# Patient Record
Sex: MALE | Race: WHITE | Employment: OTHER | URBAN - METROPOLITAN AREA
[De-identification: names, ages, dates, MRNs, and addresses within clinical notes are randomized per-mention and may not be internally consistent; named-entity substitution may affect disease eponyms.]

---

## 2017-05-30 ENCOUNTER — TRANSFERRED RECORDS (OUTPATIENT)
Dept: HEALTH INFORMATION MANAGEMENT | Facility: CLINIC | Age: 52
End: 2017-05-30

## 2017-06-07 ENCOUNTER — TRANSFERRED RECORDS (OUTPATIENT)
Dept: HEALTH INFORMATION MANAGEMENT | Facility: CLINIC | Age: 52
End: 2017-06-07

## 2017-06-13 ENCOUNTER — TRANSFERRED RECORDS (OUTPATIENT)
Dept: HEALTH INFORMATION MANAGEMENT | Facility: CLINIC | Age: 52
End: 2017-06-13

## 2017-06-17 ENCOUNTER — TRANSFERRED RECORDS (OUTPATIENT)
Dept: HEALTH INFORMATION MANAGEMENT | Facility: CLINIC | Age: 52
End: 2017-06-17

## 2017-06-21 ENCOUNTER — TRANSFERRED RECORDS (OUTPATIENT)
Dept: HEALTH INFORMATION MANAGEMENT | Facility: CLINIC | Age: 52
End: 2017-06-21

## 2017-07-04 ENCOUNTER — TRANSFERRED RECORDS (OUTPATIENT)
Dept: HEALTH INFORMATION MANAGEMENT | Facility: CLINIC | Age: 52
End: 2017-07-04

## 2017-07-10 ENCOUNTER — TRANSFERRED RECORDS (OUTPATIENT)
Dept: HEALTH INFORMATION MANAGEMENT | Facility: CLINIC | Age: 52
End: 2017-07-10

## 2017-07-24 ENCOUNTER — TRANSFERRED RECORDS (OUTPATIENT)
Dept: HEALTH INFORMATION MANAGEMENT | Facility: CLINIC | Age: 52
End: 2017-07-24

## 2017-07-26 ENCOUNTER — MEDICAL CORRESPONDENCE (OUTPATIENT)
Dept: HEALTH INFORMATION MANAGEMENT | Facility: CLINIC | Age: 52
End: 2017-07-26

## 2017-07-31 ENCOUNTER — OFFICE VISIT (OUTPATIENT)
Dept: SURGERY | Facility: CLINIC | Age: 52
End: 2017-07-31

## 2017-07-31 VITALS
BODY MASS INDEX: 24.72 KG/M2 | HEART RATE: 100 BPM | OXYGEN SATURATION: 100 % | TEMPERATURE: 98.2 F | HEIGHT: 69 IN | DIASTOLIC BLOOD PRESSURE: 86 MMHG | WEIGHT: 166.9 LBS | SYSTOLIC BLOOD PRESSURE: 125 MMHG

## 2017-07-31 DIAGNOSIS — C78.6 PERITONEAL CARCINOMATOSIS (H): ICD-10-CM

## 2017-07-31 DIAGNOSIS — C78.6 PERITONEAL CARCINOMATOSIS (H): Primary | ICD-10-CM

## 2017-07-31 LAB
ABO + RH BLD: NORMAL
ABO + RH BLD: NORMAL
ALBUMIN SERPL-MCNC: 3.2 G/DL (ref 3.4–5)
ALP SERPL-CCNC: 179 U/L (ref 40–150)
ALT SERPL W P-5'-P-CCNC: 43 U/L (ref 0–70)
ANION GAP SERPL CALCULATED.3IONS-SCNC: 8 MMOL/L (ref 3–14)
AST SERPL W P-5'-P-CCNC: 21 U/L (ref 0–45)
BILIRUB SERPL-MCNC: 0.2 MG/DL (ref 0.2–1.3)
BLD GP AB SCN SERPL QL: NORMAL
BLOOD BANK CMNT PATIENT-IMP: NORMAL
BUN SERPL-MCNC: 20 MG/DL (ref 7–30)
CALCIUM SERPL-MCNC: 9.3 MG/DL (ref 8.5–10.1)
CANCER AG125 SERPL-ACNC: 50 U/ML (ref 0–30)
CEA SERPL-MCNC: 2.1 UG/L (ref 0–2.5)
CHLORIDE SERPL-SCNC: 99 MMOL/L (ref 94–109)
CO2 SERPL-SCNC: 30 MMOL/L (ref 20–32)
CREAT SERPL-MCNC: 1.1 MG/DL (ref 0.66–1.25)
ERYTHROCYTE [DISTWIDTH] IN BLOOD BY AUTOMATED COUNT: 15 % (ref 10–15)
GFR SERPL CREATININE-BSD FRML MDRD: 70 ML/MIN/1.7M2
GLUCOSE SERPL-MCNC: 119 MG/DL (ref 70–99)
HCT VFR BLD AUTO: 31.9 % (ref 40–53)
HGB BLD-MCNC: 9.7 G/DL (ref 13.3–17.7)
MCH RBC QN AUTO: 24.2 PG (ref 26.5–33)
MCHC RBC AUTO-ENTMCNC: 30.4 G/DL (ref 31.5–36.5)
MCV RBC AUTO: 80 FL (ref 78–100)
PLATELET # BLD AUTO: 408 10E9/L (ref 150–450)
POTASSIUM SERPL-SCNC: 2.9 MMOL/L (ref 3.4–5.3)
PREALB SERPL IA-MCNC: 24 MG/DL (ref 15–45)
PROT SERPL-MCNC: 7.9 G/DL (ref 6.8–8.8)
RBC # BLD AUTO: 4.01 10E12/L (ref 4.4–5.9)
SODIUM SERPL-SCNC: 137 MMOL/L (ref 133–144)
SPECIMEN EXP DATE BLD: NORMAL
WBC # BLD AUTO: 10.7 10E9/L (ref 4–11)

## 2017-07-31 RX ORDER — CHOLESTYRAMINE 4 G/9G
2 POWDER, FOR SUSPENSION ORAL
COMMUNITY
End: 2017-11-20

## 2017-07-31 RX ORDER — LOPERAMIDE HCL 2 MG
4 CAPSULE ORAL 4 TIMES DAILY PRN
COMMUNITY
End: 2018-01-01

## 2017-07-31 ASSESSMENT — PAIN SCALES - GENERAL: PAINLEVEL: NO PAIN (0)

## 2017-07-31 NOTE — PROGRESS NOTES
Colon and Rectal Surgery Clinic Note    RE: Sebas Lopez.  : 1965.  NADIYA: 2017.    Reason for visit: Sigmoid colon adenocarcinoma with peritoneal carcinomatosis.    HPI: 53 y/o M with a h/o UC diagnosed in the  (no medical management and poor endoscopic surveillance but no previous h/o colon dysplasia) who presented in 2017 with a 3-week h/o generalized abdominal pain. CT at that time showed sigmoid wall thickening with adjacent mesenteric adenopathy and free fluid near the abdominal wall mesh. Incomplete colonoscopy showed an obstructing sigmoid colon mass. Biopsies of this lesion showed adenocarcinoma. Subsequent CT colonography showed the known malignant sigmoid mass and an 8-mm transverse colon polyp. Pt subsequently developed obstructive symptoms and underwent exploratory laparotomy by Dr. Bartholomew on 17. Diffuse peritoneal carcinomatosis was found and biopsies confirmed mucinous adenocarcinoma. Multiple large peritoneal metastases were involving the abdominal wall mesh and loops of small bowel. Given that the obstruction was at the small bowel, 2 feet of ileum was resected and an end ileostomy was fashioned. No PCI was calculated. His postoperative course was largely unremarkable. Currently he is having minimal pain and is off narcotic pain meds. Tolerating diet well. Denies issues with pouching his ileostomy but does have high output and is on Imodium and Questran for this. PMH significant for UC, HTN, and abdominal incisional hernia repair with mesh. No tobacco or EtOH abuse. Denies FH of CRC or IBD.     Medical history:  No past medical history on file.    Surgical history:  No past surgical history on file.    Family history:  No family history on file.    Medications:  Current Outpatient Prescriptions   Medication Sig Dispense Refill     cholestyramine (QUESTRAN) 4 G Packet Take 2 packets by mouth 3 times daily (with meals)       loperamide (IMODIUM) 2 MG capsule Take 2  "mg by mouth 4 times daily as needed for diarrhea       Ferrous Sulfate (IRON SUPPLEMENT PO)          Allergies:  No Known Allergies    Social history:   Social History   Substance Use Topics     Smoking status: Never Smoker     Smokeless tobacco: Never Used     Alcohol use 3.0 oz/week     5 Cans of beer per week     Marital status: single.    Physical Examination:  /86 (BP Location: Left arm, Patient Position: Chair, Cuff Size: Adult Regular)  Pulse 100  Temp 98.2  F (36.8  C) (Oral)  Ht 5' 9\"  Wt 166 lb 14.4 oz  SpO2 100%  BMI 24.65 kg/m2  General: Well hydrated. No acute distress.  HEENT: Normocephalic, EOM's intact. Non icteric conjunctiva. EAC's with no abnormalities. Oral mucosa well hydrated, with no exudates visualized. No cervical adenopathy palpated.  Chest: RRR. S1 and S2 normal. No murmurs. Good breath sounds bilaterally with no rhonchi or wheezing.   Abdomen: Soft, NT. No masses or nodules palpated. Midline incision healing well with no evidence of infection. Retention sutures in place. Right-sided ileostomy viable and functioning. No inguinal adenopathy palpated.  Extremities: Normotrophic. Distal pulses intact. ROM intact.    Investigations:  None.    Procedures:  None.    ASSESSMENT  51 y/o M with MARILEE and new diagnosis of biopsy-proven sigmoid adenocarcinoma and peritoneal carcinomatosis. We discussed the role and impact of cytoreductive surgery with hyperthermic intraperitoneal chemotherapy for metastatic colorectal cancer, including the need for short-term preoperative chemotherapy, intraoperative findings that may limit or preclude the extent of resection and delivery of intraperitoneal chemotherapy, postoperative complications, need for postoperative chemotherapy, likelihood of recurrent cancer, and quality of life. He understands these well and agrees to proceed.    No evidence of extra-abdominal disease or liver metastases but the intraoperative finding of diffuse small bowel " movement is typically preclusive for HIPEC. Risks, benefits, and alternatives of operative treatment were thoroughly discussed with the patient, he/she understands these well and agrees to proceed.    PLAN  1. CT c/a/p and Labs today.   2. CRMP referral for genetic testing.  3. Refer to Medical Oncology to start chemotherapy. Would recommend 4-6 cycles with interval re-imaging, and then return to clinic with me to discuss diagnostic laparoscopy and possible cytoreductive surgery with hyperthermic intraperitoneal chemotherapy. Will likely need full colonoscopy with random biopsies at that time if we are to proceed with surgery.    Time spent: 60 minutes.  >50% spent in discussing, counseling and coordinating care.    Rob Dudley M.D., M.Sc.     Division of Colon and Rectal Surgery  [facility]    Referring Provider:  Donovan Bartholomew MD  [address] [address]     Primary Care Provider:  Waylon Han    CC:  Bhargav Lynch MD.

## 2017-07-31 NOTE — MR AVS SNAPSHOT
After Visit Summary   7/31/2017    Sebas Lopez    MRN: [id number]           Patient Information     Date Of Birth          1965        Visit Information        Provider Department      7/31/2017 3:00 PM Rob Dudley MD [facility] Colon and Rectal Surgery        Today's Diagnoses     Peritoneal carcinomatosis (H)    -  1       Follow-ups after your visit        Additional Services     CANCER RISK MGMT/CANCER GENETIC COUNSELING REFERRAL       Your provider has referred you to the Cancer Risk Management Program - Cancer Genetic Counseling.    Reason for Referral: metastatic colon cancer (young age).    We have a sent a notice to a staff member of the Cancer Risk Management Program to give you a call to assist with scheduling your appointment.  You may also call  [phone] ([phone]) to initiate scheduling.    Please be aware that coverage of these services is subject to the terms and limitations of your health insurance plan.  Call member services at your health plan with any benefit or coverage questions.      Please bring the completed family history sheet to your appointment in addition to any available outside medical records documenting your cancer diagnosis.                  Your next 10 appointments already scheduled     Jul 31, 2017  5:00 PM CDT   (Arrive by 4:45 PM)   CT CHEST ABDOMEN PELVIS W/O & W CONTRAST with UCCT2   [facility] ([facility])    [address] [address] [phone]           Please bring any scans or X-rays taken at other hospitals, if similar tests were done. Also bring a list of your medicines, including vitamins, minerals and over-the-counter drugs. It is safest to leave personal items at home.  Be sure to tell your doctor:   If you have any allergies.   If there s any chance you are pregnant.   If you are breastfeeding.   If you have any special needs.  You  may have contrast for this exam. To prepare:   Do not eat or drink for 2 hours before your exam. If you need to take medicine, you may take it with small sips of water. (We may ask you to take liquid medicine as well.)   The day before your exam, drink extra fluids at least six 8-ounce glasses (unless your doctor tells you to restrict your fluids).  Patients over 70 or patients with diabetes or kidney problems:   If you haven t had a blood test (creatinine test) within the last 30 days, go to your clinic or Diagnostic Imaging Department for this test.  If you have diabetes:   If your kidney function is normal, continue taking your metformin (Avandamet, Glucophage, Glucovance, Metaglip) on the day of your exam.   If your kidney function is abnormal, wait 48 hours before restarting this medicine.  You will have oral contrast for this exam:   You will drink the contrast at home. Get this from your clinic or Diagnostic Imaging Department. Please follow the directions given.  Please wear loose clothing, such as a sweat suit or jogging clothes. Avoid snaps, zippers and other metal. We may ask you to undress and put on a hospital gown.  If you have any questions, please call the Imaging Department where you will have your exam.              Future tests that were ordered for you today     Open Future Orders        Priority Expected Expires Ordered    ABO/Rh type and screen Routine  7/31/2018 7/31/2017    Prealbumin Routine  10/29/2017 7/31/2017    CT Chest abdomen pelvis w & w/o contrast Routine  7/31/2018 7/31/2017    CBC with platelets Routine  10/29/2017 7/31/2017    Comprehensive metabolic panel Routine  10/29/2017 7/31/2017    CEA Routine  10/29/2017 7/31/2017     Routine  8/1/2018 7/31/2017    Cancer antigen 19-9 Routine  8/1/2018 7/31/2017            Who to contact     Please call your clinic at [phone] to:    Ask questions about your health    Make or cancel appointments    Discuss your  "medicines    Learn about your test results    Speak to your doctor   If you have compliments or concerns about an experience at your clinic, or if you wish to file a complaint, please contact [facility] Patient Relations at [phone] or email us at [email or web address]         Additional Information About Your Visit        [employer] Information     [employer] is an electronic gateway that provides easy, online access to your medical records. With hike, you can request a clinic appointment, read your test results, renew a prescription or communicate with your care team.     To sign up for hike visit the website at [email or web address]   You will be asked to enter the access code listed below, as well as some personal information. Please follow the directions to create your username and password.     Your access code is: [email or web address]  Expires: 10/25/2017  3:06 PM     Your access code will  in 90 days. If you need help or a new code, please contact your [facility] or call [phone] for assistance.        Care EveryWhere ID     This is your Care EveryWhere ID. This could be used by other organizations to access your [facility] medical records  [id number]        Your Vitals Were     Pulse Temperature Height Pulse Oximetry BMI (Body Mass Index)       100 98.2  F (36.8  C) (Oral) 5' 9\" 100% 24.65 kg/m2        Blood Pressure from Last 3 Encounters:   17 125/86    Weight from Last 3 Encounters:   17 166 lb 14.4 oz              We Performed the Following     CANCER RISK MGMT/CANCER GENETIC COUNSELING REFERRAL        Primary Care Provider Office Phone # Fax #    Waylon Han [phone]       [facility] [address] [facility] [address]        Equal Access to Services     SARAH DUNN AH: Cristina Funk, mario abernathy, bhargav carrilloaabrice " ah. So [facility] [phone].    ATENCIÓN: Si habla blanca, tiene a cid disposición servicios gratuitos de asistencia lingüística. Kenan al [phone].    We comply with applicable federal civil rights laws and [place] laws. We do not discriminate on the basis of race, color, national origin, age, disability sex, sexual orientation or gender identity.            Thank you!     Thank you for choosing [facility] COLON AND RECTAL SURGERY  for your care. Our goal is always to provide you with excellent care. Hearing back from our patients is one way we can continue to improve our services. Please take a few minutes to complete the written survey that you may receive in the mail after your visit with us. Thank you!             Your Updated Medication List - Protect others around you: Learn how to safely use, store and throw away your medicines at www.disposemymeds.org.          This list is accurate as of: 7/31/17  3:53 PM.  Always use your most recent med list.                   Brand Name Dispense Instructions for use Diagnosis    cholestyramine 4 G Packet    QUESTRAN     Take 2 packets by mouth 3 times daily (with meals)        IRON SUPPLEMENT PO           loperamide 2 MG capsule    IMODIUM     Take 2 mg by mouth 4 times daily as needed for diarrhea

## 2017-07-31 NOTE — LETTER
2017       RE: Sebas Lopez  [address] [place] [address]     Dear Colleague,    Thank you for referring your patient, Sebas Lopez, to the [facility] COLON AND RECTAL SURGERY at [facility]. Please see a copy of my visit note below.    Colon and Rectal Surgery Clinic Note    RE: Sebas Lopez.  : 1965.  NADIYA: 2017.    Reason for visit: Sigmoid colon adenocarcinoma with peritoneal carcinomatosis.    HPI: 53 y/o M with a h/o UC diagnosed in the  (no medical management and poor endoscopic surveillance but no previous h/o colon dysplasia) who presented in 2017 with a 3-week h/o generalized abdominal pain. CT at that time showed sigmoid wall thickening with adjacent mesenteric adenopathy and free fluid near the abdominal wall mesh. Incomplete colonoscopy showed an obstructing sigmoid colon mass. Biopsies of this lesion showed adenocarcinoma. Subsequent CT colonography showed the known malignant sigmoid mass and an 8-mm transverse colon polyp. Pt subsequently developed obstructive symptoms and underwent exploratory laparotomy by Dr. Bartholomew on 17. Diffuse peritoneal carcinomatosis was found and biopsies confirmed mucinous adenocarcinoma. Multiple large peritoneal metastases were involving the abdominal wall mesh and loops of small bowel. Given that the obstruction was at the small bowel, 2 feet of ileum was resected and an end ileostomy was fashioned. No PCI was calculated. His postoperative course was largely unremarkable. Currently he is having minimal pain and is off narcotic pain meds. Tolerating diet well. Denies issues with pouching his ileostomy but does have high output and is on Imodium and Questran for this. PMH significant for UC, HTN, and abdominal incisional hernia repair with mesh. No tobacco or EtOH abuse. Denies FH of CRC or IBD.     Medical history:  No past medical history on file.    Surgical history:  No past  "surgical history on file.    Family history:  No family history on file.    Medications:  Current Outpatient Prescriptions   Medication Sig Dispense Refill     cholestyramine (QUESTRAN) 4 G Packet Take 2 packets by mouth 3 times daily (with meals)       loperamide (IMODIUM) 2 MG capsule Take 2 mg by mouth 4 times daily as needed for diarrhea       Ferrous Sulfate (IRON SUPPLEMENT PO)          Allergies:  No Known Allergies    Social history:   Social History   Substance Use Topics     Smoking status: Never Smoker     Smokeless tobacco: Never Used     Alcohol use 3.0 oz/week     5 Cans of beer per week     Marital status: single.    Physical Examination:  /86 (BP Location: Left arm, Patient Position: Chair, Cuff Size: Adult Regular)  Pulse 100  Temp 98.2  F (36.8  C) (Oral)  Ht 5' 9\"  Wt 166 lb 14.4 oz  SpO2 100%  BMI 24.65 kg/m2  General: Well hydrated. No acute distress.  HEENT: Normocephalic, EOM's intact. Non icteric conjunctiva. EAC's with no abnormalities. Oral mucosa well hydrated, with no exudates visualized. No cervical adenopathy palpated.  Chest: RRR. S1 and S2 normal. No murmurs. Good breath sounds bilaterally with no rhonchi or wheezing.   Abdomen: Soft, NT. No masses or nodules palpated. Midline incision healing well with no evidence of infection. Retention sutures in place. Right-sided ileostomy viable and functioning. No inguinal adenopathy palpated.  Extremities: Normotrophic. Distal pulses intact. ROM intact.    Investigations:  None.    Procedures:  None.    ASSESSMENT  53 y/o M with MARILEE and new diagnosis of biopsy-proven sigmoid adenocarcinoma and peritoneal carcinomatosis. We discussed the role and impact of cytoreductive surgery with hyperthermic intraperitoneal chemotherapy for metastatic colorectal cancer, including the need for short-term preoperative chemotherapy, intraoperative findings that may limit or preclude the extent of resection and delivery of intraperitoneal " chemotherapy, postoperative complications, need for postoperative chemotherapy, likelihood of recurrent cancer, and quality of life. He understands these well and agrees to proceed.    No evidence of extra-abdominal disease or liver metastases but the intraoperative finding of diffuse small bowel movement is typically preclusive for HIPEC. Risks, benefits, and alternatives of operative treatment were thoroughly discussed with the patient, he/she understands these well and agrees to proceed.    PLAN  1. CT c/a/p and Labs today.   2. CRMP referral for genetic testing.  3. Refer to Medical Oncology to start chemotherapy. Would recommend 4-6 cycles with interval re-imaging, and then return to clinic with me to discuss diagnostic laparoscopy and possible cytoreductive surgery with hyperthermic intraperitoneal chemotherapy. Will likely need full colonoscopy with random biopsies at that time if we are to proceed with surgery.    Time spent: 60 minutes.  >50% spent in discussing, counseling and coordinating care.    Referring Provider:  Donovan Bartholomew MD  [address] [address]     Primary Care Provider:  Waylon Han    CC:  Bhargav Lynch MD.    Again, thank you for allowing me to participate in the care of your patient.    Rob Dudley MD

## 2017-07-31 NOTE — NURSING NOTE
"Chief Complaint   Patient presents with     Clinic Care Coordination - Initial     New pt consult for sigmoid colon cancer.        Vitals:    07/31/17 1440   BP: 125/86   BP Location: Left arm   Patient Position: Chair   Cuff Size: Adult Regular   Pulse: 100   Temp: 98.2  F (36.8  C)   TempSrc: Oral   SpO2: 100%   Weight: 166 lb 14.4 oz   Height: 5' 9\"       Body mass index is 24.65 kg/(m^2).    Jef PINO LPN                       "

## 2017-08-02 LAB — CANCER AG19-9 SERPL-ACNC: 2

## 2017-08-03 ENCOUNTER — ONCOLOGY VISIT (OUTPATIENT)
Dept: ONCOLOGY | Facility: CLINIC | Age: 52
End: 2017-08-03
Attending: INTERNAL MEDICINE
Payer: COMMERCIAL

## 2017-08-03 VITALS
BODY MASS INDEX: 25.7 KG/M2 | WEIGHT: 173.5 LBS | OXYGEN SATURATION: 100 % | HEART RATE: 98 BPM | HEIGHT: 69 IN | SYSTOLIC BLOOD PRESSURE: 134 MMHG | DIASTOLIC BLOOD PRESSURE: 89 MMHG | TEMPERATURE: 98.2 F

## 2017-08-03 DIAGNOSIS — C78.6 PERITONEAL CARCINOMATOSIS (H): ICD-10-CM

## 2017-08-03 DIAGNOSIS — C18.7 CANCER OF SIGMOID COLON (H): Primary | ICD-10-CM

## 2017-08-03 DIAGNOSIS — D64.9 ANEMIA, UNSPECIFIED TYPE: ICD-10-CM

## 2017-08-03 DIAGNOSIS — L03.311 CELLULITIS OF ABDOMINAL WALL: ICD-10-CM

## 2017-08-03 LAB
BASOPHILS # BLD AUTO: 0.1 10E9/L (ref 0–0.2)
BASOPHILS NFR BLD AUTO: 0.6 %
DIFFERENTIAL METHOD BLD: ABNORMAL
EOSINOPHIL # BLD AUTO: 1.5 10E9/L (ref 0–0.7)
EOSINOPHIL NFR BLD AUTO: 11.1 %
ERYTHROCYTE [DISTWIDTH] IN BLOOD BY AUTOMATED COUNT: 15.1 % (ref 10–15)
FERRITIN SERPL-MCNC: 36 NG/ML (ref 26–388)
FOLATE SERPL-MCNC: 21.5 NG/ML
HCT VFR BLD AUTO: 31 % (ref 40–53)
HGB BLD-MCNC: 9.5 G/DL (ref 13.3–17.7)
IMM GRANULOCYTES # BLD: 0 10E9/L (ref 0–0.4)
IMM GRANULOCYTES NFR BLD: 0.3 %
IRON SATN MFR SERPL: 5 % (ref 15–46)
IRON SERPL-MCNC: 16 UG/DL (ref 35–180)
LYMPHOCYTES # BLD AUTO: 2.5 10E9/L (ref 0.8–5.3)
LYMPHOCYTES NFR BLD AUTO: 19.1 %
MCH RBC QN AUTO: 24.4 PG (ref 26.5–33)
MCHC RBC AUTO-ENTMCNC: 30.6 G/DL (ref 31.5–36.5)
MCV RBC AUTO: 80 FL (ref 78–100)
MONOCYTES # BLD AUTO: 0.9 10E9/L (ref 0–1.3)
MONOCYTES NFR BLD AUTO: 7 %
NEUTROPHILS # BLD AUTO: 8.2 10E9/L (ref 1.6–8.3)
NEUTROPHILS NFR BLD AUTO: 61.9 %
NRBC # BLD AUTO: 0 10*3/UL
NRBC BLD AUTO-RTO: 0 /100
PLATELET # BLD AUTO: 490 10E9/L (ref 150–450)
RBC # BLD AUTO: 3.89 10E12/L (ref 4.4–5.9)
RETICS # AUTO: 25.7 10E9/L (ref 25–95)
RETICS/RBC NFR AUTO: 0.7 % (ref 0.5–2)
TIBC SERPL-MCNC: 329 UG/DL (ref 240–430)
VIT B12 SERPL-MCNC: 606 PG/ML (ref 193–986)
WBC # BLD AUTO: 13.2 10E9/L (ref 4–11)

## 2017-08-03 PROCEDURE — 83540 ASSAY OF IRON: CPT | Performed by: INTERNAL MEDICINE

## 2017-08-03 PROCEDURE — 99213 OFFICE O/P EST LOW 20 MIN: CPT

## 2017-08-03 PROCEDURE — 85025 COMPLETE CBC W/AUTO DIFF WBC: CPT | Performed by: INTERNAL MEDICINE

## 2017-08-03 PROCEDURE — 85045 AUTOMATED RETICULOCYTE COUNT: CPT | Performed by: INTERNAL MEDICINE

## 2017-08-03 PROCEDURE — 36415 COLL VENOUS BLD VENIPUNCTURE: CPT

## 2017-08-03 PROCEDURE — 83550 IRON BINDING TEST: CPT | Performed by: INTERNAL MEDICINE

## 2017-08-03 PROCEDURE — 99205 OFFICE O/P NEW HI 60 MIN: CPT | Mod: ZP | Performed by: INTERNAL MEDICINE

## 2017-08-03 PROCEDURE — 82746 ASSAY OF FOLIC ACID SERUM: CPT | Performed by: INTERNAL MEDICINE

## 2017-08-03 PROCEDURE — 99212 OFFICE O/P EST SF 10 MIN: CPT | Mod: ZF

## 2017-08-03 PROCEDURE — 40000611 ZZHCL STATISTIC MORPHOLOGY W/INTERP HEMEPATH TC 85060: Performed by: INTERNAL MEDICINE

## 2017-08-03 PROCEDURE — 82607 VITAMIN B-12: CPT | Performed by: INTERNAL MEDICINE

## 2017-08-03 PROCEDURE — 82728 ASSAY OF FERRITIN: CPT | Performed by: INTERNAL MEDICINE

## 2017-08-03 ASSESSMENT — PAIN SCALES - GENERAL: PAINLEVEL: NO PAIN (0)

## 2017-08-03 NOTE — PATIENT INSTRUCTIONS
Start Augmentin for wound cellulitis    Talk to surgeon regarding pulling the suture    Schedule port placement    Start FOLFOX asap    Labs today    See a provider on the day you start chemo

## 2017-08-03 NOTE — PROGRESS NOTES
Oncology initial visit:  Date on this visit: 8/3/2017    Sebas Lopez  is referred by Dr.Wolfgang Dharmesh Dudley for an oncology consultation. He requires evaluation for new diagnosis of sigmoid adenocarcinoma with peritoneal carcinomatosis    Primary Physician: Waylon Han     History Of Present Illness:     Sebas is a 52-year-old male who has a history of ulcerative colitis diagnosed more than 20 years ago, but he has not had medical management for that.  He was doing well, but in 05/2017 he presented with a few weeks of abdominal pain.  At that time, his imaging showed that he had a sigmoid wall thickening with adjacent mesenteric lymphadenopathy and free fluid near the abdominal wall mesh from his prior hernia surgery.  He subsequently underwent a colonoscopy which was incomplete and showed an obstructing sigmoid colon mass.  The biopsy from the sigmoid mass showed sigmoid adenocarcinoma.  He then developed obstructive symptoms and underwent exploratory laparotomy on 07/10/2017.  During that he was found to have diffuse peritoneal carcinomatosis.  Extensive lysis of adhesions was performed and a small bowel resection was performed with end ileostomy.  The biopsies from the multiple large peritoneal metastasis also were positive for metastatic adenocarcinoma.      He told me that after that he lost about 20 pounds but has recovered well from the surgery.  He currently denies any nausea or vomiting.  He thinks his ostomy output is significant, but he takes Imodium as well as cholestyramine to control it.  He is able to eat and drink well and maintain his hydration.  He denies any blood.  He denies any pain, but over the last couple of days one of the sutures started to bother him and he noticed a little bit of pus coming out from it.  He tells me that he was supposed to get the sutures taken out next week, but he wants to get them out sooner than that.  Otherwise, he denies any infections or new  "swellings or any other pain.  He denies any neuropathy.  He denies any neurological problems.  He has no issues with hearing.     ROS:  A comprehensive ROS was otherwise neg      Past Medical/Surgical History:  No past medical history on file.  No past surgical history on file.   Ulcerative colitis.  Left hip replacement.       Cancer History:   As above    Allergies:  Allergies as of 08/03/2017     (No Known Allergies)     Current Medications:  Current Outpatient Prescriptions   Medication Sig Dispense Refill     amoxicillin-clavulanate (AUGMENTIN) 875-125 MG per tablet Take 1 tablet by mouth 2 times daily for 7 days 14 tablet 0     cholestyramine (QUESTRAN) 4 G Packet Take 2 packets by mouth 3 times daily (with meals)       loperamide (IMODIUM) 2 MG capsule Take 2 mg by mouth 4 times daily as needed for diarrhea       Ferrous Sulfate (IRON SUPPLEMENT PO)         Family History:  No family history on file.   No family history of cancer.  He does not have any kids.       Social History:  Social History     Social History     Marital status: Single     Spouse name: N/A     Number of children: N/A     Years of education: N/A     Occupational History     Not on file.     Social History Main Topics     Smoking status: Never Smoker     Smokeless tobacco: Never Used     Alcohol use 3.0 oz/week     5 Cans of beer per week     Drug use: No     Sexual activity: Not on file     Other Topics Concern     Not on file     Social History Narrative   He does not smoke and does not drink.  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  He lives with his girlfriend, Bessie.       Physical Exam:  /89 (BP Location: Right arm, Patient Position: Sitting, Cuff Size: Adult Regular)  Pulse 98  Temp 98.2  F (36.8  C) (Oral)  Ht 1.753 m (5' 9\")  Wt 78.7 kg (173 lb 8 oz)  SpO2 100%  BMI 25.62 kg/m2  CONSTITUTIONAL: no acute distress  EYES: PERRLA, no palor or icterus.   ENT/MOUTH: no mouth lesions. Oropharynx normal  CVS: " s1s2 no m r g .   RESPIRATORY: clear to auscultation b/l  GI: surgical scars are healing well. Ostomy site is clean. There is 1 suture on the right side of the ostomy which is slightly tender and red and with squeezing, a little bit of yellowish pussy material is coming out.  I believe it is infected.        NEURO: AAOX3  Grossly non focal neuro exam  INTEGUMENT: no obvious rashes  LYMPHATIC: no palpable cervical, supraclavicular, axillary or inguinal LAD  MUSCULOSKELETAL: Unremarkable. No bony tenderness.   EXTREMITIES: no edema  PSYCH: Mentation, mood and affect are normal. Decision making capacity is intact      Laboratory/Imaging Studies  Results for orders placed or performed in visit on 07/31/17   CT Chest/Abdomen/Pelvis w Contrast    Narrative    EXAMINATION: CT CHEST/ABDOMEN/PELVIS W CONTRAST, 7/31/2017 5:33 PM    TECHNIQUE:  Helical CT images from the thoracic inlet through the  symphysis pubis were obtained  with contrast. Contrast dose: Isovue  370 93cc    COMPARISON: CT abdomen and pelvis 7/2/2017 and CT colonography  6/21/2017.    HISTORY: Secondary malignant neoplasm of retroperitoneum and  peritoneum, Malignant (primary) neoplasm, unspecified    FINDINGS:    Chest: Heart size within normal limits. No pericardial effusion.  Conventional three-vessel branching pattern of the aortic arch.  Ascending aorta and pulmonary artery are normal caliber. Subcentimeter  lower paratracheal lymph nodes and subcarinal lymph node, which are  not enlarged by CT size criteria. Bilateral 1.5 cm axillary lymph  nodes with preserved fatty alek appear benign. Thyroid is homogeneous.    Central airways are patent. No pulmonary nodules or masses. Platelike  opacity in the left lung base, likely atelectasis or scar. No pleural  effusion or pneumothorax.    Abdomen and pelvis: The liver is unremarkable. The gallbladder is  nondistended, without wall thickening or pericholecystic fluid. No  evidence of calcified gallstones. The  spleen, pancreas, and adrenal  glands are unremarkable. There is an exophytic lesion from the  superior pole of the right kidney, which measures 1.1 cm and has a  hyperattenuating and hypoattenuating component. There is mild  pelviectasis of the right kidney. Mild pelviectasis of the left  kidney. No evidence of hydroureter. The bladder is partially  visualized and unremarkable. No dilated loops of bowel. Postsurgical  changes of loop ileostomy and decompressed distal colon. There is  again visualized a mass in the sigmoid colon which is hypointense  since. Some surrounding adenopathy in the adjacent sigmoid mesial  colon measures up to 1 cm in short axis.    The abdominal aorta is normal caliber and the origins of the celiac  trunk, SMA, and BHAVYA are patent at their origins. Multiple  subcentimeter inguinal lymph nodes, which are not enlarged by CT size  criteria. No retroperitoneal enlarged lymph nodes by CT size criteria.  There is nodularity of the anterior abdominal wall internal to the  rectus abdominis. Multiple mesenteric round soft tissue nodules, for  example a 0.6 cm nodule in the right lower quadrant mesentery (series  2 image 81), a 0.7 cm soft tissue nodule medial to the right kidney  within the mesentery (series 2 image 75), a 1.6 cm x 0.6 cm ovoid  nodule in the anterior midline mesentery (series 2 image 73), and a  0.7 cm round soft tissue nodule in the left upper quadrant mesentery  (series 2 image 72). Several lesions nodules about to the anterior  abdominal wall hernia repair mesh.     Bones and soft tissues: Mild degenerative changes of the lumbar  spine. Postsurgical changes of left total hip arthroplasty. No  suspicious bony lesions. Lucent lesion in the left acetabulum, is  likely a geode.      Impression    IMPRESSION:   1. Revisualization of sigmoid colon mass with adjacent mesenteric  adenopathy.  2. Multiple mesenteric and abdominal wall nodules, consistent with the  patient's known  peritoneal carcinomatosis. This is not changed  substantially since 7/2/2017  3. Right kidney superior pole exophytic mixed attenuation lesion,  which is nonspecific but may represent a renal cyst with hemorrhage or  proteinaceous material. Recommend attention on follow-up. Ultrasound  could also be considered.  4. No evidence of metastatic disease within the liver or chest.   5. Postsurgical changes of loop ileostomy. Decompressed distal colon,  and no evidence of obstruction.    I have personally reviewed the examination and initial interpretation  and I agree with the findings.    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  The sigmoid carcinoma is obstructing, requiring exploratory laparotomy and end ileostomy along with small bowel resection.  We discussed this whole situation in detail and I recommend that we start him on palliative chemotherapy.  We discussed the need for port placement.  We also discussed the rationale, schedule and potential side effects of FOLFOX.  He gave me verbal consent after understanding what the chemotherapy is.  He was also given a handout on the chemotherapy.        We would also like his pathology specimen to be reviewed by our pathologist and I would like to check the MSI status as well as NGS panel on the pathology specimen.       If we get good results with chemotherapy, then potentially he can go to debulking surgery with HIPEC as mentioned in Dr. Dudley's note.      Currently he has started to develop an infection in one of the suture sites on the right side of his abdomen and I will give him a course of Augmentin.  He will talk to his surgeon to get the sutures removed as soon as possible.        He does have anemia and I would like to do an anemia workup.  We will check a peripheral blood smear, iron studies, vitamin B12 and folate.  If need be, we will give him IV iron.  I  discussed with him what IV iron dextran is.  We also discussed the rationale and the potential side effects of it.  He gave me consent to give him IV iron dextran if need be.  We will decide about that once I have seen the anemia workup which I am ordering for him.     We also discussed the importance of maintaining good and healthy nutrition.  We also discussed the importance of keeping active and exercising regularly.        All of his questions were answered to his satisfaction and he is agreeable and comfortable with this plan.            Albert Long       Addendum  He has evidence of iron deficiency anemia. His peripheral blood smear is pending. I will schedule him to receive iron dextran hopefully sometime early next week    Albert Long

## 2017-08-03 NOTE — MR AVS SNAPSHOT
After Visit Summary   8/3/2017    Sebas Lopez    MRN: [id number]           Patient Information     Date Of Birth          1965        Visit Information        Provider Department      8/3/2017 3:45 PM Albert oLng MD [facility]        Today's Diagnoses     Cancer of sigmoid colon (H)    -  1    Peritoneal carcinomatosis (H)        Anemia, unspecified type        Cellulitis of abdominal wall          Care Instructions    Start Augmentin for wound cellulitis    Talk to surgeon regarding pulling the suture    Schedule port placement    Start FOLFOX asap    Labs today    See a provider on the day you start chemo          Follow-ups after your visit        Your next 10 appointments already scheduled     Aug 11, 2017  7:15 AM CDT   [facility] with  [facility]   [facility] ([facility])    [address] [place] [id number] [phone]            Aug 11, 2017  7:50 AM CDT   (Arrive by 7:35 AM)   Return Visit with Chiquis Negro PA-C   [facility] ([facility])    [address] [place] [id number] [phone]            Aug 11, 2017  8:00 AM CDT   Infusion 240 with [facility] INFUSION, UC 15 ATC   [facility] ([facility])    [address] [place] [id number] [phone]              Future tests that were ordered for you today     Open Future Orders        Priority Expected Expires Ordered    IR Chest Port Placement > 5 Yrs of Age Routine  8/3/2018 8/3/2017            Who to contact     If you have questions or need follow up information about today's clinic visit or your schedule please contact [facility] directly at [phone].  Normal or non-critical lab and imaging results will be communicated to you by MyChart, letter or  "phone within 4 business days after the clinic has received the results. If you do not hear from us within 7 days, please contact the clinic through medineering or phone. If you have a critical or abnormal lab result, we will notify you by phone as soon as possible.  Submit refill requests through medineering or call your pharmacy and they will forward the refill request to us. Please allow 3 business days for your refill to be completed.          Additional Information About Your Visit        medineering Information     medineering lets you send messages to your doctor, view your test results, renew your prescriptions, schedule appointments and more. To sign up, go to [email or web address] . Click on \"Log in\" on the left side of the screen, which will take you to the Welcome page. Then click on \"Sign up Now\" on the right side of the page.     You will be asked to enter the access code listed below, as well as some personal information. Please follow the directions to create your username and password.     Your access code is: [email or web address]  Expires: 10/25/2017  3:06 PM     Your access code will  in 90 days. If you need help or a new code, please call your [place] clinic or [phone].        Care EveryWhere ID     This is your Care EveryWhere ID. This could be used by other organizations to access your [place] medical records  [id number]        Your Vitals Were     Pulse Temperature Height Pulse Oximetry BMI (Body Mass Index)       98 98.2  F (36.8  C) (Oral) 1.753 m (5' 9\") 100% 25.62 kg/m2        Blood Pressure from Last 3 Encounters:   17 134/89   17 125/86    Weight from Last 3 Encounters:   17 78.7 kg (173 lb 8 oz)   17 75.7 kg (166 lb 14.4 oz)                 Today's Medication Changes          These changes are accurate as of: 8/3/17  4:34 PM.  If you have any questions, ask your nurse or doctor.               Start taking these medicines.        Dose/Directions    amoxicillin-clavulanate " 875-125 MG per tablet   Commonly known as:  AUGMENTIN   Used for:  Cellulitis of abdominal wall   Started by:  Albert Long MD        Dose:  1 tablet   Take 1 tablet by mouth 2 times daily for 7 days   Quantity:  14 tablet   Refills:  0            Where to get your medicines      These medications were sent to [facility] - [address] [address]     Phone:  [phone]     amoxicillin-clavulanate 875-125 MG per tablet                Primary Care Provider Office Phone # Fax #    Waylon PICHARDO Emely [phone]       [facility] [address]  [facility] [address]        Equal Access to Services     SARAH DUNN : Hadii aad ku hadasho Soomaali, waaxda luqadaha, qaybta kaalmada adeegyada, bhargav willis. So [facility] [phone].    ATENCIÓN: Si habla español, tiene a cid disposición servicios gratuitos de asistencia lingüística. Llame al [phone].    We comply with applicable federal civil rights laws and [place] laws. We do not discriminate on the basis of race, color, national origin, age, disability sex, sexual orientation or gender identity.            Thank you!     Thank you for choosing [facility]  for your care. Our goal is always to provide you with excellent care. Hearing back from our patients is one way we can continue to improve our services. Please take a few minutes to complete the written survey that you may receive in the mail after your visit with us. Thank you!             Your Updated Medication List - Protect others around you: Learn how to safely use, store and throw away your medicines at www.disposemymeds.org.          This list is accurate as of: 8/3/17  4:34 PM.  Always use your most recent med list.                   Brand Name Dispense Instructions for use Diagnosis    amoxicillin-clavulanate 875-125 MG per tablet    AUGMENTIN    14  tablet    Take 1 tablet by mouth 2 times daily for 7 days    Cellulitis of abdominal wall       cholestyramine 4 G Packet    QUESTRAN     Take 2 packets by mouth 3 times daily (with meals)        IRON SUPPLEMENT PO           loperamide 2 MG capsule    IMODIUM     Take 2 mg by mouth 4 times daily as needed for diarrhea

## 2017-08-03 NOTE — LETTER
8/3/2017       RE: Sebas Lopez  [address] [place] [place] [address]     Dear Colleague,    Thank you for referring your patient, Sebas Lopez, to the [facility]. Please see a copy of my visit note below.    Oncology initial visit:  Date on this visit: 8/3/2017    Sebas Lopez  is referred by Dr.Wolfgang Dharmesh Dudley for an oncology consultation. He requires evaluation for new diagnosis of sigmoid adenocarcinoma with peritoneal carcinomatosis    Primary Physician: Waylon Han     History Of Present Illness:     Sebas is a 52-year-old male who has a history of ulcerative colitis diagnosed more than 20 years ago, but he has not had medical management for that.  He was doing well, but in 05/2017 he presented with a few weeks of abdominal pain.  At that time, his imaging showed that he had a sigmoid wall thickening with adjacent mesenteric lymphadenopathy and free fluid near the abdominal wall mesh from his prior hernia surgery.  He subsequently underwent a colonoscopy which was incomplete and showed an obstructing sigmoid colon mass.  The biopsy from the sigmoid mass showed sigmoid adenocarcinoma.  He then developed obstructive symptoms and underwent exploratory laparotomy on 07/10/2017.  During that he was found to have diffuse peritoneal carcinomatosis.  Extensive lysis of adhesions was performed and a small bowel resection was performed with end ileostomy.  The biopsies from the multiple large peritoneal metastasis also were positive for metastatic adenocarcinoma.      He told me that after that he lost about 20 pounds but has recovered well from the surgery.  He currently denies any nausea or vomiting.  He thinks his ostomy output is significant, but he takes Imodium as well as cholestyramine to control it.  He is able to eat and drink well and maintain his hydration.  He denies any blood.  He denies any pain, but over the last couple of days one of the sutures started  to bother him and he noticed a little bit of pus coming out from it.  He tells me that he was supposed to get the sutures taken out next week, but he wants to get them out sooner than that.  Otherwise, he denies any infections or new swellings or any other pain.  He denies any neuropathy.  He denies any neurological problems.  He has no issues with hearing.     ROS:  A comprehensive ROS was otherwise neg      Past Medical/Surgical History:  No past medical history on file.  No past surgical history on file.   Ulcerative colitis.  Left hip replacement.       Cancer History:   As above    Allergies:  Allergies as of 08/03/2017     (No Known Allergies)     Current Medications:  Current Outpatient Prescriptions   Medication Sig Dispense Refill     amoxicillin-clavulanate (AUGMENTIN) 875-125 MG per tablet Take 1 tablet by mouth 2 times daily for 7 days 14 tablet 0     cholestyramine (QUESTRAN) 4 G Packet Take 2 packets by mouth 3 times daily (with meals)       loperamide (IMODIUM) 2 MG capsule Take 2 mg by mouth 4 times daily as needed for diarrhea       Ferrous Sulfate (IRON SUPPLEMENT PO)         Family History:  No family history on file.   No family history of cancer.  He does not have any kids.       Social History:  Social History     Social History     Marital status: Single     Spouse name: N/A     Number of children: N/A     Years of education: N/A     Occupational History     Not on file.     Social History Main Topics     Smoking status: Never Smoker     Smokeless tobacco: Never Used     Alcohol use 3.0 oz/week     5 Cans of beer per week     Drug use: No     Sexual activity: Not on file     Other Topics Concern     Not on file     Social History Narrative   He does not smoke and does not drink.  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  He lives with his girlfriend, Bessie.       Physical Exam:  /89 (BP Location: Right arm, Patient Position: Sitting, Cuff Size: Adult Regular)  Pulse 98   "Temp 98.2  F (36.8  C) (Oral)  Ht 1.753 m (5' 9\")  Wt 78.7 kg (173 lb 8 oz)  SpO2 100%  BMI 25.62 kg/m2  CONSTITUTIONAL: no acute distress  EYES: PERRLA, no palor or icterus.   ENT/MOUTH: no mouth lesions. Oropharynx normal  CVS: s1s2 no m r g .   RESPIRATORY: clear to auscultation b/l  GI: surgical scars are healing well. Ostomy site is clean. There is 1 suture on the right side of the ostomy which is slightly tender and red and with squeezing, a little bit of yellowish pussy material is coming out.  I believe it is infected.        NEURO: AAOX3  Grossly non focal neuro exam  INTEGUMENT: no obvious rashes  LYMPHATIC: no palpable cervical, supraclavicular, axillary or inguinal LAD  MUSCULOSKELETAL: Unremarkable. No bony tenderness.   EXTREMITIES: no edema  PSYCH: Mentation, mood and affect are normal. Decision making capacity is intact      Laboratory/Imaging Studies  Results for orders placed or performed in visit on 07/31/17   CT Chest/Abdomen/Pelvis w Contrast    Narrative    EXAMINATION: CT CHEST/ABDOMEN/PELVIS W CONTRAST, 7/31/2017 5:33 PM    TECHNIQUE:  Helical CT images from the thoracic inlet through the  symphysis pubis were obtained  with contrast. Contrast dose: Isovue  370 93cc    COMPARISON: CT abdomen and pelvis 7/2/2017 and CT colonography  6/21/2017.    HISTORY: Secondary malignant neoplasm of retroperitoneum and  peritoneum, Malignant (primary) neoplasm, unspecified    FINDINGS:    Chest: Heart size within normal limits. No pericardial effusion.  Conventional three-vessel branching pattern of the aortic arch.  Ascending aorta and pulmonary artery are normal caliber. Subcentimeter  lower paratracheal lymph nodes and subcarinal lymph node, which are  not enlarged by CT size criteria. Bilateral 1.5 cm axillary lymph  nodes with preserved fatty alek appear benign. Thyroid is homogeneous.    Central airways are patent. No pulmonary nodules or masses. Platelike  opacity in the left lung base, likely " atelectasis or scar. No pleural  effusion or pneumothorax.    Abdomen and pelvis: The liver is unremarkable. The gallbladder is  nondistended, without wall thickening or pericholecystic fluid. No  evidence of calcified gallstones. The spleen, pancreas, and adrenal  glands are unremarkable. There is an exophytic lesion from the  superior pole of the right kidney, which measures 1.1 cm and has a  hyperattenuating and hypoattenuating component. There is mild  pelviectasis of the right kidney. Mild pelviectasis of the left  kidney. No evidence of hydroureter. The bladder is partially  visualized and unremarkable. No dilated loops of bowel. Postsurgical  changes of loop ileostomy and decompressed distal colon. There is  again visualized a mass in the sigmoid colon which is hypointense  since. Some surrounding adenopathy in the adjacent sigmoid mesial  colon measures up to 1 cm in short axis.    The abdominal aorta is normal caliber and the origins of the celiac  trunk, SMA, and BHAVYA are patent at their origins. Multiple  subcentimeter inguinal lymph nodes, which are not enlarged by CT size  criteria. No retroperitoneal enlarged lymph nodes by CT size criteria.  There is nodularity of the anterior abdominal wall internal to the  rectus abdominis. Multiple mesenteric round soft tissue nodules, for  example a 0.6 cm nodule in the right lower quadrant mesentery (series  2 image 81), a 0.7 cm soft tissue nodule medial to the right kidney  within the mesentery (series 2 image 75), a 1.6 cm x 0.6 cm ovoid  nodule in the anterior midline mesentery (series 2 image 73), and a  0.7 cm round soft tissue nodule in the left upper quadrant mesentery  (series 2 image 72). Several lesions nodules about to the anterior  abdominal wall hernia repair mesh.     Bones and soft tissues: Mild degenerative changes of the lumbar  spine. Postsurgical changes of left total hip arthroplasty. No  suspicious bony lesions. Lucent lesion in the left  acetabulum, is  likely a geode.      Impression    IMPRESSION:   1. Revisualization of sigmoid colon mass with adjacent mesenteric  adenopathy.  2. Multiple mesenteric and abdominal wall nodules, consistent with the  patient's known peritoneal carcinomatosis. This is not changed  substantially since 7/2/2017  3. Right kidney superior pole exophytic mixed attenuation lesion,  which is nonspecific but may represent a renal cyst with hemorrhage or  proteinaceous material. Recommend attention on follow-up. Ultrasound  could also be considered.  4. No evidence of metastatic disease within the liver or chest.   5. Postsurgical changes of loop ileostomy. Decompressed distal colon,  and no evidence of obstruction.    I have personally reviewed the examination and initial interpretation  and I agree with the findings.    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  The sigmoid carcinoma is obstructing, requiring exploratory laparotomy and end ileostomy along with small bowel resection.  We discussed this whole situation in detail and I recommend that we start him on palliative chemotherapy.  We discussed the need for port placement.  We also discussed the rationale, schedule and potential side effects of FOLFOX.  He gave me verbal consent after understanding what the chemotherapy is.  He was also given a handout on the chemotherapy.        We would also like his pathology specimen to be reviewed by our pathologist and I would like to check the MSI status as well as NGS panel on the pathology specimen.       If we get good results with chemotherapy, then potentially he can go to debulking surgery with HIPEC as mentioned in Dr. Dudley's note.      Currently he has started to develop an infection in one of the suture sites on the right side of his abdomen and I will give him a course of Augmentin.  He will talk to his surgeon to get  the sutures removed as soon as possible.        He does have anemia and I would like to do an anemia workup.  We will check a peripheral blood smear, iron studies, vitamin B12 and folate.  If need be, we will give him IV iron.  I discussed with him what IV iron dextran is.  We also discussed the rationale and the potential side effects of it.  He gave me consent to give him IV iron dextran if need be.  We will decide about that once I have seen the anemia workup which I am ordering for him.     We also discussed the importance of maintaining good and healthy nutrition.  We also discussed the importance of keeping active and exercising regularly.        All of his questions were answered to his satisfaction and he is agreeable and comfortable with this plan.          Addendum  He has evidence of iron deficiency anemia. His peripheral blood smear is pending. I will schedule him to receive iron dextran hopefully sometime early next week    Albert Long

## 2017-08-03 NOTE — NURSING NOTE
"Oncology Rooming Note    August 3, 2017 3:15 PM   Sebas Lopez is a 52 year old male who presents for:    Chief Complaint   Patient presents with     Oncology Clinic Visit     New Patient-Colon CA     Initial Vitals: /89 (BP Location: Right arm, Patient Position: Sitting, Cuff Size: Adult Regular)  Pulse 98  Temp 98.2  F (36.8  C) (Oral)  Ht 1.753 m (5' 9\")  Wt 78.7 kg (173 lb 8 oz)  SpO2 100%  BMI 25.62 kg/m2 Estimated body mass index is 25.62 kg/(m^2) as calculated from the following:    Height as of this encounter: 1.753 m (5' 9\").    Weight as of this encounter: 78.7 kg (173 lb 8 oz). Body surface area is 1.96 meters squared.  No Pain (0) Comment: Data Unavailable   No LMP for male patient.  Allergies reviewed: Yes  Medications reviewed: Yes    Medications: Medication refills not needed today.  Pharmacy name entered into [facility]: [facility] [place] - [address]    Clinical concerns: Infection in Sutures Dr. Long was notified.    10 minutes for nursing intake (face to face time)     Tracie Caro LPN              "

## 2017-08-04 ENCOUNTER — TELEPHONE (OUTPATIENT)
Dept: ONCOLOGY | Facility: CLINIC | Age: 52
End: 2017-08-04

## 2017-08-04 ENCOUNTER — TELEPHONE (OUTPATIENT)
Dept: SURGERY | Facility: CLINIC | Age: 52
End: 2017-08-04

## 2017-08-04 PROBLEM — D50.0 IRON DEFICIENCY ANEMIA DUE TO CHRONIC BLOOD LOSS: Status: ACTIVE | Noted: 2017-08-04

## 2017-08-04 LAB — COPATH REPORT: NORMAL

## 2017-08-04 NOTE — TELEPHONE ENCOUNTER
Received call from Tone in call center, wanting to make sure appointment was scheduled correctly for this upcoming Monday.  Tone states that [facility] called requesting an appointment with Dr. Dudley for surgical site infection.  Informed Tone that patient has not had surgery with Dr. Dudley yet (only consult), and that patient will need to meet with Dr. Man's office at [facility], who did his procedure.  Tone states she will contact Divina in [facility] to inform her.  Appointment has been cancelled.

## 2017-08-04 NOTE — TELEPHONE ENCOUNTER
Called Sebas to let him know that he needs iron and is scheduled for infusion on Monday morning, 8/7. Sebas is unable to make this appointment because he has an infected incision and needs to seen by his surgeon around 1130am on Monday morning at [facility]. Appointments cancelled and infusion team notified of need to reschedule via inbasket, per infusion team request. Called Sebas back to let him know that he should hear from the team on Monday morning re: a new appointment, but that we are unable to schedule it at this time. Advised Sebas to seek care over the weekend if he develops more fatigue or shortness of breath. Sebas takes iron supplements and will continue to do so. He verbalized understanding and is agreeable to plan.    Camilla Gillette RN

## 2017-08-07 ENCOUNTER — HOME INFUSION (PRE-WILLOW HOME INFUSION) (OUTPATIENT)
Dept: PHARMACY | Facility: CLINIC | Age: 52
End: 2017-08-07

## 2017-08-07 RX ORDER — EPINEPHRINE 0.3 MG/.3ML
0.3 INJECTION SUBCUTANEOUS EVERY 5 MIN PRN
Status: CANCELLED | OUTPATIENT
Start: 2017-08-08

## 2017-08-07 RX ORDER — ALBUTEROL SULFATE 90 UG/1
1-2 AEROSOL, METERED RESPIRATORY (INHALATION)
Status: CANCELLED
Start: 2017-08-08

## 2017-08-07 RX ORDER — METHYLPREDNISOLONE SODIUM SUCCINATE 125 MG/2ML
125 INJECTION, POWDER, LYOPHILIZED, FOR SOLUTION INTRAMUSCULAR; INTRAVENOUS
Status: CANCELLED
Start: 2017-08-08

## 2017-08-07 RX ORDER — ALBUTEROL SULFATE 0.83 MG/ML
2.5 SOLUTION RESPIRATORY (INHALATION)
Status: CANCELLED | OUTPATIENT
Start: 2017-08-08

## 2017-08-07 RX ORDER — MEPERIDINE HYDROCHLORIDE 50 MG/ML
25 INJECTION INTRAMUSCULAR; INTRAVENOUS; SUBCUTANEOUS EVERY 30 MIN PRN
Status: CANCELLED | OUTPATIENT
Start: 2017-08-08

## 2017-08-07 RX ORDER — EPINEPHRINE 1 MG/ML
0.3 INJECTION INTRAMUSCULAR; INTRAVENOUS; SUBCUTANEOUS EVERY 5 MIN PRN
Status: CANCELLED | OUTPATIENT
Start: 2017-08-08

## 2017-08-07 RX ORDER — DIPHENHYDRAMINE HYDROCHLORIDE 50 MG/ML
50 INJECTION INTRAMUSCULAR; INTRAVENOUS
Status: CANCELLED
Start: 2017-08-08

## 2017-08-07 RX ORDER — SODIUM CHLORIDE 9 MG/ML
1000 INJECTION, SOLUTION INTRAVENOUS CONTINUOUS PRN
Status: CANCELLED
Start: 2017-08-08

## 2017-08-07 RX ORDER — LORAZEPAM 2 MG/ML
0.5 INJECTION INTRAMUSCULAR EVERY 4 HOURS PRN
Status: CANCELLED
Start: 2017-08-08

## 2017-08-07 RX ORDER — FLUOROURACIL 50 MG/ML
400 INJECTION, SOLUTION INTRAVENOUS ONCE
Status: CANCELLED | OUTPATIENT
Start: 2017-08-08

## 2017-08-07 NOTE — PROGRESS NOTES
Therapy: 5FU  Insurance: [employer]  Ded: $1000  Met: $1000    Co-Insurance: 75%  Max Out of Pocket: $3250  Met: $3250    In reference to inbasket referral for 5FU scheduled 8/11/2017.   Please contact Intake with any questions, [phone] or In Basket pool, [facility] ([phone]).

## 2017-08-08 ENCOUNTER — INFUSION THERAPY VISIT (OUTPATIENT)
Dept: ONCOLOGY | Facility: CLINIC | Age: 52
End: 2017-08-08
Attending: INTERNAL MEDICINE
Payer: COMMERCIAL

## 2017-08-08 VITALS
RESPIRATION RATE: 16 BRPM | OXYGEN SATURATION: 99 % | TEMPERATURE: 98.4 F | HEART RATE: 85 BPM | SYSTOLIC BLOOD PRESSURE: 129 MMHG | DIASTOLIC BLOOD PRESSURE: 80 MMHG

## 2017-08-08 DIAGNOSIS — C78.6 PERITONEAL CARCINOMATOSIS (H): ICD-10-CM

## 2017-08-08 DIAGNOSIS — C18.7 CANCER OF SIGMOID COLON (H): ICD-10-CM

## 2017-08-08 DIAGNOSIS — D50.0 IRON DEFICIENCY ANEMIA DUE TO CHRONIC BLOOD LOSS: Primary | ICD-10-CM

## 2017-08-08 PROCEDURE — 96365 THER/PROPH/DIAG IV INF INIT: CPT

## 2017-08-08 PROCEDURE — 25000128 H RX IP 250 OP 636: Mod: ZF | Performed by: INTERNAL MEDICINE

## 2017-08-08 PROCEDURE — 96366 THER/PROPH/DIAG IV INF ADDON: CPT

## 2017-08-08 RX ORDER — ONDANSETRON 8 MG/1
8 TABLET, FILM COATED ORAL EVERY 8 HOURS PRN
Qty: 10 TABLET | Refills: 2 | Status: SHIPPED | OUTPATIENT
Start: 2017-08-08 | End: 2017-09-08

## 2017-08-08 RX ORDER — EPINEPHRINE 0.3 MG/.3ML
INJECTION SUBCUTANEOUS
Status: DISCONTINUED
Start: 2017-08-08 | End: 2017-08-08 | Stop reason: WASHOUT

## 2017-08-08 RX ORDER — PROCHLORPERAZINE MALEATE 10 MG
10 TABLET ORAL EVERY 6 HOURS PRN
Qty: 30 TABLET | Refills: 2 | Status: SHIPPED | OUTPATIENT
Start: 2017-08-08 | End: 2017-11-20

## 2017-08-08 RX ORDER — LORAZEPAM 0.5 MG/1
0.5 TABLET ORAL EVERY 4 HOURS PRN
Qty: 30 TABLET | Refills: 2 | Status: SHIPPED | OUTPATIENT
Start: 2017-08-08 | End: 2017-10-26

## 2017-08-08 RX ADMIN — SODIUM CHLORIDE 250 ML: 9 INJECTION, SOLUTION INTRAVENOUS at 08:26

## 2017-08-08 RX ADMIN — IRON DEXTRAN 25 MG: 50 INJECTION INTRAMUSCULAR; INTRAVENOUS at 08:34

## 2017-08-08 RX ADMIN — IRON DEXTRAN 975 MG: 50 INJECTION INTRAMUSCULAR; INTRAVENOUS at 10:04

## 2017-08-08 NOTE — MR AVS SNAPSHOT
After Visit Summary   8/8/2017    Sebas Lopez    MRN: [id number]           Patient Information     Date Of Birth          1965        Visit Information        Provider Department      8/8/2017 8:00 AM UC 20 ATC; [facility] ONCOLOGY INFUSION [facility]        Today's Diagnoses     Iron deficiency anemia due to chronic blood loss    -  1    Peritoneal carcinomatosis (H)        Cancer of sigmoid colon (H)          Care Instructions    Contact Numbers    [facility] [place] [phone]  [facility] [place]:  [phone]    Call triage with chills and/or temperature greater than or equal to 100.5, uncontrolled nausea/vomiting, diarrhea, constipation, dizziness, shortness of breath, chest pain, bleeding, unexplained bruising, or any new/concerning symptoms, questions/concerns.     If you are having any concerning symptoms or wish to speak to a provider before your next infusion visit, please call your care coordinator or triage to notify them so we can adequately serve you.       After Hours: [phone]    If after hours, weekends, or holidays, call main hospital  and ask for Oncology doctor on call.         August 2017 Sunday Monday Tuesday Wednesday Thursday Friday Saturday             1     2     3     [facility] NEW    3:30 PM   (60 min.)   Albert Long MD   [facility] 4     5       6     7     8     [facility] ONC INFUSION 360    8:00 AM   (360 min.)   UC ONCOLOGY INFUSION   [facility] 9     10     IR CHEST PORT PLACEMENT >5 YRS   11:30 AM   (60 min.)   [id number]   [facility] Imaging     INSERT PORT VASCULAR ACCESS    1:00 PM   Malena Andrew PA-C    OR     Outpatient Visit    1:00 PM   [facility] 11     [facility] DRAW    7:15 AM   (15 min.)    [facility]   [facility] Lab Draw     P RETURN    7:35 AM   (50 min.)   Chiquis Negro PA-C   [facility] ONC INFUSION  240    8:00 AM   (240 min.)   UC ONCOLOGY INFUSION   [facility] 12       13     14     15     16     17     18     19       20     21     22     23     24     25     26       27     28     29     30     31 September 2017 Sunday Monday Tuesday Wednesday Thursday Friday Saturday                            1     2       3     4     5     6     7     8     9       10     11     12     13     14     15     16       17     18     19     20     21     22     23       24     25     26     27     28     29     30                  Lab Results:  No results found for this or any previous visit (from the past 12 hour(s)).            Follow-ups after your visit        Your next 10 appointments already scheduled     Aug 10, 2017   Procedure with Malena Andrew PA-C   [facility] ([facility])    [address] [place] [id number] [phone]           Located in the Clinics and Surgery Center at [address] [place] [address].   parking is very convenient and highly recommended.  is a $6 flat rate fee.  Both  and self parkers should enter the main arrival plaza from [facility]; parking attendants will direct you based on your parking preference.            Aug 10, 2017  1:00 PM CDT   (Arrive by 11:30 AM)   IR CHEST PORT PLACEMENT > 5 YRS OF AGE with [id number]   [facility] ([facility])    [address] [place] [id number]              1. Your doctor will need to do a history and physical within 7 days before this procedure. 2. Your doctor will which medications should not be taken the morning of the exam. 3. Laboratory tests are to be obtained by your doctor prior to the exam (Basic Metabolic Panel, CBCP, PTT and INR) (No labs needed if you are having a tunneled catheter exchange or removal) 4. If you have allergies to  x-ray contrast or iodine, contact your doctor or a Radiology nurse prior to the exam day for instructions. 5. Someone will need to drive you to and from the hospital. 6. If you are or may be pregnant, contact your doctor or a Radiology nurse prior to the day of the exam. 7. If you have diabetes, check with your doctor or a Radiology nurse to see if your insulin needs to be adjusted for the exam. 8. If you are taking a medication called Glucophage or Glucovance; these medications need to be held the day of the exam and for approximately 48 hours following. A blood sample must be drawn so your creatinine level can be checked before resuming this medication. 9. If you are taking Coumadin (to thin you blood) please contact your doctor or a Radiology nurse at least 3 days before the exam for special instructions. 10. You should not have received contrast within 48 hours of this exam. 11. The day before your exam you may eat your regular diet and are encouraged to drink at least 2 quarts of clear liquids. Drink no alcoholic beverages for 24 hours prior to the exam. 12. If you have a colostomy you will need to irrigate it with tap water at 8PM the evening before and again at 6AM the morning of the exam. 13. Do not smoke for 24 hours prior to the procedure. 14. Birth to 4 years: - Breast feeding must be stopped 4 hours prior to exam - Solid food or formula must be stopped 6 hours prior to exam - Tube feedings must be stopped 6 hours prior to exam 15. 4-10 years old: - Nothing to eat or drink 6 hours prior to exam 16. 10+ years old: - Nothing to eat or drink 8 hours prior to exam 17. The morning of the exam you may brush your teeth and take medications as directed with a sip of water. 18. When discharged, you cannot drive until morning, and an adult must be with you until then. You should stay in the [facility] overnight. 19. Bring a list of all drugs you are taking; include supplements and over-the-counter  "medications. Wear comfortable clothes and leave your valuables at home.            Aug 11, 2017  7:15 AM CDT   [facility] with [facility] DRAW   [facility] Lab Draw ([facility])    [address] [place] [id number] [phone]            Aug 11, 2017  7:50 AM CDT   (Arrive by 7:35 AM)   Return Visit with Chiquis Negro PA-C   [facility] ([facility])    [address] [place] [id number] [phone]            Aug 11, 2017  8:00 AM CDT   Infusion 240 with  ONCOLOGY INFUSION, UC 15 ATC   [facility] ([facility])    [address] [place] [id number] [phone]              Who to contact     If you have questions or need follow up information about today's clinic visit or your schedule please contact [facility] directly at [phone].  Normal or non-critical lab and imaging results will be communicated to you by [employer], letter or phone within 4 business days after the clinic has received the results. If you do not hear from us within 7 days, please contact the clinic through [employer] or phone. If you have a critical or abnormal lab result, we will notify you by phone as soon as possible.  Submit refill requests through [employer] or call your pharmacy and they will forward the refill request to us. Please allow 3 business days for your refill to be completed.          Additional Information About Your Visit        [employer] Information     [employer] lets you send messages to your doctor, view your test results, renew your prescriptions, schedule appointments and more. To sign up, go to [email or web address] . Click on \"Log in\" on the left side of the screen, which will take you to the Welcome page. Then click on \"Sign up Now\" on the right side of the page.     You will be asked " to enter the access code listed below, as well as some personal information. Please follow the directions to create your username and password.     Your access code is: [email or web address]  Expires: 10/25/2017  3:06 PM     Your access code will  in 90 days. If you need help or a new code, please call your [place] clinic or [phone].        Care EveryWhere ID     This is your Care EveryWhere ID. This could be used by other organizations to access your [place] medical records  [id number]        Your Vitals Were     Pulse Temperature Respirations Pulse Oximetry          85 98.4  F (36.9  C) (Oral) 16 99%         Blood Pressure from Last 3 Encounters:   17 129/80   17 134/89   17 125/86    Weight from Last 3 Encounters:   17 78.7 kg (173 lb 8 oz)   17 75.7 kg (166 lb 14.4 oz)              Today, you had the following     No orders found for display         Today's Medication Changes          These changes are accurate as of: 17 10:30 AM.  If you have any questions, ask your nurse or doctor.               Start taking these medicines.        Dose/Directions    LORazepam 0.5 MG tablet   Commonly known as:  ATIVAN   Used for:  Peritoneal carcinomatosis (H), Cancer of sigmoid colon (H)        Dose:  0.5 mg   Take 1 tablet (0.5 mg) by mouth every 4 hours as needed (Anxiety, Nausea/Vomiting or Sleep)   Quantity:  30 tablet   Refills:  2       ondansetron 8 MG tablet   Commonly known as:  ZOFRAN   Used for:  Peritoneal carcinomatosis (H), Cancer of sigmoid colon (H)        Dose:  8 mg   Take 1 tablet (8 mg) by mouth every 8 hours as needed (Nausea/Vomiting)   Quantity:  10 tablet   Refills:  2       prochlorperazine 10 MG tablet   Commonly known as:  COMPAZINE   Used for:  Peritoneal carcinomatosis (H), Cancer of sigmoid colon (H)        Dose:  10 mg   Take 1 tablet (10 mg) by mouth every 6 hours as needed (Nausea/Vomiting)   Quantity:  30 tablet   Refills:  2            Where to get  your medicines      These medications were sent to [facility] [place] [facility] [place], [place] - [address]  [address], [facility] [address]     Phone:  [phone]     ondansetron 8 MG tablet    prochlorperazine 10 MG tablet         Some of these will need a paper prescription and others can be bought over the counter.  Ask your nurse if you have questions.     Bring a paper prescription for each of these medications     LORazepam 0.5 MG tablet                Primary Care Provider Office Phone # Fax #    Waylon MARINO Han [phone]       [facility] [address]  [facility] [address]        Equal Access to Services     [facility]: Hadii karrie reddy hadasho Soshu, waaxda luqadaha, qaybta kaalmada adebenitayada, bhargav sage . [facility] [phone].    ATENCIÓN: Si habla español, tiene a cid disposición servicios gratuitos de asistencia lingüística. [facility] [phone].    We comply with applicable federal civil rights laws and [place] laws. We do not discriminate on the basis of race, color, national origin, age, disability sex, sexual orientation or gender identity.            Thank you!     Thank you for choosing [facility]  for your care. Our goal is always to provide you with excellent care. Hearing back from our patients is one way we can continue to improve our services. Please take a few minutes to complete the written survey that you may receive in the mail after your visit with us. Thank you!             Your Updated Medication List - Protect others around you: Learn how to safely use, store and throw away your medicines at www.disposemymeds.org.          This list is accurate as of: 8/8/17 10:30 AM.  Always use your most recent med list.                   Brand Name Dispense Instructions for use Diagnosis    amoxicillin-clavulanate 875-125 MG per tablet    AUGMENTIN    14 tablet    Take 1 tablet by mouth  2 times daily for 7 days    Cellulitis of abdominal wall       cholestyramine 4 G Packet    QUESTRAN     Take 2 packets by mouth 3 times daily (with meals)        IRON SUPPLEMENT PO           loperamide 2 MG capsule    IMODIUM     Take 2 mg by mouth 4 times daily as needed for diarrhea        LORazepam 0.5 MG tablet    ATIVAN    30 tablet    Take 1 tablet (0.5 mg) by mouth every 4 hours as needed (Anxiety, Nausea/Vomiting or Sleep)    Peritoneal carcinomatosis (H), Cancer of sigmoid colon (H)       ondansetron 8 MG tablet    ZOFRAN    10 tablet    Take 1 tablet (8 mg) by mouth every 8 hours as needed (Nausea/Vomiting)    Peritoneal carcinomatosis (H), Cancer of sigmoid colon (H)       prochlorperazine 10 MG tablet    COMPAZINE    30 tablet    Take 1 tablet (10 mg) by mouth every 6 hours as needed (Nausea/Vomiting)    Peritoneal carcinomatosis (H), Cancer of sigmoid colon (H)

## 2017-08-08 NOTE — PATIENT INSTRUCTIONS
Contact Numbers    [facility] Main Line: [phone]  [facility] Triage:  [phone]    Call triage with chills and/or temperature greater than or equal to 100.5, uncontrolled nausea/vomiting, diarrhea, constipation, dizziness, shortness of breath, chest pain, bleeding, unexplained bruising, or any new/concerning symptoms, questions/concerns.     If you are having any concerning symptoms or wish to speak to a provider before your next infusion visit, please call your care coordinator or triage to notify them so we can adequately serve you.       After Hours: [phone]    If after hours, weekends, or holidays, call main hospital  and ask for Oncology doctor on call.         August 2017 Sunday Monday Tuesday Wednesday Thursday Friday Saturday             1     2     3     [facility] NEW    3:30 PM   (60 min.)   Albert Long MD   [facility] 4     5       6     7     8     P ONC INFUSION 360    8:00 AM   (360 min.)    ONCOLOGY INFUSION   [facility] 9     10     IR CHEST PORT PLACEMENT >5 YRS   11:30 AM   (60 min.)   [email or web address]   [facility] ASC Imaging     INSERT PORT VASCULAR ACCESS    1:00 PM   Malena Andrew PA-C    OR     Outpatient Visit    1:00 PM   [facility] 11     [facility] DRAW    7:15 AM   (15 min.)    [facility] DRAW   [facility] Lab Draw     P RETURN    7:35 AM   (50 min.)   Chiquis Negro PA-C   [facility] ONC INFUSION 240    8:00 AM   (240 min.)    ONCOLOGY INFUSION   [facility] 12       13     14     15     16     17     18     19       20     21     22     23     24     25     26       27     28     29     30     31 September 2017 Sunday Monday Tuesday Wednesday Thursday Friday Saturday                            1     2       3     4     5     6     7     8     9       10     11     12     13     14     15     16       17     18      19     20     21     22     23       24     25     26     27     28     29     30                  Lab Results:  No results found for this or any previous visit (from the past 12 hour(s)).

## 2017-08-08 NOTE — PROGRESS NOTES
Infusion Nursing Note:  Patient presents today for Infed.    Patient seen by provider today: No    Note: Patient new to oncology infusion room and is receiving Infed for the first time. Pt oriented to infusion room and call light. New patient teaching done previously. Pt received new pt folder prior to coming to infusion. Writer reinforced chemotherapy teaching/side effects and schedule.   Copy of AVS reviewed with patient. Pt instructed to call care coordinator, triage (or MD on call if after hours/weekends) with chills/temp >=100.5, questions/concerns. Pt stated understanding of plan.   Patient states he received a binder on [employer] and has been reading through the material in preparation for cycle on this Friday. At this time, patient does not have questions. Patient sent home with antiemetics and pharmacy teaching.  Intravenous Access:  Peripheral IV placed.    Treatment Conditions:  Not Applicable.    Post Infusion Assessment:  Patient tolerated infusion without incident.  Patient observed for 60 minutes post infed test dose per protocol.  Blood return noted pre and post infusion.  Site patent and intact, free from redness, edema or discomfort.  No evidence of extravasations.  Access discontinued per protocol.    Discharge Plan:   Prescription refills given for compazine, zofran and ativan.  Discharge instructions reviewed with: Patient.  Patient and/or family verbalized understanding of discharge instructions and all questions answered.  Copy of AVS reviewed with patient and/or family.  Patient will return Friday for next infusion appointment.  Patient discharged in stable condition accompanied by: self.  Departure Mode: Ambulatory.  Face to Face time: 0 minutes.    Barak Frank RN.

## 2017-08-09 ENCOUNTER — ANESTHESIA EVENT (OUTPATIENT)
Dept: SURGERY | Facility: AMBULATORY SURGERY CENTER | Age: 52
End: 2017-08-09

## 2017-08-10 ENCOUNTER — SURGERY (OUTPATIENT)
Age: 52
End: 2017-08-10

## 2017-08-10 ENCOUNTER — ANESTHESIA (OUTPATIENT)
Dept: SURGERY | Facility: AMBULATORY SURGERY CENTER | Age: 52
End: 2017-08-10

## 2017-08-10 ENCOUNTER — HOSPITAL ENCOUNTER (OUTPATIENT)
Facility: AMBULATORY SURGERY CENTER | Age: 52
End: 2017-08-10
Attending: PHYSICIAN ASSISTANT

## 2017-08-10 VITALS
DIASTOLIC BLOOD PRESSURE: 73 MMHG | TEMPERATURE: 96.3 F | WEIGHT: 173.8 LBS | HEIGHT: 69 IN | RESPIRATION RATE: 16 BRPM | BODY MASS INDEX: 25.74 KG/M2 | SYSTOLIC BLOOD PRESSURE: 107 MMHG | OXYGEN SATURATION: 99 %

## 2017-08-10 LAB
ERYTHROCYTE [DISTWIDTH] IN BLOOD BY AUTOMATED COUNT: 15.9 % (ref 10–15)
HCT VFR BLD AUTO: 30.6 % (ref 40–53)
HGB BLD-MCNC: 9.3 G/DL (ref 13.3–17.7)
INR PPP: 1.12 (ref 0.86–1.14)
MCH RBC QN AUTO: 23.9 PG (ref 26.5–33)
MCHC RBC AUTO-ENTMCNC: 30.4 G/DL (ref 31.5–36.5)
MCV RBC AUTO: 79 FL (ref 78–100)
PLATELET # BLD AUTO: 469 10E9/L (ref 150–450)
RBC # BLD AUTO: 3.89 10E12/L (ref 4.4–5.9)
WBC # BLD AUTO: 7 10E9/L (ref 4–11)

## 2017-08-10 DEVICE — CATH PORT POWERPORT CLEARVUE ISP 8FR 5608062: Type: IMPLANTABLE DEVICE | Site: CHEST  WALL | Status: FUNCTIONAL

## 2017-08-10 RX ORDER — HEPARIN SODIUM (PORCINE) LOCK FLUSH IV SOLN 100 UNIT/ML 100 UNIT/ML
5 SOLUTION INTRAVENOUS
Status: DISCONTINUED | OUTPATIENT
Start: 2017-08-10 | End: 2017-08-11 | Stop reason: HOSPADM

## 2017-08-10 RX ORDER — PROPOFOL 10 MG/ML
INJECTION, EMULSION INTRAVENOUS PRN
Status: DISCONTINUED | OUTPATIENT
Start: 2017-08-10 | End: 2017-08-10

## 2017-08-10 RX ORDER — FENTANYL CITRATE 50 UG/ML
25-50 INJECTION, SOLUTION INTRAMUSCULAR; INTRAVENOUS EVERY 5 MIN PRN
Status: DISCONTINUED | OUTPATIENT
Start: 2017-08-10 | End: 2017-08-11 | Stop reason: HOSPADM

## 2017-08-10 RX ORDER — NALOXONE HYDROCHLORIDE 0.4 MG/ML
.1-.4 INJECTION, SOLUTION INTRAMUSCULAR; INTRAVENOUS; SUBCUTANEOUS
Status: DISCONTINUED | OUTPATIENT
Start: 2017-08-10 | End: 2017-08-11 | Stop reason: HOSPADM

## 2017-08-10 RX ORDER — LIDOCAINE 40 MG/G
CREAM TOPICAL
Status: DISCONTINUED | OUTPATIENT
Start: 2017-08-10 | End: 2017-08-11 | Stop reason: HOSPADM

## 2017-08-10 RX ORDER — ONDANSETRON 2 MG/ML
4 INJECTION INTRAMUSCULAR; INTRAVENOUS EVERY 30 MIN PRN
Status: DISCONTINUED | OUTPATIENT
Start: 2017-08-10 | End: 2017-08-11 | Stop reason: HOSPADM

## 2017-08-10 RX ORDER — MEPERIDINE HYDROCHLORIDE 25 MG/ML
12.5 INJECTION INTRAMUSCULAR; INTRAVENOUS; SUBCUTANEOUS
Status: DISCONTINUED | OUTPATIENT
Start: 2017-08-10 | End: 2017-08-11 | Stop reason: HOSPADM

## 2017-08-10 RX ORDER — SODIUM CHLORIDE, SODIUM LACTATE, POTASSIUM CHLORIDE, CALCIUM CHLORIDE 600; 310; 30; 20 MG/100ML; MG/100ML; MG/100ML; MG/100ML
INJECTION, SOLUTION INTRAVENOUS CONTINUOUS
Status: DISCONTINUED | OUTPATIENT
Start: 2017-08-10 | End: 2017-08-11 | Stop reason: HOSPADM

## 2017-08-10 RX ORDER — SODIUM CHLORIDE 9 MG/ML
INJECTION, SOLUTION INTRAVENOUS CONTINUOUS
Status: DISCONTINUED | OUTPATIENT
Start: 2017-08-10 | End: 2017-08-11 | Stop reason: HOSPADM

## 2017-08-10 RX ORDER — HEPARIN SODIUM,PORCINE 10 UNIT/ML
5 VIAL (ML) INTRAVENOUS EVERY 24 HOURS
Status: DISCONTINUED | OUTPATIENT
Start: 2017-08-10 | End: 2017-08-11 | Stop reason: HOSPADM

## 2017-08-10 RX ORDER — LIDOCAINE HYDROCHLORIDE 20 MG/ML
INJECTION, SOLUTION INFILTRATION; PERINEURAL PRN
Status: DISCONTINUED | OUTPATIENT
Start: 2017-08-10 | End: 2017-08-10

## 2017-08-10 RX ORDER — ONDANSETRON 4 MG/1
4 TABLET, ORALLY DISINTEGRATING ORAL EVERY 30 MIN PRN
Status: DISCONTINUED | OUTPATIENT
Start: 2017-08-10 | End: 2017-08-11 | Stop reason: HOSPADM

## 2017-08-10 RX ORDER — HEPARIN SODIUM (PORCINE) LOCK FLUSH IV SOLN 100 UNIT/ML 100 UNIT/ML
5 SOLUTION INTRAVENOUS ONCE
Status: DISCONTINUED | OUTPATIENT
Start: 2017-08-10 | End: 2017-08-11 | Stop reason: HOSPADM

## 2017-08-10 RX ADMIN — PROPOFOL 20 MG: 10 INJECTION, EMULSION INTRAVENOUS at 12:32

## 2017-08-10 RX ADMIN — SODIUM CHLORIDE, SODIUM LACTATE, POTASSIUM CHLORIDE, CALCIUM CHLORIDE: 600; 310; 30; 20 INJECTION, SOLUTION INTRAVENOUS at 12:00

## 2017-08-10 RX ADMIN — PROPOFOL 20 MG: 10 INJECTION, EMULSION INTRAVENOUS at 12:24

## 2017-08-10 RX ADMIN — PROPOFOL 20 MG: 10 INJECTION, EMULSION INTRAVENOUS at 12:47

## 2017-08-10 RX ADMIN — PROPOFOL 20 MG: 10 INJECTION, EMULSION INTRAVENOUS at 12:37

## 2017-08-10 RX ADMIN — PROPOFOL 20 MG: 10 INJECTION, EMULSION INTRAVENOUS at 12:35

## 2017-08-10 RX ADMIN — PROPOFOL 20 MG: 10 INJECTION, EMULSION INTRAVENOUS at 12:23

## 2017-08-10 RX ADMIN — PROPOFOL 20 MG: 10 INJECTION, EMULSION INTRAVENOUS at 12:59

## 2017-08-10 RX ADMIN — Medication 15 ML: at 13:06

## 2017-08-10 RX ADMIN — LIDOCAINE HYDROCHLORIDE 60 MG: 20 INJECTION, SOLUTION INFILTRATION; PERINEURAL at 12:22

## 2017-08-10 RX ADMIN — PROPOFOL 20 MG: 10 INJECTION, EMULSION INTRAVENOUS at 12:48

## 2017-08-10 RX ADMIN — PROPOFOL 20 MG: 10 INJECTION, EMULSION INTRAVENOUS at 12:26

## 2017-08-10 RX ADMIN — PROPOFOL 20 MG: 10 INJECTION, EMULSION INTRAVENOUS at 12:49

## 2017-08-10 RX ADMIN — PROPOFOL 20 MG: 10 INJECTION, EMULSION INTRAVENOUS at 12:39

## 2017-08-10 RX ADMIN — PROPOFOL 20 MG: 10 INJECTION, EMULSION INTRAVENOUS at 12:45

## 2017-08-10 RX ADMIN — PROPOFOL 20 MG: 10 INJECTION, EMULSION INTRAVENOUS at 12:43

## 2017-08-10 RX ADMIN — PROPOFOL 20 MG: 10 INJECTION, EMULSION INTRAVENOUS at 12:25

## 2017-08-10 RX ADMIN — PROPOFOL 20 MG: 10 INJECTION, EMULSION INTRAVENOUS at 12:28

## 2017-08-10 RX ADMIN — PROPOFOL 20 MG: 10 INJECTION, EMULSION INTRAVENOUS at 12:50

## 2017-08-10 RX ADMIN — PROPOFOL 20 MG: 10 INJECTION, EMULSION INTRAVENOUS at 12:33

## 2017-08-10 RX ADMIN — PROPOFOL 20 MG: 10 INJECTION, EMULSION INTRAVENOUS at 12:51

## 2017-08-10 RX ADMIN — PROPOFOL 20 MG: 10 INJECTION, EMULSION INTRAVENOUS at 12:31

## 2017-08-10 RX ADMIN — PROPOFOL 20 MG: 10 INJECTION, EMULSION INTRAVENOUS at 12:57

## 2017-08-10 RX ADMIN — PROPOFOL 20 MG: 10 INJECTION, EMULSION INTRAVENOUS at 12:55

## 2017-08-10 RX ADMIN — PROPOFOL 20 MG: 10 INJECTION, EMULSION INTRAVENOUS at 12:42

## 2017-08-10 RX ADMIN — PROPOFOL 20 MG: 10 INJECTION, EMULSION INTRAVENOUS at 12:27

## 2017-08-10 RX ADMIN — PROPOFOL 20 MG: 10 INJECTION, EMULSION INTRAVENOUS at 12:29

## 2017-08-10 RX ADMIN — PROPOFOL 20 MG: 10 INJECTION, EMULSION INTRAVENOUS at 12:34

## 2017-08-10 RX ADMIN — PROPOFOL 20 MG: 10 INJECTION, EMULSION INTRAVENOUS at 12:44

## 2017-08-10 ASSESSMENT — LIFESTYLE VARIABLES: TOBACCO_USE: 0

## 2017-08-10 ASSESSMENT — COPD QUESTIONNAIRES: COPD: 0

## 2017-08-10 NOTE — PROGRESS NOTES
HEMATOLOGY/ONCOLOGY PROGRESS NOTE  Aug 11, 2017    REASON FOR VISIT: follow-up of sigmoid adenocarcinoma with peritoneal carcinomatosis    DIAGNOSIS:   Sebas Lopez is a 52-year-old male with history of ulcerative colitis who presented with abdominal pain in May 2017. Imaging performed at that time showing sigmoid wall thickening with adjacent mesenteric lymphadenopathy with free fluid near the abdominal wall mess from prior hernia surgery. He underwent a colonoscopy that showed an obstructing sigmoid colon mass. Biopsy of the mass showing sigmoid adenocarcinoma. He then developed obstructive symptoms and underwent an exploratory laparotomy on 7/10/2017. During that procedure, he was found to have diffuse peritoneal carcinomatosis. Extensive lysis of adhesions was performed and a small bowel resection was performed with end ileostomy. The biopsies from multiple large peritoneal metastases were also positive for metastatic adenocarcinoma. He is starting FOLFOX (5-FU, oxalipaltin, leucovorin) today.    INTERVAL HISTORY:   Sebas is here with his wife to start FOLFOX.    He is feeling ready to go ahead today. He feels well. He baseline has a high-output ostomy that he manages with Imodium 2 tablets every 4 hours or so at the maximum. He also uses cholestyramine but only really finds this helpful at night. Mostly a hassle during the day due to needing to take it apart from other meds and doesn't find that it makes a difference when he misses a dose. Drinking really well. No fevers, chills, cough, SOB, chest pain, nausea, vomiting, abdominal pain, pain anywhere, constipation, bleeding, swelling, or neuropathy at baseline.         Current Outpatient Prescriptions   Medication Sig Dispense Refill     LORazepam (ATIVAN) 0.5 MG tablet Take 1 tablet (0.5 mg) by mouth every 4 hours as needed (Anxiety, Nausea/Vomiting or Sleep) 30 tablet 2     prochlorperazine (COMPAZINE) 10 MG tablet Take 1 tablet (10 mg) by mouth every 6 hours  "as needed (Nausea/Vomiting) 30 tablet 2     ondansetron (ZOFRAN) 8 MG tablet Take 1 tablet (8 mg) by mouth every 8 hours as needed (Nausea/Vomiting) 10 tablet 2     amoxicillin-clavulanate (AUGMENTIN) 875-125 MG per tablet Take 1 tablet by mouth 2 times daily for 7 days 14 tablet 0     cholestyramine (QUESTRAN) 4 G Packet Take 2 packets by mouth 3 times daily (with meals)       loperamide (IMODIUM) 2 MG capsule Take 2 mg by mouth 4 times daily as needed for diarrhea       Ferrous Sulfate (IRON SUPPLEMENT PO)           No Known Allergies    PHYSICAL EXAMINATION  /75 (BP Location: Right arm, Cuff Size: Adult Regular)  Pulse 93  Temp 98.6  F (37  C) (Oral)  Resp 16  Ht 1.753 m (5' 9\")  Wt 77 kg (169 lb 12.8 oz)  SpO2 99%  BMI 25.08 kg/m2  Constitutional: Alert, oriented male in no visible distress.  Eyes: PERRL. Anicteric sclerae.  ENT/Mouth: OM moist and pink without lesions or thrush.  CV: RRR, no murmurs appreciated.  Resp: CTAB throughout  Abdomen: Soft, non-tender, non-distended. Bowel sounds present. No masses appreciated. No organomegaly noted. Ostomy at RLQ draining liquid brown and semi-formed stool  Extremities: No lower extremity edema appreciated.  Skin: Warm, dry. No bruising or petechiae noted.  Lymph: No cervical or supraclavicular lymphadenopathy appreciated.   Neuro: CN II-XII grossly intact.    LABS:  Results for orders placed or performed in visit on 08/10/17 (from the past 24 hour(s))   IR Chest Port Placement > 5 Yrs of Age    Narrative    PRE-PROCEDURE DIAGNOSIS:  Cancer of sigmoid colon; peritoneal  carcinomatosis     POST-PROCEDURE DIAGNOSIS:  Same    PROCEDURE: Chest port placement    Impression    IMPRESSION: Completed image-guided placement of 8 French, 24 cm single  lumen power-injectable central venous port via right internal jugular  vein. Catheter tip in the high right atrium. Aspirates and flushes  freely, heparin locked and is ready for immediate use. " No  complication.    ----------    CLINICAL HISTORY:  Cancer of sigmoid colon; peritoneal carcinomatosis   Chest port placement requested. Pt has infusion appointment tomorrow  morning, request for port to be left accessed today.     PERFORMED BY:  Malena Andrew PA-C    CONSENT:  The patient understood the limitations, alternatives, and  risks of the procedure and agreed to the procedure.  Written informed  consent was obtained and is documented in the patient record.    SEDATION: Monitored anesthesia care    MEDICATIONS: A 10:1 volume mixture of 1% lidocaine without epinephrine  buffered with 8.4% bicarbonate solution was available for local  anesthesia. The port was heparin locked upon completion of placement.    FLUOROSCOPY TIME: .3 minutes    DESCRIPTION:  The right neck and upper chest were prepped and draped  in the usual sterile fashion.      Under ultrasound guidance, the right internal jugular vein was  identified and the overlying skin was anesthetized and skin  dermatotomy was made.  Under ultrasound guidance, right internal  jugular venipuncture was made with needle.  Image saved documenting  venipuncture and patency.    Needle was exchanged over guidewire for a dilator under fluoroscopic  guidance.  Length to right atrium was measured with guidewire.   Guidewire and inner dilator were removed.  Wire was advanced into  inferior vena cava under fluoroscopic guidance and secured.     The anterior right chest skin was anesthetized and incision was made.   A subcutaneous port pocket was created using a combination of sharp  and blunt dissection.  The pocket was irrigated with saline and packed  with gauze to obtain hemostasis.  The gauze was removed.      Port catheter was subcutaneously tunneled from the anterior chest  pocket to the internal jugular venipuncture site after path of tunnel  was anesthetized.  Catheter cut to length. The dilator was exchanged  over guidewire for a peel-away sheath.  Guidewire  was removed.  Under  fluoroscopic guidance, the catheter was placed through the peel-away  sheath and positioned with its tip in the right atrium.  Peel-away  sheath was removed.      Final port and catheter position saved.  Port aspirated and flushed  freely.  The chest incision was closed with three 3-0 Vicryl  interrupted sutures, a running 4-0 Monocryl subcuticular suture, and  topical adhesive.  The skin dermatotomy site overlying the internal  jugular venipuncture was closed with topical adhesive. Port accessed  with 3/4 inch access needle. Aspirated and flushed freely and was  heparin locked before sterile dressing applied.    COMPLICATIONS:  No immediate complication. The patient remained stable  throughout the procedure and tolerated it well.    ESTIMATED BLOOD LOSS:  Minimal    SPECIMENS:  None    MARK HERNANDEZ PA-C         IMPRESSION/PLAN:  Sebas Lopez is a 52 year old male with history of ulcerative colitis, now with sigmoid adenocarcinoma with peritoneal carcinomatosis, status-post ex-lap on 7/10 with extensive lysis of adhesions and small-bowel resection with end ileostomy.    Sigmoid cancer with peritoneal carcinomatosis:  Here to start  FOLFOX. We reviewed the potential side effects and anticipatory side effect management in detail. He was agreeable to start chemotherapy today. He will follow-up with cycle 2 for toxicity assessment.  - Plan to re-image after 4-6 cycles and return to see Dr. Dudley  - He may potentially be able to go on to cytoreductive surgery with [facility] as mentioned in Dr. Dudley's note  - Seeing genetics in September    High-output ostomy: Chronic since surgery. Currently on Imodium up to 8 tablets/daily with Cholestyramine. He doesn't know if the latter actually works during the day but does find it helps at night. Discussed possibility for increased output with this regimen and sent him with an Rx of Lomotil to fill should the Imodium not be enough. Counseled on  symptoms of dehydration and to call with any symptoms of that.    Iron deficiency anemia: secondary to chronic blood loss. Received Infed 8/8. Stable today but will need to monitor.    ID: Completed Augmentin for infected suture, resolved. No infectious issue today. Counseled to call with fevers > 100.4    I spent >40 minutes with the patient, with over 50% of the time spent counseling or coordinating their care as described above.      Chiquis Negro PA-C  Hematology, Oncology, and Transplant  [facility]  [address] [place] [id number] [phone]

## 2017-08-10 NOTE — ANESTHESIA POSTPROCEDURE EVALUATION
Patient: Sebas Lopez    Procedure(s):  Single Lumen Right Chest Power Port - Wound Class: I-Clean    Diagnosis:Peritoneal Carcinamatosis  Diagnosis Additional Information: No value filed.    Anesthesia Type:  MAC    Note:  Anesthesia Post Evaluation    Patient location during evaluation: Phase 2  Patient participation: Able to fully participate in evaluation  Level of consciousness: awake and alert  Pain management: adequate  Airway patency: patent  Cardiovascular status: acceptable  Respiratory status: acceptable  Hydration status: acceptable  PONV: none     Anesthetic complications: None          Last vitals:  Vitals:    08/10/17 1125 08/10/17 1328   BP: 118/86 107/73   Resp: 16 16   Temp: 36.7  C (98  F) 35.7  C (96.3  F)   SpO2: 99% 99%         Electronically Signed By: Trent Melo MD  August 10, 2017  1:58 PM

## 2017-08-10 NOTE — DISCHARGE INSTRUCTIONS
A collaboration between [facility] and [facility]  Experts in minimally invasive, targeted treatments performed using imaging guidance    Venous Access Device,  Port Catheter or Tunneled Central Line Placement    Today you had a procedure today to install a venous access device; either a tunneled central vein catheter or a subcutaneous port catheter.    One of our Radiology PAs performed this procedure for you today:  ? Meliton Richey PA-C  ? MARCUS Pedro PA-C  ? Chito Martinez PA-C  ? Malena Andrew PA-C   ? Gerry Gomez PA-C    After you go home:  - Drink plenty of fluids.  Generally 6-8 (8 ounce) glasses a day is recommended.  - Resume your regular diet unless otherwise ordered by a medical provider.  - Keep any applied tape/gauze dressings clean and dry.  Change tape/gauze dressings if they get wet or soiled.  - You may shower the following day after procedure, however cover and protect from moisture any tape/gauze dressings.  You may let water hit and run over dried skin glue, but do not scrub.  Pat the area dry after showering.  - Port placement incisions are closed with absorbable suture, meaning they do not need to be removed at a later date, and a topical skin adhesive (skin glue).  This glue will wear off in 7-14 days.  Do not remove before this time.  If 14 days have passed and residual glue is present, you may gently remove it.  - Do not apply gels, lotions, or ointments to the glue site for the first 10 days as this may cause the glue to prematurely soften and fail.  - Do not perform strenuous activities or lift greater than 10 pounds for the next three days.  - If there is bleeding or oozing from the procedure site, apply firm pressure to the area for 5-10 minutes.  If the bleeding continues seek medical advice at the numbers below.  - Mild procedure site discomfort can be treated with an ice pack and over-the-counter pain  relievers.        For 24 hours after any sedation used:  - Relax and take it easy.  No strenuous activities.  - Do not drive or operate machines at home or at work.  - No alcohol consumption.  - Do not make any important or legal decisions.    Call our Interventional Radiology (IR) service if:  - If you start bleeding from the procedure site.  If you do start to bleed from the site, lie down and hold some pressure on the site.  Our radiology provider can help you decide if you need to return to the hospital.  - If you have new or worsening pain related to the procedure.  - If you have concerning swelling at the procedure site.  - If you develop persistent nausea or vomiting.  - If you develop hives or a rash or any unexplained itching.  - If you have a fever of greater than 100.5  F and chills in the first 5 days after procedure.  - Any other concerns related to your procedure.      [facility]  Interventional Radiology (IR)  [address] [place] [address]    Contact Number:  [phone]  (IR control desk)  - Monday - Friday 8:00 am - 4:30 pm    After hours for urgent concerns:  [phone]  - After 4:30 pm Monday - Friday, Weekends and Holidays.   - Ask for Interventional Radiology on-call.  Someone is available 24 hours a day.  - [facility] toll free number:  [phone]

## 2017-08-10 NOTE — IP AVS SNAPSHOT
MRN:[id number]                      After Visit Summary   8/10/2017    Sebas Lopez    MRN: [id number]           Thank you!     Thank you for choosing [facility] for your care. Our goal is always to provide you with excellent care. Hearing back from our patients is one way we can continue to improve our services. Please take a few minutes to complete the written survey that you may receive in the mail after you visit with us. Thank you!        Patient Information     Date Of Birth          1965        About your hospital stay     You were admitted on:  August 10, 2017 You last received care in the:  [facility]    You were discharged on:  August 10, 2017       Who to Call     For medical emergencies, please call 911.  For non-urgent questions about your medical care, please call your primary care provider or clinic, [phone]  For questions related to your surgery, please call your surgery clinic        Attending Provider     Provider Malena Hein PA-C Physician Assistant       Primary Care Provider Office Phone # Fax #    Waylon MARINO Han [phone]      Your next 10 appointments already scheduled     Aug 11, 2017  7:15 AM CDT   [facility] Lab Draw with  [facility] LAB DRAW   [facility] Draw ([facility])    [address] [place] [id number] [phone]            Aug 11, 2017  7:50 AM CDT   (Arrive by 7:35 AM)   Return Visit with Chiquis Negro PA-C   [facility] ([facility])    [address] [place] [id number]   [phone]            Aug 11, 2017  8:00 AM CDT   Infusion 240 with [facility] ONCOLOGY INFUSION, UC 15 ATC   [facility] ([facility])    [address] [place] [id number]   [phone]            Sep 11, 2017  9:00 AM  CDT   (Arrive by 8:45 AM)   NEW WITH ROOM with Viola Vazquez GC,  2 114 CONSULT RM   [facility] ([facility])    [address]  2nd Floor  [facility] [id number] [phone]            Sep 11, 2017 10:15 AM CDT   [facility] Lab Draw with  [facility] LAB DRAW   [facility])    [address]  2nd Floor  [facility] [id number] [phone]              Further instructions from your care team         A collaboration between [facility] and [facility]  Experts in minimally invasive, targeted treatments performed using imaging guidance    Venous Access Device,  Port Catheter or Tunneled Central Line Placement    Today you had a procedure today to install a venous access device; either a tunneled central vein catheter or a subcutaneous port catheter.    One of our Radiology PAs performed this procedure for you today:  ? Meliton Richey PA-C  ? MARCUS Pedro PA-C  ? Chito Martinez PA-C  ? Malena Andrew PA-C   ? Gerry Gomez PA-C    After you go home:  - Drink plenty of fluids.  Generally 6-8 (8 ounce) glasses a day is recommended.  - Resume your regular diet unless otherwise ordered by a medical provider.  - Keep any applied tape/gauze dressings clean and dry.  Change tape/gauze dressings if they get wet or soiled.  - You may shower the following day after procedure, however cover and protect from moisture any tape/gauze dressings.  You may let water hit and run over dried skin glue, but do not scrub.  Pat the area dry after showering.  - Port placement incisions are closed with absorbable suture, meaning they do not need to be removed at a later date, and a topical skin adhesive (skin glue).  This glue will wear off in 7-14 days.  Do not remove before this time.  If 14 days have passed and residual glue is present, you may gently remove it.  - Do  not apply gels, lotions, or ointments to the glue site for the first 10 days as this may cause the glue to prematurely soften and fail.  - Do not perform strenuous activities or lift greater than 10 pounds for the next three days.  - If there is bleeding or oozing from the procedure site, apply firm pressure to the area for 5-10 minutes.  If the bleeding continues seek medical advice at the numbers below.  - Mild procedure site discomfort can be treated with an ice pack and over-the-counter pain relievers.        For 24 hours after any sedation used:  - Relax and take it easy.  No strenuous activities.  - Do not drive or operate machines at home or at work.  - No alcohol consumption.  - Do not make any important or legal decisions.    Call our Interventional Radiology (IR) service if:  - If you start bleeding from the procedure site.  If you do start to bleed from the site, lie down and hold some pressure on the site.  Our radiology provider can help you decide if you need to return to the hospital.  - If you have new or worsening pain related to the procedure.  - If you have concerning swelling at the procedure site.  - If you develop persistent nausea or vomiting.  - If you develop hives or a rash or any unexplained itching.  - If you have a fever of greater than 100.5  F and chills in the first 5 days after procedure.  - Any other concerns related to your procedure.      [facility]  Interventional Radiology (IR)  [address] [place] [address]    Contact Number:  [phone]  (IR control desk)  - Monday - Friday 8:00 am - 4:30 pm    After hours for urgent concerns:  [phone]  - After 4:30 pm Monday - Friday, Weekends and Holidays.   - Ask for Interventional Radiology on-call.  Someone is available 24 hours a day.  - [facility] toll free number:  [phone]          Pending Results     Date and Time Order Name Status Description    8/10/2017  "1201 IR CHEST PORT PLACEMENT > 5 YRS OF AGE In process             Admission Information     Date & Time Provider Department Dept. Phone    8/10/2017 Malena Andrew PA-C [facility] [phone]      Your Vitals Were     Blood Pressure Temperature Respirations Height Weight Pulse Oximetry    118/86 98  F (36.7  C) (Oral) 16 1.753 m (5' 9\") 78.8 kg (173 lb 12.8 oz) 99%    BMI (Body Mass Index)                   25.67 kg/m2           [facility] [employer] Information     [employer] gives you secure access to your electronic health record. If you see a primary care provider, you can also send messages to your care team and make appointments. If you have questions, please call your primary care clinic.  If you do not have a primary care provider, please call [phone] and they will assist you.      [employer] is an electronic gateway that provides easy, online access to your medical records. With [employer], you can request a clinic appointment, read your test results, renew a prescription or communicate with your care team.     To access your existing account, please contact your [facility] or call [phone] for assistance.        Care EveryWhere ID     This is your Care EveryWhere ID. This could be used by other organizations to access your [facility] medical records  [id number]        Equal Access to Services     SARAH DUNN : Hadii aad ku hadasho Soyolandaali, waaxda luqadaha, qaybta kaalmada adeegyada, bhargav willis. So [facility] [phone].    ATENCIÓN: Si habla español, tiene a cid disposición servicios gratuitos de asistencia lingüística. Llame al [phone].    We comply with applicable federal civil rights laws and [place] laws. We do not discriminate on the basis of race, color, national origin, age, disability sex, sexual orientation or gender identity.               Review of your medicines      UNREVIEWED medicines. Ask your doctor about " these medicines        Dose / Directions    amoxicillin-clavulanate 875-125 MG per tablet   Commonly known as:  AUGMENTIN   Used for:  Cellulitis of abdominal wall        Dose:  1 tablet   Take 1 tablet by mouth 2 times daily for 7 days   Quantity:  14 tablet   Refills:  0       cholestyramine 4 G Packet   Commonly known as:  QUESTRAN        Dose:  2 packet   Take 2 packets by mouth 3 times daily (with meals)   Refills:  0       IRON SUPPLEMENT PO        Refills:  0       loperamide 2 MG capsule   Commonly known as:  IMODIUM        Dose:  2 mg   Take 2 mg by mouth 4 times daily as needed for diarrhea   Refills:  0       LORazepam 0.5 MG tablet   Commonly known as:  ATIVAN   Used for:  Peritoneal carcinomatosis (H), Cancer of sigmoid colon (H)        Dose:  0.5 mg   Take 1 tablet (0.5 mg) by mouth every 4 hours as needed (Anxiety, Nausea/Vomiting or Sleep)   Quantity:  30 tablet   Refills:  2       ondansetron 8 MG tablet   Commonly known as:  ZOFRAN   Used for:  Peritoneal carcinomatosis (H), Cancer of sigmoid colon (H)        Dose:  8 mg   Take 1 tablet (8 mg) by mouth every 8 hours as needed (Nausea/Vomiting)   Quantity:  10 tablet   Refills:  2       prochlorperazine 10 MG tablet   Commonly known as:  COMPAZINE   Used for:  Peritoneal carcinomatosis (H), Cancer of sigmoid colon (H)        Dose:  10 mg   Take 1 tablet (10 mg) by mouth every 6 hours as needed (Nausea/Vomiting)   Quantity:  30 tablet   Refills:  2                Protect others around you: Learn how to safely use, store and throw away your medicines at www.disposemymeds.org.             Medication List: This is a list of all your medications and when to take them. Check marks below indicate your daily home schedule. Keep this list as a reference.      Medications           Morning Afternoon Evening Bedtime As Needed    amoxicillin-clavulanate 875-125 MG per tablet   Commonly known as:  AUGMENTIN   Take 1 tablet by mouth 2 times daily for 7 days                                 cholestyramine 4 G Packet   Commonly known as:  QUESTRAN   Take 2 packets by mouth 3 times daily (with meals)                                IRON SUPPLEMENT PO                                loperamide 2 MG capsule   Commonly known as:  IMODIUM   Take 2 mg by mouth 4 times daily as needed for diarrhea                                LORazepam 0.5 MG tablet   Commonly known as:  ATIVAN   Take 1 tablet (0.5 mg) by mouth every 4 hours as needed (Anxiety, Nausea/Vomiting or Sleep)                                ondansetron 8 MG tablet   Commonly known as:  ZOFRAN   Take 1 tablet (8 mg) by mouth every 8 hours as needed (Nausea/Vomiting)                                prochlorperazine 10 MG tablet   Commonly known as:  COMPAZINE   Take 1 tablet (10 mg) by mouth every 6 hours as needed (Nausea/Vomiting)

## 2017-08-10 NOTE — PROCEDURES
"Interventional Radiology Brief Post Procedure Note    Procedure: Chest Port Placement    Proceduralist: Malena Andrew PA-C    Assistant: None    Time Out: Prior to the start of the procedure and with procedural staff participation, I verbally confirmed the patient s identity using two indicators, relevant allergies, that the procedure was appropriate and matched the consent or emergent situation, and that the correct equipment/implants were available. Immediately prior to starting the procedure I conducted the Time Out with the procedural staff and re-confirmed the patient s name, procedure, and site/side. (The Joint Commission universal protocol was followed.)  Yes    Sedation: Monitored Anesthesia Care (MAC) administered and documented by Anesthesia Care Provider    Findings: Completed image-guided placement of 8 Serbian 24 cm single lumen power-injectable central venous chest port via right IJ. Aspirates and flushes freely, heparin locked and is ready for immediate use. Port is accessed with 3/4\" needle for appointments tomorrow morning.    Estimated Blood Loss: Minimal    Fluoroscopy Time: Less than 1 minute    SPECIMENS: None    Complications: 1. None     Condition: Stable    Plan: Ready for immediate use. Follow up per primary team. Return for removal as indicated.     Comments: See dictated procedure note for full details.    Malena nAdrew PA-C        "

## 2017-08-10 NOTE — ANESTHESIA CARE TRANSFER NOTE
Patient: Sebas Lopez    Procedure(s):  Single Lumen Right Chest Power Port - Wound Class: I-Clean    Diagnosis: Peritoneal Carcinamatosis  Diagnosis Additional Information: No value filed.    Anesthesia Type:   MAC     Note:  Airway :Room Air  Patient transferred to:Phase II  Comments: Arrive Phase II, Stable, Airway Intact  107/73, 66,16,96,98.1  All questions answered.      Vitals: (Last set prior to Anesthesia Care Transfer)    CRNA VITALS  8/10/2017 1251 - 8/10/2017 1324      8/10/2017             Resp Rate (observed): (!)  2    Resp Rate (set): 10                Electronically Signed By: ALEXA Arroyo CRNA  August 10, 2017  1:24 PM

## 2017-08-10 NOTE — IP AVS SNAPSHOT
[facility]    [address] [place] [id number]    Phone:  [phone]    Fax:  [phone]                                       After Visit Summary   8/10/2017    Sebas Lopez    MRN: [id number]           After Visit Summary Signature Page     I have received my discharge instructions, and my questions have been answered. I have discussed any challenges I see with this plan with the nurse or doctor.    ..........................................................................................................................................  Patient/Patient Representative Signature      ..........................................................................................................................................  Patient Representative Print Name and Relationship to Patient    ..................................................               ................................................  Date                                            Time    ..........................................................................................................................................  Reviewed by Signature/Title    ...................................................              ..............................................  Date                                                            Time

## 2017-08-10 NOTE — ANESTHESIA PREPROCEDURE EVALUATION
Sebas Lopez is a 52 year old male with a PMH of  Peritoneal Carcinamatosis who is scheduled for Procedure(s):  Single Lumen Chest Power Port - Wound Class: I-Clean    NPO Status: Adequate.  > 6 hours solids, > 2 hours clear liquids.       Past Surgical History:   Procedure Laterality Date     COLONOSCOPY  2017     GI SURGERY  07/10/2017    Extensive lysis of adhesions, small bowel resection with end ileostomy.      HERNIA REPAIR       ORTHOPEDIC SURGERY Left     HIP ARTHROPLASTY       Anesthesia Evaluation     . Pt has had prior anesthetic. Type: General    No history of anesthetic complications          ROS/MED HX    ENT/Pulmonary:      (-) tobacco use, asthma and COPD   Neurologic:      (-) CVA, TIA and Neuropathy   Cardiovascular:        (-) hypertension, CAD, irregular heartbeat/palpitations and stent   METS/Exercise Tolerance:     Hematologic:        (-) anemia   Musculoskeletal:         GI/Hepatic:     (+) Other GI/Hepatic      (-) GERD and liver disease   Renal/Genitourinary:      (-) renal disease   Endo:      (-) Type I DM, Type II DM and thyroid disease   Psychiatric:         Infectious Disease:  - neg infectious disease ROS       Malignancy:   (+) Malignancy History of GI          Other:                     Physical Exam  Normal systems: cardiovascular, pulmonary and dental    Airway   Mallampati: I  TM distance: >3 FB  Neck ROM: full    Dental     Cardiovascular   Rhythm and rate: regular and normal      Pulmonary    breath sounds clear to auscultation                    Anesthesia Plan      History & Physical Review  History and physical reviewed and following examination; no interval change.    ASA Status:  2 .        Plan for MAC (with GA backup) with Intravenous induction. Maintenance will be TIVA.  Reason for MAC:  Procedure to face, neck, head or breast  PONV prophylaxis:  Ondansetron       Postoperative Care  Postoperative pain management:  IV analgesics.      Consents  Anesthetic plan,  risks, benefits and alternatives discussed with:  Patient..          Trent Melo MD  12:15 PM August 10, 2017                     .

## 2017-08-11 ENCOUNTER — INFUSION THERAPY VISIT (OUTPATIENT)
Dept: ONCOLOGY | Facility: CLINIC | Age: 52
End: 2017-08-11
Attending: INTERNAL MEDICINE
Payer: COMMERCIAL

## 2017-08-11 ENCOUNTER — APPOINTMENT (OUTPATIENT)
Dept: LAB | Facility: CLINIC | Age: 52
End: 2017-08-11
Attending: INTERNAL MEDICINE
Payer: COMMERCIAL

## 2017-08-11 ENCOUNTER — ONCOLOGY VISIT (OUTPATIENT)
Dept: ONCOLOGY | Facility: CLINIC | Age: 52
End: 2017-08-11
Attending: PHYSICIAN ASSISTANT
Payer: COMMERCIAL

## 2017-08-11 VITALS
WEIGHT: 169.8 LBS | TEMPERATURE: 98.6 F | DIASTOLIC BLOOD PRESSURE: 75 MMHG | HEART RATE: 93 BPM | OXYGEN SATURATION: 99 % | RESPIRATION RATE: 16 BRPM | BODY MASS INDEX: 25.15 KG/M2 | SYSTOLIC BLOOD PRESSURE: 114 MMHG | HEIGHT: 69 IN

## 2017-08-11 DIAGNOSIS — C18.7 CANCER OF SIGMOID COLON (H): ICD-10-CM

## 2017-08-11 DIAGNOSIS — K52.1 DIARRHEA DUE TO DRUG: Primary | ICD-10-CM

## 2017-08-11 DIAGNOSIS — C78.6 PERITONEAL CARCINOMATOSIS (H): Primary | ICD-10-CM

## 2017-08-11 LAB
ALBUMIN SERPL-MCNC: 2.9 G/DL (ref 3.4–5)
ALP SERPL-CCNC: 166 U/L (ref 40–150)
ALT SERPL W P-5'-P-CCNC: 56 U/L (ref 0–70)
ANION GAP SERPL CALCULATED.3IONS-SCNC: 9 MMOL/L (ref 3–14)
AST SERPL W P-5'-P-CCNC: 31 U/L (ref 0–45)
BASOPHILS # BLD AUTO: 0.1 10E9/L (ref 0–0.2)
BASOPHILS NFR BLD AUTO: 0.7 %
BILIRUB SERPL-MCNC: 0.4 MG/DL (ref 0.2–1.3)
BUN SERPL-MCNC: 13 MG/DL (ref 7–30)
CALCIUM SERPL-MCNC: 9.3 MG/DL (ref 8.5–10.1)
CHLORIDE SERPL-SCNC: 103 MMOL/L (ref 94–109)
CO2 SERPL-SCNC: 25 MMOL/L (ref 20–32)
CREAT SERPL-MCNC: 0.94 MG/DL (ref 0.66–1.25)
DIFFERENTIAL METHOD BLD: ABNORMAL
EOSINOPHIL # BLD AUTO: 0.9 10E9/L (ref 0–0.7)
EOSINOPHIL NFR BLD AUTO: 12.1 %
ERYTHROCYTE [DISTWIDTH] IN BLOOD BY AUTOMATED COUNT: 15.3 % (ref 10–15)
GFR SERPL CREATININE-BSD FRML MDRD: 83 ML/MIN/1.7M2
GLUCOSE SERPL-MCNC: 99 MG/DL (ref 70–99)
HCT VFR BLD AUTO: 30.4 % (ref 40–53)
HGB BLD-MCNC: 9.3 G/DL (ref 13.3–17.7)
IMM GRANULOCYTES # BLD: 0 10E9/L (ref 0–0.4)
IMM GRANULOCYTES NFR BLD: 0.4 %
LYMPHOCYTES # BLD AUTO: 1.5 10E9/L (ref 0.8–5.3)
LYMPHOCYTES NFR BLD AUTO: 20 %
MCH RBC QN AUTO: 24 PG (ref 26.5–33)
MCHC RBC AUTO-ENTMCNC: 30.6 G/DL (ref 31.5–36.5)
MCV RBC AUTO: 79 FL (ref 78–100)
MONOCYTES # BLD AUTO: 0.6 10E9/L (ref 0–1.3)
MONOCYTES NFR BLD AUTO: 7.8 %
NEUTROPHILS # BLD AUTO: 4.5 10E9/L (ref 1.6–8.3)
NEUTROPHILS NFR BLD AUTO: 59 %
NRBC # BLD AUTO: 0 10*3/UL
NRBC BLD AUTO-RTO: 0 /100
PLATELET # BLD AUTO: 589 10E9/L (ref 150–450)
POTASSIUM SERPL-SCNC: 4.1 MMOL/L (ref 3.4–5.3)
PROT SERPL-MCNC: 7.8 G/DL (ref 6.8–8.8)
RBC # BLD AUTO: 3.87 10E12/L (ref 4.4–5.9)
SODIUM SERPL-SCNC: 137 MMOL/L (ref 133–144)
WBC # BLD AUTO: 7.6 10E9/L (ref 4–11)

## 2017-08-11 PROCEDURE — 85025 COMPLETE CBC W/AUTO DIFF WBC: CPT | Performed by: INTERNAL MEDICINE

## 2017-08-11 PROCEDURE — 99215 OFFICE O/P EST HI 40 MIN: CPT | Mod: ZP | Performed by: PHYSICIAN ASSISTANT

## 2017-08-11 PROCEDURE — 96368 THER/DIAG CONCURRENT INF: CPT

## 2017-08-11 PROCEDURE — 80053 COMPREHEN METABOLIC PANEL: CPT | Performed by: INTERNAL MEDICINE

## 2017-08-11 PROCEDURE — 25000128 H RX IP 250 OP 636: Mod: ZF | Performed by: PHYSICIAN ASSISTANT

## 2017-08-11 PROCEDURE — 96413 CHEMO IV INFUSION 1 HR: CPT

## 2017-08-11 PROCEDURE — 25000128 H RX IP 250 OP 636: Mod: ZF | Performed by: INTERNAL MEDICINE

## 2017-08-11 PROCEDURE — 96416 CHEMO PROLONG INFUSE W/PUMP: CPT

## 2017-08-11 PROCEDURE — 99212 OFFICE O/P EST SF 10 MIN: CPT | Mod: ZF

## 2017-08-11 PROCEDURE — 96367 TX/PROPH/DG ADDL SEQ IV INF: CPT

## 2017-08-11 PROCEDURE — 96415 CHEMO IV INFUSION ADDL HR: CPT

## 2017-08-11 PROCEDURE — 96411 CHEMO IV PUSH ADDL DRUG: CPT

## 2017-08-11 RX ORDER — HEPARIN SODIUM (PORCINE) LOCK FLUSH IV SOLN 100 UNIT/ML 100 UNIT/ML
5 SOLUTION INTRAVENOUS ONCE
Status: COMPLETED | OUTPATIENT
Start: 2017-08-11 | End: 2017-08-11

## 2017-08-11 RX ORDER — FLUOROURACIL 50 MG/ML
400 INJECTION, SOLUTION INTRAVENOUS ONCE
Status: COMPLETED | OUTPATIENT
Start: 2017-08-11 | End: 2017-08-11

## 2017-08-11 RX ORDER — DIPHENOXYLATE HCL/ATROPINE 2.5-.025MG
1-2 TABLET ORAL 4 TIMES DAILY PRN
Qty: 40 TABLET | Refills: 1 | Status: SHIPPED | OUTPATIENT
Start: 2017-08-11 | End: 2017-10-20

## 2017-08-11 RX ORDER — EPINEPHRINE 0.3 MG/.3ML
INJECTION SUBCUTANEOUS
Status: DISCONTINUED
Start: 2017-08-11 | End: 2017-08-11 | Stop reason: WASHOUT

## 2017-08-11 RX ADMIN — LEUCOVORIN CALCIUM 700 MG: 500 INJECTION, POWDER, LYOPHILIZED, FOR SOLUTION INTRAMUSCULAR; INTRAVENOUS at 08:50

## 2017-08-11 RX ADMIN — OXALIPLATIN 167 MG: 5 INJECTION, SOLUTION, CONCENTRATE INTRAVENOUS at 08:50

## 2017-08-11 RX ADMIN — SODIUM CHLORIDE, PRESERVATIVE FREE 5 ML: 5 INJECTION INTRAVENOUS at 07:12

## 2017-08-11 RX ADMIN — DEXTROSE MONOHYDRATE 250 ML: 50 INJECTION, SOLUTION INTRAVENOUS at 08:29

## 2017-08-11 RX ADMIN — DEXAMETHASONE SODIUM PHOSPHATE: 10 INJECTION, SOLUTION INTRAMUSCULAR; INTRAVENOUS at 08:29

## 2017-08-11 RX ADMIN — FLUOROURACIL 785 MG: 50 INJECTION, SOLUTION INTRAVENOUS at 10:56

## 2017-08-11 ASSESSMENT — PAIN SCALES - GENERAL: PAINLEVEL: NO PAIN (0)

## 2017-08-11 NOTE — NURSING NOTE
"Oncology Rooming Note    August 11, 2017 7:32 AM   Sebas Lopez is a 52 year old male who presents for:    Chief Complaint   Patient presents with     Port Draw     Labs drawn from port. vs taken. checked in for appt     Oncology Clinic Visit     Follow up-Colon CA     Initial Vitals: /75 (BP Location: Right arm, Cuff Size: Adult Regular)  Pulse 93  Temp 98.6  F (37  C) (Oral)  Resp 16  Ht 1.753 m (5' 9\")  Wt 77 kg (169 lb 12.8 oz)  SpO2 99%  BMI 25.08 kg/m2 Estimated body mass index is 25.08 kg/(m^2) as calculated from the following:    Height as of this encounter: 1.753 m (5' 9\").    Weight as of this encounter: 77 kg (169 lb 12.8 oz). Body surface area is 1.94 meters squared.  No Pain (0) Comment: Data Unavailable   No LMP for male patient.  Allergies reviewed: Yes  Medications reviewed: Yes    Medications: Medication refills not needed today.  Pharmacy name entered into [facility]: [facility] - [place] - [place], [place] - [address] [place] [address]    Clinical concerns: No concerns Chiquis Negro was notified.    10 minutes for nursing intake (face to face time)     Tracie Caro LPN              "

## 2017-08-11 NOTE — PROGRESS NOTES
Infusion Nursing Note:  Sebas Lopez presents today for D1 C1 Oxaliplatin, Leucovorin, Fluorouracil push and pump connect.    Patient seen by provider today: Yes: ANNAMARIE Madison    Intravenous Access:  Implanted Port.    Treatment Conditions:  Lab Results   Component Value Date    HGB 9.3 08/11/2017     Lab Results   Component Value Date    WBC 7.6 08/11/2017      Lab Results   Component Value Date    ANEU 4.5 08/11/2017     Lab Results   Component Value Date     08/11/2017      Lab Results   Component Value Date     08/11/2017                   Lab Results   Component Value Date    POTASSIUM 4.1 08/11/2017           No results found for: MAG         Lab Results   Component Value Date    CR 0.94 08/11/2017                   Lab Results   Component Value Date    ANNAMARIE 9.3 08/11/2017                Lab Results   Component Value Date    BILITOTAL 0.4 08/11/2017           Lab Results   Component Value Date    ALBUMIN 2.9 08/11/2017                    Lab Results   Component Value Date    ALT 56 08/11/2017           Lab Results   Component Value Date    AST 31 08/11/2017     Results reviewed, labs MET treatment parameters, ok to proceed with treatment.    Note: Patient is new to the infusion room today (was here on Tuesday for Infed) and is receiving FOLFOX for the first time.  Patient oriented to infusion room, location of bathrooms and nutrition stations, and call light.  Received written chemotherapy information and new patient folder previously.  Verbally reviewed chemotherapy teaching, side effects, take-home medications, and follow-up schedule with patient. Patient instructed to call triage RN line with any questions or if patient experiences a temperature >100.5, shaking chills, uncontrolled nausea/vomiting/diarrhea, dizziness, shortness of breath, bleeding not relieved with pressure, or with any other concerns.  Instructed patient to call after hours triage line after hours. Patient  verbalized understanding.    Fluorouracil Cseries infusion pump connected at 1100.  Positive blood return from port at time of pump hook up. Connections open and taped; verified with Barak Frank RN.  Pump to infuse over 46 hours at 5.2 cc/hour.  Pump will be disconnected on Sunday, 8/13 @ 0900 by [facility]. Confirmed date/time with OLGA LIDIA Cano at [facility].  Patient aware of pump disconnect date and time.    Post Infusion Assessment:  Patient tolerated infusion without incident.  Blood return noted pre and post infusion.  Blood return noted during Fluorouracil administration every 2 cc.  Site patent and intact, free from redness, edema or discomfort.  No evidence of extravasations.    Discharge Plan:   Patient declined prescription refills. Patient already received ativan, compazine, and zofran. Pharmacist completed education and patient demonstrated good understanding of medications. ANNAMARIE Madison gave patient paper prescription of Lomotil.  Discharge instructions reviewed with: Patient.  Patient and/or family verbalized understanding of discharge instructions and all questions answered.  Copy of AVS reviewed with patient and/or family.  Patient will return 8/25/17 for next appointment.  Patient discharged in stable condition accompanied by: self and girlfriend.  Departure Mode: Ambulatory.    Lydia Castro RN

## 2017-08-11 NOTE — PATIENT INSTRUCTIONS
Contact Numbers  [facility] Nurse Triage: [phone]  After Hours Nurse Line:  [phone]    Please call the [facility] Triage line if you experience a temperature greater than or equal to 100.5, shaking chills, have uncontrolled nausea, vomiting and/or diarrhea, dizziness, shortness of breath, chest pain, bleeding, unexplained bruising, or if you have any other new/concerning symptoms, questions or concerns.     If it is after hours, weekends, or holidays, please call either the after hours nurse line listed above.     If you are having any concerning symptoms or wish to speak to a provider before your next infusion visit, please call your care coordinator or triage to notify them so we can adequately serve you.     If you need a refill on a narcotic prescription or other medication, please call triage before your infusion appointment.         August 2017 Sunday Monday Tuesday Wednesday Thursday Friday Saturday             1     2     3     [facility] NEW    3:30 PM   (60 min.)   Albert Long MD   [facility] 4     5       6     7     8     [facility] ONC INFUSION 360    8:00 AM   (360 min.)    ONCOLOGY INFUSION   [facility] 9     10     Outpatient Visit   11:16 AM   [facility]     IR CHEST PORT PLACEMENT >5 YRS   11:30 AM   (60 min.)   [id number]   [facility] ASC Imaging     INSERT PORT VASCULAR ACCESS    1:00 PM   Malena Andrew PA-C    OR 11     [facility] LAB DRAW    7:15 AM   (15 min.)    [facility] LAB DRAW   [facility] Lab Draw     [facility] RETURN    7:35 AM   (50 min.)   Chiquis Negro PA-C   [facility] ONC INFUSION 240    8:00 AM   (240 min.)    ONCOLOGY INFUSION   [facility] 12       13     14     15     16     17     18     19       20     21     22     23     24     25     [facility] DRAW   10:45 AM   (15 min.)    [facility] LAB DRAW   [facility] Lab Draw     [facility]  RETURN   10:55 AM   (50 min.)   Linda Alves PA-C   [facility] ONC INFUSION 240   12:00 PM   (240 min.)    ONCOLOGY INFUSION   [facility] 26 [phone] 31 September 2017 Sunday Monday Tuesday Wednesday Thursday Friday Saturday                            1     2       3     4     5     6     7     8     [facility] LAB DRAW    7:15 AM   (15 min.)    [facility] DRAW   [facility]     [facility] RETURN    7:35 AM   (50 min.)   Linda Alves PA-C   [facility] ONC INFUSION 240    8:30 AM   (240 min.)    ONCOLOGY INFUSION   [facility] 9       10     11     P NEW WITH ROOM    8:45 AM   (75 min.)   Viola Vazuqez GC   [facility] LAB DRAW   10:15 AM   (15 min.)    [facility] LAB DRAW   [facility] Lab Draw 12     13     14     15     16       17     18     19     20     21     22     [facility] LAB DRAW    7:15 AM   (15 min.)    [facility] DRAW   [facility]     [facility] RETURN    7:35 AM   (50 min.)   Linda Alves PA-C   [facility] ONC INFUSION 240    8:30 AM   (240 min.)    ONCOLOGY INFUSION   [facility] 23       24     25     26     27     28     29     30                  Lab Results:  Recent Results (from the past 12 hour(s))   CBC with platelets differential    Collection Time: 08/11/17  7:13 AM   Result Value Ref Range    WBC 7.6 4.0 - 11.0 10e9/L    RBC Count 3.87 (L) 4.4 - 5.9 10e12/L    Hemoglobin 9.3 (L) 13.3 - 17.7 g/dL    Hematocrit 30.4 (L) 40.0 - 53.0 %    MCV 79 78 - 100 fl    MCH 24.0 (L) 26.5 - 33.0 pg    MCHC 30.6 (L) 31.5 - 36.5 g/dL    RDW 15.3 (H) 10.0 - 15.0 %    Platelet Count 589 (H) 150 - 450 10e9/L    Diff Method Automated Method     % Neutrophils 59.0 %    % Lymphocytes 20.0 %    % Monocytes 7.8 %    % Eosinophils  12.1 %    % Basophils 0.7 %    % Immature Granulocytes 0.4 %    Nucleated RBCs 0 0 /100    Absolute Neutrophil 4.5 1.6 - 8.3 10e9/L    Absolute Lymphocytes 1.5 0.8 - 5.3 10e9/L    Absolute Monocytes 0.6 0.0 - 1.3 10e9/L    Absolute Eosinophils 0.9 (H) 0.0 - 0.7 10e9/L    Absolute Basophils 0.1 0.0 - 0.2 10e9/L    Abs Immature Granulocytes 0.0 0 - 0.4 10e9/L    Absolute Nucleated RBC 0.0    Comprehensive metabolic panel    Collection Time: 08/11/17  7:13 AM   Result Value Ref Range    Sodium 137 133 - 144 mmol/L    Potassium 4.1 3.4 - 5.3 mmol/L    Chloride 103 94 - 109 mmol/L    Carbon Dioxide 25 20 - 32 mmol/L    Anion Gap 9 3 - 14 mmol/L    Glucose 99 70 - 99 mg/dL    Urea Nitrogen 13 7 - 30 mg/dL    Creatinine 0.94 0.66 - 1.25 mg/dL    GFR Estimate 83 >60 mL/min/1.7m2    GFR Estimate If Black >90   GFR Calc   >60 mL/min/1.7m2    Calcium 9.3 8.5 - 10.1 mg/dL    Bilirubin Total 0.4 0.2 - 1.3 mg/dL    Albumin 2.9 (L) 3.4 - 5.0 g/dL    Protein Total 7.8 6.8 - 8.8 g/dL    Alkaline Phosphatase 166 (H) 40 - 150 U/L    ALT 56 0 - 70 U/L    AST 31 0 - 45 U/L

## 2017-08-11 NOTE — LETTER
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 Imaging performed at that time showing sigmoid wall thickening with adjacent mesenteric lymphadenopathy with free fluid near the abdominal wall mess from prior hernia surgery. He underwent a colonoscopy that showed an obstructing sigmoid colon mass. Biopsy of the mass showing sigmoid adenocarcinoma. He then developed obstructive symptoms and underwent an exploratory laparotomy on 7/10/2017. During that procedure, he was found to have diffuse peritoneal carcinomatosis. Extensive lysis of adhesions was performed and a small bowel resection was performed with end ileostomy. The biopsies from multiple large peritoneal metastases were also positive for metastatic adenocarcinoma. He is starting FOLFOX (5-FU, oxalipaltin, leucovorin) today.    INTERVAL HISTORY:   Sebas is here with his wife to start FOLFOX.    He is feeling ready to go ahead today. He feels well. He baseline has a high-output ostomy that he manages with Imodium 2 tablets every 4 hours or so at the maximum. He also uses cholestyramine but only really finds this helpful at night. Mostly a hassle during the day due to needing to take it apart from other meds and doesn't find that it makes a difference when he misses a dose. Drinking really well. No fevers, chills, cough, SOB, chest pain, nausea, vomiting, abdominal pain, pain anywhere, constipation, bleeding, swelling, or neuropathy at baseline.         Current Outpatient Prescriptions   Medication Sig Dispense Refill     LORazepam (ATIVAN) 0.5 MG tablet Take 1 tablet (0.5 mg) by mouth every 4 hours as needed (Anxiety, Nausea/Vomiting or Sleep) 30 tablet 2      "prochlorperazine (COMPAZINE) 10 MG tablet Take 1 tablet (10 mg) by mouth every 6 hours as needed (Nausea/Vomiting) 30 tablet 2     ondansetron (ZOFRAN) 8 MG tablet Take 1 tablet (8 mg) by mouth every 8 hours as needed (Nausea/Vomiting) 10 tablet 2     amoxicillin-clavulanate (AUGMENTIN) 875-125 MG per tablet Take 1 tablet by mouth 2 times daily for 7 days 14 tablet 0     cholestyramine (QUESTRAN) 4 G Packet Take 2 packets by mouth 3 times daily (with meals)       loperamide (IMODIUM) 2 MG capsule Take 2 mg by mouth 4 times daily as needed for diarrhea       Ferrous Sulfate (IRON SUPPLEMENT PO)           No Known Allergies    PHYSICAL EXAMINATION  /75 (BP Location: Right arm, Cuff Size: Adult Regular)  Pulse 93  Temp 98.6  F (37  C) (Oral)  Resp 16  Ht 1.753 m (5' 9\")  Wt 77 kg (169 lb 12.8 oz)  SpO2 99%  BMI 25.08 kg/m2  Constitutional: Alert, oriented male in no visible distress.  Eyes: PERRL. Anicteric sclerae.  ENT/Mouth: OM moist and pink without lesions or thrush.  CV: RRR, no murmurs appreciated.  Resp: CTAB throughout  Abdomen: Soft, non-tender, non-distended. Bowel sounds present. No masses appreciated. No organomegaly noted. Ostomy at RLQ draining liquid brown and semi-formed stool  Extremities: No lower extremity edema appreciated.  Skin: Warm, dry. No bruising or petechiae noted.  Lymph: No cervical or supraclavicular lymphadenopathy appreciated.   Neuro: CN II-XII grossly intact.    LABS:  Results for orders placed or performed in visit on 08/10/17 (from the past 24 hour(s))   IR Chest Port Placement > 5 Yrs of Age    Narrative    PRE-PROCEDURE DIAGNOSIS:  Cancer of sigmoid colon; peritoneal  carcinomatosis     POST-PROCEDURE DIAGNOSIS:  Same    PROCEDURE: Chest port placement    Impression    IMPRESSION: Completed image-guided placement of 8 French, 24 cm single  lumen power-injectable central venous port via right internal jugular  vein. Catheter tip in the high right atrium. Aspirates and " flushes  freely, heparin locked and is ready for immediate use. No  complication.    ----------    CLINICAL HISTORY:  Cancer of sigmoid colon; peritoneal carcinomatosis   Chest port placement requested. Pt has infusion appointment tomorrow  morning, request for port to be left accessed today.     PERFORMED BY:  Malena Andrew PA-C    CONSENT:  The patient understood the limitations, alternatives, and  risks of the procedure and agreed to the procedure.  Written informed  consent was obtained and is documented in the patient record.    SEDATION: Monitored anesthesia care    MEDICATIONS: A 10:1 volume mixture of 1% lidocaine without epinephrine  buffered with 8.4% bicarbonate solution was available for local  anesthesia. The port was heparin locked upon completion of placement.    FLUOROSCOPY TIME: .3 minutes    DESCRIPTION:  The right neck and upper chest were prepped and draped  in the usual sterile fashion.      Under ultrasound guidance, the right internal jugular vein was  identified and the overlying skin was anesthetized and skin  dermatotomy was made.  Under ultrasound guidance, right internal  jugular venipuncture was made with needle.  Image saved documenting  venipuncture and patency.    Needle was exchanged over guidewire for a dilator under fluoroscopic  guidance.  Length to right atrium was measured with guidewire.   Guidewire and inner dilator were removed.  Wire was advanced into  inferior vena cava under fluoroscopic guidance and secured.     The anterior right chest skin was anesthetized and incision was made.   A subcutaneous port pocket was created using a combination of sharp  and blunt dissection.  The pocket was irrigated with saline and packed  with gauze to obtain hemostasis.  The gauze was removed.      Port catheter was subcutaneously tunneled from the anterior chest  pocket to the internal jugular venipuncture site after path of tunnel  was anesthetized.  Catheter cut to length. The dilator  was exchanged  over guidewire for a peel-away sheath.  Guidewire was removed.  Under  fluoroscopic guidance, the catheter was placed through the peel-away  sheath and positioned with its tip in the right atrium.  Peel-away  sheath was removed.      Final port and catheter position saved.  Port aspirated and flushed  freely.  The chest incision was closed with three 3-0 Vicryl  interrupted sutures, a running 4-0 Monocryl subcuticular suture, and  topical adhesive.  The skin dermatotomy site overlying the internal  jugular venipuncture was closed with topical adhesive. Port accessed  with 3/4 inch access needle. Aspirated and flushed freely and was  heparin locked before sterile dressing applied.    COMPLICATIONS:  No immediate complication. The patient remained stable  throughout the procedure and tolerated it well.    ESTIMATED BLOOD LOSS:  Minimal    SPECIMENS:  None    MARK HERNANDEZ PA-C         IMPRESSION/PLAN:  Sebas Lopez is a 52 year old male with history of ulcerative colitis, now with sigmoid adenocarcinoma with peritoneal carcinomatosis, status-post ex-lap on 7/10 with extensive lysis of adhesions and small-bowel resection with end ileostomy.    Sigmoid cancer with peritoneal carcinomatosis:  Here to start  FOLFOX. We reviewed the potential side effects and anticipatory side effect management in detail. He was agreeable to start chemotherapy today. He will follow-up with cycle 2 for toxicity assessment.  - Plan to re-image after 4-6 cycles and return to see Dr. Dudley  - He may potentially be able to go on to cytoreductive surgery with [facility] as mentioned in Dr. Dudley's note  - Seeing genetics in September    High-output ostomy: Chronic since surgery. Currently on Imodium up to 8 tablets/daily with Cholestyramine. He doesn't know if the latter actually works during the day but does find it helps at night. Discussed possibility for increased output with this regimen and sent him with an Rx of  Lomotil to fill should the Imodium not be enough. Counseled on symptoms of dehydration and to call with any symptoms of that.    Iron deficiency anemia: secondary to chronic blood loss. Received Infed 8/8. Stable today but will need to monitor.    ID: Completed Augmentin for infected suture, resolved. No infectious issue today. Counseled to call with fevers > 100.4    I spent >40 minutes with the patient, with over 50% of the time spent counseling or coordinating their care as described above.      Chiquis Negro PA-C  Hematology, Oncology, and Transplant  [facility]  [address] [place] [id number] [phone]

## 2017-08-11 NOTE — MR AVS SNAPSHOT
After Visit Summary   8/11/2017    Sebas Lopez    MRN: [id number]           Patient Information     Date Of Birth          1965        Visit Information        Provider Department      8/11/2017 7:50 AM Chiquis Negro PA-C [facility]        Today's Diagnoses     Diarrhea due to drug    -  1       Follow-ups after your visit        Your next 10 appointments already scheduled     Sep 11, 2017  9:00 AM CDT   (Arrive by 8:45 AM)   NEW WITH ROOM with Viola Vazquez GC,  2 114 CONSULT [facility] ([facility])    [address] [place] [address] [phone]            Sep 11, 2017 10:15 AM CDT   [facility] Lab Draw with [facility] LAB DRAW   [facility] Lab Draw ([facility])    [address] [place] [address] [phone]              Who to contact     If you have questions or need follow up information about today's clinic visit or your schedule please contact [facility] directly at [phone].  Normal or non-critical lab and imaging results will be communicated to you by Ayudarumhart, letter or phone within 4 business days after the clinic has received the results. If you do not hear from us within 7 days, please contact the clinic through Ayudarumhart or phone. If you have a critical or abnormal lab result, we will notify you by phone as soon as possible.  Submit refill requests through [employer] or call your pharmacy and they will forward the refill request to us. Please allow 3 business days for your refill to be completed.          Additional Information About Your Visit        MyChart Information     [employer] gives you secure access to your electronic health record. If you see a primary care provider, you can also send messages to your care team and make appointments. If you have questions, please call your primary  "care clinic.  If you do not have a primary care provider, please call [phone] and they will assist you.        Care EveryWhere ID     This is your Care EveryWhere ID. This could be used by other organizations to access your [facility] medical records  [id number]        Your Vitals Were     Pulse Temperature Respirations Height Pulse Oximetry BMI (Body Mass Index)    93 98.6  F (37  C) (Oral) 16 1.753 m (5' 9\") 99% 25.08 kg/m2       Blood Pressure from Last 3 Encounters:   08/11/17 114/75   08/10/17 107/73   08/08/17 129/80    Weight from Last 3 Encounters:   08/11/17 77 kg (169 lb 12.8 oz)   08/10/17 78.8 kg (173 lb 12.8 oz)   08/03/17 78.7 kg (173 lb 8 oz)              Today, you had the following     No orders found for display         Today's Medication Changes          These changes are accurate as of: 8/11/17  8:19 AM.  If you have any questions, ask your nurse or doctor.               Start taking these medicines.        Dose/Directions    diphenoxylate-atropine 2.5-0.025 MG per tablet   Commonly known as:  LOMOTIL   Used for:  Diarrhea due to drug   Started by:  Chiquis Negro PA-C        Dose:  1-2 tablet   Take 1-2 tablets by mouth 4 times daily as needed for diarrhea   Quantity:  40 tablet   Refills:  1            Where to get your medicines      Some of these will need a paper prescription and others can be bought over the counter.  Ask your nurse if you have questions.     Bring a paper prescription for each of these medications     diphenoxylate-atropine 2.5-0.025 MG per tablet                Primary Care Provider Office Phone # Fax #    Waylon Han [phone]       [place] [facility] [address] [facility] [address]        Equal Access to Services     [facility] SHAUN AH: Cristina spiveyo Soshu, waaxda luqadaha, qaybta kaalmada adeegyada, waxay stella willis. So [facility] [phone].    ATENCIÓN: Si tariq werner a cid disposición " servicios gratuitos de asistencia lingüística. Kenan pemberton [phone].    We comply with applicable federal civil rights laws and [place] laws. We do not discriminate on the basis of race, color, national origin, age, disability sex, sexual orientation or gender identity.            Thank you!     Thank you for choosing [facility]  for your care. Our goal is always to provide you with excellent care. Hearing back from our patients is one way we can continue to improve our services. Please take a few minutes to complete the written survey that you may receive in the mail after your visit with us. Thank you!             Your Updated Medication List - Protect others around you: Learn how to safely use, store and throw away your medicines at www.disposemymeds.org.          This list is accurate as of: 8/11/17  8:19 AM.  Always use your most recent med list.                   Brand Name Dispense Instructions for use Diagnosis    cholestyramine 4 G Packet    QUESTRAN     Take 2 packets by mouth 3 times daily (with meals)        diphenoxylate-atropine 2.5-0.025 MG per tablet    LOMOTIL    40 tablet    Take 1-2 tablets by mouth 4 times daily as needed for diarrhea    Diarrhea due to drug       IRON SUPPLEMENT PO           loperamide 2 MG capsule    IMODIUM     Take 2 mg by mouth 4 times daily as needed for diarrhea        LORazepam 0.5 MG tablet    ATIVAN    30 tablet    Take 1 tablet (0.5 mg) by mouth every 4 hours as needed (Anxiety, Nausea/Vomiting or Sleep)    Peritoneal carcinomatosis (H), Cancer of sigmoid colon (H)       ondansetron 8 MG tablet    ZOFRAN    10 tablet    Take 1 tablet (8 mg) by mouth every 8 hours as needed (Nausea/Vomiting)    Peritoneal carcinomatosis (H), Cancer of sigmoid colon (H)       prochlorperazine 10 MG tablet    COMPAZINE    30 tablet    Take 1 tablet (10 mg) by mouth every 6 hours as needed (Nausea/Vomiting)    Peritoneal carcinomatosis (H), Cancer of sigmoid colon  (H)

## 2017-08-11 NOTE — NURSING NOTE
Chief Complaint   Patient presents with     Port Draw     Labs drawn from port. vs taken. checked in for appt     Port already accessed on arrival, labs collected, line flushed with saline and heparin.  Vitals taken. Pt checked in for appointment(s).    Cayla Martell RN

## 2017-08-11 NOTE — MR AVS SNAPSHOT
After Visit Summary   8/11/2017    Sebas Lopez    MRN: [id number]           Patient Information     Date Of Birth          1965        Visit Information        Provider Department      8/11/2017 8:00 AM  15 ATC;  ONCOLOGY INFUSION [facility]        Today's Diagnoses     Peritoneal carcinomatosis (H)    -  1    Cancer of sigmoid colon (H)          Care Instructions    Contact Numbers  [facility] Nurse Triage: [phone]  After Hours Nurse Line:  [phone]    Please call the [facility] Triage line if you experience a temperature greater than or equal to 100.5, shaking chills, have uncontrolled nausea, vomiting and/or diarrhea, dizziness, shortness of breath, chest pain, bleeding, unexplained bruising, or if you have any other new/concerning symptoms, questions or concerns.     If it is after hours, weekends, or holidays, please call either the after hours nurse line listed above.     If you are having any concerning symptoms or wish to speak to a provider before your next infusion visit, please call your care coordinator or triage to notify them so we can adequately serve you.     If you need a refill on a narcotic prescription or other medication, please call triage before your infusion appointment.         August 2017 Sunday Monday Tuesday Wednesday Thursday Friday Saturday             1     2     3     [facility] NEW    3:30 PM   (60 min.)   Albert Long MD   [facility] 4     5       6     7     8     [facility] ONC INFUSION 360    8:00 AM   (360 min.)    ONCOLOGY INFUSION   [facility] 9     10     Outpatient Visit   11:16 AM   [facility]     IR CHEST PORT PLACEMENT >5 YRS   11:30 AM   (60 min.)   [id number]   [facility]     INSERT PORT VASCULAR ACCESS    1:00 PM   Malena Andrew PA-C   [facility]    7:15 AM   (15 min.)   [facility] LAB DRAW   [facility]  Lab Draw     [facility] RETURN    7:35 AM   (50 min.)   Chiquis Negro [facility]-C   [facility]    8:00 AM   (240 min.)    ONCOLOGY INFUSION   [facility] 12       13     14     15     16     17     18     19       20     21     22     23     24     25     [facility] LAB DRAW   10:45 AM   (15 min.)    [facility]     [facility] RETURN   10:55 AM   (50 min.)   Linda Alves PA-C   [facility]   12:00 PM   (240 min.)    ONCOLOGY INFUSION   [facility] 26       27     28     29     30     31 September 2017 Sunday Monday Tuesday Wednesday Thursday Friday Saturday                            1     2       3     4     5     6     7     8     P [facility] LAB DRAW    7:15 AM   (15 min.)    [facility] LAB DRAW   [facility] Lab Draw     [facility] RETURN    7:35 AM   (50 min.)   Linda Alves PA-C   [facility]    8:30 AM   (240 min.)    ONCOLOGY INFUSION   [facility] 9       10     11     [facility] NEW WITH ROOM    8:45 AM   (75 min.)   Viola Vazquez GC   [facility] LAB DRAW   10:15 AM   (15 min.)    [facility] LAB DRAW   [facility] 12     13     14     15     16       17     18     19     20     21     22     [facility] LAB DRAW    7:15 AM   (15 min.)    [facility] LAB DRAW   [facility] Lab Draw     [facility] RETURN    7:35 AM   (50 min.)   Linda Alves PA-C   [facility]    8:30 AM   (240 min.)    ONCOLOGY INFUSION   [facility] 23       24     25     26     27     28     29     30                  Lab Results:  Recent Results (from the past 12 hour(s))   CBC with platelets differential    Collection Time: 08/11/17  7:13 AM   Result Value Ref Range     WBC 7.6 4.0 - 11.0 10e9/L    RBC Count 3.87 (L) 4.4 - 5.9 10e12/L    Hemoglobin 9.3 (L) 13.3 - 17.7 g/dL    Hematocrit 30.4 (L) 40.0 - 53.0 %    MCV 79 78 - 100 fl    MCH 24.0 (L) 26.5 - 33.0 pg    MCHC 30.6 (L) 31.5 - 36.5 g/dL    RDW 15.3 (H) 10.0 - 15.0 %    Platelet Count 589 (H) 150 - 450 10e9/L    Diff Method Automated Method     % Neutrophils 59.0 %    % Lymphocytes 20.0 %    % Monocytes 7.8 %    % Eosinophils 12.1 %    % Basophils 0.7 %    % Immature Granulocytes 0.4 %    Nucleated RBCs 0 0 /100    Absolute Neutrophil 4.5 1.6 - 8.3 10e9/L    Absolute Lymphocytes 1.5 0.8 - 5.3 10e9/L    Absolute Monocytes 0.6 0.0 - 1.3 10e9/L    Absolute Eosinophils 0.9 (H) 0.0 - 0.7 10e9/L    Absolute Basophils 0.1 0.0 - 0.2 10e9/L    Abs Immature Granulocytes 0.0 0 - 0.4 10e9/L    Absolute Nucleated RBC 0.0    Comprehensive metabolic panel    Collection Time: 08/11/17  7:13 AM   Result Value Ref Range    Sodium 137 133 - 144 mmol/L    Potassium 4.1 3.4 - 5.3 mmol/L    Chloride 103 94 - 109 mmol/L    Carbon Dioxide 25 20 - 32 mmol/L    Anion Gap 9 3 - 14 mmol/L    Glucose 99 70 - 99 mg/dL    Urea Nitrogen 13 7 - 30 mg/dL    Creatinine 0.94 0.66 - 1.25 mg/dL    GFR Estimate 83 >60 mL/min/1.7m2    GFR Estimate If Black >90   GFR Calc   >60 mL/min/1.7m2    Calcium 9.3 8.5 - 10.1 mg/dL    Bilirubin Total 0.4 0.2 - 1.3 mg/dL    Albumin 2.9 (L) 3.4 - 5.0 g/dL    Protein Total 7.8 6.8 - 8.8 g/dL    Alkaline Phosphatase 166 (H) 40 - 150 U/L    ALT 56 0 - 70 U/L    AST 31 0 - 45 U/L               Follow-ups after your visit        Your next 10 appointments already scheduled     Aug 25, 2017 10:45 AM CDT   [facility] Lab Draw with  [facility] LAB DRAW   [facility] ([facility]    [address] [place] [address]   [phone]            Aug 25, 2017 11:10 AM CDT   (Arrive by 10:55 AM)   Return Visit with Linda Alves PA-C   [facility] ([facility])    [address] [place] [address]   [phone]            Aug 25, 2017 12:00 PM CDT   Infusion 240 with UC ONCOLOGY INFUSION, UC 15 ATC   [facility] ([facility])    [address] [place] [address]   [phone]            Sep 08, 2017  7:15 AM CDT   [facility] Lab Draw with [facility] LAB DRAW   [facility] Lab Draw ([facility])    [address] [place] [address]   [phone]            Sep 08, 2017  7:50 AM CDT   (Arrive by 7:35 AM)   Return Visit with Linda Alves PA-C   [facility] ([facility])    [address] [place] [address]   [phone]            Sep 08, 2017  8:30 AM CDT   Infusion 240 with UC ONCOLOGY INFUSION, UC 20 ATC   [facility] ([facility])    [address] [place] [address]   [phone]            Sep 11, 2017  9:00 AM CDT   (Arrive by 8:45 AM)   NEW WITH ROOM with Viola Vazquez GC,  2 114 CONSULT [facility] ([facility])    [address] [place] [address]   [phone]              Who to contact     If you have questions or need follow up information about today's clinic visit or your schedule please contact [facility] directly at [phone].  Normal or non-critical lab and imaging results will be communicated to you by MyChart, letter or phone within 4 business days after the clinic has received the results. If you do not hear from us within 7 days, please contact the clinic through MyChart or phone. If you have a critical or abnormal lab result, we will notify you by phone as soon as possible.  Submit refill requests through [employer] or call  your pharmacy and they will forward the refill request to us. Please allow 3 business days for your refill to be completed.          Additional Information About Your Visit        Pain Doctorhart Information     [employer] gives you secure access to your electronic health record. If you see a primary care provider, you can also send messages to your care team and make appointments. If you have questions, please call your primary care clinic.  If you do not have a primary care provider, please call [phone] and they will assist you.        Care EveryWhere ID     This is your Care EveryWhere ID. This could be used by other organizations to access your [facility] medical records  [id number]         Blood Pressure from Last 3 Encounters:   08/11/17 114/75   08/10/17 107/73   08/08/17 129/80    Weight from Last 3 Encounters:   08/11/17 77 kg (169 lb 12.8 oz)   08/10/17 78.8 kg (173 lb 12.8 oz)   08/03/17 78.7 kg (173 lb 8 oz)              We Performed the Following     CBC with platelets differential     Comprehensive metabolic panel          Today's Medication Changes          These changes are accurate as of: 8/11/17 10:30 AM.  If you have any questions, ask your nurse or doctor.               Start taking these medicines.        Dose/Directions    diphenoxylate-atropine 2.5-0.025 MG per tablet   Commonly known as:  LOMOTIL   Used for:  Diarrhea due to drug   Started by:  Chiquis Negro PA-C        Dose:  1-2 tablet   Take 1-2 tablets by mouth 4 times daily as needed for diarrhea   Quantity:  40 tablet   Refills:  1            Where to get your medicines      Some of these will need a paper prescription and others can be bought over the counter.  Ask your nurse if you have questions.     Bring a paper prescription for each of these medications     diphenoxylate-atropine 2.5-0.025 MG per tablet                Primary Care Provider Office Phone # Fax #    Waylon Han [phone]       [place]  [facility] [address]  [facility] [address]        Equal Access to Services     SARAH DUNN : Hadii karrie reddy particevens Maureenshu, wahayderda marydaniaha, qajoeta opalmaddyrita saucedo, bhargav blancozullyvijay willis. So [facility] [phone].    ATENCIÓN: Si habla español, tiene a cid disposición servicios gratuitos de asistencia lingüística. Llame al [phone].    We comply with applicable federal civil rights laws and [place] laws. We do not discriminate on the basis of race, color, national origin, age, disability sex, sexual orientation or gender identity.            Thank you!     Thank you for choosing [facility]  for your care. Our goal is always to provide you with excellent care. Hearing back from our patients is one way we can continue to improve our services. Please take a few minutes to complete the written survey that you may receive in the mail after your visit with us. Thank you!             Your Updated Medication List - Protect others around you: Learn how to safely use, store and throw away your medicines at www.disposemymeds.org.          This list is accurate as of: 8/11/17 10:30 AM.  Always use your most recent med list.                   Brand Name Dispense Instructions for use Diagnosis    cholestyramine 4 G Packet    QUESTRAN     Take 2 packets by mouth 3 times daily (with meals)        diphenoxylate-atropine 2.5-0.025 MG per tablet    LOMOTIL    40 tablet    Take 1-2 tablets by mouth 4 times daily as needed for diarrhea    Diarrhea due to drug       IRON SUPPLEMENT PO           loperamide 2 MG capsule    IMODIUM     Take 2 mg by mouth 4 times daily as needed for diarrhea        LORazepam 0.5 MG tablet    ATIVAN    30 tablet    Take 1 tablet (0.5 mg) by mouth every 4 hours as needed (Anxiety, Nausea/Vomiting or Sleep)    Peritoneal carcinomatosis (H), Cancer of sigmoid colon (H)       ondansetron 8 MG tablet    ZOFRAN    10 tablet    Take 1 tablet (8 mg) by mouth every 8  hours as needed (Nausea/Vomiting)    Peritoneal carcinomatosis (H), Cancer of sigmoid colon (H)       prochlorperazine 10 MG tablet    COMPAZINE    30 tablet    Take 1 tablet (10 mg) by mouth every 6 hours as needed (Nausea/Vomiting)    Peritoneal carcinomatosis (H), Cancer of sigmoid colon (H)

## 2017-08-25 ENCOUNTER — ONCOLOGY VISIT (OUTPATIENT)
Dept: ONCOLOGY | Facility: CLINIC | Age: 52
End: 2017-08-25
Attending: INTERNAL MEDICINE
Payer: COMMERCIAL

## 2017-08-25 ENCOUNTER — APPOINTMENT (OUTPATIENT)
Dept: LAB | Facility: CLINIC | Age: 52
End: 2017-08-25
Attending: INTERNAL MEDICINE
Payer: COMMERCIAL

## 2017-08-25 VITALS
BODY MASS INDEX: 24.03 KG/M2 | OXYGEN SATURATION: 100 % | HEIGHT: 69 IN | SYSTOLIC BLOOD PRESSURE: 120 MMHG | TEMPERATURE: 99.9 F | DIASTOLIC BLOOD PRESSURE: 75 MMHG | RESPIRATION RATE: 16 BRPM | WEIGHT: 162.26 LBS | HEART RATE: 89 BPM

## 2017-08-25 DIAGNOSIS — C78.6 PERITONEAL CARCINOMATOSIS (H): ICD-10-CM

## 2017-08-25 DIAGNOSIS — C18.7 CANCER OF SIGMOID COLON (H): Primary | ICD-10-CM

## 2017-08-25 DIAGNOSIS — R19.7 DIARRHEA, UNSPECIFIED TYPE: ICD-10-CM

## 2017-08-25 DIAGNOSIS — C18.7 CANCER OF SIGMOID COLON (H): ICD-10-CM

## 2017-08-25 DIAGNOSIS — C78.6 PERITONEAL CARCINOMATOSIS (H): Primary | ICD-10-CM

## 2017-08-25 LAB
ALBUMIN SERPL-MCNC: 3.1 G/DL (ref 3.4–5)
ALP SERPL-CCNC: 149 U/L (ref 40–150)
ALT SERPL W P-5'-P-CCNC: 67 U/L (ref 0–70)
ANION GAP SERPL CALCULATED.3IONS-SCNC: 9 MMOL/L (ref 3–14)
AST SERPL W P-5'-P-CCNC: 27 U/L (ref 0–45)
BASOPHILS # BLD AUTO: 0 10E9/L (ref 0–0.2)
BASOPHILS NFR BLD AUTO: 0.6 %
BILIRUB SERPL-MCNC: 0.3 MG/DL (ref 0.2–1.3)
BUN SERPL-MCNC: 15 MG/DL (ref 7–30)
CALCIUM SERPL-MCNC: 8.9 MG/DL (ref 8.5–10.1)
CHLORIDE SERPL-SCNC: 96 MMOL/L (ref 94–109)
CO2 SERPL-SCNC: 31 MMOL/L (ref 20–32)
CREAT SERPL-MCNC: 0.86 MG/DL (ref 0.66–1.25)
DIFFERENTIAL METHOD BLD: ABNORMAL
EOSINOPHIL # BLD AUTO: 0.2 10E9/L (ref 0–0.7)
EOSINOPHIL NFR BLD AUTO: 3.5 %
ERYTHROCYTE [DISTWIDTH] IN BLOOD BY AUTOMATED COUNT: 19 % (ref 10–15)
GFR SERPL CREATININE-BSD FRML MDRD: >90 ML/MIN/1.7M2
GLUCOSE SERPL-MCNC: 95 MG/DL (ref 70–99)
HCT VFR BLD AUTO: 29.9 % (ref 40–53)
HGB BLD-MCNC: 9.4 G/DL (ref 13.3–17.7)
IMM GRANULOCYTES # BLD: 0 10E9/L (ref 0–0.4)
IMM GRANULOCYTES NFR BLD: 0.1 %
LYMPHOCYTES # BLD AUTO: 1.6 10E9/L (ref 0.8–5.3)
LYMPHOCYTES NFR BLD AUTO: 23.2 %
MCH RBC QN AUTO: 24.4 PG (ref 26.5–33)
MCHC RBC AUTO-ENTMCNC: 31.4 G/DL (ref 31.5–36.5)
MCV RBC AUTO: 78 FL (ref 78–100)
MONOCYTES # BLD AUTO: 1.2 10E9/L (ref 0–1.3)
MONOCYTES NFR BLD AUTO: 17.8 %
NEUTROPHILS # BLD AUTO: 3.7 10E9/L (ref 1.6–8.3)
NEUTROPHILS NFR BLD AUTO: 54.8 %
NRBC # BLD AUTO: 0 10*3/UL
NRBC BLD AUTO-RTO: 0 /100
PLATELET # BLD AUTO: 325 10E9/L (ref 150–450)
POTASSIUM SERPL-SCNC: 3 MMOL/L (ref 3.4–5.3)
PROT SERPL-MCNC: 7.9 G/DL (ref 6.8–8.8)
RBC # BLD AUTO: 3.86 10E12/L (ref 4.4–5.9)
SODIUM SERPL-SCNC: 136 MMOL/L (ref 133–144)
WBC # BLD AUTO: 6.8 10E9/L (ref 4–11)

## 2017-08-25 PROCEDURE — 96413 CHEMO IV INFUSION 1 HR: CPT

## 2017-08-25 PROCEDURE — 96415 CHEMO IV INFUSION ADDL HR: CPT

## 2017-08-25 PROCEDURE — 85025 COMPLETE CBC W/AUTO DIFF WBC: CPT | Performed by: INTERNAL MEDICINE

## 2017-08-25 PROCEDURE — 80053 COMPREHEN METABOLIC PANEL: CPT | Performed by: INTERNAL MEDICINE

## 2017-08-25 PROCEDURE — 25000128 H RX IP 250 OP 636: Mod: ZF | Performed by: PHYSICIAN ASSISTANT

## 2017-08-25 PROCEDURE — 99213 OFFICE O/P EST LOW 20 MIN: CPT | Mod: ZF

## 2017-08-25 PROCEDURE — 96367 TX/PROPH/DG ADDL SEQ IV INF: CPT

## 2017-08-25 PROCEDURE — 96411 CHEMO IV PUSH ADDL DRUG: CPT

## 2017-08-25 PROCEDURE — 96416 CHEMO PROLONG INFUSE W/PUMP: CPT

## 2017-08-25 PROCEDURE — 96368 THER/DIAG CONCURRENT INF: CPT

## 2017-08-25 PROCEDURE — 99214 OFFICE O/P EST MOD 30 MIN: CPT | Mod: ZP | Performed by: PHYSICIAN ASSISTANT

## 2017-08-25 RX ORDER — EPINEPHRINE 0.3 MG/.3ML
0.3 INJECTION SUBCUTANEOUS EVERY 5 MIN PRN
Status: CANCELLED | OUTPATIENT
Start: 2017-08-25

## 2017-08-25 RX ORDER — MEPERIDINE HYDROCHLORIDE 25 MG/ML
25 INJECTION INTRAMUSCULAR; INTRAVENOUS; SUBCUTANEOUS EVERY 30 MIN PRN
Status: CANCELLED | OUTPATIENT
Start: 2017-08-25

## 2017-08-25 RX ORDER — METHYLPREDNISOLONE SODIUM SUCCINATE 125 MG/2ML
125 INJECTION, POWDER, LYOPHILIZED, FOR SOLUTION INTRAMUSCULAR; INTRAVENOUS
Status: CANCELLED
Start: 2017-08-25

## 2017-08-25 RX ORDER — ALBUTEROL SULFATE 0.83 MG/ML
2.5 SOLUTION RESPIRATORY (INHALATION)
Status: CANCELLED | OUTPATIENT
Start: 2017-08-25

## 2017-08-25 RX ORDER — HEPARIN SODIUM (PORCINE) LOCK FLUSH IV SOLN 100 UNIT/ML 100 UNIT/ML
5 SOLUTION INTRAVENOUS EVERY 8 HOURS
Status: DISCONTINUED | OUTPATIENT
Start: 2017-08-25 | End: 2017-08-25 | Stop reason: HOSPADM

## 2017-08-25 RX ORDER — LORAZEPAM 2 MG/ML
0.5 INJECTION INTRAMUSCULAR EVERY 4 HOURS PRN
Status: CANCELLED
Start: 2017-08-25

## 2017-08-25 RX ORDER — EPINEPHRINE 1 MG/ML
0.3 INJECTION INTRAMUSCULAR; INTRAVENOUS; SUBCUTANEOUS EVERY 5 MIN PRN
Status: CANCELLED | OUTPATIENT
Start: 2017-08-25

## 2017-08-25 RX ORDER — ALBUTEROL SULFATE 90 UG/1
1-2 AEROSOL, METERED RESPIRATORY (INHALATION)
Status: CANCELLED
Start: 2017-08-25

## 2017-08-25 RX ORDER — FLUOROURACIL 50 MG/ML
400 INJECTION, SOLUTION INTRAVENOUS ONCE
Status: COMPLETED | OUTPATIENT
Start: 2017-08-25 | End: 2017-08-25

## 2017-08-25 RX ORDER — SODIUM CHLORIDE 9 MG/ML
1000 INJECTION, SOLUTION INTRAVENOUS CONTINUOUS PRN
Status: CANCELLED
Start: 2017-08-25

## 2017-08-25 RX ORDER — FLUOROURACIL 50 MG/ML
400 INJECTION, SOLUTION INTRAVENOUS ONCE
Status: CANCELLED | OUTPATIENT
Start: 2017-08-25

## 2017-08-25 RX ORDER — DIPHENHYDRAMINE HYDROCHLORIDE 50 MG/ML
50 INJECTION INTRAMUSCULAR; INTRAVENOUS
Status: CANCELLED
Start: 2017-08-25

## 2017-08-25 RX ADMIN — DEXAMETHASONE SODIUM PHOSPHATE: 10 INJECTION, SOLUTION INTRAMUSCULAR; INTRAVENOUS at 13:35

## 2017-08-25 RX ADMIN — SODIUM CHLORIDE, PRESERVATIVE FREE 5 ML: 5 INJECTION INTRAVENOUS at 10:57

## 2017-08-25 RX ADMIN — FLUOROURACIL 785 MG: 50 INJECTION, SOLUTION INTRAVENOUS at 17:11

## 2017-08-25 RX ADMIN — LEUCOVORIN CALCIUM 700 MG: 350 INJECTION, POWDER, LYOPHILIZED, FOR SOLUTION INTRAMUSCULAR; INTRAVENOUS at 14:55

## 2017-08-25 RX ADMIN — DEXTROSE MONOHYDRATE 250 ML: 50 INJECTION, SOLUTION INTRAVENOUS at 14:52

## 2017-08-25 RX ADMIN — OXALIPLATIN 167 MG: 5 INJECTION, SOLUTION, CONCENTRATE INTRAVENOUS at 14:58

## 2017-08-25 RX ADMIN — POTASSIUM CHLORIDE: 149 INJECTION, SOLUTION, CONCENTRATE INTRAVENOUS at 12:22

## 2017-08-25 ASSESSMENT — PAIN SCALES - GENERAL: PAINLEVEL: NO PAIN (0)

## 2017-08-25 NOTE — MR AVS SNAPSHOT
After Visit Summary   8/25/2017    Sebas Lopez    MRN: [id number]           Patient Information     Date Of Birth          1965        Visit Information        Provider Department      8/25/2017 12:00 PM  15 ATC;  ONCOLOGY INFUSION [facility]        Today's Diagnoses     Peritoneal carcinomatosis (H)    -  1    Cancer of sigmoid colon (H)          Care Instructions    Contact Numbers  [facility]: [phone]    After Hours:  [phone]  Triage: [phone]    Please call the [facility] Triage line if you experience a temperature greater than or equal to 100.5, shaking chills, have uncontrolled nausea, vomiting and/or diarrhea, dizziness, shortness of breath, chest pain, bleeding, unexplained bruising, or if you have any other new/concerning symptoms, questions or concerns.     If it is after hours, weekends, or holidays, please call the main hospital  at  [phone] and ask to speak to the Oncology doctor on call.     If you are having any concerning symptoms or wish to speak to a provider before your next infusion visit, please call your care coordinator or triage to notify them so we can adequately serve you.     If you need a refill on a narcotic prescription or other medication, please call triage before your infusion appointment.         August 2017 Sunday Monday Tuesday Wednesday Thursday Friday Saturday             1     2     3     UMP NEW    3:30 PM   (60 min.)   Albert Long MD   [facility] 4     5       6     7     8     [facility] ONC INFUSION 360    8:00 AM   (360 min.)    ONCOLOGY INFUSION   [facility] 9     10     Outpatient Visit   11:16 AM   [facility]     IR CHEST PORT PLACEMENT >5 YRS   11:30 AM   (60 min.)   [id number]   [facility] ASC Imaging     INSERT PORT VASCULAR ACCESS    1:00 PM   Malena Andrew PA-C    OR [address] [place] [address] LAB DRAW    7:15 AM   (15  min.)    [facility] LAB DRAW   [facility] Lab Draw     UMP RETURN    7:35 AM   (50 min.)   Chiquis Negro PA-C   [facility]     UMP ONC INFUSION 240    8:00 AM   (240 min.)    ONCOLOGY INFUSION   [facility] 12       13     14     15     16     17     18     19       20     21     22     23     24     25     [facility] LAB DRAW   10:45 AM   (15 min.)   UC [facility] LAB DRAW   [facility] Lab Draw     UMP RETURN   10:55 AM   (50 min.)   Linda Alves PA-C   [facility] 240   12:00 PM   (240 min.)    ONCOLOGY INFUSION   [facility] 26       27     28     29     30     31 September 2017 Sunday Monday Tuesday Wednesday Thursday Friday Saturday                            1     2       3     4     5     6     7     8     P [facility] LAB DRAW    7:15 AM   (15 min.)    [facility]     [facility] RETURN    7:35 AM   (50 min.)   Linda Alves PA-C   [facility] 240    8:30 AM   (240 min.)    ONCOLOGY INFUSION   [facility] 9       10     11     UMP NEW WITH ROOM    8:45 AM   (75 min.)   Viola Vazquez GC   [facility] DRAW   10:15 AM   (15 min.)    [facility] LAB DRAW   [facility] Lab Draw 12     13     14     15     16       17     18     19     20     21     22     [facility] LAB DRAW    7:15 AM   (15 min.)    [facility] LAB DRAW   [facility] Lab Draw     UMP RETURN    7:35 AM   (50 min.)   Linda Alves PA-C   [facility]     UMP ONC INFUSION 240    8:30 AM   (240 min.)    ONCOLOGY INFUSION   [facility] 23       24     25     26     27     28     29     30                  Lab Results:  Recent Results (from the past 12 hour(s))   CBC with platelets differential    Collection  Time: 08/25/17 11:06 AM   Result Value Ref Range    WBC 6.8 4.0 - 11.0 10e9/L    RBC Count 3.86 (L) 4.4 - 5.9 10e12/L    Hemoglobin 9.4 (L) 13.3 - 17.7 g/dL    Hematocrit 29.9 (L) 40.0 - 53.0 %    MCV 78 78 - 100 fl    MCH 24.4 (L) 26.5 - 33.0 pg    MCHC 31.4 (L) 31.5 - 36.5 g/dL    RDW 19.0 (H) 10.0 - 15.0 %    Platelet Count 325 150 - 450 10e9/L    Diff Method Automated Method     % Neutrophils 54.8 %    % Lymphocytes 23.2 %    % Monocytes 17.8 %    % Eosinophils 3.5 %    % Basophils 0.6 %    % Immature Granulocytes 0.1 %    Nucleated RBCs 0 0 /100    Absolute Neutrophil 3.7 1.6 - 8.3 10e9/L    Absolute Lymphocytes 1.6 0.8 - 5.3 10e9/L    Absolute Monocytes 1.2 0.0 - 1.3 10e9/L    Absolute Eosinophils 0.2 0.0 - 0.7 10e9/L    Absolute Basophils 0.0 0.0 - 0.2 10e9/L    Abs Immature Granulocytes 0.0 0 - 0.4 10e9/L    Absolute Nucleated RBC 0.0    Comprehensive metabolic panel    Collection Time: 08/25/17 11:06 AM   Result Value Ref Range    Sodium 136 133 - 144 mmol/L    Potassium 3.0 (L) 3.4 - 5.3 mmol/L    Chloride 96 94 - 109 mmol/L    Carbon Dioxide 31 20 - 32 mmol/L    Anion Gap 9 3 - 14 mmol/L    Glucose 95 70 - 99 mg/dL    Urea Nitrogen 15 7 - 30 mg/dL    Creatinine 0.86 0.66 - 1.25 mg/dL    GFR Estimate >90 >60 mL/min/1.7m2    GFR Estimate If Black >90 >60 mL/min/1.7m2    Calcium 8.9 8.5 - 10.1 mg/dL    Bilirubin Total 0.3 0.2 - 1.3 mg/dL    Albumin 3.1 (L) 3.4 - 5.0 g/dL    Protein Total 7.9 6.8 - 8.8 g/dL    Alkaline Phosphatase 149 40 - 150 U/L    ALT 67 0 - 70 U/L    AST 27 0 - 45 U/L               Follow-ups after your visit        Your next 10 appointments already scheduled     Sep 08, 2017  7:15 AM CDT   [facility] Lab Draw with  [facility] LAB DRAW   [facility] Lab Draw ([facility])    [address] [place] [address]   [phone]            Sep 08, 2017  7:50 AM CDT   (Arrive by 7:35 AM)   Return Visit with Linda Alves PA-C   [facility] ([facility])    [address]  [facility] [address]   [phone]            Sep 08, 2017  8:30 AM CDT   Infusion 240 with UC ONCOLOGY INFUSION, UC 20 ATC   [facility] ([facility])    [address] [place] [address]  [address] [facility] [address]   [phone]            Sep 11, 2017  9:00 AM CDT   (Arrive by 8:45 AM)   NEW WITH ROOM with Viola Vazquez GC,  2 114 CONSULT RM   [facility] ([facility])    [address] [place] [address]   [phone]            Sep 11, 2017 10:15 AM CDT   [facility] Lab Draw with  MASONIC LAB DRAW   [facility]    [address] [place] [address]   [phone]            Sep 22, 2017  7:15 AM CDT   [facility] Lab Draw with  MASONIC LAB DRAW   [facility]    [address] [place] [address]   [phone]            Sep 22, 2017  7:50 AM CDT   (Arrive by 7:35 AM)   Return Visit with Linda Alves PA-C   [facility] ([facility])    [address] [place] [address]   [phone]            Sep 22, 2017  8:30 AM CDT   Infusion 240 with UC ONCOLOGY INFUSION   [facility] ([facility])    [address] [place] [address]   [phone]              Who to contact     If you have questions or need follow up information about today's clinic visit or your schedule please contact [facility] directly at [phone].  Normal or non-critical lab and imaging results will be communicated to you by MyChart, letter or phone within 4 business days after the clinic has received the results. If you do  not hear from us within 7 days, please contact the clinic through [employer] or phone. If you have a critical or abnormal lab result, we will notify you by phone as soon as possible.  Submit refill requests through [employer] or call your pharmacy and they will forward the refill request to us. Please allow 3 business days for your refill to be completed.          Additional Information About Your Visit        [employer] Information     [employer] gives you secure access to your electronic health record. If you see a primary care provider, you can also send messages to your care team and make appointments. If you have questions, please call your primary care clinic.  If you do not have a primary care provider, please call [phone] and they will assist you.        Care EveryWhere ID     This is your Care EveryWhere ID. This could be used by other organizations to access your [place] medical records  [id number]         Blood Pressure from Last 3 Encounters:   08/25/17 120/75   08/11/17 114/75   08/10/17 107/73    Weight from Last 3 Encounters:   08/25/17 73.6 kg (162 lb 4.1 oz)   08/11/17 77 kg (169 lb 12.8 oz)   08/10/17 78.8 kg (173 lb 12.8 oz)              We Performed the Following     CBC with platelets differential     Comprehensive metabolic panel          Today's Medication Changes          These changes are accurate as of: 8/25/17  1:28 PM.  If you have any questions, ask your nurse or doctor.               Start taking these medicines.        Dose/Directions    opium tincture tincture   Used for:  Diarrhea, unspecified type   Started by:  Linda Alves PA-C        Dose:  6 mg   Take 0.6 mLs (6 mg) by mouth 4 times daily   Quantity:  72 mL   Refills:  0            Where to get your medicines      Some of these will need a paper prescription and others can be bought over the counter.  Ask your nurse if you have questions.     Bring a paper prescription for each of these medications     opium tincture  tincture                Primary Care Provider Office Phone # Fax #    Waylon Han [phone]       [facility] [address]  [facility] [address]        Equal Access to Services     SARAH DUNN : Cristina reddy hadbrandono Soomaali, waaxda luqadaha, qaybta kaalmada adeegyada, bhargav lawrence laNelamino willis. So [facility] [phone].    ATENCIÓN: Si habla español, tiene a cid disposición servicios gratuitos de asistencia lingüística. Llame al [phone].    We comply with applicable federal civil rights laws and [place] laws. We do not discriminate on the basis of race, color, national origin, age, disability sex, sexual orientation or gender identity.            Thank you!     Thank you for choosing [facility]  for your care. Our goal is always to provide you with excellent care. Hearing back from our patients is one way we can continue to improve our services. Please take a few minutes to complete the written survey that you may receive in the mail after your visit with us. Thank you!             Your Updated Medication List - Protect others around you: Learn how to safely use, store and throw away your medicines at www.disposemymeds.org.          This list is accurate as of: 8/25/17  1:28 PM.  Always use your most recent med list.                   Brand Name Dispense Instructions for use Diagnosis    cholestyramine 4 G Packet    QUESTRAN     Take 2 packets by mouth 3 times daily (with meals)        diphenoxylate-atropine 2.5-0.025 MG per tablet    LOMOTIL    40 tablet    Take 1-2 tablets by mouth 4 times daily as needed for diarrhea    Diarrhea due to drug       IRON SUPPLEMENT PO           loperamide 2 MG capsule    IMODIUM     Take 2 mg by mouth 4 times daily as needed for diarrhea        LORazepam 0.5 MG tablet    ATIVAN    30 tablet    Take 1 tablet (0.5 mg) by mouth every 4 hours as needed (Anxiety, Nausea/Vomiting or Sleep)    Peritoneal carcinomatosis  (H), Cancer of sigmoid colon (H)       ondansetron 8 MG tablet    ZOFRAN    10 tablet    Take 1 tablet (8 mg) by mouth every 8 hours as needed (Nausea/Vomiting)    Peritoneal carcinomatosis (H), Cancer of sigmoid colon (H)       opium tincture tincture     72 mL    Take 0.6 mLs (6 mg) by mouth 4 times daily    Diarrhea, unspecified type       prochlorperazine 10 MG tablet    COMPAZINE    30 tablet    Take 1 tablet (10 mg) by mouth every 6 hours as needed (Nausea/Vomiting)    Peritoneal carcinomatosis (H), Cancer of sigmoid colon (H)

## 2017-08-25 NOTE — PROGRESS NOTES
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 Imaging performed at that time showing sigmoid wall thickening with adjacent mesenteric lymphadenopathy with free fluid near the abdominal wall mess from prior hernia surgery. He underwent a colonoscopy that showed an obstructing sigmoid colon mass. Biopsy of the mass showing sigmoid adenocarcinoma. He then developed obstructive symptoms and underwent an exploratory laparotomy on 7/10/2017. During that procedure, he was found to have diffuse peritoneal carcinomatosis. Extensive lysis of adhesions was performed and a small bowel resection was performed with end ileostomy. The biopsies from multiple large peritoneal metastases were also positive for metastatic adenocarcinoma. He started FOLFOX (5-FU, oxalipaltin, leucovorin) on 8/11/17. He comes in today for routine follow up prior to cycle 2.     INTERVAL HISTORY:   Patient denies any nausea with the chemotherapy. He feels that his appetite is okay. He initially tried a low carb diet, but felt he was losing too much weight, so stopped that. He is taking in occasional Boost or Ensure. He reports that his ostomy is high output. He is taking 2 Imodium qid. He did not find a benefit from Lomotil, so stopped it after 2 doses. He has also not been taking his Questran lately. He is occasionally up at night to empty his ostomy. He typically empties it about every 2 hours during the day. He is drinking at least 2 quarts Gatorade per day and 1 quart of other fluids per day. He reports cold sensitivity for 3 days after the oxaliplatin. He denies any numbness or tingling separate from the cold. He had 2 sores on his lower lip that are healing. He denies any hand foot syndrome. He has been taking Ativan to help him  "sleep and also occasionally takes it during the day for nausea. He denies other concerns.     Current Outpatient Prescriptions   Medication Sig Dispense Refill     opium tincture tincture Take 0.6 mLs (6 mg) by mouth 4 times daily 72 mL 0     diphenoxylate-atropine (LOMOTIL) 2.5-0.025 MG per tablet Take 1-2 tablets by mouth 4 times daily as needed for diarrhea 40 tablet 1     LORazepam (ATIVAN) 0.5 MG tablet Take 1 tablet (0.5 mg) by mouth every 4 hours as needed (Anxiety, Nausea/Vomiting or Sleep) 30 tablet 2     prochlorperazine (COMPAZINE) 10 MG tablet Take 1 tablet (10 mg) by mouth every 6 hours as needed (Nausea/Vomiting) 30 tablet 2     ondansetron (ZOFRAN) 8 MG tablet Take 1 tablet (8 mg) by mouth every 8 hours as needed (Nausea/Vomiting) 10 tablet 2     cholestyramine (QUESTRAN) 4 G Packet Take 2 packets by mouth 3 times daily (with meals)       loperamide (IMODIUM) 2 MG capsule Take 2 mg by mouth 4 times daily as needed for diarrhea       Ferrous Sulfate (IRON SUPPLEMENT PO)           No Known Allergies    PHYSICAL EXAMINATION  /75  Pulse 89  Temp 99.9  F (37.7  C) (Oral)  Resp 16  Ht 1.753 m (5' 9.02\")  Wt 73.6 kg (162 lb 4.1 oz)  SpO2 100%  BMI 23.95 kg/m2  Constitutional: Alert, oriented male in no visible distress.  Eyes: PERRL. Anicteric sclerae.  ENT/Mouth: OM moist and pink without lesions or thrush.  CV: RRR.  Resp: CTAB throughout  Abdomen: Soft, non-tender, non-distended. Bowel sounds present. No masses appreciated. No organomegaly noted. Ostomy at RLQ draining liquid brown stool  Extremities: No lower extremity edema appreciated.  Skin: Warm, dry. No bruising or petechiae noted.  Lymph: No cervical or supraclavicular lymphadenopathy appreciated.   Neuro: CN II-XII grossly intact.    LABS:   8/25/2017 11:06   Sodium 136   Potassium 3.0 (L)   Chloride 96   Carbon Dioxide 31   Urea Nitrogen 15   Creatinine 0.86   GFR Estimate >90   GFR Estimate If Black >90   Calcium 8.9   Anion Gap 9 "   Albumin 3.1 (L)   Protein Total 7.9   Bilirubin Total 0.3   Alkaline Phosphatase 149   ALT 67   AST 27   Glucose 95   WBC 6.8   Hemoglobin 9.4 (L)   Hematocrit 29.9 (L)   Platelet Count 325   RBC Count 3.86 (L)   MCV 78   MCH 24.4 (L)   MCHC 31.4 (L)   RDW 19.0 (H)   Diff Method Automated Method   % Neutrophils 54.8   % Lymphocytes 23.2   % Monocytes 17.8   % Eosinophils 3.5   % Basophils 0.6   % Immature Granulocytes 0.1   Nucleated RBCs 0   Absolute Neutrophil 3.7   Absolute Lymphocytes 1.6   Absolute Monocytes 1.2   Absolute Eosinophils 0.2   Absolute Basophils 0.0   Abs Immature Granulocytes 0.0   Absolute Nucleated RBC 0.0     IMPRESSION/PLAN:  Sebas Lopez is a 52 year old male with history of ulcerative colitis, now with sigmoid adenocarcinoma with peritoneal carcinomatosis, status-post ex-lap on 7/10 with extensive lysis of adhesions and small-bowel resection with end ileostomy.    Sigmoid cancer with peritoneal carcinomatosis:  He started on FOLFOX on 8/11/17. He appears to have tolerated the first cycle well with some mild cold sensitivity that has since resolved. He will start cycle 2 today. He will follow up in clinic every 2 weeks prior to each cycle.   - Plan to re-image after 4-6 cycles and return to see Dr. Dudley  - He may potentially be able to go on to cytoreductive surgery with [facility] as mentioned in Dr. Dudley's note  - Seeing genetics in September    High-output ostomy: Chronic since surgery. Currently on Imodium up to 8 tablets/daily. He did not find a benefit from Lomotil. Will try adding in tincture of opium 6 mg qid scheduled. He may back off on this if he develops constipation. He will continue to push fluids orally. He also has cholestyramine available at home.     Iron deficiency anemia: secondary to chronic blood loss. Received Infed 8/8. Stable today but will need to monitor. Will recheck iron studies in early November.     Weight loss: Patient will increase Boost or Ensure  to 2/day. I suspect it is due to his high output ostomy, as he reports good food intake. If weight continues to go down, will refer to a dietician.     Hypokalemia: Secondary to high output ostomy. Will replace IV today, as suspect that po absorption would be low with ostomy. He was also given a list of high potassium foods.    Mucositis: Resolved. Recommend salt/soda swishes at least qid if recurs.     Linda Alves PA-C  [facility]  [address] [place] [address] [phone]

## 2017-08-25 NOTE — PATIENT INSTRUCTIONS
Start tincture of opium for loose stools in addition to Imodium.   Increase Boost or Ensure to 2/day.   Use 1 tsp salt + 1 tsp baking soda in 1 cup water at least 4 times per day if mouth sores or sensitivity.  Eat high potassium foods.      Eating a High-Potassium Diet  Your healthcare provider has told you to eat a high-potassium diet. This may be because you have low levels of potassium in your blood. Or it may be because you have high blood pressure. Potassium is found in many foods. These include dairy products, nuts, seeds, and beans. It s also found in many fruits and vegetables in high amounts.  Guidelines for a high-potassium diet    Eat fruits and vegetables in their fresh or raw form most often.    Check labels for ingredients that have potassium. This includes potassium chloride. Add these items to your diet.    Try salt substitutes. Many of these have potassium.    Avoid licorice. This includes licorice root and teas that have licorice. These can gracia your body of potassium.  Eat plenty of the following high-potassium foods:    Fruits. Good choices are apricots (canned and fresh), bananas, cantaloupe, honeydew melon, kiwi, nectarines, oranges, orange juice, and pears. Dried fruits include apricots, dates, figs, and prunes. Prune juice also has potassium.    Vegetables. Good choices are asparagus, avocado, artichoke, broccoli, bamboo shoots, beets, brussels sprouts, cabbage, celery, chard, okra, potatoes (white and sweet), pumpkin, rutabaga, spinach (cooked), squash, tomatoes. Also good are tomato sauce, tomato juice, and vegetable juice cocktail.    Chicken, fish, clams, and crab    Milk, chocolate milk, buttermilk, and soy milk    Legumes. These include black-eyed peas, chickpeas, lentils, lima beans, navy beans, red kidney beans, soybeans, and split peas.    Nuts and seeds. Try almonds, Brazil nuts, cashews, peanuts, peanut butter, pecans, pumpkin seeds, sunflower seeds, and walnuts.    Breads and  cereals. These include bran and whole-grain products.    Others foods include chocolate, cocoa, coconut milk, and molasses    Date Last Reviewed: 6/20/2015 2000-2017 [employer]. [address] [place] [address], [place] [address]. All rights reserved. This information is not intended as a substitute for professional medical care. Always follow your healthcare professional's instructions.

## 2017-08-25 NOTE — PROGRESS NOTES
Infusion Nursing Note:  Sebas Lopez presents today for C2 D1 FOLFOX, also replaced potassium per protocol.  Patient seen by provider today: Yes: Linda DE LUNA   present during visit today: Not Applicable.    Note: TORB: Linda DE LUNA/MARIA ESTHER Crane RN Replace potassium per the electrolyte replacement protocol.    [facility] infusion called and notified of disconnect time of 1530 on 8/27/17.     Intravenous Access:  Implanted Port.    Treatment Conditions:  Lab Results   Component Value Date    HGB 9.4 08/25/2017     Lab Results   Component Value Date    WBC 6.8 08/25/2017      Lab Results   Component Value Date    ANEU 3.7 08/25/2017     Lab Results   Component Value Date     08/25/2017      Lab Results   Component Value Date     08/25/2017                   Lab Results   Component Value Date    POTASSIUM 3.0 08/25/2017           No results found for: MAG         Lab Results   Component Value Date    CR 0.86 08/25/2017                   Lab Results   Component Value Date    ANNAMARIE 8.9 08/25/2017                Lab Results   Component Value Date    BILITOTAL 0.3 08/25/2017           Lab Results   Component Value Date    ALBUMIN 3.1 08/25/2017                    Lab Results   Component Value Date    ALT 67 08/25/2017           Lab Results   Component Value Date    AST 27 08/25/2017     Results reviewed, labs MET treatment parameters, ok to proceed with treatment.          Post Infusion Assessment:  Patient tolerated infusion without incident.  Blood return noted pre and post infusion.  Site patent and intact, free from redness, edema or discomfort.  No evidence of extravasations.  Port left accessed for continuous 5FU infusion.    Discharge Plan:   Prescription refills given for lorazepam and opium.  Discharge instructions reviewed with: Patient.  Patient and/or family verbalized understanding of discharge instructions and all questions answered.  Copy of AVS reviewed with patient and/or  consult family.  Patient will return 9/8/17 for labs, provider visit and C3 D1 Folfox.  Patient discharged in stable condition accompanied by: friend.  Departure Mode: Ambulatory.    Mariaa Crane RN

## 2017-08-25 NOTE — LETTER
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 Imaging performed at that time showing sigmoid wall thickening with adjacent mesenteric lymphadenopathy with free fluid near the abdominal wall mess from prior hernia surgery. He underwent a colonoscopy that showed an obstructing sigmoid colon mass. Biopsy of the mass showing sigmoid adenocarcinoma. He then developed obstructive symptoms and underwent an exploratory laparotomy on 7/10/2017. During that procedure, he was found to have diffuse peritoneal carcinomatosis. Extensive lysis of adhesions was performed and a small bowel resection was performed with end ileostomy. The biopsies from multiple large peritoneal metastases were also positive for metastatic adenocarcinoma. He started FOLFOX (5-FU, oxalipaltin, leucovorin) on 8/11/17. He comes in today for routine follow up prior to cycle 2.     INTERVAL HISTORY:   Patient denies any nausea with the chemotherapy. He feels that his appetite is okay. He initially tried a low carb diet, but felt he was losing too much weight, so stopped that. He is taking in occasional Boost or Ensure. He reports that his ostomy is high output. He is taking 2 Imodium qid. He did not find a benefit from Lomotil, so stopped it after 2 doses. He has also not been taking his Questran lately. He is occasionally up at night to empty his ostomy. He typically empties it about every 2 hours during the day. He is drinking at least 2 quarts Gatorade per day and 1 quart of other fluids per day. He reports cold sensitivity for 3 days after the oxaliplatin. He denies any numbness or tingling separate from the cold. He had 2 sores on his lower lip that are  "healing. He denies any hand foot syndrome. He has been taking Ativan to help him sleep and also occasionally takes it during the day for nausea. He denies other concerns.     Current Outpatient Prescriptions   Medication Sig Dispense Refill     opium tincture tincture Take 0.6 mLs (6 mg) by mouth 4 times daily 72 mL 0     diphenoxylate-atropine (LOMOTIL) 2.5-0.025 MG per tablet Take 1-2 tablets by mouth 4 times daily as needed for diarrhea 40 tablet 1     LORazepam (ATIVAN) 0.5 MG tablet Take 1 tablet (0.5 mg) by mouth every 4 hours as needed (Anxiety, Nausea/Vomiting or Sleep) 30 tablet 2     prochlorperazine (COMPAZINE) 10 MG tablet Take 1 tablet (10 mg) by mouth every 6 hours as needed (Nausea/Vomiting) 30 tablet 2     ondansetron (ZOFRAN) 8 MG tablet Take 1 tablet (8 mg) by mouth every 8 hours as needed (Nausea/Vomiting) 10 tablet 2     cholestyramine (QUESTRAN) 4 G Packet Take 2 packets by mouth 3 times daily (with meals)       loperamide (IMODIUM) 2 MG capsule Take 2 mg by mouth 4 times daily as needed for diarrhea       Ferrous Sulfate (IRON SUPPLEMENT PO)           No Known Allergies    PHYSICAL EXAMINATION  /75  Pulse 89  Temp 99.9  F (37.7  C) (Oral)  Resp 16  Ht 1.753 m (5' 9.02\")  Wt 73.6 kg (162 lb 4.1 oz)  SpO2 100%  BMI 23.95 kg/m2  Constitutional: Alert, oriented male in no visible distress.  Eyes: PERRL. Anicteric sclerae.  ENT/Mouth: OM moist and pink without lesions or thrush.  CV: RRR.  Resp: CTAB throughout  Abdomen: Soft, non-tender, non-distended. Bowel sounds present. No masses appreciated. No organomegaly noted. Ostomy at RLQ draining liquid brown stool  Extremities: No lower extremity edema appreciated.  Skin: Warm, dry. No bruising or petechiae noted.  Lymph: No cervical or supraclavicular lymphadenopathy appreciated.   Neuro: CN II-XII grossly intact.    LABS:   8/25/2017 11:06   Sodium 136   Potassium 3.0 (L)   Chloride 96   Carbon Dioxide 31   Urea Nitrogen 15   Creatinine " 0.86   GFR Estimate >90   GFR Estimate If Black >90   Calcium 8.9   Anion Gap 9   Albumin 3.1 (L)   Protein Total 7.9   Bilirubin Total 0.3   Alkaline Phosphatase 149   ALT 67   AST 27   Glucose 95   WBC 6.8   Hemoglobin 9.4 (L)   Hematocrit 29.9 (L)   Platelet Count 325   RBC Count 3.86 (L)   MCV 78   MCH 24.4 (L)   MCHC 31.4 (L)   RDW 19.0 (H)   Diff Method Automated Method   % Neutrophils 54.8   % Lymphocytes 23.2   % Monocytes 17.8   % Eosinophils 3.5   % Basophils 0.6   % Immature Granulocytes 0.1   Nucleated RBCs 0   Absolute Neutrophil 3.7   Absolute Lymphocytes 1.6   Absolute Monocytes 1.2   Absolute Eosinophils 0.2   Absolute Basophils 0.0   Abs Immature Granulocytes 0.0   Absolute Nucleated RBC 0.0     IMPRESSION/PLAN:  Sebas Lopez is a 52 year old male with history of ulcerative colitis, now with sigmoid adenocarcinoma with peritoneal carcinomatosis, status-post ex-lap on 7/10 with extensive lysis of adhesions and small-bowel resection with end ileostomy.    Sigmoid cancer with peritoneal carcinomatosis:  He started on FOLFOX on 8/11/17. He appears to have tolerated the first cycle well with some mild cold sensitivity that has since resolved. He will start cycle 2 today. He will follow up in clinic every 2 weeks prior to each cycle.   - Plan to re-image after 4-6 cycles and return to see Dr. Dudley  - He may potentially be able to go on to cytoreductive surgery with [facility] as mentioned in Dr. Dudley's note  - Seeing genetics in September    High-output ostomy: Chronic since surgery. Currently on Imodium up to 8 tablets/daily. He did not find a benefit from Lomotil. Will try adding in tincture of opium 6 mg qid scheduled. He may back off on this if he develops constipation. He will continue to push fluids orally. He also has cholestyramine available at home.     Iron deficiency anemia: secondary to chronic blood loss. Received Infed 8/8. Stable today but will need to monitor. Will recheck iron  studies in early November.     Weight loss: Patient will increase Boost or Ensure to 2/day. I suspect it is due to his high output ostomy, as he reports good food intake. If weight continues to go down, will refer to a dietician.     Hypokalemia: Secondary to high output ostomy. Will replace IV today, as suspect that po absorption would be low with ostomy. He was also given a list of high potassium foods.    Mucositis: Resolved. Recommend salt/soda swishes at least qid if recurs.     Lidna Alves PA-C  [facility]  [address] [place] [id number] [phone]

## 2017-08-25 NOTE — NURSING NOTE
"Oncology Rooming Note    August 25, 2017 11:12 AM   Sebas Lopez is a 52 year old male who presents for:    Chief Complaint   Patient presents with     Port Draw     accessed with power needle for labs and heparin locked, vitals checked     Oncology Clinic Visit     Return visit related to Colon Cancer     Initial Vitals: /75  Pulse 89  Temp 99.9  F (37.7  C) (Oral)  Resp 16  Ht 1.753 m (5' 9.02\")  Wt 73.6 kg (162 lb 4.1 oz)  SpO2 100%  BMI 23.95 kg/m2 Estimated body mass index is 23.95 kg/(m^2) as calculated from the following:    Height as of this encounter: 1.753 m (5' 9.02\").    Weight as of this encounter: 73.6 kg (162 lb 4.1 oz). Body surface area is 1.89 meters squared.  No Pain (0) Comment: Data Unavailable   No LMP for male patient.  Allergies reviewed: Yes  Medications reviewed: Yes    Medications: MEDICATION REFILLS NEEDED TODAY. Provider was notified.  Pharmacy name entered into [facility]: [facility] - [place], [place] - [address] [place] [address]    Clinical concerns: Refill needed for Lorazepam (ATIVAN) 0.5 MG tablet. Provider was notified.    10 minutes for nursing intake (face to face time)     Gina Gutierrez LPN            "

## 2017-08-25 NOTE — MR AVS SNAPSHOT
After Visit Summary   8/25/2017    Sebas Lopez    MRN: [id number]           Patient Information     Date Of Birth          1965        Visit Information        Provider Department      8/25/2017 11:10 AM Linda Alves PA-C [facility]        Today's Diagnoses     Cancer of sigmoid colon (H)    -  1    Diarrhea, unspecified type        Peritoneal carcinomatosis (H)          Care Instructions    Start tincture of opium for loose stools in addition to Imodium.   Increase Boost or Ensure to 2/day.   Use 1 tsp salt + 1 tsp baking soda in 1 cup water at least 4 times per day if mouth sores or sensitivity.  Eat high potassium foods.      Eating a High-Potassium Diet  Your healthcare provider has told you to eat a high-potassium diet. This may be because you have low levels of potassium in your blood. Or it may be because you have high blood pressure. Potassium is found in many foods. These include dairy products, nuts, seeds, and beans. It s also found in many fruits and vegetables in high amounts.  Guidelines for a high-potassium diet    Eat fruits and vegetables in their fresh or raw form most often.    Check labels for ingredients that have potassium. This includes potassium chloride. Add these items to your diet.    Try salt substitutes. Many of these have potassium.    Avoid licorice. This includes licorice root and teas that have licorice. These can gracia your body of potassium.  Eat plenty of the following high-potassium foods:    Fruits. Good choices are apricots (canned and fresh), bananas, cantaloupe, honeydew melon, kiwi, nectarines, oranges, orange juice, and pears. Dried fruits include apricots, dates, figs, and prunes. Prune juice also has potassium.    Vegetables. Good choices are asparagus, avocado, artichoke, broccoli, bamboo shoots, beets, brussels sprouts, cabbage, celery, chard, okra, potatoes (white and sweet), pumpkin, rutabaga, spinach (cooked),  squash, tomatoes. Also good are tomato sauce, tomato juice, and vegetable juice cocktail.    Chicken, fish, clams, and crab    Milk, chocolate milk, buttermilk, and soy milk    Legumes. These include black-eyed peas, chickpeas, lentils, lima beans, navy beans, red kidney beans, soybeans, and split peas.    Nuts and seeds. Try almonds, Brazil nuts, cashews, peanuts, peanut butter, pecans, pumpkin seeds, sunflower seeds, and walnuts.    Breads and cereals. These include bran and whole-grain products.    Others foods include chocolate, cocoa, coconut milk, and molasses    Date Last Reviewed: 6/20/2015 2000-2017 [employer]. [address] [place] [address]. All rights reserved. This information is not intended as a substitute for professional medical care. Always follow your healthcare professional's instructions.                Follow-ups after your visit        Your next 10 appointments already scheduled     Sep 08, 2017  7:15 AM CDT   [facility] Lab Draw with [facility] LAB DRAW   [facility] Lab Draw ([facility])    [address] [place] [id number]   [phone]            Sep 08, 2017  7:50 AM CDT   (Arrive by 7:35 AM)   Return Visit with Linda Alves PA-C   [facility] ([facility])    [address] [place] [address]   [phone]            Sep 08, 2017  8:30 AM CDT   Infusion 240 with  ONCOLOGY INFUSION, UC 20 ATC   [facility] ([facility])    [address] [place] [address]   [phone]            Sep 11, 2017  9:00 AM CDT   (Arrive by 8:45 AM)   NEW WITH ROOM with Viola Vazquez GC,  2 114 CONSULT Critical access hospital [facility] ([facility])    [address] [place] [address]   [phone]            Sep 11, 2017 10:15 AM  CDT   [facility] Lab Draw with  [facility] LAB DRAW   [facility]    [address] [id number] [phone]            Sep 22, 2017  7:15 AM CDT   [facility] Lab Draw with [facility] LAB DRAW   [facility] Lab Draw ([facility])    [address] [place] [id number] [phone]            Sep 22, 2017  7:50 AM CDT   (Arrive by 7:35 AM)   Return Visit with Linda Alves PA-C   [facility] ([facility])    [address] [place] [id number] [phone]            Sep 22, 2017  8:30 AM CDT   Infusion 240 with UC ONCOLOGY INFUSION   [facility] ([facility])    [address] [place] [id number] [phone]              Who to contact     If you have questions or need follow up information about today's clinic visit or your schedule please contact [facility] directly at [phone].  Normal or non-critical lab and imaging results will be communicated to you by StudentFunderhart, letter or phone within 4 business days after the clinic has received the results. If you do not hear from us within 7 days, please contact the clinic through StudentFunderhart or phone. If you have a critical or abnormal lab result, we will notify you by phone as soon as possible.  Submit refill requests through [employer] or call your pharmacy and they will forward the refill request to us. Please allow 3 business days for your refill to be completed.          Additional Information About Your Visit        StudentFunderharGeeYuu Information     [employer] gives you secure access to your electronic health record. If you see a primary care provider, you can also send messages to your care team and make appointments. If you have questions, please call your primary care clinic.  If you do  "not have a primary care provider, please call [phone] and they will assist you.        Care EveryWhere ID     This is your Care EveryWhere ID. This could be used by other organizations to access your [facility] medical records  [id number]        Your Vitals Were     Pulse Temperature Respirations Height Pulse Oximetry BMI (Body Mass Index)    89 99.9  F (37.7  C) (Oral) 16 1.753 m (5' 9.02\") 100% 23.95 kg/m2       Blood Pressure from Last 3 Encounters:   08/25/17 120/75   08/11/17 114/75   08/10/17 107/73    Weight from Last 3 Encounters:   08/25/17 73.6 kg (162 lb 4.1 oz)   08/11/17 77 kg (169 lb 12.8 oz)   08/10/17 78.8 kg (173 lb 12.8 oz)              Today, you had the following     No orders found for display         Today's Medication Changes          These changes are accurate as of: 8/25/17  5:53 PM.  If you have any questions, ask your nurse or doctor.               Start taking these medicines.        Dose/Directions    opium tincture tincture   Used for:  Diarrhea, unspecified type   Started by:  Linda Alves PA-C        Dose:  6 mg   Take 0.6 mLs (6 mg) by mouth 4 times daily   Quantity:  72 mL   Refills:  0            Where to get your medicines      Some of these will need a paper prescription and others can be bought over the counter.  Ask your nurse if you have questions.     Bring a paper prescription for each of these medications     opium tincture tincture                Primary Care Provider Office Phone # Fax #    Waylon Han [phone]       [place] [facility] [address] [facility] [address]        Equal Access to Services     MELVIN DUNN AH: Cristina Funk, wahayderda lumarine, qaybta kaalmada lewis, bhargav willis. So [facility] [phone].    ATENCIÓN: Si habla español, tiene a cid disposición servicios gratuitos de asistencia lingüística. Llame al [phone].    We comply with applicable federal civil rights laws and " [place] laws. We do not discriminate on the basis of race, color, national origin, age, disability sex, sexual orientation or gender identity.            Thank you!     Thank you for choosing [facility]  for your care. Our goal is always to provide you with excellent care. Hearing back from our patients is one way we can continue to improve our services. Please take a few minutes to complete the written survey that you may receive in the mail after your visit with us. Thank you!             Your Updated Medication List - Protect others around you: Learn how to safely use, store and throw away your medicines at www.disposemymeds.org.          This list is accurate as of: 8/25/17  5:53 PM.  Always use your most recent med list.                   Brand Name Dispense Instructions for use Diagnosis    cholestyramine 4 G Packet    QUESTRAN     Take 2 packets by mouth 3 times daily (with meals)        diphenoxylate-atropine 2.5-0.025 MG per tablet    LOMOTIL    40 tablet    Take 1-2 tablets by mouth 4 times daily as needed for diarrhea    Diarrhea due to drug       IRON SUPPLEMENT PO           loperamide 2 MG capsule    IMODIUM     Take 2 mg by mouth 4 times daily as needed for diarrhea        LORazepam 0.5 MG tablet    ATIVAN    30 tablet    Take 1 tablet (0.5 mg) by mouth every 4 hours as needed (Anxiety, Nausea/Vomiting or Sleep)    Peritoneal carcinomatosis (H), Cancer of sigmoid colon (H)       ondansetron 8 MG tablet    ZOFRAN    10 tablet    Take 1 tablet (8 mg) by mouth every 8 hours as needed (Nausea/Vomiting)    Peritoneal carcinomatosis (H), Cancer of sigmoid colon (H)       opium tincture tincture     72 mL    Take 0.6 mLs (6 mg) by mouth 4 times daily    Diarrhea, unspecified type       prochlorperazine 10 MG tablet    COMPAZINE    30 tablet    Take 1 tablet (10 mg) by mouth every 6 hours as needed (Nausea/Vomiting)    Peritoneal carcinomatosis (H), Cancer of sigmoid colon (H)

## 2017-08-25 NOTE — PATIENT INSTRUCTIONS
Contact Numbers  [facility]: [phone]    After Hours:  [phone]  Triage: [phone]    Please call the [facility] Triage line if you experience a temperature greater than or equal to 100.5, shaking chills, have uncontrolled nausea, vomiting and/or diarrhea, dizziness, shortness of breath, chest pain, bleeding, unexplained bruising, or if you have any other new/concerning symptoms, questions or concerns.     If it is after hours, weekends, or holidays, please call the main hospital  at  [phone] and ask to speak to the Oncology doctor on call.     If you are having any concerning symptoms or wish to speak to a provider before your next infusion visit, please call your care coordinator or triage to notify them so we can adequately serve you.     If you need a refill on a narcotic prescription or other medication, please call triage before your infusion appointment.         August 2017 Sunday Monday Tuesday Wednesday Thursday Friday Saturday             1     2     3     [facility] NEW    3:30 PM   (60 min.)   Albert Long MD   [facility] 4     5       6     7     8     [facility] ONC INFUSION 360    8:00 AM   (360 min.)    ONCOLOGY INFUSION   [facility] 9     10     Outpatient Visit   11:16 AM   [facility]     IR CHEST PORT PLACEMENT >5 YRS   11:30 AM   (60 min.)   [id number]   [facility]     INSERT PORT VASCULAR ACCESS    1:00 PM   Malena Andrew PA-C    [facility]    7:15 AM   (15 min.)   [facility] LAB DRAW   [facility] Lab Draw     [facility] RETURN    7:35 AM   (50 min.)   Chiquis Negro PA-C   [facility] ONC INFUSION 240    8:00 AM   (240 min.)    ONCOLOGY INFUSION   [facility] 12       13     14     15     16     17     18     19       20     21     22     23     24     25     [facility] LAB DRAW   10:45 AM   (15 min.)   [facility]  LAB DRAW   [facility] Lab Draw     [facility] RETURN   10:55 AM   (50 min.)   Linda Alves PA-C   [facility]   12:00 PM   (240 min.)    ONCOLOGY INFUSION   [facility] 26       27     28     29     30     31 September 2017 Sunday Monday Tuesday Wednesday Thursday Friday Saturday                            1     2       3     4     5     6     7     8     P [facility] LAB DRAW    7:15 AM   (15 min.)    [facility]     [facility] RETURN    7:35 AM   (50 min.)   Linda Alves PA-C   [facility]    8:30 AM   (240 min.)    ONCOLOGY INFUSION   [facility] 9       10     11     [facility] NEW WITH ROOM    8:45 AM   (75 min.)   Viola Vazquez GC   [facility]   10:15 AM   (15 min.)    [facility] LAB [facility]RAW   [facility] 12     13     14     15     16       17     18     19     20     21     22     P [facility] LAB DRAW    7:15 AM   (15 min.)    [facility]   [facility]     [facility] RETURN    7:35 AM   (50 min.)   Linda Alves PA-C   [facility]    8:30 AM   (240 min.)    ONCOLOGY INFUSION   [facility] 23       24     25     26     27     28     29     30                  Lab Results:  Recent Results (from the past 12 hour(s))   CBC with platelets differential    Collection Time: 08/25/17 11:06 AM   Result Value Ref Range    WBC 6.8 4.0 - 11.0 10e9/L    RBC Count 3.86 (L) 4.4 - 5.9 10e12/L    Hemoglobin 9.4 (L) 13.3 - 17.7 g/dL    Hematocrit 29.9 (L) 40.0 - 53.0 %    MCV 78 78 - 100 fl    MCH 24.4 (L) 26.5 - 33.0 pg    MCHC 31.4 (L) 31.5 - 36.5 g/dL    RDW 19.0 (H) 10.0 - 15.0 %    Platelet Count 325 150 - 450 10e9/L    Diff Method Automated Method     % Neutrophils 54.8 %    % Lymphocytes  23.2 %    % Monocytes 17.8 %    % Eosinophils 3.5 %    % Basophils 0.6 %    % Immature Granulocytes 0.1 %    Nucleated RBCs 0 0 /100    Absolute Neutrophil 3.7 1.6 - 8.3 10e9/L    Absolute Lymphocytes 1.6 0.8 - 5.3 10e9/L    Absolute Monocytes 1.2 0.0 - 1.3 10e9/L    Absolute Eosinophils 0.2 0.0 - 0.7 10e9/L    Absolute Basophils 0.0 0.0 - 0.2 10e9/L    Abs Immature Granulocytes 0.0 0 - 0.4 10e9/L    Absolute Nucleated RBC 0.0    Comprehensive metabolic panel    Collection Time: 08/25/17 11:06 AM   Result Value Ref Range    Sodium 136 133 - 144 mmol/L    Potassium 3.0 (L) 3.4 - 5.3 mmol/L    Chloride 96 94 - 109 mmol/L    Carbon Dioxide 31 20 - 32 mmol/L    Anion Gap 9 3 - 14 mmol/L    Glucose 95 70 - 99 mg/dL    Urea Nitrogen 15 7 - 30 mg/dL    Creatinine 0.86 0.66 - 1.25 mg/dL    GFR Estimate >90 >60 mL/min/1.7m2    GFR Estimate If Black >90 >60 mL/min/1.7m2    Calcium 8.9 8.5 - 10.1 mg/dL    Bilirubin Total 0.3 0.2 - 1.3 mg/dL    Albumin 3.1 (L) 3.4 - 5.0 g/dL    Protein Total 7.9 6.8 - 8.8 g/dL    Alkaline Phosphatase 149 40 - 150 U/L    ALT 67 0 - 70 U/L    AST 27 0 - 45 U/L

## 2017-09-08 ENCOUNTER — APPOINTMENT (OUTPATIENT)
Dept: LAB | Facility: CLINIC | Age: 52
End: 2017-09-08
Attending: INTERNAL MEDICINE
Payer: COMMERCIAL

## 2017-09-08 ENCOUNTER — ONCOLOGY VISIT (OUTPATIENT)
Dept: ONCOLOGY | Facility: CLINIC | Age: 52
End: 2017-09-08
Attending: INTERNAL MEDICINE
Payer: COMMERCIAL

## 2017-09-08 VITALS
BODY MASS INDEX: 23 KG/M2 | RESPIRATION RATE: 18 BRPM | WEIGHT: 155.8 LBS | SYSTOLIC BLOOD PRESSURE: 126 MMHG | HEART RATE: 101 BPM | DIASTOLIC BLOOD PRESSURE: 86 MMHG | OXYGEN SATURATION: 98 % | TEMPERATURE: 97.7 F

## 2017-09-08 DIAGNOSIS — C18.7 CANCER OF SIGMOID COLON (H): ICD-10-CM

## 2017-09-08 DIAGNOSIS — Z93.2 HIGH OUTPUT ILEOSTOMY (H): ICD-10-CM

## 2017-09-08 DIAGNOSIS — C78.6 PERITONEAL CARCINOMATOSIS (H): Primary | ICD-10-CM

## 2017-09-08 DIAGNOSIS — R19.8 HIGH OUTPUT ILEOSTOMY (H): ICD-10-CM

## 2017-09-08 DIAGNOSIS — R19.7 DIARRHEA, UNSPECIFIED TYPE: ICD-10-CM

## 2017-09-08 DIAGNOSIS — R63.4 LOSS OF WEIGHT: Primary | ICD-10-CM

## 2017-09-08 DIAGNOSIS — C78.6 PERITONEAL CARCINOMATOSIS (H): ICD-10-CM

## 2017-09-08 LAB
ALBUMIN SERPL-MCNC: 3.4 G/DL (ref 3.4–5)
ALP SERPL-CCNC: 153 U/L (ref 40–150)
ALT SERPL W P-5'-P-CCNC: 38 U/L (ref 0–70)
ANION GAP SERPL CALCULATED.3IONS-SCNC: 9 MMOL/L (ref 3–14)
AST SERPL W P-5'-P-CCNC: 34 U/L (ref 0–45)
BASOPHILS # BLD AUTO: 0.1 10E9/L (ref 0–0.2)
BASOPHILS NFR BLD AUTO: 0.8 %
BILIRUB SERPL-MCNC: 0.5 MG/DL (ref 0.2–1.3)
BUN SERPL-MCNC: 22 MG/DL (ref 7–30)
C DIFF TOX B STL QL: NEGATIVE
CALCIUM SERPL-MCNC: 9.6 MG/DL (ref 8.5–10.1)
CHLORIDE SERPL-SCNC: 89 MMOL/L (ref 94–109)
CO2 SERPL-SCNC: 33 MMOL/L (ref 20–32)
CREAT SERPL-MCNC: 1.2 MG/DL (ref 0.66–1.25)
DIFFERENTIAL METHOD BLD: ABNORMAL
EOSINOPHIL # BLD AUTO: 0.3 10E9/L (ref 0–0.7)
EOSINOPHIL NFR BLD AUTO: 3.9 %
ERYTHROCYTE [DISTWIDTH] IN BLOOD BY AUTOMATED COUNT: 22.3 % (ref 10–15)
GFR SERPL CREATININE-BSD FRML MDRD: 63 ML/MIN/1.7M2
GLUCOSE SERPL-MCNC: 109 MG/DL (ref 70–99)
HCT VFR BLD AUTO: 36.2 % (ref 40–53)
HGB BLD-MCNC: 11.6 G/DL (ref 13.3–17.7)
IMM GRANULOCYTES # BLD: 0 10E9/L (ref 0–0.4)
IMM GRANULOCYTES NFR BLD: 0.5 %
LYMPHOCYTES # BLD AUTO: 1.8 10E9/L (ref 0.8–5.3)
LYMPHOCYTES NFR BLD AUTO: 26.7 %
MAGNESIUM SERPL-MCNC: 2 MG/DL (ref 1.6–2.3)
MCH RBC QN AUTO: 25.3 PG (ref 26.5–33)
MCHC RBC AUTO-ENTMCNC: 32 G/DL (ref 31.5–36.5)
MCV RBC AUTO: 79 FL (ref 78–100)
MONOCYTES # BLD AUTO: 1.1 10E9/L (ref 0–1.3)
MONOCYTES NFR BLD AUTO: 17.1 %
NEUTROPHILS # BLD AUTO: 3.4 10E9/L (ref 1.6–8.3)
NEUTROPHILS NFR BLD AUTO: 51 %
NRBC # BLD AUTO: 0 10*3/UL
NRBC BLD AUTO-RTO: 0 /100
PLATELET # BLD AUTO: 207 10E9/L (ref 150–450)
POTASSIUM SERPL-SCNC: 3.3 MMOL/L (ref 3.4–5.3)
PROT SERPL-MCNC: 9.2 G/DL (ref 6.8–8.8)
RBC # BLD AUTO: 4.58 10E12/L (ref 4.4–5.9)
SODIUM SERPL-SCNC: 131 MMOL/L (ref 133–144)
SPECIMEN SOURCE: NORMAL
WBC # BLD AUTO: 6.7 10E9/L (ref 4–11)

## 2017-09-08 PROCEDURE — 25000128 H RX IP 250 OP 636: Mod: ZF | Performed by: PHYSICIAN ASSISTANT

## 2017-09-08 PROCEDURE — 99212 OFFICE O/P EST SF 10 MIN: CPT | Mod: ZF

## 2017-09-08 PROCEDURE — 96415 CHEMO IV INFUSION ADDL HR: CPT

## 2017-09-08 PROCEDURE — 83735 ASSAY OF MAGNESIUM: CPT | Performed by: INTERNAL MEDICINE

## 2017-09-08 PROCEDURE — 80053 COMPREHEN METABOLIC PANEL: CPT | Performed by: INTERNAL MEDICINE

## 2017-09-08 PROCEDURE — 87493 C DIFF AMPLIFIED PROBE: CPT | Performed by: PHYSICIAN ASSISTANT

## 2017-09-08 PROCEDURE — 96411 CHEMO IV PUSH ADDL DRUG: CPT

## 2017-09-08 PROCEDURE — 96367 TX/PROPH/DG ADDL SEQ IV INF: CPT

## 2017-09-08 PROCEDURE — 96416 CHEMO PROLONG INFUSE W/PUMP: CPT

## 2017-09-08 PROCEDURE — 96368 THER/DIAG CONCURRENT INF: CPT

## 2017-09-08 PROCEDURE — 96413 CHEMO IV INFUSION 1 HR: CPT

## 2017-09-08 PROCEDURE — 96366 THER/PROPH/DIAG IV INF ADDON: CPT

## 2017-09-08 PROCEDURE — 96375 TX/PRO/DX INJ NEW DRUG ADDON: CPT

## 2017-09-08 PROCEDURE — 85025 COMPLETE CBC W/AUTO DIFF WBC: CPT | Performed by: INTERNAL MEDICINE

## 2017-09-08 PROCEDURE — 99214 OFFICE O/P EST MOD 30 MIN: CPT | Mod: ZP | Performed by: PHYSICIAN ASSISTANT

## 2017-09-08 RX ORDER — DIPHENHYDRAMINE HYDROCHLORIDE 50 MG/ML
50 INJECTION INTRAMUSCULAR; INTRAVENOUS
Status: CANCELLED
Start: 2017-09-08

## 2017-09-08 RX ORDER — METHYLPREDNISOLONE SODIUM SUCCINATE 125 MG/2ML
125 INJECTION, POWDER, LYOPHILIZED, FOR SOLUTION INTRAMUSCULAR; INTRAVENOUS
Status: CANCELLED
Start: 2017-09-08

## 2017-09-08 RX ORDER — ONDANSETRON 8 MG/1
8 TABLET, FILM COATED ORAL EVERY 8 HOURS
Qty: 90 TABLET | Refills: 2 | Status: SHIPPED | OUTPATIENT
Start: 2017-09-08 | End: 2017-11-20

## 2017-09-08 RX ORDER — MEPERIDINE HYDROCHLORIDE 25 MG/ML
25 INJECTION INTRAMUSCULAR; INTRAVENOUS; SUBCUTANEOUS EVERY 30 MIN PRN
Status: CANCELLED | OUTPATIENT
Start: 2017-09-08

## 2017-09-08 RX ORDER — FLUOROURACIL 50 MG/ML
400 INJECTION, SOLUTION INTRAVENOUS ONCE
Status: COMPLETED | OUTPATIENT
Start: 2017-09-08 | End: 2017-09-08

## 2017-09-08 RX ORDER — ALBUTEROL SULFATE 90 UG/1
1-2 AEROSOL, METERED RESPIRATORY (INHALATION)
Status: CANCELLED
Start: 2017-09-08

## 2017-09-08 RX ORDER — HEPARIN SODIUM (PORCINE) LOCK FLUSH IV SOLN 100 UNIT/ML 100 UNIT/ML
5 SOLUTION INTRAVENOUS EVERY 8 HOURS PRN
Status: DISCONTINUED | OUTPATIENT
Start: 2017-09-08 | End: 2017-09-08 | Stop reason: HOSPADM

## 2017-09-08 RX ORDER — SODIUM CHLORIDE 9 MG/ML
1000 INJECTION, SOLUTION INTRAVENOUS CONTINUOUS PRN
Status: CANCELLED
Start: 2017-09-08

## 2017-09-08 RX ORDER — EPINEPHRINE 0.3 MG/.3ML
0.3 INJECTION SUBCUTANEOUS EVERY 5 MIN PRN
Status: CANCELLED | OUTPATIENT
Start: 2017-09-08

## 2017-09-08 RX ORDER — ALBUTEROL SULFATE 0.83 MG/ML
2.5 SOLUTION RESPIRATORY (INHALATION)
Status: CANCELLED | OUTPATIENT
Start: 2017-09-08

## 2017-09-08 RX ORDER — LORAZEPAM 2 MG/ML
0.5 INJECTION INTRAMUSCULAR EVERY 4 HOURS PRN
Status: CANCELLED
Start: 2017-09-08

## 2017-09-08 RX ORDER — FLUOROURACIL 50 MG/ML
400 INJECTION, SOLUTION INTRAVENOUS ONCE
Status: CANCELLED | OUTPATIENT
Start: 2017-09-08

## 2017-09-08 RX ORDER — EPINEPHRINE 1 MG/ML
0.3 INJECTION INTRAMUSCULAR; INTRAVENOUS; SUBCUTANEOUS EVERY 5 MIN PRN
Status: CANCELLED | OUTPATIENT
Start: 2017-09-08

## 2017-09-08 RX ADMIN — OXALIPLATIN 158 MG: 5 INJECTION, SOLUTION, CONCENTRATE INTRAVENOUS at 11:09

## 2017-09-08 RX ADMIN — SODIUM CHLORIDE, PRESERVATIVE FREE 5 ML: 5 INJECTION INTRAVENOUS at 07:27

## 2017-09-08 RX ADMIN — DEXAMETHASONE SODIUM PHOSPHATE: 10 INJECTION, SOLUTION INTRAMUSCULAR; INTRAVENOUS at 10:51

## 2017-09-08 RX ADMIN — POTASSIUM CHLORIDE: 149 INJECTION, SOLUTION, CONCENTRATE INTRAVENOUS at 08:53

## 2017-09-08 RX ADMIN — FLUOROURACIL 745 MG: 50 INJECTION, SOLUTION INTRAVENOUS at 13:09

## 2017-09-08 RX ADMIN — LEUCOVORIN CALCIUM 700 MG: 500 INJECTION, POWDER, LYOPHILIZED, FOR SOLUTION INTRAMUSCULAR; INTRAVENOUS at 11:11

## 2017-09-08 RX ADMIN — DEXTROSE MONOHYDRATE: 5 INJECTION, SOLUTION INTRAVENOUS at 10:50

## 2017-09-08 ASSESSMENT — PAIN SCALES - GENERAL: PAINLEVEL: NO PAIN (0)

## 2017-09-08 NOTE — LETTER
9/8/2017      RE: Sebas Lopez  [address]  [facility] [address]       HEMATOLOGY/ONCOLOGY PROGRESS NOTE  Sep 8, 2017    REASON FOR VISIT: follow-up of sigmoid adenocarcinoma with peritoneal carcinomatosis    DIAGNOSIS:   Sebas Lopez is a 52 year old male with history of ulcerative colitis who presented with abdominal pain in May 2017. Imaging performed at that time showing sigmoid wall thickening with adjacent mesenteric lymphadenopathy with free fluid near the abdominal wall mess from prior hernia surgery. He underwent a colonoscopy that showed an obstructing sigmoid colon mass. Biopsy of the mass showing sigmoid adenocarcinoma. He then developed obstructive symptoms and underwent an exploratory laparotomy on 7/10/2017. During that procedure, he was found to have diffuse peritoneal carcinomatosis. Extensive lysis of adhesions was performed and a small bowel resection was performed with end ileostomy. The biopsies from multiple large peritoneal metastases were also positive for metastatic adenocarcinoma. He started FOLFOX (5-FU, oxalipaltin, leucovorin) on 8/11/17. He comes in today for routine follow up prior to cycle 3.     INTERVAL HISTORY:   Patient reports that he has been keeping track of his calorie intake and is getting in 5787-1297 calories/day. He has been taking the tincture of opium qid and Imodium 8/day. He is emptying his ostomy bag 10-12 times per day. The consistency of his stool is mildly improved, but still loose. He is taking in 1-1.5 gallons fluids/day. He reports that his stools actually seem to slow down with the infusions. He had one day of tingling in his fingers on day 2 and the cold sensitivity lasted for 5-6 days. He denies any neuropathy in his feet. He denies any mouth or lip sores this cycle. He is taking Ativan at night on the first week while he is attached to the pump. He has noticed occasional mucus from his rectum. He denies other concerns.    Current Outpatient  Prescriptions   Medication Sig Dispense Refill     opium tincture tincture Take 0.6 mLs (6 mg) by mouth 4 times daily 72 mL 0     diphenoxylate-atropine (LOMOTIL) 2.5-0.025 MG per tablet Take 1-2 tablets by mouth 4 times daily as needed for diarrhea 40 tablet 1     LORazepam (ATIVAN) 0.5 MG tablet Take 1 tablet (0.5 mg) by mouth every 4 hours as needed (Anxiety, Nausea/Vomiting or Sleep) 30 tablet 2     prochlorperazine (COMPAZINE) 10 MG tablet Take 1 tablet (10 mg) by mouth every 6 hours as needed (Nausea/Vomiting) 30 tablet 2     ondansetron (ZOFRAN) 8 MG tablet Take 1 tablet (8 mg) by mouth every 8 hours as needed (Nausea/Vomiting) 10 tablet 2     cholestyramine (QUESTRAN) 4 G Packet Take 2 packets by mouth 3 times daily (with meals)       loperamide (IMODIUM) 2 MG capsule Take 2 mg by mouth 4 times daily as needed for diarrhea       Ferrous Sulfate (IRON SUPPLEMENT PO)           No Known Allergies    PHYSICAL EXAMINATION  /86 (BP Location: Right arm, Patient Position: Sitting, Cuff Size: Adult Regular)  Pulse 101  Temp 97.7  F (36.5  C) (Oral)  Resp 18  Wt 70.7 kg (155 lb 12.8 oz)  SpO2 98%  BMI 23 kg/m2  Constitutional: Alert, oriented male in no visible distress.  Eyes: PERRL. Anicteric sclerae.  ENT/Mouth: OM moist and pink without lesions or thrush.  CV: RRR.  Resp: CTAB throughout  Abdomen: Soft, non-tender, non-distended. Bowel sounds present. No masses appreciated. No organomegaly noted. Ostomy at RLQ draining liquid stool with very little form.  Extremities: No lower extremity edema appreciated.  Skin: Warm, dry. No bruising or petechiae noted.  Lymph: No cervical or supraclavicular lymphadenopathy appreciated.   Neuro: CN II-XII grossly intact.    LABS:   9/8/2017 07:38   Sodium 131 (L)   Potassium 3.3 (L)   Chloride 89 (L)   Carbon Dioxide 33 (H)   Urea Nitrogen 22   Creatinine 1.20   GFR Estimate 63   GFR Estimate If Black 76   Calcium 9.6   Anion Gap 9   Magnesium 2.0   Albumin 3.4    Protein Total 9.2 (H)   Bilirubin Total 0.5   Alkaline Phosphatase 153 (H)   ALT 38   AST 34   Glucose 109 (H)   WBC 6.7   Hemoglobin 11.6 (L)   Hematocrit 36.2 (L)   Platelet Count 207   RBC Count 4.58   MCV 79   MCH 25.3 (L)   MCHC 32.0   RDW 22.3 (H)   Diff Method Automated Method   % Neutrophils 51.0   % Lymphocytes 26.7   % Monocytes 17.1   % Eosinophils 3.9   % Basophils 0.8   % Immature Granulocytes 0.5   Nucleated RBCs 0   Absolute Neutrophil 3.4   Absolute Lymphocytes 1.8   Absolute Monocytes 1.1   Absolute Eosinophils 0.3   Absolute Basophils 0.1   Abs Immature Granulocytes 0.0   Absolute Nucleated RBC 0.0     IMPRESSION/PLAN:  Sebas Lopez is a 52 year old male with history of ulcerative colitis, now with sigmoid adenocarcinoma with peritoneal carcinomatosis, status-post ex-lap on 7/10 with extensive lysis of adhesions and small-bowel resection with end ileostomy.    Sigmoid cancer with peritoneal carcinomatosis:  He started on FOLFOX on 8/11/17. He appears to have tolerated the first 2 cycles well with some mild cold sensitivity that has since resolved. He had brief tingling in his fingers with cycle 2. He will start cycle 3 today. He will follow up in clinic every 2 weeks prior to each cycle.   - Plan to re-image after 4-6 cycles and return to see Dr. Dudley  - He may potentially be able to go on to cytoreductive surgery with [facility] as mentioned in Dr. Dudley's note  - Seeing genetics next week. Discussed bringing a list of family member's serious medical issues to appointment, particularly any cancers. Discussed that he does not have to have genetic testing if he does not wish to have it, but the visit is helpful to discuss if there are any potential concerns for a genetic link to his diagnosis.     High-output ostomy: Chronic since surgery. Currently on Imodium up to 8 tablets/day and tincture of opium 6 mg qid. He previously did not find a benefit from Lomotil. Will try adding in Zofran 8  mg tid scheduled, as this medication often causes constipation. He will continue to push fluids orally. He appears mildly dehydrated today and will receive 1L IV NS today. He also has cholestyramine available at home. I will check with Dr. Dudley to see what other ideas he has for management.    Iron deficiency anemia: secondary to chronic blood loss. Received Infed 8/8. Hgb is trending up today.  Will recheck iron studies in early November.     Weight loss: Patient will continue to work on a high calorie diet. He is currently getting in 0879-3069 calories/day. He will meet with a dietician next week to discuss diet management to help with the weight loss and high output ostomy.     Hypokalemia: Better than last time, but still mildly low. Will replace IV today. Secondary to high output ostomy. He was also previously given a list of high potassium foods.    Linda Alves PA-C  [facility]  [address] [place] [address]  [phone]    Addendum: Discussed ostomy output with Dr. Dudley. Will plan to add in Metamucil tid and check for C.diff.    Linda Alves PA-C

## 2017-09-08 NOTE — PATIENT INSTRUCTIONS
Schedule Zofran 8 mg three times/day in addition to tincture of opium and Imodium.  Will schedule appointment with dietician.

## 2017-09-08 NOTE — NURSING NOTE
"Oncology Rooming Note    September 8, 2017 7:47 AM   Sebas Lopez is a 52 year old male who presents for:    Chief Complaint   Patient presents with     Port Draw     Labs drawn via Power Port - line flushed, hep locked and dressing applied for home use. VS taken. Patient checked in for next appointment.     Oncology Clinic Visit     Return-colon Ca     Initial Vitals: /86 (BP Location: Right arm, Patient Position: Sitting, Cuff Size: Adult Regular)  Pulse 101  Temp 97.7  F (36.5  C) (Oral)  Resp 18  Wt 70.7 kg (155 lb 12.8 oz)  SpO2 98%  BMI 23 kg/m2 Estimated body mass index is 23 kg/(m^2) as calculated from the following:    Height as of 8/25/17: 1.753 m (5' 9.02\").    Weight as of this encounter: 70.7 kg (155 lb 12.8 oz). Body surface area is 1.86 meters squared.  No Pain (0) Comment: Data Unavailable   No LMP for male patient.  Allergies reviewed: Yes  Medications reviewed: Yes    Medications: Medication refills not needed today.  Pharmacy name entered into [employer]: [facility] - [place] - [address]    Clinical concerns: No new concerns    6 minutes for nursing intake (face to face time)     Monica Fuentes LPN            "

## 2017-09-08 NOTE — PATIENT INSTRUCTIONS
Contact Numbers    [facility] [place] [phone]  [facility] [place]:  [phone]    Call triage with chills and/or temperature greater than or equal to 100.5, uncontrolled nausea/vomiting, diarrhea, constipation, dizziness, shortness of breath, chest pain, bleeding, unexplained bruising, or any new/concerning symptoms, questions/concerns.     If you are having any concerning symptoms or wish to speak to a provider before your next infusion visit, please call your care coordinator or triage to notify them so we can adequately serve you.       After Hours: [phone]    If after hours, weekends, or holidays, call main hospital  and ask for Oncology doctor on call.     Schedule Zofran 8 mg three times/day in addition to tincture of opium and Imodium for diarrhea.  Will schedule appointment with dietician.   Avoid foods that can make the stool loose, such as raw fruits and vegetables, garlic, onions, milk, alcohol, spicy foods, and iced drinks.        September 2017 Sunday Monday Tuesday Wednesday Thursday Friday Saturday                            1     2       3     4     5     6     7     8     [facility] DRAW    7:15 AM   (15 min.)    [facility] DRAW   [facility] Lab Draw     [facility] RETURN    7:35 AM   (50 min.)   Linda Alves PA-C   [facility] ONC INFUSION 240    8:30 AM   (240 min.)    ONCOLOGY INFUSION   [facility] 9       10     11     [facility] NEW WITH ROOM    8:45 AM   (75 min.)   Viola Vazquez GC   [facility] DRAW   10:15 AM   (15 min.)    [facility] DRAW   [facility] Lab Draw 12     13     14     15     16       17     18     [facility] NEW   10:15 AM   (60 min.)   Karime Jones RD   [facility] 19     20     21     22     [facility] DRAW    7:15 AM   (15 min.)    [facility] DRAW   [facility] Lab Draw     [facility] RETURN    7:35 AM   (50 min.)   Joselyn  Linda Montez PA-C   [facility] ONC INFUSION 240    8:30 AM   (240 min.)    ONCOLOGY INFUSION   [facility] 23       24     25     26     27     28     29     30 October 2017 Sunday Monday Tuesday Wednesday Thursday Friday Saturday   1     2     3     4     5     [facility] DRAW   11:30 AM   (15 min.)    [facility] DRAW   [facility] Lab Draw     [facility] RETURN   11:45 AM   (30 min.)   Albert Long MD   [facility] ONC INFUSION 240    1:00 PM   (240 min.)    ONCOLOGY INFUSION   [facility] 6     7       8     9     10     11     12     13     14       15     16     17     18     19     20     21       22     23     24     25     26     27     28       29     30     31                                      Lab Results:  Recent Results (from the past 12 hour(s))   Magnesium    Collection Time: 09/08/17  7:38 AM   Result Value Ref Range    Magnesium 2.0 1.6 - 2.3 mg/dL   CBC with platelets differential    Collection Time: 09/08/17  7:38 AM   Result Value Ref Range    WBC 6.7 4.0 - 11.0 10e9/L    RBC Count 4.58 4.4 - 5.9 10e12/L    Hemoglobin 11.6 (L) 13.3 - 17.7 g/dL    Hematocrit 36.2 (L) 40.0 - 53.0 %    MCV 79 78 - 100 fl    MCH 25.3 (L) 26.5 - 33.0 pg    MCHC 32.0 31.5 - 36.5 g/dL    RDW 22.3 (H) 10.0 - 15.0 %    Platelet Count 207 150 - 450 10e9/L    Diff Method Automated Method     % Neutrophils 51.0 %    % Lymphocytes 26.7 %    % Monocytes 17.1 %    % Eosinophils 3.9 %    % Basophils 0.8 %    % Immature Granulocytes 0.5 %    Nucleated RBCs 0 0 /100    Absolute Neutrophil 3.4 1.6 - 8.3 10e9/L    Absolute Lymphocytes 1.8 0.8 - 5.3 10e9/L    Absolute Monocytes 1.1 0.0 - 1.3 10e9/L    Absolute Eosinophils 0.3 0.0 - 0.7 10e9/L    Absolute Basophils 0.1 0.0 - 0.2 10e9/L    Abs Immature Granulocytes 0.0 0 - 0.4 10e9/L    Absolute Nucleated RBC 0.0    Comprehensive metabolic panel    Collection  Time: 09/08/17  7:38 AM   Result Value Ref Range    Sodium 131 (L) 133 - 144 mmol/L    Potassium 3.3 (L) 3.4 - 5.3 mmol/L    Chloride 89 (L) 94 - 109 mmol/L    Carbon Dioxide 33 (H) 20 - 32 mmol/L    Anion Gap 9 3 - 14 mmol/L    Glucose 109 (H) 70 - 99 mg/dL    Urea Nitrogen 22 7 - 30 mg/dL    Creatinine 1.20 0.66 - 1.25 mg/dL    GFR Estimate 63 >60 mL/min/1.7m2    GFR Estimate If Black 76 >60 mL/min/1.7m2    Calcium 9.6 8.5 - 10.1 mg/dL    Bilirubin Total 0.5 0.2 - 1.3 mg/dL    Albumin 3.4 3.4 - 5.0 g/dL    Protein Total 9.2 (H) 6.8 - 8.8 g/dL    Alkaline Phosphatase 153 (H) 40 - 150 U/L    ALT 38 0 - 70 U/L    AST 34 0 - 45 U/L

## 2017-09-08 NOTE — NURSING NOTE
Chief Complaint   Patient presents with     Port Draw     Labs drawn via Power Port - line flushed, hep locked and dressing applied for home use. VS taken. Patient checked in for next appointment.     Jaylin Hoang RN

## 2017-09-08 NOTE — MR AVS SNAPSHOT
After Visit Summary   9/8/2017    Sebas Lopez    MRN: [id number]           Patient Information     Date Of Birth          1965        Visit Information        Provider Department      9/8/2017 7:50 AM Linda Alves PA-C [facility]        Today's Diagnoses     Loss of weight    -  1    Peritoneal carcinomatosis (H)        Cancer of sigmoid colon (H)        Diarrhea, unspecified type        High output ileostomy (H)          Care Instructions    Schedule Zofran 8 mg three times/day in addition to tincture of opium and Imodium.  Will schedule appointment with dietician.           Follow-ups after your visit        Additional Services     NUTRITION REFERRAL       Your provider has referred you to: PREFERRED PROVIDERS:    Please be aware that coverage of these services is subject to the terms and limitations of your health insurance plan.  Call member services at your health plan with any benefit or coverage questions.      Please bring the following with you to your appointment:    (1) This referral request  (2) Any documents given to you regarding this referral  (3) Any specific questions you have about diet and/or food choices                  Your next 10 appointments already scheduled     Sep 11, 2017  9:00 AM CDT   (Arrive by 8:45 AM)   NEW WITH ROOM with Viola Vazquez GC,  2 114 CONSULT RM   [facility] ([facility])    [address] [place] [id number] [phone]            Sep 11, 2017 10:15 AM CDT   [facility] Draw with [facility] LAB DRAW   [facility] Lab Draw ([facility])    [address] [place] [id number] [phone]            Sep 18, 2017 10:30 AM CDT   (Arrive by 10:15 AM)   New Patient Visit with Karime Coronel RD   [facility] ([facility])    [address] [place] [address]  [address] [address]   [phone]            Sep 22, 2017  7:15 AM CDT   [facility] with [facility]   [facility] ([facility])    [address] [place] [phone]   [phone]            Sep 22, 2017  7:50 AM CDT   (Arrive by 7:35 AM)   Return Visit with Linda Alves PA-C   [facility] ([facility])    [address] [place] [phone]   [phone]            Sep 22, 2017  8:30 AM CDT   Infusion 240 with [facility] ONCOLOGY INFUSION, UC 20 ATC   [facility] ([facility])    [address] [place] [phone]   [phone]            Oct 05, 2017 11:30 AM CDT   [facility] Draw with [facility] DRAW   [facility] ([facility])    [address] [place] [phone]   [phone]            Oct 05, 2017 12:00 PM CDT   (Arrive by 11:45 AM)   Return Visit with Albert Long MD   [facility] ([facility])    [address] [place] [phone]   [phone]              Who to contact     If you have questions or need follow up information about today's clinic visit or your schedule please contact [facility] directly at [phone].  Normal or non-critical lab and imaging results will be communicated to you by MyChart, letter or phone within 4 business days after the clinic has received the results. If you do not hear from us within 7 days, please contact the clinic through MyChart or phone. If you have a critical or abnormal lab result, we will notify you by phone as soon as possible.  Submit refill requests through [employer] or call your pharmacy and they will forward the refill request to us. Please allow 3 business days for your  refill to be completed.          Additional Information About Your Visit        [employer] Information     [employer] gives you secure access to your electronic health record. If you see a primary care provider, you can also send messages to your care team and make appointments. If you have questions, please call your primary care clinic.  If you do not have a primary care provider, please call [phone] and they will assist you.        Care EveryWhere ID     This is your Care EveryWhere ID. This could be used by other organizations to access your [facility] medical records  [id number]        Your Vitals Were     Pulse Temperature Respirations Pulse Oximetry BMI (Body Mass Index)       101 97.7  F (36.5  C) (Oral) 18 98% 23 kg/m2        Blood Pressure from Last 3 Encounters:   09/08/17 126/86   08/25/17 120/75   08/11/17 114/75    Weight from Last 3 Encounters:   09/08/17 70.7 kg (155 lb 12.8 oz)   08/25/17 73.6 kg (162 lb 4.1 oz)   08/11/17 77 kg (169 lb 12.8 oz)              We Performed the Following     Clostridium difficile toxin B PCR     NUTRITION REFERRAL          Today's Medication Changes          These changes are accurate as of: 9/8/17 12:55 PM.  If you have any questions, ask your nurse or doctor.               Start taking these medicines.        Dose/Directions    Psyllium 51.7 % Pack   Commonly known as:  METAMUCIL FIBER   Used for:  Diarrhea, unspecified type   Started by:  Linda Alves PA-C        Dose:  1 packet   Take 1 packet by mouth 3 times daily   Quantity:  90 each   Refills:  3         These medicines have changed or have updated prescriptions.        Dose/Directions    ondansetron 8 MG tablet   Commonly known as:  ZOFRAN   This may have changed:    - when to take this  - reasons to take this   Used for:  Peritoneal carcinomatosis (H), Cancer of sigmoid colon (H)   Changed by:  Linda Alves PA-C        Dose:  8 mg   Take 1 tablet (8 mg) by mouth every 8 hours   Quantity:  90  tablet   Refills:  2            Where to get your medicines      These medications were sent to [place], [place] - [address]  [address], [facility] [address]    Hours:  TRANSPLANT PHONE NUMBER [phone] Phone:  [phone]     ondansetron 8 MG tablet    Psyllium 51.7 % Pack         Some of these will need a paper prescription and others can be bought over the counter.  Ask your nurse if you have questions.     Bring a paper prescription for each of these medications     opium tincture tincture                Primary Care Provider Office Phone # Fax #    Jaguar Becker [phone]       [facility] [address] [place] [phone]        Equal Access to Services     SARAH DUNN : Hadii karrie reddy hadasho Soomaali, waaxda luqadaha, qaybta kaalmada adeegyada, waxay stella haymino willis. So [facility] [phone].    ATENCIÓN: Si habla español, tiene a cid disposición servicios gratuitos de asistencia lingüística. Llame al [phone].    We comply with applicable federal civil rights laws and [place] laws. We do not discriminate on the basis of race, color, national origin, age, disability sex, sexual orientation or gender identity.            Thank you!     Thank you for choosing [facility]  for your care. Our goal is always to provide you with excellent care. Hearing back from our patients is one way we can continue to improve our services. Please take a few minutes to complete the written survey that you may receive in the mail after your visit with us. Thank you!             Your Updated Medication List - Protect others around you: Learn how to safely use, store and throw away your medicines at www.disposemymeds.org.          This list is accurate as of: 9/8/17 12:55 PM.  Always use your most recent med list.                   Brand Name Dispense Instructions for use Diagnosis     cholestyramine 4 G Packet    QUESTRAN     Take 2 packets by mouth 3 times daily (with meals)        diphenoxylate-atropine 2.5-0.025 MG per tablet    LOMOTIL    40 tablet    Take 1-2 tablets by mouth 4 times daily as needed for diarrhea    Diarrhea due to drug       IRON SUPPLEMENT PO           loperamide 2 MG capsule    IMODIUM     Take 2 mg by mouth 4 times daily as needed for diarrhea        LORazepam 0.5 MG tablet    ATIVAN    30 tablet    Take 1 tablet (0.5 mg) by mouth every 4 hours as needed (Anxiety, Nausea/Vomiting or Sleep)    Peritoneal carcinomatosis (H), Cancer of sigmoid colon (H)       ondansetron 8 MG tablet    ZOFRAN    90 tablet    Take 1 tablet (8 mg) by mouth every 8 hours    Peritoneal carcinomatosis (H), Cancer of sigmoid colon (H)       opium tincture tincture     72 mL    Take 0.6 mLs (6 mg) by mouth 4 times daily    Diarrhea, unspecified type       prochlorperazine 10 MG tablet    COMPAZINE    30 tablet    Take 1 tablet (10 mg) by mouth every 6 hours as needed (Nausea/Vomiting)    Peritoneal carcinomatosis (H), Cancer of sigmoid colon (H)       Psyllium 51.7 % Pack    METAMUCIL FIBER    90 each    Take 1 packet by mouth 3 times daily    Diarrhea, unspecified type

## 2017-09-08 NOTE — MR AVS SNAPSHOT
After Visit Summary   9/8/2017    Sebas Lopez    MRN: [id number]           Patient Information     Date Of Birth          1965        Visit Information        Provider Department      9/8/2017 8:30 AM UC 20 ATC;  ONCOLOGY INFUSION [facility]        Today's Diagnoses     Peritoneal carcinomatosis (H)    -  1    Cancer of sigmoid colon (H)          Care Instructions    Contact Numbers    [facility] Main Line: [phone]  [facility] Triage:  [phone]    Call triage with chills and/or temperature greater than or equal to 100.5, uncontrolled nausea/vomiting, diarrhea, constipation, dizziness, shortness of breath, chest pain, bleeding, unexplained bruising, or any new/concerning symptoms, questions/concerns.     If you are having any concerning symptoms or wish to speak to a provider before your next infusion visit, please call your care coordinator or triage to notify them so we can adequately serve you.       After Hours: [phone]    If after hours, weekends, or holidays, call main hospital  and ask for Oncology doctor on call.     Schedule Zofran 8 mg three times/day in addition to tincture of opium and Imodium for diarrhea.  Will schedule appointment with dietician.   Avoid foods that can make the stool loose, such as raw fruits and vegetables, garlic, onions, milk, alcohol, spicy foods, and iced drinks.        September 2017 Sunday Monday Tuesday Wednesday Thursday Friday Saturday                            1     2       3     4     5     6     7     8     [facility] LAB DRAW    7:15 AM   (15 min.)   [facility] LAB DRAW   [facility] Lab Draw     [facility] RETURN    7:35 AM   (50 min.)   Linda Alves PA-C   [facility] ONC INFUSION 240    8:30 AM   (240 min.)    ONCOLOGY INFUSION   [facility] 9       10     11     [facility] NEW WITH ROOM    8:45 AM   (75 min.)   Viola Vazquez GC   [facility]   10:15 AM   (15 min.)    [facility] DRAW   [facility] Draw 12     13     14     15     16       17     18     [facility] NEW   10:15 AM   (60 min.)   Karime Jones RD   [facility] 19     20     21     22     [facility] DRAW    7:15 AM   (15 min.)    [facility]   [facility]     [facility] RETURN    7:35 AM   (50 min.)   L[facility] Alves [facility]-C   [facility]    8:30 AM   (240 min.)   [facility] ONCOLOGY INFUSION   [facility] 23       24     25     26     27     28     29     30 October 2017 Sunday Monday Tuesday Wednesday Thursday Friday Saturday   1     2     3     4     5     P [facility] DRAW   11:30 AM   (15 min.)    [facility] LAB DRAW   [facility] Lab Draw     [facility] RETURN   11:45 AM   (30 min.)   Albert Long MD   [facility]    1:00 PM   (240 min.)   [facility] ONCOLOGY INFUSION   [facility] 6     7       8     9     10     11     12     13     14       15     16     17     18     19     20     21       22     23     24     25     26     27     28       29     30     31                                      Lab Results:  Recent Results (from the past 12 hour(s))   Magnesium    Collection Time: 09/08/17  7:38 AM   Result Value Ref Range    Magnesium 2.0 1.6 - 2.3 mg/dL   CBC with platelets differential    Collection Time: 09/08/17  7:38 AM   Result Value Ref Range    WBC 6.7 4.0 - 11.0 10e9/L    RBC Count 4.58 4.4 - 5.9 10e12/L    Hemoglobin 11.6 (L) 13.3 - 17.7 g/dL    Hematocrit 36.2 (L) 40.0 - 53.0 %    MCV 79 78 - 100 fl    MCH 25.3 (L) 26.5 - 33.0 pg    MCHC 32.0 31.5 - 36.5 g/dL    RDW 22.3 (H) 10.0 - 15.0 %    Platelet Count 207 150 - 450 10e9/L    Diff Method Automated Method     % Neutrophils 51.0 %    % Lymphocytes 26.7 %    % Monocytes 17.1 %    % Eosinophils 3.9 %    %  Basophils 0.8 %    % Immature Granulocytes 0.5 %    Nucleated RBCs 0 0 /100    Absolute Neutrophil 3.4 1.6 - 8.3 10e9/L    Absolute Lymphocytes 1.8 0.8 - 5.3 10e9/L    Absolute Monocytes 1.1 0.0 - 1.3 10e9/L    Absolute Eosinophils 0.3 0.0 - 0.7 10e9/L    Absolute Basophils 0.1 0.0 - 0.2 10e9/L    Abs Immature Granulocytes 0.0 0 - 0.4 10e9/L    Absolute Nucleated RBC 0.0    Comprehensive metabolic panel    Collection Time: 09/08/17  7:38 AM   Result Value Ref Range    Sodium 131 (L) 133 - 144 mmol/L    Potassium 3.3 (L) 3.4 - 5.3 mmol/L    Chloride 89 (L) 94 - 109 mmol/L    Carbon Dioxide 33 (H) 20 - 32 mmol/L    Anion Gap 9 3 - 14 mmol/L    Glucose 109 (H) 70 - 99 mg/dL    Urea Nitrogen 22 7 - 30 mg/dL    Creatinine 1.20 0.66 - 1.25 mg/dL    GFR Estimate 63 >60 mL/min/1.7m2    GFR Estimate If Black 76 >60 mL/min/1.7m2    Calcium 9.6 8.5 - 10.1 mg/dL    Bilirubin Total 0.5 0.2 - 1.3 mg/dL    Albumin 3.4 3.4 - 5.0 g/dL    Protein Total 9.2 (H) 6.8 - 8.8 g/dL    Alkaline Phosphatase 153 (H) 40 - 150 U/L    ALT 38 0 - 70 U/L    AST 34 0 - 45 U/L                 Follow-ups after your visit        Your next 10 appointments already scheduled     Sep 11, 2017  9:00 AM CDT   (Arrive by 8:45 AM)   NEW WITH ROOM with Viola Vazquez GC,  2 114 [facility]ULT [facility]    [address] [place] [address] [phone]            Sep 11, 2017 10:15 AM CDT   [facility] Draw with [facility] DRAW   [facility]    [address] [place] [address] [phone]            Sep 18, 2017 10:30 AM CDT   (Arrive by 10:15 AM)   New Patient Visit with Karime Coronel RD   [facility] ([facility])    [address] [place] [address]   [phone]            Sep 22, 2017  7:15 AM ERIN Rivera  Lab Draw with  [facility] DRAW   [facility]    [address] [address]   [phone]            Sep 22, 2017  7:50 AM CDT   (Arrive by 7:35 AM)   Return Visit with Linda Alves PA-C   [facility] ([facility])    [address] [place] [id number] [phone]            Sep 22, 2017  8:30 AM CDT   Infusion 240 with UC ONCOLOGY INFUSION, UC 20 ATC   [facility] ([facility])    [address] [place] [id number] [phone]            Oct 05, 2017 11:30 AM CDT   [facility] Lab Draw with  [facility] DRAW   [facility] ([facility])    [address] [place] [address] [phone]            Oct 05, 2017 12:00 PM CDT   (Arrive by 11:45 AM)   Return Visit with Albert Long MD   [facility] ([facility])    [address] [place] [address] [phone]              Who to contact     If you have questions or need follow up information about today's clinic visit or your schedule please contact [facility] directly at [phone].  Normal or non-critical lab and imaging results will be communicated to you by MyChart, letter or phone within 4 business days after the clinic has received the results. If you do not hear from us within 7 days, please contact the clinic through MyChart or phone. If you have a critical or abnormal lab result, we will notify you by phone as soon as possible.  Submit refill requests through [employer] or call your pharmacy and they will forward the refill request to us. Please allow 3 business days for your refill to be completed.          Additional Information About Your Visit        MyChart Information      [employer] gives you secure access to your electronic health record. If you see a primary care provider, you can also send messages to your care team and make appointments. If you have questions, please call your primary care clinic.  If you do not have a primary care provider, please call [phone] and they will assist you.        Care EveryWhere ID     This is your Care EveryWhere ID. This could be used by other organizations to access your [facility] medical records  [id number]         Blood Pressure from Last 3 Encounters:   09/08/17 126/86   08/25/17 120/75   08/11/17 114/75    Weight from Last 3 Encounters:   09/08/17 70.7 kg (155 lb 12.8 oz)   08/25/17 73.6 kg (162 lb 4.1 oz)   08/11/17 77 kg (169 lb 12.8 oz)              We Performed the Following     CBC with platelets differential     Comprehensive metabolic panel     Magnesium          Today's Medication Changes          These changes are accurate as of: 9/8/17  8:58 AM.  If you have any questions, ask your nurse or doctor.               These medicines have changed or have updated prescriptions.        Dose/Directions    ondansetron 8 MG tablet   Commonly known as:  ZOFRAN   This may have changed:    - when to take this  - reasons to take this   Used for:  Peritoneal carcinomatosis (H), Cancer of sigmoid colon (H)   Changed by:  Linda Alves PA-C        Dose:  8 mg   Take 1 tablet (8 mg) by mouth every 8 hours   Quantity:  90 tablet   Refills:  2            Where to get your medicines      These medications were sent to [address] [address]    Hours:  TRANSPLANT PHONE NUMBER [phone] Phone:  [phone]     ondansetron 8 MG tablet         Some of these will need a paper prescription and others can be bought over the counter.  Ask your nurse if you have questions.     Bring a paper prescription for each of these medications      opium tincture tincture                Primary Care Provider Office Phone # Fax #    Jaguar RADHA Becker [phone]       [facility] [address]  [facility] [address]        Equal Access to Services     SARAH DUNN : Cristina karrie reddy patrico Soyolandaali, waaxda luqadaha, qaybta kaalmada adeegyada, bhargav lawrence laNelamino willis. So [facility] [phone].    ATENCIÓN: Si habla español, tiene a cid disposición servicios gratuitos de asistencia lingüística. Llame al [phone].    We comply with applicable federal civil rights laws and [place] laws. We do not discriminate on the basis of race, color, national origin, age, disability sex, sexual orientation or gender identity.            Thank you!     Thank you for choosing [facility]  for your care. Our goal is always to provide you with excellent care. Hearing back from our patients is one way we can continue to improve our services. Please take a few minutes to complete the written survey that you may receive in the mail after your visit with us. Thank you!             Your Updated Medication List - Protect others around you: Learn how to safely use, store and throw away your medicines at www.disposemymeds.org.          This list is accurate as of: 9/8/17  8:58 AM.  Always use your most recent med list.                   Brand Name Dispense Instructions for use Diagnosis    cholestyramine 4 G Packet    QUESTRAN     Take 2 packets by mouth 3 times daily (with meals)        diphenoxylate-atropine 2.5-0.025 MG per tablet    LOMOTIL    40 tablet    Take 1-2 tablets by mouth 4 times daily as needed for diarrhea    Diarrhea due to drug       IRON SUPPLEMENT PO           loperamide 2 MG capsule    IMODIUM     Take 2 mg by mouth 4 times daily as needed for diarrhea        LORazepam 0.5 MG tablet    ATIVAN    30 tablet    Take 1 tablet (0.5 mg) by mouth every 4 hours as needed (Anxiety, Nausea/Vomiting or Sleep)    Peritoneal  carcinomatosis (H), Cancer of sigmoid colon (H)       ondansetron 8 MG tablet    ZOFRAN    90 tablet    Take 1 tablet (8 mg) by mouth every 8 hours    Peritoneal carcinomatosis (H), Cancer of sigmoid colon (H)       opium tincture tincture     72 mL    Take 0.6 mLs (6 mg) by mouth 4 times daily    Diarrhea, unspecified type       prochlorperazine 10 MG tablet    COMPAZINE    30 tablet    Take 1 tablet (10 mg) by mouth every 6 hours as needed (Nausea/Vomiting)    Peritoneal carcinomatosis (H), Cancer of sigmoid colon (H)

## 2017-09-08 NOTE — PROGRESS NOTES
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 Imaging performed at that time showing sigmoid wall thickening with adjacent mesenteric lymphadenopathy with free fluid near the abdominal wall mess from prior hernia surgery. He underwent a colonoscopy that showed an obstructing sigmoid colon mass. Biopsy of the mass showing sigmoid adenocarcinoma. He then developed obstructive symptoms and underwent an exploratory laparotomy on 7/10/2017. During that procedure, he was found to have diffuse peritoneal carcinomatosis. Extensive lysis of adhesions was performed and a small bowel resection was performed with end ileostomy. The biopsies from multiple large peritoneal metastases were also positive for metastatic adenocarcinoma. He started FOLFOX (5-FU, oxalipaltin, leucovorin) on 8/11/17. He comes in today for routine follow up prior to cycle 3.     INTERVAL HISTORY:   Patient reports that he has been keeping track of his calorie intake and is getting in 0100-1712 calories/day. He has been taking the tincture of opium qid and Imodium 8/day. He is emptying his ostomy bag 10-12 times per day. The consistency of his stool is mildly improved, but still loose. He is taking in 1-1.5 gallons fluids/day. He reports that his stools actually seem to slow down with the infusions. He had one day of tingling in his fingers on day 2 and the cold sensitivity lasted for 5-6 days. He denies any neuropathy in his feet. He denies any mouth or lip sores this cycle. He is taking Ativan at night on the first week while he is attached to the pump. He has noticed occasional mucus from his rectum. He denies other concerns.    Current Outpatient Prescriptions   Medication Sig Dispense Refill     opium tincture tincture Take 0.6 mLs  (6 mg) by mouth 4 times daily 72 mL 0     diphenoxylate-atropine (LOMOTIL) 2.5-0.025 MG per tablet Take 1-2 tablets by mouth 4 times daily as needed for diarrhea 40 tablet 1     LORazepam (ATIVAN) 0.5 MG tablet Take 1 tablet (0.5 mg) by mouth every 4 hours as needed (Anxiety, Nausea/Vomiting or Sleep) 30 tablet 2     prochlorperazine (COMPAZINE) 10 MG tablet Take 1 tablet (10 mg) by mouth every 6 hours as needed (Nausea/Vomiting) 30 tablet 2     ondansetron (ZOFRAN) 8 MG tablet Take 1 tablet (8 mg) by mouth every 8 hours as needed (Nausea/Vomiting) 10 tablet 2     cholestyramine (QUESTRAN) 4 G Packet Take 2 packets by mouth 3 times daily (with meals)       loperamide (IMODIUM) 2 MG capsule Take 2 mg by mouth 4 times daily as needed for diarrhea       Ferrous Sulfate (IRON SUPPLEMENT PO)           No Known Allergies    PHYSICAL EXAMINATION  /86 (BP Location: Right arm, Patient Position: Sitting, Cuff Size: Adult Regular)  Pulse 101  Temp 97.7  F (36.5  C) (Oral)  Resp 18  Wt 70.7 kg (155 lb 12.8 oz)  SpO2 98%  BMI 23 kg/m2  Constitutional: Alert, oriented male in no visible distress.  Eyes: PERRL. Anicteric sclerae.  ENT/Mouth: OM moist and pink without lesions or thrush.  CV: RRR.  Resp: CTAB throughout  Abdomen: Soft, non-tender, non-distended. Bowel sounds present. No masses appreciated. No organomegaly noted. Ostomy at RLQ draining liquid stool with very little form.  Extremities: No lower extremity edema appreciated.  Skin: Warm, dry. No bruising or petechiae noted.  Lymph: No cervical or supraclavicular lymphadenopathy appreciated.   Neuro: CN II-XII grossly intact.    LABS:   9/8/2017 07:38   Sodium 131 (L)   Potassium 3.3 (L)   Chloride 89 (L)   Carbon Dioxide 33 (H)   Urea Nitrogen 22   Creatinine 1.20   GFR Estimate 63   GFR Estimate If Black 76   Calcium 9.6   Anion Gap 9   Magnesium 2.0   Albumin 3.4   Protein Total 9.2 (H)   Bilirubin Total 0.5   Alkaline Phosphatase 153 (H)   ALT 38   AST 34    Glucose 109 (H)   WBC 6.7   Hemoglobin 11.6 (L)   Hematocrit 36.2 (L)   Platelet Count 207   RBC Count 4.58   MCV 79   MCH 25.3 (L)   MCHC 32.0   RDW 22.3 (H)   Diff Method Automated Method   % Neutrophils 51.0   % Lymphocytes 26.7   % Monocytes 17.1   % Eosinophils 3.9   % Basophils 0.8   % Immature Granulocytes 0.5   Nucleated RBCs 0   Absolute Neutrophil 3.4   Absolute Lymphocytes 1.8   Absolute Monocytes 1.1   Absolute Eosinophils 0.3   Absolute Basophils 0.1   Abs Immature Granulocytes 0.0   Absolute Nucleated RBC 0.0     IMPRESSION/PLAN:  Sebas Lopez is a 52 year old male with history of ulcerative colitis, now with sigmoid adenocarcinoma with peritoneal carcinomatosis, status-post ex-lap on 7/10 with extensive lysis of adhesions and small-bowel resection with end ileostomy.    Sigmoid cancer with peritoneal carcinomatosis:  He started on FOLFOX on 8/11/17. He appears to have tolerated the first 2 cycles well with some mild cold sensitivity that has since resolved. He had brief tingling in his fingers with cycle 2. He will start cycle 3 today. He will follow up in clinic every 2 weeks prior to each cycle.   - Plan to re-image after 4-6 cycles and return to see Dr. Dudley  - He may potentially be able to go on to cytoreductive surgery with [facility] as mentioned in Dr. Dudley's note  - Seeing genetics next week. Discussed bringing a list of family member's serious medical issues to appointment, particularly any cancers. Discussed that he does not have to have genetic testing if he does not wish to have it, but the visit is helpful to discuss if there are any potential concerns for a genetic link to his diagnosis.     High-output ostomy: Chronic since surgery. Currently on Imodium up to 8 tablets/day and tincture of opium 6 mg qid. He previously did not find a benefit from Lomotil. Will try adding in Zofran 8 mg tid scheduled, as this medication often causes constipation. He will continue to push fluids  orally. He appears mildly dehydrated today and will receive 1L IV NS today. He also has cholestyramine available at home. I will check with Dr. Dudley to see what other ideas he has for management.    Iron deficiency anemia: secondary to chronic blood loss. Received Infed 8/8. Hgb is trending up today.  Will recheck iron studies in early November.     Weight loss: Patient will continue to work on a high calorie diet. He is currently getting in 5210-4650 calories/day. He will meet with a dietician next week to discuss diet management to help with the weight loss and high output ostomy.     Hypokalemia: Better than last time, but still mildly low. Will replace IV today. Secondary to high output ostomy. He was also previously given a list of high potassium foods.    Linda Alves PA-C  [facility]  [address] [place] [id number] [phone]    Addendum: Discussed ostomy output with Dr. Dudley. Will plan to add in Metamucil tid and check for C.diff.

## 2017-09-08 NOTE — PROGRESS NOTES
Infusion Nursing Note:  Sebas Lopez presents today for Cycle 3, day 1 Oxaliplatin, Leucovorin, Fluorouracil bolus and fluorouracil pump connection.    Patient seen by provider today: Yes: ANNAMARIE Mckenna    Note: Patient presents to clinic today feeling well with no questions.  Per PA, advised pt to start Metamucil TID; patient verbalized understanding and states he has supply at home.    Intravenous Access:  Implanted Port.    Treatment Conditions:  Lab Results   Component Value Date    HGB 11.6 09/08/2017     Lab Results   Component Value Date    WBC 6.7 09/08/2017      Lab Results   Component Value Date    ANEU 3.4 09/08/2017     Lab Results   Component Value Date     09/08/2017      Lab Results   Component Value Date     09/08/2017                   Lab Results   Component Value Date    POTASSIUM 3.3 09/08/2017           Lab Results   Component Value Date    MAG 2.0 09/08/2017            Lab Results   Component Value Date    CR 1.20 09/08/2017                   Lab Results   Component Value Date    ANNAMARIE 9.6 09/08/2017                Lab Results   Component Value Date    BILITOTAL 0.5 09/08/2017           Lab Results   Component Value Date    ALBUMIN 3.4 09/08/2017                    Lab Results   Component Value Date    ALT 38 09/08/2017           Lab Results   Component Value Date    AST 34 09/08/2017     Results reviewed, labs MET treatment parameters, ok to proceed with treatment.    Post Infusion Assessment:  Patient tolerated infusion without incident.  Blood return noted pre and post infusion.  Site patent and intact, free from redness, edema or discomfort.  No evidence of extravasations.    Fluorouracil pump connected per protocol.  Connections taped, temperature sensor taped to skin.  Pump to infuse at 5ml/hr for 46 hours.  Disconnect time of 1100 on 9/10/2017 called to [facility].  Spoke with Rachael.    Discharge Plan:   Prescription refills given for Zofran and Opium  Tincture.  Discharge instructions reviewed with: Patient.  Patient and/or family verbalized understanding of discharge instructions and all questions answered.  Copy of AVS reviewed with patient and/or family.  Patient will return 9/22/2017 for next appointment.  Departure Mode: Ambulatory.    Kaylen Freire RN

## 2017-09-18 ENCOUNTER — ONCOLOGY VISIT (OUTPATIENT)
Dept: ONCOLOGY | Facility: CLINIC | Age: 52
End: 2017-09-18
Attending: DIETITIAN, REGISTERED
Payer: COMMERCIAL

## 2017-09-18 DIAGNOSIS — C78.6 PERITONEAL CARCINOMATOSIS (H): Primary | ICD-10-CM

## 2017-09-18 PROCEDURE — 97802 MEDICAL NUTRITION INDIV IN: CPT | Mod: ZF | Performed by: DIETITIAN, REGISTERED

## 2017-09-18 NOTE — MR AVS SNAPSHOT
After Visit Summary   9/18/2017    Sebas Lopez    MRN: [id number]           Patient Information     Date Of Birth          1965        Visit Information        Provider Department      9/18/2017 10:30 AM Karime Jones RD [facility]        Today's Diagnoses     Peritoneal carcinomatosis (H)    -  1       Follow-ups after your visit        Your next 10 appointments already scheduled     Sep 22, 2017  7:15 AM CDT   [facility] Lab Draw with [facility] MASONIC LAB DRAW   [facility] Lab Draw ([facility])    [address] [place] [address]   [phone]            Sep 22, 2017  7:50 AM CDT   (Arrive by 7:35 AM)   Return Visit with Linda Alves PA-C   [facility] ([facility])    [address] [place] [address]   [phone]            Sep 22, 2017  8:30 AM CDT   Infusion 240 with UC ONCOLOGY INFUSION, UC 20 ATC   [facility])    [address] [place] [address]   [phone]            Oct 05, 2017 11:30 AM CDT   M[facility]ic Lab Draw with [facility] LAB DRAW   [facility] Lab Draw ([facility])    [address] [place] [address]   [phone]            Oct 05, 2017 12:00 PM CDT   (Arrive by 11:45 AM)   Return Visit with Albert Long MD   [facility])    [address] [place] [address]   [phone]            Oct 05, 2017  1:00 PM CDT   Infusion 240 with UC ONCOLOGY INFUSION, UC 10 ATC   [facility])    [address] [place] [address]   [phone]              Who to contact     If you have questions or need follow up information about  today's clinic visit or your schedule please contact [facility] directly at [phone].  Normal or non-critical lab and imaging results will be communicated to you by MyChart, letter or phone within 4 business days after the clinic has received the results. If you do not hear from us within 7 days, please contact the clinic through [employer] or phone. If you have a critical or abnormal lab result, we will notify you by phone as soon as possible.  Submit refill requests through [employer] or call your pharmacy and they will forward the refill request to us. Please allow 3 business days for your refill to be completed.          Additional Information About Your Visit        [employer] Information     [employer] gives you secure access to your electronic health record. If you see a primary care provider, you can also send messages to your care team and make appointments. If you have questions, please call your primary care clinic.  If you do not have a primary care provider, please call [phone] and they will assist you.        Care EveryWhere ID     This is your Care EveryWhere ID. This could be used by other organizations to access your [place] medical records  [id number]         Blood Pressure from Last 3 Encounters:   09/08/17 126/86   08/25/17 120/75   08/11/17 114/75    Weight from Last 3 Encounters:   09/08/17 70.7 kg (155 lb 12.8 oz)   08/25/17 73.6 kg (162 lb 4.1 oz)   08/11/17 77 kg (169 lb 12.8 oz)              We Performed the Following     MNT INDIVIDUAL INITIAL EA 15 MIN        Primary Care Provider Office Phone # Fax #    Jaguar Becker [phone]       [place] [facility] [address]  [facility] [address]        Equal Access to Services     MELVIN DUNN : Hadii karrie Funk, wahayderda luqadaha, qaybta kaalmada bhargav saucedo. So [facility] [phone].    ATENCIÓN: Si habla español, tiene a cid disposición servicios gratuitos de  asistencia lingüística. Kenan al [phone].    We comply with applicable federal civil rights laws and [place] laws. We do not discriminate on the basis of race, color, national origin, age, disability sex, sexual orientation or gender identity.            Thank you!     Thank you for choosing [facility]  for your care. Our goal is always to provide you with excellent care. Hearing back from our patients is one way we can continue to improve our services. Please take a few minutes to complete the written survey that you may receive in the mail after your visit with us. Thank you!             Your Updated Medication List - Protect others around you: Learn how to safely use, store and throw away your medicines at www.disposemymeds.org.          This list is accurate as of: 9/18/17  2:00 PM.  Always use your most recent med list.                   Brand Name Dispense Instructions for use Diagnosis    cholestyramine 4 G Packet    QUESTRAN     Take 2 packets by mouth 3 times daily (with meals)        diphenoxylate-atropine 2.5-0.025 MG per tablet    LOMOTIL    40 tablet    Take 1-2 tablets by mouth 4 times daily as needed for diarrhea    Diarrhea due to drug       IRON SUPPLEMENT PO           loperamide 2 MG capsule    IMODIUM     Take 2 mg by mouth 4 times daily as needed for diarrhea        LORazepam 0.5 MG tablet    ATIVAN    30 tablet    Take 1 tablet (0.5 mg) by mouth every 4 hours as needed (Anxiety, Nausea/Vomiting or Sleep)    Peritoneal carcinomatosis (H), Cancer of sigmoid colon (H)       ondansetron 8 MG tablet    ZOFRAN    90 tablet    Take 1 tablet (8 mg) by mouth every 8 hours    Peritoneal carcinomatosis (H), Cancer of sigmoid colon (H)       opium tincture tincture     72 mL    Take 0.6 mLs (6 mg) by mouth 4 times daily    Diarrhea, unspecified type       prochlorperazine 10 MG tablet    COMPAZINE    30 tablet    Take 1 tablet (10 mg) by mouth every 6 hours as needed  (Nausea/Vomiting)    Peritoneal carcinomatosis (H), Cancer of sigmoid colon (H)       Psyllium 51.7 % Pack    METAMUCIL FIBER    90 each    Take 1 packet by mouth 3 times daily    Diarrhea, unspecified type

## 2017-09-18 NOTE — LETTER
"9/18/2017       RE: Sebas Lopez  [address] [place] [address]     Dear Colleague,    Thank you for referring your patient, Sebas Lopez, to the [facility]. Please see a copy of my visit note below.    CLINICAL NUTRITION SERVICES - ASSESSMENT NOTE    REASON FOR ASSESSMENT  Sebas Lopez is a 52 year old male seen by the dietitian for Medical Nutrition Therapy related to colon cancer referred by Linda Alves PA-C  Pt accompanied by self    NUTRITION HISTORY  Factors affecting nutrition intake include:diarrhea    Current diet: general (appears high in fat and spice)  Current intake/appetite: Good    Sebas reports that despite tracking his calories on MFP, aiming for >3000/day, he continues to lose weight.    He reports that his ileostomy output can be as high as 70 oz /day.   He has been taking Cholestyramine and Metamucil, however, does not feel as though these aids are working.      He has received some diet education from an RD in [place], however, is looking for more direction.      He admits to eating a diet high in fried foods, spice and will have up to 3 cans of beer daily.  He also drinks Gatorade and takes WHO oral hydration formula to ensure adequate hydration and electrolyte replacement.      He does notice that when he eats fried foods, he has more oily output. This may be an indication of fat malabsorption.      Diet recall:  Breakfast:  Apple w/o skin, banana, 1-2 eggs, Ensure plus  Lunch:  Juicing - spinach, carrots, apples, cucumbers (low pulp)  Dinner:  Chicken, beef, hamburger, chicken kiev, fried chicken  Pm snack  Ensure  Beverages:   >64 oz Gatroade, WHO, 2-3 beers/day    ANTHROPOMETRICS  Height: 69\"   Weight: 155 lbs/70kg  BMI: 22.7  Weight Status:  Normal BMI  IBW: 160 lbs  % IBW: 96%  Weight History: down 11 lb x past 6 week.   Wt Readings from Last 8 Encounters:   09/08/17 70.7 kg (155 lb 12.8 oz)   08/25/17 73.6 kg (162 lb 4.1 oz)   08/11/17 77 kg " (169 lb 12.8 oz)   08/10/17 78.8 kg (173 lb 12.8 oz)   08/03/17 78.7 kg (173 lb 8 oz)   07/31/17 75.7 kg (166 lb 14.4 oz)       LABS  Labs reviewed    MEDICATIONS/VITAMINS/MINERALS  Medications reviewed    PROCEDURES WITH NUTRITIONAL IMPLICATIONS  Chemotherapy - FOLFOX    ASSESSED NUTRITION NEEDS:  Estimated Energy Needs: 8232-6506 kcals (35-40 Kcal/Kg)  Justification: repletion  Estimated Protein Needs: 90 grams protein (1.2 g pro/Kg)  Justification: Repletion  Estimated Fluid Needs: 3000  mL  Justification: increased needs with ileostomy    MALNUTRITION:  % Weight Loss:  > 7.5% in 3 months (severe malnutrition)  % Intake:  Decreased intake does not meet criteria for malnutrition   Subcutaneous Fat Loss:  None observed  Muscle Loss:  Temporal region moderate depletion, Clavicle bone region moderate depletion and Anterior thigh region moderate depletion  Fluid Retention:  None noted    Malnutrition Diagnosis: Non-Severe malnutrition  In Context of:  Chronic illness or disease    NUTRITION DIAGNOSIS:  Altered GI function related to colon surgery with ileostomy as evidenced by ongoing loose stools.     INTERVENTIONS  Recommendations / Nutrition Prescription  1. 2500kcal/day via small frequent, bland, low residue meals  2. >3000mL fluids (Gatroade, WHO hydration formula) daily  Nutrition Education    Provided written & verbal education on:   - Ways to maximize kcal and protein intake. Discussed calorie and protein needs for maintenance of weight and nutrition status.  Advised pt to aim for 2500kcal and 90g protein via 5-6 small frequent meals.   - Coping with barriers - as chemotherapy progresses, eating may become more difficult and discussed ways to cope with this.  - Discussed diet guidelines for coping with ileostomy.  Strongly encouraged pt to avoid all fried foods.  Advised pt to avoid spicy foods and alcohol as these foods/beverages may exacerbate loose stools.   - Discussed potential need for PERT if loose/oily  stools persist after diet modifications.  Pt may need Creon or Zenpep to help optimize fat/protein absorption.         Provided pt with corresponding education materials on:  -Diet Guidelines for Ileostomy, meal plan  -Tips for Increasing calories in diet      Pt verbalize understanding of materials provided during consult.   Patient Understanding: good  Expected Compliance: good     Goals  1.  Aim for 5-6 small frequent meals  2.  Aim for 2500-3000kcal and 90g protein/day  3. Weight maintenance/weight repletion towards IBW 160lbs      Follow-Up Plans: Pt has RD contact information for questions.      MONITORING AND EVALUATION:  -Food intake  -Liquid meal replacement or supplement  -Weight trends  -GI (ileostomy output)      Time spent with patient: 60 minutes.  Karime Coronel RD, LD

## 2017-09-18 NOTE — PROGRESS NOTES
"CLINICAL NUTRITION SERVICES - ASSESSMENT NOTE    REASON FOR ASSESSMENT  Sebas Lopez is a 52 year old male seen by the dietitian for Medical Nutrition Therapy related to colon cancer referred by Linda Alves PA-C  Pt accompanied by self    NUTRITION HISTORY  Factors affecting nutrition intake include:diarrhea    Current diet: general (appears high in fat and spice)  Current intake/appetite: Sean Mullen reports that despite tracking his calories on MFP, aiming for >3000/day, he continues to lose weight.    He reports that his ileostomy output can be as high as 70 oz /day.   He has been taking Cholestyramine and Metamucil, however, does not feel as though these aids are working.      He has received some diet education from an RD in [place], however, is looking for more direction.      He admits to eating a diet high in fried foods, spice and will have up to 3 cans of beer daily.  He also drinks Gatorade and takes WHO oral hydration formula to ensure adequate hydration and electrolyte replacement.      He does notice that when he eats fried foods, he has more oily output. This may be an indication of fat malabsorption.      Diet recall:  Breakfast:  Apple w/o skin, banana, 1-2 eggs, Ensure plus  Lunch:  Juicing - spinach, carrots, apples, cucumbers (low pulp)  Dinner:  Chicken, beef, hamburger, chicken kiev, fried chicken  Pm snack  Ensure  Beverages:   >64 oz Gatroade, WHO, 2-3 beers/day    ANTHROPOMETRICS  Height: 69\"   Weight: 155 lbs/70kg  BMI: 22.7  Weight Status:  Normal BMI  IBW: 160 lbs  % IBW: 96%  Weight History: down 11 lb x past 6 week.   Wt Readings from Last 8 Encounters:   09/08/17 70.7 kg (155 lb 12.8 oz)   08/25/17 73.6 kg (162 lb 4.1 oz)   08/11/17 77 kg (169 lb 12.8 oz)   08/10/17 78.8 kg (173 lb 12.8 oz)   08/03/17 78.7 kg (173 lb 8 oz)   07/31/17 75.7 kg (166 lb 14.4 oz)       LABS  Labs reviewed    MEDICATIONS/VITAMINS/MINERALS  Medications reviewed    PROCEDURES WITH NUTRITIONAL " IMPLICATIONS  Chemotherapy - FOLFOX    ASSESSED NUTRITION NEEDS:  Estimated Energy Needs: 3129-2513 kcals (35-40 Kcal/Kg)  Justification: repletion  Estimated Protein Needs: 90 grams protein (1.2 g pro/Kg)  Justification: Repletion  Estimated Fluid Needs: 3000  mL  Justification: increased needs with ileostomy    MALNUTRITION:  % Weight Loss:  > 7.5% in 3 months (severe malnutrition)  % Intake:  Decreased intake does not meet criteria for malnutrition   Subcutaneous Fat Loss:  None observed  Muscle Loss:  Temporal region moderate depletion, Clavicle bone region moderate depletion and Anterior thigh region moderate depletion  Fluid Retention:  None noted    Malnutrition Diagnosis: Non-Severe malnutrition  In Context of:  Chronic illness or disease    NUTRITION DIAGNOSIS:  Altered GI function related to colon surgery with ileostomy as evidenced by ongoing loose stools.     INTERVENTIONS  Recommendations / Nutrition Prescription  1. 2500kcal/day via small frequent, bland, low residue meals  2. >3000mL fluids (Gatroade, WHO hydration formula) daily  Nutrition Education    Provided written & verbal education on:   - Ways to maximize kcal and protein intake. Discussed calorie and protein needs for maintenance of weight and nutrition status.  Advised pt to aim for 2500kcal and 90g protein via 5-6 small frequent meals.   - Coping with barriers - as chemotherapy progresses, eating may become more difficult and discussed ways to cope with this.  - Discussed diet guidelines for coping with ileostomy.  Strongly encouraged pt to avoid all fried foods.  Advised pt to avoid spicy foods and alcohol as these foods/beverages may exacerbate loose stools.   - Discussed potential need for PERT if loose/oily stools persist after diet modifications.  Pt may need Creon or Zenpep to help optimize fat/protein absorption.         Provided pt with corresponding education materials on:  -Diet Guidelines for Ileostomy, meal plan  -Tips for  Increasing calories in diet      Pt verbalize understanding of materials provided during consult.   Patient Understanding: good  Expected Compliance: good     Goals  1.  Aim for 5-6 small frequent meals  2.  Aim for 2500-3000kcal and 90g protein/day  3. Weight maintenance/weight repletion towards IBW 160lbs      Follow-Up Plans: Pt has RD contact information for questions.      MONITORING AND EVALUATION:  -Food intake  -Liquid meal replacement or supplement  -Weight trends  -GI (ileostomy output)      Time spent with patient: 60 minutes.  Karime Coronel RD, LD

## 2017-09-21 NOTE — PROGRESS NOTES
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 Imaging performed at that time showing sigmoid wall thickening with adjacent mesenteric lymphadenopathy with free fluid near the abdominal wall mess from prior hernia surgery. He underwent a colonoscopy that showed an obstructing sigmoid colon mass. Biopsy of the mass showing sigmoid adenocarcinoma. He then developed obstructive symptoms and underwent an exploratory laparotomy on 7/10/2017. During that procedure, he was found to have diffuse peritoneal carcinomatosis. Extensive lysis of adhesions was performed and a small bowel resection was performed with end ileostomy. The biopsies from multiple large peritoneal metastases were also positive for metastatic adenocarcinoma. He started FOLFOX (5-FU, oxaliplatin, leucovorin) on 8/11/17. He comes in today for routine follow up prior to cycle 4.     INTERVAL HISTORY:   Patient reports that he starting taking 20 mg omeprazole bid 2 days ago and he feels this is helping his stools thicken up. He does still continue to have high output. He did not find a benefit from Zofran for his diarrhea so he stopped it after a week. He enjoyed golfing yesterday with his brother. He has tried eating less high fat foods to help his ostomy. He reports getting in 6721-9085 calories/day. He is taking alpha lipoic acid and n-zimes 1 bid each to try to help his digestion. He is unsure if they help. He is drinking 120-130 oz fluids/day. He is drinking Ensure bid. He had tingling in her fingers and feet for 3 days following chemotherapy. He has cold sensitivity for a week. He denies any nausea with the chemotherapy. He plans to go [place] in [place] from 10/13-10/21. He denies other concerns.     Current Outpatient  "Prescriptions   Medication Sig Dispense Refill     OMEPRAZOLE PO Take 20 mg by mouth 2 times daily (before meals)       amylase-lipase-protease (CREON) 40280 UNITS CPEP Take 2 capsules (72,000 Units) by mouth 3 times daily (with meals) And 1 capsule with each snack tid. 63 capsule 3     Potassium Chloride 40 MEQ/15ML (20%) SOLN Take 15 mLs (40 mEq) by mouth daily 450 mL 3     opium tincture tincture Take 0.6 mLs (6 mg) by mouth 4 times daily 72 mL 0     Psyllium (METAMUCIL FIBER) 51.7 % PACK Take 1 packet by mouth 3 times daily 90 each 3     LORazepam (ATIVAN) 0.5 MG tablet Take 1 tablet (0.5 mg) by mouth every 4 hours as needed (Anxiety, Nausea/Vomiting or Sleep) 30 tablet 2     loperamide (IMODIUM) 2 MG capsule Take 4 mg by mouth 4 times daily as needed for diarrhea        Ferrous Sulfate (IRON SUPPLEMENT PO) Take 325 mg by mouth 2 times daily (with meals)        ondansetron (ZOFRAN) 8 MG tablet Take 1 tablet (8 mg) by mouth every 8 hours (Patient not taking: Reported on 9/22/2017) 90 tablet 2     diphenoxylate-atropine (LOMOTIL) 2.5-0.025 MG per tablet Take 1-2 tablets by mouth 4 times daily as needed for diarrhea (Patient not taking: Reported on 9/8/2017) 40 tablet 1     prochlorperazine (COMPAZINE) 10 MG tablet Take 1 tablet (10 mg) by mouth every 6 hours as needed (Nausea/Vomiting) (Patient not taking: Reported on 9/8/2017) 30 tablet 2     cholestyramine (QUESTRAN) 4 G Packet Take 2 packets by mouth 3 times daily (with meals)          No Known Allergies    PHYSICAL EXAMINATION  /78 (BP Location: Right arm, Patient Position: Sitting, Cuff Size: Adult Regular)  Pulse 81  Temp 99  F (37.2  C) (Oral)  Resp 18  Ht 1.753 m (5' 9.02\")  Wt 70.2 kg (154 lb 12.8 oz)  SpO2 98%  BMI 22.85 kg/m2   Wt Readings from Last 10 Encounters:   09/22/17 70.2 kg (154 lb 12.8 oz)   09/08/17 70.7 kg (155 lb 12.8 oz)   08/25/17 73.6 kg (162 lb 4.1 oz)   08/11/17 77 kg (169 lb 12.8 oz)   08/10/17 78.8 kg (173 lb 12.8 oz) "   08/03/17 78.7 kg (173 lb 8 oz)   07/31/17 75.7 kg (166 lb 14.4 oz)   Constitutional: Alert, oriented male in no visible distress.  Eyes: PERRL. EOMI. Anicteric sclerae.  ENT/Mouth: OM moist and pink without lesions or thrush.  CV: RRR.  Resp: CTAB throughout  Abdomen: Soft, non-tender, non-distended. Bowel sounds present. No masses appreciated. No organomegaly noted. Ostomy at RLQ draining liquid brown stool with very little form.  Extremities: No lower extremity edema appreciated.  Skin: Warm, dry. No bruising or petechiae noted.  Lymph: No cervical or supraclavicular lymphadenopathy appreciated.   Neuro: CN II-XII grossly intact.    LABS:   9/22/2017 07:17   Sodium 132 (L)   Potassium 3.0 (L)   Chloride 92 (L)   Carbon Dioxide 31   Urea Nitrogen 17   Creatinine 1.31 (H)   GFR Estimate 57 (L)   GFR Estimate If Black 69   Calcium 9.0   Anion Gap 9   Magnesium 1.8   Albumin 3.2 (L)   Protein Total 8.0   Bilirubin Total 0.6   Alkaline Phosphatase 144   ALT 39   AST 42   Glucose 97   WBC 7.3   Hemoglobin 10.8 (L)   Hematocrit 32.6 (L)   Platelet Count 158   RBC Count 4.06 (L)   MCV 80   MCH 26.6   MCHC 33.1   RDW 24.1 (H)   Diff Method Automated Method   % Neutrophils 58.4   % Lymphocytes 19.6   % Monocytes 18.7   % Eosinophils 2.2   % Basophils 0.7   % Immature Granulocytes 0.4   Nucleated RBCs 0   Absolute Neutrophil 4.3   Absolute Lymphocytes 1.4   Absolute Monocytes 1.4 (H)   Absolute Eosinophils 0.2   Absolute Basophils 0.1   Abs Immature Granulocytes 0.0   Absolute Nucleated RBC 0.0     IMPRESSION/PLAN:  Sebas Lopez is a 52 year old male with history of ulcerative colitis, now with sigmoid adenocarcinoma with peritoneal carcinomatosis, status-post ex-lap on 7/10 with extensive lysis of adhesions and small-bowel resection with end ileostomy.    Sigmoid cancer with peritoneal carcinomatosis:  He started on FOLFOX on 8/11/17. He appears to have tolerated the first 3 cycles well with some mild cold  sensitivity that has since resolved. He has had some tingling in his fingers and feet, beginning with cycle 2. He will start cycle 4 today. He will follow up in clinic every 2 weeks prior to each cycle.   - Will plan to schedule around his upcoming trip to [place].   - Plan to re-image after 6 cycles and return to see Dr. Dudley  - He may potentially be able to go on to cytoreductive surgery with [facility] as mentioned in Dr. Dudley's note  - Patient canceled his genetics counseling appt. Will need to consider rescheduling. Plan to discuss with him again at the next visit.    High-output ostomy: Chronic since surgery. Currently on Imodium up to 8 tablets/day and tincture of opium 6 mg qid. He is also taking Metamucil tid. He previously did not find a benefit from Lomotil, Zofran, or cholestyramine. He will try Creon 2 with each meal and 1 with each snack. He will continue to push fluids orally. He appears mildly dehydrated today and will receive 1L IV NS today.    Iron deficiency anemia: secondary to chronic blood loss. No blood loss noted recently by patient. Received Infed 8/8. Hgb has trended down a little again, but MCV still okay.  Will recheck iron studies in early November.     Weight loss: Patient will continue to work on a high calorie diet. He is currently getting in 3865-7189 calories/day. He met with a dietician  to discuss diet management to help with the weight loss and high output ostomy.     Hypokalemia: Worse again. Will replace IV today and start po 40 mEq daily in liquid form. Secondary to high output ostomy. He was also previously given a list of high potassium foods.    Linda Alves PA-C  [facility]  [address], [place] [id number] [phone]

## 2017-09-22 ENCOUNTER — INFUSION THERAPY VISIT (OUTPATIENT)
Dept: ONCOLOGY | Facility: CLINIC | Age: 52
End: 2017-09-22
Attending: INTERNAL MEDICINE
Payer: COMMERCIAL

## 2017-09-22 VITALS
DIASTOLIC BLOOD PRESSURE: 78 MMHG | TEMPERATURE: 99 F | BODY MASS INDEX: 22.93 KG/M2 | WEIGHT: 154.8 LBS | HEIGHT: 69 IN | SYSTOLIC BLOOD PRESSURE: 117 MMHG | HEART RATE: 81 BPM | OXYGEN SATURATION: 98 % | RESPIRATION RATE: 18 BRPM

## 2017-09-22 DIAGNOSIS — C78.6 PERITONEAL CARCINOMATOSIS (H): Primary | ICD-10-CM

## 2017-09-22 DIAGNOSIS — R19.7 DIARRHEA, UNSPECIFIED TYPE: ICD-10-CM

## 2017-09-22 DIAGNOSIS — C18.7 CANCER OF SIGMOID COLON (H): ICD-10-CM

## 2017-09-22 DIAGNOSIS — E87.6 HYPOKALEMIA: ICD-10-CM

## 2017-09-22 LAB
ALBUMIN SERPL-MCNC: 3.2 G/DL (ref 3.4–5)
ALP SERPL-CCNC: 144 U/L (ref 40–150)
ALT SERPL W P-5'-P-CCNC: 39 U/L (ref 0–70)
ANION GAP SERPL CALCULATED.3IONS-SCNC: 9 MMOL/L (ref 3–14)
AST SERPL W P-5'-P-CCNC: 42 U/L (ref 0–45)
BASOPHILS # BLD AUTO: 0.1 10E9/L (ref 0–0.2)
BASOPHILS NFR BLD AUTO: 0.7 %
BILIRUB SERPL-MCNC: 0.6 MG/DL (ref 0.2–1.3)
BUN SERPL-MCNC: 17 MG/DL (ref 7–30)
CALCIUM SERPL-MCNC: 9 MG/DL (ref 8.5–10.1)
CHLORIDE SERPL-SCNC: 92 MMOL/L (ref 94–109)
CO2 SERPL-SCNC: 31 MMOL/L (ref 20–32)
CREAT SERPL-MCNC: 1.31 MG/DL (ref 0.66–1.25)
DIFFERENTIAL METHOD BLD: ABNORMAL
EOSINOPHIL # BLD AUTO: 0.2 10E9/L (ref 0–0.7)
EOSINOPHIL NFR BLD AUTO: 2.2 %
ERYTHROCYTE [DISTWIDTH] IN BLOOD BY AUTOMATED COUNT: 24.1 % (ref 10–15)
GFR SERPL CREATININE-BSD FRML MDRD: 57 ML/MIN/1.7M2
GLUCOSE SERPL-MCNC: 97 MG/DL (ref 70–99)
HCT VFR BLD AUTO: 32.6 % (ref 40–53)
HGB BLD-MCNC: 10.8 G/DL (ref 13.3–17.7)
IMM GRANULOCYTES # BLD: 0 10E9/L (ref 0–0.4)
IMM GRANULOCYTES NFR BLD: 0.4 %
LYMPHOCYTES # BLD AUTO: 1.4 10E9/L (ref 0.8–5.3)
LYMPHOCYTES NFR BLD AUTO: 19.6 %
MAGNESIUM SERPL-MCNC: 1.8 MG/DL (ref 1.6–2.3)
MCH RBC QN AUTO: 26.6 PG (ref 26.5–33)
MCHC RBC AUTO-ENTMCNC: 33.1 G/DL (ref 31.5–36.5)
MCV RBC AUTO: 80 FL (ref 78–100)
MONOCYTES # BLD AUTO: 1.4 10E9/L (ref 0–1.3)
MONOCYTES NFR BLD AUTO: 18.7 %
NEUTROPHILS # BLD AUTO: 4.3 10E9/L (ref 1.6–8.3)
NEUTROPHILS NFR BLD AUTO: 58.4 %
NRBC # BLD AUTO: 0 10*3/UL
NRBC BLD AUTO-RTO: 0 /100
PLATELET # BLD AUTO: 158 10E9/L (ref 150–450)
POTASSIUM SERPL-SCNC: 3 MMOL/L (ref 3.4–5.3)
PROT SERPL-MCNC: 8 G/DL (ref 6.8–8.8)
RBC # BLD AUTO: 4.06 10E12/L (ref 4.4–5.9)
SODIUM SERPL-SCNC: 132 MMOL/L (ref 133–144)
WBC # BLD AUTO: 7.3 10E9/L (ref 4–11)

## 2017-09-22 PROCEDURE — 96368 THER/DIAG CONCURRENT INF: CPT

## 2017-09-22 PROCEDURE — 25000128 H RX IP 250 OP 636: Mod: ZF | Performed by: PHYSICIAN ASSISTANT

## 2017-09-22 PROCEDURE — 83735 ASSAY OF MAGNESIUM: CPT | Performed by: INTERNAL MEDICINE

## 2017-09-22 PROCEDURE — 96415 CHEMO IV INFUSION ADDL HR: CPT

## 2017-09-22 PROCEDURE — 85025 COMPLETE CBC W/AUTO DIFF WBC: CPT | Performed by: INTERNAL MEDICINE

## 2017-09-22 PROCEDURE — 96367 TX/PROPH/DG ADDL SEQ IV INF: CPT

## 2017-09-22 PROCEDURE — 99212 OFFICE O/P EST SF 10 MIN: CPT | Mod: ZF

## 2017-09-22 PROCEDURE — 96413 CHEMO IV INFUSION 1 HR: CPT

## 2017-09-22 PROCEDURE — 96366 THER/PROPH/DIAG IV INF ADDON: CPT

## 2017-09-22 PROCEDURE — 96416 CHEMO PROLONG INFUSE W/PUMP: CPT

## 2017-09-22 PROCEDURE — 99214 OFFICE O/P EST MOD 30 MIN: CPT | Mod: ZP | Performed by: PHYSICIAN ASSISTANT

## 2017-09-22 PROCEDURE — 80053 COMPREHEN METABOLIC PANEL: CPT | Performed by: INTERNAL MEDICINE

## 2017-09-22 PROCEDURE — 96411 CHEMO IV PUSH ADDL DRUG: CPT

## 2017-09-22 RX ORDER — FLUOROURACIL 50 MG/ML
400 INJECTION, SOLUTION INTRAVENOUS ONCE
Status: COMPLETED | OUTPATIENT
Start: 2017-09-22 | End: 2017-09-22

## 2017-09-22 RX ORDER — POTASSIUM CHLORIDE 3 G/15ML
40 SOLUTION ORAL DAILY
Qty: 450 ML | Refills: 3 | Status: ON HOLD | OUTPATIENT
Start: 2017-09-22 | End: 2018-01-01

## 2017-09-22 RX ORDER — ALBUTEROL SULFATE 0.83 MG/ML
2.5 SOLUTION RESPIRATORY (INHALATION)
Status: CANCELLED | OUTPATIENT
Start: 2017-09-22

## 2017-09-22 RX ORDER — EPINEPHRINE 1 MG/ML
0.3 INJECTION INTRAMUSCULAR; INTRAVENOUS; SUBCUTANEOUS EVERY 5 MIN PRN
Status: CANCELLED | OUTPATIENT
Start: 2017-09-22

## 2017-09-22 RX ORDER — ALBUTEROL SULFATE 90 UG/1
1-2 AEROSOL, METERED RESPIRATORY (INHALATION)
Status: CANCELLED
Start: 2017-09-22

## 2017-09-22 RX ORDER — EPINEPHRINE 0.3 MG/.3ML
0.3 INJECTION SUBCUTANEOUS EVERY 5 MIN PRN
Status: CANCELLED | OUTPATIENT
Start: 2017-09-22

## 2017-09-22 RX ORDER — LORAZEPAM 2 MG/ML
0.5 INJECTION INTRAMUSCULAR EVERY 4 HOURS PRN
Status: CANCELLED
Start: 2017-09-22

## 2017-09-22 RX ORDER — HEPARIN SODIUM (PORCINE) LOCK FLUSH IV SOLN 100 UNIT/ML 100 UNIT/ML
5 SOLUTION INTRAVENOUS EVERY 8 HOURS
Status: DISCONTINUED | OUTPATIENT
Start: 2017-09-22 | End: 2017-09-22 | Stop reason: HOSPADM

## 2017-09-22 RX ORDER — FLUOROURACIL 50 MG/ML
400 INJECTION, SOLUTION INTRAVENOUS ONCE
Status: CANCELLED | OUTPATIENT
Start: 2017-09-22

## 2017-09-22 RX ORDER — DIPHENHYDRAMINE HYDROCHLORIDE 50 MG/ML
50 INJECTION INTRAMUSCULAR; INTRAVENOUS
Status: CANCELLED
Start: 2017-09-22

## 2017-09-22 RX ORDER — MEPERIDINE HYDROCHLORIDE 25 MG/ML
25 INJECTION INTRAMUSCULAR; INTRAVENOUS; SUBCUTANEOUS EVERY 30 MIN PRN
Status: CANCELLED | OUTPATIENT
Start: 2017-09-22

## 2017-09-22 RX ORDER — SODIUM CHLORIDE 9 MG/ML
1000 INJECTION, SOLUTION INTRAVENOUS CONTINUOUS PRN
Status: CANCELLED
Start: 2017-09-22

## 2017-09-22 RX ORDER — METHYLPREDNISOLONE SODIUM SUCCINATE 125 MG/2ML
125 INJECTION, POWDER, LYOPHILIZED, FOR SOLUTION INTRAMUSCULAR; INTRAVENOUS
Status: CANCELLED
Start: 2017-09-22

## 2017-09-22 RX ADMIN — OXALIPLATIN 150 MG: 5 INJECTION, SOLUTION, CONCENTRATE INTRAVENOUS at 10:52

## 2017-09-22 RX ADMIN — FLUOROURACIL 745 MG: 50 INJECTION, SOLUTION INTRAVENOUS at 13:05

## 2017-09-22 RX ADMIN — DEXAMETHASONE SODIUM PHOSPHATE: 10 INJECTION, SOLUTION INTRAMUSCULAR; INTRAVENOUS at 10:34

## 2017-09-22 RX ADMIN — POTASSIUM CHLORIDE: 149 INJECTION, SOLUTION, CONCENTRATE INTRAVENOUS at 08:33

## 2017-09-22 RX ADMIN — SODIUM CHLORIDE, PRESERVATIVE FREE 5 ML: 5 INJECTION INTRAVENOUS at 07:12

## 2017-09-22 RX ADMIN — DEXTROSE MONOHYDRATE 250 ML: 50 INJECTION, SOLUTION INTRAVENOUS at 10:34

## 2017-09-22 RX ADMIN — LEUCOVORIN CALCIUM 650 MG: 350 INJECTION, POWDER, LYOPHILIZED, FOR SOLUTION INTRAMUSCULAR; INTRAVENOUS at 10:52

## 2017-09-22 ASSESSMENT — PAIN SCALES - GENERAL: PAINLEVEL: NO PAIN (0)

## 2017-09-22 NOTE — NURSING NOTE
"Oncology Rooming Note    September 22, 2017 7:31 AM   Sebas Lopez is a 52 year old male who presents for:    Chief Complaint   Patient presents with     Oncology Clinic Visit     Colon Ca F/U     Port Draw     Right port accessed with a power por needle, labs drawn and sent, vitals completed, flushed with saline and heparin, checked into next appointment.     Initial Vitals: /78 (BP Location: Right arm, Patient Position: Sitting, Cuff Size: Adult Regular)  Pulse 81  Temp 99  F (37.2  C) (Oral)  Resp 18  Ht 1.753 m (5' 9.02\")  Wt 70.2 kg (154 lb 12.8 oz)  SpO2 98%  BMI 22.85 kg/m2 Estimated body mass index is 22.85 kg/(m^2) as calculated from the following:    Height as of this encounter: 1.753 m (5' 9.02\").    Weight as of this encounter: 70.2 kg (154 lb 12.8 oz). Body surface area is 1.85 meters squared.  No Pain (0) Comment: Data Unavailable   No LMP for male patient.  Allergies reviewed: Yes  Medications reviewed: Yes    Medications: MEDICATION REFILLS NEEDED TODAY. Provider was notified.  Pharmacy name entered into [facility]: [facility] - [place], [place] - [address] [place] [address]    Clinical concerns: Opium Tincture. Pt has prescription. Linda Alves was NOT notified.    7 minutes for nursing intake (face to face time)     Kay Trujillo LPN              "

## 2017-09-22 NOTE — NURSING NOTE
Chief Complaint   Patient presents with     Oncology Clinic Visit     Colon Ca F/U     Port Draw     Right port accessed with a power por needle, labs drawn and sent, vitals completed, flushed with saline and heparin, checked into next appointment.   Melissa PaulinoRN

## 2017-09-22 NOTE — PROGRESS NOTES
Infusion Nursing Note:  Sebas Lopez presents today for cycle 4 day 1 Leucovorin, oxaliplatin, fluorouracil bolus and pump connection. .    Patient seen by provider today: Yes: Linda Alves PA-C   present during visit today: Not Applicable.    Note: Proceed with treatment. Potassium will be replaced today as ordered in treatment plan.    Intravenous Access:  Implanted Port.    Treatment Conditions:  Lab Results   Component Value Date    HGB 10.8 09/22/2017     Lab Results   Component Value Date    WBC 7.3 09/22/2017      Lab Results   Component Value Date    ANEU 4.3 09/22/2017     Lab Results   Component Value Date     09/22/2017      Lab Results   Component Value Date     09/22/2017                   Lab Results   Component Value Date    POTASSIUM 3.0 09/22/2017           Lab Results   Component Value Date    MAG 1.8 09/22/2017            Lab Results   Component Value Date    CR 1.31 09/22/2017                   Lab Results   Component Value Date    ANNAMARIE 9.0 09/22/2017                Lab Results   Component Value Date    BILITOTAL 0.6 09/22/2017           Lab Results   Component Value Date    ALBUMIN 3.2 09/22/2017                    Lab Results   Component Value Date    ALT 39 09/22/2017           Lab Results   Component Value Date    AST 42 09/22/2017     Results reviewed, labs MET treatment parameters, ok to proceed with treatment.          Post Infusion Assessment:  Patient tolerated infusion without incident.  Blood return noted pre and post infusion.  Site patent and intact, free from redness, edema or discomfort.  No evidence of extravasations.  Fluorouracil pump connected and will run continuously over the next 46 hours.  All connections secured and double checked by another RN.  Pt scheduled for pump disconnect with [facility] on Sunday at 9/24 at 1100.    Discharge Plan:   Prescription refills given for opium.  Discharge instructions reviewed with:  Patient.  Patient and/or family verbalized understanding of discharge instructions and all questions answered.  Copy of AVS reviewed with patient and/or family.  Patient will return 10/5/17 for next appointment.  Patient discharged in stable condition accompanied by: mother and father.  Departure Mode: Ambulatory.    Jess Hawkins RN

## 2017-09-22 NOTE — LETTER
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 Imaging performed at that time showing sigmoid wall thickening with adjacent mesenteric lymphadenopathy with free fluid near the abdominal wall mess from prior hernia surgery. He underwent a colonoscopy that showed an obstructing sigmoid colon mass. Biopsy of the mass showing sigmoid adenocarcinoma. He then developed obstructive symptoms and underwent an exploratory laparotomy on 7/10/2017. During that procedure, he was found to have diffuse peritoneal carcinomatosis. Extensive lysis of adhesions was performed and a small bowel resection was performed with end ileostomy. The biopsies from multiple large peritoneal metastases were also positive for metastatic adenocarcinoma. He started FOLFOX (5-FU, oxaliplatin, leucovorin) on 8/11/17. He comes in today for routine follow up prior to cycle 4.     INTERVAL HISTORY:   Patient reports that he starting taking 20 mg omeprazole bid 2 days ago and he feels this is helping his stools thicken up. He does still continue to have high output. He did not find a benefit from Zofran for his diarrhea so he stopped it after a week. He enjoyed golfing yesterday with his brother. He has tried eating less high fat foods to help his ostomy. He reports getting in 9706-1926 calories/day. He is taking alpha lipoic acid and n-zimes 1 bid each to try to help his digestion. He is unsure if they help. He is drinking 120-130 oz fluids/day. He is drinking Ensure bid. He had tingling in her fingers and feet for 3 days following chemotherapy. He has cold sensitivity for a week. He denies any nausea with the chemotherapy. He plans to go [place] [employer] in  "[place] from 10/13-10/21. He denies other concerns.     Current Outpatient Prescriptions   Medication Sig Dispense Refill     OMEPRAZOLE PO Take 20 mg by mouth 2 times daily (before meals)       amylase-lipase-protease (CREON) 57673 UNITS CPEP Take 2 capsules (72,000 Units) by mouth 3 times daily (with meals) And 1 capsule with each snack tid. 63 capsule 3     Potassium Chloride 40 MEQ/15ML (20%) SOLN Take 15 mLs (40 mEq) by mouth daily 450 mL 3     opium tincture tincture Take 0.6 mLs (6 mg) by mouth 4 times daily 72 mL 0     Psyllium (METAMUCIL FIBER) 51.7 % PACK Take 1 packet by mouth 3 times daily 90 each 3     LORazepam (ATIVAN) 0.5 MG tablet Take 1 tablet (0.5 mg) by mouth every 4 hours as needed (Anxiety, Nausea/Vomiting or Sleep) 30 tablet 2     loperamide (IMODIUM) 2 MG capsule Take 4 mg by mouth 4 times daily as needed for diarrhea        Ferrous Sulfate (IRON SUPPLEMENT PO) Take 325 mg by mouth 2 times daily (with meals)        ondansetron (ZOFRAN) 8 MG tablet Take 1 tablet (8 mg) by mouth every 8 hours (Patient not taking: Reported on 9/22/2017) 90 tablet 2     diphenoxylate-atropine (LOMOTIL) 2.5-0.025 MG per tablet Take 1-2 tablets by mouth 4 times daily as needed for diarrhea (Patient not taking: Reported on 9/8/2017) 40 tablet 1     prochlorperazine (COMPAZINE) 10 MG tablet Take 1 tablet (10 mg) by mouth every 6 hours as needed (Nausea/Vomiting) (Patient not taking: Reported on 9/8/2017) 30 tablet 2     cholestyramine (QUESTRAN) 4 G Packet Take 2 packets by mouth 3 times daily (with meals)          No Known Allergies    PHYSICAL EXAMINATION  /78 (BP Location: Right arm, Patient Position: Sitting, Cuff Size: Adult Regular)  Pulse 81  Temp 99  F (37.2  C) (Oral)  Resp 18  Ht 1.753 m (5' 9.02\")  Wt 70.2 kg (154 lb 12.8 oz)  SpO2 98%  BMI 22.85 kg/m2   Wt Readings from Last 10 Encounters:   09/22/17 70.2 kg (154 lb 12.8 oz)   09/08/17 70.7 kg (155 lb 12.8 oz)   08/25/17 73.6 kg (162 lb 4.1 " oz)   08/11/17 77 kg (169 lb 12.8 oz)   08/10/17 78.8 kg (173 lb 12.8 oz)   08/03/17 78.7 kg (173 lb 8 oz)   07/31/17 75.7 kg (166 lb 14.4 oz)   Constitutional: Alert, oriented male in no visible distress.  Eyes: PERRL. EOMI. Anicteric sclerae.  ENT/Mouth: OM moist and pink without lesions or thrush.  CV: RRR.  Resp: CTAB throughout  Abdomen: Soft, non-tender, non-distended. Bowel sounds present. No masses appreciated. No organomegaly noted. Ostomy at RLQ draining liquid brown stool with very little form.  Extremities: No lower extremity edema appreciated.  Skin: Warm, dry. No bruising or petechiae noted.  Lymph: No cervical or supraclavicular lymphadenopathy appreciated.   Neuro: CN II-XII grossly intact.    LABS:   9/22/2017 07:17   Sodium 132 (L)   Potassium 3.0 (L)   Chloride 92 (L)   Carbon Dioxide 31   Urea Nitrogen 17   Creatinine 1.31 (H)   GFR Estimate 57 (L)   GFR Estimate If Black 69   Calcium 9.0   Anion Gap 9   Magnesium 1.8   Albumin 3.2 (L)   Protein Total 8.0   Bilirubin Total 0.6   Alkaline Phosphatase 144   ALT 39   AST 42   Glucose 97   WBC 7.3   Hemoglobin 10.8 (L)   Hematocrit 32.6 (L)   Platelet Count 158   RBC Count 4.06 (L)   MCV 80   MCH 26.6   MCHC 33.1   RDW 24.1 (H)   Diff Method Automated Method   % Neutrophils 58.4   % Lymphocytes 19.6   % Monocytes 18.7   % Eosinophils 2.2   % Basophils 0.7   % Immature Granulocytes 0.4   Nucleated RBCs 0   Absolute Neutrophil 4.3   Absolute Lymphocytes 1.4   Absolute Monocytes 1.4 (H)   Absolute Eosinophils 0.2   Absolute Basophils 0.1   Abs Immature Granulocytes 0.0   Absolute Nucleated RBC 0.0     IMPRESSION/PLAN:  Sebas Lopez is a 52 year old male with history of ulcerative colitis, now with sigmoid adenocarcinoma with peritoneal carcinomatosis, status-post ex-lap on 7/10 with extensive lysis of adhesions and small-bowel resection with end ileostomy.    Sigmoid cancer with peritoneal carcinomatosis:  He started on FOLFOX on 8/11/17. He  appears to have tolerated the first 3 cycles well with some mild cold sensitivity that has since resolved. He has had some tingling in his fingers and feet, beginning with cycle 2. He will start cycle 4 today. He will follow up in clinic every 2 weeks prior to each cycle.   - Will plan to schedule around his upcoming trip to [place].   - Plan to re-image after 6 cycles and return to see Dr. Dudley  - He may potentially be able to go on to cytoreductive surgery with [facility] as mentioned in Dr. Dudley's note  - Patient canceled his genetics counseling appt. Will need to consider rescheduling. Plan to discuss with him again at the next visit.    High-output ostomy: Chronic since surgery. Currently on Imodium up to 8 tablets/day and tincture of opium 6 mg qid. He is also taking Metamucil tid. He previously did not find a benefit from Lomotil, Zofran, or cholestyramine. He will try Creon 2 with each meal and 1 with each snack. He will continue to push fluids orally. He appears mildly dehydrated today and will receive 1L IV NS today.    Iron deficiency anemia: secondary to chronic blood loss. No blood loss noted recently by patient. Received Infed 8/8. Hgb has trended down a little again, but MCV still okay.  Will recheck iron studies in early November.     Weight loss: Patient will continue to work on a high calorie diet. He is currently getting in 9652-3728 calories/day. He met with a dietician  to discuss diet management to help with the weight loss and high output ostomy.     Hypokalemia: Worse again. Will replace IV today and start po 40 mEq daily in liquid form. Secondary to high output ostomy. He was also previously given a list of high potassium foods.    Linda Alves PA-C  [facility]  [address] [place] [address] [phone]

## 2017-09-22 NOTE — PATIENT INSTRUCTIONS
Contact Numbers    [facility] [place]: [phone]  [facility] [place]:  [phone]    Call triage with chills and/or temperature greater than or equal to 100.5, uncontrolled nausea/vomiting, diarrhea, constipation, dizziness, shortness of breath, chest pain, bleeding, unexplained bruising, or any new/concerning symptoms, questions/concerns.     If you are having any concerning symptoms or wish to speak to a provider before your next infusion visit, please call your care coordinator or triage to notify them so we can adequately serve you.       After Hours: [phone]    If after hours, weekends, or holidays, call main hospital  and ask for Oncology doctor on call.         September 2017 Sunday Monday Tuesday Wednesday Thursday Friday Saturday                            1     2       3     4     5     6     7     8     [facility] DRAW    7:15 AM   (15 min.)    MASONIC LAB DRAW   [facility] Masonic Lab Draw     [facility] RETURN    7:35 AM   (50 min.)   Linda Alves PA-C [facility]     [facility] ONC INFUSION 240    8:30 AM   (240 min.)    ONCOLOGY INFUSION   [facility] 9       10     11     12     13     14     15     16       17     18     [facility] NEW   10:15 AM   (60 min.)   Karime Jones RD   [facility] 19     20     21     22     [facility] DRAW    7:15 AM   (15 min.)   [facility]   [facility] Lab Draw     [facility] RETURN    7:35 AM   (50 min.)   Linda Alves PA-C [facility]     [facility] ONC INFUSION 240    8:30 AM   (240 min.)   [facility] ONCOLOGY INFUSION   [facility] 23       24     25     26     27     28     29     [facility] DRAW   11:30 AM   (15 min.)   [facility] DRAW   [facility] Lab Draw     [facility] ONC INFUSION 120   12:00 PM   (120 min.)    ONCOLOGY INFUSION   [facility] 30 October 2017 Sunday Monday Tuesday Wednesday  Thursday Friday Saturday   1     2     3     4     5     [facility] MASONIC LAB DRAW   11:30 AM   (15 min.)    MASONIC LAB DRAW   [facility] Lab Draw     [facility] RETURN   11:45 AM   (30 min.)   Albert Long MD   [facility] ONC INFUSION 240    1:00 PM   (240 min.)    ONCOLOGY INFUSION   [facility] 6     7       8     9     10     11     12     13     14       15     16     17     18     19     20     21       22     23     24     25     26     27     28       29     30     31                                      Lab Results:  Recent Results (from the past 12 hour(s))   Magnesium    Collection Time: 09/22/17  7:17 AM   Result Value Ref Range    Magnesium 1.8 1.6 - 2.3 mg/dL   CBC with platelets differential    Collection Time: 09/22/17  7:17 AM   Result Value Ref Range    WBC 7.3 4.0 - 11.0 10e9/L    RBC Count 4.06 (L) 4.4 - 5.9 10e12/L    Hemoglobin 10.8 (L) 13.3 - 17.7 g/dL    Hematocrit 32.6 (L) 40.0 - 53.0 %    MCV 80 78 - 100 fl    MCH 26.6 26.5 - 33.0 pg    MCHC 33.1 31.5 - 36.5 g/dL    RDW 24.1 (H) 10.0 - 15.0 %    Platelet Count 158 150 - 450 10e9/L    Diff Method Automated Method     % Neutrophils 58.4 %    % Lymphocytes 19.6 %    % Monocytes 18.7 %    % Eosinophils 2.2 %    % Basophils 0.7 %    % Immature Granulocytes 0.4 %    Nucleated RBCs 0 0 /100    Absolute Neutrophil 4.3 1.6 - 8.3 10e9/L    Absolute Lymphocytes 1.4 0.8 - 5.3 10e9/L    Absolute Monocytes 1.4 (H) 0.0 - 1.3 10e9/L    Absolute Eosinophils 0.2 0.0 - 0.7 10e9/L    Absolute Basophils 0.1 0.0 - 0.2 10e9/L    Abs Immature Granulocytes 0.0 0 - 0.4 10e9/L    Absolute Nucleated RBC 0.0    Comprehensive metabolic panel    Collection Time: 09/22/17  7:17 AM   Result Value Ref Range    Sodium 132 (L) 133 - 144 mmol/L    Potassium 3.0 (L) 3.4 - 5.3 mmol/L    Chloride 92 (L) 94 - 109 mmol/L    Carbon Dioxide 31 20 - 32 mmol/L    Anion Gap 9 3 - 14 mmol/L    Glucose 97 70 - 99 mg/dL    Urea Nitrogen 17 7  - 30 mg/dL    Creatinine 1.31 (H) 0.66 - 1.25 mg/dL    GFR Estimate 57 (L) >60 mL/min/1.7m2    GFR Estimate If Black 69 >60 mL/min/1.7m2    Calcium 9.0 8.5 - 10.1 mg/dL    Bilirubin Total 0.6 0.2 - 1.3 mg/dL    Albumin 3.2 (L) 3.4 - 5.0 g/dL    Protein Total 8.0 6.8 - 8.8 g/dL    Alkaline Phosphatase 144 40 - 150 U/L    ALT 39 0 - 70 U/L    AST 42 0 - 45 U/L

## 2017-09-22 NOTE — MR AVS SNAPSHOT
After Visit Summary   9/22/2017    Sebas Lopez    MRN: [id number]           Patient Information     Date Of Birth          1965        Visit Information        Provider Department      9/22/2017 7:50 AM Linda Alves PA-C [facility]        Today's Diagnoses     Peritoneal carcinomatosis (H)    -  1    Diarrhea, unspecified type        Cancer of sigmoid colon (H)        Hypokalemia           Follow-ups after your visit        Your next 10 appointments already scheduled     Sep 29, 2017 11:30 AM CDT   [facility] Lab Draw with [facility] DRAW   [facility] ([facility])    [address] [address]   [phone]            Sep 29, 2017 12:00 PM CDT   Infusion 120 with [facility] ONCOLOGY INFUSION, [facility] 30 ATC   [facility] ([facility])    [address] [address]   [phone]            Oct 05, 2017 11:30 AM CDT   [facility]ic Lab Draw with [facility] LAB DRAW   [facility] ([facility])    [address] [address]   [phone]            Oct 05, 2017 12:00 PM CDT   (Arrive by 11:45 AM)   Return Visit with Albert Long MD   [facility] ([facility])    [address] [address]   [phone]            Oct 05, 2017  1:00 PM CDT   Infusion 240 with [facility] ONCOLOGY INFUSION, UC 10 ATC   [facility] ([facility])    [address] [place] [address]   [phone]              Future tests that were ordered for you today     Open Future Orders        Priority Expected Expires Ordered    Comprehensive metabolic panel Routine 9/29/2017 9/22/2018 9/22/2017            Who to contact     If you have questions or need follow up  "information about today's clinic visit or your schedule please contact [facility] directly at [phone].  Normal or non-critical lab and imaging results will be communicated to you by Actifio[employer], letter or phone within 4 business days after the clinic has received the results. If you do not hear from us within 7 days, please contact the clinic through Availigentt or phone. If you have a critical or abnormal lab result, we will notify you by phone as soon as possible.  Submit refill requests through [employer] or call your pharmacy and they will forward the refill request to us. Please allow 3 business days for your refill to be completed.          Additional Information About Your Visit        [employer] Information     [employer] gives you secure access to your electronic health record. If you see a primary care provider, you can also send messages to your care team and make appointments. If you have questions, please call your primary care clinic.  If you do not have a primary care provider, please call [phone] and they will assist you.        Care EveryWhere ID     This is your Care EveryWhere ID. This could be used by other organizations to access your [place] medical records  [id number]        Your Vitals Were     Pulse Temperature Respirations Height Pulse Oximetry BMI (Body Mass Index)    81 99  F (37.2  C) (Oral) 18 1.753 m (5' 9.02\") 98% 22.85 kg/m2       Blood Pressure from Last 3 Encounters:   09/22/17 117/78   09/08/17 126/86   08/25/17 120/75    Weight from Last 3 Encounters:   09/22/17 70.2 kg (154 lb 12.8 oz)   09/08/17 70.7 kg (155 lb 12.8 oz)   08/25/17 73.6 kg (162 lb 4.1 oz)                 Today's Medication Changes          These changes are accurate as of: 9/22/17  8:40 AM.  If you have any questions, ask your nurse or doctor.               Start taking these medicines.        Dose/Directions    amylase-lipase-protease 52037 UNITS Cpep   Commonly known as:  CREON   Used for:  " Diarrhea, unspecified type, Peritoneal carcinomatosis (H), Cancer of sigmoid colon (H)   Started by:  Linda Alves PA-CRISTIANO        Dose:  2 capsule   Take 2 capsules (72,000 Units) by mouth 3 times daily (with meals) And 1 capsule with each snack tid.   Quantity:  63 capsule   Refills:  3       Potassium Chloride 40 MEQ/15ML (20%) Soln   Used for:  Hypokalemia   Started by:  JoselynLinda cedeño PA-C        Dose:  40 mEq   Take 15 mLs (40 mEq) by mouth daily   Quantity:  450 mL   Refills:  3            Where to get your medicines      These medications were sent to [facility] [address] [place] [address] [phone]  [address] [place] [address] 1-[address] [address]    Hours:  TRANSPLANT PHONE NUMBER [phone] Phone:  [phone]     amylase-lipase-protease 68382 UNITS Cpep    Potassium Chloride 40 MEQ/15ML (20%) Soln                Primary Care Provider Office Phone # Fax #    Jaguar Becker [phone]       [facility] [address] [facility] [address]        Equal Access to Services     [facility] AH: Hadii aad ku hadasho Soomaali, waaxda luqadaha, qaybta kaalmada adeegyada, waxay stella haynovan trudy willis. So [facility] [phone].    ATENCIÓN: Si habla español, tiene a cid disposición servicios gratuitos de asistencia lingüística. Llame al [phone].    We comply with applicable federal civil rights laws and [place] laws. We do not discriminate on the basis of race, color, national origin, age, disability sex, sexual orientation or gender identity.            Thank you!     Thank you for choosing [facility]  for your care. Our goal is always to provide you with excellent care. Hearing back from our patients is one way we can continue to improve our services. Please take a few minutes to complete the written survey that you may receive in the mail after your visit with us. Thank you!             Your Updated Medication  List - Protect others around you: Learn how to safely use, store and throw away your medicines at www.disposemymeds.org.          This list is accurate as of: 9/22/17  8:40 AM.  Always use your most recent med list.                   Brand Name Dispense Instructions for use Diagnosis    amylase-lipase-protease 66314 UNITS Cpep    CREON    63 capsule    Take 2 capsules (72,000 Units) by mouth 3 times daily (with meals) And 1 capsule with each snack tid.    Diarrhea, unspecified type, Peritoneal carcinomatosis (H), Cancer of sigmoid colon (H)       cholestyramine 4 G Packet    QUESTRAN     Take 2 packets by mouth 3 times daily (with meals)        diphenoxylate-atropine 2.5-0.025 MG per tablet    LOMOTIL    40 tablet    Take 1-2 tablets by mouth 4 times daily as needed for diarrhea    Diarrhea due to drug       IRON SUPPLEMENT PO      Take 325 mg by mouth 2 times daily (with meals)        loperamide 2 MG capsule    IMODIUM     Take 4 mg by mouth 4 times daily as needed for diarrhea        LORazepam 0.5 MG tablet    ATIVAN    30 tablet    Take 1 tablet (0.5 mg) by mouth every 4 hours as needed (Anxiety, Nausea/Vomiting or Sleep)    Peritoneal carcinomatosis (H), Cancer of sigmoid colon (H)       OMEPRAZOLE PO      Take 20 mg by mouth 2 times daily (before meals)        ondansetron 8 MG tablet    ZOFRAN    90 tablet    Take 1 tablet (8 mg) by mouth every 8 hours    Peritoneal carcinomatosis (H), Cancer of sigmoid colon (H)       opium tincture tincture     72 mL    Take 0.6 mLs (6 mg) by mouth 4 times daily    Diarrhea, unspecified type       Potassium Chloride 40 MEQ/15ML (20%) Soln     450 mL    Take 15 mLs (40 mEq) by mouth daily    Hypokalemia       prochlorperazine 10 MG tablet    COMPAZINE    30 tablet    Take 1 tablet (10 mg) by mouth every 6 hours as needed (Nausea/Vomiting)    Peritoneal carcinomatosis (H), Cancer of sigmoid colon (H)       Psyllium 51.7 % Pack    METAMUCIL FIBER    90 each    Take 1 packet by  mouth 3 times daily    Diarrhea, unspecified type

## 2017-09-22 NOTE — MR AVS SNAPSHOT
After Visit Summary   9/22/2017    Sebas Lopez    MRN: [id number]           Patient Information     Date Of Birth          1965        Visit Information        Provider Department      9/22/2017 8:30 AM UC 20 ATC;  ONCOLOGY INFUSION [facility]        Today's Diagnoses     Peritoneal carcinomatosis (H)    -  1    Cancer of sigmoid colon (H)          Care Instructions    Contact Numbers    [facility] Main Line: [phone]  [facility] Triage:  [phone]    Call triage with chills and/or temperature greater than or equal to 100.5, uncontrolled nausea/vomiting, diarrhea, constipation, dizziness, shortness of breath, chest pain, bleeding, unexplained bruising, or any new/concerning symptoms, questions/concerns.     If you are having any concerning symptoms or wish to speak to a provider before your next infusion visit, please call your care coordinator or triage to notify them so we can adequately serve you.       After Hours: [phone]    If after hours, weekends, or holidays, call main hospital  and ask for Oncology doctor on call.         September 2017 Sunday Monday Tuesday Wednesday Thursday Friday Saturday                            1     2       3     4     5     6     7     8     [facility] LAB DRAW    7:15 AM   (15 min.)    [facility] LAB DRAW   [facility] Lab Draw     [facility] RETURN    7:35 AM   (50 min.)   Linda Alves PA-C [facility] ONC INFUSION 240    8:30 AM   (240 min.)    ONCOLOGY INFUSION   [facility] 9       10     11     12     13     14     15     16       17     18     P NEW   10:15 AM   (60 min.)   [address]ime Jones RD   [facility] 19     20     21     22     [facility] LAB DRAW    7:15 AM   (15 min.)    [facility] LAB DRAW   [facility] Lab Draw     [facility] RETURN    7:35 AM   (50 min.)   Linda Alves PA-C [facility]  ONC INFUSION 240    8:30 AM   (240 min.)    ONCOLOGY INFUSION   [facility] 23       24     25     26     27     28     29     [facility] LAB DRAW   11:30 AM   (15 min.)    [facility] LAB DRAW   [facility] Lab Draw     P ONC INFUSION 120   12:00 PM   (120 min.)   UC ONCOLOGY INFUSION   [facility] 30 October 2017 Sunday Monday Tuesday Wednesday Thursday Friday Saturday   1     2     3     4     5     [facility] LAB DRAW   11:30 AM   (15 min.)    [facility] LAB DRAW   [facility] Lab Draw     [facility] RETURN   11:45 AM   (30 min.)   Albert Long MD   [facility] ONC INFUSION 240    1:00 PM   (240 min.)    ONCOLOGY INFUSION   [facility] 6     7       8     9     10     11     12     13     14       15     16     17     18     19     20     21       22     23     24     25     26     27     28       29     30     31                                      Lab Results:  Recent Results (from the past 12 hour(s))   Magnesium    Collection Time: 09/22/17  7:17 AM   Result Value Ref Range    Magnesium 1.8 1.6 - 2.3 mg/dL   CBC with platelets differential    Collection Time: 09/22/17  7:17 AM   Result Value Ref Range    WBC 7.3 4.0 - 11.0 10e9/L    RBC Count 4.06 (L) 4.4 - 5.9 10e12/L    Hemoglobin 10.8 (L) 13.3 - 17.7 g/dL    Hematocrit 32.6 (L) 40.0 - 53.0 %    MCV 80 78 - 100 fl    MCH 26.6 26.5 - 33.0 pg    MCHC 33.1 31.5 - 36.5 g/dL    RDW 24.1 (H) 10.0 - 15.0 %    Platelet Count 158 150 - 450 10e9/L    Diff Method Automated Method     % Neutrophils 58.4 %    % Lymphocytes 19.6 %    % Monocytes 18.7 %    % Eosinophils 2.2 %    % Basophils 0.7 %    % Immature Granulocytes 0.4 %    Nucleated RBCs 0 0 /100    Absolute Neutrophil 4.3 1.6 - 8.3 10e9/L    Absolute Lymphocytes 1.4 0.8 - 5.3 10e9/L    Absolute Monocytes 1.4 (H) 0.0 - 1.3 10e9/L    Absolute Eosinophils 0.2 0.0 - 0.7 10e9/L    Absolute Basophils 0.1  0.0 - 0.2 10e9/L    Abs Immature Granulocytes 0.0 0 - 0.4 10e9/L    Absolute Nucleated RBC 0.0    Comprehensive metabolic panel    Collection Time: 09/22/17  7:17 AM   Result Value Ref Range    Sodium 132 (L) 133 - 144 mmol/L    Potassium 3.0 (L) 3.4 - 5.3 mmol/L    Chloride 92 (L) 94 - 109 mmol/L    Carbon Dioxide 31 20 - 32 mmol/L    Anion Gap 9 3 - 14 mmol/L    Glucose 97 70 - 99 mg/dL    Urea Nitrogen 17 7 - 30 mg/dL    Creatinine 1.31 (H) 0.66 - 1.25 mg/dL    GFR Estimate 57 (L) >60 mL/min/1.7m2    GFR Estimate If Black 69 >60 mL/min/1.7m2    Calcium 9.0 8.5 - 10.1 mg/dL    Bilirubin Total 0.6 0.2 - 1.3 mg/dL    Albumin 3.2 (L) 3.4 - 5.0 g/dL    Protein Total 8.0 6.8 - 8.8 g/dL    Alkaline Phosphatase 144 40 - 150 U/L    ALT 39 0 - 70 U/L    AST 42 0 - 45 U/L               Follow-ups after your visit        Your next 10 appointments already scheduled     Sep 29, 2017 11:30 AM CDT   [facility] Lab Draw with  [facility] LAB DRAW   [facility])    [address] [place] [address] [id number] [phone]            Sep 29, 2017 12:00 PM CDT   Infusion 120 with UC ONCOLOGY INFUSION, UC 30 ATC   [facility])    [address] [place] [id number] [phone]            Oct 05, 2017 11:30 AM CDT   [facility] Lab Draw with  [facility] LAB DRAW   [facility] Lab Draw ([facility])    [address] [place] [address] [id number] [phone]            Oct 05, 2017 12:00 PM CDT   (Arrive by 11:45 AM)   Return Visit with Albert Long MD   [facility] ([facility])    [address] [place] [address] [phone]            Oct 05, 2017  1:00 PM CDT   Infusion 240 with UC ONCOLOGY INFUSION,  10 [facility]  [place])    [address] [facility]  2nd [facility] [address] [phone]              Future tests that were ordered for you today     Open Future Orders        Priority Expected Expires Ordered    Comprehensive metabolic panel Routine 9/29/2017 9/22/2018 9/22/2017            Who to contact     If you have questions or need follow up information about today's clinic visit or your schedule please contact [facility] directly at [phone].  Normal or non-critical lab and imaging results will be communicated to you by Dumbstruckhart, letter or phone within 4 business days after the clinic has received the results. If you do not hear from us within 7 days, please contact the clinic through [employer] or phone. If you have a critical or abnormal lab result, we will notify you by phone as soon as possible.  Submit refill requests through [employer] or call your pharmacy and they will forward the refill request to us. Please allow 3 business days for your refill to be completed.          Additional Information About Your Visit        [employer]mits Information     [employer] gives you secure access to your electronic health record. If you see a primary care provider, you can also send messages to your care team and make appointments. If you have questions, please call your primary care clinic.  If you do not have a primary care provider, please call [phone] and they will assist you.        Care EveryWhere ID     This is your Care EveryWhere ID. This could be used by other organizations to access your [place] medical records  [id number]         Blood Pressure from Last 3 Encounters:   09/22/17 117/78   09/08/17 126/86   08/25/17 120/75    Weight from Last 3 Encounters:   09/22/17 70.2 kg (154 lb 12.8 oz)   09/08/17 70.7 kg (155 lb 12.8 oz)   08/25/17 73.6 kg (162 lb 4.1 oz)              We Performed the Following     CBC with platelets differential     Comprehensive metabolic panel     Magnesium           Today's Medication Changes          These changes are accurate as of: 9/22/17  9:15 AM.  If you have any questions, ask your nurse or doctor.               Start taking these medicines.        Dose/Directions    amylase-lipase-protease 11086 UNITS Cpep   Commonly known as:  CREON   Used for:  Diarrhea, unspecified type, Peritoneal carcinomatosis (H), Cancer of sigmoid colon (H)   Started by:  Linda Alves PA-C        Dose:  2 capsule   Take 2 capsules (72,000 Units) by mouth 3 times daily (with meals) And 1 capsule with each snack tid.   Quantity:  63 capsule   Refills:  3       Potassium Chloride 40 MEQ/15ML (20%) Soln   Used for:  Hypokalemia   Started by:  Linda Alves PA-C        Dose:  40 mEq   Take 15 mLs (40 mEq) by mouth daily   Quantity:  450 mL   Refills:  3            Where to get your medicines      These medications were sent to [address] [place] [address] [address] [place] [id number]    Hours:  TRANSPLANT PHONE NUMBER [phone] Phone:  [phone]     amylase-lipase-protease 90088 UNITS Cpep    Potassium Chloride 40 MEQ/15ML (20%) Soln                Primary Care Provider Office Phone # Fax #    Jaguar Becker [phone]       [facility] [address] [place] [phone]        Equal Access to Services     [facility] AH: Hadii karrie ku hadasho Soshu, waaxda luqadaha, qaybta kaalmada adeegyada, bhargav sage . So [facility] [phone].    ATENCIÓN: Si habla español, tiene a cid disposición servicios gratuitos de asistencia lingüística. Llame al [phone].    We comply with applicable federal civil rights laws and [place] laws. We do not discriminate on the basis of race, color, national origin, age, disability sex, sexual orientation or gender identity.            Thank you!     Thank you for choosing [facility]  for your care. Our  goal is always to provide you with excellent care. Hearing back from our patients is one way we can continue to improve our services. Please take a few minutes to complete the written survey that you may receive in the mail after your visit with us. Thank you!             Your Updated Medication List - Protect others around you: Learn how to safely use, store and throw away your medicines at www.disposemymeds.org.          This list is accurate as of: 9/22/17  9:15 AM.  Always use your most recent med list.                   Brand Name Dispense Instructions for use Diagnosis    amylase-lipase-protease 74236 UNITS Cpep    CREON    63 capsule    Take 2 capsules (72,000 Units) by mouth 3 times daily (with meals) And 1 capsule with each snack tid.    Diarrhea, unspecified type, Peritoneal carcinomatosis (H), Cancer of sigmoid colon (H)       cholestyramine 4 G Packet    QUESTRAN     Take 2 packets by mouth 3 times daily (with meals)        diphenoxylate-atropine 2.5-0.025 MG per tablet    LOMOTIL    40 tablet    Take 1-2 tablets by mouth 4 times daily as needed for diarrhea    Diarrhea due to drug       IRON SUPPLEMENT PO      Take 325 mg by mouth 2 times daily (with meals)        loperamide 2 MG capsule    IMODIUM     Take 4 mg by mouth 4 times daily as needed for diarrhea        LORazepam 0.5 MG tablet    ATIVAN    30 tablet    Take 1 tablet (0.5 mg) by mouth every 4 hours as needed (Anxiety, Nausea/Vomiting or Sleep)    Peritoneal carcinomatosis (H), Cancer of sigmoid colon (H)       OMEPRAZOLE PO      Take 20 mg by mouth 2 times daily (before meals)        ondansetron 8 MG tablet    ZOFRAN    90 tablet    Take 1 tablet (8 mg) by mouth every 8 hours    Peritoneal carcinomatosis (H), Cancer of sigmoid colon (H)       opium tincture tincture     72 mL    Take 0.6 mLs (6 mg) by mouth 4 times daily    Diarrhea, unspecified type       Potassium Chloride 40 MEQ/15ML (20%) Soln     450 mL    Take 15 mLs (40 mEq) by mouth  daily    Hypokalemia       prochlorperazine 10 MG tablet    COMPAZINE    30 tablet    Take 1 tablet (10 mg) by mouth every 6 hours as needed (Nausea/Vomiting)    Peritoneal carcinomatosis (H), Cancer of sigmoid colon (H)       Psyllium 51.7 % Pack    METAMUCIL FIBER    90 each    Take 1 packet by mouth 3 times daily    Diarrhea, unspecified type

## 2017-09-29 ENCOUNTER — INFUSION THERAPY VISIT (OUTPATIENT)
Dept: ONCOLOGY | Facility: CLINIC | Age: 52
End: 2017-09-29
Attending: INTERNAL MEDICINE
Payer: COMMERCIAL

## 2017-09-29 ENCOUNTER — APPOINTMENT (OUTPATIENT)
Dept: LAB | Facility: CLINIC | Age: 52
End: 2017-09-29
Attending: INTERNAL MEDICINE
Payer: COMMERCIAL

## 2017-09-29 VITALS
TEMPERATURE: 98.4 F | DIASTOLIC BLOOD PRESSURE: 86 MMHG | SYSTOLIC BLOOD PRESSURE: 125 MMHG | RESPIRATION RATE: 16 BRPM | OXYGEN SATURATION: 98 % | WEIGHT: 160.3 LBS | BODY MASS INDEX: 23.66 KG/M2 | HEART RATE: 84 BPM

## 2017-09-29 DIAGNOSIS — R19.7 DIARRHEA, UNSPECIFIED TYPE: ICD-10-CM

## 2017-09-29 DIAGNOSIS — C18.7 CANCER OF SIGMOID COLON (H): Primary | ICD-10-CM

## 2017-09-29 DIAGNOSIS — E87.6 HYPOKALEMIA: ICD-10-CM

## 2017-09-29 LAB
ALBUMIN SERPL-MCNC: 2.8 G/DL (ref 3.4–5)
ALP SERPL-CCNC: 102 U/L (ref 40–150)
ALT SERPL W P-5'-P-CCNC: 37 U/L (ref 0–70)
ANION GAP SERPL CALCULATED.3IONS-SCNC: 6 MMOL/L (ref 3–14)
AST SERPL W P-5'-P-CCNC: 38 U/L (ref 0–45)
BASOPHILS # BLD AUTO: 0 10E9/L (ref 0–0.2)
BASOPHILS NFR BLD AUTO: 0.7 %
BILIRUB SERPL-MCNC: 0.6 MG/DL (ref 0.2–1.3)
BUN SERPL-MCNC: 13 MG/DL (ref 7–30)
CALCIUM SERPL-MCNC: 8.6 MG/DL (ref 8.5–10.1)
CHLORIDE SERPL-SCNC: 108 MMOL/L (ref 94–109)
CO2 SERPL-SCNC: 24 MMOL/L (ref 20–32)
CREAT SERPL-MCNC: 0.9 MG/DL (ref 0.66–1.25)
DIFFERENTIAL METHOD BLD: ABNORMAL
EOSINOPHIL # BLD AUTO: 0.4 10E9/L (ref 0–0.7)
EOSINOPHIL NFR BLD AUTO: 6.6 %
ERYTHROCYTE [DISTWIDTH] IN BLOOD BY AUTOMATED COUNT: 26 % (ref 10–15)
GFR SERPL CREATININE-BSD FRML MDRD: 88 ML/MIN/1.7M2
GLUCOSE SERPL-MCNC: 103 MG/DL (ref 70–99)
HCT VFR BLD AUTO: 31.3 % (ref 40–53)
HGB BLD-MCNC: 10.1 G/DL (ref 13.3–17.7)
IMM GRANULOCYTES # BLD: 0 10E9/L (ref 0–0.4)
IMM GRANULOCYTES NFR BLD: 0.3 %
LYMPHOCYTES # BLD AUTO: 2.1 10E9/L (ref 0.8–5.3)
LYMPHOCYTES NFR BLD AUTO: 36 %
MCH RBC QN AUTO: 26.6 PG (ref 26.5–33)
MCHC RBC AUTO-ENTMCNC: 32.3 G/DL (ref 31.5–36.5)
MCV RBC AUTO: 83 FL (ref 78–100)
MONOCYTES # BLD AUTO: 0.6 10E9/L (ref 0–1.3)
MONOCYTES NFR BLD AUTO: 11 %
NEUTROPHILS # BLD AUTO: 2.6 10E9/L (ref 1.6–8.3)
NEUTROPHILS NFR BLD AUTO: 45.4 %
NRBC # BLD AUTO: 0 10*3/UL
NRBC BLD AUTO-RTO: 0 /100
PLATELET # BLD AUTO: 128 10E9/L (ref 150–450)
POTASSIUM SERPL-SCNC: 4.6 MMOL/L (ref 3.4–5.3)
PROT SERPL-MCNC: 7.6 G/DL (ref 6.8–8.8)
RBC # BLD AUTO: 3.79 10E12/L (ref 4.4–5.9)
SODIUM SERPL-SCNC: 138 MMOL/L (ref 133–144)
WBC # BLD AUTO: 5.7 10E9/L (ref 4–11)

## 2017-09-29 PROCEDURE — 85025 COMPLETE CBC W/AUTO DIFF WBC: CPT | Performed by: INTERNAL MEDICINE

## 2017-09-29 PROCEDURE — 25000128 H RX IP 250 OP 636: Mod: ZF | Performed by: PHYSICIAN ASSISTANT

## 2017-09-29 PROCEDURE — 25000128 H RX IP 250 OP 636: Mod: ZF | Performed by: INTERNAL MEDICINE

## 2017-09-29 PROCEDURE — 80053 COMPREHEN METABOLIC PANEL: CPT | Performed by: INTERNAL MEDICINE

## 2017-09-29 PROCEDURE — 96360 HYDRATION IV INFUSION INIT: CPT

## 2017-09-29 RX ORDER — HEPARIN SODIUM (PORCINE) LOCK FLUSH IV SOLN 100 UNIT/ML 100 UNIT/ML
5 SOLUTION INTRAVENOUS ONCE
Status: COMPLETED | OUTPATIENT
Start: 2017-09-29 | End: 2017-09-29

## 2017-09-29 RX ORDER — HEPARIN SODIUM (PORCINE) LOCK FLUSH IV SOLN 100 UNIT/ML 100 UNIT/ML
500 SOLUTION INTRAVENOUS ONCE
Status: COMPLETED | OUTPATIENT
Start: 2017-09-29 | End: 2017-09-29

## 2017-09-29 RX ADMIN — SODIUM CHLORIDE, PRESERVATIVE FREE 5 ML: 5 INJECTION INTRAVENOUS at 11:53

## 2017-09-29 RX ADMIN — SODIUM CHLORIDE 1000 ML: 9 INJECTION, SOLUTION INTRAVENOUS at 12:30

## 2017-09-29 RX ADMIN — SODIUM CHLORIDE, PRESERVATIVE FREE 500 UNITS: 5 INJECTION INTRAVENOUS at 13:35

## 2017-09-29 ASSESSMENT — PAIN SCALES - GENERAL: PAINLEVEL: NO PAIN (0)

## 2017-09-29 NOTE — NURSING NOTE
"Chief Complaint   Patient presents with     Port Draw     Labs drawn from port by RN. Line flushed with saline and heparin. Vitals taken and pt checked in for appt     Port accessed with 20g 3/4\" gripper needle by RN, labs collected, line flushed with saline and heparin.  Vitals taken. Pt checked in for appointment(s).    Cayla Martell RN  "

## 2017-09-29 NOTE — MR AVS SNAPSHOT
After Visit Summary   9/29/2017    Sebas Lopez    MRN: [id number]           Patient Information     Date Of Birth          1965        Visit Information        Provider Department      9/29/2017 12:00 PM  30 ATC;  ONCOLOGY INFUSION [facility]        Today's Diagnoses     Cancer of sigmoid colon (H)    -  1    Diarrhea, unspecified type        Hypokalemia          Care Instructions    Contact Numbers  [facility]: [phone]  (Choose Option 3 for triage RN)  After Hours: [phone]    Call triage with chills and/or temperature greater than or equal to 100.5, uncontrolled nausea/vomiting, diarrhea, constipation, dizziness, shortness of breath, chest pain, bleeding, unexplained bruising, or any new/concerning symptoms, questions/concerns.     If after hours, weekends, or holidays, call the main clinic number. Calls will be forwarded to the hospital , please ask for the adult oncology doctor on call.     If you are having any concerning symptoms or wish to speak to a provider before your next infusion visit, please call your care coordinator or triage to notify them so we can adequately serve you.     If you need a refill on a narcotic prescription, please call triage or your care coordinator before your infusion appointment.             September 2017 Sunday Monday Tuesday Wednesday Thursday Friday Saturday                            1     2       3     4     5     6     7     8     [facility] LAB DRAW    7:15 AM   (15 min.)   [facility] LAB DRAW   [facility] Lab Draw     [facility] RETURN    7:35 AM   (50 min.)   Linda Alves PA-C [facility] ONC INFUSION 240    8:30 AM   (240 min.)    ONCOLOGY INFUSION   [facility] 9       10     11     12     13     14     15     16       17     18     [facility] NEW   10:15 AM   (60 min.)   Karime Jones RD   [facility]  19     20     21     22     [facility] DRAW    7:15 AM   (15 min.)    [facility] LAB DRAW   [facility] Lab Draw     [facility] RETURN    7:35 AM   (50 min.)   Linda [facility]ves [facility]-C   [facility]    8:30 AM   (240 min.)    ONCOLOGY INFUSION   [facility] 23       24     25     26     27     28     29     [facility] LAB DRAW   11:30 AM   (15 min.)    [facility]RAW   [facility]     [facility] ONC INFUSION 120   12:00 PM   (120 min.)    ONCOLOGY INFUSION   [facility] 30 October 2017 Sunday Monday Tuesday Wednesday Thursday Friday Saturday   1     2     3     4     5     [facility] LAB DRAW   11:30 AM   (15 min.)    [facility] LAB DRAW   [facility] Lab Draw     [facility] RETURN   11:45 AM   (30 min.)   Albert Long MD   [facility]    1:00 PM   (240 min.)    ONCOLOGY INFUSION   [facility] 6     7       8     9     10     11     12     13     14       15     16     17     18     19     20     21       22     23     24     25     26     27     28       29     30     31                                      Lab Results:  Recent Results (from the past 12 hour(s))   CBC with platelets differential    Collection Time: 09/29/17 11:57 AM   Result Value Ref Range    WBC 5.7 4.0 - 11.0 10e9/L    RBC Count 3.79 (L) 4.4 - 5.9 10e12/L    Hemoglobin 10.1 (L) 13.3 - 17.7 g/dL    Hematocrit 31.3 (L) 40.0 - 53.0 %    MCV 83 78 - 100 fl    MCH 26.6 26.5 - 33.0 pg    MCHC 32.3 31.5 - 36.5 g/dL    RDW 26.0 (H) 10.0 - 15.0 %    Platelet Count 128 (L) 150 - 450 10e9/L    Diff Method Automated Method     % Neutrophils 45.4 %    % Lymphocytes 36.0 %    % Monocytes 11.0 %    % Eosinophils 6.6 %    % Basophils 0.7 %    % Immature Granulocytes 0.3 %    Nucleated RBCs 0 0 /100    Absolute Neutrophil 2.6 1.6 - 8.3 10e9/L    Absolute Lymphocytes 2.1 0.8 - 5.3  10e9/L    Absolute Monocytes 0.6 0.0 - 1.3 10e9/L    Absolute Eosinophils 0.4 0.0 - 0.7 10e9/L    Absolute Basophils 0.0 0.0 - 0.2 10e9/L    Abs Immature Granulocytes 0.0 0 - 0.4 10e9/L    Absolute Nucleated RBC 0.0    Comprehensive metabolic panel    Collection Time: 09/29/17 11:57 AM   Result Value Ref Range    Sodium 138 133 - 144 mmol/L    Potassium 4.6 3.4 - 5.3 mmol/L    Chloride 108 94 - 109 mmol/L    Carbon Dioxide 24 20 - 32 mmol/L    Anion Gap 6 3 - 14 mmol/L    Glucose 103 (H) 70 - 99 mg/dL    Urea Nitrogen 13 7 - 30 mg/dL    Creatinine 0.90 0.66 - 1.25 mg/dL    GFR Estimate 88 >60 mL/min/1.7m2    GFR Estimate If Black >90 >60 mL/min/1.7m2    Calcium 8.6 8.5 - 10.1 mg/dL    Bilirubin Total 0.6 0.2 - 1.3 mg/dL    Albumin 2.8 (L) 3.4 - 5.0 g/dL    Protein Total 7.6 6.8 - 8.8 g/dL    Alkaline Phosphatase 102 40 - 150 U/L    ALT 37 0 - 70 U/L    AST 38 0 - 45 U/L               Follow-ups after your visit        Your next 10 appointments already scheduled     Oct 05, 2017 11:30 AM CDT   [facility]   [facility] Lab Draw ([facility])    [address] [place] [address]  2nd [facility] [address] [phone]            Oct 05, 2017 12:00 PM CDT   (Arrive by 11:45 AM)   Return Visit with Albert Long MD   [facility] ([facility])    [address] [place] [address]  2nd [facility] [address] [phone]            Oct 05, 2017  1:00 PM CDT   Infusion 240 with  ONCOLOGY INFUSION, UC 10 ATC   [facility] ([facility])    [address] [place] [address]  2nd [facility] [address] [phone]              Who to contact     If you have questions or need follow up information about today's clinic visit or your schedule please contact [facility] directly at [phone].  Normal or non-critical lab and imaging results will be communicated to  you by MyChart, letter or phone within 4 business days after the clinic has received the results. If you do not hear from us within 7 days, please contact the clinic through [employer] or phone. If you have a critical or abnormal lab result, we will notify you by phone as soon as possible.  Submit refill requests through [employer] or call your pharmacy and they will forward the refill request to us. Please allow 3 business days for your refill to be completed.          Additional Information About Your Visit        [employer] Information     [employer] gives you secure access to your electronic health record. If you see a primary care provider, you can also send messages to your care team and make appointments. If you have questions, please call your primary care clinic.  If you do not have a primary care provider, please call [phone] and they will assist you.        Care EveryWhere ID     This is your Care EveryWhere ID. This could be used by other organizations to access your [place] medical records  [id number]        Your Vitals Were     Pulse Temperature Respirations Pulse Oximetry BMI (Body Mass Index)       84 98.4  F (36.9  C) (Oral) 16 98% 23.66 kg/m2        Blood Pressure from Last 3 Encounters:   09/29/17 125/86   09/22/17 117/78   09/08/17 126/86    Weight from Last 3 Encounters:   09/29/17 72.7 kg (160 lb 4.8 oz)   09/22/17 70.2 kg (154 lb 12.8 oz)   09/08/17 70.7 kg (155 lb 12.8 oz)              We Performed the Following     CBC with platelets differential     Comprehensive metabolic panel        Primary Care Provider Office Phone # Fax #    Jaguar Becker [phone]       [facility] [address]  [facility] [address]        Equal Access to Services     MELVIN DUNN : Hadii karrie Funk, wahayderda luqadaha, qaybta kaalmada lewis, bhargav willis. So [facility] [phone].    ATENCIÓN: Si habla español, tiene a cid disposición servicios gratuitos de  asistencia lingüística. Kenan al [phone].    We comply with applicable federal civil rights laws and [place] laws. We do not discriminate on the basis of race, color, national origin, age, disability, sex, sexual orientation, or gender identity.            Thank you!     Thank you for choosing [facility]ER CLINIC  for your care. Our goal is always to provide you with excellent care. Hearing back from our patients is one way we can continue to improve our services. Please take a few minutes to complete the written survey that you may receive in the mail after your visit with us. Thank you!             Your Updated Medication List - Protect others around you: Learn how to safely use, store and throw away your medicines at www.disposemymeds.org.          This list is accurate as of: 9/29/17  2:50 PM.  Always use your most recent med list.                   Brand Name Dispense Instructions for use Diagnosis    amylase-lipase-protease 99888 UNITS Cpep    CREON    63 capsule    Take 2 capsules (72,000 Units) by mouth 3 times daily (with meals) And 1 capsule with each snack tid.    Diarrhea, unspecified type, Peritoneal carcinomatosis (H), Cancer of sigmoid colon (H)       cholestyramine 4 G Packet    QUESTRAN     Take 2 packets by mouth 3 times daily (with meals)        diphenoxylate-atropine 2.5-0.025 MG per tablet    LOMOTIL    40 tablet    Take 1-2 tablets by mouth 4 times daily as needed for diarrhea    Diarrhea due to drug       IRON SUPPLEMENT PO      Take 325 mg by mouth 2 times daily (with meals)        loperamide 2 MG capsule    IMODIUM     Take 4 mg by mouth 4 times daily as needed for diarrhea        LORazepam 0.5 MG tablet    ATIVAN    30 tablet    Take 1 tablet (0.5 mg) by mouth every 4 hours as needed (Anxiety, Nausea/Vomiting or Sleep)    Peritoneal carcinomatosis (H), Cancer of sigmoid colon (H)       OMEPRAZOLE PO      Take 20 mg by mouth 2 times daily (before meals)        ondansetron 8  MG tablet    ZOFRAN    90 tablet    Take 1 tablet (8 mg) by mouth every 8 hours    Peritoneal carcinomatosis (H), Cancer of sigmoid colon (H)       opium tincture tincture     72 mL    Take 0.6 mLs (6 mg) by mouth 4 times daily    Diarrhea, unspecified type       Potassium Chloride 40 MEQ/15ML (20%) Soln     450 mL    Take 15 mLs (40 mEq) by mouth daily    Hypokalemia       prochlorperazine 10 MG tablet    COMPAZINE    30 tablet    Take 1 tablet (10 mg) by mouth every 6 hours as needed (Nausea/Vomiting)    Peritoneal carcinomatosis (H), Cancer of sigmoid colon (H)       Psyllium 51.7 % Pack    METAMUCIL FIBER    90 each    Take 1 packet by mouth 3 times daily    Diarrhea, unspecified type

## 2017-09-29 NOTE — PATIENT INSTRUCTIONS
Contact Numbers  [facility]: [phone]  (Choose Option 3 for triage RN)  After Hours: [phone]    Call triage with chills and/or temperature greater than or equal to 100.5, uncontrolled nausea/vomiting, diarrhea, constipation, dizziness, shortness of breath, chest pain, bleeding, unexplained bruising, or any new/concerning symptoms, questions/concerns.     If after hours, weekends, or holidays, call the main clinic number. Calls will be forwarded to the hospital , please ask for the adult oncology doctor on call.     If you are having any concerning symptoms or wish to speak to a provider before your next infusion visit, please call your care coordinator or triage to notify them so we can adequately serve you.     If you need a refill on a narcotic prescription, please call triage or your care coordinator before your infusion appointment.             September 2017 Sunday Monday Tuesday Wednesday Thursday Friday Saturday                            1     2       3     4     5     6     7     8     [facility]    7:15 AM   (15 min.)    [facility]   [facility]     [facility] RETURN    7:35 AM   (50 min.)   Linda Alves PA-C [facility] ONC INFUSION 240    8:30 AM   (240 min.)   [facility] ONCOL[facility]Y INFUSION   [facility] 9       10     11     12     13     14     15     16       17     18     [facility] NEW   10:15 AM   (60 min.)   Karime Jones RD   [facility] 19     20     21     22     [facility]    7:15 AM   (15 min.)    [facility]     P RETURN    7:35 AM   (50 min.)   [facility]    8:30 AM   (240 min.)   [facility] INFUSION   [facility] 23       24     25     26     27     28     29     [facility]   11:30 AM   (15 min.)   [facility]   [facility] Lab Draw     P ONC INFUSION 120   12:00 PM   (120 min.)    ONCOLOGY INFUSION   [facility] 30 October 2017 Sunday Monday Tuesday Wednesday Thursday Friday Saturday   1     2     3     4     5     [facility] DRAW   11:30 AM   (15 min.)    [facility] D[facility]   [facility] Lab Draw     [facility] RETURN   11:45 AM   (30 min.)   Albert Long MD   [facility] ONC INFUSION 240    1:00 PM   (240 min.)    ONCOLOGY INFUSION   [facility] 6     7       8     9     10     11     12     13     14       15     16     17     18     19     20     21       22     23     24     25     26     27     28       29     30     31                                      Lab Results:  Recent Results (from the past 12 hour(s))   CBC with platelets differential    Collection Time: 09/29/17 11:57 AM   Result Value Ref Range    WBC 5.7 4.0 - 11.0 10e9/L    RBC Count 3.79 (L) 4.4 - 5.9 10e12/L    Hemoglobin 10.1 (L) 13.3 - 17.7 g/dL    Hematocrit 31.3 (L) 40.0 - 53.0 %    MCV 83 78 - 100 fl    MCH 26.6 26.5 - 33.0 pg    MCHC 32.3 31.5 - 36.5 g/dL    RDW 26.0 (H) 10.0 - 15.0 %    Platelet Count 128 (L) 150 - 450 10e9/L    Diff Method Automated Method     % Neutrophils 45.4 %    % Lymphocytes 36.0 %    % Monocytes 11.0 %    % Eosinophils 6.6 %    % Basophils 0.7 %    % Immature Granulocytes 0.3 %    Nucleated RBCs 0 0 /100    Absolute Neutrophil 2.6 1.6 - 8.3 10e9/L    Absolute Lymphocytes 2.1 0.8 - 5.3 10e9/L    Absolute Monocytes 0.6 0.0 - 1.3 10e9/L    Absolute Eosinophils 0.4 0.0 - 0.7 10e9/L    Absolute Basophils 0.0 0.0 - 0.2 10e9/L    Abs Immature Granulocytes 0.0 0 - 0.4 10e9/L    Absolute Nucleated RBC 0.0    Comprehensive metabolic panel    Collection Time: 09/29/17 11:57 AM   Result Value Ref Range    Sodium 138 133 - 144 mmol/L    Potassium 4.6 3.4 - 5.3 mmol/L    Chloride 108 94 - 109 mmol/L    Carbon Dioxide 24 20 - 32  mmol/L    Anion Gap 6 3 - 14 mmol/L    Glucose 103 (H) 70 - 99 mg/dL    Urea Nitrogen 13 7 - 30 mg/dL    Creatinine 0.90 0.66 - 1.25 mg/dL    GFR Estimate 88 >60 mL/min/1.7m2    GFR Estimate If Black >90 >60 mL/min/1.7m2    Calcium 8.6 8.5 - 10.1 mg/dL    Bilirubin Total 0.6 0.2 - 1.3 mg/dL    Albumin 2.8 (L) 3.4 - 5.0 g/dL    Protein Total 7.6 6.8 - 8.8 g/dL    Alkaline Phosphatase 102 40 - 150 U/L    ALT 37 0 - 70 U/L    AST 38 0 - 45 U/L

## 2017-09-29 NOTE — PROGRESS NOTES
Infusion Nursing Note:  Sebas Lopez presents today for IV fluids and labs.    Patient seen by provider today: No    Note: Sebas reports doing well today. He states that his ileostomy output has slowed down significantly this past week. He feels more hydrated and does not offer any concerns today.     Intravenous Access:  Implanted Port.    Treatment Conditions:  Lab Results   Component Value Date    HGB 10.1 09/29/2017     Lab Results   Component Value Date    WBC 5.7 09/29/2017      Lab Results   Component Value Date    ANEU 2.6 09/29/2017     Lab Results   Component Value Date     09/29/2017      Lab Results   Component Value Date     09/29/2017                   Lab Results   Component Value Date    POTASSIUM 4.6 09/29/2017           Lab Results   Component Value Date    MAG 1.8 09/22/2017            Lab Results   Component Value Date    CR 0.90 09/29/2017                   Lab Results   Component Value Date    ANNAMARIE 8.6 09/29/2017                Lab Results   Component Value Date    BILITOTAL 0.6 09/29/2017           Lab Results   Component Value Date    ALBUMIN 2.8 09/29/2017                    Lab Results   Component Value Date    ALT 37 09/29/2017           Lab Results   Component Value Date    AST 38 09/29/2017     NO potassium replacement needed today.     Post Infusion Assessment:  Patient tolerated infusion without incident.  Blood return noted pre and post infusion.  Access discontinued per protocol.    Discharge Plan:   Patient declined prescription refills.  Copy of AVS reviewed with patient.  Patient will return 10/05 for next appointment.  Patient discharged in stable condition accompanied by: self.  Departure Mode: Ambulatory.    Haleigh Chatman RN

## 2017-10-05 ENCOUNTER — ONCOLOGY VISIT (OUTPATIENT)
Dept: ONCOLOGY | Facility: CLINIC | Age: 52
End: 2017-10-05
Attending: INTERNAL MEDICINE
Payer: COMMERCIAL

## 2017-10-05 ENCOUNTER — APPOINTMENT (OUTPATIENT)
Dept: LAB | Facility: CLINIC | Age: 52
End: 2017-10-05
Attending: INTERNAL MEDICINE
Payer: COMMERCIAL

## 2017-10-05 VITALS
WEIGHT: 155.9 LBS | RESPIRATION RATE: 16 BRPM | OXYGEN SATURATION: 98 % | DIASTOLIC BLOOD PRESSURE: 83 MMHG | BODY MASS INDEX: 23.01 KG/M2 | HEART RATE: 91 BPM | SYSTOLIC BLOOD PRESSURE: 126 MMHG | TEMPERATURE: 99 F

## 2017-10-05 DIAGNOSIS — C18.7 CANCER OF SIGMOID COLON (H): ICD-10-CM

## 2017-10-05 DIAGNOSIS — C78.6 PERITONEAL CARCINOMATOSIS (H): Primary | ICD-10-CM

## 2017-10-05 DIAGNOSIS — C78.6 PERITONEAL CARCINOMATOSIS (H): ICD-10-CM

## 2017-10-05 DIAGNOSIS — R19.7 DIARRHEA, UNSPECIFIED TYPE: ICD-10-CM

## 2017-10-05 DIAGNOSIS — C18.7 CANCER OF SIGMOID COLON (H): Primary | ICD-10-CM

## 2017-10-05 LAB
ALBUMIN SERPL-MCNC: 2.8 G/DL (ref 3.4–5)
ALP SERPL-CCNC: 131 U/L (ref 40–150)
ALT SERPL W P-5'-P-CCNC: 47 U/L (ref 0–70)
ANION GAP SERPL CALCULATED.3IONS-SCNC: 7 MMOL/L (ref 3–14)
AST SERPL W P-5'-P-CCNC: 52 U/L (ref 0–45)
BASOPHILS # BLD AUTO: 0 10E9/L (ref 0–0.2)
BASOPHILS NFR BLD AUTO: 0.7 %
BILIRUB SERPL-MCNC: 0.6 MG/DL (ref 0.2–1.3)
BUN SERPL-MCNC: 12 MG/DL (ref 7–30)
CALCIUM SERPL-MCNC: 9.1 MG/DL (ref 8.5–10.1)
CHLORIDE SERPL-SCNC: 103 MMOL/L (ref 94–109)
CO2 SERPL-SCNC: 24 MMOL/L (ref 20–32)
CREAT SERPL-MCNC: 1.05 MG/DL (ref 0.66–1.25)
DIFFERENTIAL METHOD BLD: ABNORMAL
EOSINOPHIL # BLD AUTO: 0.3 10E9/L (ref 0–0.7)
EOSINOPHIL NFR BLD AUTO: 4.6 %
ERYTHROCYTE [DISTWIDTH] IN BLOOD BY AUTOMATED COUNT: 26.5 % (ref 10–15)
GFR SERPL CREATININE-BSD FRML MDRD: 74 ML/MIN/1.7M2
GLUCOSE SERPL-MCNC: 90 MG/DL (ref 70–99)
HCT VFR BLD AUTO: 30.7 % (ref 40–53)
HGB BLD-MCNC: 10.1 G/DL (ref 13.3–17.7)
IMM GRANULOCYTES # BLD: 0 10E9/L (ref 0–0.4)
IMM GRANULOCYTES NFR BLD: 0.2 %
LYMPHOCYTES # BLD AUTO: 1.8 10E9/L (ref 0.8–5.3)
LYMPHOCYTES NFR BLD AUTO: 30.2 %
MAGNESIUM SERPL-MCNC: 1.3 MG/DL (ref 1.6–2.3)
MCH RBC QN AUTO: 27.2 PG (ref 26.5–33)
MCHC RBC AUTO-ENTMCNC: 32.9 G/DL (ref 31.5–36.5)
MCV RBC AUTO: 83 FL (ref 78–100)
MONOCYTES # BLD AUTO: 0.8 10E9/L (ref 0–1.3)
MONOCYTES NFR BLD AUTO: 13.8 %
NEUTROPHILS # BLD AUTO: 3 10E9/L (ref 1.6–8.3)
NEUTROPHILS NFR BLD AUTO: 50.5 %
NRBC # BLD AUTO: 0 10*3/UL
NRBC BLD AUTO-RTO: 0 /100
PLATELET # BLD AUTO: 138 10E9/L (ref 150–450)
POTASSIUM SERPL-SCNC: 4.8 MMOL/L (ref 3.4–5.3)
PROT SERPL-MCNC: 7.9 G/DL (ref 6.8–8.8)
RBC # BLD AUTO: 3.72 10E12/L (ref 4.4–5.9)
SODIUM SERPL-SCNC: 134 MMOL/L (ref 133–144)
WBC # BLD AUTO: 5.9 10E9/L (ref 4–11)

## 2017-10-05 PROCEDURE — 99215 OFFICE O/P EST HI 40 MIN: CPT | Mod: ZP | Performed by: INTERNAL MEDICINE

## 2017-10-05 PROCEDURE — 99212 OFFICE O/P EST SF 10 MIN: CPT | Mod: ZF

## 2017-10-05 PROCEDURE — 25000128 H RX IP 250 OP 636: Mod: ZF | Performed by: INTERNAL MEDICINE

## 2017-10-05 RX ORDER — HEPARIN SODIUM (PORCINE) LOCK FLUSH IV SOLN 100 UNIT/ML 100 UNIT/ML
5 SOLUTION INTRAVENOUS EVERY 8 HOURS
Status: DISCONTINUED | OUTPATIENT
Start: 2017-10-05 | End: 2017-10-05 | Stop reason: HOSPADM

## 2017-10-05 RX ORDER — SODIUM CHLORIDE 9 MG/ML
1000 INJECTION, SOLUTION INTRAVENOUS CONTINUOUS PRN
Status: CANCELLED
Start: 2017-10-06

## 2017-10-05 RX ORDER — FLUOROURACIL 50 MG/ML
400 INJECTION, SOLUTION INTRAVENOUS ONCE
Status: CANCELLED | OUTPATIENT
Start: 2017-10-06

## 2017-10-05 RX ORDER — EPINEPHRINE 0.3 MG/.3ML
0.3 INJECTION SUBCUTANEOUS EVERY 5 MIN PRN
Status: CANCELLED | OUTPATIENT
Start: 2017-10-06

## 2017-10-05 RX ORDER — EPINEPHRINE 1 MG/ML
0.3 INJECTION INTRAMUSCULAR; INTRAVENOUS; SUBCUTANEOUS EVERY 5 MIN PRN
Status: CANCELLED | OUTPATIENT
Start: 2017-10-06

## 2017-10-05 RX ORDER — FLUOROURACIL 50 MG/ML
400 INJECTION, SOLUTION INTRAVENOUS ONCE
Status: COMPLETED | OUTPATIENT
Start: 2017-10-05 | End: 2017-10-05

## 2017-10-05 RX ORDER — ALBUTEROL SULFATE 0.83 MG/ML
2.5 SOLUTION RESPIRATORY (INHALATION)
Status: CANCELLED | OUTPATIENT
Start: 2017-10-06

## 2017-10-05 RX ORDER — LORAZEPAM 2 MG/ML
0.5 INJECTION INTRAMUSCULAR EVERY 4 HOURS PRN
Status: CANCELLED
Start: 2017-10-06

## 2017-10-05 RX ORDER — ALBUTEROL SULFATE 90 UG/1
1-2 AEROSOL, METERED RESPIRATORY (INHALATION)
Status: CANCELLED
Start: 2017-10-06

## 2017-10-05 RX ORDER — DIPHENHYDRAMINE HYDROCHLORIDE 50 MG/ML
50 INJECTION INTRAMUSCULAR; INTRAVENOUS
Status: CANCELLED
Start: 2017-10-06

## 2017-10-05 RX ORDER — METHYLPREDNISOLONE SODIUM SUCCINATE 125 MG/2ML
125 INJECTION, POWDER, LYOPHILIZED, FOR SOLUTION INTRAMUSCULAR; INTRAVENOUS
Status: CANCELLED
Start: 2017-10-06

## 2017-10-05 RX ORDER — MAGNESIUM SULFATE HEPTAHYDRATE 40 MG/ML
2 INJECTION, SOLUTION INTRAVENOUS DAILY PRN
Status: DISCONTINUED | OUTPATIENT
Start: 2017-10-05 | End: 2017-10-05 | Stop reason: HOSPADM

## 2017-10-05 RX ORDER — MEPERIDINE HYDROCHLORIDE 25 MG/ML
25 INJECTION INTRAMUSCULAR; INTRAVENOUS; SUBCUTANEOUS EVERY 30 MIN PRN
Status: CANCELLED | OUTPATIENT
Start: 2017-10-06

## 2017-10-05 RX ADMIN — FLUOROURACIL 745 MG: 50 INJECTION, SOLUTION INTRAVENOUS at 15:35

## 2017-10-05 RX ADMIN — DEXTROSE 250 ML: 5 SOLUTION INTRAVENOUS at 13:01

## 2017-10-05 RX ADMIN — SODIUM CHLORIDE, PRESERVATIVE FREE 5 ML: 5 INJECTION INTRAVENOUS at 11:40

## 2017-10-05 RX ADMIN — OXALIPLATIN 158 MG: 5 INJECTION, SOLUTION, CONCENTRATE INTRAVENOUS at 13:30

## 2017-10-05 RX ADMIN — MAGNESIUM SULFATE HEPTAHYDRATE: 500 INJECTION, SOLUTION INTRAMUSCULAR; INTRAVENOUS at 13:36

## 2017-10-05 RX ADMIN — DEXAMETHASONE SODIUM PHOSPHATE: 10 INJECTION, SOLUTION INTRAMUSCULAR; INTRAVENOUS at 13:01

## 2017-10-05 RX ADMIN — LEUCOVORIN CALCIUM 650 MG: 500 INJECTION, POWDER, LYOPHILIZED, FOR SOLUTION INTRAMUSCULAR; INTRAVENOUS at 13:34

## 2017-10-05 ASSESSMENT — PAIN SCALES - GENERAL: PAINLEVEL: NO PAIN (0)

## 2017-10-05 NOTE — MR AVS SNAPSHOT
After Visit Summary   10/5/2017    Sebas Lopez    MRN: [id number]           Patient Information     Date Of Birth          1965        Visit Information        Provider Department      10/5/2017 12:00 PM Albert Long MD [facility]        Today's Diagnoses     Cancer of sigmoid colon (H)    -  1    Diarrhea, unspecified type        Peritoneal carcinomatosis (H)          Care Instructions    Proceed with chemo today    Give IV magnesium 2gm today    Next chemo during the week of 10/23 and see Linda then    Repeat CT Scan 11/6  And see me Nov 9 with labs and next chemo              Follow-ups after your visit        Your next 10 appointments already scheduled     Oct 05, 2017  1:00 PM CDT   Infusion 240 with [facility] ONCOLOGY INFUSION, [facility] 10 ATC   [facility] ([facility])    [address] [place] [id number] [phone]              Future tests that were ordered for you today     Open Future Orders        Priority Expected Expires Ordered    CT Chest abdomen pelvis w & w/o contrast Routine 11/6/2017 10/5/2018 10/5/2017            Who to contact     If you have questions or need follow up information about today's clinic visit or your schedule please contact [facility] directly at [phone].  Normal or non-critical lab and imaging results will be communicated to you by CrowdMedhart, letter or phone within 4 business days after the clinic has received the results. If you do not hear from us within 7 days, please contact the clinic through MyChart or phone. If you have a critical or abnormal lab result, we will notify you by phone as soon as possible.  Submit refill requests through [employer] or call your pharmacy and they will forward the refill request to us. Please allow 3 business days for your refill to be completed.          Additional Information About Your Visit        [facility]  Information     [employer] gives you secure access to your electronic health record. If you see a primary care provider, you can also send messages to your care team and make appointments. If you have questions, please call your primary care clinic.  If you do not have a primary care provider, please call [phone] and they will assist you.        Care EveryWhere ID     This is your Care EveryWhere ID. This could be used by other organizations to access your [facility] medical records  [id number]        Your Vitals Were     Pulse Temperature Respirations Pulse Oximetry BMI (Body Mass Index)       91 99  F (37.2  C) 16 98% 23.01 kg/m2        Blood Pressure from Last 3 Encounters:   10/05/17 126/83   09/29/17 125/86   09/22/17 117/78    Weight from Last 3 Encounters:   10/05/17 70.7 kg (155 lb 14.4 oz)   09/29/17 72.7 kg (160 lb 4.8 oz)   09/22/17 70.2 kg (154 lb 12.8 oz)                 Where to get your medicines      Some of these will need a paper prescription and others can be bought over the counter.  Ask your nurse if you have questions.     Bring a paper prescription for each of these medications     opium tincture tincture          Primary Care Provider Office Phone # Fax #    Jaguar Becker [phone]       [address] [address]        Equal Access to Services     SARAH DUNN : Hadii karrie spiveyo Soshu, waaxda luqadaha, qaybta kaalmabhargav thorpe . So [facility] [phone].    ATENCIÓN: Si habla español, tiene a cid disposición servicios gratuitos de asistencia lingüística. Llsher al [phone].    We comply with applicable federal civil rights laws and [place] laws. We do not discriminate on the basis of race, color, national origin, age, disability, sex, sexual orientation, or gender identity.            Thank you!     Thank you for choosing [facility]  for your care. Our goal is always to  provide you with excellent care. Hearing back from our patients is one way we can continue to improve our services. Please take a few minutes to complete the written survey that you may receive in the mail after your visit with us. Thank you!             Your Updated Medication List - Protect others around you: Learn how to safely use, store and throw away your medicines at www.disposemymeds.org.          This list is accurate as of: 10/5/17 12:37 PM.  Always use your most recent med list.                   Brand Name Dispense Instructions for use Diagnosis    amylase-lipase-protease 90019 UNITS Cpep    CREON    63 capsule    Take 2 capsules (72,000 Units) by mouth 3 times daily (with meals) And 1 capsule with each snack tid.    Diarrhea, unspecified type, Peritoneal carcinomatosis (H), Cancer of sigmoid colon (H)       cholestyramine 4 G Packet    QUESTRAN     Take 2 packets by mouth 3 times daily (with meals)        diphenoxylate-atropine 2.5-0.025 MG per tablet    LOMOTIL    40 tablet    Take 1-2 tablets by mouth 4 times daily as needed for diarrhea    Diarrhea due to drug       IRON SUPPLEMENT PO      Take 325 mg by mouth 2 times daily (with meals)        loperamide 2 MG capsule    IMODIUM     Take 4 mg by mouth 4 times daily as needed for diarrhea        LORazepam 0.5 MG tablet    ATIVAN    30 tablet    Take 1 tablet (0.5 mg) by mouth every 4 hours as needed (Anxiety, Nausea/Vomiting or Sleep)    Peritoneal carcinomatosis (H), Cancer of sigmoid colon (H)       OMEPRAZOLE PO      Take 20 mg by mouth 2 times daily (before meals)        ondansetron 8 MG tablet    ZOFRAN    90 tablet    Take 1 tablet (8 mg) by mouth every 8 hours    Peritoneal carcinomatosis (H), Cancer of sigmoid colon (H)       opium tincture tincture     72 mL    Take 0.6 mLs (6 mg) by mouth 4 times daily    Diarrhea, unspecified type, Cancer of sigmoid colon (H), Peritoneal carcinomatosis (H)       Potassium Chloride 40 MEQ/15ML (20%) Soln      450 mL    Take 15 mLs (40 mEq) by mouth daily    Hypokalemia       prochlorperazine 10 MG tablet    COMPAZINE    30 tablet    Take 1 tablet (10 mg) by mouth every 6 hours as needed (Nausea/Vomiting)    Peritoneal carcinomatosis (H), Cancer of sigmoid colon (H)       Psyllium 51.7 % Pack    METAMUCIL FIBER    90 each    Take 1 packet by mouth 3 times daily    Diarrhea, unspecified type

## 2017-10-05 NOTE — PROGRESS NOTES
Oncology follow up visit:  Date on this visit: 10/5/2017      CC  sigmoid adenocarcinoma with peritoneal carcinomatosis    Primary Physician: Waylon Han     History Of Present Illness:     Please see previous note for details. I have copied and updated from prior notes.   Sebas is a 52-year-old male who has a history of ulcerative colitis diagnosed more than 20 years ago, but he has not had medical management for that.  He was doing well, but in 05/2017 he presented with a few weeks of abdominal pain.  At that time, his imaging showed that he had a sigmoid wall thickening with adjacent mesenteric lymphadenopathy and free fluid near the abdominal wall mesh from his prior hernia surgery.  He subsequently underwent a colonoscopy which was incomplete and showed an obstructing sigmoid colon mass.  The biopsy from the sigmoid mass showed sigmoid adenocarcinoma.  He then developed obstructive symptoms and underwent exploratory laparotomy on 07/10/2017.  During that he was found to have diffuse peritoneal carcinomatosis.  Extensive lysis of adhesions was performed and a small bowel resection was performed with end ileostomy.  The biopsies from the multiple large peritoneal metastasis also were positive for metastatic adenocarcinoma. We still haven't received testing on MSI on NGS panel back     He started palliative FOLFOX on 8/11/17. C#4 given on 9/22/17    He also had iron deficiency anemia for which she received intravenous infed    Interval history  He comes in today and tells me that he is tolerating chemotherapy well. He denies any nausea and vomiting. He has issues with high ostomy output. He is putting out about 4 L a day. He thinks that after he started omeprazole it helped. He is also taking Creon. He has been using I Imodium. Previously he tried cholestyramine which did not help. He has not tried Lomotil apart from one day. He denies any pain. No bleeding. He's been able to take enough oral fluids to keep  himself well-hydrated. He's been able to eat and drink well. His weight for the most part now has a stabilized. Denies infections. He feels cold sensitivity for one week or so but denies any lingering neuropathy. No infections. No new swellings. Energy seems to be good.    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     Metastatic adenocarcinoma (H)      Ulcerative colitis (H)      Past Surgical History:   Procedure Laterality Date     COLONOSCOPY  2017     GI SURGERY  07/10/2017    Extensive lysis of adhesions, small bowel resection with end ileostomy.      HERNIA REPAIR       INSERT PORT VASCULAR ACCESS Right 8/10/2017    Procedure: INSERT PORT VASCULAR ACCESS;  Single Lumen Right Chest Power Port;  Surgeon: Malena Andrew PA-C;  [facility]     ORTHOPEDIC SURGERY Left     HIP ARTHROPLASTY      Ulcerative colitis.  Left hip replacement.       Cancer History:   As above    Allergies:  Allergies as of 10/05/2017     (No Known Allergies)     Current Medications:  Current Outpatient Prescriptions   Medication Sig Dispense Refill     opium tincture tincture Take 0.6 mLs (6 mg) by mouth 4 times daily 72 mL 0     OMEPRAZOLE PO Take 20 mg by mouth 2 times daily (before meals)       amylase-lipase-protease (CREON) 22205 UNITS CPEP Take 2 capsules (72,000 Units) by mouth 3 times daily (with meals) And 1 capsule with each snack tid. 63 capsule 3     Potassium Chloride 40 MEQ/15ML (20%) SOLN Take 15 mLs (40 mEq) by mouth daily 450 mL 3     Psyllium (METAMUCIL FIBER) 51.7 % PACK Take 1 packet by mouth 3 times daily 90 each 3     LORazepam (ATIVAN) 0.5 MG tablet Take 1 tablet (0.5 mg) by mouth every 4 hours as needed (Anxiety, Nausea/Vomiting  or Sleep) 30 tablet 2     Ferrous Sulfate (IRON SUPPLEMENT PO) Take 325 mg by mouth 2 times daily (with meals)        ondansetron (ZOFRAN) 8 MG tablet Take 1 tablet (8 mg) by mouth every 8 hours (Patient not taking: Reported on 9/22/2017) 90 tablet 2     diphenoxylate-atropine (LOMOTIL) 2.5-0.025 MG per tablet Take 1-2 tablets by mouth 4 times daily as needed for diarrhea (Patient not taking: Reported on 9/8/2017) 40 tablet 1     prochlorperazine (COMPAZINE) 10 MG tablet Take 1 tablet (10 mg) by mouth every 6 hours as needed (Nausea/Vomiting) (Patient not taking: Reported on 9/8/2017) 30 tablet 2     cholestyramine (QUESTRAN) 4 G Packet Take 2 packets by mouth 3 times daily (with meals)       loperamide (IMODIUM) 2 MG capsule Take 4 mg by mouth 4 times daily as needed for diarrhea         Family History:  No family history on file.   No family history of cancer.  He does not have any kids.       Social History:  Social History     Social History     Marital status: Single     Spouse name: N/A     Number of children: N/A     Years of education: N/A     Occupational History     Not on file.     Social History Main Topics     Smoking status: Never Smoker     Smokeless tobacco: Never Used     Alcohol use 3.0 oz/week     5 Cans of beer per week     Drug use: No     Sexual activity: Not on file     Other Topics Concern     Not on file     Social History Narrative   He does not smoke and does not drink.  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  He lives with his girlfriend, Bessie.       Physical Exam:  /83  Pulse 91  Temp 99  F (37.2  C)  Resp 16  Wt 70.7 kg (155 lb 14.4 oz)  SpO2 98%  BMI 23.01 kg/m2  CONSTITUTIONAL: No apparent distress  EYES: PERRLA, without pallor or jaundice  ENT/MOUTH: Ears unremarkable. No oral lesions  CVS: s1s2 normal  RESPIRATORY: Chest is clear  GI: Ostomy site looks clean. It has liquidy stool. No blood. Abdomen is soft and nontender. No organomegaly appreciated.  Surgical scars have healed well.  NEURO: He is alert and oriented ×3  INTEGUMENT: no concerning his skin rashes   LYMPHATIC: no palpable lymphadenopathy  MUSCULOSKELETAL: Unremarkable. No bony tenderness.   EXTREMITIES: no pedal edema  PSYCH: Mentation, mood and affect are appropriate          Laboratory/Imaging StudiesResults for KARINA MINER (MRN [id number]) as of 10/5/2017 12:33   Ref. Range 10/5/2017 11:41   Sodium Latest Ref Range: 133 - 144 mmol/L 134   Potassium Latest Ref Range: 3.4 - 5.3 mmol/L 4.8   Chloride Latest Ref Range: 94 - 109 mmol/L 103   Carbon Dioxide Latest Ref Range: 20 - 32 mmol/L 24   Urea Nitrogen Latest Ref Range: 7 - 30 mg/dL 12   Creatinine Latest Ref Range: 0.66 - 1.25 mg/dL 1.05   GFR Estimate Latest Ref Range: >60 mL/min/1.7m2 74   GFR Estimate If Black Latest Ref Range: >60 mL/min/1.7m2 89   Calcium Latest Ref Range: 8.5 - 10.1 mg/dL 9.1   Anion Gap Latest Ref Range: 3 - 14 mmol/L 7   Magnesium Latest Ref Range: 1.6 - 2.3 mg/dL 1.3 (L)   Albumin Latest Ref Range: 3.4 - 5.0 g/dL 2.8 (L)   Protein Total Latest Ref Range: 6.8 - 8.8 g/dL 7.9   Bilirubin Total Latest Ref Range: 0.2 - 1.3 mg/dL 0.6   Alkaline Phosphatase Latest Ref Range: 40 - 150 U/L 131   ALT Latest Ref Range: 0 - 70 U/L 47   AST Latest Ref Range: 0 - 45 U/L 52 (H)   Glucose Latest Ref Range: 70 - 99 mg/dL 90   WBC Latest Ref Range: 4.0 - 11.0 10e9/L 5.9   Hemoglobin Latest Ref Range: 13.3 - 17.7 g/dL 10.1 (L)   Hematocrit Latest Ref Range: 40.0 - 53.0 % 30.7 (L)   Platelet Count Latest Ref Range: 150 - 450 10e9/L 138 (L)   RBC Count Latest Ref Range: 4.4 - 5.9 10e12/L 3.72 (L)   MCV Latest Ref Range: 78 - 100 fl 83   MCH Latest Ref Range: 26.5 - 33.0 pg 27.2   MCHC Latest Ref Range: 31.5 - 36.5 g/dL 32.9   RDW Latest Ref Range: 10.0 - 15.0 % 26.5 (H)   Diff Method Unknown PENDING       ASSESSMENT/PLAN:    Karina has 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  The sigmoid carcinoma is obstructing,  requiring exploratory laparotomy and end ileostomy along with small bowel resection.    We have not received the results of MSI testing as well as NGS panel on the pathology specimen and we will request them again. It will not change my management in the near future but it would be a good information to have    He has been started on FOLFOX palliative chemotherapy. He has received 4 cycles up to now. He's been tolerating it well. My plan is to give him 6 cycles and then reimage him. If you're able to achieve good results with chemotherapy then potentially he can be reevaluated by Dr. Dudley for debulking surgery with HIPEC     We will proceed with cycle #5 today    He is going out on a hunting trip from October 13 in October 21 we will have to delay cycle #6 for the week off 10/23 . He will see Linda then    On November 6 should have a repeat CT scan and I will see him back on November 9    High ostomy output. In addition to Imodium, Creon, omeprazole I also told him to start taking Lomotil. I gave him prescription refills for tincture of opium.    Nutrition. This has improved. He has met with dietitian and he thinks it has helped.    I encouraged him to exercise on a regular basis.    Cold sensitivity. This is related to oxaliplatin. He does not have any lingering neuropathy. We will observe for now.    He has hypomagnesemia. I will give him 2 g of IV magnesium. I don't want to give him oral magnesium as it will make his diarrhea worse    Previously he has received IV iron. His hemoglobin is stable now. Plan to repeat his iron studies next month     I answered all of his questions to his satisfaction and he is agreeable and comfortable with the plan         Albert Long

## 2017-10-05 NOTE — PATIENT INSTRUCTIONS
Contact Numbers  [facility] Nurse Triage: [phone]  After Hours Nurse Line:  [phone]    Please call the [facility] Nurse Triage line or after hours number if you experience a temperature greater than or equal to 100.5, shaking chills, have uncontrolled nausea, vomiting and/or diarrhea, dizziness, shortness of breath, chest pain, bleeding, unexplained bruising, or if you have any other new/concerning symptoms, questions or concerns.     If you are having any concerning symptoms or wish to speak to a provider before your next infusion visit, please call your care coordinator or triage to notify them so we can adequately serve you.     If you need a refill on a narcotic prescription or other medication, please call triage before your infusion appointment.         October 2017 Sunday Monday Tuesday Wednesday Thursday Friday Saturday   1     2     3     4     5     [facility]   11:30 AM   (15 min.)    [facility] LAB DRAW   [facility] Lab Draw     [facility] RETURN   11:45 AM   (30 min.)   Albert Long MD   [facility] ONC INFUSION 240    1:00 PM   (240 min.)    ONCOLOGY INFUSION   [facility] 6     7       8     9     10     11     12     13     14       15     16     17     18     19     20     21       22     23     24     25     26     [facility]   10:30 AM   (15 min.)    [facility] LAB DRAW   [facility] Lab Draw     [facility] RETURN   10:55 AM   (50 min.)   Linda Alves PA-C   [facility] ONC INFUSION 240   12:30 PM   (240 min.)    ONCOLOGY INFUSION   [facility] 27     28       29     30     31 November 2017 Sunday Monday Tuesday Wednesday Thursday Friday Saturday                  1     2     3     4       5     6     [facility]   11:00 AM   (15 min.)    [facility] LAB DRAW   [facility] Lab Draw     CT CHEST ABDOMEN PELVIS  WWO   11:25 AM   (20 min.)   UCCT2   [facility] CT 7     8     9     [email or web address] LAB DRAW   12:00 PM   (15 min.)   [facility] DRAW   [facility] Lab Draw     [email or web address] RETURN   12:15 PM   (30 min.)   Albert Long MD   [facility] ONC INFUSION 240    1:00 PM   (240 min.)    ONCOLOGY INFUSION   [facility] 10     11       12     13     14     15     16     17     18       19     20     21     22     23     24     25       26     27     28     29     30                            Lab Results:  Recent Results (from the past 12 hour(s))   Magnesium    Collection Time: 10/05/17 11:41 AM   Result Value Ref Range    Magnesium 1.3 (L) 1.6 - 2.3 mg/dL   CBC with platelets differential    Collection Time: 10/05/17 11:41 AM   Result Value Ref Range    WBC 5.9 4.0 - 11.0 10e9/L    RBC Count 3.72 (L) 4.4 - 5.9 10e12/L    Hemoglobin 10.1 (L) 13.3 - 17.7 g/dL    Hematocrit 30.7 (L) 40.0 - 53.0 %    MCV 83 78 - 100 fl    MCH 27.2 26.5 - 33.0 pg    MCHC 32.9 31.5 - 36.5 g/dL    RDW 26.5 (H) 10.0 - 15.0 %    Platelet Count 138 (L) 150 - 450 10e9/L    Diff Method Automated Method     % Neutrophils 50.5 %    % Lymphocytes 30.2 %    % Monocytes 13.8 %    % Eosinophils 4.6 %    % Basophils 0.7 %    % Immature Granulocytes 0.2 %    Nucleated RBCs 0 0 /100    Absolute Neutrophil 3.0 1.6 - 8.3 10e9/L    Absolute Lymphocytes 1.8 0.8 - 5.3 10e9/L    Absolute Monocytes 0.8 0.0 - 1.3 10e9/L    Absolute Eosinophils 0.3 0.0 - 0.7 10e9/L    Absolute Basophils 0.0 0.0 - 0.2 10e9/L    Abs Immature Granulocytes 0.0 0 - 0.4 10e9/L    Absolute Nucleated RBC 0.0    Comprehensive metabolic panel    Collection Time: 10/05/17 11:41 AM   Result Value Ref Range    Sodium 134 133 - 144 mmol/L    Potassium 4.8 3.4 - 5.3 mmol/L    Chloride 103 94 - 109 mmol/L    Carbon Dioxide 24 20 - 32 mmol/L    Anion Gap 7 3 - 14 mmol/L    Glucose 90 70 - 99 mg/dL    Urea Nitrogen 12 7 - 30 mg/dL    Creatinine  1.05 0.66 - 1.25 mg/dL    GFR Estimate 74 >60 mL/min/1.7m2    GFR Estimate If Black 89 >60 mL/min/1.7m2    Calcium 9.1 8.5 - 10.1 mg/dL    Bilirubin Total 0.6 0.2 - 1.3 mg/dL    Albumin 2.8 (L) 3.4 - 5.0 g/dL    Protein Total 7.9 6.8 - 8.8 g/dL    Alkaline Phosphatase 131 40 - 150 U/L    ALT 47 0 - 70 U/L    AST 52 (H) 0 - 45 U/L

## 2017-10-05 NOTE — PATIENT INSTRUCTIONS
Proceed with chemo today    Give IV magnesium 2gm today    Next chemo during the week of 10/23 and see Linda then    Repeat CT Scan 11/6  And see me Nov 9 with labs and next chemo

## 2017-10-05 NOTE — PROGRESS NOTES
Infusion Nursing Note:  Sebas Lopez presents today for Cycle 5 Day 1 Oxaliplatin, Leucovorin, Fluorouracil bolus and pump connect.    Patient seen by provider today: Yes: Dr. Long    Note: Magnesium replaced per protocol IV today.    Intravenous Access:  Implanted Port.    Treatment Conditions:  Lab Results   Component Value Date    HGB 10.1 10/05/2017     Lab Results   Component Value Date    WBC 5.9 10/05/2017      Lab Results   Component Value Date    ANEU 3.0 10/05/2017     Lab Results   Component Value Date     10/05/2017      Lab Results   Component Value Date     10/05/2017                   Lab Results   Component Value Date    POTASSIUM 4.8 10/05/2017           Lab Results   Component Value Date    MAG 1.3 10/05/2017            Lab Results   Component Value Date    CR 1.05 10/05/2017                   Lab Results   Component Value Date    ANNAMARIE 9.1 10/05/2017                Lab Results   Component Value Date    BILITOTAL 0.6 10/05/2017           Lab Results   Component Value Date    ALBUMIN 2.8 10/05/2017                    Lab Results   Component Value Date    ALT 47 10/05/2017           Lab Results   Component Value Date    AST 52 10/05/2017     Results reviewed, labs MET treatment parameters, ok to proceed with treatment.      Post Infusion Assessment:  Patient tolerated infusion without incident.  Blood return noted pre and post infusion and prior to pump connect.  Site patent and intact, free from redness, edema or discomfort.  Fluorouracil pump connected at 1545 set to infuse over the next 46 hours.  [facility] aware to d/c pump at pt's home on 10/7 at 1300.  All connections taped, open and double checked with OLGA LIDIA Ricardo.    Discharge Plan:   Prescription refills given for Opium Tincture, Ativan and Potassium.  Copy of AVS reviewed with patient and/or family.  Patient will return 10/26 for next appointment.  Patient discharged in stable condition accompanied by: mother.  Departure Mode:  Ambulatory.    Zeenat Vides RN

## 2017-10-05 NOTE — MR AVS SNAPSHOT
After Visit Summary   10/5/2017    Sebas Lopez    MRN: [id number]           Patient Information     Date Of Birth          1965        Visit Information        Provider Department      10/5/2017 1:00 PM  10 ATC;  ONCOLOGY INFUSION [facility]        Today's Diagnoses     Peritoneal carcinomatosis (H)    -  1    Cancer of sigmoid colon (H)          Care Instructions    Contact Numbers  [facility] Nurse Triage: [phone]  After Hours Nurse Line:  [phone]    Please call the [facility] Nurse Triage line or after hours number if you experience a temperature greater than or equal to 100.5, shaking chills, have uncontrolled nausea, vomiting and/or diarrhea, dizziness, shortness of breath, chest pain, bleeding, unexplained bruising, or if you have any other new/concerning symptoms, questions or concerns.     If you are having any concerning symptoms or wish to speak to a provider before your next infusion visit, please call your care coordinator or triage to notify them so we can adequately serve you.     If you need a refill on a narcotic prescription or other medication, please call triage before your infusion appointment.         October 2017 Sunday Monday Tuesday Wednesday Thursday Friday Saturday   1     2     3     4     5     [facility] LAB DRAW   11:30 AM   (15 min.)   [facility]   [facility] Lab Draw     [facility] RETURN   11:45 AM   (30 min.)   Albert Long MD   [facility]    1:00 PM   (240 min.)    ONCOLOGY INFUSION   [facility] 6     7       8     9     10     11     12     13     14       15     16     17     18     19     20     21       22     23     24     25     26     [facility] LAB DRAW   10:30 AM   (15 min.)    [facility] LAB DRAW   [facility] Lab Draw     [facility] RETURN   10:55 AM   (50 min.)   Linda Alves PA-C   [facility]      [facility] ONC INFUSION 240   12:30 PM   (240 min.)   UC ONCOLOGY INFUSION   [facility] 27     28       29     30     31 November 2017 Sunday Monday Tuesday Wednesday Thursday Friday Saturday                  1     2     3     4       5     6     [facility]   11:00 AM   (15 min.)    [facility]   [facility]     CT CHEST ABDOMEN PELVIS WWO   11:25 AM   (20 min.)   UCCT2   [facility] CT 7     8     9     [facility] DRAW   12:00 PM   (15 min.)    [facility] LAB DRAW   [facility] Lab Draw     [facility] RETURN   12:15 PM   (30 min.)   [facility] MD   [facility]    1:00 PM   (240 min.)    ONCOLOGY INFUSION   [facility] 10     11       12     13     14     15     16     17     18       19     20     21     22     23     24     25       26     27     28     29     30                            Lab Results:  Recent Results (from the past 12 hour(s))   Magnesium    Collection Time: 10/05/17 11:41 AM   Result Value Ref Range    Magnesium 1.3 (L) 1.6 - 2.3 mg/dL   CBC with platelets differential    Collection Time: 10/05/17 11:41 AM   Result Value Ref Range    WBC 5.9 4.0 - 11.0 10e9/L    RBC Count 3.72 (L) 4.4 - 5.9 10e12/L    Hemoglobin 10.1 (L) 13.3 - 17.7 g/dL    Hematocrit 30.7 (L) 40.0 - 53.0 %    MCV 83 78 - 100 fl    MCH 27.2 26.5 - 33.0 pg    MCHC 32.9 31.5 - 36.5 g/dL    RDW 26.5 (H) 10.0 - 15.0 %    Platelet Count 138 (L) 150 - 450 10e9/L    Diff Method Automated Method     % Neutrophils 50.5 %    % Lymphocytes 30.2 %    % Monocytes 13.8 %    % Eosinophils 4.6 %    % Basophils 0.7 %    % Immature Granulocytes 0.2 %    Nucleated RBCs 0 0 /100    Absolute Neutrophil 3.0 1.6 - 8.3 10e9/L    Absolute Lymphocytes 1.8 0.8 - 5.3 10e9/L    Absolute Monocytes 0.8 0.0 - 1.3 10e9/L    Absolute Eosinophils 0.3 0.0 - 0.7 10e9/L    Absolute Basophils 0.0 0.0 - 0.2  10e9/L    Abs Immature Granulocytes 0.0 0 - 0.4 10e9/L    Absolute Nucleated RBC 0.0    Comprehensive metabolic panel    Collection Time: 10/05/17 11:41 AM   Result Value Ref Range    Sodium 134 133 - 144 mmol/L    Potassium 4.8 3.4 - 5.3 mmol/L    Chloride 103 94 - 109 mmol/L    Carbon Dioxide 24 20 - 32 mmol/L    Anion Gap 7 3 - 14 mmol/L    Glucose 90 70 - 99 mg/dL    Urea Nitrogen 12 7 - 30 mg/dL    Creatinine 1.05 0.66 - 1.25 mg/dL    GFR Estimate 74 >60 mL/min/1.7m2    GFR Estimate If Black 89 >60 mL/min/1.7m2    Calcium 9.1 8.5 - 10.1 mg/dL    Bilirubin Total 0.6 0.2 - 1.3 mg/dL    Albumin 2.8 (L) 3.4 - 5.0 g/dL    Protein Total 7.9 6.8 - 8.8 g/dL    Alkaline Phosphatase 131 40 - 150 U/L    ALT 47 0 - 70 U/L    AST 52 (H) 0 - 45 U/L               Follow-ups after your visit        Your next 10 appointments already scheduled     Oct 26, 2017 10:30 AM CDT   [facility] Draw with [facility] DRAW   [facility] ([facility])    [address] [place] [id number]   [phone]            Oct 26, 2017 11:10 AM CDT   (Arrive by 10:55 AM)   Return Visit with Linda Alves PA-C   [facility])    [address] [place] [address]   [phone]            Oct 26, 2017 12:30 PM CDT   Infusion 240 with UC ONCOLOGY INFUSION, UC 21 ATC   [facility] ([facility])    [address] [place] [address]   [phone]            Nov 06, 2017 11:00 AM CST   [facility] Lab Draw with  MASONIC LAB DRAW   [facility] Masonic Lab Draw ([facility])    [address] [place] [address]   [phone]            Nov 06, 2017 11:40 AM CST   (Arrive by 11:25 AM)   CT CHEST ABDOMEN PELVIS W/O & W CONTRAST with UCCT2   [facility] CT ([facility])    [address]  [address] [facility] [address] [phone]           Please bring any scans or X-rays taken at other hospitals, if similar tests were done. Also bring a list of your medicines, including vitamins, minerals and over-the-counter drugs. It is safest to leave personal items at home.  Be sure to tell your doctor:   If you have any allergies.   If there s any chance you are pregnant.   If you are breastfeeding.   If you have any special needs.  You may have contrast for this exam. To prepare:   Do not eat or drink for 2 hours before your exam. If you need to take medicine, you may take it with small sips of water. (We may ask you to take liquid medicine as well.)   The day before your exam, drink extra fluids at least six 8-ounce glasses (unless your doctor tells you to restrict your fluids).  Patients over 70 or patients with diabetes or kidney problems:   If you haven t had a blood test (creatinine test) within the last 30 days, go to your clinic or Diagnostic Imaging Department for this test.  If you have diabetes:   If your kidney function is normal, continue taking your metformin (Avandamet, Glucophage, Glucovance, Metaglip) on the day of your exam.   If your kidney function is abnormal, wait 48 hours before restarting this medicine.  You will have oral contrast for this exam:   You will drink the contrast at home. Get this from your clinic or Diagnostic Imaging Department. Please follow the directions given.  Please wear loose clothing, such as a sweat suit or jogging clothes. Avoid snaps, zippers and other metal. We may ask you to undress and put on a hospital gown.  If you have any questions, please call the Imaging Department where you will have your exam.            Nov 09, 2017 12:00 PM [facility]   [facility] Lab Draw with  MASONIC LAB DRAW   [facility] Masonic Lab Draw ([facility])    [address] [address]    [phone]            Nov 09, 2017 12:30 PM CST   (Arrive by 12:15 PM)   Return Visit with Albert Long MD   [facility] ([facility])    [address] [place] [address] [phone]            Nov 09, 2017  1:00 PM CST   Infusion 240 with UC ONCOLOGY INFUSION, UC 10 ATC   [facility] ([facility])    [address] [place] [address] [phone]              Future tests that were ordered for you today     Open Future Orders        Priority Expected Expires Ordered    CT Chest abdomen pelvis w & w/o contrast Routine 11/6/2017 10/5/2018 10/5/2017            Who to contact     If you have questions or need follow up information about today's clinic visit or your schedule please contact [facility] directly at [phone].  Normal or non-critical lab and imaging results will be communicated to you by Swayhart, letter or phone within 4 business days after the clinic has received the results. If you do not hear from us within 7 days, please contact the clinic through Dhf Taxit or phone. If you have a critical or abnormal lab result, we will notify you by phone as soon as possible.  Submit refill requests through Simmery or call your pharmacy and they will forward the refill request to us. Please allow 3 business days for your refill to be completed.          Additional Information About Your Visit        SwayharNearpod Information     Simmery gives you secure access to your electronic health record. If you see a primary care provider, you can also send messages to your care team and make appointments. If you have questions, please call your primary care clinic.  If you do not have a primary care provider, please call [phone] and they will assist you.        Care EveryWhere ID     This is your Care EveryWhere ID. This could be used by other organizations to  access your [place] medical records  [id number]         Blood Pressure from Last 3 Encounters:   10/05/17 126/83   09/29/17 125/86   09/22/17 117/78    Weight from Last 3 Encounters:   10/05/17 70.7 kg (155 lb 14.4 oz)   09/29/17 72.7 kg (160 lb 4.8 oz)   09/22/17 70.2 kg (154 lb 12.8 oz)              We Performed the Following     CBC with platelets differential     Comprehensive metabolic panel     Magnesium          Where to get your medicines      Some of these will need a paper prescription and others can be bought over the counter.  Ask your nurse if you have questions.     Bring a paper prescription for each of these medications     opium tincture tincture          Primary Care Provider Office Phone # Fax #    Jaguar Becker [phone]       [place] [facility] STAG[facility]INE [facility] [address]        Equal Access to Services     [facility] CampusMARINO : Hadii aad ku hadasho Soshu, waaxda luqadaha, qaybta kaalmada adeegyada, bhargav sage . So [facility] [phone].    ATENCIÓN: Si habla español, tiene a cid disposición servicios gratuitos de asistencia lingüística. Llame al [phone].    We comply with applicable federal civil rights laws and [place] laws. We do not discriminate on the basis of race, color, national origin, age, disability, sex, sexual orientation, or gender identity.            Thank you!     Thank you for choosing [facility]  for your care. Our goal is always to provide you with excellent care. Hearing back from our patients is one way we can continue to improve our services. Please take a few minutes to complete the written survey that you may receive in the mail after your visit with us. Thank you!             Your Updated Medication List - Protect others around you: Learn how to safely use, store and throw away your medicines at www.disposemymeds.org.          This list is accurate as of: 10/5/17  1:11 PM.  Always use your most  recent med list.                   Brand Name Dispense Instructions for use Diagnosis    amylase-lipase-protease 69440 UNITS Cpep    CREON    63 capsule    Take 2 capsules (72,000 Units) by mouth 3 times daily (with meals) And 1 capsule with each snack tid.    Diarrhea, unspecified type, Peritoneal carcinomatosis (H), Cancer of sigmoid colon (H)       cholestyramine 4 G Packet    QUESTRAN     Take 2 packets by mouth 3 times daily (with meals)        diphenoxylate-atropine 2.5-0.025 MG per tablet    LOMOTIL    40 tablet    Take 1-2 tablets by mouth 4 times daily as needed for diarrhea    Diarrhea due to drug       IRON SUPPLEMENT PO      Take 325 mg by mouth 2 times daily (with meals)        loperamide 2 MG capsule    IMODIUM     Take 4 mg by mouth 4 times daily as needed for diarrhea        LORazepam 0.5 MG tablet    ATIVAN    30 tablet    Take 1 tablet (0.5 mg) by mouth every 4 hours as needed (Anxiety, Nausea/Vomiting or Sleep)    Peritoneal carcinomatosis (H), Cancer of sigmoid colon (H)       OMEPRAZOLE PO      Take 20 mg by mouth 2 times daily (before meals)        ondansetron 8 MG tablet    ZOFRAN    90 tablet    Take 1 tablet (8 mg) by mouth every 8 hours    Peritoneal carcinomatosis (H), Cancer of sigmoid colon (H)       opium tincture tincture     72 mL    Take 0.6 mLs (6 mg) by mouth 4 times daily    Diarrhea, unspecified type, Cancer of sigmoid colon (H), Peritoneal carcinomatosis (H)       Potassium Chloride 40 MEQ/15ML (20%) Soln     450 mL    Take 15 mLs (40 mEq) by mouth daily    Hypokalemia       prochlorperazine 10 MG tablet    COMPAZINE    30 tablet    Take 1 tablet (10 mg) by mouth every 6 hours as needed (Nausea/Vomiting)    Peritoneal carcinomatosis (H), Cancer of sigmoid colon (H)       Psyllium 51.7 % Pack    METAMUCIL FIBER    90 each    Take 1 packet by mouth 3 times daily    Diarrhea, unspecified type

## 2017-10-05 NOTE — LETTER
10/5/2017       RE: Sebas Lopez  [address] [place] [address]     Dear Colleague,    Thank you for referring your patient, Sebas Lopez, to the [facility]. Please see a copy of my visit note below.    Oncology follow up visit:  Date on this visit: 10/5/2017      CC  sigmoid adenocarcinoma with peritoneal carcinomatosis    Primary Physician: Waylon Han     History Of Present Illness:     Please see previous note for details. I have copied and updated from prior notes.   Sebas is a 52-year-old male who has a history of ulcerative colitis diagnosed more than 20 years ago, but he has not had medical management for that.  He was doing well, but in 05/2017 he presented with a few weeks of abdominal pain.  At that time, his imaging showed that he had a sigmoid wall thickening with adjacent mesenteric lymphadenopathy and free fluid near the abdominal wall mesh from his prior hernia surgery.  He subsequently underwent a colonoscopy which was incomplete and showed an obstructing sigmoid colon mass.  The biopsy from the sigmoid mass showed sigmoid adenocarcinoma.  He then developed obstructive symptoms and underwent exploratory laparotomy on 07/10/2017.  During that he was found to have diffuse peritoneal carcinomatosis.  Extensive lysis of adhesions was performed and a small bowel resection was performed with end ileostomy.  The biopsies from the multiple large peritoneal metastasis also were positive for metastatic adenocarcinoma. We still haven't received testing on MSI on NGS panel back     He started palliative FOLFOX on 8/11/17. C#4 given on 9/22/17    He also had iron deficiency anemia for which she received intravenous infed    Interval history  He comes in today and tells me that he is tolerating chemotherapy well. He denies any nausea and vomiting. He has issues with high ostomy output. He is putting out about 4 L a day. He thinks that after he started omeprazole it  helped. He is also taking Creon. He has been using I Imodium. Previously he tried cholestyramine which did not help. He has not tried Lomotil apart from one day. He denies any pain. No bleeding. He's been able to take enough oral fluids to keep himself well-hydrated. He's been able to eat and drink well. His weight for the most part now has a stabilized. Denies infections. He feels cold sensitivity for one week or so but denies any lingering neuropathy. No infections. No new swellings. Energy seems to be good.    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     Metastatic adenocarcinoma (H)      Ulcerative colitis (H)      Past Surgical History:   Procedure Laterality Date     COLONOSCOPY  2017     GI SURGERY  07/10/2017    Extensive lysis of adhesions, small bowel resection with end ileostomy.      HERNIA REPAIR       INSERT PORT VASCULAR ACCESS Right 8/10/2017    Procedure: INSERT PORT VASCULAR ACCESS;  Single Lumen Right Chest Power Port;  Surgeon: Malena Andrew PA-C;  [facility]     ORTHOPEDIC SURGERY Left     HIP ARTHROPLASTY      Ulcerative colitis.  Left hip replacement.       Cancer History:   As above    Allergies:  Allergies as of 10/05/2017     (No Known Allergies)     Current Medications:  Current Outpatient Prescriptions   Medication Sig Dispense Refill     opium tincture tincture Take 0.6 mLs (6 mg) by mouth 4 times daily 72 mL 0     OMEPRAZOLE PO Take 20 mg by mouth 2 times daily (before meals)       amylase-lipase-protease (CREON) 22841 UNITS CPEP Take 2 capsules (72,000 Units) by mouth 3 times daily (with meals) And 1 capsule with each snack tid. 63 capsule 3     Potassium Chloride 40 MEQ/15ML (20%) SOLN Take 15  mLs (40 mEq) by mouth daily 450 mL 3     Psyllium (METAMUCIL FIBER) 51.7 % PACK Take 1 packet by mouth 3 times daily 90 each 3     LORazepam (ATIVAN) 0.5 MG tablet Take 1 tablet (0.5 mg) by mouth every 4 hours as needed (Anxiety, Nausea/Vomiting or Sleep) 30 tablet 2     Ferrous Sulfate (IRON SUPPLEMENT PO) Take 325 mg by mouth 2 times daily (with meals)        ondansetron (ZOFRAN) 8 MG tablet Take 1 tablet (8 mg) by mouth every 8 hours (Patient not taking: Reported on 9/22/2017) 90 tablet 2     diphenoxylate-atropine (LOMOTIL) 2.5-0.025 MG per tablet Take 1-2 tablets by mouth 4 times daily as needed for diarrhea (Patient not taking: Reported on 9/8/2017) 40 tablet 1     prochlorperazine (COMPAZINE) 10 MG tablet Take 1 tablet (10 mg) by mouth every 6 hours as needed (Nausea/Vomiting) (Patient not taking: Reported on 9/8/2017) 30 tablet 2     cholestyramine (QUESTRAN) 4 G Packet Take 2 packets by mouth 3 times daily (with meals)       loperamide (IMODIUM) 2 MG capsule Take 4 mg by mouth 4 times daily as needed for diarrhea         Family History:  No family history on file.   No family history of cancer.  He does not have any kids.       Social History:  Social History     Social History     Marital status: Single     Spouse name: N/A     Number of children: N/A     Years of education: N/A     Occupational History     Not on file.     Social History Main Topics     Smoking status: Never Smoker     Smokeless tobacco: Never Used     Alcohol use 3.0 oz/week     5 Cans of beer per week     Drug use: No     Sexual activity: Not on file     Other Topics Concern     Not on file     Social History Narrative   He does not smoke and does not drink.  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  He lives with his girlfriend, Bessie.       Physical Exam:  /83  Pulse 91  Temp 99  F (37.2  C)  Resp 16  Wt 70.7 kg (155 lb 14.4 oz)  SpO2 98%  BMI 23.01 kg/m2  CONSTITUTIONAL: No apparent distress  EYES: PERRLA,  without pallor or jaundice  ENT/MOUTH: Ears unremarkable. No oral lesions  CVS: s1s2 normal  RESPIRATORY: Chest is clear  GI: Ostomy site looks clean. It has liquidy stool. No blood. Abdomen is soft and nontender. No organomegaly appreciated. Surgical scars have healed well.  NEURO: He is alert and oriented ×3  INTEGUMENT: no concerning his skin rashes   LYMPHATIC: no palpable lymphadenopathy  MUSCULOSKELETAL: Unremarkable. No bony tenderness.   EXTREMITIES: no pedal edema  PSYCH: Mentation, mood and affect are appropriate          Laboratory/Imaging StudiesResults for KARINA MINER (MRN [id number]) as of 10/5/2017 12:33   Ref. Range 10/5/2017 11:41   Sodium Latest Ref Range: 133 - 144 mmol/L 134   Potassium Latest Ref Range: 3.4 - 5.3 mmol/L 4.8   Chloride Latest Ref Range: 94 - 109 mmol/L 103   Carbon Dioxide Latest Ref Range: 20 - 32 mmol/L 24   Urea Nitrogen Latest Ref Range: 7 - 30 mg/dL 12   Creatinine Latest Ref Range: 0.66 - 1.25 mg/dL 1.05   GFR Estimate Latest Ref Range: >60 mL/min/1.7m2 74   GFR Estimate If Black Latest Ref Range: >60 mL/min/1.7m2 89   Calcium Latest Ref Range: 8.5 - 10.1 mg/dL 9.1   Anion Gap Latest Ref Range: 3 - 14 mmol/L 7   Magnesium Latest Ref Range: 1.6 - 2.3 mg/dL 1.3 (L)   Albumin Latest Ref Range: 3.4 - 5.0 g/dL 2.8 (L)   Protein Total Latest Ref Range: 6.8 - 8.8 g/dL 7.9   Bilirubin Total Latest Ref Range: 0.2 - 1.3 mg/dL 0.6   Alkaline Phosphatase Latest Ref Range: 40 - 150 U/L 131   ALT Latest Ref Range: 0 - 70 U/L 47   AST Latest Ref Range: 0 - 45 U/L 52 (H)   Glucose Latest Ref Range: 70 - 99 mg/dL 90   WBC Latest Ref Range: 4.0 - 11.0 10e9/L 5.9   Hemoglobin Latest Ref Range: 13.3 - 17.7 g/dL 10.1 (L)   Hematocrit Latest Ref Range: 40.0 - 53.0 % 30.7 (L)   Platelet Count Latest Ref Range: 150 - 450 10e9/L 138 (L)   RBC Count Latest Ref Range: 4.4 - 5.9 10e12/L 3.72 (L)   MCV Latest Ref Range: 78 - 100 fl 83   MCH Latest Ref Range: 26.5 - 33.0 pg 27.2   MCHC Latest  Ref Range: 31.5 - 36.5 g/dL 32.9   RDW Latest Ref Range: 10.0 - 15.0 % 26.5 (H)   Diff Method Unknown PENDING       ASSESSMENT/PLAN:    Sebas has 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  The sigmoid carcinoma is obstructing, requiring exploratory laparotomy and end ileostomy along with small bowel resection.    We have not received the results of MSI testing as well as NGS panel on the pathology specimen and we will request them again. It will not change my management in the near future but it would be a good information to have    He has been started on FOLFOX palliative chemotherapy. He has received 4 cycles up to now. He's been tolerating it well. My plan is to give him 6 cycles and then reimage him. If you're able to achieve good results with chemotherapy then potentially he can be reevaluated by Dr. Dudley for debulking surgery with HIPEC     We will proceed with cycle #5 today    He is going out on a hunting trip from October 13 in October 21 we will have to delay cycle #6 for the week off 10/23 . He will see Linda then    On November 6 should have a repeat CT scan and I will see him back on November 9    High ostomy output. In addition to Imodium, Creon, omeprazole I also told him to start taking Lomotil. I gave him prescription refills for tincture of opium.    Nutrition. This has improved. He has met with dietitian and he thinks it has helped.    I encouraged him to exercise on a regular basis.    Cold sensitivity. This is related to oxaliplatin. He does not have any lingering neuropathy. We will observe for now.    He has hypomagnesemia. I will give him 2 g of IV magnesium. I don't want to give him oral magnesium as it will make his diarrhea worse    Previously he has received IV iron. His hemoglobin is stable now. Plan to repeat his iron studies next month     I answered all of his questions to his satisfaction and he is agreeable and comfortable with the plan         Albert DUMONT  Ethan

## 2017-10-05 NOTE — NURSING NOTE
"Oncology Rooming Note    October 5, 2017 12:02 PM   Sebas Lopez is a 52 year old male who presents for:    Chief Complaint   Patient presents with     Port Draw     accessed with power needle for labs, heparin locked, vitals checked     Oncology Clinic Visit     Colon CA- Medication refills- Labs done     Initial Vitals: /83  Pulse 91  Temp 99  F (37.2  C)  Resp 16  Wt 70.7 kg (155 lb 14.4 oz)  SpO2 98%  BMI 23.01 kg/m2 Estimated body mass index is 23.01 kg/(m^2) as calculated from the following:    Height as of 9/22/17: 1.753 m (5' 9.02\").    Weight as of this encounter: 70.7 kg (155 lb 14.4 oz). Body surface area is 1.86 meters squared.  No Pain (0) Comment: Data Unavailable   No LMP for male patient.  Allergies reviewed: Yes  Medications reviewed: Yes    Medications: MEDICATION REFILLS NEEDED TODAY. Provider was notified.  Pharmacy name entered into [facility]: [facility] - [place] - [place], [place] - [address] [place] [address]    Clinical concerns: Patient would like refilled on multiple medications due to being out of town from -10/13-10-21-17. Patient declined flu injections.     10 minutes for nursing intake (face to face time)     Kelly Castro LPN        -  "

## 2017-10-20 ENCOUNTER — MYC MEDICAL ADVICE (OUTPATIENT)
Dept: ONCOLOGY | Facility: CLINIC | Age: 52
End: 2017-10-20

## 2017-10-20 DIAGNOSIS — K52.1 DIARRHEA DUE TO DRUG: ICD-10-CM

## 2017-10-20 RX ORDER — DIPHENOXYLATE HCL/ATROPINE 2.5-.025MG
1-2 TABLET ORAL 4 TIMES DAILY PRN
Qty: 40 TABLET | Refills: 1
Start: 2017-10-20 | End: 2017-10-26

## 2017-10-26 ENCOUNTER — INFUSION THERAPY VISIT (OUTPATIENT)
Dept: ONCOLOGY | Facility: CLINIC | Age: 52
End: 2017-10-26
Attending: INTERNAL MEDICINE
Payer: COMMERCIAL

## 2017-10-26 VITALS
BODY MASS INDEX: 22.98 KG/M2 | OXYGEN SATURATION: 99 % | DIASTOLIC BLOOD PRESSURE: 78 MMHG | SYSTOLIC BLOOD PRESSURE: 116 MMHG | TEMPERATURE: 98.1 F | RESPIRATION RATE: 18 BRPM | HEART RATE: 90 BPM | WEIGHT: 155.7 LBS

## 2017-10-26 VITALS
HEART RATE: 90 BPM | WEIGHT: 155.65 LBS | DIASTOLIC BLOOD PRESSURE: 78 MMHG | SYSTOLIC BLOOD PRESSURE: 116 MMHG | OXYGEN SATURATION: 99 % | HEIGHT: 69 IN | TEMPERATURE: 98.1 F | BODY MASS INDEX: 23.05 KG/M2 | RESPIRATION RATE: 16 BRPM

## 2017-10-26 DIAGNOSIS — C18.7 CANCER OF SIGMOID COLON (H): Primary | ICD-10-CM

## 2017-10-26 DIAGNOSIS — E83.42 HYPOMAGNESEMIA: ICD-10-CM

## 2017-10-26 DIAGNOSIS — K52.1 DIARRHEA DUE TO DRUG: ICD-10-CM

## 2017-10-26 DIAGNOSIS — R19.7 DIARRHEA, UNSPECIFIED TYPE: ICD-10-CM

## 2017-10-26 DIAGNOSIS — D50.0 IRON DEFICIENCY ANEMIA DUE TO CHRONIC BLOOD LOSS: ICD-10-CM

## 2017-10-26 DIAGNOSIS — C78.6 PERITONEAL CARCINOMATOSIS (H): ICD-10-CM

## 2017-10-26 LAB
ALBUMIN SERPL-MCNC: 2.7 G/DL (ref 3.4–5)
ALP SERPL-CCNC: 137 U/L (ref 40–150)
ALT SERPL W P-5'-P-CCNC: 28 U/L (ref 0–70)
ANION GAP SERPL CALCULATED.3IONS-SCNC: 6 MMOL/L (ref 3–14)
AST SERPL W P-5'-P-CCNC: 34 U/L (ref 0–45)
BASOPHILS # BLD AUTO: 0.1 10E9/L (ref 0–0.2)
BASOPHILS NFR BLD AUTO: 0.8 %
BILIRUB SERPL-MCNC: 0.3 MG/DL (ref 0.2–1.3)
BUN SERPL-MCNC: 11 MG/DL (ref 7–30)
CALCIUM SERPL-MCNC: 8.7 MG/DL (ref 8.5–10.1)
CHLORIDE SERPL-SCNC: 103 MMOL/L (ref 94–109)
CO2 SERPL-SCNC: 26 MMOL/L (ref 20–32)
CREAT SERPL-MCNC: 1.04 MG/DL (ref 0.66–1.25)
DIFFERENTIAL METHOD BLD: ABNORMAL
EOSINOPHIL # BLD AUTO: 0.2 10E9/L (ref 0–0.7)
EOSINOPHIL NFR BLD AUTO: 2 %
ERYTHROCYTE [DISTWIDTH] IN BLOOD BY AUTOMATED COUNT: 23.7 % (ref 10–15)
GFR SERPL CREATININE-BSD FRML MDRD: 74 ML/MIN/1.7M2
GLUCOSE SERPL-MCNC: 87 MG/DL (ref 70–99)
HCT VFR BLD AUTO: 32.2 % (ref 40–53)
HGB BLD-MCNC: 10.5 G/DL (ref 13.3–17.7)
IMM GRANULOCYTES # BLD: 0.1 10E9/L (ref 0–0.4)
IMM GRANULOCYTES NFR BLD: 1.7 %
LYMPHOCYTES # BLD AUTO: 2 10E9/L (ref 0.8–5.3)
LYMPHOCYTES NFR BLD AUTO: 25.7 %
MAGNESIUM SERPL-MCNC: 1.3 MG/DL (ref 1.6–2.3)
MCH RBC QN AUTO: 28.9 PG (ref 26.5–33)
MCHC RBC AUTO-ENTMCNC: 32.6 G/DL (ref 31.5–36.5)
MCV RBC AUTO: 89 FL (ref 78–100)
MONOCYTES # BLD AUTO: 1 10E9/L (ref 0–1.3)
MONOCYTES NFR BLD AUTO: 13 %
NEUTROPHILS # BLD AUTO: 4.5 10E9/L (ref 1.6–8.3)
NEUTROPHILS NFR BLD AUTO: 56.8 %
NRBC # BLD AUTO: 0 10*3/UL
NRBC BLD AUTO-RTO: 0 /100
PLATELET # BLD AUTO: 395 10E9/L (ref 150–450)
POTASSIUM SERPL-SCNC: 4.6 MMOL/L (ref 3.4–5.3)
PROT SERPL-MCNC: 7.6 G/DL (ref 6.8–8.8)
RBC # BLD AUTO: 3.63 10E12/L (ref 4.4–5.9)
SODIUM SERPL-SCNC: 135 MMOL/L (ref 133–144)
WBC # BLD AUTO: 7.9 10E9/L (ref 4–11)

## 2017-10-26 PROCEDURE — 99213 OFFICE O/P EST LOW 20 MIN: CPT | Mod: ZF

## 2017-10-26 PROCEDURE — 96411 CHEMO IV PUSH ADDL DRUG: CPT

## 2017-10-26 PROCEDURE — 83735 ASSAY OF MAGNESIUM: CPT | Performed by: INTERNAL MEDICINE

## 2017-10-26 PROCEDURE — 25000128 H RX IP 250 OP 636: Mod: ZF | Performed by: PHYSICIAN ASSISTANT

## 2017-10-26 PROCEDURE — 96375 TX/PRO/DX INJ NEW DRUG ADDON: CPT

## 2017-10-26 PROCEDURE — 83735 ASSAY OF MAGNESIUM: CPT | Performed by: PHYSICIAN ASSISTANT

## 2017-10-26 PROCEDURE — 96416 CHEMO PROLONG INFUSE W/PUMP: CPT

## 2017-10-26 PROCEDURE — 25000128 H RX IP 250 OP 636: Mod: ZF | Performed by: INTERNAL MEDICINE

## 2017-10-26 PROCEDURE — 96415 CHEMO IV INFUSION ADDL HR: CPT

## 2017-10-26 PROCEDURE — 99214 OFFICE O/P EST MOD 30 MIN: CPT | Mod: ZP | Performed by: PHYSICIAN ASSISTANT

## 2017-10-26 PROCEDURE — 80053 COMPREHEN METABOLIC PANEL: CPT | Performed by: INTERNAL MEDICINE

## 2017-10-26 PROCEDURE — 85025 COMPLETE CBC W/AUTO DIFF WBC: CPT | Performed by: INTERNAL MEDICINE

## 2017-10-26 PROCEDURE — 96368 THER/DIAG CONCURRENT INF: CPT

## 2017-10-26 PROCEDURE — 96413 CHEMO IV INFUSION 1 HR: CPT

## 2017-10-26 RX ORDER — METHYLPREDNISOLONE SODIUM SUCCINATE 125 MG/2ML
125 INJECTION, POWDER, LYOPHILIZED, FOR SOLUTION INTRAMUSCULAR; INTRAVENOUS
Status: CANCELLED
Start: 2017-10-26

## 2017-10-26 RX ORDER — MAGNESIUM OXIDE 400 MG/1
400 TABLET ORAL 2 TIMES DAILY
Qty: 60 TABLET | Refills: 1 | Status: ON HOLD | OUTPATIENT
Start: 2017-10-26 | End: 2018-01-01

## 2017-10-26 RX ORDER — MAGNESIUM OXIDE 400 MG/1
400 TABLET ORAL 2 TIMES DAILY
Qty: 30 TABLET | Refills: 1 | Status: SHIPPED | OUTPATIENT
Start: 2017-10-26 | End: 2017-10-26

## 2017-10-26 RX ORDER — DIPHENOXYLATE HCL/ATROPINE 2.5-.025MG
1-2 TABLET ORAL 4 TIMES DAILY PRN
Qty: 240 TABLET | Refills: 5 | Status: ON HOLD | OUTPATIENT
Start: 2017-10-26 | End: 2018-01-01

## 2017-10-26 RX ORDER — ALBUTEROL SULFATE 0.83 MG/ML
2.5 SOLUTION RESPIRATORY (INHALATION)
Status: CANCELLED | OUTPATIENT
Start: 2017-10-26

## 2017-10-26 RX ORDER — DIPHENHYDRAMINE HYDROCHLORIDE 50 MG/ML
50 INJECTION INTRAMUSCULAR; INTRAVENOUS
Status: CANCELLED
Start: 2017-10-26

## 2017-10-26 RX ORDER — EPINEPHRINE 0.3 MG/.3ML
0.3 INJECTION SUBCUTANEOUS EVERY 5 MIN PRN
Status: CANCELLED | OUTPATIENT
Start: 2017-10-26

## 2017-10-26 RX ORDER — FLUOROURACIL 50 MG/ML
400 INJECTION, SOLUTION INTRAVENOUS ONCE
Status: COMPLETED | OUTPATIENT
Start: 2017-10-26 | End: 2017-10-26

## 2017-10-26 RX ORDER — MEPERIDINE HYDROCHLORIDE 25 MG/ML
25 INJECTION INTRAMUSCULAR; INTRAVENOUS; SUBCUTANEOUS EVERY 30 MIN PRN
Status: CANCELLED | OUTPATIENT
Start: 2017-10-26

## 2017-10-26 RX ORDER — LORAZEPAM 2 MG/ML
0.5 INJECTION INTRAMUSCULAR EVERY 4 HOURS PRN
Status: CANCELLED
Start: 2017-10-26

## 2017-10-26 RX ORDER — EPINEPHRINE 1 MG/ML
0.3 INJECTION, SOLUTION, CONCENTRATE INTRAVENOUS EVERY 5 MIN PRN
Status: CANCELLED | OUTPATIENT
Start: 2017-10-26

## 2017-10-26 RX ORDER — FLUOROURACIL 50 MG/ML
400 INJECTION, SOLUTION INTRAVENOUS ONCE
Status: CANCELLED | OUTPATIENT
Start: 2017-10-26

## 2017-10-26 RX ORDER — EPINEPHRINE 0.3 MG/.3ML
INJECTION SUBCUTANEOUS
Status: DISCONTINUED
Start: 2017-10-26 | End: 2017-10-26 | Stop reason: WASHOUT

## 2017-10-26 RX ORDER — LORAZEPAM 0.5 MG/1
0.5 TABLET ORAL EVERY 4 HOURS PRN
Qty: 30 TABLET | Refills: 5 | Status: SHIPPED | OUTPATIENT
Start: 2017-10-26 | End: 2018-01-01

## 2017-10-26 RX ORDER — HEPARIN SODIUM (PORCINE) LOCK FLUSH IV SOLN 100 UNIT/ML 100 UNIT/ML
5 SOLUTION INTRAVENOUS ONCE
Status: COMPLETED | OUTPATIENT
Start: 2017-10-26 | End: 2017-10-26

## 2017-10-26 RX ORDER — ALBUTEROL SULFATE 90 UG/1
1-2 AEROSOL, METERED RESPIRATORY (INHALATION)
Status: CANCELLED
Start: 2017-10-26

## 2017-10-26 RX ORDER — SODIUM CHLORIDE 9 MG/ML
1000 INJECTION, SOLUTION INTRAVENOUS CONTINUOUS PRN
Status: CANCELLED
Start: 2017-10-26

## 2017-10-26 RX ADMIN — SODIUM CHLORIDE, PRESERVATIVE FREE 5 ML: 5 INJECTION INTRAVENOUS at 11:03

## 2017-10-26 RX ADMIN — DEXTROSE 250 ML: 5 SOLUTION INTRAVENOUS at 12:59

## 2017-10-26 RX ADMIN — OXALIPLATIN 158 MG: 5 INJECTION, SOLUTION, CONCENTRATE INTRAVENOUS at 13:16

## 2017-10-26 RX ADMIN — DEXAMETHASONE SODIUM PHOSPHATE: 10 INJECTION, SOLUTION INTRAMUSCULAR; INTRAVENOUS at 12:59

## 2017-10-26 RX ADMIN — FLUOROURACIL 745 MG: 50 INJECTION, SOLUTION INTRAVENOUS at 15:18

## 2017-10-26 RX ADMIN — LEUCOVORIN CALCIUM 650 MG: 200 INJECTION, POWDER, LYOPHILIZED, FOR SOLUTION INTRAMUSCULAR; INTRAVENOUS at 13:16

## 2017-10-26 ASSESSMENT — PAIN SCALES - GENERAL
PAINLEVEL: NO PAIN (0)
PAINLEVEL: NO PAIN (0)

## 2017-10-26 NOTE — PROGRESS NOTES
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 Imaging performed at that time showing sigmoid wall thickening with adjacent mesenteric lymphadenopathy with free fluid near the abdominal wall mess from prior hernia surgery. He underwent a colonoscopy that showed an obstructing sigmoid colon mass. Biopsy of the mass showing sigmoid adenocarcinoma. He then developed obstructive symptoms and underwent an exploratory laparotomy on 7/10/2017. During that procedure, he was found to have diffuse peritoneal carcinomatosis. Extensive lysis of adhesions was performed and a small bowel resection was performed with end ileostomy. The biopsies from multiple large peritoneal metastases were also positive for metastatic adenocarcinoma. He started FOLFOX (5-FU, oxaliplatin, leucovorin) on 8/11/17. He comes in today for routine follow up prior to cycle 6.     INTERVAL HISTORY:   Patient reports that he had a nice trip to [place] for elk hunting, but unfortunately no one in his group was able to get an elk. He reports that his stools have been a little bit thicker than previous though he does continue to have high output from his ostomy. He is taking 2 Lomotil 4 times a day, 2 Imodium 4-5 times a day, and to Creon with meals and one with snacks. He reports getting in more than 8 cups fluids a day. He is taking in between 1823-6496  Calories per day. He has been taking his potassium supplement as directed. He reports that the cold sensitivity from oxaliplatin lasted a little over 2 weeks this time, but has since resolved. He denies any hand-foot syndrome. He did have a \"blood blister\" on his lower lip while in [place] that eventually opened up and has since resolved. He is taking lorazepam on the evenings when he is " "receiving 5-FU and otherwise takes it about twice a week for his nausea. He feels this works much better than Compazine and notes that he got headaches from Zofran. He denies other concerns.    Current Outpatient Prescriptions   Medication Sig Dispense Refill     diphenoxylate-atropine (LOMOTIL) 2.5-0.025 MG per tablet Take 1-2 tablets by mouth 4 times daily as needed for diarrhea 240 tablet 5     amylase-lipase-protease (CREON) 49011 UNITS CPEP Take 2 capsules (72,000 Units) by mouth 3 times daily (with meals) And 1 capsule with each snack tid. 240 capsule 3     LORazepam (ATIVAN) 0.5 MG tablet Take 1 tablet (0.5 mg) by mouth every 4 hours as needed (Anxiety, Nausea/Vomiting or Sleep) 30 tablet 5     opium tincture tincture Take 0.6 mLs (6 mg) by mouth 4 times daily 72 mL 0     OMEPRAZOLE PO Take 20 mg by mouth 2 times daily (before meals)       Potassium Chloride 40 MEQ/15ML (20%) SOLN Take 15 mLs (40 mEq) by mouth daily 450 mL 3     Psyllium (METAMUCIL FIBER) 51.7 % PACK Take 1 packet by mouth 3 times daily 90 each 3     Ferrous Sulfate (IRON SUPPLEMENT PO) Take 325 mg by mouth 2 times daily (with meals)        ondansetron (ZOFRAN) 8 MG tablet Take 1 tablet (8 mg) by mouth every 8 hours (Patient not taking: Reported on 10/26/2017) 90 tablet 2     prochlorperazine (COMPAZINE) 10 MG tablet Take 1 tablet (10 mg) by mouth every 6 hours as needed (Nausea/Vomiting) (Patient not taking: Reported on 10/26/2017) 30 tablet 2     cholestyramine (QUESTRAN) 4 G Packet Take 2 packets by mouth 3 times daily (with meals)       loperamide (IMODIUM) 2 MG capsule Take 4 mg by mouth 4 times daily as needed for diarrhea           No Known Allergies    PHYSICAL EXAMINATION  /78  Pulse 90  Temp 98.1  F (36.7  C) (Oral)  Resp 16  Ht 1.753 m (5' 9.02\")  Wt 70.6 kg (155 lb 10.3 oz)  SpO2 99%  BMI 22.97 kg/m2   Wt Readings from Last 10 Encounters:   10/26/17 70.6 kg (155 lb 11.2 oz)   10/26/17 70.6 kg (155 lb 10.3 oz) "   10/05/17 70.7 kg (155 lb 14.4 oz)   09/29/17 72.7 kg (160 lb 4.8 oz)   09/22/17 70.2 kg (154 lb 12.8 oz)   09/08/17 70.7 kg (155 lb 12.8 oz)   08/25/17 73.6 kg (162 lb 4.1 oz)   08/11/17 77 kg (169 lb 12.8 oz)   08/10/17 78.8 kg (173 lb 12.8 oz)   08/03/17 78.7 kg (173 lb 8 oz)   Constitutional: Alert, oriented male in no visible distress.  Eyes: PERRL. EOMI. Anicteric sclerae.  ENT/Mouth: OM moist and pink without lesions or thrush.  CV: RRR.  Resp: CTAB throughout  Abdomen: Soft, non-tender, non-distended. Bowel sounds present. No masses appreciated. No organomegaly noted. Ostomy at RLQ draining liquid brown stool with very little form.  Extremities: No lower extremity edema appreciated.  Skin: Warm, dry. No bruising or petechiae noted.  Lymph: No cervical or supraclavicular lymphadenopathy appreciated.   Neuro: CN II-XII grossly intact.    LABS:   10/26/2017 11:09   Sodium 135   Potassium 4.6   Chloride 103   Carbon Dioxide 26   Urea Nitrogen 11   Creatinine 1.04   GFR Estimate 74   GFR Estimate If Black 90   Calcium 8.7   Anion Gap 6   Albumin 2.7 (L)   Protein Total 7.6   Bilirubin Total 0.3   Alkaline Phosphatase 137   ALT 28   AST 34   Glucose 87   WBC 7.9   Hemoglobin 10.5 (L)   Hematocrit 32.2 (L)   Platelet Count 395   RBC Count 3.63 (L)   MCV 89   MCH 28.9   MCHC 32.6   RDW 23.7 (H)   Diff Method Automated Method   % Neutrophils 56.8   % Lymphocytes 25.7   % Monocytes 13.0   % Eosinophils 2.0   % Basophils 0.8   % Immature Granulocytes 1.7   Nucleated RBCs 0   Absolute Neutrophil 4.5   Absolute Lymphocytes 2.0   Absolute Monocytes 1.0   Absolute Eosinophils 0.2   Absolute Basophils 0.1   Abs Immature Granulocytes 0.1   Absolute Nucleated RBC 0.0     IMPRESSION/PLAN:  Sebas Lopez is a 52 year old male with history of ulcerative colitis, now with sigmoid adenocarcinoma with peritoneal carcinomatosis, status-post ex-lap on 7/10 with extensive lysis of adhesions and small-bowel resection with end  ileostomy.    Sigmoid cancer with peritoneal carcinomatosis:  He started on FOLFOX on 8/11/17. He is tolerating treatment well with some mild cold sensitivity and mild nausea, managed with lorazepam. He will continue with cycle 6 FOLFOX today. He will see Dr. Long in 2 weeks with a CT CAP.    - He may potentially be able to go on to cytoreductive surgery with HIPEC as mentioned in Dr. Dudley's note  - We have not received the results of MSI testing as well as NGS panel on the pathology specimen and have requested them again.    High-output ostomy: Chronic since surgery. Slightly improved with addition of Lomotil. Will continue on Imodium 2 qid- fives times/day, Lomotil 2 qid, Creon 2 with each meal and 1 with each snack. He will continue to push fluids orally.     Iron deficiency anemia: secondary to chronic blood loss. No blood loss noted recently by patient. Received Infed 8/8. Hgb has improved a little with delay in chemotherapy for trip.  Will recheck iron studies in early November.     Weight loss: Now stabilized. Patient will continue to work on a high calorie diet and has met with our dietician. Given list of high protein foods to help with hypoalbuminemia.     Hypokalemia and hypomagnesemia: Normal on 40 mEq liquid potassium, which he will continue. Will start on magnesium oxide 400 mg bid    Patient declined receiving a flu shot today.    Linda Alves PA-C  [facility]  [address], [place] [address] [phone]

## 2017-10-26 NOTE — PROGRESS NOTES
Infusion Nursing Note:  Sebas Lopez presents today for Cycle 6, Day 1 Leucovorin, Oxaliplatin, Fluorouracil bolus, and Fluorouracil pump connect.    Patient seen by provider today: Yes: Linda DE LUNA   present during visit today: Not Applicable.    Note: Patient denies complaints at this time. Reports feeling good and in good spirits.    Prior to discharge: Port is secured in place with tegaderm and flushed with 10cc NS with positive blood return noted.  Continuous home infusion Dosi-Fuser pump connected.    All connectors secured in place, dose restrictor taped to abdomen, and clamps taped open and checked by Sydnee . Patient instructed to call our clinic or [facility] with any questions or concerns at home. Patient verbalized understanding. Patient set up for pump disconnect at home with [facility] on 10/28 at 1300 and verified with Rachael DUGGAN.      Magnesium 1.3, Linda Alves notified    TORB per Linda DE LUNA on 10/26/2017 at 1525:  -If patient willing to stay, replace magnesium IV  -Will send prescription for oral magnesium to home pharmacy    Patient stated they were not willing to stay for IV magnesium replacement. This writer educated that the increased output is contributing to low magnesium, the signs and symptoms of low serum magnesium, and rationale for IV replacement followed by oral replacement. Patient acknowledged teaching and still refused IV replacement. Patient verbalized     Intravenous Access:  Implanted Port.    Treatment Conditions:  Lab Results   Component Value Date    HGB 10.5 10/26/2017     Lab Results   Component Value Date    WBC 7.9 10/26/2017      Lab Results   Component Value Date    ANEU 4.5 10/26/2017     Lab Results   Component Value Date     10/26/2017      Lab Results   Component Value Date     10/26/2017                   Lab Results   Component Value Date    POTASSIUM 4.6 10/26/2017      Lab Results   Component Value  Date    CR 1.04 10/26/2017                   Lab Results   Component Value Date    ANNAMARIE 8.7 10/26/2017                Lab Results   Component Value Date    BILITOTAL 0.3 10/26/2017           Lab Results   Component Value Date    ALBUMIN 2.7 10/26/2017                    Lab Results   Component Value Date    ALT 28 10/26/2017           Lab Results   Component Value Date    AST 34 10/26/2017     Results reviewed, labs MET treatment parameters, ok to proceed with treatment.          Post Infusion Assessment:  Patient tolerated infusion without incident.  Blood return noted pre and post infusion.  Site patent and intact, free from redness, edema or discomfort.  No evidence of extravasations.  Access discontinued per protocol.    Discharge Plan:   Prescription refills given for magnesium.  Discharge instructions reviewed with: Patient.  Patient and/or family verbalized understanding of discharge instructions and all questions answered.  AVS to patient via [facility].  Patient will return 11/9 for next appointment.   Patient discharged in stable condition accompanied by: self.  Departure Mode: Ambulatory.    Osiel Curiel RN

## 2017-10-26 NOTE — PATIENT INSTRUCTIONS
Contact Numbers    [facility] Main Line: [phone]  [facility] Triage:  [phone]    Call triage with chills and/or temperature greater than or equal to 100.5, uncontrolled nausea/vomiting, diarrhea, constipation, dizziness, shortness of breath, chest pain, bleeding, unexplained bruising, or any new/concerning symptoms, questions/concerns.     If you are having any concerning symptoms or wish to speak to a provider before your next infusion visit, please call your care coordinator or triage to notify them so we can adequately serve you.       After Hours: [phone]    If after hours, weekends, or holidays, call main hospital  and ask for Oncology doctor on call.           October 2017 Sunday Monday Tuesday Wednesday Thursday Friday Saturday   1     2     3     4     5     [facility]   11:30 AM   (15 min.)    [facility]   [facility]     [facility] RETURN   11:45 AM   (30 min.)   Albert Long MD   [facility]    1:00 PM   (240 min.)    ONCOLOGY INFUSION   [facility] 6     7       8     9     10     11     12     13     14       15     16     17     18     19     20     21       22     23     24     25     26     [facility]   10:30 AM   (15 min.)    [facility]   [facility] Draw     [facility] RETURN   10:55 AM   (50 min.)   Linda Alves PA-C   [facility]   12:30 PM   (240 min.)    ONCOLOGY INFUSION   [facility] 27     28       29     30     31 November 2017 Sunday Monday Tuesday Wednesday Thursday Friday Saturday                  1     2     3     4       5     6     [facility]   11:00 AM   (15 min.)    [facility] DRAW   [facility] Draw     CT CHEST ABDOMEN PELVIS WWO   11:25 AM   (20 min.)   CT2   [facility] CT 7     8     9     [facility]    12:00 PM   (15 min.)   [facility] LAB DRAW   [facility] Lab Draw     [facility] RETURN   12:15 PM   (30 min.)   Albert Long MD   [facility] ONC INFUSION 240    1:00 PM   (240 min.)    ONCOLOGY INFUSION   [facility] 10     11       12     13     14     15     16     17     18       19     20     21     22     23     24     25       26     27     28     29     30                            Lab Results:  Recent Results (from the past 12 hour(s))   CBC with platelets differential    Collection Time: 10/26/17 11:09 AM   Result Value Ref Range    WBC 7.9 4.0 - 11.0 10e9/L    RBC Count 3.63 (L) 4.4 - 5.9 10e12/L    Hemoglobin 10.5 (L) 13.3 - 17.7 g/dL    Hematocrit 32.2 (L) 40.0 - 53.0 %    MCV 89 78 - 100 fl    MCH 28.9 26.5 - 33.0 pg    MCHC 32.6 31.5 - 36.5 g/dL    RDW 23.7 (H) 10.0 - 15.0 %    Platelet Count 395 150 - 450 10e9/L    Diff Method Automated Method     % Neutrophils 56.8 %    % Lymphocytes 25.7 %    % Monocytes 13.0 %    % Eosinophils 2.0 %    % Basophils 0.8 %    % Immature Granulocytes 1.7 %    Nucleated RBCs 0 0 /100    Absolute Neutrophil 4.5 1.6 - 8.3 10e9/L    Absolute Lymphocytes 2.0 0.8 - 5.3 10e9/L    Absolute Monocytes 1.0 0.0 - 1.3 10e9/L    Absolute Eosinophils 0.2 0.0 - 0.7 10e9/L    Absolute Basophils 0.1 0.0 - 0.2 10e9/L    Abs Immature Granulocytes 0.1 0 - 0.4 10e9/L    Absolute Nucleated RBC 0.0    Comprehensive metabolic panel    Collection Time: 10/26/17 11:09 AM   Result Value Ref Range    Sodium 135 133 - 144 mmol/L    Potassium 4.6 3.4 - 5.3 mmol/L    Chloride 103 94 - 109 mmol/L    Carbon Dioxide 26 20 - 32 mmol/L    Anion Gap 6 3 - 14 mmol/L    Glucose 87 70 - 99 mg/dL    Urea Nitrogen 11 7 - 30 mg/dL    Creatinine 1.04 0.66 - 1.25 mg/dL    GFR Estimate 74 >60 mL/min/1.7m2    GFR Estimate If Black 90 >60 mL/min/1.7m2    Calcium 8.7 8.5 - 10.1 mg/dL    Bilirubin Total 0.3 0.2 - 1.3 mg/dL    Albumin 2.7 (L) 3.4 - 5.0 g/dL    Protein  Total 7.6 6.8 - 8.8 g/dL    Alkaline Phosphatase 137 40 - 150 U/L    ALT 28 0 - 70 U/L    AST 34 0 - 45 U/L   Magnesium    Collection Time: 10/26/17 11:09 AM   Result Value Ref Range    Magnesium 1.3 (L) 1.6 - 2.3 mg/dL

## 2017-10-26 NOTE — MR AVS SNAPSHOT
After Visit Summary   10/26/2017    Sebas Lopez    MRN: [id number]           Patient Information     Date Of Birth          1965        Visit Information        Provider Department      10/26/2017 11:10 AM Linda Alves PA-C [facility]        Today's Diagnoses     Cancer of sigmoid colon (H)    -  1    Hypomagnesemia        Diarrhea due to drug        Diarrhea, unspecified type        Peritoneal carcinomatosis (H)        Iron deficiency anemia due to chronic blood loss          Care Instructions    Tips to Increase the Protein in Your Diet  You may need more protein in your diet to help you heal from an illness, surgery or wound. Extra protein can also help you gain weight. Here are some ideas for adding high-protein foods to your meals.  Meat and fish    Add chopped cooked meat to vegetables, salads, casseroles, soups, sauces and biscuit dough.    Use in omelets, soufflés, quiches, sandwich fillings and chicken or turkey stuffing.    Wrap in pie crust or biscuit dough to make a turnover.    Add to stuffed baked potatoes.    Make a dip with diced meat or flaked fish mixed with sour cream and spices.  Chopped, hard-cooked eggs    Add to salads.    Use for snacks and sandwich filling.  High-protein milk  To make high-protein milk, mix 1 quart whole milk with 1 cup powdered milk.    Add to cream soups, mashed potatoes, scrambled eggs, cereals and dried eggnog mix.    Use as an ingredient in puddings, custards, hot chocolate, milk shakes and pancakes.  Powdered milk    If you don't have any high-protein milk on hand, you can use powdered milk. Add 3 tablespoons to:    gravies, sauces, cream soups, mayonnaise    casseroles, meat patties, meatloaf, tuna salad, deviled ham    scalloped or mashed potatoes, creamed spinach    scrambled eggs, egg salad    cereals    yogurt, milk drinks, ice cream, frozen desserts, puddings, custards.    Add 4 to 6 tablespoons powdered milk  to make:    cream sauces    breads, muffins, pancakes, waffles, cookies, cakes    cream pies, frostings, cake fillings    fruit cobblers, bread or rice pudding, gelatin desserts.    For high-protein eggnog, add 3 to 6 tablespoons powdered milk to prepared eggnog.  Hard or soft cheese    Melt on sandwiches, breads, tortillas, hamburgers, hot dogs, other meats, vegetables, eggs and pies.    Grate into soups, chili, sauces, casseroles, vegetables, potatoes, rice, noodles or meatloaf.    Eat with toast or crackers, or melt for jessika dip.  Cottage cheese or ricotta cheese    Mix with or scoop on top of fruits and vegetables.    Add to casseroles, lasagna, spaghetti, noodles and egg dishes (omelets, scrambled eggs, soufflés).    Use in gelatin, pudding-type desserts, cheesecake and pancake batter.    Use to stuff crepes, pasta shells or manicotti.  Fruit yogurt    Blend with fruits for a fruit smoothie.    Use as a dip for fruits and vegetables.    Scoop on top of pancakes or waffles.  Tofu    Blend silken tofu with fruits and juices for a smoothie.    Add chunks of firm tofu to soups and stews, or crumble into meatloaf.    Blend dried onion soup mix into soft or silken tofu for dip.    Use pureed silken tofu for part of the mayonnaise, sour cream, cream cheese or ricotta cheese called for in recipes.  Beans    Use cooked beans or peas in soups, casseroles, pasta, tacos and burritos.  Nuts and seeds  Note: Avoid in children under age 3.    Use in casseroles, breads, muffins, pancakes, cookies and waffles.    Sprinkle on fruits, cereals, ice cream, yogurt, vegetables and salads.    Mix with raisins, dried fruits and chocolate chips for a snack.  Nut butters  Note: Avoid in children under age 3.    Spread on sandwiches, toast, muffins, crackers, waffles, pancakes and fruit slices.    Use as a dip for raw vegetables.    Blend with milk drinks, or swirl through ice cream, yogurt or hot cereal.  Nutrition supplements  (nutrition bars, drinks and powders)    Add powders to milk drinks and desserts.    Mix with ice cream, milk and fruit for a high-protein milk shake.  Children under age 3 should avoid seeds, nuts, nut butters and hard pieces of fruit.  For informational purposes only. Not to replace the advice of your health care provider.  Copyright   2005 [facility]. All rights reserved. [employer] 066459 - REV 02/16.            Follow-ups after your visit        Your next 10 appointments already scheduled     Nov 06, 2017 11:00 AM CST   [facility] Draw with  [facility] DRAW   [facility] Draw ([facility])    [address]  [address] [facility] [id number] [phone]            Nov 06, 2017 11:40 AM CST   (Arrive by 11:25 AM)   CT CHEST ABDOMEN PELVIS W/O & W CONTRAST with UCCT2   [facility] CT ([facility])    [address] [id number] [phone]           Please bring any scans or X-rays taken at other hospitals, if similar tests were done. Also bring a list of your medicines, including vitamins, minerals and over-the-counter drugs. It is safest to leave personal items at home.  Be sure to tell your doctor:   If you have any allergies.   If there s any chance you are pregnant.   If you are breastfeeding.   If you have any special needs.  You may have contrast for this exam. To prepare:   Do not eat or drink for 2 hours before your exam. If you need to take medicine, you may take it with small sips of water. (We may ask you to take liquid medicine as well.)   The day before your exam, drink extra fluids at least six 8-ounce glasses (unless your doctor tells you to restrict your fluids).  Patients over 70 or patients with diabetes or kidney problems:   If you haven t had a blood test (creatinine test) within the last 30 days, go to your clinic or Diagnostic Imaging Department for this test.   If you have diabetes:   If your kidney function is normal, continue taking your metformin (Avandamet, Glucophage, Glucovance, Metaglip) on the day of your exam.   If your kidney function is abnormal, wait 48 hours before restarting this medicine.  You will have oral contrast for this exam:   You will drink the contrast at home. Get this from your clinic or Diagnostic Imaging Department. Please follow the directions given.  Please wear loose clothing, such as a sweat suit or jogging clothes. Avoid snaps, zippers and other metal. We may ask you to undress and put on a hospital gown.  If you have any questions, please call the Imaging Department where you will have your exam.            Nov 09, 2017 12:00 PM CST   [facility] with [facility] LAB DRAW   [facility] Lab Draw ([facility])    [address] [place] [id number] [phone]            Nov 09, 2017 12:30 PM CST   (Arrive by 12:15 PM)   Return Visit with Albert Long MD   [facility] ([facility])    [address] [place] [id number] [phone]            Nov 09, 2017  1:00 PM CST   Infusion 240 with  ONCOLOGY INFUSION, UC 10 ATC   [facility] ([facility])    [address] [place] [id number] [phone]              Future tests that were ordered for you today     Open Future Orders        Priority Expected Expires Ordered    *CBC with platelets differential Routine 11/6/2017 12/26/2017 10/26/2017    Ferritin Routine 11/6/2017 12/26/2017 10/26/2017    Iron and iron binding capacity Routine 11/6/2017 12/26/2017 10/26/2017            Who to contact     If you have questions or need follow up information about today's clinic visit or your schedule please contact [facility] directly at [phone].  Normal or non-critical lab and imaging  "results will be communicated to you by MyChart, letter or phone within 4 business days after the clinic has received the results. If you do not hear from us within 7 days, please contact the clinic through [employer] or phone. If you have a critical or abnormal lab result, we will notify you by phone as soon as possible.  Submit refill requests through [employer] or call your pharmacy and they will forward the refill request to us. Please allow 3 business days for your refill to be completed.          Additional Information About Your Visit        [employer] Information     [employer] gives you secure access to your electronic health record. If you see a primary care provider, you can also send messages to your care team and make appointments. If you have questions, please call your primary care clinic.  If you do not have a primary care provider, please call [phone] and they will assist you.        Care EveryWhere ID     This is your Care EveryWhere ID. This could be used by other organizations to access your [facility] medical records  [id number]        Your Vitals Were     Pulse Temperature Respirations Height Pulse Oximetry BMI (Body Mass Index)    90 98.1  F (36.7  C) (Oral) 16 1.753 m (5' 9.02\") 99% 22.97 kg/m2       Blood Pressure from Last 3 Encounters:   10/26/17 116/78   10/26/17 116/78   10/05/17 126/83    Weight from Last 3 Encounters:   10/26/17 70.6 kg (155 lb 11.2 oz)   10/26/17 70.6 kg (155 lb 10.3 oz)   10/05/17 70.7 kg (155 lb 14.4 oz)                 Today's Medication Changes          These changes are accurate as of: 10/26/17  3:38 PM.  If you have any questions, ask your nurse or doctor.               Start taking these medicines.        Dose/Directions    magnesium oxide 400 MG tablet   Commonly known as:  MAG-OX   Used for:  Hypomagnesemia   Started by:  Linda Alves PA-C        Dose:  400 mg   Take 1 tablet (400 mg) by mouth 2 times daily   Quantity:  60 tablet   Refills:  1          "   Where to get your medicines      These medications were sent to [facility] [place], [place] - [address]  [address], [facility] [address]    Hours:  TRANSPLANT PHONE NUMBER [phone] Phone:  [phone]     amylase-lipase-protease [address]4313 UNITS Cpep         These medications were sent to [facility] [place], [place] - [address]  [address] [facility] [address]     Phone:  [phone]     magnesium oxide 400 MG tablet         Some of these will need a paper prescription and others can be bought over the counter.  Ask your nurse if you have questions.     Bring a paper prescription for each of these medications     diphenoxylate-atropine 2.5-0.025 MG per tablet    LORazepam 0.5 MG tablet                Primary Care Provider Office Phone # Fax #    Jaguar Becker [phone]       [facility] [address]  [facility] [address]        Equal Access to Services     SARAH DUNN AH: Hadii aad ku hadasho Soomaali, waaxda luqadaha, qaybta kaalmada adeegyada, waxay idiin haynovan trudy sage . So [facility] [phone].    ATENCIÓN: Si habla español, tiene a cid disposición servicios gratuitos de asistencia lingüística. LlKing[facility] [place] [phone].    We comply with applicable federal civil rights laws and [place] laws. We do not discriminate on the basis of race, color, national origin, age, disability, sex, sexual orientation, or gender identity.            Thank you!     Thank you for choosing [facility]  for your care. Our goal is always to provide you with excellent care. Hearing back from our patients is one way we can continue to improve our services. Please take a few minutes to complete the written survey that you may receive in the mail after your visit with us. Thank you!             Your Updated Medication List - Protect others around you: Learn how to safely use,  store and throw away your medicines at www.disposemymeds.org.          This list is accurate as of: 10/26/17  3:38 PM.  Always use your most recent med list.                   Brand Name Dispense Instructions for use Diagnosis    amylase-lipase-protease 31620 UNITS Cpep    CREON    240 capsule    Take 2 capsules (72,000 Units) by mouth 3 times daily (with meals) And 1 capsule with each snack tid.    Diarrhea, unspecified type, Peritoneal carcinomatosis (H), Cancer of sigmoid colon (H)       cholestyramine 4 G Packet    QUESTRAN     Take 2 packets by mouth 3 times daily (with meals)        diphenoxylate-atropine 2.5-0.025 MG per tablet    LOMOTIL    240 tablet    Take 1-2 tablets by mouth 4 times daily as needed for diarrhea    Diarrhea due to drug       IRON SUPPLEMENT PO      Take 325 mg by mouth 2 times daily (with meals)        loperamide 2 MG capsule    IMODIUM     Take 4 mg by mouth 4 times daily as needed for diarrhea        LORazepam 0.5 MG tablet    ATIVAN    30 tablet    Take 1 tablet (0.5 mg) by mouth every 4 hours as needed (Anxiety, Nausea/Vomiting or Sleep)    Peritoneal carcinomatosis (H), Cancer of sigmoid colon (H)       magnesium oxide 400 MG tablet    MAG-OX    60 tablet    Take 1 tablet (400 mg) by mouth 2 times daily    Hypomagnesemia       OMEPRAZOLE PO      Take 20 mg by mouth 2 times daily (before meals)        ondansetron 8 MG tablet    ZOFRAN    90 tablet    Take 1 tablet (8 mg) by mouth every 8 hours    Peritoneal carcinomatosis (H), Cancer of sigmoid colon (H)       opium tincture tincture     72 mL    Take 0.6 mLs (6 mg) by mouth 4 times daily    Diarrhea, unspecified type, Cancer of sigmoid colon (H), Peritoneal carcinomatosis (H)       Potassium Chloride 40 MEQ/15ML (20%) Soln     450 mL    Take 15 mLs (40 mEq) by mouth daily    Hypokalemia       prochlorperazine 10 MG tablet    COMPAZINE    30 tablet    Take 1 tablet (10 mg) by mouth every 6 hours as needed (Nausea/Vomiting)     Peritoneal carcinomatosis (H), Cancer of sigmoid colon (H)       Psyllium 51.7 % Pack    METAMUCIL FIBER    90 each    Take 1 packet by mouth 3 times daily    Diarrhea, unspecified type

## 2017-10-26 NOTE — Clinical Note
10/26/2017       RE: Sebas Lopez  [address] [place] [address]     Dear Colleague,    Thank you for referring your patient, Sebas Lopez, to the [facility]. Please see a copy of my visit note below.    No notes on file    Again, thank you for allowing me to participate in the care of your patient.      Sincerely,    Linda Alves PA-C

## 2017-10-26 NOTE — NURSING NOTE
"Oncology Rooming Note    October 26, 2017 11:24 AM   Sebas Lopez is a 52 year old male who presents for:    Chief Complaint   Patient presents with     Oncology Clinic Visit     Colon CA - labs done     Initial Vitals: /78  Pulse 90  Temp 98.1  F (36.7  C) (Oral)  Resp 16  Ht 1.753 m (5' 9.02\")  Wt 70.6 kg (155 lb 10.3 oz)  SpO2 99%  BMI 22.97 kg/m2 Estimated body mass index is 22.97 kg/(m^2) as calculated from the following:    Height as of this encounter: 1.753 m (5' 9.02\").    Weight as of this encounter: 70.6 kg (155 lb 10.3 oz). Body surface area is 1.85 meters squared.  No Pain (0) Comment: Data Unavailable   No LMP for male patient.  Allergies reviewed: Yes  Medications reviewed: Yes    Medications: MEDICATION REFILLS NEEDED TODAY. Provider was notified.  Pharmacy name entered into [facility]: [facility] [place] - [address]    Clinical concerns: Lomotil and lorazepam refill needed.     10 minutes for nursing intake (face to face time)     Kelly Castro LPN            "

## 2017-10-26 NOTE — MR AVS SNAPSHOT
After Visit Summary   10/26/2017    Sebas Lopez    MRN: [id number]           Patient Information     Date Of Birth          1965        Visit Information        Provider Department      10/26/2017 12:30 PM UC 21 ATC;  ONCOLOGY INFUSION [facility]        Today's Diagnoses     Cancer of sigmoid colon (H)    -  1    Peritoneal carcinomatosis (H)          Care Instructions    Contact Numbers    [facility] Main Line: [phone]  [facility] Triage:  [phone]    Call triage with chills and/or temperature greater than or equal to 100.5, uncontrolled nausea/vomiting, diarrhea, constipation, dizziness, shortness of breath, chest pain, bleeding, unexplained bruising, or any new/concerning symptoms, questions/concerns.     If you are having any concerning symptoms or wish to speak to a provider before your next infusion visit, please call your care coordinator or triage to notify them so we can adequately serve you.       After Hours: [phone]    If after hours, weekends, or holidays, call main hospital  and ask for Oncology doctor on call.           October 2017 Sunday Monday Tuesday Wednesday Thursday Friday Saturday   1     2     3     4     5     [facility] LAB DRAW   11:30 AM   (15 min.)    [facility] LAB DRAW   [facility] Lab Draw     [facility] RETURN   11:45 AM   (30 min.)   Albert Long MD   [facility]    1:00 PM   (240 min.)    ONCOLOGY INFUSION   [facility] 6     7       8     9     10     11     12     13     14       15     16     17     18     19     20     21       22     23     24     25     26     [facility] DRAW   10:30 AM   (15 min.)    [facility] LAB DRAW   [facility] Lab Draw     [facility] RETURN   10:55 AM   (50 min.)   Linda Alves PA-C   [facility] ONC INFUSION 240   12:30 PM   (240 min.)    ONCOLOGY INFUSION   [facility]  27     28       29     30     31 November 2017 Sunday Monday Tuesday Wednesday Thursday Friday Saturday                  1     2     3     4       5     6     [facility] DRAW   11:00 AM   (15 min.)    [facility] LAB DRAW   [facility] Lab Draw     CT CHEST ABDOMEN PELVIS WWO   11:25 AM   (20 min.)   UCCT2   [facility] CT 7     8     9     [facility] DRAW   12:00 PM   (15 min.)    [facility] LAB DRAW   [facility] Lab Draw     [facility] RETURN   12:15 PM   (30 min.)   Albert Long MD   [facility] ONC INFUSION 240    1:00 PM   (240 min.)    ONCOLOGY INFUSION   [facility] [place] 10     11       12     13     14     15     16     17     18       19     20     21     22     23     24     25       26     27     28     29     30                            Lab Results:  Recent Results (from the past 12 hour(s))   CBC with platelets differential    Collection Time: 10/26/17 11:09 AM   Result Value Ref Range    WBC 7.9 4.0 - 11.0 10e9/L    RBC Count 3.63 (L) 4.4 - 5.9 10e12/L    Hemoglobin 10.5 (L) 13.3 - 17.7 g/dL    Hematocrit 32.2 (L) 40.0 - 53.0 %    MCV 89 78 - 100 fl    MCH 28.9 26.5 - 33.0 pg    MCHC 32.6 31.5 - 36.5 g/dL    RDW 23.7 (H) 10.0 - 15.0 %    Platelet Count 395 150 - 450 10e9/L    Diff Method Automated Method     % Neutrophils 56.8 %    % Lymphocytes 25.7 %    % Monocytes 13.0 %    % Eosinophils 2.0 %    % Basophils 0.8 %    % Immature Granulocytes 1.7 %    Nucleated RBCs 0 0 /100    Absolute Neutrophil 4.5 1.6 - 8.3 10e9/L    Absolute Lymphocytes 2.0 0.8 - 5.3 10e9/L    Absolute Monocytes 1.0 0.0 - 1.3 10e9/L    Absolute Eosinophils 0.2 0.0 - 0.7 10e9/L    Absolute Basophils 0.1 0.0 - 0.2 10e9/L    Abs Immature Granulocytes 0.1 0 - 0.4 10e9/L    Absolute Nucleated RBC 0.0    Comprehensive metabolic panel    Collection Time: 10/26/17 11:09 AM   Result Value Ref Range    Sodium 135 133 - 144 mmol/L     Potassium 4.6 3.4 - 5.3 mmol/L    Chloride 103 94 - 109 mmol/L    Carbon Dioxide 26 20 - 32 mmol/L    Anion Gap 6 3 - 14 mmol/L    Glucose 87 70 - 99 mg/dL    Urea Nitrogen 11 7 - 30 mg/dL    Creatinine 1.04 0.66 - 1.25 mg/dL    GFR Estimate 74 >60 mL/min/1.7m2    GFR Estimate If Black 90 >60 mL/min/1.7m2    Calcium 8.7 8.5 - 10.1 mg/dL    Bilirubin Total 0.3 0.2 - 1.3 mg/dL    Albumin 2.7 (L) 3.4 - 5.0 g/dL    Protein Total 7.6 6.8 - 8.8 g/dL    Alkaline Phosphatase 137 40 - 150 U/L    ALT 28 0 - 70 U/L    AST 34 0 - 45 U/L   Magnesium    Collection Time: 10/26/17 11:09 AM   Result Value Ref Range    Magnesium 1.3 (L) 1.6 - 2.3 mg/dL               Follow-ups after your visit        Your next 10 appointments already scheduled     Nov 06, 2017 11:00 AM CST   [facility] Draw with  [facility] LAB DRAW   [facility] Lab Draw ([facility])    [address] [place] [id number] [phone]            Nov 06, 2017 11:40 AM CST   (Arrive by 11:25 AM)   CT CHEST ABDOMEN PELVIS W/O & W CONTRAST with CT2   [facility] Imaging Center CT ([facility])    [address] [place] [address] [id number] [phone]           Please bring any scans or X-rays taken at other hospitals, if similar tests were done. Also bring a list of your medicines, including vitamins, minerals and over-the-counter drugs. It is safest to leave personal items at home.  Be sure to tell your doctor:   If you have any allergies.   If there s any chance you are pregnant.   If you are breastfeeding.   If you have any special needs.  You may have contrast for this exam. To prepare:   Do not eat or drink for 2 hours before your exam. If you need to take medicine, you may take it with small sips of water. (We may ask you to take liquid medicine as well.)   The day before your exam, drink extra fluids at least six 8-ounce glasses (unless your doctor tells you to  restrict your fluids).  Patients over 70 or patients with diabetes or kidney problems:   If you haven t had a blood test (creatinine test) within the last 30 days, go to your clinic or Diagnostic Imaging Department for this test.  If you have diabetes:   If your kidney function is normal, continue taking your metformin (Avandamet, Glucophage, Glucovance, Metaglip) on the day of your exam.   If your kidney function is abnormal, wait 48 hours before restarting this medicine.  You will have oral contrast for this exam:   You will drink the contrast at home. Get this from your clinic or Diagnostic Imaging Department. Please follow the directions given.  Please wear loose clothing, such as a sweat suit or jogging clothes. Avoid snaps, zippers and other metal. We may ask you to undress and put on a hospital gown.  If you have any questions, please call the Imaging Department where you will have your exam.            Nov 09, 2017 12:00 PM CST   [facility] Lab Draw with UC [facility] LAB DRAW   [facility] Lab Draw ([facility])    [address] [place] [id number]   [phone]            Nov 09, 2017 12:30 PM CST   (Arrive by 12:15 PM)   Return Visit with Albert Long MD   [facility] ([facility])    [address] [place] [id number]   [phone]            Nov 09, 2017  1:00 PM CST   Infusion 240 with  ONCOLOGY INFUSION, UC 10 ATC   [facility] ([facility])    [address] [place] [id number]   [phone]              Future tests that were ordered for you today     Open Future Orders        Priority Expected Expires Ordered    *CBC with platelets differential Routine 11/6/2017 12/26/2017 10/26/2017    Ferritin Routine 11/6/2017 12/26/2017 10/26/2017    Iron and iron binding capacity Routine 11/6/2017 12/26/2017 10/26/2017             Who to contact     If you have questions or need follow up information about today's clinic visit or your schedule please contact [facility] directly at [phone].  Normal or non-critical lab and imaging results will be communicated to you by [employer], letter or phone within 4 business days after the clinic has received the results. If you do not hear from us within 7 days, please contact the clinic through [employer] or phone. If you have a critical or abnormal lab result, we will notify you by phone as soon as possible.  Submit refill requests through [employer] or call your pharmacy and they will forward the refill request to us. Please allow 3 business days for your refill to be completed.          Additional Information About Your Visit        [employer] Information     [employer] gives you secure access to your electronic health record. If you see a primary care provider, you can also send messages to your care team and make appointments. If you have questions, please call your primary care clinic.  If you do not have a primary care provider, please call [phone] and they will assist you.        Care EveryWhere ID     This is your Care EveryWhere ID. This could be used by other organizations to access your [facility] medical records  [id number]        Your Vitals Were     Pulse Temperature Respirations Pulse Oximetry BMI (Body Mass Index)       90 98.1  F (36.7  C) 18 99% 22.98 kg/m2        Blood Pressure from Last 3 Encounters:   10/26/17 116/78   10/26/17 116/78   10/05/17 126/83    Weight from Last 3 Encounters:   10/26/17 70.6 kg (155 lb 11.2 oz)   10/26/17 70.6 kg (155 lb 10.3 oz)   10/05/17 70.7 kg (155 lb 14.4 oz)              We Performed the Following     CBC with platelets differential     Comprehensive metabolic panel     Magnesium          Today's Medication Changes          These changes are accurate as of: 10/26/17  4:03 PM.  If you have any questions, ask your nurse or doctor.                Start taking these medicines.        Dose/Directions    magnesium oxide 400 MG tablet   Commonly known as:  MAG-OX   Used for:  Hypomagnesemia   Started by:  Linda Alves PA-C        Dose:  400 mg   Take 1 tablet (400 mg) by mouth 2 times daily   Quantity:  60 tablet   Refills:  1            Where to get your medicines      These medications were sent to [place], [place] - [address]  [address], [facility] [address]    Hours:  TRANSPLANT PHONE NUMBER [phone] Phone:  [phone]     amylase-lipase-protease 90225 UNITS Cpep         These medications were sent to [address] [address]     Phone:  [phone]     magnesium oxide 400 MG tablet         Some of these will need a paper prescription and others can be bought over the counter.  Ask your nurse if you have questions.     Bring a paper prescription for each of these medications     diphenoxylate-atropine 2.5-0.025 MG per tablet    LORazepam 0.5 MG tablet                Primary Care Provider Office Phone # Fax #    Jaguar Becker [phone]       [place] [facility] [address]  [facility] [address]        Equal Access to Services     SARAH DUNN AH: Cristina reddy hadasho Soomaali, waaxda luqadaha, qaybta kaalmada adeegyada, waxay stella willis. So [facility] [phone].    ATENCIÓN: Si habla español, tiene a cid disposición servicios gratuitos de asistencia lingüística. Llame al [phone].    We comply with applicable federal civil rights laws and [place] laws. We do not discriminate on the basis of race, color, national origin, age, disability, sex, sexual orientation, or gender identity.            Thank you!     Thank you for choosing [facility]  for your care. Our goal is always to provide you with  excellent care. Hearing back from our patients is one way we can continue to improve our services. Please take a few minutes to complete the written survey that you may receive in the mail after your visit with us. Thank you!             Your Updated Medication List - Protect others around you: Learn how to safely use, store and throw away your medicines at www.disposemymeds.org.          This list is accurate as of: 10/26/17  4:03 PM.  Always use your most recent med list.                   Brand Name Dispense Instructions for use Diagnosis    amylase-lipase-protease 79708 UNITS Cpep    CREON    240 capsule    Take 2 capsules (72,000 Units) by mouth 3 times daily (with meals) And 1 capsule with each snack tid.    Diarrhea, unspecified type, Peritoneal carcinomatosis (H), Cancer of sigmoid colon (H)       cholestyramine 4 G Packet    QUESTRAN     Take 2 packets by mouth 3 times daily (with meals)        diphenoxylate-atropine 2.5-0.025 MG per tablet    LOMOTIL    240 tablet    Take 1-2 tablets by mouth 4 times daily as needed for diarrhea    Diarrhea due to drug       IRON SUPPLEMENT PO      Take 325 mg by mouth 2 times daily (with meals)        loperamide 2 MG capsule    IMODIUM     Take 4 mg by mouth 4 times daily as needed for diarrhea        LORazepam 0.5 MG tablet    ATIVAN    30 tablet    Take 1 tablet (0.5 mg) by mouth every 4 hours as needed (Anxiety, Nausea/Vomiting or Sleep)    Peritoneal carcinomatosis (H), Cancer of sigmoid colon (H)       magnesium oxide 400 MG tablet    MAG-OX    60 tablet    Take 1 tablet (400 mg) by mouth 2 times daily    Hypomagnesemia       OMEPRAZOLE PO      Take 20 mg by mouth 2 times daily (before meals)        ondansetron 8 MG tablet    ZOFRAN    90 tablet    Take 1 tablet (8 mg) by mouth every 8 hours    Peritoneal carcinomatosis (H), Cancer of sigmoid colon (H)       opium tincture tincture     72 mL    Take 0.6 mLs (6 mg) by mouth 4 times daily    Diarrhea, unspecified type,  Cancer of sigmoid colon (H), Peritoneal carcinomatosis (H)       Potassium Chloride 40 MEQ/15ML (20%) Soln     450 mL    Take 15 mLs (40 mEq) by mouth daily    Hypokalemia       prochlorperazine 10 MG tablet    COMPAZINE    30 tablet    Take 1 tablet (10 mg) by mouth every 6 hours as needed (Nausea/Vomiting)    Peritoneal carcinomatosis (H), Cancer of sigmoid colon (H)       Psyllium 51.7 % Pack    METAMUCIL FIBER    90 each    Take 1 packet by mouth 3 times daily    Diarrhea, unspecified type

## 2017-10-26 NOTE — LETTER
"10/26/2017      RE: Sebas Lopez  [address]  [facility] [address]       HEMATOLOGY/ONCOLOGY PROGRESS NOTE  Oct 26, 2017    REASON FOR VISIT: follow-up of sigmoid adenocarcinoma with peritoneal carcinomatosis    DIAGNOSIS:   Sebas Lopez is a 52 year old male with history of ulcerative colitis who presented with abdominal pain in May 2017. Imaging performed at that time showing sigmoid wall thickening with adjacent mesenteric lymphadenopathy with free fluid near the abdominal wall mess from prior hernia surgery. He underwent a colonoscopy that showed an obstructing sigmoid colon mass. Biopsy of the mass showing sigmoid adenocarcinoma. He then developed obstructive symptoms and underwent an exploratory laparotomy on 7/10/2017. During that procedure, he was found to have diffuse peritoneal carcinomatosis. Extensive lysis of adhesions was performed and a small bowel resection was performed with end ileostomy. The biopsies from multiple large peritoneal metastases were also positive for metastatic adenocarcinoma. He started FOLFOX (5-FU, oxaliplatin, leucovorin) on 8/11/17. He comes in today for routine follow up prior to cycle 6.     INTERVAL HISTORY:   Patient reports that he had a nice trip to [place] for [place] hunting, but unfortunately no one in his group was able to get an elk. He reports that his stools have been a little bit thicker than previous though he does continue to have high output from his ostomy. He is taking 2 Lomotil 4 times a day, 2 Imodium 4-5 times a day, and to Creon with meals and one with snacks. He reports getting in more than 8 cups fluids a day. He is taking in between 7033-2984  Calories per day. He has been taking his potassium supplement as directed. He reports that the cold sensitivity from oxaliplatin lasted a little over 2 weeks this time, but has since resolved. He denies any hand-foot syndrome. He did have a \"blood blister\" on his lower lip while in [place] that eventually " "opened up and has since resolved. He is taking lorazepam on the evenings when he is receiving 5-FU and otherwise takes it about twice a week for his nausea. He feels this works much better than Compazine and notes that he got headaches from Zofran. He denies other concerns.    Current Outpatient Prescriptions   Medication Sig Dispense Refill     diphenoxylate-atropine (LOMOTIL) 2.5-0.025 MG per tablet Take 1-2 tablets by mouth 4 times daily as needed for diarrhea 240 tablet 5     amylase-lipase-protease (CREON) 36914 UNITS CPEP Take 2 capsules (72,000 Units) by mouth 3 times daily (with meals) And 1 capsule with each snack tid. 240 capsule 3     LORazepam (ATIVAN) 0.5 MG tablet Take 1 tablet (0.5 mg) by mouth every 4 hours as needed (Anxiety, Nausea/Vomiting or Sleep) 30 tablet 5     opium tincture tincture Take 0.6 mLs (6 mg) by mouth 4 times daily 72 mL 0     OMEPRAZOLE PO Take 20 mg by mouth 2 times daily (before meals)       Potassium Chloride 40 MEQ/15ML (20%) SOLN Take 15 mLs (40 mEq) by mouth daily 450 mL 3     Psyllium (METAMUCIL FIBER) 51.7 % PACK Take 1 packet by mouth 3 times daily 90 each 3     Ferrous Sulfate (IRON SUPPLEMENT PO) Take 325 mg by mouth 2 times daily (with meals)        ondansetron (ZOFRAN) 8 MG tablet Take 1 tablet (8 mg) by mouth every 8 hours (Patient not taking: Reported on 10/26/2017) 90 tablet 2     prochlorperazine (COMPAZINE) 10 MG tablet Take 1 tablet (10 mg) by mouth every 6 hours as needed (Nausea/Vomiting) (Patient not taking: Reported on 10/26/2017) 30 tablet 2     cholestyramine (QUESTRAN) 4 G Packet Take 2 packets by mouth 3 times daily (with meals)       loperamide (IMODIUM) 2 MG capsule Take 4 mg by mouth 4 times daily as needed for diarrhea           No Known Allergies    PHYSICAL EXAMINATION  /78  Pulse 90  Temp 98.1  F (36.7  C) (Oral)  Resp 16  Ht 1.753 m (5' 9.02\")  Wt 70.6 kg (155 lb 10.3 oz)  SpO2 99%  BMI 22.97 kg/m2   Wt Readings from Last 10 " Encounters:   10/26/17 70.6 kg (155 lb 11.2 oz)   10/26/17 70.6 kg (155 lb 10.3 oz)   10/05/17 70.7 kg (155 lb 14.4 oz)   09/29/17 72.7 kg (160 lb 4.8 oz)   09/22/17 70.2 kg (154 lb 12.8 oz)   09/08/17 70.7 kg (155 lb 12.8 oz)   08/25/17 73.6 kg (162 lb 4.1 oz)   08/11/17 77 kg (169 lb 12.8 oz)   08/10/17 78.8 kg (173 lb 12.8 oz)   08/03/17 78.7 kg (173 lb 8 oz)   Constitutional: Alert, oriented male in no visible distress.  Eyes: PERRL. EOMI. Anicteric sclerae.  ENT/Mouth: OM moist and pink without lesions or thrush.  CV: RRR.  Resp: CTAB throughout  Abdomen: Soft, non-tender, non-distended. Bowel sounds present. No masses appreciated. No organomegaly noted. Ostomy at RLQ draining liquid brown stool with very little form.  Extremities: No lower extremity edema appreciated.  Skin: Warm, dry. No bruising or petechiae noted.  Lymph: No cervical or supraclavicular lymphadenopathy appreciated.   Neuro: CN II-XII grossly intact.    LABS:   10/26/2017 11:09   Sodium 135   Potassium 4.6   Chloride 103   Carbon Dioxide 26   Urea Nitrogen 11   Creatinine 1.04   GFR Estimate 74   GFR Estimate If Black 90   Calcium 8.7   Anion Gap 6   Albumin 2.7 (L)   Protein Total 7.6   Bilirubin Total 0.3   Alkaline Phosphatase 137   ALT 28   AST 34   Glucose 87   WBC 7.9   Hemoglobin 10.5 (L)   Hematocrit 32.2 (L)   Platelet Count 395   RBC Count 3.63 (L)   MCV 89   MCH 28.9   MCHC 32.6   RDW 23.7 (H)   Diff Method Automated Method   % Neutrophils 56.8   % Lymphocytes 25.7   % Monocytes 13.0   % Eosinophils 2.0   % Basophils 0.8   % Immature Granulocytes 1.7   Nucleated RBCs 0   Absolute Neutrophil 4.5   Absolute Lymphocytes 2.0   Absolute Monocytes 1.0   Absolute Eosinophils 0.2   Absolute Basophils 0.1   Abs Immature Granulocytes 0.1   Absolute Nucleated RBC 0.0     IMPRESSION/PLAN:  Sebas Lopez is a 52 year old male with history of ulcerative colitis, now with sigmoid adenocarcinoma with peritoneal carcinomatosis, status-post  ex-lap on 7/10 with extensive lysis of adhesions and small-bowel resection with end ileostomy.    Sigmoid cancer with peritoneal carcinomatosis:  He started on FOLFOX on 8/11/17. He is tolerating treatment well with some mild cold sensitivity and mild nausea, managed with lorazepam. He will continue with cycle 6 FOLFOX today. He will see Dr. Long in 2 weeks with a CT CAP.    - He may potentially be able to go on to cytoreductive surgery with HIPEC as mentioned in Dr. Dudley's note  - We have not received the results of MSI testing as well as NGS panel on the pathology specimen and have requested them again.    High-output ostomy: Chronic since surgery. Slightly improved with addition of Lomotil. Will continue on Imodium 2 qid- fives times/day, Lomotil 2 qid, Creon 2 with each meal and 1 with each snack. He will continue to push fluids orally.     Iron deficiency anemia: secondary to chronic blood loss. No blood loss noted recently by patient. Received Infed 8/8. Hgb has improved a little with delay in chemotherapy for trip.  Will recheck iron studies in early November.     Weight loss: Now stabilized. Patient will continue to work on a high calorie diet and has met with our dietician. Given list of high protein foods to help with hypoalbuminemia.     Hypokalemia and hypomagnesemia: Normal on 40 mEq liquid potassium, which he will continue. Will start on magnesium oxide 400 mg bid    Patient declined receiving a flu shot today.    Linda Alves PA-C  [facility]  [address] [place] [address] [id number] [phone]

## 2017-10-26 NOTE — NURSING NOTE
Chief Complaint   Patient presents with     Port Draw     VS done, labs collected via portacath with power needle by RN.  Heparin locked.  Hx colon CA.

## 2017-10-26 NOTE — PATIENT INSTRUCTIONS
Tips to Increase the Protein in Your Diet  You may need more protein in your diet to help you heal from an illness, surgery or wound. Extra protein can also help you gain weight. Here are some ideas for adding high-protein foods to your meals.  Meat and fish    Add chopped cooked meat to vegetables, salads, casseroles, soups, sauces and biscuit dough.    Use in omelets, soufflés, quiches, sandwich fillings and chicken or turkey stuffing.    Wrap in pie crust or biscuit dough to make a turnover.    Add to stuffed baked potatoes.    Make a dip with diced meat or flaked fish mixed with sour cream and spices.  Chopped, hard-cooked eggs    Add to salads.    Use for snacks and sandwich filling.  High-protein milk  To make high-protein milk, mix 1 quart whole milk with 1 cup powdered milk.    Add to cream soups, mashed potatoes, scrambled eggs, cereals and dried eggnog mix.    Use as an ingredient in puddings, custards, hot chocolate, milk shakes and pancakes.  Powdered milk    If you don't have any high-protein milk on hand, you can use powdered milk. Add 3 tablespoons to:    gravies, sauces, cream soups, mayonnaise    casseroles, meat patties, meatloaf, tuna salad, deviled ham    scalloped or mashed potatoes, creamed spinach    scrambled eggs, egg salad    cereals    yogurt, milk drinks, ice cream, frozen desserts, puddings, custards.    Add 4 to 6 tablespoons powdered milk to make:    cream sauces    breads, muffins, pancakes, waffles, cookies, cakes    cream pies, frostings, cake fillings    fruit cobblers, bread or rice pudding, gelatin desserts.    For high-protein eggnog, add 3 to 6 tablespoons powdered milk to prepared eggnog.  Hard or soft cheese    Melt on sandwiches, breads, tortillas, hamburgers, hot dogs, other meats, vegetables, eggs and pies.    Grate into soups, chili, sauces, casseroles, vegetables, potatoes, rice, noodles or meatloaf.    Eat with toast or crackers, or melt for jessika dip.  Cottage cheese  or ricotta cheese    Mix with or scoop on top of fruits and vegetables.    Add to casseroles, lasagna, spaghetti, noodles and egg dishes (omelets, scrambled eggs, soufflés).    Use in gelatin, pudding-type desserts, cheesecake and pancake batter.    Use to stuff crepes, pasta shells or manicotti.  Fruit yogurt    Blend with fruits for a fruit smoothie.    Use as a dip for fruits and vegetables.    Scoop on top of pancakes or waffles.  Tofu    Blend silken tofu with fruits and juices for a smoothie.    Add chunks of firm tofu to soups and stews, or crumble into meatloaf.    Blend dried onion soup mix into soft or silken tofu for dip.    Use pureed silken tofu for part of the mayonnaise, sour cream, cream cheese or ricotta cheese called for in recipes.  Beans    Use cooked beans or peas in soups, casseroles, pasta, tacos and burritos.  Nuts and seeds  Note: Avoid in children under age 3.    Use in casseroles, breads, muffins, pancakes, cookies and waffles.    Sprinkle on fruits, cereals, ice cream, yogurt, vegetables and salads.    Mix with raisins, dried fruits and chocolate chips for a snack.  Nut butters  Note: Avoid in children under age 3.    Spread on sandwiches, toast, muffins, crackers, waffles, pancakes and fruit slices.    Use as a dip for raw vegetables.    Blend with milk drinks, or swirl through ice cream, yogurt or hot cereal.  Nutrition supplements (nutrition bars, drinks and powders)    Add powders to milk drinks and desserts.    Mix with ice cream, milk and fruit for a high-protein milk shake.  Children under age 3 should avoid seeds, nuts, nut butters and hard pieces of fruit.  For informational purposes only. Not to replace the advice of your health care provider.  Copyright   2005 [facility]. All rights reserved. [employer] 006805 - REV 02/16.

## 2017-10-31 DIAGNOSIS — C18.7 CANCER OF SIGMOID COLON (H): Primary | ICD-10-CM

## 2017-10-31 DIAGNOSIS — C78.6 PERITONEAL CARCINOMATOSIS (H): ICD-10-CM

## 2017-10-31 PROCEDURE — 81301 MICROSATELLITE INSTABILITY: CPT | Performed by: INTERNAL MEDICINE

## 2017-11-01 ENCOUNTER — APPOINTMENT (OUTPATIENT)
Dept: LAB | Facility: CLINIC | Age: 52
End: 2017-11-01
Attending: INTERNAL MEDICINE
Payer: COMMERCIAL

## 2017-11-01 PROCEDURE — 00000159 ZZHCL STATISTIC H-SEND OUTS PREP: Performed by: INTERNAL MEDICINE

## 2017-11-02 LAB — COPATH REPORT: NORMAL

## 2017-11-06 DIAGNOSIS — C78.6 PERITONEAL CARCINOMATOSIS (H): ICD-10-CM

## 2017-11-06 DIAGNOSIS — C18.7 CANCER OF SIGMOID COLON (H): ICD-10-CM

## 2017-11-06 DIAGNOSIS — E87.6 HYPOKALEMIA: ICD-10-CM

## 2017-11-06 DIAGNOSIS — D50.0 IRON DEFICIENCY ANEMIA DUE TO CHRONIC BLOOD LOSS: ICD-10-CM

## 2017-11-06 DIAGNOSIS — R19.7 DIARRHEA, UNSPECIFIED TYPE: ICD-10-CM

## 2017-11-06 LAB
ALBUMIN SERPL-MCNC: 2.9 G/DL (ref 3.4–5)
ALP SERPL-CCNC: 125 U/L (ref 40–150)
ALT SERPL W P-5'-P-CCNC: 30 U/L (ref 0–70)
ANION GAP SERPL CALCULATED.3IONS-SCNC: 8 MMOL/L (ref 3–14)
AST SERPL W P-5'-P-CCNC: 27 U/L (ref 0–45)
BASOPHILS # BLD AUTO: 0 10E9/L (ref 0–0.2)
BASOPHILS NFR BLD AUTO: 0.5 %
BILIRUB SERPL-MCNC: 0.4 MG/DL (ref 0.2–1.3)
BUN SERPL-MCNC: 15 MG/DL (ref 7–30)
CALCIUM SERPL-MCNC: 8.7 MG/DL (ref 8.5–10.1)
CHLORIDE SERPL-SCNC: 105 MMOL/L (ref 94–109)
CO2 SERPL-SCNC: 22 MMOL/L (ref 20–32)
CREAT SERPL-MCNC: 1.02 MG/DL (ref 0.66–1.25)
DIFFERENTIAL METHOD BLD: ABNORMAL
EOSINOPHIL # BLD AUTO: 0.1 10E9/L (ref 0–0.7)
EOSINOPHIL NFR BLD AUTO: 1.6 %
ERYTHROCYTE [DISTWIDTH] IN BLOOD BY AUTOMATED COUNT: 20.7 % (ref 10–15)
FERRITIN SERPL-MCNC: 655 NG/ML (ref 26–388)
GFR SERPL CREATININE-BSD FRML MDRD: 76 ML/MIN/1.7M2
GLUCOSE SERPL-MCNC: 111 MG/DL (ref 70–99)
HCT VFR BLD AUTO: 31.9 % (ref 40–53)
HGB BLD-MCNC: 10.5 G/DL (ref 13.3–17.7)
IMM GRANULOCYTES # BLD: 0 10E9/L (ref 0–0.4)
IMM GRANULOCYTES NFR BLD: 0.2 %
IRON SATN MFR SERPL: 7 % (ref 15–46)
IRON SERPL-MCNC: 24 UG/DL (ref 35–180)
LYMPHOCYTES # BLD AUTO: 1.6 10E9/L (ref 0.8–5.3)
LYMPHOCYTES NFR BLD AUTO: 25.9 %
MCH RBC QN AUTO: 29.4 PG (ref 26.5–33)
MCHC RBC AUTO-ENTMCNC: 32.9 G/DL (ref 31.5–36.5)
MCV RBC AUTO: 89 FL (ref 78–100)
MONOCYTES # BLD AUTO: 1.2 10E9/L (ref 0–1.3)
MONOCYTES NFR BLD AUTO: 18.6 %
NEUTROPHILS # BLD AUTO: 3.4 10E9/L (ref 1.6–8.3)
NEUTROPHILS NFR BLD AUTO: 53.2 %
NRBC # BLD AUTO: 0 10*3/UL
NRBC BLD AUTO-RTO: 0 /100
PLATELET # BLD AUTO: 146 10E9/L (ref 150–450)
POTASSIUM SERPL-SCNC: 4.4 MMOL/L (ref 3.4–5.3)
PROT SERPL-MCNC: 7.9 G/DL (ref 6.8–8.8)
RBC # BLD AUTO: 3.57 10E12/L (ref 4.4–5.9)
SODIUM SERPL-SCNC: 136 MMOL/L (ref 133–144)
TIBC SERPL-MCNC: 349 UG/DL (ref 240–430)
WBC # BLD AUTO: 6.3 10E9/L (ref 4–11)

## 2017-11-06 PROCEDURE — 80053 COMPREHEN METABOLIC PANEL: CPT | Performed by: PHYSICIAN ASSISTANT

## 2017-11-06 PROCEDURE — 85025 COMPLETE CBC W/AUTO DIFF WBC: CPT | Performed by: PHYSICIAN ASSISTANT

## 2017-11-06 PROCEDURE — 83550 IRON BINDING TEST: CPT | Performed by: PHYSICIAN ASSISTANT

## 2017-11-06 PROCEDURE — 25000128 H RX IP 250 OP 636: Performed by: INTERNAL MEDICINE

## 2017-11-06 PROCEDURE — 82728 ASSAY OF FERRITIN: CPT | Performed by: PHYSICIAN ASSISTANT

## 2017-11-06 PROCEDURE — 83540 ASSAY OF IRON: CPT | Performed by: PHYSICIAN ASSISTANT

## 2017-11-06 RX ORDER — HEPARIN SODIUM (PORCINE) LOCK FLUSH IV SOLN 100 UNIT/ML 100 UNIT/ML
500 SOLUTION INTRAVENOUS ONCE
Status: COMPLETED | OUTPATIENT
Start: 2017-11-06 | End: 2017-11-06

## 2017-11-06 RX ADMIN — SODIUM CHLORIDE, PRESERVATIVE FREE 500 UNITS: 5 INJECTION INTRAVENOUS at 11:11

## 2017-11-06 NOTE — NURSING NOTE
Chief Complaint   Patient presents with     Port Draw     Labs Drawn      Port accessed. Labs drawn. Flushed with heparin and NS. Port left accessed for CT scan.     Lauren Schoen, RN

## 2017-11-08 ENCOUNTER — CARE COORDINATION (OUTPATIENT)
Dept: ONCOLOGY | Facility: CLINIC | Age: 52
End: 2017-11-08

## 2017-11-08 ENCOUNTER — APPOINTMENT (OUTPATIENT)
Dept: LAB | Facility: CLINIC | Age: 52
End: 2017-11-08
Attending: PATHOLOGY
Payer: COMMERCIAL

## 2017-11-09 ENCOUNTER — INFUSION THERAPY VISIT (OUTPATIENT)
Dept: ONCOLOGY | Facility: CLINIC | Age: 52
End: 2017-11-09
Attending: INTERNAL MEDICINE
Payer: COMMERCIAL

## 2017-11-09 ENCOUNTER — APPOINTMENT (OUTPATIENT)
Dept: LAB | Facility: CLINIC | Age: 52
End: 2017-11-09
Attending: INTERNAL MEDICINE
Payer: COMMERCIAL

## 2017-11-09 VITALS
HEART RATE: 87 BPM | WEIGHT: 150.6 LBS | OXYGEN SATURATION: 96 % | TEMPERATURE: 98.9 F | HEIGHT: 69 IN | DIASTOLIC BLOOD PRESSURE: 72 MMHG | SYSTOLIC BLOOD PRESSURE: 112 MMHG | RESPIRATION RATE: 16 BRPM | BODY MASS INDEX: 22.31 KG/M2

## 2017-11-09 DIAGNOSIS — C18.7 CANCER OF SIGMOID COLON (H): ICD-10-CM

## 2017-11-09 DIAGNOSIS — E83.42 HYPOMAGNESEMIA: Primary | ICD-10-CM

## 2017-11-09 DIAGNOSIS — C78.6 PERITONEAL CARCINOMATOSIS (H): ICD-10-CM

## 2017-11-09 DIAGNOSIS — C78.6 PERITONEAL CARCINOMATOSIS (H): Primary | ICD-10-CM

## 2017-11-09 LAB
ALBUMIN SERPL-MCNC: 3.1 G/DL (ref 3.4–5)
ALP SERPL-CCNC: 156 U/L (ref 40–150)
ALT SERPL W P-5'-P-CCNC: 28 U/L (ref 0–70)
ANION GAP SERPL CALCULATED.3IONS-SCNC: 8 MMOL/L (ref 3–14)
ANISOCYTOSIS BLD QL SMEAR: ABNORMAL
AST SERPL W P-5'-P-CCNC: 28 U/L (ref 0–45)
BASOPHILS # BLD AUTO: 0.1 10E9/L (ref 0–0.2)
BASOPHILS NFR BLD AUTO: 1.8 %
BILIRUB SERPL-MCNC: 0.5 MG/DL (ref 0.2–1.3)
BUN SERPL-MCNC: 11 MG/DL (ref 7–30)
CALCIUM SERPL-MCNC: 9.5 MG/DL (ref 8.5–10.1)
CHLORIDE SERPL-SCNC: 102 MMOL/L (ref 94–109)
CO2 SERPL-SCNC: 22 MMOL/L (ref 20–32)
CREAT SERPL-MCNC: 1.06 MG/DL (ref 0.66–1.25)
DIFFERENTIAL METHOD BLD: ABNORMAL
EOSINOPHIL # BLD AUTO: 0.1 10E9/L (ref 0–0.7)
EOSINOPHIL NFR BLD AUTO: 3.5 %
ERYTHROCYTE [DISTWIDTH] IN BLOOD BY AUTOMATED COUNT: 19.5 % (ref 10–15)
GFR SERPL CREATININE-BSD FRML MDRD: 73 ML/MIN/1.7M2
GLUCOSE SERPL-MCNC: 90 MG/DL (ref 70–99)
HCT VFR BLD AUTO: 30.8 % (ref 40–53)
HGB BLD-MCNC: 10 G/DL (ref 13.3–17.7)
LYMPHOCYTES # BLD AUTO: 1.7 10E9/L (ref 0.8–5.3)
LYMPHOCYTES NFR BLD AUTO: 44.7 %
MAGNESIUM SERPL-MCNC: 1.2 MG/DL (ref 1.6–2.3)
MCH RBC QN AUTO: 29.2 PG (ref 26.5–33)
MCHC RBC AUTO-ENTMCNC: 32.5 G/DL (ref 31.5–36.5)
MCV RBC AUTO: 90 FL (ref 78–100)
MONOCYTES # BLD AUTO: 0.9 10E9/L (ref 0–1.3)
MONOCYTES NFR BLD AUTO: 23.7 %
NEUTROPHILS # BLD AUTO: 1 10E9/L (ref 1.6–8.3)
NEUTROPHILS NFR BLD AUTO: 26.3 %
NRBC # BLD AUTO: 0 10*3/UL
NRBC BLD AUTO-RTO: 1 /100
PLATELET # BLD AUTO: 213 10E9/L (ref 150–450)
POTASSIUM SERPL-SCNC: 4.5 MMOL/L (ref 3.4–5.3)
PROT SERPL-MCNC: 8.1 G/DL (ref 6.8–8.8)
RBC # BLD AUTO: 3.43 10E12/L (ref 4.4–5.9)
SODIUM SERPL-SCNC: 132 MMOL/L (ref 133–144)
WBC # BLD AUTO: 3.7 10E9/L (ref 4–11)

## 2017-11-09 PROCEDURE — 83735 ASSAY OF MAGNESIUM: CPT | Performed by: INTERNAL MEDICINE

## 2017-11-09 PROCEDURE — 99214 OFFICE O/P EST MOD 30 MIN: CPT | Mod: ZP | Performed by: INTERNAL MEDICINE

## 2017-11-09 PROCEDURE — 25000128 H RX IP 250 OP 636: Mod: ZF | Performed by: INTERNAL MEDICINE

## 2017-11-09 PROCEDURE — 96365 THER/PROPH/DIAG IV INF INIT: CPT

## 2017-11-09 PROCEDURE — 85025 COMPLETE CBC W/AUTO DIFF WBC: CPT | Performed by: INTERNAL MEDICINE

## 2017-11-09 PROCEDURE — 81445 SO NEO GSAP 5-50DNA/DNA&RNA: CPT | Performed by: INTERNAL MEDICINE

## 2017-11-09 PROCEDURE — 80053 COMPREHEN METABOLIC PANEL: CPT | Performed by: INTERNAL MEDICINE

## 2017-11-09 RX ORDER — HEPARIN SODIUM (PORCINE) LOCK FLUSH IV SOLN 100 UNIT/ML 100 UNIT/ML
5 SOLUTION INTRAVENOUS
Status: COMPLETED | OUTPATIENT
Start: 2017-11-09 | End: 2017-11-09

## 2017-11-09 RX ORDER — HEPARIN SODIUM (PORCINE) LOCK FLUSH IV SOLN 100 UNIT/ML 100 UNIT/ML
5 SOLUTION INTRAVENOUS EVERY 8 HOURS
Status: DISCONTINUED | OUTPATIENT
Start: 2017-11-09 | End: 2017-11-09 | Stop reason: HOSPADM

## 2017-11-09 RX ORDER — MAGNESIUM SULFATE HEPTAHYDRATE 40 MG/ML
2 INJECTION, SOLUTION INTRAVENOUS
Status: COMPLETED | OUTPATIENT
Start: 2017-11-09 | End: 2017-11-09

## 2017-11-09 RX ADMIN — SODIUM CHLORIDE, PRESERVATIVE FREE 5 ML: 5 INJECTION INTRAVENOUS at 12:52

## 2017-11-09 RX ADMIN — MAGNESIUM SULFATE IN WATER 2 G: 40 INJECTION, SOLUTION INTRAVENOUS at 14:24

## 2017-11-09 RX ADMIN — SODIUM CHLORIDE, PRESERVATIVE FREE 5 ML: 5 INJECTION INTRAVENOUS at 15:39

## 2017-11-09 ASSESSMENT — PAIN SCALES - GENERAL: PAINLEVEL: NO PAIN (0)

## 2017-11-09 NOTE — NURSING NOTE
"Chief Complaint   Patient presents with     Port Draw     port accessed and labs drawn by rn.  vs taken.     Port accessed with 20g 3/4\" power needle, labs drawn, port flushed with saline and heparin, vitals checked.  Elo Miles RN    "

## 2017-11-09 NOTE — PROGRESS NOTES
Oncology follow up visit:  Date on this visit: 11/9/2017        CC  sigmoid adenocarcinoma with peritoneal carcinomatosis    Primary Physician: Waylon Hna     History Of Present Illness:     Please see previous note for details. I have copied and updated from prior notes.   Sebas is a 52-year-old male who has a history of ulcerative colitis diagnosed more than 20 years ago, but he has not had medical management for that.  He was doing well, but in 05/2017 he presented with a few weeks of abdominal pain.  At that time, his imaging showed that he had a sigmoid wall thickening with adjacent mesenteric lymphadenopathy and free fluid near the abdominal wall mesh from his prior hernia surgery.  He subsequently underwent a colonoscopy which was incomplete and showed an obstructing sigmoid colon mass.  The biopsy from the sigmoid mass showed sigmoid adenocarcinoma.  He then developed obstructive symptoms and underwent exploratory laparotomy on 07/10/2017.  During that he was found to have diffuse peritoneal carcinomatosis.  Extensive lysis of adhesions was performed and a small bowel resection was performed with end ileostomy.  The biopsies from the multiple large peritoneal metastasis also were positive for metastatic adenocarcinoma. We still haven't received testing on MSI or NGS panel back     He started palliative FOLFOX on 8/11/17. C#6 given on 10/26/17    He also had iron deficiency anemia for which he received intravenous infed    Interval history  He comes in today and tells me that he is feeling well.  He does feel cold sensitivity for a couple of weeks, but he does not have any lingering tingling or numbness when he is in a warmer temperature.  He denies any pain.  His ostomy output is about 1 liter, and he continues to use Metamucil, Lomotil, Imodium and tincture of opium.  He is able to eat and drink well without any nausea or vomiting.  He thinks that his appetite has not been great, but he is able to  take in adequate calories.  He is trying to keep himself well-hydrated.  He continues to take Creon.  He denies any interval infections.  He has no new swellings.  He has no bleeding.  His energy is fair.       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     Metastatic adenocarcinoma (H)      Ulcerative colitis (H)      Past Surgical History:   Procedure Laterality Date     COLONOSCOPY  2017     GI SURGERY  07/10/2017    Extensive lysis of adhesions, small bowel resection with end ileostomy.      HERNIA REPAIR       INSERT PORT VASCULAR ACCESS Right 8/10/2017    Procedure: INSERT PORT VASCULAR ACCESS;  Single Lumen Right Chest Power Port;  Surgeon: Malena Andrew PA-C;  Location: [facility] OR     ORTHOPEDIC SURGERY Left     HIP ARTHROPLASTY      Ulcerative colitis.  Left hip replacement.       Cancer History:   As above    Allergies:  Allergies as of 11/09/2017     (No Known Allergies)     Current Medications:  Current Outpatient Prescriptions   Medication Sig Dispense Refill     diphenoxylate-atropine (LOMOTIL) 2.5-0.025 MG per tablet Take 1-2 tablets by mouth 4 times daily as needed for diarrhea 240 tablet 5     amylase-lipase-protease (CREON) 76591 UNITS CPEP Take 2 capsules (72,000 Units) by mouth 3 times daily (with meals) And 1 capsule with each snack tid. 240 capsule 3     LORazepam (ATIVAN) 0.5 MG tablet Take 1 tablet (0.5 mg) by mouth every 4 hours as needed (Anxiety, Nausea/Vomiting or Sleep) 30 tablet 5     magnesium oxide (MAG-OX) 400 MG tablet Take 1 tablet (400 mg) by mouth 2 times daily 60 tablet 1     opium tincture tincture Take 0.6 mLs (6 mg) by mouth 4 times daily 72 mL 0     OMEPRAZOLE PO Take 20 mg by mouth 2 times daily (before meals)        Potassium Chloride 40 MEQ/15ML (20%) SOLN Take 15 mLs (40 mEq) by mouth daily 450 mL 3     ondansetron (ZOFRAN) 8 MG tablet Take 1 tablet (8 mg) by mouth every 8 hours (Patient not taking: Reported on 10/26/2017) 90 tablet 2     Psyllium (METAMUCIL FIBER) 51.7 % PACK Take 1 packet by mouth 3 times daily 90 each 3     prochlorperazine (COMPAZINE) 10 MG tablet Take 1 tablet (10 mg) by mouth every 6 hours as needed (Nausea/Vomiting) (Patient not taking: Reported on 10/26/2017) 30 tablet 2     cholestyramine (QUESTRAN) 4 G Packet Take 2 packets by mouth 3 times daily (with meals)       loperamide (IMODIUM) 2 MG capsule Take 4 mg by mouth 4 times daily as needed for diarrhea        Ferrous Sulfate (IRON SUPPLEMENT PO) Take 325 mg by mouth 2 times daily (with meals)         Family History:  No family history on file.   No family history of cancer.  He does not have any kids.       Social History:  Social History     Social History     Marital status: Single     Spouse name: N/A     Number of children: N/A     Years of education: N/A     Occupational History     Not on file.     Social History Main Topics     Smoking status: Never Smoker     Smokeless tobacco: Never Used     Alcohol use 3.0 oz/week     5 Cans of beer per week     Drug use: No     Sexual activity: Not on file     Other Topics Concern     Not on file     Social History Narrative   He does not smoke and does not drink.  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  He lives with his girlfriend, Bessie.       Physical Exam:  /72 (BP Location: Right arm, Patient Position: Sitting, Cuff Size: Adult Regular)  Pulse 87  Temp 98.9  F (37.2  C) (Oral)  Resp 16  Wt 68.3 kg (150 lb 9.6 oz)  SpO2 96%  BMI 22.23 kg/m2  CONSTITUTIONAL: no acute distress  EYES: PERRLA, no palor or icterus.   ENT/MOUTH: no mouth lesions. Oropharynx normal  CVS: s1s2 no m r g .   RESPIRATORY: clear to auscultation b/l  GI: soft non tender no hepatosplenomegaly. Ostomy  site is clean  NEURO: AAOX3  Grossly non focal neuro exam  INTEGUMENT: no obvious rashes  LYMPHATIC: no palpable cervical, supraclavicular, axillary LAD  MUSCULOSKELETAL: Unremarkable. No bony tenderness.   EXTREMITIES: no edema  PSYCH: Mentation, mood and affect are normal. Decision making capacity is intact        Laboratory/Imaging Studies    Reviewed      CT [facility] 11/6/17  Impression:   1. Revisualization of the sigmoid colon mass with adjacent mesenteric  adenopathy, not significantly changed.  2. No significant change in mesenteric nodules.  3. No evidence of metastatic disease in the liver or chest.  4. Postsurgical changes of loop ileostomy. No evidence of obstruction.  5. No significant change in small indeterminate cysts in the kidneys,  continued attention follow-up.       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  The sigmoid carcinoma is obstructing, requiring exploratory laparotomy and end ileostomy along with small bowel resection.    We have not received the results of MSI testing as well as NGS panel on the pathology specimen and we have requested it again. His blocks will need to be sent to [facility] to do the testing     He has been started on FOLFOX palliative chemotherapy. After 6 cycles, he has stable disease    I will have him reevaluated by Dr. Dudley for debulking surgery with HIPEC, so I will hold C#7 today till we get a plan from surgery    High ostomy output.  His ostomy is still draining more than 100 mL daily.  He will continue to use Metamucil in addition to Lomotil, Imodium and tincture of opium.      Nutrition:  This has been fair, and he is able to eat and drink well.  For the most part, his weight has remained stable.  I encouraged him to continue eating healthy.       Cold sensitivity is due to oxaliplatin, although it is better now.  He does not have any ongoing neuropathy.       He has minimal fatigue due to chemotherapy and cancer.   I encouraged him to remain active and exercise regularly.       Hypomagnesemia:  Today the magnesium level is 1.2, and I will give him 2 grams of IV magnesium.     Previously he has received IV iron. His hemoglobin is stable now. Repeat ferritin is elevated and it shows findings c/w anemia of chronic disease. MCV has improved     We will determine follow up after discussing plans of surgery with Dr Dudley    I answered all of his questions to his satisfaction and he is agreeable and comfortable with the plan         Albert Long

## 2017-11-09 NOTE — MR AVS SNAPSHOT
After Visit Summary   11/9/2017    Sebas Lpoez    MRN: [id number]           Patient Information     Date Of Birth          1965        Visit Information        Provider Department      11/9/2017 12:30 PM Albert Long MD [facility]        Today's Diagnoses     Hypomagnesemia    -  1    Cancer of sigmoid colon (H)        Peritoneal carcinomatosis (H)          Care Instructions    Hold chemo today    Give IV Magnesium 2 gram today    Will discuss with Dr Dudley regarding possibility of surgery    Will determine follow up after that          Follow-ups after your visit        Your next 10 appointments already scheduled     Nov 20, 2017  9:30 AM CST   (Arrive by 9:15 AM)   Return Visit with Rob Dudley MD   [facility] ([facility])    [address] [place] [id number] [phone]              Who to contact     If you have questions or need follow up information about today's clinic visit or your schedule please contact [facility] directly at [phone].  Normal or non-critical lab and imaging results will be communicated to you by MyChart, letter or phone within 4 business days after the clinic has received the results. If you do not hear from us within 7 days, please contact the clinic through [employer] or phone. If you have a critical or abnormal lab result, we will notify you by phone as soon as possible.  Submit refill requests through [employer] or call your pharmacy and they will forward the refill request to us. Please allow 3 business days for your refill to be completed.          Additional Information About Your Visit        MyChart Information     [employer] gives you secure access to your electronic health record. If you see a primary care provider, you can also send messages to your care team and make appointments. If you have questions, please call your  "primary care clinic.  If you do not have a primary care provider, please call [phone] and they will assist you.        Care EveryWhere ID     This is your Care EveryWhere ID. This could be used by other organizations to access your [facility] medical records  [id number]        Your Vitals Were     Pulse Temperature Respirations Height Pulse Oximetry BMI (Body Mass Index)    87 98.9  F (37.2  C) (Oral) 16 1.753 m (5' 9\") 96% 22.24 kg/m2       Blood Pressure from Last 3 Encounters:   11/09/17 112/72   10/26/17 116/78   10/26/17 116/78    Weight from Last 3 Encounters:   11/09/17 68.3 kg (150 lb 9.6 oz)   10/26/17 70.6 kg (155 lb 11.2 oz)   10/26/17 70.6 kg (155 lb 10.3 oz)              We Performed the Following     Next Generation Sequencing Oncology: Colorectal Cancer Panel        Primary Care Provider Office Phone # Fax #    Jaguar Becker [phone]       [facility] [address]  [facility] [address]        Equal Access to Services     [facility]: Hadii aad ku hadasho Soomaali, waaxda luqadaha, qaybta kaalmada adeegyada, bhargav sage . So [facility] [phone].    ATENCIÓN: Si habla español, tiene a cdi disposición servicios gratuitos de asistencia lingüística. Llame al [phone].    We comply with applicable federal civil rights laws and [place] laws. We do not discriminate on the basis of race, color, national origin, age, disability, sex, sexual orientation, or gender identity.            Thank you!     Thank you for choosing [facility]  for your care. Our goal is always to provide you with excellent care. Hearing back from our patients is one way we can continue to improve our services. Please take a few minutes to complete the written survey that you may receive in the mail after your visit with us. Thank you!             Your Updated Medication List - Protect others around you: Learn how to safely use, store and throw away your " medicines at www.disposemymeds.org.          This list is accurate as of: 11/9/17 11:59 PM.  Always use your most recent med list.                   Brand Name Dispense Instructions for use Diagnosis    amylase-lipase-protease 20344 UNITS Cpep    CREON    240 capsule    Take 2 capsules (72,000 Units) by mouth 3 times daily (with meals) And 1 capsule with each snack tid.    Diarrhea, unspecified type, Peritoneal carcinomatosis (H), Cancer of sigmoid colon (H)       cholestyramine 4 G Packet    QUESTRAN     Take 2 packets by mouth 3 times daily (with meals)        diphenoxylate-atropine 2.5-0.025 MG per tablet    LOMOTIL    240 tablet    Take 1-2 tablets by mouth 4 times daily as needed for diarrhea    Diarrhea due to drug       IRON SUPPLEMENT PO      Take 325 mg by mouth 2 times daily (with meals)        loperamide 2 MG capsule    IMODIUM     Take 4 mg by mouth 4 times daily as needed for diarrhea        LORazepam 0.5 MG tablet    ATIVAN    30 tablet    Take 1 tablet (0.5 mg) by mouth every 4 hours as needed (Anxiety, Nausea/Vomiting or Sleep)    Peritoneal carcinomatosis (H), Cancer of sigmoid colon (H)       magnesium oxide 400 MG tablet    MAG-OX    60 tablet    Take 1 tablet (400 mg) by mouth 2 times daily    Hypomagnesemia       OMEPRAZOLE PO      Take 20 mg by mouth 2 times daily (before meals)        ondansetron 8 MG tablet    ZOFRAN    90 tablet    Take 1 tablet (8 mg) by mouth every 8 hours    Peritoneal carcinomatosis (H), Cancer of sigmoid colon (H)       opium tincture tincture     72 mL    Take 0.6 mLs (6 mg) by mouth 4 times daily    Diarrhea, unspecified type, Cancer of sigmoid colon (H), Peritoneal carcinomatosis (H)       Potassium Chloride 40 MEQ/15ML (20%) Soln     450 mL    Take 15 mLs (40 mEq) by mouth daily    Hypokalemia       prochlorperazine 10 MG tablet    COMPAZINE    30 tablet    Take 1 tablet (10 mg) by mouth every 6 hours as needed (Nausea/Vomiting)    Peritoneal carcinomatosis (H),  Cancer of sigmoid colon (H)       Psyllium 51.7 % Pack    METAMUCIL FIBER    90 each    Take 1 packet by mouth 3 times daily    Diarrhea, unspecified type

## 2017-11-09 NOTE — LETTER
11/9/2017       RE: Sebas Lopez  [address] [place] [place] [address]     Dear Colleague,    Thank you for referring your patient, Sebas Lopez, to the [facility]. Please see a copy of my visit note below.    Oncology follow up visit:  Date on this visit: 11/9/2017        CC  sigmoid adenocarcinoma with peritoneal carcinomatosis    Primary Physician: Waylon Han     History Of Present Illness:     Please see previous note for details. I have copied and updated from prior notes.   Sebas is a 52-year-old male who has a history of ulcerative colitis diagnosed more than 20 years ago, but he has not had medical management for that.  He was doing well, but in 05/2017 he presented with a few weeks of abdominal pain.  At that time, his imaging showed that he had a sigmoid wall thickening with adjacent mesenteric lymphadenopathy and free fluid near the abdominal wall mesh from his prior hernia surgery.  He subsequently underwent a colonoscopy which was incomplete and showed an obstructing sigmoid colon mass.  The biopsy from the sigmoid mass showed sigmoid adenocarcinoma.  He then developed obstructive symptoms and underwent exploratory laparotomy on 07/10/2017.  During that he was found to have diffuse peritoneal carcinomatosis.  Extensive lysis of adhesions was performed and a small bowel resection was performed with end ileostomy.  The biopsies from the multiple large peritoneal metastasis also were positive for metastatic adenocarcinoma. We still haven't received testing on MSI or NGS panel back     He started palliative FOLFOX on 8/11/17. C#6 given on 10/26/17    He also had iron deficiency anemia for which he received intravenous infed    Interval history  He comes in today and tells me that he is feeling well.  He does feel cold sensitivity for a couple of weeks, but he does not have any lingering tingling or numbness when he is in a warmer temperature.  He denies any pain.  His  ostomy output is about 1 liter, and he continues to use Metamucil, Lomotil, Imodium and tincture of opium.  He is able to eat and drink well without any nausea or vomiting.  He thinks that his appetite has not been great, but he is able to take in adequate calories.  He is trying to keep himself well-hydrated.  He continues to take Creon.  He denies any interval infections.  He has no new swellings.  He has no bleeding.  His energy is fair.       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     Metastatic adenocarcinoma (H)      Ulcerative colitis (H)      Past Surgical History:   Procedure Laterality Date     COLONOSCOPY  2017     GI SURGERY  07/10/2017    Extensive lysis of adhesions, small bowel resection with end ileostomy.      HERNIA REPAIR       INSERT PORT VASCULAR ACCESS Right 8/10/2017    Procedure: INSERT PORT VASCULAR ACCESS;  Single Lumen Right Chest Power Port;  Surgeon: Malena Andrew PA-C;  [facility]     ORTHOPEDIC SURGERY Left     HIP ARTHROPLASTY      Ulcerative colitis.  Left hip replacement.       Cancer History:   As above    Allergies:  Allergies as of 11/09/2017     (No Known Allergies)     Current Medications:  Current Outpatient Prescriptions   Medication Sig Dispense Refill     diphenoxylate-atropine (LOMOTIL) 2.5-0.025 MG per tablet Take 1-2 tablets by mouth 4 times daily as needed for diarrhea 240 tablet 5     amylase-lipase-protease (CREON) 94752 UNITS CPEP Take 2 capsules (72,000 Units) by mouth 3 times daily (with meals) And 1 capsule with each snack tid. 240 capsule 3     LORazepam (ATIVAN) 0.5 MG tablet Take 1 tablet (0.5 mg) by mouth every 4 hours as needed (Anxiety, Nausea/Vomiting or Sleep) 30 tablet 5     magnesium oxide  (MAG-OX) 400 MG tablet Take 1 tablet (400 mg) by mouth 2 times daily 60 tablet 1     opium tincture tincture Take 0.6 mLs (6 mg) by mouth 4 times daily 72 mL 0     OMEPRAZOLE PO Take 20 mg by mouth 2 times daily (before meals)       Potassium Chloride 40 MEQ/15ML (20%) SOLN Take 15 mLs (40 mEq) by mouth daily 450 mL 3     ondansetron (ZOFRAN) 8 MG tablet Take 1 tablet (8 mg) by mouth every 8 hours (Patient not taking: Reported on 10/26/2017) 90 tablet 2     Psyllium (METAMUCIL FIBER) 51.7 % PACK Take 1 packet by mouth 3 times daily 90 each 3     prochlorperazine (COMPAZINE) 10 MG tablet Take 1 tablet (10 mg) by mouth every 6 hours as needed (Nausea/Vomiting) (Patient not taking: Reported on 10/26/2017) 30 tablet 2     cholestyramine (QUESTRAN) 4 G Packet Take 2 packets by mouth 3 times daily (with meals)       loperamide (IMODIUM) 2 MG capsule Take 4 mg by mouth 4 times daily as needed for diarrhea        Ferrous Sulfate (IRON SUPPLEMENT PO) Take 325 mg by mouth 2 times daily (with meals)         Family History:  No family history on file.   No family history of cancer.  He does not have any kids.       Social History:  Social History     Social History     Marital status: Single     Spouse name: N/A     Number of children: N/A     Years of education: N/A     Occupational History     Not on file.     Social History Main Topics     Smoking status: Never Smoker     Smokeless tobacco: Never Used     Alcohol use 3.0 oz/week     5 Cans of beer per week     Drug use: No     Sexual activity: Not on file     Other Topics Concern     Not on file     Social History Narrative   He does not smoke and does not drink.  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  He lives with his girlfriend, Bessie.       Physical Exam:  /72 (BP Location: Right arm, Patient Position: Sitting, Cuff Size: Adult Regular)  Pulse 87  Temp 98.9  F (37.2  C) (Oral)  Resp 16  Wt 68.3 kg (150 lb 9.6 oz)  SpO2 96%  BMI 22.23  kg/m2  CONSTITUTIONAL: no acute distress  EYES: PERRLA, no palor or icterus.   ENT/MOUTH: no mouth lesions. Oropharynx normal  CVS: s1s2 no m r g .   RESPIRATORY: clear to auscultation b/l  GI: soft non tender no hepatosplenomegaly. Ostomy site is clean  NEURO: AAOX3  Grossly non focal neuro exam  INTEGUMENT: no obvious rashes  LYMPHATIC: no palpable cervical, supraclavicular, axillary LAD  MUSCULOSKELETAL: Unremarkable. No bony tenderness.   EXTREMITIES: no edema  PSYCH: Mentation, mood and affect are normal. Decision making capacity is intact        Laboratory/Imaging Studies    Reviewed      CT [facility] 11/6/17  Impression:   1. Revisualization of the sigmoid colon mass with adjacent mesenteric  adenopathy, not significantly changed.  2. No significant change in mesenteric nodules.  3. No evidence of metastatic disease in the liver or chest.  4. Postsurgical changes of loop ileostomy. No evidence of obstruction.  5. No significant change in small indeterminate cysts in the kidneys,  continued attention follow-up.       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  The sigmoid carcinoma is obstructing, requiring exploratory laparotomy and end ileostomy along with small bowel resection.    We have not received the results of MSI testing as well as NGS panel on the pathology specimen and we have requested it again. His blocks will need to be sent to [facility] to do the testing     He has been started on FOLFOX palliative chemotherapy. After 6 cycles, he has stable disease    I will have him reevaluated by Dr. Dudley for debulking surgery with HIPEC, so I will hold C#7 today till we get a plan from surgery    High ostomy output.  His ostomy is still draining more than 100 mL daily.  He will continue to use Metamucil in addition to Lomotil, Imodium and tincture of opium.      Nutrition:  This has been fair, and he is able to eat and drink well.  For the most part, his weight has  remained stable.  I encouraged him to continue eating healthy.       Cold sensitivity is due to oxaliplatin, although it is better now.  He does not have any ongoing neuropathy.       He has minimal fatigue due to chemotherapy and cancer.  I encouraged him to remain active and exercise regularly.       Hypomagnesemia:  Today the magnesium level is 1.2, and I will give him 2 grams of IV magnesium.     Previously he has received IV iron. His hemoglobin is stable now. Repeat ferritin is elevated and it shows findings c/w anemia of chronic disease. MCV has improved     We will determine follow up after discussing plans of surgery with Dr Dudley    I answered all of his questions to his satisfaction and he is agreeable and comfortable with the plan         Albert Long

## 2017-11-09 NOTE — MR AVS SNAPSHOT
After Visit Summary   11/9/2017    Sebas Lopez    MRN: [id number]           Patient Information     Date Of Birth          1965        Visit Information        Provider Department      11/9/2017 1:00 PM [facility] ATC;  ONCOLOGY INFUSION [facility]        Today's Diagnoses     Peritoneal carcinomatosis (H)    -  1    Cancer of sigmoid colon (H)          Care Instructions    Contact Numbers    [facility] Main Line: [phone]  [facility] Triage:  [phone]    Call triage with chills and/or temperature greater than or equal to 100.5, uncontrolled nausea/vomiting, diarrhea, constipation, dizziness, shortness of breath, chest pain, bleeding, unexplained bruising, or any new/concerning symptoms, questions/concerns.     If you are having any concerning symptoms or wish to speak to a provider before your next infusion visit, please call your care coordinator or triage to notify them so we can adequately serve you.       After Hours: [phone]    If after hours, weekends, or holidays, call main hospital  and ask for Oncology doctor on call.         November 2017 Sunday Monday Tuesday Wednesday Thursday Friday Saturday                  1     LAB   11:00 AM   (15 min.)   SPECIMEN MGMT   [facility], Lab 2     3     4       5     6     [facility] LAB DRAW   11:00 AM   (15 min.)   [facility] LAB DRAW   [facility] Lab Draw     CT CHEST ABDOMEN PELVIS WWO   11:25 AM   (20 min.)   UCCT2   [facility] CT 7     8     LAB    6:00 AM   (15 min.)   [facility] LAB MAIL IN   [facility] Laboratory Services 9     [facility]   12:00 PM   (15 min.)    [facility] LAB DRAW   [facility] Lab Draw     [facility] RETURN   12:15 PM   (30 min.)   Albert Long MD   [facility] ONC INFUSION 240    1:00 PM   (240 min.)    ONCOLOGY INFUSION   [facility] 10     11       12     13     14     15     16     17     18       19      20     21     22     23     24     25       26     27 28 29 30 December 2017 Sunday Monday Tuesday Wednesday Thursday Friday Saturday                            1     2       3     4     5     6     7     8     9       10     11     12     13     14     15     16       17     18     19     20     21     22     23       24     25     26     27     28 29 30 31                                                Lab Results:  Recent Results (from the past 12 hour(s))   Magnesium    Collection Time: 11/09/17 12:57 PM   Result Value Ref Range    Magnesium 1.2 (L) 1.6 - 2.3 mg/dL   CBC with platelets differential    Collection Time: 11/09/17 12:57 PM   Result Value Ref Range    WBC 3.7 (L) 4.0 - 11.0 10e9/L    RBC Count 3.43 (L) 4.4 - 5.9 10e12/L    Hemoglobin 10.0 (L) 13.3 - 17.7 g/dL    Hematocrit 30.8 (L) 40.0 - 53.0 %    MCV 90 78 - 100 fl    MCH 29.2 26.5 - 33.0 pg    MCHC 32.5 31.5 - 36.5 g/dL    RDW 19.5 (H) 10.0 - 15.0 %    Platelet Count 213 150 - 450 10e9/L    Diff Method Manual Differential     % Neutrophils 26.3 %    % Lymphocytes 44.7 %    % Monocytes 23.7 %    % Eosinophils 3.5 %    % Basophils 1.8 %    Nucleated RBCs 1 (H) 0 /100    Absolute Neutrophil 1.0 (L) 1.6 - 8.3 10e9/L    Absolute Lymphocytes 1.7 0.8 - 5.3 10e9/L    Absolute Monocytes 0.9 0.0 - 1.3 10e9/L    Absolute Eosinophils 0.1 0.0 - 0.7 10e9/L    Absolute Basophils 0.1 0.0 - 0.2 10e9/L    Absolute Nucleated RBC 0.0     Anisocytosis Moderate    Comprehensive metabolic panel    Collection Time: 11/09/17 12:57 PM   Result Value Ref Range    Sodium 132 (L) 133 - 144 mmol/L    Potassium 4.5 3.4 - 5.3 mmol/L    Chloride 102 94 - 109 mmol/L    Carbon Dioxide 22 20 - 32 mmol/L    Anion Gap 8 3 - 14 mmol/L    Glucose 90 70 - 99 mg/dL    Urea Nitrogen 11 7 - 30 mg/dL    Creatinine 1.06 0.66 - 1.25 mg/dL    GFR Estimate 73 >60 mL/min/1.7m2    GFR Estimate If Black 88 >60 mL/min/1.7m2    Calcium 9.5 8.5 -  10.1 mg/dL    Bilirubin Total 0.5 0.2 - 1.3 mg/dL    Albumin 3.1 (L) 3.4 - 5.0 g/dL    Protein Total 8.1 6.8 - 8.8 g/dL    Alkaline Phosphatase 156 (H) 40 - 150 U/L    ALT 28 0 - 70 U/L    AST 28 0 - 45 U/L               Follow-ups after your visit        Who to contact     If you have questions or need follow up information about today's clinic visit or your schedule please contact [facility] directly at [phone].  Normal or non-critical lab and imaging results will be communicated to you by CiDRAhart, letter or phone within 4 business days after the clinic has received the results. If you do not hear from us within 7 days, please contact the clinic through [employer] or phone. If you have a critical or abnormal lab result, we will notify you by phone as soon as possible.  Submit refill requests through [employer] or call your pharmacy and they will forward the refill request to us. Please allow 3 business days for your refill to be completed.          Additional Information About Your Visit        MyChart Information     [employer] gives you secure access to your electronic health record. If you see a primary care provider, you can also send messages to your care team and make appointments. If you have questions, please call your primary care clinic.  If you do not have a primary care provider, please call [phone] and they will assist you.        Care EveryWhere ID     This is your Care EveryWhere ID. This could be used by other organizations to access your [place] medical records  [id number]         Blood Pressure from Last 3 Encounters:   11/09/17 112/72   10/26/17 116/78   10/26/17 116/78    Weight from Last 3 Encounters:   11/09/17 68.3 kg (150 lb 9.6 oz)   10/26/17 70.6 kg (155 lb 11.2 oz)   10/26/17 70.6 kg (155 lb 10.3 oz)              We Performed the Following     CBC with platelets differential     Comprehensive metabolic panel     Magnesium        Primary Care Provider Office Phone  # Fax #    Jaguar Becker [phone]       [facility] [address]  [place] [address]        Equal Access to Services     SARAH DUNN : Cristina karrie reddy cady Funk, wahayderda luqchaka, qajeota kamaddyda lewis, bhargav choibrice ortizbenita lawrence laginabrice willis. So [facility] [phone].    ATENCIÓN: Si habla español, tiene a cid disposición servicios gratuitos de asistencia lingüística. Llame al [phone].    We comply with applicable federal civil rights laws and [place] laws. We do not discriminate on the basis of race, color, national origin, age, disability, sex, sexual orientation, or gender identity.            Thank you!     Thank you for choosing [facility]  for your care. Our goal is always to provide you with excellent care. Hearing back from our patients is one way we can continue to improve our services. Please take a few minutes to complete the written survey that you may receive in the mail after your visit with us. Thank you!             Your Updated Medication List - Protect others around you: Learn how to safely use, store and throw away your medicines at www.disposemymeds.org.          This list is accurate as of: 11/9/17  2:56 PM.  Always use your most recent med list.                   Brand Name Dispense Instructions for use Diagnosis    amylase-lipase-protease 55596 UNITS Cpep    CREON    240 capsule    Take 2 capsules (72,000 Units) by mouth 3 times daily (with meals) And 1 capsule with each snack tid.    Diarrhea, unspecified type, Peritoneal carcinomatosis (H), Cancer of sigmoid colon (H)       cholestyramine 4 G Packet    QUESTRAN     Take 2 packets by mouth 3 times daily (with meals)        diphenoxylate-atropine 2.5-0.025 MG per tablet    LOMOTIL    240 tablet    Take 1-2 tablets by mouth 4 times daily as needed for diarrhea    Diarrhea due to drug       IRON SUPPLEMENT PO      Take 325 mg by mouth 2 times daily (with meals)        loperamide 2 MG  capsule    IMODIUM     Take 4 mg by mouth 4 times daily as needed for diarrhea        LORazepam 0.5 MG tablet    ATIVAN    30 tablet    Take 1 tablet (0.5 mg) by mouth every 4 hours as needed (Anxiety, Nausea/Vomiting or Sleep)    Peritoneal carcinomatosis (H), Cancer of sigmoid colon (H)       magnesium oxide 400 MG tablet    MAG-OX    60 tablet    Take 1 tablet (400 mg) by mouth 2 times daily    Hypomagnesemia       OMEPRAZOLE PO      Take 20 mg by mouth 2 times daily (before meals)        ondansetron 8 MG tablet    ZOFRAN    90 tablet    Take 1 tablet (8 mg) by mouth every 8 hours    Peritoneal carcinomatosis (H), Cancer of sigmoid colon (H)       opium tincture tincture     72 mL    Take 0.6 mLs (6 mg) by mouth 4 times daily    Diarrhea, unspecified type, Cancer of sigmoid colon (H), Peritoneal carcinomatosis (H)       Potassium Chloride 40 MEQ/15ML (20%) Soln     450 mL    Take 15 mLs (40 mEq) by mouth daily    Hypokalemia       prochlorperazine 10 MG tablet    COMPAZINE    30 tablet    Take 1 tablet (10 mg) by mouth every 6 hours as needed (Nausea/Vomiting)    Peritoneal carcinomatosis (H), Cancer of sigmoid colon (H)       Psyllium 51.7 % Pack    METAMUCIL FIBER    90 each    Take 1 packet by mouth 3 times daily    Diarrhea, unspecified type

## 2017-11-09 NOTE — PROGRESS NOTES
Infusion Nursing Note:  Sebas Lopez presents today for magnesium replacement.    Patient seen by provider today: Yes: Dr. Long   present during visit today: Not Applicable.    Note: 11/9/17 1417  TORB: Dr. Long/Jess Hawkins, RN:  -Give 2 grams magnesium. No recheck needed  -hold chemo today    Intravenous Access:  Implanted Port.    Treatment Conditions:  Lab Results   Component Value Date    HGB 10.0 11/09/2017     Lab Results   Component Value Date    WBC 3.7 11/09/2017      Lab Results   Component Value Date    ANEU 1.0 11/09/2017     Lab Results   Component Value Date     11/09/2017      Lab Results   Component Value Date     11/09/2017                   Lab Results   Component Value Date    POTASSIUM 4.5 11/09/2017           Lab Results   Component Value Date    MAG 1.2 11/09/2017            Lab Results   Component Value Date    CR 1.06 11/09/2017                   Lab Results   Component Value Date    ANNAMARIE 9.5 11/09/2017                Lab Results   Component Value Date    BILITOTAL 0.5 11/09/2017           Lab Results   Component Value Date    ALBUMIN 3.1 11/09/2017                    Lab Results   Component Value Date    ALT 28 11/09/2017           Lab Results   Component Value Date    AST 28 11/09/2017             Post Infusion Assessment:  Patient tolerated infusion without incident.  Blood return noted pre and post infusion.  Site patent and intact, free from redness, edema or discomfort.  No evidence of extravasations.  Access discontinued per protocol.    Discharge Plan:   Patient declined prescription refills.  Discharge instructions reviewed with: Patient.  Patient and/or family verbalized understanding of discharge instructions and all questions answered.  Copy of AVS reviewed with patient and/or family.  Care coordinator will f/u with patient regarding future appointments.  Pt aware to also call if he does not hear from the clinic.   Patient discharged in stable  condition accompanied by: family  Departure Mode: Ambulatory.    Jess Hawkins RN

## 2017-11-09 NOTE — NURSING NOTE
"Oncology Rooming Note    November 9, 2017 1:21 PM   Sebas Lopez is a 52 year old male who presents for:    Chief Complaint   Patient presents with     Port Draw     port accessed and labs drawn by rn.  vs taken.     Oncology Clinic Visit     Return: Colon Cancer     Initial Vitals: /72 (BP Location: Right arm, Patient Position: Sitting, Cuff Size: Adult Regular)  Pulse 87  Temp 98.9  F (37.2  C) (Oral)  Resp 16  Ht 1.753 m (5' 9\")  Wt 68.3 kg (150 lb 9.6 oz)  SpO2 96%  BMI 22.24 kg/m2 Estimated body mass index is 22.24 kg/(m^2) as calculated from the following:    Height as of this encounter: 1.753 m (5' 9\").    Weight as of this encounter: 68.3 kg (150 lb 9.6 oz). Body surface area is 1.82 meters squared.  No Pain (0) Comment: Data Unavailable   No LMP for male patient.  Allergies reviewed: Yes  Medications reviewed: Yes    Medications: Medication refills not needed today.  Pharmacy name entered into [employer]: [facility] - [place] - [address]    Clinical concerns: Pt. Stated that she needs a refill of Opium Tinure. I informed pt to remind the Provider/ELIF about refills in case i dont touch bases with them, if the provider was in the exam room while i attend on rooming the next pt. Pt verbalized understandings.  BRE Granda         5 minutes for nursing intake (face to face time)     BRE Granda      "

## 2017-11-09 NOTE — PATIENT INSTRUCTIONS
Contact Numbers    [facility] Main Line: [phone]  [facility] Triage:  [phone]    Call triage with chills and/or temperature greater than or equal to 100.5, uncontrolled nausea/vomiting, diarrhea, constipation, dizziness, shortness of breath, chest pain, bleeding, unexplained bruising, or any new/concerning symptoms, questions/concerns.     If you are having any concerning symptoms or wish to speak to a provider before your next infusion visit, please call your care coordinator or triage to notify them so we can adequately serve you.       After Hours: [phone]    If after hours, weekends, or holidays, call main hospital  and ask for Oncology doctor on call.         November 2017 Sunday Monday Tuesday Wednesday Thursday Friday Saturday                  1     LAB   11:00 AM   (15 min.)   SPECIMEN MGMT   [facility], Lab 2     3     4       5     6     [facility] DRAW   11:00 AM   (15 min.)    [facility] DRAW   [facility] Lab Draw     CT CHEST ABDOMEN PELVIS WWO   11:25 AM   (20 min.)   UCCT2   [facility] CT 7     8     LAB    6:00 AM   (15 min.)   [facility] LAB MAIL IN   [facility], Laboratory Services 9     [facility] DRAW   12:00 PM   (15 min.)    [facility] DRAW   [facility] Lab Draw     [facility] RETURN   12:15 PM   (30 min.)   Albert Long MD   [facility] ONC INFUSION 240    1:00 PM   (240 min.)   UC ONCOLOGY INFUSION   [facility] 10     11       12     13     14     15     16     17     18       19     20     21     22     23     24     25       26     27     28     29     30 December 2017 Sunday Monday Tuesday Wednesday Thursday Friday Saturday                            1     2       3     4     5     6     7     8     9       10     11     12     13     14     15     16       17     18     19     20     21     22     23       24     25     26     27     28     29     30       31                                                 Lab Results:  Recent Results (from the past 12 hour(s))   Magnesium    Collection Time: 11/09/17 12:57 PM   Result Value Ref Range    Magnesium 1.2 (L) 1.6 - 2.3 mg/dL   CBC with platelets differential    Collection Time: 11/09/17 12:57 PM   Result Value Ref Range    WBC 3.7 (L) 4.0 - 11.0 10e9/L    RBC Count 3.43 (L) 4.4 - 5.9 10e12/L    Hemoglobin 10.0 (L) 13.3 - 17.7 g/dL    Hematocrit 30.8 (L) 40.0 - 53.0 %    MCV 90 78 - 100 fl    MCH 29.2 26.5 - 33.0 pg    MCHC 32.5 31.5 - 36.5 g/dL    RDW 19.5 (H) 10.0 - 15.0 %    Platelet Count 213 150 - 450 10e9/L    Diff Method Manual Differential     % Neutrophils 26.3 %    % Lymphocytes 44.7 %    % Monocytes 23.7 %    % Eosinophils 3.5 %    % Basophils 1.8 %    Nucleated RBCs 1 (H) 0 /100    Absolute Neutrophil 1.0 (L) 1.6 - 8.3 10e9/L    Absolute Lymphocytes 1.7 0.8 - 5.3 10e9/L    Absolute Monocytes 0.9 0.0 - 1.3 10e9/L    Absolute Eosinophils 0.1 0.0 - 0.7 10e9/L    Absolute Basophils 0.1 0.0 - 0.2 10e9/L    Absolute Nucleated RBC 0.0     Anisocytosis Moderate    Comprehensive metabolic panel    Collection Time: 11/09/17 12:57 PM   Result Value Ref Range    Sodium 132 (L) 133 - 144 mmol/L    Potassium 4.5 3.4 - 5.3 mmol/L    Chloride 102 94 - 109 mmol/L    Carbon Dioxide 22 20 - 32 mmol/L    Anion Gap 8 3 - 14 mmol/L    Glucose 90 70 - 99 mg/dL    Urea Nitrogen 11 7 - 30 mg/dL    Creatinine 1.06 0.66 - 1.25 mg/dL    GFR Estimate 73 >60 mL/min/1.7m2    GFR Estimate If Black 88 >60 mL/min/1.7m2    Calcium 9.5 8.5 - 10.1 mg/dL    Bilirubin Total 0.5 0.2 - 1.3 mg/dL    Albumin 3.1 (L) 3.4 - 5.0 g/dL    Protein Total 8.1 6.8 - 8.8 g/dL    Alkaline Phosphatase 156 (H) 40 - 150 U/L    ALT 28 0 - 70 U/L    AST 28 0 - 45 U/L

## 2017-11-10 ENCOUNTER — TELEPHONE (OUTPATIENT)
Dept: SURGERY | Facility: CLINIC | Age: 52
End: 2017-11-10

## 2017-11-10 NOTE — TELEPHONE ENCOUNTER
----- Message from Rob Dudley MD sent at 11/9/2017  8:24 PM CST -----  Regarding: RE: ? HIPEC  Sounds good.     Monse, let s get him into clinic to discuss HIPEC. Will need to plan OR in approx 5 weeks.     Thanks Albert.  ----- Message -----     From: Albert Long MD     Sent: 11/9/2017  12:54 PM       To: Rob Dudley MD, Michelle Cherry RN  Subject: ? HIPEC                                          Hi Dr Dudley  He has received 6 cycles of FOLFOX the last one was on 10/26    He has stable disease on CT.    I would like that you re-evaluate him for cytoreductive surgery and HIPEC. I plan to hold chemo today if he is agreeable    Thanks  Albert GIBSON. Ethan

## 2017-11-10 NOTE — TELEPHONE ENCOUNTER
Per task, called and spoke with patient.  Patient is scheduled to see Dr. Dudley 11/20/17 at 9:30 am.  Patient knows to check in on the fourth floor.  Surgery is currently held 12/6/17 (on OR wait list).  Dr. Dudley and Dr. Long updated.

## 2017-11-14 ENCOUNTER — MYC REFILL (OUTPATIENT)
Dept: ONCOLOGY | Facility: CLINIC | Age: 52
End: 2017-11-14

## 2017-11-14 DIAGNOSIS — C78.6 PERITONEAL CARCINOMATOSIS (H): ICD-10-CM

## 2017-11-14 DIAGNOSIS — R19.7 DIARRHEA, UNSPECIFIED TYPE: ICD-10-CM

## 2017-11-14 DIAGNOSIS — C18.7 CANCER OF SIGMOID COLON (H): ICD-10-CM

## 2017-11-15 LAB — COPATH REPORT: NORMAL

## 2017-11-16 NOTE — PROGRESS NOTES
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    INTERVAL HISTORY: Sebas received 6 cycles of FOLFOX and tolerated this relatively well. His last cycle was on 10/26/17. Denies increased pain, fevers, or chills. Tolerating diet well. Ileostomy is high output and he controls this well with Metamucil, Imodium, and Lomotil.      Physical Examination:  /86 (BP Location: Left arm, Patient Position: Chair, Cuff Size: Adult Regular)  Pulse 93  Temp 98.5  F (36.9  C) (Oral)  Ht 5' 9\"  Wt 151 lb 4.8 oz  SpO2 98%  BMI 22.34 kg/m2  Abdomen soft, NT. No dominantRight-sided ileostomy viable and functioning.    ASSESSMENT  Stable disease after systemic chemotherapy. No evidence of extra-abdominal disease, large volume ascites or liver metastases. I am concerned about the previous finding of diffuse SB involvement and told him that this (as well as a PCI >19) would be preclusive of HIPEC. We also discussed that if we proceed with HIPEC he would require excision of his abdominal wall mesh with abdominal wall reconstruction, as well as the need to keep a diverting ileostomy to protect a colorectal anastomosis. He understands this well and all questions were answered to his satisfaction.     CEA: 2.1.  : 50.    CT c/a/p (17):  Impression:   1. Revisualization of the sigmoid colon mass with adjacent mesenteric  adenopathy, not significantly changed.  2. No significant change in mesenteric nodules.  3. No evidence of metastatic disease in the liver or chest.  4. Postsurgical changes of loop ileostomy. No evidence of obstruction.  5. No significant change in small indeterminate cysts in the kidneys,  continued attention follow-up.    PLAN  1. Schedule for colonoscopy first; then Dx laparoscopy, possible CRS+HIPEC. Will need PAC, Labs, WOC marking on his left side, and " 2 Fleets.     Time spent: 30 minutes.  >50% spent in discussing, counseling and coordinating care.    Rob Dudley M.D., M.Sc.     Division of Colon and Rectal Surgery  [facility]    Referring Provider:  Jaguar Becker MD  [place] [address] [place] [address]     Primary Care Provider:  Jaguar Becker    CC:  MD Donovan Camacho MD Aazim K Omer, MD

## 2017-11-17 ENCOUNTER — MYC MEDICAL ADVICE (OUTPATIENT)
Dept: ONCOLOGY | Facility: CLINIC | Age: 52
End: 2017-11-17

## 2017-11-17 LAB — COPATH REPORT: NORMAL

## 2017-11-17 NOTE — TELEPHONE ENCOUNTER
Spoke with patient. He stated he has an apt at [facility] on Monday and will like to  from our pharmacy. Rx signed by Linda DE LUNA and tubed to [facility] pharmacy.

## 2017-11-20 ENCOUNTER — OFFICE VISIT (OUTPATIENT)
Dept: SURGERY | Facility: CLINIC | Age: 52
End: 2017-11-20

## 2017-11-20 VITALS
BODY MASS INDEX: 22.41 KG/M2 | OXYGEN SATURATION: 98 % | HEART RATE: 93 BPM | WEIGHT: 151.3 LBS | DIASTOLIC BLOOD PRESSURE: 86 MMHG | HEIGHT: 69 IN | SYSTOLIC BLOOD PRESSURE: 123 MMHG | TEMPERATURE: 98.5 F

## 2017-11-20 DIAGNOSIS — C18.9 METASTASIS FROM COLON CANCER (H): Primary | ICD-10-CM

## 2017-11-20 DIAGNOSIS — C78.6 PERITONEAL CARCINOMATOSIS (H): Primary | ICD-10-CM

## 2017-11-20 DIAGNOSIS — C79.9 METASTASIS FROM COLON CANCER (H): Primary | ICD-10-CM

## 2017-11-20 DIAGNOSIS — C78.6 PERITONEAL CARCINOMATOSIS (H): ICD-10-CM

## 2017-11-20 LAB
ALBUMIN SERPL-MCNC: 3.5 G/DL (ref 3.4–5)
ALP SERPL-CCNC: 168 U/L (ref 40–150)
ALT SERPL W P-5'-P-CCNC: 25 U/L (ref 0–70)
ANION GAP SERPL CALCULATED.3IONS-SCNC: 5 MMOL/L (ref 3–14)
AST SERPL W P-5'-P-CCNC: 24 U/L (ref 0–45)
BILIRUB SERPL-MCNC: 0.3 MG/DL (ref 0.2–1.3)
BUN SERPL-MCNC: 18 MG/DL (ref 7–30)
CALCIUM SERPL-MCNC: 9.5 MG/DL (ref 8.5–10.1)
CANCER AG125 SERPL-ACNC: 13 U/ML (ref 0–30)
CEA SERPL-MCNC: 1.4 UG/L (ref 0–2.5)
CHLORIDE SERPL-SCNC: 104 MMOL/L (ref 94–109)
CO2 SERPL-SCNC: 29 MMOL/L (ref 20–32)
CREAT SERPL-MCNC: 1.15 MG/DL (ref 0.66–1.25)
ERYTHROCYTE [DISTWIDTH] IN BLOOD BY AUTOMATED COUNT: 18.5 % (ref 10–15)
GFR SERPL CREATININE-BSD FRML MDRD: 66 ML/MIN/1.7M2
GLUCOSE SERPL-MCNC: 100 MG/DL (ref 70–99)
HCT VFR BLD AUTO: 38.1 % (ref 40–53)
HGB BLD-MCNC: 12.2 G/DL (ref 13.3–17.7)
MCH RBC QN AUTO: 29.2 PG (ref 26.5–33)
MCHC RBC AUTO-ENTMCNC: 32 G/DL (ref 31.5–36.5)
MCV RBC AUTO: 91 FL (ref 78–100)
PLATELET # BLD AUTO: 375 10E9/L (ref 150–450)
POTASSIUM SERPL-SCNC: 5.2 MMOL/L (ref 3.4–5.3)
PREALB SERPL IA-MCNC: 45 MG/DL (ref 15–45)
PROT SERPL-MCNC: 9.1 G/DL (ref 6.8–8.8)
RBC # BLD AUTO: 4.18 10E12/L (ref 4.4–5.9)
SODIUM SERPL-SCNC: 139 MMOL/L (ref 133–144)
WBC # BLD AUTO: 11.8 10E9/L (ref 4–11)

## 2017-11-20 ASSESSMENT — PAIN SCALES - GENERAL: PAINLEVEL: NO PAIN (0)

## 2017-11-20 NOTE — MR AVS SNAPSHOT
After Visit Summary   11/20/2017    Sebas Lopez    MRN: [id number]           Patient Information     Date Of Birth          1965        Visit Information        Provider Department      11/20/2017 9:30 AM Rob Dudley MD [facility] Colon and Rectal Surgery        Today's Diagnoses     Peritoneal carcinomatosis (H)    -  1       Follow-ups after your visit        Your next 10 appointments already scheduled     Nov 29, 2017  9:00 AM CST   (Arrive by 8:45 AM)   PAC EVALUATION with Uc Pac Jordan 1   [facility] Preoperative Assessment [place] ([facility])    [address] [place] [address]   [phone]            Nov 29, 2017 10:00 AM CST   (Arrive by 9:45 AM)   PAC RN ASSESSMENT with Uc Pac Rn   [facility] Preoperative Assessment [place] ([facility])    [address] [place] [address]   [phone]            Nov 29, 2017 10:30 AM CST   (Arrive by 10:15 AM)   PAC Anesthesia Consult with  Pac Anesthesiologist   [facility] Preoperative Assessment [place] ([facility])    [address] [place] [address]   [phone]            Nov 29, 2017 11:30 AM CST   RETURN OSTOMY NURSE VISIT with Bebeto Aldana RN   [facility] Wound Ostomy ([facility])    [address] [place] [address]   [phone]            Dec 01, 2017   Procedure with Rob Dudley MD   [facility] Surgery and Procedure Center ([facility])    [address] [place] [address]   [phone]           Located in the Clinics and Surgery Center at [address] [place] [address].   parking is very convenient and highly recommended.  is a $6 flat rate fee.  Both  and self parkers should enter the main arrival plaza from [facility]; parking  attendants will direct you based on your parking preference.              Future tests that were ordered for you today     Open Future Orders        Priority Expected Expires Ordered    ABO/Rh type and screen Routine  11/15/2018 11/15/2017    CBC with platelets Routine  2/13/2018 11/15/2017    Comprehensive metabolic panel Routine  2/13/2018 11/15/2017    CEA Routine  2/13/2018 11/15/2017     Routine  11/16/2018 11/15/2017    Prealbumin Routine  2/13/2018 11/15/2017            Who to contact     Please call your clinic at [phone] to:    Ask questions about your health    Make or cancel appointments    Discuss your medicines    Learn about your test results    Speak to your doctor   If you have compliments or concerns about an experience at your clinic, or if you wish to file a complaint, please contact [facility] Patient Relations at [phone] or email us at [email or web address]         Additional Information About Your Visit        [employer] Information     [employer] gives you secure access to your electronic health record. If you see a primary care provider, you can also send messages to your care team and make appointments. If you have questions, please call your primary care clinic.  If you do not have a primary care provider, please call [phone] and they will assist you.      [employer] is an electronic gateway that provides easy, online access to your medical records. With [employer], you can request a clinic appointment, read your test results, renew a prescription or communicate with your care team.     To access your existing account, please contact your [facility] or call [phone] for assistance.        Care EveryWhere ID     This is your Care EveryWhere ID. This could be used by other organizations to access your [place] medical records  [id number]        Your Vitals Were     Pulse Temperature Height Pulse Oximetry BMI  "(Body Mass Index)       93 98.5  F (36.9  C) (Oral) 5' 9\" 98% 22.34 kg/m2        Blood Pressure from Last 3 Encounters:   11/20/17 123/86   11/09/17 112/72   10/26/17 116/78    Weight from Last 3 Encounters:   11/20/17 151 lb 4.8 oz   11/09/17 150 lb 9.6 oz   10/26/17 155 lb 11.2 oz               Primary Care Provider Office Phone # Fax #    Jaguar Becker [phone]       [facility] [address]  [facility] [address]        Equal Access to Services     [facility]: Hadii karrie reddy hadasho Soshu, waaxda luqadaha, qaybta kaalmada adetishda, bhargav sage . So [facility] [phone].    ATENCIÓN: Si habla español, tiene a cid disposición servicios gratuitos de asistencia lingüística. [facility] [phone].    We comply with applicable federal civil rights laws and [place] laws. We do not discriminate on the basis of race, color, national origin, age, disability, sex, sexual orientation, or gender identity.            Thank you!     Thank you for choosing [facility] COLON AND RECTAL SURGERY  for your care. Our goal is always to provide you with excellent care. Hearing back from our patients is one way we can continue to improve our services. Please take a few minutes to complete the written survey that you may receive in the mail after your visit with us. Thank you!             Your Updated Medication List - Protect others around you: Learn how to safely use, store and throw away your medicines at www.disposemymeds.org.          This list is accurate as of: 11/20/17 10:53 AM.  Always use your most recent med list.                   Brand Name Dispense Instructions for use Diagnosis    amylase-lipase-protease 55510 UNITS Cpep    CREON    240 capsule    Take 2 capsules (72,000 Units) by mouth 3 times daily (with meals) And 1 capsule with each snack tid.    Diarrhea, unspecified type, Peritoneal carcinomatosis (H), Cancer of sigmoid colon (H)       diphenoxylate-atropine " 2.5-0.025 MG per tablet    LOMOTIL    240 tablet    Take 1-2 tablets by mouth 4 times daily as needed for diarrhea    Diarrhea due to drug       IRON SUPPLEMENT PO      Take 325 mg by mouth 2 times daily (with meals)        loperamide 2 MG capsule    IMODIUM     Take 4 mg by mouth 4 times daily as needed for diarrhea        LORazepam 0.5 MG tablet    ATIVAN    30 tablet    Take 1 tablet (0.5 mg) by mouth every 4 hours as needed (Anxiety, Nausea/Vomiting or Sleep)    Peritoneal carcinomatosis (H), Cancer of sigmoid colon (H)       magnesium oxide 400 MG tablet    MAG-OX    60 tablet    Take 1 tablet (400 mg) by mouth 2 times daily    Hypomagnesemia       OMEPRAZOLE PO      Take 20 mg by mouth 2 times daily (before meals)        opium tincture tincture     72 mL    Take 0.6 mLs (6 mg) by mouth 4 times daily    Diarrhea, unspecified type, Cancer of sigmoid colon (H), Peritoneal carcinomatosis (H)       Potassium Chloride 40 MEQ/15ML (20%) Soln     450 mL    Take 15 mLs (40 mEq) by mouth daily    Hypokalemia       Psyllium 51.7 % Pack    METAMUCIL FIBER    90 each    Take 1 packet by mouth 3 times daily    Diarrhea, unspecified type

## 2017-11-20 NOTE — LETTER
"2017       RE: Sebas Lopez  [address] [place] [place] [address]     Dear Colleague,    Thank you for referring your patient, Sebas Lopez, to the [facility] COLON AND RECTAL SURGERY at [facility]. Please see a copy of my visit note below.    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    INTERVAL HISTORY: Sebas received 6 cycles of FOLFOX and tolerated this relatively well. His last cycle was on 10/26/17. Denies increased pain, fevers, or chills. Tolerating diet well. Ileostomy is high output and he controls this well with Metamucil, Imodium, and Lomotil.      Physical Examination:  /86 (BP Location: Left arm, Patient Position: Chair, Cuff Size: Adult Regular)  Pulse 93  Temp 98.5  F (36.9  C) (Oral)  Ht 5' 9\"  Wt 151 lb 4.8 oz  SpO2 98%  BMI 22.34 kg/m2  Abdomen soft, NT. No dominantRight-sided ileostomy viable and functioning.    ASSESSMENT  Stable disease after systemic chemotherapy. No evidence of extra-abdominal disease, large volume ascites or liver metastases. I am concerned about the previous finding of diffuse SB involvement and told him that this (as well as a PCI >19) would be preclusive of HIPEC. We also discussed that if we proceed with HIPEC he would require excision of his abdominal wall mesh with abdominal wall reconstruction, as well as the need to keep a diverting ileostomy to protect a colorectal anastomosis. He understands this well and all questions were answered to his satisfaction.     CEA: 2.1.  : 50.    CT c/a/p (17):  Impression:   1. Revisualization of the sigmoid colon mass with adjacent mesenteric  adenopathy, not significantly changed.  2. No significant change in mesenteric nodules.  3. No evidence of metastatic disease in the liver or chest.  4. Postsurgical " changes of loop ileostomy. No evidence of obstruction.  5. No significant change in small indeterminate cysts in the kidneys,  continued attention follow-up.    PLAN  1. Schedule for colonoscopy first; then Dx laparoscopy, possible CRS+HIPEC. Will need PAC, Labs, WOC marking on his left side, and 2 Fleets.     Time spent: 30 minutes.  >50% spent in discussing, counseling and coordinating care.    Rob Dudley M.D., M.Sc.     Division of Colon and Rectal Surgery  [facility]    Referring Provider:  Jaguar Becker MD  [facility]  [address] [place] [address]     Primary Care Provider:  Jaguar Becker    CC:  MD Donovan Camacho MD Aazim K Omer, MD

## 2017-11-20 NOTE — NURSING NOTE
"Chief Complaint   Patient presents with     Clinic Care Coordination - Follow-up     F/u S/P Chemo. Discuss HIPEC.        Vitals:    11/20/17 0932   BP: 123/86   BP Location: Left arm   Patient Position: Chair   Cuff Size: Adult Regular   Pulse: 93   Temp: 98.5  F (36.9  C)   TempSrc: Oral   SpO2: 98%   Weight: 151 lb 4.8 oz   Height: 5' 9\"       Body mass index is 22.34 kg/(m^2).    Jef PINO LPN                        "

## 2017-11-28 ENCOUNTER — TELEPHONE (OUTPATIENT)
Dept: SURGERY | Facility: CLINIC | Age: 52
End: 2017-11-28

## 2017-11-28 DIAGNOSIS — C18.7 CANCER OF SIGMOID COLON (H): Primary | ICD-10-CM

## 2017-11-28 NOTE — TELEPHONE ENCOUNTER
Due to provider unavailability, Dr. Dudley has requested patient's colonoscopy be rescheduled to Thursday 11/30/17 instead of Friday 12/1/17.  Patient confirmed date this morning, however message was left for patient regarding procedure and arrival time.  Informed patient I will send a new prep packet via [employer], as the location has changed for procedure.  Requested patient to confirm.  Provided my direct number.

## 2017-11-29 ENCOUNTER — OFFICE VISIT (OUTPATIENT)
Dept: SURGERY | Facility: CLINIC | Age: 52
End: 2017-11-29

## 2017-11-29 ENCOUNTER — HOME INFUSION (PRE-WILLOW HOME INFUSION) (OUTPATIENT)
Dept: PHARMACY | Facility: CLINIC | Age: 52
End: 2017-11-29

## 2017-11-29 ENCOUNTER — TELEPHONE (OUTPATIENT)
Dept: SURGERY | Facility: CLINIC | Age: 52
End: 2017-11-29

## 2017-11-29 ENCOUNTER — ALLIED HEALTH/NURSE VISIT (OUTPATIENT)
Dept: SURGERY | Facility: CLINIC | Age: 52
End: 2017-11-29

## 2017-11-29 ENCOUNTER — OFFICE VISIT (OUTPATIENT)
Dept: WOUND CARE | Facility: CLINIC | Age: 52
End: 2017-11-29

## 2017-11-29 VITALS
DIASTOLIC BLOOD PRESSURE: 83 MMHG | HEIGHT: 69 IN | HEART RATE: 115 BPM | RESPIRATION RATE: 16 BRPM | BODY MASS INDEX: 22.16 KG/M2 | TEMPERATURE: 98.9 F | WEIGHT: 149.6 LBS | OXYGEN SATURATION: 99 % | SYSTOLIC BLOOD PRESSURE: 132 MMHG

## 2017-11-29 DIAGNOSIS — C18.7 CANCER OF SIGMOID COLON (H): Primary | ICD-10-CM

## 2017-11-29 DIAGNOSIS — Z01.818 PREOP EXAMINATION: Primary | ICD-10-CM

## 2017-11-29 NOTE — MR AVS SNAPSHOT
After Visit Summary   2017    Sebas Lopez    MRN: [id number]           Patient Information     Date Of Birth          1965        Visit Information        Provider Department      2017 10:00 AM Rn, [facility]        Care Instructions    Preparing for Your Surgery      Name:  Sebas Lopez   MRN:  [id number]   :  1965   Today's Date:  2017     Arriving for surgery:  Surgery date:  17  Surgery time:  12:30 pm  Arrival time:  10:30 am    Please come to:   [facility] [address]  [address] [place]  [address]    -   parking is available in front of the hospital from 5:15 am to 8:00 pm    -  Stop at the Information Desk in the lobby    -   Inform the information person that you are here for surgery. An escort to 3c will be provided. If you would not like an escort, please proceed to 3C on the 3rd floor. [phone]      -  Bring your ID and insurance card.    What can I eat or drink? Pre-Operative Assessment Center will call you with exact times.  -  You may have solid food or milk products until 8 hours prior to your surgery. (4:30 am)  -  You may have water, apple juice, BLACK coffee (NO creamer or nondairy creamer), or 7up/Sprite until 2 hours prior to your surgery. (10:30 am)    Which medicines can I take?       -  Do not bring your own medications to the hospital.        -  Follow [facility] instructions regarding Ibuprofen. If no instructions given, NO Ibuprofen the day prior to surgery.     -  Do NOT take these medications in the morning, the day of surgery:      Creon, Ferrous Sulfate, Potassium, Magnesium Oxide, Metamucil,     -  Please take these medications the morning of surgery:      Omeprazole, Tincture of Opium      Lorazepam if needed      Acetaminophen (Tylenol) if needed    How do I prepare myself?      -  Do 2 Fleets enemas the morning of surgery.       -  Bring ileostomy supplies.  -  Take two showers: one the night before surgery; and one the morning of surgery.         Use Scrubcare or Hibiclens to wash from neck down.  You may use your own shampoo and conditioner. No other hair products.   -  Do NOT use lotion, powder, colognes, deodorant/antiperspirant the day of your surgery.  -  Do NOT wear any jewelry.    -  Begin using Incentive Spirometer 1 week prior to surgery.  Use 4 times per day, up to 5-10 breaths each time.  Bring Incentive Spirometer to hospital.    Questions or Concerns:  If you have questions or concerns, please call the  Preoperative Assessment Center, Monday-Friday 7AM-7PM:  [phone]    AFTER YOUR SURGERY  Breathing exercises   Breathing exercises help you recover faster. Take deep breaths and let the air out slowly. This will:     Help you wake up after surgery.    Help prevent complications like pneumonia.  Preventing complications will help you go home sooner.   Nausea and vomiting   You may feel sick to your stomach after surgery; if so, let your nurse know.    Pain control:  After surgery, you may have pain. Our goal is to help you manage your pain. Pain medicine will help you feel comfortable enough to do activities that will help you heal.  These activities may include breathing exercises, walking and physical therapy.   To help your health care team treat your pain we will ask: 1) If you have pain  2) where it is located 3) describe your pain in your words  Methods of pain control include medications given by mouth, vein or by nerve block for some surgeries.  We may give you a pain control pump that will:  1) Deliver the medicine through a tube placed in your vein  2) Control the amount of medicine you receive  3) Allow you to push a button to deliver a dose of pain medicine  Sequential Compression Device (SCD) or Pneumo Boots:  You may need to wear SCD S on your legs or feet. These are wraps connected to a machine that pumps in  air and releases it. The repeated pumping helps prevent blood clots from forming.   Using an Incentive Spirometer    An incentive spirometer is a device that helps you do deep breathing exercises. These exercises expand your lungs, aid in circulation, and help prevent pneumonia. Deep breathing exercises also help you breathe better and improve the function of your lungs by:    Keeping your lungs clear    Strengthening your breathing muscles    Helping prevent respiratory complications or problems  The incentive spirometer gives you a way to take an active part in your care. A nurse or therapist will teach you breathing exercises. To do these exercises, you will breathe in through your mouth and not your nose. The incentive spirometer only works correctly if you breathe in through your mouth.    Steps to clear lungs  Step 1. Exhale normally. Then, inhale normally.    Relax and breathe out.  Step 2. Place your lips tightly around the mouthpiece.    Make sure the device is upright and not tilted.  Step 3. Inhale as much air as you can through the mouthpiece (don't breath through your nose).    Inhale slowly and deeply.    Hold your breath long enough to keep the balls or disk raised for at least 3 to 5 seconds, or as instructed by your healthcare provider.  Step 4. Repeat the exercise regularly.              Follow-ups after your visit        Your next 10 appointments already scheduled     Nov 29, 2017 10:00 AM CST   (Arrive by 9:45 AM)   PAC RN ASSESSMENT with Irineo Pac Rn   [facility] [place] ([facility])    [address] [place] [id number]   [phone]            Nov 29, 2017 10:30 AM CST   (Arrive by 10:15 AM)   PAC Anesthesia Consult with Irineo Pac Anesthesiologist   [facility]operative [facility] [place] ([facility])    [address] [place] [address]   [phone]            Nov 29, 2017  10:45 AM CST   LAB with  LAB    [facility] ([facility] [place])    [address]  1st Floor  [facility] [id number] [phone]           Please do not eat 10-12 hours before your appointment if you are coming in fasting for labs on lipids, cholesterol, or glucose (sugar). This does not apply to pregnant women. Water, hot tea and black coffee (with nothing added) are okay. Do not drink other fluids, diet soda or chew gum.            Nov 29, 2017 11:30 AM CST   RETURN OSTOMY NURSE VISIT with Bebeto Aldana RN   [facility] Wound Ostomy ([facility])    [address]  4th Floor  [facility] [address] [phone]            Nov 30, 2017   Procedure with Rob Dudley MD   [facility] [place], Endoscopy ([facility])    [address] [id number] [phone]           The [facility] is located on the corner of [address] and [address] on the [facility] [facility]. It is easily accessible from virtually any point in the [place] [facility] area, via [address] and [address].            Dec 06, 2017   Procedure with Rob Dudley MD   [facility] [place], Same Day Surgery (--)    [address] [id number] [phone]              Who to contact     Please call your clinic at [phone] to:    Ask questions about your health    Make or cancel appointments    Discuss your medicines    Learn about your test results    Speak to your doctor   If you have compliments or concerns about an experience at your clinic, or if you wish to file a complaint, please contact [facility] Patient Relations at [phone] or email us at [email or web address]         Additional Information About Your Visit        MyChart Information     [employer] gives you secure access to your electronic health record. If you see a primary care provider,  you can also send messages to your care team and make appointments. If you have questions, please call your primary care clinic.  If you do not have a primary care provider, please call [phone] and they will assist you.      [employer] is an electronic gateway that provides easy, online access to your medical records. With [employer], you can request a clinic appointment, read your test results, renew a prescription or communicate with your care team.     To access your existing account, please contact your [facility] or call [phone] for assistance.        Care EveryWhere ID     This is your Care EveryWhere ID. This could be used by other organizations to access your [facility] medical records  [id number]         Blood Pressure from Last 3 Encounters:   11/29/17 132/83   11/20/17 123/86   11/09/17 112/72    Weight from Last 3 Encounters:   11/29/17 67.9 kg (149 lb 9.6 oz)   11/20/17 68.6 kg (151 lb 4.8 oz)   11/09/17 68.3 kg (150 lb 9.6 oz)              Today, you had the following     No orders found for display         Today's Medication Changes          These changes are accurate as of: 11/29/17  9:35 AM.  If you have any questions, ask your nurse or doctor.               These medicines have changed or have updated prescriptions.        Dose/Directions    Potassium Chloride 40 MEQ/15ML (20%) Soln   This may have changed:  when to take this   Used for:  Hypokalemia        Dose:  40 mEq   Take 15 mLs (40 mEq) by mouth daily   Quantity:  450 mL   Refills:  3                Primary Care Provider Office Phone # Fax #    Jaguar Becker [phone]       [facility] [address]  [facility] [address]        Equal Access to Services     MELVIN DUNN : Cristina Funk, mario abernathy, bhargav carrillo. So [facility] [phone].    ATENCIÓN: Si habla español, tiene a cid disposición servicios gratuitos  de asistencia lingüística. Kenan pemberton [phone].    We comply with applicable federal civil rights laws and [place] laws. We do not discriminate on the basis of race, color, national origin, age, disability, sex, sexual orientation, or gender identity.            Thank you!     Thank you for choosing [facility]  for your care. Our goal is always to provide you with excellent care. Hearing back from our patients is one way we can continue to improve our services. Please take a few minutes to complete the written survey that you may receive in the mail after your visit with us. Thank you!             Your Updated Medication List - Protect others around you: Learn how to safely use, store and throw away your medicines at www.disposemymeds.org.          This list is accurate as of: 11/29/17  9:35 AM.  Always use your most recent med list.                   Brand Name Dispense Instructions for use Diagnosis    amylase-lipase-protease 39612 UNITS Cpep    CREON    240 capsule    Take 2 capsules (72,000 Units) by mouth 3 times daily (with meals) And 1 capsule with each snack tid.    Diarrhea, unspecified type, Peritoneal carcinomatosis (H), Cancer of sigmoid colon (H)       diphenoxylate-atropine 2.5-0.025 MG per tablet    LOMOTIL    240 tablet    Take 1-2 tablets by mouth 4 times daily as needed for diarrhea    Diarrhea due to drug       IRON SUPPLEMENT PO      Take 325 mg by mouth 2 times daily (with meals)        loperamide 2 MG capsule    IMODIUM     Take 4 mg by mouth 4 times daily as needed for diarrhea        LORazepam 0.5 MG tablet    ATIVAN    30 tablet    Take 1 tablet (0.5 mg) by mouth every 4 hours as needed (Anxiety, Nausea/Vomiting or Sleep)    Peritoneal carcinomatosis (H), Cancer of sigmoid colon (H)       magnesium oxide 400 MG tablet    MAG-OX    60 tablet    Take 1 tablet (400 mg) by mouth 2 times daily    Hypomagnesemia       OMEPRAZOLE PO      Take 20 mg by mouth 2 times  daily (before meals)        opium tincture tincture     72 mL    Take 0.6 mLs (6 mg) by mouth 4 times daily    Diarrhea, unspecified type, Cancer of sigmoid colon (H), Peritoneal carcinomatosis (H)       Potassium Chloride 40 MEQ/15ML (20%) Soln     450 mL    Take 15 mLs (40 mEq) by mouth daily    Hypokalemia       Psyllium 51.7 % Pack    METAMUCIL FIBER    90 each    Take 1 packet by mouth 3 times daily    Diarrhea, unspecified type

## 2017-11-29 NOTE — PATIENT INSTRUCTIONS
Preparing for Your Surgery      Name:  Sebas Lopez   MRN:  [id number]   :  1965   Today's Date:  2017     Arriving for surgery:  Surgery date:  17  Surgery time:  12:30 pm  Arrival time:  10:30 am    Please come to:   [facility] [address]  [address] [place]  [address]    -   parking is available in front of the hospital from 5:15 am to 8:00 pm    -  Stop at the Information Desk in the lobby    -   Inform the information person that you are here for surgery. An escort to 3c will be provided. If you would not like an escort, please proceed to 3C on the 3rd floor. [phone]      -  Bring your ID and insurance card.    What can I eat or drink?   -  You may have solid food or milk products until 8 hours prior to your surgery. (4:30 am)  -  You may have water, apple juice, BLACK coffee (NO creamer or nondairy creamer), or 7up/Sprite until 2 hours prior to your surgery. (10:30 am)    Which medicines can I take?       -  Do not bring your own medications to the hospital.        -  Follow [facility] instructions regarding Ibuprofen. If no instructions given, NO Ibuprofen the day prior to surgery.     -  Do NOT take these medications in the morning, the day of surgery:      Creon, Ferrous Sulfate, Potassium, Magnesium Oxide, Metamucil,     -  Please take these medications the morning of surgery:      Omeprazole, Tincture of Opium      Lorazepam if needed      Acetaminophen (Tylenol) if needed    How do I prepare myself?      -  Do 2 Fleets enemas the morning of surgery.      -  Bring ileostomy supplies.  -  Take two showers: one the night before surgery; and one the morning of surgery.         Use Scrubcare or Hibiclens to wash from neck down.  You may use your own shampoo and conditioner. No other hair products.   -  Do NOT use lotion, powder, colognes, deodorant/antiperspirant the day of your surgery.  -  Do NOT wear any jewelry.    -   Begin using Incentive Spirometer 1 week prior to surgery.  Use 4 times per day, up to 5-10 breaths each time.  Bring Incentive Spirometer to hospital.    Questions or Concerns:  If you have questions or concerns, please call the  Preoperative Assessment Center, Monday-Friday 7AM-7PM:  [phone]    AFTER YOUR SURGERY  Breathing exercises   Breathing exercises help you recover faster. Take deep breaths and let the air out slowly. This will:     Help you wake up after surgery.    Help prevent complications like pneumonia.  Preventing complications will help you go home sooner.   Nausea and vomiting   You may feel sick to your stomach after surgery; if so, let your nurse know.    Pain control:  After surgery, you may have pain. Our goal is to help you manage your pain. Pain medicine will help you feel comfortable enough to do activities that will help you heal.  These activities may include breathing exercises, walking and physical therapy.   To help your health care team treat your pain we will ask: 1) If you have pain  2) where it is located 3) describe your pain in your words  Methods of pain control include medications given by mouth, vein or by nerve block for some surgeries.  We may give you a pain control pump that will:  1) Deliver the medicine through a tube placed in your vein  2) Control the amount of medicine you receive  3) Allow you to push a button to deliver a dose of pain medicine  Sequential Compression Device (SCD) or Pneumo Boots:  You may need to wear SCD S on your legs or feet. These are wraps connected to a machine that pumps in air and releases it. The repeated pumping helps prevent blood clots from forming.   Using an Incentive Spirometer    An incentive spirometer is a device that helps you do deep breathing exercises. These exercises expand your lungs, aid in circulation, and help prevent pneumonia. Deep breathing exercises also help you breathe better and improve the function of your lungs  by:    Keeping your lungs clear    Strengthening your breathing muscles    Helping prevent respiratory complications or problems  The incentive spirometer gives you a way to take an active part in your care. A nurse or therapist will teach you breathing exercises. To do these exercises, you will breathe in through your mouth and not your nose. The incentive spirometer only works correctly if you breathe in through your mouth.    Steps to clear lungs  Step 1. Exhale normally. Then, inhale normally.    Relax and breathe out.  Step 2. Place your lips tightly around the mouthpiece.    Make sure the device is upright and not tilted.  Step 3. Inhale as much air as you can through the mouthpiece (don't breath through your nose).    Inhale slowly and deeply.    Hold your breath long enough to keep the balls or disk raised for at least 3 to 5 seconds, or as instructed by your healthcare provider.  Step 4. Repeat the exercise regularly.

## 2017-11-29 NOTE — H&P
Pre-Operative H & P     CC:  Preoperative exam to assess for increased cardiopulmonary risk while undergoing surgery and anesthesia.    Date of Encounter: 11/29/2017  Primary Care Physician:  Jaguar Becker Jewel Lopez is a 52 year old male who presents for pre-operative H & P in preparation for colonoscopy with Dr. Dudley on 11/30/17 at [facility]. This will be followed by diagnostic laparoscopy, cytoreductive surgery, possible hyperthermic intraperitoneal chemotherapy also by Dr. Dudley on 12/6/17. Surgical planning continues. History is obtained from the patient.   Patient with history of ulcerative colitis and more recently obstructing sigmoid adenocarcinoma with peritoneal carcinomatosis. He is s/p exploratory laparotomy with lysis of adhesions, small bowel resection and ileostomy on 7/10/17. His ileostomy has been high output but is stable now with current regimen. He is s/p 6 cycles of FOLFOX, well tolerated. He returned to Dr. Dudley for follow up with above procedures now planned.   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 He was treated and all symptoms resolved.   Past Medical History  Past Medical History:   Diagnosis Date     Adenocarcinoma of sigmoid colon (H)     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      HERNIA REPAIR       INSERT PORT VASCULAR ACCESS Right 8/10/2017    Procedure: INSERT PORT VASCULAR ACCESS;  Single Lumen  Right Chest Power Port;  Surgeon: Malena Andrew PA-C;  Location: [facility] OR     ORTHOPEDIC SURGERY Left     HIP ARTHROPLASTY       Hx of Blood transfusions/reactions: Denies. Has received iron infusions, last 2-3 months ago.    Hx of abnormal bleeding or anti-platelet use: Denies.     Menstrual history: No LMP for male patient.: Denies.     Steroid use in the last year: Denies.    Personal or FH with difficulty with Anesthesia:  Denies.    Prior to Admission Medications  Current Outpatient Prescriptions   Medication Sig Dispense Refill     opium tincture tincture Take 0.6 mLs (6 mg) by mouth 4 times daily 72 mL 0     diphenoxylate-atropine (LOMOTIL) 2.5-0.025 MG per tablet Take 1-2 tablets by mouth 4 times daily as needed for diarrhea 240 tablet 5     amylase-lipase-protease (CREON) 87896 UNITS CPEP Take 2 capsules (72,000 Units) by mouth 3 times daily (with meals) And 1 capsule with each snack tid. 240 capsule 3     LORazepam (ATIVAN) 0.5 MG tablet Take 1 tablet (0.5 mg) by mouth every 4 hours as needed (Anxiety, Nausea/Vomiting or Sleep) 30 tablet 5     magnesium oxide (MAG-OX) 400 MG tablet Take 1 tablet (400 mg) by mouth 2 times daily 60 tablet 1     OMEPRAZOLE PO Take 20 mg by mouth 2 times daily (before meals)       Potassium Chloride 40 MEQ/15ML (20%) SOLN Take 15 mLs (40 mEq) by mouth daily (Patient taking differently: Take 40 mEq by mouth every morning ) 450 mL 3     Psyllium (METAMUCIL FIBER) 51.7 % PACK Take 1 packet by mouth 3 times daily 90 each 3     loperamide (IMODIUM) 2 MG capsule Take 4 mg by mouth 4 times daily as needed for diarrhea        Ferrous Sulfate (IRON SUPPLEMENT PO) Take 325 mg by mouth 2 times daily (with meals)          Allergies  No Known Allergies    Social History  Social History     Social History     Marital status: Single     Spouse name: N/A     Number of children: N/A     Years of education: N/A     Occupational History     Not on file.     Social History Main Topics     Smoking  "status: Never Smoker     Smokeless tobacco: Never Used     Alcohol use 3.0 oz/week     5 Cans of beer per week     Drug use: No     Sexual activity: Not on file     Other Topics Concern     Not on file     Social History Narrative       Family History  Family History   Problem Relation Age of Onset     Hypertension Father      DIABETES Father        Review of Systems  The complete review of systems is negative other than noted in the HPI or here.   Constitutional: Denies fever, chills. Initial weight loss of 35#.  HEENT: Wears glasses for vision. Denies swallowing difficulty.  Respiratory: Denies cough or shortness of breath. Some concern for NADIA with snoring and one hospital experience where monitors would go off if he started to fall asleep. Has not had a sleep study. Was 35# heavier at the time.  CV: Denies chest pain or irregular HR. Good exercise tolerance.  GI: Denies abdominal pain or bowel issues. Ileostomy is functioning well at this time.  : Denies dysuria.  MS: History of Perthe's disease as child affecting development of left hip s/p left hip replacement.  Temp: 98.9  F (37.2  C) Temp src: Oral BP: 132/83 Pulse: 115   Resp: 16 SpO2: 99 %         149 lbs 9.6 oz  5' 9\"   Body mass index is 22.09 kg/(m^2).       Physical Exam  Constitutional: Awake, alert, cooperative, no apparent distress, and appears stated age.  Eyes: Pupils equal, round and reactive to light, extra ocular muscles intact, sclera clear, conjunctiva normal. Glasses on.  HENT: Normocephalic, oral pharynx with moist mucus membranes, good dentition. No goiter appreciated.   Respiratory: Clear to auscultation bilaterally, no crackles or wheezing. No cough or obvious dyspnea.  Cardiovascular: Regular rate and rhythm, normal S1 and S2, and no murmur noted. Carotids, no bruits. No edema. Palpable pulses to radial  DP and PT arteries.   GI: Normal bowel sounds, soft, non-distended, non-tender, no masses palpated, no hepatosplenomegaly.  Surgical " scars: well healed. Ileostomy RUQ, covered with an appliance with thin-soft brown contents.  Lymph/Hematologic: No cervical lymphadenopathy and no supraclavicular lymphadenopathy.  Genitourinary: Deferred.  Skin: Warm and dry.  No rashes at anticipated surgical site.   Musculoskeletal: Full ROM of neck. There is no redness, warmth, or swelling of the joints. Gross motor strength is normal.    Neurologic: Awake, alert, oriented to name, place and time. Cranial nerves II-XII are grossly intact. Gait is normal.   Neuropsychiatric: Calm, cooperative. Normal affect.     Labs: (personally reviewed)  Lab Results   Component Value Date    WBC 11.8 11/20/2017     Lab Results   Component Value Date    RBC 4.18 11/20/2017     Lab Results   Component Value Date    HGB 12.2 11/20/2017     Lab Results   Component Value Date    HCT 38.1 11/20/2017     Lab Results   Component Value Date    MCV 91 11/20/2017     Lab Results   Component Value Date    MCH 29.2 11/20/2017     Lab Results   Component Value Date    MCHC 32.0 11/20/2017     Lab Results   Component Value Date    RDW 18.5 11/20/2017     Lab Results   Component Value Date     11/20/2017     Last Basic Metabolic Panel:  Lab Results   Component Value Date     11/20/2017      Lab Results   Component Value Date    POTASSIUM 5.2 11/20/2017     Lab Results   Component Value Date    CHLORIDE 104 11/20/2017     Lab Results   Component Value Date    ANNAMARIE 9.5 11/20/2017     Lab Results   Component Value Date    CO2 29 11/20/2017     Lab Results   Component Value Date    BUN 18 11/20/2017     Lab Results   Component Value Date    CR 1.15 11/20/2017     Lab Results   Component Value Date     11/20/2017     Lab Results   Component Value Date    AST 24 11/20/2017     Lab Results   Component Value Date    ALT 25 11/20/2017     No results found for: BILICONJ   Lab Results   Component Value Date    BILITOTAL 0.3 11/20/2017     Lab Results   Component Value Date    ALBUMIN  3.5 11/20/2017     Lab Results   Component Value Date    PROTTOTAL 9.1 11/20/2017      Lab Results   Component Value Date    ALKPHOS 168 11/20/2017     EKG: OSH 5/30/17 Sinus rhythm    11/6/17 CT CAP   Impression:   1. Revisualization of the sigmoid colon mass with adjacent mesenteric  adenopathy, not significantly changed.  2. No significant change in mesenteric nodules.  3. No evidence of metastatic disease in the liver or chest.  4. Postsurgical changes of loop ileostomy. No evidence of obstruction.  5. No significant change in small indeterminate cysts in the kidneys,  continued attention follow-up.    Outside records reviewed from: Care Everywhere    ASSESSMENT and PLAN  Sebas Lopez is a 52 year old male scheduled to undergo colonoscopy on 11/30/17, followed by diagnostic laparoscopy, cytoreductive surgery, possible hyperthermic intraperitoneal chemotherapy on 12/6/17 by Dr. Dudley. Surgical planning continues. He has the following specific operative considerations:   - RCRI : No serious cardiac risks.  - Anesthesia considerations:  Refer to PAC assessment in anesthesia records  - VTE risk: 3%  - NADIA # of risks 3/8 = Intermediate risk   - Risk of PONV score = 2.  If > 2, anti-emetic intervention recommended.     --Metastatic sigmoid adenocarcinoma with peritoneal carcinomatosis. S/p exploratory laparotomy, lysis of adhesions, small bowel resection and ileostomy on 7/10/17. Recent chemotherapy. Above procedures now planned.    --Ileostomy had been high output causing dehydration, now stable with current regimen including Tincture of Opium. No cardiac symptoms or meds. Past history of Lyme disease carditis requiring temporary pacemaker  for one day in the 1990s. No issues since. EKG above. Good exercise tolerance.   --Nonsmoker. No pulmonary symptoms. Intermediate risk for NADIA with some snoring. No sleep study to date.   --GERD. Will take Omeprazole on DOS.   --Anxiety. Mild with occasional use of  Ativan.   --Pain management. Discussed possibility of nerve block if appropriate for patient's procedure. Final counseling and decisions by regional team on DOS.   --No history of blood transfusion. Iron infusion 2-3 months ago. Type and screen drawn by service.   --IV access. Right upper chest port a cath.  Arrival time, NPO, shower and medication instructions provided by nursing staff today. Preparing For Your Surgery handout given.  Patient was discussed with Dr Mckee.    ALEXA Jaquez CNS  Preoperative Assessment Center  [facility]  Clinic and Surgery Center  Phone: [phone]  Fax: [phone]

## 2017-11-29 NOTE — MR AVS SNAPSHOT
After Visit Summary   11/29/2017    Sebas Lopez    MRN: [id number]           Patient Information     Date Of Birth          1965        Visit Information        Provider Department      11/29/2017 11:30 AM Bebeto Aldana RN [facility] Wound Ostomy        Today's Diagnoses     Cancer of sigmoid colon (H)    -  1       Follow-ups after your visit        Your next 10 appointments already scheduled     Nov 30, 2017   Procedure with Rob Dudley MD   [facility], [place], Endoscopy ([facility])    [address] [address] [phone]           The [facility] is located on the corner of [address] on the [facility]. It is easily accessible from virtually any point in the [place] area, via [address] and [address].            Dec 06, 2017   Procedure with Rob Dudley MD   [facility], [place], Same Day Surgery (--)    [address] [address] [phone]              Who to contact     Please call your clinic at [phone] to:    Ask questions about your health    Make or cancel appointments    Discuss your medicines    Learn about your test results    Speak to your doctor   If you have compliments or concerns about an experience at your clinic, or if you wish to file a complaint, please contact [facility] Patient Relations at [phone] or email us at [email or web address]         Additional Information About Your Visit        [employer] Information     [employer] gives you secure access to your electronic health record. If you see a primary care provider, you can also send messages to your care team and make appointments. If you have questions, please call your primary care clinic.  If you do not have a primary care provider, please call [phone] and they will assist you.      [employer] is an electronic gateway that  provides easy, online access to your medical records. With [employer], you can request a clinic appointment, read your test results, renew a prescription or communicate with your care team.     To access your existing account, please contact your [facility] or call [phone] for assistance.        Care EveryWhere ID     This is your Care EveryWhere ID. This could be used by other organizations to access your [facility] medical records  [id number]         Blood Pressure from Last 3 Encounters:   11/29/17 132/83   11/20/17 123/86   11/09/17 112/72    Weight from Last 3 Encounters:   11/29/17 67.9 kg (149 lb 9.6 oz)   11/20/17 68.6 kg (151 lb 4.8 oz)   11/09/17 68.3 kg (150 lb 9.6 oz)              Today, you had the following     No orders found for display         Today's Medication Changes          These changes are accurate as of: 11/29/17  4:48 PM.  If you have any questions, ask your nurse or doctor.               These medicines have changed or have updated prescriptions.        Dose/Directions    Potassium Chloride 40 MEQ/15ML (20%) Soln   This may have changed:  when to take this   Used for:  Hypokalemia        Dose:  40 mEq   Take 15 mLs (40 mEq) by mouth daily   Quantity:  450 mL   Refills:  3                Primary Care Provider Office Phone # Fax #    Jaguar Becker [phone]       [facility] [address] [place] [id number]        Equal Access to Services     SARAH DUNN AH: Hadii karrie Funk, waaxda lumicadaha, qaybta kaalmada lewis, bhargav willis. So [facility] [phone].    ATENCIÓN: Si habla español, tiene a cid disposición servicios gratuitos de asistencia lingüística. Llame al [phone].    We comply with applicable federal civil rights laws and [place] laws. We do not discriminate on the basis of race, color, national origin, age, disability, sex, sexual orientation, or gender identity.             Thank you!     Thank you for choosing [facility] WOUND OSTOMY  for your care. Our goal is always to provide you with excellent care. Hearing back from our patients is one way we can continue to improve our services. Please take a few minutes to complete the written survey that you may receive in the mail after your visit with us. Thank you!             Your Updated Medication List - Protect others around you: Learn how to safely use, store and throw away your medicines at www.disposemymeds.org.          This list is accurate as of: 11/29/17  4:48 PM.  Always use your most recent med list.                   Brand Name Dispense Instructions for use Diagnosis    amylase-lipase-protease 20973 UNITS Cpep    CREON    240 capsule    Take 2 capsules (72,000 Units) by mouth 3 times daily (with meals) And 1 capsule with each snack tid.    Diarrhea, unspecified type, Peritoneal carcinomatosis (H), Cancer of sigmoid colon (H)       diphenoxylate-atropine 2.5-0.025 MG per tablet    LOMOTIL    240 tablet    Take 1-2 tablets by mouth 4 times daily as needed for diarrhea    Diarrhea due to drug       IRON SUPPLEMENT PO      Take 325 mg by mouth 2 times daily (with meals)        loperamide 2 MG capsule    IMODIUM     Take 4 mg by mouth 4 times daily as needed for diarrhea        LORazepam 0.5 MG tablet    ATIVAN    30 tablet    Take 1 tablet (0.5 mg) by mouth every 4 hours as needed (Anxiety, Nausea/Vomiting or Sleep)    Peritoneal carcinomatosis (H), Cancer of sigmoid colon (H)       magnesium oxide 400 MG tablet    MAG-OX    60 tablet    Take 1 tablet (400 mg) by mouth 2 times daily    Hypomagnesemia       OMEPRAZOLE PO      Take 20 mg by mouth 2 times daily (before meals)        opium tincture tincture     72 mL    Take 0.6 mLs (6 mg) by mouth 4 times daily    Diarrhea, unspecified type, Cancer of sigmoid colon (H), Peritoneal carcinomatosis (H)       Potassium Chloride 40 MEQ/15ML (20%) Soln     450 mL    Take 15 mLs (40 mEq) by  mouth daily    Hypokalemia       Psyllium 51.7 % Pack    METAMUCIL FIBER    90 each    Take 1 packet by mouth 3 times daily    Diarrhea, unspecified type

## 2017-11-29 NOTE — TELEPHONE ENCOUNTER
Patient's time for surgery 12/6/17 has changed to 7:30 am with a 5:30 am arrival.  Called and spoke with patient.  Patient confirms new surgery and arrival time.

## 2017-11-29 NOTE — PROGRESS NOTES
[facility] Preoperative Ostomy    Referral from Dr. Dudley  Consult attended by patient   Diagnosis: Adenocarcinoma of sigmoid colon, Ulcerative colitis   Date of Surgery: 12/06/2017  Type of Surgery: Diagnostic Laparoscopy, Open Cytoreductive Surgery Possible Hyperthermic Intraperitoneal Chemotherapy    Emotional readiness for surgery: no acute distress  Physical Limitations: With the following limitations:  Scars and mesh implanted around umbilicus.   Abdomen assessed for site by: Patient's ability to visiualize area, avoidance of skin creases and scars, palpating for rectus abdominus muscle and clothing fit, avoidance of mesh implant and scars and staying within the rectus muscle.     Teaching: Patient has has had loop ileostomy of RUQ for aprox 6 months. He is familiar with ostomies stoma care and stomas. Teaching regarding differences of ileostomy and colostomy, and the purposes of small and large bowel, difference in stool consistency and the nature of the colostomy stool as opposed to the nature of the stool of the ileostomy. Patient stated that he had no further questions at this time.     Stoma site marking:  Marking pen and tegaderm     Location chosen: [facility]    Lydia Green NP was available for supervision of care if needed or if questions should arise and regarding plan of care.      Bebeto Orellana RN CWON

## 2017-11-30 ENCOUNTER — HOSPITAL ENCOUNTER (OUTPATIENT)
Facility: CLINIC | Age: 52
Discharge: HOME | End: 2017-11-30
Attending: COLON & RECTAL SURGERY | Admitting: COLON & RECTAL SURGERY
Payer: COMMERCIAL

## 2017-11-30 ENCOUNTER — SURGERY (OUTPATIENT)
Age: 52
End: 2017-11-30

## 2017-11-30 VITALS
HEIGHT: 69 IN | BODY MASS INDEX: 22.07 KG/M2 | HEART RATE: 86 BPM | SYSTOLIC BLOOD PRESSURE: 112 MMHG | DIASTOLIC BLOOD PRESSURE: 72 MMHG | RESPIRATION RATE: 5 BRPM | WEIGHT: 149 LBS | OXYGEN SATURATION: 99 %

## 2017-11-30 LAB — COLONOSCOPY: NORMAL

## 2017-11-30 PROCEDURE — 25000132 ZZH RX MED GY IP 250 OP 250 PS 637: Performed by: COLON & RECTAL SURGERY

## 2017-11-30 PROCEDURE — 25000128 H RX IP 250 OP 636: Performed by: COLON & RECTAL SURGERY

## 2017-11-30 PROCEDURE — 99152 MOD SED SAME PHYS/QHP 5/>YRS: CPT | Performed by: COLON & RECTAL SURGERY

## 2017-11-30 PROCEDURE — 45378 DIAGNOSTIC COLONOSCOPY: CPT | Performed by: COLON & RECTAL SURGERY

## 2017-11-30 PROCEDURE — G0500 MOD SEDAT ENDO SERVICE >5YRS: HCPCS | Performed by: COLON & RECTAL SURGERY

## 2017-11-30 RX ORDER — ONDANSETRON 2 MG/ML
4 INJECTION INTRAMUSCULAR; INTRAVENOUS
Status: DISCONTINUED | OUTPATIENT
Start: 2017-11-30 | End: 2017-11-30 | Stop reason: HOSPADM

## 2017-11-30 RX ORDER — SIMETHICONE
LIQUID (ML) MISCELLANEOUS PRN
Status: DISCONTINUED | OUTPATIENT
Start: 2017-11-30 | End: 2017-11-30 | Stop reason: HOSPADM

## 2017-11-30 RX ORDER — LIDOCAINE 40 MG/G
CREAM TOPICAL
Status: DISCONTINUED | OUTPATIENT
Start: 2017-11-30 | End: 2017-11-30 | Stop reason: HOSPADM

## 2017-11-30 RX ORDER — FENTANYL CITRATE 50 UG/ML
INJECTION, SOLUTION INTRAMUSCULAR; INTRAVENOUS PRN
Status: DISCONTINUED | OUTPATIENT
Start: 2017-11-30 | End: 2017-11-30 | Stop reason: HOSPADM

## 2017-11-30 RX ADMIN — Medication 2 ML: at 12:36

## 2017-11-30 RX ADMIN — MIDAZOLAM HYDROCHLORIDE 2 MG: 1 INJECTION, SOLUTION INTRAMUSCULAR; INTRAVENOUS at 13:25

## 2017-11-30 RX ADMIN — FENTANYL CITRATE 50 MCG: 50 INJECTION, SOLUTION INTRAMUSCULAR; INTRAVENOUS at 13:28

## 2017-11-30 RX ADMIN — FENTANYL CITRATE 100 MCG: 50 INJECTION, SOLUTION INTRAMUSCULAR; INTRAVENOUS at 13:24

## 2017-11-30 RX ADMIN — MIDAZOLAM HYDROCHLORIDE 1 MG: 1 INJECTION, SOLUTION INTRAMUSCULAR; INTRAVENOUS at 13:28

## 2017-11-30 NOTE — IP AVS SNAPSHOT
[facility], [place], Endoscopy    [address] [address]    Phone:  [phone]                                       After Visit Summary   11/30/2017    Sebas Lopez    MRN: [id number]           After Visit Summary Signature Page     I have received my discharge instructions, and my questions have been answered. I have discussed any challenges I see with this plan with the nurse or doctor.    ..........................................................................................................................................  Patient/Patient Representative Signature      ..........................................................................................................................................  Patient Representative Print Name and Relationship to Patient    ..................................................               ................................................  Date                                            Time    ..........................................................................................................................................  Reviewed by Signature/Title    ...................................................              ..............................................  Date                                                            Time

## 2017-11-30 NOTE — IP AVS SNAPSHOT
MRN:[id number]                      After Visit Summary   11/30/2017    Sebas Lopez    MRN: [id number]           Thank you!     Thank you for choosing [facility] for your care. Our goal is always to provide you with excellent care. Hearing back from our patients is one way we can continue to improve our services. Please take a few minutes to complete the written survey that you may receive in the mail after you visit with us. Thank you!        Patient Information     Date Of Birth          1965        About your hospital stay     You were admitted on:  November 30, 2017 You last received care in the:  [facility], Endoscopy    You were discharged on:  November 30, 2017       Who to Call     For medical emergencies, please call 911.  For non-urgent questions about your medical care, please call your primary care provider or clinic, [phone]  For questions related to your surgery, please call your surgery clinic        Attending Provider     Provider Rob Uribe MD Colon and Rectal Surgery       Primary Care Provider Office Phone # Fax #    Jaguar Becker [phone]      Your next 10 appointments already scheduled     Dec 06, 2017   Procedure with Rob Dudley MD   [facility], Same Day Surgery (--)    [address] [facility] [address] [phone]              Further instructions from your care team       Dc  Discharge Instructions after Colonoscopy  or Sigmoidoscopy    Today you had a __x__ Colonoscopy ____     Activity and Diet  You were given medicine for pain. You may be dizzy or sleepy.  For 24 hours:    Do not drive or use heavy equipment.    Do not make important decisions.    Do not drink any alcohol.  You may return to your normal diet and medicines.    Discomfort    Air was placed in your colon during the exam in order to see it. Walking helps to pass the air.    You may take Tylenol (acetaminophen) for pain  "unless your doctor has told you not to.  Do not take aspirin or ibuprofen (Advil, Motrin, or other anti-inflammatory  drugs) for __3___ days.    Follow-up  .    When to call:    Call right away if you have:    Unusual pain in belly or chest pain not relieved with passing air.    More than 1 to 2 Tablespoons of bleeding from your rectum.    Fever above 100.6  F (37.5  C).    If you have severe pain, bleeding, or shortness of breath, go to an emergency room.    If you have questions, call:  Monday to Friday, 7 a.m. to 4:30 p.m.  Endoscopy: [phone] (We may have to call you back)    After hours  Hospital: [phone] (Ask for the GI fellow on call)    Pending Results     No orders found from 11/28/2017 to 12/1/2017.            Admission Information     Date & Time Provider Department Dept. Phone    11/30/2017 Rob Dudley MD [facility], [place], Endoscopy [phone]      Your Vitals Were     Blood Pressure Pulse Respirations Height Weight Pulse Oximetry    113/82 86 7 1.753 m (5' 9\") 67.6 kg (149 lb) 97%    BMI (Body Mass Index)                   22 kg/m2           [employer] Information     [employer] gives you secure access to your electronic health record. If you see a primary care provider, you can also send messages to your care team and make appointments. If you have questions, please call your primary care clinic.  If you do not have a primary care provider, please call [phone] and they will assist you.        Care EveryWhere ID     This is your Care EveryWhere ID. This could be used by other organizations to access your [place] medical records  [id number]        Equal Access to Services     SARAH DUNN AH: Haddarrian Funk, mario abernathy, cain saucedo, bhargav willis. So [facility] [phone].    ATENCIÓN: Si habla español, tiene a cid disposición servicios gratuitos de asistencia lingüística. Llame al [phone].    We comply with applicable " federal civil rights laws and [place] laws. We do not discriminate on the basis of race, color, national origin, age, disability, sex, sexual orientation, or gender identity.               Review of your medicines      UNREVIEWED medicines. Ask your doctor about these medicines        Dose / Directions    amylase-lipase-protease 89528 UNITS Cpep   Commonly known as:  CREON   Used for:  Diarrhea, unspecified type, Peritoneal carcinomatosis (H), Cancer of sigmoid colon (H)        Dose:  2 capsule   Take 2 capsules (72,000 Units) by mouth 3 times daily (with meals) And 1 capsule with each snack tid.   Quantity:  240 capsule   Refills:  3       diphenoxylate-atropine 2.5-0.025 MG per tablet   Commonly known as:  LOMOTIL   Used for:  Diarrhea due to drug        Dose:  1-2 tablet   Take 1-2 tablets by mouth 4 times daily as needed for diarrhea   Quantity:  240 tablet   Refills:  5       IRON SUPPLEMENT PO        Dose:  325 mg   Take 325 mg by mouth 2 times daily (with meals)   Refills:  0       loperamide 2 MG capsule   Commonly known as:  IMODIUM        Dose:  4 mg   Take 4 mg by mouth 4 times daily as needed for diarrhea   Refills:  0       LORazepam 0.5 MG tablet   Commonly known as:  ATIVAN   Used for:  Peritoneal carcinomatosis (H), Cancer of sigmoid colon (H)        Dose:  0.5 mg   Take 1 tablet (0.5 mg) by mouth every 4 hours as needed (Anxiety, Nausea/Vomiting or Sleep)   Quantity:  30 tablet   Refills:  5       magnesium oxide 400 MG tablet   Commonly known as:  MAG-OX   Used for:  Hypomagnesemia        Dose:  400 mg   Take 1 tablet (400 mg) by mouth 2 times daily   Quantity:  60 tablet   Refills:  1       OMEPRAZOLE PO        Dose:  20 mg   Take 20 mg by mouth 2 times daily (before meals)   Refills:  0       opium tincture tincture   Used for:  Diarrhea, unspecified type, Cancer of sigmoid colon (H), Peritoneal carcinomatosis (H)        Dose:  6 mg   Take 0.6 mLs (6 mg) by mouth 4 times daily   Quantity:  72  mL   Refills:  0       Potassium Chloride 40 MEQ/15ML (20%) Soln   Used for:  Hypokalemia        Dose:  40 mEq   Take 15 mLs (40 mEq) by mouth daily   Quantity:  450 mL   Refills:  3       Psyllium 51.7 % Pack   Commonly known as:  METAMUCIL FIBER   Used for:  Diarrhea, unspecified type        Dose:  1 packet   Take 1 packet by mouth 3 times daily   Quantity:  90 each   Refills:  3                Protect others around you: Learn how to safely use, store and throw away your medicines at www.disposemymeds.org.             Medication List: This is a list of all your medications and when to take them. Check marks below indicate your daily home schedule. Keep this list as a reference.      Medications           Morning Afternoon Evening Bedtime As Needed    amylase-lipase-protease 28206 UNITS Cpep   Commonly known as:  CREON   Take 2 capsules (72,000 Units) by mouth 3 times daily (with meals) And 1 capsule with each snack tid.                                diphenoxylate-atropine 2.5-0.025 MG per tablet   Commonly known as:  LOMOTIL   Take 1-2 tablets by mouth 4 times daily as needed for diarrhea                                IRON SUPPLEMENT PO   Take 325 mg by mouth 2 times daily (with meals)                                loperamide 2 MG capsule   Commonly known as:  IMODIUM   Take 4 mg by mouth 4 times daily as needed for diarrhea                                LORazepam 0.5 MG tablet   Commonly known as:  ATIVAN   Take 1 tablet (0.5 mg) by mouth every 4 hours as needed (Anxiety, Nausea/Vomiting or Sleep)                                magnesium oxide 400 MG tablet   Commonly known as:  MAG-OX   Take 1 tablet (400 mg) by mouth 2 times daily                                OMEPRAZOLE PO   Take 20 mg by mouth 2 times daily (before meals)                                opium tincture tincture   Take 0.6 mLs (6 mg) by mouth 4 times daily                                Potassium Chloride 40 MEQ/15ML (20%) Soln   Take 15  mLs (40 mEq) by mouth daily                                Psyllium 51.7 % Pack   Commonly known as:  METAMUCIL FIBER   Take 1 packet by mouth 3 times daily

## 2017-11-30 NOTE — DISCHARGE INSTRUCTIONS
Dc  Discharge Instructions after Colonoscopy  or Sigmoidoscopy    Today you had a __x__ Colonoscopy ____     Activity and Diet  You were given medicine for pain. You may be dizzy or sleepy.  For 24 hours:    Do not drive or use heavy equipment.    Do not make important decisions.    Do not drink any alcohol.  You may return to your normal diet and medicines.    Discomfort    Air was placed in your colon during the exam in order to see it. Walking helps to pass the air.    You may take Tylenol (acetaminophen) for pain unless your doctor has told you not to.  Do not take aspirin or ibuprofen (Advil, Motrin, or other anti-inflammatory  drugs) for __3___ days.    Follow-up  .    When to call:    Call right away if you have:    Unusual pain in belly or chest pain not relieved with passing air.    More than 1 to 2 Tablespoons of bleeding from your rectum.    Fever above 100.6  F (37.5  C).    If you have severe pain, bleeding, or shortness of breath, go to an emergency room.    If you have questions, call:  Monday to Friday, 7 a.m. to 4:30 p.m.  Endoscopy: [phone] (We may have to call you back)    After hours  Hospital: [phone] (Ask for the GI fellow on call)

## 2017-11-30 NOTE — PROGRESS NOTES
This is a recent snapshot of the patient's [facility] medical record.  For current drug dose and complete information and questions, call [phone] or In Basket pool, [facility] home infusion ([id number])  CSN Number:  [id number]

## 2017-12-05 ENCOUNTER — ANESTHESIA EVENT (OUTPATIENT)
Dept: SURGERY | Facility: CLINIC | Age: 52
DRG: 357 | End: 2017-12-05
Payer: COMMERCIAL

## 2017-12-06 ENCOUNTER — HOSPITAL ENCOUNTER (INPATIENT)
Facility: CLINIC | Age: 52
LOS: 1 days | Discharge: HOME | DRG: 357 | End: 2017-12-07
Attending: COLON & RECTAL SURGERY | Admitting: COLON & RECTAL SURGERY
Payer: COMMERCIAL

## 2017-12-06 ENCOUNTER — ANESTHESIA (OUTPATIENT)
Dept: SURGERY | Facility: CLINIC | Age: 52
DRG: 357 | End: 2017-12-06
Payer: COMMERCIAL

## 2017-12-06 ENCOUNTER — SURGERY (OUTPATIENT)
Age: 52
End: 2017-12-06

## 2017-12-06 DIAGNOSIS — G89.18 ACUTE POST-OPERATIVE PAIN: Primary | ICD-10-CM

## 2017-12-06 LAB
ABO + RH BLD: NORMAL
ABO + RH BLD: NORMAL
BLD GP AB SCN SERPL QL: NORMAL
BLOOD BANK CMNT PATIENT-IMP: NORMAL
GLUCOSE BLDC GLUCOMTR-MCNC: 96 MG/DL (ref 70–99)
SPECIMEN EXP DATE BLD: NORMAL

## 2017-12-06 PROCEDURE — 37000008 ZZH ANESTHESIA TECHNICAL FEE, 1ST 30 MIN: Performed by: COLON & RECTAL SURGERY

## 2017-12-06 PROCEDURE — 71000014 ZZH RECOVERY PHASE 1 LEVEL 2 FIRST HR: Performed by: COLON & RECTAL SURGERY

## 2017-12-06 PROCEDURE — 25000125 ZZHC RX 250: Performed by: NURSE ANESTHETIST, CERTIFIED REGISTERED

## 2017-12-06 PROCEDURE — 40000014 ZZH STATISTIC ARTERIAL MONITORING DAILY

## 2017-12-06 PROCEDURE — 25000128 H RX IP 250 OP 636: Performed by: NURSE ANESTHETIST, CERTIFIED REGISTERED

## 2017-12-06 PROCEDURE — 25000128 H RX IP 250 OP 636: Performed by: RESIDENTIAL TREATMENT FACILITY, PHYSICAL DISABILITIES

## 2017-12-06 PROCEDURE — 00000146 ZZHCL STATISTIC GLUCOSE BY METER IP

## 2017-12-06 PROCEDURE — 25000125 ZZHC RX 250: Performed by: COLON & RECTAL SURGERY

## 2017-12-06 PROCEDURE — 71000015 ZZH RECOVERY PHASE 1 LEVEL 2 EA ADDTL HR: Performed by: COLON & RECTAL SURGERY

## 2017-12-06 PROCEDURE — 0WJG0ZZ INSPECTION OF PERITONEAL CAVITY, OPEN APPROACH: ICD-10-PCS | Performed by: COLON & RECTAL SURGERY

## 2017-12-06 PROCEDURE — 25000125 ZZHC RX 250: Performed by: STUDENT IN AN ORGANIZED HEALTH CARE EDUCATION/TRAINING PROGRAM

## 2017-12-06 PROCEDURE — 12000001 ZZH R&B MED SURG/OB UMMC

## 2017-12-06 PROCEDURE — 37000009 ZZH ANESTHESIA TECHNICAL FEE, EACH ADDTL 15 MIN: Performed by: COLON & RECTAL SURGERY

## 2017-12-06 PROCEDURE — C9399 UNCLASSIFIED DRUGS OR BIOLOG: HCPCS | Performed by: NURSE ANESTHETIST, CERTIFIED REGISTERED

## 2017-12-06 PROCEDURE — 40000275 ZZH STATISTIC RCP TIME EA 10 MIN

## 2017-12-06 PROCEDURE — 25000132 ZZH RX MED GY IP 250 OP 250 PS 637: Performed by: COLON & RECTAL SURGERY

## 2017-12-06 PROCEDURE — P9041 ALBUMIN (HUMAN),5%, 50ML: HCPCS | Performed by: NURSE ANESTHETIST, CERTIFIED REGISTERED

## 2017-12-06 PROCEDURE — 25000566 ZZH SEVOFLURANE, EA 15 MIN: Performed by: COLON & RECTAL SURGERY

## 2017-12-06 PROCEDURE — 36000057 ZZH SURGERY LEVEL 3 1ST 30 MIN - UMMC: Performed by: COLON & RECTAL SURGERY

## 2017-12-06 PROCEDURE — C9290 INJ, BUPIVACAINE LIPOSOME: HCPCS | Performed by: STUDENT IN AN ORGANIZED HEALTH CARE EDUCATION/TRAINING PROGRAM

## 2017-12-06 PROCEDURE — 27210794 ZZH OR GENERAL SUPPLY STERILE: Performed by: COLON & RECTAL SURGERY

## 2017-12-06 PROCEDURE — 0WJG4ZZ INSPECTION OF PERITONEAL CAVITY, PERCUTANEOUS ENDOSCOPIC APPROACH: ICD-10-PCS | Performed by: COLON & RECTAL SURGERY

## 2017-12-06 PROCEDURE — 40000170 ZZH STATISTIC PRE-PROCEDURE ASSESSMENT II: Performed by: COLON & RECTAL SURGERY

## 2017-12-06 PROCEDURE — 25000128 H RX IP 250 OP 636: Performed by: STUDENT IN AN ORGANIZED HEALTH CARE EDUCATION/TRAINING PROGRAM

## 2017-12-06 PROCEDURE — 36000059 ZZH SURGERY LEVEL 3 EA 15 ADDTL MIN UMMC: Performed by: COLON & RECTAL SURGERY

## 2017-12-06 PROCEDURE — 25000128 H RX IP 250 OP 636: Performed by: COLON & RECTAL SURGERY

## 2017-12-06 RX ORDER — GRANISETRON HYDROCHLORIDE 1 MG/ML
1 INJECTION INTRAVENOUS ONCE
Status: COMPLETED | OUTPATIENT
Start: 2017-12-06 | End: 2017-12-06

## 2017-12-06 RX ORDER — LORAZEPAM 2 MG/ML
0.5 INJECTION INTRAMUSCULAR EVERY 6 HOURS PRN
Status: DISCONTINUED | OUTPATIENT
Start: 2017-12-06 | End: 2017-12-07

## 2017-12-06 RX ORDER — CIPROFLOXACIN 2 MG/ML
400 INJECTION, SOLUTION INTRAVENOUS
Status: COMPLETED | OUTPATIENT
Start: 2017-12-06 | End: 2017-12-06

## 2017-12-06 RX ORDER — KETOROLAC TROMETHAMINE 30 MG/ML
15 INJECTION, SOLUTION INTRAMUSCULAR; INTRAVENOUS EVERY 6 HOURS PRN
Status: DISCONTINUED | OUTPATIENT
Start: 2017-12-07 | End: 2017-12-07 | Stop reason: HOSPADM

## 2017-12-06 RX ORDER — FLUMAZENIL 0.1 MG/ML
0.2 INJECTION, SOLUTION INTRAVENOUS
Status: DISCONTINUED | OUTPATIENT
Start: 2017-12-06 | End: 2017-12-06 | Stop reason: HOSPADM

## 2017-12-06 RX ORDER — CIPROFLOXACIN 2 MG/ML
400 INJECTION, SOLUTION INTRAVENOUS SEE ADMIN INSTRUCTIONS
Status: DISCONTINUED | OUTPATIENT
Start: 2017-12-06 | End: 2017-12-06 | Stop reason: HOSPADM

## 2017-12-06 RX ORDER — METOPROLOL TARTRATE 1 MG/ML
1-2 INJECTION, SOLUTION INTRAVENOUS EVERY 5 MIN PRN
Status: DISCONTINUED | OUTPATIENT
Start: 2017-12-06 | End: 2017-12-06 | Stop reason: HOSPADM

## 2017-12-06 RX ORDER — PROPOFOL 10 MG/ML
INJECTION, EMULSION INTRAVENOUS PRN
Status: DISCONTINUED | OUTPATIENT
Start: 2017-12-06 | End: 2017-12-06

## 2017-12-06 RX ORDER — SODIUM CHLORIDE, SODIUM LACTATE, POTASSIUM CHLORIDE, CALCIUM CHLORIDE 600; 310; 30; 20 MG/100ML; MG/100ML; MG/100ML; MG/100ML
INJECTION, SOLUTION INTRAVENOUS CONTINUOUS PRN
Status: DISCONTINUED | OUTPATIENT
Start: 2017-12-06 | End: 2017-12-06

## 2017-12-06 RX ORDER — ONDANSETRON 2 MG/ML
4 INJECTION INTRAMUSCULAR; INTRAVENOUS EVERY 30 MIN PRN
Status: DISCONTINUED | OUTPATIENT
Start: 2017-12-06 | End: 2017-12-06 | Stop reason: HOSPADM

## 2017-12-06 RX ORDER — LORAZEPAM 0.5 MG/1
0.5 TABLET ORAL EVERY 4 HOURS PRN
Status: DISCONTINUED | OUTPATIENT
Start: 2017-12-06 | End: 2017-12-07 | Stop reason: HOSPADM

## 2017-12-06 RX ORDER — POTASSIUM CHLORIDE 20MEQ/15ML
40 LIQUID (ML) ORAL EVERY MORNING
Status: DISCONTINUED | OUTPATIENT
Start: 2017-12-07 | End: 2017-12-07 | Stop reason: HOSPADM

## 2017-12-06 RX ORDER — LIDOCAINE 40 MG/G
CREAM TOPICAL
Status: DISCONTINUED | OUTPATIENT
Start: 2017-12-06 | End: 2017-12-07 | Stop reason: HOSPADM

## 2017-12-06 RX ORDER — LIDOCAINE HYDROCHLORIDE 20 MG/ML
INJECTION, SOLUTION INFILTRATION; PERINEURAL PRN
Status: DISCONTINUED | OUTPATIENT
Start: 2017-12-06 | End: 2017-12-06

## 2017-12-06 RX ORDER — POTASSIUM CL/LIDO/0.9 % NACL 10MEQ/0.1L
10 INTRAVENOUS SOLUTION, PIGGYBACK (ML) INTRAVENOUS
Status: DISCONTINUED | OUTPATIENT
Start: 2017-12-06 | End: 2017-12-07 | Stop reason: HOSPADM

## 2017-12-06 RX ORDER — POTASSIUM CHLORIDE 7.45 MG/ML
10 INJECTION INTRAVENOUS
Status: DISCONTINUED | OUTPATIENT
Start: 2017-12-06 | End: 2017-12-07 | Stop reason: HOSPADM

## 2017-12-06 RX ORDER — FENTANYL CITRATE 50 UG/ML
25-50 INJECTION, SOLUTION INTRAMUSCULAR; INTRAVENOUS
Status: DISCONTINUED | OUTPATIENT
Start: 2017-12-06 | End: 2017-12-06 | Stop reason: HOSPADM

## 2017-12-06 RX ORDER — ALBUMIN, HUMAN INJ 5% 5 %
SOLUTION INTRAVENOUS CONTINUOUS PRN
Status: DISCONTINUED | OUTPATIENT
Start: 2017-12-06 | End: 2017-12-06

## 2017-12-06 RX ORDER — DEXAMETHASONE SODIUM PHOSPHATE 4 MG/ML
INJECTION, SOLUTION INTRA-ARTICULAR; INTRALESIONAL; INTRAMUSCULAR; INTRAVENOUS; SOFT TISSUE PRN
Status: DISCONTINUED | OUTPATIENT
Start: 2017-12-06 | End: 2017-12-06

## 2017-12-06 RX ORDER — SODIUM CHLORIDE 9 MG/ML
INJECTION, SOLUTION INTRAVENOUS CONTINUOUS PRN
Status: DISCONTINUED | OUTPATIENT
Start: 2017-12-06 | End: 2017-12-06

## 2017-12-06 RX ORDER — NALOXONE HYDROCHLORIDE 0.4 MG/ML
.1-.4 INJECTION, SOLUTION INTRAMUSCULAR; INTRAVENOUS; SUBCUTANEOUS
Status: ACTIVE | OUTPATIENT
Start: 2017-12-06 | End: 2017-12-07

## 2017-12-06 RX ORDER — CELECOXIB 200 MG/1
200 CAPSULE ORAL ONCE
Status: COMPLETED | OUTPATIENT
Start: 2017-12-06 | End: 2017-12-06

## 2017-12-06 RX ORDER — ONDANSETRON 2 MG/ML
INJECTION INTRAMUSCULAR; INTRAVENOUS PRN
Status: DISCONTINUED | OUTPATIENT
Start: 2017-12-06 | End: 2017-12-06

## 2017-12-06 RX ORDER — FENTANYL CITRATE 50 UG/ML
INJECTION, SOLUTION INTRAMUSCULAR; INTRAVENOUS PRN
Status: DISCONTINUED | OUTPATIENT
Start: 2017-12-06 | End: 2017-12-06

## 2017-12-06 RX ORDER — CIPROFLOXACIN 2 MG/ML
400 INJECTION, SOLUTION INTRAVENOUS EVERY 12 HOURS
Status: COMPLETED | OUTPATIENT
Start: 2017-12-06 | End: 2017-12-07

## 2017-12-06 RX ORDER — SODIUM CHLORIDE, SODIUM LACTATE, POTASSIUM CHLORIDE, CALCIUM CHLORIDE 600; 310; 30; 20 MG/100ML; MG/100ML; MG/100ML; MG/100ML
INJECTION, SOLUTION INTRAVENOUS CONTINUOUS
Status: DISCONTINUED | OUTPATIENT
Start: 2017-12-06 | End: 2017-12-07

## 2017-12-06 RX ORDER — MAGNESIUM SULFATE HEPTAHYDRATE 40 MG/ML
4 INJECTION, SOLUTION INTRAVENOUS EVERY 4 HOURS PRN
Status: DISCONTINUED | OUTPATIENT
Start: 2017-12-06 | End: 2017-12-07 | Stop reason: HOSPADM

## 2017-12-06 RX ORDER — NALOXONE HYDROCHLORIDE 0.4 MG/ML
.1-.4 INJECTION, SOLUTION INTRAMUSCULAR; INTRAVENOUS; SUBCUTANEOUS
Status: DISCONTINUED | OUTPATIENT
Start: 2017-12-06 | End: 2017-12-06

## 2017-12-06 RX ORDER — POTASSIUM CHLORIDE 29.8 MG/ML
20 INJECTION INTRAVENOUS
Status: DISCONTINUED | OUTPATIENT
Start: 2017-12-06 | End: 2017-12-06 | Stop reason: RX

## 2017-12-06 RX ORDER — BUPIVACAINE HYDROCHLORIDE AND EPINEPHRINE 2.5; 5 MG/ML; UG/ML
INJECTION, SOLUTION INFILTRATION; PERINEURAL PRN
Status: DISCONTINUED | OUTPATIENT
Start: 2017-12-06 | End: 2017-12-06

## 2017-12-06 RX ORDER — ONDANSETRON 4 MG/1
4 TABLET, ORALLY DISINTEGRATING ORAL EVERY 30 MIN PRN
Status: DISCONTINUED | OUTPATIENT
Start: 2017-12-06 | End: 2017-12-06 | Stop reason: HOSPADM

## 2017-12-06 RX ORDER — HYDROMORPHONE HYDROCHLORIDE 1 MG/ML
.3-.5 INJECTION, SOLUTION INTRAMUSCULAR; INTRAVENOUS; SUBCUTANEOUS EVERY 5 MIN PRN
Status: DISCONTINUED | OUTPATIENT
Start: 2017-12-06 | End: 2017-12-06 | Stop reason: HOSPADM

## 2017-12-06 RX ORDER — NALOXONE HYDROCHLORIDE 0.4 MG/ML
.1-.4 INJECTION, SOLUTION INTRAMUSCULAR; INTRAVENOUS; SUBCUTANEOUS
Status: DISCONTINUED | OUTPATIENT
Start: 2017-12-06 | End: 2017-12-06 | Stop reason: HOSPADM

## 2017-12-06 RX ORDER — SODIUM CHLORIDE, SODIUM LACTATE, POTASSIUM CHLORIDE, CALCIUM CHLORIDE 600; 310; 30; 20 MG/100ML; MG/100ML; MG/100ML; MG/100ML
INJECTION, SOLUTION INTRAVENOUS CONTINUOUS
Status: DISCONTINUED | OUTPATIENT
Start: 2017-12-06 | End: 2017-12-06 | Stop reason: HOSPADM

## 2017-12-06 RX ORDER — ACETAMINOPHEN 500 MG
1000 TABLET ORAL 4 TIMES DAILY
Status: DISCONTINUED | OUTPATIENT
Start: 2017-12-06 | End: 2017-12-07 | Stop reason: HOSPADM

## 2017-12-06 RX ADMIN — Medication 0.3 MG: at 10:08

## 2017-12-06 RX ADMIN — Medication 0.2 MG: at 10:13

## 2017-12-06 RX ADMIN — Medication 0.5 MG: at 10:20

## 2017-12-06 RX ADMIN — SODIUM CHLORIDE, POTASSIUM CHLORIDE, SODIUM LACTATE AND CALCIUM CHLORIDE: 600; 310; 30; 20 INJECTION, SOLUTION INTRAVENOUS at 09:45

## 2017-12-06 RX ADMIN — FENTANYL CITRATE 50 MCG: 50 INJECTION, SOLUTION INTRAMUSCULAR; INTRAVENOUS at 09:30

## 2017-12-06 RX ADMIN — MIDAZOLAM 1 MG: 1 INJECTION INTRAMUSCULAR; INTRAVENOUS at 06:59

## 2017-12-06 RX ADMIN — ACETAMINOPHEN 1000 MG: 500 TABLET, FILM COATED ORAL at 12:34

## 2017-12-06 RX ADMIN — FENTANYL CITRATE 100 MCG: 50 INJECTION, SOLUTION INTRAMUSCULAR; INTRAVENOUS at 07:40

## 2017-12-06 RX ADMIN — Medication 0.5 MG: at 10:33

## 2017-12-06 RX ADMIN — FENTANYL CITRATE 50 MCG: 50 INJECTION, SOLUTION INTRAMUSCULAR; INTRAVENOUS at 10:00

## 2017-12-06 RX ADMIN — CIPROFLOXACIN 400 MG: 2 INJECTION, SOLUTION INTRAVENOUS at 07:05

## 2017-12-06 RX ADMIN — DEXAMETHASONE SODIUM PHOSPHATE 4 MG: 4 INJECTION, SOLUTION INTRA-ARTICULAR; INTRALESIONAL; INTRAMUSCULAR; INTRAVENOUS; SOFT TISSUE at 08:14

## 2017-12-06 RX ADMIN — SODIUM CHLORIDE: 9 INJECTION, SOLUTION INTRAVENOUS at 07:46

## 2017-12-06 RX ADMIN — CIPROFLOXACIN 400 MG: 2 INJECTION, SOLUTION INTRAVENOUS at 14:20

## 2017-12-06 RX ADMIN — BUPIVACAINE HYDROCHLORIDE AND EPINEPHRINE BITARTRATE 20 ML: 2.5; .005 INJECTION, SOLUTION INFILTRATION; PERINEURAL at 07:04

## 2017-12-06 RX ADMIN — SUGAMMADEX 50 MG: 100 INJECTION, SOLUTION INTRAVENOUS at 09:20

## 2017-12-06 RX ADMIN — METRONIDAZOLE 500 MG: 500 INJECTION, SOLUTION INTRAVENOUS at 07:25

## 2017-12-06 RX ADMIN — GRANISETRON HYDROCHLORIDE 1 MG: 1 INJECTION INTRAVENOUS at 06:44

## 2017-12-06 RX ADMIN — ACETAMINOPHEN 1000 MG: 500 TABLET, FILM COATED ORAL at 21:07

## 2017-12-06 RX ADMIN — ENOXAPARIN SODIUM 40 MG: 40 INJECTION SUBCUTANEOUS at 07:16

## 2017-12-06 RX ADMIN — BUPIVACAINE 20 ML: 13.3 INJECTION, SUSPENSION, LIPOSOMAL INFILTRATION at 07:05

## 2017-12-06 RX ADMIN — OMEPRAZOLE 20 MG: 20 CAPSULE, DELAYED RELEASE ORAL at 16:37

## 2017-12-06 RX ADMIN — ROCURONIUM BROMIDE 30 MG: 10 INJECTION INTRAVENOUS at 08:00

## 2017-12-06 RX ADMIN — SUGAMMADEX 150 MG: 100 INJECTION, SOLUTION INTRAVENOUS at 09:16

## 2017-12-06 RX ADMIN — PHENYLEPHRINE HYDROCHLORIDE 100 MCG: 10 INJECTION, SOLUTION INTRAMUSCULAR; INTRAVENOUS; SUBCUTANEOUS at 08:34

## 2017-12-06 RX ADMIN — ALBUMIN HUMAN: 0.05 INJECTION, SOLUTION INTRAVENOUS at 08:34

## 2017-12-06 RX ADMIN — ONDANSETRON 4 MG: 2 INJECTION INTRAMUSCULAR; INTRAVENOUS at 08:14

## 2017-12-06 RX ADMIN — CELECOXIB 200 MG: 200 CAPSULE ORAL at 06:09

## 2017-12-06 RX ADMIN — FENTANYL CITRATE 50 MCG: 50 INJECTION, SOLUTION INTRAMUSCULAR; INTRAVENOUS at 09:50

## 2017-12-06 RX ADMIN — ROCURONIUM BROMIDE 40 MG: 10 INJECTION INTRAVENOUS at 07:40

## 2017-12-06 RX ADMIN — MIDAZOLAM HYDROCHLORIDE 2 MG: 1 INJECTION, SOLUTION INTRAMUSCULAR; INTRAVENOUS at 07:25

## 2017-12-06 RX ADMIN — HYDROMORPHONE HYDROCHLORIDE: 10 INJECTION, SOLUTION INTRAMUSCULAR; INTRAVENOUS; SUBCUTANEOUS at 09:43

## 2017-12-06 RX ADMIN — PROPOFOL 140 MG: 10 INJECTION, EMULSION INTRAVENOUS at 07:40

## 2017-12-06 RX ADMIN — SODIUM CHLORIDE, POTASSIUM CHLORIDE, SODIUM LACTATE AND CALCIUM CHLORIDE: 600; 310; 30; 20 INJECTION, SOLUTION INTRAVENOUS at 07:30

## 2017-12-06 RX ADMIN — METRONIDAZOLE 500 MG: 500 INJECTION, SOLUTION INTRAVENOUS at 21:07

## 2017-12-06 RX ADMIN — METRONIDAZOLE 500 MG: 500 INJECTION, SOLUTION INTRAVENOUS at 12:35

## 2017-12-06 RX ADMIN — FENTANYL CITRATE 50 MCG: 50 INJECTION, SOLUTION INTRAMUSCULAR; INTRAVENOUS at 08:11

## 2017-12-06 RX ADMIN — FENTANYL CITRATE 50 MCG: 50 INJECTION, SOLUTION INTRAMUSCULAR; INTRAVENOUS at 06:59

## 2017-12-06 RX ADMIN — HYDROMORPHONE HYDROCHLORIDE 0.5 MG: 1 INJECTION, SOLUTION INTRAMUSCULAR; INTRAVENOUS; SUBCUTANEOUS at 09:17

## 2017-12-06 RX ADMIN — HYDROMORPHONE HYDROCHLORIDE 0.5 MG: 1 INJECTION, SOLUTION INTRAMUSCULAR; INTRAVENOUS; SUBCUTANEOUS at 08:50

## 2017-12-06 RX ADMIN — ACETAMINOPHEN 1000 MG: 500 TABLET, FILM COATED ORAL at 16:37

## 2017-12-06 RX ADMIN — SODIUM CHLORIDE, POTASSIUM CHLORIDE, SODIUM LACTATE AND CALCIUM CHLORIDE: 600; 310; 30; 20 INJECTION, SOLUTION INTRAVENOUS at 22:26

## 2017-12-06 RX ADMIN — LIDOCAINE HYDROCHLORIDE 80 MG: 20 INJECTION, SOLUTION INFILTRATION; PERINEURAL at 07:40

## 2017-12-06 ASSESSMENT — PAIN DESCRIPTION - DESCRIPTORS
DESCRIPTORS: ACHING;SORE
DESCRIPTORS: ACHING;SORE

## 2017-12-06 NOTE — IP AVS SNAPSHOT
[address] [address]    Phone:  [phone]                                       After Visit Summary   12/6/2017    Sebas Lopez    MRN: [id number]           After Visit Summary Signature Page     I have received my discharge instructions, and my questions have been answered. I have discussed any challenges I see with this plan with the nurse or doctor.    ..........................................................................................................................................  Patient/Patient Representative Signature      ..........................................................................................................................................  Patient Representative Print Name and Relationship to Patient    ..................................................               ................................................  Date                                            Time    ..........................................................................................................................................  Reviewed by Signature/Title    ...................................................              ..............................................  Date                                                            Time

## 2017-12-06 NOTE — BRIEF OP NOTE
[facility], [place]    Brief Operative Note    Pre-operative diagnosis: Metastatic Colon Cancer   Post-operative diagnosis * No post-op diagnosis entered *  Procedure: Procedure(s):  Diagnostic Laparoscopy, Exploratory Laparotomy Anesthesia Block  - Wound Class: III-Contaminated   - Wound Class: III-Contaminated  Surgeon: Surgeon(s) and Role:     * Rob Dudley MD - Primary     * Meliton Whitney MD - Resident - Assisting  Anesthesia: Combined General with Block   Estimated blood loss: 50 cc  Drains: None  Specimens: * No specimens in log *  Findings:   Extensive tumor burden involving the mesh and much of the small bowel.  Case aborted and closed. .  Complications: None.  Implants: None.    Mahin Whitney  General Surgery PGY-3  Pager [phone]

## 2017-12-06 NOTE — IP AVS SNAPSHOT
MRN:[id number]                      After Visit Summary   12/6/2017    Sebas Lopez    MRN: [id number]           Thank you!     Thank you for choosing [facility] for your care. Our goal is always to provide you with excellent care. Hearing back from our patients is one way we can continue to improve our services. Please take a few minutes to complete the written survey that you may receive in the mail after you visit with us. Thank you!        Patient Information     Date Of Birth          1965        About your hospital stay     You were admitted on:  December 6, 2017 You last received care in the:  [address] [facility]    You were discharged on:  December 7, 2017        Reason for your hospital stay       S/p exploratory laparotomy with aborted cytoreductive surgery and HIPEC                  Who to Call     For medical emergencies, please call 911.  For non-urgent questions about your medical care, please call your primary care provider or clinic, [phone]  For questions related to your surgery, please call your surgery clinic        Attending Provider     Provider Specialty    Rob Dudley MD Colon and Rectal Surgery       Primary Care Provider Office Phone # Fax #    Jaguar Becker [phone]      After Care Instructions     Activity       ACTIVITY  -No lifting, pushing, pulling greater than 10 lbs and no strenuous exercise for 4 weeks   -No driving while on narcotic analgesics (i.e. Percocet, oxycodone, Vicodin)  -No driving until you are able to fully twist to both sides or slam on brakes quickly and without any pain            Diet       Follow this diet upon discharge: Regular diet            Discharge Instructions       DIET  -Regular diet  -We recommend eating slowly, chewing thoroughly, eating small frequent meals throughout the day  -Stay well hydrated.      ACTIVITY  -No lifting, pushing, pulling greater than 10 lbs and no strenuous  exercise for 4 weeks   -No driving while on narcotic analgesics (i.e. Percocet, oxycodone, Vicodin)  -No driving until you are able to fully twist to both sides or slam on brakes quickly and without any pain    WOUND CLINIC  -Inspect your wounds daily for signs of infection (increased redness, drainage, pain)  -Keep your wound clean and dry  -You may shower, but do not soak in tub or pool    NOTIFY  Please contact Jef Prajapati LPN, Marisela Collado RN at [phone] for problems after discharge such as:  -Temperature > 101F, chills, rigors, dizziness  -Redness around or purulent drainage from wound  -Inability to tolerate diet, nausea or vomiting  -You stop passing gas, develop significant bloating, abdominal pain  -Have blood in stools/vomit  -Have severe diarrhea/constipation  -Any other questions or concerns.  - At nights (after 4:30pm), on weekends, or if urgent, call [phone] and ask the  to speak with the on-call Colorectal Surgery resident or fellow    FOLLOW-UP  You will need to follow-up with Lydia Green NP in the Colon and Rectal Surgery clinic in 1 week(s) and then with CRS Staff: Dr. Rob Dudley in 2-3 weeks after.  Please contact our clinic scheduler Shraddha Szymanski (phone # [phone]) if you have not heard from our clinic in 3 business days afer discharge to schedule a follow-up appointment.                  Follow-up Appointments     Adult [facility] Follow-up and recommended labs and tests       FOLLOW-UP  You will need to follow-up with Lydia Green NP in the Colon and Rectal Surgery clinic in 1 week(s) and then with CRS Staff: Dr. Rob Dudley in 2-3 weeks after.  Please contact our clinic scheduler Shraddha Szymanski (phone # [phone]) if you have not heard from our clinic in 3 business days afer discharge to schedule a follow-up appointment.                  Further instructions from your care team             Pending Results     No orders found for  "last 3 day(s).            Statement of Approval     Ordered          12/07/17 1415  I have reviewed and agree with all the recommendations and orders detailed in this document.  EFFECTIVE NOW     Approved and electronically signed by:  Freddie Tadeo MD             Admission Information     Date & Time Provider Department Dept. Phone    12/6/2017 Rob Dudley MD [address] [facility] [phone]      Your Vitals Were     Blood Pressure Pulse Temperature Respirations Height Weight    107/67 (BP Location: Left arm) 92 98.5  F (36.9  C) (Oral) 16 1.75 m (5' 8.9\") 69.9 kg (154 lb 1.6 oz)    Pulse Oximetry BMI (Body Mass Index)                97% 22.82 kg/m2          [employer] Information     [employer] gives you secure access to your electronic health record. If you see a primary care provider, you can also send messages to your care team and make appointments. If you have questions, please call your primary care clinic.  If you do not have a primary care provider, please call [phone] and they will assist you.        Care EveryWhere ID     This is your Care EveryWhere ID. This could be used by other organizations to access your [facility] medical records  [id number]        Equal Access to Services     SARAH DUNN AH: Cristina spiveyo Soshu, waaxda luqadaha, qaybta kaalmada adeegyada, hbargav willis. So [facility] [phone].    ATENCIÓN: Si habla español, tiene a cid disposición servicios gratuitos de asistencia lingüística. Llame al [phone].    We comply with applicable federal civil rights laws and [place] laws. We do not discriminate on the basis of race, color, national origin, age, disability, sex, sexual orientation, or gender identity.               Review of your medicines      START taking        Dose / Directions    oxyCODONE IR 5 MG tablet   Commonly known as:  ROXICODONE   Used for:  Acute post-operative pain        Dose:  5-10 mg   Take 1-2 tablets (5-10 " mg) by mouth every 3 hours as needed for moderate to severe pain   Quantity:  25 tablet   Refills:  0         CONTINUE these medicines which may have CHANGED, or have new prescriptions. If we are uncertain of the size of tablets/capsules you have at home, strength may be listed as something that might have changed.        Dose / Directions    Potassium Chloride 40 MEQ/15ML (20%) Soln   This may have changed:  when to take this   Used for:  Hypokalemia        Dose:  40 mEq   Take 15 mLs (40 mEq) by mouth daily   Quantity:  450 mL   Refills:  3         CONTINUE these medicines which have NOT CHANGED        Dose / Directions    amylase-lipase-protease 72132 UNITS Cpep   Commonly known as:  CREON   Used for:  Diarrhea, unspecified type, Peritoneal carcinomatosis (H), Cancer of sigmoid colon (H)        Dose:  2 capsule   Take 2 capsules (72,000 Units) by mouth 3 times daily (with meals) And 1 capsule with each snack tid.   Quantity:  240 capsule   Refills:  3       diphenoxylate-atropine 2.5-0.025 MG per tablet   Commonly known as:  LOMOTIL   Used for:  Diarrhea due to drug        Dose:  1-2 tablet   Take 1-2 tablets by mouth 4 times daily as needed for diarrhea   Quantity:  240 tablet   Refills:  5       IRON SUPPLEMENT PO        Dose:  325 mg   Take 325 mg by mouth 2 times daily (with meals)   Refills:  0       loperamide 2 MG capsule   Commonly known as:  IMODIUM        Dose:  4 mg   Take 4 mg by mouth 4 times daily as needed for diarrhea   Refills:  0       LORazepam 0.5 MG tablet   Commonly known as:  ATIVAN   Used for:  Peritoneal carcinomatosis (H), Cancer of sigmoid colon (H)        Dose:  0.5 mg   Take 1 tablet (0.5 mg) by mouth every 4 hours as needed (Anxiety, Nausea/Vomiting or Sleep)   Quantity:  30 tablet   Refills:  5       magnesium oxide 400 MG tablet   Commonly known as:  MAG-OX   Used for:  Hypomagnesemia        Dose:  400 mg   Take 1 tablet (400 mg) by mouth 2 times daily   Quantity:  60 tablet    Refills:  1       OMEPRAZOLE PO        Dose:  20 mg   Take 20 mg by mouth 2 times daily (before meals)   Refills:  0       opium tincture tincture   Used for:  Diarrhea, unspecified type, Cancer of sigmoid colon (H), Peritoneal carcinomatosis (H)        Dose:  6 mg   Take 0.6 mLs (6 mg) by mouth 4 times daily   Quantity:  72 mL   Refills:  0       Psyllium 51.7 % Pack   Commonly known as:  METAMUCIL FIBER   Used for:  Diarrhea, unspecified type        Dose:  1 packet   Take 1 packet by mouth 3 times daily   Quantity:  90 each   Refills:  3            Where to get your medicines      Some of these will need a paper prescription and others can be bought over the counter. Ask your nurse if you have questions.     Bring a paper prescription for each of these medications     oxyCODONE IR 5 MG tablet                Protect others around you: Learn how to safely use, store and throw away your medicines at www.disposemymeds.org.             Medication List: This is a list of all your medications and when to take them. Check marks below indicate your daily home schedule. Keep this list as a reference.      Medications           Morning Afternoon Evening Bedtime As Needed    amylase-lipase-protease 86366 UNITS Cpep   Commonly known as:  CREON   Take 2 capsules (72,000 Units) by mouth 3 times daily (with meals) And 1 capsule with each snack tid.                                diphenoxylate-atropine 2.5-0.025 MG per tablet   Commonly known as:  LOMOTIL   Take 1-2 tablets by mouth 4 times daily as needed for diarrhea                                IRON SUPPLEMENT PO   Take 325 mg by mouth 2 times daily (with meals)                                loperamide 2 MG capsule   Commonly known as:  IMODIUM   Take 4 mg by mouth 4 times daily as needed for diarrhea                                LORazepam 0.5 MG tablet   Commonly known as:  ATIVAN   Take 1 tablet (0.5 mg) by mouth every 4 hours as needed (Anxiety, Nausea/Vomiting or  Sleep)                                magnesium oxide 400 MG tablet   Commonly known as:  MAG-OX   Take 1 tablet (400 mg) by mouth 2 times daily                                OMEPRAZOLE PO   Take 20 mg by mouth 2 times daily (before meals)   Last time this was given:  20 mg on 12/7/2017  8:06 AM                                opium tincture tincture   Take 0.6 mLs (6 mg) by mouth 4 times daily                                oxyCODONE IR 5 MG tablet   Commonly known as:  ROXICODONE   Take 1-2 tablets (5-10 mg) by mouth every 3 hours as needed for moderate to severe pain   Last time this was given:  5 mg on 12/7/2017 12:09 PM                                Potassium Chloride 40 MEQ/15ML (20%) Soln   Take 15 mLs (40 mEq) by mouth daily                                Psyllium 51.7 % Pack   Commonly known as:  METAMUCIL FIBER   Take 1 packet by mouth 3 times daily

## 2017-12-06 NOTE — ANESTHESIA PROCEDURE NOTES
Peripheral Nerve Block Procedure Note    Staff:     Anesthesiologist:  MAMADOU OLMEDO    Resident/CRNA:  GABBY CHAVEZ    Block performed by resident/CRNA in the presence of a teaching physician    Location: Pre-op  Procedure Start/Stop TImes:      12/6/2017 7:00 AM     12/6/2017 7:10 AM    patient identified, IV checked, site marked, risks and benefits discussed, informed consent, monitors and equipment checked, pre-op evaluation, at physician/surgeon's request and post-op pain management      Correct Patient: Yes      Correct Position: Yes      Correct Site: Yes      Correct Procedure: Yes      Correct Laterality:  Yes    Site Marked:  Yes  Procedure details:     Procedure:  TAP    ASA:  3    Laterality:  Bilateral    Position:  Supine    Sterile Prep: chloraprep      Local skin infiltration:  None    Needle:  Short bevel    Needle length (mm):  110    Ultrasound: Yes      Ultrasound used to identify targeted nerve, plexus, or vascular structure and placed a needle adjacent to it      A permanent image is NOT entered into the patient's record.      Abnormal pain on injection: No      Blood Aspirated: No      Paresthesias:  No    Bleeding at site: No      Bolus via:  Needle    Infusion Method:  Single Shot

## 2017-12-06 NOTE — OP NOTE
DATE OF SERVICE:  12/06/2017.      PREOPERATIVE DIAGNOSES:   1.  Peritoneal carcinomatosis.   2.  Obstructing sigmoid adenocarcinoma.   3.  Chronic ulcerative pancolitis.   4.  Anxiety/depression.   5.  Chronic hypokalemia.   6.  Iron deficiency anemia.   7.  ECOG score 0.      POSTOPERATIVE DIAGNOSES:   1.  Peritoneal carcinomatosis.   2.  Obstructing sigmoid adenocarcinoma.   3.  Chronic ulcerative pancolitis.   4.  Anxiety/depression.   5.  Chronic hypokalemia.   6.  Iron deficiency anemia.   7.  ECOG score 0.      ANESTHESIA:  General endotracheal anesthesia plus bilateral TAP blocks.      PROCEDURES PERFORMED:   1.  Diagnostic laparoscopy.   2.  Exploratory laparotomy.      SURGEON:  Rob Dudley MD      ASSISTANT:  Meliton Whitney MD (General Surgery resident).      BRIEF HISTORY:  Sebas Lopez is a 52-year-old gentleman with chronic ulcerative colitis diagnosed in the 1990s who was on no medical management and had poor endoscopic surveillance, but no documented history of colon dysplasia. He presented in 05/2017 with obstructive GI symptoms.  A CT scan at that time showed sigmoid thickening and diffuse abdominal adenopathy as well as free fluid near his abdominal wall mesh.  Colonoscopy at that time showed an obstructing sigmoid colon mass.  Biopsies showed adenocarcinoma.  His obstructive symptoms worsened and he underwent exploratory laparotomy by Dr. Bartholomew in 07/2017.  Diffuse peritoneal carcinomatosis was found and this was confirmed with biopsies.  There were multiple large peritoneal metastases to his abdominal wall mesh and multiple loops of small bowel.  A small bowel resection was performed at that time with an end ileostomy.  His postoperative course was largely unremarkable.  He was subsequently evaluated by me, and I recommended systemic chemotherapy as a first step.  He received a total of 6 cycles of FOLFOX and he tolerated this relatively well. His CEA level was 2.1.  He  "underwent interval imaging with a CT of the chest, abdomen and pelvis and this showed stable disease with no significant changes in his mesenteric nodules.  There was no evidence of metastatic disease in the liver or chest.  His case was discussed at our multidisciplinary colorectal cancer conference and we recommended diagnostic laparoscopy with possible cytoreductive surgery and hyperthermic intraperitoneal chemotherapy.  I thoroughly discussed risks, benefits and alternatives of operative treatment with Sebas and he agreed to proceed.      DESCRIPTION OF PROCEDURE:  After obtaining informed consent, the patient was brought to the operating room and placed in the modified lithotomy position.  General endotracheal anesthesia was gently induced without difficulty.  The patient underwent preoperative placement of bilateral TAP blocks.  He also received appropriate preoperative antibiotic prophylaxis as well as mechanical and chemical DVT prophylaxis.  Bilateral lower extremity pneumatic compression devices were applied and all pressure points were cushioned.  A Mccarthy catheter was inserted.  The abdomen was prepped and draped in the standard sterile fashion.  A \"timeout\" was performed.  A 12 mm upper midline incision was placed and the peritoneal cavity was entered under direct vision.  A 12 mm balloon tip trocar was then placed at this incision and pneumoperitoneum was established up to 15 mmHg without difficulty.  A 10 mm 30-degree angle laparoscope was then used to assess the peritoneal cavity.  There were multiple adhesions to the lower midline and from what we could assess his peritoneal cancer index was 18-19.  Given that we were not able to visualize things well with diagnostic laparoscopy, I decided to make a laparotomy incision in order to assess the peritoneal cavity adequately.  The pneumoperitoneum was desufflated and the trocar was removed.  A midline incision was placed and the peritoneal cavity was " entered under direct vision.  He did have a previously placed abdominal wall mesh that had multiple tumors and small bowel adhesed to the mesh as well.  We did a limited adhesiolysis with care to not cause any intestinal injuries.  A thorough exam of the peritoneal cavity was performed.  His peritoneal cancer index was 26.  He also had diffuse miliary involvement of the small intestine as well as the small bowel mesentery.  Given these findings, we elected to not perform cytoreductive surgery and hyperthermic intraperitoneal chemotherapy.  Hemostasis was corroborated.  Instrument, sponge and needle counts were all correct as reported to me.  The midline incision was reapproximated at the fascial level with multiple interrupted figure-of-eight #1 Prolene sutures including the abdominal wall mesh.  Care was taken to not place the sutures close to any of the intestinal adhesions to the mesh.  The wound was irrigated with dilute peroxide.  The deep dermis was reapproximated with a running 3-0 Vicryl suture.  The skin was reapproximated with a running subcuticular 4-0 PDS suture and Dermabond Prineo.  A new sterile ostomy appliance was placed and an abdominal binder was also placed.  The Mccarthy catheter was removed at the end of the operation.  The patient tolerated the procedure well.      COMPLICATIONS:  None immediately.      ESTIMATED BLOOD LOSS:  50 mL.      REPLACEMENT:  1200 mL of crystalloid and 500 mL of colloid.      DRAINS AND TUBES:  None.      SPECIMENS:  None.      FINDINGS:  Peritoneal cancer index of 26 with diffuse involvement of the small bowel as well as the small bowel mesentery.      DISPOSITION:  PACU.         NYASIA LANDEROS MD             D: 2017 09:51   T: 2017 10:14   MT: CHARY      Name:     KARINA MINER   MRN:      [id number]        Account:        [id number]   :      1965           Procedure Date: 2017      Document: [id number]       cc: Albert Long MD        Donovan Han MD       [facility] Surgery Billing

## 2017-12-06 NOTE — ANESTHESIA POSTPROCEDURE EVALUATION
Patient: Sebas Lopez    Procedure(s):  Diagnostic Laparoscopy, Exploratory Laparotomy Anesthesia Block  - Wound Class: III-Contaminated   - Wound Class: III-Contaminated    Diagnosis:Metastatic Colon Cancer   Diagnosis Additional Information: No value filed.    Anesthesia Type:  General, ETT    Note:  Anesthesia Post Evaluation    Patient location during evaluation: PACU  Patient participation: Able to fully participate in evaluation  Level of consciousness: awake and alert  Pain management: adequate  Airway patency: patent  Cardiovascular status: hemodynamically stable  Respiratory status: acceptable  Hydration status: acceptable  PONV: none             Last vitals:  Vitals:    12/06/17 1015 12/06/17 1030 12/06/17 1040   BP: 113/73 108/72 107/72   Resp: 16 14 14   Temp:   36.7  C (98.1  F)   SpO2: 98% 97% 95%         Electronically Signed By: Lito Martines MD  December 6, 2017  10:45 AM

## 2017-12-06 NOTE — PLAN OF CARE
Problem: Patient Care Overview  Goal: Individualization & Mutuality  PACU--7C arrived ~ 11:10 am settled by 7C CN and NA as I was busy elsewhere. I am assessing him now. He is alert and initiates appropriate conversation. Family at bedside. Abd binder in place. Scant bright red blood noted at naval level of his incision. Ileostomy pouch intact: Pink/red, protruding stoma. PCA Dilaudid  in use he states his pain is managed. Capno WNL with O2 NC 2L. R PORT 50 cc/hr LR. P Boots on.

## 2017-12-06 NOTE — ANESTHESIA PREPROCEDURE EVALUATION
Anesthesia Evaluation     .             ROS/MED HX    ENT/Pulmonary:       Neurologic:       Cardiovascular: Comment: H/O Lyme disease (1990's) requiring pacing x1 day -- resolved.    (+) ----. : . . . :. . Previous cardiac testing date:results:date: results:ECG reviewed date: results: date: results:          METS/Exercise Tolerance:  4 - Raking leaves, gardening   Hematologic:     (+) Anemia, -      Musculoskeletal:   (+) , , other musculoskeletal- H/o Xkmc-Acfoz-Bcqtish, s/p L total hip replacement      GI/Hepatic:     (+) GERD Other GI/Hepatic H/o ulcerative colitis x ~20 years, recently dx'd stage 4 sigmoid adenocarcinoma with peritoneal carcinomatosis.  S/p 6 cycles of FOLFOX.         Renal/Genitourinary:         Endo:         Psychiatric:     (+) psychiatric history anxiety      Infectious Disease:         Malignancy:   (+) Malignancy History of Other  Other CA Metastatic sigmoid adenomaCA Active status post Chemo and Surgery         Other:                     Physical Exam  Normal systems: cardiovascular, pulmonary and dental    Airway   Mallampati: II  TM distance: >3 FB  Neck ROM: full    Dental     Cardiovascular       Pulmonary                     Anesthesia Plan      History & Physical Review  History and physical reviewed and following examination; no interval change.    ASA Status:  3 .    NPO Status:  > 8 hours    Plan for General and ETT with Intravenous induction. Maintenance will be Balanced.    PONV prophylaxis:  Ondansetron (or other 5HT-3) and Dexamethasone or Solumedrol  Additional equipment: 2nd IV      Postoperative Care  Postoperative pain management:  Multi-modal analgesia.      Consents  Anesthetic plan, risks, benefits and alternatives discussed with:  Patient..                          .

## 2017-12-07 VITALS
SYSTOLIC BLOOD PRESSURE: 107 MMHG | OXYGEN SATURATION: 97 % | RESPIRATION RATE: 16 BRPM | BODY MASS INDEX: 22.82 KG/M2 | WEIGHT: 154.1 LBS | HEIGHT: 69 IN | HEART RATE: 92 BPM | TEMPERATURE: 98.5 F | DIASTOLIC BLOOD PRESSURE: 67 MMHG

## 2017-12-07 LAB — GLUCOSE BLDC GLUCOMTR-MCNC: 111 MG/DL (ref 70–99)

## 2017-12-07 PROCEDURE — 25000125 ZZHC RX 250: Performed by: COLON & RECTAL SURGERY

## 2017-12-07 PROCEDURE — 40000901 ZZH STATISTIC WOC PT EDUCATION, 0-15 MIN

## 2017-12-07 PROCEDURE — 25000132 ZZH RX MED GY IP 250 OP 250 PS 637: Performed by: COLON & RECTAL SURGERY

## 2017-12-07 PROCEDURE — 99231 SBSQ HOSP IP/OBS SF/LOW 25: CPT | Performed by: NURSE PRACTITIONER

## 2017-12-07 PROCEDURE — 00000146 ZZHCL STATISTIC GLUCOSE BY METER IP

## 2017-12-07 PROCEDURE — 99211 OFF/OP EST MAY X REQ PHY/QHP: CPT

## 2017-12-07 PROCEDURE — 25000128 H RX IP 250 OP 636: Performed by: COLON & RECTAL SURGERY

## 2017-12-07 RX ORDER — HEPARIN SODIUM (PORCINE) LOCK FLUSH IV SOLN 100 UNIT/ML 100 UNIT/ML
5 SOLUTION INTRAVENOUS
Status: DISCONTINUED | OUTPATIENT
Start: 2017-12-07 | End: 2017-12-07 | Stop reason: HOSPADM

## 2017-12-07 RX ORDER — HYDROMORPHONE HCL/0.9% NACL/PF 0.2MG/0.2
.2-.4 SYRINGE (ML) INTRAVENOUS
Status: DISCONTINUED | OUTPATIENT
Start: 2017-12-07 | End: 2017-12-07 | Stop reason: HOSPADM

## 2017-12-07 RX ORDER — IBUPROFEN 800 MG/1
800 TABLET, FILM COATED ORAL 3 TIMES DAILY
Status: DISCONTINUED | OUTPATIENT
Start: 2017-12-07 | End: 2017-12-07 | Stop reason: HOSPADM

## 2017-12-07 RX ORDER — LIDOCAINE 40 MG/G
CREAM TOPICAL
Status: DISCONTINUED | OUTPATIENT
Start: 2017-12-07 | End: 2017-12-07 | Stop reason: HOSPADM

## 2017-12-07 RX ORDER — HEPARIN SODIUM,PORCINE 10 UNIT/ML
5-10 VIAL (ML) INTRAVENOUS
Status: DISCONTINUED | OUTPATIENT
Start: 2017-12-07 | End: 2017-12-07 | Stop reason: HOSPADM

## 2017-12-07 RX ORDER — HEPARIN SODIUM,PORCINE 10 UNIT/ML
5-10 VIAL (ML) INTRAVENOUS EVERY 24 HOURS
Status: DISCONTINUED | OUTPATIENT
Start: 2017-12-07 | End: 2017-12-07 | Stop reason: HOSPADM

## 2017-12-07 RX ORDER — OXYCODONE HYDROCHLORIDE 5 MG/1
5-10 TABLET ORAL
Qty: 25 TABLET | Refills: 0 | Status: SHIPPED | OUTPATIENT
Start: 2017-12-07 | End: 2018-01-01

## 2017-12-07 RX ORDER — OXYCODONE HYDROCHLORIDE 5 MG/1
5-10 TABLET ORAL
Status: DISCONTINUED | OUTPATIENT
Start: 2017-12-07 | End: 2017-12-07 | Stop reason: HOSPADM

## 2017-12-07 RX ADMIN — OXYCODONE HYDROCHLORIDE 10 MG: 5 TABLET ORAL at 14:49

## 2017-12-07 RX ADMIN — ACETAMINOPHEN 1000 MG: 500 TABLET, FILM COATED ORAL at 08:05

## 2017-12-07 RX ADMIN — KETOROLAC TROMETHAMINE 15 MG: 30 INJECTION, SOLUTION INTRAMUSCULAR at 08:12

## 2017-12-07 RX ADMIN — ACETAMINOPHEN 1000 MG: 500 TABLET, FILM COATED ORAL at 12:41

## 2017-12-07 RX ADMIN — SODIUM CHLORIDE, PRESERVATIVE FREE 5 ML: 5 INJECTION INTRAVENOUS at 13:58

## 2017-12-07 RX ADMIN — CIPROFLOXACIN 400 MG: 2 INJECTION, SOLUTION INTRAVENOUS at 02:43

## 2017-12-07 RX ADMIN — OMEPRAZOLE 20 MG: 20 CAPSULE, DELAYED RELEASE ORAL at 08:06

## 2017-12-07 RX ADMIN — METRONIDAZOLE 500 MG: 500 INJECTION, SOLUTION INTRAVENOUS at 05:34

## 2017-12-07 RX ADMIN — OXYCODONE HYDROCHLORIDE 5 MG: 5 TABLET ORAL at 12:09

## 2017-12-07 RX ADMIN — IBUPROFEN 800 MG: 800 TABLET, FILM COATED ORAL at 13:57

## 2017-12-07 RX ADMIN — POTASSIUM CHLORIDE 40 MEQ: 40 SOLUTION ORAL at 08:06

## 2017-12-07 ASSESSMENT — PAIN DESCRIPTION - DESCRIPTORS
DESCRIPTORS: ACHING
DESCRIPTORS: ACHING;SORE
DESCRIPTORS: ACHING
DESCRIPTORS: ACHING

## 2017-12-07 NOTE — PLAN OF CARE
Problem: Patient Care Overview  Goal: Individualization & Mutuality  He is motivated to use the IS and to walk in the rivera. Lilian vigil dc'tabitha ~ 11 am.

## 2017-12-07 NOTE — PLAN OF CARE
Problem: Patient Care Overview  Goal: Plan of Care/Patient Progress Review  Outcome: Improving  VSS. CAPNO WNL. Pain controled c PCA Dilaudid and scheduled Tylenol. Using IS. Ileostomy c large watery output. Voided adequate amount. Tolerated regular diet for supper. Ambulated halls on 7C and 7D. Tolerated activity well. Abdominal incision c ss drainage by naval. Gauze on site. Abdominal binder on. SO staying over night. Continue c POC.    Patient bedside report.

## 2017-12-07 NOTE — PROGRESS NOTES
"[facility] Postoperative Ostomy     Diagnosis: 52 M with chronic UC who presented 05/2017 with obstructive symptoms, s/p exploratory laparotomy 07/2017 at OSH. At that time he had diffuse carcinomatosis and had a small bowel resection with end ileostomy. He recently finished chemotherapy now POD0 from exploratory laparotomy with aborted CRS + HIPEC due to diffuse carcinomatosis  Type Stoma: loop ileostomy  Date of Surgery: ileostomy since 7/2017  Appliances used: [place] 2 piece flat barrier cut to 1\" with high output pouch  Accessory products used: barrier ring    Wear Time: 3 days   Size of stoma: 1 1/8\" slightly oval.    Condition of peristomal skin: erythema and superficial erosions at 3 o'clock   Anatomy or output affecting wear time: Stoma rests on the skin at 3 o'clock  Psychosocial issues: pt independent with cares    Intervention: Stoma/peristonal skin and appliance needs assessed, teaching done re: modification in ostomy care and psychosocial issues discussed    Assessment/Plan: recommend cutting barrier larger and applying barrier ring to the skin instead of the barrier    Follow up: as needed in ostomy outpatient Clinic  Time: 20 minutes   "

## 2017-12-07 NOTE — PLAN OF CARE
Problem: Patient Care Overview  Goal: Plan of Care/Patient Progress Review  Outcome: Improving  Pain: Pt pain managed PCA Dilaudid 0.2 mg  VSS per patient baseline, remains on CAPNO   Output:  Voided one time this shift, 525 mL, encouraged pt to intake fluids   Diet: Regular diet   Bowel Function: Bowel sounds heard in all four quadrants, passing gas, high output from ileostomy  Lines: Left PIV saline locked and dressing CDI. Right chest port infusing, dressing CDI   Incision: Midline incision, liquid bandage, no drainage, abdominal binder in place   Activity: Ambulate with assist   Additional notes: Pt encouraged to intake fluids, utilize IS, and CDB

## 2017-12-07 NOTE — PROGRESS NOTES
SURGERY POST-OP CHECK NOTE  12/06/2017 6:06 PM    Patient: Sebas Lopez  MRN: [id number]    Subjective  No acute events postoperatively. Pain well controlled. Tolerating regular diet. Denies nausea, vomiting, chest pain, shortness of breath. Has not voided since surgery, was just getting up to use the bathroom. Gas and stool in ostomy bag.      Objective  Temp:  [96.4  F (35.8  C)-98.4  F (36.9  C)] 96.4  F (35.8  C)  Pulse:  [] 98  Heart Rate:  [77-99] 95  Resp:  [8-16] 16  BP: (106-116)/(63-92) 106/71  SpO2:  [92 %-100 %] 95 %    General: alert and oriented, NAD, lying in bed, appears comfortable  Pulm: Breathing comfortably on 2 L NC  Abd: soft, appropriately tender to palpation, non-distended. Superior portion of incision draining SS fluid, otherwise incision c/d/i. Ostomy in place with gas and stool in bag  Neuro: facial movement grossly symmetric, moving all extremities spontaneously without apparent deficit    I/O last 3 completed shifts:  In: 2385.83 [P.O.:350; I.V.:1785.83]  Out: 185 [Urine:135; Blood:50]      Assessment/Plan  52 M with chronic UC who presented 05/2017 with obstructive symptoms, s/p exploratory laparotomy 07/2017 at OSH. At that time he had diffuse carcinomatosis and had a small bowel resection with end ileostomy. He recently finished chemotherapy now POD0 from exploratory laparotomy with aborted CRS + HIPEC due to diffuse carcinomatosis.     - No changes at this time  - May require intermittent catheterization, discussed with patient that he should inform us if he fails to void after eating dinner    Neuro:      - Pain: Dilaudid PCA, celebrex, toradol PRN  CV: HDS, no issues  Pulm: satting on 2 L NC, encourage IS   FEN/GI:      - IVF: LR @ 50 cc/hr     - Diet: Regular diet     - Nausea control: Compazine PRN      - BMP in am  Renal: Mccarthy removed, has not voided since surgery. Will bladder scan if unable to void following his meal.  Heme: no s/sx bleeding, CBC in am  ID:  afebrile, no leukocytosis, Cipro/Flagyl for post-op prophylaxis  PPx: OOB, IS, SCDs, Lovenox to start in am, Omeprazole    - - - - - - - - - - - - - - - - - -  Freddie Tadeo, PGY-1  General Surgery  [phone]

## 2017-12-07 NOTE — PLAN OF CARE
Problem: Patient Care Overview  Goal: Individualization & Mutuality  R PORT de-accessed after a good blood return. Pt believes he will go home today. Dr Dudley spent time at bedside assessing and talking with Mr John and his S.O. about future cares/plan. He voids spont, Liq stool via stoma, pouch intact. PIV patent. Good appetite with regular meals.  Incision: midline abd is C/D/I with dermabond closure. Pain managed with po meds: he is able to rest and is active to walk in the rivera and use the IS.

## 2017-12-07 NOTE — DISCHARGE SUMMARY
[facility]  Discharge Summary  Colon and Rectal Surgery     Sebas Lopez MRN# [id number]   YOB: 1965 Age: 52 year old     Date of Admission:  12/6/2017  Date of Discharge::  12/7/2017  Admitting Physician:  Rob Dudley MD  Discharge Physician:  Rob Dudley MD  Primary Care Physician:        Jaguar Becker          Admission Diagnoses:   Metastatic Colon Cancer   Peritoneal carcinomatosis (H)          Discharge Diagnosis:   Metastatic Colon Cancer   Peritoneal carcinomatosis (H)         Procedures:   12/6/2017:    PROCEDURES PERFORMED:   1.  Diagnostic laparoscopy.   2.  Exploratory laparotomy.   Peritoneal cancer index of 26 with diffuse involvement of the small bowel as well as the small bowel mesentery.          Consultations:   WOUND OSTOMY CONTINENCE NURSE  IP CONSULT         Imaging Studies:     Results for orders placed or performed in visit on 11/06/17   CT Chest/Abdomen/Pelvis w Contrast    Narrative    Examination:  CT CHEST/ABDOMEN/PELVIS W CONTRAST .     Indication:  Sigmoid colon cancer, peritoneal carcinomatosis.     Comparison: 7/31/2017.    Technique: CT of Chest, abdomen and pelvis was acquired from thoracic  inlet to pubic symphysis with IV contrast and without oral contrast.  Images were reconstructed in axial and coronal sections. Images were  reviewed in lung, soft tissue, liver, bone windows.     Contrast: Isovue 370 95cc    Findings:   Chest: The lungs are clear. The airways are normal in appearance. No  pleural effusions. Right IJ Port-A-Cath tip at the cavoatrial  junction. No axillary, mediastinal, or hilar lymphadenopathy by size  criteria.    Abdomen/ pelvis: Right lower quadrant ileostomy. No liver lesions.  Gallbladder is decompressed. No intrahepatic biliary dilatation. The  pancreas, spleen, and adrenal glands are unremarkable. Small bilateral  renal cysts, not significantly changed, several of which  remain  indeterminate.   Portal vein, Celiac access, SMA are patent.    The bladder is unremarkable. Again demonstrated in the sigmoid colon  is narrowing and mucinous deposits, unchanged in size. Deposit in the  left side of the pelvis were not significantly changed. For example,  (series 2 image 100) demonstrates several deposits largest of which is  1.6 cm. Additional deposits in the left anterior abdomen series 2  image 73 also not significantly changed.     There is thickening along the anterior abdominal wall deep to it to a  mesh and this may be postsurgical in etiology although mucinous  deposits are not excluded.    Bones: No suspicious osseous lesions. Prosthetic left hip.      Impression    Impression:   1. Revisualization of the sigmoid colon mass with adjacent mesenteric  adenopathy, not significantly changed.  2. No significant change in mesenteric nodules.  3. No evidence of metastatic disease in the liver or chest.  4. Postsurgical changes of loop ileostomy. No evidence of obstruction.  5. No significant change in small indeterminate cysts in the kidneys,  continued attention follow-up.    ZECHARIAH MOREL MD            Medications Prior to Admission:     Prescriptions Prior to Admission   Medication Sig Dispense Refill Last Dose     opium tincture tincture Take 0.6 mLs (6 mg) by mouth 4 times daily 72 mL 0 12/6/2017 at 500     diphenoxylate-atropine (LOMOTIL) 2.5-0.025 MG per tablet Take 1-2 tablets by mouth 4 times daily as needed for diarrhea 240 tablet 5 12/5/2017 at Unknown time     amylase-lipase-protease (CREON) 32318 UNITS CPEP Take 2 capsules (72,000 Units) by mouth 3 times daily (with meals) And 1 capsule with each snack tid. 240 capsule 3 12/5/2017 at 2000     LORazepam (ATIVAN) 0.5 MG tablet Take 1 tablet (0.5 mg) by mouth every 4 hours as needed (Anxiety, Nausea/Vomiting or Sleep) 30 tablet 5 12/6/2017 at 500     magnesium oxide (MAG-OX) 400 MG tablet Take 1 tablet (400 mg) by mouth 2  times daily 60 tablet 1 12/5/2017 at 1000     OMEPRAZOLE PO Take 20 mg by mouth 2 times daily (before meals)   12/6/2017 at 500     Potassium Chloride 40 MEQ/15ML (20%) SOLN Take 15 mLs (40 mEq) by mouth daily (Patient taking differently: Take 40 mEq by mouth every morning ) 450 mL 3 12/5/2017 at 900     Psyllium (METAMUCIL FIBER) 51.7 % PACK Take 1 packet by mouth 3 times daily 90 each 3 12/5/2017 at 1200     loperamide (IMODIUM) 2 MG capsule Take 4 mg by mouth 4 times daily as needed for diarrhea    12/5/2017 at Unknown time     Ferrous Sulfate (IRON SUPPLEMENT PO) Take 325 mg by mouth 2 times daily (with meals)    12/5/2017 at 1900            Discharge Medications:     Current Discharge Medication List      START taking these medications    Details   oxyCODONE IR (ROXICODONE) 5 MG tablet Take 1-2 tablets (5-10 mg) by mouth every 3 hours as needed for moderate to severe pain  Qty: 25 tablet, Refills: 0    Associated Diagnoses: Acute post-operative pain         CONTINUE these medications which have NOT CHANGED    Details   opium tincture tincture Take 0.6 mLs (6 mg) by mouth 4 times daily  Qty: 72 mL, Refills: 0    Associated Diagnoses: Diarrhea, unspecified type; Cancer of sigmoid colon (H); Peritoneal carcinomatosis (H)      diphenoxylate-atropine (LOMOTIL) 2.5-0.025 MG per tablet Take 1-2 tablets by mouth 4 times daily as needed for diarrhea  Qty: 240 tablet, Refills: 5    Associated Diagnoses: Diarrhea due to drug      amylase-lipase-protease (CREON) 45642 UNITS CPEP Take 2 capsules (72,000 Units) by mouth 3 times daily (with meals) And 1 capsule with each snack tid.  Qty: 240 capsule, Refills: 3    Comments: Mail to patient if not yet due to be filled.  Associated Diagnoses: Diarrhea, unspecified type; Peritoneal carcinomatosis (H); Cancer of sigmoid colon (H)      LORazepam (ATIVAN) 0.5 MG tablet Take 1 tablet (0.5 mg) by mouth every 4 hours as needed (Anxiety, Nausea/Vomiting or Sleep)  Qty: 30 tablet,  "Refills: 5    Associated Diagnoses: Peritoneal carcinomatosis (H); Cancer of sigmoid colon (H)      magnesium oxide (MAG-OX) 400 MG tablet Take 1 tablet (400 mg) by mouth 2 times daily  Qty: 60 tablet, Refills: 1    Associated Diagnoses: Hypomagnesemia      OMEPRAZOLE PO Take 20 mg by mouth 2 times daily (before meals)      Potassium Chloride 40 MEQ/15ML (20%) SOLN Take 15 mLs (40 mEq) by mouth daily  Qty: 450 mL, Refills: 3    Associated Diagnoses: Hypokalemia      Psyllium (METAMUCIL FIBER) 51.7 % PACK Take 1 packet by mouth 3 times daily  Qty: 90 each, Refills: 3    Associated Diagnoses: Diarrhea, unspecified type      loperamide (IMODIUM) 2 MG capsule Take 4 mg by mouth 4 times daily as needed for diarrhea       Ferrous Sulfate (IRON SUPPLEMENT PO) Take 325 mg by mouth 2 times daily (with meals)                  Brief History of Illness:   52 year old male, chronic UC presented 5/2017 with obstructive symptoms, s/p ex lap 7/2017 at OSH.  At that time, found to have diffuse carcinomatosis and small bowel resection with end ileostomy.  Recently completed chemotherapy.  Admitted on 12/6/2017 for exploratory laparotomy with aborted HIPEC due to diffuse carcinomatosis.            Hospital Course:   Post-operatively pt was hemodynamically stable.  Pain was controlled with a dilaudid PCA.  Mccarthy was removed and pt was able to void without difficulties.  Pt was able to tolerate diet.  And pain was controlled with oxycodone.      Patient is to follow up in the Colon and Rectal Surgery Clinic in 1 week with Lydia Green NP and then with Dr. Dudley in 2-3 weeks after.          Day of Discharge Physical Exam:   Blood pressure 107/67, pulse 92, temperature 98.5  F (36.9  C), temperature source Oral, resp. rate 16, height 1.75 m (5' 8.9\"), weight 69.9 kg (154 lb 1.6 oz), SpO2 97 %.    Gen: AAOx3, NAD  Pulm: Non-labored breathing  Abd: Soft, appropriately tender, no guarding/rebound   Incision C/D/I    Stoma " pink and viable with stool and gas in bag  Ext:  Warm and well-perfused         Discharge Instructions and Follow-Up:       Discharge Procedure Orders  Reason for your hospital stay   Order Comments: S/p exploratory laparotomy with aborted cytoreductive surgery and HIPEC     Adult [facility] Follow-up and recommended labs and tests   Order Comments: FOLLOW-UP  You will need to follow-up with Lydia Green NP in the Colon and Rectal Surgery clinic in 1 week(s) and then with CRS Staff: Dr. Rob Dudley in 2-3 weeks after.  Please contact our clinic scheduler Shraddha Szymanski (phone # [phone]) if you have not heard from our clinic in 3 business days afer discharge to schedule a follow-up appointment.     Activity   Order Comments: ACTIVITY  -No lifting, pushing, pulling greater than 10 lbs and no strenuous exercise for 4 weeks   -No driving while on narcotic analgesics (i.e. Percocet, oxycodone, Vicodin)  -No driving until you are able to fully twist to both sides or slam on brakes quickly and without any pain   Order Specific Question Answer Comments   Is discharge order? Yes      Discharge Instructions   Order Comments: DIET  -Regular diet  -We recommend eating slowly, chewing thoroughly, eating small frequent meals throughout the day  -Stay well hydrated.      ACTIVITY  -No lifting, pushing, pulling greater than 10 lbs and no strenuous exercise for 4 weeks   -No driving while on narcotic analgesics (i.e. Percocet, oxycodone, Vicodin)  -No driving until you are able to fully twist to both sides or slam on brakes quickly and without any pain    WOUND CLINIC  -Inspect your wounds daily for signs of infection (increased redness, drainage, pain)  -Keep your wound clean and dry  -You may shower, but do not soak in tub or pool    NOTIFY  Please contact Jef Prajapati LPN, Marisela Collado RN at [phone] for problems after discharge such as:  -Temperature > 101F, chills, rigors, dizziness  -Redness around or  purulent drainage from wound  -Inability to tolerate diet, nausea or vomiting  -You stop passing gas, develop significant bloating, abdominal pain  -Have blood in stools/vomit  -Have severe diarrhea/constipation  -Any other questions or concerns.  - At nights (after 4:30pm), on weekends, or if urgent, call [phone] and ask the  to speak with the on-call Colorectal Surgery resident or fellow    FOLLOW-UP  You will need to follow-up with Lydia Green NP in the Colon and Rectal Surgery clinic in 1 week(s) and then with CRS Staff: Dr. Rob Dudley in 2-3 weeks after.  Please contact our clinic scheduler Shraddha Stephon (phone # [phone]) if you have not heard from our clinic in 3 business days afer discharge to schedule a follow-up appointment.     Full Code     Diet   Order Comments: Follow this diet upon discharge: Regular diet   Order Specific Question Answer Comments   Is discharge order? Yes               Home Health Care:   Not needed           Discharge Disposition:   Discharged to home      Condition at discharge: Stable    Dayan Sellers PA-C  Colon and Rectal Surgery     Pt was seen and discussed with Dr. Worthington on 12/7/2017

## 2017-12-07 NOTE — PLAN OF CARE
Problem: Patient Care Overview  Goal: Individualization & Mutuality  7C--home he left via w/c with his S.O. ~ 3 pm. He is aware to stop at 3rd floor pharm for Rx. I have given him written and verbal discharge instructions and answered his questions.

## 2017-12-08 ENCOUNTER — TELEPHONE (OUTPATIENT)
Dept: SURGERY | Facility: CLINIC | Age: 52
End: 2017-12-08

## 2017-12-08 NOTE — TELEPHONE ENCOUNTER
----- Message from Dayan Sellers PA-C sent at 12/7/2017  2:37 PM CST -----  Regarding: discharge home 12/7  Hi Everyone,    Please schedule Sebas Lopez for follow-up in clinic with Lydia Green NP in 1 week(s) and then [facility] in 2-3 weeks    Date of discharge:  12/7.  S/p ex lap, HIPEC aborted due to diffuse carcinomatosis.     Discharged to:  home   Home Cares:  none    -Antibiotics:  none  -Pain Medications:  oxy  -Ostomy:  Pre-existing ostomy  -Special Concerns:  none    Thanks!    Yuli  Pgr: [phone]

## 2017-12-08 NOTE — TELEPHONE ENCOUNTER
Per task, called and spoke with patient.  Patient is scheduled to see Lydia Green NP  12/14/17 at 9:15 am.

## 2017-12-14 ENCOUNTER — INFUSION THERAPY VISIT (OUTPATIENT)
Dept: INFUSION THERAPY | Facility: CLINIC | Age: 52
End: 2017-12-14
Attending: NURSE PRACTITIONER
Payer: COMMERCIAL

## 2017-12-14 ENCOUNTER — OFFICE VISIT (OUTPATIENT)
Dept: SURGERY | Facility: CLINIC | Age: 52
End: 2017-12-14
Payer: COMMERCIAL

## 2017-12-14 VITALS
BODY MASS INDEX: 21.49 KG/M2 | TEMPERATURE: 98.5 F | DIASTOLIC BLOOD PRESSURE: 83 MMHG | HEIGHT: 69 IN | HEART RATE: 112 BPM | OXYGEN SATURATION: 97 % | SYSTOLIC BLOOD PRESSURE: 115 MMHG | WEIGHT: 145.1 LBS

## 2017-12-14 VITALS — DIASTOLIC BLOOD PRESSURE: 76 MMHG | SYSTOLIC BLOOD PRESSURE: 115 MMHG

## 2017-12-14 DIAGNOSIS — C18.7 CANCER OF SIGMOID COLON (H): ICD-10-CM

## 2017-12-14 DIAGNOSIS — C78.6 PERITONEAL CARCINOMATOSIS (H): Primary | ICD-10-CM

## 2017-12-14 DIAGNOSIS — R19.7 DIARRHEA: ICD-10-CM

## 2017-12-14 DIAGNOSIS — Z09 FOLLOW-UP EXAMINATION FOLLOWING SURGERY: ICD-10-CM

## 2017-12-14 DIAGNOSIS — R19.7 DIARRHEA, UNSPECIFIED TYPE: Primary | ICD-10-CM

## 2017-12-14 DIAGNOSIS — R19.7 DIARRHEA, UNSPECIFIED TYPE: ICD-10-CM

## 2017-12-14 LAB
ALBUMIN SERPL-MCNC: 2.8 G/DL (ref 3.4–5)
ALP SERPL-CCNC: 172 U/L (ref 40–150)
ALT SERPL W P-5'-P-CCNC: 24 U/L (ref 0–70)
ANION GAP SERPL CALCULATED.3IONS-SCNC: 9 MMOL/L (ref 3–14)
AST SERPL W P-5'-P-CCNC: 28 U/L (ref 0–45)
BASOPHILS # BLD AUTO: 0.1 10E9/L (ref 0–0.2)
BASOPHILS NFR BLD AUTO: 0.5 %
BILIRUB SERPL-MCNC: 0.4 MG/DL (ref 0.2–1.3)
BUN SERPL-MCNC: 14 MG/DL (ref 7–30)
C DIFF TOX B STL QL: NEGATIVE
CALCIUM SERPL-MCNC: 8.3 MG/DL (ref 8.5–10.1)
CHLORIDE SERPL-SCNC: 99 MMOL/L (ref 94–109)
CO2 SERPL-SCNC: 25 MMOL/L (ref 20–32)
CREAT SERPL-MCNC: 0.95 MG/DL (ref 0.66–1.25)
DIFFERENTIAL METHOD BLD: ABNORMAL
EOSINOPHIL # BLD AUTO: 1 10E9/L (ref 0–0.7)
EOSINOPHIL NFR BLD AUTO: 7.7 %
ERYTHROCYTE [DISTWIDTH] IN BLOOD BY AUTOMATED COUNT: 15.7 % (ref 10–15)
GFR SERPL CREATININE-BSD FRML MDRD: 83 ML/MIN/1.7M2
GLUCOSE SERPL-MCNC: 102 MG/DL (ref 70–99)
HCT VFR BLD AUTO: 32.1 % (ref 40–53)
HGB BLD-MCNC: 10.6 G/DL (ref 13.3–17.7)
IMM GRANULOCYTES # BLD: 0.1 10E9/L (ref 0–0.4)
IMM GRANULOCYTES NFR BLD: 1 %
LYMPHOCYTES # BLD AUTO: 1.9 10E9/L (ref 0.8–5.3)
LYMPHOCYTES NFR BLD AUTO: 14.1 %
MCH RBC QN AUTO: 29.2 PG (ref 26.5–33)
MCHC RBC AUTO-ENTMCNC: 33 G/DL (ref 31.5–36.5)
MCV RBC AUTO: 88 FL (ref 78–100)
MONOCYTES # BLD AUTO: 1.4 10E9/L (ref 0–1.3)
MONOCYTES NFR BLD AUTO: 10.5 %
NEUTROPHILS # BLD AUTO: 9 10E9/L (ref 1.6–8.3)
NEUTROPHILS NFR BLD AUTO: 66.2 %
NRBC # BLD AUTO: 0 10*3/UL
NRBC BLD AUTO-RTO: 0 /100
PLATELET # BLD AUTO: 540 10E9/L (ref 150–450)
POTASSIUM SERPL-SCNC: 3.9 MMOL/L (ref 3.4–5.3)
PROT SERPL-MCNC: 7.8 G/DL (ref 6.8–8.8)
RBC # BLD AUTO: 3.63 10E12/L (ref 4.4–5.9)
SODIUM SERPL-SCNC: 133 MMOL/L (ref 133–144)
SPECIMEN SOURCE: NORMAL
WBC # BLD AUTO: 13.5 10E9/L (ref 4–11)

## 2017-12-14 PROCEDURE — 80053 COMPREHEN METABOLIC PANEL: CPT | Performed by: NURSE PRACTITIONER

## 2017-12-14 PROCEDURE — 25000128 H RX IP 250 OP 636: Mod: ZF | Performed by: NURSE PRACTITIONER

## 2017-12-14 PROCEDURE — 85025 COMPLETE CBC W/AUTO DIFF WBC: CPT | Performed by: NURSE PRACTITIONER

## 2017-12-14 PROCEDURE — 96360 HYDRATION IV INFUSION INIT: CPT

## 2017-12-14 RX ADMIN — SODIUM CHLORIDE, POTASSIUM CHLORIDE, SODIUM LACTATE AND CALCIUM CHLORIDE 1000 ML: 600; 310; 30; 20 INJECTION, SOLUTION INTRAVENOUS at 10:14

## 2017-12-14 ASSESSMENT — PAIN SCALES - GENERAL: PAINLEVEL: NO PAIN (0)

## 2017-12-14 NOTE — MR AVS SNAPSHOT
After Visit Summary   12/14/2017    Sebas Lopez    MRN: [id number]           Patient Information     Date Of Birth          1965        Visit Information        Provider Department      12/14/2017 10:00 AM UC [facility] ATC; UC SPEC INFUSION [facility]        Today's Diagnoses     Diarrhea, unspecified type    -  1    Cancer of sigmoid colon (H)        Diarrhea           Follow-ups after your visit        Future tests that were ordered for you today     Open Future Orders        Priority Expected Expires Ordered    Clostridium difficile toxin B PCR Routine  1/13/2018 12/14/2017            Who to contact     If you have questions or need follow up information about today's clinic visit or your schedule please contact [facility] directly at [phone].  Normal or non-critical lab and imaging results will be communicated to you by Skeedhart, letter or phone within 4 business days after the clinic has received the results. If you do not hear from us within 7 days, please contact the clinic through [employer]rostart or phone. If you have a critical or abnormal lab result, we will notify you by phone as soon as possible.  Submit refill requests through KokoChi or call your pharmacy and they will forward the refill request to us. Please allow 3 business days for your refill to be completed.          Additional Information About Your Visit        MyChart Information     [employer] gives you secure access to your electronic health record. If you see a primary care provider, you can also send messages to your care team and make appointments. If you have questions, please call your primary care clinic.  If you do not have a primary care provider, please call [phone] and they will assist you.        Care EveryWhere ID     This is your Care EveryWhere ID. This could be used by other organizations to access your [place]  medical records  [id number]         Blood Pressure from Last 3 Encounters:   12/14/17 115/76   12/14/17 115/83   12/07/17 107/67    Weight from Last 3 Encounters:   12/14/17 65.8 kg (145 lb 1.6 oz)   12/06/17 69.9 kg (154 lb 1.6 oz)   11/30/17 67.6 kg (149 lb)              We Performed the Following     CBC with platelets differential     Comprehensive metabolic panel          Today's Medication Changes          These changes are accurate as of: 12/14/17 12:00 PM.  If you have any questions, ask your nurse or doctor.               These medicines have changed or have updated prescriptions.        Dose/Directions    Potassium Chloride 40 MEQ/15ML (20%) Soln   This may have changed:  when to take this   Used for:  Hypokalemia        Dose:  40 mEq   Take 15 mLs (40 mEq) by mouth daily   Quantity:  450 mL   Refills:  3                Primary Care Provider Office Phone # Fax #    Jaguar Becker [phone]       [address] [address]        Equal Access to Services     [facility]: Hadii aad ku hadasho Soshu, waaxda luqadaha, qaybta kaalmada lewis, bhargav sage . So [facility] [phone].    ATENCIÓN: Si habla español, tiene a cid disposición servicios gratuitos de asistencia lingüística. [facility] [phone].    We comply with applicable federal civil rights laws and [place] laws. We do not discriminate on the basis of race, color, national origin, age, disability, sex, sexual orientation, or gender identity.            Thank you!     Thank you for choosing [facility]ED[facility]  for your care. Our goal is always to provide you with excellent care. Hearing back from our patients is one way we can continue to improve our services. Please take a few minutes to complete the written survey that you may receive in the mail after your visit with us. Thank you!             Your Updated Medication List -  Protect others around you: Learn how to safely use, store and throw away your medicines at www.disposemymeds.org.          This list is accurate as of: 12/14/17 12:00 PM.  Always use your most recent med list.                   Brand Name Dispense Instructions for use Diagnosis    amylase-lipase-protease 55740 UNITS Cpep    CREON    240 capsule    Take 2 capsules (72,000 Units) by mouth 3 times daily (with meals) And 1 capsule with each snack tid.    Diarrhea, unspecified type, Peritoneal carcinomatosis (H), Cancer of sigmoid colon (H)       diphenoxylate-atropine 2.5-0.025 MG per tablet    LOMOTIL    240 tablet    Take 1-2 tablets by mouth 4 times daily as needed for diarrhea    Diarrhea due to drug       IRON SUPPLEMENT PO      Take 325 mg by mouth 2 times daily (with meals)        loperamide 2 MG capsule    IMODIUM     Take 4 mg by mouth 4 times daily as needed for diarrhea        LORazepam 0.5 MG tablet    ATIVAN    30 tablet    Take 1 tablet (0.5 mg) by mouth every 4 hours as needed (Anxiety, Nausea/Vomiting or Sleep)    Peritoneal carcinomatosis (H), Cancer of sigmoid colon (H)       magnesium oxide 400 MG tablet    MAG-OX    60 tablet    Take 1 tablet (400 mg) by mouth 2 times daily    Hypomagnesemia       OMEPRAZOLE PO      Take 20 mg by mouth 2 times daily (before meals)        opium tincture tincture     72 mL    Take 0.6 mLs (6 mg) by mouth 4 times daily    Diarrhea, unspecified type, Cancer of sigmoid colon (H), Peritoneal carcinomatosis (H)       oxyCODONE IR 5 MG tablet    ROXICODONE    25 tablet    Take 1-2 tablets (5-10 mg) by mouth every 3 hours as needed for moderate to severe pain    Acute post-operative pain       Potassium Chloride 40 MEQ/15ML (20%) Soln     450 mL    Take 15 mLs (40 mEq) by mouth daily    Hypokalemia       Psyllium 51.7 % Pack    METAMUCIL FIBER    90 each    Take 1 packet by mouth 3 times daily    Diarrhea, unspecified type

## 2017-12-14 NOTE — PROGRESS NOTES
Infusion Nursing Note  [facility]ebas [facility]:   Chief Complaint   Patient presents with     Infusion     1 L LR     During today's [facility] appointment, orders from Dr. Ramírez were completed.  Frequency: as needed    Progress note:  Patient identification verified by name and date of birth.  Assessment completed.  Vitals recorded in Doc Flowsheets.  Patient was provided with education regarding infusion and possible side effects.  Patient verbalized understanding.     Premedications: were not ordered.  Infusion Rates: infusion given over approximately 1 hour.  Labs: were drawn per orders.   Vascular access: port accessed today.  Treatment Conditions: non-applicable.  Patient tolerated infusion: well    Discharge Plan:   Follow up plan of care with: ongoing infusions [facility]. and primary medical doctor.  Discharge instructions were reviewed with patient.  Patient/representative verbalized understanding of discharge instructions and all questions answered.  [facility] discharged [facility] in stable condition.    Suri Magana RN    Administrations This Visit     lactated ringers BOLUS 1,000 mL     Admin Date Action Dose Rate Route Administered By          12/14/2017 New Bag 1000 mL 1,000 mL/hr Intravenous Suri Magana RN                         /76

## 2017-12-14 NOTE — MR AVS SNAPSHOT
After Visit Summary   12/14/2017    Sebas Lopez    MRN: [id number]           Patient Information     Date Of Birth          1965        Visit Information        Provider Department      12/14/2017 9:15 AM Lydia Kellogg APRN [facility]        Today's Diagnoses     Peritoneal carcinomatosis (H)    -  1    Cancer of sigmoid colon (H)        Diarrhea, unspecified type        Follow-up examination following surgery           Follow-ups after your visit        Additional Services     PALLIATIVE CARE REFERRAL                 Who to contact     Please call your clinic at [phone] to:    Ask questions about your health    Make or cancel appointments    Discuss your medicines    Learn about your test results    Speak to your doctor   If you have compliments or concerns about an experience at your clinic, or if you wish to file a complaint, please contact [facility] Patient Relations at [phone] or email us at [email or web address]         Additional Information About Your Visit        MyChart Information     [employer] gives you secure access to your electronic health record. If you see a primary care provider, you can also send messages to your care team and make appointments. If you have questions, please call your primary care clinic.  If you do not have a primary care provider, please call [phone] and they will assist you.      [employer] is an electronic gateway that provides easy, online access to your medical records. With [employer], you can request a clinic appointment, read your test results, renew a prescription or communicate with your care team.     To access your existing account, please contact your [facility] or call [phone] for assistance.        Care EveryWhere ID     This is your Care EveryWhere ID. This could be used by other organizations to access your  "[facility] medical records  [id number]        Your Vitals Were     Pulse Temperature Height Pulse Oximetry BMI (Body Mass Index)       112 98.5  F (36.9  C) (Oral) 5' 9\" 97% 21.43 kg/m2        Blood Pressure from Last 3 Encounters:   12/14/17 115/76   12/14/17 115/83   12/07/17 107/67    Weight from Last 3 Encounters:   12/14/17 145 lb 1.6 oz   12/06/17 154 lb 1.6 oz   11/30/17 149 lb              We Performed the Following     PALLIATIVE CARE REFERRAL          Today's Medication Changes          These changes are accurate as of: 12/14/17  5:09 PM.  If you have any questions, ask your nurse or doctor.               These medicines have changed or have updated prescriptions.        Dose/Directions    Potassium Chloride 40 MEQ/15ML (20%) Soln   This may have changed:  when to take this   Used for:  Hypokalemia        Dose:  40 mEq   Take 15 mLs (40 mEq) by mouth daily   Quantity:  450 mL   Refills:  3                Primary Care Provider Office Phone # Fax #    Jaguar Becker [phone]       [place] [facility] [address]  [facility] [address]        Equal Access to Services     MELVIN DUNN AH: Hadii karrie reddy hadasho Soshu, waaxda luqadaha, qaybta kaalmada adeegyada, bhargav willis. So [facility] [phone].    ATENCIÓN: Si habla español, tiene a cid disposición servicios gratuitos de asistencia lingüística. [facility] [phone].    We comply with applicable federal civil rights laws and [place] laws. We do not discriminate on the basis of race, color, national origin, age, disability, sex, sexual orientation, or gender identity.            Thank you!     Thank you for choosing [facility] COLON AND RECTAL SURGERY  for your care. Our goal is always to provide you with excellent care. Hearing back from our patients is one way we can continue to improve our services. Please take a few minutes to complete the written survey that you may receive in the mail after your visit with " us. Thank you!             Your Updated Medication List - Protect others around you: Learn how to safely use, store and throw away your medicines at www.disposemymeds.org.          This list is accurate as of: 12/14/17  5:09 PM.  Always use your most recent med list.                   Brand Name Dispense Instructions for use Diagnosis    amylase-lipase-protease 81691 UNITS Cpep    CREON    240 capsule    Take 2 capsules (72,000 Units) by mouth 3 times daily (with meals) And 1 capsule with each snack tid.    Diarrhea, unspecified type, Peritoneal carcinomatosis (H), Cancer of sigmoid colon (H)       diphenoxylate-atropine 2.5-0.025 MG per tablet    LOMOTIL    240 tablet    Take 1-2 tablets by mouth 4 times daily as needed for diarrhea    Diarrhea due to drug       IRON SUPPLEMENT PO      Take 325 mg by mouth 2 times daily (with meals)        loperamide 2 MG capsule    IMODIUM     Take 4 mg by mouth 4 times daily as needed for diarrhea        LORazepam 0.5 MG tablet    ATIVAN    30 tablet    Take 1 tablet (0.5 mg) by mouth every 4 hours as needed (Anxiety, Nausea/Vomiting or Sleep)    Peritoneal carcinomatosis (H), Cancer of sigmoid colon (H)       magnesium oxide 400 MG tablet    MAG-OX    60 tablet    Take 1 tablet (400 mg) by mouth 2 times daily    Hypomagnesemia       OMEPRAZOLE PO      Take 20 mg by mouth 2 times daily (before meals)        opium tincture tincture     72 mL    Take 0.6 mLs (6 mg) by mouth 4 times daily    Diarrhea, unspecified type, Cancer of sigmoid colon (H), Peritoneal carcinomatosis (H)       oxyCODONE IR 5 MG tablet    ROXICODONE    25 tablet    Take 1-2 tablets (5-10 mg) by mouth every 3 hours as needed for moderate to severe pain    Acute post-operative pain       Potassium Chloride 40 MEQ/15ML (20%) Soln     450 mL    Take 15 mLs (40 mEq) by mouth daily    Hypokalemia       Psyllium 51.7 % Pack    METAMUCIL FIBER    90 each    Take 1 packet by mouth 3 times daily    Diarrhea, unspecified  type

## 2017-12-14 NOTE — NURSING NOTE
"Chief Complaint   Patient presents with     Surgical Followup     post op       Vitals:    12/14/17 0912   BP: 115/83   Pulse: 112   Temp: 98.5  F (36.9  C)   TempSrc: Oral   SpO2: 97%   Weight: 145 lb 1.6 oz   Height: 5' 9\"       Body mass index is 21.43 kg/(m^2).    Korin VELASQUEZ LPN                          "

## 2017-12-14 NOTE — LETTER
2017      RE: Sebas Lopez  [address]  [facility] [address]       Colon and Rectal Surgery Postoperative Clinic Note    RE: Sebas Lopez  : 1965  NADIYA: 2017    Sebas Lopez is a very pleasant 52 year old male with chronic ulcerative colitis who was found to have diffuse carcinomatosis and small bowel resection with end ileostomy was performed in July of this year.  He completed chemotherapy and on 2017 he underwent exploratory laparotomy with aborted HIPEC due to diffuse carcinomatosis with Dr. Dudley.  His postoperative course was uneventful and he was discharged home on 2017.  He presents today for follow-up.    Interval history: Sebas reports that he feels dehydrated today.  His mouth and throat are dry and this has been a sign of dehydration for him in the past.  His urine is also dark.  It he is having high outputs from his ostomy, although he is not measuring these.  He is taking tincture of opium 6 mg 4 times a day, 2 tabs of Lomotil 4 times a day, 2 tabs of Imodium 4 times a day, and Metamucil 3 times a day.  He is eating and drinking without difficulty and denies any nausea or vomiting.  He has also lost about 5 pounds in the last few days.  He has had a small amount of drainage from his wound that is clear.  He denies any purulent drainage.  He denies any fevers or chills.    Assessment/Plan:  52 year old male status post exploratory laparotomy with aborted HIPEC due to diffuse carcinomatosis with Dr. Dudley.  Midline incision with local dermatitis at the Dermabond sites.  Discussed with Dr. Snell and this was removed today.  Midline incision otherwise well intact.  He can apply an external gauze dressing to this.  Stoma is well everted and there is actually some thick stool in the bag currently.  Checked a C. difficile and this was normal.  He does have high outputs at baseline and is taking maximum doses of tincture of opium, Lomotil, Imodium,  and Metamucil.  He felt better after a liter fluid bolus today.  Discussed with Dr. Snell and we will get him set up with palliative care for close management.  White count is slightly elevated but patient is afebrile and otherwise asymptomatic.  We will continue to monitor.  We will have him follow-up with Dr. Dudley in clinic in the next 2-3 weeks as well.  Encouraged him to contact the clinic in the meantime with any questions or concerns.  Patient's questions were answered to his stated satisfaction and he is in agreement with this plan.    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      HERNIA REPAIR       INSERT PORT VASCULAR ACCESS Right 8/10/2017    Procedure: INSERT PORT VASCULAR ACCESS;  Single Lumen Right Chest Power Port;  Surgeon: Malena Andrew PA-C;  [facility]     LAPAROSCOPY DIAGNOSTIC (GENERAL) N/A 12/6/2017    Procedure: LAPAROSCOPY DIAGNOSTIC (GENERAL);  Diagnostic Laparoscopy, Exploratory Laparotomy Anesthesia Block ;  Surgeon: Rob Dudley MD;  [facility]     LAPAROTOMY EXPLORATORY N/A 12/6/2017    Procedure: LAPAROTOMY EXPLORATORY;;  Surgeon: Rob Dudley MD;  Location: [facility]     ORTHOPEDIC SURGERY Left     HIP ARTHROPLASTY       Problem list:  Patient Active Problem List    Diagnosis Date Noted      Diarrhea, unspecified type 09/22/2017     Priority: Medium     Hypokalemia 09/22/2017     Priority: Medium     Iron deficiency anemia due to chronic blood loss 08/04/2017     Priority: Medium     Peritoneal carcinomatosis (H) 08/03/2017     Priority: Medium     Cancer of sigmoid colon (H) 08/03/2017     Priority: Medium       Medications:  Current Outpatient Prescriptions   Medication Sig Dispense Refill     oxyCODONE IR (ROXICODONE) 5 MG tablet Take 1-2 tablets (5-10 mg) by mouth every 3 hours as needed for moderate to severe pain 25 tablet 0     opium tincture tincture Take 0.6 mLs (6 mg) by mouth 4 times daily 72 mL 0     diphenoxylate-atropine (LOMOTIL) 2.5-0.025 MG per tablet Take 1-2 tablets by mouth 4 times daily as needed for diarrhea 240 tablet 5     amylase-lipase-protease (CREON) 66306 UNITS CPEP Take 2 capsules (72,000 Units) by mouth 3 times daily (with meals) And 1 capsule with each snack tid. 240 capsule 3     LORazepam (ATIVAN) 0.5 MG tablet Take 1 tablet (0.5 mg) by mouth every 4 hours as needed (Anxiety, Nausea/Vomiting or Sleep) 30 tablet 5     magnesium oxide (MAG-OX) 400 MG tablet Take 1 tablet (400 mg) by mouth 2 times daily 60 tablet 1     OMEPRAZOLE PO Take 20 mg by mouth 2 times daily (before meals)       Potassium Chloride 40 MEQ/15ML (20%) SOLN Take 15 mLs (40 mEq) by mouth daily (Patient taking differently: Take 40 mEq by mouth every morning ) 450 mL 3     Psyllium (METAMUCIL FIBER) 51.7 % PACK Take 1 packet by mouth 3 times daily 90 each 3     loperamide (IMODIUM) 2 MG capsule Take 4 mg by mouth 4 times daily as needed for diarrhea        Ferrous Sulfate (IRON SUPPLEMENT PO) Take 325 mg by mouth 2 times daily (with meals)          Allergies:  No Known Allergies    Family history:  Family History   Problem Relation Age of Onset     Hypertension Father      DIABETES Father        Social history:  Social History   Substance Use Topics     Smoking status: Never Smoker     Smokeless tobacco:  "Never Used     Alcohol use 3.0 oz/week     5 Cans of beer per week      Comment: none for last 4 months     Marital status: single.    Nursing Notes:   Bang Korin, LPN  12/14/2017  9:13 AM  Signed  Chief Complaint   Patient presents with     Surgical Followup     post op       Vitals:    12/14/17 0912   BP: 115/83   Pulse: 112   Temp: 98.5  F (36.9  C)   TempSrc: Oral   SpO2: 97%   Weight: 145 lb 1.6 oz   Height: 5' 9\"       Body mass index is 21.43 kg/(m^2).    Korin VELASQUEZARYAN                             Physical Examination:  /83  Pulse 112  Temp 98.5  F (36.9  C) (Oral)  Ht 5' 9\"  Wt 145 lb 1.6 oz  SpO2 97%  BMI 21.43 kg/m2  General: Alert, oriented, in no acute distress, sitting comfortably  HEENT: Mucous membrane is moist  Abdomen: Soft, nondistended, nontender on palpation.  Midline incision well approximated but with local irritation at the Dermabond sites.  Dermabond was removed.  External gauze dressing was applied.  No surrounding erythema and no purulent drainage present.  Stoma well everted with thick stool in the bag and pouch with good seal.    Total face to face time was 30 minutes, >50% counseling.    ALEXA Cortez, NP-C  Colon and Rectal Surgery  [facility]    This note was created using speech recognition software and may contain unintended word substitutions.        ALEXA Cortez CNP      "

## 2017-12-14 NOTE — PROGRESS NOTES
Colon and Rectal Surgery Postoperative Clinic Note    RE: Sebas Lopez  : 1965  NADIYA: 2017    Sebas Lopez is a very pleasant 52 year old male with chronic ulcerative colitis who was found to have diffuse carcinomatosis and small bowel resection with end ileostomy was performed in July of this year.  He completed chemotherapy and on 2017 he underwent exploratory laparotomy with aborted HIPEC due to diffuse carcinomatosis with Dr. Dudley.  His postoperative course was uneventful and he was discharged home on 2017.  He presents today for follow-up.    Interval history: Sebas reports that he feels dehydrated today.  His mouth and throat are dry and this has been a sign of dehydration for him in the past.  His urine is also dark.  It he is having high outputs from his ostomy, although he is not measuring these.  He is taking tincture of opium 6 mg 4 times a day, 2 tabs of Lomotil 4 times a day, 2 tabs of Imodium 4 times a day, and Metamucil 3 times a day.  He is eating and drinking without difficulty and denies any nausea or vomiting.  He has also lost about 5 pounds in the last few days.  He has had a small amount of drainage from his wound that is clear.  He denies any purulent drainage.  He denies any fevers or chills.    Assessment/Plan:  52 year old male status post exploratory laparotomy with aborted HIPEC due to diffuse carcinomatosis with Dr. Dudley.  Midline incision with local dermatitis at the Dermabond sites.  Discussed with Dr. Snell and this was removed today.  Midline incision otherwise well intact.  He can apply an external gauze dressing to this.  Stoma is well everted and there is actually some thick stool in the bag currently.  Checked a C. difficile and this was normal.  He does have high outputs at baseline and is taking maximum doses of tincture of opium, Lomotil, Imodium, and Metamucil.  He felt better after a liter fluid bolus today.  Discussed with   [place] and we will get him set up with palliative care for close management.  White count is slightly elevated but patient is afebrile and otherwise asymptomatic.  We will continue to monitor.  We will have him follow-up with Dr. Dudley in clinic in the next 2-3 weeks as well.  Encouraged him to contact the clinic in the meantime with any questions or concerns.  Patient's questions were answered to his stated satisfaction and he is in agreement with this plan.    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      HERNIA REPAIR       INSERT PORT VASCULAR ACCESS Right 8/10/2017    Procedure: INSERT PORT VASCULAR ACCESS;  Single Lumen Right Chest Power Port;  Surgeon: Malena Andrew PA-C;  [facility]     LAPAROSCOPY DIAGNOSTIC (GENERAL) N/A 12/6/2017    Procedure: LAPAROSCOPY DIAGNOSTIC (GENERAL);  Diagnostic Laparoscopy, Exploratory Laparotomy Anesthesia Block ;  Surgeon: Rob Dudley MD;  [facility]     LAPAROTOMY EXPLORATORY N/A 12/6/2017    Procedure: LAPAROTOMY EXPLORATORY;;  Surgeon: Rob Dudley MD;  [facility]     ORTHOPEDIC SURGERY Left     HIP ARTHROPLASTY       Problem list:  Patient Active Problem List    Diagnosis Date Noted     Diarrhea, unspecified type 09/22/2017     Priority: Medium     Hypokalemia 09/22/2017      Priority: Medium     Iron deficiency anemia due to chronic blood loss 08/04/2017     Priority: Medium     Peritoneal carcinomatosis (H) 08/03/2017     Priority: Medium     Cancer of sigmoid colon (H) 08/03/2017     Priority: Medium       Medications:  Current Outpatient Prescriptions   Medication Sig Dispense Refill     oxyCODONE IR (ROXICODONE) 5 MG tablet Take 1-2 tablets (5-10 mg) by mouth every 3 hours as needed for moderate to severe pain 25 tablet 0     opium tincture tincture Take 0.6 mLs (6 mg) by mouth 4 times daily 72 mL 0     diphenoxylate-atropine (LOMOTIL) 2.5-0.025 MG per tablet Take 1-2 tablets by mouth 4 times daily as needed for diarrhea 240 tablet 5     amylase-lipase-protease (CREON) 13765 UNITS CPEP Take 2 capsules (72,000 Units) by mouth 3 times daily (with meals) And 1 capsule with each snack tid. 240 capsule 3     LORazepam (ATIVAN) 0.5 MG tablet Take 1 tablet (0.5 mg) by mouth every 4 hours as needed (Anxiety, Nausea/Vomiting or Sleep) 30 tablet 5     magnesium oxide (MAG-OX) 400 MG tablet Take 1 tablet (400 mg) by mouth 2 times daily 60 tablet 1     OMEPRAZOLE PO Take 20 mg by mouth 2 times daily (before meals)       Potassium Chloride 40 MEQ/15ML (20%) SOLN Take 15 mLs (40 mEq) by mouth daily (Patient taking differently: Take 40 mEq by mouth every morning ) 450 mL 3     Psyllium (METAMUCIL FIBER) 51.7 % PACK Take 1 packet by mouth 3 times daily 90 each 3     loperamide (IMODIUM) 2 MG capsule Take 4 mg by mouth 4 times daily as needed for diarrhea        Ferrous Sulfate (IRON SUPPLEMENT PO) Take 325 mg by mouth 2 times daily (with meals)          Allergies:  No Known Allergies    Family history:  Family History   Problem Relation Age of Onset     Hypertension Father      DIABETES Father        Social history:  Social History   Substance Use Topics     Smoking status: Never Smoker     Smokeless tobacco: Never Used     Alcohol use 3.0 oz/week     5 Cans of beer per week      Comment: none for  "last 4 months     Marital status: single.    Nursing Notes:   Korin Cuenca, LPN  12/14/2017  9:13 AM  Signed  Chief Complaint   Patient presents with     Surgical Followup     post op       Vitals:    12/14/17 0912   BP: 115/83   Pulse: 112   Temp: 98.5  F (36.9  C)   TempSrc: Oral   SpO2: 97%   Weight: 145 lb 1.6 oz   Height: 5' 9\"       Body mass index is 21.43 kg/(m^2).    Korin VELASQUEZ MARKSHERRIE                             Physical Examination:  /83  Pulse 112  Temp 98.5  F (36.9  C) (Oral)  Ht 5' 9\"  Wt 145 lb 1.6 oz  SpO2 97%  BMI 21.43 kg/m2  General: Alert, oriented, in no acute distress, sitting comfortably  HEENT: Mucous membrane is moist  Abdomen: Soft, nondistended, nontender on palpation.  Midline incision well approximated but with local irritation at the Dermabond sites.  Dermabond was removed.  External gauze dressing was applied.  No surrounding erythema and no purulent drainage present.  Stoma well everted with thick stool in the bag and pouch with good seal.    Total face to face time was 30 minutes, >50% counseling.    ALEXA Cortez, NP-C  Colon and Rectal Surgery  [facility]    This note was created using speech recognition software and may contain unintended word substitutions.      "

## 2017-12-20 DIAGNOSIS — C18.7 CANCER OF SIGMOID COLON (H): ICD-10-CM

## 2017-12-20 DIAGNOSIS — R19.7 DIARRHEA, UNSPECIFIED TYPE: ICD-10-CM

## 2017-12-20 DIAGNOSIS — C78.6 PERITONEAL CARCINOMATOSIS (H): ICD-10-CM

## 2017-12-21 ENCOUNTER — OFFICE VISIT (OUTPATIENT)
Dept: PALLIATIVE CARE | Facility: CLINIC | Age: 52
End: 2017-12-21
Attending: INTERNAL MEDICINE
Payer: COMMERCIAL

## 2017-12-21 VITALS
TEMPERATURE: 97.3 F | BODY MASS INDEX: 21.52 KG/M2 | OXYGEN SATURATION: 100 % | RESPIRATION RATE: 16 BRPM | DIASTOLIC BLOOD PRESSURE: 81 MMHG | HEART RATE: 100 BPM | SYSTOLIC BLOOD PRESSURE: 125 MMHG | HEIGHT: 69 IN | WEIGHT: 145.31 LBS

## 2017-12-21 DIAGNOSIS — C78.6 PERITONEAL CARCINOMATOSIS (H): Primary | ICD-10-CM

## 2017-12-21 DIAGNOSIS — R19.7 DIARRHEA, UNSPECIFIED TYPE: ICD-10-CM

## 2017-12-21 PROCEDURE — 99213 OFFICE O/P EST LOW 20 MIN: CPT | Mod: ZF

## 2017-12-21 PROCEDURE — 99215 OFFICE O/P EST HI 40 MIN: CPT | Mod: GC | Performed by: INTERNAL MEDICINE

## 2017-12-21 RX ORDER — SCOLOPAMINE TRANSDERMAL SYSTEM 1 MG/1
1 PATCH, EXTENDED RELEASE TRANSDERMAL
Qty: 10 PATCH | Refills: 3 | Status: SHIPPED | OUTPATIENT
Start: 2017-12-21 | End: 2018-01-01

## 2017-12-21 ASSESSMENT — PAIN SCALES - GENERAL: PAINLEVEL: NO PAIN (0)

## 2017-12-21 NOTE — LETTER
12/21/2017       RE: Sebas Lopez  [address] [place] [address]     Dear Colleague,    Thank you for referring your patient, Sebas Lopez, to the [facility] at [facility]. Please see a copy of my visit note below.    Palliative Care Outpatient Clinic Consultation Note    Patient:  Sebas Lopez    Chief Complaint:   Sebas Lopez 52 year old male with history of UC who is presenting to the palliative medicine clinic today by himself at the request of his surgical oncologist Dr Dudley for a palliative care consultation secondary to metastatic colon cancer.   The patient's primary care provider is:  Jaguar Becker     History of Present Illness:  May 2017 patient had a bowel obstruction with CT showing sigmoid thickening and abdominal LN.  Colonoscopy showed obstructing sigmoid mass with biopsy showing adenocarcinoma.  Due to obstruction he had an exlap showing diffuse carcinomatosis.   He had an ileostomy and has had high output with a difficult time keeping his weight up and staying hydrated.  In an effort to slow transit he has taken omeprazole bid, opium tincture 0.6 mL qid, diphenoxylate, and loperamide 4 mg qid which have had some small amount of help with his output (200 oz per day to 80 oz/day).  He empties his ostomy about every 2 hours, and is wondering if there are any recommendations we had about slowing down transit further.  He was trying to eat more starchy food and psyllium powder.      He completed chemotherapy (completed 6 treatments every other week with FOLFOX) 1 month ago, and had ex lap with noted reduction in size, but that the residual tumor was not amenable to resection or HIPEC.  Oncology felt the tumor over his small intestine would be better treated with further chemotherapy with re-eval in 2-3 weeks.        His chemotherapy was complicated by neuropathy which has improved since completing his  FOLFOX therapy.      He was taking oxycodone after his ex lap for post-surgical pain, but was not taking it much.  He did not some mild benefit in his stool output.     He has been looking into alternative therapies like high dose Vitamin C, as he is not a fan of chemotherapy.  He also has looked into ketogenic diet.      He has had chronic loose stools from his UC (diagnosed ~1988), and had not returned to the doctor for about 15 years.  He was controlling his symptoms with metamucil as needed.  He didn't feel he needed follow up with gastroenterology or a medical doctor for the past 15-20 years as he felt his symptoms were tolerable.  He had been on prednisone and sulfasalazine in the past, but stopped these due to lack of affordability. He had been off these meds for the past 15+ years.  He had no N/V, hematochezia, or abdominal pain over that time, and only had diarrhea.      Patient's Disease Understanding: Good understanding    Coping:  He believes his cancer is not curable, and is trying to get him more time.  He feels you have to play the hands your dealth, and has support from her girlfriend Bessie, his parents, and friends.  He has not discussed prognosis with his oncology team, but feels that he would like to know more about his overall prognosis.      Social History  Living Situation: Lives in [place], [place] in house with Bessie (KEYSHAWN)  Children: no  Actual/Potential Caregiver(s): Bessie would be, but is not currently a caregiver  Support System: Friends/family.  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  Non-smoker.  No other drug use.  Financial Concerns: Doing okay at this time, with work covering insurange and long term disability.  Spiritual Background: Not Pentecostalism, but believes in God.  Previously confirmed in Islam Sabianist.   Spiritual Concerns/Needs: Not at this  "time.  Social History   Substance Use Topics     Smoking status: Never Smoker     Smokeless tobacco: Never Used     Alcohol use 3.0 oz/week     5 Cans of beer per week      Comment: none for last 4 months     Family History  Patient's Involvement with Prior History of Serious Illness in Family:   Dad has HTN, and Mom has a condition similar to Raynauds.  Dad's sister recently  from an unknown type of cancer, and dad's brother also  from lung cancer recently    Advance Care Planning:  Advance Directive:   Not on file  Health Care Agent Contact Information: Mother Ericka Lopez would be [facility], [phone]  POLST:   Not on file    REVIEW OF SYSTEMS:   ROS: 10 point ROS neg other than the symptoms noted above in the HPI and here:  Palliative Symptom Review (0=no symptom/no concern, 1=mild, 2=moderate, 3=severe):      Pain: 1      Fatigue: 1      Nausea: 0      Constipation: 0      Diarrhea: 3      Depressive Symptoms: 0      Anxiety: 0      Drowsiness: 1      Poor Appetite: 1      Shortness of Breath: 0      Insomnia: 1      Other: 0      Overall (0 good/no concerns, 3 very poor):  2           Physical Exam:   Vitals were reviewed  /81 (BP Location: Right arm, Patient Position: Chair, Cuff Size: Adult Regular)  Pulse 100  Temp 97.3  F (36.3  C) (Oral)  Resp 16  Ht 1.753 m (5' 9\")  Wt 65.9 kg (145 lb 5 oz)  SpO2 100%  BMI 21.46 kg/m2  General: Alert, comfortable appearing male  ENT: MMM. No ulcerations or plaques.   Cardiac: Normal rate, regular rhythm, no murmurs.  Pulses +2 radial/DP    Resp: CTAB, unlabored on room air without w/r/r   Abd: Ileostomy in place nearly full with liquid stool with floating fatty secretions in bag.  Midline incision healing well without signs of erythema/TTP/warmth.  Soft, non-distended, non-tender to palpation, active bowel sounds.   Ext: Warm and well perfused.  No pedal edema.   Neuro: No facial asymmetry.  Spontaneous movements grossly non-focal.  Normal gait. "   Neuropsych: Alert, appropriately interactive, full affect.     Data Reviewed:  LABS:   Lab Results   Component Value Date    WBC 13.5 (H) 12/14/2017    HGB 10.6 (L) 12/14/2017    HCT 32.1 (L) 12/14/2017     (H) 12/14/2017     12/14/2017    POTASSIUM 3.9 12/14/2017    CHLORIDE 99 12/14/2017    CO2 25 12/14/2017    BUN 14 12/14/2017    CR 0.95 12/14/2017     (H) 12/14/2017    AST 28 12/14/2017    ALT 24 12/14/2017    ALKPHOS 172 (H) 12/14/2017    BILITOTAL 0.4 12/14/2017    INR 1.12 08/10/2017     IMAGING:   CT CAP (11/6/2017)  1. Revisualization of the sigmoid colon mass with adjacent mesenteric  adenopathy, not significantly changed.  2. No significant change in mesenteric nodules.  3. No evidence of metastatic disease in the liver or chest.  4. Postsurgical changes of loop ileostomy. No evidence of obstruction.  5. No significant change in small indeterminate cysts in the kidneys,  continued attention follow-up.    Images personally reviewed: Yes, no additions to the above interpretation by Radiologist    MN  - Reviewed, consistent dosages and use of medications.      Impressions:  Palliative Performance Score:  80%  Decision Making Capacity:  Patient is decisional    Diarrhea with increased motility thru ileostomy  At this time we discussed the extensive bowel regimen he has been slowly up-titrating in the hopes to get control of his diarrheal symptoms.  At this time secretory diarrhea seems to be playing the major role in his symptoms.  At this time will try scopolamine patch in the hopes of decreasing secretions.  Could also consider somatostatin analogue or oral IR oxycodone to help slow bowel transit.      Recommendations & Counseling:  We will discuss octreotide versus systemic oxycodone to help with decreased motility of diarrhea thru ileostomy with Dr Dudley  We started scopolamine patch transdermal to help dry up secretions  Discussed possible sedation that may occur with the  patch.     RTC 6 weeks for a follow up.       Additional History Reviewed:   No Known Allergies  Current Outpatient Prescriptions   Medication Sig Dispense Refill     opium tincture tincture Take 0.6 mLs (6 mg) by mouth 4 times daily 72 mL 0     diphenoxylate-atropine (LOMOTIL) 2.5-0.025 MG per tablet Take 1-2 tablets by mouth 4 times daily as needed for diarrhea 240 tablet 5     amylase-lipase-protease (CREON) 70008 UNITS CPEP Take 2 capsules (72,000 Units) by mouth 3 times daily (with meals) And 1 capsule with each snack tid. 240 capsule 3     LORazepam (ATIVAN) 0.5 MG tablet Take 1 tablet (0.5 mg) by mouth every 4 hours as needed (Anxiety, Nausea/Vomiting or Sleep) 30 tablet 5     magnesium oxide (MAG-OX) 400 MG tablet Take 1 tablet (400 mg) by mouth 2 times daily 60 tablet 1     OMEPRAZOLE PO Take 20 mg by mouth 2 times daily (before meals)       Potassium Chloride 40 MEQ/15ML (20%) SOLN Take 15 mLs (40 mEq) by mouth daily (Patient taking differently: Take 40 mEq by mouth every morning ) 450 mL 3     Psyllium (METAMUCIL FIBER) 51.7 % PACK Take 1 packet by mouth 3 times daily 90 each 3     loperamide (IMODIUM) 2 MG capsule Take 4 mg by mouth 4 times daily as needed for diarrhea        Ferrous Sulfate (IRON SUPPLEMENT PO) Take 325 mg by mouth 2 times daily (with meals)        oxyCODONE IR (ROXICODONE) 5 MG tablet Take 1-2 tablets (5-10 mg) by mouth every 3 hours as needed for moderate to severe pain (Patient not taking: Reported on 12/21/2017) 25 tablet 0     Past Medical History:   Diagnosis Date     Adenocarcinoma of sigmoid colon (H)     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      HERNIA REPAIR       INSERT PORT VASCULAR ACCESS Right 8/10/2017    Procedure: INSERT PORT VASCULAR ACCESS;  Single Lumen Right Chest Power Port;  Surgeon: Malean Andrew PA-C;  [facility]     LAPAROSCOPY DIAGNOSTIC (GENERAL) N/A 12/6/2017    Procedure: LAPAROSCOPY DIAGNOSTIC (GENERAL);  Diagnostic Laparoscopy, Exploratory Laparotomy Anesthesia Block ;  Surgeon: Rob Dudley MD;  Location: [facility]     LAPAROTOMY EXPLORATORY N/A 12/6/2017    Procedure: LAPAROTOMY EXPLORATORY;;  Surgeon: Rob Dudley MD;  Location: [facility]     ORTHOPEDIC SURGERY Left     HIP ARTHROPLASTY     Family History   Problem Relation Age of Onset     Hypertension Father      DIABETES Father      Thank you for involving me in the care of this patient.     Sukhdeep Mota DO / Palliative Medicine Fellow / Pager [phone] / [facility] Inpatient Team Consult Pager [phone] (answered 8am-430pm M-F) / After-Hours Answering Service [phone]      Attending Note:  Patient seen and evaluated with Dr Mota and I agree with/confirm his findings/recs in this note.     Thank you for involving us in the patient's care.   Lorenzo Trujillo MD / Palliative Medicine / Pager [phone] / [facility] Inpatient Team Consult Pager [phone] (answered 8am-430pm M-F) - ok to text page via [employer] / After-Hours Answering Service [phone] / Palliative Clinic in the [facility] [place] [phone] (scheduling); [phone] (triage).

## 2017-12-21 NOTE — NURSING NOTE
"Oncology Rooming Note    December 21, 2017 2:43 PM   Sebas Lopez is a 52 year old male who presents for:    Chief Complaint   Patient presents with     Oncology Clinic Visit     New patient: Colon Ca     Initial Vitals: /81 (BP Location: Right arm, Patient Position: Chair, Cuff Size: Adult Regular)  Pulse 100  Temp 97.3  F (36.3  C) (Oral)  Resp 16  Ht 1.753 m (5' 9\")  Wt 65.9 kg (145 lb 5 oz)  SpO2 100%  BMI 21.46 kg/m2 Estimated body mass index is 21.46 kg/(m^2) as calculated from the following:    Height as of this encounter: 1.753 m (5' 9\").    Weight as of this encounter: 65.9 kg (145 lb 5 oz). Body surface area is 1.79 meters squared.  No Pain (0) Comment: Data Unavailable   No LMP for male patient.  Allergies reviewed: Yes  Medications reviewed: Yes    Medications: Medication refills not needed today.  Pharmacy name entered into [facility]: [facility] - [place] - [address]    Clinical concerns: new patient Dr. Mota was NOT notified.    10 minutes for nursing intake (face to face time)     Mike Bridges CMA              "

## 2017-12-21 NOTE — PATIENT INSTRUCTIONS
We will discuss octreotide versus systemic oxycodone to help with decreased motility of diarrhea thru ileostomy  We started scopolamine patch transdermal to help dry up secretions  Discussed possible sedation that may occur with the patch.     Our RN Oriana will call you next week to check on how the patch is working, and if it is still not working we may increase the dose.   Walk in

## 2017-12-21 NOTE — PROGRESS NOTES
Palliative Care Outpatient Clinic Consultation Note    Patient:  Sebas Lopez    Chief Complaint:   Sebas Lopez 52 year old male with history of UC who is presenting to the palliative medicine clinic today by himself at the request of his surgical oncologist Dr Dudley for a palliative care consultation secondary to metastatic colon cancer.   The patient's primary care provider is:  Jaguar Becker     History of Present Illness:  May 2017 patient had a bowel obstruction with CT showing sigmoid thickening and abdominal LN.  Colonoscopy showed obstructing sigmoid mass with biopsy showing adenocarcinoma.  Due to obstruction he had an exlap showing diffuse carcinomatosis.   He had an ileostomy and has had high output with a difficult time keeping his weight up and staying hydrated.  In an effort to slow transit he has taken omeprazole bid, opium tincture 0.6 mL qid, diphenoxylate, and loperamide 4 mg qid which have had some small amount of help with his output (200 oz per day to 80 oz/day).  He empties his ostomy about every 2 hours, and is wondering if there are any recommendations we had about slowing down transit further.  He was trying to eat more starchy food and psyllium powder.      He completed chemotherapy (completed 6 treatments every other week with FOLFOX) 1 month ago, and had ex lap with noted reduction in size, but that the residual tumor was not amenable to resection or HIPEC.  Oncology felt the tumor over his small intestine would be better treated with further chemotherapy with re-eval in 2-3 weeks.        His chemotherapy was complicated by neuropathy which has improved since completing his FOLFOX therapy.      He was taking oxycodone after his ex lap for post-surgical pain, but was not taking it much.  He did not some mild benefit in his stool output.     He has been looking into alternative therapies like high dose Vitamin C, as he is not a fan of chemotherapy.  He also has looked  into ketogenic diet.      He has had chronic loose stools from his UC (diagnosed ~1988), and had not returned to the doctor for about 15 years.  He was controlling his symptoms with metamucil as needed.  He didn't feel he needed follow up with gastroenterology or a medical doctor for the past 15-20 years as he felt his symptoms were tolerable.  He had been on prednisone and sulfasalazine in the past, but stopped these due to lack of affordability. He had been off these meds for the past 15+ years.  He had no N/V, hematochezia, or abdominal pain over that time, and only had diarrhea.      Patient's Disease Understanding: Good understanding    Coping:  He believes his cancer is not curable, and is trying to get him more time.  He feels you have to play the hands your dealth, and has support from her girlfriend Bessie, his parents, and friends.  He has not discussed prognosis with his oncology team, but feels that he would like to know more about his overall prognosis.      Social History  Living Situation: Lives in [place], [place] in house with Bessie (KEYSHAWN)  Children: no  Actual/Potential Caregiver(s): Bessie would be, but is not currently a caregiver  Support System: Friends/family.  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  Non-smoker.  No other drug use.  Financial Concerns: Doing okay at this time, with work covering insurange and long term disability.  Spiritual Background: Not Sabianist, but believes in God.  Previously confirmed in [employer].   Spiritual Concerns/Needs: Not at this time.  Social History   Substance Use Topics     Smoking status: Never Smoker     Smokeless tobacco: Never Used     Alcohol use 3.0 oz/week     5 Cans of beer per week      Comment: none for last 4 months     Family History  Patient's Involvement with Prior History of Serious Illness in Family:   Dad has  "HTN, and Mom has a condition similar to Raynauds.  Dad's sister recently  from an unknown type of cancer, and dad's brother also  from lung cancer recently    Advance Care Planning:  Advance Directive:   Not on file  Health Care Agent Contact Information: Mother Ericka Lopez would be [facility], [phone]  POLST:   Not on file    REVIEW OF SYSTEMS:   ROS: 10 point ROS neg other than the symptoms noted above in the HPI and here:  Palliative Symptom Review (0=no symptom/no concern, 1=mild, 2=moderate, 3=severe):      Pain: 1      Fatigue: 1      Nausea: 0      Constipation: 0      Diarrhea: 3      Depressive Symptoms: 0      Anxiety: 0      Drowsiness: 1      Poor Appetite: 1      Shortness of Breath: 0      Insomnia: 1      Other: 0      Overall (0 good/no concerns, 3 very poor):  2           Physical Exam:   Vitals were reviewed  /81 (BP Location: Right arm, Patient Position: Chair, Cuff Size: Adult Regular)  Pulse 100  Temp 97.3  F (36.3  C) (Oral)  Resp 16  Ht 1.753 m (5' 9\")  Wt 65.9 kg (145 lb 5 oz)  SpO2 100%  BMI 21.46 kg/m2  General: Alert, comfortable appearing male  ENT: MMM. No ulcerations or plaques.   Cardiac: Normal rate, regular rhythm, no murmurs.  Pulses +2 radial/DP    Resp: CTAB, unlabored on room air without w/r/r   Abd: Ileostomy in place nearly full with liquid stool with floating fatty secretions in bag.  Midline incision healing well without signs of erythema/TTP/warmth.  Soft, non-distended, non-tender to palpation, active bowel sounds.   Ext: Warm and well perfused.  No pedal edema.   Neuro: No facial asymmetry.  Spontaneous movements grossly non-focal.  Normal gait.   Neuropsych: Alert, appropriately interactive, full affect.     Data Reviewed:  LABS:   Lab Results   Component Value Date    WBC 13.5 (H) 2017    HGB 10.6 (L) 2017    HCT 32.1 (L) 2017     (H) 2017     2017    POTASSIUM 3.9 2017    CHLORIDE 99 2017    " CO2 25 12/14/2017    BUN 14 12/14/2017    CR 0.95 12/14/2017     (H) 12/14/2017    AST 28 12/14/2017    ALT 24 12/14/2017    ALKPHOS 172 (H) 12/14/2017    BILITOTAL 0.4 12/14/2017    INR 1.12 08/10/2017     IMAGING:   CT CAP (11/6/2017)  1. Revisualization of the sigmoid colon mass with adjacent mesenteric  adenopathy, not significantly changed.  2. No significant change in mesenteric nodules.  3. No evidence of metastatic disease in the liver or chest.  4. Postsurgical changes of loop ileostomy. No evidence of obstruction.  5. No significant change in small indeterminate cysts in the kidneys,  continued attention follow-up.    Images personally reviewed: Yes, no additions to the above interpretation by Radiologist    MN  - Reviewed, consistent dosages and use of medications.      Impressions:  Palliative Performance Score:  80%  Decision Making Capacity:  Patient is decisional    Diarrhea with increased motility thru ileostomy  At this time we discussed the extensive bowel regimen he has been slowly up-titrating in the hopes to get control of his diarrheal symptoms.  At this time secretory diarrhea seems to be playing the major role in his symptoms.  At this time will try scopolamine patch in the hopes of decreasing secretions.  Could also consider somatostatin analogue or oral IR oxycodone to help slow bowel transit.      Recommendations & Counseling:  We will discuss octreotide versus systemic oxycodone to help with decreased motility of diarrhea thru ileostomy with Dr Dudley  We started scopolamine patch transdermal to help dry up secretions  Discussed possible sedation that may occur with the patch.     RTC 6 weeks for a follow up.     Thank you for involving me in the care of this patient.     Sukhdeep Mota DO / Palliative Medicine Fellow / Pager [phone] / [facility] Inpatient Team Consult Pager [phone] (answered 8am-430pm M-F) / After-Hours Answering Service [phone]      Attending Note:   Patient seen and evaluated with Dr Mota and I agree with/confirm his findings/recs in this note.     Thank you for involving us in the patient's care.   Lorenzo Trujillo MD / Palliative Medicine / Pager [phone] / [facility] Inpatient Team Consult Pager [phone] (answered 8am-430pm M-F) - ok to text page via [employer] / After-Hours Answering Service [phone] / Palliative Clinic in the [facility] at the [facility] - [phone] (scheduling); [phone] (triage).      Additional History Reviewed:   No Known Allergies  Current Outpatient Prescriptions   Medication Sig Dispense Refill     opium tincture tincture Take 0.6 mLs (6 mg) by mouth 4 times daily 72 mL 0     diphenoxylate-atropine (LOMOTIL) 2.5-0.025 MG per tablet Take 1-2 tablets by mouth 4 times daily as needed for diarrhea 240 tablet 5     amylase-lipase-protease (CREON) 73792 UNITS CPEP Take 2 capsules (72,000 Units) by mouth 3 times daily (with meals) And 1 capsule with each snack tid. 240 capsule 3     LORazepam (ATIVAN) 0.5 MG tablet Take 1 tablet (0.5 mg) by mouth every 4 hours as needed (Anxiety, Nausea/Vomiting or Sleep) 30 tablet 5     magnesium oxide (MAG-OX) 400 MG tablet Take 1 tablet (400 mg) by mouth 2 times daily 60 tablet 1     OMEPRAZOLE PO Take 20 mg by mouth 2 times daily (before meals)       Potassium Chloride 40 MEQ/15ML (20%) SOLN Take 15 mLs (40 mEq) by mouth daily (Patient taking differently: Take 40 mEq by mouth every morning ) 450 mL 3     Psyllium (METAMUCIL FIBER) 51.7 % PACK Take 1 packet by mouth 3 times daily 90 each 3     loperamide (IMODIUM) 2 MG capsule Take 4 mg by mouth 4 times daily as needed for diarrhea        Ferrous Sulfate (IRON SUPPLEMENT PO) Take 325 mg by mouth 2 times daily (with meals)        oxyCODONE IR (ROXICODONE) 5 MG tablet Take 1-2 tablets (5-10 mg) by mouth every 3 hours as needed for moderate to severe pain (Patient not taking: Reported on 12/21/2017) 25 tablet 0     Past Medical History:    Diagnosis Date     Adenocarcinoma of sigmoid colon (H)     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      HERNIA REPAIR       INSERT PORT VASCULAR ACCESS Right 8/10/2017    Procedure: INSERT PORT VASCULAR ACCESS;  Single Lumen Right Chest Power Port;  Surgeon: Malena Andrew PA-C;  [facility]     LAPAROSCOPY DIAGNOSTIC (GENERAL) N/A 12/6/2017    Procedure: LAPAROSCOPY DIAGNOSTIC (GENERAL);  Diagnostic Laparoscopy, Exploratory Laparotomy Anesthesia Block ;  Surgeon: Rob Dudley MD;  [facility]     LAPAROTOMY EXPLORATORY N/A 12/6/2017    Procedure: LAPAROTOMY EXPLORATORY;;  Surgeon: Rob Dudley MD;  [facility]     ORTHOPEDIC SURGERY Left     HIP ARTHROPLASTY     Family History   Problem Relation Age of Onset     Hypertension Father      DIABETES Father

## 2017-12-21 NOTE — MR AVS SNAPSHOT
After Visit Summary   12/21/2017    Sebas Lopez    MRN: [id number]           Patient Information     Date Of Birth          1965        Visit Information        Provider Department      12/21/2017 3:00 PM Sukhdeep Mota MD [facility]        Today's Diagnoses     Peritoneal carcinomatosis (H)    -  1    Diarrhea, unspecified type          Care Instructions    We will discuss octreotide versus systemic oxycodone to help with decreased motility of diarrhea thru ileostomy  We started scopolamine patch transdermal to help dry up secretions  Discussed possible sedation that may occur with the patch.     Our RN Oriana will call you next week to check on how the patch is working, and if it is still not working we may increase the dose.          Follow-ups after your visit        Follow-up notes from your care team     Return in about 6 weeks (around 2/1/2018) for diarrhea.      Your next 10 appointments already scheduled     Jan 25, 2018  2:15 PM CST   (Arrive by 2:00 PM)   Return Visit with Sukhdeep Mota MD   [facility] ([facility])    [address] [place] [address] [phone]              Who to contact     If you have questions or need follow up information about today's clinic visit or your schedule please contact [facility] directly at [phone].  Normal or non-critical lab and imaging results will be communicated to you by MyChart, letter or phone within 4 business days after the clinic has received the results. If you do not hear from us within 7 days, please contact the clinic through MyChart or phone. If you have a critical or abnormal lab result, we will notify you by phone as soon as possible.  Submit refill requests through [employer] or call your pharmacy and they will forward the refill request to us. Please allow 3 business days for your refill to be completed.     "      Additional Information About Your Visit        MyChart Information     [employer] gives you secure access to your electronic health record. If you see a primary care provider, you can also send messages to your care team and make appointments. If you have questions, please call your primary care clinic.  If you do not have a primary care provider, please call [phone] and they will assist you.        Care EveryWhere ID     This is your Care EveryWhere ID. This could be used by other organizations to access your [facility] medical records  [id number]        Your Vitals Were     Pulse Temperature Respirations Height Pulse Oximetry BMI (Body Mass Index)    100 97.3  F (36.3  C) (Oral) 16 1.753 m (5' 9\") 100% 21.46 kg/m2       Blood Pressure from Last 3 Encounters:   12/21/17 125/81   12/14/17 115/76   12/14/17 115/83    Weight from Last 3 Encounters:   12/21/17 65.9 kg (145 lb 5 oz)   12/14/17 65.8 kg (145 lb 1.6 oz)   12/06/17 69.9 kg (154 lb 1.6 oz)              Today, you had the following     No orders found for display         Today's Medication Changes          These changes are accurate as of: 12/21/17  4:40 PM.  If you have any questions, ask your nurse or doctor.               Start taking these medicines.        Dose/Directions    scopolamine 72 hr patch   Commonly known as:  TRANSDERM   Used for:  Diarrhea, unspecified type   Started by:  Sukhdeep Mota MD        Dose:  1 patch   Place 1 patch onto the skin every 72 hours   Quantity:  10 patch   Refills:  3         These medicines have changed or have updated prescriptions.        Dose/Directions    Potassium Chloride 40 MEQ/15ML (20%) Soln   This may have changed:  when to take this   Used for:  Hypokalemia        Dose:  40 mEq   Take 15 mLs (40 mEq) by mouth daily   Quantity:  450 mL   Refills:  3            Where to get your medicines      These medications were sent to [facility] [place], [place] - [address] [place] [address] Se " [address]  9 [facility] [address] [place] [address]    Hours:  TRANSPLANT PHONE NUMBER [phone] Phone:  [phone]     scopolamine 72 hr patch                Primary Care Provider Office Phone # Fax #    Jaguar Becker [phone]       [facility] [address]  [facility] [address]        Equal Access to Services     SARAH DUNN : Hadii aad ku hadasho Soomaali, waaxda luqadaha, qaybta kaalmada adeegyada, waxay idiin hayaan adeeg kharash la'aan ah. So [facility] [phone].    ATENCIÓN: Si habla espashok, tiene a cid disposición servicios gratuitos de asistencia lingüística. Llame al [phone].    We comply with applicable federal civil rights laws and [place] laws. We do not discriminate on the basis of race, color, national origin, age, disability, sex, sexual orientation, or gender identity.            Thank you!     Thank you for choosing [facility]  for your care. Our goal is always to provide you with excellent care. Hearing back from our patients is one way we can continue to improve our services. Please take a few minutes to complete the written survey that you may receive in the mail after your visit with us. Thank you!             Your Updated Medication List - Protect others around you: Learn how to safely use, store and throw away your medicines at www.disposemymeds.org.          This list is accurate as of: 12/21/17  4:40 PM.  Always use your most recent med list.                   Brand Name Dispense Instructions for use Diagnosis    amylase-lipase-protease 95823 UNITS Cpep    CREON    240 capsule    Take 2 capsules (72,000 Units) by mouth 3 times daily (with meals) And 1 capsule with each snack tid.    Diarrhea, unspecified type, Peritoneal carcinomatosis (H), Cancer of sigmoid colon (H)       diphenoxylate-atropine 2.5-0.025 MG per tablet    LOMOTIL    240 tablet    Take 1-2 tablets by mouth 4 times daily as needed for diarrhea    Diarrhea due to  drug       IRON SUPPLEMENT PO      Take 325 mg by mouth 2 times daily (with meals)        loperamide 2 MG capsule    IMODIUM     Take 4 mg by mouth 4 times daily as needed for diarrhea        LORazepam 0.5 MG tablet    ATIVAN    30 tablet    Take 1 tablet (0.5 mg) by mouth every 4 hours as needed (Anxiety, Nausea/Vomiting or Sleep)    Peritoneal carcinomatosis (H), Cancer of sigmoid colon (H)       magnesium oxide 400 MG tablet    MAG-OX    60 tablet    Take 1 tablet (400 mg) by mouth 2 times daily    Hypomagnesemia       OMEPRAZOLE PO      Take 20 mg by mouth 2 times daily (before meals)        opium tincture tincture     72 mL    Take 0.6 mLs (6 mg) by mouth 4 times daily    Diarrhea, unspecified type, Cancer of sigmoid colon (H), Peritoneal carcinomatosis (H)       oxyCODONE IR 5 MG tablet    ROXICODONE    25 tablet    Take 1-2 tablets (5-10 mg) by mouth every 3 hours as needed for moderate to severe pain    Acute post-operative pain       Potassium Chloride 40 MEQ/15ML (20%) Soln     450 mL    Take 15 mLs (40 mEq) by mouth daily    Hypokalemia       Psyllium 51.7 % Pack    METAMUCIL FIBER    90 each    Take 1 packet by mouth 3 times daily    Diarrhea, unspecified type       scopolamine 72 hr patch    TRANSDERM    10 patch    Place 1 patch onto the skin every 72 hours    Diarrhea, unspecified type

## 2018-01-01 ENCOUNTER — HOME INFUSION (PRE-WILLOW HOME INFUSION) (OUTPATIENT)
Dept: PHARMACY | Facility: CLINIC | Age: 53
End: 2018-01-01

## 2018-01-01 ENCOUNTER — APPOINTMENT (OUTPATIENT)
Dept: LAB | Facility: CLINIC | Age: 53
End: 2018-01-01
Attending: INTERNAL MEDICINE
Payer: COMMERCIAL

## 2018-01-01 ENCOUNTER — TRANSFERRED RECORDS (OUTPATIENT)
Dept: HEALTH INFORMATION MANAGEMENT | Facility: CLINIC | Age: 53
End: 2018-01-01

## 2018-01-01 ENCOUNTER — CARE COORDINATION (OUTPATIENT)
Dept: ONCOLOGY | Facility: CLINIC | Age: 53
End: 2018-01-01

## 2018-01-01 ENCOUNTER — ONCOLOGY VISIT (OUTPATIENT)
Dept: ONCOLOGY | Facility: CLINIC | Age: 53
End: 2018-01-01
Attending: INTERNAL MEDICINE
Payer: COMMERCIAL

## 2018-01-01 ENCOUNTER — MEDICAL CORRESPONDENCE (OUTPATIENT)
Dept: HEALTH INFORMATION MANAGEMENT | Facility: CLINIC | Age: 53
End: 2018-01-01

## 2018-01-01 ENCOUNTER — APPOINTMENT (OUTPATIENT)
Dept: INTERVENTIONAL RADIOLOGY/VASCULAR | Facility: CLINIC | Age: 53
DRG: 314 | End: 2018-01-01
Attending: NURSE PRACTITIONER
Payer: COMMERCIAL

## 2018-01-01 ENCOUNTER — TELEPHONE (OUTPATIENT)
Dept: OTHER | Facility: CLINIC | Age: 53
End: 2018-01-01

## 2018-01-01 ENCOUNTER — APPOINTMENT (OUTPATIENT)
Dept: GENERAL RADIOLOGY | Facility: CLINIC | Age: 53
DRG: 388 | End: 2018-01-01
Attending: NURSE PRACTITIONER
Payer: COMMERCIAL

## 2018-01-01 ENCOUNTER — HOSPITAL ENCOUNTER (EMERGENCY)
Facility: CLINIC | Age: 53
Discharge: HOME | End: 2018-01-26
Attending: INTERNAL MEDICINE | Admitting: INTERNAL MEDICINE
Payer: COMMERCIAL

## 2018-01-01 ENCOUNTER — ANESTHESIA (OUTPATIENT)
Dept: SURGERY | Facility: CLINIC | Age: 53
DRG: 375 | End: 2018-01-01
Payer: COMMERCIAL

## 2018-01-01 ENCOUNTER — TELEPHONE (OUTPATIENT)
Dept: PALLIATIVE CARE | Facility: CLINIC | Age: 53
End: 2018-01-01

## 2018-01-01 ENCOUNTER — NURSE TRIAGE (OUTPATIENT)
Dept: NURSING | Facility: CLINIC | Age: 53
End: 2018-01-01

## 2018-01-01 ENCOUNTER — APPOINTMENT (OUTPATIENT)
Dept: GENERAL RADIOLOGY | Facility: CLINIC | Age: 53
DRG: 375 | End: 2018-01-01
Payer: COMMERCIAL

## 2018-01-01 ENCOUNTER — APPOINTMENT (OUTPATIENT)
Dept: GENERAL RADIOLOGY | Facility: CLINIC | Age: 53
DRG: 375 | End: 2018-01-01
Attending: INTERNAL MEDICINE
Payer: COMMERCIAL

## 2018-01-01 ENCOUNTER — ONCOLOGY VISIT (OUTPATIENT)
Dept: ONCOLOGY | Facility: CLINIC | Age: 53
End: 2018-01-01
Attending: PHYSICIAN ASSISTANT
Payer: COMMERCIAL

## 2018-01-01 ENCOUNTER — APPOINTMENT (OUTPATIENT)
Dept: INTERVENTIONAL RADIOLOGY/VASCULAR | Facility: CLINIC | Age: 53
DRG: 388 | End: 2018-01-01
Attending: RADIOLOGY PRACTITIONER ASSISTANT
Payer: COMMERCIAL

## 2018-01-01 ENCOUNTER — TELEPHONE (OUTPATIENT)
Dept: ONCOLOGY | Facility: CLINIC | Age: 53
End: 2018-01-01

## 2018-01-01 ENCOUNTER — HOSPITAL ENCOUNTER (INPATIENT)
Facility: CLINIC | Age: 53
LOS: 4 days | Discharge: HOME HEALTH | DRG: 374 | End: 2018-02-09
Attending: INTERNAL MEDICINE | Admitting: INTERNAL MEDICINE
Payer: COMMERCIAL

## 2018-01-01 ENCOUNTER — OFFICE VISIT (OUTPATIENT)
Dept: PALLIATIVE CARE | Facility: CLINIC | Age: 53
End: 2018-01-01
Attending: INTERNAL MEDICINE
Payer: COMMERCIAL

## 2018-01-01 ENCOUNTER — ANESTHESIA EVENT (OUTPATIENT)
Dept: SURGERY | Facility: CLINIC | Age: 53
DRG: 375 | End: 2018-01-01
Payer: COMMERCIAL

## 2018-01-01 ENCOUNTER — APPOINTMENT (OUTPATIENT)
Dept: CT IMAGING | Facility: CLINIC | Age: 53
DRG: 374 | End: 2018-01-01
Attending: NURSE PRACTITIONER
Payer: COMMERCIAL

## 2018-01-01 ENCOUNTER — APPOINTMENT (OUTPATIENT)
Dept: CT IMAGING | Facility: CLINIC | Age: 53
DRG: 375 | End: 2018-01-01
Attending: EMERGENCY MEDICINE
Payer: COMMERCIAL

## 2018-01-01 ENCOUNTER — ANESTHESIA (OUTPATIENT)
Dept: SURGERY | Facility: CLINIC | Age: 53
DRG: 374 | End: 2018-01-01
Payer: COMMERCIAL

## 2018-01-01 ENCOUNTER — HOSPITAL ENCOUNTER (INPATIENT)
Facility: CLINIC | Age: 53
LOS: 4 days | Discharge: HOME HEALTH | DRG: 314 | End: 2018-10-23
Attending: EMERGENCY MEDICINE | Admitting: INTERNAL MEDICINE
Payer: COMMERCIAL

## 2018-01-01 ENCOUNTER — APPOINTMENT (OUTPATIENT)
Dept: CARDIOLOGY | Facility: CLINIC | Age: 53
DRG: 314 | End: 2018-01-01
Attending: PHYSICIAN ASSISTANT
Payer: COMMERCIAL

## 2018-01-01 ENCOUNTER — RADIANT APPOINTMENT (OUTPATIENT)
Dept: CT IMAGING | Facility: CLINIC | Age: 53
End: 2018-01-01
Attending: PHYSICIAN ASSISTANT
Payer: COMMERCIAL

## 2018-01-01 ENCOUNTER — APPOINTMENT (OUTPATIENT)
Dept: GENERAL RADIOLOGY | Facility: CLINIC | Age: 53
DRG: 314 | End: 2018-01-01
Attending: EMERGENCY MEDICINE
Payer: COMMERCIAL

## 2018-01-01 ENCOUNTER — APPOINTMENT (OUTPATIENT)
Dept: GENERAL RADIOLOGY | Facility: CLINIC | Age: 53
DRG: 388 | End: 2018-01-01
Attending: INTERNAL MEDICINE
Payer: COMMERCIAL

## 2018-01-01 ENCOUNTER — HOSPITAL ENCOUNTER (INPATIENT)
Facility: CLINIC | Age: 53
LOS: 5 days | Discharge: HOME HEALTH | DRG: 314 | End: 2018-02-25
Attending: EMERGENCY MEDICINE | Admitting: INTERNAL MEDICINE
Payer: COMMERCIAL

## 2018-01-01 ENCOUNTER — RADIANT APPOINTMENT (OUTPATIENT)
Dept: CT IMAGING | Facility: CLINIC | Age: 53
End: 2018-01-01
Attending: NURSE PRACTITIONER
Payer: COMMERCIAL

## 2018-01-01 ENCOUNTER — CARE COORDINATION (OUTPATIENT)
Dept: GASTROENTEROLOGY | Facility: CLINIC | Age: 53
End: 2018-01-01

## 2018-01-01 ENCOUNTER — ALLIED HEALTH/NURSE VISIT (OUTPATIENT)
Dept: ONCOLOGY | Facility: CLINIC | Age: 53
End: 2018-01-01

## 2018-01-01 ENCOUNTER — HOSPITAL ENCOUNTER (INPATIENT)
Facility: CLINIC | Age: 53
LOS: 5 days | Discharge: HOME HEALTH | DRG: 375 | End: 2018-07-08
Attending: EMERGENCY MEDICINE | Admitting: INTERNAL MEDICINE
Payer: COMMERCIAL

## 2018-01-01 ENCOUNTER — APPOINTMENT (OUTPATIENT)
Dept: CT IMAGING | Facility: CLINIC | Age: 53
DRG: 314 | End: 2018-01-01
Attending: EMERGENCY MEDICINE
Payer: COMMERCIAL

## 2018-01-01 ENCOUNTER — INFUSION THERAPY VISIT (OUTPATIENT)
Dept: TRANSPLANT | Facility: CLINIC | Age: 53
End: 2018-01-01
Attending: INTERNAL MEDICINE
Payer: COMMERCIAL

## 2018-01-01 ENCOUNTER — ALLIED HEALTH/NURSE VISIT (OUTPATIENT)
Dept: PHARMACY | Facility: CLINIC | Age: 53
End: 2018-01-01
Attending: INTERNAL MEDICINE
Payer: COMMERCIAL

## 2018-01-01 ENCOUNTER — APPOINTMENT (OUTPATIENT)
Dept: PHYSICAL THERAPY | Facility: CLINIC | Age: 53
DRG: 374 | End: 2018-01-01
Attending: INTERNAL MEDICINE
Payer: COMMERCIAL

## 2018-01-01 ENCOUNTER — HOSPITAL ENCOUNTER (INPATIENT)
Facility: CLINIC | Age: 53
LOS: 2 days | Discharge: HOME HEALTH | DRG: 375 | End: 2018-03-20
Attending: EMERGENCY MEDICINE | Admitting: INTERNAL MEDICINE
Payer: COMMERCIAL

## 2018-01-01 ENCOUNTER — ANESTHESIA EVENT (OUTPATIENT)
Dept: SURGERY | Facility: CLINIC | Age: 53
DRG: 374 | End: 2018-01-01
Payer: COMMERCIAL

## 2018-01-01 ENCOUNTER — HOSPITAL ENCOUNTER (INPATIENT)
Facility: CLINIC | Age: 53
LOS: 13 days | Discharge: HOME HEALTH | DRG: 388 | End: 2018-08-06
Attending: INTERNAL MEDICINE | Admitting: INTERNAL MEDICINE
Payer: COMMERCIAL

## 2018-01-01 ENCOUNTER — DOCUMENTATION ONLY (OUTPATIENT)
Dept: PHARMACY | Facility: CLINIC | Age: 53
End: 2018-01-01

## 2018-01-01 ENCOUNTER — APPOINTMENT (OUTPATIENT)
Dept: LAB | Facility: CLINIC | Age: 53
DRG: 374 | End: 2018-01-01
Attending: PHYSICIAN ASSISTANT
Payer: COMMERCIAL

## 2018-01-01 ENCOUNTER — APPOINTMENT (OUTPATIENT)
Dept: GENERAL RADIOLOGY | Facility: CLINIC | Age: 53
DRG: 314 | End: 2018-01-01
Attending: PHYSICIAN ASSISTANT
Payer: COMMERCIAL

## 2018-01-01 ENCOUNTER — DOCUMENTATION ONLY (OUTPATIENT)
Dept: ONCOLOGY | Facility: CLINIC | Age: 53
End: 2018-01-01

## 2018-01-01 ENCOUNTER — ALLIED HEALTH/NURSE VISIT (OUTPATIENT)
Dept: PHARMACY | Facility: CLINIC | Age: 53
End: 2018-01-01
Payer: COMMERCIAL

## 2018-01-01 ENCOUNTER — APPOINTMENT (OUTPATIENT)
Dept: PHYSICAL THERAPY | Facility: CLINIC | Age: 53
DRG: 374 | End: 2018-01-01
Attending: NURSE PRACTITIONER
Payer: COMMERCIAL

## 2018-01-01 ENCOUNTER — HOSPITAL ENCOUNTER (INPATIENT)
Facility: CLINIC | Age: 53
LOS: 6 days | Discharge: HOSPICE | DRG: 374 | End: 2018-12-15
Attending: EMERGENCY MEDICINE | Admitting: INTERNAL MEDICINE
Payer: COMMERCIAL

## 2018-01-01 ENCOUNTER — APPOINTMENT (OUTPATIENT)
Dept: CT IMAGING | Facility: CLINIC | Age: 53
End: 2018-01-01
Attending: INTERNAL MEDICINE
Payer: COMMERCIAL

## 2018-01-01 ENCOUNTER — APPOINTMENT (OUTPATIENT)
Dept: GENERAL RADIOLOGY | Facility: CLINIC | Age: 53
DRG: 314 | End: 2018-01-01
Attending: NURSE PRACTITIONER
Payer: COMMERCIAL

## 2018-01-01 ENCOUNTER — ONCOLOGY VISIT (OUTPATIENT)
Dept: ONCOLOGY | Facility: CLINIC | Age: 53
DRG: 388 | End: 2018-01-01
Attending: PHYSICIAN ASSISTANT
Payer: COMMERCIAL

## 2018-01-01 ENCOUNTER — APPOINTMENT (OUTPATIENT)
Dept: GENERAL RADIOLOGY | Facility: CLINIC | Age: 53
DRG: 374 | End: 2018-01-01
Attending: INTERNAL MEDICINE
Payer: COMMERCIAL

## 2018-01-01 ENCOUNTER — APPOINTMENT (OUTPATIENT)
Dept: CT IMAGING | Facility: CLINIC | Age: 53
DRG: 374 | End: 2018-01-01
Attending: EMERGENCY MEDICINE
Payer: COMMERCIAL

## 2018-01-01 ENCOUNTER — ONCOLOGY VISIT (OUTPATIENT)
Dept: ONCOLOGY | Facility: CLINIC | Age: 53
DRG: 374 | End: 2018-01-01
Attending: PHYSICIAN ASSISTANT
Payer: COMMERCIAL

## 2018-01-01 VITALS
BODY MASS INDEX: 21.24 KG/M2 | OXYGEN SATURATION: 99 % | DIASTOLIC BLOOD PRESSURE: 86 MMHG | TEMPERATURE: 97.7 F | WEIGHT: 143.9 LBS | SYSTOLIC BLOOD PRESSURE: 125 MMHG | RESPIRATION RATE: 16 BRPM | HEART RATE: 84 BPM

## 2018-01-01 VITALS
SYSTOLIC BLOOD PRESSURE: 118 MMHG | OXYGEN SATURATION: 99 % | BODY MASS INDEX: 20.93 KG/M2 | HEART RATE: 93 BPM | WEIGHT: 141.3 LBS | TEMPERATURE: 98 F | DIASTOLIC BLOOD PRESSURE: 75 MMHG | HEIGHT: 69 IN

## 2018-01-01 VITALS
DIASTOLIC BLOOD PRESSURE: 65 MMHG | BODY MASS INDEX: 20.1 KG/M2 | RESPIRATION RATE: 16 BRPM | SYSTOLIC BLOOD PRESSURE: 122 MMHG | TEMPERATURE: 97.6 F | HEART RATE: 102 BPM | HEIGHT: 69 IN | WEIGHT: 135.7 LBS | OXYGEN SATURATION: 99 %

## 2018-01-01 VITALS
BODY MASS INDEX: 21.97 KG/M2 | RESPIRATION RATE: 16 BRPM | DIASTOLIC BLOOD PRESSURE: 67 MMHG | TEMPERATURE: 97.8 F | SYSTOLIC BLOOD PRESSURE: 109 MMHG | OXYGEN SATURATION: 100 % | WEIGHT: 148.3 LBS | HEART RATE: 114 BPM | HEIGHT: 69 IN

## 2018-01-01 VITALS
DIASTOLIC BLOOD PRESSURE: 76 MMHG | SYSTOLIC BLOOD PRESSURE: 113 MMHG | TEMPERATURE: 99.4 F | OXYGEN SATURATION: 98 % | RESPIRATION RATE: 16 BRPM | HEART RATE: 74 BPM | BODY MASS INDEX: 21.86 KG/M2 | WEIGHT: 148 LBS

## 2018-01-01 VITALS
TEMPERATURE: 98.8 F | RESPIRATION RATE: 16 BRPM | WEIGHT: 152.6 LBS | SYSTOLIC BLOOD PRESSURE: 102 MMHG | DIASTOLIC BLOOD PRESSURE: 72 MMHG | HEART RATE: 95 BPM | OXYGEN SATURATION: 97 % | BODY MASS INDEX: 22.54 KG/M2

## 2018-01-01 VITALS
HEART RATE: 107 BPM | RESPIRATION RATE: 18 BRPM | SYSTOLIC BLOOD PRESSURE: 108 MMHG | BODY MASS INDEX: 21.28 KG/M2 | TEMPERATURE: 98.3 F | HEIGHT: 69 IN | DIASTOLIC BLOOD PRESSURE: 76 MMHG | WEIGHT: 143.7 LBS | OXYGEN SATURATION: 99 %

## 2018-01-01 VITALS
BODY MASS INDEX: 21.18 KG/M2 | TEMPERATURE: 97.7 F | HEART RATE: 75 BPM | WEIGHT: 143 LBS | DIASTOLIC BLOOD PRESSURE: 82 MMHG | HEIGHT: 69 IN | SYSTOLIC BLOOD PRESSURE: 112 MMHG | RESPIRATION RATE: 16 BRPM | OXYGEN SATURATION: 97 %

## 2018-01-01 VITALS
OXYGEN SATURATION: 98 % | HEART RATE: 89 BPM | WEIGHT: 158.4 LBS | TEMPERATURE: 98.1 F | DIASTOLIC BLOOD PRESSURE: 80 MMHG | BODY MASS INDEX: 23.46 KG/M2 | RESPIRATION RATE: 16 BRPM | HEIGHT: 69 IN | SYSTOLIC BLOOD PRESSURE: 116 MMHG

## 2018-01-01 VITALS
HEART RATE: 111 BPM | RESPIRATION RATE: 18 BRPM | DIASTOLIC BLOOD PRESSURE: 89 MMHG | SYSTOLIC BLOOD PRESSURE: 133 MMHG | BODY MASS INDEX: 20.04 KG/M2 | WEIGHT: 135.7 LBS | OXYGEN SATURATION: 96 % | TEMPERATURE: 97.1 F

## 2018-01-01 VITALS
BODY MASS INDEX: 23.35 KG/M2 | WEIGHT: 157.63 LBS | HEART RATE: 102 BPM | SYSTOLIC BLOOD PRESSURE: 124 MMHG | RESPIRATION RATE: 16 BRPM | HEIGHT: 69 IN | TEMPERATURE: 98 F | OXYGEN SATURATION: 96 % | DIASTOLIC BLOOD PRESSURE: 85 MMHG

## 2018-01-01 VITALS
RESPIRATION RATE: 16 BRPM | HEART RATE: 115 BPM | TEMPERATURE: 97.5 F | BODY MASS INDEX: 21.18 KG/M2 | OXYGEN SATURATION: 98 % | DIASTOLIC BLOOD PRESSURE: 85 MMHG | SYSTOLIC BLOOD PRESSURE: 109 MMHG | WEIGHT: 143.4 LBS

## 2018-01-01 VITALS
OXYGEN SATURATION: 98 % | TEMPERATURE: 97.9 F | HEART RATE: 91 BPM | RESPIRATION RATE: 14 BRPM | DIASTOLIC BLOOD PRESSURE: 89 MMHG | BODY MASS INDEX: 21.75 KG/M2 | WEIGHT: 147.27 LBS | SYSTOLIC BLOOD PRESSURE: 124 MMHG

## 2018-01-01 VITALS
WEIGHT: 152.6 LBS | OXYGEN SATURATION: 100 % | BODY MASS INDEX: 22.6 KG/M2 | HEART RATE: 104 BPM | DIASTOLIC BLOOD PRESSURE: 80 MMHG | HEIGHT: 69 IN | RESPIRATION RATE: 16 BRPM | TEMPERATURE: 97.4 F | SYSTOLIC BLOOD PRESSURE: 112 MMHG

## 2018-01-01 VITALS
DIASTOLIC BLOOD PRESSURE: 74 MMHG | BODY MASS INDEX: 22.07 KG/M2 | HEIGHT: 69 IN | TEMPERATURE: 98.1 F | HEART RATE: 103 BPM | WEIGHT: 149 LBS | SYSTOLIC BLOOD PRESSURE: 117 MMHG | HEART RATE: 127 BPM | RESPIRATION RATE: 18 BRPM | WEIGHT: 150.2 LBS | HEIGHT: 69 IN | SYSTOLIC BLOOD PRESSURE: 104 MMHG | OXYGEN SATURATION: 93 % | OXYGEN SATURATION: 98 % | BODY MASS INDEX: 22.25 KG/M2 | RESPIRATION RATE: 18 BRPM | DIASTOLIC BLOOD PRESSURE: 70 MMHG | TEMPERATURE: 98.2 F

## 2018-01-01 VITALS
SYSTOLIC BLOOD PRESSURE: 107 MMHG | WEIGHT: 141.75 LBS | RESPIRATION RATE: 18 BRPM | TEMPERATURE: 98.6 F | WEIGHT: 143.3 LBS | BODY MASS INDEX: 21.15 KG/M2 | HEIGHT: 69 IN | BODY MASS INDEX: 21 KG/M2 | OXYGEN SATURATION: 99 % | TEMPERATURE: 98.4 F | DIASTOLIC BLOOD PRESSURE: 67 MMHG | HEART RATE: 107 BPM | SYSTOLIC BLOOD PRESSURE: 119 MMHG | DIASTOLIC BLOOD PRESSURE: 69 MMHG | RESPIRATION RATE: 16 BRPM | OXYGEN SATURATION: 98 % | HEART RATE: 94 BPM

## 2018-01-01 VITALS
SYSTOLIC BLOOD PRESSURE: 97 MMHG | RESPIRATION RATE: 15 BRPM | DIASTOLIC BLOOD PRESSURE: 65 MMHG | HEIGHT: 69 IN | TEMPERATURE: 96.4 F | WEIGHT: 141.3 LBS | HEART RATE: 75 BPM | OXYGEN SATURATION: 100 % | BODY MASS INDEX: 20.93 KG/M2

## 2018-01-01 VITALS
OXYGEN SATURATION: 97 % | TEMPERATURE: 98.4 F | HEART RATE: 106 BPM | SYSTOLIC BLOOD PRESSURE: 116 MMHG | RESPIRATION RATE: 16 BRPM | BODY MASS INDEX: 22.31 KG/M2 | WEIGHT: 151.1 LBS | DIASTOLIC BLOOD PRESSURE: 72 MMHG

## 2018-01-01 VITALS
TEMPERATURE: 99.2 F | HEART RATE: 115 BPM | WEIGHT: 161 LBS | OXYGEN SATURATION: 99 % | BODY MASS INDEX: 23.85 KG/M2 | RESPIRATION RATE: 16 BRPM | SYSTOLIC BLOOD PRESSURE: 112 MMHG | DIASTOLIC BLOOD PRESSURE: 71 MMHG | HEIGHT: 69 IN

## 2018-01-01 VITALS
SYSTOLIC BLOOD PRESSURE: 118 MMHG | TEMPERATURE: 98.1 F | OXYGEN SATURATION: 99 % | DIASTOLIC BLOOD PRESSURE: 80 MMHG | RESPIRATION RATE: 16 BRPM | BODY MASS INDEX: 22.82 KG/M2 | WEIGHT: 154.54 LBS | HEART RATE: 89 BPM

## 2018-01-01 VITALS
RESPIRATION RATE: 16 BRPM | OXYGEN SATURATION: 99 % | HEART RATE: 105 BPM | TEMPERATURE: 99.4 F | SYSTOLIC BLOOD PRESSURE: 103 MMHG | HEIGHT: 69 IN | DIASTOLIC BLOOD PRESSURE: 73 MMHG | WEIGHT: 150 LBS | BODY MASS INDEX: 22.22 KG/M2

## 2018-01-01 VITALS
WEIGHT: 143.6 LBS | BODY MASS INDEX: 21.27 KG/M2 | HEIGHT: 69 IN | SYSTOLIC BLOOD PRESSURE: 105 MMHG | RESPIRATION RATE: 20 BRPM | OXYGEN SATURATION: 98 % | HEART RATE: 104 BPM | TEMPERATURE: 99.3 F | DIASTOLIC BLOOD PRESSURE: 64 MMHG

## 2018-01-01 VITALS
SYSTOLIC BLOOD PRESSURE: 122 MMHG | WEIGHT: 149 LBS | HEART RATE: 98 BPM | OXYGEN SATURATION: 98 % | DIASTOLIC BLOOD PRESSURE: 84 MMHG | RESPIRATION RATE: 16 BRPM | BODY MASS INDEX: 22 KG/M2 | TEMPERATURE: 98.7 F

## 2018-01-01 VITALS
SYSTOLIC BLOOD PRESSURE: 113 MMHG | TEMPERATURE: 97.7 F | WEIGHT: 152.4 LBS | HEIGHT: 69 IN | RESPIRATION RATE: 18 BRPM | BODY MASS INDEX: 22.57 KG/M2 | DIASTOLIC BLOOD PRESSURE: 70 MMHG | OXYGEN SATURATION: 100 %

## 2018-01-01 VITALS
HEART RATE: 93 BPM | DIASTOLIC BLOOD PRESSURE: 78 MMHG | SYSTOLIC BLOOD PRESSURE: 120 MMHG | HEIGHT: 69 IN | BODY MASS INDEX: 22.59 KG/M2 | TEMPERATURE: 98.3 F | WEIGHT: 152.5 LBS | OXYGEN SATURATION: 98 % | RESPIRATION RATE: 16 BRPM

## 2018-01-01 VITALS
RESPIRATION RATE: 18 BRPM | WEIGHT: 144.2 LBS | BODY MASS INDEX: 21.28 KG/M2 | DIASTOLIC BLOOD PRESSURE: 88 MMHG | OXYGEN SATURATION: 98 % | TEMPERATURE: 98.5 F | SYSTOLIC BLOOD PRESSURE: 127 MMHG | HEART RATE: 95 BPM

## 2018-01-01 VITALS
WEIGHT: 159.2 LBS | SYSTOLIC BLOOD PRESSURE: 124 MMHG | TEMPERATURE: 98.4 F | RESPIRATION RATE: 16 BRPM | HEART RATE: 99 BPM | OXYGEN SATURATION: 97 % | HEIGHT: 69 IN | BODY MASS INDEX: 23.58 KG/M2 | DIASTOLIC BLOOD PRESSURE: 83 MMHG

## 2018-01-01 VITALS
WEIGHT: 164.6 LBS | HEIGHT: 69 IN | OXYGEN SATURATION: 98 % | HEART RATE: 107 BPM | DIASTOLIC BLOOD PRESSURE: 82 MMHG | BODY MASS INDEX: 24.38 KG/M2 | RESPIRATION RATE: 18 BRPM | TEMPERATURE: 98.7 F | SYSTOLIC BLOOD PRESSURE: 118 MMHG

## 2018-01-01 VITALS
HEIGHT: 69 IN | WEIGHT: 146.7 LBS | DIASTOLIC BLOOD PRESSURE: 86 MMHG | BODY MASS INDEX: 21.73 KG/M2 | OXYGEN SATURATION: 98 % | HEART RATE: 114 BPM | SYSTOLIC BLOOD PRESSURE: 124 MMHG | TEMPERATURE: 96 F | RESPIRATION RATE: 16 BRPM

## 2018-01-01 VITALS
HEART RATE: 109 BPM | TEMPERATURE: 98.3 F | HEIGHT: 69 IN | DIASTOLIC BLOOD PRESSURE: 74 MMHG | SYSTOLIC BLOOD PRESSURE: 111 MMHG | RESPIRATION RATE: 16 BRPM | WEIGHT: 154.2 LBS | OXYGEN SATURATION: 96 % | BODY MASS INDEX: 22.84 KG/M2

## 2018-01-01 VITALS
HEART RATE: 119 BPM | HEIGHT: 69 IN | TEMPERATURE: 99.1 F | OXYGEN SATURATION: 97 % | SYSTOLIC BLOOD PRESSURE: 122 MMHG | WEIGHT: 147.13 LBS | BODY MASS INDEX: 21.79 KG/M2 | DIASTOLIC BLOOD PRESSURE: 75 MMHG | RESPIRATION RATE: 16 BRPM

## 2018-01-01 VITALS
OXYGEN SATURATION: 99 % | RESPIRATION RATE: 14 BRPM | SYSTOLIC BLOOD PRESSURE: 127 MMHG | DIASTOLIC BLOOD PRESSURE: 89 MMHG | HEART RATE: 87 BPM

## 2018-01-01 VITALS
DIASTOLIC BLOOD PRESSURE: 71 MMHG | SYSTOLIC BLOOD PRESSURE: 110 MMHG | RESPIRATION RATE: 16 BRPM | TEMPERATURE: 97.7 F | OXYGEN SATURATION: 98 % | HEART RATE: 99 BPM

## 2018-01-01 VITALS
TEMPERATURE: 97.5 F | OXYGEN SATURATION: 98 % | SYSTOLIC BLOOD PRESSURE: 116 MMHG | WEIGHT: 139.3 LBS | HEART RATE: 91 BPM | BODY MASS INDEX: 20.56 KG/M2 | DIASTOLIC BLOOD PRESSURE: 84 MMHG

## 2018-01-01 VITALS
BODY MASS INDEX: 20.93 KG/M2 | RESPIRATION RATE: 18 BRPM | SYSTOLIC BLOOD PRESSURE: 107 MMHG | TEMPERATURE: 98.4 F | HEART RATE: 107 BPM | WEIGHT: 141.8 LBS | OXYGEN SATURATION: 98 % | DIASTOLIC BLOOD PRESSURE: 69 MMHG

## 2018-01-01 VITALS
DIASTOLIC BLOOD PRESSURE: 85 MMHG | RESPIRATION RATE: 18 BRPM | SYSTOLIC BLOOD PRESSURE: 127 MMHG | HEIGHT: 69 IN | TEMPERATURE: 99.1 F | BODY MASS INDEX: 21.06 KG/M2 | WEIGHT: 142.2 LBS | HEART RATE: 94 BPM | OXYGEN SATURATION: 99 %

## 2018-01-01 VITALS
WEIGHT: 151.3 LBS | HEART RATE: 96 BPM | TEMPERATURE: 98.5 F | SYSTOLIC BLOOD PRESSURE: 117 MMHG | OXYGEN SATURATION: 98 % | BODY MASS INDEX: 22.34 KG/M2 | DIASTOLIC BLOOD PRESSURE: 73 MMHG

## 2018-01-01 VITALS
WEIGHT: 143.2 LBS | TEMPERATURE: 98.2 F | HEIGHT: 69 IN | DIASTOLIC BLOOD PRESSURE: 81 MMHG | RESPIRATION RATE: 16 BRPM | SYSTOLIC BLOOD PRESSURE: 120 MMHG | OXYGEN SATURATION: 98 % | BODY MASS INDEX: 21.21 KG/M2 | HEART RATE: 110 BPM

## 2018-01-01 VITALS
SYSTOLIC BLOOD PRESSURE: 106 MMHG | HEART RATE: 104 BPM | BODY MASS INDEX: 22.96 KG/M2 | OXYGEN SATURATION: 98 % | DIASTOLIC BLOOD PRESSURE: 73 MMHG | RESPIRATION RATE: 18 BRPM | WEIGHT: 155.5 LBS

## 2018-01-01 VITALS — HEART RATE: 100 BPM | TEMPERATURE: 99.4 F

## 2018-01-01 VITALS
RESPIRATION RATE: 16 BRPM | DIASTOLIC BLOOD PRESSURE: 80 MMHG | HEART RATE: 90 BPM | HEIGHT: 69 IN | TEMPERATURE: 99.1 F | BODY MASS INDEX: 22.28 KG/M2 | WEIGHT: 150.4 LBS | SYSTOLIC BLOOD PRESSURE: 119 MMHG | OXYGEN SATURATION: 99 %

## 2018-01-01 VITALS
WEIGHT: 144 LBS | BODY MASS INDEX: 21.26 KG/M2 | TEMPERATURE: 98.3 F | SYSTOLIC BLOOD PRESSURE: 110 MMHG | OXYGEN SATURATION: 99 % | HEART RATE: 95 BPM | RESPIRATION RATE: 16 BRPM | DIASTOLIC BLOOD PRESSURE: 81 MMHG

## 2018-01-01 VITALS
BODY MASS INDEX: 23.4 KG/M2 | HEART RATE: 89 BPM | RESPIRATION RATE: 16 BRPM | WEIGHT: 158 LBS | TEMPERATURE: 98.1 F | HEIGHT: 69 IN | SYSTOLIC BLOOD PRESSURE: 116 MMHG | DIASTOLIC BLOOD PRESSURE: 80 MMHG | OXYGEN SATURATION: 98 %

## 2018-01-01 VITALS
SYSTOLIC BLOOD PRESSURE: 101 MMHG | DIASTOLIC BLOOD PRESSURE: 62 MMHG | OXYGEN SATURATION: 96 % | HEART RATE: 96 BPM | BODY MASS INDEX: 21.88 KG/M2 | HEIGHT: 69 IN | RESPIRATION RATE: 16 BRPM | TEMPERATURE: 97.7 F | WEIGHT: 147.71 LBS

## 2018-01-01 VITALS
TEMPERATURE: 98.5 F | DIASTOLIC BLOOD PRESSURE: 71 MMHG | HEIGHT: 69 IN | HEART RATE: 95 BPM | WEIGHT: 149 LBS | SYSTOLIC BLOOD PRESSURE: 105 MMHG | BODY MASS INDEX: 22.07 KG/M2

## 2018-01-01 DIAGNOSIS — A49.8 INFECTION DUE TO KLEBSIELLA PNEUMONIAE: Primary | ICD-10-CM

## 2018-01-01 DIAGNOSIS — C78.6 PERITONEAL CARCINOMATOSIS (H): ICD-10-CM

## 2018-01-01 DIAGNOSIS — C18.7 CANCER OF SIGMOID COLON (H): ICD-10-CM

## 2018-01-01 DIAGNOSIS — C78.6 PERITONEAL CARCINOMATOSIS (H): Primary | ICD-10-CM

## 2018-01-01 DIAGNOSIS — Z43.1 ATTENTION TO G-TUBE (H): ICD-10-CM

## 2018-01-01 DIAGNOSIS — C18.9 COLON ADENOCARCINOMA (H): Primary | ICD-10-CM

## 2018-01-01 DIAGNOSIS — Z92.241 HISTORY OF RECENT STEROID USE: ICD-10-CM

## 2018-01-01 DIAGNOSIS — R19.7 DIARRHEA, UNSPECIFIED TYPE: ICD-10-CM

## 2018-01-01 DIAGNOSIS — K56.609 LARGE BOWEL OBSTRUCTION (H): ICD-10-CM

## 2018-01-01 DIAGNOSIS — C18.9 COLON CANCER (H): Primary | ICD-10-CM

## 2018-01-01 DIAGNOSIS — C18.7 CANCER OF SIGMOID COLON (H): Primary | ICD-10-CM

## 2018-01-01 DIAGNOSIS — R11.2 NAUSEA AND VOMITING, INTRACTABILITY OF VOMITING NOT SPECIFIED, UNSPECIFIED VOMITING TYPE: ICD-10-CM

## 2018-01-01 DIAGNOSIS — D72.829 LEUKOCYTOSIS, UNSPECIFIED TYPE: Primary | ICD-10-CM

## 2018-01-01 DIAGNOSIS — K56.609 SMALL BOWEL OBSTRUCTION (H): ICD-10-CM

## 2018-01-01 DIAGNOSIS — G89.3 NEOPLASM RELATED PAIN: Primary | ICD-10-CM

## 2018-01-01 DIAGNOSIS — R50.9 FEVER: ICD-10-CM

## 2018-01-01 DIAGNOSIS — E87.6 HYPOKALEMIA: ICD-10-CM

## 2018-01-01 DIAGNOSIS — C18.9 COLON CANCER (H): ICD-10-CM

## 2018-01-01 DIAGNOSIS — G89.3 NEOPLASM RELATED PAIN: ICD-10-CM

## 2018-01-01 DIAGNOSIS — K21.9 GASTROESOPHAGEAL REFLUX DISEASE, ESOPHAGITIS PRESENCE NOT SPECIFIED: ICD-10-CM

## 2018-01-01 DIAGNOSIS — D72.829 LEUKOCYTOSIS, UNSPECIFIED TYPE: ICD-10-CM

## 2018-01-01 DIAGNOSIS — Z51.5 ENCOUNTER FOR PALLIATIVE CARE: ICD-10-CM

## 2018-01-01 DIAGNOSIS — C18.9 COLON ADENOCARCINOMA (H): ICD-10-CM

## 2018-01-01 DIAGNOSIS — C18.9 MALIGNANT NEOPLASM OF COLON, UNSPECIFIED PART OF COLON (H): ICD-10-CM

## 2018-01-01 DIAGNOSIS — R11.0 NAUSEA: ICD-10-CM

## 2018-01-01 DIAGNOSIS — E86.0 DEHYDRATION: ICD-10-CM

## 2018-01-01 DIAGNOSIS — K52.9 COLITIS: ICD-10-CM

## 2018-01-01 DIAGNOSIS — C18.7 MALIGNANT NEOPLASM OF SIGMOID COLON (H): ICD-10-CM

## 2018-01-01 DIAGNOSIS — Z79.891 LONG TERM CURRENT USE OF OPIATE ANALGESIC: ICD-10-CM

## 2018-01-01 DIAGNOSIS — Z78.9 ON TOTAL PARENTERAL NUTRITION (TPN): ICD-10-CM

## 2018-01-01 DIAGNOSIS — D64.9 ANEMIA, UNSPECIFIED TYPE: ICD-10-CM

## 2018-01-01 DIAGNOSIS — G89.3 CANCER ASSOCIATED PAIN: ICD-10-CM

## 2018-01-01 DIAGNOSIS — E87.1 HYPONATREMIA: ICD-10-CM

## 2018-01-01 DIAGNOSIS — R78.81 BACTEREMIA: Primary | ICD-10-CM

## 2018-01-01 DIAGNOSIS — K21.9 GASTROESOPHAGEAL REFLUX DISEASE WITHOUT ESOPHAGITIS: ICD-10-CM

## 2018-01-01 DIAGNOSIS — G47.01 INSOMNIA DUE TO MEDICAL CONDITION: ICD-10-CM

## 2018-01-01 DIAGNOSIS — E86.0 DEHYDRATION: Primary | ICD-10-CM

## 2018-01-01 DIAGNOSIS — R19.7 DIARRHEA, UNSPECIFIED TYPE: Primary | ICD-10-CM

## 2018-01-01 DIAGNOSIS — R63.0 ANOREXIA: ICD-10-CM

## 2018-01-01 DIAGNOSIS — K56.609 LARGE BOWEL OBSTRUCTION (H): Primary | ICD-10-CM

## 2018-01-01 DIAGNOSIS — R10.32 ABDOMINAL PAIN, LEFT LOWER QUADRANT: ICD-10-CM

## 2018-01-01 DIAGNOSIS — D50.0 IRON DEFICIENCY ANEMIA DUE TO CHRONIC BLOOD LOSS: ICD-10-CM

## 2018-01-01 DIAGNOSIS — I87.8 POOR VENOUS ACCESS: ICD-10-CM

## 2018-01-01 DIAGNOSIS — D64.9 ANEMIA, UNSPECIFIED TYPE: Primary | ICD-10-CM

## 2018-01-01 DIAGNOSIS — C78.6 SECONDARY MALIGNANT NEOPLASM OF RETROPERITONEUM AND PERITONEUM (H): ICD-10-CM

## 2018-01-01 DIAGNOSIS — C18.9 MALIGNANT NEOPLASM OF COLON, UNSPECIFIED PART OF COLON (H): Primary | ICD-10-CM

## 2018-01-01 DIAGNOSIS — Z71.9 VISIT FOR COUNSELING: Primary | ICD-10-CM

## 2018-01-01 DIAGNOSIS — K56.609 COLONIC OBSTRUCTION (H): ICD-10-CM

## 2018-01-01 DIAGNOSIS — R50.9 FEVER, UNSPECIFIED FEVER CAUSE: ICD-10-CM

## 2018-01-01 DIAGNOSIS — Z51.5 HOSPICE CARE PATIENT: Primary | ICD-10-CM

## 2018-01-01 DIAGNOSIS — K52.9 COLITIS PRESUMED INFECTIOUS: Primary | ICD-10-CM

## 2018-01-01 DIAGNOSIS — K52.9 COLITIS: Primary | ICD-10-CM

## 2018-01-01 DIAGNOSIS — J18.9 PNEUMONIA OF LEFT LOWER LOBE DUE TO INFECTIOUS ORGANISM: ICD-10-CM

## 2018-01-01 DIAGNOSIS — D63.0 ANEMIA IN NEOPLASTIC DISEASE: ICD-10-CM

## 2018-01-01 LAB
% METAMYELOCYTES: 0
ABO + RH BLD: NORMAL
ALBUMIN SERPL-MCNC: 1.4 G/DL (ref 3.4–5)
ALBUMIN SERPL-MCNC: 1.6 G/DL (ref 3.4–5)
ALBUMIN SERPL-MCNC: 1.8 G/DL (ref 3.4–5)
ALBUMIN SERPL-MCNC: 1.8 G/DL (ref 3.4–5)
ALBUMIN SERPL-MCNC: 2 G/DL (ref 3.4–5)
ALBUMIN SERPL-MCNC: 2 G/DL (ref 3.4–5)
ALBUMIN SERPL-MCNC: 2.1 G/DL (ref 3.4–5)
ALBUMIN SERPL-MCNC: 2.1 G/DL (ref 3.4–5)
ALBUMIN SERPL-MCNC: 2.2 G/DL (ref 3.4–5)
ALBUMIN SERPL-MCNC: 2.2 G/DL (ref 3.4–5)
ALBUMIN SERPL-MCNC: 2.3 G/DL (ref 3.4–5)
ALBUMIN SERPL-MCNC: 2.4 G/DL (ref 3.4–5)
ALBUMIN SERPL-MCNC: 2.5 G/DL (ref 3.4–5)
ALBUMIN SERPL-MCNC: 2.6 G/DL (ref 3.4–5)
ALBUMIN SERPL-MCNC: 2.6 G/DL (ref 3.4–5)
ALBUMIN SERPL-MCNC: 2.6 G/DL (ref 3.5–6.2)
ALBUMIN SERPL-MCNC: 2.7 G/DL (ref 3.4–5)
ALBUMIN SERPL-MCNC: 2.8 G/DL (ref 3.4–5)
ALBUMIN SERPL-MCNC: 3 G/DL (ref 3.4–5)
ALBUMIN SERPL-MCNC: 3 G/DL (ref 3.4–5)
ALBUMIN SERPL-MCNC: 3.1 G/DL (ref 3.4–5)
ALBUMIN SERPL-MCNC: 3.2 G/DL (ref 3.4–5)
ALBUMIN SERPL-MCNC: 3.2 G/DL (ref 3.4–5)
ALBUMIN SERPL-MCNC: 3.4 G/DL
ALBUMIN SERPL-MCNC: 3.4 G/DL
ALBUMIN SERPL-MCNC: 3.4 G/DL (ref 3.4–5)
ALBUMIN SERPL-MCNC: 3.5 G/DL (ref 3.4–5)
ALBUMIN SERPL-MCNC: 3.6 G/DL
ALBUMIN SERPL-MCNC: 3.8 G/DL
ALBUMIN SERPL-MCNC: 4.2 G/DL
ALBUMIN UR-MCNC: 10 MG/DL
ALBUMIN UR-MCNC: 10 MG/DL
ALBUMIN UR-MCNC: 30 MG/DL
ALBUMIN UR-MCNC: ABNORMAL MG/DL
ALBUMIN UR-MCNC: NEGATIVE MG/DL
ALBUMIN/GLOB SERPL: 0.5 {RATIO} (ref 1–2)
ALBUMIN/GLOB SERPL: 0.8 {RATIO}
ALBUMIN/GLOB SERPL: 0.9 {RATIO}
ALBUMIN/GLOB SERPL: 0.9 {RATIO}
ALBUMIN/GLOBULIN RATIO - QUEST: 0.7
ALBUMIN/GLOBULIN RATIO - QUEST: 0.8
ALP SERPL-CCNC: 131 U/L (ref 40–150)
ALP SERPL-CCNC: 131 U/L (ref 40–150)
ALP SERPL-CCNC: 132 U/L
ALP SERPL-CCNC: 134 U/L (ref 40–150)
ALP SERPL-CCNC: 134 U/L (ref 40–150)
ALP SERPL-CCNC: 137 U/L (ref 40–150)
ALP SERPL-CCNC: 139 U/L (ref 40–150)
ALP SERPL-CCNC: 142 U/L (ref 40–150)
ALP SERPL-CCNC: 145 U/L (ref 40–150)
ALP SERPL-CCNC: 146 U/L (ref 40–150)
ALP SERPL-CCNC: 151 U/L (ref 40–150)
ALP SERPL-CCNC: 151 U/L (ref 40–150)
ALP SERPL-CCNC: 154 U/L (ref 40–150)
ALP SERPL-CCNC: 155 U/L (ref 40–150)
ALP SERPL-CCNC: 156 U/L
ALP SERPL-CCNC: 158 U/L (ref 40–150)
ALP SERPL-CCNC: 158 U/L (ref 40–150)
ALP SERPL-CCNC: 161 U/L (ref 40–150)
ALP SERPL-CCNC: 163 U/L (ref 40–150)
ALP SERPL-CCNC: 167 U/L (ref 40–150)
ALP SERPL-CCNC: 167 U/L (ref 40–150)
ALP SERPL-CCNC: 169 U/L (ref 40–150)
ALP SERPL-CCNC: 170 U/L
ALP SERPL-CCNC: 181 U/L (ref 40–150)
ALP SERPL-CCNC: 182 U/L (ref 40–150)
ALP SERPL-CCNC: 183 U/L (ref 40–150)
ALP SERPL-CCNC: 194 U/L (ref 40–150)
ALP SERPL-CCNC: 203 U/L
ALP SERPL-CCNC: 204 U/L (ref 40–150)
ALP SERPL-CCNC: 207 U/L (ref 40–150)
ALP SERPL-CCNC: 213 U/L (ref 40–150)
ALP SERPL-CCNC: 220 U/L (ref 40–150)
ALP SERPL-CCNC: 228 U/L (ref 40–150)
ALP SERPL-CCNC: 229 U/L (ref 40–150)
ALP SERPL-CCNC: 248 U/L (ref 40–150)
ALP SERPL-CCNC: 259 U/L
ALP SERPL-CCNC: 349 U/L
ALT SERPL W P-5'-P-CCNC: 10 U/L (ref 0–70)
ALT SERPL W P-5'-P-CCNC: 12 U/L (ref 0–70)
ALT SERPL W P-5'-P-CCNC: 12 U/L (ref 0–70)
ALT SERPL W P-5'-P-CCNC: 14 U/L (ref 0–70)
ALT SERPL W P-5'-P-CCNC: 15 U/L (ref 0–70)
ALT SERPL W P-5'-P-CCNC: 15 U/L (ref 0–70)
ALT SERPL W P-5'-P-CCNC: 17 U/L (ref 0–70)
ALT SERPL W P-5'-P-CCNC: 18 U/L (ref 0–70)
ALT SERPL W P-5'-P-CCNC: 20 U/L (ref 0–70)
ALT SERPL W P-5'-P-CCNC: 20 U/L (ref 0–70)
ALT SERPL W P-5'-P-CCNC: 22 U/L (ref 0–70)
ALT SERPL W P-5'-P-CCNC: 22 U/L (ref 0–70)
ALT SERPL W P-5'-P-CCNC: 23 U/L (ref 0–70)
ALT SERPL W P-5'-P-CCNC: 28 U/L (ref 0–70)
ALT SERPL W P-5'-P-CCNC: 31 U/L (ref 0–70)
ALT SERPL W P-5'-P-CCNC: 33 U/L (ref 0–70)
ALT SERPL W P-5'-P-CCNC: 40 U/L (ref 0–70)
ALT SERPL W P-5'-P-CCNC: 41 U/L (ref 0–70)
ALT SERPL W P-5'-P-CCNC: 49 U/L (ref 0–70)
ALT SERPL W P-5'-P-CCNC: 50 U/L (ref 0–70)
ALT SERPL W P-5'-P-CCNC: 53 U/L (ref 0–70)
ALT SERPL W P-5'-P-CCNC: 53 U/L (ref 0–70)
ALT SERPL W P-5'-P-CCNC: 55 U/L (ref 0–70)
ALT SERPL W P-5'-P-CCNC: 61 U/L (ref 0–70)
ALT SERPL W P-5'-P-CCNC: 65 U/L (ref 0–70)
ALT SERPL W P-5'-P-CCNC: 66 U/L (ref 0–70)
ALT SERPL W P-5'-P-CCNC: 74 U/L (ref 0–70)
ALT SERPL-CCNC: 14 U/L
ALT SERPL-CCNC: 33 U/L
ALT SERPL-CCNC: 38 U/L
ALT SERPL-CCNC: 39 U/L
ALT SERPL-CCNC: 50 U/L
ALT SERPL-CCNC: 85 U/L
ANION GAP SERPL CALCULATED.3IONS-SCNC: 10 MMOL/L
ANION GAP SERPL CALCULATED.3IONS-SCNC: 10 MMOL/L (ref 3–14)
ANION GAP SERPL CALCULATED.3IONS-SCNC: 11 MMOL/L
ANION GAP SERPL CALCULATED.3IONS-SCNC: 11 MMOL/L (ref 3–14)
ANION GAP SERPL CALCULATED.3IONS-SCNC: 12 MMOL/L
ANION GAP SERPL CALCULATED.3IONS-SCNC: 12 MMOL/L (ref 3–14)
ANION GAP SERPL CALCULATED.3IONS-SCNC: 13 MMOL/L (ref 3–14)
ANION GAP SERPL CALCULATED.3IONS-SCNC: 14 MMOL/L
ANION GAP SERPL CALCULATED.3IONS-SCNC: 14 MMOL/L (ref 3–14)
ANION GAP SERPL CALCULATED.3IONS-SCNC: 15 MMOL/L
ANION GAP SERPL CALCULATED.3IONS-SCNC: 15 MMOL/L (ref 3–14)
ANION GAP SERPL CALCULATED.3IONS-SCNC: 4 MMOL/L (ref 3–14)
ANION GAP SERPL CALCULATED.3IONS-SCNC: 5 MMOL/L (ref 3–14)
ANION GAP SERPL CALCULATED.3IONS-SCNC: 5 MMOL/L (ref 3–14)
ANION GAP SERPL CALCULATED.3IONS-SCNC: 6 MMOL/L (ref 3–14)
ANION GAP SERPL CALCULATED.3IONS-SCNC: 7 MMOL/L (ref 3–14)
ANION GAP SERPL CALCULATED.3IONS-SCNC: 8 MMOL/L (ref 3–14)
ANION GAP SERPL CALCULATED.3IONS-SCNC: 9 MMOL/L
ANION GAP SERPL CALCULATED.3IONS-SCNC: 9 MMOL/L (ref 3–14)
ANISOCYTOSIS BLD QL SMEAR: ABNORMAL
ANISOCYTOSIS BLD QL SMEAR: SLIGHT
APPEARANCE UR: ABNORMAL
APPEARANCE UR: ABNORMAL
APPEARANCE UR: CLEAR
APTT PPP: 37 SEC (ref 22–37)
AST SERPL W P-5'-P-CCNC: 12 U/L (ref 0–45)
AST SERPL W P-5'-P-CCNC: 15 U/L (ref 0–45)
AST SERPL W P-5'-P-CCNC: 15 U/L (ref 0–45)
AST SERPL W P-5'-P-CCNC: 16 U/L (ref 0–45)
AST SERPL W P-5'-P-CCNC: 17 U/L (ref 0–45)
AST SERPL W P-5'-P-CCNC: 18 U/L (ref 0–45)
AST SERPL W P-5'-P-CCNC: 19 U/L (ref 0–45)
AST SERPL W P-5'-P-CCNC: 19 U/L (ref 0–45)
AST SERPL W P-5'-P-CCNC: 20 U/L (ref 0–45)
AST SERPL W P-5'-P-CCNC: 21 U/L (ref 0–45)
AST SERPL W P-5'-P-CCNC: 21 U/L (ref 0–45)
AST SERPL W P-5'-P-CCNC: 22 U/L (ref 0–45)
AST SERPL W P-5'-P-CCNC: 23 U/L (ref 0–45)
AST SERPL W P-5'-P-CCNC: 23 U/L (ref 0–45)
AST SERPL W P-5'-P-CCNC: 24 U/L (ref 0–45)
AST SERPL W P-5'-P-CCNC: 24 U/L (ref 0–45)
AST SERPL W P-5'-P-CCNC: 26 U/L (ref 0–45)
AST SERPL W P-5'-P-CCNC: 26 U/L (ref 0–45)
AST SERPL W P-5'-P-CCNC: 29 U/L (ref 0–45)
AST SERPL W P-5'-P-CCNC: 30 U/L (ref 0–45)
AST SERPL W P-5'-P-CCNC: 30 U/L (ref 0–45)
AST SERPL W P-5'-P-CCNC: 32 U/L (ref 0–45)
AST SERPL W P-5'-P-CCNC: 32 U/L (ref 0–45)
AST SERPL W P-5'-P-CCNC: 33 U/L (ref 0–45)
AST SERPL W P-5'-P-CCNC: 34 U/L (ref 0–45)
AST SERPL W P-5'-P-CCNC: 34 U/L (ref 0–45)
AST SERPL W P-5'-P-CCNC: 38 U/L (ref 0–45)
AST SERPL W P-5'-P-CCNC: 43 U/L (ref 0–45)
AST SERPL W P-5'-P-CCNC: 46 U/L (ref 0–45)
AST SERPL W P-5'-P-CCNC: 46 U/L (ref 0–45)
AST SERPL W P-5'-P-CCNC: 49 U/L (ref 0–45)
AST SERPL-CCNC: 24 U/L
AST SERPL-CCNC: 29 U/L
AST SERPL-CCNC: 30 U/L
AST SERPL-CCNC: 31 U/L
AST SERPL-CCNC: 62 U/L
AST SERPL-CCNC: 73 U/L
BACTERIA #/AREA URNS HPF: ABNORMAL /HPF
BACTERIA SPEC CULT: ABNORMAL
BACTERIA SPEC CULT: NO GROWTH
BACTERIA SPEC CULT: NORMAL
BACTERIA SPEC CULT: NORMAL
BASOPHILS # BLD AUTO: 0 10*3/UL
BASOPHILS # BLD AUTO: 0 10E9/L (ref 0–0.2)
BASOPHILS # BLD AUTO: 0.1 10*3/UL
BASOPHILS # BLD AUTO: 0.1 10*3/UL
BASOPHILS # BLD AUTO: 0.1 10E9/L (ref 0–0.2)
BASOPHILS # BLD AUTO: 0.2 10E9/L (ref 0–0.2)
BASOPHILS NFR BLD AUTO: 0 %
BASOPHILS NFR BLD AUTO: 0.1 %
BASOPHILS NFR BLD AUTO: 0.2 %
BASOPHILS NFR BLD AUTO: 0.3 %
BASOPHILS NFR BLD AUTO: 0.4 %
BASOPHILS NFR BLD AUTO: 0.5 %
BASOPHILS NFR BLD AUTO: 0.6 %
BASOPHILS NFR BLD AUTO: 0.7 %
BASOPHILS NFR BLD AUTO: 0.8 %
BASOPHILS NFR BLD AUTO: 0.8 %
BILIRUB DIRECT SERPL-MCNC: 0.3 MG/DL (ref 0–0.2)
BILIRUB SERPL-MCNC: 0.2 MG/DL (ref 0.2–1.3)
BILIRUB SERPL-MCNC: 0.3 MG/DL
BILIRUB SERPL-MCNC: 0.3 MG/DL (ref 0.2–1.3)
BILIRUB SERPL-MCNC: 0.4 MG/DL
BILIRUB SERPL-MCNC: 0.4 MG/DL (ref 0.2–1.3)
BILIRUB SERPL-MCNC: 0.5 MG/DL
BILIRUB SERPL-MCNC: 0.5 MG/DL
BILIRUB SERPL-MCNC: 0.5 MG/DL (ref 0.2–1.3)
BILIRUB SERPL-MCNC: 0.5 MG/DL (ref 0.2–1.3)
BILIRUB SERPL-MCNC: 0.6 MG/DL (ref 0.2–1.3)
BILIRUB SERPL-MCNC: 0.7 MG/DL (ref 0.2–1.3)
BILIRUB SERPL-MCNC: 0.9 MG/DL (ref 0.2–1.3)
BILIRUB SERPL-MCNC: 0.9 MG/DL (ref 0.2–1.3)
BILIRUB UR QL STRIP: ABNORMAL
BILIRUB UR QL STRIP: NEGATIVE
BILIRUBIN DIRECT: 0.2 MG/DL
BILIRUBIN DIRECT: 0.3 MG/DL
BILIRUBIN DIRECT: 0.3 MG/DL
BILIRUBIN,INDIRECT: 0.1 MG/DL
BILIRUBIN,INDIRECT: 0.2 MG/DL
BLD GP AB SCN SERPL QL: NORMAL
BLD PROD TYP BPU: NORMAL
BLD UNIT ID BPU: 0
BLOOD BANK CMNT PATIENT-IMP: NORMAL
BLOOD PRODUCT CODE: NORMAL
BPU ID: NORMAL
BUN SERPL-MCNC: 10 MG/DL (ref 7–30)
BUN SERPL-MCNC: 11 MG/DL (ref 7–30)
BUN SERPL-MCNC: 12 MG/DL (ref 7–30)
BUN SERPL-MCNC: 14 MG/DL (ref 7–30)
BUN SERPL-MCNC: 15 MG/DL (ref 7–30)
BUN SERPL-MCNC: 15 MG/DL (ref 7–30)
BUN SERPL-MCNC: 16 MG/DL (ref 7–30)
BUN SERPL-MCNC: 17 MG/DL (ref 7–30)
BUN SERPL-MCNC: 18 MG/DL
BUN SERPL-MCNC: 18 MG/DL (ref 7–30)
BUN SERPL-MCNC: 20 MG/DL (ref 7–30)
BUN SERPL-MCNC: 21 MG/DL (ref 7–30)
BUN SERPL-MCNC: 22 MG/DL (ref 7–30)
BUN SERPL-MCNC: 22 MG/DL (ref 7–30)
BUN SERPL-MCNC: 24 MG/DL
BUN SERPL-MCNC: 24 MG/DL (ref 7–30)
BUN SERPL-MCNC: 25 MG/DL
BUN SERPL-MCNC: 25 MG/DL (ref 7–30)
BUN SERPL-MCNC: 25 MG/DL (ref 7–30)
BUN SERPL-MCNC: 26 MG/DL (ref 7–30)
BUN SERPL-MCNC: 26 MG/DL (ref 7–30)
BUN SERPL-MCNC: 27 MG/DL (ref 7–30)
BUN SERPL-MCNC: 27 MG/DL (ref 7–30)
BUN SERPL-MCNC: 28 MG/DL (ref 7–30)
BUN SERPL-MCNC: 29 MG/DL (ref 7–30)
BUN SERPL-MCNC: 30 MG/DL
BUN SERPL-MCNC: 30 MG/DL (ref 7–30)
BUN SERPL-MCNC: 30 MG/DL (ref 7–30)
BUN SERPL-MCNC: 31 MG/DL (ref 7–30)
BUN SERPL-MCNC: 32 MG/DL (ref 7–30)
BUN SERPL-MCNC: 33 MG/DL (ref 7–30)
BUN SERPL-MCNC: 33 MG/DL (ref 7–30)
BUN SERPL-MCNC: 36 MG/DL (ref 7–30)
BUN SERPL-MCNC: 36 MG/DL (ref 7–30)
BUN SERPL-MCNC: 37 MG/DL
BUN SERPL-MCNC: 37 MG/DL (ref 7–30)
BUN SERPL-MCNC: 39 MG/DL (ref 7–30)
BUN SERPL-MCNC: 40 MG/DL
BUN SERPL-MCNC: 40 MG/DL (ref 7–30)
BUN SERPL-MCNC: 40 MG/DL (ref 7–30)
BUN SERPL-MCNC: 47 MG/DL (ref 7–30)
BUN SERPL-MCNC: 54 MG/DL (ref 7–30)
BUN SERPL-MCNC: 67 MG/DL (ref 7–30)
BUN SERPL-MCNC: 7 MG/DL (ref 7–30)
BUN SERPL-MCNC: 76 MG/DL (ref 7–30)
BUN SERPL-MCNC: 8 MG/DL (ref 7–30)
BUN SERPL-MCNC: 80 MG/DL (ref 7–30)
BUN SERPL-MCNC: 9 MG/DL (ref 7–30)
BUN SERPL-MCNC: 95 MG/DL (ref 7–30)
BUN SERPL-MCNC: 97 MG/DL (ref 7–30)
BUN SERPL-MCNC: 98 MG/DL (ref 7–30)
BUN/CREATININE RATIO: 24 (ref 10–20)
BUN/CREATININE RATIO: 28
BUN/CREATININE RATIO: 29
BUN/CREATININE RATIO: 31
BUN/CREATININE RATIO: 32
BUN/CREATININE RATIO: 36
C COLI+JEJUNI+LARI FUSA STL QL NAA+PROBE: NOT DETECTED
C DIFF TOX B STL QL: NEGATIVE
CALCIUM SERPL-MCNC: 10.3 MG/DL
CALCIUM SERPL-MCNC: 7.6 MG/DL (ref 8.5–10.1)
CALCIUM SERPL-MCNC: 7.7 MG/DL (ref 8.5–10.1)
CALCIUM SERPL-MCNC: 7.8 MG/DL (ref 8.5–10.1)
CALCIUM SERPL-MCNC: 7.8 MG/DL (ref 8.5–10.1)
CALCIUM SERPL-MCNC: 7.9 MG/DL (ref 8.5–10.1)
CALCIUM SERPL-MCNC: 8 MG/DL (ref 8.5–10.1)
CALCIUM SERPL-MCNC: 8 MG/DL (ref 8.5–10.1)
CALCIUM SERPL-MCNC: 8.1 MG/DL (ref 8.5–10.1)
CALCIUM SERPL-MCNC: 8.2 MG/DL (ref 8.5–10.1)
CALCIUM SERPL-MCNC: 8.3 MG/DL (ref 8.5–10.1)
CALCIUM SERPL-MCNC: 8.4 MG/DL (ref 8.5–10.1)
CALCIUM SERPL-MCNC: 8.5 MG/DL (ref 8.5–10.1)
CALCIUM SERPL-MCNC: 8.6 MG/DL (ref 8.5–10.1)
CALCIUM SERPL-MCNC: 8.7 MG/DL (ref 8.5–10.1)
CALCIUM SERPL-MCNC: 8.8 MG/DL (ref 8.5–10.1)
CALCIUM SERPL-MCNC: 8.9 MG/DL (ref 8.5–10.1)
CALCIUM SERPL-MCNC: 9 MG/DL (ref 8.5–10.1)
CALCIUM SERPL-MCNC: 9.1 MG/DL
CALCIUM SERPL-MCNC: 9.1 MG/DL (ref 8.5–10.1)
CALCIUM SERPL-MCNC: 9.2 MG/DL (ref 8.5–10.1)
CALCIUM SERPL-MCNC: 9.3 MG/DL
CALCIUM SERPL-MCNC: 9.3 MG/DL (ref 8.5–10.1)
CALCIUM SERPL-MCNC: 9.4 MG/DL
CALCIUM SERPL-MCNC: 9.4 MG/DL (ref 8.5–10.1)
CALCIUM SERPL-MCNC: 9.5 MG/DL (ref 8.5–10.1)
CALCIUM SERPL-MCNC: 9.5 MG/DL (ref 8.5–10.1)
CALCIUM SERPL-MCNC: 9.6 MG/DL (ref 8.5–10.1)
CALCIUM SERPL-MCNC: 9.7 MG/DL
CALCIUM SERPL-MCNC: 9.7 MG/DL
CALCIUM SERPL-MCNC: 9.7 MG/DL (ref 8.5–10.1)
CALCIUM SERPL-MCNC: 9.7 MG/DL (ref 8.5–10.1)
CALCIUM SERPL-MCNC: 9.8 MG/DL (ref 8.5–10.1)
CHLORIDE SERPL-SCNC: 100 MMOL/L (ref 94–109)
CHLORIDE SERPL-SCNC: 101 MMOL/L (ref 94–109)
CHLORIDE SERPL-SCNC: 102 MMOL/L (ref 94–109)
CHLORIDE SERPL-SCNC: 103 MMOL/L (ref 94–109)
CHLORIDE SERPL-SCNC: 104 MMOL/L (ref 94–109)
CHLORIDE SERPL-SCNC: 105 MMOL/L (ref 94–109)
CHLORIDE SERPL-SCNC: 106 MMOL/L (ref 94–109)
CHLORIDE SERPL-SCNC: 107 MMOL/L (ref 94–109)
CHLORIDE SERPL-SCNC: 108 MMOL/L (ref 94–109)
CHLORIDE SERPL-SCNC: 108 MMOL/L (ref 94–109)
CHLORIDE SERPL-SCNC: 110 MMOL/L (ref 94–109)
CHLORIDE SERPL-SCNC: 75 MMOL/L (ref 94–109)
CHLORIDE SERPL-SCNC: 80 MMOL/L (ref 94–109)
CHLORIDE SERPL-SCNC: 81 MMOL/L (ref 94–109)
CHLORIDE SERPL-SCNC: 81 MMOL/L (ref 94–109)
CHLORIDE SERPL-SCNC: 84 MMOL/L (ref 94–109)
CHLORIDE SERPL-SCNC: 84 MMOL/L (ref 94–109)
CHLORIDE SERPL-SCNC: 86 MMOL/L (ref 94–109)
CHLORIDE SERPL-SCNC: 87 MMOL/L (ref 94–109)
CHLORIDE SERPL-SCNC: 91 MMOL/L (ref 94–109)
CHLORIDE SERPL-SCNC: 92 MMOL/L (ref 94–109)
CHLORIDE SERPL-SCNC: 92 MMOL/L (ref 94–109)
CHLORIDE SERPL-SCNC: 93 MMOL/L (ref 94–109)
CHLORIDE SERPL-SCNC: 93 MMOL/L (ref 94–109)
CHLORIDE SERPL-SCNC: 94 MMOL/L (ref 94–109)
CHLORIDE SERPL-SCNC: 94 MMOL/L (ref 94–109)
CHLORIDE SERPL-SCNC: 95 MMOL/L (ref 94–109)
CHLORIDE SERPL-SCNC: 95 MMOL/L (ref 94–109)
CHLORIDE SERPL-SCNC: 96 MMOL/L (ref 94–109)
CHLORIDE SERPL-SCNC: 97 MMOL/L (ref 94–109)
CHLORIDE SERPL-SCNC: 97 MMOL/L (ref 94–109)
CHLORIDE SERPL-SCNC: 98 MMOL/L (ref 94–109)
CHLORIDE SERPL-SCNC: 98 MMOL/L (ref 94–109)
CHLORIDE SERPL-SCNC: 99 MMOL/L (ref 94–109)
CHLORIDE SERPLBLD-SCNC: 102 MMOL/L
CHLORIDE SERPLBLD-SCNC: 103 MMOL/L
CHLORIDE SERPLBLD-SCNC: 105 MMOL/L
CHLORIDE SERPLBLD-SCNC: 95 MMOL/L
CHOLEST SERPL-MCNC: NORMAL MG/DL
CO2 BLDCOV-SCNC: 22 MMOL/L (ref 21–28)
CO2 SERPL-SCNC: 18 MMOL/L (ref 20–32)
CO2 SERPL-SCNC: 19 MMOL/L (ref 20–32)
CO2 SERPL-SCNC: 21 MMOL/L (ref 20–32)
CO2 SERPL-SCNC: 21 MMOL/L (ref 20–32)
CO2 SERPL-SCNC: 22 MMOL/L
CO2 SERPL-SCNC: 22 MMOL/L (ref 20–32)
CO2 SERPL-SCNC: 23 MMOL/L
CO2 SERPL-SCNC: 23 MMOL/L (ref 20–32)
CO2 SERPL-SCNC: 24 MMOL/L
CO2 SERPL-SCNC: 24 MMOL/L (ref 20–32)
CO2 SERPL-SCNC: 25 MMOL/L (ref 20–32)
CO2 SERPL-SCNC: 26 MMOL/L (ref 20–32)
CO2 SERPL-SCNC: 27 MMOL/L
CO2 SERPL-SCNC: 27 MMOL/L (ref 20–32)
CO2 SERPL-SCNC: 28 MMOL/L (ref 20–32)
CO2 SERPL-SCNC: 30 MMOL/L (ref 20–32)
CO2 SERPL-SCNC: 30 MMOL/L (ref 20–32)
CO2 SERPL-SCNC: 31 MMOL/L (ref 20–32)
CO2 SERPL-SCNC: 32 MMOL/L (ref 20–32)
CO2 SERPL-SCNC: 34 MMOL/L (ref 20–32)
CO2 SERPL-SCNC: 34 MMOL/L (ref 20–32)
CO2 SERPL-SCNC: 35 MMOL/L (ref 20–32)
CO2 SERPL-SCNC: 35 MMOL/L (ref 20–32)
CO2 SERPL-SCNC: 36 MMOL/L (ref 20–32)
COLONOSCOPY: NORMAL
COLOR UR AUTO: ABNORMAL
COLOR UR AUTO: ABNORMAL
COLOR UR AUTO: YELLOW
CREAT BLD-MCNC: 1.1 MG/DL (ref 0.66–1.25)
CREAT SERPL-MCNC: 0.56 MG/DL (ref 0.66–1.25)
CREAT SERPL-MCNC: 0.57 MG/DL (ref 0.66–1.25)
CREAT SERPL-MCNC: 0.59 MG/DL (ref 0.66–1.25)
CREAT SERPL-MCNC: 0.6 MG/DL (ref 0.66–1.25)
CREAT SERPL-MCNC: 0.61 MG/DL (ref 0.66–1.25)
CREAT SERPL-MCNC: 0.62 MG/DL (ref 0.66–1.25)
CREAT SERPL-MCNC: 0.63 MG/DL (ref 0.66–1.25)
CREAT SERPL-MCNC: 0.64 MG/DL (ref 0.66–1.25)
CREAT SERPL-MCNC: 0.65 MG/DL (ref 0.66–1.25)
CREAT SERPL-MCNC: 0.65 MG/DL (ref 0.66–1.25)
CREAT SERPL-MCNC: 0.7 MG/DL (ref 0.66–1.25)
CREAT SERPL-MCNC: 0.72 MG/DL (ref 0.66–1.25)
CREAT SERPL-MCNC: 0.73 MG/DL (ref 0.66–1.25)
CREAT SERPL-MCNC: 0.74 MG/DL
CREAT SERPL-MCNC: 0.74 MG/DL (ref 0.66–1.25)
CREAT SERPL-MCNC: 0.75 MG/DL (ref 0.66–1.25)
CREAT SERPL-MCNC: 0.76 MG/DL (ref 0.66–1.25)
CREAT SERPL-MCNC: 0.77 MG/DL (ref 0.66–1.25)
CREAT SERPL-MCNC: 0.78 MG/DL (ref 0.66–1.25)
CREAT SERPL-MCNC: 0.78 MG/DL (ref 0.66–1.25)
CREAT SERPL-MCNC: 0.79 MG/DL
CREAT SERPL-MCNC: 0.79 MG/DL (ref 0.66–1.25)
CREAT SERPL-MCNC: 0.8 MG/DL (ref 0.66–1.25)
CREAT SERPL-MCNC: 0.82 MG/DL (ref 0.66–1.25)
CREAT SERPL-MCNC: 0.83 MG/DL
CREAT SERPL-MCNC: 0.83 MG/DL (ref 0.66–1.25)
CREAT SERPL-MCNC: 0.84 MG/DL (ref 0.66–1.25)
CREAT SERPL-MCNC: 0.84 MG/DL (ref 0.66–1.25)
CREAT SERPL-MCNC: 0.86 MG/DL
CREAT SERPL-MCNC: 0.86 MG/DL (ref 0.66–1.25)
CREAT SERPL-MCNC: 0.87 MG/DL (ref 0.66–1.25)
CREAT SERPL-MCNC: 0.9 MG/DL (ref 0.66–1.25)
CREAT SERPL-MCNC: 0.9 MG/DL (ref 0.66–1.25)
CREAT SERPL-MCNC: 0.91 MG/DL (ref 0.66–1.25)
CREAT SERPL-MCNC: 0.94 MG/DL (ref 0.66–1.25)
CREAT SERPL-MCNC: 0.96 MG/DL (ref 0.66–1.25)
CREAT SERPL-MCNC: 0.96 MG/DL (ref 0.66–1.25)
CREAT SERPL-MCNC: 0.97 MG/DL (ref 0.66–1.25)
CREAT SERPL-MCNC: 1.01 MG/DL (ref 0.66–1.25)
CREAT SERPL-MCNC: 1.01 MG/DL (ref 0.66–1.25)
CREAT SERPL-MCNC: 1.03 MG/DL (ref 0.66–1.25)
CREAT SERPL-MCNC: 1.03 MG/DL (ref 0.66–1.25)
CREAT SERPL-MCNC: 1.05 MG/DL (ref 0.66–1.25)
CREAT SERPL-MCNC: 1.05 MG/DL (ref 0.66–1.25)
CREAT SERPL-MCNC: 1.08 MG/DL (ref 0.66–1.25)
CREAT SERPL-MCNC: 1.09 MG/DL (ref 0.66–1.25)
CREAT SERPL-MCNC: 1.18 MG/DL (ref 0.66–1.25)
CREAT SERPL-MCNC: 1.19 MG/DL
CREAT SERPL-MCNC: 1.28 MG/DL (ref 0.66–1.25)
CREAT SERPL-MCNC: 1.31 MG/DL (ref 0.66–1.25)
CREAT SERPL-MCNC: 1.4 MG/DL
CREAT SERPL-MCNC: 1.57 MG/DL (ref 0.66–1.25)
CREAT SERPL-MCNC: 1.6 MG/DL (ref 0.66–1.25)
CREAT SERPL-MCNC: 2.03 MG/DL (ref 0.66–1.25)
CREAT SERPL-MCNC: 2.15 MG/DL (ref 0.66–1.25)
CREAT SERPL-MCNC: 2.66 MG/DL (ref 0.66–1.25)
DIFFERENTIAL METHOD BLD: ABNORMAL
EC STX1 GENE STL QL NAA+PROBE: NOT DETECTED
EC STX2 GENE STL QL NAA+PROBE: NOT DETECTED
ELLIPTOCYTES: ABNORMAL
ENTERIC PATHOGEN COMMENT: NORMAL
EOSINOPHIL # BLD AUTO: 0 10E9/L (ref 0–0.7)
EOSINOPHIL # BLD AUTO: 0.1 10E9/L (ref 0–0.7)
EOSINOPHIL # BLD AUTO: 0.2 10*3/UL
EOSINOPHIL # BLD AUTO: 0.2 10E9/L (ref 0–0.7)
EOSINOPHIL # BLD AUTO: 0.3 10*3/UL
EOSINOPHIL # BLD AUTO: 0.3 10E9/L (ref 0–0.7)
EOSINOPHIL # BLD AUTO: 0.4 10*3/UL
EOSINOPHIL # BLD AUTO: 0.4 10*3/UL
EOSINOPHIL # BLD AUTO: 0.4 10E9/L (ref 0–0.7)
EOSINOPHIL # BLD AUTO: 0.4 10E9/L (ref 0–0.7)
EOSINOPHIL # BLD AUTO: 0.5 10E9/L (ref 0–0.7)
EOSINOPHIL # BLD AUTO: 0.6 10*3/UL
EOSINOPHIL # BLD AUTO: 0.6 10E9/L (ref 0–0.7)
EOSINOPHIL # BLD AUTO: 0.7 10E9/L (ref 0–0.7)
EOSINOPHIL # BLD AUTO: 0.7 10E9/L (ref 0–0.7)
EOSINOPHIL # BLD AUTO: 0.9 10E9/L (ref 0–0.7)
EOSINOPHIL # BLD AUTO: 0.9 10E9/L (ref 0–0.7)
EOSINOPHIL # BLD AUTO: 1 10E9/L (ref 0–0.7)
EOSINOPHIL # BLD AUTO: 1 10E9/L (ref 0–0.7)
EOSINOPHIL # BLD AUTO: 1.4 10E9/L (ref 0–0.7)
EOSINOPHIL # BLD AUTO: 2 10E9/L (ref 0–0.7)
EOSINOPHIL NFR BLD AUTO: 0 %
EOSINOPHIL NFR BLD AUTO: 0.2 %
EOSINOPHIL NFR BLD AUTO: 0.2 %
EOSINOPHIL NFR BLD AUTO: 0.3 %
EOSINOPHIL NFR BLD AUTO: 0.4 %
EOSINOPHIL NFR BLD AUTO: 0.5 %
EOSINOPHIL NFR BLD AUTO: 0.7 %
EOSINOPHIL NFR BLD AUTO: 0.8 %
EOSINOPHIL NFR BLD AUTO: 0.9 %
EOSINOPHIL NFR BLD AUTO: 1 %
EOSINOPHIL NFR BLD AUTO: 1.1 %
EOSINOPHIL NFR BLD AUTO: 1.2 %
EOSINOPHIL NFR BLD AUTO: 1.2 %
EOSINOPHIL NFR BLD AUTO: 1.5 %
EOSINOPHIL NFR BLD AUTO: 1.7 %
EOSINOPHIL NFR BLD AUTO: 1.8 %
EOSINOPHIL NFR BLD AUTO: 1.9 %
EOSINOPHIL NFR BLD AUTO: 16.5 %
EOSINOPHIL NFR BLD AUTO: 2 %
EOSINOPHIL NFR BLD AUTO: 2.1 %
EOSINOPHIL NFR BLD AUTO: 2.2 %
EOSINOPHIL NFR BLD AUTO: 2.5 %
EOSINOPHIL NFR BLD AUTO: 2.7 %
EOSINOPHIL NFR BLD AUTO: 3.1 %
EOSINOPHIL NFR BLD AUTO: 3.1 %
EOSINOPHIL NFR BLD AUTO: 3.2 %
EOSINOPHIL NFR BLD AUTO: 3.5 %
EOSINOPHIL NFR BLD AUTO: 4 %
EOSINOPHIL NFR BLD AUTO: 4.2 %
EOSINOPHIL NFR BLD AUTO: 4.2 %
EOSINOPHIL NFR BLD AUTO: 4.4 %
EOSINOPHIL NFR BLD AUTO: 4.8 %
EOSINOPHIL NFR BLD AUTO: 4.8 %
EOSINOPHIL NFR BLD AUTO: 5 %
EOSINOPHIL NFR BLD AUTO: 5 %
EOSINOPHIL NFR BLD AUTO: 5.2 %
EOSINOPHIL NFR BLD AUTO: 6 %
EOSINOPHIL NFR BLD AUTO: 7.2 %
EOSINOPHIL NFR BLD AUTO: 7.4 %
EOSINOPHIL NFR BLD AUTO: 7.8 %
EOSINOPHIL NFR BLD AUTO: 8 %
EOSINOPHIL NFR BLD AUTO: 8.8 %
EOSINOPHIL NFR BLD AUTO: 9.7 %
ERYTHROCYTE [DISTWIDTH] IN BLOOD BY AUTOMATED COUNT: 15.4 % (ref 10–15)
ERYTHROCYTE [DISTWIDTH] IN BLOOD BY AUTOMATED COUNT: 15.5 % (ref 10–15)
ERYTHROCYTE [DISTWIDTH] IN BLOOD BY AUTOMATED COUNT: 15.7 % (ref 10–15)
ERYTHROCYTE [DISTWIDTH] IN BLOOD BY AUTOMATED COUNT: 15.8 % (ref 10–15)
ERYTHROCYTE [DISTWIDTH] IN BLOOD BY AUTOMATED COUNT: 15.9 % (ref 10–15)
ERYTHROCYTE [DISTWIDTH] IN BLOOD BY AUTOMATED COUNT: 15.9 % (ref 10–15)
ERYTHROCYTE [DISTWIDTH] IN BLOOD BY AUTOMATED COUNT: 16.1 %
ERYTHROCYTE [DISTWIDTH] IN BLOOD BY AUTOMATED COUNT: 16.1 % (ref 10–15)
ERYTHROCYTE [DISTWIDTH] IN BLOOD BY AUTOMATED COUNT: 16.1 % (ref 10–15)
ERYTHROCYTE [DISTWIDTH] IN BLOOD BY AUTOMATED COUNT: 16.2 %
ERYTHROCYTE [DISTWIDTH] IN BLOOD BY AUTOMATED COUNT: 16.2 % (ref 10–15)
ERYTHROCYTE [DISTWIDTH] IN BLOOD BY AUTOMATED COUNT: 16.4 % (ref 10–15)
ERYTHROCYTE [DISTWIDTH] IN BLOOD BY AUTOMATED COUNT: 16.4 % (ref 10–15)
ERYTHROCYTE [DISTWIDTH] IN BLOOD BY AUTOMATED COUNT: 16.5 %
ERYTHROCYTE [DISTWIDTH] IN BLOOD BY AUTOMATED COUNT: 16.5 % (ref 10–15)
ERYTHROCYTE [DISTWIDTH] IN BLOOD BY AUTOMATED COUNT: 16.6 %
ERYTHROCYTE [DISTWIDTH] IN BLOOD BY AUTOMATED COUNT: 16.6 % (ref 10–15)
ERYTHROCYTE [DISTWIDTH] IN BLOOD BY AUTOMATED COUNT: 16.6 % (ref 10–15)
ERYTHROCYTE [DISTWIDTH] IN BLOOD BY AUTOMATED COUNT: 16.7 % (ref 10–15)
ERYTHROCYTE [DISTWIDTH] IN BLOOD BY AUTOMATED COUNT: 16.8 % (ref 10–15)
ERYTHROCYTE [DISTWIDTH] IN BLOOD BY AUTOMATED COUNT: 17 % (ref 10–15)
ERYTHROCYTE [DISTWIDTH] IN BLOOD BY AUTOMATED COUNT: 17 % (ref 10–15)
ERYTHROCYTE [DISTWIDTH] IN BLOOD BY AUTOMATED COUNT: 17.2 % (ref 10–15)
ERYTHROCYTE [DISTWIDTH] IN BLOOD BY AUTOMATED COUNT: 17.2 % (ref 10–15)
ERYTHROCYTE [DISTWIDTH] IN BLOOD BY AUTOMATED COUNT: 17.3 % (ref 10–15)
ERYTHROCYTE [DISTWIDTH] IN BLOOD BY AUTOMATED COUNT: 17.4 % (ref 10–15)
ERYTHROCYTE [DISTWIDTH] IN BLOOD BY AUTOMATED COUNT: 17.4 % (ref 10–15)
ERYTHROCYTE [DISTWIDTH] IN BLOOD BY AUTOMATED COUNT: 17.5 % (ref 10–15)
ERYTHROCYTE [DISTWIDTH] IN BLOOD BY AUTOMATED COUNT: 17.5 % (ref 10–15)
ERYTHROCYTE [DISTWIDTH] IN BLOOD BY AUTOMATED COUNT: 17.7 % (ref 10–15)
ERYTHROCYTE [DISTWIDTH] IN BLOOD BY AUTOMATED COUNT: 17.7 % (ref 11.5–15.5)
ERYTHROCYTE [DISTWIDTH] IN BLOOD BY AUTOMATED COUNT: 17.9 % (ref 10–15)
ERYTHROCYTE [DISTWIDTH] IN BLOOD BY AUTOMATED COUNT: 18 % (ref 10–15)
ERYTHROCYTE [DISTWIDTH] IN BLOOD BY AUTOMATED COUNT: 18.1 % (ref 10–15)
ERYTHROCYTE [DISTWIDTH] IN BLOOD BY AUTOMATED COUNT: 18.2 % (ref 10–15)
ERYTHROCYTE [DISTWIDTH] IN BLOOD BY AUTOMATED COUNT: 18.3 % (ref 10–15)
ERYTHROCYTE [DISTWIDTH] IN BLOOD BY AUTOMATED COUNT: 18.4 %
ERYTHROCYTE [DISTWIDTH] IN BLOOD BY AUTOMATED COUNT: 18.5 % (ref 10–15)
ERYTHROCYTE [DISTWIDTH] IN BLOOD BY AUTOMATED COUNT: 18.6 % (ref 10–15)
ERYTHROCYTE [DISTWIDTH] IN BLOOD BY AUTOMATED COUNT: 19.2 % (ref 10–15)
ERYTHROCYTE [DISTWIDTH] IN BLOOD BY AUTOMATED COUNT: 19.2 % (ref 10–15)
ERYTHROCYTE [DISTWIDTH] IN BLOOD BY AUTOMATED COUNT: 19.4 % (ref 10–15)
ERYTHROCYTE [DISTWIDTH] IN BLOOD BY AUTOMATED COUNT: 19.5 % (ref 10–15)
ERYTHROCYTE [DISTWIDTH] IN BLOOD BY AUTOMATED COUNT: 19.6 % (ref 10–15)
ERYTHROCYTE [DISTWIDTH] IN BLOOD BY AUTOMATED COUNT: 19.7 % (ref 10–15)
ERYTHROCYTE [DISTWIDTH] IN BLOOD BY AUTOMATED COUNT: 19.7 % (ref 10–15)
ERYTHROCYTE [SEDIMENTATION RATE] IN BLOOD: 82 MM/HR
FERRITIN SERPL-MCNC: 104 NG/ML (ref 26–388)
FERRITIN SERPL-MCNC: 120 NG/ML (ref 26–388)
FERRITIN SERPL-MCNC: 457 NG/ML (ref 26–388)
FLEXIBLE SIGMOIDOSCOPY: NORMAL
FLEXIBLE SIGMOIDOSCOPY: NORMAL
FLUAV+FLUBV RNA SPEC QL NAA+PROBE: NEGATIVE
FLUAV+FLUBV RNA SPEC QL NAA+PROBE: NEGATIVE
GFR SERPL CREATININE-BSD FRML MDRD: 25 ML/MIN/1.7M2
GFR SERPL CREATININE-BSD FRML MDRD: 32 ML/MIN/1.7M2
GFR SERPL CREATININE-BSD FRML MDRD: 34 ML/MIN/1.7M2
GFR SERPL CREATININE-BSD FRML MDRD: 45 ML/MIN/1.7M2
GFR SERPL CREATININE-BSD FRML MDRD: 46 ML/MIN/1.7M2
GFR SERPL CREATININE-BSD FRML MDRD: 53 ML/MIN/1.73M2
GFR SERPL CREATININE-BSD FRML MDRD: 57 ML/MIN/1.7M2
GFR SERPL CREATININE-BSD FRML MDRD: 58 ML/MIN/1.7M2
GFR SERPL CREATININE-BSD FRML MDRD: 64 ML/MIN/1.7M2
GFR SERPL CREATININE-BSD FRML MDRD: 70 ML/MIN/1.7M2
GFR SERPL CREATININE-BSD FRML MDRD: 70 ML/MIN/1.7M2
GFR SERPL CREATININE-BSD FRML MDRD: 71 ML/MIN/1.7M2
GFR SERPL CREATININE-BSD FRML MDRD: 73 ML/MIN/1.7M2
GFR SERPL CREATININE-BSD FRML MDRD: 73 ML/MIN/1.7M2
GFR SERPL CREATININE-BSD FRML MDRD: 75 ML/MIN/1.7M2
GFR SERPL CREATININE-BSD FRML MDRD: 75 ML/MIN/1.7M2
GFR SERPL CREATININE-BSD FRML MDRD: 77 ML/MIN/1.7M2
GFR SERPL CREATININE-BSD FRML MDRD: 77 ML/MIN/1.7M2
GFR SERPL CREATININE-BSD FRML MDRD: 81 ML/MIN/1.7M2
GFR SERPL CREATININE-BSD FRML MDRD: 83 ML/MIN/1.7M2
GFR SERPL CREATININE-BSD FRML MDRD: 84 ML/MIN/1.7M2
GFR SERPL CREATININE-BSD FRML MDRD: 84 ML/MIN/1.7M2
GFR SERPL CREATININE-BSD FRML MDRD: 87 ML/MIN/1.7M2
GFR SERPL CREATININE-BSD FRML MDRD: 88 ML/MIN/1.7M2
GFR SERPL CREATININE-BSD FRML MDRD: 88 ML/MIN/1.7M2
GFR SERPL CREATININE-BSD FRML MDRD: >60 ML/MIN/1.73M2
GFR SERPL CREATININE-BSD FRML MDRD: >90 ML/MIN/1.7M2
GLOBULIN: 4.2 G/DL
GLOBULIN: 4.2 G/DL
GLOBULIN: 4.3 G/DL
GLOBULIN: 5 G/DL
GLOBULIN: 5 G/DL
GLOBULIN: 5.5 G/DL (ref 2–3.7)
GLUCOSE BLDC GLUCOMTR-MCNC: 100 MG/DL (ref 70–99)
GLUCOSE BLDC GLUCOMTR-MCNC: 113 MG/DL (ref 70–99)
GLUCOSE BLDC GLUCOMTR-MCNC: 117 MG/DL (ref 70–99)
GLUCOSE BLDC GLUCOMTR-MCNC: 126 MG/DL (ref 70–99)
GLUCOSE BLDC GLUCOMTR-MCNC: 130 MG/DL (ref 70–99)
GLUCOSE BLDC GLUCOMTR-MCNC: 145 MG/DL (ref 70–99)
GLUCOSE BLDC GLUCOMTR-MCNC: 165 MG/DL (ref 70–99)
GLUCOSE BLDC GLUCOMTR-MCNC: 80 MG/DL (ref 70–99)
GLUCOSE BLDC GLUCOMTR-MCNC: 81 MG/DL (ref 70–99)
GLUCOSE BLDC GLUCOMTR-MCNC: 83 MG/DL (ref 70–99)
GLUCOSE BLDC GLUCOMTR-MCNC: 84 MG/DL (ref 70–99)
GLUCOSE BLDC GLUCOMTR-MCNC: 87 MG/DL (ref 70–99)
GLUCOSE BLDC GLUCOMTR-MCNC: 89 MG/DL (ref 70–99)
GLUCOSE SERPL-MCNC: 100 MG/DL (ref 70–99)
GLUCOSE SERPL-MCNC: 101 MG/DL (ref 70–99)
GLUCOSE SERPL-MCNC: 101 MG/DL (ref 70–99)
GLUCOSE SERPL-MCNC: 102 MG/DL (ref 70–99)
GLUCOSE SERPL-MCNC: 103 MG/DL (ref 70–99)
GLUCOSE SERPL-MCNC: 105 MG/DL (ref 70–99)
GLUCOSE SERPL-MCNC: 106 MG/DL (ref 70–99)
GLUCOSE SERPL-MCNC: 107 MG/DL (ref 65–100)
GLUCOSE SERPL-MCNC: 107 MG/DL (ref 70–99)
GLUCOSE SERPL-MCNC: 107 MG/DL (ref 70–99)
GLUCOSE SERPL-MCNC: 109 MG/DL (ref 70–99)
GLUCOSE SERPL-MCNC: 110 MG/DL (ref 70–99)
GLUCOSE SERPL-MCNC: 111 MG/DL (ref 70–99)
GLUCOSE SERPL-MCNC: 112 MG/DL (ref 70–99)
GLUCOSE SERPL-MCNC: 113 MG/DL (ref 70–99)
GLUCOSE SERPL-MCNC: 113 MG/DL (ref 70–99)
GLUCOSE SERPL-MCNC: 115 MG/DL (ref 70–99)
GLUCOSE SERPL-MCNC: 115 MG/DL (ref 70–99)
GLUCOSE SERPL-MCNC: 116 MG/DL (ref 70–99)
GLUCOSE SERPL-MCNC: 117 MG/DL (ref 70–99)
GLUCOSE SERPL-MCNC: 120 MG/DL (ref 70–99)
GLUCOSE SERPL-MCNC: 122 MG/DL (ref 70–99)
GLUCOSE SERPL-MCNC: 122 MG/DL (ref 70–99)
GLUCOSE SERPL-MCNC: 123 MG/DL (ref 70–99)
GLUCOSE SERPL-MCNC: 123 MG/DL (ref 70–99)
GLUCOSE SERPL-MCNC: 124 MG/DL (ref 70–99)
GLUCOSE SERPL-MCNC: 127 MG/DL (ref 70–99)
GLUCOSE SERPL-MCNC: 127 MG/DL (ref 70–99)
GLUCOSE SERPL-MCNC: 132 MG/DL (ref 70–99)
GLUCOSE SERPL-MCNC: 134 MG/DL (ref 70–99)
GLUCOSE SERPL-MCNC: 136 MG/DL (ref 70–99)
GLUCOSE SERPL-MCNC: 138 MG/DL (ref 70–99)
GLUCOSE SERPL-MCNC: 138 MG/DL (ref 70–99)
GLUCOSE SERPL-MCNC: 139 MG/DL (ref 70–99)
GLUCOSE SERPL-MCNC: 141 MG/DL (ref 70–99)
GLUCOSE SERPL-MCNC: 144 MG/DL (ref 70–99)
GLUCOSE SERPL-MCNC: 146 MG/DL (ref 70–99)
GLUCOSE SERPL-MCNC: 146 MG/DL (ref 70–99)
GLUCOSE SERPL-MCNC: 148 MG/DL (ref 70–99)
GLUCOSE SERPL-MCNC: 152 MG/DL (ref 70–99)
GLUCOSE SERPL-MCNC: 153 MG/DL (ref 70–99)
GLUCOSE SERPL-MCNC: 160 MG/DL (ref 70–99)
GLUCOSE SERPL-MCNC: 162 MG/DL (ref 70–99)
GLUCOSE SERPL-MCNC: 167 MG/DL (ref 70–99)
GLUCOSE SERPL-MCNC: 167 MG/DL (ref 70–99)
GLUCOSE SERPL-MCNC: 202 MG/DL (ref 70–99)
GLUCOSE SERPL-MCNC: 83 MG/DL (ref 70–99)
GLUCOSE SERPL-MCNC: 85 MG/DL (ref 70–99)
GLUCOSE SERPL-MCNC: 86 MG/DL (ref 70–99)
GLUCOSE SERPL-MCNC: 87 MG/DL (ref 70–99)
GLUCOSE SERPL-MCNC: 88 MG/DL (ref 70–99)
GLUCOSE SERPL-MCNC: 93 MG/DL (ref 70–99)
GLUCOSE SERPL-MCNC: 94 MG/DL (ref 70–99)
GLUCOSE SERPL-MCNC: 96 MG/DL (ref 70–99)
GLUCOSE SERPL-MCNC: 97 MG/DL (ref 70–99)
GLUCOSE SERPL-MCNC: 98 MG/DL (ref 70–99)
GLUCOSE SERPL-MCNC: 99 MG/DL (ref 70–99)
GLUCOSE SERPL-MCNC: 99 MG/DL (ref 70–99)
GLUCOSE UR STRIP-MCNC: ABNORMAL MG/DL
GLUCOSE UR STRIP-MCNC: NEGATIVE MG/DL
GRAM STN SPEC: ABNORMAL
GRAN CASTS #/AREA URNS LPF: 4 /LPF
HCT VFR BLD AUTO: 20.3 % (ref 40–53)
HCT VFR BLD AUTO: 22.9 % (ref 40–53)
HCT VFR BLD AUTO: 23.1 % (ref 40–53)
HCT VFR BLD AUTO: 23.2 % (ref 40–53)
HCT VFR BLD AUTO: 23.4 % (ref 40–53)
HCT VFR BLD AUTO: 23.6 % (ref 40–53)
HCT VFR BLD AUTO: 23.6 % (ref 40–53)
HCT VFR BLD AUTO: 24.4 % (ref 40–53)
HCT VFR BLD AUTO: 24.8 % (ref 40–53)
HCT VFR BLD AUTO: 25 %
HCT VFR BLD AUTO: 25 % (ref 40–53)
HCT VFR BLD AUTO: 25.4 % (ref 40–53)
HCT VFR BLD AUTO: 25.5 % (ref 40–53)
HCT VFR BLD AUTO: 25.8 % (ref 40–53)
HCT VFR BLD AUTO: 26 % (ref 40–53)
HCT VFR BLD AUTO: 26.3 % (ref 40–53)
HCT VFR BLD AUTO: 26.6 % (ref 40–53)
HCT VFR BLD AUTO: 26.7 % (ref 40–53)
HCT VFR BLD AUTO: 26.9 % (ref 40–53)
HCT VFR BLD AUTO: 27.1 % (ref 40–53)
HCT VFR BLD AUTO: 27.1 % (ref 40–53)
HCT VFR BLD AUTO: 27.2 % (ref 40–53)
HCT VFR BLD AUTO: 27.2 % (ref 40–53)
HCT VFR BLD AUTO: 27.3 % (ref 40–53)
HCT VFR BLD AUTO: 27.5 %
HCT VFR BLD AUTO: 27.5 % (ref 40–53)
HCT VFR BLD AUTO: 27.5 % (ref 40–53)
HCT VFR BLD AUTO: 27.6 % (ref 40–53)
HCT VFR BLD AUTO: 27.6 % (ref 40–53)
HCT VFR BLD AUTO: 27.7 % (ref 40–53)
HCT VFR BLD AUTO: 27.7 % (ref 40–53)
HCT VFR BLD AUTO: 27.8 % (ref 40–53)
HCT VFR BLD AUTO: 27.9 % (ref 40–53)
HCT VFR BLD AUTO: 27.9 % (ref 40–53)
HCT VFR BLD AUTO: 28 % (ref 40–53)
HCT VFR BLD AUTO: 28 % (ref 40–53)
HCT VFR BLD AUTO: 28.3 % (ref 40–53)
HCT VFR BLD AUTO: 28.5 % (ref 40–53)
HCT VFR BLD AUTO: 28.5 % (ref 40–53)
HCT VFR BLD AUTO: 28.6 % (ref 40–53)
HCT VFR BLD AUTO: 29 % (ref 40–53)
HCT VFR BLD AUTO: 29.3 % (ref 40–53)
HCT VFR BLD AUTO: 29.4 %
HCT VFR BLD AUTO: 29.4 % (ref 40–53)
HCT VFR BLD AUTO: 29.5 % (ref 40–53)
HCT VFR BLD AUTO: 29.5 % (ref 40–53)
HCT VFR BLD AUTO: 30.2 % (ref 40–53)
HCT VFR BLD AUTO: 30.3 % (ref 40–53)
HCT VFR BLD AUTO: 32 % (ref 40–53)
HCT VFR BLD AUTO: 32 % (ref 40–53)
HCT VFR BLD AUTO: 32.1 % (ref 40–53)
HCT VFR BLD AUTO: 32.4 % (ref 40–53)
HCT VFR BLD AUTO: 32.6 %
HCT VFR BLD AUTO: 32.9 % (ref 40–53)
HCT VFR BLD AUTO: 33 %
HCT VFR BLD AUTO: 33 % (ref 40–53)
HCT VFR BLD AUTO: 33.7 % (ref 40–53)
HCT VFR BLD AUTO: 34.4 % (ref 40–53)
HCT VFR BLD AUTO: 34.8 % (ref 40–53)
HCT VFR BLD AUTO: 35.5 %
HCT VFR BLD AUTO: 36.2 % (ref 40–53)
HCT VFR BLD AUTO: 37.9 % (ref 40–53)
HCT VFR BLD AUTO: 38.5 % (ref 40–53)
HDLC SERPL-MCNC: NORMAL MG/DL
HEMOGLOBIN: 11 G/DL
HEMOGLOBIN: 11.3 G/DL
HEMOGLOBIN: 11.8 G/DL
HEMOGLOBIN: 8 G/DL
HEMOGLOBIN: 8.8 G/DL (ref 13.3–17.7)
HEMOGLOBIN: 9.9 G/DL (ref 13.3–17.7)
HGB BLD-MCNC: 10.1 G/DL (ref 13.3–17.7)
HGB BLD-MCNC: 10.2 G/DL (ref 13.3–17.7)
HGB BLD-MCNC: 10.5 G/DL (ref 13.3–17.7)
HGB BLD-MCNC: 10.7 G/DL (ref 13.3–17.7)
HGB BLD-MCNC: 10.7 G/DL (ref 13.3–17.7)
HGB BLD-MCNC: 10.9 G/DL (ref 13.3–17.7)
HGB BLD-MCNC: 10.9 G/DL (ref 13.3–17.7)
HGB BLD-MCNC: 11.1 G/DL (ref 13.3–17.7)
HGB BLD-MCNC: 11.4 G/DL (ref 13.3–17.7)
HGB BLD-MCNC: 11.6 G/DL (ref 13.3–17.7)
HGB BLD-MCNC: 11.7 G/DL (ref 13.3–17.7)
HGB BLD-MCNC: 12.7 G/DL (ref 13.3–17.7)
HGB BLD-MCNC: 12.9 G/DL (ref 13.3–17.7)
HGB BLD-MCNC: 6.4 G/DL (ref 13.3–17.7)
HGB BLD-MCNC: 7.2 G/DL (ref 13.3–17.7)
HGB BLD-MCNC: 7.3 G/DL (ref 13.3–17.7)
HGB BLD-MCNC: 7.4 G/DL (ref 13.3–17.7)
HGB BLD-MCNC: 7.5 G/DL (ref 13.3–17.7)
HGB BLD-MCNC: 7.8 G/DL (ref 13.3–17.7)
HGB BLD-MCNC: 7.9 G/DL (ref 13.3–17.7)
HGB BLD-MCNC: 8 G/DL (ref 13.3–17.7)
HGB BLD-MCNC: 8.1 G/DL (ref 13.3–17.7)
HGB BLD-MCNC: 8.2 G/DL (ref 13.3–17.7)
HGB BLD-MCNC: 8.3 G/DL (ref 13.3–17.7)
HGB BLD-MCNC: 8.4 G/DL (ref 13.3–17.7)
HGB BLD-MCNC: 8.5 G/DL (ref 13.3–17.7)
HGB BLD-MCNC: 8.7 G/DL (ref 13.3–17.7)
HGB BLD-MCNC: 8.8 G/DL (ref 13.3–17.7)
HGB BLD-MCNC: 8.9 G/DL (ref 13.3–17.7)
HGB BLD-MCNC: 8.9 G/DL (ref 13.3–17.7)
HGB BLD-MCNC: 9 G/DL (ref 13.3–17.7)
HGB BLD-MCNC: 9.1 G/DL (ref 13.3–17.7)
HGB BLD-MCNC: 9.1 G/DL (ref 13.3–17.7)
HGB BLD-MCNC: 9.2 G/DL (ref 13.3–17.7)
HGB BLD-MCNC: 9.4 G/DL (ref 13.3–17.7)
HGB BLD-MCNC: 9.5 G/DL (ref 13.3–17.7)
HGB BLD-MCNC: 9.6 G/DL (ref 13.3–17.7)
HGB BLD-MCNC: 9.9 G/DL (ref 13.3–17.7)
HGB UR QL STRIP: ABNORMAL
HGB UR QL STRIP: NEGATIVE
HYALINE CASTS #/AREA URNS LPF: 31 /LPF (ref 0–2)
IMM GRANULOCYTES # BLD: 0 10E9/L (ref 0–0.4)
IMM GRANULOCYTES # BLD: 0.1 10E9/L (ref 0–0.4)
IMM GRANULOCYTES # BLD: 0.2 10E9/L (ref 0–0.4)
IMM GRANULOCYTES # BLD: 0.3 10E9/L (ref 0–0.4)
IMM GRANULOCYTES # BLD: 0.3 10E9/L (ref 0–0.4)
IMM GRANULOCYTES # BLD: 0.5 10E9/L (ref 0–0.4)
IMM GRANULOCYTES # BLD: 0.8 10E9/L (ref 0–0.4)
IMM GRANULOCYTES # BLD: 1.3 10E9/L (ref 0–0.4)
IMM GRANULOCYTES # BLD: 1.8 10E9/L (ref 0–0.4)
IMM GRANULOCYTES NFR BLD: 0 %
IMM GRANULOCYTES NFR BLD: 0.1 %
IMM GRANULOCYTES NFR BLD: 0.2 %
IMM GRANULOCYTES NFR BLD: 0.3 %
IMM GRANULOCYTES NFR BLD: 0.4 %
IMM GRANULOCYTES NFR BLD: 0.5 %
IMM GRANULOCYTES NFR BLD: 0.5 %
IMM GRANULOCYTES NFR BLD: 0.7 %
IMM GRANULOCYTES NFR BLD: 0.7 %
IMM GRANULOCYTES NFR BLD: 0.8 %
IMM GRANULOCYTES NFR BLD: 1.1 %
IMM GRANULOCYTES NFR BLD: 1.3 %
IMM GRANULOCYTES NFR BLD: 1.6 %
IMM GRANULOCYTES NFR BLD: 1.7 %
IMM GRANULOCYTES NFR BLD: 2.2 %
IMM GRANULOCYTES NFR BLD: 2.4 %
IMM GRANULOCYTES NFR BLD: 3.2 %
IMM GRANULOCYTES NFR BLD: 3.5 %
IMM GRANULOCYTES NFR BLD: 3.8 %
IMM GRANULOCYTES NFR BLD: 3.8 %
IMM GRANULOCYTES NFR BLD: 4.4 %
IMM GRANULOCYTES NFR BLD: 5 %
INR PPP: 1.04 (ref 0.86–1.14)
INR PPP: 1.06 (ref 0.86–1.14)
INR PPP: 1.07 (ref 0.86–1.14)
INR PPP: 1.1 (ref 0.86–1.14)
INR PPP: 1.12 (ref 0.86–1.14)
INR PPP: 1.25 (ref 0.86–1.14)
INR PPP: 1.27 (ref 0.86–1.14)
INR PPP: 1.49 (ref 0.86–1.14)
INR PPP: 2.14 (ref 0.86–1.14)
INR PPP: 2.31 (ref 0.86–1.14)
INTERPRETATION ECG - MUSE: NORMAL
IRON SATN MFR SERPL: 12 % (ref 15–46)
IRON SATN MFR SERPL: 13 % (ref 15–46)
IRON SATN MFR SERPL: 13 % (ref 15–46)
IRON SERPL-MCNC: 18 UG/DL (ref 35–180)
IRON SERPL-MCNC: 29 UG/DL (ref 35–180)
IRON SERPL-MCNC: 45 UG/DL (ref 35–180)
KETONES UR STRIP-MCNC: ABNORMAL MG/DL
KETONES UR STRIP-MCNC: NEGATIVE MG/DL
LACTATE BLD-SCNC: 0.5 MMOL/L (ref 0.7–2)
LACTATE BLD-SCNC: 0.5 MMOL/L (ref 0.7–2)
LACTATE BLD-SCNC: 0.6 MMOL/L (ref 0.4–1.9)
LACTATE BLD-SCNC: 0.6 MMOL/L (ref 0.7–2)
LACTATE BLD-SCNC: 0.6 MMOL/L (ref 0.7–2)
LACTATE BLD-SCNC: 0.8 MMOL/L (ref 0.7–2)
LACTATE BLD-SCNC: 0.9 MMOL/L (ref 0.4–1.9)
LACTATE BLD-SCNC: 0.9 MMOL/L (ref 0.7–2)
LACTATE BLD-SCNC: 0.9 MMOL/L (ref 0.7–2.1)
LACTATE BLD-SCNC: 1 MMOL/L (ref 0.4–1.9)
LACTATE BLD-SCNC: 1 MMOL/L (ref 0.4–1.9)
LACTATE BLD-SCNC: 1 MMOL/L (ref 0.7–2)
LACTATE BLD-SCNC: 1.1 MMOL/L (ref 0.4–1.9)
LACTATE BLD-SCNC: 1.1 MMOL/L (ref 0.7–2)
LACTATE BLD-SCNC: 1.1 MMOL/L (ref 0.7–2)
LACTATE BLD-SCNC: 1.3 MMOL/L (ref 0.7–2)
LACTATE BLD-SCNC: 1.4 MMOL/L (ref 0.7–2)
LACTATE BLD-SCNC: 1.7 MMOL/L (ref 0.7–2)
LACTATE BLD-SCNC: 1.8 MMOL/L (ref 0.4–1.9)
LACTATE BLD-SCNC: 2.1 MMOL/L (ref 0.7–2)
LACTATE SERPL-SCNC: 1.5 MMOL/L (ref 0.4–2)
LACTATE SERPL-SCNC: 1.5 MMOL/L (ref 0.4–2)
LDH SERPL L TO P-CCNC: 116 U/L (ref 85–227)
LDLC SERPL CALC-MCNC: NORMAL MG/DL
LEUKOCYTE ESTERASE UR QL STRIP: ABNORMAL
LEUKOCYTE ESTERASE UR QL STRIP: NEGATIVE
LIPASE SERPL-CCNC: 112 U/L (ref 73–393)
LIPASE SERPL-CCNC: 153 U/L (ref 73–393)
LIPASE SERPL-CCNC: 63 U/L (ref 73–393)
LIPASE SERPL-CCNC: 91 U/L (ref 73–393)
LYMPHOCYTES # BLD AUTO: 0.8 10E9/L (ref 0.8–5.3)
LYMPHOCYTES # BLD AUTO: 0.9 10E9/L (ref 0.8–5.3)
LYMPHOCYTES # BLD AUTO: 1 10E9/L (ref 0.8–5.3)
LYMPHOCYTES # BLD AUTO: 1.1 10E9/L (ref 0.8–5.3)
LYMPHOCYTES # BLD AUTO: 1.2 10E9/L (ref 0.8–5.3)
LYMPHOCYTES # BLD AUTO: 1.3 10*3/UL
LYMPHOCYTES # BLD AUTO: 1.3 10E9/L (ref 0.8–5.3)
LYMPHOCYTES # BLD AUTO: 1.3 10E9/L (ref 0.8–5.3)
LYMPHOCYTES # BLD AUTO: 1.4 10E9/L (ref 0.8–5.3)
LYMPHOCYTES # BLD AUTO: 1.5 10E9/L (ref 0.8–5.3)
LYMPHOCYTES # BLD AUTO: 1.5 10E9/L (ref 0.8–5.3)
LYMPHOCYTES # BLD AUTO: 1.6 10E9/L (ref 0.8–5.3)
LYMPHOCYTES # BLD AUTO: 1.7 10*3/UL
LYMPHOCYTES # BLD AUTO: 1.7 10E9/L (ref 0.8–5.3)
LYMPHOCYTES # BLD AUTO: 1.7 10E9/L (ref 0.8–5.3)
LYMPHOCYTES # BLD AUTO: 1.8 10E9/L (ref 0.8–5.3)
LYMPHOCYTES # BLD AUTO: 1.9 10E9/L (ref 0.8–5.3)
LYMPHOCYTES # BLD AUTO: 1.9 10E9/L (ref 0.8–5.3)
LYMPHOCYTES # BLD AUTO: 2 10*3/UL
LYMPHOCYTES # BLD AUTO: 2 10E9/L (ref 0.8–5.3)
LYMPHOCYTES # BLD AUTO: 2.1 10*3/UL
LYMPHOCYTES # BLD AUTO: 2.1 10E9/L (ref 0.8–5.3)
LYMPHOCYTES # BLD AUTO: 2.1 10E9/L (ref 0.8–5.3)
LYMPHOCYTES # BLD AUTO: 2.2 10*3/UL
LYMPHOCYTES # BLD AUTO: 2.2 10E9/L (ref 0.8–5.3)
LYMPHOCYTES # BLD AUTO: 2.2 10E9/L (ref 0.8–5.3)
LYMPHOCYTES # BLD AUTO: 2.3 10E9/L (ref 0.8–5.3)
LYMPHOCYTES # BLD AUTO: 2.3 10E9/L (ref 0.8–5.3)
LYMPHOCYTES # BLD AUTO: 2.4 10E9/L (ref 0.8–5.3)
LYMPHOCYTES # BLD AUTO: 2.4 10E9/L (ref 0.8–5.3)
LYMPHOCYTES # BLD AUTO: 2.5 10E9/L (ref 0.8–5.3)
LYMPHOCYTES # BLD AUTO: 2.6 10E9/L (ref 0.8–5.3)
LYMPHOCYTES # BLD AUTO: 2.6 10E9/L (ref 0.8–5.3)
LYMPHOCYTES # BLD AUTO: 3.2 10E9/L (ref 0.8–5.3)
LYMPHOCYTES # BLD AUTO: 3.4 10E9/L (ref 0.8–5.3)
LYMPHOCYTES # BLD AUTO: 3.5 10E9/L (ref 0.8–5.3)
LYMPHOCYTES # BLD AUTO: 3.7 10E9/L (ref 0.8–5.3)
LYMPHOCYTES NFR BLD AUTO: 10.5 %
LYMPHOCYTES NFR BLD AUTO: 10.6 %
LYMPHOCYTES NFR BLD AUTO: 11.5 %
LYMPHOCYTES NFR BLD AUTO: 12 %
LYMPHOCYTES NFR BLD AUTO: 12.1 %
LYMPHOCYTES NFR BLD AUTO: 12.4 %
LYMPHOCYTES NFR BLD AUTO: 12.5 %
LYMPHOCYTES NFR BLD AUTO: 13 %
LYMPHOCYTES NFR BLD AUTO: 13.3 %
LYMPHOCYTES NFR BLD AUTO: 13.4 %
LYMPHOCYTES NFR BLD AUTO: 13.8 %
LYMPHOCYTES NFR BLD AUTO: 13.9 %
LYMPHOCYTES NFR BLD AUTO: 14.3 %
LYMPHOCYTES NFR BLD AUTO: 14.3 %
LYMPHOCYTES NFR BLD AUTO: 14.4 %
LYMPHOCYTES NFR BLD AUTO: 15.4 %
LYMPHOCYTES NFR BLD AUTO: 15.8 %
LYMPHOCYTES NFR BLD AUTO: 16.2 %
LYMPHOCYTES NFR BLD AUTO: 16.5 %
LYMPHOCYTES NFR BLD AUTO: 16.9 %
LYMPHOCYTES NFR BLD AUTO: 17.1 %
LYMPHOCYTES NFR BLD AUTO: 17.1 %
LYMPHOCYTES NFR BLD AUTO: 17.3 %
LYMPHOCYTES NFR BLD AUTO: 17.7 %
LYMPHOCYTES NFR BLD AUTO: 18 %
LYMPHOCYTES NFR BLD AUTO: 18.3 %
LYMPHOCYTES NFR BLD AUTO: 18.4 %
LYMPHOCYTES NFR BLD AUTO: 18.5 %
LYMPHOCYTES NFR BLD AUTO: 18.9 %
LYMPHOCYTES NFR BLD AUTO: 19.6 %
LYMPHOCYTES NFR BLD AUTO: 19.8 %
LYMPHOCYTES NFR BLD AUTO: 20.2 %
LYMPHOCYTES NFR BLD AUTO: 20.7 %
LYMPHOCYTES NFR BLD AUTO: 20.7 %
LYMPHOCYTES NFR BLD AUTO: 20.8 %
LYMPHOCYTES NFR BLD AUTO: 21.3 %
LYMPHOCYTES NFR BLD AUTO: 21.8 %
LYMPHOCYTES NFR BLD AUTO: 22 %
LYMPHOCYTES NFR BLD AUTO: 23.5 %
LYMPHOCYTES NFR BLD AUTO: 24.2 %
LYMPHOCYTES NFR BLD AUTO: 24.7 %
LYMPHOCYTES NFR BLD AUTO: 24.9 %
LYMPHOCYTES NFR BLD AUTO: 25.8 %
LYMPHOCYTES NFR BLD AUTO: 26.7 %
LYMPHOCYTES NFR BLD AUTO: 28.1 %
LYMPHOCYTES NFR BLD AUTO: 3.4 %
LYMPHOCYTES NFR BLD AUTO: 37 %
LYMPHOCYTES NFR BLD AUTO: 4.4 %
LYMPHOCYTES NFR BLD AUTO: 4.8 %
LYMPHOCYTES NFR BLD AUTO: 5.2 %
LYMPHOCYTES NFR BLD AUTO: 6.3 %
LYMPHOCYTES NFR BLD AUTO: 7.9 %
LYMPHOCYTES NFR BLD AUTO: 8.5 %
LYMPHOCYTES NFR BLD AUTO: 8.6 %
LYMPHOCYTES NFR BLD AUTO: 8.8 %
LYMPHOCYTES NFR BLD AUTO: 8.8 %
LYMPHOCYTES NFR BLD AUTO: 9.1 %
LYMPHOCYTES NFR BLD AUTO: 9.1 %
Lab: ABNORMAL
Lab: NORMAL
MACROCYTES BLD QL SMEAR: PRESENT
MACROCYTES BLD QL SMEAR: PRESENT
MAGNESIUM SERPL-MCNC: 1.6 MG/DL (ref 1.6–2.3)
MAGNESIUM SERPL-MCNC: 1.7 MG/DL
MAGNESIUM SERPL-MCNC: 1.7 MG/DL (ref 1.6–2.3)
MAGNESIUM SERPL-MCNC: 1.8 MG/DL (ref 1.6–2.3)
MAGNESIUM SERPL-MCNC: 1.9 MG/DL
MAGNESIUM SERPL-MCNC: 1.9 MG/DL (ref 1.6–2.3)
MAGNESIUM SERPL-MCNC: 2 MG/DL (ref 1.6–2.3)
MAGNESIUM SERPL-MCNC: 2.1 MG/DL
MAGNESIUM SERPL-MCNC: 2.1 MG/DL (ref 1.6–2.3)
MAGNESIUM SERPL-MCNC: 2.2 MG/DL
MAGNESIUM SERPL-MCNC: 2.2 MG/DL (ref 1.6–2.3)
MAGNESIUM SERPL-MCNC: 2.2 MG/DL (ref 1.6–2.3)
MAGNESIUM SERPL-MCNC: 2.3 MG/DL
MAGNESIUM SERPL-MCNC: 2.3 MG/DL (ref 1.6–2.3)
MAGNESIUM SERPL-MCNC: 2.4 MG/DL
MAGNESIUM SERPL-MCNC: 2.4 MG/DL (ref 1.6–2.3)
MAGNESIUM SERPL-MCNC: 2.4 MG/DL (ref 1.6–2.3)
MAGNESIUM SERPL-MCNC: 2.7 MG/DL (ref 1.6–2.3)
MAGNESIUM SERPL-MCNC: 2.7 MG/DL (ref 1.6–2.3)
MAGNESIUM SERPL-MCNC: 2.9 MG/DL (ref 1.6–2.3)
MAGNESIUM SERPL-MCNC: 3 MG/DL (ref 1.6–2.3)
MAGNESIUM SERPL-MCNC: 3 MG/DL (ref 1.6–2.3)
MCH RBC QN AUTO: 24.9 PG (ref 26.5–33)
MCH RBC QN AUTO: 25.1 PG (ref 26.5–33)
MCH RBC QN AUTO: 25.1 PG (ref 26.5–33)
MCH RBC QN AUTO: 25.2 PG (ref 26.5–33)
MCH RBC QN AUTO: 25.3 PG (ref 26.5–33)
MCH RBC QN AUTO: 25.3 PG (ref 26.5–33)
MCH RBC QN AUTO: 25.4 PG (ref 26.5–33)
MCH RBC QN AUTO: 25.6 PG (ref 26.5–33)
MCH RBC QN AUTO: 25.7 PG (ref 26–34)
MCH RBC QN AUTO: 26 PG (ref 26.5–33)
MCH RBC QN AUTO: 26.2 PG (ref 26.5–33)
MCH RBC QN AUTO: 26.4 PG (ref 26.5–33)
MCH RBC QN AUTO: 26.6 PG (ref 26.5–33)
MCH RBC QN AUTO: 26.7 PG (ref 26.5–33)
MCH RBC QN AUTO: 26.8 PG (ref 26.5–33)
MCH RBC QN AUTO: 26.9 PG (ref 26.5–33)
MCH RBC QN AUTO: 27 PG (ref 26.5–33)
MCH RBC QN AUTO: 27 PG (ref 26.5–33)
MCH RBC QN AUTO: 27.1 PG (ref 26.5–33)
MCH RBC QN AUTO: 27.1 PG (ref 26.5–33)
MCH RBC QN AUTO: 27.3 PG (ref 26.5–33)
MCH RBC QN AUTO: 27.4 PG (ref 26.5–33)
MCH RBC QN AUTO: 27.4 PG (ref 26.5–33)
MCH RBC QN AUTO: 27.5 PG (ref 26.5–33)
MCH RBC QN AUTO: 27.6 PG (ref 26.5–33)
MCH RBC QN AUTO: 27.7 PG (ref 26.5–33)
MCH RBC QN AUTO: 27.8 PG
MCH RBC QN AUTO: 27.8 PG
MCH RBC QN AUTO: 27.8 PG (ref 26.5–33)
MCH RBC QN AUTO: 27.9 PG (ref 26.5–33)
MCH RBC QN AUTO: 28 PG (ref 26.5–33)
MCH RBC QN AUTO: 28.1 PG
MCH RBC QN AUTO: 28.1 PG (ref 26.5–33)
MCH RBC QN AUTO: 28.2 PG
MCH RBC QN AUTO: 28.3 PG (ref 26.5–33)
MCH RBC QN AUTO: 28.4 PG
MCH RBC QN AUTO: 28.5 PG (ref 26.5–33)
MCH RBC QN AUTO: 28.7 PG (ref 26.5–33)
MCH RBC QN AUTO: 28.9 PG (ref 26.5–33)
MCH RBC QN AUTO: 29 PG (ref 26.5–33)
MCH RBC QN AUTO: 29.1 PG (ref 26.5–33)
MCH RBC QN AUTO: 29.7 PG (ref 26.5–33)
MCHC RBC AUTO-ENTMCNC: 30 G/DL (ref 31.5–36.5)
MCHC RBC AUTO-ENTMCNC: 30 G/DL (ref 31.5–36.5)
MCHC RBC AUTO-ENTMCNC: 30.1 G/DL (ref 31.5–36.5)
MCHC RBC AUTO-ENTMCNC: 30.1 G/DL (ref 31.5–36.5)
MCHC RBC AUTO-ENTMCNC: 30.2 G/DL (ref 31.5–36.5)
MCHC RBC AUTO-ENTMCNC: 30.2 G/DL (ref 31.5–36.5)
MCHC RBC AUTO-ENTMCNC: 30.6 G/DL (ref 31.5–36.5)
MCHC RBC AUTO-ENTMCNC: 30.7 G/DL (ref 31.5–36.5)
MCHC RBC AUTO-ENTMCNC: 30.8 G/DL (ref 31.5–36.5)
MCHC RBC AUTO-ENTMCNC: 30.9 G/DL (ref 31.5–36.5)
MCHC RBC AUTO-ENTMCNC: 30.9 G/DL (ref 31.5–36.5)
MCHC RBC AUTO-ENTMCNC: 31 G/DL (ref 31.5–36.5)
MCHC RBC AUTO-ENTMCNC: 31.1 G/DL (ref 31.5–36.5)
MCHC RBC AUTO-ENTMCNC: 31.2 G/DL (ref 31.5–36.5)
MCHC RBC AUTO-ENTMCNC: 31.2 G/DL (ref 31.5–36.5)
MCHC RBC AUTO-ENTMCNC: 31.3 G/DL (ref 31.5–36.5)
MCHC RBC AUTO-ENTMCNC: 31.4 G/DL (ref 31.5–36.5)
MCHC RBC AUTO-ENTMCNC: 31.5 G/DL (ref 31.5–36.5)
MCHC RBC AUTO-ENTMCNC: 31.6 G/DL (ref 31.5–36.5)
MCHC RBC AUTO-ENTMCNC: 31.6 G/DL (ref 31.5–36.5)
MCHC RBC AUTO-ENTMCNC: 31.7 G/DL (ref 31.5–36.5)
MCHC RBC AUTO-ENTMCNC: 31.8 G/DL (ref 31.5–36.5)
MCHC RBC AUTO-ENTMCNC: 31.9 G/DL (ref 31.5–36.5)
MCHC RBC AUTO-ENTMCNC: 32 G/DL
MCHC RBC AUTO-ENTMCNC: 32 G/DL
MCHC RBC AUTO-ENTMCNC: 32 G/DL (ref 31.5–36.5)
MCHC RBC AUTO-ENTMCNC: 32.1 G/DL (ref 31.5–36.5)
MCHC RBC AUTO-ENTMCNC: 32.2 G/DL (ref 31.5–36.5)
MCHC RBC AUTO-ENTMCNC: 32.3 G/DL (ref 31.5–36.5)
MCHC RBC AUTO-ENTMCNC: 32.4 G/DL (ref 31.5–36.5)
MCHC RBC AUTO-ENTMCNC: 32.5 G/DL (ref 31.5–36.5)
MCHC RBC AUTO-ENTMCNC: 32.5 G/DL (ref 31.5–36.5)
MCHC RBC AUTO-ENTMCNC: 32.8 G/DL (ref 31.5–36.5)
MCHC RBC AUTO-ENTMCNC: 32.8 G/DL (ref 31.5–36.5)
MCHC RBC AUTO-ENTMCNC: 32.9 G/DL (ref 31.5–36.5)
MCHC RBC AUTO-ENTMCNC: 32.9 G/DL (ref 31.5–36.5)
MCHC RBC AUTO-ENTMCNC: 33 G/DL (ref 31.5–36.5)
MCHC RBC AUTO-ENTMCNC: 33.1 G/DL (ref 31.5–36.5)
MCHC RBC AUTO-ENTMCNC: 33.2 G/DL
MCHC RBC AUTO-ENTMCNC: 33.2 G/DL (ref 31.5–36.5)
MCHC RBC AUTO-ENTMCNC: 33.3 G/DL (ref 31.5–36.5)
MCHC RBC AUTO-ENTMCNC: 33.3 G/DL (ref 31.5–36.5)
MCHC RBC AUTO-ENTMCNC: 33.6 G/DL (ref 31.5–36.5)
MCHC RBC AUTO-ENTMCNC: 33.7 G/DL
MCHC RBC AUTO-ENTMCNC: 33.7 G/DL
MCHC RBC AUTO-ENTMCNC: 34 G/DL (ref 31.5–36.5)
MCHC RBC AUTO-ENTMCNC: 34.2 G/DL
MCHC RBC AUTO-ENTMCNC: 34.2 G/DL (ref 31.5–36.5)
MCHC RBC AUTO-ENTMCNC: 34.2 G/DL (ref 31.5–36.5)
MCHC RBC AUTO-ENTMCNC: 34.4 G/DL (ref 31.5–36.5)
MCHC RBC AUTO-ENTMCNC: 34.8 G/DL (ref 31.5–36.5)
MCHC RBC AUTO-ENTMCNC: 35.6 G/DL (ref 31.5–36.5)
MCV RBC AUTO: 8 FL (ref 37–53)
MCV RBC AUTO: 80 FL (ref 78–100)
MCV RBC AUTO: 81 FL (ref 78–100)
MCV RBC AUTO: 82 FL
MCV RBC AUTO: 82 FL (ref 78–100)
MCV RBC AUTO: 83 FL (ref 78–100)
MCV RBC AUTO: 84 FL
MCV RBC AUTO: 84 FL
MCV RBC AUTO: 84 FL (ref 78–100)
MCV RBC AUTO: 85 FL
MCV RBC AUTO: 85 FL (ref 78–100)
MCV RBC AUTO: 86 FL (ref 78–100)
MCV RBC AUTO: 86 FL (ref 78–100)
MCV RBC AUTO: 87 FL
MCV RBC AUTO: 87 FL (ref 78–100)
MCV RBC AUTO: 88 FL (ref 78–100)
MCV RBC AUTO: 89 FL (ref 78–100)
MCV RBC AUTO: 90 FL (ref 78–100)
MCV RBC AUTO: 90 FL (ref 78–100)
MCV RBC AUTO: 91 FL (ref 78–100)
METAMYELOCYTES # BLD: 0.2 10E9/L
METAMYELOCYTES # BLD: 0.5 10E9/L
METAMYELOCYTES # BLD: 0.8 10E9/L
METAMYELOCYTES NFR BLD MANUAL: 0.9 %
METAMYELOCYTES NFR BLD MANUAL: 0.9 %
METAMYELOCYTES NFR BLD MANUAL: 1.7 %
METAMYELOCYTES NFR BLD MANUAL: 1.7 %
METAMYELOCYTES NFR BLD MANUAL: 2.7 %
METAMYELOCYTES NFR BLD MANUAL: 3.5 %
METAMYELOCYTES NFR BLD MANUAL: 3.5 %
METAMYELOCYTES NFR BLD MANUAL: 7.1 %
MONOCYTES # BLD AUTO: 0.4 10E9/L (ref 0–1.3)
MONOCYTES # BLD AUTO: 0.4 10E9/L (ref 0–1.3)
MONOCYTES # BLD AUTO: 0.6 10E9/L (ref 0–1.3)
MONOCYTES # BLD AUTO: 0.6 10E9/L (ref 0–1.3)
MONOCYTES # BLD AUTO: 0.7 10E9/L (ref 0–1.3)
MONOCYTES # BLD AUTO: 0.8 10E9/L (ref 0–1.3)
MONOCYTES # BLD AUTO: 0.8 10E9/L (ref 0–1.3)
MONOCYTES # BLD AUTO: 0.9 10*3/UL
MONOCYTES # BLD AUTO: 0.9 10E9/L (ref 0–1.3)
MONOCYTES # BLD AUTO: 1 10*3/UL
MONOCYTES # BLD AUTO: 1 10E9/L (ref 0–1.3)
MONOCYTES # BLD AUTO: 1.1 10*3/UL
MONOCYTES # BLD AUTO: 1.1 10*3/UL
MONOCYTES # BLD AUTO: 1.1 10E9/L (ref 0–1.3)
MONOCYTES # BLD AUTO: 1.1 10E9/L (ref 0–1.3)
MONOCYTES # BLD AUTO: 1.2 10E9/L (ref 0–1.3)
MONOCYTES # BLD AUTO: 1.2 10E9/L (ref 0–1.3)
MONOCYTES # BLD AUTO: 1.3 10E9/L (ref 0–1.3)
MONOCYTES # BLD AUTO: 1.4 10*3/UL
MONOCYTES # BLD AUTO: 1.4 10E9/L (ref 0–1.3)
MONOCYTES # BLD AUTO: 1.5 10E9/L (ref 0–1.3)
MONOCYTES # BLD AUTO: 1.6 10E9/L (ref 0–1.3)
MONOCYTES # BLD AUTO: 1.7 10E9/L (ref 0–1.3)
MONOCYTES # BLD AUTO: 1.7 10E9/L (ref 0–1.3)
MONOCYTES # BLD AUTO: 1.8 10E9/L (ref 0–1.3)
MONOCYTES # BLD AUTO: 1.8 10E9/L (ref 0–1.3)
MONOCYTES # BLD AUTO: 2.3 10E9/L (ref 0–1.3)
MONOCYTES # BLD AUTO: 2.4 10E9/L (ref 0–1.3)
MONOCYTES # BLD AUTO: 2.4 10E9/L (ref 0–1.3)
MONOCYTES # BLD AUTO: 2.6 10E9/L (ref 0–1.3)
MONOCYTES # BLD AUTO: 2.7 10E9/L (ref 0–1.3)
MONOCYTES # BLD AUTO: 2.9 10E9/L (ref 0–1.3)
MONOCYTES # BLD AUTO: 3.2 10E9/L (ref 0–1.3)
MONOCYTES # BLD AUTO: 3.3 10E9/L (ref 0–1.3)
MONOCYTES # BLD AUTO: 3.9 10E9/L (ref 0–1.3)
MONOCYTES # BLD AUTO: 7.2 10E9/L (ref 0–1.3)
MONOCYTES NFR BLD AUTO: 1.8 %
MONOCYTES NFR BLD AUTO: 10 %
MONOCYTES NFR BLD AUTO: 10.5 %
MONOCYTES NFR BLD AUTO: 10.5 %
MONOCYTES NFR BLD AUTO: 10.6 %
MONOCYTES NFR BLD AUTO: 10.7 %
MONOCYTES NFR BLD AUTO: 11.5 %
MONOCYTES NFR BLD AUTO: 11.6 %
MONOCYTES NFR BLD AUTO: 11.7 %
MONOCYTES NFR BLD AUTO: 12.8 %
MONOCYTES NFR BLD AUTO: 13.1 %
MONOCYTES NFR BLD AUTO: 13.3 %
MONOCYTES NFR BLD AUTO: 13.4 %
MONOCYTES NFR BLD AUTO: 13.7 %
MONOCYTES NFR BLD AUTO: 13.9 %
MONOCYTES NFR BLD AUTO: 14.2 %
MONOCYTES NFR BLD AUTO: 14.2 %
MONOCYTES NFR BLD AUTO: 14.3 %
MONOCYTES NFR BLD AUTO: 14.6 %
MONOCYTES NFR BLD AUTO: 14.7 %
MONOCYTES NFR BLD AUTO: 14.9 %
MONOCYTES NFR BLD AUTO: 15.2 %
MONOCYTES NFR BLD AUTO: 15.9 %
MONOCYTES NFR BLD AUTO: 16.4 %
MONOCYTES NFR BLD AUTO: 16.6 %
MONOCYTES NFR BLD AUTO: 16.8 %
MONOCYTES NFR BLD AUTO: 16.9 %
MONOCYTES NFR BLD AUTO: 17 %
MONOCYTES NFR BLD AUTO: 17.1 %
MONOCYTES NFR BLD AUTO: 17.2 %
MONOCYTES NFR BLD AUTO: 17.9 %
MONOCYTES NFR BLD AUTO: 18.6 %
MONOCYTES NFR BLD AUTO: 19 %
MONOCYTES NFR BLD AUTO: 20.2 %
MONOCYTES NFR BLD AUTO: 20.6 %
MONOCYTES NFR BLD AUTO: 25.2 %
MONOCYTES NFR BLD AUTO: 3.7 %
MONOCYTES NFR BLD AUTO: 4.4 %
MONOCYTES NFR BLD AUTO: 5.4 %
MONOCYTES NFR BLD AUTO: 5.6 %
MONOCYTES NFR BLD AUTO: 6 %
MONOCYTES NFR BLD AUTO: 6.1 %
MONOCYTES NFR BLD AUTO: 6.2 %
MONOCYTES NFR BLD AUTO: 6.2 %
MONOCYTES NFR BLD AUTO: 7 %
MONOCYTES NFR BLD AUTO: 7 %
MONOCYTES NFR BLD AUTO: 7.2 %
MONOCYTES NFR BLD AUTO: 7.6 %
MONOCYTES NFR BLD AUTO: 7.9 %
MONOCYTES NFR BLD AUTO: 7.9 %
MONOCYTES NFR BLD AUTO: 8 %
MONOCYTES NFR BLD AUTO: 8 %
MONOCYTES NFR BLD AUTO: 8.5 %
MONOCYTES NFR BLD AUTO: 8.9 %
MONOCYTES NFR BLD AUTO: 9 %
MONOCYTES NFR BLD AUTO: 9.2 %
MONOCYTES NFR BLD AUTO: 9.9 %
MUCOUS THREADS #/AREA URNS LPF: PRESENT /LPF
MYELOCYTES # BLD: 0.2 10E9/L
MYELOCYTES # BLD: 0.3 10E9/L
MYELOCYTES # BLD: 0.3 10E9/L
MYELOCYTES # BLD: 0.4 10E9/L
MYELOCYTES # BLD: 0.5 10E9/L
MYELOCYTES # BLD: 0.7 10E9/L
MYELOCYTES # BLD: 0.8 10E9/L
MYELOCYTES # BLD: 1.5 10E9/L
MYELOCYTES %: 0
MYELOCYTES NFR BLD MANUAL: 0.9 %
MYELOCYTES NFR BLD MANUAL: 1.7 %
MYELOCYTES NFR BLD MANUAL: 2.7 %
MYELOCYTES NFR BLD MANUAL: 2.7 %
MYELOCYTES NFR BLD MANUAL: 3.5 %
MYELOCYTES NFR BLD MANUAL: 4.3 %
MYELOCYTES NFR BLD MANUAL: 4.4 %
MYELOCYTES NFR BLD MANUAL: 7.1 %
NEUTROPHILS # BLD AUTO: 10 10E9/L (ref 1.6–8.3)
NEUTROPHILS # BLD AUTO: 10.1 10E9/L (ref 1.6–8.3)
NEUTROPHILS # BLD AUTO: 10.2 10E9/L (ref 1.6–8.3)
NEUTROPHILS # BLD AUTO: 10.3 10E9/L (ref 1.6–8.3)
NEUTROPHILS # BLD AUTO: 10.7 10E9/L (ref 1.6–8.3)
NEUTROPHILS # BLD AUTO: 10.8 10E9/L (ref 1.6–8.3)
NEUTROPHILS # BLD AUTO: 10.9 10E9/L (ref 1.6–8.3)
NEUTROPHILS # BLD AUTO: 11.1 10E9/L (ref 1.6–8.3)
NEUTROPHILS # BLD AUTO: 11.1 10E9/L (ref 1.6–8.3)
NEUTROPHILS # BLD AUTO: 11.2 10E9/L (ref 1.6–8.3)
NEUTROPHILS # BLD AUTO: 11.3 10E9/L (ref 1.6–8.3)
NEUTROPHILS # BLD AUTO: 11.4 10E9/L (ref 1.6–8.3)
NEUTROPHILS # BLD AUTO: 13.3 10E9/L (ref 1.6–8.3)
NEUTROPHILS # BLD AUTO: 14.7 10E9/L (ref 1.6–8.3)
NEUTROPHILS # BLD AUTO: 14.8 10E9/L (ref 1.6–8.3)
NEUTROPHILS # BLD AUTO: 15 10E9/L (ref 1.6–8.3)
NEUTROPHILS # BLD AUTO: 15.4 10E9/L (ref 1.6–8.3)
NEUTROPHILS # BLD AUTO: 18.4 10E9/L (ref 1.6–8.3)
NEUTROPHILS # BLD AUTO: 2.8 10E9/L (ref 1.6–8.3)
NEUTROPHILS # BLD AUTO: 21.2 10E9/L (ref 1.6–8.3)
NEUTROPHILS # BLD AUTO: 26.8 10E9/L (ref 1.6–8.3)
NEUTROPHILS # BLD AUTO: 3.4 10E9/L (ref 1.6–8.3)
NEUTROPHILS # BLD AUTO: 3.8 10E9/L (ref 1.6–8.3)
NEUTROPHILS # BLD AUTO: 30.5 10E9/L (ref 1.6–8.3)
NEUTROPHILS # BLD AUTO: 4.7 10*3/UL
NEUTROPHILS # BLD AUTO: 4.7 10E9/L (ref 1.6–8.3)
NEUTROPHILS # BLD AUTO: 4.8 10E9/L (ref 1.6–8.3)
NEUTROPHILS # BLD AUTO: 5 10E9/L (ref 1.6–8.3)
NEUTROPHILS # BLD AUTO: 5.3 10*3/UL
NEUTROPHILS # BLD AUTO: 5.3 10E9/L (ref 1.6–8.3)
NEUTROPHILS # BLD AUTO: 5.5 10*3/UL
NEUTROPHILS # BLD AUTO: 5.5 10E9/L (ref 1.6–8.3)
NEUTROPHILS # BLD AUTO: 5.5 10E9/L (ref 1.6–8.3)
NEUTROPHILS # BLD AUTO: 5.6 10E9/L (ref 1.6–8.3)
NEUTROPHILS # BLD AUTO: 5.9 10E9/L (ref 1.6–8.3)
NEUTROPHILS # BLD AUTO: 6 10E9/L (ref 1.6–8.3)
NEUTROPHILS # BLD AUTO: 6.1 10*3/UL
NEUTROPHILS # BLD AUTO: 6.2 10E9/L (ref 1.6–8.3)
NEUTROPHILS # BLD AUTO: 6.7 10E9/L (ref 1.6–8.3)
NEUTROPHILS # BLD AUTO: 6.9 10E9/L (ref 1.6–8.3)
NEUTROPHILS # BLD AUTO: 7 10E9/L (ref 1.6–8.3)
NEUTROPHILS # BLD AUTO: 7.3 10E9/L (ref 1.6–8.3)
NEUTROPHILS # BLD AUTO: 7.3 10E9/L (ref 1.6–8.3)
NEUTROPHILS # BLD AUTO: 7.5 10E9/L (ref 1.6–8.3)
NEUTROPHILS # BLD AUTO: 7.7 10E9/L (ref 1.6–8.3)
NEUTROPHILS # BLD AUTO: 7.8 10E9/L (ref 1.6–8.3)
NEUTROPHILS # BLD AUTO: 8 10E9/L (ref 1.6–8.3)
NEUTROPHILS # BLD AUTO: 8.2 10E9/L (ref 1.6–8.3)
NEUTROPHILS # BLD AUTO: 8.2 10E9/L (ref 1.6–8.3)
NEUTROPHILS # BLD AUTO: 8.3 10E9/L (ref 1.6–8.3)
NEUTROPHILS # BLD AUTO: 8.8 10E9/L (ref 1.6–8.3)
NEUTROPHILS # BLD AUTO: 8.9 10*3/UL
NEUTROPHILS # BLD AUTO: 9.3 10E9/L (ref 1.6–8.3)
NEUTROPHILS # BLD AUTO: 9.3 10E9/L (ref 1.6–8.3)
NEUTROPHILS # BLD AUTO: 9.6 10E9/L (ref 1.6–8.3)
NEUTROPHILS NFR BLD AUTO: 48 %
NEUTROPHILS NFR BLD AUTO: 49.1 %
NEUTROPHILS NFR BLD AUTO: 49.8 %
NEUTROPHILS NFR BLD AUTO: 54.8 %
NEUTROPHILS NFR BLD AUTO: 54.9 %
NEUTROPHILS NFR BLD AUTO: 55.1 %
NEUTROPHILS NFR BLD AUTO: 56.7 %
NEUTROPHILS NFR BLD AUTO: 58.2 %
NEUTROPHILS NFR BLD AUTO: 58.6 %
NEUTROPHILS NFR BLD AUTO: 58.7 %
NEUTROPHILS NFR BLD AUTO: 59.3 %
NEUTROPHILS NFR BLD AUTO: 59.8 %
NEUTROPHILS NFR BLD AUTO: 59.9 %
NEUTROPHILS NFR BLD AUTO: 60.1 %
NEUTROPHILS NFR BLD AUTO: 60.2 %
NEUTROPHILS NFR BLD AUTO: 60.5 %
NEUTROPHILS NFR BLD AUTO: 60.9 %
NEUTROPHILS NFR BLD AUTO: 61.7 %
NEUTROPHILS NFR BLD AUTO: 62.1 %
NEUTROPHILS NFR BLD AUTO: 62.4 %
NEUTROPHILS NFR BLD AUTO: 62.6 %
NEUTROPHILS NFR BLD AUTO: 62.7 %
NEUTROPHILS NFR BLD AUTO: 62.8 %
NEUTROPHILS NFR BLD AUTO: 62.9 %
NEUTROPHILS NFR BLD AUTO: 62.9 %
NEUTROPHILS NFR BLD AUTO: 64.4 %
NEUTROPHILS NFR BLD AUTO: 64.4 %
NEUTROPHILS NFR BLD AUTO: 64.8 %
NEUTROPHILS NFR BLD AUTO: 65.7 %
NEUTROPHILS NFR BLD AUTO: 65.9 %
NEUTROPHILS NFR BLD AUTO: 66.3 %
NEUTROPHILS NFR BLD AUTO: 66.5 %
NEUTROPHILS NFR BLD AUTO: 66.7 %
NEUTROPHILS NFR BLD AUTO: 67.2 %
NEUTROPHILS NFR BLD AUTO: 67.3 %
NEUTROPHILS NFR BLD AUTO: 68.1 %
NEUTROPHILS NFR BLD AUTO: 68.4 %
NEUTROPHILS NFR BLD AUTO: 68.7 %
NEUTROPHILS NFR BLD AUTO: 69.2 %
NEUTROPHILS NFR BLD AUTO: 72 %
NEUTROPHILS NFR BLD AUTO: 73.1 %
NEUTROPHILS NFR BLD AUTO: 73.2 %
NEUTROPHILS NFR BLD AUTO: 74.2 %
NEUTROPHILS NFR BLD AUTO: 75.2 %
NEUTROPHILS NFR BLD AUTO: 75.6 %
NEUTROPHILS NFR BLD AUTO: 76.5 %
NEUTROPHILS NFR BLD AUTO: 76.5 %
NEUTROPHILS NFR BLD AUTO: 79.8 %
NEUTROPHILS NFR BLD AUTO: 80.1 %
NEUTROPHILS NFR BLD AUTO: 80.5 %
NEUTROPHILS NFR BLD AUTO: 80.6 %
NEUTROPHILS NFR BLD AUTO: 80.7 %
NEUTROPHILS NFR BLD AUTO: 80.9 %
NEUTROPHILS NFR BLD AUTO: 81.4 %
NEUTROPHILS NFR BLD AUTO: 83.1 %
NEUTROPHILS NFR BLD AUTO: 86.5 %
NEUTROPHILS NFR BLD AUTO: 88 %
NEUTROPHILS NFR BLD AUTO: 92.9 %
NITRATE UR QL: ABNORMAL
NITRATE UR QL: NEGATIVE
NONHDLC SERPL-MCNC: NORMAL MG/DL
NOROV GI+II ORF1-ORF2 JNC STL QL NAA+PR: NOT DETECTED
NRBC # BLD AUTO: 0 10*3/UL
NRBC # BLD AUTO: 0.1 10*3/UL
NRBC # BLD AUTO: 0.1 10*3/UL
NRBC BLD AUTO-RTO: 0 /100
NRBC BLD AUTO-RTO: 1 /100
NRBC BLD AUTO-RTO: 1 /100
NUM BPU REQUESTED: 1
NUM BPU REQUESTED: 1
NUM BPU REQUESTED: 2
OSMOLALITY SERPL: 271 MMOL/KG (ref 275–295)
OSMOLALITY SERPL: 279 MMOL/KG (ref 275–295)
OSMOLALITY SERPL: 295 MMOL/KG (ref 275–295)
OSMOLALITY UR: 269 MMOL/KG (ref 100–1200)
OSMOLALITY UR: 557 MMOL/KG (ref 100–1200)
OSMOLALITY UR: 644 MMOL/KG (ref 100–1200)
PCO2 BLDV: 36 MM HG (ref 40–50)
PH BLDV: 7.39 PH (ref 7.32–7.43)
PH UR STRIP: 5 PH (ref 5–7)
PH UR STRIP: 5 PH (ref 5–7)
PH UR STRIP: 5.5 PH (ref 5–7)
PH UR STRIP: 5.5 PH (ref 5–7)
PH UR STRIP: 6 PH (ref 5–7)
PH UR STRIP: 6.5 PH (ref 5–7)
PH UR STRIP: 6.5 PH (ref 5–7)
PH UR STRIP: ABNORMAL PH (ref 5–7)
PHOSPHATE SERPL-MCNC: 1.9 MG/DL (ref 2.5–4.5)
PHOSPHATE SERPL-MCNC: 2.2 MG/DL (ref 2.5–4.5)
PHOSPHATE SERPL-MCNC: 2.3 MG/DL (ref 2.5–4.5)
PHOSPHATE SERPL-MCNC: 2.4 MG/DL (ref 2.5–4.5)
PHOSPHATE SERPL-MCNC: 2.4 MG/DL (ref 2.5–4.5)
PHOSPHATE SERPL-MCNC: 2.6 MG/DL (ref 2.5–4.5)
PHOSPHATE SERPL-MCNC: 2.7 MG/DL (ref 2.5–4.5)
PHOSPHATE SERPL-MCNC: 2.8 MG/DL (ref 2.5–4.5)
PHOSPHATE SERPL-MCNC: 2.8 MG/DL (ref 2.5–4.5)
PHOSPHATE SERPL-MCNC: 2.9 MG/DL (ref 2.5–4.5)
PHOSPHATE SERPL-MCNC: 3 MG/DL (ref 2.5–4.5)
PHOSPHATE SERPL-MCNC: 3.1 MG/DL (ref 2.5–4.5)
PHOSPHATE SERPL-MCNC: 3.2 MG/DL (ref 2.5–4.5)
PHOSPHATE SERPL-MCNC: 3.3 MG/DL (ref 2.5–4.5)
PHOSPHATE SERPL-MCNC: 3.4 MG/DL (ref 2.5–4.5)
PHOSPHATE SERPL-MCNC: 3.5 MG/DL (ref 2.5–4.5)
PHOSPHATE SERPL-MCNC: 3.6 MG/DL
PHOSPHATE SERPL-MCNC: 3.6 MG/DL (ref 2.5–4.5)
PHOSPHATE SERPL-MCNC: 3.7 MG/DL (ref 2.5–4.5)
PHOSPHATE SERPL-MCNC: 3.7 MG/DL (ref 2.5–4.5)
PHOSPHATE SERPL-MCNC: 3.8 MG/DL (ref 2.5–4.5)
PHOSPHATE SERPL-MCNC: 3.9 MG/DL (ref 2.5–4.5)
PHOSPHATE SERPL-MCNC: 3.9 MG/DL (ref 2.5–4.5)
PHOSPHATE SERPL-MCNC: 4 MG/DL (ref 2.5–4.5)
PHOSPHATE SERPL-MCNC: 4.1 MG/DL (ref 2.5–4.5)
PHOSPHATE SERPL-MCNC: 4.2 MG/DL
PHOSPHATE SERPL-MCNC: 4.2 MG/DL (ref 2.5–4.5)
PHOSPHATE SERPL-MCNC: 4.3 MG/DL
PHOSPHATE SERPL-MCNC: 4.4 MG/DL
PHOSPHATE SERPL-MCNC: 4.5 MG/DL (ref 2.5–4.5)
PHOSPHATE SERPL-MCNC: 4.5 MG/DL (ref 2.5–4.5)
PHOSPHATE SERPL-MCNC: 4.6 MG/DL (ref 2.5–4.5)
PHOSPHATE SERPL-MCNC: 4.6 MG/DL (ref 2.5–4.5)
PHOSPHATE SERPL-MCNC: 5 MG/DL (ref 2.5–4.5)
PHOSPHATE SERPL-MCNC: 5.4 MG/DL
PHOSPHATE SERPL-MCNC: 5.5 MG/DL (ref 2.5–4.5)
PHOSPHATE SERPL-MCNC: 6.4 MG/DL (ref 2.5–4.5)
PLATELET # BLD AUTO: 292 10E9/L (ref 150–450)
PLATELET # BLD AUTO: 301 10E9/L (ref 150–450)
PLATELET # BLD AUTO: 309 10E9/L (ref 150–450)
PLATELET # BLD AUTO: 317 10E9/L (ref 150–450)
PLATELET # BLD AUTO: 320 10E9/L (ref 150–450)
PLATELET # BLD AUTO: 320 10E9/L (ref 150–450)
PLATELET # BLD AUTO: 324 10E9/L (ref 150–450)
PLATELET # BLD AUTO: 330 10E9/L (ref 150–450)
PLATELET # BLD AUTO: 331 10E9/L (ref 150–450)
PLATELET # BLD AUTO: 341 10E9/L (ref 150–450)
PLATELET # BLD AUTO: 354 10E9/L (ref 150–450)
PLATELET # BLD AUTO: 356 10E9/L (ref 150–450)
PLATELET # BLD AUTO: 368 10E9/L (ref 150–450)
PLATELET # BLD AUTO: 369 10E9/L (ref 150–450)
PLATELET # BLD AUTO: 382 10E9/L (ref 150–450)
PLATELET # BLD AUTO: 383 10E9/L (ref 150–450)
PLATELET # BLD AUTO: 383 10E9/L (ref 150–450)
PLATELET # BLD AUTO: 385 10E9/L (ref 150–450)
PLATELET # BLD AUTO: 389 10E9/L (ref 150–450)
PLATELET # BLD AUTO: 390 10E9/L (ref 150–450)
PLATELET # BLD AUTO: 393 10E9/L (ref 150–450)
PLATELET # BLD AUTO: 394 10E9/L (ref 150–450)
PLATELET # BLD AUTO: 395 10E9/L (ref 150–450)
PLATELET # BLD AUTO: 396 10E9/L (ref 150–450)
PLATELET # BLD AUTO: 407 10E9/L (ref 150–450)
PLATELET # BLD AUTO: 407 10E9/L (ref 150–450)
PLATELET # BLD AUTO: 411 10E9/L (ref 150–450)
PLATELET # BLD AUTO: 414 10E9/L (ref 150–450)
PLATELET # BLD AUTO: 414 10E9/L (ref 150–450)
PLATELET # BLD AUTO: 417 10E9/L (ref 150–450)
PLATELET # BLD AUTO: 418 10E9/L (ref 150–450)
PLATELET # BLD AUTO: 424 10E9/L (ref 150–450)
PLATELET # BLD AUTO: 427 10E9/L (ref 150–450)
PLATELET # BLD AUTO: 428 10E9/L (ref 150–450)
PLATELET # BLD AUTO: 428 10E9/L (ref 150–450)
PLATELET # BLD AUTO: 435 10E9/L (ref 150–450)
PLATELET # BLD AUTO: 439 10E9/L (ref 150–450)
PLATELET # BLD AUTO: 442 10E9/L (ref 150–450)
PLATELET # BLD AUTO: 443 10E9/L (ref 150–450)
PLATELET # BLD AUTO: 446 10E9/L (ref 150–450)
PLATELET # BLD AUTO: 453 10E9/L (ref 150–450)
PLATELET # BLD AUTO: 463 10E9/L (ref 150–450)
PLATELET # BLD AUTO: 463 10E9/L (ref 150–450)
PLATELET # BLD AUTO: 464 10E9/L (ref 150–450)
PLATELET # BLD AUTO: 465 10E9/L (ref 150–450)
PLATELET # BLD AUTO: 465 10E9/L (ref 150–450)
PLATELET # BLD AUTO: 469 10E9/L (ref 150–450)
PLATELET # BLD AUTO: 483 10E9/L (ref 150–450)
PLATELET # BLD AUTO: 484 10E9/L (ref 150–450)
PLATELET # BLD AUTO: 485 10E9/L (ref 150–450)
PLATELET # BLD AUTO: 487 10E9/L (ref 150–450)
PLATELET # BLD AUTO: 494 10E9/L (ref 150–450)
PLATELET # BLD AUTO: 499 10E9/L (ref 150–450)
PLATELET # BLD AUTO: 505 10E9/L (ref 150–450)
PLATELET # BLD AUTO: 507 10E9/L (ref 150–450)
PLATELET # BLD AUTO: 512 10E9/L (ref 150–450)
PLATELET # BLD AUTO: 523 10E9/L (ref 150–450)
PLATELET # BLD AUTO: 534 10E9/L (ref 150–450)
PLATELET # BLD AUTO: 548 10E9/L (ref 150–450)
PLATELET # BLD AUTO: 564 10E9/L (ref 150–450)
PLATELET # BLD AUTO: 577 10E9/L (ref 150–450)
PLATELET # BLD AUTO: 577 10E9/L (ref 150–450)
PLATELET # BLD AUTO: 583 10E9/L (ref 150–450)
PLATELET # BLD AUTO: 583 10E9/L (ref 150–450)
PLATELET # BLD AUTO: 613 10E9/L (ref 150–450)
PLATELET # BLD AUTO: 626 10E9/L (ref 150–450)
PLATELET # BLD AUTO: 637 10E9/L (ref 150–450)
PLATELET # BLD AUTO: 704 10E9/L (ref 150–450)
PLATELET # BLD EST: ABNORMAL 10*3/UL
PLATELET COUNT - QUEST: 247 10^9/L (ref 150–450)
PLATELET COUNT - QUEST: 390 10^9/L (ref 150–450)
PLATELET COUNT - QUEST: 423 10^9/L (ref 150–450)
PLATELET COUNT - QUEST: 508 10^9/L (ref 140–440)
PLATELET COUNT - QUEST: 829 10^9/L (ref 150–450)
PLATELET COUNT - QUEST: ABNORMAL 10^9/L (ref 150–450)
PMV BLD: 10.4 FL
PMV BLD: 10.6 FL
PMV BLD: 10.6 FL
PMV BLD: 10.9 FL
PMV BLD: 11.5 FL (ref 6.5–11)
PMV BLD: 9.6 FL
PO2 BLDV: 31 MM HG (ref 25–47)
POIKILOCYTOSIS BLD QL SMEAR: ABNORMAL
POIKILOCYTOSIS BLD QL SMEAR: SLIGHT
POTASSIUM SERPL-SCNC: 3 MMOL/L (ref 3.4–5.3)
POTASSIUM SERPL-SCNC: 3.1 MMOL/L (ref 3.4–5.3)
POTASSIUM SERPL-SCNC: 3.2 MMOL/L (ref 3.4–5.3)
POTASSIUM SERPL-SCNC: 3.2 MMOL/L (ref 3.4–5.3)
POTASSIUM SERPL-SCNC: 3.3 MMOL/L (ref 3.4–5.3)
POTASSIUM SERPL-SCNC: 3.3 MMOL/L (ref 3.4–5.3)
POTASSIUM SERPL-SCNC: 3.4 MMOL/L (ref 3.4–5.3)
POTASSIUM SERPL-SCNC: 3.5 MMOL/L (ref 3.4–5.3)
POTASSIUM SERPL-SCNC: 3.6 MMOL/L
POTASSIUM SERPL-SCNC: 3.6 MMOL/L (ref 3.4–5.3)
POTASSIUM SERPL-SCNC: 3.7 MMOL/L (ref 3.4–5.3)
POTASSIUM SERPL-SCNC: 3.8 MMOL/L (ref 3.4–5.3)
POTASSIUM SERPL-SCNC: 3.9 MMOL/L (ref 3.4–5.3)
POTASSIUM SERPL-SCNC: 4 MMOL/L (ref 3.4–5.3)
POTASSIUM SERPL-SCNC: 4.1 MMOL/L (ref 3.4–5.3)
POTASSIUM SERPL-SCNC: 4.2 MMOL/L (ref 3.4–5.3)
POTASSIUM SERPL-SCNC: 4.3 MMOL/L (ref 3.4–5.3)
POTASSIUM SERPL-SCNC: 4.4 MMOL/L
POTASSIUM SERPL-SCNC: 4.4 MMOL/L
POTASSIUM SERPL-SCNC: 4.4 MMOL/L (ref 3.4–5.3)
POTASSIUM SERPL-SCNC: 4.5 MMOL/L
POTASSIUM SERPL-SCNC: 4.5 MMOL/L (ref 3.4–5.3)
POTASSIUM SERPL-SCNC: 4.5 MMOL/L (ref 3.4–5.3)
POTASSIUM SERPL-SCNC: 4.6 MMOL/L
POTASSIUM SERPL-SCNC: 4.6 MMOL/L (ref 3.4–5.3)
POTASSIUM SERPL-SCNC: 4.6 MMOL/L (ref 3.4–5.3)
POTASSIUM SERPL-SCNC: 4.7 MMOL/L (ref 3.4–5.3)
POTASSIUM SERPL-SCNC: 4.7 MMOL/L (ref 3.4–5.3)
POTASSIUM SERPL-SCNC: 4.8 MMOL/L (ref 3.4–5.3)
POTASSIUM SERPL-SCNC: 4.8 MMOL/L (ref 3.4–5.3)
POTASSIUM SERPL-SCNC: 4.9 MMOL/L
POTASSIUM SERPL-SCNC: 4.9 MMOL/L (ref 3.4–5.3)
POTASSIUM SERPL-SCNC: 5.2 MMOL/L (ref 3.4–5.3)
PREALB SERPL IA-MCNC: 10 MG/DL
PREALB SERPL IA-MCNC: 10 MG/DL (ref 15–45)
PREALB SERPL IA-MCNC: 18 MG/DL (ref 15–45)
PREALB SERPL IA-MCNC: 18 MG/DL (ref 15–45)
PREALB SERPL IA-MCNC: 22 MG/DL (ref 15–45)
PREALB SERPL IA-MCNC: 33 MG/DL
PREALB SERPL IA-MCNC: 4 MG/DL (ref 15–45)
PREALB SERPL IA-MCNC: 7.2 MG/DL (ref 16–38)
PROMYELOCYTES # BLD MANUAL: 0.1 10E9/L
PROMYELOCYTES # BLD MANUAL: 0.2 10E9/L
PROMYELOCYTES # BLD MANUAL: 0.7 10E9/L
PROMYELOCYTES NFR BLD MANUAL: 0.8 %
PROMYELOCYTES NFR BLD MANUAL: 0.9 %
PROMYELOCYTES NFR BLD MANUAL: 0.9 %
PROMYELOCYTES NFR BLD MANUAL: 1.8 %
PROMYELOCYTES NFR BLD MANUAL: 2.6 %
PROMYELOCYTES: 0
PROT SERPL-MCNC: 10.7 G/DL (ref 6.8–8.8)
PROT SERPL-MCNC: 6.4 G/DL (ref 6.8–8.8)
PROT SERPL-MCNC: 6.5 G/DL (ref 6.8–8.8)
PROT SERPL-MCNC: 6.6 G/DL (ref 6.8–8.8)
PROT SERPL-MCNC: 6.7 G/DL (ref 6.8–8.8)
PROT SERPL-MCNC: 7.2 G/DL (ref 6.8–8.8)
PROT SERPL-MCNC: 7.3 G/DL (ref 6.8–8.8)
PROT SERPL-MCNC: 7.4 G/DL (ref 6.8–8.8)
PROT SERPL-MCNC: 7.5 G/DL (ref 6.8–8.8)
PROT SERPL-MCNC: 7.5 G/DL (ref 6.8–8.8)
PROT SERPL-MCNC: 7.6 G/DL
PROT SERPL-MCNC: 7.6 G/DL (ref 6.8–8.8)
PROT SERPL-MCNC: 7.7 G/DL (ref 6.8–8.8)
PROT SERPL-MCNC: 7.7 G/DL (ref 6.8–8.8)
PROT SERPL-MCNC: 7.8 G/DL
PROT SERPL-MCNC: 7.8 G/DL (ref 6.8–8.8)
PROT SERPL-MCNC: 7.9 G/DL (ref 6.8–8.8)
PROT SERPL-MCNC: 8 G/DL (ref 6.8–8.8)
PROT SERPL-MCNC: 8 G/DL (ref 6.8–8.8)
PROT SERPL-MCNC: 8.1 G/DL
PROT SERPL-MCNC: 8.1 G/DL (ref 6.8–8.8)
PROT SERPL-MCNC: 8.1 G/DL (ref 6–8)
PROT SERPL-MCNC: 8.3 G/DL (ref 6.8–8.8)
PROT SERPL-MCNC: 8.3 G/DL (ref 6.8–8.8)
PROT SERPL-MCNC: 8.4 G/DL
PROT SERPL-MCNC: 8.4 G/DL (ref 6.8–8.8)
PROT SERPL-MCNC: 8.6 G/DL (ref 6.8–8.8)
PROT SERPL-MCNC: 8.8 G/DL (ref 6.8–8.8)
PROT SERPL-MCNC: 8.9 G/DL (ref 6.8–8.8)
PROT SERPL-MCNC: 9 G/DL (ref 6.8–8.8)
PROT SERPL-MCNC: 9.2 G/DL
RADIOLOGIST FLAGS: ABNORMAL
RBC # BLD AUTO: 2.53 10E12/L (ref 4.4–5.9)
RBC # BLD AUTO: 2.61 10E12/L (ref 4.4–5.9)
RBC # BLD AUTO: 2.61 10E12/L (ref 4.4–5.9)
RBC # BLD AUTO: 2.67 10E12/L (ref 4.4–5.9)
RBC # BLD AUTO: 2.68 10E12/L (ref 4.4–5.9)
RBC # BLD AUTO: 2.7 10E12/L (ref 4.4–5.9)
RBC # BLD AUTO: 2.73 10E12/L (ref 4.4–5.9)
RBC # BLD AUTO: 2.91 10E12/L (ref 4.4–5.9)
RBC # BLD AUTO: 2.96 10E12/L (ref 4.4–5.9)
RBC # BLD AUTO: 2.97 10E12/L (ref 4.4–5.9)
RBC # BLD AUTO: 3 10E12/L (ref 4.4–5.9)
RBC # BLD AUTO: 3.02 10E12/L (ref 4.4–5.9)
RBC # BLD AUTO: 3.03 10E12/L (ref 4.4–5.9)
RBC # BLD AUTO: 3.04 10E12/L (ref 4.4–5.9)
RBC # BLD AUTO: 3.07 10E12/L (ref 4.4–5.9)
RBC # BLD AUTO: 3.08 10E12/L (ref 4.4–5.9)
RBC # BLD AUTO: 3.11 10E12/L (ref 4.4–5.9)
RBC # BLD AUTO: 3.11 10^12/L (ref 4.3–5.9)
RBC # BLD AUTO: 3.14 10E12/L (ref 4.4–5.9)
RBC # BLD AUTO: 3.16 10E12/L (ref 4.4–5.9)
RBC # BLD AUTO: 3.16 10^12/L
RBC # BLD AUTO: 3.18 10E12/L (ref 4.4–5.9)
RBC # BLD AUTO: 3.19 10E12/L (ref 4.4–5.9)
RBC # BLD AUTO: 3.2 10E12/L (ref 4.4–5.9)
RBC # BLD AUTO: 3.25 10E12/L (ref 4.4–5.9)
RBC # BLD AUTO: 3.28 10E12/L (ref 4.4–5.9)
RBC # BLD AUTO: 3.29 10E12/L (ref 4.4–5.9)
RBC # BLD AUTO: 3.31 10E12/L (ref 4.4–5.9)
RBC # BLD AUTO: 3.32 10E12/L (ref 4.4–5.9)
RBC # BLD AUTO: 3.32 10E12/L (ref 4.4–5.9)
RBC # BLD AUTO: 3.33 10E12/L (ref 4.4–5.9)
RBC # BLD AUTO: 3.33 10E12/L (ref 4.4–5.9)
RBC # BLD AUTO: 3.35 10E12/L (ref 4.4–5.9)
RBC # BLD AUTO: 3.36 10E12/L (ref 4.4–5.9)
RBC # BLD AUTO: 3.37 10E12/L (ref 4.4–5.9)
RBC # BLD AUTO: 3.39 10E12/L (ref 4.4–5.9)
RBC # BLD AUTO: 3.46 10E12/L (ref 4.4–5.9)
RBC # BLD AUTO: 3.48 10^12/L
RBC # BLD AUTO: 3.53 10E12/L (ref 4.4–5.9)
RBC # BLD AUTO: 3.58 10E12/L (ref 4.4–5.9)
RBC # BLD AUTO: 3.61 10E12/L (ref 4.4–5.9)
RBC # BLD AUTO: 3.69 10E12/L (ref 4.4–5.9)
RBC # BLD AUTO: 3.76 10E12/L (ref 4.4–5.9)
RBC # BLD AUTO: 3.77 10E12/L (ref 4.4–5.9)
RBC # BLD AUTO: 3.8 10E12/L (ref 4.4–5.9)
RBC # BLD AUTO: 3.85 10E12/L (ref 4.4–5.9)
RBC # BLD AUTO: 3.88 10E12/L (ref 4.4–5.9)
RBC # BLD AUTO: 3.9 10^12/L
RBC # BLD AUTO: 3.93 10E12/L (ref 4.4–5.9)
RBC # BLD AUTO: 4.02 10^12/L
RBC # BLD AUTO: 4.17 10E12/L (ref 4.4–5.9)
RBC # BLD AUTO: 4.21 10E12/L (ref 4.4–5.9)
RBC # BLD AUTO: 4.24 10E12/L (ref 4.4–5.9)
RBC # BLD AUTO: 4.25 10^12/L
RBC # BLD AUTO: 4.34 10E12/L (ref 4.4–5.9)
RBC # BLD AUTO: 4.37 10E12/L (ref 4.4–5.9)
RBC #/AREA URNS AUTO: 0 /HPF (ref 0–2)
RBC #/AREA URNS AUTO: 1 /HPF (ref 0–2)
RBC #/AREA URNS AUTO: 1 /HPF (ref 0–2)
RBC #/AREA URNS AUTO: <1 /HPF (ref 0–2)
RBC #/AREA URNS AUTO: ABNORMAL /HPF (ref 0–2)
RBC INCLUSIONS BLD: SLIGHT
RETICS # AUTO: 30.9 10E9/L (ref 25–95)
RETICS/RBC NFR AUTO: 1 % (ref 0.5–2)
RSV RNA SPEC NAA+PROBE: NEGATIVE
RVA NSP5 STL QL NAA+PROBE: NOT DETECTED
S PNEUM AG SPEC QL: NORMAL
SALMONELLA SP RPOD STL QL NAA+PROBE: NOT DETECTED
SAO2 % BLDV FROM PO2: 59 %
SHIGELLA SP+EIEC IPAH STL QL NAA+PROBE: NOT DETECTED
SODIUM SERPL-SCNC: 120 MMOL/L (ref 133–144)
SODIUM SERPL-SCNC: 121 MMOL/L (ref 133–144)
SODIUM SERPL-SCNC: 123 MMOL/L (ref 133–144)
SODIUM SERPL-SCNC: 124 MMOL/L (ref 133–144)
SODIUM SERPL-SCNC: 125 MMOL/L (ref 133–144)
SODIUM SERPL-SCNC: 126 MMOL/L (ref 133–144)
SODIUM SERPL-SCNC: 126 MMOL/L (ref 133–144)
SODIUM SERPL-SCNC: 127 MMOL/L (ref 133–144)
SODIUM SERPL-SCNC: 128 MMOL/L (ref 133–144)
SODIUM SERPL-SCNC: 129 MMOL/L (ref 133–144)
SODIUM SERPL-SCNC: 130 MMOL/L (ref 133–144)
SODIUM SERPL-SCNC: 131 MMOL/L
SODIUM SERPL-SCNC: 131 MMOL/L (ref 133–144)
SODIUM SERPL-SCNC: 132 MMOL/L (ref 133–144)
SODIUM SERPL-SCNC: 133 MMOL/L (ref 133–144)
SODIUM SERPL-SCNC: 133 MMOL/L (ref 133–144)
SODIUM SERPL-SCNC: 134 MMOL/L
SODIUM SERPL-SCNC: 134 MMOL/L (ref 133–144)
SODIUM SERPL-SCNC: 135 MMOL/L
SODIUM SERPL-SCNC: 135 MMOL/L (ref 133–144)
SODIUM SERPL-SCNC: 136 MMOL/L (ref 133–144)
SODIUM SERPL-SCNC: 137 MMOL/L (ref 133–144)
SODIUM SERPL-SCNC: 138 MMOL/L
SODIUM SERPL-SCNC: 138 MMOL/L (ref 133–144)
SODIUM SERPL-SCNC: 139 MMOL/L (ref 133–144)
SODIUM SERPL-SCNC: 140 MMOL/L (ref 133–144)
SODIUM SERPL-SCNC: 140 MMOL/L (ref 133–144)
SODIUM SERPL-SCNC: 141 MMOL/L (ref 133–144)
SODIUM UR-SCNC: 11 MMOL/L
SODIUM UR-SCNC: 33 MMOL/L
SODIUM UR-SCNC: <5 MMOL/L
SOURCE: ABNORMAL
SOURCE: NORMAL
SP GR UR STRIP: 1 (ref 1–1.03)
SP GR UR STRIP: 1.01 (ref 1–1.03)
SP GR UR STRIP: 1.01 (ref 1–1.03)
SP GR UR STRIP: 1.02 (ref 1–1.03)
SP GR UR STRIP: 1.02 (ref 1–1.03)
SP GR UR STRIP: 1.03 (ref 1–1.03)
SP GR UR STRIP: 1.05 (ref 1–1.03)
SP GR UR STRIP: ABNORMAL (ref 1–1.03)
SPECIMEN EXP DATE BLD: NORMAL
SPECIMEN SOURCE: ABNORMAL
SPECIMEN SOURCE: NORMAL
STFR SERPL-SCNC: 2.9 MG/L (ref 2.2–5)
TARGETS BLD QL SMEAR: ABNORMAL
TARGETS BLD QL SMEAR: SLIGHT
TARGETS BLD QL SMEAR: SLIGHT
TIBC SERPL-MCNC: 149 UG/DL (ref 240–430)
TIBC SERPL-MCNC: 219 UG/DL (ref 240–430)
TIBC SERPL-MCNC: 340 UG/DL (ref 240–430)
TRANS CELLS #/AREA URNS HPF: <1 /HPF (ref 0–1)
TRANSFUSION STATUS PATIENT QL: NORMAL
TRIGL SERPL-MCNC: 135 MG/DL
TRIGL SERPL-MCNC: 160 MG/DL
TRIGL SERPL-MCNC: 168 MG/DL
TRIGL SERPL-MCNC: 227 MG/DL
TRIGL SERPL-MCNC: 57 MG/DL
TRIGL SERPL-MCNC: 61 MG/DL
TRIGL SERPL-MCNC: 64 MG/DL
TRIGL SERPL-MCNC: 68 MG/DL
TRIGL SERPL-MCNC: 75 MG/DL
TRIGL SERPL-MCNC: 80 MG/DL
TRIGL SERPL-MCNC: 84 MG/DL
TRIGL SERPL-MCNC: 88 MG/DL
UROBILINOGEN UR STRIP-MCNC: ABNORMAL MG/DL (ref 0–2)
UROBILINOGEN UR STRIP-MCNC: NORMAL MG/DL (ref 0–2)
V CHOL+PARA RFBL+TRKH+TNAA STL QL NAA+PR: NOT DETECTED
VANCOMYCIN SERPL-MCNC: 15.1 MG/L
VANCOMYCIN SERPL-MCNC: 17.2 MG/L
WBC # BLD AUTO: 10.2 10E9/L (ref 4–11)
WBC # BLD AUTO: 10.9 10E9/L (ref 4–11)
WBC # BLD AUTO: 11 10E9/L (ref 4–11)
WBC # BLD AUTO: 11.2 10E9/L (ref 4–11)
WBC # BLD AUTO: 11.5 10E9/L (ref 4–11)
WBC # BLD AUTO: 11.8 10E9/L (ref 4–11)
WBC # BLD AUTO: 11.9 10E9/L (ref 4–11)
WBC # BLD AUTO: 11.9 10E9/L (ref 4–11)
WBC # BLD AUTO: 12 10E9/L (ref 4–11)
WBC # BLD AUTO: 12.1 10E9/L (ref 4–11)
WBC # BLD AUTO: 12.1 10E9/L (ref 4–11)
WBC # BLD AUTO: 12.4 10E9/L (ref 4–11)
WBC # BLD AUTO: 12.4 10E9/L (ref 4–11)
WBC # BLD AUTO: 12.4 10^9/L
WBC # BLD AUTO: 12.6 10E9/L (ref 4–11)
WBC # BLD AUTO: 12.6 10E9/L (ref 4–11)
WBC # BLD AUTO: 12.7 10E9/L (ref 4–11)
WBC # BLD AUTO: 13.3 10E9/L (ref 4–11)
WBC # BLD AUTO: 13.5 10E9/L (ref 4–11)
WBC # BLD AUTO: 13.9 10E9/L (ref 4–11)
WBC # BLD AUTO: 14.4 10E9/L (ref 4–11)
WBC # BLD AUTO: 14.7 10E9/L (ref 4–11)
WBC # BLD AUTO: 14.8 10E9/L (ref 4–11)
WBC # BLD AUTO: 15.6 10E9/L (ref 4–11)
WBC # BLD AUTO: 16.6 10E9/L (ref 4–11)
WBC # BLD AUTO: 16.6 10E9/L (ref 4–11)
WBC # BLD AUTO: 16.7 10E9/L (ref 4–11)
WBC # BLD AUTO: 17.5 10E9/L (ref 4–11)
WBC # BLD AUTO: 17.8 10E9/L (ref 4–11)
WBC # BLD AUTO: 17.9 10E9/L (ref 4–11)
WBC # BLD AUTO: 18.2 10E9/L (ref 4–11)
WBC # BLD AUTO: 18.6 10E9/L (ref 4–11)
WBC # BLD AUTO: 18.7 10E9/L (ref 4–11)
WBC # BLD AUTO: 19.1 10E9/L (ref 4–11)
WBC # BLD AUTO: 20.5 10E9/L (ref 4–11)
WBC # BLD AUTO: 21.6 10E9/L (ref 4–11)
WBC # BLD AUTO: 22.8 10E9/L (ref 4–11)
WBC # BLD AUTO: 26.9 10E9/L (ref 4–11)
WBC # BLD AUTO: 27.3 10E9/L (ref 4–11)
WBC # BLD AUTO: 28.5 10E9/L (ref 4–11)
WBC # BLD AUTO: 33.2 10E9/L (ref 4–11)
WBC # BLD AUTO: 36.7 10E9/L (ref 4–11)
WBC # BLD AUTO: 4.6 10E9/L (ref 4–11)
WBC # BLD AUTO: 5.7 10E9/L (ref 4–11)
WBC # BLD AUTO: 6 10^9/L
WBC # BLD AUTO: 7 10E9/L (ref 4–11)
WBC # BLD AUTO: 7.3 10E9/L (ref 4–11)
WBC # BLD AUTO: 7.3 10E9/L (ref 4–11)
WBC # BLD AUTO: 7.7 10E9/L (ref 4–11)
WBC # BLD AUTO: 7.8 10E9/L (ref 4–11)
WBC # BLD AUTO: 7.8 10E9/L (ref 4–11)
WBC # BLD AUTO: 7.9 10E9/L (ref 4–11)
WBC # BLD AUTO: 7.9 10^9/L
WBC # BLD AUTO: 8.2 10^9/L
WBC # BLD AUTO: 8.5 10E9/L (ref 4–11)
WBC # BLD AUTO: 8.6 10E9/L (ref 4–11)
WBC # BLD AUTO: 8.9 10E9/L (ref 4–11)
WBC # BLD AUTO: 8.9 10E9/L (ref 4–11)
WBC # BLD AUTO: 9.1 10E9/L (ref 4–11)
WBC # BLD AUTO: 9.1 10E9/L (ref 4–11)
WBC # BLD AUTO: 9.1 10^9/L
WBC # BLD AUTO: 9.2 10E9/L (ref 4–11)
WBC # BLD AUTO: 9.3 10E9/L (ref 4–11)
WBC # BLD AUTO: 9.6 10E9/L (ref 4–11)
WBC # BLD AUTO: 9.7 10E9/L (ref 4–11)
WBC # BLD AUTO: 9.8 10^9/L
WBC # BLD AUTO: 9.9 10E9/L (ref 4–11)
WBC # BLD AUTO: 9.9 10E9/L (ref 4–11)
WBC #/AREA URNS AUTO: 0 /HPF (ref 0–2)
WBC #/AREA URNS AUTO: 0 /HPF (ref 0–5)
WBC #/AREA URNS AUTO: 1 /HPF (ref 0–5)
WBC #/AREA URNS AUTO: 2 /HPF (ref 0–2)
WBC #/AREA URNS AUTO: 2 /HPF (ref 0–5)
WBC #/AREA URNS AUTO: <1 /HPF (ref 0–5)
WBC #/AREA URNS AUTO: <1 /HPF (ref 0–5)
WBC #/AREA URNS AUTO: ABNORMAL /HPF (ref 0–5)
Y ENTERO RECN STL QL NAA+PROBE: NOT DETECTED
ZINC SERPL-MCNC: 66 UG/DL (ref 60–120)

## 2018-01-01 PROCEDURE — 99233 SBSQ HOSP IP/OBS HIGH 50: CPT | Performed by: NURSE PRACTITIONER

## 2018-01-01 PROCEDURE — 85027 COMPLETE CBC AUTOMATED: CPT | Performed by: PHYSICIAN ASSISTANT

## 2018-01-01 PROCEDURE — 99285 EMERGENCY DEPT VISIT HI MDM: CPT | Mod: Z6 | Performed by: EMERGENCY MEDICINE

## 2018-01-01 PROCEDURE — 12000008 ZZH R&B INTERMEDIATE UMMC

## 2018-01-01 PROCEDURE — 96361 HYDRATE IV INFUSION ADD-ON: CPT | Performed by: EMERGENCY MEDICINE

## 2018-01-01 PROCEDURE — 80053 COMPREHEN METABOLIC PANEL: CPT | Performed by: PHYSICIAN ASSISTANT

## 2018-01-01 PROCEDURE — 96413 CHEMO IV INFUSION 1 HR: CPT

## 2018-01-01 PROCEDURE — 99215 OFFICE O/P EST HI 40 MIN: CPT | Mod: GC | Performed by: INTERNAL MEDICINE

## 2018-01-01 PROCEDURE — 96368 THER/DIAG CONCURRENT INF: CPT

## 2018-01-01 PROCEDURE — 00000146 ZZHCL STATISTIC GLUCOSE BY METER IP

## 2018-01-01 PROCEDURE — 25000128 H RX IP 250 OP 636: Performed by: EMERGENCY MEDICINE

## 2018-01-01 PROCEDURE — 25000125 ZZHC RX 250: Performed by: INTERNAL MEDICINE

## 2018-01-01 PROCEDURE — 97161 PT EVAL LOW COMPLEX 20 MIN: CPT | Mod: GP

## 2018-01-01 PROCEDURE — 80053 COMPREHEN METABOLIC PANEL: CPT | Performed by: INTERNAL MEDICINE

## 2018-01-01 PROCEDURE — 99215 OFFICE O/P EST HI 40 MIN: CPT | Mod: ZP | Performed by: INTERNAL MEDICINE

## 2018-01-01 PROCEDURE — 25000128 H RX IP 250 OP 636: Performed by: PHYSICIAN ASSISTANT

## 2018-01-01 PROCEDURE — 36592 COLLECT BLOOD FROM PICC: CPT

## 2018-01-01 PROCEDURE — 96367 TX/PROPH/DG ADDL SEQ IV INF: CPT

## 2018-01-01 PROCEDURE — 84630 ASSAY OF ZINC: CPT | Performed by: INTERNAL MEDICINE

## 2018-01-01 PROCEDURE — G0463 HOSPITAL OUTPT CLINIC VISIT: HCPCS | Mod: ZF

## 2018-01-01 PROCEDURE — 36415 COLL VENOUS BLD VENIPUNCTURE: CPT | Performed by: INTERNAL MEDICINE

## 2018-01-01 PROCEDURE — 96375 TX/PRO/DX INJ NEW DRUG ADDON: CPT | Performed by: EMERGENCY MEDICINE

## 2018-01-01 PROCEDURE — 36592 COLLECT BLOOD FROM PICC: CPT | Performed by: INTERNAL MEDICINE

## 2018-01-01 PROCEDURE — 40000802 ZZH SITE CHECK

## 2018-01-01 PROCEDURE — 85025 COMPLETE CBC W/AUTO DIFF WBC: CPT | Performed by: PHYSICIAN ASSISTANT

## 2018-01-01 PROCEDURE — 74018 RADEX ABDOMEN 1 VIEW: CPT

## 2018-01-01 PROCEDURE — 99233 SBSQ HOSP IP/OBS HIGH 50: CPT | Performed by: INTERNAL MEDICINE

## 2018-01-01 PROCEDURE — 36592 COLLECT BLOOD FROM PICC: CPT | Performed by: PHYSICIAN ASSISTANT

## 2018-01-01 PROCEDURE — 27210995 ZZH RX 272: Performed by: INTERNAL MEDICINE

## 2018-01-01 PROCEDURE — 83735 ASSAY OF MAGNESIUM: CPT | Performed by: INTERNAL MEDICINE

## 2018-01-01 PROCEDURE — 96374 THER/PROPH/DIAG INJ IV PUSH: CPT | Mod: 59 | Performed by: EMERGENCY MEDICINE

## 2018-01-01 PROCEDURE — 96375 TX/PRO/DX INJ NEW DRUG ADDON: CPT

## 2018-01-01 PROCEDURE — 83935 ASSAY OF URINE OSMOLALITY: CPT | Performed by: NURSE PRACTITIONER

## 2018-01-01 PROCEDURE — 83605 ASSAY OF LACTIC ACID: CPT | Performed by: INTERNAL MEDICINE

## 2018-01-01 PROCEDURE — 80053 COMPREHEN METABOLIC PANEL: CPT | Performed by: NURSE PRACTITIONER

## 2018-01-01 PROCEDURE — 25000128 H RX IP 250 OP 636: Mod: ZF | Performed by: PHYSICIAN ASSISTANT

## 2018-01-01 PROCEDURE — G0463 HOSPITAL OUTPT CLINIC VISIT: HCPCS | Mod: 27

## 2018-01-01 PROCEDURE — 87070 CULTURE OTHR SPECIMN AEROBIC: CPT | Performed by: INTERNAL MEDICINE

## 2018-01-01 PROCEDURE — 84132 ASSAY OF SERUM POTASSIUM: CPT | Performed by: INTERNAL MEDICINE

## 2018-01-01 PROCEDURE — 99232 SBSQ HOSP IP/OBS MODERATE 35: CPT | Performed by: INTERNAL MEDICINE

## 2018-01-01 PROCEDURE — 25000132 ZZH RX MED GY IP 250 OP 250 PS 637: Performed by: INTERNAL MEDICINE

## 2018-01-01 PROCEDURE — 82565 ASSAY OF CREATININE: CPT | Performed by: PHYSICIAN ASSISTANT

## 2018-01-01 PROCEDURE — 36592 COLLECT BLOOD FROM PICC: CPT | Performed by: SURGERY

## 2018-01-01 PROCEDURE — 84100 ASSAY OF PHOSPHORUS: CPT | Performed by: INTERNAL MEDICINE

## 2018-01-01 PROCEDURE — 96416 CHEMO PROLONG INFUSE W/PUMP: CPT

## 2018-01-01 PROCEDURE — 83735 ASSAY OF MAGNESIUM: CPT | Performed by: PHYSICIAN ASSISTANT

## 2018-01-01 PROCEDURE — 99607 MTMS BY PHARM ADDL 15 MIN: CPT | Performed by: PHARMACIST

## 2018-01-01 PROCEDURE — 25000128 H RX IP 250 OP 636: Performed by: INTERNAL MEDICINE

## 2018-01-01 PROCEDURE — 85025 COMPLETE CBC W/AUTO DIFF WBC: CPT | Performed by: INTERNAL MEDICINE

## 2018-01-01 PROCEDURE — 74177 CT ABD & PELVIS W/CONTRAST: CPT

## 2018-01-01 PROCEDURE — 25000128 H RX IP 250 OP 636: Performed by: RADIOLOGY

## 2018-01-01 PROCEDURE — 80048 BASIC METABOLIC PNL TOTAL CA: CPT | Performed by: INTERNAL MEDICINE

## 2018-01-01 PROCEDURE — 25000125 ZZHC RX 250: Performed by: PHYSICIAN ASSISTANT

## 2018-01-01 PROCEDURE — G0498 CHEMO EXTEND IV INFUS W/PUMP: HCPCS

## 2018-01-01 PROCEDURE — 85027 COMPLETE CBC AUTOMATED: CPT | Performed by: INTERNAL MEDICINE

## 2018-01-01 PROCEDURE — 85730 THROMBOPLASTIN TIME PARTIAL: CPT | Performed by: INTERNAL MEDICINE

## 2018-01-01 PROCEDURE — 99232 SBSQ HOSP IP/OBS MODERATE 35: CPT | Mod: GC | Performed by: INTERNAL MEDICINE

## 2018-01-01 PROCEDURE — 84100 ASSAY OF PHOSPHORUS: CPT | Performed by: PHYSICIAN ASSISTANT

## 2018-01-01 PROCEDURE — 36591 DRAW BLOOD OFF VENOUS DEVICE: CPT

## 2018-01-01 PROCEDURE — 84478 ASSAY OF TRIGLYCERIDES: CPT | Performed by: INTERNAL MEDICINE

## 2018-01-01 PROCEDURE — 27210904 ZZH KIT CR6

## 2018-01-01 PROCEDURE — 25000128 H RX IP 250 OP 636: Performed by: NURSE ANESTHETIST, CERTIFIED REGISTERED

## 2018-01-01 PROCEDURE — 84295 ASSAY OF SERUM SODIUM: CPT | Performed by: NURSE PRACTITIONER

## 2018-01-01 PROCEDURE — 40000558 ZZH STATISTIC CVC DRESSING CHANGE

## 2018-01-01 PROCEDURE — 25000128 H RX IP 250 OP 636: Performed by: STUDENT IN AN ORGANIZED HEALTH CARE EDUCATION/TRAINING PROGRAM

## 2018-01-01 PROCEDURE — 27210195 ZZH KIT POWER PICC DOUBLE LUMEN

## 2018-01-01 PROCEDURE — 96417 CHEMO IV INFUS EACH ADDL SEQ: CPT

## 2018-01-01 PROCEDURE — 97116 GAIT TRAINING THERAPY: CPT | Mod: GP

## 2018-01-01 PROCEDURE — 37000009 ZZH ANESTHESIA TECHNICAL FEE, EACH ADDTL 15 MIN: Performed by: INTERNAL MEDICINE

## 2018-01-01 PROCEDURE — 25000128 H RX IP 250 OP 636: Mod: ZF | Performed by: INTERNAL MEDICINE

## 2018-01-01 PROCEDURE — 83930 ASSAY OF BLOOD OSMOLALITY: CPT | Performed by: INTERNAL MEDICINE

## 2018-01-01 PROCEDURE — 96365 THER/PROPH/DIAG IV INF INIT: CPT

## 2018-01-01 PROCEDURE — 87506 IADNA-DNA/RNA PROBE TQ 6-11: CPT | Performed by: PHYSICIAN ASSISTANT

## 2018-01-01 PROCEDURE — 96415 CHEMO IV INFUSION ADDL HR: CPT

## 2018-01-01 PROCEDURE — 87040 BLOOD CULTURE FOR BACTERIA: CPT | Performed by: PHYSICIAN ASSISTANT

## 2018-01-01 PROCEDURE — 3E0436Z INTRODUCTION OF NUTRITIONAL SUBSTANCE INTO CENTRAL VEIN, PERCUTANEOUS APPROACH: ICD-10-PCS | Performed by: INTERNAL MEDICINE

## 2018-01-01 PROCEDURE — 40000170 ZZH STATISTIC PRE-PROCEDURE ASSESSMENT II: Performed by: INTERNAL MEDICINE

## 2018-01-01 PROCEDURE — 83930 ASSAY OF BLOOD OSMOLALITY: CPT | Performed by: NURSE PRACTITIONER

## 2018-01-01 PROCEDURE — C1726 CATH, BAL DIL, NON-VASCULAR: HCPCS | Performed by: INTERNAL MEDICINE

## 2018-01-01 PROCEDURE — 80048 BASIC METABOLIC PNL TOTAL CA: CPT | Performed by: PHYSICIAN ASSISTANT

## 2018-01-01 PROCEDURE — 83540 ASSAY OF IRON: CPT | Performed by: NURSE PRACTITIONER

## 2018-01-01 PROCEDURE — 86923 COMPATIBILITY TEST ELECTRIC: CPT | Performed by: INTERNAL MEDICINE

## 2018-01-01 PROCEDURE — 99238 HOSP IP/OBS DSCHRG MGMT 30/<: CPT | Performed by: INTERNAL MEDICINE

## 2018-01-01 PROCEDURE — 87077 CULTURE AEROBIC IDENTIFY: CPT | Performed by: INTERNAL MEDICINE

## 2018-01-01 PROCEDURE — 36592 COLLECT BLOOD FROM PICC: CPT | Performed by: STUDENT IN AN ORGANIZED HEALTH CARE EDUCATION/TRAINING PROGRAM

## 2018-01-01 PROCEDURE — 25000131 ZZH RX MED GY IP 250 OP 636 PS 637: Performed by: NURSE PRACTITIONER

## 2018-01-01 PROCEDURE — C1876 STENT, NON-COA/NON-COV W/DEL: HCPCS | Performed by: INTERNAL MEDICINE

## 2018-01-01 PROCEDURE — 25000132 ZZH RX MED GY IP 250 OP 250 PS 637: Performed by: PHYSICIAN ASSISTANT

## 2018-01-01 PROCEDURE — 25000566 ZZH SEVOFLURANE, EA 15 MIN: Performed by: INTERNAL MEDICINE

## 2018-01-01 PROCEDURE — 99214 OFFICE O/P EST MOD 30 MIN: CPT | Mod: ZP | Performed by: PHYSICIAN ASSISTANT

## 2018-01-01 PROCEDURE — 87077 CULTURE AEROBIC IDENTIFY: CPT | Performed by: EMERGENCY MEDICINE

## 2018-01-01 PROCEDURE — 86850 RBC ANTIBODY SCREEN: CPT | Performed by: INTERNAL MEDICINE

## 2018-01-01 PROCEDURE — 86900 BLOOD TYPING SEROLOGIC ABO: CPT | Performed by: INTERNAL MEDICINE

## 2018-01-01 PROCEDURE — 25500064 ZZH RX 255 OP 636: Performed by: INTERNAL MEDICINE

## 2018-01-01 PROCEDURE — 99215 OFFICE O/P EST HI 40 MIN: CPT | Mod: ZP | Performed by: PHYSICIAN ASSISTANT

## 2018-01-01 PROCEDURE — 85610 PROTHROMBIN TIME: CPT | Performed by: STUDENT IN AN ORGANIZED HEALTH CARE EDUCATION/TRAINING PROGRAM

## 2018-01-01 PROCEDURE — 96366 THER/PROPH/DIAG IV INF ADDON: CPT | Performed by: EMERGENCY MEDICINE

## 2018-01-01 PROCEDURE — 71000014 ZZH RECOVERY PHASE 1 LEVEL 2 FIRST HR: Performed by: INTERNAL MEDICINE

## 2018-01-01 PROCEDURE — 84478 ASSAY OF TRIGLYCERIDES: CPT | Performed by: PHYSICIAN ASSISTANT

## 2018-01-01 PROCEDURE — 96361 HYDRATE IV INFUSION ADD-ON: CPT

## 2018-01-01 PROCEDURE — 85025 COMPLETE CBC W/AUTO DIFF WBC: CPT | Performed by: EMERGENCY MEDICINE

## 2018-01-01 PROCEDURE — 83540 ASSAY OF IRON: CPT | Performed by: PHYSICIAN ASSISTANT

## 2018-01-01 PROCEDURE — 85610 PROTHROMBIN TIME: CPT | Performed by: PHYSICIAN ASSISTANT

## 2018-01-01 PROCEDURE — 84134 ASSAY OF PREALBUMIN: CPT | Performed by: PHYSICIAN ASSISTANT

## 2018-01-01 PROCEDURE — G0463 HOSPITAL OUTPT CLINIC VISIT: HCPCS | Mod: 25

## 2018-01-01 PROCEDURE — 85610 PROTHROMBIN TIME: CPT | Performed by: INTERNAL MEDICINE

## 2018-01-01 PROCEDURE — 99606 MTMS BY PHARM EST 15 MIN: CPT | Mod: U4 | Performed by: PHARMACIST

## 2018-01-01 PROCEDURE — 86901 BLOOD TYPING SEROLOGIC RH(D): CPT | Performed by: INTERNAL MEDICINE

## 2018-01-01 PROCEDURE — 87899 AGENT NOS ASSAY W/OPTIC: CPT | Performed by: INTERNAL MEDICINE

## 2018-01-01 PROCEDURE — 25000132 ZZH RX MED GY IP 250 OP 250 PS 637: Performed by: NURSE PRACTITIONER

## 2018-01-01 PROCEDURE — 96365 THER/PROPH/DIAG IV INF INIT: CPT | Performed by: EMERGENCY MEDICINE

## 2018-01-01 PROCEDURE — 25000565 ZZH ISOFLURANE, EA 15 MIN: Performed by: INTERNAL MEDICINE

## 2018-01-01 PROCEDURE — 99285 EMERGENCY DEPT VISIT HI MDM: CPT | Mod: 25 | Performed by: EMERGENCY MEDICINE

## 2018-01-01 PROCEDURE — 25000128 H RX IP 250 OP 636: Performed by: NURSE PRACTITIONER

## 2018-01-01 PROCEDURE — 36415 COLL VENOUS BLD VENIPUNCTURE: CPT | Performed by: PHYSICIAN ASSISTANT

## 2018-01-01 PROCEDURE — C1769 GUIDE WIRE: HCPCS | Performed by: INTERNAL MEDICINE

## 2018-01-01 PROCEDURE — 85045 AUTOMATED RETICULOCYTE COUNT: CPT | Performed by: NURSE PRACTITIONER

## 2018-01-01 PROCEDURE — 49440 PLACE GASTROSTOMY TUBE PERC: CPT

## 2018-01-01 PROCEDURE — 84134 ASSAY OF PREALBUMIN: CPT | Performed by: INTERNAL MEDICINE

## 2018-01-01 PROCEDURE — 87040 BLOOD CULTURE FOR BACTERIA: CPT | Performed by: INTERNAL MEDICINE

## 2018-01-01 PROCEDURE — 82803 BLOOD GASES ANY COMBINATION: CPT

## 2018-01-01 PROCEDURE — 99223 1ST HOSP IP/OBS HIGH 75: CPT | Mod: AI | Performed by: INTERNAL MEDICINE

## 2018-01-01 PROCEDURE — 27210794 ZZH OR GENERAL SUPPLY STERILE: Performed by: INTERNAL MEDICINE

## 2018-01-01 PROCEDURE — 12000001 ZZH R&B MED SURG/OB UMMC

## 2018-01-01 PROCEDURE — 87040 BLOOD CULTURE FOR BACTERIA: CPT | Performed by: EMERGENCY MEDICINE

## 2018-01-01 PROCEDURE — 27210995 ZZH RX 272: Performed by: PHYSICIAN ASSISTANT

## 2018-01-01 PROCEDURE — 25000125 ZZHC RX 250

## 2018-01-01 PROCEDURE — 87186 SC STD MICRODIL/AGAR DIL: CPT | Performed by: EMERGENCY MEDICINE

## 2018-01-01 PROCEDURE — 99231 SBSQ HOSP IP/OBS SF/LOW 25: CPT | Performed by: INTERNAL MEDICINE

## 2018-01-01 PROCEDURE — 27210577 ZZ H INTRODUCER MICRO SET

## 2018-01-01 PROCEDURE — G0463 HOSPITAL OUTPT CLINIC VISIT: HCPCS

## 2018-01-01 PROCEDURE — 85049 AUTOMATED PLATELET COUNT: CPT | Performed by: PHYSICIAN ASSISTANT

## 2018-01-01 PROCEDURE — 99153 MOD SED SAME PHYS/QHP EA: CPT

## 2018-01-01 PROCEDURE — 25000131 ZZH RX MED GY IP 250 OP 636 PS 637: Performed by: INTERNAL MEDICINE

## 2018-01-01 PROCEDURE — 0D9H80Z DRAINAGE OF CECUM WITH DRAINAGE DEVICE, VIA NATURAL OR ARTIFICIAL OPENING ENDOSCOPIC: ICD-10-PCS | Performed by: NURSE PRACTITIONER

## 2018-01-01 PROCEDURE — 83935 ASSAY OF URINE OSMOLALITY: CPT | Performed by: INTERNAL MEDICINE

## 2018-01-01 PROCEDURE — 83605 ASSAY OF LACTIC ACID: CPT

## 2018-01-01 PROCEDURE — 82565 ASSAY OF CREATININE: CPT | Performed by: INTERNAL MEDICINE

## 2018-01-01 PROCEDURE — 83690 ASSAY OF LIPASE: CPT | Performed by: EMERGENCY MEDICINE

## 2018-01-01 PROCEDURE — 96374 THER/PROPH/DIAG INJ IV PUSH: CPT | Mod: 59 | Performed by: INTERNAL MEDICINE

## 2018-01-01 PROCEDURE — 25000132 ZZH RX MED GY IP 250 OP 250 PS 637: Mod: ZF | Performed by: PHYSICIAN ASSISTANT

## 2018-01-01 PROCEDURE — 25000128 H RX IP 250 OP 636

## 2018-01-01 PROCEDURE — 36569 INSJ PICC 5 YR+ W/O IMAGING: CPT

## 2018-01-01 PROCEDURE — 99223 1ST HOSP IP/OBS HIGH 75: CPT | Performed by: NURSE PRACTITIONER

## 2018-01-01 PROCEDURE — 96360 HYDRATION IV INFUSION INIT: CPT

## 2018-01-01 PROCEDURE — 83605 ASSAY OF LACTIC ACID: CPT | Performed by: NURSE PRACTITIONER

## 2018-01-01 PROCEDURE — 81001 URINALYSIS AUTO W/SCOPE: CPT | Performed by: EMERGENCY MEDICINE

## 2018-01-01 PROCEDURE — 36000053 ZZH SURGERY LEVEL 2 EA 15 ADDTL MIN - UMMC: Performed by: INTERNAL MEDICINE

## 2018-01-01 PROCEDURE — 37000008 ZZH ANESTHESIA TECHNICAL FEE, 1ST 30 MIN: Performed by: INTERNAL MEDICINE

## 2018-01-01 PROCEDURE — 87493 C DIFF AMPLIFIED PROBE: CPT | Mod: XU | Performed by: PHYSICIAN ASSISTANT

## 2018-01-01 PROCEDURE — 83690 ASSAY OF LIPASE: CPT | Performed by: INTERNAL MEDICINE

## 2018-01-01 PROCEDURE — 84100 ASSAY OF PHOSPHORUS: CPT | Performed by: EMERGENCY MEDICINE

## 2018-01-01 PROCEDURE — 36590 REMOVAL TUNNELED CV CATH: CPT

## 2018-01-01 PROCEDURE — 40000193 ZZH STATISTIC PT WARD VISIT

## 2018-01-01 PROCEDURE — 99214 OFFICE O/P EST MOD 30 MIN: CPT | Mod: ZP | Performed by: INTERNAL MEDICINE

## 2018-01-01 PROCEDURE — 81001 URINALYSIS AUTO W/SCOPE: CPT | Performed by: INTERNAL MEDICINE

## 2018-01-01 PROCEDURE — 87086 URINE CULTURE/COLONY COUNT: CPT | Performed by: EMERGENCY MEDICINE

## 2018-01-01 PROCEDURE — 99213 OFFICE O/P EST LOW 20 MIN: CPT | Mod: GC | Performed by: EMERGENCY MEDICINE

## 2018-01-01 PROCEDURE — C9399 UNCLASSIFIED DRUGS OR BIOLOG: HCPCS | Performed by: NURSE ANESTHETIST, CERTIFIED REGISTERED

## 2018-01-01 PROCEDURE — 99222 1ST HOSP IP/OBS MODERATE 55: CPT | Mod: GC | Performed by: INTERNAL MEDICINE

## 2018-01-01 PROCEDURE — 93010 ELECTROCARDIOGRAM REPORT: CPT | Performed by: INTERNAL MEDICINE

## 2018-01-01 PROCEDURE — 0D7N8DZ DILATION OF SIGMOID COLON WITH INTRALUMINAL DEVICE, VIA NATURAL OR ARTIFICIAL OPENING ENDOSCOPIC: ICD-10-PCS | Performed by: INTERNAL MEDICINE

## 2018-01-01 PROCEDURE — 25000125 ZZHC RX 250: Performed by: NURSE PRACTITIONER

## 2018-01-01 PROCEDURE — 85025 COMPLETE CBC W/AUTO DIFF WBC: CPT | Performed by: NURSE PRACTITIONER

## 2018-01-01 PROCEDURE — 40000986 XR ABDOMEN PORT 1 VW

## 2018-01-01 PROCEDURE — 96376 TX/PRO/DX INJ SAME DRUG ADON: CPT | Performed by: INTERNAL MEDICINE

## 2018-01-01 PROCEDURE — 40000141 ZZH STATISTIC PERIPHERAL IV START W/O US GUIDANCE

## 2018-01-01 PROCEDURE — 71000015 ZZH RECOVERY PHASE 1 LEVEL 2 EA ADDTL HR: Performed by: INTERNAL MEDICINE

## 2018-01-01 PROCEDURE — 99152 MOD SED SAME PHYS/QHP 5/>YRS: CPT

## 2018-01-01 PROCEDURE — 25000125 ZZHC RX 250: Performed by: NURSE ANESTHETIST, CERTIFIED REGISTERED

## 2018-01-01 PROCEDURE — 87181 SC STD AGAR DILUTION PER AGT: CPT | Performed by: PHYSICIAN ASSISTANT

## 2018-01-01 PROCEDURE — 97530 THERAPEUTIC ACTIVITIES: CPT | Mod: GP

## 2018-01-01 PROCEDURE — 80202 ASSAY OF VANCOMYCIN: CPT | Performed by: INTERNAL MEDICINE

## 2018-01-01 PROCEDURE — 80053 COMPREHEN METABOLIC PANEL: CPT | Performed by: EMERGENCY MEDICINE

## 2018-01-01 PROCEDURE — 40000264 ECHO COMPLETE WITH OPTISON

## 2018-01-01 PROCEDURE — 83735 ASSAY OF MAGNESIUM: CPT | Performed by: NURSE PRACTITIONER

## 2018-01-01 PROCEDURE — 83605 ASSAY OF LACTIC ACID: CPT | Performed by: PHYSICIAN ASSISTANT

## 2018-01-01 PROCEDURE — 82728 ASSAY OF FERRITIN: CPT | Performed by: INTERNAL MEDICINE

## 2018-01-01 PROCEDURE — 99214 OFFICE O/P EST MOD 30 MIN: CPT | Mod: ZP | Performed by: NURSE PRACTITIONER

## 2018-01-01 PROCEDURE — 36592 COLLECT BLOOD FROM PICC: CPT | Performed by: NURSE PRACTITIONER

## 2018-01-01 PROCEDURE — 40000986 XR CHEST 1 VW

## 2018-01-01 PROCEDURE — 96361 HYDRATE IV INFUSION ADD-ON: CPT | Performed by: INTERNAL MEDICINE

## 2018-01-01 PROCEDURE — 85049 AUTOMATED PLATELET COUNT: CPT | Performed by: INTERNAL MEDICINE

## 2018-01-01 PROCEDURE — 83605 ASSAY OF LACTIC ACID: CPT | Performed by: EMERGENCY MEDICINE

## 2018-01-01 PROCEDURE — 99285 EMERGENCY DEPT VISIT HI MDM: CPT | Mod: Z6 | Performed by: INTERNAL MEDICINE

## 2018-01-01 PROCEDURE — 74178 CT ABD&PLV WO CNTR FLWD CNTR: CPT

## 2018-01-01 PROCEDURE — 40000556 ZZH STATISTIC PERIPHERAL IV START W US GUIDANCE: Mod: ZF

## 2018-01-01 PROCEDURE — 87086 URINE CULTURE/COLONY COUNT: CPT | Performed by: NURSE PRACTITIONER

## 2018-01-01 PROCEDURE — 27210905 ZZH KIT CR7

## 2018-01-01 PROCEDURE — 99285 EMERGENCY DEPT VISIT HI MDM: CPT | Mod: 25 | Performed by: INTERNAL MEDICINE

## 2018-01-01 PROCEDURE — 36000051 ZZH SURGERY LEVEL 2 1ST 30 MIN - UMMC: Performed by: INTERNAL MEDICINE

## 2018-01-01 PROCEDURE — 25000128 H RX IP 250 OP 636: Mod: ZF | Performed by: NURSE PRACTITIONER

## 2018-01-01 PROCEDURE — 80202 ASSAY OF VANCOMYCIN: CPT | Performed by: PHYSICIAN ASSISTANT

## 2018-01-01 PROCEDURE — 84295 ASSAY OF SERUM SODIUM: CPT | Mod: 91 | Performed by: PHYSICIAN ASSISTANT

## 2018-01-01 PROCEDURE — 74019 RADEX ABDOMEN 2 VIEWS: CPT

## 2018-01-01 PROCEDURE — 85610 PROTHROMBIN TIME: CPT | Performed by: RADIOLOGY PRACTITIONER ASSISTANT

## 2018-01-01 PROCEDURE — P9016 RBC LEUKOCYTES REDUCED: HCPCS | Performed by: INTERNAL MEDICINE

## 2018-01-01 PROCEDURE — 87086 URINE CULTURE/COLONY COUNT: CPT | Performed by: INTERNAL MEDICINE

## 2018-01-01 PROCEDURE — 99215 OFFICE O/P EST HI 40 MIN: CPT | Performed by: INTERNAL MEDICINE

## 2018-01-01 PROCEDURE — 96368 THER/DIAG CONCURRENT INF: CPT | Performed by: EMERGENCY MEDICINE

## 2018-01-01 PROCEDURE — 84300 ASSAY OF URINE SODIUM: CPT | Performed by: NURSE PRACTITIONER

## 2018-01-01 PROCEDURE — 93306 TTE W/DOPPLER COMPLETE: CPT | Mod: 26 | Performed by: INTERNAL MEDICINE

## 2018-01-01 PROCEDURE — 99239 HOSP IP/OBS DSCHRG MGMT >30: CPT | Performed by: INTERNAL MEDICINE

## 2018-01-01 PROCEDURE — 99221 1ST HOSP IP/OBS SF/LOW 40: CPT | Mod: GC | Performed by: SURGERY

## 2018-01-01 PROCEDURE — 83930 ASSAY OF BLOOD OSMOLALITY: CPT | Performed by: PHYSICIAN ASSISTANT

## 2018-01-01 PROCEDURE — 96374 THER/PROPH/DIAG INJ IV PUSH: CPT | Performed by: EMERGENCY MEDICINE

## 2018-01-01 PROCEDURE — 74283 THER NMA RDCTJ INTUS/OBSTRCJ: CPT

## 2018-01-01 PROCEDURE — 87070 CULTURE OTHR SPECIMN AEROBIC: CPT | Performed by: PHYSICIAN ASSISTANT

## 2018-01-01 PROCEDURE — 99358 PROLONG SERVICE W/O CONTACT: CPT | Performed by: INTERNAL MEDICINE

## 2018-01-01 PROCEDURE — 87631 RESP VIRUS 3-5 TARGETS: CPT | Performed by: EMERGENCY MEDICINE

## 2018-01-01 PROCEDURE — 96376 TX/PRO/DX INJ SAME DRUG ADON: CPT | Performed by: EMERGENCY MEDICINE

## 2018-01-01 PROCEDURE — 27210916 ZZ H TUBE GASTRO CR5

## 2018-01-01 PROCEDURE — 97110 THERAPEUTIC EXERCISES: CPT | Mod: GP

## 2018-01-01 PROCEDURE — 25800025 ZZH RX 258: Performed by: INTERNAL MEDICINE

## 2018-01-01 PROCEDURE — 71046 X-RAY EXAM CHEST 2 VIEWS: CPT

## 2018-01-01 PROCEDURE — 27210735 ZZH ACCESSORY CR12

## 2018-01-01 PROCEDURE — 83540 ASSAY OF IRON: CPT | Performed by: INTERNAL MEDICINE

## 2018-01-01 PROCEDURE — 40000277 XR SURGERY CARM FLUORO LESS THAN 5 MIN W STILLS: Mod: TC

## 2018-01-01 PROCEDURE — 40000268 ZZH STATISTIC NO CHARGES: Mod: ZF

## 2018-01-01 PROCEDURE — 99214 OFFICE O/P EST MOD 30 MIN: CPT | Mod: GC | Performed by: INTERNAL MEDICINE

## 2018-01-01 PROCEDURE — 93005 ELECTROCARDIOGRAM TRACING: CPT

## 2018-01-01 PROCEDURE — 40000279 XR SURGERY CARM FLUORO GREATER THAN 5 MIN W STILLS: Mod: TC

## 2018-01-01 PROCEDURE — 25000132 ZZH RX MED GY IP 250 OP 250 PS 637: Performed by: FAMILY MEDICINE

## 2018-01-01 PROCEDURE — 81001 URINALYSIS AUTO W/SCOPE: CPT | Performed by: NURSE PRACTITIONER

## 2018-01-01 PROCEDURE — 84100 ASSAY OF PHOSPHORUS: CPT | Performed by: NURSE PRACTITIONER

## 2018-01-01 PROCEDURE — 40000114 ZZH STATISTIC NO CHARGE CLINIC VISIT

## 2018-01-01 PROCEDURE — 99222 1ST HOSP IP/OBS MODERATE 55: CPT | Mod: AI | Performed by: INTERNAL MEDICINE

## 2018-01-01 PROCEDURE — 27210732 ZZH ACCESSORY CR1

## 2018-01-01 PROCEDURE — 40000893 ZZH STATISTIC PT IP EVAL DEFER

## 2018-01-01 PROCEDURE — 87493 C DIFF AMPLIFIED PROBE: CPT | Performed by: NURSE PRACTITIONER

## 2018-01-01 PROCEDURE — 82248 BILIRUBIN DIRECT: CPT | Performed by: INTERNAL MEDICINE

## 2018-01-01 PROCEDURE — 85027 COMPLETE CBC AUTOMATED: CPT | Performed by: RADIOLOGY PRACTITIONER ASSISTANT

## 2018-01-01 PROCEDURE — 87077 CULTURE AEROBIC IDENTIFY: CPT | Performed by: PHYSICIAN ASSISTANT

## 2018-01-01 PROCEDURE — 84238 ASSAY NONENDOCRINE RECEPTOR: CPT | Performed by: PHYSICIAN ASSISTANT

## 2018-01-01 PROCEDURE — 96374 THER/PROPH/DIAG INJ IV PUSH: CPT

## 2018-01-01 PROCEDURE — 99607 MTMS BY PHARM ADDL 15 MIN: CPT | Mod: U4 | Performed by: PHARMACIST

## 2018-01-01 PROCEDURE — 87493 C DIFF AMPLIFIED PROBE: CPT | Performed by: PHYSICIAN ASSISTANT

## 2018-01-01 PROCEDURE — 82565 ASSAY OF CREATININE: CPT | Mod: 91 | Performed by: PHYSICIAN ASSISTANT

## 2018-01-01 PROCEDURE — C1769 GUIDE WIRE: HCPCS

## 2018-01-01 PROCEDURE — 87186 SC STD MICRODIL/AGAR DIL: CPT | Performed by: PHYSICIAN ASSISTANT

## 2018-01-01 PROCEDURE — 87186 SC STD MICRODIL/AGAR DIL: CPT | Performed by: INTERNAL MEDICINE

## 2018-01-01 PROCEDURE — 86923 COMPATIBILITY TEST ELECTRIC: CPT | Performed by: EMERGENCY MEDICINE

## 2018-01-01 PROCEDURE — 82728 ASSAY OF FERRITIN: CPT | Performed by: PHYSICIAN ASSISTANT

## 2018-01-01 PROCEDURE — 96411 CHEMO IV PUSH ADDL DRUG: CPT

## 2018-01-01 PROCEDURE — 0DCN8ZZ EXTIRPATION OF MATTER FROM SIGMOID COLON, VIA NATURAL OR ARTIFICIAL OPENING ENDOSCOPIC: ICD-10-PCS | Performed by: INTERNAL MEDICINE

## 2018-01-01 PROCEDURE — 87040 BLOOD CULTURE FOR BACTERIA: CPT | Mod: 91 | Performed by: NURSE PRACTITIONER

## 2018-01-01 PROCEDURE — 83605 ASSAY OF LACTIC ACID: CPT | Performed by: SURGERY

## 2018-01-01 PROCEDURE — 0DH63UZ INSERTION OF FEEDING DEVICE INTO STOMACH, PERCUTANEOUS APPROACH: ICD-10-PCS | Performed by: RADIOLOGY

## 2018-01-01 PROCEDURE — 36592 COLLECT BLOOD FROM PICC: CPT | Performed by: RADIOLOGY PRACTITIONER ASSISTANT

## 2018-01-01 PROCEDURE — 83550 IRON BINDING TEST: CPT | Performed by: INTERNAL MEDICINE

## 2018-01-01 PROCEDURE — 87493 C DIFF AMPLIFIED PROBE: CPT | Performed by: STUDENT IN AN ORGANIZED HEALTH CARE EDUCATION/TRAINING PROGRAM

## 2018-01-01 PROCEDURE — 0JPT3WZ REMOVAL OF TOTALLY IMPLANTABLE VASCULAR ACCESS DEVICE FROM TRUNK SUBCUTANEOUS TISSUE AND FASCIA, PERCUTANEOUS APPROACH: ICD-10-PCS | Performed by: PHYSICIAN ASSISTANT

## 2018-01-01 PROCEDURE — 25000125 ZZHC RX 250: Performed by: RADIOLOGY

## 2018-01-01 PROCEDURE — 96375 TX/PRO/DX INJ NEW DRUG ADDON: CPT | Mod: 59

## 2018-01-01 PROCEDURE — 83550 IRON BINDING TEST: CPT | Performed by: PHYSICIAN ASSISTANT

## 2018-01-01 PROCEDURE — 36415 COLL VENOUS BLD VENIPUNCTURE: CPT

## 2018-01-01 PROCEDURE — 36593 DECLOT VASCULAR DEVICE: CPT

## 2018-01-01 PROCEDURE — 84295 ASSAY OF SERUM SODIUM: CPT | Performed by: INTERNAL MEDICINE

## 2018-01-01 PROCEDURE — 87800 DETECT AGNT MULT DNA DIREC: CPT | Performed by: EMERGENCY MEDICINE

## 2018-01-01 PROCEDURE — 82565 ASSAY OF CREATININE: CPT

## 2018-01-01 PROCEDURE — 99605 MTMS BY PHARM NP 15 MIN: CPT | Mod: U4 | Performed by: PHARMACIST

## 2018-01-01 PROCEDURE — 84300 ASSAY OF URINE SODIUM: CPT | Performed by: INTERNAL MEDICINE

## 2018-01-01 PROCEDURE — 86850 RBC ANTIBODY SCREEN: CPT | Performed by: EMERGENCY MEDICINE

## 2018-01-01 PROCEDURE — 82728 ASSAY OF FERRITIN: CPT | Performed by: NURSE PRACTITIONER

## 2018-01-01 PROCEDURE — 99606 MTMS BY PHARM EST 15 MIN: CPT | Performed by: PHARMACIST

## 2018-01-01 PROCEDURE — 27210738 ZZH ACCESSORY CR2

## 2018-01-01 PROCEDURE — 83550 IRON BINDING TEST: CPT | Performed by: NURSE PRACTITIONER

## 2018-01-01 PROCEDURE — 86901 BLOOD TYPING SEROLOGIC RH(D): CPT | Performed by: EMERGENCY MEDICINE

## 2018-01-01 PROCEDURE — 87205 SMEAR GRAM STAIN: CPT | Performed by: INTERNAL MEDICINE

## 2018-01-01 PROCEDURE — P9016 RBC LEUKOCYTES REDUCED: HCPCS | Performed by: EMERGENCY MEDICINE

## 2018-01-01 PROCEDURE — 87040 BLOOD CULTURE FOR BACTERIA: CPT | Performed by: NURSE PRACTITIONER

## 2018-01-01 PROCEDURE — 83615 LACTATE (LD) (LDH) ENZYME: CPT | Performed by: PHYSICIAN ASSISTANT

## 2018-01-01 PROCEDURE — 85610 PROTHROMBIN TIME: CPT | Performed by: NURSE PRACTITIONER

## 2018-01-01 PROCEDURE — 86900 BLOOD TYPING SEROLOGIC ABO: CPT | Performed by: EMERGENCY MEDICINE

## 2018-01-01 DEVICE — IMPLANTABLE DEVICE: Type: IMPLANTABLE DEVICE | Site: DESCENDING COLON | Status: FUNCTIONAL

## 2018-01-01 DEVICE — STENT DUODENAL EVOLUTION 10CM25X30MM EVO-25-30-10-C: Type: IMPLANTABLE DEVICE | Status: FUNCTIONAL

## 2018-01-01 DEVICE — STENT DUODENAL EVOLUTION 10CM25X30MM EVO-25-30-10-C: Type: IMPLANTABLE DEVICE | Site: DESCENDING COLON | Status: FUNCTIONAL

## 2018-01-01 RX ORDER — SIMETHICONE 80 MG
80 TABLET,CHEWABLE ORAL EVERY 6 HOURS PRN
Status: DISCONTINUED | OUTPATIENT
Start: 2018-01-01 | End: 2018-01-01 | Stop reason: HOSPADM

## 2018-01-01 RX ORDER — HEPARIN SODIUM,PORCINE 10 UNIT/ML
5 VIAL (ML) INTRAVENOUS DAILY
Status: DISCONTINUED | OUTPATIENT
Start: 2018-01-01 | End: 2018-01-01 | Stop reason: HOSPADM

## 2018-01-01 RX ORDER — POTASSIUM CHLORIDE 3 G/15ML
20 SOLUTION ORAL DAILY
Qty: 52.5 ML | Refills: 0 | COMMUNITY
Start: 2018-01-01 | End: 2018-01-01

## 2018-01-01 RX ORDER — ONDANSETRON 2 MG/ML
8 INJECTION INTRAMUSCULAR; INTRAVENOUS EVERY 6 HOURS PRN
Status: CANCELLED
Start: 2018-01-01 | End: 2019-07-24

## 2018-01-01 RX ORDER — LORAZEPAM 0.5 MG/1
.5-1 TABLET ORAL EVERY 6 HOURS PRN
Status: DISCONTINUED | OUTPATIENT
Start: 2018-01-01 | End: 2018-01-01 | Stop reason: HOSPADM

## 2018-01-01 RX ORDER — ONDANSETRON 2 MG/ML
8 INJECTION INTRAMUSCULAR; INTRAVENOUS ONCE
Status: CANCELLED | OUTPATIENT
Start: 2018-01-01 | End: 2018-01-01

## 2018-01-01 RX ORDER — ALBUTEROL SULFATE 90 UG/1
1-2 AEROSOL, METERED RESPIRATORY (INHALATION)
Status: CANCELLED
Start: 2018-01-01

## 2018-01-01 RX ORDER — LIDOCAINE 40 MG/G
CREAM TOPICAL
Status: DISCONTINUED | OUTPATIENT
Start: 2018-01-01 | End: 2018-01-01 | Stop reason: HOSPADM

## 2018-01-01 RX ORDER — ALBUTEROL SULFATE 0.83 MG/ML
2.5 SOLUTION RESPIRATORY (INHALATION)
Status: CANCELLED | OUTPATIENT
Start: 2018-01-01

## 2018-01-01 RX ORDER — DIPHENHYDRAMINE HYDROCHLORIDE 50 MG/ML
50 INJECTION INTRAMUSCULAR; INTRAVENOUS
Status: CANCELLED
Start: 2018-01-01

## 2018-01-01 RX ORDER — ONDANSETRON 2 MG/ML
8 INJECTION INTRAMUSCULAR; INTRAVENOUS EVERY 6 HOURS PRN
Status: CANCELLED
Start: 2018-01-01

## 2018-01-01 RX ORDER — POTASSIUM CHLORIDE 7.45 MG/ML
10 INJECTION INTRAVENOUS
Status: DISCONTINUED | OUTPATIENT
Start: 2018-01-01 | End: 2018-01-01 | Stop reason: HOSPADM

## 2018-01-01 RX ORDER — PROCHLORPERAZINE MALEATE 10 MG
10 TABLET ORAL EVERY 6 HOURS PRN
Status: DISCONTINUED | OUTPATIENT
Start: 2018-01-01 | End: 2018-01-01 | Stop reason: HOSPADM

## 2018-01-01 RX ORDER — CEFTRIAXONE 1 G/1
1 INJECTION, POWDER, FOR SOLUTION INTRAMUSCULAR; INTRAVENOUS EVERY 24 HOURS
Qty: 110 ML | Refills: 0
Start: 2018-01-01 | End: 2018-01-01

## 2018-01-01 RX ORDER — EPINEPHRINE 0.3 MG/.3ML
0.3 INJECTION SUBCUTANEOUS EVERY 5 MIN PRN
Status: CANCELLED | OUTPATIENT
Start: 2018-01-01

## 2018-01-01 RX ORDER — MEPERIDINE HYDROCHLORIDE 25 MG/ML
25 INJECTION INTRAMUSCULAR; INTRAVENOUS; SUBCUTANEOUS EVERY 30 MIN PRN
Status: CANCELLED | OUTPATIENT
Start: 2018-01-01

## 2018-01-01 RX ORDER — SODIUM CHLORIDE 9 MG/ML
INJECTION, SOLUTION INTRAVENOUS CONTINUOUS
Status: DISCONTINUED | OUTPATIENT
Start: 2018-01-01 | End: 2018-01-01 | Stop reason: HOSPADM

## 2018-01-01 RX ORDER — IOPAMIDOL 408 MG/ML
INJECTION, SOLUTION INTRAVASCULAR PRN
Status: DISCONTINUED | OUTPATIENT
Start: 2018-01-01 | End: 2018-01-01 | Stop reason: HOSPADM

## 2018-01-01 RX ORDER — FLUMAZENIL 0.1 MG/ML
0.2 INJECTION, SOLUTION INTRAVENOUS
Status: ACTIVE | OUTPATIENT
Start: 2018-01-01 | End: 2018-01-01

## 2018-01-01 RX ORDER — FUROSEMIDE 10 MG/ML
20 INJECTION INTRAMUSCULAR; INTRAVENOUS ONCE
Status: COMPLETED | OUTPATIENT
Start: 2018-01-01 | End: 2018-01-01

## 2018-01-01 RX ORDER — ONDANSETRON 2 MG/ML
8 INJECTION INTRAMUSCULAR; INTRAVENOUS ONCE
Status: CANCELLED
Start: 2018-01-01 | End: 2018-01-01

## 2018-01-01 RX ORDER — PIPERACILLIN SODIUM, TAZOBACTAM SODIUM 3; .375 G/15ML; G/15ML
3.38 INJECTION, POWDER, LYOPHILIZED, FOR SOLUTION INTRAVENOUS ONCE
Status: COMPLETED | OUTPATIENT
Start: 2018-01-01 | End: 2018-01-01

## 2018-01-01 RX ORDER — FENTANYL 100 UG/1
4 PATCH TRANSDERMAL
Qty: 40 PATCH | Refills: 0 | Status: SHIPPED | OUTPATIENT
Start: 2018-01-01 | End: 2018-01-01 | Stop reason: ALTCHOICE

## 2018-01-01 RX ORDER — METHADONE HYDROCHLORIDE 5 MG/1
7.5 TABLET ORAL EVERY 12 HOURS
Qty: 20 TABLET | Refills: 0 | Status: SHIPPED | OUTPATIENT
Start: 2018-01-01

## 2018-01-01 RX ORDER — LORAZEPAM 0.5 MG/1
0.5 TABLET ORAL EVERY 4 HOURS PRN
Status: DISCONTINUED | OUTPATIENT
Start: 2018-01-01 | End: 2018-01-01 | Stop reason: HOSPADM

## 2018-01-01 RX ORDER — POTASSIUM CHLORIDE 29.8 MG/ML
20 INJECTION INTRAVENOUS
Status: DISCONTINUED | OUTPATIENT
Start: 2018-01-01 | End: 2018-01-01 | Stop reason: HOSPADM

## 2018-01-01 RX ORDER — PROCHLORPERAZINE MALEATE 5 MG
5-10 TABLET ORAL EVERY 6 HOURS PRN
Status: DISCONTINUED | OUTPATIENT
Start: 2018-01-01 | End: 2018-01-01 | Stop reason: HOSPADM

## 2018-01-01 RX ORDER — HYDROMORPHONE HYDROCHLORIDE 10 MG/ML
INJECTION INTRAMUSCULAR; INTRAVENOUS; SUBCUTANEOUS
Qty: 25 ML | Refills: 0 | Status: SHIPPED | OUTPATIENT
Start: 2018-01-01 | End: 2018-01-01

## 2018-01-01 RX ORDER — DRONABINOL 5 MG/1
5 CAPSULE ORAL
Status: DISCONTINUED | OUTPATIENT
Start: 2018-01-01 | End: 2018-01-01 | Stop reason: HOSPADM

## 2018-01-01 RX ORDER — HYDROMORPHONE HYDROCHLORIDE 10 MG/ML
INJECTION INTRAMUSCULAR; INTRAVENOUS; SUBCUTANEOUS
Qty: 30 ML | Refills: 0 | Status: ON HOLD | OUTPATIENT
Start: 2018-01-01 | End: 2018-01-01

## 2018-01-01 RX ORDER — SODIUM CHLORIDE, SODIUM LACTATE, POTASSIUM CHLORIDE, CALCIUM CHLORIDE 600; 310; 30; 20 MG/100ML; MG/100ML; MG/100ML; MG/100ML
INJECTION, SOLUTION INTRAVENOUS CONTINUOUS
Status: DISCONTINUED | OUTPATIENT
Start: 2018-01-01 | End: 2018-01-01

## 2018-01-01 RX ORDER — OXYCODONE HYDROCHLORIDE 5 MG/1
5-10 CAPSULE ORAL EVERY 4 HOURS PRN
Qty: 100 CAPSULE | Refills: 0 | Status: SHIPPED | OUTPATIENT
Start: 2018-01-01 | End: 2018-01-01

## 2018-01-01 RX ORDER — ONDANSETRON 8 MG/1
8 TABLET, ORALLY DISINTEGRATING ORAL EVERY 8 HOURS PRN
Qty: 30 TABLET | Refills: 1 | Status: ON HOLD | OUTPATIENT
Start: 2018-01-01 | End: 2018-01-01

## 2018-01-01 RX ORDER — SODIUM CHLORIDE 9 MG/ML
1000 INJECTION, SOLUTION INTRAVENOUS CONTINUOUS PRN
Status: CANCELLED
Start: 2018-01-01

## 2018-01-01 RX ORDER — HYDROMORPHONE HYDROCHLORIDE 1 MG/ML
1-2 INJECTION, SOLUTION INTRAMUSCULAR; INTRAVENOUS; SUBCUTANEOUS
Status: DISCONTINUED | OUTPATIENT
Start: 2018-01-01 | End: 2018-01-01 | Stop reason: HOSPADM

## 2018-01-01 RX ORDER — EPINEPHRINE 1 MG/ML
0.3 INJECTION, SOLUTION INTRAMUSCULAR; SUBCUTANEOUS EVERY 5 MIN PRN
Status: CANCELLED | OUTPATIENT
Start: 2018-01-01

## 2018-01-01 RX ORDER — HYDROMORPHONE HYDROCHLORIDE 1 MG/ML
INJECTION, SOLUTION INTRAMUSCULAR; INTRAVENOUS; SUBCUTANEOUS
Qty: 250 ML | Refills: 0 | Status: CANCELLED | OUTPATIENT
Start: 2018-01-01

## 2018-01-01 RX ORDER — ONDANSETRON 2 MG/ML
8 INJECTION INTRAMUSCULAR; INTRAVENOUS EVERY 8 HOURS PRN
Status: DISCONTINUED | OUTPATIENT
Start: 2018-01-01 | End: 2018-01-01 | Stop reason: HOSPADM

## 2018-01-01 RX ORDER — ONDANSETRON 2 MG/ML
4 INJECTION INTRAMUSCULAR; INTRAVENOUS ONCE
Status: COMPLETED | OUTPATIENT
Start: 2018-01-01 | End: 2018-01-01

## 2018-01-01 RX ORDER — FLUOROURACIL 50 MG/ML
400 INJECTION, SOLUTION INTRAVENOUS ONCE
Status: CANCELLED | OUTPATIENT
Start: 2018-01-01

## 2018-01-01 RX ORDER — OXYCODONE HYDROCHLORIDE 5 MG/1
5-10 TABLET ORAL EVERY 4 HOURS PRN
Status: DISCONTINUED | OUTPATIENT
Start: 2018-01-01 | End: 2018-01-01 | Stop reason: HOSPADM

## 2018-01-01 RX ORDER — DRONABINOL 2.5 MG/1
CAPSULE ORAL
Qty: 60 CAPSULE | Refills: 3 | Status: SHIPPED | OUTPATIENT
Start: 2018-01-01 | End: 2018-01-01

## 2018-01-01 RX ORDER — METHYLPREDNISOLONE SODIUM SUCCINATE 125 MG/2ML
125 INJECTION, POWDER, LYOPHILIZED, FOR SOLUTION INTRAMUSCULAR; INTRAVENOUS
Status: CANCELLED
Start: 2018-01-01

## 2018-01-01 RX ORDER — MAGNESIUM OXIDE 400 MG/1
400 TABLET ORAL 2 TIMES DAILY
Status: DISCONTINUED | OUTPATIENT
Start: 2018-01-01 | End: 2018-01-01

## 2018-01-01 RX ORDER — HEPARIN SODIUM 5000 [USP'U]/.5ML
5000 INJECTION, SOLUTION INTRAVENOUS; SUBCUTANEOUS EVERY 8 HOURS
Status: DISCONTINUED | OUTPATIENT
Start: 2018-01-01 | End: 2018-01-01

## 2018-01-01 RX ORDER — PANTOPRAZOLE SODIUM 40 MG/1
40 TABLET, DELAYED RELEASE ORAL
Qty: 30 TABLET | Refills: 0 | Status: ON HOLD | OUTPATIENT
Start: 2018-01-01 | End: 2018-01-01

## 2018-01-01 RX ORDER — PIPERACILLIN SODIUM, TAZOBACTAM SODIUM 3; .375 G/15ML; G/15ML
3.38 INJECTION, POWDER, LYOPHILIZED, FOR SOLUTION INTRAVENOUS EVERY 6 HOURS
Status: DISCONTINUED | OUTPATIENT
Start: 2018-01-01 | End: 2018-01-01

## 2018-01-01 RX ORDER — OXYCODONE HYDROCHLORIDE 10 MG/1
10-20 TABLET ORAL EVERY 4 HOURS PRN
Status: DISCONTINUED | OUTPATIENT
Start: 2018-01-01 | End: 2018-01-01 | Stop reason: HOSPADM

## 2018-01-01 RX ORDER — LORAZEPAM 2 MG/ML
0.5 INJECTION INTRAMUSCULAR EVERY 4 HOURS PRN
Status: CANCELLED
Start: 2018-01-01

## 2018-01-01 RX ORDER — POTASSIUM CL/LIDO/0.9 % NACL 10MEQ/0.1L
10 INTRAVENOUS SOLUTION, PIGGYBACK (ML) INTRAVENOUS
Status: DISCONTINUED | OUTPATIENT
Start: 2018-01-01 | End: 2018-01-01 | Stop reason: HOSPADM

## 2018-01-01 RX ORDER — ONDANSETRON 8 MG/1
8 TABLET, FILM COATED ORAL EVERY 8 HOURS PRN
Status: DISCONTINUED | OUTPATIENT
Start: 2018-01-01 | End: 2018-01-01

## 2018-01-01 RX ORDER — EPINEPHRINE 1 MG/ML
0.3 INJECTION, SOLUTION, CONCENTRATE INTRAVENOUS EVERY 5 MIN PRN
Status: CANCELLED | OUTPATIENT
Start: 2018-01-01

## 2018-01-01 RX ORDER — OLANZAPINE 2.5 MG/1
5 TABLET, FILM COATED ORAL AT BEDTIME
Status: DISCONTINUED | OUTPATIENT
Start: 2018-01-01 | End: 2018-01-01

## 2018-01-01 RX ORDER — FENTANYL 100 UG/1
100 PATCH TRANSDERMAL
Status: DISCONTINUED | OUTPATIENT
Start: 2018-01-01 | End: 2018-01-01

## 2018-01-01 RX ORDER — SODIUM CHLORIDE, SODIUM LACTATE, POTASSIUM CHLORIDE, CALCIUM CHLORIDE 600; 310; 30; 20 MG/100ML; MG/100ML; MG/100ML; MG/100ML
INJECTION, SOLUTION INTRAVENOUS ONCE
Status: COMPLETED | OUTPATIENT
Start: 2018-01-01 | End: 2018-01-01

## 2018-01-01 RX ORDER — HEPARIN SODIUM,PORCINE 10 UNIT/ML
5 VIAL (ML) INTRAVENOUS
Status: DISCONTINUED | OUTPATIENT
Start: 2018-01-01 | End: 2018-01-01 | Stop reason: HOSPADM

## 2018-01-01 RX ORDER — OLANZAPINE 2.5 MG/1
5 TABLET, FILM COATED ORAL 2 TIMES DAILY
Status: DISCONTINUED | OUTPATIENT
Start: 2018-01-01 | End: 2018-01-01 | Stop reason: HOSPADM

## 2018-01-01 RX ORDER — IOPAMIDOL 755 MG/ML
93 INJECTION, SOLUTION INTRAVASCULAR ONCE
Status: COMPLETED | OUTPATIENT
Start: 2018-01-01 | End: 2018-01-01

## 2018-01-01 RX ORDER — FENTANYL CITRATE 50 UG/ML
25-50 INJECTION, SOLUTION INTRAMUSCULAR; INTRAVENOUS EVERY 5 MIN PRN
Status: DISCONTINUED | OUTPATIENT
Start: 2018-01-01 | End: 2018-01-01 | Stop reason: HOSPADM

## 2018-01-01 RX ORDER — FENTANYL 75 UG/1
75 PATCH TRANSDERMAL
Status: DISCONTINUED | OUTPATIENT
Start: 2018-01-01 | End: 2018-01-01

## 2018-01-01 RX ORDER — HEPARIN SODIUM,PORCINE 10 UNIT/ML
3 VIAL (ML) INTRAVENOUS
Status: DISCONTINUED | OUTPATIENT
Start: 2018-01-01 | End: 2018-01-01 | Stop reason: HOSPADM

## 2018-01-01 RX ORDER — ONDANSETRON 8 MG/1
8 TABLET, FILM COATED ORAL EVERY 8 HOURS
Status: DISCONTINUED | OUTPATIENT
Start: 2018-01-01 | End: 2018-01-01 | Stop reason: HOSPADM

## 2018-01-01 RX ORDER — POTASSIUM CHLORIDE 750 MG/1
20-40 TABLET, EXTENDED RELEASE ORAL
Status: DISCONTINUED | OUTPATIENT
Start: 2018-01-01 | End: 2018-01-01 | Stop reason: HOSPADM

## 2018-01-01 RX ORDER — SODIUM CHLORIDE, SODIUM LACTATE, POTASSIUM CHLORIDE, CALCIUM CHLORIDE 600; 310; 30; 20 MG/100ML; MG/100ML; MG/100ML; MG/100ML
INJECTION, SOLUTION INTRAVENOUS CONTINUOUS
Status: DISCONTINUED | OUTPATIENT
Start: 2018-01-01 | End: 2018-01-01 | Stop reason: HOSPADM

## 2018-01-01 RX ORDER — HYDROMORPHONE HYDROCHLORIDE 1 MG/ML
INJECTION, SOLUTION INTRAMUSCULAR; INTRAVENOUS; SUBCUTANEOUS
Status: COMPLETED
Start: 2018-01-01 | End: 2018-01-01

## 2018-01-01 RX ORDER — ONDANSETRON 8 MG/1
8 TABLET, ORALLY DISINTEGRATING ORAL EVERY 8 HOURS
Status: DISCONTINUED | OUTPATIENT
Start: 2018-01-01 | End: 2018-01-01 | Stop reason: HOSPADM

## 2018-01-01 RX ORDER — SODIUM CHLORIDE, SODIUM LACTATE, POTASSIUM CHLORIDE, CALCIUM CHLORIDE 600; 310; 30; 20 MG/100ML; MG/100ML; MG/100ML; MG/100ML
INJECTION, SOLUTION INTRAVENOUS CONTINUOUS PRN
Status: DISCONTINUED | OUTPATIENT
Start: 2018-01-01 | End: 2018-01-01

## 2018-01-01 RX ORDER — SODIUM CHLORIDE 9 MG/ML
1000 INJECTION, SOLUTION INTRAVENOUS CONTINUOUS
Status: DISCONTINUED | OUTPATIENT
Start: 2018-01-01 | End: 2018-01-01

## 2018-01-01 RX ORDER — FENTANYL 25 UG/1
25 PATCH TRANSDERMAL
Status: DISCONTINUED | OUTPATIENT
Start: 2018-01-01 | End: 2018-01-01

## 2018-01-01 RX ORDER — HYDROMORPHONE HYDROCHLORIDE 1 MG/ML
.3-.5 INJECTION, SOLUTION INTRAMUSCULAR; INTRAVENOUS; SUBCUTANEOUS
Status: CANCELLED
Start: 2018-01-01

## 2018-01-01 RX ORDER — LORAZEPAM 0.5 MG/1
.5-1 TABLET ORAL EVERY 4 HOURS PRN
Qty: 30 TABLET | Refills: 0 | Status: SHIPPED | OUTPATIENT
Start: 2018-01-01

## 2018-01-01 RX ORDER — PANTOPRAZOLE SODIUM 40 MG/1
40 TABLET, DELAYED RELEASE ORAL
Status: DISCONTINUED | OUTPATIENT
Start: 2018-01-01 | End: 2018-01-01 | Stop reason: HOSPADM

## 2018-01-01 RX ORDER — LORAZEPAM 2 MG/ML
.5-1 INJECTION INTRAMUSCULAR EVERY 6 HOURS PRN
Status: DISCONTINUED | OUTPATIENT
Start: 2018-01-01 | End: 2018-01-01 | Stop reason: HOSPADM

## 2018-01-01 RX ORDER — DEXAMETHASONE 4 MG/1
4 TABLET ORAL DAILY
Status: DISCONTINUED | OUTPATIENT
Start: 2018-01-01 | End: 2018-01-01 | Stop reason: HOSPADM

## 2018-01-01 RX ORDER — OXYCODONE HCL 20 MG/1
20 TABLET, FILM COATED, EXTENDED RELEASE ORAL EVERY 12 HOURS
Status: DISCONTINUED | OUTPATIENT
Start: 2018-01-01 | End: 2018-01-01 | Stop reason: HOSPADM

## 2018-01-01 RX ORDER — IODIXANOL 320 MG/ML
50 INJECTION, SOLUTION INTRAVASCULAR ONCE
Status: COMPLETED | OUTPATIENT
Start: 2018-01-01 | End: 2018-01-01

## 2018-01-01 RX ORDER — FENTANYL CITRATE 50 UG/ML
INJECTION, SOLUTION INTRAMUSCULAR; INTRAVENOUS PRN
Status: DISCONTINUED | OUTPATIENT
Start: 2018-01-01 | End: 2018-01-01

## 2018-01-01 RX ORDER — ONDANSETRON 8 MG/1
8 TABLET, FILM COATED ORAL EVERY 8 HOURS PRN
Status: DISCONTINUED | OUTPATIENT
Start: 2018-01-01 | End: 2018-01-01 | Stop reason: HOSPADM

## 2018-01-01 RX ORDER — NALOXONE HYDROCHLORIDE 0.4 MG/ML
.1-.4 INJECTION, SOLUTION INTRAMUSCULAR; INTRAVENOUS; SUBCUTANEOUS
Status: DISCONTINUED | OUTPATIENT
Start: 2018-01-01 | End: 2018-01-01 | Stop reason: HOSPADM

## 2018-01-01 RX ORDER — SODIUM CHLORIDE 9 MG/ML
INJECTION, SOLUTION INTRAVENOUS CONTINUOUS PRN
Status: DISCONTINUED | OUTPATIENT
Start: 2018-01-01 | End: 2018-01-01

## 2018-01-01 RX ORDER — FLUOROURACIL 50 MG/ML
400 INJECTION, SOLUTION INTRAVENOUS ONCE
Status: COMPLETED | OUTPATIENT
Start: 2018-01-01 | End: 2018-01-01

## 2018-01-01 RX ORDER — ONDANSETRON 2 MG/ML
8 INJECTION INTRAMUSCULAR; INTRAVENOUS ONCE
Status: COMPLETED | OUTPATIENT
Start: 2018-01-01 | End: 2018-01-01

## 2018-01-01 RX ORDER — LOPERAMIDE HCL 2 MG
2 CAPSULE ORAL PRN
Status: ON HOLD | COMMUNITY
End: 2018-01-01

## 2018-01-01 RX ORDER — HYDROMORPHONE HYDROCHLORIDE 1 MG/ML
0.5 INJECTION, SOLUTION INTRAMUSCULAR; INTRAVENOUS; SUBCUTANEOUS
Status: COMPLETED | OUTPATIENT
Start: 2018-01-01 | End: 2018-01-01

## 2018-01-01 RX ORDER — HYDROMORPHONE HYDROCHLORIDE 1 MG/ML
.3-.5 SOLUTION ORAL
Status: CANCELLED
Start: 2018-01-01

## 2018-01-01 RX ORDER — ALUMINA, MAGNESIA, AND SIMETHICONE 2400; 2400; 240 MG/30ML; MG/30ML; MG/30ML
30 SUSPENSION ORAL EVERY 4 HOURS PRN
Status: DISCONTINUED | OUTPATIENT
Start: 2018-01-01 | End: 2018-01-01 | Stop reason: HOSPADM

## 2018-01-01 RX ORDER — DOXYCYCLINE 100 MG/1
CAPSULE ORAL
COMMUNITY
Start: 2018-01-01 | End: 2018-01-01

## 2018-01-01 RX ORDER — SODIUM CHLORIDE AND POTASSIUM CHLORIDE 150; 900 MG/100ML; MG/100ML
INJECTION, SOLUTION INTRAVENOUS CONTINUOUS
Status: DISCONTINUED | OUTPATIENT
Start: 2018-01-01 | End: 2018-01-01

## 2018-01-01 RX ORDER — FENTANYL 25 UG/1
25 PATCH TRANSDERMAL
Status: DISCONTINUED | OUTPATIENT
Start: 2018-01-01 | End: 2018-01-01 | Stop reason: HOSPADM

## 2018-01-01 RX ORDER — LORAZEPAM 0.5 MG/1
0.5 TABLET ORAL EVERY 4 HOURS PRN
Qty: 30 TABLET | Refills: 5 | Status: SHIPPED | OUTPATIENT
Start: 2018-01-01 | End: 2018-01-01

## 2018-01-01 RX ORDER — METHADONE HYDROCHLORIDE 5 MG/1
5 TABLET ORAL EVERY 12 HOURS SCHEDULED
Status: DISCONTINUED | OUTPATIENT
Start: 2018-01-01 | End: 2018-01-01

## 2018-01-01 RX ORDER — DEXAMETHASONE 4 MG/1
4 TABLET ORAL DAILY
Qty: 20 TABLET | Refills: 0 | Status: SHIPPED | OUTPATIENT
Start: 2018-01-01 | End: 2018-01-01

## 2018-01-01 RX ORDER — HYDROMORPHONE HYDROCHLORIDE 1 MG/ML
.5-1 INJECTION, SOLUTION INTRAMUSCULAR; INTRAVENOUS; SUBCUTANEOUS
Status: DISCONTINUED | OUTPATIENT
Start: 2018-01-01 | End: 2018-01-01

## 2018-01-01 RX ORDER — CIPROFLOXACIN 500 MG/1
500 TABLET, FILM COATED ORAL EVERY 12 HOURS SCHEDULED
Status: DISCONTINUED | OUTPATIENT
Start: 2018-01-01 | End: 2018-01-01 | Stop reason: HOSPADM

## 2018-01-01 RX ORDER — HYDROMORPHONE HYDROCHLORIDE 1 MG/ML
0.5 INJECTION, SOLUTION INTRAMUSCULAR; INTRAVENOUS; SUBCUTANEOUS ONCE
Status: COMPLETED | OUTPATIENT
Start: 2018-01-01 | End: 2018-01-01

## 2018-01-01 RX ORDER — PROPOFOL 10 MG/ML
INJECTION, EMULSION INTRAVENOUS PRN
Status: DISCONTINUED | OUTPATIENT
Start: 2018-01-01 | End: 2018-01-01

## 2018-01-01 RX ORDER — SODIUM CHLORIDE 9 MG/ML
1000 INJECTION, SOLUTION INTRAVENOUS CONTINUOUS
Status: DISCONTINUED | OUTPATIENT
Start: 2018-01-01 | End: 2018-01-01 | Stop reason: HOSPADM

## 2018-01-01 RX ORDER — FENTANYL CITRATE 50 UG/ML
25-50 INJECTION, SOLUTION INTRAMUSCULAR; INTRAVENOUS
Status: DISCONTINUED | OUTPATIENT
Start: 2018-01-01 | End: 2018-01-01

## 2018-01-01 RX ORDER — METRONIDAZOLE 500 MG/1
500 TABLET ORAL EVERY 8 HOURS SCHEDULED
Status: DISCONTINUED | OUTPATIENT
Start: 2018-01-01 | End: 2018-01-01 | Stop reason: HOSPADM

## 2018-01-01 RX ORDER — PROCHLORPERAZINE MALEATE 10 MG
10 TABLET ORAL EVERY 6 HOURS PRN
Qty: 90 TABLET | Refills: 1 | Status: ON HOLD | OUTPATIENT
Start: 2018-01-01 | End: 2018-01-01

## 2018-01-01 RX ORDER — CEFTRIAXONE 1 G/1
1 INJECTION, POWDER, FOR SOLUTION INTRAMUSCULAR; INTRAVENOUS EVERY 24 HOURS
Status: DISCONTINUED | OUTPATIENT
Start: 2018-01-01 | End: 2018-01-01 | Stop reason: HOSPADM

## 2018-01-01 RX ORDER — FENTANYL 25 UG/1
PATCH TRANSDERMAL
COMMUNITY
Start: 2018-01-01 | End: 2018-01-01

## 2018-01-01 RX ORDER — ONDANSETRON 2 MG/ML
INJECTION INTRAMUSCULAR; INTRAVENOUS PRN
Status: DISCONTINUED | OUTPATIENT
Start: 2018-01-01 | End: 2018-01-01

## 2018-01-01 RX ORDER — IOPAMIDOL 755 MG/ML
82 INJECTION, SOLUTION INTRAVASCULAR ONCE
Status: COMPLETED | OUTPATIENT
Start: 2018-01-01 | End: 2018-01-01

## 2018-01-01 RX ORDER — SODIUM CHLORIDE 9 MG/ML
INJECTION, SOLUTION INTRAVENOUS CONTINUOUS
Status: ACTIVE | OUTPATIENT
Start: 2018-01-01 | End: 2018-01-01

## 2018-01-01 RX ORDER — FENTANYL 25 UG/1
1 PATCH TRANSDERMAL
Qty: 10 PATCH | Refills: 0 | Status: SHIPPED | OUTPATIENT
Start: 2018-01-01 | End: 2018-01-01

## 2018-01-01 RX ORDER — PHYTONADIONE 5 MG/1
5 TABLET ORAL ONCE
Status: COMPLETED | OUTPATIENT
Start: 2018-01-01 | End: 2018-01-01

## 2018-01-01 RX ORDER — ACETAMINOPHEN 325 MG/1
650 TABLET ORAL EVERY 4 HOURS PRN
Status: DISCONTINUED | OUTPATIENT
Start: 2018-01-01 | End: 2018-01-01 | Stop reason: HOSPADM

## 2018-01-01 RX ORDER — OXYCODONE HYDROCHLORIDE 10 MG/1
TABLET ORAL
Qty: 90 TABLET | Refills: 0 | COMMUNITY
Start: 2018-01-01 | End: 2018-01-01

## 2018-01-01 RX ORDER — HYDROMORPHONE HYDROCHLORIDE 1 MG/ML
.3-.5 INJECTION, SOLUTION INTRAMUSCULAR; INTRAVENOUS; SUBCUTANEOUS
Status: DISCONTINUED | OUTPATIENT
Start: 2018-01-01 | End: 2018-01-01 | Stop reason: HOSPADM

## 2018-01-01 RX ORDER — METHADONE HYDROCHLORIDE 5 MG/1
7.5 TABLET ORAL EVERY 12 HOURS
Qty: 9 TABLET | Refills: 0 | Status: SHIPPED | OUTPATIENT
Start: 2018-01-01 | End: 2018-01-01

## 2018-01-01 RX ORDER — OCTREOTIDE ACETATE 50 UG/ML
50 INJECTION, SOLUTION INTRAVENOUS; SUBCUTANEOUS 3 TIMES DAILY
Qty: 90 ML | Refills: 2 | Status: ON HOLD | OUTPATIENT
Start: 2018-01-01 | End: 2018-01-01

## 2018-01-01 RX ORDER — FENTANYL 12.5 UG/1
12 PATCH TRANSDERMAL
Status: DISCONTINUED | OUTPATIENT
Start: 2018-01-01 | End: 2018-01-01

## 2018-01-01 RX ORDER — LIDOCAINE 40 MG/G
CREAM TOPICAL
Status: DISCONTINUED | OUTPATIENT
Start: 2018-01-01 | End: 2018-01-01

## 2018-01-01 RX ORDER — LANOLIN ALCOHOL/MO/W.PET/CERES
1000 CREAM (GRAM) TOPICAL DAILY
Status: DISCONTINUED | OUTPATIENT
Start: 2018-01-01 | End: 2018-01-01 | Stop reason: HOSPADM

## 2018-01-01 RX ORDER — NALOXONE HYDROCHLORIDE 0.4 MG/ML
.1-.4 INJECTION, SOLUTION INTRAMUSCULAR; INTRAVENOUS; SUBCUTANEOUS
Status: DISCONTINUED | OUTPATIENT
Start: 2018-01-01 | End: 2018-01-01

## 2018-01-01 RX ORDER — SODIUM CHLORIDE 9 MG/ML
INJECTION, SOLUTION INTRAVENOUS CONTINUOUS
Status: DISCONTINUED | OUTPATIENT
Start: 2018-01-01 | End: 2018-01-01

## 2018-01-01 RX ORDER — NAFCILLIN SODIUM 2 G/8ML
2 INJECTION, POWDER, FOR SOLUTION INTRAMUSCULAR; INTRAVENOUS EVERY 4 HOURS
Status: DISCONTINUED | OUTPATIENT
Start: 2018-01-01 | End: 2018-01-01

## 2018-01-01 RX ORDER — ONDANSETRON 2 MG/ML
4 INJECTION INTRAMUSCULAR; INTRAVENOUS EVERY 30 MIN PRN
Status: DISCONTINUED | OUTPATIENT
Start: 2018-01-01 | End: 2018-01-01 | Stop reason: HOSPADM

## 2018-01-01 RX ORDER — MORPHINE 10 MG/ML
0.6 TINCTURE ORAL 4 TIMES DAILY PRN
Status: DISCONTINUED | OUTPATIENT
Start: 2018-01-01 | End: 2018-01-01 | Stop reason: HOSPADM

## 2018-01-01 RX ORDER — HEPARIN SODIUM (PORCINE) LOCK FLUSH IV SOLN 100 UNIT/ML 100 UNIT/ML
5 SOLUTION INTRAVENOUS ONCE
Status: COMPLETED | OUTPATIENT
Start: 2018-01-01 | End: 2018-01-01

## 2018-01-01 RX ORDER — DEXAMETHASONE 2 MG/1
TABLET ORAL
Status: ON HOLD | COMMUNITY
Start: 2018-01-01 | End: 2018-01-01

## 2018-01-01 RX ORDER — FENTANYL 100 UG/1
3 PATCH TRANSDERMAL
Qty: 10 PATCH | Refills: 0 | Status: SHIPPED | OUTPATIENT
Start: 2018-01-01 | End: 2018-01-01

## 2018-01-01 RX ORDER — LORAZEPAM 0.5 MG/1
0.5 TABLET ORAL EVERY 4 HOURS PRN
Qty: 30 TABLET | Refills: 5 | Status: ON HOLD | OUTPATIENT
Start: 2018-01-01 | End: 2018-01-01

## 2018-01-01 RX ORDER — LIDOCAINE HYDROCHLORIDE 20 MG/ML
INJECTION, SOLUTION INFILTRATION; PERINEURAL PRN
Status: DISCONTINUED | OUTPATIENT
Start: 2018-01-01 | End: 2018-01-01

## 2018-01-01 RX ORDER — POTASSIUM CHLORIDE 1.5 G/1.58G
40 POWDER, FOR SOLUTION ORAL ONCE
Status: COMPLETED | OUTPATIENT
Start: 2018-01-01 | End: 2018-01-01

## 2018-01-01 RX ORDER — ONDANSETRON 2 MG/ML
4 INJECTION INTRAMUSCULAR; INTRAVENOUS EVERY 30 MIN PRN
Status: DISCONTINUED | OUTPATIENT
Start: 2018-01-01 | End: 2018-01-01

## 2018-01-01 RX ORDER — FENTANYL CITRATE 50 UG/ML
25-50 INJECTION, SOLUTION INTRAMUSCULAR; INTRAVENOUS
Status: DISCONTINUED | OUTPATIENT
Start: 2018-01-01 | End: 2018-01-01 | Stop reason: HOSPADM

## 2018-01-01 RX ORDER — PANTOPRAZOLE SODIUM 40 MG/1
40 TABLET, DELAYED RELEASE ORAL
Qty: 30 TABLET | Refills: 0 | Status: SHIPPED | OUTPATIENT
Start: 2018-01-01 | End: 2018-01-01

## 2018-01-01 RX ORDER — IOPAMIDOL 755 MG/ML
95 INJECTION, SOLUTION INTRAVASCULAR ONCE
Status: COMPLETED | OUTPATIENT
Start: 2018-01-01 | End: 2018-01-01

## 2018-01-01 RX ORDER — POTASSIUM CHLORIDE 1.5 G/1.58G
20-40 POWDER, FOR SOLUTION ORAL
Status: DISCONTINUED | OUTPATIENT
Start: 2018-01-01 | End: 2018-01-01 | Stop reason: HOSPADM

## 2018-01-01 RX ORDER — MAGNESIUM SULFATE HEPTAHYDRATE 40 MG/ML
4 INJECTION, SOLUTION INTRAVENOUS EVERY 4 HOURS PRN
Status: DISCONTINUED | OUTPATIENT
Start: 2018-01-01 | End: 2018-01-01 | Stop reason: HOSPADM

## 2018-01-01 RX ORDER — OXYCODONE HYDROCHLORIDE 5 MG/1
5-10 CAPSULE ORAL EVERY 4 HOURS PRN
Qty: 120 CAPSULE | Refills: 0 | Status: ON HOLD | OUTPATIENT
Start: 2018-01-01 | End: 2018-01-01

## 2018-01-01 RX ORDER — ONDANSETRON 8 MG/1
8 TABLET, ORALLY DISINTEGRATING ORAL EVERY 8 HOURS PRN
Status: DISCONTINUED | OUTPATIENT
Start: 2018-01-01 | End: 2018-01-01 | Stop reason: HOSPADM

## 2018-01-01 RX ORDER — IOPAMIDOL 510 MG/ML
INJECTION, SOLUTION INTRAVASCULAR PRN
Status: DISCONTINUED | OUTPATIENT
Start: 2018-01-01 | End: 2018-01-01 | Stop reason: HOSPADM

## 2018-01-01 RX ORDER — ALUMINA, MAGNESIA, AND SIMETHICONE 2400; 2400; 240 MG/30ML; MG/30ML; MG/30ML
30 SUSPENSION ORAL EVERY 4 HOURS PRN
Qty: 1 BOTTLE | Refills: 0 | Status: SHIPPED | OUTPATIENT
Start: 2018-01-01 | End: 2019-01-13

## 2018-01-01 RX ORDER — HYDROMORPHONE HYDROCHLORIDE 2 MG/ML
2 INJECTION, SOLUTION INTRAMUSCULAR; INTRAVENOUS; SUBCUTANEOUS ONCE
Status: COMPLETED | OUTPATIENT
Start: 2018-01-01 | End: 2018-01-01

## 2018-01-01 RX ORDER — ONDANSETRON 8 MG/1
8 TABLET, ORALLY DISINTEGRATING ORAL
Status: DISCONTINUED | OUTPATIENT
Start: 2018-01-01 | End: 2018-01-01 | Stop reason: HOSPADM

## 2018-01-01 RX ORDER — ONDANSETRON 8 MG/1
8 TABLET, ORALLY DISINTEGRATING ORAL EVERY 8 HOURS PRN
Status: DISCONTINUED | OUTPATIENT
Start: 2018-01-01 | End: 2018-01-01

## 2018-01-01 RX ORDER — FENTANYL 37.5 UG/H
PATCH, EXTENDED RELEASE TRANSDERMAL
COMMUNITY
Start: 2018-01-01 | End: 2018-01-01

## 2018-01-01 RX ORDER — FENTANYL 100 UG/1
300 PATCH TRANSDERMAL
Status: DISCONTINUED | OUTPATIENT
Start: 2018-01-01 | End: 2018-01-01 | Stop reason: HOSPADM

## 2018-01-01 RX ORDER — FENTANYL 25 UG/1
1 PATCH TRANSDERMAL
Qty: 15 PATCH | Refills: 0 | Status: SHIPPED | OUTPATIENT
Start: 2018-01-01 | End: 2018-01-01

## 2018-01-01 RX ORDER — OXYCODONE HYDROCHLORIDE 10 MG/1
5-10 TABLET ORAL EVERY 4 HOURS PRN
Qty: 90 TABLET | Refills: 0 | Status: ON HOLD | OUTPATIENT
Start: 2018-01-01 | End: 2018-01-01

## 2018-01-01 RX ORDER — NALOXONE HYDROCHLORIDE 0.4 MG/ML
.1-.4 INJECTION, SOLUTION INTRAMUSCULAR; INTRAVENOUS; SUBCUTANEOUS
Status: ACTIVE | OUTPATIENT
Start: 2018-01-01 | End: 2018-01-01

## 2018-01-01 RX ORDER — CEFTRIAXONE 1 G/1
1 INJECTION, POWDER, FOR SOLUTION INTRAMUSCULAR; INTRAVENOUS EVERY 24 HOURS
Status: DISCONTINUED | OUTPATIENT
Start: 2018-01-01 | End: 2018-01-01 | Stop reason: CLARIF

## 2018-01-01 RX ORDER — MORPHINE 10 MG/ML
TINCTURE ORAL EVERY 4 HOURS PRN
COMMUNITY
End: 2018-01-01

## 2018-01-01 RX ORDER — PROCHLORPERAZINE MALEATE 10 MG
10 TABLET ORAL EVERY 6 HOURS PRN
Qty: 30 TABLET | Refills: 0 | Status: SHIPPED | OUTPATIENT
Start: 2018-01-01

## 2018-01-01 RX ORDER — ONDANSETRON 8 MG/1
8 TABLET, ORALLY DISINTEGRATING ORAL
Qty: 80 TABLET | Refills: 0 | Status: SHIPPED | OUTPATIENT
Start: 2018-01-01

## 2018-01-01 RX ORDER — OXYCODONE HYDROCHLORIDE 10 MG/1
TABLET ORAL
Qty: 90 TABLET | Refills: 0 | Status: SHIPPED | OUTPATIENT
Start: 2018-01-01 | End: 2018-01-01

## 2018-01-01 RX ORDER — ONDANSETRON 8 MG/1
TABLET, FILM COATED ORAL
COMMUNITY
Start: 2018-01-01 | End: 2018-01-01

## 2018-01-01 RX ORDER — FLUMAZENIL 0.1 MG/ML
0.2 INJECTION, SOLUTION INTRAVENOUS
Status: DISCONTINUED | OUTPATIENT
Start: 2018-01-01 | End: 2018-01-01 | Stop reason: HOSPADM

## 2018-01-01 RX ORDER — POTASSIUM CL/LIDO/0.9 % NACL 10MEQ/0.1L
10 INTRAVENOUS SOLUTION, PIGGYBACK (ML) INTRAVENOUS
Status: DISCONTINUED | OUTPATIENT
Start: 2018-01-01 | End: 2018-01-01 | Stop reason: RX

## 2018-01-01 RX ORDER — METRONIDAZOLE 500 MG/1
500 TABLET ORAL 3 TIMES DAILY
Qty: 21 TABLET | Refills: 0 | Status: ON HOLD | OUTPATIENT
Start: 2018-01-01 | End: 2018-01-01

## 2018-01-01 RX ORDER — HYDROMORPHONE HYDROCHLORIDE 1 MG/ML
0.5 INJECTION, SOLUTION INTRAMUSCULAR; INTRAVENOUS; SUBCUTANEOUS
Status: DISCONTINUED | OUTPATIENT
Start: 2018-01-01 | End: 2018-01-01

## 2018-01-01 RX ORDER — CEFAZOLIN SODIUM 2 G/100ML
2 INJECTION, SOLUTION INTRAVENOUS
Status: DISCONTINUED | OUTPATIENT
Start: 2018-01-01 | End: 2018-01-01 | Stop reason: HOSPADM

## 2018-01-01 RX ORDER — DEXTROSE MONOHYDRATE, SODIUM CHLORIDE, AND POTASSIUM CHLORIDE 50; 1.49; 4.5 G/1000ML; G/1000ML; G/1000ML
INJECTION, SOLUTION INTRAVENOUS CONTINUOUS
Status: DISCONTINUED | OUTPATIENT
Start: 2018-01-01 | End: 2018-01-01

## 2018-01-01 RX ORDER — OLANZAPINE 5 MG/1
5 TABLET ORAL 2 TIMES DAILY
Qty: 40 TABLET | Refills: 0 | Status: SHIPPED | OUTPATIENT
Start: 2018-01-01

## 2018-01-01 RX ORDER — ONDANSETRON 8 MG/1
8 TABLET, ORALLY DISINTEGRATING ORAL EVERY 8 HOURS PRN
Qty: 60 TABLET | Refills: 5 | Status: ON HOLD | OUTPATIENT
Start: 2018-01-01 | End: 2018-01-01

## 2018-01-01 RX ORDER — PROCHLORPERAZINE MALEATE 10 MG
10 TABLET ORAL EVERY 6 HOURS PRN
COMMUNITY
Start: 2018-01-01 | End: 2018-01-01

## 2018-01-01 RX ORDER — ONDANSETRON 8 MG/1
8 TABLET, ORALLY DISINTEGRATING ORAL EVERY 8 HOURS
Status: DISCONTINUED | OUTPATIENT
Start: 2018-01-01 | End: 2018-01-01

## 2018-01-01 RX ORDER — HEPARIN SODIUM (PORCINE) LOCK FLUSH IV SOLN 100 UNIT/ML 100 UNIT/ML
5 SOLUTION INTRAVENOUS
Status: DISCONTINUED | OUTPATIENT
Start: 2018-01-01 | End: 2018-01-01 | Stop reason: HOSPADM

## 2018-01-01 RX ORDER — OXYCODONE HCL 10 MG/1
TABLET, FILM COATED, EXTENDED RELEASE ORAL
Qty: 250 TABLET | Refills: 0 | Status: CANCELLED | OUTPATIENT
Start: 2018-01-01

## 2018-01-01 RX ORDER — HYDROMORPHONE HYDROCHLORIDE 10 MG/ML
INJECTION INTRAMUSCULAR; INTRAVENOUS; SUBCUTANEOUS
Qty: 25 ML | Refills: 0 | Status: ON HOLD | OUTPATIENT
Start: 2018-01-01 | End: 2018-01-01

## 2018-01-01 RX ORDER — DIPHENOXYLATE HCL/ATROPINE 2.5-.025MG
TABLET ORAL
COMMUNITY
Start: 2018-01-01 | End: 2018-01-01

## 2018-01-01 RX ORDER — EPINEPHRINE 0.3 MG/.3ML
INJECTION SUBCUTANEOUS
Status: DISCONTINUED
Start: 2018-01-01 | End: 2018-01-01 | Stop reason: WASHOUT

## 2018-01-01 RX ORDER — CIPROFLOXACIN 500 MG/1
500 TABLET, FILM COATED ORAL EVERY 12 HOURS
Qty: 11 TABLET | Refills: 0 | Status: SHIPPED | OUTPATIENT
Start: 2018-01-01 | End: 2018-01-01

## 2018-01-01 RX ORDER — OXYCODONE HYDROCHLORIDE 5 MG/1
5 TABLET ORAL EVERY 4 HOURS PRN
Status: DISCONTINUED | OUTPATIENT
Start: 2018-01-01 | End: 2018-01-01

## 2018-01-01 RX ORDER — METRONIDAZOLE 500 MG/1
500 TABLET ORAL 3 TIMES DAILY
Qty: 16 TABLET | Refills: 0 | Status: ON HOLD | COMMUNITY
Start: 2018-01-01 | End: 2018-01-01

## 2018-01-01 RX ORDER — IOPAMIDOL 755 MG/ML
70 INJECTION, SOLUTION INTRAVASCULAR ONCE
Status: COMPLETED | OUTPATIENT
Start: 2018-01-01 | End: 2018-01-01

## 2018-01-01 RX ORDER — DEXAMETHASONE 1 MG
TABLET ORAL
Status: ON HOLD | COMMUNITY
Start: 2018-01-01 | End: 2018-01-01

## 2018-01-01 RX ORDER — HEPARIN SODIUM,PORCINE 10 UNIT/ML
2-5 VIAL (ML) INTRAVENOUS
Status: DISCONTINUED | OUTPATIENT
Start: 2018-01-01 | End: 2018-01-01 | Stop reason: HOSPADM

## 2018-01-01 RX ORDER — AMOXICILLIN 500 MG/1
CAPSULE ORAL
COMMUNITY
Start: 2018-01-01 | End: 2018-01-01

## 2018-01-01 RX ORDER — LEVOFLOXACIN 500 MG/1
500 TABLET, FILM COATED ORAL DAILY
Qty: 7 TABLET | Refills: 0 | Status: ON HOLD | OUTPATIENT
Start: 2018-01-01 | End: 2018-01-01

## 2018-01-01 RX ORDER — DEXAMETHASONE 4 MG/1
4 TABLET ORAL EVERY MORNING
Qty: 20 TABLET | Refills: 0 | Status: SHIPPED | OUTPATIENT
Start: 2018-01-01 | End: 2019-01-14

## 2018-01-01 RX ORDER — HYDROMORPHONE HYDROCHLORIDE 1 MG/ML
.3-.5 INJECTION, SOLUTION INTRAMUSCULAR; INTRAVENOUS; SUBCUTANEOUS
Status: DISCONTINUED | OUTPATIENT
Start: 2018-01-01 | End: 2018-01-01

## 2018-01-01 RX ORDER — METRONIDAZOLE 500 MG/1
TABLET ORAL
COMMUNITY
Start: 2017-06-05 | End: 2018-01-01

## 2018-01-01 RX ORDER — HEPARIN SODIUM,PORCINE 10 UNIT/ML
2-5 VIAL (ML) INTRAVENOUS
Status: COMPLETED | OUTPATIENT
Start: 2018-01-01 | End: 2018-01-01

## 2018-01-01 RX ORDER — HEPARIN SODIUM (PORCINE) LOCK FLUSH IV SOLN 100 UNIT/ML 100 UNIT/ML
5 SOLUTION INTRAVENOUS EVERY 8 HOURS
Status: DISCONTINUED | OUTPATIENT
Start: 2018-01-01 | End: 2018-01-01 | Stop reason: HOSPADM

## 2018-01-01 RX ORDER — HYDROMORPHONE HYDROCHLORIDE 1 MG/ML
1 INJECTION, SOLUTION INTRAMUSCULAR; INTRAVENOUS; SUBCUTANEOUS ONCE
Status: COMPLETED | OUTPATIENT
Start: 2018-01-01 | End: 2018-01-01

## 2018-01-01 RX ORDER — DEXAMETHASONE 4 MG/1
4 TABLET ORAL EVERY MORNING
Status: DISCONTINUED | OUTPATIENT
Start: 2018-01-01 | End: 2018-01-01 | Stop reason: HOSPADM

## 2018-01-01 RX ORDER — ONDANSETRON 8 MG/1
8 TABLET, ORALLY DISINTEGRATING ORAL EVERY 8 HOURS
Qty: 30 TABLET | Refills: 1 | Status: ON HOLD | COMMUNITY
Start: 2018-01-01 | End: 2018-01-01

## 2018-01-01 RX ORDER — LANOLIN ALCOHOL/MO/W.PET/CERES
1000 CREAM (GRAM) TOPICAL DAILY
Status: ON HOLD | COMMUNITY
End: 2018-01-01

## 2018-01-01 RX ORDER — IOPAMIDOL 755 MG/ML
91 INJECTION, SOLUTION INTRAVASCULAR ONCE
Status: COMPLETED | OUTPATIENT
Start: 2018-01-01 | End: 2018-01-01

## 2018-01-01 RX ORDER — CEFAZOLIN SODIUM 2 G/100ML
2 INJECTION, SOLUTION INTRAVENOUS
Status: COMPLETED | OUTPATIENT
Start: 2018-01-01 | End: 2018-01-01

## 2018-01-01 RX ORDER — FERROUS SULFATE 325(65) MG
325 TABLET ORAL 2 TIMES DAILY WITH MEALS
Status: DISCONTINUED | OUTPATIENT
Start: 2018-01-01 | End: 2018-01-01 | Stop reason: HOSPADM

## 2018-01-01 RX ORDER — IOPAMIDOL 755 MG/ML
88 INJECTION, SOLUTION INTRAVASCULAR ONCE
Status: COMPLETED | OUTPATIENT
Start: 2018-01-01 | End: 2018-01-01

## 2018-01-01 RX ORDER — ONDANSETRON 2 MG/ML
8 INJECTION INTRAMUSCULAR; INTRAVENOUS EVERY 8 HOURS
Status: DISCONTINUED | OUTPATIENT
Start: 2018-01-01 | End: 2018-01-01 | Stop reason: HOSPADM

## 2018-01-01 RX ORDER — OXYCODONE HYDROCHLORIDE 5 MG/1
5-10 TABLET ORAL EVERY 4 HOURS PRN
Status: DISCONTINUED | OUTPATIENT
Start: 2018-01-01 | End: 2018-01-01

## 2018-01-01 RX ORDER — IOPAMIDOL 755 MG/ML
92 INJECTION, SOLUTION INTRAVASCULAR ONCE
Status: COMPLETED | OUTPATIENT
Start: 2018-01-01 | End: 2018-01-01

## 2018-01-01 RX ORDER — HEPARIN SODIUM,PORCINE 10 UNIT/ML
5 VIAL (ML) INTRAVENOUS ONCE
Status: COMPLETED | OUTPATIENT
Start: 2018-01-01 | End: 2018-01-01

## 2018-01-01 RX ORDER — MORPHINE 10 MG/ML
0.6 TINCTURE ORAL 4 TIMES DAILY
Qty: 72 ML | Refills: 0 | Status: SHIPPED | OUTPATIENT
Start: 2018-01-01 | End: 2018-01-01

## 2018-01-01 RX ORDER — FENTANYL 100 UG/1
3 PATCH TRANSDERMAL
Qty: 30 PATCH | Refills: 0 | Status: SHIPPED | OUTPATIENT
Start: 2018-01-01 | End: 2018-01-01

## 2018-01-01 RX ORDER — HYDROMORPHONE HYDROCHLORIDE 2 MG/1
2 TABLET ORAL EVERY 4 HOURS PRN
Qty: 20 TABLET | Refills: 0 | Status: SHIPPED | OUTPATIENT
Start: 2018-01-01 | End: 2018-01-01

## 2018-01-01 RX ORDER — FENTANYL 25 UG/1
1 PATCH TRANSDERMAL
Qty: 1 PATCH | Refills: 0 | Status: ON HOLD | OUTPATIENT
Start: 2018-01-01 | End: 2018-01-01

## 2018-01-01 RX ORDER — DEXAMETHASONE 4 MG/1
TABLET ORAL
Status: ON HOLD | COMMUNITY
Start: 2018-01-01 | End: 2018-01-01

## 2018-01-01 RX ORDER — AMOXICILLIN 250 MG
2 CAPSULE ORAL 2 TIMES DAILY PRN
Status: DISCONTINUED | OUTPATIENT
Start: 2018-01-01 | End: 2018-01-01

## 2018-01-01 RX ORDER — HEPARIN SODIUM (PORCINE) LOCK FLUSH IV SOLN 100 UNIT/ML 100 UNIT/ML
500 SOLUTION INTRAVENOUS ONCE
Status: COMPLETED | OUTPATIENT
Start: 2018-01-01 | End: 2018-01-01

## 2018-01-01 RX ORDER — LOPERAMIDE HCL 2 MG
4 CAPSULE ORAL 4 TIMES DAILY PRN
Qty: 240 CAPSULE | Refills: 11 | Status: ON HOLD | OUTPATIENT
Start: 2018-01-01 | End: 2018-01-01

## 2018-01-01 RX ORDER — HYDROMORPHONE HYDROCHLORIDE 1 MG/ML
15-20 SOLUTION ORAL
Status: DISCONTINUED | OUTPATIENT
Start: 2018-01-01 | End: 2018-01-01

## 2018-01-01 RX ORDER — PANTOPRAZOLE SODIUM 40 MG/1
40 TABLET, DELAYED RELEASE ORAL
Qty: 20 TABLET | Refills: 0 | Status: SHIPPED | OUTPATIENT
Start: 2018-01-01

## 2018-01-01 RX ORDER — HYDROMORPHONE HYDROCHLORIDE 10 MG/ML
INJECTION INTRAMUSCULAR; INTRAVENOUS; SUBCUTANEOUS
Qty: 100 ML | Refills: 0 | COMMUNITY
Start: 2018-01-01 | End: 2018-01-01

## 2018-01-01 RX ORDER — HYDROMORPHONE HYDROCHLORIDE 1 MG/ML
.3-.5 INJECTION, SOLUTION INTRAMUSCULAR; INTRAVENOUS; SUBCUTANEOUS EVERY 5 MIN PRN
Status: DISCONTINUED | OUTPATIENT
Start: 2018-01-01 | End: 2018-01-01

## 2018-01-01 RX ORDER — MULTIPLE VITAMINS W/ MINERALS TAB 9MG-400MCG
1 TAB ORAL DAILY
Status: DISCONTINUED | OUTPATIENT
Start: 2018-01-01 | End: 2018-01-01 | Stop reason: HOSPADM

## 2018-01-01 RX ORDER — AMOXICILLIN 250 MG
1 CAPSULE ORAL 2 TIMES DAILY PRN
Status: DISCONTINUED | OUTPATIENT
Start: 2018-01-01 | End: 2018-01-01

## 2018-01-01 RX ORDER — OXYCODONE HYDROCHLORIDE 5 MG/1
5-10 CAPSULE ORAL EVERY 4 HOURS PRN
Qty: 60 CAPSULE | Refills: 0 | Status: SHIPPED | OUTPATIENT
Start: 2018-01-01 | End: 2018-01-01

## 2018-01-01 RX ORDER — ONDANSETRON 8 MG/1
8 TABLET, ORALLY DISINTEGRATING ORAL EVERY 8 HOURS
Qty: 60 TABLET | Refills: 0 | Status: SHIPPED | OUTPATIENT
Start: 2018-01-01 | End: 2018-01-01

## 2018-01-01 RX ORDER — OXYCODONE HCL 10 MG/1
20 TABLET, FILM COATED, EXTENDED RELEASE ORAL EVERY 12 HOURS
Qty: 120 TABLET | Refills: 0 | Status: SHIPPED | OUTPATIENT
Start: 2018-01-01 | End: 2018-01-01

## 2018-01-01 RX ORDER — OXYCODONE HYDROCHLORIDE 10 MG/1
5-10 TABLET ORAL EVERY 4 HOURS PRN
Qty: 90 TABLET | Refills: 0 | Status: SHIPPED | OUTPATIENT
Start: 2018-01-01 | End: 2018-01-01

## 2018-01-01 RX ORDER — NITROFURANTOIN 25; 75 MG/1; MG/1
100 CAPSULE ORAL EVERY 12 HOURS SCHEDULED
Status: DISCONTINUED | OUTPATIENT
Start: 2018-01-01 | End: 2018-01-01 | Stop reason: CLARIF

## 2018-01-01 RX ORDER — DEXAMETHASONE 1 MG
2 TABLET ORAL DAILY
Qty: 21 TABLET | Refills: 0 | Status: SHIPPED | OUTPATIENT
Start: 2018-01-01 | End: 2018-01-01

## 2018-01-01 RX ORDER — IOPAMIDOL 755 MG/ML
86 INJECTION, SOLUTION INTRAVASCULAR ONCE
Status: COMPLETED | OUTPATIENT
Start: 2018-01-01 | End: 2018-01-01

## 2018-01-01 RX ORDER — FENTANYL 37.5 UG/H
37 PATCH, EXTENDED RELEASE TRANSDERMAL
Qty: 10 PATCH | Refills: 0 | Status: ON HOLD | OUTPATIENT
Start: 2018-01-01 | End: 2018-01-01

## 2018-01-01 RX ORDER — DEXAMETHASONE 4 MG/1
8 TABLET ORAL DAILY
Status: COMPLETED | OUTPATIENT
Start: 2018-01-01 | End: 2018-01-01

## 2018-01-01 RX ORDER — NALOXONE HYDROCHLORIDE 0.4 MG/ML
0.4 INJECTION, SOLUTION INTRAMUSCULAR; INTRAVENOUS; SUBCUTANEOUS
Status: DISCONTINUED | OUTPATIENT
Start: 2018-01-01 | End: 2018-01-01 | Stop reason: HOSPADM

## 2018-01-01 RX ORDER — OXYCODONE HCL 10 MG/1
10 TABLET, FILM COATED, EXTENDED RELEASE ORAL EVERY 12 HOURS
Qty: 60 TABLET | Refills: 0 | Status: SHIPPED | OUTPATIENT
Start: 2018-01-01 | End: 2018-01-01

## 2018-01-01 RX ORDER — PIPERACILLIN SODIUM, TAZOBACTAM SODIUM 4; .5 G/20ML; G/20ML
4.5 INJECTION, POWDER, LYOPHILIZED, FOR SOLUTION INTRAVENOUS EVERY 6 HOURS
Status: DISCONTINUED | OUTPATIENT
Start: 2018-01-01 | End: 2018-01-01

## 2018-01-01 RX ORDER — DIAPER,BRIEF,INFANT-TODD,DISP
EACH MISCELLANEOUS 2 TIMES DAILY PRN
Status: DISCONTINUED | OUTPATIENT
Start: 2018-01-01 | End: 2018-01-01 | Stop reason: HOSPADM

## 2018-01-01 RX ORDER — HEPARIN SODIUM (PORCINE) LOCK FLUSH IV SOLN 100 UNIT/ML 100 UNIT/ML
5 SOLUTION INTRAVENOUS
Status: COMPLETED | OUTPATIENT
Start: 2018-01-01 | End: 2018-01-01

## 2018-01-01 RX ORDER — OXYCODONE HYDROCHLORIDE 5 MG/1
5-10 CAPSULE ORAL EVERY 4 HOURS PRN
Qty: 60 CAPSULE | Refills: 0 | Status: ON HOLD | OUTPATIENT
Start: 2018-01-01 | End: 2018-01-01

## 2018-01-01 RX ORDER — OXYCODONE HCL 10 MG/1
10 TABLET, FILM COATED, EXTENDED RELEASE ORAL EVERY 12 HOURS
Status: DISCONTINUED | OUTPATIENT
Start: 2018-01-01 | End: 2018-01-01 | Stop reason: HOSPADM

## 2018-01-01 RX ORDER — MORPHINE 10 MG/ML
0.6 TINCTURE ORAL 4 TIMES DAILY
Qty: 72 ML | Refills: 0 | Status: ON HOLD | OUTPATIENT
Start: 2018-01-01 | End: 2018-01-01

## 2018-01-01 RX ORDER — LORAZEPAM 0.5 MG/1
.5-1 TABLET ORAL EVERY 4 HOURS PRN
Qty: 20 TABLET | Refills: 0 | Status: SHIPPED | OUTPATIENT
Start: 2018-01-01 | End: 2018-01-01

## 2018-01-01 RX ORDER — ONDANSETRON 2 MG/ML
8 INJECTION INTRAMUSCULAR; INTRAVENOUS EVERY 8 HOURS PRN
Status: DISCONTINUED | OUTPATIENT
Start: 2018-01-01 | End: 2018-01-01

## 2018-01-01 RX ORDER — ACETAMINOPHEN 325 MG/1
650 TABLET ORAL EVERY 4 HOURS PRN
Qty: 1 BOTTLE | Refills: 0 | Status: SHIPPED | OUTPATIENT
Start: 2018-01-01 | End: 2019-01-13

## 2018-01-01 RX ORDER — POTASSIUM CHLORIDE 29.8 MG/ML
20 INJECTION INTRAVENOUS
Status: DISCONTINUED | OUTPATIENT
Start: 2018-01-01 | End: 2018-01-01

## 2018-01-01 RX ORDER — OXYCODONE HYDROCHLORIDE 20 MG/1
20-30 TABLET ORAL EVERY 4 HOURS PRN
Qty: 180 TABLET | Refills: 0 | Status: ON HOLD | OUTPATIENT
Start: 2018-01-01 | End: 2018-01-01

## 2018-01-01 RX ORDER — FENTANYL 12.5 UG/1
1 PATCH TRANSDERMAL
Qty: 10 PATCH | Refills: 0 | COMMUNITY
Start: 2018-01-01 | End: 2018-01-01

## 2018-01-01 RX ORDER — ONDANSETRON 4 MG/1
4 TABLET, ORALLY DISINTEGRATING ORAL EVERY 30 MIN PRN
Status: DISCONTINUED | OUTPATIENT
Start: 2018-01-01 | End: 2018-01-01 | Stop reason: HOSPADM

## 2018-01-01 RX ORDER — DEXAMETHASONE 4 MG/1
8 TABLET ORAL DAILY
Status: DISCONTINUED | OUTPATIENT
Start: 2018-01-01 | End: 2018-01-01

## 2018-01-01 RX ORDER — ONDANSETRON 2 MG/ML
8 INJECTION INTRAMUSCULAR; INTRAVENOUS EVERY 6 HOURS PRN
Status: DISCONTINUED | OUTPATIENT
Start: 2018-01-01 | End: 2018-01-01 | Stop reason: HOSPADM

## 2018-01-01 RX ORDER — OXYCODONE HYDROCHLORIDE 5 MG/1
TABLET ORAL
Qty: 20 TABLET | Refills: 0 | Status: ON HOLD | OUTPATIENT
Start: 2018-01-01 | End: 2018-01-01

## 2018-01-01 RX ORDER — DRONABINOL 5 MG/1
5 CAPSULE ORAL
Qty: 60 CAPSULE | Refills: 0 | Status: ON HOLD | OUTPATIENT
Start: 2018-01-01 | End: 2018-01-01

## 2018-01-01 RX ORDER — POTASSIUM CL/LIDO/0.9 % NACL 10MEQ/0.1L
10 INTRAVENOUS SOLUTION, PIGGYBACK (ML) INTRAVENOUS
Status: DISCONTINUED | OUTPATIENT
Start: 2018-01-01 | End: 2018-01-01

## 2018-01-01 RX ORDER — DIPHENHYDRAMINE HYDROCHLORIDE 50 MG/ML
50 INJECTION INTRAMUSCULAR; INTRAVENOUS
Status: DISCONTINUED | OUTPATIENT
Start: 2018-01-01 | End: 2018-01-01 | Stop reason: HOSPADM

## 2018-01-01 RX ORDER — ONDANSETRON 4 MG/1
4 TABLET, ORALLY DISINTEGRATING ORAL EVERY 30 MIN PRN
Status: DISCONTINUED | OUTPATIENT
Start: 2018-01-01 | End: 2018-01-01

## 2018-01-01 RX ORDER — HYDROMORPHONE HYDROCHLORIDE 1 MG/ML
15-20 SOLUTION ORAL
Qty: 120 ML | Refills: 0 | Status: SHIPPED | OUTPATIENT
Start: 2018-01-01 | End: 2018-01-01

## 2018-01-01 RX ORDER — HYDROMORPHONE HYDROCHLORIDE 1 MG/ML
.3-.5 SOLUTION ORAL
Status: DISCONTINUED | OUTPATIENT
Start: 2018-01-01 | End: 2018-01-01 | Stop reason: CLARIF

## 2018-01-01 RX ORDER — HEPARIN SODIUM,PORCINE 10 UNIT/ML
5-10 VIAL (ML) INTRAVENOUS EVERY 24 HOURS
Status: DISCONTINUED | OUTPATIENT
Start: 2018-01-01 | End: 2018-01-01 | Stop reason: HOSPADM

## 2018-01-01 RX ORDER — LOPERAMIDE HCL 2 MG
2 CAPSULE ORAL 3 TIMES DAILY PRN
Status: DISCONTINUED | OUTPATIENT
Start: 2018-01-01 | End: 2018-01-01 | Stop reason: HOSPADM

## 2018-01-01 RX ORDER — ONDANSETRON 4 MG/1
8 TABLET, ORALLY DISINTEGRATING ORAL EVERY 8 HOURS
Status: DISCONTINUED | OUTPATIENT
Start: 2018-01-01 | End: 2018-01-01 | Stop reason: HOSPADM

## 2018-01-01 RX ORDER — ONDANSETRON 2 MG/ML
8 INJECTION INTRAMUSCULAR; INTRAVENOUS EVERY 6 HOURS PRN
Status: DISCONTINUED | OUTPATIENT
Start: 2018-01-01 | End: 2018-01-01

## 2018-01-01 RX ORDER — HEPARIN SODIUM,PORCINE 10 UNIT/ML
5-10 VIAL (ML) INTRAVENOUS
Status: DISCONTINUED | OUTPATIENT
Start: 2018-01-01 | End: 2018-01-01 | Stop reason: HOSPADM

## 2018-01-01 RX ADMIN — HYDROMORPHONE HYDROCHLORIDE 2 MG: 1 INJECTION, SOLUTION INTRAMUSCULAR; INTRAVENOUS; SUBCUTANEOUS at 13:07

## 2018-01-01 RX ADMIN — ONDANSETRON 8 MG: 4 TABLET, ORALLY DISINTEGRATING ORAL at 18:59

## 2018-01-01 RX ADMIN — ONDANSETRON 8 MG: 8 TABLET, ORALLY DISINTEGRATING ORAL at 11:42

## 2018-01-01 RX ADMIN — FENTANYL 3 PATCH: 100 PATCH, EXTENDED RELEASE TRANSDERMAL at 05:29

## 2018-01-01 RX ADMIN — DEXTROSE MONOHYDRATE 340 MG: 50 INJECTION, SOLUTION INTRAVENOUS at 10:37

## 2018-01-01 RX ADMIN — SODIUM CHLORIDE 1000 ML: 9 INJECTION, SOLUTION INTRAVENOUS at 07:57

## 2018-01-01 RX ADMIN — ONDANSETRON 8 MG: 8 TABLET, ORALLY DISINTEGRATING ORAL at 17:07

## 2018-01-01 RX ADMIN — MIDAZOLAM 2 MG: 1 INJECTION INTRAMUSCULAR; INTRAVENOUS at 11:26

## 2018-01-01 RX ADMIN — ENOXAPARIN SODIUM 40 MG: 40 INJECTION SUBCUTANEOUS at 07:41

## 2018-01-01 RX ADMIN — SODIUM CHLORIDE, PRESERVATIVE FREE 5 ML: 5 INJECTION INTRAVENOUS at 09:16

## 2018-01-01 RX ADMIN — SODIUM CHLORIDE, POTASSIUM CHLORIDE, SODIUM LACTATE AND CALCIUM CHLORIDE 1000 ML: 600; 310; 30; 20 INJECTION, SOLUTION INTRAVENOUS at 15:16

## 2018-01-01 RX ADMIN — I.V. FAT EMULSION 250 ML: 20 EMULSION INTRAVENOUS at 20:02

## 2018-01-01 RX ADMIN — PANITUMUMAB 400 MG: 400 SOLUTION INTRAVENOUS at 09:52

## 2018-01-01 RX ADMIN — MULTIPLE VITAMINS W/ MINERALS TAB 1 TABLET: TAB at 08:27

## 2018-01-01 RX ADMIN — ENOXAPARIN SODIUM 40 MG: 100 INJECTION SUBCUTANEOUS at 13:10

## 2018-01-01 RX ADMIN — BENZOCAINE AND MENTHOL 1 LOZENGE: 15; 3.6 LOZENGE ORAL at 10:15

## 2018-01-01 RX ADMIN — SODIUM CHLORIDE 1000 ML: 9 INJECTION, SOLUTION INTRAVENOUS at 11:53

## 2018-01-01 RX ADMIN — OXYCODONE HYDROCHLORIDE 5 MG: 5 TABLET ORAL at 06:25

## 2018-01-01 RX ADMIN — Medication 0.5 MG: at 14:38

## 2018-01-01 RX ADMIN — OXALIPLATIN 130 MG: 5 INJECTION INTRAVENOUS at 08:47

## 2018-01-01 RX ADMIN — ONDANSETRON HYDROCHLORIDE 8 MG: 8 TABLET, FILM COATED ORAL at 14:06

## 2018-01-01 RX ADMIN — OXYCODONE HYDROCHLORIDE 10 MG: 5 TABLET ORAL at 01:07

## 2018-01-01 RX ADMIN — ENOXAPARIN SODIUM 40 MG: 100 INJECTION SUBCUTANEOUS at 17:18

## 2018-01-01 RX ADMIN — ONDANSETRON HYDROCHLORIDE 8 MG: 2 INJECTION, SOLUTION INTRAMUSCULAR; INTRAVENOUS at 02:50

## 2018-01-01 RX ADMIN — DEXAMETHASONE SODIUM PHOSPHATE 12 MG: 10 INJECTION, SOLUTION INTRAMUSCULAR; INTRAVENOUS at 13:48

## 2018-01-01 RX ADMIN — Medication 300 MG: at 14:24

## 2018-01-01 RX ADMIN — HYDROMORPHONE HYDROCHLORIDE 0.5 MG: 1 INJECTION, SOLUTION INTRAMUSCULAR; INTRAVENOUS; SUBCUTANEOUS at 11:59

## 2018-01-01 RX ADMIN — DEXAMETHASONE SODIUM PHOSPHATE: 10 INJECTION, SOLUTION INTRAMUSCULAR; INTRAVENOUS at 12:53

## 2018-01-01 RX ADMIN — SODIUM CHLORIDE 1000 ML: 9 INJECTION, SOLUTION INTRAVENOUS at 03:43

## 2018-01-01 RX ADMIN — OMEPRAZOLE 20 MG: 20 CAPSULE, DELAYED RELEASE ORAL at 18:20

## 2018-01-01 RX ADMIN — POTASSIUM PHOSPHATE, MONOBASIC AND POTASSIUM PHOSPHATE, DIBASIC 20 MMOL: 224; 236 INJECTION, SOLUTION INTRAVENOUS at 00:12

## 2018-01-01 RX ADMIN — ALTEPLASE 2 MG: 2.2 INJECTION, POWDER, LYOPHILIZED, FOR SOLUTION INTRAVENOUS at 09:25

## 2018-01-01 RX ADMIN — OXYCODONE HYDROCHLORIDE 10 MG: 5 TABLET ORAL at 13:41

## 2018-01-01 RX ADMIN — PHYTONADIONE: 1 INJECTION, EMULSION INTRAMUSCULAR; INTRAVENOUS; SUBCUTANEOUS at 20:48

## 2018-01-01 RX ADMIN — OXYCODONE HYDROCHLORIDE 10 MG: 5 TABLET ORAL at 08:33

## 2018-01-01 RX ADMIN — PANTOPRAZOLE SODIUM 40 MG: 40 INJECTION, POWDER, FOR SOLUTION INTRAVENOUS at 15:03

## 2018-01-01 RX ADMIN — DEXAMETHASONE SODIUM PHOSPHATE 150 MG: 10 INJECTION, SOLUTION INTRAMUSCULAR; INTRAVENOUS at 11:37

## 2018-01-01 RX ADMIN — SODIUM CHLORIDE, PRESERVATIVE FREE 5 ML: 5 INJECTION INTRAVENOUS at 11:39

## 2018-01-01 RX ADMIN — CYANOCOBALAMIN TAB 1000 MCG 1000 MCG: 1000 TAB at 08:27

## 2018-01-01 RX ADMIN — I.V. FAT EMULSION 250 ML: 20 EMULSION INTRAVENOUS at 20:14

## 2018-01-01 RX ADMIN — METRONIDAZOLE 500 MG: 500 TABLET ORAL at 22:25

## 2018-01-01 RX ADMIN — ONDANSETRON 8 MG: 8 TABLET, ORALLY DISINTEGRATING ORAL at 01:43

## 2018-01-01 RX ADMIN — Medication 300 MG: at 21:34

## 2018-01-01 RX ADMIN — FERROUS SULFATE 325 MG: 325 TABLET, FILM COATED ORAL at 09:16

## 2018-01-01 RX ADMIN — Medication 0.5 MG: at 15:16

## 2018-01-01 RX ADMIN — ONDANSETRON HYDROCHLORIDE 8 MG: 8 TABLET, FILM COATED ORAL at 20:47

## 2018-01-01 RX ADMIN — PHYTONADIONE: 1 INJECTION, EMULSION INTRAMUSCULAR; INTRAVENOUS; SUBCUTANEOUS at 20:08

## 2018-01-01 RX ADMIN — PHYTONADIONE: 1 INJECTION, EMULSION INTRAMUSCULAR; INTRAVENOUS; SUBCUTANEOUS at 20:17

## 2018-01-01 RX ADMIN — BENZOCAINE AND MENTHOL 1 LOZENGE: 15; 3.6 LOZENGE ORAL at 11:30

## 2018-01-01 RX ADMIN — HYDROMORPHONE HYDROCHLORIDE 0.5 MG: 1 INJECTION, SOLUTION INTRAMUSCULAR; INTRAVENOUS; SUBCUTANEOUS at 08:30

## 2018-01-01 RX ADMIN — HEPARIN SODIUM 5000 UNITS: 5000 INJECTION, SOLUTION INTRAVENOUS; SUBCUTANEOUS at 10:29

## 2018-01-01 RX ADMIN — ONDANSETRON HYDROCHLORIDE 8 MG: 2 INJECTION, SOLUTION INTRAMUSCULAR; INTRAVENOUS at 07:55

## 2018-01-01 RX ADMIN — PHYTONADIONE: 1 INJECTION, EMULSION INTRAMUSCULAR; INTRAVENOUS; SUBCUTANEOUS at 20:02

## 2018-01-01 RX ADMIN — ONDANSETRON 8 MG: 4 TABLET, ORALLY DISINTEGRATING ORAL at 11:08

## 2018-01-01 RX ADMIN — BENZOCAINE AND MENTHOL 1 LOZENGE: 15; 3.6 LOZENGE ORAL at 00:03

## 2018-01-01 RX ADMIN — OXYCODONE HYDROCHLORIDE 10 MG: 5 TABLET ORAL at 03:59

## 2018-01-01 RX ADMIN — ONDANSETRON HYDROCHLORIDE 8 MG: 8 TABLET, FILM COATED ORAL at 14:53

## 2018-01-01 RX ADMIN — IOPAMIDOL 86 ML: 755 INJECTION, SOLUTION INTRAVASCULAR at 11:08

## 2018-01-01 RX ADMIN — PHYTONADIONE: 1 INJECTION, EMULSION INTRAMUSCULAR; INTRAVENOUS; SUBCUTANEOUS at 20:44

## 2018-01-01 RX ADMIN — OXYCODONE HYDROCHLORIDE 5 MG: 5 TABLET ORAL at 20:56

## 2018-01-01 RX ADMIN — BENZOCAINE AND MENTHOL 1 LOZENGE: 15; 3.6 LOZENGE ORAL at 09:10

## 2018-01-01 RX ADMIN — HYDROMORPHONE HYDROCHLORIDE 1 MG: 1 INJECTION, SOLUTION INTRAMUSCULAR; INTRAVENOUS; SUBCUTANEOUS at 22:31

## 2018-01-01 RX ADMIN — SODIUM CHLORIDE, POTASSIUM CHLORIDE, SODIUM LACTATE AND CALCIUM CHLORIDE: 600; 310; 30; 20 INJECTION, SOLUTION INTRAVENOUS at 17:47

## 2018-01-01 RX ADMIN — FENTANYL 1 PATCH: 100 PATCH, EXTENDED RELEASE TRANSDERMAL at 13:37

## 2018-01-01 RX ADMIN — OXYCODONE HYDROCHLORIDE 10 MG: 10 TABLET, FILM COATED, EXTENDED RELEASE ORAL at 10:05

## 2018-01-01 RX ADMIN — FENTANYL 1 PATCH: 25 PATCH, EXTENDED RELEASE TRANSDERMAL at 20:33

## 2018-01-01 RX ADMIN — HYDROMORPHONE HYDROCHLORIDE 1 MG: 1 INJECTION, SOLUTION INTRAMUSCULAR; INTRAVENOUS; SUBCUTANEOUS at 07:34

## 2018-01-01 RX ADMIN — Medication 300 MG: at 14:06

## 2018-01-01 RX ADMIN — METRONIDAZOLE 500 MG: 500 TABLET ORAL at 14:05

## 2018-01-01 RX ADMIN — OXYCODONE HYDROCHLORIDE 10 MG: 5 TABLET ORAL at 10:14

## 2018-01-01 RX ADMIN — HYDROCORTISONE: 5 CREAM TOPICAL at 17:13

## 2018-01-01 RX ADMIN — OXYCODONE HYDROCHLORIDE 10 MG: 5 TABLET ORAL at 10:05

## 2018-01-01 RX ADMIN — ENOXAPARIN SODIUM 40 MG: 40 INJECTION SUBCUTANEOUS at 16:16

## 2018-01-01 RX ADMIN — ONDANSETRON HYDROCHLORIDE 8 MG: 8 TABLET, FILM COATED ORAL at 21:34

## 2018-01-01 RX ADMIN — OXYCODONE HYDROCHLORIDE 10 MG: 5 TABLET ORAL at 00:20

## 2018-01-01 RX ADMIN — SODIUM CHLORIDE, POTASSIUM CHLORIDE, SODIUM LACTATE AND CALCIUM CHLORIDE: 600; 310; 30; 20 INJECTION, SOLUTION INTRAVENOUS at 12:39

## 2018-01-01 RX ADMIN — PHYTONADIONE: 1 INJECTION, EMULSION INTRAMUSCULAR; INTRAVENOUS; SUBCUTANEOUS at 16:01

## 2018-01-01 RX ADMIN — ONDANSETRON 8 MG: 8 TABLET, ORALLY DISINTEGRATING ORAL at 11:24

## 2018-01-01 RX ADMIN — OXYCODONE HYDROCHLORIDE 10 MG: 10 TABLET, FILM COATED, EXTENDED RELEASE ORAL at 10:34

## 2018-01-01 RX ADMIN — POTASSIUM CHLORIDE 10 MEQ: 10 INJECTION, SOLUTION INTRAVENOUS at 21:52

## 2018-01-01 RX ADMIN — PIPERACILLIN SODIUM AND TAZOBACTAM SODIUM 3.38 G: 3; .375 INJECTION, POWDER, LYOPHILIZED, FOR SOLUTION INTRAVENOUS at 21:32

## 2018-01-01 RX ADMIN — PHYTONADIONE: 1 INJECTION, EMULSION INTRAMUSCULAR; INTRAVENOUS; SUBCUTANEOUS at 20:49

## 2018-01-01 RX ADMIN — I.V. FAT EMULSION 250 ML: 20 EMULSION INTRAVENOUS at 20:44

## 2018-01-01 RX ADMIN — HEPARIN SODIUM (PORCINE) LOCK FLUSH IV SOLN 100 UNIT/ML 500 UNITS: 100 SOLUTION at 11:26

## 2018-01-01 RX ADMIN — SODIUM CHLORIDE, POTASSIUM CHLORIDE, SODIUM LACTATE AND CALCIUM CHLORIDE: 600; 310; 30; 20 INJECTION, SOLUTION INTRAVENOUS at 04:26

## 2018-01-01 RX ADMIN — SODIUM CHLORIDE, POTASSIUM CHLORIDE, SODIUM LACTATE AND CALCIUM CHLORIDE 1000 ML: 600; 310; 30; 20 INJECTION, SOLUTION INTRAVENOUS at 18:22

## 2018-01-01 RX ADMIN — PROCHLORPERAZINE EDISYLATE 10 MG: 5 INJECTION INTRAMUSCULAR; INTRAVENOUS at 13:51

## 2018-01-01 RX ADMIN — HYDROMORPHONE HYDROCHLORIDE 1 MG: 1 INJECTION, SOLUTION INTRAMUSCULAR; INTRAVENOUS; SUBCUTANEOUS at 15:59

## 2018-01-01 RX ADMIN — ALTEPLASE 2 MG: 2.2 INJECTION, POWDER, LYOPHILIZED, FOR SOLUTION INTRAVENOUS at 15:56

## 2018-01-01 RX ADMIN — DRONABINOL 5 MG: 5 CAPSULE ORAL at 08:29

## 2018-01-01 RX ADMIN — METHADONE HYDROCHLORIDE 5 MG: 5 TABLET ORAL at 07:54

## 2018-01-01 RX ADMIN — ONDANSETRON 4 MG: 2 INJECTION INTRAMUSCULAR; INTRAVENOUS at 11:48

## 2018-01-01 RX ADMIN — CIPROFLOXACIN HYDROCHLORIDE 500 MG: 500 TABLET, FILM COATED ORAL at 19:51

## 2018-01-01 RX ADMIN — HYDROMORPHONE HYDROCHLORIDE 1.5 MG: 1 INJECTION, SOLUTION INTRAMUSCULAR; INTRAVENOUS; SUBCUTANEOUS at 15:35

## 2018-01-01 RX ADMIN — OMEPRAZOLE 20 MG: 20 CAPSULE, DELAYED RELEASE ORAL at 07:56

## 2018-01-01 RX ADMIN — DEXAMETHASONE 4 MG: 4 TABLET ORAL at 08:56

## 2018-01-01 RX ADMIN — ENOXAPARIN SODIUM 40 MG: 100 INJECTION SUBCUTANEOUS at 20:39

## 2018-01-01 RX ADMIN — SODIUM CHLORIDE, PRESERVATIVE FREE 5 ML: 5 INJECTION INTRAVENOUS at 18:22

## 2018-01-01 RX ADMIN — Medication 20 MG: at 20:25

## 2018-01-01 RX ADMIN — SODIUM CHLORIDE, PRESERVATIVE FREE 73 ML: 5 INJECTION INTRAVENOUS at 13:03

## 2018-01-01 RX ADMIN — OXYCODONE HYDROCHLORIDE 10 MG: 5 TABLET ORAL at 10:53

## 2018-01-01 RX ADMIN — I.V. FAT EMULSION 250 ML: 20 EMULSION INTRAVENOUS at 20:47

## 2018-01-01 RX ADMIN — Medication 7.5 MG: at 19:49

## 2018-01-01 RX ADMIN — ONDANSETRON HYDROCHLORIDE 8 MG: 2 INJECTION, SOLUTION INTRAMUSCULAR; INTRAVENOUS at 08:21

## 2018-01-01 RX ADMIN — CEFTRIAXONE SODIUM 2 G: 10 INJECTION, POWDER, FOR SOLUTION INTRAVENOUS at 14:00

## 2018-01-01 RX ADMIN — Medication: at 17:36

## 2018-01-01 RX ADMIN — PANTOPRAZOLE SODIUM 40 MG: 40 INJECTION, POWDER, FOR SOLUTION INTRAVENOUS at 07:56

## 2018-01-01 RX ADMIN — SODIUM CHLORIDE: 9 INJECTION, SOLUTION INTRAVENOUS at 14:32

## 2018-01-01 RX ADMIN — VANCOMYCIN HYDROCHLORIDE 1250 MG: 10 INJECTION, POWDER, LYOPHILIZED, FOR SOLUTION INTRAVENOUS at 23:00

## 2018-01-01 RX ADMIN — ONDANSETRON 8 MG: 8 TABLET, ORALLY DISINTEGRATING ORAL at 17:18

## 2018-01-01 RX ADMIN — OLANZAPINE 5 MG: 2.5 TABLET, FILM COATED ORAL at 08:49

## 2018-01-01 RX ADMIN — OXYCODONE HYDROCHLORIDE 10 MG: 5 TABLET ORAL at 11:29

## 2018-01-01 RX ADMIN — Medication 300 MG: at 20:58

## 2018-01-01 RX ADMIN — HYDROMORPHONE HYDROCHLORIDE 0.5 MG: 1 INJECTION, SOLUTION INTRAMUSCULAR; INTRAVENOUS; SUBCUTANEOUS at 03:57

## 2018-01-01 RX ADMIN — MAGNESIUM OXIDE TAB 400 MG (241.3 MG ELEMENTAL MG) 400 MG: 400 (241.3 MG) TAB at 20:47

## 2018-01-01 RX ADMIN — HYDROMORPHONE HYDROCHLORIDE 1 MG: 1 INJECTION, SOLUTION INTRAMUSCULAR; INTRAVENOUS; SUBCUTANEOUS at 09:01

## 2018-01-01 RX ADMIN — ACETAMINOPHEN 650 MG: 325 TABLET, FILM COATED ORAL at 18:50

## 2018-01-01 RX ADMIN — OXYCODONE HYDROCHLORIDE 20 MG: 20 TABLET, FILM COATED, EXTENDED RELEASE ORAL at 05:37

## 2018-01-01 RX ADMIN — OXYCODONE HYDROCHLORIDE 10 MG: 5 TABLET ORAL at 01:20

## 2018-01-01 RX ADMIN — SODIUM CHLORIDE 2000 ML: 9 INJECTION, SOLUTION INTRAVENOUS at 15:19

## 2018-01-01 RX ADMIN — OXYCODONE HYDROCHLORIDE 20 MG: 10 TABLET ORAL at 14:05

## 2018-01-01 RX ADMIN — POTASSIUM CHLORIDE: 2 INJECTION, SOLUTION, CONCENTRATE INTRAVENOUS at 10:15

## 2018-01-01 RX ADMIN — DEXAMETHASONE SODIUM PHOSPHATE 150 MG: 10 INJECTION, SOLUTION INTRAMUSCULAR; INTRAVENOUS at 10:54

## 2018-01-01 RX ADMIN — HYDROMORPHONE HYDROCHLORIDE 0.5 MG: 1 INJECTION, SOLUTION INTRAMUSCULAR; INTRAVENOUS; SUBCUTANEOUS at 08:25

## 2018-01-01 RX ADMIN — PIPERACILLIN SODIUM AND TAZOBACTAM SODIUM 3.38 G: 36; 4.5 INJECTION, POWDER, FOR SOLUTION INTRAVENOUS at 08:00

## 2018-01-01 RX ADMIN — PANTOPRAZOLE SODIUM 40 MG: 40 INJECTION, POWDER, FOR SOLUTION INTRAVENOUS at 08:50

## 2018-01-01 RX ADMIN — POTASSIUM CHLORIDE AND SODIUM CHLORIDE: 900; 150 INJECTION, SOLUTION INTRAVENOUS at 01:28

## 2018-01-01 RX ADMIN — DEXAMETHASONE 4 MG: 4 TABLET ORAL at 08:22

## 2018-01-01 RX ADMIN — DRONABINOL 5 MG: 5 CAPSULE ORAL at 08:28

## 2018-01-01 RX ADMIN — SODIUM CHLORIDE: 9 INJECTION, SOLUTION INTRAVENOUS at 17:31

## 2018-01-01 RX ADMIN — ALTEPLASE 2 MG: 2.2 INJECTION, POWDER, LYOPHILIZED, FOR SOLUTION INTRAVENOUS at 12:14

## 2018-01-01 RX ADMIN — PANTOPRAZOLE SODIUM 40 MG: 40 TABLET, DELAYED RELEASE ORAL at 10:28

## 2018-01-01 RX ADMIN — SODIUM CHLORIDE 1000 ML: 9 INJECTION, SOLUTION INTRAVENOUS at 08:00

## 2018-01-01 RX ADMIN — ONDANSETRON 8 MG: 8 TABLET, ORALLY DISINTEGRATING ORAL at 13:19

## 2018-01-01 RX ADMIN — Medication: at 06:41

## 2018-01-01 RX ADMIN — CEFEPIME HYDROCHLORIDE 2 G: 2 INJECTION, POWDER, FOR SOLUTION INTRAVENOUS at 09:50

## 2018-01-01 RX ADMIN — HYDROMORPHONE HYDROCHLORIDE 0.5 MG: 1 INJECTION, SOLUTION INTRAMUSCULAR; INTRAVENOUS; SUBCUTANEOUS at 08:45

## 2018-01-01 RX ADMIN — DEXTROSE MONOHYDRATE 250 ML: 5 INJECTION, SOLUTION INTRAVENOUS at 10:48

## 2018-01-01 RX ADMIN — I.V. FAT EMULSION 250 ML: 20 EMULSION INTRAVENOUS at 20:15

## 2018-01-01 RX ADMIN — SODIUM CHLORIDE, POTASSIUM CHLORIDE, SODIUM LACTATE AND CALCIUM CHLORIDE: 600; 310; 30; 20 INJECTION, SOLUTION INTRAVENOUS at 23:37

## 2018-01-01 RX ADMIN — Medication 20 MG: at 12:28

## 2018-01-01 RX ADMIN — OMEPRAZOLE 20 MG: 20 CAPSULE, DELAYED RELEASE ORAL at 08:48

## 2018-01-01 RX ADMIN — FENTANYL CITRATE 100 MCG: 50 INJECTION INTRAMUSCULAR; INTRAVENOUS at 12:25

## 2018-01-01 RX ADMIN — SODIUM CHLORIDE, PRESERVATIVE FREE 5 ML: 5 INJECTION INTRAVENOUS at 10:07

## 2018-01-01 RX ADMIN — MULTIPLE VITAMINS W/ MINERALS TAB 1 TABLET: TAB at 08:28

## 2018-01-01 RX ADMIN — SODIUM CHLORIDE 1000 ML: 9 INJECTION, SOLUTION INTRAVENOUS at 11:15

## 2018-01-01 RX ADMIN — DEXAMETHASONE 4 MG: 4 TABLET ORAL at 11:23

## 2018-01-01 RX ADMIN — HYDROMORPHONE HYDROCHLORIDE 1 MG: 1 INJECTION, SOLUTION INTRAMUSCULAR; INTRAVENOUS; SUBCUTANEOUS at 11:24

## 2018-01-01 RX ADMIN — OMEPRAZOLE 20 MG: 20 CAPSULE, DELAYED RELEASE ORAL at 08:45

## 2018-01-01 RX ADMIN — PHENYLEPHRINE HYDROCHLORIDE 100 MCG: 10 INJECTION, SOLUTION INTRAMUSCULAR; INTRAVENOUS; SUBCUTANEOUS at 12:08

## 2018-01-01 RX ADMIN — Medication 300 MG: at 13:10

## 2018-01-01 RX ADMIN — HYDROMORPHONE HYDROCHLORIDE 0.5 MG: 1 INJECTION, SOLUTION INTRAMUSCULAR; INTRAVENOUS; SUBCUTANEOUS at 05:20

## 2018-01-01 RX ADMIN — ACETAMINOPHEN 650 MG: 325 TABLET, FILM COATED ORAL at 20:10

## 2018-01-01 RX ADMIN — VANCOMYCIN HYDROCHLORIDE 1500 MG: 10 INJECTION, POWDER, LYOPHILIZED, FOR SOLUTION INTRAVENOUS at 09:31

## 2018-01-01 RX ADMIN — OXYCODONE HYDROCHLORIDE 10 MG: 10 TABLET, FILM COATED, EXTENDED RELEASE ORAL at 22:36

## 2018-01-01 RX ADMIN — Medication 20 MG: at 17:18

## 2018-01-01 RX ADMIN — HYDROMORPHONE HYDROCHLORIDE 1 MG: 1 INJECTION, SOLUTION INTRAMUSCULAR; INTRAVENOUS; SUBCUTANEOUS at 13:41

## 2018-01-01 RX ADMIN — SODIUM CHLORIDE 1000 ML: 9 INJECTION, SOLUTION INTRAVENOUS at 04:55

## 2018-01-01 RX ADMIN — IOPAMIDOL 95 ML: 755 INJECTION, SOLUTION INTRAVASCULAR at 11:10

## 2018-01-01 RX ADMIN — HYDROMORPHONE HYDROCHLORIDE 2 MG: 1 INJECTION, SOLUTION INTRAMUSCULAR; INTRAVENOUS; SUBCUTANEOUS at 05:05

## 2018-01-01 RX ADMIN — HYDROMORPHONE HYDROCHLORIDE 1.5 MG: 1 INJECTION, SOLUTION INTRAMUSCULAR; INTRAVENOUS; SUBCUTANEOUS at 17:38

## 2018-01-01 RX ADMIN — BENZOCAINE AND MENTHOL 1 LOZENGE: 15; 3.6 LOZENGE ORAL at 05:27

## 2018-01-01 RX ADMIN — LIDOCAINE HYDROCHLORIDE 20 ML: 10 INJECTION, SOLUTION EPIDURAL; INFILTRATION; INTRACAUDAL; PERINEURAL at 12:37

## 2018-01-01 RX ADMIN — SODIUM CHLORIDE 150 MG: 9 INJECTION, SOLUTION INTRAVENOUS at 14:34

## 2018-01-01 RX ADMIN — ONDANSETRON 8 MG: 2 INJECTION INTRAMUSCULAR; INTRAVENOUS at 11:43

## 2018-01-01 RX ADMIN — OXYCODONE HYDROCHLORIDE 20 MG: 20 TABLET, FILM COATED, EXTENDED RELEASE ORAL at 17:39

## 2018-01-01 RX ADMIN — PIPERACILLIN SODIUM,TAZOBACTAM SODIUM 4.5 G: 4; .5 INJECTION, POWDER, FOR SOLUTION INTRAVENOUS at 18:17

## 2018-01-01 RX ADMIN — SODIUM CHLORIDE, POTASSIUM CHLORIDE, SODIUM LACTATE AND CALCIUM CHLORIDE: 600; 310; 30; 20 INJECTION, SOLUTION INTRAVENOUS at 16:08

## 2018-01-01 RX ADMIN — PANTOPRAZOLE SODIUM 40 MG: 40 INJECTION, POWDER, FOR SOLUTION INTRAVENOUS at 08:53

## 2018-01-01 RX ADMIN — HYDROMORPHONE HYDROCHLORIDE 0.5 MG: 1 INJECTION, SOLUTION INTRAMUSCULAR; INTRAVENOUS; SUBCUTANEOUS at 03:11

## 2018-01-01 RX ADMIN — SODIUM CHLORIDE: 9 INJECTION, SOLUTION INTRAVENOUS at 17:01

## 2018-01-01 RX ADMIN — LEUCOVORIN CALCIUM 650 MG: 500 INJECTION, POWDER, LYOPHILIZED, FOR SOLUTION INTRAMUSCULAR; INTRAVENOUS at 08:47

## 2018-01-01 RX ADMIN — HYDROMORPHONE HYDROCHLORIDE 0.5 MG: 1 INJECTION, SOLUTION INTRAMUSCULAR; INTRAVENOUS; SUBCUTANEOUS at 13:17

## 2018-01-01 RX ADMIN — SODIUM CHLORIDE: 9 INJECTION, SOLUTION INTRAVENOUS at 03:54

## 2018-01-01 RX ADMIN — PANTOPRAZOLE SODIUM 40 MG: 40 INJECTION, POWDER, FOR SOLUTION INTRAVENOUS at 08:42

## 2018-01-01 RX ADMIN — ONDANSETRON 8 MG: 2 INJECTION INTRAMUSCULAR; INTRAVENOUS at 15:43

## 2018-01-01 RX ADMIN — ONDANSETRON 8 MG: 8 TABLET, ORALLY DISINTEGRATING ORAL at 22:07

## 2018-01-01 RX ADMIN — OXYCODONE HYDROCHLORIDE 20 MG: 10 TABLET ORAL at 06:38

## 2018-01-01 RX ADMIN — ENOXAPARIN SODIUM 40 MG: 40 INJECTION SUBCUTANEOUS at 08:26

## 2018-01-01 RX ADMIN — ONDANSETRON HYDROCHLORIDE 8 MG: 8 TABLET, FILM COATED ORAL at 21:04

## 2018-01-01 RX ADMIN — HYDROMORPHONE HYDROCHLORIDE 0.5 MG: 1 INJECTION, SOLUTION INTRAMUSCULAR; INTRAVENOUS; SUBCUTANEOUS at 12:36

## 2018-01-01 RX ADMIN — OMEPRAZOLE 20 MG: 20 CAPSULE, DELAYED RELEASE ORAL at 08:26

## 2018-01-01 RX ADMIN — I.V. FAT EMULSION 250 ML: 20 EMULSION INTRAVENOUS at 10:31

## 2018-01-01 RX ADMIN — OXYCODONE HYDROCHLORIDE 10 MG: 5 TABLET ORAL at 22:57

## 2018-01-01 RX ADMIN — FENTANYL 1 PATCH: 25 PATCH, EXTENDED RELEASE TRANSDERMAL at 22:08

## 2018-01-01 RX ADMIN — HYDROMORPHONE HYDROCHLORIDE 0.5 MG: 1 INJECTION, SOLUTION INTRAMUSCULAR; INTRAVENOUS; SUBCUTANEOUS at 10:07

## 2018-01-01 RX ADMIN — ONDANSETRON HYDROCHLORIDE 8 MG: 8 TABLET, FILM COATED ORAL at 14:29

## 2018-01-01 RX ADMIN — ONDANSETRON 8 MG: 4 TABLET, ORALLY DISINTEGRATING ORAL at 07:47

## 2018-01-01 RX ADMIN — SODIUM CHLORIDE: 9 INJECTION, SOLUTION INTRAVENOUS at 12:15

## 2018-01-01 RX ADMIN — SODIUM CHLORIDE 1000 ML: 9 INJECTION, SOLUTION INTRAVENOUS at 08:14

## 2018-01-01 RX ADMIN — HYDROMORPHONE HYDROCHLORIDE 1 MG: 1 INJECTION, SOLUTION INTRAMUSCULAR; INTRAVENOUS; SUBCUTANEOUS at 04:57

## 2018-01-01 RX ADMIN — HEPARIN SODIUM 5000 UNITS: 5000 INJECTION, SOLUTION INTRAVENOUS; SUBCUTANEOUS at 13:07

## 2018-01-01 RX ADMIN — BENZOCAINE AND MENTHOL 1 LOZENGE: 15; 3.6 LOZENGE ORAL at 23:10

## 2018-01-01 RX ADMIN — MULTIPLE VITAMINS W/ MINERALS TAB 1 TABLET: TAB at 07:51

## 2018-01-01 RX ADMIN — METHADONE HYDROCHLORIDE 5 MG: 5 TABLET ORAL at 08:22

## 2018-01-01 RX ADMIN — ONDANSETRON 8 MG: 2 INJECTION INTRAMUSCULAR; INTRAVENOUS at 10:48

## 2018-01-01 RX ADMIN — PHYTONADIONE: 1 INJECTION, EMULSION INTRAMUSCULAR; INTRAVENOUS; SUBCUTANEOUS at 20:50

## 2018-01-01 RX ADMIN — ONDANSETRON 8 MG: 8 TABLET, ORALLY DISINTEGRATING ORAL at 10:54

## 2018-01-01 RX ADMIN — HYDROMORPHONE HYDROCHLORIDE 0.5 MG: 1 INJECTION, SOLUTION INTRAMUSCULAR; INTRAVENOUS; SUBCUTANEOUS at 11:41

## 2018-01-01 RX ADMIN — HYDROMORPHONE HYDROCHLORIDE 1 MG: 1 INJECTION, SOLUTION INTRAMUSCULAR; INTRAVENOUS; SUBCUTANEOUS at 17:04

## 2018-01-01 RX ADMIN — DEXAMETHASONE 4 MG: 4 TABLET ORAL at 07:55

## 2018-01-01 RX ADMIN — ONDANSETRON HYDROCHLORIDE 8 MG: 2 INJECTION, SOLUTION INTRAMUSCULAR; INTRAVENOUS at 00:43

## 2018-01-01 RX ADMIN — I.V. FAT EMULSION 250 ML: 20 EMULSION INTRAVENOUS at 21:24

## 2018-01-01 RX ADMIN — SODIUM CHLORIDE 1000 ML: 9 INJECTION, SOLUTION INTRAVENOUS at 22:42

## 2018-01-01 RX ADMIN — Medication 1 MG: at 09:41

## 2018-01-01 RX ADMIN — IOPAMIDOL 82 ML: 755 INJECTION, SOLUTION INTRAVENOUS at 13:15

## 2018-01-01 RX ADMIN — ONDANSETRON 8 MG: 8 TABLET, ORALLY DISINTEGRATING ORAL at 08:22

## 2018-01-01 RX ADMIN — Medication 0.5 MG: at 12:39

## 2018-01-01 RX ADMIN — HYDROMORPHONE HYDROCHLORIDE 2 MG: 1 INJECTION, SOLUTION INTRAMUSCULAR; INTRAVENOUS; SUBCUTANEOUS at 07:37

## 2018-01-01 RX ADMIN — LEUCOVORIN CALCIUM 650 MG: 500 INJECTION, POWDER, LYOPHILIZED, FOR SOLUTION INTRAMUSCULAR; INTRAVENOUS at 12:30

## 2018-01-01 RX ADMIN — ONDANSETRON HYDROCHLORIDE 8 MG: 8 TABLET, FILM COATED ORAL at 05:48

## 2018-01-01 RX ADMIN — ONDANSETRON 8 MG: 4 TABLET, ORALLY DISINTEGRATING ORAL at 01:47

## 2018-01-01 RX ADMIN — POTASSIUM CHLORIDE 40 MEQ: 1.5 POWDER, FOR SOLUTION ORAL at 13:57

## 2018-01-01 RX ADMIN — Medication 0.5 MG: at 07:57

## 2018-01-01 RX ADMIN — Medication 0.5 MG: at 16:46

## 2018-01-01 RX ADMIN — OXYCODONE HYDROCHLORIDE 20 MG: 10 TABLET ORAL at 11:08

## 2018-01-01 RX ADMIN — HYDROMORPHONE HYDROCHLORIDE 1 MG: 1 INJECTION, SOLUTION INTRAMUSCULAR; INTRAVENOUS; SUBCUTANEOUS at 09:41

## 2018-01-01 RX ADMIN — I.V. FAT EMULSION 250 ML: 20 EMULSION INTRAVENOUS at 20:39

## 2018-01-01 RX ADMIN — ONDANSETRON HYDROCHLORIDE 8 MG: 2 INJECTION, SOLUTION INTRAMUSCULAR; INTRAVENOUS at 00:03

## 2018-01-01 RX ADMIN — FENTANYL 1 PATCH: 12 PATCH TRANSDERMAL at 22:09

## 2018-01-01 RX ADMIN — Medication: at 13:40

## 2018-01-01 RX ADMIN — OXYCODONE HYDROCHLORIDE 20 MG: 20 TABLET, FILM COATED, EXTENDED RELEASE ORAL at 05:24

## 2018-01-01 RX ADMIN — HYDROMORPHONE HYDROCHLORIDE 1 MG: 1 INJECTION, SOLUTION INTRAMUSCULAR; INTRAVENOUS; SUBCUTANEOUS at 18:18

## 2018-01-01 RX ADMIN — DRONABINOL 5 MG: 5 CAPSULE ORAL at 16:24

## 2018-01-01 RX ADMIN — ONDANSETRON HYDROCHLORIDE 8 MG: 8 TABLET, FILM COATED ORAL at 13:48

## 2018-01-01 RX ADMIN — PIPERACILLIN SODIUM,TAZOBACTAM SODIUM 4.5 G: 4; .5 INJECTION, POWDER, FOR SOLUTION INTRAVENOUS at 06:17

## 2018-01-01 RX ADMIN — HYDROMORPHONE HYDROCHLORIDE 0.5 MG: 1 INJECTION, SOLUTION INTRAMUSCULAR; INTRAVENOUS; SUBCUTANEOUS at 10:10

## 2018-01-01 RX ADMIN — PHYTONADIONE: 1 INJECTION, EMULSION INTRAMUSCULAR; INTRAVENOUS; SUBCUTANEOUS at 20:03

## 2018-01-01 RX ADMIN — Medication: at 03:49

## 2018-01-01 RX ADMIN — ONDANSETRON 8 MG: 8 TABLET, ORALLY DISINTEGRATING ORAL at 07:51

## 2018-01-01 RX ADMIN — ONDANSETRON 8 MG: 8 TABLET, ORALLY DISINTEGRATING ORAL at 13:41

## 2018-01-01 RX ADMIN — SODIUM CHLORIDE 250 ML: 9 INJECTION, SOLUTION INTRAVENOUS at 12:42

## 2018-01-01 RX ADMIN — ONDANSETRON 8 MG: 4 TABLET, ORALLY DISINTEGRATING ORAL at 00:14

## 2018-01-01 RX ADMIN — SODIUM CHLORIDE 1000 ML: 9 INJECTION, SOLUTION INTRAVENOUS at 13:58

## 2018-01-01 RX ADMIN — MULTIPLE VITAMINS W/ MINERALS TAB 1 TABLET: TAB at 18:20

## 2018-01-01 RX ADMIN — CEFTRIAXONE 1 G: 1 INJECTION, POWDER, FOR SOLUTION INTRAMUSCULAR; INTRAVENOUS at 17:13

## 2018-01-01 RX ADMIN — Medication 0.5 MG: at 12:37

## 2018-01-01 RX ADMIN — METRONIDAZOLE 500 MG: 500 TABLET ORAL at 13:17

## 2018-01-01 RX ADMIN — ONDANSETRON HYDROCHLORIDE 8 MG: 8 TABLET, FILM COATED ORAL at 06:25

## 2018-01-01 RX ADMIN — PROCHLORPERAZINE EDISYLATE 10 MG: 5 INJECTION INTRAMUSCULAR; INTRAVENOUS at 23:55

## 2018-01-01 RX ADMIN — PANITUMUMAB 400 MG: 400 SOLUTION INTRAVENOUS at 13:28

## 2018-01-01 RX ADMIN — OXYCODONE HYDROCHLORIDE 20 MG: 10 TABLET ORAL at 02:37

## 2018-01-01 RX ADMIN — OLANZAPINE 5 MG: 2.5 TABLET, FILM COATED ORAL at 19:49

## 2018-01-01 RX ADMIN — OXYCODONE HYDROCHLORIDE 20 MG: 20 TABLET, FILM COATED, EXTENDED RELEASE ORAL at 16:24

## 2018-01-01 RX ADMIN — VANCOMYCIN HYDROCHLORIDE 1500 MG: 10 INJECTION, POWDER, LYOPHILIZED, FOR SOLUTION INTRAVENOUS at 20:43

## 2018-01-01 RX ADMIN — ONDANSETRON 4 MG: 2 INJECTION INTRAMUSCULAR; INTRAVENOUS at 07:16

## 2018-01-01 RX ADMIN — PHYTONADIONE: 1 INJECTION, EMULSION INTRAMUSCULAR; INTRAVENOUS; SUBCUTANEOUS at 20:35

## 2018-01-01 RX ADMIN — HYDROMORPHONE HYDROCHLORIDE 1 MG: 1 INJECTION, SOLUTION INTRAMUSCULAR; INTRAVENOUS; SUBCUTANEOUS at 23:13

## 2018-01-01 RX ADMIN — ENOXAPARIN SODIUM 40 MG: 100 INJECTION SUBCUTANEOUS at 13:40

## 2018-01-01 RX ADMIN — PIPERACILLIN SODIUM,TAZOBACTAM SODIUM 3.38 G: 3; .375 INJECTION, POWDER, FOR SOLUTION INTRAVENOUS at 09:18

## 2018-01-01 RX ADMIN — PIPERACILLIN SODIUM,TAZOBACTAM SODIUM 4.5 G: 4; .5 INJECTION, POWDER, FOR SOLUTION INTRAVENOUS at 06:43

## 2018-01-01 RX ADMIN — PHYTONADIONE: 1 INJECTION, EMULSION INTRAMUSCULAR; INTRAVENOUS; SUBCUTANEOUS at 19:40

## 2018-01-01 RX ADMIN — HYDROMORPHONE HYDROCHLORIDE 1.5 MG: 1 INJECTION, SOLUTION INTRAMUSCULAR; INTRAVENOUS; SUBCUTANEOUS at 13:38

## 2018-01-01 RX ADMIN — MIDAZOLAM 2 MG: 1 INJECTION INTRAMUSCULAR; INTRAVENOUS at 13:56

## 2018-01-01 RX ADMIN — ONDANSETRON 8 MG: 8 TABLET, ORALLY DISINTEGRATING ORAL at 13:25

## 2018-01-01 RX ADMIN — HYDROMORPHONE HYDROCHLORIDE 0.5 MG: 1 INJECTION, SOLUTION INTRAMUSCULAR; INTRAVENOUS; SUBCUTANEOUS at 16:24

## 2018-01-01 RX ADMIN — SODIUM CHLORIDE 2000 ML: 9 INJECTION, SOLUTION INTRAVENOUS at 09:44

## 2018-01-01 RX ADMIN — CYANOCOBALAMIN TAB 1000 MCG 1000 MCG: 1000 TAB at 08:26

## 2018-01-01 RX ADMIN — DEXAMETHASONE SODIUM PHOSPHATE: 10 INJECTION, SOLUTION INTRAMUSCULAR; INTRAVENOUS at 09:23

## 2018-01-01 RX ADMIN — ONDANSETRON 8 MG: 2 INJECTION, SOLUTION INTRAMUSCULAR; INTRAVENOUS at 11:30

## 2018-01-01 RX ADMIN — SODIUM CHLORIDE 1000 ML: 9 INJECTION, SOLUTION INTRAVENOUS at 08:16

## 2018-01-01 RX ADMIN — OXYCODONE HYDROCHLORIDE 5 MG: 5 TABLET ORAL at 20:48

## 2018-01-01 RX ADMIN — IOPAMIDOL 91 ML: 755 INJECTION, SOLUTION INTRAVENOUS at 10:02

## 2018-01-01 RX ADMIN — SODIUM CHLORIDE: 9 INJECTION, SOLUTION INTRAVENOUS at 21:51

## 2018-01-01 RX ADMIN — SODIUM CHLORIDE 1000 ML: 9 INJECTION, SOLUTION INTRAVENOUS at 01:46

## 2018-01-01 RX ADMIN — ENOXAPARIN SODIUM 40 MG: 40 INJECTION SUBCUTANEOUS at 08:08

## 2018-01-01 RX ADMIN — SODIUM CHLORIDE 1000 ML: 9 INJECTION, SOLUTION INTRAVENOUS at 08:54

## 2018-01-01 RX ADMIN — PANTOPRAZOLE SODIUM 40 MG: 40 INJECTION, POWDER, FOR SOLUTION INTRAVENOUS at 09:06

## 2018-01-01 RX ADMIN — Medication 2 MG: at 17:21

## 2018-01-01 RX ADMIN — OXYCODONE HYDROCHLORIDE 10 MG: 5 TABLET ORAL at 23:12

## 2018-01-01 RX ADMIN — FENTANYL CITRATE 100 MCG: 50 INJECTION, SOLUTION INTRAMUSCULAR; INTRAVENOUS at 16:26

## 2018-01-01 RX ADMIN — PIPERACILLIN SODIUM AND TAZOBACTAM SODIUM 3.38 G: 36; 4.5 INJECTION, POWDER, FOR SOLUTION INTRAVENOUS at 13:36

## 2018-01-01 RX ADMIN — HYDROMORPHONE HYDROCHLORIDE 0.5 MG: 1 INJECTION, SOLUTION INTRAMUSCULAR; INTRAVENOUS; SUBCUTANEOUS at 12:00

## 2018-01-01 RX ADMIN — ENOXAPARIN SODIUM 40 MG: 40 INJECTION SUBCUTANEOUS at 18:20

## 2018-01-01 RX ADMIN — FERROUS SULFATE 325 MG: 325 TABLET, FILM COATED ORAL at 08:21

## 2018-01-01 RX ADMIN — OXYCODONE HYDROCHLORIDE 10 MG: 5 TABLET ORAL at 08:01

## 2018-01-01 RX ADMIN — Medication: at 13:17

## 2018-01-01 RX ADMIN — OXYCODONE HYDROCHLORIDE 10 MG: 10 TABLET, FILM COATED, EXTENDED RELEASE ORAL at 10:51

## 2018-01-01 RX ADMIN — PHYTONADIONE: 1 INJECTION, EMULSION INTRAMUSCULAR; INTRAVENOUS; SUBCUTANEOUS at 20:10

## 2018-01-01 RX ADMIN — Medication 20 MG: at 23:23

## 2018-01-01 RX ADMIN — SODIUM CHLORIDE 250 ML: 9 INJECTION, SOLUTION INTRAVENOUS at 13:20

## 2018-01-01 RX ADMIN — SODIUM CHLORIDE 1000 ML: 9 INJECTION, SOLUTION INTRAVENOUS at 18:29

## 2018-01-01 RX ADMIN — SODIUM CHLORIDE: 9 INJECTION, SOLUTION INTRAVENOUS at 23:08

## 2018-01-01 RX ADMIN — FERROUS SULFATE 325 MG: 325 TABLET, FILM COATED ORAL at 13:37

## 2018-01-01 RX ADMIN — SODIUM CHLORIDE: 9 INJECTION, SOLUTION INTRAVENOUS at 05:30

## 2018-01-01 RX ADMIN — MIDAZOLAM 2 MG: 1 INJECTION INTRAMUSCULAR; INTRAVENOUS at 11:56

## 2018-01-01 RX ADMIN — PHYTONADIONE: 1 INJECTION, EMULSION INTRAMUSCULAR; INTRAVENOUS; SUBCUTANEOUS at 20:15

## 2018-01-01 RX ADMIN — METHADONE HYDROCHLORIDE 5 MG: 5 TABLET ORAL at 19:59

## 2018-01-01 RX ADMIN — HYDROMORPHONE HYDROCHLORIDE 2 MG: 2 INJECTION INTRAMUSCULAR; INTRAVENOUS; SUBCUTANEOUS at 14:58

## 2018-01-01 RX ADMIN — PANTOPRAZOLE SODIUM 40 MG: 40 INJECTION, POWDER, FOR SOLUTION INTRAVENOUS at 07:55

## 2018-01-01 RX ADMIN — ENOXAPARIN SODIUM 40 MG: 40 INJECTION SUBCUTANEOUS at 17:07

## 2018-01-01 RX ADMIN — POTASSIUM CHLORIDE 20 MEQ: 400 INJECTION, SOLUTION INTRAVENOUS at 22:58

## 2018-01-01 RX ADMIN — DEXAMETHASONE 8 MG: 4 TABLET ORAL at 07:54

## 2018-01-01 RX ADMIN — POTASSIUM CHLORIDE 10 MEQ: 10 INJECTION, SOLUTION INTRAVENOUS at 14:43

## 2018-01-01 RX ADMIN — POTASSIUM CHLORIDE 20 MEQ: 400 INJECTION, SOLUTION INTRAVENOUS at 09:37

## 2018-01-01 RX ADMIN — FENTANYL 1 PATCH: 75 PATCH, EXTENDED RELEASE TRANSDERMAL at 15:43

## 2018-01-01 RX ADMIN — ENOXAPARIN SODIUM 40 MG: 40 INJECTION SUBCUTANEOUS at 10:28

## 2018-01-01 RX ADMIN — PROCHLORPERAZINE MALEATE 10 MG: 10 TABLET, FILM COATED ORAL at 06:32

## 2018-01-01 RX ADMIN — SODIUM CHLORIDE: 9 INJECTION, SOLUTION INTRAVENOUS at 01:52

## 2018-01-01 RX ADMIN — HEPARIN SODIUM 5000 UNITS: 5000 INJECTION, SOLUTION INTRAVENOUS; SUBCUTANEOUS at 19:40

## 2018-01-01 RX ADMIN — BENZOCAINE AND MENTHOL 1 LOZENGE: 15; 3.6 LOZENGE ORAL at 22:31

## 2018-01-01 RX ADMIN — ONDANSETRON 8 MG: 8 TABLET, ORALLY DISINTEGRATING ORAL at 13:35

## 2018-01-01 RX ADMIN — PANTOPRAZOLE SODIUM 40 MG: 40 TABLET, DELAYED RELEASE ORAL at 08:26

## 2018-01-01 RX ADMIN — Medication: at 23:30

## 2018-01-01 RX ADMIN — I.V. FAT EMULSION 250 ML: 20 EMULSION INTRAVENOUS at 20:54

## 2018-01-01 RX ADMIN — SODIUM CHLORIDE 1000 ML: 9 INJECTION, SOLUTION INTRAVENOUS at 13:25

## 2018-01-01 RX ADMIN — FLUOROURACIL 745 MG: 50 INJECTION, SOLUTION INTRAVENOUS at 14:37

## 2018-01-01 RX ADMIN — PROPOFOL 100 MG: 10 INJECTION, EMULSION INTRAVENOUS at 16:29

## 2018-01-01 RX ADMIN — I.V. FAT EMULSION 250 ML: 20 EMULSION INTRAVENOUS at 10:15

## 2018-01-01 RX ADMIN — OXYCODONE HYDROCHLORIDE 10 MG: 10 TABLET, FILM COATED, EXTENDED RELEASE ORAL at 01:50

## 2018-01-01 RX ADMIN — SODIUM CHLORIDE, POTASSIUM CHLORIDE, SODIUM LACTATE AND CALCIUM CHLORIDE: 600; 310; 30; 20 INJECTION, SOLUTION INTRAVENOUS at 11:56

## 2018-01-01 RX ADMIN — OXALIPLATIN 130 MG: 100 INJECTION, SOLUTION, CONCENTRATE INTRAVENOUS at 12:15

## 2018-01-01 RX ADMIN — SODIUM CHLORIDE, POTASSIUM CHLORIDE, SODIUM LACTATE AND CALCIUM CHLORIDE: 600; 310; 30; 20 INJECTION, SOLUTION INTRAVENOUS at 16:30

## 2018-01-01 RX ADMIN — OLANZAPINE 5 MG: 2.5 TABLET, FILM COATED ORAL at 21:38

## 2018-01-01 RX ADMIN — ONDANSETRON 8 MG: 4 TABLET, ORALLY DISINTEGRATING ORAL at 15:08

## 2018-01-01 RX ADMIN — OXYCODONE HYDROCHLORIDE 10 MG: 5 TABLET ORAL at 04:56

## 2018-01-01 RX ADMIN — PANTOPRAZOLE SODIUM 40 MG: 40 INJECTION, POWDER, FOR SOLUTION INTRAVENOUS at 09:08

## 2018-01-01 RX ADMIN — Medication: at 16:12

## 2018-01-01 RX ADMIN — PANTOPRAZOLE SODIUM 40 MG: 40 INJECTION, POWDER, FOR SOLUTION INTRAVENOUS at 08:09

## 2018-01-01 RX ADMIN — SODIUM CHLORIDE, POTASSIUM CHLORIDE, SODIUM LACTATE AND CALCIUM CHLORIDE: 600; 310; 30; 20 INJECTION, SOLUTION INTRAVENOUS at 15:43

## 2018-01-01 RX ADMIN — PHENYLEPHRINE HYDROCHLORIDE 100 MCG: 10 INJECTION, SOLUTION INTRAMUSCULAR; INTRAVENOUS; SUBCUTANEOUS at 12:14

## 2018-01-01 RX ADMIN — SODIUM CHLORIDE, PRESERVATIVE FREE 5 ML: 5 INJECTION INTRAVENOUS at 10:06

## 2018-01-01 RX ADMIN — I.V. FAT EMULSION 250 ML: 20 EMULSION INTRAVENOUS at 20:21

## 2018-01-01 RX ADMIN — OXYCODONE HYDROCHLORIDE 10 MG: 5 TABLET ORAL at 15:31

## 2018-01-01 RX ADMIN — Medication: at 05:55

## 2018-01-01 RX ADMIN — HYDROMORPHONE HYDROCHLORIDE 0.5 MG: 1 INJECTION, SOLUTION INTRAMUSCULAR; INTRAVENOUS; SUBCUTANEOUS at 07:50

## 2018-01-01 RX ADMIN — SODIUM CHLORIDE: 9 INJECTION, SOLUTION INTRAVENOUS at 14:46

## 2018-01-01 RX ADMIN — ONDANSETRON 8 MG: 2 INJECTION, SOLUTION INTRAMUSCULAR; INTRAVENOUS at 12:01

## 2018-01-01 RX ADMIN — HUMAN ALBUMIN MICROSPHERES AND PERFLUTREN 6 ML: 10; .22 INJECTION, SOLUTION INTRAVENOUS at 10:45

## 2018-01-01 RX ADMIN — HYDROMORPHONE HYDROCHLORIDE 0.5 MG: 1 INJECTION, SOLUTION INTRAMUSCULAR; INTRAVENOUS; SUBCUTANEOUS at 12:47

## 2018-01-01 RX ADMIN — METHADONE HYDROCHLORIDE 5 MG: 5 TABLET ORAL at 07:55

## 2018-01-01 RX ADMIN — ONDANSETRON 8 MG: 8 TABLET, ORALLY DISINTEGRATING ORAL at 16:46

## 2018-01-01 RX ADMIN — PIPERACILLIN SODIUM AND TAZOBACTAM SODIUM 4.5 G: 36; 4.5 INJECTION, POWDER, FOR SOLUTION INTRAVENOUS at 12:32

## 2018-01-01 RX ADMIN — OXYCODONE HYDROCHLORIDE 10 MG: 10 TABLET, FILM COATED, EXTENDED RELEASE ORAL at 23:03

## 2018-01-01 RX ADMIN — FENTANYL CITRATE 100 MCG: 50 INJECTION, SOLUTION INTRAMUSCULAR; INTRAVENOUS at 13:56

## 2018-01-01 RX ADMIN — ONDANSETRON 8 MG: 8 TABLET, ORALLY DISINTEGRATING ORAL at 09:50

## 2018-01-01 RX ADMIN — ATROPINE SULFATE 0.4 MG: 0.4 INJECTION, SOLUTION INTRAMUSCULAR; INTRAVENOUS; SUBCUTANEOUS at 15:38

## 2018-01-01 RX ADMIN — HYDROMORPHONE HYDROCHLORIDE 1 MG: 1 INJECTION, SOLUTION INTRAMUSCULAR; INTRAVENOUS; SUBCUTANEOUS at 14:32

## 2018-01-01 RX ADMIN — SODIUM CHLORIDE, PRESERVATIVE FREE 5 ML: 5 INJECTION INTRAVENOUS at 16:39

## 2018-01-01 RX ADMIN — PHYTONADIONE: 1 INJECTION, EMULSION INTRAMUSCULAR; INTRAVENOUS; SUBCUTANEOUS at 20:39

## 2018-01-01 RX ADMIN — ALUMINUM HYDROXIDE, MAGNESIUM HYDROXIDE, AND DIMETHICONE 30 ML: 400; 400; 40 SUSPENSION ORAL at 10:53

## 2018-01-01 RX ADMIN — HYDROMORPHONE HYDROCHLORIDE 1 MG: 1 INJECTION, SOLUTION INTRAMUSCULAR; INTRAVENOUS; SUBCUTANEOUS at 19:49

## 2018-01-01 RX ADMIN — HYDROMORPHONE HYDROCHLORIDE 1 MG: 1 INJECTION, SOLUTION INTRAMUSCULAR; INTRAVENOUS; SUBCUTANEOUS at 18:19

## 2018-01-01 RX ADMIN — OXYCODONE HYDROCHLORIDE 5 MG: 5 TABLET ORAL at 05:51

## 2018-01-01 RX ADMIN — POTASSIUM CHLORIDE 10 MEQ: 10 INJECTION, SOLUTION INTRAVENOUS at 18:17

## 2018-01-01 RX ADMIN — METRONIDAZOLE 500 MG: 500 INJECTION, SOLUTION INTRAVENOUS at 22:50

## 2018-01-01 RX ADMIN — HEPARIN SODIUM 5000 UNITS: 5000 INJECTION, SOLUTION INTRAVENOUS; SUBCUTANEOUS at 05:06

## 2018-01-01 RX ADMIN — FERROUS SULFATE 325 MG: 325 TABLET, FILM COATED ORAL at 17:39

## 2018-01-01 RX ADMIN — Medication 0.5 MG: at 09:28

## 2018-01-01 RX ADMIN — PHENYLEPHRINE HYDROCHLORIDE 100 MCG: 10 INJECTION, SOLUTION INTRAMUSCULAR; INTRAVENOUS; SUBCUTANEOUS at 12:16

## 2018-01-01 RX ADMIN — OXYCODONE HYDROCHLORIDE 20 MG: 20 TABLET, FILM COATED, EXTENDED RELEASE ORAL at 22:09

## 2018-01-01 RX ADMIN — ONDANSETRON 8 MG: 8 TABLET, ORALLY DISINTEGRATING ORAL at 21:58

## 2018-01-01 RX ADMIN — OXYCODONE HYDROCHLORIDE 10 MG: 5 TABLET ORAL at 08:09

## 2018-01-01 RX ADMIN — OLANZAPINE 5 MG: 2.5 TABLET, FILM COATED ORAL at 11:42

## 2018-01-01 RX ADMIN — SUGAMMADEX 150 MG: 100 INJECTION, SOLUTION INTRAVENOUS at 17:31

## 2018-01-01 RX ADMIN — SODIUM CHLORIDE: 9 INJECTION, SOLUTION INTRAVENOUS at 07:19

## 2018-01-01 RX ADMIN — HYDROMORPHONE HYDROCHLORIDE 1 MG: 1 INJECTION, SOLUTION INTRAMUSCULAR; INTRAVENOUS; SUBCUTANEOUS at 11:15

## 2018-01-01 RX ADMIN — PIPERACILLIN SODIUM,TAZOBACTAM SODIUM 4.5 G: 4; .5 INJECTION, POWDER, FOR SOLUTION INTRAVENOUS at 06:42

## 2018-01-01 RX ADMIN — ONDANSETRON 8 MG: 8 TABLET, ORALLY DISINTEGRATING ORAL at 16:25

## 2018-01-01 RX ADMIN — BENZOCAINE AND MENTHOL 1 LOZENGE: 15; 3.6 LOZENGE ORAL at 19:04

## 2018-01-01 RX ADMIN — ONDANSETRON 8 MG: 8 TABLET, ORALLY DISINTEGRATING ORAL at 17:28

## 2018-01-01 RX ADMIN — ONDANSETRON 8 MG: 8 TABLET, ORALLY DISINTEGRATING ORAL at 22:53

## 2018-01-01 RX ADMIN — OXYCODONE HYDROCHLORIDE 10 MG: 5 TABLET ORAL at 01:48

## 2018-01-01 RX ADMIN — IOPAMIDOL 93 ML: 755 INJECTION, SOLUTION INTRAVENOUS at 13:03

## 2018-01-01 RX ADMIN — SODIUM CHLORIDE, POTASSIUM CHLORIDE, SODIUM LACTATE AND CALCIUM CHLORIDE: 600; 310; 30; 20 INJECTION, SOLUTION INTRAVENOUS at 10:40

## 2018-01-01 RX ADMIN — Medication 20 MG: at 20:21

## 2018-01-01 RX ADMIN — FERROUS SULFATE 325 MG: 325 TABLET, FILM COATED ORAL at 08:00

## 2018-01-01 RX ADMIN — OMEPRAZOLE 20 MG: 20 CAPSULE, DELAYED RELEASE ORAL at 08:34

## 2018-01-01 RX ADMIN — METRONIDAZOLE 500 MG: 500 INJECTION, SOLUTION INTRAVENOUS at 16:16

## 2018-01-01 RX ADMIN — PIPERACILLIN SODIUM,TAZOBACTAM SODIUM 4.5 G: 4; .5 INJECTION, POWDER, FOR SOLUTION INTRAVENOUS at 11:59

## 2018-01-01 RX ADMIN — HYDROMORPHONE HYDROCHLORIDE 0.5 MG: 1 INJECTION, SOLUTION INTRAMUSCULAR; INTRAVENOUS; SUBCUTANEOUS at 13:41

## 2018-01-01 RX ADMIN — OMEPRAZOLE 20 MG: 20 CAPSULE, DELAYED RELEASE ORAL at 13:47

## 2018-01-01 RX ADMIN — VANCOMYCIN HYDROCHLORIDE 1500 MG: 10 INJECTION, POWDER, LYOPHILIZED, FOR SOLUTION INTRAVENOUS at 07:55

## 2018-01-01 RX ADMIN — SODIUM CHLORIDE 1000 ML: 9 INJECTION, SOLUTION INTRAVENOUS at 08:52

## 2018-01-01 RX ADMIN — OMEPRAZOLE 20 MG: 20 CAPSULE, DELAYED RELEASE ORAL at 08:00

## 2018-01-01 RX ADMIN — SODIUM CHLORIDE, PRESERVATIVE FREE 5 ML: 5 INJECTION INTRAVENOUS at 15:18

## 2018-01-01 RX ADMIN — HYDROMORPHONE HYDROCHLORIDE 1 MG: 1 INJECTION, SOLUTION INTRAMUSCULAR; INTRAVENOUS; SUBCUTANEOUS at 00:37

## 2018-01-01 RX ADMIN — OLANZAPINE 5 MG: 2.5 TABLET, FILM COATED ORAL at 22:07

## 2018-01-01 RX ADMIN — ONDANSETRON HYDROCHLORIDE 8 MG: 2 INJECTION, SOLUTION INTRAMUSCULAR; INTRAVENOUS at 17:02

## 2018-01-01 RX ADMIN — FENTANYL CITRATE 100 MCG: 50 INJECTION, SOLUTION INTRAMUSCULAR; INTRAVENOUS at 11:33

## 2018-01-01 RX ADMIN — ONDANSETRON HYDROCHLORIDE 4 MG: 2 INJECTION, SOLUTION INTRAMUSCULAR; INTRAVENOUS at 09:00

## 2018-01-01 RX ADMIN — HYDROMORPHONE HYDROCHLORIDE 0.5 MG: 1 INJECTION, SOLUTION INTRAMUSCULAR; INTRAVENOUS; SUBCUTANEOUS at 22:25

## 2018-01-01 RX ADMIN — I.V. FAT EMULSION 250 ML: 20 EMULSION INTRAVENOUS at 20:09

## 2018-01-01 RX ADMIN — OXYCODONE HYDROCHLORIDE 10 MG: 5 TABLET ORAL at 05:35

## 2018-01-01 RX ADMIN — DRONABINOL 5 MG: 5 CAPSULE ORAL at 08:26

## 2018-01-01 RX ADMIN — FENTANYL 1 PATCH: 25 PATCH, EXTENDED RELEASE TRANSDERMAL at 17:36

## 2018-01-01 RX ADMIN — Medication 0.5 MG: at 00:52

## 2018-01-01 RX ADMIN — OXALIPLATIN 130 MG: 100 INJECTION, SOLUTION, CONCENTRATE INTRAVENOUS at 11:02

## 2018-01-01 RX ADMIN — CEFTRIAXONE 1 G: 1 INJECTION, POWDER, FOR SOLUTION INTRAMUSCULAR; INTRAVENOUS at 14:05

## 2018-01-01 RX ADMIN — OMEPRAZOLE 20 MG: 20 CAPSULE, DELAYED RELEASE ORAL at 08:12

## 2018-01-01 RX ADMIN — Medication 2 MG: at 02:23

## 2018-01-01 RX ADMIN — FENTANYL 1 PATCH: 25 PATCH, EXTENDED RELEASE TRANSDERMAL at 13:37

## 2018-01-01 RX ADMIN — HYDROMORPHONE HYDROCHLORIDE 0.5 MG: 1 INJECTION, SOLUTION INTRAMUSCULAR; INTRAVENOUS; SUBCUTANEOUS at 00:50

## 2018-01-01 RX ADMIN — HEPARIN SODIUM 5000 UNITS: 5000 INJECTION, SOLUTION INTRAVENOUS; SUBCUTANEOUS at 03:04

## 2018-01-01 RX ADMIN — SODIUM CHLORIDE 500 ML: 9 INJECTION, SOLUTION INTRAVENOUS at 06:33

## 2018-01-01 RX ADMIN — ONDANSETRON 8 MG: 2 INJECTION INTRAMUSCULAR; INTRAVENOUS at 13:46

## 2018-01-01 RX ADMIN — OXYCODONE HYDROCHLORIDE 20 MG: 20 TABLET, FILM COATED, EXTENDED RELEASE ORAL at 18:20

## 2018-01-01 RX ADMIN — OXALIPLATIN 130 MG: 100 INJECTION, SOLUTION, CONCENTRATE INTRAVENOUS at 11:29

## 2018-01-01 RX ADMIN — HEPARIN SODIUM 5000 UNITS: 5000 INJECTION, SOLUTION INTRAVENOUS; SUBCUTANEOUS at 19:45

## 2018-01-01 RX ADMIN — ONDANSETRON 8 MG: 2 INJECTION INTRAMUSCULAR; INTRAVENOUS at 10:53

## 2018-01-01 RX ADMIN — ONDANSETRON 8 MG: 8 TABLET, ORALLY DISINTEGRATING ORAL at 17:39

## 2018-01-01 RX ADMIN — METRONIDAZOLE 500 MG: 500 INJECTION, SOLUTION INTRAVENOUS at 11:24

## 2018-01-01 RX ADMIN — SODIUM CHLORIDE 500 ML: 9 INJECTION, SOLUTION INTRAVENOUS at 14:06

## 2018-01-01 RX ADMIN — ONDANSETRON 8 MG: 8 TABLET, ORALLY DISINTEGRATING ORAL at 07:55

## 2018-01-01 RX ADMIN — Medication 1 MG: at 09:01

## 2018-01-01 RX ADMIN — ONDANSETRON 8 MG: 8 TABLET, ORALLY DISINTEGRATING ORAL at 21:38

## 2018-01-01 RX ADMIN — LORAZEPAM 0.5 MG: 0.5 TABLET ORAL at 09:40

## 2018-01-01 RX ADMIN — Medication 20 MG: at 16:17

## 2018-01-01 RX ADMIN — ONDANSETRON 8 MG: 8 TABLET, ORALLY DISINTEGRATING ORAL at 08:40

## 2018-01-01 RX ADMIN — OXYCODONE HYDROCHLORIDE 10 MG: 10 TABLET, FILM COATED, EXTENDED RELEASE ORAL at 21:34

## 2018-01-01 RX ADMIN — SODIUM CHLORIDE, PRESERVATIVE FREE 5 ML: 5 INJECTION INTRAVENOUS at 13:17

## 2018-01-01 RX ADMIN — I.V. FAT EMULSION 250 ML: 20 EMULSION INTRAVENOUS at 20:50

## 2018-01-01 RX ADMIN — SODIUM CHLORIDE: 9 INJECTION, SOLUTION INTRAVENOUS at 00:34

## 2018-01-01 RX ADMIN — METRONIDAZOLE 500 MG: 500 TABLET ORAL at 06:22

## 2018-01-01 RX ADMIN — ASCORBIC ACID, VITAMIN A PALMITATE, CHOLECALCIFEROL, THIAMINE HYDROCHLORIDE, RIBOFLAVIN-5 PHOSPHATE SODIUM, PYRIDOXINE HYDROCHLORIDE, NIACINAMIDE, DEXPANTHENOL, ALPHA-TOCOPHEROL ACETATE, VITAMIN K1, FOLIC ACID, BIOTIN, CYANOCOBALAMIN: 200; 3300; 200; 6; 3.6; 6; 40; 15; 10; 150; 600; 60; 5 INJECTION, SOLUTION INTRAVENOUS at 20:49

## 2018-01-01 RX ADMIN — ONDANSETRON 8 MG: 8 TABLET, ORALLY DISINTEGRATING ORAL at 13:56

## 2018-01-01 RX ADMIN — FENTANYL 3 PATCH: 100 PATCH, EXTENDED RELEASE TRANSDERMAL at 11:50

## 2018-01-01 RX ADMIN — HYDROMORPHONE HYDROCHLORIDE 1 MG: 1 INJECTION, SOLUTION INTRAMUSCULAR; INTRAVENOUS; SUBCUTANEOUS at 11:08

## 2018-01-01 RX ADMIN — ONDANSETRON 8 MG: 2 INJECTION INTRAMUSCULAR; INTRAVENOUS at 11:57

## 2018-01-01 RX ADMIN — ONDANSETRON 8 MG: 8 TABLET, ORALLY DISINTEGRATING ORAL at 01:03

## 2018-01-01 RX ADMIN — METHADONE HYDROCHLORIDE 5 MG: 5 TABLET ORAL at 13:20

## 2018-01-01 RX ADMIN — OXYCODONE HYDROCHLORIDE 10 MG: 5 TABLET ORAL at 19:31

## 2018-01-01 RX ADMIN — OXYCODONE HYDROCHLORIDE 10 MG: 5 TABLET ORAL at 04:22

## 2018-01-01 RX ADMIN — OXYCODONE HYDROCHLORIDE 20 MG: 10 TABLET ORAL at 22:18

## 2018-01-01 RX ADMIN — BENZOCAINE AND MENTHOL 1 LOZENGE: 15; 3.6 LOZENGE ORAL at 19:44

## 2018-01-01 RX ADMIN — HEPARIN, PORCINE (PF) 10 UNIT/ML INTRAVENOUS SYRINGE 5 ML: at 08:40

## 2018-01-01 RX ADMIN — OMEPRAZOLE 20 MG: 20 CAPSULE, DELAYED RELEASE ORAL at 08:21

## 2018-01-01 RX ADMIN — LEUCOVORIN CALCIUM 650 MG: 200 INJECTION, POWDER, LYOPHILIZED, FOR SOLUTION INTRAMUSCULAR; INTRAVENOUS at 14:11

## 2018-01-01 RX ADMIN — IOPAMIDOL 92 ML: 755 INJECTION, SOLUTION INTRAVENOUS at 06:32

## 2018-01-01 RX ADMIN — ONDANSETRON HYDROCHLORIDE 8 MG: 2 INJECTION, SOLUTION INTRAMUSCULAR; INTRAVENOUS at 00:53

## 2018-01-01 RX ADMIN — Medication: at 23:14

## 2018-01-01 RX ADMIN — METRONIDAZOLE 500 MG: 500 TABLET ORAL at 22:18

## 2018-01-01 RX ADMIN — OXALIPLATIN 130 MG: 100 INJECTION, SOLUTION, CONCENTRATE INTRAVENOUS at 12:14

## 2018-01-01 RX ADMIN — SODIUM CHLORIDE, PRESERVATIVE FREE 71 ML: 5 INJECTION INTRAVENOUS at 13:15

## 2018-01-01 RX ADMIN — ONDANSETRON 8 MG: 8 TABLET, ORALLY DISINTEGRATING ORAL at 10:43

## 2018-01-01 RX ADMIN — PHYTONADIONE: 1 INJECTION, EMULSION INTRAMUSCULAR; INTRAVENOUS; SUBCUTANEOUS at 20:16

## 2018-01-01 RX ADMIN — Medication: at 15:06

## 2018-01-01 RX ADMIN — ONDANSETRON HYDROCHLORIDE 8 MG: 2 INJECTION, SOLUTION INTRAMUSCULAR; INTRAVENOUS at 08:36

## 2018-01-01 RX ADMIN — PROCHLORPERAZINE EDISYLATE 10 MG: 5 INJECTION INTRAMUSCULAR; INTRAVENOUS at 08:01

## 2018-01-01 RX ADMIN — I.V. FAT EMULSION 250 ML: 20 EMULSION INTRAVENOUS at 21:34

## 2018-01-01 RX ADMIN — POTASSIUM CHLORIDE 20 MEQ: 400 INJECTION, SOLUTION INTRAVENOUS at 21:51

## 2018-01-01 RX ADMIN — PROCHLORPERAZINE EDISYLATE 10 MG: 5 INJECTION INTRAMUSCULAR; INTRAVENOUS at 22:59

## 2018-01-01 RX ADMIN — OXYCODONE HYDROCHLORIDE 20 MG: 20 TABLET, FILM COATED, EXTENDED RELEASE ORAL at 11:08

## 2018-01-01 RX ADMIN — IRINOTECAN HYDROCHLORIDE 340 MG: 20 INJECTION, SOLUTION INTRAVENOUS at 14:39

## 2018-01-01 RX ADMIN — Medication 20 MG: at 02:35

## 2018-01-01 RX ADMIN — PANTOPRAZOLE SODIUM 40 MG: 40 TABLET, DELAYED RELEASE ORAL at 07:51

## 2018-01-01 RX ADMIN — I.V. FAT EMULSION 250 ML: 20 EMULSION INTRAVENOUS at 20:27

## 2018-01-01 RX ADMIN — SODIUM CHLORIDE 1000 ML: 9 INJECTION, SOLUTION INTRAVENOUS at 13:39

## 2018-01-01 RX ADMIN — SODIUM CHLORIDE: 9 INJECTION, SOLUTION INTRAVENOUS at 00:56

## 2018-01-01 RX ADMIN — OLANZAPINE 5 MG: 2.5 TABLET, FILM COATED ORAL at 22:43

## 2018-01-01 RX ADMIN — ENOXAPARIN SODIUM 40 MG: 40 INJECTION SUBCUTANEOUS at 12:01

## 2018-01-01 RX ADMIN — Medication 300 MG: at 11:46

## 2018-01-01 RX ADMIN — HYDROMORPHONE HYDROCHLORIDE 1 MG: 1 INJECTION, SOLUTION INTRAMUSCULAR; INTRAVENOUS; SUBCUTANEOUS at 20:32

## 2018-01-01 RX ADMIN — PIPERACILLIN SODIUM,TAZOBACTAM SODIUM 4.5 G: 4; .5 INJECTION, POWDER, FOR SOLUTION INTRAVENOUS at 23:32

## 2018-01-01 RX ADMIN — OXYCODONE HYDROCHLORIDE 5 MG: 5 TABLET ORAL at 14:33

## 2018-01-01 RX ADMIN — HYDROMORPHONE HYDROCHLORIDE 0.5 MG: 1 INJECTION, SOLUTION INTRAMUSCULAR; INTRAVENOUS; SUBCUTANEOUS at 07:18

## 2018-01-01 RX ADMIN — Medication 7.5 MG: at 08:50

## 2018-01-01 RX ADMIN — HEPARIN, PORCINE (PF) 10 UNIT/ML INTRAVENOUS SYRINGE 5 ML: at 09:25

## 2018-01-01 RX ADMIN — PHYTONADIONE: 1 INJECTION, EMULSION INTRAMUSCULAR; INTRAVENOUS; SUBCUTANEOUS at 23:19

## 2018-01-01 RX ADMIN — HYDROMORPHONE HYDROCHLORIDE 0.5 MG: 1 INJECTION, SOLUTION INTRAMUSCULAR; INTRAVENOUS; SUBCUTANEOUS at 00:35

## 2018-01-01 RX ADMIN — Medication 20 MG: at 23:36

## 2018-01-01 RX ADMIN — Medication: at 09:38

## 2018-01-01 RX ADMIN — ONDANSETRON 8 MG: 8 TABLET, ORALLY DISINTEGRATING ORAL at 22:43

## 2018-01-01 RX ADMIN — SODIUM CHLORIDE, POTASSIUM CHLORIDE, SODIUM LACTATE AND CALCIUM CHLORIDE 1000 ML: 600; 310; 30; 20 INJECTION, SOLUTION INTRAVENOUS at 12:36

## 2018-01-01 RX ADMIN — HYDROMORPHONE HYDROCHLORIDE 1 MG: 1 INJECTION, SOLUTION INTRAMUSCULAR; INTRAVENOUS; SUBCUTANEOUS at 23:10

## 2018-01-01 RX ADMIN — Medication 300 MG: at 09:28

## 2018-01-01 RX ADMIN — DRONABINOL 5 MG: 5 CAPSULE ORAL at 18:20

## 2018-01-01 RX ADMIN — HEPARIN SODIUM 5000 UNITS: 5000 INJECTION, SOLUTION INTRAVENOUS; SUBCUTANEOUS at 02:52

## 2018-01-01 RX ADMIN — DEXAMETHASONE SODIUM PHOSPHATE: 10 INJECTION, SOLUTION INTRAMUSCULAR; INTRAVENOUS at 13:21

## 2018-01-01 RX ADMIN — PHYTONADIONE: 1 INJECTION, EMULSION INTRAMUSCULAR; INTRAVENOUS; SUBCUTANEOUS at 21:34

## 2018-01-01 RX ADMIN — DEXTROSE MONOHYDRATE 250 ML: 50 INJECTION, SOLUTION INTRAVENOUS at 11:36

## 2018-01-01 RX ADMIN — OXYCODONE HYDROCHLORIDE 10 MG: 5 TABLET ORAL at 17:26

## 2018-01-01 RX ADMIN — DEXAMETHASONE 8 MG: 4 TABLET ORAL at 11:56

## 2018-01-01 RX ADMIN — LIDOCAINE HYDROCHLORIDE 5 ML: 10 INJECTION, SOLUTION INFILTRATION; PERINEURAL at 12:36

## 2018-01-01 RX ADMIN — ONDANSETRON HYDROCHLORIDE 8 MG: 2 INJECTION, SOLUTION INTRAMUSCULAR; INTRAVENOUS at 05:36

## 2018-01-01 RX ADMIN — HYDROMORPHONE HYDROCHLORIDE 0.5 MG: 1 INJECTION, SOLUTION INTRAMUSCULAR; INTRAVENOUS; SUBCUTANEOUS at 00:42

## 2018-01-01 RX ADMIN — OXYCODONE HYDROCHLORIDE 10 MG: 5 TABLET ORAL at 23:35

## 2018-01-01 RX ADMIN — HYDROCORTISONE: 5 CREAM TOPICAL at 07:41

## 2018-01-01 RX ADMIN — PIPERACILLIN SODIUM,TAZOBACTAM SODIUM 4.5 G: 4; .5 INJECTION, POWDER, FOR SOLUTION INTRAVENOUS at 18:21

## 2018-01-01 RX ADMIN — HYDROMORPHONE HYDROCHLORIDE 0.5 MG: 1 INJECTION, SOLUTION INTRAMUSCULAR; INTRAVENOUS; SUBCUTANEOUS at 06:43

## 2018-01-01 RX ADMIN — ROCURONIUM BROMIDE 50 MG: 10 INJECTION INTRAVENOUS at 16:29

## 2018-01-01 RX ADMIN — PIPERACILLIN SODIUM AND TAZOBACTAM SODIUM 3.38 G: 36; 4.5 INJECTION, POWDER, FOR SOLUTION INTRAVENOUS at 20:11

## 2018-01-01 RX ADMIN — POTASSIUM CHLORIDE 20 MEQ: 400 INJECTION, SOLUTION INTRAVENOUS at 20:15

## 2018-01-01 RX ADMIN — DEXTROSE 500 ML: 5 SOLUTION INTRAVENOUS at 11:56

## 2018-01-01 RX ADMIN — PHYTONADIONE: 1 INJECTION, EMULSION INTRAMUSCULAR; INTRAVENOUS; SUBCUTANEOUS at 20:26

## 2018-01-01 RX ADMIN — OLANZAPINE 5 MG: 2.5 TABLET, FILM COATED ORAL at 22:53

## 2018-01-01 RX ADMIN — SODIUM CHLORIDE 2000 ML: 9 INJECTION, SOLUTION INTRAVENOUS at 10:27

## 2018-01-01 RX ADMIN — DEXTROSE 250 ML: 5 SOLUTION INTRAVENOUS at 08:35

## 2018-01-01 RX ADMIN — PIPERACILLIN SODIUM AND TAZOBACTAM SODIUM 3.38 G: 36; 4.5 INJECTION, POWDER, FOR SOLUTION INTRAVENOUS at 01:27

## 2018-01-01 RX ADMIN — METHADONE HYDROCHLORIDE 5 MG: 5 TABLET ORAL at 20:58

## 2018-01-01 RX ADMIN — I.V. FAT EMULSION 250 ML: 20 EMULSION INTRAVENOUS at 19:42

## 2018-01-01 RX ADMIN — OXYCODONE HYDROCHLORIDE 20 MG: 20 TABLET, FILM COATED, EXTENDED RELEASE ORAL at 20:05

## 2018-01-01 RX ADMIN — DEXAMETHASONE SODIUM PHOSPHATE 150 MG: 10 INJECTION, SOLUTION INTRAMUSCULAR; INTRAVENOUS at 11:52

## 2018-01-01 RX ADMIN — DEXAMETHASONE 4 MG: 4 TABLET ORAL at 07:51

## 2018-01-01 RX ADMIN — OXYCODONE HYDROCHLORIDE 10 MG: 5 TABLET ORAL at 00:14

## 2018-01-01 RX ADMIN — OXYCODONE HYDROCHLORIDE 10 MG: 5 TABLET ORAL at 10:40

## 2018-01-01 RX ADMIN — HYDROMORPHONE HYDROCHLORIDE 0.5 MG: 1 INJECTION, SOLUTION INTRAMUSCULAR; INTRAVENOUS; SUBCUTANEOUS at 16:45

## 2018-01-01 RX ADMIN — I.V. FAT EMULSION 250 ML: 20 EMULSION INTRAVENOUS at 20:05

## 2018-01-01 RX ADMIN — ENOXAPARIN SODIUM 40 MG: 100 INJECTION SUBCUTANEOUS at 21:40

## 2018-01-01 RX ADMIN — METHADONE HYDROCHLORIDE 5 MG: 5 TABLET ORAL at 21:38

## 2018-01-01 RX ADMIN — SODIUM CHLORIDE 2000 ML: 9 INJECTION, SOLUTION INTRAVENOUS at 14:33

## 2018-01-01 RX ADMIN — Medication 300 MG: at 14:40

## 2018-01-01 RX ADMIN — SODIUM CHLORIDE, PRESERVATIVE FREE 5 ML: 5 INJECTION INTRAVENOUS at 09:05

## 2018-01-01 RX ADMIN — HYDROMORPHONE HYDROCHLORIDE 1 MG: 1 INJECTION, SOLUTION INTRAMUSCULAR; INTRAVENOUS; SUBCUTANEOUS at 02:52

## 2018-01-01 RX ADMIN — HYDROMORPHONE HYDROCHLORIDE 2 MG: 2 INJECTION, SOLUTION INTRAMUSCULAR; INTRAVENOUS; SUBCUTANEOUS at 11:30

## 2018-01-01 RX ADMIN — IOPAMIDOL 92 ML: 755 INJECTION, SOLUTION INTRAVENOUS at 10:51

## 2018-01-01 RX ADMIN — ONDANSETRON 8 MG: 8 TABLET, ORALLY DISINTEGRATING ORAL at 09:17

## 2018-01-01 RX ADMIN — LIDOCAINE HYDROCHLORIDE 100 MG: 20 INJECTION, SOLUTION INFILTRATION; PERINEURAL at 12:09

## 2018-01-01 RX ADMIN — HYDROMORPHONE HYDROCHLORIDE 0.5 MG: 1 INJECTION, SOLUTION INTRAMUSCULAR; INTRAVENOUS; SUBCUTANEOUS at 06:17

## 2018-01-01 RX ADMIN — ONDANSETRON HYDROCHLORIDE 8 MG: 2 INJECTION, SOLUTION INTRAMUSCULAR; INTRAVENOUS at 17:31

## 2018-01-01 RX ADMIN — SODIUM CHLORIDE 1000 ML: 9 INJECTION, SOLUTION INTRAVENOUS at 07:41

## 2018-01-01 RX ADMIN — METRONIDAZOLE 500 MG: 500 INJECTION, SOLUTION INTRAVENOUS at 05:23

## 2018-01-01 RX ADMIN — Medication 0.5 MG: at 11:26

## 2018-01-01 RX ADMIN — I.V. FAT EMULSION 250 ML: 20 EMULSION INTRAVENOUS at 20:11

## 2018-01-01 RX ADMIN — PANTOPRAZOLE SODIUM 40 MG: 40 TABLET, DELAYED RELEASE ORAL at 07:41

## 2018-01-01 RX ADMIN — HYDROMORPHONE HYDROCHLORIDE 1 MG: 1 INJECTION, SOLUTION INTRAMUSCULAR; INTRAVENOUS; SUBCUTANEOUS at 22:09

## 2018-01-01 RX ADMIN — POTASSIUM CHLORIDE 10 MEQ: 10 INJECTION, SOLUTION INTRAVENOUS at 20:21

## 2018-01-01 RX ADMIN — PANTOPRAZOLE SODIUM 40 MG: 40 INJECTION, POWDER, FOR SOLUTION INTRAVENOUS at 08:07

## 2018-01-01 RX ADMIN — DEXAMETHASONE SODIUM PHOSPHATE 150 MG: 10 INJECTION, SOLUTION INTRAMUSCULAR; INTRAVENOUS at 12:03

## 2018-01-01 RX ADMIN — PIPERACILLIN SODIUM AND TAZOBACTAM SODIUM 3.38 G: 36; 4.5 INJECTION, POWDER, FOR SOLUTION INTRAVENOUS at 20:15

## 2018-01-01 RX ADMIN — ONDANSETRON 8 MG: 8 TABLET, ORALLY DISINTEGRATING ORAL at 01:22

## 2018-01-01 RX ADMIN — PANITUMUMAB 400 MG: 400 SOLUTION INTRAVENOUS at 13:42

## 2018-01-01 RX ADMIN — PANTOPRAZOLE SODIUM 40 MG: 40 TABLET, DELAYED RELEASE ORAL at 10:52

## 2018-01-01 RX ADMIN — POTASSIUM PHOSPHATE, MONOBASIC AND POTASSIUM PHOSPHATE, DIBASIC 15 MMOL: 224; 236 INJECTION, SOLUTION INTRAVENOUS at 09:25

## 2018-01-01 RX ADMIN — DEXAMETHASONE 4 MG: 4 TABLET ORAL at 08:49

## 2018-01-01 RX ADMIN — PIPERACILLIN SODIUM AND TAZOBACTAM SODIUM 3.38 G: 36; 4.5 INJECTION, POWDER, FOR SOLUTION INTRAVENOUS at 07:09

## 2018-01-01 RX ADMIN — ONDANSETRON HYDROCHLORIDE 8 MG: 2 INJECTION, SOLUTION INTRAMUSCULAR; INTRAVENOUS at 06:18

## 2018-01-01 RX ADMIN — CYANOCOBALAMIN TAB 1000 MCG 1000 MCG: 1000 TAB at 18:20

## 2018-01-01 RX ADMIN — HEPARIN SODIUM 5000 UNITS: 5000 INJECTION, SOLUTION INTRAVENOUS; SUBCUTANEOUS at 21:36

## 2018-01-01 RX ADMIN — ONDANSETRON 8 MG: 8 TABLET, ORALLY DISINTEGRATING ORAL at 14:09

## 2018-01-01 RX ADMIN — I.V. FAT EMULSION 250 ML: 20 EMULSION INTRAVENOUS at 20:17

## 2018-01-01 RX ADMIN — FENTANYL CITRATE 100 MCG: 50 INJECTION, SOLUTION INTRAMUSCULAR; INTRAVENOUS at 12:08

## 2018-01-01 RX ADMIN — SODIUM CHLORIDE: 9 INJECTION, SOLUTION INTRAVENOUS at 13:40

## 2018-01-01 RX ADMIN — SODIUM CHLORIDE, POTASSIUM CHLORIDE, SODIUM LACTATE AND CALCIUM CHLORIDE: 600; 310; 30; 20 INJECTION, SOLUTION INTRAVENOUS at 18:25

## 2018-01-01 RX ADMIN — PHYTONADIONE: 1 INJECTION, EMULSION INTRAMUSCULAR; INTRAVENOUS; SUBCUTANEOUS at 19:52

## 2018-01-01 RX ADMIN — SODIUM CHLORIDE, PRESERVATIVE FREE 5 ML: 5 INJECTION INTRAVENOUS at 11:26

## 2018-01-01 RX ADMIN — HYDROMORPHONE HYDROCHLORIDE 2 MG: 2 INJECTION INTRAMUSCULAR; INTRAVENOUS; SUBCUTANEOUS at 09:55

## 2018-01-01 RX ADMIN — CIPROFLOXACIN HYDROCHLORIDE 500 MG: 500 TABLET, FILM COATED ORAL at 08:27

## 2018-01-01 RX ADMIN — MIDAZOLAM 2 MG: 1 INJECTION INTRAMUSCULAR; INTRAVENOUS at 16:08

## 2018-01-01 RX ADMIN — ONDANSETRON 8 MG: 8 TABLET, ORALLY DISINTEGRATING ORAL at 18:00

## 2018-01-01 RX ADMIN — OMEPRAZOLE 20 MG: 20 CAPSULE, DELAYED RELEASE ORAL at 07:51

## 2018-01-01 RX ADMIN — HYDROMORPHONE HYDROCHLORIDE 1 MG: 1 INJECTION, SOLUTION INTRAMUSCULAR; INTRAVENOUS; SUBCUTANEOUS at 20:21

## 2018-01-01 RX ADMIN — Medication: at 01:33

## 2018-01-01 RX ADMIN — PROPOFOL 100 MG: 10 INJECTION, EMULSION INTRAVENOUS at 12:09

## 2018-01-01 RX ADMIN — OXYCODONE HYDROCHLORIDE 10 MG: 5 TABLET ORAL at 06:02

## 2018-01-01 RX ADMIN — OXYCODONE HYDROCHLORIDE 10 MG: 5 TABLET ORAL at 05:44

## 2018-01-01 RX ADMIN — POTASSIUM CHLORIDE, DEXTROSE MONOHYDRATE AND SODIUM CHLORIDE: 150; 5; 450 INJECTION, SOLUTION INTRAVENOUS at 21:36

## 2018-01-01 RX ADMIN — SODIUM CHLORIDE 1000 ML: 9 INJECTION, SOLUTION INTRAVENOUS at 15:30

## 2018-01-01 RX ADMIN — Medication 300 MG: at 14:39

## 2018-01-01 RX ADMIN — CIPROFLOXACIN HYDROCHLORIDE 500 MG: 500 TABLET, FILM COATED ORAL at 08:53

## 2018-01-01 RX ADMIN — HYDROMORPHONE HYDROCHLORIDE 2 MG: 1 INJECTION, SOLUTION INTRAMUSCULAR; INTRAVENOUS; SUBCUTANEOUS at 21:51

## 2018-01-01 RX ADMIN — OXYCODONE HYDROCHLORIDE 20 MG: 10 TABLET ORAL at 18:18

## 2018-01-01 RX ADMIN — I.V. FAT EMULSION 250 ML: 20 EMULSION INTRAVENOUS at 20:22

## 2018-01-01 RX ADMIN — SODIUM CHLORIDE: 9 INJECTION, SOLUTION INTRAVENOUS at 01:46

## 2018-01-01 RX ADMIN — ONDANSETRON 8 MG: 2 INJECTION INTRAMUSCULAR; INTRAVENOUS at 08:01

## 2018-01-01 RX ADMIN — HYDROMORPHONE HYDROCHLORIDE 2 MG: 1 INJECTION, SOLUTION INTRAMUSCULAR; INTRAVENOUS; SUBCUTANEOUS at 10:10

## 2018-01-01 RX ADMIN — DEXAMETHASONE SODIUM PHOSPHATE 150 MG: 10 INJECTION, SOLUTION INTRAMUSCULAR; INTRAVENOUS at 10:24

## 2018-01-01 RX ADMIN — CIPROFLOXACIN HYDROCHLORIDE 500 MG: 500 TABLET, FILM COATED ORAL at 20:49

## 2018-01-01 RX ADMIN — HEPARIN SODIUM 5000 UNITS: 5000 INJECTION, SOLUTION INTRAVENOUS; SUBCUTANEOUS at 04:08

## 2018-01-01 RX ADMIN — SODIUM CHLORIDE: 9 INJECTION, SOLUTION INTRAVENOUS at 08:42

## 2018-01-01 RX ADMIN — CEFTRIAXONE 1 G: 1 INJECTION, POWDER, FOR SOLUTION INTRAMUSCULAR; INTRAVENOUS at 16:10

## 2018-01-01 RX ADMIN — SODIUM CHLORIDE 150 MG: 9 INJECTION, SOLUTION INTRAVENOUS at 10:49

## 2018-01-01 RX ADMIN — ASCORBIC ACID, VITAMIN A PALMITATE, CHOLECALCIFEROL, THIAMINE HYDROCHLORIDE, RIBOFLAVIN-5 PHOSPHATE SODIUM, PYRIDOXINE HYDROCHLORIDE, NIACINAMIDE, DEXPANTHENOL, ALPHA-TOCOPHEROL ACETATE, VITAMIN K1, FOLIC ACID, BIOTIN, CYANOCOBALAMIN: 200; 3300; 200; 6; 3.6; 6; 40; 15; 10; 150; 600; 60; 5 INJECTION, SOLUTION INTRAVENOUS at 19:45

## 2018-01-01 RX ADMIN — Medication: at 20:05

## 2018-01-01 RX ADMIN — SODIUM CHLORIDE: 9 INJECTION, SOLUTION INTRAVENOUS at 18:33

## 2018-01-01 RX ADMIN — HEPARIN SODIUM 5000 UNITS: 5000 INJECTION, SOLUTION INTRAVENOUS; SUBCUTANEOUS at 11:48

## 2018-01-01 RX ADMIN — DRONABINOL 5 MG: 5 CAPSULE ORAL at 16:45

## 2018-01-01 RX ADMIN — SODIUM CHLORIDE, POTASSIUM CHLORIDE, SODIUM LACTATE AND CALCIUM CHLORIDE: 600; 310; 30; 20 INJECTION, SOLUTION INTRAVENOUS at 20:01

## 2018-01-01 RX ADMIN — OMEPRAZOLE 20 MG: 20 CAPSULE, DELAYED RELEASE ORAL at 08:53

## 2018-01-01 RX ADMIN — Medication: at 17:41

## 2018-01-01 RX ADMIN — MAGNESIUM OXIDE TAB 400 MG (241.3 MG ELEMENTAL MG) 400 MG: 400 (241.3 MG) TAB at 08:34

## 2018-01-01 RX ADMIN — I.V. FAT EMULSION 250 ML: 20 EMULSION INTRAVENOUS at 20:49

## 2018-01-01 RX ADMIN — HYDROMORPHONE HYDROCHLORIDE 0.5 MG: 1 INJECTION, SOLUTION INTRAMUSCULAR; INTRAVENOUS; SUBCUTANEOUS at 11:01

## 2018-01-01 RX ADMIN — CEFAZOLIN SODIUM 2 G: 2 INJECTION, SOLUTION INTRAVENOUS at 12:37

## 2018-01-01 RX ADMIN — Medication 20 MG: at 08:42

## 2018-01-01 RX ADMIN — OXYCODONE HYDROCHLORIDE 5 MG: 5 TABLET ORAL at 11:15

## 2018-01-01 RX ADMIN — PIPERACILLIN SODIUM,TAZOBACTAM SODIUM 3.38 G: 3; .375 INJECTION, POWDER, FOR SOLUTION INTRAVENOUS at 16:44

## 2018-01-01 RX ADMIN — ONDANSETRON 8 MG: 8 TABLET, ORALLY DISINTEGRATING ORAL at 08:45

## 2018-01-01 RX ADMIN — PHYTONADIONE: 1 INJECTION, EMULSION INTRAMUSCULAR; INTRAVENOUS; SUBCUTANEOUS at 20:54

## 2018-01-01 RX ADMIN — FENTANYL 1 PATCH: 75 PATCH, EXTENDED RELEASE TRANSDERMAL at 14:20

## 2018-01-01 RX ADMIN — LORAZEPAM 0.5 MG: 0.5 TABLET ORAL at 02:44

## 2018-01-01 RX ADMIN — METRONIDAZOLE 500 MG: 500 TABLET ORAL at 06:38

## 2018-01-01 RX ADMIN — CYANOCOBALAMIN TAB 1000 MCG 1000 MCG: 1000 TAB at 07:51

## 2018-01-01 RX ADMIN — Medication 300 MG: at 03:26

## 2018-01-01 RX ADMIN — LIDOCAINE HYDROCHLORIDE 80 MG: 20 INJECTION, SOLUTION INFILTRATION; PERINEURAL at 11:33

## 2018-01-01 RX ADMIN — SODIUM CHLORIDE 1000 ML: 9 INJECTION, SOLUTION INTRAVENOUS at 08:59

## 2018-01-01 RX ADMIN — OXYCODONE HYDROCHLORIDE 10 MG: 10 TABLET, FILM COATED, EXTENDED RELEASE ORAL at 11:11

## 2018-01-01 RX ADMIN — OXYCODONE HYDROCHLORIDE 20 MG: 20 TABLET, FILM COATED, EXTENDED RELEASE ORAL at 07:48

## 2018-01-01 RX ADMIN — DEXAMETHASONE SODIUM PHOSPHATE 150 MG: 10 INJECTION, SOLUTION INTRAMUSCULAR; INTRAVENOUS at 08:12

## 2018-01-01 RX ADMIN — OMEPRAZOLE 20 MG: 20 CAPSULE, DELAYED RELEASE ORAL at 08:28

## 2018-01-01 RX ADMIN — SODIUM CHLORIDE, PRESERVATIVE FREE 5 ML: 5 INJECTION INTRAVENOUS at 12:34

## 2018-01-01 RX ADMIN — OXYCODONE HYDROCHLORIDE 20 MG: 20 TABLET, FILM COATED, EXTENDED RELEASE ORAL at 05:17

## 2018-01-01 RX ADMIN — CYANOCOBALAMIN TAB 1000 MCG 1000 MCG: 1000 TAB at 08:28

## 2018-01-01 RX ADMIN — IOPAMIDOL 70 ML: 755 INJECTION, SOLUTION INTRAVASCULAR at 11:22

## 2018-01-01 RX ADMIN — PANTOPRAZOLE SODIUM 40 MG: 40 INJECTION, POWDER, FOR SOLUTION INTRAVENOUS at 08:27

## 2018-01-01 RX ADMIN — ONDANSETRON 8 MG: 8 TABLET, ORALLY DISINTEGRATING ORAL at 16:14

## 2018-01-01 RX ADMIN — POTASSIUM CHLORIDE 20 MEQ: 400 INJECTION, SOLUTION INTRAVENOUS at 11:23

## 2018-01-01 RX ADMIN — OMEPRAZOLE 20 MG: 20 CAPSULE, DELAYED RELEASE ORAL at 09:16

## 2018-01-01 RX ADMIN — ONDANSETRON 8 MG: 8 TABLET, ORALLY DISINTEGRATING ORAL at 17:20

## 2018-01-01 RX ADMIN — Medication 20 MG: at 05:42

## 2018-01-01 RX ADMIN — Medication 0.5 MG: at 09:41

## 2018-01-01 RX ADMIN — FERROUS SULFATE 325 MG: 325 TABLET, FILM COATED ORAL at 18:20

## 2018-01-01 RX ADMIN — OXYCODONE HYDROCHLORIDE 20 MG: 20 TABLET, FILM COATED, EXTENDED RELEASE ORAL at 05:48

## 2018-01-01 RX ADMIN — IOPAMIDOL 88 ML: 755 INJECTION, SOLUTION INTRAVENOUS at 15:31

## 2018-01-01 RX ADMIN — LIDOCAINE HYDROCHLORIDE 10 ML: 10 INJECTION, SOLUTION EPIDURAL; INFILTRATION; INTRACAUDAL; PERINEURAL at 13:56

## 2018-01-01 RX ADMIN — PIPERACILLIN SODIUM,TAZOBACTAM SODIUM 3.38 G: 3; .375 INJECTION, POWDER, FOR SOLUTION INTRAVENOUS at 20:37

## 2018-01-01 RX ADMIN — I.V. FAT EMULSION 250 ML: 20 EMULSION INTRAVENOUS at 20:25

## 2018-01-01 RX ADMIN — Medication 0.5 MG: at 08:35

## 2018-01-01 RX ADMIN — METRONIDAZOLE 500 MG: 500 INJECTION, SOLUTION INTRAVENOUS at 10:43

## 2018-01-01 RX ADMIN — IODIXANOL 15 ML: 320 INJECTION, SOLUTION INTRAVASCULAR at 12:45

## 2018-01-01 RX ADMIN — Medication 20 MG: at 13:53

## 2018-01-01 RX ADMIN — ENOXAPARIN SODIUM 40 MG: 100 INJECTION SUBCUTANEOUS at 18:41

## 2018-01-01 RX ADMIN — ONDANSETRON 8 MG: 2 INJECTION, SOLUTION INTRAMUSCULAR; INTRAVENOUS at 15:11

## 2018-01-01 RX ADMIN — SODIUM CHLORIDE 250 ML: 9 INJECTION, SOLUTION INTRAVENOUS at 09:23

## 2018-01-01 RX ADMIN — ROCURONIUM BROMIDE 40 MG: 10 INJECTION INTRAVENOUS at 11:33

## 2018-01-01 RX ADMIN — ALTEPLASE 2 MG: 2.2 INJECTION, POWDER, LYOPHILIZED, FOR SOLUTION INTRAVENOUS at 16:38

## 2018-01-01 RX ADMIN — Medication: at 08:27

## 2018-01-01 RX ADMIN — I.V. FAT EMULSION 250 ML: 20 EMULSION INTRAVENOUS at 20:53

## 2018-01-01 RX ADMIN — DIATRIZOATE MEGLUMINE AND DIATRIZOATE SODIUM 120 ML: 660; 100 SOLUTION ORAL; RECTAL at 13:19

## 2018-01-01 RX ADMIN — PHYTONADIONE: 1 INJECTION, EMULSION INTRAMUSCULAR; INTRAVENOUS; SUBCUTANEOUS at 19:48

## 2018-01-01 RX ADMIN — HYDROMORPHONE HYDROCHLORIDE 0.5 MG: 1 INJECTION, SOLUTION INTRAMUSCULAR; INTRAVENOUS; SUBCUTANEOUS at 20:48

## 2018-01-01 RX ADMIN — SODIUM CHLORIDE 1000 ML: 9 INJECTION, SOLUTION INTRAVENOUS at 09:54

## 2018-01-01 RX ADMIN — DEXTROSE 500 ML: 5 SOLUTION INTRAVENOUS at 13:51

## 2018-01-01 RX ADMIN — FUROSEMIDE 20 MG: 10 INJECTION, SOLUTION INTRAVENOUS at 10:14

## 2018-01-01 RX ADMIN — I.V. FAT EMULSION 250 ML: 20 EMULSION INTRAVENOUS at 19:45

## 2018-01-01 RX ADMIN — OXYCODONE HYDROCHLORIDE 10 MG: 10 TABLET, FILM COATED, EXTENDED RELEASE ORAL at 13:48

## 2018-01-01 RX ADMIN — HYDROMORPHONE HYDROCHLORIDE 2 MG: 2 INJECTION INTRAMUSCULAR; INTRAVENOUS; SUBCUTANEOUS at 11:17

## 2018-01-01 RX ADMIN — SODIUM CHLORIDE 1000 ML: 9 INJECTION, SOLUTION INTRAVENOUS at 23:11

## 2018-01-01 RX ADMIN — ONDANSETRON 8 MG: 8 TABLET, ORALLY DISINTEGRATING ORAL at 01:05

## 2018-01-01 RX ADMIN — PHYTONADIONE 5 MG: 5 TABLET ORAL at 10:51

## 2018-01-01 RX ADMIN — MULTIPLE VITAMINS W/ MINERALS TAB 1 TABLET: TAB at 08:26

## 2018-01-01 RX ADMIN — PHYTONADIONE: 1 INJECTION, EMULSION INTRAMUSCULAR; INTRAVENOUS; SUBCUTANEOUS at 20:22

## 2018-01-01 RX ADMIN — PIPERACILLIN SODIUM,TAZOBACTAM SODIUM 3.38 G: 3; .375 INJECTION, POWDER, FOR SOLUTION INTRAVENOUS at 03:43

## 2018-01-01 RX ADMIN — OXYCODONE HYDROCHLORIDE 10 MG: 5 TABLET ORAL at 04:12

## 2018-01-01 RX ADMIN — VANCOMYCIN HYDROCHLORIDE 1500 MG: 10 INJECTION, POWDER, LYOPHILIZED, FOR SOLUTION INTRAVENOUS at 11:48

## 2018-01-01 RX ADMIN — OXYCODONE HYDROCHLORIDE 10 MG: 5 TABLET ORAL at 19:52

## 2018-01-01 RX ADMIN — FLUOROURACIL 745 MG: 50 INJECTION, SOLUTION INTRAVENOUS at 16:20

## 2018-01-01 RX ADMIN — ROCURONIUM BROMIDE 50 MG: 10 INJECTION INTRAVENOUS at 12:09

## 2018-01-01 RX ADMIN — PANTOPRAZOLE SODIUM 40 MG: 40 INJECTION, POWDER, FOR SOLUTION INTRAVENOUS at 11:57

## 2018-01-01 RX ADMIN — OXYCODONE HYDROCHLORIDE 5 MG: 5 TABLET ORAL at 10:05

## 2018-01-01 RX ADMIN — FERROUS SULFATE 325 MG: 325 TABLET, FILM COATED ORAL at 17:18

## 2018-01-01 RX ADMIN — DRONABINOL 5 MG: 5 CAPSULE ORAL at 08:53

## 2018-01-01 RX ADMIN — MULTIPLE VITAMINS W/ MINERALS TAB 1 TABLET: TAB at 08:53

## 2018-01-01 RX ADMIN — ONDANSETRON HYDROCHLORIDE 8 MG: 2 INJECTION, SOLUTION INTRAMUSCULAR; INTRAVENOUS at 18:23

## 2018-01-01 RX ADMIN — PIPERACILLIN SODIUM,TAZOBACTAM SODIUM 4.5 G: 4; .5 INJECTION, POWDER, FOR SOLUTION INTRAVENOUS at 10:46

## 2018-01-01 RX ADMIN — HYDROMORPHONE HYDROCHLORIDE 0.5 MG: 1 INJECTION, SOLUTION INTRAMUSCULAR; INTRAVENOUS; SUBCUTANEOUS at 18:17

## 2018-01-01 RX ADMIN — I.V. FAT EMULSION 250 ML: 20 EMULSION INTRAVENOUS at 19:48

## 2018-01-01 RX ADMIN — PIPERACILLIN SODIUM,TAZOBACTAM SODIUM 3.38 G: 3; .375 INJECTION, POWDER, FOR SOLUTION INTRAVENOUS at 04:33

## 2018-01-01 RX ADMIN — POTASSIUM PHOSPHATE, MONOBASIC AND POTASSIUM PHOSPHATE, DIBASIC 15 MMOL: 224; 236 INJECTION, SOLUTION INTRAVENOUS at 16:17

## 2018-01-01 RX ADMIN — OXYCODONE HYDROCHLORIDE 10 MG: 5 TABLET ORAL at 17:11

## 2018-01-01 RX ADMIN — PHYTONADIONE: 1 INJECTION, EMULSION INTRAMUSCULAR; INTRAVENOUS; SUBCUTANEOUS at 20:27

## 2018-01-01 RX ADMIN — FERROUS SULFATE 325 MG: 325 TABLET, FILM COATED ORAL at 17:28

## 2018-01-01 RX ADMIN — BENZOCAINE AND MENTHOL 1 LOZENGE: 15; 3.6 LOZENGE ORAL at 08:21

## 2018-01-01 RX ADMIN — OLANZAPINE 5 MG: 2.5 TABLET, FILM COATED ORAL at 21:58

## 2018-01-01 RX ADMIN — ONDANSETRON 8 MG: 8 TABLET, ORALLY DISINTEGRATING ORAL at 22:36

## 2018-01-01 RX ADMIN — VANCOMYCIN HYDROCHLORIDE 1250 MG: 10 INJECTION, POWDER, LYOPHILIZED, FOR SOLUTION INTRAVENOUS at 10:30

## 2018-01-01 RX ADMIN — SODIUM CHLORIDE 1000 ML: 9 INJECTION, SOLUTION INTRAVENOUS at 13:12

## 2018-01-01 RX ADMIN — PIPERACILLIN SODIUM,TAZOBACTAM SODIUM 4.5 G: 4; .5 INJECTION, POWDER, FOR SOLUTION INTRAVENOUS at 12:20

## 2018-01-01 RX ADMIN — ENOXAPARIN SODIUM 40 MG: 40 INJECTION SUBCUTANEOUS at 16:38

## 2018-01-01 RX ADMIN — OXYCODONE HYDROCHLORIDE 20 MG: 20 TABLET, FILM COATED, EXTENDED RELEASE ORAL at 17:07

## 2018-01-01 RX ADMIN — ONDANSETRON 8 MG: 8 TABLET, ORALLY DISINTEGRATING ORAL at 16:55

## 2018-01-01 RX ADMIN — OXYCODONE HYDROCHLORIDE 10 MG: 5 TABLET ORAL at 18:21

## 2018-01-01 RX ADMIN — Medication: at 16:55

## 2018-01-01 RX ADMIN — FENTANYL 1 PATCH: 100 PATCH, EXTENDED RELEASE TRANSDERMAL at 13:04

## 2018-01-01 RX ADMIN — HYDROMORPHONE HYDROCHLORIDE 1 MG: 1 INJECTION, SOLUTION INTRAMUSCULAR; INTRAVENOUS; SUBCUTANEOUS at 07:00

## 2018-01-01 RX ADMIN — ENOXAPARIN SODIUM 40 MG: 40 INJECTION SUBCUTANEOUS at 17:40

## 2018-01-01 RX ADMIN — SODIUM CHLORIDE 1000 ML: 9 INJECTION, SOLUTION INTRAVENOUS at 04:23

## 2018-01-01 RX ADMIN — VANCOMYCIN HYDROCHLORIDE 1500 MG: 10 INJECTION, POWDER, LYOPHILIZED, FOR SOLUTION INTRAVENOUS at 10:17

## 2018-01-01 RX ADMIN — PHYTONADIONE: 1 INJECTION, EMULSION INTRAMUSCULAR; INTRAVENOUS; SUBCUTANEOUS at 20:09

## 2018-01-01 RX ADMIN — HYDROMORPHONE HYDROCHLORIDE 2 MG: 1 INJECTION, SOLUTION INTRAMUSCULAR; INTRAVENOUS; SUBCUTANEOUS at 23:42

## 2018-01-01 RX ADMIN — HYDROMORPHONE HYDROCHLORIDE 1 MG: 1 INJECTION, SOLUTION INTRAMUSCULAR; INTRAVENOUS; SUBCUTANEOUS at 01:47

## 2018-01-01 RX ADMIN — ATROPINE SULFATE 0.4 MG: 0.4 INJECTION, SOLUTION INTRAMUSCULAR; INTRAVENOUS; SUBCUTANEOUS at 14:36

## 2018-01-01 RX ADMIN — ONDANSETRON HYDROCHLORIDE 8 MG: 8 TABLET, FILM COATED ORAL at 07:52

## 2018-01-01 RX ADMIN — SODIUM CHLORIDE: 9 INJECTION, SOLUTION INTRAVENOUS at 18:22

## 2018-01-01 RX ADMIN — SODIUM CHLORIDE: 9 INJECTION, SOLUTION INTRAVENOUS at 08:41

## 2018-01-01 RX ADMIN — HYDROMORPHONE HYDROCHLORIDE 0.5 MG: 1 INJECTION, SOLUTION INTRAMUSCULAR; INTRAVENOUS; SUBCUTANEOUS at 03:29

## 2018-01-01 RX ADMIN — HEPARIN SODIUM 5000 UNITS: 5000 INJECTION, SOLUTION INTRAVENOUS; SUBCUTANEOUS at 20:41

## 2018-01-01 RX ADMIN — PANTOPRAZOLE SODIUM 40 MG: 40 TABLET, DELAYED RELEASE ORAL at 08:50

## 2018-01-01 RX ADMIN — HYDROMORPHONE HYDROCHLORIDE 0.5 MG: 1 INJECTION, SOLUTION INTRAMUSCULAR; INTRAVENOUS; SUBCUTANEOUS at 04:49

## 2018-01-01 RX ADMIN — I.V. FAT EMULSION 250 ML: 20 EMULSION INTRAVENOUS at 23:19

## 2018-01-01 RX ADMIN — ONDANSETRON HYDROCHLORIDE 8 MG: 8 TABLET, FILM COATED ORAL at 20:48

## 2018-01-01 RX ADMIN — HYDROMORPHONE HYDROCHLORIDE 0.5 MG: 1 INJECTION, SOLUTION INTRAMUSCULAR; INTRAVENOUS; SUBCUTANEOUS at 20:21

## 2018-01-01 RX ADMIN — PROPOFOL 200 MG: 10 INJECTION, EMULSION INTRAVENOUS at 11:33

## 2018-01-01 RX ADMIN — ONDANSETRON 8 MG: 8 TABLET, ORALLY DISINTEGRATING ORAL at 04:54

## 2018-01-01 RX ADMIN — OXYCODONE HYDROCHLORIDE 5 MG: 5 TABLET ORAL at 08:59

## 2018-01-01 RX ADMIN — HYDROMORPHONE HYDROCHLORIDE 1 MG: 1 INJECTION, SOLUTION INTRAMUSCULAR; INTRAVENOUS; SUBCUTANEOUS at 21:10

## 2018-01-01 RX ADMIN — GLUCAGON HYDROCHLORIDE 1 MG: 1 INJECTION, POWDER, FOR SOLUTION INTRAMUSCULAR; INTRAVENOUS; SUBCUTANEOUS at 12:15

## 2018-01-01 RX ADMIN — OMEPRAZOLE 20 MG: 20 CAPSULE, DELAYED RELEASE ORAL at 07:50

## 2018-01-01 RX ADMIN — POTASSIUM CHLORIDE 20 MEQ: 400 INJECTION, SOLUTION INTRAVENOUS at 19:21

## 2018-01-01 RX ADMIN — PANTOPRAZOLE SODIUM 40 MG: 40 TABLET, DELAYED RELEASE ORAL at 08:08

## 2018-01-01 RX ADMIN — SUGAMMADEX 200 MG: 100 INJECTION, SOLUTION INTRAVENOUS at 12:25

## 2018-01-01 RX ADMIN — OXYCODONE HYDROCHLORIDE 5 MG: 5 TABLET ORAL at 08:17

## 2018-01-01 RX ADMIN — OXYCODONE HYDROCHLORIDE 10 MG: 5 TABLET ORAL at 14:16

## 2018-01-01 RX ADMIN — Medication 10 MEQ: at 15:15

## 2018-01-01 RX ADMIN — SODIUM CHLORIDE: 9 INJECTION, SOLUTION INTRAVENOUS at 21:01

## 2018-01-01 RX ADMIN — IOPAMIDOL 88 ML: 755 INJECTION, SOLUTION INTRAVASCULAR at 10:46

## 2018-01-01 RX ADMIN — OXYCODONE HYDROCHLORIDE 20 MG: 20 TABLET, FILM COATED, EXTENDED RELEASE ORAL at 17:28

## 2018-01-01 RX ADMIN — OXYCODONE HYDROCHLORIDE 10 MG: 5 TABLET ORAL at 22:41

## 2018-01-01 RX ADMIN — SODIUM CHLORIDE 1000 ML: 9 INJECTION, SOLUTION INTRAVENOUS at 14:50

## 2018-01-01 RX ADMIN — CYANOCOBALAMIN TAB 1000 MCG 1000 MCG: 1000 TAB at 08:53

## 2018-01-01 RX ADMIN — SODIUM CHLORIDE, POTASSIUM CHLORIDE, SODIUM LACTATE AND CALCIUM CHLORIDE 1848 ML: 600; 310; 30; 20 INJECTION, SOLUTION INTRAVENOUS at 11:01

## 2018-01-01 RX ADMIN — PIPERACILLIN SODIUM,TAZOBACTAM SODIUM 4.5 G: 4; .5 INJECTION, POWDER, FOR SOLUTION INTRAVENOUS at 00:45

## 2018-01-01 RX ADMIN — DEXTROSE MONOHYDRATE 340 MG: 50 INJECTION, SOLUTION INTRAVENOUS at 14:04

## 2018-01-01 RX ADMIN — OXYCODONE HYDROCHLORIDE 5 MG: 5 TABLET ORAL at 21:04

## 2018-01-01 RX ADMIN — ONDANSETRON 8 MG: 8 TABLET, ORALLY DISINTEGRATING ORAL at 05:29

## 2018-01-01 RX ADMIN — PANTOPRAZOLE SODIUM 40 MG: 40 TABLET, DELAYED RELEASE ORAL at 08:25

## 2018-01-01 RX ADMIN — OXYCODONE HYDROCHLORIDE 20 MG: 20 TABLET, FILM COATED, EXTENDED RELEASE ORAL at 05:34

## 2018-01-01 RX ADMIN — SODIUM CHLORIDE: 9 INJECTION, SOLUTION INTRAVENOUS at 11:25

## 2018-01-01 RX ADMIN — SODIUM CHLORIDE: 9 INJECTION, SOLUTION INTRAVENOUS at 02:22

## 2018-01-01 RX ADMIN — ENOXAPARIN SODIUM 40 MG: 40 INJECTION SUBCUTANEOUS at 15:02

## 2018-01-01 RX ADMIN — ASCORBIC ACID, VITAMIN A PALMITATE, CHOLECALCIFEROL, THIAMINE HYDROCHLORIDE, RIBOFLAVIN-5 PHOSPHATE SODIUM, PYRIDOXINE HYDROCHLORIDE, NIACINAMIDE, DEXPANTHENOL, ALPHA-TOCOPHEROL ACETATE, VITAMIN K1, FOLIC ACID, BIOTIN, CYANOCOBALAMIN: 200; 3300; 200; 6; 3.6; 6; 40; 15; 10; 150; 600; 60; 5 INJECTION, SOLUTION INTRAVENOUS at 20:20

## 2018-01-01 RX ADMIN — SODIUM CHLORIDE: 9 INJECTION, SOLUTION INTRAVENOUS at 09:11

## 2018-01-01 RX ADMIN — ENOXAPARIN SODIUM 40 MG: 40 INJECTION SUBCUTANEOUS at 16:02

## 2018-01-01 RX ADMIN — ONDANSETRON 8 MG: 8 TABLET, ORALLY DISINTEGRATING ORAL at 16:45

## 2018-01-01 RX ADMIN — OMEPRAZOLE 20 MG: 20 CAPSULE, DELAYED RELEASE ORAL at 08:27

## 2018-01-01 RX ADMIN — OXYCODONE HYDROCHLORIDE 10 MG: 5 TABLET ORAL at 18:17

## 2018-01-01 RX ADMIN — ONDANSETRON 8 MG: 8 TABLET, ORALLY DISINTEGRATING ORAL at 08:49

## 2018-01-01 RX ADMIN — HYDROMORPHONE HYDROCHLORIDE 2 MG: 1 INJECTION, SOLUTION INTRAMUSCULAR; INTRAVENOUS; SUBCUTANEOUS at 19:40

## 2018-01-01 RX ADMIN — DEXAMETHASONE 8 MG: 4 TABLET ORAL at 08:25

## 2018-01-01 RX ADMIN — HYDROMORPHONE HYDROCHLORIDE 1 MG: 1 INJECTION, SOLUTION INTRAMUSCULAR; INTRAVENOUS; SUBCUTANEOUS at 16:02

## 2018-01-01 RX ADMIN — ENOXAPARIN SODIUM 40 MG: 100 INJECTION SUBCUTANEOUS at 17:31

## 2018-01-01 RX ADMIN — METRONIDAZOLE 500 MG: 500 TABLET ORAL at 12:58

## 2018-01-01 RX ADMIN — ONDANSETRON 8 MG: 8 TABLET, ORALLY DISINTEGRATING ORAL at 09:32

## 2018-01-01 RX ADMIN — OXALIPLATIN 130 MG: 5 INJECTION INTRAVENOUS at 12:32

## 2018-01-01 RX ADMIN — DEXTROSE MONOHYDRATE: 100 INJECTION, SOLUTION INTRAVENOUS at 10:31

## 2018-01-01 RX ADMIN — SODIUM CHLORIDE: 9 INJECTION, SOLUTION INTRAVENOUS at 20:09

## 2018-01-01 RX ADMIN — I.V. FAT EMULSION 250 ML: 20 EMULSION INTRAVENOUS at 20:40

## 2018-01-01 RX ADMIN — OXALIPLATIN 130 MG: 5 INJECTION, SOLUTION, CONCENTRATE INTRAVENOUS at 14:15

## 2018-01-01 RX ADMIN — Medication: at 13:47

## 2018-01-01 RX ADMIN — ENOXAPARIN SODIUM 40 MG: 40 INJECTION SUBCUTANEOUS at 17:27

## 2018-01-01 RX ADMIN — SODIUM CHLORIDE 1000 ML: 9 INJECTION, SOLUTION INTRAVENOUS at 09:20

## 2018-01-01 RX ADMIN — PIPERACILLIN SODIUM AND TAZOBACTAM SODIUM 3.38 G: 36; 4.5 INJECTION, POWDER, FOR SOLUTION INTRAVENOUS at 01:43

## 2018-01-01 RX ADMIN — ATROPINE SULFATE 0.4 MG: 0.4 INJECTION, SOLUTION INTRAMUSCULAR; INTRAVENOUS; SUBCUTANEOUS at 10:35

## 2018-01-01 RX ADMIN — HYDROMORPHONE HYDROCHLORIDE 0.5 MG: 1 INJECTION, SOLUTION INTRAMUSCULAR; INTRAVENOUS; SUBCUTANEOUS at 03:19

## 2018-01-01 RX ADMIN — SUGAMMADEX 200 MG: 100 INJECTION, SOLUTION INTRAVENOUS at 13:09

## 2018-01-01 RX ADMIN — PROCHLORPERAZINE EDISYLATE 10 MG: 5 INJECTION INTRAMUSCULAR; INTRAVENOUS at 11:16

## 2018-01-01 RX ADMIN — PANTOPRAZOLE SODIUM 40 MG: 40 INJECTION, POWDER, FOR SOLUTION INTRAVENOUS at 08:26

## 2018-01-01 RX ADMIN — DEXTROSE MONOHYDRATE 250 ML: 50 INJECTION, SOLUTION INTRAVENOUS at 10:24

## 2018-01-01 RX ADMIN — HYDROMORPHONE HYDROCHLORIDE 0.5 MG: 1 INJECTION, SOLUTION INTRAMUSCULAR; INTRAVENOUS; SUBCUTANEOUS at 02:11

## 2018-01-01 RX ADMIN — POTASSIUM PHOSPHATE, MONOBASIC AND POTASSIUM PHOSPHATE, DIBASIC 15 MMOL: 224; 236 INJECTION, SOLUTION INTRAVENOUS at 10:31

## 2018-01-01 RX ADMIN — FLUOROURACIL 745 MG: 50 INJECTION, SOLUTION INTRAVENOUS at 10:52

## 2018-01-01 RX ADMIN — MIDAZOLAM 2 MG: 1 INJECTION INTRAMUSCULAR; INTRAVENOUS at 12:25

## 2018-01-01 RX ADMIN — HYDROMORPHONE HYDROCHLORIDE 2 MG: 2 INJECTION INTRAMUSCULAR; INTRAVENOUS; SUBCUTANEOUS at 13:22

## 2018-01-01 RX ADMIN — HYDROMORPHONE HYDROCHLORIDE 0.5 MG: 1 INJECTION, SOLUTION INTRAMUSCULAR; INTRAVENOUS; SUBCUTANEOUS at 05:57

## 2018-01-01 RX ADMIN — HYDROMORPHONE HYDROCHLORIDE 1 MG: 1 INJECTION, SOLUTION INTRAMUSCULAR; INTRAVENOUS; SUBCUTANEOUS at 05:39

## 2018-01-01 RX ADMIN — SODIUM CHLORIDE: 9 INJECTION, SOLUTION INTRAVENOUS at 08:55

## 2018-01-01 RX ADMIN — PIPERACILLIN SODIUM,TAZOBACTAM SODIUM 4.5 G: 4; .5 INJECTION, POWDER, FOR SOLUTION INTRAVENOUS at 00:35

## 2018-01-01 RX ADMIN — Medication 0.5 MG: at 07:18

## 2018-01-01 RX ADMIN — OXYCODONE HYDROCHLORIDE 10 MG: 5 TABLET ORAL at 14:02

## 2018-01-01 RX ADMIN — OXYCODONE HYDROCHLORIDE 10 MG: 5 TABLET ORAL at 15:13

## 2018-01-01 RX ADMIN — OXYCODONE HYDROCHLORIDE 10 MG: 5 TABLET ORAL at 09:47

## 2018-01-01 RX ADMIN — VANCOMYCIN HYDROCHLORIDE 1250 MG: 10 INJECTION, POWDER, LYOPHILIZED, FOR SOLUTION INTRAVENOUS at 22:16

## 2018-01-01 RX ADMIN — VANCOMYCIN HYDROCHLORIDE 1500 MG: 10 INJECTION, POWDER, LYOPHILIZED, FOR SOLUTION INTRAVENOUS at 20:14

## 2018-01-01 ASSESSMENT — ENCOUNTER SYMPTOMS
FEVER: 0
CONFUSION: 0
VOMITING: 0
RHINORRHEA: 0
SHORTNESS OF BREATH: 0
COUGH: 0
ABDOMINAL PAIN: 1
DYSURIA: 0
DIFFICULTY URINATING: 0
BLOOD IN STOOL: 0
DIZZINESS: 0
NECK STIFFNESS: 0
ABDOMINAL PAIN: 1
NECK PAIN: 0
NAUSEA: 0
CHEST TIGHTNESS: 0
CHILLS: 1
DIARRHEA: 0
MYALGIAS: 0
ABDOMINAL DISTENTION: 1
DIAPHORESIS: 0
ABDOMINAL DISTENTION: 1
VOMITING: 0
ABDOMINAL PAIN: 0
FEVER: 1
SHORTNESS OF BREATH: 0
HEMATURIA: 0
ABDOMINAL PAIN: 1
SHORTNESS OF BREATH: 0
VOMITING: 0
APPETITE CHANGE: 1
SHORTNESS OF BREATH: 0
FEVER: 0
DYSURIA: 0
MYALGIAS: 1
FATIGUE: 1
LIGHT-HEADEDNESS: 0
ABDOMINAL PAIN: 0
CHILLS: 0
HEADACHES: 0
NAUSEA: 1
COUGH: 0
HEADACHES: 1
FEVER: 1
BRUISES/BLEEDS EASILY: 0
CHILLS: 0
VOMITING: 0
FLANK PAIN: 0
NAUSEA: 1
NAUSEA: 0
COLOR CHANGE: 0
BACK PAIN: 0
DYSURIA: 0
LIGHT-HEADEDNESS: 0
CONSTIPATION: 0
RHINORRHEA: 0
COLOR CHANGE: 0
POLYDIPSIA: 0
FEVER: 0
SORE THROAT: 0
NECK PAIN: 0
AGITATION: 0
ARTHRALGIAS: 0
FEVER: 0
PALPITATIONS: 0
NAUSEA: 1
BRUISES/BLEEDS EASILY: 0
ABDOMINAL PAIN: 1
VOMITING: 1
SHORTNESS OF BREATH: 0

## 2018-01-01 ASSESSMENT — PAIN DESCRIPTION - DESCRIPTORS
DESCRIPTORS: ACHING
DESCRIPTORS: ACHING;CRAMPING
DESCRIPTORS: ACHING
DESCRIPTORS: CRAMPING
DESCRIPTORS: CRAMPING
DESCRIPTORS: ACHING
DESCRIPTORS: DISCOMFORT
DESCRIPTORS: SORE
DESCRIPTORS: CRAMPING
DESCRIPTORS: CRAMPING
DESCRIPTORS: ACHING
DESCRIPTORS: DISCOMFORT
DESCRIPTORS: ACHING
DESCRIPTORS: CRAMPING;THROBBING
DESCRIPTORS: ACHING;DISCOMFORT;PRESSURE
DESCRIPTORS: ACHING
DESCRIPTORS: ACHING;CONSTANT
DESCRIPTORS: ACHING;CRAMPING
DESCRIPTORS: ACHING
DESCRIPTORS: CONSTANT;CRAMPING
DESCRIPTORS: SORE
DESCRIPTORS: DISCOMFORT
DESCRIPTORS: DISCOMFORT
DESCRIPTORS: CRAMPING
DESCRIPTORS: ACHING
DESCRIPTORS: ACHING;THROBBING
DESCRIPTORS: ACHING
DESCRIPTORS: DISCOMFORT
DESCRIPTORS: ACHING
DESCRIPTORS: ACHING
DESCRIPTORS: CRAMPING
DESCRIPTORS: ACHING
DESCRIPTORS: ACHING
DESCRIPTORS: CONSTANT
DESCRIPTORS: CONSTANT
DESCRIPTORS: ACHING
DESCRIPTORS: DISCOMFORT;OTHER (COMMENT)
DESCRIPTORS: ACHING
DESCRIPTORS: ACHING
DESCRIPTORS: SORE;ACHING
DESCRIPTORS: ACHING;SHARP
DESCRIPTORS: ACHING
DESCRIPTORS: ACHING
DESCRIPTORS: SORE;ACHING
DESCRIPTORS: CRAMPING
DESCRIPTORS: ACHING;PRESSURE
DESCRIPTORS: ACHING;CRAMPING
DESCRIPTORS: ACHING;CRAMPING
DESCRIPTORS: ACHING
DESCRIPTORS: CRAMPING
DESCRIPTORS: CRAMPING
DESCRIPTORS: ACHING
DESCRIPTORS: ACHING;CRAMPING
DESCRIPTORS: DISCOMFORT
DESCRIPTORS: ACHING;PRESSURE
DESCRIPTORS: CRAMPING
DESCRIPTORS: THROBBING
DESCRIPTORS: ACHING;CRAMPING
DESCRIPTORS: CRAMPING
DESCRIPTORS: ACHING;SHARP
DESCRIPTORS: CRAMPING
DESCRIPTORS: DISCOMFORT
DESCRIPTORS: ACHING;CRAMPING
DESCRIPTORS: ACHING
DESCRIPTORS: CRAMPING;ACHING
DESCRIPTORS: ACHING
DESCRIPTORS: ACHING
DESCRIPTORS: DISCOMFORT
DESCRIPTORS: THROBBING;CRAMPING
DESCRIPTORS: DISCOMFORT
DESCRIPTORS: THROBBING
DESCRIPTORS: CONSTANT
DESCRIPTORS: SORE;ACHING
DESCRIPTORS: DISCOMFORT
DESCRIPTORS: ACHING
DESCRIPTORS: ACHING;SORE
DESCRIPTORS: ACHING
DESCRIPTORS: THROBBING
DESCRIPTORS: ACHING
DESCRIPTORS: ACHING
DESCRIPTORS: ACHING;CRAMPING;SHARP
DESCRIPTORS: ACHING;DISCOMFORT
DESCRIPTORS: ACHING
DESCRIPTORS: ACHING;PRESSURE
DESCRIPTORS: ACHING;CRAMPING
DESCRIPTORS: SORE
DESCRIPTORS: DISCOMFORT
DESCRIPTORS: ACHING
DESCRIPTORS: ACHING
DESCRIPTORS: CRAMPING
DESCRIPTORS: ACHING;SORE
DESCRIPTORS: ACHING
DESCRIPTORS: CONSTANT;CRAMPING;THROBBING
DESCRIPTORS: CRAMPING;CONSTANT
DESCRIPTORS: ACHING;PRESSURE
DESCRIPTORS: ACHING
DESCRIPTORS: CRAMPING
DESCRIPTORS: ACHING
DESCRIPTORS: SORE
DESCRIPTORS: SORE;ACHING
DESCRIPTORS: ACHING;CRAMPING
DESCRIPTORS: ACHING;CRAMPING
DESCRIPTORS: ACHING
DESCRIPTORS: ACHING;CRAMPING
DESCRIPTORS: ACHING
DESCRIPTORS: CRAMPING
DESCRIPTORS: ACHING
DESCRIPTORS: ACHING
DESCRIPTORS: DISCOMFORT
DESCRIPTORS: ACHING
DESCRIPTORS: ACHING
DESCRIPTORS: CRUSHING
DESCRIPTORS: CONSTANT;CRAMPING
DESCRIPTORS: CRAMPING;DISCOMFORT
DESCRIPTORS: ACHING
DESCRIPTORS: CRAMPING
DESCRIPTORS: SORE
DESCRIPTORS: CRAMPING;ACHING
DESCRIPTORS: CONSTANT
DESCRIPTORS: THROBBING
DESCRIPTORS: ACHING
DESCRIPTORS: CRAMPING;CONSTANT
DESCRIPTORS: DISCOMFORT
DESCRIPTORS: DISCOMFORT;CONSTANT
DESCRIPTORS: ACHING;CRAMPING
DESCRIPTORS: DISCOMFORT
DESCRIPTORS: DISCOMFORT
DESCRIPTORS: CONSTANT;CRAMPING
DESCRIPTORS: CRAMPING
DESCRIPTORS: ACHING
DESCRIPTORS: CRAMPING
DESCRIPTORS: THROBBING
DESCRIPTORS: DISCOMFORT
DESCRIPTORS: ACHING
DESCRIPTORS: CRAMPING
DESCRIPTORS: CRAMPING
DESCRIPTORS: ACHING
DESCRIPTORS: DISCOMFORT
DESCRIPTORS: ACHING
DESCRIPTORS: ACHING
DESCRIPTORS: CRAMPING;ACHING
DESCRIPTORS: ACHING
DESCRIPTORS: CONSTANT
DESCRIPTORS: DISCOMFORT;CONSTANT
DESCRIPTORS: CRAMPING
DESCRIPTORS: SORE
DESCRIPTORS: ACHING
DESCRIPTORS: CRAMPING
DESCRIPTORS: ACHING;CRAMPING
DESCRIPTORS: CRAMPING
DESCRIPTORS: CRAMPING
DESCRIPTORS: ACHING;DISCOMFORT
DESCRIPTORS: ACHING;DISCOMFORT
DESCRIPTORS: DISCOMFORT
DESCRIPTORS: ACHING
DESCRIPTORS: ACHING
DESCRIPTORS: THROBBING
DESCRIPTORS: ACHING;CRAMPING
DESCRIPTORS: ACHING;PRESSURE
DESCRIPTORS: ACHING
DESCRIPTORS: ACHING;CRAMPING
DESCRIPTORS: ACHING
DESCRIPTORS: DISCOMFORT
DESCRIPTORS: ACHING

## 2018-01-01 ASSESSMENT — ACTIVITIES OF DAILY LIVING (ADL)
ADLS_ACUITY_SCORE: 11
ADLS_ACUITY_SCORE: 11
RETIRED_EATING: 0-->INDEPENDENT
COGNITION: 0 - NO COGNITION ISSUES REPORTED
ADLS_ACUITY_SCORE: 11
TRANSFERRING: 0-->INDEPENDENT
ADLS_ACUITY_SCORE: 11
SWALLOWING: 0-->SWALLOWS FOODS/LIQUIDS WITHOUT DIFFICULTY
ADLS_ACUITY_SCORE: 11
RETIRED_COMMUNICATION: 0-->UNDERSTANDS/COMMUNICATES WITHOUT DIFFICULTY
ADLS_ACUITY_SCORE: 11
DRESS: 0-->INDEPENDENT
ADLS_ACUITY_SCORE: 11
RETIRED_COMMUNICATION: 0-->UNDERSTANDS/COMMUNICATES WITHOUT DIFFICULTY
DRESS: 0-->INDEPENDENT
TRANSFERRING: 0-->INDEPENDENT
ADLS_ACUITY_SCORE: 11
AMBULATION: 0-->INDEPENDENT
ADLS_ACUITY_SCORE: 11
SWALLOWING: 0-->SWALLOWS FOODS/LIQUIDS WITHOUT DIFFICULTY
TOILETING: 0-->INDEPENDENT
ADLS_ACUITY_SCORE: 11
BATHING: 0-->INDEPENDENT
FALL_HISTORY_WITHIN_LAST_SIX_MONTHS: NO
ADLS_ACUITY_SCORE: 11
COGNITION: 0 - NO COGNITION ISSUES REPORTED
ADLS_ACUITY_SCORE: 11
DRESS: 0-->INDEPENDENT
TOILETING: 0-->INDEPENDENT
ADLS_ACUITY_SCORE: 11
BATHING: 0-->INDEPENDENT
RETIRED_EATING: 0-->INDEPENDENT
SWALLOWING: 0-->SWALLOWS FOODS/LIQUIDS WITHOUT DIFFICULTY
ADLS_ACUITY_SCORE: 11
FALL_HISTORY_WITHIN_LAST_SIX_MONTHS: NO
ADLS_ACUITY_SCORE: 11
TOILETING: 0-->INDEPENDENT
ADLS_ACUITY_SCORE: 11
RETIRED_COMMUNICATION: 0-->UNDERSTANDS/COMMUNICATES WITHOUT DIFFICULTY
ADLS_ACUITY_SCORE: 11
BATHING: 0-->INDEPENDENT
ADLS_ACUITY_SCORE: 11
ADLS_ACUITY_SCORE: 11
AMBULATION: 0-->INDEPENDENT
ADLS_ACUITY_SCORE: 11
RETIRED_EATING: 0-->INDEPENDENT

## 2018-01-01 ASSESSMENT — PAIN SCALES - GENERAL
PAINLEVEL: NO PAIN (0)
PAINLEVEL: NO PAIN (0)
PAINLEVEL: MILD PAIN (3)
PAINLEVEL: MODERATE PAIN (4)
PAINLEVEL: MILD PAIN (3)
PAINLEVEL: MODERATE PAIN (4)
PAINLEVEL: NO PAIN (0)
PAINLEVEL: MILD PAIN (2)
PAINLEVEL: MILD PAIN (2)
PAINLEVEL: NO PAIN (0)
PAINLEVEL: MODERATE PAIN (4)
PAINLEVEL: MODERATE PAIN (4)
PAINLEVEL: MODERATE PAIN (5)
PAINLEVEL: MILD PAIN (2)
PAINLEVEL: NO PAIN (0)
PAINLEVEL: MODERATE PAIN (5)
PAINLEVEL: NO PAIN (1)
PAINLEVEL: MILD PAIN (3)
PAINLEVEL: MODERATE PAIN (5)
PAINLEVEL: NO PAIN (1)
PAINLEVEL: NO PAIN (1)
PAINLEVEL: MILD PAIN (2)
PAINLEVEL: MILD PAIN (2)
PAINLEVEL: NO PAIN (1)
PAINLEVEL: MILD PAIN (2)
PAINLEVEL: MILD PAIN (3)
PAINLEVEL: MILD PAIN (2)
PAINLEVEL: MODERATE PAIN (4)
PAINLEVEL: EXTREME PAIN (8)
PAINLEVEL: MILD PAIN (2)
PAINLEVEL: NO PAIN (0)
PAINLEVEL: NO PAIN (0)
PAINLEVEL: NO PAIN (1)
PAINLEVEL: NO PAIN (0)
PAINLEVEL: MODERATE PAIN (5)
PAINLEVEL: MODERATE PAIN (4)

## 2018-01-01 ASSESSMENT — COPD QUESTIONNAIRES
COPD: 0
COPD: 0

## 2018-01-03 NOTE — TELEPHONE ENCOUNTER
Called patient to check in regarding scopolamine patch. He tells me he is not sure how much it has helped with his ileostomy. He states that he thinks his output is reduced maybe a little, but more or less is close the the same out put. He denies side effects, specifically states he is not dizzy or tired at all. He did notice he has been feeling more thirsty and has a dry mouth. He notes that he bought biotene mouth spray which he thinks is helpful. He tells me that because he feels more thirsty he has been drinking more fluids, which he feels is keeping the ileostomy output the same.     Advised I would update the MDs and get back to him with any instructions. He verbalized understanding and agreement with that plan.

## 2018-01-04 NOTE — TELEPHONE ENCOUNTER
Spoke with pharmacy - octreotide was covered by insurance for a $33 copay, no PA needed. Called patient and advised him of this and we discussed a nurse apt next Tuesday at 10AM for subQ medication administration. He is in agreement with this plan. Message sent to scheduling to add apt. Patient will call with any questions.

## 2018-01-04 NOTE — TELEPHONE ENCOUNTER
Please call patient to arrange RN visit to discuss how to administer subQ injection of octreotide 50 mcg tid which I have fill at [facility] pharmacy.  He should stop his scopolamine patch and have him follow up with me as scheduled on 1/25/18.  If not beneficial after 3 days we would have him increase to 100 mcg tid to see if it is effective for him.    Called patient to inform him that our RN will check for insurance eligibility, and if approved she will call to schedule an RN visit to discuss subQ medication administration.  He was in agreement with our plan and will monitor for side effects which were discussed with patient.

## 2018-01-09 NOTE — PROGRESS NOTES
"Patient instructed on proper injection technique and able to mirror technique and verbalize instructions. Patient gave self first injection in clinic with this RN present supervising. Patient reports this injection was mostly painless - slight burning with injection of medication. Burning stopped after medication administered. Reminded patient to allow alcohol to dry before injection. Patient verbalized comfort with injections going forward.     Patient given self subcutaneous injection printed instructions and injection site map. All patient's questions answered to his stated satisfaction. Patient provided with palliative contact info for arising questions/concerns/changes.      TEACHING AN ADULT PATIENT TO SELF-ADMINISTER AN   SUBCUTANEOUS INJECTION  ________________________________________________________________    1.  Demonstrate selection of the appropriate equipment.    Patient was provided with syringes and appropriate needles by pharmacy. I provided patient with alcohol wipes and large gloves.     2.  Demonstrate drawing up medication    1.0 ml of medication should be given subcutaneously, patient given single use vials and instructed how to draw up medication, patient able to demonstrate correct technique.     3.  Demonstrate locating correct site for administering injection.    Subcutaneous injection sites include:  abdomen below costal margins to iliac crests, and anterior aspect of thigh. Patient able to demonstrate correct location site and was provided with a \"map\" of possible locations.    4.  Demonstrate preparation of the site    Instruct patient to cleanse the site with alcohol starting in the center and rotating outward about 2 inches.  Let dry.    5.  Demonstrate administering the injection - INJECT THE NEEDLE    Subcutaneous injection give at 45 to 90 degree angle depending on amount of skin pinched.    *Inject medication slowly    6.  Discard needles and syringes in container    Any container " that needle cannot noe through easily is appropriate:  Example:  Used laundry detergent container. Recommended patient buy a small sharps container from the pharmacy for airplane travel/travel out of state. Recommended it be empty when boarding the plane.       35 minutes spent face to face with patient.  Oriana Patel RN

## 2018-01-09 NOTE — MR AVS SNAPSHOT
After Visit Summary   1/9/2018    Sebas Lopez    MRN: [id number]           Patient Information     Date Of Birth          1965        Visit Information        Provider Department      1/9/2018 10:00 AM Nurse,  Oncology [facility]        Today's Diagnoses     Diarrhea, unspecified type    -  1       Follow-ups after your visit        Your next 10 appointments already scheduled     Jan 25, 2018  2:15 PM CST   (Arrive by 2:00 PM)   Return Visit with Sukhdeep Mota MD   [facility] ([facility])    [address]  [address]  [facility] [address] [phone]              Who to contact     If you have questions or need follow up information about today's clinic visit or your schedule please contact [facility] directly at [phone].  Normal or non-critical lab and imaging results will be communicated to you by [employer], letter or phone within 4 business days after the clinic has received the results. If you do not hear from us within 7 days, please contact the clinic through [employer] or phone. If you have a critical or abnormal lab result, we will notify you by phone as soon as possible.  Submit refill requests through [employer] or call your pharmacy and they will forward the refill request to us. Please allow 3 business days for your refill to be completed.          Additional Information About Your Visit        MyChart Information     [employer] gives you secure access to your electronic health record. If you see a primary care provider, you can also send messages to your care team and make appointments. If you have questions, please call your primary care clinic.  If you do not have a primary care provider, please call [phone] and they will assist you.        Care EveryWhere ID     This is your Care EveryWhere ID. This could be used by other organizations to access your [facility]  records  [id number]         Blood Pressure from Last 3 Encounters:   12/21/17 125/81   12/14/17 115/76   12/14/17 115/83    Weight from Last 3 Encounters:   12/21/17 65.9 kg (145 lb 5 oz)   12/14/17 65.8 kg (145 lb 1.6 oz)   12/06/17 69.9 kg (154 lb 1.6 oz)              Today, you had the following     No orders found for display         Today's Medication Changes          These changes are accurate as of: 1/9/18 12:14 PM.  If you have any questions, ask your nurse or doctor.               These medicines have changed or have updated prescriptions.        Dose/Directions    Potassium Chloride 40 MEQ/15ML (20%) Soln   This may have changed:  when to take this   Used for:  Hypokalemia        Dose:  40 mEq   Take 15 mLs (40 mEq) by mouth daily   Quantity:  450 mL   Refills:  3                Primary Care Provider Office Phone # Fax #    Jaguar Becker [phone]       [address] [address]        Equal Access to Services     [facility]: Hadii karrie reddy hadasho Soomaali, waaxda luqadaha, qaybta kaalmada adewolf, bhargav sage . So [facility] [phone].    ATENCIÓN: Si habla español, tiene a icd disposición servicios gratuitos de asistencia lingüística. [facility] [phone].    We comply with applicable federal civil rights laws and [place] laws. We do not discriminate on the basis of race, color, national origin, age, disability, sex, sexual orientation, or gender identity.            Thank you!     Thank you for choosing [facility]  for your care. Our goal is always to provide you with excellent care. Hearing back from our patients is one way we can continue to improve our services. Please take a few minutes to complete the written survey that you may receive in the mail after your visit with us. Thank you!             Your Updated Medication List - Protect others around you: Learn how to safely use, store and throw away  your medicines at www.disposemymeds.org.          This list is accurate as of: 1/9/18 12:14 PM.  Always use your most recent med list.                   Brand Name Dispense Instructions for use Diagnosis    amylase-lipase-protease 15833 UNITS Cpep    CREON    240 capsule    Take 2 capsules (72,000 Units) by mouth 3 times daily (with meals) And 1 capsule with each snack tid.    Diarrhea, unspecified type, Peritoneal carcinomatosis (H), Cancer of sigmoid colon (H)       diphenoxylate-atropine 2.5-0.025 MG per tablet    LOMOTIL    240 tablet    Take 1-2 tablets by mouth 4 times daily as needed for diarrhea    Diarrhea due to drug       IRON SUPPLEMENT PO      Take 325 mg by mouth 2 times daily (with meals)        loperamide 2 MG capsule    IMODIUM     Take 4 mg by mouth 4 times daily as needed for diarrhea        LORazepam 0.5 MG tablet    ATIVAN    30 tablet    Take 1 tablet (0.5 mg) by mouth every 4 hours as needed (Anxiety, Nausea/Vomiting or Sleep)    Peritoneal carcinomatosis (H), Cancer of sigmoid colon (H)       magnesium oxide 400 MG tablet    MAG-OX    60 tablet    Take 1 tablet (400 mg) by mouth 2 times daily    Hypomagnesemia       octreotide 50 MCG/ML Soln injection    sandoSTATIN    90 mL    Inject 1 mL (50 mcg) Subcutaneous 3 times daily    Diarrhea, unspecified type       OMEPRAZOLE PO      Take 20 mg by mouth 2 times daily (before meals)        opium tincture tincture     72 mL    Take 0.6 mLs (6 mg) by mouth 4 times daily    Diarrhea, unspecified type, Cancer of sigmoid colon (H), Peritoneal carcinomatosis (H)       oxyCODONE IR 5 MG tablet    ROXICODONE    25 tablet    Take 1-2 tablets (5-10 mg) by mouth every 3 hours as needed for moderate to severe pain    Acute post-operative pain       Potassium Chloride 40 MEQ/15ML (20%) Soln     450 mL    Take 15 mLs (40 mEq) by mouth daily    Hypokalemia       Psyllium 51.7 % Pack    METAMUCIL FIBER    90 each    Take 1 packet by mouth 3 times daily     Diarrhea, unspecified type       scopolamine 72 hr patch    TRANSDERM    10 patch    Place 1 patch onto the skin every 72 hours    Diarrhea, unspecified type

## 2018-01-17 NOTE — TELEPHONE ENCOUNTER
"Called patient to follow up on octreotide injections -- how injections are going as well as how octreotide is working.    He tells me that the injections \"took a little getting used to\" but thinks they are now going well and he is more comfortable with them. He tells me now that he has done some many for the past week or so he is more comfortable and has realized some \"tricks\" like letting the medication warm up from the refrigerator a bit and not gripping his skin so tight.     He tells me that he thinks he output has decreased quite a bit and feels everything is \"going much better.\"     Patient currently in [place] and air travel went fine with his medications. He plans to follow up in clinic next week with Dr. Mota. He has palliative contact info for arising questions/concerns.   "

## 2018-01-25 NOTE — PROGRESS NOTES
Palliative Care Outpatient Clinic Progress Note    Patient Name:  Sebas Lopez  Primary Provider:  Jgauar Becker    Chief Complaint: Abdominal pain    Interim History:  Sebas Lopez 52 year old male returns to be seen by palliative care today by himself.     Patient has been having more abdominal pain over the past couple of days located in his lower midline abdomen.  Pain is not constant and worse with occasional movements (sitting up in bed) or palpation.  Pain is about 4/10 at its worst, and described as a dull ache similar to a stomach cramp.  Bowels have been more consistent.    Since starting the octreotide 50 mcg tid he has had a significant improvement of his output.  He went from emptying his ostomy q1-2 hours he now goes 3-4 hours.  He is happy with this and has not had any side effects (beyond bruising near injection site), and has been getting used to injecting his flank tid.  He continues to use opium tinctures 0.6 mL qid. He is interested in pursuing depot injection once monthly, and would like to look into whether insurance would cover that.    He recently went on vacation down in [place] to see his girlfriends daughter and son in law with some site seeing.      He has been buying his loperamide and omeprazole OTC and was wondering if he could get a prescription.     He is currently receiving long term disability from work, and was wondering about talking to SW to getting early social security which he has applied for, and he is wondering about collecting disability income/insurance (which he currently has through work) if he were to not return to work.      He has a f/u CT scan next week with further treatment option discussion with Oncology at that time.      Coping: Patient hair with support of his family and friend    Social History:  Pertinent changes to social history/social situation since last visit: None  Key support resources: Girlfriend, mother/father  Advance Directive  Status:  Not on file  Social History   Substance Use Topics     Smoking status: Never Smoker     Smokeless tobacco: Never Used     Alcohol use 3.0 oz/week     5 Cans of beer per week      Comment: none for last 4 months     Review of Systems:   ROS: 10 point ROS neg other than the symptoms noted above in the HPI.          Physical Exam:   Vitals were reviewed  /76  Pulse 74  Temp 99.4  F (37.4  C) (Tympanic)  Resp 16  Wt 67.1 kg (148 lb)  SpO2 98%  BMI 21.86 kg/m2  General: Alert, comfortable, thin appearing male in no acute distress.   Eyes: Sclera clear.   ENT: MMM. No ulcerations or plaques.   Cardiac: Normal rate, regular rhythm, no murmurs/extra HS, pulses +2 radial.  No LE edema.    Resp: CTAB, unlabored on room air.  No wheezes/rhonchi/rales.   Abd: Soft, non-distended,ostomy in place over RLQ draining brown liquid stool, Mild pain on palpation over midline lower abdomen, active bowel sounds.   Ext: Warm and well perfused.  No pedal edema.   Neuro: No facial asymmetry.  Spontaneous movements grossly non-focal.  Normal gait.   Neuropsych: Alert, appropriately interactive, full affect    Impression & Recommendations & Counseling:  Peritoneal carcinomatosis  Patient has had improvement in his ostomy output with initiation of octreotide 50 ug tid with changes of ostomy q4 hours.  At this time will look into depot shot for once monthly to help with stool output.  Patient requested refill of loperamide and omeprazole which were sent to his pharmacy.  He has repeat scan planned for next week and is meeting with oncology to discuss treatment options after those scans.      For now he is interested in pursuing financial/health insurance process, and will have Oriana contact his oncology team SW to help with this process.  Provided HCD and reviewed with him to fill out and bring to his f/u appointment.      RTC 4-6 weeks for a follow up.     Additional information reviewed today:   No Known  Allergies  Current Outpatient Prescriptions   Medication Sig Dispense Refill     opium tincture tincture Take 0.6 mLs (6 mg) by mouth 4 times daily 72 mL 0     octreotide (SANDOSTATIN) 50 MCG/ML SOLN injection Inject 1 mL (50 mcg) Subcutaneous 3 times daily 90 mL 2     scopolamine (TRANSDERM) 72 hr patch Place 1 patch onto the skin every 72 hours 10 patch 3     diphenoxylate-atropine (LOMOTIL) 2.5-0.025 MG per tablet Take 1-2 tablets by mouth 4 times daily as needed for diarrhea 240 tablet 5     amylase-lipase-protease (CREON) 83579 UNITS CPEP Take 2 capsules (72,000 Units) by mouth 3 times daily (with meals) And 1 capsule with each snack tid. 240 capsule 3     LORazepam (ATIVAN) 0.5 MG tablet Take 1 tablet (0.5 mg) by mouth every 4 hours as needed (Anxiety, Nausea/Vomiting or Sleep) 30 tablet 5     magnesium oxide (MAG-OX) 400 MG tablet Take 1 tablet (400 mg) by mouth 2 times daily 60 tablet 1     OMEPRAZOLE PO Take 20 mg by mouth 2 times daily (before meals)       Potassium Chloride 40 MEQ/15ML (20%) SOLN Take 15 mLs (40 mEq) by mouth daily 450 mL 3     Psyllium (METAMUCIL FIBER) 51.7 % PACK Take 1 packet by mouth 3 times daily 90 each 3     loperamide (IMODIUM) 2 MG capsule Take 4 mg by mouth 4 times daily as needed for diarrhea        Ferrous Sulfate (IRON SUPPLEMENT PO) Take 325 mg by mouth 2 times daily (with meals)        oxyCODONE IR (ROXICODONE) 5 MG tablet Take 1-2 tablets (5-10 mg) by mouth every 3 hours as needed for moderate to severe pain (Patient not taking: Reported on 12/21/2017) 25 tablet 0     Past Medical History:   Diagnosis Date     Adenocarcinoma of sigmoid colon (H)     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      HERNIA REPAIR       INSERT PORT VASCULAR ACCESS Right 8/10/2017    Procedure: INSERT PORT VASCULAR ACCESS;  Single Lumen Right Chest Power Port;  Surgeon: Malena Andrew PA-C;  [facility]     LAPAROSCOPY DIAGNOSTIC (GENERAL) N/A 12/6/2017    Procedure: LAPAROSCOPY DIAGNOSTIC (GENERAL);  Diagnostic Laparoscopy, Exploratory Laparotomy Anesthesia Block ;  Surgeon: Rob Dudley MD;  Location: [facility]     LAPAROTOMY EXPLORATORY N/A 12/6/2017    Procedure: LAPAROTOMY EXPLORATORY;;  Surgeon: Rob Dudley MD;  Location: [facility]     ORTHOPEDIC SURGERY Left     HIP ARTHROPLASTY       Key Data Reviewed:  LABS:   Lab Results   Component Value Date    WBC 13.5 (H) 12/14/2017    HGB 10.6 (L) 12/14/2017    HCT 32.1 (L) 12/14/2017     (H) 12/14/2017     12/14/2017    POTASSIUM 3.9 12/14/2017    CHLORIDE 99 12/14/2017    CO2 25 12/14/2017    BUN 14 12/14/2017    CR 0.95 12/14/2017     (H) 12/14/2017    AST 28 12/14/2017    ALT 24 12/14/2017    ALKPHOS 172 (H) 12/14/2017    BILITOTAL 0.4 12/14/2017    INR 1.12 08/10/2017     IMAGING:   CT CAP (11/6/17):   1. Revisualization of the sigmoid colon mass with adjacent mesenteric  adenopathy, not significantly changed.  2. No significant change in mesenteric nodules.  3. No evidence of metastatic disease in the liver or chest.  4. Postsurgical changes of loop ileostomy. No evidence of obstruction.  5. No significant change in small indeterminate cysts in the kidneys,  continued attention follow-up.    [place]  - Reviewed    Thank you for continuing to involve me in the care of this patient.     Sukhdeep Mota DO / Palliative Medicine Fellow / Pager [phone] / After-Hours Answering Service  [phone] / Main Palliative Clinic - [facility] [phone] / [facility] Inpatient Team Consult Pager [phone] (answered 8am-430pm M-F)    Attending Note:  Patient seen and evaluated with Dr Mota and I agree with/confirm his findings/recs in this note.     Thank you for involving us in the patient's care.   Lorenzo Trujillo MD / Palliative Medicine / Pager [phone] / [facility] Inpatient Team Consult Pager [phone] (answered 8am-430pm M-F) - ok to text page via Be[employer]an / After-Hours Answering Service [phone] / Palliative Clinic in the [facility] at the [facility] - [phone] (scheduling); [phone] (triage).  3

## 2018-01-25 NOTE — MR AVS SNAPSHOT
After Visit Summary   1/25/2018    Sebas Lopez    MRN: [id number]           Patient Information     Date Of Birth          1965        Visit Information        Provider Department      1/25/2018 2:15 PM Sukhdeep Mota MD [facility]        Today's Diagnoses     Peritoneal carcinomatosis (H)    -  1    Diarrhea, unspecified type          Care Instructions    -Refilled loperamide 4 mg four times daily for loose stool  -Also refilled omeprazole 20 mg daily to your local pharmacy  -we will look into octreotide long term injection at your current dose and check on whether insurance will cover it.    -Come back in 4-6 weeks for follow up.          Follow-ups after your visit        Follow-up notes from your care team     Return in about 4 weeks (around 2/22/2018).      Your next 10 appointments already scheduled     Feb 01, 2018 11:40 AM CST   (Arrive by 11:25 AM)   CT CHEST ABDOMEN PELVIS W/O & W CONTRAST with UC[facility]2   [facility] ([facility])    [address] [address] [phone]           Please bring any scans or X-rays taken at other hospitals, if similar tests were done. Also bring a list of your medicines, including vitamins, minerals and over-the-counter drugs. It is safest to leave personal items at home.  Be sure to tell your doctor:   If you have any allergies.   If there s any chance you are pregnant.   If you are breastfeeding.   If you have any special needs.  You may have contrast for this exam. To prepare:   Do not eat or drink for 2 hours before your exam. If you need to take medicine, you may take it with small sips of water. (We may ask you to take liquid medicine as well.)   The day before your exam, drink extra fluids at least six 8-ounce glasses (unless your doctor tells you to restrict your fluids).  Patients over 70 or patients with diabetes or kidney problems:   If you  haven t had a blood test (creatinine test) within the last 30 days, go to your clinic or Diagnostic Imaging Department for this test.  If you have diabetes:   If your kidney function is normal, continue taking your metformin (Avandamet, Glucophage, Glucovance, Metaglip) on the day of your exam.   If your kidney function is abnormal, wait 48 hours before restarting this medicine.  You will have oral contrast for this exam:   You will drink the contrast at home. Get this from your clinic or Diagnostic Imaging Department. Please follow the directions given.  Please wear loose clothing, such as a sweat suit or jogging clothes. Avoid snaps, zippers and other metal. We may ask you to undress and put on a hospital gown.  If you have any questions, please call the Imaging Department where you will have your exam.            Feb 01, 2018  2:30 PM CST   (Arrive by 2:15 PM)   Return Visit with Albert Long MD   [facility] ([facility] [place])    [address] [place] [address]  [address] [place] [address] [phone]              Future tests that were ordered for you today     Open Future Orders        Priority Expected Expires Ordered    CT Chest abdomen pelvis w & w/o contrast Routine  1/24/2019 1/24/2018            Who to contact     If you have questions or need follow up information about today's clinic visit or your schedule please contact [facility] directly at [phone].  Normal or non-critical lab and imaging results will be communicated to you by MyChart, letter or phone within 4 business days after the clinic has received the results. If you do not hear from us within 7 days, please contact the clinic through MyChart or phone. If you have a critical or abnormal lab result, we will notify you by phone as soon as possible.  Submit refill requests through [employer] or call your pharmacy and they will forward the refill request to us. Please allow 3 business  days for your refill to be completed.          Additional Information About Your Visit        SafetyWebhart Information     [employer] gives you secure access to your electronic health record. If you see a primary care provider, you can also send messages to your care team and make appointments. If you have questions, please call your primary care clinic.  If you do not have a primary care provider, please call [phone] and they will assist you.        Care EveryWhere ID     This is your Care EveryWhere ID. This could be used by other organizations to access your [place] medical records  [id number]        Your Vitals Were     Pulse Temperature Respirations Pulse Oximetry BMI (Body Mass Index)       74 99.4  F (37.4  C) (Tympanic) 16 98% 21.86 kg/m2        Blood Pressure from Last 3 Encounters:   01/25/18 113/76   12/21/17 125/81   12/14/17 115/76    Weight from Last 3 Encounters:   01/25/18 67.1 kg (148 lb)   12/21/17 65.9 kg (145 lb 5 oz)   12/14/17 65.8 kg (145 lb 1.6 oz)              Today, you had the following     No orders found for display         Today's Medication Changes          These changes are accurate as of 1/25/18  3:12 PM.  If you have any questions, ask your nurse or doctor.               These medicines have changed or have updated prescriptions.        Dose/Directions    omeprazole 20 MG CR capsule   Commonly known as:  priLOSEC   This may have changed:    - medication strength  - when to take this   Used for:  Diarrhea, unspecified type   Changed by:  Sukhdeep Mota MD        Dose:  20 mg   Take 1 capsule (20 mg) by mouth daily   Quantity:  30 capsule   Refills:  11         Stop taking these medicines if you haven't already. Please contact your care team if you have questions.     scopolamine 72 hr patch   Commonly known as:  TRANSDERM   Stopped by:  Sukhdeep Mota MD                Where to get your medicines      [place] [facility] [place] [address] [address]     Phone:  [phone]     loperamide 2 MG capsule    omeprazole 20 MG CR capsule                Primary Care Provider Office Phone # Fax #    Jaguar Becker [phone]       [facility] [address]  [facility] [address]        Equal Access to Services     [facility]: Hadii aad ku hadasho Soomaali, waaxda luqadaha, qaybta kaalmada adeegyada, waxay idiin hayaan adeeg khzullyvijay laboone . So [facility] [phone].    ATENCIÓN: Si habla español, tiene a cid disposición servicios gratuitos de asistencia lingüística. Kenan al [phone].    We comply with applicable federal civil rights laws and [place] laws. We do not discriminate on the basis of race, color, national origin, age, disability, sex, sexual orientation, or gender identity.            Thank you!     Thank you for choosing [facility]  for your care. Our goal is always to provide you with excellent care. Hearing back from our patients is one way we can continue to improve our services. Please take a few minutes to complete the written survey that you may receive in the mail after your visit with us. Thank you!             Your Updated Medication List - Protect others around you: Learn how to safely use, store and throw away your medicines at www.disposemymeds.org.          This list is accurate as of 1/25/18  3:12 PM.  Always use your most recent med list.                   Brand Name Dispense Instructions for use Diagnosis    amylase-lipase-protease 15887 UNITS Cpep    CREON    240 capsule    Take 2 capsules (72,000 Units) by mouth 3 times daily (with meals) And 1 capsule with each snack tid.    Diarrhea, unspecified type, Peritoneal carcinomatosis (H), Cancer of sigmoid colon (H)       diphenoxylate-atropine 2.5-0.025 MG per tablet    LOMOTIL    240 tablet    Take 1-2 tablets by mouth 4 times daily as needed for diarrhea    Diarrhea due to  drug       IRON SUPPLEMENT PO      Take 325 mg by mouth 2 times daily (with meals)        loperamide 2 MG capsule    IMODIUM    240 capsule    Take 2 capsules (4 mg) by mouth 4 times daily as needed for diarrhea    Diarrhea, unspecified type       LORazepam 0.5 MG tablet    ATIVAN    30 tablet    Take 1 tablet (0.5 mg) by mouth every 4 hours as needed (Anxiety, Nausea/Vomiting or Sleep)    Peritoneal carcinomatosis (H), Cancer of sigmoid colon (H)       magnesium oxide 400 MG tablet    MAG-OX    60 tablet    Take 1 tablet (400 mg) by mouth 2 times daily    Hypomagnesemia       octreotide 50 MCG/ML Soln injection    sandoSTATIN    90 mL    Inject 1 mL (50 mcg) Subcutaneous 3 times daily    Diarrhea, unspecified type       omeprazole 20 MG CR capsule    priLOSEC    30 capsule    Take 1 capsule (20 mg) by mouth daily    Diarrhea, unspecified type       opium tincture tincture     72 mL    Take 0.6 mLs (6 mg) by mouth 4 times daily    Diarrhea, unspecified type, Cancer of sigmoid colon (H), Peritoneal carcinomatosis (H)       oxyCODONE IR 5 MG tablet    ROXICODONE    25 tablet    Take 1-2 tablets (5-10 mg) by mouth every 3 hours as needed for moderate to severe pain    Acute post-operative pain       Potassium Chloride 40 MEQ/15ML (20%) Soln     450 mL    Take 15 mLs (40 mEq) by mouth daily    Hypokalemia       Psyllium 51.7 % Pack    METAMUCIL FIBER    90 each    Take 1 packet by mouth 3 times daily    Diarrhea, unspecified type

## 2018-01-25 NOTE — PATIENT INSTRUCTIONS
-Refilled loperamide 4 mg four times daily for loose stool  -Also refilled omeprazole 20 mg daily to your local pharmacy  -we will look into octreotide long term injection at your current dose and check on whether insurance will cover it.    -Come back in 4-6 weeks for follow up.

## 2018-01-25 NOTE — NURSING NOTE
"Oncology Rooming Note    January 25, 2018 2:21 PM   Sebas Lopez is a 52 year old male who presents for:    Chief Complaint   Patient presents with     Oncology Clinic Visit     Cancer of sigmoid colon  4 wk follow up      Initial Vitals: /76  Pulse 74  Temp 99.4  F (37.4  C) (Tympanic)  Resp 16  Wt 67.1 kg (148 lb)  SpO2 98%  BMI 21.86 kg/m2 Estimated body mass index is 21.86 kg/(m^2) as calculated from the following:    Height as of 12/21/17: 1.753 m (5' 9\").    Weight as of this encounter: 67.1 kg (148 lb). Body surface area is 1.81 meters squared.  Moderate Pain (5) Comment: Data Unavailable   No LMP for male patient.  Allergies reviewed: Yes  Medications reviewed: Yes    Medications: Medication refills not needed today.  [place] [facility] [facility] [place] [address]    Clinical concerns: stomach cramping  provider was notified.    7 minutes for nursing intake (face to face time)     Channing Gruber              "

## 2018-01-25 NOTE — LETTER
1/25/2018       RE: Sebas Lopez  [address] [place] [place] [address]     Dear Colleague,    Thank you for referring your patient, Sebas Lopez, to the [facility] at [facility]. Please see a copy of my visit note below.    Palliative Care Outpatient Clinic Progress Note    Patient Name:  Sebas Lopez  Primary Provider:  Jaguar Becker    Chief Complaint: Abdominal pain    Interim History:  Sebas Lopez 52 year old male returns to be seen by palliative care today by himself.     Patient has been having more abdominal pain over the past couple of days located in his lower midline abdomen.  Pain is not constant and worse with occasional movements (sitting up in bed) or palpation.  Pain is about 4/10 at its worst, and described as a dull ache similar to a stomach cramp.  Bowels have been more consistent.    Since starting the octreotide 50 mcg tid he has had a significant improvement of his output.  He went from emptying his ostomy q1-2 hours he now goes 3-4 hours.  He is happy with this and has not had any side effects (beyond bruising near injection site), and has been getting used to injecting his flank tid.  He continues to use opium tinctures 0.6 mL qid. He is interested in pursuing depot injection once monthly, and would like to look into whether insurance would cover that.    He recently went on vacation down in [place] to see his girlfriends daughter and son in law with some site seeing.      He has been buying his loperamide and omeprazole OTC and was wondering if he could get a prescription.     He is currently receiving long term disability from work, and was wondering about talking to SW to getting early social security which he has applied for, and he is wondering about collecting disability income/insurance (which he currently has through work) if he were to not return to work.      He has a f/u CT scan next week with  further treatment option discussion with Oncology at that time.      Coping: Patient hair with support of his family and friend    Social History:  Pertinent changes to social history/social situation since last visit: None  Key support resources: Girlfriend, mother/father  Advance Directive Status:  Not on file  Social History   Substance Use Topics     Smoking status: Never Smoker     Smokeless tobacco: Never Used     Alcohol use 3.0 oz/week     5 Cans of beer per week      Comment: none for last 4 months     Review of Systems:   ROS: 10 point ROS neg other than the symptoms noted above in the HPI.          Physical Exam:   Vitals were reviewed  /76  Pulse 74  Temp 99.4  F (37.4  C) (Tympanic)  Resp 16  Wt 67.1 kg (148 lb)  SpO2 98%  BMI 21.86 kg/m2  General: Alert, comfortable, thin appearing male in no acute distress.   Eyes: Sclera clear.   ENT: MMM. No ulcerations or plaques.   Cardiac: Normal rate, regular rhythm, no murmurs/extra HS, pulses +2 radial.  No LE edema.    Resp: CTAB, unlabored on room air.  No wheezes/rhonchi/rales.   Abd: Soft, non-distended,ostomy in place over RLQ draining brown liquid stool, Mild pain on palpation over midline lower abdomen, active bowel sounds.   Ext: Warm and well perfused.  No pedal edema.   Neuro: No facial asymmetry.  Spontaneous movements grossly non-focal.  Normal gait.   Neuropsych: Alert, appropriately interactive, full affect    Impression & Recommendations & Counseling:  Peritoneal carcinomatosis  Patient has had improvement in his ostomy output with initiation of octreotide 50 ug tid with changes of ostomy q4 hours.  At this time will look into depot shot for once monthly to help with stool output.  Patient requested refill of loperamide and omeprazole which were sent to his pharmacy.  He has repeat scan planned for next week and is meeting with oncology to discuss treatment options after those scans.      For now he is interested in pursuing  financial/health insurance process, and will have Oriana contact his oncology team SW to help with this process.  Provided HCD and reviewed with him to fill out and bring to his f/u appointment.      RTC 4-6 weeks for a follow up.     Additional information reviewed today:   No Known Allergies  Current Outpatient Prescriptions   Medication Sig Dispense Refill     opium tincture tincture Take 0.6 mLs (6 mg) by mouth 4 times daily 72 mL 0     octreotide (SANDOSTATIN) 50 MCG/ML SOLN injection Inject 1 mL (50 mcg) Subcutaneous 3 times daily 90 mL 2     scopolamine (TRANSDERM) 72 hr patch Place 1 patch onto the skin every 72 hours 10 patch 3     diphenoxylate-atropine (LOMOTIL) 2.5-0.025 MG per tablet Take 1-2 tablets by mouth 4 times daily as needed for diarrhea 240 tablet 5     amylase-lipase-protease (CREON) 72935 UNITS CPEP Take 2 capsules (72,000 Units) by mouth 3 times daily (with meals) And 1 capsule with each snack tid. 240 capsule 3     LORazepam (ATIVAN) 0.5 MG tablet Take 1 tablet (0.5 mg) by mouth every 4 hours as needed (Anxiety, Nausea/Vomiting or Sleep) 30 tablet 5     magnesium oxide (MAG-OX) 400 MG tablet Take 1 tablet (400 mg) by mouth 2 times daily 60 tablet 1     OMEPRAZOLE PO Take 20 mg by mouth 2 times daily (before meals)       Potassium Chloride 40 MEQ/15ML (20%) SOLN Take 15 mLs (40 mEq) by mouth daily 450 mL 3     Psyllium (METAMUCIL FIBER) 51.7 % PACK Take 1 packet by mouth 3 times daily 90 each 3     loperamide (IMODIUM) 2 MG capsule Take 4 mg by mouth 4 times daily as needed for diarrhea        Ferrous Sulfate (IRON SUPPLEMENT PO) Take 325 mg by mouth 2 times daily (with meals)        oxyCODONE IR (ROXICODONE) 5 MG tablet Take 1-2 tablets (5-10 mg) by mouth every 3 hours as needed for moderate to severe pain (Patient not taking: Reported on 12/21/2017) 25 tablet 0     Past Medical History:   Diagnosis Date     Adenocarcinoma of sigmoid colon (H)     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      HERNIA REPAIR       INSERT PORT VASCULAR ACCESS Right 8/10/2017    Procedure: INSERT PORT VASCULAR ACCESS;  Single Lumen Right Chest Power Port;  Surgeon: Malena Andrew PA-C;  [facility]     LAPAROSCOPY DIAGNOSTIC (GENERAL) N/A 12/6/2017    Procedure: LAPAROSCOPY DIAGNOSTIC (GENERAL);  Diagnostic Laparoscopy, Exploratory Laparotomy Anesthesia Block ;  Surgeon: Rob Dudley MD;  Location: [facility]     LAPAROTOMY EXPLORATORY N/A 12/6/2017    Procedure: LAPAROTOMY EXPLORATORY;;  Surgeon: Rob Dudley MD;  Location: [facility]     ORTHOPEDIC SURGERY Left     HIP ARTHROPLASTY       Key Data Reviewed:  LABS:   Lab Results   Component Value Date    WBC 13.5 (H) 12/14/2017    HGB 10.6 (L) 12/14/2017    HCT 32.1 (L) 12/14/2017     (H) 12/14/2017     12/14/2017    POTASSIUM 3.9 12/14/2017    CHLORIDE 99 12/14/2017    CO2 25 12/14/2017    BUN 14 12/14/2017    CR 0.95 12/14/2017     (H) 12/14/2017    AST 28 12/14/2017    ALT 24 12/14/2017    ALKPHOS 172 (H) 12/14/2017    BILITOTAL 0.4 12/14/2017    INR 1.12 08/10/2017     IMAGING:   CT CAP (11/6/17):   1. Revisualization of the sigmoid colon mass with adjacent mesenteric  adenopathy, not significantly changed.  2. No significant change in mesenteric nodules.  3. No evidence of metastatic disease in the liver or chest.  4. Postsurgical changes of loop ileostomy. No evidence of obstruction.  5. No  significant change in small indeterminate cysts in the kidneys,  continued attention follow-up.    [place]  - Reviewed    Thank you for continuing to involve me in the care of this patient.     Sukhdeep Mota DO / Palliative Medicine Fellow / Pager [phone] / After-Hours Answering Service [phone] / Main Palliative Clinic - [facility] [phone] / [facility] Inpatient Team Consult Pager [phone] (answered 8am-430pm M-F)    Attending Note:  Patient seen and evaluated with Dr Mota and I agree with/confirm his findings/recs in this note.     Thank you for involving us in the patient's care.   Lorenzo Trujillo MD / Palliative Medicine / Pager [phone] / [facility] Inpatient Team Consult Pager [phone] (answered 8am-430pm M-F) - ok to text page via [employer] / After-Hours Answering Service [phone] / Palliative Clinic in the [facility] - [phone] (scheduling); [phone] (triage).  3          Again, thank you for allowing me to participate in the care of your patient.      Sincerely,    Sukhdeep Mota MD

## 2018-01-26 PROBLEM — K56.609 LARGE BOWEL OBSTRUCTION (H): Status: ACTIVE | Noted: 2018-01-01

## 2018-01-26 NOTE — CONSULTS
Colon and Rectal Surgery PROGRESS  [facility]    Sebas Lopez MRN# [id number]   Age: 52 year old YOB: 1965     Date of Admission:  1/26/2018    Reason for consult: Recommendations       Requesting physician: ED       Level of consult: One-time consult to assist in determining a diagnosis and to recommend an appropriate treatment plan           Assessment:   52 year old with non-resectable diffuse adenocarcinoma involving small bowel, now sigmoid, with carcinomatosis, recently aborted plans for cytoreductive surgery/HIPEC given these findings and followed by Palliative Care who presents with abdominal pain and diffuse colonic dilation but is relatively comfortable, normal lactate, normal vitals and unconcerning labs with a relatively comfortable exam and would benefit from symptom management and possible interventions for symptoms relief.           Recommendations:   - Recommend admit to oncology and consideration of G tube and cecostomy tube by IR for relief of obstructive symptoms.  - Call with questions          History of Present Illness:   CC: abdominal pain, nausea     History is obtained from the patient    Mr. Lopez is a 52 year old with history of ulcerative colitis, adenocarcinoma with exploratory laparotomy in 7/2017 for obstruction that resulted in small bowel resection, end ileostomy, with exploratory laparotomy in December for planned cytoreductive surgery with HIPEC that was aborted due to diffuse small bowel carcinomatosis with miliary lesions. He is now s/p recent chemotherapy and also sees Palliative Care for symptom management. He complains of two days of abdominal pain/cramping and occasional nausea without emesis. He reports normal stool/gas output from ostomy and somewhat increased rectal stool without hematemesis, hematochezia, melena. He wonders if new octreotide injections taken to slow his ostomy output is causing this pain. At its worst it is 9/10,  at best it is minimal. It is somewhat relieved with stool from rectum. He has had no other recent changes in his health. He denies fevers/chills. He has not had any recent sick contacts.          Past Medical History:     Past Medical History:   Diagnosis Date     Adenocarcinoma of sigmoid colon (H)     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      HERNIA REPAIR       INSERT PORT VASCULAR ACCESS Right 8/10/2017    Procedure: INSERT PORT VASCULAR ACCESS;  Single Lumen Right Chest Power Port;  Surgeon: Malena Andrew PA-C;  [facility]     LAPAROSCOPY DIAGNOSTIC (GENERAL) N/A 12/6/2017    Procedure: LAPAROSCOPY DIAGNOSTIC (GENERAL);  Diagnostic Laparoscopy, Exploratory Laparotomy Anesthesia Block ;  Surgeon: Rob Dudley MD;  [facility]     LAPAROTOMY EXPLORATORY N/A 12/6/2017    Procedure: LAPAROTOMY EXPLORATORY;;  Surgeon: Rob Dudley MD;  Location: [facility]     ORTHOPEDIC SURGERY Left     HIP ARTHROPLASTY             Social History:     Social History     Social History     Marital status: Single     Spouse name: N/A     Number of children: N/A     Years of education: N/A     Occupational History     Not on file.     Social History Main Topics     Smoking status: Never Smoker     Smokeless tobacco: Never Used     Alcohol use 3.0 oz/week     5 Cans of beer per week      Comment: none for last 4 months     Drug use: No      Sexual activity: Not on file     Other Topics Concern     Not on file     Social History Narrative             Family History:     Family History   Problem Relation Age of Onset     Hypertension Father      DIABETES Father              Allergies:    No Known Allergies          Medications:     No current facility-administered medications on file prior to encounter.   Current Outpatient Prescriptions on File Prior to Encounter:  omeprazole (PRILOSEC) 20 MG CR capsule Take 1 capsule (20 mg) by mouth daily   loperamide (IMODIUM) 2 MG capsule Take 2 capsules (4 mg) by mouth 4 times daily as needed for diarrhea   opium tincture tincture Take 0.6 mLs (6 mg) by mouth 4 times daily   octreotide (SANDOSTATIN) 50 MCG/ML SOLN injection Inject 1 mL (50 mcg) Subcutaneous 3 times daily   oxyCODONE IR (ROXICODONE) 5 MG tablet Take 1-2 tablets (5-10 mg) by mouth every 3 hours as needed for moderate to severe pain (Patient not taking: Reported on 12/21/2017)   diphenoxylate-atropine (LOMOTIL) 2.5-0.025 MG per tablet Take 1-2 tablets by mouth 4 times daily as needed for diarrhea   amylase-lipase-protease (CREON) 23428 UNITS CPEP Take 2 capsules (72,000 Units) by mouth 3 times daily (with meals) And 1 capsule with each snack tid.   LORazepam (ATIVAN) 0.5 MG tablet Take 1 tablet (0.5 mg) by mouth every 4 hours as needed (Anxiety, Nausea/Vomiting or Sleep)   magnesium oxide (MAG-OX) 400 MG tablet Take 1 tablet (400 mg) by mouth 2 times daily   Potassium Chloride 40 MEQ/15ML (20%) SOLN Take 15 mLs (40 mEq) by mouth daily   Psyllium (METAMUCIL FIBER) 51.7 % PACK Take 1 packet by mouth 3 times daily   Ferrous Sulfate (IRON SUPPLEMENT PO) Take 325 mg by mouth 2 times daily (with meals)              Review of Systems:      All other review of systems negative, except for what is mentioned above        Physical Exam:   /83  Pulse 91  Temp 97.9  F (36.6  C) (Oral)  Resp 16  Wt 66.8 kg (147 lb 4.3 oz)  SpO2 100%  BMI 21.75  kg/m2  General: Pleasant, alert, interactive, NAD, wife at bedside.  Chest: NLB on RA  Abdomen: Soft, nondistended, minimal diffuse tenderness. Guarding on deep palpation without rebound tenderness  Extremities: No LE edema or obvious joint abnormalities  Skin: Warm and dry, no jaundice or rash  Neuro: A&Ox3, CN grossly in tact            Data:   Notable for Lactate 1.0, WBC of 10.9, baseline Creatinine     CT scan with 5x4.5x3.6 sigmoid mass causing significant distention of the cecum, diffuse colonic distention

## 2018-01-26 NOTE — DISCHARGE INSTRUCTIONS
Please make an appointment to follow up with Hematology Oncology Clinic (phone: [phone]) as soon as possible even if entirely better.

## 2018-01-26 NOTE — TELEPHONE ENCOUNTER
Received call from patient - he went to ED for abdominal pain and cramping. He tells me that the ED MD thought it could be related to octreotide, which he is now holding. He also received some PRN dilaudid. He tells me he is comfortable with this plan for now and follows up with oncology next week. Encouraged him to call with any questions/concerns/changes. Reminded him of my contact info and after hours phone #. Will try to call and check in next week.

## 2018-01-26 NOTE — ED NOTES
Arrived to ED d/t c/o abdominal pain and cramping, hx of colon cancer, not actively receiving chemo, has ileostomy, denies any changes in output, c/o nausea but denies vomiting, VSS upon arrival to ED

## 2018-01-26 NOTE — TELEPHONE ENCOUNTER
Reason for Disposition    [1] SEVERE pain (e.g., excruciating) AND [2] present > 1 hour     Surgery the beginning of December for colon cancer. Off of chemo for awhile. There is a scar and mesh in there from an hernia operation. Pain at a 9 when it triggers bad, down to 5.    Protocols used: ABDOMINAL PAIN - UPPER-ADULT-    They will be driving from [facility]. Level of pain is 9 when it hits otherwise around a 5, constant, since yesterday.  Deya Brown RN-BC  [facility]

## 2018-01-26 NOTE — ED PROVIDER NOTES
History     Chief Complaint   Patient presents with     Abdominal Pain     HPI  Sebas Lopez is a 52 year old male with a past medical history of metastatic cancer of sigmoid colon s/p diagnostic laparoscopy and exploratory laparotomy on 12/6/17, peritoneal carcinomatosis, ulcerative colitis, Lyme disease, and perthes disease who presents to the Emergency Department today for evaluation of abdominal pian. The patient states that yesterday he developed central abdominal pain that radiates into his lower abdomen. He states that the pain is a cramping pain that waxes and wanes in severity. The patient reports that it feels like a very bad gas pain. He woke up today and his pain was much more severe so he decided to present to our ED. He also notes that he has been nauseous due to the level of the pain and he has not been eating much because of the nausea and pain. He reports that his pain is currently low and and rates it at a 5/10. The patient notes that he had a past hernia surgery and still has some of the mesh in place and states that his pain is close to that site. The patient also reports a past ileostomy.     I have reviewed the Medications, Allergies, Past Medical and Surgical History, and Social History in the [employer] system.    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     Ulcerative colitis (H)        Past Surgical History:   Procedure Laterality Date     COLONOSCOPY  2017     COLONOSCOPY N/A 11/30/2017    Procedure: COLONOSCOPY;  Colonoscopy;  Surgeon: Rob Dudley MD;  Location:  [facility]     GI SURGERY  07/10/2017    Extensive lysis of adhesions, small bowel resection with end ileostomy.      HERNIA REPAIR        INSERT PORT VASCULAR ACCESS Right 8/10/2017    Procedure: INSERT PORT VASCULAR ACCESS;  Single Lumen Right Chest Power Port;  Surgeon: Malena Andrew PA-C;  [facility]     LAPAROSCOPY DIAGNOSTIC (GENERAL) N/A 12/6/2017    Procedure: LAPAROSCOPY DIAGNOSTIC (GENERAL);  Diagnostic Laparoscopy, Exploratory Laparotomy Anesthesia Block ;  Surgeon: Rob Dudley MD;  Location: [facility] OR     LAPAROTOMY EXPLORATORY N/A 12/6/2017    Procedure: LAPAROTOMY EXPLORATORY;;  Surgeon: Rob Dudley MD;  Location: [facility] OR     ORTHOPEDIC SURGERY Left     HIP ARTHROPLASTY       Family History   Problem Relation Age of Onset     Hypertension Father      DIABETES Father        Social History   Substance Use Topics     Smoking status: Never Smoker     Smokeless tobacco: Never Used     Alcohol use 3.0 oz/week     5 Cans of beer per week      Comment: none for last 4 months       Current Facility-Administered Medications   Medication     heparin 100 UNIT/ML injection 5 mL     Current Outpatient Prescriptions   Medication     HYDROmorphone (DILAUDID) 2 MG tablet     omeprazole (PRILOSEC) 20 MG CR capsule     loperamide (IMODIUM) 2 MG capsule     opium tincture tincture     octreotide (SANDOSTATIN) 50 MCG/ML SOLN injection     oxyCODONE IR (ROXICODONE) 5 MG tablet     diphenoxylate-atropine (LOMOTIL) 2.5-0.025 MG per tablet     amylase-lipase-protease (CREON) 14412 UNITS CPEP     LORazepam (ATIVAN) 0.5 MG tablet     magnesium oxide (MAG-OX) 400 MG tablet     Potassium Chloride 40 MEQ/15ML (20%) SOLN     Psyllium (METAMUCIL FIBER) 51.7 % PACK     Ferrous Sulfate (IRON SUPPLEMENT PO)      No Known Allergies     Review of Systems   Constitutional: Negative for fever.   Gastrointestinal: Positive for abdominal pain (central abdomen into lower abdomen) and nausea. Negative for vomiting.       Physical Exam   BP: 127/79  Pulse: 91  Heart Rate: 76  Temp: 97.9  F (36.6  C)  Resp: 16  Weight: 66.8 kg (147 lb 4.3 oz)  SpO2: 97  %      Physical Exam   Constitutional: He is oriented to person, place, and time. No distress.   HENT:   Head: Atraumatic.   Mouth/Throat: Oropharynx is clear and moist. No oropharyngeal exudate.   Eyes: Pupils are equal, round, and reactive to light. No scleral icterus.   Neck: Neck supple. No JVD present.   Cardiovascular: Normal rate, normal heart sounds and intact distal pulses.  Exam reveals no gallop and no friction rub.    No murmur heard.  Pulmonary/Chest: Effort normal and breath sounds normal. No respiratory distress. He has no wheezes. He has no rales. He exhibits no tenderness.   Abdominal: Soft. Bowel sounds are normal. He exhibits distension. He exhibits no mass. There is no tenderness. There is no rebound and no guarding.   Musculoskeletal: He exhibits no edema or tenderness.   Neurological: He is alert and oriented to person, place, and time. No cranial nerve deficit. Coordination normal.   Skin: Skin is warm. No rash noted. He is not diaphoretic.       ED Course   8:20 AM  The patient was seen and examined by Dr. Smith in Room 19.    ED Course     Procedures        Results for orders placed or performed during the hospital encounter of 01/26/18 (from the past 24 hour(s))   CBC with platelets differential     Status: Abnormal    Collection Time: 01/26/18  8:44 AM   Result Value Ref Range    WBC 10.9 4.0 - 11.0 10e9/L    RBC Count 3.77 (L) 4.4 - 5.9 10e12/L    Hemoglobin 10.9 (L) 13.3 - 17.7 g/dL    Hematocrit 34.4 (L) 40.0 - 53.0 %    MCV 91 78 - 100 fl    MCH 28.9 26.5 - 33.0 pg    MCHC 31.7 31.5 - 36.5 g/dL    RDW 16.6 (H) 10.0 - 15.0 %    Platelet Count 494 (H) 150 - 450 10e9/L    Diff Method Automated Method     % Neutrophils 80.6 %    % Lymphocytes 13.0 %    % Monocytes 5.6 %    % Eosinophils 0.3 %    % Basophils 0.2 %    % Immature Granulocytes 0.3 %    Nucleated RBCs 0 0 /100    Absolute Neutrophil 8.8 (H) 1.6 - 8.3 10e9/L    Absolute Lymphocytes 1.4 0.8 - 5.3 10e9/L    Absolute Monocytes 0.6  0.0 - 1.3 10e9/L    Absolute Eosinophils 0.0 0.0 - 0.7 10e9/L    Absolute Basophils 0.0 0.0 - 0.2 10e9/L    Abs Immature Granulocytes 0.0 0 - 0.4 10e9/L    Absolute Nucleated RBC 0.0    INR     Status: Abnormal    Collection Time: 01/26/18  8:44 AM   Result Value Ref Range    INR 1.25 (H) 0.86 - 1.14   Comprehensive metabolic panel     Status: Abnormal    Collection Time: 01/26/18  8:44 AM   Result Value Ref Range    Sodium 139 133 - 144 mmol/L    Potassium 3.7 3.4 - 5.3 mmol/L    Chloride 106 94 - 109 mmol/L    Carbon Dioxide 23 20 - 32 mmol/L    Anion Gap 10 3 - 14 mmol/L    Glucose 105 (H) 70 - 99 mg/dL    Urea Nitrogen 12 7 - 30 mg/dL    Creatinine 0.84 0.66 - 1.25 mg/dL    GFR Estimate >90 >60 mL/min/1.7m2    GFR Estimate If Black >90 >60 mL/min/1.7m2    Calcium 9.3 8.5 - 10.1 mg/dL    Bilirubin Total 0.6 0.2 - 1.3 mg/dL    Albumin 3.1 (L) 3.4 - 5.0 g/dL    Protein Total 8.0 6.8 - 8.8 g/dL    Alkaline Phosphatase 167 (H) 40 - 150 U/L    ALT 17 0 - 70 U/L    AST 12 0 - 45 U/L   Lipase     Status: None    Collection Time: 01/26/18  8:44 AM   Result Value Ref Range    Lipase 153 73 - 393 U/L   Lactic acid whole blood     Status: None    Collection Time: 01/26/18  8:44 AM   Result Value Ref Range    Lactic Acid 1.0 0.7 - 2.0 mmol/L   CT Abdomen Pelvis w Contrast     Status: Abnormal    Collection Time: 01/26/18 10:10 AM   Result Value Ref Range    Radiologist flags Colon obstruction (Urgent)     Narrative    EXAMINATION: CT ABDOMEN PELVIS W CONTRAST, 1/26/2018 10:10 AM    TECHNIQUE:  Helical CT images from the lung bases through the  symphysis pubis were obtained with IV contrast. Contrast dose: 91 cc  of isovue 370    COMPARISON: CT abdomen pelvis 11/6/2017    HISTORY: pain;  metastatic cancer of sigmoid colon status post  laparotomy on 12/6/2017, peritoneal carcinomatosis, ulcerative  colitis.    FINDINGS:    Abdomen and pelvis: Postoperative changes of small bowel resection and  lower quadrant ileostomy. 5.2 x  4.5 x 3.6 cm proximal sigmoid mass  causing colonic obstruction with significant distention of cecum (7 cm  in diameter), ascending colon and proximal transverse colon. No  pneumatosis or pneumoperitoneum. A small volume of ascites.  Redemonstration of findings concerning for peritoneal carcinomatosis  with peritoneal and omental implants particularly along the anterior  abdominal wall, increased from the prior study and also mesenteric  haziness as well as borderline enlarged retroperitoneal and mesenteric  lymph nodes.     Stable unremarkable appearance of the liver, gallbladder, adrenal  glands, spleen and pancreas. Decompressed small bowel loops. No  hydronephrosis. Unchanged few renal cysts. No abdominal aortic  aneurysm. Partially distended urinary bladder. Pelvic phleboliths.  Prostate calcifications. No inguinal lymphadenopathy. Pelvic  phleboliths.    Lung bases: Clear.    Bones and soft tissues: Left hip arthroplasty partially characterized  with the femoral head component well centered in the acetabulum.  Well-circumscribed 2.2 cm lucent focus in the left iliac bone,  superiorly to the acetabulum, stable since 6/21/2017. No acute or  aggressive appearing osseous lesion. Scattered sclerotic foci, stable,  likely representing benign bone islands. Degenerative changes of the  spine. Mild wedge compression deformity along the superior endplate of  the vertebral body T9, stable. Healing ventral incision. Old healed  left rib fractures.      Impression    IMPRESSION:   1. Colonic obstruction secondary to sigmoid adenocarcinoma without  pneumatosis or pneumoperitoneum.  2. Small volume of ascites with associated findings concerning for  peritoneal carcinomatosis, increased from the prior study..      [Urgent Result: Colon obstruction]    Finding was identified on 1/26/2018 10:19 AM.     Dr. Smith was contacted by Dr. Michelle Mcgraw M.D.  at 1/26/2018  10:37 AM and verbalized understanding of the urgent  finding.     I have personally reviewed the examination and initial interpretation  and I agree with the findings.    ISABELA MELENDEZ MD           Labs Ordered and Resulted from Time of ED Arrival Up to the Time of Departure from the ED   CBC WITH PLATELETS DIFFERENTIAL - Abnormal; Notable for the following:        Result Value    RBC Count 3.77 (*)     Hemoglobin 10.9 (*)     Hematocrit 34.4 (*)     RDW 16.6 (*)     Platelet Count 494 (*)     Absolute Neutrophil 8.8 (*)     All other components within normal limits   INR - Abnormal; Notable for the following:     INR 1.25 (*)     All other components within normal limits   COMPREHENSIVE METABOLIC PANEL - Abnormal; Notable for the following:     Glucose 105 (*)     Albumin 3.1 (*)     Alkaline Phosphatase 167 (*)     All other components within normal limits   LIPASE   LACTIC ACID WHOLE BLOOD   ROUTINE UA WITH MICROSCOPIC       Consults  Oncology: Responded (01/26/18 1216)  Surgery: Responded (colo/rectal) (01/26/18 1034)    Assessments & Plan (with Medical Decision Making)  Lower abd and distension from partial large bowel obstruction in the pt with colon Ca with peritoneal carcinomatosis s/p surgical resection with iliostomy per Dr Walton colorectal surgery, labs ok but CT with above findings-D/W Dr Walton-no surgical intervension at this point, possible cecotomy can be done per IR if possible, D/W Onc fellow and pt and family-trial of pain meds with some releif and ok to be DC'ed with close follow up. They will hold octreotide and dilaudid prn then follow up with Onc.       I have reviewed the nursing notes.    I have reviewed the findings, diagnosis, plan and need for follow up with the patient.    Discharge Medication List as of 1/26/2018  3:15 PM      START taking these medications    Details   HYDROmorphone (DILAUDID) 2 MG tablet Take 1 tablet (2 mg) by mouth every 4 hours as needed for pain maximum 4 tablet(s) per day, Disp-20 tablet, R-0, Local  Print             Final diagnoses:   Peritoneal carcinomatosis (H)   Abdominal pain, left lower quadrant   IJake, am serving as a trained medical scribe to document services personally performed by Irena Smith MD, based on the provider's statements to me.   Irena PERRIN MD, was physically present and have reviewed and verified the accuracy of this note documented by Jake Davenport.     1/26/2018   [facility], [place], EMERGENCY DEPARTMENT     Irena Smith MD  01/26/18 154

## 2018-01-26 NOTE — ED AVS SNAPSHOT
[facility], Emergency Department    [address] [facility] [address]    Phone:  [phone]                                       Sebas Lopez   MRN: [id number]    Department:  [facility], Emergency Department   Date of Visit:  1/26/2018           Patient Information     Date Of Birth          1965        Your diagnoses for this visit were:     Peritoneal carcinomatosis (H)     Abdominal pain, left lower quadrant        You were seen by Irena Smith MD.        Discharge Instructions       Please make an appointment to follow up with [facility] (phone: [phone]) as soon as possible even if entirely better.     Future Appointments        Provider Department Dept Phone Center    2/1/2018 11:40 AM [facility] CT ROOM 1 [facility] [phone] [facility]    2/1/2018 2:30 PM Albert Long MD [facility] [phone] [facility]      24 Hour Appointment Hotline       To make an appointment at any [facility], call [phone] ([phone]). If you don't have a family doctor or clinic, we will help you find one. [facility] are conveniently located to serve the needs of you and your family.             Review of your medicines      START taking        Dose / Directions Last dose taken    HYDROmorphone 2 MG tablet   Commonly known as:  DILAUDID   Dose:  2 mg   Quantity:  20 tablet        Take 1 tablet (2 mg) by mouth every 4 hours as needed for pain maximum 4 tablet(s) per day   Refills:  0          Our records show that you are taking the medicines listed below. If these are incorrect, please call your family doctor or clinic.        Dose / Directions Last dose taken    amylase-lipase-protease 44352 UNITS Cpep   Commonly known as:  CREON   Dose:  2 capsule   Quantity:  240 capsule        Take 2 capsules (72,000 Units) by mouth 3 times daily (with meals) And 1 capsule with each snack tid.   Refills:  3         diphenoxylate-atropine 2.5-0.025 MG per tablet   Commonly known as:  LOMOTIL   Dose:  1-2 tablet   Quantity:  240 tablet        Take 1-2 tablets by mouth 4 times daily as needed for diarrhea   Refills:  5        IRON SUPPLEMENT PO   Dose:  325 mg        Take 325 mg by mouth 2 times daily (with meals)   Refills:  0        loperamide 2 MG capsule   Commonly known as:  IMODIUM   Dose:  4 mg   Quantity:  240 capsule        Take 2 capsules (4 mg) by mouth 4 times daily as needed for diarrhea   Refills:  11        LORazepam 0.5 MG tablet   Commonly known as:  ATIVAN   Dose:  0.5 mg   Quantity:  30 tablet        Take 1 tablet (0.5 mg) by mouth every 4 hours as needed (Anxiety, Nausea/Vomiting or Sleep)   Refills:  5        magnesium oxide 400 MG tablet   Commonly known as:  MAG-OX   Dose:  400 mg   Quantity:  60 tablet        Take 1 tablet (400 mg) by mouth 2 times daily   Refills:  1        octreotide 50 MCG/ML Soln injection   Commonly known as:  sandoSTATIN   Dose:  50 mcg   Quantity:  90 mL        Inject 1 mL (50 mcg) Subcutaneous 3 times daily   Refills:  2        omeprazole 20 MG CR capsule   Commonly known as:  priLOSEC   Dose:  20 mg   Quantity:  30 capsule        Take 1 capsule (20 mg) by mouth daily   Refills:  11        opium tincture tincture   Dose:  6 mg   Quantity:  72 mL        Take 0.6 mLs (6 mg) by mouth 4 times daily   Refills:  0        oxyCODONE IR 5 MG tablet   Commonly known as:  ROXICODONE   Dose:  5-10 mg   Quantity:  25 tablet        Take 1-2 tablets (5-10 mg) by mouth every 3 hours as needed for moderate to severe pain   Refills:  0        Potassium Chloride 40 MEQ/15ML (20%) Soln   Dose:  40 mEq   Quantity:  450 mL        Take 15 mLs (40 mEq) by mouth daily   Refills:  3        Psyllium 51.7 % Pack   Commonly known as:  METAMUCIL FIBER   Dose:  1 packet   Quantity:  90 each        Take 1 packet by mouth 3 times daily   Refills:  3                Prescriptions were sent or printed at these  locations (1 Prescription)                   Other Prescriptions                Printed at Department/Unit printer (1 of 1)         HYDROmorphone (DILAUDID) 2 MG tablet                Procedures and tests performed during your visit     CBC with platelets differential    CT Abdomen Pelvis w Contrast    Comprehensive metabolic panel    INR    Lactic acid whole blood    Lipase      Orders Needing Specimen Collection     Ordered          01/26/18 0822  UA with Microscopic - STAT, [facility]: STAT, Needs to be Collected     Scheduled Task Status   01/26/18 0822 Collect UA with Microscopic Open   Order Class:  PCU Collect                  Pending Results     No orders found from 1/24/2018 to 1/27/2018.            Pending Culture Results     No orders found from 1/24/2018 to 1/27/2018.            Pending Results Instructions     If you had any lab results that were not finalized at the time of your Discharge, you can call the ED Lab Result RN at [phone]. You will be contacted by this team for any positive Lab results or changes in treatment. The nurses are available 7 days a week from 10A to 6:30P.  You can leave a message 24 hours per day and they will return your call.        Thank you for choosing [facility]       Thank you for choosing [facility] for your care. Our goal is always to provide you with excellent care. Hearing back from our patients is one way we can continue to improve our services. Please take a few minutes to complete the written survey that you may receive in the mail after you visit with us. Thank you!        [facility] Information     [facility] gives you secure access to your electronic health record. If you see a primary care provider, you can also send messages to your care team and make appointments. If you have questions, please call your primary care clinic.  If you do not have a primary care provider, please call [phone] and they will assist you.        Care EveryWhere ID     This is your Care  EveryWhere ID. This could be used by other organizations to access your [facility] medical records  [id number]        Equal Access to Services     SARAH DUNN : Cristina Funk, mario abernathy, cain saucedo, bhargav willis. So [facility] [phone].    ATENCIÓN: Si habla español, tiene a cid disposición servicios gratuitos de asistencia lingüística. Llame al [phone].    We comply with applicable federal civil rights laws and [place] laws. We do not discriminate on the basis of race, color, national origin, age, disability, sex, sexual orientation, or gender identity.            After Visit Summary       This is your record. Keep this with you and show to your community pharmacist(s) and doctor(s) at your next visit.

## 2018-01-26 NOTE — ED AVS SNAPSHOT
[facility], [place], Emergency Department    [address] [place] [address]    Phone:  [phone]                                       Sebas Lopez   MRN: [id number]    Department:  [facility], Emergency Department   Date of Visit:  1/26/2018           After Visit Summary Signature Page     I have received my discharge instructions, and my questions have been answered. I have discussed any challenges I see with this plan with the nurse or doctor.    ..........................................................................................................................................  Patient/Patient Representative Signature      ..........................................................................................................................................  Patient Representative Print Name and Relationship to Patient    ..................................................               ................................................  Date                                            Time    ..........................................................................................................................................  Reviewed by Signature/Title    ...................................................              ..............................................  Date                                                            Time

## 2018-01-29 NOTE — TELEPHONE ENCOUNTER
Pt called in to triage reporting he has not been eating much or drinking since 1/26. He was seen at the ED for abdominal pain and nausea, was given dilaudid with recommendation for cecotomy with IR. He has a FU with Dr. Long on 2/1 with CT CAP. Pt stated abdominal pain is still present, rated 2/10 with use of dilaudid prn. He estimates he only drinks about 10-12 oz a day. He does urinate 3-4x day and urine is not dark or foul smelling, BMs are regular. Stated his mouth does feel dry. Denied weakness, lightheadedness, sob, chest pain. Also asks since CT abd was done at the ED whether he still needs CT on Thursday.    Discussed with Dr. Long. Pt to see an ELIF with possible IVF today in clinic. ELIF may decide whether pt should only have a CT chest on Thursday, can call Dr. Long to discuss. Paged Jacque DE LUNA (pt lives in [place] and would need 2 hours traveling time).    Per Jacque: Recommends labs BMP and lactate, apt with Jacque at 2pm and Infusion for IVF. Spoke with pt and confirmed times: will schedule for 1:30 pm labs, 2 pm Jacque and 3:30 pm per Zeen[facility]-charge in Infusion. Message sent to schedulers.

## 2018-01-29 NOTE — LETTER
1/29/2018      RE: Sebas Lopez  [address] [place] [address]       Oncology/Hematology Visit Note  Jan 29, 2018    Reason for Visit: follow up of     History of Present Illness: Sebas Lopez is a 52 year old male with history of ulcerative colitis who presented with abdominal pain in May 2017. Imaging performed at that time showing sigmoid wall thickening with adjacent mesenteric lymphadenopathy with free fluid near the abdominal wall mess from prior hernia surgery. He underwent a colonoscopy that showed an obstructing sigmoid colon mass. Biopsy of the mass showing sigmoid adenocarcinoma. He then developed obstructive symptoms and underwent an exploratory laparotomy on 7/10/2017. During that procedure, he was found to have diffuse peritoneal carcinomatosis. Extensive lysis of adhesions was performed and a small bowel resection was performed with end ileostomy. The biopsies from multiple large peritoneal metastases were also positive for metastatic adenocarcinoma. He started FOLFOX (5-FU, oxaliplatin, leucovorin) on 8/11/17. He completed 6 cycles. He then underwent diagnostic laparoscopy and ex-lap on 12/6/17 with Dr. Dudley. HIPEC aborted due to diffuse carcinomatosis. He then presented to ED on 1/26 with abdominal pain and associated nausea. CT A/P on 1/26 with 5.2 x 4.5 x 3.6 cm proximal sigmoid mass causnig colonic obstriction with singificant distention of cecum (7cm in diameter), ascending colon and proximal transverse colon. No pneumatosis or pneumoperitoneum. Also small volume of ascites with associated findings concerning for peritoneal carcinomatosis, increased from prior studies. Dr. Dudley was consulted and recommended no surgical intervention, but possible cecotomy per IR if possible. Sebas was discharged home with trial of dilaudid prn. Octreotide held.    Interval History:  Sebas returns today. He states the pain in his abdomen has improved, but he has not been able to eat or  drink anything as this causes him to have abdominal cramping. He tells me he has not had anything to eat or drink since 1/26. He stopped all medications, including octreotide and has been taking nothing but dilaudid. He is taking 2mg about every 4 hours, although he states this does not fully relieve his pain and he has been waking at night in pain as well every 4 hours. He only tired lorazepam for nausea, which seemed to help. He states he only has about 1 cup of liquid ostomy output daily now, whereas before he had about 2000cc/day. He does not think his abdomen is more distended. He is voiding without difficulty and not oliguric. Denies any fever, chills, dizziness. No vomiting.    Current Outpatient Prescriptions   Medication Sig Dispense Refill     ondansetron (ZOFRAN-ODT) 8 MG ODT tab Take 1 tablet (8 mg) by mouth every 8 hours as needed for nausea 30 tablet 1     oxyCODONE (OXY-IR) 5 MG capsule Take 1-2 capsules (5-10 mg) by mouth every 4 hours as needed for moderate to severe pain 60 capsule 0     omeprazole (PRILOSEC) 20 MG CR capsule Take 1 capsule (20 mg) by mouth daily 30 capsule 11     loperamide (IMODIUM) 2 MG capsule Take 2 capsules (4 mg) by mouth 4 times daily as needed for diarrhea 240 capsule 11     opium tincture tincture Take 0.6 mLs (6 mg) by mouth 4 times daily 72 mL 0     diphenoxylate-atropine (LOMOTIL) 2.5-0.025 MG per tablet Take 1-2 tablets by mouth 4 times daily as needed for diarrhea 240 tablet 5     amylase-lipase-protease (CREON) 08726 UNITS CPEP Take 2 capsules (72,000 Units) by mouth 3 times daily (with meals) And 1 capsule with each snack tid. 240 capsule 3     LORazepam (ATIVAN) 0.5 MG tablet Take 1 tablet (0.5 mg) by mouth every 4 hours as needed (Anxiety, Nausea/Vomiting or Sleep) 30 tablet 5     magnesium oxide (MAG-OX) 400 MG tablet Take 1 tablet (400 mg) by mouth 2 times daily 60 tablet 1     Potassium Chloride 40 MEQ/15ML (20%) SOLN Take 15 mLs (40 mEq) by mouth daily 450 mL  "3     Psyllium (METAMUCIL FIBER) 51.7 % PACK Take 1 packet by mouth 3 times daily 90 each 3     Ferrous Sulfate (IRON SUPPLEMENT PO) Take 325 mg by mouth 2 times daily (with meals)        octreotide (SANDOSTATIN) 50 MCG/ML SOLN injection Inject 1 mL (50 mcg) Subcutaneous 3 times daily (Patient not taking: Reported on 1/29/2018) 90 mL 2       Physical Examination:  General: The patient is a pleasant male in no acute distress.  /85  Pulse 94  Temp 99.1  F (37.3  C) (Tympanic)  Resp 18  Ht 1.753 m (5' 9.02\")  Wt 64.5 kg (142 lb 3.2 oz)  SpO2 99%  BMI 20.99 kg/m2  Wt Readings from Last 10 Encounters:   01/29/18 64.5 kg (142 lb 3.2 oz)   01/26/18 66.8 kg (147 lb 4.3 oz)   01/25/18 67.1 kg (148 lb)   12/21/17 65.9 kg (145 lb 5 oz)   12/14/17 65.8 kg (145 lb 1.6 oz)   12/06/17 69.9 kg (154 lb 1.6 oz)   11/30/17 67.6 kg (149 lb)   11/29/17 67.9 kg (149 lb 9.6 oz)   11/20/17 68.6 kg (151 lb 4.8 oz)   11/09/17 68.3 kg (150 lb 9.6 oz)     HEENT: EOMI, PERRL. Sclerae are anicteric. Oral mucosa is pink and moist with no lesions or thrush.   Lymph: Neck is supple with no lymphadenopathy in the cervical or supraclavicular areas.   Heart: Regular rate and rhythm.   Lungs: Clear to auscultation bilaterally.   Abdomen: Bowel sounds present, soft, mildly tender diffusely. Mild distention. Ostomy bag in place with brown liquid stool.   Extremities: No lower extremity edema noted bilaterally.   Neuro: Cranial nerves II through XII are grossly intact.  Skin: No rashes, petechiae, or bruising noted on exposed skin.    Laboratory Data:  Results for orders placed or performed in visit on 01/29/18 (from the past 24 hour(s))   Basic metabolic panel   Result Value Ref Range    Sodium 128 (L) 133 - 144 mmol/L    Potassium 3.7 3.4 - 5.3 mmol/L    Chloride 94 94 - 109 mmol/L    Carbon Dioxide 24 20 - 32 mmol/L    Anion Gap 10 3 - 14 mmol/L    Glucose 94 70 - 99 mg/dL    Urea Nitrogen 21 7 - 30 mg/dL    Creatinine 1.08 0.66 - 1.25 " mg/dL    GFR Estimate 71 >60 mL/min/1.7m2    GFR Estimate If Black 86 >60 mL/min/1.7m2    Calcium 9.7 8.5 - 10.1 mg/dL   Lactate Dehydrogenase   Result Value Ref Range    Lactate Dehydrogenase 116 85 - 227 U/L   Lactic acid   Result Value Ref Range    Lactic Acid 1.5 0.4 - 2.0 mmol/L   *CBC with platelets differential   Result Value Ref Range    WBC 9.1 4.0 - 11.0 10e9/L    RBC Count 4.34 (L) 4.4 - 5.9 10e12/L    Hemoglobin 12.9 (L) 13.3 - 17.7 g/dL    Hematocrit 37.9 (L) 40.0 - 53.0 %    MCV 87 78 - 100 fl    MCH 29.7 26.5 - 33.0 pg    MCHC 34.0 31.5 - 36.5 g/dL    RDW 15.9 (H) 10.0 - 15.0 %    Platelet Count 442 150 - 450 10e9/L    Diff Method Automated Method     % Neutrophils 66.5 %    % Lymphocytes 18.9 %    % Monocytes 13.1 %    % Eosinophils 0.9 %    % Basophils 0.4 %    % Immature Granulocytes 0.2 %    Nucleated RBCs 0 0 /100    Absolute Neutrophil 6.0 1.6 - 8.3 10e9/L    Absolute Lymphocytes 1.7 0.8 - 5.3 10e9/L    Absolute Monocytes 1.2 0.0 - 1.3 10e9/L    Absolute Eosinophils 0.1 0.0 - 0.7 10e9/L    Absolute Basophils 0.0 0.0 - 0.2 10e9/L    Abs Immature Granulocytes 0.0 0 - 0.4 10e9/L    Absolute Nucleated RBC 0.0    Phosphorus   Result Value Ref Range    Phosphorus 3.9 2.5 - 4.5 mg/dL   Magnesium   Result Value Ref Range    Magnesium 1.7 1.6 - 2.3 mg/dL         Assessment and Plan:    Abdominal pain, nausea: He is still having some ostomy output, although decreased and nausea, but no vomiting. However, unable to eat or drink due to increased nausea and pain. Could be ileus secondary to peritoneal carcinomatosis, but concern for obstruction proximal to end ileostomy. Pain could also be secondary to mass seen above on CT, but unclear why he has worsening symptoms with po intake. Pain overall is improved, but he is also on q4h pain medication. BMP with mildly elevated Cr, but K, Mag, Phos WNL. He was able to eat some crackers after 2L IVF and Zofran.   --Will send Rx for Zofran 8mg ODT q8h prn. Will also  change dilaudid to oxycodone 5-10 mg po q4h prn to see if he has better pain control. His insurance limits him to 8 tablets/day  --Will have him return for IVF and anti-emetics on Wednesday AM. He will attempt to eat/drink in small amounts and see if pain and nausea under improved control. Cautioned him to eat small amounts and slowly, and to stop if symptoms are refractory to measures above. He will record ostomy output daily.    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 S/p ex lap 7/2017 with small bowel resection and end ileostomy. S/p 6 cycles of FOLFOX. Admitted on 12/6/2017 for ex-lap with aborted HIPEC due to diffuse carcinomatosis.   --Next generation sequencing--no mutations were identified in analyzed regions of KRAS, NRAS, BRAF, HRAS or PIK3CA. Therefore, this patient may be eligible for anti-EGFR antibody therapy if clinically indicated   --MSI testing with no microsatellite instability.  --He will return to see Dr. Long on 2/1. As he already has had CT A/P, will d/c CT CAP order and change to chest CT only.      Jacque Dale PA-C  [facility]  [address] [place] [address] [phone]      ANNAMARIE Celestin

## 2018-01-29 NOTE — PROGRESS NOTES
Infusion Nursing Note:  Sebas Lopez presents today for fluids.    Patient seen by provider today: No   present during visit today: Not Applicable.    Note: Patient received 2L NS and IV Zofran over 2 hours.    Intravenous Access:  Implanted Port.    Treatment Conditions:  Lab Results   Component Value Date    HGB 12.9 01/29/2018     Lab Results   Component Value Date    WBC 9.1 01/29/2018      Lab Results   Component Value Date    ANEU 6.0 01/29/2018     Lab Results   Component Value Date     01/29/2018      Lab Results   Component Value Date     01/29/2018                   Lab Results   Component Value Date    POTASSIUM 3.7 01/29/2018           Lab Results   Component Value Date    MAG 1.7 01/29/2018            Lab Results   Component Value Date    CR 1.08 01/29/2018                   Lab Results   Component Value Date    ANNAMARIE 9.7 01/29/2018                Lab Results   Component Value Date    BILITOTAL 0.6 01/26/2018           Lab Results   Component Value Date    ALBUMIN 3.1 01/26/2018                    Lab Results   Component Value Date    ALT 17 01/26/2018           Lab Results   Component Value Date    AST 12 01/26/2018       Results reviewed, labs MET treatment parameters, ok to proceed with treatment.      Post Infusion Assessment:  Patient tolerated infusion without incident.  Site patent and intact, free from redness, edema or discomfort.    Discharge Plan:   Discharge instructions reviewed with: Patient.  Patient discharged in stable condition accompanied by: mother and father.  Departure Mode: Ambulatory.    Vandana Kelley RN

## 2018-01-29 NOTE — Clinical Note
1/29/2018       RE: Sebas Lopez  [address]  [place] [address]     Dear Colleague,    Thank you for referring your patient, Sebas Lopez, to the [facility]. Please see a copy of my visit note below.    No notes on file    Again, thank you for allowing me to participate in the care of your patient.      Sincerely,    ANNAMARIE Celestin

## 2018-01-29 NOTE — MR AVS SNAPSHOT
After Visit Summary   1/29/2018    Sebas Lopez    MRN: [id number]           Patient Information     Date Of Birth          1965        Visit Information        Provider Department      1/29/2018 3:30 PM UC 21 ATC; [facility] ONCOLOGY INFUSION [facility]        Today's Diagnoses     Peritoneal carcinomatosis (H)    -  1       Follow-ups after your visit        Your next 10 appointments already scheduled     Feb 01, 2018 11:40 AM CST   (Arrive by 11:25 AM)   CT CHEST ABDOMEN PELVIS W/O & W CONTRAST with UCCT2   [facility] [place] [facility] ([facility])    [address] [address] [phone]           Please bring any scans or X-rays taken at other hospitals, if similar tests were done. Also bring a list of your medicines, including vitamins, minerals and over-the-counter drugs. It is safest to leave personal items at home.  Be sure to tell your doctor:   If you have any allergies.   If there s any chance you are pregnant.   If you are breastfeeding.   If you have any special needs.  You may have contrast for this exam. To prepare:   Do not eat or drink for 2 hours before your exam. If you need to take medicine, you may take it with small sips of water. (We may ask you to take liquid medicine as well.)   The day before your exam, drink extra fluids at least six 8-ounce glasses (unless your doctor tells you to restrict your fluids).  Patients over 70 or patients with diabetes or kidney problems:   If you haven t had a blood test (creatinine test) within the last 30 days, go to your clinic or Diagnostic Imaging Department for this test.  If you have diabetes:   If your kidney function is normal, continue taking your metformin (Avandamet, Glucophage, Glucovance, Metaglip) on the day of your exam.   If your kidney function is abnormal, wait 48 hours before restarting this medicine.  You will have oral contrast for this exam:    You will drink the contrast at home. Get this from your clinic or Diagnostic Imaging Department. Please follow the directions given.  Please wear loose clothing, such as a sweat suit or jogging clothes. Avoid snaps, zippers and other metal. We may ask you to undress and put on a hospital gown.  If you have any questions, please call the Imaging Department where you will have your exam.            Feb 01, 2018  2:30 PM CST   (Arrive by 2:15 PM)   Return Visit with Albert Long MD   [facility] ([facility] [place])    [address]  [address]  [facility] [address] [phone]              Future tests that were ordered for you today     Open Future Orders        Priority Expected Expires Ordered    CT Chest w contrast Routine 2/1/2018 1/29/2019 1/29/2018            Who to contact     If you have questions or need follow up information about today's clinic visit or your schedule please contact [facility] directly at [phone].  Normal or non-critical lab and imaging results will be communicated to you by MyChart, letter or phone within 4 business days after the clinic has received the results. If you do not hear from us within 7 days, please contact the clinic through [facility] or phone. If you have a critical or abnormal lab result, we will notify you by phone as soon as possible.  Submit refill requests through [facility] or call your pharmacy and they will forward the refill request to us. Please allow 3 business days for your refill to be completed.          Additional Information About Your Visit        [employer] Information     [facility] gives you secure access to your electronic health record. If you see a primary care provider, you can also send messages to your care team and make appointments. If you have questions, please call your primary care clinic.  If you do not have a primary care provider, please call [phone] and they will assist  you.        Care EveryWhere ID     This is your Care EveryWhere ID. This could be used by other organizations to access your [facility] medical records  [id number]         Blood Pressure from Last 3 Encounters:   01/29/18 127/85   01/26/18 124/89   01/25/18 113/76    Weight from Last 3 Encounters:   01/29/18 64.5 kg (142 lb 3.2 oz)   01/26/18 66.8 kg (147 lb 4.3 oz)   01/25/18 67.1 kg (148 lb)              Today, you had the following     No orders found for display         Today's Medication Changes          These changes are accurate as of 1/29/18  5:59 PM.  If you have any questions, ask your nurse or doctor.               Start taking these medicines.        Dose/Directions    ondansetron 8 MG ODT tab   Commonly known as:  ZOFRAN-ODT   Used for:  Nausea   Started by:  Jacque Dale PA        Dose:  8 mg   Take 1 tablet (8 mg) by mouth every 8 hours as needed for nausea   Quantity:  30 tablet   Refills:  1       oxyCODONE 5 MG capsule   Commonly known as:  OXY-IR   Used for:  Peritoneal carcinomatosis (H), Cancer of sigmoid colon (H)   Replaces:  oxyCODONE IR 5 MG tablet   Started by:  Jacque Dale PA        Dose:  5-10 mg   Take 1-2 capsules (5-10 mg) by mouth every 4 hours as needed for moderate to severe pain   Quantity:  60 capsule   Refills:  0         Stop taking these medicines if you haven't already. Please contact your care team if you have questions.     HYDROmorphone 2 MG tablet   Commonly known as:  DILAUDID   Stopped by:  Jacque Dale PA           oxyCODONE IR 5 MG tablet   Commonly known as:  ROXICODONE   Replaced by:  oxyCODONE 5 MG capsule   Stopped by:  Jacque Dale PA                Where to get your medicines      These medications were sent to [facility] [place], [place] - [address] [facility] [address]  [address] [facility] [address] [address]    Hours:  TRANSPLANT PHONE NUMBER [phone] Phone:  [phone]     ondansetron 8 MG ODT tab          Some of these will need a paper prescription and others can be bought over the counter.  Ask your nurse if you have questions.     Bring a paper prescription for each of these medications     oxyCODONE 5 MG capsule                Primary Care Provider Office Phone # Fax #    Jaguar Becker [phone]       [facility] [address]  [place] [address]        Equal Access to Services     [facility]: Hadii aad ku hadasho Soomaali, waaxda luqadaha, qaybta kaalmada adeegyada, waxay idiin hayaan adeeg kharash la'aan ah. So [facility] [phone].    ATENCIÓN: Si habla español, tiene a cid disposición servicios gratuitos de asistencia lingüística. Llame al [phone].    We comply with applicable federal civil rights laws and [place] laws. We do not discriminate on the basis of race, color, national origin, age, disability, sex, sexual orientation, or gender identity.            Thank you!     Thank you for choosing [facility]  for your care. Our goal is always to provide you with excellent care. Hearing back from our patients is one way we can continue to improve our services. Please take a few minutes to complete the written survey that you may receive in the mail after your visit with us. Thank you!             Your Updated Medication List - Protect others around you: Learn how to safely use, store and throw away your medicines at www.disposemymeds.org.          This list is accurate as of 1/29/18  5:59 PM.  Always use your most recent med list.                   Brand Name Dispense Instructions for use Diagnosis    amylase-lipase-protease 06653 UNITS Cpep    CREON    240 capsule    Take 2 capsules (72,000 Units) by mouth 3 times daily (with meals) And 1 capsule with each snack tid.    Diarrhea, unspecified type, Peritoneal carcinomatosis (H), Cancer of sigmoid colon (H)       diphenoxylate-atropine 2.5-0.025 MG per tablet    LOMOTIL    240 tablet    Take 1-2 tablets  by mouth 4 times daily as needed for diarrhea    Diarrhea due to drug       IRON SUPPLEMENT PO      Take 325 mg by mouth 2 times daily (with meals)        loperamide 2 MG capsule    IMODIUM    240 capsule    Take 2 capsules (4 mg) by mouth 4 times daily as needed for diarrhea    Diarrhea, unspecified type       LORazepam 0.5 MG tablet    ATIVAN    30 tablet    Take 1 tablet (0.5 mg) by mouth every 4 hours as needed (Anxiety, Nausea/Vomiting or Sleep)    Peritoneal carcinomatosis (H), Cancer of sigmoid colon (H)       magnesium oxide 400 MG tablet    MAG-OX    60 tablet    Take 1 tablet (400 mg) by mouth 2 times daily    Hypomagnesemia       octreotide 50 MCG/ML Soln injection    sandoSTATIN    90 mL    Inject 1 mL (50 mcg) Subcutaneous 3 times daily    Diarrhea, unspecified type       omeprazole 20 MG CR capsule    priLOSEC    30 capsule    Take 1 capsule (20 mg) by mouth daily    Diarrhea, unspecified type       ondansetron 8 MG ODT tab    ZOFRAN-ODT    30 tablet    Take 1 tablet (8 mg) by mouth every 8 hours as needed for nausea    Nausea       opium tincture tincture     72 mL    Take 0.6 mLs (6 mg) by mouth 4 times daily    Diarrhea, unspecified type, Cancer of sigmoid colon (H), Peritoneal carcinomatosis (H)       oxyCODONE 5 MG capsule    OXY-IR    60 capsule    Take 1-2 capsules (5-10 mg) by mouth every 4 hours as needed for moderate to severe pain    Peritoneal carcinomatosis (H), Cancer of sigmoid colon (H)       Potassium Chloride 40 MEQ/15ML (20%) Soln     450 mL    Take 15 mLs (40 mEq) by mouth daily    Hypokalemia       Psyllium 51.7 % Pack    METAMUCIL FIBER    90 each    Take 1 packet by mouth 3 times daily    Diarrhea, unspecified type

## 2018-01-29 NOTE — NURSING NOTE
"Oncology Rooming Note    January 29, 2018 1:43 PM   Sebas Lopez is a 52 year old male who presents for:    Chief Complaint   Patient presents with     Port Draw     VS done, labs collected via portacath with power needle by RN, heparin locked.  Hx colon CA.     Oncology Clinic Visit     Return: Colon CA     Initial Vitals: /85  Pulse 94  Temp 99.1  F (37.3  C) (Tympanic)  Resp 18  Ht 1.753 m (5' 9.02\")  Wt 64.5 kg (142 lb 3.2 oz)  SpO2 99%  BMI 20.99 kg/m2 Estimated body mass index is 20.99 kg/(m^2) as calculated from the following:    Height as of this encounter: 1.753 m (5' 9.02\").    Weight as of this encounter: 64.5 kg (142 lb 3.2 oz). Body surface area is 1.77 meters squared.  Moderate Pain (5) Comment: Data Unavailable   No LMP for male patient.  Allergies reviewed: Yes  Medications reviewed: Yes    Medications: Medication refills not needed today.  Pharmacy name entered into [facility]: [facility] [place] - [address]    Clinical concerns: pain level 5 in abdomen.  I informed pt to remind the Provider/ELIF about  pain level in case i dont touch bases with them, if the provider was in the exam room while i attend on rooming the next pt. Pt verbalized understandings.  Carlie De La Paz CMA  Patient was offered a flu vaccine today but declined.       6 minutes for nursing intake (face to face time)     Carlie De La Paz CMA                  "

## 2018-01-29 NOTE — NURSING NOTE
Chief Complaint   Patient presents with     Port Draw     VS done, labs collected via portacath with power needle by RN, heparin locked.  Hx colon CA.

## 2018-01-29 NOTE — MR AVS SNAPSHOT
After Visit Summary   1/29/2018    Sebas Lopez    MRN: [id number]           Patient Information     Date Of Birth          1965        Visit Information        Provider Department      1/29/2018 2:00 PM Jacque Dale PA [facility]        Today's Diagnoses     Peritoneal carcinomatosis (H)    -  1    Cancer of sigmoid colon (H)        Nausea        Hypokalemia        Diarrhea, unspecified type           Follow-ups after your visit        Your next 10 appointments already scheduled     Feb 01, 2018 11:40 AM CST   (Arrive by 11:25 AM)   CT CHEST ABDOMEN PELVIS W/O & W CONTRAST with UCCT2   [facility] [place] [facility] ([facility])    [address]  1st Floor  [place] [address] [phone]           Please bring any scans or X-rays taken at other hospitals, if similar tests were done. Also bring a list of your medicines, including vitamins, minerals and over-the-counter drugs. It is safest to leave personal items at home.  Be sure to tell your doctor:   If you have any allergies.   If there s any chance you are pregnant.   If you are breastfeeding.   If you have any special needs.  You may have contrast for this exam. To prepare:   Do not eat or drink for 2 hours before your exam. If you need to take medicine, you may take it with small sips of water. (We may ask you to take liquid medicine as well.)   The day before your exam, drink extra fluids at least six 8-ounce glasses (unless your doctor tells you to restrict your fluids).  Patients over 70 or patients with diabetes or kidney problems:   If you haven t had a blood test (creatinine test) within the last 30 days, go to your clinic or Diagnostic Imaging Department for this test.  If you have diabetes:   If your kidney function is normal, continue taking your metformin (Avandamet, Glucophage, Glucovance, Metaglip) on the day of your exam.   If your kidney function is abnormal,  wait 48 hours before restarting this medicine.  You will have oral contrast for this exam:   You will drink the contrast at home. Get this from your clinic or Diagnostic Imaging Department. Please follow the directions given.  Please wear loose clothing, such as a sweat suit or jogging clothes. Avoid snaps, zippers and other metal. We may ask you to undress and put on a hospital gown.  If you have any questions, please call the Imaging Department where you will have your exam.            Feb 01, 2018  2:30 PM CST   (Arrive by 2:15 PM)   Return Visit with Albert Long MD   [facility] ([facility]    [address]  [address]  [place] [address] [phone]              Future tests that were ordered for you today     Open Future Orders        Priority Expected Expires Ordered    CT Chest w contrast Routine 2/1/2018 1/29/2019 1/29/2018            Who to contact     If you have questions or need follow up information about today's clinic visit or your schedule please contact [facility] directly at [phone].  Normal or non-critical lab and imaging results will be communicated to you by MyChart, letter or phone within 4 business days after the clinic has received the results. If you do not hear from us within 7 days, please contact the clinic through [employer] or phone. If you have a critical or abnormal lab result, we will notify you by phone as soon as possible.  Submit refill requests through [employer] or call your pharmacy and they will forward the refill request to us. Please allow 3 business days for your refill to be completed.          Additional Information About Your Visit        [employer] Information     [employer] gives you secure access to your electronic health record. If you see a primary care provider, you can also send messages to your care team and make appointments. If you have questions, please call your primary care clinic.   "If you do not have a primary care provider, please call [phone] and they will assist you.        Care EveryWhere ID     This is your Care EveryWhere ID. This could be used by other organizations to access your [place] medical records  [id number]        Your Vitals Were     Pulse Temperature Respirations Height Pulse Oximetry BMI (Body Mass Index)    94 99.1  F (37.3  C) (Tympanic) 18 1.753 m (5' 9.02\") 99% 20.99 kg/m2       Blood Pressure from Last 3 Encounters:   01/29/18 127/85   01/26/18 124/89   01/25/18 113/76    Weight from Last 3 Encounters:   01/29/18 64.5 kg (142 lb 3.2 oz)   01/26/18 66.8 kg (147 lb 4.3 oz)   01/25/18 67.1 kg (148 lb)              We Performed the Following     *CBC with platelets differential     Basic metabolic panel     Lactate Dehydrogenase     Lactic acid     Magnesium     Osmolality     Phosphorus          Today's Medication Changes          These changes are accurate as of 1/29/18  6:38 PM.  If you have any questions, ask your nurse or doctor.               Start taking these medicines.        Dose/Directions    ondansetron 8 MG ODT tab   Commonly known as:  ZOFRAN-ODT   Used for:  Nausea   Started by:  Jacque Dale PA        Dose:  8 mg   Take 1 tablet (8 mg) by mouth every 8 hours as needed for nausea   Quantity:  30 tablet   Refills:  1       oxyCODONE 5 MG capsule   Commonly known as:  OXY-IR   Used for:  Peritoneal carcinomatosis (H), Cancer of sigmoid colon (H)   Replaces:  oxyCODONE IR 5 MG tablet   Started by:  Jacque Dale PA        Dose:  5-10 mg   Take 1-2 capsules (5-10 mg) by mouth every 4 hours as needed for moderate to severe pain   Quantity:  60 capsule   Refills:  0         Stop taking these medicines if you haven't already. Please contact your care team if you have questions.     HYDROmorphone 2 MG tablet   Commonly known as:  DILAUDID   Stopped by:  Jacque Dale PA           oxyCODONE IR 5 MG tablet   Commonly known as:  ROXICODONE "   Replaced by:  oxyCODONE 5 MG capsule   Stopped by:  Jacque Dale PA                Where to get your medicines      These medications were sent to [place], [place] - [address]  [address], [place] [address]    Hours:  TRANSPLANT PHONE NUMBER [phone] Phone:  [phone]     ondansetron 8 MG ODT tab         Some of these will need a paper prescription and others can be bought over the counter.  Ask your nurse if you have questions.     Bring a paper prescription for each of these medications     oxyCODONE 5 MG capsule                Primary Care Provider Office Phone # Fax #    Jaguar RADHA Becker [phone]       [address] [address]        Equal Access to Services     SARAH DUNN : Hadii karrie reddy hadasho Soomaali, waaxda luqadaha, qaybta kaalmada adeegyada, bhargav sage . So [facility] [phone].    ATENCIÓN: Si habla español, tiene a cid disposición servicios gratuitos de asistencia lingüística. Kenan al [phone].    We comply with applicable federal civil rights laws and [place] laws. We do not discriminate on the basis of race, color, national origin, age, disability, sex, sexual orientation, or gender identity.            Thank you!     Thank you for choosing [facility]  for your care. Our goal is always to provide you with excellent care. Hearing back from our patients is one way we can continue to improve our services. Please take a few minutes to complete the written survey that you may receive in the mail after your visit with us. Thank you!             Your Updated Medication List - Protect others around you: Learn how to safely use, store and throw away your medicines at www.disposemymeds.org.          This list is accurate as of 1/29/18  6:38 PM.  Always use your most recent med list.                   Brand Name Dispense  Instructions for use Diagnosis    amylase-lipase-protease 15834 UNITS Cpep    CREON    240 capsule    Take 2 capsules (72,000 Units) by mouth 3 times daily (with meals) And 1 capsule with each snack tid.    Diarrhea, unspecified type, Peritoneal carcinomatosis (H), Cancer of sigmoid colon (H)       diphenoxylate-atropine 2.5-0.025 MG per tablet    LOMOTIL    240 tablet    Take 1-2 tablets by mouth 4 times daily as needed for diarrhea    Diarrhea due to drug       IRON SUPPLEMENT PO      Take 325 mg by mouth 2 times daily (with meals)        loperamide 2 MG capsule    IMODIUM    240 capsule    Take 2 capsules (4 mg) by mouth 4 times daily as needed for diarrhea    Diarrhea, unspecified type       LORazepam 0.5 MG tablet    ATIVAN    30 tablet    Take 1 tablet (0.5 mg) by mouth every 4 hours as needed (Anxiety, Nausea/Vomiting or Sleep)    Peritoneal carcinomatosis (H), Cancer of sigmoid colon (H)       magnesium oxide 400 MG tablet    MAG-OX    60 tablet    Take 1 tablet (400 mg) by mouth 2 times daily    Hypomagnesemia       octreotide 50 MCG/ML Soln injection    sandoSTATIN    90 mL    Inject 1 mL (50 mcg) Subcutaneous 3 times daily    Diarrhea, unspecified type       omeprazole 20 MG CR capsule    priLOSEC    30 capsule    Take 1 capsule (20 mg) by mouth daily    Diarrhea, unspecified type       ondansetron 8 MG ODT tab    ZOFRAN-ODT    30 tablet    Take 1 tablet (8 mg) by mouth every 8 hours as needed for nausea    Nausea       opium tincture tincture     72 mL    Take 0.6 mLs (6 mg) by mouth 4 times daily    Diarrhea, unspecified type, Cancer of sigmoid colon (H), Peritoneal carcinomatosis (H)       oxyCODONE 5 MG capsule    OXY-IR    60 capsule    Take 1-2 capsules (5-10 mg) by mouth every 4 hours as needed for moderate to severe pain    Peritoneal carcinomatosis (H), Cancer of sigmoid colon (H)       Potassium Chloride 40 MEQ/15ML (20%) Soln     450 mL    Take 15 mLs (40 mEq) by mouth daily    Hypokalemia        Psyllium 51.7 % Pack    METAMUCIL FIBER    90 each    Take 1 packet by mouth 3 times daily    Diarrhea, unspecified type

## 2018-01-30 NOTE — PROGRESS NOTES
Oncology/Hematology Visit Note  Jan 29, 2018    Reason for Visit: follow up of     History of Present Illness: Sebas Lopez is a 52 year old male with history of ulcerative colitis who presented with abdominal pain in May 2017. Imaging performed at that time showing sigmoid wall thickening with adjacent mesenteric lymphadenopathy with free fluid near the abdominal wall mess from prior hernia surgery. He underwent a colonoscopy that showed an obstructing sigmoid colon mass. Biopsy of the mass showing sigmoid adenocarcinoma. He then developed obstructive symptoms and underwent an exploratory laparotomy on 7/10/2017. During that procedure, he was found to have diffuse peritoneal carcinomatosis. Extensive lysis of adhesions was performed and a small bowel resection was performed with end ileostomy. The biopsies from multiple large peritoneal metastases were also positive for metastatic adenocarcinoma. He started FOLFOX (5-FU, oxaliplatin, leucovorin) on 8/11/17. He completed 6 cycles. He then underwent diagnostic laparoscopy and ex-lap on 12/6/17 with Dr. Dudley. HIPEC aborted due to diffuse carcinomatosis. He then presented to ED on 1/26 with abdominal pain and associated nausea. CT A/P on 1/26 with 5.2 x 4.5 x 3.6 cm proximal sigmoid mass causnig colonic obstriction with singificant distention of cecum (7cm in diameter), ascending colon and proximal transverse colon. No pneumatosis or pneumoperitoneum. Also small volume of ascites with associated findings concerning for peritoneal carcinomatosis, increased from prior studies. Dr. Dudley was consulted and recommended no surgical intervention, but possible cecotomy per IR if possible. Sebas was discharged home with trial of dilaudid prn. Octreotide held.    Interval History:  Sebas returns today. He states the pain in his abdomen has improved, but he has not been able to eat or drink anything as this causes him to have abdominal cramping. He tells me he has  not had anything to eat or drink since 1/26. He stopped all medications, including octreotide and has been taking nothing but dilaudid. He is taking 2mg about every 4 hours, although he states this does not fully relieve his pain and he has been waking at night in pain as well every 4 hours. He only tired lorazepam for nausea, which seemed to help. He states he only has about 1 cup of liquid ostomy output daily now, whereas before he had about 2000cc/day. He does not think his abdomen is more distended. He is voiding without difficulty and not oliguric. Denies any fever, chills, dizziness. No vomiting.    Current Outpatient Prescriptions   Medication Sig Dispense Refill     ondansetron (ZOFRAN-ODT) 8 MG ODT tab Take 1 tablet (8 mg) by mouth every 8 hours as needed for nausea 30 tablet 1     oxyCODONE (OXY-IR) 5 MG capsule Take 1-2 capsules (5-10 mg) by mouth every 4 hours as needed for moderate to severe pain 60 capsule 0     omeprazole (PRILOSEC) 20 MG CR capsule Take 1 capsule (20 mg) by mouth daily 30 capsule 11     loperamide (IMODIUM) 2 MG capsule Take 2 capsules (4 mg) by mouth 4 times daily as needed for diarrhea 240 capsule 11     opium tincture tincture Take 0.6 mLs (6 mg) by mouth 4 times daily 72 mL 0     diphenoxylate-atropine (LOMOTIL) 2.5-0.025 MG per tablet Take 1-2 tablets by mouth 4 times daily as needed for diarrhea 240 tablet 5     amylase-lipase-protease (CREON) 61242 UNITS CPEP Take 2 capsules (72,000 Units) by mouth 3 times daily (with meals) And 1 capsule with each snack tid. 240 capsule 3     LORazepam (ATIVAN) 0.5 MG tablet Take 1 tablet (0.5 mg) by mouth every 4 hours as needed (Anxiety, Nausea/Vomiting or Sleep) 30 tablet 5     magnesium oxide (MAG-OX) 400 MG tablet Take 1 tablet (400 mg) by mouth 2 times daily 60 tablet 1     Potassium Chloride 40 MEQ/15ML (20%) SOLN Take 15 mLs (40 mEq) by mouth daily 450 mL 3     Psyllium (METAMUCIL FIBER) 51.7 % PACK Take 1 packet by mouth 3 times  "daily 90 each 3     Ferrous Sulfate (IRON SUPPLEMENT PO) Take 325 mg by mouth 2 times daily (with meals)        octreotide (SANDOSTATIN) 50 MCG/ML SOLN injection Inject 1 mL (50 mcg) Subcutaneous 3 times daily (Patient not taking: Reported on 1/29/2018) 90 mL 2       Physical Examination:  General: The patient is a pleasant male in no acute distress.  /85  Pulse 94  Temp 99.1  F (37.3  C) (Tympanic)  Resp 18  Ht 1.753 m (5' 9.02\")  Wt 64.5 kg (142 lb 3.2 oz)  SpO2 99%  BMI 20.99 kg/m2  Wt Readings from Last 10 Encounters:   01/29/18 64.5 kg (142 lb 3.2 oz)   01/26/18 66.8 kg (147 lb 4.3 oz)   01/25/18 67.1 kg (148 lb)   12/21/17 65.9 kg (145 lb 5 oz)   12/14/17 65.8 kg (145 lb 1.6 oz)   12/06/17 69.9 kg (154 lb 1.6 oz)   11/30/17 67.6 kg (149 lb)   11/29/17 67.9 kg (149 lb 9.6 oz)   11/20/17 68.6 kg (151 lb 4.8 oz)   11/09/17 68.3 kg (150 lb 9.6 oz)     HEENT: EOMI, PERRL. Sclerae are anicteric. Oral mucosa is pink and moist with no lesions or thrush.   Lymph: Neck is supple with no lymphadenopathy in the cervical or supraclavicular areas.   Heart: Regular rate and rhythm.   Lungs: Clear to auscultation bilaterally.   Abdomen: Bowel sounds present, soft, mildly tender diffusely. Mild distention. Ostomy bag in place with brown liquid stool.   Extremities: No lower extremity edema noted bilaterally.   Neuro: Cranial nerves II through XII are grossly intact.  Skin: No rashes, petechiae, or bruising noted on exposed skin.    Laboratory Data:  Results for orders placed or performed in visit on 01/29/18 (from the past 24 hour(s))   Basic metabolic panel   Result Value Ref Range    Sodium 128 (L) 133 - 144 mmol/L    Potassium 3.7 3.4 - 5.3 mmol/L    Chloride 94 94 - 109 mmol/L    Carbon Dioxide 24 20 - 32 mmol/L    Anion Gap 10 3 - 14 mmol/L    Glucose 94 70 - 99 mg/dL    Urea Nitrogen 21 7 - 30 mg/dL    Creatinine 1.08 0.66 - 1.25 mg/dL    GFR Estimate 71 >60 mL/min/1.7m2    GFR Estimate If Black 86 >60 " mL/min/1.7m2    Calcium 9.7 8.5 - 10.1 mg/dL   Lactate Dehydrogenase   Result Value Ref Range    Lactate Dehydrogenase 116 85 - 227 U/L   Lactic acid   Result Value Ref Range    Lactic Acid 1.5 0.4 - 2.0 mmol/L   *CBC with platelets differential   Result Value Ref Range    WBC 9.1 4.0 - 11.0 10e9/L    RBC Count 4.34 (L) 4.4 - 5.9 10e12/L    Hemoglobin 12.9 (L) 13.3 - 17.7 g/dL    Hematocrit 37.9 (L) 40.0 - 53.0 %    MCV 87 78 - 100 fl    MCH 29.7 26.5 - 33.0 pg    MCHC 34.0 31.5 - 36.5 g/dL    RDW 15.9 (H) 10.0 - 15.0 %    Platelet Count 442 150 - 450 10e9/L    Diff Method Automated Method     % Neutrophils 66.5 %    % Lymphocytes 18.9 %    % Monocytes 13.1 %    % Eosinophils 0.9 %    % Basophils 0.4 %    % Immature Granulocytes 0.2 %    Nucleated RBCs 0 0 /100    Absolute Neutrophil 6.0 1.6 - 8.3 10e9/L    Absolute Lymphocytes 1.7 0.8 - 5.3 10e9/L    Absolute Monocytes 1.2 0.0 - 1.3 10e9/L    Absolute Eosinophils 0.1 0.0 - 0.7 10e9/L    Absolute Basophils 0.0 0.0 - 0.2 10e9/L    Abs Immature Granulocytes 0.0 0 - 0.4 10e9/L    Absolute Nucleated RBC 0.0    Phosphorus   Result Value Ref Range    Phosphorus 3.9 2.5 - 4.5 mg/dL   Magnesium   Result Value Ref Range    Magnesium 1.7 1.6 - 2.3 mg/dL         Assessment and Plan:    Abdominal pain, nausea: He is still having some ostomy output, although decreased and nausea, but no vomiting. However, unable to eat or drink due to increased nausea and pain. Could be ileus secondary to peritoneal carcinomatosis, but concern for obstruction proximal to end ileostomy. Pain could also be secondary to mass seen above on CT, but unclear why he has worsening symptoms with po intake. Pain overall is improved, but he is also on q4h pain medication. BMP with mildly elevated Cr, but K, Mag, Phos WNL. He was able to eat some crackers after 2L IVF and Zofran.   --Will send Rx for Zofran 8mg ODT q8h prn. Will also change dilaudid to oxycodone 5-10 mg po q4h prn to see if he has better  pain control. His insurance limits him to 8 tablets/day  --Will have him return for IVF and anti-emetics on Wednesday AM. He will attempt to eat/drink in small amounts and see if pain and nausea under improved control. Cautioned him to eat small amounts and slowly, and to stop if symptoms are refractory to measures above. He will record ostomy output daily.    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 S/p ex lap 7/2017 with small bowel resection and end ileostomy. S/p 6 cycles of FOLFOX. Admitted on 12/6/2017 for ex-lap with aborted HIPEC due to diffuse carcinomatosis.   --Next generation sequencing--no mutations were identified in analyzed regions of KRAS, NRAS, BRAF, HRAS or PIK3CA. Therefore, this patient may be eligible for anti-EGFR antibody therapy if clinically indicated   --MSI testing with no microsatellite instability.  --He will return to see Dr. Long on 2/1. As he already has had CT A/P, will d/c CT CAP order and change to chest CT only.      Jacque Dale PA-C  [facility]  [address] [place] [address] [phone]

## 2018-01-31 NOTE — TELEPHONE ENCOUNTER
Pt called in to triage reporting still unable to eat and drink much due to abdominal pain and nausea. Stated he has not had any solid foods for the past 4 days. He was seen on Monday and received 2L IVF, he stated afterwards he had some crackers but by the time he got home, his abdominal pain was back. He has been taking Oxy IR 10 mg every 4 hours, last taken at 4:30 am today. He did not start the ondansetron as instructed. His ostomy output for the last 24 hours was 615 ml. Abdomen feels distended. He is scheduled for labs and IVF today, asking to see provider.    Spoke with Jacque DE LUNA: Add Mg, Phos, lactic acid to labs, she will see him in infusion, schedule for 9:40 am. Pt informed, message sent to schedulers.

## 2018-01-31 NOTE — MR AVS SNAPSHOT
After Visit Summary   1/31/2018    Sebas Lopez    MRN: [id number]           Patient Information     Date Of Birth          1965        Visit Information        Provider Department      1/31/2018 9:40 AM Jacque Dale PA [facility]        Today's Diagnoses     Peritoneal carcinomatosis (H)        Cancer of sigmoid colon (H)        Hypokalemia        Diarrhea, unspecified type           Follow-ups after your visit        Your next 10 appointments already scheduled     Feb 01, 2018 11:40 AM CST   (Arrive by 11:25 AM)   CT CHEST W CONTRAST with UCCT2   [facility] [place] [facility] ([facility])    [address]  1st Floor  [facility] [address] [phone]           Please bring any scans or X-rays taken at other hospitals, if similar tests were done. Also bring a list of your medicines, including vitamins, minerals and over-the-counter drugs. It is safest to leave personal items at home.  Be sure to tell your doctor:   If you have any allergies.   If there s any chance you are pregnant.   If you are breastfeeding.   If you have any special needs.  You will have contrast for this exam. To prepare:   Do not eat or drink for 2 hours before your exam. If you need to take medicine, you may take it with small sips of water. (We may ask you to take liquid medicine as well.)   The day before your exam, drink extra fluids at least six 8-ounce glasses (unless your doctor tells you to restrict your fluids).  Patients over 70 or patients with diabetes or kidney problems:   If you haven t had a blood test (creatinine test) within the last 30 days, go to your clinic or Diagnostic Imaging Department for this test.  If you have diabetes:   If your kidney function is normal, continue taking your metformin (Avandamet, Glucophage, Glucovance, Metaglip) on the day of your exam.   If your kidney function is abnormal, wait 48 hours before restarting this  medicine.  Please wear loose clothing, such as a sweat suit or jogging clothes. Avoid snaps, zippers and other metal. We may ask you to undress and put on a hospital gown.  If you have any questions, please call the Imaging Department where you will have your exam.            Feb 01, 2018  2:30 PM CST   (Arrive by 2:15 PM)   Return Visit with Albert Long MD   [facility] ([facility] [place])    [address] [place] [address]  [address] [place] [address] [phone]              Future tests that were ordered for you today     Open Future Orders        Priority Expected Expires Ordered    *CBC with platelets differential Routine 2/1/2018 1/31/2019 1/31/2018    Comprehensive metabolic panel Routine 2/1/2018 1/31/2019 1/31/2018    Magnesium Routine 2/1/2018 1/31/2019 1/31/2018    Phosphorus Routine 2/1/2018 1/31/2019 1/31/2018    IR Consultation for IR Exam Routine  1/31/2019 1/31/2018    IR Gastrostomy Tube Percutaneous Plcmnt Routine  1/31/2019 1/31/2018            Who to contact     If you have questions or need follow up information about today's clinic visit or your schedule please contact [facility] directly at [phone].  Normal or non-critical lab and imaging results will be communicated to you by ShareYourCarthart, letter or phone within 4 business days after the clinic has received the results. If you do not hear from us within 7 days, please contact the clinic through MyChart or phone. If you have a critical or abnormal lab result, we will notify you by phone as soon as possible.  Submit refill requests through Epic[employer] or call your pharmacy and they will forward the refill request to us. Please allow 3 business days for your refill to be completed.          Additional Information About Your Visit        ShareYourCartharMAZ Information     [employer] gives you secure access to your electronic health record. If you see a primary care provider, you can also send messages to  your care team and make appointments. If you have questions, please call your primary care clinic.  If you do not have a primary care provider, please call [phone] and they will assist you.        Care EveryWhere ID     This is your Care EveryWhere ID. This could be used by other organizations to access your [facility] medical records  [id number]        Your Vitals Were     Pulse Temperature Respirations Pulse Oximetry BMI (Body Mass Index)       84 97.7  F (36.5  C) (Oral) 16 99% 21.24 kg/m2        Blood Pressure from Last 3 Encounters:   01/31/18 125/86   01/31/18 127/89   01/29/18 127/85    Weight from Last 3 Encounters:   01/31/18 65.3 kg (143 lb 14.4 oz)   01/29/18 64.5 kg (142 lb 3.2 oz)   01/26/18 66.8 kg (147 lb 4.3 oz)              We Performed the Following     *CBC with platelets differential     Comprehensive metabolic panel     Lactic acid     Magnesium     Phosphorus          Today's Medication Changes          These changes are accurate as of 1/31/18  8:51 PM.  If you have any questions, ask your nurse or doctor.               These medicines have changed or have updated prescriptions.        Dose/Directions    * oxyCODONE 5 MG capsule   Commonly known as:  OXY-IR   This may have changed:  Another medication with the same name was added. Make sure you understand how and when to take each.   Used for:  Peritoneal carcinomatosis (H), Cancer of sigmoid colon (H)   Changed by:  Jacque Dale PA        Dose:  5-10 mg   Take 1-2 capsules (5-10 mg) by mouth every 4 hours as needed for moderate to severe pain   Quantity:  60 capsule   Refills:  0       * oxyCODONE 10 MG 12 hr tablet   Commonly known as:  OXYCONTIN   This may have changed:  You were already taking a medication with the same name, and this prescription was added. Make sure you understand how and when to take each.   Used for:  Peritoneal carcinomatosis (H), Cancer of sigmoid colon (H)   Changed by:  Jacque Dale PA        Dose:   10 mg   Take 1 tablet (10 mg) by mouth every 12 hours   Quantity:  60 tablet   Refills:  0       * Notice:  This list has 2 medication(s) that are the same as other medications prescribed for you. Read the directions carefully, and ask your doctor or other care provider to review them with you.         Where to get your medicines      Some of these will need a paper prescription and others can be bought over the counter.  Ask your nurse if you have questions.     Bring a paper prescription for each of these medications     oxyCODONE 10 MG 12 hr tablet                Primary Care Provider Office Phone # Fax #    Jaguar Becker [phone]       [facility] [address]  [facility] [address]        Equal Access to Services     SARAH DUNN : Hadii karrie Funk, waiain abernathy, cain tsangmada lewis, bhargav sage . So [facility] [phone].    ATENCIÓN: Si habla español, tiene a cid disposición servicios gratuitos de asistencia lingüística. Llame al [phone].    We comply with applicable federal civil rights laws and [place] laws. We do not discriminate on the basis of race, color, national origin, age, disability, sex, sexual orientation, or gender identity.            Thank you!     Thank you for choosing [facility]  for your care. Our goal is always to provide you with excellent care. Hearing back from our patients is one way we can continue to improve our services. Please take a few minutes to complete the written survey that you may receive in the mail after your visit with us. Thank you!             Your Updated Medication List - Protect others around you: Learn how to safely use, store and throw away your medicines at www.disposemymeds.org.          This list is accurate as of 1/31/18  8:51 PM.  Always use your most recent med list.                   Brand Name Dispense Instructions for use Diagnosis     amylase-lipase-protease 83301 UNITS Cpep    CREON    240 capsule    Take 2 capsules (72,000 Units) by mouth 3 times daily (with meals) And 1 capsule with each snack tid.    Diarrhea, unspecified type, Peritoneal carcinomatosis (H), Cancer of sigmoid colon (H)       diphenoxylate-atropine 2.5-0.025 MG per tablet    LOMOTIL    240 tablet    Take 1-2 tablets by mouth 4 times daily as needed for diarrhea    Diarrhea due to drug       IRON SUPPLEMENT PO      Take 325 mg by mouth 2 times daily (with meals)        loperamide 2 MG capsule    IMODIUM    240 capsule    Take 2 capsules (4 mg) by mouth 4 times daily as needed for diarrhea    Diarrhea, unspecified type       LORazepam 0.5 MG tablet    ATIVAN    30 tablet    Take 1 tablet (0.5 mg) by mouth every 4 hours as needed (Anxiety, Nausea/Vomiting or Sleep)    Peritoneal carcinomatosis (H), Cancer of sigmoid colon (H)       magnesium oxide 400 MG tablet    MAG-OX    60 tablet    Take 1 tablet (400 mg) by mouth 2 times daily    Hypomagnesemia       octreotide 50 MCG/ML Soln injection    sandoSTATIN    90 mL    Inject 1 mL (50 mcg) Subcutaneous 3 times daily    Diarrhea, unspecified type       omeprazole 20 MG CR capsule    priLOSEC    30 capsule    Take 1 capsule (20 mg) by mouth daily    Diarrhea, unspecified type       ondansetron 8 MG ODT tab    ZOFRAN-ODT    30 tablet    Take 1 tablet (8 mg) by mouth every 8 hours as needed for nausea    Nausea       opium tincture tincture     72 mL    Take 0.6 mLs (6 mg) by mouth 4 times daily    Diarrhea, unspecified type, Cancer of sigmoid colon (H), Peritoneal carcinomatosis (H)       * oxyCODONE 5 MG capsule    OXY-IR    60 capsule    Take 1-2 capsules (5-10 mg) by mouth every 4 hours as needed for moderate to severe pain    Peritoneal carcinomatosis (H), Cancer of sigmoid colon (H)       * oxyCODONE 10 MG 12 hr tablet    OXYCONTIN    60 tablet    Take 1 tablet (10 mg) by mouth every 12 hours    Peritoneal carcinomatosis (H),  Cancer of sigmoid colon (H)       Potassium Chloride 40 MEQ/15ML (20%) Soln     450 mL    Take 15 mLs (40 mEq) by mouth daily    Hypokalemia       Psyllium 51.7 % Pack    METAMUCIL FIBER    90 each    Take 1 packet by mouth 3 times daily    Diarrhea, unspecified type       * Notice:  This list has 2 medication(s) that are the same as other medications prescribed for you. Read the directions carefully, and ask your doctor or other care provider to review them with you.

## 2018-01-31 NOTE — NURSING NOTE
"Chief Complaint   Patient presents with     Port Draw     port accessed and labs drawn by rn.  vs taken.     Port accessed with 20g 3/4\" gripper needle, labs drawn, port flushed with saline and heparin, vitals checked.  Elo Miles RN    "

## 2018-01-31 NOTE — LETTER
1/31/2018      RE: Sebas Lopez  [address] [place] [address]       Oncology/Hematology Visit Note  Jan 31, 2018    Reason for Visit: follow up of nausea, abdominal pain    History of Present Illness: Sebas Lopez is a 52 year old male with history of ulcerative colitis who presented with abdominal pain in May 2017. Imaging performed at that time showing sigmoid wall thickening with adjacent mesenteric lymphadenopathy with free fluid near the abdominal wall mess from prior hernia surgery. He underwent a colonoscopy that showed an obstructing sigmoid colon mass. Biopsy of the mass showing sigmoid adenocarcinoma. He then developed obstructive symptoms and underwent an exploratory laparotomy on 7/10/2017. During that procedure, he was found to have diffuse peritoneal carcinomatosis. Extensive lysis of adhesions was performed and a small bowel resection was performed with end ileostomy. The biopsies from multiple large peritoneal metastases were also positive for metastatic adenocarcinoma. He started FOLFOX (5-FU, oxaliplatin, leucovorin) on 8/11/17. He completed 6 cycles. He then underwent diagnostic laparoscopy and ex-lap on 12/6/17 with Dr. Dudley. HIPEC aborted due to diffuse carcinomatosis. He then presented to ED on 1/26 with abdominal pain and associated nausea. CT A/P on 1/26 with 5.2 x 4.5 x 3.6 cm proximal sigmoid mass causnig colonic obstriction with singificant distention of cecum (7cm in diameter), ascending colon and proximal transverse colon. No pneumatosis or pneumoperitoneum. Also small volume of ascites with associated findings concerning for peritoneal carcinomatosis, increased from prior studies. Dr. Dudley was consulted and recommended no surgical intervention, but possible cecotomy per IR if possible. Sebas was discharged home with trial of dilaudid prn. Octreotide held. He received IVF support, Zofran on 1/29.    Interval History:  Sebas returns today. He states that he  has unable to drink more than small sips of water throughout the day since Monday. He tried to drink some Ensure, but that caused more abdominal cramping and nausea. He is taking 10 mg of oxycodone every 4 hours for pain, and this has improved his pain control, but the pain relief wears off after about 3.5 hours. He has not been able to eat any solid food. He denies any vomiting. He did not take the Zofran as he did not understand that it was to prevent nausea. He does not think his abdominal distention is any worse. His ostomy put out 615mL in past 24 hours. He is voiding without difficulty. Denies any dizziness.     His 10-point review of systems is otherwise negative.       Current Outpatient Prescriptions   Medication Sig Dispense Refill     oxyCODONE (OXYCONTIN) 10 MG 12 hr tablet Take 1 tablet (10 mg) by mouth every 12 hours 60 tablet 0     ondansetron (ZOFRAN-ODT) 8 MG ODT tab Take 1 tablet (8 mg) by mouth every 8 hours as needed for nausea (Patient not taking: Reported on 1/31/2018) 30 tablet 1     oxyCODONE (OXY-IR) 5 MG capsule Take 1-2 capsules (5-10 mg) by mouth every 4 hours as needed for moderate to severe pain 60 capsule 0     omeprazole (PRILOSEC) 20 MG CR capsule Take 1 capsule (20 mg) by mouth daily (Patient not taking: Reported on 1/31/2018) 30 capsule 11     loperamide (IMODIUM) 2 MG capsule Take 2 capsules (4 mg) by mouth 4 times daily as needed for diarrhea (Patient not taking: Reported on 1/31/2018) 240 capsule 11     opium tincture tincture Take 0.6 mLs (6 mg) by mouth 4 times daily (Patient not taking: Reported on 1/31/2018) 72 mL 0     octreotide (SANDOSTATIN) 50 MCG/ML SOLN injection Inject 1 mL (50 mcg) Subcutaneous 3 times daily (Patient not taking: Reported on 1/29/2018) 90 mL 2     diphenoxylate-atropine (LOMOTIL) 2.5-0.025 MG per tablet Take 1-2 tablets by mouth 4 times daily as needed for diarrhea (Patient not taking: Reported on 1/31/2018) 240 tablet 5     amylase-lipase-protease  (CREON) 72145 UNITS CPEP Take 2 capsules (72,000 Units) by mouth 3 times daily (with meals) And 1 capsule with each snack tid. (Patient not taking: Reported on 1/31/2018) 240 capsule 3     LORazepam (ATIVAN) 0.5 MG tablet Take 1 tablet (0.5 mg) by mouth every 4 hours as needed (Anxiety, Nausea/Vomiting or Sleep) (Patient not taking: Reported on 1/31/2018) 30 tablet 5     magnesium oxide (MAG-OX) 400 MG tablet Take 1 tablet (400 mg) by mouth 2 times daily (Patient not taking: Reported on 1/31/2018) 60 tablet 1     Potassium Chloride 40 MEQ/15ML (20%) SOLN Take 15 mLs (40 mEq) by mouth daily (Patient not taking: Reported on 1/31/2018) 450 mL 3     Psyllium (METAMUCIL FIBER) 51.7 % PACK Take 1 packet by mouth 3 times daily (Patient not taking: Reported on 1/31/2018) 90 each 3     Ferrous Sulfate (IRON SUPPLEMENT PO) Take 325 mg by mouth 2 times daily (with meals)          Physical Examination:  General: The patient is a pleasant male in no acute distress.  /86 (BP Location: Right arm, Patient Position: Sitting, Cuff Size: Adult Regular)  Pulse 84  Temp 97.7  F (36.5  C) (Oral)  Resp 16  Wt 65.3 kg (143 lb 14.4 oz)  SpO2 99%  BMI 21.24 kg/m2  Wt Readings from Last 10 Encounters:   01/31/18 65.3 kg (143 lb 14.4 oz)   01/29/18 64.5 kg (142 lb 3.2 oz)   01/26/18 66.8 kg (147 lb 4.3 oz)   01/25/18 67.1 kg (148 lb)   12/21/17 65.9 kg (145 lb 5 oz)   12/14/17 65.8 kg (145 lb 1.6 oz)   12/06/17 69.9 kg (154 lb 1.6 oz)   11/30/17 67.6 kg (149 lb)   11/29/17 67.9 kg (149 lb 9.6 oz)   11/20/17 68.6 kg (151 lb 4.8 oz)     HEENT: EOMI, PERRL. Sclerae are anicteric. Oral mucosa is pink and moist with no lesions or thrush.   Heart: Regular rate and rhythm.   Lungs: Clear to auscultation bilaterally.   Abdomen: Bowel sounds present, soft, mildly tender on left side of adomen. Mild distention. Ostomy bag in place with brown liquid stool.   Extremities: No lower extremity edema noted bilaterally.   Neuro: Cranial nerves II  through XII are grossly intact.  Skin: No rashes, petechiae, or bruising noted on exposed skin.    Laboratory Data:  Results for orders placed or performed in visit on 01/31/18 (from the past 24 hour(s))   Comprehensive metabolic panel   Result Value Ref Range    Sodium 132 (L) 133 - 144 mmol/L    Potassium 3.7 3.4 - 5.3 mmol/L    Chloride 96 94 - 109 mmol/L    Carbon Dioxide 23 20 - 32 mmol/L    Anion Gap 13 3 - 14 mmol/L    Glucose 98 70 - 99 mg/dL    Urea Nitrogen 17 7 - 30 mg/dL    Creatinine 0.96 0.66 - 1.25 mg/dL    GFR Estimate 81 >60 mL/min/1.7m2    GFR Estimate If Black >90 >60 mL/min/1.7m2    Calcium 9.8 8.5 - 10.1 mg/dL    Bilirubin Total 0.6 0.2 - 1.3 mg/dL    Albumin 3.4 3.4 - 5.0 g/dL    Protein Total 9.0 (H) 6.8 - 8.8 g/dL    Alkaline Phosphatase 134 40 - 150 U/L    ALT 14 0 - 70 U/L    AST 15 0 - 45 U/L   *CBC with platelets differential   Result Value Ref Range    WBC 10.2 4.0 - 11.0 10e9/L    RBC Count 4.37 (L) 4.4 - 5.9 10e12/L    Hemoglobin 12.7 (L) 13.3 - 17.7 g/dL    Hematocrit 38.5 (L) 40.0 - 53.0 %    MCV 88 78 - 100 fl    MCH 29.1 26.5 - 33.0 pg    MCHC 33.0 31.5 - 36.5 g/dL    RDW 15.8 (H) 10.0 - 15.0 %    Platelet Count 485 (H) 150 - 450 10e9/L    Diff Method Automated Method     % Neutrophils 76.5 %    % Lymphocytes 9.1 %    % Monocytes 12.8 %    % Eosinophils 0.3 %    % Basophils 0.5 %    % Immature Granulocytes 0.8 %    Nucleated RBCs 0 0 /100    Absolute Neutrophil 7.8 1.6 - 8.3 10e9/L    Absolute Lymphocytes 0.9 0.8 - 5.3 10e9/L    Absolute Monocytes 1.3 0.0 - 1.3 10e9/L    Absolute Eosinophils 0.0 0.0 - 0.7 10e9/L    Absolute Basophils 0.1 0.0 - 0.2 10e9/L    Abs Immature Granulocytes 0.1 0 - 0.4 10e9/L    Absolute Nucleated RBC 0.0    Magnesium   Result Value Ref Range    Magnesium 1.6 1.6 - 2.3 mg/dL   Phosphorus   Result Value Ref Range    Phosphorus 2.6 2.5 - 4.5 mg/dL   Lactic acid   Result Value Ref Range    Lactic Acid 1.5 0.4 - 2.0 mmol/L         Assessment and  Plan:    Abdominal pain, nausea: Pain control is improved with oxycodone 10mg q4h but continues to be unable to eat or drink more than small sips due to increased nausea and pain. Spoke with radiology about his CT A/P on 1/26. The proximal sigmoid mass is about the same size as on previous CT. There does not appear to be any signs of obstruction proximal to the ileostomy. Spoke with Dr. Dudley from surgical oncology. He thinks Sebas's symptoms are more likely from ileus secondary to the peritoneal carcinomatosis. He states the sigmoid mass and distention of cecum might be compressing the small bowel, but less likely to be contributing much to his symptoms. He recommends a venting g-tube and TPN.   --Sebas given Emend today, along with some IV dilaudid and 1L NS IVF. His electrolytes are still WNL, although phos trending down. His Cr is improved today. Spoke with Sebas regarding admission to attempt to provide better pain and nausea control with the goal of being able to tolerate po intake versus starting him on oxycontin 10mg q12h in addition to oxycodone prn and have him try to take Zofran and Compazine at home 30 minutes prior to eating. Recommend he try Boost or juice based supplements as he was able to tolerate some juice today in infusion after receiving Emend and dilaudid. Sebas decided to try at home again with these measures. He will return tomorrow to see Dr. Long. In the meantime I have placed an IR consult and order for G-tube placement. Will need to call [facility] home infusion to discuss initiation of TPN if he continues to be unable to tolerate po intake.    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 S/p ex lap 7/2017 with small bowel resection and end ileostomy. S/p 6 cycles of FOLFOX. Admitted on 12/6/2017 for ex-lap with aborted HIPEC due to diffuse carcinomatosis.   --Next generation sequencing--no mutations were identified in analyzed regions of KRAS, NRAS, BRAF, HRAS or PIK3CA.  Therefore, this patient may be eligible for anti-EGFR antibody therapy if clinically indicated   --MSI testing with no microsatellite instability.  --He will return to see Dr. Long on 2/1. As he already has had CT A/P, will d/c CT CAP order and change to chest CT only.      Jacque Dale PA-C  [facility] [address] [id number] [phone]

## 2018-01-31 NOTE — PROGRESS NOTES
Infusion Nursing Note:  Sebas Jewel John presents today for infusion of IVF and prn pain medications HX: Colon CA.    Patient seen by provider today: Yes: Jacque DE LUNA   present during visit today: Not Applicable.    Note: PT arrived, ambulatory, in care of parents.  PT with reports of not being able to eat or drink well as outpatient.  Pt with ostomy and intermittent bowel cramping.  Pt reports taking Oxycodone q4hrs at home.  Provider assessment completed at this visit.  Will administer IVF x2 liters and Emend for continued nausea.  Will monitor tolerance.  Pt received Dilaudid 0.5mg IVP x1.  PT rating pain at 5/10 prior to administration of Dilaudid.  Pt reports less pain, but still cramping, after Dilaudid doses.  Provider returned to discuss pain management plan.  Pt to be discharged with RX for Oxycontin.    Intravenous Access:  Implanted Port. Port flushed with saline and heparin upon completion of use.  Port needle removed.  Site WNL.    Treatment Conditions:  Results reviewed, labs MET treatment parameters, ok to proceed with treatment.      Post Infusion Assessment:  Patient tolerated infusions without incident.  Pt rating abdominal pain at 3/10.  Currently alert and oriented to plan of care.    Discharge Plan:   Pt ambulatory with steady gait upon discharge to home.  Parents as  to home.  PT to  new RX of Oxycontin today.    Roxanne Andrews RN

## 2018-01-31 NOTE — NURSING NOTE
"Oncology Rooming Note    January 31, 2018 12:53 PM   Sebas Lopez is a 52 year old male who presents for:    Chief Complaint   Patient presents with     Infusion     Initial Vitals: There were no vitals taken for this visit. Estimated body mass index is 21.24 kg/(m^2) as calculated from the following:    Height as of 1/29/18: 1.753 m (5' 9.02\").    Weight as of an earlier encounter on 1/31/18: 65.3 kg (143 lb 14.4 oz). There is no height or weight on file to calculate BSA.  Data Unavailable Comment: Data Unavailable   No LMP for male patient.  Allergies reviewed: Yes  Medications reviewed: Yes    Medications: Medication refills not needed today.  Pharmacy name entered into [employer]: [facility] - [address]    Clinical concerns: Pt presents for infusion.  PT reports minimal po intake at home.  Pt with continued abdominal pain, has right sided ostomy.  Provider assessment completed.  Plan for infusion of NS x2 liters, IV Emend, and prn Dilaudid for current discomfort.  PT able to drink juice only this visit.  Accompanied by parents.  Pt alert and oriented to plan of care.     0 minutes for nursing intake (face to face time)     Roxanne Andrews RN                "

## 2018-01-31 NOTE — MR AVS SNAPSHOT
After Visit Summary   1/31/2018    Sebas Lopez    MRN: [id number]           Patient Information     Date Of Birth          1965        Visit Information        Provider Department      1/31/2018 9:00 AM UC 7 ATC; UC BMT INFUSION [facility] Mar[facility] Trans[facility]        Today's Diagnoses     Diarrhea, unspecified type    -  1    Hypokalemia        Cancer of sigmoid colon (H)              [facility] ([facility])  [address] [place] [address]  Phone: [phone]  Clinic Hours:   Monday-Thursday:7am to 7pm   Friday: 7am to 5pm   Weekends and holidays:    8am to noon (in general)  If your fever is 100.5  or greater,   call the clinic.  After hours call the   hospital at [phone] or   [phone]. Ask for the BMT   fellow on-call            Follow-ups after your visit        Your next 10 appointments already scheduled     Feb 01, 2018 11:40 AM CST   (Arrive by 11:25 AM)   CT CHEST W CONTRAST with UCCT2   [facility] CT ([facility])    [address] [place] [address] [phone]           Please bring any scans or X-rays taken at other hospitals, if similar tests were done. Also bring a list of your medicines, including vitamins, minerals and over-the-counter drugs. It is safest to leave personal items at home.  Be sure to tell your doctor:   If you have any allergies.   If there s any chance you are pregnant.   If you are breastfeeding.   If you have any special needs.  You will have contrast for this exam. To prepare:   Do not eat or drink for 2 hours before your exam. If you need to take medicine, you may take it with small sips of water. (We may ask you to take liquid medicine as well.)   The day before your exam, drink extra fluids at least six 8-ounce glasses (unless your doctor tells you to restrict your fluids).  Patients over 70 or patients with diabetes or kidney problems:   If you  haven t had a blood test (creatinine test) within the last 30 days, go to your clinic or Diagnostic Imaging Department for this test.  If you have diabetes:   If your kidney function is normal, continue taking your metformin (Avandamet, Glucophage, Glucovance, Metaglip) on the day of your exam.   If your kidney function is abnormal, wait 48 hours before restarting this medicine.  Please wear loose clothing, such as a sweat suit or jogging clothes. Avoid snaps, zippers and other metal. We may ask you to undress and put on a hospital gown.  If you have any questions, please call the Imaging Department where you will have your exam.            Feb 01, 2018  2:30 PM CST   (Arrive by 2:15 PM)   Return Visit with Albert Long MD   [facility] ([facility])    [address]  [address]  [facility] [address] [phone]              Future tests that were ordered for you today     Open Future Orders        Priority Expected Expires Ordered    IR Consultation for IR Exam Routine  1/31/2019 1/31/2018    IR Gastrostomy Tube Percutaneous Plcmnt Routine  1/31/2019 1/31/2018            Who to contact     If you have questions or need follow up information about today's clinic visit or your schedule please contact [facility] directly at [phone].  Normal or non-critical lab and imaging results will be communicated to you by MyChart, letter or phone within 4 business days after the clinic has received the results. If you do not hear from us within 7 days, please contact the clinic through MyChart or phone. If you have a critical or abnormal lab result, we will notify you by phone as soon as possible.  Submit refill requests through [employer] or call your pharmacy and they will forward the refill request to us. Please allow 3 business days for your refill to be completed.          Additional Information About Your Visit        [employer] Information      [email or web address] gives you secure access to your electronic health record. If you see a primary care provider, you can also send messages to your care team and make appointments. If you have questions, please call your primary care clinic.  If you do not have a primary care provider, please call [phone] and they will assist you.        Care EveryWhere ID     This is your Care EveryWhere ID. This could be used by other organizations to access your [facility] medical records  [id number]        Your Vitals Were     Pulse Respirations Pulse Oximetry             87 14 99%          Blood Pressure from Last 3 Encounters:   01/31/18 125/86   01/31/18 127/89   01/29/18 127/85    Weight from Last 3 Encounters:   01/31/18 65.3 kg (143 lb 14.4 oz)   01/29/18 64.5 kg (142 lb 3.2 oz)   01/26/18 66.8 kg (147 lb 4.3 oz)              Today, you had the following     No orders found for display         Today's Medication Changes          These changes are accurate as of 1/31/18  1:04 PM.  If you have any questions, ask your nurse or doctor.               These medicines have changed or have updated prescriptions.        Dose/Directions    * oxyCODONE 5 MG capsule   Commonly known as:  OXY-IR   This may have changed:  Another medication with the same name was added. Make sure you understand how and when to take each.   Used for:  Peritoneal carcinomatosis (H), Cancer of sigmoid colon (H)   Changed by:  Jacque Dale PA        Dose:  5-10 mg   Take 1-2 capsules (5-10 mg) by mouth every 4 hours as needed for moderate to severe pain   Quantity:  60 capsule   Refills:  0       * oxyCODONE 10 MG 12 hr tablet   Commonly known as:  OXYCONTIN   This may have changed:  You were already taking a medication with the same name, and this prescription was added. Make sure you understand how and when to take each.   Used for:  Peritoneal carcinomatosis (H), Cancer of sigmoid colon (H)   Changed by:  Jacque Dale PA        Dose:  10 mg    Take 1 tablet (10 mg) by mouth every 12 hours   Quantity:  60 tablet   Refills:  0       * Notice:  This list has 2 medication(s) that are the same as other medications prescribed for you. Read the directions carefully, and ask your doctor or other care provider to review them with you.         Where to get your medicines      Some of these will need a paper prescription and others can be bought over the counter.  Ask your nurse if you have questions.     Bring a paper prescription for each of these medications     oxyCODONE 10 MG 12 hr tablet                Recent Review Flowsheet Data     BMT Recent Results Latest Ref Rng & Units 11/20/2017 12/6/2017 12/7/2017 12/14/2017 1/26/2018 1/29/2018 1/31/2018    WBC 4.0 - 11.0 10e9/L 11.8(H) - - 13.5(H) 10.9 9.1 10.2    Hemoglobin 13.3 - 17.7 g/dL 12.2(L) - - 10.6(L) 10.9(L) 12.9(L) 12.7(L)    Platelet Count 150 - 450 10e9/L 375 - - 540(H) 494(H) 442 485(H)    Neutrophils (Absolute) 1.6 - 8.3 10e9/L - - - 9.0(H) 8.8(H) 6.0 7.8    INR 0.86 - 1.14 - - - - 1.25(H) - -    Sodium 133 - 144 mmol/L 139 - - 133 139 128(L) 132(L)    Potassium 3.4 - 5.3 mmol/L 5.2 - - 3.9 3.7 3.7 3.7    Chloride 94 - 109 mmol/L 104 - - 99 106 94 96    Glucose 70 - 99 mg/dL 100(H) 96 111(H) 102(H) 105(H) 94 98    Urea Nitrogen 7 - 30 mg/dL 18 - - 14 12 21 17    Creatinine 0.66 - 1.25 mg/dL 1.15 - - 0.95 0.84 1.08 0.96    Calcium (Total) 8.5 - 10.1 mg/dL 9.5 - - 8.3(L) 9.3 9.7 9.8    Protein (Total) 6.8 - 8.8 g/dL 9.1(H) - - 7.8 8.0 - 9.0(H)    Albumin 3.4 - 5.0 g/dL 3.5 - - 2.8(L) 3.1(L) - 3.4    Alkaline Phosphatase 40 - 150 U/L 168(H) - - 172(H) 167(H) - 134    AST 0 - 45 U/L 24 - - 28 12 - 15    ALT 0 - 70 U/L 25 - - 24 17 - 14    MCV 78 - 100 fl 91 - - 88 91 87 88               Primary Care Provider Office Phone # Fax #    Jaguar Becker [phone]       [facility] [address] [facility] [address]        Equal Access to Services     SARAH DUNN AH: Cristina lazar  Orianaali, wahayderda luqadaha, qaybta kafadia saucedo, bhargav kearneybrice alba. So [facility] [phone].    ATENCIÓN: Si maikel rios, tiene a cid disposición servicios gratuitos de asistencia lingüística. Kenan al [phone].    We comply with applicable federal civil rights laws and [place] laws. We do not discriminate on the basis of race, color, national origin, age, disability, sex, sexual orientation, or gender identity.            Thank you!     Thank you for choosing [facility]  for your care. Our goal is always to provide you with excellent care. Hearing back from our patients is one way we can continue to improve our services. Please take a few minutes to complete the written survey that you may receive in the mail after your visit with us. Thank you!             Your Updated Medication List - Protect others around you: Learn how to safely use, store and throw away your medicines at www.disposemymeds.org.          This list is accurate as of 1/31/18  1:04 PM.  Always use your most recent med list.                   Brand Name Dispense Instructions for use Diagnosis    amylase-lipase-protease 77615 UNITS Cpep    CREON    240 capsule    Take 2 capsules (72,000 Units) by mouth 3 times daily (with meals) And 1 capsule with each snack tid.    Diarrhea, unspecified type, Peritoneal carcinomatosis (H), Cancer of sigmoid colon (H)       diphenoxylate-atropine 2.5-0.025 MG per tablet    LOMOTIL    240 tablet    Take 1-2 tablets by mouth 4 times daily as needed for diarrhea    Diarrhea due to drug       IRON SUPPLEMENT PO      Take 325 mg by mouth 2 times daily (with meals)        loperamide 2 MG capsule    IMODIUM    240 capsule    Take 2 capsules (4 mg) by mouth 4 times daily as needed for diarrhea    Diarrhea, unspecified type       LORazepam 0.5 MG tablet    ATIVAN    30 tablet    Take 1 tablet (0.5 mg) by mouth every 4 hours as needed (Anxiety, Nausea/Vomiting or  Sleep)    Peritoneal carcinomatosis (H), Cancer of sigmoid colon (H)       magnesium oxide 400 MG tablet    MAG-OX    60 tablet    Take 1 tablet (400 mg) by mouth 2 times daily    Hypomagnesemia       octreotide 50 MCG/ML Soln injection    sandoSTATIN    90 mL    Inject 1 mL (50 mcg) Subcutaneous 3 times daily    Diarrhea, unspecified type       omeprazole 20 MG CR capsule    priLOSEC    30 capsule    Take 1 capsule (20 mg) by mouth daily    Diarrhea, unspecified type       ondansetron 8 MG ODT tab    ZOFRAN-ODT    30 tablet    Take 1 tablet (8 mg) by mouth every 8 hours as needed for nausea    Nausea       opium tincture tincture     72 mL    Take 0.6 mLs (6 mg) by mouth 4 times daily    Diarrhea, unspecified type, Cancer of sigmoid colon (H), Peritoneal carcinomatosis (H)       * oxyCODONE 5 MG capsule    OXY-IR    60 capsule    Take 1-2 capsules (5-10 mg) by mouth every 4 hours as needed for moderate to severe pain    Peritoneal carcinomatosis (H), Cancer of sigmoid colon (H)       * oxyCODONE 10 MG 12 hr tablet    OXYCONTIN    60 tablet    Take 1 tablet (10 mg) by mouth every 12 hours    Peritoneal carcinomatosis (H), Cancer of sigmoid colon (H)       Potassium Chloride 40 MEQ/15ML (20%) Soln     450 mL    Take 15 mLs (40 mEq) by mouth daily    Hypokalemia       Psyllium 51.7 % Pack    METAMUCIL FIBER    90 each    Take 1 packet by mouth 3 times daily    Diarrhea, unspecified type       * Notice:  This list has 2 medication(s) that are the same as other medications prescribed for you. Read the directions carefully, and ask your doctor or other care provider to review them with you.

## 2018-02-01 NOTE — DISCHARGE INSTRUCTIONS

## 2018-02-01 NOTE — NURSING NOTE
"Oncology Rooming Note    February 1, 2018 12:38 PM   Sebas Lopez is a 52 year old male who presents for:    Chief Complaint   Patient presents with     Oncology Clinic Visit     Return: Colon CA     Initial Vitals: /82 (BP Location: Left arm, Patient Position: Chair, Cuff Size: Adult Regular)  Pulse 75  Temp 97.7  F (36.5  C) (Oral)  Resp 16  Ht 1.753 m (5' 9.02\")  Wt 64.9 kg (143 lb)  SpO2 97%  BMI 21.11 kg/m2 Estimated body mass index is 21.11 kg/(m^2) as calculated from the following:    Height as of this encounter: 1.753 m (5' 9.02\").    Weight as of this encounter: 64.9 kg (143 lb). Body surface area is 1.78 meters squared.  Moderate Pain (5) Comment: Data Unavailable   No LMP for male patient.  Allergies reviewed: Yes  Medications reviewed: Yes    Medications: Medication refills not needed today.  Pharmacy name entered into BB[facility]: [facility] - [address]    Clinical concerns: pain level of 5 in abdomen with pain medicaitons.  I informed pt to remind the Provider/ELIF about  pain level in case i dont touch bases with them, if the provider was in the exam room while i attend on rooming the next pt. Pt verbalized understandings.  Carlie De La Paz CMA      6 minutes for nursing intake (face to face time)     Carlie De La Paz CMA                "

## 2018-02-01 NOTE — PROGRESS NOTES
Oncology follow up visit:  Date on this visit: 2/1/2018         CC  sigmoid adenocarcinoma with peritoneal carcinomatosis    Primary Physician: Waylon Han     History Of Present Illness:     Please see previous note for details. I have copied and updated from prior notes.   Sebas is a 52-year-old male who has a history of ulcerative colitis diagnosed more than 20 years ago, but he has not had medical management for that.  He was doing well, but in 05/2017 he presented with a few weeks of abdominal pain.  At that time, his imaging showed that he had a sigmoid wall thickening with adjacent mesenteric lymphadenopathy and free fluid near the abdominal wall mesh from his prior hernia surgery.  He subsequently underwent a colonoscopy which was incomplete and showed an obstructing sigmoid colon mass.  The biopsy from the sigmoid mass showed sigmoid adenocarcinoma.  He then developed obstructive symptoms and underwent exploratory laparotomy on 07/10/2017.  During that he was found to have diffuse peritoneal carcinomatosis.  Extensive lysis of adhesions was performed and a small bowel resection was performed with end ileostomy.  The biopsies from the multiple large peritoneal metastasis also were positive for metastatic adenocarcinoma. We still haven't received testing on MSI or NGS panel back     He started palliative FOLFOX on 8/11/17. C#6 given on 10/26/17  After 6 cycles, he has stable disease    On 12/6/17, he had Diagnostic laparoscopy and Exploratory laparotomy, but HIPEC was not performed as Peritoneal cancer index was 26 with diffuse involvement of the small bowel as well as the small bowel mesentery.     He then presented to ED on 1/26 with abdominal pain and associated nausea. CT A/P on 1/26 with 5.2 x 4.5 x 3.6 cm proximal sigmoid mass causnig colonic obstriction with singificant distention of cecum (7cm in diameter), ascending colon and proximal transverse colon. No pneumatosis or pneumoperitoneum. Also  small volume of ascites with associated findings concerning for peritoneal carcinomatosis, increased from prior studies. Dr. Dudley was consulted and recommended no surgical intervention.    He was then seen in clinic on 1/29/2018 as well as yesterday because for the last few days he was unable to tolerate solid foods and he was getting abdominal cramping and some nausea. He also noticed that his ostomy output decreased. He was given IV fluids as well as antibiotics and yesterday he was started on OxyContin 10 mg twice a day along with p.r.n. oxycodone. Of note a few weeks ago he was started on short acting octreotide to decrease the ostomy output but he stopped taking it about one week ago and few days prior to that he noticed worsening of the abdominal cramping.  He comes in today and tells me that he is feeling a little better. He continues to have abdominal cramping if he drinks fluids. He is not taking solid food and is basically taking water, and showed, boost, protein drinks but it makes him have abdominal cramps. Ostomy output is now better and today he had 2 bags of output. As mentioned above he is not taking octreotide for the last 1 week also. He had some nausea for which Zofran really helped. No vomiting. He denies any bleeding. No fevers. He thinks his abdominal pain is also better now that he has a started OxyContin 10 mg twice a day so he is taking 5 mg of oxycodone every 4 hours as opposed to 10 mg of oxycodone every 4 hours which he was taking prior to starting OxyContin  Denies any trouble breathing. He feels generalized weakness and has lost significant amount of weight. Currently he does not have any neuropathy although he felt neuropathy when he was on oxaliplatin.      ROS:  The rest of the review of system was negative      I reviewed her history in Epic as below      Past Medical/Surgical History:  Past Medical History:   Diagnosis Date     Adenocarcinoma of sigmoid colon (H)     stage IV  sigmoid adenocarcinoma with peritoneal metastasis.     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      HERNIA REPAIR       INSERT PORT VASCULAR ACCESS Right 8/10/2017    Procedure: INSERT PORT VASCULAR ACCESS;  Single Lumen Right Chest Power Port;  Surgeon: Malena Andrew PA-C;  Location: [facility]     LAPAROSCOPY DIAGNOSTIC (GENERAL) N/A 12/6/2017    Procedure: LAPAROSCOPY DIAGNOSTIC (GENERAL);  Diagnostic Laparoscopy, Exploratory Laparotomy Anesthesia Block ;  Surgeon: Rob Dudley MD;  Location: [facility]     LAPAROTOMY EXPLORATORY N/A 12/6/2017    Procedure: LAPAROTOMY EXPLORATORY;;  Surgeon: Rob Dudley MD;  Location: [facility]     ORTHOPEDIC SURGERY Left     HIP ARTHROPLASTY      Ulcerative colitis.  Left hip replacement.       Cancer History:   As above    Allergies:  Allergies as of 02/01/2018     (No Known Allergies)     Current Medications:  Current Outpatient Prescriptions   Medication Sig Dispense Refill     oxyCODONE (OXYCONTIN) 10 MG 12 hr tablet Take 1 tablet (10 mg) by mouth every 12 hours 60 tablet 0     ondansetron (ZOFRAN-ODT) 8 MG ODT tab Take 1 tablet (8 mg) by mouth every 8 hours as needed for nausea 30 tablet 1     oxyCODONE (OXY-IR) 5 MG capsule Take 1-2 capsules (5-10 mg) by mouth every 4 hours as needed for moderate to severe pain 60 capsule 0     omeprazole (PRILOSEC) 20 MG CR capsule Take 1 capsule (20 mg) by mouth daily (Patient not taking: Reported on 2/1/2018) 30 capsule 11     loperamide (IMODIUM) 2 MG  capsule Take 2 capsules (4 mg) by mouth 4 times daily as needed for diarrhea (Patient not taking: Reported on 2/1/2018) 240 capsule 11     opium tincture tincture Take 0.6 mLs (6 mg) by mouth 4 times daily (Patient not taking: Reported on 2/1/2018) 72 mL 0     octreotide (SANDOSTATIN) 50 MCG/ML SOLN injection Inject 1 mL (50 mcg) Subcutaneous 3 times daily (Patient not taking: Reported on 2/1/2018) 90 mL 2     diphenoxylate-atropine (LOMOTIL) 2.5-0.025 MG per tablet Take 1-2 tablets by mouth 4 times daily as needed for diarrhea (Patient not taking: Reported on 1/31/2018) 240 tablet 5     amylase-lipase-protease (CREON) 73135 UNITS CPEP Take 2 capsules (72,000 Units) by mouth 3 times daily (with meals) And 1 capsule with each snack tid. (Patient not taking: Reported on 1/31/2018) 240 capsule 3     LORazepam (ATIVAN) 0.5 MG tablet Take 1 tablet (0.5 mg) by mouth every 4 hours as needed (Anxiety, Nausea/Vomiting or Sleep) (Patient not taking: Reported on 2/1/2018) 30 tablet 5     magnesium oxide (MAG-OX) 400 MG tablet Take 1 tablet (400 mg) by mouth 2 times daily (Patient not taking: Reported on 2/1/2018) 60 tablet 1     Potassium Chloride 40 MEQ/15ML (20%) SOLN Take 15 mLs (40 mEq) by mouth daily (Patient not taking: Reported on 2/1/2018) 450 mL 3     Psyllium (METAMUCIL FIBER) 51.7 % PACK Take 1 packet by mouth 3 times daily (Patient not taking: Reported on 2/1/2018) 90 each 3     Ferrous Sulfate (IRON SUPPLEMENT PO) Take 325 mg by mouth 2 times daily (with meals)         Family History:  Family History   Problem Relation Age of Onset     Hypertension Father      DIABETES Father       No family history of cancer.  He does not have any kids.       Social History:  Social History     Social History     Marital status: Single     Spouse name: N/A     Number of children: N/A     Years of education: N/A     Occupational History     Not on file.     Social History Main Topics     Smoking status: Never Smoker     Smokeless  "tobacco: Never Used     Alcohol use 3.0 oz/week     5 Cans of beer per week      Comment: none for last 4 months     Drug use: No     Sexual activity: Not on file     Other Topics Concern     Not on file     Social History Narrative   He does not smoke and does not drink.  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  He lives with his girlfriend, Bessie.       Physical Exam:  /82 (BP Location: Left arm, Patient Position: Chair, Cuff Size: Adult Regular)  Pulse 75  Temp 97.7  F (36.5  C) (Oral)  Resp 16  Ht 1.753 m (5' 9.02\")  Wt 64.9 kg (143 lb)  SpO2 97%  BMI 21.11 kg/m2  CONSTITUTIONAL: No apparent distress but looks very weak today  EYES: PERRLA, without pallor or jaundice  ENT/MOUTH: Ears unremarkable. No oral lesions  CVS: s1s2 normal  RESPIRATORY: Chest is clear  GI: Abdomen has ostomy in place. There is generalized tenderness to palpation but no guarding or rigidity or rebound  NEURO: He is alert and oriented ×3  INTEGUMENT: no concerning his skin rashes . Port site is intact  LYMPHATIC: no palpable lymphadenopathy  MUSCULOSKELETAL: Unremarkable. No bony tenderness.   EXTREMITIES: no pedal edema  PSYCH: Mentation, mood and affect are appropriate        Laboratory/Imaging Studies    Reviewed      EXAMINATION: CT ABDOMEN PELVIS W CONTRAST, 1/26/2018 10:10 AM     TECHNIQUE:  Helical CT images from the lung bases through the  symphysis pubis were obtained with IV contrast. Contrast dose: 91 cc  of isovue 370     COMPARISON: CT abdomen pelvis 11/6/2017     HISTORY: pain;  metastatic cancer of sigmoid colon status post  laparotomy on 12/6/2017, peritoneal carcinomatosis, ulcerative  colitis.     FINDINGS:     Abdomen and pelvis: Postoperative changes of small bowel resection and  lower quadrant ileostomy. 5.2 x 4.5 x 3.6 cm proximal sigmoid mass  causing colonic obstruction with significant distention of cecum (7 cm  in diameter), ascending colon and proximal transverse colon. No  pneumatosis " or pneumoperitoneum. A small volume of ascites.  Redemonstration of findings concerning for peritoneal carcinomatosis  with peritoneal and omental implants particularly along the anterior  abdominal wall, increased from the prior study and also mesenteric  haziness as well as borderline enlarged retroperitoneal and mesenteric  lymph nodes.      Stable unremarkable appearance of the liver, gallbladder, adrenal  glands, spleen and pancreas. Decompressed small bowel loops. No  hydronephrosis. Unchanged few renal cysts. No abdominal aortic  aneurysm. Partially distended urinary bladder. Pelvic phleboliths.  Prostate calcifications. No inguinal lymphadenopathy. Pelvic  phleboliths.     Lung bases: Clear.     Bones and soft tissues: Left hip arthroplasty partially characterized  with the femoral head component well centered in the acetabulum.  Well-circumscribed 2.2 cm lucent focus in the left iliac bone,  superiorly to the acetabulum, stable since 6/21/2017. No acute or  aggressive appearing osseous lesion. Scattered sclerotic foci, stable,  likely representing benign bone islands. Degenerative changes of the  spine. Mild wedge compression deformity along the superior endplate of  the vertebral body T9, stable. Healing ventral incision. Old healed  left rib fractures.         IMPRESSION:   1. Colonic obstruction secondary to sigmoid adenocarcinoma without  pneumatosis or pneumoperitoneum.  2. Small volume of ascites with associated findings concerning for  peritoneal carcinomatosis, increased from the prior study..    He had CT scan of the chest and today but the final report is now back although when I looked at it I do not see any gross evidence of metastatic disease in the chest. We need to follow the final report.       NGS PANEL COLORECTAL    No mutations were identified in analyzed regions of KRAS, NRAS, BRAF, HRAS, or PIK3CA.       ASSESSMENT/PLAN:    Sebas has stage IV sigmoid adenocarcinoma with peritoneal  metastasis.  The sigmoid carcinoma is obstructing, requiring exploratory laparotomy and end ileostomy along with small bowel resection.    MSI intact and NGS panel does not reveal any identifiable mutations     He has been started on FOLFOX palliative chemotherapy. After 6 cycles, he had stable disease      On 12/6/17, he had Diagnostic laparoscopy and Exploratory laparotomy, but HIPEC was not performed as Peritoneal cancer index was 26 with diffuse involvement of the small bowel as well as the small bowel mesentery.     Repeat CT scan showed worsening peritoneal carcinomatosis and he is getting more symptomatic in terms of worsening abdominal pain and difficulty eating because of worsening abdominal cramping. He was given IV fluids and anti-emetics over the last few days and his pain medication regimen was also changed. He is now feeling a little better. We discussed that he needs to be restarted on chemotherapy and we will try to arrange that as soon as possible. We will again start with FOLFOX. Because of previous neuropathy will decrease the dose of oxaliplatin to 70 mg/m . In the future we can add Avastin and since he has wild type K-gabriela, he can be a candidate for anti-EGFR therapy as well. At this time I do not want to introduce stopped because I am not sure whether he would need any interventional procedures in the near future or not.    For the abdominal pain and cramping. We discussed that he should take Zofran every 8 hours. He also has Ativan at home and he can take it every 4-6 hours as needed. I advised him that he should try to increase his fluid intake if possible. I advised him to not to restart octreotide. He will follow with palliative care.      Nutrition. He has lost significant amount of weight and with recent abdominal pain and cramping which is likely in the setting of peritoneal carcinomatosis he is not able to take enough calories. We will try the other measures as mentioned above but if  those do not work then I believe he will need to be started on TPN. He will follow closely with nutritionist.    Possibility of a venting G-tube placement. I believe at this time we can hold off on that because his abdominal pain is better and he tells me that if he does not eat or drink then he does not have much of an abdominal pain. As mentioned above we will try the above-mentioned measures and if they are not successful then we will switch him to TPN. We can place a venting G-tube if need be    We did not address the following today   Previously he has received IV iron. His hemoglobin is stable now. Repeat ferritin is elevated and it shows findings c/w anemia of chronic disease. MCV has improved     I would like him to come back to the clinic on Monday to get reevaluated by nurse practitioner or PA    All of his and his wife's questions were answered to their satisfaction and they're agreeable and comfortable with the plan    Albert Long

## 2018-02-01 NOTE — PATIENT INSTRUCTIONS
Start FOLFOX asap    Take Zofran every 8 hrs    Can take ativan 0.5mg every 6 hrs    Cont Oxycontin and Oxycodone    See NP/PA back on Monday    Follow up with nutritionist and palliative care    Hold Octreotide for now

## 2018-02-01 NOTE — PROGRESS NOTES
Oncology/Hematology Visit Note  Jan 31, 2018    Reason for Visit: follow up of nausea, abdominal pain    History of Present Illness: Sebas Lopez is a 52 year old male with history of ulcerative colitis who presented with abdominal pain in May 2017. Imaging performed at that time showing sigmoid wall thickening with adjacent mesenteric lymphadenopathy with free fluid near the abdominal wall mess from prior hernia surgery. He underwent a colonoscopy that showed an obstructing sigmoid colon mass. Biopsy of the mass showing sigmoid adenocarcinoma. He then developed obstructive symptoms and underwent an exploratory laparotomy on 7/10/2017. During that procedure, he was found to have diffuse peritoneal carcinomatosis. Extensive lysis of adhesions was performed and a small bowel resection was performed with end ileostomy. The biopsies from multiple large peritoneal metastases were also positive for metastatic adenocarcinoma. He started FOLFOX (5-FU, oxaliplatin, leucovorin) on 8/11/17. He completed 6 cycles. He then underwent diagnostic laparoscopy and ex-lap on 12/6/17 with Dr. Dudley. HIPEC aborted due to diffuse carcinomatosis. He then presented to ED on 1/26 with abdominal pain and associated nausea. CT A/P on 1/26 with 5.2 x 4.5 x 3.6 cm proximal sigmoid mass causnig colonic obstriction with singificant distention of cecum (7cm in diameter), ascending colon and proximal transverse colon. No pneumatosis or pneumoperitoneum. Also small volume of ascites with associated findings concerning for peritoneal carcinomatosis, increased from prior studies. Dr. Dudley was consulted and recommended no surgical intervention, but possible cecotomy per IR if possible. Sebas was discharged home with trial of dilaudid prn. Octreotide held. He received IVF support, Zofran on 1/29.    Interval History:  Sebas returns today. He states that he has unable to drink more than small sips of water throughout the day since Monday. He  tried to drink some Ensure, but that caused more abdominal cramping and nausea. He is taking 10 mg of oxycodone every 4 hours for pain, and this has improved his pain control, but the pain relief wears off after about 3.5 hours. He has not been able to eat any solid food. He denies any vomiting. He did not take the Zofran as he did not understand that it was to prevent nausea. He does not think his abdominal distention is any worse. His ostomy put out 615mL in past 24 hours. He is voiding without difficulty. Denies any dizziness.     His 10-point review of systems is otherwise negative.       Current Outpatient Prescriptions   Medication Sig Dispense Refill     oxyCODONE (OXYCONTIN) 10 MG 12 hr tablet Take 1 tablet (10 mg) by mouth every 12 hours 60 tablet 0     ondansetron (ZOFRAN-ODT) 8 MG ODT tab Take 1 tablet (8 mg) by mouth every 8 hours as needed for nausea (Patient not taking: Reported on 1/31/2018) 30 tablet 1     oxyCODONE (OXY-IR) 5 MG capsule Take 1-2 capsules (5-10 mg) by mouth every 4 hours as needed for moderate to severe pain 60 capsule 0     omeprazole (PRILOSEC) 20 MG CR capsule Take 1 capsule (20 mg) by mouth daily (Patient not taking: Reported on 1/31/2018) 30 capsule 11     loperamide (IMODIUM) 2 MG capsule Take 2 capsules (4 mg) by mouth 4 times daily as needed for diarrhea (Patient not taking: Reported on 1/31/2018) 240 capsule 11     opium tincture tincture Take 0.6 mLs (6 mg) by mouth 4 times daily (Patient not taking: Reported on 1/31/2018) 72 mL 0     octreotide (SANDOSTATIN) 50 MCG/ML SOLN injection Inject 1 mL (50 mcg) Subcutaneous 3 times daily (Patient not taking: Reported on 1/29/2018) 90 mL 2     diphenoxylate-atropine (LOMOTIL) 2.5-0.025 MG per tablet Take 1-2 tablets by mouth 4 times daily as needed for diarrhea (Patient not taking: Reported on 1/31/2018) 240 tablet 5     amylase-lipase-protease (CREON) 97107 UNITS CPEP Take 2 capsules (72,000 Units) by mouth 3 times daily (with  meals) And 1 capsule with each snack tid. (Patient not taking: Reported on 1/31/2018) 240 capsule 3     LORazepam (ATIVAN) 0.5 MG tablet Take 1 tablet (0.5 mg) by mouth every 4 hours as needed (Anxiety, Nausea/Vomiting or Sleep) (Patient not taking: Reported on 1/31/2018) 30 tablet 5     magnesium oxide (MAG-OX) 400 MG tablet Take 1 tablet (400 mg) by mouth 2 times daily (Patient not taking: Reported on 1/31/2018) 60 tablet 1     Potassium Chloride 40 MEQ/15ML (20%) SOLN Take 15 mLs (40 mEq) by mouth daily (Patient not taking: Reported on 1/31/2018) 450 mL 3     Psyllium (METAMUCIL FIBER) 51.7 % PACK Take 1 packet by mouth 3 times daily (Patient not taking: Reported on 1/31/2018) 90 each 3     Ferrous Sulfate (IRON SUPPLEMENT PO) Take 325 mg by mouth 2 times daily (with meals)          Physical Examination:  General: The patient is a pleasant male in no acute distress.  /86 (BP Location: Right arm, Patient Position: Sitting, Cuff Size: Adult Regular)  Pulse 84  Temp 97.7  F (36.5  C) (Oral)  Resp 16  Wt 65.3 kg (143 lb 14.4 oz)  SpO2 99%  BMI 21.24 kg/m2  Wt Readings from Last 10 Encounters:   01/31/18 65.3 kg (143 lb 14.4 oz)   01/29/18 64.5 kg (142 lb 3.2 oz)   01/26/18 66.8 kg (147 lb 4.3 oz)   01/25/18 67.1 kg (148 lb)   12/21/17 65.9 kg (145 lb 5 oz)   12/14/17 65.8 kg (145 lb 1.6 oz)   12/06/17 69.9 kg (154 lb 1.6 oz)   11/30/17 67.6 kg (149 lb)   11/29/17 67.9 kg (149 lb 9.6 oz)   11/20/17 68.6 kg (151 lb 4.8 oz)     HEENT: EOMI, PERRL. Sclerae are anicteric. Oral mucosa is pink and moist with no lesions or thrush.   Heart: Regular rate and rhythm.   Lungs: Clear to auscultation bilaterally.   Abdomen: Bowel sounds present, soft, mildly tender on left side of adomen. Mild distention. Ostomy bag in place with brown liquid stool.   Extremities: No lower extremity edema noted bilaterally.   Neuro: Cranial nerves II through XII are grossly intact.  Skin: No rashes, petechiae, or bruising noted on  exposed skin.    Laboratory Data:  Results for orders placed or performed in visit on 01/31/18 (from the past 24 hour(s))   Comprehensive metabolic panel   Result Value Ref Range    Sodium 132 (L) 133 - 144 mmol/L    Potassium 3.7 3.4 - 5.3 mmol/L    Chloride 96 94 - 109 mmol/L    Carbon Dioxide 23 20 - 32 mmol/L    Anion Gap 13 3 - 14 mmol/L    Glucose 98 70 - 99 mg/dL    Urea Nitrogen 17 7 - 30 mg/dL    Creatinine 0.96 0.66 - 1.25 mg/dL    GFR Estimate 81 >60 mL/min/1.7m2    GFR Estimate If Black >90 >60 mL/min/1.7m2    Calcium 9.8 8.5 - 10.1 mg/dL    Bilirubin Total 0.6 0.2 - 1.3 mg/dL    Albumin 3.4 3.4 - 5.0 g/dL    Protein Total 9.0 (H) 6.8 - 8.8 g/dL    Alkaline Phosphatase 134 40 - 150 U/L    ALT 14 0 - 70 U/L    AST 15 0 - 45 U/L   *CBC with platelets differential   Result Value Ref Range    WBC 10.2 4.0 - 11.0 10e9/L    RBC Count 4.37 (L) 4.4 - 5.9 10e12/L    Hemoglobin 12.7 (L) 13.3 - 17.7 g/dL    Hematocrit 38.5 (L) 40.0 - 53.0 %    MCV 88 78 - 100 fl    MCH 29.1 26.5 - 33.0 pg    MCHC 33.0 31.5 - 36.5 g/dL    RDW 15.8 (H) 10.0 - 15.0 %    Platelet Count 485 (H) 150 - 450 10e9/L    Diff Method Automated Method     % Neutrophils 76.5 %    % Lymphocytes 9.1 %    % Monocytes 12.8 %    % Eosinophils 0.3 %    % Basophils 0.5 %    % Immature Granulocytes 0.8 %    Nucleated RBCs 0 0 /100    Absolute Neutrophil 7.8 1.6 - 8.3 10e9/L    Absolute Lymphocytes 0.9 0.8 - 5.3 10e9/L    Absolute Monocytes 1.3 0.0 - 1.3 10e9/L    Absolute Eosinophils 0.0 0.0 - 0.7 10e9/L    Absolute Basophils 0.1 0.0 - 0.2 10e9/L    Abs Immature Granulocytes 0.1 0 - 0.4 10e9/L    Absolute Nucleated RBC 0.0    Magnesium   Result Value Ref Range    Magnesium 1.6 1.6 - 2.3 mg/dL   Phosphorus   Result Value Ref Range    Phosphorus 2.6 2.5 - 4.5 mg/dL   Lactic acid   Result Value Ref Range    Lactic Acid 1.5 0.4 - 2.0 mmol/L         Assessment and Plan:    Abdominal pain, nausea: Pain control is improved with oxycodone 10mg q4h but continues  to be unable to eat or drink more than small sips due to increased nausea and pain. Spoke with radiology about his CT A/P on 1/26. The proximal sigmoid mass is about the same size as on previous CT. There does not appear to be any signs of obstruction proximal to the ileostomy. Spoke with Dr. Dudley from surgical oncology. He thinks Sebas's symptoms are more likely from ileus secondary to the peritoneal carcinomatosis. He states the sigmoid mass and distention of cecum might be compressing the small bowel, but less likely to be contributing much to his symptoms. He recommends a venting g-tube and TPN.   --Sebas given Emend today, along with some IV dilaudid and 1L NS IVF. His electrolytes are still WNL, although phos trending down. His Cr is improved today. Spoke with Sebas regarding admission to attempt to provide better pain and nausea control with the goal of being able to tolerate po intake versus starting him on oxycontin 10mg q12h in addition to oxycodone prn and have him try to take Zofran and Compazine at home 30 minutes prior to eating. Recommend he try Boost or juice based supplements as he was able to tolerate some juice today in infusion after receiving Emend and dilaudid. Sebas decided to try at home again with these measures. He will return tomorrow to see Dr. Long. In the meantime I have placed an IR consult and order for G-tube placement. Will need to call [facility] home infusion to discuss initiation of TPN if he continues to be unable to tolerate po intake.    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 S/p ex lap 7/2017 with small bowel resection and end ileostomy. S/p 6 cycles of FOLFOX. Admitted on 12/6/2017 for ex-lap with aborted HIPEC due to diffuse carcinomatosis.   --Next generation sequencing--no mutations were identified in analyzed regions of KRAS, NRAS, BRAF, HRAS or PIK3CA. Therefore, this patient may be eligible for anti-EGFR antibody therapy if clinically indicated    --MSI testing with no microsatellite instability.  --He will return to see Dr. Long on 2/1. As he already has had CT A/P, will d/c CT CAP order and change to chest CT only.      Jacque Dale PA-C  [facility]  [address] [place] [id number] [phone]

## 2018-02-01 NOTE — NURSING NOTE
Port de-accessed without complication. See flowsheet.  Carlie De La Paz, [facility]

## 2018-02-01 NOTE — MR AVS SNAPSHOT
After Visit Summary   2/1/2018    Sebas Lopez    MRN: [id number]           Patient Information     Date Of Birth          1965        Visit Information        Provider Department      2/1/2018 2:30 PM Albert Long MD [facility]        Today's Diagnoses     Peritoneal carcinomatosis (H)    -  1    Cancer of sigmoid colon (H)          Care Instructions    Start FOLFOX asap    Take Zofran every 8 hrs    Can take ativan 0.5mg every 6 hrs    Cont Oxycontin and Oxycodone    See NP/PA back on Monday    Follow up with nutritionist and palliative care    Hold Octreotide for now              Follow-ups after your visit        Your next 10 appointments already scheduled     Feb 05, 2018  9:15 AM CST   [facility] Lab Draw with  [facility] DRAW   [facility] ([facility])    [address] [place] [address]  [address]  [facility] [address]   [phone]            Feb 05, 2018  9:40 AM CST   (Arrive by 9:25 AM)   Return Visit with ANNAMARIE Espinosa   [facility] ([facility])    [address] [place] [address]  [address]  [facility] [address]   [phone]            Feb 07, 2018  1:30 PM CST   Infusion 240 with UC ONCOLOGY INFUSION, UC 32 ATC   [facility] ([facility])    [address] [place] [address]  [address]  [facility] [address]   [phone]            Feb 21, 2018 11:00 AM CST   (Arrive by 10:45 AM)   New Patient Visit with Karime Coronel RD   [facility] ([facility])    [address] [place] [address]  [address]  [facility] [address]   [phone]            Feb 21, 2018 12:30 PM CST   [facility] Lab Draw with  [facility] LAB DRAW   [facility] Draw ([facility])    [address] [place] [address]  [address]  [facility] [address]   [phone]            Feb 21, 2018  1:00 PM CST  "  Infusion 240 with UC ONCOLOGY INFUSION, UC 10 ATC   [facility] ([facility])    [address]  [address]  [facility] [address] [phone]            Mar 01, 2018  4:15 PM CST   (Arrive by 4:00 PM)   Return Visit with Sukhdeep Mota MD   [facility] ([facility])    [address]  [address]  [facility] [address] [phone]              Who to contact     If you have questions or need follow up information about today's clinic visit or your schedule please contact [facility] directly at [phone].  Normal or non-critical lab and imaging results will be communicated to you by [employer], letter or phone within 4 business days after the clinic has received the results. If you do not hear from us within 7 days, please contact the clinic through [employer] or phone. If you have a critical or abnormal lab result, we will notify you by phone as soon as possible.  Submit refill requests through [employer] or call your pharmacy and they will forward the refill request to us. Please allow 3 business days for your refill to be completed.          Additional Information About Your Visit        [employer] Information     [employer] gives you secure access to your electronic health record. If you see a primary care provider, you can also send messages to your care team and make appointments. If you have questions, please call your primary care clinic.  If you do not have a primary care provider, please call [phone] and they will assist you.        Care EveryWhere ID     This is your Care EveryWhere ID. This could be used by other organizations to access your [place] medical records  [id number]        Your Vitals Were     Pulse Temperature Respirations Height Pulse Oximetry BMI (Body Mass Index)    75 97.7  F (36.5  C) (Oral) 16 1.753 m (5' 9.02\") 97% 21.11 kg/m2       Blood Pressure from Last 3 " Encounters:   02/01/18 112/82   01/31/18 125/86   01/31/18 127/89    Weight from Last 3 Encounters:   02/01/18 64.9 kg (143 lb)   01/31/18 65.3 kg (143 lb 14.4 oz)   01/29/18 64.5 kg (142 lb 3.2 oz)              Today, you had the following     No orders found for display       Primary Care Provider Office Phone # Fax #    Jaguar Becker [phone]       [facility] [address]  [facility] [address]        Equal Access to Services     [facility]: Hadii aad ku hadasho Soomaali, waaxda luqadaha, qaybta kaalmada adebenitayarita, bhargav sage . So [facility] [phone].    ATENCIÓN: Si habla español, tiene a cid disposición servicios gratuitos de asistencia lingüística. [facility] [phone].    We comply with applicable federal civil rights laws and [place] laws. We do not discriminate on the basis of race, color, national origin, age, disability, sex, sexual orientation, or gender identity.            Thank you!     Thank you for choosing [facility]  for your care. Our goal is always to provide you with excellent care. Hearing back from our patients is one way we can continue to improve our services. Please take a few minutes to complete the written survey that you may receive in the mail after your visit with us. Thank you!             Your Updated Medication List - Protect others around you: Learn how to safely use, store and throw away your medicines at www.disposemymeds.org.          This list is accurate as of 2/1/18 11:59 PM.  Always use your most recent med list.                   Brand Name Dispense Instructions for use Diagnosis    amylase-lipase-protease 41034 UNITS Cpep    CREON    240 capsule    Take 2 capsules (72,000 Units) by mouth 3 times daily (with meals) And 1 capsule with each snack tid.    Diarrhea, unspecified type, Peritoneal carcinomatosis (H), Cancer of sigmoid colon (H)       diphenoxylate-atropine 2.5-0.025 MG per tablet     LOMOTIL    240 tablet    Take 1-2 tablets by mouth 4 times daily as needed for diarrhea    Diarrhea due to drug       IRON SUPPLEMENT PO      Take 325 mg by mouth 2 times daily (with meals)        loperamide 2 MG capsule    IMODIUM    240 capsule    Take 2 capsules (4 mg) by mouth 4 times daily as needed for diarrhea    Diarrhea, unspecified type       LORazepam 0.5 MG tablet    ATIVAN    30 tablet    Take 1 tablet (0.5 mg) by mouth every 4 hours as needed (Anxiety, Nausea/Vomiting or Sleep)    Peritoneal carcinomatosis (H), Cancer of sigmoid colon (H)       magnesium oxide 400 MG tablet    MAG-OX    60 tablet    Take 1 tablet (400 mg) by mouth 2 times daily    Hypomagnesemia       octreotide 50 MCG/ML Soln injection    sandoSTATIN    90 mL    Inject 1 mL (50 mcg) Subcutaneous 3 times daily    Diarrhea, unspecified type       omeprazole 20 MG CR capsule    priLOSEC    30 capsule    Take 1 capsule (20 mg) by mouth daily    Diarrhea, unspecified type       ondansetron 8 MG ODT tab    ZOFRAN-ODT    30 tablet    Take 1 tablet (8 mg) by mouth every 8 hours as needed for nausea    Nausea       opium tincture tincture     72 mL    Take 0.6 mLs (6 mg) by mouth 4 times daily    Diarrhea, unspecified type, Cancer of sigmoid colon (H), Peritoneal carcinomatosis (H)       * oxyCODONE 5 MG capsule    OXY-IR    60 capsule    Take 1-2 capsules (5-10 mg) by mouth every 4 hours as needed for moderate to severe pain    Peritoneal carcinomatosis (H), Cancer of sigmoid colon (H)       * oxyCODONE 10 MG 12 hr tablet    OXYCONTIN    60 tablet    Take 1 tablet (10 mg) by mouth every 12 hours    Peritoneal carcinomatosis (H), Cancer of sigmoid colon (H)       Potassium Chloride 40 MEQ/15ML (20%) Soln     450 mL    Take 15 mLs (40 mEq) by mouth daily    Hypokalemia       Psyllium 51.7 % Pack    METAMUCIL FIBER    90 each    Take 1 packet by mouth 3 times daily    Diarrhea, unspecified type       * Notice:  This list has 2 medication(s) that  are the same as other medications prescribed for you. Read the directions carefully, and ask your doctor or other care provider to review them with you.

## 2018-02-01 NOTE — LETTER
2/1/2018       RE: Sebas Lopez  [address] [place] [address]     Dear Colleague,    Thank you for referring your patient, Sebas Lopez, to the [facility]. Please see a copy of my visit note below.    Oncology follow up visit:  Date on this visit: 2/1/2018         CC  sigmoid adenocarcinoma with peritoneal carcinomatosis    Primary Physician: Waylon Han     History Of Present Illness:     Please see previous note for details. I have copied and updated from prior notes.   Sebas is a 52-year-old male who has a history of ulcerative colitis diagnosed more than 20 years ago, but he has not had medical management for that.  He was doing well, but in 05/2017 he presented with a few weeks of abdominal pain.  At that time, his imaging showed that he had a sigmoid wall thickening with adjacent mesenteric lymphadenopathy and free fluid near the abdominal wall mesh from his prior hernia surgery.  He subsequently underwent a colonoscopy which was incomplete and showed an obstructing sigmoid colon mass.  The biopsy from the sigmoid mass showed sigmoid adenocarcinoma.  He then developed obstructive symptoms and underwent exploratory laparotomy on 07/10/2017.  During that he was found to have diffuse peritoneal carcinomatosis.  Extensive lysis of adhesions was performed and a small bowel resection was performed with end ileostomy.  The biopsies from the multiple large peritoneal metastasis also were positive for metastatic adenocarcinoma. We still haven't received testing on MSI or NGS panel back     He started palliative FOLFOX on 8/11/17. C#6 given on 10/26/17  After 6 cycles, he has stable disease    On 12/6/17, he had Diagnostic laparoscopy and Exploratory laparotomy, but HIPEC was not performed as Peritoneal cancer index was 26 with diffuse involvement of the small bowel as well as the small bowel mesentery.     He then presented to ED on 1/26 with abdominal pain and  associated nausea. CT A/P on 1/26 with 5.2 x 4.5 x 3.6 cm proximal sigmoid mass causnig colonic obstriction with singificant distention of cecum (7cm in diameter), ascending colon and proximal transverse colon. No pneumatosis or pneumoperitoneum. Also small volume of ascites with associated findings concerning for peritoneal carcinomatosis, increased from prior studies. Dr. Dudley was consulted and recommended no surgical intervention.    He was then seen in clinic on 1/29/2018 as well as yesterday because for the last few days he was unable to tolerate solid foods and he was getting abdominal cramping and some nausea. He also noticed that his ostomy output decreased. He was given IV fluids as well as antibiotics and yesterday he was started on OxyContin 10 mg twice a day along with p.r.n. oxycodone. Of note a few weeks ago he was started on short acting octreotide to decrease the ostomy output but he stopped taking it about one week ago and few days prior to that he noticed worsening of the abdominal cramping.  He comes in today and tells me that he is feeling a little better. He continues to have abdominal cramping if he drinks fluids. He is not taking solid food and is basically taking water, and showed, boost, protein drinks but it makes him have abdominal cramps. Ostomy output is now better and today he had 2 bags of output. As mentioned above he is not taking octreotide for the last 1 week also. He had some nausea for which Zofran really helped. No vomiting. He denies any bleeding. No fevers. He thinks his abdominal pain is also better now that he has a started OxyContin 10 mg twice a day so he is taking 5 mg of oxycodone every 4 hours as opposed to 10 mg of oxycodone every 4 hours which he was taking prior to starting OxyContin  Denies any trouble breathing. He feels generalized weakness and has lost significant amount of weight. Currently he does not have any neuropathy although he felt neuropathy when he  was on oxaliplatin.      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      HERNIA REPAIR       INSERT PORT VASCULAR ACCESS Right 8/10/2017    Procedure: INSERT PORT VASCULAR ACCESS;  Single Lumen Right Chest Power Port;  Surgeon: Malena Andrew PA-C;  Location: [facility]     LAPAROSCOPY DIAGNOSTIC (GENERAL) N/A 12/6/2017    Procedure: LAPAROSCOPY DIAGNOSTIC (GENERAL);  Diagnostic Laparoscopy, Exploratory Laparotomy Anesthesia Block ;  Surgeon: Rob Dudley MD;  Location: [facility]     LAPAROTOMY EXPLORATORY N/A 12/6/2017    Procedure: LAPAROTOMY EXPLORATORY;;  Surgeon: Rob Dudley MD;  Location: [facility]     ORTHOPEDIC SURGERY Left     HIP ARTHROPLASTY      Ulcerative colitis.  Left hip replacement.       Cancer History:   As above    Allergies:  Allergies as of 02/01/2018     (No Known Allergies)     Current Medications:  Current Outpatient Prescriptions   Medication Sig Dispense Refill     oxyCODONE (OXYCONTIN) 10 MG 12 hr tablet Take 1 tablet (10 mg) by mouth every 12 hours 60 tablet 0     ondansetron (ZOFRAN-ODT) 8 MG ODT tab Take 1 tablet (8 mg) by mouth every 8 hours as needed for nausea 30 tablet 1     oxyCODONE (OXY-IR) 5 MG capsule Take 1-2 capsules  (5-10 mg) by mouth every 4 hours as needed for moderate to severe pain 60 capsule 0     omeprazole (PRILOSEC) 20 MG CR capsule Take 1 capsule (20 mg) by mouth daily (Patient not taking: Reported on 2/1/2018) 30 capsule 11     loperamide (IMODIUM) 2 MG capsule Take 2 capsules (4 mg) by mouth 4 times daily as needed for diarrhea (Patient not taking: Reported on 2/1/2018) 240 capsule 11     opium tincture tincture Take 0.6 mLs (6 mg) by mouth 4 times daily (Patient not taking: Reported on 2/1/2018) 72 mL 0     octreotide (SANDOSTATIN) 50 MCG/ML SOLN injection Inject 1 mL (50 mcg) Subcutaneous 3 times daily (Patient not taking: Reported on 2/1/2018) 90 mL 2     diphenoxylate-atropine (LOMOTIL) 2.5-0.025 MG per tablet Take 1-2 tablets by mouth 4 times daily as needed for diarrhea (Patient not taking: Reported on 1/31/2018) 240 tablet 5     amylase-lipase-protease (CREON) 87023 UNITS CPEP Take 2 capsules (72,000 Units) by mouth 3 times daily (with meals) And 1 capsule with each snack tid. (Patient not taking: Reported on 1/31/2018) 240 capsule 3     LORazepam (ATIVAN) 0.5 MG tablet Take 1 tablet (0.5 mg) by mouth every 4 hours as needed (Anxiety, Nausea/Vomiting or Sleep) (Patient not taking: Reported on 2/1/2018) 30 tablet 5     magnesium oxide (MAG-OX) 400 MG tablet Take 1 tablet (400 mg) by mouth 2 times daily (Patient not taking: Reported on 2/1/2018) 60 tablet 1     Potassium Chloride 40 MEQ/15ML (20%) SOLN Take 15 mLs (40 mEq) by mouth daily (Patient not taking: Reported on 2/1/2018) 450 mL 3     Psyllium (METAMUCIL FIBER) 51.7 % PACK Take 1 packet by mouth 3 times daily (Patient not taking: Reported on 2/1/2018) 90 each 3     Ferrous Sulfate (IRON SUPPLEMENT PO) Take 325 mg by mouth 2 times daily (with meals)         Family History:  Family History   Problem Relation Age of Onset     Hypertension Father      DIABETES Father       No family history of cancer.  He does not have any kids.       Social History:  Social  "History     Social History     Marital status: Single     Spouse name: N/A     Number of children: N/A     Years of education: N/A     Occupational History     Not on file.     Social History Main Topics     Smoking status: Never Smoker     Smokeless tobacco: Never Used     Alcohol use 3.0 oz/week     5 Cans of beer per week      Comment: none for last 4 months     Drug use: No     Sexual activity: Not on file     Other Topics Concern     Not on file     Social History Narrative   He does not smoke and does not drink.  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  He lives with his girlfriend, Bessie.       Physical Exam:  /82 (BP Location: Left arm, Patient Position: Chair, Cuff Size: Adult Regular)  Pulse 75  Temp 97.7  F (36.5  C) (Oral)  Resp 16  Ht 1.753 m (5' 9.02\")  Wt 64.9 kg (143 lb)  SpO2 97%  BMI 21.11 kg/m2  CONSTITUTIONAL: No apparent distress but looks very weak today  EYES: PERRLA, without pallor or jaundice  ENT/MOUTH: Ears unremarkable. No oral lesions  CVS: s1s2 normal  RESPIRATORY: Chest is clear  GI: Abdomen has ostomy in place. There is generalized tenderness to palpation but no guarding or rigidity or rebound  NEURO: He is alert and oriented ×3  INTEGUMENT: no concerning his skin rashes . Port site is intact  LYMPHATIC: no palpable lymphadenopathy  MUSCULOSKELETAL: Unremarkable. No bony tenderness.   EXTREMITIES: no pedal edema  PSYCH: Mentation, mood and affect are appropriate        Laboratory/Imaging Studies    Reviewed      EXAMINATION: CT ABDOMEN PELVIS W CONTRAST, 1/26/2018 10:10 AM     TECHNIQUE:  Helical CT images from the lung bases through the  symphysis pubis were obtained with IV contrast. Contrast dose: 91 cc  of isovue 370     COMPARISON: CT abdomen pelvis 11/6/2017     HISTORY: pain;  metastatic cancer of sigmoid colon status post  laparotomy on 12/6/2017, peritoneal carcinomatosis, ulcerative  colitis.     FINDINGS:     Abdomen and pelvis: Postoperative " changes of small bowel resection and  lower quadrant ileostomy. 5.2 x 4.5 x 3.6 cm proximal sigmoid mass  causing colonic obstruction with significant distention of cecum (7 cm  in diameter), ascending colon and proximal transverse colon. No  pneumatosis or pneumoperitoneum. A small volume of ascites.  Redemonstration of findings concerning for peritoneal carcinomatosis  with peritoneal and omental implants particularly along the anterior  abdominal wall, increased from the prior study and also mesenteric  haziness as well as borderline enlarged retroperitoneal and mesenteric  lymph nodes.      Stable unremarkable appearance of the liver, gallbladder, adrenal  glands, spleen and pancreas. Decompressed small bowel loops. No  hydronephrosis. Unchanged few renal cysts. No abdominal aortic  aneurysm. Partially distended urinary bladder. Pelvic phleboliths.  Prostate calcifications. No inguinal lymphadenopathy. Pelvic  phleboliths.     Lung bases: Clear.     Bones and soft tissues: Left hip arthroplasty partially characterized  with the femoral head component well centered in the acetabulum.  Well-circumscribed 2.2 cm lucent focus in the left iliac bone,  superiorly to the acetabulum, stable since 6/21/2017. No acute or  aggressive appearing osseous lesion. Scattered sclerotic foci, stable,  likely representing benign bone islands. Degenerative changes of the  spine. Mild wedge compression deformity along the superior endplate of  the vertebral body T9, stable. Healing ventral incision. Old healed  left rib fractures.         IMPRESSION:   1. Colonic obstruction secondary to sigmoid adenocarcinoma without  pneumatosis or pneumoperitoneum.  2. Small volume of ascites with associated findings concerning for  peritoneal carcinomatosis, increased from the prior study..    He had CT scan of the chest and today but the final report is now back although when I looked at it I do not see any gross evidence of metastatic disease  in the chest. We need to follow the final report.       NGS PANEL COLORECTAL    No mutations were identified in analyzed regions of KRAS, NRAS, BRAF, HRAS, or PIK3CA.       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  The sigmoid carcinoma is obstructing, requiring exploratory laparotomy and end ileostomy along with small bowel resection.    MSI intact and NGS panel does not reveal any identifiable mutations     He has been started on FOLFOX palliative chemotherapy. After 6 cycles, he had stable disease      On 12/6/17, he had Diagnostic laparoscopy and Exploratory laparotomy, but HIPEC was not performed as Peritoneal cancer index was 26 with diffuse involvement of the small bowel as well as the small bowel mesentery.     Repeat CT scan showed worsening peritoneal carcinomatosis and he is getting more symptomatic in terms of worsening abdominal pain and difficulty eating because of worsening abdominal cramping. He was given IV fluids and anti-emetics over the last few days and his pain medication regimen was also changed. He is now feeling a little better. We discussed that he needs to be restarted on chemotherapy and we will try to arrange that as soon as possible. We will again start with FOLFOX. Because of previous neuropathy will decrease the dose of oxaliplatin to 70 mg/m . In the future we can add Avastin and since he has wild type K-gabriela, he can be a candidate for anti-EGFR therapy as well. At this time I do not want to introduce stopped because I am not sure whether he would need any interventional procedures in the near future or not.    For the abdominal pain and cramping. We discussed that he should take Zofran every 8 hours. He also has Ativan at home and he can take it every 4-6 hours as needed. I advised him that he should try to increase his fluid intake if possible. I advised him to not to restart octreotide. He will follow with palliative  care.      Nutrition. He has lost significant amount of weight and with recent abdominal pain and cramping which is likely in the setting of peritoneal carcinomatosis he is not able to take enough calories. We will try the other measures as mentioned above but if those do not work then I believe he will need to be started on TPN. He will follow closely with nutritionist.    Possibility of a venting G-tube placement. I believe at this time we can hold off on that because his abdominal pain is better and he tells me that if he does not eat or drink then he does not have much of an abdominal pain. As mentioned above we will try the above-mentioned measures and if they are not successful then we will switch him to TPN. We can place a venting G-tube if need be    We did not address the following today   Previously he has received IV iron. His hemoglobin is stable now. Repeat ferritin is elevated and it shows findings c/w anemia of chronic disease. MCV has improved     I would like him to come back to the clinic on Monday to get reevaluated by nurse practitioner or PA    All of his and his wife's questions were answered to their satisfaction and they're agreeable and comfortable with the plan    Albert Long

## 2018-02-05 PROBLEM — C18.9 COLON CANCER (H): Status: ACTIVE | Noted: 2018-01-01

## 2018-02-05 NOTE — PROGRESS NOTES
Colorectal Surgery     Please see Consult Note 1/26/2018. This patient was seen at the time in the ED but not ultimately admitted. This admission represents an expected progression of disease.     A/P: 52 year old with non-resectable diffuse adenocarcinoma involving small bowel, now sigmoid, with carcinomatosis, recently aborted plans for cytoreductive surgery/HIPEC and followed by Palliative Care who presents with abdominal pain, nausea, malaise and diffuse colonic dilation with ongoing stool/gas output from diverting ileostomy who would benefit from palliative decompression.  - Recommend IR consult for cecostomy tube  - Recommend Palliative Care consult for goals of care/symptomatic management    Subjective: Ongoing abdominal pain, nausea, poor PO, fuzzy head, no vomiting, ongoing stool/gas from stoma    Ill-appearing, tired looking, NAD  NLB on RA  RRR  Abdomen distended, firm, mildly tender  Extremities WWP, no lower extremity edema    Labs:    Notable for WBC of 36, Lactate of 1.0, Na of 123    Imaging:    Reviewed in EPIC.     Discussed with Ray Bianchi and Luis Enrique

## 2018-02-05 NOTE — PLAN OF CARE
Problem: Patient Care Overview  Goal: Plan of Care/Patient Progress Review  Outcome: No Change  Nursing Focus: Admission  D: Arrived at 1115 from clinic via EMS. Patient accompanied by mother and father. Admitted for high WBC count. Complains of slight nausea and abdominal pain.      I: Admission process began.  Patient oriented to room, enviroment, call light.  MD notified of patient's arrival on unit.     A: Vital signs stable, afebrile. Patient stable at this time.  Complaining of nausea, scheduled Zofran given x1. No emesis. Pt. c/o some abdominal pain, scheduled Oxycotin given x1 for some relief. Pt. scheduled for abdominal and pelvis CT. Fair appetite per pt. Voiding spontaneously, UA/UC sent. Ileostomy intact, Pt. putting out large amounts of loose, watery stool. Stool sample sent to rule out C-diff. Up with SBA. Generalized weakness.     P: Implement plan of care when available. Continue to monitor patient. Nursing interventions as appropriate. Notify MD with changes in pt status.

## 2018-02-05 NOTE — LETTER
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 Imaging performed at that time showing sigmoid wall thickening with adjacent mesenteric lymphadenopathy with free fluid near the abdominal wall mess from prior hernia surgery. He underwent a colonoscopy that showed an obstructing sigmoid colon mass. Biopsy of the mass showing sigmoid adenocarcinoma. He then developed obstructive symptoms and underwent an exploratory laparotomy on 7/10/2017. During that procedure, he was found to have diffuse peritoneal carcinomatosis. Extensive lysis of adhesions was performed and a small bowel resection was performed with end ileostomy. The biopsies from multiple large peritoneal metastases were also positive for metastatic adenocarcinoma. He started FOLFOX (5-FU, oxaliplatin, leucovorin) on 8/11/17. He completed 6 cycles. He then underwent diagnostic laparoscopy and ex-lap on 12/6/17 with Dr. Dudley. HIPEC aborted due to diffuse carcinomatosis. He then presented to ED on 1/26 with abdominal pain and associated nausea. CT A/P on 1/26 with 5.2 x 4.5 x 3.6 cm proximal sigmoid mass causnig colonic obstriction with singificant distention of cecum (7cm in diameter), ascending colon and proximal transverse colon. No pneumatosis or pneumoperitoneum. Also small volume of ascites with associated findings concerning for peritoneal carcinomatosis, increased from prior studies. Dr. Dudley was consulted and recommended no surgical intervention, but possible cecotomy per IR if possible. Sebas was discharged home with trial of dilaudid prn. Octreotide held. He received IVF support, Zofran on 1/29.    Interval History:  Sebas  returns today with his parents. He has a record with him kept by his girlfriend. Per report, he had a lot of abdominal cramping on Saturday which has improved since then. He is on oxycontin BID, and taking 10 mg of oxycodone q4h prn on a regular basis. He denies any nausea at the moment, and he is not in too much pain right now. Denies any vomiting. It is unclear whether he has been taking the scheduled Zofran and Sebas is unsure, but thinks he has been. It appears he is taking ativan every 4-6 hours. He has had about 2-4 10oz clear Ensure since Saturday, and some additional beverages, although not recorded total amount. He is unsure how much his ostomy has been putting out daily and this is not recorded on the sheet he brought in with him. Per parents and girlfriend's report, Sebas has been a little confused at times, resting most of the day and requiring assistance to get out of bed, go to bathroom, etc. He denies any fever, chills. He denies dysuria, but he states urine may be smaller in volume and darker. He denies cough, SOB, chest pain. He has been feeling a little lightheaded whenever he is out of bed and overall weaker since last week, but denies any syncope or falls.    Current Outpatient Prescriptions   Medication Sig Dispense Refill     oxyCODONE (OXYCONTIN) 10 MG 12 hr tablet Take 1 tablet (10 mg) by mouth every 12 hours 60 tablet 0     ondansetron (ZOFRAN-ODT) 8 MG ODT tab Take 1 tablet (8 mg) by mouth every 8 hours as needed for nausea 30 tablet 1     oxyCODONE (OXY-IR) 5 MG capsule Take 1-2 capsules (5-10 mg) by mouth every 4 hours as needed for moderate to severe pain 60 capsule 0     omeprazole (PRILOSEC) 20 MG CR capsule Take 1 capsule (20 mg) by mouth daily (Patient not taking: Reported on 2/1/2018) 30 capsule 11     loperamide (IMODIUM) 2 MG capsule Take 2 capsules (4 mg) by mouth 4 times daily as needed for diarrhea (Patient not taking: Reported on 2/1/2018) 240 capsule 11     opium tincture  "tincture Take 0.6 mLs (6 mg) by mouth 4 times daily (Patient not taking: Reported on 2/1/2018) 72 mL 0     octreotide (SANDOSTATIN) 50 MCG/ML SOLN injection Inject 1 mL (50 mcg) Subcutaneous 3 times daily (Patient not taking: Reported on 2/1/2018) 90 mL 2     diphenoxylate-atropine (LOMOTIL) 2.5-0.025 MG per tablet Take 1-2 tablets by mouth 4 times daily as needed for diarrhea (Patient not taking: Reported on 1/31/2018) 240 tablet 5     amylase-lipase-protease (CREON) 55055 UNITS CPEP Take 2 capsules (72,000 Units) by mouth 3 times daily (with meals) And 1 capsule with each snack tid. (Patient not taking: Reported on 1/31/2018) 240 capsule 3     LORazepam (ATIVAN) 0.5 MG tablet Take 1 tablet (0.5 mg) by mouth every 4 hours as needed (Anxiety, Nausea/Vomiting or Sleep) (Patient not taking: Reported on 2/1/2018) 30 tablet 5     magnesium oxide (MAG-OX) 400 MG tablet Take 1 tablet (400 mg) by mouth 2 times daily (Patient not taking: Reported on 2/1/2018) 60 tablet 1     Potassium Chloride 40 MEQ/15ML (20%) SOLN Take 15 mLs (40 mEq) by mouth daily (Patient not taking: Reported on 2/1/2018) 450 mL 3     Psyllium (METAMUCIL FIBER) 51.7 % PACK Take 1 packet by mouth 3 times daily (Patient not taking: Reported on 2/1/2018) 90 each 3     Ferrous Sulfate (IRON SUPPLEMENT PO) Take 325 mg by mouth 2 times daily (with meals)          Physical Examination:  General: Appears more fatigued today, mildly cachexic male in no acute distress.  /86 (BP Location: Right arm, Patient Position: Sitting, Cuff Size: Adult Regular)  Pulse 114  Temp 96  F (35.6  C) (Oral)  Resp 16  Ht 1.753 m (5' 9\")  Wt 66.5 kg (146 lb 11.2 oz)  SpO2 98%  BMI 21.66 kg/m2  Wt Readings from Last 10 Encounters:   02/05/18 66.5 kg (146 lb 11.2 oz)   02/01/18 64.9 kg (143 lb)   01/31/18 65.3 kg (143 lb 14.4 oz)   01/29/18 64.5 kg (142 lb 3.2 oz)   01/26/18 66.8 kg (147 lb 4.3 oz)   01/25/18 67.1 kg (148 lb)   12/21/17 65.9 kg (145 lb 5 oz)   12/14/17 " 65.8 kg (145 lb 1.6 oz)   12/06/17 69.9 kg (154 lb 1.6 oz)   11/30/17 67.6 kg (149 lb)     HEENT: EOMI, PERRL. Sclerae are anicteric. Oral mucosa is pink and moist with no lesions or thrush.   Heart: Tachycardic, regular rhythm.   Lungs: Clear to auscultation bilaterally.   Abdomen: Bowel sounds present, soft, more tender on left side of abdomen today. Mild distention. Ostomy bag in place with brown liquid stool.   Extremities: No lower extremity edema noted bilaterally.   Neuro: Cranial nerves II through XII are grossly intact.  Skin: No rashes, petechiae, or bruising noted on exposed skin.    Laboratory Data:  Results for orders placed or performed in visit on 02/05/18 (from the past 24 hour(s))   *CBC with platelets differential   Result Value Ref Range    WBC 36.7 (H) 4.0 - 11.0 10e9/L    RBC Count 3.93 (L) 4.4 - 5.9 10e12/L    Hemoglobin 11.4 (L) 13.3 - 17.7 g/dL    Hematocrit 32.0 (L) 40.0 - 53.0 %    MCV 81 78 - 100 fl    MCH 29.0 26.5 - 33.0 pg    MCHC 35.6 31.5 - 36.5 g/dL    RDW 15.5 (H) 10.0 - 15.0 %    Platelet Count 382 150 - 450 10e9/L    Diff Method Automated Method     % Neutrophils 83.1 %    % Lymphocytes 3.4 %    % Monocytes 7.9 %    % Eosinophils 0.2 %    % Basophils 0.4 %    % Immature Granulocytes 5.0 %    Nucleated RBCs 0 0 /100    Absolute Neutrophil 30.5 (H) 1.6 - 8.3 10e9/L    Absolute Lymphocytes 1.2 0.8 - 5.3 10e9/L    Absolute Monocytes 2.9 (H) 0.0 - 1.3 10e9/L    Absolute Eosinophils 0.1 0.0 - 0.7 10e9/L    Absolute Basophils 0.2 0.0 - 0.2 10e9/L    Abs Immature Granulocytes 1.8 (H) 0 - 0.4 10e9/L    Absolute Nucleated RBC 0.0    Comprehensive metabolic panel   Result Value Ref Range    Sodium 123 (L) 133 - 144 mmol/L    Potassium 3.5 3.4 - 5.3 mmol/L    Chloride 86 (L) 94 - 109 mmol/L    Carbon Dioxide 24 20 - 32 mmol/L    Anion Gap 12 3 - 14 mmol/L    Glucose 167 (H) 70 - 99 mg/dL    Urea Nitrogen 40 (H) 7 - 30 mg/dL    Creatinine 1.57 (H) 0.66 - 1.25 mg/dL    GFR Estimate 46 (L) >60  mL/min/1.7m2    GFR Estimate If Black 56 (L) >60 mL/min/1.7m2    Calcium 8.7 8.5 - 10.1 mg/dL    Bilirubin Total 0.6 0.2 - 1.3 mg/dL    Albumin 2.5 (L) 3.4 - 5.0 g/dL    Protein Total 8.1 6.8 - 8.8 g/dL    Alkaline Phosphatase 213 (H) 40 - 150 U/L    ALT 12 0 - 70 U/L    AST 18 0 - 45 U/L   Magnesium   Result Value Ref Range    Magnesium 2.0 1.6 - 2.3 mg/dL   Phosphorus   Result Value Ref Range    Phosphorus 3.4 2.5 - 4.5 mg/dL         Assessment and Plan: 51 yo male with hx of ulcerative colitis and 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 He presented today for evaluation prior to resuming FOLFOX but noted to have PAULINA, hyponatremia, leukocytosis, abdominal pain. Will admit to hospital for further evaluation.    Leukocytosis: WBC 36.7, ANC 30.5. Tachycardic, but not hypotensive--BP  124/86. Afebrile. Concern for intra-abdominal infection given abdominal pain and recent CT scans with colonic mass and large bowel dilatation. Abdominal exam with tenderness L>R, but no peritoneal signs. Chest CT on 2/1 with no acute infection. May need further work up including UA, Cdiff. Consider BCx also if becomes febrile.    PAULINA: Cr 1.57 today. Baseline Cr ~0.9-1. BUN/Cr ratio elevated. Likely 2/2 dehydration, but need to r/o infection also possible etiology due to elevated WBC.    FEN: Sodium 123, Cl 86. Suspect secondary to nausea and hypovolemia due to insufficient po intake and losses from ostomy output.    Abdominal pain, nausea: Pain control is improved on oxycontin 10mg q12h with oxycodone 10mg q4h prn. Plan had been to schedule Zofran 8mg TID--unclear if he has been taking. It appears he is also taking ativan prn. No reported vomiting, but po intake is still limited to small amount of liquids. Spoke last week with radiology about his CT A/P from 1/26. The proximal sigmoid mass is about the same size as on previous CT. There does not appear to be any signs of obstruction proximal to the ileostomy. Spoke  with Dr. Dudley from surgical oncology last week. He thinks Sebas's symptoms are more likely from ileus secondary to the peritoneal carcinomatosis. He states the sigmoid mass and distention of cecum might be compressing the small bowel, but less likely to be contributing much to his symptoms.   --Plan to start TPN if he is unable to take enough po. Can place venting g tube if needed.    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 S/p ex lap 7/2017 with small bowel resection and end ileostomy. S/p 6 cycles of FOLFOX. Admitted on 12/6/2017 for ex-lap with aborted HIPEC due to diffuse carcinomatosis. Plan to resume FOLFOX with oxaliplatin dose reduced to 70mg/m2 due to neuropathy. Holding off of Avastin as he may need interventional procedures in the near future.  --Defer chemo now for evaluation of acute issues above.    Jacque Dale PA-C  [facility]  [address] [place] [address] [phone]

## 2018-02-05 NOTE — IP AVS SNAPSHOT
"    [address] [facility]: [phone]                                              INTERAGENCY TRANSFER FORM - PHYSICIAN ORDERS   2018                    Hospital Admission Date: 2018  KARINA MINER   : 1965  Sex: Male        Attending Provider: Tammy Palacio MD     Allergies:  No Known Allergies    Infection:  None   Service:  ONCOLOGY    Ht:  1.753 m (5' 9\")   Wt:  64.1 kg (141 lb 4.8 oz)   Admission Wt:  65.3 kg (143 lb 14.4 oz)    BMI:  20.87 kg/m 2   BSA:  1.77 m 2            Patient PCP Information     Provider PCP Type    NGUYỄN HERNÁNDEZ MD General      ED Clinical Impression     Diagnosis Description Comment Added By Time Added    Large bowel obstruction [K56.609] Large bowel obstruction [K56.609]  Karina Menon RN 2018  3:17 PM    Peritoneal carcinomatosis (H) [C78.6, C80.1] Peritoneal carcinomatosis (H) [C78.6, C80.1]  Es Dumont APRN CNP 2018 11:12 AM    Cancer of sigmoid colon (H) [C18.7] Cancer of sigmoid colon (H) [C18.7]  Es Dumont APRN CNP 2018 11:12 AM    Nausea [R11.0] Nausea [R11.0]  Es Dumont APRN CNP 2018 11:13 AM      Hospital Problems as of 2018              Priority Class Noted POA    Colon cancer (H) Medium  2018 Yes      Non-Hospital Problems as of 2018              Priority Class Noted    Peritoneal carcinomatosis (H) Medium  8/3/2017    Cancer of sigmoid colon (H) Medium  8/3/2017    Iron deficiency anemia due to chronic blood loss Medium  2017    Diarrhea, unspecified type Medium  2017    Hypokalemia Medium  2017    Large bowel obstruction Medium  2018      Code Status History     Date Active Date Inactive Code Status Order ID Comments User Context    2018 11:18 AM  Full Code [id number]  Es Dumont APRN CNP Outpatient    2018 11:19 AM 2018 11:18 AM Full Code [id number]  Es Dumont APRN CNP Inpatient    2017  2:12 PM 2018 11:19 AM Full Code " [id number]  Freddie Tadeo MD Outpatient    12/6/2017 11:44 AM 12/7/2017  2:12 PM Full Code [id number]  Rob Dudley MD Inpatient         Medication Review      CONTINUE these medications which may have CHANGED, or have new prescriptions. If we are uncertain of the size of tablets/capsules you have at home, strength may be listed as something that might have changed.        Dose / Directions Comments    ondansetron 8 MG ODT tab   Commonly known as:  ZOFRAN-ODT   This may have changed:    - when to take this  - reasons to take this   Used for:  Nausea        Dose:  8 mg   Take 1 tablet (8 mg) by mouth every 8 hours   Quantity:  30 tablet   Refills:  1          CONTINUE these medications which have NOT CHANGED        Dose / Directions Comments    amylase-lipase-protease 69924 UNITS Cpep   Commonly known as:  CREON   Used for:  Diarrhea, unspecified type, Peritoneal carcinomatosis (H), Cancer of sigmoid colon (H)        Dose:  2 capsule   Take 2 capsules (72,000 Units) by mouth 3 times daily (with meals) And 1 capsule with each snack tid.   Quantity:  240 capsule   Refills:  3    Mail to patient if not yet due to be filled.       diphenoxylate-atropine 2.5-0.025 MG per tablet   Commonly known as:  LOMOTIL   Used for:  Diarrhea due to drug        Dose:  1-2 tablet   Take 1-2 tablets by mouth 4 times daily as needed for diarrhea   Quantity:  240 tablet   Refills:  5        IRON SUPPLEMENT PO        Dose:  325 mg   Take 325 mg by mouth 2 times daily (with meals)   Refills:  0        loperamide 2 MG capsule   Commonly known as:  IMODIUM   Used for:  Diarrhea, unspecified type        Dose:  4 mg   Take 2 capsules (4 mg) by mouth 4 times daily as needed for diarrhea   Quantity:  240 capsule   Refills:  11        LORazepam 0.5 MG tablet   Commonly known as:  ATIVAN   Used for:  Peritoneal carcinomatosis (H), Cancer of sigmoid colon (H)        Dose:  0.5 mg   Take 1 tablet (0.5 mg) by mouth every 4 hours as  needed (Anxiety, Nausea/Vomiting or Sleep)   Quantity:  30 tablet   Refills:  5        omeprazole 20 MG CR capsule   Commonly known as:  priLOSEC   Used for:  Diarrhea, unspecified type        Dose:  20 mg   Take 1 capsule (20 mg) by mouth daily   Quantity:  30 capsule   Refills:  11        opium tincture tincture   Used for:  Diarrhea, unspecified type, Cancer of sigmoid colon (H), Peritoneal carcinomatosis (H)        Dose:  6 mg   Take 0.6 mLs (6 mg) by mouth 4 times daily   Quantity:  72 mL   Refills:  0        * oxyCODONE 10 MG 12 hr tablet   Commonly known as:  OXYCONTIN   Used for:  Peritoneal carcinomatosis (H), Cancer of sigmoid colon (H)        Dose:  10 mg   Take 1 tablet (10 mg) by mouth every 12 hours   Quantity:  60 tablet   Refills:  0        * oxyCODONE 5 MG capsule   Commonly known as:  OXY-IR   Used for:  Peritoneal carcinomatosis (H), Cancer of sigmoid colon (H)        Dose:  5-10 mg   Take 1-2 capsules (5-10 mg) by mouth every 4 hours as needed for moderate to severe pain   Quantity:  60 capsule   Refills:  0        Psyllium 51.7 % Pack   Commonly known as:  METAMUCIL FIBER   Used for:  Diarrhea, unspecified type        Dose:  1 packet   Take 1 packet by mouth 3 times daily   Quantity:  90 each   Refills:  3        * Notice:  This list has 2 medication(s) that are the same as other medications prescribed for you. Read the directions carefully, and ask your doctor or other care provider to review them with you.      STOP taking     magnesium oxide 400 MG tablet   Commonly known as:  MAG-OX           octreotide 50 MCG/ML Soln injection   Commonly known as:  sandoSTATIN           Potassium Chloride 40 MEQ/15ML (20%) Soln                   Summary of Visit     Reason for your hospital stay       You were admitted with abdominal pain, nausea/vomiting. A CT scan showed colonic blockage, so a stent was placed.             After Care     Activity       Your activity upon discharge: as tolerated.        Diet       Follow this diet upon discharge: TPN, advance diet as tolerated.       Discharge Instructions       It is important that you take you anti nausea medication (zofran) every 8 hours scheduled to help control nausea.  If you feel that you are becoming constipated, let your provider know, as this can be a side effect of zofran.      Advance you diet slowly -- start with low residue / low fat foods, and work your way back to regular diet.             Referrals     Home infusion referral       Home TPN per physician orders.    -------------------------  CVC Line       Port A Cath Single 08/10/17 Right Chest wall    ------------------------    The following homecare is recommended:  RN skilled nursing visit. RN to assess vital signs and weight, respiratory and cardiac status, pain level and activity tolerance, hydration, nutrition and bowel status and home safety.  RN to teach medication management.  RN to provide teaching and assist with management of new home TPN.    _______________________________  [facility]  Phone  [phone]  Fax  [phone]  ______________________________             Your next 10 appointments already scheduled     Feb 15, 2018 11:30 AM CST   [facility] Draw with  [facility]   [facility] ([facility])    [address] [place] [address]  [address] [place] [id number] [phone]            Feb 15, 2018 12:00 PM CST   (Arrive by 11:45 AM)   Return Visit with Albert Long MD   [facility] ([facility])    [address] [place] [address]  [address] [place] [id number] [phone]            Feb 21, 2018 11:00 AM CST   (Arrive by 10:45 AM)   New Patient Visit with Karime Coronel RD   [facility] ([facility])    [address] [place] [address]  [address] [place] [id number] [phone]            Feb 21, 2018 12:30 PM CST    [facility]   [facility] Draw ([facility])    [address]  [address]  [facility] [address]   [phone]            Feb 21, 2018  1:00 PM CST   Infusion 240 with  ONCOLOGY INFUSION, UC 10 ATC   [facility] ([facility])    [address]  [address]  [facility] [address]   [phone]            Mar 01, 2018  4:15 PM CST   (Arrive by 4:00 PM)   Return Visit with Sukhdeep Mota MD   [facility] ([facility])    [address]  [address]  [facility] [address]   [phone]              Follow-Up Appointment Instructions     Future Labs/Procedures    Follow Up and recommended labs and tests     Comments:    Follow up in clinic as scheduled.      Follow-Up Appointment Instructions     Follow Up and recommended labs and tests       Follow up in clinic as scheduled.             Statement of Approval     Ordered          02/09/18 1120  I have reviewed and agree with all the recommendations and orders detailed in this document.  EFFECTIVE NOW     Approved and electronically signed by:  Es Dumont APRN CNP

## 2018-02-05 NOTE — PROGRESS NOTES
"CLINICAL NUTRITION SERVICES - ASSESSMENT NOTE     Nutrition Prescription    RECOMMENDATIONS FOR MDs/PROVIDERS TO ORDER:  None at this time     Malnutrition Status:    Severe malnutrition in the context of acute on chronic illness    Recommendations already ordered by Registered Dietitian (RD):  Ensure clear between meals    Future/Additional Recommendations:  1. Oral intake, need for calorie counts and nutrition support   2. EN: recommend Peptamen 1.5 @ goal 50 ml/hr (1200 ml/day) to provide 1800 kcals (28 kcal/kg/day), 82 g PRO (1.3 g/kg/day), 924 ml free H2O, 67 g Fat (70% from MCTs), 226 g CHO and no Fiber daily.  -start at 10 ml/hr for 12 hrs and increase 10 ml q 12 hrs until goal rate  - Certavite 15 ml daily  - 60 ml H2O flush q 4 hrs for tube patency  3. CPN: 1560 ml: Dextrose 240 g (816 kcals), AA 90 g (360 kcals), 20% lipdis 250 ml to provide 1676 kcals (26 kcals/kg), 1.4 g/kg PRO, 30% cals from fat, GIR 2.56  - start dextrose at 120 g and increase 40-50 g daily if K+, Mg++ WNL and phos is > 2.0 mg/dL      REASON FOR ASSESSMENT  Sebas Lopez is a/an 52 year old male assessed by the dietitian for Provider Order - consult    NUTRITION HISTORY  Per patient his usual body weight is 187 lb which he weighed around Labor Day. He reports he has not been able to eat/drink very much recently. He reports mostly been drinking ensure clear, water. He reports his girlfriend helps him at home. +nausea with eating and drinking    CURRENT NUTRITION ORDERS  Diet: Regular  Intake/Tolerance: < 50% of TID meals     LABS  Labs reviewed    MEDICATIONS  Medications reviewed    ANTHROPOMETRICS  Height: 175.3 cm (5' 9\")  Most Recent Weight: 65.3 kg (143 lb 14.4 oz)    IBW: 71.6 kg  BMI: Normal BMI  Weight History:   Wt Readings from Last 10 Encounters:   02/05/18 65.3 kg (143 lb 14.4 oz)   02/05/18 66.5 kg (146 lb 11.2 oz)   02/01/18 64.9 kg (143 lb)   01/31/18 65.3 kg (143 lb 14.4 oz)   01/29/18 64.5 kg (142 lb 3.2 oz) "   01/26/18 66.8 kg (147 lb 4.3 oz)   01/25/18 67.1 kg (148 lb)   12/21/17 65.9 kg (145 lb 5 oz)   12/14/17 65.8 kg (145 lb 1.6 oz)   12/06/17 69.9 kg (154 lb 1.6 oz)   wt 68.3 kg (11/20/17) this indicates a 4.4% wt loss. 23% wt loss in ~ 5-6 months  Dosing Weight: 65 kg (admit wt)    ASSESSED NUTRITION NEEDS  Estimated Energy Needs: 3936-8518 kcals/day (25 - 30 kcals/kg)  Justification: Maintenance  Estimated Protein Needs: 78-98 grams protein/day (1.2 - 1.5 grams of pro/kg)  Justification: Repletion  Estimated Fluid Needs: 30 - 35+ mL/kg  Justification: Increased needs - ileostomy    PHYSICAL FINDINGS  See malnutrition section below.    MALNUTRITION  % Intake: </=50% for >/= 1 month (severe)  % Weight Loss: > 10% in 6 months (severe)  Subcutaneous Fat Loss: Facial region:  Mild/moderate  Muscle Loss: Facial & jaw region:  Mild/moderate and Thoracic region (clavicle, acromium bone, deltoid, trapezius, pectoral):  moderate  Fluid Accumulation/Edema: None noted  Malnutrition Diagnosis: Severe malnutrition in the context of acute on chronic illness    NUTRITION DIAGNOSIS  Inadequate oral intake related to nausea, decreased apeptite as evidenced by minimal oral intake, recent wt loss    INTERVENTIONS  Implementation  1. Nutrition Education: Provided education on high calorie/high PRO. Handout provided on high calorie/high protein recipes   2. Medical food supplement therapy - ensure clear between meals   - Each Ensure Clear supplement provides 200 kcals, 7 gms PRO, 43 gms CHO, and 0 g fat.     Goals  Patient to consume % of nutritionally adequate meal trays TID, or the equivalent with supplements/snacks.     Monitoring/Evaluation  Progress toward goals will be monitored and evaluated per protocol.    Mattie Pyle MS/RD/LD/CNSC  6A/7D RD Pager: [phone]

## 2018-02-05 NOTE — IP AVS SNAPSHOT
[address] [address]    Phone:  [phone]                                       After Visit Summary   2/5/2018    Sebas Lopez    MRN: [id number]           After Visit Summary Signature Page     I have received my discharge instructions, and my questions have been answered. I have discussed any challenges I see with this plan with the nurse or doctor.    ..........................................................................................................................................  Patient/Patient Representative Signature      ..........................................................................................................................................  Patient Representative Print Name and Relationship to Patient    ..................................................               ................................................  Date                                            Time    ..........................................................................................................................................  Reviewed by Signature/Title    ...................................................              ..............................................  Date                                                            Time

## 2018-02-05 NOTE — MR AVS SNAPSHOT
After Visit Summary   2/5/2018    Sebas Lopez    MRN: [id number]           Patient Information     Date Of Birth          1965        Visit Information        Provider Department      2/5/2018 9:40 AM Jacque Dale PA [facility]        Today's Diagnoses     Peritoneal carcinomatosis (H)        Cancer of sigmoid colon (H)           Follow-ups after your visit        Your next 10 appointments already scheduled     Feb 07, 2018  1:30 PM CST   Infusion 240 with UC ONCOLOGY INFUSION, UC 32 ATC   [facility] ([facility])    [address] [place] [address]  [address]  [facility] [address]   [phone]            Feb 21, 2018 11:00 AM CST   (Arrive by 10:45 AM)   New Patient Visit with Karime Coronel RD   [facility] ([facility])    [address] [place] [address]  [address]  [facility] [address]   [phone]            Feb 21, 2018 12:30 PM CST   [facility] Draw with  [facility] DRAW   [facility] ([facility])    [address] [place] [address]  [address]  [facility] [address]   [phone]            Feb 21, 2018  1:00 PM CST   Infusion 240 with UC ONCOLOGY INFUSION, UC 10 ATC   [facility] ([facility])    [address] [place] [address]  [address]  [facility] [address]   [phone]            Mar 01, 2018  4:15 PM CST   (Arrive by 4:00 PM)   Return Visit with Sukhdeep Mota MD   [facility] ([facility])    [address] [place] [address]  [address]  [facility] [address]   [phone]              Future tests that were ordered for you today     Open Standing Orders        Priority Remaining Interval Expires Ordered    Phosphorus Routine 100/100 CONDITIONAL (SPECIFY)  2/5/2018    Potassium Routine 100/100 CONDITIONAL (SPECIFY)  2/5/2018    Magnesium Routine 100/100  CONDITIONAL (SPECIFY)  2/5/2018    Oxygen: Nasal cannula Routine 26438/69443 CONTINUOUS  2/5/2018    Blood culture Routine 100/100 CONDITIONAL (SPECIFY)  2/5/2018    Blood culture Routine 100/100 CONDITIONAL (SPECIFY)  2/5/2018    CBC with platelets differential Routine 1/1 AM DRAW  2/5/2018    Comprehensive metabolic panel Routine 1/1 AM DRAW  2/5/2018    Magnesium Routine 1/1 AM DRAW  2/5/2018    Phosphorus Routine 1/1 AM DRAW  2/5/2018          Open Future Orders        Priority Expected Expires Ordered    CT Abdomen pelvis w contrast* STAT  2/5/2019 2/5/2018            Who to contact     If you have questions or need follow up information about today's clinic visit or your schedule please contact [facility] directly at [phone].  Normal or non-critical lab and imaging results will be communicated to you by [email or web address], letter or phone within 4 business days after the clinic has received the results. If you do not hear from us within 7 days, please contact the clinic through [employer] or phone. If you have a critical or abnormal lab result, we will notify you by phone as soon as possible.  Submit refill requests through [employer] or call your pharmacy and they will forward the refill request to us. Please allow 3 business days for your refill to be completed.          Additional Information About Your Visit        [employer] Information     [employer] gives you secure access to your electronic health record. If you see a primary care provider, you can also send messages to your care team and make appointments. If you have questions, please call your primary care clinic.  If you do not have a primary care provider, please call [phone] and they will assist you.        Care EveryWhere ID     This is your Care EveryWhere ID. This could be used by other organizations to access your [place] medical records  [id number]        Your Vitals Were     Pulse Temperature Respirations Height Pulse Oximetry BMI  "(Body Mass Index)    114 96  F (35.6  C) (Oral) 16 1.753 m (5' 9\") 98% 21.66 kg/m2       Blood Pressure from Last 3 Encounters:   02/05/18 107/73   02/05/18 124/86   02/01/18 112/82    Weight from Last 3 Encounters:   02/05/18 65.3 kg (143 lb 14.4 oz)   02/05/18 66.5 kg (146 lb 11.2 oz)   02/01/18 64.9 kg (143 lb)              We Performed the Following     *CBC with platelets differential     Comprehensive metabolic panel     Magnesium     Phosphorus        Primary Care Provider Office Phone # Fax #    Jaguar Becker [phone]       [place] [address] [address] [facility] [address]        Equal Access to Services     SARAH DUNN : Hadii aad ku hadasho Soomaali, waaxda luqadaha, qaybta kaalmada adeegyada, bhargav sage . So [facility] [phone].    ATENCIÓN: Si habla español, tiene a cid disposición servicios gratuitos de asistencia lingüística. Llame al [phone].    We comply with applicable federal civil rights laws and [place] laws. We do not discriminate on the basis of race, color, national origin, age, disability, sex, sexual orientation, or gender identity.            Thank you!     Thank you for choosing [facility]  for your care. Our goal is always to provide you with excellent care. Hearing back from our patients is one way we can continue to improve our services. Please take a few minutes to complete the written survey that you may receive in the mail after your visit with us. Thank you!             Your Updated Medication List - Protect others around you: Learn how to safely use, store and throw away your medicines at www.disposemymeds.org.          This list is accurate as of 2/5/18 11:00 AM.  Always use your most recent med list.                   Brand Name Dispense Instructions for use Diagnosis    amylase-lipase-protease 58259 UNITS Cpep    CREON    240 capsule    Take 2 capsules (72,000 Units) by mouth 3 times daily (with " meals) And 1 capsule with each snack tid.    Diarrhea, unspecified type, Peritoneal carcinomatosis (H), Cancer of sigmoid colon (H)       diphenoxylate-atropine 2.5-0.025 MG per tablet    LOMOTIL    240 tablet    Take 1-2 tablets by mouth 4 times daily as needed for diarrhea    Diarrhea due to drug       IRON SUPPLEMENT PO      Take 325 mg by mouth 2 times daily (with meals)        loperamide 2 MG capsule    IMODIUM    240 capsule    Take 2 capsules (4 mg) by mouth 4 times daily as needed for diarrhea    Diarrhea, unspecified type       LORazepam 0.5 MG tablet    ATIVAN    30 tablet    Take 1 tablet (0.5 mg) by mouth every 4 hours as needed (Anxiety, Nausea/Vomiting or Sleep)    Peritoneal carcinomatosis (H), Cancer of sigmoid colon (H)       magnesium oxide 400 MG tablet    MAG-OX    60 tablet    Take 1 tablet (400 mg) by mouth 2 times daily    Hypomagnesemia       octreotide 50 MCG/ML Soln injection    sandoSTATIN    90 mL    Inject 1 mL (50 mcg) Subcutaneous 3 times daily    Diarrhea, unspecified type       omeprazole 20 MG CR capsule    priLOSEC    30 capsule    Take 1 capsule (20 mg) by mouth daily    Diarrhea, unspecified type       ondansetron 8 MG ODT tab    ZOFRAN-ODT    30 tablet    Take 1 tablet (8 mg) by mouth every 8 hours as needed for nausea    Nausea       opium tincture tincture     72 mL    Take 0.6 mLs (6 mg) by mouth 4 times daily    Diarrhea, unspecified type, Cancer of sigmoid colon (H), Peritoneal carcinomatosis (H)       * oxyCODONE 5 MG capsule    OXY-IR    60 capsule    Take 1-2 capsules (5-10 mg) by mouth every 4 hours as needed for moderate to severe pain    Peritoneal carcinomatosis (H), Cancer of sigmoid colon (H)       * oxyCODONE 10 MG 12 hr tablet    OXYCONTIN    60 tablet    Take 1 tablet (10 mg) by mouth every 12 hours    Peritoneal carcinomatosis (H), Cancer of sigmoid colon (H)       Potassium Chloride 40 MEQ/15ML (20%) Soln     450 mL    Take 15 mLs (40 mEq) by mouth daily     Hypokalemia       Psyllium 51.7 % Pack    METAMUCIL FIBER    90 each    Take 1 packet by mouth 3 times daily    Diarrhea, unspecified type       * Notice:  This list has 2 medication(s) that are the same as other medications prescribed for you. Read the directions carefully, and ask your doctor or other care provider to review them with you.

## 2018-02-05 NOTE — NURSING NOTE
"Oncology Rooming Note    February 5, 2018 9:51 AM   Sebas Lopez is a 52 year old male who presents for:    Chief Complaint   Patient presents with     Port Draw     accessed with power needle heparin locked, vitals checked     Oncology Clinic Visit     Follow up-Colon CA     Initial Vitals: /86 (BP Location: Right arm, Patient Position: Sitting, Cuff Size: Adult Regular)  Pulse 114  Temp 96  F (35.6  C) (Oral)  Resp 16  Ht 1.753 m (5' 9\")  Wt 66.5 kg (146 lb 11.2 oz)  SpO2 98%  BMI 21.66 kg/m2 Estimated body mass index is 21.66 kg/(m^2) as calculated from the following:    Height as of this encounter: 1.753 m (5' 9\").    Weight as of this encounter: 66.5 kg (146 lb 11.2 oz). Body surface area is 1.8 meters squared.  No Pain (0) Comment: Data Unavailable   No LMP for male patient.  Allergies reviewed: Yes  Medications reviewed: Yes    Medications: MEDICATION REFILLS NEEDED TODAY. Provider was notified.  Pharmacy name entered into [facility]: [facility] - [place] - [place], [place] - [address] [place] [address]    Clinical concerns: Refill on Oxycodone Jacque Dale was notified.    10 minutes for nursing intake (face to face time)     Tracie Caro LPN              "

## 2018-02-05 NOTE — NURSING NOTE
Chief Complaint   Patient presents with     Port Draw     accessed with power needle heparin locked, vitals checked

## 2018-02-05 NOTE — IP AVS SNAPSHOT
MRN:[id number]                      After Visit Summary   2/5/2018    Sebas Lopez    MRN: [id number]           Thank you!     Thank you for choosing [facility] for your care. Our goal is always to provide you with excellent care. Hearing back from our patients is one way we can continue to improve our services. Please take a few minutes to complete the written survey that you may receive in the mail after you visit with us. Thank you!        Patient Information     Date Of Birth          1965        Designated Caregiver       Most Recent Value    Caregiver    Will someone help with your care after discharge? yes    Name of designated caregiver Roxie     Phone number of caregiver     Caregiver address [address] [place] [address]      About your hospital stay     You were admitted on:  February 5, 2018 You last received care in the:  [address] [facility]    You were discharged on:  February 9, 2018        Reason for your hospital stay       You were admitted with abdominal pain, nausea/vomiting. A CT scan showed colonic blockage, so a stent was placed.                  Who to Call     For medical emergencies, please call 911.  For non-urgent questions about your medical care, please call your primary care provider or clinic, [phone]  For questions related to your surgery, please call your surgery clinic        Attending Provider     Provider Specialty    Tammy Palacio MD Hematology & Oncology       Primary Care Provider Office Phone # Fax #    Jaguar Becker [phone]       When to contact your care team       [facility] triage line at [phone] for temp >100.4, uncontrolled nausea/vomiting/diarrhea/constipation, unrelieved pain, bleeding not relieved with pressure, dizziness, chest pain, shortness of breath, loss of consciousness, and any new or concerning symptoms.                  After Care Instructions      Activity       Your activity upon discharge: as tolerated.            Diet       Follow this diet upon discharge: TPN, advance diet as tolerated.            Discharge Instructions       It is important that you take you anti nausea medication (zofran) every 8 hours scheduled to help control nausea.  If you feel that you are becoming constipated, let your provider know, as this can be a side effect of zofran.      Advance you diet slowly -- start with low residue / low fat foods, and work your way back to regular diet.                  Follow-up Appointments     Follow Up and recommended labs and tests       Follow up in clinic as scheduled.                  Your next 10 appointments already scheduled     Feb 15, 2018 11:30 AM CST   [facility] with  [facility]   [facility]    [address] [place] [address]  [address]  [facility] [address]   [phone]            Feb 15, 2018 12:00 PM CST   (Arrive by 11:45 AM)   Return Visit with Albert Long MD   [facility] ([facility])    [address] [place] [address]  [address]  [facility] [address]   [phone]            Feb 21, 2018 11:00 AM CST   (Arrive by 10:45 AM)   New Patient Visit with Karime Coronel RD   [facility] ([facility])    [address] [place] [address]  [address]  [facility] [address]   [phone]            Feb 21, 2018 12:30 PM CST   [facility] Draw with [facility]   [facility] ([facility])    [address] [place] [address]  [address]  [facility] [address]   [phone]            Feb 21, 2018  1:00 PM CST   Infusion 240 with [facility]ION, UC 10 ATC   [facility] ([facility])    [address] [place] [address]  [address]  [facility] [address]   [phone]            Mar 01, 2018  4:15 PM CST   (Arrive by  "4:00 PM)   Return Visit with Sukhdeep Mota MD   [facility] ([facility])    [address]  [address]  [facility] [address] [phone]              Additional Services     Home infusion referral       Home TPN per physician orders.    -------------------------  CVC Line       Port A Cath Single 08/10/17 Right Chest wall    ------------------------    The following homecare is recommended:  RN skilled nursing visit. RN to assess vital signs and weight, respiratory and cardiac status, pain level and activity tolerance, hydration, nutrition and bowel status and home safety.  RN to teach medication management.  RN to provide teaching and assist with management of new home TPN.    _______________________________  [facility]  Phone  [phone]  Fax  [phone]  ______________________________                  Pending Results     Date and Time Order Name Status Description    2/5/2018 1439 Blood culture Preliminary     2/5/2018 1439 Blood culture Preliminary             Statement of Approval     Ordered          02/09/18 1120  I have reviewed and agree with all the recommendations and orders detailed in this document.  EFFECTIVE NOW     Approved and electronically signed by:  Es Dumont APRN CNP             Admission Information     Date & Time Provider Department Dept. Phone    2/5/2018 Tammy Palacio MD [address] [facility] [place] [phone]      Your Vitals Were     Blood Pressure Pulse Temperature Respirations Height Weight    96/62 (BP Location: Left arm) 75 97.2  F (36.2  C) (Oral) 15 1.753 m (5' 9\") 64.1 kg (141 lb 4.8 oz)    Pulse Oximetry BMI (Body Mass Index)                97% 20.87 kg/m2          MyChart Information     [employer] gives you secure access to your electronic health record. If you see a primary care provider, you can also send messages to your care team and make appointments. If you have questions, please call " your primary care clinic.  If you do not have a primary care provider, please call [phone] and they will assist you.        Care EveryWhere ID     This is your Care EveryWhere ID. This could be used by other organizations to access your [facility] medical records  [id number]        Equal Access to Services     SARAH DUNN : Hadii aad ku hadbrandonevens Soyolandaali, waaxda luqadaha, qaybta kaalmada trudyjosianerita, bhargav blancozullyvijay willis. So [facility] [phone].    ATENCIÓN: Si habla español, tiene a cid disposición servicios gratuitos de asistencia lingüística. Kenan al [phone].    We comply with applicable federal civil rights laws and [place] laws. We do not discriminate on the basis of race, color, national origin, age, disability, sex, sexual orientation, or gender identity.               Review of your medicines      CONTINUE these medicines which may have CHANGED, or have new prescriptions. If we are uncertain of the size of tablets/capsules you have at home, strength may be listed as something that might have changed.        Dose / Directions    ondansetron 8 MG ODT tab   Commonly known as:  ZOFRAN-ODT   This may have changed:    - when to take this  - reasons to take this   Used for:  Nausea        Dose:  8 mg   Take 1 tablet (8 mg) by mouth every 8 hours   Quantity:  30 tablet   Refills:  1         CONTINUE these medicines which have NOT CHANGED        Dose / Directions    amylase-lipase-protease 45821 UNITS Cpep   Commonly known as:  CREON   Used for:  Diarrhea, unspecified type, Peritoneal carcinomatosis (H), Cancer of sigmoid colon (H)        Dose:  2 capsule   Take 2 capsules (72,000 Units) by mouth 3 times daily (with meals) And 1 capsule with each snack tid.   Quantity:  240 capsule   Refills:  3       diphenoxylate-atropine 2.5-0.025 MG per tablet   Commonly known as:  LOMOTIL   Used for:  Diarrhea due to drug        Dose:  1-2 tablet   Take 1-2 tablets by mouth 4 times daily as needed  for diarrhea   Quantity:  240 tablet   Refills:  5       IRON SUPPLEMENT PO        Dose:  325 mg   Take 325 mg by mouth 2 times daily (with meals)   Refills:  0       loperamide 2 MG capsule   Commonly known as:  IMODIUM   Used for:  Diarrhea, unspecified type        Dose:  4 mg   Take 2 capsules (4 mg) by mouth 4 times daily as needed for diarrhea   Quantity:  240 capsule   Refills:  11       LORazepam 0.5 MG tablet   Commonly known as:  ATIVAN   Used for:  Peritoneal carcinomatosis (H), Cancer of sigmoid colon (H)        Dose:  0.5 mg   Take 1 tablet (0.5 mg) by mouth every 4 hours as needed (Anxiety, Nausea/Vomiting or Sleep)   Quantity:  30 tablet   Refills:  5       omeprazole 20 MG CR capsule   Commonly known as:  priLOSEC   Used for:  Diarrhea, unspecified type        Dose:  20 mg   Take 1 capsule (20 mg) by mouth daily   Quantity:  30 capsule   Refills:  11       opium tincture tincture   Used for:  Diarrhea, unspecified type, Cancer of sigmoid colon (H), Peritoneal carcinomatosis (H)        Dose:  6 mg   Take 0.6 mLs (6 mg) by mouth 4 times daily   Quantity:  72 mL   Refills:  0       * oxyCODONE 10 MG 12 hr tablet   Commonly known as:  OXYCONTIN   Used for:  Peritoneal carcinomatosis (H), Cancer of sigmoid colon (H)        Dose:  10 mg   Take 1 tablet (10 mg) by mouth every 12 hours   Quantity:  60 tablet   Refills:  0       * oxyCODONE 5 MG capsule   Commonly known as:  OXY-IR   Used for:  Peritoneal carcinomatosis (H), Cancer of sigmoid colon (H)        Dose:  5-10 mg   Take 1-2 capsules (5-10 mg) by mouth every 4 hours as needed for moderate to severe pain   Quantity:  60 capsule   Refills:  0       Psyllium 51.7 % Pack   Commonly known as:  METAMUCIL FIBER   Used for:  Diarrhea, unspecified type        Dose:  1 packet   Take 1 packet by mouth 3 times daily   Quantity:  90 each   Refills:  3       * Notice:  This list has 2 medication(s) that are the same as other medications prescribed for you. Read  the directions carefully, and ask your doctor or other care provider to review them with you.      STOP taking     magnesium oxide 400 MG tablet   Commonly known as:  MAG-OX           octreotide 50 MCG/ML Soln injection   Commonly known as:  sandoSTATIN           Potassium Chloride 40 MEQ/15ML (20%) Soln                Where to get your medicines      Some of these will need a paper prescription and others can be bought over the counter. Ask your nurse if you have questions.     Bring a paper prescription for each of these medications     oxyCODONE 5 MG capsule                Protect others around you: Learn how to safely use, store and throw away your medicines at www.disposemymeds.org.        Information about OPIOIDS     PRESCRIPTION OPIOIDS: WHAT YOU NEED TO KNOW    Prescription opioids can be used to help relieve moderate to severe pain and are often prescribed following a surgery or injury, or for certain health conditions. These medications can be an important part of treatment but also come with serious risks. It is important to work with your health care provider to make sure you are getting the safest, most effective care.    WHAT ARE THE RISKS AND SIDE EFFECTS OF OPIOID USE?  Prescription opioids carry serious risks of addiction and overdose, especially with prolonged use. An opioid overdose, often marked by slowed breathing can cause sudden death. The use of prescription opioids can have a number of side effects as well, even when taken as directed:      Tolerance - meaning you might need to take more of a medication for the same pain relief    Physical dependence - meaning you have symptoms of withdrawal when a medication is stopped    Increased sensitivity to pain    Constipation    Nausea, vomiting, and dry mouth    Sleepiness and dizziness    Confusion    Depression    Low levels of testosterone that can result in lower sex drive, energy, and strength    Itching and sweating    RISKS ARE GREATER  WITH:    History of drug misuse, substance use disorder, or overdose    Mental health conditions (such as depression or anxiety)    Sleep apnea    Older age (65 years or older)    Pregnancy    Avoid alcohol while taking prescription opioids.   Also, unless specifically advised by your health care provider, medications to avoid include:    Benzodiazepines (such as Xanax or Valium)    Muscle relaxants (such as Soma or Flexeril)    Hypnotics (such as Ambien or Lunesta)    Other prescription opioids    KNOW YOUR OPTIONS:  Talk to your health care provider about ways to manage your pain that do not involve prescription opioids. Some of these options may actually work better and have fewer risks and side effects:    Pain relievers such as acetaminophen, ibuprofen, and naproxen    Some medications that are also used for depression or seizures    Physical therapy and exercise    Cognitive behavioral therapy, a psychological, goal-directed approach, in which patients learn how to modify physical, behavioral, and emotional triggers of pain and stress    IF YOU ARE PRESCRIBED OPIOIDS FOR PAIN:    Never take opioids in greater amounts or more often than prescribed    Follow up with your primary health care provider and work together to create a plan on how to manage your pain.    Talk about ways to help manage your pain that do not involve prescription opioids    Talk about all concerns and side effects    Help prevent misuse and abuse    Never sell or share prescription opioids    Never use another person's prescription opioids    Store prescription opioids in a secure place and out of reach of others (this may include visitors, children, friends, and family)    Visit www.cdc.gov/drugoverdose to learn about risks of opioid abuse and overdose    If you believe you may be struggling with addiction, tell your health care provider and ask for guidance or call [facility]'s National Helpline at 6-103-779-HELP    LEARN MORE /  www.cdc.gov/drugoverdose/prescribing/guideline.html    Safely dispose of unused prescription opioids: Find your local drug take-back programs and more information about the importance of safe disposal at www.doseofreality.[place].gov             Medication List: This is a list of all your medications and when to take them. Check marks below indicate your daily home schedule. Keep this list as a reference.      Medications           Morning Afternoon Evening Bedtime As Needed    amylase-lipase-protease 04734 UNITS Cpep   Commonly known as:  CREON   Take 2 capsules (72,000 Units) by mouth 3 times daily (with meals) And 1 capsule with each snack tid.                                diphenoxylate-atropine 2.5-0.025 MG per tablet   Commonly known as:  LOMOTIL   Take 1-2 tablets by mouth 4 times daily as needed for diarrhea                                   IRON SUPPLEMENT PO   Take 325 mg by mouth 2 times daily (with meals)                                      loperamide 2 MG capsule   Commonly known as:  IMODIUM   Take 2 capsules (4 mg) by mouth 4 times daily as needed for diarrhea                                   LORazepam 0.5 MG tablet   Commonly known as:  ATIVAN   Take 1 tablet (0.5 mg) by mouth every 4 hours as needed (Anxiety, Nausea/Vomiting or Sleep)                                   omeprazole 20 MG CR capsule   Commonly known as:  priLOSEC   Take 1 capsule (20 mg) by mouth daily   Last time this was given:  20 mg on 2/9/2018  8:48 AM                                   ondansetron 8 MG ODT tab   Commonly known as:  ZOFRAN-ODT   Take 1 tablet (8 mg) by mouth every 8 hours   Last time this was given:  8 mg on 2/9/2018  4:54 AM                                         opium tincture tincture   Take 0.6 mLs (6 mg) by mouth 4 times daily                                * oxyCODONE 10 MG 12 hr tablet   Commonly known as:  OXYCONTIN   Take 1 tablet (10 mg) by mouth every 12 hours   Last time this was given:  10 mg on 2/9/2018  10:34 AM                                      * oxyCODONE 5 MG capsule   Commonly known as:  OXY-IR   Take 1-2 capsules (5-10 mg) by mouth every 4 hours as needed for moderate to severe pain                                   Psyllium 51.7 % Pack   Commonly known as:  METAMUCIL FIBER   Take 1 packet by mouth 3 times daily                                * Notice:  This list has 2 medication(s) that are the same as other medications prescribed for you. Read the directions carefully, and ask your doctor or other care provider to review them with you.

## 2018-02-05 NOTE — PROGRESS NOTES
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 Imaging performed at that time showing sigmoid wall thickening with adjacent mesenteric lymphadenopathy with free fluid near the abdominal wall mess from prior hernia surgery. He underwent a colonoscopy that showed an obstructing sigmoid colon mass. Biopsy of the mass showing sigmoid adenocarcinoma. He then developed obstructive symptoms and underwent an exploratory laparotomy on 7/10/2017. During that procedure, he was found to have diffuse peritoneal carcinomatosis. Extensive lysis of adhesions was performed and a small bowel resection was performed with end ileostomy. The biopsies from multiple large peritoneal metastases were also positive for metastatic adenocarcinoma. He started FOLFOX (5-FU, oxaliplatin, leucovorin) on 8/11/17. He completed 6 cycles. He then underwent diagnostic laparoscopy and ex-lap on 12/6/17 with Dr. Dudley. HIPEC aborted due to diffuse carcinomatosis. He then presented to ED on 1/26 with abdominal pain and associated nausea. CT A/P on 1/26 with 5.2 x 4.5 x 3.6 cm proximal sigmoid mass causnig colonic obstriction with singificant distention of cecum (7cm in diameter), ascending colon and proximal transverse colon. No pneumatosis or pneumoperitoneum. Also small volume of ascites with associated findings concerning for peritoneal carcinomatosis, increased from prior studies. Dr. Dudley was consulted and recommended no surgical intervention, but possible cecotomy per IR if possible. Sebas was discharged home with trial of dilaudid prn. Octreotide held. He received IVF support, Zofran on 1/29.    Interval History:  Sebas returns today with his parents. He has a record with him kept by his girlfriend. Per  report, he had a lot of abdominal cramping on Saturday which has improved since then. He is on oxycontin BID, and taking 10 mg of oxycodone q4h prn on a regular basis. He denies any nausea at the moment, and he is not in too much pain right now. Denies any vomiting. It is unclear whether he has been taking the scheduled Zofran and Sebas is unsure, but thinks he has been. It appears he is taking ativan every 4-6 hours. He has had about 2-4 10oz clear Ensure since Saturday, and some additional beverages, although not recorded total amount. He is unsure how much his ostomy has been putting out daily and this is not recorded on the sheet he brought in with him. Per parents and girlfriend's report, Sebas has been a little confused at times, resting most of the day and requiring assistance to get out of bed, go to bathroom, etc. He denies any fever, chills. He denies dysuria, but he states urine may be smaller in volume and darker. He denies cough, SOB, chest pain. He has been feeling a little lightheaded whenever he is out of bed and overall weaker since last week, but denies any syncope or falls.    Current Outpatient Prescriptions   Medication Sig Dispense Refill     oxyCODONE (OXYCONTIN) 10 MG 12 hr tablet Take 1 tablet (10 mg) by mouth every 12 hours 60 tablet 0     ondansetron (ZOFRAN-ODT) 8 MG ODT tab Take 1 tablet (8 mg) by mouth every 8 hours as needed for nausea 30 tablet 1     oxyCODONE (OXY-IR) 5 MG capsule Take 1-2 capsules (5-10 mg) by mouth every 4 hours as needed for moderate to severe pain 60 capsule 0     omeprazole (PRILOSEC) 20 MG CR capsule Take 1 capsule (20 mg) by mouth daily (Patient not taking: Reported on 2/1/2018) 30 capsule 11     loperamide (IMODIUM) 2 MG capsule Take 2 capsules (4 mg) by mouth 4 times daily as needed for diarrhea (Patient not taking: Reported on 2/1/2018) 240 capsule 11     opium tincture tincture Take 0.6 mLs (6 mg) by mouth 4 times daily (Patient not taking: Reported on  "2/1/2018) 72 mL 0     octreotide (SANDOSTATIN) 50 MCG/ML SOLN injection Inject 1 mL (50 mcg) Subcutaneous 3 times daily (Patient not taking: Reported on 2/1/2018) 90 mL 2     diphenoxylate-atropine (LOMOTIL) 2.5-0.025 MG per tablet Take 1-2 tablets by mouth 4 times daily as needed for diarrhea (Patient not taking: Reported on 1/31/2018) 240 tablet 5     amylase-lipase-protease (CREON) 96378 UNITS CPEP Take 2 capsules (72,000 Units) by mouth 3 times daily (with meals) And 1 capsule with each snack tid. (Patient not taking: Reported on 1/31/2018) 240 capsule 3     LORazepam (ATIVAN) 0.5 MG tablet Take 1 tablet (0.5 mg) by mouth every 4 hours as needed (Anxiety, Nausea/Vomiting or Sleep) (Patient not taking: Reported on 2/1/2018) 30 tablet 5     magnesium oxide (MAG-OX) 400 MG tablet Take 1 tablet (400 mg) by mouth 2 times daily (Patient not taking: Reported on 2/1/2018) 60 tablet 1     Potassium Chloride 40 MEQ/15ML (20%) SOLN Take 15 mLs (40 mEq) by mouth daily (Patient not taking: Reported on 2/1/2018) 450 mL 3     Psyllium (METAMUCIL FIBER) 51.7 % PACK Take 1 packet by mouth 3 times daily (Patient not taking: Reported on 2/1/2018) 90 each 3     Ferrous Sulfate (IRON SUPPLEMENT PO) Take 325 mg by mouth 2 times daily (with meals)          Physical Examination:  General: Appears more fatigued today, mildly cachexic male in no acute distress.  /86 (BP Location: Right arm, Patient Position: Sitting, Cuff Size: Adult Regular)  Pulse 114  Temp 96  F (35.6  C) (Oral)  Resp 16  Ht 1.753 m (5' 9\")  Wt 66.5 kg (146 lb 11.2 oz)  SpO2 98%  BMI 21.66 kg/m2  Wt Readings from Last 10 Encounters:   02/05/18 66.5 kg (146 lb 11.2 oz)   02/01/18 64.9 kg (143 lb)   01/31/18 65.3 kg (143 lb 14.4 oz)   01/29/18 64.5 kg (142 lb 3.2 oz)   01/26/18 66.8 kg (147 lb 4.3 oz)   01/25/18 67.1 kg (148 lb)   12/21/17 65.9 kg (145 lb 5 oz)   12/14/17 65.8 kg (145 lb 1.6 oz)   12/06/17 69.9 kg (154 lb 1.6 oz)   11/30/17 67.6 kg (149 lb) "     HEENT: EOMI, PERRL. Sclerae are anicteric. Oral mucosa is pink and moist with no lesions or thrush.   Heart: Tachycardic, regular rhythm.   Lungs: Clear to auscultation bilaterally.   Abdomen: Bowel sounds present, soft, more tender on left side of abdomen today. Mild distention. Ostomy bag in place with brown liquid stool.   Extremities: No lower extremity edema noted bilaterally.   Neuro: Cranial nerves II through XII are grossly intact.  Skin: No rashes, petechiae, or bruising noted on exposed skin.    Laboratory Data:  Results for orders placed or performed in visit on 02/05/18 (from the past 24 hour(s))   *CBC with platelets differential   Result Value Ref Range    WBC 36.7 (H) 4.0 - 11.0 10e9/L    RBC Count 3.93 (L) 4.4 - 5.9 10e12/L    Hemoglobin 11.4 (L) 13.3 - 17.7 g/dL    Hematocrit 32.0 (L) 40.0 - 53.0 %    MCV 81 78 - 100 fl    MCH 29.0 26.5 - 33.0 pg    MCHC 35.6 31.5 - 36.5 g/dL    RDW 15.5 (H) 10.0 - 15.0 %    Platelet Count 382 150 - 450 10e9/L    Diff Method Automated Method     % Neutrophils 83.1 %    % Lymphocytes 3.4 %    % Monocytes 7.9 %    % Eosinophils 0.2 %    % Basophils 0.4 %    % Immature Granulocytes 5.0 %    Nucleated RBCs 0 0 /100    Absolute Neutrophil 30.5 (H) 1.6 - 8.3 10e9/L    Absolute Lymphocytes 1.2 0.8 - 5.3 10e9/L    Absolute Monocytes 2.9 (H) 0.0 - 1.3 10e9/L    Absolute Eosinophils 0.1 0.0 - 0.7 10e9/L    Absolute Basophils 0.2 0.0 - 0.2 10e9/L    Abs Immature Granulocytes 1.8 (H) 0 - 0.4 10e9/L    Absolute Nucleated RBC 0.0    Comprehensive metabolic panel   Result Value Ref Range    Sodium 123 (L) 133 - 144 mmol/L    Potassium 3.5 3.4 - 5.3 mmol/L    Chloride 86 (L) 94 - 109 mmol/L    Carbon Dioxide 24 20 - 32 mmol/L    Anion Gap 12 3 - 14 mmol/L    Glucose 167 (H) 70 - 99 mg/dL    Urea Nitrogen 40 (H) 7 - 30 mg/dL    Creatinine 1.57 (H) 0.66 - 1.25 mg/dL    GFR Estimate 46 (L) >60 mL/min/1.7m2    GFR Estimate If Black 56 (L) >60 mL/min/1.7m2    Calcium 8.7 8.5 - 10.1  mg/dL    Bilirubin Total 0.6 0.2 - 1.3 mg/dL    Albumin 2.5 (L) 3.4 - 5.0 g/dL    Protein Total 8.1 6.8 - 8.8 g/dL    Alkaline Phosphatase 213 (H) 40 - 150 U/L    ALT 12 0 - 70 U/L    AST 18 0 - 45 U/L   Magnesium   Result Value Ref Range    Magnesium 2.0 1.6 - 2.3 mg/dL   Phosphorus   Result Value Ref Range    Phosphorus 3.4 2.5 - 4.5 mg/dL         Assessment and Plan: 51 yo male with hx of ulcerative colitis and 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 He presented today for evaluation prior to resuming FOLFOX but noted to have PAULINA, hyponatremia, leukocytosis, abdominal pain. Will admit to hospital for further evaluation.    Leukocytosis: WBC 36.7, ANC 30.5. Tachycardic, but not hypotensive--BP  124/86. Afebrile. Concern for intra-abdominal infection given abdominal pain and recent CT scans with colonic mass and large bowel dilatation. Abdominal exam with tenderness L>R, but no peritoneal signs. Chest CT on 2/1 with no acute infection. May need further work up including UA, Cdiff. Consider BCx also if becomes febrile.    PAULINA: Cr 1.57 today. Baseline Cr ~0.9-1. BUN/Cr ratio elevated. Likely 2/2 dehydration, but need to r/o infection also possible etiology due to elevated WBC.    FEN: Sodium 123, Cl 86. Suspect secondary to nausea and hypovolemia due to insufficient po intake and losses from ostomy output.    Abdominal pain, nausea: Pain control is improved on oxycontin 10mg q12h with oxycodone 10mg q4h prn. Plan had been to schedule Zofran 8mg TID--unclear if he has been taking. It appears he is also taking ativan prn. No reported vomiting, but po intake is still limited to small amount of liquids. Spoke last week with radiology about his CT A/P from 1/26. The proximal sigmoid mass is about the same size as on previous CT. There does not appear to be any signs of obstruction proximal to the ileostomy. Spoke with Dr. Dudley from surgical oncology last week. He thinks Sebas's symptoms are more  likely from ileus secondary to the peritoneal carcinomatosis. He states the sigmoid mass and distention of cecum might be compressing the small bowel, but less likely to be contributing much to his symptoms.   --Plan to start TPN if he is unable to take enough po. Can place venting g tube if needed.    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 S/p ex lap 7/2017 with small bowel resection and end ileostomy. S/p 6 cycles of FOLFOX. Admitted on 12/6/2017 for ex-lap with aborted HIPEC due to diffuse carcinomatosis. Plan to resume FOLFOX with oxaliplatin dose reduced to 70mg/m2 due to neuropathy. Holding off of Avastin as he may need interventional procedures in the near future.  --Defer chemo now for evaluation of acute issues above.    Jacque Dale PA-C  [facility]  [address], [place] [id number] [phone]

## 2018-02-05 NOTE — LETTER
Transition Communication Hand-off for Care Transitions to Next Level of Care Provider    Name: Sebas Lopez  MRN #: [id number]  Primary Care Provider: NGUYỄN HERNÁNDEZ     Primary Clinic: [facility] [address]  [place] [address]     Reason for Hospitalization:  stage four colon cacner  acute kidney injury  failure to thrive  leukocytosis  Colon cancer (H)  sigmoid carcinoma  Admit Date/Time: 2/5/2018 11:00 AM  Discharge Date: 02/09/18    Colonic stent placed 2/6.  started pt on TPN 2/7,  D/cing with TPN   Follow-up plan:  Future Appointments  Date Time Provider Department Center   2/10/2018 6:00 AM [facility]   2/15/2018 11:30 AM  [facility]   2/15/2018 12:00 PM Albert Long MD [facility]   2/21/2018 11:00 AM Karime Jones RD [facility]   2/21/2018 12:30 PM  [facility]   2/21/2018 1:00 PM [facility] INF[facility]   3/1/2018 4:15 PM Sukhdeep Mota MD [facility]       Any outstanding tests or procedures:        Referrals     Future Labs/Procedures    Home infusion referral     Comments:    Home TPN per physician orders.    -------------------------  CVC Line       Port A Cath Single 08/10/17 Right Chest wall    ------------------------    The following homecare is recommended:  RN skilled nursing visit. RN to assess vital signs and weight, respiratory and cardiac status, pain level and activity tolerance, hydration, nutrition and bowel status and home safety.  RN to teach medication management.  RN to provide teaching and assist with management of new home TPN.    _______________________________  [facility]  Phone  [phone]  Fax  [phone]  ______________________________          ________________________________________  Karina Menon RN, BSN    7D/Oncology Care Coordinator  Pager  [phone]  Phone [phone]

## 2018-02-05 NOTE — H&P
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 admitted with generalized weakness, leukocytosis, hyponatremia, abdominal pain and nausea/vomiting.      #Leukocytosis. WBC 36.7, ANC 30.5 on admission. Tachycardic, but not hypotensive on admission.  /86.  As patient is presenting with abdominal pain, concern for intra abdominal infection, given previous CT scans.  Abdominal exam with generalized tenderness on exam, but no peritoneal signs. Chest CT on 2/1 negative for pulmonary infectious process and patient without respiratory symptoms. Lactate 1.0 on admission.   - will obtain CT A/P   - send c-diff   - UA negative, culture is pending   - will obtain blood cultures      #PAULINA.  #Hyponatremia.  Cr elevated to 1.57 today. Baseline Cr 0.1-1.0.  BUN/Cr ratio elevated. Sodium low at 123 today and patient having difficulty with word finding, feeling \"foggy\" on admission. Likely due to hypovolemia, as patient has not been eating/drinking well due to nausea, ileostomy with high volume liquid output.   - check UA/UC, urine osmo, urine sodium  - will aggressively hydrate IVF @ 150mL/hr     - monitor Na+ q6h     # Abdominal Pain.  # Nausea/vomiting/diarrhea. Ongoing issue.  Not able to eat/drink more than small sips due to nausea/pain.  Per Dr. Long's last note, CT A/p on 1/26 demonstrates stable disease, does not appear to be obstruction proximal to ileostomy. Patient reports he has been trying to take antiemetics scheduled at home, but continues to have nausea and bloating with with PO intake.  Not vomiting. Has ongoing high volume output from ileostomy. No blood in stools. Per clinic notes, there has been consideration of placing venting G tube or starting " patient on TPN outpatient -- may need to consider if pt continues to have nausea with PO intake. Of note, patient does follow with palliative care outpatient.   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  S/p ex lap 7/2017 with small bowel resection and end ileostomy. S/p 6 cycles of FOLFOX. Admitted on 12/6/2017 for ex-lap with aborted HIPEC due to diffuse carcinomatosis. Follows with Dr. Long outpatient. Next generation sequencing--no mutations were identified in analyzed regions of KRAS, NRAS, BRAF, HRAS or PIK3CA. Therefore, this patient may be eligible for anti-EGFR antibody therapy if clinically indicated .  MSI testing with no microsatellite instability. CT A/P on 1/26 with 5.2 x 4.5 x 3.6cm proximal sigmoid mass, causing colonic obstruction with significant distention of cecum (7cm in diameter), ascending colon and proximal transverse colon.  No pneumatosis or pneumoperitoneum . also small volume ascites. No surgical intervention per Dr. Dudley (surg onc)   - will need to f/u with Dr. Long for further treatment plan     # Anemia of chronic disease.   Pt with Hgb of 11.4 on admission. Has previously been on IV/PO iron supplementation, not currently taking. Stable. No evidence of bleeding.   - monitor, transfuse to keep Hgb > 7.0     # Generalized weakness 2/2 malignancy, possible infection, poor nutrition. Pt reports weakness has become progressive over past couple of weeks, has noted it more as he has been not eating or drinking as much.   - PT/OT consult   - nutrition consult     FEN  - regular  "diet as tolerated   - lyte replacement prn   - MIVF @ 150/hr   - nutrition consult as above     Code status: full code     Dispo/follow up:  Pt will need f/u scheduled early next week at discharge for labs/ ELIF visit.  Will discuss with care coordinator.  Anticipate hospitalization of 2-3 days to correct hyponatremia, symptom management.      Above discussed with staff attending, Dr. Palacio.     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 admitted with generalized weakness, leukocytosis, hyponatremia, abdominal pain and nausea/vomiting.      Sebas reports he has had ongoing abdominal pain for quite some time due to his cancer diagnosis. Describes abdominal pain as cramping and constant.  Also has ongoing nausea without vomiting.  Unable to tolerate PO intake due to nausea, and has noted that he has become generally weaker due to poor PO intake.  Feeling \"foggy\" on admission today, difficulty finding words. Sebas is oriented x3 but has difficulty with word finding, is forgetful.  Denies headaches, vision changes, mucositis, chest pain or SOB. No cough or sinus tenderness or congestion. Reports he lives with his significant other, Bessie.  He has been ambulating independently, but has noted that he is significantly weaker than usual.  No pain or burning with urination, notes his urine is darker than usual.  Ostomy always has high volume output, and he does not think this has changed.  Ostomy is putting out liquid stool. No difficulty swallowing.  No numbness/tingling.     Past Medical History    I have reviewed this patient's medical history and updated it with " pertinent information if needed.   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  Past Surgical History:   Procedure Laterality Date     COLONOSCOPY  2017     COLONOSCOPY N/A 11/30/2017    Procedure: COLONOSCOPY;  Colonoscopy;  Surgeon: Rob Dudlye MD;  Location: [facility]     GI SURGERY  07/10/2017    Extensive lysis of adhesions, small bowel resection with end ileostomy.      HERNIA REPAIR       INSERT PORT VASCULAR ACCESS Right 8/10/2017    Procedure: INSERT PORT VASCULAR ACCESS;  Single Lumen Right Chest Power Port;  Surgeon: Malena Andrew PA-C;  Location: [facility]     LAPAROSCOPY DIAGNOSTIC (GENERAL) N/A 12/6/2017    Procedure: LAPAROSCOPY DIAGNOSTIC (GENERAL);  Diagnostic Laparoscopy, Exploratory Laparotomy Anesthesia Block ;  Surgeon: Rob Dudley MD;  Location: [facility]     LAPAROTOMY EXPLORATORY N/A 12/6/2017    Procedure: LAPAROTOMY EXPLORATORY;;  Surgeon: Rob Dudley MD;  Location: [facility]     ORTHOPEDIC SURGERY Left     HIP ARTHROPLASTY       Prior to Admission Medications   Prior to Admission Medications   Prescriptions Last Dose Informant Patient Reported? Taking?   Ferrous Sulfate (IRON SUPPLEMENT PO)   Yes No   Sig: Take 325 mg by mouth 2 times daily (with meals)    LORazepam (ATIVAN) 0.5 MG tablet   No No   Sig: Take 1 tablet (0.5 mg) by mouth every 4 hours as needed (Anxiety, Nausea/Vomiting or Sleep)   Patient not taking: Reported on 2/1/2018   Potassium Chloride 40 MEQ/15ML (20%) SOLN   No No   Sig: Take 15 mLs (40 mEq) by mouth daily    Patient not taking: Reported on 2/1/2018   Psyllium (METAMUCIL FIBER) 51.7 % PACK   No No   Sig: Take 1 packet by mouth 3 times daily   Patient not taking: Reported on 2/1/2018   amylase-lipase-protease (CREON) 57383 UNITS CPEP   No No   Sig: Take 2 capsules (72,000 Units) by mouth 3 times daily (with meals) And 1 capsule with each snack tid.   Patient not taking: Reported on 1/31/2018   diphenoxylate-atropine (LOMOTIL) 2.5-0.025 MG per tablet   No No   Sig: Take 1-2 tablets by mouth 4 times daily as needed for diarrhea   Patient not taking: Reported on 1/31/2018   loperamide (IMODIUM) 2 MG capsule   No No   Sig: Take 2 capsules (4 mg) by mouth 4 times daily as needed for diarrhea   Patient not taking: Reported on 2/1/2018   magnesium oxide (MAG-OX) 400 MG tablet   No No   Sig: Take 1 tablet (400 mg) by mouth 2 times daily   Patient not taking: Reported on 2/1/2018   octreotide (SANDOSTATIN) 50 MCG/ML SOLN injection   No No   Sig: Inject 1 mL (50 mcg) Subcutaneous 3 times daily   Patient not taking: Reported on 2/1/2018   omeprazole (PRILOSEC) 20 MG CR capsule   No No   Sig: Take 1 capsule (20 mg) by mouth daily   Patient not taking: Reported on 2/1/2018   ondansetron (ZOFRAN-ODT) 8 MG ODT tab   No No   Sig: Take 1 tablet (8 mg) by mouth every 8 hours as needed for nausea   opium tincture tincture   No No   Sig: Take 0.6 mLs (6 mg) by mouth 4 times daily   Patient not taking: Reported on 2/1/2018   oxyCODONE (OXY-IR) 5 MG capsule   No No   Sig: Take 1-2 capsules (5-10 mg) by mouth every 4 hours as needed for moderate to severe pain   oxyCODONE (OXYCONTIN) 10 MG 12 hr tablet   No No   Sig: Take 1 tablet (10 mg) by mouth every 12 hours      Facility-Administered Medications: None     Allergies   No Known Allergies    Social History   I have reviewed this patient's social history and updated it with pertinent information if needed. Sebas Lopez  reports that he has never smoked. He has never used smokeless  tobacco. He reports that he drinks about 3.0 oz of alcohol per week  He reports that he does not use illicit drugs.    Family History   I have reviewed this patient's family history and updated it with pertinent information if needed.   Family History   Problem Relation Age of Onset     Hypertension Father      DIABETES Father        Review of Systems   The 10 point Review of Systems is negative other than noted in the HPI or here.     Physical Exam   Temp: 97.5  F (36.4  C) Temp src: Oral BP: 107/73 Pulse: 107   Resp: 20 SpO2: 95 % O2 Device: None (Room air)    Vital Signs with Ranges  Temp:  [96  F (35.6  C)-97.5  F (36.4  C)] 97.5  F (36.4  C)  Pulse:  [107-114] 107  Resp:  [16-20] 20  BP: (107-124)/(73-86) 107/73  SpO2:  [95 %-98 %] 95 %  143 lbs 14.4 oz    Constitutional: awake, alert, pleasant, chronically ill appearing male, in NAD   ENT: OP is dry, pink, without evidence of thrush or mucositis.  Pupils are round,equal in size, reactive to light.   Respiratory: LS CTA, no wheezes or crackles. Breathing comfortably on room air.   Cardiovascular: HRR, no murmur, no rub. No edema.   GI: abd is distended, firm, without ascitic wave. Mildly tender to palpation. Bowel sounds normal. Ileostomy intact in RLQ, stoma pink, non erythematous, ostomy with liquid yellow-brown output.   Lymph/Hematologic: no s/s bleeding  Or cervical lympadenapthy.   Skin: dry and intact, without evidence of rash or lesions on visible skin   Musculoskeletal:  Appears frail, with poor muscle tone, though moves all extremities spontaneously in bed.  No evidence of tenderness or erythema.    Neurologic: A&O x4, though with difficulty word finding. Cranial nerves II-XII are grossly intact.    Neuropsychiatric: affect calm, pleasant.     Data   Results for orders placed or performed during the hospital encounter of 02/05/18 (from the past 24 hour(s))   Lactic acid level STAT for sepsis protocol   Result Value Ref Range    Lactate for Sepsis  Protocol 1.0 0.4 - 1.9 mmol/L     Most Recent 3 CBC's:  Recent Labs   Lab Test  02/05/18   0916  01/31/18   0913  01/29/18   1332   WBC  36.7*  10.2  9.1   HGB  11.4*  12.7*  12.9*   MCV  81  88  87   PLT  382  485*  442     Most Recent 3 BMP's:  Recent Labs   Lab Test  02/05/18   0916  01/31/18   0913  01/29/18   1332   NA  123*  132*  128*   POTASSIUM  3.5  3.7  3.7   CHLORIDE  86*  96  94   CO2  24  23  24   BUN  40*  17  21   CR  1.57*  0.96  1.08   ANIONGAP  12  13  10   ANNAMARIE  8.7  9.8  9.7   GLC  167*  98  94     Most Recent 2 LFT's:  Recent Labs   Lab Test  02/05/18   0916  01/31/18   0913   AST  18  15   ALT  12  14   ALKPHOS  213*  134   BILITOTAL  0.6  0.6     Most Recent 3 INR's:  Recent Labs   Lab Test  01/26/18   0844  08/10/17   1140   INR  1.25*  1.12

## 2018-02-06 NOTE — PROGRESS NOTES
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 admitted with generalized weakness, leukocytosis, hyponatremia, abdominal pain and nausea/vomiting.       #Leukocytosis. WBC 36.7, ANC 30.5 on admission. Tachycardic, but not hypotensive on admission.  /86.  As patient is presenting with abdominal pain, concern for intra abdominal infection, given previous CT scans.  Abdominal exam with generalized tenderness on exam, but no peritoneal signs. Chest CT on 2/1 negative for pulmonary infectious process and patient without respiratory symptoms. Lactate 1.0 on admission. CT abd/pelvis 2/5 demonstrates obstructing sigmoid colon mass with progression of diffuse colonic distention since 1/26/2018. No evidence of bowel necrosis or perforation. No intra-abdominal infection noted.    - cdiff negative  - UA negative, culture is pending   - bcx 2/5 NGTD  - as patient remains afebrile and WBC is trending down will hold off on starting antibiotics at this time.      #PAULINA.  #Hyponatremia.   Cr elevated to 1.57 on admission. Baseline Cr 0.1-1.0.  BUN/Cr ratio elevated. Sodium low at 123 on admission and patient having difficulty with word finding, feeling \"foggy\" on admission. Likely due to hypovolemia, as patient has not been eating/drinking well due to nausea, ileostomy with high volume liquid output.  Sodium and creatinine have improved with IVF.  - UA negative, culture pending.  Urine sodium consistent with hypovolemia.   - will aggressively hydrate IVF @ 150mL/hr     - monitor Na+ q6h      # Abdominal Pain, Nausea/vomiting/diarrhea 2/2 Colonic Mass/Colonic distention. Ongoing issue.  Not able to eat/drink more than small sips due to nausea/pain.  Per Dr. Long's last note, CT A/p on 1/26 demonstrates " stable disease, does not appear to be obstruction proximal to ileostomy. Patient reports he has been trying to take antiemetics scheduled at home, but continues to have nausea and bloating with with PO intake.  Not vomiting. Has ongoing high volume output from ileostomy. No blood in stools. Per clinic notes, there has been consideration of placing venting G tube or starting patient on TPN outpatient -- may need to consider if pt continues to have nausea with PO intake. Of note, patient does follow with palliative care outpatient. CT abd/pelvis 2/5 demonstrates obstructing sigmoid colon mass with progression of diffuse colonic distention since 1/26/2018. No evidence of bowel necrosis or perforation.   - schedule zofran q8h, compazine, ativan prn for nausea management   - MIVF as above   - scheduled oxycontin 10mg BID -- may need to consider increasing if pain is uncontrolled  - oxycodone 5-10 mg q4h prn for pain control  -  Nutrition consulted  - hold octreotide at this time   - will plan to start TPN/lipids while inpatient -- pharmacy/nutrition to dose    - consulted colorectal surgery who recommend cecostomy placement in IR to remove some of the fluid in cecum that may be contributing to symptoms.   - consulted GI for consideration of colonic stent placement -- GI plans to attempt colonic stent placement today to help with symptoms, if unsuccessful, will plan for cecostomy tube tomorrow in IR.   - IR consulted for cecostomy placement and/or venting G tube placement.  PEG may be needed eventually, but not likely to help symptoms at this time.       # Stage IV Sigmoid Adenocarcinoma with peritoneal carcinomatosis.  S/p ex lap 7/2017 with small bowel resection and end ileostomy. S/p 6 cycles of FOLFOX. Admitted on 12/6/2017 for ex-lap with aborted HIPEC due to diffuse carcinomatosis. Follows with Dr. Long outpatient. Next generation sequencing--no mutations were identified in analyzed regions of KRAS, NRAS, BRAF, HRAS  or PIK3CA. Therefore, this patient may be eligible for anti-EGFR antibody therapy if clinically indicated .  MSI testing with no microsatellite instability. CT A/P on 1/26 with 5.2 x 4.5 x 3.6cm proximal sigmoid mass, causing colonic obstruction with significant distention of cecum (7cm in diameter), ascending colon and proximal transverse colon.  No pneumatosis or pneumoperitoneum . also small volume ascites. No surgical intervention per Dr. Dudley (surg onc)   - will need to f/u with Dr. Long for further treatment plan   - consider palliative care consult for symptom management     # Anemia of chronic disease.   Pt with Hgb of 11.4 on admission. Has previously been on IV/PO iron supplementation, not currently taking. Stable. No evidence of bleeding.   - monitor, transfuse to keep Hgb > 7.0      # Generalized weakness 2/2 malignancy, possible infection, poor nutrition. Pt reports weakness has become progressive over past couple of weeks, has noted it more as he has been not eating or drinking as much.   - PT/OT consult   - nutrition consult      FEN  - regular diet as tolerated   - lyte replacement prn   - MIVF @ 150/hr   - nutrition consult as above    PPx:   GI: prilosec 20mg daily   VTE: no lovenox on admission due to possible procedure, will add lovenox 40mg SQ daily this evening.      Code status: full code      Dispo/follow up:  Pt will need f/u scheduled early next week at discharge for labs/ ELIF visit.  Will discuss with care coordinator.  Anticipate hospitalization of 2-3 days to correct hyponatremia, symptom management.        Above discussed with staff attending, Dr. Palacio.      Es Dumont, DNP, APRN, CNP  Hematology/oncology  [id number]        Interval History   No acute events overnight.  Remains afebrile. Continues to have abdominal pain, feels it is about the same as yesterday.  Feels that it could be gas pain, cramping in nature. Continues to have intermittent nausea, but seems to be controlled  with scheduled antiemetics.  Slept ok last night. Feeling a little better today after IV hydration.       Physical Exam   Temp: 97.1  F (36.2  C) Temp src: Oral BP: 109/74 Pulse: 90   Resp: 16 SpO2: 97 % O2 Device: None (Room air)    Vitals:    02/05/18 1124 02/06/18 0824   Weight: 65.3 kg (143 lb 14.4 oz) 66.5 kg (146 lb 8 oz)     Vital Signs with Ranges  Temp:  [97.1  F (36.2  C)-99.9  F (37.7  C)] 97.1  F (36.2  C)  Pulse:  [] 90  Resp:  [16-20] 16  BP: (104-116)/(66-74) 109/74  SpO2:  [94 %-97 %] 97 %  I/O last 3 completed shifts:  In: 4015.83 [P.O.:1160; I.V.:2855.83]  Out: 1990 [Urine:1140; Stool:850]    Constitutional: awake, alert, pleasant, chronically ill appearing male, in NAD   ENT: OP is dry, pink, without evidence of thrush or mucositis.  Pupils are round,equal in size, reactive to light.   Respiratory: LS CTA, no wheezes or crackles. Breathing comfortably on room air.   Cardiovascular: HRR, no murmur, no rub. No edema.   GI: abd is distended, firm, without ascitic wave. Mildly tender to palpation. Bowel sounds normal. Ileostomy intact in RLQ, stoma pink, non erythematous, ostomy with liquid yellow-brown output.   Lymph/Hematologic: no s/s bleeding  Or cervical lympadenapthy.   Skin: dry and intact, without evidence of rash or lesions on visible skin   Musculoskeletal:  Appears frail, with poor muscle tone, though moves all extremities spontaneously in bed.  No evidence of tenderness or erythema.    Neurologic: A&O x4, though with difficulty word finding. Cranial nerves II-XII are grossly intact.    Neuropsychiatric: affect calm, pleasant.     Medications     - MEDICATION INSTRUCTIONS -       NaCl 150 mL/hr at 02/06/18 0842       ceFAZolin  2 g Intravenous Pre-Op/Pre-procedure x 1 dose     magnesium oxide  400 mg Oral BID     omeprazole  20 mg Oral Daily     oxyCODONE  10 mg Oral Q12H     ondansetron  8 mg Intravenous Q8H    Or     ondansetron  8 mg Oral Q8H    Or     ondansetron  8 mg Oral Q8H      sodium chloride (PF)  3 mL Intracatheter Q8H       Data   Results for orders placed or performed during the hospital encounter of 02/05/18 (from the past 24 hour(s))   Lactic acid level STAT for sepsis protocol   Result Value Ref Range    Lactate for Sepsis Protocol 1.0 0.4 - 1.9 mmol/L   Urine Culture Aerobic Bacterial   Result Value Ref Range    Specimen Description Catheterized Urine     Special Requests Specimen received in preservative     Culture Micro Culture in progress    UA with Microscopic   Result Value Ref Range    Color Urine Yellow     Appearance Urine Slightly Cloudy     Glucose Urine Negative NEG^Negative mg/dL    Bilirubin Urine Negative NEG^Negative    Ketones Urine Negative NEG^Negative mg/dL    Specific Gravity Urine 1.016 1.003 - 1.035    Blood Urine Negative NEG^Negative    pH Urine 5.5 5.0 - 7.0 pH    Protein Albumin Urine 30 (A) NEG^Negative mg/dL    Urobilinogen mg/dL Normal 0.0 - 2.0 mg/dL    Nitrite Urine Negative NEG^Negative    Leukocyte Esterase Urine Negative NEG^Negative    Source Catheterized Urine     WBC Urine 2 0 - 2 /HPF    RBC Urine <1 0 - 2 /HPF    Transitional Epi <1 0 - 1 /HPF    Mucous Urine Present (A) NEG^Negative /LPF    Hyaline Casts 31 (H) 0 - 2 /LPF    Granular Casts 4 (A) NEG^Negative /LPF   Clostridium difficile toxin B PCR   Result Value Ref Range    Specimen Description Feces     C Diff Toxin B PCR Negative NEG^Negative   Osmolality urine   Result Value Ref Range    Urine Osmolality 269 100 - 1200 mmol/kg   Sodium random urine   Result Value Ref Range    Sodium Urine mmol/L <5 mmol/L   CT Abdomen Pelvis w/o & w Contrast   Result Value Ref Range    Radiologist flags Increased colonic distention (Urgent)     Narrative    EXAMINATION: CT ABDOMEN PELVIS W/O & W CONTRAST, 2/5/2018 3:44  PM    TECHNIQUE:  Helical CT images from the lung bases through the  symphysis pubis were obtained with IV contrast. Contrast dose:  iopamidol (ISOVUE-370) solution 88  mL    COMPARISON: 1/26/2018    HISTORY: pt with metastatic colon cancer, PAULINA, abd pain/nausea,  r/o  infection;     FINDINGS:    Abdomen and pelvis: Obstructing mass in the sigmoid colon measuring  proximally 5.5 x 4.2 cm in the axial plane (series 2 image 150), which  is not significantly changed from 1/26/2018. There is distention of  the upstream colon and terminal ileum stump, progressed from  1/26/2018. For example the cecum measures up to 11.6 cm in diameter,  previously 7.5 cm. There is a right lower quadrant end ileostomy,  without distention of the upstream small bowel loops. There is  moderate layering free fluid in the pelvis, increased from 1/26/2018.  No free air. No pneumatosis, portal or mesenteric venous gas.  Peritoneal soft tissue thickening, most prominent in the anterior  midline abdomen  (series 2 image 111), and in the right paracolic  gutter (series 2 image 109), is not significantly changed. Presumed  hernia mesh repair in the midline anterior abdomen.    The liver, gallbladder, pancreas, spleen, and adrenals are within  normal limits. Simple cysts in the right kidney. No hydronephrosis or  hydroureter. Bladder is obscured by streak artifact from left hip  arthroplasty. Prostate is mildly enlarged. Major abdominal vasculature  is patent. Aorta is normal in caliber. Mildly prominent mesenteric  lymph nodes are progressed from 1/26/2018, for example 1.0 cm short  axis diameter mesenteric node adjacent to the superior mesenteric vein  (series 2 image 80), previously measured 0.6 cm, and 1.1 cm short axis  diameter node anterior to the to the inferior vena cava (series 2  image 96), previously measured 0.8 cm.    Lung bases: Linear scarring/atelectasis in both lung bases.    Bones and soft tissues: No acute fracture or suspicious bone lesion.  Total left hip arthroplasty. Presumed degenerative cystic change in  the left acetabulum.      Impression    IMPRESSION:   1. Obstructing sigmoid colon  mass with progression of diffuse colonic  distention since 1/26/2018. No evidence of bowel necrosis or  perforation.   2. Right lower quadrant end ileostomy, without evidence of obstruction  of the upstream small bowel.  3. Mild mesenteric lymphadenopathy, increased from 1/26/2018, may  reflect mesenteric congestion.  4. Peritoneal thickening and nodularity is not significantly changed.  5. Increased small ascites.    [Urgent Result: Increased colonic distention]    Finding was identified on 2/5/2018 3:55 PM.     Dr. Norman was contacted by Dr. Harris at 2/5/2018 4:28 PM and  verbalized understanding of the urgent finding.     I have personally reviewed the examination and initial interpretation  and I agree with the findings.    TARAH DIAZ MD   Sodium   Result Value Ref Range    Sodium 123 (L) 133 - 144 mmol/L   Blood culture   Result Value Ref Range    Specimen Description Blood Blue port     Culture Micro No growth after 13 hours    Blood culture   Result Value Ref Range    Specimen Description Blood Left Hand     Culture Micro No growth after 13 hours    Interventional Radiology Adult/Peds IP Consult: Patient to be seen: Routine within 24 hours; Call back #: [phone] or please call oncology ELIF assigned in AM per sticky note; Colon obstruction with distention, needs cecostomy    Narrative    Jayshree Cain APRN CNP     2/6/2018  9:58 AM  Patient is on IR schedule 2/6/2018 for a Image guided placement   of cecostomy tube.   Labs WNL for procedure.   Orders for NPO, scrubs and antibiotics have been entered.   Consent will be done prior to procedure.     Please contact the IR [place] RN at [phone] for estimated time of   procedure.     Case discussed with Dr. Dowd from IR and TRACI Suggs from   oncology. We are able to place a pigtail drain into the colon.   Due to caliber of the drains placed in IR, only liquid is going   to drain through this drain. There is risk of clogging with stool   if  not flushed aggressively to maintain patency. Team and patient   will need to understand that this drain will need to remain in   place permanently. Removal of the drain would risk chronic   fistulization of colon to the skin.     Jayshree Cain DNP, APRN  Interventional Radiology  Phone: [phone]  Pager: [phone]     Sodium   Result Value Ref Range    Sodium 125 (L) 133 - 144 mmol/L   Sodium   Result Value Ref Range    Sodium 126 (L) 133 - 144 mmol/L   CBC with platelets differential   Result Value Ref Range    WBC 33.2 (H) 4.0 - 11.0 10e9/L    RBC Count 3.28 (L) 4.4 - 5.9 10e12/L    Hemoglobin 9.4 (L) 13.3 - 17.7 g/dL    Hematocrit 27.3 (L) 40.0 - 53.0 %    MCV 83 78 - 100 fl    MCH 28.7 26.5 - 33.0 pg    MCHC 34.4 31.5 - 36.5 g/dL    RDW 15.4 (H) 10.0 - 15.0 %    Platelet Count 341 150 - 450 10e9/L    Diff Method Automated Method     % Neutrophils 80.9 %    % Lymphocytes 4.8 %    % Monocytes 9.9 %    % Eosinophils 0.5 %    % Basophils 0.1 %    % Immature Granulocytes 3.8 %    Nucleated RBCs 0 0 /100    Absolute Neutrophil 26.8 (H) 1.6 - 8.3 10e9/L    Absolute Lymphocytes 1.6 0.8 - 5.3 10e9/L    Absolute Monocytes 3.3 (H) 0.0 - 1.3 10e9/L    Absolute Eosinophils 0.2 0.0 - 0.7 10e9/L    Absolute Basophils 0.0 0.0 - 0.2 10e9/L    Abs Immature Granulocytes 1.3 (H) 0 - 0.4 10e9/L    Absolute Nucleated RBC 0.0     Platelet Estimate Confirming automated cell count    Comprehensive metabolic panel   Result Value Ref Range    Sodium 128 (L) 133 - 144 mmol/L    Potassium 3.5 3.4 - 5.3 mmol/L    Chloride 93 (L) 94 - 109 mmol/L    Carbon Dioxide 26 20 - 32 mmol/L    Anion Gap 9 3 - 14 mmol/L    Glucose 94 70 - 99 mg/dL    Urea Nitrogen 24 7 - 30 mg/dL    Creatinine 0.94 0.66 - 1.25 mg/dL    GFR Estimate 84 >60 mL/min/1.7m2    GFR Estimate If Black >90 >60 mL/min/1.7m2    Calcium 8.4 (L) 8.5 - 10.1 mg/dL    Bilirubin Total 0.9 0.2 - 1.3 mg/dL    Albumin 1.8 (L) 3.4 - 5.0 g/dL    Protein Total 6.6 (L) 6.8 - 8.8 g/dL     Alkaline Phosphatase 229 (H) 40 - 150 U/L    ALT 10 0 - 70 U/L    AST 16 0 - 45 U/L   Magnesium   Result Value Ref Range    Magnesium 1.9 1.6 - 2.3 mg/dL   Phosphorus   Result Value Ref Range    Phosphorus 2.2 (L) 2.5 - 4.5 mg/dL       Recent Labs  Lab 02/06/18  0544 02/06/18  0000 02/05/18  2125  02/05/18  0916 01/31/18  0913   WBC 33.2*  --   --   --  36.7* 10.2   HGB 9.4*  --   --   --  11.4* 12.7*   MCV 83  --   --   --  81 88     --   --   --  382 485*   * 126* 125*  < > 123* 132*   POTASSIUM 3.5  --   --   --  3.5 3.7   CHLORIDE 93*  --   --   --  86* 96   CO2 26  --   --   --  24 23   BUN 24  --   --   --  40* 17   CR 0.94  --   --   --  1.57* 0.96   ANIONGAP 9  --   --   --  12 13   ANNAMARIE 8.4*  --   --   --  8.7 9.8   GLC 94  --   --   --  167* 98   ALBUMIN 1.8*  --   --   --  2.5* 3.4   PROTTOTAL 6.6*  --   --   --  8.1 9.0*   BILITOTAL 0.9  --   --   --  0.6 0.6   ALKPHOS 229*  --   --   --  213* 134   ALT 10  --   --   --  12 14   AST 16  --   --   --  18 15   < > = values in this interval not displayed.  Recent Results (from the past 24 hour(s))   CT Abdomen Pelvis w/o & w Contrast   Result Value    Radiologist flags Increased colonic distention (Urgent)    Narrative    EXAMINATION: CT ABDOMEN PELVIS W/O & W CONTRAST, 2/5/2018 3:44  PM    TECHNIQUE:  Helical CT images from the lung bases through the  symphysis pubis were obtained with IV contrast. Contrast dose:  iopamidol (ISOVUE-370) solution 88 mL    COMPARISON: 1/26/2018    HISTORY: pt with metastatic colon cancer, PAULINA, abd pain/nausea,  r/o  infection;     FINDINGS:    Abdomen and pelvis: Obstructing mass in the sigmoid colon measuring  proximally 5.5 x 4.2 cm in the axial plane (series 2 image 150), which  is not significantly changed from 1/26/2018. There is distention of  the upstream colon and terminal ileum stump, progressed from  1/26/2018. For example the cecum measures up to 11.6 cm in diameter,  previously 7.5 cm. There is a  right lower quadrant end ileostomy,  without distention of the upstream small bowel loops. There is  moderate layering free fluid in the pelvis, increased from 1/26/2018.  No free air. No pneumatosis, portal or mesenteric venous gas.  Peritoneal soft tissue thickening, most prominent in the anterior  midline abdomen  (series 2 image 111), and in the right paracolic  gutter (series 2 image 109), is not significantly changed. Presumed  hernia mesh repair in the midline anterior abdomen.    The liver, gallbladder, pancreas, spleen, and adrenals are within  normal limits. Simple cysts in the right kidney. No hydronephrosis or  hydroureter. Bladder is obscured by streak artifact from left hip  arthroplasty. Prostate is mildly enlarged. Major abdominal vasculature  is patent. Aorta is normal in caliber. Mildly prominent mesenteric  lymph nodes are progressed from 1/26/2018, for example 1.0 cm short  axis diameter mesenteric node adjacent to the superior mesenteric vein  (series 2 image 80), previously measured 0.6 cm, and 1.1 cm short axis  diameter node anterior to the to the inferior vena cava (series 2  image 96), previously measured 0.8 cm.    Lung bases: Linear scarring/atelectasis in both lung bases.    Bones and soft tissues: No acute fracture or suspicious bone lesion.  Total left hip arthroplasty. Presumed degenerative cystic change in  the left acetabulum.      Impression    IMPRESSION:   1. Obstructing sigmoid colon mass with progression of diffuse colonic  distention since 1/26/2018. No evidence of bowel necrosis or  perforation.   2. Right lower quadrant end ileostomy, without evidence of obstruction  of the upstream small bowel.  3. Mild mesenteric lymphadenopathy, increased from 1/26/2018, may  reflect mesenteric congestion.  4. Peritoneal thickening and nodularity is not significantly changed.  5. Increased small ascites.    [Urgent Result: Increased colonic distention]    Finding was identified on  2/5/2018 3:55 PM.     Dr. Norman was contacted by Dr. Harris at 2/5/2018 4:28 PM and  verbalized understanding of the urgent finding.     I have personally reviewed the examination and initial interpretation  and I agree with the findings.    TARAH DIAZ MD

## 2018-02-06 NOTE — PROGRESS NOTES
02/06/18 0830   Quick Adds   Type of Visit Initial PT Evaluation   Living Environment   Lives With significant other   Living Arrangements house   Home Accessibility stairs within home;bed and bath are not on the first floor;tub/shower is not walk in   Number of Stairs to Enter Home 0   Number of Stairs Within Home 6  (Split level entry, 1 flight to basement)   Transportation Available family or friend will provide   Living Environment Comment Pt lives at home with his SO, split level entry, 6 stairs up to bedroom/bathroom, has basement but does not have to go down there (1 flight of stairs down)   Self-Care   Dominant Hand right   Usual Activity Tolerance good   Current Activity Tolerance moderate   Regular Exercise no   Equipment Currently Used at Home none   Activity/Exercise/Self-Care Comment Pt was IND with all ADL's prior to admission.  Pt not working, girlfriend works from home.  Pt is driving and doing chores around the house but over last 2 weeks has been feeling more distended and not moving around as much.   Functional Level Prior   Ambulation 0-->independent   Transferring 0-->independent   Toileting 0-->independent   Bathing 0-->independent   Dressing 0-->independent   Eating 0-->independent   Communication 0-->understands/communicates without difficulty   Swallowing 0-->swallows foods/liquids without difficulty   Cognition 0 - no cognition issues reported   Fall history within last six months no   Which of the above functional risks had a recent onset or change? ambulation   Prior Functional Level Comment Pt IND prior to admission.   General Information   Onset of Illness/Injury or Date of Surgery - Date 02/05/18   Referring Physician ANNAMARIE Espinosa   Patient/Family Goals Statement Pt wants to return home.   Pertinent History of Current Problem (include personal factors and/or comorbidities that impact the POC) 52 year old with non-resectable diffuse adenocarcinoma involving small bowel, now  sigmoid, with carcinomatosis, recently aborted plans for cytoreductive surgery/HIPEC and followed by Palliative Care who presents with abdominal pain, nausea, malaise and diffuse colonic dilation with ongoing stool/gas output from diverting ileostomy who would benefit from palliative decompression   Precautions/Limitations fall precautions   Weight-Bearing Status - LUE full weight-bearing   Weight-Bearing Status - RUE full weight-bearing   Weight-Bearing Status - LLE full weight-bearing   Weight-Bearing Status - RLE full weight-bearing   General Observations On RA, abdominal distension noted   General Info Comments Activity: up ad juan   Cognitive Status Examination   Orientation orientation to person, place and time   Level of Consciousness alert   Follows Commands and Answers Questions 100% of the time   Personal Safety and Judgment intact   Memory intact   Pain Assessment   Patient Currently in Pain Yes, see Vital Sign flowsheet  (More abdominal discomfort)   Integumentary/Edema   Integumentary/Edema Comments Abdominal distension, no swelling   Posture    Posture Not impaired   Range of Motion (ROM)   ROM Quick Adds No deficits were identified   Strength   Strength Comments Generalized weakness in B LE's and core for deconditioning and inactivity   Bed Mobility   Bed Mobility Comments IND   Transfer Skills   Transfer Comments IND   Gait   Gait Comments Ambulated 125' x 2 with use of close SBA, slight instability with lateral mis steps taken, no overt LOB.   Balance   Balance Comments Impaired standing dynamic balance as evidenced by need for assist with ambulation.   Sensory Examination   Sensory Perception no deficits were identified   Coordination   Coordination no deficits were identified   Muscle Tone   Muscle Tone no deficits were identified   General Therapy Interventions   Planned Therapy Interventions balance training;gait training;strengthening;risk factor education;home program guidelines;progressive  "activity/exercise   Clinical Impression   Criteria for Skilled Therapeutic Intervention yes, treatment indicated   PT Diagnosis Gait Instability   Influenced by the following impairments Decreased strength, balance and activity tolerance   Functional limitations due to impairments Inability to perform functional mobility at baseline level of functioning   Clinical Presentation Evolving/Changing   Clinical Presentation Rationale Medically changing with abdominal distension, pending medical course   Clinical Decision Making (Complexity) Low complexity   Therapy Frequency` 5 times/week   Predicted Duration of Therapy Intervention (days/wks) 2 weeks   Anticipated Discharge Disposition Home with Assist   Risk & Benefits of therapy have been explained Yes   Patient, Family & other staff in agreement with plan of care Yes   Clinical Impression Comments Pt would benefit from in-patient PT in order to increase strength, stability and safety with ambulation to enusre safety with d/c home.   [facility] [employer] \"6 Clicks\"   2016, Trustees of [facility], under license to [employer].  All rights reserved.   6 Clicks Short Forms Basic Mobility Inpatient Short Form   [facility] AM-PAC  \"6 Clicks\" V.2 Basic Mobility Inpatient Short Form   1. Turning from your back to your side while in a flat bed without using bedrails? 4 - None   2. Moving from lying on your back to sitting on the side of a flat bed without using bedrails? 4 - None   3. Moving to and from a bed to a chair (including a wheelchair)? 4 - None   4. Standing up from a chair using your arms (e.g., wheelchair, or bedside chair)? 4 - None   5. To walk in hospital room? 3 - A Little   6. Climbing 3-5 steps with a railing? 3 - A Little   Basic Mobility Raw Score (Score out of 24.Lower scores equate to lower levels of function) 22   Total Evaluation Time   Total Evaluation Time (Minutes) 10     "

## 2018-02-06 NOTE — CONSULTS
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 Gastroenterology team has been consulted to discuss the utility of colonic stenting to help relieve his malignant obstruction.       At this time, I believe that most of his symptoms are likely stemming from ileus and small bowel dysmotility d/t to extensive peritoneal carcinomatosis (vs compression from distended colon) which is confirmed by decrease in his ostomy output in the last several days. Colonic distention by itself with a cecum of 7 cm will not be expected to cause him this degree of symptoms though are likely contributing.   Discussed in detail about options available to relieve his colonic obstruction including expectant management, colonic stent placement and cecostomy tube placement. Detailed considerable risk of stent migration, perforation (1 in 9 patients) and risk of bleeding/infection to the patient and his parents. At this time, they will prefer a colonic stent placement over IR guided cecostomy tube placement [primarily due to concern for another drain management at home in addition to ileostomy].    RECOMMENDATIONS:  - Please keep him NPO.  - Pain and nausea control as per primary team. If symptoms of nausea persist despite colonic stent placement and relief of his obstruction, he may benefit from a venting G placement.  - Plan for colonic stent placement in OR today. Two tap water enemas prior to the procedure.  - If fails colonic stent placement, can get an IR cecostomy tube tomorrow.    Gastroenterology  team will continue to follow with you. Thank you for involving us in this patient's care. Please do not hesitate to contact the GI service with any questions or concerns.   Patient care plan has been discussed with Dr. Agarwal, GI staff physician.    Jonatan Marquez  Gastroenterology Fellow  P [phone]  -------------------------------------------------------------------------------------------------------------------          Chief Complaint:   We were asked by Saman Norman of the hematology/oncology team to evaluate this patient with sigmoid colonic mass causing obstruction  History is obtained from the patient and the medical record.          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 Gastroenterology team has been consulted to discuss the utility of colonic stenting to help relieve his malignant obstruction.      Mr. Lopez has a hx of ulcerative colitis (unknown extent of involvement but thought to be pan colonic) that was diagnosed in the 1990s but was on no medical management and had poor endoscopic surveillance. He initially presented in May 2017 with obstructive GI symptoms.  A CT scan at that time showed sigmoid thickening and diffuse abdominal adenopathy as well as free fluid near his abdominal wall mesh.  Colonoscopy at that time showed an obstructing sigmoid colon mass.  Biopsies showed adenocarcinoma.  His obstructive symptoms worsened and he underwent exploratory laparotomy by Dr. Bartholomew in 07/2017.  Diffuse peritoneal carcinomatosis was found and this  was confirmed with biopsies.  There were multiple large peritoneal metastases to his abdominal wall mesh and multiple loops of small bowel.  A small bowel resection was performed at that time with an end ileostomy. He was started on systemic chemotherapy as a first step.  He received a total of 6 cycles of FOLFOX and he tolerated this relatively well. He underwent interval imaging with a CT of the chest, abdomen and pelvis and this showed stable disease with no significant changes in his mesenteric nodules.  There was no evidence of metastatic disease in the liver or chest. He then underwent an exploratory laparotomy with plans for cytoreductive surgery and hyperthermic intraperitoneal chemotherapy but the procedure was aborted due to diffuse miliary involvement of his small intestine and his small bowel mesentery (on 12/6/17). Plan was  to resume FOLFOX with oxaliplatin [dose reduced due to neuropathy]. He was admitted ED on 1/26/18 with abdominal pain and nausea. CT abdomen/pelvis 1/26/18 showed a proximal sigmoid colonic mass causing obstruction with distention of the cecum to 7 cm. He was discharged home with oral pain meds.     He was seen in oncology clinic again as follow up on 2/5/18 (yesterday) at which time he reported ongoing abdominal pain for quite some time due to his cancer diagnosis but had progressed over the last 2 weeks. It was decided to admit him to the hospital. He describes the abdominal pain as cramping and constant which is associated with nausea without vomiting.  He has been unable to tolerate PO intake due to nausea, and has noted that he has become generally weaker due to poor PO intake. He also reports that his ostomy always has high volume output but has decreased somewhat in the last several weeks. It continues to put out liquid stool.   Lab work on admission has revealed significant leukocytosis with white count of 26k. He has hyponatremia, hypophosphatemia and hypoalbunimeia. Hb  this AM is 9.4. Repeat CT is showing Obstructing sigmoid colon mass with progression of diffuse colonic distention since 1/26/2018.          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      HERNIA REPAIR       INSERT PORT VASCULAR ACCESS Right 8/10/2017    Procedure: INSERT PORT VASCULAR ACCESS;  Single Lumen Right Chest Power Port;  Surgeon: Malena Andrew PA-C;  Location: [facility]     LAPAROSCOPY DIAGNOSTIC (GENERAL) N/A 12/6/2017    Procedure: LAPAROSCOPY DIAGNOSTIC (GENERAL);  Diagnostic Laparoscopy, Exploratory Laparotomy Anesthesia Block ;  Surgeon: Rob Dudley MD;  Location: [facility] OR     LAPAROTOMY EXPLORATORY N/A 12/6/2017    Procedure: LAPAROTOMY EXPLORATORY;;  Surgeon: Rob Dudley MD;  Location: [facility] OR     ORTHOPEDIC SURGERY Left     HIP ARTHROPLASTY            Previous Endoscopy:     Results for orders placed or performed during the hospital encounter of 11/30/17   COLONOSCOPY   Result Value Ref Range    COLONOSCOPY       [address] [place] [address] [phone]     Endoscopy Department  _______________________________________________________________________________  Patient Name: Sebas Lopez             Procedure Date: 11/30/2017 12:37 PM  MRN: [id number]                       Account Number: [id number]  YOB: 1965              Admit Type: Outpatient  Age: 52                               Room: [facility]  Gender: Male                          Note Status: Finalized  Attending MD: Rob Dudley MD Pause for the Cause: pause for cause   completed  Total Sedation Time: 13 minutes.        _______________________________________________________________________________     Procedure:           Colonoscopy  Indications:         Ulcerative colitis with metastatic sigmoid                        adenocarcinoma and peritoneal carcinomatosis. On                        chemotherapy.  Providers:           Rob Dudley MD, Chip Malhotra, RN  Referring MD :        Rob Dudley MD  Requesting Provider: Mary Hair  Medicines:           Midazolam 3 mg IV, Fentanyl 150 micrograms IV  Complications:       No immediate complications.  _______________________________________________________________________________  Procedure:           Pre-Anesthesia Assessment:                       - Prior to the procedure, a History and Physical was                        performed, and patient medications and allergies were                        reviewed. The patient is competent. The risks and                        benefits of the procedure and the sedation options and                        risks were discussed with the patient. All questions                        were answered and informed consent was obtained. Patient                        identification and proposed procedure were verified by                        the physician and the nurse in the pre-procedure area in                        the endoscopy suite. Mental Status Examinati on: alert                        and oriented. Prophylactic Antibiotics: The patient does                        not require prophylactic antibiotics. Prior                         Anticoagulants: The patient has taken no previous                        anticoagulant or antiplatelet agents. ASA Grade                        Assessment: III - A patient with severe systemic                        disease. After reviewing the risks and benefits, the                        patient was deemed in satisfactory condition to undergo                        the procedure. The anesthesia plan was to use moderate                        sedation / analgesia (conscious sedation). Immediately                        prior to administration of medications, the patient was                        re-assessed for adequacy to receive sedatives. The heart                        rate, respiratory rate, oxygen saturations, blood                        pressure, adequacy of pulmonary ventilation, and                         response to care were monitored throughout the                        procedure. The physical status of the patient was                        re-assessed after the procedure.                       After obtaining informed consent, the colonoscope was                        passed under direct vision. Throughout the procedure,                        the patient's blood pressure, pulse, and oxygen                        saturations were monitored continuously. The Colonoscope                        was introduced through the anus and advanced to the                        cecum, identified by appendiceal orifice and ileocecal                        valve. The colonoscopy was performed without difficulty.                        The patient tolerated the procedure well. The quality of                        the bowel preparation was fair.                                                                                   Findings:       The perianal examination was normal.        The digital rectal exam findings include rectal thickening but no mass        per se.       Inflammation was found in a  continuous and circumferential pattern from        the rectum to the ascending colon. This was graded as Rivas Score 2        (moderate, with marked erythema, absent vascular pattern, friability,        erosions).       The terminal ileum appeared normal.       An ulcerated partially obstructing large mass was found in the sigmoid        colon. The mass was circumferential. The mass measured four cm in        length. In addition, its diameter measured thirteen mm. Oozing was        present.                                                                                   Impression:          - Preparation of the colon was fair. Patient is diverted                        with ileostomy and only did 2 Fleets for prep.                       - Abnormal digital rectal exam.                       - Moderately active (Rivas Score 2) ulcerative colitis.                       -  The examined portion of the ileum was normal.                       - Malignant partially obstructing tumor in the sigmoid                        colon.                       - No specimens collected.  Recommendation:      - Discharge patient to home.                                                                                     _______________________  Rob Dudley MD  11/30/2017 1:54:20 PM  I was physically present for the entire viewing portion of the exam.  __________________________  Signature of teaching physician  [id number] Dudley MD  Number of Addenda: 0    Note Initiated On: 11/30/2017 12:37 PM  Scope In:  Scope Out:              Social History:   Reviewed and edited as appropriate  Social History     Social History     Marital status: Single     Spouse name: N/A     Number of children: N/A     Years of education: N/A     Occupational History     Not on file.     Social History Main Topics     Smoking status: Never Smoker     Smokeless tobacco: Never Used     Alcohol use 3.0 oz/week     5 Cans of beer per week       Comment: none for last 4 months     Drug use: No     Sexual activity: Not on file     Other Topics Concern     Not on file     Social History Narrative            Family History:   Reviewed and edited as appropriate  No known history of gastrointestinal/liver disease or  gastrointestinal malignancies.  - Personal hx of UC and hx of sigmoid adenocarcinoma.         Allergies:   Reviewed and edited as appropriate   No Known Allergies         Medications:     Current Facility-Administered Medications   Medication     ceFAZolin (ANCEF) intermittent infusion 2 g in 100 mL dextrose PRE-MIX     [Auto Hold] simethicone (MYLICON) chewable tablet 80 mg     0.9% sodium chloride BOLUS     lidocaine 1 % 1 mL     lidocaine (LMX4) kit     sodium chloride (PF) 0.9% PF flush 3 mL     sodium chloride (PF) 0.9% PF flush 3 mL     May continue current IV fluids if patient has IV fluids infusing.     May take regular AM medications except those listed below     0.9% sodium chloride infusion     Medication Instruction     0.9% sodium chloride infusion     [Auto Hold] LORazepam (ATIVAN) tablet 0.5-1 mg    Or     [Auto Hold] LORazepam (ATIVAN) injection 0.5-1 mg     [Auto Hold] acetaminophen (TYLENOL) tablet 650 mg     [Auto Hold] prochlorperazine (COMPAZINE) tablet 5-10 mg    Or     [Auto Hold] prochlorperazine (COMPAZINE) injection 5-10 mg     [Auto Hold] potassium chloride SA (K-DUR/KLOR-CON M) CR tablet 20-40 mEq     [Auto Hold] potassium chloride (KLOR-CON) Packet 20-40 mEq     [Auto Hold] potassium chloride 10 mEq in 100 mL sterile water intermittent infusion (premix)     [Auto Hold] potassium chloride 10 mEq in 100 mL intermittent infusion with 10 mg lidocaine     [Auto Hold] potassium chloride 20 mEq in 50 mL intermittent infusion     [Auto Hold] magnesium sulfate 4 g in 100 mL sterile water (premade)     [Auto Hold] potassium phosphate 15 mmol in D5W 250 mL intermittent infusion     [Auto Hold] potassium phosphate 20 mmol in D5W 500  "mL intermittent infusion     [Auto Hold] potassium phosphate 20 mmol in D5W 250 mL intermittent infusion     [Auto Hold] potassium phosphate 25 mmol in D5W 500 mL intermittent infusion     [Auto Hold] omeprazole (priLOSEC) CR capsule 20 mg     [Auto Hold] oxyCODONE IR (ROXICODONE) tablet 5-10 mg     [Auto Hold] oxyCODONE (OxyCONTIN) 12 hr tablet 10 mg     [Auto Hold] ondansetron (ZOFRAN) injection 8 mg    Or     [Auto Hold] ondansetron (ZOFRAN-ODT) ODT tab 8 mg    Or     [Auto Hold] ondansetron (ZOFRAN) tablet 8 mg     [Auto Hold] naloxone (NARCAN) injection 0.1-0.4 mg     [Auto Hold] sodium chloride (PF) 0.9% PF flush 3 mL             Review of Systems:   A complete review of systems was performed and is negative except as noted in the HPI           Physical Exam:   BP 97/67 (BP Location: Left arm)  Pulse 89  Temp 96.7  F (35.9  C) (Oral)  Resp 16  Ht 1.753 m (5' 9\")  Wt 66.5 kg (146 lb 8 oz)  SpO2 96%  BMI 21.63 kg/m2  Wt:   Wt Readings from Last 2 Encounters:   02/06/18 66.5 kg (146 lb 8 oz)   02/05/18 66.5 kg (146 lb 11.2 oz)      Constitutional: Middle aged frail gentleman, cooperative, pleasant, not dyspneic/diaphoretic, no acute distress  Eyes: Sclera anicteric/injected  Ears/nose/mouth/throat: Normal oropharynx without ulcers or exudate, mucus membranes moist, hearing intact  Neck: supple, thyroid normal size  CV: No edema  Respiratory: Unlabored breathing  Abd: Distended, tense, hypoactive bowel sounds. RLQ Ileostomy in place with brown stool, pink healthy appearing stoma.   Skin: warm, perfused, no jaundice  Neuro: AAO x 3, No asterixis  Psych: Normal affect  MSK: Muscle wasting         Data:   Labs and imaging below were independently reviewed and interpreted    BMP  Recent Labs  Lab 02/06/18  1121 02/06/18  0544 02/06/18  0000 02/05/18  2125  02/05/18  0916 01/31/18  0913   * 128* 126* 125*  < > 123* 132*   POTASSIUM  --  3.5  --   --   --  3.5 3.7   CHLORIDE  --  93*  --   --   --  86* 96 "   ANNAMARIE  --  8.4*  --   --   --  8.7 9.8   CO2  --  26  --   --   --  24 23   BUN  --  24  --   --   --  40* 17   CR  --  0.94  --   --   --  1.57* 0.96   GLC  --  94  --   --   --  167* 98   < > = values in this interval not displayed.  CBC  Recent Labs  Lab 02/06/18  0544 02/05/18  0916 01/31/18  0913   WBC 33.2* 36.7* 10.2   RBC 3.28* 3.93* 4.37*   HGB 9.4* 11.4* 12.7*   HCT 27.3* 32.0* 38.5*   MCV 83 81 88   MCH 28.7 29.0 29.1   MCHC 34.4 35.6 33.0   RDW 15.4* 15.5* 15.8*    382 485*     INR  Recent Labs  Lab 02/06/18  1423   INR 2.31*     LFTs  Recent Labs  Lab 02/06/18  0544 02/05/18  0916 01/31/18  0913   ALKPHOS 229* 213* 134   AST 16 18 15   ALT 10 12 14   BILITOTAL 0.9 0.6 0.6   PROTTOTAL 6.6* 8.1 9.0*   ALBUMIN 1.8* 2.5* 3.4      PANCNo lab results found in last 7 days.    IMAGING:  CT Abdomen/Pelvis 2/5/2017:  Abdomen and pelvis: Obstructing mass in the sigmoid colon measuring proximally 5.5 x 4.2 cm in the axial plane (series 2 image 150), which is not significantly changed from 1/26/2018. There is distention of the upstream colon and terminal ileum stump, progressed from 1/26/2018. For example the cecum measures up to 11.6 cm in diameter, previously 7.5 cm. There is a right lower quadrant end ileostomy, without distention of the upstream small bowel loops. There is moderate layering free fluid in the pelvis, increased from 1/26/2018. No free air. No pneumatosis, portal or mesenteric venous gas. Peritoneal soft tissue thickening, most prominent in the anterior midline abdomen  (series 2 image 111), and in the right paracolic gutter (series 2 image 109), is not significantly changed. Presumed hernia mesh repair in the midline anterior abdomen.  The liver, gallbladder, pancreas, spleen, and adrenals are within normal limits. Simple cysts in the right kidney. No hydronephrosis or hydroureter. Bladder is obscured by streak artifact from left hip arthroplasty. Prostate is mildly enlarged. Major  abdominal vasculature is patent. Aorta is normal in caliber. Mildly prominent mesenteric lymph nodes are progressed from 1/26/2018, for example 1.0 cm short axis diameter mesenteric node adjacent to the superior mesenteric vein (series 2 image 80), previously measured 0.6 cm, and 1.1 cm short axis diameter node anterior to the to the inferior vena cava (series 2 image 96), previously measured 0.8 cm.  Lung bases: Linear scarring/atelectasis in both lung bases.  Bones and soft tissues: No acute fracture or suspicious bone lesion.  Total left hip arthroplasty. Presumed degenerative cystic change in the left acetabulum.  IMPRESSION:   1. Obstructing sigmoid colon mass with progression of diffuse colonic distention since 1/26/2018. No evidence of bowel necrosis or perforation.   2. Right lower quadrant end ileostomy, without evidence of obstruction of the upstream small bowel.  3. Mild mesenteric lymphadenopathy, increased from 1/26/2018, may reflect mesenteric congestion.  4. Peritoneal thickening and nodularity is not significantly changed.  5. Increased small ascites.

## 2018-02-06 NOTE — PROGRESS NOTES
CLINICAL NUTRITION SERVICES - REASSESSMENT NOTE     Nutrition Prescription    RECOMMENDATIONS FOR MDs/PROVIDERS TO ORDER:  Per discussion with Provider, MIVF's to be decreased to 75 ml/hr    Malnutrition Status:    Severe malnutrition in the context of acute on chronic illness    Recommendations already ordered by Registered Dietitian (RD):  1. Discontinue Ensure Clear supplement  2.  CPN: 1560 ml: Dextrose 240 g (816 kcals), AA 90 g (360 kcals), 20% lipdis 250 ml to provide 1676 kcals (26 kcals/kg), 1.4 g/kg PRO, 30% cals from fat, GIR 2.56  - start dextrose at 110 g and increase 40-50 g daily if K+, Mg++ WNL and phos is > 2.0 mg/dL     Future/Additional Recommendations:  None at this time     EVALUATION OF THE PROGRESS TOWARD GOALS   Diet: Regular with Ensure Clear supplement betw meals. Diet changed to switched to NPO this am.    Intake: Poor d/t nausea and pain.     NEW FINDINGS   Provider consult for Pharmacy/Nutrition to start & manage PN d/t pt now with bowel obstruction.    Weight: 66.5 kg (today), 65.3 kg (2/5); Suspect increased wt d/t initiation of IVF's yesterday @ 150 ml/hr     Labs:  PO4 2.2 (low) today; Per RN reported, replacement in progress and recheck to be ordered 2 hrs after infusion completed. Due to concern for refeeding with PN start, will need to monitor closely.     MALNUTRITION  % Intake: </=50% for >/= 1 month (severe) - Ongoing  % Weight Loss: > 10% in 6 months (severe) - Ongoing  Subcutaneous Fat Loss: Facial region:  Mild/moderate -Ongoing  Muscle Loss: Facial & jaw region:  Mild/moderate and Thoracic region (clavicle, acromium bone, deltoid, trapezius, pectoral):  Moderate - Ongoing   Fluid Accumulation/Edema: None - Ongoing  Malnutrition Diagnosis: Severe malnutrition in the context of acute on chronic illness    Previous Goals   Patient to consume % of nutritionally adequate meal trays TID, or the equivalent with supplements/snacks.    Evaluation: Not met    Previous Nutrition  Diagnosis  Inadequate oral intake related to nausea, decreased apeptite as evidenced by minimal oral intake, recent wt loss      Evaluation: No longer applicable, nutrition diagnosis changed below    CURRENT NUTRITION DIAGNOSIS  Inadequate protein-energy intake related to altered GI function secondary to bowel obstruction inhibiting po and PN therapy ordered, but not yet initiated as evidenced by diet changed to NPO and no PN intakes received at this time.      INTERVENTIONS  Implementation  Collaboration with Provider regarding plan for PN therapy  Parenteral Nutrition/IV Fluids - Initiate  No Education provided at this time secondary to pt off the floor.    Goals  Total avg nutritional intake to meet a minimum of 25 kcal/kg and 1.2 g PRO/kg daily (per dosing wt 65 kg).    Monitoring/Evaluation  Progress toward goals will be monitored and evaluated per protocol.      Tash Yip RD,LD  Pager [phone]

## 2018-02-06 NOTE — BRIEF OP NOTE
[facility] Brief Operative Note    Pre-operative diagnosis: sigmoid carcinoma   Post-operative diagnosis * No post-op diagnosis entered *   Procedure: Procedure(s):  COMBINED COLONOSCOPY with Colonic Stent Placement - Wound Class: II-Clean Contaminated   Surgeon: Guru Isabela MD   Assistants(s): Dr Rios   Estimated blood loss: None    Specimens: None   Findings:    Sigmoid colon mass completely obstructing the colon at 20 cm  A 25 mm by 10 cm Evolution colonic stent was placed across with adequate decompression  Proximal end of the stent at 15 cm from the anal verge    Recommendations  - Clear liquids tonight  - Inpatient panc-bili consult team to follow

## 2018-02-06 NOTE — PROGRESS NOTES
"Colorectal Surgery     S: Mentation improved with IV fluids. Ongiong well-managed abdominal pain. No vomiting    O:    /66 (BP Location: Left arm)  Pulse 93  Temp 98.1  F (36.7  C) (Oral)  Resp 16  Ht 1.753 m (5' 9\")  Wt 65.3 kg (143 lb 14.4 oz)  SpO2 94%  BMI 21.25 kg/m2   Ill-appearing, tired looking, NAD  NLB on RA  RRR  Abdomen distended, firm, mildly tender  Extremities WWP, no lower extremity edema    Labs:  Pending      A/P: 52 year old with non-resectable diffuse adenocarcinoma involving small bowel, now sigmoid, with carcinomatosis, recently aborted plans for cytoreductive surgery/HIPEC and followed by Palliative Care who presents with abdominal pain, nausea, malaise and diffuse colonic dilation with ongoing stool/gas output from diverting ileostomy who would benefit from palliative decompression.  - Recommend IR consult for cecostomy tube today, NPO for now  - Recommend Palliative Care consult for goals of care/symptomatic management  - No surgical intervention indicated. Call with questions.        Seen and discussed with Dr. Bianchi and plan confirmed with Dr Dudley  "

## 2018-02-06 NOTE — PLAN OF CARE
Problem: Patient Care Overview  Goal: Plan of Care/Patient Progress Review  Outcome: No Change  VSS. Afebrile. Pt. denies nausea, pt. continues on scheduled zofran. Pt. c/o abdominal pain/bloating, oxycodone given x1 and scheduled oxycotin for some relief. Phosphorus replaced for 2.2, scheduled recheck ordered for 1630. If phosphorus is WNL pt. will start TPN tonight. Sodium 128, 500mL bolus NS infusing currently. Pt. NPO for possible cecostomy tube. IR and GI consulted. Voiding spontaneously. Ileostomy patent, putting out liquid/watery stool. Pt. c/o more bloating today from ileostomy. Pt. ambulated in the rivera with PT. Up with assist of 1. Pt. forgetful to call, pt. on bed alarm. No acute events. Pt. rested comfortably between cares.     Addendum: Pt. is going down for a colonoscopy and a stent placement. Pt. has been NPO, but drinking with medications. Pt. received one scrub prior to transport down to the procedure.

## 2018-02-06 NOTE — PLAN OF CARE
Problem: Patient Care Overview  Goal: Plan of Care/Patient Progress Review  Discharge Planner PT   Patient plan for discharge: Home with assist  Current status: PT evaluation completed and treatment initiated.  Per pt he has not been up and moving due to abdominal discomfort/distension.  Pt is IND with bed mobility and transfers, requires SBA with ambulation due to instability, ambulated 125' x 2.  Pt educated on sitting up in chair with all meals and ambulating in rivera with assist from staff 4x/day to increase activity tolerance and stability.  Barriers to return to prior living situation: Stairs, stability with ambulation  Recommendations for discharge: Home with assist from SO  Rationale for recommendations: Pending pt progress with PT and medical status changes       Entered by: Carlie Cowan 02/06/2018 9:01 AM

## 2018-02-06 NOTE — PLAN OF CARE
Problem: Patient Care Overview  Goal: Plan of Care/Patient Progress Review    Tmax 99.9. Hypotensive 104/66. OVSS. Pt has 150cc/hr NS infusing through port. Na recheck at 0000 was 126(125). Slowly trending up. Continue to monitor. N/V controlled with scheduled Zofran. Oxycontin scheduled and pt needed Oxycodone IR 5mg x 1 for abd cramping. C. diff results neg from ileostomy output. Has been NPO except for meds since midnight. Colorectal and IR consults placed. SBA w/ BA on as pt forgets to call on occasion. Continue POC.

## 2018-02-06 NOTE — ANESTHESIA POSTPROCEDURE EVALUATION
Patient: Sebas Lopez    Procedure(s):  COMBINED COLONOSCOPY with Colonic Stent Placement - Wound Class: II-Clean Contaminated    Diagnosis:sigmoid carcinoma  Diagnosis Additional Information: No value filed.    Anesthesia Type:  General, ETT    Note:  Anesthesia Post Evaluation    Patient location during evaluation: PACU  Patient participation: Able to fully participate in evaluation  Level of consciousness: awake and alert  Pain management: adequate  Airway patency: patent  Cardiovascular status: hemodynamically stable  Respiratory status: acceptable  Hydration status: stable  PONV: none     Anesthetic complications: None          Last vitals:  Vitals:    02/06/18 0815 02/06/18 1141 02/06/18 1533   BP: 109/74 97/67 133/74   Pulse: 90 89    Resp: 16 16 18   Temp: 36.2  C (97.1  F) 35.9  C (96.7  F) 37.6  C (99.7  F)   SpO2: 97% 96% 97%         Electronically Signed By: Tyler Livingston MD  February 6, 2018  5:49 PM

## 2018-02-06 NOTE — ANESTHESIA PREPROCEDURE EVALUATION
Anesthesia Evaluation     .             ROS/MED HX    ENT/Pulmonary:  - neg pulmonary ROS     Neurologic:  - neg neurologic ROS     Cardiovascular: Comment: H/O Lyme disease (1990's) requiring pacing x1 day -- resolved.    (+) ----. : . . . :. . 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  S/p 6 cycles of FOLFOX.         Renal/Genitourinary:         Endo:  - neg endo ROS       Psychiatric:     (+) psychiatric history anxiety      Infectious Disease:  - neg infectious disease ROS       Malignancy:   (+) Malignancy History of Other  Other CA Metastatic sigmoid adenomaCA Active status post Chemo and Surgery         Other:                     Physical Exam  Normal systems: cardiovascular, pulmonary and dental    Airway   Mallampati: I  TM distance: >3 FB  Neck ROM: full    Dental     Cardiovascular   Rhythm and rate: regular and normal      Pulmonary    breath sounds clear to auscultation                    Anesthesia Plan      History & Physical Review  History and physical reviewed and following examination; no interval change.    ASA Status:  3 .    NPO Status:  > 8 hours    Plan for General and ETT with Intravenous and Propofol induction. Maintenance will be Balanced.    PONV prophylaxis:  Ondansetron (or other 5HT-3) and Dexamethasone or Solumedrol       Postoperative Care  Postoperative pain management:  IV analgesics.      Consents  Anesthetic plan, risks, benefits and alternatives discussed with:  Patient..                          .

## 2018-02-06 NOTE — CONSULTS
Patient is on IR schedule 2/6/2018 for a Image guided placement of cecostomy tube.   Labs WNL for procedure.   Orders for NPO, scrubs and antibiotics have been entered.   Consent will be done prior to procedure.     [place] the [place] RN at [phone] for estimated time of procedure.     Case discussed with Dr. Dowd from IR and TRACI Suggs from oncology. We are able to place a pigtail drain into the colon. Due to caliber of the drains placed in IR, only liquid is going to drain through this drain. There is risk of clogging with stool if not flushed aggressively to maintain patency. Team and patient will need to understand that this drain will need to remain in place permanently. Removal of the drain would risk chronic fistulization of colon to the skin.     Jayshree Cain DNP, APRN  Interventional Radiology  Phone: [phone]  Pager: [phone]

## 2018-02-06 NOTE — ANESTHESIA CARE TRANSFER NOTE
Patient: Sebas Lopez    Procedure(s):  COMBINED COLONOSCOPY with Colonic Stent Placement - Wound Class: II-Clean Contaminated    Diagnosis: sigmoid carcinoma  Diagnosis Additional Information: No value filed.    Anesthesia Type:   General, ETT     Note:  Airway :Face Mask  Patient transferred to:PACU  Comments: VSS, report to RN Handoff Report: Identifed the Patient, Identified the Reponsible Provider, Reviewed the pertinent medical history, Discussed the surgical course, Reviewed Intra-OP anesthesia mangement and issues during anesthesia, Set expectations for post-procedure period and Allowed opportunity for questions and acknowledgement of understanding      Vitals: (Last set prior to Anesthesia Care Transfer)    CRNA VITALS  2/6/2018 1717 - 2/6/2018 1751      2/6/2018             Pulse: 96    Ht Rate: 99    SpO2: 98 %    Resp Rate (observed): 25                Electronically Signed By: ALEXA Garner CRNA  February 6, 2018  5:51 PM

## 2018-02-06 NOTE — PLAN OF CARE
Problem: Pain, Acute (Adult)  Goal: Identify Related Risk Factors and Signs and Symptoms  Related risk factors and signs and symptoms are identified upon initiation of [employer] Clinical Practice Guideline (CPG).   Outcome: No Change  Afebrile, VSS. C/o abd cramping improved after prn Oxycodone 5 mg x 1. Denied nausea with scheduled Zofran. Ate small bowl of soup broth and jello. Abd CT showed sigmoid colon obstruction. Colorectal surgery and IR consults ordered. Ileostomy intact with 125 cc output. Up with A1 and voiding without difficulty. Sodium 125 at 2130. No acute events. Notify MD with status changes and continue with plan of care.

## 2018-02-07 NOTE — OR NURSING
Pt has done very well.. VSS no abd pain.. Was incont of liquid mucus large amount ..Otherwise fine.. Report callred to 7D Tx via cart per transport..

## 2018-02-07 NOTE — PLAN OF CARE
Problem: Patient Care Overview  Goal: Plan of Care/Patient Progress Review  Outcome: Improving  VSS. Electrolytes double-checked, no replacement needed. Lab placed PIV on L forearm, NS infusing now. TPN and lipids skipped last night d/t low phosphorus, TPN still infusing at 65 mL/hr. On clear liquid diet with no caffeine, has been eating popsicles but nothing else ordered from kitchen. Ileostomy bag emptied once, no BM via rectum since 2/6/18. No urine output yet this shift. Pain is improving, he feels less abdominal distension. Pt has been sitting up in bed, parents visiting.     SN Flor

## 2018-02-07 NOTE — PROGRESS NOTES
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 admitted with generalized weakness, leukocytosis, hyponatremia, abdominal pain and nausea/vomiting.       #Leukocytosis. WBC 36.7, ANC 30.5 on admission. Tachycardic, but not hypotensive on admission.  /86.  As patient is presenting with abdominal pain, concern for intra abdominal infection, given previous CT scans.  Abdominal exam with generalized tenderness on exam, but no peritoneal signs. Chest CT on 2/1 negative for pulmonary infectious process and patient without respiratory symptoms. Lactate 1.0 on admission. CT abd/pelvis 2/5 demonstrates obstructing sigmoid colon mass with progression of diffuse colonic distention since 1/26/2018. No evidence of bowel necrosis or perforation. No intra-abdominal infection noted.   - cdiff negative  - UA negative, culture NGTD   - bcx 2/5 NGTD  - as patient remains afebrile and WBC is trending down will hold off on starting antibiotics at this time.      #PAULINA.  #Hyponatremia.   Cr elevated to 1.57 on admission. Baseline Cr 0.1-1.0.  BUN/Cr ratio elevated. Sodium low at 123 on admission and patient having difficulty with word finding, feeling \"foggy\" on admission. Likely due to hypovolemia, as patient has not been eating/drinking well due to nausea, ileostomy with high volume liquid output.  Sodium and creatinine have improved with IVF.  - UA negative, culture pending.  Urine sodium consistent with hypovolemia.   - cont IVF @ 75mL/hr      # Abdominal Pain, Nausea/vomiting/diarrhea 2/2 Colonic Mass/Colonic distention. Ongoing issue.  Not able to eat/drink more than small sips due to nausea/pain.  Per Dr. Long's last note, CT A/p on 1/26 demonstrates stable disease, does not appear to be obstruction " proximal to ileostomy. Patient reports he has been trying to take antiemetics scheduled at home, but continues to have nausea and bloating with with PO intake.  Not vomiting. Has ongoing high volume output from ileostomy. No blood in stools. Per clinic notes, there has been consideration of placing venting G tube or starting patient on TPN outpatient -- may need to consider if pt continues to have nausea with PO intake. Of note, patient does follow with palliative care outpatient. CT abd/pelvis 2/5 demonstrates obstructing sigmoid colon mass with progression of diffuse colonic distention since 1/26/2018. No evidence of bowel necrosis or perforation.   - schedule zofran q8h, compazine, ativan prn for nausea management   - MIVF as above   - scheduled oxycontin 10mg BID -- may need to consider increasing if pain is uncontrolled  - oxycodone 5-10 mg q4h prn for pain control  -  Nutrition consulted  - hold octreotide at this time   - will plan to start TPN/lipids while inpatient -- pharmacy/nutrition to dose . Pt may need PEG tube eventually, would be candidate for TF, though will defer this decision to outpatient team.   - consulted colorectal surgery who recommend cecostomy placement in IR to remove some of the fluid in cecum that may be contributing to symptoms. GI placed colonic stent on 2/6.  Symptoms have much improved today.    - IR consulted for cecostomy placement and/or venting G tube placement.  PEG may be needed eventually, but not likely to help symptoms at this time.       # Stage IV Sigmoid Adenocarcinoma with peritoneal carcinomatosis.  S/p ex lap 7/2017 with small bowel resection and end ileostomy. S/p 6 cycles of FOLFOX. Admitted on 12/6/2017 for ex-lap with aborted HIPEC due to diffuse carcinomatosis. Follows with Dr. Long outpatient. Next generation sequencing--no mutations were identified in analyzed regions of KRAS, NRAS, BRAF, HRAS or PIK3CA. Therefore, this patient may be eligible for anti-EGFR  antibody therapy if clinically indicated .  MSI testing with no microsatellite instability. CT A/P on 1/26 with 5.2 x 4.5 x 3.6cm proximal sigmoid mass, causing colonic obstruction with significant distention of cecum (7cm in diameter), ascending colon and proximal transverse colon.  No pneumatosis or pneumoperitoneum . also small volume ascites. No surgical intervention per Dr. Dudley (surg onc)   - will need to f/u with Dr. Long for further treatment plan   - consider palliative care consult for symptom management     # Anemia of chronic disease.   Pt with Hgb of 11.4 on admission. Has previously been on IV/PO iron supplementation, not currently taking. Stable. No evidence of bleeding.   - monitor, transfuse to keep Hgb > 7.0      # Generalized weakness 2/2 malignancy, possible infection, poor nutrition. Pt reports weakness has become progressive over past couple of weeks, has noted it more as he has been not eating or drinking as much.   - PT/OT consult   - nutrition consult     # Severe malnutrition in the context of acute on chronic illness. Evidenced by poor oral intake <50% of TID meals, >10% weight loss in 6 months.    - encourage regular diet as tolerated   - start TPN/lipids, monitor over 24-48 hours, as patient is at risk for refeeding syndrome   - replace lytes prn      FEN  - regular diet as tolerated, TPN    - lyte replacement prn   - MIVF @ 75mL/hr   - nutrition consult as above    PPx:   GI: prilosec 20mg daily   VTE: lovenox 40mg daily SQ ppx   Code status: full code      Dispo/follow up: Anticipate d/c in next 1-2 days, pending symptoms have resolved, if tolerating TPN.     Above discussed with staff attending, Dr. Finch.      Es Dumont, DNP, APRN, CNP  Hematology/oncology  2106        Interval History   No acute events overnight.  Remains afebrile. Feeling much better today, reports nausea/vomiting has improved, abdominal pain and distention has significantly improved. No chills,  body aches, cough, sinus congestion, chest pain or SOB.  Had increased output from ileostomy overnight after procedure, now feeling better.        Physical Exam   Temp: 97.4  F (36.3  C) Temp src: Oral BP: 93/57 Pulse: 85 Heart Rate: 75 Resp: 20 SpO2: 96 % O2 Device: None (Room air)    Vitals:    02/05/18 1124 02/06/18 0824 02/07/18 1122   Weight: 65.3 kg (143 lb 14.4 oz) 66.5 kg (146 lb 8 oz) 64.4 kg (141 lb 14.4 oz)     Vital Signs with Ranges  Temp:  [96.8  F (36  C)-100  F (37.8  C)] 97.4  F (36.3  C)  Pulse:  [77-87] 85  Heart Rate:  [] 75  Resp:  [16-20] 20  BP: ()/(54-78) 93/57  SpO2:  [93 %-100 %] 96 %  I/O last 3 completed shifts:  In: 2983.62 [P.O.:720; I.V.:1857.5]  Out: 1360 [Urine:455; Stool:900; Blood:5]    Constitutional: awake, alert, pleasant, chronically ill appearing male, in NAD   ENT: OP is dry, pink, without evidence of thrush or mucositis.  Pupils are round,equal in size, reactive to light.   Respiratory: LS CTA, no wheezes or crackles. Breathing comfortably on room air.   Cardiovascular: HRR, no murmur, no rub. No edema.   GI: abd Is significantly less distended today than yesterday, abd is soft, non tender.  Bowel sounds normal. Ileostomy intact in RLQ, stoma pink, non erythematous, ostomy with liquid yellow-brown output.   Lymph/Hematologic: no s/s bleeding  Or cervical lympadenapthy.   Skin: dry and intact, without evidence of rash or lesions on visible skin   Musculoskeletal:  Appears frail, with poor muscle tone, though moves all extremities spontaneously in bed.  No evidence of tenderness or erythema.    Neurologic: A&O x4, Cranial nerves II-XII are grossly intact.    Neuropsychiatric: affect calm, pleasant.     Medications     NaCl 75 mL/hr at 02/07/18 0354     IV fluid REPLACEMENT ONLY       parenteral nutrition - ADULT compounded formula 65 mL/hr at 02/07/18 1015     - MEDICATION INSTRUCTIONS -       - MEDICATION INSTRUCTIONS -         enoxaparin  40 mg Subcutaneous Q24H      lipids  250 mL Intravenous Q24H     omeprazole  20 mg Oral Daily     oxyCODONE  10 mg Oral Q12H     ondansetron  8 mg Intravenous Q8H    Or     ondansetron  8 mg Oral Q8H    Or     ondansetron  8 mg Oral Q8H     sodium chloride (PF)  3 mL Intracatheter Q8H       Data   Results for orders placed or performed during the hospital encounter of 02/05/18 (from the past 24 hour(s))   Glucose by meter   Result Value Ref Range    Glucose 87 70 - 99 mg/dL   COLONOSCOPY   Result Value Ref Range    COLONOSCOPY       [address] [place] [address] [phone]     Endoscopy Department  _______________________________________________________________________________  Patient Name: Sebas Lopez            Procedure Date: 2/6/2018 4:21 PM  MRN: [id number]                       Account Number: [id number]  YOB: 1965              Admit Type: Inpatient  Age: 52                                Gender: Male  Note Status: Finalized                Attending MD: Guru Mar ,   Total Sedation Time:                    _______________________________________________________________________________     Procedure:           Colonoscopy  Indications:         Colonic stent placement                       52 year old white Â male with history of ulcerative                        colitis with a diagnosis of Stage IV Sigmoid                        Adenocarcinoma with peritoneal carcinomatosis admitted                        with generalized weakness, leukocytosis , hyponatremia,                        abdominal pain and nausea/vomiting with CT showing an                        obstructing sigmoid colonic mass with progression of                        diffuse colonic distention. Colonoscopy under fluroscopy                        with plans to perform palliative colonic stenting across                        the obstructing mass  Providers:           Alexis Nazario,  MD  Referring MD:        Rob Dudley MD  Medicines:           General Anesthesia  Complications:       No immediate complications.  _______________________________________________________________________________  Procedure:           Pre-Anesthesia Assessment:                       - Prior to the procedure, a History and Physical was                        performed, and patient medications and allergies were                        reviewed. The patient is competent. The risks and                        benefits of the procedure and the sedation opti ons and                        risks were discussed with the patient. All questions                        were answered and informed consent was obtained. Patient                        identification and proposed procedure were verified by                        the physician and the nurse in the pre-procedure area.                        Mental Status Examination: alert and oriented. Airway                        Examination: normal oropharyngeal airway and neck                        mobility. Respiratory Examination: clear to                        auscultation. CV Examination: normal. Prophylactic                        Antibiotics: The patient does not require prophylactic                        antibiotics. Prior Anticoagulants: The patient has taken                        no previous anticoagulant or antiplatelet agents. ASA                        Grade Assessment: III - A patient with severe systemic                        disease. After reviewing the risks and benefits, the                         patient was deemed in satisfactory condition to undergo                        the procedure. The anesthesia plan was to use general                        anesthesia. Immediately prior to administration of                        medications, the patient was re-assessed for adequacy to                        receive sedatives. The heart rate, respiratory  rate,                        oxygen saturations, blood pressure, adequacy of                        pulmonary ventilation, and response to care were                        monitored throughout the procedure. The physical status                        of the patient was re-assessed after the procedure.                       After obtaining informed consent, the colonoscope was                        passed under direct vision. Throughout the procedure,                        the patient's blood pressure, pulse, and oxygen                        saturations were monitored continuously. The Endoscope                         was introduced through the anus and advanced to the                        sigmoid colon to examine a mass. This was the intended                        extent. The colonoscopy was performed without                        difficulty. The patient tolerated the procedure well.                                                                                   Findings:       The perianal examination was normal.       The single channel upper endoscope was advanced to the level of the mass        lesion. A fungating and infiltrative completely obstructing large mass        was found in the sigmoid colon at 20 cm from the anal verge. The mass        was circumferential (involving 100% of the lumen circumference). No        bleeding was present. A 0.025inch Visiglide wire was advanced through a        9-12 mm stone extraction balloon catheter under fluoroscopic guidance        through the stenotic lumen. The catheter was advanced over the wire and        contrast was injected. The krissy notic area was 2 to 3 cm in length. A 25mm        by 10 cm Evolution colonic stent was placed under endoscopic and        fluoroscopic guidance. Liquid stool was seen flowing through the stent.        The proximal end of the stent was at 15 cm in the rectosigmoid postion.        The stent was in good position.                                                                                    Impression:          - Malignant completely obstructing tumor in the sigmoid                        colon consistent with adenocarcinoma and a 2- 3 cm                        stricture as visualized on CT scan                       - Uncomplicated placement of 25 mm by 10 cm Evolution                        colonic stent across the obstructing sigmoid mass with                        passage of foul smelling liquid stools  Recommendation:      - [facility] care.                       - Patient has been warned of post procedure                        complications incl uding stent migration and perforation.                        He also understands that this procedure was palliative.                       - Inpatient panc-bili consult to follow patient                                                                                     ____________________  Guru Mar,   2/7/2018 8:11:46 AM    _____________________  Alexis Rios MD  Number of Addenda: 0    Note Initiated On: 2/6/2018 4:21 PM  Scope In:  Scope Out:     XR Surgery TALA L/T 5 Min Fluoro w Stills    Narrative    This exam was marked as non-reportable because it will not be read by a   radiologist or a [place] non-radiologist provider.             Sodium   Result Value Ref Range    Sodium 129 (L) 133 - 144 mmol/L   Phosphorus   Result Value Ref Range    Phosphorus 1.9 (L) 2.5 - 4.5 mg/dL   Lactic acid level STAT for sepsis protocol   Result Value Ref Range    Lactate for Sepsis Protocol 0.6 0.4 - 1.9 mmol/L   Sodium   Result Value Ref Range    Sodium 131 (L) 133 - 144 mmol/L   Basic metabolic panel   Result Value Ref Range    Sodium 134 133 - 144 mmol/L    Potassium 3.7 3.4 - 5.3 mmol/L    Chloride 100 94 - 109 mmol/L    Carbon Dioxide 25 20 - 32 mmol/L    Anion Gap 9 3 - 14 mmol/L    Glucose 94 70 - 99 mg/dL    Urea Nitrogen 12 7 - 30 mg/dL     Creatinine 0.74 0.66 - 1.25 mg/dL    GFR Estimate >90 >60 mL/min/1.7m2    GFR Estimate If Black >90 >60 mL/min/1.7m2    Calcium 7.8 (L) 8.5 - 10.1 mg/dL   Magnesium   Result Value Ref Range    Magnesium 1.8 1.6 - 2.3 mg/dL   Phosphorus   Result Value Ref Range    Phosphorus 3.6 2.5 - 4.5 mg/dL   Prealbumin   Result Value Ref Range    Prealbumin 4 (L) 15 - 45 mg/dL   Triglycerides   Result Value Ref Range    Triglycerides 160 (H) <150 mg/dL   Phosphorus   Result Value Ref Range    Phosphorus 2.6 2.5 - 4.5 mg/dL   CBC with platelets differential   Result Value Ref Range    WBC 22.8 (H) 4.0 - 11.0 10e9/L    RBC Count 3.02 (L) 4.4 - 5.9 10e12/L    Hemoglobin 8.5 (L) 13.3 - 17.7 g/dL    Hematocrit 24.4 (L) 40.0 - 53.0 %    MCV 81 78 - 100 fl    MCH 28.1 26.5 - 33.0 pg    MCHC 34.8 31.5 - 36.5 g/dL    RDW 16.1 (H) 10.0 - 15.0 %    Platelet Count 331 150 - 450 10e9/L    Diff Method Manual Differential     % Neutrophils 92.9 %    % Lymphocytes 4.4 %    % Monocytes 1.8 %    % Eosinophils 0.0 %    % Basophils 0.0 %    % Metamyelocytes 0.9 %    Absolute Neutrophil 21.2 (H) 1.6 - 8.3 10e9/L    Absolute Lymphocytes 1.0 0.8 - 5.3 10e9/L    Absolute Monocytes 0.4 0.0 - 1.3 10e9/L    Absolute Eosinophils 0.0 0.0 - 0.7 10e9/L    Absolute Basophils 0.0 0.0 - 0.2 10e9/L    Absolute Metamyelocytes 0.2 (H) 0 10e9/L    Anisocytosis Slight     Poikilocytosis Moderate     Target Cells Moderate     Platelet Estimate Confirming automated cell count    INR   Result Value Ref Range    INR 2.14 (H) 0.86 - 1.14   Sodium   Result Value Ref Range    Sodium 133 133 - 144 mmol/L       Recent Labs  Lab 02/07/18  1147 02/07/18  1000 02/07/18  0830 02/07/18  0546 02/06/18  2358  02/06/18  1423  02/06/18  0544  02/05/18  0916   WBC  --   --  22.8*  --   --   --   --   --  33.2*  --  36.7*   HGB  --   --  8.5*  --   --   --   --   --  9.4*  --  11.4*   MCV  --   --  81  --   --   --   --   --  83  --  81   PLT  --   --  331  --   --   --   --   --  341   --  382   INR  --  2.14*  --   --   --   --  2.31*  --   --   --   --      --   --  134 131*  < >  --   < > 128*  < > 123*   POTASSIUM  --   --   --  3.7  --   --   --   --  3.5  --  3.5   CHLORIDE  --   --   --  100  --   --   --   --  93*  --  86*   CO2  --   --   --  25  --   --   --   --  26  --  24   BUN  --   --   --  12  --   --   --   --  24  --  40*   CR  --   --   --  0.74  --   --   --   --  0.94  --  1.57*   ANIONGAP  --   --   --  9  --   --   --   --  9  --  12   ANNAMARIE  --   --   --  7.8*  --   --   --   --  8.4*  --  8.7   GLC  --   --   --  94  --   --   --   --  94  --  167*   ALBUMIN  --   --   --   --   --   --   --   --  1.8*  --  2.5*   PROTTOTAL  --   --   --   --   --   --   --   --  6.6*  --  8.1   BILITOTAL  --   --   --   --   --   --   --   --  0.9  --  0.6   ALKPHOS  --   --   --   --   --   --   --   --  229*  --  213*   ALT  --   --   --   --   --   --   --   --  10  --  12   AST  --   --   --   --   --   --   --   --  16  --  18   < > = values in this interval not displayed.  Recent Results (from the past 24 hour(s))   XR Surgery TALA L/T 5 Min Fluoro w Stills    Narrative    This exam was marked as non-reportable because it will not be read by a   radiologist or a [place] non-radiologist provider.

## 2018-02-07 NOTE — PROGRESS NOTES
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 Gastroenterology team has been consulted to discuss the utility of colonic stenting to help relieve his malignant obstruction.  S/p colonoscopy 2/6/18 with a sigmoid colonic mass completely obstructing the colon at 20 cm. A 25 mm x 10 cc evolution colonic stent was placed across with adequate decompression. Proximal end of the stent at 15 cm from the anal verge.    Plan:  - No follow up needed from a gastroenterology perspective.  - Patent educated about signs/symptoms to watch out for possible complications including stent migration, infection, bleeding and perforation in which case an urgent ED evaluation will be indicated.   - Management of the metastatic malignant tumor as per oncology team.   - Symptom management as per primary team.    Gastroenterology team will will sign off. Thank you for involving us in this patient's care. Please do not hesitate to contact the GI service with any questions or concerns.   Patient care plan has been discussed with Dr. Richardson, GI staff physician.     Jonatan Marquez  Pager: [phone]    Interval History:  - S/p colonoscopy 2/6/18 with a sigmoid colonic mass completely obstructing the colon at 20 cm. A 25 mm x 10 cc evolution colonic stent was placed across with adequate decompression. Proximal end of the stent at 15 cm from the anal verge.  - Pt reports improvement in his pain this morning and improvement in his abdominal distention.    Physical Exam:  BP 93/57  Pulse 85  Temp 97.4  F (36.3  C) (Oral)  Resp 20  Ht 1.753 m (5' 9\")  Wt 64.4 kg (141 lb 14.4 " oz)  SpO2 96%  BMI 20.95 kg/m2  Wt:   Wt Readings from Last 2 Encounters:   02/07/18 64.4 kg (141 lb 14.4 oz)   02/05/18 66.5 kg (146 lb 11.2 oz)      Constitutional: Middle aged frail gentleman, cooperative, pleasant, not dyspneic/diaphoretic, no acute distress  Eyes: Sclera anicteric/injected  Ears/nose/mouth/throat: Normal oropharynx without ulcers or exudate, mucus membranes moist, hearing intact  Neck: supple, thyroid normal size  CV: No edema  Respiratory: Unlabored breathing  Abd: Distended, tense, hypoactive bowel sounds. RLQ Ileostomy in place with brown stool, pink healthy appearing stoma.   Skin: warm, perfused, no jaundice  Neuro: AAO x 3, No asterixis  Psych: Normal affect  MSK: Muscle wasting    Data:  All laboratory data reviewed  Labs and imaging below were independently reviewed and interpreted    BMP  Recent Labs  Lab 02/07/18  0546 02/06/18  2358 02/06/18  1950 02/06/18  1121 02/06/18  0544  02/05/18  0916    131* 129* 128* 128*  < > 123*   POTASSIUM 3.7  --   --   --  3.5  --  3.5   CHLORIDE 100  --   --   --  93*  --  86*   ANNAMARIE 7.8*  --   --   --  8.4*  --  8.7   CO2 25  --   --   --  26  --  24   BUN 12  --   --   --  24  --  40*   CR 0.74  --   --   --  0.94  --  1.57*   GLC 94  --   --   --  94  --  167*   < > = values in this interval not displayed.  CBC  Recent Labs  Lab 02/07/18  0830 02/06/18  0544 02/05/18  0916   WBC 22.8* 33.2* 36.7*   RBC 3.02* 3.28* 3.93*   HGB 8.5* 9.4* 11.4*   HCT 24.4* 27.3* 32.0*   MCV 81 83 81   MCH 28.1 28.7 29.0   MCHC 34.8 34.4 35.6   RDW 16.1* 15.4* 15.5*    341 382     INR  Recent Labs  Lab 02/07/18  1000 02/06/18  1423   INR 2.14* 2.31*     LFTs  Recent Labs  Lab 02/06/18  0544 02/05/18  0916   ALKPHOS 229* 213*   AST 16 18   ALT 10 12   BILITOTAL 0.9 0.6   PROTTOTAL 6.6* 8.1   ALBUMIN 1.8* 2.5*      PANCNo lab results found in last 7 days.    All imaging studies reviewed by me.  CT Abdomen/Pelvis 2/5/2017:  Abdomen and pelvis: Obstructing  mass in the sigmoid colon measuring proximally 5.5 x 4.2 cm in the axial plane (series 2 image 150), which is not significantly changed from 1/26/2018. There is distention of the upstream colon and terminal ileum stump, progressed from 1/26/2018. For example the cecum measures up to 11.6 cm in diameter, previously 7.5 cm. There is a right lower quadrant end ileostomy, without distention of the upstream small bowel loops. There is moderate layering free fluid in the pelvis, increased from 1/26/2018. No free air. No pneumatosis, portal or mesenteric venous gas. Peritoneal soft tissue thickening, most prominent in the anterior midline abdomen  (series 2 image 111), and in the right paracolic gutter (series 2 image 109), is not significantly changed. Presumed hernia mesh repair in the midline anterior abdomen.  The liver, gallbladder, pancreas, spleen, and adrenals are within normal limits. Simple cysts in the right kidney. No hydronephrosis or hydroureter. Bladder is obscured by streak artifact from left hip arthroplasty. Prostate is mildly enlarged. Major abdominal vasculature is patent. Aorta is normal in caliber. Mildly prominent mesenteric lymph nodes are progressed from 1/26/2018, for example 1.0 cm short axis diameter mesenteric node adjacent to the superior mesenteric vein (series 2 image 80), previously measured 0.6 cm, and 1.1 cm short axis diameter node anterior to the to the inferior vena cava (series 2 image 96), previously measured 0.8 cm.  Lung bases: Linear scarring/atelectasis in both lung bases.  Bones and soft tissues: No acute fracture or suspicious bone lesion.  Total left hip arthroplasty. Presumed degenerative cystic change in the left acetabulum.  IMPRESSION:   1. Obstructing sigmoid colon mass with progression of diffuse colonic distention since 1/26/2018. No evidence of bowel necrosis or perforation.   2. Right lower quadrant end ileostomy, without evidence of obstruction of the upstream small  bowel.  3. Mild mesenteric lymphadenopathy, increased from 1/26/2018, may reflect mesenteric congestion.  4. Peritoneal thickening and nodularity is not significantly changed.  5. Increased small ascites.     Jonatan Marquez  Gastroenterology Fellow

## 2018-02-07 NOTE — PLAN OF CARE
Problem: Pain, Acute (Adult)  Goal: Identify Related Risk Factors and Signs and Symptoms  Related risk factors and signs and symptoms are identified upon initiation of Human Response Clinical Practice Guideline (CPG).   Outcome: No Change  Pt returned to 7D from PACU at 1930 post colonoscopy with colonic stent. Afebrile, VSS. A&O. Pt's abd appears less distended. Pt had large watery, mucous type BM and reported significant relief of abd cramping afterwards. Tolerating clear liquids with no reports of nausea. Up ambulating to bathroom with A1 and voiding without difficulty. Phosphorous recheck 1.9. Awaiting 20 mmol Phosphorous replacement from pharmacy. Per pharmacist, will need phosphorous >2 before initiating TPN. Notify MD with status changes and continue with plan of care.

## 2018-02-07 NOTE — PLAN OF CARE
Problem: Patient Care Overview  Goal: Plan of Care/Patient Progress Review  Outcome: No Change  VSS. Afebrile. Pt. c/o abdominal pain, oxycodone given x2 for relief. Pt. c/o nausea, scheduled Zofran given x1. No emesis. AM phosphorus recheck 2.6. Continuous TPN and lipids started through port. BG at 1400 134. Next BG due at 2000. Pt. tolerating clear liquid diet. Voiding spontaneously. Pt. c/o urinary retention. Pt. bladder scanned for 924. Pt. voided 450mL and post bladder scan was 276. Will continue to reassess. Ileostomy patent and intact, pt. put out 600mL of liquid loose stool throughout the shift. Pt. had two loose BM's through rectum. No acute events. Pt. rested comfortably between cares. Family at bedside. Will continue POC and notify MD with changes.

## 2018-02-07 NOTE — PLAN OF CARE
"Problem: Patient Care Overview  Goal: Plan of Care/Patient Progress Review  Outcome: No Change  BP (!) 82/54 (BP Location: Left arm)  Pulse 87  Temp 97.5  F (36.4  C) (Oral)  Resp 18  Ht 1.753 m (5' 9\")  Wt 66.5 kg (146 lb 8 oz)  SpO2 97%  BMI 21.63 kg/m2. Soft BPs 80's-100/50's-60's, MD notified & order 500cc NS bolus over 2 hours.  Pt. denies pain or nausea.  Pt. up with SBA, voided but not saved & reports 1 large loose brown stool.  Ileostomy with small amount liquid stool.  NS at 75cc/hour via [place]-cath.  Phos. 1.9 & replaced with KPhos 20mmol x1, next recheck with am labs. Once phos. >2.0, start TPN per pharmacy. Pt. slept well between cares.  Bed alarm on for pt. safety.  Continue to monitor per POC.       "

## 2018-02-08 NOTE — PROGRESS NOTES
Nutrition Brief:    Per interdisciplinary rounds this am, informed by Provider that plan is to discharge pt home with PN.   PN ordered on 2/6, but did not get initiated until yesterday d/t hypophosphatemia (1.9 on 2/6 and currently 2.4 today).   Pt's diet has also adv from NPO to CLs on 2/6 to FL diet this morning. Pt reports tolerating small amounts for breakfast.    Intervention:  1. Collaborated with Pharm D regarding adv dextrose in PN to 140 g (476 kcals) in next PN bag.   2. Educated pt on plan for home PN. Also provided education on foods allowed on a FL diet. Pt receptive to information provided.    RD to continue to follow for diet adv, in addition to  monitoring and managing PN adv with Pharm D.    Tash Yip RD,LD  Pager [phone]

## 2018-02-08 NOTE — PLAN OF CARE
Problem: Patient Care Overview  Goal: Plan of Care/Patient Progress Review  VSS. Oxycodone x 1 for abdominal discomfort with good relief. Colostomy with liquid output, having also having watery BMs leaking through rectum d/t colonoscopy. Sleeping most of the night. TPN/lipids into Port. BG check at 0200 103. Plan; d/c home within 1-2 days.

## 2018-02-08 NOTE — PLAN OF CARE
Problem: Patient Care Overview  Goal: Plan of Care/Patient Progress Review    Soft pressures. 94/46. MD aware. Continue to monitor. OVSS. Pt denies N/V. C/O abd cramping x 1. Oxycodone 5mg given w/ relief. TPN/Lipids running through port. Fluids 75cc/hr through PIV. Pt voided once at beginning of shift and didn't save. Bladder scan 79mL afterward. Has had liquid stool via rectum x 3 this shift in addition to 1150cc liquid stool output from ileostomy. Team aware. Pt needs continuing education on saving urine/stool.  at 2000; next due 0200. Clear liquid diet. Will continue to monitor and proceed w/ POC.

## 2018-02-08 NOTE — PROGRESS NOTES
Care Coordinator- Discharge Planning     Admission Date/Time:  2/5/2018  Attending MD:  Tammy Palacio MD     Data  Date of initial CC assessment:  2/5  Chart reviewed, discussed with interdisciplinary team.   Patient was admitted for:   1. Large bowel obstruction         Assessment  Concerns with insurance coverage for discharge needs: None.  Current Living Situation: Patient lives with family.  Support System: Supportive  Services Involved: Home Infusion  Transportation: Family or Friend will provide  Barriers to Discharge: none      Coordination of Care and Referrals: Provided patient/family with options for Home Infusion.  Anticipate d/c home with TPN initiated this hospitalization.  Telephone referral given to [facility] and AVS updated.  Epic referral entered for PLC.  Cont to monitor.      Plan  Anticipated Discharge Date:  1-2 days  Anticipated Discharge Plan:  home    CTS Handoff completed:  At d/c.    Karina Menon RN   ________________________________________  Karina Menon RN, BSN    7D/Oncology Care Coordinator  Pager  [phone]  Phone [phone]

## 2018-02-08 NOTE — PLAN OF CARE
Problem: Patient Care Overview  Goal: Plan of Care/Patient Progress Review  Outcome: No Change  VSS. Afebrile. Pt. c/o abdominal discomfort, oxycodone given x2 and scheduled oxycotin given for relief. Pt. denies nausea, pt. continues on scheduled zofran. Fair appetite, pt. ate 25% of pudding and cream of wheat this morning. TPN in the process of being made. D10 infusing at 65mL/hr. Lipids currently infusing. . Next BG due at 1930. Voiding spontaneously. Ileostomy patent putting out moderate amounts of loose, liquid brown stool. No BM from rectum. Up with PT ambulating in halls. No acute events. Pt. rested comfortably between cares. Pt. will discharge tomorrow with home TPN. Will continue POC and notify MD with changes.     Addendum: Phosphorus replaced for 2.4. Recheck in the AM.

## 2018-02-08 NOTE — PROGRESS NOTES
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 admitted with generalized weakness, leukocytosis, hyponatremia, abdominal pain and nausea/vomiting.       #Leukocytosis. IMPROVED. WBC 36.7, ANC 30.5 on admission. Tachycardic, but not hypotensive on admission.  /86.  As patient is presenting with abdominal pain, concern for intra abdominal infection, given previous CT scans.  Abdominal exam with generalized tenderness on exam, but no peritoneal signs. Chest CT on 2/1 negative for pulmonary infectious process and patient without respiratory symptoms. Lactate 1.0 on admission. CT abd/pelvis 2/5 demonstrates obstructing sigmoid colon mass with progression of diffuse colonic distention since 1/26/2018. No evidence of bowel necrosis or perforation. No intra-abdominal infection noted.   - cdiff negative  - UA negative, culture NGTD   - bcx 2/5 NGTD  - as patient remains afebrile and WBC is trending down will hold off on starting antibiotics at this time.      #PAULINA.  #Hyponatremia.   Cr elevated to 1.57 on admission. Baseline Cr 0.1-1.0.  BUN/Cr ratio elevated. Sodium low at 123 on admission and patient having difficulty with word finding, feeling \"foggy\" on admission. Likely due to hypovolemia, as patient has not been eating/drinking well due to nausea, ileostomy with high volume liquid output.  Sodium and creatinine have improved with IVF.  - UA negative, culture pending.  Urine sodium consistent with hypovolemia.   - cont IVF @ 75mL/hr      # Abdominal Pain, Nausea/vomiting/diarrhea 2/2 Colonic Mass/Colonic distention. Ongoing issue.  Not able to eat/drink more than small sips due to nausea/pain.  Per Dr. Long's last note, CT A/p on 1/26 demonstrates stable disease, does not appear to be " obstruction proximal to ileostomy. Patient reports he has been trying to take antiemetics scheduled at home, but continues to have nausea and bloating with with PO intake.  Not vomiting. Has ongoing high volume output from ileostomy. No blood in stools. Per clinic notes, there has been consideration of placing venting G tube or starting patient on TPN outpatient -- may need to consider if pt continues to have nausea with PO intake. Of note, patient does follow with palliative care outpatient. CT abd/pelvis 2/5 demonstrates obstructing sigmoid colon mass with progression of diffuse colonic distention since 1/26/2018. No evidence of bowel necrosis or perforation.   - schedule zofran q8h, compazine, ativan prn for nausea management   - MIVF as above   - scheduled oxycontin 10mg BID -- may need to consider increasing if pain is uncontrolled  - oxycodone 5-10 mg q4h prn for pain control  -  Nutrition consulted  - hold octreotide at this time   - will plan to start TPN/lipids while inpatient -- pharmacy/nutrition to dose. Monitor for refeeding syndrome.  . Pt may need PEG tube eventually, would be candidate for TF, though will defer this decision to outpatient team.   - consulted colorectal surgery who recommend cecostomy placement in IR to remove some of the fluid in cecum that may be contributing to symptoms. GI placed colonic stent on 2/6.  Symptoms have much improved after stent placement. .    - IR consulted for cecostomy placement and/or venting G tube placement.  PEG may be needed eventually, but not likely to help symptoms at this time.         # Stage IV Sigmoid Adenocarcinoma with peritoneal carcinomatosis.  S/p ex lap 7/2017 with small bowel resection and end ileostomy. S/p 6 cycles of FOLFOX. Admitted on 12/6/2017 for ex-lap with aborted HIPEC due to diffuse carcinomatosis. Follows with Dr. Long outpatient. Next generation sequencing--no mutations were identified in analyzed regions of KRAS, NRAS, BRAF, HRAS  or PIK3CA. Therefore, this patient may be eligible for anti-EGFR antibody therapy if clinically indicated .  MSI testing with no microsatellite instability. CT A/P on 1/26 with 5.2 x 4.5 x 3.6cm proximal sigmoid mass, causing colonic obstruction with significant distention of cecum (7cm in diameter), ascending colon and proximal transverse colon.  No pneumatosis or pneumoperitoneum . also small volume ascites. No surgical intervention per Dr. Dudley (surg onc)   - will need to f/u with Dr. Long for further treatment plan   - consider palliative care consult for symptom management     # Anemia of chronic disease.   Pt with Hgb of 11.4 on admission. Has previously been on IV/PO iron supplementation, not currently taking. Stable. No evidence of bleeding.   - monitor, transfuse to keep Hgb > 7.0      # Generalized weakness 2/2 malignancy, possible infection, poor nutrition. Pt reports weakness has become progressive over past couple of weeks, has noted it more as he has been not eating or drinking as much.   - PT/OT consult   - nutrition consult     # Severe malnutrition in the context of acute on chronic illness. Evidenced by poor oral intake <50% of TID meals, >10% weight loss in 6 months.    - encourage regular diet as tolerated   - start TPN/lipids, monitor over 24-48 hours, as patient is at risk for refeeding syndrome   - replace lytes prn      FEN  - regular diet as tolerated, TPN    - lyte replacement prn   - MIVF @ 75mL/hr   - nutrition consult as above    PPx:   GI: prilosec 20mg daily   VTE: lovenox 40mg daily SQ ppx   Code status: full code      Dispo/follow up: likely d/c tomorrow (2/9), pt will need PLC teaching prior to d/c for TPN.  Will monitor for additional 24 hours on TPN 2/2 risk for refeeding.  Above discussed with staff attending, Dr. Finch.      Es Dumont, DNP, APRN, CNP  Hematology/oncology  [id number]        Interval History   No acute events overnight.  Remains afebrile. Has mild  abdominal pain, relieved with oxycodone.  Denies nausea/vomiting.  Able to tolerate clear liquids yesterday, wants to try to advance diet today. Had multiple stools yesterday, but feels better with increased output. Less distended and bloated. Good UOP.  Plans to try to walk today in halls with nursing staff.       Physical Exam   Temp: 97.2  F (36.2  C) Temp src: Oral BP: 94/62 Pulse: 75 Heart Rate: 74 Resp: 18 SpO2: 97 % O2 Device: None (Room air)    Vitals:    02/06/18 0824 02/07/18 1122 02/08/18 0822   Weight: 66.5 kg (146 lb 8 oz) 64.4 kg (141 lb 14.4 oz) 63.6 kg (140 lb 3.2 oz)     Vital Signs with Ranges  Temp:  [95.9  F (35.5  C)-98.3  F (36.8  C)] 97.2  F (36.2  C)  Pulse:  [75] 75  Heart Rate:  [70-88] 74  Resp:  [18-20] 18  BP: (93-97)/(46-62) 94/62  SpO2:  [97 %-99 %] 97 %  I/O last 3 completed shifts:  In: 5476.12 [P.O.:2000; I.V.:1900]  Out: 3180 [Urine:1255; Stool:1925]    Constitutional: awake, alert, pleasant, chronically ill appearing male, in NAD   ENT: OP is dry, pink, without evidence of thrush or mucositis.  Pupils are round,equal in size, reactive to light.   Respiratory: LS CTA, no wheezes or crackles. Breathing comfortably on room air.   Cardiovascular: HRR, no murmur, no rub. No edema.   GI: abd Is significantly less distended today than yesterday, abd is soft, non tender.  Bowel sounds normal. Ileostomy intact in RLQ, stoma pink, non erythematous, ostomy with liquid yellow-brown output.   Lymph/Hematologic: no s/s bleeding  Or cervical lympadenapthy.   Skin: dry and intact, without evidence of rash or lesions on visible skin   Musculoskeletal:  Appears frail, with poor muscle tone, though moves all extremities spontaneously in bed.  No evidence of tenderness or erythema.    Neurologic: A&O x4, Cranial nerves II-XII are grossly intact.    Neuropsychiatric: affect calm, pleasant.     Medications     parenteral nutrition - ADULT compounded formula       NaCl 75 mL/hr at 02/07/18 3825     IV  fluid REPLACEMENT ONLY       - MEDICATION INSTRUCTIONS -       - MEDICATION INSTRUCTIONS -         enoxaparin  40 mg Subcutaneous Q24H     lipids  250 mL Intravenous Q24H     omeprazole  20 mg Oral Daily     oxyCODONE  10 mg Oral Q12H     ondansetron  8 mg Intravenous Q8H    Or     ondansetron  8 mg Oral Q8H    Or     ondansetron  8 mg Oral Q8H     sodium chloride (PF)  3 mL Intracatheter Q8H       Data   Results for orders placed or performed during the hospital encounter of 02/05/18 (from the past 24 hour(s))   CBC with platelets differential   Result Value Ref Range    WBC 13.9 (H) 4.0 - 11.0 10e9/L    RBC Count 2.96 (L) 4.4 - 5.9 10e12/L    Hemoglobin 8.3 (L) 13.3 - 17.7 g/dL    Hematocrit 25.0 (L) 40.0 - 53.0 %    MCV 85 78 - 100 fl    MCH 28.0 26.5 - 33.0 pg    MCHC 33.2 31.5 - 36.5 g/dL    RDW 15.7 (H) 10.0 - 15.0 %    Platelet Count 320 150 - 450 10e9/L    Diff Method Manual Differential     % Neutrophils 79.8 %    % Lymphocytes 8.8 %    % Monocytes 4.4 %    % Eosinophils 3.5 %    % Basophils 0.0 %    % Metamyelocytes 3.5 %    Absolute Neutrophil 11.1 (H) 1.6 - 8.3 10e9/L    Absolute Lymphocytes 1.2 0.8 - 5.3 10e9/L    Absolute Monocytes 0.6 0.0 - 1.3 10e9/L    Absolute Eosinophils 0.5 0.0 - 0.7 10e9/L    Absolute Basophils 0.0 0.0 - 0.2 10e9/L    Absolute Metamyelocytes 0.5 (H) 0 10e9/L    Anisocytosis Slight     Poikilocytosis Marked     Target Cells Marked     Macrocytes Present     Platelet Estimate Confirming automated cell count    Basic metabolic panel   Result Value Ref Range    Sodium 136 133 - 144 mmol/L    Potassium 3.8 3.4 - 5.3 mmol/L    Chloride 102 94 - 109 mmol/L    Carbon Dioxide 26 20 - 32 mmol/L    Anion Gap 8 3 - 14 mmol/L    Glucose 112 (H) 70 - 99 mg/dL    Urea Nitrogen 11 7 - 30 mg/dL    Creatinine 0.62 (L) 0.66 - 1.25 mg/dL    GFR Estimate >90 >60 mL/min/1.7m2    GFR Estimate If Black >90 >60 mL/min/1.7m2    Calcium 7.8 (L) 8.5 - 10.1 mg/dL   Magnesium   Result Value Ref Range     Magnesium 2.0 1.6 - 2.3 mg/dL   Phosphorus   Result Value Ref Range    Phosphorus 2.4 (L) 2.5 - 4.5 mg/dL       Recent Labs  Lab 02/08/18  0555 02/07/18  1147 02/07/18  1000 02/07/18  0830 02/07/18  0546  02/06/18  1423  02/06/18  0544  02/05/18  0916   WBC 13.9*  --   --  22.8*  --   --   --   --  33.2*  --  36.7*   HGB 8.3*  --   --  8.5*  --   --   --   --  9.4*  --  11.4*   MCV 85  --   --  81  --   --   --   --  83  --  81     --   --  331  --   --   --   --  341  --  382   INR  --   --  2.14*  --   --   --  2.31*  --   --   --   --     133  --   --  134  < >  --   < > 128*  < > 123*   POTASSIUM 3.8  --   --   --  3.7  --   --   --  3.5  --  3.5   CHLORIDE 102  --   --   --  100  --   --   --  93*  --  86*   CO2 26  --   --   --  25  --   --   --  26  --  24   BUN 11  --   --   --  12  --   --   --  24  --  40*   CR 0.62*  --   --   --  0.74  --   --   --  0.94  --  1.57*   ANIONGAP 8  --   --   --  9  --   --   --  9  --  12   ANNAMARIE 7.8*  --   --   --  7.8*  --   --   --  8.4*  --  8.7   *  --   --   --  94  --   --   --  94  --  167*   ALBUMIN  --   --   --   --   --   --   --   --  1.8*  --  2.5*   PROTTOTAL  --   --   --   --   --   --   --   --  6.6*  --  8.1   BILITOTAL  --   --   --   --   --   --   --   --  0.9  --  0.6   ALKPHOS  --   --   --   --   --   --   --   --  229*  --  213*   ALT  --   --   --   --   --   --   --   --  10  --  12   AST  --   --   --   --   --   --   --   --  16  --  18   < > = values in this interval not displayed.  No results found for this or any previous visit (from the past 24 hour(s)).

## 2018-02-08 NOTE — PLAN OF CARE
"Problem: Patient Care Overview  Goal: Plan of Care/Patient Progress Review  Discharge Planner PT   Patient plan for discharge: Home   Current status: Pt demonstrates Mod I w/ gait using UE support on IV pole for balance, pt not recently given any pain meds/oxy. Denied dizziness, no LOB. Pt ambualted w/o UE support, SBA for safety, no LOB, navigated hallways safely. Up/down 12 6\" steps w/ RHR; safe ft placement, no LOB. Provided w/ standing LE HEP. Instructed to ambulate in hallway w/ IV pole, does not need nursing to provide SBA if pt has not recently been given pain meds as pt states sometimes they can make him feel light headed. RN informed    Barriers to return to prior living situation: None  Recommendations for discharge: Home w/ cont HEP  Rationale for recommendations: Pt safe to DC home when medically stable          Entered by: Miriam Jain 02/08/2018 2:51 PM           "

## 2018-02-09 NOTE — PLAN OF CARE
VSS. Afebrile. Pain controlled w/ oxycodone 5mg x 1. Pt up independently. Less ileostomy output, but pt dumped w/o measuring. TPN hung at 1600. BG 98. Pt will go home w/ TPN. Teaching consult placed for tomorrow. Nausea controlled w/ scheduled zofran. Full liquid diet. Continue POC.

## 2018-02-09 NOTE — PROGRESS NOTES
Home Infusion  Sebas is discharging tomorrow and will be going home on continuous TPN     He has been home with IV chemo before but never self-adminstered home IV therapy.  He will attend the [facility] for initial teaching tomorrow.  Met with Sebas at bedside and provided him with information about [facility] services.  Explained about administration method and that [facility] will teach him or a CG to prep and self-administer his TPN over the course of the first few days after discharge.  Informed him that tomorrow [facility] will deliver some TPN and supplies to his hosp room and I would return to prep and hook up his continuous infusion.  He will have SNV at home the following day.  Informed him about ongoing delivery of supplies, storage of TPN,  plan for SNV and 24/7 availability of [facility] staff while on IV therapy.     Sebas verbalized understanding of all information given.   He is willing and able to learn and manage home IV therapy.  Questions answered.  Will return tomorrow once dc is confirmed and supplies are delivered.  Safia Olivera RN CINTHIA  [facility] Liaison  [phone] pager  Returned to pt's room once supplies arrived.  Prepped and set up the TPN per orders.  Verified pump settings, flushed port after checking for blood return and initiated the 24 hr cycle.  Reminded Sebas about storage of his TPN and MVI, and vit k, plan for future deliveries and SNV. Home nursing services will be provided by [facility] and they will see him tomorrow.  Contact info for both [facility] and [facility] provided to pt.

## 2018-02-09 NOTE — PLAN OF CARE
Problem: Patient Care Overview  Goal: Plan of Care/Patient Progress Review  Outcome: Improving  VSS. C/o abdominal pain this AM. Oxycodone x 1. Voiding adequately. Ostomy with small amount of liquid brown output. Nausea this AM, relieved with po Zofran. TPN/lipids infusing into port overnight. Up independently. [facility] learning center consult placed for possible appointment today prior to d/c. Plan to d/c home today.

## 2018-02-09 NOTE — PLAN OF CARE
2/9/18 Patient(pt)and a nursing student to [facility] IVAD/TPN appointment.The pt.correctly returned saline/heparin flushes,and CADD Cruz pump TPN skills using [facility] supplies on PLC model,asked a few questions,able to answer and demonstrate all teach back.Home care to follow.Also see education flow sheet.  Written information given and reviewed today:Handwashing,CADD Cruz TPN,[facility] web site,Saline/Heparin flush.

## 2018-02-09 NOTE — PROGRESS NOTES
Nursing Focus: Discharge    D: Patient discharged to home at 1500 hours. Patient was afebrile, vitally stable and pain was well controlled on oral oxycontin and oxycodone.  All belongings sent with the patient and his parents.    I: Discharge prescriptions sent to discharge pharmacy to be filled. All discharge medications and instructions reviewed with Sebas. Patient instructed to call clinic triage nurse if he experiences a fever >100.4, uncontrolled nausea, vomiting, diarrhea, or pain; or experiences any signs or symptoms of bleeding. Other phone numbers to call with questions or concerns after discharge reviewed with the patient. Follow-up outpatient appointment scheduled reviewed with the patient.  PIV removed. [facility] came to the hospital to help set up continuous TPN prior to discharge.  Education completed.  Care Plan goals met and adequate for discharge. Patient did go to [facility] to learn about TPN administration today.    A: Patient verbalized understanding of discharge medications and instructions. Patient will  medications at discharge pharmacy. No other concerns or complaints prior to discharge.     P: Patient to follow-up in clinic with Dr. Long on February 15th.

## 2018-02-09 NOTE — PROGRESS NOTES
Care Coordinator- Discharge Planning     Admission Date/Time:  2/5/2018  Attending MD:  Tmamy Palacio MD     Data  Date of initial CC assessment:  2/5  Chart reviewed, discussed with interdisciplinary team.   Patient was admitted for:   1. Large bowel obstruction         Assessment  Concerns with insurance coverage for discharge needs: None.  Current Living Situation: Patient lives with family.  Support System: Supportive  Services Involved: Home Infusion  Transportation: Family or Friend will provide  Barriers to Discharge: none      Coordination of Care and Referrals: Provided patient/family with options for Home Infusion.    2/8/2018  Anticipate d/c home with TPN initiated this hospitalization.  Telephone referral given to [facility] and AVS updated.  Epic referral entered for PLC.  Cont to monitor.  ------------------------------------------------------------------    02/09/18   Planning for d/c home today with TPN.  [facility] pharmacist faxed TPN orders to [facility].  Safia [facility] liaison, updated.      Plan  Anticipated Discharge Date: 02/09/18  Anticipated Discharge Plan:  home    CTS Handoff completed:  At d/c.    Karina Menon RN   ________________________________________  Karina Menon RN, BSN    7D/Oncology Care Coordinator  Pager  [phone]  Phone [phone]

## 2018-02-09 NOTE — DISCHARGE SUMMARY
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 admitted with generalized weakness, leukocytosis, hyponatremia, abdominal pain and nausea/vomiting. CT scan was done that showed increased colonic mass and colonic distention.  He was evaluated by colorectal surgery team, who recommended a cecostomy. He was also evaluated by GI team, who recommended a colonic stent placement.  It was decided to place a colonic stent, and this was placed on 2/6. Symptoms much improved after stent placement. He was started on TPN while inpatient, as patient has not been able to maintain oral intake due to nausea/vomiting.  Pt discharged on TPN -- will defer to clinic to consider G tube / TF outpatient.  Pt discharged with scheduled zofran.  Given refill on oxycodone tabs.  Pt to follow up with Dr. Long on 2/15.      Hospital Course   Sebas Lopez was admitted on 2/5/2018.  The following problems were addressed during his hospitalization:    #Leukocytosis. IMPROVED. WBC 36.7, ANC 30.5 on admission. Tachycardic, but not hypotensive on admission.  /86.  As patient is presenting with abdominal pain, concern for intra abdominal infection, given previous CT scans.  Abdominal exam with generalized tenderness on exam, but no peritoneal signs. Chest CT on 2/1 negative for pulmonary infectious process and patient without respiratory symptoms. Lactate 1.0 on admission. CT abd/pelvis 2/5 demonstrates obstructing sigmoid colon mass with progression of diffuse colonic distention since 1/26/2018. No evidence of bowel necrosis or perforation.  "No intra-abdominal infection noted.   - cdiff negative  - UA negative, culture NGTD   - bcx 2/5 NGTD  - as patient remains afebrile and WBC is trending down will hold off on starting antibiotics at this time.       #PAULINA.  #Hyponatremia.   Cr elevated to 1.57 on admission. Baseline Cr 0.1-1.0.  BUN/Cr ratio elevated. Sodium low at 123 on admission and patient having difficulty with word finding, feeling \"foggy\" on admission. Likely due to hypovolemia, as patient has not been eating/drinking well due to nausea, ileostomy with high volume liquid output.  Sodium and creatinine have improved with IVF.  - UA negative, culture pending.  Urine sodium consistent with hypovolemia.   - encouraged good PO intake and hydration outpatient        # Abdominal Pain, Nausea/vomiting/diarrhea 2/2 Colonic Mass/Colonic distention. Ongoing issue.  Not able to eat/drink more than small sips due to nausea/pain.  Per Dr. Long's last note, CT A/p on 1/26 demonstrates stable disease, does not appear to be obstruction proximal to ileostomy. Patient reports he has been trying to take antiemetics scheduled at home, but continues to have nausea and bloating with with PO intake.  Not vomiting. Has ongoing high volume output from ileostomy. No blood in stools. Per clinic notes, there has been consideration of placing venting G tube or starting patient on TPN outpatient -- may need to consider if pt continues to have nausea with PO intake. Of note, patient does follow with palliative care outpatient. CT abd/pelvis 2/5 demonstrates obstructing sigmoid colon mass with progression of diffuse colonic distention since 1/26/2018. No evidence of bowel necrosis or perforation.   - schedule zofran q8h, compazine, ativan prn for nausea management   - scheduled oxycontin 10mg BID   - oxycodone 5-10 mg q4h prn for pain control  -  Nutrition consulted  - holding octreotide at this time   - consulted colorectal surgery who recommend cecostomy placement in IR to " remove some of the fluid in cecum that may be contributing to symptoms. GI placed colonic stent on 2/6.  Symptoms have much improved after stent placement. .    - IR consulted for cecostomy placement and/or venting G tube placement.  PEG may be needed eventually, but not likely to help symptoms at this time.   - will plan to start TPN/lipids while inpatient to support malnutrition -- pt to advance diet as tolerated outpatient.  Encouraged pt to start with low residue, low fat diet, advance as tolerated per GI recommendation.   . Pt may need PEG tube eventually, would be candidate for TF, though will defer this decision to outpatient team. Patient does not want more tubes in abdomen at this time.       # Stage IV Sigmoid Adenocarcinoma with peritoneal carcinomatosis.  S/p ex lap 7/2017 with small bowel resection and end ileostomy. S/p 6 cycles of FOLFOX. Admitted on 12/6/2017 for ex-lap with aborted HIPEC due to diffuse carcinomatosis. Follows with Dr. Long outpatient. Next generation sequencing--no mutations were identified in analyzed regions of KRAS, NRAS, BRAF, HRAS or PIK3CA. Therefore, this patient may be eligible for anti-EGFR antibody therapy if clinically indicated .  MSI testing with no microsatellite instability. CT A/P on 1/26 with 5.2 x 4.5 x 3.6cm proximal sigmoid mass, causing colonic obstruction with significant distention of cecum (7cm in diameter), ascending colon and proximal transverse colon.  No pneumatosis or pneumoperitoneum . also small volume ascites. No surgical intervention per Dr. Dudley (surg onc)   - will need to f/u with Dr. Long for further treatment plan   - continue to follow with palliative care outpatient for symptom management       # Anemia of chronic disease.   Pt with Hgb of 11.4 on admission. Has previously been on IV/PO iron supplementation, not currently taking. Stable. No evidence of bleeding.   - monitor, transfuse to keep Hgb > 7.0       # Generalized weakness 2/2  malignancy, possible infection, poor nutrition. Pt reports weakness has become progressive over past couple of weeks, has noted it more as he has been not eating or drinking as much.   - PT/OT consulted, ok to discharge home with assist   - nutrition consult      # Severe malnutrition in the context of acute on chronic illness. Evidenced by poor oral intake <50% of TID meals, >10% weight loss in 6 months.    - encourage regular diet as tolerated   - cont TPN at discharge.  PLC appt prior to discharge.   - replace lytes prn       FEN  - regular diet as tolerated, TPN    - lyte replacement prn   - MIVF @ 75mL/hr   - nutrition consult as above     PPx:   GI: prilosec 20mg daily   VTE: lovenox 40mg daily SQ ppx   Code status: full code       Dispo/follow up: Ok to discharge today 2/9. Patient is asymptomatic, will be discharged with home TPN/home infusion services.  Patient to follow up in clinic with Dr. Long on 2/15.      Above discussed with staff attending, Dr. Finch.       Es Dumont, DNP, APRN, CNP  Hematology/oncology  [id number]         Significant Results and Procedures   Colonic stent placement 2/6     Pending Results   These results will be followed up by primary team in [facility]   Unresulted Labs Ordered in the Past 30 Days of this Admission     Date and Time Order Name Status Description    2/5/2018 1439 Blood culture Preliminary     2/5/2018 1439 Blood culture Preliminary           Code Status   Full Code    Primary Care Physician   NGUYỄN HERNÁNDEZ    Physical Exam   Temp: 97.2  F (36.2  C) Temp src: Oral BP: 96/62   Heart Rate: 69 Resp: 15 SpO2: 97 % O2 Device: None (Room air)    Vitals:    02/07/18 1122 02/08/18 0822 02/09/18 0843   Weight: 64.4 kg (141 lb 14.4 oz) 63.6 kg (140 lb 3.2 oz) 64.1 kg (141 lb 4.8 oz)     Vital Signs with Ranges  Temp:  [97  F (36.1  C)-98.9  F (37.2  C)] 97.2  F (36.2  C)  Heart Rate:  [68-79] 69  Resp:  [15-18] 15  BP: ()/(60-65) 96/62  SpO2:  [96 %-99 %] 97  %  I/O last 3 completed shifts:  In: 4863.54 [P.O.:1580; I.V.:1885]  Out: 2375 [Urine:1100; Stool:1275]  Constitutional: awake, alert, pleasant, chronically ill appearing male, in NAD   ENT: OP is dry, pink, without evidence of thrush or mucositis.  Pupils are round,equal in size, reactive to light.   Respiratory: LS CTA, no wheezes or crackles. Breathing comfortably on room air.   Cardiovascular: HRR, no murmur, no rub. No edema.   GI: abd Is significantly less distended today than yesterday, abd is soft, non tender.  Bowel sounds normal. Ileostomy intact in RLQ, stoma pink, non erythematous, ostomy with liquid yellow-brown output.   Lymph/Hematologic: no s/s bleeding  Or cervical lympadenapthy.   Skin: dry and intact, without evidence of rash or lesions on visible skin   Musculoskeletal:  Appears frail, with poor muscle tone, though moves all extremities spontaneously in bed.  No evidence of tenderness or erythema.    Neurologic: A&O x4, Cranial nerves II-XII are grossly intact.    Neuropsychiatric: affect calm, pleasant.     Time Spent on this Encounter   I, Es Dumont, personally saw the patient today and spent greater than 30 minutes discharging this patient.    Discharge Disposition   Discharged to home  Condition at discharge: Stable    Consultations This Hospital Stay   NUTRITION SERVICES ADULT IP CONSULT  PHYSICAL THERAPY ADULT IP CONSULT  COLORECTAL SURGERY ADULT IP CONSULT  INTERVENTIONAL RADIOLOGY ADULT/PEDS IP CONSULT  PHARMACY/NUTRITION TO START AND MANAGE TPN  GI LUMINAL ADULT IP CONSULT  PHARMACY IP CONSULT  VASCULAR ACCESS CARE ADULT IP CONSULT  PATIENT LEARNING CENTER IP CONSULT  PATIENT LEARNING CENTER IP CONSULT    Discharge Orders     Home infusion referral     Reason for your hospital stay   You were admitted with abdominal pain, nausea/vomiting. A CT scan showed colonic blockage, so a stent was placed.     Follow Up and recommended labs and tests   Follow up in clinic as scheduled.      Activity   Your activity upon discharge: as tolerated.     When to contact your care team   [facility] [place] at [phone] for temp >100.4, uncontrolled nausea/vomiting/diarrhea/constipation, unrelieved pain, bleeding not relieved with pressure, dizziness, chest pain, shortness of breath, loss of consciousness, and any new or concerning symptoms.     Discharge Instructions   It is important that you take you anti nausea medication (zofran) every 8 hours scheduled to help control nausea.  If you feel that you are becoming constipated, let your provider know, as this can be a side effect of zofran.      Advance you diet slowly -- start with low residue / low fat foods, and work your way back to regular diet.     Full Code     Diet   Follow this diet upon discharge: TPN, advance diet as tolerated.       Discharge Medications   Current Discharge Medication List      CONTINUE these medications which have CHANGED    Details   oxyCODONE (OXY-IR) 5 MG capsule Take 1-2 capsules (5-10 mg) by mouth every 4 hours as needed for moderate to severe pain  Qty: 60 capsule, Refills: 0    Associated Diagnoses: Peritoneal carcinomatosis (H); Cancer of sigmoid colon (H)      ondansetron (ZOFRAN-ODT) 8 MG ODT tab Take 1 tablet (8 mg) by mouth every 8 hours  Qty: 30 tablet, Refills: 1    Associated Diagnoses: Nausea         CONTINUE these medications which have NOT CHANGED    Details   oxyCODONE (OXYCONTIN) 10 MG 12 hr tablet Take 1 tablet (10 mg) by mouth every 12 hours  Qty: 60 tablet, Refills: 0    Associated Diagnoses: Peritoneal carcinomatosis (H); Cancer of sigmoid colon (H)      omeprazole (PRILOSEC) 20 MG CR capsule Take 1 capsule (20 mg) by mouth daily  Qty: 30 capsule, Refills: 11    Associated Diagnoses: Diarrhea, unspecified type      loperamide (IMODIUM) 2 MG capsule Take 2 capsules (4 mg) by mouth 4 times daily as needed for diarrhea  Qty: 240 capsule, Refills: 11    Associated Diagnoses: Diarrhea,  unspecified type      opium tincture tincture Take 0.6 mLs (6 mg) by mouth 4 times daily  Qty: 72 mL, Refills: 0    Associated Diagnoses: Diarrhea, unspecified type; Cancer of sigmoid colon (H); Peritoneal carcinomatosis (H)      diphenoxylate-atropine (LOMOTIL) 2.5-0.025 MG per tablet Take 1-2 tablets by mouth 4 times daily as needed for diarrhea  Qty: 240 tablet, Refills: 5    Associated Diagnoses: Diarrhea due to drug      amylase-lipase-protease (CREON) 04689 UNITS CPEP Take 2 capsules (72,000 Units) by mouth 3 times daily (with meals) And 1 capsule with each snack tid.  Qty: 240 capsule, Refills: 3    Comments: Mail to patient if not yet due to be filled.  Associated Diagnoses: Diarrhea, unspecified type; Peritoneal carcinomatosis (H); Cancer of sigmoid colon (H)      LORazepam (ATIVAN) 0.5 MG tablet Take 1 tablet (0.5 mg) by mouth every 4 hours as needed (Anxiety, Nausea/Vomiting or Sleep)  Qty: 30 tablet, Refills: 5    Associated Diagnoses: Peritoneal carcinomatosis (H); Cancer of sigmoid colon (H)      Psyllium (METAMUCIL FIBER) 51.7 % PACK Take 1 packet by mouth 3 times daily  Qty: 90 each, Refills: 3    Associated Diagnoses: Diarrhea, unspecified type      Ferrous Sulfate (IRON SUPPLEMENT PO) Take 325 mg by mouth 2 times daily (with meals)          STOP taking these medications       octreotide (SANDOSTATIN) 50 MCG/ML SOLN injection Comments:   Reason for Stopping:         magnesium oxide (MAG-OX) 400 MG tablet Comments:   Reason for Stopping:         Potassium Chloride 40 MEQ/15ML (20%) SOLN Comments:   Reason for Stopping:             Allergies   No Known Allergies  Data   Most Recent 3 CBC's:  Recent Labs   Lab Test  02/09/18   0614  02/08/18   0555  02/07/18   0830   WBC  11.9*  13.9*  22.8*   HGB  8.2*  8.3*  8.5*   MCV  84  85  81   PLT  390  320  331      Most Recent 3 BMP's:  Recent Labs   Lab Test  02/09/18   0614  02/08/18   0555  02/07/18   1147  02/07/18   0546   NA  134  136  133  134    POTASSIUM  4.0  3.8   --   3.7   CHLORIDE  102  102   --   100   CO2  26  26   --   25   BUN  11  11   --   12   CR  0.60*  0.62*   --   0.74   ANIONGAP  6  8   --   9   ANNAMARIE  7.7*  7.8*   --   7.8*   GLC  111*  112*   --   94     Most Recent 2 LFT's:  Recent Labs   Lab Test  02/06/18   0544  02/05/18   0916   AST  16  18   ALT  10  12   ALKPHOS  229*  213*   BILITOTAL  0.9  0.6     Most Recent INR's and Anticoagulation Dosing History:  Anticoagulation Dose History     Recent Dosing and Labs Latest Ref Rng & Units 8/10/2017 1/26/2018 2/6/2018 2/7/2018    INR 0.86 - 1.14 1.12 1.25(H) 2.31(H) 2.14(H)        Most Recent 3 Troponin's:No lab results found.  Most Recent Cholesterol Panel:  Recent Labs   Lab Test  02/07/18   0546   TRIG  160*     Most Recent 6 Bacteria Isolates From Any Culture (See EPIC Reports for Culture Details):  Recent Labs   Lab Test  02/05/18   1629  02/05/18   1615  02/05/18   1310   CULT  No growth after 4 days  No growth after 4 days  <1000 colonies/mL  urogenital susan    Susceptibility testing not routinely done     Most Recent TSH, T4 and A1c Labs:No lab results found.  Results for orders placed or performed during the hospital encounter of 02/05/18   CT Abdomen Pelvis w/o & w Contrast     Value    Radiologist flags Increased colonic distention (Urgent)    Narrative    EXAMINATION: CT ABDOMEN PELVIS W/O & W CONTRAST, 2/5/2018 3:44  PM    TECHNIQUE:  Helical CT images from the lung bases through the  symphysis pubis were obtained with IV contrast. Contrast dose:  iopamidol (ISOVUE-370) solution 88 mL    COMPARISON: 1/26/2018    HISTORY: pt with metastatic colon cancer, PAULINA, abd pain/nausea,  r/o  infection;     FINDINGS:    Abdomen and pelvis: Obstructing mass in the sigmoid colon measuring  proximally 5.5 x 4.2 cm in the axial plane (series 2 image 150), which  is not significantly changed from 1/26/2018. There is distention of  the upstream colon and terminal ileum stump, progressed  from  1/26/2018. For example the cecum measures up to 11.6 cm in diameter,  previously 7.5 cm. There is a right lower quadrant end ileostomy,  without distention of the upstream small bowel loops. There is  moderate layering free fluid in the pelvis, increased from 1/26/2018.  No free air. No pneumatosis, portal or mesenteric venous gas.  Peritoneal soft tissue thickening, most prominent in the anterior  midline abdomen  (series 2 image 111), and in the right paracolic  gutter (series 2 image 109), is not significantly changed. Presumed  hernia mesh repair in the midline anterior abdomen.    The liver, gallbladder, pancreas, spleen, and adrenals are within  normal limits. Simple cysts in the right kidney. No hydronephrosis or  hydroureter. Bladder is obscured by streak artifact from left hip  arthroplasty. Prostate is mildly enlarged. Major abdominal vasculature  is patent. Aorta is normal in caliber. Mildly prominent mesenteric  lymph nodes are progressed from 1/26/2018, for example 1.0 cm short  axis diameter mesenteric node adjacent to the superior mesenteric vein  (series 2 image 80), previously measured 0.6 cm, and 1.1 cm short axis  diameter node anterior to the to the inferior vena cava (series 2  image 96), previously measured 0.8 cm.    Lung bases: Linear scarring/atelectasis in both lung bases.    Bones and soft tissues: No acute fracture or suspicious bone lesion.  Total left hip arthroplasty. Presumed degenerative cystic change in  the left acetabulum.      Impression    IMPRESSION:   1. Obstructing sigmoid colon mass with progression of diffuse colonic  distention since 1/26/2018. No evidence of bowel necrosis or  perforation.   2. Right lower quadrant end ileostomy, without evidence of obstruction  of the upstream small bowel.  3. Mild mesenteric lymphadenopathy, increased from 1/26/2018, may  reflect mesenteric congestion.  4. Peritoneal thickening and nodularity is not significantly changed.  5.  Increased small ascites.    [Urgent Result: Increased colonic distention]    Finding was identified on 2/5/2018 3:55 PM.     Dr. Norman was contacted by Dr. Harris at 2/5/2018 4:28 PM and  verbalized understanding of the urgent finding.     I have personally reviewed the examination and initial interpretation  and I agree with the findings.    TARAH DIAZ MD   XR Surgery TALA L/T 5 Min Fluoro w Stills    Narrative    This exam was marked as non-reportable because it will not be read by a   radiologist or a [place] non-radiologist provider.

## 2018-02-10 NOTE — PLAN OF CARE
Problem: Patient Care Overview  Goal: Plan of Care/Patient Progress Review  Physical Therapy Discharge Summary    Reason for therapy discharge:    Discharged to home.    Progress towards therapy goal(s). See goals on Care Plan in [facility] electronic health record for goal details.  Goals not met.  Barriers to achieving goals:   discharge from facility.    Therapy recommendation(s):    Continue home exercise program.

## 2018-02-12 NOTE — PROGRESS NOTES
"Follow - Up: Hospital Discharge    Dates of admission: 2/5-2/9    Discharge Diagnosis: Stent placement, failure to thrive    F/U Appt: 2/15/18    Spoke with Dayday who reports feeling \"much better\" since his admission. He is getting great relief from the stent placed. He is asking about having labs drawn through home care tomorrow for his appointment with Dr Long Per Dr Long no additional labs are needed other than what is being drawn as his TPN labs. Dayday had no additional questions or concerns Encouraged him to call with any additional questions or concerns.    "

## 2018-02-12 NOTE — PROGRESS NOTES
This is a recent snapshot of the patient's [facility] medical record.  For current drug dose and complete information and questions, call [phone] or In Basket pool, fv home infusion ([id number])  CSN Number:  [id number]

## 2018-02-12 NOTE — TELEPHONE ENCOUNTER
Sebas called to request his clinic labs scheduled for Thursday be drawn by [facility] nurse tomorrow, at the same time his TPN labs are drawn.     Message left for RNCC Kemi Cherry regarding patient's request, also notified her no lab orders for Thursday.

## 2018-02-12 NOTE — PROGRESS NOTES
This is a recent snapshot of the patient's [facility] medical record.  For current drug dose and complete information and questions, call [phone] or In Basket pool, fv home infusion ([id number])  CSN Number:  [id number]

## 2018-02-12 NOTE — PROGRESS NOTES
This is a recent snapshot of the patient's [facility] medical record.  For current drug dose and complete information and questions, call [phone] or In Basket pool, fv home infusion ([id number])  CSN Number:  [id number]

## 2018-02-12 NOTE — PROGRESS NOTES
Post colonoscopy (2/6/2018) with Dr. Mar: Follow-up    Post procedure recommendations: Return patient to hospital [facility] for ongoing care. - Patient has been warned of post procedure complications including stent migration and perforation. He also understands that this procedure was palliative. - Inpatient panc-bili consult to follow patient    Inpatient Panc/Bili team to follow: Will follow outpatient as appropriate.    Orders placed: None at this time.     Melida MCLEAN RN Coordinator  Dr. Cabrera, Dr. Rios & Dr. Mar  Advanced Endoscopy  [phone]

## 2018-02-15 NOTE — NURSING NOTE
"Oncology Rooming Note    February 15, 2018 12:03 PM   Sebas Lopez is a 52 year old male who presents for:    Chief Complaint   Patient presents with     Oncology Clinic Visit     Colon Ca, Hospital F/U.     Initial Vitals: /65  Pulse 102  Temp 97.6  F (36.4  C) (Oral)  Resp 16  Ht 1.753 m (5' 9.02\")  Wt 61.6 kg (135 lb 11.2 oz)  SpO2 99%  BMI 20.03 kg/m2 Estimated body mass index is 20.03 kg/(m^2) as calculated from the following:    Height as of this encounter: 1.753 m (5' 9.02\").    Weight as of this encounter: 61.6 kg (135 lb 11.2 oz). Body surface area is 1.73 meters squared.  No Pain (1) Comment: ABD   No LMP for male patient.  Allergies reviewed: Yes  Medications reviewed: Yes    Medications: MEDICATION REFILLS NEEDED TODAY. Provider was notified.  Pharmacy name entered into [facility]: [facility] - [place] - [address]    Clinical concerns: Oxycodone. Dr Long was notified.    7 minutes for nursing intake (face to face time)     Kay Trujillo LPN              "

## 2018-02-15 NOTE — PROGRESS NOTES
This is a recent snapshot of the patient's [facility] medical record.  For current drug dose and complete information and questions, call [phone] or In [facility] pool, fv home infusion ([id number])  CSN Number:  [id number]

## 2018-02-15 NOTE — LETTER
2/15/2018       RE: Sebas Lopez  [address] [place] [address]     Dear Colleague,    Thank you for referring your patient, Sebas Lopez, to the [facility]. Please see a copy of my visit note below.    Oncology follow up visit:  Date on this visit: 2/15/2018         CC  sigmoid adenocarcinoma with peritoneal carcinomatosis    Primary Physician: Waylon Han     History Of Present Illness:     Please see previous note for details. I have copied and updated from prior notes.   Sebas is a 52-year-old male who has a history of ulcerative colitis diagnosed more than 20 years ago, but he has not had medical management for that.  He was doing well, but in 05/2017 he presented with a few weeks of abdominal pain.  At that time, his imaging showed that he had a sigmoid wall thickening with adjacent mesenteric lymphadenopathy and free fluid near the abdominal wall mesh from his prior hernia surgery.  He subsequently underwent a colonoscopy which was incomplete and showed an obstructing sigmoid colon mass.  The biopsy from the sigmoid mass showed sigmoid adenocarcinoma.  He then developed obstructive symptoms and underwent exploratory laparotomy on 07/10/2017.  During that he was found to have diffuse peritoneal carcinomatosis.  Extensive lysis of adhesions was performed and a small bowel resection was performed with end ileostomy.  The biopsies from the multiple large peritoneal metastasis also were positive for metastatic adenocarcinoma. We still haven't received testing on MSI or NGS panel back     He started palliative FOLFOX on 8/11/17. C#6 given on 10/26/17  After 6 cycles, he has stable disease    On 12/6/17, he had Diagnostic laparoscopy and Exploratory laparotomy, but HIPEC was not performed as Peritoneal cancer index was 26 with diffuse involvement of the small bowel as well as the small bowel mesentery.     He then presented to ED on 1/26 with abdominal pain and  associated nausea. CT A/P on 1/26 with 5.2 x 4.5 x 3.6 cm proximal sigmoid mass causnig colonic obstriction with singificant distention of cecum (7cm in diameter), ascending colon and proximal transverse colon. No pneumatosis or pneumoperitoneum. Also small volume of ascites with associated findings concerning for peritoneal carcinomatosis, increased from prior studies. Dr. Dudley was consulted and recommended no surgical intervention.    He was then seen in clinic on 1/29/2018 as well as yesterday because for the last few days he was unable to tolerate solid foods and he was getting abdominal cramping and some nausea. He also noticed that his ostomy output decreased. He was given IV fluids as well as antibiotics and yesterday he was started on OxyContin 10 mg twice a day along with p.r.n. oxycodone. Of note a few weeks ago he was started on short acting octreotide to decrease the ostomy output but he stopped taking it about one week ago and few days prior to that he noticed worsening of the abdominal cramping.    Interim history  He was admitted to the hospital with worsening abdominal pain and a repeat CT scan on 2/5/2018 demonstrated obstructing sigmoid colon mass with progression of the colonic distention. At that time he had colonic stent placement which significantly improved his symptoms. At that time his white blood cell count was also very elevated but otherwise there was no evidence of infection so antibiotics were not given to him and the white blood cell count improved after the stent placement. He was discharged home with TPN.  He tells me that he is doing much better now. He has some nausea for which he takes Zofran. Denies vomiting. Abdominal pain is also under much better control. He is taking OxyContin 10 mg twice a day and he takes oxycodone on average 8 tablets every day. He is able to tolerate a soft diet as well as fluid intake. TPN is running for 24 hours. Ostomy is working well. Denies any  bleeding. He denies any new pain or new swellings. He does not have neuropathy currently. Denies any fevers or infections. Energy is improving. No trouble breathing. Overall he feels much better as compared to last time.      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      HERNIA REPAIR       INSERT PORT VASCULAR ACCESS Right 8/10/2017    Procedure: INSERT PORT VASCULAR ACCESS;  Single Lumen Right Chest Power Port;  Surgeon: Malena Andrew PA-C;  [facility]     LAPAROSCOPY DIAGNOSTIC (GENERAL) N/A 12/6/2017    Procedure: LAPAROSCOPY DIAGNOSTIC (GENERAL);  Diagnostic Laparoscopy, Exploratory Laparotomy Anesthesia Block ;  Surgeon: Rob Dudley MD;  [facility]     LAPAROTOMY EXPLORATORY N/A 12/6/2017    Procedure: LAPAROTOMY EXPLORATORY;;  Surgeon: Rob Dudley MD;  [facility]     ORTHOPEDIC SURGERY Left     HIP ARTHROPLASTY      Ulcerative colitis.  Left hip replacement.       Cancer History:   As above    Allergies:  Allergies  as of 02/15/2018     (No Known Allergies)     Current Medications:  Current Outpatient Prescriptions   Medication Sig Dispense Refill     oxyCODONE (OXYCONTIN) 10 MG 12 hr tablet Take 2 tablets (20 mg) by mouth every 12 hours 120 tablet 0     oxyCODONE (OXY-IR) 5 MG capsule Take 1-2 capsules (5-10 mg) by mouth every 4 hours as needed for moderate to severe pain 120 capsule 0     ondansetron (ZOFRAN-ODT) 8 MG ODT tab Take 1 tablet (8 mg) by mouth every 8 hours 30 tablet 1     omeprazole (PRILOSEC) 20 MG CR capsule Take 1 capsule (20 mg) by mouth daily 30 capsule 11     loperamide (IMODIUM) 2 MG capsule Take 2 capsules (4 mg) by mouth 4 times daily as needed for diarrhea (Patient not taking: Reported on 2/1/2018) 240 capsule 11     opium tincture tincture Take 0.6 mLs (6 mg) by mouth 4 times daily (Patient not taking: Reported on 2/1/2018) 72 mL 0     diphenoxylate-atropine (LOMOTIL) 2.5-0.025 MG per tablet Take 1-2 tablets by mouth 4 times daily as needed for diarrhea (Patient not taking: Reported on 1/31/2018) 240 tablet 5     amylase-lipase-protease (CREON) 67898 UNITS CPEP Take 2 capsules (72,000 Units) by mouth 3 times daily (with meals) And 1 capsule with each snack tid. (Patient not taking: Reported on 1/31/2018) 240 capsule 3     LORazepam (ATIVAN) 0.5 MG tablet Take 1 tablet (0.5 mg) by mouth every 4 hours as needed (Anxiety, Nausea/Vomiting or Sleep) (Patient not taking: Reported on 2/1/2018) 30 tablet 5     Psyllium (METAMUCIL FIBER) 51.7 % PACK Take 1 packet by mouth 3 times daily (Patient not taking: Reported on 2/1/2018) 90 each 3     Ferrous Sulfate (IRON SUPPLEMENT PO) Take 325 mg by mouth 2 times daily (with meals)         Family History:  Family History   Problem Relation Age of Onset     Hypertension Father      DIABETES Father       No family history of cancer.  He does not have any kids.       Social History:  Social History     Social History     Marital status: Single     Spouse name: N/A      "Number of children: N/A     Years of education: N/A     Occupational History     Not on file.     Social History Main Topics     Smoking status: Never Smoker     Smokeless tobacco: Never Used     Alcohol use 3.0 oz/week     5 Cans of beer per week      Comment: none for last 4 months     Drug use: No     Sexual activity: Not on file     Other Topics Concern     Not on file     Social History Narrative   He does not smoke and does not drink.  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  He lives with his girlfriend, Bessie.       Physical Exam:  /65  Pulse 102  Temp 97.6  F (36.4  C) (Oral)  Resp 16  Ht 1.753 m (5' 9.02\")  Wt 61.6 kg (135 lb 11.2 oz)  SpO2 99%  BMI 20.03 kg/m2  CONSTITUTIONAL: He looks much better today and is in no acute distress  EYES: PERRLA, mild palor no icterus.   ENT/MOUTH: no mouth lesions. Ears normal  CVS: s1s2 no m r g .   RESPIRATORY: clear to auscultation b/l  GI: Abdomen is soft. Ostomy site is clean. There is mild tenderness along the left side of the ostomy but no guarding or rigidity or rebound. No hepatosplenomegaly.   NEURO: AAOX3  Grossly non focal neuro exam  INTEGUMENT: no obvious rashes  LYMPHATIC: no palpable cervical, supraclavicular, axillary LAD  MUSCULOSKELETAL: Unremarkable. No bony tenderness.   EXTREMITIES: no edema  PSYCH: Mentation, mood and affect are normal. Decision making capacity is intact        Laboratory/Imaging Studies    Reviewed    NGS PANEL COLORECTAL    No mutations were identified in analyzed regions of KRAS, NRAS, BRAF, HRAS, or PIK3CA.       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  The sigmoid carcinoma is obstructing, requiring exploratory laparotomy and end ileostomy along with small bowel resection.    MSI intact and NGS panel does not reveal any identifiable mutations     He has been started on FOLFOX palliative chemotherapy. After 6 cycles, he had stable disease      On " 12/6/17, he had Diagnostic laparoscopy and Exploratory laparotomy, but HIPEC was not performed as Peritoneal cancer index was 26 with diffuse involvement of the small bowel as well as the small bowel mesentery.     Repeat CT scan showed worsening peritoneal carcinomatosis and he is getting more symptomatic in terms of worsening abdominal pain and difficulty eating because of worsening abdominal cramping. He was admitted to the hospital with worsening colon distention and colon stent was placed which improved his symptoms. Now he is doing much better. We will resume FOLFOX next week.   Because of previous neuropathy I will decrease the dose of oxaliplatin to 70 mg/m .   I will consider adding Avastin later on. KRAS is wild type so he can be a candidate for anti-EGFR therapy as well. At this time I do not want to introduce other medications because I want to see some stability before introducing additional drugs     I plan to give him 4-6 cycles of FOLFOX before repeat imaging.      Abdominal pain due to peritoneal carcinomatosis and colon obstruction. This is much better after placement of colonic stent. Because he is using oxycodone on on average 8 tablets a day I will increase OxyContin to 20 mg twice a day and gave him oxycodone as needed. He will follow with palliative care.    Nausea. This is mild and he will continue to take Zofran and other anti-emetics.    Nutrition. This is improved after colonic stent placement. He is able to tolerate soft food and fluids. He is also on TPN. He will closely follow with nutritionist. We might have to disconnect TPN for a couple of days when 5-FU infusion is running.    He will resume FOLFOX next week.  2 weeks after that he will return to the clinic with labs and see a provider and continue with FOLFOX.    I answered all of his questions to his satisfaction and he is agreeable and comfortable with the plan    Albert Long             Again, thank you for allowing me to  participate in the care of your patient.      Sincerely,    Albert Long MD

## 2018-02-15 NOTE — MR AVS SNAPSHOT
After Visit Summary   2/15/2018    Sebas Lopez    MRN: [id number]           Patient Information     Date Of Birth          1965        Visit Information        Provider Department      2/15/2018 12:00 PM Albert Long MD [facility]        Today's Diagnoses     Peritoneal carcinomatosis (H)        Cancer of sigmoid colon (H)          Care Instructions    Resume FOLFOX on 2/21 as scheduled  No need to see a provider then    3/7- RTC with labs and see provider and FOLFOX    Increase oxycontin to 20 mg twice daily _ prescription given    Cont Oxycodone 5-10 mg every 4 hrs as needed _ prescription given              Follow-ups after your visit        Your next 10 appointments already scheduled     Feb 21, 2018 11:00 AM CST   (Arrive by 10:45 AM)   New Patient Visit with Karime Coronel RD   [facility] ([facility])    [address] [place] [address]  [address]  [facility] [id number] [phone]            Feb 21, 2018 12:30 PM CST   [facility] Draw with  [facility]   [facility] ([facility])    [address] [place] [address]  [address]  [facility] [id number] [phone]            Feb 21, 2018  1:00 PM CST   Infusion 240 with  ONCOLOGY INFUSION, UC 10 ATC   [facility] ([facility])    [address] [place] [address]  [address]  [facility] [address]   [phone]            Mar 01, 2018  4:15 PM CST   (Arrive by 4:00 PM)   Return Visit with Sukhdeep Mota MD   [facility] ([facility])    [address] [place] [address]  [address]  [facility] [id number] [phone]              Who to contact     If you have questions or need follow up information about today's clinic visit or your schedule please contact [facility] directly at [phone].  Normal or non-critical lab and imaging  "results will be communicated to you by MyChart, letter or phone within 4 business days after the clinic has received the results. If you do not hear from us within 7 days, please contact the clinic through Open[facility] or phone. If you have a critical or abnormal lab result, we will notify you by phone as soon as possible.  Submit refill requests through [facility] or call your pharmacy and they will forward the refill request to us. Please allow 3 business days for your refill to be completed.          Additional Information About Your Visit        OpenROV Information     Open[facility]OV gives you secure access to your electronic health record. If you see a primary care provider, you can also send messages to your care team and make appointments. If you have questions, please call your primary care clinic.  If you do not have a primary care provider, please call [phone] and they will assist you.        Care EveryWhere ID     This is your Care EveryWhere ID. This could be used by other organizations to access your [facility] medical records  [id number]        Your Vitals Were     Pulse Temperature Respirations Height Pulse Oximetry BMI (Body Mass Index)    102 97.6  F (36.4  C) (Oral) 16 1.753 m (5' 9.02\") 99% 20.03 kg/m2       Blood Pressure from Last 3 Encounters:   02/15/18 122/65   02/09/18 97/65   02/05/18 124/86    Weight from Last 3 Encounters:   02/15/18 61.6 kg (135 lb 11.2 oz)   02/09/18 64.1 kg (141 lb 4.8 oz)   02/05/18 66.5 kg (146 lb 11.2 oz)              Today, you had the following     No orders found for display         Today's Medication Changes          These changes are accurate as of 2/15/18 12:37 PM.  If you have any questions, ask your nurse or doctor.               These medicines have changed or have updated prescriptions.        Dose/Directions    * oxyCODONE 10 MG 12 hr tablet   Commonly known as:  OXYCONTIN   This may have changed:  how much to take   Used for:  Peritoneal carcinomatosis (H), Cancer " of sigmoid colon (H)   Changed by:  Albert Long MD        Dose:  20 mg   Take 2 tablets (20 mg) by mouth every 12 hours   Quantity:  120 tablet   Refills:  0       * oxyCODONE 5 MG capsule   Commonly known as:  OXY-IR   This may have changed:  Another medication with the same name was changed. Make sure you understand how and when to take each.   Used for:  Peritoneal carcinomatosis (H), Cancer of sigmoid colon (H)   Changed by:  Albert Long MD        Dose:  5-10 mg   Take 1-2 capsules (5-10 mg) by mouth every 4 hours as needed for moderate to severe pain   Quantity:  120 capsule   Refills:  0       * Notice:  This list has 2 medication(s) that are the same as other medications prescribed for you. Read the directions carefully, and ask your doctor or other care provider to review them with you.         Where to get your medicines      Some of these will need a paper prescription and others can be bought over the counter.  Ask your nurse if you have questions.     Bring a paper prescription for each of these medications     oxyCODONE 10 MG 12 hr tablet    oxyCODONE 5 MG capsule                Primary Care Provider Office Phone # Fax #    Jaguar Becker [phone]       [facility] [address] [facility] [address]        Equal Access to Services     SARAH DUNN : Cristina spiveyo Soshu, waaxda luqadaha, qaybta kaalmada adeegyada, bhargav willis. So [facility] [phone].    ATENCIÓN: Si habla español, tiene a cid disposición servicios gratuitos de asistencia lingüística. Llsher al [phone].    We comply with applicable federal civil rights laws and [place] laws. We do not discriminate on the basis of race, color, national origin, age, disability, sex, sexual orientation, or gender identity.            Thank you!     Thank you for choosing [facility]  for your care. Our goal is always to provide you with excellent care. Hearing back  from our patients is one way we can continue to improve our services. Please take a few minutes to complete the written survey that you may receive in the mail after your visit with us. Thank you!             Your Updated Medication List - Protect others around you: Learn how to safely use, store and throw away your medicines at www.disposemymeds.org.          This list is accurate as of 2/15/18 12:37 PM.  Always use your most recent med list.                   Brand Name Dispense Instructions for use Diagnosis    amylase-lipase-protease 16510 UNITS Cpep    CREON    240 capsule    Take 2 capsules (72,000 Units) by mouth 3 times daily (with meals) And 1 capsule with each snack tid.    Diarrhea, unspecified type, Peritoneal carcinomatosis (H), Cancer of sigmoid colon (H)       diphenoxylate-atropine 2.5-0.025 MG per tablet    LOMOTIL    240 tablet    Take 1-2 tablets by mouth 4 times daily as needed for diarrhea    Diarrhea due to drug       IRON SUPPLEMENT PO      Take 325 mg by mouth 2 times daily (with meals)        loperamide 2 MG capsule    IMODIUM    240 capsule    Take 2 capsules (4 mg) by mouth 4 times daily as needed for diarrhea    Diarrhea, unspecified type       LORazepam 0.5 MG tablet    ATIVAN    30 tablet    Take 1 tablet (0.5 mg) by mouth every 4 hours as needed (Anxiety, Nausea/Vomiting or Sleep)    Peritoneal carcinomatosis (H), Cancer of sigmoid colon (H)       omeprazole 20 MG CR capsule    priLOSEC    30 capsule    Take 1 capsule (20 mg) by mouth daily    Diarrhea, unspecified type       ondansetron 8 MG ODT tab    ZOFRAN-ODT    30 tablet    Take 1 tablet (8 mg) by mouth every 8 hours    Nausea       opium tincture tincture     72 mL    Take 0.6 mLs (6 mg) by mouth 4 times daily    Diarrhea, unspecified type, Cancer of sigmoid colon (H), Peritoneal carcinomatosis (H)       * oxyCODONE 10 MG 12 hr tablet    OXYCONTIN    120 tablet    Take 2 tablets (20 mg) by mouth every 12 hours    Peritoneal  carcinomatosis (H), Cancer of sigmoid colon (H)       * oxyCODONE 5 MG capsule    OXY-IR    120 capsule    Take 1-2 capsules (5-10 mg) by mouth every 4 hours as needed for moderate to severe pain    Peritoneal carcinomatosis (H), Cancer of sigmoid colon (H)       Psyllium 51.7 % Pack    METAMUCIL FIBER    90 each    Take 1 packet by mouth 3 times daily    Diarrhea, unspecified type       * Notice:  This list has 2 medication(s) that are the same as other medications prescribed for you. Read the directions carefully, and ask your doctor or other care provider to review them with you.

## 2018-02-15 NOTE — LETTER
2/15/2018      RE: Sebas Lopez  [address] [place] [address]       Oncology follow up visit:  Date on this visit: 2/15/2018         CC  sigmoid adenocarcinoma with peritoneal carcinomatosis    Primary Physician: Waylon Han     History Of Present Illness:     Please see previous note for details. I have copied and updated from prior notes.   Sebas is a 52-year-old male who has a history of ulcerative colitis diagnosed more than 20 years ago, but he has not had medical management for that.  He was doing well, but in 05/2017 he presented with a few weeks of abdominal pain.  At that time, his imaging showed that he had a sigmoid wall thickening with adjacent mesenteric lymphadenopathy and free fluid near the abdominal wall mesh from his prior hernia surgery.  He subsequently underwent a colonoscopy which was incomplete and showed an obstructing sigmoid colon mass.  The biopsy from the sigmoid mass showed sigmoid adenocarcinoma.  He then developed obstructive symptoms and underwent exploratory laparotomy on 07/10/2017.  During that he was found to have diffuse peritoneal carcinomatosis.  Extensive lysis of adhesions was performed and a small bowel resection was performed with end ileostomy.  The biopsies from the multiple large peritoneal metastasis also were positive for metastatic adenocarcinoma. We still haven't received testing on MSI or NGS panel back     He started palliative FOLFOX on 8/11/17. C#6 given on 10/26/17  After 6 cycles, he has stable disease    On 12/6/17, he had Diagnostic laparoscopy and Exploratory laparotomy, but HIPEC was not performed as Peritoneal cancer index was 26 with diffuse involvement of the small bowel as well as the small bowel mesentery.     He then presented to ED on 1/26 with abdominal pain and associated nausea. CT A/P on 1/26 with 5.2 x 4.5 x 3.6 cm proximal sigmoid mass causnig colonic obstriction with singificant distention of cecum (7cm in diameter),  ascending colon and proximal transverse colon. No pneumatosis or pneumoperitoneum. Also small volume of ascites with associated findings concerning for peritoneal carcinomatosis, increased from prior studies. Dr. Dudley was consulted and recommended no surgical intervention.    He was then seen in clinic on 1/29/2018 as well as yesterday because for the last few days he was unable to tolerate solid foods and he was getting abdominal cramping and some nausea. He also noticed that his ostomy output decreased. He was given IV fluids as well as antibiotics and yesterday he was started on OxyContin 10 mg twice a day along with p.r.n. oxycodone. Of note a few weeks ago he was started on short acting octreotide to decrease the ostomy output but he stopped taking it about one week ago and few days prior to that he noticed worsening of the abdominal cramping.    Interim history  He was admitted to the hospital with worsening abdominal pain and a repeat CT scan on 2/5/2018 demonstrated obstructing sigmoid colon mass with progression of the colonic distention. At that time he had colonic stent placement which significantly improved his symptoms. At that time his white blood cell count was also very elevated but otherwise there was no evidence of infection so antibiotics were not given to him and the white blood cell count improved after the stent placement. He was discharged home with TPN.  He tells me that he is doing much better now. He has some nausea for which he takes Zofran. Denies vomiting. Abdominal pain is also under much better control. He is taking OxyContin 10 mg twice a day and he takes oxycodone on average 8 tablets every day. He is able to tolerate a soft diet as well as fluid intake. TPN is running for 24 hours. Ostomy is working well. Denies any bleeding. He denies any new pain or new swellings. He does not have neuropathy currently. Denies any fevers or infections. Energy is improving. No trouble breathing.  Overall he feels much better as compared to last time.      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      HERNIA REPAIR       INSERT PORT VASCULAR ACCESS Right 8/10/2017    Procedure: INSERT PORT VASCULAR ACCESS;  Single Lumen Right Chest Power Port;  Surgeon: Malena Andrew PA-C;  [facility]     LAPAROSCOPY DIAGNOSTIC (GENERAL) N/A 12/6/2017    Procedure: LAPAROSCOPY DIAGNOSTIC (GENERAL);  Diagnostic Laparoscopy, Exploratory Laparotomy Anesthesia Block ;  Surgeon: Rob Dudley MD;  Location: [facility]     LAPAROTOMY EXPLORATORY N/A 12/6/2017    Procedure: LAPAROTOMY EXPLORATORY;;  Surgeon: Rob Dudley MD;  Location: [facility]     ORTHOPEDIC SURGERY Left     HIP ARTHROPLASTY      Ulcerative colitis.  Left hip replacement.       Cancer History:   As above    Allergies:  Allergies as of 02/15/2018     (No Known Allergies)     Current Medications:  Current Outpatient Prescriptions   Medication Sig Dispense Refill     oxyCODONE (OXYCONTIN) 10 MG  12 hr tablet Take 2 tablets (20 mg) by mouth every 12 hours 120 tablet 0     oxyCODONE (OXY-IR) 5 MG capsule Take 1-2 capsules (5-10 mg) by mouth every 4 hours as needed for moderate to severe pain 120 capsule 0     ondansetron (ZOFRAN-ODT) 8 MG ODT tab Take 1 tablet (8 mg) by mouth every 8 hours 30 tablet 1     omeprazole (PRILOSEC) 20 MG CR capsule Take 1 capsule (20 mg) by mouth daily 30 capsule 11     loperamide (IMODIUM) 2 MG capsule Take 2 capsules (4 mg) by mouth 4 times daily as needed for diarrhea (Patient not taking: Reported on 2/1/2018) 240 capsule 11     opium tincture tincture Take 0.6 mLs (6 mg) by mouth 4 times daily (Patient not taking: Reported on 2/1/2018) 72 mL 0     diphenoxylate-atropine (LOMOTIL) 2.5-0.025 MG per tablet Take 1-2 tablets by mouth 4 times daily as needed for diarrhea (Patient not taking: Reported on 1/31/2018) 240 tablet 5     amylase-lipase-protease (CREON) 31524 UNITS CPEP Take 2 capsules (72,000 Units) by mouth 3 times daily (with meals) And 1 capsule with each snack tid. (Patient not taking: Reported on 1/31/2018) 240 capsule 3     LORazepam (ATIVAN) 0.5 MG tablet Take 1 tablet (0.5 mg) by mouth every 4 hours as needed (Anxiety, Nausea/Vomiting or Sleep) (Patient not taking: Reported on 2/1/2018) 30 tablet 5     Psyllium (METAMUCIL FIBER) 51.7 % PACK Take 1 packet by mouth 3 times daily (Patient not taking: Reported on 2/1/2018) 90 each 3     Ferrous Sulfate (IRON SUPPLEMENT PO) Take 325 mg by mouth 2 times daily (with meals)         Family History:  Family History   Problem Relation Age of Onset     Hypertension Father      DIABETES Father       No family history of cancer.  He does not have any kids.       Social History:  Social History     Social History     Marital status: Single     Spouse name: N/A     Number of children: N/A     Years of education: N/A     Occupational History     Not on file.     Social History Main Topics     Smoking status: Never Smoker      "Smokeless tobacco: Never Used     Alcohol use 3.0 oz/week     5 Cans of beer per week      Comment: none for last 4 months     Drug use: No     Sexual activity: Not on file     Other Topics Concern     Not on file     Social History Narrative   He does not smoke and does not drink.  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  He lives with his girlfriend, Bessie.       Physical Exam:  /65  Pulse 102  Temp 97.6  F (36.4  C) (Oral)  Resp 16  Ht 1.753 m (5' 9.02\")  Wt 61.6 kg (135 lb 11.2 oz)  SpO2 99%  BMI 20.03 kg/m2  CONSTITUTIONAL: He looks much better today and is in no acute distress  EYES: PERRLA, mild palor no icterus.   ENT/MOUTH: no mouth lesions. Ears normal  CVS: s1s2 no m r g .   RESPIRATORY: clear to auscultation b/l  GI: Abdomen is soft. Ostomy site is clean. There is mild tenderness along the left side of the ostomy but no guarding or rigidity or rebound. No hepatosplenomegaly.   NEURO: AAOX3  Grossly non focal neuro exam  INTEGUMENT: no obvious rashes  LYMPHATIC: no palpable cervical, supraclavicular, axillary LAD  MUSCULOSKELETAL: Unremarkable. No bony tenderness.   EXTREMITIES: no edema  PSYCH: Mentation, mood and affect are normal. Decision making capacity is intact        Laboratory/Imaging Studies    Reviewed    NGS PANEL COLORECTAL    No mutations were identified in analyzed regions of KRAS, NRAS, BRAF, HRAS, or PIK3CA.       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  The sigmoid carcinoma is obstructing, requiring exploratory laparotomy and end ileostomy along with small bowel resection.    MSI intact and NGS panel does not reveal any identifiable mutations     He has been started on FOLFOX palliative chemotherapy. After 6 cycles, he had stable disease      On 12/6/17, he had Diagnostic laparoscopy and Exploratory laparotomy, but HIPEC was not performed as Peritoneal cancer index was 26 with diffuse involvement of the small " bowel as well as the small bowel mesentery.     Repeat CT scan showed worsening peritoneal carcinomatosis and he is getting more symptomatic in terms of worsening abdominal pain and difficulty eating because of worsening abdominal cramping. He was admitted to the hospital with worsening colon distention and colon stent was placed which improved his symptoms. Now he is doing much better. We will resume FOLFOX next week.   Because of previous neuropathy I will decrease the dose of oxaliplatin to 70 mg/m .   I will consider adding Avastin later on. KRAS is wild type so he can be a candidate for anti-EGFR therapy as well. At this time I do not want to introduce other medications because I want to see some stability before introducing additional drugs     I plan to give him 4-6 cycles of FOLFOX before repeat imaging.      Abdominal pain due to peritoneal carcinomatosis and colon obstruction. This is much better after placement of colonic stent. Because he is using oxycodone on on average 8 tablets a day I will increase OxyContin to 20 mg twice a day and gave him oxycodone as needed. He will follow with palliative care.    Nausea. This is mild and he will continue to take Zofran and other anti-emetics.    Nutrition. This is improved after colonic stent placement. He is able to tolerate soft food and fluids. He is also on TPN. He will closely follow with nutritionist. We might have to disconnect TPN for a couple of days when 5-FU infusion is running.    He will resume FOLFOX next week.  2 weeks after that he will return to the clinic with labs and see a provider and continue with FOLFOX.    I answered all of his questions to his satisfaction and he is agreeable and comfortable with the plan             Albert Long MD

## 2018-02-15 NOTE — PROGRESS NOTES
Oncology follow up visit:  Date on this visit: 2/15/2018         CC  sigmoid adenocarcinoma with peritoneal carcinomatosis    Primary Physician: Waylon Han     History Of Present Illness:     Please see previous note for details. I have copied and updated from prior notes.   Sebas is a 52-year-old male who has a history of ulcerative colitis diagnosed more than 20 years ago, but he has not had medical management for that.  He was doing well, but in 05/2017 he presented with a few weeks of abdominal pain.  At that time, his imaging showed that he had a sigmoid wall thickening with adjacent mesenteric lymphadenopathy and free fluid near the abdominal wall mesh from his prior hernia surgery.  He subsequently underwent a colonoscopy which was incomplete and showed an obstructing sigmoid colon mass.  The biopsy from the sigmoid mass showed sigmoid adenocarcinoma.  He then developed obstructive symptoms and underwent exploratory laparotomy on 07/10/2017.  During that he was found to have diffuse peritoneal carcinomatosis.  Extensive lysis of adhesions was performed and a small bowel resection was performed with end ileostomy.  The biopsies from the multiple large peritoneal metastasis also were positive for metastatic adenocarcinoma. We still haven't received testing on MSI or NGS panel back     He started palliative FOLFOX on 8/11/17. C#6 given on 10/26/17  After 6 cycles, he has stable disease    On 12/6/17, he had Diagnostic laparoscopy and Exploratory laparotomy, but HIPEC was not performed as Peritoneal cancer index was 26 with diffuse involvement of the small bowel as well as the small bowel mesentery.     He then presented to ED on 1/26 with abdominal pain and associated nausea. CT A/P on 1/26 with 5.2 x 4.5 x 3.6 cm proximal sigmoid mass causnig colonic obstriction with singificant distention of cecum (7cm in diameter), ascending colon and proximal transverse colon. No pneumatosis or pneumoperitoneum. Also  small volume of ascites with associated findings concerning for peritoneal carcinomatosis, increased from prior studies. Dr. Dudley was consulted and recommended no surgical intervention.    He was then seen in clinic on 1/29/2018 as well as yesterday because for the last few days he was unable to tolerate solid foods and he was getting abdominal cramping and some nausea. He also noticed that his ostomy output decreased. He was given IV fluids as well as antibiotics and yesterday he was started on OxyContin 10 mg twice a day along with p.r.n. oxycodone. Of note a few weeks ago he was started on short acting octreotide to decrease the ostomy output but he stopped taking it about one week ago and few days prior to that he noticed worsening of the abdominal cramping.    Interim history  He was admitted to the hospital with worsening abdominal pain and a repeat CT scan on 2/5/2018 demonstrated obstructing sigmoid colon mass with progression of the colonic distention. At that time he had colonic stent placement which significantly improved his symptoms. At that time his white blood cell count was also very elevated but otherwise there was no evidence of infection so antibiotics were not given to him and the white blood cell count improved after the stent placement. He was discharged home with TPN.  He tells me that he is doing much better now. He has some nausea for which he takes Zofran. Denies vomiting. Abdominal pain is also under much better control. He is taking OxyContin 10 mg twice a day and he takes oxycodone on average 8 tablets every day. He is able to tolerate a soft diet as well as fluid intake. TPN is running for 24 hours. Ostomy is working well. Denies any bleeding. He denies any new pain or new swellings. He does not have neuropathy currently. Denies any fevers or infections. Energy is improving. No trouble breathing. Overall he feels much better as compared to last time.      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      HERNIA REPAIR       INSERT PORT VASCULAR ACCESS Right 8/10/2017    Procedure: INSERT PORT VASCULAR ACCESS;  Single Lumen Right Chest Power Port;  Surgeon: Malena Andrew PA-C;  [facility]     LAPAROSCOPY DIAGNOSTIC (GENERAL) N/A 12/6/2017    Procedure: LAPAROSCOPY DIAGNOSTIC (GENERAL);  Diagnostic Laparoscopy, Exploratory Laparotomy Anesthesia Block ;  Surgeon: Rob Dudley MD;  Location: [facility]     LAPAROTOMY EXPLORATORY N/A 12/6/2017    Procedure: LAPAROTOMY EXPLORATORY;;  Surgeon: Rob Dudley MD;  Location: [facility]     ORTHOPEDIC SURGERY Left     HIP ARTHROPLASTY      Ulcerative colitis.  Left hip replacement.       Cancer History:   As above    Allergies:  Allergies as of 02/15/2018     (No Known Allergies)     Current Medications:  Current Outpatient Prescriptions   Medication Sig Dispense Refill     oxyCODONE (OXYCONTIN) 10 MG 12 hr tablet Take 2 tablets (20 mg) by mouth every 12 hours 120 tablet 0      oxyCODONE (OXY-IR) 5 MG capsule Take 1-2 capsules (5-10 mg) by mouth every 4 hours as needed for moderate to severe pain 120 capsule 0     ondansetron (ZOFRAN-ODT) 8 MG ODT tab Take 1 tablet (8 mg) by mouth every 8 hours 30 tablet 1     omeprazole (PRILOSEC) 20 MG CR capsule Take 1 capsule (20 mg) by mouth daily 30 capsule 11     loperamide (IMODIUM) 2 MG capsule Take 2 capsules (4 mg) by mouth 4 times daily as needed for diarrhea (Patient not taking: Reported on 2/1/2018) 240 capsule 11     opium tincture tincture Take 0.6 mLs (6 mg) by mouth 4 times daily (Patient not taking: Reported on 2/1/2018) 72 mL 0     diphenoxylate-atropine (LOMOTIL) 2.5-0.025 MG per tablet Take 1-2 tablets by mouth 4 times daily as needed for diarrhea (Patient not taking: Reported on 1/31/2018) 240 tablet 5     amylase-lipase-protease (CREON) 69572 UNITS CPEP Take 2 capsules (72,000 Units) by mouth 3 times daily (with meals) And 1 capsule with each snack tid. (Patient not taking: Reported on 1/31/2018) 240 capsule 3     LORazepam (ATIVAN) 0.5 MG tablet Take 1 tablet (0.5 mg) by mouth every 4 hours as needed (Anxiety, Nausea/Vomiting or Sleep) (Patient not taking: Reported on 2/1/2018) 30 tablet 5     Psyllium (METAMUCIL FIBER) 51.7 % PACK Take 1 packet by mouth 3 times daily (Patient not taking: Reported on 2/1/2018) 90 each 3     Ferrous Sulfate (IRON SUPPLEMENT PO) Take 325 mg by mouth 2 times daily (with meals)         Family History:  Family History   Problem Relation Age of Onset     Hypertension Father      DIABETES Father       No family history of cancer.  He does not have any kids.       Social History:  Social History     Social History     Marital status: Single     Spouse name: N/A     Number of children: N/A     Years of education: N/A     Occupational History     Not on file.     Social History Main Topics     Smoking status: Never Smoker     Smokeless tobacco: Never Used     Alcohol use 3.0 oz/week     5 Cans of beer per  "week      Comment: none for last 4 months     Drug use: No     Sexual activity: Not on file     Other Topics Concern     Not on file     Social History Narrative   He does not smoke and does not drink.  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  He lives with his girlfriend, Bessie.       Physical Exam:  /65  Pulse 102  Temp 97.6  F (36.4  C) (Oral)  Resp 16  Ht 1.753 m (5' 9.02\")  Wt 61.6 kg (135 lb 11.2 oz)  SpO2 99%  BMI 20.03 kg/m2  CONSTITUTIONAL: He looks much better today and is in no acute distress  EYES: PERRLA, mild palor no icterus.   ENT/MOUTH: no mouth lesions. Ears normal  CVS: s1s2 no m r g .   RESPIRATORY: clear to auscultation b/l  GI: Abdomen is soft. Ostomy site is clean. There is mild tenderness along the left side of the ostomy but no guarding or rigidity or rebound. No hepatosplenomegaly.   NEURO: AAOX3  Grossly non focal neuro exam  INTEGUMENT: no obvious rashes  LYMPHATIC: no palpable cervical, supraclavicular, axillary LAD  MUSCULOSKELETAL: Unremarkable. No bony tenderness.   EXTREMITIES: no edema  PSYCH: Mentation, mood and affect are normal. Decision making capacity is intact        Laboratory/Imaging Studies    Reviewed    NGS PANEL COLORECTAL    No mutations were identified in analyzed regions of KRAS, NRAS, BRAF, HRAS, or PIK3CA.       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  The sigmoid carcinoma is obstructing, requiring exploratory laparotomy and end ileostomy along with small bowel resection.    MSI intact and NGS panel does not reveal any identifiable mutations     He has been started on FOLFOX palliative chemotherapy. After 6 cycles, he had stable disease      On 12/6/17, he had Diagnostic laparoscopy and Exploratory laparotomy, but HIPEC was not performed as Peritoneal cancer index was 26 with diffuse involvement of the small bowel as well as the small bowel mesentery.     Repeat CT scan showed worsening " peritoneal carcinomatosis and he is getting more symptomatic in terms of worsening abdominal pain and difficulty eating because of worsening abdominal cramping. He was admitted to the hospital with worsening colon distention and colon stent was placed which improved his symptoms. Now he is doing much better. We will resume FOLFOX next week.   Because of previous neuropathy I will decrease the dose of oxaliplatin to 70 mg/m .   I will consider adding Avastin later on. KRAS is wild type so he can be a candidate for anti-EGFR therapy as well. At this time I do not want to introduce other medications because I want to see some stability before introducing additional drugs     I plan to give him 4-6 cycles of FOLFOX before repeat imaging.      Abdominal pain due to peritoneal carcinomatosis and colon obstruction. This is much better after placement of colonic stent. Because he is using oxycodone on on average 8 tablets a day I will increase OxyContin to 20 mg twice a day and gave him oxycodone as needed. He will follow with palliative care.    Nausea. This is mild and he will continue to take Zofran and other anti-emetics.    Nutrition. This is improved after colonic stent placement. He is able to tolerate soft food and fluids. He is also on TPN. He will closely follow with nutritionist. We might have to disconnect TPN for a couple of days when 5-FU infusion is running.    He will resume FOLFOX next week.  2 weeks after that he will return to the clinic with labs and see a provider and continue with FOLFOX.    I answered all of his questions to his satisfaction and he is agreeable and comfortable with the plan    Albert Long

## 2018-02-15 NOTE — PATIENT INSTRUCTIONS
Resume FOLFOX on 2/21 as scheduled  No need to see a provider then    3/7- RTC with labs and see provider and FOLFOX    Increase oxycontin to 20 mg twice daily _ prescription given    Cont Oxycodone 5-10 mg every 4 hrs as needed _ prescription given

## 2018-02-16 NOTE — TELEPHONE ENCOUNTER
Placed call to [facility] to discuss the need to place a PICC line for TPN. Please see notes below.  Left detailed voice mail message and requested a return call to the triage line at [phone]

## 2018-02-16 NOTE — TELEPHONE ENCOUNTER
"----- Message from Albert Long MD sent at 2/16/2018  8:35 AM CST -----  Regarding: RE: TPN and Folfox on 2/21  I am OK with PICC as well. Hannah can u ask Sebas and if he is OK then we can order this    Thanks  Albert Long    ----- Message -----     [facility]     Sent: 2/15/2018   4:15 PM       To: Albert Long MD, Michelle Cherry, RN, #  Subject: RE: TPN and Folfox on 2/21                       Hi Rachel    Thanks for letting us know the plan for FOLFOX beginning 2/21.    Sebas has a single lumen port and unfortunately, Flurouroracil is incompatible with TPN/Lipids so he would need another IV access (PICC) placed in order to continue TPN/Lipids while receiving FOLFOX.    Per our conversation with Sebas yesterday, he said he is eating better (small amounts throughout the day), but more concerning is that he has had a significant corresponding increase in his ileostomy outputs (describes it in the \"gallons\").  This fluid loss is reflected in his labs this week (Na and Cl low, Creat up). As such, we've increased fluid in his TPN to 2 liters/day, increased sodium and chloride.     - We have concerns about his ability to stay hydrated if TPN is held for 2 days to allow for FOLFOX.    - Is it possible to arrange for placement of PICC for TPN?  The port could then be utilized for chemo. This has been done for some of our other patients and was quite successful.    Rachel Gann [facility], [facility]  Clinical Pharmacist  [facility] Nutrition Support  [phone]  ----- Message -----     From: Navi Wright     Sent: 2/15/2018   4:04 PM       To: Albert Long MD, Michelle Cherry, RN, #  Subject: RE: TPN and Folfox on 2/21                       Good afternoon,     I am replying and adding our clinical team that is following the patient and his TPN, they will address this message.    Thank you!  ----- Message -----     From: Rachel Calzada RN     Sent: 2/15/2018   3:26 PM       To: Albert Long MD, Michelle " Dilip RN, #  Subject: TPN and Folfox on 2/21                           Vicente is scheduled for Folfox on 2/21/18.  He saw Dr. Long in clinic today.  Vicente has been on TPN since his discharge from the hospital on 2/9/18.    Dr. Long said that Vicente has been tolerating oral intake at home. He  thought we would have to hold the TPN for the chemotherapy infusion and 5FU pump.      Vicente said that his oral intake at home has been pretty good - getting in about 60 oz of fluids per day and about 2400 - 2600 calories per day.    Rachel  (covering for Kemi)

## 2018-02-16 NOTE — PROGRESS NOTES
This is a recent snapshot of the patient's [facility] medical record.  For current drug dose and complete information and questions, call [phone] or In [facility] pool, [facility] ([id number])  CSN Number:  [id number]

## 2018-02-16 NOTE — PROGRESS NOTES
This is a recent snapshot of the patient's [facility] medical record.  For current drug dose and complete information and questions, call [phone] or In [facility] pool, [facility] home infusion ([id number])  CSN Number:  [id number]

## 2018-02-19 NOTE — PROGRESS NOTES
Therapy: TPN  Insurance: [employer]  Ded: $0  Co-Insurance: 75%  Max Out of Pocket: $4500  Met: $220    Please contact Intake with any questions, [phone] or In Basket pool, [facility] ([id number]).    [facility] 7D: in reference to admission date 02/05/2018 to check IV coverage.

## 2018-02-19 NOTE — PROGRESS NOTES
This is a recent snapshot of the patient's [facility] medical record.  For current drug dose and complete information and questions, call [phone] or In [facility] pool, [facility] home infusion ([id number])  CSN Number:  [id number]

## 2018-02-20 PROBLEM — K52.9 COLITIS PRESUMED INFECTIOUS: Status: ACTIVE | Noted: 2018-01-01

## 2018-02-20 NOTE — ED PROVIDER NOTES
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 He was recently admitted to our hospital from 2/5/18-2/9/18 with fatigue, leukocytosis, hyponatremia, abdominal pain, and nausea/vomiting. He was found to have large colonic mass and colonic distention on CT scan. He had a colonic stent placed on 2/6/18 with symptom improvement. He was started on TPN due to lack of oral intake previously. He is also status post ostomy placement. His last palliative chemotherapy was discontinued on 10/26/17 with stable disease; however, with oncology follow-up on 2/15/18 it was decided he would start palliative chemotherapy again on 2/21/18 (tomorrow).     Today, the patient reports last night he was feeling generally unwell before administering his TPN.  He states after administering the TPN he took his temperature and found it to be 100 2.4F.  He went to the [place] emergency department for evaluation.  Per chart review, ED sepsis order set was initiated given his complex medical and surgical history.  Labs, chest ray, and UA returned all unremarkable or near baseline.  His white blood cell count was normal and his lactate was not elevated.  They did discuss recommendation to transfer to the [facility] for observation overnight, but he was reluctant and agreed to closely monitor his symptoms overnight.  His fever did improve with IV fluids.  He did not have a flu swab. He states this morning he woke up feeling generally unwell again and was found to be febrile.  He states in addition he has had some myalgias, nausea, and now headache.  He denies any recent cough or rhinorrhea.  He denies any recent leg swelling.  He has no history of DVT or PE.  He denies any abdominal pain above baseline.  He denies any chest pain or shortness of  breath.    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      HERNIA REPAIR       INSERT PORT VASCULAR ACCESS Right 8/10/2017    Procedure: INSERT PORT VASCULAR ACCESS;  Single Lumen Right Chest Power Port;  Surgeon: Malena Andrew PA-C;  [facility]     LAPAROSCOPY DIAGNOSTIC (GENERAL) N/A 12/6/2017    Procedure: LAPAROSCOPY DIAGNOSTIC (GENERAL);  Diagnostic Laparoscopy, Exploratory Laparotomy Anesthesia Block ;  Surgeon: Rob Dudley MD;  Location: [facility] OR     LAPAROTOMY EXPLORATORY N/A 12/6/2017    Procedure: LAPAROTOMY EXPLORATORY;;  Surgeon: Rob Dudley MD;  Location: [facility] OR     ORTHOPEDIC SURGERY Left     HIP ARTHROPLASTY       Family History   Problem Relation Age of Onset     Hypertension Father      DIABETES Father        Social History   Substance Use Topics     Smoking status: Never Smoker     Smokeless tobacco: Never Used     Alcohol use 3.0 oz/week     5 Cans of beer per week      Comment: none for last 4 months       Current Facility-Administered Medications   Medication     lactated ringers infusion     vancomycin (VANCOCIN) 1,250 mg in sodium chloride 0.9 % 250 mL  intermittent infusion     sodium chloride 0.9 % bag 500mL for CT scan flush use     Current Outpatient Prescriptions   Medication     oxyCODONE (OXYCONTIN) 10 MG 12 hr tablet     oxyCODONE (OXY-IR) 5 MG capsule     ondansetron (ZOFRAN-ODT) 8 MG ODT tab     omeprazole (PRILOSEC) 20 MG CR capsule     loperamide (IMODIUM) 2 MG capsule     opium tincture tincture     diphenoxylate-atropine (LOMOTIL) 2.5-0.025 MG per tablet     amylase-lipase-protease (CREON) 95008 UNITS CPEP     LORazepam (ATIVAN) 0.5 MG tablet     Psyllium (METAMUCIL FIBER) 51.7 % PACK     Ferrous Sulfate (IRON SUPPLEMENT PO)      No Known Allergies    I have reviewed the Medications, Allergies, Past Medical and Surgical History, and Social History in the Epic system.    Review of Systems   Constitutional: Positive for fever.   HENT: Negative for rhinorrhea.    Respiratory: Negative for cough and shortness of breath.    Cardiovascular: Negative for chest pain and leg swelling.   Gastrointestinal: Negative for abdominal pain.   Musculoskeletal: Positive for myalgias.   Neurological: Positive for headaches.   All other systems reviewed and are negative.      Physical Exam   BP: 106/65  Heart Rate: 99  Temp: 99  F (37.2  C)  SpO2: 100 %      Physical Exam  General: Awake, alert, nontoxic appearing, but appears frail and chronically ill  Head: normal cephalic, mucous membranes are moist, neck is supple  HEENT: pupils equal, conjugate gaze in tact  Neck: Supple  CV: tachycardic rate, no murmur  Lungs: clear to ascultation bilaterally without rales rhonchi or wheezing  Abd: soft, non-tender, no guarding, no peritoneal signs, ostomy site is clean, dry, and intact without cellulitis or erythema  EXT: lower extremities without swelling or edema  Back: No flank tenderness bilaterally  Neuro: awake, answers questions appropriately. No focal deficits noted   ED Course     ED Course     Procedures          Labs Ordered and Resulted from Time of ED Arrival Up to the  Time of Departure from the ED   LACTIC ACID WHOLE BLOOD - Abnormal; Notable for the following:        Result Value    Lactic Acid 2.1 (*)     All other components within normal limits   CBC WITH PLATELETS DIFFERENTIAL - Abnormal; Notable for the following:     WBC 17.5 (*)     RBC Count 3.20 (*)     Hemoglobin 9.0 (*)     Hematocrit 28.0 (*)     RDW 16.4 (*)     Platelet Count 637 (*)     Absolute Neutrophil 15.4 (*)     All other components within normal limits   COMPREHENSIVE METABOLIC PANEL - Abnormal; Notable for the following:     Sodium 131 (*)     Carbon Dioxide 19 (*)     Glucose 116 (*)     Calcium 8.1 (*)     Albumin 2.4 (*)     Alkaline Phosphatase 220 (*)     All other components within normal limits   ROUTINE UA WITH MICROSCOPIC - Abnormal; Notable for the following:     Bacteria Urine Few (*)     Mucous Urine Present (*)     All other components within normal limits   ISTAT  GASES LACTATE SOCO POCT - Abnormal; Notable for the following:     PCO2 Venous 36 (*)     All other components within normal limits   PHOSPHORUS   PULSE OXIMETRY NURSING   CARDIAC CONTINUOUS MONITORING   ISTAT CG4 GASES LACTATE SOCO NURSING POCT   MEASURE URINE OUTPUT   PATIENT CARE ORDER   URINE CULTURE AEROBIC BACTERIAL   BLOOD CULTURE   BLOOD CULTURE   INFLUENZA A AND B AND RSV PCR            Assessments & Plan (with Medical Decision Making)   52-year-old male with metastatic adenocarcinoma of the sigmoid who presents with fever.  He is not technically immunosuppressed as he has not recently been on chemotherapy. Patient has a temperature of 99 here, he is slightly hypotensive and slightly tachycardic, but is otherwise well-appearing. Initial evaluation is looking for sources of infection including CBC, chest x-ray, urine, and influenza swab.    Initial lactate came back at 2.1, which is just mildly above upper limit of normal; however in light of his vital signs and elevated white count and underlying medical condition, I did  feel that it was prudent to do the full sepsis protocol with 20 mgs/kg IV fluids, empiric antibiotics after obtaining all cultures. He was ordered Vancomycin and Zosyn initially. On repeat check lactate normalized, chest x-ray and urine did not appear to show sources so at this point I obtained a CT scan of his abdomen which did appear to have a inflammation concerning for an infectious colitis. At this point, I think he warrants admission for IV antibiotics and stabilization of vital signs and I spoke with the admitting fellow for oncology who agreed for admission.   Repeat lactate was improved. Patient had no new complaints while in the ER and states he was feeling much better.        I have reviewed the nursing notes.    I have reviewed the findings, diagnosis, plan and need for follow up with the patient.    New Prescriptions    No medications on file       Final diagnoses:   Fever   I, Otis Aaron, am serving as a trained medical scribe to document services personally performed by Jay Preston MD, based on the provider's statements to me.      Jay PERRIN MD, was physically present and have reviewed and verified the accuracy of this note documented by Otis Aaron.       2/20/2018   [facility], EMERGENCY DEPARTMENT     Jay Preston MD  02/20/18 4209

## 2018-02-20 NOTE — PHARMACY-VANCOMYCIN DOSING SERVICE
Pharmacy Vancomycin Initial Note  Date of Service 2018  Patient's  1965  52 year old, male    Indication: Sepsis    Current estimated CrCl = Estimated Creatinine Clearance: 87.6 mL/min (based on Cr of 0.84).    Creatinine for last 3 days  2018: 10:29 AM Creatinine 0.84 mg/dL    Recent Vancomycin Level(s) for last 3 days  No results found for requested labs within last 72 hours.      Vancomycin IV Administrations (past 72 hours)      No vancomycin orders with administrations in past 72 hours.                Nephrotoxins and other renal medications (Future)    Start     Dose/Rate Route Frequency Ordered Stop    18 1150  vancomycin (VANCOCIN) 1,500 mg in sodium chloride 0.9 % 250 mL intermittent infusion      1,500 mg  over 90 Minutes Intravenous ONCE 18 1137      18 1145  piperacillin-tazobactam (ZOSYN) 4.5 g in 20 mL NS Premix Syringe      4.5 g  over 3 Minutes Intravenous ONCE 18 1133                       Contrast Orders - past 72 hours     None                Plan:  1.  Start vancomycin  1500mg IV x1 (25mg/kg), then 1250 mg IV q12h (20mg/kg). No history of vancomycin dosing in Epic.  2.  Goal Trough Level: 15-20 mg/L (sepsis of unknown origin)  3.  Pharmacy will check trough levels as appropriate in 1-3 Days.    4. Serum creatinine levels will be ordered daily for the first week of therapy and at least twice weekly for subsequent weeks.    5. Hi [place] method utilized to dose vancomycin therapy: Method 2    Sabra Freitas, PharmD

## 2018-02-20 NOTE — PHARMACY-ADMISSION MEDICATION HISTORY
Admission medication history interview status for the 2/20/2018 admission is complete. See Epic admission navigator for allergy information, pharmacy, prior to admission medications and immunization status.     Medication history interview sources:  patient    Changes made to PTA medication list (reason)  Added: none, TPN  Deleted: none (although pt reports not taking Creon, see comment below)  Changed:   -Opium tincture: reports taking PRN in the last months, states it wasn't really helping his diarrhea (in addition to other anti-diarrheal medications)    Additional medication history information (including reliability of information, actions taken by pharmacist):  -Patient was a reliable historian and also had a medication list with him.  -Patient has not received flu shot for this flu season.  -Confirmed no known allergies.   -Unsure if the patient self-discontinued the Creon or if he was still instructed to take it so kept on PTA medication list as not taking.   -Patient reports using the lomotil, loperamide, metamucil, and opium tincture in the past month for diarrhea prior to a previous hospitalization but he stopped taking them regularly as he thought they were not helping him at all. Marked as taken within the past month but did not delete.     TPN, CENTRAL LINE ONLY (through [facility] home infusion): most recent recipe from 2/20 is as follows:  Bases:  Travasol 10% = 90g/day  Dextrose = 240 g/day    Electrolytes:  Sodium = 135 mEq/day  Potassium = 70 mEq/day  Calcium = 2 mEq/day  Magnesium = 15 mEq/day  Phosphate = 25 mMol/day  Chloride = 191.6 mEq  Acetate = 94.37 mEq   (Cl:Ace 67:33)    Vitamins:   Infuvite Adult = 10 mL/day  Phytonadione = 5mg/day  MTE-5 = 3 mL/day    Daily Lipids: 250mg of Intralipid 20% (patient adds to TPN bag)    Directions: Infuse 1 bag IV daily; total volume=2450mL (2000mL + 250mL lipids + 200 mL overflow) , infuse over 24 hours, max infusion rate = 93.8 mL/hr.    Prior to Admission  medications    Medication Sig Last Dose Taking? Auth Provider   parenteral nutrition - PTA/DISCHARGE ORDER Inject into the vein daily See medication history note from 2/20/18 for most recent TPN formula.  Yes Unknown, Entered By History   oxyCODONE (OXYCONTIN) 10 MG 12 hr tablet Take 2 tablets (20 mg) by mouth every 12 hours 2/19/2018 at Unknown time Yes Albert Long MD   oxyCODONE (OXY-IR) 5 MG capsule Take 1-2 capsules (5-10 mg) by mouth every 4 hours as needed for moderate to severe pain 2/19/2018 at Unknown time Yes Albetr Long MD   ondansetron (ZOFRAN-ODT) 8 MG ODT tab Take 1 tablet (8 mg) by mouth every 8 hours 2/19/2018 at pm Yes Es Dumont APRN CNP   omeprazole (PRILOSEC) 20 MG CR capsule Take 1 capsule (20 mg) by mouth daily 2/19/2018 at am Yes Sukhdeep Mota MD   loperamide (IMODIUM) 2 MG capsule Take 2 capsules (4 mg) by mouth 4 times daily as needed for diarrhea Past Month at Unknown time Yes Sukhdeep Mota MD   opium tincture tincture Take 0.6 mLs (6 mg) by mouth 4 times daily  Patient taking differently: Take 6 mg by mouth 4 times daily as needed for diarrhea  Past Month at Unknown time Yes Linda Alves PA-C   diphenoxylate-atropine (LOMOTIL) 2.5-0.025 MG per tablet Take 1-2 tablets by mouth 4 times daily as needed for diarrhea Past Month at Unknown time Yes Linda Alves PA-C   LORazepam (ATIVAN) 0.5 MG tablet Take 1 tablet (0.5 mg) by mouth every 4 hours as needed (Anxiety, Nausea/Vomiting or Sleep) Past Week at Unknown time Yes Linda Alves PA-C   Psyllium (METAMUCIL FIBER) 51.7 % PACK Take 1 packet by mouth 3 times daily  Patient taking differently: Take 1 packet by mouth 3 times daily as needed  Past Month at Unknown time Yes Linda Alves PA-C   Ferrous Sulfate (IRON SUPPLEMENT PO) Take 325 mg by mouth 2 times daily (with meals)  2/19/2018 at pm Yes Reported, Patient   amylase-lipase-protease (CREON) 48931 UNITS CPEP Take 2 capsules (72,000 Units) by  mouth 3 times daily (with meals) And 1 capsule with each snack tid.  Patient not taking: Reported on 2/20/2018 Not Taking at Unknown time  Linda Alves PA-C       Medication history completed by: Sabra Freitas, BridgetteD

## 2018-02-20 NOTE — IP AVS SNAPSHOT
[address] [address]    Phone:  [phone]                                       After Visit Summary   2/20/2018    Sebas Lopez    MRN: [id number]           After Visit Summary Signature Page     I have received my discharge instructions, and my questions have been answered. I have discussed any challenges I see with this plan with the nurse or doctor.    ..........................................................................................................................................  Patient/Patient Representative Signature      ..........................................................................................................................................  Patient Representative Print Name and Relationship to Patient    ..................................................               ................................................  Date                                            Time    ..........................................................................................................................................  Reviewed by Signature/Title    ...................................................              ..............................................  Date                                                            Time

## 2018-02-20 NOTE — IP AVS SNAPSHOT
"    UNIT [facility]: [phone]                                              INTERAGENCY TRANSFER FORM - PHYSICIAN ORDERS   2018                    Hospital Admission Date: 2018  KARINA MINER   : 1965  Sex: Male        Attending Provider: Kaden Hardin MD     Allergies:  No Known Allergies    Infection:  None   Service:  ONCOLOGY    Ht:  1.753 m (5' 9\")   Wt:  69.1 kg (152 lb 6.4 oz)   Admission Wt:  64.9 kg (143 lb 1.6 oz)    BMI:  22.51 kg/m 2   BSA:  1.83 m 2            Patient PCP Information     Provider PCP Type    NGUYỄN HERNÁNDEZ MD General      ED Clinical Impression     Diagnosis Description Comment Added By Time Added    Fever [R50.9] Fever [R50.9]  Jay Preston MD 2018  1:45 PM      Hospital Problems as of 2018              Priority Class Noted POA    Colitis presumed infectious Medium  2018 Yes    * (Principal)Bacteremia Medium  2018 Unknown      Non-Hospital Problems as of 2018              Priority Class Noted    Peritoneal carcinomatosis (H) Medium  8/3/2017    Cancer of sigmoid colon (H) Medium  8/3/2017    Iron deficiency anemia due to chronic blood loss Medium  2017    Diarrhea, unspecified type Medium  2017    Hypokalemia Medium  2017    Large bowel obstruction Medium  2018    Colon cancer (H) Medium  2018      Code Status History     Date Active Date Inactive Code Status Order ID Comments User Context    2018 11:18 AM 2018  4:47 PM Full Code [id number]  Es Dumont APRN CNP Outpatient    2018 11:19 AM 2018 11:18 AM Full Code [id number]  Es Dmuont APRN CNP Inpatient    2017  2:12 PM 2018 11:19 AM Full Code [id number]  Freddie Tadeo MD Outpatient    2017 11:44 AM 2017  2:12 PM Full Code [id number]  Rob Dudley MD Inpatient         Medication Review      START taking        Dose / Directions Comments    vancomycin 1,500 mg   Indication: "  Bacteria in the Blood        Dose:  1500 mg   Inject 1,500 mg into the vein every 12 hours for 14 days   Refills:  0          CONTINUE these medications which may have CHANGED, or have new prescriptions. If we are uncertain of the size of tablets/capsules you have at home, strength may be listed as something that might have changed.        Dose / Directions Comments    opium tincture tincture   This may have changed:    - when to take this  - reasons to take this   Used for:  Diarrhea, unspecified type, Cancer of sigmoid colon (H), Peritoneal carcinomatosis (H)        Dose:  6 mg   Take 0.6 mLs (6 mg) by mouth 4 times daily   Quantity:  72 mL   Refills:  0        oxyCODONE 10 MG 12 hr tablet   Commonly known as:  OXYCONTIN   This may have changed:  Another medication with the same name was removed. Continue taking this medication, and follow the directions you see here.   Used for:  Peritoneal carcinomatosis (H), Cancer of sigmoid colon (H)        Dose:  20 mg   Take 2 tablets (20 mg) by mouth every 12 hours   Quantity:  120 tablet   Refills:  0        Psyllium 51.7 % Pack   Commonly known as:  METAMUCIL FIBER   This may have changed:    - when to take this  - reasons to take this   Used for:  Diarrhea, unspecified type        Dose:  1 packet   Take 1 packet by mouth 3 times daily   Quantity:  90 each   Refills:  3          CONTINUE these medications which have NOT CHANGED        Dose / Directions Comments    amylase-lipase-protease 13834 UNITS Cpep   Commonly known as:  CREON   Used for:  Diarrhea, unspecified type, Peritoneal carcinomatosis (H), Cancer of sigmoid colon (H)        Dose:  2 capsule   Take 2 capsules (72,000 Units) by mouth 3 times daily (with meals) And 1 capsule with each snack tid.   Quantity:  240 capsule   Refills:  3    Mail to patient if not yet due to be filled.       diphenoxylate-atropine 2.5-0.025 MG per tablet   Commonly known as:  LOMOTIL   Used for:  Diarrhea due to drug        Dose:   1-2 tablet   Take 1-2 tablets by mouth 4 times daily as needed for diarrhea   Quantity:  240 tablet   Refills:  5        IRON SUPPLEMENT PO        Dose:  325 mg   Take 325 mg by mouth 2 times daily (with meals)   Refills:  0        LORazepam 0.5 MG tablet   Commonly known as:  ATIVAN   Used for:  Peritoneal carcinomatosis (H), Cancer of sigmoid colon (H)        Dose:  0.5 mg   Take 1 tablet (0.5 mg) by mouth every 4 hours as needed (Anxiety, Nausea/Vomiting or Sleep)   Quantity:  30 tablet   Refills:  5        omeprazole 20 MG CR capsule   Commonly known as:  priLOSEC   Used for:  Diarrhea, unspecified type        Dose:  20 mg   Take 1 capsule (20 mg) by mouth daily   Quantity:  30 capsule   Refills:  11        ondansetron 8 MG ODT tab   Commonly known as:  ZOFRAN-ODT   Used for:  Nausea        Dose:  8 mg   Take 1 tablet (8 mg) by mouth every 8 hours   Quantity:  30 tablet   Refills:  1        parenteral nutrition - PTA/DISCHARGE ORDER        Inject into the vein daily See medication history note from 2/20/18 for most recent TPN formula.   Refills:  0          STOP taking     loperamide 2 MG capsule   Commonly known as:  IMODIUM                   Referrals     Home infusion referral       Resume home TPN.  -------------------------   CVC Line       Port A Cath Single 08/10/17 Right Chest wall    PICC-to be placed prior to d/c.    ------------------------  The following homecare is recommended:  RN skilled nursing visit. RN to assess vital signs and weight, respiratory and cardiac status, pain level and activity tolerance, hydration, nutrition and bowel status and home safety.  RN to teach medication management.  RN to assist with management of TPN, IVAB and PICC.    _______________________________  [facility]  Phone  [phone]  Fax  [phone]  ______________________________             Your next 10 appointments already scheduled     Mar 01, 2018  7:15 AM CST   [employer] Lab Draw with TABATHA  [facility] LAB DRAW   [facility] ([facility])    [address]  [address]  [place] [facility] [address]   [phone]            Mar 01, 2018  7:50 AM CST   (Arrive by 7:35 AM)   Return Visit with Linda Alves PA-C   [facility] ([facility])    [address]  [address]  [place] [facility] [address]   [phone]            Mar 01, 2018  9:00 AM CST   (Arrive by 8:45 AM)   IR PICC VASCULAR with UUVAS1   [facility] ([facility])    [address]  [place] [address]              1. You will need to have had a history and physical exam within 7 days of the procedure. 2. Laboratory test are to be obtained by your doctor prior to the exam (CBCP, INR and PTT) 3. Someone will need to drive you to and from the hospital. 4. If you are or may be pregnant, contact your doctor or a Radiology nurse prior to the day of the exam. 5. If you have diabetes, check with your doctor or a Radiology nurse to see if your insulin needs to be adjusted for the exam. 6. If you are taking Coumadin (to thin you blood) please contact your doctor or a Radiology nurse at least 3 days before the exam for special instructions. 7. The day before your exam you may eat your regular diet and are encouraged to drink at least 2 quarts of clear liquids. Drink no alcoholic beverages for 24 hours prior to the exam. 8. Do not eat any solid food or milk products for 6 hours prior to the exam. You may drink clear liquids until 2 hours prior to the exam. Clear liquids include the following: water, Jell-O, clear broth, apple juice or any noncarbonated drink that you can see through (no pop!) 9. The morning of the exam you may brush your teeth and take medications as directed with a sip of water. 10. Tell the Radiology nurse if you have any allergies.            Mar 01, 2018 10:30 AM CST    Infusion 240 with [facility] ONCOLOGY INFUSION, UC 21 ATC   [facility] ([facility])    [address]  [address]  [place] [address]   [phone]            Mar 29, 2018  4:15 PM CDT   (Arrive by 4:00 PM)   Return Visit with Sukhdeep Mota MD   [facility] ([facility])    [address]  [address]  [place] [address]   [phone]              Statement of Approval     Ordered          02/25/18 1215  I have reviewed and agree with all the recommendations and orders detailed in this document.  EFFECTIVE NOW     Approved and electronically signed by:  Jay Saez MD

## 2018-02-20 NOTE — PROGRESS NOTES
Marzena faxed positive blood culture report from peripheral draw- [facility] in [facility]. Collected 2/19 at 9pm.  Pt has 2 cultures done earlier today, and is on vancomycin already.

## 2018-02-20 NOTE — PROGRESS NOTES
Patient is currently in the ED with fevers. Discussed with Dr Long who agrees patient should not have PICC line placed or start his treatment tomorrow.  All appointments will be moved out one week to give him time to recover from illness.  Spoke with OLGA LIDIA Suh in ED who agrees to notifiy patient of the change and writer will contact patient in the next day or two to update him on scheduled.  Writer requested that the phos lab needed for home TPN monitoring be drawn while in the ED, Vikash will discuss with ED MD.  Messages sent to scheduling and [facility] to update and make changes.

## 2018-02-20 NOTE — H&P
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 CT AP concerning for new thickening of colon secondary to presumed infectious colitis.    Presumed infectious colitis.  H/o ulcerative colitis.  Leukocytosis.  Fever.  Patient reports recent fever at home yesterday and today. WBC of 17.5 on admission, most recently WNL on 2/13. CT AP on admission was concerning for thickening of the ascending, transverse, and descending colon, felt most likely to represent infectious or inflammatory etiology. Of note, patient does have a history of ulcerative colitis about 20 years ago for which he was treated with steroids and mesalamine per his report. Here in the ED he was given LR and started on Vancomycin. UA/UC, BC collected. Rapid flu negative.  -Switch to Zosyn  -Will bolus with 1L LR then switch to maintenance LR at 50cc/hr  -Check enteric bacteria panel and C diff PCR  -Follow UC and BC  -Consider GI consult if proves to be non-infectious    Stage IV Sigmoid Adenocarcinoma with peritoneal carcinomatosis.  S/p ex lap 7/2017 with small bowel resection and end ileostomy. S/p 6 cycles of FOLFOX. Admitted on 12/6/2017 for ex-lap with aborted HIPEC due to diffuse carcinomatosis. Repeat CT scan showed worsening peritoneal carcinomatosis and he is getting more symptomatic in terms of worsening abdominal pain and difficulty eating because of worsening abdominal cramping. He was admitted to the hospital with worsening colon distention and colon stent was placed which improved his symptoms. Follows with Dr. Long outpatient. Planning on restarting FOLFOX this week (2/21).  -Will hold  chemo while inpatient    Cancer related abdominal pain.  Pain Assessment.  Abdominal pain has been well controlled since his previous admission 2/5-2/9 when he had a colonic stent placed. Pain is currently at baseline.  Current Pain Score 2/9/2018 2/9/2018 2/9/2018   Patient currently in pain? yes no sleeping: patient not able to self report   Pain score (0-10) - - -   Pain location Abdomen - -   Pain descriptors Discomfort - -   -Sebas is currently at his base level of chronic cancer pain. No additions to home regimen needed.  -Continue home regimen of OxyContin 20mg BID with ocyxodone 5-10mg prn     Anemia of chronic disease. Pt with Hgb of 9.0 on admission. Currently on iron supplement. No evidence of bleeding.   -Monitor, no transfusions expected while admitted.     Thrombocytosis. Likely reactive from acute infection.  -Daily CBC    FEN:  -LR bolus followed by mIVF with LR 50ml/hr  -Lyte replacement per protocol  -Regular diet as tolerated, continue home TPN    Prophy/Misc:  -GI: continue PTA omeprazole  -DVT: Lovenox ppx    Disposition: Admit to hospital treatment of infectious colitis. Discharge pending improvement in symptoms and afebrile, likely 2-3 day stay.    Ericka Lentz PA-C  Hematology/Oncology  Pager: [phone]    Code Status : Full Code    Primary Care Physician   NGUYỄN HERNÁNDEZ    History of Present Illness   History obtained from chart and discussed with the patient.    Sebas Lopez is a 52 year old male with a history of ulcerative colitis and metastatic sigmoid adenocarcinoma with diffuse peritoneal carcinomatosis who presents with a 2 days history of fever. CT AP concerning for new thickening of colon secondary to presumed infectious colitis. Sebas states he check his temp before giving himself TPN yesterday and noted to have a normal temperature. About 2 hours later his temp had elevated to about 101. He sought evaluation in the local ED in [place], [place]. Work-up there was  unrevealing. They did offer him a transfer to [facility], but he declined stating he would monitor his temperature at home. This morning, Sebas noted his temp was 102, and therefore came to the ED for evaluation. Other than the fever Sebas reports he has been doing very well since his last hospital admission. His energy level has been ok, he is climbing the stairs to the bathroom multiple times per day. No weakness or dizziness. Does nap a couple times a day. He records his calorie intake to be about 2500. He denies recent sore throat, rhinorrhea, cough, chest pain, or shortness of breath. No change to ostomy output recently, denies blood in stool. No extremity edema. No rashes, bleeding, or bruising.    Past Medical History    Past Medical History:   Diagnosis Date     Adenocarcinoma of sigmoid colon (H)     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      HERNIA REPAIR       INSERT PORT VASCULAR ACCESS Right 8/10/2017    Procedure: INSERT PORT VASCULAR ACCESS;  Single Lumen Right Chest Power Port;  Surgeon: Malena Andrew PA-C;  [facility]     LAPAROSCOPY DIAGNOSTIC (GENERAL) N/A 12/6/2017    Procedure: LAPAROSCOPY DIAGNOSTIC (GENERAL);  Diagnostic Laparoscopy,  Exploratory Laparotomy Anesthesia Block ;  Surgeon: Rob Dudley MD;  Location: [facility] OR     LAPAROTOMY EXPLORATORY N/A 12/6/2017    Procedure: LAPAROTOMY EXPLORATORY;;  Surgeon: Rob Duldey MD;  Location: [facility] OR     ORTHOPEDIC SURGERY Left     HIP ARTHROPLASTY       Prior to Admission Medications   Prior to Admission Medications   Prescriptions Last Dose Informant Patient Reported? Taking?   Ferrous Sulfate (IRON SUPPLEMENT PO)   Yes No   Sig: Take 325 mg by mouth 2 times daily (with meals)    LORazepam (ATIVAN) 0.5 MG tablet   No No   Sig: Take 1 tablet (0.5 mg) by mouth every 4 hours as needed (Anxiety, Nausea/Vomiting or Sleep)   Patient not taking: Reported on 2/1/2018   Psyllium (METAMUCIL FIBER) 51.7 % PACK   No No   Sig: Take 1 packet by mouth 3 times daily   Patient not taking: Reported on 2/1/2018   amylase-lipase-protease (CREON) 81746 UNITS CPEP   No No   Sig: Take 2 capsules (72,000 Units) by mouth 3 times daily (with meals) And 1 capsule with each snack tid.   Patient not taking: Reported on 1/31/2018   diphenoxylate-atropine (LOMOTIL) 2.5-0.025 MG per tablet   No No   Sig: Take 1-2 tablets by mouth 4 times daily as needed for diarrhea   Patient not taking: Reported on 1/31/2018   loperamide (IMODIUM) 2 MG capsule   No No   Sig: Take 2 capsules (4 mg) by mouth 4 times daily as needed for diarrhea   Patient not taking: Reported on 2/1/2018   omeprazole (PRILOSEC) 20 MG CR capsule   No No   Sig: Take 1 capsule (20 mg) by mouth daily   ondansetron (ZOFRAN-ODT) 8 MG ODT tab   Yes No   Sig: Take 1 tablet (8 mg) by mouth every 8 hours   opium tincture tincture   No No   Sig: Take 0.6 mLs (6 mg) by mouth 4 times daily   Patient not taking: Reported on 2/1/2018   oxyCODONE (OXY-IR) 5 MG capsule 2/19/2018 at Unknown time  No Yes   Sig: Take 1-2 capsules (5-10 mg) by mouth every 4 hours as needed for moderate to severe pain   oxyCODONE (OXYCONTIN) 10 MG 12 hr tablet 2/19/2018 at Unknown  time  No Yes   Sig: Take 2 tablets (20 mg) by mouth every 12 hours      Facility-Administered Medications: None     Allergies   No Known Allergies    Social History   Social History     Social History     Marital status: Single     Spouse name: N/A     Number of children: N/A     Years of education: N/A     Occupational History     Not on file.     Social History Main Topics     Smoking status: Never Smoker     Smokeless tobacco: Never Used     Alcohol use 3.0 oz/week     5 Cans of beer per week      Comment: none for last 4 months     Drug use: No     Sexual activity: Not on file     Other Topics Concern     Not on file     Social History Narrative       Family History   Family History   Problem Relation Age of Onset     Hypertension Father      DIABETES Father        Review of Systems   A 10 point ROS is negative unless otherwise noted above in the HPI.    Physical Exam   Vital Signs with Ranges  Temp:  [99  F (37.2  C)] 99  F (37.2  C)  Heart Rate:  [] 88  Resp:  [17] 17  BP: ()/(56-76) 89/61  SpO2:  [99 %-100 %] 100 %  0 lbs 0 oz    Constitutional: Pleasant and cooperative male seen reclining in bed. Awake, alert, NAD.  HEENT: NC/AT, EOMI, sclera clear, conjunctiva normal, OP with MMM  Respiratory: No increased work of breathing, CTAB, no crackles or wheezing.  Cardiovascular: RRR, no murmur noted. No peripheral edema.  GI: Normal bowel sounds, soft, non-distended. Mildly tender to palpation to left of umbilicus, with firm mass palpated. Well healed, pink midline surgical scar present.  Skin: Warm, dry, well-perfused. No bruising, bleeding, rashes, or lesions on limited exam.  Neurologic: A&O. Answers questions appropriately. Moves all extremities spontaneously.  Neuropsychiatric: Calm, appropriate affect  Vascular access:  Port on R chest and RUE PIV CDI, non-tender, no surrounding erythema.    Recent Labs  CBC     Recent Labs  Lab 02/20/18  1029   WBC 17.5*   RBC 3.20*   HGB 9.0*   HCT 28.0*   MCV  88   MCH 28.1   MCHC 32.1   RDW 16.4*   *       CMP     Recent Labs  Lab 02/20/18  1029   *   POTASSIUM 3.9   CHLORIDE 100   CO2 19*   ANIONGAP 12   *   BUN 18   CR 0.84   GFRESTIMATED >90   GFRESTBLACK >90   ANNAMARIE 8.1*   PHOS 2.6   PROTTOTAL 7.7   ALBUMIN 2.4*   BILITOTAL 0.4   ALKPHOS 220*   AST 32   ALT 49       LFTs:     Recent Labs  Lab 02/20/18  1029   PROTTOTAL 7.7   ALBUMIN 2.4*   BILITOTAL 0.4   ALKPHOS 220*   AST 32   ALT 49       Coagulation Studies: No lab results found in last 7 days.

## 2018-02-20 NOTE — TELEPHONE ENCOUNTER
Caller  States he has colon cancer and is currently not on chemo  and not neutropenic but is on TPN via port a cath; report a temperature spike of 102.4 thi evening after administering TPN  Does not have any focal symptoms at present including URI symptoms  Triage protocol reviewed   Advise ED assessment this evening  [place]; advise to go to nearest ED  ( [facility] [place] and  [facility] oncology can be consulted  Understands and will comply  Allegra Rice RN  [facility]    Reason for Disposition    [1] Fever > 100.5 F (38.1 C) AND [2] port (portacath), central line, or PICC line    Protocols used: CANCER - FEVER-ADULT-AH

## 2018-02-20 NOTE — PROGRESS NOTES
This is a recent snapshot of the patient's [facility] medical record.  For current drug dose and complete information and questions, call [phone] or In Basket pool, [facility] home infusion ([id number])  CSN Number:  [id number]

## 2018-02-20 NOTE — ED NOTES
[facility], [place]   ED Nurse to Floor Handoff     Sebas Lopez is a 52 year old male who speaks English and lives with family members,  in a home  They arrived in the ED by car from home    ED Chief Complaint: Fever    ED Dx;   Final diagnoses:   Fever         Needed?: No    Allergies: No Known Allergies.  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     Ulcerative colitis (H)       Baseline Mental status: WDL  Current Mental Status changes: at basesline    Infection: Yes  Sepsis suspected: No  Isolation type: No active isolations     Activity level - Baseline/Home:  Independent  Activity Level - Current:   Independent    Bariatric equipment needed?: No    In the ED these meds were given:   Medications   lactated ringers infusion (not administered)   vancomycin (VANCOCIN) 1,250 mg in sodium chloride 0.9 % 250 mL intermittent infusion (not administered)   sodium chloride 0.9 % bag 500mL for CT scan flush use (71 mLs As instructed Given 2/20/18 1315)   lactated ringers BOLUS 1,848 mL (0 mL/kg × 61.6 kg Intravenous Stopped 2/20/18 1236)   piperacillin-tazobactam (ZOSYN) 4.5 g in 20 mL NS Premix Syringe (4.5 g Intravenous Given 2/20/18 1232)   vancomycin (VANCOCIN) 1,500 mg in sodium chloride 0.9 % 250 mL intermittent infusion (1,500 mg Intravenous New Bag 2/20/18 1148)   iopamidol (ISOVUE-370) solution 82 mL (82 mLs Intravenous Given 2/20/18 1315)       Drips running?  No    Home pump or pre-existing LDA's present? Yes    Labs results:   Labs Ordered and Resulted from Time of ED Arrival Up to the Time of Departure from the ED   LACTIC ACID WHOLE BLOOD - Abnormal; Notable for  the following:        Result Value    Lactic Acid 2.1 (*)     All other components within normal limits   CBC WITH PLATELETS DIFFERENTIAL - Abnormal; Notable for the following:     WBC 17.5 (*)     RBC Count 3.20 (*)     Hemoglobin 9.0 (*)     Hematocrit 28.0 (*)     RDW 16.4 (*)     Platelet Count 637 (*)     Absolute Neutrophil 15.4 (*)     All other components within normal limits   COMPREHENSIVE METABOLIC PANEL - Abnormal; Notable for the following:     Sodium 131 (*)     Carbon Dioxide 19 (*)     Glucose 116 (*)     Calcium 8.1 (*)     Albumin 2.4 (*)     Alkaline Phosphatase 220 (*)     All other components within normal limits   ROUTINE UA WITH MICROSCOPIC - Abnormal; Notable for the following:     Bacteria Urine Few (*)     Mucous Urine Present (*)     All other components within normal limits   ISTAT  GASES LACTATE SOCO POCT - Abnormal; Notable for the following:     PCO2 Venous 36 (*)     All other components within normal limits   PHOSPHORUS   PULSE OXIMETRY NURSING   CARDIAC CONTINUOUS MONITORING   ISTAT CG4 GASES LACTATE SOCO NURSING POCT   MEASURE URINE OUTPUT   PATIENT CARE ORDER   URINE CULTURE AEROBIC BACTERIAL   BLOOD CULTURE   BLOOD CULTURE   INFLUENZA A AND B AND RSV PCR       Imaging Studies:   Recent Results (from the past 24 hour(s))   XR Chest 2 Views    Narrative    Exam:  Chest X-ray 2/20/2018 11:17 AM    History: Fever;     Comparison: CT 2/1/2018    Findings: PA and lateral views of the chest. Right IJ Port-A-Cath with  tip projecting over the low SVC. The trachea is midline. The  cardiomediastinal silhouette is normal in size. The pulmonary  vasculature is distinct. No pleural effusions or pneumothorax. No  focal airspace opacities. Old right clavicular fracture deformity. The  visualized upper abdomen appears unremarkable.         Impression    Impression:   1. Right IJ Port-A-Cath the tip projecting over the low SVC. No  pneumothorax.  2. No acute cardiopulmonary abnormalities.    I have  personally reviewed the examination and initial interpretation  and I agree with the findings.    NOEL LYMAN MD   CT Abdomen Pelvis w Contrast    Narrative    EXAMINATION: CT ABDOMEN PELVIS W CONTRAST, 2/20/2018 1:20 PM    TECHNIQUE:  Helical CT images from the lung bases through the  symphysis pubis were obtained with IV contrast. Contrast dose: 82 cc  of isovue 370    COMPARISON: 2/5/2018    HISTORY: abdominal pain, fever.     FINDINGS:    Lung bases: No pleural effusions. Dependent atelectasis in both lower  lobes.    Abdomen and pelvis: In this patient with prior obstructing sigmoid  colon mass there is a new sigmoid stent in place, patent. Interval  resolution of the previously described dilated loops of bowel. New  diffuse bowel wall thickening along the ascending, transverse and  descending colon and the terminal ileum with adjacent inflammatory  changes with engorged vasculature and mesenteric fat stranding. No  pneumatosis. No portal venous gas. Right upper quadrant ileostomy.  Otherwise, small and bowel loops are unremarkable.     Again noted peritoneal soft tissue thickening throughout the abdomen,  particularly along the anterior abdominal wall not significantly  changed from the prior study. Suggestion of hernia mesh repair in the  midline of the anterior abdominal wall. Small amount of free fluid in  the abdomen. No free air. Again noted prominent mesenteric and  retroperitoneal lymph nodes, measuring up to 1.3 cm short axis,  unchanged.    No suspicious liver lesions. No biliary tree dilation. Patent hepatic  vasculature. Unremarkable gallbladder. Homogeneous pancreatic  parenchyma. Main pancreatic duct is not dilated. The spleen is not  enlarged.    Adrenal glands are unremarkable. No renal stones or hydronephrosis  bilaterally. Extrarenal pelvis bilaterally. Subcentimeter cortical  foci in both kidneys, too small to characterize, likely representing  renal cysts. Urinary bladder within normal  limits. Pelvic phleboliths.  Symmetric seminal vesicles. The prostate is not enlarged.    Bones: Degenerative changes of the spine. Degenerative changes of  bilateral SI joints. Scattered sclerotic foci throughout the bones.  Postoperative changes of left total hip arthroplasty. Stable lucent  focus in the left acetabulum, benign-appearing. No suspicious bone  lesions.      Impression    IMPRESSION: In this patient with prior obstructing sigmoid colon mass:  1. New diffuse bowel wall thickening along the ascending, transverse  and descending colon as well as the terminal ileum with adjacent  inflammatory changes. Findings are concerning for colitis, likely  infectious/inflammatory and less likely ischemic in etiology. No  pneumatosis. No portal venous gas.  2.New sigmoid stent in place. Interval resolution of the previously  described dilated loops of bowel.  3. Stable peritoneal thickening, nodularity and small amount of free  fluid in the abdomen likely related to peritoneal carcinomatosis.  4. Prominent mesenteric and retroperitoneal lymph nodes concerning for  metastases although some may be reactive, stable. Attention on  follow-up studies.    I have personally reviewed the examination and initial interpretation  and I agree with the findings.    ZEUS CROSS MD ([place])       Recent vital signs:   BP 93/76  Temp 99  F (37.2  C) (Oral)  SpO2 100%    Cardiac Rhythm: Normal Sinus  Pt needs tele? No  Skin/wound Issues: None    Code Status: Full Code    Pain control: pt had none    Nausea control: pt had none    Abnormal labs/tests/findings requiring intervention:     Family present during ED course? Yes   Family Comments/Social Situation comments:     Tasks needing completion: None    Vikash Baeza RN  [facility]-- [phone] [facility]  [phone] [facility]

## 2018-02-20 NOTE — IP AVS SNAPSHOT
MRN:[id number]                      After Visit Summary   2/20/2018    Sebas Lopez    MRN: [id number]           Thank you!     Thank you for choosing [facility] for your care. Our goal is always to provide you with excellent care. Hearing back from our patients is one way we can continue to improve our services. Please take a few minutes to complete the written survey that you may receive in the mail after you visit with us. Thank you!        Patient Information     Date Of Birth          1965        Designated Caregiver       Most Recent Value    Caregiver    Will someone help with your care after discharge? yes    Name of designated caregiver [facility]    Phone number of caregiver [facility]    Caregiver address [facility]      About your hospital stay     You were admitted on:  February 20, 2018 You last received care in the:  Unit [address] [facility]    You were discharged on:  February 25, 2018       Who to Call     For medical emergencies, please call 911.  For non-urgent questions about your medical care, please call your primary care provider or clinic, [phone]          Attending Provider     Provider Specialty    Jay Preston MD Emergency Medicine    [facility], Kaden Shepard MD Oncology       Primary Care Provider Office Phone # Fax #    Jaguar Becker [phone]      Your next 10 appointments already scheduled     Mar 01, 2018  7:15 AM CST   [facility]  [facility]   [facility] ([facility])    [address] [place] [address]  [address] [place] [id number] [phone]            Mar 01, 2018  7:50 AM CST   (Arrive by 7:35 AM)   Return Visit with Linda Alves PA-C   [facility] ([facility])    [address] [place] [address]  [address] [place] [id number]   [phone]            Mar 01, 2018  9:00 AM CST   (Arrive by 8:45 AM)   IR  PICC VASCULAR with [id number]   [facility] [place], Vascular Access ([facility] [place])    [address] [facility] [address]              1. You will need to have had a history and physical exam within 7 days of the procedure. 2. Laboratory test are to be obtained by your doctor prior to the exam (CBCP, INR and PTT) 3. Someone will need to drive you to and from the hospital. 4. If you are or may be pregnant, contact your doctor or a Radiology nurse prior to the day of the exam. 5. If you have diabetes, check with your doctor or a Radiology nurse to see if your insulin needs to be adjusted for the exam. 6. If you are taking Coumadin (to thin you blood) please contact your doctor or a Radiology nurse at least 3 days before the exam for special instructions. 7. The day before your exam you may eat your regular diet and are encouraged to drink at least 2 quarts of clear liquids. Drink no alcoholic beverages for 24 hours prior to the exam. 8. Do not eat any solid food or milk products for 6 hours prior to the exam. You may drink clear liquids until 2 hours prior to the exam. Clear liquids include the following: water, Jell-O, clear broth, apple juice or any noncarbonated drink that you can see through (no pop!) 9. The morning of the exam you may brush your teeth and take medications as directed with a sip of water. 10. Tell the Radiology nurse if you have any allergies.            Mar 01, 2018 10:30 AM CST   Infusion 240 with [facility] ONCOLOGY INFUSION, UC 21 ATC   [facility] ([facility])    [address] [place] [address]  [address]  [facility] [id number] [phone]            Mar 29, 2018  4:15 PM CDT   (Arrive by 4:00 PM)   Return Visit with Sukhdeep Mota MD   [facility] ([facility])    [address] [place] [address]  [address]  [facility] [address] [phone]              Additional Services      "Home infusion referral       Resume home TPN.  -------------------------   CVC Line       Port A Cath Single 08/10/17 Right Chest wall    PICC-to be placed prior to d/c.    ------------------------  The following homecare is recommended:  RN skilled nursing visit. RN to assess vital signs and weight, respiratory and cardiac status, pain level and activity tolerance, hydration, nutrition and bowel status and home safety.  RN to teach medication management.  RN to assist with management of TPN, IVAB and PICC.    _______________________________  [facility]  Phone  [phone]  Fax  [phone]  ______________________________                  Pending Results     Date and Time Order Name Status Description    2/24/2018 2330 Blood culture Preliminary     2/24/2018 2330 Blood culture Preliminary     2/23/2018 2330 Blood culture Preliminary     2/23/2018 2330 Blood culture Preliminary     2/22/2018 2330 Blood culture Preliminary     2/22/2018 2330 Blood culture Preliminary     2/21/2018 2330 Blood culture Preliminary     2/21/2018 2330 Blood culture Preliminary     2/21/2018 1419 Blood culture Preliminary     2/21/2018 1419 Blood culture Preliminary             Statement of Approval     Ordered          02/25/18 1215  I have reviewed and agree with all the recommendations and orders detailed in this document.  EFFECTIVE NOW     Approved and electronically signed by:  Jay Saez MD             Admission Information     Date & Time Provider Department Dept. Phone    2/20/2018 Kaden Hardin MD Unit 7D [facility] [phone]      Your Vitals Were     Blood Pressure Temperature Respirations Height Weight Pulse Oximetry    113/70 (BP Location: Left arm) 97.7  F (36.5  C) (Oral) 18 1.753 m (5' 9\") 69.1 kg (152 lb 6.4 oz) 100%    BMI (Body Mass Index)                   22.51 kg/m2           MyChart Information     MyChart gives you secure access to your electronic health record. If you " see a primary care provider, you can also send messages to your care team and make appointments. If you have questions, please call your primary care clinic.  If you do not have a primary care provider, please call [phone] and they will assist you.        Care EveryWhere ID     This is your Care EveryWhere ID. This could be used by other organizations to access your [facility] medical records  [id number]        Equal Access to Services     SARAH DUNN : Hadii karrie spiveyo Soyolandaali, waaxda luqadaha, qaybta kaalmada adebenitajosianeda, bhargav douglas jimbobrice blancozullyvijay perezginabrice willis. So [facility] [phone].    ATENCIÓN: Si maikel rios, tiene a cid disposición servicios gratuitos de asistencia lingüística. Llame al [phone].    We comply with applicable federal civil rights laws and [place] laws. We do not discriminate on the basis of race, color, national origin, age, disability, sex, sexual orientation, or gender identity.               Review of your medicines      START taking        Dose / Directions    vancomycin 1,500 mg   Indication:  Bacteria in the Blood        Dose:  1500 mg   Inject 1,500 mg into the vein every 12 hours for 14 days   Refills:  0         CONTINUE these medicines which may have CHANGED, or have new prescriptions. If we are uncertain of the size of tablets/capsules you have at home, strength may be listed as something that might have changed.        Dose / Directions    opium tincture tincture   This may have changed:    - when to take this  - reasons to take this   Used for:  Diarrhea, unspecified type, Cancer of sigmoid colon (H), Peritoneal carcinomatosis (H)        Dose:  6 mg   Take 0.6 mLs (6 mg) by mouth 4 times daily   Quantity:  72 mL   Refills:  0       oxyCODONE 10 MG 12 hr tablet   Commonly known as:  OXYCONTIN   This may have changed:  Another medication with the same name was removed. Continue taking this medication, and follow the directions you see here.   Used for:  Peritoneal  carcinomatosis (H), Cancer of sigmoid colon (H)        Dose:  20 mg   Take 2 tablets (20 mg) by mouth every 12 hours   Quantity:  120 tablet   Refills:  0       Psyllium 51.7 % Pack   Commonly known as:  METAMUCIL FIBER   This may have changed:    - when to take this  - reasons to take this   Used for:  Diarrhea, unspecified type        Dose:  1 packet   Take 1 packet by mouth 3 times daily   Quantity:  90 each   Refills:  3         CONTINUE these medicines which have NOT CHANGED        Dose / Directions    amylase-lipase-protease 51047 UNITS Cpep   Commonly known as:  CREON   Used for:  Diarrhea, unspecified type, Peritoneal carcinomatosis (H), Cancer of sigmoid colon (H)        Dose:  2 capsule   Take 2 capsules (72,000 Units) by mouth 3 times daily (with meals) And 1 capsule with each snack tid.   Quantity:  240 capsule   Refills:  3       diphenoxylate-atropine 2.5-0.025 MG per tablet   Commonly known as:  LOMOTIL   Used for:  Diarrhea due to drug        Dose:  1-2 tablet   Take 1-2 tablets by mouth 4 times daily as needed for diarrhea   Quantity:  240 tablet   Refills:  5       IRON SUPPLEMENT PO        Dose:  325 mg   Take 325 mg by mouth 2 times daily (with meals)   Refills:  0       LORazepam 0.5 MG tablet   Commonly known as:  ATIVAN   Used for:  Peritoneal carcinomatosis (H), Cancer of sigmoid colon (H)        Dose:  0.5 mg   Take 1 tablet (0.5 mg) by mouth every 4 hours as needed (Anxiety, Nausea/Vomiting or Sleep)   Quantity:  30 tablet   Refills:  5       omeprazole 20 MG CR capsule   Commonly known as:  priLOSEC   Used for:  Diarrhea, unspecified type        Dose:  20 mg   Take 1 capsule (20 mg) by mouth daily   Quantity:  30 capsule   Refills:  11       ondansetron 8 MG ODT tab   Commonly known as:  ZOFRAN-ODT   Used for:  Nausea        Dose:  8 mg   Take 1 tablet (8 mg) by mouth every 8 hours   Quantity:  30 tablet   Refills:  1       parenteral nutrition - PTA/DISCHARGE ORDER        Inject into the  vein daily See medication history note from 2/20/18 for most recent TPN formula.   Refills:  0         STOP taking     loperamide 2 MG capsule   Commonly known as:  IMODIUM                Where to get your medicines      Some of these will need a paper prescription and others can be bought over the counter. Ask your nurse if you have questions.     You don't need a prescription for these medications     vancomycin 1,500 mg                Protect others around you: Learn how to safely use, store and throw away your medicines at www.disposemymeds.org.        ANTIBIOTIC INSTRUCTION     You've Been Prescribed an Antibiotic - Now What?  Your healthcare team thinks that you or your loved one might have an infection. Some infections can be treated with antibiotics, which are powerful, life-saving drugs. Like all medications, antibiotics have side effects and should only be used when necessary. There are some important things you should know about your antibiotic treatment.      Your healthcare team may run tests before you start taking an antibiotic.    Your team may take samples (e.g., from your blood, urine or other areas) to run tests to look for bacteria. These test can be important to determine if you need an antibiotic at all and, if you do, which antibiotic will work best.      Within a few days, your healthcare team might change or even stop your antibiotic.    Your team may start you on an antibiotic while they are working to find out what is making you sick.    Your team might change your antibiotic because test results show that a different antibiotic would be better to treat your infection.    In some cases, once your team has more information, they learn that you do not need an antibiotic at all. They may find out that you don't have an infection, or that the antibiotic you're taking won't work against your infection. For example, an infection caused by a virus can't be treated with antibiotics. Staying on an  antibiotic when you don't need it is more likely to be harmful than helpful.      You may experience side effects from your antibiotic.    Like all medications, antibiotics have side effects. Some of these can be serious.    Let you healthcare team know if you have any known allergies when you are admitted to the hospital.    One significant side effect of nearly all antibiotics is the risk of severe and sometimes deadly diarrhea caused by Clostridium difficile (C. Difficile). This occurs when a person takes antibiotics because some good germs are destroyed. Antibiotic use allows C. diificile to take over, putting patients at high risk for this serious infection.    As a patient or caregiver, it is important to understand your or your loved one's antibiotic treatment. It is especially important for caregivers to speak up when patients can't speak for themselves. Here are some important questions to ask your healthcare team.    What infection is this antibiotic treating and how do you know I have that infection?    What side effects might occur from this antibiotic?    How long will I need to take this antibiotic?    Is it safe to take this antibiotic with other medications or supplements (e.g., vitamins) that I am taking?     Are there any special directions I need to know about taking this antibiotic? For example, should I take it with food?    How will I be monitored to know whether my infection is responding to the antibiotic?    What tests may help to make sure the right antibiotic is prescribed for me?      Information provided by:  www.cdc.gov/getsmart  U.S. Department of Health and Human Services  Centers for disease Control and Prevention  National Center for Emerging and Zoonotic Infectious Diseases  Division of Healthcare Quality Promotion        PARENTERAL NUTRITION FORMULA      (Show up to 1 orders; newest on the left.)     Start date and time   02/24/2018 2000      parenteral nutrition - ADULT  compounded formula CYCLE [[id number]]    Order Status  Active    Last Given  02/24/2018 2003       Macro Ingredients    TRAVASOL 10 %  90 g    dextrose  240 g       Electrolytes    sodium chloride  101.67 mEq    sodium phosphate  25 mmol    potassium chloride  53.58 mEq    potassium acetate  16.42 mEq    calcium gluconate  0.43 g    magnesium sulfate  1.85 g       Additives    INFUVITE  10 mL    trace elements (Multitrace-5 Conc)  1 mL    phytonadione  5 mg       QS Base    sterile water  586.9 mL       Calorie Contribution    Proteins  360 kcal    Dextrose  822.86 kcal    Lipids  --    Total  1,182.86 kcal       Electrolyte Ion Ordered Amount    Sodium  135 mEq    Potassium  70 mEq    Calcium  2 mEq    Magnesium  15 mEq    Phosphate  25 mmol    Acetate  95.62 mEq       Electrolyte Ion Calculated Amount    Sodium  135 mEq    Potassium  70 mEq    Calcium  2 mEq    Magnesium  15 mEq    Aluminum  --    Phosphate  25 mmol    Chloride  191.24 mEq    Acetate  95.62 mEq       Other    Total Protein  90 g    Total Protein/kg  1.38 g/kg    Glucose Infusion Rate  2.72-5.61 mg/kg/min    Osmolarity  1,325    Volume  1,920 mL    Rate   mL/hr    Dosing Weight  65 kg (Order-Specific)    Infusion Site  [facility]       Admin Instructions       Infuse using a 0.22 micron filter.  Cycle TPN over 12 hours.  Infuse at 85 mL/h for the first hour,   increase to 175 mL/h for 10 hours,   decrease to 85 mL/h for the last hour, then stop TPN.    Nurse Instructions:  To discontinue TPN, decrease rate to   of current rate for 1 hour. May continue at   rate until bag runs out or for 24h.                   Medication List: This is a list of all your medications and when to take them. Check marks below indicate your daily home schedule. Keep this list as a reference.      Medications           Morning Afternoon Evening Bedtime As Needed    amylase-lipase-protease 58647 UNITS Cpep   Commonly known as:  CREON   Take 2 capsules (72,000 Units) by  mouth 3 times daily (with meals) And 1 capsule with each snack tid.                                         diphenoxylate-atropine 2.5-0.025 MG per tablet   Commonly known as:  LOMOTIL   Take 1-2 tablets by mouth 4 times daily as needed for diarrhea                                   IRON SUPPLEMENT PO   Take 325 mg by mouth 2 times daily (with meals)   Last time this was given:  325 mg on 2/25/2018  8:21 AM                                      LORazepam 0.5 MG tablet   Commonly known as:  ATIVAN   Take 1 tablet (0.5 mg) by mouth every 4 hours as needed (Anxiety, Nausea/Vomiting or Sleep)                                   omeprazole 20 MG CR capsule   Commonly known as:  priLOSEC   Take 1 capsule (20 mg) by mouth daily   Last time this was given:  20 mg on 2/25/2018  8:21 AM   Next Dose Due:  2/26/2018 at 8:00am                                   ondansetron 8 MG ODT tab   Commonly known as:  ZOFRAN-ODT   Take 1 tablet (8 mg) by mouth every 8 hours   Last time this was given:  8 mg on 2/25/2018  9:32 AM                                         opium tincture tincture   Take 0.6 mLs (6 mg) by mouth 4 times daily                                oxyCODONE 10 MG 12 hr tablet   Commonly known as:  OXYCONTIN   Take 2 tablets (20 mg) by mouth every 12 hours   Last time this was given:  20 mg on 2/25/2018  5:37 AM   Next Dose Due:  2/25/2018 at 5:00pm                                      parenteral nutrition - PTA/DISCHARGE ORDER   Inject into the vein daily See medication history note from 2/20/18 for most recent TPN formula.                                Psyllium 51.7 % Pack   Commonly known as:  METAMUCIL FIBER   Take 1 packet by mouth 3 times daily                                vancomycin 1,500 mg   Inject 1,500 mg into the vein every 12 hours for 14 days   Last time this was given:  1,500 mg on 2/25/2018  9:31 AM                                                More Information        Bacteremia, Suspected  (Adult)  Bacteremia is a bacterial infection that has spread to the bloodstream. This is serious because it can spread to other organs, including the kidneys, brain, and lungs.  Causes  Bacteremia usually starts with a typical infection, but it then spreads to the blood. Almost any type of infection can cause bacteremia, including a urinary tract infection, skin infection, gastrointestinal problem, surgical complication, or pneumonia.  Symptoms  At first symptoms may seem like any typical infection or illness, but then they worsen. Symptoms of bacteremia can include:    Fever and chills    Loss of appetite    Nausea or vomiting    Trouble breathing or fast breathing    Fast heart rate    Feeling lightheaded or faint    Skin rashes or blotches  Home care  Follow these guidelines when caring for yourself at home.    Rest at home for the first 2 to 3 days. When resuming activity, don't let yourself become overly tired.    You can take acetaminophen or ibuprofen for pain, unless you were given a different pain medicine to use. (Note: If you have chronic liver or kidney disease or have ever had a stomach ulcer or gastrointestinal bleeding, talk with your healthcare provider before using these medicines. Also talk to your provider if you are taking medicine to prevent blood clots.)     If you were given antibiotics, take them until they are used up, or your healthcare provider tells you to stop. It is important to finish the antibiotics even though you feel better. This is to make sure the infection has cleared.  Follow-up care  Follow up with your healthcare provider, or as advised.    If a culture was done, you will be notified if your treatment needs to be changed. You can call as directed for the results.    If X-rays, a CT, or an ultrasound were done, a specialist will review them. You will be notified of any findings that may affect your care.  Call 911  Contact emergency services right away if any of these  occur:    Trouble breathing or swallowing, or wheezing    Chest pain    Confusion or sudden change in behavior    Extreme drowsiness or trouble awakening    Fainting or loss of consciousness    Rapid heart rate    Low blood pressure    Vomiting blood, or large amounts of blood in stool    Seizure  When to seek medical advice  Call your healthcare provider right away if any of these occur:    Cough with lots of colored sputum (mucus), or blood in your sputum    Severe headache    Severe face, neck, throat, or ear pain    Pain in the right lower abdomen    Weakness, dizziness, repeated vomiting, or diarrhea    Joint pain or a new rash    Burning when urinating    Fever of 100.4 F (38 C) or higher  Date Last Reviewed: 7/30/2015 2000-2017 [employer]. [address] [place] [address], [place] [address]. All rights reserved. This information is not intended as a substitute for professional medical care. Always follow your healthcare professional's instructions.

## 2018-02-21 NOTE — PROGRESS NOTES
CLINICAL NUTRITION SERVICES - ASSESSMENT NOTE     Nutrition Prescription    RECOMMENDATIONS FOR MDs/PROVIDERS TO ORDER:  None    Malnutrition Status:    Severe malnutrition in the context of acute illness    Recommendations already ordered by Registered Dietitian (RD):  Order calorie counts starting 2/21 x 3 days  Order prn Snacks/Supplements with meals     Future/Additional Recommendations:  Ability to cycle PN to 12 hr cycle vs tapering PN off pending calorie count collection and maintenance of lytes and wt status and ileostomy outputs.      REASON FOR ASSESSMENT  Sebas Lopez is a/an 52 year old male assessed by the dietitian for Admission Nutrition Risk Screen for unintentional loss of 10# or more in the past two months, reduced oral intake over the last month and tube feeding or parenteral nutrition and Pharmacy/Nutrition to Start and Manage PN    NUTRITION HISTORY  Pt known to Nutrition Services from previous hospitalization (2/5 thru 2/9) d/t need for PN support (ordered on 2/6) secondary to pt developing a bowel obstruction). Pt discharged on 2/9 with PN support.   Per H&P, reported that pt is recording his calorie intakes (on his patricia) and is consuming about 2500 calories per day. Pt states he has been consuming small frequent meals (able to tolerate eggs, chili, meat loaf, also consuming supplements, such as muscle milk) throughout the day at home during interim without difficulty.  Commented that he has to empty his ileostomy bag every 2 hrs depending on what he consumes. Reports he continue to receive PN support and remains on a 24 hrs infusion.     CURRENT NUTRITION ORDERS  Diet: Regular  Intake/Tolerance: Fair po last night. Consumed 100% of pineapple and orange juice this am without difficulty. Still working on his grapes and apple juice.   - per RN, informed that pt has abd discomfort this am, but improved with pain meds.     Nutrition Support  Parenteral  Type of Access: Central  "Line  Parenteral Frequency: Continuous  Parenteral Regimen; Dex 240 g, AA 90 g plus 250 ml lipids daily.  Total Parenteral Provisions: CPN, goal volume 1920 ml/day with 240 g Dex daily (816 kcal), 90 g AA daily (360 kcal) and 250 ml of 20% IV lipids daily = 1676  kcals/day (26 kcal/kg/day), 1.4 g PRO/kg/day, GIR 2.6 with 30% kcals from Fat per dosing wt = 65 kg    LABS  Labs reviewed    MEDICATIONS  Medications reviewed    ANTHROPOMETRICS  Height: 175.3 cm (5' 9\")  Most Recent Weight: 65.4 kg (144 lb 1.6 oz) - today's, Admit wt = 64.9 kg (143 lbs) on 2/20  IBW: 73 kg (89% of IBW)  BMI: Normal BMI  Weight History: Pt reports his admit wt is up about 10 lbs from his wt at home of 134 lbs per his own scale. Denies any fluid retention. Per previous hospitalization, pt's admit wt = 65.3 kg with discharge wt of 64.1 kg on 2/9. Per review of EMR, wt status fairly stable except for wt on 2/15 which pt unable to recall where wt was obtained from.  Wt Readings from Last 10 Encounters:   02/21/18 65.4 kg (144 lb 1.6 oz)   02/15/18 61.6 kg (135 lb 11.2 oz)   02/09/18 64.1 kg (141 lb 4.8 oz)   02/05/18 66.5 kg (146 lb 11.2 oz)   02/01/18 64.9 kg (143 lb)   01/31/18 65.3 kg (143 lb 14.4 oz)   01/29/18 64.5 kg (142 lb 3.2 oz)   01/26/18 66.8 kg (147 lb 4.3 oz)   01/25/18 67.1 kg (148 lb)   12/21/17 65.9 kg (145 lb 5 oz)       Dosing Weight: 65 kg    ASSESSED NUTRITION NEEDS  Estimated Energy Needs: 7598-3577 kcals/day (30 - 35 kcals/kg )  Justification: Underweight  Estimated Protein Needs: 85-98 grams protein/day (1.3 - 1.5 grams of pro/kg)  Justification: Hypercatabolism with acute illness  Estimated Fluid Needs: (1 mL/kcal)   Justification: Maintenance    PHYSICAL FINDINGS  See malnutrition section below.  Per H&P, CT AP concerning for new thickening of colon secondary to presumed infectious colitis.    MALNUTRITION  % Intake: No decreased intake noted with pt receiving PN   % Weight Loss: Weight loss does not meet " criteria  Subcutaneous Fat Loss: Facial region and Thoracic/intercostal: Moderate  Muscle Loss: Temporal, Facial & jaw region: Moderate, Thoracic region (clavicle, acromium bone, deltoid, trapezius, pectoral); Moderate and Upper arm (bicep, tricep): Mild  Fluid Accumulation/Edema: None noted  Malnutrition Diagnosis: Severe malnutrition in the context of acute illness    NUTRITION DIAGNOSIS  Predicted inadequate nutrient intake (protein-calorie) related to h/o bowel obstruction inhibiting po last admission, but po diet adv over past several weeks with good tolerance, but uncertain of adequacy and pt remains dependent on PN, but current regimen falling short of meeting pt's estimated nutritional needs.      INTERVENTIONS  Implementation  Nutrition Education: Provided education on plan to continue home PN therapy, but discussed trial of cycling PN overnight to allow pt teodoro off. Pt receptive to plan.   Collaboration with Provider and Pharm D regarding recommendation to cycle PN starting tonight (2/21)  Medical food supplement therapy - provided education on trialing nutritional supplements (since pt tolerating po, mostly liquids) to determine tolerance and possible optimize po intakes in order to possible wean off PN.    Goals  Total avg nutritional intakes (from po pending calorie ct collection and PN to meet a minimum of 30 kcal/kg and  1.3  g PRO/kg daily (per dosing wt 65 kg).     Monitoring/Evaluation  Progress toward goals will be monitored and evaluated per protocol.  Tash Yip RD,LD  Pager [phone]

## 2018-02-21 NOTE — PROGRESS NOTES
Care Coordinator- Discharge Planning     Admission Date/Time:  2/20/2018  Attending MD:  Kaden Hardin, *     Data  Date of initial CC assessment:  2/20  Chart reviewed, discussed with interdisciplinary team.   Patient was admitted for:   1. Fever    2. Malignant neoplasm of sigmoid colon (H)    3. 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  Current Living Situation: Patient lives with spouse.  Support System: Supportive and Involved  Services Involved: Home Infusion  Transportation: Family or Friend will provide  Barriers to Discharge: none      Coordination of Care and Referrals: Provided patient/family with options for Home Infusion.    Currently receives home TPN via portacath per HI.  Resumption instructions entered into AVS.      Plan  Anticipated Discharge Date:  1-2 days  Anticipated Discharge Plan:  home    CTS Handoff completed:  At d/c.    Karina Menon RN   ________________________________________  Karina Menon RN, BSN    7D/Oncology Care Coordinator  Pager  [phone]  Phone [phone]

## 2018-02-21 NOTE — PROGRESS NOTES
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 He was recently admitted 2/5-2/9 with abdominal pain/N/V 2/2 colonic obstruction; during that admission, a colonic stent was placed.    HPI: The patient presented originally to [facility] in [place] ([facility]) on 2/19 with a 2-day history of fevesr, and per the ED note in Care Everywhere, no localizing signs/sx of infection. He returned home despite recommendation of ED physician there to transfer to [facility]. Yesterday he was febrile again to 102.4 at home, and presented to [facility]. He now endorses myalgias, nausea, and headache. The patient was febrile to 103, with a WBC of 17.5 and lactate of 2.1. A chest Xray from 2/20 shows no acute cardiopulmonary abnormalities, however a CT abdomen/pelvis showed \"new diffuse bowel wall thickening along the ascending, transverse and descending colon as well as the terminal ileum with adjacent inflammatory changes. Findings are concerning for colitis, likely infectious/inflammatory and less likely ischemic in etiology. No pneumatosis. No portal venous gas.\" Peripheral blood cultures obtained 2/20 are growing 2/2 GPC in clusters, identified by the Double Doodsigene as Staph epidermidis, negative for MecA gene. A chart note indicates that blood cultures from [facility] (also peripheral draws) 2/19 are also growing GPCs. C difficile and enteric panel are negative (patient seems to have ongoing diarrhea that is not new). Repeat blood cultures are pending. The patient has a port-a-cath that has been in place since 8/10/2017. The patient has been afebrile, WBC has decreased to 9.9 " today, and lactate has normalized.    Antimicrobials during admission:  Vancomycin 2/20-  Zosyn 2/20-    Assessment:   Methicillin sensitive S. Epidermidis bacteremia: suspicion that this is from the indwelling port, as the organism does not fit with a colitis picture. The blood cultures that have been obtained thus far are all peripheral draws, except for one repeat draw on 2/20, which is unclear whether it was drawn from the port or peripherally. Obtaining daily repeat blood cultures from both the port and peripheral sticks would help confirm the central line as a source, in addition to monitoring for clearing of bacteremia. An echo could be useful to assist in ruling out endocarditis if the blood cultures are persistently positive despite IV antibiotic therapy (~72hr). If the bacteremia is indeed associated with the port, and it is desired to attempt to salvage the port, antimicrobial or ethanol lock therapy for 7-14 days would be needed in addition to IV antibiotics. Ethanol would have an advantage over antimicrobial lock therapy in biofilm breakdown; this would be beneficial for an infection with S. Epidermidis. The current antibiotic regimen of vancomycin and Zosyn may be streamlined to a regimen that provides more focused MSSE coverage, while still covering suspected colitis. One such regimen would be ceftriaxone plus metronidazole. Taking into account the medical complexity of the patient and the management decisions required for his bacteremia, infectious diseases consultation could also be considered.   Suspected colitis/typhlitis: based upon imaging findings, the current antimicrobial regimen is targeted not only toward the MSSE bacteremia but also colitis/typhlitis. Zosyn provides good coverage for intra-abdominal organisms, but ceftriaxone and metronidazole would also provide adequate abdominal coverage while providing more targeted therapy for the bacteremia.     Recommendations:  1. Consider  discontinuing Zosyn and vancomycin; start ceftriaxone 2g q24h and metronidazole 500mg IV q6h for coverage of intra-abdominal infection and MSSE bacteremia.  2. Obtain daily repeat blood cultures from port and peripheral sites until clearance documented x 72 hours. A TTE is indicated if cultures remain positive after 72 hours of antibiotic therapy.   3. If bacteremia is line-associated and salvage of the port is desired, consider ethanol lock therapy for duration of IV antibiotic therapy.  4. Consider infectious diseases consult if there are questions regarding further management of multiple infectious issues.     Discussed with ID Staff - Dr. Naman Chapa MD and Ashley Meek, Melissa Dawkins Pharm.D.   PGY2 Infectious Diseases Pharmacy Resident  Pager: [phone]  Phone: [phone]    Current Anti-infective Orders:  Anti-infectives (Future)    Start     Dose/Rate Route Frequency Ordered Stop    02/20/18 2200  vancomycin (VANCOCIN) 1,250 mg in sodium chloride 0.9 % 250 mL intermittent infusion      1,250 mg  over 90 Minutes Intravenous EVERY 12 HOURS 02/20/18 1141      02/20/18 1900  piperacillin-tazobactam (ZOSYN) 3.375g in 15 mL NS Premix Syringe      3.375 g  over 3 Minutes Intravenous EVERY 6 HOURS 02/20/18 1647            Vitals and other clinical features  Vitals       02/19 0700  -  02/20 0659 02/20 0700  -  02/21 0659 02/21 0700  -  02/21 0940   Most Recent    Temp ( F)   95.8 -  103    97.5 -  98     98 (36.7)    Heart Rate   83 -  118      89     89    Resp   16 -  17      20     20    BP   (!)80/59 -  107/59      95/60     95/60    SpO2 (%)   96 -  100      99     99        Temperature curve:        Labs  Estimated Creatinine Clearance: 98.9 mL/min (based on Cr of 0.79).  Recent Labs   Lab Test  02/21/18   0632  02/20/18   1029  02/13/18   1025  02/09/18   0614  02/08/18   0555  02/07/18   0546   CR  0.79  0.84  0.86  0.60*  0.62*  0.74       Recent Labs   Lab Test  02/21/18   0632  02/20/18    1029  02/13/18   1025  02/09/18   0614  02/08/18   0555  02/07/18   0830  02/06/18   0544   WBC  9.9  17.5*  12.4  11.9*  13.9*  22.8*  33.2*   ANEU   --   15.4*  8.9  9.6*  11.1*  21.2*  26.8*   ALYM   --   1.4  2.2  1.0  1.2  1.0  1.6   ADEN   --   0.7  1.0  0.7  0.6  0.4  3.3*   AEOS   --   0.0  0.2  0.1  0.5  0.0  0.2   HGB  7.3*  9.0*  9.9*  8.2*  8.3*  8.5*  9.4*   HCT  22.9*  28.0*  29.4  24.8*  25.0*  24.4*  27.3*   MCV  88  88  85  84  85  81  83   PLT  439  637*  829*  390  320  331  341       Recent Labs   Lab Test  02/20/18   1029  02/13/18   1025  02/06/18   0544  02/05/18   0916  01/31/18   0913  01/26/18   0844   BILITOTAL  0.4  0.4  0.9  0.6  0.6  0.6   ALKPHOS  220*  349  229*  213*  134  167*   ALBUMIN  2.4*  3.4  1.8*  2.5*  3.4  3.1*   AST  32  31  16  18  15  12   ALT  49  39  10  12  14  17       Recent Labs   Lab Test  02/21/18   0632  02/20/18   1332   LACT  0.9  0.9     Culture results with specimen source  Culture Micro   Date Value Ref Range Status   02/20/2018 PENDING  Preliminary   02/20/2018 PENDING  Preliminary   02/20/2018 PENDING  Preliminary   02/20/2018 (A)  Preliminary    Cultured on the 1st day of incubation:  Gram positive cocci in clusters     02/20/2018   Preliminary    Critical Value/Significant Value, preliminary result only, called to and read back by  Kelsey Jaramillo RN U7D @ 0725 2.21.18 TAVIA     02/20/2018 (A)  Preliminary    Cultured on the 1st day of incubation:  Gram positive cocci in clusters     02/20/2018   Preliminary    Critical Value/Significant Value, preliminary result only, called to and read back by   MEHUL WREN RN (7D).  02.21.18 0700 GJS      Specimen Description   Date Value Ref Range Status   02/21/2018 Feces  Final   02/20/2018 Blood Left Arm  Final   02/20/2018 Blood Right Hand  Final   02/20/2018 Midstream Urine  Final   02/20/2018 Blood Right Arm  Final   02/20/2018 Nasopharyngeal  Final        Urine Studies     Recent Labs   Lab Test  02/20/18    1141  02/05/18   1310   URINEPH  5.0  5.5   NITRITE  Negative  Negative   LEUKEST  Negative  Negative   WBCU  0  2     Last check of C difficile  C Diff Toxin B PCR   Date Value Ref Range Status   02/21/2018 Negative NEG^Negative Final     Comment:     Negative: Clostridium difficile target DNA sequences NOT detected, presumed   negative for Clostridium difficile toxin B or the number of bacteria present   may be below the limit of detection for the test.  FDA approved assay performed using B-152 GeneXpert real-time PCR.  A negative result does not exclude actual disease due to Clostridium difficile   and may be due to improper collection, handling and storage of the specimen   or the number of organisms in the specimen is below the detection limit of the   assay.         Imaging:  Xr Chest 2 Views    Result Date: 2/20/2018  Exam:  Chest X-ray 2/20/2018 11:17 AM History: Fever; Comparison: CT 2/1/2018 Findings: PA and lateral views of the chest. Right IJ Port-A-Cath with tip projecting over the low SVC. The trachea is midline. The cardiomediastinal silhouette is normal in size. The pulmonary vasculature is distinct. No pleural effusions or pneumothorax. No focal airspace opacities. Old right clavicular fracture deformity. The visualized upper abdomen appears unremarkable.      Impression: 1. Right IJ Port-A-Cath the tip projecting over the low SVC. No pneumothorax. 2. No acute cardiopulmonary abnormalities. I have personally reviewed the examination and initial interpretation and I agree with the findings. NOEL LYMAN MD    Ct Abdomen Pelvis W Contrast    Result Date: 2/20/2018  EXAMINATION: CT ABDOMEN PELVIS W CONTRAST, 2/20/2018 1:20 PM TECHNIQUE:  Helical CT images from the lung bases through the symphysis pubis were obtained with IV contrast. Contrast dose: 82 cc of isovue 370 COMPARISON: 2/5/2018 HISTORY: abdominal pain, fever. FINDINGS: Lung bases: No pleural effusions. Dependent atelectasis in both lower  lobes. Abdomen and pelvis: In this patient with prior obstructing sigmoid colon mass there is a new sigmoid stent in place, patent. Interval resolution of the previously described dilated loops of bowel. New diffuse bowel wall thickening along the ascending, transverse and descending colon and the terminal ileum with adjacent inflammatory changes with engorged vasculature and mesenteric fat stranding. No pneumatosis. No portal venous gas. Right upper quadrant ileostomy. Otherwise, small and bowel loops are unremarkable. Again noted peritoneal soft tissue thickening throughout the abdomen, particularly along the anterior abdominal wall not significantly changed from the prior study. Suggestion of hernia mesh repair in the midline of the anterior abdominal wall. Small amount of free fluid in the abdomen. No free air. Again noted prominent mesenteric and retroperitoneal lymph nodes, measuring up to 1.3 cm short axis, unchanged. No suspicious liver lesions. No biliary tree dilation. Patent hepatic vasculature. Unremarkable gallbladder. Homogeneous pancreatic parenchyma. Main pancreatic duct is not dilated. The spleen is not enlarged. Adrenal glands are unremarkable. No renal stones or hydronephrosis bilaterally. Extrarenal pelvis bilaterally. Subcentimeter cortical foci in both kidneys, too small to characterize, likely representing renal cysts. Urinary bladder within normal limits. Pelvic phleboliths. Symmetric seminal vesicles. The prostate is not enlarged. Bones: Degenerative changes of the spine. Degenerative changes of bilateral SI joints. Scattered sclerotic foci throughout the bones. Postoperative changes of left total hip arthroplasty. Stable lucent focus in the left acetabulum, benign-appearing. No suspicious bone lesions.     IMPRESSION: In this patient with prior obstructing sigmoid colon mass: 1. New diffuse bowel wall thickening along the ascending, transverse and descending colon as well as the terminal  ileum with adjacent inflammatory changes. Findings are concerning for colitis, likely infectious/inflammatory and less likely ischemic in etiology. No pneumatosis. No portal venous gas. 2.New sigmoid stent in place. Interval resolution of the previously described dilated loops of bowel. 3. Stable peritoneal thickening, nodularity and small amount of free fluid in the abdomen likely related to peritoneal carcinomatosis. 4. Prominent mesenteric and retroperitoneal lymph nodes concerning for metastases although some may be reactive, stable. Attention on follow-up studies. I have personally reviewed the examination and initial interpretation and I agree with the findings. ZEUS CROSS MD ([place])

## 2018-02-21 NOTE — PLAN OF CARE
Problem: Patient Care Overview  Goal: Plan of Care/Patient Progress Review  Outcome: No Change  00:00-07:00 am  AF Soft BP on pm 80s/40s   IV Bolus 500 cc x 2 completed at night with good response with BP 96/56 at baseline  OVSS  Denied dizziness  On IV Abx  Oxycodone 10 mg x 1 for abdominal pain with adequate relief  Ileostomy intact with large liquid stool and sample sent, negative for C-diff/tox but bacteria and virus panel will pending  Remains in Enteric Iso.  TPN/IL infusing    Up ad juan  Voiding spontaneously and not saving urine  No new acute issues overnight and slept well  Continue w/POC    07:00 Received notification from Lab for positive blood cx from right arm done on 2/20, Gram positive cocci in cluster  Currently on Vanco and Zosyn  Will notify team this am

## 2018-02-21 NOTE — PLAN OF CARE
Problem: Patient Care Overview  Goal: Plan of Care/Patient Progress Review  Abdominal pain continues; oxycodone 10mg given once with relief.  Diarrhea and liquid returns from ileostomy, baseline.   Eating fair amounts; calorie counts initiated. Also receiving TPN/IL. , 129.  BC reported positive for gram + cocci in clusters, +Staph epi; MD notified.  AF, BP /60.  Continues on Zoxyn and Vancomycin.  Anticipate discharge in the next 1-2 days.

## 2018-02-21 NOTE — PLAN OF CARE
Problem: Patient Care Overview  Goal: Plan of Care/Patient Progress Review  Outcome: Improving  D: Arrived at about 1700 from ED. Patient accompanied by family. Admitted for fever of 102 at home.  I: Admission process completed. Patient oriented to room, environment, call light. MD notified of patients arrival on unit.  A: Soft BP's, MD paged for decrease into 80/40's; 1L NS bolus infusing. Tmax of 103; Tylenol given and fever trending down. Lactic acid of 0.9. Denies pain, nausea, SOB, numbness/tingling. TPN and Lipids infusing. Tolerating regular diet. Ileostomy managed by pt with good output. Voiding spontaneously/adequately. IV Vanco for positive BC's. Needs Stool sample. Palliative chemo anticipated to start tomorrow. Continue monitoring and with POC.  P: Implement plan of care when available. Continue to monitor patient. Nursing interventions as appropriate. Notify MD with changes in pt status.

## 2018-02-22 NOTE — PROGRESS NOTES
Calorie Count  Intake recorded for: 2/21 Kcals: 1778  Protein: 68g  # Meals Recorded: 3 meals (First - 100% orange juice, lemonade, apple juice, grapes, pineapple)      (Second - 100% blubbery muffin w/ butter, popsicle, 50% quesadilla with sour cream)      (Third - 100% france, lettuce & tomato sandwich, pudding, blueberry muffin w/ butter)  # Supplements Recorded: 100% 2 Boost Glucose Control

## 2018-02-22 NOTE — PLAN OF CARE
2/22/18 I saw the patient(pt)in his room for PLC PICC/IV med education.Pt.had PLC IVAD/TPN education a few weeks ago and was doing IVAD/TPN skills at home.PICC line is not placed yet.Nurses will need to reinforce the type of PICC that will be placed.I briefly reviewed valved vs non-valved PICCs.Pt.correctly returned saline/heparin flush and IV push medication using [facility] supplies on PLC model,asked a few questions,able to answer teach back,and verbalized understanding of content presented.Continue to reinforce information.Home care to follow.Also see education flow sheet.  Written material given and reviewed today:Handwashing,Heparin/Saline Flush,Caring For Your PICC,Getting Ready For Your PICC,End Cap Change,IV Push Medication,PLC card.

## 2018-02-22 NOTE — PROGRESS NOTES
Care Coordinator- Discharge Planning     Admission Date/Time:  2/20/2018  Attending MD:  Kaden Hardin, *     Data  Date of initial CC assessment:  2/20  Chart reviewed, discussed with interdisciplinary team.   Patient was admitted for:   1. Fever    2. Malignant neoplasm of sigmoid colon (H)    3. 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  Current Living Situation: Patient lives with spouse.  Support System: Supportive and Involved  Services Involved: Home Infusion  Transportation: Family or Friend will provide  Barriers to Discharge: none      Coordination of Care and Referrals: Provided patient/family with options for Home Infusion.    2/20/2018   Currently receives home TPN via portacath per [facility].  Resumption instructions entered into AVS.  ------------------------------------------------------------------------------------    02/22/18   Planning to place PICC this admission and d/c with daily IV Rocephin.  Epic referral entered for PLC.  Telephone referral given to [facility] and [facility] liaison, updated.  Possible d/c tmrw, 2/23.      Plan  Anticipated Discharge Date:  1-2 days  Anticipated Discharge Plan:  home    CTS Handoff completed:  At d/c.    Karina Menon RN   ________________________________________  Karina Menon RN, BSN    7D/Oncology Care Coordinator  Pager  [phone]  Phone [phone]

## 2018-02-22 NOTE — PLAN OF CARE
Problem: Patient Care Overview  Goal: Plan of Care/Patient Progress Review  Outcome: Improving  00:00-07:00  AF Soft BP at stable at 96/56 per baseline OVSS   Abdominal pain/cramping well manageable with current pain regimen  Prn Oxycodone 10 mg given x 1  Denied nausea/vomiting  TPN/IL infusing    Continue anel counts  Ileotomy patent with good output and less watery  Voiding well and not saving urine  Pt reports feeling better overall   On IV Zosyn but anticipate transition to po today if afebrile  No acute issues overnight  Continue w/POC

## 2018-02-22 NOTE — PROGRESS NOTES
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 CT AP 2/21 concerning for new thickening of colon secondary due to presumed infectious colitis. Blood cultures 2/20 positive for MSSE.     #MSSE bacteremia  Blood cultures from OSH 2/19 positive (2 out of 2) as well as 2/20. Growing MSSE. Patient does have a Port-A-Cath, which could be source of bacteremia.   - Received IV Vancomycin (x2/20-2/21). With negative MecA gene and afebrile overnight, will transition to IV Ceftriaxone 2 g QD (per Antimicrobial Stewardship recs). Per lab, sensitivities will be back 2/22 evening.   - Daily blood cultures (PORT and PERIPHERAL) until negative for 72 hrs (2/21 NGTD, 2/22 pending).   - Will plan to treat for 14 days from negative cultures.  - TTE if cultures remain positive after 72 hrs of antibiotics  - If port-a-cath culture remains positive, may consider ethanol lock therapy for duration of IV antibiotic therapy    #Presumed infectious colitis.  #H/o ulcerative colitis.  Patient reports recent fever at home, which prompted ED arrival. WBC of 17.5 on admission. CT AP 2/20/18 concerning for thickening of the ascending, transverse, and descending colon, felt most likely to represent infectious or inflammatory etiology. Of note, patient does have a history of ulcerative colitis about 20 years ago for which he was treated with steroids and mesalamine per his report.   - Received IV Zosyn (2/20-2/22) and transitioned to IV Flagyl 500 mg QID with above IV Ceftriaxone.   - Enteric bacteria panel and C diff PCR negative  - Consider GI consult if proves to be non-infectious    #Stage IV Sigmoid Adenocarcinoma with peritoneal  carcinomatosis.  S/p ex lap 7/2017 with small bowel resection and end ileostomy. S/p 6 cycles of FOLFOX. Admitted on 12/6/2017 for ex-lap with aborted HIPEC due to diffuse carcinomatosis. Repeat CT scan showed worsening peritoneal carcinomatosis and he is getting more symptomatic in terms of worsening abdominal pain and difficulty eating because of worsening abdominal cramping. He was admitted to the hospital with worsening colon distention and colon stent was placed which improved his symptoms. Follows with Dr. Long outpatient. Planning on restarting FOLFOX this week (2/21).  - Will hold chemo while inpatient  - Continue TPN    #Cancer related abdominal pain.  #Pain Assessment.  Abdominal pain has been well controlled since his previous admission 2/5-2/9 when he had a colonic stent placed. Pain is currently at baseline.  Current Pain Score 2/22/2018 2/22/2018 2/22/2018   Patient currently in pain? sleeping: patient not able to self report yes -   Pain score (0-10) - - -   Pain location - Abdomen Abdomen   Pain descriptors - - -   - Sebas is currently at his base level of chronic cancer pain. No additions to home regimen needed.  - Continue home regimen of OxyContin 20mg BID with ocyxodone 5-10mg prn     #Anemia of chronic disease. Pt with Hgb of 9.0 on admission. Currently on iron supplement. No evidence of bleeding.   - Monitor, no transfusions expected while admitted.     #Thrombocytosis. Likely reactive from acute infection.  - Daily CBC    FEN:  - LR 50ml/hr  - Lyte replacement per protocol  - Regular diet as tolerated, continue home TPN    Prophy/Misc:  - GI: continue PTA omeprazole  - DVT: Lovenox ppx    Disposition: Discharge likely 2/23 pending antibiotic sensitivities and afebrile.     Above plan discussed with staff physician, Dr. Lashawn Ferrara PATatoC  Hematology/Oncology  Pager: [phone]    Interval History  Continues to feel well. Afebrile. Abdominal pain is well controlled with home  pain regimen. Discussed plan to discharge tomorrow pending afebrile with plan for IV antibiotics at home. Will get PICC today. Denies chest pain, SOB, N/V, LE edema.     Physical Exam   Vital Signs with Ranges  Temp:  [96.9  F (36.1  C)-98.1  F (36.7  C)] 97.1  F (36.2  C)  Heart Rate:  [79-99] 99  Resp:  [18-20] 20  BP: ()/(56-65) 108/57  SpO2:  [97 %-100 %] 98 %  148 lbs 9.6 oz    Constitutional: Pleasant male seen laying in bed. No apparent distress, and appears stated age.  Eyes: Lids and lashes normal, sclera clear, conjunctiva normal.  ENT: Normocephalic,  oral pharynx with moist mucus membranes, tonsils without erythema or exudates, gums normal and good dentition.   Respiratory: No increased work of breathing, good air exchange, clear to auscultation bilaterally, no crackles or wheezing.  Cardiovascular: Regular rate and rhythm, normal S1 and S2, and no murmur noted.  GI: Ileostomy with brown/green output, midline surgical scar. +BS. Soft. No tenderness on palpation.  Skin: No bruising or bleeding, no redness, warmth, or swelling, no rashes, no lesions, no jaundice.  Extremities: There is no redness, warmth, or swelling of the joints. No lower extremity edema. No cyanosis.  Neurologic: Awake, alert, oriented to name, place and time.    Vascular access: Port, c/d/i    Recent Labs  ROUTINE IP LABS (Last four results)  BMP    Recent Labs  Lab 02/22/18  0740 02/21/18  0632 02/20/18  1029    138 131*   POTASSIUM 3.8 3.8 3.9   CHLORIDE 108 107 100   ANNAMARIE 7.6* 7.7* 8.1*   CO2 23 22 19*   BUN 12 15 18   CR 0.90 0.79 0.84   * 120* 116*     CBC    Recent Labs  Lab 02/22/18  0740 02/21/18  0632 02/20/18  1029   WBC 7.3 9.9 17.5*   RBC 2.70* 2.61* 3.20*   HGB 7.5* 7.3* 9.0*   HCT 23.6* 22.9* 28.0*   MCV 87 88 88   MCH 27.8 28.0 28.1   MCHC 31.8 31.9 32.1   RDW 16.7* 16.7* 16.4*    439 637*     INR    Recent Labs  Lab 02/21/18  0632   INR 1.27*

## 2018-02-22 NOTE — PLAN OF CARE
Problem: Patient Care Overview  Goal: Plan of Care/Patient Progress Review  Outcome: Improving  Trending soft BP's, improved since yesterday post boluses. AVSS. Afebrile. Abdominal cramping relieved with 10mg PO oxycodone (x1). Denies nausea, SOB, numbness/tingling. TPN and Lipids infusing. BG's monitored Q6H, checked at 1900, next check at 0100. Calorie Counts; see sheet on white board. Tolerating regular diet with fair appetite. Ileostomy managed by pt with good output. Voiding spontaneously/adequately. IV Vanco for positive BC's. IV Zosyn anticipated to transition to PO tomorrow. Continue monitoring and with POC.

## 2018-02-22 NOTE — PROGRESS NOTES
This is a recent snapshot of the patient's [facility] medical record.  For current drug dose and complete information and questions, call [phone] or In Basket pool, fv home infusion ([id number])  CSN Number:  [id number]

## 2018-02-22 NOTE — PLAN OF CARE
Problem: Patient Care Overview  Goal: Plan of Care/Patient Progress Review  Feeling much improved from admission.  Eating more balanced diet today, good amounts of balanced diet.  Continues on tpn and tolerating cycling.  Zosyn & vancomycin discontinued; flagyl and ceftriaxone started.  Sherry from [facility] is coming to patient's room to teach PICC cares and antibiotic self-administration at 1500.  Needs port needle changed when TPN infusion complete.  Up ad juan, ambulated in hallways. Discharge planned for tomorrow noon or shortly after.

## 2018-02-23 NOTE — PHARMACY-VANCOMYCIN DOSING SERVICE
Pharmacy Vancomycin Initial Note  Date of Service 2018  Patient's  1965  52 year old, male    Indication: Bacteremia    Current estimated CrCl = Estimated Creatinine Clearance: 89.5 mL/min (based on Cr of 0.9).    Creatinine for last 3 days  2018: 10:29 AM Creatinine 0.84 mg/dL  2018:  6:32 AM Creatinine 0.79 mg/dL  2018:  7:40 AM Creatinine 0.90 mg/dL    Recent Vancomycin Level(s) for last 3 days  No results found for requested labs within last 72 hours.      Vancomycin IV Administrations (past 72 hours)                   vancomycin (VANCOCIN) 1,250 mg in sodium chloride 0.9 % 250 mL intermittent infusion (mg) 1,250 mg New Bag 18 2216     1,250 mg New Bag  1030     1,250 mg New Bag 18 2300                Nephrotoxins and other renal medications (Future)    Start     Dose/Rate Route Frequency Ordered Stop    18 0530  vancomycin (VANCOCIN) 1,500 mg in sodium chloride 0.9 % 250 mL intermittent infusion      1,500 mg  over 90 Minutes Intravenous EVERY 12 HOURS 18 0512            Contrast Orders - past 72 hours (72h ago through future)    Start     Dose/Rate Route Frequency Ordered Stop    18 1254  iopamidol (ISOVUE-370) solution 82 mL      82 mL Intravenous ONCE 18 1253 18 1315                Plan:  1.  Start vancomycin  1500 mg IV q12h.   2.  Goal Trough Level: 15-20 mg/L   3.  Pharmacy will check trough levels as appropriate in 1-3 Days.    4. Serum creatinine levels will be ordered daily for the first week of therapy and at least twice weekly for subsequent weeks.    5. [place] method utilized to dose vancomycin therapy: Method 1     Ganesh Schulte

## 2018-02-23 NOTE — PLAN OF CARE
Problem: Patient Care Overview  Goal: Plan of Care/Patient Progress Review  Outcome: Improving  Trending soft BP's. AVSS. Afebrile. Abdominal cramping comfortably manageable with scheduled oxycontin. Denies nausea, SOB, numbness/tingling. TPN and Lipids infusing. BG's monitored Q6H for 72hrs, checked at 2100. Calorie Counts; see sheet on white board. Tolerating regular diet with fair appetite and improved oral intake tonight. Ileostomy managed by pt with continued good output. Voiding spontaneously/adequately. IV Flagyl. Previous PIV had been bumped by pt and bled; stopped bleeding; new PIV placed per vascular. PICC teaching by [facility] completed today; PICC to be placed tomorrow in anticipation of discharge. Continue monitoring and with POC.

## 2018-02-23 NOTE — PROGRESS NOTES
Patient Name: Sebas Lopez  Medical Record Number: [id number]  Today's Date: 2/23/2018    Procedure: port removal  Proceduralist: Rossi DE LUNA    Sedation start time: 1330  Sedation end time: 1425  Sedation medications administered: fentanyl 100 mcg, versed 2 mg, lido 10 cc  Total sedation time: 55 minutes    Procedure start time: 1330  Puncture time: 1335  Procedure end time: 1425    Report given to: Tarah DUGGAN      Other Notes: Pt arrived to IR room 2  from  7D Pt denies any questions or concerns regarding procedure. Pt positioned semi fowlers and monitored per protocol. Pt tolerated procedure without any noted complications. Pt transferred back to 7D.

## 2018-02-23 NOTE — CONSULTS
Patient is on IR schedule 2/23/2018 for a right sided chest port removal.   Labs WNL for procedure.  Orders for NPO and scrubs have been entered.   Consent will be done prior to procedure.     Please contact the IR [place] RN at [phone] for estimated time of procedure.     Case discussed with Dr. Milan from IR and Shari Ferrara PA-C.    Jayshree Cain, NOAH, APRN  Interventional Radiology  Phone: [phone]  Pager: [phone]

## 2018-02-23 NOTE — PROGRESS NOTES
Calorie Counts  Intake recorded for: 2/22  Kcals: 2329  Protein: 69g  # Meals Recorded: 3 meals   # Supplements Recorded: 100% 1 Boost Glucose Control

## 2018-02-23 NOTE — PROGRESS NOTES
Patient growing Gram positives from blood cultures drawn 2/22.      Review of cultures  2/20 positive x3, Staphylococcus epidermidis, started on vancomycin  2/21 negative cultures  2/22 switched from vancomycin to ceftriaxone - portacath culture w/ gram positive cocci in clusters    Based on the above I think it is reasonable to re-start vancomycin and day team can consider echocardiogram and ID consult vs removing catheter.    Ross Veronica MD  Heme/Onc/BMT sydni

## 2018-02-23 NOTE — PROCEDURES
Interventional Radiology Brief Post Procedure Note    Procedure: IR PORT REMOVAL RIGHT    Proceduralist: Meliton Richey [facility], PA-C    Assistant: Shraddha Urbina, MS-3     Time Out: Prior to the start of the procedure and with procedural staff participation, I verbally confirmed the patient s identity using two indicators, relevant allergies, that the procedure was appropriate and matched the consent or emergent situation, and that the correct equipment/implants were available. Immediately prior to starting the procedure I conducted the Time Out with the procedural staff and re-confirmed the patient s name, procedure, and site/side. (The [facility] universal protocol was followed.)  Yes    Medications   Medication Event Details Admin User Admin Time   fentaNYL (PF) (SUBLIMAZE) injection 25-50 mcg Medication Given Dose: 100 mcg; Route: Intravenous Britney Hernandez RN 2/23/2018  1:56 PM   midazolam (VERSED) injection 0.5-1 mg Medication Given Dose: 2 mg; Route: Intravenous Britney Hernandez RN 2/23/2018  1:56 PM   lidocaine 1 % 1-30 mL Medication Given Dose: 10 mL; Route: Intradermal Britney Hernandez RN 2/23/2018  1:56 PM       Sedation: IR Nurse Monitored Care   Post Procedure Summary:  Prior to the start of the procedure and with procedural staff participation, I verbally confirmed the patient s identity using two indicators, relevant allergies, that the procedure was appropriate and matched the consent or emergent situation, and that the correct equipment/implants were available. Immediately prior to starting the procedure I conducted the Time Out with the procedural staff and re-confirmed the patient s name, procedure, and site/side. (The Joint Commission universal protocol was followed.)  Yes       Sedatives: Fentanyl and Midazolam (Versed)    Vital signs, airway and pulse oximetry were monitored and remained stable throughout the procedure and sedation was maintained until the procedure was complete.  The  patient was monitored by staff until sedation discharge criteria were met.    Patient tolerance: Patient tolerated the procedure well with no immediate complications.    Time of sedation in minutes: 55 minutes from beginning to end of physician one to one monitoring.          Findings: Removal of existing 8 Fr. Single chamber central venous chest port. Port catheter sent for culture.    Estimated Blood Loss: Less than 10 ml    Fluoroscopy Time:None.    SPECIMENS: Catheter tip sent for culture.    Complications: 1. None     Condition: Stable    Plan: Follow up per primary team.    Comments: See dictated procedure note for full details.    Meliton Richey PA-C

## 2018-02-23 NOTE — PROGRESS NOTES
Care Coordinator- Discharge Planning     Admission Date/Time:  2/20/2018  Attending MD:  Kaden Hardin, *     Data  Date of initial CC assessment:  2/20  Chart reviewed, discussed with interdisciplinary team.   Patient was admitted for:   1. Fever    2. Malignant neoplasm of sigmoid colon (H)    3. 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  Current Living Situation: Patient lives with spouse.  Support System: Supportive and Involved  Services Involved: Home Infusion  Transportation: Family or Friend will provide  Barriers to Discharge: none      Coordination of Care and Referrals: Provided patient/family with options for Home Infusion.    2/20/2018   Currently receives home TPN via portacath per [facility].  Resumption instructions entered into AVS.  ------------------------------------------------------------------------------------    02/22/18   Planning to place PICC this admission and d/c with daily IV Rocephin.  Epic referral entered for PLC.  Telephone referral given to [facility] and [facility] liaison, updated.  Possible d/c tmrw, 2/23.  ------------------------------------------------------------------------------------    02/23/18   Portacath removed d/t pos cultures.  PICC placed.  Likely will not d/c with Rocephin, possibly IV Vanco.  Today's d/c cancelled d/t bacteremia.        Plan  Anticipated Discharge Date:  1-2 days  Anticipated Discharge Plan:  home    CTS Handoff completed:  At d/c.    Karina Menon RN   ________________________________________  Karina Menon, RN, BSN    7D/Oncology Care Coordinator  Pager  [phone]  Phone [phone]

## 2018-02-23 NOTE — PROCEDURES
Interventional Radiology Pre-Procedure Sedation Assessment   Time of Assessment: 1:14 PM    Expected Level: Moderate Sedation    Indication: Sedation is required for the following type of Procedure: Port removal    Sedation and procedural consent: Risks, benefits and alternatives were discussed with Patient    PO Intake: Appropriately NPO for procedure    ASA Class: Class 3 - SEVERE SYSTEMIC DISEASE, DEFINITE FUNCTIONAL LIMITATIONS.    Mallampati: Grade 1:  Soft palate, uvula, tonsillar pillars, and posterior pharyngeal wall visible    Lungs: Posterior auscultation not performed. Lungs otherwise clear.    Heart: Normal heart sounds and rate    History and physical reviewed and no updates needed. I have reviewed the lab findings, diagnostic data, medications, and the plan for sedation. I have determined this patient to be an appropriate candidate for the planned sedation and procedure and have reassessed the patient IMMEDIATELY PRIOR to sedation and procedure.    Meliton Richey PA-C

## 2018-02-23 NOTE — PROGRESS NOTES
Attestation:  Physician Attestation   I, Kaden Hardin, saw and evaluated Sebas Lopez as part of a shared visit.  I have reviewed and discussed with the advanced practice provider their history, physical and plan.     I personally reviewed the vital signs, medications, labs and imaging.     My key history or physical exam findings:      I have independently seen and examined this patient and my assessment is in agreement with the above note.  I have reviewed all tests and past medical history and my evaluation agrees with the findings in the note.     Patient doing well.  However, BC positive from port from yesterday.  GPC and GP bacillus.     Key management decisions made by me:      Port infection.  Will have port removed today and restart vancomycin.     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 CT AP 2/21 concerning for new thickening of colon secondary due to presumed infectious colitis. Blood cultures 2/20 positive for MSSE.     ADDENDUM: Spoke with ID - ok to use PICC. D/c Vanc and Flagyl (unlikely he has infectious colitis with no change in bowels and no pain). Add IV Nafcillin 2 g q4hrs. Daily blood cultures.     #MSSE bacteremia  Blood cultures from OSH 2/19 positive (2 out of 2) as well as 2/20. Growing MSSE. Sensitive to FQs, Clinda, Gent, Oxacillin, Tetracycline, Vancomycin. Resistant to Erythromycin and PCNs. Patient does have a Port-A-Cath, which is likely source of bacteremia. Now with Port culture from 2/22 growing GCP and GPB despite IV antibiotics x 48hrs.   - Received IV Vancomycin  (x2/20-2/21, 2/23) and IV Ceftriaxone 2 g QD on 2/22 (per Antimicrobial Stewardship recs and negative MecA gene). Transition to IV Nafcillin 2 g q4hrs.   - Daily blood cultures (PORT and PERIPHERAL) until negative for 72 hrs: 2/21 NGTD from 2 peripheral lines; 2/22 +port, no growth peripheral; 2/23 pending.  - Infectious disease consulted, recs appreciated  - TTE done 2/23 with persistently positive cultures, read pending  - Port removed via IR on 2/23, much appreciated. Port tip sent for culture.   - PICC placed 2/23 with negative peripheral cultures.      #Presumed infectious colitis.  #H/o ulcerative colitis.  Patient reports recent fever at home, which prompted ED arrival. WBC of 17.5 on admission. CT AP 2/20/18 concerning for thickening of the ascending, transverse, and descending colon, felt most likely to represent infectious or inflammatory etiology. Of note, patient does have a history of ulcerative colitis about 20 years ago for which he was treated with steroids and mesalamine per his report.   - Received IV Zosyn (2/20-2/22), IV Ceftriaxone (2/22), and IV Flagyl (2/22-2/23). Per ID, d/c Flagyl as unlikely to have infectious colitis with no change in bowels and no pain .  - Enteric bacteria panel and C diff PCR negative  - Consider GI consult if proves to be non-infectious    #Stage IV Sigmoid Adenocarcinoma with peritoneal carcinomatosis.  S/p ex lap 7/2017 with small bowel resection and end ileostomy. S/p 6 cycles of FOLFOX. Admitted on 12/6/2017 for ex-lap with aborted HIPEC due to diffuse carcinomatosis. Repeat CT scan showed worsening peritoneal carcinomatosis and he is getting more symptomatic in terms of worsening abdominal pain and difficulty eating because of worsening abdominal cramping. He was admitted to the hospital with worsening colon distention and colon stent was placed which improved his symptoms. Follows with Dr. Long outpatient. Planning on restarting FOLFOX on 2/21, which is  rescheduled to 3/1/18.  - Will hold chemo while inpatient  - Continue TPN    #Cancer related abdominal pain.  #Pain Assessment.  Abdominal pain has been well controlled since his previous admission 2/5-2/9 when he had a colonic stent placed. Pain is currently at baseline.  Current Pain Score 2/23/2018 2/22/2018 2/22/2018   Patient currently in pain? yes denies yes   Pain score (0-10) - - -   Pain location Abdomen Abdomen Abdomen   Pain descriptors Aching Aching;Cramping Cramping   - Sebas is currently at his base level of chronic cancer pain. No additions to home regimen needed.  - Continue home regimen of OxyContin 20mg BID with ocyxodone 5-10mg prn     #Anemia of chronic disease. Pt with Hgb of 9.0 on admission. Currently on iron supplement. No evidence of bleeding.   - Monitor, no transfusions expected while admitted.     #Thrombocytosis. Likely reactive from acute infection.  - Daily CBC    FEN:  - LR 50ml/hr  - Lyte replacement per protocol  - Regular diet as tolerated, continue home TPN    Prophy/Misc:  - GI: continue PTA omeprazole  - DVT: Lovenox ppx    Disposition: Discharge likely 2/23 pending antibiotic sensitivities and afebrile.     Above plan discussed with staff physician, Dr. Lashawn Ferrara PA-C  Hematology/Oncology  Pager: [phone]    Interval History  Continues to feel well. Afebrile. Abdominal pain is well controlled with home pain regimen. Unfortunately, the port culture is positive from yesterday despite IV abx. Discussed staying to get port removed, echo, ID consult and awaiting negative cultures. Denies chest pain, SOB, N/V, LE edema.     Physical Exam   Vital Signs with Ranges  Temp:  [95.1  F (35.1  C)-98.1  F (36.7  C)] 98.1  F (36.7  C)  Heart Rate:  [75-94] 79  Resp:  [12-18] 16  BP: ()/(58-73) 108/62  SpO2:  [99 %-100 %] 100 %  153 lbs 11.2 oz    Constitutional: Pleasant male seen laying in bed. No apparent distress, and appears stated age.  Eyes: Lids and lashes  normal, sclera clear, conjunctiva normal.  ENT: Normocephalic,  oral pharynx with moist mucus membranes, tonsils without erythema or exudates, gums normal and good dentition.   Respiratory: No increased work of breathing, good air exchange, clear to auscultation bilaterally, no crackles or wheezing.  Cardiovascular: Regular rate and rhythm, normal S1 and S2, and no murmur noted.  GI: Ileostomy with brown/green output, midline surgical scar. +BS. Soft. No tenderness on palpation.  Skin: No bruising or bleeding, no redness, warmth, or swelling, no rashes, no lesions, no jaundice.  Extremities: There is no redness, warmth, or swelling of the joints. No lower extremity edema. No cyanosis.  Neurologic: Awake, alert, oriented to name, place and time.    Vascular access: Port, c/d/i    Recent Labs  ROUTINE IP LABS (Last four results)  BMP    Recent Labs  Lab 02/23/18  0555 02/22/18  0740 02/21/18  0632 02/20/18  1029    138 138 131*   POTASSIUM 4.3 3.8 3.8 3.9   CHLORIDE 110* 108 107 100   ANNAMARIE 7.9* 7.6* 7.7* 8.1*   CO2 22 23 22 19*   BUN 15 12 15 18   CR 0.70 0.90 0.79 0.84   * 107* 120* 116*     CBC    Recent Labs  Lab 02/23/18  0555 02/22/18  0740 02/21/18  0632 02/20/18  1029   WBC 7.3 7.3 9.9 17.5*   RBC 2.61* 2.70* 2.61* 3.20*   HGB 7.2* 7.5* 7.3* 9.0*   HCT 23.2* 23.6* 22.9* 28.0*   MCV 89 87 88 88   MCH 27.6 27.8 28.0 28.1   MCHC 31.0* 31.8 31.9 32.1   RDW 16.5* 16.7* 16.7* 16.4*   * [phone]*     INR    Recent Labs  Lab 02/21/18  0632   INR 1.27*

## 2018-02-23 NOTE — PLAN OF CARE
Problem: Patient Care Overview  Goal: Plan of Care/Patient Progress Review  Outcome: No Change  00:00-07:00 am  AF Stable soft BP at baseline  OVSS   Moonlighter notified of positive blood cx from portacath w/gram positive cocci in clusters   Restarted Vancomycin tonight and day team can consider echo and ID consult vs removing portacath per note   Abdominal cramping/pain well manageable with prn Oxycodone 10 mg given x 1  Continue w/current scheduled pain regimen  Cyclic TPN and IL    Denied nausea/vomiting  Taking po fluid well  Ileostomy patent with adequate output and firming up  Up ad juan  Voiding spontaneously  Good UOP  Plan for PICC placement today and PICC teaching competed per PLC yesterday in anticipation of D/C  (?) today  Pt is otherwise stable and reports feeling well  No other acute issues overnight  Continue w/POC

## 2018-02-24 NOTE — PLAN OF CARE
Problem: Patient Care Overview  Goal: Plan of Care/Patient Progress Review  Outcome: No Change  [id number]: VSS. Afebrile. Denied nausea. Oxycodone 10mg given twice for abdominal discomfort with adequate relief. Ambulated in the hallways twice today; needs encouragement to do so. S/O was here visiting. Pt washed hair today. Calorie counts completed. TPN was programmed in the pump wrong overnight so when writer came on shift, TPN bag was full. Dr. Saez notified and verbalized to writer to re-start TPN per the order and has been infusing cycled all shift. New bag along with lipids will be hung tonight. BG spot checked and was 100. Reports legs feel edematous. Encouraged ambulation and elevating legs when in bed. Fair appetite. LR infusing via PICC at 50ml/hr. Pt changed ileostomy independently today. Pt may d/c tomorrow. Cont POC.

## 2018-02-24 NOTE — CONSULTS
[facility] ID SERVICE: NEW CONSULTATION   Sebas Lopez : 1965 Sex: male:   Medical record number [id number]  Date of Admission: 2018  Consult Requester:Kaden Hardin, *  Date of Service: 2018      REASON FOR CONSULTATION:   MSSE bacteremia    PROBLEM LIST:  1) MSSE CLABSI - s/p portacath removal 18  2) Gram positive bacilli bacteremia  3) Metastatic sigmoid adenocarcinoma with diffuse peritoneal carcinomatosis  4) Weight loss on TPN  5) Ulcerative colitis - CT abdomen with findings of active colitis, but no clinical symptoms    RECOMMENDATIONS:   1) Resume Vancomycin for coverage of both MSSE and gram positive erin from blood cultures  2) Discontinue Metronidazole  3) Repeat blood cultures tomorrow    DISCUSSION:   Mr. Lopez is a 51yo man with a history of ulcerative colitis, metastatic sigmoid adenocarcinoma with diffuse peritoneal carcinomatosis currently on TPN who was admitted on 2018 with fever, found to have MSSE bacteremia.  Likely source of bacteremia is his port and by differential time to positivity, this is a catheter related blood stream infection. TTE on 18 was negative for vegetations. Would not pursue SHRAVAN at this time unless cultures are persistently positive or there is a clinical change. Port was removed today.   Patient was treated initially with Vancomycin, but then narrowed to Ceftriaxone for MSSE blood stream infection. It's not clear to me if this was being administered via the port, but had further positive blood cultures despite Ceftriaxone. Ceftriaxone is not an antistaphylococcal agent, and is not the drug of choice for staph bacteremia. In theory, port could have been retained with antibiotics administered through the port + antibiotic or ethanol lock therapy. Now that patient has had further positive cultures, primary team has elected to remove the port.   Would plan to treat for 14 days from negative cultures. If subsequent  cultures turn positive, will need to address PICC line with either antibiotic lock or removal. Given he now also has gram positive rods growing from two separate blood culture sites, would continue therapy with Vancomycin. Can discontinue Metronidazole as he does not clinically seem to have an infectious colitis and now clearly has an alternate explanation for his present fever.   Patient seen and discussed with Infectious Diseases Staff, Dr. Mita Mustafa.  ID will continue to follow.    Danuta Potts D.O.  [facility] ID Fellow  [phone]    Attestation:  I have reviewed today's vital signs, medications, labs and imaging.  Floor time: 25 minutes, Face-to-face time: 10 minutes, Total time: 35 minutes    Sebas Lopez was seen in the hospital by Mita Mustafa on 02/23/2018, with the fellow, Dr. Danuta Potts MD. [facility] Infectious Diseases Fellow. I reviewed the history & exam. Assessment and plan were jointly made.  I agree with and have edited the fellow's note and plan of care.      Mita Mustafa MD.  ID Staff  [id number]      HPI: Mr. 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  Patient routinely checks his temperature before and after administration of TPN, the day prior to presentation he documented a fever to 102.4. He was presented to an ED in [place], [place] with unremarkable work-up. He elected to go home an self monitor his temperatures. He continued to spike fevers at presented to [facility] for further workup.  On admission, T 103, WBC 17.5, CT abdomen pelvis showed findings concerning for colitis and he was started on Vancomycin and Zosyn. Blood cultures from 2/20 grew MSSE and it was found that his OSH blood cultures also grew CoNS. Cultures from 2/21 were no growth to date, but again positive on 2/22 for GPCs in  [facility], now also with GPRs both peripherally and from the [facility].   He was switched from Vancomycin and Zosyn to Ceftriaxone and Flagyl on 2/22 to cover for both MSSE and possible colitis. TTE was obtained on 2/23/18 and negative for valvular vegetations.   Infectious Diseases is consulted for assistance with management of his MSSE CLABSI.    ANTI-INFECTIVES:   Vancomycin  Ceftriaxone    ROS:  A ten point review of systems was obtained and was negative with the exception of that which is described in the HPI.    PMH:   Past Medical History:   Diagnosis Date     Adenocarcinoma of sigmoid colon (H)     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      HERNIA REPAIR       INSERT PORT VASCULAR ACCESS Right 8/10/2017    Procedure: INSERT PORT VASCULAR ACCESS;  Single Lumen Right Chest Power Port;  Surgeon: Malena Andrew PA-C;  Location: [facility] OR     LAPAROSCOPY DIAGNOSTIC (GENERAL) N/A 12/6/2017    Procedure: LAPAROSCOPY DIAGNOSTIC (GENERAL);  Diagnostic Laparoscopy, Exploratory Laparotomy Anesthesia Block ;  Surgeon: Rob Dudley MD;  Location:  OR     LAPAROTOMY EXPLORATORY N/A 12/6/2017    Procedure: LAPAROTOMY EXPLORATORY;;  Surgeon: Rob Dudley MD;  [facility]  "OR     ORTHOPEDIC SURGERY Left     HIP ARTHROPLASTY     PICC INSERTION Right 02/23/2018    5Fr DL BioFlo PICC, 44cm (3cm external) in the R basilic vein w/ tip in the SVC RA junction       MEDICATIONS: Reviewed in chart. No known allergies.    SOCIAL HISTORY AND RISK FACTORS   Social History   Substance Use Topics     Smoking status: Never Smoker     Smokeless tobacco: Never Used     Alcohol use 3.0 oz/week     5 Cans of beer per week      Comment: none for last 4 months     History   Sexual Activity     Sexual activity: Not on file       FAMILY HISTORY:   Reviewed and non-contributory  Family History   Problem Relation Age of Onset     Hypertension Father      DIABETES Father        EXAMINATION:  /63 (BP Location: Left arm)  Temp 98.5  F (36.9  C) (Oral)  Resp 16  Ht 1.753 m (5' 9\")  Wt 69.7 kg (153 lb 11.2 oz)  SpO2 100%  BMI 22.7 kg/m2  GENERAL:  Well-developed, well-nourished, laying in bed in no acute distress.   EYES:  Eyes have anicteric sclerae without conjunctival injection, pupils constricted, but reactive.  ENT:  Oropharynx is moist without exudates or ulcers.  NECK:  Supple. No cervical lymphadenopathy.  LUNGS:  Normal respiratory effort. Lung fields are clear to auscultation bilateral.  CARDIOVASCULAR:  Regular rate and rhythm with no murmurs, gallops or rubs.  ABDOMEN:  Ostomy in place with formed stool in the bag, left abdominal fullness with mild tenderness to palpation, no peritoneal signs.   SKIN:  No stigmata of endocarditis. Scar present over R chest at site of port removal.   NEUROLOGIC:  Grossly nonfocal. Active x4 extremities.  PSYCH: Appropriate affect. Alert and oriented to person, place and time.  CURRENT LINES: R chest port with mild erythema superiorly and at the 2 o'clock position. RUE PICC in place.    RELEVANT DATA:     BASIC LABS   The following basic labs were personally reviewed:    Hematology Studies    Recent Labs   Lab Test  02/23/18   0555  02/22/18   0740  " 02/21/18   0632  02/20/18   1029  02/13/18   1025  02/09/18   0614  02/08/18   0555  02/07/18   0830  02/06/18   0544   WBC  7.3  7.3  9.9  17.5*  12.4  11.9*  13.9*  22.8*  33.2*   ANEU   --    --    --   15.4*  8.9  9.6*  11.1*  21.2*  26.8*   AEOS   --    --    --   0.0  0.2  0.1  0.5  0.0  0.2   HGB  7.2*  7.5*  7.3*  9.0*  9.9*  8.2*  8.3*  8.5*  9.4*   MCV  89  87  88  88  85  84  85  81  83   PLT  465*  414  439  637*  829*  390  320  331  341       Metabolic Studies     Recent Labs   Lab Test  02/23/18   0555  02/22/18   0740  02/21/18   0632  02/20/18   1029  02/13/18   1025   NA  138  138  138  131*  131   POTASSIUM  4.3  3.8  3.8  3.9  4.9   CHLORIDE  110*  108  107  100  95   CO2  22  23  22  19*  22   BUN  15  12  15  18  24  28   CR  0.70  0.90  0.79  0.84  0.86   GFRESTIMATED  >90  88  >90  >90  >60       Hepatic Studies    Recent Labs   Lab Test  02/20/18   1029  02/13/18   1025  02/06/18   0544  02/05/18   0916  01/31/18   0913  01/26/18   0844   BILITOTAL  0.4  0.4  0.9  0.6  0.6  0.6   ALKPHOS  220*  349  229*  213*  134  167*   ALBUMIN  2.4*  3.4  1.8*  2.5*  3.4  3.1*   AST  32  31  16  18  15  12   ALT  49  39  10  12  14  17       MICROBIOLOGY LABS  The following microbiology studies were personally reviewed:  Culture Micro   Date Value Ref Range Status   02/23/2018 PENDING  Preliminary   02/23/2018 No growth after 6 hours  Preliminary   02/23/2018 No growth after 6 hours  Preliminary   02/22/2018 (A)  Preliminary    Cultured on the 1st day of incubation:  Gram positive cocci in clusters     02/22/2018 (A)  Preliminary    Cultured on the 1st day of incubation:  Gram positive bacilli resembling diphtheroids     02/22/2018   Preliminary    Critical Value/Significant Value, preliminary result only, called to and read back by  Nick Raman RN from [facility]. 2.23.18 at 0340. GR.     02/22/2018 (A)  Preliminary    Cultured on the 2nd day of incubation:  Gram positive cocci in clusters     02/22/2018  (A)  Preliminary    Cultured on the 2nd day of incubation:  Gram positive rods     02/22/2018   Preliminary    Critical Value/Significant Value, preliminary result only, called to and read back by  Tarah Mata RN, 7D, at 1520 2.23.18. hd     02/21/2018 No growth after 2 days  Preliminary   02/21/2018 No growth after 2 days  Preliminary   02/20/2018 (A)  Preliminary    Cultured on the 2nd day of incubation:  Staphylococcus epidermidis  Susceptibility testing done on previous specimen     02/20/2018   Preliminary    Critical Value/Significant Value, preliminary result only, called to and read back by  IAN MOLINA RN @0411 2/22/18. [facility]     02/20/2018 (A)  Preliminary    Cultured on the 1st day of incubation:  Staphylococcus epidermidis  Susceptibility testing done on previous specimen     02/20/2018   Preliminary    Critical Value/Significant Value, preliminary result only, called to and read back by  IAN MOLINA RN @0142 2/22/18. [facility]     02/20/2018 No growth  Final   02/20/2018 (A)  Final    Cultured on the 1st day of incubation:  Staphylococcus epidermidis  Susceptibility testing done on previous specimen     02/20/2018   Final    Critical Value/Significant Value, preliminary result only, called to and read back by  Kelsey Jaramillo RN [facility] @ 0725 2.21.18      02/20/2018 (A)  Final    Cultured on the 1st day of incubation:  Staphylococcus epidermidis  This isolate DOES NOT demonstrate inducible clindamycin resistance in vitro. Clindamycin   is susceptible and could be used when indicated, however, erythromycin is resistant and   should not be used.     02/20/2018   Final    Critical Value/Significant Value, preliminary result only, called to and read back by   MEHUL WREN RN (7D).  02.21.18 0700 GJS     02/20/2018   Final    Susceptibility testing requested by  Shari Ferrara to ceftriaxone 2/22/18 S[facility]     02/20/2018   Final    Ceftriaxone testing not available for Staphylococcus.  Cephalosporin activity can be    deduced from Oxacillin result.     02/20/2018   Final    (Note)  POSITIVE for STAPHYLOCOCCUS EPIDERMIDIS and NEGATIVE for the mecA  gene (not resistant to methicillin) by Verigene nucleic acid test.  The mecA gene was not detected. Final identification and  antimicrobial susceptibility testing will be verified by standard  methods.    Specimen tested with Verigene multiplex, gram-positive blood culture  nucleic acid test for the following targets: Staph aureus, Staph  epidermidis, Staph lugdunensis, other Staph species, Enterococcus  faecalis, Enterococcus faecium, Streptococcus species, S. agalactiae,  S. anginosus grp., S. pneumoniae, S. pyogenes, Listeria sp., mecA  (methicillin resistance) and Lisa/B (vancomycin resistance).    Critical Value/Significant Value called to and read back by Kelsey Jaramillo RN on 2.21.18 at 0949. bw       02/05/2018 No growth  Final   02/05/2018 No growth  Final   02/05/2018 <1000 colonies/mL  urogenital susan    Final   02/05/2018 Susceptibility testing not routinely done  Final       Urine Studies    Recent Labs   Lab Test  02/20/18   1141  02/05/18   1310   LEUKEST  Negative  Negative   WBCU  0  2     IMAGING RESULTS    CT Abd/Pelvis 2/20/2018  IMPRESSION: In this patient with prior obstructing sigmoid colon mass:  1. New diffuse bowel wall thickening along the ascending, transverse and descending colon as well as the terminal ileum with adjacent inflammatory changes. Findings are concerning for colitis, likely infectious/inflammatory and less likely ischemic in etiology. No pneumatosis. No portal venous gas.  2.New sigmoid stent in place. Interval resolution of the previously described dilated loops of bowel.  3. Stable peritoneal thickening, nodularity and small amount of free fluid in the abdomen likely related to peritoneal carcinomatosis.  4. Prominent mesenteric and retroperitoneal lymph nodes concerning for metastases although some may be reactive, stable. Attention on  follow-up studies.    CXR 1 View 2/23/2018  1. Right upper extremity PICC tip projects over the right atrium. No pneumothorax.  2. No acute airspace disease.    TTE 2/23/2018  Interpretation Summary  Left ventricular size is normal. Borderline (EF 50-55%) reduced left  ventricular function is present. Regional wall motion abnormality is present.  The right ventricle is normal size. Global right ventricular function is normal.  No significant valvular dysfunction.  No valvular vegetations seen, if strong clinical suspicion consider SHRAVAN for  better visualization.  No pericardial effusion is present.  Previous study not available for comparison.

## 2018-02-24 NOTE — PLAN OF CARE
Problem: Patient Care Overview  Goal: Plan of Care/Patient Progress Review  Outcome: No Change  4453-5680: VSS. Afebrile. Denied nausea. Oxycodone 10mg given once for abdominal pain with adequate relief. BG with TPN was 86. PORT removed in IR; pt tolerated well. Site CDI. ECHO completed at bedside. PICC line placed; no bleeding at site. Good blood return from both lumens. LR infusing at 50ml/hr. Ileostomy with small amt of output; pt independent with cares. Continues on calorie counts. Was NPO until after PORT removal. Just ordered dinner. Did eat a popsicle and neto crackers. Cyclic TPN to be hung at 2000. ID consulted; Flagyl d/c'd. Continues on Vanco. Up ad juan. Cont POC.

## 2018-02-24 NOTE — PLAN OF CARE
Problem: Patient Care Overview  Goal: Plan of Care/Patient Progress Review  Outcome: No Change    AVSS, afebrile. C/o abdominal discomfort, declined pain medications. Cycle TPN and lipids running. Voiding well. Pt sleeping between cares. Cont POC.

## 2018-02-24 NOTE — PROGRESS NOTES
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 CT AP 2/21 concerning for new thickening of colon secondary due to presumed infectious colitis. Blood cultures 2/20 positive for MSSE, with subsequent port Cx + for Gram+ rods, speciation pending.     #MSSE bacteremia  # Gram+ bacilli bactermemia  Blood cultures from OSH 2/19 positive (2 out of 2) as well as 2/20. Growing MSSE. Sensitive to FQs, Clinda, Gent, Oxacillin, Tetracycline, Vancomycin. Resistant to Erythromycin and PCNs. Patient does have a Port-A-Cath, which is likely source of bacteremia. Now with Port culture from 2/22 growing GCP and GPB despite IV antibiotics x 48hrs. Cx from 2/23 NGTD  - Received IV Vancomycin (x2/20-2/21, 2/23) and IV Ceftriaxone 2 g QD on 2/22 (per Antimicrobial Stewardship recs and negative MecA gene). Transitioned to IV Vancomycin again on 2/24.   - Daily blood cultures (PORT and PERIPHERAL) until negative for 72 hrs: 2/21 NGTD from 2 peripheral lines; 2/22 +port, no growth peripheral; 2/23 pending.  - Infectious disease consulted, recs appreciated  - TTE done 2/23 with persistently positive cultures, read pending  - Port removed via IR on 2/23, much appreciated. Port tip sent for culture.   - PICC placed 2/23 with negative peripheral cultures.      #Presumed infectious colitis.  #H/o ulcerative colitis.  Patient reports recent fever at home, which prompted ED arrival. WBC of 17.5 on admission. CT AP 2/20/18 concerning for thickening of the ascending, transverse, and descending colon, felt most likely to represent infectious or inflammatory etiology. Of note, patient does have a history of ulcerative colitis about 20 years ago for which he was treated with steroids and  mesalamine per his report.   - Received IV Zosyn (2/20-2/22), IV Ceftriaxone (2/22), and IV Flagyl (2/22-2/23).   - Enteric bacteria panel and C diff PCR negative  - Consider GI consult if proves to be non-infectious    #Stage IV Sigmoid Adenocarcinoma with peritoneal carcinomatosis.  S/p ex lap 7/2017 with small bowel resection and end ileostomy. S/p 6 cycles of FOLFOX. Admitted on 12/6/2017 for ex-lap with aborted HIPEC due to diffuse carcinomatosis. Repeat CT scan showed worsening peritoneal carcinomatosis and he is getting more symptomatic in terms of worsening abdominal pain and difficulty eating because of worsening abdominal cramping. He was admitted to the hospital with worsening colon distention and colon stent was placed which improved his symptoms. Follows with Dr. Long outpatient. Planning on restarting FOLFOX on 2/21, which is rescheduled to 3/1/18.  - Will hold chemo while inpatient  - Continue TPN    #Cancer related abdominal pain.  #Pain Assessment.  Abdominal pain has been well controlled since his previous admission 2/5-2/9 when he had a colonic stent placed. Pain is currently at baseline.  Current Pain Score 2/24/2018 2/24/2018 2/24/2018   Patient currently in pain? denies yes yes   Pain score (0-10) - - -   Pain location - Abdomen Abdomen   Pain descriptors - Aching Aching   - Sebas is currently at his base level of chronic cancer pain. No additions to home regimen needed.  - Continue home regimen of OxyContin 20mg BID with ocyxodone 5-10mg prn     #Anemia of chronic disease. Pt with Hgb of 9.0 on admission. Currently on iron supplement. No evidence of bleeding.   - Monitor, no transfusions expected while admitted.     #Thrombocytosis. Likely reactive from acute infection.  - Daily CBC    FEN:  - LR 50ml/hr  - Lyte replacement per protocol  - Regular diet as tolerated, continue home TPN    Prophy/Misc:  - GI: continue PTA omeprazole  - DVT: Lovenox ppx    Disposition: Discharge likely 2/24  pending antibiotic sensitivities and afebrile.     Above plan discussed with staff physician, Dr. Lashawn Saez PA-C  Hematology/Oncology  Pager: [phone]    Interval History  Doing well, afebrile, denies pain, weakness, N/V/D, or other symptoms. TPN was planned to be cycled but was not started last night; will run today/tonight.      Physical Exam   Vital Signs with Ranges  Temp:  [96.5  F (35.8  C)-98.6  F (37  C)] 98  F (36.7  C)  Heart Rate:  [] 99  Resp:  [14-18] 18  BP: (102-115)/(57-75) 105/72  SpO2:  [95 %-100 %] 95 %  151 lbs 11.2 oz    Constitutional: Pleasant male seen laying in bed. No apparent distress, and appears stated age.  Eyes: Lids and lashes normal, sclera clear, conjunctiva normal.  ENT: Normocephalic,  oral pharynx with moist mucus membranes, tonsils without erythema or exudates, gums normal and good dentition.   Respiratory: No increased work of breathing, good air exchange, clear to auscultation bilaterally, no crackles or wheezing.  Cardiovascular: Regular rate and rhythm, normal S1 and S2, and no murmur noted.  GI: Ileostomy with brown/green output, midline surgical scar. +BS. Soft. No tenderness on palpation.  Skin: No bruising or bleeding, no redness, warmth, or swelling, no rashes, no lesions, no jaundice. PICC site nontender, running TPN.   Extremities: There is no redness, warmth, or swelling of the joints. No lower extremity edema. No cyanosis.  Neurologic: Awake, alert, oriented to name, place and time.        Recent Labs  ROUTINE IP LABS (Last four results)  BMP    Recent Labs  Lab 02/24/18  0630 02/23/18  0555 02/22/18  0740 02/21/18  0632    138 138 138   POTASSIUM 4.3 4.3 3.8 3.8   CHLORIDE 107 110* 108 107   ANNAMARIE 8.2* 7.9* 7.6* 7.7*   CO2 23 22 23 22   BUN 12 15 12 15   CR 0.74 0.70 0.90 0.79   GLC 88 101* 107* 120*     CBC    Recent Labs  Lab 02/24/18  0630 02/23/18  0555 02/22/18  0740 02/21/18  0632   WBC 7.8 7.3 7.3 9.9   RBC 2.68* 2.61* 2.70*  2.61*   HGB 7.5* 7.2* 7.5* 7.3*   HCT 23.4* 23.2* 23.6* 22.9*   MCV 87 89 87 88   MCH 28.0 27.6 27.8 28.0   MCHC 32.1 31.0* 31.8 31.9   RDW 16.5* 16.5* 16.7* 16.7*   * 465* 414 439     INR    Recent Labs  Lab 02/21/18  0632   INR 1.27*

## 2018-02-24 NOTE — PLAN OF CARE
Problem: Patient Care Overview  Goal: Plan of Care/Patient Progress Review  Afebrile, VSS, pain in lower left quadrant of abdomen given oxycodone x1. Cyclic TPN and Lipids started. No other complaints. Continue plan of care

## 2018-02-24 NOTE — PROGRESS NOTES
Calorie Counts    Intake recorded for: 2/23 Kcals: 1280  Protein: 36g    # Meals recorded: 1 meal: 100% stir-wang vegetables w/ brown rice & chicken, grapes, pears, 8 oz. Orange juice, pudding, 2 popsicle, 3 neto crackers    # Supplements recorded: 0

## 2018-02-25 PROBLEM — R78.81 BACTEREMIA: Status: ACTIVE | Noted: 2018-01-01

## 2018-02-25 NOTE — DISCHARGE SUMMARY
[facility], [place]    Discharge Summary  Hematology / Oncology    Date of Admission:  2/20/2018  Date of Discharge:  2/25/2018  Discharging Provider: Jay Saez  Date of Service (when I saw the patient): 02/25/18    Discharge Diagnoses   Patient Active Problem List   Diagnosis     Peritoneal carcinomatosis (H)     Cancer of sigmoid colon (H)     Iron deficiency anemia due to chronic blood loss     Diarrhea, unspecified type     Hypokalemia     Large bowel obstruction     Colon cancer (H)     Colitis presumed infectious       Hospital Course   Sebas Lopez is a 52 year old male with history of ulcerative colitis with a diagnosis of Stage IV Sigmoid Adenocarcinoma with peritoneal carcinomatosis. admitted with generalized weakness, fevers, and abdominal pain.  CT CAP 2/20 showed diffuse bowel wall thickening concerning for infectious colitis.  He was initiated on IV Vancomycin and Zosyn.  Blood cultures became positive for MSSE, and he was briefly switched to IV Ceftriaxone and Flagyl.  However, subseqeunt blood cultures drawn from his PortaCath remained positive, and additionally became positive for G+ bacilli, necessitating reinitiation of Vancomycin.  Portacath was removed, and PICC line placed.  A TTE was negative for vegetations, and his fever curve normalized.  ID agreed with Vancomycin for 2 weeks from the date of the first negative blood cultures (2/23).      Management of remaining hospital problems is outlined below:       #Stage IV Sigmoid Adenocarcinoma with peritoneal carcinomatosis.  S/p ex lap 7/2017 with small bowel resection and end ileostomy. S/p 6 cycles of FOLFOX. Admitted on 12/6/2017 for ex-lap with aborted HIPEC due to diffuse carcinomatosis. Repeat CT scan showed worsening peritoneal carcinomatosis and he is getting more symptomatic in terms of worsening abdominal pain and difficulty eating because of worsening abdominal cramping. He was admitted to the  hospital with worsening colon distention and colon stent was placed which improved his symptoms. Follows with Dr. Long outpatient. Planning on restarting FOLFOX on 2/21, which is rescheduled to 3/1/18.  - Will hold chemo while inpatient  #Cancer related abdominal pain.  #Pain Assessment.  Abdominal pain has been well controlled since his previous admission 2/5-2/9 when he had a colonic stent placed. Pain is currently at baseline.  Current Pain Score 2/24/2018 2/24/2018 2/24/2018   Patient currently in pain? denies yes yes   Pain score (0-10) - - -   Pain location - Abdomen Abdomen   Pain descriptors - Aching Aching   - Sebas is currently at his base level of chronic cancer pain. No additions to home regimen needed.  - Continue home regimen of OxyContin 20mg BID with ocyxodone 5-10mg prn   #Anemia of chronic disease. Pt with Hgb of 9.0 on admission. Currently on iron supplement. No evidence of bleeding.   - Monitor, no transfusions expected while admitted.   #Thrombocytosis. Likely reactive from acute infection.  - Daily CBC    # Severe malnutrition in the context of acute illness  --PRN Snacks/Supplements with meals   --Future/Additional Recommendations:  If possible, cycle PN to 12 hr cycle; or taper PN off pending calorie count collection and maintenance of lytes, weight stability, and ileostomy outputs    Prophy/Misc:  - GI: continue PTA omeprazole  - DVT: Lovenox ppx    RECOMMENDATIONS FOLLOWING HOSPITAL DISCHARGE:  --You will need to remain on IV Vancomycin for 14 days from the date of your first negative blood culture (2/23), which will be March 7th.  Your Vancomycin levels will need to be monitored regularly by your Home Infusion company; they can adjust dosing of Vancomycin as necessary to maintain a target therapeutic range of 15-20 ug/mL.    --Follow up with [facility] during your next scheduled visit.     Pending Results   These results will be followed up by Linda Alves during appointment on  3/1:    Unresulted Labs Ordered in the Past 30 Days of this Admission     Date and Time Order Name Status Description    2/24/2018 2330 Blood culture Preliminary     2/24/2018 2330 Blood culture Preliminary     2/23/2018 2330 Blood culture Preliminary     2/23/2018 2330 Blood culture Preliminary     2/22/2018 2330 Blood culture Preliminary     2/22/2018 2330 Blood culture Preliminary     2/21/2018 2330 Blood culture Preliminary     2/21/2018 2330 Blood culture Preliminary     2/21/2018 1419 Blood culture Preliminary     2/21/2018 1419 Blood culture Preliminary          Exam Date Exam Time Accession # Performing Department Results      2/20/18  1:20 PM [id number] [facility], [place], [place]        [employer] Interactive Report and InfoRx      View the interactive report       PACS Images      Show images for CT Abdomen Pelvis w Contrast       Study Result      EXAMINATION: CT ABDOMEN PELVIS W CONTRAST, 2/20/2018 1:20 PM     TECHNIQUE:  Helical CT images from the lung bases through the  symphysis pubis were obtained with IV contrast. Contrast dose: 82 cc  of isovue 370     COMPARISON: 2/5/2018     HISTORY: abdominal pain, fever.      FINDINGS:     Lung bases: No pleural effusions. Dependent atelectasis in both lower  lobes.     Abdomen and pelvis: In this patient with prior obstructing sigmoid  colon mass there is a new sigmoid stent in place, patent. Interval  resolution of the previously described dilated loops of bowel. New  diffuse bowel wall thickening along the ascending, transverse and  descending colon and the terminal ileum with adjacent inflammatory  changes with engorged vasculature and mesenteric fat stranding. No  pneumatosis. No portal venous gas. Right upper quadrant ileostomy.  Otherwise, small and bowel loops are unremarkable.      Again noted peritoneal soft tissue thickening throughout the abdomen,  particularly along the anterior abdominal wall not significantly  changed from the prior study.  Suggestion of hernia mesh repair in the  midline of the anterior abdominal wall. Small amount of free fluid in  the abdomen. No free air. Again noted prominent mesenteric and  retroperitoneal lymph nodes, measuring up to 1.3 cm short axis,  unchanged.     No suspicious liver lesions. No biliary tree dilation. Patent hepatic  vasculature. Unremarkable gallbladder. Homogeneous pancreatic  parenchyma. Main pancreatic duct is not dilated. The spleen is not  enlarged.     Adrenal glands are unremarkable. No renal stones or hydronephrosis  bilaterally. Extrarenal pelvis bilaterally. Subcentimeter cortical  foci in both kidneys, too small to characterize, likely representing  renal cysts. Urinary bladder within normal limits. Pelvic phleboliths.  Symmetric seminal vesicles. The prostate is not enlarged.     Bones: Degenerative changes of the spine. Degenerative changes of  bilateral SI joints. Scattered sclerotic foci throughout the bones.  Postoperative changes of left total hip arthroplasty. Stable lucent  focus in the left acetabulum, benign-appearing. No suspicious bone  lesions.         IMPRESSION: In this patient with prior obstructing sigmoid colon mass:  1. New diffuse bowel wall thickening along the ascending, transverse  and descending colon as well as the terminal ileum with adjacent  inflammatory changes. Findings are concerning for colitis, likely  infectious/inflammatory and less likely ischemic in etiology. No  pneumatosis. No portal venous gas.  2.New sigmoid stent in place. Interval resolution of the previously  described dilated loops of bowel.  3. Stable peritoneal thickening, nodularity and small amount of free  fluid in the abdomen likely related to peritoneal carcinomatosis.  4. Prominent mesenteric and retroperitoneal lymph nodes concerning for  metastases although some may be reactive, stable. Attention on  follow-up studies.     I have personally reviewed the examination and initial  interpretation  and I agree with the findings.         Code Status   Full Code    Primary Care Physician   NGUYỄN EDGAR    Physical Exam   Temp: 97.7  F (36.5  C) Temp src: Oral BP: 113/70   Heart Rate: 82 Resp: 18 SpO2: 100 % O2 Device: None (Room air)    Vitals:    02/23/18 0809 02/24/18 0759 02/25/18 0900   Weight: 69.7 kg (153 lb 11.2 oz) 68.8 kg (151 lb 11.2 oz) 69.1 kg (152 lb 6.4 oz)     Vital Signs with Ranges  Temp:  [96.8  F (36  C)-98.7  F (37.1  C)] 97.7  F (36.5  C)  Heart Rate:  [82-99] 82  Resp:  [16-18] 18  BP: (103-116)/(64-76) 113/70  SpO2:  [95 %-100 %] 100 %  I/O last 3 completed shifts:  In: 5568.89 [P.O.:360; I.V.:1050]  Out: 2680 [Urine:2680]    Constitutional: Awake, alert, cooperative, no apparent distress, and appears stated age.  Eyes: Lids and lashes normal, pupils equal, round and reactive to light, extra ocular muscles intact, sclera clear, conjunctiva normal.  ENT: Normocephalic, without obvious abnormality, atraumatic, sinuses nontender on palpation, external ears without lesions, oral pharynx with moist mucus membranes, tonsils without erythema or exudates, gums normal and good dentition.  Respiratory: No increased work of breathing, good air exchange, clear to auscultation bilaterally, no crackles or wheezing.  Cardiovascular: Normal apical impulse, regular rate and rhythm, normal S1 and S2, no S3 or S4, and no murmur noted.  GI: No scars, normal bowel sounds, soft; ostomy bag C/D/I; tenderness to deep palpation in LLQ.    Skin: Slight erythema and clear discharge at site of prior PortaCath.  No fluctuance, nontender.    Neurologic: Awake, alert, oriented to name, place and time.  Cranial nerves II-XII are grossly intact.  Motor is 5 out of 5 bilaterally.  Cerebellar finger to nose, heel to shin intact.  Sensory is intact.  Babinski down going, Romberg negative, and gait is normal.   Neuropsychiatric: Calm, normal eye contact, alert, normal affect, oriented to self, place, time and  situation, memory for past and recent events intact and thought process normal.    Time Spent on this Encounter   IJay, personally saw the patient today and spent greater than 30 minutes discharging this patient.    Discharge Disposition   Discharged to home  Condition at discharge: Stable    Consultations This Hospital Stay   PHARMACY TO DOSE VANCO  MEDICATION HISTORY IP PHARMACY CONSULT  PHARMACY/NUTRITION TO START AND MANAGE TPN  VASCULAR ACCESS ADULT IP CONSULT  PATIENT [facility] IP CONSULT  VASCULAR ACCESS CARE ADULT IP CONSULT  PHARMACY TO DOSE VANCO  INFECTIOUS DISEASE GENERAL ADULT IP CONSULT  INTERVENTIONAL RADIOLOGY ADULT/PEDS IP CONSULT  PHARMACY TO DOSE VANCO    Discharge Orders     Home infusion referral       Discharge Medications   Current Discharge Medication List      START taking these medications    Details   vancomycin 1,500 mg Inject 1,500 mg into the vein every 12 hours for 14 days  Refills: 0    Associated Diagnoses: Bacteremia         CONTINUE these medications which have NOT CHANGED    Details   parenteral nutrition - PTA/DISCHARGE ORDER Inject into the vein daily See medication history note from 2/20/18 for most recent TPN formula.      oxyCODONE (OXYCONTIN) 10 MG 12 hr tablet Take 2 tablets (20 mg) by mouth every 12 hours  Qty: 120 tablet, Refills: 0    Associated Diagnoses: Peritoneal carcinomatosis (H); Cancer of sigmoid colon (H)      ondansetron (ZOFRAN-ODT) 8 MG ODT tab Take 1 tablet (8 mg) by mouth every 8 hours  Qty: 30 tablet, Refills: 1    Associated Diagnoses: Nausea      omeprazole (PRILOSEC) 20 MG CR capsule Take 1 capsule (20 mg) by mouth daily  Qty: 30 capsule, Refills: 11    Associated Diagnoses: Diarrhea, unspecified type      opium tincture tincture Take 0.6 mLs (6 mg) by mouth 4 times daily  Qty: 72 mL, Refills: 0    Associated Diagnoses: Diarrhea, unspecified type; Cancer of sigmoid colon (H); Peritoneal carcinomatosis (H)      diphenoxylate-atropine  (LOMOTIL) 2.5-0.025 MG per tablet Take 1-2 tablets by mouth 4 times daily as needed for diarrhea  Qty: 240 tablet, Refills: 5    Associated Diagnoses: Diarrhea due to drug      LORazepam (ATIVAN) 0.5 MG tablet Take 1 tablet (0.5 mg) by mouth every 4 hours as needed (Anxiety, Nausea/Vomiting or Sleep)  Qty: 30 tablet, Refills: 5    Associated Diagnoses: Peritoneal carcinomatosis (H); Cancer of sigmoid colon (H)      Psyllium (METAMUCIL FIBER) 51.7 % PACK Take 1 packet by mouth 3 times daily  Qty: 90 each, Refills: 3    Associated Diagnoses: Diarrhea, unspecified type      Ferrous Sulfate (IRON SUPPLEMENT PO) Take 325 mg by mouth 2 times daily (with meals)       amylase-lipase-protease (CREON) 51991 UNITS CPEP Take 2 capsules (72,000 Units) by mouth 3 times daily (with meals) And 1 capsule with each snack tid.  Qty: 240 capsule, Refills: 3    Comments: Mail to patient if not yet due to be filled.  Associated Diagnoses: Diarrhea, unspecified type; Peritoneal carcinomatosis (H); Cancer of sigmoid colon (H)         STOP taking these medications       oxyCODONE (OXY-IR) 5 MG capsule Comments:   Reason for Stopping:         loperamide (IMODIUM) 2 MG capsule Comments:   Reason for Stopping:             Allergies   No Known Allergies  Data   Most Recent 3 CBC's:  Recent Labs   Lab Test  02/25/18   0603  02/24/18   0630  02/23/18   0555   WBC  7.8  7.8  7.3   HGB  7.4*  7.5*  7.2*   MCV  88  87  89   PLT  484*  483*  465*      Most Recent 3 BMP's:  Recent Labs   Lab Test  02/25/18   0603  02/24/18   0630  02/23/18   0555   NA  141  138  138   POTASSIUM  4.8  4.3  4.3   CHLORIDE  110*  107  110*   CO2  24  23  22   BUN  21  12  15   CR  0.70  0.74  0.70   ANIONGAP  7  7  7   ANNAMARIE  8.0*  8.2*  7.9*   GLC  117*  88  101*     Most Recent 2 LFT's:  Recent Labs   Lab Test  02/20/18   1029  02/13/18   1025   AST  32  31   ALT  49  39   ALKPHOS  220*  349   BILITOTAL  0.4  0.4     Most Recent INR's and Anticoagulation Dosing  History:  Anticoagulation Dose History     Recent Dosing and Labs Latest Ref Rng & Units 8/10/2017 1/26/2018 2/6/2018 2/7/2018 2/21/2018    INR 0.86 - 1.14 1.12 1.25(H) 2.31(H) 2.14(H) 1.27(H)        Most Recent 3 Troponin's:No lab results found.  Most Recent Cholesterol Panel:  Recent Labs   Lab Test  02/21/18   0632   TRIG  84     Most Recent 6 Bacteria Isolates From Any Culture (See EPIC Reports for Culture Details):  Recent Labs   Lab Test  02/25/18   0603  02/25/18   0530  02/24/18   0644  02/24/18   0630  02/23/18   1335  02/23/18   0606   CULT  No growth after 5 hours  No growth after 5 hours  No growth after 1 day  No growth after 1 day  No growth  No growth after 2 days

## 2018-02-25 NOTE — PLAN OF CARE
Problem: Patient Care Overview  Goal: Plan of Care/Patient Progress Review  Outcome: No Change    AVSS, afebrile. C/o abdominal pain, oxycodone 10mg given x 1. Pt slept well overnight. Cycle TPN/lipids running. Voiding well. Poss d/c today. Cont POC.

## 2018-02-25 NOTE — PROGRESS NOTES
Transition Planning Update    Received update that Mr. Lopez will be discharged today.  This writer updated [facility] who will update [facility] in [place] about plans of discharge today.  Bedside RN will page the weekend Pharmacist and ask for home TPN orders.  Home TPN orders will be faxed to [facility] (bedside RN will ask pharmacist to fax the order when it is complete.).  Discharge orders will be printed by [facility] once signed by MD.    Patient will follow up as designated in discharge plans of care.  Inpatient Care Management RN will be available to assist with updated plans of care prn.    NYDIA Murillo.S.N., P.H.N.,R.N.         Pager

## 2018-02-25 NOTE — PLAN OF CARE
Problem: Patient Care Overview  Goal: Plan of Care/Patient Progress Review  Outcome: Adequate for Discharge Date Met: 02/25/18  Discharge  D: Orders for discharge and outpatient medications written.  I: Home medications and return to clinic schedule reviewed with patient. Discharge instructions and parameters for calling Health Care Provider reviewed. Patient left at 1345 accompanied by S/O Brian.   A: Patient/family verbalized understanding and was ready for discharge. Oxycodone 10mg given for abdominal pain with adequate relief.  P: Patient instructed to  medications in Pharmacy. Follow up as scheduled on Thursday, March 1st.

## 2018-02-25 NOTE — PROGRESS NOTES
[facility] ID SERVICE: ONGOING CONSULTATION   Sebas Lopez : 1965 Sex: male:   Medical record number [id number] Attending Physician: No att. 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 Lopez is a 51yo man with a history of ulcerative colitis, metastatic sigmoid adenocarcinoma with diffuse peritoneal carcinomatosis currently on TPN who was admitted on 2018 with fever, found to have MSSE bacteremia.  Likely source of bacteremia is his port and by differential time to positivity, this is a catheter related blood stream infection. TTE on 18 was negative for vegetations. He is s/p port removal on 18.  Patient was treated initially with Vancomycin, but then narrowed to Ceftriaxone for MSSE blood stream infection. It's not clear to me if this was being administered via the port, but had further positive blood cultures despite Ceftriaxone. Ceftriaxone is not an antistaphylococcal agent, and is not the drug of choice for staph bacteremia. In theory, port could have been retained with antibiotics administered through the port + antibiotic or ethanol lock therapy. Now that patient has had further positive cultures, primary team has elected to remove the port.   Would treat for 14 days from negative cultures for both MSSE and Lactobacillus bacteremia. Lactobacillus is not always sensitive to vancomycin -sensis are pending and patient has been discharged by primary team. Patient is otherwise asymptomatic, no further positive blood  cultures. If he has further fevers after discharge, would consider treating lactobacillus. Otherwise primary therapy is removing the infected port.   Above discussed with Infectious Diseases Staff, Dr. Mita Mustafa.     Danuta Potts D.O.  [facility] ID Fellow  [phone]       CHIEF INFECTIOUS DISEASES COMPLAINT:  MSSE Bacteremia    INTERVAL HISTORY:   Doing well since last seen. No fever, chills, chest pain, shortness of breath. Has some erythema over the site of the port removal. States he had a similar reaction to the surgical glue they used on his abdominal surgical site.     ROS: A five-point review of systems was obtained and was negative with the exception of that which is described above.  Allergies   Allergen Reactions     Adhesive Tape Blisters     Noted on tape around Portacath during hospitalization; also with prior abdominal surgery tape.       Allergies were reviewed.    Current Outpatient Prescriptions   Medication Sig Dispense Refill     vancomycin 1,500 mg Inject 1,500 mg into the vein every 12 hours for 14 days  0     parenteral nutrition - PTA/DISCHARGE ORDER Inject into the vein daily See medication history note from 2/20/18 for most recent TPN formula.       oxyCODONE (OXYCONTIN) 10 MG 12 hr tablet Take 2 tablets (20 mg) by mouth every 12 hours 120 tablet 0     ondansetron (ZOFRAN-ODT) 8 MG ODT tab Take 1 tablet (8 mg) by mouth every 8 hours 30 tablet 1     omeprazole (PRILOSEC) 20 MG CR capsule Take 1 capsule (20 mg) by mouth daily 30 capsule 11     opium tincture tincture Take 0.6 mLs (6 mg) by mouth 4 times daily (Patient taking differently: Take 6 mg by mouth 4 times daily as needed for diarrhea ) 72 mL 0     diphenoxylate-atropine (LOMOTIL) 2.5-0.025 MG per tablet Take 1-2 tablets by mouth 4 times daily as needed for diarrhea 240 tablet 5     LORazepam (ATIVAN) 0.5 MG tablet Take 1 tablet (0.5 mg) by mouth every 4 hours as needed (Anxiety, Nausea/Vomiting or Sleep) 30 tablet 5  "    Psyllium (METAMUCIL FIBER) 51.7 % PACK Take 1 packet by mouth 3 times daily (Patient taking differently: Take 1 packet by mouth 3 times daily as needed ) 90 each 3     Ferrous Sulfate (IRON SUPPLEMENT PO) Take 325 mg by mouth 2 times daily (with meals)        amylase-lipase-protease (CREON) 90947 UNITS CPEP Take 2 capsules (72,000 Units) by mouth 3 times daily (with meals) And 1 capsule with each snack tid. (Patient not taking: Reported on 2/20/2018) 240 capsule 3       CURRENT ANTI-INFECTIVES:   Vancomycin     EXAMINATION:   /70 (BP Location: Left arm)  Temp 97.7  F (36.5  C) (Oral)  Resp 18  Ht 1.753 m (5' 9\")  Wt 69.1 kg (152 lb 6.4 oz)  SpO2 100%  BMI 22.51 kg/m2  GENERAL:  Well-developed, well-nourished, laying in bed in no acute distress.   EYES:  Eyes have anicteric sclerae without conjunctival injection, pupils constricted, but reactive.  ENT:  Oropharynx is moist without exudates or ulcers.  LUNGS:  Normal respiratory effort. Lung fields are clear to auscultation bilateral.  CARDIOVASCULAR:  Regular rate and rhythm with no murmurs, gallops or rubs.  ABDOMEN:  Ostomy in place with liquid stool in the bag, left abdominal fullness, no peritoneal signs.   SKIN:  No stigmata of endocarditis. Scar present over R chest at site of port removal with erythema and fine pustular lesions, pruritic per patient.  NEUROLOGIC:  Grossly nonfocal. Active x4 extremities.  PSYCH: Appropriate affect. Alert and oriented to person, place and time.  CURRENT LINES: RUE PICC in place.    NEW DATA/RESULTS:   All interval basic labs, microbiology results and imaging were reviewed.    Culture Micro   Date Value Ref Range Status   02/25/2018 No growth after 5 hours  Preliminary   02/25/2018 No growth after 5 hours  Preliminary   02/24/2018 No growth after 1 day  Preliminary   02/24/2018 No growth after 1 day  Preliminary   02/23/2018 No growth  Final       No lab results found.  Recent Labs   Lab Test  02/25/18   0603  " 02/24/18   0630  02/23/18   0555  02/22/18   0740  02/21/18   0632  02/20/18   1029   WBC  7.8  7.8  7.3  7.3  9.9  17.5*     Recent Labs   Lab Test  02/25/18   0603  02/24/18   0630  02/23/18   0555  02/22/18   0740   CR  0.70  0.74  0.70  0.90   GFRESTIMATED  >90  >90  >90  88       Hematology Studies  Recent Labs   Lab Test  02/25/18   0603  02/24/18   0630  02/23/18   0555  02/22/18   0740  02/21/18   0632  02/20/18   1029  02/13/18   1025  02/09/18   0614  02/08/18   0555  02/07/18   0830  02/06/18   0544   WBC  7.8  7.8  7.3  7.3  9.9  17.5*  12.4  11.9*  13.9*  22.8*  33.2*   ANEU   --    --    --    --    --   15.4*  8.9  9.6*  11.1*  21.2*  26.8*   AEOS   --    --    --    --    --   0.0  0.2  0.1  0.5  0.0  0.2   HCT  23.6*  23.4*  23.2*  23.6*  22.9*  28.0*  29.4  24.8*  25.0*  24.4*  27.3*   PLT  484*  483*  465*  414  439  637*  829*  390  320  331  341       Metabolic  Recent Labs   Lab Test  02/25/18   0603  02/24/18   0630  02/23/18   0555   NA  141  138  138   BUN  21  12  15   CO2  24  23  22   CR  0.70  0.74  0.70   GFRESTIMATED  >90  >90  >90       Hepatic Studies  Recent Labs   Lab Test  02/20/18   1029  02/13/18   1025  02/06/18   0544   BILITOTAL  0.4  0.4  0.9   ALKPHOS  220*  349  229*   ALBUMIN  2.4*  3.4  1.8*   AST  32  31  16   ALT  49  39  10       IMAGING RESULTS     CT Abd/Pelvis 2/20/2018  IMPRESSION: In this patient with prior obstructing sigmoid colon mass:  1. New diffuse bowel wall thickening along the ascending, transverse and descending colon as well as the terminal ileum with adjacent inflammatory changes. Findings are concerning for colitis, likely infectious/inflammatory and less likely ischemic in etiology. No pneumatosis. No portal venous gas.  2.New sigmoid stent in place. Interval resolution of the previously described dilated loops of bowel.  3. Stable peritoneal thickening, nodularity and small amount of free fluid in the abdomen likely related to peritoneal  carcinomatosis.  4. Prominent mesenteric and retroperitoneal lymph nodes concerning for metastases although some may be reactive, stable. Attention on follow-up studies.     CXR 1 View 2/23/2018  1. Right upper extremity PICC tip projects over the right atrium. No pneumothorax.  2. No acute airspace disease.     TTE 2/23/2018  Interpretation Summary  Left ventricular size is normal. Borderline (EF 50-55%) reduced left  ventricular function is present. Regional wall motion abnormality is present.  The right ventricle is normal size. Global right ventricular function is normal.  No significant valvular dysfunction.  No valvular vegetations seen, if strong clinical suspicion consider SHRAVAN for  better visualization.  No pericardial effusion is present.  Previous study not available for comparison.

## 2018-02-25 NOTE — PHARMACY-VANCOMYCIN DOSING SERVICE
Pharmacy Vancomycin Note  Date of Service 2018  Patient's  1965   52 year old, male    Indication: Bacteremia  Goal Trough Level: 15-20 mg/L  Day of Therapy: 3  Current Vancomycin regimen:  1500 mg IV q12h    Current estimated CrCl = Estimated Creatinine Clearance: 117.4 mL/min (based on Cr of 0.7).    Creatinine for last 3 days  2018:  5:55 AM Creatinine 0.70 mg/dL  2018:  6:30 AM Creatinine 0.74 mg/dL  2018:  6:03 AM Creatinine 0.70 mg/dL    Recent Vancomycin Levels (past 3 days)  2018:  8:13 AM Vancomycin Level 17.2 mg/L    Vancomycin IV Administrations (past 72 hours)                   vancomycin (VANCOCIN) 1,500 mg in sodium chloride 0.9 % 250 mL intermittent infusion (mg) 1,500 mg New Bag 18     1,500 mg New Bag  075     1,500 mg New Bag 18    vancomycin (VANCOCIN) 1,500 mg in sodium chloride 0.9 % 250 mL intermittent infusion (mg) 1,500 mg New Bag 18 1017                Nephrotoxins and other renal medications (Future)    Start     Dose/Rate Route Frequency Ordered Stop    18  vancomycin (VANCOCIN) 1,500 mg in sodium chloride 0.9 % 250 mL intermittent infusion      1,500 mg  over 90 Minutes Intravenous EVERY 12 HOURS 18 1704               Contrast Orders - past 72 hours (72h ago through future)    Start     Dose/Rate Route Frequency Ordered Stop    18 1045  perflutren diluted 1mL to 2mL with saline (OPTISON) diluted injection 6 mL      6 mL Intravenous ONCE 18 1044 18 1045          Interpretation of levels and current regimen:  Trough level is  Therapeutic    Has serum creatinine changed > 50% in last 72 hours: No    Urine output:  good urine output    Renal Function: Stable    Plan:  1.  Continue Current Dose  2.  Pharmacy will check trough levels as appropriate in 3-5 Days.    3. Serum creatinine levels will be ordered a minimum of twice weekly.      Rosita Rodirguez, PharmD        .

## 2018-02-26 NOTE — PROGRESS NOTES
This is a recent snapshot of the patient's [facility] medical record.  For current drug dose and complete information and questions, call [phone] or In Basket pool, [facility] home infusion ([id number])  CSN Number:  [id number]

## 2018-02-28 NOTE — TELEPHONE ENCOUNTER
Pt called triage to let us know he was inpatient  2/20-2/25 with infectious colitis. He asks if he needs to come in for his follow up and chemo tomorrow.  He is still on IV vancomycin every 12 hours. Spoke to Linda who will see pt as scheduled tomorrow for hospital discharge. We will keep the infusion appt in case pt needs fluids but he will not receive chemo. Pt notified. He had no other concerns/questions.

## 2018-03-01 NOTE — LETTER
3/1/2018      RE: Sebas Lopez  [address] [place] [address]       Oncology follow up visit:  Date on this visit: Mar 1, 2018    CC  sigmoid adenocarcinoma with peritoneal carcinomatosis    Primary Physician: Waylon Han     History Of Present Illness:     Please see previous note for details. I have copied and updated from prior notes.   Sebas is a 52-year-old male who has a history of ulcerative colitis diagnosed more than 20 years ago, but he has not had medical management for that.  He was doing well, but in 05/2017 he presented with a few weeks of abdominal pain.  At that time, his imaging showed that he had a sigmoid wall thickening with adjacent mesenteric lymphadenopathy and free fluid near the abdominal wall mesh from his prior hernia surgery.  He subsequently underwent a colonoscopy which was incomplete and showed an obstructing sigmoid colon mass.  The biopsy from the sigmoid mass showed sigmoid adenocarcinoma.  He then developed obstructive symptoms and underwent exploratory laparotomy on 07/10/2017.  During that he was found to have diffuse peritoneal carcinomatosis.  Extensive lysis of adhesions was performed and a small bowel resection was performed with end ileostomy.  The biopsies from the multiple large peritoneal metastasis also were positive for metastatic adenocarcinoma. We still haven't received testing on MSI or NGS panel back     He started palliative FOLFOX on 8/11/17. C#6 given on 10/26/17  After 6 cycles, he has stable disease    On 12/6/17, he had Diagnostic laparoscopy and Exploratory laparotomy, but HIPEC was not performed as Peritoneal cancer index was 26 with diffuse involvement of the small bowel as well as the small bowel mesentery.     He then presented to ED on 1/26 with abdominal pain and associated nausea. CT A/P on 1/26 with 5.2 x 4.5 x 3.6 cm proximal sigmoid mass causnig colonic obstriction with singificant distention of cecum (7cm in diameter),  ascending colon and proximal transverse colon. No pneumatosis or pneumoperitoneum. Also small volume of ascites with associated findings concerning for peritoneal carcinomatosis, increased from prior studies. Dr. Dudley was consulted and recommended no surgical intervention.    He was then seen in clinic on 1/29/2018 due to inability to tolerate solid foods and he was getting abdominal cramping and some nausea. He also noticed that his ostomy output decreased. He was given IV fluids as well as antibiotics and was started on OxyContin 10 mg twice a day along with p.r.n. oxycodone. Of note a few weeks prior he was started on short acting octreotide to decrease the ostomy output but he stopped taking it due to worsening of the abdominal cramping. A colonic stent was placed on 2/6/18.    He was recently hospitalized from 2/20-2/25/18 with presumed infectious colitis and bacteremia. His port was removed. He was discharged home on IV vancomycin. He comes in today for routine follow up.    Interim history  Patient reports that overall he has been doing much better since hospital discharge.  He is concerned that he has noticed his OxyContin tablets several times in his ileostomy bag.  He is able to identify it as he can still see the #10 on the tablet.  He is unsure if there is any correlation with having increased abdominal pain around the times when he notices the tablet come out and he is unsure how long after swallowing the tablet he sees it in the ostomy bag.  He is currently managing his pain with 20 mg of OxyContin twice a day and 10 mg of oxycodone 4 times a day.  He reports that his appetite has been variable.  He has been getting in between 3045-3921 anel a day orally.  He also is on TPN for 12 hours a day.  He reports that his stool consistency varies considerably depending on what he eats though it was a bit firmer while he was in the hospital.  He is drinking about half a gallon of fluids a day.  He does note  some improvement in his energy.  Following his hospitalization, he was up at night frequently to urinate.  This is starting to improve.  He has been doing the vancomycin at home and this is going okay.  He denies any other concerns.    ROS:  Patient denies any of the following except if noted above: fevers, chills, vision or hearing changes, chest pain, dyspnea, nausea, vomiting, diarrhea, constipation, urinary concerns, headaches, numbness, tingling, issues with sleep or mood.    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      HERNIA REPAIR       INSERT PORT VASCULAR ACCESS Right 8/10/2017    Procedure: INSERT PORT VASCULAR ACCESS;  Single Lumen Right Chest Power Port;  Surgeon: Malena Andrew PA-C;  Location: [facility]     LAPAROSCOPY DIAGNOSTIC (GENERAL) N/A 12/6/2017    Procedure: LAPAROSCOPY DIAGNOSTIC (GENERAL);  Diagnostic Laparoscopy, Exploratory Laparotomy Anesthesia Block ;  Surgeon: Rob Dudley MD;  Location: [facility]     LAPAROTOMY EXPLORATORY N/A 12/6/2017    Procedure: LAPAROTOMY EXPLORATORY;;  Surgeon: Rob Dudley MD;  Location:  OR      ORTHOPEDIC SURGERY Left     HIP ARTHROPLASTY     PICC INSERTION Right 02/23/2018    5Fr DL BioFlo PICC, 44cm (3cm external) in the R basilic vein w/ tip in the SVC RA junction      Left hip replacement.     Allergies:  Allergies as of 03/01/2018 - Julius as Reviewed 03/01/2018   Allergen Reaction Noted     Liquid adhesive Rash 03/01/2018     Current Medications:  Current Outpatient Prescriptions   Medication Sig Dispense Refill     OXYCODONE HCL PO Take 5 mg by mouth every 24 hours       vancomycin 1,500 mg Inject 1,500 mg into the vein every 12 hours for 14 days  0     parenteral nutrition - PTA/DISCHARGE ORDER Inject into the vein daily See medication history note from 2/20/18 for most recent TPN formula.       oxyCODONE (OXYCONTIN) 10 MG 12 hr tablet Take 2 tablets (20 mg) by mouth every 12 hours 120 tablet 0     ondansetron (ZOFRAN-ODT) 8 MG ODT tab Take 1 tablet (8 mg) by mouth every 8 hours 30 tablet 1     LORazepam (ATIVAN) 0.5 MG tablet Take 1 tablet (0.5 mg) by mouth every 4 hours as needed (Anxiety, Nausea/Vomiting or Sleep) 30 tablet 5     Ferrous Sulfate (IRON SUPPLEMENT PO) Take 325 mg by mouth 2 times daily (with meals)        omeprazole (PRILOSEC) 20 MG CR capsule Take 1 capsule (20 mg) by mouth daily (Patient not taking: Reported on 3/1/2018) 30 capsule 11     opium tincture tincture Take 0.6 mLs (6 mg) by mouth 4 times daily (Patient not taking: Reported on 3/1/2018) 72 mL 0     diphenoxylate-atropine (LOMOTIL) 2.5-0.025 MG per tablet Take 1-2 tablets by mouth 4 times daily as needed for diarrhea (Patient not taking: Reported on 3/1/2018) 240 tablet 5     amylase-lipase-protease (CREON) 16045 UNITS CPEP Take 2 capsules (72,000 Units) by mouth 3 times daily (with meals) And 1 capsule with each snack tid. (Patient not taking: Reported on 2/20/2018) 240 capsule 3     Psyllium (METAMUCIL FIBER) 51.7 % PACK Take 1 packet by mouth 3 times daily (Patient not taking: Reported on 3/1/2018) 90 each 3      Physical Exam:  General: The patient is a pleasant male in no acute distress.  /72  Pulse 106  Temp 98.4  F (36.9  C)  Resp 16  Wt 68.5 kg (151 lb 1.6 oz)  SpO2 97%  BMI 22.31 kg/m2  Wt Readings from Last 10 Encounters:   03/01/18 68.5 kg (151 lb 1.6 oz)   02/25/18 69.1 kg (152 lb 6.4 oz)   02/15/18 61.6 kg (135 lb 11.2 oz)   02/09/18 64.1 kg (141 lb 4.8 oz)   02/05/18 66.5 kg (146 lb 11.2 oz)   02/01/18 64.9 kg (143 lb)   01/31/18 65.3 kg (143 lb 14.4 oz)   01/29/18 64.5 kg (142 lb 3.2 oz)   01/26/18 66.8 kg (147 lb 4.3 oz)   01/25/18 67.1 kg (148 lb)   HEENT: EOMI, PERRL. Sclerae are anicteric. Oral mucosa is pink and moist with no lesions or thrush.   Lymph: Neck is supple with no lymphadenopathy in the cervical or supraclavicular areas.   Heart: Regular rate and rhythm.   Lungs: Clear to auscultation bilaterally.   Abdomen: Bowel sounds present, soft, mild central abdominal tenderness with central firmness, ostomy bag is in right abdomen with liquid brown-green stool.  Extremities: No lower extremity edema noted bilaterally.   Neuro: Cranial nerves II through XII are grossly intact.  Skin: Right chest wall at previous port site with mild erythema and dry, flaky skin. PICC line is in place in right arm.     Laboratory/Imaging Studies   3/1/2018 07:18   Sodium 136   Potassium 4.9   Chloride 106   Carbon Dioxide 22   Urea Nitrogen 22   Creatinine 0.70   GFR Estimate >90   GFR Estimate If Black >90   Calcium 8.8   Anion Gap 8   Magnesium 1.8   Phosphorus 4.2   Albumin 2.6 (L)   Protein Total 7.6   Bilirubin Total 0.2   Alkaline Phosphatase 142   ALT 33   AST 19   Glucose 100 (H)   WBC 11.8 (H)   Hemoglobin 8.5 (L)   Hematocrit 27.1 (L)   Platelet Count 577 (H)   RBC Count 3.02 (L)   MCV 90   MCH 28.1   MCHC 31.4 (L)   RDW 18.0 (H)   Diff Method Automated Method   % Neutrophils 62.4   % Lymphocytes 16.2   % Monocytes 10.5   % Eosinophils 8.8   % Basophils 0.8   % Immature Granulocytes 1.3   Nucleated  RBCs 0   Absolute Neutrophil 7.3   Absolute Lymphocytes 1.9   Absolute Monocytes 1.2   Absolute Eosinophils 1.0 (H)   Absolute Basophils 0.1   Abs Immature Granulocytes 0.2   Absolute Nucleated RBC 0.0     NGS PANEL COLORECTAL    No mutations were identified in analyzed regions of KRAS, NRAS, BRAF, HRAS, or PIK3CA.       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  The sigmoid carcinoma is obstructing, requiring exploratory laparotomy and end ileostomy along with small bowel resection.    MSI intact and NGS panel does not reveal any identifiable mutations     He has been started on FOLFOX palliative chemotherapy. After 6 cycles, he had stable disease    On 12/6/17, he had Diagnostic laparoscopy and Exploratory laparotomy, but HIPEC was not performed as Peritoneal cancer index was 26 with diffuse involvement of the small bowel as well as the small bowel mesentery.     Repeat CT scan showed worsening peritoneal carcinomatosis and he is getting more symptomatic in terms of worsening abdominal pain and difficulty eating because of worsening abdominal cramping. He was admitted to the hospital with worsening colon distention and colon stent was placed which improved his symptoms. Now he is doing much better.     Plan was to resume FOLFOX, but then he was hospitalized with infectious colitis and bacteremia. He is recovering well from this and will see Dr. Long next week prior to consideration of resuming FOLFOX at that time. Because of previous neuropathy, plan is to decrease the dose of oxaliplatin to 70 mg/m . We may consider adding Avatin or anti-EGFR therapy in the future as well. Will plan to give him 4-6 cycles of FOLFOX before repeat imaging.    Abdominal pain due to peritoneal carcinomatosis and colon obstruction. This is much better after placement of colonic stent. Despite increasing the dose of OxyContin to 20 mg bid, he continues to use about eight 5 mg tablets of  "oxycodone/day. He is concerned about absorption of the OxyContin, as he has seen several of the tablets in his ostomy bag. I called the pharmacy and the  regarding this. Based on the available information, it is unclear if he is getting the full absorption of the medication with having an ileostomy. There is not good data regarding \"ghost tablets\" and OxyContin to know if what is being seen is simply just the remaining Matrix. I discussed with the patient the potential of switching to Fentanyl or liquid methadone. He would like to hold off for now and continue with his current regimen.     Bacteremia. Patient will complete his course of vancomycin on 3/7/18 with home infusion. He prefers to keep the PICC line, rather than get another port. We discussed a PICC line would be okay for administration of his chemotherapy.    FEN. Patient remains on 12 hours TPN/day and is eating as tolerated. His weight has declined since his hospitalization, but this seems more related to water weight after receiving a lot of IV fluids. His albumin remains low, though mildly improved from last check. Encouraged continuing to push protein in his diet. He has been following with a dietician as well.     Dermatitis. Secondary to previous Dermabond at previous port site. Discussed okay to apply hydrocortisone cream to the area, as no open areas remain at site.     History of iron deficient anemia. Patient received IV iron in the August 2018. Hemoglobin has trended down again recently, though MCV appears okay. Will plan to recheck iron studies next week.     Linda Alves PA-C  [facility] [address] [id number] [phone]                 "

## 2018-03-01 NOTE — NURSING NOTE
"Oncology Rooming Note    March 1, 2018 7:37 AM   Sebas Lopez is a 52 year old male who presents for:    Chief Complaint   Patient presents with     Labs Only     drawn form PICC,saline flushed  vitals checked     Oncology Clinic Visit     Return vsiit related to Colon Cancer     Initial Vitals: /72  Pulse 106  Temp 98.4  F (36.9  C)  Resp 16  Wt 68.5 kg (151 lb 1.6 oz)  SpO2 97%  BMI 22.31 kg/m2 Estimated body mass index is 22.31 kg/(m^2) as calculated from the following:    Height as of 2/20/18: 1.753 m (5' 9\").    Weight as of this encounter: 68.5 kg (151 lb 1.6 oz). Body surface area is 1.83 meters squared.  No Pain (0) Comment: Data Unavailable   No LMP for male patient.  Allergies reviewed: Yes  Medications reviewed: Yes    Medications: Medication refills not needed today.  Pharmacy name entered into [facility]: [facility] - [place], [place] - [address] [place] [address]    Clinical concerns: No new concerns. Provider was notified.    10 minutes for nursing intake (face to face time)     Gina Gutierrez LPN            "

## 2018-03-01 NOTE — PROGRESS NOTES
Oncology follow up visit:  Date on this visit: Mar 1, 2018    CC  sigmoid adenocarcinoma with peritoneal carcinomatosis    Primary Physician: Waylon Han     History Of Present Illness:     Please see previous note for details. I have copied and updated from prior notes.   Sebas is a 52-year-old male who has a history of ulcerative colitis diagnosed more than 20 years ago, but he has not had medical management for that.  He was doing well, but in 05/2017 he presented with a few weeks of abdominal pain.  At that time, his imaging showed that he had a sigmoid wall thickening with adjacent mesenteric lymphadenopathy and free fluid near the abdominal wall mesh from his prior hernia surgery.  He subsequently underwent a colonoscopy which was incomplete and showed an obstructing sigmoid colon mass.  The biopsy from the sigmoid mass showed sigmoid adenocarcinoma.  He then developed obstructive symptoms and underwent exploratory laparotomy on 07/10/2017.  During that he was found to have diffuse peritoneal carcinomatosis.  Extensive lysis of adhesions was performed and a small bowel resection was performed with end ileostomy.  The biopsies from the multiple large peritoneal metastasis also were positive for metastatic adenocarcinoma. We still haven't received testing on MSI or NGS panel back     He started palliative FOLFOX on 8/11/17. C#6 given on 10/26/17  After 6 cycles, he has stable disease    On 12/6/17, he had Diagnostic laparoscopy and Exploratory laparotomy, but HIPEC was not performed as Peritoneal cancer index was 26 with diffuse involvement of the small bowel as well as the small bowel mesentery.     He then presented to ED on 1/26 with abdominal pain and associated nausea. CT A/P on 1/26 with 5.2 x 4.5 x 3.6 cm proximal sigmoid mass causnig colonic obstriction with singificant distention of cecum (7cm in diameter), ascending colon and proximal transverse colon. No pneumatosis or pneumoperitoneum. Also  small volume of ascites with associated findings concerning for peritoneal carcinomatosis, increased from prior studies. Dr. Dudley was consulted and recommended no surgical intervention.    He was then seen in clinic on 1/29/2018 due to inability to tolerate solid foods and he was getting abdominal cramping and some nausea. He also noticed that his ostomy output decreased. He was given IV fluids as well as antibiotics and was started on OxyContin 10 mg twice a day along with p.r.n. oxycodone. Of note a few weeks prior he was started on short acting octreotide to decrease the ostomy output but he stopped taking it due to worsening of the abdominal cramping. A colonic stent was placed on 2/6/18.    He was recently hospitalized from 2/20-2/25/18 with presumed infectious colitis and bacteremia. His port was removed. He was discharged home on IV vancomycin. He comes in today for routine follow up.    Interim history  Patient reports that overall he has been doing much better since hospital discharge.  He is concerned that he has noticed his OxyContin tablets several times in his ileostomy bag.  He is able to identify it as he can still see the #10 on the tablet.  He is unsure if there is any correlation with having increased abdominal pain around the times when he notices the tablet come out and he is unsure how long after swallowing the tablet he sees it in the ostomy bag.  He is currently managing his pain with 20 mg of OxyContin twice a day and 10 mg of oxycodone 4 times a day.  He reports that his appetite has been variable.  He has been getting in between 0612-2434 anel a day orally.  He also is on TPN for 12 hours a day.  He reports that his stool consistency varies considerably depending on what he eats though it was a bit firmer while he was in the hospital.  He is drinking about half a gallon of fluids a day.  He does note some improvement in his energy.  Following his hospitalization, he was up at night  frequently to urinate.  This is starting to improve.  He has been doing the vancomycin at home and this is going okay.  He denies any other concerns.    ROS:  Patient denies any of the following except if noted above: fevers, chills, vision or hearing changes, chest pain, dyspnea, nausea, vomiting, diarrhea, constipation, urinary concerns, headaches, numbness, tingling, issues with sleep or mood.    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      HERNIA REPAIR       INSERT PORT VASCULAR ACCESS Right 8/10/2017    Procedure: INSERT PORT VASCULAR ACCESS;  Single Lumen Right Chest Power Port;  Surgeon: Malena Andrew PA-C;  [facility]     LAPAROSCOPY DIAGNOSTIC (GENERAL) N/A 12/6/2017    Procedure: LAPAROSCOPY DIAGNOSTIC (GENERAL);  Diagnostic Laparoscopy, Exploratory Laparotomy Anesthesia Block ;  Surgeon: Rob Dudley MD;  Location: [facility]     LAPAROTOMY EXPLORATORY N/A 12/6/2017    Procedure: LAPAROTOMY EXPLORATORY;;  Surgeon: Rob Dudley MD;  Location: [facility]     ORTHOPEDIC SURGERY Left     HIP ARTHROPLASTY     PICC INSERTION Right 02/23/2018    5Fr  DL BioFlo PICC, 44cm (3cm external) in the R basilic vein w/ tip in the SVC RA junction      Left hip replacement.     Allergies:  Allergies as of 03/01/2018 - Julius as Reviewed 03/01/2018   Allergen Reaction Noted     Liquid adhesive Rash 03/01/2018     Current Medications:  Current Outpatient Prescriptions   Medication Sig Dispense Refill     OXYCODONE HCL PO Take 5 mg by mouth every 24 hours       vancomycin 1,500 mg Inject 1,500 mg into the vein every 12 hours for 14 days  0     parenteral nutrition - PTA/DISCHARGE ORDER Inject into the vein daily See medication history note from 2/20/18 for most recent TPN formula.       oxyCODONE (OXYCONTIN) 10 MG 12 hr tablet Take 2 tablets (20 mg) by mouth every 12 hours 120 tablet 0     ondansetron (ZOFRAN-ODT) 8 MG ODT tab Take 1 tablet (8 mg) by mouth every 8 hours 30 tablet 1     LORazepam (ATIVAN) 0.5 MG tablet Take 1 tablet (0.5 mg) by mouth every 4 hours as needed (Anxiety, Nausea/Vomiting or Sleep) 30 tablet 5     Ferrous Sulfate (IRON SUPPLEMENT PO) Take 325 mg by mouth 2 times daily (with meals)        omeprazole (PRILOSEC) 20 MG CR capsule Take 1 capsule (20 mg) by mouth daily (Patient not taking: Reported on 3/1/2018) 30 capsule 11     opium tincture tincture Take 0.6 mLs (6 mg) by mouth 4 times daily (Patient not taking: Reported on 3/1/2018) 72 mL 0     diphenoxylate-atropine (LOMOTIL) 2.5-0.025 MG per tablet Take 1-2 tablets by mouth 4 times daily as needed for diarrhea (Patient not taking: Reported on 3/1/2018) 240 tablet 5     amylase-lipase-protease (CREON) 58570 UNITS CPEP Take 2 capsules (72,000 Units) by mouth 3 times daily (with meals) And 1 capsule with each snack tid. (Patient not taking: Reported on 2/20/2018) 240 capsule 3     Psyllium (METAMUCIL FIBER) 51.7 % PACK Take 1 packet by mouth 3 times daily (Patient not taking: Reported on 3/1/2018) 90 each 3     Physical Exam:  General: The patient is a pleasant male in no acute distress.  /72   Pulse 106  Temp 98.4  F (36.9  C)  Resp 16  Wt 68.5 kg (151 lb 1.6 oz)  SpO2 97%  BMI 22.31 kg/m2  Wt Readings from Last 10 Encounters:   03/01/18 68.5 kg (151 lb 1.6 oz)   02/25/18 69.1 kg (152 lb 6.4 oz)   02/15/18 61.6 kg (135 lb 11.2 oz)   02/09/18 64.1 kg (141 lb 4.8 oz)   02/05/18 66.5 kg (146 lb 11.2 oz)   02/01/18 64.9 kg (143 lb)   01/31/18 65.3 kg (143 lb 14.4 oz)   01/29/18 64.5 kg (142 lb 3.2 oz)   01/26/18 66.8 kg (147 lb 4.3 oz)   01/25/18 67.1 kg (148 lb)   HEENT: EOMI, PERRL. Sclerae are anicteric. Oral mucosa is pink and moist with no lesions or thrush.   Lymph: Neck is supple with no lymphadenopathy in the cervical or supraclavicular areas.   Heart: Regular rate and rhythm.   Lungs: Clear to auscultation bilaterally.   Abdomen: Bowel sounds present, soft, mild central abdominal tenderness with central firmness, ostomy bag is in right abdomen with liquid brown-green stool.  Extremities: No lower extremity edema noted bilaterally.   Neuro: Cranial nerves II through XII are grossly intact.  Skin: Right chest wall at previous port site with mild erythema and dry, flaky skin. PICC line is in place in right arm.     Laboratory/Imaging Studies   3/1/2018 07:18   Sodium 136   Potassium 4.9   Chloride 106   Carbon Dioxide 22   Urea Nitrogen 22   Creatinine 0.70   GFR Estimate >90   GFR Estimate If Black >90   Calcium 8.8   Anion Gap 8   Magnesium 1.8   Phosphorus 4.2   Albumin 2.6 (L)   Protein Total 7.6   Bilirubin Total 0.2   Alkaline Phosphatase 142   ALT 33   AST 19   Glucose 100 (H)   WBC 11.8 (H)   Hemoglobin 8.5 (L)   Hematocrit 27.1 (L)   Platelet Count 577 (H)   RBC Count 3.02 (L)   MCV 90   MCH 28.1   MCHC 31.4 (L)   RDW 18.0 (H)   Diff Method Automated Method   % Neutrophils 62.4   % Lymphocytes 16.2   % Monocytes 10.5   % Eosinophils 8.8   % Basophils 0.8   % Immature Granulocytes 1.3   Nucleated RBCs 0   Absolute Neutrophil 7.3   Absolute Lymphocytes 1.9   Absolute Monocytes 1.2   Absolute  Eosinophils 1.0 (H)   Absolute Basophils 0.1   Abs Immature Granulocytes 0.2   Absolute Nucleated RBC 0.0     NGS PANEL COLORECTAL    No mutations were identified in analyzed regions of KRAS, NRAS, BRAF, HRAS, or PIK3CA.       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  The sigmoid carcinoma is obstructing, requiring exploratory laparotomy and end ileostomy along with small bowel resection.    MSI intact and NGS panel does not reveal any identifiable mutations     He has been started on FOLFOX palliative chemotherapy. After 6 cycles, he had stable disease    On 12/6/17, he had Diagnostic laparoscopy and Exploratory laparotomy, but HIPEC was not performed as Peritoneal cancer index was 26 with diffuse involvement of the small bowel as well as the small bowel mesentery.     Repeat CT scan showed worsening peritoneal carcinomatosis and he is getting more symptomatic in terms of worsening abdominal pain and difficulty eating because of worsening abdominal cramping. He was admitted to the hospital with worsening colon distention and colon stent was placed which improved his symptoms. Now he is doing much better.     Plan was to resume FOLFOX, but then he was hospitalized with infectious colitis and bacteremia. He is recovering well from this and will see Dr. Long next week prior to consideration of resuming FOLFOX at that time. Because of previous neuropathy, plan is to decrease the dose of oxaliplatin to 70 mg/m . We may consider adding Avatin or anti-EGFR therapy in the future as well. Will plan to give him 4-6 cycles of FOLFOX before repeat imaging.    Abdominal pain due to peritoneal carcinomatosis and colon obstruction. This is much better after placement of colonic stent. Despite increasing the dose of OxyContin to 20 mg bid, he continues to use about eight 5 mg tablets of oxycodone/day. He is concerned about absorption of the OxyContin, as he has seen several of the  "tablets in his ostomy bag. I called the pharmacy and the  regarding this. Based on the available information, it is unclear if he is getting the full absorption of the medication with having an ileostomy. There is not good data regarding \"ghost tablets\" and OxyContin to know if what is being seen is simply just the remaining Matrix. I discussed with the patient the potential of switching to Fentanyl or liquid methadone. He would like to hold off for now and continue with his current regimen.     Bacteremia. Patient will complete his course of vancomycin on 3/7/18 with home infusion. He prefers to keep the PICC line, rather than get another port. We discussed a PICC line would be okay for administration of his chemotherapy.    FEN. Patient remains on 12 hours TPN/day and is eating as tolerated. His weight has declined since his hospitalization, but this seems more related to water weight after receiving a lot of IV fluids. His albumin remains low, though mildly improved from last check. Encouraged continuing to push protein in his diet. He has been following with a dietician as well.     Dermatitis. Secondary to previous Dermabond at previous port site. Discussed okay to apply hydrocortisone cream to the area, as no open areas remain at site.     History of iron deficient anemia. Patient received IV iron in the August 2018. Hemoglobin has trended down again recently, though MCV appears okay. Will plan to recheck iron studies next week.     Linda Alves PA-C  [facility]  [address], [place] [id number] [phone]             "

## 2018-03-01 NOTE — NURSING NOTE
Chief Complaint   Patient presents with     Labs Only     drawn form PICC,saline flushed  vitals checked

## 2018-03-01 NOTE — MR AVS SNAPSHOT
After Visit Summary   3/1/2018    Sebas Lopez    MRN: [id number]           Patient Information     Date Of Birth          1965        Visit Information        Provider Department      3/1/2018 7:50 AM Linda Alves PA-C [facility]        Today's Diagnoses     Cancer of sigmoid colon (H)    -  1    Iron deficiency anemia due to chronic blood loss           Follow-ups after your visit        Your next 10 appointments already scheduled     Mar 01, 2018 10:30 AM CST   Infusion 240 with [facility], [facility] ATC   [facility] ([facility])    [address] [place] [address]  [address]  [facility] [id number] [phone]            Mar 29, 2018  4:15 PM CDT   (Arrive by 4:00 PM)   Return Visit with Sukhdeep Mota MD   [facility] ([facility])    [address] [place] [address]  [address]  [facility] [id number] [phone]              Future tests that were ordered for you today     Open Standing Orders        Priority Remaining Interval Expires Ordered    Retention sutures Routine 90170/56763 PRN  3/1/2018          Open Future Orders        Priority Expected Expires Ordered    Ferritin Routine 3/8/2018 3/22/2018 3/1/2018    Iron and iron binding capacity Routine 3/8/2018 3/22/2018 3/1/2018            Who to contact     If you have questions or need follow up information about today's clinic visit or your schedule please contact [facility] directly at [phone].  Normal or non-critical lab and imaging results will be communicated to you by MyChart, letter or phone within 4 business days after the clinic has received the results. If you do not hear from us within 7 days, please contact the clinic through [employer] or phone. If you have a critical or abnormal lab result, we will notify you by phone as soon as possible.  Submit refill requests through [employer] or call  your pharmacy and they will forward the refill request to us. Please allow 3 business days for your refill to be completed.          Additional Information About Your Visit        MyChart Information     [employer] gives you secure access to your electronic health record. If you see a primary care provider, you can also send messages to your care team and make appointments. If you have questions, please call your primary care clinic.  If you do not have a primary care provider, please call [phone] and they will assist you.        Care EveryWhere ID     This is your Care EveryWhere ID. This could be used by other organizations to access your [place] medical records  [id number]        Your Vitals Were     Pulse Temperature Respirations Pulse Oximetry BMI (Body Mass Index)       106 98.4  F (36.9  C) 16 97% 22.31 kg/m2        Blood Pressure from Last 3 Encounters:   03/01/18 116/72   02/25/18 113/70   02/15/18 122/65    Weight from Last 3 Encounters:   03/01/18 68.5 kg (151 lb 1.6 oz)   02/25/18 69.1 kg (152 lb 6.4 oz)   02/15/18 61.6 kg (135 lb 11.2 oz)              We Performed the Following     CBC with platelets differential     Comprehensive metabolic panel     Magnesium     Phosphorus     Vancomycin level        Primary Care Provider Office Phone # Fax #    Jaguar Becker [phone]       [place] [address] [facility] [id number]        Equal Access to Services     [facility]: Hadii aad ku hadasho Soshu, waaxda luqadaha, qaybta kaalmada lewis, bhargav sage . So [facility] [phone].    ATENCIÓN: Si habla español, tiene a cid disposición servicios gratuitos de asistencia lingüística. Llame al [phone].    We comply with applicable federal civil rights laws and [place] laws. We do not discriminate on the basis of race, color, national origin, age, disability, sex, sexual orientation, or gender identity.            Thank you!     Thank you for  [facility]  for your care. Our goal is always to provide you with excellent care. Hearing back from our patients is one way we can continue to improve our services. Please take a few minutes to complete the written survey that you may receive in the mail after your visit with us. Thank you!             Your Updated Medication List - Protect others around you: Learn how to safely use, store and throw away your medicines at www.disposemymeds.org.          This list is accurate as of 3/1/18  9:26 AM.  Always use your most recent med list.                   Brand Name Dispense Instructions for use Diagnosis    amylase-lipase-protease 41250 UNITS Cpep    CREON    240 capsule    Take 2 capsules (72,000 Units) by mouth 3 times daily (with meals) And 1 capsule with each snack tid.    Diarrhea, unspecified type, Peritoneal carcinomatosis (H), Cancer of sigmoid colon (H)       diphenoxylate-atropine 2.5-0.025 MG per tablet    LOMOTIL    240 tablet    Take 1-2 tablets by mouth 4 times daily as needed for diarrhea    Diarrhea due to drug       IRON SUPPLEMENT PO      Take 325 mg by mouth 2 times daily (with meals)        LORazepam 0.5 MG tablet    ATIVAN    30 tablet    Take 1 tablet (0.5 mg) by mouth every 4 hours as needed (Anxiety, Nausea/Vomiting or Sleep)    Peritoneal carcinomatosis (H), Cancer of sigmoid colon (H)       omeprazole 20 MG CR capsule    priLOSEC    30 capsule    Take 1 capsule (20 mg) by mouth daily    Diarrhea, unspecified type       ondansetron 8 MG ODT tab    ZOFRAN-ODT    30 tablet    Take 1 tablet (8 mg) by mouth every 8 hours    Nausea       opium tincture tincture     72 mL    Take 0.6 mLs (6 mg) by mouth 4 times daily    Diarrhea, unspecified type, Cancer of sigmoid colon (H), Peritoneal carcinomatosis (H)       * OXYCODONE HCL PO      Take 5 mg by mouth every 24 hours        * oxyCODONE 10 MG 12 hr tablet    OXYCONTIN    120 tablet    Take 2 tablets (20 mg) by mouth  every 12 hours    Peritoneal carcinomatosis (H), Cancer of sigmoid colon (H)       parenteral nutrition - PTA/DISCHARGE ORDER      Inject into the vein daily See medication history note from 2/20/18 for most recent TPN formula.        Psyllium 51.7 % Pack    METAMUCIL FIBER    90 each    Take 1 packet by mouth 3 times daily    Diarrhea, unspecified type       vancomycin 1,500 mg      Inject 1,500 mg into the vein every 12 hours for 14 days    Bacteremia       * Notice:  This list has 2 medication(s) that are the same as other medications prescribed for you. Read the directions carefully, and ask your doctor or other care provider to review them with you.

## 2018-03-02 NOTE — PROGRESS NOTES
This is a recent snapshot of the patient's [facility] medical record.  For current drug dose and complete information and questions, call [phone] or In Basket pool, [facility]v home infusion ([id number])  CSN Number:  [id number]

## 2018-03-08 NOTE — PROGRESS NOTES
Infusion Nursing Note:  Sebas Lopez presents today for C7D1 Oxaliplatin, Leucovorin, Fluorouracil injection/pump.    Patient seen by provider today: Yes: Albert Long MD   present during visit today: Not Applicable.    Note: Patient feels well. No complaints made    Intravenous Access:  PICC.    Treatment Conditions:  Lab Results   Component Value Date    HGB 8.8 03/08/2018     Lab Results   Component Value Date    WBC 8.9 03/08/2018      Lab Results   Component Value Date    ANEU 5.5 03/08/2018     Lab Results   Component Value Date     03/08/2018      Lab Results   Component Value Date     03/08/2018                   Lab Results   Component Value Date    POTASSIUM 4.2 03/08/2018           Lab Results   Component Value Date    MAG 1.9 03/08/2018            Lab Results   Component Value Date    CR 0.77 03/08/2018                   Lab Results   Component Value Date    ANNAMARIE 9.3 03/08/2018                Lab Results   Component Value Date    BILITOTAL 0.3 03/08/2018           Lab Results   Component Value Date    ALBUMIN 3.0 03/08/2018                    Lab Results   Component Value Date    ALT 40 03/08/2018           Lab Results   Component Value Date    AST 30 03/08/2018       Results reviewed, labs MET treatment parameters, ok to proceed with treatment.      TORB:3/8/18/1454H/ Albert Long MD/Michelle Solorzano RN/ He does not need Sandostatin today.      Infusion:  Continous Infusion of Fluorouracil at 5 ml/hour for 46 hours, double checked with  Barak Osman RN.    Post Infusion Assessment:    Results reviewed, copy given to patient.  Proceed with treatment.    Prior to discharge: Port is secured in place with tegaderm and flushed with 10cc NS with positive blood return noted.  Continuous home infusion Dosi-Fuser pump connected.    All connectors secured in place and clamps taped open and checked by Tarah Haddad RN  Patient instructed to call our clinic or [facility] with  any questions or concerns at home.  Patient verbalized understanding.    Patient set up for pump disconnect at [facility] on 3/10/18@1400H.         Post Infusion Assessment:  Patient tolerated infusion without incident.  Patient tolerated injection without incident.  Blood return noted pre and post infusion.  Blood return noted during administration every 2 cc.  Site patent and intact, free from redness, edema or discomfort.  No evidence of extravasations.    Discharge Plan:   Patient declined prescription refills.  Discharge instructions reviewed with: Patient and Family.  Patient and/or family verbalized understanding of discharge instructions and all questions answered.  Copy of AVS reviewed with patient and/or family.  Patient will return 3/22/18 for next appointment.  Patient discharged in stable condition accompanied by: mother.  Departure Mode: Ambulatory.    DILMA GODINEZ RN

## 2018-03-08 NOTE — MR AVS SNAPSHOT
After Visit Summary   3/8/2018    Sebas Lopez    MRN: [id number]           Patient Information     Date Of Birth          1965        Visit Information        Provider Department      3/8/2018 1:30 PM  32 ATC;  ONCOLOGY INFUSION [facility]        Today's Diagnoses     Peritoneal carcinomatosis (H)    -  1    Cancer of sigmoid colon (H)        Iron deficiency anemia due to chronic blood loss          Care Instructions    Contact Numbers  [facility]: [phone]    After Hours:  [phone]  Triage: [phone]    Please call the [facility] Triage line if you experience a temperature greater than or equal to 100.5, shaking chills, have uncontrolled nausea, vomiting and/or diarrhea, dizziness, shortness of breath, chest pain, bleeding, unexplained bruising, or if you have any other new/concerning symptoms, questions or concerns.     If it is after hours, weekends, or holidays, please call the main hospital  at  [phone] and ask to speak to the Oncology doctor on call.     If you are having any concerning symptoms or wish to speak to a provider before your next infusion visit, please call your care coordinator or triage to notify them so we can adequately serve you.     If you need a refill on a narcotic prescription or other medication, please call triage before your infusion appointment.         March 2018 Sunday Monday Tuesday Wednesday Thursday Friday Saturday                       1     [facility]    7:15 AM   (15 min.)    [facility] DRAW   [facility] Lab Draw     [facility] RETURN    7:35 AM   (50 min.)   Linda Alves PA-C   [facility] ONC INFUSION 240   10:30 AM   (240 min.)    ONCOLOGY INFUSION   [facility] 2     3       4     5     6     7     8     [facility]   12:30 PM   (15 min.)    [facility] DRAW   [facility] Lab Draw     [facility] RETURN   12:45 PM    (30 min.)   Albert Long MD   [facility]     [facility] ONC INFUSION 240    1:30 PM   (240 min.)   [facility] ONCOLOGY INFUSION   [facility] 9     10       11     12     13     14     15     16     17       18     19     20     21     22     [facility] D[facility]    6:30 AM   (15 min.)    [facility] DRAW   [facility] Lab Draw     [facility] RETURN    6:45 AM   (50 min.)   [facility]   [facility]    7:30 AM   (240 min.)   [facility] ONCOLOGY INFUSION   [facility] 23     24       25     26     27     28     29     [facility] RETURN    4:00 PM   (45 min.)   Sukhdeep Mota MD   [facility] 30 31 April 2018 Sunday Monday Tuesday Wednesday Thursday Friday Saturday   1     2     3     4     5     6     7       8     9     10     11     12     13     14       15     16     17     18     19     20     21       22     23     24     25     26     27     28       29     30                                           Lab Results:  Recent Results (from the past 12 hour(s))   Magnesium    Collection Time: 03/08/18 12:43 PM   Result Value Ref Range    Magnesium 1.9 1.6 - 2.3 mg/dL   Phosphorus    Collection Time: 03/08/18 12:43 PM   Result Value Ref Range    Phosphorus 4.6 (H) 2.5 - 4.5 mg/dL   CBC with platelets differential    Collection Time: 03/08/18 12:43 PM   Result Value Ref Range    WBC 8.9 4.0 - 11.0 10e9/L    RBC Count 3.16 (L) 4.4 - 5.9 10e12/L    Hemoglobin 8.8 (L) 13.3 - 17.7 g/dL    Hematocrit 28.5 (L) 40.0 - 53.0 %    MCV 90 78 - 100 fl    MCH 27.8 26.5 - 33.0 pg    MCHC 30.9 (L) 31.5 - 36.5 g/dL    RDW 17.5 (H) 10.0 - 15.0 %    Platelet Count 389 150 - 450 10e9/L    Diff Method Automated Method     % Neutrophils 61.7 %    % Lymphocytes 23.5 %    % Monocytes 8.9 %    % Eosinophils 5.0 %    % Basophils 0.7 %    % Immature Granulocytes 0.2 %    Nucleated RBCs 0 0 /100    Absolute  Neutrophil 5.5 1.6 - 8.3 10e9/L    Absolute Lymphocytes 2.1 0.8 - 5.3 10e9/L    Absolute Monocytes 0.8 0.0 - 1.3 10e9/L    Absolute Eosinophils 0.5 0.0 - 0.7 10e9/L    Absolute Basophils 0.1 0.0 - 0.2 10e9/L    Abs Immature Granulocytes 0.0 0 - 0.4 10e9/L    Absolute Nucleated RBC 0.0    Comprehensive metabolic panel    Collection Time: 03/08/18 12:43 PM   Result Value Ref Range    Sodium 137 133 - 144 mmol/L    Potassium 4.2 3.4 - 5.3 mmol/L    Chloride 105 94 - 109 mmol/L    Carbon Dioxide 25 20 - 32 mmol/L    Anion Gap 7 3 - 14 mmol/L    Glucose 94 70 - 99 mg/dL    Urea Nitrogen 27 7 - 30 mg/dL    Creatinine 0.77 0.66 - 1.25 mg/dL    GFR Estimate >90 >60 mL/min/1.7m2    GFR Estimate If Black >90 >60 mL/min/1.7m2    Calcium 9.3 8.5 - 10.1 mg/dL    Bilirubin Total 0.3 0.2 - 1.3 mg/dL    Albumin 3.0 (L) 3.4 - 5.0 g/dL    Protein Total 8.0 6.8 - 8.8 g/dL    Alkaline Phosphatase 163 (H) 40 - 150 U/L    ALT 40 0 - 70 U/L    AST 30 0 - 45 U/L   Iron and iron binding capacity    Collection Time: 03/08/18 12:43 PM   Result Value Ref Range    Iron 45 35 - 180 ug/dL    Iron Binding Cap 340 240 - 430 ug/dL    Iron Saturation Index 13 (L) 15 - 46 %   Ferritin    Collection Time: 03/08/18 12:43 PM   Result Value Ref Range    Ferritin 104 26 - 388 ng/mL               Follow-ups after your visit        Your next 10 appointments already scheduled     Mar 22, 2018  6:30 AM CDT   [facility] Draw with  [facility] DRAW   [facility] Lab Draw ([facility])    [address] [place] [id number] [phone]            Mar 22, 2018  7:00 AM CDT   (Arrive by 6:45 AM)   Return Visit with Linda Alves PA-C   [facility] ([facility])    [address]  [address]  [facility] [id number] [phone]            Mar 22, 2018  7:30 AM CDT   Infusion 240 with UC ONCOLOGY INFUSION, UC 13 ATC   [facility] (  [facility] [place])    [address]  [address]  [place] [id number] [phone]            Mar 29, 2018  4:15 PM CDT   (Arrive by 4:00 PM)   Return Visit with Sukhdeep Mota MD   [facility] ([facility])    [address]  [address]  [place] [facility] [id number] [phone]              Who to contact     If you have questions or need follow up information about today's clinic visit or your schedule please contact [facility] directly at [phone].  Normal or non-critical lab and imaging results will be communicated to you by MyChart, letter or phone within 4 business days after the clinic has received the results. If you do not hear from us within 7 days, please contact the clinic through [employer] or phone. If you have a critical or abnormal lab result, we will notify you by phone as soon as possible.  Submit refill requests through [employer] or call your pharmacy and they will forward the refill request to us. Please allow 3 business days for your refill to be completed.          Additional Information About Your Visit        [employer] Information     [employer] gives you secure access to your electronic health record. If you see a primary care provider, you can also send messages to your care team and make appointments. If you have questions, please call your primary care clinic.  If you do not have a primary care provider, please call [phone] and they will assist you.        Care EveryWhere ID     This is your Care EveryWhere ID. This could be used by other organizations to access your [place] medical records  [id number]         Blood Pressure from Last 3 Encounters:   03/08/18 120/78   03/01/18 116/72   02/25/18 113/70    Weight from Last 3 Encounters:   03/08/18 69.2 kg (152 lb 8 oz)   03/01/18 68.5 kg (151 lb 1.6 oz)   02/25/18 69.1 kg (152 lb 6.4 oz)              We Performed the Following     CBC with  platelets differential     Comprehensive metabolic panel     Ferritin     Iron and iron binding capacity     Magnesium     Phosphorus          Today's Medication Changes          These changes are accurate as of 3/8/18  3:41 PM.  If you have any questions, ask your nurse or doctor.               These medicines have changed or have updated prescriptions.        Dose/Directions    * OXYCODONE HCL PO   This may have changed:  Another medication with the same name was added. Make sure you understand how and when to take each.   Changed by:  Albert Long MD        Dose:  5 mg   Take 5 mg by mouth every 24 hours   Refills:  0       * oxyCODONE 10 MG 12 hr tablet   Commonly known as:  OXYCONTIN   This may have changed:  Another medication with the same name was added. Make sure you understand how and when to take each.   Used for:  Peritoneal carcinomatosis (H), Cancer of sigmoid colon (H)   Changed by:  Albert Long MD        Dose:  20 mg   Take 2 tablets (20 mg) by mouth every 12 hours   Quantity:  120 tablet   Refills:  0       * oxyCODONE 5 MG capsule   Commonly known as:  OXY-IR   This may have changed:  You were already taking a medication with the same name, and this prescription was added. Make sure you understand how and when to take each.   Used for:  Peritoneal carcinomatosis (H)   Changed by:  Albert Long MD        Dose:  5-10 mg   Take 1-2 capsules (5-10 mg) by mouth every 4 hours as needed for moderate to severe pain   Quantity:  100 capsule   Refills:  0       * Notice:  This list has 3 medication(s) that are the same as other medications prescribed for you. Read the directions carefully, and ask your doctor or other care provider to review them with you.         Where to get your medicines      Some of these will need a paper prescription and others can be bought over the counter.  Ask your nurse if you have questions.     Bring a paper prescription for each of these medications     oxyCODONE 5 MG  capsule                Primary Care Provider Office Phone # Fax #    Jaguar Becker [phone]       [place] [facility] [address]  [facility] [address]        Equal Access to Services     SARAH DUNN : Haddarrian karrie ku patrico Soyolandaali, waaxda luqadaha, qaybta kaalmada lewis, bhargav kearneybrice alba. So [facility] [phone].    ATENCIÓN: Si habla español, tiene a cid disposición servicios gratuitos de asistencia lingüística. Llame al [phone].    We comply with applicable federal civil rights laws and [place] laws. We do not discriminate on the basis of race, color, national origin, age, disability, sex, sexual orientation, or gender identity.            Thank you!     Thank you for choosing [facility]  for your care. Our goal is always to provide you with excellent care. Hearing back from our patients is one way we can continue to improve our services. Please take a few minutes to complete the written survey that you may receive in the mail after your visit with us. Thank you!             Your Updated Medication List - Protect others around you: Learn how to safely use, store and throw away your medicines at www.disposemymeds.org.          This list is accurate as of 3/8/18  3:41 PM.  Always use your most recent med list.                   Brand Name Dispense Instructions for use Diagnosis    amylase-lipase-protease 10676 UNITS Cpep    CREON    240 capsule    Take 2 capsules (72,000 Units) by mouth 3 times daily (with meals) And 1 capsule with each snack tid.    Diarrhea, unspecified type, Peritoneal carcinomatosis (H), Cancer of sigmoid colon (H)       diphenoxylate-atropine 2.5-0.025 MG per tablet    LOMOTIL    240 tablet    Take 1-2 tablets by mouth 4 times daily as needed for diarrhea    Diarrhea due to drug       IRON SUPPLEMENT PO      Take 325 mg by mouth 2 times daily (with meals)        LORazepam 0.5 MG tablet    ATIVAN    30 tablet    Take 1  tablet (0.5 mg) by mouth every 4 hours as needed (Anxiety, Nausea/Vomiting or Sleep)    Peritoneal carcinomatosis (H), Cancer of sigmoid colon (H)       omeprazole 20 MG CR capsule    priLOSEC    30 capsule    Take 1 capsule (20 mg) by mouth daily    Diarrhea, unspecified type       ondansetron 8 MG ODT tab    ZOFRAN-ODT    30 tablet    Take 1 tablet (8 mg) by mouth every 8 hours    Nausea       opium tincture tincture     72 mL    Take 0.6 mLs (6 mg) by mouth 4 times daily    Diarrhea, unspecified type, Cancer of sigmoid colon (H), Peritoneal carcinomatosis (H)       * OXYCODONE HCL PO      Take 5 mg by mouth every 24 hours        * oxyCODONE 10 MG 12 hr tablet    OXYCONTIN    120 tablet    Take 2 tablets (20 mg) by mouth every 12 hours    Peritoneal carcinomatosis (H), Cancer of sigmoid colon (H)       * oxyCODONE 5 MG capsule    OXY-IR    100 capsule    Take 1-2 capsules (5-10 mg) by mouth every 4 hours as needed for moderate to severe pain    Peritoneal carcinomatosis (H)       parenteral nutrition - PTA/DISCHARGE ORDER      Inject into the vein daily See medication history note from 2/20/18 for most recent TPN formula.        Psyllium 51.7 % Pack    METAMUCIL FIBER    90 each    Take 1 packet by mouth 3 times daily    Diarrhea, unspecified type       vancomycin 1,500 mg      Inject 1,500 mg into the vein every 12 hours for 14 days    Bacteremia       * Notice:  This list has 3 medication(s) that are the same as other medications prescribed for you. Read the directions carefully, and ask your doctor or other care provider to review them with you.

## 2018-03-08 NOTE — PROGRESS NOTES
Oncology follow up visit:  Date on this visit: 3/8/2018         CC  sigmoid adenocarcinoma with peritoneal carcinomatosis    Primary Physician: Waylon Han     History Of Present Illness:     Please see previous note for details. I have copied and updated from prior notes.   Sebas is a 52-year-old male who has a history of ulcerative colitis diagnosed more than 20 years ago, but he has not had medical management for that.  He was doing well, but in 05/2017 he presented with a few weeks of abdominal pain.  At that time, his imaging showed that he had a sigmoid wall thickening with adjacent mesenteric lymphadenopathy and free fluid near the abdominal wall mesh from his prior hernia surgery.  He subsequently underwent a colonoscopy which was incomplete and showed an obstructing sigmoid colon mass.  The biopsy from the sigmoid mass showed sigmoid adenocarcinoma.  He then developed obstructive symptoms and underwent exploratory laparotomy on 07/10/2017.  During that he was found to have diffuse peritoneal carcinomatosis.  Extensive lysis of adhesions was performed and a small bowel resection was performed with end ileostomy.  The biopsies from the multiple large peritoneal metastasis also were positive for metastatic adenocarcinoma. We still haven't received testing on MSI or NGS panel back     He started palliative FOLFOX on 8/11/17. C#6 given on 10/26/17  After 6 cycles, he has stable disease    On 12/6/17, he had Diagnostic laparoscopy and Exploratory laparotomy, but HIPEC was not performed as Peritoneal cancer index was 26 with diffuse involvement of the small bowel as well as the small bowel mesentery.     He then presented to ED on 1/26 with abdominal pain and associated nausea. CT A/P on 1/26 with 5.2 x 4.5 x 3.6 cm proximal sigmoid mass causnig colonic obstriction with singificant distention of cecum (7cm in diameter), ascending colon and proximal transverse colon. No pneumatosis or pneumoperitoneum. Also  small volume of ascites with associated findings concerning for peritoneal carcinomatosis, increased from prior studies. Dr. Dudley was consulted and recommended no surgical intervention.    He was then seen in clinic on 1/29/2018 as well as yesterday because for the last few days he was unable to tolerate solid foods and he was getting abdominal cramping and some nausea. He also noticed that his ostomy output decreased. He was given IV fluids as well as antibiotics and yesterday he was started on OxyContin 10 mg twice a day along with p.r.n. oxycodone. Of note a few weeks ago he was started on short acting octreotide to decrease the ostomy output but he stopped taking it about one week ago and few days prior to that he noticed worsening of the abdominal cramping.      Repeat CT scan showed worsening peritoneal carcinomatosis and he is getting more symptomatic in terms of worsening abdominal pain and difficulty eating because of worsening abdominal cramping. He was admitted to the hospital with worsening colon distention and colon stent was placed which improved his symptoms.     He was recently hospitalized from 2/20-2/25/18 with presumed infectious colitis and bacteremia. His port was removed. He was discharged home on IV vancomycin why a PICC line which he completed on 3/7/2018.     Interim history  He is doing better. He thinks his energy has improved. He is eating well and is usually consuming 1402-6729 anel a day. He continues to use TPN 12 hours a day. He has a PICC line which is working well. Port is out. Occasional nausea with is not very bothersome. No vomiting. He continues to have abdominal pain. Occasionally he has seen OxyContin pills in his ostomy bag and previously we had discussed with pharmacy that at this time we are not sure how much of the active medication is absorbed and how much matrix is they are in the pills which he is seen. On average she takes oxycodone on 6-8 tablets a day to control  his pain. Otherwise ostomy is working well. No bleeding. No fevers or further infections. No new swellings. Presently he does not have any neuropathy. He continues to take oral iron without problems.    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      HERNIA REPAIR       INSERT PORT VASCULAR ACCESS Right 8/10/2017    Procedure: INSERT PORT VASCULAR ACCESS;  Single Lumen Right Chest Power Port;  Surgeon: Malena Andrew PA-C;  Location: [facility]     LAPAROSCOPY DIAGNOSTIC (GENERAL) N/A 12/6/2017    Procedure: LAPAROSCOPY DIAGNOSTIC (GENERAL);  Diagnostic Laparoscopy, Exploratory Laparotomy Anesthesia Block ;  Surgeon: Rob Dudley MD;  Location: [facility]     LAPAROTOMY EXPLORATORY N/A 12/6/2017    Procedure: LAPAROTOMY EXPLORATORY;;  Surgeon: Rob Dudley MD;  Location: [facility]     ORTHOPEDIC SURGERY Left     HIP ARTHROPLASTY     PICC INSERTION Right 02/23/2018    5Fr DL BioFlo PICC, 44cm (3cm external) in the R basilic vein w/ tip in the SVC RA junction       Ulcerative colitis.  Left hip replacement.       Cancer History:   As above    Allergies:  Allergies as of 03/08/2018 - Julius as Reviewed 03/08/2018   Allergen Reaction Noted     Liquid adhesive Rash 03/01/2018     Current Medications:  Current Outpatient Prescriptions   Medication Sig Dispense Refill     OXYCODONE HCL PO Take 5 mg by mouth every 24 hours       parenteral nutrition - PTA/DISCHARGE ORDER Inject into the vein daily See medication history note from 2/20/18 for most recent TPN formula.       oxyCODONE (OXYCONTIN) 10 MG 12 hr tablet Take 2 tablets (20 mg) by mouth every 12 hours 120 tablet 0     ondansetron (ZOFRAN-ODT) 8 MG ODT tab Take 1 tablet (8 mg) by mouth every 8 hours 30 tablet 1     omeprazole (PRILOSEC) 20 MG CR capsule Take 1 capsule (20 mg) by mouth daily 30 capsule 11     LORazepam (ATIVAN) 0.5 MG tablet Take 1 tablet (0.5 mg) by mouth every 4 hours as needed (Anxiety, Nausea/Vomiting or Sleep) 30 tablet 5     Ferrous Sulfate (IRON SUPPLEMENT PO) Take 325 mg by mouth 2 times daily (with meals)        vancomycin 1,500 mg Inject 1,500 mg into the vein every 12 hours for 14 days (Patient not taking: Reported on 3/8/2018)  0     opium tincture tincture Take 0.6 mLs (6 mg) by mouth 4 times daily (Patient not taking: Reported on 3/1/2018) 72 mL 0     diphenoxylate-atropine (LOMOTIL) 2.5-0.025 MG per tablet Take 1-2 tablets by mouth 4 times daily as needed for diarrhea (Patient not taking: Reported on 3/1/2018) 240 tablet 5     amylase-lipase-protease (CREON) 55582 UNITS CPEP Take 2 capsules (72,000 Units) by mouth 3 times daily (with meals) And 1 capsule with each snack tid. (Patient not taking: Reported on 3/8/2018) 240 capsule 3     Psyllium (METAMUCIL FIBER) 51.7 % PACK Take 1 packet by mouth 3 times daily (Patient not taking: Reported on 3/1/2018) 90 each 3      Family History:  Family History   Problem Relation Age of Onset     Hypertension Father      DIABETES Father       No family  "history of cancer.  He does not have any kids.       Social History:  Social History     Social History     Marital status: Single     Spouse name: N/A     Number of children: N/A     Years of education: N/A     Occupational History     Not on file.     Social History Main Topics     Smoking status: Never Smoker     Smokeless tobacco: Never Used     Alcohol use 3.0 oz/week     5 Cans of beer per week      Comment: none for last 4 months     Drug use: No     Sexual activity: Not on file     Other Topics Concern     Not on file     Social History Narrative   He does not smoke and does not drink.  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  He lives with his girlfriend, Bessie.       Physical Exam:  /78 (BP Location: Left arm, Cuff Size: Adult Regular)  Pulse 93  Temp 98.3  F (36.8  C) (Oral)  Resp 16  Ht 1.753 m (5' 9.02\")  Wt 69.2 kg (152 lb 8 oz)  SpO2 98%  BMI 22.51 kg/m2  CONSTITUTIONAL: He looks well and he is in no acute distress  EYES: PERRLA, mild palor no icterus.   ENT/MOUTH: no mouth lesions. Ears normal  CVS: s1s2 no m r g .   RESPIRATORY: clear to auscultation b/l  GI: Abdomen is soft and nontender. Ostomy site is clean. He does not have any pain on deep palpation. No organomegaly  NEURO: AAOX3  Grossly non focal neuro exam  INTEGUMENT: no obvious rashes. The site of previous Port-A-Cath is also clean  LYMPHATIC: no palpable cervical, supraclavicular, axillary LAD  MUSCULOSKELETAL: Unremarkable. No bony tenderness.   EXTREMITIES: no edema. PICC site in the right arm is clean  PSYCH: Mentation, mood and affect are normal. Decision making capacity is intact        Laboratory/Imaging Studies    Reviewed    NGS PANEL COLORECTAL    No mutations were identified in analyzed regions of KRAS, NRAS, BRAF, HRAS, or PIK3CA.       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  The sigmoid carcinoma is obstructing, requiring exploratory laparotomy and " end ileostomy along with small bowel resection.    MSI intact and NGS panel does not reveal any identifiable mutations     He has been started on FOLFOX palliative chemotherapy. After 6 cycles, he had stable disease      On 12/6/17, he had Diagnostic laparoscopy and Exploratory laparotomy, but HIPEC was not performed as Peritoneal cancer index was 26 with diffuse involvement of the small bowel as well as the small bowel mesentery.     Repeat CT scan showed worsening peritoneal carcinomatosis and he was getting more symptomatic in terms of worsening abdominal pain and difficulty eating because of worsening abdominal cramping. He was admitted to the hospital with worsening colon distention and colon stent was placed which improved his symptoms.     He was recently hospitalized from 2/20-2/25/18 with presumed infectious colitis and bacteremia. His port was removed. He was discharged home on IV vancomycin via PICC line. He completed vancomycin on 3/7/2018.    He is doing well and we will resume FOLFOX today. Due to prior neuropathy I will decrease the dose of oxaliplatin to 70 mg per metered square.  As he has K-gabriela wild type he will be a candidate for anti-EGFR therapy in the future. I do not want to started right away because I want to see some stability in his condition. We can also use and Avastin in the future.     Tentative plan is to give him at least a couple of months of chemotherapy before repeat imaging        Abdominal pain due to peritoneal carcinomatosis and colon obstruction. For now he will continue with OxyContin and as needed oxycodone. I refilled her oxycodone today. We again discussed that at this time it is uncertain how much of the active medication is in the pills which he is seeing in his ostomy bag. He does not see it all the time. He is wondering if he would take it with food and then it might be better and I believe it is reasonable to do that. He will follow with palliative care. We discussed  possibility of switching him to other opiate rather than OxyContin like Fentanyl patch but he will discuss this with palliative care    Nausea. This is mild. Continue as needed antiemetics.     Nutrition. This is improved after colonic stent placement. He is now eating and consuming 2615-6519 anel per day. He is also on TPN 12 hours a day. We discussed that when he will be on 5-FU pump he will not be able to use TPN for the 2 days. He is okay with that. I believe it should be fine because otherwise his nutrition has significantly improved and he has gained some weight. I advised him to keep himself very well hydrated. He will continue follow-up nutritionist    Anemia. Recent ferritin is 104. Iron saturation is a little low. He will continue to take oral iron. He might need blood transfusions going forward.    In 2 weeks he will return to clinic with labs and seeing Linda and next chemotherapy.        I answered all of his questions to his satisfaction and he is agreeable and comfortable with the plan    Albert Long

## 2018-03-08 NOTE — NURSING NOTE
Chief Complaint   Patient presents with     Blood Draw     Labs drawn PICC line flushed with saline.  Good blood rerturn. Vs taken and pt checked in for appt     Labs collected from PICC.  Line flushed with saline.  Vitals taken and pt checked in for appt.    Cayla Martell RN

## 2018-03-08 NOTE — NURSING NOTE
"Oncology Rooming Note    March 8, 2018 12:57 PM   Sebas Lopez is a 52 year old male who presents for:    Chief Complaint   Patient presents with     Blood Draw     Labs drawn PICC line flushed with saline.  Good blood rerturn. Vs taken and pt checked in for appt     Oncology Clinic Visit     Return: Colon ca      Initial Vitals: /78 (BP Location: Left arm, Cuff Size: Adult Regular)  Pulse 93  Temp 98.3  F (36.8  C) (Oral)  Resp 16  Ht 1.753 m (5' 9.02\")  Wt 69.2 kg (152 lb 8 oz)  SpO2 98%  BMI 22.51 kg/m2 Estimated body mass index is 22.51 kg/(m^2) as calculated from the following:    Height as of this encounter: 1.753 m (5' 9.02\").    Weight as of this encounter: 69.2 kg (152 lb 8 oz). Body surface area is 1.84 meters squared.  Mild Pain (3) Comment: Data Unavailable   No LMP for male patient.  Allergies reviewed: Yes  Medications reviewed: Yes    Medications: Medication refills not needed today.  Pharmacy name entered into [employer]: [facility] [place] - [place], [place] - [address] [place] [address]    Clinical concerns: pain level 3 in abdomen.  I informed pt to remind the Provider/ELIF about  pain level in case i dont touch bases with them, if the provider was in the exam room while i attend on rooming the next pt. Pt verbalized understandings.  Carlie De La Paz CMA       6 minutes for nursing intake (face to face time)     Carlie De La Paz CMA                  "

## 2018-03-08 NOTE — LETTER
3/8/2018       RE: Sebas Lopez  [address] [place] [place] [address]     Dear Colleague,    Thank you for referring your patient, Sebas Lopez, to the [facility]. Please see a copy of my visit note below.    Oncology follow up visit:  Date on this visit: 3/8/2018         CC  sigmoid adenocarcinoma with peritoneal carcinomatosis    Primary Physician: Waylon Han     History Of Present Illness:     Please see previous note for details. I have copied and updated from prior notes.   Sebas is a 52-year-old male who has a history of ulcerative colitis diagnosed more than 20 years ago, but he has not had medical management for that.  He was doing well, but in 05/2017 he presented with a few weeks of abdominal pain.  At that time, his imaging showed that he had a sigmoid wall thickening with adjacent mesenteric lymphadenopathy and free fluid near the abdominal wall mesh from his prior hernia surgery.  He subsequently underwent a colonoscopy which was incomplete and showed an obstructing sigmoid colon mass.  The biopsy from the sigmoid mass showed sigmoid adenocarcinoma.  He then developed obstructive symptoms and underwent exploratory laparotomy on 07/10/2017.  During that he was found to have diffuse peritoneal carcinomatosis.  Extensive lysis of adhesions was performed and a small bowel resection was performed with end ileostomy.  The biopsies from the multiple large peritoneal metastasis also were positive for metastatic adenocarcinoma. We still haven't received testing on MSI or NGS panel back     He started palliative FOLFOX on 8/11/17. C#6 given on 10/26/17  After 6 cycles, he has stable disease    On 12/6/17, he had Diagnostic laparoscopy and Exploratory laparotomy, but HIPEC was not performed as Peritoneal cancer index was 26 with diffuse involvement of the small bowel as well as the small bowel mesentery.     He then presented to ED on 1/26 with abdominal pain and  associated nausea. CT A/P on 1/26 with 5.2 x 4.5 x 3.6 cm proximal sigmoid mass causnig colonic obstriction with singificant distention of cecum (7cm in diameter), ascending colon and proximal transverse colon. No pneumatosis or pneumoperitoneum. Also small volume of ascites with associated findings concerning for peritoneal carcinomatosis, increased from prior studies. Dr. Dudley was consulted and recommended no surgical intervention.    He was then seen in clinic on 1/29/2018 as well as yesterday because for the last few days he was unable to tolerate solid foods and he was getting abdominal cramping and some nausea. He also noticed that his ostomy output decreased. He was given IV fluids as well as antibiotics and yesterday he was started on OxyContin 10 mg twice a day along with p.r.n. oxycodone. Of note a few weeks ago he was started on short acting octreotide to decrease the ostomy output but he stopped taking it about one week ago and few days prior to that he noticed worsening of the abdominal cramping.      Repeat CT scan showed worsening peritoneal carcinomatosis and he is getting more symptomatic in terms of worsening abdominal pain and difficulty eating because of worsening abdominal cramping. He was admitted to the hospital with worsening colon distention and colon stent was placed which improved his symptoms.     He was recently hospitalized from 2/20-2/25/18 with presumed infectious colitis and bacteremia. His port was removed. He was discharged home on IV vancomycin why a PICC line which he completed on 3/7/2018.     Interim history  He is doing better. He thinks his energy has improved. He is eating well and is usually consuming 8679-1692 anel a day. He continues to use TPN 12 hours a day. He has a PICC line which is working well. Port is out. Occasional nausea with is not very bothersome. No vomiting. He continues to have abdominal pain. Occasionally he has seen OxyContin pills in his ostomy bag  and previously we had discussed with pharmacy that at this time we are not sure how much of the active medication is absorbed and how much matrix is they are in the pills which he is seen. On average she takes oxycodone on 6-8 tablets a day to control his pain. Otherwise ostomy is working well. No bleeding. No fevers or further infections. No new swellings. Presently he does not have any neuropathy. He continues to take oral iron without problems.    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      HERNIA REPAIR       INSERT PORT VASCULAR ACCESS Right 8/10/2017    Procedure: INSERT PORT VASCULAR ACCESS;  Single Lumen Right Chest Power Port;  Surgeon: Malena Andrew PA-C;  [facility]     LAPAROSCOPY DIAGNOSTIC (GENERAL) N/A 12/6/2017    Procedure: LAPAROSCOPY DIAGNOSTIC (GENERAL);  Diagnostic Laparoscopy, Exploratory Laparotomy Anesthesia Block ;  Surgeon: Rob Dudley MD;  Location: [facility]     LAPAROTOMY EXPLORATORY N/A 12/6/2017    Procedure: LAPAROTOMY  EXPLORATORY;;  Surgeon: Rob Dudley MD;  Location: [facility]     ORTHOPEDIC SURGERY Left     HIP ARTHROPLASTY     PICC INSERTION Right 02/23/2018    5Fr DL BioFlo PICC, 44cm (3cm external) in the R basilic vein w/ tip in the SVC RA junction      Ulcerative colitis.  Left hip replacement.       Cancer History:   As above    Allergies:  Allergies as of 03/08/2018 - Julius as Reviewed 03/08/2018   Allergen Reaction Noted     Liquid adhesive Rash 03/01/2018     Current Medications:  Current Outpatient Prescriptions   Medication Sig Dispense Refill     OXYCODONE HCL PO Take 5 mg by mouth every 24 hours       parenteral nutrition - PTA/DISCHARGE ORDER Inject into the vein daily See medication history note from 2/20/18 for most recent TPN formula.       oxyCODONE (OXYCONTIN) 10 MG 12 hr tablet Take 2 tablets (20 mg) by mouth every 12 hours 120 tablet 0     ondansetron (ZOFRAN-ODT) 8 MG ODT tab Take 1 tablet (8 mg) by mouth every 8 hours 30 tablet 1     omeprazole (PRILOSEC) 20 MG CR capsule Take 1 capsule (20 mg) by mouth daily 30 capsule 11     LORazepam (ATIVAN) 0.5 MG tablet Take 1 tablet (0.5 mg) by mouth every 4 hours as needed (Anxiety, Nausea/Vomiting or Sleep) 30 tablet 5     Ferrous Sulfate (IRON SUPPLEMENT PO) Take 325 mg by mouth 2 times daily (with meals)        vancomycin 1,500 mg Inject 1,500 mg into the vein every 12 hours for 14 days (Patient not taking: Reported on 3/8/2018)  0     opium tincture tincture Take 0.6 mLs (6 mg) by mouth 4 times daily (Patient not taking: Reported on 3/1/2018) 72 mL 0     diphenoxylate-atropine (LOMOTIL) 2.5-0.025 MG per tablet Take 1-2 tablets by mouth 4 times daily as needed for diarrhea (Patient not taking: Reported on 3/1/2018) 240 tablet 5     amylase-lipase-protease (CREON) 96602 UNITS CPEP Take 2 capsules (72,000 Units) by mouth 3 times daily (with meals) And 1 capsule with each snack tid. (Patient not taking: Reported on 3/8/2018) 240 capsule 3     Psyllium  "(METAMUCIL FIBER) 51.7 % PACK Take 1 packet by mouth 3 times daily (Patient not taking: Reported on 3/1/2018) 90 each 3      Family History:  Family History   Problem Relation Age of Onset     Hypertension Father      DIABETES Father       No family history of cancer.  He does not have any kids.       Social History:  Social History     Social History     Marital status: Single     Spouse name: N/A     Number of children: N/A     Years of education: N/A     Occupational History     Not on file.     Social History Main Topics     Smoking status: Never Smoker     Smokeless tobacco: Never Used     Alcohol use 3.0 oz/week     5 Cans of beer per week      Comment: none for last 4 months     Drug use: No     Sexual activity: Not on file     Other Topics Concern     Not on file     Social History Narrative   He does not smoke and does not drink.  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  He lives with his girlfriend, Bessie.       Physical Exam:  /78 (BP Location: Left arm, Cuff Size: Adult Regular)  Pulse 93  Temp 98.3  F (36.8  C) (Oral)  Resp 16  Ht 1.753 m (5' 9.02\")  Wt 69.2 kg (152 lb 8 oz)  SpO2 98%  BMI 22.51 kg/m2  CONSTITUTIONAL: He looks well and he is in no acute distress  EYES: PERRLA, mild palor no icterus.   ENT/MOUTH: no mouth lesions. Ears normal  CVS: s1s2 no m r g .   RESPIRATORY: clear to auscultation b/l  GI: Abdomen is soft and nontender. Ostomy site is clean. He does not have any pain on deep palpation. No organomegaly  NEURO: AAOX3  Grossly non focal neuro exam  INTEGUMENT: no obvious rashes. The site of previous Port-A-Cath is also clean  LYMPHATIC: no palpable cervical, supraclavicular, axillary LAD  MUSCULOSKELETAL: Unremarkable. No bony tenderness.   EXTREMITIES: no edema. PICC site in the right arm is clean  PSYCH: Mentation, mood and affect are normal. Decision making capacity is intact        Laboratory/Imaging Studies    Reviewed    NGS PANEL COLORECTAL    No " mutations were identified in analyzed regions of KRAS, NRAS, BRAF, HRAS, or PIK3CA.       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  The sigmoid carcinoma is obstructing, requiring exploratory laparotomy and end ileostomy along with small bowel resection.    MSI intact and NGS panel does not reveal any identifiable mutations     He has been started on FOLFOX palliative chemotherapy. After 6 cycles, he had stable disease      On 12/6/17, he had Diagnostic laparoscopy and Exploratory laparotomy, but HIPEC was not performed as Peritoneal cancer index was 26 with diffuse involvement of the small bowel as well as the small bowel mesentery.     Repeat CT scan showed worsening peritoneal carcinomatosis and he was getting more symptomatic in terms of worsening abdominal pain and difficulty eating because of worsening abdominal cramping. He was admitted to the hospital with worsening colon distention and colon stent was placed which improved his symptoms.     He was recently hospitalized from 2/20-2/25/18 with presumed infectious colitis and bacteremia. His port was removed. He was discharged home on IV vancomycin via PICC line. He completed vancomycin on 3/7/2018.    He is doing well and we will resume FOLFOX today. Due to prior neuropathy I will decrease the dose of oxaliplatin to 70 mg per metered square.  As he has K-gabriela wild type he will be a candidate for anti-EGFR therapy in the future. I do not want to started right away because I want to see some stability in his condition. We can also use and Avastin in the future.     Tentative plan is to give him at least a couple of months of chemotherapy before repeat imaging        Abdominal pain due to peritoneal carcinomatosis and colon obstruction. For now he will continue with OxyContin and as needed oxycodone. I refilled her oxycodone today. We again discussed that at this time it is uncertain how much of the active  medication is in the pills which he is seeing in his ostomy bag. He does not see it all the time. He is wondering if he would take it with food and then it might be better and I believe it is reasonable to do that. He will follow with palliative care. We discussed possibility of switching him to other opiate rather than OxyContin like Fentanyl patch but he will discuss this with palliative care    Nausea. This is mild. Continue as needed antiemetics.     Nutrition. This is improved after colonic stent placement. He is now eating and consuming 5094-3665 anel per day. He is also on TPN 12 hours a day. We discussed that when he will be on 5-FU pump he will not be able to use TPN for the 2 days. He is okay with that. I believe it should be fine because otherwise his nutrition has significantly improved and he has gained some weight. I advised him to keep himself very well hydrated. He will continue follow-up nutritionist    Anemia. Recent ferritin is 104. Iron saturation is a little low. He will continue to take oral iron. He might need blood transfusions going forward.    In 2 weeks he will return to clinic with labs and seeing Linda and next chemotherapy.        I answered all of his questions to his satisfaction and he is agreeable and comfortable with the plan    Albert Long

## 2018-03-08 NOTE — MR AVS SNAPSHOT
After Visit Summary   3/8/2018    Sebas Lopez    MRN: [id number]           Patient Information     Date Of Birth          1965        Visit Information        Provider Department      3/8/2018 1:00 PM Albert Long MD [facility]        Today's Diagnoses     Peritoneal carcinomatosis (H)    -  1      Care Instructions    Chemo today    Refilled oxycodone    In 2 weeks, repeat labs and see Linda and chemo              Follow-ups after your visit        Your next 10 appointments already scheduled     Mar 29, 2018  4:15 PM CDT   (Arrive by 4:00 PM)   Return Visit with Sukhdeep Mota MD   [facility] ([facility])    [address]  [address]  [facility] [address] [phone]              Who to contact     If you have questions or need follow up information about today's clinic visit or your schedule please contact [facility] directly at [phone].  Normal or non-critical lab and imaging results will be communicated to you by [employer], letter or phone within 4 business days after the clinic has received the results. If you do not hear from us within 7 days, please contact the clinic through [employer] or phone. If you have a critical or abnormal lab result, we will notify you by phone as soon as possible.  Submit refill requests through [employer] or call your pharmacy and they will forward the refill request to us. Please allow 3 business days for your refill to be completed.          Additional Information About Your Visit        MyChart Information     [employer] gives you secure access to your electronic health record. If you see a primary care provider, you can also send messages to your care team and make appointments. If you have questions, please call your primary care clinic.  If you do not have a primary care provider, please call [phone] and they will assist you.        Care EveryWhere ID   "   This is your Care EveryWhere ID. This could be used by other organizations to access your [place] medical records  [id number]        Your Vitals Were     Pulse Temperature Respirations Height Pulse Oximetry BMI (Body Mass Index)    93 98.3  F (36.8  C) (Oral) 16 1.753 m (5' 9.02\") 98% 22.51 kg/m2       Blood Pressure from Last 3 Encounters:   03/08/18 120/78   03/01/18 116/72   02/25/18 113/70    Weight from Last 3 Encounters:   03/08/18 69.2 kg (152 lb 8 oz)   03/01/18 68.5 kg (151 lb 1.6 oz)   02/25/18 69.1 kg (152 lb 6.4 oz)              Today, you had the following     No orders found for display         Today's Medication Changes          These changes are accurate as of 3/8/18  1:37 PM.  If you have any questions, ask your nurse or doctor.               These medicines have changed or have updated prescriptions.        Dose/Directions    * OXYCODONE HCL PO   This may have changed:  Another medication with the same name was added. Make sure you understand how and when to take each.   Changed by:  Albert Long MD        Dose:  5 mg   Take 5 mg by mouth every 24 hours   Refills:  0       * oxyCODONE 10 MG 12 hr tablet   Commonly known as:  OXYCONTIN   This may have changed:  Another medication with the same name was added. Make sure you understand how and when to take each.   Used for:  Peritoneal carcinomatosis (H), Cancer of sigmoid colon (H)   Changed by:  Albert Long MD        Dose:  20 mg   Take 2 tablets (20 mg) by mouth every 12 hours   Quantity:  120 tablet   Refills:  0       * oxyCODONE 5 MG capsule   Commonly known as:  OXY-IR   This may have changed:  You were already taking a medication with the same name, and this prescription was added. Make sure you understand how and when to take each.   Used for:  Peritoneal carcinomatosis (H)   Changed by:  Albert Long MD        Dose:  5-10 mg   Take 1-2 capsules (5-10 mg) by mouth every 4 hours as needed for moderate to severe pain   Quantity:  " 100 capsule   Refills:  0       * Notice:  This list has 3 medication(s) that are the same as other medications prescribed for you. Read the directions carefully, and ask your doctor or other care provider to review them with you.         Where to get your medicines      Some of these will need a paper prescription and others can be bought over the counter.  Ask your nurse if you have questions.     Bring a paper prescription for each of these medications     oxyCODONE 5 MG capsule                Primary Care Provider Office Phone # Fax #    Jaguar Becker [phone]       [facility] [address] [place] [address] [address]  [facility] [address]        Equal Access to Services     [facility]: Hadii aad maureen hadasho Soomaali, waaxda luqadaha, qaybta kaalmada adebenitayarita, bhargav sage . So [facility] [phone].    ATENCIÓN: Si habla español, tiene a cid disposición servicios gratuitos de asistencia lingüística. Ll[facility] [phone].    We comply with applicable federal civil rights laws and [place] laws. We do not discriminate on the basis of race, color, national origin, age, disability, sex, sexual orientation, or gender identity.            Thank you!     Thank you for choosing [facility]  for your care. Our goal is always to provide you with excellent care. Hearing back from our patients is one way we can continue to improve our services. Please take a few minutes to complete the written survey that you may receive in the mail after your visit with us. Thank you!             Your Updated Medication List - Protect others around you: Learn how to safely use, store and throw away your medicines at www.disposemymeds.org.          This list is accurate as of 3/8/18  1:37 PM.  Always use your most recent med list.                   Brand Name Dispense Instructions for use Diagnosis    amylase-lipase-protease 59875 UNITS Cpep    CREON    240 capsule    Take 2 capsules (72,000  Units) by mouth 3 times daily (with meals) And 1 capsule with each snack tid.    Diarrhea, unspecified type, Peritoneal carcinomatosis (H), Cancer of sigmoid colon (H)       diphenoxylate-atropine 2.5-0.025 MG per tablet    LOMOTIL    240 tablet    Take 1-2 tablets by mouth 4 times daily as needed for diarrhea    Diarrhea due to drug       IRON SUPPLEMENT PO      Take 325 mg by mouth 2 times daily (with meals)        LORazepam 0.5 MG tablet    ATIVAN    30 tablet    Take 1 tablet (0.5 mg) by mouth every 4 hours as needed (Anxiety, Nausea/Vomiting or Sleep)    Peritoneal carcinomatosis (H), Cancer of sigmoid colon (H)       omeprazole 20 MG CR capsule    priLOSEC    30 capsule    Take 1 capsule (20 mg) by mouth daily    Diarrhea, unspecified type       ondansetron 8 MG ODT tab    ZOFRAN-ODT    30 tablet    Take 1 tablet (8 mg) by mouth every 8 hours    Nausea       opium tincture tincture     72 mL    Take 0.6 mLs (6 mg) by mouth 4 times daily    Diarrhea, unspecified type, Cancer of sigmoid colon (H), Peritoneal carcinomatosis (H)       * OXYCODONE HCL PO      Take 5 mg by mouth every 24 hours        * oxyCODONE 10 MG 12 hr tablet    OXYCONTIN    120 tablet    Take 2 tablets (20 mg) by mouth every 12 hours    Peritoneal carcinomatosis (H), Cancer of sigmoid colon (H)       * oxyCODONE 5 MG capsule    OXY-IR    100 capsule    Take 1-2 capsules (5-10 mg) by mouth every 4 hours as needed for moderate to severe pain    Peritoneal carcinomatosis (H)       parenteral nutrition - PTA/DISCHARGE ORDER      Inject into the vein daily See medication history note from 2/20/18 for most recent TPN formula.        Psyllium 51.7 % Pack    METAMUCIL FIBER    90 each    Take 1 packet by mouth 3 times daily    Diarrhea, unspecified type       vancomycin 1,500 mg      Inject 1,500 mg into the vein every 12 hours for 14 days    Bacteremia       * Notice:  This list has 3 medication(s) that are the same as other medications prescribed  for you. Read the directions carefully, and ask your doctor or other care provider to review them with you.

## 2018-03-16 NOTE — PROGRESS NOTES
This is a recent snapshot of the patient's [facility] medical record.  For current drug dose and complete information and questions, call [phone] or In Basket pool, [facility]v home infusion ([id number])  CSN Number:  [id number]

## 2018-03-16 NOTE — PROGRESS NOTES
This is a recent snapshot of the patient's [facility] medical record.  For current drug dose and complete information and questions, call [phone] or In Basket pool, fv home infusion ([id number])  CSN Number:  [id number]

## 2018-03-18 NOTE — LETTER
Transition Communication Hand-off for Care Transitions to Next Level of Care Provider    Name: Sebas Lopez  : 1965  MRN #: [id number]  Primary Care Provider: NGUYỄN HERNÁNDEZ     Primary Clinic: [facility] [address]  [place] [address]     Reason for Hospitalization:  Large bowel obstruction [K56.609]  Peritoneal carcinomatosis (H) [C78.6, C80.1]  Malignant neoplasm of colon, unspecified part of colon (H) [C18.9]  Admit Date/Time: 3/18/2018  3:36 AM  Discharge Date:  2018  Discharge Plan:  Discharge to home with resumption of home care services.  Discharge Needs Assessment:  Needs       Most Recent Value    Home Care [facility] [phone], Fax: [phone]    Home Infusion Provider [facility] [phone], Fax: [phone]      Follow-up plan:  Future Appointments  Date Time Provider Department Center   3/22/2018 6:30 AM  [facility] DRAW [facility]   3/22/2018 7:00 AM Linda Alves PA-C [facility]   3/22/2018 7:30 AM [facility]   3/29/2018 4:15 PM Sukhdeep Mota MD [facility]     Referrals     Future Labs/Procedures    Home care nursing referral     Comments:    Please fax discharge orders to [facility]    Ph:  [phone]    Fax: [phone]    And fax discharge orders to [place], [place]    Ph: [phone]    Fax: [phone]    Skilled home care RN for resumption of home TPN administration via [facility].  [facility] cares per home care agency routine.    Skilled home care RN  to evaluate medication management, nutrition and hydration evaluation, endurance evaluation, and general status evaluation after discharge from the acute care hospital setting.    Skilled home care RN to assist with evaluation of gastro intestinal status including review of ostomy education as needed.    Skilled home care RN to assist with MD team plans of care as outlined in discharge orders.             Kita Manriquez, NYDIA.S.N., P.H.N.,R.N.         Pager

## 2018-03-18 NOTE — ED NOTES
[facility], [place]   ED Nurse to Floor Handoff     Sebas Lopez is a 52 year old male who speaks English and lives with a spouse,  in a home  They arrived in the ED by car from home    ED Chief Complaint: Abdominal Pain and Nausea    ED Dx;   Final diagnoses:   Large bowel obstruction   Malignant neoplasm of colon, unspecified part of colon (H)   Peritoneal carcinomatosis (H)         Needed?: No    Allergies:   Allergies   Allergen Reactions     Liquid Adhesive Rash     Dermabond, rash with pustules, occurred with abdominal surgery and port removal    .  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     Ulcerative colitis (H)       Baseline Mental status: WDL  Current Mental Status changes: at basesline    Infection: No  Sepsis suspected: No  Isolation type: No active isolations     Activity level - Baseline/Home:  Independent  Activity Level - Current:   Independent    Bariatric equipment needed?: No    In the ED these meds were given:   Medications   lidocaine 1 % 1 mL (not administered)   lidocaine (LMX4) kit (not administered)   sodium chloride (PF) 0.9% PF flush 3 mL (not administered)   sodium chloride (PF) 0.9% PF flush 3 mL (not administered)   0.9% sodium chloride BOLUS (0 mLs Intravenous Stopped 3/18/18 0637)     Followed by   sodium chloride 0.9% infusion (1,000 mLs Intravenous New Bag 3/18/18 0757)   HYDROmorphone (PF) (DILAUDID) injection 0.5 mg (0.5 mg Intravenous Given 3/18/18 0928)   iopamidol (ISOVUE-370) solution 92 mL (92 mLs Intravenous Given 3/18/18 0632)   sodium chloride (PF) 0.9% PF flush 73 mL (73 mLs Intravenous Given 3/18/18 0631)   HYDROmorphone  (PF) (DILAUDID) injection 0.5 mg (0.5 mg Intravenous Given 3/18/18 2221)   ondansetron (ZOFRAN) injection 4 mg (4 mg Intravenous Given 3/18/18 4016)   HYDROmorphone (PF) (DILAUDID) injection 0.5 mg (0.5 mg Intravenous Given 3/18/18 8784)       Drips running?  Yes    Home pump or pre-existing LDA's present? No    Labs results:   Labs Ordered and Resulted from Time of ED Arrival Up to the Time of Departure from the ED   CBC WITH PLATELETS DIFFERENTIAL - Abnormal; Notable for the following:        Result Value    RBC Count 3.35 (*)     Hemoglobin 9.4 (*)     Hematocrit 29.4 (*)     RDW 17.0 (*)     Absolute Monocytes 1.5 (*)     All other components within normal limits   COMPREHENSIVE METABOLIC PANEL - Abnormal; Notable for the following:     Glucose 101 (*)     Albumin 2.8 (*)     Alkaline Phosphatase 161 (*)     All other components within normal limits   LIPASE   LACTIC ACID WHOLE BLOOD   ROUTINE UA WITH MICROSCOPIC REFLEX TO CULTURE   PERIPHERAL IV CATHETER       Imaging Studies:   Recent Results (from the past 24 hour(s))   CT Abdomen Pelvis w Contrast   Result Value    Radiologist flags Large bowel obstruction (Urgent)    Narrative    EXAM: CT ABDOMEN PELVIS W CONTRAST 3/18/2018 6:39 AM     History: Pain, colon cancer, evaluate for obstruction    Comparison: CT of the abdomen and pelvis 2/20/2018    Technique: CT of the abdomen and pelvis were obtained with IV  contrast. Sagittal and coronal reconstructions created and reviewed.    Contrast: 92 mL Isovue-370    Findings:   The liver, gallbladder, adrenal glands, spleen, and pancreas appear  normal. There is symmetric nephrographic phase without hydronephrosis  or renal calculi. The urinary bladder appears normal, partially  obscured by metallic streak artifact.    There are postsurgical changes of end ileostomy in the right lower  quadrant as well as colonic stent placement in the sigmoid colon.  There is marked pancolonic dilatation as well as mild distention  "of  the terminal ileum. Anastomotic changes of the ileum (series 5 image  340). The stent appears similar in position to previous CT, however  the lumen of the stent is decreased in diameter from previous and  continuous patency with the rectum is not well seen. A sigmoid mass is  again noted which is not substantially changed in size from previous  CT. Small bowel loops are normal in caliber throughout. No pneumatosis  or portal venous gas. No intra-abdominal abscess.    Peritoneal soft tissue nodularity and omental caking in the upper  anterior abdomen and pelvis is not significantly changed from previous  CT. Small volume of abdominal free fluid. Scattered enlarged  mesenteric lymph nodes are not substantially changed from previous CT.  The major abdominal vasculature is patent. No abdominal free air.    Post surgical changes of left total hip arthroplasty and subchondral  cystic changes of the acetabulum. Chronic deformities of the left  lateral ribs.    Bibasilar atelectasis.      Impression    Impression:   1. Pan colonic distention concerning for distal large bowel  obstruction. Transition point likely related to sigmoid stent which is  more narrowed on this exam and appears less contiguous with the  rectum.   2. Stable peritoneal thickening and nodularity with small volume  abdominal free fluid likely related to peritoneal carcinomatosis.  3. Stable prominent mesenteric and retroperitoneal lymph nodes which  may be metastatic or reactive.    [Urgent Result: Large bowel obstruction]    Finding was identified on 3/18/2018 6:39 AM.     Dr. Rowe was contacted by Dr. Dr. Joseph at 3/18/2018 6:44 AM and  verbalized understanding of the urgent finding.     I have personally reviewed the examination and initial interpretation  and I agree with the findings.    ZEUS CROSS MD ([place])       Recent vital signs:   /90  Pulse 102  Temp 98.4  F (36.9  C) (Oral)  Resp 18  Ht 1.753 m (5' 9\")  Wt 67.6 kg (149 " "lb)  SpO2 100%  BMI 22 kg/m2    Cardiac Rhythm: Normal Sinus  Pt needs tele? No  Skin/wound Issues: None    Code Status: prior code in February was \"full code\".     Pain control: fair    Nausea control: good    Abnormal labs/tests/findings requiring intervention: patient has large bowel obstruction; will have fluoro exam in radiology at about 1100 today    Family present during ED course? Yes   Family Comments/Social Situation comments: Patient lives at home with his significant other    Tasks needing completion: Patient needs UA collected and fluroscopy later today    COLTON SCHMITT RN  ascom-- [phone] [facility]  [phone] [facility] ED    "

## 2018-03-18 NOTE — ED PROVIDER NOTES
"  History     Chief Complaint   Patient presents with     Abdominal Pain     Nausea     HPI  Sebas Lopez is a 52 year old male with history of metastatic adenocarcinoma of the sigmoid colon who presents with increasingly intense abdominal pain over past 1-2 days. He has a stent in the colon and an ileostomy. He is nauseated. No vomiting. No fever or chills. No bloody stools. Poor appetite. Notes some stool output and ileostomy output.     I have reviewed the Medications, Allergies, Past Medical and Surgical History, and Social History in the [employer] system.  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 Negative for chills, diaphoresis and fever.   HENT: Negative for congestion and rhinorrhea.    Respiratory: Negative for cough, chest tightness and shortness of breath.    Cardiovascular: Negative for chest pain, palpitations and leg swelling.   Gastrointestinal: Positive for abdominal pain and nausea. Negative for blood in stool and vomiting.   Musculoskeletal: Negative for arthralgias, myalgias and neck pain.   Skin: Negative for rash.   Allergic/Immunologic: Positive for immunocompromised state.   Neurological: Negative for dizziness, syncope, light-headedness and headaches.   Hematological: Does not bruise/bleed easily.   Psychiatric/Behavioral: Negative for confusion.       Physical Exam   BP: (!) 124/92  Pulse: 102  Temp: 98.4  F (36.9  C)  Resp: 18  Height: 175.3 cm (5' 9\")  Weight: 67.6 kg (149 lb)  SpO2: 98 %      Physical Exam   Constitutional: He appears well-developed and well-nourished. No distress.   HENT:   Head: " Normocephalic and atraumatic.   Mouth/Throat: Oropharynx is clear and moist.   Eyes: Conjunctivae are normal.   Neck: Normal range of motion.   Cardiovascular: Normal rate, regular rhythm, normal heart sounds and intact distal pulses.    Pulmonary/Chest: Effort normal and breath sounds normal. No respiratory distress.   Abdominal: He exhibits distension. Bowel sounds are decreased. There is generalized tenderness. There is guarding. There is no rigidity and no rebound.   Musculoskeletal: He exhibits no edema or tenderness.   Neurological: He is alert.   Skin: Skin is warm and dry.   Psychiatric: He has a normal mood and affect. His behavior is normal.   Nursing note and vitals reviewed.      ED Course     ED Course     Procedures             Critical Care time:  none             Labs Ordered and Resulted from Time of ED Arrival Up to the Time of Departure from the ED   CBC WITH PLATELETS DIFFERENTIAL - Abnormal; Notable for the following:        Result Value    RBC Count 3.35 (*)     Hemoglobin 9.4 (*)     Hematocrit 29.4 (*)     RDW 17.0 (*)     Absolute Monocytes 1.5 (*)     All other components within normal limits   COMPREHENSIVE METABOLIC PANEL - Abnormal; Notable for the following:     Glucose 101 (*)     Albumin 2.8 (*)     Alkaline Phosphatase 161 (*)     All other components within normal limits   LIPASE   LACTIC ACID WHOLE BLOOD   ROUTINE UA WITH MICROSCOPIC REFLEX TO CULTURE   PERIPHERAL IV CATHETER       Consults  Oncology: Responded (hem) (03/18/18 1353)    Assessments & Plan (with Medical Decision Making)   Metastatic adenocarcinoma of colon. Large bowel obstruction. Discussed with oncology and colon rectal surgery. He does not want ng tube at this time. Given IV fluids and analgesics. No evidence of perforation.    I have reviewed the nursing notes.    I have reviewed the findings, diagnosis, plan and need for follow up with the patient.    New Prescriptions    No medications on file       Final  diagnoses:   Large bowel obstruction   Malignant neoplasm of colon, unspecified part of colon (H)   Peritoneal carcinomatosis (H)       3/18/2018   [facility], [place], EMERGENCY DEPARTMENT     Miquel Rivera MD  03/18/18 0761

## 2018-03-18 NOTE — IP AVS SNAPSHOT
MRN:[id number]                      After Visit Summary   3/18/2018    Sebas Lopez    MRN: [id number]           Thank you!     Thank you for choosing [facility] for your care. Our goal is always to provide you with excellent care. Hearing back from our patients is one way we can continue to improve our services. Please take a few minutes to complete the written survey that you may receive in the mail after you visit with us. Thank you!        Patient Information     Date Of Birth          1965        Designated Caregiver       Most Recent Value    Caregiver    Will someone help with your care after discharge? yes    Name of designated caregiver Manpreet    Phone number of caregiver [phone]    Caregiver address [place], [place]      About your hospital stay     You were admitted on:  March 18, 2018 You last received care in the:  [address] [facility]    You were discharged on:  March 20, 2018        Reason for your hospital stay       You were here due to a blockage in your colon. You has a second stent placed to open the blockage.                  Who to Call     For medical emergencies, please call 911.  For non-urgent questions about your medical care, please call your primary care provider or clinic, [phone]  For questions related to your surgery, please call your surgery clinic        Attending Provider     Provider Specialty    Miquel Rivera MD Emergency Medicine    [facility] On[facility]       Primary Care Provider Office Phone # Fax #    Jaguar Becker [phone]       When to contact your care team       Please call the [facility] at [phone] if you notice increased or continued blood from your anus, or become dizzy or lightheaded. Please also call if you develop temperature above 100.4, shortness of breath, chest pain, headaches, vision changes, bleeding, uncontrolled nausea, vomiting,  diarrhea, or pain.                  After Care Instructions     Activity       Your activity upon discharge: activity as tolerated            Diet       Follow this diet upon discharge: Regular, continue normal TPN regimen                  Follow-up Appointments     Follow Up and recommended labs and tests       Scheduled appointments are listed below. Please keep your appointment for Thursday.                  Your next 10 appointments already scheduled     Mar 22, 2018  6:30 AM CDT   [facility] Draw with [facility] DRAW   [facility] Draw ([facility])    [address] [place] [address]  [address]  [facility] [address]   [phone]            Mar 22, 2018  7:00 AM CDT   (Arrive by 6:45 AM)   Return Visit with Linda Alves PA-C   [facility] ([facility])    [address] [place] [address]  [address]  [facility] [address]   [phone]            Mar 22, 2018  7:30 AM CDT   Infusion 240 with UC ONCOLOGY INFUSION, UC 13 ATC   [facility] ([facility])    [address] [place] [address]  [address] [place] [address]   [phone]            Mar 29, 2018  4:15 PM CDT   (Arrive by 4:00 PM)   Return Visit with Sukhdeep Mota MD   [facility] ([facility])    [address] [place] [address]  [address] [place] [address]   [phone]              Additional Services     Home care nursing referral       Please fax discharge orders to [facility]    Ph:      Fax: [phone]    And fax discharge orders to [place], [place]    Ph: [phone]    Fax: 1 [phone]    Skilled home care RN for resumption of home TPN administration via [facility].  [facility] cares per home care agency routine.    Skilled home care RN  to evaluate medication management, nutrition and hydration evaluation, endurance evaluation, and general status  "evaluation after discharge from the acute care hospital setting.    Skilled home care RN to assist with evaluation of gastro intestinal status including review of ostomy education as needed.    Skilled home care RN to assist with MD team plans of care as outlined in discharge orders.                  Pending Results     No orders found from 3/16/2018 to 3/19/2018.            Statement of Approval     Ordered          03/20/18 5410  I have reviewed and agree with all the recommendations and orders detailed in this document.  EFFECTIVE NOW     Approved and electronically signed by:  Ericka Lentz PA-C             Admission Information     Date & Time Provider Department Dept. Phone    3/18/2018 Kaden Hardin MD [address] [facility] [phone]      Your Vitals Were     Blood Pressure Pulse Temperature Respirations Height Weight    124/83 (BP Location: Left arm) 99 98.4  F (36.9  C) (Oral) 16 1.753 m (5' 9\") 72.2 kg (159 lb 3.2 oz)    Pulse Oximetry BMI (Body Mass Index)                97% 23.51 kg/m2          [facility] Information     [facility] gives you secure access to your electronic health record. If you see a primary care provider, you can also send messages to your care team and make appointments. If you have questions, please call your primary care clinic.  If you do not have a primary care provider, please call [phone] and they will assist you.        Care EveryWhere ID     This is your Care EveryWhere ID. This could be used by other organizations to access your [facility] medical records  [id number]        Equal Access to Services     SARAH DUNN : Hadii karrie spiveyo Soshu, waaxda luqadaha, qaybta kaalmada bhargav saucedo . So [facility] [phone].    ATENCIÓN: Si habla español, tiene a cid disposición servicios gratuitos de asistencia lingüística. Llame al [phone].    We comply with applicable federal civil rights laws and [place] laws. We " do not discriminate on the basis of race, color, national origin, age, disability, sex, sexual orientation, or gender identity.               Review of your medicines      CONTINUE these medicines which have NOT CHANGED        Dose / Directions    amylase-lipase-protease 51402 UNITS Cpep   Commonly known as:  CREON   Used for:  Diarrhea, unspecified type, Peritoneal carcinomatosis (H), Cancer of sigmoid colon (H)        Dose:  2 capsule   Take 2 capsules (72,000 Units) by mouth 3 times daily (with meals) And 1 capsule with each snack tid.   Quantity:  240 capsule   Refills:  3       IRON SUPPLEMENT PO        Dose:  325 mg   Take 325 mg by mouth 2 times daily (with meals)   Refills:  0       LORazepam 0.5 MG tablet   Commonly known as:  ATIVAN   Used for:  Peritoneal carcinomatosis (H), Cancer of sigmoid colon (H)        Dose:  0.5 mg   Take 1 tablet (0.5 mg) by mouth every 4 hours as needed (Anxiety, Nausea/Vomiting or Sleep)   Quantity:  30 tablet   Refills:  5       MULTIVITAMIN & MINERAL PO        Dose:  1 tablet   Take 1 tablet by mouth daily   Refills:  0       omeprazole 20 MG CR capsule   Commonly known as:  priLOSEC   Used for:  Diarrhea, unspecified type        Dose:  20 mg   Take 1 capsule (20 mg) by mouth daily   Quantity:  30 capsule   Refills:  11       ondansetron 8 MG ODT tab   Commonly known as:  ZOFRAN-ODT   Used for:  Nausea        Dose:  8 mg   Take 1 tablet (8 mg) by mouth every 8 hours   Quantity:  60 tablet   Refills:  0       * oxyCODONE 10 MG 12 hr tablet   Commonly known as:  OXYCONTIN   Used for:  Peritoneal carcinomatosis (H), Cancer of sigmoid colon (H)        Dose:  20 mg   Take 2 tablets (20 mg) by mouth every 12 hours   Quantity:  120 tablet   Refills:  0       * oxyCODONE 5 MG capsule   Commonly known as:  OXY-IR   Used for:  Peritoneal carcinomatosis (H)        Dose:  5-10 mg   Take 1-2 capsules (5-10 mg) by mouth every 4 hours as needed for moderate to severe pain   Quantity:  100  capsule   Refills:  0       parenteral nutrition - PTA/DISCHARGE ORDER        Inject into the vein daily See medication history note from 2/20/18 for most recent TPN formula.   Refills:  0       Psyllium 51.7 % Pack   Commonly known as:  METAMUCIL FIBER   Used for:  Diarrhea, unspecified type        Dose:  1 packet   Take 1 packet by mouth 3 times daily   Quantity:  90 each   Refills:  3       * Notice:  This list has 2 medication(s) that are the same as other medications prescribed for you. Read the directions carefully, and ask your doctor or other care provider to review them with you.         Where to get your medicines      These medications were sent to [address] [address]     Phone:  [phone]     ondansetron 8 MG ODT tab                Protect others around you: Learn how to safely use, store and throw away your medicines at www.disposemymeds.org.             Medication List: This is a list of all your medications and when to take them. Check marks below indicate your daily home schedule. Keep this list as a reference.      Medications           Morning Afternoon Evening Bedtime As Needed    amylase-lipase-protease 44559 UNITS Cpep   Commonly known as:  CREON   Take 2 capsules (72,000 Units) by mouth 3 times daily (with meals) And 1 capsule with each snack tid.                                IRON SUPPLEMENT PO   Take 325 mg by mouth 2 times daily (with meals)                                LORazepam 0.5 MG tablet   Commonly known as:  ATIVAN   Take 1 tablet (0.5 mg) by mouth every 4 hours as needed (Anxiety, Nausea/Vomiting or Sleep)                                MULTIVITAMIN & MINERAL PO   Take 1 tablet by mouth daily                                omeprazole 20 MG CR capsule   Commonly known as:  priLOSEC   Take 1 capsule (20 mg) by mouth daily                                ondansetron 8 MG ODT tab    Commonly known as:  ZOFRAN-ODT   Take 1 tablet (8 mg) by mouth every 8 hours   Last time this was given:  8 mg on 3/20/2018  7:47 AM                                * oxyCODONE 10 MG 12 hr tablet   Commonly known as:  OXYCONTIN   Take 2 tablets (20 mg) by mouth every 12 hours   Last time this was given:  20 mg on 3/20/2018  7:48 AM                                * oxyCODONE 5 MG capsule   Commonly known as:  OXY-IR   Take 1-2 capsules (5-10 mg) by mouth every 4 hours as needed for moderate to severe pain                                parenteral nutrition - PTA/DISCHARGE ORDER   Inject into the vein daily See medication history note from 2/20/18 for most recent TPN formula.                                Psyllium 51.7 % Pack   Commonly known as:  METAMUCIL FIBER   Take 1 packet by mouth 3 times daily                                * Notice:  This list has 2 medication(s) that are the same as other medications prescribed for you. Read the directions carefully, and ask your doctor or other care provider to review them with you.

## 2018-03-18 NOTE — PLAN OF CARE
Problem: Patient Care Overview  Goal: Plan of Care/Patient Progress Review  Outcome: No Change  ADMISSIOM    Direct admit from ED this am via stretcher. C/o abdominal pain and nausea, no vomiting. Prn IV dilaudid given for pain with relief. UAL, voiding spontaneously with adequate urine volume. OVSS, afebrile. Double PICC line right upper arm. Needs tPa, notified #[id number]. MIVF at 125 cc/hr via PIV. Ileostomy with liquid stool, no gas noted. Bowel sound not audible. Pt went down to xray this at noon. NPO with some ice chips. Hand outs provided to pt and SO per unit protocol/routine. Oriented to room, care plan and call light. PLAN: To start TPN tonight, possible procedure tomorrow, pain management

## 2018-03-18 NOTE — ED NOTES
Pt arrived to the ER with c/o severe abdominal pain and nausea. Pt has hx of colon ca and is on chemo every two weeks currently. Pt states that he has mild to moderate abd pain but last night, pain got worse and oxycodone was minimally effective. Pt also reports that he had stent placed in the colon about a month ago. VSS and afebrile. Denies SOA and chest pain. Has an ileostomy.

## 2018-03-18 NOTE — IP AVS SNAPSHOT
[address] [address]    Phone:  [phone]                                       After Visit Summary   3/18/2018    Sebas Lopez    MRN: [id number]           After Visit Summary Signature Page     I have received my discharge instructions, and my questions have been answered. I have discussed any challenges I see with this plan with the nurse or doctor.    ..........................................................................................................................................  Patient/Patient Representative Signature      ..........................................................................................................................................  Patient Representative Print Name and Relationship to Patient    ..................................................               ................................................  Date                                            Time    ..........................................................................................................................................  Reviewed by Signature/Title    ...................................................              ..............................................  Date                                                            Time

## 2018-03-18 NOTE — ED NOTES
Received report and assumed care of patient; patient found resting in bed with SO at bedside.  No requests or needs noted by patient at this time.

## 2018-03-18 NOTE — CONSULTS
GASTROENTEROLOGY CONSULTATION      Date of Admission:  3/18/2018          ASSESSMENT AND RECOMMENDATIONS:   Assessment:  51 yo M with h/o chronic UC found to have obstructing metastatic sigmoid adenocarcinoma with mesenteric adenopathy s/p ex lap with diverting ileostomy 7/2017; subsequently developed pancolonic distention s/p sigmoid stent placement in 2/2018. He is admitted for worsening abdominal pain and unable to pass any mucus or fluid from the rectum. CT showed pancolonic dilation with transition point at sigmoid stent. Differentials include stent migration, stent occlusion by tumors invasion. INR 1.12     Recommendations  - Keep NPO after midnight.   - Plan for sigmoid stent adjustment in OR tomorrow.     GI will continue follow. Please page if any questions.     Thank you for involving us in this patient's care. Please do not hesitate to contact the GI service with any questions or concerns.     Pt care plan discussed with Dr. Rios, GI staff physician.    Fransisco Ramirez  -------------------------------------------------------------------------------------------------------------------          Chief Complaint:   We were asked by   of Dr. Hardin to evaluate this patient with pancolonic obstruction     History is obtained from the patient and the medical record.          History of Present Illness:   Sebas Lopez is a 51 yo M with h/o chronic UC found to have obstructing metastatic sigmoid adenocarcinoma with mesenteric adenopathy s/p ex lap with diverting ileostomy 7/2017; subsequently developed pancolonic distention s/p sigmoid stent placement in 2/2018. He is admitted for worsening abdominal pain and unable to pass any mucus or fluid from the rectum. Per patient, he has not able to pass any mucus for at least 2 days. Abdomen is more painful. CT showed pancolonic dilation with transition point at sigmoid stent. He denied nausea, vomiting, fever, chills. Has brownish stool in ileostomy bag.  Mild decreased appetite.             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      HERNIA REPAIR       INSERT PORT VASCULAR ACCESS Right 8/10/2017    Procedure: INSERT PORT VASCULAR ACCESS;  Single Lumen Right Chest Power Port;  Surgeon: Malena Andrew PA-C;  [facility]     LAPAROSCOPY DIAGNOSTIC (GENERAL) N/A 12/6/2017    Procedure: LAPAROSCOPY DIAGNOSTIC (GENERAL);  Diagnostic Laparoscopy, Exploratory Laparotomy Anesthesia Block ;  Surgeon: Rob Dudley MD;  Location: [facility]     LAPAROTOMY EXPLORATORY N/A 12/6/2017    Procedure: LAPAROTOMY EXPLORATORY;;  Surgeon: Rob Dudley MD;  Location: [facility]     ORTHOPEDIC SURGERY Left     HIP ARTHROPLASTY     PICC INSERTION Right 02/23/2018    5Fr DL BioFlo PICC, 44cm (3cm external) in the R basilic vein w/ tip in the SVC RA junction            Previous Endoscopy:     Results for orders placed or performed during the hospital encounter of 02/05/18   COLONOSCOPY   Result Value Ref Range    COLONOSCOPY        [address] [place] [address] [phone]     Endoscopy Department  _______________________________________________________________________________  Patient Name: Sebas Lopez            Procedure Date: 2/6/2018 4:21 PM  MRN: [id number]                       Account Number: [id number]  YOB: 1965              Admit Type: Inpatient  Age: 52                                Gender: Male  Note Status: Finalized                Attending MD: Guru Mar ,   Total Sedation Time:                    _______________________________________________________________________________     Procedure:           Colonoscopy  Indications:         Colonic stent placement                       52 year old white Â male with history of ulcerative                        colitis with a diagnosis of Stage IV Sigmoid                        Adenocarcinoma with peritoneal carcinomatosis admitted                        with generalized weakness, leukocytosis , hyponatremia,                        abdominal pain and nausea/vomiting with CT showing an                        obstructing sigmoid colonic mass with progression of                        diffuse colonic distention. Colonoscopy under fluroscopy                        with plans to perform palliative colonic stenting across                        the obstructing mass  Providers:           Alexis Nazario MD  Referring MD:        Rob Dudley MD  Medicines:           General Anesthesia  Complications:       No immediate complications.  _______________________________________________________________________________  Procedure:           Pre-Anesthesia Assessment:                       - Prior to the procedure, a History and Physical was                        performed, and patient medications and allergies were                        reviewed. The patient is competent. The risks and                         benefits of the procedure and the sedation opti ons and                        risks were discussed with the patient. All questions                        were answered and informed consent was obtained. Patient                        identification and proposed procedure were verified by                        the physician and the nurse in the pre-procedure area.                        Mental Status Examination: alert and oriented. Airway                        Examination: normal oropharyngeal airway and neck                        mobility. Respiratory Examination: clear to                        auscultation. CV Examination: normal. Prophylactic                        Antibiotics: The patient does not require prophylactic                        antibiotics. Prior Anticoagulants: The patient has taken                        no previous anticoagulant or antiplatelet agents. ASA                        Grade Assessment: III - A patient with severe systemic                        disease. After reviewing the risks and benefits, the                         patient was deemed in satisfactory condition to undergo                        the procedure. The anesthesia plan was to use general                        anesthesia. Immediately prior to administration of                        medications, the patient was re-assessed for adequacy to                        receive sedatives. The heart rate, respiratory rate,                        oxygen saturations, blood pressure, adequacy of                        pulmonary ventilation, and response to care were                        monitored throughout the procedure. The physical status                        of the patient was re-assessed after the procedure.                       After obtaining informed consent, the colonoscope was                        passed under direct vision. Throughout the procedure,                        the patient's blood pressure, pulse,  and oxygen                        saturations were monitored continuously. The Endoscope                         was introduced through the anus and advanced to the                        sigmoid colon to examine a mass. This was the intended                        extent. The colonoscopy was performed without                        difficulty. The patient tolerated the procedure well.                                                                                   Findings:       The perianal examination was normal.       The single channel upper endoscope was advanced to the level of the mass        lesion. A fungating and infiltrative completely obstructing large mass        was found in the sigmoid colon at 20 cm from the anal verge. The mass        was circumferential (involving 100% of the lumen circumference). No        bleeding was present. A 0.025inch Visiglide wire was advanced through a        9-12 mm stone extraction balloon catheter under fluoroscopic guidance        through the stenotic lumen. The catheter was advanced over the wire and        contrast was injected. The krissy notic area was 2 to 3 cm in length. A 25mm        by 10 cm Evolution colonic stent was placed under endoscopic and        fluoroscopic guidance. Liquid stool was seen flowing through the stent.        The proximal end of the stent was at 15 cm in the rectosigmoid postion.        The stent was in good position.                                                                                   Impression:          - Malignant completely obstructing tumor in the sigmoid                        colon consistent with adenocarcinoma and a 2- 3 cm                        stricture as visualized on CT scan                       - Uncomplicated placement of 25 mm by 10 cm Evolution                        colonic stent across the obstructing sigmoid mass with                        passage of foul smelling liquid stools  Recommendation:      -  [facility].                       - Patient has been warned of post procedure                        complications incl uding stent migration and perforation.                        He also understands that this procedure was palliative.                       - Inpatient panc-bili consult to follow patient                                                                                     ____________________  Guru Mar,   2/7/2018 8:11:46 AM    _____________________  Alexis Rios MD  Number of Addenda: 0    Note Initiated On: 2/6/2018 4:21 PM  Scope In:  Scope Out:              Social History:   Reviewed and edited as appropriate  Social History     Social History     Marital status: Single     Spouse name: N/A     Number of children: N/A     Years of education: N/A     Occupational History     Not on file.     Social History Main Topics     Smoking status: Never Smoker     Smokeless tobacco: Never Used     Alcohol use 3.0 oz/week     5 Cans of beer per week      Comment: none for last 4 months     Drug use: No     Sexual activity: Not on file     Other Topics Concern     Not on file     Social History Narrative            Family History:   Reviewed and edited as appropriate  No known history of gastrointestinal/liver disease or  gastrointestinal malignancies       Allergies:   Reviewed and edited as appropriate     Allergies   Allergen Reactions     Liquid Adhesive Rash     Dermabond, rash with pustules, occurred with abdominal surgery and port removal             Medications:     Current Facility-Administered Medications   Medication     lidocaine 1 % 1 mL     lidocaine (LMX4) kit     sodium chloride (PF) 0.9% PF flush 3 mL     sodium chloride (PF) 0.9% PF flush 3 mL     sodium chloride 0.9% infusion     LORazepam (ATIVAN) tablet 0.5 mg     ondansetron (ZOFRAN-ODT) ODT tab 8 mg     oxyCODONE (OxyCONTIN) 12 hr tablet 20 mg     Medication Instruction      "enoxaparin (LOVENOX) injection 40 mg     prochlorperazine (COMPAZINE) tablet 5-10 mg    Or     prochlorperazine (COMPAZINE) injection 5 mg     HYDROmorphone (DILAUDID) injection 1 mg     naloxone (NARCAN) injection 0.1-0.4 mg     pantoprazole (PROTONIX) 40 mg IV push injection     lipids (INTRALIPID) 20 % infusion 250 mL     dextrose 10 % 1,000 mL infusion     parenteral nutrition - ADULT compounded formula CYCLE             Review of Systems:   A complete review of systems was performed and is negative except as noted in the HPI           Physical Exam:   BP (!) 135/92 (BP Location: Right arm)  Pulse 111  Temp 97.3  F (36.3  C) (Oral)  Resp 18  Ht 5' 9\" (1.753 m)  Wt 155 lb 3.2 oz (70.4 kg)  SpO2 100%  BMI 22.92 kg/m2  Wt:   Wt Readings from Last 2 Encounters:   03/18/18 155 lb 3.2 oz (70.4 kg)   03/08/18 152 lb 8 oz (69.2 kg)      Constitutional: no acute distress  Eyes: Sclera anicteric/injected  Ears/nose/mouth/throat: Normal oropharynx without ulcers or exudate, mucus membranes moist, hearing intact  Neck: supple, thyroid normal size  CV: No edema  Respiratory: Unlabored breathing  Lymph: No axillary, submandibular, supraclavicular or inguinal lymphadenopathy  Abd: ileostomy bag in place with brown stool. Abdomen is distended with diffuse tenderness. No peritoneal signs.   Skin: warm, perfused, no jaundice  Neuro: AAO x 3, No asterixis  Psych: Normal affect  MSK: No gross deformities         Data:   Labs and imaging below were independently reviewed and interpreted    BMP  Recent Labs  Lab 03/18/18  0408      POTASSIUM 4.1   CHLORIDE 105   ANNAMARIE 8.9   CO2 25   BUN 20   CR 0.80   *     CBC  Recent Labs  Lab 03/18/18  0408   WBC 8.9   RBC 3.35*   HGB 9.4*   HCT 29.4*   MCV 88   MCH 28.1   MCHC 32.0   RDW 17.0*        INR  Recent Labs  Lab 03/18/18  1658   INR 1.12     LFTs  Recent Labs  Lab 03/18/18  0408   ALKPHOS 161*   AST 33   ALT 53   BILITOTAL 0.6   PROTTOTAL 7.6   ALBUMIN 2.8*    "   PANC  Recent Labs  Lab 03/18/18  0408   LIPASE 112       Imaging:  CT a/p with IV contrast 3/18/18:   The liver, gallbladder, adrenal glands, spleen, and pancreas appear  normal. There is symmetric nephrographic phase without hydronephrosis  or renal calculi. The urinary bladder appears normal, partially  obscured by metallic streak artifact.     There are postsurgical changes of end ileostomy in the right lower  quadrant as well as colonic stent placement in the sigmoid colon.  There is marked pancolonic dilatation as well as mild distention of  the terminal ileum. Anastomotic changes of the ileum (series 5 image  340). The stent appears similar in position to previous CT, however  the lumen of the stent is decreased in diameter from previous and  continuous patency with the rectum is not well seen. A sigmoid mass is  again noted which is not substantially changed in size from previous  CT. Small bowel loops are normal in caliber throughout. No pneumatosis  or portal venous gas. No intra-abdominal abscess.     Peritoneal soft tissue nodularity and omental caking in the upper  anterior abdomen and pelvis is not significantly changed from previous  CT. Small volume of abdominal free fluid. Scattered enlarged  mesenteric lymph nodes are not substantially changed from previous CT.  The major abdominal vasculature is patent. No abdominal free air.     Post surgical changes of left total hip arthroplasty and subchondral  cystic changes of the acetabulum. Chronic deformities of the left  lateral ribs.     Bibasilar atelectasis.       Impression:   1. Pan colonic distention concerning for distal large bowel  obstruction. Transition point likely related to sigmoid stent which is  more narrowed on this exam and appears less contiguous with the  rectum.   2. Stable peritoneal thickening and nodularity with small volume  abdominal free fluid likely related to peritoneal carcinomatosis.  3. Stable prominent mesenteric and  retroperitoneal lymph nodes which  may be metastatic or reactive.

## 2018-03-18 NOTE — PHARMACY
Patient will be resuming home TPN while inpatient. Formula from [facility] pharmacist Aileen Antunez as follow:     Volume 2,000 mL, Dosing weight 65 kg, Cycled over 12 hours overnight  AA (travasol 10%) 90 g/day, dex 240 g/day, Lipids 20% 250mL/day x 7 days/week  Na 135 meq/day, K 60 meq/day, Ca 2 meq/day, Mg 15 meq/day, Phosp 25 mM/day, Cl to AC of 1:1  Infuvite 10 mL/day, MTE 3 mL/day, and phytonadione 5 mg/day    Leonel Prajapati, BridgetteD

## 2018-03-18 NOTE — IP AVS SNAPSHOT
"    [address] [facility]: [phone]                                              INTERAGENCY TRANSFER FORM - PHYSICIAN ORDERS   3/18/2018                    Hospital Admission Date: 3/18/2018  KARINA MINER   : 1965  Sex: Male        Attending Provider: Kaden Hardin MD     Allergies:  Liquid Adhesive    Infection:  None   Service:  ONCOLOGY    Ht:  1.753 m (5' 9\")   Wt:  72.2 kg (159 lb 3.2 oz)   Admission Wt:  67.6 kg (149 lb)    BMI:  23.51 kg/m 2   BSA:  1.87 m 2            Patient PCP Information     Provider PCP Type    NGUYỄN HERNÁNDEZ MD General      ED Clinical Impression     Diagnosis Description Comment Added By Time Added    Large bowel obstruction [K56.609] Large bowel obstruction [K56.609]  Miquel Rivera MD 3/18/2018  7:29 AM    Malignant neoplasm of colon, unspecified part of colon (H) [C18.9] Malignant neoplasm of colon, unspecified part of colon (H) [C18.9]  Miquel Rivera MD 3/18/2018  7:29 AM    Peritoneal carcinomatosis (H) [C78.6, C80.1] Peritoneal carcinomatosis (H) [C78.6, C80.1]  Miquel Rivera MD 3/18/2018  7:29 AM      Hospital Problems as of 3/20/2018              Priority Class Noted POA    Large bowel obstruction Medium  2018 Yes      Non-Hospital Problems as of 3/20/2018              Priority Class Noted    Peritoneal carcinomatosis (H) Medium  8/3/2017    Cancer of sigmoid colon (H) Medium  8/3/2017    Iron deficiency anemia due to chronic blood loss Medium  2017    Diarrhea, unspecified type Medium  2017    Hypokalemia Medium  2017    Colon cancer (H) Medium  2018    Colitis presumed infectious Medium  2018    Bacteremia Medium  2018      Code Status History     Date Active Date Inactive Code Status Order ID Comments User Context    3/20/2018  1:29 PM  Full Code [id number]  Ericka Lentz PA-C Outpatient    3/18/2018 10:24 AM 3/20/2018  1:29 PM Full Code [id number]  Stephon Mason MD Inpatient    " 2/20/2018  4:47 PM 2/25/2018  3:48 PM Full Code [id number]  Ericka Lentz PA-C Inpatient    2/9/2018 11:18 AM 2/20/2018  4:47 PM Full Code [id number]  Es Dumont APRN CNP Outpatient    2/5/2018 11:19 AM 2/9/2018 11:18 AM Full Code [id number]  Es Dumont APRN CNP Inpatient    12/7/2017  2:12 PM 2/5/2018 11:19 AM Full Code [id number]  Freddie Tadeo MD Outpatient    12/6/2017 11:44 AM 12/7/2017  2:12 PM Full Code [id number]  Rob Dudley MD Inpatient         Medication Review      CONTINUE these medications which have NOT CHANGED        Dose / Directions Comments    amylase-lipase-protease 09998 UNITS Cpep   Commonly known as:  CREON   Used for:  Diarrhea, unspecified type, Peritoneal carcinomatosis (H), Cancer of sigmoid colon (H)        Dose:  2 capsule   Take 2 capsules (72,000 Units) by mouth 3 times daily (with meals) And 1 capsule with each snack tid.   Quantity:  240 capsule   Refills:  3    Mail to patient if not yet due to be filled.       IRON SUPPLEMENT PO        Dose:  325 mg   Take 325 mg by mouth 2 times daily (with meals)   Refills:  0        LORazepam 0.5 MG tablet   Commonly known as:  ATIVAN   Used for:  Peritoneal carcinomatosis (H), Cancer of sigmoid colon (H)        Dose:  0.5 mg   Take 1 tablet (0.5 mg) by mouth every 4 hours as needed (Anxiety, Nausea/Vomiting or Sleep)   Quantity:  30 tablet   Refills:  5        MULTIVITAMIN & MINERAL PO        Dose:  1 tablet   Take 1 tablet by mouth daily   Refills:  0        omeprazole 20 MG CR capsule   Commonly known as:  priLOSEC   Used for:  Diarrhea, unspecified type        Dose:  20 mg   Take 1 capsule (20 mg) by mouth daily   Quantity:  30 capsule   Refills:  11        ondansetron 8 MG ODT tab   Commonly known as:  ZOFRAN-ODT   Used for:  Nausea        Dose:  8 mg   Take 1 tablet (8 mg) by mouth every 8 hours   Quantity:  60 tablet   Refills:  0        * oxyCODONE 10 MG 12 hr tablet   Commonly known as:   OXYCONTIN   Used for:  Peritoneal carcinomatosis (H), Cancer of sigmoid colon (H)        Dose:  20 mg   Take 2 tablets (20 mg) by mouth every 12 hours   Quantity:  120 tablet   Refills:  0        * oxyCODONE 5 MG capsule   Commonly known as:  OXY-IR   Used for:  Peritoneal carcinomatosis (H)        Dose:  5-10 mg   Take 1-2 capsules (5-10 mg) by mouth every 4 hours as needed for moderate to severe pain   Quantity:  100 capsule   Refills:  0        parenteral nutrition - PTA/DISCHARGE ORDER        Inject into the vein daily See medication history note from 2/20/18 for most recent TPN formula.   Refills:  0        Psyllium 51.7 % Pack   Commonly known as:  METAMUCIL FIBER   Used for:  Diarrhea, unspecified type        Dose:  1 packet   Take 1 packet by mouth 3 times daily   Quantity:  90 each   Refills:  3        * Notice:  This list has 2 medication(s) that are the same as other medications prescribed for you. Read the directions carefully, and ask your doctor or other care provider to review them with you.            Summary of Visit     Reason for your hospital stay       You were here due to a blockage in your colon. You has a second stent placed to open the blockage.             After Care     Activity       Your activity upon discharge: activity as tolerated       Diet       Follow this diet upon discharge: Regular, continue normal TPN regimen             Referrals     Home care nursing referral       Please fax discharge orders to [facility]    Ph:  [phone]    Fax: [phone]    And fax discharge orders to [place], [place]    Ph: [phone]    Fax: [phone]    Skilled home care RN for resumption of home TPN administration via [facility].  [facility] cares per home care agency routine.    Skilled home care RN  to evaluate medication management, nutrition and hydration evaluation, endurance evaluation, and general status evaluation after discharge from the acute care hospital  setting.    Skilled home care RN to assist with evaluation of gastro intestinal status including review of ostomy education as needed.    Skilled home care RN to assist with MD team plans of care as outlined in discharge orders.             Your next 10 appointments already scheduled     Mar 22, 2018  6:30 AM CDT   [facility] with  [facility]   [facility] ([facility])    [address] [place] [address]  [address]  [facility] [address]   [phone]            Mar 22, 2018  7:00 AM CDT   (Arrive by 6:45 AM)   Return Visit with Linda Alves PA-C   [facility] ([facility])    [address] [place] [address]  [address]  [facility] [address]   [phone]            Mar 22, 2018  7:30 AM CDT   Infusion 240 with [facility] ONCOLOGY INFUSION, UC 13 ATC   [facility] ([facility])    [address] [place] [address]  [address]  [facility] [address]   [phone]            Mar 29, 2018  4:15 PM CDT   (Arrive by 4:00 PM)   Return Visit with Sukhdeep Mota MD   [facility] ([facility])    [address] [place] [address]  [address]  [facility] [address]   [phone]              Follow-Up Appointment Instructions     Future Labs/Procedures    Follow Up and recommended labs and tests     Comments:    Scheduled appointments are listed below. Please keep your appointment for Thursday.      Follow-Up Appointment Instructions     Follow Up and recommended labs and tests       Scheduled appointments are listed below. Please keep your appointment for Thursday.             Statement of Approval     Ordered          03/20/18 8249  I have reviewed and agree with all the recommendations and orders detailed in this document.  EFFECTIVE NOW     Approved and electronically signed by:  Ericka Lentz PA-C

## 2018-03-18 NOTE — CONSULTS
COLORECTAL SURGERY CONSULT  March 18, 2018      REASON FOR CONSULT: Large bowel obstruction in the setting of metastatic colon cancer.     ASSESSMENT: Sebas Lopez is a 52 year old male with metastatic sigmoid adenocarcinoma presenting with large bowel obstruction at the level of the stent. He is currently hemodynamically stable with no evidence of perforation.     PLAN:    - GI consult to consider revision of the stent.   - If stent placement is unsuccessful, would recommend IR consult for possible cecostomy tube placement.   - Diverting loop colostomy would be last resort. Surgery would be very challenging and morbid given diffuse carcinomatosis and previous surgeries.    - CRS will continue to follow, please call with questions.     Patient seen, findings and plan discussed with colorectal fellow, Dr. Bauer and staff, Dr. Tinajero.    ------------------------------------------------------------------------------------------    HISTORY PRESENTING ILLNESS: Sebas Lopez is a 52 year old male with history of chronic UC who was found to have obstructing metastatic sigmoid adenocarcinoma with mesenteric adenopathy s/p ex lap with diverting ileostomy 7/2017 at [facility]. Patient saw Dr. Dudley in clinic and subsequently underwent diagnostic laparoscopy converted to laparotomy with aborted HIPEC due to high PCI in 12/2017. Patient had large bowel obstruction and underwent sigmoid stent placement with GI in 2/2018. Patient had significant improvement following the stent placement. He reports that he was passing some mucus and fluid from his bottom. However, over the past two days he hasn't had any mucus or fluid from below. He also noticed increased abdominal distention and worsening abdominal pain. Currently he reports that pain is under control. Reports decreased appetite but no nausea or vomiting. No fevers or chills. Ileostomy output has decreased over the past two days, which he attributes to not eating or  drinking much.       REVIEW OF SYSTEMS: A 10 point ROS was negative except as noted above.     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      HERNIA REPAIR       INSERT PORT VASCULAR ACCESS Right 8/10/2017    Procedure: INSERT PORT VASCULAR ACCESS;  Single Lumen Right Chest Power Port;  Surgeon: Malena Andrew PA-C;  [facility]     LAPAROSCOPY DIAGNOSTIC (GENERAL) N/A 12/6/2017    Procedure: LAPAROSCOPY DIAGNOSTIC (GENERAL);  Diagnostic Laparoscopy, Exploratory Laparotomy Anesthesia Block ;  Surgeon: Rob Dudley MD;  Location: [facility] OR     LAPAROTOMY EXPLORATORY N/A 12/6/2017    Procedure: LAPAROTOMY EXPLORATORY;;  Surgeon: Rob Dudley MD;  Location: [facility] OR     ORTHOPEDIC SURGERY Left     HIP ARTHROPLASTY     PICC INSERTION Right 02/23/2018    5Fr DL BioFlo PICC, 44cm (3cm external) in the R basilic vein w/ tip in the SVC RA junction       SOCIAL HISTORY:   Social History     Social History     Marital status: Single     Spouse name: N/A     Number of children: N/A     Years of education: N/A     Occupational History     Not on file.      Social History Main Topics     Smoking status: Never Smoker     Smokeless tobacco: Never Used     Alcohol use 3.0 oz/week     5 Cans of beer per week      Comment: none for last 4 months     Drug use: No     Sexual activity: Not on file     Other Topics Concern     Not on file     Social History Narrative       FAMILY HISTORY:  Family History   Problem Relation Age of Onset     Hypertension Father      DIABETES Father        ALLERGIES:      Allergies   Allergen Reactions     Liquid Adhesive Rash     Dermabond, rash with pustules, occurred with abdominal surgery and port removal        MEDICATIONS:    No current facility-administered medications on file prior to encounter.   Current Outpatient Prescriptions on File Prior to Encounter:  oxyCODONE (OXY-IR) 5 MG capsule Take 1-2 capsules (5-10 mg) by mouth every 4 hours as needed for moderate to severe pain   OXYCODONE HCL PO Take 5 mg by mouth every 24 hours   parenteral nutrition - PTA/DISCHARGE ORDER Inject into the vein daily See medication history note from 2/20/18 for most recent TPN formula.   oxyCODONE (OXYCONTIN) 10 MG 12 hr tablet Take 2 tablets (20 mg) by mouth every 12 hours   ondansetron (ZOFRAN-ODT) 8 MG ODT tab Take 1 tablet (8 mg) by mouth every 8 hours   omeprazole (PRILOSEC) 20 MG CR capsule Take 1 capsule (20 mg) by mouth daily   opium tincture tincture Take 0.6 mLs (6 mg) by mouth 4 times daily   LORazepam (ATIVAN) 0.5 MG tablet Take 1 tablet (0.5 mg) by mouth every 4 hours as needed (Anxiety, Nausea/Vomiting or Sleep)   Ferrous Sulfate (IRON SUPPLEMENT PO) Take 325 mg by mouth 2 times daily (with meals)    diphenoxylate-atropine (LOMOTIL) 2.5-0.025 MG per tablet Take 1-2 tablets by mouth 4 times daily as needed for diarrhea   amylase-lipase-protease (CREON) 76969 UNITS CPEP Take 2 capsules (72,000 Units) by mouth 3 times daily (with meals) And 1 capsule with each snack tid.   Psyllium (METAMUCIL FIBER) 51.7 % PACK Take 1 packet by mouth 3 times  daily       PHYSICAL EXAMINATION:  Temp:  [97.8  F (36.6  C)-98.4  F (36.9  C)] 97.8  F (36.6  C)  Pulse:  [102-105] 105  Resp:  [18] 18  BP: (118-137)/(78-95) 123/85  SpO2:  [97 %-100 %] 98 %  General: Alert, well-appearing in no acute distress.  HEENT: Normocephalic, atraumatic. Patent nares.   Respiratory: Non-labored breathing or room air.   Cardiovascular: Regular rate and rhythm.   Gastrointestinal: Abdomen soft, distended, non-tender to palpation. No organomegaly or masses appreciated.   Extremities: Moving all four extremities.   Skin: As noted above. No rashes or lesions appreciated.    LABS: Reviewed.   Complete Blood Count     Recent Labs  Lab 03/18/18  0408   WBC 8.9   HGB 9.4*        Basic Metabolic Panel    Recent Labs  Lab 03/18/18  0408      POTASSIUM 4.1   CHLORIDE 105   CO2 25   BUN 20   CR 0.80   *     Liver Function Tests    Recent Labs  Lab 03/18/18  0408   AST 33   ALT 53   ALKPHOS 161*   BILITOTAL 0.6   ALBUMIN 2.8*     Pancreatic Enzymes    Recent Labs  Lab 03/18/18  0408   LIPASE 112         IMAGING:  Results for orders placed or performed during the hospital encounter of 03/18/18   CT Abdomen Pelvis w Contrast     Value    Radiologist flags Large bowel obstruction (Urgent)    Narrative    EXAM: CT ABDOMEN PELVIS W CONTRAST 3/18/2018 6:39 AM     History: Pain, colon cancer, evaluate for obstruction    Comparison: CT of the abdomen and pelvis 2/20/2018    Technique: CT of the abdomen and pelvis were obtained with IV  contrast. Sagittal and coronal reconstructions created and reviewed.    Contrast: 92 mL Isovue-370    Findings:   The liver, gallbladder, adrenal glands, spleen, and pancreas appear  normal. There is symmetric nephrographic phase without hydronephrosis  or renal calculi. The urinary bladder appears normal, partially  obscured by metallic streak artifact.    There are postsurgical changes of end ileostomy in the right lower  quadrant as well as colonic stent  placement in the sigmoid colon.  There is marked pancolonic dilatation as well as mild distention of  the terminal ileum. Anastomotic changes of the ileum (series 5 image  340). The stent appears similar in position to previous CT, however  the lumen of the stent is decreased in diameter from previous and  continuous patency with the rectum is not well seen. A sigmoid mass is  again noted which is not substantially changed in size from previous  CT. Small bowel loops are normal in caliber throughout. No pneumatosis  or portal venous gas. No intra-abdominal abscess.    Peritoneal soft tissue nodularity and omental caking in the upper  anterior abdomen and pelvis is not significantly changed from previous  CT. Small volume of abdominal free fluid. Scattered enlarged  mesenteric lymph nodes are not substantially changed from previous CT.  The major abdominal vasculature is patent. No abdominal free air.    Post surgical changes of left total hip arthroplasty and subchondral  cystic changes of the acetabulum. Chronic deformities of the left  lateral ribs.    Bibasilar atelectasis.      Impression    Impression:   1. Pan colonic distention concerning for distal large bowel  obstruction. Transition point likely related to sigmoid stent which is  more narrowed on this exam and appears less contiguous with the  rectum.   2. Stable peritoneal thickening and nodularity with small volume  abdominal free fluid likely related to peritoneal carcinomatosis.  3. Stable prominent mesenteric and retroperitoneal lymph nodes which  may be metastatic or reactive.    [Urgent Result: Large bowel obstruction]    Finding was identified on 3/18/2018 6:39 AM.     Dr. Rowe was contacted by Dr. Dr. Joseph at 3/18/2018 6:44 AM and  verbalized understanding of the urgent finding.     I have personally reviewed the examination and initial interpretation  and I agree with the findings.    ZEUS CROSS MD ([place])         CO-MORBIDITIES:   Large  bowel obstruction  Malignant neoplasm of colon, unspecified part of colon (H)  Peritoneal carcinomatosis (H)      Shameka Allred MD  General Surgery, PGY-2  [phone]

## 2018-03-18 NOTE — H&P
Hematology/Oncology H&P  March 18, 2018     Patient Summary: Sebas Lopez is a 51 y/o man with metastatic colorectal cancer, currently treated with FOLFOX (C7D1 on 3/8), admitted on 3/18/18 with large bowel obstruction.     Assessment/Plan:  Large bowel obstruction: this is a recurrent problem from sigmoid mass and peritoneal carcinomatosis. He has end ileostomy and is on tpn. Had colonic stent placed on 2/6/18. per CT scan today he has pan colonic distension with transition point near the sigmoid stent. He has an ileostomy so an NG tube will not be helpful. Pain is reasonably controlled with IV Dilaudid. Lactate normal, hemodynamically stable. Evaluated by colorectal surgery. Treating conservatively for now. A surgical option is placing colostomy proximal to the obstruction but with his peritoneal disease he could still develop bowel obstructions at other sites.     - discussed with GI fellow and plan is for colonic stent revision tomorrow in the OR   - npo, IVF, and IV pain control  - appreciate GI and colorectal surgery recommendations    Metastatic colorectal cancer: he presented with obstructing sigmoid mass in May 2017. He had diffuse peritoneal carcinomatosis and had exploratory laparotomy on 07/10/2017 with end ileostomy. He again had colonic obstruction in January 2017, had stent placed on 2/6/18. follows with Dr. Long. Currently on FOLFOX, with day 1 cycle 7 on 3/8.     Anemia: near baseline of 8-9. Hold ferrous sulfate. Transfuse for Hb <7     FEN: reorder home nightly TPN  Ppx: Lovenox 40 mg daily   Code: joseph Mason  Heme/Onc Fellow  Pager: [phone]  March 18, 2018    CC: LLQ pain   HPI: Sebas developed LLQ pain two days ago. Gradually worsened over last 24 hrs. No n/v. He is drinking small amounts of fluid. Ileostomy is draining small amount of green liquid. Had small amount of output from rectum. No fever or chills.   ROS: complete 10-pt ROS negative unless noted in HPI  PMH: metastatic CRC,  "anemia, UC  Social Hx: never smoker, has significant other   Fam hx: reviewed and non-contributory     Physical Exam:   Blood pressure 132/90, pulse 102, temperature 98.4  F (36.9  C), temperature source Oral, resp. rate 18, height 1.753 m (5' 9\"), weight 67.6 kg (149 lb), SpO2 100 %.  General: sitting up in bed, intermittently in pain   Eyes: LIBERTAD, sclera anicteric   Nose/Mouth/Throat: OP clear, buccal mucosa moist, no ulcerations   Lungs: CTA bilaterally  Cardiovascular: RRR, no M/R/G   Abdominal/Rectal: +BS, slightly distended, ttp in LLQ with deep palpation, no rebound tenderness  Lymphatics: no edema  Skin: no rashes or petechaie  Neuro: A&O     Labs:  Lab Results   Component Value Date    WBC 8.9 03/18/2018    ANEU 5.0 03/18/2018    HGB 9.4 (L) 03/18/2018    HCT 29.4 (L) 03/18/2018     03/18/2018     03/18/2018    POTASSIUM 4.1 03/18/2018    CHLORIDE 105 03/18/2018    CO2 25 03/18/2018     (H) 03/18/2018    BUN 20 03/18/2018    CR 0.80 03/18/2018    MAG 1.9 03/08/2018    INR 1.27 (H) 02/21/2018       Imaging: Reviewed      "

## 2018-03-18 NOTE — PROGRESS NOTES
CLINICAL NUTRITION SERVICES - ASSESSMENT NOTE     Nutrition Prescription    RECOMMENDATIONS FOR MDs/PROVIDERS TO ORDER:  1. Adjust IVF when TPN starts tonight per provider discretion    2. Once/if diet advances, order calorie counts to determine ability to wean or adjust PN provisions    Malnutrition Status:    Non-severe malnutrition in the context of chronic illness    Recommendations already ordered by Registered Dietitian (RD):  1. Resume pt's home TPN: 2000 mL/day x 12 hours with 240 g dextrose, 90 g AA, and 250 mL 20% IV lipids daily which provides 1676 kcal (25 kcal/kg), 1.3 g/kg PRO, GIR 5 @ peak infusion, and 30% kcal from fat per dosing weight 67 kg.    2. TG level    Future/Additional Recommendations:  Monitor LFTs and TGs weekly while on PN to monitor need to adjust provisions     REASON FOR ASSESSMENT  Sebas Lopez is a/an 52 year old male assessed by the dietitian for Admission Nutrition Risk Screen for reduced oral intake over the last month and tube feeding or parenteral nutrition and Pharmacy/Nutrition to Start and Manage PN    NUTRITION HISTORY  Obtained information from patient and chart.  Pt last ran TPN last night, and says he has been running TPN every day consistently without issues.  Reports poor PO intakes due to nausea for about 1 week since recent chemo infusion.  Prior to one week was eating at baseline, uses [employer] [facility] patricia to track how much he is eating; when appetite is good, eats ~2500 kcals/day and anywhere from ~ g protein. Drinks Boost/Ensure regularly.    Obtained home CPN regimen from  [facility]: 2000 mL/day x 12 hours with 240 g dextrose, 90 g AA, and 250 mL 20% IV lipids daily which provides 1676 kcal (26 kcal/kg), 1.4 g/kg PRO, GIR 5.1 @ peak infusion, and 30% kcal from fat per [facility] dosing weight 65 kg.    CURRENT NUTRITION ORDERS  Diet: NPO  Intake/Tolerance: NPO and no TPN since admit last night    LABS  Labs reviewed  - K+ WNL  - Alk Phos 161 (H); Tbili,  "ALT, and AST WNL  - TG 57 (WNL) when last checked on 3/6    MEDICATIONS  Medications reviewed  - IVF  @ 125 mL/hr    ANTHROPOMETRICS  Height: 175.3 cm (5' 9\")  Most Recent Weight: 70.4 kg (155 lb 3.2 oz)    IBW: 72.7 kg   BMI: Normal BMI  Weight History: Wt trending up over the past 1-2 months.  Pt thinks his weight is currently up with fluids and has been fluctuating recently, but pt thinks his recent most consistent body weight has been ~148# (67.2 kg) over the past 1-2 weeks.  Most recent [facility] weight was 148# on 2/28 (pt reported improving edema that day).  Most recent [facility] Records have been using 65 kg as dosing weight.  Wt Readings from Last 10 Encounters:   03/18/18 70.4 kg (155 lb 3.2 oz)   03/08/18 69.2 kg (152 lb 8 oz)   03/01/18 68.5 kg (151 lb 1.6 oz)   02/25/18 69.1 kg (152 lb 6.4 oz)   02/15/18 61.6 kg (135 lb 11.2 oz)   02/09/18 64.1 kg (141 lb 4.8 oz)   02/05/18 66.5 kg (146 lb 11.2 oz)   02/01/18 64.9 kg (143 lb)   01/31/18 65.3 kg (143 lb 14.4 oz)   01/29/18 64.5 kg (142 lb 3.2 oz)     Dosing Weight: 67 kg (actual/pt's reported lowest recent wt)    ASSESSED NUTRITION NEEDS  Estimated Energy Needs: 4468-2356-0586 kcals/day (25 - 30 - 35 kcals/kg)  Justification: Higher end due to increased needs with chemo, aim lower end w/ PN kcals to help prevent overfeeding liver  Estimated Protein Needs:  grams protein/day (1.2 - 1.5 grams of pro/kg)  Justification: Hypercatabolism with acute illness and Repletion  Estimated Fluid Needs: (1 mL/kcal)   Justification: Maintenance and Per provider pending fluid status    PHYSICAL FINDINGS  See malnutrition section below.    MALNUTRITION  % Intake: No decreased intake noted  % Weight Loss: None noted/difficult to determine weight trends due to likely fluid related wt fluctuations  Subcutaneous Fat Loss: Facial region:  mild  Muscle Loss: Temporal and Facial & jaw region: moderate  Fluid Accumulation/Edema: None noted per provider note " yesterday (pt does not personally notice any edema either)  Malnutrition Diagnosis: Non-severe malnutrition in the context of chronic illness    NUTRITION DIAGNOSIS  Inadequate oral intake related to nausea and intermittent appetite changes with chemo as evidenced by NPO x 1 day since admit, pt reports poor PO intake x 1 week 2/2 nausea, and ongoing TPN support 2/2 hx of  bowel obstruction     INTERVENTIONS  Implementation  Nutrition Education: Provided education on role of RD and nutrition POC.  Provided supplements list per patient request so he knows which supplements we have once he is able to start eating again.   Collaboration with other providers and Parenteral Nutrition/IV Fluids - Per [facility] PharmD, pt gets Vitamin K added to PN, discussed this with unit PharmD and forwarded pt's home PN regimen from [facility] to unit PharmD. Sent change order request to initiate TPN (See above). Paged team with above recs regarding PN/IV fluids    Goals  Total avg nutritional intake to meet a minimum of 25 kcal/kg and 1.2 g PRO/kg daily (per dosing wt 67 kg).     Monitoring/Evaluation  Progress toward goals will be monitored and evaluated per protocol.     Suha Lee, RD, LD  Weekend/Holiday RD Pager: [phone]    7C Weekday RD pager: [phone]

## 2018-03-19 NOTE — PLAN OF CARE
Problem: Patient Care Overview  Goal: Plan of Care/Patient Progress Review  Outcome: No Change  Patient is requesting pain medications appx every hour, states his left abdomen is cramping, dilaudid helps for short term, is also getting scheduled OxyContin. TPN and lipids started this evening via PICC line, patient does TPN at home.  Heart rate and BP slightly elevated but under notify parameters. Patient manages own ileostomy bag, has liquid stool and gas. Small soft white aleena-like object noticed in bag, possibly a medication, patient states MD's are aware of it. Patient remains NPO for stent procedure tomorrow. Continue to manage patients pain.

## 2018-03-19 NOTE — PROGRESS NOTES
Care Coordinator- Discharge Planning     Admission Date/Time:  3/18/2018  Attending MD:  Kaden Hardin, *     Data  Date of initial CC assessment:  Today after chart review and discussion with non MD interdisciplinary team members.  MD team update pending at this time.  Chart reviewed, discussed with interdisciplinary team.   Patient was admitted for:   1. Large bowel obstruction    2. Malignant neoplasm of colon, unspecified part of colon (H)    3. Peritoneal carcinomatosis (H)    4. Cancer of sigmoid colon (H)    5. 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  Prior to admission to the acute [facility] hospital setting, Mr Lopez was being followed by home care services.  [facility] was supplying TPN for [facility] in [place].  In light of the above, home care agencies have been resumed and the following tentative plans of discharge have been resumed and will be updated once a final discharge plan of care is known:    Please fax discharge orders to [facility]     Ph:       Fax: [phone]     And fax discharge orders to [place], [place]     Ph: [phone]     Fax: [phone]     Skilled home care RN for resumption of home TPN administration via [facility].  [facility] cares per home care agency routine.     Skilled home care RN  to evaluate medication management, nutrition and hydration evaluation, endurance evaluation, and general status evaluation after discharge from the acute [facility] hospital setting.     Skilled home care RN to assist with evaluation of gastro intestinal status including review of ostomy education as needed.     Skilled home care RN to assist with MD team plans of care as outlined in discharge orders.     Coordination of Care and Referrals: Provided patient/family with options for resumption of home care agencies of choice.       Plan  Anticipated Discharge Date:  To be determined.  Anticipated Discharge Plan:  As above and per MD team updated plans of care closer to day of discharge.  Inpatient Care Management RN will continue to follow and will assist with updated transition needs prn.    CTS Handoff completed: ( Clinic Letter)  To be sent.    NYDIA Murillo.S.[email or web address]., P.H.N.,R.N.         Pager

## 2018-03-19 NOTE — PROGRESS NOTES
This is a recent snapshot of the patient's [facility] medical record.  For current drug dose and complete information and questions, call [phone] or In Basket pool, [facility] ([id number])  CSN Number:  [id number]

## 2018-03-19 NOTE — OP NOTE
Flex Sig 03/19/2018 11:32 AM [facility], [address] [place] [address] [phone]     Endoscopy Department   _______________________________________________________________________________   Patient Name: Sebas Lopez            Procedure Date: 3/19/2018 11:32 AM   MRN: [id number]                       Account Number: [id number]   YOB: 1965              Admit Type: Inpatient   Age: 52                               Room: OR   Gender: Male                          Note Status: Finalized   Attending MD: Alexis Rios MD   Total Sedation Time:   _______________________________________________________________________________       Procedure:           Flexible Sigmoidoscopy   Indications:         For therapy of cancer in the sigmoid colon   Providers:           Alexis Rios MD   Patient Profile:     Mr Lopez is a 51yo gentleman with 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 He fared well since the placement of a sigmoid                        stent months back and it is noted he has an ileostomy.                        He now proceeds to further management by sigmoidoscopy.   Referring MD:        Rob Dudley MD   Medicines:           General Anesthesia   Complications:       No immediate complications.   _______________________________________________________________________________   Procedure:           Pre-Anesthesia Assessment:                        - Prior to the procedure, a History and Physical was                        performed, and patient medications and allergies were                        reviewed. The patient is competent. The risks and                        benefits of the procedure and the sedation  options and                        risks were discussed with the patient. All questions                        were answered and informed consent was obtained. Patient                        identification and proposed procedure were verified by                        the nurse in the pre-procedure area. Mental Status                        Examination: alert and oriented. Airway Examination:                        Mallampati Class II (the uvula but not tonsillar pillars                        visualized). Respiratory Examination: clear to                        auscultation. CV Examination: normal. ASA Grade                        Assessment: III - A patient with severe systemic                        disease. After reviewing the risks and benefits, the                        patient was deemed in satisfactory condition to undergo                        the procedure. The anesthesia plan was to use general                        anesthesia. Immediately prior to administration of                        medications, the patient was re-assessed for adequacy to                        receive sedatives. The heart rate, respiratory rate,                        oxygen saturations, blood pressure, adequacy of                        pulmonary ventilation, and response to care were                        monitored throughout the procedure. The physical status                        of the patient was re-assessed after the procedure.                        After obtaining informed consent, the scope was passed                        under direct vision. The 1T gastrosocpe was introduced                        through the anus and advanced to the sigmoid colon.                        After obtaining informed consent, the scope was passed                        under direct vision.The flexible sigmoidoscopy was                        accomplished without difficulty. The patient tolerated                        the procedure  "well. The quality of the bowel preparation                        was adequate.                                                                                     Findings:        Fluoroscopy was utilized throughout.  film demonstrated the        ileostomy device and the previously placed sigmoid stent, the latter        well expanded. Digital examination was unremarkable. The 1T gastroscope        was passed and the distal end of the in situ stent was found just        upstream of an unremarkable rectum. There was definite tissue and tumor        in growth within the stent and the caliber was narrowed to approximately        10mm. The proximal end of the stent was widely exanded in a distended        segment of the sigmoid. Fluroscopy suggested diffuse upstream distention        and there was a copious amount of thick whitish colored liquid which was        mostly removed by suction. Along 0.025\" Visiglide wire and 12mm        occlusion balloon were passed and the wire knuckled well above the in        situ stent. Over the wire a second Jens [id number] was deployed under        fluoroscopic guidance with proximal end within the body of the first        stent and the distal end just beyond the distal of the first to        communicate with the rectum. The distal end was just entering the        rectum. The stent was then post dilated to 20mm.                                                                                     Impression:          - Well positioned, well expanded in situ sigmoid stent                        with tissue and tumor ingrowth managed by placement of a                        second colonic stent, stent in stent fashion, though                        just slightly downstream to optimize communication with                        the rectum   Recommendation:      - Standard inpatient general anesthesia recovery with                        return to the floor when appropriate               "          - Defer oral diet until such time as symptoms improve                        and then proceed with slow progression from liquids to                        solids                        - Diffuse colonic dysmotility remains a possibility                        given the particular findings on imaging and endoscopy                        - Contact our team with stent related issues                        - The findings and recommendations were discussed with                        the patient and their family                                                                                       electronically signed by ISA Rios

## 2018-03-19 NOTE — ANESTHESIA CARE TRANSFER NOTE
Patient: Sebas Lopez    Procedure(s):  flexible sigmoidoscopy with stent placement and dilation - Wound Class: II-Clean Contaminated    Diagnosis: colonic obstruction  Diagnosis Additional Information: No value filed.    Anesthesia Type:   General, ETT     Note:  Airway :Face Mask  Patient transferred to:PACU  Comments: To PACU, VSS, pt awake and alert, exchanging well, report to RN, care accepted.Handoff Report: Identifed the Patient, Identified the Reponsible Provider, Reviewed the pertinent medical history, Discussed the surgical course, Reviewed Intra-OP anesthesia mangement and issues during anesthesia, Set expectations for post-procedure period and Allowed opportunity for questions and acknowledgement of understanding      Vitals: (Last set prior to Anesthesia Care Transfer)    CRNA VITALS  3/19/2018 1203 - 3/19/2018 1238      3/19/2018             Pulse: 106    SpO2: 100 %                Electronically Signed By: ALEXA Pappas CRNA  March 19, 2018  12:38 PM

## 2018-03-19 NOTE — ANESTHESIA PREPROCEDURE EVALUATION
Sebas Lopez is a 52 year old male with a PMH of  Large bowel obstruction [K56.609];Peritoneal carcinomatosis* who is scheduled for Procedure(s):  colonoscopy - Wound Class: II-Clean Contaminated    NPO Status: Adequate.  > 6 hours solids, > 2 hours clear liquids.       Past Surgical History:   Procedure Laterality Date     COLONOSCOPY  2017     COLONOSCOPY N/A 11/30/2017    Procedure: COLONOSCOPY;  Colonoscopy;  Surgeon: Rob Dudley MD;  Location: [facility]     COLONOSCOPY N/A 2/6/2018    Procedure: COMBINED COLONOSCOPY, STENT PLACEMENT;  COMBINED COLONOSCOPY with Colonic Stent Placement;  Surgeon: Guru Lionel Mar MD;  Location: [facility] OR     GI SURGERY  07/10/2017    Extensive lysis of adhesions, small bowel resection with end ileostomy.      HERNIA REPAIR       INSERT PORT VASCULAR ACCESS Right 8/10/2017    Procedure: INSERT PORT VASCULAR ACCESS;  Single Lumen Right Chest Power Port;  Surgeon: Malena Andrew PA-C;  [facility]     LAPAROSCOPY DIAGNOSTIC (GENERAL) N/A 12/6/2017    Procedure: LAPAROSCOPY DIAGNOSTIC (GENERAL);  Diagnostic Laparoscopy, Exploratory Laparotomy Anesthesia Block ;  Surgeon: Rob Dudley MD;  Location: [facility] OR     LAPAROTOMY EXPLORATORY N/A 12/6/2017    Procedure: LAPAROTOMY EXPLORATORY;;  Surgeon: Rob Dudley MD;  Location: [facility] OR     ORTHOPEDIC SURGERY Left     HIP ARTHROPLASTY     PICC INSERTION Right 02/23/2018    5Fr DL BioFlo PICC, 44cm (3cm external) in the R basilic vein w/ tip in the SVC RA junction       Anesthesia Evaluation     .             ROS/MED HX    ENT/Pulmonary:     (+)NADIA risk factors observed stopped breathing , . .   (-) asthma and COPD   Neurologic:  - neg neurologic ROS     Cardiovascular: Comment: H/O Lyme disease (1990's) requiring pacing x1 day -- resolved.    (+) ----. : . . . :. . Previous cardiac testing date:results:date: results:ECG reviewed date: results: date: results:          METS/Exercise  Tolerance:  4 - Raking leaves, gardening   Hematologic:     (+) Anemia, -      Musculoskeletal:   (+) , , other musculoskeletal- H/o [id number]-Kmnsboj, s/p L total hip replacement      GI/Hepatic:     (+) GERD Other GI/Hepatic H/o ulcerative colitis x ~20 years, recently dx'd stage 4 sigmoid adenocarcinoma with peritoneal carcinomatosis.  S/p 6 cycles of FOLFOX.         Renal/Genitourinary:         Endo:  - neg endo ROS       Psychiatric:     (+) psychiatric history anxiety      Infectious Disease:  - neg infectious disease ROS       Malignancy:   (+) Malignancy History of Other  Other CA Metastatic sigmoid adenomaCA Active status post Chemo and Surgery         Other:                     Physical Exam  Normal systems: cardiovascular, pulmonary and dental    Airway   Mallampati: I  TM distance: >3 FB  Neck ROM: full    Dental     Cardiovascular   Rhythm and rate: regular and normal      Pulmonary    breath sounds clear to auscultation                        Anesthesia Plan      History & Physical Review  History and physical reviewed and following examination; no interval change.    ASA Status:  3 .    NPO Status:  > 8 hours    Plan for General and ETT with Intravenous and Propofol induction. Maintenance will be Balanced.    PONV prophylaxis:  Ondansetron (or other 5HT-3) and Dexamethasone or Solumedrol       Postoperative Care  Postoperative pain management:  IV analgesics.      Consents  Anesthetic plan, risks, benefits and alternatives discussed with:  Patient..        Trent Melo MD  10:49 AM March 19, 2018                       .

## 2018-03-19 NOTE — PROGRESS NOTES
Subjective:  No acute issues overnight, pain controlled, patient reports doing okay awaiting for GI to place a stent this morning.    Objective:  Temp: 97.9  F (36.6  C) Temp src: Oral BP: 137/85 Pulse: 101   Resp: 16 SpO2: 98 % O2 Device: None (Room air)      General: He is alert and oriented, no distress  Abdomen: Distended, tender to palpation, no guarding, no rebound, ileostomy looks viable, with minimum gas in the bag.    Intake/Output Summary (Last 24 hours) at 18 1028  Last data filed at 18 0833   Gross per 24 hour   Intake          1152.26 ml   Output             1315 ml   Net          -162.74 ml     Assessment and plan:  52-year-old male with metastatic sigmoid adenocarcinoma who is status post palliative end ileostomy and sigmoid stent placement 2018, now presents with abdominal pain and distention, imaging shows colonic dilation with likely stent occlusion.    No indication for surgery from colorectal standpoint.  GI to place a sigmoid stent today, if that fails, we recommend placing a venting G-tube.  Colorectal surgery will sign off at this time, please call us with questions and concerns thank you.    Sushil Pérez MD (PGY1)  General Surgery  [phone]

## 2018-03-19 NOTE — PHARMACY-ADMISSION MEDICATION HISTORY
Admission medication history interview status for the 3/18/2018 admission is complete. See Epic admission navigator for allergy information, pharmacy, prior to admission medications and immunization status.     Medication history interview sources:  Self    Changes made to PTA medication list (reason)  Added: Multivitamin  Deleted: Lomotil (used months ago), opium tincture (used months ago), oxycodone (duplicate)  Changed: None    Additional medication history information (including reliability of information, actions taken by pharmacist):  - Patient was a good historian and was able to recall all medications, doses, and directions  - Patient has never had a flu shot and is not interested in one today  - Patient has not taken most medications for 4-5 days due to abdominal pain and nausea        Prior to Admission medications    Medication Sig Last Dose Taking? Auth Provider   oxyCODONE (OXY-IR) 5 MG capsule Take 1-2 capsules (5-10 mg) by mouth every 4 hours as needed for moderate to severe pain 3/18/2018 at 0230 Yes Albert Long MD   parenteral nutrition - PTA/DISCHARGE ORDER Inject into the vein daily See medication history note from 2/20/18 for most recent TPN formula. 3/17/2018 at 2000 Yes Unknown, Entered By History   oxyCODONE (OXYCONTIN) 10 MG 12 hr tablet Take 2 tablets (20 mg) by mouth every 12 hours 3/17/2018 at 2100 Yes Albert Long MD   ondansetron (ZOFRAN-ODT) 8 MG ODT tab Take 1 tablet (8 mg) by mouth every 8 hours 3/17/2018 at 0400 Yes Es Dumont APRN CNP   omeprazole (PRILOSEC) 20 MG CR capsule Take 1 capsule (20 mg) by mouth daily Past Week at 0800 Yes Sukhdeep Mota MD   amylase-lipase-protease (CREON) 56280 UNITS CPEP Take 2 capsules (72,000 Units) by mouth 3 times daily (with meals) And 1 capsule with each snack tid. Past Week at Unknown time Yes Linda Alves PA-C   LORazepam (ATIVAN) 0.5 MG tablet Take 1 tablet (0.5 mg) by mouth every 4 hours as needed (Anxiety,  Nausea/Vomiting or Sleep) 3/16/2018 Yes Linda Alves PA-C   Ferrous Sulfate (IRON SUPPLEMENT PO) Take 325 mg by mouth 2 times daily (with meals)  Past Week at 0800 Yes Reported, Patient   Psyllium (METAMUCIL FIBER) 51.7 % PACK Take 1 packet by mouth 3 times daily Past Week at Unknown time Yes Linda Alves PA-C         Medication history completed by: Shannan Aguero (Lily), Student Pharmacist

## 2018-03-19 NOTE — OR NURSING
Attempted to give report to Mariia RN 7C.  Mariia unable to take report at this time.  Will call PACU back in about 10-15 mins.

## 2018-03-19 NOTE — PROGRESS NOTES
[facility], [place]    Progress Note  Hematology / Oncology     Date of Admission:  3/18/2018  Hospital Day #: 1   Date of Service (when I saw the patient): 03/19/2018    Assessment & Plan   Sebas Lopez is a 52 year old male with a history of ulcerative colitis and metastatic sigmoid adenocarcinoma with diffuse peritoneal carcinomatosis who presented with 2 days of gradually worsening LLQ, found to have large bowel obstruction on CT.       #Large bowel obstruction.  This is a recurrent problem from sigmoid mass and peritoneal carcinomatosis. He has end ileostomy and is on tpn. Had colonic stent placed on 2/6/18 with significant improvement in symptoms. On admission CT scan he has pan-colonic distension with transition point near the sigmoid stent. He has an ileostomy so an NG tube will not be helpful. Pain is reasonably controlled with IV Dilaudid. Lactate normal, hemodynamically stable. Evaluated by colorectal surgery. Treating conservatively for now. A surgical option is placing colostomy proximal to the obstruction but with his peritoneal disease he could still develop bowel obstructions at other sites.   - GI and CRS consult, appreciate assistance  - Continue TPN  - Will have colonic stent revision today     #Stage IV Sigmoid Adenocarcinoma with peritoneal carcinomatosis.  S/p ex lap 7/2017 with small bowel resection and end ileostomy. S/p 6 cycles of FOLFOX. Admitted on 12/6/2017 for ex-lap with aborted HIPEC due to diffuse carcinomatosis. Repeat CT scan showed worsening peritoneal carcinomatosis and he is getting more symptomatic in terms of worsening abdominal pain and difficulty eating because of worsening abdominal cramping. He was admitted to the hospital with worsening colon distention and colon stent was placed which improved his symptoms. Follows with Dr. Long outpatient. Restarted Cycle 7 FOLFOX on 3/8/18.  - Cycle 8 planned for 3/22.    #Anemia of chronic  disease. Pt with Hgb of 9.4 on admission. Takes iron supplement at home. No evidence of bleeding.   - Monitor, no transfusions expected while admitted.     #Acute on chronic cancer related abdominal pain.  #Pain Assessment.   Current Pain Score 3/19/2018 3/19/2018 3/19/2018   Patient currently in pain? denies denies denies   Pain score (0-10) - - -   Pain location - - -   Pain descriptors - - -   - Sebas is experiencing pain due to large bowel obstruction on top of chronic cancer related pain. Pain management was discussed and the plan was created in a collaborative fashion.  Sebas's response to the current recommendations: engaged  - Opioid regimen: Continue home OxyContin 20mg BID, oxycodone 5-10mg q4h prn. Add hydromorphone 1mg q4h prn for acute on chronic pain  - Response to opioid medications: Reduction of symptoms but still with pain prior to stent placement  - Bowel regimen: not needed. Good output through ileostomy.    FEN:  -Continue TPN. Start CLD post-procedure, advance as tolerated.  -Replace lytes prn    Prophy/Misc:  -VTE: Lovenox ppx  -GI: Protonix    Code Status: FULL  Disposition: Pending stent placement and improvement in symptoms. Possible discharge 3/20.  -currently has appointment on 3/22 for possible Cycle 8 of FOLFOX.     This plan of care has been discussed with the staff physician, Dr. Hardin.    Ericka Lentz PA-C  Hematology/Oncology  Pager: [phone]    Interval History   Sebas is doing ok today. Continues to have crampy abdominal pain. Currently at ~ a 5/10, but when it gets bad it can go up to nearly 10/10. Abd continues to be progressively more distended. Reports good pain relief with previous stent placement. Continues to have output from ileostomy. Discussed plan for procedure today.    Vital Signs with Ranges  Temp:  [96.7  F (35.9  C)-98.6  F (37  C)] 98.6  F (37  C)  Pulse:  [] 98  Heart Rate:  [] 104  Resp:  [11-18] 17  BP: (108-137)/(56-90) 108/56  SpO2:   [95 %-100 %] 95 %    I/O last 3 completed shifts:  In: 1132.26 [P.O.:20; I.V.:540.3]  Out: 1315 [Urine:1215; Stool:100]    Vitals:    03/18/18 0346 03/18/18 0953 03/19/18 0800   Weight: 67.6 kg (149 lb) 70.4 kg (155 lb 3.2 oz) 72.2 kg (159 lb 3.2 oz)       Physical Exam   Constitutional: Pleasant and cooperative male seen lying in bed. Awake, alert, NAD.  HEENT: NC/AT, EOMI, sclera clear, conjunctiva normal  Respiratory: No increased work of breathing, CTAB, no crackles or wheezing.  Cardiovascular: Tachy, normal rhythm, no murmur noted. No peripheral edema.  GI: Absent bowel sounds, firm, distended. Ileostomy with pink stoma, thin greenish discharge present in bag.  Skin: Warm, dry, well-perfused. No bruising, bleeding, rashes, or lesions on limited exam.  Neurologic: A&O. Answers questions appropriately. Moves all extremities spontaneously.  Neuropsychiatric: Calm, appropriate affect    Medications     parenteral nutrition - ADULT compounded formula CYCLE       - MEDICATION INSTRUCTIONS -       - MEDICATION INSTRUCTIONS -       IV fluid REPLACEMENT ONLY       parenteral nutrition - ADULT compounded formula CYCLE 180 mL/hr at 03/18/18 2050         sodium chloride (PF)  3 mL Intracatheter Q8H     ondansetron  8 mg Oral Q8H     oxyCODONE  20 mg Oral Q12H     enoxaparin  40 mg Subcutaneous Q24H     pantoprazole (PROTONIX) IV  40 mg Intravenous Q24H     lipids  250 mL Intravenous Q24H       Antiinfectives  Anti-infectives     None          Data   CBC   Recent Labs  Lab 03/18/18  0408   WBC 8.9   RBC 3.35*   HGB 9.4*   HCT 29.4*   MCV 88   MCH 28.1   MCHC 32.0   RDW 17.0*          CMP   Recent Labs  Lab 03/19/18  0941 03/18/18  0408   NA  --  140   POTASSIUM  --  4.1   CHLORIDE  --  105   CO2  --  25   ANIONGAP  --  9   GLC  --  101*   BUN  --  20   CR  --  0.80   GFRESTIMATED  --  >90   GFRESTBLACK  --  >90   ANNAMARIE  --  8.9   MAG 2.0  --    PHOS 3.5  --    PROTTOTAL  --  7.6   ALBUMIN  --  2.8*   BILITOTAL  --   0.6   ALKPHOS  --  161*   AST  --  33   ALT  --  53       LFTs   Recent Labs  Lab 03/18/18  0408   PROTTOTAL 7.6   ALBUMIN 2.8*   BILITOTAL 0.6   ALKPHOS 161*   AST 33   ALT 53       Coagulation Studies   Recent Labs  Lab 03/18/18  1658   INR 1.12   PTT 37

## 2018-03-19 NOTE — PLAN OF CARE
"Problem: Patient Care Overview  Goal: Plan of Care/Patient Progress Review  Outcome: Therapy, progress toward functional goals is gradual  Pt NPO in AM for GI procedure in the OR.  Left at approx. 1025 AM and returned at 1340 from PACU.  Pt had a flex sigmoidoscopy and new stent placement.  Patient now has two bowel stents.  Pt states he feels \"much better\".  Abdomen less distended and s/w soft post-procedure.  AVSS, Capnography WNL.  Will start on clear liquid diet.  IVF SL'd.  Denies pain/nausea at this time.  Parents in room and supportive.  Continue to monitor.      "

## 2018-03-19 NOTE — ANESTHESIA POSTPROCEDURE EVALUATION
Patient: Sebas Lopez    Procedure(s):  flexible sigmoidoscopy with stent placement and dilation - Wound Class: II-Clean Contaminated    Diagnosis:colonic obstruction  Diagnosis Additional Information: No value filed.    Anesthesia Type:  General, ETT    Note:  Anesthesia Post Evaluation    Patient location during evaluation: PACU  Patient participation: Able to fully participate in evaluation  Level of consciousness: awake and alert  Pain management: adequate  Airway patency: patent  Cardiovascular status: acceptable  Respiratory status: acceptable  Hydration status: acceptable  PONV: none     Anesthetic complications: None    Comments: Wide awake, doing well.        Last vitals:  Vitals:    03/19/18 1236 03/19/18 1245 03/19/18 1300   BP: 113/71 116/78 115/77   Pulse:      Resp: 15 13 13   Temp: 36.8  C (98.2  F) 36.7  C (98.1  F) 36.7  C (98.1  F)   SpO2: 100% 100% 99%         Electronically Signed By: Trent Melo MD  March 19, 2018  1:08 PM

## 2018-03-20 NOTE — PLAN OF CARE
Problem: Patient Care Overview  Goal: Plan of Care/Patient Progress Review  Outcome: Adequate for Discharge Date Met: 03/20/18  Pt doing well.  Pt requested pain med x 1 this AM and also when he left for discharge.  Requested med before ride home.  Some abdominal discomfort reported but not painful as it was yesterday prior to his procedure.  Advanced to regular diet and tolerated well.  Patient voiding adequate amounts.  Ileostomy with liquid output.  Ambulating independently.  Patient discharged to home at 1535 accompanied by parents.

## 2018-03-20 NOTE — DISCHARGE SUMMARY
[facility], [place]    Discharge Summary  Hematology / Oncology    Date of Admission:  3/18/2018  Date of Discharge:  03/20/2018  Discharging Provider: Ericka Lentz  Date of Service (when I saw the patient): 03/20/2018    Discharge Diagnoses   Large bowel obstruction. 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    Anemia of chronic disease.  Acute on chronic cancer related abdominal pain. -- Improved  Malnutrition- non severe    History of Present Illness   ### Adopted from H&P ###    Sebas developed LLQ pain two days ago. Gradually worsened over last 24 hrs. No n/v. He is drinking small amounts of fluid. Ileostomy is draining small amount of green liquid. Had small amount of output from rectum. No fever or chills.   Hospital Course   Sebas Lopez is a 52 year old male with a history of ulcerative colitis and metastatic sigmoid adenocarcinoma with diffuse peritoneal carcinomatosis who was admitted on 3/18/2018 with 2 days of gradually worsening LLQ, found to have large bowel obstruction on CT. The following problems were addressed during his hospital stay:       #Large bowel obstruction. RESOLVED  This is a recurrent problem from sigmoid mass and peritoneal carcinomatosis. He has end ileostomy and is on tpn. Had colonic stent placed on 2/6/18 with significant improvement in symptoms. On admission CT scan he has pan-colonic distension with transition point near the sigmoid stent. He has an ileostomy so an NG tube will not be helpful. Pain is reasonably controlled with IV Dilaudid. Lactate normal, hemodynamically stable. Evaluated by colorectal surgery. Treating conservatively for now. A surgical option is placing colostomy proximal to the obstruction but with his peritoneal disease he could still develop bowel obstructions at other sites.   - GI performed colonscopy with colonic stent placement 3/19. Findings shows malignant completely  obstructing tumor in the sigmoid colon consistent with adenocarcinoma. Symptoms improved post-procedure, patient was able to tolerate his usual diet.     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  S/p ex lap 7/2017 with small bowel resection and end ileostomy. S/p 6 cycles of FOLFOX. Admitted on 12/6/2017 for ex-lap with aborted HIPEC due to diffuse carcinomatosis. Repeat CT scan showed worsening peritoneal carcinomatosis and he is getting more symptomatic in terms of worsening abdominal pain and difficulty eating because of worsening abdominal cramping. He was admitted to the hospital with worsening colon distention and colon stent was placed which improved his symptoms. Follows with Dr. Long outpatient. Restarted Cycle 7 FOLFOX on 3/8/18.  - Cycle 8 planned for 3/22.     #Anemia of chronic disease. Pt with Hgb of 9.4 on admission. Takes iron supplement at home. No evidence of bleeding. Stable.     #Acute on chronic cancer related abdominal pain.  #Discharge Pain Plan.  - Sebas is experiencing pain due to large bowel obstruction (no resolved) on top of chronic cancer related pain. Pain management was discussed and the plan was created in a collaborative fashion.  Sebas's response to the current recommendations: engaged  - Opioid regimen: Continue home OxyContin 20mg BID, oxycodone 5-10mg q4h prn. No scripts for pain meds required.    Malnutrition- non severe  Decreased intake of greater than 5% over previous month.  5% weight loss prior to admission.  Resolving with placement of new stent.    Disposition: Discharged to home on 3/20  -currently has appointment on 3/22 for possible Cycle 8 of FOLFOX. No other follow-up requested.    Ericka Lentz PA-C  Hematology/Oncology  Pager: [phone]    Code Status   Full Code    Primary Care Physician   NGUYỄN HERNÁNDEZ    Vital Signs with Ranges  Temp:  [98  F (36.7  C)-98.6  F (37  C)] 98.4  F (36.9  C)  Pulse:  [] 99  Heart Rate:  [93]  93  Resp:  [12-22] 16  BP: (108-126)/(56-86) 124/83  SpO2:  [95 %-100 %] 97 %  159 lbs 3.2 oz    Physical Exam   Constitutional: Pleasant and cooperative male seen lying in bed. Awake, alert, NAD.  HEENT: NC/AT, EOMI, sclera clear, conjunctiva normal  Respiratory: No increased work of breathing, CTAB, no crackles or wheezing.  Cardiovascular: Tachy, normal rhythm, no murmur noted. No peripheral edema.  GI: Absent bowel sounds, firm, distended. Ileostomy with pink stoma, thin greenish discharge present in bag.  Skin: Warm, dry, well-perfused. No bruising, bleeding, rashes, or lesions on limited exam.  Neurologic: A&O. Answers questions appropriately. Moves all extremities spontaneously.  Neuropsychiatric: Calm, appropriate affect    Discharge Disposition   Discharged to home  Condition at discharge: Good    Consultations This Hospital Stay   COLORECTAL SURGERY ADULT IP CONSULT  PHARMACY/NUTRITION TO START AND MANAGE TPN  PHARMACY IP CONSULT  GI LUMINAL ADULT IP CONSULT  MEDICATION HISTORY IP PHARMACY CONSULT    Discharge Orders     Home care nursing referral     Reason for your hospital stay   You were here due to a blockage in your colon. You has a second stent placed to open the blockage.     Follow Up and recommended labs and tests   Scheduled appointments are listed below. Please keep your appointment for Thursday.     Activity   Your activity upon discharge: activity as tolerated     When to contact your care team   Please call the [facility] at [phone] if you notice increased or continued blood from your anus, or become dizzy or lightheaded. Please also call if you develop temperature above 100.4, shortness of breath, chest pain, headaches, vision changes, bleeding, uncontrolled nausea, vomiting, diarrhea, or pain.     Full Code     Diet   Follow this diet upon discharge: Regular, continue normal TPN regimen       Discharge Medications   Current Discharge  Medication List      CONTINUE these medications which have CHANGED    Details   ondansetron (ZOFRAN-ODT) 8 MG ODT tab Take 1 tablet (8 mg) by mouth every 8 hours  Qty: 60 tablet, Refills: 0    Associated Diagnoses: Nausea         CONTINUE these medications which have NOT CHANGED    Details   Multiple Vitamins-Minerals (MULTIVITAMIN & MINERAL PO) Take 1 tablet by mouth daily      oxyCODONE (OXY-IR) 5 MG capsule Take 1-2 capsules (5-10 mg) by mouth every 4 hours as needed for moderate to severe pain  Qty: 100 capsule, Refills: 0    Associated Diagnoses: Peritoneal carcinomatosis (H)      parenteral nutrition - PTA/DISCHARGE ORDER Inject into the vein daily See medication history note from 2/20/18 for most recent TPN formula.      oxyCODONE (OXYCONTIN) 10 MG 12 hr tablet Take 2 tablets (20 mg) by mouth every 12 hours  Qty: 120 tablet, Refills: 0    Associated Diagnoses: Peritoneal carcinomatosis (H); Cancer of sigmoid colon (H)      omeprazole (PRILOSEC) 20 MG CR capsule Take 1 capsule (20 mg) by mouth daily  Qty: 30 capsule, Refills: 11    Associated Diagnoses: Diarrhea, unspecified type      amylase-lipase-protease (CREON) 84485 UNITS CPEP Take 2 capsules (72,000 Units) by mouth 3 times daily (with meals) And 1 capsule with each snack tid.  Qty: 240 capsule, Refills: 3    Comments: Mail to patient if not yet due to be filled.  Associated Diagnoses: Diarrhea, unspecified type; Peritoneal carcinomatosis (H); Cancer of sigmoid colon (H)      LORazepam (ATIVAN) 0.5 MG tablet Take 1 tablet (0.5 mg) by mouth every 4 hours as needed (Anxiety, Nausea/Vomiting or Sleep)  Qty: 30 tablet, Refills: 5    Associated Diagnoses: Peritoneal carcinomatosis (H); Cancer of sigmoid colon (H)      Psyllium (METAMUCIL FIBER) 51.7 % PACK Take 1 packet by mouth 3 times daily  Qty: 90 each, Refills: 3    Associated Diagnoses: Diarrhea, unspecified type      Ferrous Sulfate (IRON SUPPLEMENT PO) Take 325 mg by mouth 2 times daily (with meals)             Allergies   Allergies   Allergen Reactions     Liquid Adhesive Rash     Dermabond, rash with pustules, occurred with abdominal surgery and port removal        Data   Most Recent 3 CBC's:  Recent Labs   Lab Test  03/20/18   0727  03/19/18   1646  03/18/18   0408   WBC  9.2  9.7  8.9   HGB  8.7*  8.7*  9.4*   MCV  89  88  88   PLT  301  292  356      Most Recent 3 BMP's:  Recent Labs   Lab Test  03/20/18   0727  03/18/18   0408  03/08/18   1243   NA  137  140  137   POTASSIUM  4.1  4.1  4.2   CHLORIDE  105  105  105   CO2  26  25  25   BUN  17  20  27   CR  0.75  0.80  0.77   ANIONGAP  6  9  7   ANNAMARIE  8.5  8.9  9.3   GLC  124*  101*  94     Most Recent 2 LFT's:  Recent Labs   Lab Test  03/18/18   0408  03/08/18   1243   AST  33  30   ALT  53  40   ALKPHOS  161*  163*   BILITOTAL  0.6  0.3     Most Recent INR's and Anticoagulation Dosing History:  Anticoagulation Dose History     Recent Dosing and Labs Latest Ref Rng & Units 8/10/2017 1/26/2018 2/6/2018 2/7/2018 2/21/2018 3/18/2018    INR 0.86 - 1.14 1.12 1.25(H) 2.31(H) 2.14(H) 1.27(H) 1.12        Most Recent 3 Troponin's:No lab results found.  Most Recent Cholesterol Panel:  Recent Labs   Lab Test  03/18/18   1658   TRIG  88     Most Recent 6 Bacteria Isolates From Any Culture (See EPIC Reports for Culture Details):  Recent Labs   Lab Test  02/25/18   0603  02/25/18   0530  02/24/18   0644  02/24/18   0630  02/23/18   1335  02/23/18   0606   CULT  No growth  No growth  No growth  No growth  No growth  No growth     Most Recent TSH, T4 and A1c Labs:No lab results found.  Results for orders placed or performed during the hospital encounter of 03/18/18   CT Abdomen Pelvis w Contrast     Value    Radiologist flags Large bowel obstruction (Urgent)    Narrative    EXAM: CT ABDOMEN PELVIS W CONTRAST 3/18/2018 6:39 AM     History: Pain, colon cancer, evaluate for obstruction    Comparison: CT of the abdomen and pelvis 2/20/2018    Technique: CT of the abdomen and  pelvis were obtained with IV  contrast. Sagittal and coronal reconstructions created and reviewed.    Contrast: 92 mL [id number]    Findings:   The liver, gallbladder, adrenal glands, spleen, and pancreas appear  normal. There is symmetric nephrographic phase without hydronephrosis  or renal calculi. The urinary bladder appears normal, partially  obscured by metallic streak artifact.    There are postsurgical changes of end ileostomy in the right lower  quadrant as well as colonic stent placement in the sigmoid colon.  There is marked pancolonic dilatation as well as mild distention of  the terminal ileum. Anastomotic changes of the ileum (series 5 image  340). The stent appears similar in position to previous CT, however  the lumen of the stent is decreased in diameter from previous and  continuous patency with the rectum is not well seen. A sigmoid mass is  again noted which is not substantially changed in size from previous  CT. Small bowel loops are normal in caliber throughout. No pneumatosis  or portal venous gas. No intra-abdominal abscess.    Peritoneal soft tissue nodularity and omental caking in the upper  anterior abdomen and pelvis is not significantly changed from previous  CT. Small volume of abdominal free fluid. Scattered enlarged  mesenteric lymph nodes are not substantially changed from previous CT.  The major abdominal vasculature is patent. No abdominal free air.    Post surgical changes of left total hip arthroplasty and subchondral  cystic changes of the acetabulum. Chronic deformities of the left  lateral ribs.    Bibasilar atelectasis.      Impression    Impression:   1. Pan colonic distention concerning for distal large bowel  obstruction. Transition point likely related to sigmoid stent which is  more narrowed on this exam and appears less contiguous with the  rectum.   2. Stable peritoneal thickening and nodularity with small volume  abdominal free fluid likely related to peritoneal  carcinomatosis.  3. Stable prominent mesenteric and retroperitoneal lymph nodes which  may be metastatic or reactive.    [Urgent Result: Large bowel obstruction]    Finding was identified on 3/18/2018 6:39 AM.     Dr. Rowe was contacted by Dr. Dr. Joseph at 3/18/2018 6:44 AM and  verbalized understanding of the urgent finding.     I have personally reviewed the examination and initial interpretation  and I agree with the findings.    ZEUS CROSS MD ([place])   XR Colon Water Soluble    Narrative    Examination:  Water soluble enema 3/18/2018 1:20 PM.    History: Evaluate sigmoid stent patency    Comparison: CT 3/18/2018    Technique: Gastrografin was introduced into the colon through a rectal  tube under gravity.    Findings: The  film demonstrates a nonobstructed bowel gas  pattern.    A water soluble enema was performed. Contrast passed through the lower  half of the sigmoid stent, but no contrast was seen passing through  the proximal half of the sigmoid stent. Postevacuation radiograph  demonstrates complete emptying.      Impression    Impression: Unable to pass Gastrografin through the upper half of the  sigmoid stent.     Procedure performed by Dr. Hendricks. I was not present for the  procedure.    I have personally reviewed the examination and initial interpretation  and I agree with the findings.    ZEUS CROSS MD ([place])   XR Surgery TALA L/T 5 Min Fluoro w Stills    Narrative    This exam was marked as non-reportable because it will not be read by a   radiologist or a [place] non-radiologist provider.

## 2018-03-20 NOTE — PLAN OF CARE
Problem: Patient Care Overview  Goal: Plan of Care/Patient Progress Review  Outcome: Improving  AVSS. A&Ox4. Apical pulse regular. Lungs CTA. Abdomen soft and pt reports that it is much less distended than it had been prior to new stent placement. Bowel sounds active in all quadrants. Tolerating clear liquid diet. Voiding spontaneously with adequate.TPN cycled and Lipids running through PICC. PICC caps and dressing due to be changed today, 3/20. Pain controlled with Oxycodone and scheduled Oxycontin. Capnography can be removed at 1300. Continue with POC. Pt would not like bedside report if awake.

## 2018-03-20 NOTE — PROVIDER NOTIFICATION
Notified Resident at 0020 AM regarding low urine output.      Spoke with: Samimian    Orders were not obtained.    Comments: UV 50 cc for last 3 hours.  Will continue to monitor.

## 2018-03-20 NOTE — PLAN OF CARE
Problem: Patient Care Overview  Goal: Plan of Care/Patient Progress Review  Outcome: Improving  UAL, tolerating clear liquid diet. Pain is manageable to nothing. Prn oxycodone given once. Bowel sound audible, + gas and stool from the ileostomy. Independent with cares. OVSS, afebrile. On capnography continuous till around 1300H. PLAN: Continue with the care plan. Possible discharge tomorrow

## 2018-03-20 NOTE — PROGRESS NOTES
Brief GI note:     Chart review and patient seen    Patient had flex sig yesterday and noted tumor ingrowth, managed by a in-stent stent placement. He is able to pass mucus today, feel less distended and much better today.      GI will sign off at this time, please contact us with stent related issues       Gastroenterology Inpatient Sign Off Note    Inpatient GI consults service will sign off. No further recommendations at this time. If primary team has addition questions, please page consult fellow listed in [place].    Current GI Consult Staff: Dr. Rios    Follow up recommendations:   No outpatient GI clinic follow-up indicated. Follow-up with primary care provider at timing determined by discharge physician.    If outpatient GI follow-up is felt indicated by primary care, they may coordinate this by placing new GI referral and contacting General GI clinic - ([phone]); Advanced Endoscopy clinic (Esophageal and Pancreas Biliary Clinics) - ([phone]); IBD clinic - ([phone]); Hepatology Clinic (Liver) - ([phone])     Fransisco Ramirez  GI fellow  P [id number]

## 2018-03-21 NOTE — PROGRESS NOTES
POST HOSPITAL DISCHARGE CALL    Discharge Date: 3/20/18    No post hospital call needed as patient has a provider appointment within 72 hours.    Appointment info:3/22/18    Kemi Cherry, MSN RN  Care Coordinator   [facility]

## 2018-03-22 NOTE — NURSING NOTE
Chief Complaint   Patient presents with     Blood Draw     labs drawn from picc line by rn.  vs taken.     PICC line accessed by RN, labs drawn.     Vital signs taken.    Elo Miles RN

## 2018-03-22 NOTE — LETTER
3/22/2018      RE: Sebas Lopez  [address] [place] [address]       Oncology follow up visit:  Date on this visit: Mar 22, 2018    CC  sigmoid adenocarcinoma with peritoneal carcinomatosis    Primary Physician: Waylon Han     History Of Present Illness:     Please see previous note for details. I have copied and updated from prior notes.   Sebas is a 52-year-old male who has a history of ulcerative colitis diagnosed more than 20 years ago, but he has not had medical management for that.  He was doing well, but in 05/2017 he presented with a few weeks of abdominal pain.  At that time, his imaging showed that he had a sigmoid wall thickening with adjacent mesenteric lymphadenopathy and free fluid near the abdominal wall mesh from his prior hernia surgery.  He subsequently underwent a colonoscopy which was incomplete and showed an obstructing sigmoid colon mass.  The biopsy from the sigmoid mass showed sigmoid adenocarcinoma.  He then developed obstructive symptoms and underwent exploratory laparotomy on 07/10/2017.  During that he was found to have diffuse peritoneal carcinomatosis.  Extensive lysis of adhesions was performed and a small bowel resection was performed with end ileostomy.  The biopsies from the multiple large peritoneal metastasis also were positive for metastatic adenocarcinoma. We still haven't received testing on MSI or NGS panel back     He started palliative FOLFOX on 8/11/17. C#6 given on 10/26/17  After 6 cycles, he has stable disease    On 12/6/17, he had Diagnostic laparoscopy and Exploratory laparotomy, but HIPEC was not performed as Peritoneal cancer index was 26 with diffuse involvement of the small bowel as well as the small bowel mesentery.     He then presented to ED on 1/26 with abdominal pain and associated nausea. CT A/P on 1/26 with 5.2 x 4.5 x 3.6 cm proximal sigmoid mass causnig colonic obstriction with singificant distention of cecum (7cm in diameter),  ascending colon and proximal transverse colon. No pneumatosis or pneumoperitoneum. Also small volume of ascites with associated findings concerning for peritoneal carcinomatosis, increased from prior studies. Dr. Dudley was consulted and recommended no surgical intervention.    He was then seen in clinic on 1/29/2018 due to inability to tolerate solid foods and he was getting abdominal cramping and some nausea. He also noticed that his ostomy output decreased. He was given IV fluids as well as antibiotics and was started on OxyContin 10 mg twice a day along with p.r.n. oxycodone. Of note a few weeks prior he was started on short acting octreotide to decrease the ostomy output but he stopped taking it due to worsening of the abdominal cramping. A colonic stent was placed on 2/6/18.    He was recently hospitalized from 2/20-2/25/18 with presumed infectious colitis and bacteremia. His port was removed. He was discharged home on IV vancomycin. He resumed FOLFOX with dose reduced oxaliplatin due to previous neuropathy on 3/8/18. He was hospitalized with a bowel obstruction from 3/18-3/20/18 and a colonic stent was placed on 3/19/18. He comes in today for routine follow up.    Interim history  Patient reports that he is feeling much better since his recent hospitalization.  He feels that his ostomy output is now normal.  His abdominal pain he rates at a 2 out of 10.  He is taking OxyContin 20 mg twice a day and oxycodone 10 mg 3 times a day.  He reports significant improvement in his pain since the stent placement.  He continues on TPN at 12 hours a day.  He has been trying to eat more since his hospitalization and has eaten a chicken he had been pizza yesterday.  He feels that his appetite is fair, but he was able to eat about 2000 anel yesterday.  He thinks he is getting in at least 64 ounces of fluid a day.  Following the chemotherapy, he thinks he may have had some tingling in his right foot that was brief.  He  otherwise denies any neuropathy or cold sensitivity.  He did have more nausea after this chemotherapy than he remembers from when he was previously receiving chemotherapy.  He is unsure if that was related to the impending bowel obstruction or to the chemotherapy.  He denies any changes in the skin on his hands and feet.  He denies any changes to his stool output immediately after the chemo.  He denies other concerns.    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     Ulcerative colitis (H)      Past Surgical History:   Procedure Laterality Date     COLONOSCOPY  2017     COLONOSCOPY N/A 11/30/2017    Procedure: COLONOSCOPY;  Colonoscopy;  Surgeon: Rob Dudley MD;  Location: [facility]     COLONOSCOPY N/A 2/6/2018    Procedure: COMBINED COLONOSCOPY, STENT PLACEMENT;  COMBINED COLONOSCOPY with Colonic Stent Placement;  Surgeon: Guru Lionel Mar MD;  Location: [facility]     COLONOSCOPY N/A 3/19/2018    Procedure: COMBINED COLONOSCOPY, STENT PLACEMENT;  flexible sigmoidoscopy with stent placement and dilation;  Surgeon: Alexis Rios MD;  Location: [facility]     GI SURGERY  07/10/2017    Extensive lysis of adhesions, small bowel resection with end ileostomy.      HERNIA REPAIR       INSERT PORT VASCULAR ACCESS Right 8/10/2017    Procedure: INSERT PORT VASCULAR ACCESS;  Single Lumen Right Chest Power Port;  Surgeon: Malena Andrew PA-C;  Location: [facility]     LAPAROSCOPY DIAGNOSTIC (GENERAL) N/A 12/6/2017    Procedure: LAPAROSCOPY DIAGNOSTIC (GENERAL);  Diagnostic Laparoscopy, Exploratory Laparotomy Anesthesia Block ;  Surgeon: Rob Dudley MD;  Location: [facility]      LAPAROTOMY EXPLORATORY N/A 12/6/2017    Procedure: LAPAROTOMY EXPLORATORY;;  Surgeon: Rob Dudley MD;  Location: [facility]     ORTHOPEDIC SURGERY Left     HIP ARTHROPLASTY     PICC INSERTION Right 02/23/2018    5Fr DL BioFlo PICC, 44cm (3cm external) in the R basilic vein w/ tip in the SVC RA junction      Left hip replacement.     Allergies:  Allergies as of 03/22/2018 - Julius as Reviewed 03/22/2018   Allergen Reaction Noted     Liquid adhesive Rash 03/01/2018     Current Medications:  Current Outpatient Prescriptions   Medication Sig Dispense Refill     ondansetron (ZOFRAN-ODT) 8 MG ODT tab Take 1 tablet (8 mg) by mouth every 8 hours 60 tablet 0     Multiple Vitamins-Minerals (MULTIVITAMIN & MINERAL PO) Take 1 tablet by mouth daily       oxyCODONE (OXY-IR) 5 MG capsule Take 1-2 capsules (5-10 mg) by mouth every 4 hours as needed for moderate to severe pain 100 capsule 0     parenteral nutrition - PTA/DISCHARGE ORDER Inject into the vein daily See medication history note from 2/20/18 for most recent TPN formula.       oxyCODONE (OXYCONTIN) 10 MG 12 hr tablet Take 2 tablets (20 mg) by mouth every 12 hours 120 tablet 0     omeprazole (PRILOSEC) 20 MG CR capsule Take 1 capsule (20 mg) by mouth daily 30 capsule 11     amylase-lipase-protease (CREON) 44954 UNITS CPEP Take 2 capsules (72,000 Units) by mouth 3 times daily (with meals) And 1 capsule with each snack tid. 240 capsule 3     LORazepam (ATIVAN) 0.5 MG tablet Take 1 tablet (0.5 mg) by mouth every 4 hours as needed (Anxiety, Nausea/Vomiting or Sleep) 30 tablet 5     Psyllium (METAMUCIL FIBER) 51.7 % PACK Take 1 packet by mouth 3 times daily 90 each 3     Ferrous Sulfate (IRON SUPPLEMENT PO) Take 325 mg by mouth 2 times daily (with meals)        Physical Exam:  General: The patient is a pleasant male in no acute distress.  /84 (BP Location: Right arm, Patient Position: Sitting, Cuff Size: Adult Regular)  Pulse 98  Temp 98.7  F (37.1  C) (Oral)   Resp 16  Wt 67.6 kg (149 lb)  SpO2 98%  BMI 22 kg/m2  Wt Readings from Last 10 Encounters:   03/22/18 67.6 kg (149 lb)   03/19/18 72.2 kg (159 lb 3.2 oz)   03/08/18 69.2 kg (152 lb 8 oz)   03/01/18 68.5 kg (151 lb 1.6 oz)   02/25/18 69.1 kg (152 lb 6.4 oz)   02/15/18 61.6 kg (135 lb 11.2 oz)   02/09/18 64.1 kg (141 lb 4.8 oz)   02/05/18 66.5 kg (146 lb 11.2 oz)   02/01/18 64.9 kg (143 lb)   01/31/18 65.3 kg (143 lb 14.4 oz)   HEENT: EOMI, PERRL. Sclerae are anicteric. Oral mucosa is pink and moist with no lesions or thrush.   Lymph: Neck is supple with no lymphadenopathy in the cervical or supraclavicular areas.   Heart: Regular rate and rhythm.   Lungs: Clear to auscultation bilaterally.   Abdomen: Bowel sounds present, soft, mild central abdominal tenderness with central firmness, ostomy bag is in right abdomen with liquid brown stool.  Extremities: No lower extremity edema noted bilaterally.   Neuro: Cranial nerves II through XII are grossly intact.  Skin: PICC line is in place in right arm.  No rashes or lesions on exposed skin.     Laboratory/Imaging Studies   3/22/2018 06:39   Sodium 134   Potassium 4.4   Chloride 101   Carbon Dioxide 25   Urea Nitrogen 28   Creatinine 0.86   GFR Estimate >90   GFR Estimate If Black >90   Calcium 8.9   Anion Gap 8   Magnesium 2.1   Phosphorus 5.0 (H)   Albumin 3.0 (L)   Protein Total 7.9   Bilirubin Total 0.2   Alkaline Phosphatase 155 (H)   ALT 31   AST 22   Glucose 96   WBC 9.7   Hemoglobin 9.6 (L)   Hematocrit 30.3 (L)   Platelet Count 309   RBC Count 3.39 (L)   MCV 89   MCH 28.3   MCHC 31.7   RDW 16.6 (H)   Diff Method Automated Method   % Neutrophils 49.8   % Lymphocytes 24.7   % Monocytes 14.9   % Eosinophils 9.7   % Basophils 0.6   % Immature Granulocytes 0.3   Nucleated RBCs 0   Absolute Neutrophil 4.8   Absolute Lymphocytes 2.4   Absolute Monocytes 1.5 (H)   Absolute Eosinophils 0.9 (H)   Absolute Basophils 0.1   Abs Immature Granulocytes 0.0   Absolute Nucleated  RBC 0.0     NGS PANEL COLORECTAL    No mutations were identified in analyzed regions of KRAS, NRAS, BRAF, HRAS, or PIK3CA.       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  The sigmoid carcinoma is obstructing, requiring exploratory laparotomy and end ileostomy along with small bowel resection.    MSI intact and NGS panel does not reveal any identifiable mutations     He has been started on FOLFOX palliative chemotherapy. After 6 cycles, he had stable disease    On 12/6/17, he had Diagnostic laparoscopy and Exploratory laparotomy, but HIPEC was not performed as Peritoneal cancer index was 26 with diffuse involvement of the small bowel as well as the small bowel mesentery.     Repeat CT scan showed worsening peritoneal carcinomatosis and he is getting more symptomatic in terms of worsening abdominal pain and difficulty eating because of worsening abdominal cramping. He was admitted to the hospital with worsening colon distention and colon stent was placed which improved his symptoms. Now he is doing much better.     Plan was to resume FOLFOX, but then he was hospitalized with infectious colitis and bacteremia. He recovered well from this and resumed FOLFOX on 3/8/18. Because of previous neuropathy, the dose of oxaliplatin was decreased to 70 mg/m . We may consider adding Avatin or anti-EGFR therapy in the future as well.     He tolerated resuming FOLFOX fairly well with some very mild fleeting neuropathy in his right foot and some nausea. I will add in IV Emend. He will continue with cycle 2 FOLFOX today, as he has recovered well from his recent bowel obstruction. I will see him back in 2 and 4 weeks prior to cycle 3 and 4. Will plan to give him 4-6 cycles of FOLFOX before repeat imaging.    Abdominal pain due to peritoneal carcinomatosis and colon obstruction. This is much better after placement of another colonic stent. He will continue on OxyContin 20 mg bid and  oxycodone prn, currently 10 mg tid.     FEN. Patient remains on 12 hours TPN/day and is eating as tolerated. His weight is fairly stable as compared to prior to his hospitalization. Encouraged continuing to push protein in his diet. He has been following with a dietician as well.     History of iron deficient anemia. Patient received IV iron in the August 2018. Hemoglobin has stabilized. Iron studies in early March 2018 show replete iron stores. He will continue on oral iron.    Linda Alves PA-C  [facility]  [address] [place] [id number] [phone]

## 2018-03-22 NOTE — NURSING NOTE
"Oncology Rooming Note    March 22, 2018 6:48 AM   Sebas Lopez is a 52 year old male who presents for:    Chief Complaint   Patient presents with     Blood Draw     labs drawn from picc line by rn.  vs taken.     Oncology Clinic Visit     Return: Colon CA     Initial Vitals: /84 (BP Location: Right arm, Patient Position: Sitting, Cuff Size: Adult Regular)  Pulse 98  Temp 98.7  F (37.1  C) (Oral)  Resp 16  Wt 67.6 kg (149 lb)  SpO2 98%  BMI 22 kg/m2 Estimated body mass index is 22 kg/(m^2) as calculated from the following:    Height as of 3/18/18: 1.753 m (5' 9\").    Weight as of this encounter: 67.6 kg (149 lb). Body surface area is 1.81 meters squared.  Mild Pain (2) Comment: Data Unavailable   No LMP for male patient.  Allergies reviewed: Yes  Medications reviewed: Yes    Medications: needs a refill on the oxycodone RX for 10 mg  Pharmacy name entered into [employer]: [facility] - [address]    Clinical concerns: patient ended up in the hospital over the weekend with a blockage and ended up having a new stent placed in his bowel.  Patient was told by the hospital that he could run the TPN at the same time as the chemo, because he has a double lumen?  So he is wondering if that is correct and if so would like to do that. Linda Joselyn was notified.    10 minutes for nursing intake (face to face time)     Mike Bridges CMA              "

## 2018-03-22 NOTE — PROGRESS NOTES
Oncology follow up visit:  Date on this visit: Mar 22, 2018    CC  sigmoid adenocarcinoma with peritoneal carcinomatosis    Primary Physician: Waylon Han     History Of Present Illness:     Please see previous note for details. I have copied and updated from prior notes.   Sebas is a 52-year-old male who has a history of ulcerative colitis diagnosed more than 20 years ago, but he has not had medical management for that.  He was doing well, but in 05/2017 he presented with a few weeks of abdominal pain.  At that time, his imaging showed that he had a sigmoid wall thickening with adjacent mesenteric lymphadenopathy and free fluid near the abdominal wall mesh from his prior hernia surgery.  He subsequently underwent a colonoscopy which was incomplete and showed an obstructing sigmoid colon mass.  The biopsy from the sigmoid mass showed sigmoid adenocarcinoma.  He then developed obstructive symptoms and underwent exploratory laparotomy on 07/10/2017.  During that he was found to have diffuse peritoneal carcinomatosis.  Extensive lysis of adhesions was performed and a small bowel resection was performed with end ileostomy.  The biopsies from the multiple large peritoneal metastasis also were positive for metastatic adenocarcinoma. We still haven't received testing on MSI or NGS panel back     He started palliative FOLFOX on 8/11/17. C#6 given on 10/26/17  After 6 cycles, he has stable disease    On 12/6/17, he had Diagnostic laparoscopy and Exploratory laparotomy, but HIPEC was not performed as Peritoneal cancer index was 26 with diffuse involvement of the small bowel as well as the small bowel mesentery.     He then presented to ED on 1/26 with abdominal pain and associated nausea. CT A/P on 1/26 with 5.2 x 4.5 x 3.6 cm proximal sigmoid mass causnig colonic obstriction with singificant distention of cecum (7cm in diameter), ascending colon and proximal transverse colon. No pneumatosis or pneumoperitoneum. Also  small volume of ascites with associated findings concerning for peritoneal carcinomatosis, increased from prior studies. Dr. Dudley was consulted and recommended no surgical intervention.    He was then seen in clinic on 1/29/2018 due to inability to tolerate solid foods and he was getting abdominal cramping and some nausea. He also noticed that his ostomy output decreased. He was given IV fluids as well as antibiotics and was started on OxyContin 10 mg twice a day along with p.r.n. oxycodone. Of note a few weeks prior he was started on short acting octreotide to decrease the ostomy output but he stopped taking it due to worsening of the abdominal cramping. A colonic stent was placed on 2/6/18.    He was recently hospitalized from 2/20-2/25/18 with presumed infectious colitis and bacteremia. His port was removed. He was discharged home on IV vancomycin. He resumed FOLFOX with dose reduced oxaliplatin due to previous neuropathy on 3/8/18. He was hospitalized with a bowel obstruction from 3/18-3/20/18 and a colonic stent was placed on 3/19/18. He comes in today for routine follow up.    Interim history  Patient reports that he is feeling much better since his recent hospitalization.  He feels that his ostomy output is now normal.  His abdominal pain he rates at a 2 out of 10.  He is taking OxyContin 20 mg twice a day and oxycodone 10 mg 3 times a day.  He reports significant improvement in his pain since the stent placement.  He continues on TPN at 12 hours a day.  He has been trying to eat more since his hospitalization and has eaten a chicken he had been pizza yesterday.  He feels that his appetite is fair, but he was able to eat about 2000 anel yesterday.  He thinks he is getting in at least 64 ounces of fluid a day.  Following the chemotherapy, he thinks he may have had some tingling in his right foot that was brief.  He otherwise denies any neuropathy or cold sensitivity.  He did have more nausea after this  chemotherapy than he remembers from when he was previously receiving chemotherapy.  He is unsure if that was related to the impending bowel obstruction or to the chemotherapy.  He denies any changes in the skin on his hands and feet.  He denies any changes to his stool output immediately after the chemo.  He denies other concerns.    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     Ulcerative colitis (H)      Past Surgical History:   Procedure Laterality Date     COLONOSCOPY  2017     COLONOSCOPY N/A 11/30/2017    Procedure: COLONOSCOPY;  Colonoscopy;  Surgeon: Rob Dudley MD;  Location: [facility]     COLONOSCOPY N/A 2/6/2018    Procedure: COMBINED COLONOSCOPY, STENT PLACEMENT;  COMBINED COLONOSCOPY with Colonic Stent Placement;  Surgeon: Guru Lionel Mar MD;  Location: [facility]     COLONOSCOPY N/A 3/19/2018    Procedure: COMBINED COLONOSCOPY, STENT PLACEMENT;  flexible sigmoidoscopy with stent placement and dilation;  Surgeon: Alexis Rios MD;  Location: [facility]     GI SURGERY  07/10/2017    Extensive lysis of adhesions, small bowel resection with end ileostomy.      HERNIA REPAIR       INSERT PORT VASCULAR ACCESS Right 8/10/2017    Procedure: INSERT PORT VASCULAR ACCESS;  Single Lumen Right Chest Power Port;  Surgeon: Malena Andrew PA-C;  Location: [facility]     LAPAROSCOPY DIAGNOSTIC (GENERAL) N/A 12/6/2017    Procedure: LAPAROSCOPY DIAGNOSTIC (GENERAL);  Diagnostic Laparoscopy, Exploratory Laparotomy Anesthesia Block ;  Surgeon: Rob Dudley MD;  Location: [facility]     LAPAROTOMY EXPLORATORY N/A 12/6/2017    Procedure: LAPAROTOMY EXPLORATORY;;  Surgeon: Luis Enrique  Rob Barroso MD;  Location: [facility]     ORTHOPEDIC SURGERY Left     HIP ARTHROPLASTY     PICC INSERTION Right 02/23/2018    5Fr DL BioFlo PICC, 44cm (3cm external) in the R basilic vein w/ tip in the SVC RA junction      Left hip replacement.     Allergies:  Allergies as of 03/22/2018 - Julius as Reviewed 03/22/2018   Allergen Reaction Noted     Liquid adhesive Rash 03/01/2018     Current Medications:  Current Outpatient Prescriptions   Medication Sig Dispense Refill     ondansetron (ZOFRAN-ODT) 8 MG ODT tab Take 1 tablet (8 mg) by mouth every 8 hours 60 tablet 0     Multiple Vitamins-Minerals (MULTIVITAMIN & MINERAL PO) Take 1 tablet by mouth daily       oxyCODONE (OXY-IR) 5 MG capsule Take 1-2 capsules (5-10 mg) by mouth every 4 hours as needed for moderate to severe pain 100 capsule 0     parenteral nutrition - PTA/DISCHARGE ORDER Inject into the vein daily See medication history note from 2/20/18 for most recent TPN formula.       oxyCODONE (OXYCONTIN) 10 MG 12 hr tablet Take 2 tablets (20 mg) by mouth every 12 hours 120 tablet 0     omeprazole (PRILOSEC) 20 MG CR capsule Take 1 capsule (20 mg) by mouth daily 30 capsule 11     amylase-lipase-protease (CREON) 08105 UNITS CPEP Take 2 capsules (72,000 Units) by mouth 3 times daily (with meals) And 1 capsule with each snack tid. 240 capsule 3     LORazepam (ATIVAN) 0.5 MG tablet Take 1 tablet (0.5 mg) by mouth every 4 hours as needed (Anxiety, Nausea/Vomiting or Sleep) 30 tablet 5     Psyllium (METAMUCIL FIBER) 51.7 % PACK Take 1 packet by mouth 3 times daily 90 each 3     Ferrous Sulfate (IRON SUPPLEMENT PO) Take 325 mg by mouth 2 times daily (with meals)        Physical Exam:  General: The patient is a pleasant male in no acute distress.  /84 (BP Location: Right arm, Patient Position: Sitting, Cuff Size: Adult Regular)  Pulse 98  Temp 98.7  F (37.1  C) (Oral)  Resp 16  Wt 67.6 kg (149 lb)  SpO2 98%  BMI 22 kg/m2  Wt Readings from Last 10 Encounters:    03/22/18 67.6 kg (149 lb)   03/19/18 72.2 kg (159 lb 3.2 oz)   03/08/18 69.2 kg (152 lb 8 oz)   03/01/18 68.5 kg (151 lb 1.6 oz)   02/25/18 69.1 kg (152 lb 6.4 oz)   02/15/18 61.6 kg (135 lb 11.2 oz)   02/09/18 64.1 kg (141 lb 4.8 oz)   02/05/18 66.5 kg (146 lb 11.2 oz)   02/01/18 64.9 kg (143 lb)   01/31/18 65.3 kg (143 lb 14.4 oz)   HEENT: EOMI, PERRL. Sclerae are anicteric. Oral mucosa is pink and moist with no lesions or thrush.   Lymph: Neck is supple with no lymphadenopathy in the cervical or supraclavicular areas.   Heart: Regular rate and rhythm.   Lungs: Clear to auscultation bilaterally.   Abdomen: Bowel sounds present, soft, mild central abdominal tenderness with central firmness, ostomy bag is in right abdomen with liquid brown stool.  Extremities: No lower extremity edema noted bilaterally.   Neuro: Cranial nerves II through XII are grossly intact.  Skin: PICC line is in place in right arm.  No rashes or lesions on exposed skin.     Laboratory/Imaging Studies   3/22/2018 06:39   Sodium 134   Potassium 4.4   Chloride 101   Carbon Dioxide 25   Urea Nitrogen 28   Creatinine 0.86   GFR Estimate >90   GFR Estimate If Black >90   Calcium 8.9   Anion Gap 8   Magnesium 2.1   Phosphorus 5.0 (H)   Albumin 3.0 (L)   Protein Total 7.9   Bilirubin Total 0.2   Alkaline Phosphatase 155 (H)   ALT 31   AST 22   Glucose 96   WBC 9.7   Hemoglobin 9.6 (L)   Hematocrit 30.3 (L)   Platelet Count 309   RBC Count 3.39 (L)   MCV 89   MCH 28.3   MCHC 31.7   RDW 16.6 (H)   Diff Method Automated Method   % Neutrophils 49.8   % Lymphocytes 24.7   % Monocytes 14.9   % Eosinophils 9.7   % Basophils 0.6   % Immature Granulocytes 0.3   Nucleated RBCs 0   Absolute Neutrophil 4.8   Absolute Lymphocytes 2.4   Absolute Monocytes 1.5 (H)   Absolute Eosinophils 0.9 (H)   Absolute Basophils 0.1   Abs Immature Granulocytes 0.0   Absolute Nucleated RBC 0.0     NGS PANEL COLORECTAL    No mutations were identified in analyzed regions of KRAS,  NRAS, BRAF, HRAS, or PIK3CA.       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  The sigmoid carcinoma is obstructing, requiring exploratory laparotomy and end ileostomy along with small bowel resection.    MSI intact and NGS panel does not reveal any identifiable mutations     He has been started on FOLFOX palliative chemotherapy. After 6 cycles, he had stable disease    On 12/6/17, he had Diagnostic laparoscopy and Exploratory laparotomy, but HIPEC was not performed as Peritoneal cancer index was 26 with diffuse involvement of the small bowel as well as the small bowel mesentery.     Repeat CT scan showed worsening peritoneal carcinomatosis and he is getting more symptomatic in terms of worsening abdominal pain and difficulty eating because of worsening abdominal cramping. He was admitted to the hospital with worsening colon distention and colon stent was placed which improved his symptoms. Now he is doing much better.     Plan was to resume FOLFOX, but then he was hospitalized with infectious colitis and bacteremia. He recovered well from this and resumed FOLFOX on 3/8/18. Because of previous neuropathy, the dose of oxaliplatin was decreased to 70 mg/m . We may consider adding Avatin or anti-EGFR therapy in the future as well.     He tolerated resuming FOLFOX fairly well with some very mild fleeting neuropathy in his right foot and some nausea. I will add in IV Emend. He will continue with cycle 2 FOLFOX today, as he has recovered well from his recent bowel obstruction. I will see him back in 2 and 4 weeks prior to cycle 3 and 4. Will plan to give him 4-6 cycles of FOLFOX before repeat imaging.    Abdominal pain due to peritoneal carcinomatosis and colon obstruction. This is much better after placement of another colonic stent. He will continue on OxyContin 20 mg bid and oxycodone prn, currently 10 mg tid.     FEN. Patient remains on 12 hours TPN/day and is eating as  tolerated. His weight is fairly stable as compared to prior to his hospitalization. Encouraged continuing to push protein in his diet. He has been following with a dietician as well.     History of iron deficient anemia. Patient received IV iron in the August 2018. Hemoglobin has stabilized. Iron studies in early March 2018 show replete iron stores. He will continue on oral iron.    Linda Alves PA-C  [facility]  [address] [place] [id number] [phone]

## 2018-03-22 NOTE — PROGRESS NOTES
"Infusion Nursing Note:  Sebas Lopez presents for D1C8 Oxaliplatin, Leucovorin, Fluorouracil push and pump hook up  Met with ANNAMARIE Mckenna before infusion.    Note: TORB- ANNAMARIE Mckenna/Alis Mcfarland RN- pt does not need sandostatin today.    Treatment Conditions:  Lab Results   Component Value Date    HGB 9.6 03/22/2018     Lab Results   Component Value Date    WBC 9.7 03/22/2018      Lab Results   Component Value Date    ANEU 4.8 03/22/2018     Lab Results   Component Value Date     03/22/2018      Lab Results   Component Value Date     03/22/2018                   Lab Results   Component Value Date    POTASSIUM 4.4 03/22/2018           Lab Results   Component Value Date    MAG 2.1 03/22/2018            Lab Results   Component Value Date    CR 0.86 03/22/2018                   Lab Results   Component Value Date    ANNAMARIE 8.9 03/22/2018                Lab Results   Component Value Date    BILITOTAL 0.2 03/22/2018           Lab Results   Component Value Date    ALBUMIN 3.0 03/22/2018                    Lab Results   Component Value Date    ALT 31 03/22/2018           Lab Results   Component Value Date    AST 22 03/22/2018       Results reviewed, labs MET treatment parameters, ok to proceed with treatment.    Intravenous Access:  PICC.    Post Infusion Assessment:  Patient tolerated infusion without incident.  Blood return noted pre and post infusion.  No evidence of extravasations.    Prior to discharge: PICC is secured in place with tegaderm and flushed with 10cc NS with positive blood return noted-- pump hooked up to purple lumen.  Continuous home infusion Dosi-Fuser pump connected.    All connectors secured in place and clamps taped open.    Pump started, \"running\" noted on display (CADD): Not Applicable.  Patient instructed to call our clinic or [facility] with any questions or concerns at home.  Patient verbalized understanding.    Patient set up for pump disconnect at home " with [facility] on Saturday, March 24 @9am. Angela at [facility] aware, pump connections checked with Shari Andre RN    Discharge Plan:   Patient declined prescription refills.  Discharge instructions reviewed with: Patient and Family.  Patient and/or family verbalized understanding of discharge instructions and all questions answered.  AVS to patient via [employer].  Patient will return 4/5 for next appointment.   Patient discharged in stable condition accompanied by: self and mother.    Alis Mcfarland RN

## 2018-03-22 NOTE — MR AVS SNAPSHOT
After Visit Summary   3/22/2018    Sebas Lopez    MRN: [id number]           Patient Information     Date Of Birth          1965        Visit Information        Provider Department      3/22/2018 7:30 AM UC 13 ATC; UC ONCOLOGY INFUSION [facility]        Today's Diagnoses     Peritoneal carcinomatosis (H)    -  1    Cancer of sigmoid colon (H)          Care Instructions    Contact numbers:  Triage Main Line: [phone]  After hours: [phone]    Call with chills and/or temperature greater than or equal to 100.5 and questions or concerns.    If after hours, weekends, or holidays, call main hospital  at  [phone] and ask for Oncology doctor on call.           March 2018 Sunday Monday Tuesday Wednesday Thursday Friday Saturday                       1     [facility] LAB DRAW    7:15 AM   (15 min.)    M[facility]ONIC LAB DRAW   [facility] Lab Draw     UMP RETURN    7:35 AM   (50 min.)   Linda Alves PA-C   [facility] ONC INFUSION 240   10:30 AM   (240 min.)    ONCOLOGY INFUSION   [facility] 2     3       4     5     6     7     8     [facility] LAB DRAW   12:30 PM   (15 min.)    M[facility]ONIC LAB DRAW   [facility] Lab Draw     P RETURN   12:45 PM   (30 min.)   Albert Long MD   [facility] ONC INFUSION 240    1:30 PM   (240 min.)    ONCOLOGY INFUSION   [facility] 9     10       11     12     13     14     15     16     17       18     Admission    3:36 AM   Kaden Hardin MD   [address] [facility]   (Discharge: 3/20/2018)     CT ABDOMEN PELVIS W    6:10 AM   (30 min.)   [id number]   [facility], CT     XR COLON WATER SOLUBLE   12:25 PM   (60 min.)   UUXR5   [facility],  Radiology 19     COLONOSCOPY   11:15 AM   Alexis Rios MD   UU OR     XR SURG TALA <5 MIN FL W STILL   11:40 AM   (30 min.)   [id number]   [facility],   Radiology 20     21     22     [facility] LAB DRAW    6:30 AM   (15 min.)   UC [facility] LAB DRAW   [facility] Masonic Lab Draw     UMP RETURN    6:45 AM   (50 min.)   Linda Alves PA-C   [facility]     UMP ONC INFUSION 240    7:30 AM   (240 min.)   UC ONCOLOGY INFUSION   [facility] 23     24       25     26     27     28     29     UMP RETURN    4:00 PM   (45 min.)   Sukhdeep Mota MD   [facility] 30 31 April 2018 Sunday Monday Tuesday Wednesday Thursday Friday Saturday   1     2     3     4     5     [facility] LAB DRAW   10:30 AM   (15 min.)    MASONIC LAB DRAW   [facility] Mason[facility] Lab Draw     UMP RETURN   10:55 AM   (50 min.)   Linda Alves PA-C   [facility]     UMP ONC INFUSION 240   12:30 PM   (240 min.)    ONCOLOGY INFUSION   [facility] 6     7       8     9     10     11     12     13     14       15     16     17     18     19     [facility] LAB DRAW   10:15 AM   (15 min.)    MASONIC LAB DRAW   [facility] Lab Draw     UMP RETURN   10:55 AM   (50 min.)   Linda Alves PA-C   [facility]     UMP ONC INFUSION 240   12:30 PM   (240 min.)   UC ONCOLOGY INFUSION   [facility] 20     21       22     23     24     25     26     27     28       29     30                                           Lab Results:  Recent Results (from the past 12 hour(s))   Magnesium    Collection Time: 03/22/18  6:39 AM   Result Value Ref Range    Magnesium 2.1 1.6 - 2.3 mg/dL   Phosphorus    Collection Time: 03/22/18  6:39 AM   Result Value Ref Range    Phosphorus 5.0 (H) 2.5 - 4.5 mg/dL   CBC with platelets differential    Collection Time: 03/22/18  6:39 AM   Result Value Ref Range    WBC 9.7 4.0 - 11.0 10e9/L    RBC Count 3.39 (L) 4.4 - 5.9 10e12/L    Hemoglobin 9.6 (L) 13.3 - 17.7 g/dL    Hematocrit 30.3 (L) 40.0 - 53.0 %    MCV 89 78 - 100 fl     MCH 28.3 26.5 - 33.0 pg    MCHC 31.7 31.5 - 36.5 g/dL    RDW 16.6 (H) 10.0 - 15.0 %    Platelet Count 309 150 - 450 10e9/L    Diff Method Automated Method     % Neutrophils 49.8 %    % Lymphocytes 24.7 %    % Monocytes 14.9 %    % Eosinophils 9.7 %    % Basophils 0.6 %    % Immature Granulocytes 0.3 %    Nucleated RBCs 0 0 /100    Absolute Neutrophil 4.8 1.6 - 8.3 10e9/L    Absolute Lymphocytes 2.4 0.8 - 5.3 10e9/L    Absolute Monocytes 1.5 (H) 0.0 - 1.3 10e9/L    Absolute Eosinophils 0.9 (H) 0.0 - 0.7 10e9/L    Absolute Basophils 0.1 0.0 - 0.2 10e9/L    Abs Immature Granulocytes 0.0 0 - 0.4 10e9/L    Absolute Nucleated RBC 0.0    Comprehensive metabolic panel    Collection Time: 03/22/18  6:39 AM   Result Value Ref Range    Sodium 134 133 - 144 mmol/L    Potassium 4.4 3.4 - 5.3 mmol/L    Chloride 101 94 - 109 mmol/L    Carbon Dioxide 25 20 - 32 mmol/L    Anion Gap 8 3 - 14 mmol/L    Glucose 96 70 - 99 mg/dL    Urea Nitrogen 28 7 - 30 mg/dL    Creatinine 0.86 0.66 - 1.25 mg/dL    GFR Estimate >90 >60 mL/min/1.7m2    GFR Estimate If Black >90 >60 mL/min/1.7m2    Calcium 8.9 8.5 - 10.1 mg/dL    Bilirubin Total 0.2 0.2 - 1.3 mg/dL    Albumin 3.0 (L) 3.4 - 5.0 g/dL    Protein Total 7.9 6.8 - 8.8 g/dL    Alkaline Phosphatase 155 (H) 40 - 150 U/L    ALT 31 0 - 70 U/L    AST 22 0 - 45 U/L               Follow-ups after your visit        Your next 10 appointments already scheduled     Mar 29, 2018  4:15 PM CDT   (Arrive by 4:00 PM)   Return Visit with Sukhdeep Mota MD   [facility] ([facility])    [address] [place] [address] [phone]            Apr 05, 2018 10:30 AM CDT   [facility] Lab Draw with  MASONIC LAB DRAW   [facility] Masonic Lab Draw ([facility])    [address]  [address]  [facility] [address]   [phone]            Apr 05, 2018 11:10 AM CDT   (Arrive by 10:55 AM)   Return Visit with Linda  Melida Alves PA-C   [facility] ([facility])    [address]  [address]  [facility] [address]   [phone]            Apr 05, 2018 12:30 PM CDT   Infusion 240 with UC ONCOLOGY INFUSION, UC 26 ATC   [facility] ([facility])    [address]  [address]  [facility] [address] [phone]            Apr 19, 2018 10:15 AM CDT   [facility] Lab Draw with UC MASONIC LAB DRAW   [facility] Masonic Lab Draw ([facility])    [address]  [address]  [facility] [address] [phone]            Apr 19, 2018 11:10 AM CDT   (Arrive by 10:55 AM)   Return Visit with Linda Alves PA-C   [facility] ([facility])    [address]  [address]  [facility] [address] [phone]            Apr 19, 2018 12:30 PM CDT   Infusion 240 with UC ONCOLOGY INFUSION, UC 30 ATC   [facility] ([facility])    [address]  [address]  [facility] [address] [phone]            May 03, 2018 10:15 AM CDT   [facility] Lab Draw with UC MASONIC LAB DRAW   [facility] Masonic Lab Draw ([facility])    [address]  [address]  [facility] [id number] [phone]              Future tests that were ordered for you today     Open Future Orders        Priority Expected Expires Ordered    CT Chest/Abdomen/Pelvis w Contrast Routine 5/2/2018 3/22/2019 3/22/2018            Who to contact     If you have questions or need follow up information about today's clinic visit or your schedule please contact [facility] directly at [phone].  Normal or non-critical lab and imaging results will be communicated to you by MyChart, letter or phone within 4 business days after the clinic has received the results. If you do not hear from us within 7 days,  please contact the clinic through [facility] or phone. If you have a critical or abnormal lab result, we will notify you by phone as soon as possible.  Submit refill requests through [facility] or call your pharmacy and they will forward the refill request to us. Please allow 3 business days for your refill to be completed.          Additional Information About Your Visit        TumblrharOnePageCRM Information     [facility] gives you secure access to your electronic health record. If you see a primary care provider, you can also send messages to your care team and make appointments. If you have questions, please call your primary care clinic.  If you do not have a primary care provider, please call [phone] and they will assist you.        Care EveryWhere ID     This is your Care EveryWhere ID. This could be used by other organizations to access your [place] medical records  [id number]         Blood Pressure from Last 3 Encounters:   03/22/18 122/84   03/20/18 124/83   03/08/18 120/78    Weight from Last 3 Encounters:   03/22/18 67.6 kg (149 lb)   03/19/18 72.2 kg (159 lb 3.2 oz)   03/08/18 69.2 kg (152 lb 8 oz)              We Performed the Following     CBC with platelets differential     Comprehensive metabolic panel     Magnesium     Phosphorus          Today's Medication Changes          These changes are accurate as of 3/22/18  1:13 PM.  If you have any questions, ask your nurse or doctor.               These medicines have changed or have updated prescriptions.        Dose/Directions    * oxyCODONE 10 MG 12 hr tablet   Commonly known as:  OXYCONTIN   This may have changed:  Another medication with the same name was added. Make sure you understand how and when to take each.   Used for:  Peritoneal carcinomatosis (H), Cancer of sigmoid colon (H)   Changed by:  Linda Alves PA-C        Dose:  20 mg   Take 2 tablets (20 mg) by mouth every 12 hours   Quantity:  120 tablet   Refills:  0       * oxyCODONE 5 MG capsule    Commonly known as:  OXY-IR   This may have changed:  Another medication with the same name was added. Make sure you understand how and when to take each.   Used for:  Peritoneal carcinomatosis (H)   Changed by:  Linda Alves PA-C        Dose:  5-10 mg   Take 1-2 capsules (5-10 mg) by mouth every 4 hours as needed for moderate to severe pain   Quantity:  100 capsule   Refills:  0       * oxyCODONE IR 10 MG tablet   Commonly known as:  ROXICODONE   This may have changed:  You were already taking a medication with the same name, and this prescription was added. Make sure you understand how and when to take each.   Used for:  Cancer of sigmoid colon (H), Peritoneal carcinomatosis (H)   Changed by:  Linda Alves PA-C        Dose:  5-10 mg   Take 0.5-1 tablets (5-10 mg) by mouth every 4 hours as needed for moderate to severe pain   Quantity:  90 tablet   Refills:  0       * Notice:  This list has 3 medication(s) that are the same as other medications prescribed for you. Read the directions carefully, and ask your doctor or other care provider to review them with you.         Where to get your medicines      Some of these will need a paper prescription and others can be bought over the counter.  Ask your nurse if you have questions.     Bring a paper prescription for each of these medications     oxyCODONE IR 10 MG tablet                Primary Care Provider Office Phone # Fax #    Jaguar Becker [phone]       [facility] [address] [place] [address]        Equal Access to Services     [facility] AH: Hadii karrie ku hadasho Soomaali, waaxda luqadaha, qaybta kaalmada adeegyada, bhargav willis. So [facility] [phone].    ATENCIÓN: Si habla español, tiene a cid disposición servicios gratuitos de asistencia lingüística. Llame al [phone].    We comply with applicable federal civil rights laws and [place] laws. We do not discriminate on the basis of  race, color, national origin, age, disability, sex, sexual orientation, or gender identity.            Thank you!     Thank you for choosing [facility]  for your care. Our goal is always to provide you with excellent care. Hearing back from our patients is one way we can continue to improve our services. Please take a few minutes to complete the written survey that you may receive in the mail after your visit with us. Thank you!             Your Updated Medication List - Protect others around you: Learn how to safely use, store and throw away your medicines at www.disposemymeds.org.          This list is accurate as of 3/22/18  1:13 PM.  Always use your most recent med list.                   Brand Name Dispense Instructions for use Diagnosis    amylase-lipase-protease 34440 UNITS Cpep    CREON    240 capsule    Take 2 capsules (72,000 Units) by mouth 3 times daily (with meals) And 1 capsule with each snack tid.    Diarrhea, unspecified type, Peritoneal carcinomatosis (H), Cancer of sigmoid colon (H)       IRON SUPPLEMENT PO      Take 325 mg by mouth 2 times daily (with meals)        LORazepam 0.5 MG tablet    ATIVAN    30 tablet    Take 1 tablet (0.5 mg) by mouth every 4 hours as needed (Anxiety, Nausea/Vomiting or Sleep)    Peritoneal carcinomatosis (H), Cancer of sigmoid colon (H)       MULTIVITAMIN & MINERAL PO      Take 1 tablet by mouth daily        omeprazole 20 MG CR capsule    priLOSEC    30 capsule    Take 1 capsule (20 mg) by mouth daily    Diarrhea, unspecified type       ondansetron 8 MG ODT tab    ZOFRAN-ODT    60 tablet    Take 1 tablet (8 mg) by mouth every 8 hours    Nausea       * oxyCODONE 10 MG 12 hr tablet    OXYCONTIN    120 tablet    Take 2 tablets (20 mg) by mouth every 12 hours    Peritoneal carcinomatosis (H), Cancer of sigmoid colon (H)       * oxyCODONE 5 MG capsule    OXY-IR    100 capsule    Take 1-2 capsules (5-10 mg) by mouth every 4 hours as needed for moderate to  severe pain    Peritoneal carcinomatosis (H)       * oxyCODONE IR 10 MG tablet    ROXICODONE    90 tablet    Take 0.5-1 tablets (5-10 mg) by mouth every 4 hours as needed for moderate to severe pain    Cancer of sigmoid colon (H), Peritoneal carcinomatosis (H)       parenteral nutrition - PTA/DISCHARGE ORDER      Inject into the vein daily See medication history note from 2/20/18 for most recent TPN formula.        Psyllium 51.7 % Pack    METAMUCIL FIBER    90 each    Take 1 packet by mouth 3 times daily    Diarrhea, unspecified type       * Notice:  This list has 3 medication(s) that are the same as other medications prescribed for you. Read the directions carefully, and ask your doctor or other care provider to review them with you.

## 2018-03-22 NOTE — PATIENT INSTRUCTIONS
Contact numbers:  Triage Main Line: [phone]  After hours: [phone]    Call with chills and/or temperature greater than or equal to 100.5 and questions or concerns.    If after hours, weekends, or holidays, call main hospital  at  [phone] and ask for Oncology doctor on call.           March 2018 Sunday Monday Tuesday Wednesday Thursday Friday Saturday                       1     P [facility] LAB DRAW    7:15 AM   (15 min.)   UC [facility]ON[facility] LAB DRAW   [facility] Masonic Lab Draw     UMP RETURN    7:35 AM   (50 min.)   Linda Alves PA-C   [facility] ONC INFUSION 240   10:30 AM   (240 min.)   UC ONCOLOGY INFUSION   [facility] 2     3       4     5     6     7     8     UMP [facility] LAB DRAW   12:30 PM   (15 min.)   UC [facility] LAB DRAW   [facility] Lab Draw     UMP RETURN   12:45 PM   (30 min.)   Albert Long MD   [facility] ONC INFUSION 240    1:30 PM   (240 min.)   UC ONCOLOGY INFUSION   [facility] 9     10       11     12     13     14     15     16     17       18     Admission    3:36 AM   Kaden Hardin MD   [address] [facility]   (Discharge: 3/20/2018)     CT ABDOMEN PELVIS W    6:10 AM   (30 min.)   UUCT1   [facility], [place]     XR COLON WATER SOLUBLE   12:25 PM   (60 min.)   UUXR5   [facility],  Radiology 19     COLONOSCOPY   11:15 AM   Alexis Rios MD   UU OR     XR SURG TALA <5 MIN FL W STILL   11:40 AM   (30 min.)   [id number]   [facility],  Radiology 20     21     22     P [facility]ONIC LAB DRAW    6:30 AM   (15 min.)   UC MASONIC LAB DRAW   [facility] Masonic Lab Draw     UMP RETURN    6:45 AM   (50 min.)   Linda Alves PA-C   [facility] ONC INFUSION 240    7:30 AM   (240 min.)   UC ONCOLOGY INFUSION   [facility] 23     24       25     26     27     28     29     UMP RETURN    4:00 PM   (45 min.)    Sukhdeep Mota MD   [facility] 30 31 April 2018 Sunday Monday Tuesday Wednesday Thursday Friday Saturday   1     2     3     4     5     [facility] MASONIC LAB DRAW   10:30 AM   (15 min.)    [facility]     UMP RETURN   10:55 AM   (50 min.)   Linda Alves PA-C   [facility]   12:30 PM   (240 min.)    ONCOLOGY INFUSION   [facility] 6     7       8     9     10     11     12     13     14       15     16     17     18     19     [facility] MASONIC LAB DRAW   10:15 AM   (15 min.)    [facility] LAB [facility]RAW   [facility]     UMP RETURN   10:55 AM   (50 min.)   Linda Alves PA-C   [facility]   12:30 PM   (240 min.)    ONCOLOGY INFUSION   [facility] 20     21       22     23     24     25     26     27     28       29     30                                           Lab Results:  Recent Results (from the past 12 hour(s))   Magnesium    Collection Time: 03/22/18  6:39 AM   Result Value Ref Range    Magnesium 2.1 1.6 - 2.3 mg/dL   Phosphorus    Collection Time: 03/22/18  6:39 AM   Result Value Ref Range    Phosphorus 5.0 (H) 2.5 - 4.5 mg/dL   CBC with platelets differential    Collection Time: 03/22/18  6:39 AM   Result Value Ref Range    WBC 9.7 4.0 - 11.0 10e9/L    RBC Count 3.39 (L) 4.4 - 5.9 10e12/L    Hemoglobin 9.6 (L) 13.3 - 17.7 g/dL    Hematocrit 30.3 (L) 40.0 - 53.0 %    MCV 89 78 - 100 fl    MCH 28.3 26.5 - 33.0 pg    MCHC 31.7 31.5 - 36.5 g/dL    RDW 16.6 (H) 10.0 - 15.0 %    Platelet Count 309 150 - 450 10e9/L    Diff Method Automated Method     % Neutrophils 49.8 %    % Lymphocytes 24.7 %    % Monocytes 14.9 %    % Eosinophils 9.7 %    % Basophils 0.6 %    % Immature Granulocytes 0.3 %    Nucleated RBCs 0 0 /100    Absolute Neutrophil 4.8 1.6 - 8.3 10e9/L    Absolute Lymphocytes 2.4 0.8 - 5.3  10e9/L    Absolute Monocytes 1.5 (H) 0.0 - 1.3 10e9/L    Absolute Eosinophils 0.9 (H) 0.0 - 0.7 10e9/L    Absolute Basophils 0.1 0.0 - 0.2 10e9/L    Abs Immature Granulocytes 0.0 0 - 0.4 10e9/L    Absolute Nucleated RBC 0.0    Comprehensive metabolic panel    Collection Time: 03/22/18  6:39 AM   Result Value Ref Range    Sodium 134 133 - 144 mmol/L    Potassium 4.4 3.4 - 5.3 mmol/L    Chloride 101 94 - 109 mmol/L    Carbon Dioxide 25 20 - 32 mmol/L    Anion Gap 8 3 - 14 mmol/L    Glucose 96 70 - 99 mg/dL    Urea Nitrogen 28 7 - 30 mg/dL    Creatinine 0.86 0.66 - 1.25 mg/dL    GFR Estimate >90 >60 mL/min/1.7m2    GFR Estimate If Black >90 >60 mL/min/1.7m2    Calcium 8.9 8.5 - 10.1 mg/dL    Bilirubin Total 0.2 0.2 - 1.3 mg/dL    Albumin 3.0 (L) 3.4 - 5.0 g/dL    Protein Total 7.9 6.8 - 8.8 g/dL    Alkaline Phosphatase 155 (H) 40 - 150 U/L    ALT 31 0 - 70 U/L    AST 22 0 - 45 U/L

## 2018-03-22 NOTE — MR AVS SNAPSHOT
After Visit Summary   3/22/2018    Sebas Lopez    MRN: [id number]           Patient Information     Date Of Birth          1965        Visit Information        Provider Department      3/22/2018 7:00 AM Linda Alves PA-C [facility]        Today's Diagnoses     Cancer of sigmoid colon (H)    -  1    Peritoneal carcinomatosis (H)           Follow-ups after your visit        Your next 10 appointments already scheduled     Mar 29, 2018  4:15 PM CDT   (Arrive by 4:00 PM)   Return Visit with Sukhdeep Mota MD   [facility] ([facility])    [address] [place] [address]  [address]  [facility] [address]   [phone]            Apr 05, 2018 10:30 AM CDT   [facility] Lab Draw with  MASONIC LAB DRAW   [facility] Draw ([facility])    [address]  [address]  [facility] [address]   [phone]            Apr 05, 2018 11:10 AM CDT   (Arrive by 10:55 AM)   Return Visit with Linda Alves PA-C   [facility] ([facility])    [address]  [address]  [facility] [address]   [phone]            Apr 05, 2018 12:30 PM CDT   Infusion 240 with UC ONCOLOGY INFUSION, UC 26 ATC   [facility] ([facility])    [address]  [address]  [facility] [address]   [phone]            Apr 19, 2018 10:15 AM CDT   Masonic Lab Draw with UC MASONIC LAB DRAW   [facility] Draw ([facility])    [address] [place] [address]  [address]  [facility] [address]   [phone]            Apr 19, 2018 11:10 AM CDT   (Arrive by 10:55 AM)   Return Visit with Linda Alves PA-C   [facility] ([facility])    [address]  [address]  [facility] [address]   [phone]            Apr 19, 2018 12:30 PM CDT   Infusion  240 with  ONCOLOGY INFUSION, UC 30 ATC   [facility] ([facility])    [address]  [address]  [facility] [address] [phone]            May 03, 2018 10:15 AM CDT   Masonic Lab Draw with  MASONIC LAB DRAW   [facility] Lab Draw ([facility])    [address]  [address]  [facility] [address] [phone]              Future tests that were ordered for you today     Open Future Orders        Priority Expected Expires Ordered    CT Chest/Abdomen/Pelvis w Contrast Routine 5/2/2018 3/22/2019 3/22/2018            Who to contact     If you have questions or need follow up information about today's clinic visit or your schedule please contact [facility] directly at [phone].  Normal or non-critical lab and imaging results will be communicated to you by Treeveohart, letter or phone within 4 business days after the clinic has received the results. If you do not hear from us within 7 days, please contact the clinic through Footnotet or phone. If you have a critical or abnormal lab result, we will notify you by phone as soon as possible.  Submit refill requests through Zefanclub or call your pharmacy and they will forward the refill request to us. Please allow 3 business days for your refill to be completed.          Additional Information About Your Visit        MyChart Information     Zefanclub gives you secure access to your electronic health record. If you see a primary care provider, you can also send messages to your care team and make appointments. If you have questions, please call your primary care clinic.  If you do not have a primary care provider, please call [phone] and they will assist you.        Care EveryWhere ID     This is your Care EveryWhere ID. This could be used by other organizations to access your [facility] medical records  [id number]        Your Vitals Were     Pulse  Temperature Respirations Pulse Oximetry BMI (Body Mass Index)       98 98.7  F (37.1  C) (Oral) 16 98% 22 kg/m2        Blood Pressure from Last 3 Encounters:   03/22/18 122/84   03/20/18 124/83   03/08/18 120/78    Weight from Last 3 Encounters:   03/22/18 67.6 kg (149 lb)   03/19/18 72.2 kg (159 lb 3.2 oz)   03/08/18 69.2 kg (152 lb 8 oz)                 Today's Medication Changes          These changes are accurate as of 3/22/18  9:00 AM.  If you have any questions, ask your nurse or doctor.               These medicines have changed or have updated prescriptions.        Dose/Directions    * oxyCODONE 10 MG 12 hr tablet   Commonly known as:  OXYCONTIN   This may have changed:  Another medication with the same name was added. Make sure you understand how and when to take each.   Used for:  Peritoneal carcinomatosis (H), Cancer of sigmoid colon (H)   Changed by:  Linda Alves PA-C        Dose:  20 mg   Take 2 tablets (20 mg) by mouth every 12 hours   Quantity:  120 tablet   Refills:  0       * oxyCODONE 5 MG capsule   Commonly known as:  OXY-IR   This may have changed:  Another medication with the same name was added. Make sure you understand how and when to take each.   Used for:  Peritoneal carcinomatosis (H)   Changed by:  Linda Alves PA-C        Dose:  5-10 mg   Take 1-2 capsules (5-10 mg) by mouth every 4 hours as needed for moderate to severe pain   Quantity:  100 capsule   Refills:  0       * oxyCODONE IR 10 MG tablet   Commonly known as:  ROXICODONE   This may have changed:  You were already taking a medication with the same name, and this prescription was added. Make sure you understand how and when to take each.   Used for:  Cancer of sigmoid colon (H), Peritoneal carcinomatosis (H)   Changed by:  Linda Alves PA-C        Dose:  5-10 mg   Take 0.5-1 tablets (5-10 mg) by mouth every 4 hours as needed for moderate to severe pain   Quantity:  90 tablet   Refills:  0       * Notice:   This list has 3 medication(s) that are the same as other medications prescribed for you. Read the directions carefully, and ask your doctor or other care provider to review them with you.         Where to get your medicines      Some of these will need a paper prescription and others can be bought over the counter.  Ask your nurse if you have questions.     Bring a paper prescription for each of these medications     oxyCODONE IR 10 MG tablet                Primary Care Provider Office Phone # Fax #    Jaguar Becker [phone]       [facility] [address] [place] [address] [address]  [facility] [address]        Equal Access to Services     [facility]: Hadii aad ku hadasho Soomaali, waaxda luqadaha, qaybta kaalmada lewis, bhargav sage . So [facility] [phone].    ATENCIÓN: Si habla español, tiene a cid disposición servicios gratuitos de asistencia lingüística. [facility] [phone].    We comply with applicable federal civil rights laws and [place] laws. We do not discriminate on the basis of race, color, national origin, age, disability, sex, sexual orientation, or gender identity.            Thank you!     Thank you for choosing [facility]  for your care. Our goal is always to provide you with excellent care. Hearing back from our patients is one way we can continue to improve our services. Please take a few minutes to complete the written survey that you may receive in the mail after your visit with us. Thank you!             Your Updated Medication List - Protect others around you: Learn how to safely use, store and throw away your medicines at www.disposemymeds.org.          This list is accurate as of 3/22/18  9:00 AM.  Always use your most recent med list.                   Brand Name Dispense Instructions for use Diagnosis    amylase-lipase-protease 87933 UNITS Cpep    CREON    240 capsule    Take 2 capsules (72,000 Units) by mouth 3 times daily (with  meals) And 1 capsule with each snack tid.    Diarrhea, unspecified type, Peritoneal carcinomatosis (H), Cancer of sigmoid colon (H)       IRON SUPPLEMENT PO      Take 325 mg by mouth 2 times daily (with meals)        LORazepam 0.5 MG tablet    ATIVAN    30 tablet    Take 1 tablet (0.5 mg) by mouth every 4 hours as needed (Anxiety, Nausea/Vomiting or Sleep)    Peritoneal carcinomatosis (H), Cancer of sigmoid colon (H)       MULTIVITAMIN & MINERAL PO      Take 1 tablet by mouth daily        omeprazole 20 MG CR capsule    priLOSEC    30 capsule    Take 1 capsule (20 mg) by mouth daily    Diarrhea, unspecified type       ondansetron 8 MG ODT tab    ZOFRAN-ODT    60 tablet    Take 1 tablet (8 mg) by mouth every 8 hours    Nausea       * oxyCODONE 10 MG 12 hr tablet    OXYCONTIN    120 tablet    Take 2 tablets (20 mg) by mouth every 12 hours    Peritoneal carcinomatosis (H), Cancer of sigmoid colon (H)       * oxyCODONE 5 MG capsule    OXY-IR    100 capsule    Take 1-2 capsules (5-10 mg) by mouth every 4 hours as needed for moderate to severe pain    Peritoneal carcinomatosis (H)       * oxyCODONE IR 10 MG tablet    ROXICODONE    90 tablet    Take 0.5-1 tablets (5-10 mg) by mouth every 4 hours as needed for moderate to severe pain    Cancer of sigmoid colon (H), Peritoneal carcinomatosis (H)       parenteral nutrition - PTA/DISCHARGE ORDER      Inject into the vein daily See medication history note from 2/20/18 for most recent TPN formula.        Psyllium 51.7 % Pack    METAMUCIL FIBER    90 each    Take 1 packet by mouth 3 times daily    Diarrhea, unspecified type       * Notice:  This list has 3 medication(s) that are the same as other medications prescribed for you. Read the directions carefully, and ask your doctor or other care provider to review them with you.

## 2018-03-23 NOTE — PROGRESS NOTES
This is a recent snapshot of the patient's [facility] medical record.  For current drug dose and complete information and questions, call [phone] or In Basket pool, fv home infusion ([id number])  CSN Number:  [id number]

## 2018-03-26 NOTE — PROGRESS NOTES
This is a recent snapshot of the patient's [facility] medical record.  For current drug dose and complete information and questions, call [phone] or In Basket pool, fv home infusion ([id number])  CSN Number:  [id number]

## 2018-03-29 NOTE — NURSING NOTE
"Oncology Rooming Note    March 29, 2018 3:57 PM   Sebas Lopez is a 50 year old male who presents for: Oncology Clinic Visit    Initial Vitals: /81 (BP Location: Left arm, Patient Position: Chair, Cuff Size: Adult Regular)  Pulse 110  Temp 98.2  F (36.8  C) (Oral)  Resp 16  Ht 1.753 m (5' 9.02\")  Wt 65 kg (143 lb 3.2 oz)  SpO2 98%  BMI 21.14 kg/m2 Estimated body mass index is 21.14 kg/(m^2) as calculated from the following:    Height as of this encounter: 1.753 m (5' 9.02\").    Weight as of this encounter: 65 kg (143 lb 3.2 oz). Body surface area is 1.78 meters squared.  No Pain (0) Comment: Data Unavailable   No LMP for male patient.  Allergies reviewed: Yes  Medications reviewed: Yes    Medications: MEDICATION REFILLS NEEDED TODAY. Provider was NOT notified.  Pharmacy name entered into [facility]: [facility] [place] - [address]    Clinical concerns:refills needed on oxycontin.  I informed pt to remind the Provider/ELIF about refills in case i dont touch bases with them, if the provider was in the exam room while i attend on rooming the next pt. Pt verbalized understandings.  Carlie De La Paz CMA      6 minutes for nursing intake (face to face time)     Carlie De La Paz CMA                              "

## 2018-03-29 NOTE — PROGRESS NOTES
Palliative Care Outpatient Clinic Progress Note    Patient Name:  Sebas Lopez  Primary Provider:  Jaguar Becker    Chief Complaint: follow up for abdominal pain.     Interim History:  Sebas Lopez 52 year old male with h/o UC c/b sigmoid adenocarcinoma with peritoneal mets s/p small bowel resection with ileostomy with adjuvant chemotherapy who returns to be seen by palliative care today, with girlfriend Bessie.    Recent hospitalization summary:   3/18/18: Developed 2 days of worsening LLQ abdominal pain with decreased ileostomy output with CT demonstrating bowel obstruction with sigmoid obstructing tumor s/p stenting 3/19.    2/20/18: hospitalized with bowel wall thickening concerning for infectious colitis with Vanc/Zosyn and Blood cultures growing MSSE and later cultures from port showed GpB with continued Vanco & port removal.  2/5/18: admitted with generalized weakness, leukocytosis, abdominal pain with CT showing colonic mass with distension.  Colorectal surgery evaluated and recommended cecostomy and GI recommended colonic stent placed 2/6 with improved symptoms.      Since our last visit he was hospitalized 2/5 with obstructing bowel mass s/p stent, rehospitalized 2/20-2/25 with infectious colitis/bacteremia & port removal with discharge on vanco, and again rehospitalized for bowel obstruction s/p repeat colon stent on 3/19/18.    He started back on chemo on Thursday and is due for another course next Thursday.  He has decreased appetite but denied neuropathy.      He notes dysgusia with decreased appetite.  Smells things and will get occasionally queezy and nauseated with vomiting recently.  In the past couple of days he has eaten about 2-3 meals per day (~3022-7154 calories) and had been taking about 1000 calories early this week.      Since colon stent placement he has been having at least 2 ostomy empties per day.  His octreotide, opium tincture, and loperamide have been on hold since the  "colonic stent in 2/2018 and he has noted only mild decrease in output after starting chemo (but was associated with decreased PO intake).     He will occasionally note distension of his abdomen that recently has been progressive requiring restenting of his colon.  He was told they could try to empty out his colon if he was noting back up.      His pain has been less since his most recent hospital stay.  He has continued to take oxycontin 20 mg bid and oxycodone 10 mg prn (taking 3-4 times daily).  Pain primarily over lower abdomen with occasional radiation to LLQ into his L lower back.  Pain is often slowly building with associated lethargy that will help jasson with oxycodone after about an hour.      His sleep has been good and sleeping 6-8 hours overnight in stretches of 3-4 hours.  Occasionally wakes up with pain.  He also has TPN running at night at well that wakes him as well.  He feels rested in the morning.  He feels pain is more prominent at night with him taking a dose before bedtime and when he wakes up.     Sebas was previously interested in knowing his prognosis, but he has changed his opinion of this.  He does not want to know his prognosis at this time.      Social History:  Key support resources: Girlfriend Bessie and family.  Advance Directive Status:  [facility] in process of being completed  Social History   Substance Use Topics     Smoking status: Never Smoker     Smokeless tobacco: Never Used     Alcohol use 3.0 oz/week     5 Cans of beer per week      Comment: none for last 4 months     Review of Systems: ROS: 10 point ROS neg other than the symptoms noted above in the HPI.          Physical Exam:   Vitals were reviewed  /81 (BP Location: Left arm, Patient Position: Chair, Cuff Size: Adult Regular)  Pulse 110  Temp 98.2  F (36.8  C) (Oral)  Resp 16  Ht 1.753 m (5' 9.02\")  Wt 65 kg (143 lb 3.2 oz)  SpO2 98%  BMI 21.14 kg/m2  General: Alert, comfortable, thin appearing male in no acute " distress.   Eyes: Pupils equal, sclera clear.   ENT: MMM. No ulcerations or plaques.   Cardiac: Normal rate, regular rhythm, no murmurs.    Resp: CTAB, unlabored on room air without w/r/r.   Abd: Soft, non-distended, non-tender to palpation, ileostomy in place over RLQ, normoactive bowel sounds.   Ext: Warm and well perfused.  No pedal edema.   Neuro: No facial asymmetry.  Spontaneous movements grossly non-focal.   Neuropsych: Alert, appropriately interactive, full affect.    Impression & Recommendations & Counseling:  Peritoneal Carcinomatosis  Abdominal pain  Patient with two recent hospitalization for bowel obstruction s/p colonic stent x2 with noted improvement in bowel transit but with repeat episodes of worsened pain with passage of long acting opiate in his stool with fast transit bowel.  At this time we discussed transition to fentanyl patch to bypass the GI tract and detailed close monitoring of his pain with this patch for the first few days with continued oxycodone for short acting pain control.    Anorexia  Patient having anorexia likely 2/2 his chemotherapy with a qow regimen.  At this point he is eating ~7949-5102 calories daily, but given his plan for repeat chemo next Thursday will write for prn dronabinol 2.5 mg bid to help with appetite.  Also discussed megace, but will hold for now given DVT risk factor.      RTC 6 weeks for a follow up.     Additional information reviewed today:   Allergies   Allergen Reactions     Liquid Adhesive Rash     Dermabond, rash with pustules, occurred with abdominal surgery and port removal      Current Outpatient Prescriptions   Medication Sig Dispense Refill     oxyCODONE IR (ROXICODONE) 10 MG tablet Take 0.5-1 tablets (5-10 mg) by mouth every 4 hours as needed for moderate to severe pain 90 tablet 0     ondansetron (ZOFRAN-ODT) 8 MG ODT tab Take 1 tablet (8 mg) by mouth every 8 hours 60 tablet 0     Multiple Vitamins-Minerals (MULTIVITAMIN & MINERAL PO) Take 1  tablet by mouth daily       oxyCODONE (OXY-IR) 5 MG capsule Take 1-2 capsules (5-10 mg) by mouth every 4 hours as needed for moderate to severe pain 100 capsule 0     parenteral nutrition - PTA/DISCHARGE ORDER Inject into the vein daily See medication history note from 2/20/18 for most recent TPN formula.       oxyCODONE (OXYCONTIN) 10 MG 12 hr tablet Take 2 tablets (20 mg) by mouth every 12 hours 120 tablet 0     omeprazole (PRILOSEC) 20 MG CR capsule Take 1 capsule (20 mg) by mouth daily 30 capsule 11     amylase-lipase-protease (CREON) 66746 UNITS CPEP Take 2 capsules (72,000 Units) by mouth 3 times daily (with meals) And 1 capsule with each snack tid. 240 capsule 3     LORazepam (ATIVAN) 0.5 MG tablet Take 1 tablet (0.5 mg) by mouth every 4 hours as needed (Anxiety, Nausea/Vomiting or Sleep) 30 tablet 5     Psyllium (METAMUCIL FIBER) 51.7 % PACK Take 1 packet by mouth 3 times daily 90 each 3     Ferrous Sulfate (IRON SUPPLEMENT PO) Take 325 mg by mouth 2 times daily (with meals)        Past Medical History:   Diagnosis Date     Adenocarcinoma of sigmoid colon (H)     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     Ulcerative colitis (H)      Past Surgical History:   Procedure Laterality Date     COLONOSCOPY  2017     COLONOSCOPY N/A 11/30/2017    Procedure: COLONOSCOPY;  Colonoscopy;  Surgeon: Rob Dudley MD;  Location: [facility]     COLONOSCOPY N/A 2/6/2018    Procedure: COMBINED COLONOSCOPY, STENT PLACEMENT;  COMBINED COLONOSCOPY with Colonic Stent Placement;  Surgeon: Guru Lionel Mar MD;  Location: [facility] OR     COLONOSCOPY N/A 3/19/2018    Procedure: COMBINED COLONOSCOPY, STENT PLACEMENT;  flexible sigmoidoscopy with stent placement and dilation;  Surgeon: Alexis Rios MD;   Location: [facility] OR     GI SURGERY  07/10/2017    Extensive lysis of adhesions, small bowel resection with end ileostomy.      HERNIA REPAIR       INSERT PORT VASCULAR ACCESS Right 8/10/2017    Procedure: INSERT PORT VASCULAR ACCESS;  Single Lumen Right Chest Power Port;  Surgeon: Malena Andrew PA-C;  [facility]     LAPAROSCOPY DIAGNOSTIC (GENERAL) N/A 12/6/2017    Procedure: LAPAROSCOPY DIAGNOSTIC (GENERAL);  Diagnostic Laparoscopy, Exploratory Laparotomy Anesthesia Block ;  Surgeon: Rob Dudley MD;  Location: [facility]     LAPAROTOMY EXPLORATORY N/A 12/6/2017    Procedure: LAPAROTOMY EXPLORATORY;;  Surgeon: Rob Dudley MD;  Location: [facility]     ORTHOPEDIC SURGERY Left     HIP ARTHROPLASTY     PICC INSERTION Right 02/23/2018    5Fr DL BioFlo PICC, 44cm (3cm external) in the R basilic vein w/ tip in the SVC RA junction     Key Data Reviewed:  LABS:   Lab Results   Component Value Date    WBC 9.7 03/22/2018    HGB 9.6 (L) 03/22/2018    HCT 30.3 (L) 03/22/2018     03/22/2018     03/22/2018    POTASSIUM 4.4 03/22/2018    CHLORIDE 101 03/22/2018    CO2 25 03/22/2018    BUN 28 03/22/2018    CR 0.86 03/22/2018    GLC 96 03/22/2018    SED 82 03/06/2018    AST 22 03/22/2018    ALT 31 03/22/2018    ALKPHOS 155 (H) 03/22/2018    BILITOTAL 0.2 03/22/2018    INR 1.12 03/18/2018     IMAGING:   CT Abd/Pelvis (3/18/18):  1. Pan colonic distention concerning for distal large bowel  obstruction. Transition point likely related to sigmoid stent which is  more narrowed on this exam and appears less contiguous with the  rectum.   2. Stable peritoneal thickening and nodularity with small volume  abdominal free fluid likely related to peritoneal carcinomatosis.  3. Stable prominent mesenteric and retroperitoneal lymph nodes which  may be metastatic or reactive.    [place]  - Reviewed    Thank you for continuing to involve me in the care of this patient.     Sukhdeep Mota DO / Palliative Medicine  Fellow / Pager [phone] / After-Hours Answering Service [phone] / [facility] [phone] / [facility] Inpatient Team Consult Pager [phone] (answered 8am-430pm M-F)    Attending Physician Attestation    Patient seen & evaluated with Dr Mota. I agree with and confirm jenkins findings in his note.    Tarah Rome MD / Palliative Medicine / Pager [phone] / After-Hours Answering Service [phone] / [facility] [phone] / [facility] Inpatient Team Consult Pager [phone] (answered 8am-430pm M-F)

## 2018-03-29 NOTE — PROGRESS NOTES
This is a recent snapshot of the patient's [facility] medical record.  For current drug dose and complete information and questions, call [phone] or In Basket pool, fv home infusion ([id number])  CSN Number:  [id number]

## 2018-03-29 NOTE — PATIENT INSTRUCTIONS
Apply the fentanyl patch once every 72 hours with rotation to a new site each time to prevent skin irritation  Do not operate a vehicle within the first 5 days after starting this new patch  Take your last oxycontin 20 mg dose prior to application of patch for the first time.  You will stop your oxycontin after you apply your fentanyl patch    You can continue your 10 mg oxycodone while using the fentanyl patch as ordered.    We have also ordered dronabinol 2.5 mg twice daily as needed for lack of appetite    Come back in 6 weeks with your completed, but not signed healthcare directive.

## 2018-03-29 NOTE — MR AVS SNAPSHOT
After Visit Summary   3/29/2018    Sebas Lopez    MRN: [id number]           Patient Information     Date Of Birth          1965        Visit Information        Provider Department      3/29/2018 4:15 PM Sukhdeep Mota MD [facility]        Today's Diagnoses     Anorexia    -  1    Peritoneal carcinomatosis (H)        Cancer of sigmoid colon (H)          Care Instructions    Apply the fentanyl patch once every 72 hours with rotation to a new site each time to prevent skin irritation  Do not operate a vehicle within the first 5 days after starting this new patch  Take your last oxycontin 20 mg dose prior to application of patch for the first time.  You will stop your oxycontin after you apply your fentanyl patch    You can continue your 10 mg oxycodone while using the fentanyl patch as ordered.    We have also ordered dronabinol 2.5 mg twice daily as needed for lack of appetite    Come back in 6 weeks with your completed, but not signed healthcare directive.              Follow-ups after your visit        Follow-up notes from your care team     Return in about 6 weeks (around 5/10/2018).      Your next 10 appointments already scheduled     Apr 05, 2018 10:30 AM CDT   [facility] Lab Draw with UC MAS[facility]IC LAB DRAW   [facility] ([facility])    [address] [place] [address]  [address]  [facility] [address]   [phone]            Apr 05, 2018 11:10 AM CDT   (Arrive by 10:55 AM)   Return Visit with iLnda Alves PA-C   [facility] ([facility])    [address] [place] [address]  [address]  [facility] [address]   [phone]            Apr 05, 2018 12:30 PM CDT   Infusion 240 with  ONCOLOGY INFUSION,  26 ATC   [facility] ([facility])    [address] [place] [address]  [address]  [facility] [address]   [phone]            Apr 19, 2018 10:15 AM CDT    Masonic Lab Draw with  MASONIC LAB DRAW   [facility] Lab Draw ([facility])    [address]  [address]  [facility] [address]   [phone]            Apr 19, 2018 11:10 AM CDT   (Arrive by 10:55 AM)   Return Visit with Linda Alves PA-C   [facility] ([facility])    [address]  [address]  [facility] [address]   [phone]            Apr 19, 2018 12:30 PM CDT   Infusion 240 with UC ONCOLOGY INFUSION, UC 30 ATC   [facility] ([facility])    [address]  [address]  [facility] [address]   [phone]            May 03, 2018 10:15 AM CDT   Masonic Lab Draw with  MASONIC LAB DRAW   [facility] Draw ([facility])    [address]  [address]  [facility] [address]   [phone]            May 03, 2018 11:00 AM CDT   (Arrive by 10:45 AM)   CT CHEST/ABDOMEN/PELVIS W CONTRAST with UCCT1   [facility] [place] [facility] ([facility])    [address]  1[address]  [facility] [address]   [phone]           Please bring any scans or X-rays taken at other hospitals, if similar tests were done. Also bring a list of your medicines, including vitamins, minerals and over-the-counter drugs. It is safest to leave personal items at home.  Be sure to tell your doctor:   If you have any allergies.   If there s any chance you are pregnant.   If you are breastfeeding.  You may have contrast for this exam. To prepare:   Do not eat or drink for 2 hours before your exam. If you need to take medicine, you may take it with small sips of water. (We may ask you to take liquid medicine as well.)   The day before your exam, drink extra fluids at least six 8-ounce glasses (unless your doctor tells you to restrict your fluids).   You will be given instructions on how to drink the contrast.  Patients  over 70 or patients with diabetes or kidney problems:   If you haven t had a blood test (creatinine test) within the last 30 days, the Cardiologist/Radiologist may require you to get this test prior to your exam.  If you have diabetes:   Continue to take your metformin medication on the day of your exam  Please wear loose clothing, such as a sweat suit or jogging clothes. Avoid snaps, zippers and other metal. We may ask you to undress and put on a hospital gown.  If you have any questions, please call the Imaging Department where you will have your exam.              Who to contact     If you have questions or need follow up information about today's clinic visit or your schedule please contact [facility] directly at [phone].  Normal or non-critical lab and imaging results will be communicated to you by first[employer]RE[employer], letter or phone within 4 business days after the clinic has received the results. If you do not hear from us within 7 days, please contact the clinic through [employer] or phone. If you have a critical or abnormal lab result, we will notify you by phone as soon as possible.  Submit refill requests through [employer] or call your pharmacy and they will forward the refill request to us. Please allow 3 business days for your refill to be completed.          Additional Information About Your Visit        [employer] Information     [employer] gives you secure access to your electronic health record. If you see a primary care provider, you can also send messages to your care team and make appointments. If you have questions, please call your primary care clinic.  If you do not have a primary care provider, please call [phone] and they will assist you.        Care EveryWhere ID     This is your Care EveryWhere ID. This could be used by other organizations to access your [facility] medical records  [id number]        Your Vitals Were     Pulse Temperature Respirations Height Pulse Oximetry BMI (Body  "Mass Index)    110 98.2  F (36.8  C) (Oral) 16 1.753 m (5' 9.02\") 98% 21.14 kg/m2       Blood Pressure from Last 3 Encounters:   03/29/18 120/81   03/22/18 122/84   03/20/18 124/83    Weight from Last 3 Encounters:   03/29/18 65 kg (143 lb 3.2 oz)   03/22/18 67.6 kg (149 lb)   03/19/18 72.2 kg (159 lb 3.2 oz)              Today, you had the following     No orders found for display         Today's Medication Changes          These changes are accurate as of 3/29/18  4:49 PM.  If you have any questions, ask your nurse or doctor.               Start taking these medicines.        Dose/Directions    dronabinol 2.5 MG capsule   Commonly known as:  MARINOL   Used for:  Peritoneal carcinomatosis (H), Anorexia   Started by:  Sukhdeep Mota MD        2.5 mg by mouth bid prn AC for decreased appetite.   Quantity:  60 capsule   Refills:  3       fentaNYL 25 mcg/hr 72 hr patch   Commonly known as:  DURAGESIC   Used for:  Peritoneal carcinomatosis (H), Cancer of sigmoid colon (H)   Started by:  Sukhdeep Mota MD        Dose:  1 patch   Place 1 patch onto the skin every 72 hours remove old patch.   Quantity:  15 patch   Refills:  0            Where to get your medicines      Some of these will need a paper prescription and others can be bought over the counter.  Ask your nurse if you have questions.     Bring a paper prescription for each of these medications     dronabinol 2.5 MG capsule    fentaNYL 25 mcg/hr 72 hr patch                Primary Care Provider Office Phone # Fax #    Jaguar Becker [phone]       [facility] [address] [facility] [address]        Equal Access to Services     [facility] SHAUN AH: Cristina Fukn, wahayderda luqadaha, qaybta kaalmada lewis, bhargav willis. So [facility] [phone].    ATENCIÓN: Si habla español, tiene a cid disposición servicios gratuitos de asistencia lingüística. Llame al [phone].    We comply with applicable " federal civil rights laws and [place] laws. We do not discriminate on the basis of race, color, national origin, age, disability, sex, sexual orientation, or gender identity.            Thank you!     Thank you for choosing [facility]  for your care. Our goal is always to provide you with excellent care. Hearing back from our patients is one way we can continue to improve our services. Please take a few minutes to complete the written survey that you may receive in the mail after your visit with us. Thank you!             Your Updated Medication List - Protect others around you: Learn how to safely use, store and throw away your medicines at www.disposemymeds.org.          This list is accurate as of 3/29/18  4:49 PM.  Always use your most recent med list.                   Brand Name Dispense Instructions for use Diagnosis    amylase-lipase-protease 11733 UNITS Cpep    CREON    240 capsule    Take 2 capsules (72,000 Units) by mouth 3 times daily (with meals) And 1 capsule with each snack tid.    Diarrhea, unspecified type, Peritoneal carcinomatosis (H), Cancer of sigmoid colon (H)       dronabinol 2.5 MG capsule    MARINOL    60 capsule    2.5 mg by mouth bid prn AC for decreased appetite.    Peritoneal carcinomatosis (H), Anorexia       fentaNYL 25 mcg/hr 72 hr patch    DURAGESIC    15 patch    Place 1 patch onto the skin every 72 hours remove old patch.    Peritoneal carcinomatosis (H), Cancer of sigmoid colon (H)       IRON SUPPLEMENT PO      Take 325 mg by mouth 2 times daily (with meals)        LORazepam 0.5 MG tablet    ATIVAN    30 tablet    Take 1 tablet (0.5 mg) by mouth every 4 hours as needed (Anxiety, Nausea/Vomiting or Sleep)    Peritoneal carcinomatosis (H), Cancer of sigmoid colon (H)       MULTIVITAMIN & MINERAL PO      Take 1 tablet by mouth daily        omeprazole 20 MG CR capsule    priLOSEC    30 capsule    Take 1 capsule (20 mg) by mouth daily    Diarrhea, unspecified type        ondansetron 8 MG ODT tab    ZOFRAN-ODT    60 tablet    Take 1 tablet (8 mg) by mouth every 8 hours    Nausea       * oxyCODONE 10 MG 12 hr tablet    OXYCONTIN    120 tablet    Take 2 tablets (20 mg) by mouth every 12 hours    Peritoneal carcinomatosis (H), Cancer of sigmoid colon (H)       * oxyCODONE 5 MG capsule    OXY-IR    100 capsule    Take 1-2 capsules (5-10 mg) by mouth every 4 hours as needed for moderate to severe pain    Peritoneal carcinomatosis (H)       * oxyCODONE IR 10 MG tablet    ROXICODONE    90 tablet    Take 0.5-1 tablets (5-10 mg) by mouth every 4 hours as needed for moderate to severe pain    Cancer of sigmoid colon (H), Peritoneal carcinomatosis (H)       parenteral nutrition - PTA/DISCHARGE ORDER      Inject into the vein daily See medication history note from 2/20/18 for most recent TPN formula.        Psyllium 51.7 % Pack    METAMUCIL FIBER    90 each    Take 1 packet by mouth 3 times daily    Diarrhea, unspecified type       * Notice:  This list has 3 medication(s) that are the same as other medications prescribed for you. Read the directions carefully, and ask your doctor or other care provider to review them with you.

## 2018-03-29 NOTE — LETTER
3/29/2018       RE: Sebas Lopez  [address] [place] [address]     Dear Colleague,    Thank you for referring your patient, Sebas Lopez, to the [facility] at [facility]. Please see a copy of my visit note below.    Palliative Care Outpatient Clinic Progress Note    Patient Name:  Sebas Lopez  Primary Provider:  Jaguar Becker    Chief Complaint: follow up for abdominal pain.     Interim History:  Sebas Lopez 52 year old male with h/o UC c/b sigmoid adenocarcinoma with peritoneal mets s/p small bowel resection with ileostomy with adjuvant chemotherapy who returns to be seen by palliative care today, with girlfriend Bessie.    Recent hospitalization summary:   3/18/18: Developed 2 days of worsening LLQ abdominal pain with decreased ileostomy output with CT demonstrating bowel obstruction with sigmoid obstructing tumor s/p stenting 3/19.    2/20/18: hospitalized with bowel wall thickening concerning for infectious colitis with Vanc/Zosyn and Blood cultures growing MSSE and later cultures from port showed GpB with continued Vanco & port removal.  2/5/18: admitted with generalized weakness, leukocytosis, abdominal pain with CT showing colonic mass with distension.  Colorectal surgery evaluated and recommended cecostomy and GI recommended colonic stent placed 2/6 with improved symptoms.      Since our last visit he was hospitalized 2/5 with obstructing bowel mass s/p stent, rehospitalized 2/20-2/25 with infectious colitis/bacteremia & port removal with discharge on vanco, and again rehospitalized for bowel obstruction s/p repeat colon stent on 3/19/18.    He started back on chemo on Thursday and is due for another course next Thursday.  He has decreased appetite but denied neuropathy.      He notes dysgusia with decreased appetite.  Smells things and will get occasionally queezy and nauseated with vomiting recently.  In the past  couple of days he has eaten about 2-3 meals per day (~1962-2964 calories) and had been taking about 1000 calories early this week.      Since colon stent placement he has been having at least 2 ostomy empties per day.  His octreotide, opium tincture, and loperamide have been on hold since the colonic stent in 2/2018 and he has noted only mild decrease in output after starting chemo (but was associated with decreased PO intake).     He will occasionally note distension of his abdomen that recently has been progressive requiring restenting of his colon.  He was told they could try to empty out his colon if he was noting back up.      His pain has been less since his most recent hospital stay.  He has continued to take oxycontin 20 mg bid and oxycodone 10 mg prn (taking 3-4 times daily).  Pain primarily over lower abdomen with occasional radiation to LLQ into his L lower back.  Pain is often slowly building with associated lethargy that will help jasson with oxycodone after about an hour.      His sleep has been good and sleeping 6-8 hours overnight in stretches of 3-4 hours.  Occasionally wakes up with pain.  He also has TPN running at night at well that wakes him as well.  He feels rested in the morning.  He feels pain is more prominent at night with him taking a dose before bedtime and when he wakes up.     Sebas was previously interested in knowing his prognosis, but he has changed his opinion of this.  He does not want to know his prognosis at this time.      Social History:  Key support resources: Girlfriend Bessie and family.  Advance Directive Status:  HCD in process of being completed  Social History   Substance Use Topics     Smoking status: Never Smoker     Smokeless tobacco: Never Used     Alcohol use 3.0 oz/week     5 Cans of beer per week      Comment: none for last 4 months     Review of Systems: ROS: 10 point ROS neg other than the symptoms noted above in the HPI.          Physical Exam:   Vitals were  "reviewed  /81 (BP Location: Left arm, Patient Position: Chair, Cuff Size: Adult Regular)  Pulse 110  Temp 98.2  F (36.8  C) (Oral)  Resp 16  Ht 1.753 m (5' 9.02\")  Wt 65 kg (143 lb 3.2 oz)  SpO2 98%  BMI 21.14 kg/m2  General: Alert, comfortable, thin appearing male in no acute distress.   Eyes: Pupils equal, sclera clear.   ENT: MMM. No ulcerations or plaques.   Cardiac: Normal rate, regular rhythm, no murmurs.    Resp: CTAB, unlabored on room air without w/r/r.   Abd: Soft, non-distended, non-tender to palpation, ileostomy in place over RLQ, normoactive bowel sounds.   Ext: Warm and well perfused.  No pedal edema.   Neuro: No facial asymmetry.  Spontaneous movements grossly non-focal.   Neuropsych: Alert, appropriately interactive, full affect.    Impression & Recommendations & Counseling:  Peritoneal Carcinomatosis  Abdominal pain  Patient with two recent hospitalization for bowel obstruction s/p colonic stent x2 with noted improvement in bowel transit but with repeat episodes of worsened pain with passage of long acting opiate in his stool with fast transit bowel.  At this time we discussed transition to fentanyl patch to bypass the GI tract and detailed close monitoring of his pain with this patch for the first few days with continued oxycodone for short acting pain control.    Anorexia  Patient having anorexia likely 2/2 his chemotherapy with a qow regimen.  At this point he is eating ~8270-4737 calories daily, but given his plan for repeat chemo next Thursday will write for prn dronabinol 2.5 mg bid to help with appetite.  Also discussed megace, but will hold for now given DVT risk factor.      RTC 6 weeks for a follow up.     Additional information reviewed today:   Allergies   Allergen Reactions     Liquid Adhesive Rash     Dermabond, rash with pustules, occurred with abdominal surgery and port removal      Current Outpatient Prescriptions   Medication Sig Dispense Refill     oxyCODONE IR " (ROXICODONE) 10 MG tablet Take 0.5-1 tablets (5-10 mg) by mouth every 4 hours as needed for moderate to severe pain 90 tablet 0     ondansetron (ZOFRAN-ODT) 8 MG ODT tab Take 1 tablet (8 mg) by mouth every 8 hours 60 tablet 0     Multiple Vitamins-Minerals (MULTIVITAMIN & MINERAL PO) Take 1 tablet by mouth daily       oxyCODONE (OXY-IR) 5 MG capsule Take 1-2 capsules (5-10 mg) by mouth every 4 hours as needed for moderate to severe pain 100 capsule 0     parenteral nutrition - PTA/DISCHARGE ORDER Inject into the vein daily See medication history note from 2/20/18 for most recent TPN formula.       oxyCODONE (OXYCONTIN) 10 MG 12 hr tablet Take 2 tablets (20 mg) by mouth every 12 hours 120 tablet 0     omeprazole (PRILOSEC) 20 MG CR capsule Take 1 capsule (20 mg) by mouth daily 30 capsule 11     amylase-lipase-protease (CREON) 28231 UNITS CPEP Take 2 capsules (72,000 Units) by mouth 3 times daily (with meals) And 1 capsule with each snack tid. 240 capsule 3     LORazepam (ATIVAN) 0.5 MG tablet Take 1 tablet (0.5 mg) by mouth every 4 hours as needed (Anxiety, Nausea/Vomiting or Sleep) 30 tablet 5     Psyllium (METAMUCIL FIBER) 51.7 % PACK Take 1 packet by mouth 3 times daily 90 each 3     Ferrous Sulfate (IRON SUPPLEMENT PO) Take 325 mg by mouth 2 times daily (with meals)        Past Medical History:   Diagnosis Date     Adenocarcinoma of sigmoid colon (H)     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     Ulcerative colitis (H)      Past Surgical History:   Procedure Laterality Date     COLONOSCOPY  2017     COLONOSCOPY N/A 11/30/2017    Procedure: COLONOSCOPY;  Colonoscopy;  Surgeon: Rob Dudley MD;  Location:  GI     COLONOSCOPY N/A 2/6/2018    Procedure: COMBINED COLONOSCOPY, STENT PLACEMENT;  COMBINED  COLONOSCOPY with Colonic Stent Placement;  Surgeon: Guru Lionel Mar MD;  Location: [facility] OR     COLONOSCOPY N/A 3/19/2018    Procedure: COMBINED COLONOSCOPY, STENT PLACEMENT;  flexible sigmoidoscopy with stent placement and dilation;  Surgeon: Alexis Rios MD;  Location: [facility] OR     GI SURGERY  07/10/2017    Extensive lysis of adhesions, small bowel resection with end ileostomy.      HERNIA REPAIR       INSERT PORT VASCULAR ACCESS Right 8/10/2017    Procedure: INSERT PORT VASCULAR ACCESS;  Single Lumen Right Chest Power Port;  Surgeon: Malena Andrew PA-C;  [facility]     LAPAROSCOPY DIAGNOSTIC (GENERAL) N/A 12/6/2017    Procedure: LAPAROSCOPY DIAGNOSTIC (GENERAL);  Diagnostic Laparoscopy, Exploratory Laparotomy Anesthesia Block ;  Surgeon: Rob Dudley MD;  Location: [facility] OR     LAPAROTOMY EXPLORATORY N/A 12/6/2017    Procedure: LAPAROTOMY EXPLORATORY;;  Surgeon: Rob Dudley MD;  Location: [facility] OR     ORTHOPEDIC SURGERY Left     HIP ARTHROPLASTY     PICC INSERTION Right 02/23/2018    5Fr DL BioFlo PICC, 44cm (3cm external) in the R basilic vein w/ tip in the SVC RA junction     Key Data Reviewed:  LABS:   Lab Results   Component Value Date    WBC 9.7 03/22/2018    HGB 9.6 (L) 03/22/2018    HCT 30.3 (L) 03/22/2018     03/22/2018     03/22/2018    POTASSIUM 4.4 03/22/2018    CHLORIDE 101 03/22/2018    CO2 25 03/22/2018    BUN 28 03/22/2018    CR 0.86 03/22/2018    GLC 96 03/22/2018    SED 82 03/06/2018    AST 22 03/22/2018    ALT 31 03/22/2018    ALKPHOS 155 (H) 03/22/2018    BILITOTAL 0.2 03/22/2018    INR 1.12 03/18/2018     IMAGING:   CT Abd/Pelvis (3/18/18):  1. Pan colonic distention concerning for distal large bowel  obstruction. Transition point likely related to sigmoid stent which is  more narrowed on this exam and appears less contiguous with the  rectum.   2. Stable peritoneal thickening and nodularity with small volume  abdominal free  fluid likely related to peritoneal carcinomatosis.  3. Stable prominent mesenteric and retroperitoneal lymph nodes which  may be metastatic or reactive.    [place]  - Reviewed    Thank you for continuing to involve me in the care of this patient.       Attending Physician Attestation    Patient seen & evaluated with Dr Mota. I agree with and confirm jenkins findings in his note.    Tarah Rome MD / Palliative Medicine / Pager [phone] / After-Hours Answering Service [phone] / Main Palliative Clinic - [facility] [phone] / [facility] Inpatient Team Consult Pager [phone] (answered 8am-430pm M-F)      Again, thank you for allowing me to participate in the care of your patient.      Sincerely,    Sukhdeep Mota MD

## 2018-03-30 NOTE — PROGRESS NOTES
This is a recent snapshot of the patient's [facility] medical record.  For current drug dose and complete information and questions, call [phone] or In Basket pool, [facility] ([id number])  CSN Number:  [id number]

## 2018-04-03 NOTE — PROGRESS NOTES
Reviewed labs from 3/27/18 in Care Everywhere. Labs are stable without neutropenia and does not meet parameters for transfusion.

## 2018-04-03 NOTE — TELEPHONE ENCOUNTER
Received VM from patient asking for call back in regarding to pain medication. Called him back to discuss. He feels like his fentanyl dose is too low - having a lot of pain. He states that he is having to take oxycodone 15mg every 4 hours, including overnight. He acknowledges he should not have increased that dose on his own, but feels he didn't know what to do over the holiday weekend with pain. Discussed med safety and that he needs to call if pain uncontrolled so we can advise on dose changes. He verbalized agreement and understanding. Patch change due tomorrow. Denies side effects, just still having a lot of pain. Feels like fentanyl is too low    --    Spoke with Dr. Rome - okay to try 2 patches now. Remove current patch and replace with 2 new patches. Called and advised patient of this, he repeated my instructions back to me and verbalized understanding. He tells me he will go back to a max of 10mg oxycodone at a time. Will check in with patient later this week.

## 2018-04-04 NOTE — TELEPHONE ENCOUNTER
[facility] is managing pt's TPN. They report he is a bit dry, has increased ostomy output, recommending a liter of fluids daily prn for now.  Verbal OK given. Will update care team.

## 2018-04-05 NOTE — NURSING NOTE
Chief Complaint   Patient presents with     Blood Draw     Labs drawn from PICC by RN. Line flushed with saline. Vs taken and pt checked in for appt     Labs collected from PICC.  Line flushed with saline.  Vitals taken and pt checked in for appt.    Cayla Martell RN

## 2018-04-05 NOTE — PROGRESS NOTES
This is a recent snapshot of the patient's [facility] medical record.  For current drug dose and complete information and questions, call [phone] or In Basket pool, fv home infusion ([id number])  CSN Number:  [id number]

## 2018-04-05 NOTE — MR AVS SNAPSHOT
After Visit Summary   4/5/2018    Sebas Lopez    MRN: [id number]           Patient Information     Date Of Birth          1965        Visit Information        Provider Department      4/5/2018 12:30 PM UC 26 ATC;  ONCOLOGY INFUSION [facility]        Today's Diagnoses     Peritoneal carcinomatosis (H)    -  1    Cancer of sigmoid colon (H)          Care Instructions    Contact Numbers    [facility] Main Line: [phone]  [facility] Triage:  [phone]    Call triage with chills and/or temperature greater than or equal to 100.5, uncontrolled nausea/vomiting, diarrhea, constipation, dizziness, shortness of breath, chest pain, bleeding, unexplained bruising, or any new/concerning symptoms, questions/concerns.     If you are having any concerning symptoms or wish to speak to a provider before your next infusion visit, please call your care coordinator or triage to notify them so we can adequately serve you.       After Hours: [phone]    If after hours, weekends, or holidays, call main hospital  and ask for Oncology doctor on call.           April 2018 Sunday Monday Tuesday Wednesday Thursday Friday Saturday   1     2     3     4     5     [facility] LAB DRAW   10:30 AM   (15 min.)    [facility] LAB DRAW   [facility] Lab Draw     UMP RETURN   10:55 AM   (50 min.)   Linda Alves PA-C   [facility] ONC INFUSION 240   12:30 PM   (240 min.)    ONCOLOGY INFUSION   [facility] 6     7       8     9     10     11     12     13     14       15     16     17     18     19     P [facility] LAB DRAW   10:15 AM   (15 min.)    [facility] LAB DRAW   [facility] Lab Draw     UMP RETURN   10:55 AM   (50 min.)   Linda Alves PA-C   [facility] ONC INFUSION 240   12:30 PM   (240 min.)    ONCOLOGY INFUSION   [facility] 20     21       22     23     24     25     26      27     28       29     30                                         May 2018   [place] Monday Tuesday Wednesday Thursday Friday Saturday             1     2     3     [facility] LAB DRAW   10:15 AM   (15 min.)    M[facility]ONIC LAB DRAW   [facility] Lab Draw     CT CHEST/ABDOMEN/PELVIS W   10:45 AM   (20 min.)   UCCT1   [facility] CT     UMP RETURN    1:15 PM   (30 min.)   Albert Long MD   [facility] [place] [facility] 4     UMP ONC INFUSION 240   10:00 AM   (240 min.)   UC ONCOLOGY INFUSION   [facility] 5       6     7     8     9     10     11     12       13     14     15     16     17     UMP RETURN    2:00 PM   (45 min.)   Sukhdeep Mota MD   [facility] 18     19       20     21     22     23     24     25     26       27     28     29     30     31                           Recent Results (from the past 24 hour(s))   Magnesium    Collection Time: 04/05/18 10:52 AM   Result Value Ref Range    Magnesium 2.4 (H) 1.6 - 2.3 mg/dL   Phosphorus    Collection Time: 04/05/18 10:52 AM   Result Value Ref Range    Phosphorus 4.2 2.5 - 4.5 mg/dL   CBC with platelets differential    Collection Time: 04/05/18 10:52 AM   Result Value Ref Range    WBC 9.9 4.0 - 11.0 10e9/L    RBC Count 4.21 (L) 4.4 - 5.9 10e12/L    Hemoglobin 11.7 (L) 13.3 - 17.7 g/dL    Hematocrit 36.2 (L) 40.0 - 53.0 %    MCV 86 78 - 100 fl    MCH 27.8 26.5 - 33.0 pg    MCHC 32.3 31.5 - 36.5 g/dL    RDW 15.9 (H) 10.0 - 15.0 %    Platelet Count 330 150 - 450 10e9/L    Diff Method Automated Method     % Neutrophils 62.9 %    % Lymphocytes 18.5 %    % Monocytes 13.4 %    % Eosinophils 4.0 %    % Basophils 0.7 %    % Immature Granulocytes 0.5 %    Nucleated RBCs 0 0 /100    Absolute Neutrophil 6.2 1.6 - 8.3 10e9/L    Absolute Lymphocytes 1.8 0.8 - 5.3 10e9/L    Absolute Monocytes 1.3 0.0 - 1.3 10e9/L    Absolute Eosinophils 0.4 0.0 - 0.7 10e9/L    Absolute Basophils 0.1 0.0 - 0.2 10e9/L    Abs Immature  Granulocytes 0.1 0 - 0.4 10e9/L    Absolute Nucleated RBC 0.0    Comprehensive metabolic panel    Collection Time: 04/05/18 10:52 AM   Result Value Ref Range    Sodium 129 (L) 133 - 144 mmol/L    Potassium 4.9 3.4 - 5.3 mmol/L    Chloride 98 94 - 109 mmol/L    Carbon Dioxide 24 20 - 32 mmol/L    Anion Gap 8 3 - 14 mmol/L    Glucose 105 (H) 70 - 99 mg/dL    Urea Nitrogen 39 (H) 7 - 30 mg/dL    Creatinine 1.01 0.66 - 1.25 mg/dL    GFR Estimate 77 >60 mL/min/1.7m2    GFR Estimate If Black >90 >60 mL/min/1.7m2    Calcium 9.3 8.5 - 10.1 mg/dL    Bilirubin Total 0.3 0.2 - 1.3 mg/dL    Albumin 3.5 3.4 - 5.0 g/dL    Protein Total 8.9 (H) 6.8 - 8.8 g/dL    Alkaline Phosphatase 248 (H) 40 - 150 U/L    ALT 74 (H) 0 - 70 U/L    AST 46 (H) 0 - 45 U/L                 Follow-ups after your visit        Your next 10 appointments already scheduled     Apr 19, 2018 10:15 AM CDT   [facility] Lab Draw with  [facility] LAB DRAW   [facility])    [address] [place] [address]  [address]  [facility] [address]   [phone]            Apr 19, 2018 11:10 AM CDT   (Arrive by 10:55 AM)   Return Visit with Linda Alves PA-C   [facility])    [address] [place] [address]  [address]  [facility] [address]   [phone]            Apr 19, 2018 12:30 PM CDT   Infusion 240 with UC ONCOLOGY INFUSION, UC 30 ATC   [facility] ([facility])    [address] [place] [address]  [address]  [facility] [address]   [phone]            May 03, 2018 10:15 AM CDT   [facility] Lab Draw with UC MASONIC LAB DRAW   [facility] Masonic Lab Draw ([facility])    [address] [place] [address]  [address]  [facility] [address]   [phone]            May 03, 2018 11:00 AM CDT   (Arrive by 10:45 AM)   CT CHEST/ABDOMEN/PELVIS W CONTRAST with UCCT1   [facility] CT ([facility])     [address]  1st Floor  [facility] [address]   [phone]           Please bring any scans or X-rays taken at other hospitals, if similar tests were done. Also bring a list of your medicines, including vitamins, minerals and over-the-counter drugs. It is safest to leave personal items at home.  Be sure to tell your doctor:   If you have any allergies.   If there s any chance you are pregnant.   If you are breastfeeding.  You may have contrast for this exam. To prepare:   Do not eat or drink for 2 hours before your exam. If you need to take medicine, you may take it with small sips of water. (We may ask you to take liquid medicine as well.)   The day before your exam, drink extra fluids at least six 8-ounce glasses (unless your doctor tells you to restrict your fluids).   You will be given instructions on how to drink the contrast.  Patients over 70 or patients with diabetes or kidney problems:   If you haven t had a blood test (creatinine test) within the last 30 days, the Cardiologist/Radiologist may require you to get this test prior to your exam.  If you have diabetes:   Continue to take your metformin medication on the day of your exam  Please wear loose clothing, such as a sweat suit or jogging clothes. Avoid snaps, zippers and other metal. We may ask you to undress and put on a hospital gown.  If you have any questions, please call the Imaging Department where you will have your exam.            May 03, 2018  1:30 PM CDT   (Arrive by 1:15 PM)   Return Visit with Albert Long MD   [facility])    [address] [place] [address]  [address]  [facility] [address]   [phone]            May 04, 2018 10:00 AM CDT   Infusion 240 with UC ONCOLOGY INFUSION, UC 17 ATC   [facility])    [address] [place] [address]  [address]  [facility] [address]   [phone]            May 17, 2018  2:15 PM  CDT   (Arrive by 2:00 PM)   Return Visit with Sukhdeep Mota MD   [facility] ([email or web address] and [facility] [place])    [address]  [address]  [facility] [address] [phone]              Who to contact     If you have questions or need follow up information about today's clinic visit or your schedule please contact [facility] directly at [phone].  Normal or non-critical lab and imaging results will be communicated to you by Fabkidshart, letter or phone within 4 business days after the clinic has received the results. If you do not hear from us within 7 days, please contact the clinic through [employer] or phone. If you have a critical or abnormal lab result, we will notify you by phone as soon as possible.  Submit refill requests through uBank or call your pharmacy and they will forward the refill request to us. Please allow 3 business days for your refill to be completed.          Additional Information About Your Visit        FabkidsharblueKiwi Information     [employer]Bank gives you secure access to your electronic health record. If you see a primary care provider, you can also send messages to your care team and make appointments. If you have questions, please call your primary care clinic.  If you do not have a primary care provider, please call [phone] and they will assist you.        Care EveryWhere ID     This is your Care EveryWhere ID. This could be used by other organizations to access your [facility] medical records  [id number]         Blood Pressure from Last 3 Encounters:   04/05/18 110/81   03/29/18 120/81   03/22/18 122/84    Weight from Last 3 Encounters:   04/05/18 65.3 kg (144 lb)   03/29/18 65 kg (143 lb 3.2 oz)   03/22/18 67.6 kg (149 lb)              We Performed the Following     CBC with platelets differential     Comprehensive metabolic panel     Magnesium     Phosphorus          Today's Medication Changes          These changes are accurate as  of 4/5/18  2:49 PM.  If you have any questions, ask your nurse or doctor.               These medicines have changed or have updated prescriptions.        Dose/Directions    * fentaNYL 12 mcg/hr 72 hr patch   Commonly known as:  DURAGESIC   This may have changed:  Another medication with the same name was added. Make sure you understand how and when to take each.   Used for:  Cancer of sigmoid colon (H), Peritoneal carcinomatosis (H)   Changed by:  Linda Alves PA-C        Dose:  1 patch   Place 1 patch onto the skin every 72 hours remove old patch.   Quantity:  10 patch   Refills:  0       * fentaNYL 25 mcg/hr 72 hr patch   Commonly known as:  DURAGESIC   This may have changed:  You were already taking a medication with the same name, and this prescription was added. Make sure you understand how and when to take each.   Used for:  Cancer of sigmoid colon (H)   Changed by:  JoselynLinda PA-C        Dose:  1 patch   Place 1 patch onto the skin every 72 hours remove old patch.   Quantity:  10 patch   Refills:  0       * Notice:  This list has 2 medication(s) that are the same as other medications prescribed for you. Read the directions carefully, and ask your doctor or other care provider to review them with you.         Where to get your medicines      Some of these will need a paper prescription and others can be bought over the counter.  Ask your nurse if you have questions.     Bring a paper prescription for each of these medications     fentaNYL 25 mcg/hr 72 hr patch                Primary Care Provider Office Phone # Fax #    Jaguar Becker [phone]       [address] [address]        Equal Access to Services     [facility] AH: Hadii karrie lazar Soshu, waaxda luqadaha, qaybta kaalmada bhargav saucedo. So [facility] [phone].    ATENCIÓN: Si habla español, tiene a cid disposición servicios gratuitos de  asistencia lingüística. Kenan al [phone].    We comply with applicable federal civil rights laws and [place] laws. We do not discriminate on the basis of race, color, national origin, age, disability, sex, sexual orientation, or gender identity.            Thank you!     Thank you for choosing [facility]  for your care. Our goal is always to provide you with excellent care. Hearing back from our patients is one way we can continue to improve our services. Please take a few minutes to complete the written survey that you may receive in the mail after your visit with us. Thank you!             Your Updated Medication List - Protect others around you: Learn how to safely use, store and throw away your medicines at www.disposemymeds.org.          This list is accurate as of 4/5/18  2:49 PM.  Always use your most recent med list.                   Brand Name Dispense Instructions for use Diagnosis    amylase-lipase-protease 30436 UNITS Cpep    CREON    240 capsule    Take 2 capsules (72,000 Units) by mouth 3 times daily (with meals) And 1 capsule with each snack tid.    Diarrhea, unspecified type, Peritoneal carcinomatosis (H), Cancer of sigmoid colon (H)       dronabinol 2.5 MG capsule    MARINOL    60 capsule    2.5 mg by mouth bid prn AC for decreased appetite.    Peritoneal carcinomatosis (H), Anorexia       * fentaNYL 12 mcg/hr 72 hr patch    DURAGESIC    10 patch    Place 1 patch onto the skin every 72 hours remove old patch.    Cancer of sigmoid colon (H), Peritoneal carcinomatosis (H)       * fentaNYL 25 mcg/hr 72 hr patch    DURAGESIC    10 patch    Place 1 patch onto the skin every 72 hours remove old patch.    Cancer of sigmoid colon (H)       IRON SUPPLEMENT PO      Take 325 mg by mouth 2 times daily (with meals)        LORazepam 0.5 MG tablet    ATIVAN    30 tablet    Take 1 tablet (0.5 mg) by mouth every 4 hours as needed (Anxiety, Nausea/Vomiting or Sleep)    Peritoneal  carcinomatosis (H), Cancer of sigmoid colon (H)       MULTIVITAMIN & MINERAL PO      Take 1 tablet by mouth daily        naloxone nasal spray    NARCAN    0.2 mL    Spray 1 spray (4 mg) into one nostril alternating nostrils as needed for opioid reversal every 2-3 minutes until assistance arrives    Cancer of sigmoid colon (H), Long term current use of opiate analgesic       omeprazole 20 MG CR capsule    priLOSEC    30 capsule    Take 1 capsule (20 mg) by mouth daily    Diarrhea, unspecified type       ondansetron 8 MG ODT tab    ZOFRAN-ODT    60 tablet    Take 1 tablet (8 mg) by mouth every 8 hours    Nausea       * oxyCODONE 10 MG 12 hr tablet    OXYCONTIN    120 tablet    Take 2 tablets (20 mg) by mouth every 12 hours    Peritoneal carcinomatosis (H), Cancer of sigmoid colon (H)       * oxyCODONE 5 MG capsule    OXY-IR    100 capsule    Take 1-2 capsules (5-10 mg) by mouth every 4 hours as needed for moderate to severe pain    Peritoneal carcinomatosis (H)       * oxyCODONE IR 10 MG tablet    ROXICODONE    90 tablet    Take 0.5-1 tablets (5-10 mg) by mouth every 4 hours as needed for moderate to severe pain    Cancer of sigmoid colon (H), Peritoneal carcinomatosis (H)       parenteral nutrition - PTA/DISCHARGE ORDER      Inject into the vein daily See medication history note from 2/20/18 for most recent TPN formula.        Psyllium 51.7 % Pack    METAMUCIL FIBER    90 each    Take 1 packet by mouth 3 times daily    Diarrhea, unspecified type       * Notice:  This list has 5 medication(s) that are the same as other medications prescribed for you. Read the directions carefully, and ask your doctor or other care provider to review them with you.

## 2018-04-05 NOTE — PATIENT INSTRUCTIONS
Contact Numbers    [facility] Main Line: [phone]  [facility] Triage:  [phone]    Call triage with chills and/or temperature greater than or equal to 100.5, uncontrolled nausea/vomiting, diarrhea, constipation, dizziness, shortness of breath, chest pain, bleeding, unexplained bruising, or any new/concerning symptoms, questions/concerns.     If you are having any concerning symptoms or wish to speak to a provider before your next infusion visit, please call your care coordinator or triage to notify them so we can adequately serve you.       After Hours: [phone]    If after hours, weekends, or holidays, call main hospital  and ask for Oncology doctor on call.           April 2018 Sunday Monday Tuesday Wednesday Thursday Friday Saturday   1     2     3     4     5     [facility] LAB DRAW   10:30 AM   (15 min.)    [facility]   [facility]     [facility] RETURN   10:55 AM   (50 min.)   Linda Alves PA-C   [facility]   12:30 PM   (240 min.)    ONCOLOGY INFUSION   [facility] 6     7       8     9     10     11     12     13     14       15     16     17     18     19     [facility] LAB DRAW   10:15 AM   (15 min.)    [facility] LAB DRAW   [facility]     UMP RETURN   10:55 AM   (50 min.)   Linda Alves PA-C   [facility] 240   12:30 PM   (240 min.)    ONCOLOGY INFUSION   [facility] 20     21       22     23     24     25     26     27     28       29     30                                         May 2018   [place] Monday Tuesday Wednesday Thursday Friday Saturday             1     2     3     [facility] LAB DRAW   10:15 AM   (15 min.)   [facility] DRAW   [facility]     CT CHEST/ABDOMEN/PELVIS W   10:45 AM   (20 min.)   UCCT1   [facility] [place] [facility]     UMP RETURN    1:15 PM   (30 min.)   Albert Long MD   [facility]  [facility] 4     UMP ONC INFUSION 240   10:00 AM   (240 min.)   UC ONCOLOGY INFUSION   [facility] 5       6     7     8     9     10     11     12       13     14     15     16     17     UMP RETURN    2:00 PM   (45 min.)   Sukhdeep Mota MD   [facility] 18     19       20     21     22     23     24     25     26       27     28     29     30     31                           Recent Results (from the past 24 hour(s))   Magnesium    Collection Time: 04/05/18 10:52 AM   Result Value Ref Range    Magnesium 2.4 (H) 1.6 - 2.3 mg/dL   Phosphorus    Collection Time: 04/05/18 10:52 AM   Result Value Ref Range    Phosphorus 4.2 2.5 - 4.5 mg/dL   CBC with platelets differential    Collection Time: 04/05/18 10:52 AM   Result Value Ref Range    WBC 9.9 4.0 - 11.0 10e9/L    RBC Count 4.21 (L) 4.4 - 5.9 10e12/L    Hemoglobin 11.7 (L) 13.3 - 17.7 g/dL    Hematocrit 36.2 (L) 40.0 - 53.0 %    MCV 86 78 - 100 fl    MCH 27.8 26.5 - 33.0 pg    MCHC 32.3 31.5 - 36.5 g/dL    RDW 15.9 (H) 10.0 - 15.0 %    Platelet Count 330 150 - 450 10e9/L    Diff Method Automated Method     % Neutrophils 62.9 %    % Lymphocytes 18.5 %    % Monocytes 13.4 %    % Eosinophils 4.0 %    % Basophils 0.7 %    % Immature Granulocytes 0.5 %    Nucleated RBCs 0 0 /100    Absolute Neutrophil 6.2 1.6 - 8.3 10e9/L    Absolute Lymphocytes 1.8 0.8 - 5.3 10e9/L    Absolute Monocytes 1.3 0.0 - 1.3 10e9/L    Absolute Eosinophils 0.4 0.0 - 0.7 10e9/L    Absolute Basophils 0.1 0.0 - 0.2 10e9/L    Abs Immature Granulocytes 0.1 0 - 0.4 10e9/L    Absolute Nucleated RBC 0.0    Comprehensive metabolic panel    Collection Time: 04/05/18 10:52 AM   Result Value Ref Range    Sodium 129 (L) 133 - 144 mmol/L    Potassium 4.9 3.4 - 5.3 mmol/L    Chloride 98 94 - 109 mmol/L    Carbon Dioxide 24 20 - 32 mmol/L    Anion Gap 8 3 - 14 mmol/L    Glucose 105 (H) 70 - 99 mg/dL    Urea Nitrogen 39 (H) 7 - 30 mg/dL    Creatinine 1.01 0.66 -  1.25 mg/dL    GFR Estimate 77 >60 mL/min/1.7m2    GFR Estimate If Black >90 >60 mL/min/1.7m2    Calcium 9.3 8.5 - 10.1 mg/dL    Bilirubin Total 0.3 0.2 - 1.3 mg/dL    Albumin 3.5 3.4 - 5.0 g/dL    Protein Total 8.9 (H) 6.8 - 8.8 g/dL    Alkaline Phosphatase 248 (H) 40 - 150 U/L    ALT 74 (H) 0 - 70 U/L    AST 46 (H) 0 - 45 U/L

## 2018-04-05 NOTE — PROGRESS NOTES
Infusion Nursing Note:  Sebas Lopez presents today for Cycle 9 day 1 oxaliplatin, leucovorin, fluorouracil bolus, fluorouracil pump connect.    Patient seen by provider today: Yes: Linda DE LUNA   present during visit today: Not Applicable.    Note: Pt will do home IVF, so no interventions for Na 129. Pt will NOT be getting sandostatin today per Linda DE LUNA.    Intravenous Access:  PICC double lumen, purple lumen used    Treatment Conditions:  Lab Results   Component Value Date    HGB 11.7 04/05/2018     Lab Results   Component Value Date    WBC 9.9 04/05/2018      Lab Results   Component Value Date    ANEU 6.2 04/05/2018     Lab Results   Component Value Date     04/05/2018      Lab Results   Component Value Date     04/05/2018                   Lab Results   Component Value Date    POTASSIUM 4.9 04/05/2018           Lab Results   Component Value Date    MAG 2.4 04/05/2018            Lab Results   Component Value Date    CR 1.01 04/05/2018                   Lab Results   Component Value Date    ANNAMARIE 9.3 04/05/2018                Lab Results   Component Value Date    BILITOTAL 0.3 04/05/2018           Lab Results   Component Value Date    ALBUMIN 3.5 04/05/2018                    Lab Results   Component Value Date    ALT 74 04/05/2018           Lab Results   Component Value Date    AST 46 04/05/2018     Results reviewed, labs MET treatment parameters, ok to proceed with treatment.    Post Infusion Assessment:  Patient tolerated infusion without incident.  Blood return noted pre and post infusion.  Site patent and intact, free from redness, edema or discomfort.  No evidence of extravasations.  Access intact with pump infusing    Prior to discharge: Port is secured in place with tegaderm and flushed with 10cc NS with positive blood return noted.  Continuous home infusion Dosi-Fuser pump connected.    All connectors secured in place and clamps taped open, confirmed by Sydnee Godwin  "RN.    Pump started, \"running\" noted on display (CADD): Not Applicable.  Patient instructed to call our clinic or [facility] with any questions or concerns at home.  Patient verbalized understanding.    Patient set up for pump disconnect at home with [facility] on 4/7/18 at 1230, confirmed with Tara DUGGAN.      Discharge Plan:   Patient declined prescription refills.  Discharge instructions reviewed with: Patient and Family.  Patient and/or family verbalized understanding of discharge instructions and all questions answered.  Copy of AVS reviewed with patient and/or family.  Patient will return 4/19/18 for next appointment.  Patient discharged in stable condition accompanied by: mother.  Departure Mode: Ambulatory.    FRANCES VALENCIA RN                        "

## 2018-04-05 NOTE — NURSING NOTE
"Oncology Rooming Note    April 5, 2018 11:14 AM   Sebas Lopez is a 52 year old male who presents for:    Chief Complaint   Patient presents with     Blood Draw     Labs drawn from PICC by RN. Line flushed with saline. Vs taken and pt checked in for appt     Oncology Clinic Visit     Return for Colon Ca , Labs, Tx     Initial Vitals: /81 (BP Location: Left arm, Cuff Size: Adult Regular)  Pulse 95  Temp 98.3  F (36.8  C) (Oral)  Resp 16  Wt 65.3 kg (144 lb)  SpO2 99%  BMI 21.26 kg/m2 Estimated body mass index is 21.26 kg/(m^2) as calculated from the following:    Height as of 3/29/18: 1.753 m (5' 9.02\").    Weight as of this encounter: 65.3 kg (144 lb). Body surface area is 1.78 meters squared.  No Pain (1) Comment: Data Unavailable   No LMP for male patient.  Allergies reviewed: Yes  Medications reviewed: Yes    Medications: MEDICATION REFILLS NEEDED TODAY. Provider was notified.  Pharmacy name entered into [facility]: [facility] - [place] - [place], [place] - [address] [place] [address]    Clinical concerns: refills might be needed  Joselyn  was notified.    7  minutes for nursing intake (face to face time)     Meche Larry MA              "

## 2018-04-05 NOTE — MR AVS SNAPSHOT
After Visit Summary   4/5/2018    Sebas Lopez    MRN: [id number]           Patient Information     Date Of Birth          1965        Visit Information        Provider Department      4/5/2018 4:01 PM Venita Maldonado MSW [facility]        Today's Diagnoses     Visit for counseling    -  1       Follow-ups after your visit        Your next 10 appointments already scheduled     Apr 19, 2018 10:15 AM CDT   [facility] Lab Draw with [facility] LAB DRAW   [facility])    [address]  [address]  [facility] [address]   [phone]            Apr 19, 2018 11:10 AM CDT   (Arrive by 10:55 AM)   Return Visit with Linda Alves PA-C   [facility] ([facility])    [address] [place] [address]  [address]  [facility] [address]   [phone]            Apr 19, 2018 12:30 PM CDT   Infusion 240 with UC ONCOLOGY INFUSION, UC 30 ATC   [facility] ([facility])    [address] [place] [address]  [address]  [facility] [address]   [phone]            May 03, 2018 10:15 AM CDT   [facility] Lab Draw with [facility] LAB DRAW   [facility]    [address]  [address]  [facility] [address]   [phone]            May 03, 2018 11:00 AM CDT   (Arrive by 10:45 AM)   CT CHEST/ABDOMEN/PELVIS W CONTRAST with UCCT1   [facility] [place] [facility] ([facility])    [address] [place] [address]  1st Floor  [facility] [address]   [phone]           Please bring any scans or X-rays taken at other hospitals, if similar tests were done. Also bring a list of your medicines, including vitamins, minerals and over-the-counter drugs. It is safest to leave personal items at home.  Be sure to tell your doctor:   If you have any allergies.   If there s any chance you are pregnant.   If  you are breastfeeding.  You may have contrast for this exam. To prepare:   Do not eat or drink for 2 hours before your exam. If you need to take medicine, you may take it with small sips of water. (We may ask you to take liquid medicine as well.)   The day before your exam, drink extra fluids at least six 8-ounce glasses (unless your doctor tells you to restrict your fluids).   You will be given instructions on how to drink the contrast.  Patients over 70 or patients with diabetes or kidney problems:   If you haven t had a blood test (creatinine test) within the last 30 days, the Cardiologist/Radiologist may require you to get this test prior to your exam.  If you have diabetes:   Continue to take your metformin medication on the day of your exam  Please wear loose clothing, such as a sweat suit or jogging clothes. Avoid snaps, zippers and other metal. We may ask you to undress and put on a hospital gown.  If you have any questions, please call the Imaging Department where you will have your exam.            May 03, 2018  1:30 PM CDT   (Arrive by 1:15 PM)   Return Visit with Albert Long MD   [facility] ([facility])    [address] [place] [address]  [address]  [facility] [address] [phone]            May 04, 2018 10:00 AM CDT   Infusion 240 with UC ONCOLOGY INFUSION, UC 17 ATC   [facility] ([facility])    [address]  [address]  [facility] [address] [phone]            May 17, 2018  2:15 PM CDT   (Arrive by 2:00 PM)   Return Visit with Sukhdeep Mota MD   [facility] ([facility])    [address] [place] [address]  [address]  [facility] [address] [phone]              Who to contact     If you have questions or need follow up information about today's clinic visit or your schedule please contact [facility] directly at [phone].  Normal or  non-critical lab and imaging results will be communicated to you by meQuilibriumhart, letter or phone within 4 business days after the clinic has received the results. If you do not hear from us within 7 days, please contact the clinic through Not[employer] or phone. If you have a critical or abnormal lab result, we will notify you by phone as soon as possible.  Submit refill requests through [employer] or call your pharmacy and they will forward the refill request to us. Please allow 3 business days for your refill to be completed.          Additional Information About Your Visit        [employer] Information     [employer] gives you secure access to your electronic health record. If you see a primary care provider, you can also send messages to your care team and make appointments. If you have questions, please call your primary care clinic.  If you do not have a primary care provider, please call [phone] and they will assist you.        Care EveryWhere ID     This is your Care EveryWhere ID. This could be used by other organizations to access your [facility] medical records  [id number]         Blood Pressure from Last 3 Encounters:   04/05/18 110/81   03/29/18 120/81   03/22/18 122/84    Weight from Last 3 Encounters:   04/05/18 65.3 kg (144 lb)   03/29/18 65 kg (143 lb 3.2 oz)   03/22/18 67.6 kg (149 lb)              Today, you had the following     No orders found for display         Today's Medication Changes          These changes are accurate as of 4/5/18  4:17 PM.  If you have any questions, ask your nurse or doctor.               These medicines have changed or have updated prescriptions.        Dose/Directions    * fentaNYL 12 mcg/hr 72 hr patch   Commonly known as:  DURAGESIC   This may have changed:  Another medication with the same name was added. Make sure you understand how and when to take each.   Used for:  Cancer of sigmoid colon (H), Peritoneal carcinomatosis (H)   Changed by:  Linda Alves PA-C         Dose:  1 patch   Place 1 patch onto the skin every 72 hours remove old patch.   Quantity:  10 patch   Refills:  0       * fentaNYL 25 mcg/hr 72 hr patch   Commonly known as:  DURAGESIC   This may have changed:  You were already taking a medication with the same name, and this prescription was added. Make sure you understand how and when to take each.   Used for:  Cancer of sigmoid colon (H)   Changed by:  Linda Alves PA-C        Dose:  1 patch   Place 1 patch onto the skin every 72 hours remove old patch.   Quantity:  10 patch   Refills:  0       * Notice:  This list has 2 medication(s) that are the same as other medications prescribed for you. Read the directions carefully, and ask your doctor or other care provider to review them with you.         Where to get your medicines      Some of these will need a paper prescription and others can be bought over the counter.  Ask your nurse if you have questions.     Bring a paper prescription for each of these medications     fentaNYL 25 mcg/hr 72 hr patch                Primary Care Provider Office Phone # Fax #    Jaguar Becker [phone]       [address] [address]        Equal Access to Services     [facility] : Hadii aad ku hadasho Soshu, waaxda luqadaha, qaybta kaalmada lewis, bhargav sage . So [facility] [phone].    ATENCIÓN: Si habla español, tiene a cid disposición servicios gratuitos de asistencia lingüística. Llame al [phone].    We comply with applicable federal civil rights laws and [place] laws. We do not discriminate on the basis of race, color, national origin, age, disability, sex, sexual orientation, or gender identity.            Thank you!     Thank you for choosing [facility]  for your care. Our goal is always to provide you with excellent care. Hearing back from our patients is one way we can continue to improve our services.  Please take a few minutes to complete the written survey that you may receive in the mail after your visit with us. Thank you!             Your Updated Medication List - Protect others around you: Learn how to safely use, store and throw away your medicines at www.disposemymeds.org.          This list is accurate as of 4/5/18  4:17 PM.  Always use your most recent med list.                   Brand Name Dispense Instructions for use Diagnosis    amylase-lipase-protease 03291 UNITS Cpep    CREON    240 capsule    Take 2 capsules (72,000 Units) by mouth 3 times daily (with meals) And 1 capsule with each snack tid.    Diarrhea, unspecified type, Peritoneal carcinomatosis (H), Cancer of sigmoid colon (H)       dronabinol 2.5 MG capsule    MARINOL    60 capsule    2.5 mg by mouth bid prn AC for decreased appetite.    Peritoneal carcinomatosis (H), Anorexia       * fentaNYL 12 mcg/hr 72 hr patch    DURAGESIC    10 patch    Place 1 patch onto the skin every 72 hours remove old patch.    Cancer of sigmoid colon (H), Peritoneal carcinomatosis (H)       * fentaNYL 25 mcg/hr 72 hr patch    DURAGESIC    10 patch    Place 1 patch onto the skin every 72 hours remove old patch.    Cancer of sigmoid colon (H)       IRON SUPPLEMENT PO      Take 325 mg by mouth 2 times daily (with meals)        LORazepam 0.5 MG tablet    ATIVAN    30 tablet    Take 1 tablet (0.5 mg) by mouth every 4 hours as needed (Anxiety, Nausea/Vomiting or Sleep)    Peritoneal carcinomatosis (H), Cancer of sigmoid colon (H)       MULTIVITAMIN & MINERAL PO      Take 1 tablet by mouth daily        naloxone nasal spray    NARCAN    0.2 mL    Spray 1 spray (4 mg) into one nostril alternating nostrils as needed for opioid reversal every 2-3 minutes until assistance arrives    Cancer of sigmoid colon (H), Long term current use of opiate analgesic       omeprazole 20 MG CR capsule    priLOSEC    30 capsule    Take 1 capsule (20 mg) by mouth daily    Diarrhea, unspecified  type       ondansetron 8 MG ODT tab    ZOFRAN-ODT    60 tablet    Take 1 tablet (8 mg) by mouth every 8 hours    Nausea       * oxyCODONE 10 MG 12 hr tablet    OXYCONTIN    120 tablet    Take 2 tablets (20 mg) by mouth every 12 hours    Peritoneal carcinomatosis (H), Cancer of sigmoid colon (H)       * oxyCODONE 5 MG capsule    OXY-IR    100 capsule    Take 1-2 capsules (5-10 mg) by mouth every 4 hours as needed for moderate to severe pain    Peritoneal carcinomatosis (H)       * oxyCODONE IR 10 MG tablet    ROXICODONE    90 tablet    Take 0.5-1 tablets (5-10 mg) by mouth every 4 hours as needed for moderate to severe pain    Cancer of sigmoid colon (H), Peritoneal carcinomatosis (H)       parenteral nutrition - PTA/DISCHARGE ORDER      Inject into the vein daily See medication history note from 2/20/18 for most recent TPN formula.        Psyllium 51.7 % Pack    METAMUCIL FIBER    90 each    Take 1 packet by mouth 3 times daily    Diarrhea, unspecified type       * Notice:  This list has 5 medication(s) that are the same as other medications prescribed for you. Read the directions carefully, and ask your doctor or other care provider to review them with you.

## 2018-04-05 NOTE — MR AVS SNAPSHOT
After Visit Summary   4/5/2018    Sebas Lopez    MRN: [id number]           Patient Information     Date Of Birth          1965        Visit Information        Provider Department      4/5/2018 11:10 AM Linda Alves PA-C [facility]        Today's Diagnoses     Cancer of sigmoid colon (H)    -  1    Peritoneal carcinomatosis (H)           Follow-ups after your visit        Your next 10 appointments already scheduled     Apr 05, 2018 12:30 PM CDT   Infusion 240 with UC ONCOLOGY INFUSION, UC 26 ATC   [facility] ([facility])    [address]  [address]  [facility] [address]   [phone]            Apr 19, 2018 10:15 AM CDT   [facility] Draw with UC MASONIC LAB DRAW   [facility] Draw ([facility])    [address]  [address]  [facility] [address]   [phone]            Apr 19, 2018 11:10 AM CDT   (Arrive by 10:55 AM)   Return Visit with Linda Alves PA-C   [facility] ([facility])    [address]  [address]  [facility] [address]   [phone]            Apr 19, 2018 12:30 PM CDT   Infusion 240 with UC ONCOLOGY INFUSION, UC 30 ATC   [facility] ([facility])    [address]  [address]  [facility] [address]   [phone]            May 03, 2018 10:15 AM CDT   [facility] Lab Draw with UC MASONIC LAB DRAW   [facility] Draw ([facility])    [address]  [address]  [facility] [address]   [phone]            May 03, 2018 11:00 AM CDT   (Arrive by 10:45 AM)   CT CHEST/ABDOMEN/PELVIS W CONTRAST with UCCT1   [facility] [place] [facility] ([facility])    [address] [place] [address]  1st Floor  [facility] [address]   [phone]           Please bring any scans or X-rays taken at other  \[facility]\"", if similar tests were done. Also bring a list of your medicines, including vitamins, minerals and over-the-counter drugs. It is safest to leave personal items at home.  Be sure to tell your doctor:   If you have any allergies.   If there s any chance you are pregnant.   If you are breastfeeding.  You may have contrast for this exam. To prepare:   Do not eat or drink for 2 hours before your exam. If you need to take medicine, you may take it with small sips of water. (We may ask you to take liquid medicine as well.)   The day before your exam, drink extra fluids at least six 8-ounce glasses (unless your doctor tells you to restrict your fluids).   You will be given instructions on how to drink the contrast.  Patients over 70 or patients with diabetes or kidney problems:   If you haven t had a blood test (creatinine test) within the last 30 days, the Cardiologist/Radiologist may require you to get this test prior to your exam.  If you have diabetes:   Continue to take your metformin medication on the day of your exam  Please wear loose clothing, such as a sweat suit or jogging clothes. Avoid snaps, zippers and other metal. We may ask you to undress and put on a hospital gown.  If you have any questions, please call the Imaging Department where you will have your exam.            May 03, 2018  1:30 PM CDT   (Arrive by 1:15 PM)   Return Visit with Albert Long MD   [facility] ([facility])    [address] [place] [address]  [address]  [facility] [id number] [phone]            May 04, 2018 10:00 AM CDT   Infusion 240 with UC ONCOLOGY INFUSION   [facility] ([facility])    [address] [place] [address]  [address]  [facility] [id number] [phone]              Who to contact     If you have questions or need follow up information about today's clinic visit or your schedule please contact [facility]  directly at [phone].  Normal or non-critical lab and imaging results will be communicated to you by [employer], letter or phone within 4 business days after the clinic has received the results. If you do not hear from us within 7 days, please contact the clinic through [employer] or phone. If you have a critical or abnormal lab result, we will notify you by phone as soon as possible.  Submit refill requests through [employer] or call your pharmacy and they will forward the refill request to us. Please allow 3 business days for your refill to be completed.          Additional Information About Your Visit        [employer] Information     [employer] gives you secure access to your electronic health record. If you see a primary care provider, you can also send messages to your care team and make appointments. If you have questions, please call your primary care clinic.  If you do not have a primary care provider, please call [phone] and they will assist you.        Care EveryWhere ID     This is your Care EveryWhere ID. This could be used by other organizations to access your [facility] medical records  [id number]        Your Vitals Were     Pulse Temperature Respirations Pulse Oximetry BMI (Body Mass Index)       95 98.3  F (36.8  C) (Oral) 16 99% 21.26 kg/m2        Blood Pressure from Last 3 Encounters:   04/05/18 110/81   03/29/18 120/81   03/22/18 122/84    Weight from Last 3 Encounters:   04/05/18 65.3 kg (144 lb)   03/29/18 65 kg (143 lb 3.2 oz)   03/22/18 67.6 kg (149 lb)              Today, you had the following     No orders found for display         Today's Medication Changes          These changes are accurate as of 4/5/18 11:44 AM.  If you have any questions, ask your nurse or doctor.               These medicines have changed or have updated prescriptions.        Dose/Directions    * fentaNYL 12 mcg/hr 72 hr patch   Commonly known as:  DURAGESIC   This may have changed:  Another medication with the same name  was added. Make sure you understand how and when to take each.   Used for:  Cancer of sigmoid colon (H), Peritoneal carcinomatosis (H)   Changed by:  Linda Alves PA-C        Dose:  1 patch   Place 1 patch onto the skin every 72 hours remove old patch.   Quantity:  10 patch   Refills:  0       * fentaNYL 25 mcg/hr 72 hr patch   Commonly known as:  DURAGESIC   This may have changed:  You were already taking a medication with the same name, and this prescription was added. Make sure you understand how and when to take each.   Used for:  Cancer of sigmoid colon (H)   Changed by:  Joselyn, Linda Montez, PA-C        Dose:  1 patch   Place 1 patch onto the skin every 72 hours remove old patch.   Quantity:  10 patch   Refills:  0       * Notice:  This list has 2 medication(s) that are the same as other medications prescribed for you. Read the directions carefully, and ask your doctor or other care provider to review them with you.         Where to get your medicines      Some of these will need a paper prescription and others can be bought over the counter.  Ask your nurse if you have questions.     Bring a paper prescription for each of these medications     fentaNYL 25 mcg/hr 72 hr patch                Primary Care Provider Office Phone # Fax #    Jaguar Becker [phone]       [address] [address]        Equal Access to Services     MELVIN DUNN AH: Hadii karrie reddy hadasho Soomaali, waaxda luqadaha, qaybta kaalmada adeegyada, bhargav sage . So [facility] [phone].    ATENCIÓN: Si habla español, tiene a cid disposición servicios gratuitos de asistencia lingüística. Llame al [phone].    We comply with applicable federal civil rights laws and [place] laws. We do not discriminate on the basis of race, color, national origin, age, disability, sex, sexual orientation, or gender identity.            Thank you!     Thank you for choosing [facility]  for your care. Our goal is always to provide you with excellent care. Hearing back from our patients is one way we can continue to improve our services. Please take a few minutes to complete the written survey that you may receive in the mail after your visit with us. Thank you!             Your Updated Medication List - Protect others around you: Learn how to safely use, store and throw away your medicines at www.disposemymeds.org.          This list is accurate as of 4/5/18 11:44 AM.  Always use your most recent med list.                   Brand Name Dispense Instructions for use Diagnosis    amylase-lipase-protease 39072 UNITS Cpep    CREON    240 capsule    Take 2 capsules (72,000 Units) by mouth 3 times daily (with meals) And 1 capsule with each snack tid.    Diarrhea, unspecified type, Peritoneal carcinomatosis (H), Cancer of sigmoid colon (H)       dronabinol 2.5 MG capsule    MARINOL    60 capsule    2.5 mg by mouth bid prn AC for decreased appetite.    Peritoneal carcinomatosis (H), Anorexia       * fentaNYL 12 mcg/hr 72 hr patch    DURAGESIC    10 patch    Place 1 patch onto the skin every 72 hours remove old patch.    Cancer of sigmoid colon (H), Peritoneal carcinomatosis (H)       * fentaNYL 25 mcg/hr 72 hr patch    DURAGESIC    10 patch    Place 1 patch onto the skin every 72 hours remove old patch.    Cancer of sigmoid colon (H)       IRON SUPPLEMENT PO      Take 325 mg by mouth 2 times daily (with meals)        LORazepam 0.5 MG tablet    ATIVAN    30 tablet    Take 1 tablet (0.5 mg) by mouth every 4 hours as needed (Anxiety, Nausea/Vomiting or Sleep)    Peritoneal carcinomatosis (H), Cancer of sigmoid colon (H)       MULTIVITAMIN & MINERAL PO      Take 1 tablet by mouth daily        naloxone nasal spray    NARCAN    0.2 mL    Spray 1 spray (4 mg) into one nostril alternating nostrils as needed for opioid reversal every 2-3 minutes until assistance arrives    Cancer of  sigmoid colon (H), Long term current use of opiate analgesic       omeprazole 20 MG CR capsule    priLOSEC    30 capsule    Take 1 capsule (20 mg) by mouth daily    Diarrhea, unspecified type       ondansetron 8 MG ODT tab    ZOFRAN-ODT    60 tablet    Take 1 tablet (8 mg) by mouth every 8 hours    Nausea       * oxyCODONE 10 MG 12 hr tablet    OXYCONTIN    120 tablet    Take 2 tablets (20 mg) by mouth every 12 hours    Peritoneal carcinomatosis (H), Cancer of sigmoid colon (H)       * oxyCODONE 5 MG capsule    OXY-IR    100 capsule    Take 1-2 capsules (5-10 mg) by mouth every 4 hours as needed for moderate to severe pain    Peritoneal carcinomatosis (H)       * oxyCODONE IR 10 MG tablet    ROXICODONE    90 tablet    Take 0.5-1 tablets (5-10 mg) by mouth every 4 hours as needed for moderate to severe pain    Cancer of sigmoid colon (H), Peritoneal carcinomatosis (H)       parenteral nutrition - PTA/DISCHARGE ORDER      Inject into the vein daily See medication history note from 2/20/18 for most recent TPN formula.        Psyllium 51.7 % Pack    METAMUCIL FIBER    90 each    Take 1 packet by mouth 3 times daily    Diarrhea, unspecified type       * Notice:  This list has 5 medication(s) that are the same as other medications prescribed for you. Read the directions carefully, and ask your doctor or other care provider to review them with you.

## 2018-04-05 NOTE — LETTER
4/5/2018      RE: Sebas Lopez  [address] [place] [address]       Oncology follow up visit:  Date on this visit: Apr 5, 2018    CC  sigmoid adenocarcinoma with peritoneal carcinomatosis    Primary Physician: Waylon Han     History Of Present Illness:     Please see previous note for details. I have copied and updated from prior notes.   Sebas is a 52 year old male who has a history of ulcerative colitis diagnosed more than 20 years ago, but he has not had medical management for that.  He was doing well, but in 05/2017 he presented with a few weeks of abdominal pain.  At that time, his imaging showed that he had a sigmoid wall thickening with adjacent mesenteric lymphadenopathy and free fluid near the abdominal wall mesh from his prior hernia surgery.  He subsequently underwent a colonoscopy which was incomplete and showed an obstructing sigmoid colon mass.  The biopsy from the sigmoid mass showed sigmoid adenocarcinoma.  He then developed obstructive symptoms and underwent exploratory laparotomy on 07/10/2017.  During that he was found to have diffuse peritoneal carcinomatosis.  Extensive lysis of adhesions was performed and a small bowel resection was performed with end ileostomy.  The biopsies from the multiple large peritoneal metastasis also were positive for metastatic adenocarcinoma. We still haven't received testing on MSI or NGS panel back     He started palliative FOLFOX on 8/11/17. C#6 given on 10/26/17  After 6 cycles, he has stable disease    On 12/6/17, he had Diagnostic laparoscopy and Exploratory laparotomy, but HIPEC was not performed as Peritoneal cancer index was 26 with diffuse involvement of the small bowel as well as the small bowel mesentery.     He then presented to ED on 1/26 with abdominal pain and associated nausea. CT A/P on 1/26 with 5.2 x 4.5 x 3.6 cm proximal sigmoid mass causnig colonic obstriction with singificant distention of cecum (7cm in diameter),  ascending colon and proximal transverse colon. No pneumatosis or pneumoperitoneum. Also small volume of ascites with associated findings concerning for peritoneal carcinomatosis, increased from prior studies. Dr. Dudley was consulted and recommended no surgical intervention.    He was then seen in clinic on 1/29/2018 due to inability to tolerate solid foods and he was getting abdominal cramping and some nausea. He also noticed that his ostomy output decreased. He was given IV fluids as well as antibiotics and was started on OxyContin 10 mg twice a day along with p.r.n. oxycodone. Of note a few weeks prior he was started on short acting octreotide to decrease the ostomy output but he stopped taking it due to worsening of the abdominal cramping. A colonic stent was placed on 2/6/18.    He was recently hospitalized from 2/20-2/25/18 with presumed infectious colitis and bacteremia. His port was removed. He was discharged home on IV vancomycin. He resumed FOLFOX with dose reduced oxaliplatin due to previous neuropathy on 3/8/18. He was hospitalized with a bowel obstruction from 3/18-3/20/18 and a colonic stent was placed on 3/19/18. He comes in today for routine follow up.    Interim history  Patient reports that he had some nausea and abdominal pain this morning.  He took 10 mg of oxycodone and his pain and nausea improved.  Since increasing the fentanyl from 12-25, he feels his pain is under much better control.  Yesterday he took 10 mg of oxycodone twice.  Patient has noticed an increase in his ostomy output lately and has been emptying as often as every hour.  He did take tincture of opium as this was helpful for him previously.  He spends his days mostly around the house reading, going for occasional walks and going up and down his stairs a lot as his bathroom is upstairs.  He reports his sleep quality has been variable lately with his abdominal pain.  He denies any skin changes on his hands and feet.  He  continues on TPN 12 hours a day.  He has been eating some.  Yesterday, he ate 3 slices of pizza.  Patient feels he is doing fairly well with his fluid intake.  He does drink some pop as well as some G-tube.  He denies other concerns.    Review of Systems:  Patient denies any of the following except if noted above: fevers, chills, difficulty with energy, vision or hearing changes, chest pain, dyspnea, nausea, vomiting, constipation, urinary concerns, headaches, numbness, tingling, or issues with mood.    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     Ulcerative colitis (H)      Past Surgical History:   Procedure Laterality Date     COLONOSCOPY  2017     COLONOSCOPY N/A 11/30/2017    Procedure: COLONOSCOPY;  Colonoscopy;  Surgeon: Rob Dudley MD;  Location: [facility]     COLONOSCOPY N/A 2/6/2018    Procedure: COMBINED COLONOSCOPY, STENT PLACEMENT;  COMBINED COLONOSCOPY with Colonic Stent Placement;  Surgeon: Guru Lionel Mar MD;  Location: [facility]     COLONOSCOPY N/A 3/19/2018    Procedure: COMBINED COLONOSCOPY, STENT PLACEMENT;  flexible sigmoidoscopy with stent placement and dilation;  Surgeon: Alexis Rios MD;  Location: [facility]     GI SURGERY  07/10/2017    Extensive lysis of adhesions, small bowel resection with end ileostomy.      HERNIA REPAIR       INSERT PORT VASCULAR ACCESS Right 8/10/2017    Procedure: INSERT PORT VASCULAR ACCESS;  Single Lumen Right Chest Power Port;  Surgeon: Malena Andrew PA-C;  Location: [facility]     LAPAROSCOPY DIAGNOSTIC (GENERAL) N/A 12/6/2017    Procedure: LAPAROSCOPY DIAGNOSTIC (GENERAL);  Diagnostic Laparoscopy, Exploratory Laparotomy  Anesthesia Block ;  Surgeon: Rob Dudley MD;  Location: [facility]     LAPAROTOMY EXPLORATORY N/A 12/6/2017    Procedure: LAPAROTOMY EXPLORATORY;;  Surgeon: Rob Dudley MD;  Location: [facility]     ORTHOPEDIC SURGERY Left     HIP ARTHROPLASTY     PICC INSERTION Right 02/23/2018    5Fr DL BioFlo PICC, 44cm (3cm external) in the R basilic vein w/ tip in the SVC RA junction      Left hip replacement.     Allergies:  Allergies as of 04/05/2018 - Julius as Reviewed 03/30/2018   Allergen Reaction Noted     Liquid adhesive Rash 03/01/2018     Current Medications:  Current Outpatient Prescriptions   Medication Sig Dispense Refill     dronabinol (MARINOL) 2.5 MG capsule 2.5 mg by mouth bid prn AC for decreased appetite. 60 capsule 3     naloxone (NARCAN) nasal spray Spray 1 spray (4 mg) into one nostril alternating nostrils as needed for opioid reversal every 2-3 minutes until assistance arrives 0.2 mL 0     fentaNYL (DURAGESIC) 12 mcg/hr 72 hr patch Place 1 patch onto the skin every 72 hours remove old patch. 10 patch 0     oxyCODONE IR (ROXICODONE) 10 MG tablet Take 0.5-1 tablets (5-10 mg) by mouth every 4 hours as needed for moderate to severe pain 90 tablet 0     ondansetron (ZOFRAN-ODT) 8 MG ODT tab Take 1 tablet (8 mg) by mouth every 8 hours 60 tablet 0     Multiple Vitamins-Minerals (MULTIVITAMIN & MINERAL PO) Take 1 tablet by mouth daily       oxyCODONE (OXY-IR) 5 MG capsule Take 1-2 capsules (5-10 mg) by mouth every 4 hours as needed for moderate to severe pain 100 capsule 0     parenteral nutrition - PTA/DISCHARGE ORDER Inject into the vein daily See medication history note from 2/20/18 for most recent TPN formula.       oxyCODONE (OXYCONTIN) 10 MG 12 hr tablet Take 2 tablets (20 mg) by mouth every 12 hours 120 tablet 0     omeprazole (PRILOSEC) 20 MG CR capsule Take 1 capsule (20 mg) by mouth daily 30 capsule 11     amylase-lipase-protease (CREON) 89365 UNITS CPEP Take 2 capsules (72,000 Units) by  mouth 3 times daily (with meals) And 1 capsule with each snack tid. 240 capsule 3     LORazepam (ATIVAN) 0.5 MG tablet Take 1 tablet (0.5 mg) by mouth every 4 hours as needed (Anxiety, Nausea/Vomiting or Sleep) 30 tablet 5     Psyllium (METAMUCIL FIBER) 51.7 % PACK Take 1 packet by mouth 3 times daily 90 each 3     Ferrous Sulfate (IRON SUPPLEMENT PO) Take 325 mg by mouth 2 times daily (with meals)        Physical Exam:  General: The patient is a pleasant male in no acute distress.  /81 (BP Location: Left arm, Cuff Size: Adult Regular)  Pulse 95  Temp 98.3  F (36.8  C) (Oral)  Resp 16  Wt 65.3 kg (144 lb)  SpO2 99%  BMI 21.26 kg/m2  Wt Readings from Last 10 Encounters:   04/05/18 65.3 kg (144 lb)   03/29/18 65 kg (143 lb 3.2 oz)   03/22/18 67.6 kg (149 lb)   03/19/18 72.2 kg (159 lb 3.2 oz)   03/08/18 69.2 kg (152 lb 8 oz)   03/01/18 68.5 kg (151 lb 1.6 oz)   02/25/18 69.1 kg (152 lb 6.4 oz)   02/15/18 61.6 kg (135 lb 11.2 oz)   02/09/18 64.1 kg (141 lb 4.8 oz)   02/05/18 66.5 kg (146 lb 11.2 oz)   HEENT: EOMI, PERRL. Sclerae are anicteric. Oral mucosa is pink and moist with no lesions or thrush.   Lymph: Neck is supple with no lymphadenopathy in the cervical or supraclavicular areas.   Heart: Regular rate and rhythm.   Lungs: Clear to auscultation bilaterally.   Abdomen: Bowel sounds present, soft, mild central abdominal tenderness with central firmness, ostomy bag is in right abdomen with liquid green stool.  Extremities: No lower extremity edema noted bilaterally.   Neuro: Cranial nerves II through XII are grossly intact.  Skin: PICC line is in place in right arm.  No rashes or lesions on exposed skin.     Laboratory/Imaging Studies   4/5/2018 10:52   Sodium 129 (L)   Potassium 4.9   Chloride 98   Carbon Dioxide 24   Urea Nitrogen 39 (H)   Creatinine 1.01   GFR Estimate 77   GFR Estimate If Black >90   Calcium 9.3   Anion Gap 8   Magnesium 2.4 (H)   Phosphorus 4.2   Albumin 3.5   Protein Total 8.9 (H)    Bilirubin Total 0.3   Alkaline Phosphatase 248 (H)   ALT 74 (H)   AST 46 (H)   Glucose 105 (H)   WBC 9.9   Hemoglobin 11.7 (L)   Hematocrit 36.2 (L)   Platelet Count 330   RBC Count 4.21 (L)   MCV 86   MCH 27.8   MCHC 32.3   RDW 15.9 (H)   Diff Method Automated Method   % Neutrophils 62.9   % Lymphocytes 18.5   % Monocytes 13.4   % Eosinophils 4.0   % Basophils 0.7   % Immature Granulocytes 0.5   Nucleated RBCs 0   Absolute Neutrophil 6.2   Absolute Lymphocytes 1.8   Absolute Monocytes 1.3   Absolute Eosinophils 0.4   Absolute Basophils 0.1   Abs Immature Granulocytes 0.1   Absolute Nucleated RBC 0.0     NGS PANEL COLORECTAL    No mutations were identified in analyzed regions of KRAS, NRAS, BRAF, HRAS, or PIK3CA.     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  The sigmoid carcinoma is obstructing, requiring exploratory laparotomy and end ileostomy along with small bowel resection.    MSI intact and NGS panel does not reveal any identifiable mutations     He has been started on FOLFOX palliative chemotherapy. After 6 cycles, he had stable disease    On 12/6/17, he had Diagnostic laparoscopy and Exploratory laparotomy, but HIPEC was not performed as Peritoneal cancer index was 26 with diffuse involvement of the small bowel as well as the small bowel mesentery.     Repeat CT scan showed worsening peritoneal carcinomatosis and he is getting more symptomatic in terms of worsening abdominal pain and difficulty eating because of worsening abdominal cramping. He was admitted to the hospital with worsening colon distention and colon stent was placed which improved his symptoms. Now he is doing much better.     Plan was to resume FOLFOX, but then he was hospitalized with infectious colitis and bacteremia. He recovered well from this and resumed FOLFOX on 3/8/18. Because of previous neuropathy, the dose of oxaliplatin was decreased to 70 mg/m . We may consider adding Avatin or  anti-EGFR therapy in the future as well.     He is tolerating FOLFOX fairly well. He will continue with cycle 3 FOLFOX today. I will see him back in 2 weeks prior to cycle 4. Will plan to give him 4 cycles of FOLFOX before repeat imaging.    Abdominal pain due to peritoneal carcinomatosis and colon obstruction. This is much better after placement of another colonic stent. Pain was initially worse with switching from OxyContin 20 mg bid to Fentanyl 12 mcg/hr. The increase to Fentanyl 25 mcg/hr on 4/3/18 has significantly helped his pain. A refill of Fentanyl was given today. He will continue with oxycodone prn, currently 10 mg bid.    FEN. Patient remains on 12 hours TPN/day and is eating as tolerated. His weight is fairly stable. He has not yet started on the Marinol.   Hyponatremia. Will use 1L IV NS daily at home for the next week. Previous hyponatremia was associated with dehydration and improved with IVF.     History of iron deficient anemia. Patient received IV iron in the August 2018. Hemoglobin has stabilized. Iron studies in early March 2018 show replete iron stores. He will continue on oral iron.    High output ostomy. Discussed okay to resume tincture of opium. Will need to use cautiously, given history of bowel obstruction.     Linda Alves PA-C  [facility]  [address] [place] [address] [phone]

## 2018-04-05 NOTE — PROGRESS NOTES
Oncology follow up visit:  Date on this visit: Apr 5, 2018    CC  sigmoid adenocarcinoma with peritoneal carcinomatosis    Primary Physician: Waylon Han     History Of Present Illness:     Please see previous note for details. I have copied and updated from prior notes.   Sebas is a 52 year old male who has a history of ulcerative colitis diagnosed more than 20 years ago, but he has not had medical management for that.  He was doing well, but in 05/2017 he presented with a few weeks of abdominal pain.  At that time, his imaging showed that he had a sigmoid wall thickening with adjacent mesenteric lymphadenopathy and free fluid near the abdominal wall mesh from his prior hernia surgery.  He subsequently underwent a colonoscopy which was incomplete and showed an obstructing sigmoid colon mass.  The biopsy from the sigmoid mass showed sigmoid adenocarcinoma.  He then developed obstructive symptoms and underwent exploratory laparotomy on 07/10/2017.  During that he was found to have diffuse peritoneal carcinomatosis.  Extensive lysis of adhesions was performed and a small bowel resection was performed with end ileostomy.  The biopsies from the multiple large peritoneal metastasis also were positive for metastatic adenocarcinoma. We still haven't received testing on MSI or NGS panel back     He started palliative FOLFOX on 8/11/17. C#6 given on 10/26/17  After 6 cycles, he has stable disease    On 12/6/17, he had Diagnostic laparoscopy and Exploratory laparotomy, but HIPEC was not performed as Peritoneal cancer index was 26 with diffuse involvement of the small bowel as well as the small bowel mesentery.     He then presented to ED on 1/26 with abdominal pain and associated nausea. CT A/P on 1/26 with 5.2 x 4.5 x 3.6 cm proximal sigmoid mass causnig colonic obstriction with singificant distention of cecum (7cm in diameter), ascending colon and proximal transverse colon. No pneumatosis or pneumoperitoneum. Also  small volume of ascites with associated findings concerning for peritoneal carcinomatosis, increased from prior studies. Dr. Dudley was consulted and recommended no surgical intervention.    He was then seen in clinic on 1/29/2018 due to inability to tolerate solid foods and he was getting abdominal cramping and some nausea. He also noticed that his ostomy output decreased. He was given IV fluids as well as antibiotics and was started on OxyContin 10 mg twice a day along with p.r.n. oxycodone. Of note a few weeks prior he was started on short acting octreotide to decrease the ostomy output but he stopped taking it due to worsening of the abdominal cramping. A colonic stent was placed on 2/6/18.    He was recently hospitalized from 2/20-2/25/18 with presumed infectious colitis and bacteremia. His port was removed. He was discharged home on IV vancomycin. He resumed FOLFOX with dose reduced oxaliplatin due to previous neuropathy on 3/8/18. He was hospitalized with a bowel obstruction from 3/18-3/20/18 and a colonic stent was placed on 3/19/18. He comes in today for routine follow up.    Interim history  Patient reports that he had some nausea and abdominal pain this morning.  He took 10 mg of oxycodone and his pain and nausea improved.  Since increasing the fentanyl from 12-25, he feels his pain is under much better control.  Yesterday he took 10 mg of oxycodone twice.  Patient has noticed an increase in his ostomy output lately and has been emptying as often as every hour.  He did take tincture of opium as this was helpful for him previously.  He spends his days mostly around the house reading, going for occasional walks and going up and down his stairs a lot as his bathroom is upstairs.  He reports his sleep quality has been variable lately with his abdominal pain.  He denies any skin changes on his hands and feet.  He continues on TPN 12 hours a day.  He has been eating some.  Yesterday, he ate 3 slices of pizza.   Patient feels he is doing fairly well with his fluid intake.  He does drink some pop as well as some G-tube.  He denies other concerns.    Review of Systems:  Patient denies any of the following except if noted above: fevers, chills, difficulty with energy, vision or hearing changes, chest pain, dyspnea, nausea, vomiting, constipation, urinary concerns, headaches, numbness, tingling, or issues with mood.    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     Ulcerative colitis (H)      Past Surgical History:   Procedure Laterality Date     COLONOSCOPY  2017     COLONOSCOPY N/A 11/30/2017    Procedure: COLONOSCOPY;  Colonoscopy;  Surgeon: Rob Dudley MD;  Location: [facility]     COLONOSCOPY N/A 2/6/2018    Procedure: COMBINED COLONOSCOPY, STENT PLACEMENT;  COMBINED COLONOSCOPY with Colonic Stent Placement;  Surgeon: Guru Lionel Mar MD;  Location: [facility]     COLONOSCOPY N/A 3/19/2018    Procedure: COMBINED COLONOSCOPY, STENT PLACEMENT;  flexible sigmoidoscopy with stent placement and dilation;  Surgeon: Alexis Rios MD;  Location: [facility]     GI SURGERY  07/10/2017    Extensive lysis of adhesions, small bowel resection with end ileostomy.      HERNIA REPAIR       INSERT PORT VASCULAR ACCESS Right 8/10/2017    Procedure: INSERT PORT VASCULAR ACCESS;  Single Lumen Right Chest Power Port;  Surgeon: Malena Andrew PA-C;  [facility]: [facility]     LAPAROSCOPY DIAGNOSTIC (GENERAL) N/A 12/6/2017    Procedure: LAPAROSCOPY DIAGNOSTIC (GENERAL);  Diagnostic Laparoscopy, Exploratory Laparotomy Anesthesia Block ;  Surgeon: Rob Dudley MD;  Location: [facility]     LAPAROTOMY  EXPLORATORY N/A 12/6/2017    Procedure: LAPAROTOMY EXPLORATORY;;  Surgeon: Rob Dudley MD;  Location: [facility]     ORTHOPEDIC SURGERY Left     HIP ARTHROPLASTY     PICC INSERTION Right 02/23/2018    5Fr DL BioFlo PICC, 44cm (3cm external) in the R basilic vein w/ tip in the SVC RA junction      Left hip replacement.     Allergies:  Allergies as of 04/05/2018 - Julius as Reviewed 03/30/2018   Allergen Reaction Noted     Liquid adhesive Rash 03/01/2018     Current Medications:  Current Outpatient Prescriptions   Medication Sig Dispense Refill     dronabinol (MARINOL) 2.5 MG capsule 2.5 mg by mouth bid prn AC for decreased appetite. 60 capsule 3     naloxone (NARCAN) nasal spray Spray 1 spray (4 mg) into one nostril alternating nostrils as needed for opioid reversal every 2-3 minutes until assistance arrives 0.2 mL 0     fentaNYL (DURAGESIC) 12 mcg/hr 72 hr patch Place 1 patch onto the skin every 72 hours remove old patch. 10 patch 0     oxyCODONE IR (ROXICODONE) 10 MG tablet Take 0.5-1 tablets (5-10 mg) by mouth every 4 hours as needed for moderate to severe pain 90 tablet 0     ondansetron (ZOFRAN-ODT) 8 MG ODT tab Take 1 tablet (8 mg) by mouth every 8 hours 60 tablet 0     Multiple Vitamins-Minerals (MULTIVITAMIN & MINERAL PO) Take 1 tablet by mouth daily       oxyCODONE (OXY-IR) 5 MG capsule Take 1-2 capsules (5-10 mg) by mouth every 4 hours as needed for moderate to severe pain 100 capsule 0     parenteral nutrition - PTA/DISCHARGE ORDER Inject into the vein daily See medication history note from 2/20/18 for most recent TPN formula.       oxyCODONE (OXYCONTIN) 10 MG 12 hr tablet Take 2 tablets (20 mg) by mouth every 12 hours 120 tablet 0     omeprazole (PRILOSEC) 20 MG CR capsule Take 1 capsule (20 mg) by mouth daily 30 capsule 11     amylase-lipase-protease (CREON) 76131 UNITS CPEP Take 2 capsules (72,000 Units) by mouth 3 times daily (with meals) And 1 capsule with each snack tid. 240 capsule 3      LORazepam (ATIVAN) 0.5 MG tablet Take 1 tablet (0.5 mg) by mouth every 4 hours as needed (Anxiety, Nausea/Vomiting or Sleep) 30 tablet 5     Psyllium (METAMUCIL FIBER) 51.7 % PACK Take 1 packet by mouth 3 times daily 90 each 3     Ferrous Sulfate (IRON SUPPLEMENT PO) Take 325 mg by mouth 2 times daily (with meals)        Physical Exam:  General: The patient is a pleasant male in no acute distress.  /81 (BP Location: Left arm, Cuff Size: Adult Regular)  Pulse 95  Temp 98.3  F (36.8  C) (Oral)  Resp 16  Wt 65.3 kg (144 lb)  SpO2 99%  BMI 21.26 kg/m2  Wt Readings from Last 10 Encounters:   04/05/18 65.3 kg (144 lb)   03/29/18 65 kg (143 lb 3.2 oz)   03/22/18 67.6 kg (149 lb)   03/19/18 72.2 kg (159 lb 3.2 oz)   03/08/18 69.2 kg (152 lb 8 oz)   03/01/18 68.5 kg (151 lb 1.6 oz)   02/25/18 69.1 kg (152 lb 6.4 oz)   02/15/18 61.6 kg (135 lb 11.2 oz)   02/09/18 64.1 kg (141 lb 4.8 oz)   02/05/18 66.5 kg (146 lb 11.2 oz)   HEENT: EOMI, PERRL. Sclerae are anicteric. Oral mucosa is pink and moist with no lesions or thrush.   Lymph: Neck is supple with no lymphadenopathy in the cervical or supraclavicular areas.   Heart: Regular rate and rhythm.   Lungs: Clear to auscultation bilaterally.   Abdomen: Bowel sounds present, soft, mild central abdominal tenderness with central firmness, ostomy bag is in right abdomen with liquid green stool.  Extremities: No lower extremity edema noted bilaterally.   Neuro: Cranial nerves II through XII are grossly intact.  Skin: PICC line is in place in right arm.  No rashes or lesions on exposed skin.     Laboratory/Imaging Studies   4/5/2018 10:52   Sodium 129 (L)   Potassium 4.9   Chloride 98   Carbon Dioxide 24   Urea Nitrogen 39 (H)   Creatinine 1.01   GFR Estimate 77   GFR Estimate If Black >90   Calcium 9.3   Anion Gap 8   Magnesium 2.4 (H)   Phosphorus 4.2   Albumin 3.5   Protein Total 8.9 (H)   Bilirubin Total 0.3   Alkaline Phosphatase 248 (H)   ALT 74 (H)   AST 46 (H)    Glucose 105 (H)   WBC 9.9   Hemoglobin 11.7 (L)   Hematocrit 36.2 (L)   Platelet Count 330   RBC Count 4.21 (L)   MCV 86   MCH 27.8   MCHC 32.3   RDW 15.9 (H)   Diff Method Automated Method   % Neutrophils 62.9   % Lymphocytes 18.5   % Monocytes 13.4   % Eosinophils 4.0   % Basophils 0.7   % Immature Granulocytes 0.5   Nucleated RBCs 0   Absolute Neutrophil 6.2   Absolute Lymphocytes 1.8   Absolute Monocytes 1.3   Absolute Eosinophils 0.4   Absolute Basophils 0.1   Abs Immature Granulocytes 0.1   Absolute Nucleated RBC 0.0     NGS PANEL COLORECTAL    No mutations were identified in analyzed regions of KRAS, NRAS, BRAF, HRAS, or PIK3CA.     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  The sigmoid carcinoma is obstructing, requiring exploratory laparotomy and end ileostomy along with small bowel resection.    MSI intact and NGS panel does not reveal any identifiable mutations     He has been started on FOLFOX palliative chemotherapy. After 6 cycles, he had stable disease    On 12/6/17, he had Diagnostic laparoscopy and Exploratory laparotomy, but HIPEC was not performed as Peritoneal cancer index was 26 with diffuse involvement of the small bowel as well as the small bowel mesentery.     Repeat CT scan showed worsening peritoneal carcinomatosis and he is getting more symptomatic in terms of worsening abdominal pain and difficulty eating because of worsening abdominal cramping. He was admitted to the hospital with worsening colon distention and colon stent was placed which improved his symptoms. Now he is doing much better.     Plan was to resume FOLFOX, but then he was hospitalized with infectious colitis and bacteremia. He recovered well from this and resumed FOLFOX on 3/8/18. Because of previous neuropathy, the dose of oxaliplatin was decreased to 70 mg/m . We may consider adding Avatin or anti-EGFR therapy in the future as well.     He is tolerating FOLFOX fairly  well. He will continue with cycle 3 FOLFOX today. I will see him back in 2 weeks prior to cycle 4. Will plan to give him 4 cycles of FOLFOX before repeat imaging.    Abdominal pain due to peritoneal carcinomatosis and colon obstruction. This is much better after placement of another colonic stent. Pain was initially worse with switching from OxyContin 20 mg bid to Fentanyl 12 mcg/hr. The increase to Fentanyl 25 mcg/hr on 4/3/18 has significantly helped his pain. A refill of Fentanyl was given today. He will continue with oxycodone prn, currently 10 mg bid.    FEN. Patient remains on 12 hours TPN/day and is eating as tolerated. His weight is fairly stable. He has not yet started on the Marinol.   Hyponatremia. Will use 1L IV NS daily at home for the next week. Previous hyponatremia was associated with dehydration and improved with IVF.     History of iron deficient anemia. Patient received IV iron in the August 2018. Hemoglobin has stabilized. Iron studies in early March 2018 show replete iron stores. He will continue on oral iron.    High output ostomy. Discussed okay to resume tincture of opium. Will need to use cautiously, given history of bowel obstruction.     Linda Alves PA-C  [facility]  [address] [place] [address] [phone]

## 2018-04-05 NOTE — PROGRESS NOTES
Social Work Follow-Up Encounter Visit  Oncology Clinic    Data/Intervention:  Patient Name:  Sebas Lopez  /Age:  1965 (52 year old)    Reason for Follow-Up:  Financial questions    Collaborated With:    -palliative care RNCC-Oriana  -patient  -pt's motherCameron    Resources Provided:  [employer] brochure    Assessment:  Met with pt and pt's mother during infusion appt re: financial questions.  Pt states wanting to be proactive as he goes thru treatment and anticipating unable to return to work.  SW inquired about social security disability.  Pt states he has already applied but needs to provide SSA with additional information by May and he is doing that.  Pt inquired about insurance options.  SW discussed if approved for SSD that approx 2 years after pt would be eligible for Medicare.  In the interim encouraged pt to contact his [facility]  office re: applying for medical assistance/[employer], although due to pt's assets pt may potentially have a spenddown.  SW reviewed [employer] financial assistance, pt open to receiving booklet but not wanting to apply at this time, just wanting to gather info.  Pt stated no other questions or concerns at this time.     Plan:  Provided patient/family with SW's contact information and availability.         CIARA Jeronimo, MSW   - [facility]  Phone: [phone]  Pager: [phone]  2018

## 2018-04-06 NOTE — PROGRESS NOTES
This is a recent snapshot of the patient's [facility] medical record.  For current drug dose and complete information and questions, call [phone] or In Basket pool, fv home infusion ([id number])  CSN Number:  [id number]

## 2018-04-11 NOTE — PROGRESS NOTES
This is a recent snapshot of the patient's [facility] medical record.  For current drug dose and complete information and questions, call [phone] or In Basket pool, fv home infusion ([id number])  CSN Number:  [id number]

## 2018-04-16 NOTE — PROGRESS NOTES
This is a recent snapshot of the patient's [facility] medical record.  For current drug dose and complete information and questions, call [phone] or In Basket pool, fv home infusion ([id number])  CSN Number:  [id number]

## 2018-04-17 NOTE — PROGRESS NOTES
At Dr Long's request, placed a call to [facility] due to newly elevated Creatinine, to assess how he is doing.  LVM requesting a return call to discuss any symptoms that he is having. He is on TPN and has IVF prn to administer at home. Requested that he infuse 1L of the PRN IVF tonight for elevated creat if he has not already done so today.  He is scheduled to see Linda Alves PA-C on Thursday 4/19  Dr Long updated.

## 2018-04-18 NOTE — PROGRESS NOTES
This is a recent snapshot of the patient's [facility] medical record.  For current drug dose and complete information and questions, call [phone] or In Basket pool, fv home infusion ([id number])  CSN Number:  [id number]

## 2018-04-19 NOTE — PROGRESS NOTES
This is a recent snapshot of the patient's [facility] medical record.  For current drug dose and complete information and questions, call [phone] or In Basket pool, fv home infusion ([id number])  CSN Number:  [id number]

## 2018-04-19 NOTE — NURSING NOTE
Port accessed by RN, pt tolerated well. Labs collected and sent, line flushed with NS and heparin. Vitals taken and pt checked in for next appt.   Siobhan Howe

## 2018-04-19 NOTE — MR AVS SNAPSHOT
After Visit Summary   4/19/2018    Sebas Lopez    MRN: [id number]           Patient Information     Date Of Birth          1965        Visit Information        Provider Department      4/19/2018 11:10 AM Linda Alves PA-C [facility]        Today's Diagnoses     Cancer of sigmoid colon (H)    -  1    Peritoneal carcinomatosis (H)        Anorexia           Follow-ups after your visit        Additional Services     ACUPUNCTURE REFERRAL       Please see for abdominal pain.    Your provider has referred you to: PREFERRED PROVIDERS:    Please be aware that coverage of these services is subject to the terms and limitations of your health insurance plan.  Call member services at your health plan with any benefit or coverage questions.      Please bring the following with you to your appointment:    (1) Any X-Rays, CTs or MRIs which have been performed.  Contact the facility where they were done to arrange for  prior to your scheduled appointment.  (2) List of current medications   (3) This referral request   (4) Any documents/labs given to you for this referral  Services Requested: acupuncture    Please answer the following questions:    1. How long have you been treating this patient for this pain issue?      11 month(s)    2. Have there been any of the following problematic behaviors?      Medication Management Issues: NO      Chemical Dependency: NO      Psychiatric History or Current Psych/Social Issues: NO    3. Has the patient been to any other pain clinics and/or programs?      NO                  Your next 10 appointments already scheduled     Apr 19, 2018 12:30 PM CDT   Infusion 240 with  ONCOLOGY INFUSION, UC 30 ATC   [facility] ([facility])    [address] [place] [address]  [address]  [facility] [id number] [phone]            May 03, 2018 10:15 AM CDT   [facility] Lab Draw with UC [facility] LAB DRAW   [facility] ([place] [facility])    [address]  [address]  [facility] [id number] [phone]            May 03, 2018 11:00 AM CDT   (Arrive by 10:45 AM)   CT CHEST/ABDOMEN/PELVIS W CONTRAST with UCCT1   [facility] ([place] [facility])    [address]  1st Floor  [facility] [id number] [phone]           Please bring any scans or X-rays taken at other hospitals, if similar tests were done. Also bring a list of your medicines, including vitamins, minerals and over-the-counter drugs. It is safest to leave personal items at home.  Be sure to tell your doctor:   If you have any allergies.   If there s any chance you are pregnant.   If you are breastfeeding.  You may have contrast for this exam. To prepare:   Do not eat or drink for 2 hours before your exam. If you need to take medicine, you may take it with small sips of water. (We may ask you to take liquid medicine as well.)   The day before your exam, drink extra fluids at least six 8-ounce glasses (unless your doctor tells you to restrict your fluids).   You will be given instructions on how to drink the contrast.  Patients over 70 or patients with diabetes or kidney problems:   If you haven t had a blood test (creatinine test) within the last 30 days, the Cardiologist/Radiologist may require you to get this test prior to your exam.  If you have diabetes:   Continue to take your metformin medication on the day of your exam  Please wear loose clothing, such as a sweat suit or jogging clothes. Avoid snaps, zippers and other metal. We may ask you to undress and put on a hospital gown.  If you have any questions, please call the Imaging Department where you will have your exam.            May 03, 2018  1:30 PM CDT   (Arrive by 1:15 PM)   Return Visit with Albert Long MD   [facility] ([place] [facility])    [address]  [address]  [facility] [id number] [phone]            May 04, 2018 10:00 AM CDT   Infusion 240 with UC ONCOLOGY INFUSION, UC 17 ATC   [facility] ([facility])    [address]  [address]  [facility] [id number] [phone]            May 17, 2018  2:15 PM CDT   (Arrive by 2:00 PM)   Return Visit with Sukhdeep Mota MD   [facility] ([facility])    [address] [place] [address] Se  [address]  [facility] [id number] [phone]              Who to contact     If you have questions or need follow up information about today's clinic visit or your schedule please contact [facility] directly at [phone].  Normal or non-critical lab and imaging results will be communicated to you by [employer], letter or phone within 4 business days after the clinic has received the results. If you do not hear from us within 7 days, please contact the clinic through [employer] or phone. If you have a critical or abnormal lab result, we will notify you by phone as soon as possible.  Submit refill requests through [employer] or call your pharmacy and they will forward the refill request to us. Please allow 3 business days for your refill to be completed.          Additional Information About Your Visit        [employer] Information     [employer] gives you secure access to your electronic health record. If you see a primary care provider, you can also send messages to your care team and make appointments. If you have questions, please call your primary care clinic.  If you do not have a primary care provider, please call [phone] and they will assist you.        Care EveryWhere ID     This is your Care EveryWhere ID. This could be used by other organizations to access your [facility] medical records  [id number]        Your Vitals Were     Pulse Temperature Respirations Height Pulse Oximetry BMI (Body Mass Index)    107 98.3  F (36.8  " C) (Oral) 18 1.753 m (5' 9.02\") 99% 21.21 kg/m2       Blood Pressure from Last 3 Encounters:   04/19/18 108/76   04/05/18 110/81   03/29/18 120/81    Weight from Last 3 Encounters:   04/19/18 65.2 kg (143 lb 11.2 oz)   04/05/18 65.3 kg (144 lb)   03/29/18 65 kg (143 lb 3.2 oz)              We Performed the Following     ACUPUNCTURE REFERRAL          Today's Medication Changes          These changes are accurate as of 4/19/18 12:10 PM.  If you have any questions, ask your nurse or doctor.               These medicines have changed or have updated prescriptions.        Dose/Directions    dronabinol 5 MG capsule   Commonly known as:  MARINOL   This may have changed:    - medication strength  - how much to take  - how to take this  - when to take this  - additional instructions   Used for:  Peritoneal carcinomatosis (H), Anorexia   Changed by:  Linda Alves PA-C        Dose:  5 mg   Take 1 capsule (5 mg) by mouth 2 times daily (before meals)   Quantity:  60 capsule   Refills:  0       fentaNYL 25 mcg/hr 72 hr patch   Commonly known as:  DURAGESIC   This may have changed:  Another medication with the same name was removed. Continue taking this medication, and follow the directions you see here.   Used for:  Cancer of sigmoid colon (H)   Changed by:  Linda Alves PA-C        Dose:  1 patch   Place 1 patch onto the skin every 72 hours remove old patch.   Quantity:  10 patch   Refills:  0       * oxyCODONE 10 MG 12 hr tablet   Commonly known as:  OXYCONTIN   This may have changed:  Another medication with the same name was removed. Continue taking this medication, and follow the directions you see here.   Used for:  Peritoneal carcinomatosis (H), Cancer of sigmoid colon (H)   Changed by:  Linda Alves PA-C        Dose:  20 mg   Take 2 tablets (20 mg) by mouth every 12 hours   Quantity:  120 tablet   Refills:  0       * oxyCODONE IR 10 MG tablet   Commonly known as:  ROXICODONE   This may have " changed:  Another medication with the same name was removed. Continue taking this medication, and follow the directions you see here.   Used for:  Cancer of sigmoid colon (H), Peritoneal carcinomatosis (H)   Changed by:  Linda Alves PA-C        Dose:  5-10 mg   Take 0.5-1 tablets (5-10 mg) by mouth every 4 hours as needed for moderate to severe pain   Quantity:  90 tablet   Refills:  0       * Notice:  This list has 2 medication(s) that are the same as other medications prescribed for you. Read the directions carefully, and ask your doctor or other care provider to review them with you.         Where to get your medicines      Some of these will need a paper prescription and others can be bought over the counter.  Ask your nurse if you have questions.     Bring a paper prescription for each of these medications     dronabinol 5 MG capsule    oxyCODONE IR 10 MG tablet               Information about OPIOIDS     PRESCRIPTION OPIOIDS: WHAT YOU NEED TO KNOW   You have a prescription for an opioid (narcotic) pain medicine. Opioids can cause addiction. If you have a history of chemical dependency of any type, you are at a higher risk of becoming addicted to opioids. Only take this medicine after all other options have been tried. Take it for as short a time and as few doses as possible.     Do not:    Drive. If you drive while taking these medicines, you could be arrested for driving under the influence (DUI).    Operate heavy machinery    Do any other dangerous activities while taking these medicines.     Drink any alcohol while taking these medicines.      Take with any other medicines that contain acetaminophen. Read all labels carefully. Look for the word  acetaminophen  or  Tylenol.  Ask your pharmacist if you have questions or are unsure.    Store your pills in a secure place, locked if possible. We will not replace any lost or stolen medicine. If you don t finish your medicine, please throw away (dispose)  as directed by your pharmacist. The [place] Pollution [employer] has more information about safe disposal: https://www.pca.[facility].[place].us/living-green/managing-unwanted-medications    All opioids tend to cause constipation. Drink plenty of water and eat foods that have a lot of fiber, such as fruits, vegetables, prune juice, apple juice and high-fiber cereal. Take a laxative (Miralax, milk of magnesia, Colace, Senna) if you don t move your bowels at least every other day.          Primary Care Provider Office Phone # Fax #    Jaguar Becker [phone]       [facility] [address]  [facility] [address]        Equal Access to Services     SARAH DUNN : Hadii aad ku hadasho Soshu, waaxda luqadaha, qaybta kaalmada adeegyarita, bhargav sage . So [facility] [phone].    ATENCIÓN: Si habla español, tiene a cid disposición servicios gratuitos de asistencia lingüística. Llame al [phone].    We comply with applicable federal civil rights laws and [place] laws. We do not discriminate on the basis of race, color, national origin, age, disability, sex, sexual orientation, or gender identity.            Thank you!     Thank you for choosing [facility]  for your care. Our goal is always to provide you with excellent care. Hearing back from our patients is one way we can continue to improve our services. Please take a few minutes to complete the written survey that you may receive in the mail after your visit with us. Thank you!             Your Updated Medication List - Protect others around you: Learn how to safely use, store and throw away your medicines at www.disposemymeds.org.          This list is accurate as of 4/19/18 12:10 PM.  Always use your most recent med list.                   Brand Name Dispense Instructions for use Diagnosis    amylase-lipase-protease 85043 units Cpep    CREON    240 capsule    Take 2 capsules (72,000 Units) by  mouth 3 times daily (with meals) And 1 capsule with each snack tid.    Diarrhea, unspecified type, Peritoneal carcinomatosis (H), Cancer of sigmoid colon (H)       dronabinol 5 MG capsule    MARINOL    60 capsule    Take 1 capsule (5 mg) by mouth 2 times daily (before meals)    Peritoneal carcinomatosis (H), Anorexia       fentaNYL 25 mcg/hr 72 hr patch    DURAGESIC    10 patch    Place 1 patch onto the skin every 72 hours remove old patch.    Cancer of sigmoid colon (H)       IRON SUPPLEMENT PO      Take 325 mg by mouth 2 times daily (with meals)        LORazepam 0.5 MG tablet    ATIVAN    30 tablet    Take 1 tablet (0.5 mg) by mouth every 4 hours as needed (Anxiety, Nausea/Vomiting or Sleep)    Peritoneal carcinomatosis (H), Cancer of sigmoid colon (H)       MULTIVITAMIN & MINERAL PO      Take 1 tablet by mouth daily        naloxone nasal spray    NARCAN    0.2 mL    Spray 1 spray (4 mg) into one nostril alternating nostrils as needed for opioid reversal every 2-3 minutes until assistance arrives    Cancer of sigmoid colon (H), Long term current use of opiate analgesic       omeprazole 20 MG CR capsule    priLOSEC    30 capsule    Take 1 capsule (20 mg) by mouth daily    Diarrhea, unspecified type       ondansetron 8 MG ODT tab    ZOFRAN-ODT    60 tablet    Take 1 tablet (8 mg) by mouth every 8 hours as needed for nausea    Nausea       * oxyCODONE 10 MG 12 hr tablet    OXYCONTIN    120 tablet    Take 2 tablets (20 mg) by mouth every 12 hours    Peritoneal carcinomatosis (H), Cancer of sigmoid colon (H)       * oxyCODONE IR 10 MG tablet    ROXICODONE    90 tablet    Take 0.5-1 tablets (5-10 mg) by mouth every 4 hours as needed for moderate to severe pain    Cancer of sigmoid colon (H), Peritoneal carcinomatosis (H)       parenteral nutrition - PTA/DISCHARGE ORDER      Inject into the vein daily See medication history note from 2/20/18 for most recent TPN formula.        Potassium Chloride 40 MEQ/15ML (20%) Soln      52.5 mL    Take 7.5 mLs (20 mEq) by mouth daily        Psyllium 51.7 % Pack    METAMUCIL FIBER    90 each    Take 1 packet by mouth 3 times daily    Diarrhea, unspecified type       * Notice:  This list has 2 medication(s) that are the same as other medications prescribed for you. Read the directions carefully, and ask your doctor or other care provider to review them with you.

## 2018-04-19 NOTE — NURSING NOTE
"Oncology Rooming Note    April 19, 2018 11:01 AM   Sebas Lopez is a 52 year old male who presents for:    Chief Complaint   Patient presents with     Port Draw     Oncology Clinic Visit     Return visit related to Colon Cancer     Initial Vitals: /76 (BP Location: Right arm, Patient Position: Sitting, Cuff Size: Adult Regular)  Pulse 107  Temp 98.3  F (36.8  C) (Oral)  Resp 18  Ht 1.753 m (5' 9.02\")  Wt 65.2 kg (143 lb 11.2 oz)  SpO2 99%  BMI 21.21 kg/m2 Estimated body mass index is 21.21 kg/(m^2) as calculated from the following:    Height as of this encounter: 1.753 m (5' 9.02\").    Weight as of this encounter: 65.2 kg (143 lb 11.2 oz). Body surface area is 1.78 meters squared.  No Pain (0) Comment: Data Unavailable   No LMP for male patient.  Allergies reviewed: Yes  Medications reviewed: Yes    Medications: MEDICATION REFILLS NEEDED TODAY. Provider was notified.  Pharmacy name entered into [facility]: [facility] [place] - [address]    Clinical concerns: Refill needed for pain medication. Provider was notified.    10 minutes for nursing intake (face to face time)     Gina Gutierrez LPN            "

## 2018-04-19 NOTE — PROGRESS NOTES
Oncology follow up visit:  Date on this visit: Apr 19, 2018    CC  sigmoid adenocarcinoma with peritoneal carcinomatosis    Primary Physician: Waylon Han     History Of Present Illness:     Please see previous note for details. I have copied and updated from prior notes.   Sebas is a 52 year old male who has a history of ulcerative colitis diagnosed more than 20 years ago, but he has not had medical management for that.  He was doing well, but in 05/2017 he presented with a few weeks of abdominal pain.  At that time, his imaging showed that he had a sigmoid wall thickening with adjacent mesenteric lymphadenopathy and free fluid near the abdominal wall mesh from his prior hernia surgery.  He subsequently underwent a colonoscopy which was incomplete and showed an obstructing sigmoid colon mass.  The biopsy from the sigmoid mass showed sigmoid adenocarcinoma.  He then developed obstructive symptoms and underwent exploratory laparotomy on 07/10/2017.  During that he was found to have diffuse peritoneal carcinomatosis.  Extensive lysis of adhesions was performed and a small bowel resection was performed with end ileostomy.  The biopsies from the multiple large peritoneal metastasis also were positive for metastatic adenocarcinoma. We still haven't received testing on MSI or NGS panel back     He started palliative FOLFOX on 8/11/17. C#6 given on 10/26/17  After 6 cycles, he has stable disease    On 12/6/17, he had Diagnostic laparoscopy and Exploratory laparotomy, but HIPEC was not performed as Peritoneal cancer index was 26 with diffuse involvement of the small bowel as well as the small bowel mesentery.     He then presented to ED on 1/26 with abdominal pain and associated nausea. CT A/P on 1/26 with 5.2 x 4.5 x 3.6 cm proximal sigmoid mass causnig colonic obstriction with singificant distention of cecum (7cm in diameter), ascending colon and proximal transverse colon. No pneumatosis or pneumoperitoneum. Also  small volume of ascites with associated findings concerning for peritoneal carcinomatosis, increased from prior studies. Dr. Dudley was consulted and recommended no surgical intervention.    He was then seen in clinic on 1/29/2018 due to inability to tolerate solid foods and he was getting abdominal cramping and some nausea. He also noticed that his ostomy output decreased. He was given IV fluids as well as antibiotics and was started on OxyContin 10 mg twice a day along with p.r.n. oxycodone. Of note a few weeks prior he was started on short acting octreotide to decrease the ostomy output but he stopped taking it due to worsening of the abdominal cramping. A colonic stent was placed on 2/6/18.    He was recently hospitalized from 2/20-2/25/18 with presumed infectious colitis and bacteremia. His port was removed. He was discharged home on IV vancomycin. He resumed FOLFOX with dose reduced oxaliplatin due to previous neuropathy on 3/8/18. He was hospitalized with a bowel obstruction from 3/18-3/20/18 and a colonic stent was placed on 3/19/18. He comes in today for routine follow up.    Interim history  Patient reports increased tingling in his feet and mild hand tingling.  He reports that he had some sores on his lower lip that have since resolved.  He did not do any salt and soda swishes.  He reports that his appetite was poor up until 2 days ago.  He did not find any benefit from taking Marinol a few times.  He has noticed that his eyes have been more dry.  He reports the cycle of chemotherapy was rough with significant fatigue for about 10 days.  He spent most of his time at home sitting in his chair.  He reports he has been having more output from his ostomy.  He tried taking some peppermint oil along with Zofran and felt that that perhaps helped a little bit.  He did not try the tincture of opium.  He has continued with 500 mL of IV normal saline twice a day along with TPN for 12 hours a day.  He reports that  his abdominal pain is under pretty good control during the day, but then it he has more significant pain in the evenings.  He is averaging about 25 mg of oxycodone per day.  He denies other concerns.    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     Ulcerative colitis (H)      Past Surgical History:   Procedure Laterality Date     COLONOSCOPY  2017     COLONOSCOPY N/A 11/30/2017    Procedure: COLONOSCOPY;  Colonoscopy;  Surgeon: Rob Dudley MD;  Location: [facility]     COLONOSCOPY N/A 2/6/2018    Procedure: COMBINED COLONOSCOPY, STENT PLACEMENT;  COMBINED COLONOSCOPY with Colonic Stent Placement;  Surgeon: Guru Lionel Mar MD;  Location: [facility]     COLONOSCOPY N/A 3/19/2018    Procedure: COMBINED COLONOSCOPY, STENT PLACEMENT;  flexible sigmoidoscopy with stent placement and dilation;  Surgeon: Alexis Rios MD;  Location: [facility]     GI SURGERY  07/10/2017    Extensive lysis of adhesions, small bowel resection with end ileostomy.      HERNIA REPAIR       INSERT PORT VASCULAR ACCESS Right 8/10/2017    Procedure: INSERT PORT VASCULAR ACCESS;  Single Lumen Right Chest Power Port;  Surgeon: Malena Andrew PA-C;  [facility]     LAPAROSCOPY DIAGNOSTIC (GENERAL) N/A 12/6/2017    Procedure: LAPAROSCOPY DIAGNOSTIC (GENERAL);  Diagnostic Laparoscopy, Exploratory Laparotomy Anesthesia Block ;  Surgeon: Rob Dudley MD;  Location: [facility]     LAPAROTOMY EXPLORATORY N/A 12/6/2017    Procedure: LAPAROTOMY EXPLORATORY;;  Surgeon: Rob Dudley MD;  Location: [facility]     ORTHOPEDIC SURGERY Left     HIP ARTHROPLASTY     PICC INSERTION Right 02/23/2018    5Fr DL  BioFlo PICC, 44cm (3cm external) in the R basilic vein w/ tip in the SVC RA junction      Left hip replacement.     Allergies:  Allergies as of 04/19/2018 - Julius as Reviewed 04/19/2018   Allergen Reaction Noted     Liquid adhesive Rash 03/01/2018     Current Medications:  Current Outpatient Prescriptions   Medication Sig Dispense Refill     amylase-lipase-protease (CREON) 86306 UNITS CPEP Take 2 capsules (72,000 Units) by mouth 3 times daily (with meals) And 1 capsule with each snack tid. 240 capsule 3     dronabinol (MARINOL) 2.5 MG capsule 2.5 mg by mouth bid prn AC for decreased appetite. 60 capsule 3     fentaNYL (DURAGESIC) 25 mcg/hr 72 hr patch Place 1 patch onto the skin every 72 hours remove old patch. 10 patch 0     Ferrous Sulfate (IRON SUPPLEMENT PO) Take 325 mg by mouth 2 times daily (with meals)        LORazepam (ATIVAN) 0.5 MG tablet Take 1 tablet (0.5 mg) by mouth every 4 hours as needed (Anxiety, Nausea/Vomiting or Sleep) 30 tablet 5     Multiple Vitamins-Minerals (MULTIVITAMIN & MINERAL PO) Take 1 tablet by mouth daily       omeprazole (PRILOSEC) 20 MG CR capsule Take 1 capsule (20 mg) by mouth daily 30 capsule 11     ondansetron (ZOFRAN-ODT) 8 MG ODT tab Take 1 tablet (8 mg) by mouth every 8 hours as needed for nausea 60 tablet 5     oxyCODONE (OXYCONTIN) 10 MG 12 hr tablet Take 2 tablets (20 mg) by mouth every 12 hours 120 tablet 0     oxyCODONE IR (ROXICODONE) 10 MG tablet Take 0.5-1 tablets (5-10 mg) by mouth every 4 hours as needed for moderate to severe pain 90 tablet 0     parenteral nutrition - PTA/DISCHARGE ORDER Inject into the vein daily See medication history note from 2/20/18 for most recent TPN formula.       Psyllium (METAMUCIL FIBER) 51.7 % PACK Take 1 packet by mouth 3 times daily 90 each 3     fentaNYL (DURAGESIC) 12 mcg/hr 72 hr patch Place 1 patch onto the skin every 72 hours remove old patch. 10 patch 0     naloxone (NARCAN) nasal spray Spray 1 spray (4 mg) into one nostril  "alternating nostrils as needed for opioid reversal every 2-3 minutes until assistance arrives (Patient not taking: Reported on 4/5/2018) 0.2 mL 0     oxyCODONE (OXY-IR) 5 MG capsule Take 1-2 capsules (5-10 mg) by mouth every 4 hours as needed for moderate to severe pain (Patient not taking: Reported on 4/19/2018) 100 capsule 0     Physical Exam:  General: The patient is a pleasant male in no acute distress.  /76 (BP Location: Right arm, Patient Position: Sitting, Cuff Size: Adult Regular)  Pulse 107  Temp 98.3  F (36.8  C) (Oral)  Resp 18  Ht 1.753 m (5' 9.02\")  Wt 65.2 kg (143 lb 11.2 oz)  SpO2 99%  BMI 21.21 kg/m2  Wt Readings from Last 10 Encounters:   04/19/18 65.2 kg (143 lb 11.2 oz)   04/05/18 65.3 kg (144 lb)   03/29/18 65 kg (143 lb 3.2 oz)   03/22/18 67.6 kg (149 lb)   03/19/18 72.2 kg (159 lb 3.2 oz)   03/08/18 69.2 kg (152 lb 8 oz)   03/01/18 68.5 kg (151 lb 1.6 oz)   02/25/18 69.1 kg (152 lb 6.4 oz)   02/15/18 61.6 kg (135 lb 11.2 oz)   02/09/18 64.1 kg (141 lb 4.8 oz)   HEENT: EOMI, PERRL. Sclerae are anicteric. Oral mucosa is pink and moist with no lesions or thrush.   Lymph: Neck is supple with no lymphadenopathy in the cervical or supraclavicular areas.   Heart: Regular rate and rhythm.   Lungs: Clear to auscultation bilaterally.   Abdomen: Bowel sounds present, soft, mild central abdominal tenderness with central firmness, ostomy bag is in right abdomen with liquid green stool.  Extremities: No lower extremity edema noted bilaterally.   Neuro: Cranial nerves II through XII are grossly intact.  Skin: PICC line is in place in right arm.  No rashes or lesions on exposed skin.     Laboratory/Imaging Studies   4/19/2018 10:53   Sodium 133   Potassium 3.1 (L)   Chloride 96   Carbon Dioxide 28   Urea Nitrogen 34 (H)   Creatinine 1.06   GFR Estimate 73   GFR Estimate If Black 88   Calcium 8.6   Anion Gap 9   Magnesium 2.2   Phosphorus 3.4   Albumin 3.0 (L)   Protein Total 7.8   Bilirubin Total " 0.4   Alkaline Phosphatase 189 (H)   ALT 65   AST 44   Glucose 118 (H)   WBC 6.5   Hemoglobin 10.9 (L)   Hematocrit 32.2 (L)   Platelet Count 242   RBC Count 3.82 (L)   MCV 84   MCH 28.5   MCHC 33.9   RDW 16.4 (H)   Diff Method Automated Method   % Neutrophils 47.7   % Lymphocytes 29.7   % Monocytes 15.4   % Eosinophils 6.2   % Basophils 0.8   % Immature Granulocytes 0.2   Nucleated RBCs 0   Absolute Neutrophil 3.1   Absolute Lymphocytes 1.9   Absolute Monocytes 1.0   Absolute Eosinophils 0.4   Absolute Basophils 0.1   Abs Immature Granulocytes 0.0   Absolute Nucleated RBC 0.0     NGS PANEL COLORECTAL    No mutations were identified in analyzed regions of KRAS, NRAS, BRAF, HRAS, or PIK3CA.     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  The sigmoid carcinoma is obstructing, requiring exploratory laparotomy and end ileostomy along with small bowel resection.    MSI intact and NGS panel does not reveal any identifiable mutations   He has been started on FOLFOX palliative chemotherapy. After 6 cycles, he had stable disease  On 12/6/17, he had Diagnostic laparoscopy and Exploratory laparotomy, but HIPEC was not performed as Peritoneal cancer index was 26 with diffuse involvement of the small bowel as well as the small bowel mesentery.   Repeat CT scan showed worsening peritoneal carcinomatosis and he is getting more symptomatic in terms of worsening abdominal pain and difficulty eating because of worsening abdominal cramping. He was admitted to the hospital with worsening colon distention and colon stent was placed which improved his symptoms. Now he is doing much better.   Plan was to resume FOLFOX, but then he was hospitalized with infectious colitis and bacteremia. He recovered well from this and resumed FOLFOX on 3/8/18. Because of previous neuropathy, the dose of oxaliplatin was decreased to 70 mg/m . We may consider adding Avatin or anti-EGFR therapy in the future as  well.     He was tolerating FOLFOX fairly well. However, cycle 3 was worse than the previous two due to fatigue, worsening neuropathy, and increasing ostomy output. He does not wish to proceed with chemotherapy today. We discussed the potential of decreasing the oxaliplatin again and 5-FU doses versus stopping the oxaliplatin all together versus switching to 5-FU and irinotecan. Patient prefers to have 2 weeks off, as he felt he has not been having a good quality of life, which is important to him. He will see Dr. Long in 2 weeks with a CT CAP. He will call sooner for concerns.     Abdominal pain due to peritoneal carcinomatosis and colon obstruction. This is much better after placement of another colonic stent. Pain was initially worse with switching from OxyContin 20 mg bid to Fentanyl 12 mcg/hr. The increase to Fentanyl 25 mcg/hr on 4/3/18 has significantly helped his pain. He will continue with oxycodone prn, currently 10 mg bid and 5 mg qday. Oxycodone was refilled today.     FEN. Patient remains on 12 hours TPN/day and is eating as tolerated. His weight is fairly stable. He did not find a benefit from 2.5 mg Marinol bid. Will try increasing to 5 mg bid.   Hyponatremia. Resolved with daily IV fluids at home.   Hypokalemia. Suspect secondary to increased stool output. Will start liquid potassium 20 mEq daily x 1 week. Will recheck at next visit.   Renal insufficiency. Improving. Anticipate further improvement with break in chemotherapy. He will continue with daily IVF at home.     History of iron deficient anemia. Patient received IV iron in the August 2018. Hemoglobin has stabilized. Iron studies in early March 2018 show replete iron stores. He will continue on oral iron.    High output ostomy. Again discussed okay to resume tincture of opium. Will need to use cautiously, given history of bowel obstruction.    Dry eyes. Discussed trying Systane or Refresh lubricating eye drops.      Peripheral neuropathy.  Worsening. Secondary to oxaliplatin. Patient requests referral for acupuncture, which was placed.     Linda Alves PA-C  [facility]  [address], [place] [address] [phone]

## 2018-04-20 NOTE — PROGRESS NOTES
Form Request Documentation    Date Received in Clinic:  4/18/18  Name/Type of Form: [employer]  Questions that need to be addressed:   Current Employment Status: not currently working, no anticipated return to work   Amount of Leave Requested: Continuous Leave    Other: None  Date Completed: 4/20/2018  Copy Mailed to patient: Yes on 4/20/2018  Disposition of Form: Fax to [employer] at [phone]

## 2018-05-02 NOTE — PROGRESS NOTES
This is a recent snapshot of the patient's [facility] medical record.  For current drug dose and complete information and questions, call [phone] or In [facility] pool, [facility]v home infusion ([id number])  CSN Number:  [id number]

## 2018-05-03 NOTE — NURSING NOTE
Chief Complaint   Patient presents with     Blood Draw     JIC tubes drawn from PICC line - messaged Blaes for orders. Line flushed with saline.  Vs need to be taken when pt arrives for clinic appt.      Labs collected from PICC.  Line flushed with saline. Messaged Blaes for orders. Also needs dressing change today in clinic along with vs.    Cayla Martell RN

## 2018-05-03 NOTE — MR AVS SNAPSHOT
After Visit Summary   5/3/2018    Sebas Lopez    MRN: [id number]           Patient Information     Date Of Birth          1965        Visit Information        Provider Department      5/3/2018 1:30 PM Albert Long MD [facility]        Today's Diagnoses     Colon cancer (H)        Cancer of sigmoid colon (H)        On total parenteral nutrition (TPN)        Peritoneal carcinomatosis (H)          Care Instructions    CHemo tomorrow   refilled Lorazepam   See Linda in 2 weeks with repeat labs and chemo              Follow-ups after your visit        Your next 10 appointments already scheduled     May 04, 2018 10:00 AM CDT   Infusion 240 with [facility], [facility] 17 ATC   [facility] ([facility])    [address]  [address]  [place] [id number] [phone]            May 17, 2018  2:15 PM CDT   (Arrive by 2:00 PM)   Return Visit with Sukhdeep Mota MD   [facility] ([facility])    [address]  [address]  [place] [address] [phone]            Jun 06, 2018  3:00 PM CDT   (Arrive by 2:45 PM)   New Patient Visit with Ihsa Byers   [facility] ([facility])    [address] [place] [address]  [address]th Floor  [place] [address] [phone]              Who to contact     If you have questions or need follow up information about today's clinic visit or your schedule please contact [facility] directly at [phone].  Normal or non-critical lab and imaging results will be communicated to you by MyChart, letter or phone within 4 business days after the clinic has received the results. If you do not hear from us within 7 days, please contact the clinic through MyChart or phone. If you have a critical or abnormal lab result, we will notify you by phone as soon as possible.  Submit refill requests through  "MyChart or call your pharmacy and they will forward the refill request to us. Please allow 3 business days for your refill to be completed.          Additional Information About Your Visit        MyChart Information     [employer] gives you secure access to your electronic health record. If you see a primary care provider, you can also send messages to your care team and make appointments. If you have questions, please call your primary care clinic.  If you do not have a primary care provider, please call [phone] and they will assist you.        Care EveryWhere ID     This is your Care EveryWhere ID. This could be used by other organizations to access your [facility] medical records  [id number]        Your Vitals Were     Pulse Temperature Height BMI (Body Mass Index)          95 98.5  F (36.9  C) (Oral) 1.753 m (5' 9.02\") 21.99 kg/m2         Blood Pressure from Last 3 Encounters:   05/03/18 105/71   04/19/18 108/76   04/05/18 110/81    Weight from Last 3 Encounters:   05/03/18 67.6 kg (149 lb)   04/19/18 65.2 kg (143 lb 11.2 oz)   04/05/18 65.3 kg (144 lb)              We Performed the Following     Comprehensive metabolic panel     Magnesium     Phosphorus          Where to get your medicines      Some of these will need a paper prescription and others can be bought over the counter.  Ask your nurse if you have questions.     Bring a paper prescription for each of these medications     LORazepam 0.5 MG tablet          Primary Care Provider Office Phone # Fax #    Jaguar Becker [phone]       [place] [address] [facility] [address]        Equal Access to Services     [facility]: Hadii aad ku hadasho Soshu, waaxda luqadaha, qaybta kaalmabhargav thorpe . So [facility] [phone].    ATENCIÓN: Si habla español, tiene a cid disposición servicios gratuitos de asistencia lingüística. Llame al [phone].    We comply with applicable federal " civil rights laws and [place] laws. We do not discriminate on the basis of race, color, national origin, age, disability, sex, sexual orientation, or gender identity.            Thank you!     Thank you for choosing [facility]  for your care. Our goal is always to provide you with excellent care. Hearing back from our patients is one way we can continue to improve our services. Please take a few minutes to complete the written survey that you may receive in the mail after your visit with us. Thank you!             Your Updated Medication List - Protect others around you: Learn how to safely use, store and throw away your medicines at www.disposemymeds.org.          This list is accurate as of 5/3/18  2:18 PM.  Always use your most recent med list.                   Brand Name Dispense Instructions for use Diagnosis    amylase-lipase-protease 92886 units Cpep    CREON    240 capsule    Take 2 capsules (72,000 Units) by mouth 3 times daily (with meals) And 1 capsule with each snack tid.    Diarrhea, unspecified type, Peritoneal carcinomatosis (H), Cancer of sigmoid colon (H)       dronabinol 5 MG capsule    MARINOL    60 capsule    Take 1 capsule (5 mg) by mouth 2 times daily (before meals)    Peritoneal carcinomatosis (H), Anorexia       fentaNYL 25 mcg/hr 72 hr patch    DURAGESIC    10 patch    Place 1 patch onto the skin every 72 hours remove old patch.    Cancer of sigmoid colon (H)       IRON SUPPLEMENT PO      Take 325 mg by mouth 2 times daily (with meals)        LORazepam 0.5 MG tablet    ATIVAN    30 tablet    Take 1 tablet (0.5 mg) by mouth every 4 hours as needed (Anxiety, Nausea/Vomiting or Sleep)    Peritoneal carcinomatosis (H), Cancer of sigmoid colon (H)       MULTIVITAMIN & MINERAL PO      Take 1 tablet by mouth daily        naloxone nasal spray    NARCAN    0.2 mL    Spray 1 spray (4 mg) into one nostril alternating nostrils as needed for opioid reversal every 2-3 minutes until  assistance arrives    Cancer of sigmoid colon (H), Long term current use of opiate analgesic       omeprazole 20 MG CR capsule    priLOSEC    30 capsule    Take 1 capsule (20 mg) by mouth daily    Diarrhea, unspecified type       ondansetron 8 MG ODT tab    ZOFRAN-ODT    60 tablet    Take 1 tablet (8 mg) by mouth every 8 hours as needed for nausea    Nausea       * oxyCODONE 10 MG 12 hr tablet    OXYCONTIN    120 tablet    Take 2 tablets (20 mg) by mouth every 12 hours    Peritoneal carcinomatosis (H), Cancer of sigmoid colon (H)       * oxyCODONE IR 10 MG tablet    ROXICODONE    90 tablet    Take 0.5-1 tablets (5-10 mg) by mouth every 4 hours as needed for moderate to severe pain    Cancer of sigmoid colon (H), Peritoneal carcinomatosis (H)       parenteral nutrition - PTA/DISCHARGE ORDER      Inject into the vein daily See medication history note from 2/20/18 for most recent TPN formula.        Potassium Chloride 40 MEQ/15ML (20%) Soln     52.5 mL    Take 7.5 mLs (20 mEq) by mouth daily        Psyllium 51.7 % Pack    METAMUCIL FIBER    90 each    Take 1 packet by mouth 3 times daily    Diarrhea, unspecified type       * Notice:  This list has 2 medication(s) that are the same as other medications prescribed for you. Read the directions carefully, and ask your doctor or other care provider to review them with you.

## 2018-05-03 NOTE — NURSING NOTE
I was asked to changed pts. PICC dressing.  Site is clean and with no redness or oozing.   Cleaned and new clean dressing applied.    Gretchen Caldwell MA

## 2018-05-03 NOTE — PROGRESS NOTES
Oncology follow up visit:  Date on this visit: 5/3/2018         CC  sigmoid adenocarcinoma with peritoneal carcinomatosis- MSI- Intact KRAS wild type    Primary Physician: Waylon Han     History Of Present Illness:     Please see previous note for details. I have copied and updated from prior notes.   Sebas is a 52-year-old male who has a history of ulcerative colitis diagnosed more than 20 years ago, but he has not had medical management for that.  He was doing well, but in 05/2017 he presented with a few weeks of abdominal pain.  At that time, his imaging showed that he had a sigmoid wall thickening with adjacent mesenteric lymphadenopathy and free fluid near the abdominal wall mesh from his prior hernia surgery.  He subsequently underwent a colonoscopy which was incomplete and showed an obstructing sigmoid colon mass.  The biopsy from the sigmoid mass showed sigmoid adenocarcinoma.  He then developed obstructive symptoms and underwent exploratory laparotomy on 07/10/2017.  During that he was found to have diffuse peritoneal carcinomatosis.  Extensive lysis of adhesions was performed and a small bowel resection was performed with end ileostomy.  The biopsies from the multiple large peritoneal metastasis also were positive for metastatic adenocarcinoma.      He started palliative FOLFOX on 8/11/17. C#6 given on 10/26/17  After 6 cycles, he has stable disease    On 12/6/17, he had Diagnostic laparoscopy and Exploratory laparotomy, but HIPEC was not performed as Peritoneal cancer index was 26 with diffuse involvement of the small bowel as well as the small bowel mesentery.     He then presented to ED on 1/26 with abdominal pain and associated nausea. CT A/P on 1/26 with 5.2 x 4.5 x 3.6 cm proximal sigmoid mass causnig colonic obstriction with singificant distention of cecum (7cm in diameter), ascending colon and proximal transverse colon. No pneumatosis or pneumoperitoneum. Also small volume of ascites with  associated findings concerning for peritoneal carcinomatosis, increased from prior studies. Dr. Dudley was consulted and recommended no surgical intervention.    He was then seen in clinic on 1/29/2018 as well as yesterday because for the last few days he was unable to tolerate solid foods and he was getting abdominal cramping and some nausea. He also noticed that his ostomy output decreased. He was given IV fluids as well as antibiotics and yesterday he was started on OxyContin 10 mg twice a day along with p.r.n. oxycodone. Of note a few weeks ago he was started on short acting octreotide to decrease the ostomy output but he stopped taking it about one week ago and few days prior to that he noticed worsening of the abdominal cramping.      Repeat CT scan showed worsening peritoneal carcinomatosis and he is getting more symptomatic in terms of worsening abdominal pain and difficulty eating because of worsening abdominal cramping. He was admitted to the hospital with worsening colon distention and colon stent was placed which improved his symptoms.     He was recently hospitalized from 2/20-2/25/18 with presumed infectious colitis and bacteremia. His port was removed. He was discharged home on IV vancomycin via a PICC line which he completed on 3/7/2018.     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 He was hospitalized with a bowel obstruction from 3/18-3/20/18 and a colonic stent was placed on 3/19/18.  C#8 3/22/18 FOLFOX  C#9 4/5/18 FOLFOX    He had significant fatigue after the last cycle of chemotherapy and he also noticed more ostomy output. He was told to start using tincture of opium. He was getting 500 cc of normal saline twice a day along with TPN.  Cycle #4 which was due on 4/19/2018 and was not given as he wanted to take a break from chemotherapy.    He had restaging scans today which showed some improvement in peritoneal carcinomatosis.      Interim  history  He is doing better now. Abdominal pain is under decent control. He is taking fentanyl patch and usually takes 20-30 mg of oxycodone in addition to that. With that his pain is under good control. His appetite is better and he is able to eat and drink well. Denies nausea or vomiting. He is using tincture of opium to control the ostomy output and it is somewhat better. He continues to get 500 cc of normal saline twice a day and with that he is able to maintain his hydration. He is getting TPN for 12 hours a day. His weight has increased over the last couple of weeks. Denies infections. No trouble breathing. Currently he does not have any neuropathy although with the last chemotherapy in noticed cold sensitivity for about a couple of weeks. He is feeling much more energetic now. Denies new swellings. No bleeding.    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     Ulcerative colitis (H)      Past Surgical History:   Procedure Laterality Date     COLONOSCOPY  2017     COLONOSCOPY N/A 11/30/2017    Procedure: COLONOSCOPY;  Colonoscopy;  Surgeon: Rob Dudley MD;  Location: [facility]     COLONOSCOPY N/A 2/6/2018    Procedure: COMBINED COLONOSCOPY, STENT PLACEMENT;  COMBINED COLONOSCOPY with Colonic Stent Placement;  Surgeon: Guru Lionel Mar MD;  Location: [facility]     COLONOSCOPY N/A 3/19/2018    Procedure: COMBINED COLONOSCOPY, STENT PLACEMENT;  flexible sigmoidoscopy with stent placement and dilation;  Surgeon: Alexis Rios MD;  Location: [facility]     GI SURGERY   07/10/2017    Extensive lysis of adhesions, small bowel resection with end ileostomy.      HERNIA REPAIR       INSERT PORT VASCULAR ACCESS Right 8/10/2017    Procedure: INSERT PORT VASCULAR ACCESS;  Single Lumen Right Chest Power Port;  Surgeon: Malena Andrew PA-C;  Location: [facility]     LAPAROSCOPY DIAGNOSTIC (GENERAL) N/A 12/6/2017    Procedure: LAPAROSCOPY DIAGNOSTIC (GENERAL);  Diagnostic Laparoscopy, Exploratory Laparotomy Anesthesia Block ;  Surgeon: Rob Dudley MD;  Location: [facility]     LAPAROTOMY EXPLORATORY N/A 12/6/2017    Procedure: LAPAROTOMY EXPLORATORY;;  Surgeon: Rob Dudley MD;  Location: [facility]     ORTHOPEDIC SURGERY Left     HIP ARTHROPLASTY     PICC INSERTION Right 02/23/2018    5Fr DL BioFlo PICC, 44cm (3cm external) in the R basilic vein w/ tip in the SVC RA junction      Ulcerative colitis.  Left hip replacement.       Cancer History:   As above    Allergies:  Allergies as of 05/03/2018 - Julius as Reviewed 05/03/2018   Allergen Reaction Noted     Liquid adhesive Rash 03/01/2018     Current Medications:  Current Outpatient Prescriptions   Medication Sig Dispense Refill     amylase-lipase-protease (CREON) 33483 UNITS CPEP Take 2 capsules (72,000 Units) by mouth 3 times daily (with meals) And 1 capsule with each snack tid. 240 capsule 3     dronabinol (MARINOL) 5 MG capsule Take 1 capsule (5 mg) by mouth 2 times daily (before meals) 60 capsule 0     fentaNYL (DURAGESIC) 25 mcg/hr 72 hr patch Place 1 patch onto the skin every 72 hours remove old patch. 10 patch 0     Ferrous Sulfate (IRON SUPPLEMENT PO) Take 325 mg by mouth 2 times daily (with meals)        LORazepam (ATIVAN) 0.5 MG tablet Take 1 tablet (0.5 mg) by mouth every 4 hours as needed (Anxiety, Nausea/Vomiting or Sleep) 30 tablet 5     Multiple Vitamins-Minerals (MULTIVITAMIN & MINERAL PO) Take 1 tablet by mouth daily       naloxone (NARCAN) nasal spray Spray 1 spray (4 mg) into one nostril alternating nostrils as  needed for opioid reversal every 2-3 minutes until assistance arrives (Patient not taking: Reported on 4/5/2018) 0.2 mL 0     omeprazole (PRILOSEC) 20 MG CR capsule Take 1 capsule (20 mg) by mouth daily 30 capsule 11     ondansetron (ZOFRAN-ODT) 8 MG ODT tab Take 1 tablet (8 mg) by mouth every 8 hours as needed for nausea 60 tablet 5     oxyCODONE (OXYCONTIN) 10 MG 12 hr tablet Take 2 tablets (20 mg) by mouth every 12 hours 120 tablet 0     oxyCODONE IR (ROXICODONE) 10 MG tablet Take 0.5-1 tablets (5-10 mg) by mouth every 4 hours as needed for moderate to severe pain 90 tablet 0     parenteral nutrition - PTA/DISCHARGE ORDER Inject into the vein daily See medication history note from 2/20/18 for most recent TPN formula.       Potassium Chloride 40 MEQ/15ML (20%) SOLN Take 7.5 mLs (20 mEq) by mouth daily 52.5 mL 0     Psyllium (METAMUCIL FIBER) 51.7 % PACK Take 1 packet by mouth 3 times daily 90 each 3      Family History:  Family History   Problem Relation Age of Onset     Hypertension Father      DIABETES Father       No family history of cancer.  He does not have any kids.       Social History:  Social History     Social History     Marital status: Single     Spouse name: N/A     Number of children: N/A     Years of education: N/A     Occupational History     Not on file.     Social History Main Topics     Smoking status: Never Smoker     Smokeless tobacco: Never Used     Alcohol use 3.0 oz/week     5 Cans of beer per week      Comment: none for last 4 months     Drug use: No     Sexual activity: Not on file     Other Topics Concern     Not on file     Social History Narrative   He does not smoke and does not drink.  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  He lives with his girlfriend, Bessie.       Physical Exam:  There were no vitals taken for this visit.  CONSTITUTIONAL: No apparent distress  EYES: PERRLA, mild pallor but no icterus  ENT/MOUTH: Ears unremarkable. No oral lesions  CVS: s1s2  normal  RESPIRATORY: Chest is clear  GI: Abdomen is is soft. Ostomy is in place. Site is clean. He has mild firmness around the left side of the ostomy and it is mildly tender to deep palpation. No organomegaly   NEURO: He is alert and oriented ×3  INTEGUMENT: no concerning his skin rashes   LYMPHATIC: no palpable lymphadenopathy  MUSCULOSKELETAL: Unremarkable. No bony tenderness.   EXTREMITIES: no pedal edema. PICC line site in the right arm is intact   PSYCH: Mentation, mood and affect are appropriate          Laboratory/Imaging Studies    Reviewed  CBC RESULTS:   Recent Labs   Lab Test  05/03/18   1027   WBC  14.4*   RBC  3.76*   HGB  10.7*   HCT  32.9*   MCV  88   MCH  28.5   MCHC  32.5   RDW  16.7*   PLT  499*     Recent Labs   Lab Test  05/03/18   1024  04/19/18   1053   NA  136  133   POTASSIUM  5.2  3.1*   CHLORIDE  105  96   CO2  18*  28   ANIONGAP  13  9   GLC  103*  118*   BUN  31*  34*   CR  0.96  1.06   ANNAMARIE  9.2  8.6     Liver Function Studies -   Recent Labs   Lab Test  05/03/18   1024   PROTTOTAL  7.9   ALBUMIN  2.7*   BILITOTAL  0.2   ALKPHOS  183*   AST  38   ALT  53           I reviewed his scans from today with the radiologist    CT chest abdomen and pelvis  5/3/2018   IMPRESSION:  1. Mild interval improvement in peritoneal carcinomatosis associated  with sigmoid adenocarcinoma.  2. Resolution of colonic dilation post revision of sigmoid stent.  3. No evidence for metastatic disease in the chest.      NGS PANEL COLORECTAL    No mutations were identified in analyzed regions of KRAS, NRAS, BRAF, HRAS, or PIK3CA.       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  The sigmoid carcinoma is obstructing, requiring exploratory laparotomy and end ileostomy along with small bowel resection.    MSI intact and NGS panel does not reveal any identifiable mutations     He has been started on FOLFOX palliative chemotherapy. After 6 cycles, he had stable  disease      On 12/6/17, he had Diagnostic laparoscopy and Exploratory laparotomy, but HIPEC was not performed as Peritoneal cancer index was 26 with diffuse involvement of the small bowel as well as the small bowel mesentery.     Repeat CT scan showed worsening peritoneal carcinomatosis and he was getting more symptomatic in terms of worsening abdominal pain and difficulty eating because of worsening abdominal cramping. He was admitted to the hospital with worsening colon distention and colon stent was placed which improved his symptoms.     He was hospitalized from 2/20-2/25/18 with presumed infectious colitis and bacteremia. His port was removed. He was discharged home on IV vancomycin via PICC line. He completed vancomycin on 3/7/2018.    He resumed FOLFOX (C#7) with dose reduced oxaliplatin (70mg/m2) due to previous neuropathy on 3/8/18. He was hospitalized with a bowel obstruction from 3/18-3/20/18 and a colonic stent was placed on 3/19/18.  C#8 3/22/18 FOLFOX  C#9 4/5/18 FOLFOX    He had significant fatigue after the last cycle of chemotherapy and he also noticed more ostomy output. He was told to start using tincture of opium. He was getting 500 cc of normal saline twice a day along with TPN.  Cycle #4 which was due on 4/19/2018 and was not given as he wanted to take a break from chemotherapy.    Repeat CT shows slightly improved disease and less ascites now.   With the last chemotherapy he had more symptoms in terms of fatigue and although now he is feeling much better going forward I would like to stop the 5-FU bolus and leucovorin and we will continue with the 5-FU infusion and oxaliplatin.    I would like to give him a couple of more months of chemotherapy before repeat imaging.       Neuropathy. He had cold sensitivity for a couple of weeks after last chemotherapy but today he denies any lingering neuropathy. We will continue to observe him without any making further changes in oxaliplatin dose.  If the  neuropathy gets worse then we will cut down on oxaliplatin dose again         Abdominal pain due to peritoneal carcinomatosis .he follows with palliative care and continues to be on a fentanyl patch and oxycodone 2-3 times a day. With that his pain is under decent control. He will continue to follow closely with palliative care.    Nutrition. This has improved. He will continue with TPN 12 hours a day and now his appetite is better and he has gained some weight.    Elevated white blood cell count. Today he has leukocytosis but no other signs of infection. We will keep a watch on this and if he develops any fevers or infection he will let us know.    Anemia. Hemoglobin is stable. He will continue with oral iron    High ostomy output. I told him that he should take tincture of opium 4 times a day instead of 3 times a day. He is going to continue with additional intravenous fluid support with 500 cc of normal saline twice a day. I advised him to keep himself well hydrated.    In 2 weeks he will be seen by nurse practitioner        I answered all of his questions to his satisfaction and he is agreeable and comfortable with the plan    Albert Long

## 2018-05-03 NOTE — DISCHARGE INSTRUCTIONS

## 2018-05-03 NOTE — LETTER
5/3/2018       RE: Sebas Lopez  [address] [place] [address]     Dear Colleague,    Thank you for referring your patient, Sebas Lopez, to the [facility]. Please see a copy of my visit note below.    Oncology follow up visit:  Date on this visit: 5/3/2018         CC  sigmoid adenocarcinoma with peritoneal carcinomatosis- MSI- Intact KRAS wild type    Primary Physician: Waylon Han     History Of Present Illness:     Please see previous note for details. I have copied and updated from prior notes.   Sebas is a 52-year-old male who has a history of ulcerative colitis diagnosed more than 20 years ago, but he has not had medical management for that.  He was doing well, but in 05/2017 he presented with a few weeks of abdominal pain.  At that time, his imaging showed that he had a sigmoid wall thickening with adjacent mesenteric lymphadenopathy and free fluid near the abdominal wall mesh from his prior hernia surgery.  He subsequently underwent a colonoscopy which was incomplete and showed an obstructing sigmoid colon mass.  The biopsy from the sigmoid mass showed sigmoid adenocarcinoma.  He then developed obstructive symptoms and underwent exploratory laparotomy on 07/10/2017.  During that he was found to have diffuse peritoneal carcinomatosis.  Extensive lysis of adhesions was performed and a small bowel resection was performed with end ileostomy.  The biopsies from the multiple large peritoneal metastasis also were positive for metastatic adenocarcinoma.      He started palliative FOLFOX on 8/11/17. C#6 given on 10/26/17  After 6 cycles, he has stable disease    On 12/6/17, he had Diagnostic laparoscopy and Exploratory laparotomy, but HIPEC was not performed as Peritoneal cancer index was 26 with diffuse involvement of the small bowel as well as the small bowel mesentery.     He then presented to ED on 1/26 with abdominal pain and associated nausea. CT A/P on 1/26 with  5.2 x 4.5 x 3.6 cm proximal sigmoid mass causnig colonic obstriction with singificant distention of cecum (7cm in diameter), ascending colon and proximal transverse colon. No pneumatosis or pneumoperitoneum. Also small volume of ascites with associated findings concerning for peritoneal carcinomatosis, increased from prior studies. Dr. Dudley was consulted and recommended no surgical intervention.    He was then seen in clinic on 1/29/2018 as well as yesterday because for the last few days he was unable to tolerate solid foods and he was getting abdominal cramping and some nausea. He also noticed that his ostomy output decreased. He was given IV fluids as well as antibiotics and yesterday he was started on OxyContin 10 mg twice a day along with p.r.n. oxycodone. Of note a few weeks ago he was started on short acting octreotide to decrease the ostomy output but he stopped taking it about one week ago and few days prior to that he noticed worsening of the abdominal cramping.      Repeat CT scan showed worsening peritoneal carcinomatosis and he is getting more symptomatic in terms of worsening abdominal pain and difficulty eating because of worsening abdominal cramping. He was admitted to the hospital with worsening colon distention and colon stent was placed which improved his symptoms.     He was recently hospitalized from 2/20-2/25/18 with presumed infectious colitis and bacteremia. His port was removed. He was discharged home on IV vancomycin via a PICC line which he completed on 3/7/2018.     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 He was hospitalized with a bowel obstruction from 3/18-3/20/18 and a colonic stent was placed on 3/19/18.  C#8 3/22/18 FOLFOX  C#9 4/5/18 FOLFOX    He had significant fatigue after the last cycle of chemotherapy and he also noticed more ostomy output. He was told to start using tincture of opium. He was getting 500 cc of normal  saline twice a day along with TPN.  Cycle #4 which was due on 4/19/2018 and was not given as he wanted to take a break from chemotherapy.    He had restaging scans today which showed some improvement in peritoneal carcinomatosis.      Interim history  He is doing better now. Abdominal pain is under decent control. He is taking fentanyl patch and usually takes 20-30 mg of oxycodone in addition to that. With that his pain is under good control. His appetite is better and he is able to eat and drink well. Denies nausea or vomiting. He is using tincture of opium to control the ostomy output and it is somewhat better. He continues to get 500 cc of normal saline twice a day and with that he is able to maintain his hydration. He is getting TPN for 12 hours a day. His weight has increased over the last couple of weeks. Denies infections. No trouble breathing. Currently he does not have any neuropathy although with the last chemotherapy in noticed cold sensitivity for about a couple of weeks. He is feeling much more energetic now. Denies new swellings. No bleeding.    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     Ulcerative colitis (H)      Past Surgical History:   Procedure Laterality Date     COLONOSCOPY  2017     COLONOSCOPY N/A 11/30/2017    Procedure: COLONOSCOPY;  Colonoscopy;  Surgeon: Rob Dudley MD;  Location:  GI     COLONOSCOPY N/A 2/6/2018    Procedure: COMBINED COLONOSCOPY, STENT PLACEMENT;  COMBINED COLONOSCOPY with Colonic Stent Placement;  Surgeon: Guru Lionel Mar  MD Efrain;  Location: [facility] OR     COLONOSCOPY N/A 3/19/2018    Procedure: COMBINED COLONOSCOPY, STENT PLACEMENT;  flexible sigmoidoscopy with stent placement and dilation;  Surgeon: Alexis Rios MD;  Location: [facility] OR     GI SURGERY  07/10/2017    Extensive lysis of adhesions, small bowel resection with end ileostomy.      HERNIA REPAIR       INSERT PORT VASCULAR ACCESS Right 8/10/2017    Procedure: INSERT PORT VASCULAR ACCESS;  Single Lumen Right Chest Power Port;  Surgeon: Malena Andrew PA-C;  [facility]     LAPAROSCOPY DIAGNOSTIC (GENERAL) N/A 12/6/2017    Procedure: LAPAROSCOPY DIAGNOSTIC (GENERAL);  Diagnostic Laparoscopy, Exploratory Laparotomy Anesthesia Block ;  Surgeon: Rob Dudley MD;  Location: [facility] OR     LAPAROTOMY EXPLORATORY N/A 12/6/2017    Procedure: LAPAROTOMY EXPLORATORY;;  Surgeon: Rob Dudley MD;  Location: [facility] OR     ORTHOPEDIC SURGERY Left     HIP ARTHROPLASTY     PICC INSERTION Right 02/23/2018    5Fr DL BioFlo PICC, 44cm (3cm external) in the R basilic vein w/ tip in the SVC RA junction      Ulcerative colitis.  Left hip replacement.       Cancer History:   As above    Allergies:  Allergies as of 05/03/2018 - Julius as Reviewed 05/03/2018   Allergen Reaction Noted     Liquid adhesive Rash 03/01/2018     Current Medications:  Current Outpatient Prescriptions   Medication Sig Dispense Refill     amylase-lipase-protease (CREON) 56923 UNITS CPEP Take 2 capsules (72,000 Units) by mouth 3 times daily (with meals) And 1 capsule with each snack tid. 240 capsule 3     dronabinol (MARINOL) 5 MG capsule Take 1 capsule (5 mg) by mouth 2 times daily (before meals) 60 capsule 0     fentaNYL (DURAGESIC) 25 mcg/hr 72 hr patch Place 1 patch onto the skin every 72 hours remove old patch. 10 patch 0     Ferrous Sulfate (IRON SUPPLEMENT PO) Take 325 mg by mouth 2 times daily (with meals)        LORazepam (ATIVAN) 0.5 MG tablet Take 1 tablet (0.5 mg) by mouth every 4 hours  as needed (Anxiety, Nausea/Vomiting or Sleep) 30 tablet 5     Multiple Vitamins-Minerals (MULTIVITAMIN & MINERAL PO) Take 1 tablet by mouth daily       naloxone (NARCAN) nasal spray Spray 1 spray (4 mg) into one nostril alternating nostrils as needed for opioid reversal every 2-3 minutes until assistance arrives (Patient not taking: Reported on 4/5/2018) 0.2 mL 0     omeprazole (PRILOSEC) 20 MG CR capsule Take 1 capsule (20 mg) by mouth daily 30 capsule 11     ondansetron (ZOFRAN-ODT) 8 MG ODT tab Take 1 tablet (8 mg) by mouth every 8 hours as needed for nausea 60 tablet 5     oxyCODONE (OXYCONTIN) 10 MG 12 hr tablet Take 2 tablets (20 mg) by mouth every 12 hours 120 tablet 0     oxyCODONE IR (ROXICODONE) 10 MG tablet Take 0.5-1 tablets (5-10 mg) by mouth every 4 hours as needed for moderate to severe pain 90 tablet 0     parenteral nutrition - PTA/DISCHARGE ORDER Inject into the vein daily See medication history note from 2/20/18 for most recent TPN formula.       Potassium Chloride 40 MEQ/15ML (20%) SOLN Take 7.5 mLs (20 mEq) by mouth daily 52.5 mL 0     Psyllium (METAMUCIL FIBER) 51.7 % PACK Take 1 packet by mouth 3 times daily 90 each 3      Family History:  Family History   Problem Relation Age of Onset     Hypertension Father      DIABETES Father       No family history of cancer.  He does not have any kids.       Social History:  Social History     Social History     Marital status: Single     Spouse name: N/A     Number of children: N/A     Years of education: N/A     Occupational History     Not on file.     Social History Main Topics     Smoking status: Never Smoker     Smokeless tobacco: Never Used     Alcohol use 3.0 oz/week     5 Cans of beer per week      Comment: none for last 4 months     Drug use: No     Sexual activity: Not on file     Other Topics Concern     Not on file     Social History Narrative   He does not smoke and does not drink.  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  He  lives with his girlfriend, Bessie.       Physical Exam:  There were no vitals taken for this visit.  CONSTITUTIONAL: No apparent distress  EYES: PERRLA, mild pallor but no icterus  ENT/MOUTH: Ears unremarkable. No oral lesions  CVS: s1s2 normal  RESPIRATORY: Chest is clear  GI: Abdomen is is soft. Ostomy is in place. Site is clean. He has mild firmness around the left side of the ostomy and it is mildly tender to deep palpation. No organomegaly   NEURO: He is alert and oriented ×3  INTEGUMENT: no concerning his skin rashes   LYMPHATIC: no palpable lymphadenopathy  MUSCULOSKELETAL: Unremarkable. No bony tenderness.   EXTREMITIES: no pedal edema. PICC line site in the right arm is intact   PSYCH: Mentation, mood and affect are appropriate          Laboratory/Imaging Studies    Reviewed  CBC RESULTS:   Recent Labs   Lab Test  05/03/18   1027   WBC  14.4*   RBC  3.76*   HGB  10.7*   HCT  32.9*   MCV  88   MCH  28.5   MCHC  32.5   RDW  16.7*   PLT  499*     Recent Labs   Lab Test  05/03/18   1024  04/19/18   1053   NA  136  133   POTASSIUM  5.2  3.1*   CHLORIDE  105  96   CO2  18*  28   ANIONGAP  13  9   GLC  103*  118*   BUN  31*  34*   CR  0.96  1.06   ANNAMARIE  9.2  8.6     Liver Function Studies -   Recent Labs   Lab Test  05/03/18   1024   PROTTOTAL  7.9   ALBUMIN  2.7*   BILITOTAL  0.2   ALKPHOS  183*   AST  38   ALT  53           I reviewed his scans from today with the radiologist    CT chest abdomen and pelvis  5/3/2018   IMPRESSION:  1. Mild interval improvement in peritoneal carcinomatosis associated  with sigmoid adenocarcinoma.  2. Resolution of colonic dilation post revision of sigmoid stent.  3. No evidence for metastatic disease in the chest.      NGS PANEL COLORECTAL    No mutations were identified in analyzed regions of KRAS, NRAS, BRAF, HRAS, or PIK3CA.       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  The sigmoid carcinoma is obstructing, requiring exploratory  laparotomy and end ileostomy along with small bowel resection.    MSI intact and NGS panel does not reveal any identifiable mutations     He has been started on FOLFOX palliative chemotherapy. After 6 cycles, he had stable disease      On 12/6/17, he had Diagnostic laparoscopy and Exploratory laparotomy, but HIPEC was not performed as Peritoneal cancer index was 26 with diffuse involvement of the small bowel as well as the small bowel mesentery.     Repeat CT scan showed worsening peritoneal carcinomatosis and he was getting more symptomatic in terms of worsening abdominal pain and difficulty eating because of worsening abdominal cramping. He was admitted to the hospital with worsening colon distention and colon stent was placed which improved his symptoms.     He was hospitalized from 2/20-2/25/18 with presumed infectious colitis and bacteremia. His port was removed. He was discharged home on IV vancomycin via PICC line. He completed vancomycin on 3/7/2018.    He resumed FOLFOX (C#7) with dose reduced oxaliplatin (70mg/m2) due to previous neuropathy on 3/8/18. He was hospitalized with a bowel obstruction from 3/18-3/20/18 and a colonic stent was placed on 3/19/18.  C#8 3/22/18 FOLFOX  C#9 4/5/18 FOLFOX    He had significant fatigue after the last cycle of chemotherapy and he also noticed more ostomy output. He was told to start using tincture of opium. He was getting 500 cc of normal saline twice a day along with TPN.  Cycle #4 which was due on 4/19/2018 and was not given as he wanted to take a break from chemotherapy.    Repeat CT shows slightly improved disease and less ascites now.   With the last chemotherapy he had more symptoms in terms of fatigue and although now he is feeling much better going forward I would like to stop the 5-FU bolus and leucovorin and we will continue with the 5-FU infusion and oxaliplatin.    I would like to give him a couple of more months of chemotherapy before repeat imaging.        Neuropathy. He had cold sensitivity for a couple of weeks after last chemotherapy but today he denies any lingering neuropathy. We will continue to observe him without any making further changes in oxaliplatin dose.  If the neuropathy gets worse then we will cut down on oxaliplatin dose again         Abdominal pain due to peritoneal carcinomatosis .he follows with palliative care and continues to be on a fentanyl patch and oxycodone 2-3 times a day. With that his pain is under decent control. He will continue to follow closely with palliative care.    Nutrition. This has improved. He will continue with TPN 12 hours a day and now his appetite is better and he has gained some weight.    Elevated white blood cell count. Today he has leukocytosis but no other signs of infection. We will keep a watch on this and if he develops any fevers or infection he will let us know.    Anemia. Hemoglobin is stable. He will continue with oral iron    High ostomy output. I told him that he should take tincture of opium 4 times a day instead of 3 times a day. He is going to continue with additional intravenous fluid support with 500 cc of normal saline twice a day. I advised him to keep himself well hydrated.    In 2 weeks he will be seen by nurse practitioner        I answered all of his questions to his satisfaction and he is agreeable and comfortable with the plan    Albert Long

## 2018-05-03 NOTE — NURSING NOTE
"Oncology Rooming Note    May 3, 2018 1:45 PM   Sebas Lopez is a 52 year old male who presents for:    Chief Complaint   Patient presents with     Blood Draw     JIC tubes drawn from PICC line - messaged Blaes for orders. Line flushed with saline.  Vs need to be taken when pt arrives for clinic appt.      Oncology Clinic Visit     return     Initial Vitals: There were no vitals taken for this visit. Estimated body mass index is 21.21 kg/(m^2) as calculated from the following:    Height as of 4/19/18: 1.753 m (5' 9.02\").    Weight as of 4/19/18: 65.2 kg (143 lb 11.2 oz). There is no height or weight on file to calculate BSA.  Data Unavailable Comment: Data Unavailable   No LMP for male patient.  Allergies reviewed: Yes  Medications reviewed: Yes    Medications: MEDICATION REFILLS NEEDED TODAY. Provider was notified.  lorazepam   Pharmacy name entered into [employer]: [facility] [place] - [place], [place] - [address] [place] [address]    Clinical concerns: none     6 minutes for nursing intake (face to face time)     Anamaria Pruitt RN              "

## 2018-05-03 NOTE — NURSING NOTE
Chief Complaint   Patient presents with     Blood Draw     JIC tubes drawn from PICC line - messaged Blaes for orders. Line flushed with saline.  Vs need to be taken when pt arrives for clinic appt.      Oncology Clinic Visit     return     Labs collected from PICC.  Line flushed with saline. Messaged Blaes for orders. Also needs dressing change today in clinic along with vs.    Cayla Martell RN

## 2018-05-03 NOTE — PROGRESS NOTES
This is a recent snapshot of the patient's [facility] medical record.  For current drug dose and complete information and questions, call [phone] or In Basket pool, [facility]v home infusion ([id number])  CSN Number:  [id number]

## 2018-05-04 NOTE — PATIENT INSTRUCTIONS
[facility] Main Line: [phone]    Call triage nurse with chills and/or temperature greater than or equal to 100.4, uncontrolled nausea/vomiting, diarrhea, constipation, dizziness, shortness of breath, chest pain, bleeding, unexplained bruising, or any new/concerning symptoms, questions/concerns.   If you are having any concerning symptoms or wish to speak to a provider before your next infusion visit, please call your care coordinator or triage to notify them so we can adequately serve you.   Triage Nurse Line: [phone]    If after hours, weekends, or holidays, call main hospital  and ask for Oncology doctor on call @ [phone]             May 2018   [place] Monday Tuesday Wednesday Thursday Friday Saturday             1     2     3     [facility] DRAW   10:15 AM   (15 min.)    [facility] DRAW   [facility] Lab Draw     CT CHEST/ABDOMEN/PELVIS W   11:00 AM   (20 min.)   [facility]   [facility] CT     [facility] RETURN    1:15 PM   (30 min.)   Albert Long MD   [facility] Cancer [facility]     LAB    4:30 PM   (15 min.)   [facility] LAB   [facility] Lab 4     [facility] ONC INFUSION 240   10:00 AM   (240 min.)   [facility] ONCOLOGY INFUSION   [facility] 5       6     7     8     9     10     11     12       13     14     15     16     17     [facility] RETURN    2:00 PM   (45 min.)   Sukhdeep Mota MD   [facility] 18     [facility] DRAW    9:00 AM   (15 min.)   [facility] DRAW   [facility] Lab Draw     [facility] RETURN    9:15 AM   (50 min.)   Linda Alves PA-C   [facility]     [facility] ONC INFUSION 240   11:30 AM   (240 min.)   [facility] ONCOLOGY INFUSION   [facility] 19       20     21     22     23     24     25     26       27     28     29     30     31 June 2018 Sunday Monday Tuesday Wednesday Thursday Friday Saturday                            1     2       3     4     5     6      [facility] NEW    2:45 PM   (60 min.)   [facility] 7     8     9       10     11     12     13     14     15  Happy Birthday!     16       17     18     19     20     21     22     23       24     25     26     27     28     29     30                  Lab Results:  No results found for this or any previous visit (from the past 12 hour(s)).

## 2018-05-04 NOTE — PROGRESS NOTES
Infusion Nursing Note:  Sebas Lopez presents today for Cycle 10 Day 1 Oxaliplatin/Fluorouracil pump.    Patient seen by provider today: No   present during visit today: Not Applicable.    Note:  Evaluated by Dr. Long yesterday in clinic.  Reports no changes over night.  Reports his usual lower abdominal pain which he rates a 3 currently.  His Fentanyl patches are helpful and he needs a refill today.  Fluorouracil bolus and Leucovorin have been discontinued for this cycle.  Sebas states he is no longer taking Octreotide (has a supportive plan for this).  I/B to Dr. Long to see if he can discontinue this plan.    Intravenous Access:  PICC.    Treatment Conditions:  Lab Results   Component Value Date    HGB 10.7 05/03/2018     Lab Results   Component Value Date    WBC 14.4 05/03/2018      Lab Results   Component Value Date    ANEU 10.9 05/03/2018     Lab Results   Component Value Date     05/03/2018      Lab Results   Component Value Date     05/03/2018                   Lab Results   Component Value Date    POTASSIUM 5.2 05/03/2018           Lab Results   Component Value Date    MAG 2.1 05/03/2018            Lab Results   Component Value Date    CR 0.96 05/03/2018                   Lab Results   Component Value Date    ANNAMARIE 9.2 05/03/2018                Lab Results   Component Value Date    BILITOTAL 0.2 05/03/2018           Lab Results   Component Value Date    ALBUMIN 2.7 05/03/2018                    Lab Results   Component Value Date    ALT 53 05/03/2018           Lab Results   Component Value Date    AST 38 05/03/2018       Results reviewed, labs MET treatment parameters, ok to proceed with treatment.      Post Infusion Assessment:  Patient tolerated infusion without incident.  Blood return noted pre and post infusion.  Site patent and intact, free from redness, edema or discomfort.  No evidence of extravasations.    PICC line - Fluorouracil C-Series pump infusing at 5.2 mlhr x 46  hours.   Connections taped.  Clamp taped to open.  Capillary element taped down to skin with tegaderm.  Pump connections double checked by Shari Andre RN.  [facility] at [facility] contacted with pump disconnect date/time (Sunday 1100).    Discharge Plan:   Prescription refills given for Fentanyl (hard copy to patient).  Copy of AVS reviewed with patient and/or family.  Patient will return 5/18/18 (labs, PA, chemo) for next appointment.  Patient discharged in stable condition accompanied by: mother.  Departure Mode: Ambulatory.  Face to Face time: 0.    Maribell Cifuentes RN

## 2018-05-04 NOTE — MR AVS SNAPSHOT
After Visit Summary   5/4/2018    Sebas Lopez    MRN: [id number]           Patient Information     Date Of Birth          1965        Visit Information        Provider Department      5/4/2018 10:00 AM  17 ATC;  ONCOLOGY INFUSION [facility]        Today's Diagnoses     Peritoneal carcinomatosis (H)    -  1    Cancer of sigmoid colon (H)          [facility] [place] Main Line: [phone]    Call triage nurse with chills and/or temperature greater than or equal to 100.4, uncontrolled nausea/vomiting, diarrhea, constipation, dizziness, shortness of breath, chest pain, bleeding, unexplained bruising, or any new/concerning symptoms, questions/concerns.   If you are having any concerning symptoms or wish to speak to a provider before your next infusion visit, please call your care coordinator or triage to notify them so we can adequately serve you.   Triage Nurse Line: [phone]    If after hours, weekends, or holidays, call main hospital  and ask for Oncology doctor on call @ [phone]             May 2018   [place] Monday Tuesday Wednesday Thursday Friday Saturday             1     2     3     [facility] DRAW   10:15 AM   (15 min.)    [facility] DRAW   [facility] Draw     CT CHEST/ABDOMEN/PELVIS W   11:00 AM   (20 min.)   UCCT1   [facility] CT     [facility] RETURN    1:15 PM   (30 min.)   Albert Long MD   [facility]     LAB    4:30 PM   (15 min.)    LAB   [facility] Lab 4     [facility] ONC INFUSION 240   10:00 AM   (240 min.)    ONCOLOGY INFUSION   [facility] 5       6     7     8     9     10     11     12       13     14     15     16     17     [facility] RETURN    2:00 PM   (45 min.)   Sukhdeep Mota MD   [facility] 18     [facility] DRAW    9:00 AM   (15 min.)    [facility] DRAW   [facility] Draw     [facility] RETURN    9:15 AM   (50  min.Linda Del Toro PA-C   [facility]     [facility] ONC INFUSION 240   11:30 AM   (240 min.)   UC ONCOLOGY INFUSION   [facility] 19       20     21     22     23     24     25     26       27     28     29     30     31 June 2018 Sunday Monday Tuesday Wednesday Thursday Friday Saturday                            1     2       3     4     5     6     [facility] NEW    2:45 PM   (60 min.)   Degen, Isha   [facility] 7     8     9       10     11     12     13     14     15  Happy Birthday!     16       17     18     19     20     21     22     23       24     25     26     27     28     29     30                  Lab Results:  No results found for this or any previous visit (from the past 12 hour(s)).          Follow-ups after your visit        Your next 10 appointments already scheduled     May 17, 2018  2:15 PM CDT   (Arrive by 2:00 PM)   Return Visit with Sukhdeep Mota MD   [facility] ([facility])    [address] [place] [address]  [address]  [facility] [address]   [phone]            May 18, 2018  9:00 AM CDT   [facility] Lab Draw with  [facility] DRAW   [facility] Draw ([facility])    [address] [place] [address]  [address]  [facility] [address]   [phone]            May 18, 2018  9:30 AM CDT   (Arrive by 9:15 AM)   Return Visit with Linda Alves PA-C   [facility] ([facility])    [address] [place] [address]  [address]  [facility] [address]   [phone]            May 18, 2018 11:30 AM CDT   Infusion 240 with UC ONCOLOGY INFUSION, UC 20 ATC   [facility] ([facility])    [address] [place] [address]  [address]  [facility] [address]   [phone]            Jun 06, 2018  3:00 PM CDT   (Arrive by 2:45 PM)   New Patient Visit with Isha FRANCIS [facility] (  [facility])    [address]  5th Floor  [facility] [address] [phone]              Who to contact     If you have questions or need follow up information about today's clinic visit or your schedule please contact [facility] directly at [phone].  Normal or non-critical lab and imaging results will be communicated to you by MyChart, letter or phone within 4 business days after the clinic has received the results. If you do not hear from us within 7 days, please contact the clinic through [employer] or phone. If you have a critical or abnormal lab result, we will notify you by phone as soon as possible.  Submit refill requests through [employer] or call your pharmacy and they will forward the refill request to us. Please allow 3 business days for your refill to be completed.          Additional Information About Your Visit        [employer] Information     [employer] gives you secure access to your electronic health record. If you see a primary care provider, you can also send messages to your care team and make appointments. If you have questions, please call your primary care clinic.  If you do not have a primary care provider, please call [phone] and they will assist you.        Care EveryWhere ID     This is your Care EveryWhere ID. This could be used by other organizations to access your [place] medical records  [id number]        Your Vitals Were     Pulse Temperature Respirations Pulse Oximetry          99 97.7  F (36.5  C) (Oral) 16 98%         Blood Pressure from Last 3 Encounters:   05/04/18 110/71   05/03/18 105/71   04/19/18 108/76    Weight from Last 3 Encounters:   05/03/18 67.6 kg (149 lb)   04/19/18 65.2 kg (143 lb 11.2 oz)   04/05/18 65.3 kg (144 lb)              Today, you had the following     No orders found for display         Where to get your medicines      Some of these will need a paper prescription and others can be bought over the  counter.  Ask your nurse if you have questions.     Bring a paper prescription for each of these medications     fentaNYL 25 mcg/hr 72 hr patch         Information about OPIOIDS     PRESCRIPTION OPIOIDS: WHAT YOU NEED TO KNOW   You have a prescription for an opioid (narcotic) pain medicine. Opioids can cause addiction. If you have a history of chemical dependency of any type, you are at a higher risk of becoming addicted to opioids. Only take this medicine after all other options have been tried. Take it for as short a time and as few doses as possible.     Do not:    Drive. If you drive while taking these medicines, you could be arrested for driving under the influence (DUI).    Operate heavy machinery    Do any other dangerous activities while taking these medicines.     Drink any alcohol while taking these medicines.      Take with any other medicines that contain acetaminophen. Read all labels carefully. Look for the word  acetaminophen  or  Tylenol.  Ask your pharmacist if you have questions or are unsure.    Store your pills in a secure place, locked if possible. We will not replace any lost or stolen medicine. If you don t finish your medicine, please throw away (dispose) as directed by your pharmacist. The [place] Pollution Control Agency has more information about safe disposal: https://www.pca.[facility].mn.us/living-green/managing-unwanted-medications    All opioids tend to cause constipation. Drink plenty of water and eat foods that have a lot of fiber, such as fruits, vegetables, prune juice, apple juice and high-fiber cereal. Take a laxative (Miralax, milk of magnesia, Colace, Senna) if you don t move your bowels at least every other day.          Primary Care Provider Office Phone # Fax #    Jaguar Becker [phone]       [place] [facility] [address]        Equal Access to Services     SARAH DUNN AH: mario Taylor qaybta  bhargav montesbenita sage ahCat Reddy [facility] [phone].    ATENCIÓN: Si maikel rios, tiene a cid disposición servicios gratuitos de asistencia lingüística. Kenan al [phone].    We comply with applicable federal civil rights laws and [place] laws. We do not discriminate on the basis of race, color, national origin, age, disability, sex, sexual orientation, or gender identity.            Thank you!     Thank you for choosing [facility]  for your care. Our goal is always to provide you with excellent care. Hearing back from our patients is one way we can continue to improve our services. Please take a few minutes to complete the written survey that you may receive in the mail after your visit with us. Thank you!             Your Updated Medication List - Protect others around you: Learn how to safely use, store and throw away your medicines at www.disposemymeds.org.          This list is accurate as of 5/4/18 11:43 AM.  Always use your most recent med list.                   Brand Name Dispense Instructions for use Diagnosis    amylase-lipase-protease 92293 units Cpep    CREON    240 capsule    Take 2 capsules (72,000 Units) by mouth 3 times daily (with meals) And 1 capsule with each snack tid.    Diarrhea, unspecified type, Peritoneal carcinomatosis (H), Cancer of sigmoid colon (H)       dronabinol 5 MG capsule    MARINOL    60 capsule    Take 1 capsule (5 mg) by mouth 2 times daily (before meals)    Peritoneal carcinomatosis (H), Anorexia       fentaNYL 25 mcg/hr 72 hr patch    DURAGESIC    10 patch    Place 1 patch onto the skin every 72 hours remove old patch.    Cancer of sigmoid colon (H)       IRON SUPPLEMENT PO      Take 325 mg by mouth 2 times daily (with meals)        LORazepam 0.5 MG tablet    ATIVAN    30 tablet    Take 1 tablet (0.5 mg) by mouth every 4 hours as needed (Anxiety, Nausea/Vomiting or Sleep)    Peritoneal carcinomatosis (H), Cancer of  sigmoid colon (H)       MULTIVITAMIN & MINERAL PO      Take 1 tablet by mouth daily        naloxone nasal spray    NARCAN    0.2 mL    Spray 1 spray (4 mg) into one nostril alternating nostrils as needed for opioid reversal every 2-3 minutes until assistance arrives    Cancer of sigmoid colon (H), Long term current use of opiate analgesic       omeprazole 20 MG CR capsule    priLOSEC    30 capsule    Take 1 capsule (20 mg) by mouth daily    Diarrhea, unspecified type       ondansetron 8 MG ODT tab    ZOFRAN-ODT    60 tablet    Take 1 tablet (8 mg) by mouth every 8 hours as needed for nausea    Nausea       * oxyCODONE 10 MG 12 hr tablet    OXYCONTIN    120 tablet    Take 2 tablets (20 mg) by mouth every 12 hours    Peritoneal carcinomatosis (H), Cancer of sigmoid colon (H)       * oxyCODONE IR 10 MG tablet    ROXICODONE    90 tablet    Take 0.5-1 tablets (5-10 mg) by mouth every 4 hours as needed for moderate to severe pain    Cancer of sigmoid colon (H), Peritoneal carcinomatosis (H)       parenteral nutrition - PTA/DISCHARGE ORDER      Inject into the vein daily See medication history note from 2/20/18 for most recent TPN formula.        Potassium Chloride 40 MEQ/15ML (20%) Soln     52.5 mL    Take 7.5 mLs (20 mEq) by mouth daily        Psyllium 51.7 % Pack    METAMUCIL FIBER    90 each    Take 1 packet by mouth 3 times daily    Diarrhea, unspecified type       * Notice:  This list has 2 medication(s) that are the same as other medications prescribed for you. Read the directions carefully, and ask your doctor or other care provider to review them with you.

## 2018-05-07 NOTE — PROGRESS NOTES
This is a recent snapshot of the patient's [facility] medical record.  For current drug dose and complete information and questions, call [phone] or In Basket pool, fv home infusion ([id number])  CSN Number:  [id number]

## 2018-05-10 NOTE — PROGRESS NOTES
This is a recent snapshot of the patient's [facility] medical record.  For current drug dose and complete information and questions, call [phone] or In Basket pool, fv home infusion ([id number])  CSN Number:  [id number]

## 2018-05-14 NOTE — PROGRESS NOTES
This is a recent snapshot of the patient's [facility] medical record.  For current drug dose and complete information and questions, call [phone] or In Basket pool, fv home infusion ([id number])  CSN Number:  [id number]

## 2018-05-15 NOTE — PROGRESS NOTES
This is a recent snapshot of the patient's [facility] medical record.  For current drug dose and complete information and questions, call [phone] or In Basket pool, fv home infusion ([id number])  CSN Number:  [id number]

## 2018-05-17 NOTE — PROGRESS NOTES
This is a recent snapshot of the patient's [facility] medical record.  For current drug dose and complete information and questions, call [phone] or In Basket pool, fv home infusion ([id number])  CSN Number:  [id number]

## 2018-05-18 NOTE — NURSING NOTE
"Oncology Rooming Note    May 18, 2018 9:42 AM   Sebas Lopez is a 52 year old male who presents for:    Chief Complaint   Patient presents with     Oncology Clinic Visit     Return visit related to Colon Cancer     Initial Vitals: /73 (BP Location: Left arm, Patient Position: Sitting, Cuff Size: Adult Regular)  Pulse 105  Temp 99.4  F (37.4  C) (Oral)  Resp 16  Ht 1.753 m (5' 9.02\")  Wt 68 kg (150 lb)  SpO2 99%  BMI 22.14 kg/m2 Estimated body mass index is 22.14 kg/(m^2) as calculated from the following:    Height as of this encounter: 1.753 m (5' 9.02\").    Weight as of this encounter: 68 kg (150 lb). Body surface area is 1.82 meters squared.  No Pain (0) Comment: Data Unavailable   No LMP for male patient.  Allergies reviewed: Yes  Medications reviewed: Yes    Medications: MEDICATION REFILLS NEEDED TODAY. Provider was notified.  Pharmacy name entered into [facility]: [facility] [place] - [place], [place] - [address] [place] [address]    Clinical concerns: Refills needed. Provider was notified.    10 minutes for nursing intake (face to face time)     Gina Gutierrez LPN            "

## 2018-05-18 NOTE — Clinical Note
5/18/2018       RE: Sebas Lopez  [address] [place] [address]     Dear Colleague,    Thank you for referring your patient, Sebas Lopez, to the [facility]. Please see a copy of my visit note below.    Oncology follow up visit:  Date on this visit: May 18, 2018    CC  sigmoid adenocarcinoma with peritoneal carcinomatosis    Primary Physician: Waylon Han     History Of Present Illness:     Please see previous note for details. I have copied and updated from prior notes.   Sebas is a 52 year old male who has a history of ulcerative colitis diagnosed more than 20 years ago, but he has not had medical management for that.  He was doing well, but in 05/2017 he presented with a few weeks of abdominal pain.  At that time, his imaging showed that he had a sigmoid wall thickening with adjacent mesenteric lymphadenopathy and free fluid near the abdominal wall mesh from his prior hernia surgery.  He subsequently underwent a colonoscopy which was incomplete and showed an obstructing sigmoid colon mass.  The biopsy from the sigmoid mass showed sigmoid adenocarcinoma.  He then developed obstructive symptoms and underwent exploratory laparotomy on 07/10/2017.  During that he was found to have diffuse peritoneal carcinomatosis.  Extensive lysis of adhesions was performed and a small bowel resection was performed with end ileostomy.  The biopsies from the multiple large peritoneal metastasis also were positive for metastatic adenocarcinoma. We still haven't received testing on MSI or NGS panel back     He started palliative FOLFOX on 8/11/17. C#6 given on 10/26/17  After 6 cycles, he has stable disease    On 12/6/17, he had Diagnostic laparoscopy and Exploratory laparotomy, but HIPEC was not performed as Peritoneal cancer index was 26 with diffuse involvement of the small bowel as well as the small bowel mesentery.     He then presented to ED on 1/26 with abdominal pain and  associated nausea. CT A/P on 1/26 with 5.2 x 4.5 x 3.6 cm proximal sigmoid mass causnig colonic obstriction with singificant distention of cecum (7cm in diameter), ascending colon and proximal transverse colon. No pneumatosis or pneumoperitoneum. Also small volume of ascites with associated findings concerning for peritoneal carcinomatosis, increased from prior studies. Dr. Dudley was consulted and recommended no surgical intervention.    He was then seen in clinic on 1/29/2018 due to inability to tolerate solid foods and he was getting abdominal cramping and some nausea. He also noticed that his ostomy output decreased. He was given IV fluids as well as antibiotics and was started on OxyContin 10 mg twice a day along with p.r.n. oxycodone. Of note a few weeks prior he was started on short acting octreotide to decrease the ostomy output but he stopped taking it due to worsening of the abdominal cramping. A colonic stent was placed on 2/6/18.    He was recently hospitalized from 2/20-2/25/18 with presumed infectious colitis and bacteremia. His port was removed. He was discharged home on IV vancomycin. He resumed FOLFOX with dose reduced oxaliplatin due to previous neuropathy on 3/8/18. He was hospitalized with a bowel obstruction from 3/18-3/20/18 and a colonic stent was placed on 3/19/18. He comes in today for routine follow up.    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     Ulcerative colitis (H)      Past Surgical History:   Procedure Laterality Date     COLONOSCOPY  2017     COLONOSCOPY N/A 11/30/2017    Procedure: COLONOSCOPY;  Colonoscopy;  Surgeon: Rob Dudley  MD;  Location: [facility]     COLONOSCOPY N/A 2/6/2018    Procedure: COMBINED COLONOSCOPY, STENT PLACEMENT;  COMBINED COLONOSCOPY with Colonic Stent Placement;  Surgeon: Guru Lionel Mar MD;  Location: [facility]     COLONOSCOPY N/A 3/19/2018    Procedure: COMBINED COLONOSCOPY, STENT PLACEMENT;  flexible sigmoidoscopy with stent placement and dilation;  Surgeon: Alexis Rios MD;  Location: [facility]     GI SURGERY  07/10/2017    Extensive lysis of adhesions, small bowel resection with end ileostomy.      HERNIA REPAIR       INSERT PORT VASCULAR ACCESS Right 8/10/2017    Procedure: INSERT PORT VASCULAR ACCESS;  Single Lumen Right Chest Power Port;  Surgeon: Malena Andrew PA-C;  [facility]     LAPAROSCOPY DIAGNOSTIC (GENERAL) N/A 12/6/2017    Procedure: LAPAROSCOPY DIAGNOSTIC (GENERAL);  Diagnostic Laparoscopy, Exploratory Laparotomy Anesthesia Block ;  Surgeon: Rob Dudley MD;  Location: [facility]     LAPAROTOMY EXPLORATORY N/A 12/6/2017    Procedure: LAPAROTOMY EXPLORATORY;;  Surgeon: Rob Dudley MD;  Location: [facility]     ORTHOPEDIC SURGERY Left     HIP ARTHROPLASTY     PICC INSERTION Right 02/23/2018    5Fr DL BioFlo PICC, 44cm (3cm external) in the R basilic vein w/ tip in the SVC RA junction      Left hip replacement.     Allergies:  Allergies as of 05/18/2018 - Julius as Reviewed 05/18/2018   Allergen Reaction Noted     Liquid adhesive Rash 03/01/2018     Current Medications:  Current Outpatient Prescriptions   Medication Sig Dispense Refill     amylase-lipase-protease (CREON) 58531 UNITS CPEP Take 2 capsules (72,000 Units) by mouth 3 times daily (with meals) And 1 capsule with each snack tid. 240 capsule 3     fentaNYL (DURAGESIC) 25 mcg/hr 72 hr patch Place 1 patch onto the skin every 72 hours remove old patch. 10 patch 0     Ferrous Sulfate (IRON SUPPLEMENT PO) Take 325 mg by mouth 2 times daily (with meals)        LORazepam (ATIVAN) 0.5 MG tablet Take 1 tablet (0.5  "mg) by mouth every 4 hours as needed (Anxiety, Nausea/Vomiting or Sleep) 30 tablet 5     Multiple Vitamins-Minerals (MULTIVITAMIN & MINERAL PO) Take 1 tablet by mouth daily       omeprazole (PRILOSEC) 20 MG CR capsule Take 1 capsule (20 mg) by mouth daily 30 capsule 11     ondansetron (ZOFRAN-ODT) 8 MG ODT tab Take 1 tablet (8 mg) by mouth every 8 hours as needed for nausea 60 tablet 5     opium tincture 10 MG/ML (1%) liquid Take by mouth every 4 hours as needed for diarrhea or moderate pain       oxyCODONE (OXYCONTIN) 10 MG 12 hr tablet Take 2 tablets (20 mg) by mouth every 12 hours 120 tablet 0     oxyCODONE IR (ROXICODONE) 10 MG tablet Take 0.5-1 tablets (5-10 mg) by mouth every 4 hours as needed for moderate to severe pain 90 tablet 0     parenteral nutrition - PTA/DISCHARGE ORDER Inject into the vein daily See medication history note from 2/20/18 for most recent TPN formula.       Psyllium (METAMUCIL FIBER) 51.7 % PACK Take 1 packet by mouth 3 times daily 90 each 3     dronabinol (MARINOL) 5 MG capsule Take 1 capsule (5 mg) by mouth 2 times daily (before meals) (Patient not taking: Reported on 5/18/2018) 60 capsule 0     naloxone (NARCAN) nasal spray Spray 1 spray (4 mg) into one nostril alternating nostrils as needed for opioid reversal every 2-3 minutes until assistance arrives (Patient not taking: Reported on 5/3/2018) 0.2 mL 0     Potassium Chloride 40 MEQ/15ML (20%) SOLN Take 7.5 mLs (20 mEq) by mouth daily 52.5 mL 0     Physical Exam:  General: The patient is a pleasant male in no acute distress.  /73 (BP Location: Left arm, Patient Position: Sitting, Cuff Size: Adult Regular)  Pulse 105  Temp 99.4  F (37.4  C) (Oral)  Resp 16  Ht 1.753 m (5' 9.02\")  Wt 68 kg (150 lb)  SpO2 99%  BMI 22.14 kg/m2  Wt Readings from Last 10 Encounters:   05/18/18 68 kg (150 lb)   05/03/18 67.6 kg (149 lb)   04/19/18 65.2 kg (143 lb 11.2 oz)   04/05/18 65.3 kg (144 lb)   03/29/18 65 kg (143 lb 3.2 oz)   03/22/18 " 67.6 kg (149 lb)   03/19/18 72.2 kg (159 lb 3.2 oz)   03/08/18 69.2 kg (152 lb 8 oz)   03/01/18 68.5 kg (151 lb 1.6 oz)   02/25/18 69.1 kg (152 lb 6.4 oz)   HEENT: EOMI, PERRL. Sclerae are anicteric. Oral mucosa is pink and moist with no lesions or thrush.   Lymph: Neck is supple with no lymphadenopathy in the cervical or supraclavicular areas.   Heart: Regular rate and rhythm.   Lungs: Clear to auscultation bilaterally.   Abdomen: Bowel sounds present, soft, mild central abdominal tenderness with central firmness, ostomy bag is in right abdomen with liquid green stool.  Extremities: No lower extremity edema noted bilaterally.   Neuro: Cranial nerves II through XII are grossly intact.  Skin: PICC line is in place in right arm.  No rashes or lesions on exposed skin.     Laboratory/Imaging Studies    NGS PANEL COLORECTAL    No mutations were identified in analyzed regions of KRAS, NRAS, BRAF, HRAS, or PIK3CA.     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  The sigmoid carcinoma is obstructing, requiring exploratory laparotomy and end ileostomy along with small bowel resection.    MSI intact and NGS panel does not reveal any identifiable mutations   He has been started on FOLFOX palliative chemotherapy. After 6 cycles, he had stable disease  On 12/6/17, he had Diagnostic laparoscopy and Exploratory laparotomy, but HIPEC was not performed as Peritoneal cancer index was 26 with diffuse involvement of the small bowel as well as the small bowel mesentery.   Repeat CT scan showed worsening peritoneal carcinomatosis and he is getting more symptomatic in terms of worsening abdominal pain and difficulty eating because of worsening abdominal cramping. He was admitted to the hospital with worsening colon distention and colon stent was placed which improved his symptoms. Now he is doing much better.   Plan was to resume FOLFOX, but then he was hospitalized with infectious colitis and  bacteremia. He recovered well from this and resumed FOLFOX on 3/8/18. Because of previous neuropathy, the dose of oxaliplatin was decreased to 70 mg/m . We may consider adding Avatin or anti-EGFR therapy in the future as well.     He was tolerating FOLFOX fairly well. However, cycle 3 was worse than the previous two due to fatigue, worsening neuropathy, and increasing ostomy output. He does not wish to proceed with chemotherapy today. We discussed the potential of decreasing the oxaliplatin again and 5-FU doses versus stopping the oxaliplatin all together versus switching to 5-FU and irinotecan. Patient prefers to have 2 weeks off, as he felt he has not been having a good quality of life, which is important to him. He will see Dr. Long in 2 weeks with a CT CAP. He will call sooner for concerns.     Abdominal pain due to peritoneal carcinomatosis and colon obstruction. This is much better after placement of another colonic stent. Pain was initially worse with switching from OxyContin 20 mg bid to Fentanyl 12 mcg/hr. The increase to Fentanyl 25 mcg/hr on 4/3/18 has significantly helped his pain. He will continue with oxycodone prn, currently 10 mg bid and 5 mg qday. Oxycodone was refilled today.     FEN. Patient remains on 12 hours TPN/day and is eating as tolerated. His weight is fairly stable. He did not find a benefit from 2.5 mg Marinol bid. Will try increasing to 5 mg bid.   Hyponatremia. Resolved with daily IV fluids at home.   Hypokalemia. Suspect secondary to increased stool output. Will start liquid potassium 20 mEq daily x 1 week. Will recheck at next visit.   Renal insufficiency. Improving. Anticipate further improvement with break in chemotherapy. He will continue with daily IVF at home.     History of iron deficient anemia. Patient received IV iron in the August 2018. Hemoglobin has stabilized. Iron studies in early March 2018 show replete iron stores. He will continue on oral iron.    High output ostomy.  Again discussed okay to resume tincture of opium. Will need to use cautiously, given history of bowel obstruction.    Dry eyes. Discussed trying Systane or Refresh lubricating eye drops.      Peripheral neuropathy. Worsening. Secondary to oxaliplatin. Patient requests referral for acupuncture, which was placed.     Linda Alves PA-C  [facility] [address] [address]  [phone]               Again, thank you for allowing me to participate in the care of your patient.      Sincerely,    Linda Alves PA-C

## 2018-05-18 NOTE — NURSING NOTE
Chief Complaint   Patient presents with     Oncology Clinic Visit     Return visit related to Colon Cancer     Blood Draw     labs drawn from picc by rn. vs taken     Labs drawn from CVC by rn.  Good blood return noted in both lumens.  Both lumens flushed with NS.  Pt tolerated well.  VS taken.  Pt checked in for next appt.  Aurora Rocha RN

## 2018-05-18 NOTE — PROGRESS NOTES
Infusion Nursing Note:  Sebas Lopez presents today for Cycle 11 Day 1 Oxaliplatin, Fluorouracil pump connection (Oxaliplatin dose #11)  Patient seen by provider today: Yes: ANNAMARIE Mckenna    Note: Patient verbalized understanding of cold sensitivity precautions. Patient instructed to call triage for temp > 100.4, chills, aches. No other concerns at this time.     Intravenous Access:  PICC. Dressing clean, dry, intact. Dressing changed by home health care.    Treatment Conditions:  Lab Results   Component Value Date    HGB 10.2 05/18/2018     Lab Results   Component Value Date    WBC 12.1 05/18/2018      Lab Results   Component Value Date    ANEU 8.0 05/18/2018     Lab Results   Component Value Date     05/18/2018      Lab Results   Component Value Date     05/18/2018                   Lab Results   Component Value Date    POTASSIUM 3.6 05/18/2018           Lab Results   Component Value Date    MAG 2.3 05/18/2018            Lab Results   Component Value Date    CR 0.94 05/18/2018                   Lab Results   Component Value Date    ANNAMARIE 9.1 05/18/2018                Lab Results   Component Value Date    BILITOTAL 0.4 05/18/2018           Lab Results   Component Value Date    ALBUMIN 2.8 05/18/2018                    Lab Results   Component Value Date    ALT 61 05/18/2018           Lab Results   Component Value Date    AST 46 05/18/2018     Results reviewed, labs MET treatment parameters, ok to proceed with treatment.      Post Infusion Assessment:  Patient tolerated infusion without incident.  Blood return noted pre and post infusion.  Site patent and intact, free from redness, edema or discomfort.  No evidence of extravasations.    Prior to discharge: Port is secured in place with tegaderm and flushed with 10cc NS with positive blood return noted.  Continuous home infusion Dosi-Fuser pump connected.    All connectors secured in place and clamps taped open. Connections and pump double checked  with Zeenat Marcos RN.  Patient instructed to call our clinic or [facility] with any questions or concerns at home.  Patient verbalized understanding.    Patient set up for pump disconnect at home with [facility] on Sunday 5/20/18 at 1100 per Rachael DUGGAN at [facility].        Discharge Plan:   Prescription refills given for Opium Tincture.  Discharge instructions reviewed with: Patient.  Patient verbalized understanding of discharge instructions and all questions answered.  Copy of AVS reviewed with patient.  Patient will return 5/31/18 for next appointment.  Patient discharged in stable condition accompanied by: mother.  Face to Face time: 0.    SADIQ HAMILTON, RN

## 2018-05-18 NOTE — PATIENT INSTRUCTIONS
Contact Numbers    [facility] Main Line: [phone]  [facility] Triage and after hours / weekends / holidays:  [phone]      Please call the triage or after hours line if you experience a temperature greater than or equal to 100.5, shaking chills, have uncontrolled nausea, vomiting and/or diarrhea, dizziness, shortness of breath, chest pain, bleeding, unexplained bruising, or if you have any other new/concerning symptoms, questions or concerns.      If you are having any concerning symptoms or wish to speak to a provider before your next infusion visit, please call your care coordinator or triage to notify them so we can adequately serve you.     If you need a refill on a narcotic prescription or other medication, please call before your infusion appointment.           May 2018   [place] Monday Tuesday Wednesday Thursday Friday Saturday             1     2     3     [facility] DRAW   10:15 AM   (15 min.)    [facility] DRAW   [facility] Lab Draw     CT CHEST/ABDOMEN/PELVIS W   11:00 AM   (20 min.)   UCCT1   [facility] CT     [facility] RETURN    1:15 PM   (30 min.)   Albert Long MD   [facility]     LAB    4:30 PM   (15 min.)    LAB   [facility] Lab 4     [facility] ONC INFUSION 240   10:00 AM   (240 min.)    ONCOLOGY INFUSION   [facility] 5       6     7     8     9     10     11     12       13     14     15     16     17     18     [facility] DRAW    9:00 AM   (15 min.)    [facility] DRAW   [facility] Lab Draw     [facility] RETURN    9:15 AM   (50 min.)   Linda Alves PA-C   [facility]     [facility] ONC INFUSION 240   11:30 AM   (240 min.)    ONCOLOGY INFUSION   [facility] 19       20     21     22     23     24     25     26       27     28     29     30     31     [facility] DRAW    9:45 AM   (15 min.)    [facility] DRAW   [facility] Lab Draw     [facility] RETURN   10:05 AM   (50 min.)   Linda Alves  [facility]     [facility] ONC INFUSION 240   11:30 AM   (240 min.)   [facility] ONCOLOGY INFUSION   [facility]                      June 2018 Sunday Monday Tuesday Wednesday Thursday Friday Saturday                            1     2       3     4     5     6     [facility] NEW    2:45 PM   (60 min.)   Isha Byers   [facility] Acupuncture 7     8     9       10     11     12     13     [facility] DRAW    2:45 PM   (15 min.)   [facility] DRAW   [facility] Lab Draw     [facility] RETURN    3:05 PM   (50 min.)   Linda Alves PA-C   [facility] 14     [facility] ONC INFUSION 240    9:30 AM   (240 min.)   [facility] ONCOLOGY INFUSION   [facility] 15  Happy Birthday!     16       17     18     19     20     21     22     23       24     25     26     27     [facility] DRAW   10:30 AM   (15 min.)   [facility] DRAW   [facility] Lab Draw     CT CHEST/ABDOMEN/PELVIS W   11:20 AM   (20 min.)   UCCT2   [facility] CT 28     [facility] RETURN   12:45 PM   (30 min.)   Albert Long MD   [facility]     [facility] ONC INFUSION 240    1:30 PM   (240 min.)   [facility] ONCOLOGY INFUSION   [facility] 29     30                 Recent Results (from the past 24 hour(s))   Magnesium    Collection Time: 05/18/18  9:34 AM   Result Value Ref Range    Magnesium 2.3 1.6 - 2.3 mg/dL   Phosphorus    Collection Time: 05/18/18  9:34 AM   Result Value Ref Range    Phosphorus 4.0 2.5 - 4.5 mg/dL   CBC with platelets differential    Collection Time: 05/18/18  9:34 AM   Result Value Ref Range    WBC 12.1 (H) 4.0 - 11.0 10e9/L    RBC Count 3.69 (L) 4.4 - 5.9 10e12/L    Hemoglobin 10.2 (L) 13.3 - 17.7 g/dL    Hematocrit 32.1 (L) 40.0 - 53.0 %    MCV 87 78 - 100 fl    MCH 27.6 26.5 - 33.0 pg    MCHC 31.8 31.5 - 36.5 g/dL    RDW 17.5 (H) 10.0 - 15.0 %    Platelet Count 324 150 - 450 10e9/L    Diff Method Automated Method     %  Neutrophils 65.9 %    % Lymphocytes 14.4 %    % Monocytes 14.7 %    % Eosinophils 4.4 %    % Basophils 0.3 %    % Immature Granulocytes 0.3 %    Nucleated RBCs 0 0 /100    Absolute Neutrophil 8.0 1.6 - 8.3 10e9/L    Absolute Lymphocytes 1.8 0.8 - 5.3 10e9/L    Absolute Monocytes 1.8 (H) 0.0 - 1.3 10e9/L    Absolute Eosinophils 0.5 0.0 - 0.7 10e9/L    Absolute Basophils 0.0 0.0 - 0.2 10e9/L    Abs Immature Granulocytes 0.0 0 - 0.4 10e9/L    Absolute Nucleated RBC 0.0    Comprehensive metabolic panel    Collection Time: 05/18/18  9:34 AM   Result Value Ref Range    Sodium 131 (L) 133 - 144 mmol/L    Potassium 3.6 3.4 - 5.3 mmol/L    Chloride 97 94 - 109 mmol/L    Carbon Dioxide 24 20 - 32 mmol/L    Anion Gap 10 3 - 14 mmol/L    Glucose 124 (H) 70 - 99 mg/dL    Urea Nitrogen 28 7 - 30 mg/dL    Creatinine 0.94 0.66 - 1.25 mg/dL    GFR Estimate 83 >60 mL/min/1.7m2    GFR Estimate If Black >90 >60 mL/min/1.7m2    Calcium 9.1 8.5 - 10.1 mg/dL    Bilirubin Total 0.4 0.2 - 1.3 mg/dL    Albumin 2.8 (L) 3.4 - 5.0 g/dL    Protein Total 8.3 6.8 - 8.8 g/dL    Alkaline Phosphatase 207 (H) 40 - 150 U/L    ALT 61 0 - 70 U/L    AST 46 (H) 0 - 45 U/L

## 2018-05-18 NOTE — MR AVS SNAPSHOT
After Visit Summary   5/18/2018    Sebas Lopez    MRN: [id number]           Patient Information     Date Of Birth          1965        Visit Information        Provider Department      5/18/2018 9:30 AM Linda Alves PA-C [facility]        Today's Diagnoses     Cancer of sigmoid colon (H)    -  1    Peritoneal carcinomatosis (H)        Diarrhea, unspecified type           Follow-ups after your visit        Your next 10 appointments already scheduled     May 31, 2018  9:45 AM CDT   Masonic Lab Draw with UC MASONIC LAB DRAW   [facility] Mason[facility] Lab Draw ([facility])    [address]  [address]  [facility] [facility] [address]   [phone]            May 31, 2018 10:20 AM CDT   (Arrive by 10:05 AM)   Return Visit with Linda Alves PA-C   [facility] ([facility])    [address] [place] [address]  [address]  [facility] [address]   [phone]            May 31, 2018 11:30 AM CDT   Infusion 240 with UC ONCOLOGY INFUSION, UC 27 ATC   [facility] ([facility])    [address]  [address]  [place] [facility] [address]   [phone]            Jun 13, 2018  2:45 PM CDT   Masonic Lab Draw with UC MASONIC LAB DRAW   [facility] Lab Draw ([facility])    [address]  [address]  [facility] [address]   [phone]            Jun 13, 2018  3:20 PM CDT   (Arrive by 3:05 PM)   Return Visit with Linda Alves PA-C   [facility] ([facility])    [address]  [address]  [facility] [address]   [phone]            Jun 14, 2018  9:30 AM CDT   Infusion 240 with UC ONCOLOGY INFUSION, UC 27 ATC   [facility] ([facility])    [address]  [address]  [facility] [address]   [phone]            Jun 27,  2018 10:30 AM CDT   Masonic Lab Draw with  MASONIC LAB DRAW   [facility] Masonic Lab Draw ([facility])    [address]  [address]  [facility] [id number] [phone]            Jun 27, 2018 11:20 AM CDT   CT CHEST/ABDOMEN/PELVIS W CONTRAST with UCCT2   [facility] ([facility])    [address]  1st Floor  [facility] [id number] [phone]           Please bring any scans or X-rays taken at other hospitals, if similar tests were done. Also bring a list of your medicines, including vitamins, minerals and over-the-counter drugs. It is safest to leave personal items at home.  Be sure to tell your doctor:   If you have any allergies.   If there s any chance you are pregnant.   If you are breastfeeding.  How to prepare:   Do not eat or drink for 2 hours before your exam. If you need to take medicine, you may take it with small sips of water. (We may ask you to take liquid medicine as well.)   Please wear loose clothing, such as a sweat suit or jogging clothes. Avoid snaps, zippers and other metal. We may ask you to undress and put on a hospital gown.  Please arrive 30 minutes early for your CT. Once in the department you might be asked to drink water 15-20 minutes prior to your exam.  If indicated you may be asked to drink an oral contrast in advance of your CT.  If this is the case, the imaging team will let you know or be in contact with you prior to your appointment  Patients over 70 or patients with diabetes or kidney problems:   If you haven t had a blood test (creatinine test) within the last 30 days, the Cardiologist/Radiologist may require you to get this test prior to your exam.  If you have diabetes:   Continue to take your metformin medication on the day of your exam  If you have any questions, please call the Imaging Department where you will have your exam.              Who to contact     If you have questions or need  "follow up information about today's clinic visit or your schedule please contact [facility] directly at [phone].  Normal or non-critical lab and imaging results will be communicated to you by [employer], letter or phone within 4 business days after the clinic has received the results. If you do not hear from us within 7 days, please contact the clinic through [employer] or phone. If you have a critical or abnormal lab result, we will notify you by phone as soon as possible.  Submit refill requests through [employer] or call your pharmacy and they will forward the refill request to us. Please allow 3 business days for your refill to be completed.          Additional Information About Your Visit        [employer] Information     [employer] gives you secure access to your electronic health record. If you see a primary care provider, you can also send messages to your care team and make appointments. If you have questions, please call your primary care clinic.  If you do not have a primary care provider, please call [phone] and they will assist you.        Care EveryWhere ID     This is your Care EveryWhere ID. This could be used by other organizations to access your [facility] medical records  [id number]        Your Vitals Were     Pulse Temperature Respirations Height Pulse Oximetry BMI (Body Mass Index)    105 99.4  F (37.4  C) (Oral) 16 1.753 m (5' 9.02\") 99% 22.14 kg/m2       Blood Pressure from Last 3 Encounters:   05/18/18 103/73   05/04/18 110/71   05/03/18 105/71    Weight from Last 3 Encounters:   05/18/18 68 kg (150 lb)   05/03/18 67.6 kg (149 lb)   04/19/18 65.2 kg (143 lb 11.2 oz)              We Performed the Following     CBC with platelets differential     Comprehensive metabolic panel     Magnesium     Phosphorus          Today's Medication Changes          These changes are accurate as of 5/18/18 11:59 PM.  If you have any questions, ask your nurse or doctor.               These " medicines have changed or have updated prescriptions.        Dose/Directions    * opium tincture 10 MG/ML (1%) liquid   This may have changed:  Another medication with the same name was added. Make sure you understand how and when to take each.   Changed by:  Linda Alves PA-C        Take by mouth every 4 hours as needed for diarrhea or moderate pain   Refills:  0       * opium tincture 10 MG/ML (1%) liquid   This may have changed:  You were already taking a medication with the same name, and this prescription was added. Make sure you understand how and when to take each.   Used for:  Cancer of sigmoid colon (H), Diarrhea, unspecified type   Changed by:  Linda Alves PA-C        Dose:  0.6 mL   Take 0.6 mLs (6 mg) by mouth 4 times daily   Quantity:  72 mL   Refills:  0       oxyCODONE IR 10 MG tablet   Commonly known as:  ROXICODONE   This may have changed:  Another medication with the same name was removed. Continue taking this medication, and follow the directions you see here.   Used for:  Cancer of sigmoid colon (H), Peritoneal carcinomatosis (H)   Changed by:  Linda Alves PA-C        Dose:  5-10 mg   Take 0.5-1 tablets (5-10 mg) by mouth every 4 hours as needed for moderate to severe pain   Quantity:  90 tablet   Refills:  0       * Notice:  This list has 2 medication(s) that are the same as other medications prescribed for you. Read the directions carefully, and ask your doctor or other care provider to review them with you.         Where to get your medicines      Some of these will need a paper prescription and others can be bought over the counter.  Ask your nurse if you have questions.     Bring a paper prescription for each of these medications     opium tincture 10 MG/ML (1%) liquid    oxyCODONE IR 10 MG tablet               Information about OPIOIDS     PRESCRIPTION OPIOIDS: WHAT YOU NEED TO KNOW   You have a prescription for an opioid (narcotic) pain medicine. Opioids can cause  addiction. If you have a history of chemical dependency of any type, you are at a higher risk of becoming addicted to opioids. Only take this medicine after all other options have been tried. Take it for as short a time and as few doses as possible.     Do not:    Drive. If you drive while taking these medicines, you could be arrested for driving under the influence (DUI).    Operate heavy machinery    Do any other dangerous activities while taking these medicines.     Drink any alcohol while taking these medicines.      Take with any other medicines that contain acetaminophen. Read all labels carefully. Look for the word  acetaminophen  or  Tylenol.  Ask your pharmacist if you have questions or are unsure.    Store your pills in a secure place, locked if possible. We will not replace any lost or stolen medicine. If you don t finish your medicine, please throw away (dispose) as directed by your pharmacist. The [place] [employer] has more information about safe disposal: https://www.pca.[facility].us/living-green/managing-unwanted-medications    All opioids tend to cause constipation. Drink plenty of water and eat foods that have a lot of fiber, such as fruits, vegetables, prune juice, apple juice and high-fiber cereal. Take a laxative (Miralax, milk of magnesia, Colace, Senna) if you don t move your bowels at least every other day.          Primary Care Provider Office Phone # Fax #    Jaguar Becker [phone]       [facility] [address] [place], [place] [address]LINE [facility] [address]        Equal Access to Services     [facility]: Hadii aad ku hadasho Soshu, waaxda luqadaha, qaybta kaalmada lewis, bhargav willis. So [facility] [place] [phone].    ATENCIÓN: Si habla español, tiene a cid disposición servicios gratuitos de asistencia lingüística. Llame al [phone].    We comply with applicable federal civil rights laws and [place] laws. We do not discriminate on the  basis of race, color, national origin, age, disability, sex, sexual orientation, or gender identity.            Thank you!     Thank you for choosing [facility]  for your care. Our goal is always to provide you with excellent care. Hearing back from our patients is one way we can continue to improve our services. Please take a few minutes to complete the written survey that you may receive in the mail after your visit with us. Thank you!             Your Updated Medication List - Protect others around you: Learn how to safely use, store and throw away your medicines at www.disposemymeds.org.          This list is accurate as of 5/18/18 11:59 PM.  Always use your most recent med list.                   Brand Name Dispense Instructions for use Diagnosis    amylase-lipase-protease 61625 units Cpep    CREON    240 capsule    Take 2 capsules (72,000 Units) by mouth 3 times daily (with meals) And 1 capsule with each snack tid.    Diarrhea, unspecified type, Peritoneal carcinomatosis (H), Cancer of sigmoid colon (H)       dronabinol 5 MG capsule    MARINOL    60 capsule    Take 1 capsule (5 mg) by mouth 2 times daily (before meals)    Peritoneal carcinomatosis (H), Anorexia       fentaNYL 25 mcg/hr 72 hr patch    DURAGESIC    10 patch    Place 1 patch onto the skin every 72 hours remove old patch.    Cancer of sigmoid colon (H)       IRON SUPPLEMENT PO      Take 325 mg by mouth 2 times daily (with meals)        LORazepam 0.5 MG tablet    ATIVAN    30 tablet    Take 1 tablet (0.5 mg) by mouth every 4 hours as needed (Anxiety, Nausea/Vomiting or Sleep)    Peritoneal carcinomatosis (H), Cancer of sigmoid colon (H)       MULTIVITAMIN & MINERAL PO      Take 1 tablet by mouth daily        naloxone nasal spray    NARCAN    0.2 mL    Spray 1 spray (4 mg) into one nostril alternating nostrils as needed for opioid reversal every 2-3 minutes until assistance arrives    Cancer of sigmoid colon (H), Long term  current use of opiate analgesic       omeprazole 20 MG CR capsule    priLOSEC    30 capsule    Take 1 capsule (20 mg) by mouth daily    Diarrhea, unspecified type       ondansetron 8 MG ODT tab    ZOFRAN-ODT    60 tablet    Take 1 tablet (8 mg) by mouth every 8 hours as needed for nausea    Nausea       * opium tincture 10 MG/ML (1%) liquid      Take by mouth every 4 hours as needed for diarrhea or moderate pain        * opium tincture 10 MG/ML (1%) liquid     72 mL    Take 0.6 mLs (6 mg) by mouth 4 times daily    Cancer of sigmoid colon (H), Diarrhea, unspecified type       oxyCODONE IR 10 MG tablet    ROXICODONE    90 tablet    Take 0.5-1 tablets (5-10 mg) by mouth every 4 hours as needed for moderate to severe pain    Cancer of sigmoid colon (H), Peritoneal carcinomatosis (H)       parenteral nutrition - PTA/DISCHARGE ORDER      Inject into the vein daily See medication history note from 2/20/18 for most recent TPN formula.        Potassium Chloride 40 MEQ/15ML (20%) Soln     52.5 mL    Take 7.5 mLs (20 mEq) by mouth daily        Psyllium 51.7 % Pack    METAMUCIL FIBER    90 each    Take 1 packet by mouth 3 times daily    Diarrhea, unspecified type       * Notice:  This list has 2 medication(s) that are the same as other medications prescribed for you. Read the directions carefully, and ask your doctor or other care provider to review them with you.

## 2018-05-18 NOTE — LETTER
5/18/2018      RE: Sebas Lopez  [address] [place] [address]       Oncology follow up visit:  Date on this visit: May 18, 2018    CC  sigmoid adenocarcinoma with peritoneal carcinomatosis    Primary Physician: Waylon Han     History Of Present Illness:     Please see previous note for details. I have copied and updated from prior notes.   Sebas is a 52 year old male who has a history of ulcerative colitis diagnosed more than 20 years ago, but he has not had medical management for that.  He was doing well, but in 05/2017 he presented with a few weeks of abdominal pain.  At that time, his imaging showed that he had a sigmoid wall thickening with adjacent mesenteric lymphadenopathy and free fluid near the abdominal wall mesh from his prior hernia surgery.  He subsequently underwent a colonoscopy which was incomplete and showed an obstructing sigmoid colon mass.  The biopsy from the sigmoid mass showed sigmoid adenocarcinoma.  He then developed obstructive symptoms and underwent exploratory laparotomy on 07/10/2017.  During that he was found to have diffuse peritoneal carcinomatosis.  Extensive lysis of adhesions was performed and a small bowel resection was performed with end ileostomy.  The biopsies from the multiple large peritoneal metastasis also were positive for metastatic adenocarcinoma. We still haven't received testing on MSI or NGS panel back     He started palliative FOLFOX on 8/11/17. C#6 given on 10/26/17  After 6 cycles, he has stable disease    On 12/6/17, he had Diagnostic laparoscopy and Exploratory laparotomy, but HIPEC was not performed as Peritoneal cancer index was 26 with diffuse involvement of the small bowel as well as the small bowel mesentery.     He then presented to ED on 1/26 with abdominal pain and associated nausea. CT A/P on 1/26 with 5.2 x 4.5 x 3.6 cm proximal sigmoid mass causnig colonic obstriction with singificant distention of cecum (7cm in diameter),  ascending colon and proximal transverse colon. No pneumatosis or pneumoperitoneum. Also small volume of ascites with associated findings concerning for peritoneal carcinomatosis, increased from prior studies. Dr. Dudley was consulted and recommended no surgical intervention.    He was then seen in clinic on 1/29/2018 due to inability to tolerate solid foods and he was getting abdominal cramping and some nausea. He also noticed that his ostomy output decreased. He was given IV fluids as well as antibiotics and was started on OxyContin 10 mg twice a day along with p.r.n. oxycodone. Of note a few weeks prior he was started on short acting octreotide to decrease the ostomy output but he stopped taking it due to worsening of the abdominal cramping. A colonic stent was placed on 2/6/18.    He was recently hospitalized from 2/20-2/25/18 with presumed infectious colitis and bacteremia. His port was removed. He was discharged home on IV vancomycin. He resumed FOLFOX with dose reduced oxaliplatin due to previous neuropathy on 3/8/18. He was hospitalized with a bowel obstruction from 3/18-3/20/18 and a colonic stent was placed on 3/19/18. He comes in today for routine follow up prior to his next cycle of FOLFOX.    Interim history  Patient reports that he had a low-grade fever of 99.3 F last night.  He is feeling fine today without a fever.  He also reports that he had a headache last night that resolved without medication.  He was out doing some yard work recently and feels he may have pulled an abdominal muscle.  He is taking oxycodone 1-2 times per day in addition to his fentanyl patches.  He continues to have liquidy stools through his ostomy and is taking tincture of opium 0.6 mL 4 times a day.  He continues to have mild neuropathy and will be starting with acupuncture in June.  He reports that his lip has felt more sensitive lately but no open areas.  He reports sleeping fair.  He is napping for less than 1 hour per  day.  He continues on TPN at 12 hours a day.  He reports his appetite is been okay.  He has not found improvement with the Marinol.  He continues to use 500 mL of IV fluids 1-2 times per day.  He feels his dry eyes are doing a little better.  He denies other concerns.    Review of Systems  Patient denies any of the following except if noted above: chills, vision or hearing changes, chest pain, dyspnea, nausea, vomiting, constipation, urinary concerns, or issues with mood.     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     Ulcerative colitis (H)      Past Surgical History:   Procedure Laterality Date     COLONOSCOPY  2017     COLONOSCOPY N/A 11/30/2017    Procedure: COLONOSCOPY;  Colonoscopy;  Surgeon: Rob Dudley MD;  Location: [facility]     COLONOSCOPY N/A 2/6/2018    Procedure: COMBINED COLONOSCOPY, STENT PLACEMENT;  COMBINED COLONOSCOPY with Colonic Stent Placement;  Surgeon: Guru Lionel Mar MD;  Location: [facility]     COLONOSCOPY N/A 3/19/2018    Procedure: COMBINED COLONOSCOPY, STENT PLACEMENT;  flexible sigmoidoscopy with stent placement and dilation;  Surgeon: Alexis Rios MD;  Location: [facility]     GI SURGERY  07/10/2017    Extensive lysis of adhesions, small bowel resection with end ileostomy.      HERNIA REPAIR       INSERT PORT VASCULAR ACCESS Right 8/10/2017    Procedure: INSERT PORT VASCULAR ACCESS;  Single Lumen Right Chest Power Port;  Surgeon: Malena Andrew PA-C;  Location: [facility]     LAPAROSCOPY DIAGNOSTIC (GENERAL) N/A 12/6/2017    Procedure: LAPAROSCOPY DIAGNOSTIC (GENERAL);  Diagnostic Laparoscopy, Exploratory Laparotomy Anesthesia Block ;  Surgeon:  Rob Dudley MD;  Location: [facility]     LAPAROTOMY EXPLORATORY N/A 12/6/2017    Procedure: LAPAROTOMY EXPLORATORY;;  Surgeon: Rob Dudley MD;  Location: [facility]     ORTHOPEDIC SURGERY Left     HIP ARTHROPLASTY     PICC INSERTION Right 02/23/2018    5Fr DL BioFlo PICC, 44cm (3cm external) in the R basilic vein w/ tip in the SVC RA junction      Left hip replacement.     Allergies:  Allergies as of 05/18/2018 - Julius as Reviewed 05/18/2018   Allergen Reaction Noted     Liquid adhesive Rash 03/01/2018     Current Medications:  Current Outpatient Prescriptions   Medication Sig Dispense Refill     amylase-lipase-protease (CREON) 31036 UNITS CPEP Take 2 capsules (72,000 Units) by mouth 3 times daily (with meals) And 1 capsule with each snack tid. 240 capsule 3     fentaNYL (DURAGESIC) 25 mcg/hr 72 hr patch Place 1 patch onto the skin every 72 hours remove old patch. 10 patch 0     Ferrous Sulfate (IRON SUPPLEMENT PO) Take 325 mg by mouth 2 times daily (with meals)        LORazepam (ATIVAN) 0.5 MG tablet Take 1 tablet (0.5 mg) by mouth every 4 hours as needed (Anxiety, Nausea/Vomiting or Sleep) 30 tablet 5     Multiple Vitamins-Minerals (MULTIVITAMIN & MINERAL PO) Take 1 tablet by mouth daily       omeprazole (PRILOSEC) 20 MG CR capsule Take 1 capsule (20 mg) by mouth daily 30 capsule 11     ondansetron (ZOFRAN-ODT) 8 MG ODT tab Take 1 tablet (8 mg) by mouth every 8 hours as needed for nausea 60 tablet 5     opium tincture 10 MG/ML (1%) liquid Take by mouth every 4 hours as needed for diarrhea or moderate pain       oxyCODONE (OXYCONTIN) 10 MG 12 hr tablet Take 2 tablets (20 mg) by mouth every 12 hours 120 tablet 0     oxyCODONE IR (ROXICODONE) 10 MG tablet Take 0.5-1 tablets (5-10 mg) by mouth every 4 hours as needed for moderate to severe pain 90 tablet 0     parenteral nutrition - PTA/DISCHARGE ORDER Inject into the vein daily See medication history note from 2/20/18 for most recent TPN formula.    "    Psyllium (METAMUCIL FIBER) 51.7 % PACK Take 1 packet by mouth 3 times daily 90 each 3     dronabinol (MARINOL) 5 MG capsule Take 1 capsule (5 mg) by mouth 2 times daily (before meals) (Patient not taking: Reported on 5/18/2018) 60 capsule 0     naloxone (NARCAN) nasal spray Spray 1 spray (4 mg) into one nostril alternating nostrils as needed for opioid reversal every 2-3 minutes until assistance arrives (Patient not taking: Reported on 5/3/2018) 0.2 mL 0     Potassium Chloride 40 MEQ/15ML (20%) SOLN Take 7.5 mLs (20 mEq) by mouth daily 52.5 mL 0     Physical Exam:  General: The patient is a pleasant male in no acute distress.  /73 (BP Location: Left arm, Patient Position: Sitting, Cuff Size: Adult Regular)  Pulse 105  Temp 99.4  F (37.4  C) (Oral)  Resp 16  Ht 1.753 m (5' 9.02\")  Wt 68 kg (150 lb)  SpO2 99%  BMI 22.14 kg/m2  Wt Readings from Last 10 Encounters:   05/18/18 68 kg (150 lb)   05/03/18 67.6 kg (149 lb)   04/19/18 65.2 kg (143 lb 11.2 oz)   04/05/18 65.3 kg (144 lb)   03/29/18 65 kg (143 lb 3.2 oz)   03/22/18 67.6 kg (149 lb)   03/19/18 72.2 kg (159 lb 3.2 oz)   03/08/18 69.2 kg (152 lb 8 oz)   03/01/18 68.5 kg (151 lb 1.6 oz)   02/25/18 69.1 kg (152 lb 6.4 oz)   HEENT: EOMI, PERRL. Sclerae are anicteric. Oral mucosa is pink and moist with no lesions or thrush.   Lymph: Neck is supple with no lymphadenopathy in the cervical or supraclavicular areas.   Heart: Regular rate and rhythm.   Lungs: Clear to auscultation bilaterally.   Abdomen: Bowel sounds present, soft, mild central abdominal tenderness with central firmness, ostomy bag is in right abdomen with liquid green stool.  Extremities: No lower extremity edema noted bilaterally.   Neuro: Cranial nerves II through XII are grossly intact.  Skin: PICC line is in place in right arm.  No rashes or lesions on exposed skin.     Laboratory/Imaging Studies   5/18/2018 09:34   Sodium 131 (L)   Potassium 3.6   Chloride 97   Carbon Dioxide 24 "   Urea Nitrogen 28   Creatinine 0.94   GFR Estimate 83   GFR Estimate If Black >90   Calcium 9.1   Anion Gap 10   Magnesium 2.3   Phosphorus 4.0   Albumin 2.8 (L)   Protein Total 8.3   Bilirubin Total 0.4   Alkaline Phosphatase 207 (H)   ALT 61   AST 46 (H)   Glucose 124 (H)   WBC 12.1 (H)   Hemoglobin 10.2 (L)   Hematocrit 32.1 (L)   Platelet Count 324   RBC Count 3.69 (L)   MCV 87   MCH 27.6   MCHC 31.8   RDW 17.5 (H)   Diff Method Automated Method   % Neutrophils 65.9   % Lymphocytes 14.4   % Monocytes 14.7   % Eosinophils 4.4   % Basophils 0.3   % Immature Granulocytes 0.3   Nucleated RBCs 0   Absolute Neutrophil 8.0   Absolute Lymphocytes 1.8   Absolute Monocytes 1.8 (H)   Absolute Eosinophils 0.5   Absolute Basophils 0.0   Abs Immature Granulocytes 0.0   Absolute Nucleated RBC 0.0     NGS PANEL COLORECTAL    No mutations were identified in analyzed regions of KRAS, NRAS, BRAF, HRAS, or PIK3CA.     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  The sigmoid carcinoma is obstructing, requiring exploratory laparotomy and end ileostomy along with small bowel resection.    MSI intact and NGS panel does not reveal any identifiable mutations   He has been started on FOLFOX palliative chemotherapy. After 6 cycles, he had stable disease  On 12/6/17, he had Diagnostic laparoscopy and Exploratory laparotomy, but HIPEC was not performed as Peritoneal cancer index was 26 with diffuse involvement of the small bowel as well as the small bowel mesentery.   Repeat CT scan showed worsening peritoneal carcinomatosis and he is getting more symptomatic in terms of worsening abdominal pain and difficulty eating because of worsening abdominal cramping. He was admitted to the hospital with worsening colon distention and colon stent was placed which improved his symptoms. Now he is doing much better.   Plan was to resume FOLFOX, but then he was hospitalized with infectious colitis and  bacteremia. He recovered well from this and resumed FOLFOX on 3/8/18. Because of previous neuropathy, the dose of oxaliplatin was decreased to 70 mg/m . We may consider adding Avatin or anti-EGFR therapy in the future as well.     He was tolerating FOLFOX fairly well. However, cycle 3 was worse than the previous two due to fatigue, worsening neuropathy, and increasing ostomy output, so treatment was held due to patient preference. CT CAP on 5/3/18 showed mild improvement in his disease. Due to his progressive symptoms, the 5-FU bolus was held beginning with cycle 10. He is doing well today and will continue with cycle 11 FOLFOX today. He will follow up every 2 weeks prior to each cycle of chemotherapy and will have repeat imaging the end of June. He will call sooner for concerns.     Abdominal pain due to peritoneal carcinomatosis and colon obstruction. This is much better after placement of another colonic stent. Pain was initially worse with switching from OxyContin 20 mg bid to Fentanyl 12 mcg/hr. The increase to Fentanyl 25 mcg/hr on 4/3/18 has significantly helped his pain. He will continue with oxycodone prn, currently 10 mg bid.    FEN. Patient remains on 12 hours TPN/day and is eating as tolerated. His weight is fairly stable. He has not found a benefit from 5 mg bid Marinol, but has not taken it consistently. Discussed trying to take it more consistently. If still no benefit, could further increase the dose.  Hyponatremia. Recommend doing 1L IV NS daily at home.    Hypokalemia. Resolved. Will continue to monitor.  Renal insufficiency. Resolved. He will continue with daily IVF at home.     History of iron deficient anemia. Patient received IV iron in the August 2018. Hemoglobin has stabilized. Iron studies in early March 2018 show replete iron stores. He will continue on oral iron.    High output ostomy. Will continue with tincture of opium qid.     Peripheral neuropathy. Stable. Secondary to oxaliplatin.   Will try acupuncture in June.     iLnda Alves PA-C  [facility]  [address] [place] [address] [phone]

## 2018-05-18 NOTE — MR AVS SNAPSHOT
After Visit Summary   5/18/2018    Sebas Lopez    MRN: [id number]           Patient Information     Date Of Birth          1965        Visit Information        Provider Department      5/18/2018 11:30 AM UC 20 ATC;  ONCOLOGY INFUSION [facility]        Today's Diagnoses     Peritoneal carcinomatosis (H)    -  1    Cancer of sigmoid colon (H)          Care Instructions    Contact Numbers    [facility] Main Line: [phone]  [facility] Triage and after hours / weekends / holidays:  [phone]      Please call the triage or after hours line if you experience a temperature greater than or equal to 100.5, shaking chills, have uncontrolled nausea, vomiting and/or diarrhea, dizziness, shortness of breath, chest pain, bleeding, unexplained bruising, or if you have any other new/concerning symptoms, questions or concerns.      If you are having any concerning symptoms or wish to speak to a provider before your next infusion visit, please call your care coordinator or triage to notify them so we can adequately serve you.     If you need a refill on a narcotic prescription or other medication, please call before your infusion appointment.           May 2018   [place] Monday Tuesday Wednesday Thursday Friday Saturday             1     2     3     P [facility] DRAW   10:15 AM   (15 min.)   [facility] DRAW   [facility] Draw     CT CHEST/ABDOMEN/PELVIS W   11:00 AM   (20 min.)   UCCT1   [facility] CT     UMP RETURN    1:15 PM   (30 min.)   Albert Long MD   [facility]     LAB    4:30 PM   (15 min.)    LAB   [facility] Lab 4     UMP ONC INFUSION 240   10:00 AM   (240 min.)    ONCOLOGY INFUSION   [facility] 5       6     7     8     9     10     11     12       13     14     15     16     17     18     [facility] DRAW    9:00 AM   (15 min.)    [facility] DRAW   [facility] Lab Draw     UMP RETURN    9:15 AM    (50 min.)   Linda Alves PA-C   [facility]     UMP ONC INFUSION 240   11:30 AM   (240 min.)   UC ONCOLOGY INFUSION   [facility] 19       20     21     22     23     24     25     26       27     28     29     30     31     [facility] DRAW    9:45 AM   (15 min.)   [facility]RAW   [facility] Draw     UMP RETURN   10:05 AM   (50 min.)   Linda Alves PA-C   [facility]     UMP ONC INFUSION 240   11:30 AM   (240 min.)   UC ONCOLOGY INFUSION   [facility]                      June 2018 Sunday Monday Tuesday Wednesday Thursday Friday Saturday                            1     2       3     4     5     6     UMP NEW    2:45 PM   (60 min.)   Isha Byers   [facility] 7     8     9       10     11     12     13     [facility] DRAW    2:45 PM   (15 min.)    [facility] DRAW   [facility] Draw     UMP RETURN    3:05 PM   (50 min.)   Linda Alves PA-C   [facility] 14     UMP ONC INFUSION 240    9:30 AM   (240 min.)   UC ONCOLOGY INFUSION   [facility] 15  Happy Birthday!     16       17     18     19     20     21     22     23       24     25     26     27     [facility] DRAW   10:30 AM   (15 min.)    [facility] DRAW   [facility] Draw     CT CHEST/ABDOMEN/PELVIS W   11:20 AM   (20 min.)   UCCT2   [facility] CT 28     UMP RETURN   12:45 PM   (30 min.)   Albert Long MD   [facility]     UMP ONC INFUSION 240    1:30 PM   (240 min.)   UC ONCOLOGY INFUSION   [facility] 29     30                 Recent Results (from the past 24 hour(s))   Magnesium    Collection Time: 05/18/18  9:34 AM   Result Value Ref Range    Magnesium 2.3 1.6 - 2.3 mg/dL   Phosphorus    Collection Time: 05/18/18  9:34 AM   Result Value Ref Range    Phosphorus 4.0 2.5 - 4.5 mg/dL   CBC with platelets  differential    Collection Time: 05/18/18  9:34 AM   Result Value Ref Range    WBC 12.1 (H) 4.0 - 11.0 10e9/L    RBC Count 3.69 (L) 4.4 - 5.9 10e12/L    Hemoglobin 10.2 (L) 13.3 - 17.7 g/dL    Hematocrit 32.1 (L) 40.0 - 53.0 %    MCV 87 78 - 100 fl    MCH 27.6 26.5 - 33.0 pg    MCHC 31.8 31.5 - 36.5 g/dL    RDW 17.5 (H) 10.0 - 15.0 %    Platelet Count 324 150 - 450 10e9/L    Diff Method Automated Method     % Neutrophils 65.9 %    % Lymphocytes 14.4 %    % Monocytes 14.7 %    % Eosinophils 4.4 %    % Basophils 0.3 %    % Immature Granulocytes 0.3 %    Nucleated RBCs 0 0 /100    Absolute Neutrophil 8.0 1.6 - 8.3 10e9/L    Absolute Lymphocytes 1.8 0.8 - 5.3 10e9/L    Absolute Monocytes 1.8 (H) 0.0 - 1.3 10e9/L    Absolute Eosinophils 0.5 0.0 - 0.7 10e9/L    Absolute Basophils 0.0 0.0 - 0.2 10e9/L    Abs Immature Granulocytes 0.0 0 - 0.4 10e9/L    Absolute Nucleated RBC 0.0    Comprehensive metabolic panel    Collection Time: 05/18/18  9:34 AM   Result Value Ref Range    Sodium 131 (L) 133 - 144 mmol/L    Potassium 3.6 3.4 - 5.3 mmol/L    Chloride 97 94 - 109 mmol/L    Carbon Dioxide 24 20 - 32 mmol/L    Anion Gap 10 3 - 14 mmol/L    Glucose 124 (H) 70 - 99 mg/dL    Urea Nitrogen 28 7 - 30 mg/dL    Creatinine 0.94 0.66 - 1.25 mg/dL    GFR Estimate 83 >60 mL/min/1.7m2    GFR Estimate If Black >90 >60 mL/min/1.7m2    Calcium 9.1 8.5 - 10.1 mg/dL    Bilirubin Total 0.4 0.2 - 1.3 mg/dL    Albumin 2.8 (L) 3.4 - 5.0 g/dL    Protein Total 8.3 6.8 - 8.8 g/dL    Alkaline Phosphatase 207 (H) 40 - 150 U/L    ALT 61 0 - 70 U/L    AST 46 (H) 0 - 45 U/L             Follow-ups after your visit        Your next 10 appointments already scheduled     May 31, 2018  9:45 AM CDT   [facility] Draw with  [facility] DRAW   [facility] Draw ([facility])    [address] [address]   [phone]            May 31, 2018 10:20 AM CDT   (Arrive by 10:05 AM)   Return Visit  with Linda Alves PA-C   [facility] ([facility])    [address]  [address]  [facility] [address]   [phone]            May 31, 2018 11:30 AM CDT   Infusion 240 with UC ONCOLOGY INFUSION, UC 27 ATC   [facility] ([facility])    [address]  [address]  [place] [address]   [phone]            Jun 06, 2018  3:00 PM CDT   (Arrive by 2:45 PM)   New Patient Visit with Isha Byers   [facility] ([facility])    [address]  [address]  [place] [address]   [phone]            Jun 13, 2018  2:45 PM CDT   [facility] Lab Draw with UC [facility] DRAW   [facility] Draw ([facility])    [address]  [address]  [place] [address]   [phone]            Jun 13, 2018  3:20 PM CDT   (Arrive by 3:05 PM)   Return Visit with Linda Alves PA-C   [facility] ([facility])    [address]  [address]  [place] [address]   [phone]            Jun 14, 2018  9:30 AM CDT   Infusion 240 with UC ONCOLOGY INFUSION, UC 27 ATC   [facility] ([facility])    [address]  [address]  [place] [address]   [phone]            Jun 27, 2018 10:30 AM CDT   [facility] Draw with UC [facility] DRAW   [facility] ([facility])    [address]  [address]  [place] [address]   [phone]              Future tests that were ordered for you today     Open Future Orders        Priority Expected Expires Ordered    CT Chest/Abdomen/Pelvis w Contrast Routine 6/27/2018 5/18/2019 5/18/2018            Who to contact     If you have questions or need follow up information about today's clinic visit or your schedule please contact [facility]  CLINIC directly at [phone].  Normal or non-critical lab and imaging results will be communicated to you by Future [employer], letter or phone within 4 business days after the clinic has received the results. If you do not hear from us within 7 days, please contact the clinic through [employer] or phone. If you have a critical or abnormal lab result, we will notify you by phone as soon as possible.  Submit refill requests through Glassful or call your pharmacy and they will forward the refill request to us. Please allow 3 business days for your refill to be completed.          Additional Information About Your Visit        Future [employer] Information     Gl[employer]ful gives you secure access to your electronic health record. If you see a primary care provider, you can also send messages to your care team and make appointments. If you have questions, please call your primary care clinic.  If you do not have a primary care provider, please call [phone] and they will assist you.        Care EveryWhere ID     This is your Care EveryWhere ID. This could be used by other organizations to access your [place] medical records  [id number]        Your Vitals Were     Pulse Temperature                100 99.4  F (37.4  C) (Tympanic)           Blood Pressure from Last 3 Encounters:   05/18/18 103/73   05/04/18 110/71   05/03/18 105/71    Weight from Last 3 Encounters:   05/18/18 68 kg (150 lb)   05/03/18 67.6 kg (149 lb)   04/19/18 65.2 kg (143 lb 11.2 oz)              Today, you had the following     No orders found for display         Today's Medication Changes          These changes are accurate as of 5/18/18  1:23 PM.  If you have any questions, ask your nurse or doctor.               These medicines have changed or have updated prescriptions.        Dose/Directions    * opium tincture 10 MG/ML (1%) liquid   This may have changed:  Another medication with the same name was added. Make sure you understand how and when to take each.   Changed  by:  Linda Alves PA-C        Take by mouth every 4 hours as needed for diarrhea or moderate pain   Refills:  0       * opium tincture 10 MG/ML (1%) liquid   This may have changed:  You were already taking a medication with the same name, and this prescription was added. Make sure you understand how and when to take each.   Used for:  Cancer of sigmoid colon (H), Diarrhea, unspecified type   Changed by:  Linda Alves PA-C        Dose:  0.6 mL   Take 0.6 mLs (6 mg) by mouth 4 times daily   Quantity:  72 mL   Refills:  0       oxyCODONE IR 10 MG tablet   Commonly known as:  ROXICODONE   This may have changed:  Another medication with the same name was removed. Continue taking this medication, and follow the directions you see here.   Used for:  Cancer of sigmoid colon (H), Peritoneal carcinomatosis (H)   Changed by:  Linda Alves PA-C        Dose:  5-10 mg   Take 0.5-1 tablets (5-10 mg) by mouth every 4 hours as needed for moderate to severe pain   Quantity:  90 tablet   Refills:  0       * Notice:  This list has 2 medication(s) that are the same as other medications prescribed for you. Read the directions carefully, and ask your doctor or other care provider to review them with you.         Where to get your medicines      Some of these will need a paper prescription and others can be bought over the counter.  Ask your nurse if you have questions.     Bring a paper prescription for each of these medications     opium tincture 10 MG/ML (1%) liquid    oxyCODONE IR 10 MG tablet                Primary Care Provider Office Phone # Fax #    Jaguar Becker [phone]       [facility] [address] [place] [id number]        Equal Access to Services     [facility]: Cristina Funk, waiain abernathy, qaybta kaalaugustine saucedo, bhargav willis. So [facility] [phone].    ATENCIÓN: Si habla español, tiene a cid disposición servicios  ryann de asistencia lingüística. Kenan pemberton [phone].    We comply with applicable federal civil rights laws and [place] laws. We do not discriminate on the basis of race, color, national origin, age, disability, sex, sexual orientation, or gender identity.            Thank you!     Thank you for choosing [facility]  for your care. Our goal is always to provide you with excellent care. Hearing back from our patients is one way we can continue to improve our services. Please take a few minutes to complete the written survey that you may receive in the mail after your visit with us. Thank you!             Your Updated Medication List - Protect others around you: Learn how to safely use, store and throw away your medicines at www.disposemymeds.org.          This list is accurate as of 5/18/18  1:23 PM.  Always use your most recent med list.                   Brand Name Dispense Instructions for use Diagnosis    amylase-lipase-protease 50162 units Cpep    CREON    240 capsule    Take 2 capsules (72,000 Units) by mouth 3 times daily (with meals) And 1 capsule with each snack tid.    Diarrhea, unspecified type, Peritoneal carcinomatosis (H), Cancer of sigmoid colon (H)       dronabinol 5 MG capsule    MARINOL    60 capsule    Take 1 capsule (5 mg) by mouth 2 times daily (before meals)    Peritoneal carcinomatosis (H), Anorexia       fentaNYL 25 mcg/hr 72 hr patch    DURAGESIC    10 patch    Place 1 patch onto the skin every 72 hours remove old patch.    Cancer of sigmoid colon (H)       IRON SUPPLEMENT PO      Take 325 mg by mouth 2 times daily (with meals)        LORazepam 0.5 MG tablet    ATIVAN    30 tablet    Take 1 tablet (0.5 mg) by mouth every 4 hours as needed (Anxiety, Nausea/Vomiting or Sleep)    Peritoneal carcinomatosis (H), Cancer of sigmoid colon (H)       MULTIVITAMIN & MINERAL PO      Take 1 tablet by mouth daily        naloxone nasal spray    NARCAN    0.2 mL    Spray 1 spray  (4 mg) into one nostril alternating nostrils as needed for opioid reversal every 2-3 minutes until assistance arrives    Cancer of sigmoid colon (H), Long term current use of opiate analgesic       omeprazole 20 MG CR capsule    priLOSEC    30 capsule    Take 1 capsule (20 mg) by mouth daily    Diarrhea, unspecified type       ondansetron 8 MG ODT tab    ZOFRAN-ODT    60 tablet    Take 1 tablet (8 mg) by mouth every 8 hours as needed for nausea    Nausea       * opium tincture 10 MG/ML (1%) liquid      Take by mouth every 4 hours as needed for diarrhea or moderate pain        * opium tincture 10 MG/ML (1%) liquid     72 mL    Take 0.6 mLs (6 mg) by mouth 4 times daily    Cancer of sigmoid colon (H), Diarrhea, unspecified type       oxyCODONE IR 10 MG tablet    ROXICODONE    90 tablet    Take 0.5-1 tablets (5-10 mg) by mouth every 4 hours as needed for moderate to severe pain    Cancer of sigmoid colon (H), Peritoneal carcinomatosis (H)       parenteral nutrition - PTA/DISCHARGE ORDER      Inject into the vein daily See medication history note from 2/20/18 for most recent TPN formula.        Potassium Chloride 40 MEQ/15ML (20%) Soln     52.5 mL    Take 7.5 mLs (20 mEq) by mouth daily        Psyllium 51.7 % Pack    METAMUCIL FIBER    90 each    Take 1 packet by mouth 3 times daily    Diarrhea, unspecified type       * Notice:  This list has 2 medication(s) that are the same as other medications prescribed for you. Read the directions carefully, and ask your doctor or other care provider to review them with you.

## 2018-05-18 NOTE — PROGRESS NOTES
Oncology follow up visit:  Date on this visit: May 18, 2018    CC  sigmoid adenocarcinoma with peritoneal carcinomatosis    Primary Physician: Waylon Han     History Of Present Illness:     Please see previous note for details. I have copied and updated from prior notes.   Sebas is a 52 year old male who has a history of ulcerative colitis diagnosed more than 20 years ago, but he has not had medical management for that.  He was doing well, but in 05/2017 he presented with a few weeks of abdominal pain.  At that time, his imaging showed that he had a sigmoid wall thickening with adjacent mesenteric lymphadenopathy and free fluid near the abdominal wall mesh from his prior hernia surgery.  He subsequently underwent a colonoscopy which was incomplete and showed an obstructing sigmoid colon mass.  The biopsy from the sigmoid mass showed sigmoid adenocarcinoma.  He then developed obstructive symptoms and underwent exploratory laparotomy on 07/10/2017.  During that he was found to have diffuse peritoneal carcinomatosis.  Extensive lysis of adhesions was performed and a small bowel resection was performed with end ileostomy.  The biopsies from the multiple large peritoneal metastasis also were positive for metastatic adenocarcinoma. We still haven't received testing on MSI or NGS panel back     He started palliative FOLFOX on 8/11/17. C#6 given on 10/26/17  After 6 cycles, he has stable disease    On 12/6/17, he had Diagnostic laparoscopy and Exploratory laparotomy, but HIPEC was not performed as Peritoneal cancer index was 26 with diffuse involvement of the small bowel as well as the small bowel mesentery.     He then presented to ED on 1/26 with abdominal pain and associated nausea. CT A/P on 1/26 with 5.2 x 4.5 x 3.6 cm proximal sigmoid mass causnig colonic obstriction with singificant distention of cecum (7cm in diameter), ascending colon and proximal transverse colon. No pneumatosis or pneumoperitoneum. Also  small volume of ascites with associated findings concerning for peritoneal carcinomatosis, increased from prior studies. Dr. Dduley was consulted and recommended no surgical intervention.    He was then seen in clinic on 1/29/2018 due to inability to tolerate solid foods and he was getting abdominal cramping and some nausea. He also noticed that his ostomy output decreased. He was given IV fluids as well as antibiotics and was started on OxyContin 10 mg twice a day along with p.r.n. oxycodone. Of note a few weeks prior he was started on short acting octreotide to decrease the ostomy output but he stopped taking it due to worsening of the abdominal cramping. A colonic stent was placed on 2/6/18.    He was recently hospitalized from 2/20-2/25/18 with presumed infectious colitis and bacteremia. His port was removed. He was discharged home on IV vancomycin. He resumed FOLFOX with dose reduced oxaliplatin due to previous neuropathy on 3/8/18. He was hospitalized with a bowel obstruction from 3/18-3/20/18 and a colonic stent was placed on 3/19/18. He comes in today for routine follow up prior to his next cycle of FOLFOX.    Interim history  Patient reports that he had a low-grade fever of 99.3 F last night.  He is feeling fine today without a fever.  He also reports that he had a headache last night that resolved without medication.  He was out doing some yard work recently and feels he may have pulled an abdominal muscle.  He is taking oxycodone 1-2 times per day in addition to his fentanyl patches.  He continues to have liquidy stools through his ostomy and is taking tincture of opium 0.6 mL 4 times a day.  He continues to have mild neuropathy and will be starting with acupuncture in June.  He reports that his lip has felt more sensitive lately but no open areas.  He reports sleeping fair.  He is napping for less than 1 hour per day.  He continues on TPN at 12 hours a day.  He reports his appetite is been okay.  He  has not found improvement with the Marinol.  He continues to use 500 mL of IV fluids 1-2 times per day.  He feels his dry eyes are doing a little better.  He denies other concerns.    Review of Systems  Patient denies any of the following except if noted above: chills, vision or hearing changes, chest pain, dyspnea, nausea, vomiting, constipation, urinary concerns, or issues with mood.     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     Ulcerative colitis (H)      Past Surgical History:   Procedure Laterality Date     COLONOSCOPY  2017     COLONOSCOPY N/A 11/30/2017    Procedure: COLONOSCOPY;  Colonoscopy;  Surgeon: Rob Dudley MD;  Location: [facility]     COLONOSCOPY N/A 2/6/2018    Procedure: COMBINED COLONOSCOPY, STENT PLACEMENT;  COMBINED COLONOSCOPY with Colonic Stent Placement;  Surgeon: Guru Lionel Mar MD;  Location: [facility]     COLONOSCOPY N/A 3/19/2018    Procedure: COMBINED COLONOSCOPY, STENT PLACEMENT;  flexible sigmoidoscopy with stent placement and dilation;  Surgeon: Alexis Rios MD;  Location: [facility]     GI SURGERY  07/10/2017    Extensive lysis of adhesions, small bowel resection with end ileostomy.      HERNIA REPAIR       INSERT PORT VASCULAR ACCESS Right 8/10/2017    Procedure: INSERT PORT VASCULAR ACCESS;  Single Lumen Right Chest Power Port;  Surgeon: Malena Andrew PA-C;  [facility]     LAPAROSCOPY DIAGNOSTIC (GENERAL) N/A 12/6/2017    Procedure: LAPAROSCOPY DIAGNOSTIC (GENERAL);  Diagnostic Laparoscopy, Exploratory Laparotomy Anesthesia Block ;  Surgeon: Rob Dudley MD;  Location: [facility]     LAPAROTOMY EXPLORATORY N/A 12/6/2017     Procedure: LAPAROTOMY EXPLORATORY;;  Surgeon: Rob Dudley MD;  Location: [facility]     ORTHOPEDIC SURGERY Left     HIP ARTHROPLASTY     PICC INSERTION Right 02/23/2018    5Fr DL BioFlo PICC, 44cm (3cm external) in the R basilic vein w/ tip in the SVC RA junction      Left hip replacement.     Allergies:  Allergies as of 05/18/2018 - Julius as Reviewed 05/18/2018   Allergen Reaction Noted     Liquid adhesive Rash 03/01/2018     Current Medications:  Current Outpatient Prescriptions   Medication Sig Dispense Refill     amylase-lipase-protease (CREON) 89360 UNITS CPEP Take 2 capsules (72,000 Units) by mouth 3 times daily (with meals) And 1 capsule with each snack tid. 240 capsule 3     fentaNYL (DURAGESIC) 25 mcg/hr 72 hr patch Place 1 patch onto the skin every 72 hours remove old patch. 10 patch 0     Ferrous Sulfate (IRON SUPPLEMENT PO) Take 325 mg by mouth 2 times daily (with meals)        LORazepam (ATIVAN) 0.5 MG tablet Take 1 tablet (0.5 mg) by mouth every 4 hours as needed (Anxiety, Nausea/Vomiting or Sleep) 30 tablet 5     Multiple Vitamins-Minerals (MULTIVITAMIN & MINERAL PO) Take 1 tablet by mouth daily       omeprazole (PRILOSEC) 20 MG CR capsule Take 1 capsule (20 mg) by mouth daily 30 capsule 11     ondansetron (ZOFRAN-ODT) 8 MG ODT tab Take 1 tablet (8 mg) by mouth every 8 hours as needed for nausea 60 tablet 5     opium tincture 10 MG/ML (1%) liquid Take by mouth every 4 hours as needed for diarrhea or moderate pain       oxyCODONE (OXYCONTIN) 10 MG 12 hr tablet Take 2 tablets (20 mg) by mouth every 12 hours 120 tablet 0     oxyCODONE IR (ROXICODONE) 10 MG tablet Take 0.5-1 tablets (5-10 mg) by mouth every 4 hours as needed for moderate to severe pain 90 tablet 0     parenteral nutrition - PTA/DISCHARGE ORDER Inject into the vein daily See medication history note from 2/20/18 for most recent TPN formula.       Psyllium (METAMUCIL FIBER) 51.7 % PACK Take 1 packet by mouth 3 times daily 90 each  "3     dronabinol (MARINOL) 5 MG capsule Take 1 capsule (5 mg) by mouth 2 times daily (before meals) (Patient not taking: Reported on 5/18/2018) 60 capsule 0     naloxone (NARCAN) nasal spray Spray 1 spray (4 mg) into one nostril alternating nostrils as needed for opioid reversal every 2-3 minutes until assistance arrives (Patient not taking: Reported on 5/3/2018) 0.2 mL 0     Potassium Chloride 40 MEQ/15ML (20%) SOLN Take 7.5 mLs (20 mEq) by mouth daily 52.5 mL 0     Physical Exam:  General: The patient is a pleasant male in no acute distress.  /73 (BP Location: Left arm, Patient Position: Sitting, Cuff Size: Adult Regular)  Pulse 105  Temp 99.4  F (37.4  C) (Oral)  Resp 16  Ht 1.753 m (5' 9.02\")  Wt 68 kg (150 lb)  SpO2 99%  BMI 22.14 kg/m2  Wt Readings from Last 10 Encounters:   05/18/18 68 kg (150 lb)   05/03/18 67.6 kg (149 lb)   04/19/18 65.2 kg (143 lb 11.2 oz)   04/05/18 65.3 kg (144 lb)   03/29/18 65 kg (143 lb 3.2 oz)   03/22/18 67.6 kg (149 lb)   03/19/18 72.2 kg (159 lb 3.2 oz)   03/08/18 69.2 kg (152 lb 8 oz)   03/01/18 68.5 kg (151 lb 1.6 oz)   02/25/18 69.1 kg (152 lb 6.4 oz)   HEENT: EOMI, PERRL. Sclerae are anicteric. Oral mucosa is pink and moist with no lesions or thrush.   Lymph: Neck is supple with no lymphadenopathy in the cervical or supraclavicular areas.   Heart: Regular rate and rhythm.   Lungs: Clear to auscultation bilaterally.   Abdomen: Bowel sounds present, soft, mild central abdominal tenderness with central firmness, ostomy bag is in right abdomen with liquid green stool.  Extremities: No lower extremity edema noted bilaterally.   Neuro: Cranial nerves II through XII are grossly intact.  Skin: PICC line is in place in right arm.  No rashes or lesions on exposed skin.     Laboratory/Imaging Studies   5/18/2018 09:34   Sodium 131 (L)   Potassium 3.6   Chloride 97   Carbon Dioxide 24   Urea Nitrogen 28   Creatinine 0.94   GFR Estimate 83   GFR Estimate If Black >90   Calcium " 9.1   Anion Gap 10   Magnesium 2.3   Phosphorus 4.0   Albumin 2.8 (L)   Protein Total 8.3   Bilirubin Total 0.4   Alkaline Phosphatase 207 (H)   ALT 61   AST 46 (H)   Glucose 124 (H)   WBC 12.1 (H)   Hemoglobin 10.2 (L)   Hematocrit 32.1 (L)   Platelet Count 324   RBC Count 3.69 (L)   MCV 87   MCH 27.6   MCHC 31.8   RDW 17.5 (H)   Diff Method Automated Method   % Neutrophils 65.9   % Lymphocytes 14.4   % Monocytes 14.7   % Eosinophils 4.4   % Basophils 0.3   % Immature Granulocytes 0.3   Nucleated RBCs 0   Absolute Neutrophil 8.0   Absolute Lymphocytes 1.8   Absolute Monocytes 1.8 (H)   Absolute Eosinophils 0.5   Absolute Basophils 0.0   Abs Immature Granulocytes 0.0   Absolute Nucleated RBC 0.0     NGS PANEL COLORECTAL    No mutations were identified in analyzed regions of KRAS, NRAS, BRAF, HRAS, or PIK3CA.     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  The sigmoid carcinoma is obstructing, requiring exploratory laparotomy and end ileostomy along with small bowel resection.    MSI intact and NGS panel does not reveal any identifiable mutations   He has been started on FOLFOX palliative chemotherapy. After 6 cycles, he had stable disease  On 12/6/17, he had Diagnostic laparoscopy and Exploratory laparotomy, but HIPEC was not performed as Peritoneal cancer index was 26 with diffuse involvement of the small bowel as well as the small bowel mesentery.   Repeat CT scan showed worsening peritoneal carcinomatosis and he is getting more symptomatic in terms of worsening abdominal pain and difficulty eating because of worsening abdominal cramping. He was admitted to the hospital with worsening colon distention and colon stent was placed which improved his symptoms. Now he is doing much better.   Plan was to resume FOLFOX, but then he was hospitalized with infectious colitis and bacteremia. He recovered well from this and resumed FOLFOX on 3/8/18. Because of previous neuropathy,  the dose of oxaliplatin was decreased to 70 mg/m . We may consider adding Avatin or anti-EGFR therapy in the future as well.     He was tolerating FOLFOX fairly well. However, cycle 3 was worse than the previous two due to fatigue, worsening neuropathy, and increasing ostomy output, so treatment was held due to patient preference. CT CAP on 5/3/18 showed mild improvement in his disease. Due to his progressive symptoms, the 5-FU bolus was held beginning with cycle 10. He is doing well today and will continue with cycle 11 FOLFOX today. He will follow up every 2 weeks prior to each cycle of chemotherapy and will have repeat imaging the end of June. He will call sooner for concerns.     Abdominal pain due to peritoneal carcinomatosis and colon obstruction. This is much better after placement of another colonic stent. Pain was initially worse with switching from OxyContin 20 mg bid to Fentanyl 12 mcg/hr. The increase to Fentanyl 25 mcg/hr on 4/3/18 has significantly helped his pain. He will continue with oxycodone prn, currently 10 mg bid.    FEN. Patient remains on 12 hours TPN/day and is eating as tolerated. His weight is fairly stable. He has not found a benefit from 5 mg bid Marinol, but has not taken it consistently. Discussed trying to take it more consistently. If still no benefit, could further increase the dose.  Hyponatremia. Recommend doing 1L IV NS daily at home.    Hypokalemia. Resolved. Will continue to monitor.  Renal insufficiency. Resolved. He will continue with daily IVF at home.     History of iron deficient anemia. Patient received IV iron in the August 2018. Hemoglobin has stabilized. Iron studies in early March 2018 show replete iron stores. He will continue on oral iron.    High output ostomy. Will continue with tincture of opium qid.     Peripheral neuropathy. Stable. Secondary to oxaliplatin.  Will try acupuncture in June.     Linda Alves PA-C  [facility]  [address] [place] [address]  [phone]

## 2018-05-22 NOTE — TELEPHONE ENCOUNTER
05/22/18-Acupuncture Benefits- Acupuncture is an exclusion in his [employer] plan. Will not be covered at all. LM FOR THE PT TO CALL ME BACK REGARDING HIS LACK OF COVERAGE.  05/23/18- SPOKE TO THE PATIENT AND ADVISED HIM OF THE LACK OF COVERAGE. HE ASKED THAT I GET THE APT CANCELLED. CALLED [employer] AND THEY HELPED CANCEL..

## 2018-05-24 NOTE — PROGRESS NOTES
This is a recent snapshot of the patient's [facility] medical record.  For current drug dose and complete information and questions, call [phone] or In [facility] pool, fv home infusion ([id number])  CSN Number:  [id number]

## 2018-05-24 NOTE — PROGRESS NOTES
This is a recent snapshot of the patient's [facility] medical record.  For current drug dose and complete information and questions, call [phone] or In Basket pool, fv home infusion ([id number])  CSN Number:  [id number]

## 2018-05-30 NOTE — PROGRESS NOTES
This is a recent snapshot of the patient's [facility] medical record.  For current drug dose and complete information and questions, call [phone] or In Basket pool, fv home infusion ([id number])  CSN Number:  [id number]

## 2018-05-30 NOTE — PROGRESS NOTES
This is a recent snapshot of the patient's [facility] medical record.  For current drug dose and complete information and questions, call [phone] or In Basket pool, fv home infusion ([id number])  CSN Number:  [id number]

## 2018-05-31 NOTE — NURSING NOTE
"Oncology Rooming Note    May 31, 2018 10:44 AM   Sebas Lopez is a 52 year old male who presents for:    Chief Complaint   Patient presents with     Blood Draw     Labs drawn via PICC by RN. Lines flushed and hep locked. Blood return noted from each lument of PICC, unable to obtain enough for second lab tube (CBC), so done via  by RN. Both lumens flushed and purple lumen hep locked. VS taken.     Oncology Clinic Visit     Colon Cancer     Initial Vitals: /70 (BP Location: Left arm, Patient Position: Sitting, Cuff Size: Adult Regular)  Pulse 103  Temp 98.2  F (36.8  C) (Oral)  Resp 18  Ht 1.753 m (5' 9\")  Wt 68.1 kg (150 lb 3.2 oz)  SpO2 93%  BMI 22.18 kg/m2 Estimated body mass index is 22.18 kg/(m^2) as calculated from the following:    Height as of this encounter: 1.753 m (5' 9\").    Weight as of this encounter: 68.1 kg (150 lb 3.2 oz). Body surface area is 1.82 meters squared.  Mild Pain (2) Comment: Data Unavailable   No LMP for male patient.  Allergies reviewed: Yes  Medications reviewed: Yes    Medications: Medication refills not needed today.  Pharmacy name entered into [facility]: [facility] - [place] - [address]    Clinical concerns: No New Concerns    5 minutes for nursing intake (face to face time)     BRE Granda      "

## 2018-05-31 NOTE — MR AVS SNAPSHOT
After Visit Summary   5/31/2018    Sebas Lopez    MRN: [id number]           Patient Information     Date Of Birth          1965        Visit Information        Provider Department      5/31/2018 10:20 AM Linda Alves PA-C [facility]        Today's Diagnoses     Cancer of sigmoid colon (H)    -  1    Peritoneal carcinomatosis (H)           Follow-ups after your visit        Your next 10 appointments already scheduled     Jun 13, 2018  2:45 PM CDT   [facility] Draw with [facility] DRAW   [facility]    [address]  [address]  [facility] [address]   [phone]            Jun 13, 2018  3:20 PM CDT   (Arrive by 3:05 PM)   Return Visit with Linda Alves PA-C   [facility] ([facility])    [address]  [address]  [facility] [address]   [phone]            Jun 14, 2018  9:30 AM CDT   Infusion 240 with UC ONCOLOGY INFUSION, UC 27 ATC   [facility] ([facility])    [address]  [address]  [facility] [address]   [phone]            Jun 27, 2018 10:30 AM CDT   [facility] Lab Draw with [facility] LAB DRAW   [facility]    [address]  [address]  [facility] [address]   [phone]            Jun 27, 2018 11:20 AM CDT   CT CHEST/ABDOMEN/PELVIS W CONTRAST with UCCT2   [facility] ([facility])    [address] [place] [address]  1st Floor  [facility] [address]   [phone]           Please bring any scans or X-rays taken at other hospitals, if similar tests were done. Also bring a list of your medicines, including vitamins, minerals and over-the-counter drugs. It is safest to leave personal items at home.  Be sure to tell your doctor:   If you have any allergies.   If there s any  chance you are pregnant.   If you are breastfeeding.  How to prepare:   Do not eat or drink for 2 hours before your exam. If you need to take medicine, you may take it with small sips of water. (We may ask you to take liquid medicine as well.)   Please wear loose clothing, such as a sweat suit or jogging clothes. Avoid snaps, zippers and other metal. We may ask you to undress and put on a hospital gown.  Please arrive 30 minutes early for your CT. Once in the department you might be asked to drink water 15-20 minutes prior to your exam.  If indicated you may be asked to drink an oral contrast in advance of your CT.  If this is the case, the imaging team will let you know or be in contact with you prior to your appointment  Patients over 70 or patients with diabetes or kidney problems:   If you haven t had a blood test (creatinine test) within the last 30 days, the Cardiologist/Radiologist may require you to get this test prior to your exam.  If you have diabetes:   Continue to take your metformin medication on the day of your exam  If you have any questions, please call the Imaging Department where you will have your exam.            Jun 28, 2018  1:00 PM CDT   (Arrive by 12:45 PM)   Return Visit with Albert Long MD   [facility]    [address] [place] [address]  [address]  [facility] [address] [phone]            Jun 28, 2018  1:30 PM CDT   Infusion 240 with UC ONCOLOGY INFUSION, UC 32 ATC   [facility]    [address] [place] [address]  [address]  [facility] [address] [phone]              Who to contact     If you have questions or need follow up information about today's clinic visit or your schedule please contact [facility] directly at [phone].  Normal or non-critical lab and imaging results will be communicated to you by MyChart, letter or phone within 4 business days  "after the clinic has received the results. If you do not hear from us within 7 days, please contact the clinic through [facility] or phone. If you have a critical or abnormal lab result, we will notify you by phone as soon as possible.  Submit refill requests through [employer] or call your pharmacy and they will forward the refill request to us. Please allow 3 business days for your refill to be completed.          Additional Information About Your Visit        Digital Veg[employer]ar[employer]book[employer] Information     [facility] gives you secure access to your electronic health record. If you see a primary care provider, you can also send messages to your care team and make appointments. If you have questions, please call your primary care clinic.  If you do not have a primary care provider, please call [phone] and they will assist you.        Care EveryWhere ID     This is your Care EveryWhere ID. This could be used by other organizations to access your [place] medical records  [id number]        Your Vitals Were     Pulse Temperature Respirations Height Pulse Oximetry BMI (Body Mass Index)    103 98.2  F (36.8  C) (Oral) 18 1.753 m (5' 9\") 93% 22.18 kg/m2       Blood Pressure from Last 3 Encounters:   05/31/18 104/70   05/18/18 103/73   05/04/18 110/71    Weight from Last 3 Encounters:   05/31/18 68.1 kg (150 lb 3.2 oz)   05/18/18 68 kg (150 lb)   05/03/18 67.6 kg (149 lb)              Today, you had the following     No orders found for display       Primary Care Provider Office Phone # Fax #    Jaguar Becker [phone]       [facility] [address]  [facility] [address]        Equal Access to Services     [facility] : Hadii karrie Funk, lynetteda josemanuel, qaybbhargav lam. So [facility] [phone].    ATENCIÓN: Si habla español, tiene a cid disposición servicios gratuitos de asistencia lingüística. Llame al [phone].    We comply with applicable federal civil " rights laws and [place] laws. We do not discriminate on the basis of race, color, national origin, age, disability, sex, sexual orientation, or gender identity.            Thank you!     Thank you for choosing [facility]  for your care. Our goal is always to provide you with excellent care. Hearing back from our patients is one way we can continue to improve our services. Please take a few minutes to complete the written survey that you may receive in the mail after your visit with us. Thank you!             Your Updated Medication List - Protect others around you: Learn how to safely use, store and throw away your medicines at www.disposemymeds.org.          This list is accurate as of 5/31/18 12:51 PM.  Always use your most recent med list.                   Brand Name Dispense Instructions for use Diagnosis    amoxicillin 500 MG capsule    AMOXIL          amylase-lipase-protease 47956 units Cpep    CREON    240 capsule    Take 2 capsules (72,000 Units) by mouth 3 times daily (with meals) And 1 capsule with each snack tid.    Diarrhea, unspecified type, Peritoneal carcinomatosis (H), Cancer of sigmoid colon (H)       diphenoxylate-atropine 2.5-0.025 MG per tablet    LOMOTIL          dronabinol 5 MG capsule    MARINOL    60 capsule    Take 1 capsule (5 mg) by mouth 2 times daily (before meals)    Peritoneal carcinomatosis (H), Anorexia       fentaNYL 25 mcg/hr 72 hr patch    DURAGESIC    10 patch    Place 1 patch onto the skin every 72 hours remove old patch.    Cancer of sigmoid colon (H)       IRON SUPPLEMENT PO      Take 325 mg by mouth 2 times daily (with meals)        LORazepam 0.5 MG tablet    ATIVAN    30 tablet    Take 1 tablet (0.5 mg) by mouth every 4 hours as needed (Anxiety, Nausea/Vomiting or Sleep)    Peritoneal carcinomatosis (H), Cancer of sigmoid colon (H)       metroNIDAZOLE 500 MG tablet    FLAGYL          MULTIVITAMIN & MINERAL PO      Take 1 tablet by mouth daily         naloxone nasal spray    NARCAN    0.2 mL    Spray 1 spray (4 mg) into one nostril alternating nostrils as needed for opioid reversal every 2-3 minutes until assistance arrives    Cancer of sigmoid colon (H), Long term current use of opiate analgesic       omeprazole 20 MG CR capsule    priLOSEC    30 capsule    Take 1 capsule (20 mg) by mouth daily    Diarrhea, unspecified type       ondansetron 8 MG ODT tab    ZOFRAN-ODT    60 tablet    Take 1 tablet (8 mg) by mouth every 8 hours as needed for nausea    Nausea       ondansetron 8 MG tablet    ZOFRAN          * opium tincture 10 MG/ML (1%) liquid      Take by mouth every 4 hours as needed for diarrhea or moderate pain        * opium tincture 10 MG/ML (1%) liquid     72 mL    Take 0.6 mLs (6 mg) by mouth 4 times daily    Cancer of sigmoid colon (H), Diarrhea, unspecified type       oxyCODONE IR 10 MG tablet    ROXICODONE    90 tablet    Take 0.5-1 tablets (5-10 mg) by mouth every 4 hours as needed for moderate to severe pain    Cancer of sigmoid colon (H), Peritoneal carcinomatosis (H)       parenteral nutrition - PTA/DISCHARGE ORDER      Inject into the vein daily See medication history note from 2/20/18 for most recent TPN formula.        Potassium Chloride 40 MEQ/15ML (20%) Soln     52.5 mL    Take 7.5 mLs (20 mEq) by mouth daily        Psyllium 51.7 % Pack    METAMUCIL FIBER    90 each    Take 1 packet by mouth 3 times daily    Diarrhea, unspecified type       * Notice:  This list has 2 medication(s) that are the same as other medications prescribed for you. Read the directions carefully, and ask your doctor or other care provider to review them with you.

## 2018-05-31 NOTE — PATIENT INSTRUCTIONS
Contact Numbers  [facility]: [phone]    After Hours:  [phone]  Triage: [phone]    Please call the [facility] Triage line if you experience a temperature greater than or equal to 100.5, shaking chills, have uncontrolled nausea, vomiting and/or diarrhea, dizziness, shortness of breath, chest pain, bleeding, unexplained bruising, or if you have any other new/concerning symptoms, questions or concerns.     If it is after hours, weekends, or holidays, please call the main hospital  at  [phone] and ask to speak to the Oncology doctor on call.     If you are having any concerning symptoms or wish to speak to a provider before your next infusion visit, please call your care coordinator or triage to notify them so we can adequately serve you.     If you need a refill on a narcotic prescription or other medication, please call triage before your infusion appointment.         May 2018   [place] Monday Tuesday Wednesday Thursday Friday Saturday             1     2     3     [facility] LAB DRAW   10:15 AM   (15 min.)   [facility] LAB DRAW   [facility] Lab Draw     CT CHEST/ABDOMEN/PELVIS W   11:00 AM   (20 min.)   UCCT1   [facility]     [facility] RETURN    1:15 PM   (30 min.)   Albert Long MD   [facility]     LAB    4:30 PM   (15 min.)    LAB   [facility] Lab 4     [facility] ONC INFUSION 240   10:00 AM   (240 min.)    ONCOLOGY INFUSION   [facility] 5       6     7     8     9     10     11     12       13     14     15     16     17     18     [facility]    9:00 AM   (15 min.)   [facility] LAB DRAW   [facility] Lab Draw     [facility] RETURN    9:15 AM   (50 min.)   Linda Alves PA-C   [facility] ONC INFUSION 240   11:30 AM   (240 min.)    ONCOLOGY INFUSION   [facility] 19       20     21     22     23     24     25     26       27     28     29     30     31     [facility]  [facility] LAB DRAW    9:45 AM   (15 min.)    [facility]   [facility]     [facility] RETURN   10:05 AM   (50 min.)   Linda Alves PA-C   [facility]     [facility] ONC INFUSION 240   11:30 AM   (240 min.)   UC ONCOLOGY INFUSION   [facility]                      June 2018 Sunday Monday Tuesday Wednesday Thursday Friday Saturday                            1     2  [facility] DC  @12:00     3     4     5     6     7     8     9       10     11     12     13     14     15  Happy Birthday!     [facility] LAB DRAW    9:30 AM   (15 min.)    [facility] LAB DRAW   [facility] Lab Draw     [facility] RETURN    9:45 AM   (50 min.)   Linda Alves PA-C   [facility]     [facility] ONC INFUSION 240   11:00 AM   (240 min.)   UC ONCOLOGY INFUSION   [facility] 16       17     18     19     20     21     22     23       24     25     26     27     [facility] LAB DRAW   10:30 AM   (15 min.)    [facility] LAB DRAW   [facility] Lab Draw     CT CHEST/ABDOMEN/PELVIS W   11:20 AM   (20 min.)   UCCT2   [facility] 28     [facility] RETURN   12:45 PM   (30 min.)   Albert Long MD   [facility]    1:30 PM   (240 min.)   [facility] ONCOLOGY INFUSION   [facility] 29     30                 Recent Results (from the past 24 hour(s))   Magnesium    Collection Time: 05/31/18 10:30 AM   Result Value Ref Range    Magnesium 2.3 1.6 - 2.3 mg/dL   Phosphorus    Collection Time: 05/31/18 10:30 AM   Result Value Ref Range    Phosphorus 3.7 2.5 - 4.5 mg/dL   CBC with platelets differential    Collection Time: 05/31/18 10:30 AM   Result Value Ref Range    WBC 11.5 (H) 4.0 - 11.0 10e9/L    RBC Count 3.88 (L) 4.4 - 5.9 10e12/L    Hemoglobin 10.9 (L) 13.3 - 17.7 g/dL    Hematocrit 33.0 (L) 40.0 - 53.0 %    MCV 85 78 - 100 fl    MCH 28.1 26.5 - 33.0 pg    MCHC 33.0 31.5 - 36.5 g/dL    RDW 17.4 (H) 10.0 -  15.0 %    Platelet Count 369 150 - 450 10e9/L    Diff Method Automated Method     % Neutrophils 65.7 %    % Lymphocytes 17.1 %    % Monocytes 13.4 %    % Eosinophils 3.1 %    % Basophils 0.4 %    % Immature Granulocytes 0.3 %    Nucleated RBCs 0 0 /100    Absolute Neutrophil 7.5 1.6 - 8.3 10e9/L    Absolute Lymphocytes 2.0 0.8 - 5.3 10e9/L    Absolute Monocytes 1.5 (H) 0.0 - 1.3 10e9/L    Absolute Eosinophils 0.4 0.0 - 0.7 10e9/L    Absolute Basophils 0.1 0.0 - 0.2 10e9/L    Abs Immature Granulocytes 0.0 0 - 0.4 10e9/L    Absolute Nucleated RBC 0.0     Platelet Estimate Confirming automated cell count    Comprehensive metabolic panel    Collection Time: 05/31/18 10:30 AM   Result Value Ref Range    Sodium 132 (L) 133 - 144 mmol/L    Potassium 4.0 3.4 - 5.3 mmol/L    Chloride 99 94 - 109 mmol/L    Carbon Dioxide 25 20 - 32 mmol/L    Anion Gap 9 3 - 14 mmol/L    Glucose 105 (H) 70 - 99 mg/dL    Urea Nitrogen 33 (H) 7 - 30 mg/dL    Creatinine 1.05 0.66 - 1.25 mg/dL    GFR Estimate 73 >60 mL/min/1.7m2    GFR Estimate If Black 89 >60 mL/min/1.7m2    Calcium 9.1 8.5 - 10.1 mg/dL    Bilirubin Total 0.2 0.2 - 1.3 mg/dL    Albumin 2.8 (L) 3.4 - 5.0 g/dL    Protein Total 8.3 6.8 - 8.8 g/dL    Alkaline Phosphatase 228 (H) 40 - 150 U/L    ALT 50 0 - 70 U/L    AST 43 0 - 45 U/L

## 2018-05-31 NOTE — Clinical Note
5/31/2018       RE: Sebas Lopez  [address] [place] [address]     Dear Colleague,    Thank you for referring your patient, Sebas Lopez, to the [facility]. Please see a copy of my visit note below.    Oncology follow up visit:  Date on this visit: May 31, 2018    CC  sigmoid adenocarcinoma with peritoneal carcinomatosis    Primary Physician: Waylon Han     History Of Present Illness:     Please see previous note for details. I have copied and updated from prior notes.   Sebas is a 52 year old male who has a history of ulcerative colitis diagnosed more than 20 years ago, but he has not had medical management for that.  He was doing well, but in 05/2017 he presented with a few weeks of abdominal pain.  At that time, his imaging showed that he had a sigmoid wall thickening with adjacent mesenteric lymphadenopathy and free fluid near the abdominal wall mesh from his prior hernia surgery.  He subsequently underwent a colonoscopy which was incomplete and showed an obstructing sigmoid colon mass.  The biopsy from the sigmoid mass showed sigmoid adenocarcinoma.  He then developed obstructive symptoms and underwent exploratory laparotomy on 07/10/2017.  During that he was found to have diffuse peritoneal carcinomatosis.  Extensive lysis of adhesions was performed and a small bowel resection was performed with end ileostomy.  The biopsies from the multiple large peritoneal metastasis also were positive for metastatic adenocarcinoma. We still haven't received testing on MSI or NGS panel back     He started palliative FOLFOX on 8/11/17. C#6 given on 10/26/17  After 6 cycles, he has stable disease    On 12/6/17, he had Diagnostic laparoscopy and Exploratory laparotomy, but HIPEC was not performed as Peritoneal cancer index was 26 with diffuse involvement of the small bowel as well as the small bowel mesentery.     He then presented to ED on 1/26 with abdominal pain and  associated nausea. CT A/P on 1/26 with 5.2 x 4.5 x 3.6 cm proximal sigmoid mass causnig colonic obstriction with singificant distention of cecum (7cm in diameter), ascending colon and proximal transverse colon. No pneumatosis or pneumoperitoneum. Also small volume of ascites with associated findings concerning for peritoneal carcinomatosis, increased from prior studies. Dr. Dudley was consulted and recommended no surgical intervention.    He was then seen in clinic on 1/29/2018 due to inability to tolerate solid foods and he was getting abdominal cramping and some nausea. He also noticed that his ostomy output decreased. He was given IV fluids as well as antibiotics and was started on OxyContin 10 mg twice a day along with p.r.n. oxycodone. Of note a few weeks prior he was started on short acting octreotide to decrease the ostomy output but he stopped taking it due to worsening of the abdominal cramping. A colonic stent was placed on 2/6/18.    He was recently hospitalized from 2/20-2/25/18 with presumed infectious colitis and bacteremia. His port was removed. He was discharged home on IV vancomycin. He resumed FOLFOX with dose reduced oxaliplatin due to previous neuropathy on 3/8/18. He was hospitalized with a bowel obstruction from 3/18-3/20/18 and a colonic stent was placed on 3/19/18. He comes in today for routine follow up prior to his next cycle of FOLFOX.    Interim history      Review of Systems  Patient denies any of the following except if noted above: chills, vision or hearing changes, chest pain, dyspnea, nausea, vomiting, constipation, urinary concerns, or issues with mood.     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     Ulcerative colitis (H)      Past Surgical History:   Procedure Laterality Date     COLONOSCOPY  2017     COLONOSCOPY N/A 11/30/2017    Procedure: COLONOSCOPY;  Colonoscopy;  Surgeon: Rob Dudley MD;  Location: [facility]     COLONOSCOPY N/A 2/6/2018    Procedure: COMBINED COLONOSCOPY, STENT PLACEMENT;  COMBINED COLONOSCOPY with Colonic Stent Placement;  Surgeon: Guru Lionel Mar MD;  Location: [facility]     COLONOSCOPY N/A 3/19/2018    Procedure: COMBINED COLONOSCOPY, STENT PLACEMENT;  flexible sigmoidoscopy with stent placement and dilation;  Surgeon: Alexis Rios MD;  Location: [facility]     GI SURGERY  07/10/2017    Extensive lysis of adhesions, small bowel resection with end ileostomy.      HERNIA REPAIR       INSERT PORT VASCULAR ACCESS Right 8/10/2017    Procedure: INSERT PORT VASCULAR ACCESS;  Single Lumen Right Chest Power Port;  Surgeon: Malena Andrew PA-C;  Location: [facility]     LAPAROSCOPY DIAGNOSTIC (GENERAL) N/A 12/6/2017    Procedure: LAPAROSCOPY DIAGNOSTIC (GENERAL);  Diagnostic Laparoscopy, Exploratory Laparotomy Anesthesia Block ;  Surgeon: Rob Dudley MD;  Location: [facility]     LAPAROTOMY EXPLORATORY N/A 12/6/2017    Procedure: LAPAROTOMY EXPLORATORY;;  Surgeon: Rob Dudley MD;  Location: [facility]     ORTHOPEDIC SURGERY Left     HIP ARTHROPLASTY     PICC INSERTION Right 02/23/2018    5Fr DL BioFlo PICC, 44cm (3cm external) in the R basilic vein w/ tip in the SVC RA junction      Left hip replacement.     Allergies:  Allergies as of 05/31/2018 - Julius as Reviewed 05/18/2018   Allergen Reaction Noted     Liquid adhesive Rash 03/01/2018     Current Medications:  Current Outpatient Prescriptions   Medication Sig Dispense Refill     amylase-lipase-protease (CREON) 75858 UNITS CPEP Take 2 capsules (72,000 Units) by mouth 3 times daily (with meals) And 1 capsule with each snack tid. 240 capsule 3     dronabinol (MARINOL)  5 MG capsule Take 1 capsule (5 mg) by mouth 2 times daily (before meals) (Patient not taking: Reported on 5/18/2018) 60 capsule 0     fentaNYL (DURAGESIC) 25 mcg/hr 72 hr patch Place 1 patch onto the skin every 72 hours remove old patch. 10 patch 0     Ferrous Sulfate (IRON SUPPLEMENT PO) Take 325 mg by mouth 2 times daily (with meals)        LORazepam (ATIVAN) 0.5 MG tablet Take 1 tablet (0.5 mg) by mouth every 4 hours as needed (Anxiety, Nausea/Vomiting or Sleep) 30 tablet 5     Multiple Vitamins-Minerals (MULTIVITAMIN & MINERAL PO) Take 1 tablet by mouth daily       naloxone (NARCAN) nasal spray Spray 1 spray (4 mg) into one nostril alternating nostrils as needed for opioid reversal every 2-3 minutes until assistance arrives (Patient not taking: Reported on 5/3/2018) 0.2 mL 0     omeprazole (PRILOSEC) 20 MG CR capsule Take 1 capsule (20 mg) by mouth daily 30 capsule 11     ondansetron (ZOFRAN-ODT) 8 MG ODT tab Take 1 tablet (8 mg) by mouth every 8 hours as needed for nausea 60 tablet 5     opium tincture 10 MG/ML (1%) liquid Take by mouth every 4 hours as needed for diarrhea or moderate pain       opium tincture 10 MG/ML (1%) liquid Take 0.6 mLs (6 mg) by mouth 4 times daily 72 mL 0     oxyCODONE IR (ROXICODONE) 10 MG tablet Take 0.5-1 tablets (5-10 mg) by mouth every 4 hours as needed for moderate to severe pain 90 tablet 0     parenteral nutrition - PTA/DISCHARGE ORDER Inject into the vein daily See medication history note from 2/20/18 for most recent TPN formula.       Potassium Chloride 40 MEQ/15ML (20%) SOLN Take 7.5 mLs (20 mEq) by mouth daily 52.5 mL 0     Psyllium (METAMUCIL FIBER) 51.7 % PACK Take 1 packet by mouth 3 times daily 90 each 3     Physical Exam:  General: The patient is a pleasant male in no acute distress.  /70 (BP Location: Left arm, Patient Position: Sitting, Cuff Size: Adult Regular)  Pulse 103  Temp 98.2  F (36.8  C) (Oral)  Resp 18  Wt 68.1 kg (150 lb 3.2 oz)  SpO2 93%  BMI  22.17 kg/m2  Wt Readings from Last 10 Encounters:   05/31/18 68.1 kg (150 lb 3.2 oz)   05/18/18 68 kg (150 lb)   05/03/18 67.6 kg (149 lb)   04/19/18 65.2 kg (143 lb 11.2 oz)   04/05/18 65.3 kg (144 lb)   03/29/18 65 kg (143 lb 3.2 oz)   03/22/18 67.6 kg (149 lb)   03/19/18 72.2 kg (159 lb 3.2 oz)   03/08/18 69.2 kg (152 lb 8 oz)   03/01/18 68.5 kg (151 lb 1.6 oz)   HEENT: EOMI, PERRL. Sclerae are anicteric. Oral mucosa is pink and moist with no lesions or thrush.   Lymph: Neck is supple with no lymphadenopathy in the cervical or supraclavicular areas.   Heart: Regular rate and rhythm.   Lungs: Clear to auscultation bilaterally.   Abdomen: Bowel sounds present, soft, mild central abdominal tenderness with central firmness, ostomy bag is in right abdomen with liquid green stool.  Extremities: No lower extremity edema noted bilaterally.   Neuro: Cranial nerves II through XII are grossly intact.  Skin: PICC line is in place in right arm.  No rashes or lesions on exposed skin.     Laboratory/Imaging Studies    NGS PANEL COLORECTAL    No mutations were identified in analyzed regions of KRAS, NRAS, BRAF, HRAS, or PIK3CA.     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  The sigmoid carcinoma is obstructing, requiring exploratory laparotomy and end ileostomy along with small bowel resection.    MSI intact and NGS panel does not reveal any identifiable mutations   He has been started on FOLFOX palliative chemotherapy. After 6 cycles, he had stable disease  On 12/6/17, he had Diagnostic laparoscopy and Exploratory laparotomy, but HIPEC was not performed as Peritoneal cancer index was 26 with diffuse involvement of the small bowel as well as the small bowel mesentery.   Repeat CT scan showed worsening peritoneal carcinomatosis and he is getting more symptomatic in terms of worsening abdominal pain and difficulty eating because of worsening abdominal cramping. He was admitted to the  hospital with worsening colon distention and colon stent was placed which improved his symptoms. Now he is doing much better.   Plan was to resume FOLFOX, but then he was hospitalized with infectious colitis and bacteremia. He recovered well from this and resumed FOLFOX on 3/8/18. Because of previous neuropathy, the dose of oxaliplatin was decreased to 70 mg/m . We may consider adding Avatin or anti-EGFR therapy in the future as well.     He was tolerating FOLFOX fairly well. However, cycle 3 was worse than the previous two due to fatigue, worsening neuropathy, and increasing ostomy output, so treatment was held due to patient preference. CT CAP on 5/3/18 showed mild improvement in his disease. Due to his progressive symptoms, the 5-FU bolus was held beginning with cycle 10. He is doing well today and will continue with cycle 11 FOLFOX today. He will follow up every 2 weeks prior to each cycle of chemotherapy and will have repeat imaging the end of June. He will call sooner for concerns.     Abdominal pain due to peritoneal carcinomatosis and colon obstruction. This is much better after placement of another colonic stent. Pain was initially worse with switching from OxyContin 20 mg bid to Fentanyl 12 mcg/hr. The increase to Fentanyl 25 mcg/hr on 4/3/18 has significantly helped his pain. He will continue with oxycodone prn, currently 10 mg bid.    FEN. Patient remains on 12 hours TPN/day and is eating as tolerated. His weight is fairly stable. He has not found a benefit from 5 mg bid Marinol, but has not taken it consistently. Discussed trying to take it more consistently. If still no benefit, could further increase the dose.  Hyponatremia. Recommend doing 1L IV NS daily at home.    Hypokalemia. Resolved. Will continue to monitor.  Renal insufficiency. Resolved. He will continue with daily IVF at home.     History of iron deficient anemia. Patient received IV iron in the August 2018. Hemoglobin has stabilized. Iron  studies in early March 2018 show replete iron stores. He will continue on oral iron.    High output ostomy. Will continue with tincture of opium qid.     Peripheral neuropathy. Stable. Secondary to oxaliplatin.  Will try acupuncture in June.     Linda Alves PA-C  [facility] [address] [id number] [phone]               Again, thank you for allowing me to participate in the care of your patient.      Sincerely,    Linda Alves PA-C

## 2018-05-31 NOTE — MR AVS SNAPSHOT
After Visit Summary   5/31/2018    Sebas Lopez    MRN: [id number]           Patient Information     Date Of Birth          1965        Visit Information        Provider Department      5/31/2018 11:30 AM  27 ATC;  ONCOLOGY INFUSION [facility]        Today's Diagnoses     Peritoneal carcinomatosis (H)    -  1    Cancer of sigmoid colon (H)          Care Instructions    Contact Numbers  [facility]: [phone]    After Hours:  [phone]  Triage: [phone]    Please call the [facility] Triage line if you experience a temperature greater than or equal to 100.5, shaking chills, have uncontrolled nausea, vomiting and/or diarrhea, dizziness, shortness of breath, chest pain, bleeding, unexplained bruising, or if you have any other new/concerning symptoms, questions or concerns.     If it is after hours, weekends, or holidays, please call the main hospital  at  [phone] and ask to speak to the Oncology doctor on call.     If you are having any concerning symptoms or wish to speak to a provider before your next infusion visit, please call your care coordinator or triage to notify them so we can adequately serve you.     If you need a refill on a narcotic prescription or other medication, please call triage before your infusion appointment.         May 2018   [place] Monday Tuesday Wednesday Thursday Friday Saturday             1     2     3     [facility]   10[facility]15 AM   (15 min.)   [facility]   [facility] Lab Draw     CT CHEST/ABDOMEN/PELVIS W   11:00 AM   (20 min.)   UCCT1   [facility] CT     P RETURN    1:15 PM   (30 min.)   Albert Long MD   [facility]     LAB    4:30 PM   (15 min.)    LAB   [facility] Lab 4     [facility] ONC INFUSION 240   10:00 AM   (240 min.)    ONCOLOGY INFUSION   [facility] 5       6     7     8     9     10     11     12       13     14     15      16     17     18     [facility]    9:00 AM   (15 min.)   [facility] DRAW   [facility] Lab Draw     UMP RETURN    9:15 AM   (50 min.)   Linda Alves PA-C   [facility]     [facility] ONC INFUSION 240   11:30 AM   (240 min.)   UC ONCOLOGY INFUSION   [facility] 19       20     21     22     23     24     25     26       27     28     29     30     31     [facility] DRAW    9:45 AM   (15 min.)   [facility] DRAW   [facility] Lab Draw     UMP RETURN   10:05 AM   (50 min.)   Linda Alves PA-C   [facility]     [facility] ONC INFUSION 240   11:30 AM   (240 min.)   UC ONCOLOGY INFUSION   [facility]                      June 2018 Sunday Monday Tuesday Wednesday Thursday Friday Saturday                            1     2  PUMP DC  @12:00     3     4     5     6     7     8     9       10     11     12     13     14     15  Happy Birthday!     P [facility] LAB DRAW    9:30 AM   (15 min.)    [facility]     [facility] RETURN    9:45 AM   (50 min.)   Linda Alves PA-C   [facility]     [facility] ONC INFUSION 240   11:00 AM   (240 min.)    ONCOLOGY INFUSION   [facility] 16       17     18     19     20     21     22     23       24     25     26     27     [facility] DRAW   10:30 AM   (15 min.)    [facility] LAB DRAW   [facility] Lab Draw     CT CHEST/ABDOMEN/PELVIS W   11:20 AM   (20 min.)   UCCT2   [facility] CT 28     UMP RETURN   12:45 PM   (30 min.)   Albert Long MD   [facility]     [facility] ONC INFUSION 240    1:30 PM   (240 min.)   UC ONCOLOGY INFUSION   [facility] 29     30                 Recent Results (from the past 24 hour(s))   Magnesium    Collection Time: 05/31/18 10:30 AM   Result Value Ref Range    Magnesium 2.3 1.6 - 2.3 mg/dL   Phosphorus    Collection Time: 05/31/18  10:30 AM   Result Value Ref Range    Phosphorus 3.7 2.5 - 4.5 mg/dL   CBC with platelets differential    Collection Time: 05/31/18 10:30 AM   Result Value Ref Range    WBC 11.5 (H) 4.0 - 11.0 10e9/L    RBC Count 3.88 (L) 4.4 - 5.9 10e12/L    Hemoglobin 10.9 (L) 13.3 - 17.7 g/dL    Hematocrit 33.0 (L) 40.0 - 53.0 %    MCV 85 78 - 100 fl    MCH 28.1 26.5 - 33.0 pg    MCHC 33.0 31.5 - 36.5 g/dL    RDW 17.4 (H) 10.0 - 15.0 %    Platelet Count 369 150 - 450 10e9/L    Diff Method Automated Method     % Neutrophils 65.7 %    % Lymphocytes 17.1 %    % Monocytes 13.4 %    % Eosinophils 3.1 %    % Basophils 0.4 %    % Immature Granulocytes 0.3 %    Nucleated RBCs 0 0 /100    Absolute Neutrophil 7.5 1.6 - 8.3 10e9/L    Absolute Lymphocytes 2.0 0.8 - 5.3 10e9/L    Absolute Monocytes 1.5 (H) 0.0 - 1.3 10e9/L    Absolute Eosinophils 0.4 0.0 - 0.7 10e9/L    Absolute Basophils 0.1 0.0 - 0.2 10e9/L    Abs Immature Granulocytes 0.0 0 - 0.4 10e9/L    Absolute Nucleated RBC 0.0     Platelet Estimate Confirming automated cell count    Comprehensive metabolic panel    Collection Time: 05/31/18 10:30 AM   Result Value Ref Range    Sodium 132 (L) 133 - 144 mmol/L    Potassium 4.0 3.4 - 5.3 mmol/L    Chloride 99 94 - 109 mmol/L    Carbon Dioxide 25 20 - 32 mmol/L    Anion Gap 9 3 - 14 mmol/L    Glucose 105 (H) 70 - 99 mg/dL    Urea Nitrogen 33 (H) 7 - 30 mg/dL    Creatinine 1.05 0.66 - 1.25 mg/dL    GFR Estimate 73 >60 mL/min/1.7m2    GFR Estimate If Black 89 >60 mL/min/1.7m2    Calcium 9.1 8.5 - 10.1 mg/dL    Bilirubin Total 0.2 0.2 - 1.3 mg/dL    Albumin 2.8 (L) 3.4 - 5.0 g/dL    Protein Total 8.3 6.8 - 8.8 g/dL    Alkaline Phosphatase 228 (H) 40 - 150 U/L    ALT 50 0 - 70 U/L    AST 43 0 - 45 U/L                 Follow-ups after your visit        Your next 10 appointments already scheduled     Juwan 15, 2018  9:30 AM CDT   [facility] Lab Draw with  [facility] LAB DRAW   [facility] Lab Draw ([facility])    [address]  [address]  [place] [address]   [phone]            Juwan 15, 2018 10:00 AM CDT   (Arrive by 9:45 AM)   Return Visit with Linda Alves PA-C   [facility] ([facility])    [address]  [address]  [place] [address]   [phone]            Juwan 15, 2018 11:00 AM CDT   Infusion 240 with UC ONCOLOGY INFUSION, UC 12 ATC   [facility] ([facility])    [address]  [address]  [place] [address]   [phone]            Jun 27, 2018 10:30 AM CDT   [facility] Lab Draw with  [facility] LAB DRAW   [facility] Lab Draw ([facility])    [address]  [address]  [place] [address]   [phone]            Jun 27, 2018 11:20 AM CDT   CT CHEST/ABDOMEN/PELVIS W CONTRAST with UCCT2   [facility] CT ([facility])    [address]  1st [address]  [place] [address]   [phone]           Please bring any scans or X-rays taken at other hospitals, if similar tests were done. Also bring a list of your medicines, including vitamins, minerals and over-the-counter drugs. It is safest to leave personal items at home.  Be sure to tell your doctor:   If you have any allergies.   If there s any chance you are pregnant.   If you are breastfeeding.  How to prepare:   Do not eat or drink for 2 hours before your exam. If you need to take medicine, you may take it with small sips of water. (We may ask you to take liquid medicine as well.)   Please wear loose clothing, such as a sweat suit or jogging clothes. Avoid snaps, zippers and other metal. We may ask you to undress and put on a hospital gown.  Please arrive 30 minutes early for your CT. Once in the department you might be asked to drink water 15-20 minutes prior to your exam.  If indicated you may be asked to drink an oral contrast in advance of your CT.   If this is the case, the imaging team will let you know or be in contact with you prior to your appointment  Patients over 70 or patients with diabetes or kidney problems:   If you haven t had a blood test (creatinine test) within the last 30 days, the Cardiologist/Radiologist may require you to get this test prior to your exam.  If you have diabetes:   Continue to take your metformin medication on the day of your exam  If you have any questions, please call the Imaging Department where you will have your exam.            Jun 28, 2018  1:00 PM CDT   (Arrive by 12:45 PM)   Return Visit with Albert Long MD   [facility] ([facility])    [address] [place] [address]  [address]  [facility] [address] [phone]            Jun 28, 2018  1:30 PM CDT   Infusion 240 with UC ONCOLOGY INFUSION, UC 32 ATC   [facility] ([facility])    [address] [place] [address]  [address]  [facility] [address] [phone]              Who to contact     If you have questions or need follow up information about today's clinic visit or your schedule please contact [facility] directly at [phone].  Normal or non-critical lab and imaging results will be communicated to you by MyChart, letter or phone within 4 business days after the clinic has received the results. If you do not hear from us within 7 days, please contact the clinic through MyChart or phone. If you have a critical or abnormal lab result, we will notify you by phone as soon as possible.  Submit refill requests through [employer] or call your pharmacy and they will forward the refill request to us. Please allow 3 business days for your refill to be completed.          Additional Information About Your Visit        [facility] Information     [employer] gives you secure access to your electronic health record. If you see a primary care provider, you can also send messages to your  care team and make appointments. If you have questions, please call your primary care clinic.  If you do not have a primary care provider, please call [phone] and they will assist you.        Care EveryWhere ID     This is your Care EveryWhere ID. This could be used by other organizations to access your [facility] medical records  [id number]         Blood Pressure from Last 3 Encounters:   05/31/18 104/70   05/18/18 103/73   05/04/18 110/71    Weight from Last 3 Encounters:   05/31/18 68.1 kg (150 lb 3.2 oz)   05/18/18 68 kg (150 lb)   05/03/18 67.6 kg (149 lb)              We Performed the Following     CBC with platelets differential     Comprehensive metabolic panel     Magnesium     Phosphorus        Primary Care Provider Office Phone # Fax #    Jaguar Becker [phone]       [facility] [address]  [facility] [address]        Equal Access to Services     [facility] : Hadii karrie ku hadasho Soshu, waaxda luqadaha, qaybta kaalmada adeegyada, bhargav sage . So [facility] [phone].    ATENCIÓN: Si habla español, tiene a cid disposición servicios gratuitos de asistencia lingüística. Llame al [phone].    We comply with applicable federal civil rights laws and [place] laws. We do not discriminate on the basis of race, color, national origin, age, disability, sex, sexual orientation, or gender identity.            Thank you!     Thank you for choosing [facility] [place]  for your care. Our goal is always to provide you with excellent care. Hearing back from our patients is one way we can continue to improve our services. Please take a few minutes to complete the written survey that you may receive in the mail after your visit with us. Thank you!             Your Updated Medication List - Protect others around you: Learn how to safely use, store and throw away your medicines at www.disposemymeds.org.          This list is accurate as of  5/31/18  2:09 PM.  Always use your most recent med list.                   Brand Name Dispense Instructions for use Diagnosis    amoxicillin 500 MG capsule    AMOXIL          amylase-lipase-protease 92080 units Cpep    CREON    240 capsule    Take 2 capsules (72,000 Units) by mouth 3 times daily (with meals) And 1 capsule with each snack tid.    Diarrhea, unspecified type, Peritoneal carcinomatosis (H), Cancer of sigmoid colon (H)       diphenoxylate-atropine 2.5-0.025 MG per tablet    LOMOTIL          dronabinol 5 MG capsule    MARINOL    60 capsule    Take 1 capsule (5 mg) by mouth 2 times daily (before meals)    Peritoneal carcinomatosis (H), Anorexia       fentaNYL 25 mcg/hr 72 hr patch    DURAGESIC    10 patch    Place 1 patch onto the skin every 72 hours remove old patch.    Cancer of sigmoid colon (H)       IRON SUPPLEMENT PO      Take 325 mg by mouth 2 times daily (with meals)        LORazepam 0.5 MG tablet    ATIVAN    30 tablet    Take 1 tablet (0.5 mg) by mouth every 4 hours as needed (Anxiety, Nausea/Vomiting or Sleep)    Peritoneal carcinomatosis (H), Cancer of sigmoid colon (H)       metroNIDAZOLE 500 MG tablet    FLAGYL          MULTIVITAMIN & MINERAL PO      Take 1 tablet by mouth daily        naloxone nasal spray    NARCAN    0.2 mL    Spray 1 spray (4 mg) into one nostril alternating nostrils as needed for opioid reversal every 2-3 minutes until assistance arrives    Cancer of sigmoid colon (H), Long term current use of opiate analgesic       omeprazole 20 MG CR capsule    priLOSEC    30 capsule    Take 1 capsule (20 mg) by mouth daily    Diarrhea, unspecified type       ondansetron 8 MG ODT tab    ZOFRAN-ODT    60 tablet    Take 1 tablet (8 mg) by mouth every 8 hours as needed for nausea    Nausea       ondansetron 8 MG tablet    ZOFRAN          * opium tincture 10 MG/ML (1%) liquid      Take by mouth every 4 hours as needed for diarrhea or moderate pain        * opium tincture 10 MG/ML (1%)  liquid     72 mL    Take 0.6 mLs (6 mg) by mouth 4 times daily    Cancer of sigmoid colon (H), Diarrhea, unspecified type       oxyCODONE IR 10 MG tablet    ROXICODONE    90 tablet    Take 0.5-1 tablets (5-10 mg) by mouth every 4 hours as needed for moderate to severe pain    Cancer of sigmoid colon (H), Peritoneal carcinomatosis (H)       parenteral nutrition - PTA/DISCHARGE ORDER      Inject into the vein daily See medication history note from 2/20/18 for most recent TPN formula.        Potassium Chloride 40 MEQ/15ML (20%) Soln     52.5 mL    Take 7.5 mLs (20 mEq) by mouth daily        Psyllium 51.7 % Pack    METAMUCIL FIBER    90 each    Take 1 packet by mouth 3 times daily    Diarrhea, unspecified type       * Notice:  This list has 2 medication(s) that are the same as other medications prescribed for you. Read the directions carefully, and ask your doctor or other care provider to review them with you.

## 2018-05-31 NOTE — NURSING NOTE
Chief Complaint   Patient presents with     Blood Draw     Labs drawn via PICC by RN. Lines flushed and hep locked. Blood return noted from each lument of PICC, unable to obtain enough for second lab tube (CBC), so done via  by RN. Both lumens flushed and purple lumen hep locked. VS taken.     Jaylin Hoang RN

## 2018-05-31 NOTE — PROGRESS NOTES
Oncology follow up visit:  Date on this visit: May 31, 2018    CC  sigmoid adenocarcinoma with peritoneal carcinomatosis    Primary Physician: Waylon Han     History Of Present Illness:     Please see previous note for details. I have copied and updated from prior notes.   Sebas is a 52 year old male who has a history of ulcerative colitis diagnosed more than 20 years ago, but he has not had medical management for that.  He was doing well, but in 05/2017 he presented with a few weeks of abdominal pain.  At that time, his imaging showed that he had a sigmoid wall thickening with adjacent mesenteric lymphadenopathy and free fluid near the abdominal wall mesh from his prior hernia surgery.  He subsequently underwent a colonoscopy which was incomplete and showed an obstructing sigmoid colon mass.  The biopsy from the sigmoid mass showed sigmoid adenocarcinoma.  He then developed obstructive symptoms and underwent exploratory laparotomy on 07/10/2017.  During that he was found to have diffuse peritoneal carcinomatosis.  Extensive lysis of adhesions was performed and a small bowel resection was performed with end ileostomy.  The biopsies from the multiple large peritoneal metastasis also were positive for metastatic adenocarcinoma. We still haven't received testing on MSI or NGS panel back     He started palliative FOLFOX on 8/11/17. C#6 given on 10/26/17  After 6 cycles, he has stable disease    On 12/6/17, he had Diagnostic laparoscopy and Exploratory laparotomy, but HIPEC was not performed as Peritoneal cancer index was 26 with diffuse involvement of the small bowel as well as the small bowel mesentery.     He then presented to ED on 1/26 with abdominal pain and associated nausea. CT A/P on 1/26 with 5.2 x 4.5 x 3.6 cm proximal sigmoid mass causnig colonic obstriction with singificant distention of cecum (7cm in diameter), ascending colon and proximal transverse colon. No pneumatosis or pneumoperitoneum. Also  small volume of ascites with associated findings concerning for peritoneal carcinomatosis, increased from prior studies. Dr. Dudley was consulted and recommended no surgical intervention.    He was then seen in clinic on 1/29/2018 due to inability to tolerate solid foods and he was getting abdominal cramping and some nausea. He also noticed that his ostomy output decreased. He was given IV fluids as well as antibiotics and was started on OxyContin 10 mg twice a day along with p.r.n. oxycodone. Of note a few weeks prior he was started on short acting octreotide to decrease the ostomy output but he stopped taking it due to worsening of the abdominal cramping. A colonic stent was placed on 2/6/18.    He was recently hospitalized from 2/20-2/25/18 with presumed infectious colitis and bacteremia. His port was removed. He was discharged home on IV vancomycin. He resumed FOLFOX with dose reduced oxaliplatin due to previous neuropathy on 3/8/18. He was hospitalized with a bowel obstruction from 3/18-3/20/18 and a colonic stent was placed on 3/19/18. He comes in today for routine follow up prior to his next cycle of FOLFOX.    Interim history  Patient reports that he felt sweaty for 2 nights after his last cycle of chemotherapy.  He did not have any fevers during that time and is doing better now.  He denies any change to his neuropathy.  His TPN remains on it 12 hours a day.  He does feel he has been eating better and his appetite has been better.  He is not taking the Marinol currently.  He is thinking about taking it during his first week of chemotherapy.  He reports no change to his loose stools.  He continues to take the tincture of opium 4 times a day.  He continues to use between 1/2-1 L of IV fluids a day.  His abdominal pain is stable with the use of fentanyl and 10 mg of oxycodone 2-3 times per day.  He denies other concerns.    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     Ulcerative colitis (H)      Past Surgical History:   Procedure Laterality Date     COLONOSCOPY  2017     COLONOSCOPY N/A 11/30/2017    Procedure: COLONOSCOPY;  Colonoscopy;  Surgeon: Rob Dudley MD;  Location: [facility]     COLONOSCOPY N/A 2/6/2018    Procedure: COMBINED COLONOSCOPY, STENT PLACEMENT;  COMBINED COLONOSCOPY with Colonic Stent Placement;  Surgeon: Guru Lionel Mar MD;  Location: [facility]     COLONOSCOPY N/A 3/19/2018    Procedure: COMBINED COLONOSCOPY, STENT PLACEMENT;  flexible sigmoidoscopy with stent placement and dilation;  Surgeon: Alexis Rios MD;  Location: [facility]     GI SURGERY  07/10/2017    Extensive lysis of adhesions, small bowel resection with end ileostomy.      HERNIA REPAIR       INSERT PORT VASCULAR ACCESS Right 8/10/2017    Procedure: INSERT PORT VASCULAR ACCESS;  Single Lumen Right Chest Power Port;  Surgeon: Malena Andrew PA-C;  Location: [facility]     LAPAROSCOPY DIAGNOSTIC (GENERAL) N/A 12/6/2017    Procedure: LAPAROSCOPY DIAGNOSTIC (GENERAL);  Diagnostic Laparoscopy, Exploratory Laparotomy Anesthesia Block ;  Surgeon: Rob Dudley MD;  Location: [facility]     LAPAROTOMY EXPLORATORY N/A 12/6/2017    Procedure: LAPAROTOMY EXPLORATORY;;  Surgeon: Rob Dudley MD;  Location: [facility]     ORTHOPEDIC SURGERY Left     HIP ARTHROPLASTY     PICC INSERTION Right 02/23/2018    5Fr DL BioFlo PICC, 44cm (3cm external) in the R basilic vein w/ tip in the SVC RA junction      Left hip replacement.     Allergies:  Allergies as of 05/31/2018 - Julius as Reviewed 05/18/2018   Allergen Reaction Noted     Liquid adhesive Rash 03/01/2018     Current  Medications:  Current Outpatient Prescriptions   Medication Sig Dispense Refill     amylase-lipase-protease (CREON) 60775 UNITS CPEP Take 2 capsules (72,000 Units) by mouth 3 times daily (with meals) And 1 capsule with each snack tid. 240 capsule 3     dronabinol (MARINOL) 5 MG capsule Take 1 capsule (5 mg) by mouth 2 times daily (before meals) (Patient not taking: Reported on 5/18/2018) 60 capsule 0     fentaNYL (DURAGESIC) 25 mcg/hr 72 hr patch Place 1 patch onto the skin every 72 hours remove old patch. 10 patch 0     Ferrous Sulfate (IRON SUPPLEMENT PO) Take 325 mg by mouth 2 times daily (with meals)        LORazepam (ATIVAN) 0.5 MG tablet Take 1 tablet (0.5 mg) by mouth every 4 hours as needed (Anxiety, Nausea/Vomiting or Sleep) 30 tablet 5     Multiple Vitamins-Minerals (MULTIVITAMIN & MINERAL PO) Take 1 tablet by mouth daily       naloxone (NARCAN) nasal spray Spray 1 spray (4 mg) into one nostril alternating nostrils as needed for opioid reversal every 2-3 minutes until assistance arrives (Patient not taking: Reported on 5/3/2018) 0.2 mL 0     omeprazole (PRILOSEC) 20 MG CR capsule Take 1 capsule (20 mg) by mouth daily 30 capsule 11     ondansetron (ZOFRAN-ODT) 8 MG ODT tab Take 1 tablet (8 mg) by mouth every 8 hours as needed for nausea 60 tablet 5     opium tincture 10 MG/ML (1%) liquid Take by mouth every 4 hours as needed for diarrhea or moderate pain       opium tincture 10 MG/ML (1%) liquid Take 0.6 mLs (6 mg) by mouth 4 times daily 72 mL 0     oxyCODONE IR (ROXICODONE) 10 MG tablet Take 0.5-1 tablets (5-10 mg) by mouth every 4 hours as needed for moderate to severe pain 90 tablet 0     parenteral nutrition - PTA/DISCHARGE ORDER Inject into the vein daily See medication history note from 2/20/18 for most recent TPN formula.       Potassium Chloride 40 MEQ/15ML (20%) SOLN Take 7.5 mLs (20 mEq) by mouth daily 52.5 mL 0     Psyllium (METAMUCIL FIBER) 51.7 % PACK Take 1 packet by mouth 3 times daily 90  each 3     Physical Exam:  General: The patient is a pleasant male in no acute distress. He is here today with his mother.   /70 (BP Location: Left arm, Patient Position: Sitting, Cuff Size: Adult Regular)  Pulse 103  Temp 98.2  F (36.8  C) (Oral)  Resp 18  Wt 68.1 kg (150 lb 3.2 oz)  SpO2 93%  BMI 22.17 kg/m2  Wt Readings from Last 10 Encounters:   05/31/18 68.1 kg (150 lb 3.2 oz)   05/18/18 68 kg (150 lb)   05/03/18 67.6 kg (149 lb)   04/19/18 65.2 kg (143 lb 11.2 oz)   04/05/18 65.3 kg (144 lb)   03/29/18 65 kg (143 lb 3.2 oz)   03/22/18 67.6 kg (149 lb)   03/19/18 72.2 kg (159 lb 3.2 oz)   03/08/18 69.2 kg (152 lb 8 oz)   03/01/18 68.5 kg (151 lb 1.6 oz)   HEENT: EOMI, PERRL. Sclerae are anicteric. Oral mucosa is pink and moist with no lesions or thrush.   Lymph: Neck is supple with no lymphadenopathy in the cervical or supraclavicular areas.   Heart: Regular rate and rhythm.   Lungs: Clear to auscultation bilaterally.   Abdomen: Bowel sounds present, soft, mild central abdominal tenderness with central firmness, ostomy bag is in right abdomen with liquid green stool.  Extremities: No lower extremity edema noted bilaterally.   Neuro: Cranial nerves II through XII are grossly intact.  Skin: PICC line is in place in right arm.  No rashes or lesions on exposed skin.     Laboratory/Imaging Studies   5/31/2018 10:30   Sodium 132 (L)   Potassium 4.0   Chloride 99   Carbon Dioxide 25   Urea Nitrogen 33 (H)   Creatinine 1.05   GFR Estimate 73   GFR Estimate If Black 89   Calcium 9.1   Anion Gap 9   Magnesium 2.3   Phosphorus 3.7   Albumin 2.8 (L)   Protein Total 8.3   Bilirubin Total 0.2   Alkaline Phosphatase 228 (H)   ALT 50   AST 43   Glucose 105 (H)   WBC 11.5 (H)   Hemoglobin 10.9 (L)   Hematocrit 33.0 (L)   Platelet Count 369   RBC Count 3.88 (L)   MCV 85   MCH 28.1   MCHC 33.0   RDW 17.4 (H)   Diff Method Automated Method   % Neutrophils 65.7   % Lymphocytes 17.1   % Monocytes 13.4   % Eosinophils 3.1    % Basophils 0.4   % Immature Granulocytes 0.3   Nucleated RBCs 0   Absolute Neutrophil 7.5   Absolute Lymphocytes 2.0   Absolute Monocytes 1.5 (H)   Absolute Eosinophils 0.4   Absolute Basophils 0.1   Abs Immature Granulocytes 0.0   Absolute Nucleated RBC 0.0   Platelet Estimate Confirming automa...     NGS PANEL COLORECTAL    No mutations were identified in analyzed regions of KRAS, NRAS, BRAF, HRAS, or PIK3CA.     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  The sigmoid carcinoma is obstructing, requiring exploratory laparotomy and end ileostomy along with small bowel resection.    MSI intact and NGS panel does not reveal any identifiable mutations   He has been started on FOLFOX palliative chemotherapy. After 6 cycles, he had stable disease  On 12/6/17, he had Diagnostic laparoscopy and Exploratory laparotomy, but HIPEC was not performed as Peritoneal cancer index was 26 with diffuse involvement of the small bowel as well as the small bowel mesentery.   Repeat CT scan showed worsening peritoneal carcinomatosis and he is getting more symptomatic in terms of worsening abdominal pain and difficulty eating because of worsening abdominal cramping. He was admitted to the hospital with worsening colon distention and colon stent was placed which improved his symptoms. Now he is doing much better.   Plan was to resume FOLFOX, but then he was hospitalized with infectious colitis and bacteremia. He recovered well from this and resumed FOLFOX on 3/8/18. Because of previous neuropathy, the dose of oxaliplatin was decreased to 70 mg/m . We may consider adding Avatin or anti-EGFR therapy in the future as well.     He was tolerating FOLFOX fairly well. However, cycle 3 was worse than the previous two due to fatigue, worsening neuropathy, and increasing ostomy output, so treatment was held due to patient preference. CT CAP on 5/3/18 showed mild improvement in his disease. Due to his  progressive symptoms, the 5-FU bolus was held beginning with cycle 10. He is doing well today and will continue with cycle 12 FOLFOX today. He will follow up every 2 weeks prior to each cycle of chemotherapy and will have repeat imaging the end of June. He will call sooner for concerns.     Abdominal pain due to peritoneal carcinomatosis and colon obstruction. This is much better after placement of another colonic stent. Pain was initially worse with switching from OxyContin 20 mg bid to Fentanyl 12 mcg/hr. The increase to Fentanyl 25 mcg/hr on 4/3/18 has significantly helped his pain. He will continue with oxycodone prn, currently 10 mg bid-tid.    FEN. Patient remains on 12 hours TPN/day and is eating as tolerated. His weight is stable. Will discuss with [facility] trying to taper his TPN. He has not found a benefit from 5 mg bid Marinol, but has not taken it consistently. Discussed trying to take it more consistently the first week of chemotherapy. If still no benefit, could further increase the dose.  Hyponatremia. Mildly improved. Continue with 1L IV NS daily at home.      History of iron deficient anemia. Patient received IV iron in the August 2018. Hemoglobin has stabilized. Iron studies in early March 2018 show replete iron stores. He will continue on oral iron.    High output ostomy. Will continue with tincture of opium qid.     Peripheral neuropathy. Stable. Secondary to oxaliplatin.  Will try acupuncture in June.     Linda Alves PA-C  [facility]  [address] [place] [address]  [phone]

## 2018-05-31 NOTE — PROGRESS NOTES
"  Infusion Nursing Note:  Sebas Lopez presents today for C12 Oxaliplatin-Fluorouracil pump.    Patient seen by provider today: Yes: ANNAMARIE Mckenna    Treatment Conditions:  Lab Results   Component Value Date    HGB 10.9 05/31/2018     Lab Results   Component Value Date    WBC 11.5 05/31/2018      Lab Results   Component Value Date    ANEU 7.5 05/31/2018     Lab Results   Component Value Date     05/31/2018      Lab Results   Component Value Date     05/31/2018                   Lab Results   Component Value Date    POTASSIUM 4.0 05/31/2018           Lab Results   Component Value Date    MAG 2.3 05/31/2018            Lab Results   Component Value Date    CR 1.05 05/31/2018                   Lab Results   Component Value Date    ANNAMARIE 9.1 05/31/2018                Lab Results   Component Value Date    BILITOTAL 0.2 05/31/2018           Lab Results   Component Value Date    ALBUMIN 2.8 05/31/2018                    Lab Results   Component Value Date    ALT 50 05/31/2018           Lab Results   Component Value Date    AST 43 05/31/2018       Results reviewed, labs MET treatment parameters, ok to proceed with treatment.    Intravenous Access:  PICC.  Access intact at time of discharge with pump attached per protocol.  Connections checked by Zeenat Marcos RN.      Note:   Results reviewed, copy given to patient.  Proceed with treatment.    Prior to discharge: Port is secured in place with tegaderm and flushed with 10cc NS with positive blood return noted.  Continuous home infusion Dosi-Fuser pump connected.    All connectors secured in place and clamps taped open.    Pump started, \"running\" noted on display (CADD): Not Applicable.  Patient instructed to call our clinic or [facility] with any questions or concerns at home.  Patient verbalized understanding.    Patient set up for pump disconnect at home with [facility] on 6/2 @12:00, Patricia @ [facility] aware.    Copy of AVS given to " patient. Tolerated infusion without incident. No Prescriptions filled today.   D/C in care of spouse.  Pt will return 6/15 for next appointment.       Tawana Low RN

## 2018-05-31 NOTE — LETTER
5/31/2018      RE: Sebas Lopez  [address] [place] [address]       Oncology follow up visit:  Date on this visit: May 31, 2018    CC  sigmoid adenocarcinoma with peritoneal carcinomatosis    Primary Physician: Waylon Han     History Of Present Illness:     Please see previous note for details. I have copied and updated from prior notes.   Sebas is a 52 year old male who has a history of ulcerative colitis diagnosed more than 20 years ago, but he has not had medical management for that.  He was doing well, but in 05/2017 he presented with a few weeks of abdominal pain.  At that time, his imaging showed that he had a sigmoid wall thickening with adjacent mesenteric lymphadenopathy and free fluid near the abdominal wall mesh from his prior hernia surgery.  He subsequently underwent a colonoscopy which was incomplete and showed an obstructing sigmoid colon mass.  The biopsy from the sigmoid mass showed sigmoid adenocarcinoma.  He then developed obstructive symptoms and underwent exploratory laparotomy on 07/10/2017.  During that he was found to have diffuse peritoneal carcinomatosis.  Extensive lysis of adhesions was performed and a small bowel resection was performed with end ileostomy.  The biopsies from the multiple large peritoneal metastasis also were positive for metastatic adenocarcinoma. We still haven't received testing on MSI or NGS panel back     He started palliative FOLFOX on 8/11/17. C#6 given on 10/26/17  After 6 cycles, he has stable disease    On 12/6/17, he had Diagnostic laparoscopy and Exploratory laparotomy, but HIPEC was not performed as Peritoneal cancer index was 26 with diffuse involvement of the small bowel as well as the small bowel mesentery.     He then presented to ED on 1/26 with abdominal pain and associated nausea. CT A/P on 1/26 with 5.2 x 4.5 x 3.6 cm proximal sigmoid mass causnig colonic obstriction with singificant distention of cecum (7cm in diameter),  ascending colon and proximal transverse colon. No pneumatosis or pneumoperitoneum. Also small volume of ascites with associated findings concerning for peritoneal carcinomatosis, increased from prior studies. Dr. Dudley was consulted and recommended no surgical intervention.    He was then seen in clinic on 1/29/2018 due to inability to tolerate solid foods and he was getting abdominal cramping and some nausea. He also noticed that his ostomy output decreased. He was given IV fluids as well as antibiotics and was started on OxyContin 10 mg twice a day along with p.r.n. oxycodone. Of note a few weeks prior he was started on short acting octreotide to decrease the ostomy output but he stopped taking it due to worsening of the abdominal cramping. A colonic stent was placed on 2/6/18.    He was recently hospitalized from 2/20-2/25/18 with presumed infectious colitis and bacteremia. His port was removed. He was discharged home on IV vancomycin. He resumed FOLFOX with dose reduced oxaliplatin due to previous neuropathy on 3/8/18. He was hospitalized with a bowel obstruction from 3/18-3/20/18 and a colonic stent was placed on 3/19/18. He comes in today for routine follow up prior to his next cycle of FOLFOX.    Interim history  Patient reports that he felt sweaty for 2 nights after his last cycle of chemotherapy.  He did not have any fevers during that time and is doing better now.  He denies any change to his neuropathy.  His TPN remains on it 12 hours a day.  He does feel he has been eating better and his appetite has been better.  He is not taking the Marinol currently.  He is thinking about taking it during his first week of chemotherapy.  He reports no change to his loose stools.  He continues to take the tincture of opium 4 times a day.  He continues to use between 1/2-1 L of IV fluids a day.  His abdominal pain is stable with the use of fentanyl and 10 mg of oxycodone 2-3 times per day.  He denies other  concerns.    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     Ulcerative colitis (H)      Past Surgical History:   Procedure Laterality Date     COLONOSCOPY  2017     COLONOSCOPY N/A 11/30/2017    Procedure: COLONOSCOPY;  Colonoscopy;  Surgeon: Rob Dudley MD;  Location: [facility]     COLONOSCOPY N/A 2/6/2018    Procedure: COMBINED COLONOSCOPY, STENT PLACEMENT;  COMBINED COLONOSCOPY with Colonic Stent Placement;  Surgeon: Guru Lionel Mar MD;  Location: [facility]     COLONOSCOPY N/A 3/19/2018    Procedure: COMBINED COLONOSCOPY, STENT PLACEMENT;  flexible sigmoidoscopy with stent placement and dilation;  Surgeon: Alexis Rios MD;  Location: [facility]     GI SURGERY  07/10/2017    Extensive lysis of adhesions, small bowel resection with end ileostomy.      HERNIA REPAIR       INSERT PORT VASCULAR ACCESS Right 8/10/2017    Procedure: INSERT PORT VASCULAR ACCESS;  Single Lumen Right Chest Power Port;  Surgeon: Malena Andrew PA-C;  Location: [facility]     LAPAROSCOPY DIAGNOSTIC (GENERAL) N/A 12/6/2017    Procedure: LAPAROSCOPY DIAGNOSTIC (GENERAL);  Diagnostic Laparoscopy, Exploratory Laparotomy Anesthesia Block ;  Surgeon: Rob Dudley MD;  Location: [facility]     LAPAROTOMY EXPLORATORY N/A 12/6/2017    Procedure: LAPAROTOMY EXPLORATORY;;  Surgeon: Rob Dudley MD;  Location: [facility]     ORTHOPEDIC SURGERY Left     HIP ARTHROPLASTY     PICC INSERTION Right 02/23/2018    5Fr DL BioFlo PICC, 44cm (3cm external) in the R basilic vein w/ tip in the SVC RA junction      Left hip replacement.     Allergies:  Allergies as of 05/31/2018 - Julius as Reviewed 05/18/2018    Allergen Reaction Noted     Liquid adhesive Rash 03/01/2018     Current Medications:  Current Outpatient Prescriptions   Medication Sig Dispense Refill     amylase-lipase-protease (CREON) 51118 UNITS CPEP Take 2 capsules (72,000 Units) by mouth 3 times daily (with meals) And 1 capsule with each snack tid. 240 capsule 3     dronabinol (MARINOL) 5 MG capsule Take 1 capsule (5 mg) by mouth 2 times daily (before meals) (Patient not taking: Reported on 5/18/2018) 60 capsule 0     fentaNYL (DURAGESIC) 25 mcg/hr 72 hr patch Place 1 patch onto the skin every 72 hours remove old patch. 10 patch 0     Ferrous Sulfate (IRON SUPPLEMENT PO) Take 325 mg by mouth 2 times daily (with meals)        LORazepam (ATIVAN) 0.5 MG tablet Take 1 tablet (0.5 mg) by mouth every 4 hours as needed (Anxiety, Nausea/Vomiting or Sleep) 30 tablet 5     Multiple Vitamins-Minerals (MULTIVITAMIN & MINERAL PO) Take 1 tablet by mouth daily       naloxone (NARCAN) nasal spray Spray 1 spray (4 mg) into one nostril alternating nostrils as needed for opioid reversal every 2-3 minutes until assistance arrives (Patient not taking: Reported on 5/3/2018) 0.2 mL 0     omeprazole (PRILOSEC) 20 MG CR capsule Take 1 capsule (20 mg) by mouth daily 30 capsule 11     ondansetron (ZOFRAN-ODT) 8 MG ODT tab Take 1 tablet (8 mg) by mouth every 8 hours as needed for nausea 60 tablet 5     opium tincture 10 MG/ML (1%) liquid Take by mouth every 4 hours as needed for diarrhea or moderate pain       opium tincture 10 MG/ML (1%) liquid Take 0.6 mLs (6 mg) by mouth 4 times daily 72 mL 0     oxyCODONE IR (ROXICODONE) 10 MG tablet Take 0.5-1 tablets (5-10 mg) by mouth every 4 hours as needed for moderate to severe pain 90 tablet 0     parenteral nutrition - PTA/DISCHARGE ORDER Inject into the vein daily See medication history note from 2/20/18 for most recent TPN formula.       Potassium Chloride 40 MEQ/15ML (20%) SOLN Take 7.5 mLs (20 mEq) by mouth daily 52.5 mL 0     Psyllium  (METAMUCIL FIBER) 51.7 % PACK Take 1 packet by mouth 3 times daily 90 each 3     Physical Exam:  General: The patient is a pleasant male in no acute distress. He is here today with his mother.   /70 (BP Location: Left arm, Patient Position: Sitting, Cuff Size: Adult Regular)  Pulse 103  Temp 98.2  F (36.8  C) (Oral)  Resp 18  Wt 68.1 kg (150 lb 3.2 oz)  SpO2 93%  BMI 22.17 kg/m2  Wt Readings from Last 10 Encounters:   05/31/18 68.1 kg (150 lb 3.2 oz)   05/18/18 68 kg (150 lb)   05/03/18 67.6 kg (149 lb)   04/19/18 65.2 kg (143 lb 11.2 oz)   04/05/18 65.3 kg (144 lb)   03/29/18 65 kg (143 lb 3.2 oz)   03/22/18 67.6 kg (149 lb)   03/19/18 72.2 kg (159 lb 3.2 oz)   03/08/18 69.2 kg (152 lb 8 oz)   03/01/18 68.5 kg (151 lb 1.6 oz)   HEENT: EOMI, PERRL. Sclerae are anicteric. Oral mucosa is pink and moist with no lesions or thrush.   Lymph: Neck is supple with no lymphadenopathy in the cervical or supraclavicular areas.   Heart: Regular rate and rhythm.   Lungs: Clear to auscultation bilaterally.   Abdomen: Bowel sounds present, soft, mild central abdominal tenderness with central firmness, ostomy bag is in right abdomen with liquid green stool.  Extremities: No lower extremity edema noted bilaterally.   Neuro: Cranial nerves II through XII are grossly intact.  Skin: PICC line is in place in right arm.  No rashes or lesions on exposed skin.     Laboratory/Imaging Studies   5/31/2018 10:30   Sodium 132 (L)   Potassium 4.0   Chloride 99   Carbon Dioxide 25   Urea Nitrogen 33 (H)   Creatinine 1.05   GFR Estimate 73   GFR Estimate If Black 89   Calcium 9.1   Anion Gap 9   Magnesium 2.3   Phosphorus 3.7   Albumin 2.8 (L)   Protein Total 8.3   Bilirubin Total 0.2   Alkaline Phosphatase 228 (H)   ALT 50   AST 43   Glucose 105 (H)   WBC 11.5 (H)   Hemoglobin 10.9 (L)   Hematocrit 33.0 (L)   Platelet Count 369   RBC Count 3.88 (L)   MCV 85   MCH 28.1   MCHC 33.0   RDW 17.4 (H)   Diff Method Automated Method   %  Neutrophils 65.7   % Lymphocytes 17.1   % Monocytes 13.4   % Eosinophils 3.1   % Basophils 0.4   % Immature Granulocytes 0.3   Nucleated RBCs 0   Absolute Neutrophil 7.5   Absolute Lymphocytes 2.0   Absolute Monocytes 1.5 (H)   Absolute Eosinophils 0.4   Absolute Basophils 0.1   Abs Immature Granulocytes 0.0   Absolute Nucleated RBC 0.0   Platelet Estimate Confirming automa...     NGS PANEL COLORECTAL    No mutations were identified in analyzed regions of KRAS, NRAS, BRAF, HRAS, or PIK3CA.     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  The sigmoid carcinoma is obstructing, requiring exploratory laparotomy and end ileostomy along with small bowel resection.    MSI intact and NGS panel does not reveal any identifiable mutations   He has been started on FOLFOX palliative chemotherapy. After 6 cycles, he had stable disease  On 12/6/17, he had Diagnostic laparoscopy and Exploratory laparotomy, but HIPEC was not performed as Peritoneal cancer index was 26 with diffuse involvement of the small bowel as well as the small bowel mesentery.   Repeat CT scan showed worsening peritoneal carcinomatosis and he is getting more symptomatic in terms of worsening abdominal pain and difficulty eating because of worsening abdominal cramping. He was admitted to the hospital with worsening colon distention and colon stent was placed which improved his symptoms. Now he is doing much better.   Plan was to resume FOLFOX, but then he was hospitalized with infectious colitis and bacteremia. He recovered well from this and resumed FOLFOX on 3/8/18. Because of previous neuropathy, the dose of oxaliplatin was decreased to 70 mg/m . We may consider adding Avatin or anti-EGFR therapy in the future as well.     He was tolerating FOLFOX fairly well. However, cycle 3 was worse than the previous two due to fatigue, worsening neuropathy, and increasing ostomy output, so treatment was held due to patient  preference. CT CAP on 5/3/18 showed mild improvement in his disease. Due to his progressive symptoms, the 5-FU bolus was held beginning with cycle 10. He is doing well today and will continue with cycle 12 FOLFOX today. He will follow up every 2 weeks prior to each cycle of chemotherapy and will have repeat imaging the end of June. He will call sooner for concerns.     Abdominal pain due to peritoneal carcinomatosis and colon obstruction. This is much better after placement of another colonic stent. Pain was initially worse with switching from OxyContin 20 mg bid to Fentanyl 12 mcg/hr. The increase to Fentanyl 25 mcg/hr on 4/3/18 has significantly helped his pain. He will continue with oxycodone prn, currently 10 mg bid-tid.    FEN. Patient remains on 12 hours TPN/day and is eating as tolerated. His weight is stable. Will discuss with [facility] trying to taper his TPN. He has not found a benefit from 5 mg bid Marinol, but has not taken it consistently. Discussed trying to take it more consistently the first week of chemotherapy. If still no benefit, could further increase the dose.  Hyponatremia. Mildly improved. Continue with 1L IV NS daily at home.      History of iron deficient anemia. Patient received IV iron in the August 2018. Hemoglobin has stabilized. Iron studies in early March 2018 show replete iron stores. He will continue on oral iron.    High output ostomy. Will continue with tincture of opium qid.     Peripheral neuropathy. Stable. Secondary to oxaliplatin.  Will try acupuncture in June.     Linda Alves PA-C  [facility]  [address] [place] [address]  [phone]

## 2018-06-01 NOTE — PROGRESS NOTES
This is a recent snapshot of the patient's [facility] medical record.  For current drug dose and complete information and questions, call [phone] or In Basket pool, [facility] ([id number])  CSN Number:  [id number]

## 2018-06-08 NOTE — PROGRESS NOTES
This is a recent snapshot of the patient's [facility] medical record.  For current drug dose and complete information and questions, call [phone] or In Basket pool, [facility] ([id number])  CSN Number:  [id number]

## 2018-06-14 NOTE — PROGRESS NOTES
Oncology follow up visit:  Date on this visit: Juwan 15, 2018    CC  sigmoid adenocarcinoma with peritoneal carcinomatosis    Primary Physician: Waylon Han     History Of Present Illness:     Please see previous note for details. I have copied and updated from prior notes.   Sebas is a 53 year old male who has a history of ulcerative colitis diagnosed more than 20 years ago, but he has not had medical management for that.  He was doing well, but in 05/2017 he presented with a few weeks of abdominal pain.  At that time, his imaging showed that he had a sigmoid wall thickening with adjacent mesenteric lymphadenopathy and free fluid near the abdominal wall mesh from his prior hernia surgery.  He subsequently underwent a colonoscopy which was incomplete and showed an obstructing sigmoid colon mass.  The biopsy from the sigmoid mass showed sigmoid adenocarcinoma.  He then developed obstructive symptoms and underwent exploratory laparotomy on 07/10/2017.  During that he was found to have diffuse peritoneal carcinomatosis.  Extensive lysis of adhesions was performed and a small bowel resection was performed with end ileostomy.  The biopsies from the multiple large peritoneal metastasis also were positive for metastatic adenocarcinoma. We still haven't received testing on MSI or NGS panel back     He started palliative FOLFOX on 8/11/17. C#6 given on 10/26/17  After 6 cycles, he has stable disease    On 12/6/17, he had Diagnostic laparoscopy and Exploratory laparotomy, but HIPEC was not performed as Peritoneal cancer index was 26 with diffuse involvement of the small bowel as well as the small bowel mesentery.     He then presented to ED on 1/26 with abdominal pain and associated nausea. CT A/P on 1/26 with 5.2 x 4.5 x 3.6 cm proximal sigmoid mass causnig colonic obstriction with singificant distention of cecum (7cm in diameter), ascending colon and proximal transverse colon. No pneumatosis or pneumoperitoneum. Also  small volume of ascites with associated findings concerning for peritoneal carcinomatosis, increased from prior studies. Dr. Dudley was consulted and recommended no surgical intervention.    He was then seen in clinic on 1/29/2018 due to inability to tolerate solid foods and he was getting abdominal cramping and some nausea. He also noticed that his ostomy output decreased. He was given IV fluids as well as antibiotics and was started on OxyContin 10 mg twice a day along with p.r.n. oxycodone. Of note a few weeks prior he was started on short acting octreotide to decrease the ostomy output but he stopped taking it due to worsening of the abdominal cramping. A colonic stent was placed on 2/6/18.    He was recently hospitalized from 2/20-2/25/18 with presumed infectious colitis and bacteremia. His port was removed. He was discharged home on IV vancomycin. He resumed FOLFOX with dose reduced oxaliplatin due to previous neuropathy on 3/8/18. He was hospitalized with a bowel obstruction from 3/18-3/20/18 and a colonic stent was placed on 3/19/18. He comes in today for routine follow up prior to his next cycle of FOLFOX.    Interim history:    Patient here today for follow up accompanied by family member. He reports feeling improved in the last week. Current abdominal pain rated 1/10. He continues to take oxycodone 1 mg 2-3 times daily and Fentanyl 25 mcg/hr. He reports having pain in abdomen after chemotherapy rated 8/10 - he took oxycodone that was effective. He reports a good appetite with intake of 7162-6681 calories daily. He continues on 12 hrs TPN and daily IV fluids. He did have cold sensitivity in his hands as well as neuropathy after chemotherapy, that was stable in intensity and longer duration. Denies mouth sores, but has sensitivity with hot and spicy foods, which he has been avoiding. Complaint of dry eyes for which he is using OTC refresh drops and ointment prn. He has not been taking Marinol as did not  feel was effective, but plans to try again with dose increase of 2 tabs. Nausea reported, taking Zofran TID and effective. High ostomy output and changing Q 2 hrs. Continues to take opium tincture 4 times daily. Also taking karen root and peppermint oil once daily. Denies sweating at night.       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     Ulcerative colitis (H)      Past Surgical History:   Procedure Laterality Date     COLONOSCOPY  2017     COLONOSCOPY N/A 11/30/2017    Procedure: COLONOSCOPY;  Colonoscopy;  Surgeon: Rob Dudley MD;  Location: [facility]     COLONOSCOPY N/A 2/6/2018    Procedure: COMBINED COLONOSCOPY, STENT PLACEMENT;  COMBINED COLONOSCOPY with Colonic Stent Placement;  Surgeon: Guru Lionel Mar MD;  Location: [facility]     COLONOSCOPY N/A 3/19/2018    Procedure: COMBINED COLONOSCOPY, STENT PLACEMENT;  flexible sigmoidoscopy with stent placement and dilation;  Surgeon: Alexis Rios MD;  Location: [facility]     GI SURGERY  07/10/2017    Extensive lysis of adhesions, small bowel resection with end ileostomy.      HERNIA REPAIR       INSERT PORT VASCULAR ACCESS Right 8/10/2017    Procedure: INSERT PORT VASCULAR ACCESS;  Single Lumen Right Chest Power Port;  Surgeon: Malena Andrew PA-C;  Location: [facility]     LAPAROSCOPY DIAGNOSTIC (GENERAL) N/A 12/6/2017    Procedure: LAPAROSCOPY DIAGNOSTIC (GENERAL);  Diagnostic Laparoscopy, Exploratory Laparotomy Anesthesia Block ;  Surgeon: Rob Dudley MD;  Location: [facility]     LAPAROTOMY EXPLORATORY N/A 12/6/2017    Procedure: LAPAROTOMY EXPLORATORY;;  Surgeon: Rob Dudley MD;  Location: [facility]      ORTHOPEDIC SURGERY Left     HIP ARTHROPLASTY     PICC INSERTION Right 02/23/2018    5Fr DL BioFlo PICC, 44cm (3cm external) in the R basilic vein w/ tip in the SVC RA junction      Left hip replacement.     Allergies:  Allergies as of 06/15/2018 - Julius as Reviewed 06/15/2018   Allergen Reaction Noted     Liquid adhesive Rash 03/01/2018     Current Medications:  Current Outpatient Prescriptions   Medication Sig Dispense Refill     amoxicillin (AMOXIL) 500 MG capsule        amylase-lipase-protease (CREON) 25024 UNITS CPEP Take 2 capsules (72,000 Units) by mouth 3 times daily (with meals) And 1 capsule with each snack tid. 240 capsule 3     diphenoxylate-atropine (LOMOTIL) 2.5-0.025 MG per tablet        doxycycline monohydrate 100 MG capsule        fentaNYL (DURAGESIC) 25 mcg/hr 72 hr patch Place 1 patch onto the skin every 72 hours remove old patch. 1 patch 0     Ferrous Sulfate (IRON SUPPLEMENT PO) Take 325 mg by mouth 2 times daily (with meals)        LORazepam (ATIVAN) 0.5 MG tablet Take 1 tablet (0.5 mg) by mouth every 4 hours as needed (Anxiety, Nausea/Vomiting or Sleep) 30 tablet 5     metroNIDAZOLE (FLAGYL) 500 MG tablet        Multiple Vitamins-Minerals (MULTIVITAMIN & MINERAL PO) Take 1 tablet by mouth daily       omeprazole (PRILOSEC) 20 MG CR capsule Take 1 capsule (20 mg) by mouth daily 30 capsule 11     ondansetron (ZOFRAN) 8 MG tablet        ondansetron (ZOFRAN-ODT) 8 MG ODT tab Take 1 tablet (8 mg) by mouth every 8 hours as needed for nausea 60 tablet 5     opium tincture 10 MG/ML (1%) liquid Take by mouth every 4 hours as needed for diarrhea or moderate pain       opium tincture 10 MG/ML (1%) liquid Take 0.6 mLs (6 mg) by mouth 4 times daily 72 mL 0     oxyCODONE IR (ROXICODONE) 10 MG tablet Take 0.5-1 tablets (5-10 mg) by mouth every 4 hours as needed for moderate to severe pain 90 tablet 0     parenteral nutrition - PTA/DISCHARGE ORDER Inject into the vein daily See medication history note from  2/20/18 for most recent TPN formula.       Potassium Chloride 40 MEQ/15ML (20%) SOLN Take 7.5 mLs (20 mEq) by mouth daily 52.5 mL 0     Psyllium (METAMUCIL FIBER) 51.7 % PACK Take 1 packet by mouth 3 times daily 90 each 3     dronabinol (MARINOL) 5 MG capsule Take 1 capsule (5 mg) by mouth 2 times daily (before meals) (Patient not taking: Reported on 5/18/2018) 60 capsule 0     naloxone (NARCAN) nasal spray Spray 1 spray (4 mg) into one nostril alternating nostrils as needed for opioid reversal every 2-3 minutes until assistance arrives (Patient not taking: Reported on 5/3/2018) 0.2 mL 0     Physical Exam:  General: The patient is a pleasant male in no acute distress. He is here today with his mother.   /72  Pulse 95  Temp 98.8  F (37.1  C)  Resp 16  Wt 69.2 kg (152 lb 9.6 oz)  SpO2 97%  BMI 22.54 kg/m2  Wt Readings from Last 10 Encounters:   06/15/18 69.2 kg (152 lb 9.6 oz)   05/31/18 68.1 kg (150 lb 3.2 oz)   05/18/18 68 kg (150 lb)   05/03/18 67.6 kg (149 lb)   04/19/18 65.2 kg (143 lb 11.2 oz)   04/05/18 65.3 kg (144 lb)   03/29/18 65 kg (143 lb 3.2 oz)   03/22/18 67.6 kg (149 lb)   03/19/18 72.2 kg (159 lb 3.2 oz)   03/08/18 69.2 kg (152 lb 8 oz)   HEENT: EOMI, PERRL. Sclerae are anicteric. Oral mucosa is pink and moist with no lesions or thrush.   Lymph: Neck is supple with no lymphadenopathy in the cervical or supraclavicular areas.   Heart: Regular rate and rhythm.   Lungs: Clear to auscultation bilaterally.   Abdomen: Bowel sounds present, soft, mild central abdominal tenderness with central firmness, ostomy bag is in right abdomen with liquid green stool.  Extremities: No lower extremity edema noted bilaterally.   Neuro: Cranial nerves II through XII are grossly intact.  Skin: PICC line is in place in right arm.  No rashes or lesions on exposed skin.     Laboratory/Imaging Studies   6/15/2018 10:11   Sodium 133   Potassium 3.3 (L)   Chloride 92 (L)   Carbon Dioxide 25   Urea Nitrogen 32 (H)    Creatinine 1.03   GFR Estimate 75   GFR Estimate If Black >90   Calcium 9.1   Anion Gap 15 (H)   Magnesium 2.4 (H)   Phosphorus 4.5   Albumin 2.7 (L)   Protein Total 7.8   Bilirubin Total 0.7   Alkaline Phosphatase 182 (H)   ALT 33   AST 49 (H)   Glucose 94   WBC 9.6   Hemoglobin 10.5 (L)   Hematocrit 32.0 (L)   Platelet Count 317   RBC Count 3.80 (L)   MCV 84   MCH 27.6   MCHC 32.8   RDW 17.4 (H)   Diff Method Automated Method   % Neutrophils 62.6   % Lymphocytes 16.5   % Monocytes 16.4   % Eosinophils 3.2   % Basophils 0.8   % Immature Granulocytes 0.5   Nucleated RBCs 0   Absolute Neutrophil 6.0   Absolute Lymphocytes 1.6   Absolute Monocytes 1.6 (H)   Absolute Eosinophils 0.3   Absolute Basophils 0.1   Abs Immature Granulocytes 0.1   Absolute Nucleated RBC 0.0       NGS PANEL COLORECTAL    No mutations were identified in analyzed regions of KRAS, NRAS, BRAF, HRAS, or PIK3CA.     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  The sigmoid carcinoma is obstructing, requiring exploratory laparotomy and end ileostomy along with small bowel resection.    MSI intact and NGS panel does not reveal any identifiable mutations   He has been started on FOLFOX palliative chemotherapy. After 6 cycles, he had stable disease  On 12/6/17, he had Diagnostic laparoscopy and Exploratory laparotomy, but HIPEC was not performed as Peritoneal cancer index was 26 with diffuse involvement of the small bowel as well as the small bowel mesentery.   Repeat CT scan showed worsening peritoneal carcinomatosis and he is getting more symptomatic in terms of worsening abdominal pain and difficulty eating because of worsening abdominal cramping. He was admitted to the hospital with worsening colon distention and colon stent was placed which improved his symptoms. Now he is doing much better.   Plan was to resume FOLFOX, but then he was hospitalized with infectious colitis and bacteremia. He recovered  well from this and resumed FOLFOX on 3/8/18. Because of previous neuropathy, the dose of oxaliplatin was decreased to 70 mg/m . We may consider adding Avatin or anti-EGFR therapy in the future as well.     He was tolerating FOLFOX fairly well. However, cycle 3 was worse than the previous two due to fatigue, worsening neuropathy, and increasing ostomy output, so treatment was held due to patient preference. CT CAP on 5/3/18 showed mild improvement in his disease. Due to his progressive symptoms, the 5-FU bolus was held beginning with cycle 10. He is doing well today and will continue with cycle 13 FOLFOX today. He will follow up every 2 weeks prior to each cycle of chemotherapy and will have repeat imaging the end of June. He will call sooner for concerns. Scheduled for CT CAP 6/27/218. Follow-up with Dr. Long 6/28/218    Abdominal pain due to peritoneal carcinomatosis and colon obstruction. This is much better after placement of another colonic stent. Pain was initially worse with switching from OxyContin 20 mg bid to Fentanyl 12 mcg/hr. The increase to Fentanyl 25 mcg/hr on 4/3/18 has significantly helped his pain. He will continue with oxycodone prn, currently 10 mg bid-tid.    FEN. Patient remains on 12 hours TPN/day and is eating as tolerated. His weight is stable. Discussed with [facility] trying to taper his TPN. He has not found a benefit from 5 mg bid Marinol, but has not taken it consistently. Discussed trying to take it more consistently the first week of chemotherapy. He will increase the dose to 10 mg bid.     Hyponatremia.  Continue with 1L IV NS daily at home.  Discussed will continue with 1L IV NS daily at home when TPN taper, due to high ostomy output.     High output ostomy. Will continue with tincture of opium qid.    History of iron deficient anemia. Patient received IV iron in the August 2018. Hemoglobin has stabilized. Iron studies in early March 2018 show replete iron stores. He will  continue on oral iron.    Peripheral neuropathy. Stable. Secondary to oxaliplatin.  Will try acupuncture in June.         Patient seen in coordination with Linda ASENCIO. Please see attestation.     Luz Elena LIU         The patient was seen in conjunction with Luz Elena LIU who served as a scribe for today's visit. I have reviewed and edited the note and agree with the above findings and plan.  Linda Alves PA-C

## 2018-06-14 NOTE — PROGRESS NOTES
This is a recent snapshot of the patient's [facility] medical record.  For current drug dose and complete information and questions, call [phone] or In Basket pool, [facility] ([id number])  CSN Number:  [id number]

## 2018-06-15 NOTE — NURSING NOTE
"Oncology Rooming Note    Dinora 15, 2018 10:19 AM   Sebas Lopez is a 53 year old male who presents for:    Chief Complaint   Patient presents with     Labs Only     drawn from picc, heparin locked, vitals checked     Oncology Clinic Visit     Colon Cancer     Initial Vitals: /72  Pulse 95  Temp 98.8  F (37.1  C)  Resp 16  Wt 69.2 kg (152 lb 9.6 oz)  SpO2 97%  BMI 22.54 kg/m2 Estimated body mass index is 22.54 kg/(m^2) as calculated from the following:    Height as of 5/31/18: 1.753 m (5' 9\").    Weight as of this encounter: 69.2 kg (152 lb 9.6 oz). Body surface area is 1.84 meters squared.  No Pain (0) Comment: Data Unavailable   No LMP for male patient.  Allergies reviewed: Yes  Medications reviewed: Yes    Medications: Medication refills not needed today.  Pharmacy name entered into [employer]: [facility] [place] - [address]    Clinical concerns: No New Concerns    5 minutes for nursing intake (face to face time)     BRE Granda      "

## 2018-06-15 NOTE — LETTER
6/15/2018      RE: Sebas Lopez  [address] [place] [address]       Oncology follow up visit:  Date on this visit: Juwan 15, 2018    CC  sigmoid adenocarcinoma with peritoneal carcinomatosis    Primary Physician: Waylon Han     History Of Present Illness:     Please see previous note for details. I have copied and updated from prior notes.   Sebas is a 53 year old male who has a history of ulcerative colitis diagnosed more than 20 years ago, but he has not had medical management for that.  He was doing well, but in 05/2017 he presented with a few weeks of abdominal pain.  At that time, his imaging showed that he had a sigmoid wall thickening with adjacent mesenteric lymphadenopathy and free fluid near the abdominal wall mesh from his prior hernia surgery.  He subsequently underwent a colonoscopy which was incomplete and showed an obstructing sigmoid colon mass.  The biopsy from the sigmoid mass showed sigmoid adenocarcinoma.  He then developed obstructive symptoms and underwent exploratory laparotomy on 07/10/2017.  During that he was found to have diffuse peritoneal carcinomatosis.  Extensive lysis of adhesions was performed and a small bowel resection was performed with end ileostomy.  The biopsies from the multiple large peritoneal metastasis also were positive for metastatic adenocarcinoma. We still haven't received testing on MSI or NGS panel back     He started palliative FOLFOX on 8/11/17. C#6 given on 10/26/17  After 6 cycles, he has stable disease    On 12/6/17, he had Diagnostic laparoscopy and Exploratory laparotomy, but HIPEC was not performed as Peritoneal cancer index was 26 with diffuse involvement of the small bowel as well as the small bowel mesentery.     He then presented to ED on 1/26 with abdominal pain and associated nausea. CT A/P on 1/26 with 5.2 x 4.5 x 3.6 cm proximal sigmoid mass causnig colonic obstriction with singificant distention of cecum (7cm in diameter),  ascending colon and proximal transverse colon. No pneumatosis or pneumoperitoneum. Also small volume of ascites with associated findings concerning for peritoneal carcinomatosis, increased from prior studies. Dr. Dudley was consulted and recommended no surgical intervention.    He was then seen in clinic on 1/29/2018 due to inability to tolerate solid foods and he was getting abdominal cramping and some nausea. He also noticed that his ostomy output decreased. He was given IV fluids as well as antibiotics and was started on OxyContin 10 mg twice a day along with p.r.n. oxycodone. Of note a few weeks prior he was started on short acting octreotide to decrease the ostomy output but he stopped taking it due to worsening of the abdominal cramping. A colonic stent was placed on 2/6/18.    He was recently hospitalized from 2/20-2/25/18 with presumed infectious colitis and bacteremia. His port was removed. He was discharged home on IV vancomycin. He resumed FOLFOX with dose reduced oxaliplatin due to previous neuropathy on 3/8/18. He was hospitalized with a bowel obstruction from 3/18-3/20/18 and a colonic stent was placed on 3/19/18. He comes in today for routine follow up prior to his next cycle of FOLFOX.    Interim history:    Patient here today for follow up accompanied by family member. He reports feeling improved in the last week. Current abdominal pain rated 1/10. He continues to take oxycodone 1 mg 2-3 times daily and Fentanyl 25 mcg/hr. He reports having pain in abdomen after chemotherapy rated 8/10 - he took oxycodone that was effective. He reports a good appetite with intake of 9389-4171 calories daily. He continues on 12 hrs TPN and daily IV fluids. He did have cold sensitivity in his hands as well as neuropathy after chemotherapy, that was stable in intensity and longer duration. Denies mouth sores, but has sensitivity with hot and spicy foods, which he has been avoiding. Complaint of dry eyes for which he  is using OTC refresh drops and ointment prn. He has not been taking Marinol as did not feel was effective, but plans to try again with dose increase of 2 tabs. Nausea reported, taking Zofran TID and effective. High ostomy output and changing Q 2 hrs. Continues to take opium tincture 4 times daily. Also taking karen root and peppermint oil once daily. Denies sweating at night.       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     Ulcerative colitis (H)      Past Surgical History:   Procedure Laterality Date     COLONOSCOPY  2017     COLONOSCOPY N/A 11/30/2017    Procedure: COLONOSCOPY;  Colonoscopy;  Surgeon: Rob Dudley MD;  Location: [facility]     COLONOSCOPY N/A 2/6/2018    Procedure: COMBINED COLONOSCOPY, STENT PLACEMENT;  COMBINED COLONOSCOPY with Colonic Stent Placement;  Surgeon: Guru Lionel Mar MD;  Location: [facility]     COLONOSCOPY N/A 3/19/2018    Procedure: COMBINED COLONOSCOPY, STENT PLACEMENT;  flexible sigmoidoscopy with stent placement and dilation;  Surgeon: Alexis Rios MD;  Location: [facility]     GI SURGERY  07/10/2017    Extensive lysis of adhesions, small bowel resection with end ileostomy.      HERNIA REPAIR       INSERT PORT VASCULAR ACCESS Right 8/10/2017    Procedure: INSERT PORT VASCULAR ACCESS;  Single Lumen Right Chest Power Port;  Surgeon: Malena Andrew PA-C;  Location: [facility]     LAPAROSCOPY DIAGNOSTIC (GENERAL) N/A 12/6/2017    Procedure: LAPAROSCOPY DIAGNOSTIC (GENERAL);  Diagnostic Laparoscopy, Exploratory Laparotomy Anesthesia Block ;  Surgeon: Rob Dudley MD;  Location: [facility]     LAPAROTOMY EXPLORATORY N/A 12/6/2017     Procedure: LAPAROTOMY EXPLORATORY;;  Surgeon: Rob Dudley MD;  Location: [facility]     ORTHOPEDIC SURGERY Left     HIP ARTHROPLASTY     PICC INSERTION Right 02/23/2018    5Fr DL BioFlo PICC, 44cm (3cm external) in the R basilic vein w/ tip in the SVC RA junction      Left hip replacement.     Allergies:  Allergies as of 06/15/2018 - Julius as Reviewed 06/15/2018   Allergen Reaction Noted     Liquid adhesive Rash 03/01/2018     Current Medications:  Current Outpatient Prescriptions   Medication Sig Dispense Refill     amoxicillin (AMOXIL) 500 MG capsule        amylase-lipase-protease (CREON) 68389 UNITS CPEP Take 2 capsules (72,000 Units) by mouth 3 times daily (with meals) And 1 capsule with each snack tid. 240 capsule 3     diphenoxylate-atropine (LOMOTIL) 2.5-0.025 MG per tablet        doxycycline monohydrate 100 MG capsule        fentaNYL (DURAGESIC) 25 mcg/hr 72 hr patch Place 1 patch onto the skin every 72 hours remove old patch. 1 patch 0     Ferrous Sulfate (IRON SUPPLEMENT PO) Take 325 mg by mouth 2 times daily (with meals)        LORazepam (ATIVAN) 0.5 MG tablet Take 1 tablet (0.5 mg) by mouth every 4 hours as needed (Anxiety, Nausea/Vomiting or Sleep) 30 tablet 5     metroNIDAZOLE (FLAGYL) 500 MG tablet        Multiple Vitamins-Minerals (MULTIVITAMIN & MINERAL PO) Take 1 tablet by mouth daily       omeprazole (PRILOSEC) 20 MG CR capsule Take 1 capsule (20 mg) by mouth daily 30 capsule 11     ondansetron (ZOFRAN) 8 MG tablet        ondansetron (ZOFRAN-ODT) 8 MG ODT tab Take 1 tablet (8 mg) by mouth every 8 hours as needed for nausea 60 tablet 5     opium tincture 10 MG/ML (1%) liquid Take by mouth every 4 hours as needed for diarrhea or moderate pain       opium tincture 10 MG/ML (1%) liquid Take 0.6 mLs (6 mg) by mouth 4 times daily 72 mL 0     oxyCODONE IR (ROXICODONE) 10 MG tablet Take 0.5-1 tablets (5-10 mg) by mouth every 4 hours as needed for moderate to severe pain 90 tablet 0     parenteral  nutrition - PTA/DISCHARGE ORDER Inject into the vein daily See medication history note from 2/20/18 for most recent TPN formula.       Potassium Chloride 40 MEQ/15ML (20%) SOLN Take 7.5 mLs (20 mEq) by mouth daily 52.5 mL 0     Psyllium (METAMUCIL FIBER) 51.7 % PACK Take 1 packet by mouth 3 times daily 90 each 3     dronabinol (MARINOL) 5 MG capsule Take 1 capsule (5 mg) by mouth 2 times daily (before meals) (Patient not taking: Reported on 5/18/2018) 60 capsule 0     naloxone (NARCAN) nasal spray Spray 1 spray (4 mg) into one nostril alternating nostrils as needed for opioid reversal every 2-3 minutes until assistance arrives (Patient not taking: Reported on 5/3/2018) 0.2 mL 0     Physical Exam:  General: The patient is a pleasant male in no acute distress. He is here today with his mother.   /72  Pulse 95  Temp 98.8  F (37.1  C)  Resp 16  Wt 69.2 kg (152 lb 9.6 oz)  SpO2 97%  BMI 22.54 kg/m2  Wt Readings from Last 10 Encounters:   06/15/18 69.2 kg (152 lb 9.6 oz)   05/31/18 68.1 kg (150 lb 3.2 oz)   05/18/18 68 kg (150 lb)   05/03/18 67.6 kg (149 lb)   04/19/18 65.2 kg (143 lb 11.2 oz)   04/05/18 65.3 kg (144 lb)   03/29/18 65 kg (143 lb 3.2 oz)   03/22/18 67.6 kg (149 lb)   03/19/18 72.2 kg (159 lb 3.2 oz)   03/08/18 69.2 kg (152 lb 8 oz)   HEENT: EOMI, PERRL. Sclerae are anicteric. Oral mucosa is pink and moist with no lesions or thrush.   Lymph: Neck is supple with no lymphadenopathy in the cervical or supraclavicular areas.   Heart: Regular rate and rhythm.   Lungs: Clear to auscultation bilaterally.   Abdomen: Bowel sounds present, soft, mild central abdominal tenderness with central firmness, ostomy bag is in right abdomen with liquid green stool.  Extremities: No lower extremity edema noted bilaterally.   Neuro: Cranial nerves II through XII are grossly intact.  Skin: PICC line is in place in right arm.  No rashes or lesions on exposed skin.     Laboratory/Imaging Studies   6/15/2018 10:11    Sodium 133   Potassium 3.3 (L)   Chloride 92 (L)   Carbon Dioxide 25   Urea Nitrogen 32 (H)   Creatinine 1.03   GFR Estimate 75   GFR Estimate If Black >90   Calcium 9.1   Anion Gap 15 (H)   Magnesium 2.4 (H)   Phosphorus 4.5   Albumin 2.7 (L)   Protein Total 7.8   Bilirubin Total 0.7   Alkaline Phosphatase 182 (H)   ALT 33   AST 49 (H)   Glucose 94   WBC 9.6   Hemoglobin 10.5 (L)   Hematocrit 32.0 (L)   Platelet Count 317   RBC Count 3.80 (L)   MCV 84   MCH 27.6   MCHC 32.8   RDW 17.4 (H)   Diff Method Automated Method   % Neutrophils 62.6   % Lymphocytes 16.5   % Monocytes 16.4   % Eosinophils 3.2   % Basophils 0.8   % Immature Granulocytes 0.5   Nucleated RBCs 0   Absolute Neutrophil 6.0   Absolute Lymphocytes 1.6   Absolute Monocytes 1.6 (H)   Absolute Eosinophils 0.3   Absolute Basophils 0.1   Abs Immature Granulocytes 0.1   Absolute Nucleated RBC 0.0       NGS PANEL COLORECTAL    No mutations were identified in analyzed regions of KRAS, NRAS, BRAF, HRAS, or PIK3CA.     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  The sigmoid carcinoma is obstructing, requiring exploratory laparotomy and end ileostomy along with small bowel resection.    MSI intact and NGS panel does not reveal any identifiable mutations   He has been started on FOLFOX palliative chemotherapy. After 6 cycles, he had stable disease  On 12/6/17, he had Diagnostic laparoscopy and Exploratory laparotomy, but HIPEC was not performed as Peritoneal cancer index was 26 with diffuse involvement of the small bowel as well as the small bowel mesentery.   Repeat CT scan showed worsening peritoneal carcinomatosis and he is getting more symptomatic in terms of worsening abdominal pain and difficulty eating because of worsening abdominal cramping. He was admitted to the hospital with worsening colon distention and colon stent was placed which improved his symptoms. Now he is doing much better.   Plan was to resume  FOLFOX, but then he was hospitalized with infectious colitis and bacteremia. He recovered well from this and resumed FOLFOX on 3/8/18. Because of previous neuropathy, the dose of oxaliplatin was decreased to 70 mg/m . We may consider adding Avatin or anti-EGFR therapy in the future as well.     He was tolerating FOLFOX fairly well. However, cycle 3 was worse than the previous two due to fatigue, worsening neuropathy, and increasing ostomy output, so treatment was held due to patient preference. CT CAP on 5/3/18 showed mild improvement in his disease. Due to his progressive symptoms, the 5-FU bolus was held beginning with cycle 10. He is doing well today and will continue with cycle 13 FOLFOX today. He will follow up every 2 weeks prior to each cycle of chemotherapy and will have repeat imaging the end of June. He will call sooner for concerns. Scheduled for CT CAP 6/27/218. Follow-up with Dr. Long 6/28/218    Abdominal pain due to peritoneal carcinomatosis and colon obstruction. This is much better after placement of another colonic stent. Pain was initially worse with switching from OxyContin 20 mg bid to Fentanyl 12 mcg/hr. The increase to Fentanyl 25 mcg/hr on 4/3/18 has significantly helped his pain. He will continue with oxycodone prn, currently 10 mg bid-tid.    FEN. Patient remains on 12 hours TPN/day and is eating as tolerated. His weight is stable. Discussed with [facility] home infusion trying to taper his TPN. He has not found a benefit from 5 mg bid Marinol, but has not taken it consistently. Discussed trying to take it more consistently the first week of chemotherapy. He will increase the dose to 10 mg bid.     Hyponatremia.  Continue with 1L IV NS daily at home.  Discussed will continue with 1L IV NS daily at home when TPN taper, due to high ostomy output.     High output ostomy. Will continue with tincture of opium qid.    History of iron deficient anemia. Patient received IV iron in the August 2018.  Hemoglobin has stabilized. Iron studies in early March 2018 show replete iron stores. He will continue on oral iron.    Peripheral neuropathy. Stable. Secondary to oxaliplatin.  Will try acupuncture in June.         Patient seen in coordination with Linda ASENCIO. Please see attestation.     Luz Elena LIU         The patient was seen in conjunction with Luz Elena LIU who served as a scribe for today's visit. I have reviewed and edited the note and agree with the above findings and plan.  SUPA Mckenna PA-C

## 2018-06-15 NOTE — Clinical Note
6/15/2018       RE: Sebas Lopez  [address] [place] [address]     Dear Colleague,    Thank you for referring your patient, Sebas Lopez, to the [facility]. Please see a copy of my visit note below.    Oncology follow up visit:  Date on this visit: Juwan 15, 2018    CC  sigmoid adenocarcinoma with peritoneal carcinomatosis    Primary Physician: Waylon Han     History Of Present Illness:     Please see previous note for details. I have copied and updated from prior notes.   Sebas is a 53 year old male who has a history of ulcerative colitis diagnosed more than 20 years ago, but he has not had medical management for that.  He was doing well, but in 05/2017 he presented with a few weeks of abdominal pain.  At that time, his imaging showed that he had a sigmoid wall thickening with adjacent mesenteric lymphadenopathy and free fluid near the abdominal wall mesh from his prior hernia surgery.  He subsequently underwent a colonoscopy which was incomplete and showed an obstructing sigmoid colon mass.  The biopsy from the sigmoid mass showed sigmoid adenocarcinoma.  He then developed obstructive symptoms and underwent exploratory laparotomy on 07/10/2017.  During that he was found to have diffuse peritoneal carcinomatosis.  Extensive lysis of adhesions was performed and a small bowel resection was performed with end ileostomy.  The biopsies from the multiple large peritoneal metastasis also were positive for metastatic adenocarcinoma. We still haven't received testing on MSI or NGS panel back     He started palliative FOLFOX on 8/11/17. C#6 given on 10/26/17  After 6 cycles, he has stable disease    On 12/6/17, he had Diagnostic laparoscopy and Exploratory laparotomy, but HIPEC was not performed as Peritoneal cancer index was 26 with diffuse involvement of the small bowel as well as the small bowel mesentery.     He then presented to ED on 1/26 with abdominal pain and  associated nausea. CT A/P on 1/26 with 5.2 x 4.5 x 3.6 cm proximal sigmoid mass causnig colonic obstriction with singificant distention of cecum (7cm in diameter), ascending colon and proximal transverse colon. No pneumatosis or pneumoperitoneum. Also small volume of ascites with associated findings concerning for peritoneal carcinomatosis, increased from prior studies. Dr. Dudley was consulted and recommended no surgical intervention.    He was then seen in clinic on 1/29/2018 due to inability to tolerate solid foods and he was getting abdominal cramping and some nausea. He also noticed that his ostomy output decreased. He was given IV fluids as well as antibiotics and was started on OxyContin 10 mg twice a day along with p.r.n. oxycodone. Of note a few weeks prior he was started on short acting octreotide to decrease the ostomy output but he stopped taking it due to worsening of the abdominal cramping. A colonic stent was placed on 2/6/18.    He was recently hospitalized from 2/20-2/25/18 with presumed infectious colitis and bacteremia. His port was removed. He was discharged home on IV vancomycin. He resumed FOLFOX with dose reduced oxaliplatin due to previous neuropathy on 3/8/18. He was hospitalized with a bowel obstruction from 3/18-3/20/18 and a colonic stent was placed on 3/19/18. He comes in today for routine follow up prior to his next cycle of FOLFOX.    Interim history:    Patient here today for follow up accompanied by family member. He reports feeling improved in the last week. Current abdominal pain rated 1/10. He continues to take oxycodone 1 mg 2-3 times daily and Fentanyl 25 mcg/hr. He reports having pain in abdomen after chemotherapy rated 8/10 - he took oxycodone that was effective. He reports a good appetite with intake of 0369-6206 calories daily. He continues on 12 hrs TPN and daily IV fluids. He did have cold sensitivity in his hands as well as neuropathy after chemotherapy, that was stable  in intensity and longer duration. Denies mouth sores, but has sensitivity with hot and spicy foods, which he has been avoiding. Complaint of dry eyes for which he is using OTC refresh drops and ointment prn. He has not been taking Marinol as did not feel was effective, but plans to try again with dose increase of 2 tabs. Nausea reported, taking Zofran TID and effective. High ostomy output and changing Q 2 hrs. Continues to take opium tincture 4 times daily. Also taking karen root and peppermint oil once daily. Denies sweating at night.   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     Ulcerative colitis (H)      Past Surgical History:   Procedure Laterality Date     COLONOSCOPY  2017     COLONOSCOPY N/A 11/30/2017    Procedure: COLONOSCOPY;  Colonoscopy;  Surgeon: Rob Dudley MD;  Location: [facility]     COLONOSCOPY N/A 2/6/2018    Procedure: COMBINED COLONOSCOPY, STENT PLACEMENT;  COMBINED COLONOSCOPY with Colonic Stent Placement;  Surgeon: Guru Lionel Mar MD;  Location: [facility]     COLONOSCOPY N/A 3/19/2018    Procedure: COMBINED COLONOSCOPY, STENT PLACEMENT;  flexible sigmoidoscopy with stent placement and dilation;  Surgeon: Alexis Rios MD;  Location: [facility]     GI SURGERY  07/10/2017    Extensive lysis of adhesions, small bowel resection with end ileostomy.      HERNIA REPAIR       INSERT PORT VASCULAR ACCESS Right 8/10/2017    Procedure: INSERT PORT VASCULAR ACCESS;  Single Lumen Right Chest Power Port;  Surgeon: Malena Andrew PA-C;  [facility]     LAPAROSCOPY DIAGNOSTIC (GENERAL) N/A 12/6/2017    Procedure: LAPAROSCOPY DIAGNOSTIC (GENERAL);   Diagnostic Laparoscopy, Exploratory Laparotomy Anesthesia Block ;  Surgeon: Rob Dudley MD;  Location: [facility]     LAPAROTOMY EXPLORATORY N/A 12/6/2017    Procedure: LAPAROTOMY EXPLORATORY;;  Surgeon: Rob Dudley MD;  Location: [facility]     ORTHOPEDIC SURGERY Left     HIP ARTHROPLASTY     PICC INSERTION Right 02/23/2018    5Fr DL BioFlo PICC, 44cm (3cm external) in the R basilic vein w/ tip in the SVC RA junction      Left hip replacement.     Allergies:  Allergies as of 06/15/2018 - Julius as Reviewed 06/15/2018   Allergen Reaction Noted     Liquid adhesive Rash 03/01/2018     Current Medications:  Current Outpatient Prescriptions   Medication Sig Dispense Refill     amoxicillin (AMOXIL) 500 MG capsule        amylase-lipase-protease (CREON) 68843 UNITS CPEP Take 2 capsules (72,000 Units) by mouth 3 times daily (with meals) And 1 capsule with each snack tid. 240 capsule 3     diphenoxylate-atropine (LOMOTIL) 2.5-0.025 MG per tablet        doxycycline monohydrate 100 MG capsule        fentaNYL (DURAGESIC) 25 mcg/hr 72 hr patch Place 1 patch onto the skin every 72 hours remove old patch. 1 patch 0     Ferrous Sulfate (IRON SUPPLEMENT PO) Take 325 mg by mouth 2 times daily (with meals)        LORazepam (ATIVAN) 0.5 MG tablet Take 1 tablet (0.5 mg) by mouth every 4 hours as needed (Anxiety, Nausea/Vomiting or Sleep) 30 tablet 5     metroNIDAZOLE (FLAGYL) 500 MG tablet        Multiple Vitamins-Minerals (MULTIVITAMIN & MINERAL PO) Take 1 tablet by mouth daily       omeprazole (PRILOSEC) 20 MG CR capsule Take 1 capsule (20 mg) by mouth daily 30 capsule 11     ondansetron (ZOFRAN) 8 MG tablet        ondansetron (ZOFRAN-ODT) 8 MG ODT tab Take 1 tablet (8 mg) by mouth every 8 hours as needed for nausea 60 tablet 5     opium tincture 10 MG/ML (1%) liquid Take by mouth every 4 hours as needed for diarrhea or moderate pain       opium tincture 10 MG/ML (1%) liquid Take 0.6 mLs (6 mg) by mouth 4 times daily  72 mL 0     oxyCODONE IR (ROXICODONE) 10 MG tablet Take 0.5-1 tablets (5-10 mg) by mouth every 4 hours as needed for moderate to severe pain 90 tablet 0     parenteral nutrition - PTA/DISCHARGE ORDER Inject into the vein daily See medication history note from 2/20/18 for most recent TPN formula.       Potassium Chloride 40 MEQ/15ML (20%) SOLN Take 7.5 mLs (20 mEq) by mouth daily 52.5 mL 0     Psyllium (METAMUCIL FIBER) 51.7 % PACK Take 1 packet by mouth 3 times daily 90 each 3     dronabinol (MARINOL) 5 MG capsule Take 1 capsule (5 mg) by mouth 2 times daily (before meals) (Patient not taking: Reported on 5/18/2018) 60 capsule 0     naloxone (NARCAN) nasal spray Spray 1 spray (4 mg) into one nostril alternating nostrils as needed for opioid reversal every 2-3 minutes until assistance arrives (Patient not taking: Reported on 5/3/2018) 0.2 mL 0     Physical Exam:  General: The patient is a pleasant male in no acute distress. He is here today with his mother.   /72  Pulse 95  Temp 98.8  F (37.1  C)  Resp 16  Wt 69.2 kg (152 lb 9.6 oz)  SpO2 97%  BMI 22.54 kg/m2  Wt Readings from Last 10 Encounters:   06/15/18 69.2 kg (152 lb 9.6 oz)   05/31/18 68.1 kg (150 lb 3.2 oz)   05/18/18 68 kg (150 lb)   05/03/18 67.6 kg (149 lb)   04/19/18 65.2 kg (143 lb 11.2 oz)   04/05/18 65.3 kg (144 lb)   03/29/18 65 kg (143 lb 3.2 oz)   03/22/18 67.6 kg (149 lb)   03/19/18 72.2 kg (159 lb 3.2 oz)   03/08/18 69.2 kg (152 lb 8 oz)   HEENT: EOMI, PERRL. Sclerae are anicteric. Oral mucosa is pink and moist with no lesions or thrush.   Lymph: Neck is supple with no lymphadenopathy in the cervical or supraclavicular areas.   Heart: Regular rate and rhythm.   Lungs: Clear to auscultation bilaterally.   Abdomen: Bowel sounds present, soft, mild central abdominal tenderness with central firmness, ostomy bag is in right abdomen with liquid green stool.  Extremities: No lower extremity edema noted bilaterally.   Neuro: Cranial nerves II  through XII are grossly intact.  Skin: PICC line is in place in right arm.  No rashes or lesions on exposed skin.     Laboratory/Imaging Studies   6/15/2018 10:11   Sodium 133   Potassium 3.3 (L)   Chloride 92 (L)   Carbon Dioxide 25   Urea Nitrogen 32 (H)   Creatinine 1.03   GFR Estimate 75   GFR Estimate If Black >90   Calcium 9.1   Anion Gap 15 (H)   Magnesium 2.4 (H)   Phosphorus 4.5   Albumin 2.7 (L)   Protein Total 7.8   Bilirubin Total 0.7   Alkaline Phosphatase 182 (H)   ALT 33   AST 49 (H)   Glucose 94   WBC 9.6   Hemoglobin 10.5 (L)   Hematocrit 32.0 (L)   Platelet Count 317   RBC Count 3.80 (L)   MCV 84   MCH 27.6   MCHC 32.8   RDW 17.4 (H)   Diff Method Automated Method   % Neutrophils 62.6   % Lymphocytes 16.5   % Monocytes 16.4   % Eosinophils 3.2   % Basophils 0.8   % Immature Granulocytes 0.5   Nucleated RBCs 0   Absolute Neutrophil 6.0   Absolute Lymphocytes 1.6   Absolute Monocytes 1.6 (H)   Absolute Eosinophils 0.3   Absolute Basophils 0.1   Abs Immature Granulocytes 0.1   Absolute Nucleated RBC 0.0       NGS PANEL COLORECTAL    No mutations were identified in analyzed regions of KRAS, NRAS, BRAF, HRAS, or PIK3CA.     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  The sigmoid carcinoma is obstructing, requiring exploratory laparotomy and end ileostomy along with small bowel resection.    MSI intact and NGS panel does not reveal any identifiable mutations   He has been started on FOLFOX palliative chemotherapy. After 6 cycles, he had stable disease  On 12/6/17, he had Diagnostic laparoscopy and Exploratory laparotomy, but HIPEC was not performed as Peritoneal cancer index was 26 with diffuse involvement of the small bowel as well as the small bowel mesentery.   Repeat CT scan showed worsening peritoneal carcinomatosis and he is getting more symptomatic in terms of worsening abdominal pain and difficulty eating because of worsening abdominal cramping. He was  admitted to the hospital with worsening colon distention and colon stent was placed which improved his symptoms. Now he is doing much better.   Plan was to resume FOLFOX, but then he was hospitalized with infectious colitis and bacteremia. He recovered well from this and resumed FOLFOX on 3/8/18. Because of previous neuropathy, the dose of oxaliplatin was decreased to 70 mg/m . We may consider adding Avatin or anti-EGFR therapy in the future as well.     He was tolerating FOLFOX fairly well. However, cycle 3 was worse than the previous two due to fatigue, worsening neuropathy, and increasing ostomy output, so treatment was held due to patient preference. CT CAP on 5/3/18 showed mild improvement in his disease. Due to his progressive symptoms, the 5-FU bolus was held beginning with cycle 10. He is doing well today and will continue with cycle 13 FOLFOX today. He will follow up every 2 weeks prior to each cycle of chemotherapy and will have repeat imaging the end of June. He will call sooner for concerns. Scheduled for CT CAP 6/27/218. Follow-up with Dr. Long 6/28/218    Abdominal pain due to peritoneal carcinomatosis and colon obstruction. This is much better after placement of another colonic stent. Pain was initially worse with switching from OxyContin 20 mg bid to Fentanyl 12 mcg/hr. The increase to Fentanyl 25 mcg/hr on 4/3/18 has significantly helped his pain. He will continue with oxycodone prn, currently 10 mg bid-tid.    FEN. Patient remains on 12 hours TPN/day and is eating as tolerated. His weight is stable. Discussed with [facility] home infusion trying to taper his TPN. He has not found a benefit from 5 mg bid Marinol, but has not taken it consistently. Discussed trying to take it more consistently the first week of chemotherapy. He will increase the dose to 10 mg bid.     Hyponatremia.  Continue with 1L IV NS daily at home.  Discussed will continue with 1L IV NS daily at home when TPN taper, due to high ostomy  output.     High output ostomy. Will continue with tincture of opium qid.    History of iron deficient anemia. Patient received IV iron in the August 2018. Hemoglobin has stabilized. Iron studies in early March 2018 show replete iron stores. He will continue on oral iron.    Peripheral neuropathy. Stable. Secondary to oxaliplatin.  Will try acupuncture in June.         Patient seen in coordination with Linda Alves ELIF. Please see attestation.     Luz Elena LIU             Again, thank you for allowing me to participate in the care of your patient.      Sincerely,    Linda Alves PA-C

## 2018-06-15 NOTE — PATIENT INSTRUCTIONS
Potassium-Rich Foods  The normal adult diet usually contains 2,000 mg to 4,000 mg of potassium per day. More potassium is needed when you lose too much potassium from your body. This can happen if you have diarrhea or vomiting. It can also happen if you take a medicine to make you urinate more (diuretic). To increase the amount of potassium in your diet, include these high-potassium foods.     [The (*) indicates foods highest in potassium.]  Vegetables  Artichokes. Cooked 1/2 cup, 200 mg to 300 mg*  Asparagus. Cooked 1/2 cup, 200 mg to 300 mg  Beans. White, red, caisllas cooked 1/2 cup, 300 mg to 500 mg*  Beets. Cooked 1/2 cup, 200 mg to 300 mg  Broccoli. Cooked or raw 1 cup, 200 mg to 500 mg*  [employer] sprouts. Cooked 1/2 cup, 200 mg to 300 mg  Cabbage. Raw 1 cup, 100 mg to 200 mg  Carrots. Raw or cooked 1/2 cup, 100 mg to 200 mg  Celery. Raw 1 cup, 200 mg to 300 mg  Lima beans. Fresh or frozen 1/2 cup, 300 mg to 500 mg*   Mushrooms. Raw or cooked 1/2 cup, 100 mg to 300 mg  Peas. Cooked 1/2 cup, 150 mg to 250 mg   Potatoes. Baked 1 medium, 500 mg to 900 mg*   Spinach. Cooked 1 cup, 800 mg to 900 mg*   Spinach. Raw 2 cups, 300 mg to 400 mg *  Squash, winter. Fresh, frozen, or cooked 1/2 cup, 200 mg to 400 mg   Tomato. Fresh 1 medium, 200 mg to 300 mg   Tomato juice. Canned 1/2 cup, 200 mg to 300 mg   Fruits  Apple juice. Unsweetened 1 cup, 200 mg to 300 mg   Apricots. Canned 1/2 cup, 200 mg to 300 mg   Apricots. Dried 4 pieces, 100 mg to 200 mg   Avocado. Raw 1/2 cup, 300 mg to 400 mg*  Banana. Fresh 1 small, 300 mg to 400 mg*   Cantaloupe. Fresh 1 cup diced, 300 mg to 400 mg*   Grape juice. Unsweetened 1 cup, 200 mg to 300 mg   Honeydew melon. Fresh 1 cup diced, 300 mg to 400 mg*   Orange. Fresh 1 medium, 200 mg to 300 mg    Orange juice. Unsweetened, fresh or frozen 1/2 cup, 200 mg to 300 mg  Pineapple juice. Unsweetened 1 cup, 300 mg to 400 mg   Prune juice. Unsweetened 1/2 cup, 300 mg to 400 mg*   Prunes. Dried 5  pieces, 300 mg to 400 mg*   Strawberries. Fresh or frozen 1 cup, 200 mg to 300 mg  Meat  Red meat. Cooked 3 ounces, 100 mg to 300 mg   Seafood  Cod, flounder, halibut. Cooked 3 ounces, 100 mg to 300 mg*  [place]. Cooked, 3 ounces 300 mg to 400 mg*   Scallops. Cooked 3 ounces, 200 mg to 300 mg*  Shrimp. Cooked 3/4 cup, 100 mg to 200 mg   Tuna. Fresh or canned 3/4 cup, 200 mg to 500 mg   Date Last Reviewed: 10/1/2016    5927-4900 [employer]. [address] [place] [address], [place] [address]. All rights reserved. This information is not intended as a substitute for professional medical care. Always follow your healthcare professional's instructions.      [facility] Main Line: [phone]    Call triage nurse with chills and/or temperature greater than or equal to 100.4, uncontrolled nausea/vomiting, diarrhea, constipation, dizziness, shortness of breath, chest pain, bleeding, unexplained bruising, or any new/concerning symptoms, questions/concerns.   If you are having any concerning symptoms or wish to speak to a provider before your next infusion visit, please call your care coordinator or triage to notify them so we can adequately serve you.   Triage Nurse Line: [phone]    If after hours, weekends, or holidays [phone]               June 2018 Sunday Monday Tuesday Wednesday Thursday Friday Saturday                            1     2       3     4     5     6     7     8     9       10     11     12     13     14     15  Happy Birthday!     [facility] DRAW    9:30 AM   (15 min.)    [facility] DRAW   [facility] Lab Draw     [facility] RETURN    9:45 AM   (50 min.)   Linda Alves PA-C   [facility] ONC INFUSION 240   11:00 AM   (240 min.)    ONCOLOGY INFUSION   [facility] 16       17     18     19     20     21     22     23       24     25     26     27     [facility] DRAW   10:30 AM   (15 min.)   [facility]    [facility] Draw     CT CHEST/ABDOMEN/PELVIS W   11:20 AM   (20 min.)   [facility]2   [facility] CT 28     [facility] RETURN   12:45 PM   (30 min.)   Albert Long MD   [facility]     [facility] ONC INFUSION 240    1:30 PM   (240 min.)   [facility] ONCOLOGY INFUSION   [facility] 29 30 July 2018 Sunday Monday Tuesday Wednesday Thursday Friday Saturday   1     2     3     4     5     6     7       8     9     10     11     12     13     14       15     16     17     18     19     20     21       22     23     24     25     26     27     28       29     30     31                                      Lab Results:  Recent Results (from the past 12 hour(s))   Magnesium    Collection Time: 06/15/18 10:11 AM   Result Value Ref Range    Magnesium 2.4 (H) 1.6 - 2.3 mg/dL   Phosphorus    Collection Time: 06/15/18 10:11 AM   Result Value Ref Range    Phosphorus 4.5 2.5 - 4.5 mg/dL   CBC with platelets differential    Collection Time: 06/15/18 10:11 AM   Result Value Ref Range    WBC 9.6 4.0 - 11.0 10e9/L    RBC Count 3.80 (L) 4.4 - 5.9 10e12/L    Hemoglobin 10.5 (L) 13.3 - 17.7 g/dL    Hematocrit 32.0 (L) 40.0 - 53.0 %    MCV 84 78 - 100 fl    MCH 27.6 26.5 - 33.0 pg    MCHC 32.8 31.5 - 36.5 g/dL    RDW 17.4 (H) 10.0 - 15.0 %    Platelet Count 317 150 - 450 10e9/L    Diff Method Automated Method     % Neutrophils 62.6 %    % Lymphocytes 16.5 %    % Monocytes 16.4 %    % Eosinophils 3.2 %    % Basophils 0.8 %    % Immature Granulocytes 0.5 %    Nucleated RBCs 0 0 /100    Absolute Neutrophil 6.0 1.6 - 8.3 10e9/L    Absolute Lymphocytes 1.6 0.8 - 5.3 10e9/L    Absolute Monocytes 1.6 (H) 0.0 - 1.3 10e9/L    Absolute Eosinophils 0.3 0.0 - 0.7 10e9/L    Absolute Basophils 0.1 0.0 - 0.2 10e9/L    Abs Immature Granulocytes 0.1 0 - 0.4 10e9/L    Absolute Nucleated RBC 0.0    Comprehensive metabolic panel    Collection Time: 06/15/18 10:11 AM   Result Value Ref Range    Sodium 133  133 - 144 mmol/L    Potassium 3.3 (L) 3.4 - 5.3 mmol/L    Chloride 92 (L) 94 - 109 mmol/L    Carbon Dioxide 25 20 - 32 mmol/L    Anion Gap 15 (H) 3 - 14 mmol/L    Glucose 94 70 - 99 mg/dL    Urea Nitrogen 32 (H) 7 - 30 mg/dL    Creatinine 1.03 0.66 - 1.25 mg/dL    GFR Estimate 75 >60 mL/min/1.7m2    GFR Estimate If Black >90 >60 mL/min/1.7m2    Calcium 9.1 8.5 - 10.1 mg/dL    Bilirubin Total 0.7 0.2 - 1.3 mg/dL    Albumin 2.7 (L) 3.4 - 5.0 g/dL    Protein Total 7.8 6.8 - 8.8 g/dL    Alkaline Phosphatase 182 (H) 40 - 150 U/L    ALT 33 0 - 70 U/L    AST 49 (H) 0 - 45 U/L

## 2018-06-15 NOTE — MR AVS SNAPSHOT
After Visit Summary   6/15/2018    Sebas Lopez    MRN: [id number]           Patient Information     Date Of Birth          1965        Visit Information        Provider Department      6/15/2018 11:00 AM  12 ATC;  ONCOLOGY [facility]        Today's Diagnoses     Peritoneal carcinomatosis (H)    -  1    Cancer of sigmoid colon (H)          Care Instructions      Potassium-Rich Foods  The normal adult diet usually contains 2,000 mg to 4,000 mg of potassium per day. More potassium is needed when you lose too much potassium from your body. This can happen if you have diarrhea or vomiting. It can also happen if you take a medicine to make you urinate more (diuretic). To increase the amount of potassium in your diet, include these high-potassium foods.     [The (*) indicates foods highest in potassium.]  Vegetables  Artichokes. Cooked 1/2 cup, 200 mg to 300 mg*  Asparagus. Cooked 1/2 cup, 200 mg to 300 mg  Beans. White, red, casillas cooked 1/2 cup, 300 mg to 500 mg*  Beets. Cooked 1/2 cup, 200 mg to 300 mg  Broccoli. Cooked or raw 1 cup, 200 mg to 500 mg*  [place] sprouts. Cooked 1/2 cup, 200 mg to 300 mg  Cabbage. Raw 1 cup, 100 mg to 200 mg  Carrots. Raw or cooked 1/2 cup, 100 mg to 200 mg  Celery. Raw 1 cup, 200 mg to 300 mg  Lima beans. Fresh or frozen 1/2 cup, 300 mg to 500 mg*   Mushrooms. Raw or cooked 1/2 cup, 100 mg to 300 mg  Peas. Cooked 1/2 cup, 150 mg to 250 mg   Potatoes. Baked 1 medium, 500 mg to 900 mg*   Spinach. Cooked 1 cup, 800 mg to 900 mg*   Spinach. Raw 2 cups, 300 mg to 400 mg *  Squash, winter. Fresh, frozen, or cooked 1/2 cup, 200 mg to 400 mg   Tomato. Fresh 1 medium, 200 mg to 300 mg   Tomato juice. Canned 1/2 cup, 200 mg to 300 mg   Fruits  Apple juice. Unsweetened 1 cup, 200 mg to 300 mg   Apricots. Canned 1/2 cup, 200 mg to 300 mg   Apricots. Dried 4 pieces, 100 mg to 200 mg   Avocado. Raw 1/2 cup, 300 mg to 400 mg*  Banana. Fresh 1  small, 300 mg to 400 mg*   Cantaloupe. Fresh 1 cup diced, 300 mg to 400 mg*   Grape juice. Unsweetened 1 cup, 200 mg to 300 mg   Honeydew melon. Fresh 1 cup diced, 300 mg to 400 mg*   Orange. Fresh 1 medium, 200 mg to 300 mg    Orange juice. Unsweetened, fresh or frozen 1/2 cup, 200 mg to 300 mg  Pineapple juice. Unsweetened 1 cup, 300 mg to 400 mg   Prune juice. Unsweetened 1/2 cup, 300 mg to 400 mg*   Prunes. Dried 5 pieces, 300 mg to 400 mg*   Strawberries. Fresh or frozen 1 cup, 200 mg to 300 mg  Meat  Red meat. Cooked 3 ounces, 100 mg to 300 mg   Seafood  Cod, flounder, halibut. Cooked 3 ounces, 100 mg to 300 mg*  [place]. Cooked, 3 ounces 300 mg to 400 mg*   Scallops. Cooked 3 ounces, 200 mg to 300 mg*  Shrimp. Cooked 3/4 cup, 100 mg to 200 mg   Tuna. Fresh or canned 3/4 cup, 200 mg to 500 mg   Date Last Reviewed: 10/1/2016    5029-3174 [employer]. [address] [place] [address], [place] [address]. All rights reserved. This information is not intended as a substitute for professional medical care. Always follow your healthcare professional's instructions.      [facility] Main Line: [phone]    Call triage nurse with chills and/or temperature greater than or equal to 100.4, uncontrolled nausea/vomiting, diarrhea, constipation, dizziness, shortness of breath, chest pain, bleeding, unexplained bruising, or any new/concerning symptoms, questions/concerns.   If you are having any concerning symptoms or wish to speak to a provider before your next infusion visit, please call your care coordinator or triage to notify them so we can adequately serve you.   Triage Nurse Line: [phone]    If after hours, weekends, or holidays [phone]               June 2018 Sunday Monday Tuesday Wednesday Thursday Friday Saturday                            1     2       3     4     5     6     7     8     9       10     11     12     13     14     15  Happy Birthday!     [facility] DRAW    9:30  AM   (15 min.)   [facility]   [facility] Lab Draw     [facility] RETURN    9:45 AM   (50 min.)   Linda Alves PA-C   [facility]   11:00 AM   (240 min.)    ONCOLOGY INFUSION   [facility] 16       17     18     19     20     21     22     23       24     25     26     27     [facility]   10:30 AM   (15 min.)    [facility] DRAW   [facility] Lab Draw     CT CHEST/ABDOMEN/PELVIS W   11:20 AM   (20 min.)   UCCT2   [facility] CT 28     [facility] RETURN   12:45 PM   (30 min.)   Albert Long MD   [facility] ONC INFUSION 240    1:30 PM   (240 min.)    ONCOLOGY INFUSION   [facility] 29 30 July 2018 Sunday Monday Tuesday Wednesday Thursday Friday Saturday   1     2     3     4     5     6     7       8     9     10     11     12     13     14       15     16     17     18     19     20     21       22     23     24     25     26     27     28       29     30     31                                      Lab Results:  Recent Results (from the past 12 hour(s))   Magnesium    Collection Time: 06/15/18 10:11 AM   Result Value Ref Range    Magnesium 2.4 (H) 1.6 - 2.3 mg/dL   Phosphorus    Collection Time: 06/15/18 10:11 AM   Result Value Ref Range    Phosphorus 4.5 2.5 - 4.5 mg/dL   CBC with platelets differential    Collection Time: 06/15/18 10:11 AM   Result Value Ref Range    WBC 9.6 4.0 - 11.0 10e9/L    RBC Count 3.80 (L) 4.4 - 5.9 10e12/L    Hemoglobin 10.5 (L) 13.3 - 17.7 g/dL    Hematocrit 32.0 (L) 40.0 - 53.0 %    MCV 84 78 - 100 fl    MCH 27.6 26.5 - 33.0 pg    MCHC 32.8 31.5 - 36.5 g/dL    RDW 17.4 (H) 10.0 - 15.0 %    Platelet Count 317 150 - 450 10e9/L    Diff Method Automated Method     % Neutrophils 62.6 %    % Lymphocytes 16.5 %    % Monocytes 16.4 %    % Eosinophils 3.2 %    % Basophils 0.8 %    % Immature Granulocytes 0.5 %    Nucleated  RBCs 0 0 /100    Absolute Neutrophil 6.0 1.6 - 8.3 10e9/L    Absolute Lymphocytes 1.6 0.8 - 5.3 10e9/L    Absolute Monocytes 1.6 (H) 0.0 - 1.3 10e9/L    Absolute Eosinophils 0.3 0.0 - 0.7 10e9/L    Absolute Basophils 0.1 0.0 - 0.2 10e9/L    Abs Immature Granulocytes 0.1 0 - 0.4 10e9/L    Absolute Nucleated RBC 0.0    Comprehensive metabolic panel    Collection Time: 06/15/18 10:11 AM   Result Value Ref Range    Sodium 133 133 - 144 mmol/L    Potassium 3.3 (L) 3.4 - 5.3 mmol/L    Chloride 92 (L) 94 - 109 mmol/L    Carbon Dioxide 25 20 - 32 mmol/L    Anion Gap 15 (H) 3 - 14 mmol/L    Glucose 94 70 - 99 mg/dL    Urea Nitrogen 32 (H) 7 - 30 mg/dL    Creatinine 1.03 0.66 - 1.25 mg/dL    GFR Estimate 75 >60 mL/min/1.7m2    GFR Estimate If Black >90 >60 mL/min/1.7m2    Calcium 9.1 8.5 - 10.1 mg/dL    Bilirubin Total 0.7 0.2 - 1.3 mg/dL    Albumin 2.7 (L) 3.4 - 5.0 g/dL    Protein Total 7.8 6.8 - 8.8 g/dL    Alkaline Phosphatase 182 (H) 40 - 150 U/L    ALT 33 0 - 70 U/L    AST 49 (H) 0 - 45 U/L             Follow-ups after your visit        Your next 10 appointments already scheduled     Jun 27, 2018 10:30 AM CDT   [facility] Draw with  [facility] DRAW   [facility] Draw ([facility])    [address]  [address]  [facility] [address] [phone]            Jun 27, 2018 11:20 AM CDT   CT CHEST/ABDOMEN/PELVIS W CONTRAST with UCCT2   [facility] CT ([facility])    [address]  [address]  [place] [address] [phone]           Please bring any scans or X-rays taken at other hospitals, if similar tests were done. Also bring a list of your medicines, including vitamins, minerals and over-the-counter drugs. It is safest to leave personal items at home.  Be sure to tell your doctor:   If you have any allergies.   If there s any chance you are pregnant.   If you are breastfeeding.  How to prepare:   Do not eat or drink for 2  hours before your exam. If you need to take medicine, you may take it with small sips of water. (We may ask you to take liquid medicine as well.)   Please wear loose clothing, such as a sweat suit or jogging clothes. Avoid snaps, zippers and other metal. We may ask you to undress and put on a hospital gown.  Please arrive 30 minutes early for your CT. Once in the department you might be asked to drink water 15-20 minutes prior to your exam.  If indicated you may be asked to drink an oral contrast in advance of your CT.  If this is the case, the imaging team will let you know or be in contact with you prior to your appointment  Patients over 70 or patients with diabetes or kidney problems:   If you haven t had a blood test (creatinine test) within the last 30 days, the Cardiologist/Radiologist may require you to get this test prior to your exam.  If you have diabetes:   Continue to take your metformin medication on the day of your exam  If you have any questions, please call the Imaging Department where you will have your exam.            Jun 28, 2018  1:00 PM CDT   (Arrive by 12:45 PM)   Return Visit with Albert Long MD   [facility] ([facility])    [address] [place] [address]  [address]  [facility] [address] [phone]            Jun 28, 2018  1:30 PM CDT   Infusion 240 with  ONCOLOGY INFUSION, UC 32 ATC   [facility] ([facility])    [address] [place] [address]  [address]  [facility] [address] [phone]              Who to contact     If you have questions or need follow up information about today's clinic visit or your schedule please contact [facility] directly at [phone].  Normal or non-critical lab and imaging results will be communicated to you by MyChart, letter or phone within 4 business days after the clinic has received the results. If you do not hear from us within 7 days, please  contact the clinic through [employer] or phone. If you have a critical or abnormal lab result, we will notify you by phone as soon as possible.  Submit refill requests through [employer] or call your pharmacy and they will forward the refill request to us. Please allow 3 business days for your refill to be completed.          Additional Information About Your Visit        [facility] Information     [employer] gives you secure access to your electronic health record. If you see a primary care provider, you can also send messages to your care team and make appointments. If you have questions, please call your primary care clinic.  If you do not have a primary care provider, please call [phone] and they will assist you.        Care EveryWhere ID     This is your Care EveryWhere ID. This could be used by other organizations to access your [facility] medical records  [id number]         Blood Pressure from Last 3 Encounters:   06/15/18 102/72   05/31/18 104/70   05/18/18 103/73    Weight from Last 3 Encounters:   06/15/18 69.2 kg (152 lb 9.6 oz)   05/31/18 68.1 kg (150 lb 3.2 oz)   05/18/18 68 kg (150 lb)              We Performed the Following     CBC with platelets differential     Comprehensive metabolic panel     Magnesium     Phosphorus          Where to get your medicines      Some of these will need a paper prescription and others can be bought over the counter.  Ask your nurse if you have questions.     Bring a paper prescription for each of these medications     opium tincture 10 MG/ML (1%) liquid          Primary Care Provider Office Phone # Fax #    Jaguar Becker [phone]       [address] [address]        Equal Access to Services     [facility]: Hadii karrie lazar Soshu, waaxda luqadaha, qaybta kaalmada bhargav saucedo. So [facility] [phone].    ATENCIÓN: Si habla español, tiene a cid disposición servicios gratuitos de  asistencia lingüística. Kenan al [phone].    We comply with applicable federal civil rights laws and [place] laws. We do not discriminate on the basis of race, color, national origin, age, disability, sex, sexual orientation, or gender identity.            Thank you!     Thank you for choosing [facility]  for your care. Our goal is always to provide you with excellent care. Hearing back from our patients is one way we can continue to improve our services. Please take a few minutes to complete the written survey that you may receive in the mail after your visit with us. Thank you!             Your Updated Medication List - Protect others around you: Learn how to safely use, store and throw away your medicines at www.disposemymeds.org.          This list is accurate as of 6/15/18  2:15 PM.  Always use your most recent med list.                   Brand Name Dispense Instructions for use Diagnosis    amoxicillin 500 MG capsule    AMOXIL          amylase-lipase-protease 43349 units Cpep    CREON    240 capsule    Take 2 capsules (72,000 Units) by mouth 3 times daily (with meals) And 1 capsule with each snack tid.    Diarrhea, unspecified type, Peritoneal carcinomatosis (H), Cancer of sigmoid colon (H)       diphenoxylate-atropine 2.5-0.025 MG per tablet    LOMOTIL          doxycycline monohydrate 100 MG capsule           dronabinol 5 MG capsule    MARINOL    60 capsule    Take 1 capsule (5 mg) by mouth 2 times daily (before meals)    Peritoneal carcinomatosis (H), Anorexia       fentaNYL 25 mcg/hr 72 hr patch    DURAGESIC    1 patch    Place 1 patch onto the skin every 72 hours remove old patch.    Cancer of sigmoid colon (H)       IRON SUPPLEMENT PO      Take 325 mg by mouth 2 times daily (with meals)        LORazepam 0.5 MG tablet    ATIVAN    30 tablet    Take 1 tablet (0.5 mg) by mouth every 4 hours as needed (Anxiety, Nausea/Vomiting or Sleep)    Peritoneal carcinomatosis (H), Cancer of  sigmoid colon (H)       metroNIDAZOLE 500 MG tablet    FLAGYL          MULTIVITAMIN & MINERAL PO      Take 1 tablet by mouth daily        naloxone nasal spray    NARCAN    0.2 mL    Spray 1 spray (4 mg) into one nostril alternating nostrils as needed for opioid reversal every 2-3 minutes until assistance arrives    Cancer of sigmoid colon (H), Long term current use of opiate analgesic       omeprazole 20 MG CR capsule    priLOSEC    30 capsule    Take 1 capsule (20 mg) by mouth daily    Diarrhea, unspecified type       ondansetron 8 MG ODT tab    ZOFRAN-ODT    60 tablet    Take 1 tablet (8 mg) by mouth every 8 hours as needed for nausea    Nausea       ondansetron 8 MG tablet    ZOFRAN          * opium tincture 10 MG/ML (1%) liquid      Take by mouth every 4 hours as needed for diarrhea or moderate pain        * opium tincture 10 MG/ML (1%) liquid     72 mL    Take 0.6 mLs (6 mg) by mouth 4 times daily    Cancer of sigmoid colon (H), Diarrhea, unspecified type       oxyCODONE IR 10 MG tablet    ROXICODONE    90 tablet    Take 0.5-1 tablets (5-10 mg) by mouth every 4 hours as needed for moderate to severe pain    Cancer of sigmoid colon (H), Peritoneal carcinomatosis (H)       parenteral nutrition - PTA/DISCHARGE ORDER      Inject into the vein daily See medication history note from 2/20/18 for most recent TPN formula.        Potassium Chloride 40 MEQ/15ML (20%) Soln     52.5 mL    Take 7.5 mLs (20 mEq) by mouth daily        Psyllium 51.7 % Pack    METAMUCIL FIBER    90 each    Take 1 packet by mouth 3 times daily    Diarrhea, unspecified type       * Notice:  This list has 2 medication(s) that are the same as other medications prescribed for you. Read the directions carefully, and ask your doctor or other care provider to review them with you.

## 2018-06-15 NOTE — MR AVS SNAPSHOT
After Visit Summary   6/15/2018    Sebas Lopez    MRN: [id number]           Patient Information     Date Of Birth          1965        Visit Information        Provider Department      6/15/2018 10:00 AM Linda Alves PA-C [facility]        Today's Diagnoses     Cancer of sigmoid colon (H)    -  1    Diarrhea, unspecified type        Peritoneal carcinomatosis (H)           Follow-ups after your visit        Your next 10 appointments already scheduled     Jun 27, 2018 10:30 AM CDT   [facility] with  [facility]   [facility] ([facility])    [address]  [address]  [place] [address] [phone]            Jun 27, 2018 11:20 AM CDT   CT CHEST/ABDOMEN/PELVIS W CONTRAST with UCCT2   [facility] CT ([facility])    [address]  1st Floor  [place] [address] [phone]           Please bring any scans or X-rays taken at other hospitals, if similar tests were done. Also bring a list of your medicines, including vitamins, minerals and over-the-counter drugs. It is safest to leave personal items at home.  Be sure to tell your doctor:   If you have any allergies.   If there s any chance you are pregnant.   If you are breastfeeding.  How to prepare:   Do not eat or drink for 2 hours before your exam. If you need to take medicine, you may take it with small sips of water. (We may ask you to take liquid medicine as well.)   Please wear loose clothing, such as a sweat suit or jogging clothes. Avoid snaps, zippers and other metal. We may ask you to undress and put on a hospital gown.  Please arrive 30 minutes early for your CT. Once in the department you might be asked to drink water 15-20 minutes prior to your exam.  If indicated you may be asked to drink an oral contrast in advance of your CT.  If this is the case, the imaging team will let you know or  be in contact with you prior to your appointment  Patients over 70 or patients with diabetes or kidney problems:   If you haven t had a blood test (creatinine test) within the last 30 days, the Cardiologist/Radiologist may require you to get this test prior to your exam.  If you have diabetes:   Continue to take your metformin medication on the day of your exam  If you have any questions, please call the Imaging Department where you will have your exam.            Jun 28, 2018  1:00 PM CDT   (Arrive by 12:45 PM)   Return Visit with Albert Long MD   [facility] ([facility])    [address] [place] [address]  [address]  [facility] [address] [phone]            Jun 28, 2018  1:30 PM CDT   Infusion 240 with UC ONCOLOGY INFUSION, UC 32 ATC   [facility] ([facility])    [address] [place] [address]  [address]  [facility] [id number] [phone]              Who to contact     If you have questions or need follow up information about today's clinic visit or your schedule please contact [facility] directly at [phone].  Normal or non-critical lab and imaging results will be communicated to you by MyChart, letter or phone within 4 business days after the clinic has received the results. If you do not hear from us within 7 days, please contact the clinic through MyChart or phone. If you have a critical or abnormal lab result, we will notify you by phone as soon as possible.  Submit refill requests through [employer] or call your pharmacy and they will forward the refill request to us. Please allow 3 business days for your refill to be completed.          Additional Information About Your Visit        [facility] Information     [employer] gives you secure access to your electronic health record. If you see a primary care provider, you can also send messages to your care team and make appointments. If you have questions,  please call your primary care clinic.  If you do not have a primary care provider, please call [phone] and they will assist you.        Care EveryWhere ID     This is your Care EveryWhere ID. This could be used by other organizations to access your [facility] medical records  [id number]        Your Vitals Were     Pulse Temperature Respirations Pulse Oximetry BMI (Body Mass Index)       95 98.8  F (37.1  C) 16 97% 22.54 kg/m2        Blood Pressure from Last 3 Encounters:   06/15/18 102/72   05/31/18 104/70   05/18/18 103/73    Weight from Last 3 Encounters:   06/15/18 69.2 kg (152 lb 9.6 oz)   05/31/18 68.1 kg (150 lb 3.2 oz)   05/18/18 68 kg (150 lb)                 Where to get your medicines      Some of these will need a paper prescription and others can be bought over the counter.  Ask your nurse if you have questions.     Bring a paper prescription for each of these medications     opium tincture 10 MG/ML (1%) liquid          Primary Care Provider Office Phone # Fax #    Jaguar Becker [phone]       [facility] [address]  [facility] [id number]        Equal Access to Services     SARAH DUNN : Hadii karrie reddy hadasho Soomaali, waaxda luqadaha, qaybta kaalmada adeegyada, bhargav willis. So [facility] [phone].    ATENCIÓN: Si habla español, tiene a cid disposición servicios gratuitos de asistencia lingüística. Llame al [phone].    We comply with applicable federal civil rights laws and [place] laws. We do not discriminate on the basis of race, color, national origin, age, disability, sex, sexual orientation, or gender identity.            Thank you!     Thank you for choosing [facility]  for your care. Our goal is always to provide you with excellent care. Hearing back from our patients is one way we can continue to improve our services. Please take a few minutes to complete the written survey that you may receive in the mail  after your visit with us. Thank you!             Your Updated Medication List - Protect others around you: Learn how to safely use, store and throw away your medicines at www.disposemymeds.org.          This list is accurate as of 6/15/18 11:59 PM.  Always use your most recent med list.                   Brand Name Dispense Instructions for use Diagnosis    amoxicillin 500 MG capsule    AMOXIL          amylase-lipase-protease 83592 units Cpep    CREON    240 capsule    Take 2 capsules (72,000 Units) by mouth 3 times daily (with meals) And 1 capsule with each snack tid.    Diarrhea, unspecified type, Peritoneal carcinomatosis (H), Cancer of sigmoid colon (H)       diphenoxylate-atropine 2.5-0.025 MG per tablet    LOMOTIL          doxycycline monohydrate 100 MG capsule           dronabinol 5 MG capsule    MARINOL    60 capsule    Take 1 capsule (5 mg) by mouth 2 times daily (before meals)    Peritoneal carcinomatosis (H), Anorexia       fentaNYL 25 mcg/hr 72 hr patch    DURAGESIC    1 patch    Place 1 patch onto the skin every 72 hours remove old patch.    Cancer of sigmoid colon (H)       IRON SUPPLEMENT PO      Take 325 mg by mouth 2 times daily (with meals)        LORazepam 0.5 MG tablet    ATIVAN    30 tablet    Take 1 tablet (0.5 mg) by mouth every 4 hours as needed (Anxiety, Nausea/Vomiting or Sleep)    Peritoneal carcinomatosis (H), Cancer of sigmoid colon (H)       metroNIDAZOLE 500 MG tablet    FLAGYL          MULTIVITAMIN & MINERAL PO      Take 1 tablet by mouth daily        naloxone nasal spray    NARCAN    0.2 mL    Spray 1 spray (4 mg) into one nostril alternating nostrils as needed for opioid reversal every 2-3 minutes until assistance arrives    Cancer of sigmoid colon (H), Long term current use of opiate analgesic       omeprazole 20 MG CR capsule    priLOSEC    30 capsule    Take 1 capsule (20 mg) by mouth daily    Diarrhea, unspecified type       ondansetron 8 MG ODT tab    ZOFRAN-ODT    60 tablet     Take 1 tablet (8 mg) by mouth every 8 hours as needed for nausea    Nausea       ondansetron 8 MG tablet    ZOFRAN          * opium tincture 10 MG/ML (1%) liquid      Take by mouth every 4 hours as needed for diarrhea or moderate pain        * opium tincture 10 MG/ML (1%) liquid     72 mL    Take 0.6 mLs (6 mg) by mouth 4 times daily    Cancer of sigmoid colon (H), Diarrhea, unspecified type       oxyCODONE IR 10 MG tablet    ROXICODONE    90 tablet    Take 0.5-1 tablets (5-10 mg) by mouth every 4 hours as needed for moderate to severe pain    Cancer of sigmoid colon (H), Peritoneal carcinomatosis (H)       parenteral nutrition - PTA/DISCHARGE ORDER      Inject into the vein daily See medication history note from 2/20/18 for most recent TPN formula.        Potassium Chloride 40 MEQ/15ML (20%) Soln     52.5 mL    Take 7.5 mLs (20 mEq) by mouth daily        Psyllium 51.7 % Pack    METAMUCIL FIBER    90 each    Take 1 packet by mouth 3 times daily    Diarrhea, unspecified type       * Notice:  This list has 2 medication(s) that are the same as other medications prescribed for you. Read the directions carefully, and ask your doctor or other care provider to review them with you.

## 2018-06-15 NOTE — NURSING NOTE
Chief Complaint   Patient presents with     Labs Only     drawn from picc, heparin locked, vitals checked

## 2018-06-15 NOTE — PROGRESS NOTES
Infusion Nursing Note:  Sebas Lopez presents today for Cycle 13 Day 1 Oxaliplatin, Fluorouracil pump.    Patient seen by provider today: Yes: Linda DE LUNA   present during visit today: Not Applicable.    Note: TIANNA Cifuentes RN / Linda Alves today at 1121 - OK to treat without CMP results as machine is down.      TIANNA Cifuentes RN / Linda Alves at 1309  Replace K of 3.3 per protocol.    Intravenous Access:  PICC.    Treatment Conditions:  Lab Results   Component Value Date    HGB 10.5 06/15/2018     Lab Results   Component Value Date    WBC 9.6 06/15/2018      Lab Results   Component Value Date    ANEU 6.0 06/15/2018     Lab Results   Component Value Date     06/15/2018      Sodium 133  133 - 144 mmol/L Final 06/15/2018 10:11 AM 51   Potassium 3.3 (L) 3.4 - 5.3 mmol/L Final 06/15/2018 10:11 AM 51   Chloride 92 (L) 94 - 109 mmol/L Final 06/15/2018 10:11 AM 51   Carbon Dioxide 25  20 - 32 mmol/L Final 06/15/2018 10:11 AM 51   Anion Gap 15 (H) 3 - 14 mmol/L Final 06/15/2018 10:11 AM 51   Glucose 94  70 - 99 mg/dL Final 06/15/2018 10:11 AM 51   Urea Nitrogen 32 (H) 7 - 30 mg/dL Final 06/15/2018 10:11 AM 51   Creatinine 1.03  0.66 - 1.25 mg/dL Final 06/15/2018 10:11 AM 51   GFR Estimate 75  >60 mL/min/1.7m2 Final 06/15/2018 10:11 AM 51   Comment:   Non  GFR Calc   GFR Estimate If Black >90  >60 mL/min/1.7m2 Final 06/15/2018 10:11 AM 51   Comment:   African American GFR Calc   Calcium 9.1  8.5 - 10.1 mg/dL Final 06/15/2018 10:11 AM 51   Bilirubin Total 0.7  0.2 - 1.3 mg/dL Final 06/15/2018 10:11 AM 51   Albumin 2.7 (L) 3.4 - 5.0 g/dL Final 06/15/2018 10:11 AM 51   Protein Total 7.8  6.8 - 8.8 g/dL Final 06/15/2018 10:11 AM 51   Alkaline Phosphatase 182 (H) 40 - 150 U/L Final 06/15/2018 10:11 AM 51   ALT 33  0 - 70 U/L Final 06/15/2018 10:11 AM 51   AST 49 (H) 0 - 45 U/L Final 06/15/2018 10:11 AM        Results reviewed, labs MET treatment parameters, ok to  proceed with treatment.      Post Infusion Assessment:  Patient tolerated infusion without incident.  Blood return noted pre and post infusion.  Site patent and intact, free from redness, edema or discomfort.  No evidence of extravasations.  Fluorouracil C-Series pump infusing at 5.2 ml/hr x46 hours.  Connections taped, clamps taped open, capillary element taped down to skin with tegaderm.  Pump connections double checked by Carlita Price RN.   Notified Korin at [facility] with pump d/c date and time (Sunday at 1200).    Discharge Plan:   Patient declined prescription refills.  Copy of AVS reviewed with patient and/or family.  Patient will return 6/27/18 labs, CT and on 6/28 to see Dr. Long followed by chemo, for next appointments.  Patient discharged in stable condition accompanied by: mother.  Departure Mode: Ambulatory.  Face to Face time: 0.    Maribell Cifuentes RN

## 2018-06-18 NOTE — PROGRESS NOTES
This is a recent snapshot of the patient's [facility] medical record.  For current drug dose and complete information and questions, call [phone] or In [facility] pool, fv home infusion ([id number])  CSN Number:  [id number]

## 2018-06-21 NOTE — PROGRESS NOTES
This is a recent snapshot of the patient's [facility] medical record.  For current drug dose and complete information and questions, call [phone] or In B[facility] pool, fv home infusion ([id number])  CSN Number:  [id number]

## 2018-06-23 NOTE — PROGRESS NOTES
This is a recent snapshot of the patient's [facility] medical record.  For current drug dose and complete information and questions, call [phone] or In Basket pool, fv home infusion ([id number])  CSN Number:  [id number]

## 2018-06-27 NOTE — DISCHARGE INSTRUCTIONS

## 2018-06-27 NOTE — NURSING NOTE
Chief Complaint   Patient presents with     Labs Only     Collected via PICC line by RN, saline locked. Hx colon CA.

## 2018-06-28 NOTE — NURSING NOTE
"Oncology Rooming Note    June 28, 2018 12:54 PM   Sebas Lopez is a 53 year old male who presents for:    Chief Complaint   Patient presents with     Oncology Clinic Visit     Colon Cancer     Initial Vitals: /80  Pulse 104  Temp 97.4  F (36.3  C) (Oral)  Resp 16  Ht 1.753 m (5' 9\")  Wt 69.2 kg (152 lb 9.6 oz)  SpO2 100%  BMI 22.54 kg/m2 Estimated body mass index is 22.54 kg/(m^2) as calculated from the following:    Height as of this encounter: 1.753 m (5' 9\").    Weight as of this encounter: 69.2 kg (152 lb 9.6 oz). Body surface area is 1.84 meters squared.  Moderate Pain (5) Comment: Lower Abdomen   No LMP for male patient.  Allergies reviewed: Yes  Medications reviewed: Yes    Medications: Medication refills not needed today.  Pharmacy name entered into [facility]: [facility] [place] - [address]    Clinical concerns: No New Concerns    5 minutes for nursing intake (face to face time)     BRE Granda        "

## 2018-06-28 NOTE — PROGRESS NOTES
Oncology follow up visit:  Date on this visit: 6/28/2018        CC  sigmoid adenocarcinoma with peritoneal carcinomatosis- MSI- Intact KRAS wild type    Primary Physician: Waylon Han     History Of Present Illness:     Please see previous note for details. I have copied and updated from prior notes.   Sebas is a 53-year-old male who has a history of ulcerative colitis diagnosed more than 20 years ago, but he has not had medical management for that.  He was doing well, but in 05/2017 he presented with a few weeks of abdominal pain.  At that time, his imaging showed that he had a sigmoid wall thickening with adjacent mesenteric lymphadenopathy and free fluid near the abdominal wall mesh from his prior hernia surgery.  He subsequently underwent a colonoscopy which was incomplete and showed an obstructing sigmoid colon mass.  The biopsy from the sigmoid mass showed sigmoid adenocarcinoma.  He then developed obstructive symptoms and underwent exploratory laparotomy on 07/10/2017.  During that he was found to have diffuse peritoneal carcinomatosis.  Extensive lysis of adhesions was performed and a small bowel resection was performed with end ileostomy.  The biopsies from the multiple large peritoneal metastasis also were positive for metastatic adenocarcinoma.      He started palliative FOLFOX on 8/11/17. C#6 given on 10/26/17  After 6 cycles, he has stable disease    On 12/6/17, he had Diagnostic laparoscopy and Exploratory laparotomy, but HIPEC was not performed as Peritoneal cancer index was 26 with diffuse involvement of the small bowel as well as the small bowel mesentery.     He then presented to ED on 1/26 with abdominal pain and associated nausea. CT A/P on 1/26 with 5.2 x 4.5 x 3.6 cm proximal sigmoid mass causnig colonic obstriction with singificant distention of cecum (7cm in diameter), ascending colon and proximal transverse colon. No pneumatosis or pneumoperitoneum. Also small volume of ascites with  associated findings concerning for peritoneal carcinomatosis, increased from prior studies. Dr. Dudley was consulted and recommended no surgical intervention.    He was then seen in clinic on 1/29/2018 as well as yesterday because for the last few days he was unable to tolerate solid foods and he was getting abdominal cramping and some nausea. He also noticed that his ostomy output decreased. He was given IV fluids as well as antibiotics and yesterday he was started on OxyContin 10 mg twice a day along with p.r.n. oxycodone. Of note a few weeks ago he was started on short acting octreotide to decrease the ostomy output but he stopped taking it about one week ago and few days prior to that he noticed worsening of the abdominal cramping.      Repeat CT scan showed worsening peritoneal carcinomatosis and he is getting more symptomatic in terms of worsening abdominal pain and difficulty eating because of worsening abdominal cramping. He was admitted to the hospital with worsening colon distention and colon stent was placed which improved his symptoms.     He was recently hospitalized from 2/20-2/25/18 with presumed infectious colitis and bacteremia. His port was removed. He was discharged home on IV vancomycin via a PICC line which he completed on 3/7/2018.     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 He was hospitalized with a bowel obstruction from 3/18-3/20/18 and a colonic stent was placed on 3/19/18.  C#8 3/22/18 FOLFOX  C#9 4/5/18 FOLFOX      Repeat CT shows slightly improved disease and less ascites now.   Because of progressive fatigue, we stopped the 5-FU bolus and leucovorin and continued with the 5-FU infusion and oxaliplatin.     C#10 5/4/18  C#11 5/18/18  C#13 5/31/18  C#13 6/15/18    Repeat CT scan showed worsening peritoneal disease. There is also increased fluid in the proximal control on as well as post stent fluid in the rectum. This could be  consistent with colitis      Interim history  He tells me that over the last few days his abdominal pain is worse and he is taking fentanyl patch 25  g. In addition to that he is taking oxycodone on 4-5 times a day when previously he was using it at 10 mg 3 times a day. He feels nausea for which he takes Zofran. Ostomy is working well and he has not noticed worsening output. He continues to take tincture of opium 4 times a day. He has tingling and numbness mostly in the left hand and less so in the right hand. Feet are not that bad. He feels fatigued. He does not have a great appetite but he has not lost weight. He denies any other new pain. He tells me that over the last few days off and on he has had low-grade temperatures around 99.4-100. Denies any fevers cough or shortness of breath. His TPN is slowly being cut down. He is getting IV fluids at home. He denies any bleeding.      ECOG 2    ROS:  A comprehensive ROS was otherwise neg      I reviewed her history in Epic as below      Past Medical/Surgical History:  Past Medical History:   Diagnosis Date     Adenocarcinoma of sigmoid colon (H)     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     Ulcerative colitis (H)      Past Surgical History:   Procedure Laterality Date     COLONOSCOPY  2017     COLONOSCOPY N/A 11/30/2017    Procedure: COLONOSCOPY;  Colonoscopy;  Surgeon: Rob Dudley MD;  Location: [facility]     COLONOSCOPY N/A 2/6/2018    Procedure: COMBINED COLONOSCOPY, STENT PLACEMENT;  COMBINED COLONOSCOPY with Colonic Stent Placement;  Surgeon: Guru Lionel Mar MD;  Location: [facility]     COLONOSCOPY N/A 3/19/2018    Procedure: COMBINED COLONOSCOPY, STENT PLACEMENT;  flexible sigmoidoscopy with stent placement and dilation;  Surgeon:  Alexis Rios MD;  Location: [facility]     GI SURGERY  07/10/2017    Extensive lysis of adhesions, small bowel resection with end ileostomy.      HERNIA REPAIR       INSERT PORT VASCULAR ACCESS Right 8/10/2017    Procedure: INSERT PORT VASCULAR ACCESS;  Single Lumen Right Chest Power Port;  Surgeon: Malena Andrew PA-C;  Location: [facility]     LAPAROSCOPY DIAGNOSTIC (GENERAL) N/A 12/6/2017    Procedure: LAPAROSCOPY DIAGNOSTIC (GENERAL);  Diagnostic Laparoscopy, Exploratory Laparotomy Anesthesia Block ;  Surgeon: Rob Dudley MD;  Location: [facility]     LAPAROTOMY EXPLORATORY N/A 12/6/2017    Procedure: LAPAROTOMY EXPLORATORY;;  Surgeon: Rob Dudley MD;  Location: [facility]     ORTHOPEDIC SURGERY Left     HIP ARTHROPLASTY     PICC INSERTION Right 02/23/2018    5Fr DL BioFlo PICC, 44cm (3cm external) in the R basilic vein w/ tip in the SVC RA junction      Ulcerative colitis.  Left hip replacement.       Cancer History:   As above    Allergies:  Allergies as of 06/28/2018 - Julius as Reviewed 06/28/2018   Allergen Reaction Noted     Liquid adhesive Rash 03/01/2018     Current Medications:  Current Outpatient Prescriptions   Medication Sig Dispense Refill     amoxicillin (AMOXIL) 500 MG capsule        amylase-lipase-protease (CREON) 84245 UNITS CPEP Take 2 capsules (72,000 Units) by mouth 3 times daily (with meals) And 1 capsule with each snack tid. 240 capsule 3     diphenoxylate-atropine (LOMOTIL) 2.5-0.025 MG per tablet        fentaNYL (DURAGESIC) 25 mcg/hr 72 hr patch Place 1 patch onto the skin every 72 hours remove old patch. 1 patch 0     Ferrous Sulfate (IRON SUPPLEMENT PO) Take 325 mg by mouth 2 times daily (with meals)        levofloxacin (LEVAQUIN) 500 MG tablet Take 1 tablet (500 mg) by mouth daily 7 tablet 0     LORazepam (ATIVAN) 0.5 MG tablet Take 1 tablet (0.5 mg) by mouth every 4 hours as needed (Anxiety, Nausea/Vomiting or Sleep) 30 tablet 5     metroNIDAZOLE (FLAGYL) 500 MG  tablet Take 1 tablet (500 mg) by mouth 3 times daily for 7 days 21 tablet 0     metroNIDAZOLE (FLAGYL) 500 MG tablet        Multiple Vitamins-Minerals (MULTIVITAMIN & MINERAL PO) Take 1 tablet by mouth daily       omeprazole (PRILOSEC) 20 MG CR capsule Take 1 capsule (20 mg) by mouth daily 30 capsule 11     ondansetron (ZOFRAN) 8 MG tablet        ondansetron (ZOFRAN-ODT) 8 MG ODT tab Take 1 tablet (8 mg) by mouth every 8 hours as needed for nausea 60 tablet 5     opium tincture 10 MG/ML (1%) liquid Take 0.6 mLs (6 mg) by mouth 4 times daily 72 mL 0     opium tincture 10 MG/ML (1%) liquid Take by mouth every 4 hours as needed for diarrhea or moderate pain       oxyCODONE IR (ROXICODONE) 10 MG tablet Take 0.5-1 tablets (5-10 mg) by mouth every 4 hours as needed for moderate to severe pain 90 tablet 0     parenteral nutrition - PTA/DISCHARGE ORDER Inject into the vein daily See medication history note from 2/20/18 for most recent TPN formula.       Potassium Chloride 40 MEQ/15ML (20%) SOLN Take 7.5 mLs (20 mEq) by mouth daily 52.5 mL 0     Psyllium (METAMUCIL FIBER) 51.7 % PACK Take 1 packet by mouth 3 times daily 90 each 3     doxycycline monohydrate 100 MG capsule        dronabinol (MARINOL) 5 MG capsule Take 1 capsule (5 mg) by mouth 2 times daily (before meals) (Patient not taking: Reported on 5/18/2018) 60 capsule 0     naloxone (NARCAN) nasal spray Spray 1 spray (4 mg) into one nostril alternating nostrils as needed for opioid reversal every 2-3 minutes until assistance arrives (Patient not taking: Reported on 5/3/2018) 0.2 mL 0      Family History:  Family History   Problem Relation Age of Onset     Hypertension Father      Diabetes Father       No family history of cancer.  He does not have any kids.       Social History:  Social History     Social History     Marital status: Single     Spouse name: N/A     Number of children: N/A     Years of education: N/A     Occupational History     Not on file.     Social  "History Main Topics     Smoking status: Never Smoker     Smokeless tobacco: Never Used     Alcohol use 3.0 oz/week     5 Cans of beer per week      Comment: none for last 4 months     Drug use: No     Sexual activity: Not on file     Other Topics Concern     Not on file     Social History Narrative   He does not smoke and does not drink.  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  He lives with his girlfriend, Bessie.       Physical Exam:  /80  Pulse 104  Temp 97.4  F (36.3  C) (Oral)  Resp 16  Ht 1.753 m (5' 9\")  Wt 69.2 kg (152 lb 9.6 oz)  SpO2 100%  BMI 22.54 kg/m2  CONSTITUTIONAL: no acute distress  EYES: PERRLA, no palor or icterus.   ENT/MOUTH: no mouth lesions. Ears normal  CVS: s1s2 no m r g .   RESPIRATORY: clear to auscultation b/l  GI: Ostomy site is clean. He has some firmness around the left side of the ostomy tube and it is mildly tender to palpation but otherwise abdominal examination is benign  NEURO: AAOX3  mild tingling and numbness especially of the hands  INTEGUMENT: no obvious rashes  LYMPHATIC: no palpable cervical, supraclavicular, axillary or inguinal LAD  MUSCULOSKELETAL: Unremarkable. No bony tenderness.   EXTREMITIES: no edema  PSYCH: Mentation, mood and affect are normal. Decision making capacity is intact          Laboratory/Imaging Studies    EXAMINATION: CT CHEST/ABDOMEN/PELVIS W CONTRAST, 6/27/2018 11:19 AM     TECHNIQUE:  Helical CT images from the thoracic inlet through the  symphysis pubis were obtained  with contrast.     CONTRAST DOSE: Isovue 370  95 mls     COMPARISON: CT 5/3/2018, 2/20/2018, 2/5/2018     HISTORY: follow up of colon cancer; Cancer of sigmoid colon (H)     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 Status  post expiratory laparotomy and and ileostomy along with small bowel  resection, on FOLFOX palliative chemotherapy,  FINDINGS:     Chest:   Right arm PICC tip projects at the caval atrial junction.     Heart size is within " normal limits. No pericardial effusion. The  pulmonary artery and thoracic aorta are within normal limits.      No mediastinal and perihilar lymphadenopathy.      Central tracheobronchial tree is patent. Unchanged tiny right upper  lobe calcified nodule (4 image 27). No pleural effusion or  pneumothorax.     Abdomen and pelvis:  No significantly changed hepatic scalloping measuring approximately 7  mm in thickness along the anterior aspect (series 6 image 98).  Unremarkable gallbladder.  The spleen and adrenals are within normal  limits. Unremarkable pancreas. Unchanged bilateral renal cysts. No  hydronephrosis or renal calculus. Unremarkable urinary bladder.     Again seen sigmoid colonic stent. Increased proximal colonic fluid   with colon measuring up to 6 cm in maximal diameter . Increased post  stent fluid in the rectum as well. Partial small bowel resection with  right lower quadrant ileostomy. Again seen wall thickening involving  the colon. No significantly changed small bowel thickening     Slight interval increase in extent of diffuse peritoneal disease with  extensive peritoneal implants, for example near complete obliteration  of the pelvic fat planes on the series 3 image 490, and 436. Lower  anterior confluent ill-defined peritoneal masslike thickening measures  approximately 16.9 x 4.8 cm in maximal diameter (6 image 66).  Redemonstration of the sigmoid colon encasement which is slightly  increased. Again seen omental caking  on deep aspect of the anterior  abdominal mesh, slightly increased.     Bones and soft tissues:  Degenerative changes of the lumbar spine. No suspicious bone lesion is  seen. Old healed right clavicle fracture. Small sclerotic focus in the  posterior lateral right sixth rib, unchanged. Left hip total  arthroplasty. Unchanged lucent focus involving the left iliac bone         IMPRESSION:   In this patient with known history of extensive peritoneal  carcinomatosis associated with  sigmoid adenocarcinoma:  1a. Status post sigmoid stent. Increased colonic and rectal fluid  since CT 5/3/2018, favored to represent colitis.  1b. Redemonstration of extensive peritoneal carcinomatosis with near  obliteration of pelvic fat planes. Overall disease has slightly  progressed.  1c. No significant change hepatic scalloping.  2. No evidence of metastasis in the chest.        NGS PANEL COLORECTAL    No mutations were identified in analyzed regions of KRAS, NRAS, BRAF, HRAS, or PIK3CA.       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  The sigmoid carcinoma is obstructing, requiring exploratory laparotomy and end ileostomy along with small bowel resection.    MSI intact and NGS panel does not reveal any identifiable mutations     He has been started on FOLFOX palliative chemotherapy. After 6 cycles, he had stable disease      On 12/6/17, he had Diagnostic laparoscopy and Exploratory laparotomy, but HIPEC was not performed as Peritoneal cancer index was 26 with diffuse involvement of the small bowel as well as the small bowel mesentery.     Repeat CT scan showed worsening peritoneal carcinomatosis and he was getting more symptomatic in terms of worsening abdominal pain and difficulty eating because of worsening abdominal cramping. He was admitted to the hospital with worsening colon distention and colon stent was placed which improved his symptoms.     He was hospitalized from 2/20-2/25/18 with presumed infectious colitis and bacteremia. His port was removed. He was discharged home on IV vancomycin via PICC line. He completed vancomycin on 3/7/2018.    He resumed FOLFOX (C#7) with dose reduced oxaliplatin (70mg/m2) due to previous neuropathy on 3/8/18. He was hospitalized with a bowel obstruction from 3/18-3/20/18 and a colonic stent was placed on 3/19/18.  C#8 3/22/18 FOLFOX  C#9 4/5/18 FOLFOX        Repeat CT shows slightly improved disease and less ascites now.    Because of progressive fatigue, we stopped the 5-FU bolus and leucovorin and continued with the 5-FU infusion and oxaliplatin.     C#10 5/4/18  C#11 5/18/18  C#13 5/31/18  C#13 6/15/18    Repeat CT scan showed worsening peritoneal disease. There is also increased fluid in the proximal control on as well as post stent fluid in the rectum. This could be consistent with colitis.    We discussed that because of progression of the disease I would like to switch his treatment to irinotecan and panitumumab. We discussed the rationale, schedule and potential side effects of it in detail.  I would like to start in 7-10 days.    Abdominal pain due to peritoneal carcinomatosis. He also had worsening pain which could be related to colitis. He also has low-grade temperatures at home. I would like to give him 7 days of levofloxacin and Flagyl and delay chemotherapy by one week.  Continue fentanyl and oxycodone under the direction of palliative care.    Neuropathy. He has tingling and numbness especially of his hands and more so on the left side. This is due to oxaliplatin.  Now that the oxaliplatin will be on hold it should get better with time.    Nutrition. I discussed with him that at this time I would not like to further reduction in his TPN. He should continue to eat small frequent meals to maintain his nutrition and weight. He has not lost weight recently.    Anemia. Mild anemia. This is stable. Continue oral iron   Hemoglobin is stable. He will continue with oral iron    High ostomy output. More ostomy output. Currently he is taking tincture of opium 4 times a day. I also told him that he should take Imodium if he notices any increase in ostomy output. We can also give him Lomotil if need be. He is getting IV fluids at home and that is a possibility we have to increase the IV fluids when he is getting irinotecan.     I would like him to return to the clinic in about 1 week to see the nurse practitioner and hopefully at  that time we can start irinotecan and panitumumab        I answered all of his and his family's questions to their satisfaction. They're agreeable and comfortable with the plan.       Albert Long

## 2018-06-28 NOTE — MR AVS SNAPSHOT
After Visit Summary   6/28/2018    Sebas Lopez    MRN: [id number]           Patient Information     Date Of Birth          1965        Visit Information        Provider Department      6/28/2018 1:00 PM Albert Long MD [facility]        Today's Diagnoses     Colitis    -  1      Care Instructions    No chemo today    Change chemo to Irinotecan and Panitumumab. Schedule 7-10 days from now with NP/PA visit prior    Start levofloxacin 500 mg daily and flagyl 500 mg thrice daily              Follow-ups after your visit        Who to contact     If you have questions or need follow up information about today's clinic visit or your schedule please contact [facility] directly at [phone].  Normal or non-critical lab and imaging results will be communicated to you by [employer], letter or phone within 4 business days after the clinic has received the results. If you do not hear from us within 7 days, please contact the clinic through [employer] or phone. If you have a critical or abnormal lab result, we will notify you by phone as soon as possible.  Submit refill requests through [employer] or call your pharmacy and they will forward the refill request to us. Please allow 3 business days for your refill to be completed.          Additional Information About Your Visit        MyChart Information     [employer] gives you secure access to your electronic health record. If you see a primary care provider, you can also send messages to your care team and make appointments. If you have questions, please call your primary care clinic.  If you do not have a primary care provider, please call [phone] and they will assist you.        Care EveryWhere ID     This is your Care EveryWhere ID. This could be used by other organizations to access your [facility] medical records  [id number]        Your Vitals Were     Pulse Temperature Respirations Height Pulse Oximetry BMI  "(Body Mass Index)    104 97.4  F (36.3  C) (Oral) 16 1.753 m (5' 9\") 100% 22.54 kg/m2       Blood Pressure from Last 3 Encounters:   06/28/18 112/80   06/27/18 106/73   06/15/18 102/72    Weight from Last 3 Encounters:   06/28/18 69.2 kg (152 lb 9.6 oz)   06/27/18 70.5 kg (155 lb 8 oz)   06/15/18 69.2 kg (152 lb 9.6 oz)              Today, you had the following     No orders found for display         Today's Medication Changes          These changes are accurate as of 6/28/18  7:31 PM.  If you have any questions, ask your nurse or doctor.               Start taking these medicines.        Dose/Directions    levofloxacin 500 MG tablet   Commonly known as:  LEVAQUIN   Used for:  Colitis   Started by:  Albert Long MD        Dose:  500 mg   Take 1 tablet (500 mg) by mouth daily   Quantity:  7 tablet   Refills:  0         These medicines have changed or have updated prescriptions.        Dose/Directions    * metroNIDAZOLE 500 MG tablet   Commonly known as:  FLAGYL   This may have changed:  Another medication with the same name was added. Make sure you understand how and when to take each.   Changed by:  Albert Long MD        Refills:  0       * metroNIDAZOLE 500 MG tablet   Commonly known as:  FLAGYL   This may have changed:  You were already taking a medication with the same name, and this prescription was added. Make sure you understand how and when to take each.   Used for:  Colitis   Changed by:  Albert Long MD        Dose:  500 mg   Take 1 tablet (500 mg) by mouth 3 times daily for 7 days   Quantity:  21 tablet   Refills:  0       * Notice:  This list has 2 medication(s) that are the same as other medications prescribed for you. Read the directions carefully, and ask your doctor or other care provider to review them with you.         Where to get your medicines      These medications were sent to [place], [place] - [address] [place] [address]" [facility] [address]     Phone:  [phone]     levofloxacin 500 MG tablet    metroNIDAZOLE 500 MG tablet                Primary Care Provider Office Phone # Fax #    Jaguar Becker [phone]       [place] [facility] [address]  [facility] [address]        Equal Access to Services     [facility] : Hadii aad ku hadasho Soomaali, waaxda luqadaha, qaybta kaalmada adeegyada, waxay paigein haynovan trudy mercedesvijay sage . So [facility] [phone].    ATENCIÓN: Si habla español, tiene a cid disposición servicios gratuitos de asistencia lingüística. [facility] [phone].    We comply with applicable federal civil rights laws and [place] laws. We do not discriminate on the basis of race, color, national origin, age, disability, sex, sexual orientation, or gender identity.            Thank you!     Thank you for choosing [facility]  for your care. Our goal is always to provide you with excellent care. Hearing back from our patients is one way we can continue to improve our services. Please take a few minutes to complete the written survey that you may receive in the mail after your visit with us. Thank you!             Your Updated Medication List - Protect others around you: Learn how to safely use, store and throw away your medicines at www.disposemymeds.org.          This list is accurate as of 6/28/18  7:31 PM.  Always use your most recent med list.                   Brand Name Dispense Instructions for use Diagnosis    amoxicillin 500 MG capsule    AMOXIL          amylase-lipase-protease 95145 units Cpep    CREON    240 capsule    Take 2 capsules (72,000 Units) by mouth 3 times daily (with meals) And 1 capsule with each snack tid.    Diarrhea, unspecified type, Peritoneal carcinomatosis (H), Cancer of sigmoid colon (H)       diphenoxylate-atropine 2.5-0.025 MG per tablet    LOMOTIL          doxycycline monohydrate 100 MG capsule           dronabinol 5 MG capsule    MARINOL    60 capsule     Take 1 capsule (5 mg) by mouth 2 times daily (before meals)    Peritoneal carcinomatosis (H), Anorexia       fentaNYL 25 mcg/hr 72 hr patch    DURAGESIC    1 patch    Place 1 patch onto the skin every 72 hours remove old patch.    Cancer of sigmoid colon (H)       IRON SUPPLEMENT PO      Take 325 mg by mouth 2 times daily (with meals)        levofloxacin 500 MG tablet    LEVAQUIN    7 tablet    Take 1 tablet (500 mg) by mouth daily    Colitis       LORazepam 0.5 MG tablet    ATIVAN    30 tablet    Take 1 tablet (0.5 mg) by mouth every 4 hours as needed (Anxiety, Nausea/Vomiting or Sleep)    Peritoneal carcinomatosis (H), Cancer of sigmoid colon (H)       * metroNIDAZOLE 500 MG tablet    FLAGYL          * metroNIDAZOLE 500 MG tablet    FLAGYL    21 tablet    Take 1 tablet (500 mg) by mouth 3 times daily for 7 days    Colitis       MULTIVITAMIN & MINERAL PO      Take 1 tablet by mouth daily        naloxone nasal spray    NARCAN    0.2 mL    Spray 1 spray (4 mg) into one nostril alternating nostrils as needed for opioid reversal every 2-3 minutes until assistance arrives    Cancer of sigmoid colon (H), Long term current use of opiate analgesic       omeprazole 20 MG CR capsule    priLOSEC    30 capsule    Take 1 capsule (20 mg) by mouth daily    Diarrhea, unspecified type       ondansetron 8 MG ODT tab    ZOFRAN-ODT    60 tablet    Take 1 tablet (8 mg) by mouth every 8 hours as needed for nausea    Nausea       ondansetron 8 MG tablet    ZOFRAN          * opium tincture 10 MG/ML (1%) liquid      Take by mouth every 4 hours as needed for diarrhea or moderate pain        * opium tincture 10 MG/ML (1%) liquid     72 mL    Take 0.6 mLs (6 mg) by mouth 4 times daily    Cancer of sigmoid colon (H), Diarrhea, unspecified type       oxyCODONE IR 10 MG tablet    ROXICODONE    90 tablet    Take 0.5-1 tablets (5-10 mg) by mouth every 4 hours as needed for moderate to severe pain    Cancer of sigmoid colon (H), Peritoneal  carcinomatosis (H)       parenteral nutrition - PTA/DISCHARGE ORDER      Inject into the vein daily See medication history note from 2/20/18 for most recent TPN formula.        Potassium Chloride 40 MEQ/15ML (20%) Soln     52.5 mL    Take 7.5 mLs (20 mEq) by mouth daily        Psyllium 51.7 % Pack    METAMUCIL FIBER    90 each    Take 1 packet by mouth 3 times daily    Diarrhea, unspecified type       * Notice:  This list has 4 medication(s) that are the same as other medications prescribed for you. Read the directions carefully, and ask your doctor or other care provider to review them with you.

## 2018-06-28 NOTE — PATIENT INSTRUCTIONS
No chemo today    Change chemo to Irinotecan and Panitumumab. Schedule 7-10 days from now with NP/PA visit prior    Start levofloxacin 500 mg daily and flagyl 500 mg thrice daily

## 2018-06-28 NOTE — LETTER
6/28/2018       RE: Sebas Lopez  [address] [place] [address]     Dear Colleague,    Thank you for referring your patient, Sebas Lopez, to the [facility]. Please see a copy of my visit note below.    Oncology follow up visit:  Date on this visit: 6/28/2018        CC  sigmoid adenocarcinoma with peritoneal carcinomatosis- MSI- Intact KRAS wild type    Primary Physician: Waylon Han     History Of Present Illness:     Please see previous note for details. I have copied and updated from prior notes.   Sebas is a 53-year-old male who has a history of ulcerative colitis diagnosed more than 20 years ago, but he has not had medical management for that.  He was doing well, but in 05/2017 he presented with a few weeks of abdominal pain.  At that time, his imaging showed that he had a sigmoid wall thickening with adjacent mesenteric lymphadenopathy and free fluid near the abdominal wall mesh from his prior hernia surgery.  He subsequently underwent a colonoscopy which was incomplete and showed an obstructing sigmoid colon mass.  The biopsy from the sigmoid mass showed sigmoid adenocarcinoma.  He then developed obstructive symptoms and underwent exploratory laparotomy on 07/10/2017.  During that he was found to have diffuse peritoneal carcinomatosis.  Extensive lysis of adhesions was performed and a small bowel resection was performed with end ileostomy.  The biopsies from the multiple large peritoneal metastasis also were positive for metastatic adenocarcinoma.      He started palliative FOLFOX on 8/11/17. C#6 given on 10/26/17  After 6 cycles, he has stable disease    On 12/6/17, he had Diagnostic laparoscopy and Exploratory laparotomy, but HIPEC was not performed as Peritoneal cancer index was 26 with diffuse involvement of the small bowel as well as the small bowel mesentery.     He then presented to ED on 1/26 with abdominal pain and associated nausea. CT A/P on 1/26 with  5.2 x 4.5 x 3.6 cm proximal sigmoid mass causnig colonic obstriction with singificant distention of cecum (7cm in diameter), ascending colon and proximal transverse colon. No pneumatosis or pneumoperitoneum. Also small volume of ascites with associated findings concerning for peritoneal carcinomatosis, increased from prior studies. Dr. Dudley was consulted and recommended no surgical intervention.    He was then seen in clinic on 1/29/2018 as well as yesterday because for the last few days he was unable to tolerate solid foods and he was getting abdominal cramping and some nausea. He also noticed that his ostomy output decreased. He was given IV fluids as well as antibiotics and yesterday he was started on OxyContin 10 mg twice a day along with p.r.n. oxycodone. Of note a few weeks ago he was started on short acting octreotide to decrease the ostomy output but he stopped taking it about one week ago and few days prior to that he noticed worsening of the abdominal cramping.      Repeat CT scan showed worsening peritoneal carcinomatosis and he is getting more symptomatic in terms of worsening abdominal pain and difficulty eating because of worsening abdominal cramping. He was admitted to the hospital with worsening colon distention and colon stent was placed which improved his symptoms.     He was recently hospitalized from 2/20-2/25/18 with presumed infectious colitis and bacteremia. His port was removed. He was discharged home on IV vancomycin via a PICC line which he completed on 3/7/2018.     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 He was hospitalized with a bowel obstruction from 3/18-3/20/18 and a colonic stent was placed on 3/19/18.  C#8 3/22/18 FOLFOX  C#9 4/5/18 FOLFOX      Repeat CT shows slightly improved disease and less ascites now.   Because of progressive fatigue, we stopped the 5-FU bolus and leucovorin and continued with the 5-FU infusion and  oxaliplatin.     C#10 5/4/18  C#11 5/18/18  C#13 5/31/18  C#13 6/15/18    Repeat CT scan showed worsening peritoneal disease. There is also increased fluid in the proximal control on as well as post stent fluid in the rectum. This could be consistent with colitis      Interim history  He tells me that over the last few days his abdominal pain is worse and he is taking fentanyl patch 25  g. In addition to that he is taking oxycodone on 4-5 times a day when previously he was using it at 10 mg 3 times a day. He feels nausea for which he takes Zofran. Ostomy is working well and he has not noticed worsening output. He continues to take tincture of opium 4 times a day. He has tingling and numbness mostly in the left hand and less so in the right hand. Feet are not that bad. He feels fatigued. He does not have a great appetite but he has not lost weight. He denies any other new pain. He tells me that over the last few days off and on he has had low-grade temperatures around 99.4-100. Denies any fevers cough or shortness of breath. His TPN is slowly being cut down. He is getting IV fluids at home. He denies any bleeding.      ECOG 2    ROS:  A comprehensive ROS was otherwise neg      I reviewed her history in Epic as below      Past Medical/Surgical History:  Past Medical History:   Diagnosis Date     Adenocarcinoma of sigmoid colon (H)     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     Ulcerative colitis (H)      Past Surgical History:   Procedure Laterality Date     COLONOSCOPY  2017     COLONOSCOPY N/A 11/30/2017    Procedure: COLONOSCOPY;  Colonoscopy;  Surgeon: Rob Dudley MD;  Location: [facility]     COLONOSCOPY N/A 2/6/2018    Procedure: COMBINED COLONOSCOPY, STENT PLACEMENT;  COMBINED COLONOSCOPY with Colonic  Stent Placement;  Surgeon: Guru Lionel Mar MD;  Location: [facility]     COLONOSCOPY N/A 3/19/2018    Procedure: COMBINED COLONOSCOPY, STENT PLACEMENT;  flexible sigmoidoscopy with stent placement and dilation;  Surgeon: Alexis Rios MD;  Location: [facility]     GI SURGERY  07/10/2017    Extensive lysis of adhesions, small bowel resection with end ileostomy.      HERNIA REPAIR       INSERT PORT VASCULAR ACCESS Right 8/10/2017    Procedure: INSERT PORT VASCULAR ACCESS;  Single Lumen Right Chest Power Port;  Surgeon: Malena Andrew PA-C;  [facility]     LAPAROSCOPY DIAGNOSTIC (GENERAL) N/A 12/6/2017    Procedure: LAPAROSCOPY DIAGNOSTIC (GENERAL);  Diagnostic Laparoscopy, Exploratory Laparotomy Anesthesia Block ;  Surgeon: Rob Dudley MD;  Location: [facility]     LAPAROTOMY EXPLORATORY N/A 12/6/2017    Procedure: LAPAROTOMY EXPLORATORY;;  Surgeon: Rob Dudley MD;  Location: [facility]     ORTHOPEDIC SURGERY Left     HIP ARTHROPLASTY     PICC INSERTION Right 02/23/2018    5Fr DL BioFlo PICC, 44cm (3cm external) in the R basilic vein w/ tip in the SVC RA junction      Ulcerative colitis.  Left hip replacement.       Cancer History:   As above    Allergies:  Allergies as of 06/28/2018 - Julius as Reviewed 06/28/2018   Allergen Reaction Noted     Liquid adhesive Rash 03/01/2018     Current Medications:  Current Outpatient Prescriptions   Medication Sig Dispense Refill     amoxicillin (AMOXIL) 500 MG capsule        amylase-lipase-protease (CREON) 74921 UNITS CPEP Take 2 capsules (72,000 Units) by mouth 3 times daily (with meals) And 1 capsule with each snack tid. 240 capsule 3     diphenoxylate-atropine (LOMOTIL) 2.5-0.025 MG per tablet        fentaNYL (DURAGESIC) 25 mcg/hr 72 hr patch Place 1 patch onto the skin every 72 hours remove old patch. 1 patch 0     Ferrous Sulfate (IRON SUPPLEMENT PO) Take 325 mg by mouth 2 times daily (with meals)        levofloxacin (LEVAQUIN) 500 MG  tablet Take 1 tablet (500 mg) by mouth daily 7 tablet 0     LORazepam (ATIVAN) 0.5 MG tablet Take 1 tablet (0.5 mg) by mouth every 4 hours as needed (Anxiety, Nausea/Vomiting or Sleep) 30 tablet 5     metroNIDAZOLE (FLAGYL) 500 MG tablet Take 1 tablet (500 mg) by mouth 3 times daily for 7 days 21 tablet 0     metroNIDAZOLE (FLAGYL) 500 MG tablet        Multiple Vitamins-Minerals (MULTIVITAMIN & MINERAL PO) Take 1 tablet by mouth daily       omeprazole (PRILOSEC) 20 MG CR capsule Take 1 capsule (20 mg) by mouth daily 30 capsule 11     ondansetron (ZOFRAN) 8 MG tablet        ondansetron (ZOFRAN-ODT) 8 MG ODT tab Take 1 tablet (8 mg) by mouth every 8 hours as needed for nausea 60 tablet 5     opium tincture 10 MG/ML (1%) liquid Take 0.6 mLs (6 mg) by mouth 4 times daily 72 mL 0     opium tincture 10 MG/ML (1%) liquid Take by mouth every 4 hours as needed for diarrhea or moderate pain       oxyCODONE IR (ROXICODONE) 10 MG tablet Take 0.5-1 tablets (5-10 mg) by mouth every 4 hours as needed for moderate to severe pain 90 tablet 0     parenteral nutrition - PTA/DISCHARGE ORDER Inject into the vein daily See medication history note from 2/20/18 for most recent TPN formula.       Potassium Chloride 40 MEQ/15ML (20%) SOLN Take 7.5 mLs (20 mEq) by mouth daily 52.5 mL 0     Psyllium (METAMUCIL FIBER) 51.7 % PACK Take 1 packet by mouth 3 times daily 90 each 3     doxycycline monohydrate 100 MG capsule        dronabinol (MARINOL) 5 MG capsule Take 1 capsule (5 mg) by mouth 2 times daily (before meals) (Patient not taking: Reported on 5/18/2018) 60 capsule 0     naloxone (NARCAN) nasal spray Spray 1 spray (4 mg) into one nostril alternating nostrils as needed for opioid reversal every 2-3 minutes until assistance arrives (Patient not taking: Reported on 5/3/2018) 0.2 mL 0      Family History:  Family History   Problem Relation Age of Onset     Hypertension Father      Diabetes Father       No family history of cancer.  He does  "not have any kids.       Social History:  Social History     Social History     Marital status: Single     Spouse name: N/A     Number of children: N/A     Years of education: N/A     Occupational History     Not on file.     Social History Main Topics     Smoking status: Never Smoker     Smokeless tobacco: Never Used     Alcohol use 3.0 oz/week     5 Cans of beer per week      Comment: none for last 4 months     Drug use: No     Sexual activity: Not on file     Other Topics Concern     Not on file     Social History Narrative   He does not smoke and does not drink.  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  He lives with his girlfriend, Bessie.       Physical Exam:  /80  Pulse 104  Temp 97.4  F (36.3  C) (Oral)  Resp 16  Ht 1.753 m (5' 9\")  Wt 69.2 kg (152 lb 9.6 oz)  SpO2 100%  BMI 22.54 kg/m2  CONSTITUTIONAL: no acute distress  EYES: PERRLA, no palor or icterus.   ENT/MOUTH: no mouth lesions. Ears normal  CVS: s1s2 no m r g .   RESPIRATORY: clear to auscultation b/l  GI: Ostomy site is clean. He has some firmness around the left side of the ostomy tube and it is mildly tender to palpation but otherwise abdominal examination is benign  NEURO: AAOX3  mild tingling and numbness especially of the hands  INTEGUMENT: no obvious rashes  LYMPHATIC: no palpable cervical, supraclavicular, axillary or inguinal LAD  MUSCULOSKELETAL: Unremarkable. No bony tenderness.   EXTREMITIES: no edema  PSYCH: Mentation, mood and affect are normal. Decision making capacity is intact          Laboratory/Imaging Studies    EXAMINATION: CT CHEST/ABDOMEN/PELVIS W CONTRAST, 6/27/2018 11:19 AM     TECHNIQUE:  Helical CT images from the thoracic inlet through the  symphysis pubis were obtained  with contrast.     CONTRAST DOSE: Isovue 370  95 mls     COMPARISON: CT 5/3/2018, 2/20/2018, 2/5/2018     HISTORY: follow up of colon cancer; Cancer of sigmoid colon (H)     Stage IV sigmoid adenocarcinoma with peritoneal " metastasis. Status  post expiratory laparotomy and and ileostomy along with small bowel  resection, on FOLFOX palliative chemotherapy,  FINDINGS:     Chest:   Right arm PICC tip projects at the caval atrial junction.     Heart size is within normal limits. No pericardial effusion. The  pulmonary artery and thoracic aorta are within normal limits.      No mediastinal and perihilar lymphadenopathy.      Central tracheobronchial tree is patent. Unchanged tiny right upper  lobe calcified nodule (4 image 27). No pleural effusion or  pneumothorax.     Abdomen and pelvis:  No significantly changed hepatic scalloping measuring approximately 7  mm in thickness along the anterior aspect (series 6 image 98).  Unremarkable gallbladder.  The spleen and adrenals are within normal  limits. Unremarkable pancreas. Unchanged bilateral renal cysts. No  hydronephrosis or renal calculus. Unremarkable urinary bladder.     Again seen sigmoid colonic stent. Increased proximal colonic fluid   with colon measuring up to 6 cm in maximal diameter . Increased post  stent fluid in the rectum as well. Partial small bowel resection with  right lower quadrant ileostomy. Again seen wall thickening involving  the colon. No significantly changed small bowel thickening     Slight interval increase in extent of diffuse peritoneal disease with  extensive peritoneal implants, for example near complete obliteration  of the pelvic fat planes on the series 3 image 490, and 436. Lower  anterior confluent ill-defined peritoneal masslike thickening measures  approximately 16.9 x 4.8 cm in maximal diameter (6 image 66).  Redemonstration of the sigmoid colon encasement which is slightly  increased. Again seen omental caking  on deep aspect of the anterior  abdominal mesh, slightly increased.     Bones and soft tissues:  Degenerative changes of the lumbar spine. No suspicious bone lesion is  seen. Old healed right clavicle fracture. Small sclerotic focus in  the  posterior lateral right sixth rib, unchanged. Left hip total  arthroplasty. Unchanged lucent focus involving the left iliac bone         IMPRESSION:   In this patient with known history of extensive peritoneal  carcinomatosis associated with sigmoid adenocarcinoma:  1a. Status post sigmoid stent. Increased colonic and rectal fluid  since CT 5/3/2018, favored to represent colitis.  1b. Redemonstration of extensive peritoneal carcinomatosis with near  obliteration of pelvic fat planes. Overall disease has slightly  progressed.  1c. No significant change hepatic scalloping.  2. No evidence of metastasis in the chest.        NGS PANEL COLORECTAL    No mutations were identified in analyzed regions of KRAS, NRAS, BRAF, HRAS, or PIK3CA.       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  The sigmoid carcinoma is obstructing, requiring exploratory laparotomy and end ileostomy along with small bowel resection.    MSI intact and NGS panel does not reveal any identifiable mutations     He has been started on FOLFOX palliative chemotherapy. After 6 cycles, he had stable disease      On 12/6/17, he had Diagnostic laparoscopy and Exploratory laparotomy, but HIPEC was not performed as Peritoneal cancer index was 26 with diffuse involvement of the small bowel as well as the small bowel mesentery.     Repeat CT scan showed worsening peritoneal carcinomatosis and he was getting more symptomatic in terms of worsening abdominal pain and difficulty eating because of worsening abdominal cramping. He was admitted to the hospital with worsening colon distention and colon stent was placed which improved his symptoms.     He was hospitalized from 2/20-2/25/18 with presumed infectious colitis and bacteremia. His port was removed. He was discharged home on IV vancomycin via PICC line. He completed vancomycin on 3/7/2018.    He resumed FOLFOX (C#7) with dose reduced oxaliplatin (70mg/m2) due to  previous neuropathy on 3/8/18. He was hospitalized with a bowel obstruction from 3/18-3/20/18 and a colonic stent was placed on 3/19/18.  C#8 3/22/18 FOLFOX  C#9 4/5/18 FOLFOX        Repeat CT shows slightly improved disease and less ascites now.   Because of progressive fatigue, we stopped the 5-FU bolus and leucovorin and continued with the 5-FU infusion and oxaliplatin.     C#10 5/4/18  C#11 5/18/18  C#13 5/31/18  C#13 6/15/18    Repeat CT scan showed worsening peritoneal disease. There is also increased fluid in the proximal control on as well as post stent fluid in the rectum. This could be consistent with colitis.    We discussed that because of progression of the disease I would like to switch his treatment to irinotecan and panitumumab. We discussed the rationale, schedule and potential side effects of it in detail.  I would like to start in 7-10 days.    Abdominal pain due to peritoneal carcinomatosis. He also had worsening pain which could be related to colitis. He also has low-grade temperatures at home. I would like to give him 7 days of levofloxacin and Flagyl and delay chemotherapy by one week.  Continue fentanyl and oxycodone under the direction of palliative care.    Neuropathy. He has tingling and numbness especially of his hands and more so on the left side. This is due to oxaliplatin.  Now that the oxaliplatin will be on hold it should get better with time.    Nutrition. I discussed with him that at this time I would not like to further reduction in his TPN. He should continue to eat small frequent meals to maintain his nutrition and weight. He has not lost weight recently.    Anemia. Mild anemia. This is stable. Continue oral iron   Hemoglobin is stable. He will continue with oral iron    High ostomy output. More ostomy output. Currently he is taking tincture of opium 4 times a day. I also told him that he should take Imodium if he notices any increase in ostomy output. We can also give him  Lomotil if need be. He is getting IV fluids at home and that is a possibility we have to increase the IV fluids when he is getting irinotecan.     I would like him to return to the clinic in about 1 week to see the nurse practitioner and hopefully at that time we can start irinotecan and panitumumab        I answered all of his and his family's questions to their satisfaction. They're agreeable and comfortable with the plan.       Albert Long

## 2018-06-28 NOTE — PROGRESS NOTES
Met with patient, and family, to discuss new chemotherapy. Provided pt with [facility] printouts on irinotecan and panitumumab. Reviewed administration, side effects and ongoing symptom support management. Discussed that chemo may be delayed due to blood counts, infusion schedule or patient s need to modify. Reviewed most concerning symptoms that warrant an immediate call to the clinic nurse line.

## 2018-07-02 NOTE — TELEPHONE ENCOUNTER
[facility] Telephone Triage Note    Assessment: Patient called in to triage reporting the following symptoms: intermittent abdominal pain, continuing from last week. He has taken his prescribed antibiotics daily, and is maxing out on his pain medication. It is crampy pain. He no longer has low grade temps, last measured at 98.4 and 98.6.    Recommendations: Discussed with Dr. Long.  Patient should be seen in clinic today or tomorrow..    Follow-Up: Patient scheduled to see Chiquis Negro before infusion on Friday, scheduled to see her tomorrow afternoon with labs before. Patient verbalized understanding.    Camilla Gillette RN

## 2018-07-03 PROBLEM — C18.9 COLON ADENOCARCINOMA (H): Status: ACTIVE | Noted: 2018-01-01

## 2018-07-03 NOTE — PROGRESS NOTES
This is a recent snapshot of the patient's [facility] medical record.  For current drug dose and complete information and questions, call [phone] or In Basket pool, [facility] home infusion ([id number])  CSN Number:  [id number]

## 2018-07-03 NOTE — ED TRIAGE NOTES
Pt presents ambulatory to triage with c/o abdominal pain/bloating that has been getting worse over the last 2 weeks. Hx colon ca, last treatment 3 weeks ago. Pt states he is taking pain medication but they are no longer working. Pt reports regular output from ileostomy.

## 2018-07-03 NOTE — ED NOTES
[facility], [place]   ED Nurse to Floor Handoff     Sebas Lopez is a 53 year old male who speaks English and lives with a spouse,  in a home  They arrived in the ED by car from home    ED Chief Complaint: Abdominal Pain    ED Dx;   Final diagnoses:   Colonic obstruction         Needed?: No    Allergies:   Allergies   Allergen Reactions     Liquid Adhesive Rash     Dermabond, rash with pustules, occurred with abdominal surgery and port removal    .  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5 mg (0.5 mg Intravenous Given 7/3/18 1237)   lactated ringers BOLUS 1,000 mL (1,000 mLs Intravenous New Bag 7/3/18 1236)   iopamidol (ISOVUE-370) solution 93 mL (93 mLs Intravenous Given 7/3/18 1303)       Drips running?  Yes -bolus fluids    Home pump  No    Current LDAs  PICC Double Lumen 02/23/18 Right Basilic (Active)   Site Assessment WDL 7/3/2018 12:44 PM   PICC Lumen Assessment Trigger Purple 7/3/2018 12:44 PM   Number of days:130       Ileostomy (Active)   Number of days:209      "  Incision/Surgical Site 08/10/17 Right Chest (Active)   Number of days:327       Incision/Surgical Site 12/06/17 Abdomen (Active)   Number of days:209       Labs results:   Labs Ordered and Resulted from Time of ED Arrival Up to the Time of Departure from the ED   CBC WITH PLATELETS DIFFERENTIAL - Abnormal; Notable for the following:        Result Value    WBC 28.5 (*)     RBC Count 3.61 (*)     Hemoglobin 10.1 (*)     Hematocrit 29.5 (*)     RDW 16.2 (*)     Platelet Count 548 (*)     Absolute Neutrophil 18.4 (*)     Absolute Monocytes 7.2 (*)     Absolute Metamyelocytes 0.5 (*)     Absolute Myelocytes 0.3 (*)     Absolute Promyeloctyes 0.7 (*)     All other components within normal limits   COMPREHENSIVE METABOLIC PANEL - Abnormal; Notable for the following:     Sodium 126 (*)     Potassium 3.1 (*)     Chloride 81 (*)     Glucose 127 (*)     Albumin 2.1 (*)     All other components within normal limits   LIPASE   CREATININE POCT   ROUTINE UA WITH MICROSCOPIC   LACTIC ACID WHOLE BLOOD   ISTAT CREATININE NURSING POCT   PERIPHERAL IV CATHETER       Imaging Studies:   Recent Results (from the past 24 hour(s))   CT Abdomen Pelvis w Contrast    Impression    IMPRESSION:  1. Increased fluid dilation of the colon upstream of a rectosigmoid  stent (traversing a colon mass) with draping of the colonic wall over  the proximal orifice of the stent, likely impairing passage of luminal  contents into the stent.  2. Persistent diffuse colonic wall thickening, likely representing  ongoing colitis.  3. No significant change in extensive peritoneal carcinomatosis, as  described.       Recent vital signs:   /71  Pulse 111  Temp 99.1  F (37.3  C) (Oral)  Resp 11  Ht 1.753 m (5' 9\")  SpO2 98%    Cardiac Rhythm: Normal Sinus  Pt needs tele? No  Skin/wound Issues: None    Code Status: Full Code    Pain control: good    Nausea control: good    Abnormal labs/tests/findings requiring intervention:     Family present during ED " course? Yes   Family Comments/Social Situation comments: NA    Tasks needing completion: None    Katy Shannon RN  [facility]-- [phone] [place] [facility]  [phone] [facility] ED

## 2018-07-03 NOTE — PHARMACY-ADMISSION MEDICATION HISTORY
Admission medication history interview status for the 7/3/2018 admission is complete. See Epic admission navigator for allergy information, pharmacy, prior to admission medications and immunization status.     Medication history interview sources: Patient and SureScripts    Changes made to PTA medication list (reason)  Added: Vitamin B12 and Vitamin B3 per patient  Deleted: amoxicillin (no longer using), doxycycline (finished course for presumed Lyme disease), Lomotil (MD told patient he shouldn't be using), ferrous sulfate (too many medications and nausea), potassium (no longer using), psyllium (GI issues - not needed), duplicates - metronidazole, opium tincture, and ondansetron  Changed: None    Additional medication history information (including reliability of information, actions taken by pharmacist):   1. Patient was a reliable historian. Knew medication names, doses, and frequencies.   2. Patient confirmed allergies and reactions. They have no additional allergies to report.    3. PRNs    Dronabinol: patient uses ~1x/wk - states it doesn't help    Naloxone nasal spray - has never had to use    Ondansetron - uses 1 tablet TID    Oxycodone - on average uses 3x/day, however, recently has used q4h d/t increasing pain   4. Antibiotics    Levofloxacin and metronidazole prescribed 6/28/18. Pt took a few doses but discontinued use d/t nausea.     Doxycycline - pt finished course ~2 wks ago for presumed Lyme disease after a tick found on his person.   5. Fentanyl patch: due to be changed tonight      Prior to Admission medications    Medication Sig Last Dose Taking? Auth Provider   amylase-lipase-protease (CREON) 01180 UNITS CPEP Take 2 capsules (72,000 Units) by mouth 3 times daily (with meals) And 1 capsule with each snack tid. Past Week Yes Linda Alves PA-C   cyanocobalamin (VITAMIN  B-12) 1000 MCG tablet Take 1,000 mcg by mouth daily 7/2/2018 Yes Unknown, Entered By History   dronabinol (MARINOL) 5 MG  capsule Take 1 capsule (5 mg) by mouth 2 times daily (before meals) Past Week Yes Linda Alves PA-C   fentaNYL (DURAGESIC) 25 mcg/hr 72 hr patch Place 1 patch onto the skin every 72 hours remove old patch. 6/30/2018 at PM Yes Deysi Lay PA-C   levofloxacin (LEVAQUIN) 500 MG tablet Take 1 tablet (500 mg) by mouth daily Past Week Yes Albert Long MD   LORazepam (ATIVAN) 0.5 MG tablet Take 1 tablet (0.5 mg) by mouth every 4 hours as needed (Anxiety, Nausea/Vomiting or Sleep) 7/2/2018 Yes Albert Long MD   metroNIDAZOLE (FLAGYL) 500 MG tablet Take 1 tablet (500 mg) by mouth 3 times daily for 7 days Past Week Yes Albert Long MD   Multiple Vitamins-Minerals (MULTIVITAMIN & MINERAL PO) Take 1 tablet by mouth daily 7/2/2018 Yes Unknown, Entered By History   NIACIN PO Take by mouth daily 7/2/2018 Yes Unknown, Entered By History   omeprazole (PRILOSEC) 20 MG CR capsule Take 1 capsule (20 mg) by mouth daily 7/3/2018 Yes Sukhdeep Mota MD   ondansetron (ZOFRAN-ODT) 8 MG ODT tab Take 1 tablet (8 mg) by mouth every 8 hours as needed for nausea 7/3/2018 Yes Linda Alves PA-C   opium tincture 10 MG/ML (1%) liquid Take 0.6 mLs (6 mg) by mouth 4 times daily Past Month Yes Linda Alves PA-C   oxyCODONE IR (ROXICODONE) 10 MG tablet Take 0.5-1 tablets (5-10 mg) by mouth every 4 hours as needed for moderate to severe pain 7/3/2018 Yes Deysi Lay PA-C   naloxone (NARCAN) nasal spray Spray 1 spray (4 mg) into one nostril alternating nostrils as needed for opioid reversal every 2-3 minutes until assistance arrives   Sukhdeep Mota MD   parenteral nutrition - PTA/DISCHARGE ORDER Inject into the vein daily See medication history note from 2/20/18 for most recent TPN formula.   Unknown, Entered By History         Medication history completed by: Mouna Sosa, PharmD

## 2018-07-03 NOTE — CONSULTS
Colon and Rectal Surgery Consultation         Sebas Lopez    MRN# [id number]   YOB: 1965 Age: 53 year old   Date of Admission: 7/3/2018  Date of Consult: 7/3/2018          Assessment and Plan:      53M with long history of UC, complicated by metastatic colon CA with known sigmoid stricture managed with colonic stents and proximal diversion w/ an ileostomy, currently on Palliative chemo, now presenting with worsening abdominal pain. CT with evidence of increased LBO proximal to site of sigmoid stents. Pt is currently HD stable. Tender on abdominal exam but without evidence of peritonitis. WBC 28.    Reviewed records and discussed case with staff. Given degree of disseminated disease and adhesions noted during last surgery, any surgical intervention to address this LBO will be associated with extremely high morbidity and mortality. He has had some relieve with stent placements by GI in the past. Also does not seem to be completely obstructed per his report.    -Unfortunately patient is not an operative candidate  -Recommend GI consult for consideration of repeat stenting  -If GI unable to do stenting, could contact IR to see if placement of a drain into the bowel to create a controlled fistula is feasible  -If no intervention possible, would recommend involving Palliative care    Will sign off at this time. Please do contact us if any questions    Discussed with Dr Dudley who agrees with above assessment and plan             Primary Care Physician:      Jaguar Becker [phone]         Requesting Physician:      Dr Haas         Chief Complaint:      LBO    History is obtained from the patient and review of chart         History of Present Illness:      Sebas Lpoez is a 53 year old male with history of UC (diagnosed 1990s with no medical management and poor endoscopic surveillance), who initially presented 5/2017 with LBO due to a sigmoid mass. Underwent Ex-lap, SBR and end  ileostomy creation on 7/2017 with Dr Bartholomew. Had diffuse peritoneal carcinomatosis per report for which he underwent 6 cycles of chemotherapy in preparation for a planned diagnostic lap and possible HIPEC on 12/6/17.  He was however found to have a high burden of disease with a PCI of 26. As such, no cytoreductive surgery or HIPEC was performed. He has since been on palliative chemotherapy.    He has had issues with ongoing abdominal pain due to his tumor burden and obstructive symptoms from the sigmoid mass. This has been stented twice by GI , first on 2/6/18 and then on 3/18/18. Was feeling better until past several days when he started having increased abdominal distension and discomfort. CT in ED today notable for increased distension of bowel proximal to stent in sigmoid colon.     At time of visit pt reports feeling a bit better. Has been having high output from his ileostomy for which he's been taking tincture of opium. Is intermittently passing stool and mucous per rectum. Was supposed to have chemo dose on 6/28/18 but deferred.               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     Ulcerative colitis (H)              Past Surgical History:        Past Surgical History:   Procedure Laterality Date     COLONOSCOPY  2017     COLONOSCOPY N/A 11/30/2017    Procedure: COLONOSCOPY;  Colonoscopy;  Surgeon: Rob Dudley MD;  Location: [facility]     COLONOSCOPY N/A 2/6/2018    Procedure: COMBINED COLONOSCOPY, STENT PLACEMENT;  COMBINED COLONOSCOPY with Colonic Stent Placement;  Surgeon: Guru Lionel Mar MD;  Location: [facility]     COLONOSCOPY N/A 3/19/2018    Procedure:  COMBINED COLONOSCOPY, STENT PLACEMENT;  flexible sigmoidoscopy with stent placement and dilation;  Surgeon: Alexis Rios MD;  Location: [facility] OR     GI SURGERY  07/10/2017    Extensive lysis of adhesions, small bowel resection with end ileostomy.      HERNIA REPAIR       INSERT PORT VASCULAR ACCESS Right 8/10/2017    Procedure: INSERT PORT VASCULAR ACCESS;  Single Lumen Right Chest Power Port;  Surgeon: Malena Andrew PA-C;  Location: [facility]     LAPAROSCOPY DIAGNOSTIC (GENERAL) N/A 12/6/2017    Procedure: LAPAROSCOPY DIAGNOSTIC (GENERAL);  Diagnostic Laparoscopy, Exploratory Laparotomy Anesthesia Block ;  Surgeon: Rob Dudley MD;  Location: [facility]     LAPAROTOMY EXPLORATORY N/A 12/6/2017    Procedure: LAPAROTOMY EXPLORATORY;;  Surgeon: Rob Dudley MD;  Location: [facility]     ORTHOPEDIC SURGERY Left     HIP ARTHROPLASTY     PICC INSERTION Right 02/23/2018    5Fr DL BioFlo PICC, 44cm (3cm external) in the R basilic vein w/ tip in the SVC RA junction                 Home Medications:        Prior to Admission medications    Medication Sig Last Dose Taking? Auth Provider   amoxicillin (AMOXIL) 500 MG capsule    Reported, Patient   amylase-lipase-protease (CREON) 62595 UNITS CPEP Take 2 capsules (72,000 Units) by mouth 3 times daily (with meals) And 1 capsule with each snack tid.   Linda Alves PA-C   diphenoxylate-atropine (LOMOTIL) 2.5-0.025 MG per tablet    Reported, Patient   doxycycline monohydrate 100 MG capsule    Reported, Patient   dronabinol (MARINOL) 5 MG capsule Take 1 capsule (5 mg) by mouth 2 times daily (before meals)  Patient not taking: Reported on 5/18/2018   Linda Alves PA-C   fentaNYL (DURAGESIC) 25 mcg/hr 72 hr patch Place 1 patch onto the skin every 72 hours remove old patch.   Deysi Lay PA-C   Ferrous Sulfate (IRON SUPPLEMENT PO) Take 325 mg by mouth 2 times daily (with meals)    Reported, Patient   levofloxacin (LEVAQUIN) 500 MG tablet Take 1  tablet (500 mg) by mouth daily   Albert Long MD   LORazepam (ATIVAN) 0.5 MG tablet Take 1 tablet (0.5 mg) by mouth every 4 hours as needed (Anxiety, Nausea/Vomiting or Sleep)   Albert Long MD   metroNIDAZOLE (FLAGYL) 500 MG tablet Take 1 tablet (500 mg) by mouth 3 times daily for 7 days   Albert Long MD   metroNIDAZOLE (FLAGYL) 500 MG tablet    Reported, Patient   Multiple Vitamins-Minerals (MULTIVITAMIN & MINERAL PO) Take 1 tablet by mouth daily   Unknown, Entered By History   naloxone (NARCAN) nasal spray Spray 1 spray (4 mg) into one nostril alternating nostrils as needed for opioid reversal every 2-3 minutes until assistance arrives  Patient not taking: Reported on 5/3/2018   Sukhdeep Mota MD   omeprazole (PRILOSEC) 20 MG CR capsule Take 1 capsule (20 mg) by mouth daily   Sukhdeep Mota MD   ondansetron (ZOFRAN) 8 MG tablet    Reported, Patient   ondansetron (ZOFRAN-ODT) 8 MG ODT tab Take 1 tablet (8 mg) by mouth every 8 hours as needed for nausea   Linda Alves PA-C   opium tincture 10 MG/ML (1%) liquid Take 0.6 mLs (6 mg) by mouth 4 times daily   Linda Alves PA-C   opium tincture 10 MG/ML (1%) liquid Take by mouth every 4 hours as needed for diarrhea or moderate pain   Reported, Patient   oxyCODONE IR (ROXICODONE) 10 MG tablet Take 0.5-1 tablets (5-10 mg) by mouth every 4 hours as needed for moderate to severe pain   Deysi Lay PA-C   parenteral nutrition - PTA/DISCHARGE ORDER Inject into the vein daily See medication history note from 2/20/18 for most recent TPN formula.   Unknown, Entered By History   Potassium Chloride 40 MEQ/15ML (20%) SOLN Take 7.5 mLs (20 mEq) by mouth daily   Linda Alves PA-C   Psyllium (METAMUCIL FIBER) 51.7 % PACK Take 1 packet by mouth 3 times daily   Joselyn, Linda Melida, PA-C            Current Medications:           lactated ringers  1,000 mL Intravenous Once             Allergies:     Allergies   Allergen Reactions     Liquid Adhesive  "Rash     Dermabond, rash with pustules, occurred with abdominal surgery and port removal             Social History:        Social History   Substance Use Topics     Smoking status: Never Smoker     Smokeless tobacco: Never Used     Alcohol use 3.0 oz/week     5 Cans of beer per week      Comment: none for last 4 months             Family History:        Family History   Problem Relation Age of Onset     Hypertension Father      Diabetes Father              Review of Systems:      The 10 point Review of Systems is negative other than noted in the HPI.            Physical Exam:      Blood pressure 101/71, pulse 111, temperature 99.1  F (37.3  C), temperature source Oral, resp. rate 11, height 1.753 m (5' 9\"), SpO2 98 %.  There were no vitals filed for this visit.  Vital Sign Ranges  Temperature Temp  Av.1  F (37.3  C)  Min: 99.1  F (37.3  C)  Max: 99.1  F (37.3  C)   Blood pressure Systolic (24hrs), Av , Min:101 , Max:111        Diastolic (24hrs), Av, Min:69, Max:75      Pulse Pulse  Av  Min: 111  Max: 111   Respirations Resp  Av.7  Min: 11  Max: 17   Pulse oximetry SpO2  Av.8 %  Min: 96 %  Max: 99 %       No intake or output data in the 24 hours ending 18 1503    Constitutional: Awake, alert, cooperative, no apparent distress   Psych: Alert, oriented to person, place and time, no obvious anxiety or depression   Neuro: normal strength and sensation.   Eyes: Conjunctiva and pupils examined and normal.   ENT: Moist mucous membranes   GI: soft, mildly distended, tender on palpation with no rebound. Stoma w/ stool/gas output   Skin: No cyanosis   Musculoskeletal: No erythema           Data:      All new lab and imaging data was reviewed.   Recent Labs   Lab Test  18   1227  18   1040  06/15/18   1011   18   1658   18   0632   18   1000   WBC  28.5*  12.4*  9.6   < >   --    < >  9.9   < >   --    HGB  10.1*  10.7*  10.5*   < >   --    < >  7.3*   < >   --  "   PLT  548*  383  317   < >   --    < >  439   < >   --    INR   --    --    --    --   1.12   --   1.27*   --   2.14*    < > = values in this interval not displayed.      Recent Labs   Lab Test  07/03/18   1227  06/27/18   1040  06/15/18   1011   NA  126*  131*  133   POTASSIUM  3.1*  3.7  3.3*   CHLORIDE  81*  95  92*   CO2  32  24  25   BUN  22  24  32*   CR  1.03  1.09  1.03   ANIONGAP  12  11  15*   ANNAMARIE  9.0  9.1  9.1   GLC  127*  99  94     Imaging      IMPRESSION:  1. Increased fluid dilation of the colon upstream of a rectosigmoid  stent (traversing a colon mass). The colonic wall is draped over the  proximal orifice of the stent, likely impairing passage of luminal  contents into the stent with resultant obstruction.  2. Persistent diffuse colonic wall thickening, likely representing  ongoing colitis.  3. No significant change in extensive peritoneal carcinomatosis, as  described.    Palak Padilla MD  [facility]

## 2018-07-03 NOTE — IP AVS SNAPSHOT
"    [facility]: [phone]                                              INTERAGENCY TRANSFER FORM - PHYSICIAN ORDERS   7/3/2018                    Hospital Admission Date: 7/3/2018  KARINA MINER   : 1965  Sex: Male        Attending Provider: Denise Erwin MD     Allergies:  Liquid Adhesive    Infection:  None   Service:  Hem/Onc    Ht:  1.753 m (5' 9\")   Wt:  74.7 kg (164 lb 9.6 oz)   Admission Wt:  69.4 kg (153 lb)    BMI:  24.31 kg/m 2   BSA:  1.91 m 2            Patient PCP Information     Provider PCP Type    NGUYỄN HERNÁNDEZ MD General      ED Clinical Impression     Diagnosis Description Comment Added By Time Added    Colonic obstruction [K56.609] Colonic obstruction [K56.609]  Saurav Haas MD 7/3/2018  2:24 PM    Hyponatremia [E87.1] Hyponatremia [E87.1]  Saurav Haas MD 7/3/2018  2:58 PM    Hypokalemia [E87.6] Hypokalemia [E87.6]  Saurav Haas MD 7/3/2018  2:58 PM      Hospital Problems as of 2018              Priority Class Noted POA    Colon adenocarcinoma (H) Medium  7/3/2018 Yes      Non-Hospital Problems as of 2018              Priority Class Noted    Peritoneal carcinomatosis (H) Medium  8/3/2017    Cancer of sigmoid colon (H) Medium  8/3/2017    Iron deficiency anemia due to chronic blood loss Medium  2017    Diarrhea, unspecified type Medium  2017    Hypokalemia Medium  2017    Large bowel obstruction Medium  2018    Colon cancer (H) Medium  2018    Colitis presumed infectious Medium  2018    Bacteremia Medium  2018      Code Status History     Date Active Date Inactive Code Status Order ID Comments User Context    2018 12:19 PM  Full Code [id number]  Margarita Dougherty PA-C Outpatient    7/3/2018  4:01 PM 2018 12:19 PM Full Code [id number]  Margarita Dougherty PA-C Inpatient    3/20/2018  1:29 PM 7/3/2018  4:01 PM Full Code [id number]  Ericka Lentz PA-C Outpatient    3/18/2018 10:24 AM 3/20/2018  1:29 " PM Full Code [id number]  Stephon Mason MD Inpatient    2/20/2018  4:47 PM 2/25/2018  3:48 PM Full Code [id number]  Ericka Lentz PA-C Inpatient    2/9/2018 11:18 AM 2/20/2018  4:47 PM Full Code [id number]  Es Dumont APRN CNP Outpatient    2/5/2018 11:19 AM 2/9/2018 11:18 AM Full Code [id number]  Es Dumont APRN CNP Inpatient    12/7/2017  2:12 PM 2/5/2018 11:19 AM Full Code [id number]  Freddie Tadeo MD Outpatient    12/6/2017 11:44 AM 12/7/2017  2:12 PM Full Code [id number]  Rob Dudley MD Inpatient         Medication Review      START taking        Dose / Directions Comments    ciprofloxacin 500 MG tablet   Commonly known as:  CIPRO   Indication:  fever, suspect abdominal source   Used for:  Colonic obstruction        Dose:  500 mg   Take 1 tablet (500 mg) by mouth every 12 hours for 11 doses   Quantity:  11 tablet   Refills:  0          CONTINUE these medications which may have CHANGED, or have new prescriptions. If we are uncertain of the size of tablets/capsules you have at home, strength may be listed as something that might have changed.        Dose / Directions Comments    * oxyCODONE IR 10 MG tablet   Commonly known as:  ROXICODONE   This may have changed:    - how much to take  - how to take this  - when to take this  - reasons to take this  - additional instructions   Used for:  Cancer of sigmoid colon (H), Peritoneal carcinomatosis (H)        Take 15-25 mg every 4 hours as needed for moderate to severe pain (can pair 10 mg tabs with 5 mg tabs to make 15 or 25 mg dose).   Quantity:  90 tablet   Refills:  0        * oxyCODONE IR 5 MG tablet   Commonly known as:  ROXICODONE   This may have changed:  You were already taking a medication with the same name, and this prescription was added. Make sure you understand how and when to take each.   Used for:  Cancer of sigmoid colon (H)        Take 15-25 mg q4 hrs as needed for moderate to severe pain (can take with  10 mg tabs of previous prescription to make 15 mg or 25 mg doses).   Quantity:  20 tablet   Refills:  0        * Notice:  This list has 2 medication(s) that are the same as other medications prescribed for you. Read the directions carefully, and ask your doctor or other care provider to review them with you.      CONTINUE these medications which have NOT CHANGED        Dose / Directions Comments    amylase-lipase-protease 91710 units Cpep   Commonly known as:  CREON   Used for:  Diarrhea, unspecified type, Peritoneal carcinomatosis (H), Cancer of sigmoid colon (H)        Dose:  2 capsule   Take 2 capsules (72,000 Units) by mouth 3 times daily (with meals) And 1 capsule with each snack tid.   Quantity:  240 capsule   Refills:  3    Mail to patient if not yet due to be filled.       cyanocobalamin 1000 MCG tablet   Commonly known as:  vitamin  B-12        Dose:  1000 mcg   Take 1,000 mcg by mouth daily   Refills:  0        dronabinol 5 MG capsule   Commonly known as:  MARINOL   Used for:  Peritoneal carcinomatosis (H), Anorexia        Dose:  5 mg   Take 1 capsule (5 mg) by mouth 2 times daily (before meals)   Quantity:  60 capsule   Refills:  0        fentaNYL 25 mcg/hr 72 hr patch   Commonly known as:  DURAGESIC   Used for:  Cancer of sigmoid colon (H)        Dose:  1 patch   Place 1 patch onto the skin every 72 hours remove old patch.   Quantity:  1 patch   Refills:  0        FLAGYL 500 MG tablet   Used for:  Colitis   Generic drug:  metroNIDAZOLE        Dose:  500 mg   Take 1 tablet (500 mg) by mouth 3 times daily   Quantity:  16 tablet   Refills:  0        LORazepam 0.5 MG tablet   Commonly known as:  ATIVAN   Used for:  Peritoneal carcinomatosis (H), Cancer of sigmoid colon (H)        Dose:  0.5 mg   Take 1 tablet (0.5 mg) by mouth every 4 hours as needed (Anxiety, Nausea/Vomiting or Sleep)   Quantity:  30 tablet   Refills:  5        MULTIVITAMIN & MINERAL PO        Dose:  1 tablet   Take 1 tablet by mouth daily    Refills:  0        naloxone nasal spray   Commonly known as:  NARCAN   Used for:  Cancer of sigmoid colon (H), Long term current use of opiate analgesic        Dose:  4 mg   Spray 1 spray (4 mg) into one nostril alternating nostrils as needed for opioid reversal every 2-3 minutes until assistance arrives   Quantity:  0.2 mL   Refills:  0        NIACIN PO        Take by mouth daily   Refills:  0        omeprazole 20 MG CR capsule   Commonly known as:  priLOSEC   Used for:  Diarrhea, unspecified type        Dose:  20 mg   Take 1 capsule (20 mg) by mouth daily   Quantity:  30 capsule   Refills:  11        ondansetron 8 MG ODT tab   Commonly known as:  ZOFRAN-ODT   Used for:  Nausea        Dose:  8 mg   Take 1 tablet (8 mg) by mouth every 8 hours as needed for nausea   Quantity:  60 tablet   Refills:  5        opium tincture 10 MG/ML (1%) liquid   Used for:  Cancer of sigmoid colon (H), Diarrhea, unspecified type        Dose:  0.6 mL   Take 0.6 mLs (6 mg) by mouth 4 times daily   Quantity:  72 mL   Refills:  0        parenteral nutrition - PTA/DISCHARGE ORDER        Inject into the vein daily [facility]   Refills:  0        sodium chloride 0.9% Soln BOLUS        Dose:  1000 mL   Inject 1,000 mLs into the vein daily as needed For hydration.   Quantity:  1000 mL   Refills:  0          STOP taking     levofloxacin 500 MG tablet   Commonly known as:  LEVAQUIN                     Further instructions from your care team       __________________________________________________________  IF PATIENT DISCHARGES OVER WEEKEND:  Please call this home care agency below to notify of discharge and fax [facility]  Weekend Phone  [phone]  Fax  [phone]       ____________________________________________________________        Summary of Visit     Reason for your hospital stay       Admitted with abdominal pain and distension and taken for a flex sigmoidoscopy and stent placement.             After Care     Activity        Your activity upon discharge: activity as tolerated       Diet       Follow this diet upon discharge: Orders Placed This Encounter      Advance Diet as Tolerated: Regular Diet Adult       Discharge Instructions       --New oxycodone prescription: Take 15-25 mg every 4 hours as needed. You have 10 mg tabs at home, and I prescribed you some 5 mg tablets. You would take something like this: 15 mg dose (one 10 mg tab+ one 5 mg tab) or 25 mg dose (two 10 mg tabs+one 5 mg tab).   -Please complete entire course of antibiotics (Flagyl and Ciprofloxacin) as prescribed. It will be about 6 more days.             Referrals     Home infusion referral       Your provider has referred you to: [facility]: [facility] [place] [phone]   [email or web address]    Resumption of home TPN management and supplies  Local Address (if different from home address): N/A    Anticipated Length of Therapy: per physician orders    Home Infusion Pharmacist to adjust therapy based on labs and clinical assessments: Yes    Labs:  May draw labs from Venous Catheter: Yes  Home Infusion Pharmacist to order labs based on therapy type and clinical assessments: Yes  Call/Fax Lab Results to: as previously in place    Agency Staff to assess nursing needs for Infusion Therapy.    Access Device Management:  IV Access Type: Port-a-Cath  Flush with Heparin and Normal Saline IVP PRN and routine site care (per agency protocol) to maintain access device? Yes             Lab Orders     CBC with platelets differential       Last Lab Result: Hemoglobin (g/dL)       Date                     Value                 07/08/2018               8.3 (L)          ----------       Comprehensive metabolic panel                 Your next 10 appointments already scheduled     Jul 10, 2018  1:45 PM T   [facility]  [facility]   [facility] ([facility])    [address]  [address]  [place] [address]   [phone]            Jul 10, 2018  2:30 PM CDT   (Arrive by 2:15 PM)   Return Visit with Chiquis Negro PA-C   [facility] ([facility])    [address]  [address]  [place] [address]   [phone]              Follow-Up Appointment Instructions     Future Labs/Procedures    Follow Up and recommended labs and tests     Comments:    Follow up as scheduled below:   -Requested apt with Chiquis Negro for 7/10. Cancelled 7/9 apt.      Follow-Up Appointment Instructions     Follow Up and recommended labs and tests       Follow up as scheduled below:   -Requested apt with Chiquis Negro for 7/10. Cancelled 7/9 apt.             Statement of Approval     Ordered          07/08/18 1239  I have reviewed and agree with all the recommendations and orders detailed in this document.  EFFECTIVE NOW     Approved and electronically signed by:  Margarita Dougherty PA-C

## 2018-07-03 NOTE — IP AVS SNAPSHOT
MRN:[id number]                      After Visit Summary   7/3/2018    Sebas Lopez    MRN: [id number]           Thank you!     Thank you for choosing [facility] for your care. Our goal is always to provide you with excellent care. Hearing back from our patients is one way we can continue to improve our services. Please take a few minutes to complete the written survey that you may receive in the mail after you visit with us. Thank you!        Patient Information     Date Of Birth          1965        Designated Caregiver       Most Recent Value    Caregiver    Will someone help with your care after discharge? yes    Name of designated caregiver Bessie    Phone number of caregiver [phone]    Caregiver address [address] [place] [address]       About your hospital stay     You were admitted on:  July 3, 2018 You last received care in the:  [address] [facility]    You were discharged on:  July 8, 2018        Reason for your hospital stay       Admitted with abdominal pain and distension and taken for a flex sigmoidoscopy and stent placement.                  Who to Call     For medical emergencies, please call 911.  For non-urgent questions about your medical care, please call your primary care provider or clinic, [phone]  For questions related to your surgery, please call your surgery clinic        Attending Provider     Provider Specialty    Saurav Haas MD Emergency Medicine    Irena Smith MD Internal Medicine    Denise Erwin MD Hematology & Oncology       Primary Care Provider Office Phone # Fax #    Jaguar Becker [phone]       When to contact your care team       [facility] triage line at [phone] for temp >100.4, uncontrolled nausea/vomiting/diarrhea/constipation, unrelieved pain, bleeding not relieved with pressure, dizziness, chest pain, shortness of breath, loss of consciousness, and any new or concerning  symptoms.                  After Care Instructions     Activity       Your activity upon discharge: activity as tolerated            Diet       Follow this diet upon discharge: Orders Placed This Encounter      Advance Diet as Tolerated: Regular Diet Adult            Discharge Instructions       --New oxycodone prescription: Take 15-25 mg every 4 hours as needed. You have 10 mg tabs at home, and I prescribed you some 5 mg tablets. You would take something like this: 15 mg dose (one 10 mg tab+ one 5 mg tab) or 25 mg dose (two 10 mg tabs+one 5 mg tab).   -Please complete entire course of antibiotics (Flagyl and Ciprofloxacin) as prescribed. It will be about 6 more days.                  Follow-up Appointments     Follow Up and recommended labs and tests       Follow up as scheduled below:   -Requested apt with Chiquis Negro for 7/10. Cancelled 7/9 apt.                  Your next 10 appointments already scheduled     Jul 10, 2018  1:45 PM CDT   [facility] with  [facility]   [facility] ([facility])    [address] [place] [address]  [address]  [facility] [id number] [phone]            Jul 10, 2018  2:30 PM CDT   (Arrive by 2:15 PM)   Return Visit with Chiquis Negro PA-C   [facility] ([facility])    [address] [place] [address]  [address]  [facility] [address] [phone]              Additional Services     Home infusion referral       Your provider has referred you to: [facility]: [facility] [place] [phone]   [email or web address]    Resumption of home TPN management and supplies  Local Address (if different from home address): N/A    Anticipated Length of Therapy: per physician orders    Home Infusion Pharmacist to adjust therapy based on labs and clinical assessments: Yes    Labs:  May draw labs from Venous Catheter: Yes  Home Infusion Pharmacist to order  "labs based on therapy type and clinical assessments: Yes  Call/Fax Lab Results to: as previously in place    Agency Staff to assess nursing needs for Infusion Therapy.    Access Device Management:  IV Access Type: Port-a-Cath  Flush with Heparin and Normal Saline IVP PRN and routine site care (per agency protocol) to maintain access device? Yes                  Future tests that were ordered for you     CBC with platelets differential       Last Lab Result: Hemoglobin (g/dL)       Date                     Value                 07/08/2018               8.3 (L)          ----------            Comprehensive metabolic panel                 Further instructions from your care team       __________________________________________________________  IF PATIENT DISCHARGES OVER WEEKEND:  Please call this home care agency below to notify of discharge and fax AVS  [facility]  Weekend Phone  [phone]  Fax  [phone]       ____________________________________________________________        Pending Results     Date and Time Order Name Status Description    7/4/2018 2207 Blood culture Preliminary     7/4/2018 2207 Blood culture Preliminary     7/3/2018 1620 Blood culture Preliminary     7/3/2018 1620 Blood culture Preliminary             Statement of Approval     Ordered          07/08/18 1239  I have reviewed and agree with all the recommendations and orders detailed in this document.  EFFECTIVE NOW     Approved and electronically signed by:  Margarita Dougherty PA-C             Admission Information     Date & Time Provider Department Dept. Phone    7/3/2018 Denise Erwin MD [address] [facility] [phone]      Your Vitals Were     Blood Pressure Pulse Temperature Respirations Height Weight    118/82 107 98.7  F (37.1  C) (Oral) 18 1.753 m (5' 9\") 74.7 kg (164 lb 9.6 oz)    Pulse Oximetry BMI (Body Mass Index)                98% 24.31 kg/m2          MyChart Information     [facility] gives you secure access " to your electronic health record. If you see a primary care provider, you can also send messages to your care team and make appointments. If you have questions, please call your primary care clinic.  If you do not have a primary care provider, please call [phone] and they will assist you.        Care EveryWhere ID     This is your Care EveryWhere ID. This could be used by other organizations to access your [facility] medical records  [id number]        Equal Access to Services     SARAH DUNN : Haddarrian spiveyo Soomaali, waaxda luqadaha, qaybta kaalmada adeegyada, bhargav willis. So [facility] [phone].    ATENCIÓN: Si habla espashok, tiene a cid disposición servicios gratuitos de asistencia lingüística. [facility] [phone].    We comply with applicable federal civil rights laws and [place] laws. We do not discriminate on the basis of race, color, national origin, age, disability, sex, sexual orientation, or gender identity.               Review of your medicines      START taking        Dose / Directions    ciprofloxacin 500 MG tablet   Commonly known as:  CIPRO   Indication:  fever, suspect abdominal source   Used for:  Colonic obstruction        Dose:  500 mg   Take 1 tablet (500 mg) by mouth every 12 hours for 11 doses   Quantity:  11 tablet   Refills:  0         CONTINUE these medicines which may have CHANGED, or have new prescriptions. If we are uncertain of the size of tablets/capsules you have at home, strength may be listed as something that might have changed.        Dose / Directions    * oxyCODONE IR 10 MG tablet   Commonly known as:  ROXICODONE   This may have changed:    - how much to take  - how to take this  - when to take this  - reasons to take this  - additional instructions   Used for:  Cancer of sigmoid colon (H), Peritoneal carcinomatosis (H)        Take 15-25 mg every 4 hours as needed for moderate to severe pain (can pair 10 mg tabs with 5 mg tabs to make  15 or 25 mg dose).   Quantity:  90 tablet   Refills:  0       * oxyCODONE IR 5 MG tablet   Commonly known as:  ROXICODONE   This may have changed:  You were already taking a medication with the same name, and this prescription was added. Make sure you understand how and when to take each.   Used for:  Cancer of sigmoid colon (H)        Take 15-25 mg q4 hrs as needed for moderate to severe pain (can take with 10 mg tabs of previous prescription to make 15 mg or 25 mg doses).   Quantity:  20 tablet   Refills:  0       * Notice:  This list has 2 medication(s) that are the same as other medications prescribed for you. Read the directions carefully, and ask your doctor or other care provider to review them with you.      CONTINUE these medicines which have NOT CHANGED        Dose / Directions    amylase-lipase-protease 19412 units Cpep   Commonly known as:  CREON   Used for:  Diarrhea, unspecified type, Peritoneal carcinomatosis (H), Cancer of sigmoid colon (H)        Dose:  2 capsule   Take 2 capsules (72,000 Units) by mouth 3 times daily (with meals) And 1 capsule with each snack tid.   Quantity:  240 capsule   Refills:  3       cyanocobalamin 1000 MCG tablet   Commonly known as:  vitamin  B-12        Dose:  1000 mcg   Take 1,000 mcg by mouth daily   Refills:  0       dronabinol 5 MG capsule   Commonly known as:  MARINOL   Used for:  Peritoneal carcinomatosis (H), Anorexia        Dose:  5 mg   Take 1 capsule (5 mg) by mouth 2 times daily (before meals)   Quantity:  60 capsule   Refills:  0       fentaNYL 25 mcg/hr 72 hr patch   Commonly known as:  DURAGESIC   Used for:  Cancer of sigmoid colon (H)        Dose:  1 patch   Place 1 patch onto the skin every 72 hours remove old patch.   Quantity:  1 patch   Refills:  0       FLAGYL 500 MG tablet   Used for:  Colitis   Generic drug:  metroNIDAZOLE        Dose:  500 mg   Take 1 tablet (500 mg) by mouth 3 times daily   Quantity:  16 tablet   Refills:  0       LORazepam 0.5 MG  tablet   Commonly known as:  ATIVAN   Used for:  Peritoneal carcinomatosis (H), Cancer of sigmoid colon (H)        Dose:  0.5 mg   Take 1 tablet (0.5 mg) by mouth every 4 hours as needed (Anxiety, Nausea/Vomiting or Sleep)   Quantity:  30 tablet   Refills:  5       MULTIVITAMIN & MINERAL PO        Dose:  1 tablet   Take 1 tablet by mouth daily   Refills:  0       naloxone nasal spray   Commonly known as:  NARCAN   Used for:  Cancer of sigmoid colon (H), Long term current use of opiate analgesic        Dose:  4 mg   Spray 1 spray (4 mg) into one nostril alternating nostrils as needed for opioid reversal every 2-3 minutes until assistance arrives   Quantity:  0.2 mL   Refills:  0       NIACIN PO        Take by mouth daily   Refills:  0       omeprazole 20 MG CR capsule   Commonly known as:  priLOSEC   Used for:  Diarrhea, unspecified type        Dose:  20 mg   Take 1 capsule (20 mg) by mouth daily   Quantity:  30 capsule   Refills:  11       ondansetron 8 MG ODT tab   Commonly known as:  ZOFRAN-ODT   Used for:  Nausea        Dose:  8 mg   Take 1 tablet (8 mg) by mouth every 8 hours as needed for nausea   Quantity:  60 tablet   Refills:  5       opium tincture 10 MG/ML (1%) liquid   Used for:  Cancer of sigmoid colon (H), Diarrhea, unspecified type        Dose:  0.6 mL   Take 0.6 mLs (6 mg) by mouth 4 times daily   Quantity:  72 mL   Refills:  0       parenteral nutrition - PTA/DISCHARGE ORDER        Inject into the vein daily [facility]   Refills:  0       sodium chloride 0.9% Soln BOLUS        Dose:  1000 mL   Inject 1,000 mLs into the vein daily as needed For hydration.   Quantity:  1000 mL   Refills:  0         STOP taking     levofloxacin 500 MG tablet   Commonly known as:  LEVAQUIN                Where to get your medicines      These medications were sent to [facility] - [place], [place] - [address]  [address], [facility] [address]     Phone:  [phone]     ciprofloxacin 500 MG  tablet         Some of these will need a paper prescription and others can be bought over the counter. Ask your nurse if you have questions.     Bring a paper prescription for each of these medications     oxyCODONE IR 5 MG tablet                Protect others around you: Learn how to safely use, store and throw away your medicines at www.disposemymeds.org.        ANTIBIOTIC INSTRUCTION     You've Been Prescribed an Antibiotic - Now What?  Your healthcare team thinks that you or your loved one might have an infection. Some infections can be treated with antibiotics, which are powerful, life-saving drugs. Like all medications, antibiotics have side effects and should only be used when necessary. There are some important things you should know about your antibiotic treatment.      Your healthcare team may run tests before you start taking an antibiotic.    Your team may take samples (e.g., from your blood, urine or other areas) to run tests to look for bacteria. These test can be important to determine if you need an antibiotic at all and, if you do, which antibiotic will work best.      Within a few days, your healthcare team might change or even stop your antibiotic.    Your team may start you on an antibiotic while they are working to find out what is making you sick.    Your team might change your antibiotic because test results show that a different antibiotic would be better to treat your infection.    In some cases, once your team has more information, they learn that you do not need an antibiotic at all. They may find out that you don't have an infection, or that the antibiotic you're taking won't work against your infection. For example, an infection caused by a virus can't be treated with antibiotics. Staying on an antibiotic when you don't need it is more likely to be harmful than helpful.      You may experience side effects from your antibiotic.    Like all medications, antibiotics have side effects. Some  of these can be serious.    Let you healthcare team know if you have any known allergies when you are admitted to the hospital.    One significant side effect of nearly all antibiotics is the risk of severe and sometimes deadly diarrhea caused by Clostridium difficile (C. Difficile). This occurs when a person takes antibiotics because some good germs are destroyed. Antibiotic use allows C. diificile to take over, putting patients at high risk for this serious infection.    As a patient or caregiver, it is important to understand your or your loved one's antibiotic treatment. It is especially important for caregivers to speak up when patients can't speak for themselves. Here are some important questions to ask your healthcare team.    What infection is this antibiotic treating and how do you know I have that infection?    What side effects might occur from this antibiotic?    How long will I need to take this antibiotic?    Is it safe to take this antibiotic with other medications or supplements (e.g., vitamins) that I am taking?     Are there any special directions I need to know about taking this antibiotic? For example, should I take it with food?    How will I be monitored to know whether my infection is responding to the antibiotic?    What tests may help to make sure the right antibiotic is prescribed for me?      Information provided by:  www.cdc.gov/getsmart  U.S. Department of Health and Human Services  Centers for disease Control and Prevention  National Center for Emerging and Zoonotic Infectious Diseases  Division of Healthcare Quality Promotion        Information about OPIOIDS     PRESCRIPTION OPIOIDS: WHAT YOU NEED TO KNOW   We gave you an opioid (narcotic) pain medicine. It is important to manage your pain, but opioids are not always the best choice. You should first try all the other options your care team gave you. Take this medicine for as short a time (and as few doses) as possible.     These  medicines have risks:    DO NOT drive when on new or higher doses of pain medicine. These medicines can affect your alertness and reaction times, and you could be arrested for driving under the influence (DUI). If you need to use opioids long-term, talk to your care team about driving.    DO NOT operate heave machinery    DO NOT do any other dangerous activities while taking these medicines.     DO NOT drink any alcohol while taking these medicines.      If the opioid prescribed includes acetaminophen, DO NOT take with any other medicines that contain acetaminophen. Read all labels carefully. Look for the word  acetaminophen  or  Tylenol.  Ask your pharmacist if you have questions or are unsure.    You can get addicted to pain medicines, especially if you have a history of addiction (chemical, alcohol or substance dependence). Talk to your care team about ways to reduce this risk.    Store your pills in a secure place, locked if possible. We will not replace any lost or stolen medicine. If you don t finish your medicine, please throw away (dispose) as directed by your pharmacist. The [place] Pollution Control Agency has more information about safe disposal: https://www.pca.[facility].[place].us/living-green/managing-unwanted-medications.     All opioids tend to cause constipation. Drink plenty of water and eat foods that have a lot of fiber, such as fruits, vegetables, prune juice, apple juice and high-fiber cereal. Take a laxative (Miralax, milk of magnesia, Colace, Senna) if you don t move your bowels at least every other day.              Medication List: This is a list of all your medications and when to take them. Check marks below indicate your daily home schedule. Keep this list as a reference.      Medications           Morning Afternoon Evening Bedtime As Needed    amylase-lipase-protease 64925 units Cpep   Commonly known as:  CREON   Take 2 capsules (72,000 Units) by mouth 3 times daily (with meals) And 1 capsule  with each snack tid.                                ciprofloxacin 500 MG tablet   Commonly known as:  CIPRO   Take 1 tablet (500 mg) by mouth every 12 hours for 11 doses   Last time this was given:  500 mg on 7/8/2018  8:27 AM                                cyanocobalamin 1000 MCG tablet   Commonly known as:  vitamin  B-12   Take 1,000 mcg by mouth daily   Last time this was given:  1,000 mcg on 7/8/2018  8:27 AM                                dronabinol 5 MG capsule   Commonly known as:  MARINOL   Take 1 capsule (5 mg) by mouth 2 times daily (before meals)   Last time this was given:  5 mg on 7/8/2018  8:29 AM                                fentaNYL 25 mcg/hr 72 hr patch   Commonly known as:  DURAGESIC   Place 1 patch onto the skin every 72 hours remove old patch.   Last time this was given:  1 patch on 7/6/2018  5:36 PM                                FLAGYL 500 MG tablet   Take 1 tablet (500 mg) by mouth 3 times daily   Last time this was given:  500 mg on 7/8/2018  6:38 AM   Generic drug:  metroNIDAZOLE                                LORazepam 0.5 MG tablet   Commonly known as:  ATIVAN   Take 1 tablet (0.5 mg) by mouth every 4 hours as needed (Anxiety, Nausea/Vomiting or Sleep)   Last time this was given:  0.5 mg on 7/4/2018  2:44 AM                                MULTIVITAMIN & MINERAL PO   Take 1 tablet by mouth daily                                naloxone nasal spray   Commonly known as:  NARCAN   Spray 1 spray (4 mg) into one nostril alternating nostrils as needed for opioid reversal every 2-3 minutes until assistance arrives                                NIACIN PO   Take by mouth daily                                omeprazole 20 MG CR capsule   Commonly known as:  priLOSEC   Take 1 capsule (20 mg) by mouth daily   Last time this was given:  20 mg on 7/8/2018  8:27 AM                                ondansetron 8 MG ODT tab   Commonly known as:  ZOFRAN-ODT   Take 1 tablet (8 mg) by mouth every 8 hours as  needed for nausea   Last time this was given:  8 mg on 7/7/2018  4:45 PM                                opium tincture 10 MG/ML (1%) liquid   Take 0.6 mLs (6 mg) by mouth 4 times daily                                * oxyCODONE IR 10 MG tablet   Commonly known as:  ROXICODONE   Take 15-25 mg every 4 hours as needed for moderate to severe pain (can pair 10 mg tabs with 5 mg tabs to make 15 or 25 mg dose).   Last time this was given:  20 mg on 7/8/2018 11:08 AM                                * oxyCODONE IR 5 MG tablet   Commonly known as:  ROXICODONE   Take 15-25 mg q4 hrs as needed for moderate to severe pain (can take with 10 mg tabs of previous prescription to make 15 mg or 25 mg doses).   Last time this was given:  20 mg on 7/8/2018 11:08 AM                                parenteral nutrition - PTA/DISCHARGE ORDER   Inject into the vein daily [facility]                                sodium chloride 0.9% Soln BOLUS   Inject 1,000 mLs into the vein daily as needed For hydration.   Last time this was given:  20 mLs on 7/8/2018  6:25 AM                                * Notice:  This list has 2 medication(s) that are the same as other medications prescribed for you. Read the directions carefully, and ask your doctor or other care provider to review them with you.

## 2018-07-03 NOTE — IP AVS SNAPSHOT
[address] [address]    Phone:  [phone]                                       After Visit Summary   7/3/2018    Sebas Lopez    MRN: [id number]           After Visit Summary Signature Page     I have received my discharge instructions, and my questions have been answered. I have discussed any challenges I see with this plan with the nurse or doctor.    ..........................................................................................................................................  Patient/Patient Representative Signature      ..........................................................................................................................................  Patient Representative Print Name and Relationship to Patient    ..................................................               ................................................  Date                                            Time    ..........................................................................................................................................  Reviewed by Signature/Title    ...................................................              ..............................................  Date                                                            Time

## 2018-07-03 NOTE — PROGRESS NOTES
This is a recent snapshot of the patient's [facility] medical record.  For current drug dose and complete information and questions, call [phone] or In Basket pool, [facility] home infusion ([id number])  CSN Number:  [id number]

## 2018-07-03 NOTE — PROGRESS NOTES
This is a recent snapshot of the patient's [facility] medical record.  For current drug dose and complete information and questions, call [phone] or In Basket pool, [facility] home infusion ([id number])  CSN Number:  [id number]

## 2018-07-03 NOTE — H&P
[facility], [place]    History and Physical  Hematology / Oncology     Date of Admission:  7/3/2018  Date of Service (when I saw the patient): 07/03/18    Assessment & Plan   Sebas Lopez is a 53 year old male with PMHx significant for ulcerative colitis, s/p ileostomy 7/2017, and sigmoid adenocarcinoma with peritoneal carcinomatosis s/p colonic stent 3/19 and recent chemotherapy with FOLFOX Cycle #13 6/15/18 who presents with increased abdominal pain and abdominal bloating.     #Increased abdominal pain.   #Increased abdominal bloating.   Patient notes approximately 3 weeks of above symptoms. States this started right after last cycle of chemotherapy given around 6/15/18. Usually resolves after a few days, although this time it did not. Pain has worsened over the course of these weeks, almost unmanageable on current PO regimen. Unable to take much in by mouth due to pain, nausea, and decreased appetite. Denies emesis. Denies change in amount, frequency or consistency of ileostomy output. Endorses maroon colored stool when passes a BM per rectum which is not new.   -CT AP in ED with increased fluid dilation of colon upstream of rectosigmoid stent, colonic wall draped over proximal orifice of stent leading to obstruction, persistent diffuse colonic wall thickening 2/2 ongoing colitis, no significant change in peritoneal carcinomatosis since last imaging.   -GI consulted, appreciate recs.   -CRS consulted, appreciate recs. No surgical intervention offered. Recommend IR consult if GI unable to replace/revise stent.  -Continue PTA oxycodone 5-10 mg q4hrs prn, IV dilaudid 0.5 mg q3hrs prn for breakthrough.   -NPO w/ obstruction.   -C diff from rectal output ordered.    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    Follows with Dr. Long outpatient. S/p ex lap 7/2017 with small bowel resection and end ileostomy. S/p 6 cycles of FOLFOX. Admitted on 12/6/2017 for  ex-lap with aborted HIPEC due to diffuse carcinomatosis. Repeat CT scan showed worsening peritoneal carcinomatosis. He was admitted to the hospital with worsening colon distention and colon stent was placed which improved his symptoms. He resumed FOLFOX C7 3/8/18 f/b hospitalization for bowel obstruction and stent replacement 3/19/2018. Continued with FOLFOX, last cycle #13 6/15/18. Repeat CT 6/27 with slightly progressed peritoneal carcinomatosis, increased colonic and rectal fluid favoring colitis. Per Dr. Long's note 6/28 next treatment options would likely be irinotecan and panitumumab within the next 1-2 weeks.   -Will need follow up at discharge.      #Anemia. Hgb 10.1 on admission. Likely 2/2 chronic disease and chemotherapy.   -Transfuse for hgb <7.    #Leukocytosis. Suspicious for infection in setting of increased abdominal pain, obstructive symptoms and cancerous involvement of bowels. Prescribed PO antibiotics by Dr. Long 6/28 however patient states he only took a few doses then independently discontinued.   - Blood cultures x2.   -Start on Zosyn 4.5 mg q6 hrs for suspected abdominal source of infection.     #Hyponatremia, hypokalemia.  Likely 2/2 poor PO intake/hydration. Na 126 and K 3.1.  -IVF resuscitation to slowly correct sodium.  -replace lytes per protocol prn.      #Malnutrition.  -Continue PTA TPN support.     # Pain Assessment:  Current Pain Score 3/20/2018   Patient currently in pain? yes   Pain score (0-10) -   Pain location Abdomen   Pain descriptors -   - Sebsa is experiencing pain due to sigmoid adenocarcinoma with peritoneal carcinomatosis. Pain management was discussed and the plan was created in a collaborative fashion.  Sebas's response to the current recommendations: engaged  - Please see the plan for pain management as documented above    FEN  -IVF @75 ml/hr  -replace lytes per protocol prn  -NPO    Prophys  -VTE: SCDs, mechanical for now with pending procedure and h/o maroon  stools.  -PUD: PTA omeprazole.  -Bowels: hold d/t obstructive symptoms.    Dispo: Pending improvement in acute symptoms, likely 2-3 day stay.     Patient and plan discussed with Dr. Crane.    Margarita Dougherty PA-C  Hematology/Oncology  Pager #[phone]    Code Status   Full Code    Primary Care Physician   NGUYỄN EHRNÁNDEZ    Chief Complaint   Patient presents with increased abdominal pain and bloating.    History is obtained from the patient and electronic health record    History of Present Illness   Sebas Lopez is a 53 year old male with PMHx significant for ulcerative colitis, s/p ileostomy 7/2017, and sigmoid adenocarcinoma with peritoneal carcinomatosis s/p colonic stent 3/19 and recent chemotherapy with FOLFOX Cycle #13 6/15/18 who presents with increased abdominal pain and abdominal bloating.       Past Medical History    I have reviewed this patient's medical history and updated it with pertinent information if needed.   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  Past Surgical History:   Procedure Laterality Date     COLONOSCOPY  2017     COLONOSCOPY N/A 11/30/2017    Procedure: COLONOSCOPY;  Colonoscopy;  Surgeon: Rob Dudley MD;  Location: [facility]     COLONOSCOPY N/A 2/6/2018    Procedure: COMBINED COLONOSCOPY, STENT PLACEMENT;  COMBINED COLONOSCOPY with Colonic Stent Placement;  Surgeon: Guru Lionel Mar MD;  Location: [facility]     COLONOSCOPY N/A 3/19/2018    Procedure: COMBINED COLONOSCOPY, STENT PLACEMENT;  flexible sigmoidoscopy  with stent placement and dilation;  Surgeon: Alexis Rios MD;  Location: [facility]     GI SURGERY  07/10/2017    Extensive lysis of adhesions, small bowel resection with end ileostomy.      HERNIA REPAIR       INSERT PORT VASCULAR ACCESS Right 8/10/2017    Procedure: INSERT PORT VASCULAR ACCESS;  Single Lumen Right Chest Power Port;  Surgeon: Malena Andrew PA-C;  Location: [facility]     LAPAROSCOPY DIAGNOSTIC (GENERAL) N/A 12/6/2017    Procedure: LAPAROSCOPY DIAGNOSTIC (GENERAL);  Diagnostic Laparoscopy, Exploratory Laparotomy Anesthesia Block ;  Surgeon: Rob Dudley MD;  Location: [facility]     LAPAROTOMY EXPLORATORY N/A 12/6/2017    Procedure: LAPAROTOMY EXPLORATORY;;  Surgeon: Rob Dudley MD;  Location: [facility]     ORTHOPEDIC SURGERY Left     HIP ARTHROPLASTY     PICC INSERTION Right 02/23/2018    5Fr DL BioFlo PICC, 44cm (3cm external) in the R basilic vein w/ tip in the SVC RA junction       Prior to Admission Medications   Prior to Admission Medications   Prescriptions Last Dose Informant Patient Reported? Taking?   LORazepam (ATIVAN) 0.5 MG tablet 7/2/2018  No Yes   Sig: Take 1 tablet (0.5 mg) by mouth every 4 hours as needed (Anxiety, Nausea/Vomiting or Sleep)   Multiple Vitamins-Minerals (MULTIVITAMIN & MINERAL PO) 7/2/2018 Self Yes Yes   Sig: Take 1 tablet by mouth daily   NIACIN PO 7/2/2018  Yes Yes   Sig: Take by mouth daily   amylase-lipase-protease (CREON) 59435 UNITS CPEP Past Week  No Yes   Sig: Take 2 capsules (72,000 Units) by mouth 3 times daily (with meals) And 1 capsule with each snack tid.   cyanocobalamin (VITAMIN  B-12) 1000 MCG tablet 7/2/2018  Yes Yes   Sig: Take 1,000 mcg by mouth daily   dronabinol (MARINOL) 5 MG capsule Past Week  No Yes   Sig: Take 1 capsule (5 mg) by mouth 2 times daily (before meals)   fentaNYL (DURAGESIC) 25 mcg/hr 72 hr patch 6/30/2018 at PM  No Yes   Sig: Place 1 patch onto the skin every 72 hours remove old patch.   levofloxacin  (LEVAQUIN) 500 MG tablet Past Week  No Yes   Sig: Take 1 tablet (500 mg) by mouth daily   metroNIDAZOLE (FLAGYL) 500 MG tablet Past Week  No Yes   Sig: Take 1 tablet (500 mg) by mouth 3 times daily for 7 days   naloxone (NARCAN) nasal spray   No No   Sig: Spray 1 spray (4 mg) into one nostril alternating nostrils as needed for opioid reversal every 2-3 minutes until assistance arrives   omeprazole (PRILOSEC) 20 MG CR capsule 7/3/2018  No Yes   Sig: Take 1 capsule (20 mg) by mouth daily   ondansetron (ZOFRAN-ODT) 8 MG ODT tab 7/3/2018  No Yes   Sig: Take 1 tablet (8 mg) by mouth every 8 hours as needed for nausea   opium tincture 10 MG/ML (1%) liquid Past Month  No Yes   Sig: Take 0.6 mLs (6 mg) by mouth 4 times daily   oxyCODONE IR (ROXICODONE) 10 MG tablet 7/3/2018  No Yes   Sig: Take 0.5-1 tablets (5-10 mg) by mouth every 4 hours as needed for moderate to severe pain   parenteral nutrition - PTA/DISCHARGE ORDER   Yes No   Sig: Inject into the vein daily See medication history note from 2/20/18 for most recent TPN formula.      Facility-Administered Medications: None     Allergies   Allergies   Allergen Reactions     Liquid Adhesive Rash     Dermabond, rash with pustules, occurred with abdominal surgery and port removal        Social History   I have reviewed this patient's social history and updated it with pertinent information if needed. Sebas Lopez  reports that he has never smoked. He has never used smokeless tobacco. He reports that he drinks about 3.0 oz of alcohol per week  He reports that he does not use illicit drugs.    Family History   I have reviewed this patient's family history and updated it with pertinent information if needed.   Family History   Problem Relation Age of Onset     Hypertension Father      Diabetes Father        Review of Systems   The 10 point Review of Systems is negative other than noted in the HPI or here.     Physical Exam   Temp: 98.7  F (37.1  C) Temp src: Oral BP:  100/68 Pulse: 111 Heart Rate: 103 Resp: 18 SpO2: 98 % O2 Device: None (Room air)    Vital Signs with Ranges  Temp:  [98.7  F (37.1  C)-99.1  F (37.3  C)] 98.7  F (37.1  C)  Pulse:  [111] 111  Heart Rate:  [] 103  Resp:  [11-18] 18  BP: (100-111)/(68-75) 100/68  SpO2:  [96 %-99 %] 98 %  153 lbs 0 oz    Constitutional: Pleasant thin male seen sitting up in bed, in NAD. Alert and interactive. Appears uncomfortable.  HEENT: NCAT, anicteric sclerae, conjunctiva clear. Moist mucous membranes.  Respiratory: Non-labored breathing, good air exchange. Lungs are clear to auscultation bilaterally, without wheezing, crackles or rhonchi. No cough noted.   Cardiovascular: Regular rate and rhythm with no murmur, rub or gallop.  GI: Quiet BS. Abdomen is mildly distended, tender to palpation diffusely, although moreso on left side. Ileostomy on right side of abdomen with scant amount of brown liquid output. Mild guarding. Healed midline incision.  Skin: Warm and dry. No rashes or lesions on exposed surfaces.  Musculoskeletal: Extremities grossly normal. No tenderness or edema present.   Neurologic: A &O x3, speech normal, answering questions appropriately. Moves all extremities spontaneously. Grossly non-focal.  Neuropsychiatric: Mentation and affect normal/appropriate.  VAD: PICC is c/d/i with no erythema, drainage, or tenderness.    Data   Results for orders placed or performed during the hospital encounter of 07/03/18 (from the past 24 hour(s))   CBC with platelets differential   Result Value Ref Range    WBC 28.5 (H) 4.0 - 11.0 10e9/L    RBC Count 3.61 (L) 4.4 - 5.9 10e12/L    Hemoglobin 10.1 (L) 13.3 - 17.7 g/dL    Hematocrit 29.5 (L) 40.0 - 53.0 %    MCV 82 78 - 100 fl    MCH 28.0 26.5 - 33.0 pg    MCHC 34.2 31.5 - 36.5 g/dL    RDW 16.2 (H) 10.0 - 15.0 %    Platelet Count 548 (H) 150 - 450 10e9/L    Diff Method Manual Differential     % Neutrophils 64.4 %    % Lymphocytes 5.2 %    % Monocytes 25.2 %    % Eosinophils 0.0 %     % Basophils 0.0 %    % Metamyelocytes 1.7 %    % Myelocytes 0.9 %    % Promyelocytes 2.6 %    Absolute Neutrophil 18.4 (H) 1.6 - 8.3 10e9/L    Absolute Lymphocytes 1.5 0.8 - 5.3 10e9/L    Absolute Monocytes 7.2 (H) 0.0 - 1.3 10e9/L    Absolute Eosinophils 0.0 0.0 - 0.7 10e9/L    Absolute Basophils 0.0 0.0 - 0.2 10e9/L    Absolute Metamyelocytes 0.5 (H) 0 10e9/L    Absolute Myelocytes 0.3 (H) 0 10e9/L    Absolute Promyeloctyes 0.7 (H) 0 10e9/L    Anisocytosis Moderate     Platelet Estimate Confirming automated cell count    Comprehensive metabolic panel   Result Value Ref Range    Sodium 126 (L) 133 - 144 mmol/L    Potassium 3.1 (L) 3.4 - 5.3 mmol/L    Chloride 81 (L) 94 - 109 mmol/L    Carbon Dioxide 32 20 - 32 mmol/L    Anion Gap 12 3 - 14 mmol/L    Glucose 127 (H) 70 - 99 mg/dL    Urea Nitrogen 22 7 - 30 mg/dL    Creatinine 1.03 0.66 - 1.25 mg/dL    GFR Estimate 75 >60 mL/min/1.7m2    GFR Estimate If Black >90 >60 mL/min/1.7m2    Calcium 9.0 8.5 - 10.1 mg/dL    Bilirubin Total 0.2 0.2 - 1.3 mg/dL    Albumin 2.1 (L) 3.4 - 5.0 g/dL    Protein Total 7.5 6.8 - 8.8 g/dL    Alkaline Phosphatase 134 40 - 150 U/L    ALT 12 0 - 70 U/L    AST 23 0 - 45 U/L   Lipase   Result Value Ref Range    Lipase 91 73 - 393 U/L   Creatinine POCT   Result Value Ref Range    Creatinine 1.1 0.66 - 1.25 mg/dL    GFR Estimate 70 >60 mL/min/1.7m2    GFR Estimate If Black 84 >60 mL/min/1.7m2   CT Abdomen Pelvis w Contrast    Narrative    EXAMINATION: CT ABDOMEN PELVIS W CONTRAST, 7/3/2018 1:11 PM    TECHNIQUE:  Helical CT images from the lung bases through the  symphysis pubis were obtained with IV contrast. Contrast dose:  iopamidol (ISOVUE-370) solution 93 mL       COMPARISON: 6/27/2018 CT chest/abdomen/pelvis    HISTORY: diffuse lower abdominal pain with bloating; s/p colon stents  placement for obstructing colon CA;     FINDINGS:    Abdomen and pelvis: Stable position of a rectosigmoid stent traversing  the large colonic mass with  increased dilation of the upstream colon  with fluid. The colonic wall just proximal to the proximal end of the  stent appears to be draped over the proximal luminal orifice of the  stent. The rectal wall is diffusely mildly thickened. No free air,  portal venous gas or pneumatosis.    Stable surgical changes of distal small bowel resection with right  midabdominal end ileostomy.    No substantial change in extensive confluent peritoneal carcinomatosis  about the rectosigmoid colon mass extending superiorly along and  within the anterior abdominal wall deep and superficial to an  abdominal wall hernia repair mesh and through the patient's ileostomy  defect. Scattered additional peritoneal carcinomatosis deposits  throughout the upper abdomen.    Peritoneal implants along the hepatic margin/capsule. Otherwise, the  liver, gallbladder, spleen, pancreas, adrenal glands, and kidneys are  unremarkable. The major abdominal vessels are patent. Stable  prominent, nonenlarged periaortic retroperitoneal and iliac chain  lymph nodes.    Lung bases/lower chest:  Bilateral dependent subsegmental atelectasis.  Otherwise clear. Partially imaged PICC.    Bones and soft tissues: Unchanged scattered lucencies in the iliac  bones. Surgical changes of left total hip arthroplasty. Chronic  lateral left rib fracture deformities. No acute osseous abnormality.      Impression    IMPRESSION:  1. Increased fluid dilation of the colon upstream of a rectosigmoid  stent (traversing a colon mass). The colonic wall is draped over the  proximal orifice of the stent, likely impairing passage of luminal  contents into the stent with resultant obstruction.  2. Persistent diffuse colonic wall thickening, likely representing  ongoing colitis.  3. No significant change in extensive peritoneal carcinomatosis, as  described.    I have personally reviewed the examination and initial interpretation  and I agree with the findings.    WILLIAM OLIVIER MD    Lactic acid   Result Value Ref Range    Lactic Acid 1.7 0.7 - 2.0 mmol/L   UA with Microscopic   Result Value Ref Range    Color Urine Yellow     Appearance Urine Clear     Glucose Urine Negative NEG^Negative mg/dL    Bilirubin Urine Negative NEG^Negative    Ketones Urine Negative NEG^Negative mg/dL    Specific Gravity Urine 1.050 (H) 1.003 - 1.035    Blood Urine Negative NEG^Negative    pH Urine 6.5 5.0 - 7.0 pH    Protein Albumin Urine 10 (A) NEG^Negative mg/dL    Urobilinogen mg/dL Normal 0.0 - 2.0 mg/dL    Nitrite Urine Negative NEG^Negative    Leukocyte Esterase Urine Negative NEG^Negative    Source Midstream Urine     WBC Urine <1 0 - 5 /HPF    RBC Urine <1 0 - 2 /HPF    Mucous Urine Present (A) NEG^Negative /LPF   Platelet count   Result Value Ref Range    Platelet Count 505 (H) 150 - 450 10e9/L

## 2018-07-03 NOTE — ED PROVIDER NOTES
History     Chief Complaint   Patient presents with     Abdominal Pain     HPI  Sebas Lopez is a 53 year old male with a history of ulcerative colitis complicated by sigmoid adenocarcinoma with peritoneal metastases (on chemotherapy infusions of Oxaliplatin and Fluorouracil) and ileostomy who presents to the Emergency Department today for evaluation of abdominal pain and abdominal bloating. The patient was last admitted to our hospital for similar symptoms when he had colonic stent placed on 3/19 which improved his symptoms.    Today, the patient reports that he has been having an increase in abdominal pain and abdominal bloating since his last chemotherapy infusion, 3 weeks ago.  The patient had an oncology visits on 6/28 at which time he complained of his abdominal pain and abdominal bloating as well as low-grade fevers and was started on levofloxacin and Flagyl and had his chemotherapy delayed by 1 week.  The patient reports that his abdominal pain and bloating have continued to increase since his visit on 6/28.  He reports that his abdominal pain is in his lower abdomen and it is constant, diffuse, sharp, mild to moderate, nonradiating, nothing makes it better or worse.  He denies any fevers currently.  No vomiting.  The patient also notes that he has been using his fentanyl patches and taking his oxycodone but states that it is no longer helping.     CT chest/abdomen/pelvis performed on 6/27  IMPRESSION:   In this patient with known history of extensive peritoneal  carcinomatosis associated with sigmoid adenocarcinoma:  1a. Status post sigmoid stent. Increased colonic and rectal fluid  since CT 5/3/2018, favored to represent colitis.  1b. Redemonstration of extensive peritoneal carcinomatosis with near  obliteration of pelvic fat planes. Overall disease has slightly  progressed.  1c. No significant change hepatic scalloping.  2. No evidence of metastasis in the chest.    I have reviewed the  "Medications, Allergies, Past Medical and Surgical History, and Social History in the Epic system.    Review of Systems   Constitutional: Negative for chills and fever.   HENT: Negative for congestion.    Eyes: Negative for visual disturbance.   Respiratory: Negative for shortness of breath.    Cardiovascular: Negative for chest pain.   Gastrointestinal: Positive for abdominal distention and abdominal pain. Negative for constipation, diarrhea, nausea and vomiting.   Endocrine: Negative for polydipsia and polyuria.   Genitourinary: Negative for difficulty urinating and dysuria.   Musculoskeletal: Negative for back pain, neck pain and neck stiffness.   Skin: Negative for color change.   Allergic/Immunologic: Positive for immunocompromised state.   Neurological: Negative for light-headedness.   Hematological: Does not bruise/bleed easily.   Psychiatric/Behavioral: Negative for agitation and behavioral problems.       Physical Exam   BP: 111/75  Pulse: 111  Heart Rate: 106  Temp: 99.1  F (37.3  C)  Resp: 16  Height: 175.3 cm (5' 9\")  SpO2: 99 %    Physical Exam   Constitutional: He is oriented to person, place, and time. He appears well-developed and well-nourished. No distress.   HENT:   Head: Normocephalic and atraumatic.   Mouth/Throat: Oropharynx is clear and moist. No oropharyngeal exudate.   Eyes: Conjunctivae and EOM are normal. No scleral icterus.   Neck: Normal range of motion. Neck supple.   Cardiovascular: Normal heart sounds and intact distal pulses.    Tachycardia   Pulmonary/Chest: Effort normal and breath sounds normal. No respiratory distress. He has no wheezes. He has no rales.   Abdominal: Soft. Bowel sounds are normal. He exhibits distension. There is tenderness. There is no rebound and no guarding.   Ostomy bag in right mid abdomen with brown liquid stool.  There is mild distention of the abdomen, diffusely.  There is diffuse mild tenderness to palpation of lower, diffuse abdomen.   Musculoskeletal: " Normal range of motion. He exhibits no edema or tenderness.   Neurological: He is alert and oriented to person, place, and time. No cranial nerve deficit. He exhibits normal muscle tone. Coordination normal.   Skin: Skin is warm. No rash noted. He is not diaphoretic.   Psychiatric: He has a normal mood and affect. His behavior is normal. Judgment and thought content normal.   Nursing note and vitals reviewed.      ED Course   12:01 PM  The patient was seen and examined by Dr. Haas in Room 12.    ED Course     Procedures             Critical Care time:  none             Labs Ordered and Resulted from Time of ED Arrival Up to the Time of Departure from the ED   CBC WITH PLATELETS DIFFERENTIAL - Abnormal; Notable for the following:        Result Value    WBC 28.5 (*)     RBC Count 3.61 (*)     Hemoglobin 10.1 (*)     Hematocrit 29.5 (*)     RDW 16.2 (*)     Platelet Count 548 (*)     Absolute Neutrophil 18.4 (*)     Absolute Monocytes 7.2 (*)     Absolute Metamyelocytes 0.5 (*)     Absolute Myelocytes 0.3 (*)     Absolute Promyeloctyes 0.7 (*)     All other components within normal limits   COMPREHENSIVE METABOLIC PANEL - Abnormal; Notable for the following:     Sodium 126 (*)     Potassium 3.1 (*)     Chloride 81 (*)     Glucose 127 (*)     Albumin 2.1 (*)     All other components within normal limits   ROUTINE UA WITH MICROSCOPIC - Abnormal; Notable for the following:     Specific Gravity Urine 1.050 (*)     Protein Albumin Urine 10 (*)     Mucous Urine Present (*)     All other components within normal limits   LIPASE   CREATININE POCT   LACTIC ACID WHOLE BLOOD   ISTAT CREATININE NURSING POCT   PERIPHERAL IV CATHETER       Consults  Surgery: Responded (07/03/18 [id number])    Assessments & Plan (with Medical Decision Making)   53-year-old male with a history of colon cancer presenting with diffuse lower abdominal with bloating.  Differential diagnosis: Cancer pain, bowel obstruction, infection.    After thorough history  and physical exam, patient appears to be no acute distress.  I will treat his pain with IV Dilaudid and hydrate him with a bolus of intravenous lactated Ringer and obtain laboratory studies and CT abdomen/pelvis.  He and his girlfriend agree with the plan.    Patient's laboratory studies returned with significant leukocytosis, WBC is 28,500. There is evidence of anemia, hemoglobin is 10.1.  Electrolytes show no hyponatremia with sodium 126 and hypokalemia with potassium 3.1.  I will treated with intravenous potassium chloride infusion.  Creatinine is normal at 1.03.  LFTs and lipase are normal.  I reviewed patient's CT abdomen/pelvis and I read the radiology report; it appears that he has colonic obstruction due to colon cancer, but proximal to prior stent placement.  Case was discussed with oncology service who will admit him.  Also consulted colorectal surgery who will evaluate the patient in the emergency department.  Lactic acid was obtained after the CT scan resulted and it is normal at 1.7.  GI service was also consulted to evaluate the patient.    I have reviewed the nursing notes.    I have reviewed the findings, diagnosis, plan and need for follow up with the patient.    New Prescriptions    No medications on file       Final diagnoses:   Colonic obstruction   Hyponatremia   Hypokalemia   Jake PERRIN, am serving as a trained medical scribe to document services personally performed by Saurav Haas MD, based on the provider's statements to me.   Saurav PERRIN MD, was physically present and have reviewed and verified the accuracy of this note documented by Jake Davenport.     7/3/2018   [facility], [place], EMERGENCY DEPARTMENT     Saurav Haas MD  07/03/18 [id number]

## 2018-07-03 NOTE — CONSULTS
GASTROENTEROLOGY CONSULTATION      Date of Admission:  7/3/2018           Reason for Consultation:   We were asked by Dr. Ortega of heme/onc to evaluate this patient with colon cancer.           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  Patient noted to have impressive white count which should prompt infectious evaluation (though could be stress response).  I would recommend c.diff testing from the rectum in addition to normal infectious work up.  Patient's description of pain better when having rectal output and review of imaging showing proximal and distal obstruction of the right colon raises concerntions in that portion are increasing pressure and causing pain.  This may be amenable to a third colonic stent. Will discuss with endoscopist who did prior colonic stenting to see if this is a treatment option.       Recommendations  -NPO  -c.diff test from rectum  -f/u infectious work up  -hold anticoagulation at midnight  -Utility of flexible sigmoidoscopy and repeat colonic stent will be discussed with Dr. Rios    Gastroenterology outpatient follow up recommendations: pending clinical course    Thank you for involving us in this patient's care. Please do not hesitate to contact the GI service with any questions or concerns.     Pt care plan discussed with Dr. Richardson, GI staff physician.    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    On Consultation, the patient reports he was diagnosed with stage IV sigmoid adenocarcinoma with carcinomatosis about 1 year prior to admission.  Notes suggest patient underwent small bowel resection and ileostomy July 2017.  December 2017 he underwent expiratory laparotomy that was supported due to diffuse carcinomatosis.  Recurrent obstructive symptoms prompted a colonoscopy with colonic stenting of the sigmoid in February and repeat in March 2017.  Patient found increased relief in abdominal cramping, rectal output, and nausea after suctioning the colonic stent was placed.  He reports he was in stable state of health on palliative treatment until about 3 weeks prior.  At that time he started palliative chemotherapy with FOLFOX.  She started to note increased abdominal bloating and cramping abdominal pain but no obvious change in ostomy or rectal.  During that time, he had a low-grade fever of 100.1 Fahrenheit.  He reports he was put on a course of Levaquin and Flagyl empirically and this was stopped by patient due to upset by day 4 of treatment.      Patient's abdominal cramping, nausea, and bloating continued to progress to severe abdominal pain 2 days prior to admission.  He describes it as a sharp stabbing \"crampy pain that did not radiate and was worsened with eating.  Worsening pain triggered nausea and bloating.  He reports ileostomy output every 2 hours and rectal output every 3 hours.  Ostomy output is brown and soft.  Rectal output is mucous, white, trace changes of red.  He notes relief pain when this rectal output significant.  He treats his pain at home with fentanyl patches and oral narcotics but they have not been sufficient the last couple days.              Past Medical History: "   Reviewed and edited as appropriate  Past Medical History:   Diagnosis Date     Adenocarcinoma of sigmoid colon (H)     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     Ulcerative colitis (H)             Past Surgical History:   Reviewed and edited as appropriate   Past Surgical History:   Procedure Laterality Date     COLONOSCOPY  2017     COLONOSCOPY N/A 11/30/2017    Procedure: COLONOSCOPY;  Colonoscopy;  Surgeon: Rob Dudley MD;  Location: [facility]     COLONOSCOPY N/A 2/6/2018    Procedure: COMBINED COLONOSCOPY, STENT PLACEMENT;  COMBINED COLONOSCOPY with Colonic Stent Placement;  Surgeon: Guru Lionel Mar MD;  Location: [facility]     COLONOSCOPY N/A 3/19/2018    Procedure: COMBINED COLONOSCOPY, STENT PLACEMENT;  flexible sigmoidoscopy with stent placement and dilation;  Surgeon: Alexis Rios MD;  Location: [facility]     GI SURGERY  07/10/2017    Extensive lysis of adhesions, small bowel resection with end ileostomy.      HERNIA REPAIR       INSERT PORT VASCULAR ACCESS Right 8/10/2017    Procedure: INSERT PORT VASCULAR ACCESS;  Single Lumen Right Chest Power Port;  Surgeon: Malena Andrew PA-C;  [facility]     LAPAROSCOPY DIAGNOSTIC (GENERAL) N/A 12/6/2017    Procedure: LAPAROSCOPY DIAGNOSTIC (GENERAL);  Diagnostic Laparoscopy, Exploratory Laparotomy Anesthesia Block ;  Surgeon: Rob Dudley MD;  Location: [facility]     LAPAROTOMY EXPLORATORY N/A 12/6/2017    Procedure: LAPAROTOMY EXPLORATORY;;  Surgeon: Rob Dudley MD;  Location: [facility]     ORTHOPEDIC SURGERY Left     HIP ARTHROPLASTY     PICC INSERTION Right 02/23/2018    5Fr DL BioFlo PICC, 44cm (3cm external) in the R basilic vein w/ tip in the SVC RA junction            Previous Endoscopy:     Results  for orders placed or performed during the hospital encounter of 02/05/18   COLONOSCOPY   Result Value Ref Range    COLONOSCOPY       [facility], [place]  [address] [place] [address] [phone]     Endoscopy Department  _______________________________________________________________________________  Patient Name: Sebas Lopez            Procedure Date: 2/6/2018 4:21 PM  MRN: [id number]                       Account Number: [id number]  YOB: 1965              Admit Type: Inpatient  Age: 52                                Gender: Male  Note Status: Finalized                Attending MD: Guru Mar ,   Total Sedation Time:                    _______________________________________________________________________________     Procedure:           Colonoscopy  Indications:         Colonic stent placement                       52 year old white Â male with history of ulcerative                        colitis with a diagnosis of Stage IV Sigmoid                        Adenocarcinoma with peritoneal carcinomatosis admitted                        with generalized weakness, leukocytosis , hyponatremia,                        abdominal pain and nausea/vomiting with CT showing an                        obstructing sigmoid colonic mass with progression of                        diffuse colonic distention. Colonoscopy under fluroscopy                        with plans to perform palliative colonic stenting across                        the obstructing mass  Providers:           Alexis Nazario MD  Referring MD:        Rob Dudley MD  Medicines:           General Anesthesia  Complications:       No immediate complications.  _______________________________________________________________________________  Procedure:           Pre-Anesthesia Assessment:                       - Prior to the procedure, a History and Physical was                        performed,  and patient medications and allergies were                        reviewed. The patient is competent. The risks and                        benefits of the procedure and the sedation opti ons and                        risks were discussed with the patient. All questions                        were answered and informed consent was obtained. Patient                        identification and proposed procedure were verified by                        the physician and the nurse in the pre-procedure area.                        Mental Status Examination: alert and oriented. Airway                        Examination: normal oropharyngeal airway and neck                        mobility. Respiratory Examination: clear to                        auscultation. CV Examination: normal. Prophylactic                        Antibiotics: The patient does not require prophylactic                        antibiotics. Prior Anticoagulants: The patient has taken                        no previous anticoagulant or antiplatelet agents. ASA                        Grade Assessment: III - A patient with severe systemic                        disease. After reviewing the risks and benefits, the                         patient was deemed in satisfactory condition to undergo                        the procedure. The anesthesia plan was to use general                        anesthesia. Immediately prior to administration of                        medications, the patient was re-assessed for adequacy to                        receive sedatives. The heart rate, respiratory rate,                        oxygen saturations, blood pressure, adequacy of                        pulmonary ventilation, and response to care were                        monitored throughout the procedure. The physical status                        of the patient was re-assessed after the procedure.                       After obtaining informed consent, the colonoscope was                         passed under direct vision. Throughout the procedure,                        the patient's blood pressure, pulse, and oxygen                        saturations were monitored continuously. The Endoscope                         was introduced through the anus and advanced to the                        sigmoid colon to examine a mass. This was the intended                        extent. The colonoscopy was performed without                        difficulty. The patient tolerated the procedure well.                                                                                   Findings:       The perianal examination was normal.       The single channel upper endoscope was advanced to the level of the mass        lesion. A fungating and infiltrative completely obstructing large mass        was found in the sigmoid colon at 20 cm from the anal verge. The mass        was circumferential (involving 100% of the lumen circumference). No        bleeding was present. A 0.025inch Visiglide wire was advanced through a        9-12 mm stone extraction balloon catheter under fluoroscopic guidance        through the stenotic lumen. The catheter was advanced over the wire and        contrast was injected. The krissy notic area was 2 to 3 cm in length. A 25mm        by 10 cm Evolution colonic stent was placed under endoscopic and        fluoroscopic guidance. Liquid stool was seen flowing through the stent.        The proximal end of the stent was at 15 cm in the rectosigmoid postion.        The stent was in good position.                                                                                   Impression:          - Malignant completely obstructing tumor in the sigmoid                        colon consistent with adenocarcinoma and a 2- 3 cm                        stricture as visualized on CT scan                       - Uncomplicated placement of 25 mm by 10 cm Evolution                        colonic  stent across the obstructing sigmoid mass with                        passage of foul smelling liquid stools  Recommendation:      - [facility].                       - Patient has been warned of post procedure                        complications incl uding stent migration and perforation.                        He also understands that this procedure was palliative.                       - Inpatient panc-bili consult to follow patient                                                                                     ____________________  Guru Mar,   2/7/2018 8:11:46 AM    _____________________  Alexis Rios MD  Number of Addenda: 0    Note Initiated On: 2/6/2018 4:21 PM  Scope In:  Scope Out:              Social History:   Reviewed and edited as appropriate  Social History     Social History     Marital status: Single     Spouse name: N/A     Number of children: N/A     Years of education: N/A     Occupational History     Not on file.     Social History Main Topics     Smoking status: Never Smoker     Smokeless tobacco: Never Used     Alcohol use 3.0 oz/week     5 Cans of beer per week      Comment: none for last 4 months     Drug use: No     Sexual activity: Not on file     Other Topics Concern     Not on file     Social History Narrative            Family History:   Reviewed and edited as appropriate  Family History   Problem Relation Age of Onset     Hypertension Father      Diabetes Father         No known history of colorectal cancer, liver disease, or inflammatory bowel disease.       Allergies:   Reviewed and edited as appropriate     Allergies   Allergen Reactions     Liquid Adhesive Rash     Dermabond, rash with pustules, occurred with abdominal surgery and port removal             Medications:     Prescriptions Prior to Admission   Medication Sig Dispense Refill Last Dose     amylase-lipase-protease (CREON) 53956 UNITS CPEP Take 2 capsules (72,000  Units) by mouth 3 times daily (with meals) And 1 capsule with each snack tid. 240 capsule 3 Past Week     cyanocobalamin (VITAMIN  B-12) 1000 MCG tablet Take 1,000 mcg by mouth daily   7/2/2018     dronabinol (MARINOL) 5 MG capsule Take 1 capsule (5 mg) by mouth 2 times daily (before meals) 60 capsule 0 Past Week     fentaNYL (DURAGESIC) 25 mcg/hr 72 hr patch Place 1 patch onto the skin every 72 hours remove old patch. 1 patch 0 6/30/2018 at PM     levofloxacin (LEVAQUIN) 500 MG tablet Take 1 tablet (500 mg) by mouth daily 7 tablet 0 Past Week     LORazepam (ATIVAN) 0.5 MG tablet Take 1 tablet (0.5 mg) by mouth every 4 hours as needed (Anxiety, Nausea/Vomiting or Sleep) 30 tablet 5 7/2/2018     metroNIDAZOLE (FLAGYL) 500 MG tablet Take 1 tablet (500 mg) by mouth 3 times daily for 7 days 21 tablet 0 Past Week     Multiple Vitamins-Minerals (MULTIVITAMIN & MINERAL PO) Take 1 tablet by mouth daily   7/2/2018     NIACIN PO Take by mouth daily   7/2/2018     omeprazole (PRILOSEC) 20 MG CR capsule Take 1 capsule (20 mg) by mouth daily 30 capsule 11 7/3/2018     ondansetron (ZOFRAN-ODT) 8 MG ODT tab Take 1 tablet (8 mg) by mouth every 8 hours as needed for nausea 60 tablet 5 7/3/2018     opium tincture 10 MG/ML (1%) liquid Take 0.6 mLs (6 mg) by mouth 4 times daily 72 mL 0 Past Month     oxyCODONE IR (ROXICODONE) 10 MG tablet Take 0.5-1 tablets (5-10 mg) by mouth every 4 hours as needed for moderate to severe pain 90 tablet 0 7/3/2018     naloxone (NARCAN) nasal spray Spray 1 spray (4 mg) into one nostril alternating nostrils as needed for opioid reversal every 2-3 minutes until assistance arrives 0.2 mL 0 Not Taking     parenteral nutrition - PTA/DISCHARGE ORDER Inject into the vein daily See medication history note from 2/20/18 for most recent TPN formula.   Taking             Review of Systems:   A complete review of systems was performed and is negative except as noted in the HPI           Physical Exam:   /71   "Pulse 111  Temp 99.1  F (37.3  C) (Oral)  Resp 11  Ht 1.753 m (5' 9\")  SpO2 98%  Wt:   Wt Readings from Last 2 Encounters:   06/28/18 69.2 kg (152 lb 9.6 oz)   06/27/18 70.5 kg (155 lb 8 oz)      Constitutional: cooperative, pleasant, not dyspneic/diaphoretic, no acute distress  Eyes: Sclera anicteric/injected  Ears/nose/mouth/throat: Normal oropharynx without ulcers or exudate, mucus membranes moist, hearing intact  Neck: supple, thyroid normal size  CV: No edema  Respiratory: Unlabored breathing  Lymph: No axillary, submandibular, supraclavicular or inguinal lymphadenopathy  Abd: soft, Nondistended, +bs, mass palpated in the mid abdomen, ileostomy has brown stool, tender to palpation on left side predominantly, no rebound   Skin: warm, perfused, no jaundice  Neuro: AAO x 3, No asterixis  Psych: Normal affect  MSK: No gross deformities         Data:   Labs and imaging below were independently reviewed and interpreted    BMP  Recent Labs  Lab 07/03/18  1227 06/27/18  1040   * 131*   POTASSIUM 3.1* 3.7   CHLORIDE 81* 95   ANNAMARIE 9.0 9.1   CO2 32 24   BUN 22 24   CR 1.03 1.09   * 99     CBC  Recent Labs  Lab 07/03/18  1227 06/27/18  1040   WBC 28.5* 12.4*   RBC 3.61* 3.85*   HGB 10.1* 10.7*   HCT 29.5* 32.4*   MCV 82 84   MCH 28.0 27.8   MCHC 34.2 33.0   RDW 16.2* 16.8*   * 383     INRNo lab results found in last 7 days.  LFTs  Recent Labs  Lab 07/03/18  1227 06/27/18  1040   ALKPHOS 134 151*   AST 23 24   ALT 12 20   BILITOTAL 0.2 0.4   PROTTOTAL 7.5 8.4   ALBUMIN 2.1* 2.5*      PANC  Recent Labs  Lab 07/03/18  1227   LIPASE 91       Imaging:  CT abdomen/pelvis:  IMPRESSION:  1. Increased fluid dilation of the colon upstream of a rectosigmoid  stent (traversing a colon mass). The colonic wall is draped over the  proximal orifice of the stent, likely impairing passage of luminal  contents into the stent with resultant obstruction.  2. Persistent diffuse colonic wall thickening, likely " representing  ongoing colitis.  3. No significant change in extensive peritoneal carcinomatosis, as  described.

## 2018-07-04 NOTE — PLAN OF CARE
Problem: Patient Care Overview  Goal: Plan of Care/Patient Progress Review  OT 7D: OT orders received and acknowledged. Per discussion with PT and chart review, no acute OT needs identified. Pt with expected short LOS and has remained IND with ADLs. Spouse and pt deny any concerns for d/c home. Will complete orders. Please reconsult given change in pt status.

## 2018-07-04 NOTE — PROGRESS NOTES
Nursing Focus: Admission    D: Arrived at 1545 from ED. Patient accompanied by significant other. Admitted for increasing abdominal pain and bloating. Complains of left abdominal pain.      I: Admission process began.  Patient oriented to room, enviroment, call light.  Md. notified of patients arrival on unit.     A: Vital signs stable, afebrile.  Patient stable at this time.      P: Implement plan of care when available. Continue to monitor patient. Nursing interventions as appropriate. Notify md with changes in pt status.

## 2018-07-04 NOTE — PROGRESS NOTES
CLINICAL NUTRITION SERVICES - ASSESSMENT NOTE     Nutrition Prescription    RECOMMENDATIONS FOR MDs/PROVIDERS TO ORDER:  -Diet advancement per team  -Pt receiving 2400mL (35mL/kg) daily IVF, please notify RD/pharm if provider would like different volume.      Malnutrition Status:    -Non-severe    Recommendations already ordered by Registered Dietitian (RD):  -Updated TPN order (increased calories/protein) to provide 100% of nutrition as follows:   2400mL/day x 12 hours with 250 g dextrose, 100 g AA, and 250 mL 20% IV lipids daily which provides  1750kcal (25 kcal/kg), 1.5 g/kg PRO, GIR 5.0, and 29% kcal from fat Dosing weight 69 kg.    Future/Additional Recommendations:  -Once diet advances monitor PO intakes and need to adjust PN.  -Replace electrolytes per protocol     REASON FOR ASSESSMENT  Sebas Lopez is a/an 53 year old male assessed by the dietitian for Pharmacy/Nutrition to Start and Manage PN    NUTRITION HISTORY  Pt started on PN in February 2018 for bowel obstruction and decreased appetite and pain with chemotherapy. He last received chemo ~ 3 weeks ago and for the last 1.5 weeks he has eaten very little. Prior to chemo pt notes doing really well and eating 2600 calories per day. Plan is to to have another round of chemo soon.    He is prescribed marinol but does not take this all the time and doesn't think it works.     Per [facility]: 2400mL x 12 hours, 195g dextrose, 75g amino acids + 250mL 20% lipids x 4 days. This provides: 1366(20kcals/kg) and 1.1g/kg protein. Pt notes he was slowly weaning TPN d/t increased PO intakes.     UBW: 185lbs- weighed this 1 year ago.    PMH: UC, sigmoid adenocarcinoma with peritoneal carcinomatosis. He is recently status post ileostomy (2017) and colonic stenting (3/2018) of residual colon, which traverses through the sigmoid tumor. He presents with increased pain and abdominal bloating, nausea, vomiting, and decreased appetite. His last cycle of FOLFOX chemo given  "6/15    CURRENT NUTRITION ORDERS  Diet: NPO  Nutrition support PN: 2400mL x 12 hours, 195g dextrose, 75g amino acids + 250mL 20% lipids x 4 days. This provides: 1366(20kcals/kg) and 1.1g/kg protein.     CT abdomen (7/3):   -Increased fluid dilation of the colon upstream of a rectosigmoid stent (traversing a colon mass). The colonic wall is draped over the proximal orifice of the stent, likely impairing passage of luminal contents into the stent with resultant obstruction.  -Persistent diffuse colonic wall thickening, likely representing  ongoing colitis.    LABS  Sodium 129  K 3.2  BS: 126-152    MEDICATIONS  Vitamin B12  marinol  MVI w/minerals  Creon 36-2 capsules  K+ lyte replacement    ANTHROPOMETRICS  Height: 175.3 cm (5' 9\")  Most Recent Weight: 71.1 kg (156 lb 11.2 oz)    IBW: 72.7 kg  BMI: Normal BMI  Weight History:   Wt Readings from Last 10 Encounters:   07/04/18 71.1 kg (156 lb 11.2 oz)   06/28/18 69.2 kg (152 lb 9.6 oz)   06/27/18 70.5 kg (155 lb 8 oz)   06/15/18 69.2 kg (152 lb 9.6 oz)   05/31/18 68.1 kg (150 lb 3.2 oz)   05/18/18 68 kg (150 lb)   05/03/18 67.6 kg (149 lb)   04/19/18 65.2 kg (143 lb 11.2 oz)   04/05/18 65.3 kg (144 lb)   03/29/18 65 kg (143 lb 3.2 oz)   ~9% weight gain in 3 months  Dosing Weight: 69kg    ASSESSED NUTRITION NEEDS  Estimated Energy Needs: 9806-7252+ kcals/day (25 - 30 kcals/kg)  Justification: Maintenance/+ repletion  Estimated Protein Needs:  grams protein/day (1.2 - 1.5 grams of pro/kg)  Justification: Hypercatabolism with critical illness  Estimated Fluid Needs: 1 mL/kcal  Justification: Maintenance    PHYSICAL FINDINGS  See malnutrition section below.    MALNUTRITION  % Intake: Decreased intake does not meet criteria  % Weight Loss: None noted  Subcutaneous Fat Loss: Facial region/Upper arm/ Thoracic/intercostal: at least mild  Muscle Loss: Temporal:  moderate, Thoracic region (clavicle, acromium bone, deltoid, trapezius, pectoral):  Moderate/severe, Upper arm " (bicep, tricep):  At least mild and Upper leg (quadricep, hamstring):  At least mild  Fluid Accumulation/Edema: None noted  Malnutrition Diagnosis: Non-severe malnutrition in the context of acute on chronic illness    NUTRITION DIAGNOSIS  Inadequate protein and energy intake related to current npo with obstruction and hx of decreased appetite/intake with chemo as evidenced by pt verbalization of minimal intakes, w/ NPO diet since admit and current TPN regimen meeting only 20kcals/kg and 1.1g/kg protein daily.    INTERVENTIONS  Implementation  Nutrition Education: Provided education on need to increase TPN d/t poor oral intakes. Also discussed marinol is only effective (if at all) when taken daily. Pt and wife understanding.    Parenteral Nutrition/IV Fluids - Modify-see above discussed with pharm     Goals  Total avg nutritional intake to meet a minimum of 25 kcal/kg and 1.2 g PRO/kg daily (per dosing wt 69 kg).     Monitoring/Evaluation  Progress toward goals will be monitored and evaluated per protocol.    Ally Valdivia RD, LD

## 2018-07-04 NOTE — PLAN OF CARE
Problem: Patient Care Overview  Goal: Plan of Care/Patient Progress Review  7D - Per chart review, discussion with Pt and discussion with RN; Pt with no acute inpatient PT needs at this time. Primary complaint of abdominal bloating/discomfort, Pt and pt wife reports he has been moving well at home with no concerns regarding walking, ADLs, or falls risk. Anticipate short hospitalization per chart review and discussion with patient. Pt reporting no needs at this time, has been up IND using bathroom in room. Will complete PT consult and defer evaluation. Please reconsult if pt has decline in functional mobility requiring further skilled inpatient PT or extended hospitalization with concerns for extensive loss of strength/impaired mobility.

## 2018-07-04 NOTE — PROGRESS NOTES
Hematology / Oncology Progress Note <<07/04/2018>>  ASSESSMENT & PLAN Sebas Lopez is a 53 year old male with PMHx significant for ulcerative colitis, s/p ileostomy 7/2017, and sigmoid adenocarcinoma with peritoneal carcinomatosis s/p colonic stent 3/19 and recent chemotherapy with FOLFOX Cycle #13 6/15/18 who presents with increased abdominal pain and abdominal bloating.      #Increased abdominal pain.   #Increased abdominal bloating.   Patient notes approximately 3 weeks of above symptoms. States this started right after last cycle of chemotherapy given around 6/15/18. Usually resolves after a few days, although this time it did not. Pain has worsened over the course of these weeks, almost unmanageable on current PO regimen. Unable to take much in by mouth due to pain, nausea, and decreased appetite. Denies emesis. Denies change in amount, frequency or consistency of ileostomy output. Endorses maroon colored stool when passes a BM per rectum which is not new.   -CT AP in ED with increased fluid dilation of colon upstream of rectosigmoid stent, colonic wall draped over proximal orifice of stent leading to obstruction, persistent diffuse colonic wall thickening 2/2 ongoing colitis, no significant change in peritoneal carcinomatosis since last imaging.   -GI consulted: they will scope either Thursday or Friday (will order diet for today, but NPO at midnight, will need 2-3 tap water enemas q 2 hours prior to procedure, please call GI in AM 7/5 to determine timing).  -CRS consulted, appreciate recs. No surgical intervention offered.   -Continue PTA oxycodone 5-10 mg q4hrs prn, IV dilaudid 0.5 mg q1hrs prn for breakthrough.   -NPO w/ obstruction.   -C diff from rectal output was NEGATIVE.     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    Follows with Dr. Long outpatient. S/p ex lap 7/2017 with small bowel resection and end ileostomy. S/p 6 cycles of FOLFOX. Admitted on 12/6/2017 for ex-lap  with aborted HIPEC due to diffuse carcinomatosis. Repeat CT scan showed worsening peritoneal carcinomatosis. He was admitted to the hospital with worsening colon distention and colon stent was placed which improved his symptoms. He resumed FOLFOX C7 3/8/18 f/b hospitalization for bowel obstruction and stent replacement 3/19/2018. Continued with FOLFOX, last cycle #13 6/15/18. Repeat CT 6/27 with slightly progressed peritoneal carcinomatosis, increased colonic and rectal fluid favoring colitis. Per Dr. Long's note 6/28 next treatment options would likely be irinotecan and panitumumab within the next 1-2 weeks.   -Will need follow up at discharge.      #Anemia. Hgb 10.1 on admission. Likely 2/2 chronic disease and chemotherapy.   -Transfuse for hgb <7.     #Leukocytosis. Suspicious for infection in setting of increased abdominal pain, obstructive symptoms and cancerous involvement of bowels. Prescribed PO antibiotics by Dr. Long 6/28 however patient states he only took a few doses then independently discontinued.   - Blood cultures x2 >> NTD, WBC down to 27 from 28  -Start on Zosyn 4.5 mg q6 hrs for suspected abdominal source of infection.      #Hyponatremia, hypokalemia.  Likely 2/2 poor PO intake/hydration. Na 126 and K 3.1.  -IVF resuscitation to slowly correct sodium.  -replace lytes per protocol prn.   >> Up to 129 today, will send hyponatremia work up (Osm, UOsm, NaUrine) & continue IVF at 75 cc/hr  >> Check sodium q 4 hours      #Malnutrition.  -Continue PTA TPN support.     # Pain Assessment:  Current Pain Score 3/20/2018   Patient currently in pain? yes   Pain score (0-10) -   Pain location Abdomen   Pain descriptors -   - Sebas is experiencing pain due to sigmoid adenocarcinoma with peritoneal carcinomatosis. Pain management was discussed and the plan was created in a collaborative fashion.  Sebas's response to the current recommendations: engaged  - Please see the plan for pain management as documented  above     FEN  -IVF @75 ml/hr  -replace lytes per protocol prn  -NPO     Prophys  -VTE: SCDs, mechanical for now with pending procedure and h/o maroon stools.  -PUD: PTA omeprazole.  -Bowels: hold d/t obstructive symptoms.     Dispo: Pending improvement in acute symptoms, likely 2-3 day stay.      Cheryl Laytonrisandra ACNP [phone]  Hematology/Oncology    Interval history Sebas is doing okay this AM, still having significant pain (increased dilaudid to q 1 hour, may need to adjust fentanyl patch post procedure based on needs). Discussed case with GI team (will do scope on Th/F -- at that time will need enemas prior). Patient denies N/V. Denies cough/chest pain. Denies fever/chills. Continues on zosyn. Sent hyponatremia work up, continue IVF 75 cc/hr, check NA q 4. No changes in mental status per wife. NPO r/t obstruction. Will continue to hold lovenox PPX.    Physical Exam  Constitutional: Awake, alert, cooperative, in NAD.  Eyes: PERRL, EOMI, sclera clear, conjunctiva normal.  ENT: Normocephalic, without obvious abnormality, moist mucus membranes, no lesions or thrush.   Respiratory: Non-labored breathing, good air exchange, CTAB, no crackles or wheezing.  Cardiovascular: RRR, no murmur noted.  GI: +BS, soft, non-distended, non-tender, no masses palpated, no hepatosplenomegaly.  Skin: No concerning lesions or rash on exposed areas.  Musculoskeletal: No edema hallie LEs  Neurologic: Awake, A&O x 3. Cranial nerves II-XII are grossly intact.   Neuropsychiatric: Calm, normal affect     Rounding:  Temp:  [98.7  F (37.1  C)-99.9  F (37.7  C)] 98.7  F (37.1  C)  Pulse:  [104-111] 111  Heart Rate:  [] 87  Resp:  [11-20] 18  BP: (100-115)/(68-74) 108/71  SpO2:  [96 %-99 %] 97 %    I/O last 3 completed shifts:  In: 436.19   Out: 50 [Stool:50]    Vitals:    07/03/18 1622 07/04/18 0900   Weight: 69.4 kg (153 lb) 71.1 kg (156 lb 11.2 oz)         Recent Labs  Lab 07/04/18  0615 07/03/18  1704 07/03/18  1227   WBC 27.3*  --  28.5*   RBC  3.28*  --  3.61*   HGB 9.1*  --  10.1*   HCT 27.3*  --  29.5*   MCV 83  --  82   MCH 27.7  --  28.0   MCHC 33.3  --  34.2   RDW 16.5*  --  16.2*   * 505* 548*       Recent Labs  Lab 07/04/18  0615 07/03/18  1704 07/03/18  1237 07/03/18  1227   *  --   --  126*   POTASSIUM 3.2*  --   --  3.1*   CHLORIDE 87*  --   --  81*   CO2 32  --   --  32   ANIONGAP 10  --   --  12   *  --   --  127*   BUN 17  --   --  22   CR 0.91 0.94  --  1.03   GFRESTIMATED 87 84 70 75   GFRESTBLACK >90 >90 84 >90   ANNAMARIE 8.3*  --   --  9.0   MAG 2.3  --   --   --    PHOS 3.0  --   --   --    PROTTOTAL  --   --   --  7.5   ALBUMIN  --   --   --  2.1*   BILITOTAL  --   --   --  0.2   ALKPHOS  --   --   --  134   AST  --   --   --  23   ALT  --   --   --  12     Recent Results (from the past 24 hour(s))   CT Abdomen Pelvis w Contrast    Narrative    EXAMINATION: CT ABDOMEN PELVIS W CONTRAST, 7/3/2018 1:11 PM    TECHNIQUE:  Helical CT images from the lung bases through the  symphysis pubis were obtained with IV contrast. Contrast dose:  iopamidol (ISOVUE-370) solution 93 mL       COMPARISON: 6/27/2018 CT chest/abdomen/pelvis    HISTORY: diffuse lower abdominal pain with bloating; s/p colon stents  placement for obstructing colon CA;     FINDINGS:    Abdomen and pelvis: Stable position of a rectosigmoid stent traversing  the large colonic mass with increased dilation of the upstream colon  with fluid. The colonic wall just proximal to the proximal end of the  stent appears to be draped over the proximal luminal orifice of the  stent. The rectal wall is diffusely mildly thickened. No free air,  portal venous gas or pneumatosis.    Stable surgical changes of distal small bowel resection with right  midabdominal end ileostomy.    No substantial change in extensive confluent peritoneal carcinomatosis  about the rectosigmoid colon mass extending superiorly along and  within the anterior abdominal wall deep and superficial to  an  abdominal wall hernia repair mesh and through the patient's ileostomy  defect. Scattered additional peritoneal carcinomatosis deposits  throughout the upper abdomen.    Peritoneal implants along the hepatic margin/capsule. Otherwise, the  liver, gallbladder, spleen, pancreas, adrenal glands, and kidneys are  unremarkable. The major abdominal vessels are patent. Stable  prominent, nonenlarged periaortic retroperitoneal and iliac chain  lymph nodes.    Lung bases/lower chest:  Bilateral dependent subsegmental atelectasis.  Otherwise clear. Partially imaged PICC.    Bones and soft tissues: Unchanged scattered lucencies in the iliac  bones. Surgical changes of left total hip arthroplasty. Chronic  lateral left rib fracture deformities. No acute osseous abnormality.      Impression    IMPRESSION:  1. Increased fluid dilation of the colon upstream of a rectosigmoid  stent (traversing a colon mass). The colonic wall is draped over the  proximal orifice of the stent, likely impairing passage of luminal  contents into the stent with resultant obstruction.  2. Persistent diffuse colonic wall thickening, likely representing  ongoing colitis.  3. No significant change in extensive peritoneal carcinomatosis, as  described.    I have personally reviewed the examination and initial interpretation  and I agree with the findings.    WILLIAM OLIVIER MD     Anti-infectives (Future)    Start     Dose/Rate Route Frequency Ordered Stop    07/03/18 1730  piperacillin-tazobactam (ZOSYN) 4.5 g vial to attach to  mL bag      4.5 g  over 30 Minutes Intravenous EVERY 6 HOURS 07/03/18 1727            cyanocobalamin  1,000 mcg Oral Daily     dronabinol  5 mg Oral BID AC     fentaNYL  25 mcg Transdermal Q72H     fentaNYL   Transdermal Q8H     [START ON 7/6/2018] fentaNYL   Transdermal Q72H     lipids  250 mL Intravenous Once per day on Sun Tue Thu Sat     multivitamin, therapeutic with minerals  1 tablet Oral Daily     omeprazole  20 mg  Oral Daily     piperacillin-tazobactam  4.5 g Intravenous Q6H     sodium chloride (PF)  20 mL Intravenous Once       IV fluid REPLACEMENT ONLY       - MEDICATION INSTRUCTIONS -       parenteral nutrition - ADULT compounded formula CYCLE 220 mL/hr at 07/03/18 7770     Lydia Omer, [facility], [phone].  Hematology/Oncology July 4, 2018

## 2018-07-04 NOTE — PLAN OF CARE
Problem: Pain, Chronic (Adult)  Goal: Identify Related Risk Factors and Signs and Symptoms  Related risk factors and signs and symptoms are identified upon initiation of Human [employer] Clinical Practice Guideline (CPG).   Outcome: No Change  AVSS. A&Ox4. C/o left-sided abdominal pain. PRN dilaudid & oxy and 72 hour fentanyl patch placed on left deltoid. Dilaudid increased to hourly prn.  Pt is NPO except ice chips. Double lumen PICC on right arm. TPN & lipids cycled. IV antibiotics running through purple lumen. Pt has ileostomy which he cares for himself. C-diff negative. Potassium 3.2. Needs replacement. Sodium 129. Started on NS at 75 ccs/hr. Need urine sample. Pt is aware. Possible flexible sigmoidoscopy tomorrow.

## 2018-07-04 NOTE — PLAN OF CARE
Problem: Patient Care Overview  Goal: Plan of Care/Patient Progress Review  Outcome: No Change  D: Colonic obstruction  Hyponatremia  Hypokalemia    I: Monitored vitals and assessed pt status.   Running: TPN and ILs  PRN: Oxycodone and dilaudid prn.    A: A0x4. VSS, Temp max 99.9.  Pain controlled with oxycodone and dilaudid. Continues to be bloated. Up to bathroom independently. Emptying ostomy himself. Sepsis prootcol triggered and lactate pending.         Temp:  [98.7  F (37.1  C)-99.9  F (37.7  C)] 99.6  F (37.6  C)  Pulse:  [109-111] 109  Heart Rate:  [] 87  Resp:  [11-20] 20  BP: (100-115)/(68-75) 115/74  SpO2:  [96 %-99 %] 96 %      P: Continue to monitor Pt status and report changes to treatment team.

## 2018-07-04 NOTE — PLAN OF CARE
Problem: Patient Care Overview  Goal: Plan of Care/Patient Progress Review  Outcome: No Change  AVSS. A&Ox4. C/o left-sided abdominal pain. PRN dilaudid & oxy available. 72 hour fentanyl patch placed on left deltoid. PRN zofran x 1 for nausea. Pt is NPO except ice chips & sips of water. Double lumen PICC on right arm. TPN & lipids started at 2035. Running through grey lumen. Both to run for 12 hours.TPN cycling. IV antibiotics running through purple lumen. Pt has ileostomy bag but also makes some stool. On enteric isolation pending results of c-diff sample. Plan for further consults from GI to decide whether to replace/revise pt's colon stent.

## 2018-07-05 NOTE — OP NOTE
Flex Sig 07/05/2018 12:01 PM [facility], [address] [place] [address] [phone]     Endoscopy Department   _______________________________________________________________________________   Patient Name: Sebas Lopez            Procedure Date: 7/5/2018 12:01 PM   MRN: [id number]                       Account Number: [id number]   YOB: 1965              Admit Type: Inpatient   Age: 53                                Gender: Male   Note Status: Finalized                Attending MD: Alexis Rios MD   Total Sedation Time:                     _______________________________________________________________________________       Procedure:           Flexible Sigmoidoscopy   Indications:         Abnormal CT of the GI tract, Cancer in the sigmoid colon   Providers:           Alexis Rios MD, Junie Valdez MD   Patient Profile:     Mr Lopez is a 54yo gentleman with 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 He fared well since the placement of a second                        sigmoid stent months back and it is noted he has an                        ileostomy. He now proceeds to further management by                        sigmoidoscopy with imaging suggesting repeat stenosis at                        the proximal end of the in situ stents.   Referring MD:        Rob Dudley MD   Medicines:           General Anesthesia   Complications:       No immediate complications.   _______________________________________________________________________________   Procedure:           Pre-Anesthesia Assessment:                        - Prior to the procedure, a History and Physical was                        performed, and patient  medications and allergies were                        reviewed. The patient is competent. The risks and                        benefits of the procedure and the sedation options and                        risks were discussed with the patient. All questions                        were answered and informed consent was obtained. Patient                        identification and proposed procedure were verified by                        the nurse in the pre-procedure area. Mental Status                        Examination: alert and oriented. Airway Examination:                        Mallampati Class II (the uvula but not tonsillar pillars                        visualized). Respiratory Examination: clear to                        auscultation. CV Examination: normal. ASA Grade                        Assessment: III - A patient with severe systemic                        disease. After reviewing the risks and benefits, the                        patient was deemed in satisfactory condition to undergo                        the procedure. The anesthesia plan was to use general                        anesthesia. Immediately prior to administration of                        medications, the patient was re-assessed for adequacy to                        receive sedatives. The heart rate, respiratory rate,                        oxygen saturations, blood pressure, adequacy of                        pulmonary ventilation, and response to care were                        monitored throughout the procedure. The physical status                        of the patient was re-assessed after the procedure.                        After obtaining informed consent, the scope was passed                        under direct vision. The 1T gastroscope was introduced                        through the anus and advanced to the descending colon.                        After obtaining informed consent, the scope was passed                      "   under direct vision.The flexible sigmoidoscopy was                        accomplished without difficulty. The patient tolerated                        the procedure well. The quality of the bowel preparation                        was adequate.                                                                                     Findings:        The patient was supine in frog leg position using fluoroscopy for        adjunct imaging throughout. Digital exam demonstrated a tight sphinter        and a vault full of white liquid. The 1T gastroscope was then inserted        into the rectum where the distal ends of two colonic uncovereed metal        stents were found bridging the rectosigmoid transition in stent in stent        fashion. These were entered and the lumen progressively narrowed        upstream due to tumor in growth. The proximal ends of the stents were        found at a tight stenosis and partially against a wall. Taking care to        ensure passage of a wire though the lumen of the stents and not through        interstices a long 0.025\" Visiglide wire was knuckled into the the        upstream lumen, which by fluoroscopy appeared dilated. Over this wire a        12mm occlusion balloon and Omnitome were passed to inject contrast and        to further ensure appropriate wire placement. Over the wire a 25 mm x 12        cm uncovered metal WallFlex stent was positioned with 50% upstream of        the proximal ends of the in situ stents. Position was excellent and        drainage of white liquid contents was impressive.                                                                                     Impression:          - Mostly stable position of two full expanded in situ                        colonic stents, now found to have both progressive tumor                        ingrowth and their proximal ends partially obstructed by                        a turn of the sigmoid                        - " Successful deployment of a third uncovered metal                        colonic stent with excellent position and drainage                        immediately achieved   Recommendation:      - Standard inpatient general anesthesia recovery with                        return to the floor when appropriate                        - No interval endoscopy planned at this juncture                        - Given ostomy, oral intake may resume as previous when                        the patient desires                        - The findings and recommendations were discussed with                        the patient and their family                                                                                       electronically signed by ISA Rios

## 2018-07-05 NOTE — PLAN OF CARE
Problem: Patient Care Overview  Goal: Plan of Care/Patient Progress Review  Outcome: No Change    Tmax 100, OVSS. Continues with abdominal pain. PRN IV Dilaudid 0.5 mg given x4 and PRN PO Oxycodone 10 mg. Patient stated nausea goes with pain and once pain is under control so is the nausea. Patient denied any interventions for nausea. Fentanyl patch in place on left deltoid. Voiding spontaneously but not saving urine. Ileostomy intact with little output. Recheck of K+ was 3.6 and no additional interventions were needed. Cycled TPN infusing. BG was 165 for parenteral nutrition monitoring . Slept between cares. Plan for patient to get a flexible sigmoidoscopy either today or tomorrow. Continue with POC.

## 2018-07-05 NOTE — PROGRESS NOTES
[facility], [place]    Hematology / Oncology Progress Note    Date of Admission: 7/3/2018  Hospital Day #: 2   Date of Service (when I saw the patient): 07/05/2018     Assessment & Plan   Sebas Lopez is a 53 year old male with PMHx significant for ulcerative colitis, s/p ileostomy 7/2017, and sigmoid adenocarcinoma with peritoneal carcinomatosis s/p colonic stent 3/19 and recent chemotherapy with FOLFOX Cycle #13 6/15/18 who presents with increased abdominal pain and abdominal bloating.       #Increased abdominal pain.   #Increased abdominal bloating.   Patient notes approximately 3 weeks of above symptoms. States this started right after last cycle of chemotherapy given around 6/15/18. Usually resolves after a few days, although this time it did not. Pain has worsened over the course of these weeks, almost unmanageable on current PO regimen. Unable to take much in by mouth due to pain, nausea, and decreased appetite. Denies emesis. Denies change in amount, frequency or consistency of ileostomy output. Endorses maroon colored stool when passes a BM per rectum which is not new.   -CT AP in ED with increased fluid dilation of colon upstream of rectosigmoid stent, colonic wall draped over proximal orifice of stent leading to obstruction, persistent diffuse colonic wall thickening 2/2 ongoing colitis, no significant change in peritoneal carcinomatosis since last imaging.   -GI consulted: took for flex sig this morning (after tap water enemas) with stent placement. Appears to have some tumor ingrowth of both existing stents, now placement of a third. OK for diet as tolerated per GI team. Would like quantification of ileostomy and rectal stool output.   -CRS consulted, appreciate recs. No surgical intervention offered.   -Continue PTA oxycodone 5-10 mg q4hrs prn, IV dilaudid 0.5 mg q1hrs prn for breakthrough.   -NPO for procedure, initiate FLD with advancement to RADT as tolerated.  -C  diff from rectal output was NEGATIVE.      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    Follows with Dr. Long outpatient. S/p ex lap 7/2017 with small bowel resection and end ileostomy. S/p 6 cycles of FOLFOX. Admitted on 12/6/2017 for ex-lap with aborted HIPEC due to diffuse carcinomatosis. Repeat CT scan showed worsening peritoneal carcinomatosis. He was admitted to the hospital with worsening colon distention and colon stent was placed which improved his symptoms. He resumed FOLFOX C7 3/8/18 f/b hospitalization for bowel obstruction and stent replacement 3/19/2018. Continued with FOLFOX, last cycle #13 6/15/18. Repeat CT 6/27 with slightly progressed peritoneal carcinomatosis, increased colonic and rectal fluid favoring colitis. Per Dr. Long's note 6/28 next treatment options would likely be irinotecan and panitumumab within the next 1-2 weeks.   -Will need follow up at discharge.      #Anemia. Hgb 10.1 on admission. Likely 2/2 chronic disease and chemotherapy.   -Transfuse for hgb <7.    #Fever. Overnight 7/5, Tmax 101.5.  #Leukocytosis. Suspicious for infection in setting of increased abdominal pain, obstructive symptoms and cancerous involvement of bowels. Prescribed PO antibiotics by Dr. Long 6/28 however patient states he only took a few doses then independently discontinued.   - Blood cultures x2 >> NTD, WBC trending down slowly.  -Start on Zosyn 4.5 mg q6 hrs for suspected abdominal source of infection.       #Hyponatremia, hypokalemia.  Likely 2/2 poor PO intake/hydration. Na 126 and K 3.1.  -IVF resuscitation to slowly correct sodium.  -replace lytes per protocol prn.   -Continues to improve slowly with hydration. Hyponatremia work up unrevealing (Osm 271, UOsm 644, NaUrine 33) & continue IVF at 125 cc/hr.  -Check sodium q 4 hours.       #Malnutrition.  -Continue PTA TPN support.      # Pain Assessment:  Current Pain Score 3/20/2018   Patient currently in pain? yes   Pain score  (0-10) -   Pain location Abdomen   Pain descriptors -   - Sebas is experiencing pain due to sigmoid adenocarcinoma with peritoneal carcinomatosis. Pain management was discussed and the plan was created in a collaborative fashion.  Sebas's response to the current recommendations: engaged  - Please see the plan for pain management as documented above      FEN  -IVF @125 ml/hr  -replace lytes per protocol prn  -NPO      Prophys  -VTE: SCDs, mechanical for now with pending procedure and h/o maroon stools.  -PUD: PTA omeprazole.  -Bowels: hold d/t obstructive symptoms.      Dispo: Pending improvement in acute symptoms, likely additional 1-2 day stay.       Margarita Dougherty PA-C  Hematology/Oncology  Pager #[phone]    Interval History   In OR most of the day, appears to be feeling well, states he is much better after procedure. Pain has already improved. Feeling hungry. Denies nausea/vomiting. Ileostomy output unchanged, liquid brown output. Will begin measuring for accurate assessment both ostomy and rectal output.     Physical Exam   Temp: 98.7  F (37.1  C) Temp src: Oral BP: 91/68 Pulse: 115 Heart Rate: 109 Resp: 17 SpO2: 96 % O2 Device: None (Room air) Oxygen Delivery: 6 LPM  Vitals:    07/03/18 1622 07/04/18 0900 07/05/18 0806   Weight: 69.4 kg (153 lb) 71.1 kg (156 lb 11.2 oz) 73.4 kg (161 lb 12.8 oz)     Vital Signs with Ranges  Temp:  [98.6  F (37  C)-101.5  F (38.6  C)] 98.7  F (37.1  C)  Pulse:  [115] 115  Heart Rate:  [100-111] 109  Resp:  [12-20] 17  BP: ()/(68-87) 91/68  SpO2:  [93 %-100 %] 96 %  I/O last 3 completed shifts:  In: 2532.08 [P.O.:75; I.V.:1943.75]  Out: 0     Constitutional: Pleasant thin male seen sitting up in bed, in NAD. Alert and interactive. Appears comfortable.  HEENT: NCAT, anicteric sclerae, conjunctiva clear. Moist mucous membranes. Capnography in place.  Respiratory: Non-labored breathing, good air exchange. Lungs are clear to auscultation bilaterally, without wheezing, crackles or  rhonchi. No cough noted.   Cardiovascular: Regular rate and rhythm with no murmur, rub or gallop.  GI: Normoactive BS. Abdomen is mildly distended, with mild tenderness to LLQ, otherwise much improved. Ileostomy on right side of abdomen with moderate amount of brown liquid output. Healed midline incision.  Skin: Warm and dry. No rashes or lesions on exposed surfaces.  Musculoskeletal: Extremities grossly normal. No tenderness or edema present.   Neurologic: A &O x3, speech normal, answering questions appropriately. Moves all extremities spontaneously. Grossly non-focal.  Neuropsychiatric: Mentation and affect normal/appropriate.  VAD: PICC is c/d/i with no erythema, drainage, or tenderness.    Medications   Current Facility-Administered Medications   Medication     acetaminophen (TYLENOL) tablet 650 mg     amylase-lipase-protease (CREON 36) 27037 units per capsule 72,000 Units     cyanocobalamin (vitamin  B-12) tablet 1,000 mcg     dextrose 10 % 1,000 mL infusion     dronabinol (MARINOL) capsule 5 mg     fentaNYL (DURAGESIC) 25 mcg/hr 72 hr patch 1 patch     fentaNYL (DURAGESIC) Patch in Place     [START ON 7/6/2018] fentaNYL (DURAGESIC) patch REMOVAL     flumazenil (ROMAZICON) injection 0.2 mg     HYDROmorphone (PF) (DILAUDID) injection 0.3-0.5 mg     lipids (INTRALIPID) 20 % infusion 250 mL     LORazepam (ATIVAN) tablet 0.5-1 mg    Or     LORazepam (ATIVAN) injection 0.5-1 mg     May continue current IV fluids if patient has IV fluids infusing.     Medication Instruction     multivitamin, therapeutic with minerals (THERA-VIT-M) tablet 1 tablet     naloxone (NARCAN) injection 0.1-0.4 mg     naloxone (NARCAN) injection 0.1-0.4 mg     omeprazole (priLOSEC) CR capsule 20 mg     ondansetron (ZOFRAN) injection 8 mg    Or     ondansetron (ZOFRAN-ODT) ODT tab 8 mg    Or     ondansetron (ZOFRAN) tablet 8 mg     opium tincture 6 mg/0.6 mL (1%) liquid 6 mg     oxyCODONE IR (ROXICODONE) tablet 5-10 mg     parenteral nutrition  - ADULT compounded formula CYCLE     parenteral nutrition - ADULT compounded formula CYCLE     piperacillin-tazobactam (ZOSYN) 4.5 g vial to attach to  mL bag     potassium chloride (KLOR-CON) Packet 20-40 mEq     potassium chloride 10 mEq in 100 mL intermittent infusion with 10 mg lidocaine     potassium chloride 10 mEq in 100 mL sterile water intermittent infusion (premix)     potassium chloride SA (K-DUR/KLOR-CON M) CR tablet 20-40 mEq     potassium phosphate 15 mmol in D5W 250 mL intermittent infusion     potassium phosphate 20 mmol in D5W 250 mL intermittent infusion     potassium phosphate 20 mmol in D5W 500 mL intermittent infusion     potassium phosphate 25 mmol in D5W 500 mL intermittent infusion     prochlorperazine (COMPAZINE) tablet 5-10 mg    Or     prochlorperazine (COMPAZINE) injection 5 mg     sodium chloride (PF) 0.9% PF flush 3 mL     sodium chloride 0.9% infusion       Data   CBC  Recent Labs  Lab 07/05/18  0630 07/04/18  0615 07/03/18  1704 07/03/18  1227   WBC 26.9* 27.3*  --  28.5*   RBC 3.00* 3.28*  --  3.61*   HGB 8.4* 9.1*  --  10.1*   HCT 25.0* 27.3*  --  29.5*   MCV 83 83  --  82   MCH 28.0 27.7  --  28.0   MCHC 33.6 33.3  --  34.2   RDW 16.4* 16.5*  --  16.2*   * 523* 505* 548*     CMP  Recent Labs  Lab 07/05/18  0630 07/04/18  2347 07/04/18  1330 07/04/18  0615 07/03/18  1704 07/03/18  1237 07/03/18  1227   *  --  128* 129*  --   --  126*   POTASSIUM 3.4 3.6  --  3.2*  --   --  3.1*   CHLORIDE 94  --   --  87*  --   --  81*   CO2 27  --   --  32  --   --  32   ANIONGAP 10  --   --  10  --   --  12   *  --   --  152*  --   --  127*   BUN 17  --   --  17  --   --  22   CR 0.76  --   --  0.91 0.94  --  1.03   GFRESTIMATED >90  --   --  87 84 70 75   GFRESTBLACK >90  --   --  >90 >90 84 >90   ANNAMARIE 7.9*  --   --  8.3*  --   --  9.0   MAG 2.3  --   --  2.3  --   --   --    PHOS 2.3*  --   --  3.0  --   --   --    PROTTOTAL  --   --   --   --   --   --  7.5   ALBUMIN   --   --   --   --   --   --  2.1*   BILITOTAL  --   --   --   --   --   --  0.2   ALKPHOS  --   --   --   --   --   --  134   AST  --   --   --   --   --   --  23   ALT  --   --   --   --   --   --  12     INRNo lab results found in last 7 days.    Results for orders placed or performed during the hospital encounter of 07/03/18   CT Abdomen Pelvis w Contrast    Narrative    EXAMINATION: CT ABDOMEN PELVIS W CONTRAST, 7/3/2018 1:11 PM    TECHNIQUE:  Helical CT images from the lung bases through the  symphysis pubis were obtained with IV contrast. Contrast dose:  iopamidol (ISOVUE-370) solution 93 mL       COMPARISON: 6/27/2018 CT chest/abdomen/pelvis    HISTORY: diffuse lower abdominal pain with bloating; s/p colon stents  placement for obstructing colon CA;     FINDINGS:    Abdomen and pelvis: Stable position of a rectosigmoid stent traversing  the large colonic mass with increased dilation of the upstream colon  with fluid. The colonic wall just proximal to the proximal end of the  stent appears to be draped over the proximal luminal orifice of the  stent. The rectal wall is diffusely mildly thickened. No free air,  portal venous gas or pneumatosis.    Stable surgical changes of distal small bowel resection with right  midabdominal end ileostomy.    No substantial change in extensive confluent peritoneal carcinomatosis  about the rectosigmoid colon mass extending superiorly along and  within the anterior abdominal wall deep and superficial to an  abdominal wall hernia repair mesh and through the patient's ileostomy  defect. Scattered additional peritoneal carcinomatosis deposits  throughout the upper abdomen.    Peritoneal implants along the hepatic margin/capsule. Otherwise, the  liver, gallbladder, spleen, pancreas, adrenal glands, and kidneys are  unremarkable. The major abdominal vessels are patent. Stable  prominent, nonenlarged periaortic retroperitoneal and iliac chain  lymph nodes.    Lung bases/lower chest:   Bilateral dependent subsegmental atelectasis.  Otherwise clear. Partially imaged PICC.    Bones and soft tissues: Unchanged scattered lucencies in the iliac  bones. Surgical changes of left total hip arthroplasty. Chronic  lateral left rib fracture deformities. No acute osseous abnormality.      Impression    IMPRESSION:  1. Increased fluid dilation of the colon upstream of a rectosigmoid  stent (traversing a colon mass). The colonic wall is draped over the  proximal orifice of the stent, likely impairing passage of luminal  contents into the stent with resultant obstruction.  2. Persistent diffuse colonic wall thickening, likely representing  ongoing colitis.  3. No significant change in extensive peritoneal carcinomatosis, as  described.    I have personally reviewed the examination and initial interpretation  and I agree with the findings.    WILLIAM OLIVIER MD   XR Surgery TALA G/T 5 Min Fluoro w Stills    Narrative    This exam was marked as non-reportable because it will not be read by a   radiologist or a [place] non-radiologist provider.

## 2018-07-05 NOTE — PLAN OF CARE
Problem: Patient Care Overview  Goal: Plan of Care/Patient Progress Review  Outcome: Improving  Sebas returned from OR about 14:30. He is hemodynamically stable, alert and oriented. He states that is abdominal pain is dramatically improved following intervention. He will need phosphorus infusion which has been requested from pharmacy.

## 2018-07-05 NOTE — PLAN OF CARE
Problem: Patient Care Overview  Goal: Plan of Care/Patient Progress Review  Outcome: Improving  Patient left for OR at 11:10. He has been NPO except for meds with sips of water last between 08-09. TWE done x2 with pink returns and few very small chunks of stool that were pink-tinged. Po wipes done by patient on call. Hydromorphone intravenous given for pain last about 11:00.

## 2018-07-05 NOTE — ANESTHESIA PREPROCEDURE EVALUATION
Sebas Lopez is a 52 year old male with a PMH of  Large bowel obstruction [K56.609];Peritoneal carcinomatosis* who is scheduled for Procedure(s):  colonic stent placement  - Wound Class: II-Clean Contaminated    NPO Status: Adequate.  > 6 hours solids, > 2 hours clear liquids.       Past Surgical History:   Procedure Laterality Date     COLONOSCOPY  2017     COLONOSCOPY N/A 11/30/2017    Procedure: COLONOSCOPY;  Colonoscopy;  Surgeon: Rob Dudley MD;  Location: [facility]     COLONOSCOPY N/A 2/6/2018    Procedure: COMBINED COLONOSCOPY, STENT PLACEMENT;  COMBINED COLONOSCOPY with Colonic Stent Placement;  Surgeon: Guru Lionel Mar MD;  Location: [facility] OR     COLONOSCOPY N/A 3/19/2018    Procedure: COMBINED COLONOSCOPY, STENT PLACEMENT;  flexible sigmoidoscopy with stent placement and dilation;  Surgeon: Alexis Rios MD;  Location: [facility] OR     GI SURGERY  07/10/2017    Extensive lysis of adhesions, small bowel resection with end ileostomy.      HERNIA REPAIR       INSERT PORT VASCULAR ACCESS Right 8/10/2017    Procedure: INSERT PORT VASCULAR ACCESS;  Single Lumen Right Chest Power Port;  Surgeon: Malena Andrew PA-C;  Location: [facility]     LAPAROSCOPY DIAGNOSTIC (GENERAL) N/A 12/6/2017    Procedure: LAPAROSCOPY DIAGNOSTIC (GENERAL);  Diagnostic Laparoscopy, Exploratory Laparotomy Anesthesia Block ;  Surgeon: Rob Dudley MD;  Location: [facility] OR     LAPAROTOMY EXPLORATORY N/A 12/6/2017    Procedure: LAPAROTOMY EXPLORATORY;;  Surgeon: Rob Dudley MD;  Location: [facility]     ORTHOPEDIC SURGERY Left     HIP ARTHROPLASTY     PICC INSERTION Right 02/23/2018    5Fr DL BioFlo PICC, 44cm (3cm external) in the R basilic vein w/ tip in the SVC RA junction       Anesthesia Evaluation     . Pt has had prior anesthetic.     No history of anesthetic complications          ROS/MED HX    ENT/Pulmonary:     (+)NADIA risk factors snores loudly, observed stopped breathing , . .    (-) asthma and COPD   Neurologic:  - neg neurologic ROS     Cardiovascular: Comment: H/O Lyme disease (1990's) requiring pacing x1 day -- resolved.    (+) ----. : . . . :. . Previous cardiac testing date:results:date: results:ECG reviewed date: results: date: results:          METS/Exercise Tolerance:  4 - Raking leaves, gardening   Hematologic:     (+) Anemia, -      Musculoskeletal:   (+) , , other musculoskeletal- H/o Qaky-Ecpqc-Yaegbks, s/p L total hip replacement      GI/Hepatic:     (+) GERD Asymptomatic on medication, Other GI/Hepatic H/o ulcerative colitis x ~20 years, recently dx'd stage 4 sigmoid adenocarcinoma with peritoneal carcinomatosis.  S/p 6 cycles of FOLFOX.         Renal/Genitourinary:         Endo:  - neg endo ROS       Psychiatric:     (+) psychiatric history anxiety      Infectious Disease:  - neg infectious disease ROS       Malignancy:   (+) Malignancy History of Other  Other CA Metastatic sigmoid adenomaCA Active status post Chemo and Surgery         Other:                     Physical Exam  Normal systems: cardiovascular, pulmonary and dental    Airway   Mallampati: I  TM distance: >3 FB  Neck ROM: full    Dental     Cardiovascular   Rhythm and rate: regular and normal      Pulmonary    breath sounds clear to auscultation                        Anesthesia Plan      History & Physical Review  History and physical reviewed and following examination; no interval change.    ASA Status:  3 .    NPO Status:  > 8 hours    Plan for General and ETT with Intravenous and Propofol induction. Maintenance will be Balanced.    PONV prophylaxis:  Ondansetron (or other 5HT-3) and Dexamethasone or Solumedrol       Postoperative Care  Postoperative pain management:  IV analgesics.      Consents  Anesthetic plan, risks, benefits and alternatives discussed with:  Patient..        53M with UC --> colonic adenocarcinoma with colonic stent in place here for stent exchange.  ASA 3.  Plan: GETA  Risks of  anesthesia discussed, including sore throat, PONV and risk of dental or lip trauma.    Ashley Rivera MD  Staff Anesthesiologist  Pager [phone]                  .

## 2018-07-05 NOTE — PROGRESS NOTES
"GASTROENTEROLOGY PROGRESS NOTE    ASSESSMENT:  53 year old male with a history of ulcerative colitis, stage IV stage IV adenocarcinoma sigmoid colon on FOLFOX palliative chemo, complicated by extensive peritoneal carcinomatosis status post 2 colonic stents sigmoid colon and diverting ileostomy who presented on 7/3/2018 with abdominal pain and abdominal bloating.  Patient noted to have impressive white count which should prompt infectious evaluation (though could be stress response) fever 7/4/18.  No source found (C.diff negative, BC negative).  Patient's description of pain better when having rectal output and review of imaging showing proximal and distal obstruction of the right colon raises concerntions in that portion are increasing pressure and causing pain.  Will attempt a third colonic stent 7/5/2018.     RECOMMENDATIONS:  -colonic stent today  -NPO  -trend CBC  -document ostomy and rectal output (quantity and character).  -f/u infectious work up    The patient was discussed and plan agreed upon with GI staff.    Eran Gibbs  [facility]  Gastroenterology Fellow  _______________________________________________________________  S: No acute events overnight.  Patient reports increased abdominal pain, L>R side.  Some nausea.  Reports firm brown/green ostomy output.  Decreased \"red brown\" rectal output.      O: 101.5 F overnight.  Blood pressure 121/82, pulse 115, temperature 98.9  F (37.2  C), temperature source Oral, resp. rate 20, height 1.753 m (5' 9\"), weight 71.1 kg (156 lb 11.2 oz), SpO2 98 %.    Gen:NAD  HEENT: MMM, no scleral icterus  CV: RRR, no murmurs or rubs  Lungs: CTAB  Abd: BS+, abdominal mass on abdomen, tender diffusely L>R.  No rebound.   Skin: no rashes   MS: WWP  Neuro: grossly intact      LABS:  BMP  Recent Labs  Lab 07/05/18  0630 07/04/18  2347 07/04/18  1330 07/04/18  0615 07/03/18  1704 07/03/18  1227   *  --  128* 129*  --  126*   POTASSIUM 3.4 3.6  --  " 3.2*  --  3.1*   CHLORIDE 94  --   --  87*  --  81*   ANNAMARIE 7.9*  --   --  8.3*  --  9.0   CO2 27  --   --  32  --  32   BUN 17  --   --  17  --  22   CR 0.76  --   --  0.91 0.94 1.03   *  --   --  152*  --  127*     CBC  Recent Labs  Lab 07/05/18  0630 07/04/18  0615 07/03/18  1704 07/03/18  1227   WBC 26.9* 27.3*  --  28.5*   RBC 3.00* 3.28*  --  3.61*   HGB 8.4* 9.1*  --  10.1*   HCT 25.0* 27.3*  --  29.5*   MCV 83 83  --  82   MCH 28.0 27.7  --  28.0   MCHC 33.6 33.3  --  34.2   RDW 16.4* 16.5*  --  16.2*   * 523* 505* 548*     INRNo lab results found in last 7 days.  LFTs  Recent Labs  Lab 07/03/18  1227   ALKPHOS 134   AST 23   ALT 12   BILITOTAL 0.2   PROTTOTAL 7.5   ALBUMIN 2.1*      PANC  Recent Labs  Lab 07/03/18  1227   LIPASE 91     C.diff negative  BC NGTD on 7/2  UA negative    Lactate 1.1    CT ab/pelvis:  IMPRESSION:  1. Increased fluid dilation of the colon upstream of a rectosigmoid  stent (traversing a colon mass). The colonic wall is draped over the  proximal orifice of the stent, likely impairing passage of luminal  contents into the stent with resultant obstruction.  2. Persistent diffuse colonic wall thickening, likely representing  ongoing colitis.  3. No significant change in extensive peritoneal carcinomatosis, as  described.

## 2018-07-05 NOTE — ANESTHESIA CARE TRANSFER NOTE
Patient: Sebas Lopez    Procedure(s):  flexible sigmoidoscopy with colonic stent placement  - Wound Class: II-Clean Contaminated    Diagnosis: colon cancer   Diagnosis Additional Information: No value filed.    Anesthesia Type:   General, ETT     Note:  Airway :Face Mask  Patient transferred to:PACU  Comments: VSS, report to RN Handoff Report: Identifed the Patient, Identified the Reponsible Provider, Reviewed the pertinent medical history, Discussed the surgical course, Reviewed Intra-OP anesthesia mangement and issues during anesthesia, Set expectations for post-procedure period and Allowed opportunity for questions and acknowledgement of understanding      Vitals: (Last set prior to Anesthesia Care Transfer)    CRNA VITALS  7/5/2018 1258 - 7/5/2018 1337      7/5/2018             NIBP: 101/82    Pulse: 111    Ht Rate: 111    SpO2: 100 %                Electronically Signed By: ALEXA Garner CRNA  July 5, 2018  1:37 PM

## 2018-07-05 NOTE — PLAN OF CARE
Problem: Patient Care Overview  Goal: Plan of Care/Patient Progress Review  Outcome: Therapy, progress towards functional goals is fair  Tmax 101.5. Tachy. OVSS. A&Ox4. C/o pain in mid-lower left abdomen. PRN dilaudid available hourly, oxy available q 4 hours. 72 hour fentanyl patch in place on left deltoid. NPO ex/ ice chips. TPN cycling. Pt has ileostomy that he cares for himself. IV potassium (3.2) replaced this evening. De-draw ordered for 0200. NS running at 75/hr for low sodium. Possible flexible sigmoidoscopy tomorrow.

## 2018-07-05 NOTE — ANESTHESIA POSTPROCEDURE EVALUATION
Patient: Sebas Lopez    Procedure(s):  flexible sigmoidoscopy with colonic stent placement  - Wound Class: II-Clean Contaminated    Diagnosis:colon cancer   Diagnosis Additional Information: No value filed.    Anesthesia Type:  General, ETT    Note:  Anesthesia Post Evaluation    Patient location during evaluation: PACU  Patient participation: Able to fully participate in evaluation  Level of consciousness: awake and alert  Pain management: adequate  Airway patency: patent  Cardiovascular status: acceptable  Respiratory status: acceptable  Hydration status: acceptable  PONV: none     Anesthetic complications: None          Last vitals:  Vitals:    07/05/18 1400 07/05/18 1415 07/05/18 1421   BP: 109/74 103/72    Pulse:      Resp: 14 14    Temp:      SpO2: 97% 95% 96%         Electronically Signed By: Ashley Rivera MD  July 5, 2018  2:24 PM

## 2018-07-06 NOTE — PROGRESS NOTES
Care Coordinator Progress Note    Admission Date/Time:  7/3/2018  Attending MD:  Denise Erwin,*    Data  Chart reviewed, discussed with interdisciplinary team.   Patient was admitted for:    Colonic obstruction  Hyponatremia  Hypokalemia  Cancer of sigmoid colon (H).    Concerns with insurance coverage for discharge needs: None.  Current Living Situation: Patient lives with family.  Support System: Supportive and Involved  Services Involved: [facility] providing home TPN; [facility] (ph: [phone]) providing home RN visits  Transportation at Discharge: Family or friend will provide  Transportation to Medical Appointments:   - Not applicable  Barriers to Discharge: none anticipated     Assessment  Patient has hx of colon cancer s/p ileostomy in 2017 and colonic stenting in March 2018 and recent chemo 6/15. Admitted with increased abdominal pain and bloating. No s/p third colonic stent 7/5/18. Per Heme/Onc team, patient is stable for discharge home today with resumption of home TPN.  Patient has no questions or concerns regarding d/c plan at this time.    Coordination of Care and Referrals:  Patient would like to resume home TPN through [facility], with home RN visits contracted through [facility]. Both agencies updated on anticipated d/c plan.     Plan  Anticipated Discharge Date:  Possibly over weekend pending pain control.   Anticipated Discharge Plan:  home    Karime DURHAM Heme/Onc RN Care Coordinator  Pager [phone]

## 2018-07-06 NOTE — PROGRESS NOTES
Brief GI note     Stent placed. Output improved and improved pain.  GI will sign off. No follow up required. Please call if questions.    Eran Gibbs MD

## 2018-07-06 NOTE — DISCHARGE INSTRUCTIONS
__________________________________________________________  IF PATIENT DISCHARGES OVER WEEKEND:  Please call this home care agency below to notify of discharge and fax AVS  [facility]  Weekend Phone  [phone]  Fax  [phone]       ____________________________________________________________

## 2018-07-06 NOTE — PROGRESS NOTES
This is a recent snapshot of the patient's [facility] medical record.  For current drug dose and complete information and questions, call [phone] or In Basket pool, [facility] home infusion ([id number])  CSN Number:  [id number]

## 2018-07-06 NOTE — PLAN OF CARE
Problem: Patient Care Overview  Goal: Plan of Care/Patient Progress Review  Outcome: No Change    Occasionally tachycardic. Other VSS. Lactic acid 1.0. Hemoglobin 7.5; unit of blood infusing. Having stools through ileostomy and rectally; stools continue to be bloody. Alternating oxycodone and IV dilaudid for abdominal pain. Good appetite. Antibiotics changed to oral cipro and flagyl. On cycled TPN and lipids; blood sugars Q6. Possible discharge tomorrow. Continue with plan of care.

## 2018-07-06 NOTE — PLAN OF CARE
Problem: Patient Care Overview  Goal: Plan of Care/Patient Progress Review    Tmax 100.2. Tachy. OVSS. Pt returned from OR flex sig with stent placement and drainage at shift pain. Pt reports significant improvement in abd pain immediately after procedure. Oxycodone 10mg PO given x 1 for abd pain. Capno w/ sats > 93% on RA, IPI 9-10 and EtCO2 32. 420cc ileostomy output and 400cc red rectal stool this shift. Phos replaced. Recheck in am. Cycled TPN and lipids running via PICC. Pt up ind. Continue POC.

## 2018-07-06 NOTE — PROGRESS NOTES
[facility], [place]    Hematology / Oncology Progress Note    Date of Admission: 7/3/2018  Hospital Day #: 3   Date of Service (when I saw the patient): 07/06/2018     Assessment & Plan   Sebas Lopez is a 53 year old male with PMHx significant for ulcerative colitis, s/p ileostomy 7/2017, and sigmoid adenocarcinoma with peritoneal carcinomatosis s/p colonic stent 3/19 and recent chemotherapy with FOLFOX Cycle #13 6/15/18 who presents with increased abdominal pain and abdominal bloating.       #Increased abdominal pain.   #Increased abdominal bloating.   Patient notes approximately 3 weeks of above symptoms. States this started right after last cycle of chemotherapy given around 6/15/18. Usually resolves after a few days, although this time it did not. Pain has worsened over the course of these weeks, almost unmanageable on current PO regimen. Unable to take much in by mouth due to pain, nausea, and decreased appetite. Denies emesis. Denies change in amount, frequency or consistency of ileostomy output. Endorses maroon colored stool when passes a BM per rectum which is not new.   -CT AP in ED with increased fluid dilation of colon upstream of rectosigmoid stent, colonic wall draped over proximal orifice of stent leading to obstruction, persistent diffuse colonic wall thickening 2/2 ongoing colitis, no significant change in peritoneal carcinomatosis since last imaging.   -GI consulted: took for flex sig 7/5 (after tap water enemas) with stent placement. Appears to have some tumor ingrowth of both existing stents, now placement of a third. Tolerating regular diet. GI okay with ileostomy and rectal stool output, signed off. Patient continues to have maroon stools, hgb trending down some since admission.   -CRS consulted, appreciate recs. No surgical intervention offered.   -Continue PTA oxycodone 5-10 mg q4hrs prn, IV dilaudid 0.5 mg q1hrs prn for breakthrough.   -NPO for procedure,  initiate FLD with advancement to RADT as tolerated.  -C diff from rectal output was NEGATIVE.  -1 u PRBCs, consent signed.  -Continues to have some pain in abdomen but much improved since stent placed. Managed well on current regimen. Encouraged ambulation today.      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    Follows with Dr. Long outpatient. S/p ex lap 7/2017 with small bowel resection and end ileostomy. S/p 6 cycles of FOLFOX. Admitted on 12/6/2017 for ex-lap with aborted HIPEC due to diffuse carcinomatosis. Repeat CT scan showed worsening peritoneal carcinomatosis. He was admitted to the hospital with worsening colon distention and colon stent was placed which improved his symptoms. He resumed FOLFOX C7 3/8/18 f/b hospitalization for bowel obstruction and stent replacement 3/19/2018. Continued with FOLFOX, last cycle #13 6/15/18. Repeat CT 6/27 with slightly progressed peritoneal carcinomatosis, increased colonic and rectal fluid favoring colitis. Per Dr. Long's note 6/28 next treatment options would likely be irinotecan and panitumumab within the next 1-2 weeks.   -f/u scheduled 7/9 with Linda Alves PA-C.      #Anemia. Hgb 10.1 on admission. Likely 2/2 chronic disease and chemotherapy.   -Transfuse for hgb <7. Gave 1 u PRBCs for hg 7.5.    #Fever. Overnight 7/5, Tmax 101.5. Defervesced, now afebrile.  #Leukocytosis. Suspicious for infection in setting of increased abdominal pain, obstructive symptoms and cancerous involvement of bowels. Prescribed PO antibiotics by Dr. Long 6/28 however patient states he only took a few doses then independently discontinued.   - Blood cultures x2 >> NTD, WBC trending down slowly. WBC count 17.8 today.  -Start on Zosyn 4.5 mg q6 hrs for suspected abdominal source of infection--transitioned to Cipro 500 mg bid and Flagyl 500 mg tid to complete a 10 day course of abx.      #Hyponatremia, hypokalemia, Resolved. Likely 2/2 poor PO intake/hydration. Na 126  and K 3.1 on admission.  -IVF resuscitation to slowly correct sodium.  -replace lytes per protocol prn.   -Continues to improve slowly with hydration. Hyponatremia work up unrevealing (Osm 271, UOsm 644, NaUrine 33) & continue IVF at 125 cc/hr.      #Malnutrition.  -Continue PTA TPN support.      # Pain Assessment:  Current Pain Score 3/20/2018   Patient currently in pain? yes   Pain score (0-10) -   Pain location Abdomen   Pain descriptors -   - Sebas is experiencing pain due to sigmoid adenocarcinoma with peritoneal carcinomatosis. Pain management was discussed and the plan was created in a collaborative fashion.  Sebas's response to the current recommendations: engaged  - Please see the plan for pain management as documented above      FEN  -IVF @ 75 ml/hr  -replace lytes per protocol prn  -RADT      Prophys  -VTE: SCDs, mechanical with h/o maroon stools.  -PUD: PTA omeprazole.  -Bowels: prn       Dispo: Pending improvement in acute symptoms, likely discharge tomorrow, 7/7.      Margarita Dougherty PA-C  Hematology/Oncology  Pager #[phone]    Interval History   Feeling well today. Finishing breakfast when evaluated, tolerating without issue. No nausea vomiting. Afebrile. Notes some continued abdominal pain, but much better since stent placement. Continues to endorse maroon stools from rectum. Dark watery output from ileostomy, unchanged from usual per patient. Denies chest pain/SOB. Passing gas into ileostomy.    Physical Exam   Temp: 97.8  F (36.6  C) Temp src: Oral BP: 126/81 Pulse: 98 Heart Rate: 105 Resp: 18 SpO2: 99 % O2 Device: None (Room air)    Vitals:    07/04/18 0900 07/05/18 0806 07/06/18 0858   Weight: 71.1 kg (156 lb 11.2 oz) 73.4 kg (161 lb 12.8 oz) 72.4 kg (159 lb 9.6 oz)     Vital Signs with Ranges  Temp:  [97.2  F (36.2  C)-100.2  F (37.9  C)] 97.8  F (36.6  C)  Pulse:  [] 98  Heart Rate:  [103-113] 105  Resp:  [16-20] 18  BP: ()/(61-84) 126/81  SpO2:  [95 %-99 %] 99 %  I/O last 3 completed  shifts:  In: 6476.5 [P.O.:600; I.V.:3440]  Out: 3645 [Urine:1300; Stool:2345]    Constitutional: Pleasant thin male seen lying in bed, in NAD. Alert and interactive. Appears comfortable.  HEENT: NCAT, anicteric sclerae, conjunctiva clear. Moist mucous membranes.  Respiratory: Non-labored breathing, good air exchange. Lungs are clear to auscultation bilaterally, without wheezing, crackles or rhonchi. No cough noted.   Cardiovascular: Regular rate and rhythm with no murmur, rub or gallop.  GI: Normoactive BS. Abdomen is softer, non-distended, with mild tenderness to LLQ/LUQ and around ileostomy today. Ileostomy on right side of abdomen with moderate amount of dark brown liquid output. Healed midline incision.  Skin: Warm and dry. No rashes or lesions on exposed surfaces.  Musculoskeletal: Extremities grossly normal. No tenderness or edema present.   Neurologic: A &O x3, speech normal, answering questions appropriately. Moves all extremities spontaneously. Grossly non-focal.  Neuropsychiatric: Mentation and affect normal/appropriate.  VAD: PICC is c/d/i with no erythema, drainage, or tenderness.    Medications   Current Facility-Administered Medications   Medication     acetaminophen (TYLENOL) tablet 650 mg     amylase-lipase-protease (CREON 36) 59527 units per capsule 72,000 Units     ciprofloxacin (CIPRO) tablet 500 mg     cyanocobalamin (vitamin  B-12) tablet 1,000 mcg     dextrose 10 % 1,000 mL infusion     dronabinol (MARINOL) capsule 5 mg     fentaNYL (DURAGESIC) 25 mcg/hr 72 hr patch 1 patch     fentaNYL (DURAGESIC) Patch in Place     fentaNYL (DURAGESIC) patch REMOVAL     HYDROmorphone (PF) (DILAUDID) injection 0.3-0.5 mg     lipids (INTRALIPID) 20 % infusion 250 mL     LORazepam (ATIVAN) tablet 0.5-1 mg    Or     LORazepam (ATIVAN) injection 0.5-1 mg     May continue current IV fluids if patient has IV fluids infusing.     Medication Instruction     metroNIDAZOLE (FLAGYL) tablet 500 mg     multivitamin,  therapeutic with minerals (THERA-VIT-M) tablet 1 tablet     omeprazole (priLOSEC) CR capsule 20 mg     ondansetron (ZOFRAN) injection 8 mg    Or     ondansetron (ZOFRAN-ODT) ODT tab 8 mg    Or     ondansetron (ZOFRAN) tablet 8 mg     opium tincture 6 mg/0.6 mL (1%) liquid 6 mg     oxyCODONE IR (ROXICODONE) tablet 5-10 mg     parenteral nutrition - ADULT compounded formula CYCLE     parenteral nutrition - ADULT compounded formula CYCLE     potassium chloride (KLOR-CON) Packet 20-40 mEq     potassium chloride 10 mEq in 100 mL intermittent infusion with 10 mg lidocaine     potassium chloride 10 mEq in 100 mL sterile water intermittent infusion (premix)     potassium chloride SA (K-DUR/KLOR-CON M) CR tablet 20-40 mEq     potassium phosphate 15 mmol in D5W 250 mL intermittent infusion     potassium phosphate 20 mmol in D5W 250 mL intermittent infusion     potassium phosphate 20 mmol in D5W 500 mL intermittent infusion     potassium phosphate 25 mmol in D5W 500 mL intermittent infusion     prochlorperazine (COMPAZINE) tablet 5-10 mg    Or     prochlorperazine (COMPAZINE) injection 5 mg     sodium chloride (PF) 0.9% PF flush 3 mL     sodium chloride 0.9% infusion       Data   CBC    Recent Labs  Lab 07/06/18  0521 07/05/18  0630 07/04/18  0615 07/03/18  1704 07/03/18  1227   WBC 17.8* 26.9* 27.3*  --  28.5*   RBC 2.73* 3.00* 3.28*  --  3.61*   HGB 7.5* 8.4* 9.1*  --  10.1*   HCT 23.1* 25.0* 27.3*  --  29.5*   MCV 85 83 83  --  82   MCH 27.5 28.0 27.7  --  28.0   MCHC 32.5 33.6 33.3  --  34.2   RDW 16.5* 16.4* 16.5*  --  16.2*    469* 523* 505* 548*     CMP  Recent Labs  Lab 07/06/18  0521 07/05/18  0630 07/04/18  2347 07/04/18  1330 07/04/18  0615 07/03/18  1704  07/03/18  1227    132*  --  128* 129*  --   --  126*   POTASSIUM 3.5 3.4 3.6  --  3.2*  --   --  3.1*   CHLORIDE 106 94  --   --  87*  --   --  81*   CO2 24 27  --   --  32  --   --  32   ANIONGAP 9 10  --   --  10  --   --  12   * 167*  --    --  152*  --   --  127*   BUN 16 17  --   --  17  --   --  22   CR 0.82 0.76  --   --  0.91 0.94  --  1.03   GFRESTIMATED >90 >90  --   --  87 84  < > 75   GFRESTBLACK >90 >90  --   --  >90 >90  < > >90   ANNAMARIE 7.6* 7.9*  --   --  8.3*  --   --  9.0   MAG 2.2 2.3  --   --  2.3  --   --   --    PHOS 2.7 2.3*  --   --  3.0  --   --   --    PROTTOTAL  --   --   --   --   --   --   --  7.5   ALBUMIN  --   --   --   --   --   --   --  2.1*   BILITOTAL  --   --   --   --   --   --   --  0.2   ALKPHOS  --   --   --   --   --   --   --  134   AST  --   --   --   --   --   --   --  23   ALT  --   --   --   --   --   --   --  12   < > = values in this interval not displayed.  INR    Recent Labs  Lab 07/05/18  1727   INR 1.49*       Results for orders placed or performed during the hospital encounter of 07/03/18   CT Abdomen Pelvis w Contrast    Narrative    EXAMINATION: CT ABDOMEN PELVIS W CONTRAST, 7/3/2018 1:11 PM    TECHNIQUE:  Helical CT images from the lung bases through the  symphysis pubis were obtained with IV contrast. Contrast dose:  iopamidol (ISOVUE-370) solution 93 mL       COMPARISON: 6/27/2018 CT chest/abdomen/pelvis    HISTORY: diffuse lower abdominal pain with bloating; s/p colon stents  placement for obstructing colon CA;     FINDINGS:    Abdomen and pelvis: Stable position of a rectosigmoid stent traversing  the large colonic mass with increased dilation of the upstream colon  with fluid. The colonic wall just proximal to the proximal end of the  stent appears to be draped over the proximal luminal orifice of the  stent. The rectal wall is diffusely mildly thickened. No free air,  portal venous gas or pneumatosis.    Stable surgical changes of distal small bowel resection with right  midabdominal end ileostomy.    No substantial change in extensive confluent peritoneal carcinomatosis  about the rectosigmoid colon mass extending superiorly along and  within the anterior abdominal wall deep and superficial to  an  abdominal wall hernia repair mesh and through the patient's ileostomy  defect. Scattered additional peritoneal carcinomatosis deposits  throughout the upper abdomen.    Peritoneal implants along the hepatic margin/capsule. Otherwise, the  liver, gallbladder, spleen, pancreas, adrenal glands, and kidneys are  unremarkable. The major abdominal vessels are patent. Stable  prominent, nonenlarged periaortic retroperitoneal and iliac chain  lymph nodes.    Lung bases/lower chest:  Bilateral dependent subsegmental atelectasis.  Otherwise clear. Partially imaged PICC.    Bones and soft tissues: Unchanged scattered lucencies in the iliac  bones. Surgical changes of left total hip arthroplasty. Chronic  lateral left rib fracture deformities. No acute osseous abnormality.      Impression    IMPRESSION:  1. Increased fluid dilation of the colon upstream of a rectosigmoid  stent (traversing a colon mass). The colonic wall is draped over the  proximal orifice of the stent, likely impairing passage of luminal  contents into the stent with resultant obstruction.  2. Persistent diffuse colonic wall thickening, likely representing  ongoing colitis.  3. No significant change in extensive peritoneal carcinomatosis, as  described.    I have personally reviewed the examination and initial interpretation  and I agree with the findings.    WILLIAM OLIVIER MD   XR Surgery [facility]A G/T 5 Min Fluoro w Stills    Narrative    This exam was marked as non-reportable because it will not be read by a   radiologist or a [facility] non-radiologist provider.

## 2018-07-06 NOTE — PLAN OF CARE
Problem: Patient Care Overview  Goal: Plan of Care/Patient Progress Review  Alert and oriented X 4. AVSS. Denies SOB or nausea. C/o lower abdominal pain. Pt states pain has lessened since his colonic stent placement yesterday. Oxycodone and hydromorphone given with relief. Continues to have blood tinged ileostomy and rectal output. Had 400 mL output from ileostomy and 200 mL rectally. He also had one bowel incontinence episode. Sleeping in between cares.

## 2018-07-07 NOTE — PLAN OF CARE
Problem: Patient Care Overview  Goal: Plan of Care/Patient Progress Review  Outcome: Improving  VSS. Afebrile. C/o abdominal pain not controlled with current pain regimen. Oxycodone was increased to 10-20mg q4hrs PRN. Dilaudid IV available for breakthrough pain. Dilaudid 0.5mg given twice. Oxycodone 10mg and 20mg dose given once. Fentanyl patch in place on right arm. Pt reports dark red, loose stools from his rectum. MD is aware of this. Ileostomy output is loose, tan/brown. Pt independent with ileostomy cares. Writes output on the board. Pt's home routine at home is to take Zofran ODT q8hrs scheduled. One dose given for slight nausea. Needs encouragement to ambulate. Good appetite. NS infusing at 75ml/hr. Up ad juan. Possible d/c tomorrow if pain is well managed over night.

## 2018-07-07 NOTE — PROGRESS NOTES
[facility], [place]    Hematology / Oncology Progress Note    Date of Admission: 7/3/2018  Hospital Day #: 4   Date of Service (when I saw the patient): 07/07/2018     Assessment & Plan   Sebas Lopez is a 53 year old male with PMHx significant for ulcerative colitis, s/p ileostomy 7/2017, and sigmoid adenocarcinoma with peritoneal carcinomatosis s/p colonic stent 3/19 and recent chemotherapy with FOLFOX Cycle #13 6/15/18 who presents with increased abdominal pain and abdominal bloating.       #Increased abdominal pain.   #Increased abdominal bloating.   Patient notes approximately 3 weeks of above symptoms. States this started right after last cycle of chemotherapy given around 6/15/18. Usually resolves after a few days, although this time it did not. Pain has worsened over the course of these weeks, almost unmanageable on current PO regimen. Unable to take much in by mouth due to pain, nausea, and decreased appetite. Denies emesis. Denies change in amount, frequency or consistency of ileostomy output. Endorses maroon colored stool when passes a BM per rectum which is not new.   -CT AP in ED with increased fluid dilation of colon upstream of rectosigmoid stent, colonic wall draped over proximal orifice of stent leading to obstruction, persistent diffuse colonic wall thickening 2/2 ongoing colitis, no significant change in peritoneal carcinomatosis since last imaging.   -GI consulted: took for flex sig 7/5 (after tap water enemas) with stent placement. Appears to have some tumor ingrowth of both existing stents, now placement of a third. Tolerating regular diet. GI okay with ileostomy and rectal stool output, signed off. Patient continues to have maroon stools, hgb trending down some since admission. Transfused 1 unit 7/6. Monitor daily. Contact GI if profusely bleed/rapid drop in hgb.  -CRS consulted, appreciate recs. No surgical intervention offered.   -Continue PTA oxycodone  5-10 mg q4hrs prn-->increased to 10-20 mg q4hrs prn, IV dilaudid 0.5 mg q1hrs prn for breakthrough.   -NPO for procedure, initiate FLD with advancement to RADT as tolerated.  -C diff from rectal output was NEGATIVE.  -Transfuse for hgb <7. 1 u PRBCs 7/6, consent signed.  -Continues to have some pain in abdomen which is more than his baseline. Wary to discharge today so will monitor overnight. Increased PRN oxycodone to 10-20 mg q4hrs prn.      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    Follows with Dr. Long outpatient. S/p ex lap 7/2017 with small bowel resection and end ileostomy. S/p 6 cycles of FOLFOX. Admitted on 12/6/2017 for ex-lap with aborted HIPEC due to diffuse carcinomatosis. Repeat CT scan showed worsening peritoneal carcinomatosis. He was admitted to the hospital with worsening colon distention and colon stent was placed which improved his symptoms. He resumed FOLFOX C7 3/8/18 f/b hospitalization for bowel obstruction and stent replacement 3/19/2018. Continued with FOLFOX, last cycle #13 6/15/18. Repeat CT 6/27 with slightly progressed peritoneal carcinomatosis, increased colonic and rectal fluid favoring colitis. Per Dr. Long's note 6/28 next treatment options would likely be irinotecan and panitumumab within the next 1-2 weeks.   -f/u scheduled 7/9 with Linda Alves PA-C, requested push back to Wednesday.    #Anemia. Hgb 10.1 on admission. Likely 2/2 chronic disease and chemotherapy.     #Fever. Overnight 7/5, Tmax 101.5. Defervesced, now afebrile.  #Leukocytosis. Suspicious for infection in setting of increased abdominal pain, obstructive symptoms and cancerous involvement of bowels. Prescribed PO antibiotics by Dr. Long 6/28 however patient states he only took a few doses then independently discontinued.   - Blood cultures x2 >> NTD, WBC trending down slowly. WBC count improving.  -Start on Zosyn 4.5 mg q6 hrs for suspected abdominal source of infection--transitioned to  Cipro 500 mg bid and Flagyl 500 mg tid to complete a 10 day course of abx.      #Hyponatremia, hypokalemia, Resolved. Likely 2/2 poor PO intake/hydration. Na 126 and K 3.1 on admission.  -IVF resuscitation to slowly correct sodium.  -replace lytes per protocol prn.   -Continues to improve slowly with hydration. Hyponatremia work up unrevealing (Osm 271, UOsm 644, NaUrine 33) & continue IVF at 125 cc/hr.      #Malnutrition.  -Continue PTA TPN support.      # Pain Assessment:  Current Pain Score 3/20/2018   Patient currently in pain? yes   Pain score (0-10) -   Pain location Abdomen   Pain descriptors -   - Sebas is experiencing pain due to sigmoid adenocarcinoma with peritoneal carcinomatosis. Pain management was discussed and the plan was created in a collaborative fashion.  Sebas's response to the current recommendations: engaged  - Please see the plan for pain management as documented above      FEN  -IVF @ 75 ml/hr  -replace lytes per protocol prn  -RADT      Prophys  -VTE: SCDs, mechanical with h/o maroon stools.  -PUD: PTA omeprazole.  -Bowels: prn       Dispo: Pending improvement in acute symptoms, likely discharge tomorrow, 7/8.      Margarita Dougherty PA-C  Hematology/Oncology  Pager #[phone]    Interval History   Feeling okay today. Continues to have a decent amount of pain, notes sharp pain episodically. Maxint out prn oxy amount, plus using IV dilaudid prn. Will try increasing prn oxycodone to establish pain control. Tolerating PO intake. Endorses having maroon stools rectally, not more than the past week. Ostomy output stable per patient. No nausea/vomiting. Will monitor today.    Physical Exam   Temp: 98.3  F (36.8  C) Temp src: Oral BP: 118/84 Pulse: 99 Heart Rate: 90 Resp: 18 SpO2: 99 % O2 Device: None (Room air)    Vitals:    07/05/18 0806 07/06/18 0858 07/07/18 0700   Weight: 73.4 kg (161 lb 12.8 oz) 72.4 kg (159 lb 9.6 oz) 73.9 kg (162 lb 14.4 oz)     Vital Signs with Ranges  Temp:  [97.7  F (36.5   C)-98.6  F (37  C)] 98.3  F (36.8  C)  Pulse:  [99] 99  Heart Rate:  [] 90  Resp:  [16-18] 18  BP: (109-126)/(72-84) 118/84  SpO2:  [97 %-99 %] 99 %  I/O last 3 completed shifts:  In: 4851.67 [P.O.:1040; I.V.:1740]  Out: 4000 [Urine:1400; Stool:2600]    Constitutional: Pleasant thin male seen lying in bed, in NAD. Alert and interactive. Appears comfortable.  HEENT: NCAT, anicteric sclerae, conjunctiva clear. Moist mucous membranes.  Respiratory: Non-labored breathing, good air exchange. Lungs are clear to auscultation bilaterally, without wheezing, crackles or rhonchi. No cough noted.   Cardiovascular: Regular rate and rhythm with no murmur, rub or gallop.  GI: Normoactive BS. Abdomen is soft, non-distended, with with tenderness to palpation of LLQ/LUQ and around ileostomy. No rigidity or guarding, no rebound. Ileostomy on right side of abdomen with moderate amount of green liquid output. Healed midline incision.  Skin: Warm and dry. No rashes or lesions on exposed surfaces.  Musculoskeletal: Extremities grossly normal. No tenderness or edema present.   Neurologic: A &O x3, speech normal, answering questions appropriately. Moves all extremities spontaneously. Grossly non-focal.  Neuropsychiatric: Mentation and affect normal/appropriate.  VAD: PICC is c/d/i with no erythema, drainage, or tenderness.    Medications   Current Facility-Administered Medications   Medication     acetaminophen (TYLENOL) tablet 650 mg     amylase-lipase-protease (CREON 36) 39170 units per capsule 72,000 Units     ciprofloxacin (CIPRO) tablet 500 mg     cyanocobalamin (vitamin  B-12) tablet 1,000 mcg     dextrose 10 % 1,000 mL infusion     dronabinol (MARINOL) capsule 5 mg     fentaNYL (DURAGESIC) 25 mcg/hr 72 hr patch 1 patch     fentaNYL (DURAGESIC) Patch in Place     fentaNYL (DURAGESIC) patch REMOVAL     HYDROmorphone (PF) (DILAUDID) injection 0.3-0.5 mg     lipids (INTRALIPID) 20 % infusion 250 mL     LORazepam (ATIVAN) tablet  0.5-1 mg    Or     LORazepam (ATIVAN) injection 0.5-1 mg     May continue current IV fluids if patient has IV fluids infusing.     Medication Instruction     metroNIDAZOLE (FLAGYL) tablet 500 mg     multivitamin, therapeutic with minerals (THERA-VIT-M) tablet 1 tablet     omeprazole (priLOSEC) CR capsule 20 mg     ondansetron (ZOFRAN) injection 8 mg    Or     ondansetron (ZOFRAN-ODT) ODT tab 8 mg    Or     ondansetron (ZOFRAN) tablet 8 mg     opium tincture 6 mg/0.6 mL (1%) liquid 6 mg     oxyCODONE IR (ROXICODONE) tablet 10-20 mg     parenteral nutrition - ADULT compounded formula CYCLE     potassium chloride (KLOR-CON) Packet 20-40 mEq     potassium chloride 10 mEq in 100 mL intermittent infusion with 10 mg lidocaine     potassium chloride 10 mEq in 100 mL sterile water intermittent infusion (premix)     potassium chloride SA (K-DUR/KLOR-CON M) CR tablet 20-40 mEq     potassium phosphate 15 mmol in D5W 250 mL intermittent infusion     potassium phosphate 20 mmol in D5W 250 mL intermittent infusion     potassium phosphate 20 mmol in D5W 500 mL intermittent infusion     potassium phosphate 25 mmol in D5W 500 mL intermittent infusion     prochlorperazine (COMPAZINE) tablet 5-10 mg    Or     prochlorperazine (COMPAZINE) injection 5 mg     sodium chloride (PF) 0.9% PF flush 3 mL     sodium chloride 0.9% infusion       Data   CBC    Recent Labs  Lab 07/07/18  0559 07/06/18  0521 07/05/18  0630 07/04/18  0615   WBC 12.6* 17.8* 26.9* 27.3*   RBC 3.02* 2.73* 3.00* 3.28*   HGB 8.3* 7.5* 8.4* 9.1*   HCT 25.8* 23.1* 25.0* 27.3*   MCV 85 85 83 83   MCH 27.5 27.5 28.0 27.7   MCHC 32.2 32.5 33.6 33.3   RDW 16.5* 16.5* 16.4* 16.5*    424 469* 523*     CMP  Recent Labs  Lab 07/07/18  0559 07/06/18  0521 07/05/18  0630 07/04/18  2347 07/04/18  1330 07/04/18  0615  07/03/18  1227    139 132*  --  128* 129*  --  126*   POTASSIUM 4.0 3.5 3.4 3.6  --  3.2*  --  3.1*   CHLORIDE 110* 106 94  --   --  87*  --  81*   CO2 23 24  27  --   --  32  --  32   ANIONGAP 7 9 10  --   --  10  --  12   * 146* 167*  --   --  152*  --  127*   BUN 17 16 17  --   --  17  --  22   CR 0.70 0.82 0.76  --   --  0.91  < > 1.03   GFRESTIMATED >90 >90 >90  --   --  87  < > 75   GFRESTBLACK >90 >90 >90  --   --  >90  < > >90   ANNAMARIE 7.7* 7.6* 7.9*  --   --  8.3*  --  9.0   MAG  --  2.2 2.3  --   --  2.3  --   --    PHOS  --  2.7 2.3*  --   --  3.0  --   --    PROTTOTAL  --   --   --   --   --   --   --  7.5   ALBUMIN  --   --   --   --   --   --   --  2.1*   BILITOTAL  --   --   --   --   --   --   --  0.2   ALKPHOS  --   --   --   --   --   --   --  134   AST  --   --   --   --   --   --   --  23   ALT  --   --   --   --   --   --   --  12   < > = values in this interval not displayed.  INR    Recent Labs  Lab 07/05/18  1727   INR 1.49*       Results for orders placed or performed during the hospital encounter of 07/03/18   CT Abdomen Pelvis w Contrast    Narrative    EXAMINATION: CT ABDOMEN PELVIS W CONTRAST, 7/3/2018 1:11 PM    TECHNIQUE:  Helical CT images from the lung bases through the  symphysis pubis were obtained with IV contrast. Contrast dose:  iopamidol (ISOVUE-370) solution 93 mL       COMPARISON: 6/27/2018 CT chest/abdomen/pelvis    HISTORY: diffuse lower abdominal pain with bloating; s/p colon stents  placement for obstructing colon CA;     FINDINGS:    Abdomen and pelvis: Stable position of a rectosigmoid stent traversing  the large colonic mass with increased dilation of the upstream colon  with fluid. The colonic wall just proximal to the proximal end of the  stent appears to be draped over the proximal luminal orifice of the  stent. The rectal wall is diffusely mildly thickened. No free air,  portal venous gas or pneumatosis.    Stable surgical changes of distal small bowel resection with right  midabdominal end ileostomy.    No substantial change in extensive confluent peritoneal carcinomatosis  about the rectosigmoid colon mass  extending superiorly along and  within the anterior abdominal wall deep and superficial to an  abdominal wall hernia repair mesh and through the patient's ileostomy  defect. Scattered additional peritoneal carcinomatosis deposits  throughout the upper abdomen.    Peritoneal implants along the hepatic margin/capsule. Otherwise, the  liver, gallbladder, spleen, pancreas, adrenal glands, and kidneys are  unremarkable. The major abdominal vessels are patent. Stable  prominent, nonenlarged periaortic retroperitoneal and iliac chain  lymph nodes.    Lung bases/lower chest:  Bilateral dependent subsegmental atelectasis.  Otherwise clear. Partially imaged PICC.    Bones and soft tissues: Unchanged scattered lucencies in the iliac  bones. Surgical changes of left total hip arthroplasty. Chronic  lateral left rib fracture deformities. No acute osseous abnormality.      Impression    IMPRESSION:  1. Increased fluid dilation of the colon upstream of a rectosigmoid  stent (traversing a colon mass). The colonic wall is draped over the  proximal orifice of the stent, likely impairing passage of luminal  contents into the stent with resultant obstruction.  2. Persistent diffuse colonic wall thickening, likely representing  ongoing colitis.  3. No significant change in extensive peritoneal carcinomatosis, as  described.    I have personally reviewed the examination and initial interpretation  and I agree with the findings.    WILLIAM OLIVIER MD   XR Surgery [facility]A G/T 5 Min Fluoro w Stills    Narrative    This exam was marked as non-reportable because it will not be read by a   radiologist or a [place] non-radiologist provider.

## 2018-07-07 NOTE — PLAN OF CARE
Problem: Patient Care Overview  Goal: Plan of Care/Patient Progress Review    Tachy. OVSS. Oxycodone 10mg PO given x 2 and Dilaudid 0.5mg IV given x 3 w/ partial abd pain relief. 150cc rectal output and 775cc ileostomy output. TPN cycled. RBC's infused w/o problems. Possible D/C tomorrow. Continue POC.

## 2018-07-07 NOTE — PLAN OF CARE
Problem: Patient Care Overview  Goal: Plan of Care/Patient Progress Review  Alert and oriented X 4. AVSS. Denies SOB or nausea. C/o constant lower abdominal pain as well as episodic sharp, stabbing pain in left abdomen. Oxycodone and hydromorphone administered with relief. TPN infusing. Tolerating well. Independent with ambulation and toileting. Continues to report blood-tinged output from ostomy and rectum. Slept in between cares.

## 2018-07-08 NOTE — PLAN OF CARE
Nursing Focus: Discharge    D: Patient discharged to home at 1300. Patient stable and accompanied by SO.    I: Discharge prescriptions sent to discharge pharmacy to be filled. All discharge medications and instructions reviewed with Sebas. Patient instructed to call clinic triage nurse if he experiences a fever >100.4, uncontrolled nausea, vomiting, diarrhea, or pain; or experiences any signs or symptoms of bleeding. Other phone numbers to call with questions or concerns after discharge reviewed. PICC saline locked. Education completed.    A: Brain verbalized understanding of discharge medications and instructions. Patient will  medications at discharge pharmacy.     P: Patient to follow-up in clinic on 7/10 with ANNAMARIE Colón.

## 2018-07-08 NOTE — PLAN OF CARE
Problem: Patient Care Overview  Goal: Plan of Care/Patient Progress Review    Tmax 99.5. HR max 127 after ambulation. Denies SOB, palpitations or chest pain. Recheck 116-119. Sepsis triggered. LA 0.9. Continue to monitor. Oxycodone 20mg given q4hrs for pain and IV Dilaudid x 1 for breakthrough pain. Pain well managed this shift. Ambulated halls x 2. Measuring ileostomy and rectal output. Zofran given q8hrs for nausea prophylaxis. Cycled TPN and lipids running. Continue POC.

## 2018-07-08 NOTE — PLAN OF CARE
Problem: Patient Care Overview  Goal: Plan of Care/Patient Progress Review  Alert and oriented X 4. Tachycardic. Denies SOB, nausea, chest pain, palpitations or dizziness. C/o pain in left abdomen. Oxycodone and hydromorphone given with decrease in pain. Continues to have blood-tinged stool from ileostomy and rectum. He reports that this is normal for him after colonoscopies and lasts a few day. Ambulated to and from bathroom independently. Tolerated activity well. Sleeping in between cares.

## 2018-07-08 NOTE — DISCHARGE SUMMARY
[facility], [place]    Discharge Summary  Hematology / Oncology    Date of Admission:  7/3/2018  Date of Discharge:  7/8/2018  1:46 PM  Discharging Provider: Margarita Dougherty  Date of Service (when I saw the patient): 07/08/18    Discharge Diagnoses   Abdominal pain-improving  Abdominal distention/bloating-resolved  Stage IV sigmoid adenocarcinoma  Peritoneal carcinomatosis  Fever-resolved  Leukocytosis-improved  Hyponatremia-resolved  Hypokalemia-resolved    History of Present Illness   Sebas Lopez is a 53 year old male with PMHx significant for ulcerative colitis, s/p ileostomy 7/2017, and sigmoid adenocarcinoma with peritoneal carcinomatosis s/p colonic stent 3/19 and recent chemotherapy with FOLFOX Cycle #13 6/15/18 who presents with increased abdominal pain and abdominal bloating.     Hospital Course   Sebas Lopez was admitted on 7/3/2018.  The following problems were addressed during his hospitalization:    #Increased abdominal pain.   #Increased abdominal bloating.   Patient notes approximately 3 weeks of above symptoms. States this started right after last cycle of chemotherapy given around 6/15/18. Usually resolves after a few days, although this time it did not. Pain has worsened over the course of these weeks, almost unmanageable on current PO regimen. Unable to take much in by mouth due to pain, nausea, and decreased appetite. Denies emesis. Denies change in amount, frequency or consistency of ileostomy output. Endorses maroon colored stool when passes a BM per rectum which is not new.   -CT AP in ED with increased fluid dilation of colon upstream of rectosigmoid stent, colonic wall draped over proximal orifice of stent leading to obstruction, persistent diffuse colonic wall thickening 2/2 ongoing colitis, no significant change in peritoneal carcinomatosis since last imaging.   -GI consulted: took for flex sig 7/5 (after tap water enemas) with stent placement.  Appears to have some tumor ingrowth of both existing stents, now placement of a third. Tolerating regular diet. GI okay with ileostomy and rectal stool output, signed off. Patient continues to have maroon stools, hgb trending down some since admission. Transfused 1 unit 7/6, now stable. Patient states stools are lightening/becoming less maroon daily. Has had blood in stool post colonoscopy before per patient.  -CRS consulted, appreciate recs. No surgical intervention offered.   -Continue PTA oxycodone 5-10 mg q4hrs prn-->increased to 10-20 mg q4hrs prn, IV dilaudid 0.5 mg q1hrs prn for breakthrough. Discharged on oxycodone 15-25 mg q4hrs prn, however unable to fill 5 mg tabs d/t insurance coverage. Has enough 10 mg tabs for 3-5 days. Can split 10 mg tabs into 5 mg if needed for above dosing. Patient agreeable with this. Could consider increased dose of fentanyl patch, patient declined trying this inpatient/preferred increase in prn oxycodone.  -Tolerating regular diet at discharge.   -C diff from rectal output was NEGATIVE.  -Transfuse for hgb <7. 1 u PRBCs 7/6, consent signed.      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    Follows with Dr. Long outpatient. S/p ex lap 7/2017 with small bowel resection and end ileostomy. S/p 6 cycles of FOLFOX. Admitted on 12/6/2017 for ex-lap with aborted HIPEC due to diffuse carcinomatosis. Repeat CT scan showed worsening peritoneal carcinomatosis. He was admitted to the hospital with worsening colon distention and colon stent was placed which improved his symptoms. He resumed FOLFOX C7 3/8/18 f/b hospitalization for bowel obstruction and stent replacement 3/19/2018. Continued with FOLFOX, last cycle #13 6/15/18. Repeat CT 6/27 with slightly progressed peritoneal carcinomatosis, increased colonic and rectal fluid favoring colitis. Per Dr. Long's note 6/28 next treatment options would likely be irinotecan and panitumumab within the next 1-2 weeks.   -f/u  scheduled 7/10 with Chiquis Negro PA-C.    #Anemia. Hgb 10.1 on admission. Likely 2/2 chronic disease and chemotherapy. Trended down to 7.5 over the course of hospital stay, transfused 1 unit PRBCs, hgb check in low to mid 8's the past few days. Stable.     #Fever. Overnight 7/5, Tmax 101.5. Defervesced, now afebrile.  #Leukocytosis. Suspicious for infection in setting of increased abdominal pain, obstructive symptoms and cancerous involvement of bowels. Prescribed PO antibiotics by Dr. Long 6/28 however patient states he only took a few doses then independently discontinued.   - Blood cultures x2 >> NTD, WBC trending down slowly. WBC count much improved.  -Start on Zosyn 4.5 mg q6 hrs for suspected abdominal source of infection--transitioned to Cipro 500 mg bid and Flagyl 500 mg tid to complete a 10 day course of abx. Sent with Cipro script, patient had enough Flagyl from outpatient script to complete.      #Hyponatremia, hypokalemia, Resolved. Likely 2/2 poor PO intake/hydration. Na 126 and K 3.1 on admission. IVF resuscitation to slowly correct sodium. Hyponatremia work up unrevealing (Osm 271, UOsm 644, NaUrine 33).      #Malnutrition.  -Continue PTA TPN support.      # Discharge Pain Plan:   - During his hospitalization, Sebas experienced pain due to metastatic sigmoid adenocarcinoma with peritoneal carcinomatosis.  The pain plan for discharge was discussed with Sebas and the plan was created in a collaborative fashion.    - See above for pain management plan.    Prophys  -VTE: SCDs, mechanical with h/o maroon stools.  -PUD: PTA omeprazole.  -Bowels: prn     Code: Full    Patient and plan discussed with staff attending, Dr. Hardin.      Margarita Dougherty PA-C  Hematology/Oncology  Pager #[phone]     Significant Results and Procedures   See above for imaging results.  Flex Sigmoidoscopy w/ stent placement.    Pending Results   These results will be followed up by Chiquis Negro PA-C  Unresulted Labs Ordered in the  Past 30 Days of this Admission     Date and Time Order Name Status Description    7/4/2018 2207 Blood culture Preliminary     7/4/2018 2207 Blood culture Preliminary     7/3/2018 1620 Blood culture Preliminary     7/3/2018 1620 Blood culture Preliminary         Code Status   Full Code    Primary Care Physician   NGUYỄN EDGAR    Physical Exam   Temp: 98.7  F (37.1  C) Temp src: Oral BP: 118/82 Pulse: 107 Heart Rate: 120 (after activity) Resp: 18 SpO2: 98 % O2 Device: None (Room air)    Vitals:    07/06/18 0858 07/07/18 0700 07/08/18 0700   Weight: 72.4 kg (159 lb 9.6 oz) 73.9 kg (162 lb 14.4 oz) 74.7 kg (164 lb 9.6 oz)     Vital Signs with Ranges  Temp:  [97.9  F (36.6  C)-99.6  F (37.6  C)] 98.7  F (37.1  C)  Pulse:  [] 107  Heart Rate:  [101-120] 120  Resp:  [16-20] 18  BP: (107-123)/(77-87) 118/82  SpO2:  [97 %-98 %] 98 %  I/O last 3 completed shifts:  In: 4126.4 [P.O.:360; I.V.:1840]  Out: 4975 [Urine:1950; Stool:3025]    Constitutional: Pleasant thin male seen sitting up in bed, in NAD. Alert and interactive. Appears comfortable.  HEENT: NCAT, anicteric sclerae, conjunctiva clear. Moist mucous membranes.  Respiratory: Non-labored breathing, good air exchange. Lungs are clear to auscultation bilaterally, without wheezing, crackles or rhonchi. No cough noted.   Cardiovascular: Regular rate and rhythm with no murmur, rub or gallop.  GI: Normoactive BS. Abdomen is soft, non-distended, with only mild tenderness to palpation of LLQ/LUQ. No rigidity or guarding, no rebound. Ileostomy on right side of abdomen with mild amount of yellow liquid output. Healed midline incision.  Skin: Warm and dry. No rashes or lesions on exposed surfaces.  Musculoskeletal: Extremities grossly normal. No tenderness or edema present.   Neurologic: A &O x3, speech normal, answering questions appropriately. Moves all extremities spontaneously. Grossly non-focal.  Neuropsychiatric: Mentation and affect normal/appropriate.  VAD: PICC is  c/d/i with no erythema, drainage, or tenderness.    Discharge Disposition   Discharged to home  Condition at discharge: Stable    Consultations This Hospital Stay   MEDICATION HISTORY IP PHARMACY CONSULT  PHYSICAL THERAPY ADULT IP CONSULT  OCCUPATIONAL THERAPY ADULT IP CONSULT  PHARMACY/NUTRITION TO START AND MANAGE TPN  PHARMACY IP CONSULT    Discharge Orders     CBC with platelets differential   Last Lab Result: Hemoglobin (g/dL)      Date                     Value                07/08/2018               8.3 (L)          ----------     Comprehensive metabolic panel     Home infusion referral     Reason for your hospital stay   Admitted with abdominal pain and distension and taken for a flex sigmoidoscopy and stent placement.     Follow Up and recommended labs and tests   Follow up as scheduled below:   -Requested apt with Chiquis Negro for 7/10. Cancelled 7/9 apt.     Activity   Your activity upon discharge: activity as tolerated     When to contact your care team   [facility] cancer clinic triage line at [phone] for temp >100.4, uncontrolled nausea/vomiting/diarrhea/constipation, unrelieved pain, bleeding not relieved with pressure, dizziness, chest pain, shortness of breath, loss of consciousness, and any new or concerning symptoms.     Discharge Instructions   --New oxycodone prescription: Take 15-25 mg every 4 hours as needed. You have 10 mg tabs at home, and I prescribed you some 5 mg tablets. You would take something like this: 15 mg dose (one 10 mg tab+ one 5 mg tab) or 25 mg dose (two 10 mg tabs+one 5 mg tab).   -Please complete entire course of antibiotics (Flagyl and Ciprofloxacin) as prescribed. It will be about 6 more days.     Full Code     Diet   Follow this diet upon discharge: Orders Placed This Encounter     Advance Diet as Tolerated: Regular Diet Adult       Discharge Medications   Discharge Medication List as of 7/8/2018 12:50 PM      START taking these medications    Details    ciprofloxacin (CIPRO) 500 MG tablet Take 1 tablet (500 mg) by mouth every 12 hours for 11 doses, Disp-11 tablet, R-0, E-Prescribe         CONTINUE these medications which have CHANGED    Details   metroNIDAZOLE (FLAGYL) 500 MG tablet Take 1 tablet (500 mg) by mouth 3 times daily, Disp-16 tablet, R-0, Historical      !! oxyCODONE IR (ROXICODONE) 10 MG tablet Take 15-25 mg every 4 hours as needed for moderate to severe pain (can pair 10 mg tabs with 5 mg tabs to make 15 or 25 mg dose)., Disp-90 tablet, R-0, Historical      !! oxyCODONE IR (ROXICODONE) 5 MG tablet Take 15-25 mg q4 hrs as needed for moderate to severe pain (can take with 10 mg tabs of previous prescription to make 15 mg or 25 mg doses)., Disp-20 tablet, R-0, Local Print       !! - Potential duplicate medications found. Please discuss with provider.      CONTINUE these medications which have NOT CHANGED    Details   amylase-lipase-protease (CREON) 85786 UNITS CPEP Take 2 capsules (72,000 Units) by mouth 3 times daily (with meals) And 1 capsule with each snack tid., Disp-240 capsule, R-3, E-PrescribeMail to patient if not yet due to be filled.      cyanocobalamin (VITAMIN  B-12) 1000 MCG tablet Take 1,000 mcg by mouth daily, Historical      dronabinol (MARINOL) 5 MG capsule Take 1 capsule (5 mg) by mouth 2 times daily (before meals), Disp-60 capsule, R-0, Local Print      fentaNYL (DURAGESIC) 25 mcg/hr 72 hr patch Place 1 patch onto the skin every 72 hours remove old patch., Disp-1 patch, R-0, Local Print      LORazepam (ATIVAN) 0.5 MG tablet Take 1 tablet (0.5 mg) by mouth every 4 hours as needed (Anxiety, Nausea/Vomiting or Sleep), Disp-30 tablet, R-5, Local Print      Multiple Vitamins-Minerals (MULTIVITAMIN & MINERAL PO) Take 1 tablet by mouth daily, Historical      NIACIN PO Take by mouth daily, Historical      omeprazole (PRILOSEC) 20 MG CR capsule Take 1 capsule (20 mg) by mouth daily, Disp-30 capsule, R-11, E-Prescribe      ondansetron  (ZOFRAN-ODT) 8 MG ODT tab Take 1 tablet (8 mg) by mouth every 8 hours as needed for nausea, Disp-60 tablet, R-5, E-Prescribe      opium tincture 10 MG/ML (1%) liquid Take 0.6 mLs (6 mg) by mouth 4 times daily, Disp-72 mL, R-0, Local Print      parenteral nutrition - PTA/DISCHARGE ORDER Inject into the vein daily [facility], Historical      sodium chloride 0.9% SOLN BOLUS Inject 1,000 mLs into the vein daily as needed For hydration., Disp-1000 mL, R-0, Historical      naloxone (NARCAN) nasal spray Spray 1 spray (4 mg) into one nostril alternating nostrils as needed for opioid reversal every 2-3 minutes until assistance arrives, Disp-0.2 mL, R-0, E-Prescribe         STOP taking these medications       levofloxacin (LEVAQUIN) 500 MG tablet Comments:   Reason for Stopping:             Allergies   Allergies   Allergen Reactions     Liquid Adhesive Rash     Dermabond, rash with pustules, occurred with abdominal surgery and port removal      Data   Most Recent 3 CBC's:  Recent Labs   Lab Test  07/08/18   0631  07/07/18   0559  07/06/18   0521   WBC  12.0*  12.6*  17.8*   HGB  8.3*  8.3*  7.5*   MCV  85  85  85   PLT  428  417  424      Most Recent 3 BMP's:  Recent Labs   Lab Test  07/08/18   0631  07/07/18   0559  07/06/18   0521   NA  136  139  139   POTASSIUM  4.7  4.0  3.5   CHLORIDE  108  110*  106   CO2  22  23  24   BUN  18  17  16   CR  0.74  0.70  0.82   ANIONGAP  6  7  9   ANNAMARIE  7.6*  7.7*  7.6*   GLC  122*  146*  146*     Most Recent 2 LFT's:  Recent Labs   Lab Test  07/03/18   1227  06/27/18   1040   AST  23  24   ALT  12  20   ALKPHOS  134  151*   BILITOTAL  0.2  0.4     Most Recent INR's and Anticoagulation Dosing History:  Anticoagulation Dose History     Recent Dosing and Labs Latest Ref Rng & Units 8/10/2017 1/26/2018 2/6/2018 2/7/2018 2/21/2018 3/18/2018 7/5/2018    INR 0.86 - 1.14 1.12 1.25(H) 2.31(H) 2.14(H) 1.27(H) 1.12 1.49(H)        Most Recent 3 Troponin's:No lab results found.  Most Recent Cholesterol  Panel:  Recent Labs   Lab Test  04/17/18   1000   TRIG  68     Most Recent 6 Bacteria Isolates From Any Culture (See EPIC Reports for Culture Details):  Recent Labs   Lab Test  07/04/18   2347  07/03/18   1714  07/03/18   1704  02/25/18   0603  02/25/18   0530  02/24/18   0644   CULT  No growth after 3 days  No growth after 3 days  No growth after 5 days  No growth after 5 days  No growth  No growth  No growth     Most Recent TSH, T4 and A1c Labs:No lab results found.  Results for orders placed or performed during the hospital encounter of 07/03/18   CT Abdomen Pelvis w Contrast    Narrative    EXAMINATION: CT ABDOMEN PELVIS W CONTRAST, 7/3/2018 1:11 PM    TECHNIQUE:  Helical CT images from the lung bases through the  symphysis pubis were obtained with IV contrast. Contrast dose:  iopamidol (ISOVUE-370) solution 93 mL       COMPARISON: 6/27/2018 CT chest/abdomen/pelvis    HISTORY: diffuse lower abdominal pain with bloating; s/p colon stents  placement for obstructing colon CA;     FINDINGS:    Abdomen and pelvis: Stable position of a rectosigmoid stent traversing  the large colonic mass with increased dilation of the upstream colon  with fluid. The colonic wall just proximal to the proximal end of the  stent appears to be draped over the proximal luminal orifice of the  stent. The rectal wall is diffusely mildly thickened. No free air,  portal venous gas or pneumatosis.    Stable surgical changes of distal small bowel resection with right  midabdominal end ileostomy.    No substantial change in extensive confluent peritoneal carcinomatosis  about the rectosigmoid colon mass extending superiorly along and  within the anterior abdominal wall deep and superficial to an  abdominal wall hernia repair mesh and through the patient's ileostomy  defect. Scattered additional peritoneal carcinomatosis deposits  throughout the upper abdomen.    Peritoneal implants along the hepatic margin/capsule. Otherwise, the  liver,  gallbladder, spleen, pancreas, adrenal glands, and kidneys are  unremarkable. The major abdominal vessels are patent. Stable  prominent, nonenlarged periaortic retroperitoneal and iliac chain  lymph nodes.    Lung bases/lower chest:  Bilateral dependent subsegmental atelectasis.  Otherwise clear. Partially imaged PICC.    Bones and soft tissues: Unchanged scattered lucencies in the iliac  bones. Surgical changes of left total hip arthroplasty. Chronic  lateral left rib fracture deformities. No acute osseous abnormality.      Impression    IMPRESSION:  1. Increased fluid dilation of the colon upstream of a rectosigmoid  stent (traversing a colon mass). The colonic wall is draped over the  proximal orifice of the stent, likely impairing passage of luminal  contents into the stent with resultant obstruction.  2. Persistent diffuse colonic wall thickening, likely representing  ongoing colitis.  3. No significant change in extensive peritoneal carcinomatosis, as  described.    I have personally reviewed the examination and initial interpretation  and I agree with the findings.    WILLIAM OLIVIER MD   XR Surgery [facility]ALA G/T 5 Min Fluoro w Stills    Narrative    This exam was marked as non-reportable because it will not be read by a   radiologist or a [place] non-radiologist provider.

## 2018-07-10 NOTE — MR AVS SNAPSHOT
After Visit Summary   7/10/2018    Sebas Lopez    MRN: [id number]           Patient Information     Date Of Birth          1965        Visit Information        Provider Department      7/10/2018 2:30 PM Deysi Lay PA-C [facility]        Today's Diagnoses     Colitis presumed infectious    -  1    Cancer of sigmoid colon (H)        Peritoneal carcinomatosis (H)        Cancer associated pain          Care Instructions    Call PCP if legs swelling becomes uneven, painful or red.     Restart Lomotil. Has some at home. Will call if need refill.            Follow-ups after your visit        Who to contact     If you have questions or need follow up information about today's clinic visit or your schedule please contact [facility] directly at [phone].  Normal or non-critical lab and imaging results will be communicated to you by [employer], letter or phone within 4 business days after the clinic has received the results. If you do not hear from us within 7 days, please contact the clinic through [employer] or phone. If you have a critical or abnormal lab result, we will notify you by phone as soon as possible.  Submit refill requests through [employer] or call your pharmacy and they will forward the refill request to us. Please allow 3 business days for your refill to be completed.          Additional Information About Your Visit        MyChart Information     [employer] gives you secure access to your electronic health record. If you see a primary care provider, you can also send messages to your care team and make appointments. If you have questions, please call your primary care clinic.  If you do not have a primary care provider, please call [phone] and they will assist you.        Care EveryWhere ID     This is your Care EveryWhere ID. This could be used by other organizations to access your [place] medical records  [id number]        Your Vitals  "Were     Pulse Temperature Respirations Height Pulse Oximetry BMI (Body Mass Index)    115 99.2  F (37.3  C) (Oral) 16 1.753 m (5' 9.02\") 99% 23.76 kg/m2       Blood Pressure from Last 3 Encounters:   07/10/18 112/71   07/08/18 118/82   06/28/18 112/80    Weight from Last 3 Encounters:   07/10/18 73 kg (161 lb)   07/08/18 74.7 kg (164 lb 9.6 oz)   06/28/18 69.2 kg (152 lb 9.6 oz)              Today, you had the following     No orders found for display         Where to get your medicines      Some of these will need a paper prescription and others can be bought over the counter.  Ask your nurse if you have questions.     Bring a paper prescription for each of these medications     oxyCODONE IR 10 MG tablet         Information about OPIOIDS     PRESCRIPTION OPIOIDS: WHAT YOU NEED TO KNOW   We gave you an opioid (narcotic) pain medicine. It is important to manage your pain, but opioids are not always the best choice. You should first try all the other options your care team gave you. Take this medicine for as short a time (and as few doses) as possible.     These medicines have risks:    DO NOT drive when on new or higher doses of pain medicine. These medicines can affect your alertness and reaction times, and you could be arrested for driving under the influence (DUI). If you need to use opioids long-term, talk to your care team about driving.    DO NOT operate heave machinery    DO NOT do any other dangerous activities while taking these medicines.     DO NOT drink any alcohol while taking these medicines.      If the opioid prescribed includes acetaminophen, DO NOT take with any other medicines that contain acetaminophen. Read all labels carefully. Look for the word  acetaminophen  or  Tylenol.  Ask your pharmacist if you have questions or are unsure.    You can get addicted to pain medicines, especially if you have a history of addiction (chemical, alcohol or substance dependence). Talk to your care team about " ways to reduce this risk.    Store your pills in a secure place, locked if possible. We will not replace any lost or stolen medicine. If you don t finish your medicine, please throw away (dispose) as directed by your pharmacist. The [place] Pollution Control Agency has more information about safe disposal: https://www.pca.[facility].mn.us/living-green/managing-unwanted-medications.     All opioids tend to cause constipation. Drink plenty of water and eat foods that have a lot of fiber, such as fruits, vegetables, prune juice, apple juice and high-fiber cereal. Take a laxative (Miralax, milk of magnesia, Colace, Senna) if you don t move your bowels at least every other day.          Primary Care Provider Office Phone # Fax #    Jaguar Becker [phone]       [place] [facility] [address]        Equal Access to Services     [facility]: Hadii karrie reddy hadasho Soomaali, waaxda luqadaha, qaybta kaalmada adeegyada, bhargav sage . So [facility] [phone].    ATENCIÓN: Si habla español, tiene a cid disposición servicios gratuitos de asistencia lingüística. Kenan al [phone].    We comply with applicable federal civil rights laws and [place] laws. We do not discriminate on the basis of race, color, national origin, age, disability, sex, sexual orientation, or gender identity.            Thank you!     Thank you for choosing [facility]  for your care. Our goal is always to provide you with excellent care. Hearing back from our patients is one way we can continue to improve our services. Please take a few minutes to complete the written survey that you may receive in the mail after your visit with us. Thank you!             Your Updated Medication List - Protect others around you: Learn how to safely use, store and throw away your medicines at www.disposemymeds.org.          This list is accurate as of 7/10/18  7:17 PM.  Always use your  most recent med list.                   Brand Name Dispense Instructions for use Diagnosis    amylase-lipase-protease 61956 units Cpep    CREON    240 capsule    Take 2 capsules (72,000 Units) by mouth 3 times daily (with meals) And 1 capsule with each snack tid.    Diarrhea, unspecified type, Peritoneal carcinomatosis (H), Cancer of sigmoid colon (H)       ciprofloxacin 500 MG tablet    CIPRO    11 tablet    Take 1 tablet (500 mg) by mouth every 12 hours for 11 doses    Colonic obstruction       cyanocobalamin 1000 MCG tablet    vitamin  B-12     Take 1,000 mcg by mouth daily        dronabinol 5 MG capsule    MARINOL    60 capsule    Take 1 capsule (5 mg) by mouth 2 times daily (before meals)    Peritoneal carcinomatosis (H), Anorexia       fentaNYL 25 mcg/hr 72 hr patch    DURAGESIC    1 patch    Place 1 patch onto the skin every 72 hours remove old patch.    Cancer of sigmoid colon (H)       FLAGYL 500 MG tablet   Generic drug:  metroNIDAZOLE     16 tablet    Take 1 tablet (500 mg) by mouth 3 times daily    Colitis       LORazepam 0.5 MG tablet    ATIVAN    30 tablet    Take 1 tablet (0.5 mg) by mouth every 4 hours as needed (Anxiety, Nausea/Vomiting or Sleep)    Peritoneal carcinomatosis (H), Cancer of sigmoid colon (H)       MULTIVITAMIN & MINERAL PO      Take 1 tablet by mouth daily        naloxone nasal spray    NARCAN    0.2 mL    Spray 1 spray (4 mg) into one nostril alternating nostrils as needed for opioid reversal every 2-3 minutes until assistance arrives    Cancer of sigmoid colon (H), Long term current use of opiate analgesic       NIACIN PO      Take by mouth daily        omeprazole 20 MG CR capsule    priLOSEC    30 capsule    Take 1 capsule (20 mg) by mouth daily    Diarrhea, unspecified type       ondansetron 8 MG ODT tab    ZOFRAN-ODT    60 tablet    Take 1 tablet (8 mg) by mouth every 8 hours as needed for nausea    Nausea       opium tincture 10 MG/ML (1%) liquid     72 mL    Take 0.6 mLs (6  mg) by mouth 4 times daily    Cancer of sigmoid colon (H), Diarrhea, unspecified type       * oxyCODONE IR 5 MG tablet    ROXICODONE    20 tablet    Take 15-25 mg q4 hrs as needed for moderate to severe pain (can take with 10 mg tabs of previous prescription to make 15 mg or 25 mg doses).    Cancer of sigmoid colon (H)       * oxyCODONE IR 10 MG tablet    ROXICODONE    90 tablet    Take 15-25 mg every 4 hours as needed for moderate to severe pain (can pair 10 mg tabs with 5 mg tabs to make 15 or 25 mg dose).    Cancer of sigmoid colon (H), Peritoneal carcinomatosis (H)       parenteral nutrition - PTA/DISCHARGE ORDER      Inject into the vein daily [facility]        sodium chloride 0.9% Soln BOLUS     1000 mL    Inject 1,000 mLs into the vein daily as needed For hydration.        * Notice:  This list has 2 medication(s) that are the same as other medications prescribed for you. Read the directions carefully, and ask your doctor or other care provider to review them with you.

## 2018-07-10 NOTE — PATIENT INSTRUCTIONS
Call PCP if legs swelling becomes uneven, painful or red.     Restart Lomotil. Has some at home. Will call if need refill.

## 2018-07-10 NOTE — PROGRESS NOTES
[facility]  MEDICAL ONCOLOGY PROGRESS NOTE  July 10, 2018    CHIEF COMPLAINT: Sigmoid adenocarcinoma with peritoneal carinomatosis. Discharged from hospital on 7/8    ONCOLOGY HPI:   Sebas is a 53-year-old male who has a history of ulcerative colitis diagnosed more than 20 years ago, but he has not had medical management for that.  He was doing well, but in 05/2017 he presented with a few weeks of abdominal pain.  At that time, his imaging showed that he had a sigmoid wall thickening with adjacent mesenteric lymphadenopathy and free fluid near the abdominal wall mesh from his prior hernia surgery.  He subsequently underwent a colonoscopy which was incomplete and showed an obstructing sigmoid colon mass.  The biopsy from the sigmoid mass showed sigmoid adenocarcinoma.  He then developed obstructive symptoms and underwent exploratory laparotomy on 07/10/2017.  During that he was found to have diffuse peritoneal carcinomatosis.  Extensive lysis of adhesions was performed and a small bowel resection was performed with end ileostomy.  The biopsies from the multiple large peritoneal metastasis also were positive for metastatic adenocarcinoma.       He started palliative FOLFOX on 8/11/17. C#6 given on 10/26/17  After 6 cycles, he has stable disease     On 12/6/17, he had Diagnostic laparoscopy and Exploratory laparotomy, but HIPEC was not performed as Peritoneal cancer index was 26 with diffuse involvement of the small bowel as well as the small bowel mesentery.      He then presented to ED on 1/26 with abdominal pain and associated nausea. CT A/P on 1/26 with 5.2 x 4.5 x 3.6 cm proximal sigmoid mass causnig colonic obstriction with singificant distention of cecum (7cm in diameter), ascending colon and proximal transverse colon. No pneumatosis or pneumoperitoneum. Also small volume of ascites with associated findings concerning for peritoneal carcinomatosis, increased from prior studies. Dr. Dudley was  consulted and recommended no surgical intervention.     He was then seen in clinic on 1/29/2018 as well as yesterday because for the last few days he was unable to tolerate solid foods and he was getting abdominal cramping and some nausea. He also noticed that his ostomy output decreased. He was given IV fluids as well as antibiotics and yesterday he was started on OxyContin 10 mg twice a day along with p.r.n. oxycodone. Of note a few weeks ago he was started on short acting octreotide to decrease the ostomy output but he stopped taking it about one week ago and few days prior to that he noticed worsening of the abdominal cramping.     Repeat CT scan showed worsening peritoneal carcinomatosis and he is getting more symptomatic in terms of worsening abdominal pain and difficulty eating because of worsening abdominal cramping. He was admitted to the hospital with worsening colon distention and colon stent was placed which improved his symptoms.      He was recently hospitalized from 2/20-2/25/18 with presumed infectious colitis and bacteremia. His port was removed. He was discharged home on IV vancomycin via a PICC line which he completed on 3/7/2018.      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 He was hospitalized with a bowel obstruction from 3/18-3/20/18 and a colonic stent was placed on 3/19/18.  C#8 3/22/18 FOLFOX  C#9 4/5/18 FOLFOX        Repeat CT shows slightly improved disease and less ascites now.   Because of progressive fatigue, we stopped the 5-FU bolus and leucovorin and continued with the 5-FU infusion and oxaliplatin.      C#10 5/4/18  C#11 5/18/18  C#13 5/31/18  C#13 6/15/18     Repeat CT scan showed worsening peritoneal disease. There is also increased fluid in the proximal control on as well as post stent fluid in the rectum.     INTERVAL HISTORY:   Sebas presents today for followup unacompanied after being discharged from the hospital on 7/8. He  was first admitted on 7/3 for abdominal pain and bloating. He was found to have obstruction from tumor into stents with increased fluid dilation of the sigmoid. He was discharged on oxycodone 15-25 mg q4hr and his fentanyl patch (25 mcg/hr) and cipro and flagyl until 7/14. He feel that his pain is much better under control. He had been taking his oxycodone on a schedule q4hr and last night he didn't feel like he needed it as often and took it 6 hours apart. He feels like this is improving. He has not had any nausea/vomitting. He feels that the nausea is worse when the pain is worse. He continues to have over a liter of stool output a day. He is on opium tincture though he does not know if this helps much. His rectal output is now clear without blood and is happening 3-4 times a day which is his baseline. He feels that his appetite is good and he is drinking 48-60 oz of fluid a day. He does not have trouble sleeping.    He notes that in the past couple days he has started to get edema in his legs. This is new for him. He feels that they are equally swollen. He denies any calf pain, redness or increased heat.     ROS: Denies HA, visual, hearing or taste changes, dry mouth, mouth sores, trouble swallowing, N/V, constipation, change in urination, chest pain, SOB, cough, numbness and tingling, night sweats, fever, chills, mood changes, bleeding      Allergies   Allergen Reactions     Liquid Adhesive Rash     Dermabond, rash with pustules, occurred with abdominal surgery and port removal        Current Outpatient Prescriptions   Medication     amylase-lipase-protease (CREON) 77403 UNITS CPEP     ciprofloxacin (CIPRO) 500 MG tablet     cyanocobalamin (VITAMIN  B-12) 1000 MCG tablet     dronabinol (MARINOL) 5 MG capsule     fentaNYL (DURAGESIC) 25 mcg/hr 72 hr patch     LORazepam (ATIVAN) 0.5 MG tablet     metroNIDAZOLE (FLAGYL) 500 MG tablet     Multiple Vitamins-Minerals (MULTIVITAMIN & MINERAL PO)     NIACIN PO      "omeprazole (PRILOSEC) 20 MG CR capsule     ondansetron (ZOFRAN-ODT) 8 MG ODT tab     opium tincture 10 MG/ML (1%) liquid     parenteral nutrition - PTA/DISCHARGE ORDER     sodium chloride 0.9% SOLN BOLUS     naloxone (NARCAN) nasal spray     oxyCODONE IR (ROXICODONE) 10 MG tablet     oxyCODONE IR (ROXICODONE) 5 MG tablet     No current facility-administered medications for this visit.        EXAM:  /71 (BP Location: Left arm, Patient Position: Chair, Cuff Size: Adult Regular)  Pulse 115  Temp 99.2  F (37.3  C) (Oral)  Resp 16  Ht 1.753 m (5' 9.02\")  Wt 73 kg (161 lb)  SpO2 99%  BMI 23.76 kg/m2  Wt Readings from Last 4 Encounters:   07/10/18 73 kg (161 lb)   07/08/18 74.7 kg (164 lb 9.6 oz)   06/28/18 69.2 kg (152 lb 9.6 oz)   06/27/18 70.5 kg (155 lb 8 oz)        General: No acute distress  HEENT: EOMI, PERRLA, no scleral icterus, mucus membranes moist and no lesions or thrush  Lymph: Neck is supple and shows no nodes in cervical or supraclavicular   Heart:  RRR, no murmur, gallop or rub  Lungs: CTA-B, no wheezes, rhonchi or rales  Abdomen: Normal bowel sounds, soft, non tender, not distended, no palpable masses  Extremities: 1+ pitting edema, worse on the right than left  Skin: No significant rashes or lesions  Neuro: CN II-XII are intact    LABS: [facility] [place], [place]    WHITE BLOOD COUNT  12.4 (H) 4.5 - 11.0 thou/cu mm   RED BLOOD COUNT  3.22 (L) 4.30 - 5.90 mil/cu mm   HEMOGLOBIN  8.9 (L) 13.5 - 17.5 g/dL   HEMATOCRIT  27.0 (L) 37.0 - 53.0 %   MCV  84 80 - 100 fL   MCH  27.6 26.0 - 34.0 pg   MCHC  33.0 32.0 - 36.0 g/dL   RDW  17.5 (H) 11.5 - 15.5 %   PLATELET COUNT  425 140 - 440 thou/cu mm   MPV  11.8 (H) 6.5 - 11.0 fL     NEUTROPHILS RELATIVE 64.0 %   LYMPHOCYTES RELATIVE 17.0 %   MONOCYTES RELATIVE 10.0 %   EOSINOPHILS RELATIVE 9.0 %   BASOPHILS RELATIVE 0.0 %   METAMYELOCYTES 0.0 <0.2 %   MYELOCYTES 0.0 <0.2 %   PROMYELOCYTES 0.0 <0.2 %   BLASTS 0.0 <0.2 %   OTHER CELLS 0.0 % "   PLASMA CELLS 0.0 %   NEUTROPHILS ABSOLUTE 7.9 (H) 1.7 - 7.0 thou/cu mm   LYMPHOCYTES ABSOLUTE 2.1 0.9 - 2.9 thou/cu mm   MONOCYTES ABSOLUTE 1.2 (H) <0.9 thou/cu mm   EOSINOPHILS ABSOLUTE 1.1 (H) <0.5 thou/cu mm   BASOPHILS ABSOLUTE 0.0 <0.3 thou/cu mm     POLYCHROMASIA Slight     RBC COMMENT Present (A) RBC morphology appears normal, RBC morphology within normal limits for newborns.   LARGE PLATELETS Present         TRIGLYCERIDES 82 <150 mg/dL     PHOSPHORUS 3.5 2.3 - 4.7 mg/dL     BILIRUBIN,TOTAL 0.3 0.2 - 1.2 mg/dL   BILIRUBIN,DIRECT 0.2 0.1 - 0.5 mg/dL   BILIRUBIN,INDIRECT  0.1 (L) 0.2 - 0.8 mg/dL     MAGNESIUM 1.7 1.6 - 2.6 mg/dL     SODIUM 135 135 - 145 mmol/L   POTASSIUM 4.8 3.5 - 5.0 mmol/L   CHLORIDE 104 98 - 110 mmol/L   CO2,TOTAL 24 21 - 31 mmol/L   ANION GAP 7 5 - 18    GLUCOSE 99 65 - 100 mg/dL   CALCIUM 9.2 8.5 - 10.5 mg/dL   BUN 22 8 - 25 mg/dL   CREATININE 0.81 0.72 - 1.25 mg/dL   BUN/CREAT RATIO  27 (H) 10 - 20    GFR if African American >60 >60 ml/min/1.73m2   GFR if not African American >60 >60 ml/min/1.73m2   ALBUMIN 2.8 (L) 3.5 - 5.2 g/dL   PROTEIN,TOTAL 7.4 6.0 - 8.0 g/dL   GLOBULIN  4.6 (H) 2.0 - 3.7 g/dL   A/G RATIO 0.6 (L) 1.0 - 2.0    BILIRUBIN,TOTAL 0.3 0.2 - 1.2 mg/dL   ALK PHOSPHATASE 103 50 - 136 IU/L   ALT (SGPT) 12 8 - 45 IU/L   AST (SGOT) 21 2 - 40 IU/L     IMAGING:   No new images today    IMPRESSION/PLAN:  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  The sigmoid carcinoma is obstructing, requiring exploratory laparotomy and end ileostomy along with small bowel resection.    MSI intact and NGS panel does not reveal any identifiable mutations. He was previously on 5-FU infusions and oxaliplatin, though CT showed worsening disease. Was due to switch therapy to irinotecan and panitumumab though was hospitalized on 7/3 for abdominal pain and bloating as well as rectal bleeding. Found to have colitis with increased fluid dilation of the colon. GI did a flex sig and  placed a colon stent for the third time. Other stents were found to have ingrowth of tumor.   -Started on Cipro and Flagyl for the colitis. Finish 10 day course  -Scheduled to start chemotherapy next week. We need to monitor his ostomy output closely on treatment.      Abdominal pain due to peritoneal carcinomatosis and colon obstruction.   -had his colon stented by GI for the third time. Rectal output is now clear without blood.   -Pain controlled with oxycodone 15-25 mg q4hr prn and Fentanyl 25 mcg/hr. Continue this regiment   -continue Cipro and Flagyl until 7/14  -will try and get him back in with Palliative Care to help manage pain and symptoms    Edema  -new onset edema bilaterally. On exam, right appears worse than left, though he states this morning they were equal and he elevates his left leg more. No pain or redness. Dicussed the risk of DVT. Recommended getting an U/S on RLE. Patient declined. Told him that if his leg became painful or the right leg continued to have more swelling that he should contact his PCP and get an U/S (he lives out of town).     FEN.   -Patient remains on 12 hours TPN/day and is eating as tolerated. His weight is almost back to baseline after being in the hospital.   -His appetite has been good lately. He is due to start chemo next week. Can take up to 10 mg BID of Marinol if he feels his appetite decreases.  -continue to drink lots of fluids        Hyponatremia. Resolved  Continue with 1L IV NS daily at home.  Discussed will continue with 1L IV NS daily at home when TPN taper, due to high ostomy output. Na normal today at 135     High output ostomy.   Will continue with tincture of opium qid and add Lomotil for additional support. Call if he needs a refill     History of iron deficient anemia.   Hemoglobin was low in the hospital 2/2 rectal bleeding and needed a transfusion on 7/6. His Hgb is trending upward now. He will continue on oral iron.    Renetta Rivera  [facility]  [address] [place] [id number]  [phone]    IDeysi PA-C attest that I saw this patient with the [employer] in training and was present for the key parts of the visit. I agree and have edited this note.     Deysi Lay PA-C

## 2018-07-10 NOTE — PROGRESS NOTES
This is a recent snapshot of the patient's [facility] medical record.  For current drug dose and complete information and questions, call [phone] or In Basket pool, [facility] ([id number])  CSN Number:  [id number]

## 2018-07-10 NOTE — LETTER
7/10/2018    RE: Sebas Lopez  [address] [place] [address]     [facility]  MEDICAL ONCOLOGY PROGRESS NOTE  July 10, 2018    CHIEF COMPLAINT: Sigmoid adenocarcinoma with peritoneal carinomatosis. Discharged from hospital on 7/8    ONCOLOGY HPI:   Sebas is a 53-year-old male who has a history of ulcerative colitis diagnosed more than 20 years ago, but he has not had medical management for that.  He was doing well, but in 05/2017 he presented with a few weeks of abdominal pain.  At that time, his imaging showed that he had a sigmoid wall thickening with adjacent mesenteric lymphadenopathy and free fluid near the abdominal wall mesh from his prior hernia surgery.  He subsequently underwent a colonoscopy which was incomplete and showed an obstructing sigmoid colon mass.  The biopsy from the sigmoid mass showed sigmoid adenocarcinoma.  He then developed obstructive symptoms and underwent exploratory laparotomy on 07/10/2017.  During that he was found to have diffuse peritoneal carcinomatosis.  Extensive lysis of adhesions was performed and a small bowel resection was performed with end ileostomy.  The biopsies from the multiple large peritoneal metastasis also were positive for metastatic adenocarcinoma.       He started palliative FOLFOX on 8/11/17. C#6 given on 10/26/17  After 6 cycles, he has stable disease     On 12/6/17, he had Diagnostic laparoscopy and Exploratory laparotomy, but HIPEC was not performed as Peritoneal cancer index was 26 with diffuse involvement of the small bowel as well as the small bowel mesentery.      He then presented to ED on 1/26 with abdominal pain and associated nausea. CT A/P on 1/26 with 5.2 x 4.5 x 3.6 cm proximal sigmoid mass causnig colonic obstriction with singificant distention of cecum (7cm in diameter), ascending colon and proximal transverse colon. No pneumatosis or pneumoperitoneum. Also small volume of ascites with associated findings concerning  for peritoneal carcinomatosis, increased from prior studies. Dr. Dudley was consulted and recommended no surgical intervention.     He was then seen in clinic on 1/29/2018 as well as yesterday because for the last few days he was unable to tolerate solid foods and he was getting abdominal cramping and some nausea. He also noticed that his ostomy output decreased. He was given IV fluids as well as antibiotics and yesterday he was started on OxyContin 10 mg twice a day along with p.r.n. oxycodone. Of note a few weeks ago he was started on short acting octreotide to decrease the ostomy output but he stopped taking it about one week ago and few days prior to that he noticed worsening of the abdominal cramping.     Repeat CT scan showed worsening peritoneal carcinomatosis and he is getting more symptomatic in terms of worsening abdominal pain and difficulty eating because of worsening abdominal cramping. He was admitted to the hospital with worsening colon distention and colon stent was placed which improved his symptoms.      He was recently hospitalized from 2/20-2/25/18 with presumed infectious colitis and bacteremia. His port was removed. He was discharged home on IV vancomycin via a PICC line which he completed on 3/7/2018.      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 He was hospitalized with a bowel obstruction from 3/18-3/20/18 and a colonic stent was placed on 3/19/18.  C#8 3/22/18 FOLFOX  C#9 4/5/18 FOLFOX        Repeat CT shows slightly improved disease and less ascites now.   Because of progressive fatigue, we stopped the 5-FU bolus and leucovorin and continued with the 5-FU infusion and oxaliplatin.      C#10 5/4/18  C#11 5/18/18  C#13 5/31/18  C#13 6/15/18     Repeat CT scan showed worsening peritoneal disease. There is also increased fluid in the proximal control on as well as post stent fluid in the rectum.     INTERVAL HISTORY:   Sebas presents today  for followup unacompanied after being discharged from the hospital on 7/8. He was first admitted on 7/3 for abdominal pain and bloating. He was found to have obstruction from tumor into stents with increased fluid dilation of the sigmoid. He was discharged on oxycodone 15-25 mg q4hr and his fentanyl patch (25 mcg/hr) and cipro and flagyl until 7/14. He feel that his pain is much better under control. He had been taking his oxycodone on a schedule q4hr and last night he didn't feel like he needed it as often and took it 6 hours apart. He feels like this is improving. He has not had any nausea/vomitting. He feels that the nausea is worse when the pain is worse. He continues to have over a liter of stool output a day. He is on opium tincture though he does not know if this helps much. His rectal output is now clear without blood and is happening 3-4 times a day which is his baseline. He feels that his appetite is good and he is drinking 48-60 oz of fluid a day. He does not have trouble sleeping.    He notes that in the past couple days he has started to get edema in his legs. This is new for him. He feels that they are equally swollen. He denies any calf pain, redness or increased heat.     ROS: Denies HA, visual, hearing or taste changes, dry mouth, mouth sores, trouble swallowing, N/V, constipation, change in urination, chest pain, SOB, cough, numbness and tingling, night sweats, fever, chills, mood changes, bleeding      Allergies   Allergen Reactions     Liquid Adhesive Rash     Dermabond, rash with pustules, occurred with abdominal surgery and port removal        Current Outpatient Prescriptions   Medication     amylase-lipase-protease (CREON) 74424 UNITS CPEP     ciprofloxacin (CIPRO) 500 MG tablet     cyanocobalamin (VITAMIN  B-12) 1000 MCG tablet     dronabinol (MARINOL) 5 MG capsule     fentaNYL (DURAGESIC) 25 mcg/hr 72 hr patch     LORazepam (ATIVAN) 0.5 MG tablet     metroNIDAZOLE (FLAGYL) 500 MG tablet      "Multiple Vitamins-Minerals (MULTIVITAMIN & MINERAL PO)     NIACIN PO     omeprazole (PRILOSEC) 20 MG CR capsule     ondansetron (ZOFRAN-ODT) 8 MG ODT tab     opium tincture 10 MG/ML (1%) liquid     parenteral nutrition - PTA/DISCHARGE ORDER     sodium chloride 0.9% SOLN BOLUS     naloxone (NARCAN) nasal spray     oxyCODONE IR (ROXICODONE) 10 MG tablet     oxyCODONE IR (ROXICODONE) 5 MG tablet     No current facility-administered medications for this visit.        EXAM:  /71 (BP Location: Left arm, Patient Position: Chair, Cuff Size: Adult Regular)  Pulse 115  Temp 99.2  F (37.3  C) (Oral)  Resp 16  Ht 1.753 m (5' 9.02\")  Wt 73 kg (161 lb)  SpO2 99%  BMI 23.76 kg/m2  Wt Readings from Last 4 Encounters:   07/10/18 73 kg (161 lb)   07/08/18 74.7 kg (164 lb 9.6 oz)   06/28/18 69.2 kg (152 lb 9.6 oz)   06/27/18 70.5 kg (155 lb 8 oz)        General: No acute distress  HEENT: EOMI, PERRLA, no scleral icterus, mucus membranes moist and no lesions or thrush  Lymph: Neck is supple and shows no nodes in cervical or supraclavicular   Heart:  RRR, no murmur, gallop or rub  Lungs: CTA-B, no wheezes, rhonchi or rales  Abdomen: Normal bowel sounds, soft, non tender, not distended, no palpable masses  Extremities: 1+ pitting edema, worse on the right than left  Skin: No significant rashes or lesions  Neuro: CN II-XII are intact    [facility]: [facility] [place], [place]    WHITE BLOOD COUNT  12.4 (H) 4.5 - 11.0 thou/cu mm   RED BLOOD COUNT  3.22 (L) 4.30 - 5.90 mil/cu mm   HEMOGLOBIN  8.9 (L) 13.5 - 17.5 g/dL   HEMATOCRIT  27.0 (L) 37.0 - 53.0 %   MCV  84 80 - 100 fL   MCH  27.6 26.0 - 34.0 pg   MCHC  33.0 32.0 - 36.0 g/dL   RDW  17.5 (H) 11.5 - 15.5 %   PLATELET COUNT  425 140 - 440 thou/cu mm   MPV  11.8 (H) 6.5 - 11.0 fL     NEUTROPHILS RELATIVE 64.0 %   LYMPHOCYTES RELATIVE 17.0 %   MONOCYTES RELATIVE 10.0 %   EOSINOPHILS RELATIVE 9.0 %   BASOPHILS RELATIVE 0.0 %   METAMYELOCYTES 0.0 <0.2 %   MYELOCYTES 0.0 <0.2 % "   PROMYELOCYTES 0.0 <0.2 %   BLASTS 0.0 <0.2 %   OTHER CELLS 0.0 %   PLASMA CELLS 0.0 %   NEUTROPHILS ABSOLUTE 7.9 (H) 1.7 - 7.0 thou/cu mm   LYMPHOCYTES ABSOLUTE 2.1 0.9 - 2.9 thou/cu mm   MONOCYTES ABSOLUTE 1.2 (H) <0.9 thou/cu mm   EOSINOPHILS ABSOLUTE 1.1 (H) <0.5 thou/cu mm   BASOPHILS ABSOLUTE 0.0 <0.3 thou/cu mm     POLYCHROMASIA Slight     RBC COMMENT Present (A) RBC morphology appears normal, RBC morphology within normal limits for newborns.   LARGE PLATELETS Present         TRIGLYCERIDES 82 <150 mg/dL     PHOSPHORUS 3.5 2.3 - 4.7 mg/dL     BILIRUBIN,TOTAL 0.3 0.2 - 1.2 mg/dL   BILIRUBIN,DIRECT 0.2 0.1 - 0.5 mg/dL   BILIRUBIN,INDIRECT  0.1 (L) 0.2 - 0.8 mg/dL     MAGNESIUM 1.7 1.6 - 2.6 mg/dL     SODIUM 135 135 - 145 mmol/L   POTASSIUM 4.8 3.5 - 5.0 mmol/L   CHLORIDE 104 98 - 110 mmol/L   CO2,TOTAL 24 21 - 31 mmol/L   ANION GAP 7 5 - 18    GLUCOSE 99 65 - 100 mg/dL   CALCIUM 9.2 8.5 - 10.5 mg/dL   BUN 22 8 - 25 mg/dL   CREATININE 0.81 0.72 - 1.25 mg/dL   BUN/CREAT RATIO  27 (H) 10 - 20    GFR if African American >60 >60 ml/min/1.73m2   GFR if not African American >60 >60 ml/min/1.73m2   ALBUMIN 2.8 (L) 3.5 - 5.2 g/dL   PROTEIN,TOTAL 7.4 6.0 - 8.0 g/dL   GLOBULIN  4.6 (H) 2.0 - 3.7 g/dL   A/G RATIO 0.6 (L) 1.0 - 2.0    BILIRUBIN,TOTAL 0.3 0.2 - 1.2 mg/dL   ALK PHOSPHATASE 103 50 - 136 IU/L   ALT (SGPT) 12 8 - 45 IU/L   AST (SGOT) 21 2 - 40 IU/L     IMAGING:   No new images today    IMPRESSION/PLAN:  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  The sigmoid carcinoma is obstructing, requiring exploratory laparotomy and end ileostomy along with small bowel resection.    MSI intact and NGS panel does not reveal any identifiable mutations. He was previously on 5-FU infusions and oxaliplatin, though CT showed worsening disease. Was due to switch therapy to irinotecan and panitumumab though was hospitalized on 7/3 for abdominal pain and bloating as well as rectal bleeding. Found to have colitis with  increased fluid dilation of the colon. GI did a flex sig and placed a colon stent for the third time. Other stents were found to have ingrowth of tumor.   -Started on Cipro and Flagyl for the colitis. Finish 10 day course  -Scheduled to start chemotherapy next week. We need to monitor his ostomy output closely on treatment.      Abdominal pain due to peritoneal carcinomatosis and colon obstruction.   -had his colon stented by GI for the third time. Rectal output is now clear without blood.   -Pain controlled with oxycodone 15-25 mg q4hr prn and Fentanyl 25 mcg/hr. Continue this regiment   -continue Cipro and Flagyl until 7/14  -will try and get him back in with Palliative Care to help manage pain and symptoms    Edema  -new onset edema bilaterally. On exam, right appears worse than left, though he states this morning they were equal and he elevates his left leg more. No pain or redness. Dicussed the risk of DVT. Recommended getting an U/S on RLE. Patient declined. Told him that if his leg became painful or the right leg continued to have more swelling that he should contact his PCP and get an U/S (he lives out of town).     FEN.   -Patient remains on 12 hours TPN/day and is eating as tolerated. His weight is almost back to baseline after being in the hospital.   -His appetite has been good lately. He is due to start chemo next week. Can take up to 10 mg BID of Marinol if he feels his appetite decreases.  -continue to drink lots of fluids        Hyponatremia. Resolved  Continue with 1L IV NS daily at home.  Discussed will continue with 1L IV NS daily at home when TPN taper, due to high ostomy output. Na normal today at 135     High output ostomy.   Will continue with tincture of opium qid and add Lomotil for additional support. Call if he needs a refill     History of iron deficient anemia.   Hemoglobin was low in the hospital 2/2 rectal bleeding and needed a transfusion on 7/6. His Hgb is trending upward now. He  will continue on oral iron.    Renetta Easton PA-C  [facility]  [address] [place] [address] [phone]

## 2018-07-10 NOTE — NURSING NOTE
"Oncology Rooming Note    July 10, 2018 2:45 PM   Sebas Lopez is a 53 year old male who presents for:    Chief Complaint   Patient presents with     Oncology Clinic Visit     return - colon      Initial Vitals: /71 (BP Location: Left arm, Patient Position: Chair, Cuff Size: Adult Regular)  Pulse 115  Temp 99.2  F (37.3  C) (Oral)  Resp 16  Ht 1.753 m (5' 9.02\")  Wt 73 kg (161 lb)  SpO2 99%  BMI 23.76 kg/m2 Estimated body mass index is 23.76 kg/(m^2) as calculated from the following:    Height as of this encounter: 1.753 m (5' 9.02\").    Weight as of this encounter: 73 kg (161 lb). Body surface area is 1.89 meters squared.  Mild Pain (3) Comment: Data Unavailable   No LMP for male patient.  Allergies reviewed: Yes  Medications reviewed: Yes    Medications: MEDICATION REFILLS NEEDED TODAY. Provider was notified.  Pharmacy name entered into [facility]: [facility] - [place] - [address]    Clinical concerns: no new concerns       minutes for nursing intake (face to face time)     Lynn Vega CMA            "

## 2018-07-11 NOTE — PROGRESS NOTES
This is a recent snapshot of the patient's [facility] medical record.  For current drug dose and complete information and questions, call [phone] or In Basket pool, [facility] home infusion ([id number])  CSN Number:  [id number]

## 2018-07-16 NOTE — TELEPHONE ENCOUNTER
Prior Authorization Retail Medication Request    Medication/Dose: FENTANYL 25 MCG PATCH  ICD code (if different than what is on RX):  Cancer of sigmoid colon (H) [C18.7]   Previously Tried and Failed:    Rationale:      Insurance Name:  [employer]  Insurance ID:  [id number]  BN: [id number]  PCN: [id number]    Pharmacy Information (if different than what is on RX)  Name:  [facility]  Phone:  [phone]

## 2018-07-19 NOTE — TELEPHONE ENCOUNTER
Prior Authorization Approval    Authorization Effective Date: 6/18/2018  Authorization Expiration Date: 7/18/2021  Medication: FENTANYL 25 MCG PATCH-PA approved  Approved Dose/Quantity:    Reference #: [id number]   Insurance Company: [employer] - Phone [phone] Fax [phone]  Expected CoPay:       CoPay Card Available:      Foundation Assistance Needed:    Which Pharmacy is filling the prescription (Not needed for infusion/clinic administered): [facility] - [place] - [address]  Pharmacy Notified: Yes  Patient Notified: No

## 2018-07-19 NOTE — PROGRESS NOTES
"RN Care Coordination Note  Scheduling reports that pt wants to cx infusion tomorrow, seeing Jacque and palliative care tomorrow.   Per Dr. Long - ok to cx infusion appt and reschedule when pt wants. Notified Jacque and infusion.  Spoke with pt to confirm above and that he will come see Jacque tomorrow. Was feeling \"off\" past few days and starting feel better and just does not feel he is up to doing more tx yet, no fevers temps 97-99.   Danuta Barajas, RN, BSN, OCN  Care Coordinator  [facility]      "

## 2018-07-20 PROBLEM — G89.3 NEOPLASM RELATED PAIN: Status: ACTIVE | Noted: 2018-01-01

## 2018-07-20 NOTE — LETTER
7/20/2018      RE: Sebas Lopez  [address] [place] [address]       [facility]  MEDICAL ONCOLOGY PROGRESS NOTE  July 20, 2018    CHIEF COMPLAINT: Sigmoid adenocarcinoma with peritoneal carcinomatosis.     ONCOLOGY HPI:   Sebas is a 53-year-old male who has a history of ulcerative colitis diagnosed more than 20 years ago, but he has not had medical management for that.  He was doing well, but in 05/2017 he presented with a few weeks of abdominal pain.  At that time, his imaging showed that he had a sigmoid wall thickening with adjacent mesenteric lymphadenopathy and free fluid near the abdominal wall mesh from his prior hernia surgery.  He subsequently underwent a colonoscopy which was incomplete and showed an obstructing sigmoid colon mass.  The biopsy from the sigmoid mass showed sigmoid adenocarcinoma.  He then developed obstructive symptoms and underwent exploratory laparotomy on 07/10/2017.  During that he was found to have diffuse peritoneal carcinomatosis.  Extensive lysis of adhesions was performed and a small bowel resection was performed with end ileostomy.  The biopsies from the multiple large peritoneal metastasis also were positive for metastatic adenocarcinoma.       He started palliative FOLFOX on 8/11/17. C#6 given on 10/26/17  After 6 cycles, he has stable disease     On 12/6/17, he had Diagnostic laparoscopy and Exploratory laparotomy, but HIPEC was not performed as Peritoneal cancer index was 26 with diffuse involvement of the small bowel as well as the small bowel mesentery.      He then presented to ED on 1/26 with abdominal pain and associated nausea. CT A/P on 1/26 with 5.2 x 4.5 x 3.6 cm proximal sigmoid mass causnig colonic obstriction with singificant distention of cecum (7cm in diameter), ascending colon and proximal transverse colon. No pneumatosis or pneumoperitoneum. Also small volume of ascites with associated findings concerning for peritoneal  carcinomatosis, increased from prior studies. Dr. Dudley was consulted and recommended no surgical intervention.     He was then seen in clinic on 1/29/2018 as well as yesterday because for the last few days he was unable to tolerate solid foods and he was getting abdominal cramping and some nausea. He also noticed that his ostomy output decreased. He was given IV fluids as well as antibiotics and yesterday he was started on OxyContin 10 mg twice a day along with p.r.n. oxycodone. Of note a few weeks ago he was started on short acting octreotide to decrease the ostomy output but he stopped taking it about one week ago and few days prior to that he noticed worsening of the abdominal cramping.     Repeat CT scan showed worsening peritoneal carcinomatosis and he is getting more symptomatic in terms of worsening abdominal pain and difficulty eating because of worsening abdominal cramping. He was admitted to the hospital with worsening colon distention and colon stent was placed which improved his symptoms.      He was recently hospitalized from 2/20-2/25/18 with presumed infectious colitis and bacteremia. His port was removed. He was discharged home on IV vancomycin via a PICC line which he completed on 3/7/2018.      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 He was hospitalized with a bowel obstruction from 3/18-3/20/18 and a colonic stent was placed on 3/19/18.  C#8 3/22/18 FOLFOX  C#9 4/5/18 FOLFOX        Repeat CT shows slightly improved disease and less ascites now.   Because of progressive fatigue, we stopped the 5-FU bolus and leucovorin and continued with the 5-FU infusion and oxaliplatin.      C#10 5/4/18  C#11 5/18/18  C#13 5/31/18  C#13 6/15/18     Repeat CT scan showed worsening peritoneal disease. There is also increased fluid in the proximal control on as well as post stent fluid in the rectum.     Plan was to switch therapy to irinotecan and panitumumab  "though was hospitalized on 7/3 for abdominal pain and bloating as well as rectal bleeding. Found to have colitis with increased fluid dilation of the colon. GI did a flex sig and placed a colon stent for the third time. Other stents were found to have ingrowth of tumor. He was discharged on oxycodone 15-25 mg q4hr and his fentanyl patch (25 mcg/hr) and cipro and flagyl until 7/14.     INTERVAL HISTORY:   About five days ago he started having more pain on center/left side of abdomen. He also thought he had more distention. He is on TPN 12h/day with additional 500mL boluses of fluid as needed. Because he was feeling more pain and having more distention, he skipped TPN one night on Monday or Tuesday. He was doing oxy 25 mg every 4 hours in addition to fentanyl 25mcg patch and that was controlling pain after hospital discharge. However, when pain became worse earlier this week, he stayed on 25 mg q4h although this was not adequate pain control. He also had more nausea. He is on scheduled Zofran and feels this reduces output and wonders if it caused more pain/distention. He did take a few lorazepam in addition. He vomited one time. He has compazine but has not been using. He started feeling better in past 2 days. Pain is \"mostly\" controlled with oxy 25 but he is now taking about every 6 hours. He took a dose at 6:30 this morning and has pain about a 4/10. Distended/bloated feeling is better. Nausea is less. Usually he has some oral intake up to 2300 calories daily. He had intended to try to wean off TPN and TPN amount had been reduced, but this week he had no appetite so did not eat aside from protein drinks about 1/day. He also has been doing about 20oz of gatorade/day and another 16-20 oz of soda or water daily. Still has no appetite. Overall he has less appetite and more nausea when he has pain. He has not been using marinol for appetite as he did not feel as though it had been working. He did increase to 10 mg prior " to meal and did not feel much response. He did feel more sleepy. He is doing Creon but not before every meal, about 3-4 capsules/day. He did have some low grade temps of .3 in past couple days.     Ostomy output has declined, he is unsure of amount. Rectal output is clear. He was using opium tincture, but he became concerned about the increased pain and distention and stopped taking it. He is having rectal output about 3-4 times daily and has not noticed increased amount or frequency.    Edema in legs much better. At baseline weight now. No neuropathy in hands/feet. No dizziness. Energy is ok.     He is concerned about the new therapy planned. He is concerned about myelosuppression in particular with irinotecan. He has less concerns about panitumumab aside from nail/skin infections. Remaining ROS negative      Allergies   Allergen Reactions     Liquid Adhesive Rash     Dermabond, rash with pustules, occurred with abdominal surgery and port removal        Current Outpatient Prescriptions   Medication     amylase-lipase-protease (CREON) 96970 UNITS CPEP     cyanocobalamin (VITAMIN  B-12) 1000 MCG tablet     dronabinol (MARINOL) 5 MG capsule     fentaNYL (DURAGESIC) 25 mcg/hr 72 hr patch     loperamide (IMODIUM) 2 MG capsule     LORazepam (ATIVAN) 0.5 MG tablet     LORazepam (ATIVAN) 0.5 MG tablet     metroNIDAZOLE (FLAGYL) 500 MG tablet     Multiple Vitamins-Minerals (MULTIVITAMIN & MINERAL PO)     naloxone (NARCAN) nasal spray     NIACIN PO     omeprazole (PRILOSEC) 20 MG CR capsule     ondansetron (ZOFRAN) 8 MG tablet     ondansetron (ZOFRAN-ODT) 8 MG ODT tab     opium tincture 10 MG/ML (1%) liquid     oxyCODONE IR (ROXICODONE) 10 MG tablet     oxyCODONE IR (ROXICODONE) 5 MG tablet     parenteral nutrition - PTA/DISCHARGE ORDER     prochlorperazine (COMPAZINE) 10 MG tablet     sodium chloride 0.9% SOLN BOLUS     No current facility-administered medications for this visit.        EXAM:  /74 (BP Location:  Left arm, Cuff Size: Adult Regular)  Pulse 109  Temp 98.3  F (36.8  C) (Oral)  Resp 16  Wt 69.9 kg (154 lb 3.2 oz)  SpO2 96%  BMI 22.76 kg/m2  Wt Readings from Last 4 Encounters:   07/20/18 69.9 kg (154 lb 3.2 oz)   07/10/18 73 kg (161 lb)   07/08/18 74.7 kg (164 lb 9.6 oz)   06/28/18 69.2 kg (152 lb 9.6 oz)        General: Appears stated age, in no acute distress  HEENT: EOMI, PERRL, no scleral icterus, mucus membranes moist and no lesions or thrush  Lymph: Neck is supple and shows no cervical or supraclavicular JOHNNY  Heart:  RRR, no murmur, gallop or rub  Lungs: CTA-B, no wheezes, rhonchi or rales  Abdomen: Normal bowel sounds, soft, mild central abdominal tenderness with central firmness. Ostomy in place with green soft/liquid stool.  Extremities: No edema in bilateral lower extremitites  Skin: No significant rashes or lesions  Neuro: CN II-XII are intact    LABS:   Results for KARINA MINER (MRN [id number]) as of 7/20/2018 16:38   7/20/2018 08:40   Sodium 130 (L)   Potassium 4.3   Chloride 95   Carbon Dioxide 29   Urea Nitrogen 32 (H)   Creatinine 0.85   GFR Estimate >90   GFR Estimate If Black >90   Calcium 8.9   Anion Gap 7   Magnesium 2.1   Albumin 2.5 (L)   Protein Total 8.6   Bilirubin Total 0.3   Alkaline Phosphatase 119   ALT 15   AST 21   Glucose 114 (H)   WBC 10.6   Hemoglobin 9.7 (L)   Hematocrit 29.5 (L)   Platelet Count 574 (H)   RBC Count 3.40 (L)   MCV 87   MCH 28.5   MCHC 32.9   RDW 18.1 (H)   Diff Method Automated Method   % Neutrophils 69.6   % Lymphocytes 13.7   % Monocytes 13.9   % Eosinophils 2.1   % Basophils 0.3   % Immature Granulocytes 0.4   Nucleated RBCs 0   Absolute Neutrophil 7.4   Absolute Lymphocytes 1.5   Absolute Monocytes 1.5 (H)   Absolute Eosinophils 0.2   Absolute Basophils 0.0   Abs Immature Granulocytes 0.0   Absolute Nucleated RBC 0.0     IMAGING:   No new images today    IMPRESSION/PLAN:  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  The sigmoid  carcinoma is obstructing, requiring exploratory laparotomy and end ileostomy along with small bowel resection.    MSI intact and NGS panel does not reveal any identifiable mutations. He was previously on 5-FU infusions and oxaliplatin, though CT showed worsening disease. Was due to switch therapy to irinotecan and panitumumab though was hospitalized on 7/3 for abdominal pain and bloating as well as rectal bleeding. Found to have colitis with increased fluid dilation of the colon. GI did a flex sig and placed a colon stent for the third time. Other stents were found to have ingrowth of tumor.   -Started on Cipro and Flagyl for the colitis. Finished 10 day course  -Scheduled to start chemotherapy this week but he cancelled as was not feeling well. He is now feeling better but has concerns about irinotecan, panitumumab.   -[facility] handouts provided. We discussed that while myelosuppression can occur, unlikely to be more than he would have had with FOLFOX and that we will be monitoring his counts closely. Discussed that aloepecia can occur with irinotecan, about 46-70% per UpTo[employer]. Spoke with Linda Alves. Although nail bed infections can occur, she estimates only about 15% of patients experience this. Discussed that diarrhea/increased ostomy output may be more of an issue with irinotecan. He will look over the information given today but would like us to schedule him to start next Friday, as Fridays are better days for him to get here.     Abdominal pain due to peritoneal carcinomatosis and colon obstruction.   -had his colon stented by GI for the third time. Rectal output is now clear without blood.   -He had worsening pain and some associated nausea and anorexia this week, but ostomy and rectal stooling output about the same. This has improved in last 2 days.  -Seen by palliative care today with Dr. Trujillo.   Plan: Increase the fentanyl to 37 mcg/h.  We are having insurance problems with the number of oxycodone  pills so switching to 20 mg pills.  He can take 20 or 30 mg every 4 hours as needed.  Realistically there is not much difference between that and him taking 25 mg.    Edema  -resolved     FEN.   -Patient remains on 12 hours TPN/day but po intake has declined to only 1 protein shake/day. Spoke with [facility] to make sure his TPN is currently meeting 100% of his nutritional needs.     Hyponatremia. sodium 130 today and BUN/Cr ratio elevated. He feels a bit dehdyrated. Likely 2/2 to hypovolemia. Encouraged him to give himself 1L NS IVF daily for next few days     High output ostomy.   Will continue with tincture of opium qid. Lomotil given on previous visit. Call if he needs a refill     History of iron deficient anemia.   Hemoglobin was low in the hospital 2/2 rectal bleeding and needed a transfusion on 7/6. His Hgb is trending upward now. Recommended he resume iron when his abdominal distention and nausea have improved.    Jacque Dale PA-C  [facility]  [address], [place] [id number] [phone]    ANNAMARIE Celestin

## 2018-07-20 NOTE — MR AVS SNAPSHOT
"              After Visit Summary   7/20/2018    Sebas Lopez    MRN: [id number]           Patient Information     Date Of Birth          1965        Visit Information        Provider Department      7/20/2018 8:50 AM Jacque Dale PA [facility]        Today's Diagnoses     Cancer of sigmoid colon (H)    -  1    Peritoneal carcinomatosis (H)           Follow-ups after your visit        Who to contact     If you have questions or need follow up information about today's clinic visit or your schedule please contact [facility] directly at [phone].  Normal or non-critical lab and imaging results will be communicated to you by [employer], letter or phone within 4 business days after the clinic has received the results. If you do not hear from us within 7 days, please contact the clinic through [employer] or phone. If you have a critical or abnormal lab result, we will notify you by phone as soon as possible.  Submit refill requests through [employer] or call your pharmacy and they will forward the refill request to us. Please allow 3 business days for your refill to be completed.          Additional Information About Your Visit        MyChart Information     [employer] gives you secure access to your electronic health record. If you see a primary care provider, you can also send messages to your care team and make appointments. If you have questions, please call your primary care clinic.  If you do not have a primary care provider, please call [phone] and they will assist you.        Care EveryWhere ID     This is your Care EveryWhere ID. This could be used by other organizations to access your [place] medical records  [id number]        Your Vitals Were     Pulse Temperature Respirations Height Pulse Oximetry BMI (Body Mass Index)    109 98.3  F (36.8  C) (Oral) 16 1.753 m (5' 9\") 96% 22.77 kg/m2       Blood Pressure from Last 3 Encounters:   07/20/18 111/74 "   07/10/18 112/71   07/08/18 118/82    Weight from Last 3 Encounters:   07/20/18 69.9 kg (154 lb 3.2 oz)   07/10/18 73 kg (161 lb)   07/08/18 74.7 kg (164 lb 9.6 oz)              Today, you had the following     No orders found for display         Today's Medication Changes          These changes are accurate as of 7/20/18  4:49 PM.  If you have any questions, ask your nurse or doctor.               These medicines have changed or have updated prescriptions.        Dose/Directions    * fentaNYL 25 mcg/hr 72 hr patch   Commonly known as:  DURAGESIC   This may have changed:  Another medication with the same name was added. Make sure you understand how and when to take each.   Used for:  Cancer of sigmoid colon (H)   Changed by:  Lorenzo Trujillo MD        Dose:  1 patch   Place 1 patch onto the skin every 72 hours remove old patch.   Quantity:  1 patch   Refills:  0       * FentaNYL 37.5 MCG/HR Pt72   This may have changed:  You were already taking a medication with the same name, and this prescription was added. Make sure you understand how and when to take each.   Used for:  Colon adenocarcinoma (H)   Changed by:  Lorenzo Trujillo MD        Dose:  37 mcg/hr   Place 37 mcg/hr onto the skin every 72 hours   Quantity:  10 patch   Refills:  0       * oxyCODONE IR 5 MG tablet   Commonly known as:  ROXICODONE   This may have changed:  Another medication with the same name was changed. Make sure you understand how and when to take each.   Used for:  Cancer of sigmoid colon (H)   Changed by:  Lorenzo Trujillo MD        Take 15-25 mg q4 hrs as needed for moderate to severe pain (can take with 10 mg tabs of previous prescription to make 15 mg or 25 mg doses).   Quantity:  20 tablet   Refills:  0       * oxyCODONE HCl 20 MG Tabs immediate release tablet   Commonly known as:  ROXICODONE   This may have changed:    - medication strength  - how much to take  - how to take this  - when to take this  - reasons to  take this  - additional instructions   Used for:  Peritoneal carcinomatosis (H)   Changed by:  Lorenzo Trujillo MD        Dose:  20-30 mg   Take 20-30 mg by mouth every 4 hours as needed for moderate to severe pain Take 20-30 mg every 4 hours as needed for moderate to severe pain   Quantity:  180 tablet   Refills:  0       * Notice:  This list has 4 medication(s) that are the same as other medications prescribed for you. Read the directions carefully, and ask your doctor or other care provider to review them with you.         Where to get your medicines      Some of these will need a paper prescription and others can be bought over the counter.  Ask your nurse if you have questions.     Bring a paper prescription for each of these medications     FentaNYL 37.5 MCG/HR Pt72    oxyCODONE HCl 20 MG Tabs immediate release tablet                Primary Care Provider Office Phone # Fax #    Jaguar Becker [phone]       [address] [address]        Equal Access to Services     SARAH DUNN : Hadii karrie ku hadasho Soomaali, waaxda luqadaha, qaybta kaalmada adeegyada, waxay stella haymino saeg . So [facility] [phone].    ATENCIÓN: Si habla español, tiene a cid disposición servicios gratuitos de asistencia lingüística. Llame al [phone].    We comply with applicable federal civil rights laws and [place] laws. We do not discriminate on the basis of race, color, national origin, age, disability, sex, sexual orientation, or gender identity.            Thank you!     Thank you for choosing [facility]  for your care. Our goal is always to provide you with excellent care. Hearing back from our patients is one way we can continue to improve our services. Please take a few minutes to complete the written survey that you may receive in the mail after your visit with us. Thank you!             Your Updated Medication List - Protect others  around you: Learn how to safely use, store and throw away your medicines at www.disposemymeds.org.          This list is accurate as of 7/20/18  4:49 PM.  Always use your most recent med list.                   Brand Name Dispense Instructions for use Diagnosis    amylase-lipase-protease 33889 units Cpep    CREON    240 capsule    Take 2 capsules (72,000 Units) by mouth 3 times daily (with meals) And 1 capsule with each snack tid.    Diarrhea, unspecified type, Peritoneal carcinomatosis (H), Cancer of sigmoid colon (H)       cyanocobalamin 1000 MCG tablet    vitamin  B-12     Take 1,000 mcg by mouth daily        dronabinol 5 MG capsule    MARINOL    60 capsule    Take 1 capsule (5 mg) by mouth 2 times daily (before meals)    Peritoneal carcinomatosis (H), Anorexia       * fentaNYL 25 mcg/hr 72 hr patch    DURAGESIC    1 patch    Place 1 patch onto the skin every 72 hours remove old patch.    Cancer of sigmoid colon (H)       * FentaNYL 37.5 MCG/HR Pt72     10 patch    Place 37 mcg/hr onto the skin every 72 hours    Colon adenocarcinoma (H)       FLAGYL 500 MG tablet   Generic drug:  metroNIDAZOLE     16 tablet    Take 1 tablet (500 mg) by mouth 3 times daily    Colitis       loperamide 2 MG capsule    IMODIUM    30 capsule    2 caps at 1st sign of diarrhea & 1 cap every 2hrs until 12hrs diarrhea free. During night, 2 caps at bedtime & 2 caps every 4hrs until AM    Peritoneal carcinomatosis (H), Cancer of sigmoid colon (H)       * LORazepam 0.5 MG tablet    ATIVAN    30 tablet    Take 1 tablet (0.5 mg) by mouth every 4 hours as needed (Anxiety, Nausea/Vomiting or Sleep)    Peritoneal carcinomatosis (H), Cancer of sigmoid colon (H)       * LORazepam 0.5 MG tablet    ATIVAN    30 tablet    Take 1 tablet (0.5 mg) by mouth every 4 hours as needed (Anxiety, Nausea/Vomiting or Sleep)    Peritoneal carcinomatosis (H), Cancer of sigmoid colon (H)       MULTIVITAMIN & MINERAL PO      Take 1 tablet by mouth daily         naloxone nasal spray    NARCAN    0.2 mL    Spray 1 spray (4 mg) into one nostril alternating nostrils as needed for opioid reversal every 2-3 minutes until assistance arrives    Cancer of sigmoid colon (H), Long term current use of opiate analgesic       NIACIN PO      Take by mouth daily        omeprazole 20 MG CR capsule    priLOSEC    30 capsule    Take 1 capsule (20 mg) by mouth daily    Diarrhea, unspecified type       ondansetron 8 MG ODT tab    ZOFRAN-ODT    60 tablet    Take 1 tablet (8 mg) by mouth every 8 hours as needed for nausea    Nausea       ondansetron 8 MG tablet    ZOFRAN    10 tablet    Take 1 tablet (8 mg) by mouth every 8 hours as needed (Nausea/Vomiting)    Peritoneal carcinomatosis (H), Cancer of sigmoid colon (H)       opium tincture 10 MG/ML (1%) liquid     72 mL    Take 0.6 mLs (6 mg) by mouth 4 times daily    Cancer of sigmoid colon (H), Diarrhea, unspecified type       * oxyCODONE IR 5 MG tablet    ROXICODONE    20 tablet    Take 15-25 mg q4 hrs as needed for moderate to severe pain (can take with 10 mg tabs of previous prescription to make 15 mg or 25 mg doses).    Cancer of sigmoid colon (H)       * oxyCODONE HCl 20 MG Tabs immediate release tablet    ROXICODONE    180 tablet    Take 20-30 mg by mouth every 4 hours as needed for moderate to severe pain Take 20-30 mg every 4 hours as needed for moderate to severe pain    Peritoneal carcinomatosis (H)       parenteral nutrition - PTA/DISCHARGE ORDER      Inject into the vein daily [facility]        prochlorperazine 10 MG tablet    COMPAZINE    30 tablet    Take 1 tablet (10 mg) by mouth every 6 hours as needed (Nausea/Vomiting)    Peritoneal carcinomatosis (H), Cancer of sigmoid colon (H)       sodium chloride 0.9% Soln BOLUS     1000 mL    Inject 1,000 mLs into the vein daily as needed For hydration.        * Notice:  This list has 6 medication(s) that are the same as other medications prescribed for you. Read the directions carefully, and  ask your doctor or other care provider to review them with you.

## 2018-07-20 NOTE — MR AVS SNAPSHOT
After Visit Summary   7/20/2018    Sebas Lopez    MRN: [id number]           Patient Information     Date Of Birth          1965        Visit Information        Provider Department      7/20/2018 11:00 AM Lorenzo Trujillo MD [facility]        Today's Diagnoses     Colon adenocarcinoma (H)    -  1    Peritoneal carcinomatosis (H)        Neoplasm related pain          Care Instructions    [facility]   The [facility] Palliative Care Team is committed to treating your pain and other symptoms. This handout is for all patients treated with opioid medications in our clinics.     What are opioid medicines?    Opioid medications are used to ease some types of pain and shortness of breath. They are  sometimes called narcotics. They include: Morphine (MS Contin, Roxanol), Oxycodone (OxyContin,  OxyFast, Percocet), Hydrocodone (Vicodin, Norco), Hydromorphone (Dilaudid), Fentanyl (Duragesic), Methadone (Dolophine).   Are opioid medicines safe to take?    Opioid medicines are safe when you follow the directions from your doctor or medical provider.   Opioids can cause serious side effects and become unsafe if you do not follow your instructions.    To make sure you are taking opioid medicines safely, we may ask you to bring in your pill bottles or to allow us to test your urine. Our goal is to keep you safe and to improve your ability to function & be active. If we don t think opioids are safely doing that, we will work with you to taper you off of them.    Do not drive unless your medical provider tells you it is safe.    Do not take opioids with alcohol or anxiety/sleep medicines unless your doctor tells you it is ok to do so.   Are opioids addictive?    Addiction means you crave a drug and have trouble limiting the amount you use.    Addiction is more likely to happen if you take opioids to relieve stress or to  feel altered. If you or your loved one feels this way when taking opioid medicines, let your medical provider know. We may refer you for additional assessment or services if this is a concern.    Your body will get used to the opioid medicines if you take them for more than two weeks in a row. This means if you stop them suddenly, you may have withdrawal. If this occurs, you will feel uncomfortable (nausea, diarrhea, increased pain). This does not mean you are addicted. Never  stop taking your pain medicines all at once unless your medical provider tells you to. If you think you need less medicine, your medical provider will help you to safely lower your dose.   What is the safest way to store opioids?    Keep your medicines in a safe place away from children, teens, pets, and visitors. Be careful about  who knows that you have these medicines.   How do I get rid of my old opioids?    Opioids should be brought to your [facility] drop-off site, or you can purchase a disposal kit from your local pharmacy. If neither of these options is available, the Food and Drug Administration recommends that you flush unused opioids down the toilet.    Do not share or sell your pain medicines. This is illegal and very dangerous. We cannot prescribe opioid pain medicines to you if do this.   How do I get refills?    Opioid medicines need signed paper prescriptions. This means it may take longer to refill than other medicines. Our clinic cannot prescribe them on weekends or at night.     Give us one week s notice when requesting a refill. This gives us the time we need to get it signed and back to you. It may be possible to mail prescriptions to you.            Follow-ups after your visit        Who to contact     If you have questions or need follow up information about today's clinic visit or your schedule please contact [facility] directly at [phone].  Normal or non-critical lab and imaging results will be  communicated to you by Nutmeghart, letter or phone within 4 business days after the clinic has received the results. If you do not hear from us within 7 days, please contact the clinic through Muzui or phone. If you have a critical or abnormal lab result, we will notify you by phone as soon as possible.  Submit refill requests through Muzui or call your pharmacy and they will forward the refill request to us. Please allow 3 business days for your refill to be completed.          Additional Information About Your Visit        Muzui Information     Muzui gives you secure access to your electronic health record. If you see a primary care provider, you can also send messages to your care team and make appointments. If you have questions, please call your primary care clinic.  If you do not have a primary care provider, please call [phone] and they will assist you.        Care EveryWhere ID     This is your Care EveryWhere ID. This could be used by other organizations to access your [facility] medical records  [id number]         Blood Pressure from Last 3 Encounters:   07/20/18 111/74   07/10/18 112/71   07/08/18 118/82    Weight from Last 3 Encounters:   07/20/18 69.9 kg (154 lb 3.2 oz)   07/10/18 73 kg (161 lb)   07/08/18 74.7 kg (164 lb 9.6 oz)              Today, you had the following     No orders found for display         Today's Medication Changes          These changes are accurate as of 7/20/18  1:50 PM.  If you have any questions, ask your nurse or doctor.               These medicines have changed or have updated prescriptions.        Dose/Directions    * fentaNYL 25 mcg/hr 72 hr patch   Commonly known as:  DURAGESIC   This may have changed:  Another medication with the same name was added. Make sure you understand how and when to take each.   Used for:  Cancer of sigmoid colon (H)   Changed by:  Lorenzo Trujillo MD        Dose:  1 patch   Place 1 patch onto the skin every 72 hours remove old  patch.   Quantity:  1 patch   Refills:  0       * FentaNYL 37.5 MCG/HR Pt72   This may have changed:  You were already taking a medication with the same name, and this prescription was added. Make sure you understand how and when to take each.   Used for:  Colon adenocarcinoma (H)   Changed by:  Lorenzo Trujillo MD        Dose:  37 mcg/hr   Place 37 mcg/hr onto the skin every 72 hours   Quantity:  10 patch   Refills:  0       * oxyCODONE IR 5 MG tablet   Commonly known as:  ROXICODONE   This may have changed:  Another medication with the same name was changed. Make sure you understand how and when to take each.   Used for:  Cancer of sigmoid colon (H)   Changed by:  Lorenzo Trujillo MD        Take 15-25 mg q4 hrs as needed for moderate to severe pain (can take with 10 mg tabs of previous prescription to make 15 mg or 25 mg doses).   Quantity:  20 tablet   Refills:  0       * oxyCODONE HCl 20 MG Tabs immediate release tablet   Commonly known as:  ROXICODONE   This may have changed:    - medication strength  - how much to take  - how to take this  - when to take this  - reasons to take this  - additional instructions   Used for:  Peritoneal carcinomatosis (H)   Changed by:  Lorenzo Trujillo MD        Dose:  20-30 mg   Take 20-30 mg by mouth every 4 hours as needed for moderate to severe pain Take 20-30 mg every 4 hours as needed for moderate to severe pain   Quantity:  180 tablet   Refills:  0       * Notice:  This list has 4 medication(s) that are the same as other medications prescribed for you. Read the directions carefully, and ask your doctor or other care provider to review them with you.         Where to get your medicines      Some of these will need a paper prescription and others can be bought over the counter.  Ask your nurse if you have questions.     Bring a paper prescription for each of these medications     FentaNYL 37.5 MCG/HR Pt72    oxyCODONE HCl 20 MG Tabs immediate release tablet                Information about OPIOIDS     PRESCRIPTION OPIOIDS: WHAT YOU NEED TO KNOW   We gave you an opioid (narcotic) pain medicine. It is important to manage your pain, but opioids are not always the best choice. You should first try all the other options your care team gave you. Take this medicine for as short a time (and as few doses) as possible.     These medicines have risks:    DO NOT drive when on new or higher doses of pain medicine. These medicines can affect your alertness and reaction times, and you could be arrested for driving under the influence (DUI). If you need to use opioids long-term, talk to your care team about driving.    DO NOT operate heave machinery    DO NOT do any other dangerous activities while taking these medicines.     DO NOT drink any alcohol while taking these medicines.      If the opioid prescribed includes acetaminophen, DO NOT take with any other medicines that contain acetaminophen. Read all labels carefully. Look for the word  acetaminophen  or  Tylenol.  Ask your pharmacist if you have questions or are unsure.    You can get addicted to pain medicines, especially if you have a history of addiction (chemical, alcohol or substance dependence). Talk to your care team about ways to reduce this risk.    Store your pills in a secure place, locked if possible. We will not replace any lost or stolen medicine. If you don t finish your medicine, please throw away (dispose) as directed by your pharmacist. The [place] Pollution Control Agency has more information about safe disposal: https://www.pca.[facility].[place].us/living-green/managing-unwanted-medications.     All opioids tend to cause constipation. Drink plenty of water and eat foods that have a lot of fiber, such as fruits, vegetables, prune juice, apple juice and high-fiber cereal. Take a laxative (Miralax, milk of magnesia, Colace, Senna) if you don t move your bowels at least every other day.          Primary Care Provider  Office Phone # Fax #    [facility] [phone]       [facility] [address]  [facility] [id number]        Equal Access to Services     SARAH DUNN : Cristina karrie reddy cady Funk, wahayderda luqadaha, qaybta kamaddyda lewis, bhargav stearns angelbenita lawrence laginabrice willis. So [facility] [phone].    ATENCIÓN: Si habla español, tiene a cid disposición servicios gratuitos de asistencia lingüística. Llame al [phone].    We comply with applicable federal civil rights laws and [place] laws. We do not discriminate on the basis of race, color, national origin, age, disability, sex, sexual orientation, or gender identity.            Thank you!     Thank you for choosing [facility]  for your care. Our goal is always to provide you with excellent care. Hearing back from our patients is one way we can continue to improve our services. Please take a few minutes to complete the written survey that you may receive in the mail after your visit with us. Thank you!             Your Updated Medication List - Protect others around you: Learn how to safely use, store and throw away your medicines at www.disposemymeds.org.          This list is accurate as of 7/20/18  1:50 PM.  Always use your most recent med list.                   Brand Name Dispense Instructions for use Diagnosis    amylase-lipase-protease 14440 units Cpep    CREON    240 capsule    Take 2 capsules (72,000 Units) by mouth 3 times daily (with meals) And 1 capsule with each snack tid.    Diarrhea, unspecified type, Peritoneal carcinomatosis (H), Cancer of sigmoid colon (H)       cyanocobalamin 1000 MCG tablet    vitamin  B-12     Take 1,000 mcg by mouth daily        dronabinol 5 MG capsule    MARINOL    60 capsule    Take 1 capsule (5 mg) by mouth 2 times daily (before meals)    Peritoneal carcinomatosis (H), Anorexia       * fentaNYL 25 mcg/hr 72 hr patch    DURAGESIC    1 patch    Place 1 patch onto the skin every 72  hours remove old patch.    Cancer of sigmoid colon (H)       * FentaNYL 37.5 MCG/HR Pt72     10 patch    Place 37 mcg/hr onto the skin every 72 hours    Colon adenocarcinoma (H)       FLAGYL 500 MG tablet   Generic drug:  metroNIDAZOLE     16 tablet    Take 1 tablet (500 mg) by mouth 3 times daily    Colitis       loperamide 2 MG capsule    IMODIUM    30 capsule    2 caps at 1st sign of diarrhea & 1 cap every 2hrs until 12hrs diarrhea free. During night, 2 caps at bedtime & 2 caps every 4hrs until AM    Peritoneal carcinomatosis (H), Cancer of sigmoid colon (H)       * LORazepam 0.5 MG tablet    ATIVAN    30 tablet    Take 1 tablet (0.5 mg) by mouth every 4 hours as needed (Anxiety, Nausea/Vomiting or Sleep)    Peritoneal carcinomatosis (H), Cancer of sigmoid colon (H)       * LORazepam 0.5 MG tablet    ATIVAN    30 tablet    Take 1 tablet (0.5 mg) by mouth every 4 hours as needed (Anxiety, Nausea/Vomiting or Sleep)    Peritoneal carcinomatosis (H), Cancer of sigmoid colon (H)       MULTIVITAMIN & MINERAL PO      Take 1 tablet by mouth daily        naloxone nasal spray    NARCAN    0.2 mL    Spray 1 spray (4 mg) into one nostril alternating nostrils as needed for opioid reversal every 2-3 minutes until assistance arrives    Cancer of sigmoid colon (H), Long term current use of opiate analgesic       NIACIN PO      Take by mouth daily        omeprazole 20 MG CR capsule    priLOSEC    30 capsule    Take 1 capsule (20 mg) by mouth daily    Diarrhea, unspecified type       ondansetron 8 MG ODT tab    ZOFRAN-ODT    60 tablet    Take 1 tablet (8 mg) by mouth every 8 hours as needed for nausea    Nausea       ondansetron 8 MG tablet    ZOFRAN    10 tablet    Take 1 tablet (8 mg) by mouth every 8 hours as needed (Nausea/Vomiting)    Peritoneal carcinomatosis (H), Cancer of sigmoid colon (H)       opium tincture 10 MG/ML (1%) liquid     72 mL    Take 0.6 mLs (6 mg) by mouth 4 times daily    Cancer of sigmoid colon (H),  Diarrhea, unspecified type       * oxyCODONE IR 5 MG tablet    ROXICODONE    20 tablet    Take 15-25 mg q4 hrs as needed for moderate to severe pain (can take with 10 mg tabs of previous prescription to make 15 mg or 25 mg doses).    Cancer of sigmoid colon (H)       * oxyCODONE HCl 20 MG Tabs immediate release tablet    ROXICODONE    180 tablet    Take 20-30 mg by mouth every 4 hours as needed for moderate to severe pain Take 20-30 mg every 4 hours as needed for moderate to severe pain    Peritoneal carcinomatosis (H)       parenteral nutrition - PTA/DISCHARGE ORDER      Inject into the vein daily [facility]        prochlorperazine 10 MG tablet    COMPAZINE    30 tablet    Take 1 tablet (10 mg) by mouth every 6 hours as needed (Nausea/Vomiting)    Peritoneal carcinomatosis (H), Cancer of sigmoid colon (H)       sodium chloride 0.9% Soln BOLUS     1000 mL    Inject 1,000 mLs into the vein daily as needed For hydration.        * Notice:  This list has 6 medication(s) that are the same as other medications prescribed for you. Read the directions carefully, and ask your doctor or other care provider to review them with you.

## 2018-07-20 NOTE — PROGRESS NOTES
This is a recent snapshot of the patient's [facility] medical record.  For current drug dose and complete information and questions, call [phone] or In Basket pool, [facility] ([id number])  CSN Number:  [id number]

## 2018-07-20 NOTE — PATIENT INSTRUCTIONS
[facility]   The [facility] Palliative Care Team is committed to treating your pain and other symptoms. This handout is for all patients treated with opioid medications in our clinics.     What are opioid medicines?    Opioid medications are used to ease some types of pain and shortness of breath. They are  sometimes called narcotics. They include: Morphine (MS Contin, Roxanol), Oxycodone (OxyContin,  OxyFast, Percocet), Hydrocodone (Vicodin, Norco), Hydromorphone (Dilaudid), Fentanyl (Duragesic), Methadone (Dolophine).   Are opioid medicines safe to take?    Opioid medicines are safe when you follow the directions from your doctor or medical provider.   Opioids can cause serious side effects and become unsafe if you do not follow your instructions.    To make sure you are taking opioid medicines safely, we may ask you to bring in your pill bottles or to allow us to test your urine. Our goal is to keep you safe and to improve your ability to function & be active. If we don t think opioids are safely doing that, we will work with you to taper you off of them.    Do not drive unless your medical provider tells you it is safe.    Do not take opioids with alcohol or anxiety/sleep medicines unless your doctor tells you it is ok to do so.   Are opioids addictive?    Addiction means you crave a drug and have trouble limiting the amount you use.    Addiction is more likely to happen if you take opioids to relieve stress or to feel altered. If you or your loved one feels this way when taking opioid medicines, let your medical provider know. We may refer you for additional assessment or services if this is a concern.    Your body will get used to the opioid medicines if you take them for more than two weeks in a row. This means if you stop them suddenly, you may have withdrawal. If this occurs, you will feel uncomfortable (nausea, diarrhea, increased pain). This  does not mean you are addicted. Never  stop taking your pain medicines all at once unless your medical provider tells you to. If you think you need less medicine, your medical provider will help you to safely lower your dose.   What is the safest way to store opioids?    Keep your medicines in a safe place away from children, teens, pets, and visitors. Be careful about  who knows that you have these medicines.   How do I get rid of my old opioids?    Opioids should be brought to your [facility] drop-off site, or you can purchase a disposal kit from your local pharmacy. If neither of these options is available, the Food and Drug Administration recommends that you flush unused opioids down the toilet.    Do not share or sell your pain medicines. This is illegal and very dangerous. We cannot prescribe opioid pain medicines to you if do this.   How do I get refills?    Opioid medicines need signed paper prescriptions. This means it may take longer to refill than other medicines. Our clinic cannot prescribe them on weekends or at night.     Give us one week s notice when requesting a refill. This gives us the time we need to get it signed and back to you. It may be possible to mail prescriptions to you.

## 2018-07-20 NOTE — PROGRESS NOTES
Palliative Staff/Outpatient Clinic    (This note was transcribed using voice recognition software. While I review and edit the transcription, I may miss errors, and the software sometimes does unexpected capitalizations and formatting that I miss. 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  Seen by Dr. wilson, palliative fellow last year.  Recurrent bowel obstructions which been successfully treated with sigmoid stenting to date.  Hospitalization July 2018 for the same which he was restented and is now home on TPN.    [facility]iative [facility] Opioid Prescribing Safety Plan    Opioid Safety Education: Reviewed by Lorenzo Trujillo on 07/20/18  Opioid Risk Assessment: Performed byLorenzo Trujillo on 07/20/18. ORT 0  Mood Disorder Assessment: Performed byLorenzo Trujillo on 07/20/18   reviewed with every prescription, last reviewed by Lorenzo Trujillo on 07/20/2018     Expected prognosis: <1 year  Risk: Low or Medium (ORT 0-7)  No further recommendations.     History:  He is with his wife who supplements history.  His major concern today is his pain.    He reports his pain was well managed in the hospital.  That was with 25 mcg/h of transdermal fentanyl and taking about 20 or 25 mg of oxycodone 4 or so times a day.  He reports excellent pain relief with that with minimal side effects.  His ileostomy draining liquid stool and actually he uses tincture of opium to slow it down as needed.    When he is discharged from the hospital his pain worsened temporarily and he had some increased bloating and weight gain.  He increased his oxycodone use to up to 6 times a day with good effects.  The last couple days his pain is been better and is not as bloated.  He is not vomiting.  His ileostomy continues to drain adequately he thinks.  His pain  goes down to nearly 0 with 25 mg of oxycodone unless that way for 4-6 hours.    He reports his mood is adequate.  He is sleeping okay.  He gets out of the garden most days.  He is very weak and tired but independent in his ADLs.  He does not feel like he is recovered at all from his recent bowel obstruction though he has only been home for about a week.  He is being followed closely by his oncology team.    SH: Reviewed, this with his significant other.  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    ROS: Comprehensive review of systems is negative except as above    PE: Vitals noted    Wt Readings from Last 3 Encounters:   07/20/18 69.9 kg (154 lb 3.2 oz)   07/10/18 73 kg (161 lb)   07/08/18 74.7 kg (164 lb 9.6 oz)     Alert, comfortable appearing, NAD. Chronically ill appearing  Head NCAT.  Eyes anicteric without injection  Face symmetric, eyes conjugate  Mouth pink, moist, no lesions.  Lungs unlabored, CTAB  CV rrr s1s2  Abd soft, R ileostomy with liquid green stool. Palpable firm mas L periumbilical area. No distention. NABS  Back well aligned, no masses, tenderness  No LE edema  Skin warm, dry, without lesions  MSK joints of hand normal;   Neuro Face symmetric,   Neuropsych exam normal including affect, sensorium, gross memory, thought processes, and fund of knowledge.       Impression & Recommendations:  53-year-old man with metastatic colon cancer and a bowel obstruction, complicated by pain.    I recommended we increase the fentanyl to 37 mcg/h.  We are having insurance problems with the number of oxycodone pills so I am switching him to 20 mg pills.  He can take 20 or 30 mg every 4 hours as needed.  Realistically there is not much difference between that and him taking 25 mg.  Talked about opioid safety and side effects and disposal.  He is doing well mood wise.  We will call next week to see how his pain doing.  Face-to-face follow-up in a month.    Chart data reviewed today:        Family History   Problem  Relation Age of Onset     Hypertension Father      Diabetes Father      Past Medical History:   Diagnosis Date     Adenocarcinoma of sigmoid colon (H)     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     Ulcerative colitis (H)      Past Surgical History:   Procedure Laterality Date     COLONOSCOPY  2017     COLONOSCOPY N/A 11/30/2017    Procedure: COLONOSCOPY;  Colonoscopy;  Surgeon: Rob Dudley MD;  Location: [facility]     COLONOSCOPY N/A 2/6/2018    Procedure: COMBINED COLONOSCOPY, STENT PLACEMENT;  COMBINED COLONOSCOPY with Colonic Stent Placement;  Surgeon: Guru Lionel Mar MD;  Location: [facility]     COLONOSCOPY N/A 3/19/2018    Procedure: COMBINED COLONOSCOPY, STENT PLACEMENT;  flexible sigmoidoscopy with stent placement and dilation;  Surgeon: Alexis Rios MD;  Location: [facility]     COLONOSCOPY N/A 7/5/2018    Procedure: COMBINED COLONOSCOPY, STENT PLACEMENT;  flexible sigmoidoscopy with colonic stent placement ;  Surgeon: Alexis Rios MD;  Location: [facility]     GI SURGERY  07/10/2017    Extensive lysis of adhesions, small bowel resection with end ileostomy.      HERNIA REPAIR       INSERT PORT VASCULAR ACCESS Right 8/10/2017    Procedure: INSERT PORT VASCULAR ACCESS;  Single Lumen Right Chest Power Port;  Surgeon: Malena Andrew PA-C;  [facility]     LAPAROSCOPY DIAGNOSTIC (GENERAL) N/A 12/6/2017    Procedure: LAPAROSCOPY DIAGNOSTIC (GENERAL);  Diagnostic Laparoscopy, Exploratory Laparotomy Anesthesia Block ;  Surgeon: Rob Dudley MD;  Location: [facility]     LAPAROTOMY EXPLORATORY N/A 12/6/2017    Procedure: LAPAROTOMY EXPLORATORY;;  Surgeon: Rob Dudley MD;  Location: [facility]     ORTHOPEDIC SURGERY Left     HIP ARTHROPLASTY     PICC INSERTION  Right 02/23/2018    5Fr DL BioFlo PICC, 44cm (3cm external) in the R basilic vein w/ tip in the SVC RA junction     Allergies   Allergen Reactions     Liquid Adhesive Rash     Dermabond, rash with pustules, occurred with abdominal surgery and port removal      Medications: I have reviewed the patient's medication profile. MNP: reviewed    Data reviewed:  I reviewed recent labs and imaging, my comments:  Cr 0.85    CT  IMPRESSION:  1. Increased fluid dilation of the colon upstream of a rectosigmoid  stent (traversing a colon mass). The colonic wall is draped over the  proximal orifice of the stent, likely impairing passage of luminal  contents into the stent with resultant obstruction.  2. Persistent diffuse colonic wall thickening, likely representing  ongoing colitis.  3. No significant change in extensive peritoneal carcinomatosis, as  described.       Thank you for involving us in the patient's care.   Lorenzo Trujillo MD / Palliative Medicine / Pager [phone] / [facility] Inpatient Team Consult Pager [phone] (answered 8am-430pm M-F) - ok to text page via [employer] / After-Hours Answering Service [phone] / Palliative Clinic in the [facility] [place] [facility] [place] [phone] (scheduling); [phone] (triage).

## 2018-07-20 NOTE — PROGRESS NOTES
[facility]  MEDICAL ONCOLOGY PROGRESS NOTE  July 20, 2018    CHIEF COMPLAINT: Sigmoid adenocarcinoma with peritoneal carcinomatosis.     ONCOLOGY HPI:   Sebas is a 53-year-old male who has a history of ulcerative colitis diagnosed more than 20 years ago, but he has not had medical management for that.  He was doing well, but in 05/2017 he presented with a few weeks of abdominal pain.  At that time, his imaging showed that he had a sigmoid wall thickening with adjacent mesenteric lymphadenopathy and free fluid near the abdominal wall mesh from his prior hernia surgery.  He subsequently underwent a colonoscopy which was incomplete and showed an obstructing sigmoid colon mass.  The biopsy from the sigmoid mass showed sigmoid adenocarcinoma.  He then developed obstructive symptoms and underwent exploratory laparotomy on 07/10/2017.  During that he was found to have diffuse peritoneal carcinomatosis.  Extensive lysis of adhesions was performed and a small bowel resection was performed with end ileostomy.  The biopsies from the multiple large peritoneal metastasis also were positive for metastatic adenocarcinoma.       He started palliative FOLFOX on 8/11/17. C#6 given on 10/26/17  After 6 cycles, he has stable disease     On 12/6/17, he had Diagnostic laparoscopy and Exploratory laparotomy, but HIPEC was not performed as Peritoneal cancer index was 26 with diffuse involvement of the small bowel as well as the small bowel mesentery.      He then presented to ED on 1/26 with abdominal pain and associated nausea. CT A/P on 1/26 with 5.2 x 4.5 x 3.6 cm proximal sigmoid mass causnig colonic obstriction with singificant distention of cecum (7cm in diameter), ascending colon and proximal transverse colon. No pneumatosis or pneumoperitoneum. Also small volume of ascites with associated findings concerning for peritoneal carcinomatosis, increased from prior studies. Dr. Dudley was consulted and recommended no  surgical intervention.     He was then seen in clinic on 1/29/2018 as well as yesterday because for the last few days he was unable to tolerate solid foods and he was getting abdominal cramping and some nausea. He also noticed that his ostomy output decreased. He was given IV fluids as well as antibiotics and yesterday he was started on OxyContin 10 mg twice a day along with p.r.n. oxycodone. Of note a few weeks ago he was started on short acting octreotide to decrease the ostomy output but he stopped taking it about one week ago and few days prior to that he noticed worsening of the abdominal cramping.     Repeat CT scan showed worsening peritoneal carcinomatosis and he is getting more symptomatic in terms of worsening abdominal pain and difficulty eating because of worsening abdominal cramping. He was admitted to the hospital with worsening colon distention and colon stent was placed which improved his symptoms.      He was recently hospitalized from 2/20-2/25/18 with presumed infectious colitis and bacteremia. His port was removed. He was discharged home on IV vancomycin via a PICC line which he completed on 3/7/2018.      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 He was hospitalized with a bowel obstruction from 3/18-3/20/18 and a colonic stent was placed on 3/19/18.  C#8 3/22/18 FOLFOX  C#9 4/5/18 FOLFOX        Repeat CT shows slightly improved disease and less ascites now.   Because of progressive fatigue, we stopped the 5-FU bolus and leucovorin and continued with the 5-FU infusion and oxaliplatin.      C#10 5/4/18  C#11 5/18/18  C#13 5/31/18  C#13 6/15/18     Repeat CT scan showed worsening peritoneal disease. There is also increased fluid in the proximal control on as well as post stent fluid in the rectum.     Plan was to switch therapy to irinotecan and panitumumab though was hospitalized on 7/3 for abdominal pain and bloating as well as rectal bleeding.  "Found to have colitis with increased fluid dilation of the colon. GI did a flex sig and placed a colon stent for the third time. Other stents were found to have ingrowth of tumor. He was discharged on oxycodone 15-25 mg q4hr and his fentanyl patch (25 mcg/hr) and cipro and flagyl until 7/14.     INTERVAL HISTORY:   About five days ago he started having more pain on center/left side of abdomen. He also thought he had more distention. He is on TPN 12h/day with additional 500mL boluses of fluid as needed. Because he was feeling more pain and having more distention, he skipped TPN one night on Monday or Tuesday. He was doing oxy 25 mg every 4 hours in addition to fentanyl 25mcg patch and that was controlling pain after hospital discharge. However, when pain became worse earlier this week, he stayed on 25 mg q4h although this was not adequate pain control. He also had more nausea. He is on scheduled Zofran and feels this reduces output and wonders if it caused more pain/distention. He did take a few lorazepam in addition. He vomited one time. He has compazine but has not been using. He started feeling better in past 2 days. Pain is \"mostly\" controlled with oxy 25 but he is now taking about every 6 hours. He took a dose at 6:30 this morning and has pain about a 4/10. Distended/bloated feeling is better. Nausea is less. Usually he has some oral intake up to 2300 calories daily. He had intended to try to wean off TPN and TPN amount had been reduced, but this week he had no appetite so did not eat aside from protein drinks about 1/day. He also has been doing about 20oz of gatorade/day and another 16-20 oz of soda or water daily. Still has no appetite. Overall he has less appetite and more nausea when he has pain. He has not been using marinol for appetite as he did not feel as though it had been working. He did increase to 10 mg prior to meal and did not feel much response. He did feel more sleepy. He is doing Creon but not " before every meal, about 3-4 capsules/day. He did have some low grade temps of .3 in past couple days.     Ostomy output has declined, he is unsure of amount. Rectal output is clear. He was using opium tincture, but he became concerned about the increased pain and distention and stopped taking it. He is having rectal output about 3-4 times daily and has not noticed increased amount or frequency.    Edema in legs much better. At baseline weight now. No neuropathy in hands/feet. No dizziness. Energy is ok.     He is concerned about the new therapy planned. He is concerned about myelosuppression in particular with irinotecan. He has less concerns about panitumumab aside from nail/skin infections. Remaining ROS negative      Allergies   Allergen Reactions     Liquid Adhesive Rash     Dermabond, rash with pustules, occurred with abdominal surgery and port removal        Current Outpatient Prescriptions   Medication     amylase-lipase-protease (CREON) 55521 UNITS CPEP     cyanocobalamin (VITAMIN  B-12) 1000 MCG tablet     dronabinol (MARINOL) 5 MG capsule     fentaNYL (DURAGESIC) 25 mcg/hr 72 hr patch     loperamide (IMODIUM) 2 MG capsule     LORazepam (ATIVAN) 0.5 MG tablet     LORazepam (ATIVAN) 0.5 MG tablet     metroNIDAZOLE (FLAGYL) 500 MG tablet     Multiple Vitamins-Minerals (MULTIVITAMIN & MINERAL PO)     naloxone (NARCAN) nasal spray     NIACIN PO     omeprazole (PRILOSEC) 20 MG CR capsule     ondansetron (ZOFRAN) 8 MG tablet     ondansetron (ZOFRAN-ODT) 8 MG ODT tab     opium tincture 10 MG/ML (1%) liquid     oxyCODONE IR (ROXICODONE) 10 MG tablet     oxyCODONE IR (ROXICODONE) 5 MG tablet     parenteral nutrition - PTA/DISCHARGE ORDER     prochlorperazine (COMPAZINE) 10 MG tablet     sodium chloride 0.9% SOLN BOLUS     No current facility-administered medications for this visit.        EXAM:  /74 (BP Location: Left arm, Cuff Size: Adult Regular)  Pulse 109  Temp 98.3  F (36.8  C) (Oral)  Resp 16   Wt 69.9 kg (154 lb 3.2 oz)  SpO2 96%  BMI 22.76 kg/m2  Wt Readings from Last 4 Encounters:   07/20/18 69.9 kg (154 lb 3.2 oz)   07/10/18 73 kg (161 lb)   07/08/18 74.7 kg (164 lb 9.6 oz)   06/28/18 69.2 kg (152 lb 9.6 oz)        General: Appears stated age, in no acute distress  HEENT: EOMI, PERRL, no scleral icterus, mucus membranes moist and no lesions or thrush  Lymph: Neck is supple and shows no cervical or supraclavicular JOHNNY  Heart:  RRR, no murmur, gallop or rub  Lungs: CTA-B, no wheezes, rhonchi or rales  Abdomen: Normal bowel sounds, soft, mild central abdominal tenderness with central firmness. Ostomy in place with green soft/liquid stool.  Extremities: No edema in bilateral lower extremitites  Skin: No significant rashes or lesions  Neuro: CN II-XII are intact    LABS:   Results for KARINA MINER (MRN [id number]) as of 7/20/2018 16:38   7/20/2018 08:40   Sodium 130 (L)   Potassium 4.3   Chloride 95   Carbon Dioxide 29   Urea Nitrogen 32 (H)   Creatinine 0.85   GFR Estimate >90   GFR Estimate If Black >90   Calcium 8.9   Anion Gap 7   Magnesium 2.1   Albumin 2.5 (L)   Protein Total 8.6   Bilirubin Total 0.3   Alkaline Phosphatase 119   ALT 15   AST 21   Glucose 114 (H)   WBC 10.6   Hemoglobin 9.7 (L)   Hematocrit 29.5 (L)   Platelet Count 574 (H)   RBC Count 3.40 (L)   MCV 87   MCH 28.5   MCHC 32.9   RDW 18.1 (H)   Diff Method Automated Method   % Neutrophils 69.6   % Lymphocytes 13.7   % Monocytes 13.9   % Eosinophils 2.1   % Basophils 0.3   % Immature Granulocytes 0.4   Nucleated RBCs 0   Absolute Neutrophil 7.4   Absolute Lymphocytes 1.5   Absolute Monocytes 1.5 (H)   Absolute Eosinophils 0.2   Absolute Basophils 0.0   Abs Immature Granulocytes 0.0   Absolute Nucleated RBC 0.0     IMAGING:   No new images today    IMPRESSION/PLAN:  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  The sigmoid carcinoma is obstructing, requiring exploratory laparotomy and end ileostomy along with small  bowel resection.    MSI intact and NGS panel does not reveal any identifiable mutations. He was previously on 5-FU infusions and oxaliplatin, though CT showed worsening disease. Was due to switch therapy to irinotecan and panitumumab though was hospitalized on 7/3 for abdominal pain and bloating as well as rectal bleeding. Found to have colitis with increased fluid dilation of the colon. GI did a flex sig and placed a colon stent for the third time. Other stents were found to have ingrowth of tumor.   -Started on Cipro and Flagyl for the colitis. Finished 10 day course  -Scheduled to start chemotherapy this week but he cancelled as was not feeling well. He is now feeling better but has concerns about irinotecan, panitumumab.   -[facility] handouts provided. We discussed that while myelosuppression can occur, unlikely to be more than he would have had with FOLFOX and that we will be monitoring his counts closely. Discussed that aloepecia can occur with irinotecan, about 46-70% per Up[employer]. Spoke with Linda Alves. Although nail bed infections can occur, she estimates only about 15% of patients experience this. Discussed that diarrhea/increased ostomy output may be more of an issue with irinotecan. He will look over the information given today but would like us to schedule him to start next Friday, as Fridays are better days for him to get here.     Abdominal pain due to peritoneal carcinomatosis and colon obstruction.   -had his colon stented by GI for the third time. Rectal output is now clear without blood.   -He had worsening pain and some associated nausea and anorexia this week, but ostomy and rectal stooling output about the same. This has improved in last 2 days.  -Seen by palliative care today with Dr. Trujillo.   Plan: Increase the fentanyl to 37 mcg/h.  We are having insurance problems with the number of oxycodone pills so switching to 20 mg pills.  He can take 20 or 30 mg every 4 hours as needed.   Realistically there is not much difference between that and him taking 25 mg.    Edema  -resolved     FEN.   -Patient remains on 12 hours TPN/day but po intake has declined to only 1 protein shake/day. Spoke with [facility] to make sure his TPN is currently meeting 100% of his nutritional needs.     Hyponatremia. sodium 130 today and BUN/Cr ratio elevated. He feels a bit dehdyrated. Likely 2/2 to hypovolemia. Encouraged him to give himself 1L NS IVF daily for next few days     High output ostomy.   Will continue with tincture of opium qid. Lomotil given on previous visit. Call if he needs a refill     History of iron deficient anemia.   Hemoglobin was low in the hospital 2/2 rectal bleeding and needed a transfusion on 7/6. His Hgb is trending upward now. Recommended he resume iron when his abdominal distention and nausea have improved.    Jacque Dale PA-C  [facility]  [address] [place] [address] [phone]

## 2018-07-20 NOTE — NURSING NOTE
"Oncology Rooming Note    July 20, 2018 9:02 AM   Sebas Lopez is a 53 year old male who presents for:    Chief Complaint   Patient presents with     Blood Draw     Labs drawn from PICC by RN. Line flushed with saline. Vs taken and pt checked in for appt     Oncology Clinic Visit     Colon Cancer     Initial Vitals: /74 (BP Location: Left arm, Cuff Size: Adult Regular)  Pulse 109  Temp 98.3  F (36.8  C) (Oral)  Resp 16  Ht 1.753 m (5' 9\")  Wt 69.9 kg (154 lb 3.2 oz)  SpO2 96%  BMI 22.77 kg/m2 Estimated body mass index is 22.77 kg/(m^2) as calculated from the following:    Height as of this encounter: 1.753 m (5' 9\").    Weight as of this encounter: 69.9 kg (154 lb 3.2 oz). Body surface area is 1.84 meters squared.  Mild Pain (3) Comment: Data Unavailable   No LMP for male patient.  Allergies reviewed: Yes  Medications reviewed: Yes    Medications: Medication refills not needed today.  Pharmacy name entered into [employer]: [facility] - [place] - [address]    Clinical concerns: No New Concerns    5 minutes for nursing intake (face to face time)     BRE Granda      "

## 2018-07-20 NOTE — LETTER
7/20/2018       RE: Sebas Lopez  [address] [place] [address]     Dear Colleague,    Thank you for referring your patient, Sebas Lopez, to the [facility] at [facility]. Please see a copy of my visit note below.    Palliative Staff/Outpatient Clinic    (This note was transcribed using voice recognition software. While I review and edit the transcription, I may miss errors, and the software sometimes does unexpected capitalizations and formatting that I miss. 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  Seen by Dr. wilson, palliative fellow last year.  Recurrent bowel obstructions which been successfully treated with sigmoid stenting to date.  Hospitalization July 2018 for the same which he was restented and is now home on TPN.    [facility] Outpatient Palliative Care Opioid Prescribing Safety Plan    Opioid Safety Education: Reviewed by Lorenzo Trujillo on 07/20/18  Opioid Risk Assessment: Performed byLorenzo Trujillo on 07/20/18. ORT 0  Mood Disorder Assessment: Performed byLorenzo Trujillo on 07/20/18   reviewed with every prescription, last reviewed by Lorenzo Trujillo on 07/20/2018     Expected prognosis: <1 year  Risk: Low or Medium (ORT 0-7)  No further recommendations.     History:  He is with his wife who supplements history.  His major concern today is his pain.    He reports his pain was well managed in the hospital.  That was with 25 mcg/h of transdermal fentanyl and taking about 20 or 25 mg of oxycodone 4 or so times a day.  He reports excellent pain relief with that with minimal side effects.  His ileostomy draining liquid stool and actually he uses tincture of opium to slow it down as needed.    When he is discharged from the hospital his pain worsened  temporarily and he had some increased bloating and weight gain.  He increased his oxycodone use to up to 6 times a day with good effects.  The last couple days his pain is been better and is not as bloated.  He is not vomiting.  His ileostomy continues to drain adequately he thinks.  His pain goes down to nearly 0 with 25 mg of oxycodone unless that way for 4-6 hours.    He reports his mood is adequate.  He is sleeping okay.  He gets out of the garden most days.  He is very weak and tired but independent in his ADLs.  He does not feel like he is recovered at all from his recent bowel obstruction though he has only been home for about a week.  He is being followed closely by his oncology team.    SH: Reviewed, this with his significant other.  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    ROS: Comprehensive review of systems is negative except as above    PE: Vitals noted    Wt Readings from Last 3 Encounters:   07/20/18 69.9 kg (154 lb 3.2 oz)   07/10/18 73 kg (161 lb)   07/08/18 74.7 kg (164 lb 9.6 oz)     Alert, comfortable appearing, NAD. Chronically ill appearing  Head NCAT.  Eyes anicteric without injection  Face symmetric, eyes conjugate  Mouth pink, moist, no lesions.  Lungs unlabored, CTAB  CV rrr s1s2  Abd soft, R ileostomy with liquid green stool. Palpable firm mas L periumbilical area. No distention. NABS  Back well aligned, no masses, tenderness  No LE edema  Skin warm, dry, without lesions  MSK joints of hand normal;   Neuro Face symmetric,   Neuropsych exam normal including affect, sensorium, gross memory, thought processes, and fund of knowledge.       Impression & Recommendations:  53-year-old man with metastatic colon cancer and a bowel obstruction, complicated by pain.    I recommended we increase the fentanyl to 37 mcg/h.  We are having insurance problems with the number of oxycodone pills so I am switching him to 20 mg pills.  He can take 20 or 30 mg every 4 hours as needed.  Realistically there is not  much difference between that and him taking 25 mg.  Talked about opioid safety and side effects and disposal.  He is doing well mood wise.  We will call next week to see how his pain doing.  Face-to-face follow-up in a month.    Chart data reviewed today:        Family History   Problem Relation Age of Onset     Hypertension Father      Diabetes Father      Past Medical History:   Diagnosis Date     Adenocarcinoma of sigmoid colon (H)     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     Ulcerative colitis (H)      Past Surgical History:   Procedure Laterality Date     COLONOSCOPY  2017     COLONOSCOPY N/A 11/30/2017    Procedure: COLONOSCOPY;  Colonoscopy;  Surgeon: Rob Dudley MD;  Location: [facility]     COLONOSCOPY N/A 2/6/2018    Procedure: COMBINED COLONOSCOPY, STENT PLACEMENT;  COMBINED COLONOSCOPY with Colonic Stent Placement;  Surgeon: Guru Lionel Mar MD;  Location: [facility]     COLONOSCOPY N/A 3/19/2018    Procedure: COMBINED COLONOSCOPY, STENT PLACEMENT;  flexible sigmoidoscopy with stent placement and dilation;  Surgeon: Alexis Rios MD;  Location: [facility]     COLONOSCOPY N/A 7/5/2018    Procedure: COMBINED COLONOSCOPY, STENT PLACEMENT;  flexible sigmoidoscopy with colonic stent placement ;  Surgeon: Alexis Rios MD;  Location: [facility] OR     GI SURGERY  07/10/2017    Extensive lysis of adhesions, small bowel resection with end ileostomy.      HERNIA REPAIR       INSERT PORT VASCULAR ACCESS Right 8/10/2017    Procedure: INSERT PORT VASCULAR ACCESS;  Single Lumen Right Chest Power Port;  Surgeon: Malena Andrew PA-C;  [facility]     LAPAROSCOPY DIAGNOSTIC (GENERAL) N/A 12/6/2017    Procedure: LAPAROSCOPY DIAGNOSTIC (GENERAL);  Diagnostic Laparoscopy, Exploratory  Laparotomy Anesthesia Block ;  Surgeon: Rob Dudley MD;  Location: [facility]     LAPAROTOMY EXPLORATORY N/A 12/6/2017    Procedure: LAPAROTOMY EXPLORATORY;;  Surgeon: Rob Dudley MD;  Location: [facility]     ORTHOPEDIC SURGERY Left     HIP ARTHROPLASTY     PICC INSERTION Right 02/23/2018    5Fr DL BioFlo PICC, 44cm (3cm external) in the R basilic vein w/ tip in the SVC RA junction     Allergies   Allergen Reactions     Liquid Adhesive Rash     Dermabond, rash with pustules, occurred with abdominal surgery and port removal      Medications: I have reviewed the patient's medication profile. MNPMP: reviewed    Data reviewed:  I reviewed recent labs and imaging, my comments:  Cr 0.85    CT  IMPRESSION:  1. Increased fluid dilation of the colon upstream of a rectosigmoid  stent (traversing a colon mass). The colonic wall is draped over the  proximal orifice of the stent, likely impairing passage of luminal  contents into the stent with resultant obstruction.  2. Persistent diffuse colonic wall thickening, likely representing  ongoing colitis.  3. No significant change in extensive peritoneal carcinomatosis, as  described.       Thank you for involving us in the patient's care.   Lorenzo Trujillo MD / Palliative Medicine / Pager [phone] / [facility] Inpatient Team Consult Pager [phone] (answered 8am-430pm M-F) - ok to text page via Is That Odd / After-Hours Answering Service [phone] / Palliative Clinic in the [facility] at the [facility] - [phone] (scheduling); [phone] (triage).

## 2018-07-20 NOTE — NURSING NOTE
"Oncology Rooming Note    July 20, 2018 11:23 AM   Sebas Lopez is a 53 year old male who presents for:    Chief Complaint   Patient presents with     Oncology Clinic Visit     [facility] Return     Initial Vitals: /74 Estimated body mass index is 22.77 kg/(m^2) as calculated from the following:    Height as of an earlier encounter on 7/20/18: 1.753 m (5' 9\").    Weight as of an earlier encounter on 7/20/18: 69.9 kg (154 lb 3.2 oz). There is no height or weight on file to calculate BSA.  Data Unavailable Comment: Data Unavailable   No LMP for male patient.  Allergies reviewed: Yes  Medications reviewed: Yes    Medications: MEDICATION REFILLS NEEDED TODAY. Provider was notified.  Pharmacy name entered into [employer]: [facility] [place] - [address]    Clinical concerns: Refill on fentanyl patch and oxycodone 10 mg and 5 mg. Pre approval letter from insurance that he would like Dr. Trujillo to look at.  Dr. Trujillo was notified.    10 minutes for nursing intake (face to face time)     Vini Devries LPN            "

## 2018-07-23 NOTE — TELEPHONE ENCOUNTER
"[facility] Telephone Triage Note    Assessment: Patient called in to triage reporting the following symptoms: nausea, vomiting, starting over the weekend with 1-2  episodes in the last 24 hours and \"I feel dehydrated.\"  He reports that his nausea is intermittent, and he has thrown up \"a couple of times this weekend.\"  He is doing his TPN, and has done 500 cc boluses yesterday and this morning. Denies lightheadedness, fever.  His intake overall is down.  .    Recommendations: Discussed with ANNAMARIE Thomas.  Clinic visit  tomorrow with the following procedures/labs:  no procedures,  CBC, CMP, Magnesium and Phosphorus,  Infusion of IVF, possible anti-emetics,.    Follow-Up: Order for above labs/procedures/infusion placed, Message sent to schedulers to add pt on to schedule, Informed patient of appointment times, Instructed patient to seek care immediately for worsening symptoms, including: fever, chest pain, shortness of breath, dizziness. Patient voiced understanding of advice and/or instructions given.       "

## 2018-07-23 NOTE — PROGRESS NOTES
This is a recent snapshot of the patient's [facility] medical record.  For current drug dose and complete information and questions, call [phone] or In Basket pool, [facility] ([id number])  CSN Number:  [id number]

## 2018-07-24 PROBLEM — K56.609 SMALL BOWEL OBSTRUCTION (H): Status: ACTIVE | Noted: 2018-01-01

## 2018-07-24 NOTE — LETTER
7/24/2018      RE: Sebas Lopez  [address] [place] [address]       [facility]  MEDICAL ONCOLOGY PROGRESS NOTE  July 20, 2018    CHIEF COMPLAINT: Sigmoid adenocarcinoma with peritoneal carcinomatosis.     ONCOLOGY HPI:   Sebas is a 53-year-old male who has a history of ulcerative colitis diagnosed more than 20 years ago, but he has not had medical management for that.  He was doing well, but in 05/2017 he presented with a few weeks of abdominal pain.  At that time, his imaging showed that he had a sigmoid wall thickening with adjacent mesenteric lymphadenopathy and free fluid near the abdominal wall mesh from his prior hernia surgery.  He subsequently underwent a colonoscopy which was incomplete and showed an obstructing sigmoid colon mass.  The biopsy from the sigmoid mass showed sigmoid adenocarcinoma.  He then developed obstructive symptoms and underwent exploratory laparotomy on 07/10/2017.  During that he was found to have diffuse peritoneal carcinomatosis.  Extensive lysis of adhesions was performed and a small bowel resection was performed with end ileostomy.  The biopsies from the multiple large peritoneal metastasis also were positive for metastatic adenocarcinoma.       He started palliative FOLFOX on 8/11/17. C#6 given on 10/26/17  After 6 cycles, he has stable disease     On 12/6/17, he had Diagnostic laparoscopy and Exploratory laparotomy, but HIPEC was not performed as Peritoneal cancer index was 26 with diffuse involvement of the small bowel as well as the small bowel mesentery.      He then presented to ED on 1/26 with abdominal pain and associated nausea. CT A/P on 1/26 with 5.2 x 4.5 x 3.6 cm proximal sigmoid mass causnig colonic obstriction with singificant distention of cecum (7cm in diameter), ascending colon and proximal transverse colon. No pneumatosis or pneumoperitoneum. Also small volume of ascites with associated findings concerning for peritoneal  carcinomatosis, increased from prior studies. Dr. Dudley was consulted and recommended no surgical intervention.     He was then seen in clinic on 1/29/2018 as well as yesterday because for the last few days he was unable to tolerate solid foods and he was getting abdominal cramping and some nausea. He also noticed that his ostomy output decreased. He was given IV fluids as well as antibiotics and yesterday he was started on OxyContin 10 mg twice a day along with p.r.n. oxycodone. Of note a few weeks ago he was started on short acting octreotide to decrease the ostomy output but he stopped taking it about one week ago and few days prior to that he noticed worsening of the abdominal cramping.     Repeat CT scan showed worsening peritoneal carcinomatosis and he is getting more symptomatic in terms of worsening abdominal pain and difficulty eating because of worsening abdominal cramping. He was admitted to the hospital with worsening colon distention and colon stent was placed which improved his symptoms.      He was recently hospitalized from 2/20-2/25/18 with presumed infectious colitis and bacteremia. His port was removed. He was discharged home on IV vancomycin via a PICC line which he completed on 3/7/2018.      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 He was hospitalized with a bowel obstruction from 3/18-3/20/18 and a colonic stent was placed on 3/19/18.  C#8 3/22/18 FOLFOX  C#9 4/5/18 FOLFOX        Repeat CT shows slightly improved disease and less ascites now.   Because of progressive fatigue, we stopped the 5-FU bolus and leucovorin and continued with the 5-FU infusion and oxaliplatin.      C#10 5/4/18  C#11 5/18/18  C#13 5/31/18  C#13 6/15/18     Repeat CT scan showed worsening peritoneal disease. There is also increased fluid in the proximal control on as well as post stent fluid in the rectum.     Plan was to switch therapy to irinotecan and panitumumab  though was hospitalized on 7/3 for abdominal pain and bloating as well as rectal bleeding. Found to have colitis with increased fluid dilation of the colon. GI did a flex sig and placed a colon stent for the third time. Other stents were found to have ingrowth of tumor. He was discharged on oxycodone 15-25 mg q4hr and his fentanyl patch (25 mcg/hr) and cipro and flagyl until 7/14.     INTERVAL HISTORY:   Sebas is seen in infusion today. He was seen in clinic on 7/20 and at that time had been having more abdominal pain/distention but no vomiting. Yesterday he called triage to state he had been having nausea/vomiting over the weekend and that he felt dehydrated. Today he returns for IVF/anti-emetics. His weight is down 11 lbs since last visit. He started vomiting about 3 days ago, about 3-4 times daily. He tried compazine instead of Zofran which seemed to initially to help but he has still been vomiting. Not doing any po intake aside from some sips of fluid/gingerale. He increased IVF at home to 1L daily since last visit, but has only given himself 500 ml so far today. TPN volume currently is about 2400cc he tells me.    Last visit he told me ostomy output had declined but he was unsure of amount. Today he states recently ostomy output about the same, with green, mostly liquid discharge. No rectal output in past few days. Pain on center/left side of abdomen a little worse, but relieved by vomiting. Still using fentanyl 25mcg patch, has not started 37mcg patch as he wanted to finish the 25mcg patches first. Taking oxycodone 30 mg about 5-6 times daily. No fevers, chills. He is voiding without difficulty.   Remaining ROS negative      Allergies   Allergen Reactions     Liquid Adhesive Rash     Dermabond, rash with pustules, occurred with abdominal surgery and port removal        Current Outpatient Prescriptions   Medication     amylase-lipase-protease (CREON) 40070 UNITS CPEP     cyanocobalamin (VITAMIN  B-12) 1000 MCG  tablet     dronabinol (MARINOL) 5 MG capsule     fentaNYL (DURAGESIC) 25 mcg/hr 72 hr patch     FentaNYL 37.5 MCG/HR PT72     loperamide (IMODIUM) 2 MG capsule     LORazepam (ATIVAN) 0.5 MG tablet     LORazepam (ATIVAN) 0.5 MG tablet     metroNIDAZOLE (FLAGYL) 500 MG tablet     Multiple Vitamins-Minerals (MULTIVITAMIN & MINERAL PO)     naloxone (NARCAN) nasal spray     NIACIN PO     omeprazole (PRILOSEC) 20 MG CR capsule     ondansetron (ZOFRAN) 8 MG tablet     ondansetron (ZOFRAN-ODT) 8 MG ODT tab     opium tincture 10 MG/ML (1%) liquid     oxyCODONE IR (ROXICODONE) 20 MG TABS immediate release tablet     oxyCODONE IR (ROXICODONE) 5 MG tablet     parenteral nutrition - PTA/DISCHARGE ORDER     prochlorperazine (COMPAZINE) 10 MG tablet     sodium chloride 0.9% SOLN BOLUS     Current Facility-Administered Medications   Medication     sodium chloride (PF) 0.9% PF flush 20 mL     Facility-Administered Medications Ordered in Other Visits   Medication     0.9% sodium chloride BOLUS     fosaprepitant (EMEND) 150 mg in sodium chloride 0.9 % intermittent infusion       EXAM:  /85 (BP Location: Right arm, Patient Position: Sitting, Cuff Size: Adult Regular)  Pulse 115  Temp 97.5  F (36.4  C) (Oral)  Resp 16  Wt 65 kg (143 lb 6.4 oz)  SpO2 98%  BMI 21.18 kg/m2  Wt Readings from Last 4 Encounters:   07/24/18 65 kg (143 lb 6.4 oz)   07/20/18 69.9 kg (154 lb 3.2 oz)   07/10/18 73 kg (161 lb)   07/08/18 74.7 kg (164 lb 9.6 oz)        General: Appears fatigued, in no acute distress  HEENT: EOMI, PERRL, no scleral icterus, mucus membranes moist and no lesions or thrush  Lymph: Neck is supple and shows no cervical or supraclavicular JOHNNY  Heart:  RRR, no murmur, gallop or rub  Lungs: CTA-B, no wheezes, rhonchi or rales  Abdomen: Normal bowel sounds, soft, central abdominal tenderness with central firmness. Ostomy in place with green liquid stool.  Extremities: No edema in bilateral lower extremitites  Skin: No significant  rashes or lesions  Neuro: CN II-XII are intact    LABS:   Results for KARINA MINER (MRN [id number]) as of 7/24/2018 14:39   7/24/2018 13:33   Sodium 123 (L)   Potassium 3.6   Chloride 80 (L)   Carbon Dioxide 30   Urea Nitrogen 97 (H)   Creatinine 2.03 (H)   GFR Estimate 34 (L)   GFR Estimate If Black 41 (L)   Calcium 9.6   Anion Gap 13   Magnesium 3.0 (H)   Phosphorus 5.5 (H)   Albumin 3.2 (L)   Protein Total 10.7 (H)   Bilirubin Total 0.4   Alkaline Phosphatase 169 (H)   ALT 17   AST 17   Glucose 138 (H)   WBC 16.7 (H)   Hemoglobin 11.6 (L)   Hematocrit 34.8 (L)   Platelet Count 704 (H)   RBC Count 4.24 (L)   MCV 82   MCH 27.4   MCHC 33.3   RDW 16.8 (H)   Diff Method Automated Method   % Neutrophils 66.7   % Lymphocytes 13.3   % Monocytes 15.9   % Eosinophils 0.2   % Basophils 0.7   % Immature Granulocytes 3.2   Nucleated RBCs 0   Absolute Neutrophil 11.1 (H)   Absolute Lymphocytes 2.2   Absolute Monocytes 2.7 (H)   Absolute Eosinophils 0.0   Absolute Basophils 0.1   Abs Immature Granulocytes 0.5 (H)   Absolute Nucleated RBC 0.0     IMAGING:   CT A/P pending    IMPRESSION/PLAN:    Nausea/vomiting, increased abdominal pain.  He reports increased abdominal pain that started last week in setting of peritoneal carcinomatosis and history of colonic obstruction. Last hospitalization he had colon stented by GI for third time. Now with nausea/vomiting and increased abdominal pain, concerning for obstruction. Afebrile. Leukocytosis.   --IVF NS, IV Zofran, emend  --CT A/P w/o contrast due to PAULINA  --Check ostomy output for c diff, enteric panel  --Spoke with Dr. Long, will admit to hospital  --Pain: last seen by Dr. Trujillo on 7/20. Plan to increase fentanyl patch to 37mcg/h but Karina has not increased this yet. Oxy increased to 20-30 mg q4h prn. 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  The sigmoid carcinoma is obstructing,  requiring exploratory laparotomy and end ileostomy along with small bowel resection.  MSI intact and NGS panel does not reveal any identifiable mutations. He was previously on 5-FU infusions and oxaliplatin, though CT showed worsening disease. Was due to switch therapy to irinotecan and panitumumab though was hospitalized on 7/3 for abdominal pain and bloating as well as rectal bleeding. Found to have colitis with increased fluid dilation of the colon. GI did a flex sig and placed a colon stent for the third time. Other stents were found to have ingrowth of tumor.   -Scheduled to start chemotherapy last week but he cancelled as was not feeling well. He then had concerns about irinotecan, panitumumab. [facility] handouts provided and we discussed possible side effects. Plan was to start this Friday     PAULINA. Cr up to 2.03 from 0.85 on 7/20. Likely due to dehydration despite TPN and IVF at home. He denies dysuria, oliguria.     FEN.   -Patient remains on 12 hours TPN/day but now with no po intake aside from some fluids. Spoke with [facility] last visit to make sure his TPN is currently meeting 100% of his nutritional needs.     Hyponatremia. sodium 123 today, decreased from 130 on 7/20. BUN/Cr ratio elevated. Likely 2/2 to hypovolemia. Despite home IVF increased to 1L NS daily in addition to TPN, sodium has continued to drop.     High output ostomy.   Not using opium tincture currently. He is not sure about output quantity.     History of iron deficient anemia.   Hemoglobin was low in the hospital 2/2 rectal bleeding and needed a transfusion on 7/6. He is currently off iron due to nausea/vomiting    Jacque Dale PA-C  [facility]  [address], [place] [id number] [phone]

## 2018-07-24 NOTE — IP AVS SNAPSHOT
MRN:[id number]                      After Visit Summary   7/24/2018    Sebas Lopez    MRN: [id number]           Thank you!     Thank you for choosing [facility] for your care. Our goal is always to provide you with excellent care. Hearing back from our patients is one way we can continue to improve our services. Please take a few minutes to complete the written survey that you may receive in the mail after you visit with us. Thank you!        Patient Information     Date Of Birth          1965        Designated Caregiver       Most Recent Value    Caregiver    Will someone help with your care after discharge? yes    Name of designated caregiver Bessie     Phone number of caregiver [phone]    Caregiver address same address       About your hospital stay     You were admitted on:  July 24, 2018 You last received care in the:  [address] [facility]    You were discharged on:  August 6, 2018        Reason for your hospital stay       Admitted with nausea, vomiting and increased abdominal pain.                  Who to Call     For medical emergencies, please call 911.  For non-urgent questions about your medical care, please call your primary care provider or clinic, [phone]          Attending Provider     Provider Specialty    Kaden Hardin MD Oncology       Primary Care Provider Office Phone # Fax #    Jaguar Becker [phone]       When to contact your care team       [facility] triage line at [phone] for temp >100.4, uncontrolled nausea/vomiting/diarrhea/constipation, unrelieved pain, bleeding not relieved with pressure, dizziness, chest pain, shortness of breath, loss of consciousness, and any new or concerning symptoms.                  After Care Instructions     Activity       Your activity upon discharge: activity as tolerated            Diet       Follow this diet upon discharge: Orders Placed This Encounter      Full Liquid  Diet & TPN support            Discharge Instructions       If questions or problems arise regarding tube function (e.g. leaking, dislodges, etc.) Contact Interventional Radiology department 24 hours a day.    For procedures that were done at the [facility] sites,   8:00-4:30 PM Monday through Friday    Contact:[phone].    For afterhours and weekends call the [facility] main phone line [phone] and ask for the [facility] IR on call physician number.    If DIRECTED by the RADIOLOGIST, related to specific problems with the tube functioning,  go to the Emergency Department.            Discharge Instructions       Patient to make a follow up appointment in IR clinic in 10-14 days for the removal of the retention sutures at the G or GJ tube site. Phone number is [phone] if needed.                  Follow-up Appointments     Follow Up and recommended labs and tests       Follow up as scheduled:                  Your next 10 appointments already scheduled     Aug 09, 2018  3:30 PM CDT   [facility] Draw with  [facility] DRAW   [facility] Draw ([facility])    [address] [place] [address]  [address] [place] [id number] [phone]            Aug 09, 2018  4:00 PM CDT   (Arrive by 3:45 PM)   Return Visit with Albert Long MD   [facility] ([facility])    [address] [place] [address]  [address] [place] [id number] [phone]            Aug 15, 2018  9:00 AM CDT   (Arrive by 8:45 AM)   Return Visit with Lorenzo Trujillo MD   [facility])    [address] [place] [address]  [address] [place] [id number] [phone]              Additional Services     Home infusion referral       [facility]  Phone  [phone]  Fax  [phone]    Resumption of home TPN management and supplies.    New referral for management and supplies of Dilaudid  PCA  Provider to manage Dilaudid PCA: Dr. Trujillo, [facility] Phone: [phone]  OK to teach patient home Dilaudid PCA bag changes    Home RN visits to resume through [facility] (Phone  [phone]; Fax  [phone])    Local Address (if different from home address): N/A    Anticipated Length of Therapy: per physician orders    Home Infusion Pharmacist to adjust therapy based on labs and clinical assessments: Yes    Labs:  May draw labs from Venous Catheter: Yes  Home Infusion Pharmacist to order labs based on therapy type and clinical assessments: Yes  Call/Fax Lab Results to: as previously in place    Agency Staff to assess nursing needs for Infusion Therapy.    Access Device Management:  IV Access Type: PICC  Flush with Heparin and Normal Saline IVP PRN and routine site care (per agency protocol) to maintain access device? Yes            Medication Therapy Management Referral       MTM referral reason            Patient had a hospital or ED visit in last 6 months and has more than 10   PTA or Discharge medications       This service is designed to help you get the most from your medications.  A specially trained pharmacist will work closely with you and your doctors  to solve any problems related to your medications and to help you get the   best results from taking them.      The Medication Therapy Management staff will call you to schedule an appointment.                  Future tests that were ordered for you     CBC with platelets differential       Last Lab Result: Hemoglobin (g/dL)       Date                     Value                 08/06/2018               8.0 (L)          ----------            Comprehensive metabolic panel           Magnesium           Phosphorus                 Further instructions from your care team       D/C'ing nurse: Please fax to following agencies at time of patient d/c.    1) [facility]      Fax  [phone]  2)  [facility]       Fax   "[phone]  ____________________________________________________________        Pending Results     No orders found from 7/22/2018 to 7/25/2018.            Statement of Approval     Ordered          08/06/18 1058  I have reviewed and agree with all the recommendations and orders detailed in this document.  EFFECTIVE NOW     Approved and electronically signed by:  Margarita Dougherty PA-C             Admission Information     Date & Time Provider Department Dept. Phone    7/24/2018 Kaden Hardin MD Unit [address] [facility] [phone]      Your Vitals Were     Blood Pressure Pulse Temperature Respirations Height Weight    124/85 (BP Location: Left arm) 102 98  F (36.7  C) (Oral) 16 1.753 m (5' 9\") 71.5 kg (157 lb 10.1 oz)    Pulse Oximetry BMI (Body Mass Index)                96% 23.28 kg/m2          MyChart Information     [employer] gives you secure access to your electronic health record. If you see a primary care provider, you can also send messages to your care team and make appointments. If you have questions, please call your primary care clinic.  If you do not have a primary care provider, please call [phone] and they will assist you.        Care EveryWhere ID     This is your Care EveryWhere ID. This could be used by other organizations to access your [facility] medical records  [id number]        Equal Access to Services     MELVIN DUNN : Hadii karrie spiveyo Soyolandaali, waaxda luqadaha, qaybta kaalmada adeegyada, bhargav willis. So [facility] [phone].    ATENCIÓN: Si habla español, tiene a cid disposición servicios gratuitos de asistencia lingüística. Llame al [phone].    We comply with applicable federal civil rights laws and [place] laws. We do not discriminate on the basis of race, color, national origin, age, disability, sex, sexual orientation, or gender identity.               Review of your medicines      START taking        Dose / Directions    " dexamethasone 4 MG tablet   Commonly known as:  DECADRON   Used for:  Colon adenocarcinoma (H), Neoplasm related pain        Dose:  4 mg   Start taking on:  8/7/2018   Take 1 tablet (4 mg) by mouth daily   Quantity:  20 tablet   Refills:  0       fentaNYL 100 mcg/hr 72 hr patch   Commonly known as:  DURAGESIC   Used for:  Colon adenocarcinoma (H), Neoplasm related pain, Peritoneal carcinomatosis (H)   Replaces:  fentaNYL 25 mcg/hr 72 hr patch        Dose:  3 patch   Start taking on:  8/7/2018   Place 3 patches onto the skin every 72 hours remove old patch.   Quantity:  10 patch   Refills:  0       order for DME   Used for:  Colon adenocarcinoma (H)        Equipment being ordered: home suction machine with tubing for connection to venting G-tube Treatment Diagnosis: colon adenocarcinoma   Quantity:  1 Device   Refills:  0       pantoprazole 40 MG EC tablet   Commonly known as:  PROTONIX   Used for:  History of recent steroid use        Dose:  40 mg   Start taking on:  8/7/2018   Take 1 tablet (40 mg) by mouth every morning (before breakfast)   Quantity:  30 tablet   Refills:  0         CONTINUE these medicines which may have CHANGED, or have new prescriptions. If we are uncertain of the size of tablets/capsules you have at home, strength may be listed as something that might have changed.        Dose / Directions    LORazepam 0.5 MG tablet   Commonly known as:  ATIVAN   This may have changed:  Another medication with the same name was removed. Continue taking this medication, and follow the directions you see here.   Used for:  Peritoneal carcinomatosis (H), Cancer of sigmoid colon (H)        Dose:  0.5 mg   Take 1 tablet (0.5 mg) by mouth every 4 hours as needed (Anxiety, Nausea/Vomiting or Sleep)   Quantity:  30 tablet   Refills:  5         CONTINUE these medicines which have NOT CHANGED        Dose / Directions    naloxone nasal spray   Commonly known as:  NARCAN   Used for:  Cancer of sigmoid colon (H), Long term  current use of opiate analgesic        Dose:  4 mg   Spray 1 spray (4 mg) into one nostril alternating nostrils as needed for opioid reversal every 2-3 minutes until assistance arrives   Quantity:  0.2 mL   Refills:  0       ondansetron 8 MG ODT tab   Commonly known as:  ZOFRAN-ODT   Used for:  Nausea        Dose:  8 mg   Take 1 tablet (8 mg) by mouth every 8 hours as needed for nausea   Quantity:  60 tablet   Refills:  5       parenteral nutrition - PTA/DISCHARGE ORDER        Inject into the vein daily FVHI   Refills:  0       sodium chloride 0.9% Soln BOLUS        Dose:  1000 mL   Inject 1,000 mLs into the vein daily as needed For hydration.   Quantity:  1000 mL   Refills:  0         STOP taking     amylase-lipase-protease 43231 units Cpep   Commonly known as:  CREON           cyanocobalamin 1000 MCG tablet   Commonly known as:  vitamin  B-12           dronabinol 5 MG capsule   Commonly known as:  MARINOL           fentaNYL 25 mcg/hr 72 hr patch   Commonly known as:  DURAGESIC   Replaced by:  fentaNYL 100 mcg/hr 72 hr patch           FentaNYL 37.5 MCG/HR Pt72           loperamide 2 MG capsule   Commonly known as:  IMODIUM           MULTIVITAMIN & MINERAL PO           NIACIN PO           omeprazole 20 MG CR capsule   Commonly known as:  priLOSEC           ondansetron 8 MG tablet   Commonly known as:  ZOFRAN           opium tincture 10 MG/ML (1%) liquid           oxyCODONE HCl 20 MG Tabs immediate release tablet   Commonly known as:  ROXICODONE           oxyCODONE IR 5 MG tablet   Commonly known as:  ROXICODONE           prochlorperazine 10 MG tablet   Commonly known as:  COMPAZINE                Where to get your medicines      These medications were sent to [facility] [place], [place] - [address]  [address] [address]     Phone:  [phone]     dexamethasone 4 MG tablet    pantoprazole 40 MG EC tablet         Some of these will need a paper prescription and  others can be bought over the counter. Ask your nurse if you have questions.     Bring a paper prescription for each of these medications     fentaNYL 100 mcg/hr 72 hr patch    order for DME                Protect others around you: Learn how to safely use, store and throw away your medicines at www.disposemymeds.org.        Information about OPIOIDS     PRESCRIPTION OPIOIDS: WHAT YOU NEED TO KNOW   We gave you an opioid (narcotic) pain medicine. It is important to manage your pain, but opioids are not always the best choice. You should first try all the other options your care team gave you. Take this medicine for as short a time (and as few doses) as possible.     These medicines have risks:    DO NOT drive when on new or higher doses of pain medicine. These medicines can affect your alertness and reaction times, and you could be arrested for driving under the influence (DUI). If you need to use opioids long-term, talk to your care team about driving.    DO NOT operate heave machinery    DO NOT do any other dangerous activities while taking these medicines.     DO NOT drink any alcohol while taking these medicines.      If the opioid prescribed includes acetaminophen, DO NOT take with any other medicines that contain acetaminophen. Read all labels carefully. Look for the word  acetaminophen  or  Tylenol.  Ask your pharmacist if you have questions or are unsure.    You can get addicted to pain medicines, especially if you have a history of addiction (chemical, alcohol or substance dependence). Talk to your care team about ways to reduce this risk.    Store your pills in a secure place, locked if possible. We will not replace any lost or stolen medicine. If you don t finish your medicine, please throw away (dispose) as directed by your pharmacist. The [place] Pollution Control Agency has more information about safe disposal: https://www.pca.state.[place]/living-green/managing-unwanted-medications.     All opioids  tend to cause constipation. Drink plenty of water and eat foods that have a lot of fiber, such as fruits, vegetables, prune juice, apple juice and high-fiber cereal. Take a laxative (Miralax, milk of magnesia, Colace, Senna) if you don t move your bowels at least every other day.              Medication List: This is a list of all your medications and when to take them. Check marks below indicate your daily home schedule. Keep this list as a reference.      Medications           Morning Afternoon Evening Bedtime As Needed    dexamethasone 4 MG tablet   Commonly known as:  DECADRON   Take 1 tablet (4 mg) by mouth daily   Start taking on:  8/7/2018   Last time this was given:  8 mg on 8/6/2018  8:25 AM                                fentaNYL 100 mcg/hr 72 hr patch   Commonly known as:  DURAGESIC   Place 3 patches onto the skin every 72 hours remove old patch.   Start taking on:  8/7/2018   Last time this was given:  3 patches on 8/4/2018 11:50 AM                                LORazepam 0.5 MG tablet   Commonly known as:  ATIVAN   Take 1 tablet (0.5 mg) by mouth every 4 hours as needed (Anxiety, Nausea/Vomiting or Sleep)                                naloxone nasal spray   Commonly known as:  NARCAN   Spray 1 spray (4 mg) into one nostril alternating nostrils as needed for opioid reversal every 2-3 minutes until assistance arrives                                ondansetron 8 MG ODT tab   Commonly known as:  ZOFRAN-ODT   Take 1 tablet (8 mg) by mouth every 8 hours as needed for nausea                                order for DME   Equipment being ordered: home suction machine with tubing for connection to venting G-tube Treatment Diagnosis: colon adenocarcinoma                                pantoprazole 40 MG EC tablet   Commonly known as:  PROTONIX   Take 1 tablet (40 mg) by mouth every morning (before breakfast)   Start taking on:  8/7/2018   Last time this was given:  40 mg on 8/6/2018  8:25 AM                                 parenteral nutrition - PTA/DISCHARGE ORDER   Inject into the vein daily FVHI                                sodium chloride 0.9% Soln BOLUS   Inject 1,000 mLs into the vein daily as needed For hydration.   Last time this was given:  1,000 mLs on 8/6/2018  8:16 AM

## 2018-07-24 NOTE — IP AVS SNAPSHOT
"    UNIT [facility]: [phone]                                              INTERAGENCY TRANSFER FORM - PHYSICIAN ORDERS   2018                    Hospital Admission Date: 2018  KARINA MINER   : 1965  Sex: Male        Attending Provider: Kaden Hardin MD     Allergies:  Liquid Adhesive    Infection:  None   Service:  Hem/Onc    Ht:  1.753 m (5' 9\")   Wt:  71.5 kg (157 lb 10.1 oz)   Admission Wt:  66.1 kg (145 lb 10.1 oz)    BMI:  23.28 kg/m 2   BSA:  1.87 m 2            Patient PCP Information     Provider PCP Type    NGUYỄN HERNÁNDEZ MD General      ED Clinical Impression     Diagnosis Description Comment Added By Time Added    Colon adenocarcinoma (H) [C18.9] Colon adenocarcinoma (H) [C18.9]  Karime Junior, RN 2018  9:29 AM    Small bowel obstruction [K56.609] Small bowel obstruction [K56.609]  Karime Junior RN 2018  9:29 AM    Peritoneal carcinomatosis (H) [C78.6, C80.1] Peritoneal carcinomatosis (H) [C78.6, C80.1]  Laure Gr APRN CNP 8/3/2018  1:06 PM    Neoplasm related pain [G89.3] Neoplasm related pain [G89.3]  Laure Gr APRN CNP 8/3/2018  1:06 PM    Cancer of sigmoid colon (H) [C18.7] Cancer of sigmoid colon (H) [C18.7]  Laure Gr APRN CNP 2018 12:05 PM    History of recent steroid use [Z92.241] History of recent steroid use [Z92.241]  Margarita Dougherty PA-C 2018 10:52 AM      Hospital Problems as of 2018              Priority Class Noted POA    Small bowel obstruction Medium  2018 Yes      Non-Hospital Problems as of 2018              Priority Class Noted    Peritoneal carcinomatosis (H) Medium  8/3/2017    Cancer of sigmoid colon (H) Medium  8/3/2017    Iron deficiency anemia due to chronic blood loss Medium  2017    Diarrhea, unspecified type Medium  2017    Hypokalemia Medium  2017    Large bowel obstruction Medium  2018    Colon cancer (H) Medium  2018    Colitis " presumed infectious Medium  2/20/2018    Bacteremia Medium  2/25/2018    Colon adenocarcinoma (H) Medium  7/3/2018    Neoplasm related pain Medium  7/20/2018      Code Status History     Date Active Date Inactive Code Status Order ID Comments User Context    8/6/2018 10:54 AM  Full Code [id number]  Margarita Dougherty PA-C Outpatient    7/24/2018  7:04 PM 8/6/2018 10:54 AM Full Code [id number]  Chio Ochoa MD Inpatient    7/8/2018 12:19 PM 7/24/2018  7:04 PM Full Code [id number]  Margarita Dougherty PA-C Outpatient    7/3/2018  4:01 PM 7/8/2018 12:19 PM Full Code [id number]  Margarita Dougherty PA-C Inpatient    3/20/2018  1:29 PM 7/3/2018  4:01 PM Full Code [id number]  Ericka Lentz PA-C Outpatient    3/18/2018 10:24 AM 3/20/2018  1:29 PM Full Code [id number]  Stephon Mason MD Inpatient    2/20/2018  4:47 PM 2/25/2018  3:48 PM Full Code [id number]  Ericka Lentz PA-C Inpatient    2/9/2018 11:18 AM 2/20/2018  4:47 PM Full Code [id number]  Es Dumont APRN CNP Outpatient    2/5/2018 11:19 AM 2/9/2018 11:18 AM Full Code [id number]  Es Dumont APRN CNP Inpatient    12/7/2017  2:12 PM 2/5/2018 11:19 AM Full Code [id number]  Freddie Tadeo MD Outpatient    12/6/2017 11:44 AM 12/7/2017  2:12 PM Full Code [id number]  Rob Dudley MD Inpatient         Medication Review      START taking        Dose / Directions Comments    dexamethasone 4 MG tablet   Commonly known as:  DECADRON   Used for:  Colon adenocarcinoma (H), Neoplasm related pain        Dose:  4 mg   Start taking on:  8/7/2018   Take 1 tablet (4 mg) by mouth daily   Quantity:  20 tablet   Refills:  0        fentaNYL 100 mcg/hr 72 hr patch   Commonly known as:  DURAGESIC   Used for:  Colon adenocarcinoma (H), Neoplasm related pain, Peritoneal carcinomatosis (H)   Replaces:  fentaNYL 25 mcg/hr 72 hr patch        Dose:  3 patch   Start taking on:  8/7/2018   Place 3 patches onto the skin every 72 hours remove old  patch.   Quantity:  10 patch   Refills:  0        order for DME   Used for:  Colon adenocarcinoma (H)        Equipment being ordered: home suction machine with tubing for connection to venting G-tube Treatment Diagnosis: colon adenocarcinoma   Quantity:  1 Device   Refills:  0        pantoprazole 40 MG EC tablet   Commonly known as:  PROTONIX   Used for:  History of recent steroid use        Dose:  40 mg   Start taking on:  8/7/2018   Take 1 tablet (40 mg) by mouth every morning (before breakfast)   Quantity:  30 tablet   Refills:  0          CONTINUE these medications which may have CHANGED, or have new prescriptions. If we are uncertain of the size of tablets/capsules you have at home, strength may be listed as something that might have changed.        Dose / Directions Comments    LORazepam 0.5 MG tablet   Commonly known as:  ATIVAN   This may have changed:  Another medication with the same name was removed. Continue taking this medication, and follow the directions you see here.   Used for:  Peritoneal carcinomatosis (H), Cancer of sigmoid colon (H)        Dose:  0.5 mg   Take 1 tablet (0.5 mg) by mouth every 4 hours as needed (Anxiety, Nausea/Vomiting or Sleep)   Quantity:  30 tablet   Refills:  5          CONTINUE these medications which have NOT CHANGED        Dose / Directions Comments    naloxone nasal spray   Commonly known as:  NARCAN   Used for:  Cancer of sigmoid colon (H), Long term current use of opiate analgesic        Dose:  4 mg   Spray 1 spray (4 mg) into one nostril alternating nostrils as needed for opioid reversal every 2-3 minutes until assistance arrives   Quantity:  0.2 mL   Refills:  0        ondansetron 8 MG ODT tab   Commonly known as:  ZOFRAN-ODT   Used for:  Nausea        Dose:  8 mg   Take 1 tablet (8 mg) by mouth every 8 hours as needed for nausea   Quantity:  60 tablet   Refills:  5        parenteral nutrition - PTA/DISCHARGE ORDER        Inject into the vein daily FVHI   Refills:  0         sodium chloride 0.9% Soln BOLUS        Dose:  1000 mL   Inject 1,000 mLs into the vein daily as needed For hydration.   Quantity:  1000 mL   Refills:  0          STOP taking     amylase-lipase-protease 24756 units Cpep   Commonly known as:  CREON           cyanocobalamin 1000 MCG tablet   Commonly known as:  vitamin  B-12           dronabinol 5 MG capsule   Commonly known as:  MARINOL           fentaNYL 25 mcg/hr 72 hr patch   Commonly known as:  DURAGESIC   Replaced by:  fentaNYL 100 mcg/hr 72 hr patch           FentaNYL 37.5 MCG/HR Pt72           loperamide 2 MG capsule   Commonly known as:  IMODIUM           MULTIVITAMIN & MINERAL PO           NIACIN PO           omeprazole 20 MG CR capsule   Commonly known as:  priLOSEC           ondansetron 8 MG tablet   Commonly known as:  ZOFRAN           opium tincture 10 MG/ML (1%) liquid           oxyCODONE HCl 20 MG Tabs immediate release tablet   Commonly known as:  ROXICODONE           oxyCODONE IR 5 MG tablet   Commonly known as:  ROXICODONE           prochlorperazine 10 MG tablet   Commonly known as:  COMPAZINE                     Further instructions from your care team       D/C'ing nurse: Please fax to following agencies at time of patient d/c.    1) [facility]      Fax  [phone]  2)  [facility]       Fax  [phone]  ____________________________________________________________        Summary of Visit     Reason for your hospital stay       Admitted with nausea, vomiting and increased abdominal pain.             After Care     Activity       Your activity upon discharge: activity as tolerated       Diet       Follow this diet upon discharge: Orders Placed This Encounter      Full Liquid Diet & TPN support       Discharge Instructions       If questions or problems arise regarding tube function (e.g. leaking, dislodges, etc.) Contact Interventional Radiology department 24 hours a day.    For procedures that were done at the [facility]  [facility],   8:00-4:30 PM Monday through Friday    Contact:[phone].    For afterhours and weekends call the [facility] main phone line [phone] and ask for the [facility] IR on call physician number.    If DIRECTED by the RADIOLOGIST, related to specific problems with the tube functioning,  go to the Emergency Department.       Discharge Instructions       Patient to make a follow up appointment in IR clinic in 10-14 days for the removal of the retention sutures at the G or GJ tube site. Phone number is [phone] if needed.             Referrals     Home infusion referral       [facility]  Phone  [phone]  Fax  [phone]    Resumption of home TPN management and supplies.    New referral for management and supplies of Dilaudid PCA  Provider to manage Dilaudid PCA: Dr. Trujillo, [facility] Phone: [phone]  OK to teach patient home Dilaudid PCA bag changes    Home RN visits to resume through [facility] (Phone  [phone]; Fax  [phone])    Local Address (if different from home address): N/A    Anticipated Length of Therapy: per physician orders    Home Infusion Pharmacist to adjust therapy based on labs and clinical assessments: Yes    Labs:  May draw labs from Venous Catheter: Yes  Home Infusion Pharmacist to order labs based on therapy type and clinical assessments: Yes  Call/Fax Lab Results to: as previously in place    Agency Staff to assess nursing needs for Infusion Therapy.    Access Device Management:  IV Access Type: PICC  Flush with Heparin and Normal Saline IVP PRN and routine site care (per agency protocol) to maintain access device? Yes       Medication Therapy Management Referral       MTM referral reason            Patient had a hospital or ED visit in last 6 months and has more than 10   PTA or Discharge medications       This service is designed to help you get the most from your medications.  A specially trained pharmacist will work  closely with you and your doctors  to solve any problems related to your medications and to help you get the   best results from taking them.      The Medication Therapy Management staff will call you to schedule an appointment.             Lab Orders     CBC with platelets differential       Last Lab Result: Hemoglobin (g/dL)       Date                     Value                 08/06/2018               8.0 (L)          ----------       Comprehensive metabolic panel           Magnesium           Phosphorus                 Your next 10 appointments already scheduled     Aug 09, 2018  3:30 PM CDT   [facility]   [facility] Lab Draw ([facility])    [address] [place] [address]  [address] [place] [id number]   [phone]            Aug 09, 2018  4:00 PM CDT   (Arrive by 3:45 PM)   Return Visit with [facility] Lnog MD   [facility] ([facility])    [address] [place] [address]  [address]  [facility] [address]   [phone]            Aug 15, 2018  9:00 AM CDT   (Arrive by 8:45 AM)   Return Visit with Lorenzo Trujillo MD   [facility])    [address] [place] [address]  [address] [place] [id number]   [phone]              Follow-Up Appointment Instructions     Future Labs/Procedures    Follow Up and recommended labs and tests     Comments:    Follow up as scheduled:      Follow-Up Appointment Instructions     Follow Up and recommended labs and tests       Follow up as scheduled:             Statement of Approval     Ordered          08/06/18 1058  I have reviewed and agree with all the recommendations and orders detailed in this document.  EFFECTIVE NOW     Approved and electronically signed by:  Margarita Dougherty PA-C

## 2018-07-24 NOTE — PLAN OF CARE
Nursing Focus: Admission    D: Arrived at 1740 from clinic. Admitted for abdominal pain, nausea/vomiting, abnormal electrolytes.     I: Admission process began.  Patient oriented to room, enviroment, call light.  Md. notified of patients arrival on unit.     A: Vital signs stable, afebrile.  Patient stable at this time.  Complaining of abdominal discomfort.     P: Implement plan of care when available. Continue to monitor patient. Nursing interventions as appropriate. Notify md with changes in pt status.

## 2018-07-24 NOTE — LETTER
Transition Communication Hand-off for Care Transitions to Next Level of Care Provider    Name: Sebas Lopez  : 1965  MRN #: [id number]  Primary Care Provider: NGUYỄN HERNÁNDEZ     Primary Clinic: [facility] [address]  [place] [address]     Reason for Hospitalization:  Nausea, Vomiting  PAULINA  Small bowel obstruction  Admit Date/Time: 2018  5:40 PM  Discharge Date:          Reason for Communication Hand-off Referral: Patient of Dr Long    Discharge Plan: * s/p venting G-tube this admission; also going home on Dilaudid PCA for pain control  * resumption of home TPN       Concern for non-Follow-up plan:  Future Appointments  Date Time Provider Department Center   2018 3:30 PM  [facility] DRAW [facility]   2018 4:00 PM Albert Long MD [facility]       Any outstanding tests or procedures:        Referrals     Future Labs/Procedures    Home infusion referral     Comments:    [facility]  Phone  [phone]  Fax  [phone]    Resumption of home TPN management and supplies.  New referral for management and supplies of Dilaudid PCA    Home RN visits to resume through [facility] (Phone  [phone]; Fax  [phone])    Local Address (if different from home address): N/A    Anticipated Length of Therapy: per physician orders    Home Infusion Pharmacist to adjust therapy based on labs and clinical assessments: Yes    Labs:  May draw labs from Venous Catheter: Yes  Home Infusion Pharmacist to order labs based on therapy type and clinical assessments: Yes  Call/Fax Lab Results to: as previously in place    Agency Staff to assess nursing needs for Infusion Therapy.    Access Device Management:  IV Access Type: PICC  Flush with Heparin and Normal Saline IVP PRN and routine site care (per agency protocol) to maintain access device? Yes    Medication Therapy Management Referral     Comments:    MTM referral reason            Patient had a hospital or ED visit in  last 6 months and has more than 10   PTA or Discharge medications       This service is designed to help you get the most from your medications.  A specially trained pharmacist will work closely with you and your doctors  to solve any problems related to your medications and to help you get the   best results from taking them.      The Medication Therapy Management staff will call you to schedule an appointment.

## 2018-07-24 NOTE — PROGRESS NOTES
[facility]  MEDICAL ONCOLOGY PROGRESS NOTE  July 20, 2018    CHIEF COMPLAINT: Sigmoid adenocarcinoma with peritoneal carcinomatosis.     ONCOLOGY HPI:   Sebas is a 53-year-old male who has a history of ulcerative colitis diagnosed more than 20 years ago, but he has not had medical management for that.  He was doing well, but in 05/2017 he presented with a few weeks of abdominal pain.  At that time, his imaging showed that he had a sigmoid wall thickening with adjacent mesenteric lymphadenopathy and free fluid near the abdominal wall mesh from his prior hernia surgery.  He subsequently underwent a colonoscopy which was incomplete and showed an obstructing sigmoid colon mass.  The biopsy from the sigmoid mass showed sigmoid adenocarcinoma.  He then developed obstructive symptoms and underwent exploratory laparotomy on 07/10/2017.  During that he was found to have diffuse peritoneal carcinomatosis.  Extensive lysis of adhesions was performed and a small bowel resection was performed with end ileostomy.  The biopsies from the multiple large peritoneal metastasis also were positive for metastatic adenocarcinoma.       He started palliative FOLFOX on 8/11/17. C#6 given on 10/26/17  After 6 cycles, he has stable disease     On 12/6/17, he had Diagnostic laparoscopy and Exploratory laparotomy, but HIPEC was not performed as Peritoneal cancer index was 26 with diffuse involvement of the small bowel as well as the small bowel mesentery.      He then presented to ED on 1/26 with abdominal pain and associated nausea. CT A/P on 1/26 with 5.2 x 4.5 x 3.6 cm proximal sigmoid mass causnig colonic obstriction with singificant distention of cecum (7cm in diameter), ascending colon and proximal transverse colon. No pneumatosis or pneumoperitoneum. Also small volume of ascites with associated findings concerning for peritoneal carcinomatosis, increased from prior studies. Dr. Dudley was consulted and recommended no  surgical intervention.     He was then seen in clinic on 1/29/2018 as well as yesterday because for the last few days he was unable to tolerate solid foods and he was getting abdominal cramping and some nausea. He also noticed that his ostomy output decreased. He was given IV fluids as well as antibiotics and yesterday he was started on OxyContin 10 mg twice a day along with p.r.n. oxycodone. Of note a few weeks ago he was started on short acting octreotide to decrease the ostomy output but he stopped taking it about one week ago and few days prior to that he noticed worsening of the abdominal cramping.     Repeat CT scan showed worsening peritoneal carcinomatosis and he is getting more symptomatic in terms of worsening abdominal pain and difficulty eating because of worsening abdominal cramping. He was admitted to the hospital with worsening colon distention and colon stent was placed which improved his symptoms.      He was recently hospitalized from 2/20-2/25/18 with presumed infectious colitis and bacteremia. His port was removed. He was discharged home on IV vancomycin via a PICC line which he completed on 3/7/2018.      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 He was hospitalized with a bowel obstruction from 3/18-3/20/18 and a colonic stent was placed on 3/19/18.  C#8 3/22/18 FOLFOX  C#9 4/5/18 FOLFOX        Repeat CT shows slightly improved disease and less ascites now.   Because of progressive fatigue, we stopped the 5-FU bolus and leucovorin and continued with the 5-FU infusion and oxaliplatin.      C#10 5/4/18  C#11 5/18/18  C#13 5/31/18  C#13 6/15/18     Repeat CT scan showed worsening peritoneal disease. There is also increased fluid in the proximal control on as well as post stent fluid in the rectum.     Plan was to switch therapy to irinotecan and panitumumab though was hospitalized on 7/3 for abdominal pain and bloating as well as rectal bleeding.  Found to have colitis with increased fluid dilation of the colon. GI did a flex sig and placed a colon stent for the third time. Other stents were found to have ingrowth of tumor. He was discharged on oxycodone 15-25 mg q4hr and his fentanyl patch (25 mcg/hr) and cipro and flagyl until 7/14.     INTERVAL HISTORY:   Sebas is seen in infusion today. He was seen in clinic on 7/20 and at that time had been having more abdominal pain/distention but no vomiting. Yesterday he called triage to state he had been having nausea/vomiting over the weekend and that he felt dehydrated. Today he returns for IVF/anti-emetics. His weight is down 11 lbs since last visit. He started vomiting about 3 days ago, about 3-4 times daily. He tried compazine instead of Zofran which seemed to initially to help but he has still been vomiting. Not doing any po intake aside from some sips of fluid/gingerale. He increased IVF at home to 1L daily since last visit, but has only given himself 500 ml so far today. TPN volume currently is about 2400cc he tells me.    Last visit he told me ostomy output had declined but he was unsure of amount. Today he states recently ostomy output about the same, with green, mostly liquid discharge. No rectal output in past few days. Pain on center/left side of abdomen a little worse, but relieved by vomiting. Still using fentanyl 25mcg patch, has not started 37mcg patch as he wanted to finish the 25mcg patches first. Taking oxycodone 30 mg about 5-6 times daily. No fevers, chills. He is voiding without difficulty.   Remaining ROS negative      Allergies   Allergen Reactions     Liquid Adhesive Rash     Dermabond, rash with pustules, occurred with abdominal surgery and port removal        Current Outpatient Prescriptions   Medication     amylase-lipase-protease (CREON) 85255 UNITS CPEP     cyanocobalamin (VITAMIN  B-12) 1000 MCG tablet     dronabinol (MARINOL) 5 MG capsule     fentaNYL (DURAGESIC) 25 mcg/hr 72 hr patch      FentaNYL 37.5 MCG/HR PT72     loperamide (IMODIUM) 2 MG capsule     LORazepam (ATIVAN) 0.5 MG tablet     LORazepam (ATIVAN) 0.5 MG tablet     metroNIDAZOLE (FLAGYL) 500 MG tablet     Multiple Vitamins-Minerals (MULTIVITAMIN & MINERAL PO)     naloxone (NARCAN) nasal spray     NIACIN PO     omeprazole (PRILOSEC) 20 MG CR capsule     ondansetron (ZOFRAN) 8 MG tablet     ondansetron (ZOFRAN-ODT) 8 MG ODT tab     opium tincture 10 MG/ML (1%) liquid     oxyCODONE IR (ROXICODONE) 20 MG TABS immediate release tablet     oxyCODONE IR (ROXICODONE) 5 MG tablet     parenteral nutrition - PTA/DISCHARGE ORDER     prochlorperazine (COMPAZINE) 10 MG tablet     sodium chloride 0.9% SOLN BOLUS     Current Facility-Administered Medications   Medication     sodium chloride (PF) 0.9% PF flush 20 mL     Facility-Administered Medications Ordered in Other Visits   Medication     0.9% sodium chloride BOLUS     fosaprepitant (EMEND) 150 mg in sodium chloride 0.9 % intermittent infusion       EXAM:  /85 (BP Location: Right arm, Patient Position: Sitting, Cuff Size: Adult Regular)  Pulse 115  Temp 97.5  F (36.4  C) (Oral)  Resp 16  Wt 65 kg (143 lb 6.4 oz)  SpO2 98%  BMI 21.18 kg/m2  Wt Readings from Last 4 Encounters:   07/24/18 65 kg (143 lb 6.4 oz)   07/20/18 69.9 kg (154 lb 3.2 oz)   07/10/18 73 kg (161 lb)   07/08/18 74.7 kg (164 lb 9.6 oz)        General: Appears fatigued, in no acute distress  HEENT: EOMI, PERRL, no scleral icterus, mucus membranes moist and no lesions or thrush  Lymph: Neck is supple and shows no cervical or supraclavicular JOHNNY  Heart:  RRR, no murmur, gallop or rub  Lungs: CTA-B, no wheezes, rhonchi or rales  Abdomen: Normal bowel sounds, soft, central abdominal tenderness with central firmness. Ostomy in place with green liquid stool.  Extremities: No edema in bilateral lower extremitites  Skin: No significant rashes or lesions  Neuro: CN II-XII are intact    LABS:   Results for ISIDRA, KARINA SPIKE  (MRN [id number]) as of 7/24/2018 14:39   7/24/2018 13:33   Sodium 123 (L)   Potassium 3.6   Chloride 80 (L)   Carbon Dioxide 30   Urea Nitrogen 97 (H)   Creatinine 2.03 (H)   GFR Estimate 34 (L)   GFR Estimate If Black 41 (L)   Calcium 9.6   Anion Gap 13   Magnesium 3.0 (H)   Phosphorus 5.5 (H)   Albumin 3.2 (L)   Protein Total 10.7 (H)   Bilirubin Total 0.4   Alkaline Phosphatase 169 (H)   ALT 17   AST 17   Glucose 138 (H)   WBC 16.7 (H)   Hemoglobin 11.6 (L)   Hematocrit 34.8 (L)   Platelet Count 704 (H)   RBC Count 4.24 (L)   MCV 82   MCH 27.4   MCHC 33.3   RDW 16.8 (H)   Diff Method Automated Method   % Neutrophils 66.7   % Lymphocytes 13.3   % Monocytes 15.9   % Eosinophils 0.2   % Basophils 0.7   % Immature Granulocytes 3.2   Nucleated RBCs 0   Absolute Neutrophil 11.1 (H)   Absolute Lymphocytes 2.2   Absolute Monocytes 2.7 (H)   Absolute Eosinophils 0.0   Absolute Basophils 0.1   Abs Immature Granulocytes 0.5 (H)   Absolute Nucleated RBC 0.0     IMAGING:   CT A/P pending    IMPRESSION/PLAN:    Nausea/vomiting, increased abdominal pain.  He reports increased abdominal pain that started last week in setting of peritoneal carcinomatosis and history of colonic obstruction. Last hospitalization he had colon stented by GI for third time. Now with nausea/vomiting and increased abdominal pain, concerning for obstruction. Afebrile. Leukocytosis.   --IVF NS, IV Zofran, emend  --CT A/P w/o contrast due to PAULINA  --Check ostomy output for c diff, enteric panel  --Spoke with Dr. Long, will admit to hospital  --Pain: last seen by Dr. Trujillo on 7/20. Plan to increase fentanyl patch to 37mcg/h but Sebas has not increased this yet. Oxy increased to 20-30 mg q4h prn. 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  The sigmoid carcinoma is obstructing, requiring exploratory laparotomy and end ileostomy along with small bowel resection.  MSI  intact and NGS panel does not reveal any identifiable mutations. He was previously on 5-FU infusions and oxaliplatin, though CT showed worsening disease. Was due to switch therapy to irinotecan and panitumumab though was hospitalized on 7/3 for abdominal pain and bloating as well as rectal bleeding. Found to have colitis with increased fluid dilation of the colon. GI did a flex sig and placed a colon stent for the third time. Other stents were found to have ingrowth of tumor.   -Scheduled to start chemotherapy last week but he cancelled as was not feeling well. He then had concerns about irinotecan, panitumumab. [facility] handouts provided and we discussed possible side effects. Plan was to start this Friday     PAULINA. Cr up to 2.03 from 0.85 on 7/20. Likely due to dehydration despite TPN and IVF at home. He denies dysuria, oliguria.     FEN.   -Patient remains on 12 hours TPN/day but now with no po intake aside from some fluids. Spoke with [facility] home infusion last visit to make sure his TPN is currently meeting 100% of his nutritional needs.     Hyponatremia. sodium 123 today, decreased from 130 on 7/20. BUN/Cr ratio elevated. Likely 2/2 to hypovolemia. Despite home IVF increased to 1L NS daily in addition to TPN, sodium has continued to drop.     High output ostomy.   Not using opium tincture currently. He is not sure about output quantity.     History of iron deficient anemia.   Hemoglobin was low in the hospital 2/2 rectal bleeding and needed a transfusion on 7/6. He is currently off iron due to nausea/vomiting    Jacque Dale PA-C  [facility]  [address] [place] [address] [phone]

## 2018-07-24 NOTE — H&P
[facility] History and Physical    Sebas Lopez MRN# [id number]   Age: 53 year old YOB: 1965     Date of Admission:  7/24/2018    Home clinic: [facility]  Primary care provider: Jaguar Becker          Assessment and Plan (Resident / Clinician):     54yo M with h/o UC, s/p ileostomy  7/2017, and sigmoid adenocarcinoma with peritoneal carcinomatosis s/p colonic stent 3/19. Last cycle of chemo was 6/15, plan to continue therapy and switch to irinotecan and panitumumab but has not been able to do this yet due to hospitalization for abdominal pain and bloating.     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     Currently unclear cause for the obstruction, though disease progression is very likely. Otherwise colonic stents are in place and other intra-abdominal findings are mostly stable. Medical management for now but he may need either GI or general surgery to evaluate     - place NGT to LIS for decompression   - NPO   - ostomy output negative for c diff  - IV zofran, compazine PRN    Pain:     - ct fentanyl patch at 25mcg q72hrs   - with NGT to LIS, will not be able to take PO pain meds, so oral oxycodone converted with 25% dose reduction to IV hydromorphone--will need reassessment of control   - following with Palliative Care as outpatient. Consider c/s if pain not improved significantly with NGT decompression. Also currently has plan to increase his fentanyl patch to 37mcg, which he hasn't done yet     # Pain Assessment:  Current Pain Score 7/24/2018   Patient currently in pain? yes   Pain score (0-10) -   Pain location Abdomen   Pain descriptors Discomfort   - Sebas is experiencing pain due to cancer, bowel obstruction. Pain management was discussed and the plan was created in a collaborative fashion.  Sebas's response to the current recommendations: engaged  - Please see the plan for pain  management as documented above    PAULINA: likely prerenal azotemia due to dehydration. 1L bolus given in clinic. No signs of obstruction on CT. High Mg, phos, BUN, protein likely related to PAULINA and reduced filtration function   FEN:    - NPO  - TPN consult --getting 2400cc/24 hrs   - mIVF @100cc/hr    Hyponatremia: likely due to vomiting, dehydration. Acute to subacute. Gentle rehydration, repeat BMP at [place].     - urine and serum osms, Na    Leukocytosis: with no other sxs, and no fever. Also mildly tachycardic. Likely systemic inflammation from cancer though his baseline white count is usually not high. Low threshold to start abx     prophylaxis: [facility]   Code status: full code   Access: PICC            Chief Complaint:   Abdominal pain, n/v    History is obtained from the patient         History of Present Illness:   This patient is a 53 year old male with h/o UC, s/p ileostomy  7/2017, and sigmoid adenocarcinoma with peritoneal carcinomatosis s/p colonic stent 3/19. Last cycle of chemo was 6/15, plan to continue therapy and switch to irinotecan and panitumumab but has not been able to do this yet due to hospitalization for abdominal pain and bloating. He has had increased abdominal pain with nausea and vomiting over the last 3-4 days. He is at baseline TPN dependent for nutrition, though he does take in PO as tolerated. He came to clinic for evaluation of the n/v and abdominal pain and was found to have an PAULINA. CT abdomen/pelvis also showed small bowel obstruction. He was admitted for further management.     He started having worse abdominal pain and distention 7/20 and over the weekend started vomiting 3-4 times a day, non bloody, non bilious, though ominously the emesis has looked like his ostomy output. He feels a lot better after vomiting. Pain is diffuse, worst on the left side, and feels like pressure. Oral antiemetics at home have not helped with n/v. No fevers, no rash, no sick contacts.    He has had normal  ostomy output in the last week. Baseline is mostly liquid, brown. No blood in ostomy output. No rectal output in the last few days. Has been on fentanyl 25mcg patch and taking oxycodone 30mg about every 4 hours at home. No hematuria, no dysuria, and urine output has been normal. He has lost about 11# since his last clinic visit.          Cancer Treatment History:   See today's onc clinic note         Past Medical History:     Past Medical History:   Diagnosis Date     Adenocarcinoma of sigmoid colon (H)     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     Ulcerative colitis (H)             Past Surgical History:      Past Surgical History:   Procedure Laterality Date     COLONOSCOPY  2017     COLONOSCOPY N/A 11/30/2017    Procedure: COLONOSCOPY;  Colonoscopy;  Surgeon: Rob Dudley MD;  Location: [facility]     COLONOSCOPY N/A 2/6/2018    Procedure: COMBINED COLONOSCOPY, STENT PLACEMENT;  COMBINED COLONOSCOPY with Colonic Stent Placement;  Surgeon: Guru Lionel Mar MD;  Location: [facility] OR     COLONOSCOPY N/A 3/19/2018    Procedure: COMBINED COLONOSCOPY, STENT PLACEMENT;  flexible sigmoidoscopy with stent placement and dilation;  Surgeon: Alexis Rios MD;  Location: [facility]     COLONOSCOPY N/A 7/5/2018    Procedure: COMBINED COLONOSCOPY, STENT PLACEMENT;  flexible sigmoidoscopy with colonic stent placement ;  Surgeon: Alexis Rios MD;  Location: [facility] OR     GI SURGERY  07/10/2017    Extensive lysis of adhesions, small bowel resection with end ileostomy.      HERNIA REPAIR       INSERT PORT VASCULAR ACCESS Right 8/10/2017    Procedure: INSERT PORT VASCULAR ACCESS;  Single Lumen Right Chest Power Port;  Surgeon: Malena Andrew PA-C;  [facility]     LAPAROSCOPY DIAGNOSTIC  (GENERAL) N/A 12/6/2017    Procedure: LAPAROSCOPY DIAGNOSTIC (GENERAL);  Diagnostic Laparoscopy, Exploratory Laparotomy Anesthesia Block ;  Surgeon: Rob Dudley MD;  Location: [facility]     LAPAROTOMY EXPLORATORY N/A 12/6/2017    Procedure: LAPAROTOMY EXPLORATORY;;  Surgeon: Rob Dudley MD;  Location: [facility] OR     ORTHOPEDIC SURGERY Left     HIP ARTHROPLASTY     PICC INSERTION Right 02/23/2018    5Fr DL BioFlo PICC, 44cm (3cm external) in the R basilic vein w/ tip in the SVC RA junction            Social History:     Social History   Substance Use Topics     Smoking status: Never Smoker     Smokeless tobacco: Never Used     Alcohol use 3.0 oz/week     5 Cans of beer per week      Comment: none for last 4 months            Family History:     Family History   Problem Relation Age of Onset     Hypertension Father      Diabetes Father      Family history reviewed and updated in EPIC         Allergies:     Allergies   Allergen Reactions     Liquid Adhesive Rash     Dermabond, rash with pustules, occurred with abdominal surgery and port removal             Medications:     Prescriptions Prior to Admission   Medication Sig Dispense Refill Last Dose     amylase-lipase-protease (CREON) 47573 UNITS CPEP Take 2 capsules (72,000 Units) by mouth 3 times daily (with meals) And 1 capsule with each snack tid. 240 capsule 3 Past Week at Unknown time     cyanocobalamin (VITAMIN  B-12) 1000 MCG tablet Take 1,000 mcg by mouth daily   Past Week at Unknown time     dronabinol (MARINOL) 5 MG capsule Take 1 capsule (5 mg) by mouth 2 times daily (before meals) 60 capsule 0 Past Month at Unknown time     fentaNYL (DURAGESIC) 25 mcg/hr 72 hr patch Place 1 patch onto the skin every 72 hours remove old patch. 1 patch 0 7/24/2018 at Unknown time     LORazepam (ATIVAN) 0.5 MG tablet Take 1 tablet (0.5 mg) by mouth every 4 hours as needed (Anxiety, Nausea/Vomiting or Sleep) 30 tablet 2 Past Week at Unknown time     LORazepam  (ATIVAN) 0.5 MG tablet Take 1 tablet (0.5 mg) by mouth every 4 hours as needed (Anxiety, Nausea/Vomiting or Sleep) 30 tablet 5 Past Week at Unknown time     Multiple Vitamins-Minerals (MULTIVITAMIN & MINERAL PO) Take 1 tablet by mouth daily   Past Week at Unknown time     NIACIN PO Take by mouth daily   Past Week at Unknown time     omeprazole (PRILOSEC) 20 MG CR capsule Take 1 capsule (20 mg) by mouth daily 30 capsule 11 7/24/2018 at Unknown time     ondansetron (ZOFRAN-ODT) 8 MG ODT tab Take 1 tablet (8 mg) by mouth every 8 hours as needed for nausea 60 tablet 5 7/24/2018 at Unknown time     opium tincture 10 MG/ML (1%) liquid Take 0.6 mLs (6 mg) by mouth 4 times daily 72 mL 0 Past Week at Unknown time     oxyCODONE IR (ROXICODONE) 20 MG TABS immediate release tablet Take 20-30 mg by mouth every 4 hours as needed for moderate to severe pain Take 20-30 mg every 4 hours as needed for moderate to severe pain 180 tablet 0 7/24/2018 at Unknown time     oxyCODONE IR (ROXICODONE) 5 MG tablet Take 15-25 mg q4 hrs as needed for moderate to severe pain (can take with 10 mg tabs of previous prescription to make 15 mg or 25 mg doses). 20 tablet 0 Past Month at Unknown time     parenteral nutrition - PTA/DISCHARGE ORDER Inject into the vein daily [facility]   7/24/2018 at Unknown time     prochlorperazine (COMPAZINE) 10 MG tablet Take 1 tablet (10 mg) by mouth every 6 hours as needed (Nausea/Vomiting) 30 tablet 2 7/23/2018 at Unknown time     sodium chloride 0.9% SOLN BOLUS Inject 1,000 mLs into the vein daily as needed For hydration. 1000 mL 0 7/24/2018 at Unknown time     FentaNYL 37.5 MCG/HR PT72 Place 37 mcg/hr onto the skin every 72 hours 10 patch 0      loperamide (IMODIUM) 2 MG capsule 2 caps at 1st sign of diarrhea & 1 cap every 2hrs until 12hrs diarrhea free. During night, 2 caps at bedtime & 2 caps every 4hrs until AM (Patient not taking: Reported on 7/20/2018) 30 capsule 0 More than a month at Unknown time     naloxone  (NARCAN) nasal spray Spray 1 spray (4 mg) into one nostril alternating nostrils as needed for opioid reversal every 2-3 minutes until assistance arrives (Patient not taking: Reported on 7/10/2018) 0.2 mL 0 Unknown at Unknown time     ondansetron (ZOFRAN) 8 MG tablet Take 1 tablet (8 mg) by mouth every 8 hours as needed (Nausea/Vomiting) 10 tablet 2 Unknown at Unknown time            Review of Systems:   The 10 point Review of Systems is negative other than noted in the HPI         Physical Exam:   Temp: 96  F (35.6  C) Temp src: Oral BP: 99/69 Pulse: 104   Resp: 16 SpO2: 94 % O2 Device: None (Room air)    Constitutional:   awake, alert, cooperative, no apparent distress, and appears thin, cachectic   Eyes:   Lids and lashes normal, pupils equal, round and reactive to light, extra ocular muscles intact, sclera clear, conjunctiva normal   ENT:   Normocephalic, without obvious abnormality, atraumatic, sinuses nontender on palpation, external ears without lesions, oral pharynx with moist mucous membranes, tonsils without erythema or exudates, gums normal and good dentition.   Neck:   supple, symmetrical, trachea midline   Hematologic / Lymphatic:   no cervical lymphadenopathy   Back:   Symmetric, no curvature    Lungs:   No increased work of breathing, good air exchange, clear to auscultation bilaterally, no crackles or wheezing   Cardiovascular:   Normal apical impulse, regular rate and rhythm, normal S1 and S2, no S3 or S4, and no murmur noted   Abdomen:   Distended, ostomy in place, stoma healthy. Moderately ttp LLQ, mildly ttp diffusely                Musculoskeletal:   Grossly normal   Neurologic:   Awake, alert, oriented to name, place and time.  Cranial nerves II-XII are grossly intact.  Motor is 5 out of 5 bilaterally.     Neuropsychiatric:   General: normal, calm and normal eye contact   Skin:   normal skin color, texture, turgor     PICC in place RUE, dressing c/d/i, skin normal without erythema         Data:    All laboratory data reviewed    CT a/p reviewed with patient. Very distended stomach and small bowel loops with transition point in LLQ (my interpretation)     Chio Ochoa MD   [facility]

## 2018-07-24 NOTE — PATIENT INSTRUCTIONS
[facility] Main Line: [phone]    Call triage nurse with chills and/or temperature greater than or equal to 100.4, uncontrolled nausea/vomiting, diarrhea, constipation, dizziness, shortness of breath, chest pain, bleeding, unexplained bruising, or any new/concerning symptoms, questions/concerns.   If you are having any concerning symptoms or wish to speak to a provider before your next infusion visit, please call your care coordinator or triage to notify them so we can adequately serve you.   Triage Nurse Line: [phone]    If after hours, weekends, or holidays [phone]               July 2018 Sunday Monday Tuesday Wednesday Thursday Friday Saturday   1     2     3     Admission   11:57 AM   Denise Erwin MD   [address] [facility]   (Discharge: 7/8/2018)     CT ABDOMEN PELVIS W   12:45 PM   (30 min.)   UUCT1   [facility], [place], [place] 4     5     COMBINED COLONOSCOPY, STENT PLACEMENT   12:00 PM   Alexis Rios MD   UU OR     XR SURG TALA <5 MIN FL W STILL   12:25 PM   (30 min.)   UUXREPS1   [facility],  Radiology 6     7       8     9     10     [facility] DRAW    1:45 PM   (15 min.)    [facility] LAB DRAW   [facility] Lab Draw     [facility] RETURN    2:15 PM   (60 min.)   Deysi Lay PA-C   [facility] 11     12     13     14       15     16     17     18     19     20     P [facility] LAB DRAW    8:15 AM   (15 min.)    [facility] LAB DRAW   [facility] Lab Draw     P RETURN    8:35 AM   (50 min.)   Jacque Dale PA   [facility]     [facility] RETURN   10:45 AM   (30 min.)   Lorenzo Trujillo MD   [facility] 21       22     23     24     [facility] DRAW    1:30 PM   (15 min.)    [facility] LAB DRAW   [facility] Lab Draw     P RETURN    1:45 PM   (50 min.)   Jacque Dale PA   [facility] ONC INFUSION 120    2:00 PM   (120 min.)    ONCOLOGY  [facility]     CT ABDOMEN PELVIS WO    8:00 PM   (20 min.)   UCCT1   [facility] [place]     25     26     27     28       29     30     31 August 2018 Sunday Monday Tuesday Wednesday Thursday Friday Saturday                  1     2     3     4       5     6     7     8     9     10     11       12     13     14     15     16     17     18       19     20     21     22     23     24     25       26     27     28     29     30     31                       Lab Results:  Recent Results (from the past 12 hour(s))   CBC with platelets differential    Collection Time: 07/24/18  1:33 PM   Result Value Ref Range    WBC 16.7 (H) 4.0 - 11.0 10e9/L    RBC Count 4.24 (L) 4.4 - 5.9 10e12/L    Hemoglobin 11.6 (L) 13.3 - 17.7 g/dL    Hematocrit 34.8 (L) 40.0 - 53.0 %    MCV 82 78 - 100 fl    MCH 27.4 26.5 - 33.0 pg    MCHC 33.3 31.5 - 36.5 g/dL    RDW 16.8 (H) 10.0 - 15.0 %    Platelet Count 704 (H) 150 - 450 10e9/L    Diff Method Automated Method     % Neutrophils 66.7 %    % Lymphocytes 13.3 %    % Monocytes 15.9 %    % Eosinophils 0.2 %    % Basophils 0.7 %    % Immature Granulocytes 3.2 %    Nucleated RBCs 0 0 /100    Absolute Neutrophil 11.1 (H) 1.6 - 8.3 10e9/L    Absolute Lymphocytes 2.2 0.8 - 5.3 10e9/L    Absolute Monocytes 2.7 (H) 0.0 - 1.3 10e9/L    Absolute Eosinophils 0.0 0.0 - 0.7 10e9/L    Absolute Basophils 0.1 0.0 - 0.2 10e9/L    Abs Immature Granulocytes 0.5 (H) 0 - 0.4 10e9/L    Absolute Nucleated RBC 0.0    Comprehensive metabolic panel    Collection Time: 07/24/18  1:33 PM   Result Value Ref Range    Sodium 123 (L) 133 - 144 mmol/L    Potassium 3.6 3.4 - 5.3 mmol/L    Chloride 80 (L) 94 - 109 mmol/L    Carbon Dioxide 30 20 - 32 mmol/L    Anion Gap 13 3 - 14 mmol/L    Glucose 138 (H) 70 - 99 mg/dL    Urea Nitrogen 97 (H) 7 - 30 mg/dL    Creatinine 2.03 (H) 0.66 - 1.25 mg/dL    GFR Estimate 34 (L) >60 mL/min/1.7m2    GFR Estimate If  Black 41 (L) >60 mL/min/1.7m2    Calcium 9.6 8.5 - 10.1 mg/dL    Bilirubin Total 0.4 0.2 - 1.3 mg/dL    Albumin 3.2 (L) 3.4 - 5.0 g/dL    Protein Total 10.7 (H) 6.8 - 8.8 g/dL    Alkaline Phosphatase 169 (H) 40 - 150 U/L    ALT 17 0 - 70 U/L    AST 17 0 - 45 U/L   Magnesium    Collection Time: 07/24/18  1:33 PM   Result Value Ref Range    Magnesium 3.0 (H) 1.6 - 2.3 mg/dL   Phosphorus    Collection Time: 07/24/18  1:33 PM   Result Value Ref Range    Phosphorus 5.5 (H) 2.5 - 4.5 mg/dL

## 2018-07-24 NOTE — NURSING NOTE
Chief Complaint   Patient presents with     Blood Draw     labs drawn from picc by rn.  vs taken     Labs drawn from CVC by rn.  Good blood return noted in both lumens.  Both lumens flushed with NS.  Pt tolerated well.  VS taken.  Pt checked in for next appt.  Aurora Rocha RN

## 2018-07-24 NOTE — IP AVS SNAPSHOT
[address] [address]    Phone:  [phone]                                       After Visit Summary   7/24/2018    Sebas Lopez    MRN: [id number]           After Visit Summary Signature Page     I have received my discharge instructions, and my questions have been answered. I have discussed any challenges I see with this plan with the nurse or doctor.    ..........................................................................................................................................  Patient/Patient Representative Signature      ..........................................................................................................................................  Patient Representative Print Name and Relationship to Patient    ..................................................               ................................................  Date                                            Time    ..........................................................................................................................................  Reviewed by Signature/Title    ...................................................              ..............................................  Date                                                            Time

## 2018-07-24 NOTE — PROGRESS NOTES
Infusion Nursing Note:  Sebas Lopez presents today for IVF/Antiemetics.    Patient seen by provider today: Yes: Jacque Dale PA-C   present during visit today: Not Applicable.    Note: patient reported to clinic today with complaint of nausea/vomiting/abdominal pain. Jacque saw in infusion. Admit to hospital after infusion complete. Vomited moderate amount in infusion and down in CT, dark brown/black.     VORB: 7/24/18 1427 Jacque Dale PA-C/Barak Chandler RN  -Collect CDiff and Enteric Panel from colostomy (DONE).  -CT of abdomen (DONE)  -Admit to hospital. (unit 7D)      Intravenous Access:  PICC.    Treatment Conditions:  Lab Results   Component Value Date    HGB 11.6 07/24/2018     Lab Results   Component Value Date    WBC 16.7 07/24/2018      Lab Results   Component Value Date    ANEU 11.1 07/24/2018     Lab Results   Component Value Date     07/24/2018      Lab Results   Component Value Date     07/24/2018                   Lab Results   Component Value Date    POTASSIUM 3.6 07/24/2018           Lab Results   Component Value Date    MAG 3.0 07/24/2018            Lab Results   Component Value Date    CR 2.03 07/24/2018                   Lab Results   Component Value Date    ANNAMARIE 9.6 07/24/2018                Lab Results   Component Value Date    BILITOTAL 0.4 07/24/2018           Lab Results   Component Value Date    ALBUMIN 3.2 07/24/2018                    Lab Results   Component Value Date    ALT 17 07/24/2018           Lab Results   Component Value Date    AST 17 07/24/2018       Results reviewed, labs MET treatment parameters, ok to proceed with treatment.      Post Infusion Assessment:  Patient tolerated infusion without incident.  Blood return noted pre and post infusion.  Site patent and intact, free from redness, edema or discomfort.  No evidence of extravasations.    Discharge Plan:   Patient declined prescription refills.  Discharge instructions reviewed with:  Patient.  Patient and/or family verbalized understanding of discharge instructions and all questions answered.  Copy of AVS reviewed with patient and/or family. Patient admitted to hospital at this time. No future appts scheduled.   Patient discharged in stable condition accompanied by: self and wife.  Departure Mode: Ambulatory.  Face to Face time: 0 minutes.    Barak Chandler RN

## 2018-07-24 NOTE — MR AVS SNAPSHOT
After Visit Summary   7/24/2018    Sebas Lopez    MRN: [id number]           Patient Information     Date Of Birth          1965        Visit Information        Provider Department      7/24/2018 2:00 PM UC 26 ATC;  ONCOLOGY INFUSION [facility]        Today's Diagnoses     Cancer of sigmoid colon (H)    -  1    Diarrhea, unspecified type        Hypokalemia          Care [facility] Main Line: [phone]    Call triage nurse with chills and/or temperature greater than or equal to 100.4, uncontrolled nausea/vomiting, diarrhea, constipation, dizziness, shortness of breath, chest pain, bleeding, unexplained bruising, or any new/concerning symptoms, questions/concerns.   If you are having any concerning symptoms or wish to speak to a provider before your next infusion visit, please call your care coordinator or triage to notify them so we can adequately serve you.   Triage Nurse Line: [phone]    If after hours, weekends, or holidays [phone]               July 2018 Sunday Monday Tuesday Wednesday Thursday Friday Saturday   1     2     3     Admission   11:57 AM   Denise Erwin MD   [address] [facility]   (Discharge: 7/8/2018)     CT ABDOMEN PELVIS W   12:45 PM   (30 min.)   UUCT1   [facility], [place] 4     5     COMBINED COLONOSCOPY, STENT PLACEMENT   12:00 PM   Alexis Rios MD   UU OR     XR SURG TALA <5 MIN FL W STILL   12:25 PM   (30 min.)   UUXREPS1   [facility],  Radiology 6     7       8     9     10     [facility] LAB DRAW    1:45 PM   (15 min.)    [facility] LAB DRAW   [facility] Lab Draw     [facility] RETURN    2:15 PM   (60 min.)   Deysi Lay PA-C   [facility] 11     12     13     14       15     16     17     18     19     20     [facility] LAB DRAW    8:15 AM   (15 min.)    [facility] LAB DRAW   [facility] Lab Draw     [facility] RETURN    8:35 AM   (50 min.)   [place]  ANNAMARIE Floyd   [facility]     [facility] RETURN   10:45 AM   (30 min.)   Lorenzo Trujillo MD   [facility] 21       22     23     24     [facility] LAB DRAW    1:30 PM   (15 min.)    M[facility]IC LAB DRAW   [facility] Lab Draw     [facility] RETURN    1:45 PM   (50 min.)   [facility]que Dale [facility]    2:00 PM   (120 min.)   [facility] ON[facility] INFUSION   [facility]     CT ABDOMEN PELVIS WO    8:00 PM   (20 min.)   UCCT1   [facility] [place]     25     26     27     28       29     30     31                                    August 2018 Sunday Monday Tuesday Wednesday Thursday Friday Saturday                  1     2     3     4       5     6     7     8     9     10     11       12     13     14     15     16     17     18       19     20     21     22     23     24     25       26     27     28     29     30     31                       Lab Results:  Recent Results (from the past 12 hour(s))   CBC with platelets differential    Collection Time: 07/24/18  1:33 PM   Result Value Ref Range    WBC 16.7 (H) 4.0 - 11.0 10e9/L    RBC Count 4.24 (L) 4.4 - 5.9 10e12/L    Hemoglobin 11.6 (L) 13.3 - 17.7 g/dL    Hematocrit 34.8 (L) 40.0 - 53.0 %    MCV 82 78 - 100 fl    MCH 27.4 26.5 - 33.0 pg    MCHC 33.3 31.5 - 36.5 g/dL    RDW 16.8 (H) 10.0 - 15.0 %    Platelet Count 704 (H) 150 - 450 10e9/L    Diff Method Automated Method     % Neutrophils 66.7 %    % Lymphocytes 13.3 %    % Monocytes 15.9 %    % Eosinophils 0.2 %    % Basophils 0.7 %    % Immature Granulocytes 3.2 %    Nucleated RBCs 0 0 /100    Absolute Neutrophil 11.1 (H) 1.6 - 8.3 10e9/L    Absolute Lymphocytes 2.2 0.8 - 5.3 10e9/L    Absolute Monocytes 2.7 (H) 0.0 - 1.3 10e9/L    Absolute Eosinophils 0.0 0.0 - 0.7 10e9/L    Absolute Basophils 0.1 0.0 - 0.2 10e9/L    Abs Immature Granulocytes 0.5 (H) 0 - 0.4 10e9/L    Absolute Nucleated RBC 0.0     Comprehensive metabolic panel    Collection Time: 07/24/18  1:33 PM   Result Value Ref Range    Sodium 123 (L) 133 - 144 mmol/L    Potassium 3.6 3.4 - 5.3 mmol/L    Chloride 80 (L) 94 - 109 mmol/L    Carbon Dioxide 30 20 - 32 mmol/L    Anion Gap 13 3 - 14 mmol/L    Glucose 138 (H) 70 - 99 mg/dL    Urea Nitrogen 97 (H) 7 - 30 mg/dL    Creatinine 2.03 (H) 0.66 - 1.25 mg/dL    GFR Estimate 34 (L) >60 mL/min/1.7m2    GFR Estimate If Black 41 (L) >60 mL/min/1.7m2    Calcium 9.6 8.5 - 10.1 mg/dL    Bilirubin Total 0.4 0.2 - 1.3 mg/dL    Albumin 3.2 (L) 3.4 - 5.0 g/dL    Protein Total 10.7 (H) 6.8 - 8.8 g/dL    Alkaline Phosphatase 169 (H) 40 - 150 U/L    ALT 17 0 - 70 U/L    AST 17 0 - 45 U/L   Magnesium    Collection Time: 07/24/18  1:33 PM   Result Value Ref Range    Magnesium 3.0 (H) 1.6 - 2.3 mg/dL   Phosphorus    Collection Time: 07/24/18  1:33 PM   Result Value Ref Range    Phosphorus 5.5 (H) 2.5 - 4.5 mg/dL               Follow-ups after your visit        Your next 10 appointments already scheduled     Jul 24, 2018  8:00 PM CDT   CT ABDOMEN PELVIS W/O CONTRAST with UCCT1   [facility] [place] ([facility])    [address] [address] [phone]           Please bring any scans or X-rays taken at other hospitals, if similar tests were done. Also bring a list of your medicines, including vitamins, minerals and over-the-counter drugs. It is safest to leave personal items at home.  Be sure to tell your doctor:   If you have any allergies.   If there s any chance you are pregnant.   If you are breastfeeding.  How to prepare:   Do not eat or drink for 2 hours before your exam. If you need to take medicine, you may take it with small sips of water. (We may ask you to take liquid medicine as well.)   Please wear loose clothing, such as a sweat suit or jogging clothes. Avoid snaps, zippers and other metal. We may ask you to undress and put on a hospital  gown.  Please arrive 30 minutes early for your CT. Once in the department you might be asked to drink water 15-20 minutes prior to your exam.  If indicated you may be asked to drink an oral contrast in advance of your CT.  If this is the case, the imaging team will let you know or be in contact with you prior to your appointment  Patients over 70 or patients with diabetes or kidney problems:   If you haven t had a blood test (creatinine test) within the last 30 days, the Cardiologist/Radiologist may require you to get this test prior to your exam.  If you have diabetes:   Continue to take your metformin medication on the day of your exam  If you have any questions, please call the Imaging Department where you will have your exam.              Future tests that were ordered for you today     Open Future Orders        Priority Expected Expires Ordered    CBC with platelets differential Routine 7/24/2018 7/23/2019 7/23/2018    Comprehensive metabolic panel Routine 7/24/2018 7/23/2019 7/23/2018    Magnesium Routine 7/24/2018 7/23/2019 7/23/2018    Phosphorus Routine 7/24/2018 7/23/2019 7/23/2018            Who to contact     If you have questions or need follow up information about today's clinic visit or your schedule please contact [facility] directly at [phone].  Normal or non-critical lab and imaging results will be communicated to you by [employer], letter or phone within 4 business days after the clinic has received the results. If you do not hear from us within 7 days, please contact the clinic through [employer] or phone. If you have a critical or abnormal lab result, we will notify you by phone as soon as possible.  Submit refill requests through [employer] or call your pharmacy and they will forward the refill request to us. Please allow 3 business days for your refill to be completed.          Additional Information About Your Visit        [employer] Information     [employer] gives you secure  access to your electronic health record. If you see a primary care provider, you can also send messages to your care team and make appointments. If you have questions, please call your primary care clinic.  If you do not have a primary care provider, please call [phone] and they will assist you.        Care EveryWhere ID     This is your Care EveryWhere ID. This could be used by other organizations to access your [facility] medical records  [id number]         Blood Pressure from Last 3 Encounters:   07/24/18 109/85   07/20/18 111/74   07/10/18 112/71    Weight from Last 3 Encounters:   07/24/18 65 kg (143 lb 6.4 oz)   07/20/18 69.9 kg (154 lb 3.2 oz)   07/10/18 73 kg (161 lb)              We Performed the Following     CBC with platelets differential     Comprehensive metabolic panel     Magnesium     Phosphorus        Primary Care Provider Office Phone # Fax #    Jaguar Becker [phone]       [place] [address] [facility] [address]        Equal Access to Services     MELVIN [facility] : Hadii aad ku hadasho Soomaali, waaxda luqadaha, qaybta kaalmada adeegyada, bhargav sage . So [facility] [phone].    ATENCIÓN: Si habla español, tiene a cid disposición servicios gratuitos de asistencia lingüística. LlWood County Hospital [phone].    We comply with applicable federal civil rights laws and [place] laws. We do not discriminate on the basis of race, color, national origin, age, disability, sex, sexual orientation, or gender identity.            Thank you!     Thank you for choosing [facility]  for your care. Our goal is always to provide you with excellent care. Hearing back from our patients is one way we can continue to improve our services. Please take a few minutes to complete the written survey that you may receive in the mail after your visit with us. Thank you!             Your Updated Medication List - Protect others around you: Learn how to  safely use, store and throw away your medicines at www.disposemymeds.org.          This list is accurate as of 7/24/18  3:49 PM.  Always use your most recent med list.                   Brand Name Dispense Instructions for use Diagnosis    amylase-lipase-protease 08888 units Cpep    CREON    240 capsule    Take 2 capsules (72,000 Units) by mouth 3 times daily (with meals) And 1 capsule with each snack tid.    Diarrhea, unspecified type, Peritoneal carcinomatosis (H), Cancer of sigmoid colon (H)       cyanocobalamin 1000 MCG tablet    vitamin  B-12     Take 1,000 mcg by mouth daily        dronabinol 5 MG capsule    MARINOL    60 capsule    Take 1 capsule (5 mg) by mouth 2 times daily (before meals)    Peritoneal carcinomatosis (H), Anorexia       * fentaNYL 25 mcg/hr 72 hr patch    DURAGESIC    1 patch    Place 1 patch onto the skin every 72 hours remove old patch.    Cancer of sigmoid colon (H)       * FentaNYL 37.5 MCG/HR Pt72     10 patch    Place 37 mcg/hr onto the skin every 72 hours    Colon adenocarcinoma (H)       FLAGYL 500 MG tablet   Generic drug:  metroNIDAZOLE     16 tablet    Take 1 tablet (500 mg) by mouth 3 times daily    Colitis       loperamide 2 MG capsule    IMODIUM    30 capsule    2 caps at 1st sign of diarrhea & 1 cap every 2hrs until 12hrs diarrhea free. During night, 2 caps at bedtime & 2 caps every 4hrs until AM    Peritoneal carcinomatosis (H), Cancer of sigmoid colon (H)       * LORazepam 0.5 MG tablet    ATIVAN    30 tablet    Take 1 tablet (0.5 mg) by mouth every 4 hours as needed (Anxiety, Nausea/Vomiting or Sleep)    Peritoneal carcinomatosis (H), Cancer of sigmoid colon (H)       * LORazepam 0.5 MG tablet    ATIVAN    30 tablet    Take 1 tablet (0.5 mg) by mouth every 4 hours as needed (Anxiety, Nausea/Vomiting or Sleep)    Peritoneal carcinomatosis (H), Cancer of sigmoid colon (H)       MULTIVITAMIN & MINERAL PO      Take 1 tablet by mouth daily        naloxone nasal spray    NARCAN     0.2 mL    Spray 1 spray (4 mg) into one nostril alternating nostrils as needed for opioid reversal every 2-3 minutes until assistance arrives    Cancer of sigmoid colon (H), Long term current use of opiate analgesic       NIACIN PO      Take by mouth daily        omeprazole 20 MG CR capsule    priLOSEC    30 capsule    Take 1 capsule (20 mg) by mouth daily    Diarrhea, unspecified type       ondansetron 8 MG ODT tab    ZOFRAN-ODT    60 tablet    Take 1 tablet (8 mg) by mouth every 8 hours as needed for nausea    Nausea       ondansetron 8 MG tablet    ZOFRAN    10 tablet    Take 1 tablet (8 mg) by mouth every 8 hours as needed (Nausea/Vomiting)    Peritoneal carcinomatosis (H), Cancer of sigmoid colon (H)       opium tincture 10 MG/ML (1%) liquid     72 mL    Take 0.6 mLs (6 mg) by mouth 4 times daily    Cancer of sigmoid colon (H), Diarrhea, unspecified type       * oxyCODONE IR 5 MG tablet    ROXICODONE    20 tablet    Take 15-25 mg q4 hrs as needed for moderate to severe pain (can take with 10 mg tabs of previous prescription to make 15 mg or 25 mg doses).    Cancer of sigmoid colon (H)       * oxyCODONE HCl 20 MG Tabs immediate release tablet    ROXICODONE    180 tablet    Take 20-30 mg by mouth every 4 hours as needed for moderate to severe pain Take 20-30 mg every 4 hours as needed for moderate to severe pain    Peritoneal carcinomatosis (H)       parenteral nutrition - PTA/DISCHARGE ORDER      Inject into the vein daily [facility]        prochlorperazine 10 MG tablet    COMPAZINE    30 tablet    Take 1 tablet (10 mg) by mouth every 6 hours as needed (Nausea/Vomiting)    Peritoneal carcinomatosis (H), Cancer of sigmoid colon (H)       sodium chloride 0.9% Soln BOLUS     1000 mL    Inject 1,000 mLs into the vein daily as needed For hydration.        * Notice:  This list has 6 medication(s) that are the same as other medications prescribed for you. Read the directions carefully, and ask your doctor or other care  provider to review them with you.

## 2018-07-25 NOTE — PROGRESS NOTES
CLINICAL NUTRITION SERVICES - ASSESSMENT NOTE     Nutrition Prescription    RECOMMENDATIONS FOR MDs/PROVIDERS TO ORDER:  Recommend evaluating need for additional IVF's at home pending renal function and ostomy output.     Malnutrition Status:    Severe malnutrition in the context of acute illness      Recommendations already ordered by Registered Dietitian (RD):  None    Future/Additional Recommendations:  Need to optimize pt's PN formula (pending improvement in renal function) with increasing dex to new goal of 315 g and increase lipids to 6 days per week to provide a total of 1900 kcals (30 kcals/kg) and 1.6 g PRO/kg) in order to better meet pt's nutritional needs to help stabilize pt's wt      REASON FOR ASSESSMENT  Sebas Lopez is a/an 53 year old male assessed by the dietitian for Admission Nutrition Risk Screen for tube feeding or parenteral nutrition and Pharmacy/Nutrition to Start and Manage PN    NUTRITION HISTORY  Pt known to Nutrition Services from previous hospitalizations d/t ongoing need for PN support (followed by [facility] for home PN) d/t ongoing bowel obstruction  Per chart review, noted pt was last hospitalized from 7/3 thru 7/8. Per [facility], home regimen since discharging on 7/8 has consisted of Dextrose 250 g (850 kcals), 100 g AA (400 kcals) and 250 ml lipids daily x 5 days per week (357 kcals) which provides a total of 1607 kcals  and 100 g PRO. Per discussion with Pharm D with [facility], informed that pt's lipid infusion was recently increased from 4 days per week to 5 days per week on 7/20 d/t pt's weight dropping.    Noted the macronutrients in above PN regimen had been increased during last hospitalization from previous formula of dex 195 g, AA 75 g and 250 ml lipids x 4 days per week which was providing 1366 kcals (20 kcals/kg) and 1.1 g PRO/kg d/t pt no longer taking po intakes.  Per visit with pt this afternoon, pt reports he was infusing his PN daily during interim, except for 1 day he held it  "d/t wt gain which occurred during his last hospitalization (wt increased from admit wt of 69.4 kg on 7/3 to 74.7 kg on 7/8).   Pt reports he hasn't been taking any oral intakes since middle of June d/t bowel obstruction. Does receives IVF's at home with taking 1.5 liters per day.   During interim (since last hospitalization), pt reports he has had N/V x several days, in addition to ongoing ostomy outputs    CURRENT NUTRITION ORDERS  Diet: NPO since admit. Now with NGT to LIS for decompression    Nutrition Support  Parenteral (entered by Pharm D based on home formula)  Type of Access: Central Line  Parenteral Frequency: Cycled x 12 hrs  Parenteral Regimen;  CPN, goal volume 2400 ml/day with 250 g Dex daily (850 kcal), 100 g AA daily (400 kcal) and 250 ml of 20% IV lipids x 5 days per week = 1607 kcals/day (25 kcal/kg/day), 1.5 g PRO/kg/day, GIR 2.6  with 22 % kcals from Fat.   - Above formula is underfeeding pt calorically.    LABS  Significant electrolyte abnormalities (Na ++, BUN/Cr, K+/Mg/PO 4) per review of labs yesterday and today.  Per MD note, suspect r/t PAULINA.   (today), suspect falsely evaluated d/t lipids running during lab draw per RN report.     MEDICATIONS  Medications reviewed    ANTHROPOMETRICS  Height: 175.3 cm (5' 9\")  Most Recent Weight: 64.7 kg (142 lb 9.6 oz) - today's (lowest wt this admission),  Admit wt = 66.1 kg (145 lbs) on 7/24.  IBW: 72.7 kg (89% of IBW)  BMI: Normal BMI  Weight History: .Pt with previous admit wt of 69.4 kg on 7/3. Current wt is down 4.7 kg (6.8% loss) x past 2 weeks.  Wt Readings from Last 10 Encounters:   07/25/18 64.7 kg (142 lb 9.6 oz)   07/24/18 65 kg (143 lb 6.4 oz)   07/20/18 69.9 kg (154 lb 3.2 oz)   07/10/18 73 kg (161 lb)   07/08/18 74.7 kg (164 lb 9.6 oz)   06/28/18 69.2 kg (152 lb 9.6 oz)   06/27/18 70.5 kg (155 lb 8 oz)   06/15/18 69.2 kg (152 lb 9.6 oz)   05/31/18 68.1 kg (150 lb 3.2 oz)   05/18/18 68 kg (150 lb)     Dosing Weight: 65 kg    ASSESSED " NUTRITION NEEDS  Estimated Energy Needs: 2070-7166 kcals/day (30 - 35 kcals/kg)  Justification: Underweight  Estimated Protein Needs: 85-98 grams protein/day (1.3 - 1.5 grams of pro/kg)  Justification: Repletion  Estimated Fluid Needs: (1 mL/kcal)   Justification: Maintenance    PHYSICAL FINDINGS  See malnutrition section below.    MALNUTRITION  (Limited to upper extremities d/t lab draw at bedside)  % Intake: Decreased intake does not meet criteria  % Weight Loss: > 2% in 1 week (severe)  Subcutaneous Fat Loss: Facial region, Upper arm and Thoracic/intercostal: Mpoderate  Muscle Loss: Temporal, Facial & jaw region and Thoracic region (clavicle, acromium bone, deltoid, trapezius, pectoral): Moderate  Fluid Accumulation/Edema: None noted per chart review  Malnutrition Diagnosis: Severe malnutrition in the context of acute illness    NUTRITION DIAGNOSIS  Unintended weight loss related to dependent on PN, but formulation underfeeding pt in addition to pt having increased fluid losses secondary to N/V and ostomy output as evidenced by wt loss of 4.7 kg (6.8% loss) x past 2 weeks and home PN meeting only 25 kcals/kg.      INTERVENTIONS  Implementation  Nutrition Education for recommended modifications for optimizing pt's home PN formula to better meet his caloric needs in order to prevent further wt loss.  Collaboration with Provider and Pharm D regarding changes to PN formula once renal function improves.    Goals  Total avg nutritional intake to meet a minimum of 30 kcal/kg and 1.3 g PRO/kg daily (per dosing wt 65 kg).     Monitoring/Evaluation  Progress toward goals will be monitored and evaluated per protocol.  Tash Yip RD,LD  Unit Pager [phone]

## 2018-07-25 NOTE — PROGRESS NOTES
This is a recent snapshot of the patient's [facility] medical record.  For current drug dose and complete information and questions, call [phone] or In Basket pool, [facility] home infusion ([id number])  CSN Number:  [id number]

## 2018-07-25 NOTE — PROGRESS NOTES
Problem: Patient Care Overview  Goal: Plan of Care/Patient Progress Review  Outcome: Therapy, progress toward functional goals as expected    Alert and oriented x4. AVSS, afebrile. Reports ongoing LLQ abdominal pain, pt requesting IV Dilaudid q2hrs, pt reports good relief for 1.5hrs or so; pt observe to be comfortable and able to perform ADLs. Reports nausea, Zofran IV x1 given, good relief.  Potassium 3, IV replacements given, recheck at 3.2, additional IV replacement will be given. Cr labs elevated, good UOP, recheck BMP at 1700, slightly elevated, pt continues on MIVF at 125cc/hr, UA sent down. NG LIS, good OP, greenish brown with mild sips of ice. Lozenge given for sore throat d/t placement of NG tube. Ileostomy, patent, good OP. Pt continues on Cycled TPN, next dose will be due at 2000, per MD no need for BG checks q6hrs, pt has been on home TPN for 3 months. Fentanyl patches (2) on R deltoid. Independent with cares, assist with NG connections. Continue with POC.

## 2018-07-25 NOTE — PLAN OF CARE
Problem: Patient Care Overview  Goal: Plan of Care/Patient Progress Review  Outcome: Therapy, progress toward functional goals as expected  VSS. Sepsis triggered, lactic 1.4. Pt continues to c/o abd pain. PRN IV dilaudid available q 2 hours. 2 fentanyl patches placed on right deltoid (12 mcg + 25 mcg = 37 mcg total dose). No c/o nausea tonight, but IV zofran & compazine available PRN. Diet strictly NPO. Abdomen distended. NG tube ordered & placed. Xray confirmed tube placement in stomach. Suction set up to low intermittent. Ileostomy intact. Pt prefers to empty & change bag on his own and will record output on whiteboard. Double lumen PICC in right arm- dressing changed tonight by vascular access. Cycled TPN & lipids running. Glucose checks q 6 hours for the next 72 hours w/ start of TPN. D5 NS w/ K+ running at 100/hr. Sodium came back low at 121. Sodium random urine & urine osmolality still need to be collected per PCU. Pt is independent & calls appropriately. Continue poc.

## 2018-07-25 NOTE — PROGRESS NOTES
[facility], [place]    Hematology / Oncology Progress Note    Date of Admission: 7/24/2018  Hospital Day #: 1   Date of Service (when I saw the patient): 07/25/2018     Assessment & Plan   Sebas Lopez is a 53 year old male with PMHx significant for ulcerative colitis and sigmoid adenocarcinoma with peritoneal carcinomatosis s/p ileostomy 7/2017 and colonic stent 3/19 (with most recent revision 7/5) who presents from clinic with 3-4 day history of nausea/vomiting and increased abdominal pain, found to have a small bowel obstruction on CT scan.    #Small bowel obstruction. Transition point in LLQ.  #Nausea and vomiting.   #Increased abdominal pain.   Symptoms started about 3-4 days ago. Multiple emesis per day, notes abdominal pain would improve with emesis. Explains emesis appeared similar to ileostomy output in color/consistency. Ileostomy output seemed to remain the same about of output/mildly decrease. No blood in ileostomy output. Tried to keep up with PO fluid intake but could not. Found to have a SBO with dilated loops of small bowel and transition point in LLQ on CT. Additionally labs demonstrated an PAULINA and electrolyte abnormalities (See below).   -NG to LIS for decompression.   -IVF replacement.  -NPO  -IV medications as able. Home meds (bowel meds, vitamins, etc) held d/t strict NPO status and NG.   -IV antiemetics to treat sx.  -Medical mgmt at this time.    #Hypokalemia/Hyponatremia/Hypochoremia.   #Hypermagnesemia/Hyperphosphatemia.   #PAULINA. Cr on admission 2.66.   Likely 2/2 above nausea/vomiting/SBO leading to dehydration. Skin with some tenting on AM exam 7/25. On TPN support (see below).   -Replace lytes per protocol prn.   -IVF  ml/hr (+TPN volume).  -Monitor BID BMP.     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    Follows with Dr. Long outpatient. S/p ex lap 7/2017 with small bowel resection and end ileostomy. S/p 6 cycles of FOLFOX.  Admitted on 12/6/2017 for ex-lap with aborted HIPEC due to diffuse carcinomatosis. Repeat CT scan showed worsening peritoneal carcinomatosis. He was admitted to the hospital with worsening colon distention and colon stent was placed which improved his symptoms. He resumed FOLFOX C7 3/8/18 f/b hospitalization for bowel obstruction and stent replacement 3/19/2018. Continued with FOLFOX, last cycle #13 6/15/18. Repeat CT 6/27 with slightly progressed peritoneal carcinomatosis, increased colonic and rectal fluid favoring colitis. Per Dr. Long's note 6/28 next treatment options would likely be irinotecan and panitumumab. Has been unable to begin d/t recent hospitalizations.   -Will need follow up scheduled at discharge.     #Leukocytosis.   #Anemia.  Has had a mild leukocytosis on follow up labs in clinic. Admission WBC count 16.7-->elevated to 18.7 today. Anemia 2/2 chronic illness.   -BCx drawn 7/25, NGTD.  -Afebrile, monitor at this time. Low threshold to start antibiotics if spikes.  -Will monitor with daily CBC.    # Pain Assessment:  Current Pain Score 7/25/2018   Patient currently in pain? yes   Pain score (0-10) -   Pain location -   Pain descriptors -   - Sebas is experiencing pain due to metastatic sigmoid adenocarcinoma. Pain management was discussed and the plan was created in a collaborative fashion.  Sebas's response to the current recommendations: engaged  - Opioid regimen: Fentanyl patch 37 mcg/72hrs, prn IV dilaudid for breakthrough. HELD PTA PO oxycodone d/t NPO status.  - Bowel regimen: defer d/t SBO and diarrhea through ileostomy. Tincture of opium and imodium held d/t NPO status.    FEN:  -IVF @ 100 ml/hr + TPN volume  -PRN lyte replacement  -NPO, NG in place, continue PTA TPN support    Prophy/Misc:  -VTE: heparin subcutaneous q8hrs  -GI/PUD: IV Protonix    Code status: Full    Disposition: Pending improvement in acute issues, likely 2-4 day stay.     Patient and plan discussed with staff,   Lashawn.    Margarita Dougherty PA-C  Hematology/Oncology  Pager #[phone]    Interval History   Sebas is feeling mildly better today. Nausea/vomiting improved with NG suction and antiemetics. Having throbbing LLQ pain and mild abdominal distension. Ostomy output about his usual. No rectal stool for a few days.     Physical Exam   Temp: 96.3  F (35.7  C) Temp src: Oral BP: 106/68 Pulse: 97 Heart Rate: 92 Resp: 18 SpO2: 96 % O2 Device: None (Room air)    Vitals:    07/24/18 1741 07/25/18 0743   Weight: 66.1 kg (145 lb 10.1 oz) 64.7 kg (142 lb 9.6 oz)     Vital Signs with Ranges  Temp:  [96  F (35.6  C)-97.5  F (36.4  C)] 96.3  F (35.7  C)  Pulse:  [] 97  Heart Rate:  [88-92] 92  Resp:  [16-18] 18  BP: ()/(68-85) 106/68  SpO2:  [93 %-99 %] 96 %  I/O last 3 completed shifts:  In: 1983.33 [I.V.:983.33; IV Piggyback:1000]  Out: 2850 [Urine:525; Emesis/NG output:1600; Stool:725]    Constitutional: Pleasant male seen sitting up in bed, in NAD. Alert and interactive.   HEENT: NCAT, anicteric sclerae, conjunctiva clear. Moist mucous membranes mildly dry. NG in place.  Respiratory: Non-labored breathing, good air exchange. Lungs are clear to auscultation bilaterally, without wheezing, crackles or rhonchi. No cough noted.   Cardiovascular: Regular rate and rhythm with no murmur, rub or gallop.  GI: Absent BS. Abdomen is mildly distended, tender to palpation of lower quadrants and LUQ, no guarding. Mild amount of clear output with flecks.   Skin: Warm and dry. No rashes or lesions on exposed surfaces. Mild tenting after pinched skin.  Musculoskeletal: Extremities grossly normal. No tenderness or edema present.   Neurologic: A &O x3, speech normal, answering questions appropriately. Moves all extremities spontaneously. Grossly non-focal.  Neuropsychiatric: Mentation and affect normal/appropriate.  VAD: PICC is c/d/i with no erythema, drainage, or tenderness.    Medications   Current Facility-Administered Medications    Medication     benzocaine-menthol (CEPACOL) 15-3.6 MG lozenge 1 lozenge     dextrose 10 % 1,000 mL infusion     diphenhydrAMINE (BENADRYL) injection 50 mg     fentaNYL (DURAGESIC) 12 mcg/hr 72 hr patch 1 patch     fentaNYL (DURAGESIC) 25 mcg/hr 72 hr patch 1 patch     fentaNYL (DURAGESIC) Patch in Place     [START ON 7/27/2018] fentaNYL (DURAGESIC) patch REMOVAL     heparin sodium PF injection 5,000 Units     HYDROmorphone (PF) (DILAUDID) injection 0.5-1 mg     lipids (INTRALIPID) 20 % infusion 250 mL     Medication Instruction     naloxone (NARCAN) injection 0.4 mg     ondansetron (ZOFRAN) injection 8 mg     parenteral nutrition - ADULT compounded formula CYCLE     parenteral nutrition - ADULT compounded formula CYCLE     potassium chloride (KLOR-CON) Packet 20-40 mEq     potassium chloride 10 mEq in 100 mL intermittent infusion with 10 mg lidocaine     potassium chloride 10 mEq in 100 mL sterile water intermittent infusion (premix)     potassium chloride 20 mEq in 50 mL intermittent infusion     potassium chloride SA (K-DUR/KLOR-CON M) CR tablet 20-40 mEq     prochlorperazine (COMPAZINE) tablet 10 mg    Or     prochlorperazine (COMPAZINE) injection 10 mg     sodium chloride (PF) 0.9% PF flush 3 mL     sodium chloride 0.9% infusion     Facility-Administered Medications Ordered in Other Encounters   Medication     ondansetron (ZOFRAN) injection 8 mg       Data   CBC  Recent Labs  Lab 07/25/18  0548 07/24/18  1333 07/20/18  0840   WBC 18.7* 16.7* 10.6   RBC 3.36* 4.24* 3.40*   HGB 9.5* 11.6* 9.7*   HCT 27.8* 34.8* 29.5*   MCV 83 82 87   MCH 28.3 27.4 28.5   MCHC 34.2 33.3 32.9   RDW 17.0* 16.8* 18.1*   *  583* 704* 574*     CMP  Recent Labs  Lab 07/25/18  0548 07/25/18  0005 07/24/18  2215 07/24/18  1333 07/20/18  0840   * 120* 121* 123* 130*   POTASSIUM 3.0* 4.0  --  3.6 4.3   CHLORIDE 81* 75*  --  80* 95   CO2 32 32  --  30 29   ANIONGAP 10 14  --  13 7   * 153*  --  138* 114*   BUN 95* 98*   --  97* 32*   CR 2.15* 2.66*  --  2.03* 0.85   GFRESTIMATED 32* 25*  --  34* >90   GFRESTBLACK 39* 30*  --  41* >90   ANNAMARIE 8.8 9.4  --  9.6 8.9   MAG 2.9* 3.0*  --  3.0* 2.1   PHOS 4.6* 6.4*  --  5.5*  --    PROTTOTAL  --   --   --  10.7* 8.6   ALBUMIN  --   --   --  3.2* 2.5*   BILITOTAL  --   --   --  0.4 0.3   ALKPHOS  --   --   --  169* 119   AST  --   --   --  17 21   ALT  --   --   --  17 15     INRNo lab results found in last 7 days.    Results for orders placed or performed during the hospital encounter of 07/24/18   XR Abdomen Port 1 View    Narrative    XR ABDOMEN PORT 1 VW  7/24/2018 10:11 PM      HISTORY: ng tube placement;     COMPARISON: CT earlier same day    FINDINGS: Gastric tube is looped in the distal esophagus. Gaseous  distention of the stomach. No pleural effusion. No confluent pulmonary  opacities within the lung bases.      Impression    IMPRESSION: Gastric tube coiled within the distal esophagus which is  repositioned on the subsequent radiograph.    I have personally reviewed the examination and initial interpretation  and I agree with the findings.    KARMEN ODONNELL MD   XR Abdomen Port 1 View    Narrative    Single view of the abdomen  7/24/2018 11:34 PM      HISTORY: NG tube placement    COMPARISON: Radiograph earlier same day    FINDINGS: Gastric tube is repositioned, tip projecting over the  thoracic fundus. Gaseous distention of the stomach, decreased since  prior. No pleural effusion. No confluent pulmonary opacities within  the lung bases.      Impression    IMPRESSION: Gastric tube repositioning, in proper position over the  fundal/body region.    I have personally reviewed the examination and initial interpretation  and I agree with the findings.    KARMEN ODONNELL MD

## 2018-07-25 NOTE — PLAN OF CARE
Problem: Patient Care Overview  Goal: Plan of Care/Patient Progress Review  Outcome: No Change  AVSS. C/O pain in abdomen, received 1 mg IV dilaudid x1 and 0.5mg IV dilaudid x 2, with relief. Fentanyl patch on right deltoid. MD notified of lab changes. Received 1L bolus NS and fluids changed to 125 ml/hr NS. Cycled TPN running. NG to low intermittent suction with of 1595 ml of output.

## 2018-07-26 NOTE — PLAN OF CARE
Problem: Patient Care Overview  Goal: Plan of Care/Patient Progress Review  Outcome: No Change  Continues to require hydromorphone q2h for abdominal pain. Good output of NG this shift, but no ostomy output this shift. NPO with cycled TPN continues.

## 2018-07-26 NOTE — PROGRESS NOTES
[facility], [place]    Hematology / Oncology Progress Note    Date of Admission: 7/24/2018  Hospital Day #: 2   Date of Service (when I saw the patient): 07/26/2018     Assessment & Plan   Sebas Lopez is a 53 year old male with PMHx significant for ulcerative colitis and sigmoid adenocarcinoma with peritoneal carcinomatosis s/p ileostomy 7/2017 and colonic stent 3/19 (with most recent revision 7/5) who presents from clinic with 3-4 day history of nausea/vomiting and increased abdominal pain, found to have a small bowel obstruction on CT scan.    #Small bowel obstruction. Transition point in LLQ.  #Nausea and vomiting.   #Increased abdominal pain.   Symptoms started about 3-4 days ago. Multiple emesis per day, notes abdominal pain would improve with emesis. Explains emesis appeared similar to ileostomy output in color/consistency. Ileostomy output seemed to remain the same about of output/mildly decrease. No blood in ileostomy output. Tried to keep up with PO fluid intake but could not. Found to have a SBO with dilated loops of small bowel and transition point in LLQ on CT. Additionally labs demonstrated an PAULINA and electrolyte abnormalities (See below).   -NG to LIS for decompression.   -IVF replacement.  -NPO  -IV medications as able. Home meds (bowel meds, vitamins, etc) held d/t strict NPO status and NG.   -IV antiemetics to treat sx.  -Medical mgmt at this time.    #Hypokalemia/Hyponatremia/Hypochoremia. Improving.  #Hypermagnesemia/Hyperphosphatemia. Improving.  #PAULINA. Cr on admission 2.66. Improving, Cr 1.31 today.  Likely 2/2 above nausea/vomiting/SBO leading to dehydration. Skin with some tenting on AM exam 7/25. On TPN support (see below).   -Replace lytes per protocol prn.   -IVF  ml/hr (+TPN volume), now decreased to 50 ml/hr +TPN volume.  -Monitor BID BMP.     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    Follows with Dr. Long outpatient. S/p  ex lap 7/2017 with small bowel resection and end ileostomy. S/p 6 cycles of FOLFOX. Admitted on 12/6/2017 for ex-lap with aborted HIPEC due to diffuse carcinomatosis. Repeat CT scan showed worsening peritoneal carcinomatosis. He was admitted to the hospital with worsening colon distention and colon stent was placed which improved his symptoms. He resumed FOLFOX C7 3/8/18 f/b hospitalization for bowel obstruction and stent replacement 3/19/2018. Continued with FOLFOX, last cycle #13 6/15/18. Repeat CT 6/27 with slightly progressed peritoneal carcinomatosis, increased colonic and rectal fluid favoring colitis. Per Dr. Long's note 6/28 next treatment options would likely be irinotecan and panitumumab. Has been unable to begin d/t recent hospitalizations.   -Will need follow up scheduled at discharge.     #Leukocytosis. Midly improved.  #Anemia.  Has had a mild leukocytosis on follow up labs in clinic. Admission WBC count 16.7-->elevated to 18.7 7/25. Anemia 2/2 chronic illness.   -BCx drawn 7/25, NGTD.  -Afebrile, monitor at this time. Low threshold to start antibiotics if spikes.  -Will monitor with daily CBC.    # Pain Assessment:  Current Pain Score 7/26/2018   Patient currently in pain? yes   Pain score (0-10) -   Pain location Abdomen   Pain descriptors -   - Sebas is experiencing pain due to metastatic sigmoid adenocarcinoma. Pain management was discussed and the plan was created in a collaborative fashion.  Sebas's response to the current recommendations: engaged  - Opioid regimen: Fentanyl patch 37 mcg/72hrs, IV dilaudid 1-1.5 mg q2hrs prn for breakthrough. HELD PTA PO oxycodone d/t NPO status.  - Bowel regimen: defer d/t SBO and diarrhea through ileostomy. Tincture of opium and imodium held d/t NPO status.    FEN:  -IVF @ 50 ml/hr + TPN volume  -PRN lyte replacement  -NPO, NG in place, continue PTA TPN support    #Severe malnutrition in context of acute illness. >2% weight loss in 1 week, moderate subcutaneous  "fat loss, muscle loss in temporal/facial/jaw/ and thoracic region.   -Continue TPN support, optimize nutritional support.    Prophy/Misc:  -VTE: heparin subcutaneous q8hrs  -GI/PUD: IV Protonix    Code status: Full    Disposition: Pending improvement in acute issues, likely 2-4 day stay.     Patient and plan discussed with staff, Dr. Hardin.    Margarita Dougherty PA-C  Hematology/Oncology  Pager #[phone]    Interval History   Sebas is feeling okay today. He notes he had a \"rough night\" because he did not sleep secondary to pain, and the noise from NG. Pain is moderately controlled but feels as though the dosing wears off before the next q2 hr dose. Continues to have ostomy output, no gas in ostomy bag. No bowel sounds. LLQ pain still bothersome. Denies new infectious symptoms. Throat lozenges helpful with pharyngeal irritation 2/2 NG tube. Did note sm rectal stool today, no hematochezia/BRBPR.    Physical Exam   Temp: 97.6  F (36.4  C) Temp src: Axillary BP: 117/79   Heart Rate: 91 Resp: 16 SpO2: 98 % O2 Device: None (Room air)    Vitals:    07/24/18 1741 07/25/18 0743 07/26/18 0719   Weight: 66.1 kg (145 lb 10.1 oz) 64.7 kg (142 lb 9.6 oz) 65 kg (143 lb 3.2 oz)     Vital Signs with Ranges  Temp:  [96.2  F (35.7  C)-97.6  F (36.4  C)] 97.6  F (36.4  C)  Heart Rate:  [84-95] 91  Resp:  [16-18] 16  BP: ()/(59-79) 117/79  SpO2:  [95 %-100 %] 98 %  I/O last 3 completed shifts:  In: 5367.2 [P.O.:180; I.V.:2060]  Out: 3950 [Urine:850; Emesis/NG output:2150; Stool:950]    Constitutional: Pleasant male seen sitting up in bed, in NAD. Alert and interactive.   HEENT: NCAT, anicteric sclerae, conjunctiva clear. Moist mucous membranes moist, without lesions or thrush. NG in place.  Respiratory: Non-labored breathing, good air exchange. Lungs are clear to auscultation bilaterally, without wheezing, crackles or rhonchi. No cough noted.   Cardiovascular: Regular rate and rhythm with no murmur, rub or gallop.  GI: Absent BS. " Abdomen is mildly distended, tender to palpation of lower quadrants and LUQ, no guarding. No rebound. Just emptied ostomy bag, NG with mod-lg green liquid output.   Skin: Warm and dry. No rashes or lesions on exposed surfaces.  Musculoskeletal: Extremities grossly normal. No tenderness or edema present.   Neurologic: A &O x3, speech normal, answering questions appropriately. Moves all extremities spontaneously. Grossly non-focal.  Neuropsychiatric: Mentation and affect normal/appropriate.  VAD: PICC is c/d/i with no erythema, drainage, or tenderness.    Medications   Current Facility-Administered Medications   Medication     benzocaine-menthol (CEPACOL) 15-3.6 MG lozenge 1 lozenge     dextrose 10 % 1,000 mL infusion     diphenhydrAMINE (BENADRYL) injection 50 mg     fentaNYL (DURAGESIC) 12 mcg/hr 72 hr patch 1 patch     fentaNYL (DURAGESIC) 25 mcg/hr 72 hr patch 1 patch     fentaNYL (DURAGESIC) Patch in Place     [START ON 7/27/2018] fentaNYL (DURAGESIC) patch REMOVAL     heparin sodium PF injection 5,000 Units     HYDROmorphone (DILAUDID) injection 1-1.5 mg     lipids (INTRALIPID) 20 % infusion 250 mL     Medication Instruction     naloxone (NARCAN) injection 0.4 mg     ondansetron (ZOFRAN) injection 8 mg     pantoprazole (PROTONIX) 40 mg IV push injection     parenteral nutrition - ADULT compounded formula CYCLE     parenteral nutrition - ADULT compounded formula CYCLE     potassium chloride (KLOR-CON) Packet 20-40 mEq     potassium chloride 10 mEq in 100 mL intermittent infusion with 10 mg lidocaine     potassium chloride 10 mEq in 100 mL sterile water intermittent infusion (premix)     potassium chloride 20 mEq in 50 mL intermittent infusion     potassium chloride SA (K-DUR/KLOR-CON M) CR tablet 20-40 mEq     prochlorperazine (COMPAZINE) tablet 10 mg    Or     prochlorperazine (COMPAZINE) injection 10 mg     sodium chloride (PF) 0.9% PF flush 3 mL     sodium chloride 0.9% infusion     Facility-Administered  Medications Ordered in Other Encounters   Medication     ondansetron (ZOFRAN) injection 8 mg       Data   CBC    Recent Labs  Lab 07/26/18  0548 07/25/18  0548 07/24/18  1333 07/20/18  0840   WBC 15.6* 18.7* 16.7* 10.6   RBC 3.29* 3.36* 4.24* 3.40*   HGB 9.1* 9.5* 11.6* 9.7*   HCT 27.7* 27.8* 34.8* 29.5*   MCV 84 83 82 87   MCH 27.7 28.3 27.4 28.5   MCHC 32.9 34.2 33.3 32.9   RDW 17.3* 17.0* 16.8* 18.1*   * 577*  583* 704* 574*     CMP  Recent Labs  Lab 07/26/18  0548 07/25/18  1647 07/25/18  0548 07/25/18  0053 07/25/18  0005  07/24/18  1333 07/20/18  0840   * 128* 124*  --  120*  < > 123* 130*   POTASSIUM 3.9 3.2* 3.0* 4.0 4.0  --  3.6 4.3   CHLORIDE 91* 84* 81*  --  75*  --  80* 95   CO2 36* 35* 32  --  32  --  30 29   ANIONGAP 6 8 10  --  14  --  13 7    105* 138*  --  153*  --  138* 114*   BUN 67* 80* 95*  --  98*  --  97* 32*   CR 1.31* 1.60* 2.15*  --  2.66*  --  2.03* 0.85   GFRESTIMATED 57* 45* 32*  --  25*  --  34* >90   GFRESTBLACK 69 55* 39*  --  30*  --  41* >90   ANNAMARIE 8.9 9.5 8.8  --  9.4  --  9.6 8.9   MAG 2.7*  --  2.9*  --  3.0*  --  3.0* 2.1   PHOS 2.4*  --  4.6*  --  6.4*  --  5.5*  --    PROTTOTAL  --   --   --   --   --   --  10.7* 8.6   ALBUMIN  --   --   --   --   --   --  3.2* 2.5*   BILITOTAL  --   --   --   --   --   --  0.4 0.3   ALKPHOS  --   --   --   --   --   --  169* 119   AST  --   --   --   --   --   --  17 21   ALT  --   --   --   --   --   --  17 15   < > = values in this interval not displayed.  INRNo lab results found in last 7 days.    Results for orders placed or performed during the hospital encounter of 07/24/18   XR Abdomen Port 1 View    Narrative    XR ABDOMEN PORT 1 VW  7/24/2018 10:11 PM      HISTORY: ng tube placement;     COMPARISON: CT earlier same day    FINDINGS: Gastric tube is looped in the distal esophagus. Gaseous  distention of the stomach. No pleural effusion. No confluent pulmonary  opacities within the lung bases.      Impression     IMPRESSION: Gastric tube coiled within the distal esophagus which is  repositioned on the subsequent radiograph.    I have personally reviewed the examination and initial interpretation  and I agree with the findings.    KARMEN ODONNELL MD   XR Abdomen Port 1 View    Narrative    Single view of the abdomen  7/24/2018 11:34 PM      HISTORY: NG tube placement    COMPARISON: Radiograph earlier same day    FINDINGS: Gastric tube is repositioned, tip projecting over the  thoracic fundus. Gaseous distention of the stomach, decreased since  prior. No pleural effusion. No confluent pulmonary opacities within  the lung bases.      Impression    IMPRESSION: Gastric tube repositioning, in proper position over the  fundal/body region.    I have personally reviewed the examination and initial interpretation  and I agree with the findings.    KARMEN ODONNELL MD

## 2018-07-26 NOTE — PLAN OF CARE
Problem: Patient Care Overview  Goal: Plan of Care/Patient Progress Review  Outcome: No Change  AVSS. C/O nausea, relieved with 8mg IV zofran. C/O abdominal discomfort relieved with 1 mg IV dilaudid x 4. Fentanyl patch on right deltoid. Pt awake most of the night. NG to low intermittent suction with mL of output. Cycled TPN running. Potassium recheck was 4.0, no further replacements needed. Ostomy scheduled to be changed today per patient. Supplies in pt bin.

## 2018-07-27 NOTE — PROGRESS NOTES
[facility], [place]    Hematology / Oncology Progress Note    Date of Admission: 7/24/2018  Hospital Day #: 3   Date of Service (when I saw the patient): 07/27/2018     Assessment & Plan   Sebas Lopez is a 53 year old male with PMHx significant for ulcerative colitis and sigmoid adenocarcinoma with peritoneal carcinomatosis s/p ileostomy 7/2017 and colonic stent 3/19 (with most recent revision 7/5) who presents from clinic with 3-4 day history of nausea/vomiting and increased abdominal pain, found to have a small bowel obstruction on CT scan.    #Small bowel obstruction. Transition point in LLQ.  #Nausea and vomiting-improved.  #Increased abdominal pain.   Symptoms started about 3-4 days ago. Multiple emesis per day, notes abdominal pain would improve with emesis. Explains emesis appeared similar to ileostomy output in color/consistency. Ileostomy output seemed to remain the same about of output/mildly decrease. No blood in ileostomy output. Tried to keep up with PO fluid intake but could not. Found to have a SBO with dilated loops of small bowel and transition point in LLQ on CT. Additionally labs demonstrated an PAULINA and electrolyte abnormalities (See below).   -NG to LIS for decompression. NPO.  -IVF replacement.  -IV medications as able. Home meds (bowel meds, vitamins, etc) held d/t strict NPO status and NG.   -IV antiemetics to treat sx.  -Medical mgmt at this time.  -Colorectal surgery consulted, ? Any surgical mgmt for SBO. They do not have any intervention to offer and recommend continuing with conservative/medical mgmt. May need to consider venting G tube if does not resolve.  -Palliative care team consulted, patient follows with outpatient. Recommend increasing Fentanyl patch to 75 mg/72hrs and initiated on dilaudid PCA (1.5 mg loading dose f/b 0.5-1 mg boluses q10 minutes).  -7/27 having decreased ostomy output with around 3L of NG output, continue to  monitor.    #Hypokalemia/Hyponatremia/Hypochoremia. Resolved.  #Hypermagnesemia/Hyperphosphatemia. Resolved.  #PAULINA. Cr on admission 2.66. Improving, Cr 1.31 on 7/26. Resolved.  Likely 2/2 above nausea/vomiting/SBO leading to dehydration. Skin with some tenting on AM exam 7/25. On TPN support (see below).   -Replace lytes per protocol prn.   -IVF  ml/hr (+TPN volume), now decreased to 50 ml/hr +TPN volume.  -Daily BMP.    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    Follows with Dr. Long outpatient. S/p ex lap 7/2017 with small bowel resection and end ileostomy. S/p 6 cycles of FOLFOX. Admitted on 12/6/2017 for ex-lap with aborted HIPEC due to diffuse carcinomatosis. Repeat CT scan showed worsening peritoneal carcinomatosis. He was admitted to the hospital with worsening colon distention and colon stent was placed which improved his symptoms. He resumed FOLFOX C7 3/8/18 f/b hospitalization for bowel obstruction and stent replacement 3/19/2018. Continued with FOLFOX, last cycle #13 6/15/18. Repeat CT 6/27 with slightly progressed peritoneal carcinomatosis, increased colonic and rectal fluid favoring colitis. Per Dr. Long's note 6/28 next treatment options would likely be irinotecan and panitumumab. Has been unable to begin d/t recent hospitalizations.   -Will need follow up scheduled at discharge.     #Leukocytosis. No e/o infection at this time.  #Anemia.  Has had a mild leukocytosis on follow up labs in clinic. Admission WBC count 16.7-->elevated to 18.7 7/25. Anemia 2/2 chronic illness.   -BCx drawn 7/25, NGTD.  -Afebrile, monitor at this time. Low threshold to start antibiotics if spikes.  -Will monitor with daily CBC.    # Pain Assessment:  Current Pain Score 7/27/2018   Patient currently in pain? yes   Pain score (0-10) -   Pain location Abdomen   Pain descriptors Aching   - Sebas is experiencing pain due to metastatic sigmoid adenocarcinoma. Pain management was discussed and the  plan was created in a collaborative fashion.  Sebas's response to the current recommendations: engaged  - Opioid regimen: Fentanyl patch 37 mcg/72hrs, IV dilaudid 1-1.5 mg q2hrs prn for breakthrough. HELD PTA PO oxycodone d/t NPO status.  - Bowel regimen: defer d/t SBO and diarrhea through ileostomy. Tincture of opium and imodium held d/t NPO status.    FEN:  -IVF @ 50 ml/hr + TPN volume  -PRN lyte replacement  -NPO, NG in place, continue PTA TPN support    #Severe malnutrition in context of acute illness. >2% weight loss in 1 week, moderate subcutaneous fat loss, muscle loss in temporal/facial/jaw/ and thoracic region.   -Continue TPN support, optimize nutritional support.    Prophy/Misc:  -VTE: heparin subcutaneous q8hrs  -GI/PUD: IV Protonix    Code status: Full    Disposition: Pending improvement in acute issues, likely another 2-4 day stay.     Patient and plan discussed with staff, Dr. Hardin.    Margarita Dougherty PA-C  Hematology/Oncology  Pager #[phone]    Interval History   Sebas is doing okay. Denies nausea/vomiting. Tolerating NG without issue. Continues to have LLQ pain that continues to be bothersome. Maxing out prn dilaudid dosing. States he gets relief but it wears off too quickly. Ambulating in halls frequently.     Physical Exam   Temp: 97  F (36.1  C) Temp src: Axillary BP: 116/81 Pulse: 93 Heart Rate: 91 Resp: 18 SpO2: 98 % O2 Device: None (Room air)    Vitals:    07/25/18 0743 07/26/18 0719 07/27/18 0700   Weight: 64.7 kg (142 lb 9.6 oz) 65 kg (143 lb 3.2 oz) 66.2 kg (145 lb 14.4 oz)     Vital Signs with Ranges  Temp:  [96.4  F (35.8  C)-98  F (36.7  C)] 97  F (36.1  C)  Pulse:  [91-99] 93  Heart Rate:  [90-91] 91  Resp:  [16-18] 18  BP: (113-118)/(73-81) 116/81  SpO2:  [97 %-99 %] 98 %  I/O last 3 completed shifts:  In: 3347.16 [I.V.:900]  Out: 4700 [Urine:1100; Emesis/NG output:3600]    Constitutional: Pleasant male seen sitting up in bed, in NAD. Alert and interactive. Also seen ambulating in halls  independently.  HEENT: NCAT, anicteric sclerae, conjunctiva clear. Moist mucous membranes. NG in place.  Respiratory: Non-labored breathing, good air exchange. Lungs are clear to auscultation bilaterally, without wheezing, crackles or rhonchi. No cough noted.   Cardiovascular: Regular rate and rhythm with no murmur, rub or gallop.  GI: Absent BS. Abdomen is mildly distended, tender to palpation of lower quadrants and LUQ, no guarding. No rebound. No output from ostomy when assessed, stoma appears normal/pink.   Skin: Warm and dry. No rashes or lesions on exposed surfaces.  Musculoskeletal: Extremities grossly normal. No tenderness or edema present.   Neurologic: A &O x3, speech normal, answering questions appropriately. Moves all extremities spontaneously. Grossly non-focal.  Neuropsychiatric: Mentation and affect normal/appropriate.  VAD: PICC is c/d/i with no erythema, drainage, or tenderness.    Medications   Current Facility-Administered Medications   Medication     benzocaine-menthol (CEPACOL) 15-3.6 MG lozenge 1 lozenge     dextrose 10 % 1,000 mL infusion     diphenhydrAMINE (BENADRYL) injection 50 mg     fentaNYL (DURAGESIC) 75 mcg/hr 72 hr patch 1 patch     fentaNYL (DURAGESIC) Patch in Place     fentaNYL (DURAGESIC) patch REMOVAL     heparin sodium PF injection 5,000 Units     HYDROmorphone (DILAUDID) PCA 1 mg/mL OPIOID TOLERANT     lipids (INTRALIPID) 20 % infusion 250 mL     Medication Instruction     naloxone (NARCAN) injection 0.4 mg     ondansetron (ZOFRAN) injection 8 mg     pantoprazole (PROTONIX) 40 mg IV push injection     parenteral nutrition - ADULT compounded formula CYCLE     parenteral nutrition - ADULT compounded formula CYCLE     potassium chloride (KLOR-CON) Packet 20-40 mEq     potassium chloride 10 mEq in 100 mL intermittent infusion with 10 mg lidocaine     potassium chloride 10 mEq in 100 mL sterile water intermittent infusion (premix)     potassium chloride 20 mEq in 50 mL intermittent  infusion     potassium chloride SA (K-DUR/KLOR-CON M) CR tablet 20-40 mEq     prochlorperazine (COMPAZINE) tablet 10 mg    Or     prochlorperazine (COMPAZINE) injection 10 mg     sodium chloride (PF) 0.9% PF flush 3 mL     sodium chloride 0.9% infusion       Data   CBC    Recent Labs  Lab 07/27/18  0538 07/26/18  0548 07/25/18  0548 07/24/18  1333   WBC 18.6* 15.6* 18.7* 16.7*   RBC 3.37* 3.29* 3.36* 4.24*   HGB 9.4* 9.1* 9.5* 11.6*   HCT 29.3* 27.7* 27.8* 34.8*   MCV 87 84 83 82   MCH 27.9 27.7 28.3 27.4   MCHC 32.1 32.9 34.2 33.3   RDW 17.7* 17.3* 17.0* 16.8*   * 613* 577*  583* 704*     CMP  Recent Labs  Lab 07/27/18  0538 07/27/18  0151 07/26/18  1742 07/26/18  0548 07/25/18  1647 07/25/18  0548  07/25/18  0005  07/24/18  1333     --  136 132* 128* 124*  --  120*  < > 123*   POTASSIUM 4.2 4.3 3.3* 3.9 3.2* 3.0*  < > 4.0  --  3.6   CHLORIDE 100  --  95 91* 84* 81*  --  75*  --  80*   CO2 31  --  34* 36* 35* 32  --  32  --  30   ANIONGAP 7  --  7 6 8 10  --  14  --  13   *  --  83 144 105* 138*  --  153*  --  138*   BUN 47*  --  54* 67* 80* 95*  --  98*  --  97*   CR 1.01  --  1.18 1.31* 1.60* 2.15*  --  2.66*  --  2.03*   GFRESTIMATED 77  --  64 57* 45* 32*  --  25*  --  34*   GFRESTBLACK >90  --  78 69 55* 39*  --  30*  --  41*   ANNAMARIE 8.9  --  9.2 8.9 9.5 8.8  --  9.4  --  9.6   MAG 2.3  --   --  2.7*  --  2.9*  --  3.0*  --  3.0*   PHOS 3.0  --   --  2.4*  --  4.6*  --  6.4*  --  5.5*   PROTTOTAL  --   --   --   --   --   --   --   --   --  10.7*   ALBUMIN  --   --   --   --   --   --   --   --   --  3.2*   BILITOTAL  --   --   --   --   --   --   --   --   --  0.4   ALKPHOS  --   --   --   --   --   --   --   --   --  169*   AST  --   --   --   --   --   --   --   --   --  17   ALT  --   --   --   --   --   --   --   --   --  17   < > = values in this interval not displayed.  INRNo lab results found in last 7 days.    Results for orders placed or performed during the hospital encounter of  07/24/18   XR Abdomen Port 1 View    Narrative    XR ABDOMEN PORT 1 VW  7/24/2018 10:11 PM      HISTORY: ng tube placement;     COMPARISON: CT earlier same day    FINDINGS: Gastric tube is looped in the distal esophagus. Gaseous  distention of the stomach. No pleural effusion. No confluent pulmonary  opacities within the lung bases.      Impression    IMPRESSION: Gastric tube coiled within the distal esophagus which is  repositioned on the subsequent radiograph.    I have personally reviewed the examination and initial interpretation  and I agree with the findings.    KARMEN ODONNELL MD   XR Abdomen Port 1 View    Narrative    Single view of the abdomen  7/24/2018 11:34 PM      HISTORY: NG tube placement    COMPARISON: Radiograph earlier same day    FINDINGS: Gastric tube is repositioned, tip projecting over the  thoracic fundus. Gaseous distention of the stomach, decreased since  prior. No pleural effusion. No confluent pulmonary opacities within  the lung bases.      Impression    IMPRESSION: Gastric tube repositioning, in proper position over the  fundal/body region.    I have personally reviewed the examination and initial interpretation  and I agree with the findings.    KARMEN ODONNELL MD

## 2018-07-27 NOTE — CONSULTS
[facility], [place]  Palliative Care Consultation Note    Patient: Sebas Lopez  Date of Admission:  7/24/2018    Requesting provider/team: Margarita Dougherty PA-C with Heme/Onc  Reason for consult: Pain management    Recommendations:  -Fentanyl patch 75 mcg/hr  -Hydromorphone PCA 0.5-1 mg q 10 minute with a 1.5 mg loading dose   -Will involve Palliative  for additional emotional and spiritual support       These recommendations have been discussed with Margarita Dougherty PA-C.    Thank you for the opportunity to participate in the care of this patient and family. Our team will follow. Please feel free to contact the on-call Palliative provider with any urgent needs.    ALEXA Corbin CNP  Palliative Care Consult Team  Pager: [phone]    [facility] Inpatient Team Consult pager [phone] (M-F 8-4:30)  After-hours Answering Service [phone]   Palliative Clinic: [phone]     75 minutes spent with patient, with >50% counseling and in care coordination.    Assessment:  Sebas Lopez is a 53 year old male with history of ulcerative colitis now with peritoneal carcinomatosis from sigmoid adenocarcinoma, s/p ileostomy in 7/17 and colonic stent placement 3/19 who presented with increased abdominal pain, nausea, and vomiting secondary to SBO. He is being medically managed at this time and surgery likely will not be able to offer a surgical solution. Palliative was consulted for pain management.     Symptoms:   Pain--abdominal, 2/2 SBO. PTA was on Fentanyl 37 mcg/hr patch and Oxycodone 20-30 mg q4h prn.     Goals of Care: to pursue more chemo. He is aware he will not be cured, but hopes for more treatment to give him the best quality of life possible.     Social:         Living situation: house in [place] with S.O. Bessie       Support system: Bessie, parents, great-nephew, friends        Functional status: fair--independent with cares but weakness limits activity        Hobbies:  gardening, travel    Mental Health: reports his mood/spirits are mostly good; remains hopeful    Coping: trying to live his best quality of life despite critical illness and poor prognosis    Spiritual/Rastafarian:    Spiritual background: [place]; notes he is more spiritual than Rastafarian at this point  Spiritual needs: would welcome a visit from Chaplaincy and is interested in meditation and guided imagery     Advance Care Planning:              Decision making capacity: intact        Disease understanding: good       Health care directive: not on file       Health care agent: none named       Code Status:  Full Code       POLST: not on file    History of Present Illness   Sources of History: patient and electronic health record    History of ulcerative colities s/p ileostomy, and sigmoid adenocarcinoma with peritoneal carcinomatosis s/p colonic stent, completed chemo cycle in June. Started having increased abdominal pain, nausea, vomiting a couple of days ago; was seen in clinic where CT showed evidence of SBO. He was admitted for management. This is his second bowel obstruction in the past couple of months.     Palliative was consulted for help with pain control. He is able to control pain with fentanyl patch 37 mcg/hr and IV dilaudid (19 mg in the past day). He expresses desire to decrease his opioid doses if possible, but seems to understand he may need extra to get through this event.    Mr. Lopez is known to Dr. Trujillo from outpatient palliative clinic where he has been seen for pain.     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     Ulcerative colitis (H)           Past Surgical History:   Past Surgical History:   Procedure Laterality Date     COLONOSCOPY  2017     COLONOSCOPY N/A 11/30/2017    Procedure: COLONOSCOPY;  Colonoscopy;  Surgeon: Rob Dudley MD;  [facility]     COLONOSCOPY N/A 2/6/2018    Procedure: COMBINED COLONOSCOPY, STENT PLACEMENT;  COMBINED COLONOSCOPY with Colonic Stent Placement;  Surgeon: Guru Lionel Mar MD;  [facility]     COLONOSCOPY N/A 3/19/2018    Procedure: COMBINED COLONOSCOPY, STENT PLACEMENT;  flexible sigmoidoscopy with stent placement and dilation;  Surgeon: Alexis Rios MD;  [facility]     COLONOSCOPY N/A 7/5/2018    Procedure: COMBINED COLONOSCOPY, STENT PLACEMENT;  flexible sigmoidoscopy with colonic stent placement ;  Surgeon: Alexis iRos MD;  [facility]     GI SURGERY  07/10/2017    Extensive lysis of adhesions, small bowel resection with end ileostomy.      HERNIA REPAIR       INSERT PORT VASCULAR ACCESS Right 8/10/2017    Procedure: INSERT PORT VASCULAR ACCESS;  Single Lumen Right Chest Power Port;  Surgeon: Malena Andrew PA-C;  [facility]     LAPAROSCOPY DIAGNOSTIC (GENERAL) N/A 12/6/2017    Procedure: LAPAROSCOPY DIAGNOSTIC (GENERAL);  Diagnostic Laparoscopy, Exploratory Laparotomy Anesthesia Block ;  Surgeon: Rob Dudley MD;  [facility]     LAPAROTOMY EXPLORATORY N/A 12/6/2017    Procedure: LAPAROTOMY EXPLORATORY;;  Surgeon: Rob Dudley MD;  [facility]     ORTHOPEDIC SURGERY Left     HIP ARTHROPLASTY     PIC  INSERTION Right 02/23/2018    5Fr DL BioFlo PICC, 44cm (3cm external) in the R basilic vein w/ tip in the SVC RA junction             Family History:   Family History   Problem Relation Age of Onset     Hypertension Father      Diabetes Father            Allergies:   Allergies   Allergen Reactions     Liquid Adhesive Rash     Dermabond, rash with pustules, occurred with abdominal surgery and port removal             Medications:   I have reviewed this patient's medication profile and medications given in the past 24 hours.    Fentanyl 37 mcg/hr patch   Hydromorphone 1.5-3 mg IV q2h prn--19 mg/24 hrs          Physical Exam:   Vital Signs: Temp: 97  F (36.1  C) Temp src: Axillary BP: 116/81 Pulse: 93 Heart Rate: 91 Resp: 18 SpO2: 98 % O2 Device: None (Room air)    Weight: 145 lbs 14.4 oz    Constitutional: Pleasant male, seen sitting in hospital bed, in no acute distress.   Head: Normocephalic. Atraumatic. MMM.   Extremities: Warm and well perfused. No edema.  Pulm: Non-labored breathing, on room air. Speaking in complete sentences.    Musculoskeletal: CONNOLLY. No obvious malformations.   Skin: No jaundice. No concerning rashes or lesions on exposed areas.   Neuro: A/O x 4. Face symmetrical. PERRL. EOMI.   Psych: Speech clear. Affect appropriate. Memory and insight intact.       Data reviewed:     CMP  Recent Labs  Lab 07/27/18  0538 07/27/18  0151 07/26/18  1742 07/26/18  0548 07/25/18  1647 07/25/18  0548  07/25/18  0005  07/24/18  1333     --  136 132* 128* 124*  --  120*  < > 123*   POTASSIUM 4.2 4.3 3.3* 3.9 3.2* 3.0*  < > 4.0  --  3.6   CHLORIDE 100  --  95 91* 84* 81*  --  75*  --  80*   CO2 31  --  34* 36* 35* 32  --  32  --  30   ANIONGAP 7  --  7 6 8 10  --  14  --  13   *  --  83 144 105* 138*  --  153*  --  138*   BUN 47*  --  54* 67* 80* 95*  --  98*  --  97*   CR 1.01  --  1.18 1.31* 1.60* 2.15*  --  2.66*  --  2.03*   GFRESTIMATED 77  --  64 57* 45* 32*  --  25*  --  34*   GFRESTBLACK >90  --   78 69 55* 39*  --  30*  --  41*   ANNAMARIE 8.9  --  9.2 8.9 9.5 8.8  --  9.4  --  9.6   MAG 2.3  --   --  2.7*  --  2.9*  --  3.0*  --  3.0*   PHOS 3.0  --   --  2.4*  --  4.6*  --  6.4*  --  5.5*   PROTTOTAL  --   --   --   --   --   --   --   --   --  10.7*   ALBUMIN  --   --   --   --   --   --   --   --   --  3.2*   BILITOTAL  --   --   --   --   --   --   --   --   --  0.4   ALKPHOS  --   --   --   --   --   --   --   --   --  169*   AST  --   --   --   --   --   --   --   --   --  17   ALT  --   --   --   --   --   --   --   --   --  17   < > = values in this interval not displayed.  CBC  Recent Labs  Lab 07/27/18  0538 07/26/18  0548 07/25/18  0548 07/24/18  1333   WBC 18.6* 15.6* 18.7* 16.7*   RBC 3.37* 3.29* 3.36* 4.24*   HGB 9.4* 9.1* 9.5* 11.6*   HCT 29.3* 27.7* 27.8* 34.8*   MCV 87 84 83 82   MCH 27.9 27.7 28.3 27.4   MCHC 32.1 32.9 34.2 33.3   RDW 17.7* 17.3* 17.0* 16.8*   * 613* 577*  583* 704*

## 2018-07-27 NOTE — PROGRESS NOTES
Care Coordinator Progress Note    Admission Date/Time:  7/24/2018  Attending MD:  Kaden Hardin, *    Data  Chart reviewed, discussed with interdisciplinary team.     Concerns with insurance coverage for discharge needs: None.  Current Living Situation: Patient lives with family.  Support System: Supportive and Involved  Services Involved: [facility] providing home TPN; Located within [facility] (direct intake ph: [phone]) providing home RN visits   Transportation at Discharge: Family or friend will provide  Transportation to Medical Appointments:   - Not applicable  Barriers to Discharge: none anticipated    Coordination of Care and Referrals:  RNCC met with patient but due to his pain and discomfort our visit was brief. Patient would like to resume home TPN through [facility], with home RN visits contracted through [facility]. Both PETER and Shira at [facility] updated on current hospitalization and undetermined discharge timeframe.     Assessment  Patient with hx of ulcerative colitis and sigmoid adenocarcinoma with peritoneal carcinomatosis s/p ileostomy 7/2017 and colonic stent 3/19 (with most recent revision 7/5) who presents from clinic with 3-4 day history of nausea/vomiting and increased abdominal pain, found to have a small bowel obstruction on CT scan. NG in place and patient receiving IV dilaudid for pain control. On home TPN regimen.     Plan  Anticipated Discharge Date:  No DC expected over weekend  Anticipated Discharge Plan:  home    Karime Junior  7D Heme/Onc RN Care Coordinator  Pager [phone]

## 2018-07-27 NOTE — PLAN OF CARE
Problem: Patient Care Overview  Goal: Plan of Care/Patient Progress Review  Outcome: No Change    VSS. Abdominal pain; IV dilaudid increased to 2mg Q2 hours. Fentanyl patches on right arm. NG to LIS; 1600 mL green output. Ostomy with minimal output. On cycled TPN. NPO with ice chips. Lozenges for sore throat. Potassium replaced for 3.3; recheck at 0200. Continue with plan of care.

## 2018-07-27 NOTE — DISCHARGE INSTRUCTIONS
D/C'ing nurse: Please fax to following agencies at time of patient d/c.    1) [facility]      Fax  [phone]  2)  [facility]       Fax  [phone]  ____________________________________________________________

## 2018-07-27 NOTE — PLAN OF CARE
Problem: Patient Care Overview  Goal: Plan of Care/Patient Progress Review  Outcome: No Change  00:00-07:00 am  AF stable VSS  Continue c/o mid-left abdominal pain and requested Dilaudid 2 mg IV q 2-3 with adequate pain relief  Appears to be resting/sleeping comfortably between doses  Ostomy intact with liquid stool approx 150 ml and pt takes care of own ostomy  Zofran IV given x 1 without emesis   NG to  ml and pt also taking ice chips  Cycled TPN and IL infusing  Potassium rechecked at 02:00 am WNL  Voiding spontaneously well  Up independently  Pt otherwise no acute event overnight  Continue w/POC

## 2018-07-27 NOTE — CONSULTS
Colon and Rectal Surgery Consultation Note  [facility]    Sebas Lopez MRN# [id number]   Age: 53 year old YOB: 1965     Date of Admission:  7/24/2018    Reason for consult: surgical intervention of SBO, sigmoid adenocarcinoma w/ peritoneal carcinomatosis s/p end ileostomy and hx of multiple colonic stents.       Requesting physician: Margarita Dougherty PA-C        Level of consult: Consult, follow and place orders           Assessment:   54yo M with h/o UC, s/p ileostomy 7/2017, and sigmoid adenocarcinoma with peritoneal carcinomatosis s/p colonic stent x3 3/2018 and 7/2018, admitted to Heme/Onc on 7/24/2018 with PAULINA, abdominal pain, nausea, vomiting 3-4 days prior to admission. During most recent hospitalization GI colonoscopy findings on 7/5/2018 with 2 colonic stents with progressive tumor ingrowth and partially obstructed by turn of sigmoid, relieved with additional stent placement. Patient on chronic TPN. CT on admission with SBO and NGT placement. NGT having high output. Continued ostomy output suggests not completely obstructed.            Recommendations:   -No surgery intervention, agree with current conservative management  -Agree with NPO   -Continue TPN  -NGT to LIS  -Monitor I&O  -Encourage ambulation  -If not resolving, consider IR vs GI for G-tube placement    Please call if questions.    Patient discussed with Dr. Ramos.     Elly Kyle,   General Surgery PGY1  Colon and Rectal Surgery Service  Pager: [phone]           History of Present Illness:   CC: Abdominal Pain, Nausea, Vomiting, Dehydration     History is obtained from the patient and chart.     54yo M with h/o UC, s/p ileostomy 7/2017, and sigmoid adenocarcinoma with peritoneal carcinomatosis s/p colonic stent x3 3/2018 and 7/2018, admitted to Heme/Onc on 7/24/2018 with PAULINA, abdominal pain, nausea, vomiting 3-4 days prior to admission. Patient on chronic TPN, however was eating  solid foods 3-4 days prior to admission before started to have multiple episodes of bilious emesis up to 3x daily and decreasing stoma output. Home antiemetics did not provide relief. Describes abdominal pain as diffuse and bloated which is relieved with vomiting and stoma output. Has night sweats, no documented fevers. Admits to intentional decreased fluid intake to decrease to his dry weight resulting in dehydration and PAULINA. Has had ~10lb weight loss. Last cycle of chemo was 6/15, with plan to continue therapy.    CT Abd/Pelvis on admission revealed SBO. Heme/onc has been managing conservatively with NGT decompression with output ~3L daily for the last 2 days. Ostomy output documented on Day 2 ~1.6L, patient does endorse decreased ostomy output in bag today. Has noted change in baseline liquid, brown stool to bright green clear ostomy output over the last day.              Past Medical History:     Past Medical History:   Diagnosis Date     Adenocarcinoma of sigmoid colon (H)     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     Ulcerative colitis (H)              Past Surgical History:     Past Surgical History:   Procedure Laterality Date     COLONOSCOPY  2017     COLONOSCOPY N/A 11/30/2017    Procedure: COLONOSCOPY;  Colonoscopy;  Surgeon: Rob Dudley MD;  [facility]     COLONOSCOPY N/A 2/6/2018    Procedure: COMBINED COLONOSCOPY, STENT PLACEMENT;  COMBINED COLONOSCOPY with Colonic Stent Placement;  Surgeon: Guru Lionel Mar MD;  [facility]     COLONOSCOPY N/A 3/19/2018    Procedure: COMBINED COLONOSCOPY, STENT PLACEMENT;  flexible sigmoidoscopy with stent placement and dilation;  Surgeon: Alexis Rios MD;  [facility]     COLONOSCOPY N/A 7/5/2018    Procedure:  COMBINED COLONOSCOPY, STENT PLACEMENT;  flexible sigmoidoscopy with colonic stent placement ;  Surgeon: Alexis Rios MD;  [facility]     GI SURGERY  07/10/2017    Extensive lysis of adhesions, small bowel resection with end ileostomy.      HERNIA REPAIR       INSERT PORT VASCULAR ACCESS Right 8/10/2017    Procedure: INSERT PORT VASCULAR ACCESS;  Single Lumen Right Chest Power Port;  Surgeon: Malena Andrew PA-C;  [facility]     LAPAROSCOPY DIAGNOSTIC (GENERAL) N/A 12/6/2017    Procedure: LAPAROSCOPY DIAGNOSTIC (GENERAL);  Diagnostic Laparoscopy, Exploratory Laparotomy Anesthesia Block ;  Surgeon: Rob Dudley MD;  [facility]     LAPAROTOMY EXPLORATORY N/A 12/6/2017    Procedure: LAPAROTOMY EXPLORATORY;;  Surgeon: Rob Dudley MD;  [facility]     ORTHOPEDIC SURGERY Left     HIP ARTHROPLASTY     PICC INSERTION Right 02/23/2018    5Fr DL BioFlo PICC, 44cm (3cm external) in the R basilic vein w/ tip in the SVC RA junction             Social History:     Social History     Social History     Marital status: Single     Spouse name: N/A     Number of children: N/A     Years of education: N/A     Occupational History     Not on file.     Social History Main Topics     Smoking status: Never Smoker     Smokeless tobacco: Never Used     Alcohol use 3.0 oz/week     5 Cans of beer per week      Comment: none for last 4 months     Drug use: No     Sexual activity: Not on file     Other Topics Concern     Not on file     Social History Narrative             Family History:     Family History   Problem Relation Age of Onset     Hypertension Father      Diabetes Father              Allergies:      Allergies   Allergen Reactions     Liquid Adhesive Rash     Dermabond, rash with pustules, occurred with abdominal surgery and port removal              Medications:     Current Facility-Administered Medications on File Prior to Encounter:  [DISCONTINUED] ondansetron (ZOFRAN)  injection 8 mg     Current Outpatient Prescriptions on File Prior to Encounter:  amylase-lipase-protease (CREON) 90897 UNITS CPEP Take 2 capsules (72,000 Units) by mouth 3 times daily (with meals) And 1 capsule with each snack tid.   cyanocobalamin (VITAMIN  B-12) 1000 MCG tablet Take 1,000 mcg by mouth daily   dronabinol (MARINOL) 5 MG capsule Take 1 capsule (5 mg) by mouth 2 times daily (before meals)   fentaNYL (DURAGESIC) 25 mcg/hr 72 hr patch Place 1 patch onto the skin every 72 hours remove old patch.   LORazepam (ATIVAN) 0.5 MG tablet Take 1 tablet (0.5 mg) by mouth every 4 hours as needed (Anxiety, Nausea/Vomiting or Sleep)   LORazepam (ATIVAN) 0.5 MG tablet Take 1 tablet (0.5 mg) by mouth every 4 hours as needed (Anxiety, Nausea/Vomiting or Sleep)   Multiple Vitamins-Minerals (MULTIVITAMIN & MINERAL PO) Take 1 tablet by mouth daily   NIACIN PO Take by mouth daily   omeprazole (PRILOSEC) 20 MG CR capsule Take 1 capsule (20 mg) by mouth daily   ondansetron (ZOFRAN-ODT) 8 MG ODT tab Take 1 tablet (8 mg) by mouth every 8 hours as needed for nausea   opium tincture 10 MG/ML (1%) liquid Take 0.6 mLs (6 mg) by mouth 4 times daily   oxyCODONE IR (ROXICODONE) 20 MG TABS immediate release tablet Take 20-30 mg by mouth every 4 hours as needed for moderate to severe pain Take 20-30 mg every 4 hours as needed for moderate to severe pain   oxyCODONE IR (ROXICODONE) 5 MG tablet Take 15-25 mg q4 hrs as needed for moderate to severe pain (can take with 10 mg tabs of previous prescription to make 15 mg or 25 mg doses).   parenteral nutrition - PTA/DISCHARGE ORDER Inject into the vein daily [facility]   prochlorperazine (COMPAZINE) 10 MG tablet Take 1 tablet (10 mg) by mouth every 6 hours as needed (Nausea/Vomiting)   sodium chloride 0.9% SOLN BOLUS Inject 1,000 mLs into the vein daily as needed For hydration.   FentaNYL 37.5 MCG/HR PT72 Place 37 mcg/hr onto the skin every 72 hours   loperamide (IMODIUM) 2 MG capsule 2 caps at 1st  "sign of diarrhea & 1 cap every 2hrs until 12hrs diarrhea free. During night, 2 caps at bedtime & 2 caps every 4hrs until AM (Patient not taking: Reported on 7/20/2018)   naloxone (NARCAN) nasal spray Spray 1 spray (4 mg) into one nostril alternating nostrils as needed for opioid reversal every 2-3 minutes until assistance arrives (Patient not taking: Reported on 7/10/2018)   ondansetron (ZOFRAN) 8 MG tablet Take 1 tablet (8 mg) by mouth every 8 hours as needed (Nausea/Vomiting)             Review of Systems:      All other review of systems negative, except for what is mentioned above        Physical Exam:   /81 (BP Location: Left arm)  Pulse 93  Temp 97  F (36.1  C) (Axillary)  Resp 18  Ht 1.753 m (5' 9\")  Wt 66.2 kg (145 lb 14.4 oz)  SpO2 98%  BMI 21.55 kg/m2     I/O last 3 completed shifts:  In: 3347.16 [I.V.:900]  Out: 4700 [Urine:1100; Emesis/NG output:3600]    General: Alert, interactive, NAD  Resp: nonlabored on room air  Cardiac: regular rate  Abdomen: Soft, minimally tender, midly distended, thick midline scarring, ostomy with bright green liquid clear output. NGT in place with no output noted in canister  Extremities: wwp, bilateral lower extremity edema  Neuro: alert and oriented            Data:   All laboratory and imaging data in the past 24 hours reviewed    Recent Labs  Lab 07/27/18  0538 07/27/18  0151 07/26/18  1742 07/26/18  0548 07/25/18  1647 07/25/18  0548  07/25/18  0005     --  136 132* 128* 124*  --  120*   POTASSIUM 4.2 4.3 3.3* 3.9 3.2* 3.0*  < > 4.0   CHLORIDE 100  --  95 91* 84* 81*  --  75*   CO2 31  --  34* 36* 35* 32  --  32   BUN 47*  --  54* 67* 80* 95*  --  98*   CR 1.01  --  1.18 1.31* 1.60* 2.15*  --  2.66*   *  --  83 144 105* 138*  --  153*   MAG 2.3  --   --  2.7*  --  2.9*  --  3.0*   PHOS 3.0  --   --  2.4*  --  4.6*  --  6.4*   < > = values in this interval not displayed.    Recent Labs  Lab 07/27/18  0538 07/26/18  0548 07/25/18  0548 " 07/24/18  1333   WBC 18.6* 15.6* 18.7* 16.7*   HGB 9.4* 9.1* 9.5* 11.6*   HCT 29.3* 27.7* 27.8* 34.8*   * 613* 577*  583* 704*     7/24/2018 CT Abd/Pelvis      IMPRESSION:   In this patient with history of sigmoid adenocarcinoma with extensive  peritoneal carcinomatosis.  1. New dilated loops of small bowel measuring up to 5 cm with upstream  fluid retention in the stomach. The transition point is in the left  lower quadrant. Precise anatomy is difficult to discern due to lack of  IV contrast. Findings consistent with small bowel obstruction. No  evidence of perforation or significant mesenteric stranding.  2. New third colonic stent on the already existing colonic stents.  Decreased fluid in the colon.  3. Poorly characterized, not significantly changed sigmoid colon mass  and peritoneal carcinomatosis.      7/5/2018 GI Colonoscopy                                                                         Impression:          - Mostly stable position of two full expanded in situ                        colonic stents, now found to have both progressive tumor                        ingrowth and their proximal ends partially obstructed by                        a turn of the sigmoid                        - Successful deployment of a third uncovered metal                        colonic stent with excellent position and drainage                        immediately achieved   Recommendation:      - Standard inpatient general anesthesia recovery with                        return to the floor when appropriate                        - No interval endoscopy planned at this juncture                        - Given ostomy, oral intake may resume as previous when                        the patient desires                        - The findings and recommendations were discussed with                        the patient and their family      Staff Addendum:  Agree with the consultation H&P as documented by the housestaff. I  was personally involved with the recommendations made by our service for this patient.  Katherine Ramos MD  Colon and Rectal Surgery Staff  [facility]

## 2018-07-28 NOTE — PROGRESS NOTES
[facility], [place]    Hematology / Oncology Progress Note    Date of Admission: 7/24/2018  Hospital Day #: 4   Date of Service (when I saw the patient): 07/28/2018     Assessment & Plan   Sebas Lopez is a 53 year old male with PMHx significant for ulcerative colitis and sigmoid adenocarcinoma with peritoneal carcinomatosis s/p ileostomy 7/2017 and colonic stent 3/19 (with most recent revision 7/5) who presents from clinic with 3-4 day history of nausea/vomiting and increased abdominal pain, found to have a small bowel obstruction on CT scan.    Plan  -switch to enoxaparin for DVT prophy  -continue NG LIS, if obstruction not resolving, consider venting G over next few days  -monitor WBC    #Small bowel obstruction. Transition point in LLQ.  #Nausea and vomiting-improved.  #Increased abdominal pain.   Symptoms started about 3-4 days ago. Multiple emesis per day, notes abdominal pain would improve with emesis. Explains emesis appeared similar to ileostomy output in color/consistency. Ileostomy output seemed to remain the same about of output/mildly decrease. No blood in ileostomy output. Tried to keep up with PO fluid intake but could not. Found to have a SBO with dilated loops of small bowel and transition point in LLQ on CT. Additionally labs demonstrated an PAULINA and electrolyte abnormalities (See below).   -NG to LIS for decompression. NPO.  -IVF replacement.  -IV medications as able. Home meds (bowel meds, vitamins, etc) held d/t strict NPO status and NG.   -IV antiemetics to treat sx.  -Medical mgmt at this time.  -Colorectal surgery consulted, ? Any surgical mgmt for SBO. They do not have any intervention to offer and recommend continuing with conservative/medical mgmt. May need to consider venting G tube if does not resolve.  -Palliative care team consulted, patient follows with outpatient. Recommend increasing Fentanyl patch to 75 mg/72hrs and initiated on dilaudid PCA (1.5  mg loading dose f/b 0.5-1 mg boluses q10 minutes).  -7/27 having decreased ostomy output with around 3L of NG output, continue to monitor.    #Hypokalemia/Hyponatremia/Hypochoremia. Resolved.  #Hypermagnesemia/Hyperphosphatemia. Resolved.  #PAULINA. Cr on admission 2.66. Improving, Cr 1.31 on 7/26. Resolved.  Likely 2/2 above nausea/vomiting/SBO leading to dehydration. Skin with some tenting on AM exam 7/25. On TPN support (see below).   -Replace lytes per protocol prn.   -IVF  ml/hr (+TPN volume), now decreased to 50 ml/hr +TPN volume.  -Daily BMP.    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    Follows with Dr. Long outpatient. S/p ex lap 7/2017 with small bowel resection and end ileostomy. S/p 6 cycles of FOLFOX. Admitted on 12/6/2017 for ex-lap with aborted HIPEC due to diffuse carcinomatosis. Repeat CT scan showed worsening peritoneal carcinomatosis. He was admitted to the hospital with worsening colon distention and colon stent was placed which improved his symptoms. He resumed FOLFOX C7 3/8/18 f/b hospitalization for bowel obstruction and stent replacement 3/19/2018. Continued with FOLFOX, last cycle #13 6/15/18. Repeat CT 6/27 with slightly progressed peritoneal carcinomatosis, increased colonic and rectal fluid favoring colitis. Per Dr. Long's note 6/28 next treatment options would likely be irinotecan and panitumumab. Has been unable to begin d/t recent hospitalizations.   -Will need follow up scheduled at discharge.     #Leukocytosis. No e/o infection at this time.  #Anemia.  Has had a mild leukocytosis on follow up labs in clinic. Admission WBC count 16.7-->elevated to 18.7 7/25. Anemia 2/2 chronic illness.   -BCx drawn 7/25, NGTD.  -Afebrile, monitor at this time. Low threshold to start antibiotics if spikes.  -Will monitor with daily CBC.    # Pain Assessment:  Current Pain Score 7/27/2018   Patient currently in pain? denies   Pain score (0-10) -   Pain location -   Pain  descriptors -   - Sebas is experiencing pain due to metastatic sigmoid adenocarcinoma. Pain management was discussed and the plan was created in a collaborative fashion.  Sebas's response to the current recommendations: engaged  - Opioid regimen: Fentanyl patch 37 mcg/72hrs, IV dilaudid 1-1.5 mg q2hrs prn for breakthrough. HELD PTA PO oxycodone d/t NPO status.  - Bowel regimen: defer d/t SBO and diarrhea through ileostomy. Tincture of opium and imodium held d/t NPO status.    FEN:  -IVF @ 50 ml/hr + TPN volume  -PRN lyte replacement  -NPO, NG in place, continue PTA TPN support, sips and limited popsicles for comfort    #Severe malnutrition in context of acute illness. >2% weight loss in 1 week, moderate subcutaneous fat loss, muscle loss in temporal/facial/jaw/ and thoracic region.   -Continue TPN support, optimize nutritional support.    Prophy/Misc:  -VTE: enoxaparin 40mg q24h 7/28 due to normalized renal function  -GI/PUD: IV Protonix    Code status: Full    Disposition: Pending improvement in acute issues, likely another 2-4 day stay.     Patient and plan discussed with staff, Dr. Hardin.    Morgan HallGaltBilly Becerril MD, PhD   Hem/onc fellow    Interval History   Nursing notes reviewed, small amounts of ostomy output, significant NG output much from PO intake.    Today, Sebas reports doing ok with interval improvement in abd pain. Continues to ambulate without issue. Did ask about nutrition options if ongoing obstruction.    Minimal, tolerable throat discomfort from NG.     Denies HA, light headedness, CP, SOB, dysuria, edema.    Physical Exam   Temp: 96.4  F (35.8  C) Temp src: Oral BP: 120/75 Pulse: 97 Heart Rate: 98 Resp: 20 SpO2: 98 % O2 Device: None (Room air)    Vitals:    07/25/18 0743 07/26/18 0719 07/27/18 0700   Weight: 64.7 kg (142 lb 9.6 oz) 65 kg (143 lb 3.2 oz) 66.2 kg (145 lb 14.4 oz)     Vital Signs with Ranges  Temp:  [96.4  F (35.8  C)-98.3  F (36.8  C)] 96.4  F (35.8  C)  Pulse:  [93-97]  97  Heart Rate:  [98] 98  Resp:  [18-20] 20  BP: (116-126)/(75-82) 120/75  SpO2:  [98 %] 98 %  I/O last 3 completed shifts:  In: 3261.2 [I.V.:1220]  Out: 4325 [Urine:1025; Emesis/NG output:2750; Stool:550]    Constitutional: Pleasant male seen sitting up in bed, in NAD. Alert and interactive.  HEENT: NCAT, anicteric sclerae, conjunctiva clear. Moist mucous membranes. NG in place.  Respiratory: Non-labored breathing, good air exchange. Lungs are clear to auscultation bilaterally, without wheezing, crackles or rhonchi. No cough noted.   Cardiovascular: Regular rate and rhythm with no murmur, rub or gallop.  GI: Absent BS. Abdomen is mildly distended, minimally tender to palpation of lower quadrants and LUQ, no guarding. No rebound. No output from ostomy when assessed but patient had just drained it, stoma appears normal/pink.   Skin: Warm and dry. No rashes or lesions on exposed surfaces.  Musculoskeletal: Extremities grossly normal. No tenderness or edema present.   Neurologic: Alert, oriented to situation, speech normal, answering questions appropriately. Moves all extremities spontaneously. Grossly non-focal.  Neuropsychiatric: Mentation and affect normal/appropriate.  VAD: PICC is c/d/i with no erythema, drainage, or tenderness.    Medications   Current Facility-Administered Medications   Medication     benzocaine-menthol (CEPACOL) 15-3.6 MG lozenge 1 lozenge     dextrose 10 % 1,000 mL infusion     diphenhydrAMINE (BENADRYL) injection 50 mg     fentaNYL (DURAGESIC) 75 mcg/hr 72 hr patch 1 patch     fentaNYL (DURAGESIC) Patch in Place     fentaNYL (DURAGESIC) patch REMOVAL     heparin sodium PF injection 5,000 Units     HYDROmorphone (DILAUDID) PCA 1 mg/mL OPIOID TOLERANT     lipids (INTRALIPID) 20 % infusion 250 mL     Medication Instruction     naloxone (NARCAN) injection 0.4 mg     ondansetron (ZOFRAN) injection 8 mg     pantoprazole (PROTONIX) 40 mg IV push injection     parenteral nutrition - ADULT compounded  formula CYCLE     potassium chloride (KLOR-CON) Packet 20-40 mEq     potassium chloride 10 mEq in 100 mL intermittent infusion with 10 mg lidocaine     potassium chloride 10 mEq in 100 mL sterile water intermittent infusion (premix)     potassium chloride 20 mEq in 50 mL intermittent infusion     potassium chloride SA (K-DUR/KLOR-CON M) CR tablet 20-40 mEq     prochlorperazine (COMPAZINE) tablet 10 mg    Or     prochlorperazine (COMPAZINE) injection 10 mg     sodium chloride (PF) 0.9% PF flush 3 mL     sodium chloride 0.9% infusion       Data   CBC    Recent Labs  Lab 07/28/18  0602 07/27/18  0538 07/26/18  0548 07/25/18  0548   WBC 21.6* 18.6* 15.6* 18.7*   RBC 3.14* 3.37* 3.29* 3.36*   HGB 8.7* 9.4* 9.1* 9.5*   HCT 27.6* 29.3* 27.7* 27.8*   MCV 88 87 84 83   MCH 27.7 27.9 27.7 28.3   MCHC 31.5 32.1 32.9 34.2   RDW 17.9* 17.7* 17.3* 17.0*   * 626* 613* 577*  583*     CMP  Recent Labs  Lab 07/28/18  0602 07/27/18  0538 07/27/18  0151 07/26/18  1742 07/26/18  0548  07/25/18  0548  07/24/18  1333    139  --  136 132*  < > 124*  < > 123*   POTASSIUM 4.2 4.2 4.3 3.3* 3.9  < > 3.0*  < > 3.6   CHLORIDE 102 100  --  95 91*  < > 81*  < > 80*   CO2 30 31  --  34* 36*  < > 32  < > 30   ANIONGAP 6 7  --  7 6  < > 10  < > 13   * 113*  --  83 144  < > 138*  < > 138*   BUN 40* 47*  --  54* 67*  < > 95*  < > 97*   CR 0.97 1.01  --  1.18 1.31*  < > 2.15*  < > 2.03*   GFRESTIMATED 81 77  --  64 57*  < > 32*  < > 34*   GFRESTBLACK >90 >90  --  78 69  < > 39*  < > 41*   ANNAMARIE 8.6 8.9  --  9.2 8.9  < > 8.8  < > 9.6   MAG 2.1 2.3  --   --  2.7*  --  2.9*  < > 3.0*   PHOS 3.0 3.0  --   --  2.4*  --  4.6*  < > 5.5*   PROTTOTAL  --   --   --   --   --   --   --   --  10.7*   ALBUMIN  --   --   --   --   --   --   --   --  3.2*   BILITOTAL  --   --   --   --   --   --   --   --  0.4   ALKPHOS  --   --   --   --   --   --   --   --  169*   AST  --   --   --   --   --   --   --   --  17   ALT  --   --   --   --   --   --    --   --  17   < > = values in this interval not displayed.  INRNo lab results found in last 7 days.    Results for orders placed or performed during the hospital encounter of 07/24/18   XR Abdomen Port 1 View    Narrative    XR ABDOMEN PORT 1 VW  7/24/2018 10:11 PM      HISTORY: ng tube placement;     COMPARISON: CT earlier same day    FINDINGS: Gastric tube is looped in the distal esophagus. Gaseous  distention of the stomach. No pleural effusion. No confluent pulmonary  opacities within the lung bases.      Impression    IMPRESSION: Gastric tube coiled within the distal esophagus which is  repositioned on the subsequent radiograph.    I have personally reviewed the examination and initial interpretation  and I agree with the findings.    KARMEN ODONNELL MD   XR Abdomen Port 1 View    Narrative    Single view of the abdomen  7/24/2018 11:34 PM      HISTORY: NG tube placement    COMPARISON: Radiograph earlier same day    FINDINGS: Gastric tube is repositioned, tip projecting over the  thoracic fundus. Gaseous distention of the stomach, decreased since  prior. No pleural effusion. No confluent pulmonary opacities within  the lung bases.      Impression    IMPRESSION: Gastric tube repositioning, in proper position over the  fundal/body region.    I have personally reviewed the examination and initial interpretation  and I agree with the findings.    KARMEN ODONNELL MD

## 2018-07-28 NOTE — PLAN OF CARE
"Problem: Patient Care Overview  Goal: Plan of Care/Patient Progress Review    11p-7a: VSS. Afebrile. Tolerating TPN and lipids. No nausea. Pain has been managed with the PCA pump. Up independently. Did ambulate in the halls x 2 because he \"couldn't sleep.\" NG to LIS, unless patient is up to the bathroom or ambulating in the halls.      Addendum: Has only small amount of liquid stool in his ileostomy at this time so pt opts not to empty it at this time. Suction cannister full. Pt states he has been drinking water tonight and that the majority of the output is from that. Pt used 3.5 mg total tonight from his PCA pump.   "

## 2018-07-28 NOTE — PLAN OF CARE
VSS, afebrile. Pain in lower abdomen, dilaudid PCA 0.5mg q 10min, max dose 3mg infusing, pain is tolerable with PCA. Fentanyl patch on left arm. Intermittent nausea, zofran given x1, relief provided. Up ad juan independently. Good ostomy output. Good urine output. Continue to monitor and w/ POC.

## 2018-07-28 NOTE — PLAN OF CARE
VSS, afebrile. Pain in lower abdomen, dilaudid PCA started 0.5 q 10 minutes, max dose of 3mg. Loading bolus dose 1.5mg given. Fentanyl patch on left arm increased to 75mcg. Intermittent nausea, zofran given x1, relief provided. Good output from ostomy. IVF infusing at 50mL/hr. Up ad juan independently. NG to LIS. NG output for 12 hour shift was 1500cc green output. Continue to monitor and w/ POC.

## 2018-07-28 NOTE — PLAN OF CARE
Problem: Patient Care Overview  Goal: Plan of Care/Patient Progress Review    7p-11p: VSS. Afebrile. No complaints. Pain has been managed with PCA pump. Having green liquid output from ileostomy. TPN and lipids hung. Patient up independently. Clamps ng tube when he is up ambulating in the halls, otherwise is on LIS.

## 2018-07-29 NOTE — PLAN OF CARE
VSS, afebrile. Pain well controlled with Dilaudid PCA 0.5mg q 10 minutes; max dose 3mg. Fentanyl patch 75mcg located on left arm. Denies nausea. A&Ox4. Up ad juna independently. Good urine output. Ostomy has good output. NG to LIS, NPO w/ ice chips and popsicles. Lovenox injection discontinued. Continue to monitor and w/ POC.

## 2018-07-29 NOTE — PLAN OF CARE
Problem: Patient Care Overview  Goal: Plan of Care/Patient Progress Review  Outcome: No Change    VSS, afebrile. Sepsis triggered, lactic was 0.9. Lipids and cyclic TPN started at 2000. Last hour reduce TPN rate to 110 (at 8am). Pt up ad juan and independent. NG to LIS. Adequate ostomy output. Pain in abdomen controlled well with dilaudid PCA 0.5 mg q 10 min, max dose 3 mg infusing. Fentanyl patch in place on L arm. Continue place of care.

## 2018-07-29 NOTE — PROGRESS NOTES
[facility], [place]    Hematology / Oncology Progress Note    Date of Admission: 7/24/2018  Hospital Day #: 5   Date of Service (when I saw the patient): 07/29/2018     Assessment & Plan   Sebas Lopez is a 53 year old male with PMHx significant for ulcerative colitis and sigmoid adenocarcinoma with peritoneal carcinomatosis s/p ileostomy 7/2017 and colonic stent 3/19 (with most recent revision 7/5) who presents from clinic with 3-4 day history of nausea/vomiting and increased abdominal pain, found to have a small bowel obstruction on CT scan.    Plan  -continue bowel rest and NG decompression  -per patient preference likely plan to trial clamping NG 7/30 AM per patient preference and if unsuccessful, consider venting G over next few days,   -will receive 7/29 enoxaparin dose today, then hold in anticipation of possible venting G  -monitor WBC    #Small bowel obstruction. Transition point in LLQ.  #Nausea and vomiting-improved.  #Increased abdominal pain.   Symptoms started about 3-4 days ago. Multiple emesis per day, notes abdominal pain would improve with emesis. Explains emesis appeared similar to ileostomy output in color/consistency. Ileostomy output seemed to remain the same about of output/mildly decrease. No blood in ileostomy output. Tried to keep up with PO fluid intake but could not. Found to have a SBO with dilated loops of small bowel and transition point in LLQ on CT. Additionally labs demonstrated an PAULINA and electrolyte abnormalities (See below).   -NG to LIS for decompression. NPO (but patient drinking lots of water).  -IVF replacement.  -IV medications as able. Home meds (bowel meds, vitamins, etc) held d/t strict NPO status and NG.   -IV antiemetics to treat sx.  -Medical mgmt at this time.  -Colorectal surgery consulted, ? Any surgical mgmt for SBO. They do not have any intervention to offer and recommend continuing with conservative/medical mgmt. May need to  consider venting G tube if does not resolve.  -Palliative care team consulted, patient follows with outpatient. Recommend increasing Fentanyl patch to 75 mg/72hrs and initiated on dilaudid PCA (1.5 mg loading dose f/b 0.5-1 mg boluses q10 minutes).  -7/27 having decreased ostomy output with around 3L of NG output. Ongoing high NG output but confounded by significant PO intake. 7/29 patient feels that obstruction may be improving with increased ostomy output but would like to defer trial of clamping NG until 7/30 AM.    #Hypokalemia/Hyponatremia/Hypochoremia. Resolved.  #Hypermagnesemia/Hyperphosphatemia. Resolved.  #PAULINA. Cr on admission 2.66. Improving, Cr 1.31 on 7/26. Resolved.  Likely 2/2 above nausea/vomiting/SBO leading to dehydration. Skin with some tenting on AM exam 7/25. On TPN support (see below).   -Replace lytes per protocol prn.   -IVF  ml/hr (+TPN volume), now decreased to 50 ml/hr +TPN volume.  -Daily BMP.    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    Follows with Dr. Long outpatient. S/p ex lap 7/2017 with small bowel resection and end ileostomy. S/p 6 cycles of FOLFOX. Admitted on 12/6/2017 for ex-lap with aborted HIPEC due to diffuse carcinomatosis. Repeat CT scan showed worsening peritoneal carcinomatosis. He was admitted to the hospital with worsening colon distention and colon stent was placed which improved his symptoms. He resumed FOLFOX C7 3/8/18 f/b hospitalization for bowel obstruction and stent replacement 3/19/2018. Continued with FOLFOX, last cycle #13 6/15/18. Repeat CT 6/27 with slightly progressed peritoneal carcinomatosis, increased colonic and rectal fluid favoring colitis. Per Dr. Long's note 6/28 next treatment options would likely be irinotecan and panitumumab. Has been unable to begin d/t recent hospitalizations.   -Will need follow up scheduled at discharge.     #Leukocytosis. No e/o infection at this time.  #Anemia.  Has had a mild leukocytosis  on follow up labs in clinic. Admission WBC count 16.7-->elevated to 18.7 7/25. Anemia 2/2 chronic illness.   -BCx drawn 7/25, NGTD.  -Afebrile, monitor at this time. Low threshold to start antibiotics if spikes.  -Will monitor with daily CBC.    # Pain Assessment:  Current Pain Score 7/29/2018   Patient currently in pain? yes   Pain score (0-10) -   Pain location Abdomen   Pain descriptors Aching   - Sebas is experiencing pain due to metastatic sigmoid adenocarcinoma. Pain management was discussed and the plan was created in a collaborative fashion.  Sebas's response to the current recommendations: engaged  - Opioid regimen: Fentanyl patch 37 mcg/72hrs, IV dilaudid 1-1.5 mg q2hrs prn for breakthrough. HELD PTA PO oxycodone d/t NPO status.  - Bowel regimen: defer d/t SBO and diarrhea through ileostomy. Tincture of opium and imodium held d/t NPO status.    FEN:  -IVF @ 50 ml/hr + TPN volume  -PRN lyte replacement  -NPO, NG in place, continue PTA TPN support, sips and limited popsicles for comfort    #Severe malnutrition in context of acute illness. >2% weight loss in 1 week, moderate subcutaneous fat loss, muscle loss in temporal/facial/jaw/ and thoracic region.   -Continue TPN support, optimize nutritional support.    Prophy/Misc:  -VTE: enoxaparin 40mg q24h 7/28 due to normalized renal function  -GI/PUD: IV Protonix    Code status: Full    Disposition: Pending improvement in acute issues, likely another 2-4 day stay.     Patient and plan discussed with staff, Dr. Hardin.    Morgan HallSan AngeloBilly Becerril MD, PhD   Hem/onc fellow    Interval History   Nursing notes reviewed, ongoing abdominal discomfort managed with current regimen including PCA.      Today, Sebas reports doing ok and has noted some intermittent abdominal pain which he feels is due to think starting to move again.  Pain continues to be adequately controlled with hydromorphone PCA.  He reports drinking a lot of water per day and ate half a dozen popsicles  yesterday.  He did ask if he could have a soda, but encouraged him to avoid carbonated beverages for now.    He continues to ambulate multiple times per day.    Denies HA, CP, SOB, dysuria, edema.    Physical Exam   Temp: 96.4  F (35.8  C) Temp src: Axillary BP: 118/81 Pulse: 99 Heart Rate: 91 Resp: 16 SpO2: 99 % O2 Device: None (Room air)    Vitals:    07/27/18 0700 07/28/18 0758 07/29/18 0700   Weight: 66.2 kg (145 lb 14.4 oz) 66.6 kg (146 lb 14.4 oz) 66.4 kg (146 lb 6.4 oz)     Vital Signs with Ranges  Temp:  [96.4  F (35.8  C)-98.7  F (37.1  C)] 96.4  F (35.8  C)  Pulse:  [] 99  Heart Rate:  [91] 91  Resp:  [12-20] 16  BP: (102-119)/(70-82) 118/81  SpO2:  [95 %-99 %] 99 %  I/O last 3 completed shifts:  In: 3549.2 [P.O.:200; I.V.:1200]  Out: 4800 [Urine:450; Emesis/NG output:3600; Stool:750]    Constitutional: Pleasant male seen sitting up in bed, in NAD. Alert and interactive.  HEENT: NCAT, anicteric sclerae, conjunctiva clear. NG in place.  Respiratory: Non-labored breathing, good air exchange. Lungs are clear to auscultation bilaterally, without wheezing, crackles or rhonchi. No cough noted.   Cardiovascular: Regular rate and rhythm with no murmur, rub or gallop.  GI: intermittent possible BS. Soft, nontender. No rebound. Watery green ostomy output in bag with likely gas, stoma appears normal/pink.   Skin: Warm and dry. No rashes or lesions on exposed surfaces.  Musculoskeletal: Extremities grossly normal. No tenderness or edema present.   Neurologic: Alert, oriented to situation, speech normal, answering questions appropriately. Moves all extremities spontaneously. Grossly non-focal.  Neuropsychiatric: Mentation and affect normal/appropriate.  VAD: PICC is c/d/i with no erythema, drainage, or tenderness.    Medications   Current Facility-Administered Medications   Medication     benzocaine-menthol (CEPACOL) 15-3.6 MG lozenge 1 lozenge     dextrose 10 % 1,000 mL infusion     diphenhydrAMINE (BENADRYL)  injection 50 mg     enoxaparin (LOVENOX) injection 40 mg     fentaNYL (DURAGESIC) 75 mcg/hr 72 hr patch 1 patch     fentaNYL (DURAGESIC) Patch in Place     fentaNYL (DURAGESIC) patch REMOVAL     HYDROmorphone (DILAUDID) PCA 1 mg/mL OPIOID TOLERANT     lipids (INTRALIPID) 20 % infusion 250 mL     Medication Instruction     naloxone (NARCAN) injection 0.4 mg     ondansetron (ZOFRAN) injection 8 mg     pantoprazole (PROTONIX) 40 mg IV push injection     parenteral nutrition - ADULT compounded formula CYCLE     parenteral nutrition - ADULT compounded formula CYCLE     potassium chloride (KLOR-CON) Packet 20-40 mEq     potassium chloride 10 mEq in 100 mL intermittent infusion with 10 mg lidocaine     potassium chloride 10 mEq in 100 mL sterile water intermittent infusion (premix)     potassium chloride 20 mEq in 50 mL intermittent infusion     potassium chloride SA (K-DUR/KLOR-CON M) CR tablet 20-40 mEq     prochlorperazine (COMPAZINE) tablet 10 mg    Or     prochlorperazine (COMPAZINE) injection 10 mg     sodium chloride (PF) 0.9% PF flush 3 mL     sodium chloride 0.9% infusion       Data   CBC    Recent Labs  Lab 07/29/18  0544 07/28/18  0602 07/27/18  0538 07/26/18  0548   WBC 19.1* 21.6* 18.6* 15.6*   RBC 3.20* 3.14* 3.37* 3.29*   HGB 8.8* 8.7* 9.4* 9.1*   HCT 28.3* 27.6* 29.3* 27.7*   MCV 88 88 87 84   MCH 27.5 27.7 27.9 27.7   MCHC 31.1* 31.5 32.1 32.9   RDW 17.9* 17.9* 17.7* 17.3*   * 564* 626* 613*     CMP  Recent Labs  Lab 07/29/18  0544 07/28/18  0602 07/27/18  0538 07/27/18  0151 07/26/18  1742 07/26/18  0548  07/24/18  1333    139 139  --  136 132*  < > 123*   POTASSIUM 4.8 4.2 4.2 4.3 3.3* 3.9  < > 3.6   CHLORIDE 105 102 100  --  95 91*  < > 80*   CO2 27 30 31  --  34* 36*  < > 30   ANIONGAP 9 6 7  --  7 6  < > 13   * 127* 113*  --  83 144  < > 138*   BUN 37* 40* 47*  --  54* 67*  < > 97*   CR 0.90 0.97 1.01  --  1.18 1.31*  < > 2.03*   GFRESTIMATED 88 81 77  --  64 57*  < > 34*    GFRESTBLACK >90 >90 >90  --  78 69  < > 41*   ANNAMARIE 8.7 8.6 8.9  --  9.2 8.9  < > 9.6   MAG 2.1 2.1 2.3  --   --  2.7*  < > 3.0*   PHOS 4.1 3.0 3.0  --   --  2.4*  < > 5.5*   PROTTOTAL  --   --   --   --   --   --   --  10.7*   ALBUMIN  --   --   --   --   --   --   --  3.2*   BILITOTAL  --   --   --   --   --   --   --  0.4   ALKPHOS  --   --   --   --   --   --   --  169*   AST  --   --   --   --   --   --   --  17   ALT  --   --   --   --   --   --   --  17   < > = values in this interval not displayed.  INRNo lab results found in last 7 days.    Results for orders placed or performed during the hospital encounter of 07/24/18   XR Abdomen Port 1 View    Narrative    XR ABDOMEN PORT 1 VW  7/24/2018 10:11 PM      HISTORY: ng tube placement;     COMPARISON: CT earlier same day    FINDINGS: Gastric tube is looped in the distal esophagus. Gaseous  distention of the stomach. No pleural effusion. No confluent pulmonary  opacities within the lung bases.      Impression    IMPRESSION: Gastric tube coiled within the distal esophagus which is  repositioned on the subsequent radiograph.    I have personally reviewed the examination and initial interpretation  and I agree with the findings.    KARMEN ODONNELL MD   XR Abdomen Port 1 View    Narrative    Single view of the abdomen  7/24/2018 11:34 PM      HISTORY: NG tube placement    COMPARISON: Radiograph earlier same day    FINDINGS: Gastric tube is repositioned, tip projecting over the  thoracic fundus. Gaseous distention of the stomach, decreased since  prior. No pleural effusion. No confluent pulmonary opacities within  the lung bases.      Impression    IMPRESSION: Gastric tube repositioning, in proper position over the  fundal/body region.    I have personally reviewed the examination and initial interpretation  and I agree with the findings.    KARMEN ODONNELL MD

## 2018-07-29 NOTE — PLAN OF CARE
Problem: Pain, Chronic (Adult)  Goal: Acceptable Pain Control/Comfort Level  Patient will demonstrate the desired outcomes by discharge/transition of care.   Outcome: No Change    VSS, afebrile. Continues w/ lower abdominal pain, controlled by dilaudid PCA 0.5mg bumps Q10min, pt used 4.5mg this shift. Fentanyl patch also on L arm. C/o nausea this AM, IV zofran given x1. Taking ice chips, cycled TPN and lipids infusing. NG to LIS. 250mL output from ileostomy this shift. Voiding spontaneously, up ad juan. Cares clustered. Continue to monitor and w/ POC.

## 2018-07-30 NOTE — PLAN OF CARE
Problem: Pain, Chronic (Adult)  Goal: Identify Related Risk Factors and Signs and Symptoms  Related risk factors and signs and symptoms are identified upon initiation of Human [employer] Clinical Practice Guideline (CPG).   Outcome: No Change    VSS, afebrile. Set off sepsis protocol for mild tachycardia, lactic acid 0.6. Continues w/ abdominal pain, relieved by dilaudid PCA 0.5mg bumps Q10min. Pt reports he is trying to use PCA less because he does not want to become constipated, happy that he had more output from ileostomy yesterday. Voiding spontaneously. Cycled TPN and lipids infusing. Zofran x1 this AM for nausea. Up ad juan, ambulating frequently. NG to LIS. Continue to monitor and w/ POC.

## 2018-07-30 NOTE — PROGRESS NOTES
This is a recent snapshot of the patient's [facility] medical record.  For current drug dose and complete information and questions, call [phone] or In Basket pool, [facility]v home infusion ([id number])  CSN Number:  [id number]

## 2018-07-30 NOTE — PROGRESS NOTES
This is a recent snapshot of the patient's [facility] medical record.  For current drug dose and complete information and questions, call [phone] or In Basket pool, [facility]v home infusion ([id number])  CSN Number:  [id number]

## 2018-07-30 NOTE — PROGRESS NOTES
[facility], [place]    Hematology / Oncology Progress Note    Date of Admission: 7/24/2018  Hospital Day #: 6   Date of Service (when I saw the patient): 07/30/2018     Assessment & Plan   Sebas Lopez is a 53 year old male with PMHx significant for ulcerative colitis and sigmoid adenocarcinoma with peritoneal carcinomatosis s/p ileostomy 7/2017 and colonic stent 3/19 (with most recent revision 7/5) who presents from clinic with 3-4 day history of nausea/vomiting and increased abdominal pain, found to have a small bowel obstruction on CT scan.    Plan  -attempted trial clamping NG 7/30 - had nausea and needed to be hooked up to LIS after 4.5 hours  - discussed with colorectal surgery -recommend reviewing with GI team and IR to see if they can consider venting G tube placement - consulted them in afternoon to discuss  - continue pain regimen per Palliative Care    #Small bowel obstruction. Transition point in LLQ.  #Nausea and vomiting-improved.  #Increased abdominal pain.   Symptoms started about 3-4 days ago. Multiple emesis per day, notes abdominal pain would improve with emesis. Explains emesis appeared similar to ileostomy output in color/consistency. Ileostomy output seemed to remain the same about of output/mildly decrease. No blood in ileostomy output. Tried to keep up with PO fluid intake but could not. Found to have a SBO with dilated loops of small bowel and transition point in LLQ on CT. Additionally labs demonstrated an PAULINA and electrolyte abnormalities (See below).   -NG to LIS for decompression. NPO (but patient drinking lots of water).  -IVF replacement.  -IV medications as able. Home meds (bowel meds, vitamins, etc) held d/t strict NPO status and NG.   -IV antiemetics to treat sx.  -Medical mgmt at this time.  -Colorectal surgery consulted, ? Any surgical mgmt for SBO. They do not have any intervention to offer and recommend continuing with conservative/medical  mgmt. May need to consider venting G tube if does not resolve.  -Palliative care team consulted, patient follows with outpatient. Recommend increasing Fentanyl patch to 75 mg/72hrs and initiated on dilaudid PCA (1.5 mg loading dose f/b 0.5-1 mg boluses q10 minutes).  -7/27 having decreased ostomy output with around 3L of NG output. Ongoing high NG output but confounded by significant PO intake. 7/29 patient feels that obstruction may be improving with increased ostomy output but would like to defer trial of clamping NG until 7/30 AM.    #Hypokalemia/Hyponatremia/Hypochoremia. Resolved.  #Hypermagnesemia/Hyperphosphatemia. Resolved.  #PAULINA. Cr on admission 2.66. Improving, Cr 1.31 on 7/26. Resolved.  Likely 2/2 above nausea/vomiting/SBO leading to dehydration. Skin with some tenting on AM exam 7/25. On TPN support (see below).   -Replace lytes per protocol prn.   -IVF  ml/hr (+TPN volume), now decreased to 50 ml/hr +TPN volume.  -Daily BMP.    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    Follows with Dr. Long outpatient. S/p ex lap 7/2017 with small bowel resection and end ileostomy. S/p 6 cycles of FOLFOX. Admitted on 12/6/2017 for ex-lap with aborted HIPEC due to diffuse carcinomatosis. Repeat CT scan showed worsening peritoneal carcinomatosis. He was admitted to the hospital with worsening colon distention and colon stent was placed which improved his symptoms. He resumed FOLFOX C7 3/8/18 f/b hospitalization for bowel obstruction and stent replacement 3/19/2018. Continued with FOLFOX, last cycle #13 6/15/18. Repeat CT 6/27 with slightly progressed peritoneal carcinomatosis, increased colonic and rectal fluid favoring colitis. Per Dr. Long's note 6/28 next treatment options would likely be irinotecan and panitumumab. Has been unable to begin d/t recent hospitalizations.   -Will need follow up scheduled at discharge.     #Leukocytosis. No e/o infection at this time.  #Anemia.  Has had a  mild leukocytosis on follow up labs in clinic. Admission WBC count 16.7-->elevated to 18.7 7/25. Anemia 2/2 chronic illness.   -BCx drawn 7/25, NGTD.  -Afebrile, monitor at this time. Low threshold to start antibiotics if spikes.  -Will monitor with daily CBC.    # Pain Assessment:  Current Pain Score 7/30/2018   Patient currently in pain? yes   Pain score (0-10) -   Pain location Abdomen   Pain descriptors Aching   - Sebas is experiencing pain due to metastatic sigmoid adenocarcinoma. Pain management was discussed and the plan was created in a collaborative fashion.  Sebas's response to the current recommendations: engaged  - Opioid regimen: Fentanyl patch 37 mcg/72hrs, IV dilaudid 1-1.5 mg q2hrs prn for breakthrough. HELD PTA PO oxycodone d/t NPO status.  - Bowel regimen: defer d/t SBO and diarrhea through ileostomy. Tincture of opium and imodium held d/t NPO status.    FEN:  -IVF @ 50 ml/hr + TPN volume  -PRN lyte replacement  -NPO, NG in place, continue PTA TPN support, sips and limited popsicles for comfort    #Severe malnutrition in context of acute illness. >2% weight loss in 1 week, moderate subcutaneous fat loss, muscle loss in temporal/facial/jaw/ and thoracic region.   -Continue TPN support, optimize nutritional support.    Prophy/Misc:  -VTE: enoxaparin 40mg q24h 7/28 due to normalized renal function  -GI/PUD: IV Protonix    Code status: Full    Disposition: Pending improvement in acute issues, likely another 2-4 day stay.     Patient and plan discussed with staff, Dr. Hardin.    Laure Gr, EMERSON-BC  Hematology/Oncology  #[id number]      Interval History   Nursing notes reviewed, ongoing abdominal discomfort managed with current regimen including PCA.      Today, Sebas reports doing ok; he did have 2 productive BMs from ostomy, which felt good. He also continues to have intermittent abdominal pain, which he thinks is because he got behind on his pain medications as he was trying to decrease his pain  medication use to so he could have a BM.  He did have nausea about 30 minutes after clamping NG tube.   He continues to ambulate multiple times per day.    Denies HA, CP, SOB, dysuria, edema, and voiding without difficulty.     Physical Exam   Temp: 97.4  F (36.3  C) Temp src: Oral BP: 108/75   Heart Rate: 90 Resp: 18 SpO2: 97 % O2 Device: None (Room air)    Vitals:    07/28/18 0758 07/29/18 0700 07/30/18 0750   Weight: 66.6 kg (146 lb 14.4 oz) 66.4 kg (146 lb 6.4 oz) 67.2 kg (148 lb 1.6 oz)     Vital Signs with Ranges  Temp:  [95.9  F (35.5  C)-97.8  F (36.6  C)] 97.4  F (36.3  C)  Heart Rate:  [] 90  Resp:  [16-18] 18  BP: (108-130)/(75-85) 108/75  SpO2:  [97 %-98 %] 97 %  I/O last 3 completed shifts:  In: 2910.4 [P.O.:200; I.V.:800]  Out: 4475 [Urine:300; Emesis/NG output:2450; Stool:1725]    Constitutional: Pleasant male seen sitting up in bed, in NAD. Alert and interactive.  HEENT: NCAT, anicteric sclerae, conjunctiva clear. NG in place.  Respiratory: Non-labored breathing, good air exchange. Lungs are clear to auscultation bilaterally, without wheezing, crackles or rhonchi. No cough noted.   Cardiovascular: Regular rate and rhythm with no murmur, rub or gallop.  GI: intermittent possible BS. Soft, nontender. No rebound. Watery green ostomy output in bag with likely gas, stoma appears normal/pink.   Skin: Warm and dry. No rashes or lesions on exposed surfaces.  Musculoskeletal: Extremities grossly normal. No tenderness or edema present.   Neurologic: Alert, oriented to situation, speech normal, answering questions appropriately. Moves all extremities spontaneously. Grossly non-focal.  Neuropsychiatric: Mentation and affect normal/appropriate.  VAD: PICC is c/d/i with no erythema, drainage, or tenderness.    Medications   Current Facility-Administered Medications   Medication     benzocaine-menthol (CEPACOL) 15-3.6 MG lozenge 1 lozenge     dextrose 10 % 1,000 mL infusion     diphenhydrAMINE (BENADRYL)  injection 50 mg     fentaNYL (DURAGESIC) 75 mcg/hr 72 hr patch 1 patch     fentaNYL (DURAGESIC) Patch in Place     fentaNYL (DURAGESIC) patch REMOVAL     HYDROmorphone (DILAUDID) PCA 1 mg/mL OPIOID TOLERANT     lipids (INTRALIPID) 20 % infusion 250 mL     Medication Instruction     naloxone (NARCAN) injection 0.4 mg     ondansetron (ZOFRAN) injection 8 mg     pantoprazole (PROTONIX) 40 mg IV push injection     parenteral nutrition - ADULT compounded formula CYCLE     parenteral nutrition - ADULT compounded formula CYCLE     potassium chloride (KLOR-CON) Packet 20-40 mEq     potassium chloride 10 mEq in 100 mL intermittent infusion with 10 mg lidocaine     potassium chloride 10 mEq in 100 mL sterile water intermittent infusion (premix)     potassium chloride 20 mEq in 50 mL intermittent infusion     potassium chloride SA (K-DUR/KLOR-CON M) CR tablet 20-40 mEq     prochlorperazine (COMPAZINE) tablet 10 mg    Or     prochlorperazine (COMPAZINE) injection 10 mg     sodium chloride (PF) 0.9% PF flush 3 mL     sodium chloride 0.9% infusion       Data   CBC    Recent Labs  Lab 07/30/18  0548 07/30/18  0004 07/29/18  0544 07/28/18  0602 07/27/18  0538   WBC 20.5*  --  19.1* 21.6* 18.6*   RBC 3.25*  --  3.20* 3.14* 3.37*   HGB 8.8*  --  8.8* 8.7* 9.4*   HCT 28.5*  --  28.3* 27.6* 29.3*   MCV 88  --  88 88 87   MCH 27.1  --  27.5 27.7 27.9   MCHC 30.9*  --  31.1* 31.5 32.1   RDW 18.0*  --  17.9* 17.9* 17.7*   * 507* 512* 564* 626*     CMP  Recent Labs  Lab 07/30/18  0548 07/29/18  0544 07/28/18  0602 07/27/18  0538  07/24/18  1333    140 139 139  < > 123*   POTASSIUM 4.4 4.8 4.2 4.2  < > 3.6   CHLORIDE 104 105 102 100  < > 80*   CO2 26 27 30 31  < > 30   ANIONGAP 8 9 6 7  < > 13   * 202* 127* 113*  < > 138*   BUN 36* 37* 40* 47*  < > 97*   CR 0.86 0.90 0.97 1.01  < > 2.03*   GFRESTIMATED >90 88 81 77  < > 34*   GFRESTBLACK >90 >90 >90 >90  < > 41*   ANNAMARIE 8.7 8.7 8.6 8.9  < > 9.6   MAG 1.9 2.1 2.1 2.3  < >  3.0*   PHOS 3.6 4.1 3.0 3.0  < > 5.5*   PROTTOTAL 7.3  --   --   --   --  10.7*   ALBUMIN 2.2*  --   --   --   --  3.2*   BILITOTAL 0.2  --   --   --   --  0.4   ALKPHOS 131  --   --   --   --  169*   AST 20  --   --   --   --  17   ALT 15  --   --   --   --  17   < > = values in this interval not displayed.  INR    Recent Labs  Lab 07/30/18  0548   INR 1.04       Results for orders placed or performed during the hospital encounter of 07/24/18   XR Abdomen Port 1 View    Narrative    XR ABDOMEN PORT 1 VW  7/24/2018 10:11 PM      HISTORY: ng tube placement;     COMPARISON: CT earlier same day    FINDINGS: Gastric tube is looped in the distal esophagus. Gaseous  distention of the stomach. No pleural effusion. No confluent pulmonary  opacities within the lung bases.      Impression    IMPRESSION: Gastric tube coiled within the distal esophagus which is  repositioned on the subsequent radiograph.    I have personally reviewed the examination and initial interpretation  and I agree with the findings.    KARMEN ODONNELL MD   XR Abdomen Port 1 View    Narrative    Single view of the abdomen  7/24/2018 11:34 PM      HISTORY: NG tube placement    COMPARISON: Radiograph earlier same day    FINDINGS: Gastric tube is repositioned, tip projecting over the  thoracic fundus. Gaseous distention of the stomach, decreased since  prior. No pleural effusion. No confluent pulmonary opacities within  the lung bases.      Impression    IMPRESSION: Gastric tube repositioning, in proper position over the  fundal/body region.    I have personally reviewed the examination and initial interpretation  and I agree with the findings.    KARMEN ODONNELL MD

## 2018-07-30 NOTE — PROGRESS NOTES
[facility], [place]  Palliative Care Progress Note    Patient: Sebas Lopez  Date of Admission:  7/24/2018    Recommendations:  -Continue 75 mcg Fentanyl patch  -Continue Hydromorphone PCA  -Will plan to transition back to Oxycodone when patient able to take PO meds       Assessment  Sebas Lopez is a 53 year old male with history of ulcerative colitis now with peritoneal carcinomatosis from sigmoid adenocarcinoma, s/p ileostomy in 7/17 and colonic stent placement 3/19. He presented with increased abdominal pain, nausea, and vomiting secondary to SBO on 7/24; may consider a venting G tube if obstruction does not resolve with medical management.     Palliative care was consulted for pain management, and I followed up with Brain today regarding medication changes made on Friday.     Symptoms:   Pain--abdominal, 2/2 SBO. PTA was on Fentanyl 37 mcg/hr patch and Oxycodone 20-30 mg q4h prn. Started a Hydromorphone PCA 0.5 mg q10min on 7/27. Sebas reports the PCA gives him good relief, when he pushes the button; he tried to limit opioids to help with his bowel function and pain became unmanageable.     Pain management was discussed with Sebas and heme/onc. The plan was created in a collaborative fashion, and the patient's response to the current recommendations are engaged and agreeable.       These recommendations have been discussed with Laure Gr.    ALEXA Corbin CNP  Palliative Care Consult Team  Pager: [phone]    [facility] Inpatient Team Consult pager [phone] (M-F 8-4:30)  After-hours Answering Service [phone]   Palliative Clinic: [phone]     35 minutes spent with patient, with >50% counseling and in care coordination.     Interval History:   Ng tube clamped for a couple hours today, but ultimately hooked back up to wall suction due to increased abdominal pain. Patient reported having increasing ostomy output today.          Medications:   I have  reviewed this patient's medication profile and medications during this hospitalization.    TPN/Lipids    Fentanyl 75 mcg/hr patch   Hydromorphone PCA 0.5 mg [id number]--pvar 15.8 mg yesterday        Review of Systems:   A comprehensive ROS has been negative other than stated in the HPI and below:   Palliative Symptom Review (0=no symptom/no concern, 1=mild, 2=moderate, 3=severe):  Pain: 2  Fatigue: 1  Nausea: 0  Constipation: 1  Diarrhea: 0  Depressive Symptoms: 0  Anxiety: 0  Drowsiness: 0  Poor Appetite: NPO  Shortness of Breath: 0        Physical Exam:   Vital Signs: Temp: 97.4  F (36.3  C) Temp src: Oral BP: 108/75   Heart Rate: 90 Resp: 18 SpO2: 97 % O2 Device: None (Room air)    Weight: 148 lbs 1.6 oz    Constitutional: Pleasant male, seen lying in hospital bed, in no acute distress.   Head: Normocephalic. Atraumatic. MMM.   Extremities: Warm and well perfused. No edema.  Pulm: Non-labored breathing, on room air. Speaking in complete sentences.    GI: Stoma pink, with brown liquid output. Ng tube in place.   Musculoskeletal: CONNOLLY. No obvious malformations.   Skin: No jaundice. No concerning rashes or lesions on exposed areas.   Neuro: A/O x 4. Face symmetrical. PERRL. EOMI.   Psych: Speech clear. Affect appropriate. Memory and insight intact.      Data Reviewed:     ROUTINE ICU LABS (Last four results)  CMP  Recent Labs  Lab 07/30/18  0548 07/29/18  0544 07/28/18  0602 07/27/18  0538  07/24/18  1333    140 139 139  < > 123*   POTASSIUM 4.4 4.8 4.2 4.2  < > 3.6   CHLORIDE 104 105 102 100  < > 80*   CO2 26 27 30 31  < > 30   ANIONGAP 8 9 6 7  < > 13   * 202* 127* 113*  < > 138*   BUN 36* 37* 40* 47*  < > 97*   CR 0.86 0.90 0.97 1.01  < > 2.03*   GFRESTIMATED >90 88 81 77  < > 34*   GFRESTBLACK >90 >90 >90 >90  < > 41*   ANNAMARIE 8.7 8.7 8.6 8.9  < > 9.6   MAG 1.9 2.1 2.1 2.3  < > 3.0*   PHOS 3.6 4.1 3.0 3.0  < > 5.5*   PROTTOTAL 7.3  --   --   --   --  10.7*   ALBUMIN 2.2*  --   --   --   --  3.2*   BILITOTAL  0.2  --   --   --   --  0.4   ALKPHOS 131  --   --   --   --  169*   AST 20  --   --   --   --  17   ALT 15  --   --   --   --  17   < > = values in this interval not displayed.  CBC  Recent Labs  Lab 07/30/18  0548 07/30/18  0004 07/29/18  0544 07/28/18  0602 07/27/18  0538   WBC 20.5*  --  19.1* 21.6* 18.6*   RBC 3.25*  --  3.20* 3.14* 3.37*   HGB 8.8*  --  8.8* 8.7* 9.4*   HCT 28.5*  --  28.3* 27.6* 29.3*   MCV 88  --  88 88 87   MCH 27.1  --  27.5 27.7 27.9   MCHC 30.9*  --  31.1* 31.5 32.1   RDW 18.0*  --  17.9* 17.9* 17.7*   * 507* 512* 564* 626*     INR  Recent Labs  Lab 07/30/18  0548   INR 1.04

## 2018-07-30 NOTE — PLAN OF CARE
VSS, afebrile. Pain in lower abdomen is manageable with Dilaudid PCA 0.5mg q 10 minutes, max dose 3mg. Denies nausea. NG clamped at 1000 and unclamped at 1440. Pt was able to tolerate being clamped for 4 1/2 hours. NG is now hooked up to LIS. Pt stated he feels uncomfortable but the pain medication is helps to make it tolerable. IVF infusing at 50mL/hr. Good output from Ostomy. Voiding well. New Fentanyl patch placed on pt left arm. Continue to monitor and w/ POC.

## 2018-07-30 NOTE — PLAN OF CARE
Problem: Pain, Chronic (Adult)  Goal: Acceptable Pain Control/Comfort Level  Patient will demonstrate the desired outcomes by discharge/transition of care.   Outcome: No Change    , AOVSS on RA, afebrile. Sepsis triggered at night time vitals (2300). Pain well controlled with Dilaudid PCA 0.5mg q10 minutes; max dose 3 mg. Pt states he has tried not to push the button as much tonight. Fentanyl patch in place on L arm. Up ad juan; ambulated halls. Lipids and TPN cyclic infusing. UO adequate. Ileostomy good output. NG to LIS. NPO with ice chips and popsicles. Palliative consult tomorrow with possible change to pain regimen. Continue plan of care.

## 2018-07-30 NOTE — CONSULTS
Patient is on IR schedule 8/1/2018 for a venting Gastrostomy tube placement    Labs WNL for procedure.    Orders for NPO, scrubs and antibiotics have been entered.  Consent will be done prior to procedure.     Please contact the IR charge RN at [phone] for estimated time of procedure.     Sugey Raymundo IR RPA  [phone] Call pager  [phone] pager

## 2018-07-31 NOTE — PLAN OF CARE
Problem: Pain, Chronic (Adult)  Goal: Identify Related Risk Factors and Signs and Symptoms  Related risk factors and signs and symptoms are identified upon initiation of Human Response Clinical Practice Guideline (CPG).   Outcome: No Change  Denies nausea,  NG to LIS. Using dilaudid PCA at 0.5 Q 10 minutes for pain control. TPN infusing at 218 ml/hr. NS at 50 ml/hr. Up independently in room. Flagged sepsis and protocol was followed. Lactic acid was 1.0.

## 2018-07-31 NOTE — PLAN OF CARE
Problem: Patient Care Overview  Goal: Plan of Care/Patient Progress Review  Outcome: No Change  AVSS. Afebrile. A&O x4. Pain well controlled with PCA Dilaudid. Denies any nausea. PICC line occluded, team notified. TPA given but line remains occluded, second dose of TPA ordered. Good output from NG on low intermittent suction, good output from ileostomy. Up ad juan independently in hallways. Pt scheduled for G-tube placement tomorrow morning, NPO at midnight. Continue to monitor and w/ POC

## 2018-07-31 NOTE — PROGRESS NOTES
[facility], [place]    Hematology / Oncology Progress Note    Date of Admission: 7/24/2018  Hospital Day #: 7   Date of Service (when I saw the patient): 07/31/2018     Assessment & Plan   Sebas Lopez is a 53 year old male with PMHx significant for ulcerative colitis and sigmoid adenocarcinoma with peritoneal carcinomatosis s/p ileostomy 7/2017 and colonic stent 3/19 (with most recent revision 7/5) who presents from clinic with 3-4 day history of nausea/vomiting and increased abdominal pain, found to have a small bowel obstruction on CT scan.    Plan  - IR to place venting-G tube 8/1. Patient aware and agrees to plan  - continue pain regimen per Palliative Care - meed to discuss plan to transition from PCA to PRNs which may be difficult given compromised absorption with SBO    #Small bowel obstruction. Transition point in LLQ.  #Nausea and vomiting-improved.  #Increased abdominal pain.   Symptoms started about 3-4 days ago. Multiple emesis per day, notes abdominal pain would improve with emesis. Explains emesis appeared similar to ileostomy output in color/consistency. Ileostomy output seemed to remain the same about of output/mildly decrease. No blood in ileostomy output. Tried to keep up with PO fluid intake but could not. Found to have a SBO with dilated loops of small bowel and transition point in LLQ on CT. Additionally labs demonstrated an PAULINA and electrolyte abnormalities (See below).   -NG to LIS for decompression. NPO (but patient drinking lots of water).  -IVF replacement.  -IV medications as able. Home meds (bowel meds, vitamins, etc) held d/t strict NPO status and NG.   -IV antiemetics to treat sx.  -Medical mgmt at this time.  -Colorectal surgery consulted, ? Any surgical mgmt for SBO. They do not have any intervention to offer and recommend continuing with conservative/medical mgmt.  - Plan for venting G-tube 8/1 with IR.    -Palliative care team consulted, patient  follows with outpatient. Continue Fentanyl patch to 75 mg/72hrs and initiated on dilaudid PCA (1.5 mg loading dose f/b 0.5-1 mg boluses q10 minutes). Need to determine plan for breakthrough pain after G-tube is placed.     #Hypokalemia/Hyponatremia/Hypochoremia. Resolved.  #Hypermagnesemia/Hyperphosphatemia. Resolved.  #PAULINA. Cr on admission 2.66. Improving, Cr 1.31 on 7/26. Resolved.  Likely 2/2 above nausea/vomiting/SBO leading to dehydration. Skin with some tenting on AM exam 7/25. On TPN support (see below).   -Replace lytes per protocol prn.   -IVF  ml/hr (+TPN volume), now decreased to 50 ml/hr +TPN volume.  -Daily BMP.    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    Follows with Dr. Long outpatient. S/p ex lap 7/2017 with small bowel resection and end ileostomy. S/p 6 cycles of FOLFOX. Admitted on 12/6/2017 for ex-lap with aborted HIPEC due to diffuse carcinomatosis. Repeat CT scan showed worsening peritoneal carcinomatosis. He was admitted to the hospital with worsening colon distention and colon stent was placed which improved his symptoms. He resumed FOLFOX C7 3/8/18 f/b hospitalization for bowel obstruction and stent replacement 3/19/2018. Continued with FOLFOX, last cycle #13 6/15/18. Repeat CT 6/27 with slightly progressed peritoneal carcinomatosis, increased colonic and rectal fluid favoring colitis. Per Dr. Long's note 6/28 next treatment options would likely be irinotecan and panitumumab. Has been unable to begin d/t recent hospitalizations.   -Will need follow up scheduled at discharge.     #Leukocytosis. No e/o infection at this time.  #Anemia.  Has had a mild leukocytosis on follow up labs in clinic. Admission WBC count 16.7-->elevated to 18.7 7/25. Anemia 2/2 chronic illness.   -BCx drawn 7/25, NGTD.  -Afebrile, monitor at this time. Low threshold to start antibiotics if spikes.  -Will monitor with daily CBC.    # Pain Assessment:  Current Pain Score 7/31/2018    Patient currently in pain? yes   Pain score (0-10) -   Pain location Abdomen   Pain descriptors -   - Sebas is experiencing pain due to metastatic sigmoid adenocarcinoma. Pain management was discussed and the plan was created in a collaborative fashion.  Sebas's response to the current recommendations: engaged  - Opioid regimen: Fentanyl patch 37 mcg/72hrs, IV dilaudid 1-1.5 mg q2hrs prn for breakthrough. HELD PTA PO oxycodone d/t NPO status.  - Bowel regimen: defer d/t SBO and diarrhea through ileostomy. Tincture of opium and imodium held d/t NPO status.    FEN:  -IVF @ 50 ml/hr + TPN volume  -PRN lyte replacement  -NPO, NG in place, continue PTA TPN support, sips and limited popsicles for comfort    #Severe malnutrition in context of acute illness. >2% weight loss in 1 week, moderate subcutaneous fat loss, muscle loss in temporal/facial/jaw/ and thoracic region.   -Continue TPN support, optimize nutritional support.    Prophy/Misc:  -VTE: enoxaparin 40mg q24h 7/28 due to normalized renal function  -GI/PUD: IV Protonix    Code status: Full    Disposition: Pending improvement in acute issues, likely another 2-4 day stay.     Patient and plan discussed with staff, Dr. Hardin.    Lauer Gr, EMERSON-BC  Hematology/Oncology  #[id number]      Interval History   Nursing notes reviewed, ongoing abdominal discomfort managed with current regimen including PCA.      Today, Sebas reports doing ok; he has continued to have good output from his ostomy.  He continues to have intermittent abdominal pain, and the PCA does help with this.  No furhter nausea since hooking back up to the LIS. We discussed the G-tube placement, he understands he needs it based on his inability to tolerate his NG being clamped. He continues to ambulate multiple times per day.    Denies HA, CP, SOB, dysuria, edema, and voiding without difficulty.     Physical Exam   Temp: 96.6  F (35.9  C) Temp src: Oral BP: 120/82   Heart Rate: 101 Resp: 16 SpO2: 98 % O2  Device: None (Room air)    Vitals:    07/29/18 0700 07/30/18 0750 07/31/18 0948   Weight: 66.4 kg (146 lb 6.4 oz) 67.2 kg (148 lb 1.6 oz) 67.7 kg (149 lb 4.8 oz)     Vital Signs with Ranges  Temp:  [95.5  F (35.3  C)-97.4  F (36.3  C)] 96.6  F (35.9  C)  Heart Rate:  [] 101  Resp:  [16-18] 16  BP: (108-120)/(70-86) 120/82  SpO2:  [97 %-99 %] 98 %  I/O last 3 completed shifts:  In: 2614 [I.V.:870]  Out: 3025 [Urine:450; Emesis/NG output:1900; Stool:675]    Constitutional: Pleasant male seen sitting up in bed, in NAD. Alert and interactive.  HEENT: NCAT, anicteric sclerae, conjunctiva clear. NG in place.  Respiratory: Non-labored breathing, good air exchange. Lungs are clear to auscultation bilaterally, without wheezing, crackles or rhonchi. No cough noted.   Cardiovascular: Regular rate and rhythm with no murmur, rub or gallop.  GI: intermittent BS. Soft, tender with palpation in lower abdomen. No rebound. Watery green/yellow ostomy output in bag with likely gas, stoma appears normal/pink.   Skin: Warm and dry. No rashes or lesions on exposed surfaces.  Musculoskeletal: Extremities grossly normal. No tenderness or edema present.   Neurologic: Alert, oriented to situation, speech normal, answering questions appropriately. Moves all extremities spontaneously. Grossly non-focal.  Neuropsychiatric: Mentation and affect normal/appropriate.  VAD: PICC is c/d/i with no erythema, drainage, or tenderness.    Medications   Current Facility-Administered Medications   Medication     alteplase (CATHFLO ACTIVASE) injection 2 mg     benzocaine-menthol (CEPACOL) 15-3.6 MG lozenge 1 lozenge     ceFAZolin (ANCEF) intermittent infusion 2 g in 100 mL dextrose PRE-MIX     dextrose 10 % 1,000 mL infusion     diphenhydrAMINE (BENADRYL) injection 50 mg     fentaNYL (DURAGESIC) 75 mcg/hr 72 hr patch 1 patch     fentaNYL (DURAGESIC) Patch in Place     fentaNYL (DURAGESIC) patch REMOVAL     HYDROmorphone (DILAUDID) PCA 1 mg/mL OPIOID  TOLERANT     lipids (INTRALIPID) 20 % infusion 250 mL     May take regular AM medications except those listed below     Medication Instruction     naloxone (NARCAN) injection 0.4 mg     ondansetron (ZOFRAN) injection 8 mg     pantoprazole (PROTONIX) 40 mg IV push injection     parenteral nutrition - ADULT compounded formula CYCLE     potassium chloride (KLOR-CON) Packet 20-40 mEq     potassium chloride 10 mEq in 100 mL intermittent infusion with 10 mg lidocaine     potassium chloride 10 mEq in 100 mL sterile water intermittent infusion (premix)     potassium chloride 20 mEq in 50 mL intermittent infusion     potassium chloride SA (K-DUR/KLOR-CON M) CR tablet 20-40 mEq     prochlorperazine (COMPAZINE) tablet 10 mg    Or     prochlorperazine (COMPAZINE) injection 10 mg     sodium chloride (PF) 0.9% PF flush 3 mL     sodium chloride 0.9% infusion       Data   CBC    Recent Labs  Lab 07/31/18  0557 07/30/18  0548 07/30/18  0004 07/29/18  0544 07/28/18  0602   WBC 18.2* 20.5*  --  19.1* 21.6*   RBC 3.33* 3.25*  --  3.20* 3.14*   HGB 9.2* 8.8*  --  8.8* 8.7*   HCT 29.0* 28.5*  --  28.3* 27.6*   MCV 87 88  --  88 88   MCH 27.6 27.1  --  27.5 27.7   MCHC 31.7 30.9*  --  31.1* 31.5   RDW 17.9* 18.0*  --  17.9* 17.9*   * 487* 507* 512* 564*     CMP  Recent Labs  Lab 07/31/18  0557 07/30/18  0548 07/29/18  0544 07/28/18  0602  07/24/18  1333    138 140 139  < > 123*   POTASSIUM 4.5 4.4 4.8 4.2  < > 3.6   CHLORIDE 104 104 105 102  < > 80*   CO2 27 26 27 30  < > 30   ANIONGAP 7 8 9 6  < > 13   * 160* 202* 127*  < > 138*   BUN 32* 36* 37* 40*  < > 97*   CR 0.76 0.86 0.90 0.97  < > 2.03*   GFRESTIMATED >90 >90 88 81  < > 34*   GFRESTBLACK >90 >90 >90 >90  < > 41*   ANNAMARIE 8.6 8.7 8.7 8.6  < > 9.6   MAG 1.9 1.9 2.1 2.1  < > 3.0*   PHOS 3.3 3.6 4.1 3.0  < > 5.5*   PROTTOTAL  --  7.3  --   --   --  10.7*   ALBUMIN  --  2.2*  --   --   --  3.2*   BILITOTAL  --  0.2  --   --   --  0.4   ALKPHOS  --  131  --   --   --   169*   AST  --  20  --   --   --  17   ALT  --  15  --   --   --  17   < > = values in this interval not displayed.  INR    Recent Labs  Lab 07/31/18  0557 07/30/18  0548   INR 1.07 1.04       Results for orders placed or performed during the hospital encounter of 07/24/18   XR Abdomen Port 1 View    Narrative    XR ABDOMEN PORT 1 VW  7/24/2018 10:11 PM      HISTORY: ng tube placement;     COMPARISON: CT earlier same day    FINDINGS: Gastric tube is looped in the distal esophagus. Gaseous  distention of the stomach. No pleural effusion. No confluent pulmonary  opacities within the lung bases.      Impression    IMPRESSION: Gastric tube coiled within the distal esophagus which is  repositioned on the subsequent radiograph.    I have personally reviewed the examination and initial interpretation  and I agree with the findings.    KARMEN ODONNELL MD   XR Abdomen Port 1 View    Narrative    Single view of the abdomen  7/24/2018 11:34 PM      HISTORY: NG tube placement    COMPARISON: Radiograph earlier same day    FINDINGS: Gastric tube is repositioned, tip projecting over the  thoracic fundus. Gaseous distention of the stomach, decreased since  prior. No pleural effusion. No confluent pulmonary opacities within  the lung bases.      Impression    IMPRESSION: Gastric tube repositioning, in proper position over the  fundal/body region.    I have personally reviewed the examination and initial interpretation  and I agree with the findings.    KARMEN ODONNELL MD

## 2018-07-31 NOTE — PLAN OF CARE
Problem: Pain, Chronic (Adult)  Goal: Acceptable Pain Control/Comfort Level  Patient will demonstrate the desired outcomes by discharge/transition of care.   Outcome: No Change    VSS on RA, afebrile. Pain in lower abdomen manageable with Dilaudid PCA 0.5mg q 10 mins, max dose 3. C/o nausea once, IV zofran given x1 with relief. NG to LIS. Plans to place vented G tube in IR scheduled for 8/1/2018. NPO on 8/1 at 0000 (except for sips with meds), then nothing 2 hrs prior to procedure. Scrub x3 prior to procedure. TPN cyclic infusing. Fentanyl patch in place. UO and ileostomy output adequate. Up ad juan and independent. Continue plan of care.

## 2018-08-01 NOTE — PLAN OF CARE
"Problem: Patient Care Overview  Goal: Plan of Care/Patient Progress Review  /88 (BP Location: Left arm)  Pulse 104  Temp 97.1  F (36.2  C) (Oral)  Resp 16  Ht 1.753 m (5' 9\")  Wt 67.7 kg (149 lb 4.8 oz)  SpO2 98%  BMI 22.05 kg/m2     A&Ox4, afebrile, tachycardic, AOVSS on RA. NPO since midnight for placement of venting G-tube in IR today at 1500. Up ad juan. Pain to abdomen well managed w/ PCA Dilaudid @ 0.5mg q10min and fentanyl patch on L deltoid. Nausea managed with PRN Zofran x 1, pt reported relief. 2-lumen RPICC infusing cycled TPN and lipids, 2nd lumen infusing NS @ 50mL/hr. NG tube in place, set to L/I suction w/ dark green output. Adequate UOP. Ileostomy intact, small green liquid output. Will continue to monitor and follow POC.        "

## 2018-08-01 NOTE — PROGRESS NOTES
CLINICAL NUTRITION SERVICES - REASSESSMENT NOTE     Nutrition Prescription    RECOMMENDATIONS FOR MDs/PROVIDERS TO ORDER:  Recommend to continue PN therapy for full nutritional support     Malnutrition Status:    Severe malnutrition in the context of acute illness      Recommendations already ordered by Registered Dietitian (RD):  None    Future/Additional Recommendations:  None     EVALUATION OF THE PROGRESS TOWARD GOALS   Diet: Remains NPO for oral intakes     Nutrition Support: PN initiated on 7/25 with new formula consisting of 2400 ml (cycled x 12 hrs), Dex of 315 g, 100 g AA and 250 ml lipids x  6 days per week. Regimen provides 1900 kcals (30 kcals/kg) and 100 g PRO (1.5 g/kg)      Intake: Ave PN intakes received x past 7 days = 1844 ml    which provides 1460 kcals (22 kcals/kg)and 77 g PRO (1.2 g/kg).    NEW FINDINGS   S/P placement of venting G tube in IR today (NGT tube remains in placed, but clamped since completion of IR procedure).    Weight: 68.4 kg (today), 64.7 kg (7/25); Wt trending upward over past week, likely d/t increased fluids given to prevent dehydration d/t pt with high NGT outputs over the past week.    Labs:  Na ++, BUN/Cr, K+/Mg/PO4; levels WNL, previous electrolyte abnormalities on admit have resolved.   (7/30), 227 (7/25); trended downward possible d/t holding of lipids on 7/30. Noted check of TG on 7/25 showed lipids running during lab draw and hence caused elevated level.     GI: Significant stooling (via ileostomy) noted over the past week. Concern for Zinc depletion.       MALNUTRITION  % Intake: < 75% for > 7 days (non-severe)  % Weight Loss: None noted  Subcutaneous Fat Loss: Facial region, Upper arm and Thoracic/intercostal: Moderate  Muscle Loss: Temporal, Facial & jaw region, Thoracic region (clavicle, acromium bone, deltoid, trapezius, pectoral),  Upper arm (bicep, tricep), Upper leg (quadricep, hamstring),  Patellar region and Posterior calf: Moderate  Fluid  Accumulation/Edema: None noted  Malnutrition Diagnosis: Severe malnutrition in the context of acute illness    Previous Goals   Total avg nutritional intake to meet a minimum of 30 kcal/kg and 1.3 g PRO/kg daily (per dosing wt 65 kg).     Evaluation: Not met    Previous Nutrition Diagnosis  Unintended weight loss related to dependent on PN, but formulation underfeeding pt in addition to pt having increased fluid losses secondary to N/V and ostomy output as evidenced by wt loss of 4.7 kg (6.8% loss) x past 2 weeks and home PN meeting only 25 kcals/kg.      Evaluation: No longer applicable, nutrition diagnosis changed below    CURRENT NUTRITION DIAGNOSIS  Inadequate parenteral nutrition infusion related to dependent on PN therapy d/t altered GI function inhibiting po, but goal PN infusion vol not met as evidenced by Ave PN intakes received x past 7 days meeting only 22 kcals/kg and 1.2 g PRO/kg.      INTERVENTIONS  Implementation  Reviewed with pt wt status, labs with improvement in PN regimen.    Goals  Total avg nutritional intake to meet a minimum of 30 kcal/kg and 1.3 g PRO/kg daily (per dosing wt 65 kg).    Monitoring/Evaluation  Progress toward goals will be monitored and evaluated per protocol.    Tash Yip RD,LD  Unit pager [phone]

## 2018-08-01 NOTE — PROGRESS NOTES
Interventional Radiology Pre-Procedure Sedation Assessment   Time of Assessment: 12:12 PM    Expected Level: Moderate Sedation    Indication: Sedation is required for the following type of Procedure: GI    Sedation and procedural consent: Risks, benefits and alternatives were discussed with Patient    PO Intake: Appropriately NPO for procedure    ASA Class: Class 3 - SEVERE SYSTEMIC DISEASE, DEFINITE FUNCTIONAL LIMITATIONS.    Mallampati: Grade 2:  Soft palate, base of uvula, tonsillar pillars, and portion of posterior pharyngeal wall visible    Lungs: Lungs Clear with good breath sounds bilaterally    Heart: Normal heart sounds and rate    History and physical reviewed and no updates needed. I have reviewed the lab findings, diagnostic data, medications, and the plan for sedation. I have determined this patient to be an appropriate candidate for the planned sedation and procedure and have reassessed the patient IMMEDIATELY PRIOR to sedation and procedure.    Jamari Muñoz MD

## 2018-08-01 NOTE — PROGRESS NOTES
Patient Name: Sebas Lopez  Medical Record Number: [id number]  Today's Date: 8/1/2018      Procedure end time: 1240    Report given to: Linda DUGGAN     1230-Rc'd pt from Nellie DUGGAN. Pt supine on procedure table, VSS, no s/s acute distress noted. See Vs flowsheet, MAR for further information. Pt tolerated procedure well. G tube to LLQ 18Fr LOT [id number] EXP 2021-04-01

## 2018-08-01 NOTE — PROCEDURES
Interventional Radiology Brief Post Procedure Note    Procedure: Percutaneous gastrostomy tube for decompression    Proceduralist: Wilson Jose MD    Assistant: IR Fellow Physician, Jericho Guzman MD and None    Time Out: Prior to the start of the procedure and with procedural staff participation, I verbally confirmed the patient s identity using two indicators, relevant allergies, that the procedure was appropriate and matched the consent or emergent situation, and that the correct equipment/implants were available. Immediately prior to starting the procedure I conducted the Time Out with the procedural staff and re-confirmed the patient s name, procedure, and site/side. (The Joint Commission universal protocol was followed.)  Yes        Sedation: IR Nurse Monitored Care   Post Procedure Summary:  Prior to the start of the procedure and with procedural staff participation, I verbally confirmed the patient s identity using two indicators, relevant allergies, that the procedure was appropriate and matched the consent or emergent situation, and that the correct equipment/implants were available. Immediately prior to starting the procedure I conducted the Time Out with the procedural staff and re-confirmed the patient s name, procedure, and site/side. (The Joint PlanStan universal protocol was followed.)  Yes       Sedatives: Fentanyl and Midazolam (Versed)    Vital signs, airway and pulse oximetry were monitored and remained stable throughout the procedure and sedation was maintained until the procedure was complete.  The patient was monitored by staff until sedation discharge criteria were met.    Patient tolerance: Patient tolerated the procedure well with no immediate complications.    Time of sedation in minutes: 30 Minutes minutes from beginning to end of physician one to one monitoring.          Findings: G tube placed    Estimated Blood Loss: Minimal    Fluoroscopy Time:  minute(s)    SPECIMENS:  None    Complications: 1. None     Condition: Stable    Plan: NPO for 4 hours    Comments: See dictated procedure note for full details.    Jericho Guzman MD

## 2018-08-01 NOTE — PLAN OF CARE
"Problem: Pain, Chronic (Adult)  Goal: Identify Related Risk Factors and Signs and Symptoms  Related risk factors and signs and symptoms are identified upon initiation of Human Response Clinical Practice Guideline (CPG).   Outcome: No Change    9731-0342: VSS, afebrile. Sat-ing well on RA. Pt c/o worsening abdominal pain this AM, dilaudid PCA had been increased to 0.6mg Q10min w/ hourly limit 3.6mg on night shift. Additional 1-time dose 2mg IV dilaudid given per palliative MD's. Fentanyl patch increased to 125mcg, new patches applied to R deltoid. Also c/o nausea, IV zofran x2 and IV compazine given x1. Pt went down to IR for G-tube placement. Site CDI, draining to gravity. Order to remove NG, however pt is declining NG removal until \"we know the tube is working,\" so NG clamped. Pt up ad juan. Parents visiting, very supportive. Continue to monitor and w/ POC.       "

## 2018-08-01 NOTE — PROGRESS NOTES
[facility], [place]  Palliative Care Progress Note    Patient: Sebas Lopez  Date of Admission:  7/24/2018    Recommendations:  -Hydromorphone 2 mg IV x1 given for uncontrolled pain   -Increase Fentanyl patch to 125 mcg/hr (ordered)  -Continue Hydromorphone PCA with 0.6 mg q10min  -Will plan to transition to SL prn meds tomorrow     Assessment  Sebas Lopez is a 53 year old male with history of ulcerative colitis now with peritoneal carcinomatosis from sigmoid adenocarcinoma s/p ileostomy in 7/17 and colonic stent placement 3/19. He presented with increased abdominal pain, nausea, and vomiting secondary to SBO on 7/24, which did not resolve with medical management, so is getting a venting g-tube placed today.      Palliative care was is following for pain management.     Symptoms:   Pain--abdominal, 2/2 SBO. PTA was on Fentanyl 37 mcg/hr patch and Oxycodone 20-30 mg q4h prn. Hydromorphone PCA increased from 0.5 mg to 0.6 mg this morning. Sebas was in a great deal of pain when I saw him. He was watching his PCA and pushing it every 10 minutes to try to catch up. He had used 8.5 mg since 0700 this morning without relief, and 30.4 mg yesterday, which is double what he used two days prior. I ordered him a one time dose of 2mg IV dilaudid. He was then leaving to get his venting g-tube placed; the IR RN agreed to follow up on pain control.     Pain management was discussed with Sebas and heme/onc. The plan was created in a collaborative fashion, and the patient's response to the current recommendations is: engaged and agreeable.       These recommendations have been discussed with Laure Gr.    ALEXA Corbin CNP  Palliative Care Consult Team  Pager: [phone]    [facility] Inpatient Team Consult pager [phone] (M-F 8-4:30)  After-hours Answering Service [phone]   Palliative Clinic: [phone]     35 minutes spent with patient, with >50% counseling and in care  coordination.     Interval History:   IR today for venting Gastric tube placement. Pain not well controlled. Parents at bedside.          Medications:   I have reviewed this patient's medication profile and medications during this hospitalization.    TPN/Lipids    Fentanyl 75 mcg/hr patch   Hydromorphone PCA 0.6 mg [id number] 30.4 mg yesterday        Review of Systems:   A comprehensive ROS has been negative other than stated in the HPI and below:   Palliative Symptom Review (0=no symptom/no concern, 1=mild, 2=moderate, 3=severe):  Pain: 3  Fatigue: 3  Nausea: 3  Constipation: 3        Physical Exam:   Vital Signs: Temp: 95.9  F (35.5  C) Temp src: Oral BP: 116/81 Pulse: 99 Heart Rate: 108 Resp: 18 SpO2: 98 % O2 Device: None (Room air)    Weight: 150 lbs 12.71 oz    Constitutional: Pleasant male, seen lying in hospital bed, restless. In moderate distress due to pain.   Head: Ng tube in place. MMM.   Extremities: No edema.  Pulm: Non-labored breathing, on room air. Speaking in complete sentences.    Musculoskeletal: CONNOLLY. No obvious malformations.   Skin: No jaundice. No concerning rashes or lesions on exposed areas.   Neuro: A/O x 4. Face symmetrical. PERRL. EOMI.   Psych: Speech clear. Appears anxious and in pain. Memory and insight intact.      Data Reviewed:     ROUTINE ICU LABS (Last four results)  CMP    Recent Labs  Lab 08/01/18  0543 07/31/18  0557 07/30/18  0548 07/29/18  0544    138 138 140   POTASSIUM 4.5 4.5 4.4 4.8   CHLORIDE 105 104 104 105   CO2 26 27 26 27   ANIONGAP 7 7 8 9   * 141* 160* 202*   BUN 30 32* 36* 37*   CR 0.73 0.76 0.86 0.90   GFRESTIMATED >90 >90 >90 88   GFRESTBLACK >90 >90 >90 >90   ANNAMARIE 8.5 8.6 8.7 8.7   MAG 1.8 1.9 1.9 2.1   PHOS 3.0 3.3 3.6 4.1   PROTTOTAL  --   --  7.3  --    ALBUMIN  --   --  2.2*  --    BILITOTAL  --   --  0.2  --    ALKPHOS  --   --  131  --    AST  --   --  20  --    ALT  --   --  15  --      CBC    Recent Labs  Lab 08/01/18  0543 07/31/18  2078  07/31/18  0557 07/30/18  0548   WBC 16.6* 17.9* 18.2* 20.5*   RBC 3.19* 3.18* 3.33* 3.25*   HGB 8.7* 8.8* 9.2* 8.8*   HCT 27.9* 27.8* 29.0* 28.5*   MCV 88 87 87 88   MCH 27.3 27.7 27.6 27.1   MCHC 31.2* 31.7 31.7 30.9*   RDW 17.9* 18.0* 17.9* 18.0*    418 463* 487*     INR    Recent Labs  Lab 07/31/18  2359 07/31/18  0557 07/30/18  0548   INR 1.06 1.07 1.04

## 2018-08-01 NOTE — PLAN OF CARE
Problem: Patient Care Overview  Goal: Plan of Care/Patient Progress Review  Outcome: No Change    VSS. Afebrile. Pain comfortably managed by Dilaudid PCA (0.5 mg q10 min). Fentanyl patch in place on left deltoid. Denied nausea and vomiting. Cycled TPN and lipids infusing. UOP adequate. Ileostomy intact with adequate green/loose output. NG to LIS with adequate output. TPA administered into grey lumen of PICC with good blood return noted. Patient to be NPO at midnight for placement of venting G-tube in IR tomorrow at around 1500. Continue with POC.

## 2018-08-01 NOTE — PROGRESS NOTES
[facility], [place]    Hematology / Oncology Progress Note    Date of Admission: 7/24/2018  Hospital Day #: 8   Date of Service (when I saw the patient): 08/01/2018     Assessment & Plan   Sebas Lopez is a 53 year old male with PMHx significant for ulcerative colitis and sigmoid adenocarcinoma with peritoneal carcinomatosis s/p ileostomy 7/2017 and colonic stent 3/19 (with most recent revision 7/5) who presents from clinic with 3-4 day history of nausea/vomiting and increased abdominal pain, found to have a small bowel obstruction on CT scan.    Plan  - IR placed venting-G tube 8/1- tolerated well; Remove NG tube  - continue pain regimen per Palliative Care -Fentanyl increased to 125mcg; Plan transition from PCA to high concentration PO dilaudid PRN     #Small bowel obstruction. Transition point in LLQ.  #Nausea and vomiting-improved.  #Increased abdominal pain.   Symptoms started about 3-4 days ago. Multiple emesis per day, notes abdominal pain would improve with emesis. Explains emesis appeared similar to ileostomy output in color/consistency. Ileostomy output seemed to remain the same about of output/mildly decrease. No blood in ileostomy output. Tried to keep up with PO fluid intake but could not. Found to have a SBO with dilated loops of small bowel and transition point in LLQ on CT. Additionally labs demonstrated an PAULINA and electrolyte abnormalities (See below).   -NG to LIS for decompression. NPO (but patient drinking lots of water).  -IVF replacement.  -IV medications as able. Home meds (bowel meds, vitamins, etc) held d/t strict NPO status and NG.   -IV antiemetics to treat sx.  -Medical mgmt at this time.  -Colorectal surgery consulted, ? Any surgical mgmt for SBO. They do not have any intervention to offer and recommend continuing with conservative/medical mgmt.  - IR placed venting G-tube 8/1, tolerated well; Remove NG tube.     -Palliative care team consulted, patient  follows with outpatient. Increase Fentanyl patch to 125 mg/72hrs and initiated on dilaudid PCA (1.5 mg loading dose f/b 0.5-1 mg boluses q10 minutes). Plan to transition from PCA to high concentration PO dilaudid PRN 8/2AM.     #Hypokalemia/Hyponatremia/Hypochoremia. Resolved.  #Hypermagnesemia/Hyperphosphatemia. Resolved.  #PAULINA. Cr on admission 2.66. Improving, Cr 1.31 on 7/26. Resolved.  Likely 2/2 above nausea/vomiting/SBO leading to dehydration. Skin with some tenting on AM exam 7/25. On TPN support (see below).   -Replace lytes per protocol prn.   -IVF  ml/hr (+TPN volume), now decreased to 50 ml/hr +TPN volume.  -Daily BMP.    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    Follows with Dr. Long outpatient. S/p ex lap 7/2017 with small bowel resection and end ileostomy. S/p 6 cycles of FOLFOX. Admitted on 12/6/2017 for ex-lap with aborted HIPEC due to diffuse carcinomatosis. Repeat CT scan showed worsening peritoneal carcinomatosis. He was admitted to the hospital with worsening colon distention and colon stent was placed which improved his symptoms. He resumed FOLFOX C7 3/8/18 f/b hospitalization for bowel obstruction and stent replacement 3/19/2018. Continued with FOLFOX, last cycle #13 6/15/18. Repeat CT 6/27 with slightly progressed peritoneal carcinomatosis, increased colonic and rectal fluid favoring colitis. Per Dr. Long's note 6/28 next treatment options would likely be irinotecan and panitumumab. Has been unable to begin d/t recent hospitalizations.   -Will need follow up scheduled at discharge.     #Leukocytosis. No e/o infection at this time.  #Anemia.  Has had a mild leukocytosis on follow up labs in clinic. Admission WBC count 16.7-->elevated to 18.7 7/25. Anemia 2/2 chronic illness.   -BCx drawn 7/25, NGTD.  -Afebrile, monitor at this time. Low threshold to start antibiotics if spikes.  -Will monitor with daily CBC.    # Pain Assessment:  Current Pain Score 8/1/2018    Patient currently in pain? yes   Pain score (0-10) -   Pain location Abdomen   Pain descriptors Aching;Pressure   - Sebas is experiencing pain due to metastatic sigmoid adenocarcinoma. Pain management was discussed and the plan was created in a collaborative fashion.  Sebas's response to the current recommendations: engaged  - Opioid regimen: Fentanyl patch 37 mcg/72hrs, IV dilaudid 1-1.5 mg q2hrs prn for breakthrough. HELD PTA PO oxycodone d/t NPO status.  - Bowel regimen: defer d/t SBO and diarrhea through ileostomy. Tincture of opium and imodium held d/t NPO status.    FEN:  -IVF @ 50 ml/hr + TPN volume  -PRN lyte replacement  -NPO, continue PTA TPN support, sips and limited popsicles for comfort    #Severe malnutrition in context of acute illness. >2% weight loss in 1 week, moderate subcutaneous fat loss, muscle loss in temporal/facial/jaw/ and thoracic region.   -Continue TPN support, optimize nutritional support.    Prophy/Misc:  -VTE: Restart enoxaparin 40mg q24h   -GI/PUD: IV Protonix    Code status: Full    Disposition: Pending improvement in acute issues, likely another 2-3 days pending pain management.      Patient and plan discussed with staff, Dr. Babcock.    EMERSON Prasad-BC  Hematology/Oncology  #[id number]      Interval History   Nursing notes reviewed, ongoing abdominal discomfort managed with current regimen including PCA.      Today, Sebas reports being anxious and feeling more distended; he is worried as he is having more abdominal pain and he wants to know if he should have more imaging prior to G-tube placement; he has continued to have good output from his ostomy.  No vomiting today, has g-tube to gravity this afternoon. He continues to ambulate multiple times per day.    Denies HA, CP, SOB, dysuria, edema, and voiding without difficulty.     Physical Exam   Temp: 95.9  F (35.5  C) Temp src: Oral BP: 117/76 Pulse: 99 Heart Rate: 97 Resp: 13 SpO2: 97 % O2 Device: Nasal cannula Oxygen Delivery: 2  LPM  Vitals:    07/30/18 0750 07/31/18 0948 08/01/18 0812   Weight: 67.2 kg (148 lb 1.6 oz) 67.7 kg (149 lb 4.8 oz) 68.4 kg (150 lb 12.7 oz)     Vital Signs with Ranges  Temp:  [95.5  F (35.3  C)-97.8  F (36.6  C)] 95.9  F (35.5  C)  Pulse:  [] 99  Heart Rate:  [] 97  Resp:  [13-18] 13  BP: (111-129)/(76-88) 117/76  SpO2:  [96 %-99 %] 97 %  I/O last 3 completed shifts:  In: 3858.8 [P.O.:120; I.V.:1220]  Out: 2595 [Urine:275; Emesis/NG output:1600; Stool:720]    Constitutional: Pleasant male seen sitting up in bed, in NAD. Alert and interactive.  HEENT: NCAT, anicteric sclerae, conjunctiva clear. NG in place.  Respiratory: Non-labored breathing, good air exchange. Lungs are clear to auscultation bilaterally, without wheezing, crackles or rhonchi. No cough noted.   Cardiovascular: Regular rate and rhythm with no murmur, rub or gallop.  GI: intermittent BS. Soft, tender with palpation in lower abdomen. No rebound. Watery green ostomy output in bag, stoma appears normal/pink. New venting G-Tube in left upper abdomen CDI.   Skin: Warm and dry. No rashes or lesions on exposed surfaces.  Musculoskeletal: Extremities grossly normal. No tenderness or edema present.   Neurologic: Alert, oriented to situation, speech normal, answering questions appropriately. Moves all extremities spontaneously. Grossly non-focal.  Neuropsychiatric: Mentation and affect normal/appropriate.  VAD: PICC is c/d/i with no erythema, drainage, or tenderness.    Medications   Current Facility-Administered Medications   Medication     benzocaine-menthol (CEPACOL) 15-3.6 MG lozenge 1 lozenge     dextrose 10 % 1,000 mL infusion     diphenhydrAMINE (BENADRYL) injection 50 mg     fentaNYL (DURAGESIC) 100 mcg/hr 72 hr patch 1 patch     fentaNYL (DURAGESIC) 25 mcg/hr 72 hr patch 1 patch     fentaNYL (DURAGESIC) Patch in Place     fentaNYL (DURAGESIC) patch REMOVAL     fentaNYL (PF) (SUBLIMAZE) injection 25-50 mcg     flumazenil (ROMAZICON)  injection 0.2 mg     HOLD: Metformin and metformin containing medications if patient received IV contrast     HYDROmorphone (DILAUDID) PCA 1 mg/mL OPIOID TOLERANT     lipids (INTRALIPID) 20 % infusion 250 mL     May take regular AM medications except those listed below     Medication Instruction     midazolam (VERSED) injection 0.5-1 mg     naloxone (NARCAN) injection 0.4 mg     ondansetron (ZOFRAN) injection 8 mg     pantoprazole (PROTONIX) 40 mg IV push injection     parenteral nutrition - ADULT compounded formula CYCLE     parenteral nutrition - ADULT compounded formula CYCLE     potassium chloride (KLOR-CON) Packet 20-40 mEq     potassium chloride 10 mEq in 100 mL intermittent infusion with 10 mg lidocaine     potassium chloride 10 mEq in 100 mL sterile water intermittent infusion (premix)     potassium chloride 20 mEq in 50 mL intermittent infusion     potassium chloride SA (K-DUR/KLOR-CON M) CR tablet 20-40 mEq     prochlorperazine (COMPAZINE) tablet 10 mg    Or     prochlorperazine (COMPAZINE) injection 10 mg     sodium chloride (PF) 0.9% PF flush 3 mL     sodium chloride 0.9% infusion       Data   CBC    Recent Labs  Lab 08/01/18  0543 07/31/18  2359 07/31/18  0557 07/30/18  0548   WBC 16.6* 17.9* 18.2* 20.5*   RBC 3.19* 3.18* 3.33* 3.25*   HGB 8.7* 8.8* 9.2* 8.8*   HCT 27.9* 27.8* 29.0* 28.5*   MCV 88 87 87 88   MCH 27.3 27.7 27.6 27.1   MCHC 31.2* 31.7 31.7 30.9*   RDW 17.9* 18.0* 17.9* 18.0*    418 463* 487*     CMP    Recent Labs  Lab 08/01/18  0543 07/31/18  0557 07/30/18  0548 07/29/18  0544    138 138 140   POTASSIUM 4.5 4.5 4.4 4.8   CHLORIDE 105 104 104 105   CO2 26 27 26 27   ANIONGAP 7 7 8 9   * 141* 160* 202*   BUN 30 32* 36* 37*   CR 0.73 0.76 0.86 0.90   GFRESTIMATED >90 >90 >90 88   GFRESTBLACK >90 >90 >90 >90   ANNAMARIE 8.5 8.6 8.7 8.7   MAG 1.8 1.9 1.9 2.1   PHOS 3.0 3.3 3.6 4.1   PROTTOTAL  --   --  7.3  --    ALBUMIN  --   --  2.2*  --    BILITOTAL  --   --  0.2  --     ALKPHOS  --   --  131  --    AST  --   --  20  --    ALT  --   --  15  --      INR    Recent Labs  Lab 07/31/18  2359 07/31/18  0557 07/30/18  0548   INR 1.06 1.07 1.04       Results for orders placed or performed during the hospital encounter of 07/24/18   XR Abdomen Port 1 View    Narrative    XR ABDOMEN PORT 1 VW  7/24/2018 10:11 PM      HISTORY: ng tube placement;     COMPARISON: CT earlier same day    FINDINGS: Gastric tube is looped in the distal esophagus. Gaseous  distention of the stomach. No pleural effusion. No confluent pulmonary  opacities within the lung bases.      Impression    IMPRESSION: Gastric tube coiled within the distal esophagus which is  repositioned on the subsequent radiograph.    I have personally reviewed the examination and initial interpretation  and I agree with the findings.    KARMEN ODONNELL MD   XR Abdomen Port 1 View    Narrative    Single view of the abdomen  7/24/2018 11:34 PM      HISTORY: NG tube placement    COMPARISON: Radiograph earlier same day    FINDINGS: Gastric tube is repositioned, tip projecting over the  thoracic fundus. Gaseous distention of the stomach, decreased since  prior. No pleural effusion. No confluent pulmonary opacities within  the lung bases.      Impression    IMPRESSION: Gastric tube repositioning, in proper position over the  fundal/body region.    I have personally reviewed the examination and initial interpretation  and I agree with the findings.    KARMEN ODONNELL MD

## 2018-08-02 NOTE — PLAN OF CARE
Problem: Pain, Chronic (Adult)  Goal: Identify Related Risk Factors and Signs and Symptoms  Related risk factors and signs and symptoms are identified upon initiation of Human Response Clinical Practice Guideline (CPG).   0728-5467    VSS. No acute events this shift. Utilizing PCA 0.6mg [id number] PRN and 20mg sublingual dilaudid q3h for pain control. Goal is for pt to transition to PO pain medication tomorrow. Pt is voicing good understanding of plan of care. Denies nausea.

## 2018-08-02 NOTE — PLAN OF CARE
Problem: Patient Care Overview  Goal: Plan of Care/Patient Progress Review  Outcome: No Change  3440-7438: Tachycardic, other VSS on RA. Triggered sepsis protocol, lactic acid 0.9. Pain remains about the same per pt, continues on dilaudid PCA at 0.6 mg available q10 mins. See eMAR for shift totals. Fentanyl patches (x2) on R deltoid. Nausea managed with 10 mg IV compazine x1. NG tube pulled around 2330. G tube to gravity with 650 ml green output, site C/D/I. Lipids/cycled TPN, NS at 50 ml/hr infusing via PICC. Voiding spontaneously. Ileostomy intact with adequate output. Up independently in room, encouraged ambulation as tolerated. Plan is to attempt transition to PO pain medications this AM per NP note. Continue with POC.

## 2018-08-02 NOTE — PROGRESS NOTES
"[facility], [place]  Palliative Care Progress Note    Patient: Sebas Lopez  Date of Admission:  7/24/2018    Recommendations:  -Hydromorphone 15-20 mg SL q3h prn -- please use first line  -Increased Fentanyl patch to 200 mcg/hr   -Continue Hydromorphone PCA 0.6 mg q10min for backup during transition to SL     Assessment  Sebas Lopez is a 53 year old male with history of ulcerative colitis now with peritoneal carcinomatosis from sigmoid adenocarcinoma s/p ileostomy in 7/17 and colonic stent placement 3/19. He presented with increased abdominal pain, nausea, and vomiting secondary to SBO on 7/24. Did not resolve with medical management, so a venting g-tube was placed on 8/1.      Palliative care is following for pain management.     Symptoms:   Pain--abdominal pain likely 2/2 disease progression causing obstruction. Used 54.9 mg of IV Hydromorphone yesterday, between PCA and a one time dose. Sebas said his pain was \"okay but not great.\" During my visit he was restless and reported abdominal cramps multiple times. He was continually checking his PCA to see if it was time to get another dose, and had used 9.1 mg since it was cleared at 0630 this morning. We discussed transitioning to SL and agreed to a slow transition, while leaving the PCA on for backup.     Pain management was discussed with Sebas and heme/onc. The plan was created in a collaborative fashion, and the patient's response to the current recommendations is: engaged and agreeable.       These recommendations have been discussed with Laure Gr.    ALEXA Corbin CNP  Palliative Care Consult Team  Pager: [phone]    [facility] Inpatient Team Consult pager [phone] (M-F 8-4:30)  After-hours Answering Service [phone]   Palliative Clinic: [phone]     45 minutes spent with patient, with >50% counseling and in care coordination.     Interval History:   Venting Gastric tube placed yesterday; now to gravity " and draining. Ongoing pain control issues. Had 4 clear liquid bowel movements overnight and having green liquid ostomy output.          Medications:   I have reviewed this patient's medication profile and medications during this hospitalization.    TPN/Lipids    Fentanyl 125 mcg/hr patch   Hydromorphone PCA 0.6 mg [id number]--[id number] 52.9 mg yesterday    Hydromorphone 2 mg IV x1 yesterday         Review of Systems:   A comprehensive ROS has been negative other than stated in the HPI and below:   Palliative Symptom Review (0=no symptom/no concern, 1=mild, 2=moderate, 3=severe):  Pain: 2  Fatigue: 3  Nausea: 1  Constipation: 0        Physical Exam:   Vital Signs: Temp: 99.4  F (37.4  C) Temp src: Axillary BP: 128/79 Pulse: 115 Heart Rate: 112 Resp: 20 SpO2: 97 % O2 Device: None (Room air) Oxygen Delivery: 2 LPM  Weight: 152 lbs 11.2 oz    Constitutional: Pleasant male, seen sitting on the edge of hospital bed.  Head: Normocephalic. MMM.   Extremities: No edema.  Pulm: Non-labored breathing, on room air. Speaking in complete sentences.    Musculoskeletal: CONNOLLY. No obvious malformations.   Skin: No jaundice. No concerning rashes or lesions on exposed areas.   Neuro: A/O x 4. Face symmetrical. PERRL. EOMI.   Psych: Speech clear. Appears anxious and in pain. Memory and insight intact.      Data Reviewed:     CMP    Recent Labs  Lab 08/02/18  0434 08/01/18  0543 07/31/18  0557 07/30/18  0548    138 138 138   POTASSIUM 4.3 4.5 4.5 4.4   CHLORIDE 107 105 104 104   CO2 26 26 27 26   ANIONGAP 5 7 7 8   * 162* 141* 160*   BUN 28 30 32* 36*   CR 0.73 0.73 0.76 0.86   GFRESTIMATED >90 >90 >90 >90   GFRESTBLACK >90 >90 >90 >90   ANNAMARIE 8.4* 8.5 8.6 8.7   MAG 1.9 1.8 1.9 1.9   PHOS 3.4 3.0 3.3 3.6   PROTTOTAL  --   --   --  7.3   ALBUMIN  --   --   --  2.2*   BILITOTAL  --   --   --  0.2   ALKPHOS  --   --   --  131   AST  --   --   --  20   ALT  --   --   --  15     CBC    Recent Labs  Lab 08/02/18  0434 08/01/18  0515  07/31/18  2359 07/31/18  0557   WBC 12.7* 16.6* 17.9* 18.2*   RBC 3.07* 3.19* 3.18* 3.33*   HGB 8.3* 8.7* 8.8* 9.2*   HCT 27.1* 27.9* 27.8* 29.0*   MCV 88 88 87 87   MCH 27.0 27.3 27.7 27.6   MCHC 30.6* 31.2* 31.7 31.7   RDW 18.1* 17.9* 18.0* 17.9*    435 418 463*     INR    Recent Labs  Lab 07/31/18 2359 07/31/18  0557 07/30/18  0548   INR 1.06 1.07 1.04

## 2018-08-02 NOTE — PROGRESS NOTES
[facility], [place]    Hematology / Oncology Progress Note    Date of Admission: 7/24/2018  Hospital Day #: 9   Date of Service (when I saw the patient): 08/02/2018     Assessment & Plan   Sebas Lopez is a 53 year old male with PMHx significant for ulcerative colitis and sigmoid adenocarcinoma with peritoneal carcinomatosis s/p ileostomy 7/2017 and colonic stent 3/19 (with most recent revision 7/5) who presents from clinic with 3-4 day history of nausea/vomiting and increased abdominal pain, found to have a small bowel obstruction on CT scan.    Plan  - IR placed venting-G tube 8/1- tolerated well; Remove NG tube last night  - continue pain regimen per Palliative Care -Fentanyl increased to 200mcg; 15-20mg dilaudid PO Q3 hrs PRN, keep PCA for breakthrough. He may need PCA at home given his pain needs.   - trial 1L bolus QAM to ensure he can remain euvolemic despite significant output from Gtube and ostomy.     #Small bowel obstruction. Transition point in LLQ.  #Nausea and vomiting-improved.  #Increased abdominal pain.   Symptoms started about 3-4 days ago. Multiple emesis per day, notes abdominal pain would improve with emesis. Explains emesis appeared similar to ileostomy output in color/consistency. Ileostomy output seemed to remain the same about of output/mildly decrease. No blood in ileostomy output. Tried to keep up with PO fluid intake but could not. Found to have a SBO with dilated loops of small bowel and transition point in LLQ on CT. Additionally labs demonstrated an PAULINA and electrolyte abnormalities (See below).   -NG to LIS for decompression. NPO (but patient drinking lots of water).  -IVF replacement.  -IV medications as able. Home meds (bowel meds, vitamins, etc) held d/t strict NPO status and NG.   -IV antiemetics to treat sx.  -Medical mgmt at this time.  -Colorectal surgery consulted, ? Any surgical mgmt for SBO. They do not have any intervention to offer and  recommend continuing with conservative/medical mgmt.  - IR placed venting G-tube 8/1, tolerated well; Remove NG tube.     -Palliative care team consulted, patient follows with outpatient.- continue pain regimen per Palliative Care -Fentanyl increased to 200mcg; 15-20mg dilaudid PO Q3 hrs PRN, keep PCA for breakthrough. He may need PCA at home given his pain needs.     #Hypokalemia/Hyponatremia/Hypochoremia. Resolved.  #Hypermagnesemia/Hyperphosphatemia. Resolved.  #PAULINA. Cr on admission 2.66. Improving, Cr 1.31 on 7/26. Resolved.  Likely 2/2 above nausea/vomiting/SBO leading to dehydration. Skin with some tenting on AM exam 7/25. On TPN support (see below).   -Replace lytes per protocol prn.   -IVF  ml/hr (+TPN volume), now decreased to 50 ml/hr +TPN volume.  -Daily BMP.    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    Follows with Dr. Long outpatient. S/p ex lap 7/2017 with small bowel resection and end ileostomy. S/p 6 cycles of FOLFOX. Admitted on 12/6/2017 for ex-lap with aborted HIPEC due to diffuse carcinomatosis. Repeat CT scan showed worsening peritoneal carcinomatosis. He was admitted to the hospital with worsening colon distention and colon stent was placed which improved his symptoms. He resumed FOLFOX C7 3/8/18 f/b hospitalization for bowel obstruction and stent replacement 3/19/2018. Continued with FOLFOX, last cycle #13 6/15/18. Repeat CT 6/27 with slightly progressed peritoneal carcinomatosis, increased colonic and rectal fluid favoring colitis. Per Dr. Long's note 6/28 next treatment options would likely be irinotecan and panitumumab. Has been unable to begin d/t recent hospitalizations.   -Will need follow up scheduled at discharge.     #Leukocytosis. No e/o infection at this time.  #Anemia.  Has had a mild leukocytosis on follow up labs in clinic. Admission WBC count 16.7-->elevated to 18.7 7/25. Anemia 2/2 chronic illness.   -BCx drawn 7/25, NGTD.  -Afebrile, monitor at  this time. Low threshold to start antibiotics if spikes.  -Will monitor with daily CBC.    # Pain Assessment:  Current Pain Score 8/2/2018   Patient currently in pain? yes   Pain score (0-10) -   Pain location Abdomen   Pain descriptors Cramping   - Sebas is experiencing pain due to metastatic sigmoid adenocarcinoma. Pain management was discussed and the plan was created in a collaborative fashion.  Sebas's response to the current recommendations: engaged  - Opioid regimen: Fentanyl patch 37 mcg/72hrs, IV dilaudid 1-1.5 mg q2hrs prn for breakthrough. HELD PTA PO oxycodone d/t NPO status.  - Bowel regimen: defer d/t SBO and diarrhea through ileostomy. Tincture of opium and imodium held d/t NPO status.    FEN:  -IVF @ 50 ml/hr + TPN volume  -PRN lyte replacement  -NPO, continue PTA TPN support, sips and limited popsicles for comfort    #Severe malnutrition in context of acute illness. >2% weight loss in 1 week, moderate subcutaneous fat loss, muscle loss in temporal/facial/jaw/ and thoracic region.   -Continue TPN support, optimize nutritional support.    Prophy/Misc:  -VTE: Restart enoxaparin 40mg q24h   -GI/PUD: IV Protonix    Code status: Full    Disposition: Pending improvement in acute issues, likely another 2-3 days pending pain management.    Follow up: Has scheduled follow up with Dr. Long 8/9.     Patient and plan discussed with staff, Dr. Babcock.    Lauer Gr, [facility]  Hematology/Oncology  #[phone]      Interval History   Nursing notes reviewed, ongoing abdominal discomfort managed with current regimen including PCA.      Today, Sebas reports having continued pain, mostly throughout his lower abdomen.  He is feeling more distended; He reports the G-tube placement went OK; he has continued to have good output from his ostomy.  No nausea vomiting today, has g-tube to gravity. He continues to ambulate multiple times per day.    Denies HA, CP, SOB, dysuria, edema, and voiding without difficulty.     Physical Exam    Temp: 99.4  F (37.4  C) Temp src: Axillary BP: 128/79 Pulse: 115 Heart Rate: 112 Resp: 20 SpO2: 97 % O2 Device: None (Room air) Oxygen Delivery: 2 LPM  Vitals:    07/31/18 0948 08/01/18 0812 08/02/18 0728   Weight: 67.7 kg (149 lb 4.8 oz) 68.4 kg (150 lb 12.7 oz) 69.3 kg (152 lb 11.2 oz)     Vital Signs with Ranges  Temp:  [96.2  F (35.7  C)-99.4  F (37.4  C)] 99.4  F (37.4  C)  Pulse:  [115] 115  Heart Rate:  [] 112  Resp:  [16-20] 20  BP: (117-129)/(76-84) 128/79  SpO2:  [96 %-98 %] 97 %  I/O last 3 completed shifts:  In: 4389.7 [P.O.:850; I.V.:1200]  Out: 1825 [Urine:725; Emesis/NG output:650; Stool:450]    Constitutional: Pleasant male seen sitting up in bed, in NAD. Alert and interactive.  HEENT: NCAT, anicteric sclerae, conjunctiva clear. NG in place.  Respiratory: Non-labored breathing, good air exchange. Lungs are clear to auscultation bilaterally, without wheezing, crackles or rhonchi. No cough noted.   Cardiovascular: Regular rate and rhythm with no murmur, rub or gallop.  GI: intermittent BS. Soft, tender with palpation in lower abdomen. No rebound. Watery green ostomy output in bag, stoma appears normal/pink. New venting G-Tube in left upper abdomen CDI.   Skin: Warm and dry. No rashes or lesions on exposed surfaces.  Musculoskeletal: Extremities grossly normal. No tenderness or edema present.   Neurologic: Alert, oriented to situation, speech normal, answering questions appropriately. Moves all extremities spontaneously. Grossly non-focal.  Neuropsychiatric: Mentation and affect normal/appropriate.  VAD: PICC is c/d/i with no erythema, drainage, or tenderness.    Medications   Current Facility-Administered Medications   Medication     benzocaine-menthol (CEPACOL) 15-3.6 MG lozenge 1 lozenge     dextrose 10 % 1,000 mL infusion     diphenhydrAMINE (BENADRYL) injection 50 mg     enoxaparin (LOVENOX) injection 40 mg     fentaNYL (DURAGESIC) 100 mcg/hr 72 hr patch 1 patch     fentaNYL (DURAGESIC) 100  mcg/hr 72 hr patch 1 patch     fentaNYL (DURAGESIC) Patch in Place     fentaNYL (DURAGESIC) Patch in Place     [START ON 8/5/2018] fentaNYL (DURAGESIC) patch REMOVAL     fentaNYL (DURAGESIC) patch REMOVAL     fentaNYL (PF) (SUBLIMAZE) injection 25-50 mcg     flumazenil (ROMAZICON) injection 0.2 mg     HOLD: Metformin and metformin containing medications if patient received IV contrast     HYDROmorphone (DILAUDID) PCA 1 mg/mL OPIOID TOLERANT     HYDROmorphone (HIGH CONC) (DILAUDID) oral solution 15-20 mg     lipids (INTRALIPID) 20 % infusion 250 mL     May take regular AM medications except those listed below     Medication Instruction     midazolam (VERSED) injection 0.5-1 mg     naloxone (NARCAN) injection 0.4 mg     ondansetron (ZOFRAN) injection 8 mg     pantoprazole (PROTONIX) 40 mg IV push injection     parenteral nutrition - ADULT compounded formula CYCLE     parenteral nutrition - ADULT compounded formula CYCLE     potassium chloride (KLOR-CON) Packet 20-40 mEq     potassium chloride 10 mEq in 100 mL intermittent infusion with 10 mg lidocaine     potassium chloride 10 mEq in 100 mL sterile water intermittent infusion (premix)     potassium chloride 20 mEq in 50 mL intermittent infusion     potassium chloride SA (K-DUR/KLOR-CON M) CR tablet 20-40 mEq     prochlorperazine (COMPAZINE) tablet 10 mg    Or     prochlorperazine (COMPAZINE) injection 10 mg     sodium chloride (PF) 0.9% PF flush 3 mL     sodium chloride 0.9% infusion       Data   CBC    Recent Labs  Lab 08/02/18  0434 08/01/18  0543 07/31/18  2359 07/31/18  0557   WBC 12.7* 16.6* 17.9* 18.2*   RBC 3.07* 3.19* 3.18* 3.33*   HGB 8.3* 8.7* 8.8* 9.2*   HCT 27.1* 27.9* 27.8* 29.0*   MCV 88 88 87 87   MCH 27.0 27.3 27.7 27.6   MCHC 30.6* 31.2* 31.7 31.7   RDW 18.1* 17.9* 18.0* 17.9*    435 418 463*     CMP    Recent Labs  Lab 08/02/18  0434 08/01/18  0543 07/31/18  0557 07/30/18  0548    138 138 138   POTASSIUM 4.3 4.5 4.5 4.4   CHLORIDE 107  105 104 104   CO2 26 26 27 26   ANIONGAP 5 7 7 8   * 162* 141* 160*   BUN 28 30 32* 36*   CR 0.73 0.73 0.76 0.86   GFRESTIMATED >90 >90 >90 >90   GFRESTBLACK >90 >90 >90 >90   ANNAMARIE 8.4* 8.5 8.6 8.7   MAG 1.9 1.8 1.9 1.9   PHOS 3.4 3.0 3.3 3.6   PROTTOTAL  --   --   --  7.3   ALBUMIN  --   --   --  2.2*   BILITOTAL  --   --   --  0.2   ALKPHOS  --   --   --  131   AST  --   --   --  20   ALT  --   --   --  15     INR    Recent Labs  Lab 07/31/18  2359 07/31/18  0557 07/30/18  0548   INR 1.06 1.07 1.04       Results for orders placed or performed during the hospital encounter of 07/24/18   XR Abdomen Port 1 View    Narrative    XR ABDOMEN PORT 1 VW  7/24/2018 10:11 PM      HISTORY: ng tube placement;     COMPARISON: CT earlier same day    FINDINGS: Gastric tube is looped in the distal esophagus. Gaseous  distention of the stomach. No pleural effusion. No confluent pulmonary  opacities within the lung bases.      Impression    IMPRESSION: Gastric tube coiled within the distal esophagus which is  repositioned on the subsequent radiograph.    I have personally reviewed the examination and initial interpretation  and I agree with the findings.    KARMEN ODONNELL MD   XR Abdomen Port 1 View    Narrative    Single view of the abdomen  7/24/2018 11:34 PM      HISTORY: NG tube placement    COMPARISON: Radiograph earlier same day    FINDINGS: Gastric tube is repositioned, tip projecting over the  thoracic fundus. Gaseous distention of the stomach, decreased since  prior. No pleural effusion. No confluent pulmonary opacities within  the lung bases.      Impression    IMPRESSION: Gastric tube repositioning, in proper position over the  fundal/body region.    I have personally reviewed the examination and initial interpretation  and I agree with the findings.    KARMEN ODONNELL MD   IR Gastrostomy Tube Percutaneous Plcmnt    Narrative    Procedure: 8/1/2018.  1. Gastrostomy tube placement under fluoroscopic  guidance.    History: Past medical history of ulcerative colitis status post  ileostomy, sigmoid adenocarcinoma with peritoneal carcinomatosis,  status post colonic stents for recurrent bowel obstructions.  Percutaneous gastrostomy tube requested for decompression.    Comparison: CT abdomen pelvis 7/24/2018    Staff: Wilson Jose MD    Fellow: Jericho Guzman MD    Monitoring: Patient was placed on continuous monitoring supervised by  the IR nursing staff and IR attending. Patient remained stable  throughout the procedure.    Medications:  1. Fentanyl 100 mcg IV  2. Versed 2 mg IV  3. 1% lidocaine for local anesthesia  4. Glucagon 1 mg IV    Sedation time: 30 minutes    Fluoroscopy time: 4.2 minutes    Procedure/Findings: The patient understood the limitations,  alternatives, and risks of the procedure and requested the procedure  be performed. Both written and oral consent were obtained.    Nasogastric tube placed under fluoroscopic guidance. The left upper  quadrant was prepped and draped in the usual sterile fashion.  Intravenous glucagon was administered. The liver margins were  delineated with ultrasound and marked. Stomach inflated with air  through the nasogastric tube.     1% lidocaine was used for local anesthesia. Under fluoroscopic  guidance, gastropexy was made with two [employer] [facility] Saf-T-Pexy  T-fasteners. T fasteners secured. Needle gastrostomy made under  fluoroscopic guidance. Needle removed over guidewire. Track dilated  with the telescopic dilator and 22 [place] peel-away sheath advanced  into the stomach over guidewire. 18 [place] [facility]  gastrostomy tube  advanced over the guidewire through the peel-away  sheath into the stomach under fluoroscopic guidance. Gastrostomy tube  inflated and peel-away sheath removed. Position documented with  contrast, guidewire removed, and tube secured. Tube flushed with  saline. Sterile dressing applied. Gastrostomy tube placed to  gravity  drainage. Nasogastric tube removed. No immediate complication.    Estimate blood loss: less then 1 cc.      Impression    Impression: Uncomplicated 18 [place] [facility] gastrostomy  tube  placed under fluoroscopic guidance.    Plan:   1. Nothing by mouth for 4 hours.  2. Gastrostomy tube to gravity drainage for 4 hours.

## 2018-08-02 NOTE — PLAN OF CARE
Problem: Patient Care Overview  Goal: Plan of Care/Patient Progress Review  Outcome: No Change  Afebrile. AVSS. PRN sublingual Dilaudid added q3, Fentanyl patch increased to 200 mcg. PCA still available, plan to transition to all PO pain medications tomorrow. NS bolus added to mimic home regimen. G-tube and ileostomy with good output. Diet advanced to clear liquid diet. Pt up ambulating in hallway today. Continue to monitor and follow w/ POC.

## 2018-08-03 NOTE — PROGRESS NOTES
[facility], [place]    Hematology / Oncology Progress Note    Date of Admission: 7/24/2018  Hospital Day #: 10   Date of Service (when I saw the patient): 08/03/2018     Assessment & Plan   Sebas Lopez is a 53 year old male with PMHx significant for ulcerative colitis and sigmoid adenocarcinoma with peritoneal carcinomatosis s/p ileostomy 7/2017 and colonic stent 3/19 (with most recent revision 7/5) who presents from clinic with 3-4 day history of nausea/vomiting and increased abdominal pain, found to have a small bowel obstruction on CT scan.    Plan  - continue pain regimen per Palliative Care -Fentanyl increased to 200mcg; 15-20mg dilaudid PO Q3 hrs PRN, keep PCA for breakthrough. He may need PCA at home given his pain needs.   - script sent to pharmacy for dilaudid high concentration- awaiting insurance prior-auth, attempting to prepare for discharge    #Small bowel obstruction. Transition point in LLQ.  #Nausea and vomiting-improved with venting PEG tube  #Increased abdominal pain.   Symptoms started about 3-4 days ago. Multiple emesis per day, notes abdominal pain would improve with emesis. Explains emesis appeared similar to ileostomy output in color/consistency. Ileostomy output seemed to remain the same about of output/mildly decrease. No blood in ileostomy output. Tried to keep up with PO fluid intake but could not. Found to have a SBO with dilated loops of small bowel and transition point in LLQ on CT. Additionally labs demonstrated an PAULINA and electrolyte abnormalities (See below).   -NG to LIS for decompression. NPO (but patient drinking lots of water).  -IVF replacement.  -IV medications as able. Home meds (bowel meds, vitamins, etc) held d/t strict NPO status and NG.   -IV antiemetics to treat sx.  -Medical mgmt at this time.  -Colorectal surgery consulted, ? Any surgical mgmt for SBO. They do not have any intervention to offer and recommend continuing with  conservative/medical mgmt.  - IR placed palliative venting G-tube 8/1.   -Palliative care team consulted, patient follows with outpatient.- continue pain regimen per Palliative Care -Fentanyl increased to 200mcg; 15-20mg dilaudid PO Q3 hrs PRN, keep PCA for breakthrough. If his pain cannot be managed by PO, he may need PCA at home given his pain needs (asking home care if they can provide PCA).     #Hypokalemia/Hyponatremia/Hypochoremia. Resolved.  #Hypermagnesemia/Hyperphosphatemia. Resolved.  #PAULINA. Cr on admission 2.66. Improving, Cr 1.31 on 7/26. Resolved.  Likely 2/2 above nausea/vomiting/SBO leading to dehydration. Skin with some tenting on AM exam 7/25. On TPN support (see below).   -Replace lytes per protocol prn.   -IVF  ml/hr (+TPN volume), now decreased to 50 ml/hr +TPN volume.  -Daily BMP.    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    Follows with Dr. Long outpatient. S/p ex lap 7/2017 with small bowel resection and end ileostomy. S/p 6 cycles of FOLFOX. Admitted on 12/6/2017 for ex-lap with aborted HIPEC due to diffuse carcinomatosis. Repeat CT scan showed worsening peritoneal carcinomatosis. He was admitted to the hospital with worsening colon distention and colon stent was placed which improved his symptoms. He resumed FOLFOX C7 3/8/18 f/b hospitalization for bowel obstruction and stent replacement 3/19/2018. Continued with FOLFOX, last cycle #13 6/15/18. Repeat CT 6/27 with slightly progressed peritoneal carcinomatosis, increased colonic and rectal fluid favoring colitis. Per Dr. Long's note 6/28 next treatment options would likely be irinotecan and panitumumab. Has been unable to begin d/t recent hospitalizations.   -Will need follow up scheduled at discharge.     #Leukocytosis. No e/o infection at this time.  #Anemia.  Has had a mild leukocytosis on follow up labs in clinic. Admission WBC count 16.7-->elevated to 18.7 7/25. Anemia 2/2 chronic illness.   -BCx drawn  7/25, NGTD.  -Afebrile, monitor at this time. Low threshold to start antibiotics if spikes.  -Will monitor with daily CBC.    # Pain Assessment:  Current Pain Score 8/3/2018   Patient currently in pain? yes   Pain score (0-10) -   Pain location Abdomen   Pain descriptors Cramping;Discomfort   - Sebas is experiencing pain due to metastatic sigmoid adenocarcinoma. Pain management was discussed and the plan was created in a collaborative fashion.  Sebas's response to the current recommendations: engaged  - Opioid regimen: Fentanyl patch 37 mcg/72hrs, IV dilaudid 1-1.5 mg q2hrs prn for breakthrough. HELD PTA PO oxycodone d/t NPO status.  - Bowel regimen: defer d/t SBO and diarrhea through ileostomy. Tincture of opium and imodium held d/t NPO status.    FEN:  -IVF @ 50 ml/hr + TPN volume  -PRN lyte replacement  -NPO, continue PTA TPN support, sips and limited popsicles for comfort    #Severe malnutrition in context of acute illness. >2% weight loss in 1 week, moderate subcutaneous fat loss, muscle loss in temporal/facial/jaw/ and thoracic region.   -Continue TPN support, optimize nutritional support.    Prophy/Misc:  -VTE: Restart enoxaparin 40mg q24h   -GI/PUD: IV Protonix    Code status: Full    Disposition: Pending improvement in acute issues, likely another 2-3 days pending pain management.    Follow up: Has scheduled follow up with Dr. Long 8/9.     Patient and plan discussed with staff, Dr. Babcock.    EMERSON Prasad-BC  Hematology/Oncology  #1946      Interval History   Nursing notes reviewed, ongoing abdominal discomfort managed with current regimen including PCA.      Today, Sebas reports having continued pain, mostly throughout his lower abdomen. He feels he has gotten behind in his pain.  He is feeling more distended.  No nausea vomiting today, has g-tube to gravity, considering hooking it up to suction. He continues to ambulate multiple times per day.    Denies HA, CP, SOB, dysuria, edema, and voiding  without difficulty.     Physical Exam   Temp: 97.8  F (36.6  C) Temp src: Oral BP: 141/88 Pulse: 108 Heart Rate: 100 Resp: 20 SpO2: 97 % O2 Device: None (Room air)    Vitals:    08/01/18 0812 08/02/18 0728 08/03/18 0723   Weight: 68.4 kg (150 lb 12.7 oz) 69.3 kg (152 lb 11.2 oz) 70.3 kg (155 lb)     Vital Signs with Ranges  Temp:  [96.9  F (36.1  C)-98.8  F (37.1  C)] 97.8  F (36.6  C)  Pulse:  [105-114] 108  Heart Rate:  [] 100  Resp:  [16-20] 20  BP: (116-141)/(71-88) 141/88  SpO2:  [97 %-99 %] 97 %  I/O last 3 completed shifts:  In: 1380 [I.V.:380; IV Piggyback:1000]  Out: 3250 [Urine:425; Emesis/NG output:2375; Stool:450]    Constitutional: Pleasant male seen sitting up in bed, in NAD. Alert and interactive.  HEENT: NCAT, anicteric sclerae, conjunctiva clear. NG in place.  Respiratory: Non-labored breathing, good air exchange. Lungs are clear to auscultation bilaterally, without wheezing, crackles or rhonchi. No cough noted.   Cardiovascular: Regular rate and rhythm with no murmur, rub or gallop.  GI: intermittent BS. Soft, tender with palpation in lower abdomen. No rebound. Watery green ostomy output in bag, stoma appears normal/pink. New venting G-Tube in left upper abdomen CDI.   Skin: Warm and dry. No rashes or lesions on exposed surfaces.  Musculoskeletal: Extremities grossly normal. No tenderness or edema present.   Neurologic: Alert, oriented to situation, speech normal, answering questions appropriately. Moves all extremities spontaneously. Grossly non-focal.  Neuropsychiatric: Mentation and affect normal/appropriate.  VAD: PICC is c/d/i with no erythema, drainage, or tenderness.    Medications   Current Facility-Administered Medications   Medication     0.9% sodium chloride BOLUS     benzocaine-menthol (CEPACOL) 15-3.6 MG lozenge 1 lozenge     dextrose 10 % 1,000 mL infusion     diphenhydrAMINE (BENADRYL) injection 50 mg     enoxaparin (LOVENOX) injection 40 mg     fentaNYL (DURAGESIC) 100 mcg/hr  72 hr patch 1 patch     fentaNYL (DURAGESIC) 100 mcg/hr 72 hr patch 1 patch     fentaNYL (DURAGESIC) Patch in Place     fentaNYL (DURAGESIC) Patch in Place     [START ON 8/5/2018] fentaNYL (DURAGESIC) patch REMOVAL     fentaNYL (DURAGESIC) patch REMOVAL     fentaNYL (PF) (SUBLIMAZE) injection 25-50 mcg     flumazenil (ROMAZICON) injection 0.2 mg     HOLD: Metformin and metformin containing medications if patient received IV contrast     HYDROmorphone (DILAUDID) PCA 1 mg/mL OPIOID TOLERANT     HYDROmorphone (HIGH CONC) (DILAUDID) oral solution 15-20 mg     lipids (INTRALIPID) 20 % infusion 250 mL     May take regular AM medications except those listed below     Medication Instruction     midazolam (VERSED) injection 0.5-1 mg     naloxone (NARCAN) injection 0.4 mg     ondansetron (ZOFRAN) injection 8 mg     pantoprazole (PROTONIX) 40 mg IV push injection     parenteral nutrition - ADULT compounded formula CYCLE     parenteral nutrition - ADULT compounded formula CYCLE     potassium chloride (KLOR-CON) Packet 20-40 mEq     potassium chloride 10 mEq in 100 mL intermittent infusion with 10 mg lidocaine     potassium chloride 10 mEq in 100 mL sterile water intermittent infusion (premix)     potassium chloride 20 mEq in 50 mL intermittent infusion     potassium chloride SA (K-DUR/KLOR-CON M) CR tablet 20-40 mEq     prochlorperazine (COMPAZINE) tablet 10 mg    Or     prochlorperazine (COMPAZINE) injection 10 mg     sodium chloride (PF) 0.9% PF flush 3 mL       Data   CBC    Recent Labs  Lab 08/03/18  0534 08/02/18  0434 08/01/18  0543 07/31/18  2359   WBC 11.2* 12.7* 16.6* 17.9*   RBC 3.07* 3.07* 3.19* 3.18*   HGB 8.4* 8.3* 8.7* 8.8*   HCT 26.9* 27.1* 27.9* 27.8*   MCV 88 88 88 87   MCH 27.4 27.0 27.3 27.7   MCHC 31.2* 30.6* 31.2* 31.7   RDW 18.0* 18.1* 17.9* 18.0*    354 435 418     CMP    Recent Labs  Lab 08/03/18  0534 08/02/18  0434 08/01/18  0543 07/31/18  0557 07/30/18  0548    138 138 138 138   POTASSIUM  4.7 4.3 4.5 4.5 4.4   CHLORIDE 107 107 105 104 104   CO2 24 26 26 27 26   ANIONGAP 6 5 7 7 8   * 123* 162* 141* 160*   BUN 24 28 30 32* 36*   CR 0.61* 0.73 0.73 0.76 0.86   GFRESTIMATED >90 >90 >90 >90 >90   GFRESTBLACK >90 >90 >90 >90 >90   ANNAMARIE 8.2* 8.4* 8.5 8.6 8.7   MAG 1.8 1.9 1.8 1.9 1.9   PHOS 3.4 3.4 3.0 3.3 3.6   PROTTOTAL  --   --   --   --  7.3   ALBUMIN  --   --   --   --  2.2*   BILITOTAL  --   --   --   --  0.2   ALKPHOS  --   --   --   --  131   AST  --   --   --   --  20   ALT  --   --   --   --  15     INR    Recent Labs  Lab 07/31/18  2359 07/31/18  0557 07/30/18  0548   INR 1.06 1.07 1.04       Results for orders placed or performed during the hospital encounter of 07/24/18   XR Abdomen Port 1 View    Narrative    XR ABDOMEN PORT 1 VW  7/24/2018 10:11 PM      HISTORY: ng tube placement;     COMPARISON: CT earlier same day    FINDINGS: Gastric tube is looped in the distal esophagus. Gaseous  distention of the stomach. No pleural effusion. No confluent pulmonary  opacities within the lung bases.      Impression    IMPRESSION: Gastric tube coiled within the distal esophagus which is  repositioned on the subsequent radiograph.    I have personally reviewed the examination and initial interpretation  and I agree with the findings.    KARMEN ODONNELL MD   XR Abdomen Port 1 View    Narrative    Single view of the abdomen  7/24/2018 11:34 PM      HISTORY: NG tube placement    COMPARISON: Radiograph earlier same day    FINDINGS: Gastric tube is repositioned, tip projecting over the  thoracic fundus. Gaseous distention of the stomach, decreased since  prior. No pleural effusion. No confluent pulmonary opacities within  the lung bases.      Impression    IMPRESSION: Gastric tube repositioning, in proper position over the  fundal/body region.    I have personally reviewed the examination and initial interpretation  and I agree with the findings.    KARMEN ODONNELL MD   IR Gastrostomy Tube Percutaneous  Plcmnt    Narrative    Procedure 8/1/2018.  Gastrostomy tube placement under fluoroscopic guidance.    History: Past medical history of ulcerative colitis status post  ileostomy, sigmoid adenocarcinoma with peritoneal carcinomatosis,  status post colonic stents for recurrent bowel obstructions.  Percutaneous gastrostomy tube requested for decompression.    Comparison: CT abdomen pelvis 7/24/2018    Staff: Wilson Jose MD    Fellow: Jericho Guzman MD    Procedure performed by Dr. Guzman under my supervision. I, Dr. Wilson Jose, was present for the entire procedure.    Monitoring: Patient was placed on continuous monitoring supervised by  the IR nursing staff and IR attending. Patient remained stable  throughout the procedure.    Medications:  1. Fentanyl 100 mcg IV  2. Versed 2 mg IV  3. 1% lidocaine for local anesthesia  4. Glucagon 1 mg IV    Face to face sedation time: 30 minutes    Fluoroscopy time: 4.2 minutes    Procedure/Findings: The patient understood the limitations,  alternatives, and risks of the procedure and requested the procedure  be performed. Both written and oral consent were obtained.    Nasogastric tube placed under fluoroscopic guidance. The left upper  quadrant was prepped and draped in the usual sterile fashion.  Intravenous glucagon was administered. The liver margins were  delineated with ultrasound and marked. Stomach inflated with air  through the nasogastric tube.     1% lidocaine was used for local anesthesia. Under fluoroscopic  guidance, gastropexy was made with two PredicSis Saf-T-Pexy  T-fasteners. T fasteners secured. Needle gastrostomy made under  fluoroscopic guidance. Needle removed over guidewire. Track dilated  with the telescopic dilator and 22 [place] peel-away sheath advanced  into the stomach over guidewire. 18 [place] PredicSis KAPIL  gastrostomy tube  advanced over the guidewire through the peel-away  sheath into the stomach under fluoroscopic  guidance. Gastrostomy tube  inflated and peel-away sheath removed. Position documented with  contrast, guidewire removed, and tube secured. Tube flushed with  saline. Sterile dressing applied. Gastrostomy tube placed to gravity  drainage. Nasogastric tube removed. No immediate complication.    Estimate blood loss: less then 1 cc.      Impression    Impression: Uncomplicated 18 [place] [facility] gastrostomy  tube  placed under fluoroscopic guidance.    Plan:   1. Nothing by mouth for 4 hours.  2. Gastrostomy tube to gravity drainage for 4 hours.    I have personally reviewed the examination and initial interpretation  and I agree with the findings.    LAQUITA MCBRIDE

## 2018-08-03 NOTE — PROGRESS NOTES
Care Coordinator - Discharge Planning    Admission Date/Time:  7/24/2018  Attending MD:  Kaden Hardin, *     Data  Date of initial CC assessment:  7/26  Chart reviewed, discussed with interdisciplinary team.   Patient was admitted for:   1. Colon adenocarcinoma (H)    2. Small bowel obstruction    3. Peritoneal carcinomatosis (H)    4. Neoplasm related pain         Assessment   Full assessment completed in previous note. Per Heme/Onc team, working on pain control regimen. Patient presently on dilaudid PCA and SL high concentration dilaudid. Request to RNCC to investigate if home PCA would be a viable option for patient.    Coordination of Care and Referrals: Patient is on-service with [facility] for home TPN. Benefit check initiated to check coverage on home PCA. Awaiting call back.    Met with patient to review plan of care. Since initial RNCC assessment, patient now s/p venting G tube placement. G tube remains to gravity. Discussed suction machine with Heme/Onc and patient. At this time patient plans to keep to gravity and does not need home suction machine ordered.      Plan  Anticipated Discharge Date: TBD- likely here through weekend  Anticipated Discharge Plan: home    Karime Junior  7D Heme/Onc RN Care Coordinator  Pager [phone]

## 2018-08-03 NOTE — PLAN OF CARE
"Problem: Patient Care Overview  Goal: Plan of Care/Patient Progress Review  /85 (BP Location: Left arm)  Pulse 105  Temp 98.4  F (36.9  C) (Oral)  Resp 18  Ht 1.753 m (5' 9\")  Wt 69.3 kg (152 lb 11.2 oz)  SpO2 97%  BMI 22.55 kg/m2     A&Ox4, afebrile, tachycardic, AOVSS on RA. Up ad juan. Denies dyspnea. Per Palliative Care pain regimen, pain to lower abdomen managed w/ Fentanyl patch 200mcg on R deltoid and SL high-dose dilaudid q3h PRN, keeping PCA dilaudid 0.6mg q10min for breakthrough pain. Pt still used PCA 1-2 times per hour throughout shift despite q3h dosing of SL dilaudud (per MD notes, discharge plan may include PCA use at home to manage pain adequately). Nausea managed with PRN Zofran x 1, pt reported relief. 2-lumen RPICC infusing cycled TPN and lipids, 2nd lumen infusing TKO between meds. Pt reports abdominal pain is much worse during TPN administration, and that abdomen becomes progressively firmer during infusion. Venting G-tube to gravity w/ large, thin, brown output. Ileostomy bag changed by pt, mild erythema at ruslan-stomal site but skin intact, stoma powder and skin protectant applied along with protective ring, small green liquid output. Adequate UOP. Will continue to monitor and follow POC.      "

## 2018-08-03 NOTE — PLAN OF CARE
Problem: Patient Care Overview  Goal: Plan of Care/Patient Progress Review  Outcome: No Change  Slightly tachy this morning and afternoon. OVSS. Pt was comfortable throughout the shift, stated pain was minimal. Still receiving PRN Dilaudid every 3 hours, as well as the PCA pump. Fentanyl patches located on right arm. Ileostomy and G-tube with good output. Pt was up ambulating in the halls today. Continue to monitor and follow w/ POC.

## 2018-08-03 NOTE — PROGRESS NOTES
"[facility], [place]  Palliative Care Progress Note    Patient: Sebas Lopez  Date of Admission:  7/24/2018    Recommendations:  -Discontinue Hydromorphone SL  -Continue Fentanyl 200 mcg/hr patch  -Continue Hydromorphone PCA 0.6 mg q10min     Next steps:   -consider increasing fentanyl patch on Sunday, with the goal of approximately 50% of daily opioid needs coming from long-acting agent   -plan for discharge home with PCA     Assessment  Sebas Lopez is a 53 year old male with history of ulcerative colitis now with peritoneal carcinomatosis from sigmoid adenocarcinoma s/p ileostomy in 7/17 and colonic stent placement 3/19. He presented with increased abdominal pain, nausea, and vomiting secondary to SBO on 7/24. Did not resolve with medical management, so a venting g-tube was placed on 8/1.      Palliative care is following for pain management.     Symptoms:   Pain--severe abdominal pain likely 2/2 disease progression causing obstruction. Pain control is moderately relieved by Fentanyl patch (increased 8/2), PCA and SL dilaudid. \"Can't imagine\" what the pain would be like without the PCA, as he relies on it frequently between doses of prn SL dilaudid. Reported intermittent abdominal cramps, which caused him to be quite restless--g tube, repositioning and pca help.     Pain management was discussed with Sebas and heme/onc. The plan was created in a collaborative fashion, and the patient's response to the current recommendations is: engaged and agreeable.       These recommendations have been discussed with Laure Gr.    ALEXA Corbin CNP  Palliative Care Consult Team  Pager: [phone]    [facility] Inpatient Team Consult pager [phone] (M-F 8-4:30)  After-hours Answering Service [phone]   Palliative Clinic: [phone]     40 minutes spent with patient, with >50% counseling and in care coordination.     Interval History:   Venting Gastric tube placed 8/1; open to " gravity and draining. Ongoing pain control issues, currently requiring PCA in addition to prn SL dilaudid.          Medications:   I have reviewed this patient's medication profile and medications during this hospitalization.    TPN/Lipids    Fentanyl 200 mcg/hr patch   Hydromorphone PCA 0.6 mg [id number]--[id number] 29.5 mg yesterday  Hydromorphone 15-20 mg SL q3h prn--used 120 mg yesterday         Review of Systems:   A comprehensive ROS has been negative other than stated in the HPI and below:   Palliative Symptom Review (0=no symptom/no concern, 1=mild, 2=moderate, 3=severe):  Pain: 2  Fatigue: 2  Nausea: 1  Constipation: 0        Physical Exam:   Vital Signs: Temp: 97.8  F (36.6  C) Temp src: Oral BP: 141/88 Pulse: 108 Heart Rate: 100 Resp: 20 SpO2: 97 % O2 Device: None (Room air)    Weight: 155 lbs 0 oz    Constitutional: Pleasant male, seen in hospital bed, in mild distress related to abdominal cramping.   Head: Normocephalic. MMM.   Extremities: No edema.  Pulm: Non-labored breathing, on room air. Speaking in complete sentences.    Musculoskeletal: CONNOLLY. No obvious malformations.   Skin: No jaundice. No concerning rashes or lesions on exposed areas.   Neuro: A/O x 4. Face symmetrical. PERRL. EOMI.   Psych: Speech clear. Appears anxious. Memory and insight intact.      Data Reviewed:     CMP    Recent Labs  Lab 08/03/18  0534 08/02/18  0434 08/01/18  0543 07/31/18  0557 07/30/18  0548    138 138 138 138   POTASSIUM 4.7 4.3 4.5 4.5 4.4   CHLORIDE 107 107 105 104 104   CO2 24 26 26 27 26   ANIONGAP 6 5 7 7 8   * 123* 162* 141* 160*   BUN 24 28 30 32* 36*   CR 0.61* 0.73 0.73 0.76 0.86   GFRESTIMATED >90 >90 >90 >90 >90   GFRESTBLACK >90 >90 >90 >90 >90   ANNAMARIE 8.2* 8.4* 8.5 8.6 8.7   MAG 1.8 1.9 1.8 1.9 1.9   PHOS 3.4 3.4 3.0 3.3 3.6   PROTTOTAL  --   --   --   --  7.3   ALBUMIN  --   --   --   --  2.2*   BILITOTAL  --   --   --   --  0.2   ALKPHOS  --   --   --   --  131   AST  --   --   --   --  20   ALT  --    --   --   --  15     CBC    Recent Labs  Lab 08/03/18  0534 08/02/18  0434 08/01/18  0543 07/31/18  2359   WBC 11.2* 12.7* 16.6* 17.9*   RBC 3.07* 3.07* 3.19* 3.18*   HGB 8.4* 8.3* 8.7* 8.8*   HCT 26.9* 27.1* 27.9* 27.8*   MCV 88 88 88 87   MCH 27.4 27.0 27.3 27.7   MCHC 31.2* 30.6* 31.2* 31.7   RDW 18.0* 18.1* 17.9* 18.0*    354 435 418     INR    Recent Labs  Lab 07/31/18  2359 07/31/18  0557 07/30/18  0548   INR 1.06 1.07 1.04

## 2018-08-04 NOTE — PLAN OF CARE
Problem: Patient Care Overview  Goal: Plan of Care/Patient Progress Review  Patient anxious and restless at beginning of shift,said abdominal pain Is getting worse and pain  not well controlled with pca dilaudid bumps current dose,.Provider assessed pt,PCA dilaudid bumps increased to 0.9mg every 10 minutes,fentanyl patch dose was also increased,pt started on daily decadron and abdominal x-ray done,see result flow sheet.Pt received prn compazine for nausea with relief..G-tube to gravity,greenish output and ileostomy patent with dark green output.Patient reported pain is well controlled with current dose of pca dilaudid,resting well at this time...Continue per plan of care.

## 2018-08-04 NOTE — PROGRESS NOTES
[facility], [place]    Hematology / Oncology Progress Note    Date of Admission: 7/24/2018  Hospital Day #: 11   Date of Service (when I saw the patient): 08/04/2018     Assessment & Plan   Sebas Lopez is a 53 year old male with PMHx significant for ulcerative colitis and sigmoid adenocarcinoma with peritoneal carcinomatosis s/p ileostomy 7/2017 and colonic stent 3/19 (with most recent revision 7/5) who presents from clinic with 3-4 day history of nausea/vomiting and increased abdominal pain, found to have a small bowel obstruction on CT scan.    Plan  - Unfortunately PO high concentration dilaudid volumes would be too significant to support at home; discussed pain regimen with Palliative Care (Dr. Mahmood) - increase Fentanyl 300mcg; and increase breakthrough to 0.9mg dilaudid ever 10min PRN   - Add dexamethasone 8mg daily x 3 days, then 4mg daily thereafter  - KUB today given reported increased lower abdominal pain (pending).   - lasix 20mg to see if it helps as he feels he is retaining fluid  - Continue venting G-tube    #Small bowel obstruction. Transition point in LLQ.  #Nausea and vomiting-improved with venting PEG tube  #Increased abdominal pain.   Symptoms started about 3-4 days ago. Multiple emesis per day, notes abdominal pain would improve with emesis. Explains emesis appeared similar to ileostomy output in color/consistency. Ileostomy output seemed to remain the same about of output/mildly decrease. No blood in ileostomy output. Tried to keep up with PO fluid intake but could not. Found to have a SBO with dilated loops of small bowel and transition point in LLQ on CT. Additionally labs demonstrated an PAULINA and electrolyte abnormalities (See below).   -NG to LIS for decompression. NPO (but patient drinking lots of water).  -IVF replacement.  -IV medications as able. Home meds (bowel meds, vitamins, etc) held d/t strict NPO status and NG.   -IV antiemetics to treat  sx.  -Medical mgmt at this time.  -Colorectal surgery consulted, ? Any surgical mgmt for SBO. They do not have any intervention to offer and recommend continuing with conservative/medical mgmt.  - IR placed palliative venting G-tube 8/1.   -Palliative care team consulted, patient follows with outpatient. Unfortunately PO high concentration dilaudid volumes would be too significant to support at home; discussed pain regimen with Palliative Care (Dr. Mahmood) - increase Fentanyl 300mcg; and increase breakthrough to 0.9mg dilaudid ever 10min PRN   - Add dexamethasone 8mg daily x 3 days, then 4mg daily thereafter  - KUB today given reported increased lower abdominal pain (pending).   - lasix 20mg to see if it helps as he feels he is retaining fluid    #Hypokalemia/Hyponatremia/Hypochoremia. Resolved.  #Hypermagnesemia/Hyperphosphatemia. Resolved.  #PAULINA. Cr on admission 2.66. Improving, Cr 1.31 on 7/26. Resolved.  Likely 2/2 above nausea/vomiting/SBO leading to dehydration. Skin with some tenting on AM exam 7/25. On TPN support (see below).   -Replace lytes per protocol prn.   -IVF  ml/hr (+TPN volume), now decreased to 50 ml/hr +TPN volume.  -Daily BMP.    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    Follows with Dr. Long outpatient. S/p ex lap 7/2017 with small bowel resection and end ileostomy. S/p 6 cycles of FOLFOX. Admitted on 12/6/2017 for ex-lap with aborted HIPEC due to diffuse carcinomatosis. Repeat CT scan showed worsening peritoneal carcinomatosis. He was admitted to the hospital with worsening colon distention and colon stent was placed which improved his symptoms. He resumed FOLFOX C7 3/8/18 f/b hospitalization for bowel obstruction and stent replacement 3/19/2018. Continued with FOLFOX, last cycle #13 6/15/18. Repeat CT 6/27 with slightly progressed peritoneal carcinomatosis, increased colonic and rectal fluid favoring colitis. Per Dr. Long's note 6/28 next treatment  options would likely be irinotecan and panitumumab. Has been unable to begin d/t recent hospitalizations.   -Follow up with Dr. Long scheduled 8/9    #Leukocytosis. No e/o infection at this time.  #Anemia.  Has had a mild leukocytosis on follow up labs in clinic. Admission WBC count 16.7-->elevated to 18.7 7/25. Anemia 2/2 chronic illness.   -BCx drawn 7/25, NGTD.  -Afebrile, monitor at this time. Low threshold to start antibiotics if spikes.  -Will monitor with daily CBC.    # Pain Assessment:  Current Pain Score 8/4/2018   Patient currently in pain? yes   Pain score (0-10) -   Pain location Abdomen   Pain descriptors Aching   - Sebas is experiencing pain due to metastatic sigmoid adenocarcinoma. Pain management was discussed and the plan was created in a collaborative fashion.  Sebas's response to the current recommendations: engaged  - Opioid regimen: Fentanyl patch 37 mcg/72hrs, IV dilaudid 1-1.5 mg q2hrs prn for breakthrough. HELD PTA PO oxycodone d/t NPO status.  - Bowel regimen: defer d/t SBO and diarrhea through ileostomy. Tincture of opium and imodium held d/t NPO status.    FEN:  -IVF @ 50 ml/hr + TPN volume  -PRN lyte replacement  -NPO, continue PTA TPN support, sips and limited popsicles for comfort    #Severe malnutrition in context of acute illness. >2% weight loss in 1 week, moderate subcutaneous fat loss, muscle loss in temporal/facial/jaw/ and thoracic region.   -Continue TPN support, optimize nutritional support.    Prophy/Misc:  -VTE: Restart enoxaparin 40mg q24h   -GI/PUD: IV Protonix    Code status: Full    Disposition: Pending improvement in acute issues, likely another 2-3 days pending pain management.    Follow up: Has scheduled follow up with Dr. Long 8/9.  Requested follow up with Dr. Trujillo with plan for him to manage home PCA.      Patient and plan discussed with staff, Dr. Babcock.    Laure Gr, EMERSON-BC  Hematology/Oncology  #[id number]      Interval History   Nursing notes reviewed, ongoing  abdominal discomfort managed with current regimen including PCA.      Today, Sebas reports his pain is still out of control, 5/10, he is uncomfortable in bed. He feels like he is retaining fluid and that his abdomen is swollen with fluid. He has continued pain, mostly throughout his lower abdomen. No nausea vomiting today, has g-tube to gravity, considering hooking it up to suction. He continues to ambulate multiple times per day.    Denies HA, CP, SOB, dysuria, edema, and voiding without difficulty.     Physical Exam   Temp: 96.3  F (35.7  C) Temp src: Oral BP: (!) 134/91 Pulse: 108 Heart Rate: 97 Resp: 19 SpO2: 98 % O2 Device: Nasal cannula with humidification    Vitals:    08/01/18 0812 08/02/18 0728 08/03/18 0723   Weight: 68.4 kg (150 lb 12.7 oz) 69.3 kg (152 lb 11.2 oz) 70.3 kg (155 lb)     Vital Signs with Ranges  Temp:  [95.8  F (35.4  C)-97.8  F (36.6  C)] 96.3  F (35.7  C)  Pulse:  [108-114] 108  Heart Rate:  [] 97  Resp:  [16-20] 19  BP: (116-141)/(74-96) 134/91  SpO2:  [96 %-98 %] 98 %  I/O last 3 completed shifts:  In: 3335.7 [P.O.:220; I.V.:20; IV Piggyback:1000]  Out: 2175 [Urine:700; Emesis/NG output:1375; Stool:100]    Constitutional: Pleasant male seen sitting up in bed, in NAD. Alert and interactive.  HEENT: NCAT, anicteric sclerae, conjunctiva clear. NG in place.  Respiratory: Non-labored breathing, good air exchange. Lungs are clear to auscultation bilaterally, without wheezing, crackles or rhonchi. No cough noted.   Cardiovascular: Regular rate and rhythm with no murmur, rub or gallop.  GI: intermittent BS. Soft, tender with palpation in lower abdomen. No rebound. Watery green ostomy output in bag, stoma appears normal/pink. New venting G-Tube in left upper abdomen CDI.   Skin: Warm and dry. No rashes or lesions on exposed surfaces.  Musculoskeletal: Extremities grossly normal. No tenderness or edema present.   Neurologic: Alert, oriented to situation, speech normal, answering questions  appropriately. Moves all extremities spontaneously. Grossly non-focal.  Neuropsychiatric: Mentation and affect normal/appropriate.  VAD: PICC is c/d/i with no erythema, drainage, or tenderness.    Medications   Current Facility-Administered Medications   Medication     0.9% sodium chloride BOLUS     benzocaine-menthol (CEPACOL) 15-3.6 MG lozenge 1 lozenge     dextrose 10 % 1,000 mL infusion     diphenhydrAMINE (BENADRYL) injection 50 mg     enoxaparin (LOVENOX) injection 40 mg     fentaNYL (DURAGESIC) 100 mcg/hr 72 hr patch 1 patch     fentaNYL (DURAGESIC) 100 mcg/hr 72 hr patch 1 patch     fentaNYL (DURAGESIC) Patch in Place     fentaNYL (DURAGESIC) Patch in Place     [START ON 8/5/2018] fentaNYL (DURAGESIC) patch REMOVAL     fentaNYL (DURAGESIC) patch REMOVAL     fentaNYL (PF) (SUBLIMAZE) injection 25-50 mcg     flumazenil (ROMAZICON) injection 0.2 mg     HYDROmorphone (DILAUDID) PCA 1 mg/mL OPIOID TOLERANT     lipids (INTRALIPID) 20 % infusion 250 mL     May take regular AM medications except those listed below     Medication Instruction     midazolam (VERSED) injection 0.5-1 mg     naloxone (NARCAN) injection 0.4 mg     ondansetron (ZOFRAN) injection 8 mg     pantoprazole (PROTONIX) 40 mg IV push injection     parenteral nutrition - ADULT compounded formula CYCLE     potassium chloride (KLOR-CON) Packet 20-40 mEq     potassium chloride 10 mEq in 100 mL intermittent infusion with 10 mg lidocaine     potassium chloride 10 mEq in 100 mL sterile water intermittent infusion (premix)     potassium chloride 20 mEq in 50 mL intermittent infusion     potassium chloride SA (K-DUR/KLOR-CON M) CR tablet 20-40 mEq     prochlorperazine (COMPAZINE) tablet 10 mg    Or     prochlorperazine (COMPAZINE) injection 10 mg     sodium chloride (PF) 0.9% PF flush 3 mL       Data   CBC    Recent Labs  Lab 08/04/18  0551 08/03/18  0534 08/02/18  0434 08/01/18  0543   WBC 12.6* 11.2* 12.7* 16.6*   RBC 3.04* 3.07* 3.07* 3.19*   HGB 8.2*  8.4* 8.3* 8.7*   HCT 26.7* 26.9* 27.1* 27.9*   MCV 88 88 88 88   MCH 27.0 27.4 27.0 27.3   MCHC 30.7* 31.2* 30.6* 31.2*   RDW 17.9* 18.0* 18.1* 17.9*    383 354 435     CMP    Recent Labs  Lab 08/04/18  0551 08/03/18  0534 08/02/18  0434 08/01/18  0543 07/31/18  0557 07/30/18  0548    138 138 138 138 138   POTASSIUM 4.6 4.7 4.3 4.5 4.5 4.4   CHLORIDE 107 107 107 105 104 104   CO2 25 24 26 26 27 26   ANIONGAP 6 6 5 7 7 8   * 113* 123* 162* 141* 160*   BUN 24 24 28 30 32* 36*   CR 0.62* 0.61* 0.73 0.73 0.76 0.86   GFRESTIMATED >90 >90 >90 >90 >90 >90   GFRESTBLACK >90 >90 >90 >90 >90 >90   ANNAMARIE 8.6 8.2* 8.4* 8.5 8.6 8.7   MAG  --  1.8 1.9 1.8 1.9 1.9   PHOS  --  3.4 3.4 3.0 3.3 3.6   PROTTOTAL  --   --   --   --   --  7.3   ALBUMIN  --   --   --   --   --  2.2*   BILITOTAL  --   --   --   --   --  0.2   ALKPHOS  --   --   --   --   --  131   AST  --   --   --   --   --  20   ALT  --   --   --   --   --  15     INR    Recent Labs  Lab 07/31/18  2359 07/31/18  0557 07/30/18  0548   INR 1.06 1.07 1.04       Results for orders placed or performed during the hospital encounter of 07/24/18   XR Abdomen Port 1 View    Narrative    XR ABDOMEN PORT 1 VW  7/24/2018 10:11 PM      HISTORY: ng tube placement;     COMPARISON: CT earlier same day    FINDINGS: Gastric tube is looped in the distal esophagus. Gaseous  distention of the stomach. No pleural effusion. No confluent pulmonary  opacities within the lung bases.      Impression    IMPRESSION: Gastric tube coiled within the distal esophagus which is  repositioned on the subsequent radiograph.    I have personally reviewed the examination and initial interpretation  and I agree with the findings.    KARMEN ODONNELL MD   XR Abdomen Port 1 View    Narrative    Single view of the abdomen  7/24/2018 11:34 PM      HISTORY: NG tube placement    COMPARISON: Radiograph earlier same day    FINDINGS: Gastric tube is repositioned, tip projecting over the  thoracic  fundus. Gaseous distention of the stomach, decreased since  prior. No pleural effusion. No confluent pulmonary opacities within  the lung bases.      Impression    IMPRESSION: Gastric tube repositioning, in proper position over the  fundal/body region.    I have personally reviewed the examination and initial interpretation  and I agree with the findings.    KARMEN ODONNELL MD   IR Gastrostomy Tube Percutaneous Plcmnt    Narrative    Procedure 8/1/2018.  Gastrostomy tube placement under fluoroscopic guidance.    History: Past medical history of ulcerative colitis status post  ileostomy, sigmoid adenocarcinoma with peritoneal carcinomatosis,  status post colonic stents for recurrent bowel obstructions.  Percutaneous gastrostomy tube requested for decompression.    Comparison: CT abdomen pelvis 7/24/2018    Staff: Wilson Jose MD    Fellow: Jericho Guzman MD    Procedure performed by Dr. Guzman under my supervision. I, Dr. Wilson Jose, was present for the entire procedure.    Monitoring: Patient was placed on continuous monitoring supervised by  the IR nursing staff and IR attending. Patient remained stable  throughout the procedure.    Medications:  1. Fentanyl 100 mcg IV  2. Versed 2 mg IV  3. 1% lidocaine for local anesthesia  4. Glucagon 1 mg IV    Face to face sedation time: 30 minutes    Fluoroscopy time: 4.2 minutes    Procedure/Findings: The patient understood the limitations,  alternatives, and risks of the procedure and requested the procedure  be performed. Both written and oral consent were obtained.    Nasogastric tube placed under fluoroscopic guidance. The left upper  quadrant was prepped and draped in the usual sterile fashion.  Intravenous glucagon was administered. The liver margins were  delineated with ultrasound and marked. Stomach inflated with air  through the nasogastric tube.     1% lidocaine was used for local anesthesia. Under fluoroscopic  guidance, gastropexy was made with  two [facility] Saf-T-Pexy  T-fasteners. T fasteners secured. Needle gastrostomy made under  fluoroscopic guidance. Needle removed over guidewire. Track dilated  with the telescopic dilator and 22 [place] peel-away sheath advanced  into the stomach over guidewire. 18 [place] [facility]  gastrostomy tube  advanced over the guidewire through the peel-away  sheath into the stomach under fluoroscopic guidance. Gastrostomy tube  inflated and peel-away sheath removed. Position documented with  contrast, guidewire removed, and tube secured. Tube flushed with  saline. Sterile dressing applied. Gastrostomy tube placed to gravity  drainage. Nasogastric tube removed. No immediate complication.    Estimate blood loss: less then 1 cc.      Impression    Impression: Uncomplicated 18 [place] [facility] gastrostomy  tube  placed under fluoroscopic guidance.    Plan:   1. Nothing by mouth for 4 hours.  2. Gastrostomy tube to gravity drainage for 4 hours.    I have personally reviewed the examination and initial interpretation  and I agree with the findings.    LAQUITA MCBRIDE

## 2018-08-04 NOTE — PROGRESS NOTES
Care Coordinator - Discharge Planning     Admission Date/Time:  7/24/2018  Attending MD:  Kaden Hardin, *     Data  Date of initial CC assessment:  7/26  Chart reviewed, discussed with interdisciplinary team.   Patient was admitted for:   1. Colon adenocarcinoma (H)    2. Small bowel obstruction    3. Peritoneal carcinomatosis (H)    4. Neoplasm related pain          Assessment   Full assessment completed in previous note. Per Heme/Onc team, working on pain control regimen. Patient presently on dilaudid PCA and SL high concentration dilaudid. Request to RNCC to investigate if home PCA would be a viable option for patient.     Coordination of Care and Referrals: Patient is on-service with [facility] for home TPN. Benefit check initiated to check coverage on home PCA. Awaiting call back.     Met with patient to review plan of care. Since initial RNCC assessment, patient now s/p venting G tube placement. G tube remains to gravity. Discussed suction machine with Heme/Onc and patient. At this time patient plans to keep to gravity and does not need home suction machine ordered.      Update 8/4 9:30 a.m.:  Notified by MIHCAEL Kelsey Hem/Onc that pt may be discharged home this weekend and will discharge on home TPN and a dilaudid PCA.   Spoke with Linda kaye who confirmed that pt does have home coverage for home TPN and dilaudid PCA.   Text paged omar with update.  Will continue to monitor.    1200:  Notified by MICHAEL Kelsey Hem/Onc that discharge early next week d/t on going pain management needs.  Will defer to primary RNCC for further management.    Plan  Anticipated Discharge Date: TBD- likely here through weekend  Anticipated Discharge Plan: home    Amy Valdovinos RN BSN, PHSHERRIE Salter RN Care Coordinator   [email or web address]  Pager [phone]  8/4/2018 9:41 AM

## 2018-08-04 NOTE — PROGRESS NOTES
On-call Palliative Care note:     Contacted by primary team (Laure Gr) with concerns for ongoing pain for Sebas.  History obtained by phone and chart as we are not in-house today.  Sebas has been followed by our inpatient team for pain management in the setting of metastatic colorectal cancer and small bowel obstruction.  He has been on escalating doses of a fentanyl patch (200mcg, dose increased 8/2), also hydromorphone PCA 0.6mg PCA with 10 minute lockout.  No sedation noted.     Hydromorphone in past 24h - 10.8mg + 16.8mg + 11mg ~40mg in past 24h  Cr 0.62  Last ALT/AST normal    Recs:   -If no contra-indications, consider the addition of dexamethasone 8mg daily x 3 days followed by 4mg daily x 3 days, further doses to be determined based on response  -Based on hydromorphone use in past 24h, consider increasing fentanyl to 300mcg  -recommend liberalizing PCA dose to 0.9mg (50% increase for only modest pain control), lockout of 10 minutes    I will plan to see Sebas tomorrow for a pain follow up when I am back in-house.     Until then please feel free to page with any questions.     Tarah Rome MD  Palliative Medicine on-call  Non-face to face start time: 1050  Non-face to face end time: 1120

## 2018-08-04 NOTE — PLAN OF CARE
Problem: Patient Care Overview  Goal: Plan of Care/Patient Progress Review  Outcome: Improving  AVSS. Afebrile. Denies SOB, nausea. Lower abdominal pain relieved with PCA and fentanyl patch. Tolerating clear liquid diet. Voiding adequately/spontaneously. Ileostomy with small output. G-tube to gravity with green/brown output. Up independently. Anticipate discharge Monday. Continue monitoring and with POC.

## 2018-08-04 NOTE — PLAN OF CARE
Problem: Patient Care Overview  Goal: Plan of Care/Patient Progress Review  Outcome: No Change  Baseline tachy. Hypertensive when in pain (DBP 90s). Abdominal pain controlled w/ dilaudid PCA pump, 100mcg/hour Fentanyl patches x 2 on R deltoid, & PRN high conc. sublingual dilaudid q 3 hours. A&Ox4. Pt up independently. Clear liquid diet maintained. G-tube open to gravity. TPN (cycling) and lipids running via PICC line. No acute events. Continue POC.

## 2018-08-05 NOTE — PROGRESS NOTES
"[facility], [place]  Palliative Care Progress Note    Patient: Sebas Lopez  Date of Admission:  7/24/2018    Recommendations:  -no new medications recommended        Assessment  Sebas Lopez is a 53 year old male with history of ulcerative colitis now with peritoneal carcinomatosis from sigmoid adenocarcinoma s/p ileostomy in 7/17 and colonic stent placement 3/19. He presented with increased abdominal pain, nausea, and vomiting secondary to SBO on 7/24. Did not resolve with medical management, so a venting g-tube was placed on 8/1.      Palliative care is following for pain management.     Symptoms:   Pain--severe abdominal pain likely 2/2 disease progression causing obstruction. Pain control is moderately relieved by Fentanyl patch (increased 8/4), PCA dilaudid. \"Can't imagine\" what the pain would be like without the PCA, as he relies on it frequently between doses of prn SL dilaudid. Pain is better controlled with most recent fentanyl escalation and other changes made yesterday.  I note that he is now on very high doses of fentanyl and has started to notice some mild clonus while sleeping.  If further escalations are needed, we may have to consider an opioid rotation.     Pain management was discussed with Sebas. The plan was created in a collaborative fashion, and the patient's response to the current recommendations is: engaged and agreeable.    Discussed with bedside RN.     Tarah Rome MD  Palliative Care Consult Team  Pager: [phone]    [facility] Inpatient Team Consult pager [phone] (M-F 8-4:30)  After-hours Answering Service [phone]   Palliative Clinic: [phone]     35 minutes spent today, with >50% counseling and in care coordination. (start time 837, end time 912).  Prolonged face to face time on 8/4, please refer to yesterday's documentation.      Interval History:   See note from yesterday, fentanyl patch increased to 300mcg, hydromorphone PCA dose " "increased and dexamethasone added.  This morning, Sebas reports that his pain control is \"good\" and that he actually was able to sleep last night which was the first time in over a week.  No sedation from the changes.  No side effects from the dexamethasone.  When asked, he does note that he has always had some mild clonus which has occasionally woken him from sleep.  This has increased as his opioids have increased.  It is minimal and currently non-distressing.  He also has a minute or so after pushing his PCA button that his pain increases before improving.          Medications:   I have reviewed this patient's medication profile and medications during this hospitalization.    TPN/Lipids  Dexamethasone 8mg daily x 3 days ordered, first dose 8/4  Fentanyl 300 mcg/hr patch, last escalation 8/4  Hydromorphone PCA 0.9 mg [id number] 15.4+18.9+ 5.4mg yesterday ~40mg again        Review of Systems:   A comprehensive ROS has been negative other than stated in the HPI and below:   Palliative Symptom Review (0=no symptom/no concern, 1=mild, 2=moderate, 3=severe):  Pain: 1  Insomnia: 0  Nausea: 0  Edema: abdominal \"bloating\" improved with the addition of diuretics yesterday        Physical Exam:   Vital Signs: Temp: 97  F (36.1  C) Temp src: Oral BP: 130/82   Heart Rate: 114 Resp: 17 SpO2: 98 % O2 Device: None (Room air)    Weight: 157 lbs 11.2 oz    Constitutional: Pleasant male, seen sitting on window sill in room, comfortable appearing.   Head: MMM.   Extremities: No edema.  Pulm: Non-labored breathing, on room air. Speaking in complete sentences.    Musculoskeletal: CONNOLLY.   Skin: No jaundice. No concerning rashes or lesions on exposed areas.   Neuro: A/O x 4. Face symmetrical. Pupils equal. EOMI.   Psych: Speech clear. Memory and insight intact.      Data Reviewed:   Cr 0.6  WBC 14.8, Hgb 7.9, plts normal    AXR yesterday:   \"1. No gaseous distention to suggest obstruction.  2. Possible fluid-filled loops of bowel due " "to a paucity of gas within  the abdomen and pelvis..\"  "

## 2018-08-05 NOTE — PLAN OF CARE
Problem: Patient Care Overview  Goal: Plan of Care/Patient Progress Review  Outcome: Therapy, progress toward functional goals as expected  Tachy & hypertensive at baseline. AOVSS. Sepsis protocol triggered this afternoon. Afebrile. Resulted lactic = 0.5, no further action taken. Abdominal pain under control w/ dilaudid PCA pump & transdermal fentanyl (300mcg/hr = 100mcg patches x 3 on left deltoid). A&Ox4. No c/o nausea this shift. Poor oral intake, maintain clear liquid diet. Cycling TPN & lipids infusing. PICC caps x 2 changed this shift. Adequate urine & ostomy output. G-tube to gravity with green output. Pt up independently. Continue with POC.

## 2018-08-05 NOTE — PROGRESS NOTES
[facility], [place]    Hematology / Oncology Progress Note    Date of Admission: 7/24/2018  Hospital Day #: 12   Date of Service (when I saw the patient): 08/05/2018     Assessment & Plan   Sebas Lopez is a 53 year old male with PMHx significant for ulcerative colitis and sigmoid adenocarcinoma with peritoneal carcinomatosis s/p ileostomy 7/2017 and colonic stent 3/19 (with most recent revision 7/5) who presents from clinic with 3-4 day history of nausea/vomiting and increased abdominal pain, found to have a small bowel obstruction on CT scan. He had a venting G-tube placed, and has had continued pain management during his admission.     Plan  - Pain significantly improved today  - Continue Fentanyl 300mcg; and increase breakthrough to 0.9mg dilaudid ever 10min PRN   - Add dexamethasone 8mg daily x 3 days, then 4mg daily thereafter  - KUB unremarkable  - discharge medications sent down to start being filled, working with home infusion to arrange for TPN and Dilaudid PCA to be initiated 8/6     #Small bowel obstruction. Transition point in LLQ.  #Nausea and vomiting-improved with venting PEG tube  #Increased abdominal pain.   Symptoms started about 3-4 days ago. Multiple emesis per day, notes abdominal pain would improve with emesis. Explains emesis appeared similar to ileostomy output in color/consistency. Ileostomy output seemed to remain the same about of output/mildly decrease. No blood in ileostomy output. Tried to keep up with PO fluid intake but could not. Found to have a SBO with dilated loops of small bowel and transition point in LLQ on CT. Additionally labs demonstrated an PAULINA and electrolyte abnormalities (See below).   -NG to LIS for decompression. NPO (but patient drinking lots of water).  -IVF replacement.  -IV medications as able. Home meds (bowel meds, vitamins, etc) held d/t strict NPO status and NG.   -IV antiemetics to treat sx.  -Medical mgmt at this  time.  -Colorectal surgery consulted, ? Any surgical mgmt for SBO. They do not have any intervention to offer and recommend continuing with conservative/medical mgmt.  - IR placed palliative venting G-tube 8/1.   -Palliative care team consulted, patient follows with outpatient. Continue Fentanyl 300mcg; and increase breakthrough to 0.9mg dilaudid ever 10min PRN   - Continue dexamethasone 8mg daily x 3 days, then 4mg daily thereafter  - KUB today given reported increased lower abdominal pain (pending).   - lasix 20mg to see if it helps as he feels he is retaining fluid    #Hypokalemia/Hyponatremia/Hypochoremia. Resolved.  #Hypermagnesemia/Hyperphosphatemia. Resolved.  #PAULINA. Cr on admission 2.66. Improving, Cr 1.31 on 7/26. Resolved.  Likely 2/2 above nausea/vomiting/SBO leading to dehydration. Skin with some tenting on AM exam 7/25. On TPN support (see below).   -Replace lytes per protocol prn.   -IVF  ml/hr (+TPN volume), now decreased to 50 ml/hr +TPN volume.  -Daily BMP.    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    Follows with Dr. Long outpatient. S/p ex lap 7/2017 with small bowel resection and end ileostomy. S/p 6 cycles of FOLFOX. Admitted on 12/6/2017 for ex-lap with aborted HIPEC due to diffuse carcinomatosis. Repeat CT scan showed worsening peritoneal carcinomatosis. He was admitted to the hospital with worsening colon distention and colon stent was placed which improved his symptoms. He resumed FOLFOX C7 3/8/18 f/b hospitalization for bowel obstruction and stent replacement 3/19/2018. Continued with FOLFOX, last cycle #13 6/15/18. Repeat CT 6/27 with slightly progressed peritoneal carcinomatosis, increased colonic and rectal fluid favoring colitis. Per Dr. Long's note 6/28 next treatment options would likely be irinotecan and panitumumab. Has been unable to begin d/t recent hospitalizations.   -Follow up with Dr. Long scheduled 8/9    #Leukocytosis. No e/o infection at this  time.  #Anemia.  Has had a mild leukocytosis on follow up labs in clinic. Admission WBC count 16.7-->elevated to 18.7 7/25. Anemia 2/2 chronic illness.   -BCx drawn 7/25, NGTD.  -Afebrile, monitor at this time. Low threshold to start antibiotics if spikes.  -Will monitor with daily CBC.    # Pain Assessment:  Current Pain Score 8/5/2018   Patient currently in pain? yes   Pain score (0-10) -   Pain location Abdomen   Pain descriptors Aching   - Sebas is experiencing pain due to metastatic sigmoid adenocarcinoma. Pain management was discussed and the plan was created in a collaborative fashion.  Sebas's response to the current recommendations: engaged  - Opioid regimen: Fentanyl patch 37 mcg/72hrs, IV dilaudid 1-1.5 mg q2hrs prn for breakthrough. HELD PTA PO oxycodone d/t NPO status.  - Bowel regimen: defer d/t SBO and diarrhea through ileostomy. Tincture of opium and imodium held d/t NPO status.    FEN:  -IVF @ 50 ml/hr + TPN volume  -PRN lyte replacement  -NPO, continue PTA TPN support, sips and limited popsicles for comfort    #Severe malnutrition in context of acute illness. >2% weight loss in 1 week, moderate subcutaneous fat loss, muscle loss in temporal/facial/jaw/ and thoracic region.   -Continue TPN support, optimize nutritional support.    Prophy/Misc:  -VTE: Restart enoxaparin 40mg q24h   -GI/PUD: IV Protonix    Code status: Full    Disposition: Discharge 8/6 with TPN and PCA at home.   Follow up: Follow up with Dr. Long 8/9, appt with Dr. Trujillo requested.     Patient and plan discussed with staff, Dr. Babcock.    Laure Gr, EMERSON-BC  Hematology/Oncology  #[id number]      Interval History   Nursing notes reviewed, ongoing abdominal discomfort managed with current regimen including PCA.      Today, Sebas reports his pain is much better controlled. He reports getting the first good night of sleep in weeks.  He is much happier today. No nausea vomiting today, has g-tube to gravity, considering hooking it up to  suction. He continues to ambulate multiple times per day.    Denies HA, CP, SOB, dysuria, edema, and voiding without difficulty.     Physical Exam   Temp: 98  F (36.7  C) Temp src: Oral BP: 126/87 Pulse: 111 Heart Rate: 114 Resp: 18 SpO2: 97 % O2 Device: None (Room air)    Vitals:    08/03/18 0723 08/04/18 0751 08/05/18 0839   Weight: 70.3 kg (155 lb) 71.5 kg (157 lb 11.2 oz) 71.3 kg (157 lb 3.2 oz)     Vital Signs with Ranges  Temp:  [96.3  F (35.7  C)-98.4  F (36.9  C)] 98  F (36.7  C)  Pulse:  [111-114] 111  Heart Rate:  [] 114  Resp:  [16-20] 18  BP: (114-136)/(79-98) 126/87  SpO2:  [93 %-98 %] 97 %  I/O last 3 completed shifts:  In: 3946.4 [P.O.:440; I.V.:40; IV Piggyback:1000]  Out: 2900 [Urine:1960; Emesis/NG output:670; Stool:270]    Constitutional: Pleasant male seen sitting up in bed, in NAD. Alert and interactive.  HEENT: NCAT, anicteric sclerae, conjunctiva clear. NG in place.  Respiratory: Non-labored breathing, good air exchange. Lungs are clear to auscultation bilaterally, without wheezing, crackles or rhonchi. No cough noted.   Cardiovascular: Regular rate and rhythm with no murmur, rub or gallop.  GI: intermittent BS. Soft, tender with palpation in lower abdomen. No rebound. Watery green ostomy output in bag, stoma appears normal/pink. New venting G-Tube in left upper abdomen CDI.   Skin: Warm and dry. No rashes or lesions on exposed surfaces.  Musculoskeletal: Extremities grossly normal. No tenderness or edema present.   Neurologic: Alert, oriented to situation, speech normal, answering questions appropriately. Moves all extremities spontaneously. Grossly non-focal.  Neuropsychiatric: Mentation and affect normal/appropriate.  VAD: PICC is c/d/i with no erythema, drainage, or tenderness.    Medications   Current Facility-Administered Medications   Medication     0.9% sodium chloride BOLUS     benzocaine-menthol (CEPACOL) 15-3.6 MG lozenge 1 lozenge     [START ON 8/7/2018] dexamethasone  (DECADRON) tablet 4 mg     dexamethasone (DECADRON) tablet 8 mg     dextrose 10 % 1,000 mL infusion     diphenhydrAMINE (BENADRYL) injection 50 mg     enoxaparin (LOVENOX) injection 40 mg     fentaNYL (DURAGESIC) 100 mcg/hr 72 hr patch 3 patch     fentaNYL (DURAGESIC) Patch in Place     [START ON 8/7/2018] fentaNYL (DURAGESIC) patch REMOVAL     fentaNYL (PF) (SUBLIMAZE) injection 25-50 mcg     flumazenil (ROMAZICON) injection 0.2 mg     HYDROmorphone (DILAUDID) PCA 1 mg/mL OPIOID TOLERANT     lipids (INTRALIPID) 20 % infusion 250 mL     May take regular AM medications except those listed below     Medication Instruction     midazolam (VERSED) injection 0.5-1 mg     naloxone (NARCAN) injection 0.4 mg     ondansetron (ZOFRAN) injection 8 mg     pantoprazole (PROTONIX) 40 mg IV push injection     parenteral nutrition - ADULT compounded formula CYCLE     potassium chloride (KLOR-CON) Packet 20-40 mEq     potassium chloride 10 mEq in 100 mL intermittent infusion with 10 mg lidocaine     potassium chloride 10 mEq in 100 mL sterile water intermittent infusion (premix)     potassium chloride 20 mEq in 50 mL intermittent infusion     potassium chloride SA (K-DUR/KLOR-CON M) CR tablet 20-40 mEq     prochlorperazine (COMPAZINE) tablet 10 mg    Or     prochlorperazine (COMPAZINE) injection 10 mg     sodium chloride (PF) 0.9% PF flush 3 mL       Data   CBC    Recent Labs  Lab 08/05/18  0547 08/04/18  0551 08/03/18  0534 08/02/18  0434   WBC 14.8* 12.6* 11.2* 12.7*   RBC 2.91* 3.04* 3.07* 3.07*   HGB 7.9* 8.2* 8.4* 8.3*   HCT 25.0* 26.7* 26.9* 27.1*   MCV 86 88 88 88   MCH 27.1 27.0 27.4 27.0   MCHC 31.6 30.7* 31.2* 30.6*   RDW 17.9* 17.9* 18.0* 18.1*    385 383 354     CMP    Recent Labs  Lab 08/05/18  0547 08/04/18  0551 08/03/18  0534 08/02/18  0434 08/01/18  0543 07/31/18  0557 07/30/18  0548    138 138 138 138 138 138   POTASSIUM 4.4 4.6 4.7 4.3 4.5 4.5 4.4   CHLORIDE 106 107 107 107 105 104 104   CO2 26 25 24  26 26 27 26   ANIONGAP 6 6 6 5 7 7 8   * 148* 113* 123* 162* 141* 160*   BUN 26 24 24 28 30 32* 36*   CR 0.60* 0.62* 0.61* 0.73 0.73 0.76 0.86   GFRESTIMATED >90 >90 >90 >90 >90 >90 >90   GFRESTBLACK >90 >90 >90 >90 >90 >90 >90   ANNAMARIE 8.2* 8.6 8.2* 8.4* 8.5 8.6 8.7   MAG  --   --  1.8 1.9 1.8 1.9 1.9   PHOS  --   --  3.4 3.4 3.0 3.3 3.6   PROTTOTAL  --   --   --   --   --   --  7.3   ALBUMIN  --   --   --   --   --   --  2.2*   BILITOTAL  --   --   --   --   --   --  0.2   ALKPHOS  --   --   --   --   --   --  131   AST  --   --   --   --   --   --  20   ALT  --   --   --   --   --   --  15     INR    Recent Labs  Lab 07/31/18  2359 07/31/18  0557 07/30/18  0548   INR 1.06 1.07 1.04       Results for orders placed or performed during the hospital encounter of 07/24/18   XR Abdomen Port 1 View    Narrative    XR ABDOMEN PORT 1 VW  7/24/2018 10:11 PM      HISTORY: ng tube placement;     COMPARISON: CT earlier same day    FINDINGS: Gastric tube is looped in the distal esophagus. Gaseous  distention of the stomach. No pleural effusion. No confluent pulmonary  opacities within the lung bases.      Impression    IMPRESSION: Gastric tube coiled within the distal esophagus which is  repositioned on the subsequent radiograph.    I have personally reviewed the examination and initial interpretation  and I agree with the findings.    KARMEN ODONNELL MD   XR Abdomen Port 1 View    Narrative    Single view of the abdomen  7/24/2018 11:34 PM      HISTORY: NG tube placement    COMPARISON: Radiograph earlier same day    FINDINGS: Gastric tube is repositioned, tip projecting over the  thoracic fundus. Gaseous distention of the stomach, decreased since  prior. No pleural effusion. No confluent pulmonary opacities within  the lung bases.      Impression    IMPRESSION: Gastric tube repositioning, in proper position over the  fundal/body region.    I have personally reviewed the examination and initial interpretation  and I agree  with the findings.    KARMEN ODONNELL MD   IR Gastrostomy Tube Percutaneous Plcmnt    Narrative    Procedure 8/1/2018.  Gastrostomy tube placement under fluoroscopic guidance.    History: Past medical history of ulcerative colitis status post  ileostomy, sigmoid adenocarcinoma with peritoneal carcinomatosis,  status post colonic stents for recurrent bowel obstructions.  Percutaneous gastrostomy tube requested for decompression.    Comparison: CT abdomen pelvis 7/24/2018    Staff: Wilson Jose MD    Fellow: Jericho Guzman MD    Procedure performed by Dr. Guzman under my supervision. I, Dr. Wilson Jose, was present for the entire procedure.    Monitoring: Patient was placed on continuous monitoring supervised by  the IR nursing staff and IR attending. Patient remained stable  throughout the procedure.    Medications:  1. Fentanyl 100 mcg IV  2. Versed 2 mg IV  3. 1% lidocaine for local anesthesia  4. Glucagon 1 mg IV    Face to face sedation time: 30 minutes    Fluoroscopy time: 4.2 minutes    Procedure/Findings: The patient understood the limitations,  alternatives, and risks of the procedure and requested the procedure  be performed. Both written and oral consent were obtained.    Nasogastric tube placed under fluoroscopic guidance. The left upper  quadrant was prepped and draped in the usual sterile fashion.  Intravenous glucagon was administered. The liver margins were  delineated with ultrasound and marked. Stomach inflated with air  through the nasogastric tube.     1% lidocaine was used for local anesthesia. Under fluoroscopic  guidance, gastropexy was made with two [facility]  T-fasteners. T fasteners secured. Needle gastrostomy made under  fluoroscopic guidance. Needle removed over guidewire. Track dilated  with the telescopic dilator and 22 [place] peel-away sheath advanced  into the stomach over guidewire. 18 [place] [facility] KAPIL  gastrostomy tube  advanced over the  guidewire through the peel-away  sheath into the stomach under fluoroscopic guidance. Gastrostomy tube  inflated and peel-away sheath removed. Position documented with  contrast, guidewire removed, and tube secured. Tube flushed with  saline. Sterile dressing applied. Gastrostomy tube placed to gravity  drainage. Nasogastric tube removed. No immediate complication.    Estimate blood loss: less then 1 cc.      Impression    Impression: Uncomplicated 18 [place] [facility] gastrostomy  tube  placed under fluoroscopic guidance.    Plan:   1. Nothing by mouth for 4 hours.  2. Gastrostomy tube to gravity drainage for 4 hours.    I have personally reviewed the examination and initial interpretation  and I agree with the findings.    LAQUITA MCBRIDE   XR Abdomen 2 Views    Narrative    Examination:  XR ABDOMEN 2 VW    Date:  8/4/2018 12:34 PM     Clinical Information: increased lower abdominal pain, colon CA, reeval  SBO;      Additional Information: none    Comparison: 7/24/2018 abdominal film.    Findings:     There is a paucity of gas within the abdomen and pelvis which may  represent fluid-filled loops of bowel. There are no gas-filled  distended loops of colon or small bowel to suggest obstruction. There  is a left upper quadrant presumed G-tube. There is a stent projected  over the left hemipelvis. Lung bases are grossly clear. Presumed  ostomy in the right lower quadrant of the abdomen.      Impression    Impression:    1. No gaseous distention to suggest obstruction.  2. Possible fluid-filled loops of bowel due to a paucity of gas within  the abdomen and pelvis..    KARMEN ODONNELL MD

## 2018-08-05 NOTE — PLAN OF CARE
Problem: Patient Care Overview  Goal: Plan of Care/Patient Progress Review  Outcome: Improving  [id number]  Tachy up to 115, other VSS. A&Ox4. Up independently. Double lumen PICC. Pain controlled with dilaudid PCA 0.9mg q10m. Tolerating CLD. Ileostomy with liquid output. G tube to gravity (clamped after meds). hgb 7.9- 1u RBC given. Plan to dc home tomorrow with home care PCA dilaudid.

## 2018-08-05 NOTE — PROGRESS NOTES
Care Coordinator Progress Note    Admission Date/Time:  7/24/2018  Attending MD:  Kaden Hardin, *    Data  Chart reviewed, discussed with interdisciplinary team.   Patient was admitted for:    Colon adenocarcinoma (H)  Small bowel obstruction  Peritoneal carcinomatosis (H)  Neoplasm related pain.      Coordination of Care : Discussed case with treatment team regarding discharge, discharge planned for Monday 8/6/18 , will go home with Dilaudid PCA. [facility] will be able to meet patient at home. [facility] will hook patient up at hospital prior to discharge. Per [facility] their Pharmacist has questions about high dose dilaudid , [facility] provided with pager # [phone] (Justina) pharmacy can contact her on Monday with any questions.       Plan  Anticipated Discharge Date:  8/6/18  Anticipated Discharge Plan:  Home with Dilaudid PCA pump    Tara Santos, RN-Weekend Coverage

## 2018-08-05 NOTE — PLAN OF CARE
Problem: Patient Care Overview  Goal: Plan of Care/Patient Progress Review  Outcome: Improving  AVSS. Afebrile. Denies SOB, nausea. Lower abdominal pain with improved pain relief. PCA increased yesterday to 0.8mg bumps available and fentanyl patch dose increased (patches x3 on left arm). Tolerating clear liquid diet. Voiding adequately/spontaneously. Ileostomy with small output. G-tube to gravity with green/brown output. Up independently. Anticipate discharge this week, pending pain control. Continue monitoring and with POC.

## 2018-08-06 NOTE — PROGRESS NOTES
Discharge  D: Orders for discharge and outpatient medications written.  I: Home medications and return to clinic schedule reviewed with patient. Discharge instructions and parameters for calling Health Care Provider reviewed. Patient left around 1630, picked up by family/friend.  A: Patient/family verbalized understanding and was ready for discharge.   P: Patient instructed to  medications in Pharmacy. Follow up as scheduled.    Pt's PCA was stopped and home infusion set up home PCA device. Pt left w/ fentanyl patches in place & w/ PICC line for home infusions. Pt's questions were answered & discharge instructions reviewed. Pt picked up discharge medications from pharmacy. Pt left w/ all belongings.

## 2018-08-06 NOTE — PLAN OF CARE
Problem: Patient Care Overview  Goal: Plan of Care/Patient Progress Review  Outcome: Improving    Patient's pain is adequately controlled with current regimen of 300 mcg Fentanyl patches plus Dilaudid 0.9mg IV Dilaudid available every 10 minutes via PCA pump.  Patient will discharge this afternoon after [facility] hooks patient up to ambulatory pain pump.  Patient will also have home TPN/IL.  G-tube patient & clamped for 1hr after oral meds this morning.  Ileostomy with liquid output.

## 2018-08-06 NOTE — DISCHARGE SUMMARY
[facility], [place]    Discharge Summary  Hematology / Oncology    Date of Admission:  7/24/2018  Date of Discharge:  8/6/2018  Discharging Provider: Margarita Dougherty  Date of Service (when I saw the patient): 08/06/18    Discharge Diagnoses   Small bowel obstruction  Nausea/vomiting-resolved  Abdominal pain  Hypokalemia-resolved  Hypermagnesemia/Hyperphosphatemia-resolved  PAULINA-resolved  Sigmoid adenocarcinoma  Peritoneal carcinomatosis  Anemia    History of Present Illness   Sebas Lopez is a 53 year old male with PMHx significant for ulcerative colitis and sigmoid adenocarcinoma with peritoneal carcinomatosis s/p ileostomy 7/2017 and colonic stent 3/19 (with most recent revision 7/5) who presents from clinic with 3-4 day history of nausea/vomiting and increased abdominal pain, found to have a small bowel obstruction on CT scan. NG placed for decompression with resolution of nausea/vomiting. Continued to have ostomy output. He had a venting G-tube placed as well, NG removed. Much of admission focused on adequate pain control. Palliative care followed. Will discharge home on hydromorphone PCA and fentanyl patch, pain much improved on this regimen. Tolerating liquid diet. Has close follow up with Dr. Long 8/9.      Hospital Course   Sebas Lopez was admitted on 7/24/2018.  The following problems were addressed during his hospitalization:    #Small bowel obstruction. Transition point in LLQ.  #Nausea and vomiting-improved with venting PEG tube.  #Increased abdominal pain.   Symptoms started about 3-4 days ago. Multiple emesis per day, notes abdominal pain would improve with emesis. Explains emesis appeared similar to ileostomy output in color/consistency. Ileostomy output seemed to remain the same about of output/mildly decrease. No blood in ileostomy output. Tried to keep up with PO fluid intake but could not. Found to have a SBO with dilated loops of small bowel and  transition point in LLQ on CT. Additionally labs demonstrated an PAULINA and electrolyte abnormalities (See below).   -NG to LIS for decompression. Placed palliative venting G-tube 8/1.   -Colorectal surgery consulted, ? Any surgical mgmt for SBO. They do not have any intervention to offer and recommend continuing with conservative/medical mgmt. Managed SBO conservatively.   -Palliative care team consulted, patient follows with outpatient. Discharge with Fentanyl 300mcg/72hr and hydromorphone PCA 0.9 mg q10 minute bolus bumps (no continuous rate). [facility] to set up.  - Continue dexamethasone 8mg daily x 3 days, then 4mg daily thereafter  - KUB 8/5 unremarkable.  - Lasix 20mg x 1 for fluid retention.     #Hypokalemia/Hyponatremia/Hypochoremia. Resolved.  #Hypermagnesemia/Hyperphosphatemia. Resolved.  #PAULINA. Cr on admission 2.66. Improving, Cr 1.31 on 7/26. Resolved.  Likely 2/2 above nausea/vomiting/SBO leading to dehydration. Skin with some tenting on AM exam 7/25. On TPN support (see below).   -Replace lytes per protocol prn.   -Daily BMP.     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    Follows with Dr. Long outpatient. S/p ex lap 7/2017 with small bowel resection and end ileostomy. S/p 6 cycles of FOLFOX. Admitted on 12/6/2017 for ex-lap with aborted HIPEC due to diffuse carcinomatosis. Repeat CT scan showed worsening peritoneal carcinomatosis. He was admitted to the hospital with worsening colon distention and colon stent was placed which improved his symptoms. He resumed FOLFOX C7 3/8/18 f/b hospitalization for bowel obstruction and stent replacement 3/19/2018. Continued with FOLFOX, last cycle #13 6/15/18. Repeat CT 6/27 with slightly progressed peritoneal carcinomatosis, increased colonic and rectal fluid favoring colitis. Per Dr. Long's note 6/28 next treatment options would likely be irinotecan and panitumumab. Has been unable to begin d/t recent hospitalizations.   -Follow up  with Dr. Long scheduled 8/9.     #Leukocytosis. No e/o infection at this time. Improved.  #Anemia.  Has had a mild leukocytosis on follow up labs in clinic. Admission WBC count 16.7-->elevated to 18.7 7/25. Anemia 2/2 chronic illness. Afebrile.  -BCx drawn 7/25, NGTD.  -Will monitor with daily CBC.     # Discharge Pain Plan:   - During his hospitalization, Sebas experienced pain due to sigmoid adenocarcinoma with peritoneal carcinomatosis.  The pain plan for discharge was discussed with Sebas and the plan was created in a collaborative fashion.    - See above for pain mgmt plan.     FEN:  -IVF @ 50 ml/hr + TPN volume  -PRN lyte replacement  -NPO, continue PTA TPN support, CLD-->transitioned to FLD on day of discharge.     #Severe malnutrition in context of acute illness. >2% weight loss in 1 week, moderate subcutaneous fat loss, muscle loss in temporal/facial/jaw/ and thoracic region.   -Continue TPN support, optimize nutritional support.     Code status: Full    Patient and plan discussed with staff, Dr. Babcock.    Margarita Dougherty PA-C  Hematology/Oncology  Pager #[phone]    Significant Results and Procedures   CT imaging  KUB  NG placement/removal  Palliative venting G-tube    Pending Results   These results will be followed up by Dr. Long.  Unresulted Labs Ordered in the Past 30 Days of this Admission     Date and Time Order Name Status Description    8/6/2018 0000 Prealbumin In process         Code Status   Full Code    Primary Care Physician   NGUYỄN HERNÁNDEZ    Physical Exam   Temp: 98  F (36.7  C) Temp src: Oral BP: 124/85 Pulse: 102 Heart Rate: 100 Resp: 16 SpO2: 96 % O2 Device: None (Room air)    Vitals:    08/04/18 0751 08/05/18 0839 08/06/18 0729   Weight: 71.5 kg (157 lb 11.2 oz) 71.3 kg (157 lb 3.2 oz) 71.5 kg (157 lb 10.1 oz)     Vital Signs with Ranges  Temp:  [96.2  F (35.7  C)-98.5  F (36.9  C)] 98  F (36.7  C)  Pulse:  [101-102] 102  Heart Rate:  [] 100  Resp:  [16-18] 16  BP: (106-126)/(71-85)  124/85  SpO2:  [96 %-100 %] 96 %  I/O last 3 completed shifts:  In: 3634.43 [P.O.:200; I.V.:40; IV Piggyback:1000]  Out: 4625 [Urine:2250; Emesis/NG output:975; Stool:1400]    Constitutional: Pleasant male seen sitting up in bed, in NAD. Alert and interactive. Seen ambulating in hallway as well.  HEENT: NCAT, anicteric sclerae, conjunctiva clear. Moist mucous membranes. Dentition intact/well cared for.  Respiratory: Non-labored breathing, good air exchange. Lungs are clear to auscultation bilaterally, without wheezing, crackles or rhonchi. No cough noted.   Cardiovascular: Regular rate and rhythm with no murmur, rub or gallop.  GI: Quiet bowel sounds. Abdomen is soft, non-distended, and non-tender to palpation. No rigidity or guarding. Ileostomy with dark green watery output, stoma pink. Venting G-tube in LUQ CDI.   Skin: Warm and dry. No rashes or lesions on exposed surfaces.  Musculoskeletal: Extremities grossly normal. No tenderness or edema present.   Neurologic: A &O x3, speech normal, answering questions appropriately. Moves all extremities spontaneously. Grossly non-focal.  Neuropsychiatric: Mentation and affect normal/appropriate.  VAD: PICC is c/d/i with no erythema, drainage, or tenderness.    Discharge Disposition   Discharged to home  Condition at discharge: Stable    Consultations This Hospital Stay   PHARMACY/NUTRITION TO START AND MANAGE TPN  VASCULAR ACCESS CARE ADULT IP CONSULT  SURGERY GENERAL ADULT IP CONSULT  PALLIATIVE CARE ADULT IP CONSULT  COLORECTAL SURGERY ADULT IP CONSULT  VASCULAR ACCESS CARE ADULT IP CONSULT  COLORECTAL SURGERY ADULT IP CONSULT  GI LUMINAL ADULT IP CONSULT  INTERVENTIONAL RADIOLOGY ADULT/PEDS IP CONSULT    Discharge Orders     CBC with platelets differential   Last Lab Result: Hemoglobin (g/dL)      Date                     Value                08/06/2018               8.0 (L)          ----------     Comprehensive metabolic panel     Magnesium     Phosphorus      Medication Therapy Management Referral     Home infusion referral     Discharge Instructions   If questions or problems arise regarding tube function (e.g. leaking, dislodges, etc.) Contact Interventional Radiology department 24 hours a day.    For procedures that were done at the [facility] sites,   8:00-4:30 PM Monday through Friday    Contact:[phone].    For afterhours and weekends call the [facility] main phone line [phone] and ask for the [facility] on call physician number.    If DIRECTED by the RADIOLOGIST, related to specific problems with the tube functioning,  go to the Emergency Department.     Discharge Instructions   Patient to make a follow up appointment in IR clinic in 10-14 days for the removal of the retention sutures at the G or GJ tube site. Phone number is [phone] if needed.     Reason for your hospital stay   Admitted with nausea, vomiting and increased abdominal pain.     Follow Up and recommended labs and tests   Follow up as scheduled:     Activity   Your activity upon discharge: activity as tolerated     When to contact your care team   [facility] triage line at [phone] for temp >100.4, uncontrolled nausea/vomiting/diarrhea/constipation, unrelieved pain, bleeding not relieved with pressure, dizziness, chest pain, shortness of breath, loss of consciousness, and any new or concerning symptoms.     Full Code     Diet   Follow this diet upon discharge: Orders Placed This Encounter     Full Liquid Diet & TPN support       Discharge Medications   Current Discharge Medication List      START taking these medications    Details   dexamethasone (DECADRON) 4 MG tablet Take 1 tablet (4 mg) by mouth daily  Qty: 20 tablet, Refills: 0    Associated Diagnoses: Colon adenocarcinoma (H); Neoplasm related pain      fentaNYL (DURAGESIC) 100 mcg/hr 72 hr patch Place 3 patches onto the skin every 72 hours remove old patch.  Qty: 10 patch, Refills: 0     Associated Diagnoses: Colon adenocarcinoma (H); Neoplasm related pain; Peritoneal carcinomatosis (H)      order for DME Equipment being ordered: home suction machine with tubing for connection to venting G-tube  Treatment Diagnosis: colon adenocarcinoma  Qty: 1 Device, Refills: 0    Associated Diagnoses: Colon adenocarcinoma (H); Small bowel obstruction      pantoprazole (PROTONIX) 40 MG EC tablet Take 1 tablet (40 mg) by mouth every morning (before breakfast)  Qty: 30 tablet, Refills: 0    Associated Diagnoses: History of recent steroid use         CONTINUE these medications which have NOT CHANGED    Details   LORazepam (ATIVAN) 0.5 MG tablet Take 1 tablet (0.5 mg) by mouth every 4 hours as needed (Anxiety, Nausea/Vomiting or Sleep)  Qty: 30 tablet, Refills: 5    Associated Diagnoses: Peritoneal carcinomatosis (H); Cancer of sigmoid colon (H)      ondansetron (ZOFRAN-ODT) 8 MG ODT tab Take 1 tablet (8 mg) by mouth every 8 hours as needed for nausea  Qty: 60 tablet, Refills: 5    Associated Diagnoses: Nausea      parenteral nutrition - PTA/DISCHARGE ORDER Inject into the vein daily [facility]      sodium chloride 0.9% SOLN BOLUS Inject 1,000 mLs into the vein daily as needed For hydration.  Qty: 1000 mL, Refills: 0      naloxone (NARCAN) nasal spray Spray 1 spray (4 mg) into one nostril alternating nostrils as needed for opioid reversal every 2-3 minutes until assistance arrives  Qty: 0.2 mL, Refills: 0    Associated Diagnoses: Cancer of sigmoid colon (H); Long term current use of opiate analgesic         STOP taking these medications       amylase-lipase-protease (CREON) 92830 UNITS CPEP Comments:   Reason for Stopping:         cyanocobalamin (VITAMIN  B-12) 1000 MCG tablet Comments:   Reason for Stopping:         dronabinol (MARINOL) 5 MG capsule Comments:   Reason for Stopping:         fentaNYL (DURAGESIC) 25 mcg/hr 72 hr patch Comments:   Reason for Stopping:         FentaNYL 37.5 MCG/HR PT72 Comments:   Reason for  Stopping:         loperamide (IMODIUM) 2 MG capsule Comments:   Reason for Stopping:         Multiple Vitamins-Minerals (MULTIVITAMIN & MINERAL PO) Comments:   Reason for Stopping:         NIACIN PO Comments:   Reason for Stopping:         omeprazole (PRILOSEC) 20 MG CR capsule Comments:   Reason for Stopping:         ondansetron (ZOFRAN) 8 MG tablet Comments:   Reason for Stopping:         opium tincture 10 MG/ML (1%) liquid Comments:   Reason for Stopping:         oxyCODONE IR (ROXICODONE) 20 MG TABS immediate release tablet Comments:   Reason for Stopping:         oxyCODONE IR (ROXICODONE) 5 MG tablet Comments:   Reason for Stopping:         prochlorperazine (COMPAZINE) 10 MG tablet Comments:   Reason for Stopping:             Allergies   Allergies   Allergen Reactions     Liquid Adhesive Rash     Dermabond, rash with pustules, occurred with abdominal surgery and port removal      Data   Most Recent 3 CBC's:  Recent Labs   Lab Test  08/06/18   0533  08/05/18   0547  08/04/18   0551   WBC  9.3  14.8*  12.6*   HGB  8.0*  7.9*  8.2*   MCV  84  86  88   PLT  395  407  385      Most Recent 3 BMP's:  Recent Labs   Lab Test  08/06/18   0533  08/05/18   0547  08/04/18   0551   NA  136  137  138   POTASSIUM  4.4  4.4  4.6   CHLORIDE  104  106  107   CO2  25  26  25   BUN  26  26  24   CR  0.62*  0.60*  0.62*   ANIONGAP  7  6  6   ANNAMARIE  8.2*  8.2*  8.6   GLC  109*  122*  148*     Most Recent 2 LFT's:  Recent Labs   Lab Test  08/06/18   0533  07/30/18   0548   AST  15  20   ALT  15  15   ALKPHOS  137  131   BILITOTAL  0.2  0.2     Most Recent INR's and Anticoagulation Dosing History:  Anticoagulation Dose History     Recent Dosing and Labs Latest Ref Rng & Units 2/21/2018 3/18/2018 7/5/2018 7/30/2018 7/31/2018 7/31/2018 8/6/2018    INR 0.86 - 1.14 1.27(H) 1.12 1.49(H) 1.04 1.07 1.06 1.10        Most Recent 3 Troponin's:No lab results found.  Most Recent Cholesterol Panel:  Recent Labs   Lab Test  08/06/18   0533   TRIG  80      Most Recent 6 Bacteria Isolates From Any Culture (See EPIC Reports for Culture Details):  Recent Labs   Lab Test  07/25/18   1406  07/25/18   1401  07/04/18   2347  07/03/18   1714  07/03/18   1704  02/25/18   0603   CULT  No growth  No growth  No growth  No growth  No growth  No growth  No growth     Most Recent TSH, T4 and A1c Labs:No lab results found.  Results for orders placed or performed during the hospital encounter of 07/24/18   XR Abdomen Port 1 View    Narrative    XR ABDOMEN PORT 1 VW  7/24/2018 10:11 PM      HISTORY: ng tube placement;     COMPARISON: CT earlier same day    FINDINGS: Gastric tube is looped in the distal esophagus. Gaseous  distention of the stomach. No pleural effusion. No confluent pulmonary  opacities within the lung bases.      Impression    IMPRESSION: Gastric tube coiled within the distal esophagus which is  repositioned on the subsequent radiograph.    I have personally reviewed the examination and initial interpretation  and I agree with the findings.    KARMEN ODONNELL MD   XR Abdomen Port 1 View    Narrative    Single view of the abdomen  7/24/2018 11:34 PM      HISTORY: NG tube placement    COMPARISON: Radiograph earlier same day    FINDINGS: Gastric tube is repositioned, tip projecting over the  thoracic fundus. Gaseous distention of the stomach, decreased since  prior. No pleural effusion. No confluent pulmonary opacities within  the lung bases.      Impression    IMPRESSION: Gastric tube repositioning, in proper position over the  fundal/body region.    I have personally reviewed the examination and initial interpretation  and I agree with the findings.    KARMEN ODONNELL MD   IR Gastrostomy Tube Percutaneous Plcmnt    Narrative    Procedure 8/1/2018.  Gastrostomy tube placement under fluoroscopic guidance.    History: Past medical history of ulcerative colitis status post  ileostomy, sigmoid adenocarcinoma with peritoneal carcinomatosis,  status post colonic stents  for recurrent bowel obstructions.  Percutaneous gastrostomy tube requested for decompression.    Comparison: CT abdomen pelvis 7/24/2018    Staff: Wilson Jose MD    Fellow: Jericho Guzman MD    Procedure performed by Dr. Guzman under my supervision. I, Dr. Wilson Jose, was present for the entire procedure.    Monitoring: Patient was placed on continuous monitoring supervised by  the IR nursing staff and IR attending. Patient remained stable  throughout the procedure.    Medications:  1. Fentanyl 100 mcg IV  2. Versed 2 mg IV  3. 1% lidocaine for local anesthesia  4. Glucagon 1 mg IV    Face to face sedation time: 30 minutes    Fluoroscopy time: 4.2 minutes    Procedure/Findings: The patient understood the limitations,  alternatives, and risks of the procedure and requested the procedure  be performed. Both written and oral consent were obtained.    Nasogastric tube placed under fluoroscopic guidance. The left upper  quadrant was prepped and draped in the usual sterile fashion.  Intravenous glucagon was administered. The liver margins were  delineated with ultrasound and marked. Stomach inflated with air  through the nasogastric tube.     1% lidocaine was used for local anesthesia. Under fluoroscopic  guidance, gastropexy was made with two [facility] Saf-T-Pexy  T-fasteners. T fasteners secured. Needle gastrostomy made under  fluoroscopic guidance. Needle removed over guidewire. Track dilated  with the telescopic dilator and 22 Portuguese peel-away sheath advanced  into the stomach over guidewire. 18 Portuguese [facility]  gastrostomy tube  advanced over the guidewire through the peel-away  sheath into the stomach under fluoroscopic guidance. Gastrostomy tube  inflated and peel-away sheath removed. Position documented with  contrast, guidewire removed, and tube secured. Tube flushed with  saline. Sterile dressing applied. Gastrostomy tube placed to gravity  drainage. Nasogastric tube removed. No  immediate complication.    Estimate blood loss: less then 1 cc.      Impression    Impression: Uncomplicated 18 Japanese [facility] gastrostomy  tube  placed under fluoroscopic guidance.    Plan:   1. Nothing by mouth for 4 hours.  2. Gastrostomy tube to gravity drainage for 4 hours.    I have personally reviewed the examination and initial interpretation  and I agree with the findings.    LAQUITA GIORDANOESTHA   XR Abdomen 2 Views    Narrative    Examination:  XR ABDOMEN 2 VW    Date:  8/4/2018 12:34 PM     Clinical Information: increased lower abdominal pain, colon CA, reeval  SBO;      Additional Information: none    Comparison: 7/24/2018 abdominal film.    Findings:     There is a paucity of gas within the abdomen and pelvis which may  represent fluid-filled loops of bowel. There are no gas-filled  distended loops of colon or small bowel to suggest obstruction. There  is a left upper quadrant presumed G-tube. There is a stent projected  over the left hemipelvis. Lung bases are grossly clear. Presumed  ostomy in the right lower quadrant of the abdomen.      Impression    Impression:    1. No gaseous distention to suggest obstruction.  2. Possible fluid-filled loops of bowel due to a paucity of gas within  the abdomen and pelvis..    KARMEN ODONNELL MD

## 2018-08-06 NOTE — PLAN OF CARE
Problem: Pain, Chronic (Adult)  Goal: Identify Related Risk Factors and Signs and Symptoms  Related risk factors and signs and symptoms are identified upon initiation of Human Response Clinical Practice Guideline (CPG).   Outcome: No Change    Triggered SIRS, lactic acid 1.0, afebrile, tachycardic HR (100's) and OVSS on room air.  Pain is comfortably managed with Dilaudid PCA (0.9 mg q 10 min, hour limit 6 mg), used 5.4 mg this shift and transdermal Fentanyl patches (100 mcg x 3) on left deltoid.  Denies nausea/vomiting.  Cyclic TPN and lipids infusing via PICC.  G-tube open to gravity, dressing changed, C/D/I.  Adequate urine and ileostomy output.  Up ad juan.  Plan to discharge tomorrow on PCA and TPN.  Continue to monitor and POC.

## 2018-08-06 NOTE — PROGRESS NOTES
Care Coordinator - Discharge Planning    Admission Date/Time:  7/24/2018  Attending MD:  Kaden Hardin, *     Data  Date of initial CC assessment:  7/26  Chart reviewed, discussed with interdisciplinary team.   Patient was admitted for:   1. Colon adenocarcinoma (H)    2. Small bowel obstruction    3. Peritoneal carcinomatosis (H)    4. Neoplasm related pain    5. Cancer of sigmoid colon (H)         Assessment   Full assessment completed in previous note. Per Heme/Onc team, patient is medically stable for discharge home today.     Coordination of Care and Referrals: Conferred with [facility] and provided DC update. Faxed prescriptions for home TPN (which will resume) and Dilaudid PCA (which is new). [facility] provides home RN visits and will receive resumption orders by [facility].     Nerissa Dougherty PA-C signing discharge orders shortly and then [facility] will coordinate timing of hospital delivery and update patient directly.       Plan  Anticipated Discharge Date:  8/6  Anticipated Discharge Plan:  home    CTS Handoff completed:  YES    Karime Junior  7D Heme/Onc RN Care Coordinator  Pager [phone]

## 2018-08-06 NOTE — PROGRESS NOTES
Addendum 1530  PCA dilaudid and supplies arrived at 1500.  Med and pump settings verified with orders for both dilaudid and carrier fluid.  Extension tubing and Y site added to purple lumen of PICC.  Dilaudid PCA initiated 0.9 mg bolus with q 10 min lockout.  Carrier fluid 0.9% NS running continuously at 5 ml/hr.  Instructed Sebas on how to give bolus dose using both pump and supplied cord.   Sebas uses the cadd pump for his TPN so is familiar with functioning and battery changing.  Sebas verbalized understanding of Instructions given.  He is ready for discharge from \[facility]\"" perspective.  [facility]    Home Infusion  Sebas is discharging today and is going home on cycled TPN.  He was on service with \[facility]\"" prior to this readmission and was independent with his TPN infusions.  Sebas will also be going home on a dilaudid PCA which he has not had at home before.    Met with Sebas at bedside to discuss dc plans.   Explained administration method for his PCA via the cadd pump and that once \[facility]\"" delivers drug and pump to the hospital I would return to change him over to the home PCA infusion.  Delivery expected by 1500.      Safia HENDRICKS  [facility] Liaison  [phone] pager

## 2018-08-06 NOTE — PROGRESS NOTES
Prior Authorization Approval    hydromorphone 10mg/ml compounded solution  Date Initiated: 08/03/2018  Date Completed: 08/03/2018  Prior Auth Type: Clinical     Status: Approved    Effective Date: 08/03/2018 - 11/03/2018  Copay: 0.00  [place] Filled: No    Insurance: [employer]  Ph: [phone] opt. 2  ID: [id number]  Case Number: [id number]  Submitted Via: Stefani Leo  Pharmacy Liaison  Ph: [phone] Page: [phone]

## 2018-08-06 NOTE — PLAN OF CARE
Problem: Patient Care Overview  Goal: Plan of Care/Patient Progress Review  Outcome: Improving  Trending tachy; OVSS. Afebrile. Denies SOB, nausea. Lower abdominal pain comfortably managed with 0.9mg bumps of dilaudid via PCA pump and fentanyl patches x3 on left arm. Tolerating clear liquid diet. Voiding adequately/spontaneously. Ileostomy with small output. G-tube to gravity with green/yellow output. Up independently. Discharge home with PCA pump, managed by [facility] home infusion. Continue monitoring and with POC.

## 2018-08-07 NOTE — PROGRESS NOTES
This is a recent snapshot of the patient's [facility] medical record.  For current drug dose and complete information and questions, call [phone] or In Basket pool, [facility] home infusion ([id number])  CSN Number:  [id number]

## 2018-08-08 NOTE — TELEPHONE ENCOUNTER
Attempted to call patient to follow up after hospital discharge and to assist with follow up apt scheduling, but was unable to reach him. Left VM asking for call back.    *Note: patient was scheduled for follow up 8/15 - scheduling called to see if patient wanted to move that up to 8/9 when he is here to see Dr. Long, but patient declined and cancelled his apt on 8/15.

## 2018-08-09 NOTE — PROGRESS NOTES
Palliative Staff/Outpatient Clinic    (This note was transcribed using voice recognition software. While I review and edit the transcription, I may miss errors, and the software sometimes does unexpected capitalizations and formatting that I miss. 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  Seen by Dr. Mota, palliative fellow last year.     Recurrent bowel obstructions s/p multiple stentings. As of Aug 2018 has a venting G tube, is on TPN, and a home PCA for pain control.     [facility] Outpatient Palliative Care Opioid Prescribing Safety Plan     Opioid Safety Education: Reviewed by Lorenzo Trujillo on 07/20/18  Opioid Risk Assessment: Performed byLorenzo Trujillo on 07/20/18. ORT 0  Mood Disorder Assessment: Performed byLorenzo Trujillo on 07/20/18   reviewed with every prescription, last reviewed by Lorenzo Trujillo on 07/20/2018      Expected prognosis: <1 year  Risk: Low or Medium (ORT 0-7)  No further recommendations.    History:  I communicate with our inpatient team, reviewed the chart, and confirmed key details of his recent history with him today.  He was hospitalized for nearly a couple weeks with another bowel obstruction which was refractory and he ended up needing a venting G-tube.  Pain was very severe and we ended up sending him home on 300 mcg/h transdermal fentanyl and a home Dilaudid PCA (0.9 mg bolus dose, no continuous).  He remains on TPN.  He is meeting with his oncologist today to discuss next steps-it is anticipation that new systemic therapy will be offered.    Overall he is doing pretty well-she has been home 3 days now.  It sounds like his ileostomy is draining a little more.  He is taking a full liquid diet and is not feeling bloated afterwards and can clamp his G-tube for a while.  He is not totally clear with me  "how often he is draining his G-tube and how much-I do not think he really pays attention to it- but he can certainly have at least 8 ounces by mouth and not feel bloated, nor need to drain his tube quickly afterwards.    Overall he feels his pain is very well controlled.  I reinterrogate his PCA-he is taken 60 doses of his PCA and about 72 hours.  Denies any side effects including itching nausea or cognitive side effects.  He feels awake and alert.    He says his mood and spirits are good.  He is hopeful for more chemotherapy and feels motivated to continue active treatment.  Things are going okay at home.  He is receiving home nursing and home infusion.    SH: Reviewed and unchanged    ROS: Comprehensive review of systems is negative otherwise    PE: /80 (BP Location: Left arm, Patient Position: Sitting, Cuff Size: Adult Regular)  Pulse 89  Temp 98.1  F (36.7  C) (Oral)  Resp 16  Ht 1.753 m (5' 9.02\")  Wt 71.8 kg (158 lb 6.4 oz)  SpO2 98%  BMI 23.38 kg/m2   Wt Readings from Last 3 Encounters:   08/09/18 71.8 kg (158 lb 6.4 oz)   08/06/18 71.5 kg (157 lb 10.1 oz)   07/24/18 65 kg (143 lb 6.4 oz)     Alert, comfortable appearing, NAD.   Head NCAT.  Eyes anicteric without injection  Face symmetric, eyes conjugate  Mouth pink, moist, no lesions.  Neck supple without masses, thyromegaly, LAD  Lungs unlabored, CTAB  CV rrr s1s2  Abd soft, ntnd, benign. G tube in place. R ileostomy with liquid yellow brown stool.  Back well aligned, no masses, tenderness  RUE PICC c/d/i   No LE edema  Skin warm, dry, without lesions  MSK joints of hand normal; diffuse mild sarcopenia  Neuro Face symmetric,   Neuropsych exam normal including affect, sensorium, gross memory, thought processes, and fund of knowledge.     Impression & Recommendations:  53-year-old man with metastatic sigmoid adenocarcinoma causing recurrent bowel obstructions who is now receiving TPN, uses a venting G-tube, and has a home PCA.  He is clearly " doing much better since he has been home.  I do wonder if his bowel obstruction is easing up a bit and it seems that at least intermittently he is passing orally ingested liquids through.  We discussed this.  He is alert and is not having side effects from his opiates and appears to be using them safety.  We discussed opioid safety including safe  disposal of fentanyl patches and   His old oxycodone.  Answered his questions.  We will see him in 2 weeks and keep a close eye on him given the new opiate plan  which involves very high doses right now.    Chart data reviewed today:      Family History   Problem Relation Age of Onset     Hypertension Father      Diabetes Father      Past Medical History:   Diagnosis Date     Adenocarcinoma of sigmoid colon (H)     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     Ulcerative colitis (H)      Past Surgical History:   Procedure Laterality Date     COLONOSCOPY  2017     COLONOSCOPY N/A 11/30/2017    Procedure: COLONOSCOPY;  Colonoscopy;  Surgeon: Rob Dudley MD;  Location: [facility]     COLONOSCOPY N/A 2/6/2018    Procedure: COMBINED COLONOSCOPY, STENT PLACEMENT;  COMBINED COLONOSCOPY with Colonic Stent Placement;  Surgeon: Guru Lionel Mar MD;  Location: [facility]     COLONOSCOPY N/A 3/19/2018    Procedure: COMBINED COLONOSCOPY, STENT PLACEMENT;  flexible sigmoidoscopy with stent placement and dilation;  Surgeon: Alexis Rios MD;  Location: [facility]     COLONOSCOPY N/A 7/5/2018    Procedure: COMBINED COLONOSCOPY, STENT PLACEMENT;  flexible sigmoidoscopy with colonic stent placement ;  Surgeon: Alexis Rios MD;  Location: [facility]     GI SURGERY  07/10/2017    Extensive lysis of adhesions, small bowel resection with end ileostomy.      HERNIA  REPAIR       INSERT PORT VASCULAR ACCESS Right 8/10/2017    Procedure: INSERT PORT VASCULAR ACCESS;  Single Lumen Right Chest Power Port;  Surgeon: Malena Andrew PA-C;  [facility]     LAPAROSCOPY DIAGNOSTIC (GENERAL) N/A 12/6/2017    Procedure: LAPAROSCOPY DIAGNOSTIC (GENERAL);  Diagnostic Laparoscopy, Exploratory Laparotomy Anesthesia Block ;  Surgeon: Rob Dudley MD;  Location: [facility]     LAPAROTOMY EXPLORATORY N/A 12/6/2017    Procedure: LAPAROTOMY EXPLORATORY;;  Surgeon: Rob Dudley MD;  Location: [facility]     ORTHOPEDIC SURGERY Left     HIP ARTHROPLASTY     PICC INSERTION Right 02/23/2018    5Fr DL BioFlo PICC, 44cm (3cm external) in the R basilic vein w/ tip in the SVC RA junction     Allergies   Allergen Reactions     Liquid Adhesive Rash     Dermabond, rash with pustules, occurred with abdominal surgery and port removal    Medications: I have reviewed the patient's medication profile. MNPMP: reviewed    Data reviewed:  I reviewed recent labs and imaging, my comments:  Cr stable/labs good    Thank you for involving us in the patient's care.   Lorenzo Trujillo MD / Palliative Medicine / Pager [phone] / [facility] Inpatient Team Consult Pager [phone] (answered 8am-430pm M-F) - ok to text page via [employer] / After-Hours Answering Service [phone] / Palliative Clinic in the [facility] [place] [facility] [phone] (scheduling); [phone] (triage).  .

## 2018-08-09 NOTE — NURSING NOTE
"Oncology Rooming Note    August 9, 2018 2:49 PM   Sebas Lopez is a 53 year old male who presents for:    Chief Complaint   Patient presents with     Blood Draw     labs drawn from picc line by rn.  vs taken at earlier appointment.     Oncology Clinic Visit     Return Colon Ca     Initial Vitals: /80  Pulse 89  Temp 98.1  F (36.7  C) (Oral)  Resp 16  Ht 1.753 m (5' 9\")  Wt 71.7 kg (158 lb)  SpO2 98%  BMI 23.33 kg/m2 Estimated body mass index is 23.33 kg/(m^2) as calculated from the following:    Height as of this encounter: 1.753 m (5' 9\").    Weight as of this encounter: 71.7 kg (158 lb). Body surface area is 1.87 meters squared.  No Pain (1) Comment: drainage tube    No LMP for male patient.  Allergies reviewed: Yes  Medications reviewed: Yes    Medications: Medication refills not needed today.  Pharmacy name entered into [facility]: [facility] - [place] - [place], [place] - [address] [place] [address]    Clinical concerns: no new concerns; treatment planning     6 minutes for nursing intake (face to face time)     Tara Velasquez CMA              "

## 2018-08-09 NOTE — NURSING NOTE
"Oncology Rooming Note    August 9, 2018 1:03 PM   Sebas Lopez is a 53 year old male who presents for:    Chief Complaint   Patient presents with     Oncology Clinic Visit     Return : Palliative     Initial Vitals: /80 (BP Location: Left arm, Patient Position: Sitting, Cuff Size: Adult Regular)  Pulse 89  Temp 98.1  F (36.7  C) (Oral)  Resp 16  Ht 1.753 m (5' 9.02\")  Wt 71.8 kg (158 lb 6.4 oz)  SpO2 98%  BMI 23.38 kg/m2 Estimated body mass index is 23.38 kg/(m^2) as calculated from the following:    Height as of this encounter: 1.753 m (5' 9.02\").    Weight as of this encounter: 71.8 kg (158 lb 6.4 oz). Body surface area is 1.87 meters squared.  Mild Pain (2) Comment: left abd. - around GI Tube   No LMP for male patient.  Allergies reviewed: Yes  Medications reviewed: Yes    Medications: Medication refills not needed today.  Pharmacy name entered into [employer]: [facility] [place] [address] [place] [address]    Clinical concerns: Patient states no further concerns at this time. Dr. Trujillo was NOT notified.    10 minutes for nursing intake (face to face time)     Nohemi Brown CMA              "

## 2018-08-09 NOTE — PROGRESS NOTES
This is a recent snapshot of the patient's [facility] medical record.  For current drug dose and complete information and questions, call [phone] or In Basket pool, [facility] home infusion ([id number])  CSN Number:  [id number]

## 2018-08-09 NOTE — PROGRESS NOTES
This is a recent snapshot of the patient's [facility] medical record.  For current drug dose and complete information and questions, call [phone] or In Basket pool, [facility] home infusion ([id number])  CSN Number:  [id number]

## 2018-08-09 NOTE — NURSING NOTE
Chief Complaint   Patient presents with     Blood Draw     labs drawn from picc line by rn.  vs taken at earlier appointment.     Labs drawn from picc line by rn.  Line flushed with saline.  Pt checked in to next appointment.    Elo Miles RN

## 2018-08-09 NOTE — LETTER
8/9/2018       RE: Sebas Lopez  [address] [place] [address]     Dear Colleague,    Thank you for referring your patient, Sebas Lopez, to the [facility]. Please see a copy of my visit note below.    Oncology follow up visit:  Date on this visit: 8/9/2018         CC  sigmoid adenocarcinoma with peritoneal carcinomatosis- MSI- Intact KRAS wild type    Primary Physician: Waylon Han     History Of Present Illness:     Please see previous note for details. I have copied and updated from prior notes.   Sebas is a 53-year-old male who has a history of ulcerative colitis diagnosed more than 20 years ago, but he has not had medical management for that.  He was doing well, but in 05/2017 he presented with a few weeks of abdominal pain.  At that time, his imaging showed that he had a sigmoid wall thickening with adjacent mesenteric lymphadenopathy and free fluid near the abdominal wall mesh from his prior hernia surgery.  He subsequently underwent a colonoscopy which was incomplete and showed an obstructing sigmoid colon mass.  The biopsy from the sigmoid mass showed sigmoid adenocarcinoma.  He then developed obstructive symptoms and underwent exploratory laparotomy on 07/10/2017.  During that he was found to have diffuse peritoneal carcinomatosis.  Extensive lysis of adhesions was performed and a small bowel resection was performed with end ileostomy.  The biopsies from the multiple large peritoneal metastasis also were positive for metastatic adenocarcinoma.      He started palliative FOLFOX on 8/11/17. C#6 given on 10/26/17  After 6 cycles, he has stable disease    On 12/6/17, he had Diagnostic laparoscopy and Exploratory laparotomy, but HIPEC was not performed as Peritoneal cancer index was 26 with diffuse involvement of the small bowel as well as the small bowel mesentery.     He then presented to ED on 1/26 with abdominal pain and associated nausea. CT A/P on 1/26 with  5.2 x 4.5 x 3.6 cm proximal sigmoid mass causnig colonic obstriction with singificant distention of cecum (7cm in diameter), ascending colon and proximal transverse colon. No pneumatosis or pneumoperitoneum. Also small volume of ascites with associated findings concerning for peritoneal carcinomatosis, increased from prior studies. Dr. Dudley was consulted and recommended no surgical intervention.    He was then seen in clinic on 1/29/2018 as well as yesterday because for the last few days he was unable to tolerate solid foods and he was getting abdominal cramping and some nausea. He also noticed that his ostomy output decreased. He was given IV fluids as well as antibiotics and yesterday he was started on OxyContin 10 mg twice a day along with p.r.n. oxycodone. Of note a few weeks ago he was started on short acting octreotide to decrease the ostomy output but he stopped taking it about one week ago and few days prior to that he noticed worsening of the abdominal cramping.      Repeat CT scan showed worsening peritoneal carcinomatosis and he is getting more symptomatic in terms of worsening abdominal pain and difficulty eating because of worsening abdominal cramping. He was admitted to the hospital with worsening colon distention and colon stent was placed which improved his symptoms.     He was recently hospitalized from 2/20-2/25/18 with presumed infectious colitis and bacteremia. His port was removed. He was discharged home on IV vancomycin via a PICC line which he completed on 3/7/2018.     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 He was hospitalized with a bowel obstruction from 3/18-3/20/18 and a colonic stent was placed on 3/19/18.  C#8 3/22/18 FOLFOX  C#9 4/5/18 FOLFOX      Repeat CT shows slightly improved disease and less ascites now.   Because of progressive fatigue, we stopped the 5-FU bolus and leucovorin and continued with the 5-FU infusion and  oxaliplatin.     C#10 5/4/18  C#11 5/18/18  C#13 5/31/18  C#13 6/15/18    Repeat CT scan showed worsening peritoneal disease. There is also increased fluid in the proximal control on as well as post stent fluid in the rectum. This could be consistent with colitis  I gave him levofloxacin/flagyl and plan was to switch therapy to irinotecan and panitumumab though was hospitalized on 7/3 for abdominal pain and bloating as well as rectal bleeding. Found to have colitis with increased fluid dilation of the colon. GI did a flex sig and placed a colon stent for the third time. Other stents were found to have ingrowth of tumor.   Again admitted on 7/24 with SBO and PAULINA. Venting G tube placed which helped with N/V.    He comes in today and tells me that overall he has been doing well.  It has been 3 days since he went home.  His abdominal pain is under good control.  Currently he is using a fentanyl 300 mcg in addition to Dilaudid PCA.  His nausea is minimal and very occasionally he has to take Zofran.  Ostomy site is working well.  Venting G-tube really helped.  He thinks that the combination of ostomy and a venting G-tube on average puts out 2 L a day.  He is not using anything to decrease the ostomy output currently.  He is on a full liquid diet which for the most part he is tolerating well and occasionally has to use the venting G-tube.  He continues to be on TPN.  He is getting 500 cc IV fluids on a daily basis as well.  No bleeding.  No more fevers or infections.  No trouble breathing.  No new swellings.  Today he denies noticing any neuropathy.  Overall he thinks his energy is slowly improving and he is walking more at home.  He has gained a few pounds.    ECOG 2    ROS:  Rest of the complains of review of the system was essentially unremarkable.      I reviewed her history in Epic as below      Past Medical/Surgical History:  Past Medical History:   Diagnosis Date     Adenocarcinoma of sigmoid colon (H)     stage IV  sigmoid adenocarcinoma with peritoneal metastasis.     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     Ulcerative colitis (H)      Past Surgical History:   Procedure Laterality Date     COLONOSCOPY  2017     COLONOSCOPY N/A 11/30/2017    Procedure: COLONOSCOPY;  Colonoscopy;  Surgeon: Rob Dudley MD;  Location: [facility]     COLONOSCOPY N/A 2/6/2018    Procedure: COMBINED COLONOSCOPY, STENT PLACEMENT;  COMBINED COLONOSCOPY with Colonic Stent Placement;  Surgeon: Guru Lionel Mar MD;  Location: [facility]     COLONOSCOPY N/A 3/19/2018    Procedure: COMBINED COLONOSCOPY, STENT PLACEMENT;  flexible sigmoidoscopy with stent placement and dilation;  Surgeon: Alexis Rios MD;  Location: [facility]     COLONOSCOPY N/A 7/5/2018    Procedure: COMBINED COLONOSCOPY, STENT PLACEMENT;  flexible sigmoidoscopy with colonic stent placement ;  Surgeon: Alexis Rios MD;  Location: [facility]     GI SURGERY  07/10/2017    Extensive lysis of adhesions, small bowel resection with end ileostomy.      HERNIA REPAIR       INSERT PORT VASCULAR ACCESS Right 8/10/2017    Procedure: INSERT PORT VASCULAR ACCESS;  Single Lumen Right Chest Power Port;  Surgeon: Malena Andrew PA-C;  Location: [facility]     LAPAROSCOPY DIAGNOSTIC (GENERAL) N/A 12/6/2017    Procedure: LAPAROSCOPY DIAGNOSTIC (GENERAL);  Diagnostic Laparoscopy, Exploratory Laparotomy Anesthesia Block ;  Surgeon: Rob Dudley MD;  Location: [facility]     LAPAROTOMY EXPLORATORY N/A 12/6/2017    Procedure: LAPAROTOMY EXPLORATORY;;  Surgeon: Rob Dudley MD;  Location: [facility]     ORTHOPEDIC SURGERY Left     HIP ARTHROPLASTY     PICC INSERTION Right 02/23/2018    5Fr DL BioFlo PICC, 44cm (3cm external) in the R basilic vein w/ tip in the SVC RA junction      Ulcerative colitis.  Left hip replacement.        Cancer History:   As above    Allergies:  Allergies as of 08/09/2018 - Julius as Reviewed 08/09/2018   Allergen Reaction Noted     Liquid adhesive Rash 03/01/2018     Current Medications:  Current Outpatient Prescriptions   Medication Sig Dispense Refill     dexamethasone (DECADRON) 4 MG tablet Take 1 tablet (4 mg) by mouth daily 20 tablet 0     fentaNYL (DURAGESIC) 100 mcg/hr 72 hr patch Place 3 patches onto the skin every 72 hours remove old patch. 30 patch 0     LORazepam (ATIVAN) 0.5 MG tablet Take 1 tablet (0.5 mg) by mouth every 4 hours as needed (Anxiety, Nausea/Vomiting or Sleep) 30 tablet 5     ondansetron (ZOFRAN-ODT) 8 MG ODT tab Take 1 tablet (8 mg) by mouth every 8 hours as needed for nausea 60 tablet 5     pantoprazole (PROTONIX) 40 MG EC tablet Take 1 tablet (40 mg) by mouth every morning (before breakfast) 30 tablet 0     parenteral nutrition - PTA/DISCHARGE ORDER Inject into the vein daily [facility]       prochlorperazine (COMPAZINE) 10 MG tablet        fentaNYL (DURAGESIC) 25 mcg/hr 72 hr patch        FentaNYL 37.5 MCG/HR PT72        naloxone (NARCAN) nasal spray Spray 1 spray (4 mg) into one nostril alternating nostrils as needed for opioid reversal every 2-3 minutes until assistance arrives (Patient not taking: Reported on 8/9/2018) 0.2 mL 0     ondansetron (ZOFRAN) 8 MG tablet        order for DME Equipment being ordered: home suction machine with tubing for connection to venting G-tube  Treatment Diagnosis: colon adenocarcinoma 1 Device 0     sodium chloride 0.9% SOLN BOLUS Inject 1,000 mLs into the vein daily as needed For hydration. 1000 mL 0      Family History:  Family History   Problem Relation Age of Onset     Hypertension Father      Diabetes Father       No family history of cancer.  He does not have any kids.       Social History:  Social History     Social History     Marital status: Single     Spouse name: N/A     Number of children: N/A     Years of education: N/A     Occupational  "History     Not on file.     Social History Main Topics     Smoking status: Never Smoker     Smokeless tobacco: Never Used     Alcohol use 3.0 oz/week     5 Cans of beer per week      Comment: none for last 4 months     Drug use: No     Sexual activity: Not on file     Other Topics Concern     Not on file     Social History Narrative   He does not smoke and does not drink.  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  He lives with his girlfriend, Bessie.       Physical Exam:  /80  Pulse 89  Temp 98.1  F (36.7  C) (Oral)  Resp 16  Ht 1.753 m (5' 9\")  Wt 71.7 kg (158 lb)  SpO2 98%  BMI 23.33 kg/m2  CONSTITUTIONAL: No apparent distress  EYES: PERRLA, there is pallor but no jaundice  ENT/MOUTH: Ears unremarkable. No oral lesions  CVS: s1s2 normal  RESPIRATORY: Chest is clear  GI: Ostomy site is clean.  Venting G-tube site is clean as well.  Abdomen is firm especially around the ostomy site.  There is minimal tenderness especially in the epigastrium.  NEURO: Alert and oriented ×3  INTEGUMENT: no concerning skin rashes   LYMPHATIC: no palpable lymphadenopathy  MUSCULOSKELETAL: Unremarkable. No bony tenderness.   EXTREMITIES: no pedal edema  PSYCH: Mentation, mood and affect are appropriate            Laboratory/Imaging Studies    Results for KARINA MINER (MRN [id number]) as of 8/9/2018 16:02   Ref. Range 8/9/2018 14:26   Sodium Latest Ref Range: 133 - 144 mmol/L 136   Potassium Latest Ref Range: 3.4 - 5.3 mmol/L 4.2   Chloride Latest Ref Range: 94 - 109 mmol/L 103   Carbon Dioxide Latest Ref Range: 20 - 32 mmol/L 25   Urea Nitrogen Latest Ref Range: 7 - 30 mg/dL 25   Creatinine Latest Ref Range: 0.66 - 1.25 mg/dL 0.72   GFR Estimate Latest Ref Range: >60 mL/min/1.7m2 >90   GFR Estimate If Black Latest Ref Range: >60 mL/min/1.7m2 >90   Calcium Latest Ref Range: 8.5 - 10.1 mg/dL 8.6   Anion Gap Latest Ref Range: 3 - 14 mmol/L 8   Magnesium Latest Ref Range: 1.6 - 2.3 mg/dL 1.8   Phosphorus " Latest Ref Range: 2.5 - 4.5 mg/dL 2.8   Albumin Latest Ref Range: 3.4 - 5.0 g/dL 2.5 (L)   Protein Total Latest Ref Range: 6.8 - 8.8 g/dL 7.4   Bilirubin Total Latest Ref Range: 0.2 - 1.3 mg/dL 0.4   Alkaline Phosphatase Latest Ref Range: 40 - 150 U/L 151 (H)   ALT Latest Ref Range: 0 - 70 U/L 33   AST Latest Ref Range: 0 - 45 U/L 29   Glucose Latest Ref Range: 70 - 99 mg/dL 87   WBC Latest Ref Range: 4.0 - 11.0 10e9/L 9.1   Hemoglobin Latest Ref Range: 13.3 - 17.7 g/dL 8.8 (L)   Hematocrit Latest Ref Range: 40.0 - 53.0 % 27.7 (L)   Platelet Count Latest Ref Range: 150 - 450 10e9/L 453 (H)   RBC Count Latest Ref Range: 4.4 - 5.9 10e12/L 3.19 (L)   MCV Latest Ref Range: 78 - 100 fl 87   MCH Latest Ref Range: 26.5 - 33.0 pg 27.6   MCHC Latest Ref Range: 31.5 - 36.5 g/dL 31.8   RDW Latest Ref Range: 10.0 - 15.0 % 17.9 (H)   Diff Method Unknown Automated Method   % Neutrophils Latest Units: % 58.7   % Lymphocytes Latest Units: % 28.1   % Monocytes Latest Units: % 11.5   % Eosinophils Latest Units: % 1.2   % Basophils Latest Units: % 0.2   % Immature Granulocytes Latest Units: % 0.3   Nucleated RBCs Latest Ref Range: 0 /100 0   Absolute Neutrophil Latest Ref Range: 1.6 - 8.3 10e9/L 5.3   Absolute Lymphocytes Latest Ref Range: 0.8 - 5.3 10e9/L 2.6   Absolute Monocytes Latest Ref Range: 0.0 - 1.3 10e9/L 1.0   Absolute Eosinophils Latest Ref Range: 0.0 - 0.7 10e9/L 0.1   Absolute Basophils Latest Ref Range: 0.0 - 0.2 10e9/L 0.0   Abs Immature Granulocytes Latest Ref Range: 0 - 0.4 10e9/L 0.0   Absolute Nucleated RBC Unknown 0.0     EXAMINATION: CT ABDOMEN PELVIS W/O CONTRAST, 7/24/2018 4:27 PM     TECHNIQUE:  Helical CT images from the thoracic inlet through the  symphysis pubis were obtained  with contrast.     CONTRAST DOSE: Only oral contrast was used for this study     COMPARISON: CT 7/18/2018, 6/27/2018, 5/3/2018     HISTORY: 52 yo male with stage IV sigmoid adenocarcinoma with  peritoneal metastasis; Cancer of sigmoid  colon (H)     FINDINGS:     Chest:   Mild bibasilar atelectasis.     Abdomen and pelvis:  Limited evaluation secondary to lack of IV contrast.     Again seen implants along the hepatic capsule. No focal hepatic  lesion. Unremarkable gallbladder. The spleen and adrenals are within  normal limits. Atrophic pancreas. Unchanged noncontrast appearance of  the exophytic left renal cysts.No hydronephrosis or renal calculus.  Unremarkable urinary bladder.     Redemonstration of extensive peritoneal carcinomatosis, which appears  to have not significantly changed since the CT 7/3/2018, fine details  are difficult to discern on this noncontrast study.     Again seen two in situ colonic stent. Interval placement of new third  stent which predominantly is in the sigmoid colon. Postsurgical  changes of distal small bowel resection in the right mid abdomen and  right lower quadrant ileostomy.      Interval increased dilatation of the stomach and proximal small bowel  which measure up to 5 cm. Decompressed loops of bowel in the pelvis,  best appreciated on the coronal images. The transition point is left  lower quadrant  difficult to exactly identify secondary to lack of IV  contrast.      Decreased fluid in the colon.      No ascites. No pneumatosis, portal venous gas or free air. No  abdominopelvic lymphadenopathy.     Bones and soft tissues:  Postsurgical changes of left total hip arthroplasty. Lucent foci in  the iliac bones. Chronic deformities is of the multiple left ribs.          IMPRESSION:   In this patient with history of sigmoid adenocarcinoma with extensive  peritoneal carcinomatosis.  1. New dilated loops of small bowel measuring up to 5 cm with upstream  fluid retention in the stomach. The transition point is in the left  lower quadrant. Precise anatomy is difficult to discern due to lack of  IV contrast. Findings consistent with small bowel obstruction. No  evidence of perforation or significant mesenteric  stranding.  2. New third colonic stent on the already existing colonic stents.  Decreased fluid in the colon.  3. Poorly characterized, not significantly changed sigmoid colon mass  and peritoneal carcinomatosis.         EXAMINATION: CT CHEST/ABDOMEN/PELVIS W CONTRAST, 6/27/2018 11:19 AM     TECHNIQUE:  Helical CT images from the thoracic inlet through the  symphysis pubis were obtained  with contrast.     CONTRAST DOSE: Isovue 370  95 mls     COMPARISON: CT 5/3/2018, 2/20/2018, 2/5/2018     HISTORY: follow up of colon cancer; Cancer of sigmoid colon (H)     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 Status  post expiratory laparotomy and and ileostomy along with small bowel  resection, on FOLFOX palliative chemotherapy,  FINDINGS:     Chest:   Right arm PICC tip projects at the caval atrial junction.     Heart size is within normal limits. No pericardial effusion. The  pulmonary artery and thoracic aorta are within normal limits.      No mediastinal and perihilar lymphadenopathy.      Central tracheobronchial tree is patent. Unchanged tiny right upper  lobe calcified nodule (4 image 27). No pleural effusion or  pneumothorax.     Abdomen and pelvis:  No significantly changed hepatic scalloping measuring approximately 7  mm in thickness along the anterior aspect (series 6 image 98).  Unremarkable gallbladder.  The spleen and adrenals are within normal  limits. Unremarkable pancreas. Unchanged bilateral renal cysts. No  hydronephrosis or renal calculus. Unremarkable urinary bladder.     Again seen sigmoid colonic stent. Increased proximal colonic fluid   with colon measuring up to 6 cm in maximal diameter . Increased post  stent fluid in the rectum as well. Partial small bowel resection with  right lower quadrant ileostomy. Again seen wall thickening involving  the colon. No significantly changed small bowel thickening     Slight interval increase in extent of diffuse peritoneal disease  with  extensive peritoneal implants, for example near complete obliteration  of the pelvic fat planes on the series 3 image 490, and 436. Lower  anterior confluent ill-defined peritoneal masslike thickening measures  approximately 16.9 x 4.8 cm in maximal diameter (6 image 66).  Redemonstration of the sigmoid colon encasement which is slightly  increased. Again seen omental caking  on deep aspect of the anterior  abdominal mesh, slightly increased.     Bones and soft tissues:  Degenerative changes of the lumbar spine. No suspicious bone lesion is  seen. Old healed right clavicle fracture. Small sclerotic focus in the  posterior lateral right sixth rib, unchanged. Left hip total  arthroplasty. Unchanged lucent focus involving the left iliac bone         IMPRESSION:   In this patient with known history of extensive peritoneal  carcinomatosis associated with sigmoid adenocarcinoma:  1a. Status post sigmoid stent. Increased colonic and rectal fluid  since CT 5/3/2018, favored to represent colitis.  1b. Redemonstration of extensive peritoneal carcinomatosis with near  obliteration of pelvic fat planes. Overall disease has slightly  progressed.  1c. No significant change hepatic scalloping.  2. No evidence of metastasis in the chest.        NGS PANEL COLORECTAL    No mutations were identified in analyzed regions of KRAS, NRAS, BRAF, HRAS, or PIK3CA.       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  The sigmoid carcinoma is obstructing, requiring exploratory laparotomy and end ileostomy along with small bowel resection.    MSI intact and NGS panel does not reveal any identifiable mutations     He has been started on FOLFOX palliative chemotherapy. After 6 cycles, he had stable disease      On 12/6/17, he had Diagnostic laparoscopy and Exploratory laparotomy, but HIPEC was not performed as Peritoneal cancer index was 26 with diffuse involvement of the small bowel as well as the  small bowel mesentery.     Repeat CT scan showed worsening peritoneal carcinomatosis and he was getting more symptomatic in terms of worsening abdominal pain and difficulty eating because of worsening abdominal cramping. He was admitted to the hospital with worsening colon distention and colon stent was placed which improved his symptoms.     He was hospitalized from 2/20-2/25/18 with presumed infectious colitis and bacteremia. His port was removed. He was discharged home on IV vancomycin via PICC line. He completed vancomycin on 3/7/2018.    He resumed FOLFOX (C#7) with dose reduced oxaliplatin (70mg/m2) due to previous neuropathy on 3/8/18. He was hospitalized with a bowel obstruction from 3/18-3/20/18 and a colonic stent was placed on 3/19/18.  C#8 3/22/18 FOLFOX  C#9 4/5/18 FOLFOX        Repeat CT shows slightly improved disease and less ascites now.   Because of progressive fatigue, we stopped the 5-FU bolus and leucovorin and continued with the 5-FU infusion and oxaliplatin.     C#10 5/4/18  C#11 5/18/18  C#13 5/31/18  C#13 6/15/18    Repeat CT scan showed worsening peritoneal disease. There is also increased fluid in the proximal control on as well as post stent fluid in the rectum. This could be consistent with colitis, so I gave him levofloxacin/flagyl and plan was to switch therapy to irinotecan and panitumumab though it has not been started as he was hospitalized on 7/3 for abdominal pain and bloating as well as rectal bleeding. Found to have colitis with increased fluid dilation of the colon. GI did a flex sig and placed a colon stent for the third time. Other stents were found to have ingrowth of tumor.   Again admitted on 7/24 with SBO and PAULINA. Venting G tube placed which helped with N/V.     He is now doing better and we will plan on starting irinotecan and Panitumumab.  Because of his several complications in the recent past I would like him to come back to the clinic 1 week after start of  chemotherapy to be assessed for toxicity and possibly get IV fluids.  I would like to give him at least a couple of months of chemotherapy before repeat imaging.    Abdominal pain due to peritoneal carcinomatosis as well as colonic obstruction.  Continue with fentanyl and Dilaudid PCA.  With this regimen his pain is under very decent control currently.  Continue follow-up with palliative care.  He was evaluated by them today.    Nausea vomiting/nutrition.  His nausea and vomiting significantly improved after placement of venting G-tube.  Currently he is on full liquid diet and he is on TPN.  Continue the same.  Continue Zofran as needed for nausea and vomiting.    Recent acute kidney injury due to dehydration.  This has now resolved.  We discussed that with the start of new chemotherapy regimen there is a chance that he will have more ostomy output as irinotecan can cause diarrhea.  Currently he is not on any antidiarrheals but he has a Imodium as well as Lomotil at home which he can use if he starts to notice worsening ostomy output.  I also advised him to continue the IV fluids at home.  Currently he is getting 500 cc daily.  There is a chance we have to increase the amount of IV fluids once he starts chemotherapy.    Anemia.  This is due to a combination of previous chemotherapy as well as from the cancer causing anemia of chronic disease.  Previously he had evidence of iron deficiency anemia.  I will add iron studies today.    Neuropathy.  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  Currently he denies any neuropathy.  Continue to observe.    Return to clinic 1 week after start of chemotherapy for toxicity check.    All of his questions were answered to his satisfaction.  He is agreeable and comfortable with the plan.      Albert Long MD

## 2018-08-09 NOTE — MR AVS SNAPSHOT
After Visit Summary   8/9/2018    Sebas Lopez    MRN: [id number]           Patient Information     Date Of Birth          1965        Visit Information        Provider Department      8/9/2018 1:30 PM Lorenzo Trujillo MD [facility]        Today's Diagnoses     Cancer of sigmoid colon (H)    -  1    Neoplasm related pain        Colon adenocarcinoma (H)        Peritoneal carcinomatosis (H)           Follow-ups after your visit        Your next 10 appointments already scheduled     Aug 09, 2018  3:30 PM CDT   [facility] Draw with  [facility] DRAW   [facility] Draw ([facility])    [address] [place] [address]  [address]  [facility] [id number] [phone]            Aug 09, 2018  4:00 PM CDT   (Arrive by 3:45 PM)   Return Visit with Albert Long MD   [facility] ([facility])    [address] [place] [address]  [address]  [facility] [id number] [phone]            Aug 15, 2018  5:00 PM CDT   TELEMEDICINE with Sherry Coronel [facility] ([facility])    [address] [place] [address]  [address]  [facility] [address] [phone]           Note: this is not an onsite visit; there is no need to come to the facility.            Aug 22, 2018  2:00 PM CDT   (Arrive by 1:45 PM)   Return Visit with Lore Calix MD   [facility] ([facility])    [address] [place] [address]  [address]  [facility] [id number] [phone]              Who to contact     If you have questions or need follow up information about today's clinic visit or your schedule please contact [facility] directly at [phone].  Normal or non-critical lab and imaging results will be communicated to you by MyChart, letter or phone within 4 business days after the clinic has received the results. If you do  "not hear from us within 7 days, please contact the clinic through [employer] or phone. If you have a critical or abnormal lab result, we will notify you by phone as soon as possible.  Submit refill requests through [employer] or call your pharmacy and they will forward the refill request to us. Please allow 3 business days for your refill to be completed.          Additional Information About Your Visit        [employer] Information     [employer] gives you secure access to your electronic health record. If you see a primary care provider, you can also send messages to your care team and make appointments. If you have questions, please call your primary care clinic.  If you do not have a primary care provider, please call [phone] and they will assist you.        Care EveryWhere ID     This is your Care EveryWhere ID. This could be used by other organizations to access your [facility] medical records  [id number]        Your Vitals Were     Pulse Temperature Respirations Height Pulse Oximetry BMI (Body Mass Index)    89 98.1  F (36.7  C) (Oral) 16 1.753 m (5' 9.02\") 98% 23.38 kg/m2       Blood Pressure from Last 3 Encounters:   08/09/18 116/80   08/06/18 124/85   07/24/18 109/85    Weight from Last 3 Encounters:   08/09/18 71.8 kg (158 lb 6.4 oz)   08/06/18 71.5 kg (157 lb 10.1 oz)   07/24/18 65 kg (143 lb 6.4 oz)              Today, you had the following     No orders found for display         Where to get your medicines      Some of these will need a paper prescription and others can be bought over the counter.  Ask your nurse if you have questions.     Bring a paper prescription for each of these medications     fentaNYL 100 mcg/hr 72 hr patch         Information about OPIOIDS     PRESCRIPTION OPIOIDS: WHAT YOU NEED TO KNOW   We gave you an opioid (narcotic) pain medicine. It is important to manage your pain, but opioids are not always the best choice. You should first try all the other options your care team gave you. " Take this medicine for as short a time (and as few doses) as possible.    Some activities can increase your pain, such as bandage changes or therapy sessions. It may help to take your pain medicine 30 to 60 minutes before these activities. Reduce your stress by getting enough sleep, working on hobbies you enjoy and practicing relaxation or meditation. Talk to your care team about ways to manage your pain beyond prescription opioids.    These medicines have risks:    DO NOT drive when on new or higher doses of pain medicine. These medicines can affect your alertness and reaction times, and you could be arrested for driving under the influence (DUI). If you need to use opioids long-term, talk to your care team about driving.    DO NOT operate heavy machinery    DO NOT do any other dangerous activities while taking these medicines.    DO NOT drink any alcohol while taking these medicines.     If the opioid prescribed includes acetaminophen, DO NOT take with any other medicines that contain acetaminophen. Read all labels carefully. Look for the word  acetaminophen  or  Tylenol.  Ask your pharmacist if you have questions or are unsure.    You can get addicted to pain medicines, especially if you have a history of addiction (chemical, alcohol or substance dependence). Talk to your care team about ways to reduce this risk.    All opioids tend to cause constipation. Drink plenty of water and eat foods that have a lot of fiber, such as fruits, vegetables, prune juice, apple juice and high-fiber cereal. Take a laxative (Miralax, milk of magnesia, Colace, Senna) if you don t move your bowels at least every other day. Other side effects include upset stomach, sleepiness, dizziness, throwing up, tolerance (needing more of the medicine to have the same effect), physical dependence and slowed breathing.    Store your pills in a secure place, locked if possible. We will not replace any lost or stolen medicine. If you don t finish  your medicine, please throw away (dispose) as directed by your pharmacist. The [place] [employer]ution [employer] has more information about safe disposal: [email or web address]         Primary Care Provider Office Phone # Fax #    Jaguar Becker [phone]       [facility] [address]  [facility] [address]        Equal Access to Services     SARAH DUNN : Hadii aad ku hadasho Soomaali, waaxda luqadaha, qaybta kaalmada adeegyada, waxay idiin hayaan adeeg howard laNelanovabrice . So [facility] [phone].    ATENCIÓN: Si habla español, tiene a cid disposición servicios gratuitos de asistencia lingüística. Corinaame al [phone].    We comply with applicable federal civil rights laws and [place] laws. We do not discriminate on the basis of race, color, national origin, age, disability, sex, sexual orientation, or gender identity.            Thank you!     Thank you for choosing [facility]  for your care. Our goal is always to provide you with excellent care. Hearing back from our patients is one way we can continue to improve our services. Please take a few minutes to complete the written survey that you may receive in the mail after your visit with us. Thank you!             Your Updated Medication List - Protect others around you: Learn how to safely use, store and throw away your medicines at www.disposemymeds.org.          This list is accurate as of 8/9/18  2:07 PM.  Always use your most recent med list.                   Brand Name Dispense Instructions for use Diagnosis    dexamethasone 4 MG tablet    DECADRON    20 tablet    Take 1 tablet (4 mg) by mouth daily    Colon adenocarcinoma (H), Neoplasm related pain       * fentaNYL 25 mcg/hr 72 hr patch    DURAGESIC          * FentaNYL 37.5 MCG/HR Pt72           * fentaNYL 100 mcg/hr 72 hr patch    DURAGESIC    30 patch    Place 3 patches onto the skin every 72 hours remove old  patch.    Colon adenocarcinoma (H), Neoplasm related pain, Peritoneal carcinomatosis (H)       LORazepam 0.5 MG tablet    ATIVAN    30 tablet    Take 1 tablet (0.5 mg) by mouth every 4 hours as needed (Anxiety, Nausea/Vomiting or Sleep)    Peritoneal carcinomatosis (H), Cancer of sigmoid colon (H)       naloxone nasal spray    NARCAN    0.2 mL    Spray 1 spray (4 mg) into one nostril alternating nostrils as needed for opioid reversal every 2-3 minutes until assistance arrives    Cancer of sigmoid colon (H), Long term current use of opiate analgesic       ondansetron 8 MG ODT tab    ZOFRAN-ODT    60 tablet    Take 1 tablet (8 mg) by mouth every 8 hours as needed for nausea    Nausea       ondansetron 8 MG tablet    ZOFRAN          order for DME     1 Device    Equipment being ordered: home suction machine with tubing for connection to venting G-tube Treatment Diagnosis: colon adenocarcinoma    Colon adenocarcinoma (H), Small bowel obstruction       pantoprazole 40 MG EC tablet    PROTONIX    30 tablet    Take 1 tablet (40 mg) by mouth every morning (before breakfast)    History of recent steroid use       parenteral nutrition - PTA/DISCHARGE ORDER      Inject into the vein daily [facility]        prochlorperazine 10 MG tablet    COMPAZINE          sodium chloride 0.9% Soln BOLUS     1000 mL    Inject 1,000 mLs into the vein daily as needed For hydration.        * Notice:  This list has 3 medication(s) that are the same as other medications prescribed for you. Read the directions carefully, and ask your doctor or other care provider to review them with you.

## 2018-08-09 NOTE — PROGRESS NOTES
Oncology follow up visit:  Date on this visit: 8/9/2018         CC  sigmoid adenocarcinoma with peritoneal carcinomatosis- MSI- Intact KRAS wild type    Primary Physician: Waylon Han     History Of Present Illness:     Please see previous note for details. I have copied and updated from prior notes.   Sebas is a 53-year-old male who has a history of ulcerative colitis diagnosed more than 20 years ago, but he has not had medical management for that.  He was doing well, but in 05/2017 he presented with a few weeks of abdominal pain.  At that time, his imaging showed that he had a sigmoid wall thickening with adjacent mesenteric lymphadenopathy and free fluid near the abdominal wall mesh from his prior hernia surgery.  He subsequently underwent a colonoscopy which was incomplete and showed an obstructing sigmoid colon mass.  The biopsy from the sigmoid mass showed sigmoid adenocarcinoma.  He then developed obstructive symptoms and underwent exploratory laparotomy on 07/10/2017.  During that he was found to have diffuse peritoneal carcinomatosis.  Extensive lysis of adhesions was performed and a small bowel resection was performed with end ileostomy.  The biopsies from the multiple large peritoneal metastasis also were positive for metastatic adenocarcinoma.      He started palliative FOLFOX on 8/11/17. C#6 given on 10/26/17  After 6 cycles, he has stable disease    On 12/6/17, he had Diagnostic laparoscopy and Exploratory laparotomy, but HIPEC was not performed as Peritoneal cancer index was 26 with diffuse involvement of the small bowel as well as the small bowel mesentery.     He then presented to ED on 1/26 with abdominal pain and associated nausea. CT A/P on 1/26 with 5.2 x 4.5 x 3.6 cm proximal sigmoid mass causnig colonic obstriction with singificant distention of cecum (7cm in diameter), ascending colon and proximal transverse colon. No pneumatosis or pneumoperitoneum. Also small volume of ascites with  associated findings concerning for peritoneal carcinomatosis, increased from prior studies. Dr. Dudley was consulted and recommended no surgical intervention.    He was then seen in clinic on 1/29/2018 as well as yesterday because for the last few days he was unable to tolerate solid foods and he was getting abdominal cramping and some nausea. He also noticed that his ostomy output decreased. He was given IV fluids as well as antibiotics and yesterday he was started on OxyContin 10 mg twice a day along with p.r.n. oxycodone. Of note a few weeks ago he was started on short acting octreotide to decrease the ostomy output but he stopped taking it about one week ago and few days prior to that he noticed worsening of the abdominal cramping.      Repeat CT scan showed worsening peritoneal carcinomatosis and he is getting more symptomatic in terms of worsening abdominal pain and difficulty eating because of worsening abdominal cramping. He was admitted to the hospital with worsening colon distention and colon stent was placed which improved his symptoms.     He was recently hospitalized from 2/20-2/25/18 with presumed infectious colitis and bacteremia. His port was removed. He was discharged home on IV vancomycin via a PICC line which he completed on 3/7/2018.     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 He was hospitalized with a bowel obstruction from 3/18-3/20/18 and a colonic stent was placed on 3/19/18.  C#8 3/22/18 FOLFOX  C#9 4/5/18 FOLFOX      Repeat CT shows slightly improved disease and less ascites now.   Because of progressive fatigue, we stopped the 5-FU bolus and leucovorin and continued with the 5-FU infusion and oxaliplatin.     C#10 5/4/18  C#11 5/18/18  C#13 5/31/18  C#13 6/15/18    Repeat CT scan showed worsening peritoneal disease. There is also increased fluid in the proximal control on as well as post stent fluid in the rectum. This could be  consistent with colitis  I gave him levofloxacin/flagyl and plan was to switch therapy to irinotecan and panitumumab though was hospitalized on 7/3 for abdominal pain and bloating as well as rectal bleeding. Found to have colitis with increased fluid dilation of the colon. GI did a flex sig and placed a colon stent for the third time. Other stents were found to have ingrowth of tumor.   Again admitted on 7/24 with SBO and PAULINA. Venting G tube placed which helped with N/V.    He comes in today and tells me that overall he has been doing well.  It has been 3 days since he went home.  His abdominal pain is under good control.  Currently he is using a fentanyl 300 mcg in addition to Dilaudid PCA.  His nausea is minimal and very occasionally he has to take Zofran.  Ostomy site is working well.  Venting G-tube really helped.  He thinks that the combination of ostomy and a venting G-tube on average puts out 2 L a day.  He is not using anything to decrease the ostomy output currently.  He is on a full liquid diet which for the most part he is tolerating well and occasionally has to use the venting G-tube.  He continues to be on TPN.  He is getting 500 cc IV fluids on a daily basis as well.  No bleeding.  No more fevers or infections.  No trouble breathing.  No new swellings.  Today he denies noticing any neuropathy.  Overall he thinks his energy is slowly improving and he is walking more at home.  He has gained a few pounds.    ECOG 2    ROS:  Rest of the complains of review of the system was essentially unremarkable.      I reviewed her history in Epic as below      Past Medical/Surgical History:  Past Medical History:   Diagnosis Date     Adenocarcinoma of sigmoid colon (H)     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      Ulcerative colitis (H)      Past Surgical History:   Procedure Laterality Date     COLONOSCOPY  2017     COLONOSCOPY N/A 11/30/2017    Procedure: COLONOSCOPY;  Colonoscopy;  Surgeon: Rob Dudley MD;  Location: [facility]     COLONOSCOPY N/A 2/6/2018    Procedure: COMBINED COLONOSCOPY, STENT PLACEMENT;  COMBINED COLONOSCOPY with Colonic Stent Placement;  Surgeon: Guru Lionel Mar MD;  Location: [facility]     COLONOSCOPY N/A 3/19/2018    Procedure: COMBINED COLONOSCOPY, STENT PLACEMENT;  flexible sigmoidoscopy with stent placement and dilation;  Surgeon: Alexis Rios MD;  Location: [facility]     COLONOSCOPY N/A 7/5/2018    Procedure: COMBINED COLONOSCOPY, STENT PLACEMENT;  flexible sigmoidoscopy with colonic stent placement ;  Surgeon: Alexis Rios MD;  Location: [facility]     GI SURGERY  07/10/2017    Extensive lysis of adhesions, small bowel resection with end ileostomy.      HERNIA REPAIR       INSERT PORT VASCULAR ACCESS Right 8/10/2017    Procedure: INSERT PORT VASCULAR ACCESS;  Single Lumen Right Chest Power Port;  Surgeon: Malena Andrew PA-C;  Location: [facility]     LAPAROSCOPY DIAGNOSTIC (GENERAL) N/A 12/6/2017    Procedure: LAPAROSCOPY DIAGNOSTIC (GENERAL);  Diagnostic Laparoscopy, Exploratory Laparotomy Anesthesia Block ;  Surgeon: Rob Dudley MD;  Location: [facility]     LAPAROTOMY EXPLORATORY N/A 12/6/2017    Procedure: LAPAROTOMY EXPLORATORY;;  Surgeon: Rob Dudley MD;  Location: [facility]     ORTHOPEDIC SURGERY Left     HIP ARTHROPLASTY     PICC INSERTION Right 02/23/2018    5Fr DL BioFlo PICC, 44cm (3cm external) in the R basilic vein w/ tip in the SVC RA junction      Ulcerative colitis.  Left hip replacement.       Cancer History:   As above    Allergies:  Allergies as of 08/09/2018 - Julius as Reviewed 08/09/2018   Allergen Reaction Noted     Liquid adhesive Rash 03/01/2018     Current Medications:  Current Outpatient Prescriptions   Medication  Sig Dispense Refill     dexamethasone (DECADRON) 4 MG tablet Take 1 tablet (4 mg) by mouth daily 20 tablet 0     fentaNYL (DURAGESIC) 100 mcg/hr 72 hr patch Place 3 patches onto the skin every 72 hours remove old patch. 30 patch 0     LORazepam (ATIVAN) 0.5 MG tablet Take 1 tablet (0.5 mg) by mouth every 4 hours as needed (Anxiety, Nausea/Vomiting or Sleep) 30 tablet 5     ondansetron (ZOFRAN-ODT) 8 MG ODT tab Take 1 tablet (8 mg) by mouth every 8 hours as needed for nausea 60 tablet 5     pantoprazole (PROTONIX) 40 MG EC tablet Take 1 tablet (40 mg) by mouth every morning (before breakfast) 30 tablet 0     parenteral nutrition - PTA/DISCHARGE ORDER Inject into the vein daily [facility]       prochlorperazine (COMPAZINE) 10 MG tablet        fentaNYL (DURAGESIC) 25 mcg/hr 72 hr patch        FentaNYL 37.5 MCG/HR PT72        naloxone (NARCAN) nasal spray Spray 1 spray (4 mg) into one nostril alternating nostrils as needed for opioid reversal every 2-3 minutes until assistance arrives (Patient not taking: Reported on 8/9/2018) 0.2 mL 0     ondansetron (ZOFRAN) 8 MG tablet        order for DME Equipment being ordered: home suction machine with tubing for connection to venting G-tube  Treatment Diagnosis: colon adenocarcinoma 1 Device 0     sodium chloride 0.9% SOLN BOLUS Inject 1,000 mLs into the vein daily as needed For hydration. 1000 mL 0      Family History:  Family History   Problem Relation Age of Onset     Hypertension Father      Diabetes Father       No family history of cancer.  He does not have any kids.       Social History:  Social History     Social History     Marital status: Single     Spouse name: N/A     Number of children: N/A     Years of education: N/A     Occupational History     Not on file.     Social History Main Topics     Smoking status: Never Smoker     Smokeless tobacco: Never Used     Alcohol use 3.0 oz/week     5 Cans of beer per week      Comment: none for last 4 months     Drug use: No      "Sexual activity: Not on file     Other Topics Concern     Not on file     Social History Narrative   He does not smoke and does not drink.  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  He lives with his girlfriend, Bessie.       Physical Exam:  /80  Pulse 89  Temp 98.1  F (36.7  C) (Oral)  Resp 16  Ht 1.753 m (5' 9\")  Wt 71.7 kg (158 lb)  SpO2 98%  BMI 23.33 kg/m2  CONSTITUTIONAL: No apparent distress  EYES: PERRLA, there is pallor but no jaundice  ENT/MOUTH: Ears unremarkable. No oral lesions  CVS: s1s2 normal  RESPIRATORY: Chest is clear  GI: Ostomy site is clean.  Venting G-tube site is clean as well.  Abdomen is firm especially around the ostomy site.  There is minimal tenderness especially in the epigastrium.  NEURO: Alert and oriented ×3  INTEGUMENT: no concerning skin rashes   LYMPHATIC: no palpable lymphadenopathy  MUSCULOSKELETAL: Unremarkable. No bony tenderness.   EXTREMITIES: no pedal edema  PSYCH: Mentation, mood and affect are appropriate            Laboratory/Imaging Studies    Results for KRAINA MINER (MRN [id number]) as of 8/9/2018 16:02   Ref. Range 8/9/2018 14:26   Sodium Latest Ref Range: 133 - 144 mmol/L 136   Potassium Latest Ref Range: 3.4 - 5.3 mmol/L 4.2   Chloride Latest Ref Range: 94 - 109 mmol/L 103   Carbon Dioxide Latest Ref Range: 20 - 32 mmol/L 25   Urea Nitrogen Latest Ref Range: 7 - 30 mg/dL 25   Creatinine Latest Ref Range: 0.66 - 1.25 mg/dL 0.72   GFR Estimate Latest Ref Range: >60 mL/min/1.7m2 >90   GFR Estimate If Black Latest Ref Range: >60 mL/min/1.7m2 >90   Calcium Latest Ref Range: 8.5 - 10.1 mg/dL 8.6   Anion Gap Latest Ref Range: 3 - 14 mmol/L 8   Magnesium Latest Ref Range: 1.6 - 2.3 mg/dL 1.8   Phosphorus Latest Ref Range: 2.5 - 4.5 mg/dL 2.8   Albumin Latest Ref Range: 3.4 - 5.0 g/dL 2.5 (L)   Protein Total Latest Ref Range: 6.8 - 8.8 g/dL 7.4   Bilirubin Total Latest Ref Range: 0.2 - 1.3 mg/dL 0.4   Alkaline Phosphatase Latest Ref Range: 40 " - 150 U/L 151 (H)   ALT Latest Ref Range: 0 - 70 U/L 33   AST Latest Ref Range: 0 - 45 U/L 29   Glucose Latest Ref Range: 70 - 99 mg/dL 87   WBC Latest Ref Range: 4.0 - 11.0 10e9/L 9.1   Hemoglobin Latest Ref Range: 13.3 - 17.7 g/dL 8.8 (L)   Hematocrit Latest Ref Range: 40.0 - 53.0 % 27.7 (L)   Platelet Count Latest Ref Range: 150 - 450 10e9/L 453 (H)   RBC Count Latest Ref Range: 4.4 - 5.9 10e12/L 3.19 (L)   MCV Latest Ref Range: 78 - 100 fl 87   MCH Latest Ref Range: 26.5 - 33.0 pg 27.6   MCHC Latest Ref Range: 31.5 - 36.5 g/dL 31.8   RDW Latest Ref Range: 10.0 - 15.0 % 17.9 (H)   Diff Method Unknown Automated Method   % Neutrophils Latest Units: % 58.7   % Lymphocytes Latest Units: % 28.1   % Monocytes Latest Units: % 11.5   % Eosinophils Latest Units: % 1.2   % Basophils Latest Units: % 0.2   % Immature Granulocytes Latest Units: % 0.3   Nucleated RBCs Latest Ref Range: 0 /100 0   Absolute Neutrophil Latest Ref Range: 1.6 - 8.3 10e9/L 5.3   Absolute Lymphocytes Latest Ref Range: 0.8 - 5.3 10e9/L 2.6   Absolute Monocytes Latest Ref Range: 0.0 - 1.3 10e9/L 1.0   Absolute Eosinophils Latest Ref Range: 0.0 - 0.7 10e9/L 0.1   Absolute Basophils Latest Ref Range: 0.0 - 0.2 10e9/L 0.0   Abs Immature Granulocytes Latest Ref Range: 0 - 0.4 10e9/L 0.0   Absolute Nucleated RBC Unknown 0.0     EXAMINATION: CT ABDOMEN PELVIS W/O CONTRAST, 7/24/2018 4:27 PM     TECHNIQUE:  Helical CT images from the thoracic inlet through the  symphysis pubis were obtained  with contrast.     CONTRAST DOSE: Only oral contrast was used for this study     COMPARISON: CT 7/18/2018, 6/27/2018, 5/3/2018     HISTORY: 52 yo male with stage IV sigmoid adenocarcinoma with  peritoneal metastasis; Cancer of sigmoid colon (H)     FINDINGS:     Chest:   Mild bibasilar atelectasis.     Abdomen and pelvis:  Limited evaluation secondary to lack of IV contrast.     Again seen implants along the hepatic capsule. No focal hepatic  lesion. Unremarkable  gallbladder. The spleen and adrenals are within  normal limits. Atrophic pancreas. Unchanged noncontrast appearance of  the exophytic left renal cysts.No hydronephrosis or renal calculus.  Unremarkable urinary bladder.     Redemonstration of extensive peritoneal carcinomatosis, which appears  to have not significantly changed since the CT 7/3/2018, fine details  are difficult to discern on this noncontrast study.     Again seen two in situ colonic stent. Interval placement of new third  stent which predominantly is in the sigmoid colon. Postsurgical  changes of distal small bowel resection in the right mid abdomen and  right lower quadrant ileostomy.      Interval increased dilatation of the stomach and proximal small bowel  which measure up to 5 cm. Decompressed loops of bowel in the pelvis,  best appreciated on the coronal images. The transition point is left  lower quadrant  difficult to exactly identify secondary to lack of IV  contrast.      Decreased fluid in the colon.      No ascites. No pneumatosis, portal venous gas or free air. No  abdominopelvic lymphadenopathy.     Bones and soft tissues:  Postsurgical changes of left total hip arthroplasty. Lucent foci in  the iliac bones. Chronic deformities is of the multiple left ribs.          IMPRESSION:   In this patient with history of sigmoid adenocarcinoma with extensive  peritoneal carcinomatosis.  1. New dilated loops of small bowel measuring up to 5 cm with upstream  fluid retention in the stomach. The transition point is in the left  lower quadrant. Precise anatomy is difficult to discern due to lack of  IV contrast. Findings consistent with small bowel obstruction. No  evidence of perforation or significant mesenteric stranding.  2. New third colonic stent on the already existing colonic stents.  Decreased fluid in the colon.  3. Poorly characterized, not significantly changed sigmoid colon mass  and peritoneal carcinomatosis.         EXAMINATION: CT  CHEST/ABDOMEN/PELVIS W CONTRAST, 6/27/2018 11:19 AM     TECHNIQUE:  Helical CT images from the thoracic inlet through the  symphysis pubis were obtained  with contrast.     CONTRAST DOSE: Isovue 370  95 mls     COMPARISON: CT 5/3/2018, 2/20/2018, 2/5/2018     HISTORY: follow up of colon cancer; Cancer of sigmoid colon (H)     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 Status  post expiratory laparotomy and and ileostomy along with small bowel  resection, on FOLFOX palliative chemotherapy,  FINDINGS:     Chest:   Right arm PICC tip projects at the caval atrial junction.     Heart size is within normal limits. No pericardial effusion. The  pulmonary artery and thoracic aorta are within normal limits.      No mediastinal and perihilar lymphadenopathy.      Central tracheobronchial tree is patent. Unchanged tiny right upper  lobe calcified nodule (4 image 27). No pleural effusion or  pneumothorax.     Abdomen and pelvis:  No significantly changed hepatic scalloping measuring approximately 7  mm in thickness along the anterior aspect (series 6 image 98).  Unremarkable gallbladder.  The spleen and adrenals are within normal  limits. Unremarkable pancreas. Unchanged bilateral renal cysts. No  hydronephrosis or renal calculus. Unremarkable urinary bladder.     Again seen sigmoid colonic stent. Increased proximal colonic fluid   with colon measuring up to 6 cm in maximal diameter . Increased post  stent fluid in the rectum as well. Partial small bowel resection with  right lower quadrant ileostomy. Again seen wall thickening involving  the colon. No significantly changed small bowel thickening     Slight interval increase in extent of diffuse peritoneal disease with  extensive peritoneal implants, for example near complete obliteration  of the pelvic fat planes on the series 3 image 490, and 436. Lower  anterior confluent ill-defined peritoneal masslike thickening measures  approximately 16.9 x 4.8 cm in  maximal diameter (6 image 66).  Redemonstration of the sigmoid colon encasement which is slightly  increased. Again seen omental caking  on deep aspect of the anterior  abdominal mesh, slightly increased.     Bones and soft tissues:  Degenerative changes of the lumbar spine. No suspicious bone lesion is  seen. Old healed right clavicle fracture. Small sclerotic focus in the  posterior lateral right sixth rib, unchanged. Left hip total  arthroplasty. Unchanged lucent focus involving the left iliac bone         IMPRESSION:   In this patient with known history of extensive peritoneal  carcinomatosis associated with sigmoid adenocarcinoma:  1a. Status post sigmoid stent. Increased colonic and rectal fluid  since CT 5/3/2018, favored to represent colitis.  1b. Redemonstration of extensive peritoneal carcinomatosis with near  obliteration of pelvic fat planes. Overall disease has slightly  progressed.  1c. No significant change hepatic scalloping.  2. No evidence of metastasis in the chest.        NGS PANEL COLORECTAL    No mutations were identified in analyzed regions of KRAS, NRAS, BRAF, HRAS, or PIK3CA.       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  The sigmoid carcinoma is obstructing, requiring exploratory laparotomy and end ileostomy along with small bowel resection.    MSI intact and NGS panel does not reveal any identifiable mutations     He has been started on FOLFOX palliative chemotherapy. After 6 cycles, he had stable disease      On 12/6/17, he had Diagnostic laparoscopy and Exploratory laparotomy, but HIPEC was not performed as Peritoneal cancer index was 26 with diffuse involvement of the small bowel as well as the small bowel mesentery.     Repeat CT scan showed worsening peritoneal carcinomatosis and he was getting more symptomatic in terms of worsening abdominal pain and difficulty eating because of worsening abdominal cramping. He was admitted to the  hospital with worsening colon distention and colon stent was placed which improved his symptoms.     He was hospitalized from 2/20-2/25/18 with presumed infectious colitis and bacteremia. His port was removed. He was discharged home on IV vancomycin via PICC line. He completed vancomycin on 3/7/2018.    He resumed FOLFOX (C#7) with dose reduced oxaliplatin (70mg/m2) due to previous neuropathy on 3/8/18. He was hospitalized with a bowel obstruction from 3/18-3/20/18 and a colonic stent was placed on 3/19/18.  C#8 3/22/18 FOLFOX  C#9 4/5/18 FOLFOX        Repeat CT shows slightly improved disease and less ascites now.   Because of progressive fatigue, we stopped the 5-FU bolus and leucovorin and continued with the 5-FU infusion and oxaliplatin.     C#10 5/4/18  C#11 5/18/18  C#13 5/31/18  C#13 6/15/18    Repeat CT scan showed worsening peritoneal disease. There is also increased fluid in the proximal control on as well as post stent fluid in the rectum. This could be consistent with colitis, so I gave him levofloxacin/flagyl and plan was to switch therapy to irinotecan and panitumumab though it has not been started as he was hospitalized on 7/3 for abdominal pain and bloating as well as rectal bleeding. Found to have colitis with increased fluid dilation of the colon. GI did a flex sig and placed a colon stent for the third time. Other stents were found to have ingrowth of tumor.   Again admitted on 7/24 with SBO and PAULINA. Venting G tube placed which helped with N/V.     He is now doing better and we will plan on starting irinotecan and Panitumumab.  Because of his several complications in the recent past I would like him to come back to the clinic 1 week after start of chemotherapy to be assessed for toxicity and possibly get IV fluids.  I would like to give him at least a couple of months of chemotherapy before repeat imaging.    Abdominal pain due to peritoneal carcinomatosis as well as colonic obstruction.  Continue  with fentanyl and Dilaudid PCA.  With this regimen his pain is under very decent control currently.  Continue follow-up with palliative care.  He was evaluated by them today.    Nausea vomiting/nutrition.  His nausea and vomiting significantly improved after placement of venting G-tube.  Currently he is on full liquid diet and he is on TPN.  Continue the same.  Continue Zofran as needed for nausea and vomiting.    Recent acute kidney injury due to dehydration.  This has now resolved.  We discussed that with the start of new chemotherapy regimen there is a chance that he will have more ostomy output as irinotecan can cause diarrhea.  Currently he is not on any antidiarrheals but he has a Imodium as well as Lomotil at home which he can use if he starts to notice worsening ostomy output.  I also advised him to continue the IV fluids at home.  Currently he is getting 500 cc daily.  There is a chance we have to increase the amount of IV fluids once he starts chemotherapy.    Anemia.  This is due to a combination of previous chemotherapy as well as from the cancer causing anemia of chronic disease.  Previously he had evidence of iron deficiency anemia.  I will add iron studies today.    Neuropathy.  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  Currently he denies any neuropathy.  Continue to observe.    Return to clinic 1 week after start of chemotherapy for toxicity check.    All of his questions were answered to his satisfaction.  He is agreeable and comfortable with the plan.    Albert Long

## 2018-08-09 NOTE — PATIENT INSTRUCTIONS
SCHEDULE IRINOTECAN AND PANITUMUMAB NEXT WEEK    1 WEEK AFTER THAT SEE [facility] AND POSSIBLE INFUSION ROOM APPOINTMENT FOR IVF

## 2018-08-09 NOTE — LETTER
8/9/2018       RE: Sebas Lopez  [address] [place] [address]     Dear Colleague,    Thank you for referring your patient, Sebas Lopez, to the [facility] at [facility]. Please see a copy of my visit note below.    Palliative Staff/Outpatient Clinic    (This note was transcribed using voice recognition software. While I review and edit the transcription, I may miss errors, and the software sometimes does unexpected capitalizations and formatting that I miss. 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  Seen by Dr. Mota, palliative fellow last year.     Recurrent bowel obstructions s/p multiple stentings. As of Aug 2018 has a venting G tube, is on TPN, and a home PCA for pain control.     [facility] Outpatient Palliative Care Opioid Prescribing Safety Plan     Opioid Safety Education: Reviewed by Lorenzo Trujillo on 07/20/18  Opioid Risk Assessment: Performed byLorenzo Trujillo on 07/20/18. ORT 0  Mood Disorder Assessment: Performed byLorenzo Trujillo on 07/20/18   reviewed with every prescription, last reviewed by Lorenzo Trujillo on 07/20/2018      Expected prognosis: <1 year  Risk: Low or Medium (ORT 0-7)  No further recommendations.    History:  I communicate with our inpatient team, reviewed the chart, and confirmed key details of his recent history with him today.  He was hospitalized for nearly a couple weeks with another bowel obstruction which was refractory and he ended up needing a venting G-tube.  Pain was very severe and we ended up sending him home on 300 mcg/h transdermal fentanyl and a home Dilaudid PCA (0.9 mg bolus dose, no continuous).  He remains on TPN.  He is meeting with his oncologist today to discuss next steps-it is anticipation that new  "systemic therapy will be offered.    Overall he is doing pretty well-she has been home 3 days now.  It sounds like his ileostomy is draining a little more.  He is taking a full liquid diet and is not feeling bloated afterwards and can clamp his G-tube for a while.  He is not totally clear with me how often he is draining his G-tube and how much-I do not think he really pays attention to it- but he can certainly have at least 8 ounces by mouth and not feel bloated, nor need to drain his tube quickly afterwards.    Overall he feels his pain is very well controlled.  I reinterrogate his PCA-he is taken 60 doses of his PCA and about 72 hours.  Denies any side effects including itching nausea or cognitive side effects.  He feels awake and alert.    He says his mood and spirits are good.  He is hopeful for more chemotherapy and feels motivated to continue active treatment.  Things are going okay at home.  He is receiving home nursing and home infusion.    SH: Reviewed and unchanged    ROS: Comprehensive review of systems is negative otherwise    PE: /80 (BP Location: Left arm, Patient Position: Sitting, Cuff Size: Adult Regular)  Pulse 89  Temp 98.1  F (36.7  C) (Oral)  Resp 16  Ht 1.753 m (5' 9.02\")  Wt 71.8 kg (158 lb 6.4 oz)  SpO2 98%  BMI 23.38 kg/m2   Wt Readings from Last 3 Encounters:   08/09/18 71.8 kg (158 lb 6.4 oz)   08/06/18 71.5 kg (157 lb 10.1 oz)   07/24/18 65 kg (143 lb 6.4 oz)     Alert, comfortable appearing, NAD.   Head NCAT.  Eyes anicteric without injection  Face symmetric, eyes conjugate  Mouth pink, moist, no lesions.  Neck supple without masses, thyromegaly, LAD  Lungs unlabored, CTAB  CV rrr s1s2  Abd soft, ntnd, benign. G tube in place. R ileostomy with liquid yellow brown stool.  Back well aligned, no masses, tenderness  RUE PICC c/d/i   No LE edema  Skin warm, dry, without lesions  MSK joints of hand normal; diffuse mild sarcopenia  Neuro Face symmetric,   Neuropsych exam normal " including affect, sensorium, gross memory, thought processes, and fund of knowledge.     Impression & Recommendations:  53-year-old man with metastatic sigmoid adenocarcinoma causing recurrent bowel obstructions who is now receiving TPN, uses a venting G-tube, and has a home PCA.  He is clearly doing much better since he has been home.  I do wonder if his bowel obstruction is easing up a bit and it seems that at least intermittently he is passing orally ingested liquids through.  We discussed this.  He is alert and is not having side effects from his opiates and appears to be using them safety.  We discussed opioid safety including safe  disposal of fentanyl patches and   His old oxycodone.  Answered his questions.  We will see him in 2 weeks and keep a close eye on him given the new opiate plan  which involves very high doses right now.    Chart data reviewed today:      Family History   Problem Relation Age of Onset     Hypertension Father      Diabetes Father      Past Medical History:   Diagnosis Date     Adenocarcinoma of sigmoid colon (H)     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     Ulcerative colitis (H)      Past Surgical History:   Procedure Laterality Date     COLONOSCOPY  2017     COLONOSCOPY N/A 11/30/2017    Procedure: COLONOSCOPY;  Colonoscopy;  Surgeon: Rob Dudley MD;  Location: [facility]     COLONOSCOPY N/A 2/6/2018    Procedure: COMBINED COLONOSCOPY, STENT PLACEMENT;  COMBINED COLONOSCOPY with Colonic Stent Placement;  Surgeon: Guru Lionel Mar MD;  Location: [facility] OR     COLONOSCOPY N/A 3/19/2018    Procedure: COMBINED COLONOSCOPY, STENT PLACEMENT;  flexible sigmoidoscopy with stent placement and dilation;  Surgeon: Alexis Rios MD;  Location: [facility]      COLONOSCOPY N/A 7/5/2018    Procedure: COMBINED COLONOSCOPY, STENT PLACEMENT;  flexible sigmoidoscopy with colonic stent placement ;  Surgeon: Alexis Rios MD;  Location: [facility] OR     GI SURGERY  07/10/2017    Extensive lysis of adhesions, small bowel resection with end ileostomy.      HERNIA REPAIR       INSERT PORT VASCULAR ACCESS Right 8/10/2017    Procedure: INSERT PORT VASCULAR ACCESS;  Single Lumen Right Chest Power Port;  Surgeon: Malena Andrew PA-C;  [facility]     LAPAROSCOPY DIAGNOSTIC (GENERAL) N/A 12/6/2017    Procedure: LAPAROSCOPY DIAGNOSTIC (GENERAL);  Diagnostic Laparoscopy, Exploratory Laparotomy Anesthesia Block ;  Surgeon: Rob Dudley MD;  Location: [facility]     LAPAROTOMY EXPLORATORY N/A 12/6/2017    Procedure: LAPAROTOMY EXPLORATORY;;  Surgeon: Rob Dudley MD;  Location: [facility]     ORTHOPEDIC SURGERY Left     HIP ARTHROPLASTY     PICC INSERTION Right 02/23/2018    5Fr DL BioFlo PICC, 44cm (3cm external) in the R basilic vein w/ tip in the SVC RA junction     Allergies   Allergen Reactions     Liquid Adhesive Rash     Dermabond, rash with pustules, occurred with abdominal surgery and port removal    Medications: I have reviewed the patient's medication profile. MNPMP: reviewed    Data reviewed:  I reviewed recent labs and imaging, my comments:  Cr stable/labs good      Again, thank you for allowing me to participate in the care of your patient.      Sincerely,    Lorenzo Trujillo MD

## 2018-08-13 NOTE — PROGRESS NOTES
This is a recent snapshot of the patient's [facility] medical record.  For current drug dose and complete information and questions, call [phone] or In Basket pool, fv home infusion ([id number])  CSN Number:  [id number]

## 2018-08-15 NOTE — MR AVS SNAPSHOT
After Visit Summary   8/15/2018    Sebas Lopez    MRN: [id number]           Patient Information     Date Of Birth          1965        Visit Information        Provider Department      8/15/2018 5:00 PM [facility]        Today's Diagnoses     Colon adenocarcinoma (H)    -  1    Cancer associated pain        Nausea        Gastroesophageal reflux disease, esophagitis presence not specified          Care Instructions    Recommendations from today's MTM visit:                                                    MTM (medication therapy management) is a service provided by a clinical pharmacist designed to help you get the most of out of your medicines.   Today we reviewed what your medicines are for, how to know if they are working, that your medicines are safe and how to make your medicine regimen as easy as possible.     1. Try to clamp the G-tube for 1-2 hours after each oral medication.    Next MTM visit: Wednesday 9/12/18 at 8am by PHONE. We can reschedule this if needed.    To schedule another MTM appointment, please call the clinic directly or you may call the MTM scheduling line at [phone] or toll-free at [phone].     My Clinical Pharmacist's contact information:                                                      It was a pleasure seeing you today!  Please feel free to contact me with any questions or concerns you have.      Sherry Coronel, PharmD, BCOP, BCPS  Clinical Pharmacy Specialist/  Oncology Medication Therapy Management Pharmacist  [facility]  Pager [phone]  Phone [phone]          You may receive a survey about the MTM services you received.  I would appreciate your feedback to help me serve you better in the future. Please fill it out and return it when you can. Your comments will be anonymous.                Follow-ups after your visit        Your next 10 appointments already scheduled     Aug 22,  2018  2:00 PM CDT   (Arrive by 1:45 PM)   Return Visit with Lore Calix MD   [facility] ([facility])    [address] [place] [address]  [address]  [facility] [id number] [phone]            Aug 24, 2018  8:30 AM CDT   [facility] Draw with  [facility] DRAW   [facility] ([facility])    [address] [place] [address]  [address]  [facility] [id number] [phone]            Aug 24, 2018  9:00 AM CDT   (Arrive by 8:45 AM)   Return Visit with ANNAMARIE Espinosa   [facility] ([facility])    [address] [place] [address]  [address]  [facility] [address] [phone]              Who to contact     If you have questions or need follow up information about today's clinic visit or your schedule please contact [facility] MEDICATION THERAPY MANAGEMENT directly at [phone].  Normal or non-critical lab and imaging results will be communicated to you by [employer], letter or phone within 4 business days after the clinic has received the results. If you do not hear from us within 7 days, please contact the clinic through [employer] or phone. If you have a critical or abnormal lab result, we will notify you by phone as soon as possible.  Submit refill requests through [employer] or call your pharmacy and they will forward the refill request to us. Please allow 3 business days for your refill to be completed.          Additional Information About Your Visit        [employer] Information     [employer] gives you secure access to your electronic health record. If you see a primary care provider, you can also send messages to your care team and make appointments. If you have questions, please call your primary care clinic.  If you do not have a primary care provider, please call [phone] and they will assist you.        Care EveryWhere ID     This is your Care EveryWhere ID. This could be used by other  organizations to access your [place] medical records  [id number]         Blood Pressure from Last 3 Encounters:   08/17/18 118/80   08/09/18 116/80   08/09/18 116/80    Weight from Last 3 Encounters:   08/17/18 154 lb 8.7 oz (70.1 kg)   08/09/18 158 lb (71.7 kg)   08/09/18 158 lb 6.4 oz (71.8 kg)              Today, you had the following     No orders found for display         Today's Medication Changes          These changes are accurate as of 8/15/18 11:59 PM.  If you have any questions, ask your nurse or doctor.               These medicines have changed or have updated prescriptions.        Dose/Directions    fentaNYL 100 mcg/hr 72 hr patch   Commonly known as:  DURAGESIC   This may have changed:  Another medication with the same name was removed. Continue taking this medication, and follow the directions you see here.   Used for:  Colon adenocarcinoma (H), Neoplasm related pain, Peritoneal carcinomatosis (H)   Changed by:  Sherry Coronel [facility]        Dose:  3 patch   Place 3 patches onto the skin every 72 hours remove old patch.   Quantity:  30 patch   Refills:  0         Stop taking these medicines if you haven't already. Please contact your care team if you have questions.     ondansetron 8 MG tablet   Commonly known as:  ZOFRAN   Stopped by:  Sherry Coronel [facility]                    Primary Care Provider Office Phone # Fax #    Jaguar Becker [phone]       [place] [facility] [address]        Equal Access to Services     SARAH DUNN AH: Hadii karrie ku hadasho Soomaali, waaxda luqadaha, qaybta kaalmada adeegyada, waxay stella sage . So [facility] [phone].    ATENCIÓN: Si habla español, tiene a cid disposición servicios gratuitos de asistencia lingüística. Llame al [phone].    We comply with applicable federal civil rights laws and [place] laws. We do not discriminate on the basis of race, color, national origin, age, disability, sex, sexual  orientation, or gender identity.            Thank you!     Thank you for choosing [facility] MEDICATION THERAPY MANAGEMENT  for your care. Our goal is always to provide you with excellent care. Hearing back from our patients is one way we can continue to improve our services. Please take a few minutes to complete the written survey that you may receive in the mail after your visit with us. Thank you!             Your Updated Medication List - Protect others around you: Learn how to safely use, store and throw away your medicines at www.disposemymeds.org.          This list is accurate as of 8/15/18 11:59 PM.  Always use your most recent med list.                   Brand Name Dispense Instructions for use Diagnosis    dexamethasone 4 MG tablet    DECADRON    20 tablet    Take 1 tablet (4 mg) by mouth daily    Colon adenocarcinoma (H), Neoplasm related pain       fentaNYL 100 mcg/hr 72 hr patch    DURAGESIC    30 patch    Place 3 patches onto the skin every 72 hours remove old patch.    Colon adenocarcinoma (H), Neoplasm related pain, Peritoneal carcinomatosis (H)       * hydromorphone HCl 500 MG/50ML Soln     25 mL    Dilute 250mg in 250ml NS  0.9mg IV every 10 mins as needed  Dispense 1 bag (250mg = 25ml)    Neoplasm related pain       * hydromorphone HCl 500 MG/50ML Soln     25 mL    Dilute 250mg in 250ml NS  0.9mg IV every 10 mins as needed  Dispense 1 bag (250mg = 25ml)    Neoplasm related pain       * hydromorphone HCl 500 MG/50ML Soln   Start taking on:  8/21/2018     25 mL    Dilute 250mg in 250ml NS  0.9mg IV every 10 mins as needed  Dispense 1 bag (250mg = 25ml)    Neoplasm related pain       * hydromorphone HCl 500 MG/50ML Soln   Start taking on:  8/28/2018     25 mL    Dilute 250mg in 250ml NS  0.9mg IV every 10 mins as needed  Dispense 1 bag (250mg = 25ml)    Neoplasm related pain       LORazepam 0.5 MG tablet    ATIVAN    30 tablet    Take 1 tablet (0.5 mg) by mouth every 4 hours as needed (Anxiety,  Nausea/Vomiting or Sleep)    Peritoneal carcinomatosis (H), Cancer of sigmoid colon (H)       naloxone nasal spray    NARCAN    0.2 mL    Spray 1 spray (4 mg) into one nostril alternating nostrils as needed for opioid reversal every 2-3 minutes until assistance arrives    Cancer of sigmoid colon (H), Long term current use of opiate analgesic       ondansetron 8 MG ODT tab    ZOFRAN-ODT    60 tablet    Take 1 tablet (8 mg) by mouth every 8 hours as needed for nausea    Nausea       order for DME     1 Device    Equipment being ordered: home suction machine with tubing for connection to venting G-tube Treatment Diagnosis: colon adenocarcinoma    Colon adenocarcinoma (H), Small bowel obstruction       pantoprazole 40 MG EC tablet    PROTONIX    30 tablet    Take 1 tablet (40 mg) by mouth every morning (before breakfast)    History of recent steroid use       parenteral nutrition - PTA/DISCHARGE ORDER      Inject into the vein daily [facility]        prochlorperazine 10 MG tablet    COMPAZINE     10 mg every 6 hours as needed        sodium chloride 0.9% Soln BOLUS     1000 mL    Inject 1,000 mLs into the vein daily as needed For hydration.        * Notice:  This list has 4 medication(s) that are the same as other medications prescribed for you. Read the directions carefully, and ask your doctor or other care provider to review them with you.

## 2018-08-16 NOTE — PROGRESS NOTES
This is a recent snapshot of the patient's [facility] medical record.  For current drug dose and complete information and questions, call [phone] or In Basket pool, fv home infusion ([id number])  CSN Number:  [id number]

## 2018-08-17 NOTE — PROGRESS NOTES
"Infusion Nursing Note:  Sebas Lopez presents today for C1 D1 Vectibix and Irinotecan.    Patient seen by provider today: No   present during visit today: Not Applicable.    Note: Labs drawn locally on 8/14 and are WNL. See under 'care everywhere\" section.     Intravenous Access:  PICC.    Treatment Conditions:  Results reviewed, labs MET treatment parameters, ok to proceed with treatment.      Post Infusion Assessment:  Patient tolerated infusion without incident.  Blood return noted pre and post infusion.  Site patent and intact, free from redness, edema or discomfort.  No evidence of extravasations.    Discharge Plan:   Patient declined prescription refills.  Discharge instructions reviewed with: Patient.  Copy of AVS reviewed with patient and/or family.  Patient will return 8/31/2018 for next appointment.  Patient discharged in stable condition accompanied by: self.  Departure Mode: Ambulatory.    Lisandra Ng RN                        "

## 2018-08-17 NOTE — PROGRESS NOTES
"SUBJECTIVE/OBJECTIVE:                Sebas Lopez is a 53 year old male called for a transitions of care visit.  He was discharged from [facility] on 8/6/18 for bowel obstruction.     Chief Complaint: medication review.    The primary oncologist for this patient is Dr Long.  The PCP for this patient is Dr Jaguar Becker.    Cancer diagnosis: Colon cancer    Allergies/ADRs: None  Tobacco: No tobacco use   Alcohol: none    PMH: Reviewed in Epic    Medication Adherence/Access:  Patient uses pill box(es), and medication chart.  Per patient, misses medication 0 times per week.     Of note, patient has a venting G-tube, that he hasn't always been clamping after PO medication administration.  He is also on TPN.    Colon cancer:  Current medications include: new chemotherapy with irinotecan and panitumumab, to start 8/17/18.    Cancer related pain:  Current medications include: dexamethasone 4mg PO daily (no SE reported), fentanyl patch 300mcg/hr Q72 hours (as [id number]), plus IV PCA with hydromorphone.  Patient reports that the patches are staying stuck to his skin fairly well.  He states that his pain control is adequately.  He has naloxone nasal spray for rescue at home, but has not ever needed it.    Nausea/vomiting:  Current medications include: PRN lorazepam (not used lately), PRN prochlorperazine (not needed), and PRN ondansetron ODT (using about 1 dose per day).  The ondansetron is working well for him.    GERD: Current medications include: Protonix (pantoprazole) 40mg daily.  Pt c/o no current symptoms.  Patient feels that current regimen is effective.       Latest Ht and Wt:  Wt Readings from Last 2 Encounters:   08/17/18 154 lb 8.7 oz (70.1 kg)   08/09/18 158 lb (71.7 kg)     Ht Readings from Last 2 Encounters:   08/09/18 5' 9\" (1.753 m)   08/09/18 5' 9.02\" (1.753 m)        Latest vitals:  There were no vitals taken for this visit. (phone visit)    BP Readings from Last 3 Encounters:   08/17/18 118/80   08/09/18 " 116/80   08/09/18 116/80       Current labs include:  Lab Results   Component Value Date    BUN 25 08/09/2018     Lab Results   Component Value Date    CR 0.72 08/09/2018     Lab Results   Component Value Date    POTASSIUM 4.2 08/09/2018     Lab Results   Component Value Date    ALT 33 08/09/2018     Lab Results   Component Value Date    AST 29 08/09/2018     Lab Results   Component Value Date    BILITOTAL 0.4 08/09/2018     Lab Results   Component Value Date    ALBUMIN 2.5 08/09/2018     Lab Results   Component Value Date    WBC 9.1 08/09/2018     Lab Results   Component Value Date    HGB 8.8 08/09/2018     Lab Results   Component Value Date     08/09/2018     Absolute Neutrophil   Date Value Ref Range Status   08/09/2018 5.3 1.6 - 8.3 10e9/L Final       ASSESSMENT:                 Current medications were reviewed today.      Medication Adherence: good, no issues identified    Colon cancer: Stable. No change at this time.    Cancer related pain: Needs Improvement. Patient would benefit from clamping Gtube for 1-2 hours after dexamethasone administration.    Nausea/vomiting: Stable. Current therapy seems effective.    GERD: Stable.  Current treatment is effective.  I advised patient to clamp the Gtube for 1-2 hours after pantoprazole administration.      PLAN:                  1.  Clamp Gtube for 1-2 hours after each dose of oral medication.    I spent 30 minutes with this patient today. A copy of the visit note was provided to the patient's primary care and referring provider.    Will follow up in about 4 weeks.    The patient was sent via [employer] a summary of these recommendations as an after visit summary.    Sherry Coronel, PharmD, BCOP, BCPS  Clinical Pharmacy Specialist/  Oncology Medication Therapy Management Pharmacist  [facility]  Pager [phone]  Phone [phone]

## 2018-08-17 NOTE — PATIENT INSTRUCTIONS
August 2018 Sunday Monday Tuesday Wednesday Thursday Friday Saturday                  1     IR G TUBE PERCUTANEOUS PLCMNT   11:30 AM   (60 min.)   [id number]   [facility], Interventional Radiology 2     3     4     XR ABDOMEN 2 VIEWS   10:45 AM   (15 min.)   [id number]   [facility],  Radiology   5     6     7     8     9     [facility] RETURN    1:15 PM   (30 min.)   Lorenzo Trujillo MD   [facility] DRAW    3:30 PM   (15 min.)    [facility] DRAW   [facility] Lab Draw     [facility] RETURN    3:45 PM   (30 min.)   Albert Long MD   [facility] 10     11       12     13     14     15     TELEMEDICINE    5:00 PM   (60 min.)   Sherry Coronel [facility] Medication Therapy Management 16     17     P ONC INFUSION 240   12:30 PM   (240 min.)   [facility]   [facility] 18       19     20     21     22     [facility] RETURN    1:45 PM   (45 min.)   Lore Calix MD   [facility] 23     24     [facility] DRAW    8:30 AM   (15 min.)    [facility] DRAW   [facility] Lab Draw     [facility] RETURN    8:45 AM   (50 min.)   Jacque Dale PA   [facility] 25       26     27     28     29     30     31 September 2018 Sunday Monday Tuesday Wednesday Thursday Friday Saturday                                 1       2     3     4     5     6     7     8       9     10     11     12     13     14     15       16     17     18     19     20     21     22       23     24     25     26     27     28     29       30                                                Lab Results:  No results found for this or any previous visit (from the past 12 hour(s)).

## 2018-08-17 NOTE — PATIENT INSTRUCTIONS
Recommendations from today's MTM visit:                                                    MTM (medication therapy management) is a service provided by a clinical pharmacist designed to help you get the most of out of your medicines.   Today we reviewed what your medicines are for, how to know if they are working, that your medicines are safe and how to make your medicine regimen as easy as possible.     1. Try to clamp the G-tube for 1-2 hours after each oral medication.    Next MTM visit: Wednesday 9/12/18 at 8am by PHONE. We can reschedule this if needed.    To schedule another MTM appointment, please call the clinic directly or you may call the MTM scheduling line at [phone] or toll-free at [phone].     My Clinical Pharmacist's contact information:                                                      It was a pleasure seeing you today!  Please feel free to contact me with any questions or concerns you have.      Sherry Coronel, PharmD, BCOP, BCPS  Clinical Pharmacy Specialist/  Oncology Medication Therapy Management Pharmacist  [facility]  Pager [phone]  Phone [phone]          You may receive a survey about the MTM services you received.  I would appreciate your feedback to help me serve you better in the future. Please fill it out and return it when you can. Your comments will be anonymous.

## 2018-08-17 NOTE — MR AVS SNAPSHOT
After Visit Summary   8/17/2018    Sebas Lopez    MRN: [id number]           Patient Information     Date Of Birth          1965        Visit Information        Provider Department      8/17/2018 12:30 PM [facility] 22 ATC; [facility] ONCOLOGY INFUSION [facility]        Today's Diagnoses     Peritoneal carcinomatosis (H)    -  1    Cancer of sigmoid colon (H)          Care Instructions          August 2018 Sunday Monday Tuesday Wednesday Thursday Friday Saturday                  1     IR G TUBE PERCUTANEOUS PLCMNT   11:30 AM   (60 min.)   [id number]   [facility], Interventional Radiology 2     3     4     XR ABDOMEN 2 VIEWS   10:45 AM   (15 min.)   [id number]   [facility],  Radiology   5     6     7     8     9     [facility] RETURN    1:15 PM   (30 min.)   Lorenzo Trujillo MD   [facility]     [facility] DRAW    3:30 PM   (15 min.)   [facility]   [facility] Lab Draw     [facility] RETURN    3:45 PM   (30 min.)   Albert Long MD   [facility] 10     11       12     13     14     15     TELEMEDICINE    5:00 PM   (60 min.)   Sherry Coronel, [facility] Medication Therapy Management 16     17     [facility] ONC INFUSION 240   12:30 PM   (240 min.)   [facility] ONCOLOGY INFUSION   [facility] 18       19     20     21     22     [facility] RETURN    1:45 PM   (45 min.)   Lore Calix MD   [facility] 23     24     [facility] DRAW    8:30 AM   (15 min.)   [facility] D[facility]   [facility] Lab Draw     [facility] RETURN    8:45 AM   (50 min.)   Jacuqe Dale PA   [facility] 25       26     27     28     29     30     31 September 2018 Sunday Monday Tuesday Wednesday Thursday Friday Saturday                                 1       2     3     4     5     6     7     8       9     10     11     12     13     14     15       16     17     18     19     20      21     22       23     24     25     26     27     28     29       30                                                Lab Results:  No results found for this or any previous visit (from the past 12 hour(s)).            Follow-ups after your visit        Your next 10 appointments already scheduled     Aug 22, 2018  2:00 PM CDT   (Arrive by 1:45 PM)   Return Visit with Lore Calix MD   [facility]    [address] [place] [address]  [address]  [facility] [id number] [phone]            Aug 24, 2018  8:30 AM CDT   [facility]  [facility]   [facility] Lab Draw ([facility])    [address] [place] [address]  [address]  [facility] [id number] [phone]            Aug 24, 2018  9:00 AM CDT   (Arrive by 8:45 AM)   Return Visit with ANNAMARIE Espinosa   [facility]    [address] [place] [address]  [address]  [facility] [id number] [phone]              Who to contact     If you have questions or need follow up information about today's clinic visit or your schedule please contact [facility] directly at [phone].  Normal or non-critical lab and imaging results will be communicated to you by MyChart, letter or phone within 4 business days after the clinic has received the results. If you do not hear from us within 7 days, please contact the clinic through MyChart or phone. If you have a critical or abnormal lab result, we will notify you by phone as soon as possible.  Submit refill requests through [employer] or call your pharmacy and they will forward the refill request to us. Please allow 3 business days for your refill to be completed.          Additional Information About Your Visit        [employer] Information     [employer] gives you secure access to your electronic health record. If you see a primary care provider, you can also send  messages to your care team and make appointments. If you have questions, please call your primary care clinic.  If you do not have a primary care provider, please call [phone] and they will assist you.        Care EveryWhere ID     This is your Care EveryWhere ID. This could be used by other organizations to access your [place] medical records  [id number]        Your Vitals Were     Pulse Temperature Respirations Pulse Oximetry BMI (Body Mass Index)       89 98.1  F (36.7  C) (Oral) 16 99% 22.82 kg/m2        Blood Pressure from Last 3 Encounters:   08/17/18 118/80   08/09/18 116/80   08/09/18 116/80    Weight from Last 3 Encounters:   08/17/18 70.1 kg (154 lb 8.7 oz)   08/09/18 71.7 kg (158 lb)   08/09/18 71.8 kg (158 lb 6.4 oz)              Today, you had the following     No orders found for display       Primary Care Provider Office Phone # Fax #    Jaguar Becker [phone]       [facility] [address]  [facility] [address]        Equal Access to Services     [facility]: Hadii aad ku hadasho Soomaali, waaxda luqadaha, qaybta kaalmada adebenitayada, bhargav sage . So [facility] [phone].    ATENCIÓN: Si habla español, tiene a cid disposición servicios gratuitos de asistencia lingüística. [facility] [phone].    We comply with applicable federal civil rights laws and [place] laws. We do not discriminate on the basis of race, color, national origin, age, disability, sex, sexual orientation, or gender identity.            Thank you!     Thank you for choosing [facility]  for your care. Our goal is always to provide you with excellent care. Hearing back from our patients is one way we can continue to improve our services. Please take a few minutes to complete the written survey that you may receive in the mail after your visit with us. Thank you!             Your Updated Medication List - Protect others around you: Learn how to safely  use, store and throw away your medicines at www.disposemymeds.org.          This list is accurate as of 8/17/18  4:45 PM.  Always use your most recent med list.                   Brand Name Dispense Instructions for use Diagnosis    dexamethasone 4 MG tablet    DECADRON    20 tablet    Take 1 tablet (4 mg) by mouth daily    Colon adenocarcinoma (H), Neoplasm related pain       fentaNYL 100 mcg/hr 72 hr patch    DURAGESIC    30 patch    Place 3 patches onto the skin every 72 hours remove old patch.    Colon adenocarcinoma (H), Neoplasm related pain, Peritoneal carcinomatosis (H)       * hydromorphone HCl 500 MG/50ML Soln     25 mL    Dilute 250mg in 250ml NS  0.9mg IV every 10 mins as needed  Dispense 1 bag (250mg = 25ml)    Neoplasm related pain       * hydromorphone HCl 500 MG/50ML Soln     25 mL    Dilute 250mg in 250ml NS  0.9mg IV every 10 mins as needed  Dispense 1 bag (250mg = 25ml)    Neoplasm related pain       * hydromorphone HCl 500 MG/50ML Soln   Start taking on:  8/21/2018     25 mL    Dilute 250mg in 250ml NS  0.9mg IV every 10 mins as needed  Dispense 1 bag (250mg = 25ml)    Neoplasm related pain       * hydromorphone HCl 500 MG/50ML Soln   Start taking on:  8/28/2018     25 mL    Dilute 250mg in 250ml NS  0.9mg IV every 10 mins as needed  Dispense 1 bag (250mg = 25ml)    Neoplasm related pain       LORazepam 0.5 MG tablet    ATIVAN    30 tablet    Take 1 tablet (0.5 mg) by mouth every 4 hours as needed (Anxiety, Nausea/Vomiting or Sleep)    Peritoneal carcinomatosis (H), Cancer of sigmoid colon (H)       naloxone nasal spray    NARCAN    0.2 mL    Spray 1 spray (4 mg) into one nostril alternating nostrils as needed for opioid reversal every 2-3 minutes until assistance arrives    Cancer of sigmoid colon (H), Long term current use of opiate analgesic       ondansetron 8 MG ODT tab    ZOFRAN-ODT    60 tablet    Take 1 tablet (8 mg) by mouth every 8 hours as needed for nausea    Nausea       order for  DME     1 Device    Equipment being ordered: home suction machine with tubing for connection to venting G-tube Treatment Diagnosis: colon adenocarcinoma    Colon adenocarcinoma (H), Small bowel obstruction       pantoprazole 40 MG EC tablet    PROTONIX    30 tablet    Take 1 tablet (40 mg) by mouth every morning (before breakfast)    History of recent steroid use       parenteral nutrition - PTA/DISCHARGE ORDER      Inject into the vein daily [facility]        prochlorperazine 10 MG tablet    COMPAZINE     10 mg every 6 hours as needed        sodium chloride 0.9% Soln BOLUS     1000 mL    Inject 1,000 mLs into the vein daily as needed For hydration.        * Notice:  This list has 4 medication(s) that are the same as other medications prescribed for you. Read the directions carefully, and ask your doctor or other care provider to review them with you.

## 2018-08-22 NOTE — PROGRESS NOTES
This is a recent snapshot of the patient's [facility] medical record.  For current drug dose and complete information and questions, call [phone] or In Basket pool, [facility] home infusion ([id number])  CSN Number:  [id number]

## 2018-08-23 NOTE — PROGRESS NOTES
Oncology/Hematology Visit Note  Aug 24, 2018    Reason for Visit: follow up of stage IV sigmoid adenocarcinoma    History of Present Illness:   Please see previous note for details. I have copied and updated from prior notes.   Sebas is a 53-year-old male who has a history of ulcerative colitis diagnosed more than 20 years ago, but he has not had medical management for that.  He was doing well, but in 05/2017 he presented with a few weeks of abdominal pain.  At that time, his imaging showed that he had a sigmoid wall thickening with adjacent mesenteric lymphadenopathy and free fluid near the abdominal wall mesh from his prior hernia surgery.  He subsequently underwent a colonoscopy which was incomplete and showed an obstructing sigmoid colon mass.  The biopsy from the sigmoid mass showed sigmoid adenocarcinoma.  He then developed obstructive symptoms and underwent exploratory laparotomy on 07/10/2017.  During that he was found to have diffuse peritoneal carcinomatosis.  Extensive lysis of adhesions was performed and a small bowel resection was performed with end ileostomy.  The biopsies from the multiple large peritoneal metastasis also were positive for metastatic adenocarcinoma.       He started palliative FOLFOX on 8/11/17. C#6 given on 10/26/17  After 6 cycles, he has stable disease     On 12/6/17, he had Diagnostic laparoscopy and Exploratory laparotomy, but HIPEC was not performed as Peritoneal cancer index was 26 with diffuse involvement of the small bowel as well as the small bowel mesentery.      He then presented to ED on 1/26 with abdominal pain and associated nausea. CT A/P on 1/26 with 5.2 x 4.5 x 3.6 cm proximal sigmoid mass causnig colonic obstriction with singificant distention of cecum (7cm in diameter), ascending colon and proximal transverse colon. No pneumatosis or pneumoperitoneum. Also small volume of ascites with associated findings concerning for peritoneal carcinomatosis, increased from  prior studies. Dr. Dudley was consulted and recommended no surgical intervention.     He was then seen in clinic on 1/28 and 1/29/2018 as unable to tolerate solid foods and he was getting abdominal cramping and some nausea. He also noticed that his ostomy output decreased. He was given IV fluids as well as antibiotics and started on OxyContin 10 mg twice a day along with p.r.n. oxycodone. Of note a few weeks ago he was started on short acting octreotide to decrease the ostomy output but he stopped taking it about one week ago and few days prior to that he noticed worsening of the abdominal cramping.     Repeat CT scan showed worsening peritoneal carcinomatosis and he is getting more symptomatic in terms of worsening abdominal pain and difficulty eating because of worsening abdominal cramping. He was admitted to the hospital with worsening colon distention and colon stent was placed which improved his symptoms.      He was recently hospitalized from 2/20-2/25/18 with presumed infectious colitis and bacteremia. His port was removed. He was discharged home on IV vancomycin via a PICC line which he completed on 3/7/2018.      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 He was hospitalized with a bowel obstruction from 3/18-3/20/18 and a colonic stent was placed on 3/19/18.  C#8 3/22/18 FOLFOX  C#9 4/5/18 FOLFOX        Repeat CT shows slightly improved disease and less ascites now.   Because of progressive fatigue, we stopped the 5-FU bolus and leucovorin and continued with the 5-FU infusion and oxaliplatin.      C#10 5/4/18  C#11 5/18/18  C#13 5/31/18  C#13 6/15/18     Repeat CT scan showed worsening peritoneal disease. There is also increased fluid in the proximal colon as well as post stent fluid in the rectum. This could be consistent with colitis. Given levofloxacin/flagyl and plan was to switch therapy to irinotecan and panitumumab though was hospitalized on 7/3 for  abdominal pain and bloating as well as rectal bleeding. Found to have colitis with increased fluid dilation of the colon. GI did a flex sig and placed a colon stent for the third time. Other stents were found to have ingrowth of tumor.     Again admitted on 7/24 with SBO and PAULINA. Venting G tube placed which helped with N/V.    Started Irinotecan and Panitumumab on 8/17/18.     Interval History:  Sebas states he has been feeling well. He feels this chemo was more tolerable than FOLFOX.  Yesterday he did not need to use PCA at all. He continues on Fentanyl 300 mcg patch daily. He thinks his abdominal pain is overall better. He does feel he has more output from ostomy site since chemo last week. He estimates he is changing bag at least every 2 hours, but has not been recording volumes. He is able to drink fluids or creamy soups. He states he has some food such as ground meat occasionally, even though he is cognizant that anything other than liquids could block venting g tube. He has venting tube disconnected when he is up and doing things. He does have it attached at night and perhaps half the hours of the day. He has had a little nausea intermittently for which he is taking Zofran as needed. If he goes too long with the venting g tube disconnected, he sometimes has a little more pain. No vomiting. He has some output rectally with clear liquid or mucous, but nothing unusual. He does think he has been a little more tired. He is up and about at least half the day though and denies any dizziness. He went to [place] with a friend of his and did some other traveling. He states he wakes frequently at night, but not do to pain or nausea.     He continues on TPN at night for 12 hours, 8pm-8am. He thinks volume about 2800. Also does 500mL IVF NS daily. He is also drinking at least 64oz of fluids. Venting bag is 2000mL, and he estimates he fills about 1000mL daily.     Review of Systems:  Denies any rashes, cough, dyspnea, chest  pain, edema, fever, chills, bleeding/bruising, neuropathy.     Current Outpatient Prescriptions   Medication Sig Dispense Refill     dexamethasone (DECADRON) 4 MG tablet Take 1 tablet (4 mg) by mouth daily 20 tablet 0     fentaNYL (DURAGESIC) 100 mcg/hr 72 hr patch Place 3 patches onto the skin every 72 hours remove old patch. 30 patch 0     hydromorphone HCl 500 MG/50ML SOLN Dilute 250mg in 250ml NS    0.9mg IV every 10 mins as needed    Dispense 1 bag (250mg = 25ml) 25 mL 0     hydromorphone HCl 500 MG/50ML SOLN Dilute 250mg in 250ml NS    0.9mg IV every 10 mins as needed    Dispense 1 bag (250mg = 25ml) 25 mL 0     hydromorphone HCl 500 MG/50ML SOLN Dilute 250mg in 250ml NS    0.9mg IV every 10 mins as needed    Dispense 1 bag (250mg = 25ml) 25 mL 0     [START ON 8/28/2018] hydromorphone HCl 500 MG/50ML SOLN Dilute 250mg in 250ml NS    0.9mg IV every 10 mins as needed    Dispense 1 bag (250mg = 25ml) 25 mL 0     LORazepam (ATIVAN) 0.5 MG tablet Take 1 tablet (0.5 mg) by mouth every 4 hours as needed (Anxiety, Nausea/Vomiting or Sleep) 30 tablet 5     ondansetron (ZOFRAN-ODT) 8 MG ODT tab Take 1 tablet (8 mg) by mouth every 8 hours as needed for nausea 60 tablet 5     order for DME Equipment being ordered: home suction machine with tubing for connection to venting G-tube  Treatment Diagnosis: colon adenocarcinoma 1 Device 0     pantoprazole (PROTONIX) 40 MG EC tablet Take 1 tablet (40 mg) by mouth every morning (before breakfast) 30 tablet 0     parenteral nutrition - PTA/DISCHARGE ORDER Inject into the vein daily FVHI       prochlorperazine (COMPAZINE) 10 MG tablet 10 mg every 6 hours as needed        sodium chloride 0.9% SOLN BOLUS Inject 1,000 mLs into the vein daily as needed For hydration. 1000 mL 0     naloxone (NARCAN) nasal spray Spray 1 spray (4 mg) into one nostril alternating nostrils as needed for opioid reversal every 2-3 minutes until assistance arrives (Patient not taking: Reported on 8/9/2018) 0.2  "mL 0     [DISCONTINUED] dexamethasone (DECADRON) 4 MG tablet Take 1 tablet (4 mg) by mouth daily 20 tablet 0       Physical Examination:  General: The patient is a pleasant male in no acute distress.  /80  Pulse 90  Temp 99.1  F (37.3  C)  Resp 16  Ht 1.753 m (5' 9.02\")  Wt 68.2 kg (150 lb 6.4 oz)  SpO2 99%  BMI 22.2 kg/m2  Wt Readings from Last 10 Encounters:   08/24/18 68.2 kg (150 lb 6.4 oz)   08/17/18 70.1 kg (154 lb 8.7 oz)   08/09/18 71.7 kg (158 lb)   08/09/18 71.8 kg (158 lb 6.4 oz)   08/06/18 71.5 kg (157 lb 10.1 oz)   07/24/18 65 kg (143 lb 6.4 oz)   07/20/18 69.9 kg (154 lb 3.2 oz)   07/10/18 73 kg (161 lb)   07/08/18 74.7 kg (164 lb 9.6 oz)   06/28/18 69.2 kg (152 lb 9.6 oz)     HEENT: EOMI, PERRL. Sclerae are anicteric. Oral mucosa is pink and moist with no lesions or thrush.   Lymph: Neck is supple with no lymphadenopathy in the cervical or supraclavicular areas.   Heart: Regular rate and rhythm.   Lungs: Clear to auscultation bilaterally.   Abdomen: Bowel sounds present, soft. Ostomy site is clean with brown, liquid stool. Venting g tube site is c/d/i. Abdomen with some tenderness and firmness around periumbilical area and LLQ.   Extremities: No lower extremity edema noted bilaterally.   Neuro: Cranial nerves II through XII are grossly intact.  Skin: No rashes, petechiae, or bruising noted on exposed skin.    Laboratory Data:  Results for orders placed or performed in visit on 08/24/18 (from the past 24 hour(s))   *CBC with platelets differential   Result Value Ref Range    WBC 4.6 4.0 - 11.0 10e9/L    RBC Count 3.28 (L) 4.4 - 5.9 10e12/L    Hemoglobin 8.8 (L) 13.3 - 17.7 g/dL    Hematocrit 27.2 (L) 40.0 - 53.0 %    MCV 83 78 - 100 fl    MCH 26.8 26.5 - 33.0 pg    MCHC 32.4 31.5 - 36.5 g/dL    RDW 17.3 (H) 10.0 - 15.0 %    Platelet Count 320 150 - 450 10e9/L    Diff Method Automated Method     % Neutrophils 62.1 %    % Lymphocytes 26.7 %    % Monocytes 9.2 %    % Eosinophils 1.1 %    % " Basophils 0.2 %    % Immature Granulocytes 0.7 %    Nucleated RBCs 0 0 /100    Absolute Neutrophil 2.8 1.6 - 8.3 10e9/L    Absolute Lymphocytes 1.2 0.8 - 5.3 10e9/L    Absolute Monocytes 0.4 0.0 - 1.3 10e9/L    Absolute Eosinophils 0.1 0.0 - 0.7 10e9/L    Absolute Basophils 0.0 0.0 - 0.2 10e9/L    Abs Immature Granulocytes 0.0 0 - 0.4 10e9/L    Absolute Nucleated RBC 0.0    Comprehensive metabolic panel   Result Value Ref Range    Sodium 134 133 - 144 mmol/L    Potassium 4.2 3.4 - 5.3 mmol/L    Chloride 98 94 - 109 mmol/L    Carbon Dioxide 28 20 - 32 mmol/L    Anion Gap 8 3 - 14 mmol/L    Glucose 110 (H) 70 - 99 mg/dL    Urea Nitrogen 30 7 - 30 mg/dL    Creatinine 0.73 0.66 - 1.25 mg/dL    GFR Estimate >90 >60 mL/min/1.7m2    GFR Estimate If Black >90 >60 mL/min/1.7m2    Calcium 8.5 8.5 - 10.1 mg/dL    Bilirubin Total 0.3 0.2 - 1.3 mg/dL    Albumin 2.7 (L) 3.4 - 5.0 g/dL    Protein Total 7.5 6.8 - 8.8 g/dL    Alkaline Phosphatase 145 40 - 150 U/L    ALT 65 0 - 70 U/L    AST 34 0 - 45 U/L   Magnesium   Result Value Ref Range    Magnesium 2.0 1.6 - 2.3 mg/dL   Phosphorus   Result Value Ref Range    Phosphorus 3.2 2.5 - 4.5 mg/dL         Assessment and Plan:    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 Started irinotecan and Panitumumab on 8/17. Because of his several complications in the recent past, he is here 1 week after start of chemotherapy to be assessed for toxicity and possibly get IV fluids. Plan to give him at least a couple of months of chemotherapy before repeat imaging. He appears to be tolerating well so far and has only noticed some increased ostomy output. As he is not measuring, difficult to assess. However, does not appear dehydrated on labs or exam. Electrolytes WNL. He has imodium and lomotil to use prn.   I also advised him to continue the IV fluids at home.  Currently he is getting 500 cc daily.    --Will schedule next cycle on 8/31 and 9/14.     Abdominal pain due to peritoneal  carcinomatosis as well as colonic obstruction.  Continue with fentanyl and Dilaudid PCA.  With this regimen his pain is under very decent control currently.  Continue follow-up with palliative care. He is scheduled next on 8/29, but would prefer all his appointments on same day as possible as he is traveling here from [place]. Will see if it can be rescheduled.     Nausea vomiting/nutrition.  His nausea and vomiting significantly improved after placement of venting G-tube.  Currently he is on full liquid diet and he is on TPN.  Continue the same.  Continue Zofran as needed for nausea and vomiting.     Anemia.  This is due to a combination of previous chemotherapy as well as from the cancer causing anemia of chronic disease.  Previously he had evidence of iron deficiency anemia.  Will add soluble transferrin receptor today     Neuropathy.  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  Currently he denies any neuropathy.       Jacque Dale PA-C  [facility]  [address] [place] [address]  [phone]

## 2018-08-24 NOTE — LETTER
8/24/2018      RE: Sebas Lopez  [address] [place] [address]       Oncology/Hematology Visit Note  Aug 24, 2018    Reason for Visit: follow up of stage IV sigmoid adenocarcinoma    History of Present Illness:   Please see previous note for details. I have copied and updated from prior notes.   Sebas is a 53-year-old male who has a history of ulcerative colitis diagnosed more than 20 years ago, but he has not had medical management for that.  He was doing well, but in 05/2017 he presented with a few weeks of abdominal pain.  At that time, his imaging showed that he had a sigmoid wall thickening with adjacent mesenteric lymphadenopathy and free fluid near the abdominal wall mesh from his prior hernia surgery.  He subsequently underwent a colonoscopy which was incomplete and showed an obstructing sigmoid colon mass.  The biopsy from the sigmoid mass showed sigmoid adenocarcinoma.  He then developed obstructive symptoms and underwent exploratory laparotomy on 07/10/2017.  During that he was found to have diffuse peritoneal carcinomatosis.  Extensive lysis of adhesions was performed and a small bowel resection was performed with end ileostomy.  The biopsies from the multiple large peritoneal metastasis also were positive for metastatic adenocarcinoma.       He started palliative FOLFOX on 8/11/17. C#6 given on 10/26/17  After 6 cycles, he has stable disease     On 12/6/17, he had Diagnostic laparoscopy and Exploratory laparotomy, but HIPEC was not performed as Peritoneal cancer index was 26 with diffuse involvement of the small bowel as well as the small bowel mesentery.      He then presented to ED on 1/26 with abdominal pain and associated nausea. CT A/P on 1/26 with 5.2 x 4.5 x 3.6 cm proximal sigmoid mass causnig colonic obstriction with singificant distention of cecum (7cm in diameter), ascending colon and proximal transverse colon. No pneumatosis or pneumoperitoneum. Also small volume of ascites  with associated findings concerning for peritoneal carcinomatosis, increased from prior studies. Dr. Dudley was consulted and recommended no surgical intervention.     He was then seen in clinic on 1/28 and 1/29/2018 as unable to tolerate solid foods and he was getting abdominal cramping and some nausea. He also noticed that his ostomy output decreased. He was given IV fluids as well as antibiotics and started on OxyContin 10 mg twice a day along with p.r.n. oxycodone. Of note a few weeks ago he was started on short acting octreotide to decrease the ostomy output but he stopped taking it about one week ago and few days prior to that he noticed worsening of the abdominal cramping.     Repeat CT scan showed worsening peritoneal carcinomatosis and he is getting more symptomatic in terms of worsening abdominal pain and difficulty eating because of worsening abdominal cramping. He was admitted to the hospital with worsening colon distention and colon stent was placed which improved his symptoms.      He was recently hospitalized from 2/20-2/25/18 with presumed infectious colitis and bacteremia. His port was removed. He was discharged home on IV vancomycin via a PICC line which he completed on 3/7/2018.      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 He was hospitalized with a bowel obstruction from 3/18-3/20/18 and a colonic stent was placed on 3/19/18.  C#8 3/22/18 FOLFOX  C#9 4/5/18 FOLFOX        Repeat CT shows slightly improved disease and less ascites now.   Because of progressive fatigue, we stopped the 5-FU bolus and leucovorin and continued with the 5-FU infusion and oxaliplatin.      C#10 5/4/18  C#11 5/18/18  C#13 5/31/18  C#13 6/15/18     Repeat CT scan showed worsening peritoneal disease. There is also increased fluid in the proximal colon as well as post stent fluid in the rectum. This could be consistent with colitis. Given levofloxacin/flagyl and plan was  to switch therapy to irinotecan and panitumumab though was hospitalized on 7/3 for abdominal pain and bloating as well as rectal bleeding. Found to have colitis with increased fluid dilation of the colon. GI did a flex sig and placed a colon stent for the third time. Other stents were found to have ingrowth of tumor.     Again admitted on 7/24 with SBO and PAULINA. Venting G tube placed which helped with N/V.    Started Irinotecan and Panitumumab on 8/17/18.     Interval History:  Sebas states he has been feeling well. He feels this chemo was more tolerable than FOLFOX.  Yesterday he did not need to use PCA at all. He continues on Fentanyl 300 mcg patch daily. He thinks his abdominal pain is overall better. He does feel he has more output from ostomy site since chemo last week. He estimates he is changing bag at least every 2 hours, but has not been recording volumes. He is able to drink fluids or creamy soups. He states he has some food such as ground meat occasionally, even though he is cognizant that anything other than liquids could block venting g tube. He has venting tube disconnected when he is up and doing things. He does have it attached at night and perhaps half the hours of the day. He has had a little nausea intermittently for which he is taking Zofran as needed. If he goes too long with the venting g tube disconnected, he sometimes has a little more pain. No vomiting. He has some output rectally with clear liquid or mucous, but nothing unusual. He does think he has been a little more tired. He is up and about at least half the day though and denies any dizziness. He went to [place] with a friend of his and did some other traveling. He states he wakes frequently at night, but not do to pain or nausea.     He continues on TPN at night for 12 hours, 8pm-8am. He thinks volume about 2800. Also does 500mL IVF NS daily. He is also drinking at least 64oz of fluids. Venting bag is 2000mL, and he estimates he fills  about 1000mL daily.     Review of Systems:  Denies any rashes, cough, dyspnea, chest pain, edema, fever, chills, bleeding/bruising, neuropathy.     Current Outpatient Prescriptions   Medication Sig Dispense Refill     dexamethasone (DECADRON) 4 MG tablet Take 1 tablet (4 mg) by mouth daily 20 tablet 0     fentaNYL (DURAGESIC) 100 mcg/hr 72 hr patch Place 3 patches onto the skin every 72 hours remove old patch. 30 patch 0     hydromorphone HCl 500 MG/50ML SOLN Dilute 250mg in 250ml NS    0.9mg IV every 10 mins as needed    Dispense 1 bag (250mg = 25ml) 25 mL 0     hydromorphone HCl 500 MG/50ML SOLN Dilute 250mg in 250ml NS    0.9mg IV every 10 mins as needed    Dispense 1 bag (250mg = 25ml) 25 mL 0     hydromorphone HCl 500 MG/50ML SOLN Dilute 250mg in 250ml NS    0.9mg IV every 10 mins as needed    Dispense 1 bag (250mg = 25ml) 25 mL 0     [START ON 8/28/2018] hydromorphone HCl 500 MG/50ML SOLN Dilute 250mg in 250ml NS    0.9mg IV every 10 mins as needed    Dispense 1 bag (250mg = 25ml) 25 mL 0     LORazepam (ATIVAN) 0.5 MG tablet Take 1 tablet (0.5 mg) by mouth every 4 hours as needed (Anxiety, Nausea/Vomiting or Sleep) 30 tablet 5     ondansetron (ZOFRAN-ODT) 8 MG ODT tab Take 1 tablet (8 mg) by mouth every 8 hours as needed for nausea 60 tablet 5     order for DME Equipment being ordered: home suction machine with tubing for connection to venting G-tube  Treatment Diagnosis: colon adenocarcinoma 1 Device 0     pantoprazole (PROTONIX) 40 MG EC tablet Take 1 tablet (40 mg) by mouth every morning (before breakfast) 30 tablet 0     parenteral nutrition - PTA/DISCHARGE ORDER Inject into the vein daily FVHI       prochlorperazine (COMPAZINE) 10 MG tablet 10 mg every 6 hours as needed        sodium chloride 0.9% SOLN BOLUS Inject 1,000 mLs into the vein daily as needed For hydration. 1000 mL 0     naloxone (NARCAN) nasal spray Spray 1 spray (4 mg) into one nostril alternating nostrils as needed for opioid reversal every  "2-3 minutes until assistance arrives (Patient not taking: Reported on 8/9/2018) 0.2 mL 0     [DISCONTINUED] dexamethasone (DECADRON) 4 MG tablet Take 1 tablet (4 mg) by mouth daily 20 tablet 0       Physical Examination:  General: The patient is a pleasant male in no acute distress.  /80  Pulse 90  Temp 99.1  F (37.3  C)  Resp 16  Ht 1.753 m (5' 9.02\")  Wt 68.2 kg (150 lb 6.4 oz)  SpO2 99%  BMI 22.2 kg/m2  Wt Readings from Last 10 Encounters:   08/24/18 68.2 kg (150 lb 6.4 oz)   08/17/18 70.1 kg (154 lb 8.7 oz)   08/09/18 71.7 kg (158 lb)   08/09/18 71.8 kg (158 lb 6.4 oz)   08/06/18 71.5 kg (157 lb 10.1 oz)   07/24/18 65 kg (143 lb 6.4 oz)   07/20/18 69.9 kg (154 lb 3.2 oz)   07/10/18 73 kg (161 lb)   07/08/18 74.7 kg (164 lb 9.6 oz)   06/28/18 69.2 kg (152 lb 9.6 oz)     HEENT: EOMI, PERRL. Sclerae are anicteric. Oral mucosa is pink and moist with no lesions or thrush.   Lymph: Neck is supple with no lymphadenopathy in the cervical or supraclavicular areas.   Heart: Regular rate and rhythm.   Lungs: Clear to auscultation bilaterally.   Abdomen: Bowel sounds present, soft. Ostomy site is clean with brown, liquid stool. Venting g tube site is c/d/i. Abdomen with some tenderness and firmness around periumbilical area and LLQ.   Extremities: No lower extremity edema noted bilaterally.   Neuro: Cranial nerves II through XII are grossly intact.  Skin: No rashes, petechiae, or bruising noted on exposed skin.    Laboratory Data:  Results for orders placed or performed in visit on 08/24/18 (from the past 24 hour(s))   *CBC with platelets differential   Result Value Ref Range    WBC 4.6 4.0 - 11.0 10e9/L    RBC Count 3.28 (L) 4.4 - 5.9 10e12/L    Hemoglobin 8.8 (L) 13.3 - 17.7 g/dL    Hematocrit 27.2 (L) 40.0 - 53.0 %    MCV 83 78 - 100 fl    MCH 26.8 26.5 - 33.0 pg    MCHC 32.4 31.5 - 36.5 g/dL    RDW 17.3 (H) 10.0 - 15.0 %    Platelet Count 320 150 - 450 10e9/L    Diff Method Automated Method     % " Neutrophils 62.1 %    % Lymphocytes 26.7 %    % Monocytes 9.2 %    % Eosinophils 1.1 %    % Basophils 0.2 %    % Immature Granulocytes 0.7 %    Nucleated RBCs 0 0 /100    Absolute Neutrophil 2.8 1.6 - 8.3 10e9/L    Absolute Lymphocytes 1.2 0.8 - 5.3 10e9/L    Absolute Monocytes 0.4 0.0 - 1.3 10e9/L    Absolute Eosinophils 0.1 0.0 - 0.7 10e9/L    Absolute Basophils 0.0 0.0 - 0.2 10e9/L    Abs Immature Granulocytes 0.0 0 - 0.4 10e9/L    Absolute Nucleated RBC 0.0    Comprehensive metabolic panel   Result Value Ref Range    Sodium 134 133 - 144 mmol/L    Potassium 4.2 3.4 - 5.3 mmol/L    Chloride 98 94 - 109 mmol/L    Carbon Dioxide 28 20 - 32 mmol/L    Anion Gap 8 3 - 14 mmol/L    Glucose 110 (H) 70 - 99 mg/dL    Urea Nitrogen 30 7 - 30 mg/dL    Creatinine 0.73 0.66 - 1.25 mg/dL    GFR Estimate >90 >60 mL/min/1.7m2    GFR Estimate If Black >90 >60 mL/min/1.7m2    Calcium 8.5 8.5 - 10.1 mg/dL    Bilirubin Total 0.3 0.2 - 1.3 mg/dL    Albumin 2.7 (L) 3.4 - 5.0 g/dL    Protein Total 7.5 6.8 - 8.8 g/dL    Alkaline Phosphatase 145 40 - 150 U/L    ALT 65 0 - 70 U/L    AST 34 0 - 45 U/L   Magnesium   Result Value Ref Range    Magnesium 2.0 1.6 - 2.3 mg/dL   Phosphorus   Result Value Ref Range    Phosphorus 3.2 2.5 - 4.5 mg/dL         Assessment and Plan:    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 Started irinotecan and Panitumumab on 8/17. Because of his several complications in the recent past, he is here 1 week after start of chemotherapy to be assessed for toxicity and possibly get IV fluids. Plan to give him at least a couple of months of chemotherapy before repeat imaging. He appears to be tolerating well so far and has only noticed some increased ostomy output. As he is not measuring, difficult to assess. However, does not appear dehydrated on labs or exam. Electrolytes WNL. He has imodium and lomotil to use prn.   I also advised him to continue the IV fluids at home.  Currently he is getting 500 cc  daily.    --Will schedule next cycle on 8/31 and 9/14.     Abdominal pain due to peritoneal carcinomatosis as well as colonic obstruction.  Continue with fentanyl and Dilaudid PCA.  With this regimen his pain is under very decent control currently.  Continue follow-up with palliative care. He is scheduled next on 8/29, but would prefer all his appointments on same day as possible as he is traveling here from [place]. Will see if it can be rescheduled.     Nausea vomiting/nutrition.  His nausea and vomiting significantly improved after placement of venting G-tube.  Currently he is on full liquid diet and he is on TPN.  Continue the same.  Continue Zofran as needed for nausea and vomiting.     Anemia.  This is due to a combination of previous chemotherapy as well as from the cancer causing anemia of chronic disease.  Previously he had evidence of iron deficiency anemia.   Will add soluble transferrin receptor today     Neuropathy.  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  Currently he denies any neuropathy.       Jacque Dale PA-C  [facility]  [address] [place] [address]  [phone]      ANNAMARIE Celestin

## 2018-08-24 NOTE — NURSING NOTE
Chief Complaint   Patient presents with     Blood Draw     VS done, JIC labs collected via PICC line.  Hx colon CA.

## 2018-08-24 NOTE — MR AVS SNAPSHOT
After Visit Summary   8/24/2018    Sebas Lopez    MRN: [id number]           Patient Information     Date Of Birth          1965        Visit Information        Provider Department      8/24/2018 9:00 AM Jacque Dale PA [facility]        Today's Diagnoses     Colon adenocarcinoma (H)    -  1    Neoplasm related pain           Follow-ups after your visit        Your next 10 appointments already scheduled     Aug 29, 2018  4:00 PM CDT   (Arrive by 3:45 PM)   Return Visit with Lore Calix MD   [facility] ([facility])    [address] [place] [address]  [address]  [facility] [address] [phone]            Sep 12, 2018  8:00 AM CDT   TELEMEDICINE with Sherry Coronel RPH   [facility] ([facility])    [address] [place] [address]  [address]  [facility] [address] [phone]           Note: this is not an onsite visit; there is no need to come to the facility.              Who to contact     If you have questions or need follow up information about today's clinic visit or your schedule please contact [facility] directly at [phone].  Normal or non-critical lab and imaging results will be communicated to you by MyChart, letter or phone within 4 business days after the clinic has received the results. If you do not hear from us within 7 days, please contact the clinic through MyChart or phone. If you have a critical or abnormal lab result, we will notify you by phone as soon as possible.  Submit refill requests through [employer] or call your pharmacy and they will forward the refill request to us. Please allow 3 business days for your refill to be completed.          Additional Information About Your Visit        MyChart Information     [employer] gives you secure access to your electronic health record. If you see a primary care provider, you can  "also send messages to your care team and make appointments. If you have questions, please call your primary care clinic.  If you do not have a primary care provider, please call [phone] and they will assist you.        Care EveryWhere ID     This is your Care EveryWhere ID. This could be used by other organizations to access your [facility] medical records  [id number]        Your Vitals Were     Pulse Temperature Respirations Height Pulse Oximetry BMI (Body Mass Index)    90 99.1  F (37.3  C) 16 1.753 m (5' 9.02\") 99% 22.2 kg/m2       Blood Pressure from Last 3 Encounters:   08/24/18 119/80   08/17/18 118/80   08/09/18 116/80    Weight from Last 3 Encounters:   08/24/18 68.2 kg (150 lb 6.4 oz)   08/17/18 70.1 kg (154 lb 8.7 oz)   08/09/18 71.7 kg (158 lb)              We Performed the Following     *CBC with platelets differential     Comprehensive metabolic panel     Magnesium     Phosphorus     Soluble transferrin receptor          Where to get your medicines      These medications were sent to [facility] [address] [address]     Phone:  [phone]     dexamethasone 4 MG tablet          Primary Care Provider Office Phone # Fax #    Jaguar Becker [phone]       [place] [facility] [address] [facility] [address]        Equal Access to Services     [facility]: Hadii karrie lazar Soshu, waaxda luqadaha, qaybta kaalmada lewis, bhargav willis. So [facility] [phone].    ATENCIÓN: Si habla español, tiene a cid disposición servicios gratuitos de asistencia lingüística. Kenan al [phone].    We comply with applicable federal civil rights laws and [place] laws. We do not discriminate on the basis of race, color, national origin, age, disability, sex, sexual orientation, or gender identity.            Thank you!     Thank you for choosing [facility]" CLINIC  for your care. Our goal is always to provide you with excellent care. Hearing back from our patients is one way we can continue to improve our services. Please take a few minutes to complete the written survey that you may receive in the mail after your visit with us. Thank you!             Your Updated Medication List - Protect others around you: Learn how to safely use, store and throw away your medicines at www.disposemymeds.org.          This list is accurate as of 8/24/18 12:27 PM.  Always use your most recent med list.                   Brand Name Dispense Instructions for use Diagnosis    dexamethasone 4 MG tablet    DECADRON    20 tablet    Take 1 tablet (4 mg) by mouth daily    Colon adenocarcinoma (H), Neoplasm related pain       fentaNYL 100 mcg/hr 72 hr patch    DURAGESIC    30 patch    Place 3 patches onto the skin every 72 hours remove old patch.    Colon adenocarcinoma (H), Neoplasm related pain, Peritoneal carcinomatosis (H)       * hydromorphone HCl 500 MG/50ML Soln     25 mL    Dilute 250mg in 250ml NS  0.9mg IV every 10 mins as needed  Dispense 1 bag (250mg = 25ml)    Neoplasm related pain       * hydromorphone HCl 500 MG/50ML Soln     25 mL    Dilute 250mg in 250ml NS  0.9mg IV every 10 mins as needed  Dispense 1 bag (250mg = 25ml)    Neoplasm related pain       * hydromorphone HCl 500 MG/50ML Soln     25 mL    Dilute 250mg in 250ml NS  0.9mg IV every 10 mins as needed  Dispense 1 bag (250mg = 25ml)    Neoplasm related pain       * hydromorphone HCl 500 MG/50ML Soln   Start taking on:  8/28/2018     25 mL    Dilute 250mg in 250ml NS  0.9mg IV every 10 mins as needed  Dispense 1 bag (250mg = 25ml)    Neoplasm related pain       LORazepam 0.5 MG tablet    ATIVAN    30 tablet    Take 1 tablet (0.5 mg) by mouth every 4 hours as needed (Anxiety, Nausea/Vomiting or Sleep)    Peritoneal carcinomatosis (H), Cancer of sigmoid colon (H)       naloxone nasal spray    NARCAN    0.2 mL    Spray 1  spray (4 mg) into one nostril alternating nostrils as needed for opioid reversal every 2-3 minutes until assistance arrives    Cancer of sigmoid colon (H), Long term current use of opiate analgesic       ondansetron 8 MG ODT tab    ZOFRAN-ODT    60 tablet    Take 1 tablet (8 mg) by mouth every 8 hours as needed for nausea    Nausea       order for DME     1 Device    Equipment being ordered: home suction machine with tubing for connection to venting G-tube Treatment Diagnosis: colon adenocarcinoma    Colon adenocarcinoma (H), Small bowel obstruction       pantoprazole 40 MG EC tablet    PROTONIX    30 tablet    Take 1 tablet (40 mg) by mouth every morning (before breakfast)    History of recent steroid use       parenteral nutrition - PTA/DISCHARGE ORDER      Inject into the vein daily FVHI        prochlorperazine 10 MG tablet    COMPAZINE     10 mg every 6 hours as needed        sodium chloride 0.9% Soln BOLUS     1000 mL    Inject 1,000 mLs into the vein daily as needed For hydration.        * Notice:  This list has 4 medication(s) that are the same as other medications prescribed for you. Read the directions carefully, and ask your doctor or other care provider to review them with you.

## 2018-08-24 NOTE — NURSING NOTE
"Oncology Rooming Note    August 24, 2018 8:55 AM   Sebas Lopez is a 53 year old male who presents for:    Chief Complaint   Patient presents with     Blood Draw     VS done, JIC labs collected via PICC line.  Hx colon CA.     RECHECK     ONC colon Ca      Initial Vitals: /80  Pulse 90  Temp 99.1  F (37.3  C)  Resp 16  Ht 1.753 m (5' 9.02\")  Wt 68.2 kg (150 lb 6.4 oz)  SpO2 99%  BMI 22.2 kg/m2 Estimated body mass index is 22.2 kg/(m^2) as calculated from the following:    Height as of this encounter: 1.753 m (5' 9.02\").    Weight as of this encounter: 68.2 kg (150 lb 6.4 oz). Body surface area is 1.82 meters squared.  Mild Pain (2) Comment: Data Unavailable   No LMP for male patient.  Allergies reviewed: Yes  Medications reviewed: Yes    Medications: MEDICATION REFILLS NEEDED TODAY. Provider was notified.  Pharmacy name entered into [employer]: [facility] - [place] - [place], [place] - [address] [place] [address]    Clinical concerns: none      6 minutes for nursing intake (face to face time)     Judith TEOFILO Damon              "

## 2018-08-30 PROBLEM — E86.0 DEHYDRATION: Status: ACTIVE | Noted: 2018-01-01

## 2018-08-30 NOTE — PROGRESS NOTES
Oncology/Hematology Visit Note  Aug 30, 2018    Reason for Visit: follow up of stage IV sigmoid adenocarcinoma    History of Present Illness:   Please see previous note for details. I have copied and updated from prior notes.   Sebas is a 53-year-old male who has a history of ulcerative colitis diagnosed more than 20 years ago, but he has not had medical management for that.  He was doing well, but in 05/2017 he presented with a few weeks of abdominal pain.  At that time, his imaging showed that he had a sigmoid wall thickening with adjacent mesenteric lymphadenopathy and free fluid near the abdominal wall mesh from his prior hernia surgery.  He subsequently underwent a colonoscopy which was incomplete and showed an obstructing sigmoid colon mass.  The biopsy from the sigmoid mass showed sigmoid adenocarcinoma.  He then developed obstructive symptoms and underwent exploratory laparotomy on 07/10/2017.  During that he was found to have diffuse peritoneal carcinomatosis.  Extensive lysis of adhesions was performed and a small bowel resection was performed with end ileostomy.  The biopsies from the multiple large peritoneal metastasis also were positive for metastatic adenocarcinoma.       He started palliative FOLFOX on 8/11/17. C#6 given on 10/26/17  After 6 cycles, he has stable disease     On 12/6/17, he had Diagnostic laparoscopy and Exploratory laparotomy, but HIPEC was not performed as Peritoneal cancer index was 26 with diffuse involvement of the small bowel as well as the small bowel mesentery.      He then presented to ED on 1/26 with abdominal pain and associated nausea. CT A/P on 1/26 with 5.2 x 4.5 x 3.6 cm proximal sigmoid mass causnig colonic obstriction with singificant distention of cecum (7cm in diameter), ascending colon and proximal transverse colon. No pneumatosis or pneumoperitoneum. Also small volume of ascites with associated findings concerning for peritoneal carcinomatosis, increased from  prior studies. Dr. Dudley was consulted and recommended no surgical intervention.     He was then seen in clinic on 1/28 and 1/29/2018 as unable to tolerate solid foods and he was getting abdominal cramping and some nausea. He also noticed that his ostomy output decreased. He was given IV fluids as well as antibiotics and started on OxyContin 10 mg twice a day along with p.r.n. oxycodone. Of note a few weeks ago he was started on short acting octreotide to decrease the ostomy output but he stopped taking it about one week ago and few days prior to that he noticed worsening of the abdominal cramping.     Repeat CT scan showed worsening peritoneal carcinomatosis and he is getting more symptomatic in terms of worsening abdominal pain and difficulty eating because of worsening abdominal cramping. He was admitted to the hospital with worsening colon distention and colon stent was placed which improved his symptoms.      He was recently hospitalized from 2/20-2/25/18 with presumed infectious colitis and bacteremia. His port was removed. He was discharged home on IV vancomycin via a PICC line which he completed on 3/7/2018.      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 He was hospitalized with a bowel obstruction from 3/18-3/20/18 and a colonic stent was placed on 3/19/18.  C#8 3/22/18 FOLFOX  C#9 4/5/18 FOLFOX        Repeat CT shows slightly improved disease and less ascites now.   Because of progressive fatigue, we stopped the 5-FU bolus and leucovorin and continued with the 5-FU infusion and oxaliplatin.      C#10 5/4/18  C#11 5/18/18  C#13 5/31/18  C#13 6/15/18     Repeat CT scan showed worsening peritoneal disease. There is also increased fluid in the proximal colon as well as post stent fluid in the rectum. This could be consistent with colitis. Given levofloxacin/flagyl and plan was to switch therapy to irinotecan and panitumumab though was hospitalized on 7/3 for  abdominal pain and bloating as well as rectal bleeding. Found to have colitis with increased fluid dilation of the colon. GI did a flex sig and placed a colon stent for the third time. Other stents were found to have ingrowth of tumor.     Again admitted on 7/24 with SBO and PAULINA. Venting G tube placed which helped with N/V.    Started Irinotecan and Panitumumab on 8/17/18.     Interval History:  Sebas is here today prior to C2. His weight is down about 14lbs since last visit and he feels dehydrated. He increased his IVF to 1L yesterday and today, previously doing 500cc/day. He also started clamping G tube when he drinks fluids. He started imodium day before yesterday. He has been emptying venting bag 2-3 times/day (about 2L each time). He has been drinking more then 64 oz, close to 100 oz/day he thinks. He is emptying ostomy bag less frequently, about 3 times/day, since he started imodium. Previously perhaps 4-5 times/day. He had a small amount of visible blood with rectal output, but volume of rectal output has not increased. He was having some nausea which is relieved with venting bag. He has also taken Zofran, about 3 tablets daily. He had a few days of increased abdominal pain--last Monday, Tuesday. He thinks perhaps he was more active and not eating/drinking as much those days. He used more dilaudid from his PCA with relief of pain. Pain is improved the last couple ays    He denies any dizziness. Has some cramping in hands and calves.      He continues on TPN at night for 12 hours, 8pm-8am.     Review of Systems:  Denies any rashes, cough, dyspnea, chest pain, edema, fever, chills, bleeding/bruising, neuropathy.     Current Outpatient Prescriptions   Medication Sig Dispense Refill     dexamethasone (DECADRON) 4 MG tablet Take 1 tablet (4 mg) by mouth daily 20 tablet 0     fentaNYL (DURAGESIC) 100 mcg/hr 72 hr patch Place 3 patches onto the skin every 72 hours remove old patch. 30 patch 0     hydromorphone HCl  500 MG/50ML SOLN Dilute 250mg in 250ml NS    0.9mg IV every 10 mins as needed    Dispense 1 bag (250mg = 25ml) 25 mL 0     hydromorphone HCl 500 MG/50ML SOLN Dilute 250mg in 250ml NS    0.9mg IV every 10 mins as needed    Dispense 1 bag (250mg = 25ml) 25 mL 0     hydromorphone HCl 500 MG/50ML SOLN Dilute 250mg in 250ml NS    0.9mg IV every 10 mins as needed    Dispense 1 bag (250mg = 25ml) 25 mL 0     hydromorphone HCl 500 MG/50ML SOLN Dilute 250mg in 250ml NS    0.9mg IV every 10 mins as needed    Dispense 1 bag (250mg = 25ml) 25 mL 0     LORazepam (ATIVAN) 0.5 MG tablet Take 1 tablet (0.5 mg) by mouth every 4 hours as needed (Anxiety, Nausea/Vomiting or Sleep) 30 tablet 5     naloxone (NARCAN) nasal spray Spray 1 spray (4 mg) into one nostril alternating nostrils as needed for opioid reversal every 2-3 minutes until assistance arrives (Patient not taking: Reported on 8/9/2018) 0.2 mL 0     ondansetron (ZOFRAN-ODT) 8 MG ODT tab Take 1 tablet (8 mg) by mouth every 8 hours as needed for nausea 60 tablet 5     order for DME Equipment being ordered: home suction machine with tubing for connection to venting G-tube  Treatment Diagnosis: colon adenocarcinoma 1 Device 0     pantoprazole (PROTONIX) 40 MG EC tablet Take 1 tablet (40 mg) by mouth every morning (before breakfast) 30 tablet 0     parenteral nutrition - PTA/DISCHARGE ORDER Inject into the vein daily FVHI       prochlorperazine (COMPAZINE) 10 MG tablet 10 mg every 6 hours as needed        sodium chloride 0.9% SOLN BOLUS Inject 1,000 mLs into the vein daily as needed For hydration. 1000 mL 0       Physical Examination:  General: The patient is a pleasant male in no acute distress.  There were no vitals taken for this visit.  Wt Readings from Last 10 Encounters:   08/24/18 68.2 kg (150 lb 6.4 oz)   08/17/18 70.1 kg (154 lb 8.7 oz)   08/09/18 71.7 kg (158 lb)   08/09/18 71.8 kg (158 lb 6.4 oz)   08/06/18 71.5 kg (157 lb 10.1 oz)   07/24/18 65 kg (143 lb 6.4 oz)    07/20/18 69.9 kg (154 lb 3.2 oz)   07/10/18 73 kg (161 lb)   07/08/18 74.7 kg (164 lb 9.6 oz)   06/28/18 69.2 kg (152 lb 9.6 oz)     HEENT: EOMI, PERRL. Sclerae are anicteric. Oral mucosa is pink and sl dry with no lesions or thrush.   Lymph: Neck is supple with no lymphadenopathy in the cervical or supraclavicular areas.   Heart: Regular rate and rhythm.   Lungs: Clear to auscultation bilaterally.   Abdomen: Bowel sounds present, soft. Ostomy site is clean with liquid, green stool. Venting g tube site is c/d/i. Abdomen with some tenderness and firmness around periumbilical area and LLQ.   Extremities: No lower extremity edema noted bilaterally.   Neuro: Cranial nerves II through XII are grossly intact.  Skin: No rashes, petechiae, or bruising noted on exposed skin. Some tenting of skin on extremities    Laboratory Data:  Results for KARINA MINER (MRN [id number]) as of 8/30/2018 12:37   8/30/2018 08:42   Sodium 128 (L)   Potassium 4.0   Chloride 84 (L)   Carbon Dioxide 35 (H)   Urea Nitrogen 76 (H)   Creatinine 1.28 (H)   GFR Estimate 58 (L)   GFR Estimate If Black 71   Calcium 9.7   Anion Gap 9   Magnesium 2.7 (H)   Phosphorus 4.5   Albumin 3.2 (L)   Protein Total 8.8   Bilirubin Total 0.5   Alkaline Phosphatase 181 (H)   ALT 41   AST 21   Glucose 132 (H)   WBC 5.7   Hemoglobin 11.1 (L)   Hematocrit 33.7 (L)   Platelet Count 465 (H)   RBC Count 4.17 (L)   MCV 81   MCH 26.6   MCHC 32.9   RDW 17.3 (H)   Diff Method Automated Method   % Neutrophils 60.1   % Lymphocytes 24.2   % Monocytes 14.2   % Eosinophils 0.4   % Basophils 0.4   % Immature Granulocytes 0.7   Nucleated RBCs 0   Absolute Neutrophil 3.4   Absolute Lymphocytes 1.4   Absolute Monocytes 0.8   Absolute Eosinophils 0.0   Absolute Basophils 0.0   Abs Immature Granulocytes 0.0   Absolute Nucleated RBC 0.0       Assessment and Plan:    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 Started irinotecan and Panitumumab on 8/17. Last week he  was doing well, but today he is dehydrated. Likely increased GI losses from ostomy and venting G tube  --Stool tested. C diff negative. Enteric panel pending  --Will defer C2 1 week    Renal: Cr up to 1.28 from ~0.7 last week. Na 128 and Cl 84. K, Mag, Phos WNL. Suspect he is dehydrated. He does not document ostomy output, so difficult to obtain I/Os. He also has what sounds like 4-6L losses from venting G tube, although he is also drinking up to 100 oz/day and had not been clamping G tube when drinking. He has been doing 500cc IVF NS at home, and increased to 1L yesterday. No vomiting. Suspect dehydration is secondary to gtube and ostomy output. On TPN daily.  --Will give 2L NS IV today  --Will have him increase IVF at home to 1-2 L/day. Will also set up infusion for additional IVF as he is not always staying on top of dehydration at home. Will schedule 9/2 and 9/5  --Continue scheduled imodium. Recommend he pay closer attention to ostomy output and add lomotil if needed  --Continue po intake. Try to clamp G tube unless he is having more nausea or pain while clamped.     Abdominal pain due to peritoneal carcinomatosis as well as colonic obstruction.  Continue with fentanyl and Dilaudid PCA.  With this regimen his pain is under very decent control currently.  Continue follow-up with palliative care. He is scheduled next on 9/5     Nausea vomiting/nutrition.  His nausea and vomiting significantly improved after placement of venting G-tube.  Currently he is on full liquid diet and he is on TPN.  Continue the same.  Continue Zofran as needed for nausea and vomiting.     Anemia.  This is due to a combination of previous chemotherapy as well as from the cancer causing anemia of chronic disease.  Previously he had evidence of iron deficiency anemia, but soluble transferrin receptor results suggests anemia is more of chronic disease. Hgb improved today to 11.1, but suspect this is concentration from dehydration.    Note:  Cr improved to 1.01 after IVF. Na improved to 131    Addendum: Enteric panel negative    Jacque Dale PA-C  [facility]  [address] [place] [address] [phone]

## 2018-08-30 NOTE — PROGRESS NOTES
This is a recent snapshot of the patient's [facility] medical record.  For current drug dose and complete information and questions, call [phone] or In Basket pool, fv home infusion ([id number])  CSN Number:  [id number]

## 2018-08-30 NOTE — MR AVS SNAPSHOT
After Visit Summary   8/30/2018    Sebas Lopez    MRN: [id number]           Patient Information     Date Of Birth          1965        Visit Information        Provider Department      8/30/2018 8:50 AM Jacque Dale PA [facility]        Today's Diagnoses     Diarrhea, unspecified type    -  1    Cancer of sigmoid colon (H)        Peritoneal carcinomatosis (H)        Dehydration           Follow-ups after your visit        Your next 10 appointments already scheduled     Sep 05, 2018  2:00 PM CDT   (Arrive by 1:45 PM)   Return Visit with Lore Calix MD   [facility] ([facility])    [address]  [address]  [facility] [address] [phone]            Sep 12, 2018  8:00 AM CDT   TELEMEDICINE with Sherry Coronel [facility] ([facility])    [address] [place] [address]  [address]  [facility] [address] [phone]           Note: this is not an onsite visit; there is no need to come to the facility.            Sep 14, 2018 10:30 AM CDT   [facility] Draw with [facility] DRAW   [facility] ([facility])    [address]  [address]  [facility] [address] [phone]            Sep 14, 2018 11:10 AM CDT   (Arrive by 10:55 AM)   Return Visit with Linda Alves PA-C   [facility] ([facility])    [address]  [address]  [place] [address]   [phone]            Sep 14, 2018 12:30 PM CDT   Infusion 240 with [facility] ONCOLOGY INFUSION, UC 20 ATC   [facility] ([facility])    [address]  [address]  [facility] [address] [phone]              Future tests that were ordered for you today     Open Future Orders        Priority Expected Expires Ordered    Basic metabolic  panel Routine 9/2/2018 8/30/2019 8/30/2018            Who to contact     If you have questions or need follow up information about today's clinic visit or your schedule please contact [facility] directly at [phone].  Normal or non-critical lab and imaging results will be communicated to you by MyChart, letter or phone within 4 business days after the clinic has received the results. If you do not hear from us within 7 days, please contact the clinic through [facility] or phone. If you have a critical or abnormal lab result, we will notify you by phone as soon as possible.  Submit refill requests through [facility] or call your pharmacy and they will forward the refill request to us. Please allow 3 business days for your refill to be completed.          Additional Information About Your Visit        [facility] Information     [facility] gives you secure access to your electronic health record. If you see a primary care provider, you can also send messages to your care team and make appointments. If you have questions, please call your primary care clinic.  If you do not have a primary care provider, please call [phone] and they will assist you.        Care EveryWhere ID     This is your Care EveryWhere ID. This could be used by other organizations to access your [facility] medical records  [id number]        Your Vitals Were     Pulse Temperature Pulse Oximetry BMI (Body Mass Index)          91 97.5  F (36.4  C) (Axillary) 98% 20.56 kg/m2         Blood Pressure from Last 3 Encounters:   08/30/18 116/84   08/24/18 119/80   08/17/18 118/80    Weight from Last 3 Encounters:   08/30/18 63.2 kg (139 lb 4.8 oz)   08/24/18 68.2 kg (150 lb 6.4 oz)   08/17/18 70.1 kg (154 lb 8.7 oz)              We Performed the Following     CBC with platelets differential     Comprehensive metabolic panel     Magnesium     Phosphorus     Sodium        Primary Care Provider Office Phone # Fax #    Jaguar Becker MD  [phone]       [facility] [address]  [facility] [id number]        Equal Access to Services     SARAH DUNN : Haddarrian karire reddy cady Funk, wahayderda lumarine, maryta kaalmada lewis, bhargav stearns angelbenita lawrence laNelamino willis. [facility] [phone].    ATENCIÓN: Si habla español, tiene a cid disposición servicios gratuitos de asistencia lingüística. Llame al [phone].    We comply with applicable federal civil rights laws and [place] laws. We do not discriminate on the basis of race, color, national origin, age, disability, sex, sexual orientation, or gender identity.            Thank you!     Thank you for choosing [facility]  for your care. Our goal is always to provide you with excellent care. Hearing back from our patients is one way we can continue to improve our services. Please take a few minutes to complete the written survey that you may receive in the mail after your visit with us. Thank you!             Your Updated Medication List - Protect others around you: Learn how to safely use, store and throw away your medicines at www.disposemymeds.org.          This list is accurate as of 8/30/18  3:52 PM.  Always use your most recent med list.                   Brand Name Dispense Instructions for use Diagnosis    dexamethasone 4 MG tablet    DECADRON    20 tablet    Take 1 tablet (4 mg) by mouth daily    Colon adenocarcinoma (H), Neoplasm related pain       fentaNYL 100 mcg/hr 72 hr patch    DURAGESIC    30 patch    Place 3 patches onto the skin every 72 hours remove old patch.    Colon adenocarcinoma (H), Neoplasm related pain, Peritoneal carcinomatosis (H)       * hydromorphone HCl 500 MG/50ML Soln     25 mL    Dilute 250mg in 250ml NS  0.9mg IV every 10 mins as needed  Dispense 1 bag (250mg = 25ml)    Neoplasm related pain       * hydromorphone HCl 500 MG/50ML Soln     25 mL    Dilute 250mg in 250ml NS  0.9mg IV every 10 mins as needed  Dispense 1 bag (250mg  = 25ml)    Neoplasm related pain       * hydromorphone HCl 500 MG/50ML Soln     25 mL    Dilute 250mg in 250ml NS  0.9mg IV every 10 mins as needed  Dispense 1 bag (250mg = 25ml)    Neoplasm related pain       * hydromorphone HCl 500 MG/50ML Soln     25 mL    Dilute 250mg in 250ml NS  0.9mg IV every 10 mins as needed  Dispense 1 bag (250mg = 25ml)    Neoplasm related pain       loperamide 2 MG capsule    IMODIUM     Take 2 mg by mouth as needed for diarrhea        LORazepam 0.5 MG tablet    ATIVAN    30 tablet    Take 1 tablet (0.5 mg) by mouth every 4 hours as needed (Anxiety, Nausea/Vomiting or Sleep)    Peritoneal carcinomatosis (H), Cancer of sigmoid colon (H)       naloxone nasal spray    NARCAN    0.2 mL    Spray 1 spray (4 mg) into one nostril alternating nostrils as needed for opioid reversal every 2-3 minutes until assistance arrives    Cancer of sigmoid colon (H), Long term current use of opiate analgesic       ondansetron 8 MG ODT tab    ZOFRAN-ODT    60 tablet    Take 1 tablet (8 mg) by mouth every 8 hours as needed for nausea    Nausea       order for DME     1 Device    Equipment being ordered: home suction machine with tubing for connection to venting G-tube Treatment Diagnosis: colon adenocarcinoma    Colon adenocarcinoma (H), Small bowel obstruction       pantoprazole 40 MG EC tablet    PROTONIX    30 tablet    Take 1 tablet (40 mg) by mouth every morning (before breakfast)    History of recent steroid use       parenteral nutrition - PTA/DISCHARGE ORDER      Inject into the vein daily FVHI        prochlorperazine 10 MG tablet    COMPAZINE     10 mg every 6 hours as needed        sodium chloride 0.9% Soln BOLUS     1000 mL    Inject 1,000 mLs into the vein daily as needed For hydration.        * Notice:  This list has 4 medication(s) that are the same as other medications prescribed for you. Read the directions carefully, and ask your doctor or other care provider to review them with you.

## 2018-08-30 NOTE — NURSING NOTE
"Oncology Rooming Note    August 30, 2018 8:54 AM   Sebas Lopez is a 53 year old male who presents for:    Chief Complaint   Patient presents with     Blood Draw     labs drawn via venipuncture by RN     Oncology Clinic Visit     [facility] RETURN- COLON CA     Initial Vitals: /84 (BP Location: Right arm, Patient Position: Chair, Cuff Size: Adult Regular)  Pulse 91  Temp 97.5  F (36.4  C) (Axillary)  Wt 63.2 kg (139 lb 4.8 oz)  SpO2 98%  BMI 20.56 kg/m2 Estimated body mass index is 20.56 kg/(m^2) as calculated from the following:    Height as of 8/24/18: 1.753 m (5' 9.02\").    Weight as of this encounter: 63.2 kg (139 lb 4.8 oz). Body surface area is 1.75 meters squared.  Mild Pain (2) Comment: Data Unavailable   No LMP for male patient.  Allergies reviewed: Yes  Medications reviewed: Yes    Medications: Medication refills not needed today.  Pharmacy name entered into [facility]: [facility] - [place] - [place], [place] - [address] [place] [address]    Clinical concerns: Concerned about weight loss. Has had more diarrhea started taking imodium to help with the loose stools.  Jacque was notified.    10 minutes for nursing intake (face to face time)     Vini Devries LPN            "

## 2018-08-30 NOTE — MR AVS SNAPSHOT
After Visit Summary   8/30/2018    Sebas Lopez    MRN: [id number]           Patient Information     Date Of Birth          1965        Visit Information        Provider Department      8/30/2018 9:30 AM UC 19 ATC;  ONCOLOGY INFUSION [facility]        Today's Diagnoses     Malignant neoplasm of colon, unspecified part of colon (H)    -  1    Diarrhea, unspecified type          Care Instructions      Schedule labs, infusion (IVF) on 9/2. Schedule labs, ELIF (Linda Joselyn if possible) and infusion (IVF) on 9/5. Schedule labs, ELIF and infusion (irinotecan, panitumumab on 9/7.      Contact Numbers    [facility] Main Line: [phone]  [facility] Triage and after hours / weekends / holidays:  [phone]      Please call the triage or after hours line if you experience a temperature greater than or equal to 100.5, shaking chills, have uncontrolled nausea, vomiting and/or diarrhea, dizziness, shortness of breath, chest pain, bleeding, unexplained bruising, or if you have any other new/concerning symptoms, questions or concerns.      If you are having any concerning symptoms or wish to speak to a provider before your next infusion visit, please call your care coordinator or triage to notify them so we can adequately serve you.     If you need a refill on a narcotic prescription or other medication, please call before your infusion appointment.               August 2018 Sunday Monday Tuesday Wednesday Thursday Friday Saturday                  1     IR G TUBE PERCUTANEOUS PLCMNT   11:30 AM   (60 min.)   [id number]   [facility], Interventional Radiology 2     3     4     XR ABDOMEN 2 VIEWS   10:45 AM   (15 min.)   [id number]   [facility],  Radiology   5     6     7     8     9     [facility] RETURN    1:15 PM   (30 min.)   Lorenzo Trujillo MD   [facility] LAB DRAW    3:30 PM   (15 min.)    [facility] LAB DRAW   [facility] Lab Draw     [facility] RETURN    3:45  PM   (30 min.)   Albert Long MD   [facility] 10     11       12     13     14     15     TELEMEDICINE    5:00 PM   (60 min.)   Sherry Coronel [facility] 16     17     [facility] ONC INFUSION 240   12:30 PM   (240 min.)    ONCOLOGY INFUSION   [facility] 18       19     20     21     22     23     24     [facility] DRAW    8:30 AM   (15 min.)   [facility]   [facility] Lab Draw     [facility] RETURN    8:45 AM   (50 min.)   Jacque Dale PA   [facility] 25       26     27     28     29     30     [facility] DRAW    8:15 AM   (15 min.)    [facility] LAB DRAW   [facility] Lab Draw     [facility] RETURN    8:35 AM   (50 min.)   Jacque Dale PA   [facility]     [facility] ONC INFUSION 240    9:30 AM   (240 min.)    ONCOLOGY INFUSION   [facility] 31 September 2018 Sunday Monday Tuesday Wednesday Thursday Friday Saturday                                 1       2     3     4     5     [facility] RETURN    1:45 PM   (45 min.)   Lore Calix MD   [facility] 6     7     8       9     10     11     12     TELEMEDICINE    8:00 AM   (30 min.)   Sherry Coronel [facility] Medication Therapy [facility] 13     14     [facility] DRAW   10:30 AM   (15 min.)    [facility] LAB DRAW   [facility] Lab Draw     [facility] RETURN   10:55 AM   (50 min.)   Linda Alves PA-C   [facility]     [facility] ONC INFUSION 240   12:30 PM   (240 min.)    ONCOLOGY INFUSION   [facility] 15       16     17     18     19     20     21     22       23     24     25     26     27     28     29       30                                               Recent Results (from the past 24 hour(s))   CBC with platelets differential    Collection Time: 08/30/18  8:42 AM   Result Value Ref Range    WBC 5.7 4.0 - 11.0 10e9/L     RBC Count 4.17 (L) 4.4 - 5.9 10e12/L    Hemoglobin 11.1 (L) 13.3 - 17.7 g/dL    Hematocrit 33.7 (L) 40.0 - 53.0 %    MCV 81 78 - 100 fl    MCH 26.6 26.5 - 33.0 pg    MCHC 32.9 31.5 - 36.5 g/dL    RDW 17.3 (H) 10.0 - 15.0 %    Platelet Count 465 (H) 150 - 450 10e9/L    Diff Method Automated Method     % Neutrophils 60.1 %    % Lymphocytes 24.2 %    % Monocytes 14.2 %    % Eosinophils 0.4 %    % Basophils 0.4 %    % Immature Granulocytes 0.7 %    Nucleated RBCs 0 0 /100    Absolute Neutrophil 3.4 1.6 - 8.3 10e9/L    Absolute Lymphocytes 1.4 0.8 - 5.3 10e9/L    Absolute Monocytes 0.8 0.0 - 1.3 10e9/L    Absolute Eosinophils 0.0 0.0 - 0.7 10e9/L    Absolute Basophils 0.0 0.0 - 0.2 10e9/L    Abs Immature Granulocytes 0.0 0 - 0.4 10e9/L    Absolute Nucleated RBC 0.0    Comprehensive metabolic panel    Collection Time: 08/30/18  8:42 AM   Result Value Ref Range    Sodium 128 (L) 133 - 144 mmol/L    Potassium 4.0 3.4 - 5.3 mmol/L    Chloride 84 (L) 94 - 109 mmol/L    Carbon Dioxide 35 (H) 20 - 32 mmol/L    Anion Gap 9 3 - 14 mmol/L    Glucose 132 (H) 70 - 99 mg/dL    Urea Nitrogen 76 (H) 7 - 30 mg/dL    Creatinine 1.28 (H) 0.66 - 1.25 mg/dL    GFR Estimate 58 (L) >60 mL/min/1.7m2    GFR Estimate If Black 71 >60 mL/min/1.7m2    Calcium 9.7 8.5 - 10.1 mg/dL    Bilirubin Total 0.5 0.2 - 1.3 mg/dL    Albumin 3.2 (L) 3.4 - 5.0 g/dL    Protein Total 8.8 6.8 - 8.8 g/dL    Alkaline Phosphatase 181 (H) 40 - 150 U/L    ALT 41 0 - 70 U/L    AST 21 0 - 45 U/L   Magnesium    Collection Time: 08/30/18  8:42 AM   Result Value Ref Range    Magnesium 2.7 (H) 1.6 - 2.3 mg/dL   Phosphorus    Collection Time: 08/30/18  8:42 AM   Result Value Ref Range    Phosphorus 4.5 2.5 - 4.5 mg/dL             August 2018 Sunday Monday Tuesday Wednesday Thursday Friday Saturday                  1     IR G TUBE PERCUTANEOUS PLCMNT   11:30 AM   (60 min.)   [id number]   [facility] [place], Interventional Radiology 2     3     4     XR ABDOMEN 2 VIEWS   10:45 AM    (15 min.)   [email or web address]   [facility] [place],  Radiology   5     6     7     8     9     [facility] RETURN    1:15 PM   (30 min.)   Lorenzo Trujillo MD   [facility]     [facility] DRAW    3:30 PM   (15 min.)   [facility] DRAW   [facility] Lab Draw     [facility] RETURN    3:45 PM   (30 min.)   Albert Long MD   [facility] 10     11       12     13     14     15     TELEMEDICINE    5:00 PM   (60 min.)   Sherry Coronel [facility] Medication Therapy Management 16     17     [facility] ONC INFUSION 240   12:30 PM   (240 min.)   UC ONCOLOGY INFUSION   [facility] 18       19     20     21     22     23     24     [facility] DRAW    8:30 AM   (15 min.)   [facility] DRAW   [facility] Lab Draw     UMP RETURN    8:45 AM   (50 min.)   Jacque Dale PA   [facility] 25       26     27     28     29     30     [facility] DRAW    8:15 AM   (15 min.)   [facility]RAW   [facility] Draw     UMP RETURN    8:35 AM   (50 min.)   Jacque Dale PA   [facility]     [facility] ONC INFUSION 240    9:30 AM   (240 min.)   UC ONCOLOGY INFUSION   [facility] 31 September 2018 Sunday Monday Tuesday Wednesday Thursday Friday Saturday                                 1       2     3     4     5     UMP RETURN    1:45 PM   (45 min.)   Lore Calix MD   [facility] 6     7     8       9     10     11     12     TELEMEDICINE    8:00 AM   (30 min.)   Sherry Coronel [facility] Medication Therapy [facility] 13     14     [facility] DRAW   10:30 AM   (15 min.)   [facility] DRAW   [facility] Lab Draw     UMP RETURN   10:55 AM   (50 min.)   Linda Alves PA-C   [facility]     [facility] ONC INFUSION 240   12:30 PM   (240 min.)   UC ONCOLOGY INFUSION   [facility] 15       16     17     18      19     20     21     22       23     24     25     26     27     28     29       30                                               Recent Results (from the past 24 hour(s))   CBC with platelets differential    Collection Time: 08/30/18  8:42 AM   Result Value Ref Range    WBC 5.7 4.0 - 11.0 10e9/L    RBC Count 4.17 (L) 4.4 - 5.9 10e12/L    Hemoglobin 11.1 (L) 13.3 - 17.7 g/dL    Hematocrit 33.7 (L) 40.0 - 53.0 %    MCV 81 78 - 100 fl    MCH 26.6 26.5 - 33.0 pg    MCHC 32.9 31.5 - 36.5 g/dL    RDW 17.3 (H) 10.0 - 15.0 %    Platelet Count 465 (H) 150 - 450 10e9/L    Diff Method Automated Method     % Neutrophils 60.1 %    % Lymphocytes 24.2 %    % Monocytes 14.2 %    % Eosinophils 0.4 %    % Basophils 0.4 %    % Immature Granulocytes 0.7 %    Nucleated RBCs 0 0 /100    Absolute Neutrophil 3.4 1.6 - 8.3 10e9/L    Absolute Lymphocytes 1.4 0.8 - 5.3 10e9/L    Absolute Monocytes 0.8 0.0 - 1.3 10e9/L    Absolute Eosinophils 0.0 0.0 - 0.7 10e9/L    Absolute Basophils 0.0 0.0 - 0.2 10e9/L    Abs Immature Granulocytes 0.0 0 - 0.4 10e9/L    Absolute Nucleated RBC 0.0    Comprehensive metabolic panel    Collection Time: 08/30/18  8:42 AM   Result Value Ref Range    Sodium 128 (L) 133 - 144 mmol/L    Potassium 4.0 3.4 - 5.3 mmol/L    Chloride 84 (L) 94 - 109 mmol/L    Carbon Dioxide 35 (H) 20 - 32 mmol/L    Anion Gap 9 3 - 14 mmol/L    Glucose 132 (H) 70 - 99 mg/dL    Urea Nitrogen 76 (H) 7 - 30 mg/dL    Creatinine 1.28 (H) 0.66 - 1.25 mg/dL    GFR Estimate 58 (L) >60 mL/min/1.7m2    GFR Estimate If Black 71 >60 mL/min/1.7m2    Calcium 9.7 8.5 - 10.1 mg/dL    Bilirubin Total 0.5 0.2 - 1.3 mg/dL    Albumin 3.2 (L) 3.4 - 5.0 g/dL    Protein Total 8.8 6.8 - 8.8 g/dL    Alkaline Phosphatase 181 (H) 40 - 150 U/L    ALT 41 0 - 70 U/L    AST 21 0 - 45 U/L   Magnesium    Collection Time: 08/30/18  8:42 AM   Result Value Ref Range    Magnesium 2.7 (H) 1.6 - 2.3 mg/dL   Phosphorus    Collection Time: 08/30/18  8:42 AM   Result Value Ref  Range    Phosphorus 4.5 2.5 - 4.5 mg/dL                 Follow-ups after your visit        Your next 10 appointments already scheduled     Sep 05, 2018  2:00 PM CDT   (Arrive by 1:45 PM)   Return Visit with Lore Calix MD   [facility] ([facility])    [address] [place] [address]  [address]  [facility] [address]   [phone]            Sep 12, 2018  8:00 AM CDT   TELEMEDICINE with Sherry Coronel [facility]ication Therapy [facility] ([facility])    [address] [place] [address]  [address]  [facility] [id number] [phone]           Note: this is not an onsite visit; there is no need to come to the facility.            Sep 14, 2018 10:30 AM CDT   [facility] Lab Draw with  [facility] LAB DRAW   [facility] Lab Draw ([facility])    [address] [place] [address]  [address]  [facility] [address] [phone]            Sep 14, 2018 11:10 AM CDT   (Arrive by 10:55 AM)   Return Visit with Linda Alves PA-C   [facility] ([facility])    [address] [place] [address]  [address]  [facility] [id number] [phone]            Sep 14, 2018 12:30 PM CDT   Infusion 240 with  ONCOLOGY INFUSION, UC 20 ATC   [facility] ([facility])    [address] [place] [address]  [address]  [facility] [id number] [phone]              Who to contact     If you have questions or need follow up information about today's clinic visit or your schedule please contact [facility] directly at [phone].  Normal or non-critical lab and imaging results will be communicated to you by MyChart, letter or phone within 4 business days after the clinic has received the results. If you do not hear from us within 7 days, please contact the clinic through MyChart or phone. If you have a critical or abnormal lab result, we will notify you by  phone as soon as possible.  Submit refill requests through [employer] or call your pharmacy and they will forward the refill request to us. Please allow 3 business days for your refill to be completed.          Additional Information About Your Visit        [employer] Information     [employer] gives you secure access to your electronic health record. If you see a primary care provider, you can also send messages to your care team and make appointments. If you have questions, please call your primary care clinic.  If you do not have a primary care provider, please call [phone] and they will assist you.        Care EveryWhere ID     This is your Care EveryWhere ID. This could be used by other organizations to access your [facility] medical records  [id number]         Blood Pressure from Last 3 Encounters:   08/30/18 116/84   08/24/18 119/80   08/17/18 118/80    Weight from Last 3 Encounters:   08/30/18 63.2 kg (139 lb 4.8 oz)   08/24/18 68.2 kg (150 lb 6.4 oz)   08/17/18 70.1 kg (154 lb 8.7 oz)              We Performed the Following     Clostridium difficile toxin B PCR     Creatinine     Enteric Bacteria and Virus Panel by BA Stool        Primary Care Provider Office Phone # Fax #    Jaguar Becker [phone]       [place] [address] [facility] [address]        Equal Access to Services     [facility]: Hadii aad ku hadasho Soomaali, waaxda luqadaha, qaybta kaalmada adeegyada, waxprabhu douglas haymino sage . So [facility] [phone].    ATENCIÓN: Si habla español, tiene a cid disposición servicios gratuitos de asistencia lingüística. Llame al [phone].    We comply with applicable federal civil rights laws and [place] laws. We do not discriminate on the basis of race, color, national origin, age, disability, sex, sexual orientation, or gender identity.            Thank you!     Thank you for choosing [facility]  for your care. Our goal is always to  provide you with excellent care. Hearing back from our patients is one way we can continue to improve our services. Please take a few minutes to complete the written survey that you may receive in the mail after your visit with us. Thank you!             Your Updated Medication List - Protect others around you: Learn how to safely use, store and throw away your medicines at www.disposemymeds.org.          This list is accurate as of 8/30/18 12:12 PM.  Always use your most recent med list.                   Brand Name Dispense Instructions for use Diagnosis    dexamethasone 4 MG tablet    DECADRON    20 tablet    Take 1 tablet (4 mg) by mouth daily    Colon adenocarcinoma (H), Neoplasm related pain       fentaNYL 100 mcg/hr 72 hr patch    DURAGESIC    30 patch    Place 3 patches onto the skin every 72 hours remove old patch.    Colon adenocarcinoma (H), Neoplasm related pain, Peritoneal carcinomatosis (H)       * hydromorphone HCl 500 MG/50ML Soln     25 mL    Dilute 250mg in 250ml NS  0.9mg IV every 10 mins as needed  Dispense 1 bag (250mg = 25ml)    Neoplasm related pain       * hydromorphone HCl 500 MG/50ML Soln     25 mL    Dilute 250mg in 250ml NS  0.9mg IV every 10 mins as needed  Dispense 1 bag (250mg = 25ml)    Neoplasm related pain       * hydromorphone HCl 500 MG/50ML Soln     25 mL    Dilute 250mg in 250ml NS  0.9mg IV every 10 mins as needed  Dispense 1 bag (250mg = 25ml)    Neoplasm related pain       * hydromorphone HCl 500 MG/50ML Soln     25 mL    Dilute 250mg in 250ml NS  0.9mg IV every 10 mins as needed  Dispense 1 bag (250mg = 25ml)    Neoplasm related pain       loperamide 2 MG capsule    IMODIUM     Take 2 mg by mouth as needed for diarrhea        LORazepam 0.5 MG tablet    ATIVAN    30 tablet    Take 1 tablet (0.5 mg) by mouth every 4 hours as needed (Anxiety, Nausea/Vomiting or Sleep)    Peritoneal carcinomatosis (H), Cancer of sigmoid colon (H)       naloxone nasal spray    NARCAN    0.2 mL     Spray 1 spray (4 mg) into one nostril alternating nostrils as needed for opioid reversal every 2-3 minutes until assistance arrives    Cancer of sigmoid colon (H), Long term current use of opiate analgesic       ondansetron 8 MG ODT tab    ZOFRAN-ODT    60 tablet    Take 1 tablet (8 mg) by mouth every 8 hours as needed for nausea    Nausea       order for DME     1 Device    Equipment being ordered: home suction machine with tubing for connection to venting G-tube Treatment Diagnosis: colon adenocarcinoma    Colon adenocarcinoma (H), Small bowel obstruction       pantoprazole 40 MG EC tablet    PROTONIX    30 tablet    Take 1 tablet (40 mg) by mouth every morning (before breakfast)    History of recent steroid use       parenteral nutrition - PTA/DISCHARGE ORDER      Inject into the vein daily [facility]        prochlorperazine 10 MG tablet    COMPAZINE     10 mg every 6 hours as needed        sodium chloride 0.9% Soln BOLUS     1000 mL    Inject 1,000 mLs into the vein daily as needed For hydration.        * Notice:  This list has 4 medication(s) that are the same as other medications prescribed for you. Read the directions carefully, and ask your doctor or other care provider to review them with you.

## 2018-08-30 NOTE — PROGRESS NOTES
Infusion Nursing Note:  Sebas Lopez presents today for Cycle 2 Day 1 Irinotecan and Erbitux (This was deferred by one week see below)      Patient seen and examined by Jacque DE LUNA in clinic prior to infusion.      Intravenous Access:  PICC.    Treatment Conditions:  Lab Results   Component Value Date    HGB 11.1 08/30/2018     Lab Results   Component Value Date    WBC 5.7 08/30/2018      Lab Results   Component Value Date    ANEU 3.4 08/30/2018     Lab Results   Component Value Date     08/30/2018      Lab Results   Component Value Date     08/30/2018                   Lab Results   Component Value Date    POTASSIUM 4.0 08/30/2018           Lab Results   Component Value Date    MAG 2.7 08/30/2018            Lab Results   Component Value Date    CR 1.28 08/30/2018                   Lab Results   Component Value Date    ANNAMARIE 9.7 08/30/2018                Lab Results   Component Value Date    BILITOTAL 0.5 08/30/2018           Lab Results   Component Value Date    ALBUMIN 3.2 08/30/2018                    Lab Results   Component Value Date    ALT 41 08/30/2018           Lab Results   Component Value Date    AST 21 08/30/2018       Note: Today pt's crt was elevated.  Per Jacque she does not want pt to receive chemotherapy today; cycle 2 day 1 will be delayed by one week.  2L of NS infused without incident. Prior to discharge a crt level was drawn and stool samples were sent from ostomy per Jacque's orders        Post Infusion Assessment:  Patient tolerated infusion without incident.    Discharge Plan:   Patient declined prescription refills.  Copy of AVS reviewed with patient and/or family.  Per Jacque plan for pt to return 9/2/18 for IV fluids ; 9/5/18 to see an ELIF and have IV fluids, and 9/7/18 for cycle 2.  These appointments have not been made.  Pt is aware that these appointments will be scheduled and somebody from scheduling will be calling him  Face to Face time: 3.    Saumya Lawson,  RN

## 2018-08-30 NOTE — NURSING NOTE
Chief Complaint   Patient presents with     Blood Draw     labs drawn via venipuncture by RN     /84 (BP Location: Right arm, Patient Position: Chair, Cuff Size: Adult Regular)  Pulse 91  Temp 97.5  F (36.4  C) (Axillary)  Wt 63.2 kg (139 lb 4.8 oz)  SpO2 98%  BMI 20.56 kg/m2    Vitals taken.  Labs collected and sent from left antecubital venipuncture. Pt tolerated well. Pt checked in for next appointment.    Yohana Baca RN

## 2018-08-30 NOTE — PATIENT INSTRUCTIONS
Schedule labs, infusion (IVF) on 9/2. Schedule labs, ELIF (Linda Joselyn if possible) and infusion (IVF) on 9/5. Schedule labs, ELIF and infusion (irinotecan, panitumumab on 9/7.      Contact Numbers    [facility] Main Line: [phone]  [facility] Triage and after hours / weekends / holidays:  [phone]      Please call the triage or after hours line if you experience a temperature greater than or equal to 100.5, shaking chills, have uncontrolled nausea, vomiting and/or diarrhea, dizziness, shortness of breath, chest pain, bleeding, unexplained bruising, or if you have any other new/concerning symptoms, questions or concerns.      If you are having any concerning symptoms or wish to speak to a provider before your next infusion visit, please call your care coordinator or triage to notify them so we can adequately serve you.     If you need a refill on a narcotic prescription or other medication, please call before your infusion appointment.               August 2018 Sunday Monday Tuesday Wednesday Thursday Friday Saturday                  1     IR G TUBE PERCUTANEOUS PLCMNT   11:30 AM   (60 min.)   [id number]   [facility], Interventional Radiology 2     3     4     XR ABDOMEN 2 VIEWS   10:45 AM   (15 min.)   [id number]   [facility],  Radiology   5     6     7     8     9     [facility] RETURN    1:15 PM   (30 min.)   Lorenzo Trujillo MD   [facility] LAB DRAW    3:30 PM   (15 min.)    [facility] DRAW   [facility] Lab Draw     [facility] RETURN    3:45 PM   (30 min.)   Albert Long MD   [facility] 10     11       12     13     14     15     TELEMEDICINE    5:00 PM   (60 min.)   Sherry Coronel [facility] Medication Therapy Management 16     17     [facility] ONC INFUSION 240   12:30 PM   (240 min.)    ONCOLOGY INFUSION   [facility] 18       19     20     21     22     23     24     [facility] DRAW    8:30 AM   (15 min.)   [facility]     [facility] RETURN    8:45 AM   (50 min.)   Jacque Dale PA   [facility] 25       26     27     28     29     30     [facility] DRAW    8:15 AM   (15 min.)   [facility] D[facility]   [facility] Lab Draw     [facility] RETURN    8:35 AM   (50 min.)   Jacque Dale PA   [facility]     [facility] ONC INFUSION 240    9:30 AM   (240 min.)   [facility] ONCOLOGY INFUSION   [facility] 31 September 2018 Sunday Monday Tuesday Wednesday Thursday Friday Saturday                                 1       2     3     4     5     [facility] RETURN    1:45 PM   (45 min.)   Lore Calix MD   [facility] 6     7     8       9     10     11     12     TELEMEDICINE    8:00 AM   (30 min.)   Sherry Coronel [facility] Medication Therapy Management 13     14     [facility] DRAW   10:30 AM   (15 min.)   [facility] DRAW   [facility] Lab Draw     [facility] RETURN   10:55 AM   (50 min.)   Linda Alves PA-C   [facility]     [facility] ONC INFUSION 240   12:30 PM   (240 min.)   [facility] ONCOLOGY INFUSION   [facility] 15       16     17     18     19     20     21     22       23     24     25     26     27     28     29       30                                               Recent Results (from the past 24 hour(s))   CBC with platelets differential    Collection Time: 08/30/18  8:42 AM   Result Value Ref Range    WBC 5.7 4.0 - 11.0 10e9/L    RBC Count 4.17 (L) 4.4 - 5.9 10e12/L    Hemoglobin 11.1 (L) 13.3 - 17.7 g/dL    Hematocrit 33.7 (L) 40.0 - 53.0 %    MCV 81 78 - 100 fl    MCH 26.6 26.5 - 33.0 pg    MCHC 32.9 31.5 - 36.5 g/dL    RDW 17.3 (H) 10.0 - 15.0 %    Platelet Count 465 (H) 150 - 450 10e9/L    Diff Method Automated Method     % Neutrophils 60.1 %    % Lymphocytes 24.2 %    % Monocytes 14.2 %    % Eosinophils 0.4 %    % Basophils 0.4 %    % Immature Granulocytes 0.7  %    Nucleated RBCs 0 0 /100    Absolute Neutrophil 3.4 1.6 - 8.3 10e9/L    Absolute Lymphocytes 1.4 0.8 - 5.3 10e9/L    Absolute Monocytes 0.8 0.0 - 1.3 10e9/L    Absolute Eosinophils 0.0 0.0 - 0.7 10e9/L    Absolute Basophils 0.0 0.0 - 0.2 10e9/L    Abs Immature Granulocytes 0.0 0 - 0.4 10e9/L    Absolute Nucleated RBC 0.0    Comprehensive metabolic panel    Collection Time: 08/30/18  8:42 AM   Result Value Ref Range    Sodium 128 (L) 133 - 144 mmol/L    Potassium 4.0 3.4 - 5.3 mmol/L    Chloride 84 (L) 94 - 109 mmol/L    Carbon Dioxide 35 (H) 20 - 32 mmol/L    Anion Gap 9 3 - 14 mmol/L    Glucose 132 (H) 70 - 99 mg/dL    Urea Nitrogen 76 (H) 7 - 30 mg/dL    Creatinine 1.28 (H) 0.66 - 1.25 mg/dL    GFR Estimate 58 (L) >60 mL/min/1.7m2    GFR Estimate If Black 71 >60 mL/min/1.7m2    Calcium 9.7 8.5 - 10.1 mg/dL    Bilirubin Total 0.5 0.2 - 1.3 mg/dL    Albumin 3.2 (L) 3.4 - 5.0 g/dL    Protein Total 8.8 6.8 - 8.8 g/dL    Alkaline Phosphatase 181 (H) 40 - 150 U/L    ALT 41 0 - 70 U/L    AST 21 0 - 45 U/L   Magnesium    Collection Time: 08/30/18  8:42 AM   Result Value Ref Range    Magnesium 2.7 (H) 1.6 - 2.3 mg/dL   Phosphorus    Collection Time: 08/30/18  8:42 AM   Result Value Ref Range    Phosphorus 4.5 2.5 - 4.5 mg/dL             August 2018 Sunday Monday Tuesday Wednesday Thursday Friday Saturday                  1     IR G TUBE PERCUTANEOUS PLCMNT   11:30 AM   (60 min.)   [id number]   [facility], Interventional Radiology 2     3     4     XR ABDOMEN 2 VIEWS   10:45 AM   (15 min.)   [email or web address]   [facility],  Radiology   5     6     7     8     9     [facility] RETURN    1:15 PM   (30 min.)   [facility]    3:30 PM   (15 min.)    [facility]   [facility] Lab Draw     [facility] RETURN    3:45 PM   (30 min.)   Albert Long MD   [facility] 10     11       12     13     14     15     TELEMEDICINE    5:00 PM   (60 min.)    Sherry Coronel [facility] Medication Therapy Management 16     17     [facility] ONC INFUSION 240   12:30 PM   (240 min.)   [facility] ONCOLOGY INFUSION   [facility] 18       19     20     21     22     23     24     [facility] DRAW    8:30 AM   (15 min.)   [facility]     [facility] RETURN    8:45 AM   (50 min.)   Jacque Dale PA   [facility] 25       26     27     28     29     30     [facility] DRAW    8:15 AM   (15 min.)   [facility] DRAW   [facility] Lab Draw     [facility] RETURN    8:35 AM   (50 min.)   Jacque Dale PA   [facility]     [facility] ONC INFUSION 240    9:30 AM   (240 min.)    ONCOLOGY INFUSION   [facility] 31 September 2018 Sunday Monday Tuesday Wednesday Thursday Friday Saturday                                 1       2     3     4     5     [facility] RETURN    1:45 PM   (45 min.)   Lore Calix MD   [facility] 6     7     8       9     10     11     12     TELEMEDICINE    8:00 AM   (30 min.)   Sherry Coronel [facility] Medication Therapy Management 13     14     [facility] DRAW   10:30 AM   (15 min.)   [facility]     [facility] RETURN   10:55 AM   (50 min.)   Linda Alves PA-C   [facility]     [facility] ONC INFUSION 240   12:30 PM   (240 min.)   [facility] ONCOLOGY INFUSION   [facility] 15       16     17     18     19     20     21     22       23     24     25     26     27     28     29       30                                               Recent Results (from the past 24 hour(s))   CBC with platelets differential    Collection Time: 08/30/18  8:42 AM   Result Value Ref Range    WBC 5.7 4.0 - 11.0 10e9/L    RBC Count 4.17 (L) 4.4 - 5.9 10e12/L    Hemoglobin 11.1 (L) 13.3 - 17.7 g/dL    Hematocrit 33.7 (L) 40.0 - 53.0 %    MCV 81 78 - 100 fl    MCH  26.6 26.5 - 33.0 pg    MCHC 32.9 31.5 - 36.5 g/dL    RDW 17.3 (H) 10.0 - 15.0 %    Platelet Count 465 (H) 150 - 450 10e9/L    Diff Method Automated Method     % Neutrophils 60.1 %    % Lymphocytes 24.2 %    % Monocytes 14.2 %    % Eosinophils 0.4 %    % Basophils 0.4 %    % Immature Granulocytes 0.7 %    Nucleated RBCs 0 0 /100    Absolute Neutrophil 3.4 1.6 - 8.3 10e9/L    Absolute Lymphocytes 1.4 0.8 - 5.3 10e9/L    Absolute Monocytes 0.8 0.0 - 1.3 10e9/L    Absolute Eosinophils 0.0 0.0 - 0.7 10e9/L    Absolute Basophils 0.0 0.0 - 0.2 10e9/L    Abs Immature Granulocytes 0.0 0 - 0.4 10e9/L    Absolute Nucleated RBC 0.0    Comprehensive metabolic panel    Collection Time: 08/30/18  8:42 AM   Result Value Ref Range    Sodium 128 (L) 133 - 144 mmol/L    Potassium 4.0 3.4 - 5.3 mmol/L    Chloride 84 (L) 94 - 109 mmol/L    Carbon Dioxide 35 (H) 20 - 32 mmol/L    Anion Gap 9 3 - 14 mmol/L    Glucose 132 (H) 70 - 99 mg/dL    Urea Nitrogen 76 (H) 7 - 30 mg/dL    Creatinine 1.28 (H) 0.66 - 1.25 mg/dL    GFR Estimate 58 (L) >60 mL/min/1.7m2    GFR Estimate If Black 71 >60 mL/min/1.7m2    Calcium 9.7 8.5 - 10.1 mg/dL    Bilirubin Total 0.5 0.2 - 1.3 mg/dL    Albumin 3.2 (L) 3.4 - 5.0 g/dL    Protein Total 8.8 6.8 - 8.8 g/dL    Alkaline Phosphatase 181 (H) 40 - 150 U/L    ALT 41 0 - 70 U/L    AST 21 0 - 45 U/L   Magnesium    Collection Time: 08/30/18  8:42 AM   Result Value Ref Range    Magnesium 2.7 (H) 1.6 - 2.3 mg/dL   Phosphorus    Collection Time: 08/30/18  8:42 AM   Result Value Ref Range    Phosphorus 4.5 2.5 - 4.5 mg/dL

## 2018-08-30 NOTE — LETTER
8/30/2018    RE: Sebas Lopez  [address] [place] [address]       Oncology/Hematology Visit Note  Aug 30, 2018    Reason for Visit: follow up of stage IV sigmoid adenocarcinoma    History of Present Illness:   Please see previous note for details. I have copied and updated from prior notes.   Sebas is a 53-year-old male who has a history of ulcerative colitis diagnosed more than 20 years ago, but he has not had medical management for that.  He was doing well, but in 05/2017 he presented with a few weeks of abdominal pain.  At that time, his imaging showed that he had a sigmoid wall thickening with adjacent mesenteric lymphadenopathy and free fluid near the abdominal wall mesh from his prior hernia surgery.  He subsequently underwent a colonoscopy which was incomplete and showed an obstructing sigmoid colon mass.  The biopsy from the sigmoid mass showed sigmoid adenocarcinoma.  He then developed obstructive symptoms and underwent exploratory laparotomy on 07/10/2017.  During that he was found to have diffuse peritoneal carcinomatosis.  Extensive lysis of adhesions was performed and a small bowel resection was performed with end ileostomy.  The biopsies from the multiple large peritoneal metastasis also were positive for metastatic adenocarcinoma.       He started palliative FOLFOX on 8/11/17. C#6 given on 10/26/17  After 6 cycles, he has stable disease     On 12/6/17, he had Diagnostic laparoscopy and Exploratory laparotomy, but HIPEC was not performed as Peritoneal cancer index was 26 with diffuse involvement of the small bowel as well as the small bowel mesentery.      He then presented to ED on 1/26 with abdominal pain and associated nausea. CT A/P on 1/26 with 5.2 x 4.5 x 3.6 cm proximal sigmoid mass causnig colonic obstriction with singificant distention of cecum (7cm in diameter), ascending colon and proximal transverse colon. No pneumatosis or pneumoperitoneum. Also small volume of ascites with  associated findings concerning for peritoneal carcinomatosis, increased from prior studies. Dr. Dudley was consulted and recommended no surgical intervention.     He was then seen in clinic on 1/28 and 1/29/2018 as unable to tolerate solid foods and he was getting abdominal cramping and some nausea. He also noticed that his ostomy output decreased. He was given IV fluids as well as antibiotics and started on OxyContin 10 mg twice a day along with p.r.n. oxycodone. Of note a few weeks ago he was started on short acting octreotide to decrease the ostomy output but he stopped taking it about one week ago and few days prior to that he noticed worsening of the abdominal cramping.     Repeat CT scan showed worsening peritoneal carcinomatosis and he is getting more symptomatic in terms of worsening abdominal pain and difficulty eating because of worsening abdominal cramping. He was admitted to the hospital with worsening colon distention and colon stent was placed which improved his symptoms.      He was recently hospitalized from 2/20-2/25/18 with presumed infectious colitis and bacteremia. His port was removed. He was discharged home on IV vancomycin via a PICC line which he completed on 3/7/2018.      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 He was hospitalized with a bowel obstruction from 3/18-3/20/18 and a colonic stent was placed on 3/19/18.  C#8 3/22/18 FOLFOX  C#9 4/5/18 FOLFOX        Repeat CT shows slightly improved disease and less ascites now.   Because of progressive fatigue, we stopped the 5-FU bolus and leucovorin and continued with the 5-FU infusion and oxaliplatin.      C#10 5/4/18  C#11 5/18/18  C#13 5/31/18  C#13 6/15/18     Repeat CT scan showed worsening peritoneal disease. There is also increased fluid in the proximal colon as well as post stent fluid in the rectum. This could be consistent with colitis. Given levofloxacin/flagyl and plan was to  switch therapy to irinotecan and panitumumab though was hospitalized on 7/3 for abdominal pain and bloating as well as rectal bleeding. Found to have colitis with increased fluid dilation of the colon. GI did a flex sig and placed a colon stent for the third time. Other stents were found to have ingrowth of tumor.     Again admitted on 7/24 with SBO and PAULINA. Venting G tube placed which helped with N/V.    Started Irinotecan and Panitumumab on 8/17/18.     Interval History:  Sebas is here today prior to C2. His weight is down about 14lbs since last visit and he feels dehydrated. He increased his IVF to 1L yesterday and today, previously doing 500cc/day. He also started clamping G tube when he drinks fluids. He started imodium day before yesterday. He has been emptying venting bag 2-3 times/day (about 2L each time). He has been drinking more then 64 oz, close to 100 oz/day he thinks. He is emptying ostomy bag less frequently, about 3 times/day, since he started imodium. Previously perhaps 4-5 times/day. He had a small amount of visible blood with rectal output, but volume of rectal output has not increased. He was having some nausea which is relieved with venting bag. He has also taken Zofran, about 3 tablets daily. He had a few days of increased abdominal pain--last Monday, Tuesday. He thinks perhaps he was more active and not eating/drinking as much those days. He used more dilaudid from his PCA with relief of pain. Pain is improved the last couple ays    He denies any dizziness. Has some cramping in hands and calves.      He continues on TPN at night for 12 hours, 8pm-8am.     Review of Systems:  Denies any rashes, cough, dyspnea, chest pain, edema, fever, chills, bleeding/bruising, neuropathy.     Current Outpatient Prescriptions   Medication Sig Dispense Refill     dexamethasone (DECADRON) 4 MG tablet Take 1 tablet (4 mg) by mouth daily 20 tablet 0     fentaNYL (DURAGESIC) 100 mcg/hr 72 hr patch Place 3 patches  onto the skin every 72 hours remove old patch. 30 patch 0     hydromorphone HCl 500 MG/50ML SOLN Dilute 250mg in 250ml NS    0.9mg IV every 10 mins as needed    Dispense 1 bag (250mg = 25ml) 25 mL 0     hydromorphone HCl 500 MG/50ML SOLN Dilute 250mg in 250ml NS    0.9mg IV every 10 mins as needed    Dispense 1 bag (250mg = 25ml) 25 mL 0     hydromorphone HCl 500 MG/50ML SOLN Dilute 250mg in 250ml NS    0.9mg IV every 10 mins as needed    Dispense 1 bag (250mg = 25ml) 25 mL 0     hydromorphone HCl 500 MG/50ML SOLN Dilute 250mg in 250ml NS    0.9mg IV every 10 mins as needed    Dispense 1 bag (250mg = 25ml) 25 mL 0     LORazepam (ATIVAN) 0.5 MG tablet Take 1 tablet (0.5 mg) by mouth every 4 hours as needed (Anxiety, Nausea/Vomiting or Sleep) 30 tablet 5     naloxone (NARCAN) nasal spray Spray 1 spray (4 mg) into one nostril alternating nostrils as needed for opioid reversal every 2-3 minutes until assistance arrives (Patient not taking: Reported on 8/9/2018) 0.2 mL 0     ondansetron (ZOFRAN-ODT) 8 MG ODT tab Take 1 tablet (8 mg) by mouth every 8 hours as needed for nausea 60 tablet 5     order for DME Equipment being ordered: home suction machine with tubing for connection to venting G-tube  Treatment Diagnosis: colon adenocarcinoma 1 Device 0     pantoprazole (PROTONIX) 40 MG EC tablet Take 1 tablet (40 mg) by mouth every morning (before breakfast) 30 tablet 0     parenteral nutrition - PTA/DISCHARGE ORDER Inject into the vein daily FVHI       prochlorperazine (COMPAZINE) 10 MG tablet 10 mg every 6 hours as needed        sodium chloride 0.9% SOLN BOLUS Inject 1,000 mLs into the vein daily as needed For hydration. 1000 mL 0       Physical Examination:  General: The patient is a pleasant male in no acute distress.  There were no vitals taken for this visit.  Wt Readings from Last 10 Encounters:   08/24/18 68.2 kg (150 lb 6.4 oz)   08/17/18 70.1 kg (154 lb 8.7 oz)   08/09/18 71.7 kg (158 lb)   08/09/18 71.8 kg (158 lb  6.4 oz)   08/06/18 71.5 kg (157 lb 10.1 oz)   07/24/18 65 kg (143 lb 6.4 oz)   07/20/18 69.9 kg (154 lb 3.2 oz)   07/10/18 73 kg (161 lb)   07/08/18 74.7 kg (164 lb 9.6 oz)   06/28/18 69.2 kg (152 lb 9.6 oz)     HEENT: EOMI, PERRL. Sclerae are anicteric. Oral mucosa is pink and sl dry with no lesions or thrush.   Lymph: Neck is supple with no lymphadenopathy in the cervical or supraclavicular areas.   Heart: Regular rate and rhythm.   Lungs: Clear to auscultation bilaterally.   Abdomen: Bowel sounds present, soft. Ostomy site is clean with liquid, green stool. Venting g tube site is c/d/i. Abdomen with some tenderness and firmness around periumbilical area and LLQ.   Extremities: No lower extremity edema noted bilaterally.   Neuro: Cranial nerves II through XII are grossly intact.  Skin: No rashes, petechiae, or bruising noted on exposed skin. Some tenting of skin on extremities    Laboratory Data:  Results for KARINA MINER (MRN [id number]) as of 8/30/2018 12:37   8/30/2018 08:42   Sodium 128 (L)   Potassium 4.0   Chloride 84 (L)   Carbon Dioxide 35 (H)   Urea Nitrogen 76 (H)   Creatinine 1.28 (H)   GFR Estimate 58 (L)   GFR Estimate If Black 71   Calcium 9.7   Anion Gap 9   Magnesium 2.7 (H)   Phosphorus 4.5   Albumin 3.2 (L)   Protein Total 8.8   Bilirubin Total 0.5   Alkaline Phosphatase 181 (H)   ALT 41   AST 21   Glucose 132 (H)   WBC 5.7   Hemoglobin 11.1 (L)   Hematocrit 33.7 (L)   Platelet Count 465 (H)   RBC Count 4.17 (L)   MCV 81   MCH 26.6   MCHC 32.9   RDW 17.3 (H)   Diff Method Automated Method   % Neutrophils 60.1   % Lymphocytes 24.2   % Monocytes 14.2   % Eosinophils 0.4   % Basophils 0.4   % Immature Granulocytes 0.7   Nucleated RBCs 0   Absolute Neutrophil 3.4   Absolute Lymphocytes 1.4   Absolute Monocytes 0.8   Absolute Eosinophils 0.0   Absolute Basophils 0.0   Abs Immature Granulocytes 0.0   Absolute Nucleated RBC 0.0       Assessment and Plan:    Stage IV sigmoid adenocarcinoma with  peritoneal metastasis. Started irinotecan and Panitumumab on 8/17. Last week he was doing well, but today he is dehydrated. Likely increased GI losses from ostomy and venting G tube  --Stool tested. C diff negative. Enteric panel pending  --Will defer C2 1 week    Renal: Cr up to 1.28 from ~0.7 last week. Na 128 and Cl 84. K, Mag, Phos WNL. Suspect he is dehydrated. He does not document ostomy output, so difficult to obtain I/Os. He also has what sounds like 4-6L losses from venting G tube, although he is also drinking up to 100 oz/day and had not been clamping G tube when drinking. He has been doing 500cc IVF NS at home, and increased to 1L yesterday. No vomiting. Suspect dehydration is secondary to gtube and ostomy output. On TPN daily.  --Will give 2L NS IV today  --Will have him increase IVF at home to 1-2 L/day. Will also set up infusion for additional IVF as he is not always staying on top of dehydration at home. Will schedule 9/2 and 9/5  --Continue scheduled imodium. Recommend he pay closer attention to ostomy output and add lomotil if needed  --Continue po intake. Try to clamp G tube unless he is having more nausea or pain while clamped.     Abdominal pain due to peritoneal carcinomatosis as well as colonic obstruction.  Continue with fentanyl and Dilaudid PCA.  With this regimen his pain is under very decent control currently.  Continue follow-up with palliative care. He is scheduled next on 9/5     Nausea vomiting/nutrition.  His nausea and vomiting significantly improved after placement of venting G-tube.  Currently he is on full liquid diet and he is on TPN.  Continue the same.  Continue Zofran as needed for nausea and vomiting.     Anemia.  This is due to a combination of previous chemotherapy as well as from the cancer causing anemia of chronic disease.  Previously he had evidence of iron deficiency anemia, but soluble transferrin receptor results suggests anemia is more of chronic disease. Hgb  improved today to 11.1, but suspect this is concentration from dehydration.    Note: Cr improved to 1.01 after IVF. Na improved to 131    Addendum: Enteric panel negative    Jacque Dale PA-C  [facility]  [address] [place] [id number] [phone]

## 2018-08-30 NOTE — Clinical Note
8/30/2018       RE: Sebas Lopez  [address] [place] [address]     Dear Colleague,    Thank you for referring your patient, Sebas Lopez, to the [facility]. Please see a copy of my visit note below.    Oncology/Hematology Visit Note  Aug 30, 2018    Reason for Visit: follow up of stage IV sigmoid adenocarcinoma    History of Present Illness:   Please see previous note for details. I have copied and updated from prior notes.   Sebas is a 53-year-old male who has a history of ulcerative colitis diagnosed more than 20 years ago, but he has not had medical management for that.  He was doing well, but in 05/2017 he presented with a few weeks of abdominal pain.  At that time, his imaging showed that he had a sigmoid wall thickening with adjacent mesenteric lymphadenopathy and free fluid near the abdominal wall mesh from his prior hernia surgery.  He subsequently underwent a colonoscopy which was incomplete and showed an obstructing sigmoid colon mass.  The biopsy from the sigmoid mass showed sigmoid adenocarcinoma.  He then developed obstructive symptoms and underwent exploratory laparotomy on 07/10/2017.  During that he was found to have diffuse peritoneal carcinomatosis.  Extensive lysis of adhesions was performed and a small bowel resection was performed with end ileostomy.  The biopsies from the multiple large peritoneal metastasis also were positive for metastatic adenocarcinoma.       He started palliative FOLFOX on 8/11/17. C#6 given on 10/26/17  After 6 cycles, he has stable disease     On 12/6/17, he had Diagnostic laparoscopy and Exploratory laparotomy, but HIPEC was not performed as Peritoneal cancer index was 26 with diffuse involvement of the small bowel as well as the small bowel mesentery.      He then presented to ED on 1/26 with abdominal pain and associated nausea. CT A/P on 1/26 with 5.2 x 4.5 x 3.6 cm proximal sigmoid mass causnig colonic obstriction with  singificant distention of cecum (7cm in diameter), ascending colon and proximal transverse colon. No pneumatosis or pneumoperitoneum. Also small volume of ascites with associated findings concerning for peritoneal carcinomatosis, increased from prior studies. Dr. Dudley was consulted and recommended no surgical intervention.     He was then seen in clinic on 1/28 and 1/29/2018 as unable to tolerate solid foods and he was getting abdominal cramping and some nausea. He also noticed that his ostomy output decreased. He was given IV fluids as well as antibiotics and started on OxyContin 10 mg twice a day along with p.r.n. oxycodone. Of note a few weeks ago he was started on short acting octreotide to decrease the ostomy output but he stopped taking it about one week ago and few days prior to that he noticed worsening of the abdominal cramping.     Repeat CT scan showed worsening peritoneal carcinomatosis and he is getting more symptomatic in terms of worsening abdominal pain and difficulty eating because of worsening abdominal cramping. He was admitted to the hospital with worsening colon distention and colon stent was placed which improved his symptoms.      He was recently hospitalized from 2/20-2/25/18 with presumed infectious colitis and bacteremia. His port was removed. He was discharged home on IV vancomycin via a PICC line which he completed on 3/7/2018.      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 He was hospitalized with a bowel obstruction from 3/18-3/20/18 and a colonic stent was placed on 3/19/18.  C#8 3/22/18 FOLFOX  C#9 4/5/18 FOLFOX        Repeat CT shows slightly improved disease and less ascites now.   Because of progressive fatigue, we stopped the 5-FU bolus and leucovorin and continued with the 5-FU infusion and oxaliplatin.      C#10 5/4/18  C#11 5/18/18  C#13 5/31/18  C#13 6/15/18     Repeat CT scan showed worsening peritoneal disease. There is  also increased fluid in the proximal colon as well as post stent fluid in the rectum. This could be consistent with colitis. Given levofloxacin/flagyl and plan was to switch therapy to irinotecan and panitumumab though was hospitalized on 7/3 for abdominal pain and bloating as well as rectal bleeding. Found to have colitis with increased fluid dilation of the colon. GI did a flex sig and placed a colon stent for the third time. Other stents were found to have ingrowth of tumor.     Again admitted on 7/24 with SBO and PAULINA. Venting G tube placed which helped with N/V.    Started Irinotecan and Panitumumab on 8/17/18.     Interval History:  Sebas is here today prior to C2. His weight is down about 14lbs since last visit and he feels dehydrated. He increased his IVF to 1L yesterday and today, previously doing 500cc/day. He also started clamping G tube when he drinks fluids. He started imodium day before yesterday. He has been emptying venting bag 2-3 times/day (about 2L each time). He has been drinking more then 64 oz, close to 100 oz/day he thinks. He is emptying ostomy bag less frequently, about 3 times/day, since he started imodium. Previously perhaps 4-5 times/day. He had a small amount of visible blood with rectal output, but volume of rectal output has not increased. He was having some nausea which is relieved with venting bag. He has also taken Zofran, about 3 tablets daily. He had a few days of increased abdominal pain--last Monday, Tuesday. He thinks perhaps he was more active and not eating/drinking as much those days. He used more dilaudid from his PCA with relief of pain. Pain is improved the last couple ays    He denies any dizziness. Has some cramping in hands and calves.      He continues on TPN at night for 12 hours, 8pm-8am.     Review of Systems:  Denies any rashes, cough, dyspnea, chest pain, edema, fever, chills, bleeding/bruising, neuropathy.     Current Outpatient Prescriptions   Medication Sig  Dispense Refill     dexamethasone (DECADRON) 4 MG tablet Take 1 tablet (4 mg) by mouth daily 20 tablet 0     fentaNYL (DURAGESIC) 100 mcg/hr 72 hr patch Place 3 patches onto the skin every 72 hours remove old patch. 30 patch 0     hydromorphone HCl 500 MG/50ML SOLN Dilute 250mg in 250ml NS    0.9mg IV every 10 mins as needed    Dispense 1 bag (250mg = 25ml) 25 mL 0     hydromorphone HCl 500 MG/50ML SOLN Dilute 250mg in 250ml NS    0.9mg IV every 10 mins as needed    Dispense 1 bag (250mg = 25ml) 25 mL 0     hydromorphone HCl 500 MG/50ML SOLN Dilute 250mg in 250ml NS    0.9mg IV every 10 mins as needed    Dispense 1 bag (250mg = 25ml) 25 mL 0     hydromorphone HCl 500 MG/50ML SOLN Dilute 250mg in 250ml NS    0.9mg IV every 10 mins as needed    Dispense 1 bag (250mg = 25ml) 25 mL 0     LORazepam (ATIVAN) 0.5 MG tablet Take 1 tablet (0.5 mg) by mouth every 4 hours as needed (Anxiety, Nausea/Vomiting or Sleep) 30 tablet 5     naloxone (NARCAN) nasal spray Spray 1 spray (4 mg) into one nostril alternating nostrils as needed for opioid reversal every 2-3 minutes until assistance arrives (Patient not taking: Reported on 8/9/2018) 0.2 mL 0     ondansetron (ZOFRAN-ODT) 8 MG ODT tab Take 1 tablet (8 mg) by mouth every 8 hours as needed for nausea 60 tablet 5     order for DME Equipment being ordered: home suction machine with tubing for connection to venting G-tube  Treatment Diagnosis: colon adenocarcinoma 1 Device 0     pantoprazole (PROTONIX) 40 MG EC tablet Take 1 tablet (40 mg) by mouth every morning (before breakfast) 30 tablet 0     parenteral nutrition - PTA/DISCHARGE ORDER Inject into the vein daily [facility]       prochlorperazine (COMPAZINE) 10 MG tablet 10 mg every 6 hours as needed        sodium chloride 0.9% SOLN BOLUS Inject 1,000 mLs into the vein daily as needed For hydration. 1000 mL 0       Physical Examination:  General: The patient is a pleasant male in no acute distress.  There were no vitals taken for this  visit.  Wt Readings from Last 10 Encounters:   08/24/18 68.2 kg (150 lb 6.4 oz)   08/17/18 70.1 kg (154 lb 8.7 oz)   08/09/18 71.7 kg (158 lb)   08/09/18 71.8 kg (158 lb 6.4 oz)   08/06/18 71.5 kg (157 lb 10.1 oz)   07/24/18 65 kg (143 lb 6.4 oz)   07/20/18 69.9 kg (154 lb 3.2 oz)   07/10/18 73 kg (161 lb)   07/08/18 74.7 kg (164 lb 9.6 oz)   06/28/18 69.2 kg (152 lb 9.6 oz)     HEENT: EOMI, PERRL. Sclerae are anicteric. Oral mucosa is pink and sl dry with no lesions or thrush.   Lymph: Neck is supple with no lymphadenopathy in the cervical or supraclavicular areas.   Heart: Regular rate and rhythm.   Lungs: Clear to auscultation bilaterally.   Abdomen: Bowel sounds present, soft. Ostomy site is clean with liquid, green stool. Venting g tube site is c/d/i. Abdomen with some tenderness and firmness around periumbilical area and LLQ.   Extremities: No lower extremity edema noted bilaterally.   Neuro: Cranial nerves II through XII are grossly intact.  Skin: No rashes, petechiae, or bruising noted on exposed skin. Some tenting of skin on extremities    Laboratory Data:  Results for KARINA MINER (MRN [id number]) as of 8/30/2018 12:37   8/30/2018 08:42   Sodium 128 (L)   Potassium 4.0   Chloride 84 (L)   Carbon Dioxide 35 (H)   Urea Nitrogen 76 (H)   Creatinine 1.28 (H)   GFR Estimate 58 (L)   GFR Estimate If Black 71   Calcium 9.7   Anion Gap 9   Magnesium 2.7 (H)   Phosphorus 4.5   Albumin 3.2 (L)   Protein Total 8.8   Bilirubin Total 0.5   Alkaline Phosphatase 181 (H)   ALT 41   AST 21   Glucose 132 (H)   WBC 5.7   Hemoglobin 11.1 (L)   Hematocrit 33.7 (L)   Platelet Count 465 (H)   RBC Count 4.17 (L)   MCV 81   MCH 26.6   MCHC 32.9   RDW 17.3 (H)   Diff Method Automated Method   % Neutrophils 60.1   % Lymphocytes 24.2   % Monocytes 14.2   % Eosinophils 0.4   % Basophils 0.4   % Immature Granulocytes 0.7   Nucleated RBCs 0   Absolute Neutrophil 3.4   Absolute Lymphocytes 1.4   Absolute Monocytes 0.8   Absolute  Eosinophils 0.0   Absolute Basophils 0.0   Abs Immature Granulocytes 0.0   Absolute Nucleated RBC 0.0       Assessment and Plan:    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 Started irinotecan and Panitumumab on 8/17. Last week he was doing well, but today he is dehydrated. Likely increased GI losses from ostomy and venting G tube  --Stool tested. C diff negative. Enteric panel pending  --Will defer C2 1 week    Renal: Cr up to 1.28 from ~0.7 last week. Na 128 and Cl 84. K, Mag, Phos WNL. Suspect he is dehydrated. He does not document ostomy output, so difficult to obtain I/Os. He also has what sounds like 4-6L losses from venting G tube, although he is also drinking up to 100 oz/day and had not been clamping G tube when drinking. He has been doing 500cc IVF NS at home, and increased to 1L yesterday. No vomiting. Suspect dehydration is secondary to gtube and ostomy output. On TPN daily.  --Will give 2L NS IV today  --Will have him increase IVF at home to 1-2 L/day. Will also set up infusion for additional IVF as he is not always staying on top of dehydration at home. Will schedule 9/2 and 9/5  --Continue scheduled imodium. Recommend he pay closer attention to ostomy output and add lomotil if needed  --Continue po intake. Try to clamp G tube unless he is having more nausea or pain while clamped.     Abdominal pain due to peritoneal carcinomatosis as well as colonic obstruction.  Continue with fentanyl and Dilaudid PCA.  With this regimen his pain is under very decent control currently.  Continue follow-up with palliative care. He is scheduled next on 9/5     Nausea vomiting/nutrition.  His nausea and vomiting significantly improved after placement of venting G-tube.  Currently he is on full liquid diet and he is on TPN.  Continue the same.  Continue Zofran as needed for nausea and vomiting.     Anemia.  This is due to a combination of previous chemotherapy as well as from the cancer causing anemia  of chronic disease.  Previously he had evidence of iron deficiency anemia, but soluble transferrin receptor results suggests anemia is more of chronic disease. Hgb improved today to 11.1, but suspect this is concentration from dehydration.    Note: Cr improved to 1.01 after IVF. Na improved to 131    Jacque Dale PA-C  [facility] [address] [address] [phone]      Again, thank you for allowing me to participate in the care of your patient.      Sincerely,    ANNAMARIE Celestin

## 2018-08-31 NOTE — PROGRESS NOTES
This is a recent snapshot of the patient's [facility] medical record.  For current drug dose and complete information and questions, call [phone] or In Basket pool, [facility] home infusion ([id number])  CSN Number:  [id number]

## 2018-09-02 NOTE — MR AVS SNAPSHOT
After Visit Summary   9/2/2018    Sebas Lopez    MRN: [id number]           Patient Information     Date Of Birth          1965        Visit Information        Provider Department      9/2/2018 9:30 AM  18 ATC;  ONCOLOGY INFUSION [facility]        Today's Diagnoses     Peritoneal carcinomatosis (H)    -  1    Diarrhea, unspecified type        Dehydration          Care Instructions    Contact Numbers    [facility] Main Line: [phone]    Triage Nurse Line: [phone]      Please call the [facility] Nurse Triage line if you experience a temperature greater than or equal to 100.5, shaking chills, have uncontrolled nausea, vomiting and/or diarrhea, dizziness, shortness of breath, bleeding not relieved with pressure, or if you have any other questions or concerns.     If it is after hours, weekends, or holidays, please call the [phone] and a nurse will be able to triage your call.    If you are having any concerning symptoms or wish to speak to a provider before your next infusion visit, please call your care coordinator or triage them so we can adequately serve you.      If you need to refill your narcotic prescription or other medication, please call triage before your infusion appointment.        September 2018 Sunday Monday Tuesday Wednesday Thursday Friday Saturday                                 1       2     [email or web address] DRAW    9:00 AM   (15 min.)    [facility] LAB DRAW   [facility] Lab Draw     [email or web address] ONC INFUSION 120    9:30 AM   (120 min.)    ONCOLOGY INFUSION   [facility] 3     4     5     [facility] RETURN    1:45 PM   (45 min.)   Lore Calix MD   [facility] 6     7     P [facility] DRAW    6:45 AM   (15 min.)    [facility] LAB DRAW   [facility] Lab Draw     P RETURN    6:45 AM   (50 min.)   Adrianna Pearson APRN CNP   [facility] ONC INFUSION  240    8:00 AM   (240 min.)   UC ONCOLOGY INFUSION   [facility] 8       9     10     11     12     TELEMEDICINE    8:00 AM   (30 min.)   Sherry Coronel [facility] Medication Therapy Management 13     14     [facility] LAB DRAW   10:30 AM   (15 min.)    [facility] LAB DRAW   [facility] Lab Draw     [facility] RETURN   10:55 AM   (50 min.)   Linda Avles PA-C   formerly [facility] ONC INFUSION 240   12:30 PM   (240 min.)    ONCOLOGY INFUSION   [facility] 15       16     17     18     19     20     21     22       23     24     25     26     27     28     29       30 October 2018 Sunday Monday Tuesday Wednesday Thursday Friday Saturday        1     2     3     4     5     6       7     8     9     10     11     12     13       14     15     16     17     18     19     20       21     22     23     24     25     26     27       28     29     30     31                                Recent Results (from the past 24 hour(s))   Basic metabolic panel    Collection Time: 09/02/18  9:30 AM   Result Value Ref Range    Sodium 127 (L) 133 - 144 mmol/L    Potassium 3.7 3.4 - 5.3 mmol/L    Chloride 92 (L) 94 - 109 mmol/L    Carbon Dioxide 28 20 - 32 mmol/L    Anion Gap 8 3 - 14 mmol/L    Glucose 123 (H) 70 - 99 mg/dL    Urea Nitrogen 36 (H) 7 - 30 mg/dL    Creatinine 0.87 0.66 - 1.25 mg/dL    GFR Estimate >90 >60 mL/min/1.7m2    GFR Estimate If Black >90 >60 mL/min/1.7m2    Calcium 9.5 8.5 - 10.1 mg/dL                 Follow-ups after your visit        Your next 10 appointments already scheduled     Sep 05, 2018  2:00 PM CDT   (Arrive by 1:45 PM)   Return Visit with Lore Calix MD   [facility] ([facility])    [address] [place] [address] [phone]            Sep 07, 2018  6:45 AM CDT   [facility] Draw with [facility]  LAB DRAW   [facility]    [address]  [address]  [facility] [address]   [phone]            Sep 07, 2018  7:00 AM CDT   (Arrive by 6:45 AM)   Return Visit with ALEXA Maria CNP   [facility] ([facility])    [address]  [address]  [place] [address] [phone]            Sep 07, 2018  8:00 AM CDT   Infusion 240 with UC ONCOLOGY INFUSION, UC 15 ATC   [facility] ([facility])    [address]  [address]  [place] [facility] [address] [phone]            Sep 12, 2018  8:00 AM CDT   [facility]    [address]  [address]  [place] [facility] [id number] [phone]           Note: this is not an onsite visit; there is no need to come to the facility.            Sep 14, 2018 10:30 AM CDT   [facility] Lab Draw with [facility] LAB DRAW   [facility] Lab Draw ([facility])    [address]  [address]  [place] [address] [phone]            Sep 14, 2018 11:10 AM CDT   (Arrive by 10:55 AM)   Return Visit with Linda Alves PA-C   [facility] ([facility])    [address]  [address]  [place] [id number] [phone]            Sep 14, 2018 12:30 PM CDT   Infusion 240 with UC ONCOLOGY INFUSION, UC 20 ATC   [facility] ([facility])    [address]  [address]  [place] [id number] [phone]              Who to contact     If you have questions or need follow up information about today's clinic visit or your schedule please contact [facility] directly at [phone].  Normal or non-critical lab and imaging results will be communicated to you  by [employer], letter or phone within 4 business days after the clinic has received the results. If you do not hear from us within 7 days, please contact the clinic through [employer] or phone. If you have a critical or abnormal lab result, we will notify you by phone as soon as possible.  Submit refill requests through [employer] or call your pharmacy and they will forward the refill request to us. Please allow 3 business days for your refill to be completed.          Additional Information About Your Visit        [employer] Information     [employer] gives you secure access to your electronic health record. If you see a primary care provider, you can also send messages to your care team and make appointments. If you have questions, please call your primary care clinic.  If you do not have a primary care provider, please call [phone] and they will assist you.        Care EveryWhere ID     This is your Care EveryWhere ID. This could be used by other organizations to access your [place] medical records  [id number]        Your Vitals Were     Pulse Temperature Respirations Pulse Oximetry BMI (Body Mass Index)       95 98.5  F (36.9  C) (Oral) 18 98% 21.28 kg/m2        Blood Pressure from Last 3 Encounters:   09/02/18 127/88   08/30/18 116/84   08/24/18 119/80    Weight from Last 3 Encounters:   09/02/18 65.4 kg (144 lb 3.2 oz)   08/30/18 63.2 kg (139 lb 4.8 oz)   08/24/18 68.2 kg (150 lb 6.4 oz)              We Performed the Following     Basic metabolic panel        Primary Care Provider Office Phone # Fax #    Jaguar Becker [phone]       [facility] [address]  [facility] [address]        Equal Access to Services     [facility]: Hadii aad ku hadasho Soomaali, waaxda luqadaha, qaybta kaalmada adeegyada, bhargav sage . So [facility] [phone].    ATENCIÓN: Si habla español, tiene a cid disposición servicios gratuitos de asistencia lingüística. Llame al  [phone].    We comply with applicable federal civil rights laws and [place] laws. We do not discriminate on the basis of race, color, national origin, age, disability, sex, sexual orientation, or gender identity.            Thank you!     Thank you for choosing [facility]  for your care. Our goal is always to provide you with excellent care. Hearing back from our patients is one way we can continue to improve our services. Please take a few minutes to complete the written survey that you may receive in the mail after your visit with us. Thank you!             Your Updated Medication List - Protect others around you: Learn how to safely use, store and throw away your medicines at www.disposemymeds.org.          This list is accurate as of 9/2/18 12:37 PM.  Always use your most recent med list.                   Brand Name Dispense Instructions for use Diagnosis    dexamethasone 4 MG tablet    DECADRON    20 tablet    Take 1 tablet (4 mg) by mouth daily    Colon adenocarcinoma (H), Neoplasm related pain       fentaNYL 100 mcg/hr 72 hr patch    DURAGESIC    30 patch    Place 3 patches onto the skin every 72 hours remove old patch.    Colon adenocarcinoma (H), Neoplasm related pain, Peritoneal carcinomatosis (H)       * hydromorphone HCl 500 MG/50ML Soln     25 mL    Dilute 250mg in 250ml NS  0.9mg IV every 10 mins as needed  Dispense 1 bag (250mg = 25ml)    Neoplasm related pain       * hydromorphone HCl 500 MG/50ML Soln     25 mL    Dilute 250mg in 250ml NS  0.9mg IV every 10 mins as needed  Dispense 1 bag (250mg = 25ml)    Neoplasm related pain       * hydromorphone HCl 500 MG/50ML Soln     25 mL    Dilute 250mg in 250ml NS  0.9mg IV every 10 mins as needed  Dispense 1 bag (250mg = 25ml)    Neoplasm related pain       * hydromorphone HCl 500 MG/50ML Soln     25 mL    Dilute 250mg in 250ml NS  0.9mg IV every 10 mins as needed  Dispense 1 bag (250mg = 25ml)    Neoplasm related pain        loperamide 2 MG capsule    IMODIUM     Take 2 mg by mouth as needed for diarrhea        LORazepam 0.5 MG tablet    ATIVAN    30 tablet    Take 1 tablet (0.5 mg) by mouth every 4 hours as needed (Anxiety, Nausea/Vomiting or Sleep)    Peritoneal carcinomatosis (H), Cancer of sigmoid colon (H)       naloxone nasal spray    NARCAN    0.2 mL    Spray 1 spray (4 mg) into one nostril alternating nostrils as needed for opioid reversal every 2-3 minutes until assistance arrives    Cancer of sigmoid colon (H), Long term current use of opiate analgesic       ondansetron 8 MG ODT tab    ZOFRAN-ODT    60 tablet    Take 1 tablet (8 mg) by mouth every 8 hours as needed for nausea    Nausea       order for DME     1 Device    Equipment being ordered: home suction machine with tubing for connection to venting G-tube Treatment Diagnosis: colon adenocarcinoma    Colon adenocarcinoma (H), Small bowel obstruction       pantoprazole 40 MG EC tablet    PROTONIX    30 tablet    Take 1 tablet (40 mg) by mouth every morning (before breakfast)    History of recent steroid use       parenteral nutrition - PTA/DISCHARGE ORDER      Inject into the vein daily [facility]        prochlorperazine 10 MG tablet    COMPAZINE     10 mg every 6 hours as needed        sodium chloride 0.9% Soln BOLUS     1000 mL    Inject 1,000 mLs into the vein daily as needed For hydration.        * Notice:  This list has 4 medication(s) that are the same as other medications prescribed for you. Read the directions carefully, and ask your doctor or other care provider to review them with you.

## 2018-09-02 NOTE — NURSING NOTE
Chief Complaint   Patient presents with     Blood Draw     Labs drawn via PICC by RN. Line flushed and hep locked. VS Taken.     Jaylin Hoang RN

## 2018-09-02 NOTE — PROGRESS NOTES
Infusion Nursing Note:  Sebas Lopez presents today for 2000 ml IVF.    Patient seen by provider today: No    Note: Patient reports feeling better since Thursday.  He continues to report diarrhea and continues to use Imodium daily.  Patient rates pain at 1-2 in his abdomen but has a Dilaudid PCA that he uses prn.  Patient thinks he is dehydrated as he feels cramping in his legs and hands occasionally at night.  Patient to receive two L NS today but does not require any Potassium supplementation today.      Intravenous Access:  PICC.      Treatment Conditions:  Lab Results   Component Value Date    HGB 11.1 08/30/2018     Lab Results   Component Value Date    WBC 5.7 08/30/2018      Lab Results   Component Value Date    ANEU 3.4 08/30/2018     Lab Results   Component Value Date     08/30/2018      Lab Results   Component Value Date     09/02/2018                   Lab Results   Component Value Date    POTASSIUM 3.7 09/02/2018           Lab Results   Component Value Date    MAG 2.7 08/30/2018            Lab Results   Component Value Date    CR 0.87 09/02/2018                   Lab Results   Component Value Date    ANNAMARIE 9.5 09/02/2018                Lab Results   Component Value Date    BILITOTAL 0.5 08/30/2018           Lab Results   Component Value Date    ALBUMIN 3.2 08/30/2018                    Lab Results   Component Value Date    ALT 41 08/30/2018           Lab Results   Component Value Date    AST 21 08/30/2018       Results reviewed, labs MET treatment parameters, ok to proceed with treatment.      Post Infusion Assessment:  Patient tolerated infusion without incident.  Blood return noted pre and post infusion.  Site patent and intact, free from redness, edema or discomfort.  No evidence of extravasations.      Discharge Plan:   Patient declined prescription refills.  Discharge instructions reviewed with: Patient.  Patient and/or family verbalized understanding of discharge instructions and  all questions answered.  Copy of AVS reviewed with patient and/or family.  Patient will return 9/7/18 for labs, to see Adrianna Pearson and for C2D1 Irinotecan/ Vectibix for next appointment.  Patient discharged in stable condition accompanied by: self.  Departure Mode: Ambulatory.    Tarah Saldana RN

## 2018-09-02 NOTE — PATIENT INSTRUCTIONS
Contact Numbers    [facility] Main Line: [phone]    Triage Nurse Line: [phone]      Please call the [facility] Nurse Triage line if you experience a temperature greater than or equal to 100.5, shaking chills, have uncontrolled nausea, vomiting and/or diarrhea, dizziness, shortness of breath, bleeding not relieved with pressure, or if you have any other questions or concerns.     If it is after hours, weekends, or holidays, please call the [phone] and a nurse will be able to triage your call.    If you are having any concerning symptoms or wish to speak to a provider before your next infusion visit, please call your care coordinator or triage them so we can adequately serve you.      If you need to refill your narcotic prescription or other medication, please call triage before your infusion appointment.        September 2018 Sunday Monday Tuesday Wednesday Thursday Friday Saturday                                 1       2     [facility] DRAW    9:00 AM   (15 min.)    [facility] LAB DRAW   [facility] Lab Draw     [facility] ONC INFUSION 120    9:30 AM   (120 min.)    ONCOLOGY INFUSION   [facility] 3     4     5     [facility] RETURN    1:45 PM   (45 min.)   Lore Calix MD   [facility] 6     7     [facility] DRAW    6:45 AM   (15 min.)    [facility] LAB DRAW   [facility] Lab Draw     [facility] RETURN    6:45 AM   (50 min.)   Adrianna Pearson, [facility]XA CNP   [facility]    8:00 AM   (240 min.)   UC ONCOLOGY INFUSION   [facility] 8       9     10     11     12     TELEMEDICINE    8:00 AM   (30 min.)   Sherry Coronel [facility] Medication Therapy Management 13     14     [facility] DRAW   10:30 AM   (15 min.)    [facility] LAB DRAW   [facility] Lab Draw     [facility] RETURN   10:55 AM   (50 min.)   Linda Alves PA-C   [facility] ONC  INFUSION 240   12:30 PM   (240 min.)    ONCOLOGY INFUSION   [facility] 15       16     17     18     19     20     21     22       23     24     25     26     27     28     29       30                                              October 2018 Sunday Monday Tuesday Wednesday Thursday Friday Saturday        1     2     3     4     5     6       7     8     9     10     11     12     13       14     15     16     17     18     19     20       21     22     23     24     25     26     27       28     29     30     31                                Recent Results (from the past 24 hour(s))   Basic metabolic panel    Collection Time: 09/02/18  9:30 AM   Result Value Ref Range    Sodium 127 (L) 133 - 144 mmol/L    Potassium 3.7 3.4 - 5.3 mmol/L    Chloride 92 (L) 94 - 109 mmol/L    Carbon Dioxide 28 20 - 32 mmol/L    Anion Gap 8 3 - 14 mmol/L    Glucose 123 (H) 70 - 99 mg/dL    Urea Nitrogen 36 (H) 7 - 30 mg/dL    Creatinine 0.87 0.66 - 1.25 mg/dL    GFR Estimate >90 >60 mL/min/1.7m2    GFR Estimate If Black >90 >60 mL/min/1.7m2    Calcium 9.5 8.5 - 10.1 mg/dL

## 2018-09-04 NOTE — TELEPHONE ENCOUNTER
----- Message from Heydi Stone sent at 9/4/2018 11:07 AM CDT -----  Regarding: IVF tomorrow  Hey,  I just called this pt to let him know about his IVF appt in BMT tomorrow (per Jacque's check out orders from 8/30).  He said that he got labs this morning with home health and wanted someone to look at his counts to see if he needs IVF tomorrow.  Can you take a look and let him know?   Thanks,  Heydi

## 2018-09-04 NOTE — TELEPHONE ENCOUNTER
TC to pt after reviewing lab results with Krysten. Let pt know that per Krysten, he will need IVF's tomorrow. Pt stated understanding and is planning on infusion.

## 2018-09-05 NOTE — NURSING NOTE
Chief Complaint   Patient presents with     Blood Draw     labs drawn from picc line by rn.  vs taken.     PICC line accessed by RN, labs drawn.   Vital signs taken.  Pt checked in to next appointment.  Elo Miles RN

## 2018-09-05 NOTE — PROGRESS NOTES
This is a recent snapshot of the patient's [facility] medical record.  For current drug dose and complete information and questions, call [phone] or In Basket pool, fv home infusion ([id number])  CSN Number:  [id number]

## 2018-09-05 NOTE — MR AVS SNAPSHOT
After Visit Summary   9/5/2018    Sebas Lopez    MRN: [id number]           Patient Information     Date Of Birth          1965        Visit Information        Provider Department      9/5/2018 2:00 PM Lore Calix MD [facility]        Today's Diagnoses     Colon adenocarcinoma (H)    -  1    History of recent steroid use        Neoplasm related pain           Follow-ups after your visit        Your next 10 appointments already scheduled     Sep 21, 2018 10:45 AM CDT   [facility] Draw with [facility] DRAW   [facility] ([facility])    [address]  [address]  [facility] [address]   [phone]            Sep 21, 2018 11:10 AM CDT   (Arrive by 10:55 AM)   Return Visit with Linda Alves PA-C   [facility] ([facility])    [address] [place] [address]  [address]  [facility] [address]   [phone]            Sep 21, 2018 12:30 PM CDT   Infusion 240 with UC ONCOLOGY INFUSION, UC 20 ATC   [facility] ([facility])    [address]  [address]  [facility] [address]   [phone]            Oct 04, 2018 10:00 AM CDT   [facility] Lab Draw with [facility] LAB DRAW   [facility] Lab Draw ([facility])    [address] [place] [address]  [address]  [facility] [address]   [phone]            Oct 04, 2018 10:40 AM CDT   CT CHEST/ABDOMEN/PELVIS W CONTRAST with UCCT2   [facility] Imaging [place] [facility] ([facility])    [address] [place] [address]  1st Floor  [facility] [address]   [phone]           How do I prepare for my exam? (Food and drink instructions) To prepare: Do not eat or drink for 2 hours before your exam. If you need to take medicine, you may take it with small sips of water. (We may ask you to take liquid medicine as well.)  How do I prepare for my exam?  (Other instructions) Please arrive 30 minutes early for your CT.  Once in the department you might be asked to drink water 15-20 minutes prior to your exam.  If indicated you may be asked to drink an oral contrast in advance of your CT.  If this is the case, the imaging team will let you know or be in contact with you prior to your appointment  Patients over 70 or patients with diabetes or kidney problems: If you haven t had a blood test (creatinine test) within the last 30 days, the Cardiologist/Radiologist may require you to get this test prior to your exam.  If you have diabetes:  Continue to take your metformin medication on the day of your exam  What should I wear: Please wear loose clothing, such as a sweat suit or jogging clothes. Avoid snaps, zippers and other metal. We may ask you to undress and put on a hospital gown.  How long does the exam take: Most scans take less than 20 minutes.  What should I bring: Please bring any scans or X-rays taken at other hospitals, if similar tests were done. Also bring a list of your medicines, including vitamins, minerals and over-the-counter drugs. It is safest to leave personal items at home.  Do I need a : No  is needed.  What do I need to tell my doctor? Be sure to tell your doctor: * If you have any allergies. * If there s any chance you are pregnant. * If you are breastfeeding.  What should I do after the exam: No restrictions, You may resume normal activities.  What is this test: A CT (computed tomography) scan is a series of pictures that allows us to look inside your body. The scanner creates images of the body in cross sections, much like slices of bread. This helps us see any problems more clearly. You may receive contrast (X-ray dye) before or during your scan. You will be asked to drink the contrast.  Who should I call with questions: If you have any questions, please call the Imaging Department where you will have your exam. Directions, parking  instructions, and other information is available on our website, [email or web address].            Oct 04, 2018  1:00 PM CDT   (Arrive by 12:45 PM)   Return Visit with Albert Long MD   [facility] ([facility])    [address] [place] [address]  [address]  [facility] [address] [phone]            Oct 04, 2018  1:30 PM CDT   Infusion 240 with UC ONCOLOGY INFUSION, UC 32 ATC   [facility] ([facility])    [address] [place] [address]  [address]  [facility] [id number] [phone]            Oct 04, 2018  2:00 PM CDT   (Arrive by 1:45 PM)   Return Visit with Lydia Beckham MD   [facility] ([facility])    [address] [place] [address]  [address]  [facility] [address] [phone]              Who to contact     If you have questions or need follow up information about today's clinic visit or your schedule please contact [facility] directly at [phone].  Normal or non-critical lab and imaging results will be communicated to you by MyChart, letter or phone within 4 business days after the clinic has received the results. If you do not hear from us within 7 days, please contact the clinic through [email or web address] or phone. If you have a critical or abnormal lab result, we will notify you by phone as soon as possible.  Submit refill requests through [employer] or call your pharmacy and they will forward the refill request to us. Please allow 3 business days for your refill to be completed.          Additional Information About Your Visit        MyChart Information     [employer] gives you secure access to your electronic health record. If you see a primary care provider, you can also send messages to your care team and make appointments. If you have questions, please call your primary care clinic.  If you do not have a primary care provider, please call [phone] and they will  assist you.        Care EveryWhere ID     This is your Care EveryWhere ID. This could be used by other organizations to access your [facility] medical records  [id number]        Your Vitals Were     Pulse Temperature Respirations Pulse Oximetry BMI (Body Mass Index)       94 98.6  F (37  C) (Oral) 16 99% 21.15 kg/m2        Blood Pressure from Last 3 Encounters:   09/07/18 118/75   09/05/18 119/67   09/02/18 127/88    Weight from Last 3 Encounters:   09/07/18 64.1 kg (141 lb 4.8 oz)   09/05/18 65 kg (143 lb 4.8 oz)   09/02/18 65.4 kg (144 lb 3.2 oz)              Today, you had the following     No orders found for display         Where to get your medicines      These medications were sent to [address] [address]     Phone:  [phone]     pantoprazole 40 MG EC tablet          Primary Care Provider Office Phone # Fax #    Jaguar Becker [phone]       [facility] [address]  [facility] [address]        Equal Access to Services     [facility]: Hadii aad ku hadasho Soomaali, waaxda luqadaha, qaybta kaalmada adeegyada, bhargav willis. So [facility] [phone].    ATENCIÓN: Si habla español, tiene a cid disposición servicios gratuitos de asistencia lingüística. Corinaame al [phone].    We comply with applicable federal civil rights laws and [place] laws. We do not discriminate on the basis of race, color, national origin, age, disability, sex, sexual orientation, or gender identity.            Thank you!     Thank you for choosing [facility]  for your care. Our goal is always to provide you with excellent care. Hearing back from our patients is one way we can continue to improve our services. Please take a few minutes to complete the written survey that you may receive in the mail after your visit with us. Thank you!             Your Updated  Medication List - Protect others around you: Learn how to safely use, store and throw away your medicines at www.disposemymeds.org.          This list is accurate as of 9/5/18 11:59 PM.  Always use your most recent med list.                   Brand Name Dispense Instructions for use Diagnosis    dexamethasone 4 MG tablet    DECADRON    20 tablet    Take 1 tablet (4 mg) by mouth daily    Colon adenocarcinoma (H), Neoplasm related pain       loperamide 2 MG capsule    IMODIUM     Take 2 mg by mouth as needed for diarrhea        LORazepam 0.5 MG tablet    ATIVAN    30 tablet    Take 1 tablet (0.5 mg) by mouth every 4 hours as needed (Anxiety, Nausea/Vomiting or Sleep)    Peritoneal carcinomatosis (H), Cancer of sigmoid colon (H)       naloxone nasal spray    NARCAN    0.2 mL    Spray 1 spray (4 mg) into one nostril alternating nostrils as needed for opioid reversal every 2-3 minutes until assistance arrives    Cancer of sigmoid colon (H), Long term current use of opiate analgesic       ondansetron 8 MG ODT tab    ZOFRAN-ODT    60 tablet    Take 1 tablet (8 mg) by mouth every 8 hours as needed for nausea    Nausea       order for DME     1 Device    Equipment being ordered: home suction machine with tubing for connection to venting G-tube Treatment Diagnosis: colon adenocarcinoma    Colon adenocarcinoma (H), Small bowel obstruction       pantoprazole 40 MG EC tablet    PROTONIX    30 tablet    Take 1 tablet (40 mg) by mouth every morning (before breakfast)    History of recent steroid use       parenteral nutrition - PTA/DISCHARGE ORDER      Inject into the vein daily [facility]        prochlorperazine 10 MG tablet    COMPAZINE     10 mg every 6 hours as needed        sodium chloride 0.9% Soln BOLUS     1000 mL    Inject 1,000 mLs into the vein daily as needed For hydration.

## 2018-09-05 NOTE — NURSING NOTE
"Oncology Rooming Note    September 5, 2018 2:37 PM   Sebas Lopez is a 53 year old male who presents for:    Chief Complaint   Patient presents with     Blood Draw     labs drawn from picc line by rn.  vs taken.     Oncology Clinic Visit     return - Palliative      Initial Vitals: /67 (BP Location: Left arm, Patient Position: Sitting, Cuff Size: Adult Regular)  Pulse 94  Temp 98.6  F (37  C) (Oral)  Resp 16  Wt 65 kg (143 lb 4.8 oz)  SpO2 99%  BMI 21.15 kg/m2 Estimated body mass index is 21.15 kg/(m^2) as calculated from the following:    Height as of 8/24/18: 1.753 m (5' 9.02\").    Weight as of this encounter: 65 kg (143 lb 4.8 oz). Body surface area is 1.78 meters squared.  Mild Pain (2) Comment: Data Unavailable   No LMP for male patient.  Allergies reviewed: Yes  Medications reviewed: Yes    Medications: MEDICATION REFILLS NEEDED TODAY. Provider was notified.  Refill needed for Protonix    Pharmacy name entered into [facility]: [facility] [place] - [address] [place] [address]    Clinical concerns: Palliative      6 minutes for nursing intake (face to face time)     Lynn Vega CMA            "

## 2018-09-05 NOTE — PROGRESS NOTES
Palliative Care Outpatient Clinic Progress Note    Patient Name:  Sebas Lopez  Primary Provider:  Jaguar Becker    Chief Complaint: diarrhea    Interim History:  Sebas Lopez 53 year old male returns to be seen by palliative care today.      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    Last seen in palliative care clinic 8/9/2018, since then started Irinotecan and Panitumumab on 8/17/18. Next round anticpated  9/7, was delayed due to concern for dehydration. Seen in oncology clinic 8/30. At that time he was noted to have lost significant weight with concern for dehydration. His IVFs were increased to 1-2L/day, previously doing 500cc/day. He ahs been clamping his G tube more frequently when he drinks fluids. He has been emptying venting bag 2x/day (about 2L total, which is down from earlier this month). He has been drinking more fluids (~1.5 liters/day). He does not monito his ostomy output regularly, and estimates he is emptying his bag about 3 times/day. Output is ususally watery. Has was instructed to imodium regularly but has been taking this intermittently. No clear reason why he's missing doses, partly endorses forgetting and partly doesn't think he needs it. Despite increasing the duration and frequency of clamping his G-tube, he denies increase in nausea, vomiting or abdominal pain. He is taking zofran about 2x/day, less for nausea and more because he was told it could help his abdomina pain/diarrhea.    He continues on TPN at night for 12 hours, 8pm-8am. Has been clamping more overnight due to concern for dehydration. Tolerating well. No increase in pain (1-2) no nausea or vomiting.     Coping:  Reports coping well and feeling better overall. No real change    Social History:  Pertinent changes to social history/social situation since last visit: None  Key  support resources: Bessie Daugherty (significant other), rinku garcia (mother)  Advance Directive Status: None on file  Social History   Substance Use Topics     Smoking status: Never Smoker     Smokeless tobacco: Never Used     Alcohol use 3.0 oz/week     5 Cans of beer per week      Comment: none for last 4 months       Allergies   Allergen Reactions     Liquid Adhesive Rash     Dermabond, rash with pustules, occurred with abdominal surgery and port removal      Current Outpatient Prescriptions   Medication Sig Dispense Refill     dexamethasone (DECADRON) 4 MG tablet Take 1 tablet (4 mg) by mouth daily 20 tablet 0     fentaNYL (DURAGESIC) 100 mcg/hr 72 hr patch Place 3 patches onto the skin every 72 hours remove old patch. 30 patch 0     hydromorphone HCl 500 MG/50ML SOLN Dilute 250mg in 250ml NS    0.9mg IV every 10 mins as needed    Dispense 1 bag (250mg = 25ml) 25 mL 0     hydromorphone HCl 500 MG/50ML SOLN Dilute 250mg in 250ml NS    0.9mg IV every 10 mins as needed    Dispense 1 bag (250mg = 25ml) (Patient not taking: Reported on 8/30/2018) 25 mL 0     hydromorphone HCl 500 MG/50ML SOLN Dilute 250mg in 250ml NS    0.9mg IV every 10 mins as needed    Dispense 1 bag (250mg = 25ml) (Patient not taking: Reported on 8/30/2018) 25 mL 0     hydromorphone HCl 500 MG/50ML SOLN Dilute 250mg in 250ml NS    0.9mg IV every 10 mins as needed    Dispense 1 bag (250mg = 25ml) (Patient not taking: Reported on 8/30/2018) 25 mL 0     loperamide (IMODIUM) 2 MG capsule Take 2 mg by mouth as needed for diarrhea       LORazepam (ATIVAN) 0.5 MG tablet Take 1 tablet (0.5 mg) by mouth every 4 hours as needed (Anxiety, Nausea/Vomiting or Sleep) 30 tablet 5     naloxone (NARCAN) nasal spray Spray 1 spray (4 mg) into one nostril alternating nostrils as needed for opioid reversal every 2-3 minutes until assistance arrives (Patient not taking: Reported on 8/9/2018) 0.2 mL 0     ondansetron (ZOFRAN-ODT) 8 MG ODT tab Take 1 tablet (8 mg) by  mouth every 8 hours as needed for nausea 60 tablet 5     order for DME Equipment being ordered: home suction machine with tubing for connection to venting G-tube  Treatment Diagnosis: colon adenocarcinoma 1 Device 0     pantoprazole (PROTONIX) 40 MG EC tablet Take 1 tablet (40 mg) by mouth every morning (before breakfast) 30 tablet 0     parenteral nutrition - PTA/DISCHARGE ORDER Inject into the vein daily [facility]HI       prochlorperazine (COMPAZINE) 10 MG tablet 10 mg every 6 hours as needed        sodium chloride 0.9% SOLN BOLUS Inject 1,000 mLs into the vein daily as needed For hydration. 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     Ulcerative colitis (H)      Past Surgical History:   Procedure Laterality Date     COLONOSCOPY  2017     COLONOSCOPY N/A 11/30/2017    Procedure: COLONOSCOPY;  Colonoscopy;  Surgeon: Rob Dudley MD;  Location: [facility]     COLONOSCOPY N/A 2/6/2018    Procedure: COMBINED COLONOSCOPY, STENT PLACEMENT;  COMBINED COLONOSCOPY with Colonic Stent Placement;  Surgeon: Guru Lionel Mar MD;  Location: [facility]     COLONOSCOPY N/A 3/19/2018    Procedure: COMBINED COLONOSCOPY, STENT PLACEMENT;  flexible sigmoidoscopy with stent placement and dilation;  Surgeon: Alexis Rios MD;  Location: [facility]     COLONOSCOPY N/A 7/5/2018    Procedure: COMBINED COLONOSCOPY, STENT PLACEMENT;  flexible sigmoidoscopy with colonic stent placement ;  Surgeon: Alexis Rios MD;  Location: [facility] OR     GI SURGERY  07/10/2017    Extensive lysis of adhesions, small bowel resection with end ileostomy.      HERNIA REPAIR       INSERT PORT VASCULAR ACCESS Right 8/10/2017    Procedure: INSERT  PORT VASCULAR ACCESS;  Single Lumen Right Chest Power Port;  Surgeon: Malena Andrew PA-C;  [facility]     LAPAROSCOPY DIAGNOSTIC (GENERAL) N/A 2017    Procedure: LAPAROSCOPY DIAGNOSTIC (GENERAL);  Diagnostic Laparoscopy, Exploratory Laparotomy Anesthesia Block ;  Surgeon: Rob Dudley MD;  Location: [facility]     LAPAROTOMY EXPLORATORY N/A 2017    Procedure: LAPAROTOMY EXPLORATORY;;  Surgeon: Rob Dudley MD;  Location: [facility]     ORTHOPEDIC SURGERY Left     HIP ARTHROPLASTY     PICC INSERTION Right 2018    5Fr DL BioFlo PICC, 44cm (3cm external) in the R basilic vein w/ tip in the SVC RA junction       Review of Systems:   ROS: 10 point ROS neg other than the symptoms noted above in the HPI and pertinents here:  Palliative Symptom Review (0=no symptom/no concern, 1=mild, 2=moderate, 3=severe):      Pain: 1-2      Fatigue: 1      Nausea: 1      Constipation: 0      Diarrhea: 2      Depressive Symptoms: 1      Anxiety: 0      Drowsiness: 0      Poor Appetite: 2      Shortness of Breath: 0      Insomnia: 1      Overall (0 good/no concerns, 3 very poor):  1    General: patient appears comfortable and in no distress  HEENT: normocephalic, atraumatic, sclera non-icteric, no nasal discharge, moist mucous membranes  Chest/Respiratory: Symmetric chest rise, no increased work of breathing, lungs clear to auscultation bilaterally  Cardiac: regular rate and rhythm  Abdomen: soft, non-tender, non-distended, ostomy with watery output in bad  Extremities: warm and well perfused, moves all extremities   Skin: warm, dry and intact, no rashes   Neuro: awake, alert and oriented to person, place, time and situation  Psych: engaged, good eye contact, normal affect, normal thought content    Key Data Reviewed:  LABS: BMP (18) Na 127, Cl 92, , BUN 36, GFR >90. BMP pending for today. C diff negative. 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    1. 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 Continue fentanyl patch and PCA. No acute change. He is alert and is not having side effects from his opiates and appears to be using them safety. He had adequate refills and will see us again in a month.    2. Dehydration - likely secondary to high output from ostomy. Recommended trying to monitor output better as well as scheduling imodium (4mg BID) and zofran BID. Otherwise continue with current PO hydration and intermittent IV fluids. Cr is improved overall.     3. Nause and vomiting - improved with placement of G-tube. No acute changes to regimen    Lore Calix MD  Palliative Care Fellow, PGY-4  Pager [phone]      Attending Note:   Patient seen and evaluated with Dr. Calix and I agree with/confirm her findings/recs in this note.     Thank you for involving us in the patient's care.   Lydia Beckham MD / Palliative Medicine / Pager [phone] / [facility] Inpatient Team Consult Pager [phone] (answered 8am-430pm M-F) - ok to text page via [employer] / After-Hours Answering Service [phone] / Palliative Clinic in the [facility] at the [facility] - [phone] (scheduling); [phone] (triage).

## 2018-09-05 NOTE — MR AVS SNAPSHOT
After Visit Summary   9/5/2018    Sebas Lopez    MRN: [id number]           Patient Information     Date Of Birth          1965        Visit Information        Provider Department      9/5/2018 3:00 PM UC 6 ATC; UC BMT INFUSION [facility] Blood and Marrow Transplant        Today's Diagnoses     Dehydration    -  1    Diarrhea, unspecified type              [facility] ([facility])  [address] [place] [address]  Phone: [phone]  Clinic Hours:   Monday-Thursday:7am to 7pm   Friday: 7am to 5pm   Weekends and holidays:    8am to noon (in general)  If your fever is 100.5  or greater,   call the clinic.  After hours call the   hospital at [phone] or   [phone]. Ask for the BMT   fellow on-call            Follow-ups after your visit        Your next 10 appointments already scheduled     Sep 07, 2018  6:45 AM CDT   [facility] Draw with  [facility] DRAW   [facility] Draw ([facility])    [address] [place] [address]  [address] [place] [id number] [phone]            Sep 07, 2018  7:00 AM CDT   (Arrive by 6:45 AM)   Return Visit with ALEXA Maria CNP   [facility] ([facility])    [address] [place] [id number] [phone]            Sep 07, 2018  8:00 AM CDT   Infusion 240 with UC ONCOLOGY INFUSION, UC 15 ATC   [facility] ([facility])    [address] [place] [address]  [address] [place] [id number] [phone]            Sep 12, 2018  8:00 AM CDT   TELEMEDICINE with Sherry Coronel RPH   [facility] Medication Therapy Management ([facility])    [address] [place] [id number] [phone]           Note: this is not an onsite visit; there is no need to come to the facility.            Sep 14, 2018 10:30 AM CDT   [facility] Lab Draw with  [facility] LAB  DRAW   [facility] Lab Draw ([facility])    [address]  [address]  [facility] [id number] [phone]            Sep 14, 2018 11:10 AM CDT   (Arrive by 10:55 AM)   Return Visit with Linda Alves PA-C   [facility] ([facility])    [address]  [address]  [facility] [id number] [phone]            Sep 14, 2018 12:30 PM CDT   Infusion 240 with UC ONCOLOGY INFUSION, UC 20 ATC   [facility] ([facility])    [address]  [address]  [facility] [address] [phone]              Who to contact     If you have questions or need follow up information about today's clinic visit or your schedule please contact [facility]LOOD [facility]ARROW TRANSPLANT directly at [phone].  Normal or non-critical lab and imaging results will be communicated to you by [facility], letter or phone within 4 business days after the clinic has received the results. If you do not hear from us within 7 days, please contact the clinic through [employer] or phone. If you have a critical or abnormal lab result, we will notify you by phone as soon as possible.  Submit refill requests through [employer] or call your pharmacy and they will forward the refill request to us. Please allow 3 business days for your refill to be completed.          Additional Information About Your Visit        [facility] Information     [employer] gives you secure access to your electronic health record. If you see a primary care provider, you can also send messages to your care team and make appointments. If you have questions, please call your primary care clinic.  If you do not have a primary care provider, please call [phone] and they will assist you.        Care EveryWhere ID     This is your Care EveryWhere ID. This could be used by other organizations to access your [facility] medical records  [id number]          Blood Pressure from Last 3 Encounters:   09/05/18 119/67   09/02/18 127/88   08/30/18 116/84    Weight from Last 3 Encounters:   09/05/18 65 kg (143 lb 4.8 oz)   09/02/18 65.4 kg (144 lb 3.2 oz)   08/30/18 63.2 kg (139 lb 4.8 oz)              We Performed the Following     Comprehensive metabolic panel     Magnesium          Where to get your medicines      These medications were sent to [address] [address]     Phone:  [phone]     pantoprazole 40 MG EC tablet          Recent Review Flowsheet Data     BMT Recent Results Latest Ref Rng & Units 8/6/2018 8/9/2018 8/24/2018 8/30/2018 8/30/2018 9/2/2018 9/5/2018    WBC 4.0 - 11.0 10e9/L 9.3 9.1 4.6 5.7 - - -    Hemoglobin 13.3 - 17.7 g/dL 8.0(L) 8.8(L) 8.8(L) 11.1(L) - - -    Platelet Count 150 - 450 10e9/L 395 453(H) 320 465(H) - - -    Platelets 10:9/L - - - - - - -    Neutrophils (Absolute) 1.6 - 8.3 10e9/L 5.6 5.3 2.8 3.4 - - -    INR 0.86 - 1.14 1.10 - - - - - -    Sodium 133 - 144 mmol/L 136 136 134 128(L) 131(L) 127(L) 129(L)    Potassium 3.4 - 5.3 mmol/L 4.4 4.2 4.2 4.0 - 3.7 3.8    Chloride 94 - 109 mmol/L 104 103 98 84(L) - 92(L) 93(L)    Glucose 70 - 99 mg/dL 109(H) 87 110(H) 132(H) - 123(H) 124(H)    Urea Nitrogen 7 - 30 mg/dL 26 25 30 76(H) - 36(H) 32(H)    Creatinine 0.66 - 1.25 mg/dL 0.62(L) 0.72 0.73 1.28(H) 1.05 0.87 0.78    Calcium (Total) 8.5 - 10.1 mg/dL 8.2(L) 8.6 8.5 9.7 - 9.5 9.0    Protein (Total) 6.8 - 8.8 g/dL 6.7(L) 7.4 7.5 8.8 - - 7.7    Albumin 3.4 - 5.0 g/dL 2.0(L) 2.5(L) 2.7(L) 3.2(L) - - 2.6(L)    Bilirubin (Direct) mg/dL - - - - - - -    Alkaline Phosphatase 40 - 150 U/L 137 151(H) 145 181(H) - - 167(H)    AST 0 - 45 U/L 15 29 34 21 - - 32    ALT 0 - 70 U/L 15 33 65 41 - - 66    MCV 78 - 100 fl 84 87 83 81 - - -               Primary Care Provider Office Phone # Fax #    Jaguar Becker [phone]       [facility] [address] [facility] [address]        Equal Access to Services     SARAH DUNN : Hadii karrie reddy patricevens Soyolandaali, waaxda luqadaha, qaybta kamaddyrita saucedo, bhargav blancozullyvijay willis. So [facility] [phone].    ATENCIÓN: Si habla español, tiene a cid disposición servicios gratuitos de asistencia lingüística. Llame al [phone].    We comply with applicable federal civil rights laws and [place] laws. We do not discriminate on the basis of race, color, national origin, age, disability, sex, sexual orientation, or gender identity.            Thank you!     Thank you for choosing [facility] BLOOD [facility] MARROW TRANSPLANT  for your care. Our goal is always to provide you with excellent care. Hearing back from our patients is one way we can continue to improve our services. Please take a few minutes to complete the written survey that you may receive in the mail after your visit with us. Thank you!             Your Updated Medication List - Protect others around you: Learn how to safely use, store and throw away your medicines at www.disposemymeds.org.          This list is accurate as of 9/5/18  4:40 PM.  Always use your most recent med list.                   Brand Name Dispense Instructions for use Diagnosis    dexamethasone 4 MG tablet    DECADRON    20 tablet    Take 1 tablet (4 mg) by mouth daily    Colon adenocarcinoma (H), Neoplasm related pain       fentaNYL 100 mcg/hr 72 hr patch    DURAGESIC    30 patch    Place 3 patches onto the skin every 72 hours remove old patch.    Colon adenocarcinoma (H), Neoplasm related pain, Peritoneal carcinomatosis (H)       * hydromorphone HCl 500 MG/50ML Soln     25 mL    Dilute 250mg in 250ml NS  0.9mg IV every 10 mins as needed  Dispense 1 bag (250mg = 25ml)    Neoplasm related pain       * hydromorphone HCl 500 MG/50ML Soln     25 mL    Dilute 250mg in 250ml NS  0.9mg IV every 10 mins as needed  Dispense 1 bag (250mg = 25ml)    Neoplasm related pain        * hydromorphone HCl 500 MG/50ML Soln     25 mL    Dilute 250mg in 250ml NS  0.9mg IV every 10 mins as needed  Dispense 1 bag (250mg = 25ml)    Neoplasm related pain       * hydromorphone HCl 500 MG/50ML Soln     25 mL    Dilute 250mg in 250ml NS  0.9mg IV every 10 mins as needed  Dispense 1 bag (250mg = 25ml)    Neoplasm related pain       loperamide 2 MG capsule    IMODIUM     Take 2 mg by mouth as needed for diarrhea        LORazepam 0.5 MG tablet    ATIVAN    30 tablet    Take 1 tablet (0.5 mg) by mouth every 4 hours as needed (Anxiety, Nausea/Vomiting or Sleep)    Peritoneal carcinomatosis (H), Cancer of sigmoid colon (H)       naloxone nasal spray    NARCAN    0.2 mL    Spray 1 spray (4 mg) into one nostril alternating nostrils as needed for opioid reversal every 2-3 minutes until assistance arrives    Cancer of sigmoid colon (H), Long term current use of opiate analgesic       ondansetron 8 MG ODT tab    ZOFRAN-ODT    60 tablet    Take 1 tablet (8 mg) by mouth every 8 hours as needed for nausea    Nausea       order for DME     1 Device    Equipment being ordered: home suction machine with tubing for connection to venting G-tube Treatment Diagnosis: colon adenocarcinoma    Colon adenocarcinoma (H), Small bowel obstruction       pantoprazole 40 MG EC tablet    PROTONIX    30 tablet    Take 1 tablet (40 mg) by mouth every morning (before breakfast)    History of recent steroid use       parenteral nutrition - PTA/DISCHARGE ORDER      Inject into the vein daily FVHI        prochlorperazine 10 MG tablet    COMPAZINE     10 mg every 6 hours as needed        sodium chloride 0.9% Soln BOLUS     1000 mL    Inject 1,000 mLs into the vein daily as needed For hydration.        * Notice:  This list has 4 medication(s) that are the same as other medications prescribed for you. Read the directions carefully, and ask your doctor or other care provider to review them with you.

## 2018-09-05 NOTE — PROGRESS NOTES
Infusion Nursing Note:  Sebas Lopez presents today for IV fluids.    Patient seen by provider today: Yes: palliative care provider   present during visit today: Not Applicable.    Note: Pt here for infusion of 1 liter of normal saline. He tolerated infusion well without any issues or side affects.     Intravenous Access:  PICC.    Treatment Conditions:  Lab Results   Component Value Date    HGB 11.1 08/30/2018     Lab Results   Component Value Date    WBC 5.7 08/30/2018      Lab Results   Component Value Date    ANEU 3.4 08/30/2018     Lab Results   Component Value Date     08/30/2018      Lab Results   Component Value Date     09/05/2018                   Lab Results   Component Value Date    POTASSIUM 3.8 09/05/2018           Lab Results   Component Value Date    MAG 1.9 09/05/2018            Lab Results   Component Value Date    CR 0.78 09/05/2018                   Lab Results   Component Value Date    ANNAMARIE 9.0 09/05/2018                Lab Results   Component Value Date    BILITOTAL 0.2 09/05/2018           Lab Results   Component Value Date    ALBUMIN 2.6 09/05/2018                    Lab Results   Component Value Date    ALT 66 09/05/2018           Lab Results   Component Value Date    AST 32 09/05/2018           Post Infusion Assessment:  Patient tolerated infusion without incident.    Discharge Plan:   Patient discharged in stable condition accompanied by: self.  Departure Mode: Ambulatory.    BOB TEJADA RN

## 2018-09-05 NOTE — LETTER
9/5/2018       RE: Sebas Lopez  [address] [place] [address]     Dear Colleague,    Thank you for referring your patient, Sebas Lopez, to the [facility]CER [facility]IC at [facility]. Please see a copy of my visit note below.    Palliative Care Outpatient Clinic Progress Note    Patient Name:  Sebas Lopez  Primary Provider:  Jaguar Becker    Chief Complaint: diarrhea    Interim History:  Sebas Lopez 53 year old male returns to be seen by palliative care today.      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    Last seen in palliative care clinic 8/9/2018, since then started Irinotecan and Panitumumab on 8/17/18. Next round anticpated  9/7, was delayed due to concern for dehydration. Seen in oncology clinic 8/30. At that time he was noted to have lost significant weight with concern for dehydration. His IVFs were increased to 1-2L/day, previously doing 500cc/day. He ahs been clamping his G tube more frequently when he drinks fluids. He has been emptying venting bag 2x/day (about 2L total, which is down from earlier this month). He has been drinking more fluids (~1.5 liters/day). He does not monito his ostomy output regularly, and estimates he is emptying his bag about 3 times/day. Output is ususally watery. Has was instructed to imodium regularly but has been taking this intermittently. No clear reason why he's missing doses, partly endorses forgetting and partly doesn't think he needs it. Despite increasing the duration and frequency of clamping his G-tube, he denies increase in nausea, vomiting or abdominal pain. He is taking zofran about 2x/day, less for nausea and more because he was told it could help his abdomina pain/diarrhea.    He continues on TPN at night for 12 hours, 8pm-8am. Has been  clamping more overnight due to concern for dehydration. Tolerating well. No increase in pain (1-2) no nausea or vomiting.     Coping:  Reports coping well and feeling better overall. No real change    Social History:  Pertinent changes to social history/social situation since last visit: None  Key support resources: Bessie Daugherty (significant other), rinku garcia (mother)  Advance Directive Status: None on file  Social History   Substance Use Topics     Smoking status: Never Smoker     Smokeless tobacco: Never Used     Alcohol use 3.0 oz/week     5 Cans of beer per week      Comment: none for last 4 months       Allergies   Allergen Reactions     Liquid Adhesive Rash     Dermabond, rash with pustules, occurred with abdominal surgery and port removal      Current Outpatient Prescriptions   Medication Sig Dispense Refill     dexamethasone (DECADRON) 4 MG tablet Take 1 tablet (4 mg) by mouth daily 20 tablet 0     fentaNYL (DURAGESIC) 100 mcg/hr 72 hr patch Place 3 patches onto the skin every 72 hours remove old patch. 30 patch 0     hydromorphone HCl 500 MG/50ML SOLN Dilute 250mg in 250ml NS    0.9mg IV every 10 mins as needed    Dispense 1 bag (250mg = 25ml) 25 mL 0     hydromorphone HCl 500 MG/50ML SOLN Dilute 250mg in 250ml NS    0.9mg IV every 10 mins as needed    Dispense 1 bag (250mg = 25ml) (Patient not taking: Reported on 8/30/2018) 25 mL 0     hydromorphone HCl 500 MG/50ML SOLN Dilute 250mg in 250ml NS    0.9mg IV every 10 mins as needed    Dispense 1 bag (250mg = 25ml) (Patient not taking: Reported on 8/30/2018) 25 mL 0     hydromorphone HCl 500 MG/50ML SOLN Dilute 250mg in 250ml NS    0.9mg IV every 10 mins as needed    Dispense 1 bag (250mg = 25ml) (Patient not taking: Reported on 8/30/2018) 25 mL 0     loperamide (IMODIUM) 2 MG capsule Take 2 mg by mouth as needed for diarrhea       LORazepam (ATIVAN) 0.5 MG tablet Take 1 tablet (0.5 mg) by mouth every 4 hours as needed (Anxiety, Nausea/Vomiting or Sleep)  30 tablet 5     naloxone (NARCAN) nasal spray Spray 1 spray (4 mg) into one nostril alternating nostrils as needed for opioid reversal every 2-3 minutes until assistance arrives (Patient not taking: Reported on 8/9/2018) 0.2 mL 0     ondansetron (ZOFRAN-ODT) 8 MG ODT tab Take 1 tablet (8 mg) by mouth every 8 hours as needed for nausea 60 tablet 5     order for DME Equipment being ordered: home suction machine with tubing for connection to venting G-tube  Treatment Diagnosis: colon adenocarcinoma 1 Device 0     pantoprazole (PROTONIX) 40 MG EC tablet Take 1 tablet (40 mg) by mouth every morning (before breakfast) 30 tablet 0     parenteral nutrition - PTA/DISCHARGE ORDER Inject into the vein daily FVHI       prochlorperazine (COMPAZINE) 10 MG tablet 10 mg every 6 hours as needed        sodium chloride 0.9% SOLN BOLUS Inject 1,000 mLs into the vein daily as needed For hydration. 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     Ulcerative colitis (H)      Past Surgical History:   Procedure Laterality Date     COLONOSCOPY  2017     COLONOSCOPY N/A 11/30/2017    Procedure: COLONOSCOPY;  Colonoscopy;  Surgeon: Rob Dudley MD;  Location: [facility]     COLONOSCOPY N/A 2/6/2018    Procedure: COMBINED COLONOSCOPY, STENT PLACEMENT;  COMBINED COLONOSCOPY with Colonic Stent Placement;  Surgeon: Guru Lionel Mar MD;  Location: [facility] OR     COLONOSCOPY N/A 3/19/2018    Procedure: COMBINED COLONOSCOPY, STENT PLACEMENT;  flexible sigmoidoscopy with stent placement and dilation;  Surgeon: Alexis Rios MD;  Location: [facility] OR     COLONOSCOPY N/A 7/5/2018    Procedure: COMBINED COLONOSCOPY, STENT PLACEMENT;   flexible sigmoidoscopy with colonic stent placement ;  Surgeon: Alexis Rios MD;  Location: [facility] OR     GI SURGERY  07/10/2017    Extensive lysis of adhesions, small bowel resection with end ileostomy.      HERNIA REPAIR       INSERT PORT VASCULAR ACCESS Right 8/10/2017    Procedure: INSERT PORT VASCULAR ACCESS;  Single Lumen Right Chest Power Port;  Surgeon: Malena Andrew PA-C;  [facility]     LAPAROSCOPY DIAGNOSTIC (GENERAL) N/A 12/6/2017    Procedure: LAPAROSCOPY DIAGNOSTIC (GENERAL);  Diagnostic Laparoscopy, Exploratory Laparotomy Anesthesia Block ;  Surgeon: Rob Dudley MD;  Location: [facility] OR     LAPAROTOMY EXPLORATORY N/A 12/6/2017    Procedure: LAPAROTOMY EXPLORATORY;;  Surgeon: Rob Dudley MD;  Location: [facility] OR     ORTHOPEDIC SURGERY Left     HIP ARTHROPLASTY     PICC INSERTION Right 02/23/2018    5Fr DL BioFlo PICC, 44cm (3cm external) in the R basilic vein w/ tip in the SVC RA junction       Review of Systems:   ROS: 10 point ROS neg other than the symptoms noted above in the HPI and pertinents here:  Palliative Symptom Review (0=no symptom/no concern, 1=mild, 2=moderate, 3=severe):      Pain: 1-2      Fatigue: 1      Nausea: 1      Constipation: 0      Diarrhea: 2      Depressive Symptoms: 1      Anxiety: 0      Drowsiness: 0      Poor Appetite: 2      Shortness of Breath: 0      Insomnia: 1      Overall (0 good/no concerns, 3 very poor):  1    General: patient appears comfortable and in no distress  HEENT: normocephalic, atraumatic, sclera non-icteric, no nasal discharge, moist mucous membranes  Chest/Respiratory: Symmetric chest rise, no increased work of breathing, lungs clear to auscultation bilaterally  Cardiac: regular rate and rhythm  Abdomen: soft, non-tender, non-distended, ostomy with watery output in bad  Extremities: warm and well perfused, moves all extremities   Skin: warm, dry and intact, no rashes   Neuro: awake, alert and oriented to person, place,  time and situation  Psych: engaged, good eye contact, normal affect, normal thought content    Key Data Reviewed:  LABS: BMP (18) Na 127, Cl 92, , BUN 36, GFR >90. BMP pending for today. C diff negative. 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    1. 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 Continue fentanyl patch and PCA. No acute change. He is alert and is not having side effects from his opiates and appears to be using them safety. He had adequate refills and will see us again in a month.    2. Dehydration - likely secondary to high output from ostomy. Recommended trying to monitor output better as well as scheduling imodium (4mg BID) and zofran BID. Otherwise continue with current PO hydration and intermittent IV fluids. Cr is improved overall.     3. Nause and vomiting - improved with placement of G-tube. No acute changes to regimen      Attending Note:   Patient seen and evaluated with Dr. Calix and I agree with/confirm her findings/recs in this note.     Thank you for involving us in the patient's care.   Lydia Beckham MD / Palliative Medicine / Pager [phone] / [facility] Inpatient Team Consult Pager [phone] (answered 8am-430pm M-F) - ok to text page via [employer] / After-Hours Answering Service [phone] / Palliative Clinic in the [facility] at the [facility] - [phone] (scheduling); [phone] (triage).      Again, thank you for allowing me to participate in the care of your patient.      Sincerely,    Lore Calix MD

## 2018-09-07 NOTE — MR AVS SNAPSHOT
After Visit Summary   9/7/2018    Sebas Lopez    MRN: [id number]           Patient Information     Date Of Birth          1965        Visit Information        Provider Department      9/7/2018 8:00 AM  15 ATC; UC ONCOLOGY INFUSION [facility]        Today's Diagnoses     Peritoneal carcinomatosis (H)    -  1    Cancer of sigmoid colon (H)        Leukocytosis, unspecified type          Care Instructions    Contact Numbers  [facility] Nurse Triage: [phone]  After Hours Nurse Line:  [phone]     Please call the [facility] Triage line if you experience a temperature greater than or equal to 100.5, shaking chills, have uncontrolled nausea, vomiting and/or diarrhea, dizziness, shortness of breath, chest pain, bleeding, unexplained bruising, or if you have any other new/concerning symptoms, questions or concerns.      If it is after hours, weekends, or holidays, please call either the after hours nurse line listed above.      If you are having any concerning symptoms or wish to speak to a provider before your next infusion visit, please call your care coordinator or triage to notify them so we can adequately serve you.      If you need a refill on a narcotic prescription or other medication, please call triage before your infusion appointment.         September 2018 Sunday Monday Tuesday Wednesday Thursday Friday Saturday                                 1       2     [facility] LAB DRAW    9:00 AM   (15 min.)    [facility] LAB DRAW   [facility] Lab Draw     [facility] ONC INFUSION 120    9:30 AM   (120 min.)    ONCOLOGY INFUSION   [facility] 3     4     5     [facility] DRAW    1:15 PM   (15 min.)    [facility] LAB DRAW   [facility] Lab Draw     [facility] RETURN    1:45 PM   (45 min.)   Lore Calix MD   [facility] BMT INFUSION 120    3:00 PM   (120 min.)    BMT INFUSION   [facility] 6     7     [facility] DRAW    6:45 AM   (15 min.)    [facility] LAB DRAW   [facility] Lab Draw     [facility] RETURN    6:45 AM   (50 min.)   Adrianna Pearson APRN CNP   [facility]     [facility] ONC INFUSION 240    8:00 AM   (240 min.)   [facility] ONCOLOGY INFUSION   [facility] 8       9     10     11     12     TELEMEDICINE    8:00 AM   (30 min.)   [facility] 13     14     [facility] LAB DRAW   10:30 AM   (15 min.)    [facility] LAB DRAW   [facility] Lab Draw     [facility] RETURN   10:55 AM   (50 min.)   Linda Alves PA-C   [facility]   12:30 PM   (240 min.)   UC ONCOLOGY INFUSION   [facility] 15       16     17     18     19     20     21     22       23     24     25     26     27     28     29       30 October 2018 Sunday Monday Tuesday Wednesday Thursday Friday Saturday        1     2     3     4     [facility] LAB DRAW   10:00 AM   (15 min.)    [facility] LAB DRAW   [facility] Lab Draw     CT CHEST/ABDOMEN/PELVIS W   10:40 AM   (20 min.)   UCCT2   [facility] CT     [facility] RETURN   12:45 PM   (30 min.)   Albert Long MD   [facility]    1:30 PM   (240 min.)   [facility] ONCOLOGY INFUSION   [facility]     [facility] RETURN    1:45 PM   (30 min.)   Lydia Beckham MD   [facility] 5     6       7     8     9     10     11     12     13       14     15     16     17     18     19     20       21     22     23     24     25     26     27       28     29     30     31                                 Lab Results:  Recent Results (from the past 12 hour(s))   CBC with platelets differential    Collection Time: 09/07/18  7:21 AM   Result Value Ref Range    WBC 13.5 (H) 4.0 - 11.0 10e9/L    RBC Count 3.58 (L)  4.4 - 5.9 10e12/L    Hemoglobin 9.9 (L) 13.3 - 17.7 g/dL    Hematocrit 30.2 (L) 40.0 - 53.0 %    MCV 84 78 - 100 fl    MCH 27.7 26.5 - 33.0 pg    MCHC 32.8 31.5 - 36.5 g/dL    RDW 18.2 (H) 10.0 - 15.0 %    Platelet Count 464 (H) 150 - 450 10e9/L    Diff Method Automated Method     % Neutrophils 74.2 %    % Lymphocytes 13.4 %    % Monocytes 8.5 %    % Eosinophils 0.0 %    % Basophils 0.1 %    % Immature Granulocytes 3.8 %    Nucleated RBCs 0 0 /100    Absolute Neutrophil 10.0 (H) 1.6 - 8.3 10e9/L    Absolute Lymphocytes 1.8 0.8 - 5.3 10e9/L    Absolute Monocytes 1.1 0.0 - 1.3 10e9/L    Absolute Eosinophils 0.0 0.0 - 0.7 10e9/L    Absolute Basophils 0.0 0.0 - 0.2 10e9/L    Abs Immature Granulocytes 0.5 (H) 0 - 0.4 10e9/L    Absolute Nucleated RBC 0.0    Comprehensive metabolic panel    Collection Time: 09/07/18  7:21 AM   Result Value Ref Range    Sodium 132 (L) 133 - 144 mmol/L    Potassium 4.6 3.4 - 5.3 mmol/L    Chloride 103 94 - 109 mmol/L    Carbon Dioxide 21 20 - 32 mmol/L    Anion Gap 8 3 - 14 mmol/L    Glucose 85 70 - 99 mg/dL    Urea Nitrogen 33 (H) 7 - 30 mg/dL    Creatinine 0.76 0.66 - 1.25 mg/dL    GFR Estimate >90 >60 mL/min/1.7m2    GFR Estimate If Black >90 >60 mL/min/1.7m2    Calcium 9.2 8.5 - 10.1 mg/dL    Bilirubin Total 0.2 0.2 - 1.3 mg/dL    Albumin 2.8 (L) 3.4 - 5.0 g/dL    Protein Total 7.6 6.8 - 8.8 g/dL    Alkaline Phosphatase 158 (H) 40 - 150 U/L    ALT 55 0 - 70 U/L    AST 26 0 - 45 U/L   Magnesium    Collection Time: 09/07/18  7:21 AM   Result Value Ref Range    Magnesium 2.0 1.6 - 2.3 mg/dL   Blood culture    Collection Time: 09/07/18  8:30 AM   Result Value Ref Range    Specimen Description Blood     Special Requests Received in aerobic bottle only     Culture Micro PENDING    Blood culture    Collection Time: 09/07/18  8:30 AM   Result Value Ref Range    Specimen Description Blood Right PICC     Special Requests Received in aerobic bottle only     Culture Micro PENDING    Blood culture     Collection Time: 09/07/18  8:30 AM   Result Value Ref Range    Specimen Description Blood Left PICC     Special Requests Received in aerobic bottle only     Culture Micro PENDING                Follow-ups after your visit        Your next 10 appointments already scheduled     Sep 12, 2018  8:00 AM CDT   TELEMEDICINE with Sherry Coronel RPH   [facility] Medication Therapy Management ([facility])    [address]  [address]  [facility] [address] [phone]           Note: this is not an onsite visit; there is no need to come to the facility.            Sep 14, 2018 10:30 AM CDT   [facility] Draw with [facility] DRAW   [facility] Draw ([facility])    [address]  [address]  [facility] [address]   [phone]            Sep 14, 2018 11:10 AM CDT   (Arrive by 10:55 AM)   Return Visit with Linda Alves PA-C   [facility] ([facility])    [address]  [address]  [place] [facility] [address]   [phone]            Sep 14, 2018 12:30 PM CDT   Infusion 240 with UC ONCOLOGY INFUSION, UC 20 ATC   [facility] ([facility])    [address]  [address]  [facility] [address]   [phone]            Oct 04, 2018 10:00 AM CDT   [facility] Draw with [facility] LAB DRAW   [facility] Draw ([facility])    [address]  [address]  [place] [facility] [address]   [phone]            Oct 04, 2018 10:40 AM CDT   CT CHEST/ABDOMEN/PELVIS W CONTRAST with UCCT2   [facility] [place] [facility] ([facility])    [address]  1st [address]  [facility] [address] [phone]           Please bring any scans or X-rays taken at other hospitals, if similar tests were done. Also bring a list of your medicines, including vitamins, minerals and over-the-counter drugs. It is  safest to leave personal items at home.  Be sure to tell your doctor:   If you have any allergies.   If there s any chance you are pregnant.   If you are breastfeeding.  How to prepare:   Do not eat or drink for 2 hours before your exam. If you need to take medicine, you may take it with small sips of water. (We may ask you to take liquid medicine as well.)   Please wear loose clothing, such as a sweat suit or jogging clothes. Avoid snaps, zippers and other metal. We may ask you to undress and put on a hospital gown.  Please arrive 30 minutes early for your CT. Once in the department you might be asked to drink water 15-20 minutes prior to your exam.  If indicated you may be asked to drink an oral contrast in advance of your CT.  If this is the case, the imaging team will let you know or be in contact with you prior to your appointment  Patients over 70 or patients with diabetes or kidney problems:   If you haven t had a blood test (creatinine test) within the last 30 days, the Cardiologist/Radiologist may require you to get this test prior to your exam.  If you have diabetes:   Continue to take your metformin medication on the day of your exam  If you have any questions, please call the Imaging Department where you will have your exam.            Oct 04, 2018  1:00 PM CDT   (Arrive by 12:45 PM)   Return Visit with Albert Long MD   [facility] ([facility])    [address] [place] [address]  [address]  [facility] [address]   [phone]            Oct 04, 2018  1:30 PM CDT   Infusion 240 with  ONCOLOGY INFUSION, UC 32 ATC   [facility] ([facility])    [address] [place] [address]  [address]  [facility] [address]   [phone]              Future tests that were ordered for you today     Open Future Orders        Priority Expected Expires Ordered    CBC with platelets differential Routine 9/28/2018 11/30/2018 9/7/2018    CEA Routine  9/28/2018 11/30/2018 9/7/2018    Comprehensive metabolic panel Routine 9/28/2018 11/30/2018 9/7/2018    CT Chest/Abdomen/Pelvis w Contrast Routine 9/28/2018 11/30/2018 9/7/2018            Who to contact     If you have questions or need follow up information about today's clinic visit or your schedule please contact [facility] directly at [phone].  Normal or non-critical lab and imaging results will be communicated to you by [employer], letter or phone within 4 business days after the clinic has received the results. If you do not hear from us within 7 days, please contact the clinic through [employer] or phone. If you have a critical or abnormal lab result, we will notify you by phone as soon as possible.  Submit refill requests through [employer] or call your pharmacy and they will forward the refill request to us. Please allow 3 business days for your refill to be completed.          Additional Information About Your Visit        [employer] Information     [employer] gives you secure access to your electronic health record. If you see a primary care provider, you can also send messages to your care team and make appointments. If you have questions, please call your primary care clinic.  If you do not have a primary care provider, please call [phone] and they will assist you.        Care EveryWhere ID     This is your Care EveryWhere ID. This could be used by other organizations to access your [place] medical records  [id number]         Blood Pressure from Last 3 Encounters:   09/07/18 118/75   09/05/18 119/67   09/02/18 127/88    Weight from Last 3 Encounters:   09/07/18 64.1 kg (141 lb 4.8 oz)   09/05/18 65 kg (143 lb 4.8 oz)   09/02/18 65.4 kg (144 lb 3.2 oz)              We Performed the Following     Blood culture     Blood culture     Blood culture     CBC with platelets differential     Comprehensive metabolic panel     Magnesium          Where to get your medicines      Some of these will  need a paper prescription and others can be bought over the counter.  Ask your nurse if you have questions.     Bring a paper prescription for each of these medications     fentaNYL 100 mcg/hr 72 hr patch          Primary Care Provider Office Phone # Fax #    Jaguar Becker [phone]       [facility] [address]  [place] [address]        Equal Access to Services     [facility]: Hadii aad ku hadasho Soomaali, waaxda luqadaha, qaybta kaalmada adeegyada, waxay idiin hayaan adeeg kharash la'aan . So [facility] [phone].    ATENCIÓN: Si habla español, tiene a cid disposición servicios gratuitos de asistencia lingüística. Llame al [phone].    We comply with applicable federal civil rights laws and [place] laws. We do not discriminate on the basis of race, color, national origin, age, disability, sex, sexual orientation, or gender identity.            Thank you!     Thank you for choosing [facility]  for your care. Our goal is always to provide you with excellent care. Hearing back from our patients is one way we can continue to improve our services. Please take a few minutes to complete the written survey that you may receive in the mail after your visit with us. Thank you!             Your Updated Medication List - Protect others around you: Learn how to safely use, store and throw away your medicines at www.disposemymeds.org.          This list is accurate as of 9/7/18 10:58 AM.  Always use your most recent med list.                   Brand Name Dispense Instructions for use Diagnosis    dexamethasone 4 MG tablet    DECADRON    20 tablet    Take 1 tablet (4 mg) by mouth daily    Colon adenocarcinoma (H), Neoplasm related pain       fentaNYL 100 mcg/hr 72 hr patch    DURAGESIC    30 patch    Place 3 patches onto the skin every 72 hours remove old patch.    Colon adenocarcinoma (H), Neoplasm related pain, Peritoneal carcinomatosis (H)       * hydromorphone HCl 500  MG/50ML Soln     25 mL    Dilute 250mg in 250ml NS  0.9mg IV every 10 mins as needed  Dispense 1 bag (250mg = 25ml)    Neoplasm related pain       * hydromorphone HCl 500 MG/50ML Soln     25 mL    Dilute 250mg in 250ml NS  0.9mg IV every 10 mins as needed  Dispense 1 bag (250mg = 25ml)    Neoplasm related pain       * hydromorphone HCl 500 MG/50ML Soln     25 mL    Dilute 250mg in 250ml NS  0.9mg IV every 10 mins as needed  Dispense 1 bag (250mg = 25ml)    Neoplasm related pain       * hydromorphone HCl 500 MG/50ML Soln     25 mL    Dilute 250mg in 250ml NS  0.9mg IV every 10 mins as needed  Dispense 1 bag (250mg = 25ml)    Neoplasm related pain       loperamide 2 MG capsule    IMODIUM     Take 2 mg by mouth as needed for diarrhea        LORazepam 0.5 MG tablet    ATIVAN    30 tablet    Take 1 tablet (0.5 mg) by mouth every 4 hours as needed (Anxiety, Nausea/Vomiting or Sleep)    Peritoneal carcinomatosis (H), Cancer of sigmoid colon (H)       naloxone nasal spray    NARCAN    0.2 mL    Spray 1 spray (4 mg) into one nostril alternating nostrils as needed for opioid reversal every 2-3 minutes until assistance arrives    Cancer of sigmoid colon (H), Long term current use of opiate analgesic       ondansetron 8 MG ODT tab    ZOFRAN-ODT    60 tablet    Take 1 tablet (8 mg) by mouth every 8 hours as needed for nausea    Nausea       order for DME     1 Device    Equipment being ordered: home suction machine with tubing for connection to venting G-tube Treatment Diagnosis: colon adenocarcinoma    Colon adenocarcinoma (H), Small bowel obstruction       pantoprazole 40 MG EC tablet    PROTONIX    30 tablet    Take 1 tablet (40 mg) by mouth every morning (before breakfast)    History of recent steroid use       parenteral nutrition - PTA/DISCHARGE ORDER      Inject into the vein daily [facility]        prochlorperazine 10 MG tablet    COMPAZINE     10 mg every 6 hours as needed        sodium chloride 0.9% Soln BOLUS     1000 mL     Inject 1,000 mLs into the vein daily as needed For hydration.        * Notice:  This list has 4 medication(s) that are the same as other medications prescribed for you. Read the directions carefully, and ask your doctor or other care provider to review them with you.

## 2018-09-07 NOTE — NURSING NOTE
Chief Complaint   Patient presents with     Lab Only     labs drawn via picc, saline locked, vitals completed

## 2018-09-07 NOTE — LETTER
9/7/2018       RE: Sebas Lopez  [address] [place] [address]     Dear Colleague,    Thank you for referring your patient, Sebas Lopez, to the [facility]. Please see a copy of my visit note below.    Reason for Visit: seen in f/u of metastatic colon cancer    Oncology HPI: Sebas Lopez is a 53 year old man with a PMH of ulcerative colitis. He presented in May 2017 with an obstructing sigmoid mass, biopsied positive for adenocarcinoma. He developed obstructive symptoms and was found to have peritoneal carcinomatosis at the time of exploratory surgery. He underwent lysis of adhesions and small bowel resection with end ileostomy. He was started was on  FOLFOX from August 2017-June 2018. He was considered for HIPEC in Dec 2017, but at the time of exploratory laparotomy, his peritoenal disease was too extensive. His course was complicated with colonic obstruction for which he was hospitalized in January 2018. He underwent colon stenting He also had complicating bacteremia in Feb 2018, an infected port was removed. He was treated with IV antibiotics via PICC. In May 2018, he was found to have improved disease and the 5FU bolus was discontinued. In July 2018, he was found to have worsening peritoneal disease and colitis. A 3rd colon stent was placed. He was readmitted with SBO and PAULINA and a venting G tube was placed.   He initiated Irinotecan and Panitumumab on 8/17/18. His course has been complicated thus far with dehydration.    Interval history: Sebas is here with his significant other. He is feeling well. In general, he felt the Irinotecan/Panitumumab has been better tolerated than his prior treatments. He is taking about 1500 ml of NS in daily in addition to 2600 ml of TPN. Urine is light in color. Unclear of how much ileostomy output he is having, but emptying the ostomy bag about 3 times a day. Abdominal pain is well controlled with the fentanyl patches and hydromorphone  PCA. He thinks he uses the PCA about 10 times/day. Pain control is much better than it used to be. Is using his Gtube for drainage Urine output is normal. No fevers/chills. No cough, sob, cp, palpitation. No headaches, vision changes. No focal weakness. No rashes or edema. PICC line has been working well.    Current Outpatient Prescriptions   Medication Sig Dispense Refill     dexamethasone (DECADRON) 4 MG tablet Take 1 tablet (4 mg) by mouth daily 20 tablet 0     fentaNYL (DURAGESIC) 100 mcg/hr 72 hr patch Place 3 patches onto the skin every 72 hours remove old patch. 30 patch 0     hydromorphone HCl 500 MG/50ML SOLN Dilute 250mg in 250ml NS    0.9mg IV every 10 mins as needed    Dispense 1 bag (250mg = 25ml) 25 mL 0     hydromorphone HCl 500 MG/50ML SOLN Dilute 250mg in 250ml NS    0.9mg IV every 10 mins as needed    Dispense 1 bag (250mg = 25ml) (Patient not taking: Reported on 8/30/2018) 25 mL 0     hydromorphone HCl 500 MG/50ML SOLN Dilute 250mg in 250ml NS    0.9mg IV every 10 mins as needed    Dispense 1 bag (250mg = 25ml) (Patient not taking: Reported on 8/30/2018) 25 mL 0     hydromorphone HCl 500 MG/50ML SOLN Dilute 250mg in 250ml NS    0.9mg IV every 10 mins as needed    Dispense 1 bag (250mg = 25ml) (Patient not taking: Reported on 8/30/2018) 25 mL 0     loperamide (IMODIUM) 2 MG capsule Take 2 mg by mouth as needed for diarrhea       LORazepam (ATIVAN) 0.5 MG tablet Take 1 tablet (0.5 mg) by mouth every 4 hours as needed (Anxiety, Nausea/Vomiting or Sleep) 30 tablet 5     naloxone (NARCAN) nasal spray Spray 1 spray (4 mg) into one nostril alternating nostrils as needed for opioid reversal every 2-3 minutes until assistance arrives (Patient not taking: Reported on 8/9/2018) 0.2 mL 0     ondansetron (ZOFRAN-ODT) 8 MG ODT tab Take 1 tablet (8 mg) by mouth every 8 hours as needed for nausea 60 tablet 5     order for DME Equipment being ordered: home suction machine with tubing for connection to venting  "G-tube  Treatment Diagnosis: colon adenocarcinoma (Patient not taking: Reported on 9/5/2018) 1 Device 0     pantoprazole (PROTONIX) 40 MG EC tablet Take 1 tablet (40 mg) by mouth every morning (before breakfast) 30 tablet 0     parenteral nutrition - PTA/DISCHARGE ORDER Inject into the vein daily [facility]       prochlorperazine (COMPAZINE) 10 MG tablet 10 mg every 6 hours as needed        sodium chloride 0.9% SOLN BOLUS Inject 1,000 mLs into the vein daily as needed For hydration. 1000 mL 0          Allergies   Allergen Reactions     Liquid Adhesive Rash     Dermabond, rash with pustules, occurred with abdominal surgery and port removal          Exam: alert, appears chronically ill. Blood pressure 118/75, pulse 93, temperature 98  F (36.7  C), temperature source Oral, height 1.753 m (5' 9.02\"), weight 64.1 kg (141 lb 4.8 oz), SpO2 99 %.  Wt Readings from Last 4 Encounters:   09/07/18 64.1 kg (141 lb 4.8 oz)   09/05/18 65 kg (143 lb 4.8 oz)   09/02/18 65.4 kg (144 lb 3.2 oz)   08/30/18 63.2 kg (139 lb 4.8 oz)     Oropharynx is moist, no focal lesion. No icterus. Neck supple and without adenopathy. Lungs:CTA. Heart:RRR, no murmur or rub. Abodomen: distended, mildly tender in the LLQ, ileostomy with liquid stool, G tube site without erythema/tenderness. Extremities: warm, no edema. PICC in the right arm without erythema or drainage at the insertion site.    Labs: Results for KARINA MINER (MRN [id number]) as of 9/7/2018 18:05   Ref. Range 9/7/2018 07:21   Sodium Latest Ref Range: 133 - 144 mmol/L 132 (L)   Potassium Latest Ref Range: 3.4 - 5.3 mmol/L 4.6   Chloride Latest Ref Range: 94 - 109 mmol/L 103   Carbon Dioxide Latest Ref Range: 20 - 32 mmol/L 21   Urea Nitrogen Latest Ref Range: 7 - 30 mg/dL 33 (H)   Creatinine Latest Ref Range: 0.66 - 1.25 mg/dL 0.76   GFR Estimate Latest Ref Range: >60 mL/min/1.7m2 >90   GFR Estimate If Black Latest Ref Range: >60 mL/min/1.7m2 >90   Calcium Latest Ref Range: 8.5 - 10.1 " mg/dL 9.2   Anion Gap Latest Ref Range: 3 - 14 mmol/L 8   Magnesium Latest Ref Range: 1.6 - 2.3 mg/dL 2.0   Albumin Latest Ref Range: 3.4 - 5.0 g/dL 2.8 (L)   Protein Total Latest Ref Range: 6.8 - 8.8 g/dL 7.6   Bilirubin Total Latest Ref Range: 0.2 - 1.3 mg/dL 0.2   Alkaline Phosphatase Latest Ref Range: 40 - 150 U/L 158 (H)   ALT Latest Ref Range: 0 - 70 U/L 55   AST Latest Ref Range: 0 - 45 U/L 26   Glucose Latest Ref Range: 70 - 99 mg/dL 85   WBC Latest Ref Range: 4.0 - 11.0 10e9/L 13.5 (H)   Hemoglobin Latest Ref Range: 13.3 - 17.7 g/dL 9.9 (L)   Hematocrit Latest Ref Range: 40.0 - 53.0 % 30.2 (L)   Platelet Count Latest Ref Range: 150 - 450 10e9/L 464 (H)   RBC Count Latest Ref Range: 4.4 - 5.9 10e12/L 3.58 (L)   MCV Latest Ref Range: 78 - 100 fl 84   MCH Latest Ref Range: 26.5 - 33.0 pg 27.7   MCHC Latest Ref Range: 31.5 - 36.5 g/dL 32.8   RDW Latest Ref Range: 10.0 - 15.0 % 18.2 (H)   Diff Method Unknown Automated Method   % Neutrophils Latest Units: % 74.2   % Lymphocytes Latest Units: % 13.4   % Monocytes Latest Units: % 8.5   % Eosinophils Latest Units: % 0.0   % Basophils Latest Units: % 0.1   % Immature Granulocytes Latest Units: % 3.8   Nucleated RBCs Latest Ref Range: 0 /100 0   Absolute Neutrophil Latest Ref Range: 1.6 - 8.3 10e9/L 10.0 (H)   Absolute Lymphocytes Latest Ref Range: 0.8 - 5.3 10e9/L 1.8   Absolute Monocytes Latest Ref Range: 0.0 - 1.3 10e9/L 1.1   Absolute Eosinophils Latest Ref Range: 0.0 - 0.7 10e9/L 0.0   Absolute Basophils Latest Ref Range: 0.0 - 0.2 10e9/L 0.0   Abs Immature Granulocytes Latest Ref Range: 0 - 0.4 10e9/L 0.5 (H)   Absolute Nucleated RBC Unknown 0.0       Imaging: n/a    Impression/plan:   1. Stage IV colon cancer with peritoneal carcinomatosis and an obstructing sigmoid colon mass  -progressed on FOLFOX, now on Irinotecan/Panitumumab since 8/17/18  -he feels this treatment has been fairly well tolerated. Reviewed hydration concerns with diarrhea. Need to monitor  for s/s of dehydration  -wbc elevated ,but no s/s of infection today. OK to proceed with chemotherapy.  -reviewed f/u plan. Has f/u with [facility] next week. They would like to consider cancelling. Explained that we could touch base over the phone next week and assess his labs/hydration before cancelling appointment. Will arrange f/u with Dr. Long with restaging CT after this cycle    2. Chronic bowel obstruction s/p placement of venting G tube, on TPN  -sx are managed well with venting tube currently.  -pain is well controlled with fentanyl patches and hydromorphone pca. Refill of fentanyl patchs given today    3. FEN: maintaining with NS 1.5 L and TPN 2.6 L IV daily  -difficult to assess hydration status as he isn't tracking output carefully, but urine output appears wnl and Cr stable  -lytes stable at this time    4. Leukocytosis-  -no s/s of infection. Given hx of bacteremia, check blood culture from PICC x 2 lumens and peripheral  -recheck labs on Tues through [facility]    Again, thank you for allowing me to participate in the care of your patient.      Sincerely,    Adrianna Pearson, ALEXA CNP

## 2018-09-07 NOTE — NURSING NOTE
"Oncology Rooming Note    September 7, 2018 7:33 AM   Sebas Lopez is a 53 year old male who presents for:    Chief Complaint   Patient presents with     Lab Only     labs drawn via picc, saline locked, vitals completed     Oncology Clinic Visit     F/U Colon Ca     Initial Vitals: /75  Pulse 93  Temp 98  F (36.7  C) (Oral)  Ht 1.753 m (5' 9.02\")  Wt 64.1 kg (141 lb 4.8 oz)  SpO2 99%  BMI 20.86 kg/m2 Estimated body mass index is 20.86 kg/(m^2) as calculated from the following:    Height as of this encounter: 1.753 m (5' 9.02\").    Weight as of this encounter: 64.1 kg (141 lb 4.8 oz). Body surface area is 1.77 meters squared.  Mild Pain (2) Comment: Data Unavailable   No LMP for male patient.  Allergies reviewed: Yes  Medications reviewed: Yes    Medications: MEDICATION REFILLS NEEDED TODAY. Provider was notified.  Pharmacy name entered into [facility]: [facility] - [address]    Clinical concerns: Yes, Patient states he has a little abdominal pain. Adrianna was notified.    10 minutes for nursing intake (face to face time)     ERIC HOYOS LPN            "

## 2018-09-07 NOTE — PATIENT INSTRUCTIONS
Contact Numbers  [facility] Nurse Triage: [phone]  After Hours Nurse Line:  [phone]     Please call the [facility] Triage line if you experience a temperature greater than or equal to 100.5, shaking chills, have uncontrolled nausea, vomiting and/or diarrhea, dizziness, shortness of breath, chest pain, bleeding, unexplained bruising, or if you have any other new/concerning symptoms, questions or concerns.      If it is after hours, weekends, or holidays, please call either the after hours nurse line listed above.      If you are having any concerning symptoms or wish to speak to a provider before your next infusion visit, please call your care coordinator or triage to notify them so we can adequately serve you.      If you need a refill on a narcotic prescription or other medication, please call triage before your infusion appointment.         September 2018 Sunday Monday Tuesday Wednesday Thursday Friday Saturday                                 1       2     [facility] D[facility]    9:00 AM   (15 min.)    [facility]   [facility] Lab Draw     [facility] ONC INFUSION 120    9:30 AM   (120 min.)    ONCOLOGY INFUSION   [facility] 3     4     5     [facility] D[facility]    1:15 PM   (15 min.)   [facility]   [facility] Lab Draw     [facility] RETURN    1:45 PM   (45 min.)   Lore Calix MD   [facility] BMT INFUSION 120    3:00 PM   (120 min.)    BMT INFUSION   [facility] 6     7     [facility] DRAW    6:45 AM   (15 min.)    [facility] D[facility]   [facility] Lab Draw     [facility] RETURN    6:45 AM   (50 min.)   Adrianna Pearson APRN CNP   [facility] ONC INFUSION 240    8:00 AM   (240 min.)    ONCOLOGY INFUSION   [facility] 8       9     10     11     12     TELEMEDICINE    8:00 AM   (30 min.)   Sherry Coronel [facility]  Management 13     14     [facility] D[facility]   10:30 AM   (15 min.)    [facility] DRAW   [facility] Lab Draw     [facility] RETURN   10:55 AM   (50 min.)   Linda Alves [facility]-C   [facility]   12:30 PM   (240 min.)   [facility] ONCOLOGY INFUSION   [facility] 15       16     17     18     19     20     21     22       23     24     25     26     27     28     29       30 October 2018 Sunday Monday Tuesday Wednesday Thursday Friday Saturday        1     2     3     4     [facility] DRAW   10:00 AM   (15 min.)   [facility] DRAW   [facility] Lab Draw     CT CHEST/ABDOMEN/PELVIS W   10:40 AM   (20 min.)   UCCT2   [facility] CT     [facility] RETURN   12:45 PM   (30 min.)   Albert Long MD   [facility] ONC INFUSION 240    1:30 PM   (240 min.)   [facility] ONCOLOGY INFUSION   [facility]     [facility] RETURN    1:45 PM   (30 min.)   Lydia Beckham MD   [facility] 5     6       7     8     9     10     11     12     13       14     15     16     17     18     19     20       21     22     23     24     25     26     27       28     29     30     31                                 Lab Results:  Recent Results (from the past 12 hour(s))   CBC with platelets differential    Collection Time: 09/07/18  7:21 AM   Result Value Ref Range    WBC 13.5 (H) 4.0 - 11.0 10e9/L    RBC Count 3.58 (L) 4.4 - 5.9 10e12/L    Hemoglobin 9.9 (L) 13.3 - 17.7 g/dL    Hematocrit 30.2 (L) 40.0 - 53.0 %    MCV 84 78 - 100 fl    MCH 27.7 26.5 - 33.0 pg    MCHC 32.8 31.5 - 36.5 g/dL    RDW 18.2 (H) 10.0 - 15.0 %    Platelet Count 464 (H) 150 - 450 10e9/L    Diff Method Automated Method     % Neutrophils 74.2 %    % Lymphocytes 13.4 %    % Monocytes 8.5 %    % Eosinophils 0.0 %    % Basophils 0.1 %    % Immature Granulocytes 3.8 %    Nucleated RBCs 0 0 /100     Absolute Neutrophil 10.0 (H) 1.6 - 8.3 10e9/L    Absolute Lymphocytes 1.8 0.8 - 5.3 10e9/L    Absolute Monocytes 1.1 0.0 - 1.3 10e9/L    Absolute Eosinophils 0.0 0.0 - 0.7 10e9/L    Absolute Basophils 0.0 0.0 - 0.2 10e9/L    Abs Immature Granulocytes 0.5 (H) 0 - 0.4 10e9/L    Absolute Nucleated RBC 0.0    Comprehensive metabolic panel    Collection Time: 09/07/18  7:21 AM   Result Value Ref Range    Sodium 132 (L) 133 - 144 mmol/L    Potassium 4.6 3.4 - 5.3 mmol/L    Chloride 103 94 - 109 mmol/L    Carbon Dioxide 21 20 - 32 mmol/L    Anion Gap 8 3 - 14 mmol/L    Glucose 85 70 - 99 mg/dL    Urea Nitrogen 33 (H) 7 - 30 mg/dL    Creatinine 0.76 0.66 - 1.25 mg/dL    GFR Estimate >90 >60 mL/min/1.7m2    GFR Estimate If Black >90 >60 mL/min/1.7m2    Calcium 9.2 8.5 - 10.1 mg/dL    Bilirubin Total 0.2 0.2 - 1.3 mg/dL    Albumin 2.8 (L) 3.4 - 5.0 g/dL    Protein Total 7.6 6.8 - 8.8 g/dL    Alkaline Phosphatase 158 (H) 40 - 150 U/L    ALT 55 0 - 70 U/L    AST 26 0 - 45 U/L   Magnesium    Collection Time: 09/07/18  7:21 AM   Result Value Ref Range    Magnesium 2.0 1.6 - 2.3 mg/dL   Blood culture    Collection Time: 09/07/18  8:30 AM   Result Value Ref Range    Specimen Description Blood     Special Requests Received in aerobic bottle only     Culture Micro PENDING    Blood culture    Collection Time: 09/07/18  8:30 AM   Result Value Ref Range    Specimen Description Blood Right PICC     Special Requests Received in aerobic bottle only     Culture Micro PENDING    Blood culture    Collection Time: 09/07/18  8:30 AM   Result Value Ref Range    Specimen Description Blood Left PICC     Special Requests Received in aerobic bottle only     Culture Micro PENDING

## 2018-09-07 NOTE — PROGRESS NOTES
Reason for Visit: seen in f/u of metastatic colon cancer    Oncology HPI: Sebas Lopez is a 53 year old man with a PMH of ulcerative colitis. He presented in May 2017 with an obstructing sigmoid mass, biopsied positive for adenocarcinoma. He developed obstructive symptoms and was found to have peritoneal carcinomatosis at the time of exploratory surgery. He underwent lysis of adhesions and small bowel resection with end ileostomy. He was started was on  FOLFOX from August 2017-June 2018. He was considered for HIPEC in Dec 2017, but at the time of exploratory laparotomy, his peritoenal disease was too extensive. His course was complicated with colonic obstruction for which he was hospitalized in January 2018. He underwent colon stenting He also had complicating bacteremia in Feb 2018, an infected port was removed. He was treated with IV antibiotics via PICC. In May 2018, he was found to have improved disease and the 5FU bolus was discontinued. In July 2018, he was found to have worsening peritoneal disease and colitis. A 3rd colon stent was placed. He was readmitted with SBO and PAULINA and a venting G tube was placed.   He initiated Irinotecan and Panitumumab on 8/17/18. His course has been complicated thus far with dehydration.    Interval history: Sebas is here with his significant other. He is feeling well. In general, he felt the Irinotecan/Panitumumab has been better tolerated than his prior treatments. He is taking about 1500 ml of NS in daily in addition to 2600 ml of TPN. Urine is light in color. Unclear of how much ileostomy output he is having, but emptying the ostomy bag about 3 times a day. Abdominal pain is well controlled with the fentanyl patches and hydromorphone PCA. He thinks he uses the PCA about 10 times/day. Pain control is much better than it used to be. Is using his Gtube for drainage Urine output is normal. No fevers/chills. No cough, sob, cp, palpitation. No headaches, vision changes. No focal  weakness. No rashes or edema. PICC line has been working well.    Current Outpatient Prescriptions   Medication Sig Dispense Refill     dexamethasone (DECADRON) 4 MG tablet Take 1 tablet (4 mg) by mouth daily 20 tablet 0     fentaNYL (DURAGESIC) 100 mcg/hr 72 hr patch Place 3 patches onto the skin every 72 hours remove old patch. 30 patch 0     hydromorphone HCl 500 MG/50ML SOLN Dilute 250mg in 250ml NS    0.9mg IV every 10 mins as needed    Dispense 1 bag (250mg = 25ml) 25 mL 0     hydromorphone HCl 500 MG/50ML SOLN Dilute 250mg in 250ml NS    0.9mg IV every 10 mins as needed    Dispense 1 bag (250mg = 25ml) (Patient not taking: Reported on 8/30/2018) 25 mL 0     hydromorphone HCl 500 MG/50ML SOLN Dilute 250mg in 250ml NS    0.9mg IV every 10 mins as needed    Dispense 1 bag (250mg = 25ml) (Patient not taking: Reported on 8/30/2018) 25 mL 0     hydromorphone HCl 500 MG/50ML SOLN Dilute 250mg in 250ml NS    0.9mg IV every 10 mins as needed    Dispense 1 bag (250mg = 25ml) (Patient not taking: Reported on 8/30/2018) 25 mL 0     loperamide (IMODIUM) 2 MG capsule Take 2 mg by mouth as needed for diarrhea       LORazepam (ATIVAN) 0.5 MG tablet Take 1 tablet (0.5 mg) by mouth every 4 hours as needed (Anxiety, Nausea/Vomiting or Sleep) 30 tablet 5     naloxone (NARCAN) nasal spray Spray 1 spray (4 mg) into one nostril alternating nostrils as needed for opioid reversal every 2-3 minutes until assistance arrives (Patient not taking: Reported on 8/9/2018) 0.2 mL 0     ondansetron (ZOFRAN-ODT) 8 MG ODT tab Take 1 tablet (8 mg) by mouth every 8 hours as needed for nausea 60 tablet 5     order for DME Equipment being ordered: home suction machine with tubing for connection to venting G-tube  Treatment Diagnosis: colon adenocarcinoma (Patient not taking: Reported on 9/5/2018) 1 Device 0     pantoprazole (PROTONIX) 40 MG EC tablet Take 1 tablet (40 mg) by mouth every morning (before breakfast) 30 tablet 0     parenteral nutrition  "- PTA/DISCHARGE ORDER Inject into the vein daily [facility]       prochlorperazine (COMPAZINE) 10 MG tablet 10 mg every 6 hours as needed        sodium chloride 0.9% SOLN BOLUS Inject 1,000 mLs into the vein daily as needed For hydration. 1000 mL 0          Allergies   Allergen Reactions     Liquid Adhesive Rash     Dermabond, rash with pustules, occurred with abdominal surgery and port removal          Exam: alert, appears chronically ill. Blood pressure 118/75, pulse 93, temperature 98  F (36.7  C), temperature source Oral, height 1.753 m (5' 9.02\"), weight 64.1 kg (141 lb 4.8 oz), SpO2 99 %.  Wt Readings from Last 4 Encounters:   09/07/18 64.1 kg (141 lb 4.8 oz)   09/05/18 65 kg (143 lb 4.8 oz)   09/02/18 65.4 kg (144 lb 3.2 oz)   08/30/18 63.2 kg (139 lb 4.8 oz)     Oropharynx is moist, no focal lesion. No icterus. Neck supple and without adenopathy. Lungs:CTA. Heart:RRR, no murmur or rub. Abodomen: distended, mildly tender in the LLQ, ileostomy with liquid stool, G tube site without erythema/tenderness. Extremities: warm, no edema. PICC in the right arm without erythema or drainage at the insertion site.    Labs: Results for KARINA MINER (MRN [id number]) as of 9/7/2018 18:05   Ref. Range 9/7/2018 07:21   Sodium Latest Ref Range: 133 - 144 mmol/L 132 (L)   Potassium Latest Ref Range: 3.4 - 5.3 mmol/L 4.6   Chloride Latest Ref Range: 94 - 109 mmol/L 103   Carbon Dioxide Latest Ref Range: 20 - 32 mmol/L 21   Urea Nitrogen Latest Ref Range: 7 - 30 mg/dL 33 (H)   Creatinine Latest Ref Range: 0.66 - 1.25 mg/dL 0.76   GFR Estimate Latest Ref Range: >60 mL/min/1.7m2 >90   GFR Estimate If Black Latest Ref Range: >60 mL/min/1.7m2 >90   Calcium Latest Ref Range: 8.5 - 10.1 mg/dL 9.2   Anion Gap Latest Ref Range: 3 - 14 mmol/L 8   Magnesium Latest Ref Range: 1.6 - 2.3 mg/dL 2.0   Albumin Latest Ref Range: 3.4 - 5.0 g/dL 2.8 (L)   Protein Total Latest Ref Range: 6.8 - 8.8 g/dL 7.6   Bilirubin Total Latest Ref Range: 0.2 - " 1.3 mg/dL 0.2   Alkaline Phosphatase Latest Ref Range: 40 - 150 U/L 158 (H)   ALT Latest Ref Range: 0 - 70 U/L 55   AST Latest Ref Range: 0 - 45 U/L 26   Glucose Latest Ref Range: 70 - 99 mg/dL 85   WBC Latest Ref Range: 4.0 - 11.0 10e9/L 13.5 (H)   Hemoglobin Latest Ref Range: 13.3 - 17.7 g/dL 9.9 (L)   Hematocrit Latest Ref Range: 40.0 - 53.0 % 30.2 (L)   Platelet Count Latest Ref Range: 150 - 450 10e9/L 464 (H)   RBC Count Latest Ref Range: 4.4 - 5.9 10e12/L 3.58 (L)   MCV Latest Ref Range: 78 - 100 fl 84   MCH Latest Ref Range: 26.5 - 33.0 pg 27.7   MCHC Latest Ref Range: 31.5 - 36.5 g/dL 32.8   RDW Latest Ref Range: 10.0 - 15.0 % 18.2 (H)   Diff Method Unknown Automated Method   % Neutrophils Latest Units: % 74.2   % Lymphocytes Latest Units: % 13.4   % Monocytes Latest Units: % 8.5   % Eosinophils Latest Units: % 0.0   % Basophils Latest Units: % 0.1   % Immature Granulocytes Latest Units: % 3.8   Nucleated RBCs Latest Ref Range: 0 /100 0   Absolute Neutrophil Latest Ref Range: 1.6 - 8.3 10e9/L 10.0 (H)   Absolute Lymphocytes Latest Ref Range: 0.8 - 5.3 10e9/L 1.8   Absolute Monocytes Latest Ref Range: 0.0 - 1.3 10e9/L 1.1   Absolute Eosinophils Latest Ref Range: 0.0 - 0.7 10e9/L 0.0   Absolute Basophils Latest Ref Range: 0.0 - 0.2 10e9/L 0.0   Abs Immature Granulocytes Latest Ref Range: 0 - 0.4 10e9/L 0.5 (H)   Absolute Nucleated RBC Unknown 0.0       Imaging: n/a    Impression/plan:   1. Stage IV colon cancer with peritoneal carcinomatosis and an obstructing sigmoid colon mass  -progressed on FOLFOX, now on Irinotecan/Panitumumab since 8/17/18  -he feels this treatment has been fairly well tolerated. Reviewed hydration concerns with diarrhea. Need to monitor for s/s of dehydration  -wbc elevated ,but no s/s of infection today. OK to proceed with chemotherapy.  -reviewed f/u plan. Has f/u with [facility] next week. They would like to consider cancelling. Explained that we could touch base over the phone  next week and assess his labs/hydration before cancelling appointment. Will arrange f/u with Dr. Long with restaging CT after this cycle    2. Chronic bowel obstruction s/p placement of venting G tube, on TPN  -sx are managed well with venting tube currently.  -pain is well controlled with fentanyl patches and hydromorphone pca. Refill of fentanyl patchs given today    3. FEN: maintaining with NS 1.5 L and TPN 2.6 L IV daily  -difficult to assess hydration status as he isn't tracking output carefully, but urine output appears wnl and Cr stable  -lytes stable at this time    4. Leukocytosis-  -no s/s of infection. Given hx of bacteremia, check blood culture from PICC x 2 lumens and peripheral  -recheck labs on Tues through [facility]

## 2018-09-07 NOTE — PROGRESS NOTES
Infusion Nursing Note:  Sebas Lopez presents for D1C2 Irinotecan, Vectibix  Met with Adrianna Pearson NP  before infusion.    Note: Patient presents to infusion feeling good today. Per Adrianna Pearson NP, 3 blood cultures were to be drawn from patient due to leukocytosis and history of bacteremia . 2 samples drawn from each lumen of PICC line and 1 from a peripheral line. One lumen of PICC line was sluggish with blood return and had a lot of resistance with saline flushing. Alteplase ordered and instilled into sluggish line. Pain pump reconnected into working PICC lumen and chemo infused into a peripheral IV.  Following alteplase instillation, sluggish line flushed and kathie back without resistance. Caps changed in both lumens.     Treatment Conditions:  Lab Results   Component Value Date    HGB 9.9 09/07/2018     Lab Results   Component Value Date    WBC 13.5 09/07/2018      Lab Results   Component Value Date    ANEU 10.0 09/07/2018     Lab Results   Component Value Date     09/07/2018      Lab Results   Component Value Date     09/07/2018                   Lab Results   Component Value Date    POTASSIUM 4.6 09/07/2018           Lab Results   Component Value Date    MAG 2.0 09/07/2018            Lab Results   Component Value Date    CR 0.76 09/07/2018                   Lab Results   Component Value Date    ANNAMARIE 9.2 09/07/2018                Lab Results   Component Value Date    BILITOTAL 0.2 09/07/2018           Lab Results   Component Value Date    ALBUMIN 2.8 09/07/2018                    Lab Results   Component Value Date    ALT 55 09/07/2018           Lab Results   Component Value Date    AST 26 09/07/2018       Results reviewed, labs MET treatment parameters, ok to proceed with treatment.    Intravenous Access:  Peripheral IV placed.  PICC.    Post Infusion Assessment:  Patient tolerated infusion without incident.  Blood return noted pre and post infusion.  Site patent and intact, free from  redness, edema or discomfort.  No evidence of extravasations.  Access discontinued per protocol.    Discharge Plan:   Patient declined prescription refills. Has fentanyl script they will fill elsewhere.  Patient and/or family verbalized understanding of discharge instructions and all questions answered.  Copy of AVS reviewed with patient and/or family.  Patient will return 9/14 for next appointment.  Patient discharged in stable condition accompanied by: wife.    Marylin Schulte RN

## 2018-09-07 NOTE — MR AVS SNAPSHOT
After Visit Summary   9/7/2018    Sebas Lopez    MRN: [id number]           Patient Information     Date Of Birth          1965        Visit Information        Provider Department      9/7/2018 7:00 AM Adrianna Pearson APRN CNP [facility]        Today's Diagnoses     Leukocytosis, unspecified type    -  1    Colon adenocarcinoma (H)        Neoplasm related pain        Peritoneal carcinomatosis (H)        Cancer of sigmoid colon (H)           Follow-ups after your visit        Your next 10 appointments already scheduled     Sep 12, 2018  8:00 AM CDT   TELEMEDICINE with Sherry Coronel [facility] Medication Therapy Management ([facility])    [address]  [address]  [place] [facility] [place] [facility] [address]   [phone]           Note: this is not an onsite visit; there is no need to come to the facility.            Sep 14, 2018 10:30 AM CDT   Masonic Lab Draw with UC MASONIC LAB DRAW   [facility] Masonic Lab Draw ([facility])    [address]  [address]  [place] [address]   [phone]            Sep 14, 2018 11:10 AM CDT   (Arrive by 10:55 AM)   Return Visit with Linda Alves PA-C   [facility] ([facility])    [address]  [address]  [place] [address]   [phone]            Sep 14, 2018 12:30 PM CDT   Infusion 240 with UC ONCOLOGY INFUSION, UC 20 ATC   [facility] ([facility])    [address]  [address]  [place] [address]   [phone]            Oct 04, 2018 10:00 AM CDT   Masonic Lab Draw with UC MASONIC LAB DRAW   [facility] Masonic Lab Draw ([facility])    [address]  [address]  [place] [address]   [phone]            Oct 04, 2018 10:40 AM CDT   CT CHEST/ABDOMEN/PELVIS W CONTRAST with UCCT2   [facility] [place] [facility] ([facility]  [facility])    [address]  [address] [facility] [address] [phone]           How do I prepare for my exam? (Food and drink instructions) To prepare: Do not eat or drink for 2 hours before your exam. If you need to take medicine, you may take it with small sips of water. (We may ask you to take liquid medicine as well.)  How do I prepare for my exam? (Other instructions) Please arrive 30 minutes early for your CT.  Once in the department you might be asked to drink water 15-20 minutes prior to your exam.  If indicated you may be asked to drink an oral contrast in advance of your CT.  If this is the case, the imaging team will let you know or be in contact with you prior to your appointment  Patients over 70 or patients with diabetes or kidney problems: If you haven t had a blood test (creatinine test) within the last 30 days, the Cardiologist/Radiologist may require you to get this test prior to your exam.  If you have diabetes:  Continue to take your metformin medication on the day of your exam  What should I wear: Please wear loose clothing, such as a sweat suit or jogging clothes. Avoid snaps, zippers and other metal. We may ask you to undress and put on a hospital gown.  How long does the exam take: Most scans take less than 20 minutes.  What should I bring: Please bring any scans or X-rays taken at other hospitals, if similar tests were done. Also bring a list of your medicines, including vitamins, minerals and over-the-counter drugs. It is safest to leave personal items at home.  Do I need a : No  is needed.  What do I need to tell my doctor? Be sure to tell your doctor: * If you have any allergies. * If there s any chance you are pregnant. * If you are breastfeeding.  What should I do after the exam: No restrictions, You may resume normal activities.  What is this test: A CT (computed tomography) scan is a series of pictures that allows us to look inside your  body. The scanner creates images of the body in cross sections, much like slices of bread. This helps us see any problems more clearly. You may receive contrast (X-ray dye) before or during your scan. You will be asked to drink the contrast.  Who should I call with questions: If you have any questions, please call the Imaging Department where you will have your exam. Directions, parking instructions, and other information is available on our website, [email or web address].            Oct 04, 2018  1:00 PM CDT   (Arrive by 12:45 PM)   Return Visit with Albert Long MD   [facility] ([facility])    [address] [place] [address]  [address]  [facility] [address] [phone]            Oct 04, 2018  1:30 PM CDT   Infusion 240 with  ONCOLOGY INFUSION, UC 32 ATC   [facility] ([facility])    [address] [place] [address]  [address]  [facility] [address] [phone]              Who to contact     If you have questions or need follow up information about today's clinic visit or your schedule please contact [facility] directly at [phone].  Normal or non-critical lab and imaging results will be communicated to you by MyChart, letter or phone within 4 business days after the clinic has received the results. If you do not hear from us within 7 days, please contact the clinic through [employer] or phone. If you have a critical or abnormal lab result, we will notify you by phone as soon as possible.  Submit refill requests through [employer] or call your pharmacy and they will forward the refill request to us. Please allow 3 business days for your refill to be completed.          Additional Information About Your Visit        [employer] Information     [employer] gives you secure access to your electronic health record. If you see a primary care provider, you can also send messages to your care team and make appointments. If you  "have questions, please call your primary care clinic.  If you do not have a primary care provider, please call [phone] and they will assist you.        Care EveryWhere ID     This is your Care EveryWhere ID. This could be used by other organizations to access your [place] medical records  [id number]        Your Vitals Were     Pulse Temperature Height Pulse Oximetry BMI (Body Mass Index)       93 98  F (36.7  C) (Oral) 1.753 m (5' 9.02\") 99% 20.86 kg/m2        Blood Pressure from Last 3 Encounters:   09/07/18 118/75   09/05/18 119/67   09/02/18 127/88    Weight from Last 3 Encounters:   09/07/18 64.1 kg (141 lb 4.8 oz)   09/05/18 65 kg (143 lb 4.8 oz)   09/02/18 65.4 kg (144 lb 3.2 oz)                 Where to get your medicines      Some of these will need a paper prescription and others can be bought over the counter.  Ask your nurse if you have questions.     Bring a paper prescription for each of these medications     fentaNYL 100 mcg/hr 72 hr patch         Information about OPIOIDS     PRESCRIPTION OPIOIDS: WHAT YOU NEED TO KNOW   We gave you an opioid (narcotic) pain medicine. It is important to manage your pain, but opioids are not always the best choice. You should first try all the other options your care team gave you. Take this medicine for as short a time (and as few doses) as possible.    Some activities can increase your pain, such as bandage changes or therapy sessions. It may help to take your pain medicine 30 to 60 minutes before these activities. Reduce your stress by getting enough sleep, working on hobbies you enjoy and practicing relaxation or meditation. Talk to your care team about ways to manage your pain beyond prescription opioids.    These medicines have risks:    DO NOT drive when on new or higher doses of pain medicine. These medicines can affect your alertness and reaction times, and you could be arrested for driving under the influence (DUI). If you need to use opioids " long-term, talk to your care team about driving.    DO NOT operate heavy machinery    DO NOT do any other dangerous activities while taking these medicines.    DO NOT drink any alcohol while taking these medicines.     If the opioid prescribed includes acetaminophen, DO NOT take with any other medicines that contain acetaminophen. Read all labels carefully. Look for the word  acetaminophen  or  Tylenol.  Ask your pharmacist if you have questions or are unsure.    You can get addicted to pain medicines, especially if you have a history of addiction (chemical, alcohol or substance dependence). Talk to your care team about ways to reduce this risk.    All opioids tend to cause constipation. Drink plenty of water and eat foods that have a lot of fiber, such as fruits, vegetables, prune juice, apple juice and high-fiber cereal. Take a laxative (Miralax, milk of magnesia, Colace, Senna) if you don t move your bowels at least every other day. Other side effects include upset stomach, sleepiness, dizziness, throwing up, tolerance (needing more of the medicine to have the same effect), physical dependence and slowed breathing.    Store your pills in a secure place, locked if possible. We will not replace any lost or stolen medicine. If you don t finish your medicine, please throw away (dispose) as directed by your pharmacist. The [place] Pollution Control Agency has more information about safe disposal: https://www.pca.[facility].[place].us/living-green/managing-unwanted-medications         Primary Care Provider Office Phone # Fax #    Jaguar Becker [phone]       [address] [address]        Equal Access to Services     [facility] : Hadii aad ku hadasho Soomaali, waaxda luqadaha, qaybta kaalmada adeegyada, bhargav sage . So [facility] [phone].    ATENCIÓN: Si habla español, tiene a cid disposición servicios gratuitos de asistencia lingüística. Llame al  [phone].    We comply with applicable federal civil rights laws and [place] laws. We do not discriminate on the basis of race, color, national origin, age, disability, sex, sexual orientation, or gender identity.            Thank you!     Thank you for choosing [facility]  for your care. Our goal is always to provide you with excellent care. Hearing back from our patients is one way we can continue to improve our services. Please take a few minutes to complete the written survey that you may receive in the mail after your visit with us. Thank you!             Your Updated Medication List - Protect others around you: Learn how to safely use, store and throw away your medicines at www.disposemymeds.org.          This list is accurate as of 9/7/18 11:59 PM.  Always use your most recent med list.                   Brand Name Dispense Instructions for use Diagnosis    dexamethasone 4 MG tablet    DECADRON    20 tablet    Take 1 tablet (4 mg) by mouth daily    Colon adenocarcinoma (H), Neoplasm related pain       fentaNYL 100 mcg/hr 72 hr patch    DURAGESIC    30 patch    Place 3 patches onto the skin every 72 hours remove old patch.    Colon adenocarcinoma (H), Neoplasm related pain, Peritoneal carcinomatosis (H)       * hydromorphone HCl 500 MG/50ML Soln     25 mL    Dilute 250mg in 250ml NS  0.9mg IV every 10 mins as needed  Dispense 1 bag (250mg = 25ml)    Neoplasm related pain       * hydromorphone HCl 500 MG/50ML Soln     25 mL    Dilute 250mg in 250ml NS  0.9mg IV every 10 mins as needed  Dispense 1 bag (250mg = 25ml)    Neoplasm related pain       * hydromorphone HCl 500 MG/50ML Soln     25 mL    Dilute 250mg in 250ml NS  0.9mg IV every 10 mins as needed  Dispense 1 bag (250mg = 25ml)    Neoplasm related pain       * hydromorphone HCl 500 MG/50ML Soln     25 mL    Dilute 250mg in 250ml NS  0.9mg IV every 10 mins as needed  Dispense 1 bag (250mg = 25ml)    Neoplasm related pain        loperamide 2 MG capsule    IMODIUM     Take 2 mg by mouth as needed for diarrhea        LORazepam 0.5 MG tablet    ATIVAN    30 tablet    Take 1 tablet (0.5 mg) by mouth every 4 hours as needed (Anxiety, Nausea/Vomiting or Sleep)    Peritoneal carcinomatosis (H), Cancer of sigmoid colon (H)       naloxone nasal spray    NARCAN    0.2 mL    Spray 1 spray (4 mg) into one nostril alternating nostrils as needed for opioid reversal every 2-3 minutes until assistance arrives    Cancer of sigmoid colon (H), Long term current use of opiate analgesic       ondansetron 8 MG ODT tab    ZOFRAN-ODT    60 tablet    Take 1 tablet (8 mg) by mouth every 8 hours as needed for nausea    Nausea       order for DME     1 Device    Equipment being ordered: home suction machine with tubing for connection to venting G-tube Treatment Diagnosis: colon adenocarcinoma    Colon adenocarcinoma (H), Small bowel obstruction       pantoprazole 40 MG EC tablet    PROTONIX    30 tablet    Take 1 tablet (40 mg) by mouth every morning (before breakfast)    History of recent steroid use       parenteral nutrition - PTA/DISCHARGE ORDER      Inject into the vein daily [facility]        prochlorperazine 10 MG tablet    COMPAZINE     10 mg every 6 hours as needed        sodium chloride 0.9% Soln BOLUS     1000 mL    Inject 1,000 mLs into the vein daily as needed For hydration.        * Notice:  This list has 4 medication(s) that are the same as other medications prescribed for you. Read the directions carefully, and ask your doctor or other care provider to review them with you.

## 2018-09-07 NOTE — PROGRESS NOTES
This is a recent snapshot of the patient's [facility] medical record.  For current drug dose and complete information and questions, call [phone] or In Basket pool, fv home infusion ([id number])  CSN Number:  [id number]

## 2018-09-10 NOTE — PROGRESS NOTES
This is a recent snapshot of the patient's [facility] medical record.  For current drug dose and complete information and questions, call [phone] or In Basket pool, fv home infusion ([id number])  CSN Number:  [id number]

## 2018-09-12 NOTE — TELEPHONE ENCOUNTER
Received fax from  [facility] requesting refills of of patient's dilaudid. Last refill in [facility] dated for 8/28/201.    Last office visit: 9/5/2018  Scheduled for follow up 10/4/2018     Will route request to MD for review. Approved scripts need to be faxed to home infusion.     [place]ed [place]  Report.

## 2018-09-13 NOTE — TELEPHONE ENCOUNTER
Called Sebas in f/u of last week's visit and labs this week. We had planned to discuss whether he needs to come in for Linda Alves's appointment tomorrow. Labs this week show resolved leukocytosis. Chemistries are stable.    He is a little more tired this week. Illeostomy output is a little more yesterday. Urine is still light in color. Taking in the TPN and also 1.5 liters of saline. Had a little nausea, but not significantly worse than baseline. No change to abdominal pain.  He was planning on coming to tomorrow's appointment as he was feeling he might want to check in before the weekend. I didn't assess anything over the phone that appeared significantly new or worse than baseline, but explained that we were happy to see him to review meds and labs again.  He will keep the appointment for now and if today is a day when he has more energy, will call to cancel the appointment tomorrow. TW

## 2018-09-13 NOTE — LETTER
9/13/2018      RE: Sebas Lopez  [address]  [place] [address]       Called Sebas in f/u of last week's visit and labs this week. We had planned to discuss whether he needs to come in for Linda Alves's appointment tomorrow. Labs this week show resolved leukocytosis. Chemistries are stable.    He is a little more tired this week. Illeostomy output is a little more yesterday. Urine is still light in color. Taking in the TPN and also 1.5 liters of saline. Had a little nausea, but not significantly worse than baseline. No change to abdominal pain.  He was planning on coming to tomorrow's appointment as he was feeling he might want to check in before the weekend. I didn't assess anything over the phone that appeared significantly new or worse than baseline, but explained that we were happy to see him to review meds and labs again.  He will keep the appointment for now and if today is a day when he has more energy, will call to cancel the appointment tomorrow. TW    ALEXA Levy CNP

## 2018-09-14 NOTE — TELEPHONE ENCOUNTER
Received call from Micaela at  [facility] -- they are requesting one more script for hydromorphone so patient has a back up bag on hand. New script needs to be dated for Monday and faxed to  [facility] at fax # [phone].    Will route to MD for review.

## 2018-09-14 NOTE — PROGRESS NOTES
This is a recent snapshot of the patient's [facility] medical record.  For current drug dose and complete information and questions, call [phone] or In [facility] pool, fv home infusion ([id number])  CSN Number:  [id number]

## 2018-09-14 NOTE — PROGRESS NOTES
This is a recent snapshot of the patient's [facility] medical record.  For current drug dose and complete information and questions, call [phone] or In Basket pool, fv home infusion ([id number])  CSN Number:  [id number]

## 2018-09-16 NOTE — PROGRESS NOTES
"SUBJECTIVE/OBJECTIVE:                Sebas Lopez is a 53 year old male called in follow up from our visit on 8/15/18.       Chief Complaint: medication review.    The primary oncologist for this patient is Dr Long.  The PCP for this patient is Dr Jaguar Becker.    Cancer diagnosis: Colon cancer    Allergies/ADRs: None  Tobacco: No tobacco use   Alcohol: none    PMH: Reviewed in Epic    Medication Adherence/Access:  Patient uses pill box(es), and medication chart.  Per patient, misses medication 0 times per week.     Of note, patient has a venting G-tubei; he is able to clamp it for at least 1-2 hours after oral medications.    He is also on TPN.    Colon cancer:  Current medications include: chemotherapy with irinotecan and panitumumab; [id number] was 9/7/18.  Patient is taking PRN loperamide 2 caps BID (plus PRN) for diarrhea from the irinotecan.        Cancer related pain:  Current medications include: dexamethasone 4mg PO daily (no SE reported), fentanyl patch 300mcg/hr Q72 hours (as 8j362pcy), plus IV PCA with hydromorphone.  Patient reports \"pretty good\" pain control.  He has naloxone nasal spray for rescue at home, but has not ever needed it.    Nausea/vomiting:  Current medications include: PRN lorazepam (not used lately), PRN prochlorperazine (not needed), and PRN ondansetron ODT (now using 2-3 times per day recently).  The ondansetron is working well for him.    GERD: Current medications include: Protonix (pantoprazole) 40mg daily.  Pt c/o no current symptoms.  Patient feels that current regimen is effective.    Latest Ht and Wt:  Wt Readings from Last 2 Encounters:   09/07/18 141 lb 4.8 oz (64.1 kg)   09/05/18 143 lb 4.8 oz (65 kg)     Ht Readings from Last 2 Encounters:   09/07/18 5' 9.02\" (1.753 m)   08/24/18 5' 9.02\" (1.753 m)        Latest vitals:  There were no vitals taken for this visit. (phone visit)    BP Readings from Last 3 Encounters:   09/07/18 118/75   09/05/18 119/67   09/02/18 127/88 "       Current labs include:  Lab Results   Component Value Date    BUN 33 09/07/2018     Lab Results   Component Value Date    CR 0.76 09/07/2018     Lab Results   Component Value Date    POTASSIUM 4.6 09/07/2018     Lab Results   Component Value Date    ALT 55 09/07/2018     Lab Results   Component Value Date    AST 26 09/07/2018     Lab Results   Component Value Date    BILITOTAL 0.2 09/07/2018     Lab Results   Component Value Date    ALBUMIN 2.8 09/07/2018     Lab Results   Component Value Date    WBC 13.5 09/07/2018     Lab Results   Component Value Date    HGB 9.9 09/07/2018     Lab Results   Component Value Date     09/07/2018     Absolute Neutrophil   Date Value Ref Range Status   09/07/2018 10.0 (H) 1.6 - 8.3 10e9/L Final         ASSESSMENT:                 Current medications were reviewed today.      Medication Adherence: good, no issues identified    Colon cancer: Stable. No change at this time.    Cancer related pain: Needs Improvement. Patient would benefit from clamping Gtube for 1-2 hours after dexamethasone administration.    Nausea/vomiting: Stable. Current therapy seems effective.    GERD: Stable.  Current treatment is effective.  I advised patient to clamp the Gtube for 1-2 hours after pantoprazole administration.      PLAN:                  No medication changes today.      I spent 30 minutes with this patient today. A copy of the visit note was provided to the patient's primary care and referring provider.    Will follow up in about 4 -8 weeks.    The patient was sent via [employer] a summary of these recommendations as an after visit summary.    Sherry Coronel, PharmD, BCOP, BCPS  Clinical Pharmacy Specialist/  Oncology Medication Therapy Management Pharmacist  [facility]  Pager [phone]  Phone [phone]

## 2018-09-16 NOTE — PATIENT INSTRUCTIONS
Recommendations from today's MTM visit:                                                    MTM (medication therapy management) is a service provided by a clinical pharmacist designed to help you get the most of out of your medicines.   Today we reviewed what your medicines are for, how to know if they are working, that your medicines are safe and how to make your medicine regimen as easy as possible.     1. No medication changes.    Next MTM visit:  By PHONE 10/31/18    To schedule another MTM appointment, please call the clinic directly or you may call the MTM scheduling line at [phone] or toll-free at [phone].     My Clinical Pharmacist's contact information:                                                      It was a pleasure seeing you today!  Please feel free to contact me with any questions or concerns you have.      Sherry Coronel, PharmD, BCOP, BCPS  Clinical Pharmacy Specialist/  Oncology Medication Therapy Management Pharmacist  [facility]  Pager [phone]  Phone [phone]        You may receive a survey about the MTM services you received.  I would appreciate your feedback to help me serve you better in the future. Please fill it out and return it when you can. Your comments will be anonymous.

## 2018-09-21 NOTE — PROGRESS NOTES
This is a recent snapshot of the patient's [facility] medical record.  For current drug dose and complete information and questions, call [phone] or In Basket pool, fv home infusion ([id number])  CSN Number:  [id number]

## 2018-09-21 NOTE — PATIENT INSTRUCTIONS
Contact Numbers  [facility]: [phone]    After Hours:  [phone]  Triage: [phone]    Please call the [facility] Triage line if you experience a temperature greater than or equal to 100.5, shaking chills, have uncontrolled nausea, vomiting and/or diarrhea, dizziness, shortness of breath, chest pain, bleeding, unexplained bruising, or if you have any other new/concerning symptoms, questions or concerns.     If it is after hours, weekends, or holidays, please call the main hospital  at  [phone] and ask to speak to the Oncology doctor on call.     If you are having any concerning symptoms or wish to speak to a provider before your next infusion visit, please call your care coordinator or triage to notify them so we can adequately serve you.     If you need a refill on a narcotic prescription or other medication, please call triage before your infusion appointment.           September 2018 Sunday Monday Tuesday Wednesday Thursday Friday Saturday                                 1       2     [facility]    9:00 AM   (15 min.)    [facility]   [facility] Lab Draw     [facility] ONC INFUSION 120    9:30 AM   (120 min.)    ONCOLOGY INFUSION   formerly [facility] 3     4     5     [facility]    1:15 PM   (15 min.)    [facility]   [facility] Lab Draw     [facility] RETURN    1:45 PM   (45 min.)   Lore Calix MD   [facility] BMT INFUSION 120    3:00 PM   (120 min.)    BMT INFUSION   [facility] 6     7     [facility]    6:45 AM   (15 min.)    [facility]   [facility] Draw     [facility] RETURN    6:45 AM   (50 min.)   Adrianna Pearson APRN CNP   [facility] ONC INFUSION 240    8:00 AM   (240 min.)    ONCOLOGY INFUSION   formerly [facility] 8       9     10     11     12     TELEMEDICINE    8:00 AM   (30 min.)   Sherry Coronel  Myrna [facility] 13     14     15       16     17     18     19     20     21     [facility]   10:45 AM   (15 min.)   [facility]   [facility] Lab Draw     [facility] RETURN   10:55 AM   (50 min.)   Linda Alves PA-C   [facility]     [facility] ONC INFUSION 240   12:30 PM   (240 min.)   [facility] ONCOLOGY INFUSION   [facility] 22       23     24     25     26     27     28     29       30                                              October 2018 Sunday Monday Tuesday Wednesday Thursday Friday Saturday        1     2     3     4     [facility]   10:00 AM   (15 min.)   [facility]   [facility] Lab Draw     CT CHEST/ABDOMEN/PELVIS W   10:40 AM   (20 min.)   UC[facility]2   [facility]     [facility] RETURN   12:45 PM   (30 min.)   Albert Long MD   [facility]     [facility] ONC INFUSION 240    1:30 PM   (240 min.)   [facility] ONCOLOGY INFUSION   [facility]     [facility] RETURN    1:45 PM   (30 min.)   Lydia Beckham MD   [facility] 5     6       7     8     9     10     11     12     13       14     15     16     17     18     19     20       21     22     23     24     25     26     27       28     29     30     31     TELEMEDICINE   10:30 AM   (30 min.)   Sherry Coronel [facility]                             Lab Results:  Recent Results (from the past 12 hour(s))   CBC with platelets differential    Collection Time: 09/21/18 11:09 AM   Result Value Ref Range    WBC 11.0 4.0 - 11.0 10e9/L    RBC Count 3.11 (L) 4.4 - 5.9 10e12/L    Hemoglobin 8.3 (L) 13.3 - 17.7 g/dL    Hematocrit 25.8 (L) 40.0 - 53.0 %    MCV 83 78 - 100 fl    MCH 26.7 26.5 - 33.0 pg    MCHC 32.2 31.5 - 36.5 g/dL    RDW 18.6 (H) 10.0 - 15.0 %    Platelet Count 407 150 - 450 10e9/L    Diff Method Manual Differential     % Neutrophils 62.8 %    % Lymphocytes 17.7  %    % Monocytes 6.2 %    % Eosinophils 0.9 %    % Basophils 0.0 %    % Metamyelocytes 7.1 %    % Myelocytes 3.5 %    % Promyelocytes 1.8 %    Nucleated RBCs 1 (H) 0 /100    Absolute Neutrophil 6.9 1.6 - 8.3 10e9/L    Absolute Lymphocytes 1.9 0.8 - 5.3 10e9/L    Absolute Monocytes 0.7 0.0 - 1.3 10e9/L    Absolute Eosinophils 0.1 0.0 - 0.7 10e9/L    Absolute Basophils 0.0 0.0 - 0.2 10e9/L    Absolute Metamyelocytes 0.8 (H) 0 10e9/L    Absolute Myelocytes 0.4 (H) 0 10e9/L    Absolute Promyeloctyes 0.2 (H) 0 10e9/L    Absolute Nucleated RBC 0.1     Anisocytosis Moderate     Poikilocytosis Slight     Platelet Estimate Confirming automated cell count    Comprehensive metabolic panel    Collection Time: 09/21/18 11:09 AM   Result Value Ref Range    Sodium 129 (L) 133 - 144 mmol/L    Potassium 3.6 3.4 - 5.3 mmol/L    Chloride 97 94 - 109 mmol/L    Carbon Dioxide 23 20 - 32 mmol/L    Anion Gap 9 3 - 14 mmol/L    Glucose 115 (H) 70 - 99 mg/dL    Urea Nitrogen 18 7 - 30 mg/dL    Creatinine 0.60 (L) 0.66 - 1.25 mg/dL    GFR Estimate >90 >60 mL/min/1.7m2    GFR Estimate If Black >90 >60 mL/min/1.7m2    Calcium 8.2 (L) 8.5 - 10.1 mg/dL    Bilirubin Total 0.3 0.2 - 1.3 mg/dL    Albumin 1.8 (L) 3.4 - 5.0 g/dL    Protein Total 6.4 (L) 6.8 - 8.8 g/dL    Alkaline Phosphatase 131 40 - 150 U/L    ALT 22 0 - 70 U/L    AST 21 0 - 45 U/L   Magnesium    Collection Time: 09/21/18 11:09 AM   Result Value Ref Range    Magnesium 1.9 1.6 - 2.3 mg/dL

## 2018-09-21 NOTE — LETTER
9/21/2018    RE: Sebas Lopez  [address] [place] [address]     Oncology/Hematology Visit Note  Sep 21, 2018    Reason for Visit: seen in f/u of metastatic colon cancer    Oncology HPI: Sebas Lopez is a 53 year old man with a PMH of ulcerative colitis. He presented in May 2017 with an obstructing sigmoid mass, biopsied positive for adenocarcinoma. He developed obstructive symptoms and was found to have peritoneal carcinomatosis at the time of exploratory surgery. He underwent lysis of adhesions and small bowel resection with end ileostomy. He was started was on  FOLFOX from August 2017-June 2018. He was considered for HIPEC in Dec 2017, but at the time of exploratory laparotomy, his peritoenal disease was too extensive. His course was complicated with colonic obstruction for which he was hospitalized in January 2018. He underwent colon stenting He also had complicating bacteremia in Feb 2018, an infected port was removed. He was treated with IV antibiotics via PICC. In May 2018, he was found to have improved disease and the 5FU bolus was discontinued. In July 2018, he was found to have worsening peritoneal disease and colitis. A 3rd colon stent was placed. He was readmitted with SBO and PAULINA and a venting G tube was placed.   He initiated Irinotecan and Panitumumab on 8/17/18. His course has been complicated thus far with dehydration.  He is here today for routine follow up prior to cycle 3.     Interval history:   Patient reports that he is having some cracking of his fingertips.  He reports that he is having no issues with the skin on his feet.  He he does have some stable abdominal pain, managed with fentanyl and uses his Dilaudid PCA up to 10 times per day.  He has a venting G-tube that is open about 1 hour per day.  He usually gets out about 500 mL at a time.  He remains on TPN at 12 hours per day.  He is using about 1.5 L of IV normal saline per day.  He is drinking some Gatorade, pop, and cam  boot, for a total of about 1/2 gallon per day.  He typically empties his ostomy bag about 3-4 times a day with liquid consistency.  He has noticed hair loss.  He is taking his Zofran twice a day for nausea.  He denies any acid reflux.  He is rarely taking Ativan or Compazine.  He is taking 4 mg of Imodium 2-3 times a day.  He denies other concerns.    Current Outpatient Prescriptions   Medication Sig Dispense Refill     dexamethasone (DECADRON) 1 MG tablet Take 2 tablets (2 mg) by mouth daily for 7 days Then 1 mg daily x 7 days, then stop. 21 tablet 0     fentaNYL (DURAGESIC) 100 mcg/hr 72 hr patch Place 3 patches onto the skin every 72 hours remove old patch. 30 patch 0     hydromorphone HCl 500 MG/50ML SOLN Dilute 250mg in 250ml NS    0.9mg IV every 10 mins as needed    Dispense 1 bag (250mg = 25ml) 25 mL 0     [START ON 10/4/2018] hydromorphone HCl 500 MG/50ML SOLN Dilute 250mg in 250ml NS    0.9mg IV every 10 mins as needed    Dispense 1 bag (250mg = 25ml) 25 mL 0     [START ON 9/27/2018] hydromorphone HCl 500 MG/50ML SOLN Dilute 250mg in 250ml NS    0.9mg IV every 10 mins as needed    Dispense 1 bag (250mg = 25ml) 25 mL 0     hydromorphone HCl 500 MG/50ML SOLN Dilute 250mg in 250ml NS    0.9mg IV every 10 mins as needed    Dispense 1 bag (250mg = 25ml) 25 mL 0     loperamide (IMODIUM) 2 MG capsule Take 2 mg by mouth as needed for diarrhea       LORazepam (ATIVAN) 0.5 MG tablet Take 1 tablet (0.5 mg) by mouth every 4 hours as needed (Anxiety, Nausea/Vomiting or Sleep) 30 tablet 5     naloxone (NARCAN) nasal spray Spray 1 spray (4 mg) into one nostril alternating nostrils as needed for opioid reversal every 2-3 minutes until assistance arrives 0.2 mL 0     ondansetron (ZOFRAN-ODT) 8 MG ODT tab Take 1 tablet (8 mg) by mouth every 8 hours as needed for nausea 60 tablet 5     order for DME Equipment being ordered: home suction machine with tubing for connection to venting G-tube  Treatment Diagnosis: colon  "adenocarcinoma 1 Device 0     pantoprazole (PROTONIX) 40 MG EC tablet Take 1 tablet (40 mg) by mouth every morning (before breakfast) 30 tablet 0     parenteral nutrition - PTA/DISCHARGE ORDER Inject into the vein daily [facility]       prochlorperazine (COMPAZINE) 10 MG tablet 10 mg every 6 hours as needed        sodium chloride 0.9% SOLN BOLUS Inject 1,000 mLs into the vein daily as needed For hydration. 1000 mL 0       Allergies   Allergen Reactions     Liquid Adhesive Rash     Dermabond, rash with pustules, occurred with abdominal surgery and port removal      Exam:   General: The patient is a pleasant male in no acute distress. He appears chronically ill.  /64 (BP Location: Left arm, Patient Position: Sitting, Cuff Size: Adult Regular)  Pulse 104  Temp 99.3  F (37.4  C) (Oral)  Resp 20  Ht 1.753 m (5' 9.02\")  Wt 65.1 kg (143 lb 9.6 oz)  SpO2 98%  BMI 21.2 kg/m2  Wt Readings from Last 10 Encounters:   09/21/18 65.1 kg (143 lb 9.6 oz)   09/07/18 64.1 kg (141 lb 4.8 oz)   09/05/18 65 kg (143 lb 4.8 oz)   09/02/18 65.4 kg (144 lb 3.2 oz)   08/30/18 63.2 kg (139 lb 4.8 oz)   08/24/18 68.2 kg (150 lb 6.4 oz)   08/17/18 70.1 kg (154 lb 8.7 oz)   08/09/18 71.7 kg (158 lb)   08/09/18 71.8 kg (158 lb 6.4 oz)   08/06/18 71.5 kg (157 lb 10.1 oz)   HEENT: EOMI, PERRL. Sclerae are anicteric. Oral mucosa is pink and moist with no lesions or thrush.   Lymph: Neck is supple with no lymphadenopathy in the cervical or supraclavicular areas.   Heart: Regular rate and rhythm.   Lungs: Clear to auscultation bilaterally.   Abdomen: Bowel sounds present, mildly distended, ostomy bag in place with liquid brown stool, venting G-tube in minimal with minimal surrounding drainage and no erythema, abdomen is firm and mildly tender throughout, left greater than right.   Extremities: No lower extremity edema noted bilaterally.   Neuro: Cranial nerves II through XII are grossly intact.  Skin: Dry and cracked skin noted on fingers. No " erythema.     Labs:    9/21/2018 11:09   Sodium 129 (L)   Potassium 3.6   Chloride 97   Carbon Dioxide 23   Urea Nitrogen 18   Creatinine 0.60 (L)   GFR Estimate >90   GFR Estimate If Black >90   Calcium 8.2 (L)   Anion Gap 9   Magnesium 1.9   Albumin 1.8 (L)   Protein Total 6.4 (L)   Bilirubin Total 0.3   Alkaline Phosphatase 131   ALT 22   AST 21   Glucose 115 (H)   WBC 11.0   Hemoglobin 8.3 (L)   Hematocrit 25.8 (L)   Platelet Count 407   RBC Count 3.11 (L)   MCV 83   MCH 26.7   MCHC 32.2   RDW 18.6 (H)   Diff Method Manual Differential   % Neutrophils 62.8   % Lymphocytes 17.7   % Monocytes 6.2   % Eosinophils 0.9   % Basophils 0.0   % Metamyelocytes 7.1   % Myelocytes 3.5   % Promyelocytes 1.8   Nucleated RBCs 1 (H)   Absolute Neutrophil 6.9   Absolute Lymphocytes 1.9   Absolute Monocytes 0.7   Absolute Eosinophils 0.1   Absolute Basophils 0.0   Absolute Metamyelocytes 0.8 (H)   Absolute Myelocytes 0.4 (H)   Absolute Promyelocytes 0.2 (H)   Absolute Nucleated RBC 0.1   Anisocytosis Moderate   Poikilocytosis Slight   Platelet Estimate Confirming automa...     Impression/plan:   1. Stage IV colon cancer with peritoneal carcinomatosis and an obstructing sigmoid colon mass  -progressed on FOLFOX, now on Irinotecan/Panitumumab since 8/17/18  -He feels this treatment has been fairly well tolerated. He will continue with cycle 3 today.  -He will see Dr. Long in 2 weeks with a CT CAP prior to consideration of additional treatment.     2. Chronic bowel obstruction s/p placement of venting G tube, on TPN  -sx are managed well with venting tube, currently open 1 hour/day.  -pain is well controlled with fentanyl patches and hydromorphone pca. Follows with palliative care.     3. FEN: maintaining with NS 1.5 L and TPN 12 hours daily  -Cr is normal today. Appears to be getting in adequate hydration.   -Albumin has declined. If continues to decline, may need to consider additional protein in his TPN. Will review with  home  infusion.   -Sodium remains mildly low. He will continue with 1.5L IV NS daily.    4. Derm:  -Recommend urea based cream bid to hands to help with dry skin. Possibly related to panitumumab.    5. Heme:  -Anemia: Most likely anemia of chronic disease with decline due to chemotherapy. Has a history of iron deficient anemia, but recent evaluation in August is consistent with anemia of chronic disease. Discussed coming in next week for labs and possible transfusions versus calling if develops symptoms of anemia. Patient prefers to call. Would consider transfusion if hemoglobin less than or = 8.    Linda Alves PA-C  [facility]  [address], [place] [id number] [phone]

## 2018-09-21 NOTE — MR AVS SNAPSHOT
After Visit Summary   9/21/2018    Sebas Lopez    MRN: [id number]           Patient Information     Date Of Birth          1965        Visit Information        Provider Department      9/21/2018 11:10 AM Linda Alves PA-C [facility]        Today's Diagnoses     Colon adenocarcinoma (H)    -  1    Neoplasm related pain        Cancer of sigmoid colon (H)        Peritoneal carcinomatosis (H)           Follow-ups after your visit        Your next 10 appointments already scheduled     Oct 04, 2018 10:00 AM CDT   [facility] with  [facility]   [facility] ([facility])    [address]  [address]  [facility] [address]   [phone]            Oct 04, 2018 10:40 AM CDT   CT CHEST/ABDOMEN/PELVIS W CONTRAST with UCCT2   [facility] CT ([facility])    [address]  1st Floor  [facility] [address] [phone]           How do I prepare for my exam? (Food and drink instructions) To prepare: Do not eat or drink for 2 hours before your exam. If you need to take medicine, you may take it with small sips of water. (We may ask you to take liquid medicine as well.)  How do I prepare for my exam? (Other instructions) Please arrive 30 minutes early for your CT.  Once in the department you might be asked to drink water 15-20 minutes prior to your exam.  If indicated you may be asked to drink an oral contrast in advance of your CT.  If this is the case, the imaging team will let you know or be in contact with you prior to your appointment  Patients over 70 or patients with diabetes or kidney problems: If you haven t had a blood test (creatinine test) within the last 30 days, the Cardiologist/Radiologist may require you to get this test prior to your exam.  If you have diabetes:  Continue to take your metformin medication on the day of your exam  What should I wear:  Please wear loose clothing, such as a sweat suit or jogging clothes. Avoid snaps, zippers and other metal. We may ask you to undress and put on a hospital gown.  How long does the exam take: Most scans take less than 20 minutes.  What should I bring: Please bring any scans or X-rays taken at other hospitals, if similar tests were done. Also bring a list of your medicines, including vitamins, minerals and over-the-counter drugs. It is safest to leave personal items at home.  Do I need a : No  is needed.  What do I need to tell my doctor? Be sure to tell your doctor: * If you have any allergies. * If there s any chance you are pregnant. * If you are breastfeeding.  What should I do after the exam: No restrictions, You may resume normal activities.  What is this test: A CT (computed tomography) scan is a series of pictures that allows us to look inside your body. The scanner creates images of the body in cross sections, much like slices of bread. This helps us see any problems more clearly. You may receive contrast (X-ray dye) before or during your scan. You will be asked to drink the contrast.  Who should I call with questions: If you have any questions, please call the Imaging Department where you will have your exam. Directions, parking instructions, and other information is available on our website, [email or web address].            Oct 04, 2018  1:00 PM CDT   (Arrive by 12:45 PM)   Return Visit with Albert Long MD   [facility])    [address] [place] [address]  [address]  [facility] [address]   [phone]            Oct 04, 2018  1:30 PM CDT   Infusion 240 with UC ONCOLOGY INFUSION, UC 32 ATC   [facility])    [address] [place] [address]  [address]  [facility] [address]   [phone]            Oct 04, 2018  2:00 PM CDT   (Arrive by 1:45 PM)   Return Visit with Lydia Beckham MD     "[facility] ([facility])    [address]  [address]  [facility] [id number] [phone]            Oct 31, 2018 10:30 AM CDT   TELEMEDICINE with Sherry Coronel RPH   [facility] ([facility])    [address]  [address]  [facility] [id number] [phone]           Note: this is not an onsite visit; there is no need to come to the facility.              Who to contact     If you have questions or need follow up information about today's clinic visit or your schedule please contact [facility] directly at [phone].  Normal or non-critical lab and imaging results will be communicated to you by [employer], letter or phone within 4 business days after the clinic has received the results. If you do not hear from us within 7 days, please contact the clinic through [employer] or phone. If you have a critical or abnormal lab result, we will notify you by phone as soon as possible.  Submit refill requests through [employer] or call your pharmacy and they will forward the refill request to us. Please allow 3 business days for your refill to be completed.          Additional Information About Your Visit        [employer] Information     [employer] gives you secure access to your electronic health record. If you see a primary care provider, you can also send messages to your care team and make appointments. If you have questions, please call your primary care clinic.  If you do not have a primary care provider, please call [phone] and they will assist you.        Care EveryWhere ID     This is your Care EveryWhere ID. This could be used by other organizations to access your [place] medical records  [id number]        Your Vitals Were     Pulse Temperature Respirations Height Pulse Oximetry BMI (Body Mass Index)    104 99.3  F (37.4  C) (Oral) 20 1.753 m (5' 9.02\") 98% 21.2 kg/m2       Blood " Pressure from Last 3 Encounters:   09/21/18 105/64   09/07/18 118/75   09/05/18 119/67    Weight from Last 3 Encounters:   09/21/18 65.1 kg (143 lb 9.6 oz)   09/07/18 64.1 kg (141 lb 4.8 oz)   09/05/18 65 kg (143 lb 4.8 oz)              Today, you had the following     No orders found for display         Today's Medication Changes          These changes are accurate as of 9/21/18 11:59 PM.  If you have any questions, ask your nurse or doctor.               These medicines have changed or have updated prescriptions.        Dose/Directions    dexamethasone 1 MG tablet   Commonly known as:  DECADRON   This may have changed:    - medication strength  - how much to take  - additional instructions   Used for:  Colon adenocarcinoma (H), Neoplasm related pain   Changed by:  Linda Alves PA-C        Dose:  2 mg   Take 2 tablets (2 mg) by mouth daily for 7 days Then 1 mg daily x 7 days, then stop.   Quantity:  21 tablet   Refills:  0            Where to get your medicines      These medications were sent to [facility] - [address] [address]     Phone:  [phone]     dexamethasone 1 MG tablet                Primary Care Provider Office Phone # Fax #    Jaguar Becker -[phone]       [facility] [address]        Equal Access to Services     [facility] AH: Hadii karrie Funk, waaxda luqadaha, qaybta kaalmabhargav thorpe . So [facility] [phone].    ATENCIÓN: Si habla español, tiene a cid disposición servicios gratuitos de asistencia lingüística. Corinaame al [phone].    We comply with applicable federal civil rights laws and [place] laws. We do not discriminate on the basis of race, color, national origin, age, disability, sex, sexual orientation, or gender identity.            Thank you!     Thank you for choosing [facility]  for your care. Our goal is always to provide you with excellent care. Hearing back from our patients is one way we can continue to improve our services. Please take a few minutes to complete the written survey that you may receive in the mail after your visit with us. Thank you!             Your Updated Medication List - Protect others around you: Learn how to safely use, store and throw away your medicines at www.disposemymeds.org.          This list is accurate as of 9/21/18 11:59 PM.  Always use your most recent med list.                   Brand Name Dispense Instructions for use Diagnosis    dexamethasone 1 MG tablet    DECADRON    21 tablet    Take 2 tablets (2 mg) by mouth daily for 7 days Then 1 mg daily x 7 days, then stop.    Colon adenocarcinoma (H), Neoplasm related pain       fentaNYL 100 mcg/hr 72 hr patch    DURAGESIC    30 patch    Place 3 patches onto the skin every 72 hours remove old patch.    Colon adenocarcinoma (H), Neoplasm related pain, Peritoneal carcinomatosis (H)       * hydromorphone HCl 500 MG/50ML Soln     25 mL    Dilute 250mg in 250ml NS  0.9mg IV every 10 mins as needed  Dispense 1 bag (250mg = 25ml)    Neoplasm related pain       * hydromorphone HCl 500 MG/50ML Soln     25 mL    Dilute 250mg in 250ml NS  0.9mg IV every 10 mins as needed  Dispense 1 bag (250mg = 25ml)    Neoplasm related pain       * hydromorphone HCl 500 MG/50ML Soln   Start taking on:  9/27/2018     25 mL    Dilute 250mg in 250ml NS  0.9mg IV every 10 mins as needed  Dispense 1 bag (250mg = 25ml)    Neoplasm related pain       * hydromorphone HCl 500 MG/50ML Soln   Start taking on:  10/4/2018     25 mL    Dilute 250mg in 250ml NS  0.9mg IV every 10 mins as needed  Dispense 1 bag (250mg = 25ml)    Neoplasm related pain       loperamide 2 MG capsule    IMODIUM     Take 2 mg by mouth as needed for diarrhea        LORazepam 0.5 MG tablet    ATIVAN    30 tablet    Take 1 tablet (0.5 mg) by mouth every 4  hours as needed (Anxiety, Nausea/Vomiting or Sleep)    Peritoneal carcinomatosis (H), Cancer of sigmoid colon (H)       naloxone nasal spray    NARCAN    0.2 mL    Spray 1 spray (4 mg) into one nostril alternating nostrils as needed for opioid reversal every 2-3 minutes until assistance arrives    Cancer of sigmoid colon (H), Long term current use of opiate analgesic       ondansetron 8 MG ODT tab    ZOFRAN-ODT    60 tablet    Take 1 tablet (8 mg) by mouth every 8 hours as needed for nausea    Nausea       order for DME     1 Device    Equipment being ordered: home suction machine with tubing for connection to venting G-tube Treatment Diagnosis: colon adenocarcinoma    Colon adenocarcinoma (H), Small bowel obstruction       pantoprazole 40 MG EC tablet    PROTONIX    30 tablet    Take 1 tablet (40 mg) by mouth every morning (before breakfast)    History of recent steroid use       parenteral nutrition - PTA/DISCHARGE ORDER      Inject into the vein daily [facility]        prochlorperazine 10 MG tablet    COMPAZINE     10 mg every 6 hours as needed        sodium chloride 0.9% Soln BOLUS     1000 mL    Inject 1,000 mLs into the vein daily as needed For hydration.        * Notice:  This list has 4 medication(s) that are the same as other medications prescribed for you. Read the directions carefully, and ask your doctor or other care provider to review them with you.

## 2018-09-21 NOTE — Clinical Note
9/21/2018       RE: Sebas Lopez  [address] [place] [address]     Dear Colleague,    Thank you for referring your patient, Sebas Lopez, to the [facility]. Please see a copy of my visit note below.    Oncology/Hematology Visit Note  Sep 21, 2018    Reason for Visit: seen in f/u of metastatic colon cancer    Oncology HPI: Sebas Lopez is a 53 year old man with a PMH of ulcerative colitis. He presented in May 2017 with an obstructing sigmoid mass, biopsied positive for adenocarcinoma. He developed obstructive symptoms and was found to have peritoneal carcinomatosis at the time of exploratory surgery. He underwent lysis of adhesions and small bowel resection with end ileostomy. He was started was on  FOLFOX from August 2017-June 2018. He was considered for HIPEC in Dec 2017, but at the time of exploratory laparotomy, his peritoenal disease was too extensive. His course was complicated with colonic obstruction for which he was hospitalized in January 2018. He underwent colon stenting He also had complicating bacteremia in Feb 2018, an infected port was removed. He was treated with IV antibiotics via PICC. In May 2018, he was found to have improved disease and the 5FU bolus was discontinued. In July 2018, he was found to have worsening peritoneal disease and colitis. A 3rd colon stent was placed. He was readmitted with SBO and PAULINA and a venting G tube was placed.   He initiated Irinotecan and Panitumumab on 8/17/18. His course has been complicated thus far with dehydration.    Interval history:       Current Outpatient Prescriptions   Medication Sig Dispense Refill     dexamethasone (DECADRON) 4 MG tablet Take 1 tablet (4 mg) by mouth daily 20 tablet 0     fentaNYL (DURAGESIC) 100 mcg/hr 72 hr patch Place 3 patches onto the skin every 72 hours remove old patch. 30 patch 0     hydromorphone HCl 500 MG/50ML SOLN Dilute 250mg in 250ml NS    0.9mg IV every 10 mins as  needed    Dispense 1 bag (250mg = 25ml) 25 mL 0     [START ON 10/4/2018] hydromorphone HCl 500 MG/50ML SOLN Dilute 250mg in 250ml NS    0.9mg IV every 10 mins as needed    Dispense 1 bag (250mg = 25ml) 25 mL 0     [START ON 9/27/2018] hydromorphone HCl 500 MG/50ML SOLN Dilute 250mg in 250ml NS    0.9mg IV every 10 mins as needed    Dispense 1 bag (250mg = 25ml) 25 mL 0     hydromorphone HCl 500 MG/50ML SOLN Dilute 250mg in 250ml NS    0.9mg IV every 10 mins as needed    Dispense 1 bag (250mg = 25ml) 25 mL 0     loperamide (IMODIUM) 2 MG capsule Take 2 mg by mouth as needed for diarrhea       LORazepam (ATIVAN) 0.5 MG tablet Take 1 tablet (0.5 mg) by mouth every 4 hours as needed (Anxiety, Nausea/Vomiting or Sleep) 30 tablet 5     naloxone (NARCAN) nasal spray Spray 1 spray (4 mg) into one nostril alternating nostrils as needed for opioid reversal every 2-3 minutes until assistance arrives 0.2 mL 0     ondansetron (ZOFRAN-ODT) 8 MG ODT tab Take 1 tablet (8 mg) by mouth every 8 hours as needed for nausea 60 tablet 5     order for DME Equipment being ordered: home suction machine with tubing for connection to venting G-tube  Treatment Diagnosis: colon adenocarcinoma 1 Device 0     pantoprazole (PROTONIX) 40 MG EC tablet Take 1 tablet (40 mg) by mouth every morning (before breakfast) 30 tablet 0     parenteral nutrition - PTA/DISCHARGE ORDER Inject into the vein daily [facility]       prochlorperazine (COMPAZINE) 10 MG tablet 10 mg every 6 hours as needed        sodium chloride 0.9% SOLN BOLUS Inject 1,000 mLs into the vein daily as needed For hydration. 1000 mL 0          Allergies   Allergen Reactions     Liquid Adhesive Rash     Dermabond, rash with pustules, occurred with abdominal surgery and port removal          Exam: alert, appears chronically ill. There were no vitals taken for this visit.  Wt Readings from Last 4 Encounters:   09/07/18 64.1 kg (141 lb 4.8 oz)   09/05/18 65 kg (143 lb 4.8 oz)   09/02/18 65.4 kg (144  lb 3.2 oz)   08/30/18 63.2 kg (139 lb 4.8 oz)     Oropharynx is moist, no focal lesion. No icterus. Neck supple and without adenopathy. Lungs:CTA. Heart:RRR, no murmur or rub. Abodomen: distended, mildly tender in the LLQ, ileostomy with liquid stool, G tube site without erythema/tenderness. Extremities: warm, no edema. PICC in the right arm without erythema or drainage at the insertion site.    Labs:     Impression/plan:   1. Stage IV colon cancer with peritoneal carcinomatosis and an obstructing sigmoid colon mass  -progressed on FOLFOX, now on Irinotecan/Panitumumab since 8/17/18  -he feels this treatment has been fairly well tolerated. Reviewed hydration concerns with diarrhea. Need to monitor for s/s of dehydration  -wbc elevated ,but no s/s of infection today. OK to proceed with chemotherapy.  -reviewed f/u plan. Has f/u with Linda Alves next week. They would like to consider cancelling. Explained that we could touch base over the phone next week and assess his labs/hydration before cancelling appointment. Will arrange f/u with Dr. Long with restaging CT after this cycle    2. Chronic bowel obstruction s/p placement of venting G tube, on TPN  -sx are managed well with venting tube currently.  -pain is well controlled with fentanyl patches and hydromorphone pca. Refill of fentanyl patchs given today    3. FEN: maintaining with NS 1.5 L and TPN 2.6 L IV daily  -difficult to assess hydration status as he isn't tracking output carefully, but urine output appears wnl and Cr stable  -lytes stable at this time    4. Leukocytosis-  -no s/s of infection. Given hx of bacteremia, check blood culture from PICC x 2 lumens and peripheral  -recheck labs on Tues through [facility]    Linda Alves PA-C  [facility] [id number] [place] [address] [phone]      Again, thank you for allowing me to participate in the care of your patient.      Sincerely,    Linda Alves PA-C

## 2018-09-21 NOTE — NURSING NOTE
"Oncology Rooming Note    September 21, 2018 11:33 AM   Sebas Lopez is a 53 year old male who presents for:    Chief Complaint   Patient presents with     Blood Draw     Right DL PICC - labs drawn from white line - flushed with saline only, Purple line with infusion going, vitals completed, checked into next appointment.     Oncology Clinic Visit     Return : Colon Cancer     Initial Vitals: /64 (BP Location: Left arm, Patient Position: Sitting, Cuff Size: Adult Regular)  Pulse 104  Temp 99.3  F (37.4  C) (Oral)  Resp 20  Ht 1.753 m (5' 9.02\")  Wt 65.1 kg (143 lb 9.6 oz)  SpO2 98%  BMI 21.2 kg/m2 Estimated body mass index is 21.2 kg/(m^2) as calculated from the following:    Height as of this encounter: 1.753 m (5' 9.02\").    Weight as of this encounter: 65.1 kg (143 lb 9.6 oz). Body surface area is 1.78 meters squared.  Moderate Pain (4) Comment: Data Unavailable   No LMP for male patient.  Allergies reviewed: Yes  Medications reviewed: Yes    Medications: Patient states that he doesn't know if he needs a refill on his Dexamethasone or not; he does if he needs to continue taking it.  Pharmacy name entered into [employer]:    [facility] - [place] - [place], [place] - [address] [place] [address] HOME INFUSION    Clinical concerns: Patient states he has had some blisters on his finger tips that started when he started chemo.     8 minutes for nursing intake (face to face time)     Nohemi Brown CMA              "

## 2018-09-21 NOTE — NURSING NOTE
Chief Complaint   Patient presents with     Blood Draw     Right DL PICC - labs drawn from white line - flushed with saline only, Purple line with infusion going, vitals completed, checked into next appointment.   Melissa Paulino,RN

## 2018-09-21 NOTE — MR AVS SNAPSHOT
After Visit Summary   9/21/2018    Sebas Lopez    MRN: [id number]           Patient Information     Date Of Birth          1965        Visit Information        Provider Department      9/21/2018 12:30 PM UC 20 ATC;  ONCOLOGY INFUSION [facility]        Today's Diagnoses     Peritoneal carcinomatosis (H)    -  1    Cancer of sigmoid colon (H)          Care Instructions    Contact Numbers  [facility]: [phone]    After Hours:  [phone]  Triage: [phone]    Please call the [facility] Triage line if you experience a temperature greater than or equal to 100.5, shaking chills, have uncontrolled nausea, vomiting and/or diarrhea, dizziness, shortness of breath, chest pain, bleeding, unexplained bruising, or if you have any other new/concerning symptoms, questions or concerns.     If it is after hours, weekends, or holidays, please call the main hospital  at  [phone] and ask to speak to the Oncology doctor on call.     If you are having any concerning symptoms or wish to speak to a provider before your next infusion visit, please call your care coordinator or triage to notify them so we can adequately serve you.     If you need a refill on a narcotic prescription or other medication, please call triage before your infusion appointment.           September 2018 Sunday Monday Tuesday Wednesday Thursday Friday Saturday                                 1       2     [facility]    9:00 AM   (15 min.)    [facility]   [facility] Lab Draw     [facility] ONC INFUSION 120    9:30 AM   (120 min.)    ONCOLOGY INFUSION   [facility] 3     4     5     [facility]    1:15 PM   (15 min.)    [facility]   [facility] Lab Draw     [facility] RETURN    1:45 PM   (45 min.)   Lore Calix MD   [facility] BMT INFUSION 120    3:00 PM   (120 min.)    BMT INFUSION   [facility]  Blood and Marrow Transplant 6     7     [facility]    6:45 AM   (15 min.)   [facility]     [facility] RETURN    6:45 AM   (50 min.)   Adrianna Pearson APRN CNP   [facility]     [facility] ONC INFUSION 240    8:00 AM   (240 min.)   UC ONCOLOGY INFUSION   [facility] 8       9     10     11     12     TELEMEDICINE    8:00 AM   (30 min.)   Sherry Coronel [facility] Medication Therapy Management 13     14     15       16     17     18     19     20     21     [facility]   10:45 AM   (15 min.)   [facility]RAW   [facility] Lab Draw     [facility] RETURN   10:55 AM   (50 min.)   L[facility]ves [facility]-C   [facility]   12:30 PM   (240 min.)   UC ONCOLOGY INFUSION   [facility] 22       23     24     25     26     27     28     29       30 October 2018 Sunday Monday Tuesday Wednesday Thursday Friday Saturday        1     2     3     4     [facility]   10:00 AM   (15 min.)    [facility] DRAW   [facility] Lab Draw     CT CHEST/ABDOMEN/PELVIS W   10:40 AM   (20 min.)   UCCT2   [facility]     [facility] RETURN   12:45 PM   (30 min.)   Albert Long MD   [facility]     [facility] ONC INFUSION 240    1:30 PM   (240 min.)   UC ONCOLOGY INFUSION   [facility]     [facility] RETURN    1:45 PM   (30 min.)   Lydia Beckham MD   [facility] 5     6       7     8     9     10     11     12     13       14     15     16     17     18     19     20       21     22     23     24     25     26     27       28     29     30     31     TELEMEDICINE   10:30 AM   (30 min.)   Sherry Coronel [facility] Medication Therapy Management                             Lab Results:  Recent Results (from the past 12 hour(s))   CBC with platelets differential    Collection  Time: 09/21/18 11:09 AM   Result Value Ref Range    WBC 11.0 4.0 - 11.0 10e9/L    RBC Count 3.11 (L) 4.4 - 5.9 10e12/L    Hemoglobin 8.3 (L) 13.3 - 17.7 g/dL    Hematocrit 25.8 (L) 40.0 - 53.0 %    MCV 83 78 - 100 fl    MCH 26.7 26.5 - 33.0 pg    MCHC 32.2 31.5 - 36.5 g/dL    RDW 18.6 (H) 10.0 - 15.0 %    Platelet Count 407 150 - 450 10e9/L    Diff Method Manual Differential     % Neutrophils 62.8 %    % Lymphocytes 17.7 %    % Monocytes 6.2 %    % Eosinophils 0.9 %    % Basophils 0.0 %    % Metamyelocytes 7.1 %    % Myelocytes 3.5 %    % Promyelocytes 1.8 %    Nucleated RBCs 1 (H) 0 /100    Absolute Neutrophil 6.9 1.6 - 8.3 10e9/L    Absolute Lymphocytes 1.9 0.8 - 5.3 10e9/L    Absolute Monocytes 0.7 0.0 - 1.3 10e9/L    Absolute Eosinophils 0.1 0.0 - 0.7 10e9/L    Absolute Basophils 0.0 0.0 - 0.2 10e9/L    Absolute Metamyelocytes 0.8 (H) 0 10e9/L    Absolute Myelocytes 0.4 (H) 0 10e9/L    Absolute Promyeloctyes 0.2 (H) 0 10e9/L    Absolute Nucleated RBC 0.1     Anisocytosis Moderate     Poikilocytosis Slight     Platelet Estimate Confirming automated cell count    Comprehensive metabolic panel    Collection Time: 09/21/18 11:09 AM   Result Value Ref Range    Sodium 129 (L) 133 - 144 mmol/L    Potassium 3.6 3.4 - 5.3 mmol/L    Chloride 97 94 - 109 mmol/L    Carbon Dioxide 23 20 - 32 mmol/L    Anion Gap 9 3 - 14 mmol/L    Glucose 115 (H) 70 - 99 mg/dL    Urea Nitrogen 18 7 - 30 mg/dL    Creatinine 0.60 (L) 0.66 - 1.25 mg/dL    GFR Estimate >90 >60 mL/min/1.7m2    GFR Estimate If Black >90 >60 mL/min/1.7m2    Calcium 8.2 (L) 8.5 - 10.1 mg/dL    Bilirubin Total 0.3 0.2 - 1.3 mg/dL    Albumin 1.8 (L) 3.4 - 5.0 g/dL    Protein Total 6.4 (L) 6.8 - 8.8 g/dL    Alkaline Phosphatase 131 40 - 150 U/L    ALT 22 0 - 70 U/L    AST 21 0 - 45 U/L   Magnesium    Collection Time: 09/21/18 11:09 AM   Result Value Ref Range    Magnesium 1.9 1.6 - 2.3 mg/dL               Follow-ups after your visit        Your next 10 appointments  already scheduled     Oct 04, 2018 10:00 AM CDT   [facility] with  [facility]RAW   [facility] ([facility])    [address]  [address]  [facility] [address] [phone]            Oct 04, 2018 10:40 AM CDT   CT CHEST/ABDOMEN/PELVIS W CONTRAST with UCCT2   [facility] CT ([facility])    [address]  [address]st Floor  [facility] [address] [phone]           How do I prepare for my exam? (Food and drink instructions) To prepare: Do not eat or drink for 2 hours before your exam. If you need to take medicine, you may take it with small sips of water. (We may ask you to take liquid medicine as well.)  How do I prepare for my exam? (Other instructions) Please arrive 30 minutes early for your CT.  Once in the department you might be asked to drink water 15-20 minutes prior to your exam.  If indicated you may be asked to drink an oral contrast in advance of your CT.  If this is the case, the imaging team will let you know or be in contact with you prior to your appointment  Patients over 70 or patients with diabetes or kidney problems: If you haven t had a blood test (creatinine test) within the last 30 days, the Cardiologist/Radiologist may require you to get this test prior to your exam.  If you have diabetes:  Continue to take your metformin medication on the day of your exam  What should I wear: Please wear loose clothing, such as a sweat suit or jogging clothes. Avoid snaps, zippers and other metal. We may ask you to undress and put on a hospital gown.  How long does the exam take: Most scans take less than 20 minutes.  What should I bring: Please bring any scans or X-rays taken at other hospitals, if similar tests were done. Also bring a list of your medicines, including vitamins, minerals and over-the-counter drugs. It is safest to leave personal items at home.  Do I need a : No  is needed.   What do I need to tell my doctor? Be sure to tell your doctor: * If you have any allergies. * If there s any chance you are pregnant. * If you are breastfeeding.  What should I do after the exam: No restrictions, You may resume normal activities.  What is this test: A CT (computed tomography) scan is a series of pictures that allows us to look inside your body. The scanner creates images of the body in cross sections, much like slices of bread. This helps us see any problems more clearly. You may receive contrast (X-ray dye) before or during your scan. You will be asked to drink the contrast.  Who should I call with questions: If you have any questions, please call the Imaging Department where you will have your exam. Directions, parking instructions, and other information is available on our website, [email or web address].            Oct 04, 2018  1:00 PM CDT   (Arrive by 12:45 PM)   Return Visit with Albert Long MD   [facility] ([facility])    [address] [place] [address]  [address] [place] [address]   [phone]            Oct 04, 2018  1:30 PM CDT   Infusion 240 with UC ONCOLOGY INFUSION, UC 32 ATC   [facility] ([facility])    [address] [place] [address]  [address]  [facility] [address]   [phone]            Oct 04, 2018  2:00 PM CDT   (Arrive by 1:45 PM)   Return Visit with Lydia Beckham MD   [facility] ([facility])    [address] [place] [address]  [address]  [facility] [address]   [phone]            Oct 31, 2018 10:30 AM CDT   [facility])    [address] [place] [address]  [address]  [facility] [address]   [phone]           Note: this is not an onsite visit; there is no need to come to the facility.              Who to contact     If you have questions or need  follow up information about today's clinic visit or your schedule please contact [facility] directly at [phone].  Normal or non-critical lab and imaging results will be communicated to you by Neolanehart, letter or phone within 4 business days after the clinic has received the results. If you do not hear from us within 7 days, please contact the clinic through Is[employer] or phone. If you have a critical or abnormal lab result, we will notify you by phone as soon as possible.  Submit refill requests through [employer] or call your pharmacy and they will forward the refill request to us. Please allow 3 business days for your refill to be completed.          Additional Information About Your Visit        Neol[employer]ift Information     [employer] gives you secure access to your electronic health record. If you see a primary care provider, you can also send messages to your care team and make appointments. If you have questions, please call your primary care clinic.  If you do not have a primary care provider, please call [phone] and they will assist you.        Care EveryWhere ID     This is your Care EveryWhere ID. This could be used by other organizations to access your [place] medical records  [id number]         Blood Pressure from Last 3 Encounters:   09/21/18 105/64   09/07/18 118/75   09/05/18 119/67    Weight from Last 3 Encounters:   09/21/18 65.1 kg (143 lb 9.6 oz)   09/07/18 64.1 kg (141 lb 4.8 oz)   09/05/18 65 kg (143 lb 4.8 oz)              We Performed the Following     CBC with platelets differential     Comprehensive metabolic panel     Magnesium          Today's Medication Changes          These changes are accurate as of 9/21/18  1:32 PM.  If you have any questions, ask your nurse or doctor.               These medicines have changed or have updated prescriptions.        Dose/Directions    dexamethasone 1 MG tablet   Commonly known as:  DECADRON   This may have changed:    - medication  strength  - how much to take  - additional instructions   Used for:  Colon adenocarcinoma (H), Neoplasm related pain   Changed by:  Linda Alves PA-C        Dose:  2 mg   Take 2 tablets (2 mg) by mouth daily for 7 days Then 1 mg daily x 7 days, then stop.   Quantity:  21 tablet   Refills:  0            Where to get your medicines      These medications were sent to [facility] - [address] [address]     Phone:  [phone]     dexamethasone 1 MG tablet                Primary Care Provider Office Phone # Fax #    Jaguar Becker [phone]       [facility] [address]  [facility] [address]        Equal Access to Services     MELVIN DUNN : Cristina spiveyo Soshu, waaxda luqadaha, qaybta kaalmada adeegyada, bhargav sage . So [facility] [phone].    ATENCIÓN: Si habla español, tiene a cid disposición servicios gratuitos de asistencia lingüística. [facility] [phone].    We comply with applicable federal civil rights laws and [place] laws. We do not discriminate on the basis of race, color, national origin, age, disability, sex, sexual orientation, or gender identity.            Thank you!     Thank you for choosing [facility]  for your care. Our goal is always to provide you with excellent care. Hearing back from our patients is one way we can continue to improve our services. Please take a few minutes to complete the written survey that you may receive in the mail after your visit with us. Thank you!             Your Updated Medication List - Protect others around you: Learn how to safely use, store and throw away your medicines at www.disposemymeds.org.          This list is accurate as of 9/21/18  1:32 PM.  Always use your most recent med list.                   Brand Name Dispense Instructions for use Diagnosis    dexamethasone 1 MG tablet     DECADRON    21 tablet    Take 2 tablets (2 mg) by mouth daily for 7 days Then 1 mg daily x 7 days, then stop.    Colon adenocarcinoma (H), Neoplasm related pain       fentaNYL 100 mcg/hr 72 hr patch    DURAGESIC    30 patch    Place 3 patches onto the skin every 72 hours remove old patch.    Colon adenocarcinoma (H), Neoplasm related pain, Peritoneal carcinomatosis (H)       * hydromorphone HCl 500 MG/50ML Soln     25 mL    Dilute 250mg in 250ml NS  0.9mg IV every 10 mins as needed  Dispense 1 bag (250mg = 25ml)    Neoplasm related pain       * hydromorphone HCl 500 MG/50ML Soln     25 mL    Dilute 250mg in 250ml NS  0.9mg IV every 10 mins as needed  Dispense 1 bag (250mg = 25ml)    Neoplasm related pain       * hydromorphone HCl 500 MG/50ML Soln   Start taking on:  9/27/2018     25 mL    Dilute 250mg in 250ml NS  0.9mg IV every 10 mins as needed  Dispense 1 bag (250mg = 25ml)    Neoplasm related pain       * hydromorphone HCl 500 MG/50ML Soln   Start taking on:  10/4/2018     25 mL    Dilute 250mg in 250ml NS  0.9mg IV every 10 mins as needed  Dispense 1 bag (250mg = 25ml)    Neoplasm related pain       loperamide 2 MG capsule    IMODIUM     Take 2 mg by mouth as needed for diarrhea        LORazepam 0.5 MG tablet    ATIVAN    30 tablet    Take 1 tablet (0.5 mg) by mouth every 4 hours as needed (Anxiety, Nausea/Vomiting or Sleep)    Peritoneal carcinomatosis (H), Cancer of sigmoid colon (H)       naloxone nasal spray    NARCAN    0.2 mL    Spray 1 spray (4 mg) into one nostril alternating nostrils as needed for opioid reversal every 2-3 minutes until assistance arrives    Cancer of sigmoid colon (H), Long term current use of opiate analgesic       ondansetron 8 MG ODT tab    ZOFRAN-ODT    60 tablet    Take 1 tablet (8 mg) by mouth every 8 hours as needed for nausea    Nausea       order for DME     1 Device    Equipment being ordered: home suction machine with tubing for connection to venting G-tube Treatment  Diagnosis: colon adenocarcinoma    Colon adenocarcinoma (H), Small bowel obstruction       pantoprazole 40 MG EC tablet    PROTONIX    30 tablet    Take 1 tablet (40 mg) by mouth every morning (before breakfast)    History of recent steroid use       parenteral nutrition - PTA/DISCHARGE ORDER      Inject into the vein daily [facility]        prochlorperazine 10 MG tablet    COMPAZINE     10 mg every 6 hours as needed        sodium chloride 0.9% Soln BOLUS     1000 mL    Inject 1,000 mLs into the vein daily as needed For hydration.        * Notice:  This list has 4 medication(s) that are the same as other medications prescribed for you. Read the directions carefully, and ask your doctor or other care provider to review them with you.

## 2018-09-21 NOTE — PROGRESS NOTES
Oncology/Hematology Visit Note  Sep 21, 2018    Reason for Visit: seen in f/u of metastatic colon cancer    Oncology HPI: Sebas Lopez is a 53 year old man with a PMH of ulcerative colitis. He presented in May 2017 with an obstructing sigmoid mass, biopsied positive for adenocarcinoma. He developed obstructive symptoms and was found to have peritoneal carcinomatosis at the time of exploratory surgery. He underwent lysis of adhesions and small bowel resection with end ileostomy. He was started was on  FOLFOX from August 2017-June 2018. He was considered for HIPEC in Dec 2017, but at the time of exploratory laparotomy, his peritoenal disease was too extensive. His course was complicated with colonic obstruction for which he was hospitalized in January 2018. He underwent colon stenting He also had complicating bacteremia in Feb 2018, an infected port was removed. He was treated with IV antibiotics via PICC. In May 2018, he was found to have improved disease and the 5FU bolus was discontinued. In July 2018, he was found to have worsening peritoneal disease and colitis. A 3rd colon stent was placed. He was readmitted with SBO and PAULINA and a venting G tube was placed.   He initiated Irinotecan and Panitumumab on 8/17/18. His course has been complicated thus far with dehydration.  He is here today for routine follow up prior to cycle 3.     Interval history:   Patient reports that he is having some cracking of his fingertips.  He reports that he is having no issues with the skin on his feet.  He he does have some stable abdominal pain, managed with fentanyl and uses his Dilaudid PCA up to 10 times per day.  He has a venting G-tube that is open about 1 hour per day.  He usually gets out about 500 mL at a time.  He remains on TPN at 12 hours per day.  He is using about 1.5 L of IV normal saline per day.  He is drinking some Gatorade, pop, and cam boot, for a total of about 1/2 gallon per day.  He typically empties his ostomy  bag about 3-4 times a day with liquid consistency.  He has noticed hair loss.  He is taking his Zofran twice a day for nausea.  He denies any acid reflux.  He is rarely taking Ativan or Compazine.  He is taking 4 mg of Imodium 2-3 times a day.  He denies other concerns.    Current Outpatient Prescriptions   Medication Sig Dispense Refill     dexamethasone (DECADRON) 1 MG tablet Take 2 tablets (2 mg) by mouth daily for 7 days Then 1 mg daily x 7 days, then stop. 21 tablet 0     fentaNYL (DURAGESIC) 100 mcg/hr 72 hr patch Place 3 patches onto the skin every 72 hours remove old patch. 30 patch 0     hydromorphone HCl 500 MG/50ML SOLN Dilute 250mg in 250ml NS    0.9mg IV every 10 mins as needed    Dispense 1 bag (250mg = 25ml) 25 mL 0     [START ON 10/4/2018] hydromorphone HCl 500 MG/50ML SOLN Dilute 250mg in 250ml NS    0.9mg IV every 10 mins as needed    Dispense 1 bag (250mg = 25ml) 25 mL 0     [START ON 9/27/2018] hydromorphone HCl 500 MG/50ML SOLN Dilute 250mg in 250ml NS    0.9mg IV every 10 mins as needed    Dispense 1 bag (250mg = 25ml) 25 mL 0     hydromorphone HCl 500 MG/50ML SOLN Dilute 250mg in 250ml NS    0.9mg IV every 10 mins as needed    Dispense 1 bag (250mg = 25ml) 25 mL 0     loperamide (IMODIUM) 2 MG capsule Take 2 mg by mouth as needed for diarrhea       LORazepam (ATIVAN) 0.5 MG tablet Take 1 tablet (0.5 mg) by mouth every 4 hours as needed (Anxiety, Nausea/Vomiting or Sleep) 30 tablet 5     naloxone (NARCAN) nasal spray Spray 1 spray (4 mg) into one nostril alternating nostrils as needed for opioid reversal every 2-3 minutes until assistance arrives 0.2 mL 0     ondansetron (ZOFRAN-ODT) 8 MG ODT tab Take 1 tablet (8 mg) by mouth every 8 hours as needed for nausea 60 tablet 5     order for DME Equipment being ordered: home suction machine with tubing for connection to venting G-tube  Treatment Diagnosis: colon adenocarcinoma 1 Device 0     pantoprazole (PROTONIX) 40 MG EC tablet Take 1 tablet (40  "mg) by mouth every morning (before breakfast) 30 tablet 0     parenteral nutrition - PTA/DISCHARGE ORDER Inject into the vein daily [facility]       prochlorperazine (COMPAZINE) 10 MG tablet 10 mg every 6 hours as needed        sodium chloride 0.9% SOLN BOLUS Inject 1,000 mLs into the vein daily as needed For hydration. 1000 mL 0       Allergies   Allergen Reactions     Liquid Adhesive Rash     Dermabond, rash with pustules, occurred with abdominal surgery and port removal      Exam:   General: The patient is a pleasant male in no acute distress. He appears chronically ill.  /64 (BP Location: Left arm, Patient Position: Sitting, Cuff Size: Adult Regular)  Pulse 104  Temp 99.3  F (37.4  C) (Oral)  Resp 20  Ht 1.753 m (5' 9.02\")  Wt 65.1 kg (143 lb 9.6 oz)  SpO2 98%  BMI 21.2 kg/m2  Wt Readings from Last 10 Encounters:   09/21/18 65.1 kg (143 lb 9.6 oz)   09/07/18 64.1 kg (141 lb 4.8 oz)   09/05/18 65 kg (143 lb 4.8 oz)   09/02/18 65.4 kg (144 lb 3.2 oz)   08/30/18 63.2 kg (139 lb 4.8 oz)   08/24/18 68.2 kg (150 lb 6.4 oz)   08/17/18 70.1 kg (154 lb 8.7 oz)   08/09/18 71.7 kg (158 lb)   08/09/18 71.8 kg (158 lb 6.4 oz)   08/06/18 71.5 kg (157 lb 10.1 oz)   HEENT: EOMI, PERRL. Sclerae are anicteric. Oral mucosa is pink and moist with no lesions or thrush.   Lymph: Neck is supple with no lymphadenopathy in the cervical or supraclavicular areas.   Heart: Regular rate and rhythm.   Lungs: Clear to auscultation bilaterally.   Abdomen: Bowel sounds present, mildly distended, ostomy bag in place with liquid brown stool, venting G-tube in minimal with minimal surrounding drainage and no erythema, abdomen is firm and mildly tender throughout, left greater than right.   Extremities: No lower extremity edema noted bilaterally.   Neuro: Cranial nerves II through XII are grossly intact.  Skin: Dry and cracked skin noted on fingers. No erythema.     Labs:    9/21/2018 11:09   Sodium 129 (L)   Potassium 3.6   Chloride 97 "   Carbon Dioxide 23   Urea Nitrogen 18   Creatinine 0.60 (L)   GFR Estimate >90   GFR Estimate If Black >90   Calcium 8.2 (L)   Anion Gap 9   Magnesium 1.9   Albumin 1.8 (L)   Protein Total 6.4 (L)   Bilirubin Total 0.3   Alkaline Phosphatase 131   ALT 22   AST 21   Glucose 115 (H)   WBC 11.0   Hemoglobin 8.3 (L)   Hematocrit 25.8 (L)   Platelet Count 407   RBC Count 3.11 (L)   MCV 83   MCH 26.7   MCHC 32.2   RDW 18.6 (H)   Diff Method Manual Differential   % Neutrophils 62.8   % Lymphocytes 17.7   % Monocytes 6.2   % Eosinophils 0.9   % Basophils 0.0   % Metamyelocytes 7.1   % Myelocytes 3.5   % Promyelocytes 1.8   Nucleated RBCs 1 (H)   Absolute Neutrophil 6.9   Absolute Lymphocytes 1.9   Absolute Monocytes 0.7   Absolute Eosinophils 0.1   Absolute Basophils 0.0   Absolute Metamyelocytes 0.8 (H)   Absolute Myelocytes 0.4 (H)   Absolute Promyelocytes 0.2 (H)   Absolute Nucleated RBC 0.1   Anisocytosis Moderate   Poikilocytosis Slight   Platelet Estimate Confirming automa...     Impression/plan:   1. Stage IV colon cancer with peritoneal carcinomatosis and an obstructing sigmoid colon mass  -progressed on FOLFOX, now on Irinotecan/Panitumumab since 8/17/18  -He feels this treatment has been fairly well tolerated. He will continue with cycle 3 today.  -He will see Dr. Long in 2 weeks with a CT CAP prior to consideration of additional treatment.     2. Chronic bowel obstruction s/p placement of venting G tube, on TPN  -sx are managed well with venting tube, currently open 1 hour/day.  -pain is well controlled with fentanyl patches and hydromorphone pca. Follows with palliative care.     3. FEN: maintaining with NS 1.5 L and TPN 12 hours daily  -Cr is normal today. Appears to be getting in adequate hydration.   -Albumin has declined. If continues to decline, may need to consider additional protein in his TPN. Will review with [facility] infusion.   -Sodium remains mildly low. He will continue with 1.5L IV NS daily.    4.  Derm:  -Recommend urea based cream bid to hands to help with dry skin. Possibly related to panitumumab.    5. Heme:  -Anemia: Most likely anemia of chronic disease with decline due to chemotherapy. Has a history of iron deficient anemia, but recent evaluation in August is consistent with anemia of chronic disease. Discussed coming in next week for labs and possible transfusions versus calling if develops symptoms of anemia. Patient prefers to call. Would consider transfusion if hemoglobin less than or = 8.    Linda Alves PA-C  [facility]  [address] [place] [address] [phone]

## 2018-09-21 NOTE — PROGRESS NOTES
Infusion Nursing Note:  Sebas Lopez presents today for Cycle 3 Day 1 of Vectibix and Irinotecan.    Patient seen by provider today: Yes: ANNAMARIE Mckenna   present during visit today: Not Applicable.    Note: Patient with pain at 4/10 in his mid-abdomen. Per patient, he takes PRN Hydromorphone at home while helps. Denies any intervention while in infusion. Patient had a lot of abdominal crapping at the end of his irinotecan infusion. Patient given PRN atropine, and state he had some relief when reassessed 15 minutes later.     Intravenous Access:  PICC.    Treatment Conditions:  Lab Results   Component Value Date    HGB 8.3 09/21/2018     Lab Results   Component Value Date    WBC 11.0 09/21/2018      Lab Results   Component Value Date    ANEU 6.9 09/21/2018     Lab Results   Component Value Date     09/21/2018      Lab Results   Component Value Date     09/21/2018                   Lab Results   Component Value Date    POTASSIUM 3.6 09/21/2018           Lab Results   Component Value Date    MAG 1.9 09/21/2018            Lab Results   Component Value Date    CR 0.60 09/21/2018                   Lab Results   Component Value Date    ANNAMARIE 8.2 09/21/2018                Lab Results   Component Value Date    BILITOTAL 0.3 09/21/2018           Lab Results   Component Value Date    ALBUMIN 1.8 09/21/2018                    Lab Results   Component Value Date    ALT 22 09/21/2018           Lab Results   Component Value Date    AST 21 09/21/2018       Results reviewed, labs MET treatment parameters, ok to proceed with treatment.      Post Infusion Assessment:  Patient tolerated infusion without incident.  Blood return noted pre and post infusion.  Site patent and intact, free from redness, edema or discomfort.  No evidence of extravasations.    Discharge Plan:   Patient declined prescription refills.  Discharge instructions reviewed with: Patient.  Patient and/or family verbalized understanding of  discharge instructions and all questions answered.  Copy of AVS reviewed with patient and/or family.  Patient will return 10/4/18 for next appointment.  Patient discharged in stable condition accompanied by: self.  Departure Mode: Ambulatory.    Sherry Moraes RN

## 2018-09-21 NOTE — PROGRESS NOTES
This is a recent snapshot of the patient's [facility] medical record.  For current drug dose and complete information and questions, call [phone] or In Basket pool, [facility] home infusion ([id number])  CSN Number:  [id number]

## 2018-09-25 NOTE — TELEPHONE ENCOUNTER
DATE:  9/25/2018   TIME OF RECEIPT FROM LAB:  8405   LAB TEST:  WBC/ANC   LAB VALUE:  WBC 1.8, ANC 0.5. Also reporting temp of 100 but no other symptoms. Next lab check planned 10/2.  RESULTS GIVEN WITH READ-BACK TO (PROVIDER):  Dr. Long at 6827.   RECOMMENDATIONS: Per Dr. Long, patient should check temperature regularly and proceed to ED if temp reached 100.4 or greater. Writer contacted patient and educated on neutropenic precautions, reiterating frequent temperature monitoring and reporting to ED if temp reaches 100.4. Plan to check labs again 10/2 as planned, unless patient is symptomatic or febrile. Patient agrees with plan. No additional questions or concerns.

## 2018-09-26 NOTE — PROGRESS NOTES
This is a recent snapshot of the patient's [facility] medical record.  For current drug dose and complete information and questions, call [phone] or In Basket pool, [facility] home infusion ([id number])  CSN Number:  [id number]

## 2018-09-28 NOTE — PROGRESS NOTES
This is a recent snapshot of the patient's [facility] medical record.  For current drug dose and complete information and questions, call [phone] or In [facility] pool, [facility] home infusion ([id number])  CSN Number:  [id number]

## 2018-10-03 NOTE — PROGRESS NOTES
This is a recent snapshot of the patient's [facility] medical record.  For current drug dose and complete information and questions, call [phone] or In Basket pool, [facility] home infusion ([id number])  CSN Number:  [id number]

## 2018-10-04 NOTE — DISCHARGE INSTRUCTIONS

## 2018-10-04 NOTE — NURSING NOTE
Chief Complaint   Patient presents with     Blood Draw     Labs drawn via PICC lines, flushed and saline locked, VS taken     Gray lumen flushed and saline locked, waste 20 ml prior to blood draw. Purple lumen infusing with dilaudid and NS.    Thank You,    sEtelita Saleh  [facility] Triage RN  [phone]

## 2018-10-04 NOTE — PROGRESS NOTES
Oncology follow up visit:  Date on this visit: 10/4/2018         CC  sigmoid adenocarcinoma with peritoneal carcinomatosis- MSI- Intact KRAS wild type    Primary Physician: Waylon Han     History Of Present Illness:     Please see previous note for details. I have copied and updated from prior notes.   Sebas is a 53-year-old male who has a history of ulcerative colitis diagnosed more than 20 years ago, but he has not had medical management for that.  He was doing well, but in 05/2017 he presented with a few weeks of abdominal pain.  At that time, his imaging showed that he had a sigmoid wall thickening with adjacent mesenteric lymphadenopathy and free fluid near the abdominal wall mesh from his prior hernia surgery.  He subsequently underwent a colonoscopy which was incomplete and showed an obstructing sigmoid colon mass.  The biopsy from the sigmoid mass showed sigmoid adenocarcinoma.  He then developed obstructive symptoms and underwent exploratory laparotomy on 07/10/2017.  During that he was found to have diffuse peritoneal carcinomatosis.  Extensive lysis of adhesions was performed and a small bowel resection was performed with end ileostomy.  The biopsies from the multiple large peritoneal metastasis also were positive for metastatic adenocarcinoma.      He started palliative FOLFOX on 8/11/17. C#6 given on 10/26/17  After 6 cycles, he has stable disease    On 12/6/17, he had Diagnostic laparoscopy and Exploratory laparotomy, but HIPEC was not performed as Peritoneal cancer index was 26 with diffuse involvement of the small bowel as well as the small bowel mesentery.     He then presented to ED on 1/26 with abdominal pain and associated nausea. CT A/P on 1/26 with 5.2 x 4.5 x 3.6 cm proximal sigmoid mass causnig colonic obstriction with singificant distention of cecum (7cm in diameter), ascending colon and proximal transverse colon. No pneumatosis or pneumoperitoneum. Also small volume of ascites with  associated findings concerning for peritoneal carcinomatosis, increased from prior studies. Dr. Dudley was consulted and recommended no surgical intervention.    He was then seen in clinic on 1/29/2018 as well as yesterday because for the last few days he was unable to tolerate solid foods and he was getting abdominal cramping and some nausea. He also noticed that his ostomy output decreased. He was given IV fluids as well as antibiotics and yesterday he was started on OxyContin 10 mg twice a day along with p.r.n. oxycodone. Of note a few weeks ago he was started on short acting octreotide to decrease the ostomy output but he stopped taking it about one week ago and few days prior to that he noticed worsening of the abdominal cramping.      Repeat CT scan showed worsening peritoneal carcinomatosis and he is getting more symptomatic in terms of worsening abdominal pain and difficulty eating because of worsening abdominal cramping. He was admitted to the hospital with worsening colon distention and colon stent was placed which improved his symptoms.     He was recently hospitalized from 2/20-2/25/18 with presumed infectious colitis and bacteremia. His port was removed. He was discharged home on IV vancomycin via a PICC line which he completed on 3/7/2018.     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 He was hospitalized with a bowel obstruction from 3/18-3/20/18 and a colonic stent was placed on 3/19/18.  C#8 3/22/18 FOLFOX  C#9 4/5/18 FOLFOX      Repeat CT shows slightly improved disease and less ascites now.   Because of progressive fatigue, we stopped the 5-FU bolus and leucovorin and continued with the 5-FU infusion and oxaliplatin.     C#10 5/4/18  C#11 5/18/18  C#13 5/31/18  C#13 6/15/18    Repeat CT scan showed worsening peritoneal disease. There is also increased fluid in the proximal control on as well as post stent fluid in the rectum. This could be  consistent with colitis  I gave him levofloxacin/flagyl and plan was to switch therapy to irinotecan and panitumumab though was hospitalized on 7/3 for abdominal pain and bloating as well as rectal bleeding. Found to have colitis with increased fluid dilation of the colon. GI did a flex sig and placed a colon stent for the third time. Other stents were found to have ingrowth of tumor.   Again admitted on 7/24 with SBO and PAULINA. Venting G tube placed which helped with N/V.      He initiated Irinotecan and Panitumumab on 8/17/18.  Cycle #2 was delayed because of dehydration and Acute kidney injury and hyponatremia and weight loss.  Cycle #2 on 9/7/2018  Cycle #3 on 9/21/2018    Repeat CT scan done today shows progression of the disease with worsening peritoneal carcinomatosis as well as increase in the size of the primary tumor.  This is a preliminary report which I discussed with the radiologist.    He tells me that overall he is doing okay but he feels tired and is resting most of the time.  He had nausea for few days for which she is taking Zofran.  He is trying to eat soft food and is taking 64 ounces per day.  Ostomy is working well.  He has to drain through the venting G-tube on average once a day.  He is supplementing his fluids by taking 1500 cc of IV fluids daily.  He continues to be on TPN.  Overall his pain is under decent control with fentanyl patch and Dilaudid PCA.  He can swallow pills but occasionally he notices that he can see the pill in his ostomy.  Denies new swellings.  No neuropathy.  No serious infections.  No new skin rashes.      ECOG 2    ROS:  Otherwise a comprehensive review of the system was essentially unremarkable        I reviewed her history in Epic as below      Past Medical/Surgical History:  Past Medical History:   Diagnosis Date     Adenocarcinoma of sigmoid colon (H)     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     Ulcerative colitis (H)      Past Surgical History:   Procedure Laterality Date     COLONOSCOPY  2017     COLONOSCOPY N/A 11/30/2017    Procedure: COLONOSCOPY;  Colonoscopy;  Surgeon: Rob Dudley MD;  Location: [facility]     COLONOSCOPY N/A 2/6/2018    Procedure: COMBINED COLONOSCOPY, STENT PLACEMENT;  COMBINED COLONOSCOPY with Colonic Stent Placement;  Surgeon: Guru Lionel Mar MD;  Location: [facility]     COLONOSCOPY N/A 3/19/2018    Procedure: COMBINED COLONOSCOPY, STENT PLACEMENT;  flexible sigmoidoscopy with stent placement and dilation;  Surgeon: Alexis Rios MD;  Location: [facility]     COLONOSCOPY N/A 7/5/2018    Procedure: COMBINED COLONOSCOPY, STENT PLACEMENT;  flexible sigmoidoscopy with colonic stent placement ;  Surgeon: Alexis Rios MD;  Location: [facility]     GI SURGERY  07/10/2017    Extensive lysis of adhesions, small bowel resection with end ileostomy.      HERNIA REPAIR       INSERT PORT VASCULAR ACCESS Right 8/10/2017    Procedure: INSERT PORT VASCULAR ACCESS;  Single Lumen Right Chest Power Port;  Surgeon: Malena Andrew PA-C;  [facility]: [facility]     LAPAROSCOPY DIAGNOSTIC (GENERAL) N/A 12/6/2017    Procedure: LAPAROSCOPY DIAGNOSTIC (GENERAL);  Diagnostic Laparoscopy, Exploratory Laparotomy Anesthesia Block ;  Surgeon: Rob Dudley MD;  Location: [facility]     LAPAROTOMY EXPLORATORY N/A 12/6/2017    Procedure: LAPAROTOMY EXPLORATORY;;  Surgeon: Rob Dudley MD;  Location: [facility]     ORTHOPEDIC SURGERY Left     HIP ARTHROPLASTY     PICC INSERTION Right 02/23/2018    5Fr DL BioFlo PICC, 44cm (3cm external) in the R basilic vein w/ tip in the SVC RA junction      Ulcerative colitis.  Left hip replacement.       Cancer History:   As above    Allergies:  Allergies as of 10/04/2018 - Julius as Reviewed 10/04/2018    Allergen Reaction Noted     Liquid adhesive Rash 03/01/2018     Current Medications:  Current Outpatient Prescriptions   Medication Sig Dispense Refill     fentaNYL (DURAGESIC) 100 mcg/hr 72 hr patch Place 3 patches onto the skin every 72 hours remove old patch. 30 patch 0     hydromorphone HCl 500 MG/50ML SOLN Dilute 250mg in 250ml NS    0.9mg IV every 10 mins as needed    Dispense 1 bag (250mg = 25ml) 25 mL 0     hydromorphone HCl 500 MG/50ML SOLN Dilute 250mg in 250ml NS    0.9mg IV every 10 mins as needed    Dispense 1 bag (250mg = 25ml) 25 mL 0     hydromorphone HCl 500 MG/50ML SOLN Dilute 250mg in 250ml NS    0.9mg IV every 10 mins as needed    Dispense 1 bag (250mg = 25ml) 25 mL 0     hydromorphone HCl 500 MG/50ML SOLN Dilute 250mg in 250ml NS    0.9mg IV every 10 mins as needed    Dispense 1 bag (250mg = 25ml) 25 mL 0     loperamide (IMODIUM) 2 MG capsule Take 2 mg by mouth as needed for diarrhea       LORazepam (ATIVAN) 0.5 MG tablet Take 1 tablet (0.5 mg) by mouth every 4 hours as needed (Anxiety, Nausea/Vomiting or Sleep) 30 tablet 5     naloxone (NARCAN) nasal spray Spray 1 spray (4 mg) into one nostril alternating nostrils as needed for opioid reversal every 2-3 minutes until assistance arrives 0.2 mL 0     ondansetron (ZOFRAN-ODT) 8 MG ODT tab Take 1 tablet (8 mg) by mouth every 8 hours as needed for nausea 60 tablet 5     order for DME Equipment being ordered: home suction machine with tubing for connection to venting G-tube  Treatment Diagnosis: colon adenocarcinoma 1 Device 0     pantoprazole (PROTONIX) 40 MG EC tablet Take 1 tablet (40 mg) by mouth every morning (before breakfast) 30 tablet 0     parenteral nutrition - PTA/DISCHARGE ORDER Inject into the vein daily [facility]       prochlorperazine (COMPAZINE) 10 MG tablet 10 mg every 6 hours as needed        sodium chloride 0.9% SOLN BOLUS Inject 1,000 mLs into the vein daily as needed For hydration. 1000 mL 0     dexamethasone (DECADRON) 1 MG tablet  Take 2 tablets (2 mg) by mouth daily for 7 days Then 1 mg daily x 7 days, then stop. 21 tablet 0      Family History:  Family History   Problem Relation Age of Onset     Hypertension Father      Diabetes Father       No family history of cancer.  He does not have any kids.       Social History:  Social History     Social History     Marital status: Single     Spouse name: N/A     Number of children: N/A     Years of education: N/A     Occupational History     Not on file.     Social History Main Topics     Smoking status: Never Smoker     Smokeless tobacco: Never Used     Alcohol use 3.0 oz/week     5 Cans of beer per week      Comment: none for last 4 months     Drug use: No     Sexual activity: Not on file     Other Topics Concern     Not on file     Social History Narrative   He does not smoke and does not drink.  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  He lives with his girlfriend, Bessie.       Physical Exam:  /69 (BP Location: Left arm, Patient Position: Sitting, Cuff Size: Adult Regular)  Pulse 107  Temp 98.4  F (36.9  C) (Oral)  Resp 18  Wt 64.3 kg (141 lb 12.8 oz)  SpO2 98%  BMI 20.93 kg/m2  CONSTITUTIONAL: no acute distress  EYES: PERRLA, there is mild pallor but no icterus  ENT/MOUTH: no mouth lesions. Ears normal  CVS: s1s2 no m r g .   RESPIRATORY: clear to auscultation b/l  GI: Abdomen is firm and is not particularly tender.  Ostomy site is clean.  He has had liquidy stool.  Venting G-tube site is clean  NEURO: AAOX3  Grossly non focal neuro exam  INTEGUMENT: no obvious rashes  LYMPHATIC: no palpable cervical, supraclavicular, axillary LAD  MUSCULOSKELETAL: Unremarkable. No bony tenderness.   EXTREMITIES: no edema  PSYCH: Mentation, mood and affect are normal. Decision making capacity is intact            Laboratory/Imaging Studies    Results for KARINA MINER (MRN [id number]) as of 10/4/2018 11:58   Ref. Range 10/4/2018 10:36   Sodium Latest Ref Range: 133 - 144 mmol/L  126 (L)   Potassium Latest Ref Range: 3.4 - 5.3 mmol/L 3.6   Chloride Latest Ref Range: 94 - 109 mmol/L 87 (L)   Carbon Dioxide Latest Ref Range: 20 - 32 mmol/L 28   Urea Nitrogen Latest Ref Range: 7 - 30 mg/dL 25   Creatinine Latest Ref Range: 0.66 - 1.25 mg/dL 0.69   GFR Estimate Latest Ref Range: >60 mL/min/1.7m2 >90   GFR Estimate If Black Latest Ref Range: >60 mL/min/1.7m2 >90   Calcium Latest Ref Range: 8.5 - 10.1 mg/dL 8.3 (L)   Anion Gap Latest Ref Range: 3 - 14 mmol/L 11   Magnesium Latest Ref Range: 1.6 - 2.3 mg/dL 1.8   Albumin Latest Ref Range: 3.4 - 5.0 g/dL 1.9 (L)   Protein Total Latest Ref Range: 6.8 - 8.8 g/dL 6.9   Bilirubin Total Latest Ref Range: 0.2 - 1.3 mg/dL 0.2   Alkaline Phosphatase Latest Ref Range: 40 - 150 U/L 159 (H)   ALT Latest Ref Range: 0 - 70 U/L 17   AST Latest Ref Range: 0 - 45 U/L 16   Glucose Latest Ref Range: 70 - 99 mg/dL 121 (H)   WBC Latest Ref Range: 4.0 - 11.0 10e9/L 13.0 (H)   Hemoglobin Latest Ref Range: 13.3 - 17.7 g/dL 8.3 (L)   Hematocrit Latest Ref Range: 40.0 - 53.0 % 26.5 (L)   Platelet Count Latest Ref Range: 150 - 450 10e9/L 612 (H)   RBC Count Latest Ref Range: 4.4 - 5.9 10e12/L 3.21 (L)   MCV Latest Ref Range: 78 - 100 fl 83   MCH Latest Ref Range: 26.5 - 33.0 pg 25.9 (L)   MCHC Latest Ref Range: 31.5 - 36.5 g/dL 31.3 (L)   RDW Latest Ref Range: 10.0 - 15.0 % 18.2 (H)   Diff Method Unknown Manual Differential   % Neutrophils Latest Units: % 52.5   % Lymphocytes Latest Units: % 14.9   % Monocytes Latest Units: % 15.8   % Eosinophils Latest Units: % 0.9   % Basophils Latest Units: % 1.8   % Metamyelocytes Latest Units: % 4.4   % Myelocytes Latest Units: % 8.8   % Promyelocytes Latest Units: % 0.9   Nucleated RBCs Latest Ref Range: 0 /100 1 (H)   Absolute Neutrophil Latest Ref Range: 1.6 - 8.3 10e9/L 6.8   Absolute Lymphocytes Latest Ref Range: 0.8 - 5.3 10e9/L 1.9   Absolute Monocytes Latest Ref Range: 0.0 - 1.3 10e9/L 2.1 (H)   Absolute Eosinophils Latest Ref  Range: 0.0 - 0.7 10e9/L 0.1   Absolute Basophils Latest Ref Range: 0.0 - 0.2 10e9/L 0.2   Absolute Metamyelocytes Latest Ref Range: 0 10e9/L 0.6 (H)   Absolute Myelocytes Latest Ref Range: 0 10e9/L 1.1 (H)   Absolute Promyelocytes Latest Ref Range: 0 10e9/L 0.1 (H)   Absolute Nucleated RBC Unknown 0.1           EXAMINATION: CT ABDOMEN PELVIS W/O CONTRAST, 7/24/2018 4:27 PM     TECHNIQUE:  Helical CT images from the thoracic inlet through the  symphysis pubis were obtained  with contrast.     CONTRAST DOSE: Only oral contrast was used for this study     COMPARISON: CT 7/18/2018, 6/27/2018, 5/3/2018     HISTORY: 52 yo male with stage IV sigmoid adenocarcinoma with  peritoneal metastasis; Cancer of sigmoid colon (H)     FINDINGS:     Chest:   Mild bibasilar atelectasis.     Abdomen and pelvis:  Limited evaluation secondary to lack of IV contrast.     Again seen implants along the hepatic capsule. No focal hepatic  lesion. Unremarkable gallbladder. The spleen and adrenals are within  normal limits. Atrophic pancreas. Unchanged noncontrast appearance of  the exophytic left renal cysts.No hydronephrosis or renal calculus.  Unremarkable urinary bladder.     Redemonstration of extensive peritoneal carcinomatosis, which appears  to have not significantly changed since the CT 7/3/2018, fine details  are difficult to discern on this noncontrast study.     Again seen two in situ colonic stent. Interval placement of new third  stent which predominantly is in the sigmoid colon. Postsurgical  changes of distal small bowel resection in the right mid abdomen and  right lower quadrant ileostomy.      Interval increased dilatation of the stomach and proximal small bowel  which measure up to 5 cm. Decompressed loops of bowel in the pelvis,  best appreciated on the coronal images. The transition point is left  lower quadrant  difficult to exactly identify secondary to lack of IV  contrast.      Decreased fluid in the colon.      No  ascites. No pneumatosis, portal venous gas or free air. No  abdominopelvic lymphadenopathy.     Bones and soft tissues:  Postsurgical changes of left total hip arthroplasty. Lucent foci in  the iliac bones. Chronic deformities is of the multiple left ribs.          IMPRESSION:   In this patient with history of sigmoid adenocarcinoma with extensive  peritoneal carcinomatosis.  1. New dilated loops of small bowel measuring up to 5 cm with upstream  fluid retention in the stomach. The transition point is in the left  lower quadrant. Precise anatomy is difficult to discern due to lack of  IV contrast. Findings consistent with small bowel obstruction. No  evidence of perforation or significant mesenteric stranding.  2. New third colonic stent on the already existing colonic stents.  Decreased fluid in the colon.  3. Poorly characterized, not significantly changed sigmoid colon mass  and peritoneal carcinomatosis.         EXAMINATION: CT CHEST/ABDOMEN/PELVIS W CONTRAST, 6/27/2018 11:19 AM     TECHNIQUE:  Helical CT images from the thoracic inlet through the  symphysis pubis were obtained  with contrast.     CONTRAST DOSE: Isovue 370  95 mls     COMPARISON: CT 5/3/2018, 2/20/2018, 2/5/2018     HISTORY: follow up of colon cancer; Cancer of sigmoid colon (H)     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 Status  post expiratory laparotomy and and ileostomy along with small bowel  resection, on FOLFOX palliative chemotherapy,  FINDINGS:     Chest:   Right arm PICC tip projects at the caval atrial junction.     Heart size is within normal limits. No pericardial effusion. The  pulmonary artery and thoracic aorta are within normal limits.      No mediastinal and perihilar lymphadenopathy.      Central tracheobronchial tree is patent. Unchanged tiny right upper  lobe calcified nodule (4 image 27). No pleural effusion or  pneumothorax.     Abdomen and pelvis:  No significantly changed hepatic scalloping measuring  approximately 7  mm in thickness along the anterior aspect (series 6 image 98).  Unremarkable gallbladder.  The spleen and adrenals are within normal  limits. Unremarkable pancreas. Unchanged bilateral renal cysts. No  hydronephrosis or renal calculus. Unremarkable urinary bladder.     Again seen sigmoid colonic stent. Increased proximal colonic fluid   with colon measuring up to 6 cm in maximal diameter . Increased post  stent fluid in the rectum as well. Partial small bowel resection with  right lower quadrant ileostomy. Again seen wall thickening involving  the colon. No significantly changed small bowel thickening     Slight interval increase in extent of diffuse peritoneal disease with  extensive peritoneal implants, for example near complete obliteration  of the pelvic fat planes on the series 3 image 490, and 436. Lower  anterior confluent ill-defined peritoneal masslike thickening measures  approximately 16.9 x 4.8 cm in maximal diameter (6 image 66).  Redemonstration of the sigmoid colon encasement which is slightly  increased. Again seen omental caking  on deep aspect of the anterior  abdominal mesh, slightly increased.     Bones and soft tissues:  Degenerative changes of the lumbar spine. No suspicious bone lesion is  seen. Old healed right clavicle fracture. Small sclerotic focus in the  posterior lateral right sixth rib, unchanged. Left hip total  arthroplasty. Unchanged lucent focus involving the left iliac bone         IMPRESSION:   In this patient with known history of extensive peritoneal  carcinomatosis associated with sigmoid adenocarcinoma:  1a. Status post sigmoid stent. Increased colonic and rectal fluid  since CT 5/3/2018, favored to represent colitis.  1b. Redemonstration of extensive peritoneal carcinomatosis with near  obliteration of pelvic fat planes. Overall disease has slightly  progressed.  1c. No significant change hepatic scalloping.  2. No evidence of metastasis in the  chest.        NGS PANEL COLORECTAL    No mutations were identified in analyzed regions of KRAS, NRAS, BRAF, HRAS, or PIK3CA.       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  The sigmoid carcinoma is obstructing, requiring exploratory laparotomy and end ileostomy along with small bowel resection.    MSI intact and NGS panel does not reveal any identifiable mutations     He has been started on FOLFOX palliative chemotherapy. After 6 cycles, he had stable disease      On 12/6/17, he had Diagnostic laparoscopy and Exploratory laparotomy, but HIPEC was not performed as Peritoneal cancer index was 26 with diffuse involvement of the small bowel as well as the small bowel mesentery.     Repeat CT scan showed worsening peritoneal carcinomatosis and he was getting more symptomatic in terms of worsening abdominal pain and difficulty eating because of worsening abdominal cramping. He was admitted to the hospital with worsening colon distention and colon stent was placed which improved his symptoms.     He was hospitalized from 2/20-2/25/18 with presumed infectious colitis and bacteremia. His port was removed. He was discharged home on IV vancomycin via PICC line. He completed vancomycin on 3/7/2018.    He resumed FOLFOX (C#7) with dose reduced oxaliplatin (70mg/m2) due to previous neuropathy on 3/8/18. He was hospitalized with a bowel obstruction from 3/18-3/20/18 and a colonic stent was placed on 3/19/18.  C#8 3/22/18 FOLFOX  C#9 4/5/18 FOLFOX        Repeat CT shows slightly improved disease and less ascites now.   Because of progressive fatigue, we stopped the 5-FU bolus and leucovorin and continued with the 5-FU infusion and oxaliplatin.     C#10 5/4/18  C#11 5/18/18  C#13 5/31/18  C#13 6/15/18    Repeat CT scan showed worsening peritoneal disease. There is also increased fluid in the proximal control on as well as post stent fluid in the rectum. This could be consistent with  colitis, so I gave him levofloxacin/flagyl and plan was to switch therapy to irinotecan and panitumumab though it has not been started as he was hospitalized on 7/3 for abdominal pain and bloating as well as rectal bleeding. Found to have colitis with increased fluid dilation of the colon. GI did a flex sig and placed a colon stent for the third time. Other stents were found to have ingrowth of tumor.   Again admitted on 7/24 with SBO and PAULINA. Venting G tube placed which helped with N/V.     He initiated Irinotecan and Panitumumab on 8/17/18.  Cycle #2 was delayed because of dehydration and Acute kidney injury and hyponatremia and weight loss.  Cycle #2 on 9/7/2018  Cycle #3 on 9/21/2018    I reviewed his CT scan which was done today with the radiologist and there is definite progression with worsening peritoneal carcinomatosis as well as worsening primary disease of the colon.  Final report is pending.  I discussed the situation with Sebas in detail.  We discussed that now his options are very limited.  I do not want to use a Avastin because he is at a high risk of gut perforation.  We discussed possibility of trying Regorafenib although there is a chance that there would be poor absorption of the medication and we also discussed the possibility of side effects.  We also discussed that the chances of this working is low but we can consider it.  We discussed the rational schedule and potential side effects of it in detail.  He was also given a handout on it.    We also briefly discussed Lonsurf although chances of it working would also be low    We also discussed the other possibility of not trying further anticancer directed therapy and focusing all our effort in making and keeping him as comfortable as possible and going with the hospice approach.    He wants to think about it before making a final decision and I believe it is reasonable.  He will let us know when he has decided one way or the  other.    Hyponatremia and dehydration.  Sodium is 126.  He should continue supplementing with IV fluids 1500 cc daily.  I also encouraged him to drink plenty of fluids.  Renal functions are stable    Abdominal pain.  I refilled fentanyl patch today.  He will continue Dilaudid PCA.  He will follow with palliative care.    Nausea.  I refilled Zofran today.    Anemia due to chemotherapy as well as anemia of chronic disease.  Iron studies in August showed anemia of chronic disease.    Once he has decided we will determine the follow-up accordingly.    I answered all of his questions to his satisfaction.  He is agreeable with the plan    Albert Long

## 2018-10-04 NOTE — LETTER
10/4/2018       RE: Sebas Lopez  [address] [place] [address]     Dear Colleague,    Thank you for referring your patient, Sebas Lopez, to the [facility] at [facility]. Please see a copy of my visit note below.    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 Most recent CT scan showed disease progression and met with Dr. Long who suggested oral chemo with vs hospice.      Reviewed: yes     Interim History:  I met with Brain after he met with Dr. Long and Sebas is in shock that his cancer has progressed. He tells me that in general he is feeling well, pain well controlled and rarely needing PCA. Eating and drinking ok and no sig nausea. Still using TPN and frequent IV fluids and with that, he feels well so because he is feeling so well, he is so surprised by the CT results.  He heard Dr. Long say oral chemo is available but not recommended and recommendation is hospice. Sebas does not want to discuss any of the options at this time as he needs to process with Dr. Long just told him.     Good ostomy output  Draining / venting gtube whenever needing to  Nausea well controlled  Pain very well controlled with current meds and not needing to us PCA very often    Review of Systems:  ROS: 10 point ROS neg other than the symptoms noted above in the HPI         Allergies   Allergen Reactions     Liquid Adhesive Rash     Dermabond, rash with pustules, occurred with abdominal surgery and port removal      Current Outpatient Prescriptions   Medication Sig Dispense Refill     dexamethasone  (DECADRON) 1 MG tablet Take 2 tablets (2 mg) by mouth daily for 7 days Then 1 mg daily x 7 days, then stop. 21 tablet 0     fentaNYL (DURAGESIC) 100 mcg/hr 72 hr patch Place 3 patches onto the skin every 72 hours remove old patch. 30 patch 0     hydromorphone HCl 500 MG/50ML SOLN Dilute 250mg in 250ml NS    0.9mg IV every 10 mins as needed    Dispense 1 bag (250mg = 25ml) 25 mL 0     hydromorphone HCl 500 MG/50ML SOLN Dilute 250mg in 250ml NS    0.9mg IV every 10 mins as needed    Dispense 1 bag (250mg = 25ml) 25 mL 0     hydromorphone HCl 500 MG/50ML SOLN Dilute 250mg in 250ml NS    0.9mg IV every 10 mins as needed    Dispense 1 bag (250mg = 25ml) 25 mL 0     hydromorphone HCl 500 MG/50ML SOLN Dilute 250mg in 250ml NS    0.9mg IV every 10 mins as needed    Dispense 1 bag (250mg = 25ml) 25 mL 0     loperamide (IMODIUM) 2 MG capsule Take 2 mg by mouth as needed for diarrhea       LORazepam (ATIVAN) 0.5 MG tablet Take 1 tablet (0.5 mg) by mouth every 4 hours as needed (Anxiety, Nausea/Vomiting or Sleep) 30 tablet 5     naloxone (NARCAN) nasal spray Spray 1 spray (4 mg) into one nostril alternating nostrils as needed for opioid reversal every 2-3 minutes until assistance arrives 0.2 mL 0     ondansetron (ZOFRAN-ODT) 8 MG ODT tab Take 1 tablet (8 mg) by mouth every 8 hours as needed for nausea 60 tablet 5     order for DME Equipment being ordered: home suction machine with tubing for connection to venting G-tube  Treatment Diagnosis: colon adenocarcinoma 1 Device 0     pantoprazole (PROTONIX) 40 MG EC tablet Take 1 tablet (40 mg) by mouth every morning (before breakfast) 30 tablet 0     parenteral nutrition - PTA/DISCHARGE ORDER Inject into the vein daily [facility]HI       prochlorperazine (COMPAZINE) 10 MG tablet 10 mg every 6 hours as needed        sodium chloride 0.9% SOLN BOLUS Inject 1,000 mLs into the vein daily as needed For hydration. 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     Ulcerative colitis (H)      Past Surgical History:   Procedure Laterality Date     COLONOSCOPY  2017     COLONOSCOPY N/A 11/30/2017    Procedure: COLONOSCOPY;  Colonoscopy;  Surgeon: Rob Dudley MD;  Location: [facility]     COLONOSCOPY N/A 2/6/2018    Procedure: COMBINED COLONOSCOPY, STENT PLACEMENT;  COMBINED COLONOSCOPY with Colonic Stent Placement;  Surgeon: Guru Lionel Mar MD;  Location: [facility]     COLONOSCOPY N/A 3/19/2018    Procedure: COMBINED COLONOSCOPY, STENT PLACEMENT;  flexible sigmoidoscopy with stent placement and dilation;  Surgeon: Alexis Rios MD;  Location: [facility]     COLONOSCOPY N/A 7/5/2018    Procedure: COMBINED COLONOSCOPY, STENT PLACEMENT;  flexible sigmoidoscopy with colonic stent placement ;  Surgeon: Alexis Rios MD;  Location: [facility]     GI SURGERY  07/10/2017    Extensive lysis of adhesions, small bowel resection with end ileostomy.      HERNIA REPAIR       INSERT PORT VASCULAR ACCESS Right 8/10/2017    Procedure: INSERT PORT VASCULAR ACCESS;  Single Lumen Right Chest Power Port;  Surgeon: Malena Andrew PA-C;  [facility]     LAPAROSCOPY DIAGNOSTIC (GENERAL) N/A 12/6/2017    Procedure: LAPAROSCOPY DIAGNOSTIC (GENERAL);  Diagnostic Laparoscopy, Exploratory Laparotomy Anesthesia Block ;  Surgeon: Rob Dudley MD;  Location: [facility]     LAPAROTOMY EXPLORATORY N/A 12/6/2017    Procedure: LAPAROTOMY EXPLORATORY;;  Surgeon: Rob Dudley MD;  Location: [facility]     ORTHOPEDIC SURGERY Left     HIP ARTHROPLASTY     PICC INSERTION Right 02/23/2018    5Fr DL BioFlo PICC, 44cm (3cm external) in the R basilic vein w/ tip in the SVC RA junction         /69  Pulse  "107  Temp 98.4  F (36.9  C) (Oral)  Resp 18  Ht 1.753 m (5' 9\")  Wt 64.3 kg (141 lb 12 oz)  SpO2 98%  BMI 20.93 kg/m2    General: well groomed, alert, appears slightly older than stated age, in NAD  Eyes: EOMI, sclera clear  HEENT: NC/AT; mucous membranes moist  Lungs:  No increased work of breathing, speaking full sentences   Abdomen: ostomy bag in place, abdomen soft  Neuro: A&O x 3; CN II-XII grossly intact; gait normal  Neuropsych: alert, good eye contact, engaged, pleasant, sensorium intact      Key Data Reviewed:  10/4 CT scan:   IMPRESSION:   1. Interval percutaneous gastrostomy tube placement with gastric  decompression and improvement in previously seen small bowel  dilatation.   2. Overall evidence of disease progression with increase in extensive  carcinomatosis and peritoneal tumor burden as well as enlargement of  mesenteric and retroperitoneal lymph nodes. Tumor extension along the liver and subcutaneous soft tissues is also mildly increased. Marked flattening of the infrarenal IVC with adjacent tumor.   3. Overlapping stents are present within the rectum and sigmoid colon, similar in configuration to the prior exam with persistent narrowing of the lumen and intraluminal opacification. Persistent diffuse bowel wall thickening and hyperemia compatible with ongoing  inflammation/infection.  4. New geographic groundglass opacity in the inferomedial left upper  lobe, and tiny subpleural groundglass nodule in the inferomedial right  upper lobe may be infectious/inflammatory. Recommend continued  attention on follow-up imaging. Multiple enlarged right greater than  left cardiophrenic lymph nodes, increased from prior.      Impression:     53 yr old male with progressive, metastatic colon cancer with disease progression noted on most recent scans despite completion of cycle 3 of irinotecan and panitumumab.     Recommendations & Counseling:    Symptom management:     Pain: well controlled  - continue PCA " at current dose (0.9mg IV boluses q10 min PRN) - Sebas states he hasn't been needing many bolus doing which is why it is hard for him to believe his disease has progressed.   - continue fentanyl 100mcg patch q72 hrs    Nausea - well controlled. Continue current meds PRN    Goals of care: Sebas just got news today of disease progression and that while he could try oral chemo with Regorafenib, although according to Sebas, Dr. Long is not optimistic that this will help control the cancer and could have many side effects and therefore recommended hospice.  Sebas needs to think about what next steps should be and let the information he just heard from Dr. Long sink in. Sebas is feeling so well that this news was even more of a shock to him. I offered an informational interview with hospice as a way to gather information but Sebas wanted to think about everything. He will follow up with me in 3-4 weeks.       Again, thank you for allowing me to participate in the care of your patient.      Sincerely,    Lydia Beckham MD

## 2018-10-04 NOTE — NURSING NOTE
"Oncology Rooming Note    October 4, 2018 2:19 PM   Sebas Lopez is a 53 year old male who presents for:    Chief Complaint   Patient presents with     Oncology Clinic Visit     Return: Palliative      Initial Vitals: /69  Pulse 107  Temp 98.4  F (36.9  C) (Oral)  Resp 18  Ht 1.753 m (5' 9\")  Wt 64.3 kg (141 lb 12 oz)  SpO2 98%  BMI 20.93 kg/m2 Estimated body mass index is 20.93 kg/(m^2) as calculated from the following:    Height as of this encounter: 1.753 m (5' 9\").    Weight as of this encounter: 64.3 kg (141 lb 12 oz). Body surface area is 1.77 meters squared.  No Pain (1) Comment: Data Unavailable   No LMP for male patient.  Allergies reviewed: Yes  Medications reviewed: Yes    Medications: Medication refills not needed today.  Pharmacy name entered into Topic:    [facility] [place] [facility] [address] [facility]    Clinical concerns: new concerns today are that he just was informed that his condition has progressed. Dr. Beckham was notified.    10 minutes for nursing intake (face to face time)     Mike Bridges CMA              "

## 2018-10-04 NOTE — MR AVS SNAPSHOT
After Visit Summary   10/4/2018    Sebas Lopez    MRN: [id number]           Patient Information     Date Of Birth          1965        Visit Information        Provider Department      10/4/2018 1:00 PM Albert Long MD [facility]        Today's Diagnoses     Colon adenocarcinoma (H)        Neoplasm related pain        Peritoneal carcinomatosis (H)        Nausea          Care Instructions    No chemo today    Please think about Regorafenib vs no treatment    Let us know of your decision.    We can determine follow up accordingly              Follow-ups after your visit        Your next 10 appointments already scheduled     Oct 04, 2018  2:00 PM CDT   (Arrive by 1:45 PM)   Return Visit with Lydia Beckham MD   [facility] ([facility])    [address] [place] [address]  [address]  [facility] [address] [phone]            Oct 31, 2018 10:30 AM CDT   [facility] ([facility]    [address] [place] [address]  [address]  [facility] [address] [phone]           Note: this is not an onsite visit; there is no need to come to the facility.              Who to contact     If you have questions or need follow up information about today's clinic visit or your schedule please contact [facility] directly at [phone].  Normal or non-critical lab and imaging results will be communicated to you by MyChart, letter or phone within 4 business days after the clinic has received the results. If you do not hear from us within 7 days, please contact the clinic through MyChart or phone. If you have a critical or abnormal lab result, we will notify you by phone as soon as possible.  Submit refill requests through [facility] or call your pharmacy and they will forward the refill request to us. Please allow 3 business days for your  refill to be completed.          Additional Information About Your Visit        dilitronicshart Information     [employer] gives you secure access to your electronic health record. If you see a primary care provider, you can also send messages to your care team and make appointments. If you have questions, please call your primary care clinic.  If you do not have a primary care provider, please call [phone] and they will assist you.        Care EveryWhere ID     This is your Care EveryWhere ID. This could be used by other organizations to access your [facility] medical records  [id number]        Your Vitals Were     Pulse Temperature Respirations Pulse Oximetry BMI (Body Mass Index)       107 98.4  F (36.9  C) (Oral) 18 98% 20.93 kg/m2        Blood Pressure from Last 3 Encounters:   10/04/18 107/69   09/21/18 105/64   09/07/18 118/75    Weight from Last 3 Encounters:   10/04/18 64.3 kg (141 lb 12.8 oz)   09/21/18 65.1 kg (143 lb 9.6 oz)   09/07/18 64.1 kg (141 lb 4.8 oz)              Today, you had the following     No orders found for display         Where to get your medicines      These medications were sent to [address] [address]     Phone:  [phone]     ondansetron 8 MG ODT tab         Some of these will need a paper prescription and others can be bought over the counter.  Ask your nurse if you have questions.     Bring a paper prescription for each of these medications     fentaNYL 100 mcg/hr 72 hr patch         Information about OPIOIDS     PRESCRIPTION OPIOIDS: WHAT YOU NEED TO KNOW   We gave you an opioid (narcotic) pain medicine. It is important to manage your pain, but opioids are not always the best choice. You should first try all the other options your care team gave you. Take this medicine for as short a time (and as few doses) as possible.    Some activities can increase your pain, such as bandage  changes or therapy sessions. It may help to take your pain medicine 30 to 60 minutes before these activities. Reduce your stress by getting enough sleep, working on hobbies you enjoy and practicing relaxation or meditation. Talk to your care team about ways to manage your pain beyond prescription opioids.    These medicines have risks:    DO NOT drive when on new or higher doses of pain medicine. These medicines can affect your alertness and reaction times, and you could be arrested for driving under the influence (DUI). If you need to use opioids long-term, talk to your care team about driving.    DO NOT operate heavy machinery    DO NOT do any other dangerous activities while taking these medicines.    DO NOT drink any alcohol while taking these medicines.     If the opioid prescribed includes acetaminophen, DO NOT take with any other medicines that contain acetaminophen. Read all labels carefully. Look for the word  acetaminophen  or  Tylenol.  Ask your pharmacist if you have questions or are unsure.    You can get addicted to pain medicines, especially if you have a history of addiction (chemical, alcohol or substance dependence). Talk to your care team about ways to reduce this risk.    All opioids tend to cause constipation. Drink plenty of water and eat foods that have a lot of fiber, such as fruits, vegetables, prune juice, apple juice and high-fiber cereal. Take a laxative (Miralax, milk of magnesia, Colace, Senna) if you don t move your bowels at least every other day. Other side effects include upset stomach, sleepiness, dizziness, throwing up, tolerance (needing more of the medicine to have the same effect), physical dependence and slowed breathing.    Store your pills in a secure place, locked if possible. We will not replace any lost or stolen medicine. If you don t finish your medicine, please throw away (dispose) as directed by your pharmacist. The [place] Pollution Control Agency has more  information about safe disposal: [email or web address]-[email or web address]         Primary Care Provider Office Phone # Fax #    Jaguar Becker [phone]       [facility] [address]  [facility] [address]        Equal Access to Services     [facility] SHAUN : Hadii aad ku hadasho Soomaali, waaxda luqadaha, qaybta kaalmada adeegyada, waxay paigein haynovan adebenita lawrence laginan . So [facility] [phone].    ATENCIÓN: Si habla español, tiene a cid disposición servicios gratuitos de asistencia lingüística. Llame al [phone].    We comply with applicable federal civil rights laws and [place] laws. We do not discriminate on the basis of race, color, national origin, age, disability, sex, sexual orientation, or gender identity.            Thank you!     Thank you for choosing [facility]  for your care. Our goal is always to provide you with excellent care. Hearing back from our patients is one way we can continue to improve our services. Please take a few minutes to complete the written survey that you may receive in the mail after your visit with us. Thank you!             Your Updated Medication List - Protect others around you: Learn how to safely use, store and throw away your medicines at www.disposemymeds.org.          This list is accurate as of 10/4/18  1:52 PM.  Always use your most recent med list.                   Brand Name Dispense Instructions for use Diagnosis    dexamethasone 1 MG tablet    DECADRON    21 tablet    Take 2 tablets (2 mg) by mouth daily for 7 days Then 1 mg daily x 7 days, then stop.    Colon adenocarcinoma (H), Neoplasm related pain       fentaNYL 100 mcg/hr 72 hr patch    DURAGESIC    30 patch    Place 3 patches onto the skin every 72 hours remove old patch.    Colon adenocarcinoma (H), Neoplasm related pain, Peritoneal carcinomatosis (H)       * hydromorphone HCl 500 MG/50ML Soln     25 mL    Dilute 250mg in 250ml NS   0.9mg IV every 10 mins as needed  Dispense 1 bag (250mg = 25ml)    Neoplasm related pain       * hydromorphone HCl 500 MG/50ML Soln     25 mL    Dilute 250mg in 250ml NS  0.9mg IV every 10 mins as needed  Dispense 1 bag (250mg = 25ml)    Neoplasm related pain       * hydromorphone HCl 500 MG/50ML Soln     25 mL    Dilute 250mg in 250ml NS  0.9mg IV every 10 mins as needed  Dispense 1 bag (250mg = 25ml)    Neoplasm related pain       * hydromorphone HCl 500 MG/50ML Soln     25 mL    Dilute 250mg in 250ml NS  0.9mg IV every 10 mins as needed  Dispense 1 bag (250mg = 25ml)    Neoplasm related pain       loperamide 2 MG capsule    IMODIUM     Take 2 mg by mouth as needed for diarrhea        LORazepam 0.5 MG tablet    ATIVAN    30 tablet    Take 1 tablet (0.5 mg) by mouth every 4 hours as needed (Anxiety, Nausea/Vomiting or Sleep)    Peritoneal carcinomatosis (H), Cancer of sigmoid colon (H)       naloxone nasal spray    NARCAN    0.2 mL    Spray 1 spray (4 mg) into one nostril alternating nostrils as needed for opioid reversal every 2-3 minutes until assistance arrives    Cancer of sigmoid colon (H), Long term current use of opiate analgesic       ondansetron 8 MG ODT tab    ZOFRAN-ODT    60 tablet    Take 1 tablet (8 mg) by mouth every 8 hours as needed for nausea    Nausea       order for DME     1 Device    Equipment being ordered: home suction machine with tubing for connection to venting G-tube Treatment Diagnosis: colon adenocarcinoma    Colon adenocarcinoma (H), Small bowel obstruction (H)       pantoprazole 40 MG EC tablet    PROTONIX    30 tablet    Take 1 tablet (40 mg) by mouth every morning (before breakfast)    History of recent steroid use       parenteral nutrition - PTA/DISCHARGE ORDER      Inject into the vein daily [facility]        prochlorperazine 10 MG tablet    COMPAZINE     10 mg every 6 hours as needed        sodium chloride 0.9% Soln BOLUS     1000 mL    Inject 1,000 mLs into the vein daily as needed  For hydration.        * Notice:  This list has 4 medication(s) that are the same as other medications prescribed for you. Read the directions carefully, and ask your doctor or other care provider to review them with you.

## 2018-10-04 NOTE — PATIENT INSTRUCTIONS
No chemo today    Please think about Regorafenib vs no treatment    Let us know of your decision.    We can determine follow up accordingly

## 2018-10-04 NOTE — PROGRESS NOTES
This is a recent snapshot of the patient's [facility] medical record.  For current drug dose and complete information and questions, call [phone] or In Basket pool, [facility] ([id number])  CSN Number:  [id number]

## 2018-10-04 NOTE — PROGRESS NOTES
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 Most recent CT scan showed disease progression and met with Dr. Long who suggested oral chemo with vs hospice.      Reviewed: yes     Interim History:  I met with Brain after he met with Dr. Long and Sebas is in shock that his cancer has progressed. He tells me that in general he is feeling well, pain well controlled and rarely needing PCA. Eating and drinking ok and no sig nausea. Still using TPN and frequent IV fluids and with that, he feels well so because he is feeling so well, he is so surprised by the CT results.  He heard Dr. Long say oral chemo is available but not recommended and recommendation is hospice. Sebas does not want to discuss any of the options at this time as he needs to process with Dr. Long just told him.     Good ostomy output  Draining / venting gtube whenever needing to  Nausea well controlled  Pain very well controlled with current meds and not needing to us PCA very often    Review of Systems:  ROS: 10 point ROS neg other than the symptoms noted above in the HPI         Allergies   Allergen Reactions     Liquid Adhesive Rash     Dermabond, rash with pustules, occurred with abdominal surgery and port removal      Current Outpatient Prescriptions   Medication Sig Dispense Refill     dexamethasone (DECADRON) 1 MG tablet Take 2 tablets (2 mg) by mouth daily for 7 days Then 1 mg daily x 7 days, then stop. 21 tablet 0     fentaNYL (DURAGESIC) 100 mcg/hr 72 hr patch Place 3 patches onto the skin every 72 hours remove old patch. 30 patch 0     hydromorphone HCl 500 MG/50ML SOLN Dilute 250mg  in 250ml NS    0.9mg IV every 10 mins as needed    Dispense 1 bag (250mg = 25ml) 25 mL 0     hydromorphone HCl 500 MG/50ML SOLN Dilute 250mg in 250ml NS    0.9mg IV every 10 mins as needed    Dispense 1 bag (250mg = 25ml) 25 mL 0     hydromorphone HCl 500 MG/50ML SOLN Dilute 250mg in 250ml NS    0.9mg IV every 10 mins as needed    Dispense 1 bag (250mg = 25ml) 25 mL 0     hydromorphone HCl 500 MG/50ML SOLN Dilute 250mg in 250ml NS    0.9mg IV every 10 mins as needed    Dispense 1 bag (250mg = 25ml) 25 mL 0     loperamide (IMODIUM) 2 MG capsule Take 2 mg by mouth as needed for diarrhea       LORazepam (ATIVAN) 0.5 MG tablet Take 1 tablet (0.5 mg) by mouth every 4 hours as needed (Anxiety, Nausea/Vomiting or Sleep) 30 tablet 5     naloxone (NARCAN) nasal spray Spray 1 spray (4 mg) into one nostril alternating nostrils as needed for opioid reversal every 2-3 minutes until assistance arrives 0.2 mL 0     ondansetron (ZOFRAN-ODT) 8 MG ODT tab Take 1 tablet (8 mg) by mouth every 8 hours as needed for nausea 60 tablet 5     order for DME Equipment being ordered: home suction machine with tubing for connection to venting G-tube  Treatment Diagnosis: colon adenocarcinoma 1 Device 0     pantoprazole (PROTONIX) 40 MG EC tablet Take 1 tablet (40 mg) by mouth every morning (before breakfast) 30 tablet 0     parenteral nutrition - PTA/DISCHARGE ORDER Inject into the vein daily [facility]       prochlorperazine (COMPAZINE) 10 MG tablet 10 mg every 6 hours as needed        sodium chloride 0.9% SOLN BOLUS Inject 1,000 mLs into the vein daily as needed For hydration. 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     Ulcerative colitis (H)      Past  "Surgical History:   Procedure Laterality Date     COLONOSCOPY  2017     COLONOSCOPY N/A 11/30/2017    Procedure: COLONOSCOPY;  Colonoscopy;  Surgeon: Rob Dudley MD;  Location: [facility]     COLONOSCOPY N/A 2/6/2018    Procedure: COMBINED COLONOSCOPY, STENT PLACEMENT;  COMBINED COLONOSCOPY with Colonic Stent Placement;  Surgeon: Guru Lionel Mar MD;  Location: [facility]     COLONOSCOPY N/A 3/19/2018    Procedure: COMBINED COLONOSCOPY, STENT PLACEMENT;  flexible sigmoidoscopy with stent placement and dilation;  Surgeon: Alexis Rios MD;  Location: [facility]     COLONOSCOPY N/A 7/5/2018    Procedure: COMBINED COLONOSCOPY, STENT PLACEMENT;  flexible sigmoidoscopy with colonic stent placement ;  Surgeon: Alexis Rios MD;  Location: [facility]     GI SURGERY  07/10/2017    Extensive lysis of adhesions, small bowel resection with end ileostomy.      HERNIA REPAIR       INSERT PORT VASCULAR ACCESS Right 8/10/2017    Procedure: INSERT PORT VASCULAR ACCESS;  Single Lumen Right Chest Power Port;  Surgeon: Malena Andrew PA-C;  [facility]     LAPAROSCOPY DIAGNOSTIC (GENERAL) N/A 12/6/2017    Procedure: LAPAROSCOPY DIAGNOSTIC (GENERAL);  Diagnostic Laparoscopy, Exploratory Laparotomy Anesthesia Block ;  Surgeon: Rob Dudley MD;  Location: [facility]     LAPAROTOMY EXPLORATORY N/A 12/6/2017    Procedure: LAPAROTOMY EXPLORATORY;;  Surgeon: Rob Dudley MD;  Location: [facility]     ORTHOPEDIC SURGERY Left     HIP ARTHROPLASTY     PICC INSERTION Right 02/23/2018    5Fr DL BioFlo PICC, 44cm (3cm external) in the R basilic vein w/ tip in the SVC RA junction         /69  Pulse 107  Temp 98.4  F (36.9  C) (Oral)  Resp 18  Ht 1.753 m (5' 9\")  Wt 64.3 kg (141 lb 12 oz)  SpO2 98%  BMI 20.93 kg/m2    General: well groomed, alert, appears slightly older than stated age, in NAD  Eyes: EOMI, sclera clear  HEENT: NC/AT; mucous membranes moist  Lungs:  No increased work " of breathing, speaking full sentences   Abdomen: ostomy bag in place, abdomen soft  Neuro: A&O x 3; CN II-XII grossly intact; gait normal  Neuropsych: alert, good eye contact, engaged, pleasant, sensorium intact      Key Data Reviewed:  10/4 CT scan:   IMPRESSION:   1. Interval percutaneous gastrostomy tube placement with gastric  decompression and improvement in previously seen small bowel  dilatation.   2. Overall evidence of disease progression with increase in extensive  carcinomatosis and peritoneal tumor burden as well as enlargement of  mesenteric and retroperitoneal lymph nodes. Tumor extension along the liver and subcutaneous soft tissues is also mildly increased. Marked flattening of the infrarenal IVC with adjacent tumor.   3. Overlapping stents are present within the rectum and sigmoid colon, similar in configuration to the prior exam with persistent narrowing of the lumen and intraluminal opacification. Persistent diffuse bowel wall thickening and hyperemia compatible with ongoing  inflammation/infection.  4. New geographic groundglass opacity in the inferomedial left upper  lobe, and tiny subpleural groundglass nodule in the inferomedial right  upper lobe may be infectious/inflammatory. Recommend continued  attention on follow-up imaging. Multiple enlarged right greater than  left cardiophrenic lymph nodes, increased from prior.      Impression:     53 yr old male with progressive, metastatic colon cancer with disease progression noted on most recent scans despite completion of cycle 3 of irinotecan and panitumumab.     Recommendations & Counseling:    Symptom management:     Pain: well controlled  - continue PCA at current dose (0.9mg IV boluses q10 min PRN) - Sebas states he hasn't been needing many bolus doing which is why it is hard for him to believe his disease has progressed.   - continue fentanyl 100mcg patch q72 hrs    Nausea - well controlled. Continue current meds PRN    Goals of care: Sebas  just got news today of disease progression and that while he could try oral chemo with Regorafenib, although according to Sebas, Dr. Long is not optimistic that this will help control the cancer and could have many side effects and therefore recommended hospice.  Sebas needs to think about what next steps should be and let the information he just heard from Dr. Long sink in. Sebas is feeling so well that this news was even more of a shock to him. I offered an informational interview with hospice as a way to gather information but Sebas wanted to think about everything. He will follow up with me in 3-4 weeks.     Thank you for involving us in the patient's care. 30 minutes face time over half spent counseling.  Lydia Beckham MD / Palliative Medicine / Pager [phone] / After-Hours Answering Service [phone] / Main Palliative Clinic - [phone] / [facility] Inpatient Team Consult Pager [phone] (answered 8am-430pm M-F)

## 2018-10-04 NOTE — PROGRESS NOTES
Via Oncology handout on Regorafenib.  Sebas will review the medication and let us know his decision on pursuing further treatment vs no treatment.

## 2018-10-04 NOTE — MR AVS SNAPSHOT
After Visit Summary   10/4/2018    Sebas Lopez    MRN: [id number]           Patient Information     Date Of Birth          1965        Visit Information        Provider Department      10/4/2018 2:00 PM Lydia Beckham MD [facility]        Today's Diagnoses     Colon adenocarcinoma (H)    -  1    Neoplasm related pain        Nausea        Anorexia           Follow-ups after your visit        Your next 10 appointments already scheduled     Oct 31, 2018 10:30 AM CDT   TELEMEDICINE with Sherry Coronel RPH   [facility] Medication Therapy Management ([facility])    [address]  [address]  [place] [address] [phone]           Note: this is not an onsite visit; there is no need to come to the facility.              Who to contact     If you have questions or need follow up information about today's clinic visit or your schedule please contact [facility] directly at [phone].  Normal or non-critical lab and imaging results will be communicated to you by [email or web address], letter or phone within 4 business days after the clinic has received the results. If you do not hear from us within 7 days, please contact the clinic through [employer]br[employer] or phone. If you have a critical or abnormal lab result, we will notify you by phone as soon as possible.  Submit refill requests through [employer] or call your pharmacy and they will forward the refill request to us. Please allow 3 business days for your refill to be completed.          Additional Information About Your Visit        MyChart Information     [employer] gives you secure access to your electronic health record. If you see a primary care provider, you can also send messages to your care team and make appointments. If you have questions, please call your primary care clinic.  If you do not have a primary care provider, please call [phone] and they will assist you.       "  Care EveryWhere ID     This is your Care EveryWhere ID. This could be used by other organizations to access your [facility] medical records  [id number]        Your Vitals Were     Pulse Temperature Respirations Height Pulse Oximetry BMI (Body Mass Index)    107 98.4  F (36.9  C) (Oral) 18 1.753 m (5' 9\") 98% 20.93 kg/m2       Blood Pressure from Last 3 Encounters:   10/04/18 107/69   10/04/18 107/69   09/21/18 105/64    Weight from Last 3 Encounters:   10/04/18 64.3 kg (141 lb 12 oz)   10/04/18 64.3 kg (141 lb 12.8 oz)   09/21/18 65.1 kg (143 lb 9.6 oz)              Today, you had the following     No orders found for display         Where to get your medicines      These medications were sent to [place] [id number]     Phone:  [phone]     ondansetron 8 MG ODT tab         Some of these will need a paper prescription and others can be bought over the counter.  Ask your nurse if you have questions.     Bring a paper prescription for each of these medications     fentaNYL 100 mcg/hr 72 hr patch          Primary Care Provider Office Phone # Fax #    Jaguar Becker [phone]       [place] [facility] [address] [facility] [address]        Equal Access to Services     [facility] : Hadii karrie reddy hadasho Soshu, waaxda luqadaha, qaybta kaalmada bhargav saucedo . So [facility] [phone].    ATENCIÓN: Si habla español, tiene a cid disposición servicios gratuitos de asistencia lingüística. Llame al [phone].    We comply with applicable federal civil rights laws and [place] laws. We do not discriminate on the basis of race, color, national origin, age, disability, sex, sexual orientation, or gender identity.            Thank you!     Thank you for choosing [facility]  for your care. Our goal is always to provide you with excellent care. " Hearing back from our patients is one way we can continue to improve our services. Please take a few minutes to complete the written survey that you may receive in the mail after your visit with us. Thank you!             Your Updated Medication List - Protect others around you: Learn how to safely use, store and throw away your medicines at www.disposemymeds.org.          This list is accurate as of 10/4/18 11:59 PM.  Always use your most recent med list.                   Brand Name Dispense Instructions for use Diagnosis    * dexamethasone 4 MG tablet    DECADRON          * dexamethasone 1 MG tablet    DECADRON    21 tablet    Take 2 tablets (2 mg) by mouth daily for 7 days Then 1 mg daily x 7 days, then stop.    Colon adenocarcinoma (H), Neoplasm related pain       * dexamethasone 1 MG tablet    DECADRON          fentaNYL 100 mcg/hr 72 hr patch    DURAGESIC    30 patch    Place 3 patches onto the skin every 72 hours remove old patch.    Colon adenocarcinoma (H), Neoplasm related pain, Peritoneal carcinomatosis (H)       loperamide 2 MG capsule    IMODIUM     Take 2 mg by mouth as needed for diarrhea        LORazepam 0.5 MG tablet    ATIVAN    30 tablet    Take 1 tablet (0.5 mg) by mouth every 4 hours as needed (Anxiety, Nausea/Vomiting or Sleep)    Peritoneal carcinomatosis (H), Cancer of sigmoid colon (H)       naloxone nasal spray    NARCAN    0.2 mL    Spray 1 spray (4 mg) into one nostril alternating nostrils as needed for opioid reversal every 2-3 minutes until assistance arrives    Cancer of sigmoid colon (H), Long term current use of opiate analgesic       ondansetron 8 MG ODT tab    ZOFRAN-ODT    60 tablet    Take 1 tablet (8 mg) by mouth every 8 hours as needed for nausea    Nausea       order for DME     1 Device    Equipment being ordered: home suction machine with tubing for connection to venting G-tube Treatment Diagnosis: colon adenocarcinoma    Colon adenocarcinoma (H), Small bowel obstruction (H)        pantoprazole 40 MG EC tablet    PROTONIX    30 tablet    Take 1 tablet (40 mg) by mouth every morning (before breakfast)    History of recent steroid use       parenteral nutrition - PTA/DISCHARGE ORDER      Inject into the vein daily [facility]        prochlorperazine 10 MG tablet    COMPAZINE     10 mg every 6 hours as needed        sodium chloride 0.9% Soln BOLUS     1000 mL    Inject 1,000 mLs into the vein daily as needed For hydration.        * Notice:  This list has 3 medication(s) that are the same as other medications prescribed for you. Read the directions carefully, and ask your doctor or other care provider to review them with you.

## 2018-10-04 NOTE — LETTER
10/4/2018       RE: Sebas Lopez  [address] [place] [address]     Dear Colleague,    Thank you for referring your patient, Sebas Lopez, to the [facility]. Please see a copy of my visit note below.    Oncology follow up visit:  Date on this visit: 10/4/2018         CC  sigmoid adenocarcinoma with peritoneal carcinomatosis- MSI- Intact KRAS wild type    Primary Physician: Wayoln Han     History Of Present Illness:     Please see previous note for details. I have copied and updated from prior notes.   Sebas is a 53-year-old male who has a history of ulcerative colitis diagnosed more than 20 years ago, but he has not had medical management for that.  He was doing well, but in 05/2017 he presented with a few weeks of abdominal pain.  At that time, his imaging showed that he had a sigmoid wall thickening with adjacent mesenteric lymphadenopathy and free fluid near the abdominal wall mesh from his prior hernia surgery.  He subsequently underwent a colonoscopy which was incomplete and showed an obstructing sigmoid colon mass.  The biopsy from the sigmoid mass showed sigmoid adenocarcinoma.  He then developed obstructive symptoms and underwent exploratory laparotomy on 07/10/2017.  During that he was found to have diffuse peritoneal carcinomatosis.  Extensive lysis of adhesions was performed and a small bowel resection was performed with end ileostomy.  The biopsies from the multiple large peritoneal metastasis also were positive for metastatic adenocarcinoma.      He started palliative FOLFOX on 8/11/17. C#6 given on 10/26/17  After 6 cycles, he has stable disease    On 12/6/17, he had Diagnostic laparoscopy and Exploratory laparotomy, but HIPEC was not performed as Peritoneal cancer index was 26 with diffuse involvement of the small bowel as well as the small bowel mesentery.     He then presented to ED on 1/26 with abdominal pain and associated nausea. CT A/P on 1/26  with 5.2 x 4.5 x 3.6 cm proximal sigmoid mass causnig colonic obstriction with singificant distention of cecum (7cm in diameter), ascending colon and proximal transverse colon. No pneumatosis or pneumoperitoneum. Also small volume of ascites with associated findings concerning for peritoneal carcinomatosis, increased from prior studies. Dr. Dudley was consulted and recommended no surgical intervention.    He was then seen in clinic on 1/29/2018 as well as yesterday because for the last few days he was unable to tolerate solid foods and he was getting abdominal cramping and some nausea. He also noticed that his ostomy output decreased. He was given IV fluids as well as antibiotics and yesterday he was started on OxyContin 10 mg twice a day along with p.r.n. oxycodone. Of note a few weeks ago he was started on short acting octreotide to decrease the ostomy output but he stopped taking it about one week ago and few days prior to that he noticed worsening of the abdominal cramping.      Repeat CT scan showed worsening peritoneal carcinomatosis and he is getting more symptomatic in terms of worsening abdominal pain and difficulty eating because of worsening abdominal cramping. He was admitted to the hospital with worsening colon distention and colon stent was placed which improved his symptoms.     He was recently hospitalized from 2/20-2/25/18 with presumed infectious colitis and bacteremia. His port was removed. He was discharged home on IV vancomycin via a PICC line which he completed on 3/7/2018.     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 He was hospitalized with a bowel obstruction from 3/18-3/20/18 and a colonic stent was placed on 3/19/18.  C#8 3/22/18 FOLFOX  C#9 4/5/18 FOLFOX      Repeat CT shows slightly improved disease and less ascites now.   Because of progressive fatigue, we stopped the 5-FU bolus and leucovorin and continued with the 5-FU infusion and  oxaliplatin.     C#10 5/4/18  C#11 5/18/18  C#13 5/31/18  C#13 6/15/18    Repeat CT scan showed worsening peritoneal disease. There is also increased fluid in the proximal control on as well as post stent fluid in the rectum. This could be consistent with colitis  I gave him levofloxacin/flagyl and plan was to switch therapy to irinotecan and panitumumab though was hospitalized on 7/3 for abdominal pain and bloating as well as rectal bleeding. Found to have colitis with increased fluid dilation of the colon. GI did a flex sig and placed a colon stent for the third time. Other stents were found to have ingrowth of tumor.   Again admitted on 7/24 with SBO and PAULINA. Venting G tube placed which helped with N/V.      He initiated Irinotecan and Panitumumab on 8/17/18.  Cycle #2 was delayed because of dehydration and Acute kidney injury and hyponatremia and weight loss.  Cycle #2 on 9/7/2018  Cycle #3 on 9/21/2018    Repeat CT scan done today shows progression of the disease with worsening peritoneal carcinomatosis as well as increase in the size of the primary tumor.  This is a preliminary report which I discussed with the radiologist.    He tells me that overall he is doing okay but he feels tired and is resting most of the time.  He had nausea for few days for which she is taking Zofran.  He is trying to eat soft food and is taking 64 ounces per day.  Ostomy is working well.  He has to drain through the venting G-tube on average once a day.  He is supplementing his fluids by taking 1500 cc of IV fluids daily.  He continues to be on TPN.  Overall his pain is under decent control with fentanyl patch and Dilaudid PCA.  He can swallow pills but occasionally he notices that he can see the pill in his ostomy.  Denies new swellings.  No neuropathy.  No serious infections.  No new skin rashes.      ECOG 2    ROS:  Otherwise a comprehensive review of the system was essentially unremarkable        I reviewed her history in Epic  as below      Past Medical/Surgical History:  Past Medical History:   Diagnosis Date     Adenocarcinoma of sigmoid colon (H)     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     Ulcerative colitis (H)      Past Surgical History:   Procedure Laterality Date     COLONOSCOPY  2017     COLONOSCOPY N/A 11/30/2017    Procedure: COLONOSCOPY;  Colonoscopy;  Surgeon: Rob Dudley MD;  Location: [facility]     COLONOSCOPY N/A 2/6/2018    Procedure: COMBINED COLONOSCOPY, STENT PLACEMENT;  COMBINED COLONOSCOPY with Colonic Stent Placement;  Surgeon: Guru Lionel Mar MD;  Location: [facility]     COLONOSCOPY N/A 3/19/2018    Procedure: COMBINED COLONOSCOPY, STENT PLACEMENT;  flexible sigmoidoscopy with stent placement and dilation;  Surgeon: Alexis Rios MD;  Location: [facility]     COLONOSCOPY N/A 7/5/2018    Procedure: COMBINED COLONOSCOPY, STENT PLACEMENT;  flexible sigmoidoscopy with colonic stent placement ;  Surgeon: Alexis Rios MD;  Location: [facility]     GI SURGERY  07/10/2017    Extensive lysis of adhesions, small bowel resection with end ileostomy.      HERNIA REPAIR       INSERT PORT VASCULAR ACCESS Right 8/10/2017    Procedure: INSERT PORT VASCULAR ACCESS;  Single Lumen Right Chest Power Port;  Surgeon: Malena Andrew PA-C;  [facility]: [facility]     LAPAROSCOPY DIAGNOSTIC (GENERAL) N/A 12/6/2017    Procedure: LAPAROSCOPY DIAGNOSTIC (GENERAL);  Diagnostic Laparoscopy, Exploratory Laparotomy Anesthesia Block ;  Surgeon: Rob Dudley MD;  Location: [facility]     LAPAROTOMY EXPLORATORY N/A 12/6/2017    Procedure: LAPAROTOMY EXPLORATORY;;  Surgeon: Rob Dudley MD;  Location: [facility]     ORTHOPEDIC SURGERY Left     HIP ARTHROPLASTY     PICC INSERTION Right 02/23/2018    5Fr  DL BioFlo PICC, 44cm (3cm external) in the R basilic vein w/ tip in the SVC RA junction      Ulcerative colitis.  Left hip replacement.       Cancer History:   As above    Allergies:  Allergies as of 10/04/2018 - Julius as Reviewed 10/04/2018   Allergen Reaction Noted     Liquid adhesive Rash 03/01/2018     Current Medications:  Current Outpatient Prescriptions   Medication Sig Dispense Refill     fentaNYL (DURAGESIC) 100 mcg/hr 72 hr patch Place 3 patches onto the skin every 72 hours remove old patch. 30 patch 0     hydromorphone HCl 500 MG/50ML SOLN Dilute 250mg in 250ml NS    0.9mg IV every 10 mins as needed    Dispense 1 bag (250mg = 25ml) 25 mL 0     hydromorphone HCl 500 MG/50ML SOLN Dilute 250mg in 250ml NS    0.9mg IV every 10 mins as needed    Dispense 1 bag (250mg = 25ml) 25 mL 0     hydromorphone HCl 500 MG/50ML SOLN Dilute 250mg in 250ml NS    0.9mg IV every 10 mins as needed    Dispense 1 bag (250mg = 25ml) 25 mL 0     hydromorphone HCl 500 MG/50ML SOLN Dilute 250mg in 250ml NS    0.9mg IV every 10 mins as needed    Dispense 1 bag (250mg = 25ml) 25 mL 0     loperamide (IMODIUM) 2 MG capsule Take 2 mg by mouth as needed for diarrhea       LORazepam (ATIVAN) 0.5 MG tablet Take 1 tablet (0.5 mg) by mouth every 4 hours as needed (Anxiety, Nausea/Vomiting or Sleep) 30 tablet 5     naloxone (NARCAN) nasal spray Spray 1 spray (4 mg) into one nostril alternating nostrils as needed for opioid reversal every 2-3 minutes until assistance arrives 0.2 mL 0     ondansetron (ZOFRAN-ODT) 8 MG ODT tab Take 1 tablet (8 mg) by mouth every 8 hours as needed for nausea 60 tablet 5     order for DME Equipment being ordered: home suction machine with tubing for connection to venting G-tube  Treatment Diagnosis: colon adenocarcinoma 1 Device 0     pantoprazole (PROTONIX) 40 MG EC tablet Take 1 tablet (40 mg) by mouth every morning (before breakfast) 30 tablet 0     parenteral nutrition - PTA/DISCHARGE ORDER Inject into the vein  daily [facility]       prochlorperazine (COMPAZINE) 10 MG tablet 10 mg every 6 hours as needed        sodium chloride 0.9% SOLN BOLUS Inject 1,000 mLs into the vein daily as needed For hydration. 1000 mL 0     dexamethasone (DECADRON) 1 MG tablet Take 2 tablets (2 mg) by mouth daily for 7 days Then 1 mg daily x 7 days, then stop. 21 tablet 0      Family History:  Family History   Problem Relation Age of Onset     Hypertension Father      Diabetes Father       No family history of cancer.  He does not have any kids.       Social History:  Social History     Social History     Marital status: Single     Spouse name: N/A     Number of children: N/A     Years of education: N/A     Occupational History     Not on file.     Social History Main Topics     Smoking status: Never Smoker     Smokeless tobacco: Never Used     Alcohol use 3.0 oz/week     5 Cans of beer per week      Comment: none for last 4 months     Drug use: No     Sexual activity: Not on file     Other Topics Concern     Not on file     Social History Narrative   He does not smoke and does not drink.  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  He lives with his girlfriend, Bessie.       Physical Exam:  /69 (BP Location: Left arm, Patient Position: Sitting, Cuff Size: Adult Regular)  Pulse 107  Temp 98.4  F (36.9  C) (Oral)  Resp 18  Wt 64.3 kg (141 lb 12.8 oz)  SpO2 98%  BMI 20.93 kg/m2  CONSTITUTIONAL: no acute distress  EYES: PERRLA, there is mild pallor but no icterus  ENT/MOUTH: no mouth lesions. Ears normal  CVS: s1s2 no m r g .   RESPIRATORY: clear to auscultation b/l  GI: Abdomen is firm and is not particularly tender.  Ostomy site is clean.  He has had liquidy stool.  Venting G-tube site is clean  NEURO: AAOX3  Grossly non focal neuro exam  INTEGUMENT: no obvious rashes  LYMPHATIC: no palpable cervical, supraclavicular, axillary LAD  MUSCULOSKELETAL: Unremarkable. No bony tenderness.   EXTREMITIES: no edema  PSYCH: Mentation, mood and  affect are normal. Decision making capacity is intact            Laboratory/Imaging Studies    Results for KARINA MINER (MRN [id number]) as of 10/4/2018 11:58   Ref. Range 10/4/2018 10:36   Sodium Latest Ref Range: 133 - 144 mmol/L 126 (L)   Potassium Latest Ref Range: 3.4 - 5.3 mmol/L 3.6   Chloride Latest Ref Range: 94 - 109 mmol/L 87 (L)   Carbon Dioxide Latest Ref Range: 20 - 32 mmol/L 28   Urea Nitrogen Latest Ref Range: 7 - 30 mg/dL 25   Creatinine Latest Ref Range: 0.66 - 1.25 mg/dL 0.69   GFR Estimate Latest Ref Range: >60 mL/min/1.7m2 >90   GFR Estimate If Black Latest Ref Range: >60 mL/min/1.7m2 >90   Calcium Latest Ref Range: 8.5 - 10.1 mg/dL 8.3 (L)   Anion Gap Latest Ref Range: 3 - 14 mmol/L 11   Magnesium Latest Ref Range: 1.6 - 2.3 mg/dL 1.8   Albumin Latest Ref Range: 3.4 - 5.0 g/dL 1.9 (L)   Protein Total Latest Ref Range: 6.8 - 8.8 g/dL 6.9   Bilirubin Total Latest Ref Range: 0.2 - 1.3 mg/dL 0.2   Alkaline Phosphatase Latest Ref Range: 40 - 150 U/L 159 (H)   ALT Latest Ref Range: 0 - 70 U/L 17   AST Latest Ref Range: 0 - 45 U/L 16   Glucose Latest Ref Range: 70 - 99 mg/dL 121 (H)   WBC Latest Ref Range: 4.0 - 11.0 10e9/L 13.0 (H)   Hemoglobin Latest Ref Range: 13.3 - 17.7 g/dL 8.3 (L)   Hematocrit Latest Ref Range: 40.0 - 53.0 % 26.5 (L)   Platelet Count Latest Ref Range: 150 - 450 10e9/L 612 (H)   RBC Count Latest Ref Range: 4.4 - 5.9 10e12/L 3.21 (L)   MCV Latest Ref Range: 78 - 100 fl 83   MCH Latest Ref Range: 26.5 - 33.0 pg 25.9 (L)   MCHC Latest Ref Range: 31.5 - 36.5 g/dL 31.3 (L)   RDW Latest Ref Range: 10.0 - 15.0 % 18.2 (H)   Diff Method Unknown Manual Differential   % Neutrophils Latest Units: % 52.5   % Lymphocytes Latest Units: % 14.9   % Monocytes Latest Units: % 15.8   % Eosinophils Latest Units: % 0.9   % Basophils Latest Units: % 1.8   % Metamyelocytes Latest Units: % 4.4   % Myelocytes Latest Units: % 8.8   % Promyelocytes Latest Units: % 0.9   Nucleated RBCs Latest Ref  Range: 0 /100 1 (H)   Absolute Neutrophil Latest Ref Range: 1.6 - 8.3 10e9/L 6.8   Absolute Lymphocytes Latest Ref Range: 0.8 - 5.3 10e9/L 1.9   Absolute Monocytes Latest Ref Range: 0.0 - 1.3 10e9/L 2.1 (H)   Absolute Eosinophils Latest Ref Range: 0.0 - 0.7 10e9/L 0.1   Absolute Basophils Latest Ref Range: 0.0 - 0.2 10e9/L 0.2   Absolute Metamyelocytes Latest Ref Range: 0 10e9/L 0.6 (H)   Absolute Myelocytes Latest Ref Range: 0 10e9/L 1.1 (H)   Absolute Promyelocytes Latest Ref Range: 0 10e9/L 0.1 (H)   Absolute Nucleated RBC Unknown 0.1           EXAMINATION: CT ABDOMEN PELVIS W/O CONTRAST, 7/24/2018 4:27 PM     TECHNIQUE:  Helical CT images from the thoracic inlet through the  symphysis pubis were obtained  with contrast.     CONTRAST DOSE: Only oral contrast was used for this study     COMPARISON: CT 7/18/2018, 6/27/2018, 5/3/2018     HISTORY: 52 yo male with stage IV sigmoid adenocarcinoma with  peritoneal metastasis; Cancer of sigmoid colon (H)     FINDINGS:     Chest:   Mild bibasilar atelectasis.     Abdomen and pelvis:  Limited evaluation secondary to lack of IV contrast.     Again seen implants along the hepatic capsule. No focal hepatic  lesion. Unremarkable gallbladder. The spleen and adrenals are within  normal limits. Atrophic pancreas. Unchanged noncontrast appearance of  the exophytic left renal cysts.No hydronephrosis or renal calculus.  Unremarkable urinary bladder.     Redemonstration of extensive peritoneal carcinomatosis, which appears  to have not significantly changed since the CT 7/3/2018, fine details  are difficult to discern on this noncontrast study.     Again seen two in situ colonic stent. Interval placement of new third  stent which predominantly is in the sigmoid colon. Postsurgical  changes of distal small bowel resection in the right mid abdomen and  right lower quadrant ileostomy.      Interval increased dilatation of the stomach and proximal small bowel  which measure up to 5 cm.  Decompressed loops of bowel in the pelvis,  best appreciated on the coronal images. The transition point is left  lower quadrant  difficult to exactly identify secondary to lack of IV  contrast.      Decreased fluid in the colon.      No ascites. No pneumatosis, portal venous gas or free air. No  abdominopelvic lymphadenopathy.     Bones and soft tissues:  Postsurgical changes of left total hip arthroplasty. Lucent foci in  the iliac bones. Chronic deformities is of the multiple left ribs.          IMPRESSION:   In this patient with history of sigmoid adenocarcinoma with extensive  peritoneal carcinomatosis.  1. New dilated loops of small bowel measuring up to 5 cm with upstream  fluid retention in the stomach. The transition point is in the left  lower quadrant. Precise anatomy is difficult to discern due to lack of  IV contrast. Findings consistent with small bowel obstruction. No  evidence of perforation or significant mesenteric stranding.  2. New third colonic stent on the already existing colonic stents.  Decreased fluid in the colon.  3. Poorly characterized, not significantly changed sigmoid colon mass  and peritoneal carcinomatosis.         EXAMINATION: CT CHEST/ABDOMEN/PELVIS W CONTRAST, 6/27/2018 11:19 AM     TECHNIQUE:  Helical CT images from the thoracic inlet through the  symphysis pubis were obtained  with contrast.     CONTRAST DOSE: Isovue 370  95 mls     COMPARISON: CT 5/3/2018, 2/20/2018, 2/5/2018     HISTORY: follow up of colon cancer; Cancer of sigmoid colon (H)     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 Status  post expiratory laparotomy and and ileostomy along with small bowel  resection, on FOLFOX palliative chemotherapy,  FINDINGS:     Chest:   Right arm PICC tip projects at the caval atrial junction.     Heart size is within normal limits. No pericardial effusion. The  pulmonary artery and thoracic aorta are within normal limits.      No mediastinal and perihilar  lymphadenopathy.      Central tracheobronchial tree is patent. Unchanged tiny right upper  lobe calcified nodule (4 image 27). No pleural effusion or  pneumothorax.     Abdomen and pelvis:  No significantly changed hepatic scalloping measuring approximately 7  mm in thickness along the anterior aspect (series 6 image 98).  Unremarkable gallbladder.  The spleen and adrenals are within normal  limits. Unremarkable pancreas. Unchanged bilateral renal cysts. No  hydronephrosis or renal calculus. Unremarkable urinary bladder.     Again seen sigmoid colonic stent. Increased proximal colonic fluid   with colon measuring up to 6 cm in maximal diameter . Increased post  stent fluid in the rectum as well. Partial small bowel resection with  right lower quadrant ileostomy. Again seen wall thickening involving  the colon. No significantly changed small bowel thickening     Slight interval increase in extent of diffuse peritoneal disease with  extensive peritoneal implants, for example near complete obliteration  of the pelvic fat planes on the series 3 image 490, and 436. Lower  anterior confluent ill-defined peritoneal masslike thickening measures  approximately 16.9 x 4.8 cm in maximal diameter (6 image 66).  Redemonstration of the sigmoid colon encasement which is slightly  increased. Again seen omental caking  on deep aspect of the anterior  abdominal mesh, slightly increased.     Bones and soft tissues:  Degenerative changes of the lumbar spine. No suspicious bone lesion is  seen. Old healed right clavicle fracture. Small sclerotic focus in the  posterior lateral right sixth rib, unchanged. Left hip total  arthroplasty. Unchanged lucent focus involving the left iliac bone         IMPRESSION:   In this patient with known history of extensive peritoneal  carcinomatosis associated with sigmoid adenocarcinoma:  1a. Status post sigmoid stent. Increased colonic and rectal fluid  since CT 5/3/2018, favored to represent  colitis.  1b. Redemonstration of extensive peritoneal carcinomatosis with near  obliteration of pelvic fat planes. Overall disease has slightly  progressed.  1c. No significant change hepatic scalloping.  2. No evidence of metastasis in the chest.        NGS PANEL COLORECTAL    No mutations were identified in analyzed regions of KRAS, NRAS, BRAF, HRAS, or PIK3CA.       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  The sigmoid carcinoma is obstructing, requiring exploratory laparotomy and end ileostomy along with small bowel resection.    MSI intact and NGS panel does not reveal any identifiable mutations     He has been started on FOLFOX palliative chemotherapy. After 6 cycles, he had stable disease      On 12/6/17, he had Diagnostic laparoscopy and Exploratory laparotomy, but HIPEC was not performed as Peritoneal cancer index was 26 with diffuse involvement of the small bowel as well as the small bowel mesentery.     Repeat CT scan showed worsening peritoneal carcinomatosis and he was getting more symptomatic in terms of worsening abdominal pain and difficulty eating because of worsening abdominal cramping. He was admitted to the hospital with worsening colon distention and colon stent was placed which improved his symptoms.     He was hospitalized from 2/20-2/25/18 with presumed infectious colitis and bacteremia. His port was removed. He was discharged home on IV vancomycin via PICC line. He completed vancomycin on 3/7/2018.    He resumed FOLFOX (C#7) with dose reduced oxaliplatin (70mg/m2) due to previous neuropathy on 3/8/18. He was hospitalized with a bowel obstruction from 3/18-3/20/18 and a colonic stent was placed on 3/19/18.  C#8 3/22/18 FOLFOX  C#9 4/5/18 FOLFOX        Repeat CT shows slightly improved disease and less ascites now.   Because of progressive fatigue, we stopped the 5-FU bolus and leucovorin and continued with the 5-FU infusion and oxaliplatin.      C#10 5/4/18  C#11 5/18/18  C#13 5/31/18  C#13 6/15/18    Repeat CT scan showed worsening peritoneal disease. There is also increased fluid in the proximal control on as well as post stent fluid in the rectum. This could be consistent with colitis, so I gave him levofloxacin/flagyl and plan was to switch therapy to irinotecan and panitumumab though it has not been started as he was hospitalized on 7/3 for abdominal pain and bloating as well as rectal bleeding. Found to have colitis with increased fluid dilation of the colon. GI did a flex sig and placed a colon stent for the third time. Other stents were found to have ingrowth of tumor.   Again admitted on 7/24 with SBO and PAULINA. Venting G tube placed which helped with N/V.     He initiated Irinotecan and Panitumumab on 8/17/18.  Cycle #2 was delayed because of dehydration and Acute kidney injury and hyponatremia and weight loss.  Cycle #2 on 9/7/2018  Cycle #3 on 9/21/2018    I reviewed his CT scan which was done today with the radiologist and there is definite progression with worsening peritoneal carcinomatosis as well as worsening primary disease of the colon.  Final report is pending.  I discussed the situation with Sebas in detail.  We discussed that now his options are very limited.  I do not want to use a Avastin because he is at a high risk of gut perforation.  We discussed possibility of trying Regorafenib although there is a chance that there would be poor absorption of the medication and we also discussed the possibility of side effects.  We also discussed that the chances of this working is low but we can consider it.  We discussed the rational schedule and potential side effects of it in detail.  He was also given a handout on it.    We also briefly discussed Lonsurf although chances of it working would also be low    We also discussed the other possibility of not trying further anticancer directed therapy and focusing all our effort in making and  keeping him as comfortable as possible and going with the hospice approach.    He wants to think about it before making a final decision and I believe it is reasonable.  He will let us know when he has decided one way or the other.    Hyponatremia and dehydration.  Sodium is 126.  He should continue supplementing with IV fluids 1500 cc daily.  I also encouraged him to drink plenty of fluids.  Renal functions are stable    Abdominal pain.  I refilled fentanyl patch today.  He will continue Dilaudid PCA.  He will follow with palliative care.    Nausea.  I refilled Zofran today.    Anemia due to chemotherapy as well as anemia of chronic disease.  Iron studies in August showed anemia of chronic disease.    Once he has decided we will determine the follow-up accordingly.    I answered all of his questions to his satisfaction.  He is agreeable with the plan    Albert Long

## 2018-10-04 NOTE — NURSING NOTE
"Oncology Rooming Note    October 4, 2018 11:34 AM   Sebas Lopez is a 53 year old male who presents for:    Chief Complaint   Patient presents with     Blood Draw     Labs drawn via PICC lines, flushed and saline locked, VS taken     Oncology Clinic Visit     Return for Colon Ca , CT, Labs      Initial Vitals: /69 (BP Location: Left arm, Patient Position: Sitting, Cuff Size: Adult Regular)  Pulse 107  Temp 98.4  F (36.9  C) (Oral)  Resp 18  Wt 64.3 kg (141 lb 12.8 oz)  SpO2 98%  BMI 20.93 kg/m2 Estimated body mass index is 20.93 kg/(m^2) as calculated from the following:    Height as of 9/21/18: 1.753 m (5' 9.02\").    Weight as of this encounter: 64.3 kg (141 lb 12.8 oz). Body surface area is 1.77 meters squared.  No Pain (1) Comment: Data Unavailable   No LMP for male patient.  Allergies reviewed: Yes  Medications reviewed: Yes    Medications: MEDICATION REFILLS NEEDED TODAY. Provider was notified.  Pharmacy name entered into [facility]:    [facility] - [place] - [place], [place] - [address] [place] [address] HOME INFUSION    Clinical concerns: Refill Zofran, Fentanyl 100 Mcg   Ethan was notified.    6  minutes for nursing intake (face to face time)     Meche Larry MA              "

## 2018-10-05 NOTE — TELEPHONE ENCOUNTER
Received fax from [facility] [facility] [employer]  requesting refills of patient's PCA hydromorphone.     Last approved script in Epic: 10/4/2018  Last office visit: 10/4/2018     Will route request to MD for review.     Reviewed [place]  Report.

## 2018-10-12 NOTE — PROGRESS NOTES
This is a recent snapshot of the patient's [facility] medical record.  For current drug dose and complete information and questions, call [phone] or In Basket pool, fv home infusion ([id number])  CSN Number:  [id number]

## 2018-10-17 NOTE — PROGRESS NOTES
Spoke with Brain to discuss his decision on further treatment with oral chemotherapy vs no more treatment.  While in patients  noticed new labs that were drawn locally in the chart. After reviewing the results it was notable for a WBC of 24.3 and Hgb of 7.3.  Sebas endorses having a temp over the last few days with a Tmax of 100.00 and a sore throat.   Discussed his lab results from yesterday and writers concern that he could have an infection secondary to having his port continuously accessed for TPN.  Writer recommenced he be seen locally either at his PCP, urgent care or the ER. Discussed that he at minimum needs to be seen to have a work  upthat included Blood culture from his port and arm, CBC w/diff and chest xray. Stressed the importance of not waiting until tomorrow.   At this time Sebas does not want to pursue any further treatment.   Discussed the option to continuing with palliative care vs hospice at this time. At this time he would like to continue with palliative care and understands that they can help him enroll into hospice when the time is right.    Discussed that he can request a follow up with Dr Long at anytime if he changes his mind about treatment.  Sebas verbalized understanding of all the above and agrees to be seen by a medical professional today.  Encouraged him to call with any additional questions or concerns.

## 2018-10-19 PROBLEM — J18.9 PNEUMONIA OF LEFT LOWER LOBE DUE TO INFECTIOUS ORGANISM: Status: ACTIVE | Noted: 2018-01-01

## 2018-10-19 NOTE — IP AVS SNAPSHOT
"    UNIT [address] [facility]: [phone]                                              INTERAGENCY TRANSFER FORM - PHYSICIAN ORDERS   10/19/2018                    Hospital Admission Date: 10/19/2018  KARINA MINER   : 1965  Sex: Male        Attending Provider: Scotty Babcock DO     Allergies:  Liquid Adhesive    Infection:  None   Service:  Hem/Onc    Ht:  1.753 m (5' 9\")   Wt:  67 kg (147 lb 11.3 oz)   Admission Wt:  66.6 kg (146 lb 14.4 oz)    BMI:  21.81 kg/m 2   BSA:  1.81 m 2            Patient PCP Information     Provider PCP Type    NGUYỄN HERNÁNDEZ MD General      ED Clinical Impression     Diagnosis Description Comment Added By Time Added    Anemia in neoplastic disease [D63.0] Anemia in neoplastic disease [D63.0]  Jay Preston MD 10/19/2018  8:15 PM    Fever, unspecified fever cause [R50.9] Fever, unspecified fever cause [R50.9]  Jay Preston MD 10/19/2018  8:16 PM    Pneumonia of left lower lobe due to infectious organism (H) [J18.1] Pneumonia of left lower lobe due to infectious organism (H) [J18.1]  Jay Preston MD 10/19/2018  8:16 PM      Hospital Problems as of 10/23/2018              Priority Class Noted POA    Peritoneal carcinomatosis (H) Medium  8/3/2017 Yes    Cancer of sigmoid colon (H) Medium  8/3/2017 Yes    Large bowel obstruction (H) Medium  2018 Yes    Colon cancer (H) Medium  2018 Yes    Colon adenocarcinoma (H) Medium  7/3/2018 Yes    Neoplasm related pain Medium  2018 Yes    Small bowel obstruction (H) Medium  2018 Yes    Pneumonia of left lower lobe due to infectious organism (H) Medium  10/19/2018 Yes    Pneumonia Medium  10/20/2018 Yes      Non-Hospital Problems as of 10/23/2018              Priority Class Noted    Iron deficiency anemia due to chronic blood loss Medium  2017    Diarrhea, unspecified type Medium  2017    Hypokalemia Medium  2017    Colitis presumed infectious Medium  2018    Bacteremia Medium  " 2/25/2018    Dehydration Medium  8/30/2018      Code Status History     Date Active Date Inactive Code Status Order ID Comments User Context    10/23/2018 10:23 AM  Full Code [id number]  Es Dumont APRN CNP Outpatient    10/20/2018 12:26 AM 10/23/2018 10:23 AM Full Code [id number]  Olayinka Law MD Inpatient    8/6/2018 10:54 AM 10/20/2018 12:26 AM Full Code [id number]  Margarita Dougherty PA-C Outpatient    7/24/2018  7:04 PM 8/6/2018 10:54 AM Full Code [id number]  Chio Ochoa MD Inpatient    7/8/2018 12:19 PM 7/24/2018  7:04 PM Full Code [id number]  Margarita Dougherty PA-C Outpatient    7/3/2018  4:01 PM 7/8/2018 12:19 PM Full Code [id number]  Margarita Dougherty PA-C Inpatient    3/20/2018  1:29 PM 7/3/2018  4:01 PM Full Code [id number]  Ericka Lentz PA-C Outpatient    3/18/2018 10:24 AM 3/20/2018  1:29 PM Full Code [id number]  Stephon Mason MD Inpatient    2/20/2018  4:47 PM 2/25/2018  3:48 PM Full Code [id number]  Ericka Lentz PA-C Inpatient    2/9/2018 11:18 AM 2/20/2018  4:47 PM Full Code [id number]  Es Dumont APRN CNP Outpatient    2/5/2018 11:19 AM 2/9/2018 11:18 AM Full Code [id number]  Es Dumont APRN CNP Inpatient    12/7/2017  2:12 PM 2/5/2018 11:19 AM Full Code [id number]  Freddie Tadeo MD Outpatient    12/6/2017 11:44 AM 12/7/2017  2:12 PM Full Code [id number]  Rob Dudley MD Inpatient         Medication Review      START taking        Dose / Directions Comments    cefTRIAXone 1 GM vial   Commonly known as:  ROCEPHIN   Indication:  Bacteria in the Blood   Used for:  Infection due to Klebsiella pneumoniae        Dose:  1 g   Inject 1 g into the vein every 24 hours for 11 days   Quantity:  110 mL   Refills:  0          CONTINUE these medications which have NOT CHANGED        Dose / Directions Comments    fentaNYL 100 mcg/hr 72 hr patch   Commonly known as:  DURAGESIC   Used for:  Colon adenocarcinoma (H), Neoplasm related pain,  Peritoneal carcinomatosis (H)        Dose:  3 patch   Place 3 patches onto the skin every 72 hours remove old patch.   Quantity:  30 patch   Refills:  0        * hydromorphone HCl 500 MG/50ML Soln   Used for:  Neoplasm related pain        Dilute 250mg in 250ml NS  0.9mg IV every 10 mins as needed  Dispense 1 bag (250mg = 25ml)   Quantity:  25 mL   Refills:  0        * hydromorphone HCl 500 MG/50ML Soln   Used for:  Neoplasm related pain        Dilute 250mg in 250ml NS  0.9mg IV every 10 mins as needed  Dispense 1 bag (250mg = 25ml)   Quantity:  25 mL   Refills:  0        * hydromorphone HCl 500 MG/50ML Soln   Used for:  Neoplasm related pain        Start taking on:  10/25/2018   Dilute 250mg in 250ml NS  0.9mg IV every 10 mins as needed  Dispense 1 bag (250mg = 25ml)   Quantity:  25 mL   Refills:  0        * hydromorphone HCl 500 MG/50ML Soln   Used for:  Neoplasm related pain        Start taking on:  11/1/2018   Dilute 250mg in 250ml NS  0.9mg IV every 10 mins as needed  Dispense 1 bag (250mg = 25ml)   Quantity:  25 mL   Refills:  0        * hydromorphone HCl 500 MG/50ML Soln   Used for:  Neoplasm related pain        Start taking on:  11/8/2018   Dilute 250mg in 250ml NS  0.9mg IV every 10 mins as needed  Dispense 1 bag (250mg = 25ml)   Quantity:  25 mL   Refills:  0        * hydromorphone HCl 500 MG/50ML Soln   Used for:  Neoplasm related pain        Start taking on:  11/15/2018   Dilute 250mg in 250ml NS  0.9mg IV every 10 mins as needed  Dispense 1 bag (250mg = 25ml)   Quantity:  25 mL   Refills:  0        loperamide 2 MG capsule   Commonly known as:  IMODIUM        Dose:  2 mg   Take 2 mg by mouth as needed for diarrhea   Refills:  0        LORazepam 0.5 MG tablet   Commonly known as:  ATIVAN   Used for:  Peritoneal carcinomatosis (H), Cancer of sigmoid colon (H)        Dose:  0.5 mg   Take 1 tablet (0.5 mg) by mouth every 4 hours as needed (Anxiety, Nausea/Vomiting or Sleep)   Quantity:  30 tablet   Refills:   5        naloxone nasal spray   Commonly known as:  NARCAN   Used for:  Cancer of sigmoid colon (H), Long term current use of opiate analgesic        Dose:  4 mg   Spray 1 spray (4 mg) into one nostril alternating nostrils as needed for opioid reversal every 2-3 minutes until assistance arrives   Quantity:  0.2 mL   Refills:  0        ondansetron 8 MG ODT tab   Commonly known as:  ZOFRAN-ODT   Used for:  Nausea        Dose:  8 mg   Take 1 tablet (8 mg) by mouth every 8 hours as needed for nausea   Quantity:  60 tablet   Refills:  5        order for DME   Used for:  Colon adenocarcinoma (H), Small bowel obstruction (H)        Equipment being ordered: home suction machine with tubing for connection to venting G-tube Treatment Diagnosis: colon adenocarcinoma   Quantity:  1 Device   Refills:  0        parenteral nutrition - PTA/DISCHARGE ORDER        Inject into the vein daily FVHI   Refills:  0        prochlorperazine 10 MG tablet   Commonly known as:  COMPAZINE        Dose:  10 mg   10 mg every 6 hours as needed   Refills:  0        sodium chloride 0.9% Soln BOLUS        Dose:  1000 mL   Inject 1,000 mLs into the vein daily as needed For hydration.   Quantity:  1000 mL   Refills:  0        * Notice:  This list has 6 medication(s) that are the same as other medications prescribed for you. Read the directions carefully, and ask your doctor or other care provider to review them with you.      STOP taking     dexamethasone 1 MG tablet   Commonly known as:  DECADRON           dexamethasone 4 MG tablet   Commonly known as:  DECADRON           pantoprazole 40 MG EC tablet   Commonly known as:  PROTONIX                   Summary of Visit     Reason for your hospital stay       You were admitted with fevers and were found to have an infection in your blood stream from your PICC line.             After Care     Activity       Your activity upon discharge: as tolerated       Diet       Follow this diet upon discharge: Orders  Placed This Encounter      Regular Diet Adult, TPN/lipids       IV access       **Ordering Provider MUST call/page Care Coordinator/ to discuss arranging this service**    You are going home with the following vascular access device: PICC.             Referrals     Home infusion referral       [facility]  Phone  [phone]  Fax  [phone]    For resumption of home TPN and Dilaudid PCA; new IV antibiotics  Local Address (if different from home address): N/A    Anticipated Length of Therapy: per provider orders    Home Infusion Pharmacist to adjust therapy based on labs and clinical assessments: Yes    Labs:  May draw labs from Venous Catheter: Yes  Home Infusion Pharmacist to order labs based on therapy type and clinical assessments: Yes  Call/Fax Lab Results to: [facility]  Fax: [phone]      Agency Staff to assess nursing needs for Infusion Therapy.    Access Device Management:  IV Access Type: PICC  Flush with Heparin and Normal Saline IVP PRN and routine site care (per agency protocol) to maintain access device? Yes             Lab Orders     **Basic metabolic panel FUTURE anytime           **CBC with platelets FUTURE anytime       Last Lab Result: Hemoglobin (g/dL)       Date                     Value                 10/23/2018               8.5 (L)          ----------             Your next 10 appointments already scheduled     Oct 31, 2018 10:30 AM CDT   TELEMEDICINE with Sherry Coronel RPH   [facility]ication Therapy [facility] ([facility])    [address] [place] [address]  [address] [place] [id number] [phone]           Note: this is not an onsite visit; there is no need to come to the facility.            Nov 14, 2018  9:30 AM CST   (Arrive by 9:15 AM)   Return Visit with Lorenzo Trujillo MD   [facility] ([facility])    [address] [place] [address]  [address]  [facility] [address]    [phone]              Follow-Up Appointment Instructions     Future Labs/Procedures    Follow Up and recommended labs and tests     Comments:    Follow up as scheduled      Follow-Up Appointment Instructions     Follow Up and recommended labs and tests       Follow up as scheduled             Statement of Approval     Ordered          10/23/18 1024  I have reviewed and agree with all the recommendations and orders detailed in this document.  EFFECTIVE NOW     Approved and electronically signed by:  Es Dumont APRN CNP

## 2018-10-19 NOTE — PROGRESS NOTES
This is a recent snapshot of the patient's [facility] medical record.  For current drug dose and complete information and questions, call [phone] or In Basket pool, fv home infusion ([id number])  CSN Number:  [id number]

## 2018-10-19 NOTE — ED PROVIDER NOTES
[facility] DE[facility]MENT ([facility])  October 19, 2018    History     Chief Complaint   Patient presents with     Fever     Chills     HPI  Sebas Lopez is a 53 year old male with history of metastatic adenocarcinoma of the sigmoid colon who presents to the ED with 5-6 days of ongoing fevers and chills.  Patient states that he started to feel unwell this last weekend when he had fevers and rigors.  His highest recorded temperature at home was 104  F.  He was seen by his PCP, 3 days ago, and was started on amoxicillin that evening.  He believes that his source of infection might be from his PICC line as he develops bad rigors whenever he flushes the TPN.  He has had his current PICC line since 2/23/18 when it was placed and used it for TPN (12 hours) and chemotherapy.  He did not to TPN last night secondary to rigors and has also not done his hydration bowls yesterday.  Patient denies any issues with his ileostomy bag. Patient's last round of chemotherapy, Vectibix and Irinotecan was 3-4 weeks ago and was stopped until he decides what he wants to do another treatment.  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     Ulcerative colitis (H)      PAST SURGICAL HISTORY  Past Surgical History:   Procedure Laterality Date     COLONOSCOPY  2017     COLONOSCOPY N/A 11/30/2017    Procedure: COLONOSCOPY;  Colonoscopy;  Surgeon: Rob Dudley MD;  Location:  GI     COLONOSCOPY N/A 2/6/2018    Procedure: COMBINED  COLONOSCOPY, STENT PLACEMENT;  COMBINED COLONOSCOPY with Colonic Stent Placement;  Surgeon: Guru Lionel Mar MD;  Location: [facility] OR     COLONOSCOPY N/A 3/19/2018    Procedure: COMBINED COLONOSCOPY, STENT PLACEMENT;  flexible sigmoidoscopy with stent placement and dilation;  Surgeon: Alexis Rios MD;  Location: [facility] OR     COLONOSCOPY N/A 7/5/2018    Procedure: COMBINED COLONOSCOPY, STENT PLACEMENT;  flexible sigmoidoscopy with colonic stent placement ;  Surgeon: Alexis Rios MD;  Location: [facility] OR     GI SURGERY  07/10/2017    Extensive lysis of adhesions, small bowel resection with end ileostomy.      HERNIA REPAIR       INSERT PORT VASCULAR ACCESS Right 8/10/2017    Procedure: INSERT PORT VASCULAR ACCESS;  Single Lumen Right Chest Power Port;  Surgeon: Malena Andrew PA-C;  Location: [facility] OR     LAPAROSCOPY DIAGNOSTIC (GENERAL) N/A 12/6/2017    Procedure: LAPAROSCOPY DIAGNOSTIC (GENERAL);  Diagnostic Laparoscopy, Exploratory Laparotomy Anesthesia Block ;  Surgeon: Rob Dudley MD;  Location: [facility] OR     LAPAROTOMY EXPLORATORY N/A 12/6/2017    Procedure: LAPAROTOMY EXPLORATORY;;  Surgeon: Rob Dudley MD;  Location: [facility] OR     ORTHOPEDIC SURGERY Left     HIP ARTHROPLASTY     PICC INSERTION Right 02/23/2018    5Fr DL BioFlo PICC, 44cm (3cm external) in the R basilic vein w/ tip in the SVC RA junction     FAMILY HISTORY  Family History   Problem Relation Age of Onset     Hypertension Father      Diabetes Father      SOCIAL HISTORY  Social History   Substance Use Topics     Smoking status: Never Smoker     Smokeless tobacco: Never Used     Alcohol use 3.0 oz/week     5 Cans of beer per week      Comment: none for last 4 months     MEDICATIONS  Current Facility-Administered Medications   Medication     piperacillin-tazobactam (ZOSYN) 3.375 g vial to attach to  mL bag     sodium chloride 0.9% infusion     Current Outpatient Prescriptions   Medication      "dexamethasone (DECADRON) 1 MG tablet     dexamethasone (DECADRON) 4 MG tablet     fentaNYL (DURAGESIC) 100 mcg/hr 72 hr patch     hydromorphone HCl 500 MG/50ML SOLN     hydromorphone HCl 500 MG/50ML SOLN     [START ON 10/25/2018] hydromorphone HCl 500 MG/50ML SOLN     [START ON 11/1/2018] hydromorphone HCl 500 MG/50ML SOLN     [START ON 11/8/2018] hydromorphone HCl 500 MG/50ML SOLN     [START ON 11/15/2018] hydromorphone HCl 500 MG/50ML SOLN     loperamide (IMODIUM) 2 MG capsule     LORazepam (ATIVAN) 0.5 MG tablet     naloxone (NARCAN) nasal spray     ondansetron (ZOFRAN-ODT) 8 MG ODT tab     order for DME     pantoprazole (PROTONIX) 40 MG EC tablet     parenteral nutrition - PTA/DISCHARGE ORDER     prochlorperazine (COMPAZINE) 10 MG tablet     sodium chloride 0.9% SOLN BOLUS     ALLERGIES  Allergies   Allergen Reactions     Liquid Adhesive Rash     Dermabond, rash with pustules, occurred with abdominal surgery and port removal        I have reviewed the Medications, Allergies, Past Medical and Surgical History, and Social History in the Epic system.    Review of Systems   Constitutional: Positive for chills and fever.   Respiratory: Negative for shortness of breath.    Cardiovascular: Negative for chest pain.   Gastrointestinal: Negative for abdominal pain, nausea and vomiting.   Genitourinary: Negative for dysuria, flank pain and hematuria.   Skin: Negative for color change and rash.   All other systems reviewed and are negative.      Physical Exam   BP: 106/68  Pulse: 106  Temp: 98.5  F (36.9  C)  Resp: 18  Height: 175.3 cm (5' 9\")  Weight: 66.6 kg (146 lb 14.4 oz)  SpO2: 99 %      Physical Exam  General: generally well appearing and non-toxic, awake, alert, NAD  Head: normal cephalic  HEENT: pupils equal, conjugate gaze in tact  Neck: Supple  CV: regular rate and rhythm without murmur  Lungs: clear to ascultation  Abd: G tube RUQ ostomy LUQ. Skin surrounding G tube c/d/i/. Ostomy with liquid stool.  Soft, " non-tender, no guarding, no peritoneal signs  : No flank tenderness  Skin: Skin is intact around PICC line no signs of erythema.   EXT: lower extremities without swelling or edema  Neuro: awake, answers questions appropriately. No focal deficits noted     ED Course     ED Course     Procedures   5:09 PM  The patient was seen and examined by Dr. Preston in Room 14.          Labs Ordered and Resulted from Time of ED Arrival Up to the Time of Departure from the ED   CBC WITH PLATELETS DIFFERENTIAL - Abnormal; Notable for the following:        Result Value    WBC 16.6 (*)     RBC Count 2.53 (*)     Hemoglobin 6.4 (*)     Hematocrit 20.3 (*)     MCH 25.3 (*)     RDW 18.1 (*)     All other components within normal limits   COMPREHENSIVE METABOLIC PANEL - Abnormal; Notable for the following:     Sodium 130 (*)     Calcium 8.1 (*)     Albumin 1.6 (*)     Alkaline Phosphatase 204 (*)     All other components within normal limits   LACTIC ACID WHOLE BLOOD   ROUTINE UA WITH MICROSCOPIC   ABO/RH TYPE AND SCREEN   BLOOD CULTURE   BLOOD CULTURE   URINE CULTURE AEROBIC BACTERIAL            Assessments & Plan (with Medical Decision Making)   Sebas is a 53-year-old male who presents with fever several days ago and ongoing rigors mostly associated with use of his PICC line.  Patient denies any other localizing signs or symptoms of infection including no recent URI symptoms, abdominal pain, cough.  The patient is recently stopped chemotherapy, would consider things like neutropenic fever.  CBC pending.  Will also obtain UA and chest x-ray though biggest concern now is for infected PICC line.    I did discuss the case with Hematology/Oncology as they are the people who placed the PICC.  Discussed whether potentially pulling the PICC was warranted at this time.  They recommended against that, wanted to obtain blood cultures and would consider pulling the PICC with a positive blood culture.    Patient is mildly hypotensive, he has not  had any of his fluids.  We will start with fluid bolus and reassess.    Labs notable for elevated white count of 16.6.  Patient also has a critically low hemoglobin of 6.4 this has been downtrending.  He likely will require transfusion but not emergently, BP's improved with NS bolus.  Elevated white blood cell count is likely multifactorial but is higher than where it has been recently.  He has some hyponatremia but otherwise electrolytes are unremarkable.  Patient has a normal lactic acid.  Blood cultures are currently pending.    Chest x-ray notable streaky infiltrates at the left lung base.  Concerning for infection.     Given concern for possible pneumonia, elevated white count, reported fever and rigors at home, will treat with antibiotics.  Will try to cover both skin susan in case this is related to PICC line infection and also common pneumonia pathology.  Will cover with Zosyn.     I have reviewed the nursing notes.    I have reviewed the findings, diagnosis, plan and need for follow up with the patient.  This part of the document was transcribed by Taryn Minaya, Medical Scribe.   New Prescriptions    No medications on file       Final diagnoses:   Anemia in neoplastic disease   Fever, unspecified fever cause   Pneumonia of left lower lobe due to infectious organism (H)     I, Carmine Stanley, am serving as a trained medical scribe to document services personally performed by Jay Preston MD, based on the provider's statements to me.      Jay PERRIN MD, was physically present and have reviewed and verified the accuracy of this note documented by Carmine Stanley.    10/19/2018   [facility], [place], EMERGENCY DEPARTMENT     Jay Preston MD  10/19/18 2018

## 2018-10-19 NOTE — LETTER
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  Patient had PICC line replaced during this admission and is being treated for line-related bacteremia. Stable for discharge home today.    Follow-up plan:  Future Appointments  Date Time Provider Department Center   10/31/2018 10:30 AM Sherry Coronel [facility]   2018 9:30 AM Lorenzo Trujillo MD [facility]       Any outstanding tests or procedures:        Referrals     Future Labs/Procedures    Home infusion referral     Comments:    [facility]  Phone  [phone]  Fax  [phone]    For resumption of home TPN and Dilaudid PCA; new IV antibiotics  Local Address (if different from home address): N/A    Anticipated Length of Therapy: per provider orders    Home Infusion Pharmacist to adjust therapy based on labs and clinical assessments: Yes    Labs:  May draw labs from Venous Catheter: Yes  Home Infusion Pharmacist to order labs based on therapy type and clinical assessments: Yes  Call/Fax Lab Results to: [facility]  Fax: [phone]      Agency Staff to assess nursing needs for Infusion Therapy.    Access Device Management:  IV Access Type: PICC  Flush with Heparin and Normal Saline IVP PRN and routine site care (per agency protocol) to  maintain access device? Yes

## 2018-10-19 NOTE — ED TRIAGE NOTES
Pt arrives to triage with complaints of fever, chills and generalized weakness. Pt has PICC line with continuous infusion of dilaudid and receives TPN nightly. Concern for infection of PICC line. Pt reports chills started Sunday, pt saw primary care provider on Wednesday and was prescribed prophylaxis oral abx. Pt came to ER today because he has not been feeling better. A&O, VSS. Afebrile in triage.

## 2018-10-19 NOTE — IP AVS SNAPSHOT
MRN:[id number]                      After Visit Summary   10/19/2018    Sebas Lopez    MRN: [id number]           Thank you!     Thank you for choosing [facility] for your care. Our goal is always to provide you with excellent care. Hearing back from our patients is one way we can continue to improve our services. Please take a few minutes to complete the written survey that you may receive in the mail after you visit with us. Thank you!        Patient Information     Date Of Birth          1965        Designated Caregiver       Most Recent Value    Caregiver    Will someone help with your care after discharge? yes    Name of designated caregiver Bessie Daugherty    Phone number of caregiver [phone]    Caregiver address [address], [place]      About your hospital stay     You were admitted on:  October 19, 2018 You last received care in the:  [address] [facility]    You were discharged on:  October 23, 2018        Reason for your hospital stay       You were admitted with fevers and were found to have an infection in your blood stream from your PICC line.                  Who to Call     For medical emergencies, please call 911.  For non-urgent questions about your medical care, please call your primary care provider or clinic, [phone]          Attending Provider     Provider Specialty    Jay Preston MD Emergency Medicine    [facility], Scotty Santos DO Internal Medicine-Hematology & Oncology       Primary Care Provider Office Phone # Fax #    Jaguar Becker [phone]       When to contact your care team       [facility] triage line at [phone] for temp >100.4, uncontrolled nausea/vomiting/diarrhea/constipation, unrelieved pain, bleeding not relieved with pressure, dizziness, chest pain, shortness of breath, loss of consciousness, and any new or concerning symptoms.                  After Care Instructions     Activity        Your activity upon discharge: as tolerated            Diet       Follow this diet upon discharge: Orders Placed This Encounter      Regular Diet Adult, TPN/lipids            IV access       **Ordering Provider MUST call/page Care Coordinator/ to discuss arranging this service**    You are going home with the following vascular access device: PICC.                  Follow-up Appointments     Follow Up and recommended labs and tests       Follow up as scheduled                  Your next 10 appointments already scheduled     Oct 31, 2018 10:30 AM CDT   TELEMEDICINE with Sherry Coronel RPH   [facility] Medication Therapy Management ([facility])    [address] [place] [address]  [address] [place] [address] [phone]           Note: this is not an onsite visit; there is no need to come to the facility.            Nov 05, 2018 10:30 AM CST   [facility] Lab Draw with  [facility] DRAW   [facility] Lab Draw ([facility])    [address] [place] [address]  [address]  [facility] [address] [phone]            Nov 05, 2018 11:10 AM CST   (Arrive by 10:55 AM)   Return Visit with Linda Alves PA-C   [facility] ([facility])    [address] [place] [address]  [address] [place] [id number] [phone]            Nov 14, 2018  9:30 AM CST   (Arrive by 9:15 AM)   Return Visit with Lorenzo Trujillo MD   [facility] ([facility])    [address] [place] [address]  [address] [place] [id number] [phone]              Additional Services     Home infusion referral       [facility]  Phone  [phone]  Fax  [phone]    For resumption of home TPN and Dilaudid PCA; new IV antibiotics  Local Address (if different from home address): N/A    Anticipated Length of Therapy: per provider orders    Home Infusion Pharmacist to adjust therapy based on labs and  "clinical assessments: Yes    Labs:  May draw labs from Venous Catheter: Yes  Home Infusion Pharmacist to order labs based on therapy type and clinical assessments: Yes  Call/Fax Lab Results to: [facility]  Fax: [phone]      Agency Staff to assess nursing needs for Infusion Therapy.    Access Device Management:  IV Access Type: PICC  Flush with Heparin and Normal Saline IVP PRN and routine site care (per agency protocol) to maintain access device? Yes                  Future tests that were ordered for you     **Basic metabolic panel FUTURE anytime           **CBC with platelets FUTURE anytime       Last Lab Result: Hemoglobin (g/dL)       Date                     Value                 10/23/2018               8.5 (L)          ----------                  Pending Results     Date and Time Order Name Status Description    10/23/2018 1237 XR Chest 1 View Preliminary     10/22/2018 2330 Blood culture Preliminary     10/22/2018 2330 Blood culture Preliminary     10/21/2018 2330 Blood culture Preliminary     10/21/2018 2330 Blood culture Preliminary     10/20/2018 2330 Blood culture Preliminary     10/20/2018 2330 Blood culture Preliminary             Statement of Approval     Ordered          10/23/18 1024  I have reviewed and agree with all the recommendations and orders detailed in this document.  EFFECTIVE NOW     Approved and electronically signed by:  Es Dumont APRN CNP             Admission Information     Date & Time Provider Department Dept. Phone    10/19/2018 Scotty Babcock, DO [address] [facility] [place] [phone]      Your Vitals Were     Blood Pressure Pulse Temperature Respirations Height Weight    101/62 (BP Location: Right arm) 96 97.7  F (36.5  C) (Oral) 16 1.753 m (5' 9\") 67 kg (147 lb 11.3 oz)    Pulse Oximetry BMI (Body Mass Index)                96% 21.81 kg/m2          MyChart Information     [employer] gives you secure access to your electronic health record. If you see a " primary care provider, you can also send messages to your care team and make appointments. If you have questions, please call your primary care clinic.  If you do not have a primary care provider, please call [phone] and they will assist you.        Care EveryWhere ID     This is your Care EveryWhere ID. This could be used by other organizations to access your [facility] medical records  [id number]        Equal Access to Services     SARAH DUNN : Hadii aad ku hadasho Soyolandaali, waaxda luqadaha, qaybta kaalmada adeegjosianeda, waxay stella jimbobrice ortizbenita lawrence alona willis. So [facility] [phone].    ATENCIÓN: Si habla blanca, tiene a cid disposición servicios gratuitos de asistencia lingüística. Llame al [phone].    We comply with applicable federal civil rights laws and [place] laws. We do not discriminate on the basis of race, color, national origin, age, disability, sex, sexual orientation, or gender identity.               Review of your medicines      START taking        Dose / Directions    cefTRIAXone 1 GM vial   Commonly known as:  ROCEPHIN   Indication:  Bacteria in the Blood   Used for:  Infection due to Klebsiella pneumoniae        Dose:  1 g   Inject 1 g into the vein every 24 hours for 11 days   Quantity:  110 mL   Refills:  0         CONTINUE these medicines which have NOT CHANGED        Dose / Directions    fentaNYL 100 mcg/hr 72 hr patch   Commonly known as:  DURAGESIC   Used for:  Colon adenocarcinoma (H), Neoplasm related pain, Peritoneal carcinomatosis (H)        Dose:  3 patch   Place 3 patches onto the skin every 72 hours remove old patch.   Quantity:  30 patch   Refills:  0       * hydromorphone HCl 500 MG/50ML Soln   Used for:  Neoplasm related pain        Dilute 250mg in 250ml NS  0.9mg IV every 10 mins as needed  Dispense 1 bag (250mg = 25ml)   Quantity:  25 mL   Refills:  0       * hydromorphone HCl 500 MG/50ML Soln   Used for:  Neoplasm related pain        Dilute 250mg in 250ml NS  0.9mg  IV every 10 mins as needed  Dispense 1 bag (250mg = 25ml)   Quantity:  25 mL   Refills:  0       * hydromorphone HCl 500 MG/50ML Soln   Used for:  Neoplasm related pain        Start taking on:  10/25/2018   Dilute 250mg in 250ml NS  0.9mg IV every 10 mins as needed  Dispense 1 bag (250mg = 25ml)   Quantity:  25 mL   Refills:  0       * hydromorphone HCl 500 MG/50ML Soln   Used for:  Neoplasm related pain        Start taking on:  11/1/2018   Dilute 250mg in 250ml NS  0.9mg IV every 10 mins as needed  Dispense 1 bag (250mg = 25ml)   Quantity:  25 mL   Refills:  0       * hydromorphone HCl 500 MG/50ML Soln   Used for:  Neoplasm related pain        Start taking on:  11/8/2018   Dilute 250mg in 250ml NS  0.9mg IV every 10 mins as needed  Dispense 1 bag (250mg = 25ml)   Quantity:  25 mL   Refills:  0       * hydromorphone HCl 500 MG/50ML Soln   Used for:  Neoplasm related pain        Start taking on:  11/15/2018   Dilute 250mg in 250ml NS  0.9mg IV every 10 mins as needed  Dispense 1 bag (250mg = 25ml)   Quantity:  25 mL   Refills:  0       loperamide 2 MG capsule   Commonly known as:  IMODIUM        Dose:  2 mg   Take 2 mg by mouth as needed for diarrhea   Refills:  0       LORazepam 0.5 MG tablet   Commonly known as:  ATIVAN   Used for:  Peritoneal carcinomatosis (H), Cancer of sigmoid colon (H)        Dose:  0.5 mg   Take 1 tablet (0.5 mg) by mouth every 4 hours as needed (Anxiety, Nausea/Vomiting or Sleep)   Quantity:  30 tablet   Refills:  5       naloxone nasal spray   Commonly known as:  NARCAN   Used for:  Cancer of sigmoid colon (H), Long term current use of opiate analgesic        Dose:  4 mg   Spray 1 spray (4 mg) into one nostril alternating nostrils as needed for opioid reversal every 2-3 minutes until assistance arrives   Quantity:  0.2 mL   Refills:  0       ondansetron 8 MG ODT tab   Commonly known as:  ZOFRAN-ODT   Used for:  Nausea        Dose:  8 mg   Take 1 tablet (8 mg) by mouth every 8 hours as  needed for nausea   Quantity:  60 tablet   Refills:  5       order for DME   Used for:  Colon adenocarcinoma (H), Small bowel obstruction (H)        Equipment being ordered: home suction machine with tubing for connection to venting G-tube Treatment Diagnosis: colon adenocarcinoma   Quantity:  1 Device   Refills:  0       parenteral nutrition - PTA/DISCHARGE ORDER        Inject into the vein daily FVHI   Refills:  0       prochlorperazine 10 MG tablet   Commonly known as:  COMPAZINE        Dose:  10 mg   10 mg every 6 hours as needed   Refills:  0       sodium chloride 0.9% Soln BOLUS        Dose:  1000 mL   Inject 1,000 mLs into the vein daily as needed For hydration.   Quantity:  1000 mL   Refills:  0       * Notice:  This list has 6 medication(s) that are the same as other medications prescribed for you. Read the directions carefully, and ask your doctor or other care provider to review them with you.      STOP taking     dexamethasone 1 MG tablet   Commonly known as:  DECADRON           dexamethasone 4 MG tablet   Commonly known as:  DECADRON           pantoprazole 40 MG EC tablet   Commonly known as:  PROTONIX                Where to get your medicines      Some of these will need a paper prescription and others can be bought over the counter. Ask your nurse if you have questions.     You don't need a prescription for these medications     cefTRIAXone 1 GM vial                Protect others around you: Learn how to safely use, store and throw away your medicines at www.disposemymeds.org.        ANTIBIOTIC INSTRUCTION     You've Been Prescribed an Antibiotic - Now What?  Your healthcare team thinks that you or your loved one might have an infection. Some infections can be treated with antibiotics, which are powerful, life-saving drugs. Like all medications, antibiotics have side effects and should only be used when necessary. There are some important things you should know about your antibiotic treatment.       Your healthcare team may run tests before you start taking an antibiotic.    Your team may take samples (e.g., from your blood, urine or other areas) to run tests to look for bacteria. These test can be important to determine if you need an antibiotic at all and, if you do, which antibiotic will work best.      Within a few days, your healthcare team might change or even stop your antibiotic.    Your team may start you on an antibiotic while they are working to find out what is making you sick.    Your team might change your antibiotic because test results show that a different antibiotic would be better to treat your infection.    In some cases, once your team has more information, they learn that you do not need an antibiotic at all. They may find out that you don't have an infection, or that the antibiotic you're taking won't work against your infection. For example, an infection caused by a virus can't be treated with antibiotics. Staying on an antibiotic when you don't need it is more likely to be harmful than helpful.      You may experience side effects from your antibiotic.    Like all medications, antibiotics have side effects. Some of these can be serious.    Let you healthcare team know if you have any known allergies when you are admitted to the hospital.    One significant side effect of nearly all antibiotics is the risk of severe and sometimes deadly diarrhea caused by Clostridium difficile (C. Difficile). This occurs when a person takes antibiotics because some good germs are destroyed. Antibiotic use allows C. diificile to take over, putting patients at high risk for this serious infection.    As a patient or caregiver, it is important to understand your or your loved one's antibiotic treatment. It is especially important for caregivers to speak up when patients can't speak for themselves. Here are some important questions to ask your healthcare team.    What infection is this antibiotic treating  and how do you know I have that infection?    What side effects might occur from this antibiotic?    How long will I need to take this antibiotic?    Is it safe to take this antibiotic with other medications or supplements (e.g., vitamins) that I am taking?     Are there any special directions I need to know about taking this antibiotic? For example, should I take it with food?    How will I be monitored to know whether my infection is responding to the antibiotic?    What tests may help to make sure the right antibiotic is prescribed for me?      Information provided by:  www.cdc.gov/getsmart  U.S. Department of Health and Human Services  Centers for disease Control and Prevention  National Center for Emerging and Zoonotic Infectious Diseases  Division of Healthcare Quality Promotion             Medication List: This is a list of all your medications and when to take them. Check marks below indicate your daily home schedule. Keep this list as a reference.      Medications           Morning Afternoon Evening Bedtime As Needed    cefTRIAXone 1 GM vial   Commonly known as:  ROCEPHIN   Inject 1 g into the vein every 24 hours for 11 days   Last time this was given:  1 g on 10/23/2018  2:05 PM                                   fentaNYL 100 mcg/hr 72 hr patch   Commonly known as:  DURAGESIC   Place 3 patches onto the skin every 72 hours remove old patch.   Last time this was given:  3 patches on 10/22/2018  5:29 AM                                   * hydromorphone HCl 500 MG/50ML Soln   Dilute 250mg in 250ml NS  0.9mg IV every 10 mins as needed  Dispense 1 bag (250mg = 25ml)                                   * hydromorphone HCl 500 MG/50ML Soln   Dilute 250mg in 250ml NS  0.9mg IV every 10 mins as needed  Dispense 1 bag (250mg = 25ml)                                   * hydromorphone HCl 500 MG/50ML Soln   Dilute 250mg in 250ml NS  0.9mg IV every 10 mins as needed  Dispense 1 bag (250mg = 25ml)   Start taking on:   10/25/2018                                   * hydromorphone HCl 500 MG/50ML Soln   Dilute 250mg in 250ml NS  0.9mg IV every 10 mins as needed  Dispense 1 bag (250mg = 25ml)   Start taking on:  11/1/2018                                   * hydromorphone HCl 500 MG/50ML Soln   Dilute 250mg in 250ml NS  0.9mg IV every 10 mins as needed  Dispense 1 bag (250mg = 25ml)   Start taking on:  11/8/2018                                   * hydromorphone HCl 500 MG/50ML Soln   Dilute 250mg in 250ml NS  0.9mg IV every 10 mins as needed  Dispense 1 bag (250mg = 25ml)   Start taking on:  11/15/2018                                   loperamide 2 MG capsule   Commonly known as:  IMODIUM   Take 2 mg by mouth as needed for diarrhea                                   LORazepam 0.5 MG tablet   Commonly known as:  ATIVAN   Take 1 tablet (0.5 mg) by mouth every 4 hours as needed (Anxiety, Nausea/Vomiting or Sleep)                                   naloxone nasal spray   Commonly known as:  NARCAN   Spray 1 spray (4 mg) into one nostril alternating nostrils as needed for opioid reversal every 2-3 minutes until assistance arrives                                   ondansetron 8 MG ODT tab   Commonly known as:  ZOFRAN-ODT   Take 1 tablet (8 mg) by mouth every 8 hours as needed for nausea   Last time this was given:  8 mg on 10/21/2018  5:20 PM                                   order for DME   Equipment being ordered: home suction machine with tubing for connection to venting G-tube Treatment Diagnosis: colon adenocarcinoma                                   parenteral nutrition - PTA/DISCHARGE ORDER   Inject into the vein daily FVHI                                   prochlorperazine 10 MG tablet   Commonly known as:  COMPAZINE   10 mg every 6 hours as needed                                   sodium chloride 0.9% Soln BOLUS   Inject 1,000 mLs into the vein daily as needed For hydration.   Last time this was given:  20 mLs on 10/23/2018 12:37 PM                                    * Notice:  This list has 6 medication(s) that are the same as other medications prescribed for you. Read the directions carefully, and ask your doctor or other care provider to review them with you.

## 2018-10-19 NOTE — IP AVS SNAPSHOT
[address] [address]    Phone:  [phone]                                       After Visit Summary   10/19/2018    Sebas Lopez    MRN: [id number]           After Visit Summary Signature Page     I have received my discharge instructions, and my questions have been answered. I have discussed any challenges I see with this plan with the nurse or doctor.    ..........................................................................................................................................  Patient/Patient Representative Signature      ..........................................................................................................................................  Patient Representative Print Name and Relationship to Patient    ..................................................               ................................................  Date                                   Time    ..........................................................................................................................................  Reviewed by Signature/Title    ...................................................              ..............................................  Date                                               Time          22EPIC Rev 08/18

## 2018-10-20 PROBLEM — J18.9 PNEUMONIA: Status: ACTIVE | Noted: 2018-01-01

## 2018-10-20 NOTE — PROGRESS NOTES
Nursing Focus: Admission  D: Arrived at 2355 from ED via cart. Patient accompanied by wife. Admitted for pneumonia, fevers, rigors. No complaints at this time.      I: Admission process began.  Patient oriented to room, enviroment, call light.  Md. notified of patients arrival on unit.     A: Vital signs stable, afebrile.  Patient stable at this time.  No complaints at this time. Hgb 6.4, blood transfused with no complications. IV abx given x1. UA send, results pending. Sputum culture needs to be collected. Blood cultures positive for gram negative rods. MD notified. Generalized pain controlled with PCA from home and fentanyl patches. Ileostomy site WDL, pt empties when full. Voiding well. Up ind.     P: Implement plan of care when available. Continue to monitor patient. Nursing interventions as appropriate. Notify md with changes in pt status.

## 2018-10-20 NOTE — PROGRESS NOTES
CLINICAL NUTRITION SERVICES - ASSESSMENT NOTE     Nutrition Prescription    RECOMMENDATIONS FOR MDs/PROVIDERS TO ORDER:  -Order lyte replacement protocols (Mg++/K+phos)  -Consider decreasing IVF with start of TPN as this provides additional 2000mL/day fluids    Malnutrition Status:     Patient does not meet two of the above criteria necessary for diagnosing malnutrition but is at risk for malnutrition    Recommendations already ordered by Registered Dietitian (RD):  Pt Starting peripheral nutrition, please use Clinimix E for goal volume of 2L per day (Dextrose 5%, Amino Acids 4.25%) + 250 mL 20% Intralipid daily. Dosing Weight 65.9 kg. This provides 1180 kcal/day (100 g CHO, 85 g AA, 250 mL lipid)(18 kcal/kg/day)(62% of home TPN Kcal infusion and 85% of home TPN protein infusion that has been keeping wt stable).    Future/Additional Recommendations:  1. Monitor for ability to replace central line and transition from PPN to CPN.  2. If pt elects hospice/comfort care pathway consider discontinuing nutrition support if not in line with pt GOC.     REASON FOR ASSESSMENT  Sebas Lopez is a/an 53 year old male assessed by the dietitian for Provider Order - Resume via peripheral IV (PICC removed due to infection); Standard Peripheral Catheter -REQUIRES peripheral formula    NUTRITION HISTORY  Pt with metastatic colon cancer with progression of disease despite multiple regimens, note per HPI pt weighing hospice. Pt on [facility] TPN [facility]. PICC infected and removed, plan to start PPN for time being. Pt has venting G-tube for chronic nausea, chronic LBO/SBO. Pt states that he vents sometimes but other times can eat some oral po w/o needing to use it. He denies any issues with clogged venting g-tube or ileostomy blockages. If he eats po for pleasure will choose soft, ground, even textured type foods.  Also drinking 30-50oz of water, caffeine free tea/diet soda daily.    Home CPN orders:  T/W/Th/S/S - 2.4L, 100 g AA,  "315 g CHO, 300 mL intralipid 20%, 200 mEq NaCl, 80 mEq KCL, 2 mEq Calcium, 12 mEq Mg, 20 mmol phos, 249 mEq CL, 128 mEQ acetate, 1/3 standard trace elements (1 mL MTE-5), standard MVI, 2 mg zinc sulfate, 40 mcg sleneium, 5mg Vitamin K    M/F - 2.7L,  100 g AA, 315 g CHO, NO lipid, 200 mEq NaCl, 80 mEq KCL, 2 mEq Calcium, 12 mEq Mg, 20 mmol phos, 249 mEq CL, 128 mEQ acetate, 1/3 standard trace elements (1 mL MTE-5), standard MVI, 2 mg zinc sulfate, 40 mcg sleneium, 5mg Vitamin K    Daily average of 1900 kcal/day (29 kcal/kg)      CURRENT NUTRITION ORDERS  Diet: Regular (for pleasure)    LABS  Labs reviewed  Hyponatremia resolved, lytes today WNL. Albumin 1.4L - likely 2/2 illness/inflammation, bili WNL, alk phos 194H, ALT/AST WNL. BUN/Cr WNL.   Blood Glucose WNL 80-90s (No CPN 2/2 line infection)    MEDICATIONS  Medications reviewed. On protonix, zosyn, PCA, IVF: NS @ 125 mL/hr    ANTHROPOMETRICS  Height: 175.3 cm (5' 9\")  Most Recent Weight: 66 kg (145 lb 9.6 oz)    IBW: 72.7 kg  BMI: Normal BMI  Weight History:   Wt Readings from Last 10 Encounters:   10/20/18 66 kg (145 lb 9.6 oz)   10/04/18 64.3 kg (141 lb 12 oz)   10/04/18 64.3 kg (141 lb 12.8 oz)   09/21/18 65.1 kg (143 lb 9.6 oz)   09/07/18 64.1 kg (141 lb 4.8 oz)   09/05/18 65 kg (143 lb 4.8 oz)   09/02/18 65.4 kg (144 lb 3.2 oz)   08/30/18 63.2 kg (139 lb 4.8 oz)   08/24/18 68.2 kg (150 lb 6.4 oz)   08/17/18 70.1 kg (154 lb 8.7 oz)   Weight stable x 2 months  Dosing Weight: 65.9  Kg (lowest wt this admit 10/19)    ASSESSED NUTRITION NEEDS  Estimated Energy Needs: 4853-5018+ kcals/day (25 - 30+ kcals/kg)  Justification: Maintenance  Estimated Protein Needs:  grams protein/day (1.2 - 1.5 grams of pro/kg)  Justification: Maintenance  Estimated Fluid Needs: 8519-7680 mL/day per home TPN volume   Justification: Maintenance and Per provider pending fluid status    PHYSICAL FINDINGS  See malnutrition section below.  Hair not easily pluckable - thinning 2/2 " chemo  Nails WNL  No glossitis, No mouth sores, No Cheliosis    MALNUTRITION  % Intake: Decreased intake does not meet criteria  % Weight Loss: Weight loss does not meet criteria  Subcutaneous Fat Loss: Upper arm:  Mild-moderate  Muscle Loss: Temporal:  Mild-moderate, Facial & jaw region:  moderate and Thoracic region (clavicle, acromium bone, deltoid, trapezius, pectoral):  moderate  Fluid Accumulation/Edema: None  Malnutrition Diagnosis: Patient does not meet two of the above criteria necessary for diagnosing malnutrition but is at risk for malnutrition    NUTRITION DIAGNOSIS  Inadequate parenteral nutrition infusion related to central line infection as evidenced by PICC being removed from CPN dependent pt, plan for PPN in the interim.      INTERVENTIONS  Implementation  Nutrition Education: Provided education on RD role and nutrition plan with pt and spouse by bedside.    Parenteral Nutrition/IV Fluids - Initiate     Goals  Total avg nutritional intake to meet a minimum of 25+ kcal/kg and 1.2 g PRO/kg daily (per dosing wt 65.9 kg).     Monitoring/Evaluation  Progress toward goals will be monitored and evaluated per protocol.    Theresa Funes RD, , LD.  Weekend Pager: [phone]

## 2018-10-20 NOTE — PLAN OF CARE
Problem: Patient Care Overview  Goal: Plan of Care/Patient Progress Review  Patient's Hgb 8.0, red blood cell transfusion done, patient tolerated that, continues to be on IV zosyn, PICC line line removed and the tip sent to the lab for culture, has two PIV, one on each arm, started dilaudid PCA at 0.9 mg every 10mins with total of 5,4 mg every hour, patient's own PCA discontinued and encouraged the wife to take it home. Poor appetite, and TPN will be starting this evening, lipid also, and patient has g tube that is clamped and patient manages his own ileostomy, and he has emptied it many times, good urine output, lower abdominal pain is being managed by the PCA pain med, has not been coughing today.

## 2018-10-20 NOTE — ED NOTES
[facility], [place]   ED Nurse to Floor Handoff     Sebas Lopez is a 53 year old male who speaks English and lives with a spouse,  in a home  They arrived in the ED by car from home    ED Chief Complaint: Fever and Chills    ED Dx;   Final diagnoses:   Anemia in neoplastic disease   Fever, unspecified fever cause   Pneumonia of left lower lobe due to infectious organism (H)         Needed?: No    Allergies:   Allergies   Allergen Reactions     Liquid Adhesive Rash     Dermabond, rash with pustules, occurred with abdominal surgery and port removal    .  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     Ulcerative colitis (H)       Baseline Mental status: WDL  Current Mental Status changes: at basesline    Infection present or suspected this encounter: cultures pending  Sepsis suspected: No  Isolation type: No active isolations     Activity level - Baseline/Home:  Stand with Assist  Activity Level - Current:   Stand with Assist    Bariatric equipment needed?: No    In the ED these meds were given:   Medications   0.9% sodium chloride BOLUS (0 mLs Intravenous Stopped 10/19/18 2127)     Followed by   sodium chloride 0.9% infusion (1,000 mLs Intravenous New Bag 10/19/18 2242)   piperacillin-tazobactam (ZOSYN) 3.375 g vial to attach to  mL bag (3.375 g Intravenous New Bag 10/19/18 2132)       Drips running?  No    Home pump  Yes, continuous dilaudid ball.    Current LDAs  Peripheral IV 09/07/18 Left;Anterior Upper forearm (Active)   Number of days:42       Peripheral IV 10/19/18 Left Lower forearm (Active)   Site Assessment WDL 10/19/2018  8:51 PM   Line Status Infusing  10/19/2018  8:51 PM   Phlebitis Scale 0-->no symptoms 10/19/2018  8:51 PM   Dressing Intervention New dressing  10/19/2018  8:51 PM   Number of days:0       PICC Double Lumen 02/23/18 Right Basilic (Active)   Number of days:238       Gastrostomy/Enterostomy Gastrostomy LUQ 18 fr 18fr G-tube for venting (Active)   Number of days:79       Ileostomy (Active)   Number of days:317       Incision/Surgical Site 08/10/17 Right Chest (Active)   Number of days:435       Incision/Surgical Site 12/06/17 Abdomen (Active)   Number of days:317       Labs results:   Labs Ordered and Resulted from Time of ED Arrival Up to the Time of Departure from the ED   CBC WITH PLATELETS DIFFERENTIAL - Abnormal; Notable for the following:        Result Value    WBC 16.6 (*)     RBC Count 2.53 (*)     Hemoglobin 6.4 (*)     Hematocrit 20.3 (*)     MCH 25.3 (*)     RDW 18.1 (*)     Absolute Neutrophil 11.2 (*)     Absolute Monocytes 2.4 (*)     All other components within normal limits   COMPREHENSIVE METABOLIC PANEL - Abnormal; Notable for the following:     Sodium 130 (*)     Calcium 8.1 (*)     Albumin 1.6 (*)     Alkaline Phosphatase 204 (*)     All other components within normal limits   LACTIC ACID WHOLE BLOOD   ROUTINE UA WITH MICROSCOPIC   ABO/RH TYPE AND SCREEN   BLOOD CULTURE   BLOOD CULTURE   URINE CULTURE AEROBIC BACTERIAL       Imaging Studies:   Recent Results (from the past 24 hour(s))   XR Chest 2 Views    Narrative    Exam: XR CHEST 2 VW, 10/19/2018 7:03 PM    Indication: SOB;     Comparison: Radiograph of the chest 2/23/2018    Findings:   PA and lateral views of the chest. Right arm PICC tip projects over  the low SVC. Cardiomediastinal silhouette is within normal limits.  Pulmonary vasculature is distinct. Streaky left lung base opacities.  No pleural effusion or pneumothorax. Percutaneous gastrostomy tube  balloon is inflated over the stomach.      Impression    Impression: New streaky opacities in the left lung base which  "are  suspicious for infection.     I have personally reviewed the examination and initial interpretation  and I agree with the findings.    FLORENTIN VILLELA MD       Recent vital signs:   BP 96/61  Pulse 106  Temp 99  F (37.2  C) (Oral)  Resp 18  Ht 1.753 m (5' 9\")  Wt 66.6 kg (146 lb 14.4 oz)  SpO2 98%  BMI 21.69 kg/m2    Cardiac Rhythm: Normal Sinus  Pt needs tele? No  Skin/wound Issues: None    Code Status: Full Code    Pain control: good    Nausea control: good    Abnormal labs/tests/findings requiring intervention: See EPIC Hgb 6.4    Family present during ED course? Yes   Family Comments/Social Situation comments: none    Tasks needing completion: None    Suir Menchaca, RN  [phone] [facility] ED      "

## 2018-10-20 NOTE — PROGRESS NOTES
[facility], [place]    Hematology / Oncology Progress Note    Date of Admission: 10/19/2018  Hospital Day #: 0   Date of Service (when I saw the patient): 10/20/2018     Assessment & Plan   Sebas Lopez is a 54 y/o male with PMHx significant for ulcerative colitis and metastatic colon cancer s/p ileostomy, PEG tube placement and progression on multiple therapies. On a home PCA for pain management and requiring TPN nutrition support. He presents to the ED with ~1 week of fevers (up to 104 at home), chills, and generally feeling unwell. He saw his PCP about 3 days PTA and was started on Augmentin but his symptoms did not improve.     #Klebsiella pneumoniae bacteremia. Suspect PICC associated.   #Fevers/chills.   #Sepsis/SIRS. Hypotensive, elevated WBC count, tachycardic, but afebrile.  Patient has had PICC in place since 2/2018. Over the past few days, whenever he would access PICC to administer TPN he would develop rigors. He did not access PICC or give TPN on the night prior to admission due to this. Fevers since around Sunday PTA. Highest was 104, but would defervesce quickly per patient report so he was not too concerned about them. Denies nausea/vomiting. No change in ostomy output amount/consistency. Denies dysuria or URI symptoms. Started on Augmentin ~3 days prior to admission with minimal improvement in symptoms.  -CXR with streaky opacities in LLL per report, although it is not very convincing. UA negative, UCx pending.  -BCx 10/19 from R arm positive for GNR which were further identified as Klebsiella pneumoniae on Verigene. 2/2 bottles. Patient quite sure one culture was drawn from his PICC in the ER although neither labeled as such. Sensitivities pending.   -PICC removed. Tip culture sent, pending.   -Blood Cx daily until negative Cx x3 days.   -Started on Zosyn, continue at this time.   -IVF     #H/o ulcerative colitis.   #Metastatic colon cancer.   #S/p ileostomy for  chronic bowel obstruction.  #S/p venting G-tube.   Patient has progressed on multiple therapies. Most recently on panitumab and irinotecan (C3 9/21/18), however CT scan 10/4 with progression of disease including worsening of peritoneal carcinomatosis and primary colonic lesion. Per Dr. Long, further options are limited but discussed a few therapy options (Regorafenib, Lonsurf) of which the chances of response are low. Additionally discussed continuing palliative care with transition to hospice. The patient is currently weighing his options.   -Vents G tube about once daily.   -Continue diet of soft foods with TPN support. Pharmacy & nutrition to manage TPN, currently having to give through an PIV while PICC is out.   -UC not treated.   -Ileostomy output and consistency stable per patient, monitor.     #Chronic pain.   -Continue PTA home PCA dose: Dilaudid 0.9 mg q10 minutes prn, no continuous.   -Continue PTA Fentanyl patch 100 mcg/72hr.  -Follows with palliative care as an outpatient.     #Chronic nausea.   -Continue Zofran and compazine prn.    #Chronic hyponatremia.   -IVF NS as below.     #Anemia. Likely 2/2 chronic illness.   #Leukocytosis. Likely 2/2 infection.   -Transfuse for hgb <7. Received 1 unit PRBCx for admission hgb of 6.4.    FEN:  -IVF @ 125 ml/hr, change with initiation of TPN to 100 ml/hr.  -PRN lyte replacement  -RDAT    Prophy/Misc:  -VTE: Lovenox ppx  -GI/PUD: PTA PPI    Code: Full     Dispo: Pending improvement of acute issues, resolution of bacteremia, and fevers, likely a 2-3 day stay.     Margarita Dougherty PA-C  Hematology/Oncology  Pager #[phone]    Interval History   Brain states he feels okay today. Had some chills with fevers at home. Denies URI sx, dysuria, nausea/vomiting/increased ostomy output. No chest pain or SOB. No increase in chronic pain. Only endorses fever and chills. Ostomy output stable, no increase. Does have some liquid output via rectum intermittently, this is also unchanged.  Feeling better since admission.    Physical Exam   Temp: 98.7  F (37.1  C) Temp src: Oral BP: 96/63 Pulse: 89   Resp: 16 SpO2: 97 % O2 Device: None (Room air)    Vitals:    10/19/18 1427 10/19/18 2357 10/20/18 0814   Weight: 66.6 kg (146 lb 14.4 oz) 65.9 kg (145 lb 4.8 oz) 66 kg (145 lb 9.6 oz)     Vital Signs with Ranges  Temp:  [96.9  F (36.1  C)-99.4  F (37.4  C)] 98.7  F (37.1  C)  Pulse:  [] 89  Resp:  [16-18] 16  BP: ()/(55-76) 96/63  SpO2:  [95 %-100 %] 97 %  I/O last 3 completed shifts:  In: 1150 [I.V.:850]  Out: -     Constitutional: Pleasant male seen sitting up in bed, in NAD. Alert and interactive.   HEENT: NCAT, anicteric sclerae, conjunctiva clear. Moist mucous membranes.   Respiratory: Non-labored breathing, good air exchange. Lungs are clear to auscultation bilaterally, without wheezing, crackles or rhonchi. No cough noted.   Cardiovascular: Regular rate and rhythm with no murmur, rub or gallop.  GI: Quiet bowel sounds. Abdomen is mostly soft, non-distended, and non-tender to palpation. Ileostomy in RUQ and Venting G tube in LUQ, no surrounding erythema or drainage of either.  Skin: Warm and dry. No rashes or lesions on exposed surfaces.  Musculoskeletal: Extremities grossly normal. No tenderness or edema present.   Neurologic: A &O x3, speech normal, answering questions appropriately. Moves all extremities spontaneously. Grossly non-focal.  Neuropsychiatric: Mentation and affect normal/appropriate.  VAD: PICC is c/d/i with no erythema, drainage, or tenderness. Going to be removed.    Medications   Current Facility-Administered Medications   Medication     enoxaparin (LOVENOX) injection 40 mg     [START ON 10/22/2018] fentaNYL (DURAGESIC) 100 mcg/hr 72 hr patch 3 patch     fentaNYL (DURAGESIC) Patch in Place     [START ON 10/22/2018] fentaNYL (DURAGESIC) patch REMOVAL     guaiFENesin (ROBITUSSIN) 20 mg/mL solution 10 mL     heparin 100 UNIT/ML injection 5 mL     heparin lock flush 10  UNIT/ML injection 5-10 mL     heparin lock flush 10 UNIT/ML injection 5-10 mL     HYDROmorphone (DILAUDID) PCA 1 mg/mL OPIOID TOLERANT     lidocaine (LMX4) cream     lidocaine 1 % 1 mL     lipids (INTRALIPID) 20 % infusion 250 mL     loperamide (IMODIUM) capsule 2 mg     LORazepam (ATIVAN) tablet 0.5 mg     magnesium sulfate 4 g in 100 mL sterile water (premade)     Medication Instruction     naloxone (NARCAN) injection 0.1-0.4 mg     ondansetron (ZOFRAN) injection 8 mg    Or     ondansetron (ZOFRAN-ODT) ODT tab 8 mg    Or     ondansetron (ZOFRAN) tablet 8 mg     pantoprazole (PROTONIX) EC tablet 40 mg     parenteral nutrition - Clinimix E     piperacillin-tazobactam (ZOSYN) 3.375 g vial to attach to  mL bag     potassium chloride (KLOR-CON) Packet 20-40 mEq     potassium chloride 10 mEq in 100 mL intermittent infusion with 10 mg lidocaine     potassium chloride 10 mEq in 100 mL sterile water intermittent infusion (premix)     potassium chloride 20 mEq in 50 mL intermittent infusion     potassium chloride SA (K-DUR/KLOR-CON M) CR tablet 20-40 mEq     potassium phosphate 15 mmol in D5W 250 mL intermittent infusion     potassium phosphate 20 mmol in D5W 250 mL intermittent infusion     potassium phosphate 20 mmol in D5W 500 mL intermittent infusion     potassium phosphate 25 mmol in D5W 500 mL intermittent infusion     prochlorperazine (COMPAZINE) tablet 10 mg    Or     prochlorperazine (COMPAZINE) injection 5 mg     sodium chloride (PF) 0.9% PF flush 10-20 mL     sodium chloride (PF) 0.9% PF flush 10-20 mL     sodium chloride 0.9% infusion       Data   CBC  Recent Labs  Lab 10/20/18  0615 10/19/18  1645   WBC 12.4* 16.6*   RBC 3.08* 2.53*   HGB 8.0* 6.4*   HCT 25.4* 20.3*   MCV 83 80   MCH 26.0* 25.3*   MCHC 31.5 31.5   RDW 18.3* 18.1*    411     CMP  Recent Labs  Lab 10/20/18  0615 10/19/18  1645    130*   POTASSIUM 4.0 4.1   CHLORIDE 104 99   CO2 22 23   ANIONGAP 9 8   GLC 86 97   BUN 8 11   CR  0.86 0.83   GFRESTIMATED >90 >90   GFRESTBLACK >90 >90   ANNAMARIE 8.0* 8.1*   MAG 1.8  --    PHOS 3.7  --    PROTTOTAL 6.5* 7.2   ALBUMIN 1.4* 1.6*   BILITOTAL 0.4 0.2   ALKPHOS 194* 204*   AST 26 34   ALT 18 22     INRNo lab results found in last 7 days.    Results for orders placed or performed during the hospital encounter of 10/19/18   XR Chest 2 Views    Narrative    Exam: XR CHEST 2 VW, 10/19/2018 7:03 PM    Indication: SOB;     Comparison: Radiograph of the chest 2/23/2018    Findings:   PA and lateral views of the chest. Right arm PICC tip projects over  the low SVC. Cardiomediastinal silhouette is within normal limits.  Pulmonary vasculature is distinct. Streaky left lung base opacities.  No pleural effusion or pneumothorax. Percutaneous gastrostomy tube  balloon is inflated over the stomach.      Impression    Impression: New streaky opacities in the left lung base which are  suspicious for infection.     I have personally reviewed the examination and initial interpretation  and I agree with the findings.    FLORENTIN VILLELA MD

## 2018-10-20 NOTE — H&P
[facility], [place] -- History and Physical -- Hematology / Oncology  Date of Admission:  10/19/2018 -- Date of Service (when I saw the patient): 10/19/18    ASSESSMENT & PLAN  Sebas Lopez is a 53 year old male with metastatic colon cancer with progression of disease despite multiple regimens, who presents with fever, chills, and found to have a LLL pneumonia.     # LLL Pneumonia  # Non-neutropenic fever in setting of PICC Line  # Hypotension/sepsis  - Suspect that he developed the pneumonia last weekend when his T 104, and that it has been at least partially treated with the amoxicillin given to him by his PCP. However, he may have seeded his line or developed another line infection along with this, as he does have rigors with line access, and the amoxicillin is not likely to have been able to clear his line.  - CXR in ED showed LLL streaky opacities  - Zosyn started in ED (10/19-), will continue to cover pneumonia and also broadly for some line infection. Consider add vanco but hold off for now.  - Sputum culture, s pneumo ag  - incentive spirometry  - UA/Ucx  - Follow up Blood cultures from port, unclear whether he will recover organisms given his recent amoxicillin, may need to consider empiric course if nothing grows out.  - Blood culture q24h PRN fever  - IVFs at 125ml/hr    # Colon cancer - long history copied below. Weighing hospice.  Follows with Dr. Long  Has been on panitumab and irinotecan, cycle 3 was on 9/21/18, but  CT scan 10/4 shows definite progression with worsening peritoneal carcinomatosis as well as worsening primary disease of the colon. Dr. Long discussed that now his options are very limited.  He does not want to use a Avastin because he is at a high risk of gut perforation. They discussed possibility of trying Regorafenib although there is a chance that there would be poor absorption of the medication and we also discussed the possibility of side effects.   Dr. Long also discussed that the chances of this working is low but we can consider it.  Dr. Long also briefly discussed Lonsurf although chances of it working would also be low. They also discussed the other possibility of not trying further anticancer directed therapy and focusing all our effort in making and keeping him as comfortable as possible and going with the hospice approach. He wants to think about it before making a final decision. He will let us know when he has decided one way or the other. Previously discussed the option to continuing with palliative care vs hospice at this time. At this time he would like to continue with palliative care and understands that they can help him enroll into hospice when the time is right.      # Anemia - likely from chemotherapy as well as history of iron deficiency anemia  - Transfuse as needed   # Hyponatremia - chronic, will run NS for now and adjust TPN when it is ordered  #Chronic pain - continue fentanyl patch (3x 100), dilaudid PCA (home settings are dilaudid 0.9mg q10min bump only, no continuous)  #Chronic nausea - continue zofran and compazine prn  #Chronic bowel obstruction - has venting GT about once a day. Still takes soft foods and pills but uncertain about his absorption.  # TPN dependent - will order fluids for night, TPN (12hrs)  to be reordered in AM    # Pain Assessment:  Current Pain Score 8/6/2018   Patient currently in pain? yes   Pain score (0-10) -   Pain location Abdomen   Pain descriptors Cramping   - Sebas is experiencing pain due to chronic LBO/SBO. Pain management was discussed and the plan was created in a collaborative fashion.  Sebas's response to the current recommendations: engaged  - Please see the plan for pain management as documented above    FEN  - IVFs: NS @ 125  - Diet: soft foods as tolerated    PPX  - DVT: lovenox q24  - GI: continue PTA PPI    Lines/Drains: venting G tube, PICC line, ostomy  Consults: none  CODE: full  DISPO:  inpatient >2 days for treatment of pneumonia.    CHIEF COMPLAINT/REASON FOR ADMIT: subjective fever, chills    HISTORY OF PRESENT ILLNESS  History obtained from chart review and discussion with the patient.     Sebas Lopez is a 53 year old male with metastatic colon cancer with progression of disease despite multiple regimens, who presents with fever, chills, and found to have a LLL pneumonia.     His symptoms actually started last weekend, no cough, but he did have a fever up to 103-104 F on [place] 10/14, which eventually improved.  A few days later he had a routine labs done, showing WBC of 24.3 and Hgb of 7.3. On 10/17, he had already been having a low grade temp over the last few days with a Tmax of 100.00 and a sore throat. He saw his PCP 3 days ago and was started on amoxicillin. After starting that his fevers have improved, however, he does have rigors whenever TPN is flushed through PICC and so he skipped his TPN last night. No ostomy issues and no increase in stool OP.     No dysuria, hematuria, flank pain, nausea, vomiting, shortness of breath, cough. His pain is well controlled on the current regimen. He continues to vent his Gtube more or less once a day. He otherwise feels fine when his PICC line is not accessed /flushed for TPN.     Copied oncology history, reviewed:  Sebas is a 53-year-old male who has a history of ulcerative colitis diagnosed more than 20 years ago, but he has not had medical management for that.  He was doing well, but in 05/2017 he presented with a few weeks of abdominal pain.  At that time, his imaging showed that he had a sigmoid wall thickening with adjacent mesenteric lymphadenopathy and free fluid near the abdominal wall mesh from his prior hernia surgery.  He subsequently underwent a colonoscopy which was incomplete and showed an obstructing sigmoid colon mass.  The biopsy from the sigmoid mass showed sigmoid adenocarcinoma.  He then developed obstructive symptoms and  underwent exploratory laparotomy on 07/10/2017.  During that he was found to have diffuse peritoneal carcinomatosis.  Extensive lysis of adhesions was performed and a small bowel resection was performed with end ileostomy.  The biopsies from the multiple large peritoneal metastasis also were positive for metastatic adenocarcinoma.       He started palliative FOLFOX on 8/11/17. C#6 given on 10/26/17  After 6 cycles, he has stable disease     On 12/6/17, he had Diagnostic laparoscopy and Exploratory laparotomy, but HIPEC was not performed as Peritoneal cancer index was 26 with diffuse involvement of the small bowel as well as the small bowel mesentery.      He then presented to ED on 1/26 with abdominal pain and associated nausea. CT A/P on 1/26 with 5.2 x 4.5 x 3.6 cm proximal sigmoid mass causnig colonic obstriction with singificant distention of cecum (7cm in diameter), ascending colon and proximal transverse colon. No pneumatosis or pneumoperitoneum. Also small volume of ascites with associated findings concerning for peritoneal carcinomatosis, increased from prior studies. Dr. Dudley was consulted and recommended no surgical intervention.     He was then seen in clinic on 1/29/2018 as well as yesterday because for the last few days he was unable to tolerate solid foods and he was getting abdominal cramping and some nausea. He also noticed that his ostomy output decreased. He was given IV fluids as well as antibiotics and yesterday he was started on OxyContin 10 mg twice a day along with p.r.n. oxycodone. Of note a few weeks ago he was started on short acting octreotide to decrease the ostomy output but he stopped taking it about one week ago and few days prior to that he noticed worsening of the abdominal cramping.        Repeat CT scan showed worsening peritoneal carcinomatosis and he is getting more symptomatic in terms of worsening abdominal pain and difficulty eating because of worsening abdominal cramping.  He was admitted to the hospital with worsening colon distention and colon stent was placed which improved his symptoms.      He was recently hospitalized from 2/20-2/25/18 with presumed infectious colitis and bacteremia. His port was removed. He was discharged home on IV vancomycin via a PICC line which he completed on 3/7/2018.      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 He was hospitalized with a bowel obstruction from 3/18-3/20/18 and a colonic stent was placed on 3/19/18.  C#8 3/22/18 FOLFOX  C#9 4/5/18 FOLFOX        Repeat CT shows slightly improved disease and less ascites now.   Because of progressive fatigue, we stopped the 5-FU bolus and leucovorin and continued with the 5-FU infusion and oxaliplatin.      C#10 5/4/18  C#11 5/18/18  C#13 5/31/18  C#13 6/15/18     Repeat CT scan showed worsening peritoneal disease. There is also increased fluid in the proximal control on as well as post stent fluid in the rectum. This could be consistent with colitis  I gave him levofloxacin/flagyl and plan was to switch therapy to irinotecan and panitumumab though was hospitalized on 7/3 for abdominal pain and bloating as well as rectal bleeding. Found to have colitis with increased fluid dilation of the colon. GI did a flex sig and placed a colon stent for the third time. Other stents were found to have ingrowth of tumor.   Again admitted on 7/24 with SBO and PUALINA. Venting G tube placed which helped with N/V.        He initiated Irinotecan and Panitumumab on 8/17/18.  Cycle #2 was delayed because of dehydration and Acute kidney injury and hyponatremia and weight loss.  Cycle #2 on 9/7/2018  Cycle #3 on 9/21/2018     Repeat CT scan done 10/4 shows progression of the disease with worsening peritoneal carcinomatosis as well as increase in the size of the primary tumor.       [facility]  Past Medical History:   Diagnosis Date     Adenocarcinoma of sigmoid colon (H)     stage IV  sigmoid adenocarcinoma with peritoneal metastasis.     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     Ulcerative colitis (H)        PSH  Past Surgical History:   Procedure Laterality Date     COLONOSCOPY  2017     COLONOSCOPY N/A 11/30/2017    Procedure: COLONOSCOPY;  Colonoscopy;  Surgeon: Rob Dudley MD;  Location: [facility] GI     COLONOSCOPY N/A 2/6/2018    Procedure: COMBINED COLONOSCOPY, STENT PLACEMENT;  COMBINED COLONOSCOPY with Colonic Stent Placement;  Surgeon: Guru Lionel Mar MD;  Location: [facility] OR     COLONOSCOPY N/A 3/19/2018    Procedure: COMBINED COLONOSCOPY, STENT PLACEMENT;  flexible sigmoidoscopy with stent placement and dilation;  Surgeon: Alexis Rios MD;  Location: [facility] OR     COLONOSCOPY N/A 7/5/2018    Procedure: COMBINED COLONOSCOPY, STENT PLACEMENT;  flexible sigmoidoscopy with colonic stent placement ;  Surgeon: Alexis Rios MD;  Location: [facility] OR     GI SURGERY  07/10/2017    Extensive lysis of adhesions, small bowel resection with end ileostomy.      HERNIA REPAIR       INSERT PORT VASCULAR ACCESS Right 8/10/2017    Procedure: INSERT PORT VASCULAR ACCESS;  Single Lumen Right Chest Power Port;  Surgeon: Malena Andrew PA-C;  Location: [facility]     LAPAROSCOPY DIAGNOSTIC (GENERAL) N/A 12/6/2017    Procedure: LAPAROSCOPY DIAGNOSTIC (GENERAL);  Diagnostic Laparoscopy, Exploratory Laparotomy Anesthesia Block ;  Surgeon: Rob Dudley MD;  Location: [facility] OR     LAPAROTOMY EXPLORATORY N/A 12/6/2017    Procedure: LAPAROTOMY EXPLORATORY;;  Surgeon: Rob Dudley MD;  Location: [facility] OR     ORTHOPEDIC SURGERY Left     HIP ARTHROPLASTY     PICC INSERTION Right 02/23/2018    5Fr DL BioFlo PICC, 44cm (3cm external) in the R basilic vein w/ tip in the SVC RA junction       Prior to Admission MEDICATIONS  Prior  to Admission Medications   Prescriptions Last Dose Informant Patient Reported? Taking?   LORazepam (ATIVAN) 0.5 MG tablet   No No   Sig: Take 1 tablet (0.5 mg) by mouth every 4 hours as needed (Anxiety, Nausea/Vomiting or Sleep)   dexamethasone (DECADRON) 1 MG tablet   Yes No   dexamethasone (DECADRON) 4 MG tablet   Yes No   fentaNYL (DURAGESIC) 100 mcg/hr 72 hr patch   No No   Sig: Place 3 patches onto the skin every 72 hours remove old patch.   hydromorphone HCl 500 MG/50ML SOLN   No No   Sig: Dilute 250mg in 250ml NS    0.9mg IV every 10 mins as needed    Dispense 1 bag (250mg = 25ml)   hydromorphone HCl 500 MG/50ML SOLN   No No   Sig: Dilute 250mg in 250ml NS    0.9mg IV every 10 mins as needed    Dispense 1 bag (250mg = 25ml)   hydromorphone HCl 500 MG/50ML SOLN   No No   Sig: Dilute 250mg in 250ml NS    0.9mg IV every 10 mins as needed    Dispense 1 bag (250mg = 25ml)   hydromorphone HCl 500 MG/50ML SOLN   No No   Sig: Dilute 250mg in 250ml NS    0.9mg IV every 10 mins as needed    Dispense 1 bag (250mg = 25ml)   hydromorphone HCl 500 MG/50ML SOLN   No No   Sig: Dilute 250mg in 250ml NS    0.9mg IV every 10 mins as needed    Dispense 1 bag (250mg = 25ml)   hydromorphone HCl 500 MG/50ML SOLN   No No   Sig: Dilute 250mg in 250ml NS    0.9mg IV every 10 mins as needed    Dispense 1 bag (250mg = 25ml)   loperamide (IMODIUM) 2 MG capsule   Yes No   Sig: Take 2 mg by mouth as needed for diarrhea   naloxone (NARCAN) nasal spray   No No   Sig: Spray 1 spray (4 mg) into one nostril alternating nostrils as needed for opioid reversal every 2-3 minutes until assistance arrives   ondansetron (ZOFRAN-ODT) 8 MG ODT tab   No No   Sig: Take 1 tablet (8 mg) by mouth every 8 hours as needed for nausea   order for DME   No No   Sig: Equipment being ordered: home suction machine with tubing for connection to venting G-tube  Treatment Diagnosis: colon adenocarcinoma   pantoprazole (PROTONIX) 40 MG EC tablet   No No   Sig: Take 1  tablet (40 mg) by mouth every morning (before breakfast)   parenteral nutrition - PTA/DISCHARGE ORDER   Yes No   Sig: Inject into the vein daily FVHI   prochlorperazine (COMPAZINE) 10 MG tablet   Yes No   Sig: 10 mg every 6 hours as needed    sodium chloride 0.9% SOLN BOLUS   Yes No   Sig: Inject 1,000 mLs into the vein daily as needed For hydration.      Facility-Administered Medications: None     Allergies   Allergies   Allergen Reactions     Liquid Adhesive Rash     Dermabond, rash with pustules, occurred with abdominal surgery and port removal        Social History  Social History     Social History     Marital status: Single     Spouse name: N/A     Number of children: N/A     Years of education: N/A     Occupational History     Not on file.     Social History Main Topics     Smoking status: Never Smoker     Smokeless tobacco: Never Used     Alcohol use 3.0 oz/week     5 Cans of beer per week      Comment: none for last 4 months     Drug use: No     Sexual activity: Not on file     Other Topics Concern     Not on file     Social History Narrative       Family History  Family History   Problem Relation Age of Onset     Hypertension Father      Diabetes Father      - Family history reviewed & no other pertinent oncologic/hematologic malignancy noted    ROS  Comprehensive ROS was performed and was negative unless otherwise noted in above HPI.    Physical Exam  Temp:  [98.5  F (36.9  C)-99  F (37.2  C)] 99  F (37.2  C)  Pulse:  [106] 106  Resp:  [18] 18  BP: ()/(61-76) 96/61  SpO2:  [97 %-100 %] 98 %  146 lbs 14.4 oz    Constitutional: Awake, alert, cooperative, in NAD.  Eyes: PERRL, EOMI, sclera clear, conjunctiva normal.  ENT: Normocephalic, without obvious abnormality, oral pharynx with moist mucus membranes  Respiratory: Non-labored breathing, good air exchange, clear to auscultation bilaterally, no crackles or wheezing.  Cardiovascular: RRR, no murmur noted.  GI: + bowel sounds, soft, non-distended,  non-tender, no masses palpated, no hepatosplenomegaly.  Gtube with surrounding skin c/d/i. Ostomy in LUQ with liquid stool.  Skin: No concerning lesions or rash on exposed areas.  Musculoskeletal: No edema hallie LEs.  Neurologic: Awake, alert & oriented x3.  Cranial nerves II-XII are grossly intact.   Psych: appropriate affect    DATA  Results for orders placed or performed during the hospital encounter of 10/19/18 (from the past 24 hour(s))   CBC with platelets differential   Result Value Ref Range    WBC 16.6 (H) 4.0 - 11.0 10e9/L    RBC Count 2.53 (L) 4.4 - 5.9 10e12/L    Hemoglobin 6.4 (LL) 13.3 - 17.7 g/dL    Hematocrit 20.3 (L) 40.0 - 53.0 %    MCV 80 78 - 100 fl    MCH 25.3 (L) 26.5 - 33.0 pg    MCHC 31.5 31.5 - 36.5 g/dL    RDW 18.1 (H) 10.0 - 15.0 %    Platelet Count 411 150 - 450 10e9/L    Diff Method Automated Method     % Neutrophils 67.3 %    % Lymphocytes 14.3 %    % Monocytes 14.6 %    % Eosinophils 1.7 %    % Basophils 0.4 %    % Immature Granulocytes 1.7 %    Nucleated RBCs 0 0 /100    Absolute Neutrophil 11.2 (H) 1.6 - 8.3 10e9/L    Absolute Lymphocytes 2.4 0.8 - 5.3 10e9/L    Absolute Monocytes 2.4 (H) 0.0 - 1.3 10e9/L    Absolute Eosinophils 0.3 0.0 - 0.7 10e9/L    Absolute Basophils 0.1 0.0 - 0.2 10e9/L    Abs Immature Granulocytes 0.3 0 - 0.4 10e9/L    Absolute Nucleated RBC 0.0    Comprehensive metabolic panel   Result Value Ref Range    Sodium 130 (L) 133 - 144 mmol/L    Potassium 4.1 3.4 - 5.3 mmol/L    Chloride 99 94 - 109 mmol/L    Carbon Dioxide 23 20 - 32 mmol/L    Anion Gap 8 3 - 14 mmol/L    Glucose 97 70 - 99 mg/dL    Urea Nitrogen 11 7 - 30 mg/dL    Creatinine 0.83 0.66 - 1.25 mg/dL    GFR Estimate >90 >60 mL/min/1.7m2    GFR Estimate If Black >90 >60 mL/min/1.7m2    Calcium 8.1 (L) 8.5 - 10.1 mg/dL    Bilirubin Total 0.2 0.2 - 1.3 mg/dL    Albumin 1.6 (L) 3.4 - 5.0 g/dL    Protein Total 7.2 6.8 - 8.8 g/dL    Alkaline Phosphatase 204 (H) 40 - 150 U/L    ALT 22 0 - 70 U/L    AST 34 0 -  45 U/L   Lactic acid whole blood   Result Value Ref Range    Lactic Acid 1.3 0.7 - 2.0 mmol/L   Blood Culture ONE site   Result Value Ref Range    Specimen Description Blood Right Arm     Special Requests Received in aerobic bottle only     Culture Micro No growth after 3 hours    Blood culture   Result Value Ref Range    Specimen Description Blood Right Arm     Special Requests Received in aerobic bottle only     Culture Micro No growth after 1 hour    XR Chest 2 Views    Narrative    Exam: XR CHEST 2 VW, 10/19/2018 7:03 PM    Indication: SOB;     Comparison: Radiograph of the chest 2/23/2018    Findings:   PA and lateral views of the chest. Right arm PICC tip projects over  the low SVC. Cardiomediastinal silhouette is within normal limits.  Pulmonary vasculature is distinct. Streaky left lung base opacities.  No pleural effusion or pneumothorax. Percutaneous gastrostomy tube  balloon is inflated over the stomach.      Impression    Impression: New streaky opacities in the left lung base which are  suspicious for infection.     I have personally reviewed the examination and initial interpretation  and I agree with the findings.    FLORENTIN VILLELA MD       PCP & Hematologist/Oncologist  NGUYỄN HERNÁNDEZ  Onc- Ethan Law MD  Hematology/Oncology  October 19, 2018

## 2018-10-21 NOTE — PLAN OF CARE
Problem: Patient Care Overview  Goal: Plan of Care/Patient Progress Review  Outcome: No Change    AVSS, afebrile. Pain well controlled with PCA and fentanyl patches on RUE. TPN running @ 83mL/hr overnight. Blood culture from L arm positive for gram neg rods, MD aware. MIVF running @ 125mL/hr. IV zosyn given x1. Continue to monitor.

## 2018-10-21 NOTE — PROGRESS NOTES
Afebrile, VSS on RA. Dilaudid PCA 0.9 [id number] and fentanyl patches for pain. PEG tube clamped. Ileostomy intact, pt does cares himself. Good output. Voiding. Up independently. Blood cultures positive on purple port, PICC line. Gram negative rods. MD aware. PPN/Lipds infusing via PIV. MIVF 125ml/hr. On IV zosyn. A/Ox4, calling appropriately and making needs known. Will continue to monitor and follow POC.

## 2018-10-21 NOTE — PLAN OF CARE
VSS, afebrile. Pain well controlled with PCA, used 12.6mg dilaudid this shift. Fentanyl patches reinforced with Tegaderm. Up independently, minimal appetite, good urine output. G tube clamped all shift. Ileostomy 100% self care and no complaints. IV abx changed to cefepime. Continue with POC.

## 2018-10-21 NOTE — PLAN OF CARE
Pt refusing capnography, pt reeducated on the importance and reason for use. Pt continues to refuse. Currently on continuous pulse ox, vss. Continue to monitor.

## 2018-10-21 NOTE — PROGRESS NOTES
[facility], [place]    Hematology / Oncology Progress Note    Date of Admission: 10/19/2018  Hospital Day #: 1   Date of Service (when I saw the patient): 10/21/2018     Assessment & Plan   Sebas Lopez is a 54 y/o male with PMHx significant for ulcerative colitis and metastatic colon cancer s/p ileostomy, PEG tube placement and progression on multiple therapies. On a home PCA for pain management and requiring TPN nutrition support. He presents to the ED with ~1 week of fevers (up to 104 at home), chills, and generally feeling unwell. He saw his PCP about 3 days PTA and was started on Augmentin but his symptoms did not improve.     #Klebsiella pneumoniae bacteremia. Suspect PICC associated.   #Fevers/chills. Now afebrile.  #Sepsis/SIRS. Hypotensive, elevated WBC count, tachycardic, but afebrile.  Patient has had PICC in place since 2/2018. Over the past few days, whenever he would access PICC to administer TPN he would develop rigors. He did not access PICC or give TPN on the night prior to admission due to this. Fevers since around Sunday PTA. Highest was 104, but would defervesce quickly per patient report so he was not too concerned about them. Denies nausea/vomiting. No change in ostomy output amount/consistency. Denies dysuria or URI symptoms. Started on Augmentin ~3 days prior to admission with minimal improvement in symptoms.  -CXR with streaky opacities in LLL per report, although it is not very convincing. UA negative, UCx negative.  -BCx 10/19 from R arm positive for GNR identified as Klebsiella pneumoniae (2/2 bottles). BCx from 10/20 are also positive (2/2).   -PICC removed. Tip culture sent, pending.   -Blood Cx daily until negative Cx x3 days.   -Started on Zosyn-->switched to ceftriaxone as K pneumoniae resistant to Zosyn.  -IVF     #H/o ulcerative colitis.   #Metastatic colon cancer.   #S/p ileostomy for chronic bowel obstruction.  #S/p venting G-tube.   Patient has  progressed on multiple therapies. Most recently on panitumab and irinotecan (C3 9/21/18), however CT scan 10/4 with progression of disease including worsening of peritoneal carcinomatosis and primary colonic lesion. Per Dr. Long, further options are limited but discussed a few therapy options (Regorafenib, Lonsurf) of which the chances of response are low. Additionally discussed continuing palliative care with transition to hospice. The patient is currently weighing his options.   -Vents G tube about once daily.   -Continue diet of soft foods with TPN support. Pharmacy & nutrition to manage TPN, currently having to give through an PIV while PICC is out.   -UC not treated.   -Ileostomy output and consistency stable per patient, monitor.     #Chronic pain.   -Continue PTA home PCA dose: Dilaudid 0.9 mg q10 minutes prn, no continuous.   -Continue PTA Fentanyl patch 100 mcg/72hr.  -Follows with palliative care as an outpatient.     #Chronic nausea.   -Continue Zofran and compazine prn.    #Chronic hyponatremia.   -IVF NS as below.     #Anemia. Likely 2/2 chronic illness.   #Leukocytosis. Likely 2/2 infection.   -Transfuse for hgb <7. Received 1 unit PRBCx for admission hgb of 6.4.    FEN:  -IVF @ 50 ml/hr with TPN running  -PRN lyte replacement  -RDAT    Prophy/Misc:  -VTE: Lovenox ppx  -GI/PUD: PTA PPI    Code: Full     Dispo: Pending improvement of acute issues, resolution of bacteremia, and fevers, likely a 2-3 day stay.     Margarita Dougherty PA-C  Hematology/Oncology  Pager #[phone]    Interval History   Brain is feeling okay today. Denies overnight issues. Afebrile. Offers no new complaints today. Encouraged ambulating around the unit a few times, patient agreeable.    Physical Exam   Temp: 97.3  F (36.3  C) Temp src: Oral BP: 109/70 Pulse: 88   Resp: 20 SpO2: 97 % O2 Device: None (Room air)    Vitals:    10/19/18 2357 10/20/18 0814 10/21/18 0751   Weight: 65.9 kg (145 lb 4.8 oz) 66 kg (145 lb 9.6 oz) 67.6 kg (149 lb 1.6  oz)     Vital Signs with Ranges  Temp:  [97.3  F (36.3  C)-99.4  F (37.4  C)] 97.3  F (36.3  C)  Pulse:  [81-91] 88  Resp:  [15-20] 20  BP: ()/(62-70) 109/70  SpO2:  [95 %-100 %] 97 %  I/O last 3 completed shifts:  In: 2387.9 [I.V.:1557.5]  Out: 525 [Urine:525]    Constitutional: Pleasant male seen sitting up in bed, in NAD. Alert and interactive.   HEENT: NCAT, anicteric sclerae, conjunctiva clear. Moist mucous membranes.   Respiratory: Non-labored breathing, good air exchange. Lungs are clear to auscultation bilaterally, without wheezing, crackles or rhonchi. No cough noted.   Cardiovascular: Regular rate and rhythm with no murmur, rub or gallop.  GI: Quiet bowel sounds. Abdomen is mostly soft with a healed midline scar, non-distended, and non-tender to palpation. Ileostomy in RUQ and Venting G tube in LUQ, no surrounding erythema or drainage of either.  Skin: Warm and dry. Mild erythema and pruritis in location of old PICC on RUE, monitor.  Musculoskeletal: Extremities grossly normal. No tenderness or edema present.   Neurologic: A &O x3, speech normal, answering questions appropriately. Moves all extremities spontaneously. Grossly non-focal.  Neuropsychiatric: Mentation and affect normal/appropriate.    Medications   Current Facility-Administered Medications   Medication     ceFEPIme (MAXIPIME) 2 g vial to attach to  ml bag for ADULTS or 50 ml bag for PEDS     enoxaparin (LOVENOX) injection 40 mg     [START ON 10/22/2018] fentaNYL (DURAGESIC) 100 mcg/hr 72 hr patch 3 patch     fentaNYL (DURAGESIC) Patch in Place     [START ON 10/22/2018] fentaNYL (DURAGESIC) patch REMOVAL     guaiFENesin (ROBITUSSIN) 20 mg/mL solution 10 mL     heparin 100 UNIT/ML injection 5 mL     heparin lock flush 10 UNIT/ML injection 5-10 mL     heparin lock flush 10 UNIT/ML injection 5-10 mL     hydrocortisone 0.5 % cream     HYDROmorphone (DILAUDID) PCA 1 mg/mL OPIOID TOLERANT     lidocaine (LMX4) cream     lidocaine 1 % 1 mL      lipids (INTRALIPID) 20 % infusion 250 mL     loperamide (IMODIUM) capsule 2 mg     LORazepam (ATIVAN) tablet 0.5 mg     magnesium sulfate 4 g in 100 mL sterile water (premade)     Medication Instruction     naloxone (NARCAN) injection 0.1-0.4 mg     ondansetron (ZOFRAN) injection 8 mg    Or     ondansetron (ZOFRAN-ODT) ODT tab 8 mg    Or     ondansetron (ZOFRAN) tablet 8 mg     pantoprazole (PROTONIX) EC tablet 40 mg     parenteral nutrition - Clinimix E     potassium chloride (KLOR-CON) Packet 20-40 mEq     potassium chloride 10 mEq in 100 mL intermittent infusion with 10 mg lidocaine     potassium chloride 10 mEq in 100 mL sterile water intermittent infusion (premix)     potassium chloride 20 mEq in 50 mL intermittent infusion     potassium chloride SA (K-DUR/KLOR-CON M) CR tablet 20-40 mEq     potassium phosphate 15 mmol in D5W 250 mL intermittent infusion     potassium phosphate 20 mmol in D5W 250 mL intermittent infusion     potassium phosphate 20 mmol in D5W 500 mL intermittent infusion     potassium phosphate 25 mmol in D5W 500 mL intermittent infusion     prochlorperazine (COMPAZINE) tablet 10 mg    Or     prochlorperazine (COMPAZINE) injection 5 mg     sodium chloride (PF) 0.9% PF flush 10-20 mL     sodium chloride (PF) 0.9% PF flush 10-20 mL     sodium chloride 0.9% infusion       Data   CBC    Recent Labs  Lab 10/21/18  0552 10/20/18  0615 10/19/18  1645   WBC 14.7* 12.4* 16.6*   RBC 3.31* 3.08* 2.53*   HGB 8.9* 8.0* 6.4*   HCT 27.9* 25.4* 20.3*   MCV 84 83 80   MCH 26.9 26.0* 25.3*   MCHC 31.9 31.5 31.5   RDW 18.1* 18.3* 18.1*    368 411     CMP    Recent Labs  Lab 10/21/18  0552 10/20/18  0615 10/19/18  1645    135 130*   POTASSIUM 4.0 4.0 4.1   CHLORIDE 105 104 99   CO2 21 22 23   ANIONGAP 8 9 8   * 86 97   BUN 7 8 11   CR 0.78 0.86 0.83   GFRESTIMATED >90 >90 >90   GFRESTBLACK >90 >90 >90   ANNAMARIE 7.9* 8.0* 8.1*   MAG  --  1.8  --    PHOS  --  3.7  --    PROTTOTAL  --  6.5* 7.2    ALBUMIN  --  1.4* 1.6*   BILITOTAL  --  0.4 0.2   ALKPHOS  --  194* 204*   AST  --  26 34   ALT  --  18 22     INRNo lab results found in last 7 days.    Results for orders placed or performed during the hospital encounter of 10/19/18   XR Chest 2 Views    Narrative    Exam: XR CHEST 2 VW, 10/19/2018 7:03 PM    Indication: SOB;     Comparison: Radiograph of the chest 2/23/2018    Findings:   PA and lateral views of the chest. Right arm PICC tip projects over  the low SVC. Cardiomediastinal silhouette is within normal limits.  Pulmonary vasculature is distinct. Streaky left lung base opacities.  No pleural effusion or pneumothorax. Percutaneous gastrostomy tube  balloon is inflated over the stomach.      Impression    Impression: New streaky opacities in the left lung base which are  suspicious for infection.     I have personally reviewed the examination and initial interpretation  and I agree with the findings.    FLORENTIN VILLELA MD

## 2018-10-22 NOTE — PLAN OF CARE
Problem: Patient Care Overview  Goal: Plan of Care/Patient Progress Review  Outcome: No Change    AVSS, afebrile. Abdominal pain controlled with dilaudid PCA. Denies nausea. Voiding well. Pt self empties ileostomy. G tube clamped. PPN/lipids infusing. Fentanyl patches on left arm. Pt sleeping between cares. Cont POC.

## 2018-10-22 NOTE — PROGRESS NOTES
[facility], [place]    Hematology / Oncology Progress Note    Date of Admission: 10/19/2018  Hospital Day #: 3   Date of Service (when I saw the patient): 10/22/2018     Assessment & Plan   Sebas Lopez is a 52 y/o male with PMHx significant for ulcerative colitis and metastatic colon cancer s/p ileostomy, PEG tube placement and progression on multiple therapies. On a home PCA for pain management and requiring TPN nutrition support. He presents to the ED with ~1 week of fevers (up to 104 at home), chills, and generally feeling unwell. He saw his PCP about 3 days PTA and was started on Augmentin but his symptoms did not improve.     #Klebsiella pneumoniae bacteremia. Suspect PICC associated.   #Fevers/chills. Now afebrile.  #Sepsis/SIRS. Hypotensive, elevated WBC count, tachycardic, but afebrile.  Patient has had PICC in place since 2/2018. Over the past few days, whenever he would access PICC to administer TPN he would develop rigors. He did not access PICC or give TPN on the night prior to admission due to this. Fevers since around Sunday PTA. Highest was 104, but would defervesce quickly per patient report so he was not too concerned about them. Denies nausea/vomiting. No change in ostomy output amount/consistency. Denies dysuria or URI symptoms. Started on Augmentin ~3 days prior to admission with minimal improvement in symptoms.  -CXR with streaky opacities in LLL per report, although it is not very convincing. UA negative, UCx negative.  -BCx 10/19 from R arm positive for GNR identified as Klebsiella pneumoniae (2/2 bottles). BCx from 10/20 are also positive (2/2). Now 48 hours with negative cultures.   -PICC removed. Tip culture sent, + for k pneumoniae.   -Blood Cx daily until negative Cx x3 days.   -Started on Zosyn-->switched to ceftriaxone as K pneumoniae resistant to Zosyn.  -IVF   - pt will need IV antibiotics x 14 days from 10/20 -- will need new PICC placed after  negative cultures x 72 hours. Anticipate this will be placed tomorrow 10/23.     #H/o ulcerative colitis.   #Metastatic colon cancer.   #S/p ileostomy for chronic bowel obstruction.  #S/p venting G-tube.   Patient has progressed on multiple therapies. Most recently on panitumab and irinotecan (C3 9/21/18), however CT scan 10/4 with progression of disease including worsening of peritoneal carcinomatosis and primary colonic lesion. Per Dr. Long, further options are limited but discussed a few therapy options (Regorafenib, Lonsurf) of which the chances of response are low. Additionally discussed continuing palliative care with transition to hospice. The patient is currently weighing his options.   -Vents G tube about once daily.   -Continue diet of soft foods with TPN support. Pharmacy & nutrition to manage TPN, currently having to give through an PIV while PICC is out.   -UC not treated.   -Ileostomy output and consistency stable per patient, monitor.     #Chronic pain.   -Continue PTA home PCA dose: Dilaudid 0.9 mg q10 minutes prn, no continuous.   -Continue PTA Fentanyl patch 100 mcg/72hr.  -Follows with palliative care as an outpatient.     #Chronic nausea.   -Continue Zofran and compazine prn.    #Chronic hyponatremia.   -IVF NS as below.     #Anemia. Likely 2/2 chronic illness.   #Leukocytosis. Likely 2/2 infection.   -Transfuse for hgb <7. Received 1 unit PRBCx for admission hgb of 6.4.    FEN:  -IVF @ 50 ml/hr with TPN running  -PRN lyte replacement  -RDAT    Prophy/Misc:  -VTE: Lovenox ppx  -GI/PUD: PTA PPI    Code: Full     Dispo: Pending improvement of acute issues, resolution of bacteremia, and fevers, anticipate discharge tomorrow ( 10/23).  Will need PICC placed prior to discharge.     Discussed with staff attending, Dr. Babcock.     Es Dumont, DNP, APRN, CNP  Hematology/oncology  [id number]     Interval History   Sebas is feeling well this morning. Afebrile overnight.  Denies headaches, chest pain, SOB,  nausea/vomiting. He is eating/drinking ok. TPN/lipids infusing. Pain is managed via PCA.  No acute events overnight. Hopeful to discharge tomorrow.     Physical Exam   Temp: 98.1  F (36.7  C) Temp src: Oral BP: 124/82 Pulse: 86   Resp: 18 SpO2: 97 % O2 Device: None (Room air)    Vitals:    10/20/18 0814 10/21/18 0751 10/22/18 0755   Weight: 66 kg (145 lb 9.6 oz) 67.6 kg (149 lb 1.6 oz) 68 kg (149 lb 14.6 oz)     Vital Signs with Ranges  Temp:  [97.3  F (36.3  C)-98.7  F (37.1  C)] 98.1  F (36.7  C)  Pulse:  [86-93] 86  Resp:  [18-20] 18  BP: (109-124)/(68-82) 124/82  SpO2:  [95 %-98 %] 97 %  I/O last 3 completed shifts:  In: 2688 [I.V.:1650]  Out: 1400 [Urine:1400]    Constitutional: Pleasant male seen sitting up in bed, in NAD. Alert and interactive.   HEENT: NCAT, anicteric sclerae, conjunctiva clear. Moist mucous membranes.   Respiratory: Non-labored breathing, good air exchange. Lungs are clear to auscultation bilaterally, without wheezing, crackles or rhonchi. No cough noted.   Cardiovascular: Regular rate and rhythm with no murmur, rub or gallop.  GI: Quiet bowel sounds. Abdomen is mostly soft with a healed midline scar, non-distended, and non-tender to palpation. Ileostomy in RUQ and Venting G tube in LUQ, no surrounding erythema or drainage of either.  Skin: Warm and dry. Mild erythema and pruritis in location of old PICC on RUE, monitor.  Musculoskeletal: Extremities grossly normal. No tenderness or edema present.   Neurologic: A &O x3, speech normal, answering questions appropriately. Moves all extremities spontaneously. Grossly non-focal.  Neuropsychiatric: Mentation and affect normal/appropriate.    Medications   Current Facility-Administered Medications   Medication     cefTRIAXone (ROCEPHIN) 1 g vial to attach to  mL bag for ADULTS or NS 50 mL bag for PEDS     enoxaparin (LOVENOX) injection 40 mg     fentaNYL (DURAGESIC) 100 mcg/hr 72 hr patch 3 patch     fentaNYL (DURAGESIC) Patch in Place      fentaNYL (DURAGESIC) patch REMOVAL     guaiFENesin (ROBITUSSIN) 20 mg/mL solution 10 mL     heparin 100 UNIT/ML injection 5 mL     heparin lock flush 10 UNIT/ML injection 5-10 mL     heparin lock flush 10 UNIT/ML injection 5-10 mL     hydrocortisone 0.5 % cream     HYDROmorphone (DILAUDID) PCA 1 mg/mL OPIOID TOLERANT     lidocaine (LMX4) cream     lidocaine 1 % 1 mL     lipids (INTRALIPID) 20 % infusion 250 mL     loperamide (IMODIUM) capsule 2 mg     LORazepam (ATIVAN) tablet 0.5 mg     magnesium sulfate 4 g in 100 mL sterile water (premade)     Medication Instruction     naloxone (NARCAN) injection 0.1-0.4 mg     ondansetron (ZOFRAN) injection 8 mg    Or     ondansetron (ZOFRAN-ODT) ODT tab 8 mg    Or     ondansetron (ZOFRAN) tablet 8 mg     pantoprazole (PROTONIX) EC tablet 40 mg     parenteral nutrition - Clinimix E     potassium chloride (KLOR-CON) Packet 20-40 mEq     potassium chloride 10 mEq in 100 mL intermittent infusion with 10 mg lidocaine     potassium chloride 10 mEq in 100 mL sterile water intermittent infusion (premix)     potassium chloride 20 mEq in 50 mL intermittent infusion     potassium chloride SA (K-DUR/KLOR-CON M) CR tablet 20-40 mEq     potassium phosphate 15 mmol in D5W 250 mL intermittent infusion     potassium phosphate 20 mmol in D5W 250 mL intermittent infusion     potassium phosphate 20 mmol in D5W 500 mL intermittent infusion     potassium phosphate 25 mmol in D5W 500 mL intermittent infusion     prochlorperazine (COMPAZINE) tablet 10 mg    Or     prochlorperazine (COMPAZINE) injection 5 mg     sodium chloride (PF) 0.9% PF flush 10-20 mL     sodium chloride (PF) 0.9% PF flush 10-20 mL     sodium chloride 0.9% infusion       Data   CBC    Recent Labs  Lab 10/22/18  0548 10/21/18  0552 10/20/18  0615 10/19/18  1645   WBC 13.3* 14.7* 12.4* 16.6*   RBC 3.32* 3.31* 3.08* 2.53*   HGB 8.7* 8.9* 8.0* 6.4*   HCT 27.8* 27.9* 25.4* 20.3*   MCV 84 84 83 80   MCH 26.2* 26.9 26.0* 25.3*   MCHC  31.3* 31.9 31.5 31.5   RDW 18.2* 18.1* 18.3* 18.1*   * 443 368 411     CMP    Recent Labs  Lab 10/22/18  0548 10/21/18  0552 10/20/18  0615 10/19/18  1645    134 135 130*   POTASSIUM 3.9 4.0 4.0 4.1   CHLORIDE 103 105 104 99   CO2 24 21 22 23   ANIONGAP 7 8 9 8   * 102* 86 97   BUN 9 7 8 11   CR 0.65* 0.78 0.86 0.83   GFRESTIMATED >90 >90 >90 >90   GFRESTBLACK >90 >90 >90 >90   ANNAMARIE 8.1* 7.9* 8.0* 8.1*   MAG  --   --  1.8  --    PHOS  --   --  3.7  --    PROTTOTAL  --   --  6.5* 7.2   ALBUMIN  --   --  1.4* 1.6*   BILITOTAL  --   --  0.4 0.2   ALKPHOS  --   --  194* 204*   AST  --   --  26 34   ALT  --   --  18 22     INRNo lab results found in last 7 days.    Results for orders placed or performed during the hospital encounter of 10/19/18   XR Chest 2 Views    Narrative    Exam: XR CHEST 2 VW, 10/19/2018 7:03 PM    Indication: SOB;     Comparison: Radiograph of the chest 2/23/2018    Findings:   PA and lateral views of the chest. Right arm PICC tip projects over  the low SVC. Cardiomediastinal silhouette is within normal limits.  Pulmonary vasculature is distinct. Streaky left lung base opacities.  No pleural effusion or pneumothorax. Percutaneous gastrostomy tube  balloon is inflated over the stomach.      Impression    Impression: New streaky opacities in the left lung base which are  suspicious for infection.     I have personally reviewed the examination and initial interpretation  and I agree with the findings.    FLORENTIN VILLELA MD

## 2018-10-22 NOTE — PLAN OF CARE
Problem: Pain, Acute (Adult)  Goal: Identify Related Risk Factors and Signs and Symptoms  Related risk factors and signs and symptoms are identified upon initiation of [employer] Clinical Practice Guideline (CPG).     8031-6892:  VSS, afebrile. PCA continues, abdominal pain comfortably managed. Fentanyl patches x3 R. Arm. G-tube clamped. Ileostomy patent w/ adequate output; patient self cares. Continue to monitor & w/ POC.

## 2018-10-22 NOTE — PLAN OF CARE
Vss. Pain controlled with PCA. Fentanyl patches on Left arm. illeostomy and g tube are all self care and patent. IV nutrition continues. Continue to monitor.

## 2018-10-22 NOTE — PROGRESS NOTES
Care Coordinator Progress Note    Admission Date/Time:  10/19/2018  Attending MD:  Scotty Babcock DO    Data  Chart reviewed, discussed with interdisciplinary team.   Patient was admitted for:    Anemia in neoplastic disease  Fever, unspecified fever cause  Pneumonia of left lower lobe due to infectious organism (H)  Cancer of sigmoid colon (H).    Concerns with insurance coverage for discharge needs: None.  Current Living Situation: Patient lives with spouse.  Support System: Supportive and Involved  Services Involved: Home Care and Home Infusion  Transportation at Discharge: Family or friend will provide  Transportation to Medical Appointments:   - Not applicable  Barriers to Discharge: chronically ill, awaiting negative cultures x3 days to replace PICC    Coordination of Care and Referrals: Patient was on service with [facility] ([facility]) for TPN and home Dilaudid PCA prior to admission. [facility] provides home RN visits and will receive resumption orders by [facility].  Per Heme/Onc team, patient will need IV antibiotics upon discharge as well. Patient feels comfortable learning home IV abx skills and has no questions at this time.  [facility] updated on pending discharge plan.     Assessment  Patient with metastatic colon cancer admitted with fevers and suspected bacteremia.  PICC pulled and patient currently on PPN for nutrition support. He continues on Dilaudid PCA for chronic pain.     Plan  Anticipated Discharge Date:  Tues 10/23 if cultures remain negative and PICC placed  Anticipated Discharge Plan:  home    Karime DURHAM Heme/Onc RN Care Coordinator  Pager [phone]

## 2018-10-23 NOTE — PROGRESS NOTES
Discharge  D: Orders for discharge and outpatient medications written.  I: Home medications and return to clinic schedule reviewed with patient. Discharge instructions and parameters for calling Health Care Provider reviewed. Patient left at 1730 accompanied by parents. All belongings sent with patient.  A: Patient/family verbalized understanding and was ready for discharge.   P: Home infusion met with patient to set up medication management.

## 2018-10-23 NOTE — PLAN OF CARE
Problem: Patient Care Overview  Goal: Plan of Care/Patient Progress Review  Pt's vitals stable. Alert and oriented x4. Denies pain. PCA continues, fentanyl patches x3 on L arm. Pt received PICC line on left side. Pt independent for cares. Discharge this afternoon after home infusion visit.

## 2018-10-23 NOTE — PLAN OF CARE
Problem: Patient Care Overview  Goal: Plan of Care/Patient Progress Review  Outcome: No Change    [id number]: Triggered sepsis d/t HR, lactic orderd. Pain well controlled with dilaudid PCA. PPN and lipids infusing. G tube open to gravity. Pt self caring for g tube and ileostomy. Continue with POC.

## 2018-10-23 NOTE — PROGRESS NOTES
Care Coordinator - Discharge Planning    Admission Date/Time:  10/19/2018  Attending MD:  Scotty Babcock,      Data  Date of initial CC assessment:  10/22  Chart reviewed, discussed with interdisciplinary team.   Patient was admitted for:   1. Infection due to Klebsiella pneumoniae    2. Anemia in neoplastic disease    3. Fever, unspecified fever cause    4. Pneumonia of left lower lobe due to infectious organism (H)    5. Cancer of sigmoid colon (H)         Assessment   Full assessment completed in previous note. Patient is medically stable for discharge per Heme/Onc team.    Coordination of Care and Referrals: Met with patient to review discharge planning. [facility] updated and Dilaudid PCA script faxed. Home TPN will resume and new IV antibiotics. Patient needing brief re-teach of IV abx by \[facility] [place] [facility]\"" or home nurse.  Patient will resume home RN visits through [facility].        Plan  Anticipated Discharge Date:  today  Anticipated Discharge Plan:  home    CTS Handoff completed:  YES    Karime Junior  7D Heme/Onc RN Care Coordinator  Pager [phone]

## 2018-10-23 NOTE — DISCHARGE SUMMARY
[facility], [place]    Discharge Summary  Hematology / Oncology    Date of Admission:  10/19/2018  Date of Discharge:  10/23/2018  Discharging Provider: ALEXA Juan CNP    Discharge Diagnoses   Active Problems:    Peritoneal carcinomatosis (H)    Cancer of sigmoid colon (H)    Large bowel obstruction (H)    Colon cancer (H)    Colon adenocarcinoma (H)    Neoplasm related pain    Small bowel obstruction (H)    Pneumonia of left lower lobe due to infectious organism (H)    Pneumonia      History of Present Illness   Sebas Lopez is a 52 y/o male with PMHx significant for ulcerative colitis and metastatic colon cancer s/p ileostomy, PEG tube placement and progression on multiple therapies. On a home PCA for pain management and requiring TPN nutrition support. He presents to the ED with ~1 week of fevers (up to 104 at home), chills, and generally feeling unwell. He saw his PCP about 3 days PTA and was started on Augmentin but his symptoms did not improve.  He was admitted with sepsis/sirs. Blood cultures positive for PICC associated klebsiella pneumoniae bacteremia. He was treated with IV ceftriaxone, per sensitivities.  Cultures now negative since 10/20.  PICC replaced on 10/23.  He will now discharge home with IV ceftriaxone daily x 14 days (through 11/3).  He will also discharge with TPN/lipids and home dilaudid PCA as pta.  Follow up requested via [employer] for ELIF/lab hospital follow up visit within 1-2 weeks of discharge.  Patient verbalized understanding of discharge plan.       Hospital Course   Sebas Lopez was admitted on 10/19/2018.  The following problems were addressed during his hospitalization:    #Klebsiella pneumoniae bacteremia. Suspect PICC associated.   #Fevers/chills. Now afebrile.  #Sepsis/SIRS. Hypotensive, elevated WBC count, tachycardic, but afebrile.  Patient has had PICC in place since 2/2018. Over the past few days, whenever he would access  PICC to administer TPN he would develop rigors. He did not access PICC or give TPN on the night prior to admission due to this. Fevers since around Sunday PTA. Highest was 104, but would defervesce quickly per patient report so he was not too concerned about them. Denies nausea/vomiting. No change in ostomy output amount/consistency. Denies dysuria or URI symptoms. Started on Augmentin ~3 days prior to admission with minimal improvement in symptoms.  -BCx 10/19 from R arm positive for GNR identified as Klebsiella pneumoniae (2/2 bottles). BCx from 10/20 are also positive (2/2). Now 72 hours with negative cultures.   -PICC removed. Tip culture sent, + for k pneumoniae.   -Started on Zosyn-->switched to ceftriaxone as K pneumoniae resistant to Zosyn.  - pt will need IV ceftriaxone x 14 days from 10/20 -- will place PICC prior to discharge.      #H/o ulcerative colitis.   #Metastatic colon cancer.   #S/p ileostomy for chronic bowel obstruction.  #S/p venting G-tube.   Patient has progressed on multiple therapies. Most recently on panitumab and irinotecan (C3 9/21/18), however CT scan 10/4 with progression of disease including worsening of peritoneal carcinomatosis and primary colonic lesion. Per Dr. Long, further options are limited but discussed a few therapy options (Regorafenib, Lonsurf) of which the chances of response are low. Additionally discussed continuing palliative care with transition to hospice. The patient is currently weighing his options.   -Vents G tube about once daily.   -Continue diet of soft foods with TPN support. Pharmacy & nutrition to manage TPN, currently having to give through an PIV while PICC is out.   -UC not treated.   -Ileostomy output and consistency stable per patient, monitor.      #Chronic pain.   -Continue PTA home PCA dose: Dilaudid 0.9 mg q10 minutes prn, no continuous.   -Continue PTA Fentanyl patch 100 mcg/72hr.  -Follows with palliative care as an outpatient.      #Chronic  nausea.   -Continue Zofran and compazine prn.     #Chronic hyponatremia.   -IVF NS as below.      #Anemia. Likely 2/2 chronic illness.   #Leukocytosis. Likely 2/2 infection.   -Transfuse for hgb <7. Received 1 unit PRBCx for admission hgb of 6.4.     FEN:  -TPN/lipids, IVF PRN outpatient   -PRN lyte replacement  -RDAT     Prophy/Misc:  -GI/PUD: PTA PPI -- pt has not been taking at home, ok to stop at this time.  Would consider restarting if on steroids, worsening abd symptoms     Discussed with staff attending, Dr. Babcock.     Es Dumont, DNP, APRN, CNP   Hematology/oncology  [id number]     Significant Results and Procedures   N/a     Pending Results   These results will be followed up by primary team in [facility].   Unresulted Labs Ordered in the Past 30 Days of this Admission     Date and Time Order Name Status Description    10/22/2018 2330 Blood culture Preliminary     10/22/2018 2330 Blood culture Preliminary     10/21/2018 2330 Blood culture Preliminary     10/21/2018 2330 Blood culture Preliminary     10/20/2018 2330 Blood culture Preliminary     10/20/2018 2330 Blood culture Preliminary           Code Status   Full Code    Primary Care Physician   NGUYỄN HERNÁNDEZ    Physical Exam   Temp: 98  F (36.7  C) Temp src: Oral BP: 101/69 Pulse: 90   Resp: 18 SpO2: 97 % O2 Device: None (Room air)    Vitals:    10/21/18 0751 10/22/18 0755 10/23/18 0700   Weight: 67.6 kg (149 lb 1.6 oz) 68 kg (149 lb 14.6 oz) 67 kg (147 lb 11.3 oz)     Vital Signs with Ranges  Temp:  [96.3  F (35.7  C)-99.1  F (37.3  C)] 98  F (36.7  C)  Pulse:  [] 90  Resp:  [16-18] 18  BP: (100-113)/(65-72) 101/69  SpO2:  [95 %-98 %] 97 %  I/O last 3 completed shifts:  In: 3161 [I.V.:940]  Out: 1475 [Urine:1475]    Constitutional: Pleasant male seen sitting up in bed, in NAD. Alert and interactive.   HEENT: NCAT, anicteric sclerae, conjunctiva clear. Moist mucous membranes.   Respiratory: Non-labored breathing, good air exchange. Lungs are  clear to auscultation bilaterally, without wheezing, crackles or rhonchi. No cough noted.   Cardiovascular: Regular rate and rhythm with no murmur, rub or gallop.  GI: Quiet bowel sounds. Abdomen is mostly soft with a healed midline scar, non-distended, and non-tender to palpation. Ileostomy in RUQ and Venting G tube in LUQ, no surrounding erythema or drainage of either.  Skin: Warm and dry. Mild erythema and pruritis in location of old PICC on RUE, monitor.  Musculoskeletal: Extremities grossly normal. No tenderness or edema present.   Neurologic: A &O x3, speech normal, answering questions appropriately. Moves all extremities spontaneously. Grossly non-focal.  Neuropsychiatric: Mentation and affect normal/appropriate.    Time Spent on this Encounter   IEs, personally saw the patient today and spent greater than 30 minutes discharging this patient.    Discharge Disposition   Discharged to home  Condition at discharge: Stable    Consultations This Hospital Stay   NUTRITION SERVICES ADULT IP CONSULT  VASCULAR ACCESS CARE ADULT IP CONSULT  PHARMACY/NUTRITION TO START AND MANAGE TPN  PHARMACY IP CONSULT  VASCULAR ACCESS CARE ADULT IP CONSULT  VASCULAR ACCESS ADULT IP CONSULT    Discharge Orders     **CBC with platelets FUTURE anytime   Last Lab Result: Hemoglobin (g/dL)      Date                     Value                10/23/2018               8.5 (L)          ----------     **Basic metabolic panel FUTURE anytime     Home infusion referral     Reason for your hospital stay   You were admitted with fevers and were found to have an infection in your blood stream from your PICC line.     Follow Up and recommended labs and tests   Follow up as scheduled     Activity   Your activity upon discharge: as tolerated     When to contact your care team   [facility] triage line at [phone] for temp >100.4, uncontrolled nausea/vomiting/diarrhea/constipation, unrelieved pain, bleeding not  relieved with pressure, dizziness, chest pain, shortness of breath, loss of consciousness, and any new or concerning symptoms.     IV access   **Ordering Provider MUST call/page Care Coordinator/ to discuss arranging this service**    You are going home with the following vascular access device: PICC.     Full Code     Diet   Follow this diet upon discharge: Orders Placed This Encounter     Regular Diet Adult, TPN/lipids       Discharge Medications   Current Discharge Medication List      START taking these medications    Details   cefTRIAXone (ROCEPHIN) 1 GM vial Inject 1 g into the vein every 24 hours for 11 days  Qty: 110 mL, Refills: 0    Associated Diagnoses: Infection due to Klebsiella pneumoniae         CONTINUE these medications which have NOT CHANGED    Details   fentaNYL (DURAGESIC) 100 mcg/hr 72 hr patch Place 3 patches onto the skin every 72 hours remove old patch.  Qty: 30 patch, Refills: 0    Associated Diagnoses: Colon adenocarcinoma (H); Neoplasm related pain; Peritoneal carcinomatosis (H)      !! hydromorphone HCl 500 MG/50ML SOLN Dilute 250mg in 250ml NS    0.9mg IV every 10 mins as needed    Dispense 1 bag (250mg = 25ml)  Qty: 25 mL, Refills: 0    Associated Diagnoses: Neoplasm related pain      loperamide (IMODIUM) 2 MG capsule Take 2 mg by mouth as needed for diarrhea      LORazepam (ATIVAN) 0.5 MG tablet Take 1 tablet (0.5 mg) by mouth every 4 hours as needed (Anxiety, Nausea/Vomiting or Sleep)  Qty: 30 tablet, Refills: 5    Associated Diagnoses: Peritoneal carcinomatosis (H); Cancer of sigmoid colon (H)      ondansetron (ZOFRAN-ODT) 8 MG ODT tab Take 1 tablet (8 mg) by mouth every 8 hours as needed for nausea  Qty: 60 tablet, Refills: 5    Associated Diagnoses: Nausea      prochlorperazine (COMPAZINE) 10 MG tablet 10 mg every 6 hours as needed       !! hydromorphone HCl 500 MG/50ML SOLN Dilute 250mg in 250ml NS    0.9mg IV every 10 mins as needed    Dispense 1 bag (250mg =  25ml)  Qty: 25 mL, Refills: 0    Associated Diagnoses: Neoplasm related pain      !! hydromorphone HCl 500 MG/50ML SOLN Dilute 250mg in 250ml NS    0.9mg IV every 10 mins as needed    Dispense 1 bag (250mg = 25ml)  Qty: 25 mL, Refills: 0    Associated Diagnoses: Neoplasm related pain      !! hydromorphone HCl 500 MG/50ML SOLN Dilute 250mg in 250ml NS    0.9mg IV every 10 mins as needed    Dispense 1 bag (250mg = 25ml)  Qty: 25 mL, Refills: 0    Associated Diagnoses: Neoplasm related pain      !! hydromorphone HCl 500 MG/50ML SOLN Dilute 250mg in 250ml NS    0.9mg IV every 10 mins as needed    Dispense 1 bag (250mg = 25ml)  Qty: 25 mL, Refills: 0    Associated Diagnoses: Neoplasm related pain      !! hydromorphone HCl 500 MG/50ML SOLN Dilute 250mg in 250ml NS    0.9mg IV every 10 mins as needed    Dispense 1 bag (250mg = 25ml)  Qty: 25 mL, Refills: 0    Associated Diagnoses: Neoplasm related pain      naloxone (NARCAN) nasal spray Spray 1 spray (4 mg) into one nostril alternating nostrils as needed for opioid reversal every 2-3 minutes until assistance arrives  Qty: 0.2 mL, Refills: 0    Associated Diagnoses: Cancer of sigmoid colon (H); Long term current use of opiate analgesic      order for DME Equipment being ordered: home suction machine with tubing for connection to venting G-tube  Treatment Diagnosis: colon adenocarcinoma  Qty: 1 Device, Refills: 0    Associated Diagnoses: Colon adenocarcinoma (H); Small bowel obstruction (H)      parenteral nutrition - PTA/DISCHARGE ORDER Inject into the vein daily FVHI      sodium chloride 0.9% SOLN BOLUS Inject 1,000 mLs into the vein daily as needed For hydration.  Qty: 1000 mL, Refills: 0       !! - Potential duplicate medications found. Please discuss with provider.      STOP taking these medications       dexamethasone (DECADRON) 1 MG tablet Comments:   Reason for Stopping:         dexamethasone (DECADRON) 4 MG tablet Comments:   Reason for Stopping:          pantoprazole (PROTONIX) 40 MG EC tablet Comments:   Reason for Stopping:             Allergies   Allergies   Allergen Reactions     Liquid Adhesive Rash     Dermabond, rash with pustules, occurred with abdominal surgery and port removal      Data   Most Recent 3 CBC's:  Recent Labs   Lab Test  10/23/18   0629  10/22/18   0548  10/21/18   0552   WBC  11.9*  13.3*  14.7*   HGB  8.5*  8.7*  8.9*   MCV  83  84  84   PLT  583*  534*  443      Most Recent 3 BMP's:  Recent Labs   Lab Test  10/23/18   0629  10/22/18   0548  10/21/18   0552   NA  135  134  134   POTASSIUM  4.0  3.9  4.0   CHLORIDE  103  103  105   CO2  24  24  21   BUN  10  9  7   CR  0.65*  0.65*  0.78   ANIONGAP  8  7  8   ANNAMARIE  8.4*  8.1*  7.9*   GLC  106*  102*  102*     Most Recent 2 LFT's:  Recent Labs   Lab Test  10/20/18   0615  10/19/18   1645   AST  26  34   ALT  18  22   ALKPHOS  194*  204*   BILITOTAL  0.4  0.2     Most Recent INR's and Anticoagulation Dosing History:  Anticoagulation Dose History     Recent Dosing and Labs Latest Ref Rng & Units 2/21/2018 3/18/2018 7/5/2018 7/30/2018 7/31/2018 7/31/2018 8/6/2018    INR 0.86 - 1.14 1.27(H) 1.12 1.49(H) 1.04 1.07 1.06 1.10        Most Recent 3 Troponin's:No lab results found.  Most Recent Cholesterol Panel:  Recent Labs   Lab Test  08/06/18   0533   TRIG  80     Most Recent 6 Bacteria Isolates From Any Culture (See EPIC Reports for Culture Details):  Recent Labs   Lab Test  10/23/18   0631  10/23/18   0629  10/22/18   0553  10/22/18   0548  10/21/18   0557  10/21/18   0552   CULT  PENDING  PENDING  No growth after 1 day  No growth after 1 day  No growth after 2 days  No growth after 2 days     Most Recent TSH, T4 and A1c Labs:No lab results found.  Results for orders placed or performed during the hospital encounter of 10/19/18   XR Chest 2 Views    Narrative    Exam: XR CHEST 2 VW, 10/19/2018 7:03 PM    Indication: SOB;     Comparison: Radiograph of the chest 2/23/2018    Findings:   PA and  lateral views of the chest. Right arm PICC tip projects over  the low SVC. Cardiomediastinal silhouette is within normal limits.  Pulmonary vasculature is distinct. Streaky left lung base opacities.  No pleural effusion or pneumothorax. Percutaneous gastrostomy tube  balloon is inflated over the stomach.      Impression    Impression: New streaky opacities in the left lung base which are  suspicious for infection.     I have personally reviewed the examination and initial interpretation  and I agree with the findings.    FLORENTIN VILLELA MD

## 2018-10-23 NOTE — PLAN OF CARE
Problem: Patient Care Overview  Goal: Plan of Care/Patient Progress Review  Outcome: No Change  VS stable, patient A/O x4. No acute events over night. PCA continues, fentanyl patches x3 on L arm. Had triggered sepsis on previous shift, lactate came back .5. Continue plan of care.

## 2018-10-24 NOTE — PROGRESS NOTES
This is a recent snapshot of the patient's [facility] medical record.  For current drug dose and complete information and questions, call [phone] or In Basket pool, fv home infusion ([id number])  CSN Number:  [id number]

## 2018-10-26 NOTE — PROGRESS NOTES
This is a recent snapshot of the patient's [facility] medical record.  For current drug dose and complete information and questions, call [phone] or In Basket pool, fv home infusion ([id number])  CSN Number:  [id number]

## 2018-10-31 NOTE — PATIENT INSTRUCTIONS
Recommendations from today's MTM visit:                                                    MTM (medication therapy management) is a service provided by a clinical pharmacist designed to help you get the most of out of your medicines.   Today we reviewed what your medicines are for, how to know if they are working, that your medicines are safe and how to make your medicine regimen as easy as possible.     1. I will ask Dr Long and Linda Alves whether it would be good to restart the dexamethasone and pantoprazole.    To schedule another MTM appointment, please call the clinic directly or you may call the MTM scheduling line at [phone] or toll-free at [phone].     My Clinical Pharmacist's contact information:                                                      It was a pleasure talking with you today!  Please feel free to contact me with any questions or concerns you have.      Sherry Coronel, PharmD, BCOP, BCPS  Clinical Pharmacy Specialist/  Oncology Medication Therapy Management Pharmacist  [facility]  Pager [phone]  Phone [phone]          You may receive a survey about the MTM services you received.  I would appreciate your feedback to help me serve you better in the future. Please fill it out and return it when you can. Your comments will be anonymous.

## 2018-10-31 NOTE — MR AVS SNAPSHOT
After Visit Summary   10/31/2018    Sebas Lopez    MRN: [id number]           Patient Information     Date Of Birth          1965        Visit Information        Provider Department      10/31/2018 10:30 AM Sherry Coronel, [facility] Medication Therapy Management        Today's Diagnoses     Bacteremia    -  1    Cancer associated pain        Gastroesophageal reflux disease, esophagitis presence not specified          Care Instructions    Recommendations from today's MTM visit:                                                    MTM (medication therapy management) is a service provided by a clinical pharmacist designed to help you get the most of out of your medicines.   Today we reviewed what your medicines are for, how to know if they are working, that your medicines are safe and how to make your medicine regimen as easy as possible.     1. I will ask Dr Long and Linda Alves whether it would be good to restart the dexamethasone and pantoprazole.    To schedule another MTM appointment, please call the clinic directly or you may call the MTM scheduling line at [phone] or toll-free at [phone].     My Clinical Pharmacist's contact information:                                                      It was a pleasure talking with you today!  Please feel free to contact me with any questions or concerns you have.      Sherry Coronel, PharmD, BCOP, BCPS  Clinical Pharmacy Specialist/  Oncology Medication Therapy Management Pharmacist  [facility]  Pager [phone]  Phone [phone]          You may receive a survey about the MTM services you received.  I would appreciate your feedback to help me serve you better in the future. Please fill it out and return it when you can. Your comments will be anonymous.              Follow-ups after your visit        Your next 10 appointments already scheduled     Nov 05, 2018 10:30 AM [facility]   [facility] Lab Draw with  [facility] LAB DRAW   M  [facility] Lab Draw ([facility])    [address]  [address]  [place] [address] [phone]            Nov 05, 2018 11:10 AM CST   (Arrive by 10:55 AM)   Return Visit with Linda Alves PA-C   [facility] ([facility])    [address]  [address]  [place] [id number] [phone]            Nov 14, 2018  9:30 AM CST   (Arrive by 9:15 AM)   Return Visit with Lorenzo Trujillo MD   [facility] ([facility])    [address]  [address]  [place] [id number] [phone]              Who to contact     If you have questions or need follow up information about today's clinic visit or your schedule please contact [facility] MEDICATION THERAPY MANAGEMENT directly at [phone].  Normal or non-critical lab and imaging results will be communicated to you by Foodflyhart, letter or phone within 4 business days after the clinic has received the results. If you do not hear from us within 7 days, please contact the clinic through Buru Burut or phone. If you have a critical or abnormal lab result, we will notify you by phone as soon as possible.  Submit refill requests through Flareo or call your pharmacy and they will forward the refill request to us. Please allow 3 business days for your refill to be completed.          Additional Information About Your Visit        Foodflyhart Information     Flareo gives you secure access to your electronic health record. If you see a primary care provider, you can also send messages to your care team and make appointments. If you have questions, please call your primary care clinic.  If you do not have a primary care provider, please call [phone] and they will assist you.        Care EveryWhere ID     This is your Care EveryWhere ID. This could be used by other organizations to access your [facility]  records  [id number]         Blood Pressure from Last 3 Encounters:   10/23/18 101/62   10/04/18 107/69   10/04/18 107/69    Weight from Last 3 Encounters:   10/23/18 147 lb 11.3 oz (67 kg)   10/04/18 141 lb 12 oz (64.3 kg)   10/04/18 141 lb 12.8 oz (64.3 kg)              Today, you had the following     No orders found for display       Primary Care Provider Office Phone # Fax #    Jaguar Becker [phone]       [facility] [address]  [facility] [address]        Equal Access to Services     [facility]: Hadii aad ku hadasho Soomaali, waaxda luqadaha, qaybta kaalmada adeegyada, bhargav sage . So [facility] [phone].    ATENCIÓN: Si habla español, tiene a cid disposición servicios gratuitos de asistencia lingüística. Llame al [phone].    We comply with applicable federal civil rights laws and [place] laws. We do not discriminate on the basis of race, color, national origin, age, disability, sex, sexual orientation, or gender identity.            Thank you!     Thank you for choosing [facility] MEDICATION THERAPY MANAGEMENT  for your care. Our goal is always to provide you with excellent care. Hearing back from our patients is one way we can continue to improve our services. Please take a few minutes to complete the written survey that you may receive in the mail after your visit with us. Thank you!             Your Updated Medication List - Protect others around you: Learn how to safely use, store and throw away your medicines at www.disposemymeds.org.          This list is accurate as of 10/31/18  2:03 PM.  Always use your most recent med list.                   Brand Name Dispense Instructions for use Diagnosis    cefTRIAXone 1 GM vial    ROCEPHIN    110 mL    Inject 1 g into the vein every 24 hours for 11 days    Infection due to Klebsiella pneumoniae       fentaNYL 100 mcg/hr 72 hr patch    DURAGESIC    30 patch    Place 3 patches onto the skin every 72  hours remove old patch.    Colon adenocarcinoma (H), Neoplasm related pain, Peritoneal carcinomatosis (H)       * hydromorphone HCl 500 MG/50ML Soln     25 mL    Dilute 250mg in 250ml NS  0.9mg IV every 10 mins as needed  Dispense 1 bag (250mg = 25ml)    Neoplasm related pain       * hydromorphone HCl 500 MG/50ML Soln     25 mL    Dilute 250mg in 250ml NS  0.9mg IV every 10 mins as needed  Dispense 1 bag (250mg = 25ml)    Neoplasm related pain       * hydromorphone HCl 500 MG/50ML Soln     25 mL    Dilute 250mg in 250ml NS  0.9mg IV every 10 mins as needed  Dispense 1 bag (250mg = 25ml)    Neoplasm related pain       * hydromorphone HCl 500 MG/50ML Soln   Start taking on:  11/1/2018     25 mL    Dilute 250mg in 250ml NS  0.9mg IV every 10 mins as needed  Dispense 1 bag (250mg = 25ml)    Neoplasm related pain       * hydromorphone HCl 500 MG/50ML Soln   Start taking on:  11/8/2018     25 mL    Dilute 250mg in 250ml NS  0.9mg IV every 10 mins as needed  Dispense 1 bag (250mg = 25ml)    Neoplasm related pain       * hydromorphone HCl 500 MG/50ML Soln   Start taking on:  11/15/2018     25 mL    Dilute 250mg in 250ml NS  0.9mg IV every 10 mins as needed  Dispense 1 bag (250mg = 25ml)    Neoplasm related pain       loperamide 2 MG capsule    IMODIUM     Take 2 mg by mouth as needed for diarrhea        LORazepam 0.5 MG tablet    ATIVAN    30 tablet    Take 1 tablet (0.5 mg) by mouth every 4 hours as needed (Anxiety, Nausea/Vomiting or Sleep)    Peritoneal carcinomatosis (H), Cancer of sigmoid colon (H)       naloxone nasal spray    NARCAN    0.2 mL    Spray 1 spray (4 mg) into one nostril alternating nostrils as needed for opioid reversal every 2-3 minutes until assistance arrives    Cancer of sigmoid colon (H), Long term current use of opiate analgesic       ondansetron 8 MG ODT tab    ZOFRAN-ODT    60 tablet    Take 1 tablet (8 mg) by mouth every 8 hours as needed for nausea    Nausea       order for DME     1 Device     Equipment being ordered: home suction machine with tubing for connection to venting G-tube Treatment Diagnosis: colon adenocarcinoma    Colon adenocarcinoma (H), Small bowel obstruction (H)       parenteral nutrition - PTA/DISCHARGE ORDER      Inject into the vein daily FVHI        prochlorperazine 10 MG tablet    COMPAZINE     10 mg every 6 hours as needed        sodium chloride 0.9% Soln BOLUS     1000 mL    Inject 1,000 mLs into the vein daily as needed For hydration.        * Notice:  This list has 6 medication(s) that are the same as other medications prescribed for you. Read the directions carefully, and ask your doctor or other care provider to review them with you.

## 2018-10-31 NOTE — PROGRESS NOTES
"SUBJECTIVE/OBJECTIVE:                Sebas Lopez is a 53 year old male called in follow up from our visit on 8/15/18, and for post-hospital follow up.  He was discharged from [facility] on 10/23/18 for sepsis/bacteremia.      Chief Complaint: medication review.    The primary oncologist for this patient is Dr Long.  The PCP for this patient is Dr Jaguar Becker.    Cancer diagnosis: Colon cancer    Allergies/ADRs: None  Tobacco: No tobacco use   Alcohol: none    PMH: Reviewed in Epic    Medication Adherence/Access:  Patient uses pill box(es), and medication chart.  Per patient, misses medication 0 times per week.     Of note, patient has a venting G-tube; he is able to clamp it for at least 1-2 hours after oral medications.    He is also on TPN.    Sepsis/bacteremia:  Current medications include: Ceftriaxone IV daily for 14 days.  Patient states his girlfriend has no trouble administering this.  He denies diarrhea or rash.    Cancer related pain:  Current medications include: fentanyl patch 300mcg/hr Q72 hours (as [id number]), plus IV PCA with hydromorphone.  Patient reports more abdominal pain.  The dexamethasone was stopped about a couple of weeks ago.  Patient does not remember why.      GERD: Current medications include: none.  Patient stated that he ran out of it prior to this last hospitalization, and it was not continued after discharge.  He is now having more abdominal pain.      Latest Ht and Wt:  Wt Readings from Last 2 Encounters:   10/23/18 147 lb 11.3 oz (67 kg)   10/04/18 141 lb 12 oz (64.3 kg)     Ht Readings from Last 2 Encounters:   10/19/18 5' 9\" (1.753 m)   10/04/18 5' 9\" (1.753 m)        Latest vitals:  There were no vitals taken for this visit. (phone visit)    BP Readings from Last 3 Encounters:   10/23/18 101/62   10/04/18 107/69   10/04/18 107/69         ASSESSMENT:                 Current medications were reviewed today.      Medication Adherence: good, no issues " identified    Sepsis/bacteremia: Improved. Patient would benefit from finishing antibiotic course as he is.    Cancer related pain: Needs Improvement. Will ask Sebas's providers whether it would be appropriate to restart the dexamethasone.    GERD: Needs improvement.  Will ask Sebas's providers whether restarting pantoprazole would be appropriate.      PLAN:                  Will ask ANNAMARIE Mckenna and Dr Long whether it would be appropriate to restart dexamethasone and pantoprazole.      I spent 15 minutes with this patient today. A copy of the visit note was provided to the patient's primary care and referring provider.    The patient was sent via Tutto a summary of these recommendations as an after visit summary.    Sherry Coronel, PharmD, BCOP, BCPS  Clinical Pharmacy Specialist/  Oncology Medication Therapy Management Pharmacist  [facility]  Pager [phone]  Phone [phone]

## 2018-11-02 NOTE — PROGRESS NOTES
This is a recent snapshot of the patient's [facility] medical record.  For current drug dose and complete information and questions, call [phone] or In Basket pool, [facility] home infusion ([id number])  CSN Number:  [id number]

## 2018-11-05 NOTE — MR AVS SNAPSHOT
After Visit Summary   11/5/2018    Sebas Lopez    MRN: [id number]           Patient Information     Date Of Birth          1965        Visit Information        Provider Department      11/5/2018 11:10 AM Linda Alves PA-C [facility]        Today's Diagnoses     Colon adenocarcinoma (H)    -  1    Peritoneal carcinomatosis (H)        Cancer of sigmoid colon (H)        Neoplasm related pain        Anemia, unspecified type           Follow-ups after your visit        Your next 10 appointments already scheduled     Nov 13, 2018 10:30 AM CST   (Arrive by 10:15 AM)   Return Visit with Lydia Beckham MD   [facility] ([facility])    [address] [place] [address]  [address]  [facility] [address] [phone]            Nov 14, 2018  9:30 AM CST   (Arrive by 9:15 AM)   Return Visit with Lorenzo Trujillo MD   [facility] ([facility])    [address] [place] [address]  [address]  [facility] [address] [phone]              Who to contact     If you have questions or need follow up information about today's clinic visit or your schedule please contact [facility] directly at [phone].  Normal or non-critical lab and imaging results will be communicated to you by MyChart, letter or phone within 4 business days after the clinic has received the results. If you do not hear from us within 7 days, please contact the clinic through MyChart or phone. If you have a critical or abnormal lab result, we will notify you by phone as soon as possible.  Submit refill requests through "[employer] or call your pharmacy and they will forward the refill request to us. Please allow 3 business days for your refill to be completed.          Additional Information About Your Visit        Ztailhart Information     "[employer] gives you secure access to your electronic health record. If you  "see a primary care provider, you can also send messages to your care team and make appointments. If you have questions, please call your primary care clinic.  If you do not have a primary care provider, please call [phone] and they will assist you.        Care EveryWhere ID     This is your Care EveryWhere ID. This could be used by other organizations to access your [place] medical records  [id number]        Your Vitals Were     Pulse Temperature Respirations Height Pulse Oximetry BMI (Body Mass Index)    114 97.8  F (36.6  C) (Oral) 16 1.753 m (5' 9.02\") 100% 21.89 kg/m2       Blood Pressure from Last 3 Encounters:   11/05/18 109/67   10/23/18 101/62   10/04/18 107/69    Weight from Last 3 Encounters:   11/05/18 67.3 kg (148 lb 4.8 oz)   10/23/18 67 kg (147 lb 11.3 oz)   10/04/18 64.3 kg (141 lb 12 oz)              We Performed the Following     CBC with platelets differential     Comprehensive metabolic panel     Ferritin     Iron and iron binding capacity     Reticulocyte count          Today's Medication Changes          These changes are accurate as of 11/5/18 11:59 PM.  If you have any questions, ask your nurse or doctor.               These medicines have changed or have updated prescriptions.        Dose/Directions    fentaNYL 100 mcg/hr 72 hr patch   Commonly known as:  DURAGESIC   This may have changed:  additional instructions   Used for:  Colon adenocarcinoma (H), Neoplasm related pain, Peritoneal carcinomatosis (H)   Changed by:  Linda Alves PA-C        Dose:  3 patch   Place 3 patches onto the skin every 72 hours remove old patches.   Quantity:  30 patch   Refills:  0            Where to get your medicines      Some of these will need a paper prescription and others can be bought over the counter.  Ask your nurse if you have questions.     Bring a paper prescription for each of these medications     fentaNYL 100 mcg/hr 72 hr patch               Information about OPIOIDS     " PRESCRIPTION OPIOIDS: WHAT YOU NEED TO KNOW   We gave you an opioid (narcotic) pain medicine. It is important to manage your pain, but opioids are not always the best choice. You should first try all the other options your care team gave you. Take this medicine for as short a time (and as few doses) as possible.    Some activities can increase your pain, such as bandage changes or therapy sessions. It may help to take your pain medicine 30 to 60 minutes before these activities. Reduce your stress by getting enough sleep, working on hobbies you enjoy and practicing relaxation or meditation. Talk to your care team about ways to manage your pain beyond prescription opioids.    These medicines have risks:    DO NOT drive when on new or higher doses of pain medicine. These medicines can affect your alertness and reaction times, and you could be arrested for driving under the influence (DUI). If you need to use opioids long-term, talk to your care team about driving.    DO NOT operate heavy machinery    DO NOT do any other dangerous activities while taking these medicines.    DO NOT drink any alcohol while taking these medicines.     If the opioid prescribed includes acetaminophen, DO NOT take with any other medicines that contain acetaminophen. Read all labels carefully. Look for the word  acetaminophen  or  Tylenol.  Ask your pharmacist if you have questions or are unsure.    You can get addicted to pain medicines, especially if you have a history of addiction (chemical, alcohol or substance dependence). Talk to your care team about ways to reduce this risk.    All opioids tend to cause constipation. Drink plenty of water and eat foods that have a lot of fiber, such as fruits, vegetables, prune juice, apple juice and high-fiber cereal. Take a laxative (Miralax, milk of magnesia, Colace, Senna) if you don t move your bowels at least every other day. Other side effects include upset stomach, sleepiness, dizziness,  throwing up, tolerance (needing more of the medicine to have the same effect), physical dependence and slowed breathing.    Store your pills in a secure place, locked if possible. We will not replace any lost or stolen medicine. If you don t finish your medicine, please throw away (dispose) as directed by your pharmacist. The [place] Pollution Control Agency has more information about safe disposal: https://www.pca.[email or web address].mn.us/living-green/managing-unwanted-medications         Primary Care Provider Office Phone # Fax #    Jagura Becker [phone]       [facility] [address]  [facility] [address]        Equal Access to Services     [facility] SHAUN : Hadii karrie reddy hadasho Soshu, waaxda luqadaha, qaybta kaalmada lewis, bhargav sage . So [facility] [phone].    ATENCIÓN: Si habla español, tiene a cid disposición servicios gratuitos de asistencia lingüística. Ll[facility] [phone].    We comply with applicable federal civil rights laws and [place] laws. We do not discriminate on the basis of race, color, national origin, age, disability, sex, sexual orientation, or gender identity.            Thank you!     Thank you for choosing [facility] CLINIC  for your care. Our goal is always to provide you with excellent care. Hearing back from our patients is one way we can continue to improve our services. Please take a few minutes to complete the written survey that you may receive in the mail after your visit with us. Thank you!             Your Updated Medication List - Protect others around you: Learn how to safely use, store and throw away your medicines at www.disposemymeds.org.          This list is accurate as of 11/5/18 11:59 PM.  Always use your most recent med list.                   Brand Name Dispense Instructions for use Diagnosis    fentaNYL 100 mcg/hr 72 hr patch    DURAGESIC    30 patch    Place 3 patches onto the skin every 72 hours remove  old patches.    Colon adenocarcinoma (H), Neoplasm related pain, Peritoneal carcinomatosis (H)       * hydromorphone HCl 500 MG/50ML Soln     25 mL    Dilute 250mg in 250ml NS  0.9mg IV every 10 mins as needed  Dispense 1 bag (250mg = 25ml)    Neoplasm related pain       * hydromorphone HCl 500 MG/50ML Soln     25 mL    Dilute 250mg in 250ml NS  0.9mg IV every 10 mins as needed  Dispense 1 bag (250mg = 25ml)    Neoplasm related pain       * hydromorphone HCl 500 MG/50ML Soln     25 mL    Dilute 250mg in 250ml NS  0.9mg IV every 10 mins as needed  Dispense 1 bag (250mg = 25ml)    Neoplasm related pain       * hydromorphone HCl 500 MG/50ML Soln     25 mL    Dilute 250mg in 250ml NS  0.9mg IV every 10 mins as needed  Dispense 1 bag (250mg = 25ml)    Neoplasm related pain       * hydromorphone HCl 500 MG/50ML Soln     25 mL    Dilute 250mg in 250ml NS  0.9mg IV every 10 mins as needed  Dispense 1 bag (250mg = 25ml)    Neoplasm related pain       * hydromorphone HCl 500 MG/50ML Soln   Start taking on:  11/15/2018     25 mL    Dilute 250mg in 250ml NS  0.9mg IV every 10 mins as needed  Dispense 1 bag (250mg = 25ml)    Neoplasm related pain       loperamide 2 MG capsule    IMODIUM     Take 2 mg by mouth as needed for diarrhea        LORazepam 0.5 MG tablet    ATIVAN    30 tablet    Take 1 tablet (0.5 mg) by mouth every 4 hours as needed (Anxiety, Nausea/Vomiting or Sleep)    Peritoneal carcinomatosis (H), Cancer of sigmoid colon (H)       naloxone nasal spray    NARCAN    0.2 mL    Spray 1 spray (4 mg) into one nostril alternating nostrils as needed for opioid reversal every 2-3 minutes until assistance arrives    Cancer of sigmoid colon (H), Long term current use of opiate analgesic       ondansetron 8 MG ODT tab    ZOFRAN-ODT    60 tablet    Take 1 tablet (8 mg) by mouth every 8 hours as needed for nausea    Nausea       order for DME     1 Device    Equipment being ordered: home suction machine with tubing for  connection to venting G-tube Treatment Diagnosis: colon adenocarcinoma    Colon adenocarcinoma (H), Small bowel obstruction (H)       parenteral nutrition - PTA/DISCHARGE ORDER      Inject into the vein daily FVHI        prochlorperazine 10 MG tablet    COMPAZINE     10 mg every 6 hours as needed        sodium chloride 0.9% Soln BOLUS     1000 mL    Inject 1,000 mLs into the vein daily as needed For hydration.        * Notice:  This list has 6 medication(s) that are the same as other medications prescribed for you. Read the directions carefully, and ask your doctor or other care provider to review them with you.

## 2018-11-05 NOTE — NURSING NOTE
Chief Complaint   Patient presents with     RECHECK     ONC Colon Ca      Blood Draw     Picc line labs collected by RN.

## 2018-11-05 NOTE — LETTER
11/5/2018      RE: Sebas Lopez  [address] [place] [address]       Oncology/Hematology Visit Note  Nov 5, 2018    Reason for Visit: seen in f/u of 10/19-10/23/18 hospitalization.    Oncology HPI: Sebas Lopez is a 53 year old man with a PMH of ulcerative colitis. He presented in May 2017 with an obstructing sigmoid mass, biopsied positive for adenocarcinoma. He developed obstructive symptoms and was found to have peritoneal carcinomatosis at the time of exploratory surgery. He underwent lysis of adhesions and small bowel resection with end ileostomy. He was started was on  FOLFOX from August 2017-June 2018. He was considered for HIPEC in Dec 2017, but at the time of exploratory laparotomy, his peritoenal disease was too extensive. His course was complicated with colonic obstruction for which he was hospitalized in January 2018. He underwent colon stenting He also had complicating bacteremia in Feb 2018, an infected port was removed. He was treated with IV antibiotics via PICC. In May 2018, he was found to have improved disease and the 5FU bolus was discontinued. In July 2018, he was found to have worsening peritoneal disease and colitis. A 3rd colon stent was placed. He was readmitted with SBO and PAULINA and a venting G tube was placed.   He initiated Irinotecan and Panitumumab on 8/17/18. His course has been complicated thus far with dehydration. Scan on 10/04/18 showed progression of disease with worsening carcinomatosis and increase in size of primary tumor, Dr. Long discussed further treatment options with Sebas versus hospice and treatment team is awaiting patient decision at this time.     Interval history:   Sebas is here today with his mother. He is here for follow-up after a 5 day hospitalization for PICC associated klebsiella pneumoniae bacteremia. He was discharged on 14 day course of IV ceftriaxone which he reports completing yesterday. Overall he reports improvement in status since getting  out of the hospital, his energy is improving. He reports rare nausea that is relieved with PRN Zofran or ativan. He reports pain is well managed with Fentanyl and PCA dilaudid. He continues with 12 hour TPN infusions. He reports a decrease in saline infusions as he has increased his oral intake to 32-48 ounces of fluid daily. He also reports he is eating some mainly soft to liquid foods. He reports emptying ostomy 2-3 times daily with liquid consistency stool. He has not decided what treatment direction he wants to go with, he is leaning towards no treatment but would like to meet with Dr. Trujillo before deciding.     ROS:  He denies fever, chills, headache, chest pain, and shortness of breath. He also denies vomiting, numbness, tingling, hearing and vision changes.     Current Outpatient Prescriptions   Medication Sig Dispense Refill     fentaNYL (DURAGESIC) 100 mcg/hr 72 hr patch Place 3 patches onto the skin every 72 hours remove old patches. 30 patch 0     hydromorphone HCl 500 MG/50ML SOLN Dilute 250mg in 250ml NS    0.9mg IV every 10 mins as needed    Dispense 1 bag (250mg = 25ml) 25 mL 0     hydromorphone HCl 500 MG/50ML SOLN Dilute 250mg in 250ml NS    0.9mg IV every 10 mins as needed    Dispense 1 bag (250mg = 25ml) 25 mL 0     hydromorphone HCl 500 MG/50ML SOLN Dilute 250mg in 250ml NS    0.9mg IV every 10 mins as needed    Dispense 1 bag (250mg = 25ml) 25 mL 0     hydromorphone HCl 500 MG/50ML SOLN Dilute 250mg in 250ml NS    0.9mg IV every 10 mins as needed    Dispense 1 bag (250mg = 25ml) 25 mL 0     [START ON 11/8/2018] hydromorphone HCl 500 MG/50ML SOLN Dilute 250mg in 250ml NS    0.9mg IV every 10 mins as needed    Dispense 1 bag (250mg = 25ml) 25 mL 0     [START ON 11/15/2018] hydromorphone HCl 500 MG/50ML SOLN Dilute 250mg in 250ml NS    0.9mg IV every 10 mins as needed    Dispense 1 bag (250mg = 25ml) 25 mL 0     loperamide (IMODIUM) 2 MG capsule Take 2 mg by mouth as needed for diarrhea        "LORazepam (ATIVAN) 0.5 MG tablet Take 1 tablet (0.5 mg) by mouth every 4 hours as needed (Anxiety, Nausea/Vomiting or Sleep) 30 tablet 5     naloxone (NARCAN) nasal spray Spray 1 spray (4 mg) into one nostril alternating nostrils as needed for opioid reversal every 2-3 minutes until assistance arrives 0.2 mL 0     ondansetron (ZOFRAN-ODT) 8 MG ODT tab Take 1 tablet (8 mg) by mouth every 8 hours as needed for nausea 60 tablet 5     order for DME Equipment being ordered: home suction machine with tubing for connection to venting G-tube  Treatment Diagnosis: colon adenocarcinoma 1 Device 0     parenteral nutrition - PTA/DISCHARGE ORDER Inject into the vein daily [facility]       prochlorperazine (COMPAZINE) 10 MG tablet 10 mg every 6 hours as needed        sodium chloride 0.9% SOLN BOLUS Inject 1,000 mLs into the vein daily as needed For hydration. 1000 mL 0       Allergies   Allergen Reactions     Liquid Adhesive Rash     Dermabond, rash with pustules, occurred with abdominal surgery and port removal      Exam:   General: The patient is a pleasant male in no acute distress. He appears chronically ill.  /67 (BP Location: Right arm, Patient Position: Sitting)  Pulse 114  Temp 97.8  F (36.6  C) (Oral)  Resp 16  Ht 1.753 m (5' 9.02\")  Wt 67.3 kg (148 lb 4.8 oz)  SpO2 100%  BMI 21.89 kg/m2  Wt Readings from Last 10 Encounters:   11/05/18 67.3 kg (148 lb 4.8 oz)   10/23/18 67 kg (147 lb 11.3 oz)   10/04/18 64.3 kg (141 lb 12 oz)   10/04/18 64.3 kg (141 lb 12.8 oz)   09/21/18 65.1 kg (143 lb 9.6 oz)   09/07/18 64.1 kg (141 lb 4.8 oz)   09/05/18 65 kg (143 lb 4.8 oz)   09/02/18 65.4 kg (144 lb 3.2 oz)   08/30/18 63.2 kg (139 lb 4.8 oz)   08/24/18 68.2 kg (150 lb 6.4 oz)   HEENT: EOMI, PERRL. Sclerae are anicteric. Oral mucosa is pink and moist with no lesions or thrush.   Lymph: Neck is supple with no lymphadenopathy in the cervical or supraclavicular areas.   Heart: Mild tachycardic rate and regular rhythm.   Lungs: " Clear to auscultation bilaterally.   Abdomen: Bowel sounds present, mildly distended, ostomy bag in place with liquid brown stool, no drainage or erythema, abdomen is firm and non-tender throughout.   Extremities: No lower extremity edema noted bilaterally.   Neuro: Cranial nerves II through XII are grossly intact.  Skin: Dry and cracked skin noted on fingers. No erythema.     Labs:    11/5/2018 11:25   Sodium 135   Potassium 4.9   Chloride 101   Carbon Dioxide 27   Urea Nitrogen 21   Creatinine 0.62 (L)   GFR Estimate >90   GFR Estimate If Black >90   Calcium 9.0   Anion Gap 7   Albumin 2.0 (L)   Protein Total 8.6   Bilirubin Total 0.3   Alkaline Phosphatase 146   ALT 17   AST 30   Glucose 107 (H)   WBC 8.5   Hemoglobin 8.1 (L)   Hematocrit 26.3 (L)   Platelet Count 414   RBC Count 3.20 (L)   MCV 82   MCH 25.3 (L)   MCHC 30.8 (L)   RDW 18.2 (H)   Diff Method Automated Method   % Neutrophils 64.4   % Lymphocytes 20.8   % Monocytes 11.7   % Eosinophils 2.2   % Basophils 0.5   % Immature Granulocytes 0.4   Nucleated RBCs 0   Absolute Neutrophil 5.5   Absolute Lymphocytes 1.8   Absolute Monocytes 1.0   Absolute Eosinophils 0.2   Absolute Basophils 0.0   Abs Immature Granulocytes 0.0   Absolute Nucleated RBC 0.0       Impression/plan:   1. Stage IV colon cancer with peritoneal carcinomatosis and an obstructing sigmoid colon mass  Disease progression was discussed at most recent visit with Dr. Long currently awaiting patient decision for direction of treatment.  He is waiting to decide until after his upcoming appointment with Dr. Trujillo. Will follow-up in clinic in 4 weeks or sooner if needed.     2. Chronic bowel obstruction s/p placement of venting G tube, on TPN  -sx are managed well with venting tube, currently open 1 hour/day.  -pain is well controlled with fentanyl patches and hydromorphone pca. Follows with palliative care.     3. FEN: Stable, has increase oral intake of fluids and continues with TPN 12 hours  daily  -Cr is normal today. Appears to be getting in adequate hydration.   -Albumin 2.0 today which is up from 1.4 on 10/20/18.  -Sodium stable today.     4. Heme:  -Anemia: hgb 8.1 today. Last Irons check in 8/2018, historically he has had anemia of chronic disease. Iron studies added to labs and will consider IV infusion of Iron and transfusion if hgb continues to drop.     5.Pain  -well managed with Fentanyl and Dilaudid. May consider adding dexamethasone back to treatment plan if pain control worsens.    6. Vaccinations: He declines to have the influenza vaccine today.    Arcadio Collazo Nurse Practitioner Student  [facility] [place] [address]  [phone]    The patient was seen in conjunction with Arcadio Collazo, NP student who served as a scribe for today's visit. I have examined the patient, and have reviewed and edited the note and agree with the above findings and plan.  SUPA Mckenna PA-C

## 2018-11-05 NOTE — PROGRESS NOTES
Oncology/Hematology Visit Note  Nov 5, 2018    Reason for Visit: seen in f/u of 10/19-10/23/18 hospitalization.    Oncology HPI: Sebas Lopez is a 53 year old man with a PMH of ulcerative colitis. He presented in May 2017 with an obstructing sigmoid mass, biopsied positive for adenocarcinoma. He developed obstructive symptoms and was found to have peritoneal carcinomatosis at the time of exploratory surgery. He underwent lysis of adhesions and small bowel resection with end ileostomy. He was started was on  FOLFOX from August 2017-June 2018. He was considered for HIPEC in Dec 2017, but at the time of exploratory laparotomy, his peritoenal disease was too extensive. His course was complicated with colonic obstruction for which he was hospitalized in January 2018. He underwent colon stenting He also had complicating bacteremia in Feb 2018, an infected port was removed. He was treated with IV antibiotics via PICC. In May 2018, he was found to have improved disease and the 5FU bolus was discontinued. In July 2018, he was found to have worsening peritoneal disease and colitis. A 3rd colon stent was placed. He was readmitted with SBO and PAULINA and a venting G tube was placed.   He initiated Irinotecan and Panitumumab on 8/17/18. His course has been complicated thus far with dehydration. Scan on 10/04/18 showed progression of disease with worsening carcinomatosis and increase in size of primary tumor, Dr. Long discussed further treatment options with Sebas versus hospice and treatment team is awaiting patient decision at this time.     Interval history:   Sebas is here today with his mother. He is here for follow-up after a 5 day hospitalization for PICC associated klebsiella pneumoniae bacteremia. He was discharged on 14 day course of IV ceftriaxone which he reports completing yesterday. Overall he reports improvement in status since getting out of the hospital, his energy is improving. He reports rare nausea that is  relieved with PRN Zofran or ativan. He reports pain is well managed with Fentanyl and PCA dilaudid. He continues with 12 hour TPN infusions. He reports a decrease in saline infusions as he has increased his oral intake to 32-48 ounces of fluid daily. He also reports he is eating some mainly soft to liquid foods. He reports emptying ostomy 2-3 times daily with liquid consistency stool. He has not decided what treatment direction he wants to go with, he is leaning towards no treatment but would like to meet with Dr. Trujillo before deciding.     ROS:  He denies fever, chills, headache, chest pain, and shortness of breath. He also denies vomiting, numbness, tingling, hearing and vision changes.     Current Outpatient Prescriptions   Medication Sig Dispense Refill     fentaNYL (DURAGESIC) 100 mcg/hr 72 hr patch Place 3 patches onto the skin every 72 hours remove old patches. 30 patch 0     hydromorphone HCl 500 MG/50ML SOLN Dilute 250mg in 250ml NS    0.9mg IV every 10 mins as needed    Dispense 1 bag (250mg = 25ml) 25 mL 0     hydromorphone HCl 500 MG/50ML SOLN Dilute 250mg in 250ml NS    0.9mg IV every 10 mins as needed    Dispense 1 bag (250mg = 25ml) 25 mL 0     hydromorphone HCl 500 MG/50ML SOLN Dilute 250mg in 250ml NS    0.9mg IV every 10 mins as needed    Dispense 1 bag (250mg = 25ml) 25 mL 0     hydromorphone HCl 500 MG/50ML SOLN Dilute 250mg in 250ml NS    0.9mg IV every 10 mins as needed    Dispense 1 bag (250mg = 25ml) 25 mL 0     [START ON 11/8/2018] hydromorphone HCl 500 MG/50ML SOLN Dilute 250mg in 250ml NS    0.9mg IV every 10 mins as needed    Dispense 1 bag (250mg = 25ml) 25 mL 0     [START ON 11/15/2018] hydromorphone HCl 500 MG/50ML SOLN Dilute 250mg in 250ml NS    0.9mg IV every 10 mins as needed    Dispense 1 bag (250mg = 25ml) 25 mL 0     loperamide (IMODIUM) 2 MG capsule Take 2 mg by mouth as needed for diarrhea       LORazepam (ATIVAN) 0.5 MG tablet Take 1 tablet (0.5 mg) by mouth every 4 hours  "as needed (Anxiety, Nausea/Vomiting or Sleep) 30 tablet 5     naloxone (NARCAN) nasal spray Spray 1 spray (4 mg) into one nostril alternating nostrils as needed for opioid reversal every 2-3 minutes until assistance arrives 0.2 mL 0     ondansetron (ZOFRAN-ODT) 8 MG ODT tab Take 1 tablet (8 mg) by mouth every 8 hours as needed for nausea 60 tablet 5     order for DME Equipment being ordered: home suction machine with tubing for connection to venting G-tube  Treatment Diagnosis: colon adenocarcinoma 1 Device 0     parenteral nutrition - PTA/DISCHARGE ORDER Inject into the vein daily [facility]       prochlorperazine (COMPAZINE) 10 MG tablet 10 mg every 6 hours as needed        sodium chloride 0.9% SOLN BOLUS Inject 1,000 mLs into the vein daily as needed For hydration. 1000 mL 0       Allergies   Allergen Reactions     Liquid Adhesive Rash     Dermabond, rash with pustules, occurred with abdominal surgery and port removal      Exam:   General: The patient is a pleasant male in no acute distress. He appears chronically ill.  /67 (BP Location: Right arm, Patient Position: Sitting)  Pulse 114  Temp 97.8  F (36.6  C) (Oral)  Resp 16  Ht 1.753 m (5' 9.02\")  Wt 67.3 kg (148 lb 4.8 oz)  SpO2 100%  BMI 21.89 kg/m2  Wt Readings from Last 10 Encounters:   11/05/18 67.3 kg (148 lb 4.8 oz)   10/23/18 67 kg (147 lb 11.3 oz)   10/04/18 64.3 kg (141 lb 12 oz)   10/04/18 64.3 kg (141 lb 12.8 oz)   09/21/18 65.1 kg (143 lb 9.6 oz)   09/07/18 64.1 kg (141 lb 4.8 oz)   09/05/18 65 kg (143 lb 4.8 oz)   09/02/18 65.4 kg (144 lb 3.2 oz)   08/30/18 63.2 kg (139 lb 4.8 oz)   08/24/18 68.2 kg (150 lb 6.4 oz)   HEENT: EOMI, PERRL. Sclerae are anicteric. Oral mucosa is pink and moist with no lesions or thrush.   Lymph: Neck is supple with no lymphadenopathy in the cervical or supraclavicular areas.   Heart: Mild tachycardic rate and regular rhythm.   Lungs: Clear to auscultation bilaterally.   Abdomen: Bowel sounds present, mildly " distended, ostomy bag in place with liquid brown stool, no drainage or erythema, abdomen is firm and non-tender throughout.   Extremities: No lower extremity edema noted bilaterally.   Neuro: Cranial nerves II through XII are grossly intact.  Skin: Dry and cracked skin noted on fingers. No erythema.     Labs:    11/5/2018 11:25   Sodium 135   Potassium 4.9   Chloride 101   Carbon Dioxide 27   Urea Nitrogen 21   Creatinine 0.62 (L)   GFR Estimate >90   GFR Estimate If Black >90   Calcium 9.0   Anion Gap 7   Albumin 2.0 (L)   Protein Total 8.6   Bilirubin Total 0.3   Alkaline Phosphatase 146   ALT 17   AST 30   Glucose 107 (H)   WBC 8.5   Hemoglobin 8.1 (L)   Hematocrit 26.3 (L)   Platelet Count 414   RBC Count 3.20 (L)   MCV 82   MCH 25.3 (L)   MCHC 30.8 (L)   RDW 18.2 (H)   Diff Method Automated Method   % Neutrophils 64.4   % Lymphocytes 20.8   % Monocytes 11.7   % Eosinophils 2.2   % Basophils 0.5   % Immature Granulocytes 0.4   Nucleated RBCs 0   Absolute Neutrophil 5.5   Absolute Lymphocytes 1.8   Absolute Monocytes 1.0   Absolute Eosinophils 0.2   Absolute Basophils 0.0   Abs Immature Granulocytes 0.0   Absolute Nucleated RBC 0.0       Impression/plan:   1. Stage IV colon cancer with peritoneal carcinomatosis and an obstructing sigmoid colon mass  Disease progression was discussed at most recent visit with Dr. Long currently awaiting patient decision for direction of treatment.  He is waiting to decide until after his upcoming appointment with Dr. Trujillo. Will follow-up in clinic in 4 weeks or sooner if needed.     2. Chronic bowel obstruction s/p placement of venting G tube, on TPN  -sx are managed well with venting tube, currently open 1 hour/day.  -pain is well controlled with fentanyl patches and hydromorphone pca. Follows with palliative care.     3. FEN: Stable, has increase oral intake of fluids and continues with TPN 12 hours daily  -Cr is normal today. Appears to be getting in adequate hydration.    -Albumin 2.0 today which is up from 1.4 on 10/20/18.  -Sodium stable today.     4. Heme:  -Anemia: hgb 8.1 today. Last Irons check in 8/2018, historically he has had anemia of chronic disease. Iron studies added to labs and will consider IV infusion of Iron and transfusion if hgb continues to drop.     5.Pain  -well managed with Fentanyl and Dilaudid. May consider adding dexamethasone back to treatment plan if pain control worsens.    6. Vaccinations: He declines to have the influenza vaccine today.    Arcadio Collazo Nurse Practitioner Student  [facility] [place] [address]  [phone]    The patient was seen in conjunction with Arcadio Collazo, MICHAEL student who served as a scribe for today's visit. I have examined the patient, and have reviewed and edited the note and agree with the above findings and plan.  Linda Alves PA-C

## 2018-11-05 NOTE — NURSING NOTE
"Oncology Rooming Note    November 5, 2018 11:36 AM   Sebas Lopez is a 53 year old male who presents for:    Chief Complaint   Patient presents with     RECHECK     ONC Colon Ca      Initial Vitals: /67 (BP Location: Right arm, Patient Position: Sitting)  Pulse 114  Temp 97.8  F (36.6  C) (Oral)  Resp 16  Ht 1.753 m (5' 9.02\")  Wt 67.3 kg (148 lb 4.8 oz)  SpO2 100%  BMI 21.89 kg/m2 Estimated body mass index is 21.89 kg/(m^2) as calculated from the following:    Height as of this encounter: 1.753 m (5' 9.02\").    Weight as of this encounter: 67.3 kg (148 lb 4.8 oz). Body surface area is 1.81 meters squared.  Moderate Pain (4) Comment: Data Unavailable   No LMP for male patient.  Allergies reviewed: Yes  Medications reviewed: Yes    Medications: Medication refills not needed today.  Pharmacy name entered into [employer]:    [facility] [place] - [address] [facility]    Clinical concerns: none      6 minutes for nursing intake (face to face time)     Judith TEOFILO Damon              "

## 2018-11-05 NOTE — PROGRESS NOTES
This is a recent snapshot of the patient's [facility] medical record.  For current drug dose and complete information and questions, call [phone] or In Basket pool, [facility] home infusion ([id number])  CSN Number:  [id number]

## 2018-11-05 NOTE — Clinical Note
11/5/2018       RE: Sebas Lopez  [address] [place] [address]     Dear Colleague,    Thank you for referring your patient, Sebas Lopez, to the [facility]. Please see a copy of my visit note below.    Oncology/Hematology Visit Note  Nov 5, 2018    Reason for Visit: seen in f/u of 10/19-10/23/18 hospitalization.    Oncology HPI: Sebas Lopez is a 53 year old man with a PMH of ulcerative colitis. He presented in May 2017 with an obstructing sigmoid mass, biopsied positive for adenocarcinoma. He developed obstructive symptoms and was found to have peritoneal carcinomatosis at the time of exploratory surgery. He underwent lysis of adhesions and small bowel resection with end ileostomy. He was started was on  FOLFOX from August 2017-June 2018. He was considered for HIPEC in Dec 2017, but at the time of exploratory laparotomy, his peritoenal disease was too extensive. His course was complicated with colonic obstruction for which he was hospitalized in January 2018. He underwent colon stenting He also had complicating bacteremia in Feb 2018, an infected port was removed. He was treated with IV antibiotics via PICC. In May 2018, he was found to have improved disease and the 5FU bolus was discontinued. In July 2018, he was found to have worsening peritoneal disease and colitis. A 3rd colon stent was placed. He was readmitted with SBO and PAULINA and a venting G tube was placed.   He initiated Irinotecan and Panitumumab on 8/17/18. His course has been complicated thus far with dehydration. Scan on 10/*04/18 showed progression of disease with worsening carcinomatosis and increase in size of primary tumor, Dr. Long discussed further treatment options with Sebas and treatment team is awaiting patient decision at this time.     Interval history:   Sebas is here today with his mother. He is here for follow-up after a 5 day hospitalization for PICC associated klebsiella pneumoniae bacteremia.  He was discharged on 14 day course of IV ceftriaxone which he reports completing yesterday. Overall he reports improvement in status since getting out of the hospital, his energy is improving. He reports rare nausea that is relieved with PRN Zofran or ativan. He reports pain is well managed with Fentanyl and PCA dilaudid. He continues with 12 hour TPN infusions. He reports a decrease in saline infusions as he has increased his oral intake to 32-48 ounces of fluid daily. He also reports he is eating some mainly soft to liquid foods. He reports emptying ostomy 2-3 times daily with liquid consistency stool. He has not decided what treatment direction he wants to go with, he is leaning towards no treatment but would like to meet with Dr. Trujillo before deciding.     ROS:  He denies fever, chills, headache, chest pain, and shortness of breath. He also denies vomiting, numbness, tingling, hearing and vision changes.     Current Outpatient Prescriptions   Medication Sig Dispense Refill     fentaNYL (DURAGESIC) 100 mcg/hr 72 hr patch Place 3 patches onto the skin every 72 hours remove old patches. 30 patch 0     hydromorphone HCl 500 MG/50ML SOLN Dilute 250mg in 250ml NS    0.9mg IV every 10 mins as needed    Dispense 1 bag (250mg = 25ml) 25 mL 0     hydromorphone HCl 500 MG/50ML SOLN Dilute 250mg in 250ml NS    0.9mg IV every 10 mins as needed    Dispense 1 bag (250mg = 25ml) 25 mL 0     hydromorphone HCl 500 MG/50ML SOLN Dilute 250mg in 250ml NS    0.9mg IV every 10 mins as needed    Dispense 1 bag (250mg = 25ml) 25 mL 0     hydromorphone HCl 500 MG/50ML SOLN Dilute 250mg in 250ml NS    0.9mg IV every 10 mins as needed    Dispense 1 bag (250mg = 25ml) 25 mL 0     [START ON 11/8/2018] hydromorphone HCl 500 MG/50ML SOLN Dilute 250mg in 250ml NS    0.9mg IV every 10 mins as needed    Dispense 1 bag (250mg = 25ml) 25 mL 0     [START ON 11/15/2018] hydromorphone HCl 500 MG/50ML SOLN Dilute 250mg in 250ml NS    0.9mg IV every  "10 mins as needed    Dispense 1 bag (250mg = 25ml) 25 mL 0     loperamide (IMODIUM) 2 MG capsule Take 2 mg by mouth as needed for diarrhea       LORazepam (ATIVAN) 0.5 MG tablet Take 1 tablet (0.5 mg) by mouth every 4 hours as needed (Anxiety, Nausea/Vomiting or Sleep) 30 tablet 5     naloxone (NARCAN) nasal spray Spray 1 spray (4 mg) into one nostril alternating nostrils as needed for opioid reversal every 2-3 minutes until assistance arrives 0.2 mL 0     ondansetron (ZOFRAN-ODT) 8 MG ODT tab Take 1 tablet (8 mg) by mouth every 8 hours as needed for nausea 60 tablet 5     order for DME Equipment being ordered: home suction machine with tubing for connection to venting G-tube  Treatment Diagnosis: colon adenocarcinoma 1 Device 0     parenteral nutrition - PTA/DISCHARGE ORDER Inject into the vein daily [facility]       prochlorperazine (COMPAZINE) 10 MG tablet 10 mg every 6 hours as needed        sodium chloride 0.9% SOLN BOLUS Inject 1,000 mLs into the vein daily as needed For hydration. 1000 mL 0       Allergies   Allergen Reactions     Liquid Adhesive Rash     Dermabond, rash with pustules, occurred with abdominal surgery and port removal      Exam:   General: The patient is a pleasant male in no acute distress. He appears chronically ill.  /67 (BP Location: Right arm, Patient Position: Sitting)  Pulse 114  Temp 97.8  F (36.6  C) (Oral)  Resp 16  Ht 1.753 m (5' 9.02\")  Wt 67.3 kg (148 lb 4.8 oz)  SpO2 100%  BMI 21.89 kg/m2  Wt Readings from Last 10 Encounters:   11/05/18 67.3 kg (148 lb 4.8 oz)   10/23/18 67 kg (147 lb 11.3 oz)   10/04/18 64.3 kg (141 lb 12 oz)   10/04/18 64.3 kg (141 lb 12.8 oz)   09/21/18 65.1 kg (143 lb 9.6 oz)   09/07/18 64.1 kg (141 lb 4.8 oz)   09/05/18 65 kg (143 lb 4.8 oz)   09/02/18 65.4 kg (144 lb 3.2 oz)   08/30/18 63.2 kg (139 lb 4.8 oz)   08/24/18 68.2 kg (150 lb 6.4 oz)   HEENT: EOMI, PERRL. Sclerae are anicteric. Oral mucosa is pink and moist with no lesions or thrush. "   Lymph: Neck is supple with no lymphadenopathy in the cervical or supraclavicular areas.   Heart: Mild tachycardic rate and regular rhythm.   Lungs: Clear to auscultation bilaterally.   Abdomen: Bowel sounds present, mildly distended, ostomy bag in place with liquid brown stool, no drainage or erythema, abdomen is firm and non-tender throughout.   Extremities: No lower extremity edema noted bilaterally.   Neuro: Cranial nerves II through XII are grossly intact.  Skin: Dry and cracked skin noted on fingers. No erythema.     Labs:    11/5/2018 11:25   Sodium 135   Potassium 4.9   Chloride 101   Carbon Dioxide 27   Urea Nitrogen 21   Creatinine 0.62 (L)   GFR Estimate >90   GFR Estimate If Black >90   Calcium 9.0   Anion Gap 7   Albumin 2.0 (L)   Protein Total 8.6   Bilirubin Total 0.3   Alkaline Phosphatase 146   ALT 17   AST 30   Glucose 107 (H)   WBC 8.5   Hemoglobin 8.1 (L)   Hematocrit 26.3 (L)   Platelet Count 414   RBC Count 3.20 (L)   MCV 82   MCH 25.3 (L)   MCHC 30.8 (L)   RDW 18.2 (H)   Diff Method Automated Method   % Neutrophils 64.4   % Lymphocytes 20.8   % Monocytes 11.7   % Eosinophils 2.2   % Basophils 0.5   % Immature Granulocytes 0.4   Nucleated RBCs 0   Absolute Neutrophil 5.5   Absolute Lymphocytes 1.8   Absolute Monocytes 1.0   Absolute Eosinophils 0.2   Absolute Basophils 0.0   Abs Immature Granulocytes 0.0   Absolute Nucleated RBC 0.0       Impression/plan:   1. Stage IV colon cancer with peritoneal carcinomatosis and an obstructing sigmoid colon mass  Disease progression was discussed at most recent visit with Dr. Long currently awaiting patient decision for direction of treatment.  He is waiting to decide until after his upcoming appointment with Dr. Trujillo. Will follow-up in clinic in 4 weeks or soon if needed.     2. Chronic bowel obstruction s/p placement of venting G tube, on TPN  -sx are managed well with venting tube, currently open 1 hour/day.  -pain is well controlled with fentanyl  patches and hydromorphone pca. Follows with palliative care.     3. FEN: Stable, has increase oral intake of fluids and continues with TPN 12 hours daily  -Cr is normal today. Appears to be getting in adequate hydration.   -Albumin 2.0 today which is up from 1.4 on 10/20/18.  -Sodium stable today.     4. Heme:  -Anemia: hgb 8.1 today. Last Irons check in 8/2018, historically he has had anemia of chronic disease. Iron studies added to labs and will consider IV infusion of Iron and transfusion if hgb continues to drop.     5.Pain  -well managed with Fentanyl and Dilaudid. May consider adding dexamethasone back to treatment plan if pain control worsens.    6. Vaccinations: He declines to have the influenza vaccine today.    Arcadio Collazo Nurse Practitioner Student  [facility] [id number] [place] [address] [phone]      Again, thank you for allowing me to participate in the care of your patient.      Sincerely,    Linda Alves PA-C

## 2018-11-06 NOTE — PROGRESS NOTES
This is a recent snapshot of the patient's [facility] medical record.  For current drug dose and complete information and questions, call [phone] or In Basket pool, fv home infusion ([id number])  CSN Number:  [id number]

## 2018-11-07 NOTE — PROGRESS NOTES
This is a recent snapshot of the patient's [facility] medical record.  For current drug dose and complete information and questions, call [phone] or In Basket pool, fv home infusion ([id number])  CSN Number:  [id number]

## 2018-11-08 NOTE — PROGRESS NOTES
This is a recent snapshot of the patient's [facility] medical record.  For current drug dose and complete information and questions, call [phone] or In Basket pool, fv home infusion ([id number])  CSN Number:  [id number]

## 2018-11-12 NOTE — TELEPHONE ENCOUNTER
Spoke with patient - he tells me that his pain has been bothering him for the past few weeks since getting home from the hospital. His pain is in his abdomen, where it has been the whole time. Is a constant pain that was previously controlled with PCA doses and fentanyl, but now doesn't seem as well controlled. He tells me that he has a hard time falling asleep due to pain. PCA doesn't take pain away like it used to. He reports ostomy output is about the same, not eating along, but is drinking a lot. He tells me that his weight has remained stable. Denies other symptoms, including fevers which he tells me that he has been monitoring for. Urinating fine. Tells me that he has been hitting his PCA button as often as he is can. Is hard to walk around right now due to pain, but when he does walk around a bit he does get some pain relief. Home infusion RN to come out to see him tomorrow.     He asks if he can be seen tomorrow instead of Wednesday as his pain is really bothersome. Scheduled him an apt with Dr. Beckham tomorrow - he tells me that he will plan to be at that apt. We discussed the ED as an option for completely uncontrolled pain, he tells me that he thinks he is okay as long as he can be seen tomorrow.    Advised I would pass this update to Dr. Beckham and that I would keep apt scheduled for Wednesday for now until he attends apt tomorrow just in case he has ride issues or anything come up. He tells me he is appreciative of this and will see us tomorrow.

## 2018-11-12 NOTE — TELEPHONE ENCOUNTER
----- Message from Gracie Saleh RN sent at 11/12/2018 12:49 PM CST -----  Regarding: Earlier Palliative Care Apt  Contact: [phone]  Pt called in to triage requesting to see Palliative earlier than scheduled Wed 11/14 apt. State his pain has been increasing, lower abd pain 5/10 on Fentanyl and PCA Dilaudid. Stated pain used to be controlled at 2/10 but has been consistently 4-5 out of 10 for the last week. Feels bloated and has had decreased output, feels oral intake has been good. Please call back at above number.      Thank You,    Estelita Saleh  [facility] Triage RN  [phone]

## 2018-11-13 NOTE — MR AVS SNAPSHOT
After Visit Summary   11/13/2018    Sebas Lopez    MRN: [id number]           Patient Information     Date Of Birth          1965        Visit Information        Provider Department      11/13/2018 10:30 AM Lydia Beckham MD [facility]        Today's Diagnoses     Attention to G-tube (H)    -  1    Colon adenocarcinoma (H)        Neoplasm related pain        Peritoneal carcinomatosis (H)          Care Instructions    Pain control:   - increase fentanyl 400mg (4 patches to skin) every 72 hours  - hydromorphone PCA increased boluses to 1.2mg every 10 minutes as needed to control pain - home care will come to your home this afternoon  - Trial of intermittent suction of venting gtube - tried to place order - script given  - when not using suction, continue continuous venting of gtube - to gravity  - try to increase walking to see if that helps decrease pain      Follow up with me on 11/27 at 2pm          Follow-ups after your visit        Your next 10 appointments already scheduled     Nov 27, 2018  2:00 PM CST   (Arrive by 1:45 PM)   Return Visit with Lydia Beckham MD   [facility] ([facility])    [address] [place] [address]  [address] [place] [address] [phone]              Who to contact     If you have questions or need follow up information about today's clinic visit or your schedule please contact [facility] directly at [phone].  Normal or non-critical lab and imaging results will be communicated to you by MyChart, letter or phone within 4 business days after the clinic has received the results. If you do not hear from us within 7 days, please contact the clinic through MyChart or phone. If you have a critical or abnormal lab result, we will notify you by phone as soon as possible.  Submit refill requests through [facility] or call your pharmacy and they will forward the refill  "request to us. Please allow 3 business days for your refill to be completed.          Additional Information About Your Visit        [employer] Information     [employer] gives you secure access to your electronic health record. If you see a primary care provider, you can also send messages to your care team and make appointments. If you have questions, please call your primary care clinic.  If you do not have a primary care provider, please call [phone] and they will assist you.        Care EveryWhere ID     This is your Care EveryWhere ID. This could be used by other organizations to access your [place] medical records  [id number]        Your Vitals Were     Pulse Temperature Respirations Height Pulse Oximetry BMI (Body Mass Index)    127 98.1  F (36.7  C) (Oral) 18 1.752 m (5' 8.98\") 98% 22.02 kg/m2       Blood Pressure from Last 3 Encounters:   11/13/18 117/74   11/05/18 109/67   10/23/18 101/62    Weight from Last 3 Encounters:   11/13/18 67.6 kg (149 lb)   11/05/18 67.3 kg (148 lb 4.8 oz)   10/23/18 67 kg (147 lb 11.3 oz)              Today, you had the following     No orders found for display         Today's Medication Changes          These changes are accurate as of 11/13/18 11:46 AM.  If you have any questions, ask your nurse or doctor.               Start taking these medicines.        Dose/Directions    order for DME   Used for:  Colon adenocarcinoma (H), Peritoneal carcinomatosis (H), Attention to G-tube (H)   Started by:  Lydia Beckham MD        Equipment being ordered: intermittent suction equipment for venting gtube   Quantity:  1 Units   Refills:  0         These medicines have changed or have updated prescriptions.        Dose/Directions    fentaNYL 100 mcg/hr 72 hr patch   Commonly known as:  DURAGESIC   This may have changed:  how much to take   Used for:  Colon adenocarcinoma (H), Neoplasm related pain, Peritoneal carcinomatosis (H)   Changed by:  Lydia Beckham MD        Dose: "  4 patch   Place 4 patches onto the skin every 72 hours remove old patches.   Quantity:  40 patch   Refills:  0       * hydromorphone HCl 500 MG/50ML Soln   This may have changed:    - additional instructions  - Another medication with the same name was removed. Continue taking this medication, and follow the directions you see here.   Used for:  Neoplasm related pain   Changed by:  Lydia Beckham MD        1.2mg IV every 10 mins as needed  Dispense 1 bag   Quantity:  100 mL   Refills:  0       * hydromorphone HCl 500 MG/50ML Soln   This may have changed:    - Another medication with the same name was changed. Make sure you understand how and when to take each.  - Another medication with the same name was removed. Continue taking this medication, and follow the directions you see here.   Used for:  Neoplasm related pain   Changed by:  Lydia Beckham MD        Start taking on:  11/15/2018   Dilute 250mg in 250ml NS  0.9mg IV every 10 mins as needed  Dispense 1 bag (250mg = 25ml)   Quantity:  25 mL   Refills:  0       * Notice:  This list has 2 medication(s) that are the same as other medications prescribed for you. Read the directions carefully, and ask your doctor or other care provider to review them with you.         Where to get your medicines      Some of these will need a paper prescription and others can be bought over the counter.  Ask your nurse if you have questions.     Bring a paper prescription for each of these medications     fentaNYL 100 mcg/hr 72 hr patch    order for DME               Information about OPIOIDS     PRESCRIPTION OPIOIDS: WHAT YOU NEED TO KNOW   We gave you an opioid (narcotic) pain medicine. It is important to manage your pain, but opioids are not always the best choice. You should first try all the other options your care team gave you. Take this medicine for as short a time (and as few doses) as possible.    Some activities can increase your pain, such as bandage  changes or therapy sessions. It may help to take your pain medicine 30 to 60 minutes before these activities. Reduce your stress by getting enough sleep, working on hobbies you enjoy and practicing relaxation or meditation. Talk to your care team about ways to manage your pain beyond prescription opioids.    These medicines have risks:    DO NOT drive when on new or higher doses of pain medicine. These medicines can affect your alertness and reaction times, and you could be arrested for driving under the influence (DUI). If you need to use opioids long-term, talk to your care team about driving.    DO NOT operate heavy machinery    DO NOT do any other dangerous activities while taking these medicines.    DO NOT drink any alcohol while taking these medicines.     If the opioid prescribed includes acetaminophen, DO NOT take with any other medicines that contain acetaminophen. Read all labels carefully. Look for the word  acetaminophen  or  Tylenol.  Ask your pharmacist if you have questions or are unsure.    You can get addicted to pain medicines, especially if you have a history of addiction (chemical, alcohol or substance dependence). Talk to your care team about ways to reduce this risk.    All opioids tend to cause constipation. Drink plenty of water and eat foods that have a lot of fiber, such as fruits, vegetables, prune juice, apple juice and high-fiber cereal. Take a laxative (Miralax, milk of magnesia, Colace, Senna) if you don t move your bowels at least every other day. Other side effects include upset stomach, sleepiness, dizziness, throwing up, tolerance (needing more of the medicine to have the same effect), physical dependence and slowed breathing.    Store your pills in a secure place, locked if possible. We will not replace any lost or stolen medicine. If you don t finish your medicine, please throw away (dispose) as directed by your pharmacist. The [place] Pollution Control Agency has more  information about safe disposal: https://www.pca.[facility].us/living-green/managing-unwanted-medications         Primary Care Provider Office Phone # Fax #    Jaguar Becker [phone]       [facility] [address]  [facility] [address]        Equal Access to Services     MELVIN SOTOMAYORMARINO : Hadii aad ku hadasho Soomaali, waaxda luqadaha, qaybta kaalmada adeegyada, waxay idiin hayaan adeeg howard laginan . So [facility] [phone].    ATENCIÓN: Si habla español, tiene a cid disposición servicios gratuitos de asistencia lingüística. Llame al [phone].    We comply with applicable federal civil rights laws and [place] laws. We do not discriminate on the basis of race, color, national origin, age, disability, sex, sexual orientation, or gender identity.            Thank you!     Thank you for choosing [facility]  for your care. Our goal is always to provide you with excellent care. Hearing back from our patients is one way we can continue to improve our services. Please take a few minutes to complete the written survey that you may receive in the mail after your visit with us. Thank you!             Your Updated Medication List - Protect others around you: Learn how to safely use, store and throw away your medicines at www.disposemymeds.org.          This list is accurate as of 11/13/18 11:46 AM.  Always use your most recent med list.                   Brand Name Dispense Instructions for use Diagnosis    fentaNYL 100 mcg/hr 72 hr patch    DURAGESIC    40 patch    Place 4 patches onto the skin every 72 hours remove old patches.    Colon adenocarcinoma (H), Neoplasm related pain, Peritoneal carcinomatosis (H)       * hydromorphone HCl 500 MG/50ML Soln     100 mL    1.2mg IV every 10 mins as needed  Dispense 1 bag    Neoplasm related pain       * hydromorphone HCl 500 MG/50ML Soln   Start taking on:  11/15/2018     25 mL    Dilute 250mg in 250ml NS  0.9mg IV every 10 mins as  needed  Dispense 1 bag (250mg = 25ml)    Neoplasm related pain       loperamide 2 MG capsule    IMODIUM     Take 2 mg by mouth as needed for diarrhea        LORazepam 0.5 MG tablet    ATIVAN    30 tablet    Take 1 tablet (0.5 mg) by mouth every 4 hours as needed (Anxiety, Nausea/Vomiting or Sleep)    Peritoneal carcinomatosis (H), Cancer of sigmoid colon (H)       naloxone nasal spray    NARCAN    0.2 mL    Spray 1 spray (4 mg) into one nostril alternating nostrils as needed for opioid reversal every 2-3 minutes until assistance arrives    Cancer of sigmoid colon (H), Long term current use of opiate analgesic       ondansetron 8 MG ODT tab    ZOFRAN-ODT    60 tablet    Take 1 tablet (8 mg) by mouth every 8 hours as needed for nausea    Nausea       order for DME     1 Device    Equipment being ordered: home suction machine with tubing for connection to venting G-tube Treatment Diagnosis: colon adenocarcinoma    Colon adenocarcinoma (H), Small bowel obstruction (H)       order for DME     1 Units    Equipment being ordered: intermittent suction equipment for venting gtube    Colon adenocarcinoma (H), Peritoneal carcinomatosis (H), Attention to G-tube (H)       parenteral nutrition - PTA/DISCHARGE ORDER      Inject into the vein daily [facility]        prochlorperazine 10 MG tablet    COMPAZINE     10 mg every 6 hours as needed        sodium chloride 0.9% Soln BOLUS     1000 mL    Inject 1,000 mLs into the vein daily as needed For hydration.        * Notice:  This list has 2 medication(s) that are the same as other medications prescribed for you. Read the directions carefully, and ask your doctor or other care provider to review them with you.

## 2018-11-13 NOTE — PATIENT INSTRUCTIONS
Pain control:   - increase fentanyl 400mg (4 patches to skin) every 72 hours  - hydromorphone PCA increased boluses to 1.2mg every 10 minutes as needed to control pain - home care will come to your home this afternoon  - Trial of intermittent suction of venting gtube - tried to place order - script given  - when not using suction, continue continuous venting of gtube - to gravity  - try to increase walking to see if that helps decrease pain      Follow up with me on 11/27 at 2pm

## 2018-11-13 NOTE — TELEPHONE ENCOUNTER
Received call back from  nurse Irene (phone #[phone]). She reports that she does not look at the pump, [facility] manages this so aside from breifly looking at it to make sure it looks okay doesn't do anything else with it. Reports that last week he was reporting more pain and reporting pressing his PCA button more frequently.     Connected Dr. Beckham with Micaela, pharmacist with [facility] to further discuss.

## 2018-11-13 NOTE — TELEPHONE ENCOUNTER
Called [facility] to get information on how much dilaudid patient is using - was directed to call [facility]. Called [facility], they will have to send message to nurse to call me back. Provided my direct contact info and requested a call specifically with information about usage of his PCA - how much dilaudid on average over what time period. Will wait for call back.

## 2018-11-13 NOTE — PROGRESS NOTES
Palliative Care Outpatient Clinic Progress Note    Patient Name:  Sebas Lopez  Primary Provider:  Jaguar Becker    Chief Complaint:   metastatic stage IV colon cancer  Abdominal pain due to above   Fatigue    Summary: 53-year-old male with stage IV metastatic colon cancer with peritoneal carcinomatosis and obstructing sigmoid colon mass, receiving TPN, continuous venting of gtube, ostomy in place.  The last scan on October 4, 2018 showed further disease progression and at that time, Dr. Long discussed use of Regorafenib, although he was worried that the side effects would be too great and that this was not a good option.  He did recommend considering no further anticancer treatment and focusing only on symptom management with hospice.  Recent hospitalization from 10/19-10/23 for PICC line infection.    Last Palliative care appointment: 10/4/2018     Reviewed: Yes    Interim History:  Sebas returns today to discuss increasing abdominal pain.  Sebas states that the pain today is about a 4 and that he feels that since his hospitalization for the PICC line infection his pain has been worse.  He does not really feel that the pain has worsened dramatically over the past few days and feels that the increased pain is just a gradual increase since hospitalization.  The pain feels like a increased pressure in his lower abdomen and is not relieved when he pushes his hydromorphone PCA pump.  He does believe that the fentanyl patches are helping him very much.  He thinks he is probably sleeping a little less because of the pain although at times feels he sleeps well.  He is accompanied today by his mother and father and his mother wonders if the pain is preventing him from being as active as he used to be.  Sebas does claim to be more tired than normal and does not know if this is because of the pain or because he is not sleeping.    Normal output from the ostomy.  Normal output from venting G-tube.  Sebas wonders if  "the G-tube worse hooked up to suction if he would feel better as the abdominal pain feels like a pressure and buildup of fluid in his abdomen.  States that his venting G-tube is draining normally but still wonders if more could come out with suction.  No increased nausea, no shortness of breath,    Sebas believes that he possibly needs an increase in his fentanyl.  And also thinks that he needs to walk more as this will help more than anything in improving his pain.    I asked Sebas about his last visit with oncology, Linda Alves.  He tells me that Dr. Haley had suggested a chemotherapy pill but that he still was not sure if he wanted to do that because of all the side effects yet he also states that he is not ready \"to give up\".  When I asked him what it means to give up, Sebas states that it means going into hospice and he is not ready for that.      I told Sebas that I was very happy he was in general feeling well enough to be with his girlfriend and family and get out of the house and that I hoped this could continue.  I asked him if he wanted to talk about the \"what ifs\" or possibly how much time he had left.  I explained that for some people they found that helpful to talk about the \"what if's\" and time so that they could plan and also continue to hope to feel good as long as possible.  Sebas stated he did not want to talk about this or think about it.  He feels that when it happens it just happens and he will know it is time.    Review of Systems:  ROS: 10 point ROS neg other than the symptoms noted above in the HPI       Allergies   Allergen Reactions     Liquid Adhesive Rash     Dermabond, rash with pustules, occurred with abdominal surgery and port removal      Current Outpatient Prescriptions   Medication Sig Dispense Refill     fentaNYL (DURAGESIC) 100 mcg/hr 72 hr patch Place 3 patches onto the skin every 72 hours remove old patches. 30 patch 0     hydromorphone HCl 500 MG/50ML SOLN Dilute 250mg in " 250ml NS    0.9mg IV every 10 mins as needed    Dispense 1 bag (250mg = 25ml) 25 mL 0     loperamide (IMODIUM) 2 MG capsule Take 2 mg by mouth as needed for diarrhea       LORazepam (ATIVAN) 0.5 MG tablet Take 1 tablet (0.5 mg) by mouth every 4 hours as needed (Anxiety, Nausea/Vomiting or Sleep) 30 tablet 5     ondansetron (ZOFRAN-ODT) 8 MG ODT tab Take 1 tablet (8 mg) by mouth every 8 hours as needed for nausea 60 tablet 5     order for DME Equipment being ordered: home suction machine with tubing for connection to venting G-tube  Treatment Diagnosis: colon adenocarcinoma 1 Device 0     parenteral nutrition - PTA/DISCHARGE ORDER Inject into the vein daily [facility]       prochlorperazine (COMPAZINE) 10 MG tablet 10 mg every 6 hours as needed        sodium chloride 0.9% SOLN BOLUS Inject 1,000 mLs into the vein daily as needed For hydration. 1000 mL 0     hydromorphone HCl 500 MG/50ML SOLN Dilute 250mg in 250ml NS    0.9mg IV every 10 mins as needed    Dispense 1 bag (250mg = 25ml) (Patient not taking: Reported on 11/13/2018) 25 mL 0     hydromorphone HCl 500 MG/50ML SOLN Dilute 250mg in 250ml NS    0.9mg IV every 10 mins as needed    Dispense 1 bag (250mg = 25ml) (Patient not taking: Reported on 11/13/2018) 25 mL 0     hydromorphone HCl 500 MG/50ML SOLN Dilute 250mg in 250ml NS    0.9mg IV every 10 mins as needed    Dispense 1 bag (250mg = 25ml) (Patient not taking: Reported on 11/13/2018) 25 mL 0     hydromorphone HCl 500 MG/50ML SOLN Dilute 250mg in 250ml NS    0.9mg IV every 10 mins as needed    Dispense 1 bag (250mg = 25ml) (Patient not taking: Reported on 11/13/2018) 25 mL 0     [START ON 11/15/2018] hydromorphone HCl 500 MG/50ML SOLN Dilute 250mg in 250ml NS    0.9mg IV every 10 mins as needed    Dispense 1 bag (250mg = 25ml) (Patient not taking: Reported on 11/13/2018) 25 mL 0     naloxone (NARCAN) nasal spray Spray 1 spray (4 mg) into one nostril alternating nostrils as needed for opioid reversal every 2-3  minutes until assistance arrives (Patient not taking: Reported on 11/13/2018) 0.2 mL 0     Past Medical History:   Diagnosis Date     Adenocarcinoma of sigmoid colon (H)     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     Ulcerative colitis (H)      Past Surgical History:   Procedure Laterality Date     COLONOSCOPY  2017     COLONOSCOPY N/A 11/30/2017    Procedure: COLONOSCOPY;  Colonoscopy;  Surgeon: Rob Dudley MD;  Location: [facility]     COLONOSCOPY N/A 2/6/2018    Procedure: COMBINED COLONOSCOPY, STENT PLACEMENT;  COMBINED COLONOSCOPY with Colonic Stent Placement;  Surgeon: Guru Lionel Mar MD;  Location: [facility]     COLONOSCOPY N/A 3/19/2018    Procedure: COMBINED COLONOSCOPY, STENT PLACEMENT;  flexible sigmoidoscopy with stent placement and dilation;  Surgeon: Alexis Rios MD;  Location: [facility]     COLONOSCOPY N/A 7/5/2018    Procedure: COMBINED COLONOSCOPY, STENT PLACEMENT;  flexible sigmoidoscopy with colonic stent placement ;  Surgeon: Alexis Rios MD;  Location: [facility]     GI SURGERY  07/10/2017    Extensive lysis of adhesions, small bowel resection with end ileostomy.      HERNIA REPAIR       INSERT PORT VASCULAR ACCESS Right 8/10/2017    Procedure: INSERT PORT VASCULAR ACCESS;  Single Lumen Right Chest Power Port;  Surgeon: Malena Andrew PA-C;  [facility]     LAPAROSCOPY DIAGNOSTIC (GENERAL) N/A 12/6/2017    Procedure: LAPAROSCOPY DIAGNOSTIC (GENERAL);  Diagnostic Laparoscopy, Exploratory Laparotomy Anesthesia Block ;  Surgeon: Rob Dudley MD;  Location: [facility]     LAPAROTOMY EXPLORATORY N/A 12/6/2017    Procedure: LAPAROTOMY EXPLORATORY;;  Surgeon: Rob Dudley MD;  Location: [facility]     ORTHOPEDIC SURGERY Left     HIP ARTHROPLASTY  "    PICC INSERTION Right 02/23/2018    5Fr DL BioFlo PICC, 44cm (3cm external) in the R basilic vein w/ tip in the SVC RA junction           /74 (BP Location: Right arm, Patient Position: Chair, Cuff Size: Adult Regular)  Pulse 127  Temp 98.1  F (36.7  C) (Oral)  Resp 18  Ht 1.752 m (5' 8.98\")  Wt 67.6 kg (149 lb)  SpO2 98%  BMI 22.02 kg/m2  General: Alert, appears slightly older than stated age, slightly frail-appearing, in no acute distress  Eyes: EOMI, sclera clear  HEENT: NC/AT; mucous membranes dry  Lungs: No increased work of breathing, speaking full sentences   Abdomen: +BS, soft, no significant tenderness to palpation, no rebound or guarding  Neuro: A&O x 3; CN II-XII grossly intact; gait normal  Neuropsych: Alert, good eye contact, engaged, pleasant, sensorium intact      Key Data Reviewed:  GFR > 90;   CT chest ab pelvis 10/4/18:  IMPRESSION:   1. Interval percutaneous gastrostomy tube placement with gastric decompression and improvement in previously seen small bowel  dilatation.   2. Overall evidence of disease progression with increase in extensive carcinomatosis and peritoneal tumor burden as well as enlargement of mesenteric and retroperitoneal lymph nodes. Tumor extension along the liver and subcutaneous soft tissues is also mildly increased. Marked flattening of the infrarenal IVC with adjacent tumor.     Impression:     53-year-old male with stage IV metastatic colon cancer with peritoneal carcinomatosis and obstructing sigmoid colon mass, receiving TPN, continuous venting of gtube, ostomy in place.  The last scan on October 4, 2018 showed further disease progression and at that time, Dr. Long discussed use of Regorafenib, although he was worried that the side effects would be too great and that this was not a good option.  He did recommend considering no further anticancer treatment and focusing only on symptom management with hospice.     Recommendations & Counseling:        Pain " control: I worry that increasing the fentanyl from 300 400 mcg will not provide sufficient control of his pain and I explained this to Sebas.  I suggested adding a basal hydromorphone to the PCA along with increasing the bolus doses but he did not want to make that change at this time.  We agreed to the changes discussed below with follow-up in 2 weeks.  He knows to call before then if having any problems and pain control.  - increase fentanyl 400mcg (4 patches to skin) every 72 hours  - hydromorphone PCA increased boluses to 1.2mg every 10 minutes as needed to control pain - home care will come to your home this afternoon  - Trial of intermittent suction of venting gtube - order placed  - when not using suction, continue continuous venting of gtube - to gravity  - try to increase walking to see if that helps decrease pain  - can also consider adding dexamethasone back if aboe not effective    Goals: Sebas wants to continue to do everything he is currently doing, including TPN, eating or drinking when he feels like it, being as active as he wants to, controlling the pain.  He also wants to continue to consider whether the pill chemotherapy is something he wants to take or not and he is still hoping that there might be more options for anti-cancer treatment therapy in the future.  Right now he does not want to enroll in hospice and he does not want to talk about what lies ahead.  I did ask him if I could continue to ask him these questions in future appointments and he was fine with this.  His mother was tearful throughout our discussions but did not ask further questions.    Follow up with me on 11/27 at 2pm      Thank you for involving us in the patient's care. 45 minutes face time over half spent counseling.  Lydia Beckham MD / Palliative Medicine / Pager [phone] / After-Hours Answering Service [phone] / Main Palliative Clinic - [phone] / [facility] Inpatient Team Consult Pager [phone]  (answered 8am-430pm M-F)

## 2018-11-13 NOTE — NURSING NOTE
"Oncology Rooming Note    November 13, 2018 10:46 AM   Sebas Lopez is a 53 year old male who presents for:    Chief Complaint   Patient presents with     Oncology Clinic Visit     [facility] RETURN- PAIN CONTROL     Initial Vitals: /74 (BP Location: Right arm, Patient Position: Chair, Cuff Size: Adult Regular)  Pulse 127  Temp 98.1  F (36.7  C) (Oral)  Resp 18  Ht 1.752 m (5' 8.98\")  Wt 67.6 kg (149 lb)  SpO2 98%  BMI 22.02 kg/m2 Estimated body mass index is 22.02 kg/(m^2) as calculated from the following:    Height as of this encounter: 1.752 m (5' 8.98\").    Weight as of this encounter: 67.6 kg (149 lb). Body surface area is 1.81 meters squared.  Moderate Pain (4) Comment: Data Unavailable   No LMP for male patient.  Allergies reviewed: Yes  Medications reviewed: Yes    Medications: Medication refills not needed today.  Pharmacy name entered into [facility]:    [facility] - [place] - [place], [place] - [address] [place] [address] [facility]    Clinical concerns: Pain level has been increasing a little. [place] was notified.    10 minutes for nursing intake (face to face time)     Vini Devries LPN            "

## 2018-11-13 NOTE — LETTER
11/13/2018       RE: Sebas Lopez  [address] [place] [address]     Dear Colleague,    Thank you for referring your patient, Sebas Lopez, to the [facility] at [facility]. Please see a copy of my visit note below.    Palliative Care Outpatient Clinic Progress Note    Patient Name:  Sebas Lopez  Primary Provider:  Jaguar Becker    Chief Complaint:   metastatic stage IV colon cancer  Abdominal pain due to above   Fatigue    Summary: 53-year-old male with stage IV metastatic colon cancer with peritoneal carcinomatosis and obstructing sigmoid colon mass, receiving TPN, continuous venting of gtube, ostomy in place.  The last scan on October 4, 2018 showed further disease progression and at that time, Dr. Long discussed use of Regorafenib, although he was worried that the side effects would be too great and that this was not a good option.  He did recommend considering no further anticancer treatment and focusing only on symptom management with hospice.  Recent hospitalization from 10/19-10/23 for PICC line infection.    Last Palliative care appointment: 10/4/2018     Reviewed: Yes    Interim History:  Sebas returns today to discuss increasing abdominal pain.  Sebas states that the pain today is about a 4 and that he feels that since his hospitalization for the PICC line infection his pain has been worse.  He does not really feel that the pain has worsened dramatically over the past few days and feels that the increased pain is just a gradual increase since hospitalization.  The pain feels like a increased pressure in his lower abdomen and is not relieved when he pushes his hydromorphone PCA pump.  He does believe that the fentanyl patches are helping him very much.  He thinks he is probably sleeping a little less because of the pain although at times feels he sleeps well.  He is accompanied today by his mother and father and his  "mother wonders if the pain is preventing him from being as active as he used to be.  Sebas does claim to be more tired than normal and does not know if this is because of the pain or because he is not sleeping.    Normal output from the ostomy.  Normal output from venting G-tube.  Sebas wonders if the G-tube worse hooked up to suction if he would feel better as the abdominal pain feels like a pressure and buildup of fluid in his abdomen.  States that his venting G-tube is draining normally but still wonders if more could come out with suction.  No increased nausea, no shortness of breath,    Sebas believes that he possibly needs an increase in his fentanyl.  And also thinks that he needs to walk more as this will help more than anything in improving his pain.    I asked Sebas about his last visit with oncology, Linda Alves.  He tells me that Dr. Haley had suggested a chemotherapy pill but that he still was not sure if he wanted to do that because of all the side effects yet he also states that he is not ready \"to give up\".  When I asked him what it means to give up, Sebas states that it means going into hospice and he is not ready for that.      I told Sebas that I was very happy he was in general feeling well enough to be with his girlfriend and family and get out of the house and that I hoped this could continue.  I asked him if he wanted to talk about the \"what ifs\" or possibly how much time he had left.  I explained that for some people they found that helpful to talk about the \"what if's\" and time so that they could plan and also continue to hope to feel good as long as possible.  Sebas stated he did not want to talk about this or think about it.  He feels that when it happens it just happens and he will know it is time.    Review of Systems:  ROS: 10 point ROS neg other than the symptoms noted above in the HPI       Allergies   Allergen Reactions     Liquid Adhesive Rash     Dermabond, rash with pustules, " occurred with abdominal surgery and port removal      Current Outpatient Prescriptions   Medication Sig Dispense Refill     fentaNYL (DURAGESIC) 100 mcg/hr 72 hr patch Place 3 patches onto the skin every 72 hours remove old patches. 30 patch 0     hydromorphone HCl 500 MG/50ML SOLN Dilute 250mg in 250ml NS    0.9mg IV every 10 mins as needed    Dispense 1 bag (250mg = 25ml) 25 mL 0     loperamide (IMODIUM) 2 MG capsule Take 2 mg by mouth as needed for diarrhea       LORazepam (ATIVAN) 0.5 MG tablet Take 1 tablet (0.5 mg) by mouth every 4 hours as needed (Anxiety, Nausea/Vomiting or Sleep) 30 tablet 5     ondansetron (ZOFRAN-ODT) 8 MG ODT tab Take 1 tablet (8 mg) by mouth every 8 hours as needed for nausea 60 tablet 5     order for DME Equipment being ordered: home suction machine with tubing for connection to venting G-tube  Treatment Diagnosis: colon adenocarcinoma 1 Device 0     parenteral nutrition - PTA/DISCHARGE ORDER Inject into the vein daily [facility]       prochlorperazine (COMPAZINE) 10 MG tablet 10 mg every 6 hours as needed        sodium chloride 0.9% SOLN BOLUS Inject 1,000 mLs into the vein daily as needed For hydration. 1000 mL 0     hydromorphone HCl 500 MG/50ML SOLN Dilute 250mg in 250ml NS    0.9mg IV every 10 mins as needed    Dispense 1 bag (250mg = 25ml) (Patient not taking: Reported on 11/13/2018) 25 mL 0     hydromorphone HCl 500 MG/50ML SOLN Dilute 250mg in 250ml NS    0.9mg IV every 10 mins as needed    Dispense 1 bag (250mg = 25ml) (Patient not taking: Reported on 11/13/2018) 25 mL 0     hydromorphone HCl 500 MG/50ML SOLN Dilute 250mg in 250ml NS    0.9mg IV every 10 mins as needed    Dispense 1 bag (250mg = 25ml) (Patient not taking: Reported on 11/13/2018) 25 mL 0     hydromorphone HCl 500 MG/50ML SOLN Dilute 250mg in 250ml NS    0.9mg IV every 10 mins as needed    Dispense 1 bag (250mg = 25ml) (Patient not taking: Reported on 11/13/2018) 25 mL 0     [START ON 11/15/2018] hydromorphone HCl 500  MG/50ML SOLN Dilute 250mg in 250ml NS    0.9mg IV every 10 mins as needed    Dispense 1 bag (250mg = 25ml) (Patient not taking: Reported on 11/13/2018) 25 mL 0     naloxone (NARCAN) nasal spray Spray 1 spray (4 mg) into one nostril alternating nostrils as needed for opioid reversal every 2-3 minutes until assistance arrives (Patient not taking: Reported on 11/13/2018) 0.2 mL 0     Past Medical History:   Diagnosis Date     Adenocarcinoma of sigmoid colon (H)     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     Ulcerative colitis (H)      Past Surgical History:   Procedure Laterality Date     COLONOSCOPY  2017     COLONOSCOPY N/A 11/30/2017    Procedure: COLONOSCOPY;  Colonoscopy;  Surgeon: Rob Dudley MD;  Location: [facility]     COLONOSCOPY N/A 2/6/2018    Procedure: COMBINED COLONOSCOPY, STENT PLACEMENT;  COMBINED COLONOSCOPY with Colonic Stent Placement;  Surgeon: Guru Lionel Mar MD;  Location: [facility]     COLONOSCOPY N/A 3/19/2018    Procedure: COMBINED COLONOSCOPY, STENT PLACEMENT;  flexible sigmoidoscopy with stent placement and dilation;  Surgeon: Alexis Rios MD;  Location: [facility]     COLONOSCOPY N/A 7/5/2018    Procedure: COMBINED COLONOSCOPY, STENT PLACEMENT;  flexible sigmoidoscopy with colonic stent placement ;  Surgeon: Alexis Rios MD;  Location: [facility]     GI SURGERY  07/10/2017    Extensive lysis of adhesions, small bowel resection with end ileostomy.      HERNIA REPAIR       INSERT PORT VASCULAR ACCESS Right 8/10/2017    Procedure: INSERT PORT VASCULAR ACCESS;  Single Lumen Right Chest Power Port;  Surgeon: Malena Andrew PA-C;  [facility]     LAPAROSCOPY DIAGNOSTIC (GENERAL) N/A 12/6/2017    Procedure: LAPAROSCOPY DIAGNOSTIC (GENERAL);  Diagnostic  "Laparoscopy, Exploratory Laparotomy Anesthesia Block ;  Surgeon: Rob Dudley MD;  Location: [facility]     LAPAROTOMY EXPLORATORY N/A 12/6/2017    Procedure: LAPAROTOMY EXPLORATORY;;  Surgeon: Rob Dudley MD;  Location: [facility]     ORTHOPEDIC SURGERY Left     HIP ARTHROPLASTY     PICC INSERTION Right 02/23/2018    5Fr DL BioFlo PICC, 44cm (3cm external) in the R basilic vein w/ tip in the SVC RA junction           /74 (BP Location: Right arm, Patient Position: Chair, Cuff Size: Adult Regular)  Pulse 127  Temp 98.1  F (36.7  C) (Oral)  Resp 18  Ht 1.752 m (5' 8.98\")  Wt 67.6 kg (149 lb)  SpO2 98%  BMI 22.02 kg/m2  General: Alert, appears slightly older than stated age, slightly frail-appearing, in no acute distress  Eyes: EOMI, sclera clear  HEENT: NC/AT; mucous membranes dry  Lungs: No increased work of breathing, speaking full sentences   Abdomen: +BS, soft, no significant tenderness to palpation, no rebound or guarding  Neuro: A&O x 3; CN II-XII grossly intact; gait normal  Neuropsych: Alert, good eye contact, engaged, pleasant, sensorium intact      Key Data Reviewed:  GFR > 90;   CT chest ab pelvis 10/4/18:  IMPRESSION:   1. Interval percutaneous gastrostomy tube placement with gastric decompression and improvement in previously seen small bowel  dilatation.   2. Overall evidence of disease progression with increase in extensive carcinomatosis and peritoneal tumor burden as well as enlargement of mesenteric and retroperitoneal lymph nodes. Tumor extension along the liver and subcutaneous soft tissues is also mildly increased. Marked flattening of the infrarenal IVC with adjacent tumor.     Impression:     53-year-old male with stage IV metastatic colon cancer with peritoneal carcinomatosis and obstructing sigmoid colon mass, receiving TPN, continuous venting of gtube, ostomy in place.  The last scan on October 4, 2018 showed further disease progression and at that time, Dr. Long " discussed use of Regorafenib, although he was worried that the side effects would be too great and that this was not a good option.  He did recommend considering no further anticancer treatment and focusing only on symptom management with hospice.     Recommendations & Counseling:        Pain control: I worry that increasing the fentanyl from 300 400 mcg will not provide sufficient control of his pain and I explained this to Sebas.  I suggested adding a basal hydromorphone to the PCA along with increasing the bolus doses but he did not want to make that change at this time.  We agreed to the changes discussed below with follow-up in 2 weeks.  He knows to call before then if having any problems and pain control.  - increase fentanyl 400mcg (4 patches to skin) every 72 hours  - hydromorphone PCA increased boluses to 1.2mg every 10 minutes as needed to control pain - home care will come to your home this afternoon  - Trial of intermittent suction of venting gtube - order placed  - when not using suction, continue continuous venting of gtube - to gravity  - try to increase walking to see if that helps decrease pain  - can also consider adding dexamethasone back if aboe not effective    Goals: Sebas wants to continue to do everything he is currently doing, including TPN, eating or drinking when he feels like it, being as active as he wants to, controlling the pain.  He also wants to continue to consider whether the pill chemotherapy is something he wants to take or not and he is still hoping that there might be more options for anti-cancer treatment therapy in the future.  Right now he does not want to enroll in hospice and he does not want to talk about what lies ahead.  I did ask him if I could continue to ask him these questions in future appointments and he was fine with this.  His mother was tearful throughout our discussions but did not ask further questions.    Follow up with me on 11/27 at 2pm    Again, thank you  for allowing me to participate in the care of your patient.      Sincerely,    Lydia Beckham MD

## 2018-11-14 NOTE — PROGRESS NOTES
This is a recent snapshot of the patient's [facility] medical record.  For current drug dose and complete information and questions, call [phone] or In Basket pool, [facility]v home infusion ([id number])  CSN Number:  [id number]

## 2018-11-15 NOTE — PROGRESS NOTES
This is a recent snapshot of the patient's [facility] medical record.  For current drug dose and complete information and questions, call [phone] or In Basket pool, fv home infusion ([id number])  CSN Number:  [id number]

## 2018-11-16 NOTE — PROGRESS NOTES
This is a recent snapshot of the patient's [facility] medical record.  For current drug dose and complete information and questions, call [phone] or In Basket pool, fv home infusion ([id number])  CSN Number:  [id number]

## 2018-11-20 NOTE — PROGRESS NOTES
This is a recent snapshot of the patient's [facility] medical record.  For current drug dose and complete information and questions, call [phone] or In Basket pool, fv home infusion ([id number])  CSN Number:  [id number]

## 2018-11-21 NOTE — TELEPHONE ENCOUNTER
With the increased fentanyl to 400mcg patches and increased boluses hydromorphone to 1.2mg q10 min, pain still severe, needing to push boluses often with minimal relieve of pain (decrease pain from 6/10 to 4-5/10. No sedation. No confusion. .     Past 72 hours, used 106.8mg dilaudid with bolus dosing which is 1.48mg/hr    Will stop fentanyl 400mcg patch which is approx 800mg OME or 80mg IV hydromorphone or 3mg IV hydromorphone qhr.  Will decrease for incomplete cross tolerance and start basal hydromorphone at 1.5mg/hr.  Will increase boluses to 1.5mg q10 min PRN.

## 2018-11-21 NOTE — TELEPHONE ENCOUNTER
Received call from Rachel, pharmacist at  [facility]. Patient's pain is still not controlled well with PCA bolus dose increase and fentanyl patch increase. Patient spoke with Rachel and shared he is open to not trying a continuous hydromorphone rate instead of fentanyl patches + only blus doses. She would like to speak with MD about making this change.    Rachel's direct call back # is [phone]    Will route to MD for review and to call Rachel back. Patient is scheduled for follow up apt with palliative care next Tuesday 11/27/2018.

## 2018-11-22 NOTE — TELEPHONE ENCOUNTER
[facility] calling regarding Dilaudid dosing. Patient is on PCA pump and having increased pain. Irene is requesting dosing change and orders.     Paged on call MD for Dr Long Hematology Oncology  through [employer] [facility] to call [facility] nurse at . Paged at 836 a.m..   Advised Irene to call back if no return call with in 20 minutes. Caller verbalized understanding. Denies further questions.    Linda Bone RN  [facility]

## 2018-11-22 NOTE — TELEPHONE ENCOUNTER
Pt's home nurse Irene Pizano (ph # [phone]) called back after speaking with the on-call oncologist Dr. Dubose and they stated she needs to speak with the Palliative care team physician who is managing this for the patient. Advised the nurse a new page will be placed and if she has not heard back in 20-30 minutes to call [facility] back. Called back the page  and page sent at 9:20 am to the on-call Palliative group ([phone]).    Previous note from FNA for the page sent for the Dilaudid dosing change request:    Linda Bone RN        11/22/18 8:28 AM   Note      [facility] calling regarding Dilaudid dosing. Patient is on PCA pump and having increased pain. Irene is requesting dosing change and orders.      Paged on call MD for Dr Long Hematology Oncology  through [employer] [facility] to call Irene RN [facility] nurse at . Paged at 836 a.m..   Advised Irene to call back if no return call with in 20 minutes. Caller verbalized understanding. Denies further questions.     Linda Bone, OLGA LIDIA  [facility]           Theresa Pulliam RN [facility]       Reason for Disposition    [1] Follow-up call from patient regarding patient's clinical status AND [2] information urgent    Protocols used: PCP CALL - NO TRIAGE-ADULT-

## 2018-11-23 NOTE — PROGRESS NOTES
This is a recent snapshot of the patient's [facility] medical record.  For current drug dose and complete information and questions, call [phone] or In Basket pool, fv home infusion ([id number])  CSN Number:  [id number]

## 2018-11-23 NOTE — PROGRESS NOTES
This is a recent snapshot of the patient's [facility] medical record.  For current drug dose and complete information and questions, call [phone] or In Basket pool, fv home infusion ([id number])  CSN Number:  [id number]

## 2018-11-23 NOTE — TELEPHONE ENCOUNTER
Pain is pressure sensation across lower abdomen - constant. No sedation or confusion on increased hydromorphone.     10/21:  Will stop fentanyl 400mcg patch which is approx 800mg OME or 80mg IV hydromorphone or 3mg IV hydromorphone qhr.  Will decrease for incomplete cross tolerance and start basal hydromorphone at 1.5mg/hr.  Will increase boluses to 1.5mg q10 min PRN.     10/22 - oncology paged and pain was not controlled on above adjustment and PCA was again adjusted as pain was back up to 6-7.    Basal hydromorphone 2.5mg with boluses of 2.5mg q10 min PRN    **addendum 10/22 - Palliative team (myself-Yoon Fish) was the one who was paged on 10/22 and I made the adjustments in PCA to 2.5mg/hr bolus and 2.5mg q 10 min prn. This was based on 24 hour average use on PCA of 4.25mg/hour.     10/23 - nurse called me to say that this morning, pain was terrible and back up to 6-7 but after using frequent PCA boluses, he was able to get pain back down to 3-4/10. Over past 24 hrs, with basal of 2.5mg/hr, he used 48 boluses for a total of 120mg hydromorphone from bolus dosing plus 2.5mg/hr basal. That is approx equivalent of 7.5mg / hr hydromorphone.     Will increase PCA hydromorphone to 3.5mg/hr basal rate and increase boluses to 3.5mg q10 min PRN.  Also added dexamethasone liquid (he is not venting very often but unclear if he is absorbing anything PO). Dexamethasone liquid 4mg qAM x 6 days then 2mg x 6 days then stop.

## 2018-11-26 NOTE — PROGRESS NOTES
This is a recent snapshot of the patient's [facility] medical record.  For current drug dose and complete information and questions, call [phone] or In Basket pool, fv home infusion ([id number])  CSN Number:  [id number]

## 2018-11-27 NOTE — NURSING NOTE
"Oncology Rooming Note    November 27, 2018 2:28 PM   Sebas Lopez is a 53 year old male who presents for:    Chief Complaint   Patient presents with     Oncology Clinic Visit     Return: Palliative Pain Control     Initial Vitals: /75  Pulse 119  Temp 99.1  F (37.3  C) (Tympanic)  Resp 16  Ht 1.753 m (5' 9\")  Wt 66.7 kg (147 lb 2 oz)  SpO2 97%  BMI 21.73 kg/m2 Estimated body mass index is 21.73 kg/(m^2) as calculated from the following:    Height as of this encounter: 1.753 m (5' 9\").    Weight as of this encounter: 66.7 kg (147 lb 2 oz). Body surface area is 1.8 meters squared.  Moderate Pain (4) Comment: Data Unavailable   No LMP for male patient.  Allergies reviewed: Yes  Medications reviewed: Yes    Medications:  Medication refills today are the Lorzepam and the compazine  Pharmacy name entered into [facility]:    [facility] [place] [address] [place] [address] HOME INFUSION    Clinical concerns: new concerns are that he is still not sleeping well at night and still having issues with the pain control Dr. Beckham was notified.    10 minutes for nursing intake (face to face time)     Mike Bridges CMA              "

## 2018-11-27 NOTE — LETTER
11/27/2018       RE: Sebas Lopez  [address] [place] [address]     Dear Colleague,    Thank you for referring your patient, Sebas Lopez, to the [facility] at [facility]. Please see a copy of my visit note below.    Palliative Care Outpatient Clinic Progress Note    Patient Name:  Sebas Lopez  Primary Provider:  Jaguar Becker    Chief Complaint:   metastatic stage IV colon cancer  Abdominal pain due to above   Fatigue     Summary: 53-year-old male with stage IV metastatic colon cancer with peritoneal carcinomatosis and obstructing sigmoid colon mass, receiving TPN, venting of gtube when needed, ostomy in place.  The last scan on October 4, 2018 showed further disease progression and at that time, Dr. Long discussed use of Regorafenib, although he was worried that the side effects would be too great and that this was not a good option.  He did recommend considering no further anticancer treatment and focusing only on symptom management with hospice.  Recent hospitalization from 10/19-10/23 for PICC line infection.    Last Palliative care appointment: 11/13     Reviewed: Reviewed    Interim History:  Sebas presents for follow-up on symptom management.    Pain currently is 3-4 in abdomen and feels like a throbbing or ache and always there. Not butler. At this level, too strong to be able to sleep. Pain this morning was really bad and wasn't able to get comfortable. He rated the pain at a 6 this morning with home care nurse.  He sleeps for at most 2 hours at a stretch before needing to catch up on his pain control.  Pain does seem to respond when he pushes her bolus dose although needing it several times an hour.  When pain is really severe he will have nausea as he did this morning. Took compazine and the nausea slowly resolved as did the pain.  Only needing Compazine occasionally for nausea.    His home care nurse recommended  "taking Lorazepam before bed at night as she thought he might have trouble sleeping on the steroids.  She also thought it might help him \"mentally\" meaning dealing with his situation better. Started it at night about 4 days ago at home care nurse suggestion. Helps with pain and relaxation.     Started dexamethasone on Friday and was taking it at night but just told today that should take it in the morning.     Medications:   - home hydromorphone PCA 3.5mg basal and 3.5mg boluses q10 min PRN  Last 24 hours - 237mg total with 81mg via basal rate and 155.6mg via boluses which is 9.8mg per hour average  Dexamethasone 4 mg x 6 days then decrease to 2 mg x 6 days then stop    Venting G-tube does not need to be vented very often and for the most part he always leaves it closed.  When he has increased pain or nausea he does open up the G-tube.    Decrease output from the ostomy over the past week but still with some output.  He is wondering if the decreased ostomy output is due to the increased pain medications.    Review of Systems:  ROS: 10 point ROS neg other than the symptoms noted above in the HPI     Social History:    Sig other, lawanda accompanied Brain to visit today.         Allergies   Allergen Reactions     Liquid Adhesive Rash     Dermabond, rash with pustules, occurred with abdominal surgery and port removal      Current Outpatient Prescriptions   Medication Sig Dispense Refill     dexamethasone (DECADRON) 2 MG tablet        hydromorphone HCl 500 MG/50ML SOLN Dilute 250mg in 250ml NS    0.9mg IV every 10 mins as needed    Dispense 1 bag (250mg = 25ml) 25 mL 0     LORazepam (ATIVAN) 0.5 MG tablet Take 1 tablet (0.5 mg) by mouth every 4 hours as needed (Anxiety, Nausea/Vomiting or Sleep) 30 tablet 5     ondansetron (ZOFRAN-ODT) 8 MG ODT tab Take 1 tablet (8 mg) by mouth every 8 hours as needed for nausea 60 tablet 5     parenteral nutrition - PTA/DISCHARGE ORDER Inject into the vein daily [facility]       " prochlorperazine (COMPAZINE) 10 MG tablet 10 mg every 6 hours as needed        fentaNYL (DURAGESIC) 100 mcg/hr 72 hr patch Place 4 patches onto the skin every 72 hours remove old patches. (Patient not taking: Reported on 11/27/2018) 40 patch 0     [START ON 12/24/2018] hydromorphone HCl 500 MG/50ML SOLN Dilute 300mg in 300ml NS    1.2mg IV every 10 mins as needed    Dispense 1 bag (300mg = 30ml = 7 day supply) (Patient not taking: Reported on 11/27/2018) 30 mL 0     [START ON 12/17/2018] hydromorphone HCl 500 MG/50ML SOLN Dilute 300mg in 300ml NS    1.2mg IV every 10 mins as needed    Dispense 1 bag (300mg = 30ml = 7 day supply) (Patient not taking: Reported on 11/27/2018) 30 mL 0     [START ON 12/10/2018] hydromorphone HCl 500 MG/50ML SOLN Dilute 300mg in 300ml NS    1.2mg IV every 10 mins as needed    Dispense 1 bag (300mg = 30ml = 7 day supply) (Patient not taking: Reported on 11/27/2018) 30 mL 0     [START ON 12/3/2018] hydromorphone HCl 500 MG/50ML SOLN Dilute 300mg in 300ml NS    1.2mg IV every 10 mins as needed    Dispense 1 bag (300mg = 30ml = 7 day supply) (Patient not taking: Reported on 11/27/2018) 30 mL 0     hydromorphone HCl 500 MG/50ML SOLN Dilute 300mg in 300ml NS    1.2mg IV every 10 mins as needed    Dispense 1 bag (300mg = 30ml = 7 day supply) (Patient not taking: Reported on 11/27/2018) 30 mL 0     hydromorphone HCl 500 MG/50ML SOLN Dilute 300mg in 300ml NS    1.2mg IV every 10 mins as needed    Dispense 1 bag (300mg = 30ml = 7 day supply) (Patient not taking: Reported on 11/27/2018) 30 mL 0     loperamide (IMODIUM) 2 MG capsule Take 2 mg by mouth as needed for diarrhea       naloxone (NARCAN) nasal spray Spray 1 spray (4 mg) into one nostril alternating nostrils as needed for opioid reversal every 2-3 minutes until assistance arrives (Patient not taking: Reported on 11/13/2018) 0.2 mL 0     order for DME Equipment being ordered: intermittent suction equipment for venting gtube (Patient not  taking: Reported on 11/27/2018) 1 Units 0     order for DME Equipment being ordered: home suction machine with tubing for connection to venting G-tube  Treatment Diagnosis: colon adenocarcinoma (Patient not taking: Reported on 11/27/2018) 1 Device 0     sodium chloride 0.9% SOLN BOLUS Inject 1,000 mLs into the vein daily as needed For hydration. 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     Ulcerative colitis (H)      Past Surgical History:   Procedure Laterality Date     COLONOSCOPY  2017     COLONOSCOPY N/A 11/30/2017    Procedure: COLONOSCOPY;  Colonoscopy;  Surgeon: Rob Dudley MD;  Location: [facility]     COLONOSCOPY N/A 2/6/2018    Procedure: COMBINED COLONOSCOPY, STENT PLACEMENT;  COMBINED COLONOSCOPY with Colonic Stent Placement;  Surgeon: Guru Lionel Mar MD;  Location: [facility]     COLONOSCOPY N/A 3/19/2018    Procedure: COMBINED COLONOSCOPY, STENT PLACEMENT;  flexible sigmoidoscopy with stent placement and dilation;  Surgeon: Alexis Rios MD;  Location: [facility]     COLONOSCOPY N/A 7/5/2018    Procedure: COMBINED COLONOSCOPY, STENT PLACEMENT;  flexible sigmoidoscopy with colonic stent placement ;  Surgeon: Alexis Rios MD;  Location: [facility]     GI SURGERY  07/10/2017    Extensive lysis of adhesions, small bowel resection with end ileostomy.      HERNIA REPAIR       INSERT PORT VASCULAR ACCESS Right 8/10/2017    Procedure: INSERT PORT VASCULAR ACCESS;  Single Lumen Right Chest Power Port;  Surgeon: Malena Andrew PA-C;  [facility]     LAPAROSCOPY DIAGNOSTIC (GENERAL) N/A 12/6/2017    Procedure: LAPAROSCOPY DIAGNOSTIC (GENERAL);  Diagnostic Laparoscopy, Exploratory  "Laparotomy Anesthesia Block ;  Surgeon: Rob Dudley MD;  Location: [facility]     LAPAROTOMY EXPLORATORY N/A 12/6/2017    Procedure: LAPAROTOMY EXPLORATORY;;  Surgeon: Rob Dudley MD;  Location: [facility]     ORTHOPEDIC SURGERY Left     HIP ARTHROPLASTY     PICC INSERTION Right 02/23/2018    5Fr DL BioFlo PICC, 44cm (3cm external) in the R basilic vein w/ tip in the SVC RA junction           /75  Pulse 119  Temp 99.1  F (37.3  C) (Tympanic)  Resp 16  Ht 1.753 m (5' 9\")  Wt 66.7 kg (147 lb 2 oz)  SpO2 97%  BMI 21.73 kg/m2  General: Well-groomed, appears older than stated age, alert, in no acute distress  Eyes: EOMI, sclera clear  HEENT: NC/AT; mucous membranes moist  Lungs: No increased work of breathing, speaking full sentences   Abdomen: Firm, nontender to palpation, no rebound or guarding, no increased erythema or warmth surrounding ostomy or venting G-tube  Neuro: A&O x 3; CN II-XII grossly intact; gait normal  Neuropsych: Alert, good eye contact, engaged, affect full, pleasant, sensorium intact      Key Data Reviewed:  reviewed      Impression:     Sebas is a 53 yr old male with stage IV metastatic colon cancer with peritoneal carcinomatosis and obstructing sigmoid colon mass,, venting gtube, on TPN, still with ostomy output but unclear oral absorption. Increasing abdominal pain over past weeks.     Recommendations & Counseling:      Pain management in patient with stage IV metastatic colon cancer with peritoneal carcinomatosis and obstructing sigmoid colon mass, venting gtube, on TPN, still with ostomy output but unclear oral absorption. Pain much better controlled this week than last week when transition was made off fentanyl patches and on to PCA hydromorphone continuous and boluses but still needing very frequent bolus dosing. Currently home hydromorphone PCA 3.5mg basal and 3.5mg boluses q10 min PRN. Last 24 hours - 237mg total with 81mg via basal rate and 155.6mg via boluses " which is 9.8mg per hour average. Will increase basal rate to 6mg/hr and continue bolus dose at 3.5mg but change to q15 min. With this dosing, hope would be that with one bolus / hr, he is able to match what he has used over past 24hrs. Eventually I plan to increase basal dose so that pain can be controlled with minimal bolus doses.    Patient / home care to call Friday with total use x 24 hrs and how well pain controlled or not.   - continue dexamethasone with taper of 4mg x 6 days and then 2mg x 6 days then stop.     Trial MiraLAX to see if has more output from the ostomy.    At follow-up visit we will discuss advance care planning and CODE STATUS.          Again, thank you for allowing me to participate in the care of your patient.      Sincerely,    Lydia Beckham MD

## 2018-11-27 NOTE — PROGRESS NOTES
This is a recent snapshot of the patient's [facility] medical record.  For current drug dose and complete information and questions, call [phone] or In Basket pool, [facility] infusion ([id number])  CSN Number:  [id number]

## 2018-11-27 NOTE — MR AVS SNAPSHOT
After Visit Summary   11/27/2018    Sebas Lopez    MRN: [id number]           Patient Information     Date Of Birth          1965        Visit Information        Provider Department      11/27/2018 2:00 PM Lydia Beckham MD [facility]        Today's Diagnoses     Cancer of sigmoid colon (H)    -  1    Peritoneal carcinomatosis (H)        Nausea and vomiting, intractability of vomiting not specified, unspecified vomiting type        Insomnia due to medical condition        Colon adenocarcinoma (H)        Attention to G-tube (H)        Neoplasm related pain           Follow-ups after your visit        Your next 10 appointments already scheduled     Dec 06, 2018 12:00 PM CST   (Arrive by 11:45 AM)   Return Visit with Albert Long MD   [facility] ([facility])    [address] [place] [address]  [address] [place] [address] [phone]              Who to contact     If you have questions or need follow up information about today's clinic visit or your schedule please contact [facility] directly at [phone].  Normal or non-critical lab and imaging results will be communicated to you by [employer], letter or phone within 4 business days after the clinic has received the results. If you do not hear from us within 7 days, please contact the clinic through [employer] or phone. If you have a critical or abnormal lab result, we will notify you by phone as soon as possible.  Submit refill requests through [employer] or call your pharmacy and they will forward the refill request to us. Please allow 3 business days for your refill to be completed.          Additional Information About Your Visit        MyChart Information     [employer] gives you secure access to your electronic health record. If you see a primary care provider, you can also send messages to your care team and make appointments. If you have questions,  "please call your primary care clinic.  If you do not have a primary care provider, please call [phone] and they will assist you.        Care EveryWhere ID     This is your Care EveryWhere ID. This could be used by other organizations to access your [facility] medical records  [id number]        Your Vitals Were     Pulse Temperature Respirations Height Pulse Oximetry BMI (Body Mass Index)    119 99.1  F (37.3  C) (Tympanic) 16 1.753 m (5' 9\") 97% 21.73 kg/m2       Blood Pressure from Last 3 Encounters:   11/27/18 122/75   11/13/18 117/74   11/05/18 109/67    Weight from Last 3 Encounters:   11/27/18 66.7 kg (147 lb 2 oz)   11/13/18 67.6 kg (149 lb)   11/05/18 67.3 kg (148 lb 4.8 oz)              Today, you had the following     No orders found for display         Today's Medication Changes          These changes are accurate as of 11/27/18  8:14 PM.  If you have any questions, ask your nurse or doctor.               These medicines have changed or have updated prescriptions.        Dose/Directions    prochlorperazine 10 MG tablet   Commonly known as:  COMPAZINE   This may have changed:    - how to take this  - reasons to take this   Used for:  Peritoneal carcinomatosis (H), Nausea and vomiting, intractability of vomiting not specified, unspecified vomiting type   Changed by:  Lydia Beckham MD        Dose:  10 mg   Take 1 tablet (10 mg) by mouth every 6 hours as needed for nausea   Quantity:  90 tablet   Refills:  1         Stop taking these medicines if you haven't already. Please contact your care team if you have questions.     fentaNYL 100 mcg/hr 72 hr patch   Commonly known as:  DURAGESIC   Stopped by:  Lydia Beckham MD                Where to get your medicines      These medications were sent to [facility] - [address] [address]     Phone:  [phone]     prochlorperazine 10 MG tablet         Some of " these will need a paper prescription and others can be bought over the counter.  Ask your nurse if you have questions.     Bring a paper prescription for each of these medications     LORazepam 0.5 MG tablet    order for DME                Primary Care Provider Office Phone # Fax #    Jaguar Becker [phone]       [facility] [address]  [facility] [address]        Equal Access to Services     [facility]: Hadii aad ku hadasho Soomaali, waaxda luqadaha, qaybta kaalmada adeegyada, waxay idiin hayaan adeeg kharash la'aan ah. So [facility] [phone].    ATENCIÓN: Si habla español, tiene a cid disposición servicios gratuitos de asistencia lingüística. Llame al [phone].    We comply with applicable federal civil rights laws and [place] laws. We do not discriminate on the basis of race, color, national origin, age, disability, sex, sexual orientation, or gender identity.            Thank you!     Thank you for choosing [facility]  for your care. Our goal is always to provide you with excellent care. Hearing back from our patients is one way we can continue to improve our services. Please take a few minutes to complete the written survey that you may receive in the mail after your visit with us. Thank you!             Your Updated Medication List - Protect others around you: Learn how to safely use, store and throw away your medicines at www.disposemymeds.org.          This list is accurate as of 11/27/18  8:14 PM.  Always use your most recent med list.                   Brand Name Dispense Instructions for use Diagnosis    dexamethasone 2 MG tablet    DECADRON          * hydromorphone HCl 500 MG/50ML Soln     25 mL    Dilute 250mg in 250ml NS  0.9mg IV every 10 mins as needed  Dispense 1 bag (250mg = 25ml)    Neoplasm related pain       * hydromorphone HCl 500 MG/50ML Soln     30 mL    Dilute 300mg in 300ml NS  1.2mg IV every 10 mins as needed  Dispense 1 bag (300mg =  30ml = 7 day supply)    Neoplasm related pain       * hydromorphone HCl 500 MG/50ML Soln     30 mL    Dilute 300mg in 300ml NS  1.2mg IV every 10 mins as needed  Dispense 1 bag (300mg = 30ml = 7 day supply)    Neoplasm related pain       * hydromorphone HCl 500 MG/50ML Soln   Start taking on:  12/3/2018     30 mL    Dilute 300mg in 300ml NS  1.2mg IV every 10 mins as needed  Dispense 1 bag (300mg = 30ml = 7 day supply)    Neoplasm related pain       * hydromorphone HCl 500 MG/50ML Soln   Start taking on:  12/10/2018     30 mL    Dilute 300mg in 300ml NS  1.2mg IV every 10 mins as needed  Dispense 1 bag (300mg = 30ml = 7 day supply)    Neoplasm related pain       * hydromorphone HCl 500 MG/50ML Soln   Start taking on:  12/17/2018     30 mL    Dilute 300mg in 300ml NS  1.2mg IV every 10 mins as needed  Dispense 1 bag (300mg = 30ml = 7 day supply)    Neoplasm related pain       * hydromorphone HCl 500 MG/50ML Soln   Start taking on:  12/24/2018     30 mL    Dilute 300mg in 300ml NS  1.2mg IV every 10 mins as needed  Dispense 1 bag (300mg = 30ml = 7 day supply)    Neoplasm related pain       loperamide 2 MG capsule    IMODIUM     Take 2 mg by mouth as needed for diarrhea        LORazepam 0.5 MG tablet    ATIVAN    30 tablet    Take 1 tablet (0.5 mg) by mouth every 4 hours as needed (Anxiety, Nausea/Vomiting or Sleep)    Peritoneal carcinomatosis (H), Cancer of sigmoid colon (H), Nausea and vomiting, intractability of vomiting not specified, unspecified vomiting type, Insomnia due to medical condition       naloxone 4 MG/0.1ML nasal spray    NARCAN    0.2 mL    Spray 1 spray (4 mg) into one nostril alternating nostrils as needed for opioid reversal every 2-3 minutes until assistance arrives    Cancer of sigmoid colon (H), Long term current use of opiate analgesic       ondansetron 8 MG ODT tab    ZOFRAN-ODT    60 tablet    Take 1 tablet (8 mg) by mouth every 8 hours as needed for nausea    Nausea       order for DME      1 Device    Equipment being ordered: home suction machine with tubing for connection to venting G-tube Treatment Diagnosis: colon adenocarcinoma    Colon adenocarcinoma (H), Small bowel obstruction (H)       order for DME     1 Units    Equipment being ordered: intermittent suction equipment for venting gtube    Colon adenocarcinoma (H), Peritoneal carcinomatosis (H), Attention to G-tube (H)       parenteral nutrition - PTA/DISCHARGE ORDER      Inject into the vein daily [facility]        prochlorperazine 10 MG tablet    COMPAZINE    90 tablet    Take 1 tablet (10 mg) by mouth every 6 hours as needed for nausea    Peritoneal carcinomatosis (H), Nausea and vomiting, intractability of vomiting not specified, unspecified vomiting type       sodium chloride 0.9% Soln BOLUS     1000 mL    Inject 1,000 mLs into the vein daily as needed For hydration.        * Notice:  This list has 7 medication(s) that are the same as other medications prescribed for you. Read the directions carefully, and ask your doctor or other care provider to review them with you.

## 2018-11-27 NOTE — PROGRESS NOTES
"Palliative Care Outpatient Clinic Progress Note    Patient Name:  Sebas Lopez  Primary Provider:  Jaguar Becker    Chief Complaint:   metastatic stage IV colon cancer  Abdominal pain due to above   Fatigue     Summary: 53-year-old male with stage IV metastatic colon cancer with peritoneal carcinomatosis and obstructing sigmoid colon mass, receiving TPN, venting of gtube when needed, ostomy in place.  The last scan on October 4, 2018 showed further disease progression and at that time, Dr. Long discussed use of Regorafenib, although he was worried that the side effects would be too great and that this was not a good option.  He did recommend considering no further anticancer treatment and focusing only on symptom management with hospice.  Recent hospitalization from 10/19-10/23 for PICC line infection.    Last Palliative care appointment: 11/13     Reviewed: Reviewed    Interim History:  Sebas presents for follow-up on symptom management.    Pain currently is 3-4 in abdomen and feels like a throbbing or ache and always there. Not butler. At this level, too strong to be able to sleep. Pain this morning was really bad and wasn't able to get comfortable. He rated the pain at a 6 this morning with home care nurse.  He sleeps for at most 2 hours at a stretch before needing to catch up on his pain control.  Pain does seem to respond when he pushes her bolus dose although needing it several times an hour.  When pain is really severe he will have nausea as he did this morning. Took compazine and the nausea slowly resolved as did the pain.  Only needing Compazine occasionally for nausea.    His home care nurse recommended taking Lorazepam before bed at night as she thought he might have trouble sleeping on the steroids.  She also thought it might help him \"mentally\" meaning dealing with his situation better. Started it at night about 4 days ago at home care nurse suggestion. Helps with pain and relaxation. "     Started dexamethasone on Friday and was taking it at night but just told today that should take it in the morning.     Medications:   - home hydromorphone PCA 3.5mg basal and 3.5mg boluses q10 min PRN  Last 24 hours - 237mg total with 81mg via basal rate and 155.6mg via boluses which is 9.8mg per hour average  Dexamethasone 4 mg x 6 days then decrease to 2 mg x 6 days then stop    Venting G-tube does not need to be vented very often and for the most part he always leaves it closed.  When he has increased pain or nausea he does open up the G-tube.    Decrease output from the ostomy over the past week but still with some output.  He is wondering if the decreased ostomy output is due to the increased pain medications.    Review of Systems:  ROS: 10 point ROS neg other than the symptoms noted above in the HPI     Social History:    Sig other, lawanda accompanied Brain to visit today.         Allergies   Allergen Reactions     Liquid Adhesive Rash     Dermabond, rash with pustules, occurred with abdominal surgery and port removal      Current Outpatient Prescriptions   Medication Sig Dispense Refill     dexamethasone (DECADRON) 2 MG tablet        hydromorphone HCl 500 MG/50ML SOLN Dilute 250mg in 250ml NS    0.9mg IV every 10 mins as needed    Dispense 1 bag (250mg = 25ml) 25 mL 0     LORazepam (ATIVAN) 0.5 MG tablet Take 1 tablet (0.5 mg) by mouth every 4 hours as needed (Anxiety, Nausea/Vomiting or Sleep) 30 tablet 5     ondansetron (ZOFRAN-ODT) 8 MG ODT tab Take 1 tablet (8 mg) by mouth every 8 hours as needed for nausea 60 tablet 5     parenteral nutrition - PTA/DISCHARGE ORDER Inject into the vein daily [facility]       prochlorperazine (COMPAZINE) 10 MG tablet 10 mg every 6 hours as needed        fentaNYL (DURAGESIC) 100 mcg/hr 72 hr patch Place 4 patches onto the skin every 72 hours remove old patches. (Patient not taking: Reported on 11/27/2018) 40 patch 0     [START ON 12/24/2018] hydromorphone HCl 500 MG/50ML SOLN  Dilute 300mg in 300ml NS    1.2mg IV every 10 mins as needed    Dispense 1 bag (300mg = 30ml = 7 day supply) (Patient not taking: Reported on 11/27/2018) 30 mL 0     [START ON 12/17/2018] hydromorphone HCl 500 MG/50ML SOLN Dilute 300mg in 300ml NS    1.2mg IV every 10 mins as needed    Dispense 1 bag (300mg = 30ml = 7 day supply) (Patient not taking: Reported on 11/27/2018) 30 mL 0     [START ON 12/10/2018] hydromorphone HCl 500 MG/50ML SOLN Dilute 300mg in 300ml NS    1.2mg IV every 10 mins as needed    Dispense 1 bag (300mg = 30ml = 7 day supply) (Patient not taking: Reported on 11/27/2018) 30 mL 0     [START ON 12/3/2018] hydromorphone HCl 500 MG/50ML SOLN Dilute 300mg in 300ml NS    1.2mg IV every 10 mins as needed    Dispense 1 bag (300mg = 30ml = 7 day supply) (Patient not taking: Reported on 11/27/2018) 30 mL 0     hydromorphone HCl 500 MG/50ML SOLN Dilute 300mg in 300ml NS    1.2mg IV every 10 mins as needed    Dispense 1 bag (300mg = 30ml = 7 day supply) (Patient not taking: Reported on 11/27/2018) 30 mL 0     hydromorphone HCl 500 MG/50ML SOLN Dilute 300mg in 300ml NS    1.2mg IV every 10 mins as needed    Dispense 1 bag (300mg = 30ml = 7 day supply) (Patient not taking: Reported on 11/27/2018) 30 mL 0     loperamide (IMODIUM) 2 MG capsule Take 2 mg by mouth as needed for diarrhea       naloxone (NARCAN) nasal spray Spray 1 spray (4 mg) into one nostril alternating nostrils as needed for opioid reversal every 2-3 minutes until assistance arrives (Patient not taking: Reported on 11/13/2018) 0.2 mL 0     order for DME Equipment being ordered: intermittent suction equipment for venting gtube (Patient not taking: Reported on 11/27/2018) 1 Units 0     order for DME Equipment being ordered: home suction machine with tubing for connection to venting G-tube  Treatment Diagnosis: colon adenocarcinoma (Patient not taking: Reported on 11/27/2018) 1 Device 0     sodium chloride 0.9% SOLN BOLUS Inject 1,000 mLs into  the vein daily as needed For hydration. 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     Ulcerative colitis (H)      Past Surgical History:   Procedure Laterality Date     COLONOSCOPY  2017     COLONOSCOPY N/A 11/30/2017    Procedure: COLONOSCOPY;  Colonoscopy;  Surgeon: Rob Dudley MD;  Location: [facility]     COLONOSCOPY N/A 2/6/2018    Procedure: COMBINED COLONOSCOPY, STENT PLACEMENT;  COMBINED COLONOSCOPY with Colonic Stent Placement;  Surgeon: Guru Lionel Mar MD;  Location: [facility]     COLONOSCOPY N/A 3/19/2018    Procedure: COMBINED COLONOSCOPY, STENT PLACEMENT;  flexible sigmoidoscopy with stent placement and dilation;  Surgeon: Alexis Rios MD;  Location: [facility]     COLONOSCOPY N/A 7/5/2018    Procedure: COMBINED COLONOSCOPY, STENT PLACEMENT;  flexible sigmoidoscopy with colonic stent placement ;  Surgeon: Alexis Rios MD;  Location: [facility]     GI SURGERY  07/10/2017    Extensive lysis of adhesions, small bowel resection with end ileostomy.      HERNIA REPAIR       INSERT PORT VASCULAR ACCESS Right 8/10/2017    Procedure: INSERT PORT VASCULAR ACCESS;  Single Lumen Right Chest Power Port;  Surgeon: Malena Andrew PA-C;  [facility]     LAPAROSCOPY DIAGNOSTIC (GENERAL) N/A 12/6/2017    Procedure: LAPAROSCOPY DIAGNOSTIC (GENERAL);  Diagnostic Laparoscopy, Exploratory Laparotomy Anesthesia Block ;  Surgeon: Rob Dudley MD;  Location: [facility]     LAPAROTOMY EXPLORATORY N/A 12/6/2017    Procedure: LAPAROTOMY EXPLORATORY;;  Surgeon: Rob Dudley MD;  Location: [facility]     ORTHOPEDIC SURGERY Left     HIP ARTHROPLASTY     PICC INSERTION Right 02/23/2018  "   5Fr DL BioFlo PICC, 44cm (3cm external) in the R basilic vein w/ tip in the SVC RA junction           /75  Pulse 119  Temp 99.1  F (37.3  C) (Tympanic)  Resp 16  Ht 1.753 m (5' 9\")  Wt 66.7 kg (147 lb 2 oz)  SpO2 97%  BMI 21.73 kg/m2  General: Well-groomed, appears older than stated age, alert, in no acute distress  Eyes: EOMI, sclera clear  HEENT: NC/AT; mucous membranes moist  Lungs: No increased work of breathing, speaking full sentences   Abdomen: Firm, nontender to palpation, no rebound or guarding, no increased erythema or warmth surrounding ostomy or venting G-tube  Neuro: A&O x 3; CN II-XII grossly intact; gait normal  Neuropsych: Alert, good eye contact, engaged, affect full, pleasant, sensorium intact      Key Data Reviewed:  reviewed      Impression:     Sebas is a 53 yr old male with stage IV metastatic colon cancer with peritoneal carcinomatosis and obstructing sigmoid colon mass,, venting gtube, on TPN, still with ostomy output but unclear oral absorption. Increasing abdominal pain over past weeks.     Recommendations & Counseling:      Pain management in patient with stage IV metastatic colon cancer with peritoneal carcinomatosis and obstructing sigmoid colon mass, venting gtube, on TPN, still with ostomy output but unclear oral absorption. Pain much better controlled this week than last week when transition was made off fentanyl patches and on to PCA hydromorphone continuous and boluses but still needing very frequent bolus dosing. Currently home hydromorphone PCA 3.5mg basal and 3.5mg boluses q10 min PRN. Last 24 hours - 237mg total with 81mg via basal rate and 155.6mg via boluses which is 9.8mg per hour average. Will increase basal rate to 6mg/hr and continue bolus dose at 3.5mg but change to q15 min. With this dosing, hope would be that with one bolus / hr, he is able to match what he has used over past 24hrs. Eventually I plan to increase basal dose so that pain can be controlled " with minimal bolus doses.    Patient / home care to call Friday with total use x 24 hrs and how well pain controlled or not.   - continue dexamethasone with taper of 4mg x 6 days and then 2mg x 6 days then stop.     Trial MiraLAX to see if has more output from the ostomy.    At follow-up visit we will discuss advance care planning and CODE STATUS.      Thank you for involving us in the patient's care.  35 minutes face time over half spent counseling.  Lydia Beckham MD / Palliative Medicine / Pager [phone] / After-Hours Answering Service [phone] / Main Palliative Clinic - [phone] / [facility] Inpatient Team Consult Pager [phone] (answered 8am-430pm M-F)

## 2018-11-27 NOTE — TELEPHONE ENCOUNTER
Received VM from MICHAEL Marlow with [facility]. She tells me that patient plans to attend apt with Dr. Beckham this afternoon. Patient is still struggling with pain control. Did respond to increased dilaudid. They are going to change dexamethasone to AM instead of PM and ask if dose could be increased? Also note that bowels are slowing down. Would like to try gabapentin.     Ele's phone #[phone]

## 2018-11-28 NOTE — PROGRESS NOTES
This is a recent snapshot of the patient's [facility] medical record.  For current drug dose and complete information and questions, call [phone] or In [facility] pool, fv home infusion ([id number])  CSN Number:  [id number]

## 2018-11-29 NOTE — PROGRESS NOTES
This is a recent snapshot of the patient's [facility] medical record.  For current drug dose and complete information and questions, call [phone] or In [facility] pool, fv home infusion ([id number])  CSN Number:  [id number]

## 2018-11-30 NOTE — PROGRESS NOTES
This is a recent snapshot of the patient's [facility] medical record.  For current drug dose and complete information and questions, call [phone] or In Basket pool, fv home infusion ([id number])  CSN Number:  [id number]

## 2018-12-03 NOTE — PROGRESS NOTES
This is a recent snapshot of the patient's [facility] medical record.  For current drug dose and complete information and questions, call [phone] or In Basket pool, fv home infusion ([id number])  CSN Number:  [id number]

## 2018-12-04 NOTE — PROGRESS NOTES
This is a recent snapshot of the patient's [facility] medical record.  For current drug dose and complete information and questions, call [phone] or In Basket pool, fv home infusion ([id number])  CSN Number:  [id number]

## 2018-12-06 NOTE — LETTER
12/6/2018       RE: Sebas Lopez  [address] [place] [address]     Dear Colleague,    Thank you for referring your patient, Sebas Lopez, to the [facility]. Please see a copy of my visit note below.    Oncology follow up visit:  Date on this visit: 12/6/2018         CC  sigmoid adenocarcinoma with peritoneal carcinomatosis- MSI- Intact KRAS wild type    Primary Physician: Waylon Han     History Of Present Illness:     Please see previous note for details. I have copied and updated from prior notes.   Sebas is a 53-year-old male who has a history of ulcerative colitis diagnosed more than 20 years ago, but he has not had medical management for that.  He was doing well, but in 05/2017 he presented with a few weeks of abdominal pain.  At that time, his imaging showed that he had a sigmoid wall thickening with adjacent mesenteric lymphadenopathy and free fluid near the abdominal wall mesh from his prior hernia surgery.  He subsequently underwent a colonoscopy which was incomplete and showed an obstructing sigmoid colon mass.  The biopsy from the sigmoid mass showed sigmoid adenocarcinoma.  He then developed obstructive symptoms and underwent exploratory laparotomy on 07/10/2017.  During that he was found to have diffuse peritoneal carcinomatosis.  Extensive lysis of adhesions was performed and a small bowel resection was performed with end ileostomy.  The biopsies from the multiple large peritoneal metastasis also were positive for metastatic adenocarcinoma.      He started palliative FOLFOX on 8/11/17. C#6 given on 10/26/17  After 6 cycles, he has stable disease    On 12/6/17, he had Diagnostic laparoscopy and Exploratory laparotomy, but HIPEC was not performed as Peritoneal cancer index was 26 with diffuse involvement of the small bowel as well as the small bowel mesentery.     He then presented to ED on 1/26 with abdominal pain and associated nausea. CT A/P on 1/26  with 5.2 x 4.5 x 3.6 cm proximal sigmoid mass causnig colonic obstriction with singificant distention of cecum (7cm in diameter), ascending colon and proximal transverse colon. No pneumatosis or pneumoperitoneum. Also small volume of ascites with associated findings concerning for peritoneal carcinomatosis, increased from prior studies. Dr. Dudley was consulted and recommended no surgical intervention.    He was then seen in clinic on 1/29/2018 as well as yesterday because for the last few days he was unable to tolerate solid foods and he was getting abdominal cramping and some nausea. He also noticed that his ostomy output decreased. He was given IV fluids as well as antibiotics and yesterday he was started on OxyContin 10 mg twice a day along with p.r.n. oxycodone. Of note a few weeks ago he was started on short acting octreotide to decrease the ostomy output but he stopped taking it about one week ago and few days prior to that he noticed worsening of the abdominal cramping.      Repeat CT scan showed worsening peritoneal carcinomatosis and he is getting more symptomatic in terms of worsening abdominal pain and difficulty eating because of worsening abdominal cramping. He was admitted to the hospital with worsening colon distention and colon stent was placed which improved his symptoms.     He was recently hospitalized from 2/20-2/25/18 with presumed infectious colitis and bacteremia. His port was removed. He was discharged home on IV vancomycin via a PICC line which he completed on 3/7/2018.     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 He was hospitalized with a bowel obstruction from 3/18-3/20/18 and a colonic stent was placed on 3/19/18.  C#8 3/22/18 FOLFOX  C#9 4/5/18 FOLFOX      Repeat CT shows slightly improved disease and less ascites now.   Because of progressive fatigue, we stopped the 5-FU bolus and leucovorin and continued with the 5-FU infusion and  oxaliplatin.     C#10 5/4/18  C#11 5/18/18  C#13 5/31/18  C#13 6/15/18    Repeat CT scan showed worsening peritoneal disease. There is also increased fluid in the proximal control on as well as post stent fluid in the rectum. This could be consistent with colitis  I gave him levofloxacin/flagyl and plan was to switch therapy to irinotecan and panitumumab though was hospitalized on 7/3 for abdominal pain and bloating as well as rectal bleeding. Found to have colitis with increased fluid dilation of the colon. GI did a flex sig and placed a colon stent for the third time. Other stents were found to have ingrowth of tumor.   Again admitted on 7/24 with SBO and PAULINA. Venting G tube placed which helped with N/V.      He initiated Irinotecan and Panitumumab on 8/17/18.  Cycle #2 was delayed because of dehydration and Acute kidney injury and hyponatremia and weight loss.  Cycle #2 on 9/7/2018  Cycle #3 on 9/21/2018    Repeat CT scan done on 10/4/18 shows progression of the disease with worsening peritoneal carcinomatosis as well as increase in the size of the primary tumor  as well as enlargement of mesenteric and retroperitoneal lymph nodes. Multiple enlarged right greater than left cardiophrenic lymph nodes, increased from prior.    I discussed options with him including Regorafenib versus Lonsurf although because of chronic obstruction and poor absorption I told him that it would be unlikely that he would benefit from these treatments.  He opted not to start those treatments.    He has been following with palliative care and he comes in today and tells me that overall his pain is under better control with Dilaudid PCA.  He gets some nausea with the pain and takes Compazine.  Overall his energy has been fair although low.  He eats a little and uses her venting G-tube and he thinks half of it comes out of the venting G-tube.  Ostomy is working well.  Denies bleeding.  No trouble breathing.  No neuropathy.  No new  swellings.  He continues to be on TPN.    ECOG 2    ROS:  A comprehensive ROS was otherwise neg      I reviewed her history in Epic as below      Past Medical/Surgical History:  Past Medical History:   Diagnosis Date     Adenocarcinoma of sigmoid colon (H)     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     Ulcerative colitis (H)      Past Surgical History:   Procedure Laterality Date     COLONOSCOPY  2017     COLONOSCOPY N/A 11/30/2017    Procedure: COLONOSCOPY;  Colonoscopy;  Surgeon: Rob Dudley MD;  Location: [facility]     COLONOSCOPY N/A 2/6/2018    Procedure: COMBINED COLONOSCOPY, STENT PLACEMENT;  COMBINED COLONOSCOPY with Colonic Stent Placement;  Surgeon: Guru Lionel Mar MD;  Location: [facility]     COLONOSCOPY N/A 3/19/2018    Procedure: COMBINED COLONOSCOPY, STENT PLACEMENT;  flexible sigmoidoscopy with stent placement and dilation;  Surgeon: Alexis Rios MD;  Location: [facility]     COLONOSCOPY N/A 7/5/2018    Procedure: COMBINED COLONOSCOPY, STENT PLACEMENT;  flexible sigmoidoscopy with colonic stent placement ;  Surgeon: Alexis Rios MD;  Location: [facility]     GI SURGERY  07/10/2017    Extensive lysis of adhesions, small bowel resection with end ileostomy.      HERNIA REPAIR       INSERT PORT VASCULAR ACCESS Right 8/10/2017    Procedure: INSERT PORT VASCULAR ACCESS;  Single Lumen Right Chest Power Port;  Surgeon: Malena Andrew PA-C;  Location: [facility]     LAPAROSCOPY DIAGNOSTIC (GENERAL) N/A 12/6/2017    Procedure: LAPAROSCOPY DIAGNOSTIC (GENERAL);  Diagnostic Laparoscopy, Exploratory Laparotomy Anesthesia Block ;  Surgeon: Rob Dudley MD;  Location: [facility]     LAPAROTOMY EXPLORATORY N/A 12/6/2017    Procedure: LAPAROTOMY EXPLORATORY;;  Surgeon:  Rob Dudley MD;  Location: [facility]     ORTHOPEDIC SURGERY Left     HIP ARTHROPLASTY     PICC INSERTION Right 02/23/2018    5Fr DL BioFlo PICC, 44cm (3cm external) in the R basilic vein w/ tip in the SVC RA junction      Ulcerative colitis.  Left hip replacement.       Cancer History:   As above    Allergies:  Allergies as of 12/06/2018 - Julius as Reviewed 12/06/2018   Allergen Reaction Noted     Liquid adhesive Rash 03/01/2018     Current Medications:  Current Outpatient Prescriptions   Medication Sig Dispense Refill     dexamethasone (DECADRON) 2 MG tablet        hydromorphone HCl 500 MG/50ML SOLN Dilute 250mg in 250ml NS    0.9mg IV every 10 mins as needed    Dispense 1 bag (250mg = 25ml) 25 mL 0     loperamide (IMODIUM) 2 MG capsule Take 2 mg by mouth as needed for diarrhea       LORazepam (ATIVAN) 0.5 MG tablet Take 1 tablet (0.5 mg) by mouth every 4 hours as needed (Anxiety, Nausea/Vomiting or Sleep) 30 tablet 5     ondansetron (ZOFRAN-ODT) 8 MG ODT tab Take 1 tablet (8 mg) by mouth every 8 hours as needed for nausea 60 tablet 5     order for DME Equipment being ordered: intermittent suction equipment for venting gtube 1 Units 0     parenteral nutrition - PTA/DISCHARGE ORDER Inject into the vein daily [facility]       prochlorperazine (COMPAZINE) 10 MG tablet Take 1 tablet (10 mg) by mouth every 6 hours as needed for nausea 90 tablet 1     sodium chloride 0.9% SOLN BOLUS Inject 1,000 mLs into the vein daily as needed For hydration. 1000 mL 0     [START ON 12/24/2018] hydromorphone HCl 500 MG/50ML SOLN Dilute 300mg in 300ml NS    1.2mg IV every 10 mins as needed    Dispense 1 bag (300mg = 30ml = 7 day supply) (Patient not taking: Reported on 11/27/2018) 30 mL 0     [START ON 12/17/2018] hydromorphone HCl 500 MG/50ML SOLN Dilute 300mg in 300ml NS    1.2mg IV every 10 mins as needed    Dispense 1 bag (300mg = 30ml = 7 day supply) (Patient not taking: Reported on 11/27/2018) 30 mL 0     [START ON 12/10/2018]  hydromorphone HCl 500 MG/50ML SOLN Dilute 300mg in 300ml NS    1.2mg IV every 10 mins as needed    Dispense 1 bag (300mg = 30ml = 7 day supply) (Patient not taking: Reported on 11/27/2018) 30 mL 0     hydromorphone HCl 500 MG/50ML SOLN Dilute 300mg in 300ml NS    1.2mg IV every 10 mins as needed    Dispense 1 bag (300mg = 30ml = 7 day supply) (Patient not taking: Reported on 11/27/2018) 30 mL 0     hydromorphone HCl 500 MG/50ML SOLN Dilute 300mg in 300ml NS    1.2mg IV every 10 mins as needed    Dispense 1 bag (300mg = 30ml = 7 day supply) (Patient not taking: Reported on 11/27/2018) 30 mL 0     hydromorphone HCl 500 MG/50ML SOLN Dilute 300mg in 300ml NS    1.2mg IV every 10 mins as needed    Dispense 1 bag (300mg = 30ml = 7 day supply) (Patient not taking: Reported on 11/27/2018) 30 mL 0     naloxone (NARCAN) nasal spray Spray 1 spray (4 mg) into one nostril alternating nostrils as needed for opioid reversal every 2-3 minutes until assistance arrives (Patient not taking: Reported on 11/13/2018) 0.2 mL 0     order for DME Equipment being ordered: home suction machine with tubing for connection to venting G-tube  Treatment Diagnosis: colon adenocarcinoma (Patient not taking: Reported on 12/6/2018) 1 Device 0      Family History:  Family History   Problem Relation Age of Onset     Hypertension Father      Diabetes Father       No family history of cancer.  He does not have any kids.       Social History:  Social History     Social History     Marital status: Single     Spouse name: N/A     Number of children: N/A     Years of education: N/A     Occupational History     Not on file.     Social History Main Topics     Smoking status: Never Smoker     Smokeless tobacco: Never Used     Alcohol use 3.0 oz/week     5 Cans of beer per week      Comment: none for last 4 months     Drug use: No     Sexual activity: Not on file     Other Topics Concern     Not on file     Social History Narrative   He does not smoke and does  not drink.  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  He lives with his girlfriend, Bessie.       Physical Exam:  /73  Pulse 96  Temp 98.5  F (36.9  C) (Oral)  Wt 68.6 kg (151 lb 4.8 oz)  SpO2 98%  BMI 22.34 kg/m2  CONSTITUTIONAL: No apparent distress  EYES: PERRLA, that is pallor but no jaundice  ENT/MOUTH: Ears unremarkable. No oral lesions  CVS: s1s2 normal  RESPIRATORY: Chest is clear  GI: Abdomen is has well-healed surgical scars.  Ostomy site and venting G-tube site is clean.  Abdomen is very firm but it is nontender  NEURO: Alert and oriented ×3  INTEGUMENT: no concerning skin rashes   LYMPHATIC: no palpable lymphadenopathy  MUSCULOSKELETAL: Unremarkable. No bony tenderness.   EXTREMITIES: no pedal edema  PSYCH: Mentation, mood and affect are appropriate              Laboratory/Imaging Studies  Results for KARINA MINER (MRN [id number]) as of 12/6/2018 12:04   Ref. Range 11/5/2018 11:25 11/20/2018 00:00 11/20/2018 09:45 11/20/2018 09:45   Sodium Latest Units: mmol/L 135   135   Potassium Latest Units: mmol/L 4.9   4.6   Chloride Latest Units: mmol/L 101   102   Carbon Dioxide Latest Units: mmol/L 27   22   Urea Nitrogen Latest Units: mg/dL 21   18   Creatinine Latest Units: mg/dL 0.62 (L)   0.74   GFR Estimate Latest Units: ml/min/1.73m2 >90   >60   GFR Estimate If Black Latest Units: ml/min/1.73m2 >90   >60   Calcium Latest Units: mg/dL 9.0   9.1   Anion Gap Latest Units: mmol/L 7   11   Magnesium Latest Units: mg/dL   1.7    Phosphorus Latest Units: mg/dL   4.3    Albumin Latest Ref Range: 3.5 - 6.2 g/dL 2.0 (L)   2.6 (L)   Prealbumin Latest Ref Range: 16.0 - 38.0 mg/dL   7.2 (L)    Protein Total Latest Ref Range: 6.0 - 8.0 g/dL 8.6   8.1 (H)   Bilirubin Total Latest Units: mg/dL 0.3  0.5 0.5   Alkaline Phosphatase Latest Units: U/L 146   132   ALT Latest Units: U/L 17   14   AST Latest Units: U/L 30   30   A/G Ratio Latest Ref Range: 1.0 - 2.0     0.5 (L)   Bilirubin Direct  Latest Units: mg/dL   0.3    Bilirubin Indirect Latest Units: mg/dL   0.2    BUN/Creatinine Ratio Latest Ref Range: 10 - 20     24 (H)   Ferritin Latest Ref Range: 26 - 388 ng/mL 457 (H)      Globulin Latest Ref Range: 2.0 - 3.7 g/dL    5.5 (H)   Iron Latest Ref Range: 35 - 180 ug/dL 18 (L)      Iron Binding Cap Latest Ref Range: 240 - 430 ug/dL 149 (L)      Iron Saturation Index Latest Ref Range: 15 - 46 % 12 (L)      Triglycerides Latest Units: mg/dL   75    Glucose Latest Ref Range: 65 - 100 mg/dL 107 (H)   107 (H)   WBC Latest Units: 10^9/L 8.5  8.2    Hemoglobin Latest Units: g/dL 8.1 (L)  8.0    Hematocrit Latest Units: % 26.3 (L)  25.0    Platelet Count Latest Ref Range: 150 - 450 10e9/L 414      Platelet Count Latest Ref Range: 140 - 440 10^9/L   508 (H)    MPV Latest Ref Range: 6.5 - 11.0    11.5 (H)    RBC Count Latest Ref Range: 4.30 - 5.90 10^12/L 3.20 (L)  3.11 (L)    MCV Latest Ref Range: 37.0 - 53.0 fl 82  8.0 (L)    MCH Latest Ref Range: 26.0 - 34.0 pg 25.3 (L)  25.7 (L)    MCHC Latest Units: g/dL 30.8 (L)  32.0    RDW Latest Ref Range: 11.5 - 15.5 % 18.2 (H)  17.7 (H)    Diff Method Unknown Automated Method      % Neutrophils Latest Units: % 64.4  67.2    % Lymphocytes Latest Units: % 20.8  15.8    % Monocytes Latest Units: % 11.7  13.3    % Eosinophils Latest Units: % 2.2  3.1    % Basophils Latest Units: % 0.5  0.6    % Immature Granulocytes Latest Units: % 0.4      Nucleated RBCs Latest Ref Range: 0 /100 0      Absolute Neutrophil Unknown 5.5  5.5    Absolute Lymphocytes Unknown 1.8  1.3    Absolute Monocytes Latest Ref Range: <0.9  1.0  1.1 (H)    Absolute Eosinophils Unknown 0.2  0.3    Absolute Basophils Unknown 0.0  0.1    Abs Immature Granulocytes Latest Ref Range: 0 - 0.4 10e9/L 0.0      Absolute Nucleated RBC Unknown 0.0      % Retic Latest Ref Range: 0.5 - 2.0 % 1.0      Absolute Retic Latest Ref Range: 25 - 95 10e9/L 30.9        EXAMINATION: CT CHEST/ABDOMEN/PELVIS W CONTRAST, 10/4/2018  11:18 AM     TECHNIQUE:  Helical CT images from the thoracic inlet through the  symphysis pubis were obtained  with contrast. Contrast dose: ISOVUE  370 86cc     COMPARISON: 7/24/2018, 7/3/2028, 6/27/2018 and 5/3/2018.     HISTORY: metastatic colon cancer, assess response to treatment; Colon  adenocarcinoma (H); Peritoneal carcinomatosis (H); Peritoneal  carcinomatosis (H)     FINDINGS:     Chest: Coarse calcification in the right lobe gland. Right subclavian  PICC terminates near the atriocaval junction. The heart is normal in  size without pericardial effusion. Coronary artery calcification.  Multiple enlarged right greater than left cardiophrenic lymph nodes,  increased from prior, for example measuring up to 11 mm on series 3,  image 245. No suspicious axillary, hilar, or mediastinal  lymphadenopathy.     Punctate right apical calcified granulomas. New geographic groundglass  opacity in the inferomedial left upper lobe, series 5, image 89 with  tiny subpleural groundglass nodule in the inferomedial right upper  lobe, series 5, image 86. Mild bibasilar fibroatelectasis. No focal  consolidation or pleural effusion.     Abdomen and pelvis: Percutaneous gastrostomy tube in place, balloon  inflated within the gastric antrum. The stomach is now decompressed.  Previously seen small bowel dilatation is improved with persistent  mildly dilated loops of bowel. There is diffuse bowel wall thickening  and enhancement which may be related to serosal implants and reactive  secondary to adjacent carcinomatosis. Postsurgical changes of distal  small bowel resection. Right mid abdominal end ileostomy. Overlapping  stents are present within the rectum and sigmoid colon, similar in  configuration to the prior exam with narrowing of the lumen of the  sigmoid stent along the proximal aspect of overlap. Persistent  opacification with heterogeneous density within the stents.      Redemonstration of extensive confluent peritoneal  carcinomatosis  surrounding the rectosigmoid colon, enveloping multiple bowel loops  and extending throughout the peritoneum with multiple nodular omental  implants and tumor extending along the end ileostomy. The overall  degree of tumor burden in the abdomen and pelvis has increased  compared to recent prior exams of 7/24/2018 and 7/3/2018, for example  soft tissue thickening around the stent extends approximately 7 cm in  transverse dimension as seen on series 3, image 516, measuring  approximately 5.7 cm on the 7/24/2018 exam when measured using similar  technique. The volume of implants elsewhere is also overall increased  compared to prior, for example tumor measures approximately 4.4 cm in  thickness along the midline ventral mesh, series 3, image 49,  measuring approximately 3 cm in a similar location on the 7/24/2018  exam.      Multiple enlarged lymph nodes in the pelvis are also increased  compared to prior, for example multiple perirectal nodes and nodes  along the sigmoid mesentery, for example measuring 1 cm in short axis  dimension on series 3, image 485, compared to 6 mm on the prior exam.  Mildly enlarged inguinal lymph nodes are similar to prior. Enlarged  lymph nodes along the inferior epigastric arteries, also mildly  enlarged, for example series 3 images 538 and 520 on the right.  Multiple mesenteric and retroperitoneal lymph nodes are also slightly  enlarged. Heterogeneous peritoneal implants along the surface of the  liver with mild scalloping and contour nodularity are slightly  increased. Heterogeneous hypodense tissue also extends along the park  hepatis and periportal regions with hypodensity along the medial  aspect of segment 5 and 6 presumably related to peritoneal implants.  No definite new liver lesions.      Unchanged mild dilatation of the common bile duct. The spleen is  stable in appearance. The adrenal glands are unremarkable. No  hydronephrosis. Stable subcentimeter  hypodensities in the anterior mid  right kidney with adjacent likely peritoneal implants. Peritoneal  implants/retroperitoneal lymph nodes along the right inferior pole  have also increased. The bladder is partially distended. Evaluation of  the deep pelvis is mildly limited secondary to beam hardening artifact  from left total hip arthroplasty. Marked flattening of the infrarenal  IVC with adjacent tumor. No significant free intraperitoneal fluid or  air.     Bones and soft tissues: Multiple scattered sclerotic foci, for example  in the medial left clavicle and in the ribs, not significantly  changed. Left-sided rib fractures. Left total hip arthroplasty in  place with stable lucency along the left acetabulum/iliac wing.  Unchanged irregularity along the right scapula. The bones otherwise  appear demineralized with degenerative change.     Persistent and mildly progressed soft tissue tumoral implants along  the ventral midline abdomen extending above and below the umbilicus as  well as along the end ileostomy.         IMPRESSION:   1. Interval percutaneous gastrostomy tube placement with gastric  decompression and improvement in previously seen small bowel  dilatation.   2. Overall evidence of disease progression with increase in extensive  carcinomatosis and peritoneal tumor burden as well as enlargement of  mesenteric and retroperitoneal lymph nodes. Tumor extension along the  liver and subcutaneous soft tissues is also mildly increased. Marked  flattening of the infrarenal IVC with adjacent tumor.   3. Overlapping stents are present within the rectum and sigmoid colon,  similar in configuration to the prior exam with persistent narrowing  of the lumen and intraluminal opacification. Persistent diffuse bowel  wall thickening and hyperemia compatible with ongoing  inflammation/infection.  4. New geographic groundglass opacity in the inferomedial left upper  lobe, and tiny subpleural groundglass nodule in the  inferomedial right  upper lobe may be infectious/inflammatory. Recommend continued  attention on follow-up imaging. Multiple enlarged right greater than  left cardiophrenic lymph nodes, increased from prior.        NGS PANEL COLORECTAL    No mutations were identified in analyzed regions of KRAS, NRAS, BRAF, HRAS, or PIK3CA.       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  The sigmoid carcinoma is obstructing, requiring exploratory laparotomy and end ileostomy along with small bowel resection.    MSI intact and NGS panel does not reveal any identifiable mutations     He has been started on FOLFOX palliative chemotherapy. After 6 cycles, he had stable disease      On 12/6/17, he had Diagnostic laparoscopy and Exploratory laparotomy, but HIPEC was not performed as Peritoneal cancer index was 26 with diffuse involvement of the small bowel as well as the small bowel mesentery.     Repeat CT scan showed worsening peritoneal carcinomatosis and he was getting more symptomatic in terms of worsening abdominal pain and difficulty eating because of worsening abdominal cramping. He was admitted to the hospital with worsening colon distention and colon stent was placed which improved his symptoms.     He was hospitalized from 2/20-2/25/18 with presumed infectious colitis and bacteremia. His port was removed. He was discharged home on IV vancomycin via PICC line. He completed vancomycin on 3/7/2018.    He resumed FOLFOX (C#7) with dose reduced oxaliplatin (70mg/m2) due to previous neuropathy on 3/8/18. He was hospitalized with a bowel obstruction from 3/18-3/20/18 and a colonic stent was placed on 3/19/18.  C#8 3/22/18 FOLFOX  C#9 4/5/18 FOLFOX        Repeat CT shows slightly improved disease and less ascites now.   Because of progressive fatigue, we stopped the 5-FU bolus and leucovorin and continued with the 5-FU infusion and oxaliplatin.     C#10 5/4/18  C#11 5/18/18  C#13  5/31/18  C#13 6/15/18    Repeat CT scan showed worsening peritoneal disease. There is also increased fluid in the proximal control on as well as post stent fluid in the rectum. This could be consistent with colitis, so I gave him levofloxacin/flagyl and plan was to switch therapy to irinotecan and panitumumab though it has not been started as he was hospitalized on 7/3 for abdominal pain and bloating as well as rectal bleeding. Found to have colitis with increased fluid dilation of the colon. GI did a flex sig and placed a colon stent for the third time. Other stents were found to have ingrowth of tumor.   Again admitted on 7/24 with SBO and PAULINA. Venting G tube placed which helped with N/V.     He initiated Irinotecan and Panitumumab on 8/17/18.  Cycle #2 was delayed because of dehydration and Acute kidney injury and hyponatremia and weight loss.  Cycle #2 on 9/7/2018  Cycle #3 on 9/21/2018      Repeat CT scan done on 10/4/18 shows progression of the disease with worsening peritoneal carcinomatosis as well as increase in the size of the primary tumor  as well as enlargement of mesenteric and retroperitoneal lymph nodes. Multiple enlarged right greater than left cardiophrenic lymph nodes, increased from prior.    We again discussed the situation and we again discussed that Regorafenib of the [place] are unlikely to benefit him and at this time it is best that we focus all her efforts in making and keeping him comfortable.  He completely agrees with it.  He is following with palliative care closely.  I recommended hospice.  He is going to discuss this with palliative care as well and is strongly considering enrolling in hospice.  At this time he also does not want any routine CT scans and I agree with this.    Pain.  Continue Dilaudid PCA as this is helping him with the pain.  Continue close follow-up with palliative care.    Nausea.  Take Compazine as directed by palliative care.    Anemia.  Hemoglobin is 8.  This is  likely a combination of anemia of chronic disease and previous chemotherapy with possibility of some iron deficiency.  At this time there is no indication to give him blood.  I do not recommend giving him iron supplement right now because I would like to limit any interventions which are not going to significantly improve his outcome.    At this time I am not making further appointments with me but he knows to contact me if he has any questions or concerns.  He is going to discuss with palliative care about hospice and make final decision about it after that.    I answered all of his questions to his satisfaction.  He is agreeable with the plan        Albert Long MD

## 2018-12-06 NOTE — MR AVS SNAPSHOT
After Visit Summary   12/6/2018    Sebas Lopez    MRN: [id number]           Patient Information     Date Of Birth          1965        Visit Information        Provider Department      12/6/2018 12:00 PM Albert Long MD [facility]        Today's Diagnoses     Colon adenocarcinoma (H)    -  1    Peritoneal carcinomatosis (H)          Care Instructions    Follow with palliative care    See me as needed          Follow-ups after your visit        Your next 10 appointments already scheduled     Dec 11, 2018  2:30 PM CST   (Arrive by 2:15 PM)   Return Visit with Lydia Beckham MD   [facility] ([facility])    [address] [place] [address]  [address]  [facility] [address] [phone]            Serjio 15, 2019  1:30 PM CST   (Arrive by 1:15 PM)   Return Visit with Lydia Beckham MD   [facility] ([facility])    [address] [place] [address]  [address]  [facility] [address] [phone]              Who to contact     If you have questions or need follow up information about today's clinic visit or your schedule please contact [facility] directly at [phone].  Normal or non-critical lab and imaging results will be communicated to you by [employer], letter or phone within 4 business days after the clinic has received the results. If you do not hear from us within 7 days, please contact the clinic through [employer] family [employer] or phone. If you have a critical or abnormal lab result, we will notify you by phone as soon as possible.  Submit refill requests through [employer] or call your pharmacy and they will forward the refill request to us. Please allow 3 business days for your refill to be completed.          Additional Information About Your Visit        [employer] family [employer] Information     [employer] gives you secure access to your electronic health record. If you see a primary care provider,  you can also send messages to your care team and make appointments. If you have questions, please call your primary care clinic.  If you do not have a primary care provider, please call [phone] and they will assist you.        Care EveryWhere ID     This is your Care EveryWhere ID. This could be used by other organizations to access your [place] medical records  [id number]        Your Vitals Were     Pulse Temperature Pulse Oximetry BMI (Body Mass Index)          96 98.5  F (36.9  C) (Oral) 98% 22.34 kg/m2         Blood Pressure from Last 3 Encounters:   12/06/18 117/73   11/27/18 122/75   11/13/18 117/74    Weight from Last 3 Encounters:   12/06/18 68.6 kg (151 lb 4.8 oz)   11/27/18 66.7 kg (147 lb 2 oz)   11/13/18 67.6 kg (149 lb)              Today, you had the following     No orders found for display       Primary Care Provider Office Phone # Fax #    Jaguar [facility] [phone]       [facility] [address]  [facility] [address]        Equal Access to Services     [facility] : Hadii aad ku hadasho Soomaali, waaxda luqadaha, qaybta kaalmada adebenitayada, bhargav sage . So [facility] [phone].    ATENCIÓN: Si habla español, tiene a cid disposición servicios gratuitos de asistencia lingüística. [facility] [phone].    We comply with applicable federal civil rights laws and [place] laws. We do not discriminate on the basis of race, color, national origin, age, disability, sex, sexual orientation, or gender identity.            Thank you!     Thank you for choosing [facility]  for your care. Our goal is always to provide you with excellent care. Hearing back from our patients is one way we can continue to improve our services. Please take a few minutes to complete the written survey that you may receive in the mail after your visit with us. Thank you!             Your Updated Medication List - Protect others around you: Learn how to  safely use, store and throw away your medicines at www.disposemymeds.org.          This list is accurate as of 12/6/18 12:44 PM.  Always use your most recent med list.                   Brand Name Dispense Instructions for use Diagnosis    dexamethasone 2 MG tablet    DECADRON          * hydromorphone HCl 500 MG/50ML Soln     25 mL    Dilute 250mg in 250ml NS  0.9mg IV every 10 mins as needed  Dispense 1 bag (250mg = 25ml)    Neoplasm related pain       * hydromorphone HCl 500 MG/50ML Soln     30 mL    Dilute 300mg in 300ml NS  1.2mg IV every 10 mins as needed  Dispense 1 bag (300mg = 30ml = 7 day supply)    Neoplasm related pain       * hydromorphone HCl 500 MG/50ML Soln     30 mL    Dilute 300mg in 300ml NS  1.2mg IV every 10 mins as needed  Dispense 1 bag (300mg = 30ml = 7 day supply)    Neoplasm related pain       * hydromorphone HCl 500 MG/50ML Soln     30 mL    Dilute 300mg in 300ml NS  1.2mg IV every 10 mins as needed  Dispense 1 bag (300mg = 30ml = 7 day supply)    Neoplasm related pain       * hydromorphone HCl 500 MG/50ML Soln   Start taking on:  12/10/2018     30 mL    Dilute 300mg in 300ml NS  1.2mg IV every 10 mins as needed  Dispense 1 bag (300mg = 30ml = 7 day supply)    Neoplasm related pain       * hydromorphone HCl 500 MG/50ML Soln   Start taking on:  12/17/2018     30 mL    Dilute 300mg in 300ml NS  1.2mg IV every 10 mins as needed  Dispense 1 bag (300mg = 30ml = 7 day supply)    Neoplasm related pain       * hydromorphone HCl 500 MG/50ML Soln   Start taking on:  12/24/2018     30 mL    Dilute 300mg in 300ml NS  1.2mg IV every 10 mins as needed  Dispense 1 bag (300mg = 30ml = 7 day supply)    Neoplasm related pain       loperamide 2 MG capsule    IMODIUM     Take 2 mg by mouth as needed for diarrhea        LORazepam 0.5 MG tablet    ATIVAN    30 tablet    Take 1 tablet (0.5 mg) by mouth every 4 hours as needed (Anxiety, Nausea/Vomiting or Sleep)    Peritoneal carcinomatosis (H), Cancer of sigmoid  colon (H), Nausea and vomiting, intractability of vomiting not specified, unspecified vomiting type, Insomnia due to medical condition       naloxone 4 MG/0.1ML nasal spray    NARCAN    0.2 mL    Spray 1 spray (4 mg) into one nostril alternating nostrils as needed for opioid reversal every 2-3 minutes until assistance arrives    Cancer of sigmoid colon (H), Long term current use of opiate analgesic       ondansetron 8 MG ODT tab    ZOFRAN-ODT    60 tablet    Take 1 tablet (8 mg) by mouth every 8 hours as needed for nausea    Nausea       order for DME     1 Device    Equipment being ordered: home suction machine with tubing for connection to venting G-tube Treatment Diagnosis: colon adenocarcinoma    Colon adenocarcinoma (H), Small bowel obstruction (H)       order for DME     1 Units    Equipment being ordered: intermittent suction equipment for venting gtube    Colon adenocarcinoma (H), Peritoneal carcinomatosis (H), Attention to G-tube (H)       parenteral nutrition - PTA/DISCHARGE ORDER      Inject into the vein daily [facility]        prochlorperazine 10 MG tablet    COMPAZINE    90 tablet    Take 1 tablet (10 mg) by mouth every 6 hours as needed for nausea    Peritoneal carcinomatosis (H), Nausea and vomiting, intractability of vomiting not specified, unspecified vomiting type       sodium chloride 0.9% Soln BOLUS     1000 mL    Inject 1,000 mLs into the vein daily as needed For hydration.        * Notice:  This list has 7 medication(s) that are the same as other medications prescribed for you. Read the directions carefully, and ask your doctor or other care provider to review them with you.

## 2018-12-06 NOTE — PROGRESS NOTES
Oncology follow up visit:  Date on this visit: 12/6/2018         CC  sigmoid adenocarcinoma with peritoneal carcinomatosis- MSI- Intact KRAS wild type    Primary Physician: Waylon Han     History Of Present Illness:     Please see previous note for details. I have copied and updated from prior notes.   Sebas is a 53-year-old male who has a history of ulcerative colitis diagnosed more than 20 years ago, but he has not had medical management for that.  He was doing well, but in 05/2017 he presented with a few weeks of abdominal pain.  At that time, his imaging showed that he had a sigmoid wall thickening with adjacent mesenteric lymphadenopathy and free fluid near the abdominal wall mesh from his prior hernia surgery.  He subsequently underwent a colonoscopy which was incomplete and showed an obstructing sigmoid colon mass.  The biopsy from the sigmoid mass showed sigmoid adenocarcinoma.  He then developed obstructive symptoms and underwent exploratory laparotomy on 07/10/2017.  During that he was found to have diffuse peritoneal carcinomatosis.  Extensive lysis of adhesions was performed and a small bowel resection was performed with end ileostomy.  The biopsies from the multiple large peritoneal metastasis also were positive for metastatic adenocarcinoma.      He started palliative FOLFOX on 8/11/17. C#6 given on 10/26/17  After 6 cycles, he has stable disease    On 12/6/17, he had Diagnostic laparoscopy and Exploratory laparotomy, but HIPEC was not performed as Peritoneal cancer index was 26 with diffuse involvement of the small bowel as well as the small bowel mesentery.     He then presented to ED on 1/26 with abdominal pain and associated nausea. CT A/P on 1/26 with 5.2 x 4.5 x 3.6 cm proximal sigmoid mass causnig colonic obstriction with singificant distention of cecum (7cm in diameter), ascending colon and proximal transverse colon. No pneumatosis or pneumoperitoneum. Also small volume of ascites with  associated findings concerning for peritoneal carcinomatosis, increased from prior studies. Dr. Dudley was consulted and recommended no surgical intervention.    He was then seen in clinic on 1/29/2018 as well as yesterday because for the last few days he was unable to tolerate solid foods and he was getting abdominal cramping and some nausea. He also noticed that his ostomy output decreased. He was given IV fluids as well as antibiotics and yesterday he was started on OxyContin 10 mg twice a day along with p.r.n. oxycodone. Of note a few weeks ago he was started on short acting octreotide to decrease the ostomy output but he stopped taking it about one week ago and few days prior to that he noticed worsening of the abdominal cramping.      Repeat CT scan showed worsening peritoneal carcinomatosis and he is getting more symptomatic in terms of worsening abdominal pain and difficulty eating because of worsening abdominal cramping. He was admitted to the hospital with worsening colon distention and colon stent was placed which improved his symptoms.     He was recently hospitalized from 2/20-2/25/18 with presumed infectious colitis and bacteremia. His port was removed. He was discharged home on IV vancomycin via a PICC line which he completed on 3/7/2018.     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 He was hospitalized with a bowel obstruction from 3/18-3/20/18 and a colonic stent was placed on 3/19/18.  C#8 3/22/18 FOLFOX  C#9 4/5/18 FOLFOX      Repeat CT shows slightly improved disease and less ascites now.   Because of progressive fatigue, we stopped the 5-FU bolus and leucovorin and continued with the 5-FU infusion and oxaliplatin.     C#10 5/4/18  C#11 5/18/18  C#13 5/31/18  C#13 6/15/18    Repeat CT scan showed worsening peritoneal disease. There is also increased fluid in the proximal control on as well as post stent fluid in the rectum. This could be  consistent with colitis  I gave him levofloxacin/flagyl and plan was to switch therapy to irinotecan and panitumumab though was hospitalized on 7/3 for abdominal pain and bloating as well as rectal bleeding. Found to have colitis with increased fluid dilation of the colon. GI did a flex sig and placed a colon stent for the third time. Other stents were found to have ingrowth of tumor.   Again admitted on 7/24 with SBO and PAULINA. Venting G tube placed which helped with N/V.      He initiated Irinotecan and Panitumumab on 8/17/18.  Cycle #2 was delayed because of dehydration and Acute kidney injury and hyponatremia and weight loss.  Cycle #2 on 9/7/2018  Cycle #3 on 9/21/2018    Repeat CT scan done on 10/4/18 shows progression of the disease with worsening peritoneal carcinomatosis as well as increase in the size of the primary tumor  as well as enlargement of mesenteric and retroperitoneal lymph nodes. Multiple enlarged right greater than left cardiophrenic lymph nodes, increased from prior.    I discussed options with him including Regorafenib versus Lonsurf although because of chronic obstruction and poor absorption I told him that it would be unlikely that he would benefit from these treatments.  He opted not to start those treatments.    He has been following with palliative care and he comes in today and tells me that overall his pain is under better control with Dilaudid PCA.  He gets some nausea with the pain and takes Compazine.  Overall his energy has been fair although low.  He eats a little and uses her venting G-tube and he thinks half of it comes out of the venting G-tube.  Ostomy is working well.  Denies bleeding.  No trouble breathing.  No neuropathy.  No new swellings.  He continues to be on TPN.    ECOG 2    ROS:  A comprehensive ROS was otherwise neg      I reviewed her history in Epic as below      Past Medical/Surgical History:  Past Medical History:   Diagnosis Date     Adenocarcinoma of sigmoid  colon (H)     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     Ulcerative colitis (H)      Past Surgical History:   Procedure Laterality Date     COLONOSCOPY  2017     COLONOSCOPY N/A 11/30/2017    Procedure: COLONOSCOPY;  Colonoscopy;  Surgeon: Rob Dudley MD;  Location: [facility]     COLONOSCOPY N/A 2/6/2018    Procedure: COMBINED COLONOSCOPY, STENT PLACEMENT;  COMBINED COLONOSCOPY with Colonic Stent Placement;  Surgeon: Guru Lionel Mar MD;  Location: [facility]     COLONOSCOPY N/A 3/19/2018    Procedure: COMBINED COLONOSCOPY, STENT PLACEMENT;  flexible sigmoidoscopy with stent placement and dilation;  Surgeon: Alexis Rios MD;  Location: [facility]     COLONOSCOPY N/A 7/5/2018    Procedure: COMBINED COLONOSCOPY, STENT PLACEMENT;  flexible sigmoidoscopy with colonic stent placement ;  Surgeon: Alexis Rios MD;  Location: [facility]     GI SURGERY  07/10/2017    Extensive lysis of adhesions, small bowel resection with end ileostomy.      HERNIA REPAIR       INSERT PORT VASCULAR ACCESS Right 8/10/2017    Procedure: INSERT PORT VASCULAR ACCESS;  Single Lumen Right Chest Power Port;  Surgeon: Malena Andrew PA-C;  [facility]: [facility]     LAPAROSCOPY DIAGNOSTIC (GENERAL) N/A 12/6/2017    Procedure: LAPAROSCOPY DIAGNOSTIC (GENERAL);  Diagnostic Laparoscopy, Exploratory Laparotomy Anesthesia Block ;  Surgeon: Rob Dudley MD;  Location: [facility]     LAPAROTOMY EXPLORATORY N/A 12/6/2017    Procedure: LAPAROTOMY EXPLORATORY;;  Surgeon: Rob Dudley MD;  Location: [facility]     ORTHOPEDIC SURGERY Left     HIP ARTHROPLASTY     PICC INSERTION Right 02/23/2018    5Fr DL BioFlo PICC, 44cm (3cm external) in the R basilic vein w/ tip in the SVC RA junction      Ulcerative colitis.   Left hip replacement.       Cancer History:   As above    Allergies:  Allergies as of 12/06/2018 - Julius as Reviewed 12/06/2018   Allergen Reaction Noted     Liquid adhesive Rash 03/01/2018     Current Medications:  Current Outpatient Prescriptions   Medication Sig Dispense Refill     dexamethasone (DECADRON) 2 MG tablet        hydromorphone HCl 500 MG/50ML SOLN Dilute 250mg in 250ml NS    0.9mg IV every 10 mins as needed    Dispense 1 bag (250mg = 25ml) 25 mL 0     loperamide (IMODIUM) 2 MG capsule Take 2 mg by mouth as needed for diarrhea       LORazepam (ATIVAN) 0.5 MG tablet Take 1 tablet (0.5 mg) by mouth every 4 hours as needed (Anxiety, Nausea/Vomiting or Sleep) 30 tablet 5     ondansetron (ZOFRAN-ODT) 8 MG ODT tab Take 1 tablet (8 mg) by mouth every 8 hours as needed for nausea 60 tablet 5     order for DME Equipment being ordered: intermittent suction equipment for venting gtube 1 Units 0     parenteral nutrition - PTA/DISCHARGE ORDER Inject into the vein daily [facility]       prochlorperazine (COMPAZINE) 10 MG tablet Take 1 tablet (10 mg) by mouth every 6 hours as needed for nausea 90 tablet 1     sodium chloride 0.9% SOLN BOLUS Inject 1,000 mLs into the vein daily as needed For hydration. 1000 mL 0     [START ON 12/24/2018] hydromorphone HCl 500 MG/50ML SOLN Dilute 300mg in 300ml NS    1.2mg IV every 10 mins as needed    Dispense 1 bag (300mg = 30ml = 7 day supply) (Patient not taking: Reported on 11/27/2018) 30 mL 0     [START ON 12/17/2018] hydromorphone HCl 500 MG/50ML SOLN Dilute 300mg in 300ml NS    1.2mg IV every 10 mins as needed    Dispense 1 bag (300mg = 30ml = 7 day supply) (Patient not taking: Reported on 11/27/2018) 30 mL 0     [START ON 12/10/2018] hydromorphone HCl 500 MG/50ML SOLN Dilute 300mg in 300ml NS    1.2mg IV every 10 mins as needed    Dispense 1 bag (300mg = 30ml = 7 day supply) (Patient not taking: Reported on 11/27/2018) 30 mL 0     hydromorphone HCl 500 MG/50ML SOLN Dilute 300mg in  300ml NS    1.2mg IV every 10 mins as needed    Dispense 1 bag (300mg = 30ml = 7 day supply) (Patient not taking: Reported on 11/27/2018) 30 mL 0     hydromorphone HCl 500 MG/50ML SOLN Dilute 300mg in 300ml NS    1.2mg IV every 10 mins as needed    Dispense 1 bag (300mg = 30ml = 7 day supply) (Patient not taking: Reported on 11/27/2018) 30 mL 0     hydromorphone HCl 500 MG/50ML SOLN Dilute 300mg in 300ml NS    1.2mg IV every 10 mins as needed    Dispense 1 bag (300mg = 30ml = 7 day supply) (Patient not taking: Reported on 11/27/2018) 30 mL 0     naloxone (NARCAN) nasal spray Spray 1 spray (4 mg) into one nostril alternating nostrils as needed for opioid reversal every 2-3 minutes until assistance arrives (Patient not taking: Reported on 11/13/2018) 0.2 mL 0     order for DME Equipment being ordered: home suction machine with tubing for connection to venting G-tube  Treatment Diagnosis: colon adenocarcinoma (Patient not taking: Reported on 12/6/2018) 1 Device 0      Family History:  Family History   Problem Relation Age of Onset     Hypertension Father      Diabetes Father       No family history of cancer.  He does not have any kids.       Social History:  Social History     Social History     Marital status: Single     Spouse name: N/A     Number of children: N/A     Years of education: N/A     Occupational History     Not on file.     Social History Main Topics     Smoking status: Never Smoker     Smokeless tobacco: Never Used     Alcohol use 3.0 oz/week     5 Cans of beer per week      Comment: none for last 4 months     Drug use: No     Sexual activity: Not on file     Other Topics Concern     Not on file     Social History Narrative   He does not smoke and does not drink.  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  He lives with his girlfriend, Bessie.       Physical Exam:  /73  Pulse 96  Temp 98.5  F (36.9  C) (Oral)  Wt 68.6 kg (151 lb 4.8 oz)  SpO2 98%  BMI 22.34 kg/m2  CONSTITUTIONAL:  No apparent distress  EYES: PERRLA, that is pallor but no jaundice  ENT/MOUTH: Ears unremarkable. No oral lesions  CVS: s1s2 normal  RESPIRATORY: Chest is clear  GI: Abdomen is has well-healed surgical scars.  Ostomy site and venting G-tube site is clean.  Abdomen is very firm but it is nontender  NEURO: Alert and oriented ×3  INTEGUMENT: no concerning skin rashes   LYMPHATIC: no palpable lymphadenopathy  MUSCULOSKELETAL: Unremarkable. No bony tenderness.   EXTREMITIES: no pedal edema  PSYCH: Mentation, mood and affect are appropriate              Laboratory/Imaging Studies  Results for KARINA MINER (MRN [id number]) as of 12/6/2018 12:04   Ref. Range 11/5/2018 11:25 11/20/2018 00:00 11/20/2018 09:45 11/20/2018 09:45   Sodium Latest Units: mmol/L 135   135   Potassium Latest Units: mmol/L 4.9   4.6   Chloride Latest Units: mmol/L 101   102   Carbon Dioxide Latest Units: mmol/L 27   22   Urea Nitrogen Latest Units: mg/dL 21   18   Creatinine Latest Units: mg/dL 0.62 (L)   0.74   GFR Estimate Latest Units: ml/min/1.73m2 >90   >60   GFR Estimate If Black Latest Units: ml/min/1.73m2 >90   >60   Calcium Latest Units: mg/dL 9.0   9.1   Anion Gap Latest Units: mmol/L 7   11   Magnesium Latest Units: mg/dL   1.7    Phosphorus Latest Units: mg/dL   4.3    Albumin Latest Ref Range: 3.5 - 6.2 g/dL 2.0 (L)   2.6 (L)   Prealbumin Latest Ref Range: 16.0 - 38.0 mg/dL   7.2 (L)    Protein Total Latest Ref Range: 6.0 - 8.0 g/dL 8.6   8.1 (H)   Bilirubin Total Latest Units: mg/dL 0.3  0.5 0.5   Alkaline Phosphatase Latest Units: U/L 146   132   ALT Latest Units: U/L 17   14   AST Latest Units: U/L 30   30   A/G Ratio Latest Ref Range: 1.0 - 2.0     0.5 (L)   Bilirubin Direct Latest Units: mg/dL   0.3    Bilirubin Indirect Latest Units: mg/dL   0.2    BUN/Creatinine Ratio Latest Ref Range: 10 - 20     24 (H)   Ferritin Latest Ref Range: 26 - 388 ng/mL 457 (H)      Globulin Latest Ref Range: 2.0 - 3.7 g/dL    5.5 (H)   Iron  Latest Ref Range: 35 - 180 ug/dL 18 (L)      Iron Binding Cap Latest Ref Range: 240 - 430 ug/dL 149 (L)      Iron Saturation Index Latest Ref Range: 15 - 46 % 12 (L)      Triglycerides Latest Units: mg/dL   75    Glucose Latest Ref Range: 65 - 100 mg/dL 107 (H)   107 (H)   WBC Latest Units: 10^9/L 8.5  8.2    Hemoglobin Latest Units: g/dL 8.1 (L)  8.0    Hematocrit Latest Units: % 26.3 (L)  25.0    Platelet Count Latest Ref Range: 150 - 450 10e9/L 414      Platelet Count Latest Ref Range: 140 - 440 10^9/L   508 (H)    MPV Latest Ref Range: 6.5 - 11.0    11.5 (H)    RBC Count Latest Ref Range: 4.30 - 5.90 10^12/L 3.20 (L)  3.11 (L)    MCV Latest Ref Range: 37.0 - 53.0 fl 82  8.0 (L)    MCH Latest Ref Range: 26.0 - 34.0 pg 25.3 (L)  25.7 (L)    MCHC Latest Units: g/dL 30.8 (L)  32.0    RDW Latest Ref Range: 11.5 - 15.5 % 18.2 (H)  17.7 (H)    Diff Method Unknown Automated Method      % Neutrophils Latest Units: % 64.4  67.2    % Lymphocytes Latest Units: % 20.8  15.8    % Monocytes Latest Units: % 11.7  13.3    % Eosinophils Latest Units: % 2.2  3.1    % Basophils Latest Units: % 0.5  0.6    % Immature Granulocytes Latest Units: % 0.4      Nucleated RBCs Latest Ref Range: 0 /100 0      Absolute Neutrophil Unknown 5.5  5.5    Absolute Lymphocytes Unknown 1.8  1.3    Absolute Monocytes Latest Ref Range: <0.9  1.0  1.1 (H)    Absolute Eosinophils Unknown 0.2  0.3    Absolute Basophils Unknown 0.0  0.1    Abs Immature Granulocytes Latest Ref Range: 0 - 0.4 10e9/L 0.0      Absolute Nucleated RBC Unknown 0.0      % Retic Latest Ref Range: 0.5 - 2.0 % 1.0      Absolute Retic Latest Ref Range: 25 - 95 10e9/L 30.9        EXAMINATION: CT CHEST/ABDOMEN/PELVIS W CONTRAST, 10/4/2018 11:18 AM     TECHNIQUE:  Helical CT images from the thoracic inlet through the  symphysis pubis were obtained  with contrast. Contrast dose: ISOVUE  370 86cc     COMPARISON: 7/24/2018, 7/3/2028, 6/27/2018 and 5/3/2018.     HISTORY: metastatic colon  cancer, assess response to treatment; Colon  adenocarcinoma (H); Peritoneal carcinomatosis (H); Peritoneal  carcinomatosis (H)     FINDINGS:     Chest: Coarse calcification in the right lobe gland. Right subclavian  PICC terminates near the atriocaval junction. The heart is normal in  size without pericardial effusion. Coronary artery calcification.  Multiple enlarged right greater than left cardiophrenic lymph nodes,  increased from prior, for example measuring up to 11 mm on series 3,  image 245. No suspicious axillary, hilar, or mediastinal  lymphadenopathy.     Punctate right apical calcified granulomas. New geographic groundglass  opacity in the inferomedial left upper lobe, series 5, image 89 with  tiny subpleural groundglass nodule in the inferomedial right upper  lobe, series 5, image 86. Mild bibasilar fibroatelectasis. No focal  consolidation or pleural effusion.     Abdomen and pelvis: Percutaneous gastrostomy tube in place, balloon  inflated within the gastric antrum. The stomach is now decompressed.  Previously seen small bowel dilatation is improved with persistent  mildly dilated loops of bowel. There is diffuse bowel wall thickening  and enhancement which may be related to serosal implants and reactive  secondary to adjacent carcinomatosis. Postsurgical changes of distal  small bowel resection. Right mid abdominal end ileostomy. Overlapping  stents are present within the rectum and sigmoid colon, similar in  configuration to the prior exam with narrowing of the lumen of the  sigmoid stent along the proximal aspect of overlap. Persistent  opacification with heterogeneous density within the stents.      Redemonstration of extensive confluent peritoneal carcinomatosis  surrounding the rectosigmoid colon, enveloping multiple bowel loops  and extending throughout the peritoneum with multiple nodular omental  implants and tumor extending along the end ileostomy. The overall  degree of tumor burden in the  abdomen and pelvis has increased  compared to recent prior exams of 7/24/2018 and 7/3/2018, for example  soft tissue thickening around the stent extends approximately 7 cm in  transverse dimension as seen on series 3, image 516, measuring  approximately 5.7 cm on the 7/24/2018 exam when measured using similar  technique. The volume of implants elsewhere is also overall increased  compared to prior, for example tumor measures approximately 4.4 cm in  thickness along the midline ventral mesh, series 3, image 49,  measuring approximately 3 cm in a similar location on the 7/24/2018  exam.      Multiple enlarged lymph nodes in the pelvis are also increased  compared to prior, for example multiple perirectal nodes and nodes  along the sigmoid mesentery, for example measuring 1 cm in short axis  dimension on series 3, image 485, compared to 6 mm on the prior exam.  Mildly enlarged inguinal lymph nodes are similar to prior. Enlarged  lymph nodes along the inferior epigastric arteries, also mildly  enlarged, for example series 3 images 538 and 520 on the right.  Multiple mesenteric and retroperitoneal lymph nodes are also slightly  enlarged. Heterogeneous peritoneal implants along the surface of the  liver with mild scalloping and contour nodularity are slightly  increased. Heterogeneous hypodense tissue also extends along the park  hepatis and periportal regions with hypodensity along the medial  aspect of segment 5 and 6 presumably related to peritoneal implants.  No definite new liver lesions.      Unchanged mild dilatation of the common bile duct. The spleen is  stable in appearance. The adrenal glands are unremarkable. No  hydronephrosis. Stable subcentimeter hypodensities in the anterior mid  right kidney with adjacent likely peritoneal implants. Peritoneal  implants/retroperitoneal lymph nodes along the right inferior pole  have also increased. The bladder is partially distended. Evaluation of  the deep pelvis is  mildly limited secondary to beam hardening artifact  from left total hip arthroplasty. Marked flattening of the infrarenal  IVC with adjacent tumor. No significant free intraperitoneal fluid or  air.     Bones and soft tissues: Multiple scattered sclerotic foci, for example  in the medial left clavicle and in the ribs, not significantly  changed. Left-sided rib fractures. Left total hip arthroplasty in  place with stable lucency along the left acetabulum/iliac wing.  Unchanged irregularity along the right scapula. The bones otherwise  appear demineralized with degenerative change.     Persistent and mildly progressed soft tissue tumoral implants along  the ventral midline abdomen extending above and below the umbilicus as  well as along the end ileostomy.         IMPRESSION:   1. Interval percutaneous gastrostomy tube placement with gastric  decompression and improvement in previously seen small bowel  dilatation.   2. Overall evidence of disease progression with increase in extensive  carcinomatosis and peritoneal tumor burden as well as enlargement of  mesenteric and retroperitoneal lymph nodes. Tumor extension along the  liver and subcutaneous soft tissues is also mildly increased. Marked  flattening of the infrarenal IVC with adjacent tumor.   3. Overlapping stents are present within the rectum and sigmoid colon,  similar in configuration to the prior exam with persistent narrowing  of the lumen and intraluminal opacification. Persistent diffuse bowel  wall thickening and hyperemia compatible with ongoing  inflammation/infection.  4. New geographic groundglass opacity in the inferomedial left upper  lobe, and tiny subpleural groundglass nodule in the inferomedial right  upper lobe may be infectious/inflammatory. Recommend continued  attention on follow-up imaging. Multiple enlarged right greater than  left cardiophrenic lymph nodes, increased from prior.        NGS PANEL COLORECTAL    No mutations were  identified in analyzed regions of KRAS, NRAS, BRAF, HRAS, or PIK3CA.       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  The sigmoid carcinoma is obstructing, requiring exploratory laparotomy and end ileostomy along with small bowel resection.    MSI intact and NGS panel does not reveal any identifiable mutations     He has been started on FOLFOX palliative chemotherapy. After 6 cycles, he had stable disease      On 12/6/17, he had Diagnostic laparoscopy and Exploratory laparotomy, but HIPEC was not performed as Peritoneal cancer index was 26 with diffuse involvement of the small bowel as well as the small bowel mesentery.     Repeat CT scan showed worsening peritoneal carcinomatosis and he was getting more symptomatic in terms of worsening abdominal pain and difficulty eating because of worsening abdominal cramping. He was admitted to the hospital with worsening colon distention and colon stent was placed which improved his symptoms.     He was hospitalized from 2/20-2/25/18 with presumed infectious colitis and bacteremia. His port was removed. He was discharged home on IV vancomycin via PICC line. He completed vancomycin on 3/7/2018.    He resumed FOLFOX (C#7) with dose reduced oxaliplatin (70mg/m2) due to previous neuropathy on 3/8/18. He was hospitalized with a bowel obstruction from 3/18-3/20/18 and a colonic stent was placed on 3/19/18.  C#8 3/22/18 FOLFOX  C#9 4/5/18 FOLFOX        Repeat CT shows slightly improved disease and less ascites now.   Because of progressive fatigue, we stopped the 5-FU bolus and leucovorin and continued with the 5-FU infusion and oxaliplatin.     C#10 5/4/18  C#11 5/18/18  C#13 5/31/18  C#13 6/15/18    Repeat CT scan showed worsening peritoneal disease. There is also increased fluid in the proximal control on as well as post stent fluid in the rectum. This could be consistent with colitis, so I gave him levofloxacin/flagyl and plan was  to switch therapy to irinotecan and panitumumab though it has not been started as he was hospitalized on 7/3 for abdominal pain and bloating as well as rectal bleeding. Found to have colitis with increased fluid dilation of the colon. GI did a flex sig and placed a colon stent for the third time. Other stents were found to have ingrowth of tumor.   Again admitted on 7/24 with SBO and PAULINA. Venting G tube placed which helped with N/V.     He initiated Irinotecan and Panitumumab on 8/17/18.  Cycle #2 was delayed because of dehydration and Acute kidney injury and hyponatremia and weight loss.  Cycle #2 on 9/7/2018  Cycle #3 on 9/21/2018      Repeat CT scan done on 10/4/18 shows progression of the disease with worsening peritoneal carcinomatosis as well as increase in the size of the primary tumor  as well as enlargement of mesenteric and retroperitoneal lymph nodes. Multiple enlarged right greater than left cardiophrenic lymph nodes, increased from prior.    We again discussed the situation and we again discussed that Regorafenib of the [place] are unlikely to benefit him and at this time it is best that we focus all her efforts in making and keeping him comfortable.  He completely agrees with it.  He is following with palliative care closely.  I recommended hospice.  He is going to discuss this with palliative care as well and is strongly considering enrolling in hospice.  At this time he also does not want any routine CT scans and I agree with this.    Pain.  Continue Dilaudid PCA as this is helping him with the pain.  Continue close follow-up with palliative care.    Nausea.  Take Compazine as directed by palliative care.    Anemia.  Hemoglobin is 8.  This is likely a combination of anemia of chronic disease and previous chemotherapy with possibility of some iron deficiency.  At this time there is no indication to give him blood.  I do not recommend giving him iron supplement right now because I would like to limit  any interventions which are not going to significantly improve his outcome.    At this time I am not making further appointments with me but he knows to contact me if he has any questions or concerns.  He is going to discuss with palliative care about hospice and make final decision about it after that.    I answered all of his questions to his satisfaction.  He is agreeable with the plan    Albert Long

## 2018-12-06 NOTE — NURSING NOTE
"Oncology Rooming Note    December 6, 2018 11:40 AM   Sebas Lopez is a 53 year old male who presents for:    Chief Complaint   Patient presents with     Oncology Clinic Visit     Return; Colon CA     Initial Vitals: /73  Pulse 96  Temp 98.5  F (36.9  C) (Oral)  Wt 68.6 kg (151 lb 4.8 oz)  SpO2 98%  BMI 22.34 kg/m2 Estimated body mass index is 22.34 kg/(m^2) as calculated from the following:    Height as of 11/27/18: 1.753 m (5' 9\").    Weight as of this encounter: 68.6 kg (151 lb 4.8 oz). Body surface area is 1.83 meters squared.  Moderate Pain (4) Comment: Lower Abdomen, more so on the left side   No LMP for male patient.  Allergies reviewed: Yes  Medications reviewed: Yes    Medications: Medication refills not needed today.  Pharmacy name entered into [employer]:    [facility] [address] [facility]    Clinical concerns: No Concerns [place] was NOT notified.    7 minutes for nursing intake (face to face time)     Airam Baker MA              "

## 2018-12-07 NOTE — PROGRESS NOTES
This is a recent snapshot of the patient's [facility] medical record.  For current drug dose and complete information and questions, call [phone] or In Basket pool, [facility] ([id number])  CSN Number:  [id number]

## 2018-12-07 NOTE — TELEPHONE ENCOUNTER
Received VM from patient - patient got gastric suction pump ordered by MD - would like clarification on what settings should be.    Message sent to MD for clarification.

## 2018-12-09 PROBLEM — R10.9 ABDOMINAL PAIN: Status: ACTIVE | Noted: 2018-01-01

## 2018-12-09 NOTE — H&P
"Heme/Onc History and Physical    Sebas Lopez MRN# [id number]   Age: 53 year old YOB: 1965     Date of Admission:  12/9/2018    Primary care provider: Jaguar Becker          Assessment and Plan:   Assessment:  Patient is a 53 year old male with recurrent SBO in the setting of progressive metastatic colon cancer and peritoneal carcinomatosis S/P venting G-tube and ileostomy and on home PCA dilaudid who presented with worsening abdominal pain and N/V and was noted to have SBO on CT A/P.     He was last admitted to [facility] from 10/19-10/23 for PICC-associated K. pneumoniae bacteremia. It was treated with Zosyn switched to ceftriaxone based on C&S for total of 14 days from 10/20. His PICC was also replaced.     Plan:  #Recurrent SBO  #Metastatic colon cancer  #S/P ileostomy for chronic bowel obstruction  #S/P venting G-tube   Patient has stage IV sigmoid adenocarcinoma with peritoneal carcinomatosis in the setting of H/O ulcerative colitis. It has been recurrent and progressive on serial CT exams despite multiple lines of chemotherapy, most recently Irinotecan and Panitumumab (C3 on 9/21). He is S/P ileostomy for chronic small bowel obstruction. He has a venting G-tube. He is also on home PCA dilaudid pump with settings of 8 mg/hr as basal rate and 3.5 mg bolus q15min at the time of admission. He used to be on fentanyl patches in the past before transition to PCA.     Patient has been having increase in nausea/vomiting and abdominal pain which is not well controlled on his current PCA settings. He would leave his G-tube closed for the most part but has been opening it to gravity lately due to pain. He got an electric pump on last Thursday to help with drainage through the G-tube which patient/wife had set on \"intermittent/lowest setting\" with inability to get much out. As a result, they went back to gravity suction. His ileostomy output has also subjectively diminished recently. In ED, his G-tube was " changed to wall suction leading to copious greenish output and mild symptomatic relief. CT A/P done in ED showed SBO proximal to the right mid abdomen ileostomy and further disease progression. No peritoneal signs on exam. Lactic acid and lipase nml.     - Continue G-tube to intermittent suction.   - Surgical oncology consult for further palliative optimization if possible. Case was briefly discussed with them.   - NPO except for meds. Patient is on nightly TPN through his left arm PICC which should be OK.   - Pain control as below.   - Add Zofran ODT 8 mg qAC and at bedtime.  - Add Zyprexa 5 mg at bedtime.  - Add dexamethasone 4 mg PO qAM.  - Add compazine PRN/continue PTA ativan PRN for nausea.     #Progressive pain   Progressive pain is due to progressive disease also contributed by recurrent SBO. Patient is on home PCA dilaudid pump. He used to be on fentanyl patches in the past before transition to PCA. Of note, he was not on any basal rate and only bolus dose 0.9 mg q10min at last discharge on 10/23. Since that time, his pain requirements have significantly increased requiring frequent readjustments by outpatient palliative care and he was on 8 mg/hr basal rate with bolus 3.5 mg q15min at the time of admission. For the past 24 hours, patient's total continuous and PCA doses were 190.25 mg and 122.5 mg, respectively.     - Increase basal PCA rate ~25% to 10 mg/ml.  - Keep bolus dose of 3.5 mg q15min.   - Dilaudid IV 1-2 mg IV PRN q2h for breakthrough pain.  - Consider Fentanyl PCA if the above does not provide adequate pain relief.   - Add dexamethasone 4 mg PO qAM.   - Palliative med consult for assist with pain management.     #Progressive metastatic disease  CT A/P done in ED showed interval further worsening of metastatic disease - progression of peritoneal carcinomatosis, new hepatic capsule scalloping, increased mesenteric/RP LAD and increased intra- and extrahepatic ductal dilatation. New small B/L  pleural effusions. Patient last saw his oncologist Dr. LAURA Long on 12/6. He also follows with pall med as outpatient for pain management and advanced care planning. While there are no viable medical treatment options left for him at this time, he is not formally comfort cares yet and wants to be FULL code. He has an upcoming appointment with pall med on 12/11 for further discussion about hospice.    - Pall med consult for assist with GOC and ongoing hospice dialogue.     #New mild B/L hydronephrosis  #Mild perinephric fat stranding  New mild B/L hydronephrosis on admission CT A/P is likely from increasing obstruction from progressive metastatic disease. Mild perinephric fat stranding is nonspecific and likely inflammatory given absence of clinical S/S of pyelonephritis and negative UTI.  - Monitor UOP.      #Chronic mild leucocytosis  Likely related to progressive disease. No suspicion of infection at this time.   - Monitor CBC.        FEN  - NPO except for meds.   - Patient is on TPN 8PM - 8AM through his left arm PICC which should be OK.   - He does not want to take TPN tonight. Will start MIVF until he starts TPN.   - PRN lyte replacement per standing protocol.     Prophylaxis   - Heparin subcutaneous TID for VTE ppx.     Code Status: FULL. Discussed and confirmed with patient.     Disposition: Inpatient oncology service.      Gilbert Malloy MD  Hospitalist, Hematology and Oncology  12/09/2018              Chief Complaint:   Worsened abdominal pain and N/V         History of Present Illness:   Patient is a 53 year old male with recurrent SBO in the setting of progressive metastatic colon cancer and peritoneal carcinomatosis S/P venting G-tube and ileostomy and on home PCA dilaudid who presented with worsening abdominal pain and N/V and was noted to have SBO on CT A/P.     Patient has stage IV sigmoid adenocarcinoma with peritoneal carcinomatosis in the setting of H/O ulcerative colitis. It has been recurrent and  "progressive on serial CT exams despite multiple lines of chemotherapy, most recently Irinotecan and Panitumumab (C3 on 9/21). He is S/P ileostomy for chronic small bowel obstruction. He has a venting G-tube. He is also on home PCA dilaudid pump with settings of 8 mg/hr as basal rate and 3.5 mg bolus q15min at the time of admission. He used to be on fentanyl patches in the past before transition to PCA.     Patient has been having increase in nausea/vomiting and abdominal pain which is not well controlled on his current PCA settings. He would leave his G-tube closed for the most part but has been opening it to gravity lately due to pain. He got an electric pump on last Thursday to help with drainage through the G-tube which patient/wife had set on \"intermittent/lowest setting\" with inability to get much out. As a result, they went back to gravity suction. His ileostomy output has also subjectively diminished recently. In ED, his G-tube was changed to wall suction leading to copious greenish output and mild symptomatic relief. CT A/P done in ED showed SBO proximal to the right mid abdomen ileostomy and further disease progression.             Review of Systems:     14-point review of systems was otherwise negative except as noted in HPI.          Past Medical History:   Past medical history was reviewed.   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   Past Surgical History:   Procedure Laterality Date     COLONOSCOPY  2017     COLONOSCOPY N/A 11/30/2017    Procedure: COLONOSCOPY;  Colonoscopy;  Surgeon: Rob Dudley MD;  " Location: [facility]     COLONOSCOPY N/A 2/6/2018    Procedure: COMBINED COLONOSCOPY, STENT PLACEMENT;  COMBINED COLONOSCOPY with Colonic Stent Placement;  Surgeon: Guru Lionel Mar MD;  Location: [facility]     COLONOSCOPY N/A 3/19/2018    Procedure: COMBINED COLONOSCOPY, STENT PLACEMENT;  flexible sigmoidoscopy with stent placement and dilation;  Surgeon: Alexis Rios MD;  Location: [facility]     COLONOSCOPY N/A 7/5/2018    Procedure: COMBINED COLONOSCOPY, STENT PLACEMENT;  flexible sigmoidoscopy with colonic stent placement ;  Surgeon: Alexis Rios MD;  Location: [facility]     GI SURGERY  07/10/2017    Extensive lysis of adhesions, small bowel resection with end ileostomy.      HERNIA REPAIR       INSERT PORT VASCULAR ACCESS Right 8/10/2017    Procedure: INSERT PORT VASCULAR ACCESS;  Single Lumen Right Chest Power Port;  Surgeon: Malena Andrew PA-C;  [facility]: [facility]     LAPAROSCOPY DIAGNOSTIC (GENERAL) N/A 12/6/2017    Procedure: LAPAROSCOPY DIAGNOSTIC (GENERAL);  Diagnostic Laparoscopy, Exploratory Laparotomy Anesthesia Block ;  Surgeon: Rob Dudley MD;  Location: [facility]     LAPAROTOMY EXPLORATORY N/A 12/6/2017    Procedure: LAPAROTOMY EXPLORATORY;;  Surgeon: Rob Dudley MD;  Location: [facility]     ORTHOPEDIC SURGERY Left     HIP ARTHROPLASTY     PICC INSERTION Right 02/23/2018    5Fr DL BioFlo PICC, 44cm (3cm external) in the R basilic vein w/ tip in the SVC RA junction             Social History:   Social history was reviewed.   Social History     Tobacco Use     Smoking status: Never Smoker     Smokeless tobacco: Never Used   Substance Use Topics     Alcohol use: Yes     Alcohol/week: 3.0 oz     Types: 5 Cans of beer per week     Comment: none for last 4 months             Family History:   Family history was reviewed.  Family History   Problem Relation Age of Onset     Hypertension Father      Diabetes Father              Allergies:     Allergies   Allergen  Reactions     Liquid Adhesive Rash     Dermabond, rash with pustules, occurred with abdominal surgery and port removal              Medications:   Medications Reviewed.   Current Facility-Administered Medications   Medication     0.9% sodium chloride BOLUS     ondansetron (ZOFRAN) injection 4 mg     Current Outpatient Medications   Medication Sig     dexamethasone (DECADRON) 2 MG tablet      [START ON 12/24/2018] hydromorphone HCl 500 MG/50ML SOLN Dilute 300mg in 300ml NS    1.2mg IV every 10 mins as needed    Dispense 1 bag (300mg = 30ml = 7 day supply) (Patient not taking: Reported on 11/27/2018)     [START ON 12/17/2018] hydromorphone HCl 500 MG/50ML SOLN Dilute 300mg in 300ml NS    1.2mg IV every 10 mins as needed    Dispense 1 bag (300mg = 30ml = 7 day supply) (Patient not taking: Reported on 11/27/2018)     [START ON 12/10/2018] hydromorphone HCl 500 MG/50ML SOLN Dilute 300mg in 300ml NS    1.2mg IV every 10 mins as needed    Dispense 1 bag (300mg = 30ml = 7 day supply) (Patient not taking: Reported on 11/27/2018)     hydromorphone HCl 500 MG/50ML SOLN Dilute 300mg in 300ml NS    1.2mg IV every 10 mins as needed    Dispense 1 bag (300mg = 30ml = 7 day supply) (Patient not taking: Reported on 11/27/2018)     hydromorphone HCl 500 MG/50ML SOLN Dilute 300mg in 300ml NS    1.2mg IV every 10 mins as needed    Dispense 1 bag (300mg = 30ml = 7 day supply) (Patient not taking: Reported on 11/27/2018)     hydromorphone HCl 500 MG/50ML SOLN Dilute 300mg in 300ml NS    1.2mg IV every 10 mins as needed    Dispense 1 bag (300mg = 30ml = 7 day supply) (Patient not taking: Reported on 11/27/2018)     hydromorphone HCl 500 MG/50ML SOLN Dilute 250mg in 250ml NS    0.9mg IV every 10 mins as needed    Dispense 1 bag (250mg = 25ml)     loperamide (IMODIUM) 2 MG capsule Take 2 mg by mouth as needed for diarrhea     LORazepam (ATIVAN) 0.5 MG tablet Take 1 tablet (0.5 mg) by mouth every 4 hours as needed (Anxiety, Nausea/Vomiting or  Sleep)     naloxone (NARCAN) nasal spray Spray 1 spray (4 mg) into one nostril alternating nostrils as needed for opioid reversal every 2-3 minutes until assistance arrives (Patient not taking: Reported on 11/13/2018)     ondansetron (ZOFRAN-ODT) 8 MG ODT tab Take 1 tablet (8 mg) by mouth every 8 hours as needed for nausea     order for DME Equipment being ordered: intermittent suction equipment for venting gtube     order for DME Equipment being ordered: home suction machine with tubing for connection to venting G-tube  Treatment Diagnosis: colon adenocarcinoma (Patient not taking: Reported on 12/6/2018)     parenteral nutrition - PTA/DISCHARGE ORDER Inject into the vein daily [facility]     prochlorperazine (COMPAZINE) 10 MG tablet Take 1 tablet (10 mg) by mouth every 6 hours as needed for nausea     sodium chloride 0.9% SOLN BOLUS Inject 1,000 mLs into the vein daily as needed For hydration.             Physical Exam:   Vitals were reviewed.  Blood pressure (!) 130/95, pulse 110, temperature 98.1  F (36.7  C), temperature source Oral, weight 68.6 kg (151 lb 3.2 oz), SpO2 100 %.    Constitutional: awake, laying in bed, appears comfortable, in NAD  HEENT: MMM, EOM intact, sclerae clear and anicteric  Heart: RRR, no murmurs appreciated  Lungs: Clear to auscultation bilaterally, breathing comfortably on RA, no wheezes, rhonchi  Abd: Firm to touch, mild generalized tenderness, BS+, no peritoneal signs  Skin: No rash or lesions on limited exam  Neuro: CN2-12 grossly intact, no lateralizing symptoms or focal neurologic deficits           Data:   No intake/output data recorded.    ROUTINE LABS (Last four results)  CMP  Recent Labs   Lab 12/09/18  0904      POTASSIUM 4.3   CHLORIDE 103   CO2 25   ANIONGAP 9   *   BUN 27   CR 0.57*   GFRESTIMATED >90   GFRESTBLACK >90   ANNAMARIE 8.6   PROTTOTAL 7.8   ALBUMIN 2.3*   BILITOTAL 0.5   ALKPHOS 158*   AST 23   ALT 28     CBC  Recent Labs   Lab 12/09/18  0904   WBC 12.1*   RBC  3.29*   HGB 8.3*   HCT 27.5*   MCV 84   MCH 25.2*   MCHC 30.2*   RDW 19.5*   *     INRNo lab results found in last 7 days.  Arterial Blood GasNo lab results found in last 7 days.

## 2018-12-09 NOTE — PHARMACY-ADMISSION MEDICATION HISTORY
Admission medication history interview status for the 12/9/2018 admission is complete. See Epic admission navigator for allergy information, pharmacy, prior to admission medications and immunization status.     Medication history interview sources:  Patient, Spouse, surescripts    Changes made to PTA medication list (reason)  Added: none  Deleted: hydromorphone IV soln duplicates, loperamide  Changed:   - multiple hydromorphone IV soln concentrations and doses --> 1250 mg in 250 mL NS, 8 mg/hr + 3.5 mg q15min prn    Additional medication history information (including reliability of information, actions taken by pharmacist):  - patient and spouse are good historians  - pt receives hydromorphone PCA from [facility] and brought own supply to hospital- currently using his own supply  - pt states he uses TPN from [facility] and administers at 8pm daily running for 12 hours, then off for 12 hours   - pt denies he is taking other medications than what is listed below      Prior to Admission medications    Medication Sig Last Dose Taking? Auth Provider   LORazepam (ATIVAN) 0.5 MG tablet Take 1 tablet (0.5 mg) by mouth every 4 hours as needed (Anxiety, Nausea/Vomiting or Sleep)  at prn Yes Lydia Beckham MD   naloxone (NARCAN) nasal spray Spray 1 spray (4 mg) into one nostril alternating nostrils as needed for opioid reversal every 2-3 minutes until assistance arrives  at prn Yes Sukhdeep Mota MD   NONFORMULARY Patient uses PCA hydromorphone HCL 1250 mg in 250 mL NS IV bag from [facility].  Regimen:  - 8 mg/hr  - 3.5 mg q15min prn  *patient brought home supply with him and is currently using. 12/9/2018 at Unknown time Yes Unknown, Entered By History   ondansetron (ZOFRAN-ODT) 8 MG ODT tab Take 1 tablet (8 mg) by mouth every 8 hours as needed for nausea  at prn Yes Albert Long MD   parenteral nutrition - PTA/DISCHARGE ORDER Inject into the vein daily From [facility]. Administers each night at 8 pm x  12 hours, then off for 12 hours. 12/8/2018 at 8pm  Yes Unknown, Entered By History   prochlorperazine (COMPAZINE) 10 MG tablet Take 1 tablet (10 mg) by mouth every 6 hours as needed for nausea  at prn Yes Lydia Beckham MD   sodium chloride 0.9% SOLN BOLUS Inject 1,000 mLs into the vein daily as needed For hydration.  at prn Yes Margarita Dougherty PA-C   order for DME Equipment being ordered: intermittent suction equipment for venting gtube   Lydia Beckham MD   order for DME Equipment being ordered: home suction machine with tubing for connection to venting G-tube  Treatment Diagnosis: colon adenocarcinoma   Laure Gr, APRN CNP         Medication history completed by: Corrine Kan, PharmD IV Student

## 2018-12-09 NOTE — ED NOTES
[facility] [place]   ED Nurse to Floor Handoff     Sebas Lopez is a 53 year old male who speaks English and lives with a spouse,  in a home  They arrived in the ED by car from home    ED Chief Complaint: Abdominal Pain and Nausea    ED Dx;   Final diagnoses:   Small bowel obstruction (H)         Needed?: No    Allergies:   Allergies   Allergen Reactions     Liquid Adhesive Rash     Dermabond, rash with pustules, occurred with abdominal surgery and port removal    .  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     Ulcerative colitis (H)       Baseline Mental status: WDL  Current Mental Status changes: at basesline    Infection present or suspected this encounter: no  Sepsis suspected: No  Isolation type: No active isolations     Activity level - Baseline/Home:  Independent  Activity Level - Current:   Independent    Bariatric equipment needed?: No    In the ED these meds were given:   Medications   ondansetron (ZOFRAN) injection 4 mg (4 mg Intravenous Given 12/9/18 0900)   0.9% sodium chloride BOLUS (not administered)   0.9% sodium chloride BOLUS (1,000 mLs Intravenous New Bag 12/9/18 0859)   HYDROmorphone (PF) (DILAUDID) injection 1 mg (1 mg Intravenous Given 12/9/18 0901)   HYDROmorphone (DILAUDID) injection 2 mg (2 mg Intravenous Given 12/9/18 0955)   sodium chloride (PF) 0.9% PF flush 73 mL (76 mLs Intravenous Given 12/9/18 1052)   iopamidol (ISOVUE-370) solution 92 mL (92 mLs Intravenous Given 12/9/18 1051)   HYDROmorphone (DILAUDID) injection 2 mg (2 mg Intravenous Given 12/9/18 1117)       Drips running?  Yes    Home pump  Yes PCA Dilaudid pump 8 mg hour with  capability for bumps up to three.    Current LDAs  Peripheral IV 09/07/18 Left;Anterior Upper forearm (Active)   Number of days: 93       PICC Double Lumen 10/23/18 Left Brachial vein medial (Active)   Number of days: 47       Gastrostomy/Enterostomy Gastrostomy LUQ 18 fr 18fr G-tube for venting (Active)   Number of days: 130       Ileostomy (Active)   Number of days: 368       Incision/Surgical Site 08/10/17 Right Chest (Active)   Number of days: 486       Incision/Surgical Site 12/06/17 Abdomen (Active)   Number of days: 368       Labs results:   Labs Ordered and Resulted from Time of ED Arrival Up to the Time of Departure from the ED   CBC WITH PLATELETS DIFFERENTIAL - Abnormal; Notable for the following components:       Result Value    WBC 12.1 (*)     RBC Count 3.29 (*)     Hemoglobin 8.3 (*)     Hematocrit 27.5 (*)     MCH 25.2 (*)     MCHC 30.2 (*)     RDW 19.5 (*)     Platelet Count 463 (*)     Absolute Neutrophil 9.3 (*)     Absolute Monocytes 1.7 (*)     All other components within normal limits   COMPREHENSIVE METABOLIC PANEL - Abnormal; Notable for the following components:    Glucose 109 (*)     Creatinine 0.57 (*)     Albumin 2.3 (*)     Alkaline Phosphatase 158 (*)     All other components within normal limits   LIPASE - Abnormal; Notable for the following components:    Lipase 63 (*)     All other components within normal limits   LACTIC ACID WHOLE BLOOD   ROUTINE UA WITH MICROSCOPIC   PERIPHERAL IV CATHETER   ISTAT CREATININE NURSING POCT   NASOGASTRIC TUBE DECOMPRESSION       Imaging Studies:   Recent Results (from the past 24 hour(s))   CT Abdomen Pelvis w Contrast    Narrative    EXAMINATION: CT ABDOMEN PELVIS W CONTRAST  12/9/2018 10:59 AM      PRELIMINARY REPORT - The following report is a preliminary  interpretation.     Impression    IMPRESSION: In this patient with known history of sigmoid  adenocarcinoma with peritoneal metastasis  1. Multiple dilated loops of small bowel proximal to the  right mid  abdomen ileostomy suggestive of small bowel obstruction. There is  percutaneous gastrotomy tube in place.  2. Overall there is worsening of disease progression with increasing  carcinomatosis and peritoneal tumor burden. For example significant  thickening of peritoneum and omentum implants, new hepatic capsule  scalloping, and enlarged mesenteric lymph nodes.  3. Mild bilateral hydronephrosis with hydroureter associated with new  perinephric fat stranding  4. Increased dilatation of intrahepatic and extrahepatic biliary  ductal system       Recent vital signs:   BP (!) 130/95   Pulse 110   Temp 98.1  F (36.7  C) (Oral)   Wt 68.6 kg (151 lb 3.2 oz)   SpO2 100%   BMI 22.33 kg/m      Cardiac Rhythm: Normal Sinus  Pt needs tele? No  Skin/wound Issues: None Has a g-tube and connector to low suctionx      Code Status: Comfort Care    Pain control: fair    Nausea control: good    Abnormal labs/tests/findings requiring intervention:     Family present during ED course? Yes wife  Family Comments/Social Situation comments:    Tasks needing completion: None at this time.    VINICIUS BABCOCK RN    [phone] [facility]

## 2018-12-09 NOTE — ED TRIAGE NOTES
Triage Assessment and Note    Vitals:    12/09/18 0708   BP: 142/88   Pulse: 110   Temp: 98.1  F (36.7  C)   TempSrc: Oral   SpO2: 100%   Weight: 68.6 kg (151 lb 3.2 oz)       Reason for visit: LLQ abdominal pain upset stomach and nausea. The pt has chronic pain however yesterday it became unbearable. Pt has vomited a 2-3 times. Pt denies diarrhea.       Background Information: Pt has a hx of colon cancer. He has a PICC line in the left arm with dilaudid boluses running.     Home remedies/Treatments: Pt states he tried to do the dilaudid pump q.15 minutes but it was not helping much.         Sindi Red RN   December 9, 2018

## 2018-12-09 NOTE — ED PROVIDER NOTES
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 Patient complains of abdominal pain, primarily localized to the left lower quadrant with associated nausea and 2-3 episodes of vomiting. He does feel as though he has increased abdominal distention. He has had decreased output from his ostomy. No fevers. Patient has a left upper extremity PICC line in place for IV Dilaudid infusions.  The patient does have a gastrostomy tube in place for decompression.  It typically will drain to gravity.  He has had continued output from the gastrostomy.  He did receive a pump for his gastrostomy tube to assist in decompression 4 days ago.  He states that they tried to hook it up yesterday but did not notice significant improvement in the gastrostomy output or his symptoms.  The patient and his wife express some uncertainty about the settings of the pump and how to use it.    I have reviewed the Medications, Allergies, Past Medical and Surgical History, and Social History in the [employer] system.  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     Ulcerative colitis (H)        Past Surgical History:   Procedure Laterality Date     COLONOSCOPY  2017     COLONOSCOPY N/A 11/30/2017    Procedure: COLONOSCOPY;  Colonoscopy;  Surgeon: Rob Dudley MD;  Location:  GI     COLONOSCOPY N/A 2/6/2018    Procedure: COMBINED COLONOSCOPY, STENT PLACEMENT;  COMBINED COLONOSCOPY with Colonic Stent  Placement;  Surgeon: Guru Lionel Mar MD;  Location: [facility] OR     COLONOSCOPY N/A 3/19/2018    Procedure: COMBINED COLONOSCOPY, STENT PLACEMENT;  flexible sigmoidoscopy with stent placement and dilation;  Surgeon: Alexis Rios MD;  Location: [facility] OR     COLONOSCOPY N/A 7/5/2018    Procedure: COMBINED COLONOSCOPY, STENT PLACEMENT;  flexible sigmoidoscopy with colonic stent placement ;  Surgeon: Alexis Rios MD;  Location: [facility] OR     GI SURGERY  07/10/2017    Extensive lysis of adhesions, small bowel resection with end ileostomy.      HERNIA REPAIR       INSERT PORT VASCULAR ACCESS Right 8/10/2017    Procedure: INSERT PORT VASCULAR ACCESS;  Single Lumen Right Chest Power Port;  Surgeon: Malena Andrew PA-C;  Location: [facility] OR     LAPAROSCOPY DIAGNOSTIC (GENERAL) N/A 12/6/2017    Procedure: LAPAROSCOPY DIAGNOSTIC (GENERAL);  Diagnostic Laparoscopy, Exploratory Laparotomy Anesthesia Block ;  Surgeon: Rob Dudley MD;  Location: [facility] OR     LAPAROTOMY EXPLORATORY N/A 12/6/2017    Procedure: LAPAROTOMY EXPLORATORY;;  Surgeon: Rob Dudley MD;  Location: [facility] OR     ORTHOPEDIC SURGERY Left     HIP ARTHROPLASTY     PICC INSERTION Right 02/23/2018    5Fr DL BioFlo PICC, 44cm (3cm external) in the R basilic vein w/ tip in the SVC RA junction       Family History   Problem Relation Age of Onset     Hypertension Father      Diabetes Father        Social History     Tobacco Use     Smoking status: Never Smoker     Smokeless tobacco: Never Used   Substance Use Topics     Alcohol use: Yes     Alcohol/week: 3.0 oz     Types: 5 Cans of beer per week     Comment: none for last 4 months       No current facility-administered medications for this encounter.      Current Outpatient Medications   Medication     dexamethasone (DECADRON) 2 MG tablet     [START ON 12/24/2018] hydromorphone HCl 500 MG/50ML SOLN     [START ON 12/17/2018] hydromorphone HCl 500 MG/50ML SOLN     [START ON  12/10/2018] hydromorphone HCl 500 MG/50ML SOLN     hydromorphone HCl 500 MG/50ML SOLN     hydromorphone HCl 500 MG/50ML SOLN     hydromorphone HCl 500 MG/50ML SOLN     hydromorphone HCl 500 MG/50ML SOLN     loperamide (IMODIUM) 2 MG capsule     LORazepam (ATIVAN) 0.5 MG tablet     naloxone (NARCAN) nasal spray     ondansetron (ZOFRAN-ODT) 8 MG ODT tab     order for DME     order for DME     parenteral nutrition - PTA/DISCHARGE ORDER     prochlorperazine (COMPAZINE) 10 MG tablet     sodium chloride 0.9% SOLN BOLUS        Allergies   Allergen Reactions     Liquid Adhesive Rash     Dermabond, rash with pustules, occurred with abdominal surgery and port removal        Review of Systems   Constitutional: Negative for fever.   HENT: Negative for sore throat.    Respiratory: Negative for shortness of breath.    Cardiovascular: Negative for chest pain.   Gastrointestinal: Positive for abdominal distention, abdominal pain, nausea and vomiting.   Genitourinary: Negative for dysuria.   All other systems reviewed and are negative.      Physical Exam   BP: 142/88  Pulse: 110  Temp: 98.1  F (36.7  C)  Weight: 68.6 kg (151 lb 3.2 oz)  SpO2: 100 %      Physical Exam   Constitutional: He appears well-developed and well-nourished. He appears distressed.   HENT:   Head: Normocephalic and atraumatic.   Eyes: Pupils are equal, round, and reactive to light.   Cardiovascular: Normal rate, regular rhythm and normal heart sounds.   Pulmonary/Chest: Effort normal and breath sounds normal.   Abdominal: Soft. He exhibits distension. There is tenderness.   Musculoskeletal: Normal range of motion.   Neurological: He is alert. He has normal strength. Gait normal.   Nursing note and vitals reviewed.      ED Course        Procedures       8:19 AM  The patient was seen and examined by Dr. Stewart in Room 18.   Results for orders placed or performed during the hospital encounter of 12/09/18   CT Abdomen Pelvis w Contrast    Narrative    EXAMINATION:  CT ABDOMEN PELVIS W CONTRAST  12/9/2018 10:59 AM      PRELIMINARY REPORT - The following report is a preliminary  interpretation.     Impression    IMPRESSION: In this patient with known history of sigmoid  adenocarcinoma with peritoneal metastasis  1. Multiple dilated loops of small bowel proximal to the right mid  abdomen ileostomy suggestive of small bowel obstruction. There is  percutaneous gastrotomy tube in place.  2. Overall there is worsening of disease progression with increasing  carcinomatosis and peritoneal tumor burden. For example significant  thickening of peritoneum and omentum implants, new hepatic capsule  scalloping, and enlarged mesenteric lymph nodes.  3. Mild bilateral hydronephrosis with hydroureter associated with new  perinephric fat stranding  4. Increased dilatation of intrahepatic and extrahepatic biliary  ductal system   CBC with platelets differential   Result Value Ref Range    WBC 12.1 (H) 4.0 - 11.0 10e9/L    RBC Count 3.29 (L) 4.4 - 5.9 10e12/L    Hemoglobin 8.3 (L) 13.3 - 17.7 g/dL    Hematocrit 27.5 (L) 40.0 - 53.0 %    MCV 84 78 - 100 fl    MCH 25.2 (L) 26.5 - 33.0 pg    MCHC 30.2 (L) 31.5 - 36.5 g/dL    RDW 19.5 (H) 10.0 - 15.0 %    Platelet Count 463 (H) 150 - 450 10e9/L    Diff Method Automated Method     % Neutrophils 76.5 %    % Lymphocytes 8.5 %    % Monocytes 14.3 %    % Eosinophils 0.3 %    % Basophils 0.2 %    % Immature Granulocytes 0.2 %    Nucleated RBCs 0 0 /100    Absolute Neutrophil 9.3 (H) 1.6 - 8.3 10e9/L    Absolute Lymphocytes 1.0 0.8 - 5.3 10e9/L    Absolute Monocytes 1.7 (H) 0.0 - 1.3 10e9/L    Absolute Eosinophils 0.0 0.0 - 0.7 10e9/L    Absolute Basophils 0.0 0.0 - 0.2 10e9/L    Abs Immature Granulocytes 0.0 0 - 0.4 10e9/L    Absolute Nucleated RBC 0.0    Comprehensive metabolic panel   Result Value Ref Range    Sodium 137 133 - 144 mmol/L    Potassium 4.3 3.4 - 5.3 mmol/L    Chloride 103 94 - 109 mmol/L    Carbon Dioxide 25 20 - 32 mmol/L    Anion Gap 9 3  - 14 mmol/L    Glucose 109 (H) 70 - 99 mg/dL    Urea Nitrogen 27 7 - 30 mg/dL    Creatinine 0.57 (L) 0.66 - 1.25 mg/dL    GFR Estimate >90 >60 mL/min/1.7m2    GFR Estimate If Black >90 >60 mL/min/1.7m2    Calcium 8.6 8.5 - 10.1 mg/dL    Bilirubin Total 0.5 0.2 - 1.3 mg/dL    Albumin 2.3 (L) 3.4 - 5.0 g/dL    Protein Total 7.8 6.8 - 8.8 g/dL    Alkaline Phosphatase 158 (H) 40 - 150 U/L    ALT 28 0 - 70 U/L    AST 23 0 - 45 U/L   Lipase   Result Value Ref Range    Lipase 63 (L) 73 - 393 U/L   Lactic acid whole blood   Result Value Ref Range    Lactic Acid 0.8 0.7 - 2.0 mmol/L      Medications   ondansetron (ZOFRAN) injection 4 mg (4 mg Intravenous Given 12/9/18 0900)   0.9% sodium chloride BOLUS (not administered)   0.9% sodium chloride BOLUS (1,000 mLs Intravenous New Bag 12/9/18 0859)   HYDROmorphone (PF) (DILAUDID) injection 1 mg (1 mg Intravenous Given 12/9/18 0901)   HYDROmorphone (DILAUDID) injection 2 mg (2 mg Intravenous Given 12/9/18 0955)   sodium chloride (PF) 0.9% PF flush 73 mL (76 mLs Intravenous Given 12/9/18 1052)   iopamidol (ISOVUE-370) solution 92 mL (92 mLs Intravenous Given 12/9/18 1051)   HYDROmorphone (DILAUDID) injection 2 mg (2 mg Intravenous Given 12/9/18 1117)           Critical Care time:             Labs Ordered and Resulted from Time of ED Arrival Up to the Time of Departure from the ED - No data to display         Assessments & Plan (with Medical Decision Making)   53 year old male with history of metastatic adenocarcinoma of the colon with recent decision to no longer pursue any treatment with his oncologist, Dr. Long, last week due to progression of disease presents to the emergency department with abdominal pain and distention.  He is on Dilaudid 8 mg/h infusion as well as 0.5 mg bumps.  The patient's evaluation is remarkable for a leukocytosis of 12,000 as well as a small bowel obstruction on his abdominal CT.  The patient does have a pump to utilize at home for gastrostomy tube  decompression but that was not effective for some reason yesterday when they attempted to utilize it.  The patient's gastrostomy tube was hooked up to wall suction here in the emergency department with good return of gastric contents.  He required several 2 mg doses of Dilaudid for symptom control.  The patient will be admitted to the oncology service for further management of his small bowel obstruction.  I have reviewed the nursing notes.    I have reviewed the findings, diagnosis, plan and need for follow up with the patient.       Review of your medicines      UNREVIEWED medicines. Ask your doctor about these medicines      Dose / Directions   dexamethasone 2 MG tablet  Commonly known as:  DECADRON      Refills:  0     * hydromorphone HCl 500 MG/50ML Soln  Used for:  Neoplasm related pain      Dilute 250mg in 250ml NS    0.9mg IV every 10 mins as needed    Dispense 1 bag (250mg = 25ml)  Quantity:  25 mL  Refills:  0     * hydromorphone HCl 500 MG/50ML Soln  Used for:  Neoplasm related pain      Dilute 300mg in 300ml NS    1.2mg IV every 10 mins as needed    Dispense 1 bag (300mg = 30ml = 7 day supply)  Quantity:  30 mL  Refills:  0     * hydromorphone HCl 500 MG/50ML Soln  Used for:  Neoplasm related pain      Dilute 300mg in 300ml NS    1.2mg IV every 10 mins as needed    Dispense 1 bag (300mg = 30ml = 7 day supply)  Quantity:  30 mL  Refills:  0     * hydromorphone HCl 500 MG/50ML Soln  Used for:  Neoplasm related pain      Dilute 300mg in 300ml NS    1.2mg IV every 10 mins as needed    Dispense 1 bag (300mg = 30ml = 7 day supply)  Quantity:  30 mL  Refills:  0     * hydromorphone HCl 500 MG/50ML Soln  Used for:  Neoplasm related pain      Start taking on:  12/10/2018  Dilute 300mg in 300ml NS    1.2mg IV every 10 mins as needed    Dispense 1 bag (300mg = 30ml = 7 day supply)  Quantity:  30 mL  Refills:  0     * hydromorphone HCl 500 MG/50ML Soln  Used for:  Neoplasm related pain      Start taking on:   12/17/2018  Dilute 300mg in 300ml NS    1.2mg IV every 10 mins as needed    Dispense 1 bag (300mg = 30ml = 7 day supply)  Quantity:  30 mL  Refills:  0     * hydromorphone HCl 500 MG/50ML Soln  Used for:  Neoplasm related pain      Start taking on:  12/24/2018  Dilute 300mg in 300ml NS    1.2mg IV every 10 mins as needed    Dispense 1 bag (300mg = 30ml = 7 day supply)  Quantity:  30 mL  Refills:  0     loperamide 2 MG capsule  Commonly known as:  IMODIUM      Dose:  2 mg  Take 2 mg by mouth as needed for diarrhea  Refills:  0     LORazepam 0.5 MG tablet  Commonly known as:  ATIVAN  Used for:  Peritoneal carcinomatosis (H), Cancer of sigmoid colon (H), Nausea and vomiting, intractability of vomiting not specified, unspecified vomiting type, Insomnia due to medical condition      Dose:  0.5 mg  Take 1 tablet (0.5 mg) by mouth every 4 hours as needed (Anxiety, Nausea/Vomiting or Sleep)  Quantity:  30 tablet  Refills:  5     naloxone 4 MG/0.1ML nasal spray  Commonly known as:  NARCAN  Used for:  Cancer of sigmoid colon (H), Long term current use of opiate analgesic      Dose:  4 mg  Spray 1 spray (4 mg) into one nostril alternating nostrils as needed for opioid reversal every 2-3 minutes until assistance arrives  Quantity:  0.2 mL  Refills:  0     ondansetron 8 MG ODT tab  Commonly known as:  ZOFRAN-ODT  Used for:  Nausea      Dose:  8 mg  Take 1 tablet (8 mg) by mouth every 8 hours as needed for nausea  Quantity:  60 tablet  Refills:  5     parenteral nutrition - PTA/DISCHARGE ORDER      Inject into the vein daily [facility]  Refills:  0     prochlorperazine 10 MG tablet  Commonly known as:  COMPAZINE  Used for:  Peritoneal carcinomatosis (H), Nausea and vomiting, intractability of vomiting not specified, unspecified vomiting type      Dose:  10 mg  Take 1 tablet (10 mg) by mouth every 6 hours as needed for nausea  Quantity:  90 tablet  Refills:  1     sodium chloride 0.9% Soln BOLUS      Dose:  1000 mL  Inject 1,000 mLs  into the vein daily as needed For hydration.  Quantity:  1000 mL  Refills:  0         * This list has 7 medication(s) that are the same as other medications prescribed for you. Read the directions carefully, and ask your doctor or other care provider to review them with you.            CONTINUE these medicines which have NOT CHANGED      Dose / Directions   order for DME  Used for:  Colon adenocarcinoma (H), Small bowel obstruction (H)      Equipment being ordered: home suction machine with tubing for connection to venting G-tube  Treatment Diagnosis: colon adenocarcinoma  Quantity:  1 Device  Refills:  0     order for DME  Used for:  Colon adenocarcinoma (H), Peritoneal carcinomatosis (H), Attention to G-tube (H)      Equipment being ordered: intermittent suction equipment for venting gtube  Quantity:  1 Units  Refills:  0            Final diagnoses:   None   Taryn PERRIN, am serving as a trained medical scribe to document services personally performed by Choco Stewart MD, based on the provider's statements to me.   Choco PERRIN MD, was physically present and have reviewed and verified the accuracy of this note documented by Taryn Minaya.      12/9/2018   [facility], [place], EMERGENCY DEPARTMENT     Choco Stewart MD  12/09/18 [id number]

## 2018-12-10 NOTE — PROGRESS NOTES
[facility], [place]    Hematology / Oncology Progress Note    Date of Admission: 12/9/2018  Hospital Day #: 1   Date of Service (when I saw the patient): 12/10/2018     Assessment & Plan   Sebas Lopez is a 53 year old male with PMHx significant for ulcerative colitis and metastatic colon cancer with peritoneal carcinomatosis s/p colonic stent, ileostomy, venting G-tube, TPN support and PCA pain control who presents for increased abdominal pain, nausea and vomiting. CT AP consistent with progressive disease and SBO.     #Recurrent SBO.   #Acute on chronic abdominal pain. 2/2 above.  #Peritoneal carcinomatosis.   #Ileostomy and venting G-tube.   Patient presents with increased nausea, vomiting and abdominal pain over the past few weeks. Unable to control abdominal pain with PTA PCA settings. He often had G-tube clamped but most recently had to have it open to gravity d/t pain. He purchased an electric pump to help with G-tube drainage but this was not helpful. He endorses decreased ileostomy output over the past few weeks as well. In ED, G tube placed to LIS with copious amounts of green liquid output and some mild symptomatic relief.   -CT AP in ED demonstrating multiple dilated small bowel loops proximal to end ileostomy, further disease progression including increased peritoneal tumor burden, new hepatic capsule scalloping and enlarged mesenteric LN, new mild bilateral hydronephrosis with perinephric fat stranding, and increased intra and extrahepatic biliary dilation.   -Continue G-tube to LIS. NPO status except for medications.   -Surg onc consult to evaluate for possible intervention, appreciate recs.   -Resume TPN in evening 12/10.   -Palliative care consult, appreciate recs.   -Zofran scheduled, Zyprexa 5 mg at bedtime, and dex 4 mg qAM. PRNs for breakthrough.   -PCA as follows: Basal rate at 10 mg (~25% increase) with 3.5 mg q15 minute bolus bumps. Awaiting Pall care recs  for further changes.   -For the past 24 hours PTA, patient's total continuous and PCA doses were 190.25 mg and 122.5 mg, respectively.     #Metastatic colon cancer.  KRAS wild type. Follows with Dr. Long. Has progressed through multiple therapies. Most recently on irenotecan and panitumumab (C3 9/21) however was found to have progression of disease on CT scan thus this was discontinued. Per Dr. Long's most recent note, unable to offer further chemotherapy options. Patient has been having hospice care discussions with palliative care but has not yet made a decision. Electing full code status.   -Palliative care consult as above.   -Hospice liason requested to come speak with patient and S.O. Leaning toward discharge to home hospice at this time. Further discussions pending.     #New mild B/L hydronephrosis.  #Mild perinephric fat stranding.  New mild B/L hydronephrosis on admission CT A/P is likely from increasing obstruction from progressive metastatic disease. Mild perinephric fat stranding is nonspecific and likely inflammatory given absence of clinical S/S of pyelonephritis and negative UTI.  - Monitor UOP.     #Chronic mild leucocytosis.  Likely related to progressive disease. No suspicion of infection at this time.   - Monitor CBC.      FEN  - NPO except for meds.   - Patient is on TPN 8PM although deferred resumption of TPN 12/9. Will resume 12/10 PM.  - PRN lyte replacement per standing protocol.     Prophylaxis   - Heparin subcutaneous TID for VTE ppx.                Code Status: FULL. Discussed and confirmed with patient.      Disposition: Pending improvement of current symptoms, achievement of pain control, and dispo planning, likely 2-3 day stay.     Discussed with staff attending, Dr. Hair.     Margarita Dougherty PA-C  Hematology/Oncology  Pager #[phone]    Interval History   Sebas was open to discussion regarding hospice. Was considering seeking a second opinion regarding cancer therapy and wanted to look into  naturopathic options, however is continuing to entertain hospice. Pain mildly improved in abdomen since admission however still quite bothersome. Endorses decreased ostomy output. Continues to have some fecal output rectally which has not changed. No nausea/vomting since admission. G tube to LIS.     Physical Exam   Temp: 98.3  F (36.8  C) Temp src: Oral BP: 127/86   Heart Rate: 94 Resp: 20 SpO2: 99 % O2 Device: None (Room air)    Vitals:    12/09/18 0708 12/10/18 1145   Weight: 68.6 kg (151 lb 3.2 oz) 64 kg (141 lb)     Vital Signs with Ranges  Temp:  [96.8  F (36  C)-98.8  F (37.1  C)] 98.3  F (36.8  C)  Heart Rate:  [78-94] 94  Resp:  [16-20] 20  BP: (127-142)/(80-89) 127/86  SpO2:  [98 %-100 %] 99 %  I/O last 3 completed shifts:  In: 20 [I.V.:20]  Out: 2000 [Urine:550; Emesis/NG output:1250; Stool:200]    Constitutional: Pleasant male seen sitting up in bed, in NAD, although appears mildly uncomfortable and changes positions frequently. Alert and interactive.   HEENT: NCAT, anicteric sclerae, conjunctiva clear. Moist mucous membranes.  Respiratory: Non-labored breathing, good air exchange. Lungs are clear to auscultation bilaterally, without wheezing, crackles or rhonchi. No cough noted.   Cardiovascular: Regular rate and rhythm with no murmur, rub or gallop.  GI: Absent bowel sounds. Abdomen is firm and tender to palpation rather diffusely. G tube to LIS draining green liquid output. Ileostomy in RUQ with sm amount of liquid yellow output. Midline abdominal scar well healed.  Skin: Warm and dry. No rashes or lesions on exposed surfaces.  Musculoskeletal: Extremities grossly normal. No tenderness or edema present.   Neurologic: A &O x3, speech normal, answering questions appropriately. Moves all extremities spontaneously. Grossly non-focal.  Neuropsychiatric: Mentation and affect normal/appropriate.  VAD: PICC is c/d/i with no erythema, drainage, or tenderness.    Medications   Current Facility-Administered  Medications   Medication     acetaminophen (TYLENOL) tablet 650 mg     dexamethasone (DECADRON) tablet 4 mg     dextrose 10 % 1,000 mL infusion     heparin sodium injection 5,000 Units     HYDROmorphone (PF) (DILAUDID) injection 1-2 mg     HYDROmorphone MAX CONC (DILAUDID) 300 mg/30mL infusion with bolus doses     lipids (INTRALIPID) 20 % infusion 250 mL     LORazepam (ATIVAN) tablet 0.5 mg     magnesium sulfate 4 g in 100 mL sterile water (premade)     Medication Instruction     naloxone (NARCAN) injection 0.1-0.4 mg     OLANZapine (zyPREXA) tablet 5 mg     ondansetron (ZOFRAN-ODT) ODT tab 8 mg     parenteral nutrition - ADULT compounded formula CYCLE     potassium chloride (KLOR-CON) Packet 20-40 mEq     potassium chloride 10 mEq in 100 mL sterile water intermittent infusion (premix)     potassium chloride 20 mEq in 50 mL intermittent infusion     potassium chloride ER (K-DUR/KLOR-CON M) CR tablet 20-40 mEq     potassium phosphate 15 mmol in D5W 250 mL intermittent infusion     potassium phosphate 20 mmol in D5W 250 mL intermittent infusion     potassium phosphate 20 mmol in D5W 500 mL intermittent infusion     potassium phosphate 25 mmol in D5W 500 mL intermittent infusion     prochlorperazine (COMPAZINE) tablet 10 mg    Or     prochlorperazine (COMPAZINE) injection 10 mg     sodium chloride 0.9% infusion       Data   CBC  Recent Labs   Lab 12/10/18  0627 12/09/18  0904   WBC 9.7 12.1*   RBC 3.53* 3.29*   HGB 8.9* 8.3*   HCT 29.5* 27.5*   MCV 84 84   MCH 25.2* 25.2*   MCHC 30.2* 30.2*   RDW 19.6* 19.5*    463*     CMP  Recent Labs   Lab 12/10/18  0627 12/09/18  0904    137   POTASSIUM 4.1 4.3   CHLORIDE 103 103   CO2 26 25   ANIONGAP 8 9   * 109*   BUN 14 27   CR 0.64* 0.57*   GFRESTIMATED >90 >90   GFRESTBLACK >90 >90   ANNAMARIE 8.9 8.6   MAG 1.9  --    PHOS 3.2  --    PROTTOTAL 7.9 7.8   ALBUMIN 2.2* 2.3*   BILITOTAL 0.9 0.5   ALKPHOS 154* 158*   AST 24 23   ALT 23 28     INRNo lab results found  in last 7 days.    Results for orders placed or performed during the hospital encounter of 12/09/18   CT Abdomen Pelvis w Contrast     Value    Radiologist flags Small bowel obstruction (Urgent)    Narrative    EXAMINATION: CT ABDOMEN PELVIS W CONTRAST  12/9/2018 10:59 AM      CLINICAL HISTORY: Abd pain, unspecified; Left-sided abdominal pain;  Left-sided abdominal pain    COMPARISON: Abd pain, unspecified; Left-sided abdominal pain;  Left-sided abdominal pain    PROCEDURE COMMENTS: CT of the abdomen was performed with 92cc of  isovue 370 intravenous and oral contrast. Coronal and sagittal  reformatted images were obtained.    FINDINGS:  Abdomen and pelvis:  Percutaneous gastrotomy tube in place. Stomach and upper GI small  bowels are dilated proximal to the right mid abdomen end ileostomy  measuring up to 4.8 cm in maximal diameter (series 3 image 250).  Overall the tumor burden seems significantly worse compares to  10/4/2018. For example the soft tissue thickness anterior to the small  bowel measures up to 6.2 cm, previously 2 cm the same level. There is  significant thickening of peritoneum and omentum with extensive  enlargement of mesenteric and retroperitoneal lymph nodes. Tumor  extension along the liver has also increased with scalloping of the  liver capsule, not seen on prior study (series 3 image 169).  Mesenteric lymphadenopathy (periaortic lymph node measures 1.1 cm  (series 2 image 105), increased compared to prior study. Postsurgical  changes of distal small bowel resection. Overlapping stents are  present within the rectum and sigmoid colon, stable in position.  Persistent opacification with heterogeneous density within the stent.    New scalloping of hepatic capsule. New dilatation of intrahepatic  ductal system. Common bile duct appears more dilated compared to prior  study now measuring up to 1.8 cm, previously 1.1 cm (series 4 image  55). Pancreas appears atrophic. Adrenal glands are  unremarkable. There  is bilateral hydronephrosis and hydroureter with compressed distal  ureters adjacent to the pelvic implants. Stable subcentimeter  hypodensity in the anterior mid right kidney. Urinary bladder is  mildly distended.    No infrarenal aorta aneurysm.    Lower thorax: New trace left pleural effusion.     Soft Tissue/Osseous structures: Multiple scattered sclerotic foci in  the medial left clavicle and ribs, not significant changed. Left-sided  rib fracture. Left total hip arthroplasty. Progress soft tissue  tumoral implants along the ventral midline abdomen.      Impression    IMPRESSION: In this patient with known history of sigmoid  adenocarcinoma with peritoneal metastasis  1. Multiple dilated loops of small bowel proximal to the right mid  abdomen ileostomy suggestive of small bowel obstruction. There is  percutaneous gastrotomy tube in place.  2. Overall there is worsening of disease progression with increasing  carcinomatosis and peritoneal tumor burden. For example significant  thickening of peritoneum and omentum implants, new hepatic capsule  scalloping, and enlarged mesenteric lymph nodes.  3. Mild bilateral hydronephrosis with hydroureter associated with new  perinephric fat stranding  4. Increased dilatation of intrahepatic and extrahepatic biliary  ductal system    [Urgent Result: Small bowel obstruction]    Finding was identified on 12/9/2018 11:00 AM.     Dr. Stewart was contacted by Dr. Rahman at 12/9/2018 11:10 AM and  verbalized understanding of the urgent finding.     I have personally reviewed the examination and initial interpretation  and I agree with the findings.    KARMEN ODONNELL MD

## 2018-12-10 NOTE — PLAN OF CARE
Patient continues with Dilaudid PCA (3.5mg bolus available every 15 min with 10mg continuous) with adequate relief.  Patient with high output from G-tube to LIS and minimal output from ileostomy.  Patient and significant other are now willing to discuss home hospice options.  Patient still wants TPN/IL for now and understands this may not be an option with home hospice.

## 2018-12-10 NOTE — CONSULTS
[facility], [place]    Palliative Care Consultation Note    Patient: Sebas Lopez  Date of Admission:  12/9/2018    Requesting provider/team:   Reason for consult: Pain management  Symptom management  Goals of care  Patient and family support    Recommendations:  Please see assessment below for rationale.    Pain:   -Please order an EKG to assess QTc for possible methadone initiation in the future.   -Opioid pain control: For now, continue hydromorphone PCA 10mg/hr continuous, 3.5mg IV q15prn. If patient has significant increase in pain overnight, ie consistently needing more than 4 prn doses/hr, suggest increasing continuous rate to 12mg/hr, continue prn at 3.5mg q15min prn.  -Agree with continuing dexamethasone, g-tube to suction.    Nausea: Better controlled today, continue ondansetron, zyprexa, compazine prn.     GOC: Patient and his wife are interested in enrolling in hospice after this admission likely with [facility] SW to discuss with them. Whether they are able to continue TPN would be a specific question for the agency.    These recommendations have been discussed with .    Thank you for the opportunity to participate in the care of this patient and family. Our team will follow Sebas. Please feel free to contact the on-call Palliative provider with any urgent needs.     Ashley Richardson  Pager: [phone]  [facility] Inpatient Team Consult pager [phone] (M-F 8-4:30)  After-hours Answering Service [phone]   Palliative Clinic: [phone]     Attestation:   Patient seen and evaluated with Dr. Richardson and I agree with findings/recs in this note.  Currently pain moderately controlled - discussed options including added methadone for better pain relief and to gradually increase as basal, as has had escalating pain needs in past 2 weeks and will likely continue to need further escalations.  At patient's request, discussed with  outpatient palliative Dr. Beckham, who also agreed methadone would be reasonable next step and likely will be needed.  Discussed starting low dose with decreased basal and monitor.  Patient reluctant to start today.  Will continue to discuss.  Currently reasonably controlled on 10mg/hr hydromorphone with ~2 PCA pushes/hour.  If requiring more frequent pushes, would increase basal and will rediscuss adding methadone.     Total time on the floor involved in the patient's care: 50 minutes. Greater than 50% of my time was spent counseling and/or coordination of care regarding symptoms and goals.   Thank you for involving us in the patient's care.   Tarah Martin MD  Palliative Medicine  Pager [phone]     Assessment & Plan   Sebas Lopez is a 53 year old male with peritoneal carcinomatosis from metastatic colon CA, admitted for increased pain/nausea from an SBO.    Symptoms:   Pain: on high doses of hydromorphone at admission, 8mg/hr continuous for the past week. Agree with starting dexamethasone. Today he is moderately controlled on hydromorphone 10mg/hr with 1-2 boluses/hr of 3.5mg IV. We are considering whether to initiate methadone treatment, for a continuous basal rate of pain relief longer term, and would likely keep hydromorphone PCA as prn. Please obtain EKG to assess QTc for likely initiation of methadone tomorrow. For now, would continue 10mg/hr rate and 3.5mg boluses. If he has increased needs overnight (as discussed above) may increase continuous rate.     -Nausea: improved, on scheduled ondansetron and zyprexa.     Social:       Living situation: With significant other Bessie       Support system: Bessie, other family and friends       Actual/Potential Caregiver: Bessie       Functional status: Independent       Financial concerns: None voiced  Mental Health: No concerns  Coping: Good  Spiritual/Jainism:    Spiritual background: raised Denominational  Spiritual needs: none discussed    Prognostic  Information: No remaining treatment options per oncology.  Advance Care Planning: No HCD on file. Full code.      History of Present Illness   Sources of History:patient, significant other gf Bessie and electronic health record  54yo male with progressive metastatic colon cancer and peritoneal carcinomatosis. He is s/p venting G-tube, ileostomy, but has had recurrent SBO's. Has had multiple lines of chemotherapy yet still had recurrent progression. 2 months ago his oncologist  did recommend considering symptom management with hospice, but pt wanted to pursue Regorafenib.    Was admitted 12/9/18 for another SBO. Had had 3-4 days of worsening diffuse constant abd pain, increasing nausea (no vomiting). Was started on hydromorphone 10mg IV constant with 3.5mg [id number] boluses, and scheduled zofran, zyprexa, dexamethasone.     This am at my visit he noticed mild improvement in pain, brief lasting minutes with boluses. Attributed it to medications as well as hospital suction (some question of whether home suction pump was effective). Nausea was generally better but during our interview he was having increasing pain despite boluses. At a visit in the afternoon he was feeling better, able to sleep but not feeling sedated.     We discussed hospice, which they state was recommended to them several times in the past. They have home palliative nursing services already and we discussed how it would be similar support, but a different focus. They are interested in pursuing this with their home palliative services provider, [facility], after discharge home. They are open to getting information about it in the hospital and setting up enrollment when they return home.     Medications:  PTA:  Fentanyl TD  Hydromorphone PCA, 8mg basal (since about 1 wk ago), 3.5mg boluses q10min prn    On admission:  Zofran 8mg ODT qAC, bedtime  Zyprexa 5mg at bedtime  Dexamethasone 4mg qam  Compazine prn 10mg po q6hrs prn  Lorazepam prn continued  0.5mg po q4hrs    ROS:  Palliative Symptom Review (0=no symptom/no concern, 1=mild, 2=moderate, 3=severe):  Pain: 3  Fatigue: 0  Nausea: 2  Constipation: 0  Diarrhea: 0  Depressive Symptoms: 0  Anxiety: 0  Drowsiness: 0  Poor Appetite: 0  Shortness of Breath: 0  Insomnia: 1  Delirium: 0  Other: 0  Overall (0 good/no concerns, 3 very poor): 3    Past Medical History    Reviewed per EMR    Past Surgical History   Reviewed per EMR    Social History   Reviewed per EMR    Family History   Noncontributory    Medications   I have reviewed this patient's medication profile and medications given in the past 24 hours.      Physical Exam   Vital Signs: Temp: 98.4  F (36.9  C) Temp src: Oral BP: 132/88   Heart Rate: 91 Resp: 20 SpO2: 100 % O2 Device: None (Room air)    Weight: 151 lbs 3.2 oz    Physical Exam:  Constitutional:  Frail chronically appearing male resting comfortably in bed,   HENT:  Normocephalic. MMM  Respiratory:  Normal breath sounds, No respiratory distress, No wheezing,  Cardiovascular:  Normal heart rate, Normal rhythm,     GI:  Distended, firm, diffuse mild ttp; gastrostomy tube in place to suction, Ileostomy in place with green yellow liquid drainage.   Musculoskeletal: No LE edema.  Integument:  Warm, Dry, no rash on visible skin  Neurologic:  Alert, attentive and appropriately oriented. Symmetric face, moving all 4 extremities  Psychiatric:  Mood and affect normal.       Data   Data reviewed:  ROUTINE IP LABS (Last four results)  BMP  Recent Labs   Lab 12/10/18  0627 12/09/18  0904    137   POTASSIUM 4.1 4.3   CHLORIDE 103 103   ANNAMARIE 8.9 8.6   CO2 26 25   BUN 14 27   CR 0.64* 0.57*   * 109*     CBC  Recent Labs   Lab 12/10/18  0627 12/09/18  0904   WBC 9.7 12.1*   RBC 3.53* 3.29*   HGB 8.9* 8.3*   HCT 29.5* 27.5*   MCV 84 84   MCH 25.2* 25.2*   MCHC 30.2* 30.2*   RDW 19.6* 19.5*    463*     INRNo lab results found in last 7 days.  Recent Results (from the past 24 hour(s))   CT Abdomen  Pelvis w Contrast    Impression    IMPRESSION: In this patient with known history of sigmoid  adenocarcinoma with peritoneal metastasis  1. Multiple dilated loops of small bowel proximal to the right mid  abdomen ileostomy suggestive of small bowel obstruction. There is  percutaneous gastrotomy tube in place.  2. Overall there is worsening of disease progression with increasing  carcinomatosis and peritoneal tumor burden. For example significant  thickening of peritoneum and omentum implants, new hepatic capsule  scalloping, and enlarged mesenteric lymph nodes.  3. Mild bilateral hydronephrosis with hydroureter associated with new  perinephric fat stranding  4. Increased dilatation of intrahepatic and extrahepatic biliary  ductal system    [Urgent Result: Small bowel obstruction]    Finding was identified on 12/9/2018 11:00 AM.     Dr. Stewart was contacted by Dr. Rahman at 12/9/2018 11:10 AM and  verbalized understanding of the urgent finding.     I have personally reviewed the examination and initial interpretation  and I agree with the findings.    KARMEN ODONNELL MD

## 2018-12-10 NOTE — CONSULTS
Surgical Oncology Consult Note     Patient name: Sebas Lopez  Date of Service: 12/10/2018  Reason for Consult: SBO   Requesting Physician: Dr. Laxmi Givens.      Assessment/Plan:   53M with widely metastatic colon cancer including diffuse peritoneal disease, s/p palliative colonic stent, ileostomy and venting G-tube, presenting with recurrent SBO.    - Patient is not a surgical candidate  - Agree with conservative measures, palliative care consult, consideration for hospice    Discussed with staff, Dr. Pleitez.    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 He is status post palliative sigmoid colonic stenting, palliative end-ileostomy and palliative gastrostomy for diversion of bowel obstruction. He has continued to have progression of metastatic colon cancer despite multiple rounds of chemotherapy, most recently irinotecan and panitumumab. He is on a home Dilaudid PCA pump as well as home suction as needed for his G-tube.    He presented to our ED on 12/9 for increasing abdominal pain with decrease in stoma stool output, with minimal relief from venting his G-tube to suction or gravity.     PMH:   Past Medical History:   Diagnosis Date     Adenocarcinoma of sigmoid colon (H)     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     Ulcerative colitis (H)      PSH:   Past Surgical History:   Procedure Laterality Date     COLONOSCOPY  2017     COLONOSCOPY N/A 11/30/2017    Procedure: COLONOSCOPY;  Colonoscopy;  Surgeon: Rob Dudley MD;  Location:  GI      COLONOSCOPY N/A 2/6/2018    Procedure: COMBINED COLONOSCOPY, STENT PLACEMENT;  COMBINED COLONOSCOPY with Colonic Stent Placement;  Surgeon: Guru Lionel Mar MD;  Location: [facility] OR     COLONOSCOPY N/A 3/19/2018    Procedure: COMBINED COLONOSCOPY, STENT PLACEMENT;  flexible sigmoidoscopy with stent placement and dilation;  Surgeon: Alexis Rios MD;  Location: [facility] OR     COLONOSCOPY N/A 7/5/2018    Procedure: COMBINED COLONOSCOPY, STENT PLACEMENT;  flexible sigmoidoscopy with colonic stent placement ;  Surgeon: Alexis Rios MD;  Location: [facility] OR     GI SURGERY  07/10/2017    Extensive lysis of adhesions, small bowel resection with end ileostomy.      HERNIA REPAIR       INSERT PORT VASCULAR ACCESS Right 8/10/2017    Procedure: INSERT PORT VASCULAR ACCESS;  Single Lumen Right Chest Power Port;  Surgeon: Malena Andrew PA-C;  [facility]     LAPAROSCOPY DIAGNOSTIC (GENERAL) N/A 12/6/2017    Procedure: LAPAROSCOPY DIAGNOSTIC (GENERAL);  Diagnostic Laparoscopy, Exploratory Laparotomy Anesthesia Block ;  Surgeon: Rob Dudley MD;  Location: [facility] OR     LAPAROTOMY EXPLORATORY N/A 12/6/2017    Procedure: LAPAROTOMY EXPLORATORY;;  Surgeon: Rob Dudley MD;  Location: [facility] OR     ORTHOPEDIC SURGERY Left     HIP ARTHROPLASTY     PICC INSERTION Right 02/23/2018    5Fr DL BioFlo PICC, 44cm (3cm external) in the R basilic vein w/ tip in the SVC RA junction     Meds:   Medications Prior to Admission   Medication Sig Dispense Refill Last Dose     LORazepam (ATIVAN) 0.5 MG tablet Take 1 tablet (0.5 mg) by mouth every 4 hours as needed (Anxiety, Nausea/Vomiting or Sleep) 30 tablet 5  at prn     naloxone (NARCAN) nasal spray Spray 1 spray (4 mg) into one nostril alternating nostrils as needed for opioid reversal every 2-3 minutes until assistance arrives 0.2 mL 0  at prn     NONFORMULARY Patient uses PCA hydromorphone HCL 1250 mg in 250 mL NS IV bag from  Home  [facility].  Regimen:  - 8 mg/hr  - 3.5 mg q15min prn  *patient brought home supply with him and is currently using.   12/9/2018 at Unknown time     ondansetron (ZOFRAN-ODT) 8 MG ODT tab Take 1 tablet (8 mg) by mouth every 8 hours as needed for nausea 60 tablet 5  at prn     parenteral nutrition - PTA/DISCHARGE ORDER Inject into the vein daily From [facility]. Administers each night at 8 pm x 12 hours, then off for 12 hours.   12/8/2018 at 8pm      prochlorperazine (COMPAZINE) 10 MG tablet Take 1 tablet (10 mg) by mouth every 6 hours as needed for nausea 90 tablet 1  at prn     sodium chloride 0.9% SOLN BOLUS Inject 1,000 mLs into the vein daily as needed For hydration. 1000 mL 0  at prn     order for DME Equipment being ordered: intermittent suction equipment for venting gtube 1 Units 0 Taking     order for DME Equipment being ordered: home suction machine with tubing for connection to venting G-tube  Treatment Diagnosis: colon adenocarcinoma 1 Device 0 Not Taking     Allergies:   Allergies   Allergen Reactions     Liquid Adhesive Rash     Dermabond, rash with pustules, occurred with abdominal surgery and port removal      FamHx:   Family History   Problem Relation Age of Onset     Hypertension Father      Diabetes Father      SocHx:   Social History     Socioeconomic History     Marital status: Single     Spouse name: Not on file     Number of children: Not on file     Years of education: Not on file     Highest education level: Not on file   Social Needs     Financial resource strain: Not on file     Food insecurity - worry: Not on file     Food insecurity - inability: Not on file     Transportation needs - medical: Not on file     Transportation needs - non-medical: Not on file   Occupational History     Not on file   Tobacco Use     Smoking status: Never Smoker     Smokeless tobacco: Never Used   Substance and Sexual Activity     Alcohol use: Yes     Alcohol/week: 3.0 oz     Types: 5 Cans of beer per week     Comment:  none for last 4 months     Drug use: No     Sexual activity: Not on file   Other Topics Concern     Parent/sibling w/ CABG, MI or angioplasty before 65F 55M? Not Asked   Social History Narrative     Not on file        ROS: 10-pt ROS negative except as noted above.     Objective:   /86 (BP Location: Right arm)   Pulse 110   Temp 98.3  F (36.8  C) (Oral)   Resp 20   Wt 64 kg (141 lb)   SpO2 99%   BMI 20.82 kg/m    General - no acute distress, comfortable  HEENT - normocephalic, atraumatic  Cardio - regular rate, regular rhythm  Pulm - non labored respirations on room air  Abdomen - distended and firm, mild nonfocal tenderness. G tube site clean. Ileostomy patent.  Neuro - moves all extremities without apparent deficit, non-focal  Extremities - no lower extremity edema, warm, well-perfused  Skin - no rash or bruising  Psych - age appropriate mental status / engagement     Labs:  WBC 9.7  Hgb 8.9  Cr 0.64      Imaging:  EXAMINATION: CT ABDOMEN PELVIS W CONTRAST  12/9/2018 10:59 AM                           IMPRESSION: In this patient with known history of sigmoid  adenocarcinoma with peritoneal metastasis  1. Multiple dilated loops of small bowel proximal to the right mid  abdomen ileostomy suggestive of small bowel obstruction. There is  percutaneous gastrotomy tube in place.  2. Overall there is worsening of disease progression with increasing  carcinomatosis and peritoneal tumor burden. For example significant  thickening of peritoneum and omentum implants, new hepatic capsule  scalloping, and enlarged mesenteric lymph nodes.  3. Mild bilateral hydronephrosis with hydroureter associated with new  perinephric fat stranding  4. Increased dilatation of intrahepatic and extrahepatic biliary  ductal system     [Urgent Result: Small bowel obstruction]     Finding was identified on 12/9/2018 11:00 AM.

## 2018-12-10 NOTE — PLAN OF CARE
00:00-07:00 am  AF slightly elevated BP at baseline OVSS  O2 sat 96-98% on room air  Abdominal pain adequately controlled with Dilaudid PCA at 10 mg/hr with 3.5 mg q 15 min prn and total 133.1 mg received during the night   No respiratory issues Continue on pulse ox. GT to LIS with 250 ml output  Keeps NPO  Compazine 10 mg given x 1 with relief  No emesis  Ileostomy patent with 200 ml output  Voiding well adequate UOP  Pt appears to be resting/sleeping comfortably  No acute events  Continue w/POC

## 2018-12-10 NOTE — PROGRESS NOTES
Social Work Services Progress Note    Hospital Day: 2  Date of Initial Social Work Evaluation:  N/A  Collaborated with:  Patient, , Pt's SO (Bessie - [phone]), Bedside RN    Data:  Pt is a 54 yo male with metastatic colon cancer.  SW received referral from RN that pt and pt's SO were requesting more information on hospice.    Intervention:  SW met with pt and Bessie at bedside.  Provided education on what hospice care is, what the general expectations are in a home and community setting, and addressed questions.  Pt explained that his preference is for hospice at home where he is currently residing with Bessie in [place].  She works from home, and his parents also live locally, so he is confident that family could support 24/7 cares at home.  Discussed the potential barriers with pt's current TPN and pain pump, and SW will discuss with hospice provider directly what they can/cannot allow.  Pt verified that he currently gets HHC with [facility] ([phone]), and has had visits with Palliative RN, Madhavi.  He stated that [facility] is is preference for hospice provider.  SW provided contact information, and expressed intent to meet with pt again tomorrow after speaking with [facility].    Pt reported that he does not currently have a HCD, has one at home, but not completed yet.  Pt aware that NOK are currently his parents.    PRINCESS spoke with intake at [facility] (P: [phone], F: [phone]) Hospice, and faxed over initial referral.  Team will review, and contact SW in AM on 12/11 regarding pt's situation and what they can accommodate in terms of TPN and pain management.    Assessment:  Pt engaged with SW, calm, and clearly expressed his desire to pursue hospice at home pending more information from [facility].    Plan:    Anticipated Disposition:  Home with services (hospice likely)    Barriers to d/c plan:  Medical stability and discharge plan    Follow Up:  SW will continue to follow for support and discharge  planning.    Tarah Crews, [facility]  7D Hematology/Oncology Social Worker  Phone: [phone]  Pager: [phone]

## 2018-12-10 NOTE — PROGRESS NOTES
Care Coordinator - Discharge Planning    Admission Date/Time:  12/9/2018  Attending MD:  Mary Hair MD     Data  Date of initial CC assessment:  12/10/18  Chart reviewed, discussed with interdisciplinary team.   Patient was admitted for:   1. Small bowel obstruction (H)    2. Cancer of sigmoid colon (H)         Assessment   Concerns with insurance coverage for discharge needs: None.  Current Living Situation: Patient lives with spouse.  Support System: Supportive and Involved  Services Involved: Home Care and Home Infusion  Transportation at Discharge: Car and Family or friend will provide  Barriers to Discharge: pending medical stability    Per MD team, patient will be ready for discontinue in a couple of days. He is currently open to [facility],(contact is Shira -[phone]) with weekly RN visits. He is also followed by \[facility]\"" for home TPN, liaison is aware of admission.  Patient may now be open to hospice (was not prior to today). If he should chose hospice, [facility] is able to provide unless he would choose a different agency.     Coordination of Care and Referrals: patient is currently open to Home Care and Home Infusion (resumption orders have not been placed as he may decide on hospice)..      Plan  Anticipated Discharge Date:  12/12/18  Anticipated Discharge Plan: Home with resumption of HC and HI VS. Hospice.    CTS Handoff completed:  No, will be done at the time of discharge    Lico Bourgeois, [facility] Care Coordinator

## 2018-12-10 NOTE — TELEPHONE ENCOUNTER
LATE ENTRY NOTE FROM 12/7/2018:    Spoke with patient and advised per MD that patient should try lowest possible setting. Should not increase suction from lowest setting even if nothing is coming out. Patient agreed. He plans to follow up in clinic Tuesday 12/11/2018.

## 2018-12-10 NOTE — PROGRESS NOTES
This is a recent snapshot of the patient's [facility] medical record.  For current drug dose and complete information and questions, call [phone] or In [facility] pool, fv home infusion ([id number])  CSN Number:  [id number]

## 2018-12-11 NOTE — PROGRESS NOTES
CLINICAL NUTRITION SERVICES - ASSESSMENT NOTE     Nutrition Prescription    RECOMMENDATIONS FOR MDs/PROVIDERS TO ORDER:  None at this time    Malnutrition Status:    Unable to determine at this time    Recommendations already ordered by Registered Dietitian (RD):  PN to continue as ordered for now with a modification of pt's home formula as follows: Goal CPN regimen =  goal volume 2400 ml/day (x 12 hrs) with 315 g Dex daily (1071 kcal), 100 g AA daily (400 kcal) and 250 ml of 20% IV lipids x 6 days per week =1900 kcals/day (30 kcal/kg/day), 1.6 g PRO/kg/day, GIR 3.4 with 23% kcals from Fat.  Dosing wt = 64 kg (lowest wt this admission).    Future/Additional Recommendations:  Discontinuation of PN depending on goals of care at discharge.     REASON FOR ASSESSMENT  Sebas Lopez is a/an 53 year old male assessed by the dietitian for Pharmacy/Nutrition to Start and Manage PN (noted pt had declined to have PN ordered on admission, but agreed to have it started on 12/10)    NUTRITION HISTORY  Pt known to Nutrition Services from previous admissions. Remains dependent on PN therapy (received through [facility]) d/t ongoing bowel obstruction with venting G tube. Home regimen as follows: Per 5 days per week, PN regimen consists of goal volume 2400 ml/day with 315 g Dex daily (1071 kcal), 100 g AA daily (400 kcal) and 250 ml of 20% IV lipids x 5 days = 2071 kcals/day (32 kcal/kg/day), 1.5 g PRO/kg/day, GIR 3.4 with 29% kcals from Fat.  (dosing wt = 65 kg)     Per 2 days per week, PN regimen consists of goal vol of 2700 ml, with 315 g Dex daily (1071 kcals), 100 g AA daily (400 kcal) and no lipids = 1471 kcals (23 kcal/kg/day), 1.5 g PRO/kg/day, GIR 3.4.    Per ED report, pt with h/o metastatic adenocarcinoma of the colon with recent decision to no longer pursue any treatment with his oncologist, Dr. Long, last week due to progression of disease presents to the emergency department with abdominal pain and distention. Noted to  "have a SBO on CT A/P.     CURRENT NUTRITION ORDERS  Diet: NPO since 12/10    LABS  Labs reviewed    MEDICATIONS  Medications reviewed    ANTHROPOMETRICS  Height: 0 cm (Data Unavailable). Height of 5'9\" (175.3 cm) per chart review.  Most Recent Weight: 65.182 kg (today's), 63.957 kg (12/10 - lowest wt this admission on 12/10)   IBW: 72.7 kg  BMI: Normal BMI  Weight History: Per review of EMR, no significant wt changes noted  Wt Readings from Last 10 Encounters:   12/11/18 65.2 kg (143 lb 11.2 oz)   12/06/18 68.6 kg (151 lb 4.8 oz)   11/27/18 66.7 kg (147 lb 2 oz)   11/13/18 67.6 kg (149 lb)   11/05/18 67.3 kg (148 lb 4.8 oz)   10/23/18 67 kg (147 lb 11.3 oz)   10/04/18 64.3 kg (141 lb 12 oz)   10/04/18 64.3 kg (141 lb 12.8 oz)   09/21/18 65.1 kg (143 lb 9.6 oz)   09/07/18 64.1 kg (141 lb 4.8 oz)       Dosing Weight: 64 kg (lowest wt this admission)    ASSESSED NUTRITION NEEDS  Estimated Energy Needs:8886-7156 kcals/day (25 - 30 kcals/kg)  Justification: Maintenance on PN  Estimated Protein Needs: 77-96 grams protein/day (1.2 - 1.5 grams of pro/kg)  Justification: Hypercatabolism with acute illness  Estimated Fluid Needs: (1 mL/kcal)   Justification: Maintenance    PHYSICAL FINDINGS  See malnutrition section below.  Unable to assess at this time d/t unable to see pt    MALNUTRITION  % Intake: Unable to assess  % Weight Loss: None noted  Subcutaneous Fat Loss: Unable to assess   Muscle Loss: Unable to assess  Fluid Accumulation/Edema: None noted per chart review  Malnutrition Diagnosis: Unable to determine due to unable to obtain diet hx and unable to complete nutritional physical assessment.    NUTRITION DIAGNOSIS  Inadequate protein-energy intake related to dependent on PN therapy, d/t ongoing SBO, but goal PN infusion not yet received  as evidenced by NPO status for po diet and PN therapy just resumed < 24 hrs ago.      INTERVENTIONS  Implementation  Nutrition Education: No education needs assessed at this time "   Collaboration with Pharm D regarding PN consult. Reviewed pt's home PN formula and decision made to modigy pt's home PN regimen while inpatient as follows; lipids changed to 250 ml x 6 days per week (to avoid wastage) and will continue with Dex of 315 g and AA of 100 g. Goal CPN regimen =  goal volume 2400 ml/day (x 12 hrs) with 315 g Dex daily (1071 kcal), 100 g AA daily (400 kcal) and 250 ml of 20% IV lipids x 6 days per week =1900 kcals/day (30 kcal/kg/day), 1.6 g PRO/kg/day, GIR 3.4 with 23% kcals from Fat.  Dosing wt = 64 kg (lowest wt this admission).  Collaboration with Provider regarding goal of care during interdisciplinary rounds today. Informed that no decision made at this time regarding transitioning to comfort cares and hence PN to continue for now.    Goals  Total avg nutritional intake to meet a minimum of 25 kcal/kg and 1.2 g PRO/kg daily (per dosing wt 64 kg).     Monitoring/Evaluation  Progress toward goals will be monitored and evaluated per protocol.    Tash Yip RD,LD  Unit pager [phone]

## 2018-12-11 NOTE — PLAN OF CARE
Patient states he's feeling better today & was able to ambulate in the halls this morning (couldn't ambulate yesterday until afternoon).  Continuing with NPO except meds; patient able to clamp/unclamp G-tube appropriately at correct time intervals.  Patient changed G-tube dressing with nurse supervision.  Methadone started this shift.  Dilaudid PCA still at 3.5mg bumps every 15 minutes with 10mg basal rate.  Working towards hospice; patient not quite there yet.  Patient understands surgery and further chemo is not an option.  Continuing with TPN/IL at this time.

## 2018-12-11 NOTE — PROGRESS NOTES
[facility], [place]    Hematology / Oncology Progress Note    Date of Admission: 12/9/2018  Hospital Day #: 2   Date of Service (when I saw the patient): 12/11/2018     Assessment & Plan   Sebas Lopez is a 53 year old male with PMHx significant for ulcerative colitis and metastatic colon cancer with peritoneal carcinomatosis s/p colonic stent, ileostomy, venting G-tube, TPN support and PCA pain control who presents for increased abdominal pain, nausea and vomiting. CT AP consistent with progressive disease and SBO.     #Recurrent SBO.   #Acute on chronic abdominal pain. 2/2 above.  #Peritoneal carcinomatosis.   #Ileostomy and venting G-tube.   Patient presents with increased nausea, vomiting and abdominal pain over the past few weeks. Unable to control abdominal pain with PTA PCA settings. He often had G-tube clamped but most recently had to have it open to gravity d/t pain. He purchased an electric pump to help with G-tube drainage but this was not helpful. He endorses decreased ileostomy output over the past few weeks as well. In ED, G tube placed to LIS with copious amounts of green liquid output and some mild symptomatic relief.   -CT AP in ED demonstrating multiple dilated small bowel loops proximal to end ileostomy, further disease progression including increased peritoneal tumor burden, new hepatic capsule scalloping and enlarged mesenteric LN, new mild bilateral hydronephrosis with perinephric fat stranding, and increased intra and extrahepatic biliary dilation.   -Continue G-tube to LIS. NPO status except for medications.   -Surg onc consult with no interventions to offer.   -Resume TPN in evening 12/10.   -Palliative care consult, appreciate recs. PCA settings at below with fairly good pain control. Start methadone 5 mg bid today per palliative recs.   --Monitor for respiratory depression, somnolence or other adverse effects of over sedation. If present, decrease basal  rate of PCA by half (from 10 mg/hr to 5 mg/hr).  -Zofran scheduled, Zyprexa 5 mg at bedtime, and dex 4 mg qAM. PRNs for breakthrough.   -PCA as follows: Basal rate at 10 mg (~25% increase) with 3.5 mg q15 minute bolus bumps. Awaiting [facility] care recs for further changes.   -For the past 24 hours PTA, patient's total continuous and PCA doses were 190.25 mg and 122.5 mg, respectively.   -Bowel sounds present today, will give another day of bowel rest and monitor Venting Gtube output, consider clears tomorrow.    #Metastatic colon cancer.  KRAS wild type. Follows with Dr. Long. Has progressed through multiple therapies. Most recently on irenotecan and panitumumab (C3 9/21) however was found to have progression of disease on CT scan thus this was discontinued. Per Dr. Long's most recent note, unable to offer further chemotherapy options. Patient has been having hospice care discussions with palliative care but has not yet made a decision. Electing full code status.   -Palliative care consult as above.   -Hospice liason requested to come speak with patient and S.O. Leaning toward discharge to home hospice at this time. Further discussions pending.     #New mild B/L hydronephrosis.  #Mild perinephric fat stranding.  New mild B/L hydronephrosis on admission CT A/P is likely from increasing obstruction from progressive metastatic disease. Mild perinephric fat stranding is nonspecific and likely inflammatory given absence of clinical S/S of pyelonephritis and negative UTI.   - Monitor UOP.     #Chronic mild leucocytosis.  Likely related to progressive disease. No suspicion of infection at this time.   - Monitor CBC.      FEN  - NPO except for meds.   - Patient is on TPN 8PM although deferred resumption of TPN 12/9. Will resume 12/10 PM.  - PRN lyte replacement per standing protocol.     Prophylaxis   - Heparin subcutaneous discontinued as patient declining, ambulates frequently.                Code Status: FULL. Discussed and  confirmed with patient.      Disposition: Pending improvement of current symptoms, achievement of pain control, and dispo planning, likely 2-3 day stay.     Discussed with staff attending, Dr. Hair.     Margarita Dougherty PA-C  Hematology/Oncology  Pager #[phone]    Interval History   Sebas states pain was well controlled yesterday afternoon, was a little less so overnight and continues to feel mildly uncomfortable this morning. He feels as though ostomy output increasing to baseline. Only mild nausea overnight relieved by antiemetics. Continues to discuss hospice care options with care team and expresses desire to further discuss with S.O.     Physical Exam   Temp: 97.4  F (36.3  C) Temp src: Oral BP: 130/81 Pulse: 95 Heart Rate: 98 Resp: 20 SpO2: 98 % O2 Device: None (Room air)    Vitals:    12/09/18 0708 12/10/18 1145 12/11/18 0832   Weight: 68.6 kg (151 lb 3.2 oz) 64 kg (141 lb) 65.2 kg (143 lb 11.2 oz)     Vital Signs with Ranges  Temp:  [97.4  F (36.3  C)-98.8  F (37.1  C)] 97.4  F (36.3  C)  Pulse:  [87-99] 95  Heart Rate:  [94-99] 98  Resp:  [18-20] 20  BP: (117-130)/(77-86) 130/81  SpO2:  [98 %-99 %] 98 %  I/O last 3 completed shifts:  In: 2510.4 [I.V.:600]  Out: 1975 [Emesis/NG output:1550; Stool:425]    Constitutional: Pleasant male seen sitting up in bed, in NAD. Alert and interactive.  HEENT: NCAT, anicteric sclerae, conjunctiva clear. Moist mucous membranes.  Respiratory: Non-labored breathing, good air exchange. Lungs are clear to auscultation bilaterally, without wheezing, crackles or rhonchi. No cough noted.   Cardiovascular: Regular rate and rhythm with no murmur, rub or gallop.  GI: Normoactive BS noted without stethoscope. Abdomen continues to be quite firm 2/2 cancer involvement, although a bit softer today. Tender to deeper palpation in lower quadrants, but this has improved as well. G tube clamped. Ileostomy in RUQ with sm amount of liquid yellow output. Midline abdominal scar well healed.  Skin:  Warm and dry. No rashes or lesions on exposed surfaces.  Musculoskeletal: Extremities grossly normal. No tenderness or edema present.   Neurologic: A &O x3, speech normal, answering questions appropriately. Moves all extremities spontaneously. Grossly non-focal.  Neuropsychiatric: Mentation and affect normal/appropriate.  VAD: PICC is c/d/i with no erythema, drainage, or tenderness.    Medications   Current Facility-Administered Medications   Medication     acetaminophen (TYLENOL) tablet 650 mg     dexamethasone (DECADRON) tablet 4 mg     dextrose 10 % 1,000 mL infusion     heparin sodium injection 5,000 Units     HYDROmorphone (PF) (DILAUDID) injection 1-2 mg     HYDROmorphone MAX CONC (DILAUDID) 300 mg/30mL infusion with bolus doses     lipids (INTRALIPID) 20 % infusion 250 mL     LORazepam (ATIVAN) tablet 0.5 mg     magnesium sulfate 4 g in 100 mL sterile water (premade)     Medication Instruction     naloxone (NARCAN) injection 0.1-0.4 mg     OLANZapine (zyPREXA) tablet 5 mg     ondansetron (ZOFRAN-ODT) ODT tab 8 mg     parenteral nutrition - ADULT compounded formula CYCLE     potassium chloride (KLOR-CON) Packet 20-40 mEq     potassium chloride 10 mEq in 100 mL sterile water intermittent infusion (premix)     potassium chloride 20 mEq in 50 mL intermittent infusion     potassium chloride ER (K-DUR/KLOR-CON M) CR tablet 20-40 mEq     potassium phosphate 15 mmol in D5W 250 mL intermittent infusion     potassium phosphate 20 mmol in D5W 250 mL intermittent infusion     potassium phosphate 20 mmol in D5W 500 mL intermittent infusion     potassium phosphate 25 mmol in D5W 500 mL intermittent infusion     prochlorperazine (COMPAZINE) tablet 10 mg    Or     prochlorperazine (COMPAZINE) injection 10 mg     saline flush for lab use ONLY     sodium chloride 0.9% infusion       Data   CBC  Recent Labs   Lab 12/11/18  0548 12/10/18  0627 12/09/18  0904   WBC 9.7 9.7 12.1*   RBC 3.33* 3.53* 3.29*   HGB 8.4* 8.9* 8.3*   HCT  28.0* 29.5* 27.5*   MCV 84 84 84   MCH 25.2* 25.2* 25.2*   MCHC 30.0* 30.2* 30.2*   RDW 19.7* 19.6* 19.5*    446 463*     CMP  Recent Labs   Lab 12/11/18  0548 12/10/18  0627 12/09/18  0904    137 137   POTASSIUM 4.2 4.1 4.3   CHLORIDE 103 103 103   CO2 26 26 25   ANIONGAP 7 8 9   * 105* 109*   BUN 25 14 27   CR 0.63* 0.64* 0.57*   GFRESTIMATED >90 >90 >90   GFRESTBLACK >90 >90 >90   ANNAMARIE 8.6 8.9 8.6   MAG 2.1 1.9  --    PHOS 2.8 3.2  --    PROTTOTAL 7.8 7.9 7.8   ALBUMIN 2.1* 2.2* 2.3*   BILITOTAL 0.6 0.9 0.5   ALKPHOS 139 154* 158*   AST 19 24 23   ALT 20 23 28     INRNo lab results found in last 7 days.    Results for orders placed or performed during the hospital encounter of 12/09/18   CT Abdomen Pelvis w Contrast     Value    Radiologist flags Small bowel obstruction (Urgent)    Narrative    EXAMINATION: CT ABDOMEN PELVIS W CONTRAST  12/9/2018 10:59 AM      CLINICAL HISTORY: Abd pain, unspecified; Left-sided abdominal pain;  Left-sided abdominal pain    COMPARISON: Abd pain, unspecified; Left-sided abdominal pain;  Left-sided abdominal pain    PROCEDURE COMMENTS: CT of the abdomen was performed with 92cc of  isovue 370 intravenous and oral contrast. Coronal and sagittal  reformatted images were obtained.    FINDINGS:  Abdomen and pelvis:  Percutaneous gastrotomy tube in place. Stomach and upper GI small  bowels are dilated proximal to the right mid abdomen end ileostomy  measuring up to 4.8 cm in maximal diameter (series 3 image 250).  Overall the tumor burden seems significantly worse compares to  10/4/2018. For example the soft tissue thickness anterior to the small  bowel measures up to 6.2 cm, previously 2 cm the same level. There is  significant thickening of peritoneum and omentum with extensive  enlargement of mesenteric and retroperitoneal lymph nodes. Tumor  extension along the liver has also increased with scalloping of the  liver capsule, not seen on prior study (series 3 image  169).  Mesenteric lymphadenopathy (periaortic lymph node measures 1.1 cm  (series 2 image 105), increased compared to prior study. Postsurgical  changes of distal small bowel resection. Overlapping stents are  present within the rectum and sigmoid colon, stable in position.  Persistent opacification with heterogeneous density within the stent.    New scalloping of hepatic capsule. New dilatation of intrahepatic  ductal system. Common bile duct appears more dilated compared to prior  study now measuring up to 1.8 cm, previously 1.1 cm (series 4 image  55). Pancreas appears atrophic. Adrenal glands are unremarkable. There  is bilateral hydronephrosis and hydroureter with compressed distal  ureters adjacent to the pelvic implants. Stable subcentimeter  hypodensity in the anterior mid right kidney. Urinary bladder is  mildly distended.    No infrarenal aorta aneurysm.    Lower thorax: New trace left pleural effusion.     Soft Tissue/Osseous structures: Multiple scattered sclerotic foci in  the medial left clavicle and ribs, not significant changed. Left-sided  rib fracture. Left total hip arthroplasty. Progress soft tissue  tumoral implants along the ventral midline abdomen.      Impression    IMPRESSION: In this patient with known history of sigmoid  adenocarcinoma with peritoneal metastasis  1. Multiple dilated loops of small bowel proximal to the right mid  abdomen ileostomy suggestive of small bowel obstruction. There is  percutaneous gastrotomy tube in place.  2. Overall there is worsening of disease progression with increasing  carcinomatosis and peritoneal tumor burden. For example significant  thickening of peritoneum and omentum implants, new hepatic capsule  scalloping, and enlarged mesenteric lymph nodes.  3. Mild bilateral hydronephrosis with hydroureter associated with new  perinephric fat stranding  4. Increased dilatation of intrahepatic and extrahepatic biliary  ductal system    [Urgent Result: Small  bowel obstruction]    Finding was identified on 12/9/2018 11:00 AM.     Dr. Stewart was contacted by Dr. Rahman at 12/9/2018 11:10 AM and  verbalized understanding of the urgent finding.     I have personally reviewed the examination and initial interpretation  and I agree with the findings.    KARMEN ODONNELL MD

## 2018-12-11 NOTE — PROGRESS NOTES
[facility], [place]    Palliative Care Progress Note    Patient: Sebas Lopez  Date of Admission:  12/9/2018    Recommendations:  Pain:   -Opioid pain control: Start methadone 5 mg BID.  For now, continue hydromorphone PCA 10mg/hr continuous, 3.5mg IV q15prn.  If gets drowsy on confused, would recommend decreasing basal rate on PCA to 5 mg/hr and can increase bolus frequency to q10 minute.    -Agree with continuing dexamethasone, g-tube to suction.     Nausea: Better controlled today, continue ondansetron, zyprexa, compazine prn.      GOC: Patient and his wife are interested in enrolling in hospice after this admission likely with their current home care agency [facility] to discuss with them. Whether they are able to continue TPN would be a specific question for the agency.    Assessment  Sebas Lopez is a 53 year old male with peritoneal carcinomatosis from metastatic colon CA, admitted for increased pain/nausea from an SBO.     Symptoms:   Pain: on high doses of hydromorphone at admission, with only moderate control on current PCA settings of 10 mg/hr hydromorphone and 3.5 mg bolus q15m.  Requiring 1-2 boluses/hour.  Does not feel controlled.  Discussed options of further increasing PCA vs adding methadone.  As he's had escalating hydromorphone doses over the past 2 weeks, he may be getting some hyperalgesia contributing, and may see limited response from increasing PCA further. Goal would be to start methadone low and decrease basal rate as levels build.  Currently using >3000 OMEs.  Discussed lowering basal rate now, but patient worried about losing current pain control.  Will start methadone and assess bolus use over next 24 hours, if decreasing, can decrease basal rate.  Will monitor closely for drowsiness or respiratory depression.       -Nausea: improved, on scheduled ondansetron and zyprexa.      Coping: Good    Prognostic Information: No remaining treatment options  per oncology.  Advance Care Planning: No HCD on file. Full code currently, agreeable to meeting with hospice.      These recommendations have been discussed with primary team.    Tarah Martin  Pager: [phone]  [facility] Inpatient Team Consult pager [phone] (M-F 8-4:30)  After-hours Answering Service [phone]   Palliative Clinic: [phone]       Interval History:      Feels pain mildly improved with increased continuous rate on PCA and drainage from G tube, but still says is 5-6.  Able to walk in halls yesterday, but does not feel like pain is well-controlled.  Denies nausea.  Sleeping OK.  No trouble breathing.       Medications:   I have reviewed this patient's medication profile and medications during this hospitalization    Dilaudid PCA:  10 mg/hr continuous, 3.5 mg bolus.  24 hours usage:  240 mg in continuous, 12 boluses completed at 1448, 12 boluses completed at 2253, and 12 boluses completed at 0555 = 126 mg in bolus (366 mg HM total, 3660 OMEs)    Zofran 8mg ODT qAC, bedtime  Zyprexa 5mg at bedtime  Dexamethasone 4mg qam  Compazine prn 10mg po q6hrs prn - none  Lorazepam prn continued 0.5mg po q4hrs - none        Review of Systems:   A comprehensive ROS has been negative other than stated in the HPI and below:   Palliative Symptom Review (0=no symptom/no concern, 1=mild, 2=moderate, 3=severe):     Pain: 2-3  Fatigue: 0  Nausea: 1-2  Constipation: 0  Diarrhea: 0  Depressive Symptoms: 0  Anxiety: 0  Drowsiness: 0  Poor Appetite: 0  Shortness of Breath: 0  Insomnia: 1  Delirium: 0  Other: 0  Overall (0 good/no concerns, 3 very poor): 3          Physical Exam:     Vital Signs: Temp: 97.2  F (36.2  C) Temp src: Oral BP: 123/81 Pulse: 98 Heart Rate: 98 Resp: 20 SpO2: 99 % O2 Device: None (Room air)    Weight: 143 lbs 11.2 oz    Physical Exam:  Constitutional:  Frail chronically appearing male resting comfortably in bed,   HENT:  Normocephalic. MMM  Respiratory:  Normal breath sounds, No respiratory distress,  No wheezing,  Cardiovascular:  Normal heart rate, Normal rhythm,     GI:  Distended, firm, diffuse mild ttp; gastrostomy tube in place to suction, Ileostomy in place with green yellow liquid drainage.   Musculoskeletal: No LE edema.  Integument:  Warm, Dry, no rash on visible skin  Neurologic:  Alert, attentive and appropriately oriented. Symmetric face, moving all 4 extremities  Psychiatric:  Mood and affect normal.     Data Reviewed:      Recent Labs   Lab Test 12/11/18  0548 12/10/18  0627    137   POTASSIUM 4.2 4.1   CHLORIDE 103 103   CO2 26 26   ANIONGAP 7 8   * 105*   BUN 25 14   CR 0.63* 0.64*   ANNAMARIE 8.6 8.9     WBC 9.7, Hb 8.4, Plt 427    EKG - QTc 444, sinus rhythm    Tarah Martin  Pager: [phone]  [facility] Inpatient Team Consult pager [phone] (M-F 8-4:30)  After-hours Answering Service [phone]  Palliative Clinic: [phone]     Total time spent was 30 minutes,  >50% of time was spent counseling and/or coordination of care regarding pain management.

## 2018-12-11 NOTE — PLAN OF CARE
AF, VSS.  TPN/IL started this diamante, Q 6 hr blood sugar checks started prior to starting feeding. Reports abdominalpain, but much improved with Dilauidid PCA . PCA settings:10 mg/hr basal rate and 3.5 mg ID's.  Using PCA independently. Refusing subcutaneous heparin but up and walking at least 4 times a day per pt report, saw pt up in halls x 2 this diamante. G-tubee to gravity drain for much of shift since pt up walking, placed to LIS suction at HS. Stable, making progress with pain control.

## 2018-12-11 NOTE — PROGRESS NOTES
VSS, afebrile. Pt c/o nausea and pain overnight Zofran given x1. PCA cartridge changed at end of shift, pharmacy to send a refill up. Refusing heparin. Illeostomy emptied with greenish output 100mL. Continue to monitor w/ POC.

## 2018-12-12 NOTE — PROGRESS NOTES
[facility], [place]    Hematology / Oncology Progress Note    Date of Admission: 12/9/2018  Hospital Day #: 3   Date of Service (when I saw the patient): 12/12/2018     Assessment & Plan   Sebas Lopez is a 53 year old male with PMHx significant for ulcerative colitis and metastatic colon cancer with peritoneal carcinomatosis s/p colonic stent, ileostomy, venting G-tube, TPN support and PCA pain control who presents for increased abdominal pain, nausea and vomiting. CT AP consistent with progressive disease and SBO.     #Recurrent SBO.   #Acute on chronic abdominal pain. 2/2 above.  #Peritoneal carcinomatosis.   #Ileostomy and venting G-tube.   Patient presents with increased nausea, vomiting and abdominal pain over the past few weeks. Unable to control abdominal pain with PTA PCA settings. He often had G-tube clamped but most recently had to have it open to gravity d/t pain. He purchased an electric pump to help with G-tube drainage but this was not helpful. He endorses decreased ileostomy output over the past few weeks as well. In ED, G tube placed to LIS with copious amounts of green liquid output and some mild symptomatic relief.   -CT AP in ED demonstrating multiple dilated small bowel loops proximal to end ileostomy, further disease progression including increased peritoneal tumor burden, new hepatic capsule scalloping and enlarged mesenteric LN, new mild bilateral hydronephrosis with perinephric fat stranding, and increased intra and extrahepatic biliary dilation.   -Continue G-tube to LIS. NPO status except for medications.   -Surg onc consult with no interventions to offer.   -Resume TPN in evening 12/10.   -Palliative care consult, appreciate recs. PCA settings below with fairly good pain control. Started methadone 5 mg bid (x12/11) per palliative recs.   --Monitor for respiratory depression, somnolence or other adverse effects of over sedation. If present, decrease basal  rate of PCA by half (from 10 mg/hr to 5 mg/hr).  -Zofran scheduled, Zyprexa 5 mg at bedtime, and dex 4 mg qAM. PRNs for breakthrough.   -PCA as follows: Basal rate at 10 mg (~25% increase) with 3.5 mg q15 minute bolus bumps. Awaiting [facility] care recs for further changes.   -For the past 24 hours PTA, patient's total continuous and PCA doses were 190.25 mg and 122.5 mg, respectively.   -Bowel sounds present 12/11-12, so diet advanced to clears. Ostomy output mildly increased/improved and G-tube output decreased.    #Metastatic colon cancer.  KRAS wild type. Follows with Dr. Long. Has progressed through multiple therapies. Most recently on irenotecan and panitumumab (C3 9/21) however was found to have progression of disease on CT scan thus this was discontinued. Per Dr. Long's most recent note, unable to offer further chemotherapy options. Patient has been having hospice care discussions with palliative care but has not yet made a decision. Electing full code status.   -Palliative care consult as above.   -Hospice liason requested to come speak with patient and S.O. Leaning toward discharge to home hospice at this time. Further discussions pending.     #New mild B/L hydronephrosis.  #Mild perinephric fat stranding.  New mild B/L hydronephrosis on admission CT A/P is likely from increasing obstruction from progressive metastatic disease. Mild perinephric fat stranding is nonspecific and likely inflammatory given absence of clinical S/S of pyelonephritis and negative UTI.   - Monitor UOP.     #Chronic mild leucocytosis.  Likely related to progressive disease. No suspicion of infection at this time.   - Monitor CBC.      FEN  - CLD  - Patient is on TPN 8PM although deferred resumption of TPN 12/9. Will resume 12/10 PM.  - PRN lyte replacement per standing protocol.     Prophylaxis   - Heparin subcutaneous discontinued as patient declining, ambulates frequently.                Code Status: FULL. Discussed and confirmed with  patient.      Disposition: Pending improvement of current symptoms, achievement of pain control, and dispo planning, likely additional 2-3 day stay.     Discussed with staff attending, Dr. Weber.    Margarita Dougherty PA-C  Hematology/Oncology  Pager #[phone]    Addendum:  Pt was seen and evaluated by me independently of the PA.  Reviewed labs and imaging and clinical course.  We had a long conversation regarding his overall disease.  He has metastatic colon cancer and has progressed through several lines of therapy.  Other options would linclude Lonsurf or Regorafenib, both of which are oral, and I don't think he can absorb.  Clinical trials - at this point with his performance status 2-3, I do not think he would qualify for these.    Reviewed what hospice is vs palliative care, etc.   He wants to talk to his family about this.    Will continue to discuss goals of care.    Gina Weber MD    Interval History   Sebas states pain improved with addition of methadone, especially overnight. Pain additionally gets better with activity. Feels as though abdomen is still quite distended. Denies nausea/vomiting. Offers no new complaints. Further discussed thoughts on hospice as well as code status. Patient thinking about these decisions.    Physical Exam   Temp: 97.3  F (36.3  C) Temp src: Oral BP: 138/87 Pulse: 80 Heart Rate: 97 Resp: 20 SpO2: 99 % O2 Device: None (Room air)    Vitals:    12/10/18 1145 12/11/18 0832 12/12/18 1130   Weight: 64 kg (141 lb) 65.2 kg (143 lb 11.2 oz) 65.3 kg (143 lb 15.4 oz)     Vital Signs with Ranges  Temp:  [96.9  F (36.1  C)-98.4  F (36.9  C)] 97.3  F (36.3  C)  Pulse:  [80-96] 80  Heart Rate:  [79-97] 97  Resp:  [18-20] 20  BP: (103-138)/(66-87) 138/87  SpO2:  [97 %-99 %] 99 %  I/O last 3 completed shifts:  In: 5154 [I.V.:600]  Out: 1510 [Urine:250; Emesis/NG output:1000; Stool:260]    Constitutional: Pleasant cachectic male seen sitting up in bed, in NAD. Alert and interactive. Seen ambulating in  halls frequently as well.  HEENT: NCAT, anicteric sclerae, conjunctiva clear. Moist mucous membranes.  Respiratory: Non-labored breathing, good air exchange. Lungs are clear to auscultation bilaterally, without wheezing, crackles or rhonchi. No cough noted.   Cardiovascular: Regular rate and rhythm with no murmur, rub or gallop.  GI: Normoactive BS present. Abdomen continues to be quite firm 2/2 cancer involvement, although a bit softer overall. Tender to deeper palpation in lower quadrants, but this has improved as well. G tube with green output. Ileostomy in RUQ with sm amount of liquid yellow output. Midline abdominal scar well healed.  Skin: Warm and dry. No rashes or lesions on exposed surfaces.  Musculoskeletal: Extremities grossly normal. No tenderness or edema present.   Neurologic: A &O x3, speech normal, answering questions appropriately. Moves all extremities spontaneously. Grossly non-focal.  Neuropsychiatric: Mentation and affect normal/appropriate.  VAD: PICC is c/d/i with no erythema, drainage, or tenderness.    Medications   Current Facility-Administered Medications   Medication     acetaminophen (TYLENOL) tablet 650 mg     dexamethasone (DECADRON) tablet 4 mg     dextrose 10 % 1,000 mL infusion     HYDROmorphone (PF) (DILAUDID) injection 1-2 mg     HYDROmorphone MAX CONC (DILAUDID) 300 mg/30mL infusion with bolus doses     lipids (INTRALIPID) 20 % infusion 250 mL     LORazepam (ATIVAN) tablet 0.5 mg     magnesium sulfate 4 g in 100 mL sterile water (premade)     Medication Instruction     methadone (DOLOPHINE) tablet 5 mg     naloxone (NARCAN) injection 0.1-0.4 mg     OLANZapine (zyPREXA) tablet 5 mg     ondansetron (ZOFRAN-ODT) ODT tab 8 mg     parenteral nutrition - ADULT compounded formula CYCLE     potassium chloride (KLOR-CON) Packet 20-40 mEq     potassium chloride 10 mEq in 100 mL sterile water intermittent infusion (premix)     potassium chloride 20 mEq in 50 mL intermittent infusion      potassium chloride ER (K-DUR/KLOR-CON M) CR tablet 20-40 mEq     potassium phosphate 15 mmol in D5W 250 mL intermittent infusion     potassium phosphate 20 mmol in D5W 250 mL intermittent infusion     potassium phosphate 20 mmol in D5W 500 mL intermittent infusion     potassium phosphate 25 mmol in D5W 500 mL intermittent infusion     prochlorperazine (COMPAZINE) tablet 10 mg    Or     prochlorperazine (COMPAZINE) injection 10 mg     saline flush for lab use ONLY       Data   CBC  Recent Labs   Lab 12/12/18  0615 12/11/18  0548 12/10/18  0627 12/09/18  0904   WBC 7.7 9.7 9.7 12.1*   RBC 3.11* 3.33* 3.53* 3.29*   HGB 7.8* 8.4* 8.9* 8.3*   HCT 26.0* 28.0* 29.5* 27.5*   MCV 84 84 84 84   MCH 25.1* 25.2* 25.2* 25.2*   MCHC 30.0* 30.0* 30.2* 30.2*   RDW 19.7* 19.7* 19.6* 19.5*    427 446 463*     CMP  Recent Labs   Lab 12/12/18  0615 12/11/18  0548 12/10/18  0627 12/09/18  0904    136 137 137   POTASSIUM 4.3 4.2 4.1 4.3   CHLORIDE 105 103 103 103   CO2 27 26 26 25   ANIONGAP 4 7 8 9   * 134* 105* 109*   BUN 28 25 14 27   CR 0.56* 0.63* 0.64* 0.57*   GFRESTIMATED >90 >90 >90 >90   GFRESTBLACK >90 >90 >90 >90   ANNAMARIE 8.5 8.6 8.9 8.6   MAG 2.0 2.1 1.9  --    PHOS 3.0 2.8 3.2  --    PROTTOTAL  --  7.8 7.9 7.8   ALBUMIN  --  2.1* 2.2* 2.3*   BILITOTAL  --  0.6 0.9 0.5   ALKPHOS  --  139 154* 158*   AST  --  19 24 23   ALT  --  20 23 28     INRNo lab results found in last 7 days.    Results for orders placed or performed during the hospital encounter of 12/09/18   CT Abdomen Pelvis w Contrast     Value    Radiologist flags Small bowel obstruction (Urgent)    Narrative    EXAMINATION: CT ABDOMEN PELVIS W CONTRAST  12/9/2018 10:59 AM      CLINICAL HISTORY: Abd pain, unspecified; Left-sided abdominal pain;  Left-sided abdominal pain    COMPARISON: Abd pain, unspecified; Left-sided abdominal pain;  Left-sided abdominal pain    PROCEDURE COMMENTS: CT of the abdomen was performed with 92cc of  isovue 370  intravenous and oral contrast. Coronal and sagittal  reformatted images were obtained.    FINDINGS:  Abdomen and pelvis:  Percutaneous gastrotomy tube in place. Stomach and upper GI small  bowels are dilated proximal to the right mid abdomen end ileostomy  measuring up to 4.8 cm in maximal diameter (series 3 image 250).  Overall the tumor burden seems significantly worse compares to  10/4/2018. For example the soft tissue thickness anterior to the small  bowel measures up to 6.2 cm, previously 2 cm the same level. There is  significant thickening of peritoneum and omentum with extensive  enlargement of mesenteric and retroperitoneal lymph nodes. Tumor  extension along the liver has also increased with scalloping of the  liver capsule, not seen on prior study (series 3 image 169).  Mesenteric lymphadenopathy (periaortic lymph node measures 1.1 cm  (series 2 image 105), increased compared to prior study. Postsurgical  changes of distal small bowel resection. Overlapping stents are  present within the rectum and sigmoid colon, stable in position.  Persistent opacification with heterogeneous density within the stent.    New scalloping of hepatic capsule. New dilatation of intrahepatic  ductal system. Common bile duct appears more dilated compared to prior  study now measuring up to 1.8 cm, previously 1.1 cm (series 4 image  55). Pancreas appears atrophic. Adrenal glands are unremarkable. There  is bilateral hydronephrosis and hydroureter with compressed distal  ureters adjacent to the pelvic implants. Stable subcentimeter  hypodensity in the anterior mid right kidney. Urinary bladder is  mildly distended.    No infrarenal aorta aneurysm.    Lower thorax: New trace left pleural effusion.     Soft Tissue/Osseous structures: Multiple scattered sclerotic foci in  the medial left clavicle and ribs, not significant changed. Left-sided  rib fracture. Left total hip arthroplasty. Progress soft tissue  tumoral implants along the  ventral midline abdomen.      Impression    IMPRESSION: In this patient with known history of sigmoid  adenocarcinoma with peritoneal metastasis  1. Multiple dilated loops of small bowel proximal to the right mid  abdomen ileostomy suggestive of small bowel obstruction. There is  percutaneous gastrotomy tube in place.  2. Overall there is worsening of disease progression with increasing  carcinomatosis and peritoneal tumor burden. For example significant  thickening of peritoneum and omentum implants, new hepatic capsule  scalloping, and enlarged mesenteric lymph nodes.  3. Mild bilateral hydronephrosis with hydroureter associated with new  perinephric fat stranding  4. Increased dilatation of intrahepatic and extrahepatic biliary  ductal system    [Urgent Result: Small bowel obstruction]    Finding was identified on 12/9/2018 11:00 AM.     Dr. Stewart was contacted by Dr. Rahman at 12/9/2018 11:10 AM and  verbalized understanding of the urgent finding.     I have personally reviewed the examination and initial interpretation  and I agree with the findings.    KARMEN ODONNELL MD

## 2018-12-12 NOTE — PROGRESS NOTES
Social Work Services Progress Note    Hospital Day: 4  Date of Initial Social Work Evaluation:  N/A  Collaborated with:  Patient, [facility] ([phone]), [facility] ([phone]), [facility] (Liaison, Aileen, and Intake, Maru - [phone]), Hem/Onc ELIF, Palliative    Data:  Pt is a 54 yo male with metastatic colon cancer.  SW following for discharge planning and pt is considering transitioning to hospice cares.    Intervention:  SW spoke with [facility] regarding pt's options for care at home.  [facility], and if pt chooses to stay on TPN, they will continue to provide HC, including Palliative care, but pt can only switch to hospice cares when he is ready to stop the TPN.  [facility] stated that pt Palliative APN will continue to follow up on GOC discussions if pt is unwilling to discharge with hospice after this hospitalization.    PRINCESS spoke with liaison and intake at [facility], sent requested clinical information, and they have approved pt for home hospice with TPN and current pain management plan.  SW informed that pt has not made a decision on provider yet, but will inform pt of this option.  [facility] willing to move forward with discharge planning whenever/if pt is ready from [facility].    PRINCESS met with pt several times to discuss his wishes and goals when he returns home.  Pt tearful at times when discussing wanting to spend time with his nephew and family.  Pt clearly stated that he wants to remain on TPN.  Pt talked about how much he likes his care with [facility], and was hopeful that he could stay with this provider for hospice.  Pt stated that he considered the option of [facility] as good news, but not willing to make a decision at this time.  He described some concerns switching providers, and wanting to discuss choices with his parents who will not be at the hospital prior to pt's discharge.  He did say that he talked with his mother today  who is currently in [place]. SW offered to call his SO, Bessie, to explain options, and pt declined stating that he would rather call himself.    Assessment:  Pt seems more willing to consider hospice care at home, but still appears to be having difficulty with this transition.  Pt's affect appears appropriate to situation, but he does seem to feel burdened by decision to accept hospice care as a discharge option, and is struggling to do so without his family members present.    Plan:    Anticipated Disposition:  Home with services - Palliative or hospice care pending pt's decision    Barriers to d/c plan:  Medical stability and discharge planning    Follow Up:  SW will continue to follow for support and discharge planning.    Tarah Crews [address] Hematology/Oncology Social Worker  Phone: [phone]  Pager: [phone]

## 2018-12-12 NOTE — PROGRESS NOTES
VSS, afebrile. Pt denies nausea/SOB. Pain well controlled with PCA pump. Pt up and ambulating in the hallway overnight. G-tube to low suction with small amt of green output. Lipids and cycled TPN currently running. PCA shift total cleared. NS 75ml/hr. Continue to monitor w/ POC.

## 2018-12-12 NOTE — PROGRESS NOTES
[facility], [place]    Palliative Care Progress Note    Patient: Sebas Lopez  Date of Admission:  12/9/2018    Recommendations:  -Pain: continue PCA continuous and prn dosing. Continue methadone at current dosing 5mg po BID.  -Nausea: Continue current regimen.   -Disposition: continue working with SW to determine where pt could transfer to, with consideration that he would like to continue TPN and will likely continue to require PCA.    Assessment & Plan   Sebas Lopez is a 53 year old male with peritoneal carcinomatosis from metastatic colon CA, admitted for increased pain/nausea from an SBO.    -Pain: Started methadone 5mg po BID, continued hydromorphone 10mg continuous, 3.5mg boluses q15min prn. Using about 3 hydromorphone boluses/hr. No drowsiness. Will continue current regimen for another 24hrs then likely decrease basal rate.    -Nausea: improved on current regimen.     GOC: Still awaiting SW to assess what outpatient hospice options are available.    These recommendations have been discussed with .    Ashley Richardson  Pager: [phone]  [facility] Inpatient Team Consult pager [phone] (M-F 8-4:30)  After-hours Answering Service [phone]   Main Palliative Clinic - [facility] 1C: [phone]     Attestation:   Patient seen and evaluated with Dr. Richardson  and I agree with findings/recs in this note.   Pain better controlled.  Discussed further hospice - SW attempting to find hospice that will accept PCA and TPN.  His present home care company not able to do TPN on hospice.  He feels he has a good quality of life on TPN so long as pain is controlled.  Is wanting to wait to talk to his parents and Bessie to decide on hospice.  Discussed why setting up with hospice would be recommended prior to leaving, to ensure medication management when gets home.  Also discussed resuscitation - said did not think he would want, but did not feel able to make decision yet, would not want  code status changed today.    Total time on the floor involved in the patient's care: 30 minutes. Greater than 50% of my time was spent counseling and/or coordination of care regarding symptoms and goals.   Thank you for involving us in the patient's care.   Tarah Martin MD / Palliative Medicine / Pager [phone] / After-Hours Answering Service [phone] / Main Palliative Clinic - [facility] [phone] / [facility] Inpatient Team Consult Pager [phone] (answered 8am-430pm M-F)      History of Present Illness   Improved pain overall. Still struggling with nighttime discomfort. No sleepiness. Still having good ileostomy output. No new concerns.    Medications   I have reviewed this patient's medication profile and medications given in the past 24 hours.     Decadron 4 mg daily  Hydromorphone 10 mg/hr, 3.5 mg bolus - using 2-3/hr based on last 4 hours, per nursing documentation 31 boluses/24 hours.  240 contin + 108.5 bolus = 348.5 mg total  Methadone 5 mg BID  Olanzapine 5 mg QHS  Zofran 8 mg QID    Review of Systems   Palliative Symptom Review (0=no symptom/no concern, 1=mild, 2=moderate, 3=severe):  Pain: 2  Fatigue: 0  Nausea: 0  Constipation: 0  Diarrhea: 0  Depressive Symptoms: 0  Anxiety: 0  Drowsiness: 0  Poor Appetite: 0  Shortness of Breath: 0  Insomnia: 0  Delirium: 0  Other: 0  Overall (0 good/no concerns, 3 very poor): 2    Physical Exam   Vital Signs: Temp: 97.7  F (36.5  C) Temp src: Oral BP: 124/84 Pulse: 80 Heart Rate: 94 Resp: 18 SpO2: 97 % O2 Device: None (Room air)    Weight: 143 lbs 15.37 oz    Physical Exam:  Constitutional:  Frail patient in bed, no distress   HENT:  Normocephalic. MMM  Respiratory:  Normal breath sounds, No respiratory distress, No wheezing,  Cardiovascular:  Normal heart rate, Normal rhythm,     GI:  Distended, firm, diffusely tender, BS present, G tube to suction draining green liquid.   Musculoskeletal: No BLE edema.  Integument:  Warm, Dry, no rash on  visible skin  Neurologic:  Alert, attentive and appropriately oriented. Symmetric face, moving all 4 extremities  Psychiatric:  Mood and affect normal.       Data reviewed today:   ROUTINE IP LABS (Last four results)  BMP  Recent Labs   Lab 12/12/18  0615 12/11/18  0548 12/10/18  0627 12/09/18  0904    136 137 137   POTASSIUM 4.3 4.2 4.1 4.3   CHLORIDE 105 103 103 103   ANNAMARIE 8.5 8.6 8.9 8.6   CO2 27 26 26 25   BUN 28 25 14 27   CR 0.56* 0.63* 0.64* 0.57*   * 134* 105* 109*     CBC  Recent Labs   Lab 12/12/18  0615 12/11/18  0548 12/10/18  0627 12/09/18  0904   WBC 7.7 9.7 9.7 12.1*   RBC 3.11* 3.33* 3.53* 3.29*   HGB 7.8* 8.4* 8.9* 8.3*   HCT 26.0* 28.0* 29.5* 27.5*   MCV 84 84 84 84   MCH 25.1* 25.2* 25.2* 25.2*   MCHC 30.0* 30.0* 30.2* 30.2*   RDW 19.7* 19.7* 19.6* 19.5*    427 446 463*     INRNo lab results found in last 7 days.

## 2018-12-12 NOTE — PLAN OF CARE
[id number]:VSS, Afebrile. Talked w/ pt about hospice. He is considering. PCA cartridge changed & pump cleared. Greenish output, documented. Denies n/v.  Continue to monitor & POC.

## 2018-12-13 NOTE — PLAN OF CARE
"  Patient feeling more pain & anxiety today.  IV Compazine 10mg & 0.5mg PO Ativan given along with scheduled ODT Zofran.  Patient frequently ambulating halls & vomited in a garbage can in the rivera just prior to receiving the Compazine.  Patient also emptying the najera bag attached to his G-tube for gravity, so very difficult today to determine output as he empties G-tube contents into toilet directly.  Patient states he's not taking more by mouth though he is on clear liquids.  Maalox & Protonix added for heartburn.  PCA pump beeping a lot today, so pump changed out (why there are 2 \"shift totals\" charted on EMAR).  Patient's total PCA usage this shift is as follows:  116.1mg total given, 10 PCA bolus doses, 4 Partial, 2 Denied).  Patient also on Methadone, but dose cannot be increased until the third day of patient receiving it, which will be tomorrow.  Patient continues to contemplate changing code status to DNR/DNI.  Care conference with patientSCOTT, Palliative Care & Social work today at 1530.  "

## 2018-12-13 NOTE — PROGRESS NOTES
[facility], [place]    Hematology / Oncology Progress Note    Date of Admission: 12/9/2018  Hospital Day #: 4   Date of Service (when I saw the patient): 12/13/2018     Assessment & Plan   Sebas Lopez is a 53 year old male with PMHx significant for ulcerative colitis and metastatic colon cancer with peritoneal carcinomatosis s/p colonic stent, ileostomy, venting G-tube, TPN support and PCA pain control who presents for increased abdominal pain, nausea and vomiting. CT AP consistent with progressive disease and SBO.     #Recurrent SBO.   #Acute on chronic abdominal pain. 2/2 above.  #Peritoneal carcinomatosis.   #Ileostomy and venting G-tube.   Patient presents with increased nausea, vomiting and abdominal pain over the past few weeks. Unable to control abdominal pain with PTA PCA settings. He often had G-tube clamped but most recently had to have it open to gravity d/t pain. He purchased an electric pump to help with G-tube drainage but this was not helpful. He endorses decreased ileostomy output over the past few weeks as well. In ED, G tube placed to LIS with copious amounts of green liquid output and some mild symptomatic relief.   -CT AP in ED demonstrating multiple dilated small bowel loops proximal to end ileostomy, further disease progression including increased peritoneal tumor burden, new hepatic capsule scalloping and enlarged mesenteric LN, new mild bilateral hydronephrosis with perinephric fat stranding, and increased intra and extrahepatic biliary dilation.   -Continue G-tube to LIS. NPO status except for medications.   -Surg onc consult with no interventions to offer.   -Resume TPN in evening 12/10.   -Palliative care consult, appreciate recs. PCA settings below with moderate pain control. Started methadone 5 mg bid (x12/11) per palliative recs. Continues to use quite a bit of bolus doses, although may be decreasing slightly. May consider decreased basal rate today  per palliative.   --Monitor for respiratory depression, somnolence or other adverse effects of over sedation. If present, decrease basal rate of PCA by half (from 10 mg/hr to 5 mg/hr).  -Zofran scheduled, Zyprexa 5 mg at bedtime, and dex 4 mg qAM. PRNs for breakthrough.   -PCA as follows: Basal rate at 10 mg (~25% increase) with 3.5 mg q15 minute bolus bumps. Awaiting [facility] care recs for further changes.   -For the past 24 hours PTA, patient's total continuous and PCA doses were 190.25 mg and 122.5 mg, respectively.   -Bowel sounds present 12/11-12, so diet advanced to clears. Ostomy output mildly increased/improved and G-tube output decreased. 12/13 bowel sounds quiet today with mild increase in distension and discomfort. Ostomy output has increased some, and venting G tube output decreased. Continue clears for comfort with venting G to LIS/to gravity at all times.     #Metastatic colon cancer.  KRAS wild type. Follows with Dr. Long. Has progressed through multiple therapies. Most recently on irenotecan and panitumumab (C3 9/21) however was found to have progression of disease on CT scan thus this was discontinued. Per Dr. Long's most recent note, unable to offer further chemotherapy options. Patient has been having hospice care discussions with palliative care but has not yet made a decision. Electing full code status.   -Palliative care consult as above.   -Hospice liason requested to come speak with patient and S.O. Leaning toward discharge to home hospice at this time. Further discussions pending.     #New mild B/L hydronephrosis.  #Mild perinephric fat stranding.  New mild B/L hydronephrosis on admission CT A/P is likely from increasing obstruction from progressive metastatic disease. Mild perinephric fat stranding is nonspecific and likely inflammatory given absence of clinical S/S of pyelonephritis and negative UTI.   - Monitor UOP.     #Chronic mild leucocytosis.  Likely related to progressive disease. No  suspicion of infection at this time.   - Monitor CBC.      FEN  - CLD as tolerated for comfort.  - Patient is on TPN 8PM although deferred resumption of TPN 12/9. Resumed 12/10 PM.  - PRN lyte replacement per standing protocol.     Prophylaxis   - Heparin subcutaneous discontinued as patient declining, ambulates frequently.                Code Status: FULL. Discussed and confirmed with patient.      Disposition: Pending improvement of current symptoms, achievement of pain control, and dispo planning, likely additional 2-3 day stay. Possible discharge tomorrow or over weekend pending hospice care decisions. Will have care conference around 330 this afternoon.     Discussed with staff attending, Dr. Weber.    Margarita Dougherty PA-C  Hematology/Oncology  Pager #[phone]    Addendum:  Pt was seen and evaluated by me independently of the PA.  Reviewed labs and imaging and clinical course.  We had a long conversation regarding his overall disease.  He has metastatic colon cancer and has progressed through several lines of therapy.  Other options would linclude Lonsurf or Regorafenib, both of which are oral, and I don't think he can absorb.  Clinical trials - at this point with his performance status 2-3, I do not think he would qualify for these.    Reviewed what hospice is vs palliative care, etc.  He had a long family meeting yesterday with his SO, dad and palliative care, and feels comfortable transitioning to home hospice.    Anticipate discharge tomorrow.    Gina Weber MD    Interval History   Sebas feels a bit more uncomfortable this morning with mildly more distended abdomen. Notes that his ostomy output is about what is usual for him. Walking helps him feel a bit more comfortable. Complains of indigestion/heartburn which has been bothersome, especially at night. Took in a small amount of PO fluids in the past 24 hours. [facility] However [facility] would be [facility] to  continue TPN and PCA. Continues to state he'd like to discuss these decisions with family which is understandable.     Physical Exam   Temp: 97.8  F (36.6  C) Temp src: Oral BP: 122/78 Pulse: 99 Heart Rate: 96 Resp: 18 SpO2: 98 % O2 Device: None (Room air)    Vitals:    12/11/18 0832 12/12/18 1130 12/13/18 0822   Weight: 65.2 kg (143 lb 11.2 oz) 65.3 kg (143 lb 15.4 oz) 64.9 kg (143 lb 1.6 oz)     Vital Signs with Ranges  Temp:  [97.5  F (36.4  C)-98.4  F (36.9  C)] 97.8  F (36.6  C)  Pulse:  [87-99] 99  Heart Rate:  [94-98] 96  Resp:  [16-18] 18  BP: (122-140)/(78-89) 122/78  SpO2:  [97 %-100 %] 98 %  I/O last 3 completed shifts:  In: 475.45 [I.V.:30]  Out: 1675 [Urine:300; Emesis/NG output:800; Stool:575]    Constitutional: Pleasant cachectic male seen sitting up in bed, in NAD. Alert and interactive. Seen ambulating in halls frequently as well.  HEENT: NCAT, anicteric sclerae, conjunctiva clear. Moist mucous membranes.  Respiratory: Non-labored breathing, good air exchange. Lungs are clear to auscultation bilaterally, without wheezing, crackles or rhonchi. No cough noted.   Cardiovascular: Regular rate and rhythm with no murmur, rub or gallop.  GI: Quiet bowel sounds today. Abdomen mildly more distended today. Ostomy with yellow liquid output. Venting G tube draining green liquid. Tender to palpation in lower quadrants and RUQ mildly. Abdomen hard to palpation 2/2 cancer involvement. Midline incision well healed.  Skin: Warm and dry. No rashes or lesions on exposed surfaces.  Musculoskeletal: Extremities grossly normal. No tenderness or edema present.   Neurologic: A &O x3, speech normal, answering questions appropriately. Moves all extremities spontaneously. Grossly non-focal.  Neuropsychiatric: Mentation and affect normal/appropriate.  VAD: PICC is c/d/i with no erythema, drainage, or tenderness.    Medications   Current Facility-Administered Medications   Medication     acetaminophen (TYLENOL) tablet 650 mg      alum & mag hydroxide-simethicone (MYLANTA ES/MAALOX  ES) suspension 30 mL     dexamethasone (DECADRON) tablet 4 mg     dextrose 10 % 1,000 mL infusion     HYDROmorphone (PF) (DILAUDID) injection 1-2 mg     HYDROmorphone MAX CONC (DILAUDID) 300 mg/30mL infusion with bolus doses     lipids (INTRALIPID) 20 % infusion 250 mL     LORazepam (ATIVAN) tablet 0.5 mg     magnesium sulfate 4 g in 100 mL sterile water (premade)     Medication Instruction     methadone (DOLOPHINE) tablet 5 mg     naloxone (NARCAN) injection 0.1-0.4 mg     OLANZapine (zyPREXA) tablet 5 mg     ondansetron (ZOFRAN-ODT) ODT tab 8 mg     pantoprazole (PROTONIX) EC tablet 40 mg     parenteral nutrition - ADULT compounded formula CYCLE     potassium chloride (KLOR-CON) Packet 20-40 mEq     potassium chloride 10 mEq in 100 mL sterile water intermittent infusion (premix)     potassium chloride 20 mEq in 50 mL intermittent infusion     potassium chloride ER (K-DUR/KLOR-CON M) CR tablet 20-40 mEq     potassium phosphate 15 mmol in D5W 250 mL intermittent infusion     potassium phosphate 20 mmol in D5W 250 mL intermittent infusion     potassium phosphate 20 mmol in D5W 500 mL intermittent infusion     potassium phosphate 25 mmol in D5W 500 mL intermittent infusion     prochlorperazine (COMPAZINE) tablet 10 mg    Or     prochlorperazine (COMPAZINE) injection 10 mg     saline flush for lab use ONLY       Data   CBC  Recent Labs   Lab 12/13/18  0610 12/12/18  0615 12/11/18  0548 12/10/18  0627   WBC 7.0 7.7 9.7 9.7   RBC 3.19* 3.11* 3.33* 3.53*   HGB 8.0* 7.8* 8.4* 8.9*   HCT 26.6* 26.0* 28.0* 29.5*   MCV 83 84 84 84   MCH 25.1* 25.1* 25.2* 25.2*   MCHC 30.1* 30.0* 30.0* 30.2*   RDW 19.4* 19.7* 19.7* 19.6*    394 427 446     CMP  Recent Labs   Lab 12/13/18 0610 12/12/18 0615 12/11/18  0548 12/10/18  0627 12/09/18  0904    136 136 137 137   POTASSIUM 4.0 4.3 4.2 4.1 4.3   CHLORIDE 101 105 103 103 103   CO2 26 27 26 26 25   ANIONGAP 9 4 7 8 9    * 139* 134* 105* 109*   BUN 29 28 25 14 27   CR 0.59* 0.56* 0.63* 0.64* 0.57*   GFRESTIMATED >90 >90 >90 >90 >90   GFRESTBLACK >90 >90 >90 >90 >90   ANNAMARIE 8.5 8.5 8.6 8.9 8.6   MAG 2.0 2.0 2.1 1.9  --    PHOS 2.9 3.0 2.8 3.2  --    PROTTOTAL  --   --  7.8 7.9 7.8   ALBUMIN  --   --  2.1* 2.2* 2.3*   BILITOTAL  --   --  0.6 0.9 0.5   ALKPHOS  --   --  139 154* 158*   AST  --   --  19 24 23   ALT  --   --  20 23 28     INRNo lab results found in last 7 days.    Results for orders placed or performed during the hospital encounter of 12/09/18   CT Abdomen Pelvis w Contrast     Value    Radiologist flags Small bowel obstruction (Urgent)    Narrative    EXAMINATION: CT ABDOMEN PELVIS W CONTRAST  12/9/2018 10:59 AM      CLINICAL HISTORY: Abd pain, unspecified; Left-sided abdominal pain;  Left-sided abdominal pain    COMPARISON: Abd pain, unspecified; Left-sided abdominal pain;  Left-sided abdominal pain    PROCEDURE COMMENTS: CT of the abdomen was performed with 92cc of  isovue 370 intravenous and oral contrast. Coronal and sagittal  reformatted images were obtained.    FINDINGS:  Abdomen and pelvis:  Percutaneous gastrotomy tube in place. Stomach and upper GI small  bowels are dilated proximal to the right mid abdomen end ileostomy  measuring up to 4.8 cm in maximal diameter (series 3 image 250).  Overall the tumor burden seems significantly worse compares to  10/4/2018. For example the soft tissue thickness anterior to the small  bowel measures up to 6.2 cm, previously 2 cm the same level. There is  significant thickening of peritoneum and omentum with extensive  enlargement of mesenteric and retroperitoneal lymph nodes. Tumor  extension along the liver has also increased with scalloping of the  liver capsule, not seen on prior study (series 3 image 169).  Mesenteric lymphadenopathy (periaortic lymph node measures 1.1 cm  (series 2 image 105), increased compared to prior study. Postsurgical  changes of distal  small bowel resection. Overlapping stents are  present within the rectum and sigmoid colon, stable in position.  Persistent opacification with heterogeneous density within the stent.    New scalloping of hepatic capsule. New dilatation of intrahepatic  ductal system. Common bile duct appears more dilated compared to prior  study now measuring up to 1.8 cm, previously 1.1 cm (series 4 image  55). Pancreas appears atrophic. Adrenal glands are unremarkable. There  is bilateral hydronephrosis and hydroureter with compressed distal  ureters adjacent to the pelvic implants. Stable subcentimeter  hypodensity in the anterior mid right kidney. Urinary bladder is  mildly distended.    No infrarenal aorta aneurysm.    Lower thorax: New trace left pleural effusion.     Soft Tissue/Osseous structures: Multiple scattered sclerotic foci in  the medial left clavicle and ribs, not significant changed. Left-sided  rib fracture. Left total hip arthroplasty. Progress soft tissue  tumoral implants along the ventral midline abdomen.      Impression    IMPRESSION: In this patient with known history of sigmoid  adenocarcinoma with peritoneal metastasis  1. Multiple dilated loops of small bowel proximal to the right mid  abdomen ileostomy suggestive of small bowel obstruction. There is  percutaneous gastrotomy tube in place.  2. Overall there is worsening of disease progression with increasing  carcinomatosis and peritoneal tumor burden. For example significant  thickening of peritoneum and omentum implants, new hepatic capsule  scalloping, and enlarged mesenteric lymph nodes.  3. Mild bilateral hydronephrosis with hydroureter associated with new  perinephric fat stranding  4. Increased dilatation of intrahepatic and extrahepatic biliary  ductal system    [Urgent Result: Small bowel obstruction]    Finding was identified on 12/9/2018 11:00 AM.     Dr. Stewart was contacted by Dr. Rahman at 12/9/2018 11:10 AM and  verbalized understanding of  the urgent finding.     I have personally reviewed the examination and initial interpretation  and I agree with the findings.    KARMEN ODONNELL MD

## 2018-12-13 NOTE — PROGRESS NOTES
VSS, afebrile. A&Ox4. Pt c/o pain but feels comfortabley managed with PCA. Denies SOB/nausea overnight. Ileostomy intact with adequate output. Gtube to LIS, disconnected for ambulation in the halls tonight. Cycled TPN and lipids infusing. Patient will need to decide between palliative care or hospice, which is being followed up with social work. Continue to monitor w/ POC.

## 2018-12-13 NOTE — PROGRESS NOTES
Social Work Services Progress Note    Hospital Day: 5  Date of Initial Social Work Evaluation:  N/A  Collaborated with:  Patient, Pt's SO (Bessie - [phone]), Pt's father, Palliative MD (Dr. Martin), [facility] (Intake, Danay - [phone])    Data:  Pt is a 52 yo male; SW following for discharge planning.  Pt had care conference today involving his SO, father, Palliative MD, and SW.    Intervention:  Further discussed discharge options with his support system present.  Pt voiced his understanding of his options accurately, and ultimately decided that he does want to pursue hospice with [facility] at discharge as this provider will allow him to continue with TPN.  Pt's discharge date not finalized at this time.  SW explained that [facility] verified ability to meet with pt at his home for an intake on either 12/14 or 12/15.  At conclusion of meeting, agreed to wait for input from primary team (hem/onc), on discharge date.    SW spoke with [facility] again following meeting and intake is aware of logistics to confirm prior to discharge including pt's pain pump (SW to verify that pt can return home with his current pump) and TPN (SW will coordinate with [facility]).    Assessment:  Pt seemed more accepting of discharge plan with hospice than in previous interactions, and clearly voicing his wishes and understanding of choices. Pt's support system present today seem aligned with this decision as well.    Plan:    Anticipated Disposition:  Home with services - [facility]    Barriers to d/c plan:  Medical stability and finalizing discharge plan    Follow Up:  SW will continue to follow and coordinate logistics with [facility] and [facility] for transition home.    Tarah Crews, [facility]  7D Hematology/Oncology Social Worker  Phone: [phone]  Pager: [phone]

## 2018-12-13 NOTE — PLAN OF CARE
[id number]: VSS. Afebrile. Pain comfortably managed with scheduled pain medications and Dilaudid PCA. Dilaudid PCA settings: 10 mg/hr infusion rate with a bolus dose of 3.5 mg Q15 min). Patient received a total of 118 mg in the evening shift. Ileostomy intact with adequate output. G-tube to LIS with adequate outpatient. Patient clamps G-tube when medications are given. Cycled TPN and lipids infusing. Patient needing to decide between Palliative or Hospice. Continue with POC.

## 2018-12-13 NOTE — PROGRESS NOTES
This is a recent snapshot of the patient's [facility] medical record.  For current drug dose and complete information and questions, call [phone] or In Basket pool, [facility] home infusion ([id number])  CSN Number:  [id number]

## 2018-12-13 NOTE — PROGRESS NOTES
[facility], [place]    Palliative Care Progress Note    Patient: Sebas Lopez  Date of Admission:  12/9/2018    Recommendations:  - Would continue current PCA settings as increased PRN use over past 24 hours, would plan for next methadone increase no sooner than 12/15 - can be done by hospice at home.   - Agree with starting PPI and maalox for heartburn  - If worsened nausea tomorrow AM, would increase zyprexa to 5 mg BID, continue scheduled zofran  - Appreciate SW coordination for hospice - possible discharge tomorrow vs Saturday    Assessment & Plan   Sebas Lopez is a 53 year old male with peritoneal carcinomatosis from metastatic colon CA, admitted for increased pain/nausea from a chronic malignant SBO.     -Pain: Started methadone 5mg po BID 12/11, continued hydromorphone 10mg continuous, 3.5mg boluses q15min prn. Today pain a little worse with worsened heartburn.  Had not drained his g-tube in a few hours, recommended drainage.  Increased PRN use over last 24 hours.      -Nausea: says thinks is stable, but today with mild vomiting which he says feels like heartburn.  Started on PPI and maalox.  Encouraged to vent G tube.      GOC: Met with Sebas, partner Bessie, and father at bedside with  Tarah.  Sebas shared with his father that he is going on hospice with goals of having pain controlled and being able to continue TPN.  Also discussed that he would prefer not to have to go to the hospital when he had an acute problem, and I elaborated how hospice would handle those situations.  He is planning to go with [facility] hospice as can accomodate TPN.  Discussed resuscitation, says he is considering and is thinking he would not want that, but does not want to commit yet.  Lives in [place], [place] with hosp[place].      History of Present Illness   Pain a little worse today, increased PRN use over last 24 hours.  Carrying vomit bag walking in [place].   Says has been having new heartburn today, which has made him throw up.  Still having good ostomy output.     Medications   I have reviewed this patient's medication profile and medications given in the past 24 hours.    Decadron 4 mg daily  Hydromorphone 10 mg/hr, 3.5 mg bolus. In the past 24hrs (0630H-0630H) used 48 boluses = 168mg + 240 mg continous = 408mg total (yesterday 348.5)  Methadone 5 mg BID (started 12/11/18)  Olanzapine 5 mg QHS  Zofran 8 mg QID    Review of Systems   Palliative Symptom Review (0=no symptom/no concern, 1=mild, 2=moderate, 3=severe):  Pain: 2  Fatigue: 1  Nausea: 1  Anxiety: 0  Drowsiness: 0  Delirium: 0    Physical Exam   Vital Signs: Temp: 97.8  F (36.6  C) Temp src: Oral BP: 122/78 Pulse: 99 Heart Rate: 96 Resp: 18 SpO2: 98 % O2 Device: None (Room air)    Weight: 143 lbs 1.6 oz    Physical Exam:  Constitutional:  Frail patient sitting up in bed, no distress   HENT:  Normocephalic. MMM  Respiratory:  Breathing comfortably on room air  Cardiovascular:  Normal rate     GI:  Distended, firm, diffusely tender, BS present, G tube to suction draining green liquid.   Musculoskeletal: No BLE edema.  Integument:  Warm, Dry, no rash on visible skin  Neurologic:  Alert, attentive and appropriately oriented. Symmetric face, moving all 4 extremities.  Ambulating with IV pole.   Psychiatric:  Mood and affect normal.     Data reviewed today:   ROUTINE IP LABS (Last four results)  BMP  Recent Labs   Lab 12/13/18  0610 12/12/18 0615 12/11/18  0548 12/10/18  0627    136 136 137   POTASSIUM 4.0 4.3 4.2 4.1   CHLORIDE 101 105 103 103   ANNAMARIE 8.5 8.5 8.6 8.9   CO2 26 27 26 26   BUN 29 28 25 14   CR 0.59* 0.56* 0.63* 0.64*   * 139* 134* 105*     CBC  Recent Labs   Lab 12/13/18  0610 12/12/18  0615 12/11/18  0548 12/10/18  0627   WBC 7.0 7.7 9.7 9.7   RBC 3.19* 3.11* 3.33* 3.53*   HGB 8.0* 7.8* 8.4* 8.9*   HCT 26.6* 26.0* 28.0* 29.5*   MCV 83 84 84 84   MCH 25.1* 25.1* 25.2* 25.2*   MCHC 30.1*  30.0* 30.0* 30.2*   RDW 19.4* 19.7* 19.7* 19.6*    394 427 446     INRNo lab results found in last 7 days.    40 minutes spent, >50% in coordination and counseling.     Tarah Martin MD  Pager: [phone]  [facility] Inpatient Team Consult pager [phone] (M-F 8-4:30)  After-hours Answering Service [phone]   Palliative Clinic: [phone]

## 2018-12-14 NOTE — PLAN OF CARE
Temp: 97.6  F (36.4  C) Temp src: Oral BP: 122/82 Pulse: 100 Heart Rate: 105 Resp: 18 SpO2: 98 % O2 Device: None (Room air)       -abdominal pain controlled with PCA Dilaudid and methadone  -G-tube to LIWS with large output  -ileostomy with liquid stools; does own care and emptying  -on cyclic TPN and Lipids; also on Clears with poor appetite  -care conference was done today at 15:30  -up ad juan  -alert and oriented x 4    Continue with POC.

## 2018-12-14 NOTE — PLAN OF CARE
Patient independent with cares- able to clamp and unclamp  for meds  for GTube and empties ileostomy into container and urine into urinal in bathroom.Is considering Hospice but unready to stop TPN as he feels TPN is helpful.Adequate pain control and complaining of nausea-compazine requested and given approximately 0630.Continue with POC

## 2018-12-14 NOTE — PROGRESS NOTES
[facility], [place]    Palliative Care Progress Note    Patient: Sebas Lopez  Date of Admission:  12/9/2018    Recommendations:  - Would continue current PCA settings.  - Hospice requesting increase in methadone before discharge to 7.5mg BID. While we would typically wait until tomorrow to increase dose, given his overall large OME use this seems reasonable. If he develops sedation or respiratory depression would decrease continuous hydromorphone from 10mg/hr to 5mg/hr.  - Heartburn appears well controlled on PPI and maalox   - Nausea appears stable on zyprexa, zofran  - Appreciate SW coordination for hospice - possible discharge tomorrow vs Saturday  -We completed [place] POLST for you, patient has original and copy in chart.     Assessment & Plan   Sebas Lopez is a 53 year old male with peritoneal carcinomatosis from metastatic colon CA, admitted for increased pain/nausea from a chronic malignant SBO.     -Pain: Started methadone 5mg po BID 12/11, continued hydromorphone 10mg continuous, 3.5mg boluses q15min prn. Heartburn improved.  Prn bolus use about the same. Overall OME use in the past 4 hrs was hydromorphone 10mg/continuous = 40mg (400-800mg OME), eight 3.5mg boluses = 28mg (280-560mg OME), total over 4 hours = 680-1360 OMEs.     -Nausea/heartburn: Improved with PPI, Maalox, and previous antiemetic regimen.      GOC: Going home with [facility]. [place] POLST [place].    History of Present Illness   Pain and nausea improved. Appears much more comfortable than previous. No other new concerns, planning to go home with [facility], who can accommodate TPN and his PCA.    Medications   I have reviewed this patient's medication profile and medications given in the past 24 hours.    Decadron 4 mg daily  Hydromorphone 10 mg/hr, 3.5 mg bolus. In the past 4hrs (0700H-1120H) used 8 boluses  Methadone 5 mg BID (started 12/11/18)  Olanzapine 5 mg QHS  Zofran 8 mg QID    Review of  Systems   Palliative Symptom Review (0=no symptom/no concern, 1=mild, 2=moderate, 3=severe):  Pain: 1  Fatigue: 0  Nausea: 1  Anxiety: 0  Drowsiness: 0  Delirium: 0    Physical Exam   Vital Signs: Temp: 98  F (36.7  C) Temp src: Oral BP: 119/84 Pulse: 111 Heart Rate: 105 Resp: 20 SpO2: 97 % O2 Device: None (Room air)    Weight: 143 lbs 1.6 oz    Physical Exam:  Constitutional:  Frail patient sitting up in bed, no distress   HENT:  Normocephalic. MMM  Respiratory:  Breathing comfortably on room air  Cardiovascular:  Normal rate     GI:  Distended, firm, diffusely tender, BS present, G tube to gravity draining green liquid.   Musculoskeletal: No BLE edema.  Integument:  Warm, Dry, no rash on visible skin  Neurologic:  Alert, attentive and appropriately oriented. Symmetric face, moving all 4 extremities.     Psychiatric:  Mood and affect normal.     Data reviewed today:   ROUTINE IP LABS (Last four results)  BMP  Recent Labs   Lab 12/14/18  0543 12/13/18  0610 12/12/18  0615 12/11/18  0548    136 136 136   POTASSIUM 3.6 4.0 4.3 4.2   CHLORIDE 99 101 105 103   ANNAMARIE 8.8 8.5 8.5 8.6   CO2 34* 26 27 26   BUN 32* 29 28 25   CR 0.60* 0.59* 0.56* 0.63*   * 124* 139* 134*     CBC  Recent Labs   Lab 12/14/18  0543 12/13/18  0610 12/12/18  0615 12/11/18  0548   WBC 8.6 7.0 7.7 9.7   RBC 3.29* 3.19* 3.11* 3.33*   HGB 8.2* 8.0* 7.8* 8.4*   HCT 27.2* 26.6* 26.0* 28.0*   MCV 83 83 84 84   MCH 24.9* 25.1* 25.1* 25.2*   MCHC 30.1* 30.1* 30.0* 30.0*   RDW 19.2* 19.4* 19.7* 19.7*    396 394 427     Ashley Richardson  Pager: [phone]  [facility] Inpatient Team Consult pager [phone] (M-F 8-4:30)  After-hours Answering Service [phone]     Attestation:   Patient discussed with Dr. Richardson and I saw separately.  Completed [place] POLST with patient, will likely need [place] POLST completed with hospice at home.  Code status changed in Epic.  Given overall OME use, expect will tolerate hospice-requested change in methadone as using  frequent large bolus doses, and may decrease bolus need.  Given still high bolus need on top of continuous, would not change rate prior to discharge unless has drowsiness with increased dose.      Thank you for involving us in the patient's care.   Tarah Martin MD / Palliative Medicine / Pager [phone] / After-Hours Answering Service [phone] / Main Palliative Clinic - [facility] [phone] / [facility] Inpatient Team Consult Pager [phone] (answered 8am-430pm M-F)

## 2018-12-14 NOTE — PROGRESS NOTES
Social Work Services Discharge Note      Patient Name:  Sebas Lopez     Anticipated Discharge Date:  12/15/18    Discharge Disposition:   Hospice:  [facility] - [phone]   Hospice NP, Itzel - [phone]    **change from previous notes, [facility] is now willing to enroll pt in hospice with TPN    Following MD:  As assigned by [facility]     Pre-Admission Screening (PAS) online form has been completed.  The Level of Care (LOC) is:  Determined  Confirmation Code is:  N/A  Patient/caregiver informed of referral to [facility] for Pre-Admission Screening for skilled nursing facility (SNF) placement and to expect a phone call post discharge from SNF.     Additional Services/Equipment Arranged:    - Transportation: Either pt's SO, Bessie ([phone]) or his mother/father (Ericka - [phone]) will pick pt up.  Discharge time has not been finalized, and [facility] has agreed to meet pt at home sometime in late afternoon.  Time to be finalized in AM on 12/15 between Hem/Onc fellow and MICHAEL Robbins.  - Oxygen: N/A  - Hospice Equipment: Hospital bed, bedside table, and commode being delivered to Bessie's home ([address] [place] [address]), in AM on 12/15.  - POLST: Reviewed with pt by MD, and pt has copy at bedside  - Hospice Medications: Scripts sent to discharge pharmacy and need to be picked up by any staff and given to pt to bring home prior to discharge.  [facility] will be delivering home IV medications and TPN (as needed) directly to pt's home.     Patient / Family response to discharge plan:  In agreement; pt seemed to be comfortable with the current plan, and seems relieved now that [facility] can continue to provide his cares at home     Persons notified of above discharge plan:  Bedside RN/Charge RN on 12/15 (Gianna), Hem/Onc ELIF (Margarita), Palliative MD (Dr. Martin), RN CC (Lico), [facility] ([phone])    Staff Discharge Instructions:  Bessie or pt's parents will be bringing his portable pain  pump on 12/15 (Bessie verified she has it at home).  Please ensure this is hooked up prior to discharge. Please ensure pt leaves with discharge medications from  discharge pharmacy ([id number]).  [facility] declined needing discharge summary faxed.    CTS Handoff completed:  N/A    Medicare Notice of Rights provided to the patient/family:  N/A    JOSEE Nobles  7D Hematology/Oncology Social Worker  Phone: [phone]  Pager: [phone]

## 2018-12-14 NOTE — PLAN OF CARE
Vss. Ambulating the halls. Pain moderately controlled. On BID methadone now and used a total of 116.2mg on PCA this shift. Nausea bad this am - zyprexa ordered BID in addition to scheduled zofran. Continues on clear liquids with moderate output from G tube (on LIS as pt needs) and ileostomy. Will discharge tomorrow to [facility] with TPN - time TBD. Will need meds picked up in  pharmacy before discharge and to be hooked up to home pain pump before discharge.

## 2018-12-14 NOTE — PROGRESS NOTES
[facility], [place]    Hematology / Oncology Progress Note    Date of Admission: 12/9/2018  Hospital Day #: 5   Date of Service (when I saw the patient): 12/14/2018     Assessment & Plan   Sebas Lopez is a 53 year old male with PMHx significant for ulcerative colitis and metastatic colon cancer with peritoneal carcinomatosis s/p colonic stent, ileostomy, venting G-tube, TPN support and PCA pain control who presents for increased abdominal pain, nausea and vomiting. CT AP consistent with progressive disease and SBO.     #Recurrent SBO.   #Acute on chronic abdominal pain. 2/2 above.  #Peritoneal carcinomatosis.   #Ileostomy and venting G-tube.   Patient presents with increased nausea, vomiting and abdominal pain over the past few weeks. Unable to control abdominal pain with PTA PCA settings. He often had G-tube clamped but most recently had to have it open to gravity d/t pain. He purchased an electric pump to help with G-tube drainage but this was not helpful. He endorses decreased ileostomy output over the past few weeks as well. In ED, G tube placed to LIS with copious amounts of green liquid output and some mild symptomatic relief.   -CT AP in ED demonstrating multiple dilated small bowel loops proximal to end ileostomy, further disease progression including increased peritoneal tumor burden, new hepatic capsule scalloping and enlarged mesenteric LN, new mild bilateral hydronephrosis with perinephric fat stranding, and increased intra and extrahepatic biliary dilation.   -Continue G-tube to LIS. NPO status except for medications.   -Surg onc consult with no interventions to offer.   -Resume TPN in evening 12/10.   -Palliative care consult, appreciate recs. PCA settings below with moderate pain control. Started methadone 5 mg bid (x12/11) per palliative recs-->increased to 7.5 mg bid today per [facility] request. Endorses moderate pain control.  -Zofran scheduled, Zyprexa 5 mg  bid (x12/14), and dex 4 mg qAM. PRNs for breakthrough.   -PCA as follows: Basal rate at 10 mg (~25% increase) with 3.5 mg q15 minute bolus bumps. Per pall care, continue at current dosing range.  -For the past 24 hours PTA, patient's total continuous and PCA doses were 190.25 mg and 122.5 mg, respectively.   -Bowel sounds present 12/11-12, so diet advanced to clears. Ostomy output mildly increased/improved and G-tube output decreased. 12/13 bowel sounds quiet with mild increase in distension and discomfort. Ostomy output has increased some, and venting G tube output decreased. Continue clears for comfort with venting G to LIS/to gravity at all times. Had vomiting and discomfort yesterday, mildly improved today. Pain control ranked at 4-5/10. Minimal changes. Hospice transition tomorrow (see below).    #Metastatic colon cancer.  KRAS wild type. Follows with Dr. Long. Has progressed through multiple therapies. Most recently on irenotecan and panitumumab (C3 9/21) however was found to have progression of disease on CT scan thus this was discontinued. Per Dr. Long's most recent note, unable to offer further chemotherapy options. Patient has been having hospice care discussions with palliative care but has not yet made a decision. Electing full code status.   -Palliative care consult as above.   -Sebas is electing to discharge to home with [facility] hospice care. They will continue TPN and PCA through [facility] and NP Ele Gomez ([phone]) will provide orders for most needed hospice medications. Medications sent to discharge pharmacy to be picked up at discharge tomorrow include: Protonix, Maalox, methadone, ativan, tylenol, Zyprexa, Zofran, dex and compazine. Patient agreeable to discharge home tomorrow with intake/enrollment in hospice set up by [facility].    #New mild B/L hydronephrosis.  #Mild perinephric fat stranding.  New mild B/L hydronephrosis on admission CT A/P is likely from increasing obstruction from  progressive metastatic disease. Mild perinephric fat stranding is nonspecific and likely inflammatory given absence of clinical S/S of pyelonephritis and negative UTI.   - Monitor UOP.     #Chronic mild leucocytosis.  Likely related to progressive disease. No suspicion of infection at this time.   -Will discontinue daily labs with transition to hospice tomorrow.     FEN  - CLD as tolerated for comfort.  - Patient is on TPN 8PM although deferred resumption of TPN 12/9. Resumed 12/10 PM.  - PRN lyte replacement per standing protocol.     Prophylaxis   - Heparin subcutaneous discontinued as patient declining, ambulates frequently.                Code Status: Filled out POLST with palliative and elected DNR/DNI status.      Disposition: Plan to discharge home with [facility] support tomorrow afternoon.    Discussed with staff attending, Dr. Weber.    Margarita Dougherty PA-C  Hematology/Oncology  Pager #[phone]    Addendum:  Pt was seen and evaluated by me independently of the PA.  Reviewed labs and imaging and clinical course.  We had a long conversation regarding his overall disease.  He has metastatic colon cancer and has progressed through several lines of therapy.  Reviewed what hospice is vs palliative care, etc.  He had a long family meeting yesterday with his SO, dad and palliative care, and feels comfortable transitioning to home hospice.    Anticipate discharge tomorrow.    Gina Weber MD    Interval History   Sebas is feeling a bit better than yesterday. Less nausea/vomiting although continues to struggle with these intermittently. Pain is 4-5/10 and continues to ambulate in halls. Pain flares ranked at 8-9/10. Abdomen continues to be hard, but tenderness improved. Heartburn/indigestion improved with scheduled and prn medications. Agreeable to increase in methadone to 7.5 mg bid. Electing to discharge tomorrow with [facility].     Physical Exam   Temp: 98  F (36.7  C) Temp src: Oral BP: 119/84 Pulse: 111  Heart Rate: 105 Resp: 20 SpO2: 97 % O2 Device: None (Room air)    Vitals:    12/11/18 0832 12/12/18 1130 12/13/18 0822   Weight: 65.2 kg (143 lb 11.2 oz) 65.3 kg (143 lb 15.4 oz) 64.9 kg (143 lb 1.6 oz)     Vital Signs with Ranges  Temp:  [96  F (35.6  C)-98.2  F (36.8  C)] 98  F (36.7  C)  Pulse:  [] 111  Heart Rate:  [105] 105  Resp:  [16-20] 20  BP: (110-123)/(80-88) 119/84  SpO2:  [96 %-98 %] 97 %  I/O last 3 completed shifts:  In: 2460.7 [P.O.:300; I.V.:180]  Out: 3295 [Urine:650; Emesis/NG output:2125; Stool:520]    Constitutional: Pleasant cachectic male seen sitting up in bed, in NAD. Alert and interactive.  HEENT: NCAT, anicteric sclerae, conjunctiva clear. Moist mucous membranes.  Respiratory: Non-labored breathing, good air exchange. Lungs are clear to auscultation bilaterally, without wheezing, crackles or rhonchi. No cough noted.   Cardiovascular: Regular rate and rhythm with no murmur, rub or gallop.  GI: Quiet bowel sounds today. Abdomen full, mild tenderness diffusely. Ostomy with yellow liquid output. Venting G tube draining green liquid. Abdomen hard to palpation 2/2 cancer involvement. Midline incision well healed.  Skin: Warm and dry. No rashes or lesions on exposed surfaces.  Musculoskeletal: Extremities grossly normal. No tenderness or edema present.   Neurologic: A &O x3, speech normal, answering questions appropriately. Moves all extremities spontaneously. Grossly non-focal.  Neuropsychiatric: Mentation and affect normal/appropriate.  VAD: PICC is c/d/i with no erythema, drainage, or tenderness.    Medications   Current Facility-Administered Medications   Medication     acetaminophen (TYLENOL) tablet 650 mg     alum & mag hydroxide-simethicone (MYLANTA ES/MAALOX  ES) suspension 30 mL     dexamethasone (DECADRON) tablet 4 mg     dextrose 10 % 1,000 mL infusion     HYDROmorphone (DILAUDID) PCA 1 mg/mL OPIOID TOLERANT     HYDROmorphone (PF) (DILAUDID) injection 1-2 mg     HYDROmorphone MAX  CONC (DILAUDID) 300 mg/30mL infusion with bolus doses     lipids (INTRALIPID) 20 % infusion 250 mL     LORazepam (ATIVAN) tablet 0.5 mg     magnesium sulfate 4 g in 100 mL sterile water (premade)     Medication Instruction     methadone (DOLOPHINE) half-tab 7.5 mg     naloxone (NARCAN) injection 0.1-0.4 mg     naloxone (NARCAN) injection 0.1-0.4 mg     OLANZapine (zyPREXA) tablet 5 mg     ondansetron (ZOFRAN-ODT) ODT tab 8 mg     pantoprazole (PROTONIX) EC tablet 40 mg     parenteral nutrition - ADULT compounded formula CYCLE     parenteral nutrition - ADULT compounded formula CYCLE     potassium chloride (KLOR-CON) Packet 20-40 mEq     potassium chloride 10 mEq in 100 mL sterile water intermittent infusion (premix)     potassium chloride 20 mEq in 50 mL intermittent infusion     potassium chloride ER (K-DUR/KLOR-CON M) CR tablet 20-40 mEq     potassium phosphate 15 mmol in D5W 250 mL intermittent infusion     potassium phosphate 20 mmol in D5W 250 mL intermittent infusion     potassium phosphate 20 mmol in D5W 500 mL intermittent infusion     potassium phosphate 25 mmol in D5W 500 mL intermittent infusion     prochlorperazine (COMPAZINE) tablet 10 mg    Or     prochlorperazine (COMPAZINE) injection 10 mg     saline flush for lab use ONLY       Data   CBC  Recent Labs   Lab 12/14/18  0543 12/13/18  0610 12/12/18  0615 12/11/18  0548   WBC 8.6 7.0 7.7 9.7   RBC 3.29* 3.19* 3.11* 3.33*   HGB 8.2* 8.0* 7.8* 8.4*   HCT 27.2* 26.6* 26.0* 28.0*   MCV 83 83 84 84   MCH 24.9* 25.1* 25.1* 25.2*   MCHC 30.1* 30.1* 30.0* 30.0*   RDW 19.2* 19.4* 19.7* 19.7*    396 394 427     CMP  Recent Labs   Lab 12/14/18  0543 12/13/18  0610 12/12/18  0615 12/11/18  0548 12/10/18  0627  12/09/18  0904    136 136 136 137  --  137   POTASSIUM 3.6 4.0 4.3 4.2 4.1  --  4.3   CHLORIDE 99 101 105 103 103  --  103   CO2 34* 26 27 26 26  --  25   ANIONGAP 5 9 4 7 8  --  9   * 124* 139* 134* 105*  --  109*   BUN 32* 29 28 25 14   --  27   CR 0.60* 0.59* 0.56* 0.63* 0.64*  --  0.57*   GFRESTIMATED >90 >90 >90 >90 >90  --  >90   GFRESTBLACK >90 >90 >90 >90 >90  --  >90   ANNAMARIE 8.8 8.5 8.5 8.6 8.9  --  8.6   MAG 2.2 2.0 2.0 2.1 1.9   < >  --    PHOS 3.1 2.9 3.0 2.8 3.2   < >  --    PROTTOTAL  --   --   --  7.8 7.9  --  7.8   ALBUMIN  --   --   --  2.1* 2.2*  --  2.3*   BILITOTAL  --   --   --  0.6 0.9  --  0.5   ALKPHOS  --   --   --  139 154*  --  158*   AST  --   --   --  19 24  --  23   ALT  --   --   --  20 23  --  28    < > = values in this interval not displayed.     INRNo lab results found in last 7 days.    Results for orders placed or performed during the hospital encounter of 12/09/18   CT Abdomen Pelvis w Contrast     Value    Radiologist flags Small bowel obstruction (Urgent)    Narrative    EXAMINATION: CT ABDOMEN PELVIS W CONTRAST  12/9/2018 10:59 AM      CLINICAL HISTORY: Abd pain, unspecified; Left-sided abdominal pain;  Left-sided abdominal pain    COMPARISON: Abd pain, unspecified; Left-sided abdominal pain;  Left-sided abdominal pain    PROCEDURE COMMENTS: CT of the abdomen was performed with 92cc of  isovue 370 intravenous and oral contrast. Coronal and sagittal  reformatted images were obtained.    FINDINGS:  Abdomen and pelvis:  Percutaneous gastrotomy tube in place. Stomach and upper GI small  bowels are dilated proximal to the right mid abdomen end ileostomy  measuring up to 4.8 cm in maximal diameter (series 3 image 250).  Overall the tumor burden seems significantly worse compares to  10/4/2018. For example the soft tissue thickness anterior to the small  bowel measures up to 6.2 cm, previously 2 cm the same level. There is  significant thickening of peritoneum and omentum with extensive  enlargement of mesenteric and retroperitoneal lymph nodes. Tumor  extension along the liver has also increased with scalloping of the  liver capsule, not seen on prior study (series 3 image 169).  Mesenteric lymphadenopathy (periaortic  lymph node measures 1.1 cm  (series 2 image 105), increased compared to prior study. Postsurgical  changes of distal small bowel resection. Overlapping stents are  present within the rectum and sigmoid colon, stable in position.  Persistent opacification with heterogeneous density within the stent.    New scalloping of hepatic capsule. New dilatation of intrahepatic  ductal system. Common bile duct appears more dilated compared to prior  study now measuring up to 1.8 cm, previously 1.1 cm (series 4 image  55). Pancreas appears atrophic. Adrenal glands are unremarkable. There  is bilateral hydronephrosis and hydroureter with compressed distal  ureters adjacent to the pelvic implants. Stable subcentimeter  hypodensity in the anterior mid right kidney. Urinary bladder is  mildly distended.    No infrarenal aorta aneurysm.    Lower thorax: New trace left pleural effusion.     Soft Tissue/Osseous structures: Multiple scattered sclerotic foci in  the medial left clavicle and ribs, not significant changed. Left-sided  rib fracture. Left total hip arthroplasty. Progress soft tissue  tumoral implants along the ventral midline abdomen.      Impression    IMPRESSION: In this patient with known history of sigmoid  adenocarcinoma with peritoneal metastasis  1. Multiple dilated loops of small bowel proximal to the right mid  abdomen ileostomy suggestive of small bowel obstruction. There is  percutaneous gastrotomy tube in place.  2. Overall there is worsening of disease progression with increasing  carcinomatosis and peritoneal tumor burden. For example significant  thickening of peritoneum and omentum implants, new hepatic capsule  scalloping, and enlarged mesenteric lymph nodes.  3. Mild bilateral hydronephrosis with hydroureter associated with new  perinephric fat stranding  4. Increased dilatation of intrahepatic and extrahepatic biliary  ductal system    [Urgent Result: Small bowel obstruction]    Finding was identified on  12/9/2018 11:00 AM.     Dr. Stewart was contacted by Dr. Rahman at 12/9/2018 11:10 AM and  verbalized understanding of the urgent finding.     I have personally reviewed the examination and initial interpretation  and I agree with the findings.    KARMEN ODONNELL MD

## 2018-12-15 NOTE — PLAN OF CARE
Pt dcd to home with home hospice.hospice agency will f/unit(s) pt and pt and family understands discontinue meds.alert/oriented.

## 2018-12-15 NOTE — PLAN OF CARE
Afebrile, VSS. Pt remains on High conc. PCA w/ good pain management. Nausea controlled on scheduled anti-emetics. Ileostomy and G-tube managed by pt. Good oral intake. Pt up ambulating in halls independently. Plan to discharge to hospice tomorrow. Continue to monitor.

## 2018-12-15 NOTE — PLAN OF CARE
Patient able to sleep well between cares-Reports good pain control.Hoping to discharge home today.Much gi ouput  when GT to suction.Continue with POC

## 2018-12-15 NOTE — DISCHARGE SUMMARY
[facility], [place]    Discharge Summary  Hematology / Oncology Service    Date of Admission:  12/9/2018  Date of Discharge:  12/15/2018  Discharging Provider: Sunshine Pearson MD/PhD  Discharging Attending: Gina Weber MD  Date of Service: 12/15/2018    Discharge Diagnoses     Metastatic colon cancer with progressive disease    Peritoneal carcinomatosis    Recurrent small bowel obstructions requiring venting G-tube    Severe protein-calorie malnutrition requiring TPN    Acute on chronic cancer-related abdominal pain    Bilateral hydronephrosis (secondary to obstruction from intra-abdominal tumor burden)    History of Present Illness   Sebas Lopez is a 53 year old man with history of ulcerative colitis and metastatic colon cancer with peritoneal carcinomatosis complicated by recurrent bowel obstructions requiring colonic stent, ileostomy, venting G-tube and TPN.  He presented to the hospital on 12/9/2018 for evaluation of acute on chronic abdominal pain, nausea and vomiting.  CT on admission was consistent with progressive disease and recurrent small bowel obstruction.    Hospital Course   Sebas Lopez was admitted on 12/9/2018.    - The following problems were addressed during his hospitalization:    Metastatic colon cancer with progressive disease.  Follows with Dr. Long. Noted to have progressed through multiple lines of therapy. Most recently was on irinotecan an panitumumab (C3 started 9/21/18), however was found to have progression of disease on CT scan recently and this was discontinued. Due to recurrent SBOs, further therapy options were limited. Per Dr. Long s last clinic note, had been discussing hospice, though was not enrolled at the time of admission. We consulted the palliative care team, and had extensive discussions about hospice options.    The patient is electing to discharge home on hospice, and his home infusion service ([facility]) has arranged to  continue his TPN and PCA infusions.    Peritoneal carcinomatosis, with recurrent small bowel obstructions requiring venting G-tube.  Patient presented with increased nausea, vomiting and abdominal pain - increasing over the last few weeks. He had previously undergone palliative venting G-tube, and was also recently set-up with a pump in an attempt to manage the recurrent bowel obstructions at home. Imaging at the time of admission demonstrated multiple dilated small bowel loops proximal to the end ileostomy, as well as further disease progression including increased peritoneal tumor burden, new hepatic capsule scalloping, enlarged mesenteric lymph nodes, new mild bilateral hydronephrosis, and increased intra- and extra-hepatic biliary dilatation. The surgical oncology service was consulted and felt his disease was not amenable to surgical intervention. The bowel obstruction was managed conservatively with placement of his venting G-tube to suction (with copious output initially), resulting in symptomatic relief. He also noted increased ileostomy output eventually, and was able to tolerate small amounts of fluids.     He will continue to use his venting G-tube to gravity drainage as needed, and can try using the pump again if symptoms do not resolve with gravity drainage.    Continue anti-emetics as needed. We started olanzepine and dexamethasone this admission, with the assistance of the palliative care team.    Severe protein-calorie malnutrition requiring chronic TPN.  Patient is unable to tolerate much food or fluids due to intra-abdominal tumor burden and peritoneal carcinomatosis which cause recurrent bowel obstructions. He has been maintained on TPN.    We had several discussions regarding the use of TPN while on hospice, but ultimately the patient decided to continue TPN with the support of [facility] agency.    Acute on chronic cancer-related abdominal pain.  Chronic pain related to large intra-abdominal  tumor burden and peritoneal carcinomatosis with intermittent bowel obstructions. Acute pain this admission was likely related to recurrent bowel obstruction with evidence of progressive disease. Prior to admission, pain was controlled with a PCA at home, with concentrated hydromorphone. We consulted palliative care for assistance with pain management, and ultimately decided to start methadone in addition to a ~25% increase in his home PCA doses.    Patient will continue his high-concentrated hydromorphone PCA (through [facility]), and can continue to titrate up the methadone dose (currently 7.5 mg PO BID).    Bilateral hydronephrosis.  Imaging this admission demonstrated new mild bilateral hydronephrosis, likely secondary to obstruction by intra-abdominal tumor burden. Renal function remained within normal limits and he was making adequate amounts of urine. There was no evidence of UTI. Since the patient opted to transition to hospice, we did not pursue urologic intervention.    No further follow-up is needed unless the patient's goals-of-care change.    Sunshine Pearson MD/PhD  Heme/Onc Fellow    Significant Results and Procedures   CT ABDOMEN PELVIS W CONTRAST  12/9/2018 10:59 AM    IMPRESSION: In this patient with known history of sigmoid  adenocarcinoma with peritoneal metastasis  1. Multiple dilated loops of small bowel proximal to the right mid  abdomen ileostomy suggestive of small bowel obstruction. There is  percutaneous gastrotomy tube in place.  2. Overall there is worsening of disease progression with increasing  carcinomatosis and peritoneal tumor burden. For example significant  thickening of peritoneum and omentum implants, new hepatic capsule  scalloping, and enlarged mesenteric lymph nodes.  3. Mild bilateral hydronephrosis with hydroureter associated with new  perinephric fat stranding  4. Increased dilatation of intrahepatic and extrahepatic biliary  ductal system    Code Status   DNR / DNI    Physical  Exam   Blood pressure 133/89, pulse 111, temperature 97.1  F (36.2  C), temperature source Axillary, resp. rate 18, weight 61.6 kg (135 lb 11.2 oz), SpO2 96 %.  General: alert and cooperative, lying in bed, no acute distress, also seen ambulating the hallways comfortably  HEENT: sclera anicteric, EOMI, MMM  Neck: supple, normal ROM  CV: tachycardic, regular rhythm, normal S1/S2, no m/r/g  Resp: CTAB, no wheezing/crackles, normal respiratory effort on ambient air  GI: abdomen is firm and mildly distended (but not tight), mildly tender to palpation, bowel sounds present  MSK: warm and well-perfused, no edema  Skin: no rashes on limited exam, no jaundice  Neuro: AOx3, moves all extremities equally, no focal deficits    Discharge Disposition   Admitted to hospice care (at home).  Agency[facility].  Condition at discharge: Stable    Consultations This Hospital Stay   SURGICAL ONCOLOGY ADULT IP CONSULT  PALLIATIVE CARE ADULT IP CONSULT  MEDICATION HISTORY IP PHARMACY CONSULT  PHARMACY/NUTRITION TO START AND MANAGE TPN  MEDICATION HISTORY IP PHARMACY CONSULT    Discharge Orders      Home infusion referral      Reason for your hospital stay    You were hospitalized for recurrent small bowel obstruction and pain control.     Adult [facility] Follow-up and recommended labs and tests    Follow-up with Dr. Adams as needed.    Appointments on [facility] and/or [facility] (with [facility] or [facility] provider or service). Call [phone] if you haven't heard regarding these appointments within 7 days of discharge.     Activity    Your activity upon discharge: activity as tolerated     Discharge Instructions    You will be establishing care with a hospice team. Some of your medications were adjusted, and some medications were added.    Please continue taking the methadone, and continue the current PCA settings (to be adjusted by the hospice team).     DNR/DNI     Diet    Follow this diet upon discharge: advance diet as tolerated      Discharge Medications   Current Discharge Medication List      START taking these medications    Details   acetaminophen (TYLENOL) 325 MG tablet Take 2 tablets (650 mg) by mouth every 4 hours as needed for mild pain or fever  Qty: 1 Bottle, Refills: 0    Associated Diagnoses: Hospice care patient      alum & mag hydroxide-simethicone (MYLANTA ES/MAALOX  ES) 400-400-40 MG/5ML SUSP suspension Take 30 mLs by mouth every 4 hours as needed for indigestion or heartburn  Qty: 1 Bottle, Refills: 0    Associated Diagnoses: Gastroesophageal reflux disease without esophagitis      dexamethasone (DECADRON) 4 MG tablet Take 4 mg by mouth every morning.  Qty: 20 tablet, Refills: 0    Associated Diagnoses: Nausea and vomiting, intractability of vomiting not specified, unspecified vomiting type      methadone (DOLOPHINE) 5 MG tablet Take 1.5 tablets (7.5 mg) by mouth every 12 hours  Qty: 20 tablet, Refills: 0    Associated Diagnoses: Cancer of sigmoid colon (H); Hospice care patient      OLANZapine (ZYPREXA) 5 MG tablet Take 1 tablet (5 mg) by mouth 2 times daily  Qty: 40 tablet, Refills: 0    Associated Diagnoses: Nausea and vomiting, intractability of vomiting not specified, unspecified vomiting type      pantoprazole (PROTONIX) 40 MG EC tablet Take 1 tablet (40 mg) by mouth every morning (before breakfast)  Qty: 20 tablet, Refills: 0    Associated Diagnoses: Gastroesophageal reflux disease without esophagitis         CONTINUE these medications which have CHANGED    Details   LORazepam (ATIVAN) 0.5 MG tablet Take 1-2 tablets (0.5-1 mg) by mouth every 4 hours as needed for agitation, anxiety or nausea  Qty: 30 tablet, Refills: 0    Associated Diagnoses: Cancer of sigmoid colon (H); Hospice care patient      !! NONFORMULARY PCA hydromorphone HCL 1250 mg NS IV bag from [facility] home infusion.  Regimen:   Basal rate: 10 mg/hr  Bolus bumps: 3.5 mg q15 minutes prn  **Discharge to [facility] care with current regimen.    Associated  Diagnoses: Cancer of sigmoid colon (H); Hospice care patient; Neoplasm related pain      ondansetron (ZOFRAN-ODT) 8 MG ODT tab Take 1 tablet (8 mg) by mouth 4 times daily (before meals and nightly)  Qty: 80 tablet, Refills: 0    Associated Diagnoses: Nausea      !! parenteral nutrition - PTA/DISCHARGE ORDER Continue at discharge: Inject daily into the vein from [facility]. Administers each night at 8 pm x 12 hours, then off for 12 hours.      prochlorperazine (COMPAZINE) 10 MG tablet Take 1 tablet (10 mg) by mouth every 6 hours as needed for nausea  Qty: 30 tablet, Refills: 0    Associated Diagnoses: Peritoneal carcinomatosis (H); Nausea and vomiting, intractability of vomiting not specified, unspecified vomiting type       !! - Potential duplicate medications found. Please discuss with provider.      CONTINUE these medications which have NOT CHANGED    Details   !! order for DME Equipment being ordered: intermittent suction equipment for venting gtube  Qty: 1 Units, Refills: 0    Associated Diagnoses: Colon adenocarcinoma (H); Peritoneal carcinomatosis (H); Attention to G-tube (H)      !! order for DME Equipment being ordered: home suction machine with tubing for connection to venting G-tube  Treatment Diagnosis: colon adenocarcinoma  Qty: 1 Device, Refills: 0    Associated Diagnoses: Colon adenocarcinoma (H); Small bowel obstruction (H)       !! - Potential duplicate medications found. Please discuss with provider.      STOP taking these medications       naloxone (NARCAN) nasal spray Comments:   Reason for Stopping:         sodium chloride 0.9% SOLN BOLUS Comments:   Reason for Stopping:             Data   Component      Latest Ref Rng & Units 12/9/2018 12/15/2018   WBC      4.0 - 11.0 10e9/L 12.1 (H) 7.9   Hemoglobin      13.3 - 17.7 g/dL 8.3 (L) 8.8 (L)   Platelet Count      150 - 450 10e9/L 463 (H) 428     Component      Latest Ref Rng & Units 12/9/2018 12/14/2018   Sodium      133 - 144 mmol/L 137 138   Potassium       3.4 - 5.3 mmol/L 4.3 3.6   Chloride      94 - 109 mmol/L 103 99   Carbon Dioxide      20 - 32 mmol/L 25 34 (H)   Anion Gap      3 - 14 mmol/L 9 5   Glucose      70 - 99 mg/dL 109 (H) 136 (H)   Urea Nitrogen      7 - 30 mg/dL 27 32 (H)   Creatinine      0.66 - 1.25 mg/dL 0.57 (L) 0.60 (L)   GFR Estimate      >60 mL/min/1.7m2 >90 >90   GFR Estimate If Black      >60 mL/min/1.7m2 >90 >90   Calcium      8.5 - 10.1 mg/dL 8.6 8.8   Bilirubin Total      0.2 - 1.3 mg/dL 0.5    Albumin      3.4 - 5.0 g/dL 2.3 (L)    Protein Total      6.8 - 8.8 g/dL 7.8    Alkaline Phosphatase      40 - 150 U/L 158 (H)    ALT      0 - 70 U/L 28    AST      0 - 45 U/L 23      Addendum:  Pt was seen and evaluated by me with Dr Pearson.  Reviewed labs, imaging and clinical course.  We have many discussions over the course of his hospital stay regarding cancer ,pain mgmt.  Plan to discharge with home hospice.    PRN follow up.    Gina Weber MD

## 2018-12-17 NOTE — PROGRESS NOTES
Post hospital discharge follow up - patient discharge with referral to [facility].  Ashley Marie RN BSN   [facility]  Nurse Coordinator

## 2018-12-17 NOTE — PROGRESS NOTES
This is a recent snapshot of the patient's [facility] medical record.  For current drug dose and complete information and questions, call [phone] or In [facility] pool, [facility]v home infusion ([id number])  CSN Number:  [id number]

## 2018-12-18 NOTE — PROGRESS NOTES
This is a recent snapshot of the patient's [facility] medical record.  For current drug dose and complete information and questions, call [phone] or In [facility] pool, [facility] home infusion ([id number])  CSN Number:  [id number]

## 2018-12-21 NOTE — PROGRESS NOTES
This is a recent snapshot of the patient's [facility] medical record.  For current drug dose and complete information and questions, call [phone] or In [facility] pool, fv home infusion ([id number])  CSN Number:  [id number]

## 2018-12-24 NOTE — PROGRESS NOTES
This is a recent snapshot of the patient's [facility] medical record.  For current drug dose and complete information and questions, call [phone] or In [facility] pool, [facility] home infusion ([id number])  CSN Number:  [id number]

## 2018-12-26 NOTE — PROGRESS NOTES
This is a recent snapshot of the patient's [facility] medical record.  For current drug dose and complete information and questions, call [phone] or In [facility] pool, [facility] home infusion ([id number])  CSN Number:  [id number]

## 2018-12-27 NOTE — PROGRESS NOTES
This is a recent snapshot of the patient's [facility] medical record.  For current drug dose and complete information and questions, call [phone] or In [facility] pool, [facility] home infusion ([id number])  CSN Number:  [id number]

## 2019-01-02 ENCOUNTER — HOME INFUSION (PRE-WILLOW HOME INFUSION) (OUTPATIENT)
Dept: PHARMACY | Facility: CLINIC | Age: 54
End: 2019-01-02

## 2019-01-03 ENCOUNTER — DOCUMENTATION ONLY (OUTPATIENT)
Dept: OTHER | Facility: CLINIC | Age: 54
End: 2019-01-03

## 2019-01-03 ENCOUNTER — CARE COORDINATION (OUTPATIENT)
Dept: ONCOLOGY | Facility: CLINIC | Age: 54
End: 2019-01-03

## 2019-01-03 NOTE — PROGRESS NOTES
Received notice of patient death.  Date of Death  12/31/2018  All appointments, orders and treatment plans cancelled.  Care TEAM and HIM notified

## 2019-01-03 NOTE — PROGRESS NOTES
This is a recent snapshot of the patient's [facility] medical record.  For current drug dose and complete information and questions, call [phone] or In [facility] pool, fv home infusion ([id number])  CSN Number:  [id number]

## 2019-01-04 NOTE — PROGRESS NOTES
This is a recent snapshot of the patient's [facility] medical record.  For current drug dose and complete information and questions, call [phone] or In [facility] pool, [facility] home infusion ([id number])  CSN Number:  [id number]

## 2020-04-26 NOTE — ANESTHESIA CARE TRANSFER NOTE
Patient: Sebas Lopez    Procedure(s):  Diagnostic Laparoscopy, Exploratory Laparotomy Anesthesia Block  - Wound Class: III-Contaminated   - Wound Class: III-Contaminated    Diagnosis: Metastatic Colon Cancer   Diagnosis Additional Information: No value filed.    Anesthesia Type:   General, ETT     Note:  Airway :Nasal Cannula  Patient transferred to:PACU  Comments: To PACU, VSS, airway patent, RN at bedside.Handoff Report: Identifed the Patient, Identified the Reponsible Provider, Reviewed the pertinent medical history, Discussed the surgical course, Reviewed Intra-OP anesthesia mangement and issues during anesthesia, Set expectations for post-procedure period and Allowed opportunity for questions and acknowledgement of understanding      Vitals: (Last set prior to Anesthesia Care Transfer)    CRNA VITALS  12/6/2017 0858 - 12/6/2017 0937      12/6/2017             NIBP: 116/82    Pulse: 86    Resp Rate (observed): 14                Electronically Signed By: ALEXA Toscano CRNA  December 6, 2017  9:37 AM  
Normal rate, regular rhythm.  Heart sounds S1, S2.  No murmurs, rubs or gallops.

## 2020-05-21 NOTE — Clinical Note
9/22/2017       RE: Sebas Lopez  [address] [place] [address]     Dear Colleague,    Thank you for referring your patient, Sebas Lopez, to the [facility]. Please see a copy of my visit note below.    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 Imaging performed at that time showing sigmoid wall thickening with adjacent mesenteric lymphadenopathy with free fluid near the abdominal wall mess from prior hernia surgery. He underwent a colonoscopy that showed an obstructing sigmoid colon mass. Biopsy of the mass showing sigmoid adenocarcinoma. He then developed obstructive symptoms and underwent an exploratory laparotomy on 7/10/2017. During that procedure, he was found to have diffuse peritoneal carcinomatosis. Extensive lysis of adhesions was performed and a small bowel resection was performed with end ileostomy. The biopsies from multiple large peritoneal metastases were also positive for metastatic adenocarcinoma. He started FOLFOX (5-FU, oxaliplatin, leucovorin) on 8/11/17. He comes in today for routine follow up prior to cycle 4.     INTERVAL HISTORY:   Patient reports that he     Current Outpatient Prescriptions   Medication Sig Dispense Refill     ondansetron (ZOFRAN) 8 MG tablet Take 1 tablet (8 mg) by mouth every 8 hours 90 tablet 2     opium tincture tincture Take 0.6 mLs (6 mg) by mouth 4 times daily 72 mL 0     Psyllium (METAMUCIL FIBER) 51.7 % PACK Take 1 packet by mouth 3 times daily 90 each 3     diphenoxylate-atropine (LOMOTIL) 2.5-0.025 MG per tablet Take 1-2 tablets by mouth 4 times daily as needed for diarrhea (Patient not taking: Reported on 9/8/2017) 40 tablet 1     LORazepam (ATIVAN) 0.5 MG tablet Take 1 tablet (0.5 mg) by mouth every 4  hours as needed (Anxiety, Nausea/Vomiting or Sleep) (Patient not taking: Reported on 9/8/2017) 30 tablet 2     prochlorperazine (COMPAZINE) 10 MG tablet Take 1 tablet (10 mg) by mouth every 6 hours as needed (Nausea/Vomiting) (Patient not taking: Reported on 9/8/2017) 30 tablet 2     cholestyramine (QUESTRAN) 4 G Packet Take 2 packets by mouth 3 times daily (with meals)       loperamide (IMODIUM) 2 MG capsule Take 2 mg by mouth 4 times daily as needed for diarrhea       Ferrous Sulfate (IRON SUPPLEMENT PO)           No Known Allergies    PHYSICAL EXAMINATION  There were no vitals taken for this visit.  Constitutional: Alert, oriented male in no visible distress.  Eyes: PERRL. Anicteric sclerae.  ENT/Mouth: OM moist and pink without lesions or thrush.  CV: RRR.  Resp: CTAB throughout  Abdomen: Soft, non-tender, non-distended. Bowel sounds present. No masses appreciated. No organomegaly noted. Ostomy at RLQ draining liquid stool with very little form.  Extremities: No lower extremity edema appreciated.  Skin: Warm, dry. No bruising or petechiae noted.  Lymph: No cervical or supraclavicular lymphadenopathy appreciated.   Neuro: CN II-XII grossly intact.    LABS:    IMPRESSION/PLAN:  Sebas Lopez is a 52 year old male with history of ulcerative colitis, now with sigmoid adenocarcinoma with peritoneal carcinomatosis, status-post ex-lap on 7/10 with extensive lysis of adhesions and small-bowel resection with end ileostomy.    Sigmoid cancer with peritoneal carcinomatosis:  He started on FOLFOX on 8/11/17. He appears to have tolerated the first 2 cycles well with some mild cold sensitivity that has since resolved. He had brief tingling in his fingers with cycle 2. He will start cycle 3 today. He will follow up in clinic every 2 weeks prior to each cycle.   - Plan to re-image after 4-6 cycles and return to see Dr. Dudley  - He may potentially be able to go on to cytoreductive surgery with [facility] as mentioned in   Gaertner's note  - Seeing genetics next week. Discussed bringing a list of family member's serious medical issues to appointment, particularly any cancers. Discussed that he does not have to have genetic testing if he does not wish to have it, but the visit is helpful to discuss if there are any potential concerns for a genetic link to his diagnosis.     High-output ostomy: Chronic since surgery. Currently on Imodium up to 8 tablets/day and tincture of opium 6 mg qid. He previously did not find a benefit from Lomotil. Will try adding in Zofran 8 mg tid scheduled, as this medication often causes constipation. He will continue to push fluids orally. He appears mildly dehydrated today and will receive 1L IV NS today. He also has cholestyramine available at home. I will check with Dr. Dudley to see what other ideas he has for management.  Addendum: Discussed ostomy output with Dr. Dudley. Will plan to add in Metamucil tid and check for C.diff.    Iron deficiency anemia: secondary to chronic blood loss. Received Infed 8/8. Hgb is trending up today.  Will recheck iron studies in early November.     Weight loss: Patient will continue to work on a high calorie diet. He is currently getting in 4602-8093 calories/day. He will meet with a dietician next week to discuss diet management to help with the weight loss and high output ostomy.     Hypokalemia: Better than last time, but still mildly low. Will replace IV today. Secondary to high output ostomy. He was also previously given a list of high potassium foods.    Linda Alves PA-C  [facility]  [address] [place] [id number] [phone]        Again, thank you for allowing me to participate in the care of your patient.      Sincerely,    Linda Alves PA-C     Cartilage Graft Text: The defect edges were debeveled with a #15 scalpel blade.  Given the location of the defect, shape of the defect, the fact the defect involved a full thickness cartilage defect a cartilage graft was deemed most appropriate.  An appropriate donor site was identified, cleansed, and anesthetized. The cartilage graft was then harvested and transferred to the recipient site, oriented appropriately and then sutured into place.  The secondary defect was then repaired using a primary closure.

## 2020-07-08 NOTE — TELEPHONE ENCOUNTER
Central Prior Authorization Team   Phone: [phone]      PA Initiation    Medication: FENTANYL 25 MCG PATCH-PA initiated  Insurance Company: [employer] - Phone [phone] Fax [phone]  Pharmacy Filling the Rx: [facility] - [place] - [place], [place] - [address] [place] [address]  Filling Pharmacy Phone: [phone]  Filling Pharmacy Fax:    Start Date: 7/16/2018         Vitamin d is much better.   Recommend vitamin D otc now 2000 iu per day

## 2020-11-20 NOTE — MR AVS SNAPSHOT
After Visit Summary   7/24/2018    Sebas Lopez    MRN: [id number]           Patient Information     Date Of Birth          1965        Visit Information        Provider Department      7/24/2018 2:00 PM Jacque Dale PA [facility]        Today's Diagnoses     Cancer of sigmoid colon (H)    -  1    Diarrhea, unspecified type        Hypokalemia           Follow-ups after your visit        Your next 10 appointments already scheduled     Jul 24, 2018  8:00 PM CDT   CT ABDOMEN PELVIS W/O CONTRAST with UCCT1   [facility] CT ([facility])    [address] [address] [phone]           Please bring any scans or X-rays taken at other hospitals, if similar tests were done. Also bring a list of your medicines, including vitamins, minerals and over-the-counter drugs. It is safest to leave personal items at home.  Be sure to tell your doctor:   If you have any allergies.   If there s any chance you are pregnant.   If you are breastfeeding.  How to prepare:   Do not eat or drink for 2 hours before your exam. If you need to take medicine, you may take it with small sips of water. (We may ask you to take liquid medicine as well.)   Please wear loose clothing, such as a sweat suit or jogging clothes. Avoid snaps, zippers and other metal. We may ask you to undress and put on a hospital gown.  Please arrive 30 minutes early for your CT. Once in the department you might be asked to drink water 15-20 minutes prior to your exam.  If indicated you may be asked to drink an oral contrast in advance of your CT.  If this is the case, the imaging team will let you know or be in contact with you prior to your appointment  Patients over 70 or patients with diabetes or kidney problems:   If you haven t had a blood test (creatinine test) within the last 30 days, the Cardiologist/Radiologist may require you to get this  Problem: Nutrition  Goal: Optimal nutrition therapy  11/20/2020 0836 by Brad Ashraf RD, LD  Outcome: Ongoing  11/20/2020 0432 by Olvin Capps RN  Outcome: Ongoing   Nutrition Problem #1: Inadequate protein-energy intake  Intervention: Food and/or Nutrient Delivery: Continue NPO  Nutritional Goals: po intake 50% or greater.   Weight stable or increase 1-3# per week test prior to your exam.  If you have diabetes:   Continue to take your metformin medication on the day of your exam  If you have any questions, please call the Imaging Department where you will have your exam.              Future tests that were ordered for you today     Open Future Orders        Priority Expected Expires Ordered    CBC with platelets differential Routine 7/24/2018 7/23/2019 7/23/2018    Comprehensive metabolic panel Routine 7/24/2018 7/23/2019 7/23/2018    Magnesium Routine 7/24/2018 7/23/2019 7/23/2018    Phosphorus Routine 7/24/2018 7/23/2019 7/23/2018            Who to contact     If you have questions or need follow up information about today's clinic visit or your schedule please contact [facility] directly at [phone].  Normal or non-critical lab and imaging results will be communicated to you by MyChart, letter or phone within 4 business days after the clinic has received the results. If you do not hear from us within 7 days, please contact the clinic through [employer] or phone. If you have a critical or abnormal lab result, we will notify you by phone as soon as possible.  Submit refill requests through [employer] or call your pharmacy and they will forward the refill request to us. Please allow 3 business days for your refill to be completed.          Additional Information About Your Visit        [employer] Information     [employer] gives you secure access to your electronic health record. If you see a primary care provider, you can also send messages to your care team and make appointments. If you have questions, please call your primary care clinic.  If you do not have a primary care provider, please call [phone] and they will assist you.        Care EveryWhere ID     This is your Care EveryWhere ID. This could be used by other organizations to access your [place] medical records  [id number]        Your Vitals Were     Pulse Temperature Respirations Pulse Oximetry  BMI (Body Mass Index)       115 97.5  F (36.4  C) (Oral) 16 98% 21.18 kg/m2        Blood Pressure from Last 3 Encounters:   07/24/18 109/85   07/20/18 111/74   07/10/18 112/71    Weight from Last 3 Encounters:   07/24/18 65 kg (143 lb 6.4 oz)   07/20/18 69.9 kg (154 lb 3.2 oz)   07/10/18 73 kg (161 lb)               Primary Care Provider Office Phone # Fax #    Jaguar Becker [phone]       [facility] [address]  [facility] [address]        Equal Access to Services     SARAH DUNN : Hadii karrie Funk, waiain abernathy, cain tsangmada lewis, bhargav sage . So [facility] [phone].    ATENCIÓN: Si habla español, tiene a cid disposición servicios gratuitos de asistencia lingüística. Llame al [phone].    We comply with applicable federal civil rights laws and [place] laws. We do not discriminate on the basis of race, color, national origin, age, disability, sex, sexual orientation, or gender identity.            Thank you!     Thank you for choosing [facility]  for your care. Our goal is always to provide you with excellent care. Hearing back from our patients is one way we can continue to improve our services. Please take a few minutes to complete the written survey that you may receive in the mail after your visit with us. Thank you!             Your Updated Medication List - Protect others around you: Learn how to safely use, store and throw away your medicines at www.disposemymeds.org.          This list is accurate as of 7/24/18  4:50 PM.  Always use your most recent med list.                   Brand Name Dispense Instructions for use Diagnosis    amylase-lipase-protease 22959 units Cpep    CREON    240 capsule    Take 2 capsules (72,000 Units) by mouth 3 times daily (with meals) And 1 capsule with each snack tid.    Diarrhea, unspecified type, Peritoneal carcinomatosis (H), Cancer of sigmoid colon (H)        cyanocobalamin 1000 MCG tablet    vitamin  B-12     Take 1,000 mcg by mouth daily        dronabinol 5 MG capsule    MARINOL    60 capsule    Take 1 capsule (5 mg) by mouth 2 times daily (before meals)    Peritoneal carcinomatosis (H), Anorexia       * fentaNYL 25 mcg/hr 72 hr patch    DURAGESIC    1 patch    Place 1 patch onto the skin every 72 hours remove old patch.    Cancer of sigmoid colon (H)       * FentaNYL 37.5 MCG/HR Pt72     10 patch    Place 37 mcg/hr onto the skin every 72 hours    Colon adenocarcinoma (H)       FLAGYL 500 MG tablet   Generic drug:  metroNIDAZOLE     16 tablet    Take 1 tablet (500 mg) by mouth 3 times daily    Colitis       loperamide 2 MG capsule    IMODIUM    30 capsule    2 caps at 1st sign of diarrhea & 1 cap every 2hrs until 12hrs diarrhea free. During night, 2 caps at bedtime & 2 caps every 4hrs until AM    Peritoneal carcinomatosis (H), Cancer of sigmoid colon (H)       * LORazepam 0.5 MG tablet    ATIVAN    30 tablet    Take 1 tablet (0.5 mg) by mouth every 4 hours as needed (Anxiety, Nausea/Vomiting or Sleep)    Peritoneal carcinomatosis (H), Cancer of sigmoid colon (H)       * LORazepam 0.5 MG tablet    ATIVAN    30 tablet    Take 1 tablet (0.5 mg) by mouth every 4 hours as needed (Anxiety, Nausea/Vomiting or Sleep)    Peritoneal carcinomatosis (H), Cancer of sigmoid colon (H)       MULTIVITAMIN & MINERAL PO      Take 1 tablet by mouth daily        naloxone nasal spray    NARCAN    0.2 mL    Spray 1 spray (4 mg) into one nostril alternating nostrils as needed for opioid reversal every 2-3 minutes until assistance arrives    Cancer of sigmoid colon (H), Long term current use of opiate analgesic       NIACIN PO      Take by mouth daily        omeprazole 20 MG CR capsule    priLOSEC    30 capsule    Take 1 capsule (20 mg) by mouth daily    Diarrhea, unspecified type       ondansetron 8 MG ODT tab    ZOFRAN-ODT    60 tablet    Take 1 tablet (8 mg) by mouth every 8 hours as needed  for nausea    Nausea       ondansetron 8 MG tablet    ZOFRAN    10 tablet    Take 1 tablet (8 mg) by mouth every 8 hours as needed (Nausea/Vomiting)    Peritoneal carcinomatosis (H), Cancer of sigmoid colon (H)       opium tincture 10 MG/ML (1%) liquid     72 mL    Take 0.6 mLs (6 mg) by mouth 4 times daily    Cancer of sigmoid colon (H), Diarrhea, unspecified type       * oxyCODONE IR 5 MG tablet    ROXICODONE    20 tablet    Take 15-25 mg q4 hrs as needed for moderate to severe pain (can take with 10 mg tabs of previous prescription to make 15 mg or 25 mg doses).    Cancer of sigmoid colon (H)       * oxyCODONE HCl 20 MG Tabs immediate release tablet    ROXICODONE    180 tablet    Take 20-30 mg by mouth every 4 hours as needed for moderate to severe pain Take 20-30 mg every 4 hours as needed for moderate to severe pain    Peritoneal carcinomatosis (H)       parenteral nutrition - PTA/DISCHARGE ORDER      Inject into the vein daily [facility]        prochlorperazine 10 MG tablet    COMPAZINE    30 tablet    Take 1 tablet (10 mg) by mouth every 6 hours as needed (Nausea/Vomiting)    Peritoneal carcinomatosis (H), Cancer of sigmoid colon (H)       sodium chloride 0.9% Soln BOLUS     1000 mL    Inject 1,000 mLs into the vein daily as needed For hydration.        * Notice:  This list has 6 medication(s) that are the same as other medications prescribed for you. Read the directions carefully, and ask your doctor or other care provider to review them with you.

## 2021-06-10 ENCOUNTER — OFFICE VISIT (OUTPATIENT)
Dept: ORTHOPEDIC SURGERY | Age: 56
End: 2021-06-10
Payer: COMMERCIAL

## 2021-06-10 VITALS
BODY MASS INDEX: 27.58 KG/M2 | OXYGEN SATURATION: 98 % | WEIGHT: 197 LBS | HEIGHT: 71 IN | HEART RATE: 95 BPM | TEMPERATURE: 97.3 F

## 2021-06-10 DIAGNOSIS — M17.11 PRIMARY OSTEOARTHRITIS OF RIGHT KNEE: ICD-10-CM

## 2021-06-10 DIAGNOSIS — M25.561 CHRONIC PAIN OF RIGHT KNEE: Primary | ICD-10-CM

## 2021-06-10 DIAGNOSIS — G89.29 CHRONIC PAIN OF RIGHT KNEE: Primary | ICD-10-CM

## 2021-06-10 PROCEDURE — 99203 OFFICE O/P NEW LOW 30 MIN: CPT | Performed by: SPECIALIST

## 2021-06-10 PROCEDURE — 20610 DRAIN/INJ JOINT/BURSA W/O US: CPT | Performed by: SPECIALIST

## 2021-06-10 PROCEDURE — 73562 X-RAY EXAM OF KNEE 3: CPT | Performed by: SPECIALIST

## 2021-06-10 RX ORDER — BETAMETHASONE SODIUM PHOSPHATE AND BETAMETHASONE ACETATE 3; 3 MG/ML; MG/ML
3 INJECTION, SUSPENSION INTRA-ARTICULAR; INTRALESIONAL; INTRAMUSCULAR; SOFT TISSUE ONCE
Status: COMPLETED | OUTPATIENT
Start: 2021-06-10 | End: 2021-06-10

## 2021-06-10 RX ORDER — IBUPROFEN 800 MG/1
800 TABLET ORAL AS NEEDED
Status: ON HOLD | COMMUNITY
End: 2021-10-07 | Stop reason: CLARIF

## 2021-06-10 RX ADMIN — BETAMETHASONE SODIUM PHOSPHATE AND BETAMETHASONE ACETATE 3 MG: 3; 3 INJECTION, SUSPENSION INTRA-ARTICULAR; INTRALESIONAL; INTRAMUSCULAR; SOFT TISSUE at 14:24

## 2021-06-10 NOTE — PROGRESS NOTES
Patient: Delmi Tejeda                MRN: [id number]       SSN: [id number]  YOB: 1965        AGE: 60 y.o. SEX: male    PCP: Flor Crump MD  06/10/21    Chief Complaint   Patient presents with    Knee Pain     right knee     HISTORY:  Delmi Tejeda is a 60 y.o. male who is seen for right knee pain. He has been experiencing right knee knee pain for the past several years with increased pain in the past 3 weeks. He sustained a wrestling injury several years ago. He feels pain with standing, walking and stair climbing. He experiences startup pain after sitting. He received a cortisone injection 8 years ago from [facility]. Pain Assessment  6/10/2021   Location of Pain Knee   Location Modifiers Right   Severity of Pain 6   Quality of Pain Other (Comment); Throbbing;Cracking   Quality of Pain Comment stiffness   Duration of Pain Persistent   Frequency of Pain Several times daily   Date Pain First Started (No Data)   Date Pain First Started Comment onset 3 weeks ago   Aggravating Factors Bending;Stretching;Walking;Stairs   Limiting Behavior Some   Relieving Factors Other (Comment)   Relieving Factors Comment ibuprofen   Result of Injury No     Occupation, etc:  Mr. Fred Celeste works as a  at the [employer]. His job is sedentary. He walks for exercise. He lives in [place] with his wife. He has 3 sons. Mr. Fred Celeste weighs 175 lbs and is 5'11\" tall. He is an insulin-dependent diabetic. He states his most recent A1C is 6.5.      Lab Results   Component Value Date/Time    Hemoglobin A1c 10.4 (H) 11/11/2010 03:15 PM     Weight Metrics 6/10/2021 2/1/2014 2/28/2012   Weight 197 lb 175 lb 177 lb   BMI 27.48 kg/m2 24.42 kg/m2 24 kg/m2       Patient Active Problem List   Diagnosis Code    Hypoglycemia E16.2     REVIEW OF SYSTEMS:    Constitutional Symptoms: Negative   Eyes: Negative   Ears, Nose, Throat and Mouth: Negative   Cardiovascular: Negative   Respiratory: Negative   Genitourinary: Per HPI   Gastrointestinal: Per HPI   Integumentary (Skin and/or Breast): Negative   Musculoskeletal: Per HPI   Endocrine/Rheumatologic: Negative   Neurological: Per HPI   Hematology/Lymphatic: Negative    Allergic/Immunologic: Negative   Phychiatric: Negative    Social History     Socioeconomic History    Marital status:      Spouse name: Not on file    Number of children: Not on file    Years of education: Not on file    Highest education level: Not on file   Occupational History    Not on file   Tobacco Use    Smoking status: Never Smoker    Smokeless tobacco: Never Used   Substance and Sexual Activity    Alcohol use: No    Drug use: Not on file    Sexual activity: Not on file   Other Topics Concern    Not on file   Social History Narrative    Not on file     Social Determinants of Health     Financial Resource Strain:     Difficulty of Paying Living Expenses:    Food Insecurity:     Worried About Running Out of Food in the Last Year:     Ran Out of Food in the Last Year:    Transportation Needs:     Lack of Transportation (Medical):  Lack of Transportation (Non-Medical):    Physical Activity:     Days of Exercise per Week:     Minutes of Exercise per Session:    Stress:     Feeling of Stress :    Social Connections:     Frequency of Communication with Friends and Family:     Frequency of Social Gatherings with Friends and Family:     Attends [employer] Services:     Active Member of Clubs or Organizations:     Attends Club or Organization Meetings:     Marital Status:    Intimate Partner Violence:     Fear of Current or Ex-Partner:     Emotionally Abused:     Physically Abused:     Sexually Abused:       No Known Allergies   Current Outpatient Medications   Medication Sig    ibuprofen (MOTRIN) 800 mg tablet Take 800 mg by mouth as needed for Pain.  insulin lispro (HUMALOG) 100 unit/mL injection by SubCUTAneous route.       Insulin Needles, Disposable, (NOVOFINE 30) 30 x 1/3 \" by Does Not Apply route.  insulin glargine (LANTUS) 100 unit/mL injection by SubCUTAneous route once.  lisinopril (PRINIVIL, ZESTRIL) 10 mg tablet Take  by mouth daily.  ondansetron (ZOFRAN ODT) 8 mg disintegrating tablet Take 1 Tab by mouth every eight (8) hours as needed for Nausea. (Patient not taking: Reported on 6/10/2021)    propranolol (INDERAL) 40 mg tablet Take 10 mg by mouth daily. (Patient not taking: Reported on 6/10/2021)    methimazole (TAPAZOLE) 10 mg tablet Take 5 mg by mouth two (2) times a day. (Patient not taking: Reported on 6/10/2021)     No current facility-administered medications for this visit.       PHYSICAL EXAMINATION:  Visit Vitals  Pulse 95   Temp 97.3 °F (36.3 °C) (Temporal)   Ht 5' 11\" (1.803 m)   Wt 197 lb (89.4 kg)   SpO2 98%   BMI 27.48 kg/m²      ORTHO EXAMINATION:  Examination Right knee Left knee   Skin Intact Intact   Range of motion 120-0 120-0   Effusion - -   Medial joint line tenderness + +   Lateral joint line tenderness - -   Popliteal tenderness - -   Osteophytes palpable + -   Elios - -   Patella crepitus - -   Anterior drawer - -   Lateral laxity - -   Medial laxity - -   Varus deformity - -   Valgus deformity - -   Pretibial edema - -   Calf tenderness - -         TIME OUT:  Chart reviewed for the following:   IMercy MD, have reviewed the History, Physical and updated the Allergic reactions for [address] performed immediately prior to start of procedure:  Grupo Breaux MD, have performed the following reviews on Ele Blum prior to the start of the procedure:          * Patient was identified by name and date of birth   * Agreement on procedure being performed was verified  * Risks and Benefits explained to the patient  * Procedure site verified and marked as necessary  * Patient was positioned for comfort  * Consent was obtained     Time: 2:18 PM     Date of procedure: 6/10/2021  Procedure performed by:  Neel Hoffmann MD  Mr. Johnny Augustin tolerated the procedure well with no complications. RADIOGRAPHS:  XR RIGHT KNEE 6/10/21 KAYLAH  IMPRESSION:  Three views with bilateral knees on AP view - No fractures, no effusion, moderate joint space narrowing, no osteophytes present. Kellgren Daniel grade 2. IMPRESSION:      ICD-10-CM ICD-9-CM    1. Chronic pain of right knee  M25.561 719.46 AMB POC X-RAY KNEE 3 VIEW    G89.29 338.29 betamethasone (CELESTONE) injection 3 mg      DRAIN/INJECT LARGE JOINT/BURSA      PROCEDURE AUTHORIZATION TO    2. Primary osteoarthritis of right knee  M17.11 715.16 betamethasone (CELESTONE) injection 3 mg      DRAIN/INJECT LARGE JOINT/BURSA      PROCEDURE AUTHORIZATION TO      PLAN:  After discussing treatment options, patient's right knee was injected with 4 cc Marcaine and 1/2 cc Celestone. He will follow up as needed. Consider visco supplementation if pain continues. There is no need for surgery at this time.         Scribed by Rosalba Steven ([address]) as dictated by Neel Hoffmann MD

## 2021-09-18 ENCOUNTER — HOSPITAL ENCOUNTER (INPATIENT)
Age: 56
LOS: 20 days | Discharge: HOME HEALTH | DRG: 638 | End: 2021-10-08
Attending: INTERNAL MEDICINE | Admitting: FAMILY MEDICINE
Payer: COMMERCIAL

## 2021-09-18 DIAGNOSIS — Z98.890 HISTORY OF INCISION AND DRAINAGE: ICD-10-CM

## 2021-09-18 DIAGNOSIS — L08.9 DIABETIC INFECTION OF RIGHT FOOT (HCC): Primary | ICD-10-CM

## 2021-09-18 DIAGNOSIS — Z79.4 TYPE 2 DIABETES MELLITUS WITHOUT COMPLICATION, WITH LONG-TERM CURRENT USE OF INSULIN (HCC): ICD-10-CM

## 2021-09-18 DIAGNOSIS — Z98.890 STATUS POST INCISION AND DRAINAGE: ICD-10-CM

## 2021-09-18 DIAGNOSIS — E55.9 VITAMIN D INSUFFICIENCY: ICD-10-CM

## 2021-09-18 DIAGNOSIS — E11.628 DIABETIC INFECTION OF RIGHT FOOT (HCC): Primary | ICD-10-CM

## 2021-09-18 DIAGNOSIS — I10 ESSENTIAL HYPERTENSION: ICD-10-CM

## 2021-09-18 DIAGNOSIS — E11.9 TYPE 2 DIABETES MELLITUS WITHOUT COMPLICATION, WITH LONG-TERM CURRENT USE OF INSULIN (HCC): ICD-10-CM

## 2021-09-18 LAB
GLUCOSE BLD STRIP.AUTO-MCNC: 241 MG/DL (ref 70–110)
GLUCOSE BLD STRIP.AUTO-MCNC: 317 MG/DL (ref 70–110)

## 2021-09-18 PROCEDURE — 2709999900 HC NON-CHARGEABLE SUPPLY

## 2021-09-18 PROCEDURE — 74011250636 HC RX REV CODE- 250/636: Performed by: FAMILY MEDICINE

## 2021-09-18 PROCEDURE — 82962 GLUCOSE BLOOD TEST: CPT

## 2021-09-18 PROCEDURE — 77030019934 HC DRSG VAC ASST KCON -B

## 2021-09-18 PROCEDURE — 65310000000 HC RM PRIVATE REHAB

## 2021-09-18 PROCEDURE — 74011250637 HC RX REV CODE- 250/637: Performed by: FAMILY MEDICINE

## 2021-09-18 PROCEDURE — 74011636637 HC RX REV CODE- 636/637: Performed by: FAMILY MEDICINE

## 2021-09-18 RX ORDER — LISINOPRIL 10 MG/1
10 TABLET ORAL DAILY
Status: DISCONTINUED | OUTPATIENT
Start: 2021-09-19 | End: 2021-09-19

## 2021-09-18 RX ORDER — CEPHALEXIN 250 MG/1
500 CAPSULE ORAL EVERY 6 HOURS
Status: COMPLETED | OUTPATIENT
Start: 2021-09-18 | End: 2021-09-28

## 2021-09-18 RX ORDER — CEPHALEXIN 500 MG/1
500 CAPSULE ORAL 4 TIMES DAILY
Status: ON HOLD | COMMUNITY
End: 2021-10-07 | Stop reason: CLARIF

## 2021-09-18 RX ORDER — OXYCODONE AND ACETAMINOPHEN 5; 325 MG/1; MG/1
1-2 TABLET ORAL
Status: DISCONTINUED | OUTPATIENT
Start: 2021-09-18 | End: 2021-09-20

## 2021-09-18 RX ORDER — INSULIN GLARGINE 100 [IU]/ML
20 INJECTION, SOLUTION SUBCUTANEOUS
Status: DISCONTINUED | OUTPATIENT
Start: 2021-09-18 | End: 2021-09-19

## 2021-09-18 RX ORDER — BISACODYL 5 MG
10 TABLET, DELAYED RELEASE (ENTERIC COATED) ORAL
Status: DISCONTINUED | OUTPATIENT
Start: 2021-09-18 | End: 2021-10-08 | Stop reason: HOSPADM

## 2021-09-18 RX ORDER — METOPROLOL TARTRATE 25 MG/1
25 TABLET, FILM COATED ORAL EVERY 12 HOURS
Status: DISCONTINUED | OUTPATIENT
Start: 2021-09-18 | End: 2021-09-20

## 2021-09-18 RX ORDER — ACETAMINOPHEN 325 MG/1
650 TABLET ORAL
Status: ON HOLD | COMMUNITY
End: 2021-10-07 | Stop reason: CLARIF

## 2021-09-18 RX ORDER — METHIMAZOLE 10 MG/1
10 TABLET ORAL DAILY
Status: DISCONTINUED | OUTPATIENT
Start: 2021-09-19 | End: 2021-09-20

## 2021-09-18 RX ORDER — HYDRALAZINE HYDROCHLORIDE 25 MG/1
25 TABLET, FILM COATED ORAL
Status: DISCONTINUED | OUTPATIENT
Start: 2021-09-18 | End: 2021-10-08 | Stop reason: HOSPADM

## 2021-09-18 RX ORDER — FACIAL-BODY WIPES
10 EACH TOPICAL
Status: DISCONTINUED | OUTPATIENT
Start: 2021-09-18 | End: 2021-09-20

## 2021-09-18 RX ORDER — DOCUSATE SODIUM 100 MG/1
100 CAPSULE, LIQUID FILLED ORAL 2 TIMES DAILY
Status: DISCONTINUED | OUTPATIENT
Start: 2021-09-18 | End: 2021-10-03

## 2021-09-18 RX ORDER — HEPARIN SODIUM 5000 [USP'U]/ML
5000 INJECTION, SOLUTION INTRAVENOUS; SUBCUTANEOUS EVERY 8 HOURS
Status: DISCONTINUED | OUTPATIENT
Start: 2021-09-18 | End: 2021-09-20

## 2021-09-18 RX ORDER — HEPARIN SODIUM 5000 [USP'U]/ML
5000 INJECTION, SOLUTION INTRAVENOUS; SUBCUTANEOUS EVERY 8 HOURS
Status: ON HOLD | COMMUNITY
End: 2021-10-07 | Stop reason: CLARIF

## 2021-09-18 RX ORDER — MAGNESIUM SULFATE 100 %
4 CRYSTALS MISCELLANEOUS AS NEEDED
Status: DISCONTINUED | OUTPATIENT
Start: 2021-09-18 | End: 2021-10-08 | Stop reason: HOSPADM

## 2021-09-18 RX ORDER — INSULIN GLARGINE 100 [IU]/ML
20 INJECTION, SOLUTION SUBCUTANEOUS
Status: ON HOLD | COMMUNITY
End: 2021-10-06 | Stop reason: CLARIF

## 2021-09-18 RX ORDER — DEXTROSE 50 % IN WATER (D50W) INTRAVENOUS SYRINGE
25-50 AS NEEDED
Status: DISCONTINUED | OUTPATIENT
Start: 2021-09-18 | End: 2021-10-08 | Stop reason: HOSPADM

## 2021-09-18 RX ORDER — INSULIN LISPRO 100 [IU]/ML
INJECTION, SOLUTION INTRAVENOUS; SUBCUTANEOUS
Status: DISCONTINUED | OUTPATIENT
Start: 2021-09-18 | End: 2021-09-20

## 2021-09-18 RX ORDER — ADHESIVE BANDAGE
30 BANDAGE TOPICAL DAILY PRN
Status: DISCONTINUED | OUTPATIENT
Start: 2021-09-18 | End: 2021-10-08 | Stop reason: HOSPADM

## 2021-09-18 RX ADMIN — DOCUSATE SODIUM 100 MG: 100 CAPSULE, LIQUID FILLED ORAL at 18:36

## 2021-09-18 RX ADMIN — METOPROLOL TARTRATE 25 MG: 25 TABLET, FILM COATED ORAL at 21:27

## 2021-09-18 RX ADMIN — CEPHALEXIN 500 MG: 250 CAPSULE ORAL at 18:36

## 2021-09-18 RX ADMIN — CEPHALEXIN 500 MG: 250 CAPSULE ORAL at 23:49

## 2021-09-18 RX ADMIN — HEPARIN SODIUM 5000 UNITS: 5000 INJECTION, SOLUTION INTRAVENOUS; SUBCUTANEOUS at 18:29

## 2021-09-18 RX ADMIN — OXYCODONE HYDROCHLORIDE AND ACETAMINOPHEN 2 TABLET: 5; 325 TABLET ORAL at 21:27

## 2021-09-18 RX ADMIN — INSULIN GLARGINE 20 UNITS: 100 INJECTION, SOLUTION SUBCUTANEOUS at 21:28

## 2021-09-18 RX ADMIN — INSULIN LISPRO 8 UNITS: 100 INJECTION, SOLUTION INTRAVENOUS; SUBCUTANEOUS at 21:28

## 2021-09-19 LAB
25(OH)D3 SERPL-MCNC: 24 NG/ML (ref 30–100)
ALBUMIN SERPL-MCNC: 1.8 G/DL (ref 3.4–5)
ALBUMIN/GLOB SERPL: 0.4 {RATIO} (ref 0.8–1.7)
ALP SERPL-CCNC: 163 U/L (ref 45–117)
ALT SERPL-CCNC: 33 U/L (ref 16–61)
ANION GAP SERPL CALC-SCNC: 8 MMOL/L (ref 3–18)
AST SERPL-CCNC: 27 U/L (ref 10–38)
BASOPHILS # BLD: 0 K/UL (ref 0–0.1)
BASOPHILS NFR BLD: 1 % (ref 0–2)
BILIRUB SERPL-MCNC: 0.3 MG/DL (ref 0.2–1)
BUN SERPL-MCNC: 13 MG/DL (ref 7–18)
BUN/CREAT SERPL: 16 (ref 12–20)
CALCIUM SERPL-MCNC: 8.6 MG/DL (ref 8.5–10.1)
CHLORIDE SERPL-SCNC: 103 MMOL/L (ref 100–111)
CO2 SERPL-SCNC: 28 MMOL/L (ref 21–32)
CREAT SERPL-MCNC: 0.82 MG/DL (ref 0.6–1.3)
DIFFERENTIAL METHOD BLD: ABNORMAL
EOSINOPHIL # BLD: 0.1 K/UL (ref 0–0.4)
EOSINOPHIL NFR BLD: 1 % (ref 0–5)
ERYTHROCYTE [DISTWIDTH] IN BLOOD BY AUTOMATED COUNT: 14.1 % (ref 11.6–14.5)
GLOBULIN SER CALC-MCNC: 5.1 G/DL (ref 2–4)
GLUCOSE BLD STRIP.AUTO-MCNC: 133 MG/DL (ref 70–110)
GLUCOSE BLD STRIP.AUTO-MCNC: 144 MG/DL (ref 70–110)
GLUCOSE BLD STRIP.AUTO-MCNC: 198 MG/DL (ref 70–110)
GLUCOSE BLD STRIP.AUTO-MCNC: 243 MG/DL (ref 70–110)
GLUCOSE BLD STRIP.AUTO-MCNC: 47 MG/DL (ref 70–110)
GLUCOSE BLD STRIP.AUTO-MCNC: 67 MG/DL (ref 70–110)
GLUCOSE BLD STRIP.AUTO-MCNC: 81 MG/DL (ref 70–110)
GLUCOSE SERPL-MCNC: 39 MG/DL (ref 74–99)
HCT VFR BLD AUTO: 32 % (ref 36–48)
HGB BLD-MCNC: 10.4 G/DL (ref 13–16)
LYMPHOCYTES # BLD: 1.5 K/UL (ref 0.9–3.6)
LYMPHOCYTES NFR BLD: 19 % (ref 21–52)
MCH RBC QN AUTO: 31.2 PG (ref 24–34)
MCHC RBC AUTO-ENTMCNC: 32.5 G/DL (ref 31–37)
MCV RBC AUTO: 96.1 FL (ref 78–100)
MONOCYTES # BLD: 0.9 K/UL (ref 0.05–1.2)
MONOCYTES NFR BLD: 11 % (ref 3–10)
NEUTS SEG # BLD: 5.4 K/UL (ref 1.8–8)
NEUTS SEG NFR BLD: 67 % (ref 40–73)
PLATELET # BLD AUTO: 702 K/UL (ref 135–420)
PMV BLD AUTO: 10 FL (ref 9.2–11.8)
POTASSIUM SERPL-SCNC: 4.1 MMOL/L (ref 3.5–5.5)
PROT SERPL-MCNC: 6.9 G/DL (ref 6.4–8.2)
RBC # BLD AUTO: 3.33 M/UL (ref 4.35–5.65)
SODIUM SERPL-SCNC: 139 MMOL/L (ref 136–145)
T4 FREE SERPL-MCNC: 1.3 NG/DL (ref 0.7–1.5)
TSH SERPL DL<=0.05 MIU/L-ACNC: 1.64 UIU/ML (ref 0.36–3.74)
WBC # BLD AUTO: 8 K/UL (ref 4.6–13.2)

## 2021-09-19 PROCEDURE — 82306 VITAMIN D 25 HYDROXY: CPT

## 2021-09-19 PROCEDURE — 2709999900 HC NON-CHARGEABLE SUPPLY

## 2021-09-19 PROCEDURE — 74011636637 HC RX REV CODE- 636/637: Performed by: FAMILY MEDICINE

## 2021-09-19 PROCEDURE — 84439 ASSAY OF FREE THYROXINE: CPT

## 2021-09-19 PROCEDURE — 36415 COLL VENOUS BLD VENIPUNCTURE: CPT

## 2021-09-19 PROCEDURE — 74011250637 HC RX REV CODE- 250/637: Performed by: FAMILY MEDICINE

## 2021-09-19 PROCEDURE — 84443 ASSAY THYROID STIM HORMONE: CPT

## 2021-09-19 PROCEDURE — 97166 OT EVAL MOD COMPLEX 45 MIN: CPT

## 2021-09-19 PROCEDURE — 74011250636 HC RX REV CODE- 250/636: Performed by: FAMILY MEDICINE

## 2021-09-19 PROCEDURE — 85025 COMPLETE CBC W/AUTO DIFF WBC: CPT

## 2021-09-19 PROCEDURE — 97535 SELF CARE MNGMENT TRAINING: CPT

## 2021-09-19 PROCEDURE — 82962 GLUCOSE BLOOD TEST: CPT

## 2021-09-19 PROCEDURE — 80053 COMPREHEN METABOLIC PANEL: CPT

## 2021-09-19 PROCEDURE — 65310000000 HC RM PRIVATE REHAB

## 2021-09-19 RX ORDER — INSULIN GLARGINE 100 [IU]/ML
15 INJECTION, SOLUTION SUBCUTANEOUS
Status: DISCONTINUED | OUTPATIENT
Start: 2021-09-19 | End: 2021-09-20

## 2021-09-19 RX ORDER — FAMOTIDINE 20 MG/1
20 TABLET, FILM COATED ORAL 2 TIMES DAILY
Status: DISCONTINUED | OUTPATIENT
Start: 2021-09-19 | End: 2021-10-08 | Stop reason: HOSPADM

## 2021-09-19 RX ORDER — LISINOPRIL 20 MG/1
20 TABLET ORAL DAILY
Status: DISCONTINUED | OUTPATIENT
Start: 2021-09-19 | End: 2021-09-20

## 2021-09-19 RX ADMIN — INSULIN LISPRO 3 UNITS: 100 INJECTION, SOLUTION INTRAVENOUS; SUBCUTANEOUS at 22:08

## 2021-09-19 RX ADMIN — HEPARIN SODIUM 5000 UNITS: 5000 INJECTION, SOLUTION INTRAVENOUS; SUBCUTANEOUS at 10:10

## 2021-09-19 RX ADMIN — METOPROLOL TARTRATE 25 MG: 25 TABLET, FILM COATED ORAL at 08:48

## 2021-09-19 RX ADMIN — CEPHALEXIN 500 MG: 250 CAPSULE ORAL at 11:33

## 2021-09-19 RX ADMIN — FAMOTIDINE 20 MG: 20 TABLET, FILM COATED ORAL at 17:31

## 2021-09-19 RX ADMIN — CEPHALEXIN 500 MG: 250 CAPSULE ORAL at 23:52

## 2021-09-19 RX ADMIN — FAMOTIDINE 20 MG: 20 TABLET, FILM COATED ORAL at 12:38

## 2021-09-19 RX ADMIN — DOCUSATE SODIUM 100 MG: 100 CAPSULE, LIQUID FILLED ORAL at 17:30

## 2021-09-19 RX ADMIN — METOPROLOL TARTRATE 25 MG: 25 TABLET, FILM COATED ORAL at 22:08

## 2021-09-19 RX ADMIN — OXYCODONE HYDROCHLORIDE AND ACETAMINOPHEN 1 TABLET: 5; 325 TABLET ORAL at 10:09

## 2021-09-19 RX ADMIN — CEPHALEXIN 500 MG: 250 CAPSULE ORAL at 17:30

## 2021-09-19 RX ADMIN — OXYCODONE HYDROCHLORIDE AND ACETAMINOPHEN 2 TABLET: 5; 325 TABLET ORAL at 15:32

## 2021-09-19 RX ADMIN — LISINOPRIL 20 MG: 20 TABLET ORAL at 08:48

## 2021-09-19 RX ADMIN — INSULIN LISPRO 6 UNITS: 100 INJECTION, SOLUTION INTRAVENOUS; SUBCUTANEOUS at 17:08

## 2021-09-19 RX ADMIN — HEPARIN SODIUM 5000 UNITS: 5000 INJECTION, SOLUTION INTRAVENOUS; SUBCUTANEOUS at 17:31

## 2021-09-19 RX ADMIN — DOCUSATE SODIUM 100 MG: 100 CAPSULE, LIQUID FILLED ORAL at 08:48

## 2021-09-19 RX ADMIN — CEPHALEXIN 500 MG: 250 CAPSULE ORAL at 05:40

## 2021-09-19 RX ADMIN — INSULIN GLARGINE 15 UNITS: 100 INJECTION, SOLUTION SUBCUTANEOUS at 22:07

## 2021-09-19 RX ADMIN — HEPARIN SODIUM 5000 UNITS: 5000 INJECTION, SOLUTION INTRAVENOUS; SUBCUTANEOUS at 01:51

## 2021-09-19 RX ADMIN — METHIMAZOLE 10 MG: 10 TABLET ORAL at 08:47

## 2021-09-19 NOTE — PROGRESS NOTES
Problem: Pressure Injury - Risk of  Goal: *Prevention of pressure injury  Description: Document Sarabjit Scale and appropriate interventions in the flowsheet. Outcome: Progressing Towards Goal  Note: Pressure Injury Interventions:  Sensory Interventions: Assess changes in LOC         Activity Interventions: Chair cushion    Mobility Interventions: Chair cushion    Nutrition Interventions: Document food/fluid/supplement intake                     Problem: Patient Education: Go to Patient Education Activity  Goal: Patient/Family Education  Outcome: Progressing Towards Goal     Problem: Falls - Risk of  Goal: *Absence of Falls  Description: Document Adama Cease Fall Risk and appropriate interventions in the flowsheet.   Outcome: Progressing Towards Goal  Note: Fall Risk Interventions:  Mobility Interventions: Bed/chair exit alarm         Medication Interventions: Bed/chair exit alarm    Elimination Interventions: Call light in reach

## 2021-09-19 NOTE — PROGRESS NOTES
Problem: Self Care Deficits Care Plan (Adult)  Goal: *Acute Goals and Plan of Care (Insert Text)  Description: Occupational Therapy Goals   Long Term Goals  Initiated 2021 and to be accomplished within 3 week(s), by 10/10/2021. 1. Pt will perform self-feeding with I.  2. Pt will perform grooming with I.  3. Pt will perform UB bathing with Mod I and use of AE PRN. 4. Pt will perform LB bathing with Mod I and use of AE PRN. 5. Pt will perform tub/shower transfer with supv and use of LRAD. 6. Pt will perform UB dressing with Mod I.  7. Pt will perform LB dressing with supv and use of AE PRN. 8. Pt will perform toileting task with Mod I.  9. Pt will perform toilet transfer with supv and use of LRAD. Short Term Goals   Initiated 2021 and to be accomplished within 7 day(s), by 2021. 1. Pt will perform self-feeding with Mod I.   2. Pt will perform grooming with set-up. 3. Pt will perform UB bathing with SBA and use of AE PRN. 4. Pt will perform LB bathing with Min A and use of AE PRN. 5. Pt will perform tub/shower transfer with Min A and use of LRAD. 6. Pt will perform UB dressing with SBA. 7. Pt will perform LB dressing with Mod A and use of AE PRN. 8. Pt will perform toileting task with Mod A.  9. Pt will perform toilet transfer with CGA and use of LRAD. Outcome: Progressing Towards Goal   OCCUPATIONAL THERAPY EVALUATION    Patient: Mary Garcia 60 y.o. Date: 2021  Primary Diagnosis: Diabetic foot infection ([facility] [address] [E11.628, L08.9]    Patient identified with name and : yes    Past Medical History:   Past Medical History:   Diagnosis Date    Diabetes ([facility] [place]     Diabetes mellitus     Essential hypertension     Hypertension     Urinary retention      Precautions: fall risk, weight bearing restrictions (NWB R LE)    Time In: 08  Time Out: 930    Pain:  Pt reports 3/10 pain or discomfort prior to treatment; wound RLE.  Care coordinated with staff nurse Kalina Mcdonald RN), nsg to follow-up regarding pain medication. Pt reports 3/10 pain or discomfort post treatment; wound RLE. SUBJECTIVE:   Patient stated I was working full-time.  Pt is currently employed full-time as a [employer].      OBJECTIVE DATA SUMMARY:   VS /86, HR 94 bpm, O2 Sat 100% on room air    [x]  Right hand dominant   []  Left hand dominant    Therapy Diagnosis:   Difficulty with ADLs  [x]     Difficulty with functional transfers  [x]     Difficulty with ambulation  [x]     Difficulty with IADLs  [x]       Problem List:    Decreased strength B UE  [x]     Decreased strength trunk/core  [x]     Decreased AROM   [x]     Decreased endurance  [x]     Decreased balance sitting  [x]     Decreased balance standing  [x]     Pain   [x]     Decreased PROM  []       Functional Limitations:   Decreased independence with ADL  [x]     Decreased independence with functional transfers  [x]     Decreased independence with ambulation  [x]     Decreased independence with IADL  [x]       Previous Functional Level: Pre-Morbid Level of Function: (I) with ADLs/IADLs PLOF; worked full-time as a kompany    Home Environment: [address]: Private residence  # Steps to Enter: 4  Rails to Enter: Yes  Hand Rails : Bilateral (spaced-widely apart)  Wheelchair Ramp: No  One/Two Story Residence: One story  Living Alone: No  Support Systems: Child(roberto), Other Family Member(s)  Patient Expects to be Discharged to[de-identified] House  Current DME Used/Available at Home: None  Tub or Shower Type: Tub/Shower combination    Barriers to Learning/Limitations: yes;  physical, sensation/diabetic neuropathy  Compensate with: visual, verbal, tactile, kinesthetic cues/model    Outcome Measures:      MMT Initial Assessment   Right Upper Extremity  Left Upper Extremity    UE AROM RUE AROM WFL LUE AROM WFL   Shoulder flexion 4-/5 4-/5   Shoulder extension     Shoulder ABDuction 4-/5 4-/5   Shoulder ADDUction     Elbow Flexion 4-/5 4-/5   Elbow Extension     Wrist Extension/Flexion 4-/5 4-/5    4-/5 4-/5   0/5 No palpable muscle contraction  1/5 Palpable muscle contraction, no joint movement  2-/5 Less than full range of motion in gravity eliminated position  2/5 Able to complete full range of motion in gravity eliminated position  2+/5 Able to initiate movement against gravity  3-/5 More than half but not full range of motion against gravity  3/5 Able to complete full range of motion against gravity  3+/5 Completes full range of motion against gravity with minimal resistance  4-/5 Completes full range of motion against gravity with minimal resistance  4/5 Completes full range of motion against gravity with moderate resistance  5/5 Completes full range of motion against gravity with maximum resistance    Sensation: intact to light touch; diabetic neuropathy (generally decreased) functional  Coordination: R/LUE FMC appears WFL; coordination (generally decreased) functional    FIM SCORES Initial Assessment   Bladder - level of assist     Bladder - accident frequency score     Bowel - level of assist     Bowel - accident frequency score     Please see [facility] Interdisciplinary Eval: Coordination/Balance Section for details regarding FIM score description.     COGNITION/PERCEPTION Initial Assessment   Reading Status     Writing Status     Arousal/Alertness  (Alert & oriented)   Orientation Level Oriented to person, Oriented to place, Oriented to situation   Visual Fields     Praxis     Body Scheme       COMPREHENSION MODE Initial Assessment   Primary Mode of Comprehension Auditory   Hearing Aide None   Corrective Lenses     Score 5     EXPRESSION Initial Assessment   Primary Mode of Expression Verbal   Score 5   Comments       SOCIAL INTERACTION/PRAGMATICS Initial Assessment   Score 5   Comments       PROBLEM SOLVING Initial Assessment   Score 5   Comments       MEMORY Initial Assessment   Score 5   Comments       EATING Initial Assessment   Functional Level 5 Comments set-up     GROOMING Initial Assessment   Functional Level 5    Oral Hygiene FIM: 5 set-up/supv   Tasks completed by patient Brushed teeth, Washed face, Washed hands   Comments Supv/ set-up for grooming tasks     BATHING Initial Assessment   Functional Level 3   Body parts patient bathed Abdomen, Arm, left, Arm, right, Buttocks, Chest, Zee area   Comments       TUB/SHOWER TRANSFER INDEPENDENCE Initial Assessment   Score 0   Comments Did not assess due to safety (NWB RLE) with wound vac intact R foot     UPPER BODY DRESSING/UNDRESSING Initial Assessment   Functional Level 4   Items applied/Steps completed Pullover (4 steps)   Comments SBA/ Min A       LOWER BODY DRESSING/UNDRESSING Initial Assessment   Functional Level 2    Sock and/or Shoe Management FIM: 4 Min (doff/don slipper sock left foot)   Items applied/Steps completed Elastic waist pants (3 steps), Shoe, left (1 step), Underpants (3 steps)   Comments Care coordinated with Patria Fitzgerald RN for nurse to disconnect wound vac in order to don/doff boxers and elastic waist shorts; Min A to don L sock (1 step)     TOILETING Initial Assessment   Functional Level 2   Comments BSC at the bedside; CM & hygiene performed in stance with RW while adhering to NWB R LE; Max A for pants down/up     TOILET TRANSFER INDEPENDENCE Initial Assessment   Transfer score 4   Comments Min A w/ RW (stand pivot LLE); NWB R LE     INSTRUMENTAL ADL Initial Assessment (PLOF)   Meal preparation Independent (Per patient and pt's wife patient was (I) w/ ADLs/IADLs PLOF)   Homemaking Independent   Medicine Management Independent   Financial Management Independent        ASSESSMENT :  Based on the objective data described above, the patient presents with decreased (I) and safety w/ ADLs, impaired balance, decreased activity tolerance, decreased strength/endurance, impaired coordination, impaired mobility/transfers, impaired ROM, and weight bearing restrictions limiting (I) and safety with ADLs/IADLs for return to PLOF. Pt seen for initial OT eval and tx/ADL session. Edu regarding role of OT on ARU with POC established. Pt agreeable to participate in ADL session for assessing current functional ability and performance levels. UB ADLs performed SBA/ Min A level; LB ADLs requiring Mod to Max A 2/2 weight bearing restrictions. Care coordinated with staff nurse ([address] [place] [address], RN) for managing R LE wound vac during doff/donning of boxers and elastic waist shorts. Bed mobility performed CGA/ Min A level 2/2 wound vac and for managing tubing. Sit to stand transitioning from EOB up to RW performed CGA/ Min A level. Stand pivot/hop transfer using RW to [facility] at bedside performed with Min A for assisting with managing wound vac tubing and verbal cues for maintaining NWB R LE and vc's for body positioning and hand placement. Pt will benefit from skilled OT interventions for ADL training, functional mobility/transfer training using LRAD, edu/instruction weight bearing restrictions, edu/instruction in use of AE for LB ADLs, strengthening/coordination, balance training, safety awareness/fall prevention edu, and improved activity tolerance for increased safety and (I) with ADLs and functional tasks. Pt would benefit from skilled occupational therapy in order to improve independent functional mobility/ADLs,/IADLs within the home. Interventions may include range of motion (AROM, PROM B UE), motor function (B UE/ strengthening/coordination), activity tolerance (vitals, oxygen saturation levels), balance training, ADL/IADL training and functional transfer training. Please see IRC; Interdisciplinary Eval, Care Plan, and Patient Education for further information regarding occupational therapy examination and plan of care.       PLAN :  Recommendations and Planned Interventions:  [x]               Self Care Training                   [x]        Therapeutic Activities  [x]               Functional Mobility Training    [x] Cognitive Retraining  [x]               Therapeutic Exercises            [x]        Endurance Activities  [x]               Balance Training                     [x]        Neuromuscular Re-Education  []               Visual/Perceptual Training      [x]   Home Safety Training  [x]               Patient Education                    [x]        Family Training/Education  []               Other (comment):    Frequency/Duration: Patient will be followed by occupational therapy 1-2 times per day/4-7 days per week to address goals. Discharge Recommendations: Home Health w/ support  Further Equipment Recommendations for Discharge: bedside commode, gait belt, and transfer bench     Please refer to the flow sheet for vital signs taken during this treatment. After treatment:   [] Patient left in no apparent distress sitting up in chair  [x] Patient left in no apparent distress in bed  [x] Call bell left within reach  [x] Nursing notified  [x] Family member present (patient's Madison Hamilton)  [x] Bed alarm activated    COMMUNICATION/EDUCATION:   [] Home safety education was provided and the patient/caregiver indicated understanding. [x] Patient/family have participated as able in goal setting and plan of care. [x] Patient/family agree to work toward stated goals and plan of care. [] Patient understands intent and goals of therapy, but is neutral about his/her participation. [] Patient is unable to participate in goal setting and plan of care. Order received from MD for occupational therapy services and chart reviewed. Pt to be seen 5 times per week for 3 hours of total therapy per day for 3 weeks.   Thank you for the referral.    Thank you for this referral.  Lacretia Denver, OT

## 2021-09-19 NOTE — PROGRESS NOTES
Problem: Pressure Injury - Risk of  Goal: *Prevention of pressure injury  Description: Document Sarabjit Scale and appropriate interventions in the flowsheet. Outcome: Progressing Towards Goal  Note: Pressure Injury Interventions:  Sensory Interventions: Chair cushion         Activity Interventions: Chair cushion, Increase time out of bed, Pressure redistribution bed/mattress(bed type)    Mobility Interventions: Chair cushion, HOB 30 degrees or less, Pressure redistribution bed/mattress (bed type)    Nutrition Interventions: Document food/fluid/supplement intake                     Problem: Patient Education: Go to Patient Education Activity  Goal: Patient/Family Education  Outcome: Progressing Towards Goal     Problem: Falls - Risk of  Goal: *Absence of Falls  Description: Document Antoinette Smith Fall Risk and appropriate interventions in the flowsheet.   Outcome: Progressing Towards Goal  Note: Fall Risk Interventions:  Mobility Interventions: Bed/chair exit alarm         Medication Interventions: Bed/chair exit alarm, Evaluate medications/consider consulting pharmacy, Patient to call before getting OOB    Elimination Interventions: Call light in reach, Bed/chair exit alarm, Patient to call for help with toileting needs              Problem: Patient Education: Go to Patient Education Activity  Goal: Patient/Family Education  Outcome: Progressing Towards Goal

## 2021-09-19 NOTE — ROUTINE PROCESS
Inocente Latham is a 60 y.o.  male admitted on 9/18/2021 from [facility]. [facility] [address]. Transportation was provided by ambulance, and the patient was transferred to his room via [employer]. Patient was assisted to bed with assist of 2. The patient was oriented to his room. Fall risk precautions were discussed with the patient; he was instructed to call for help prior to getting up. The following fall risk precautions were initiated: bed/ chair alarms, door signage, yellow bracelet and socks as well as completion of the Starleen Freeze tool in the patient's room. Four eyes nurse skin assessment was performed by Melani Gambino RN and Malachi Ahn RN Skin problems noted: YES.  Large right foot wound to be documented when wound vac placed by Juan Manuel Cespedes RN (night shift nurse)    Vikram Swift RN

## 2021-09-19 NOTE — PROGRESS NOTES
Problem: Pressure Injury - Risk of  Goal: *Prevention of pressure injury  Description: Document Sarabjit Scale and appropriate interventions in the flowsheet.   9/19/2021 0044 by Emily Middleton RN  Outcome: Progressing Towards Goal  Note: Pressure Injury Interventions:  Sensory Interventions: Assess changes in LOC         Activity Interventions: Chair cushion    Mobility Interventions: Chair cushion    Nutrition Interventions: Document food/fluid/supplement intake                  9/19/2021 0043 by Emily Middleton RN  Outcome: Progressing Towards Goal  Note: Pressure Injury Interventions:  Sensory Interventions: Assess changes in LOC         Activity Interventions: Chair cushion    Mobility Interventions: Chair cushion    Nutrition Interventions: Document food/fluid/supplement intake                     Problem: Patient Education: Go to Patient Education Activity  Goal: Patient/Family Education  9/19/2021 0044 by Emily Middleton RN  Outcome: Progressing Towards Goal  9/19/2021 0043 by Emily Middleton RN  Outcome: Progressing Towards Goal

## 2021-09-19 NOTE — H&P
History & Physical    Patient: Asad Cardoso MRN: [id number]  CSN: [id number]    YOB: 1965  Age: 60 y.o. Sex: male      DOA: 9/18/2021    Inpatient Rehab    Impairment group: Infection  GIDEON: Miscellaneous  Diagnosis: Cellulitis of Rt lower limb          HPI:     Asad Cardoso is a 60 y.o.  male who has history of diabetes mellitus hypertension hypothyroid patient was seen and [facility] ER often seen at primary care office. Patient has history of diabetes mellitus on insulin seen by Dr. Deloris De Jesus. Patient was last seen in our office about 1 year ago for Rt foot cellulitis . Patient reported that he was giving insulin pump by endocrinology but he has not been using it. Patient started to have blisters on the right foot progressively was getting worse patient also had a swelling in the lower extremity. pt was sent to ER for further evaluation     Patient was transferred to [facility] had incision and drainage by podiatry started on broad-spectrum antibiotic. Antibiotic was adjusted and was discharged on Keflex 500 mg every 6 hour for 10 days. Patient had wound VAC order on discharge and nonweightbearing to the right foot    During his hospital stay patient was found to be hyponatremic and to have hypertensive emergency    Patient was evaluated by PT and OT recommended inpatient rehab patient was admitted to [facility] inpatient rehab 9/18/2021    Glucose level was running low he had Lantus 20 U Sc last night will decrease dose to 15 . Pt reported he missed dinner last night he ate small dinner   Pt also reported that  has been on DVT prophylaxis in [facility] was stopped wednesday due to some bleeding from wound restarted again yesterday.    Pt had generalized swelling and edema in the scrotal area with IV fluid and is improving now   Will monitor blood work up          Past Medical History:   Diagnosis Date    Diabetes ([address]     Diabetes mellitus     Essential hypertension     Hypertension     Urinary retention        Past Surgical History:   Procedure Laterality Date    HX OTHER SURGICAL      surgery on left eye       Family History   Problem Relation Age of Onset    Cancer Father        Social History     Socioeconomic History    Marital status:      Spouse name: Not on file    Number of children: Not on file    Years of education: Not on file    Highest education level: Not on file   Tobacco Use    Smoking status: Never Smoker    Smokeless tobacco: Never Used   Substance and Sexual Activity    Alcohol use: No     Social Determinants of Health     Financial Resource Strain:     Difficulty of Paying Living Expenses:    Food Insecurity:     Worried About Running Out of Food in the Last Year:     [address] in the Last Year:    Transportation Needs:     Lack of Transportation (Medical):  Lack of Transportation (Non-Medical):    Physical Activity:     Days of Exercise per Week:     Minutes of Exercise per Session:    Stress:     Feeling of Stress :    Social Connections:     Frequency of Communication with Friends and Family:     Frequency of Social Gatherings with Friends and Family:     Attends [employer] Services:     Active Member of Clubs or Organizations:     Attends Club or Organization Meetings:     Marital Status:        Prior to Admission medications    Medication Sig Start Date End Date Taking? Authorizing Provider   acetaminophen (TYLENOL) 325 mg tablet Take 650 mg by mouth every four (4) hours as needed for Pain. Indications: pain   Yes Provider, Historical   cephALEXin (KEFLEX) 500 mg capsule Take 500 mg by mouth four (4) times daily. Indications: an infection of the skin and the tissue below the skin   Yes Provider, Historical   propranolol HCl (INDERAL LA PO) Take 10 mg by mouth daily. Yes Provider, Historical   insulin lispro (HUMALOG) 100 unit/mL injection by SubCUTAneous route.      Yes Provider, Historical methimazole (TAPAZOLE) 10 mg tablet Take 5 mg by mouth two (2) times a day. Yes Provider, Historical   Insulin Needles, Disposable, (NOVOFINE 30) 30 x 1/3 \" by Does Not Apply route. Yes Provider, Historical   lisinopril (PRINIVIL, ZESTRIL) 10 mg tablet Take 10 mg by mouth daily. Yes Provider, Historical   insulin glargine (Lantus Solostar U-100 Insulin) 100 unit/mL (3 mL) inpn 20 Units by SubCUTAneous route nightly. Indications: type 2 diabetes mellitus  Patient not taking: Reported on 9/18/2021    Provider, Historical   heparin sodium,porcine (heparin, porcine,) 5,000 unit/mL injection 5,000 Units every eight (8) hours. Indications: deep vein thrombosis prevention, blood clot in a deep vein of the extremities  Patient not taking: Reported on 9/18/2021    Provider, Historical   ibuprofen (MOTRIN) 800 mg tablet Take 800 mg by mouth as needed for Pain. Patient not taking: Reported on 9/18/2021    Provider, Historical   ondansetron (ZOFRAN ODT) 8 mg disintegrating tablet Take 1 Tab by mouth every eight (8) hours as needed for Nausea. Patient not taking: Reported on 6/10/2021 2/1/14   Chavo Leroy MD   propranolol (INDERAL) 40 mg tablet Take 10 mg by mouth daily. Patient not taking: Reported on 9/18/2021    Provider, Historical   insulin glargine (LANTUS) 100 unit/mL injection by SubCUTAneous route once. Patient not taking: Reported on 9/18/2021    Provider, Historical       No Known Allergies    Review of Systems  GENERAL: Patient alert, awake and oriented times 3, able to communicate full sentences and not in distress. HEENT: No change in vision, no earache, tinnitus, sore throat or sinus congestion. NECK: No pain or stiffness. CARDIOVASCULAR: No chest pain or pressure. No palpitations. PULMONARY: No shortness of breath, cough or wheeze. GASTROINTESTINAL: No abdominal pain, nausea, vomiting or diarrhea, melena or       bright red blood per rectum.  Positive constipation  GENITOURINARY: No urinary frequency, urgency, hesitancy or dysuria. H/o urine retention  And scrotal swelling per hospital record pt reported was told due to Iv fluid in the hospital  MUSCULOSKELETAL:wound VAC Rt foot  DERMATOLOGIC: No rash, no itching, no lesions. ENDOCRINE: No polyuria, polydipsia, no heat or cold intolerance. No recent change in    weight. HEMATOLOGICAL: No anemia or easy bruising or bleeding. NEUROLOGIC: No headache, seizures, generalized weakness   PSYCHIATRIC: No depression, anxiety, mood disorder, no loss of interest in normal       activity or change in sleep pattern. Physical Exam:     Physical Exam:  Visit Vitals  BP (!) 155/87 (BP 1 Location: Right upper arm)   Pulse 100   Temp 99.4 °F (37.4 °C)   Resp 18   Ht 5' 11\" (1.803 m)   Wt 96 kg (211 lb 9.6 oz)   SpO2 99%   BMI 29.51 kg/m²           Temp (24hrs), Av.4 °F (37.4 °C), Min:99.3 °F (37.4 °C), Max:99.4 °F (37.4 °C)    No intake/output data recorded.  1901 -  0700  In: 150 [P.O.:150]  Out: -     General:  Alert, cooperative, no distress, appears stated age. Head:  Normocephalic, without obvious abnormality, atraumatic. Eyes:  Conjunctivae/corneas clear. PERRL, EOMs intact. Nose: Nares normal. No drainage or sinus tenderness. Throat: Lips, mucosa, and tongue dry   Neck: Supple, symmetrical, trachea midline, no adenopathy, thyroid: no enlargement/tenderness/nodules, no carotid bruit and no JVD. Back:   ROM normal. No CVA tenderness. Lungs:   Clear to auscultation bilaterally. Chest wall:  No tenderness or deformity. Heart:  Regular rate and rhythm, S1, S2 normal, no murmur, click, rub or gallop. Abdomen: Soft slightly distended , non-tender. Sight abdominal distention  Bowel sounds normal. No masses,  No organomegaly. Extremities: Extremities S/P I&D Rt foot no cyanosis or edema. Wound vac Rt foot   Pulses: 2+ and symmetric all extremities.    Skin: Skin color, texture, turgor normal. Cellulitis Rt foot improving    Neurologic: CNII-XII intact. No focal motor or sensory deficit. Labs Reviewed: All lab results for the last 24 hours reviewed. Recent Results (from the past 24 hour(s))   GLUCOSE, POC    Collection Time: 09/18/21  6:12 PM   Result Value Ref Range    Glucose (POC) 241 (H) 70 - 110 mg/dL   GLUCOSE, POC    Collection Time: 09/18/21  9:15 PM   Result Value Ref Range    Glucose (POC) 317 (H) 70 - 110 mg/dL   CBC WITH AUTOMATED DIFF    Collection Time: 09/19/21  5:31 AM   Result Value Ref Range    WBC 8.0 4.6 - 13.2 K/uL    RBC 3.33 (L) 4.35 - 5.65 M/uL    HGB 10.4 (L) 13.0 - 16.0 g/dL    HCT 32.0 (L) 36.0 - 48.0 %    MCV 96.1 78.0 - 100.0 FL    MCH 31.2 24.0 - 34.0 PG    MCHC 32.5 31.0 - 37.0 g/dL    RDW 14.1 11.6 - 14.5 %    PLATELET 229 (H) 213 - 420 K/uL    MPV 10.0 9.2 - 11.8 FL    NEUTROPHILS 67 40 - 73 %    LYMPHOCYTES 19 (L) 21 - 52 %    MONOCYTES 11 (H) 3 - 10 %    EOSINOPHILS 1 0 - 5 %    BASOPHILS 1 0 - 2 %    ABS. NEUTROPHILS 5.4 1.8 - 8.0 K/UL    ABS. LYMPHOCYTES 1.5 0.9 - 3.6 K/UL    ABS. MONOCYTES 0.9 0.05 - 1.2 K/UL    ABS. EOSINOPHILS 0.1 0.0 - 0.4 K/UL    ABS. BASOPHILS 0.0 0.0 - 0.1 K/UL    DF AUTOMATED     METABOLIC PANEL, COMPREHENSIVE    Collection Time: 09/19/21  5:31 AM   Result Value Ref Range    Sodium 139 136 - 145 mmol/L    Potassium 4.1 3.5 - 5.5 mmol/L    Chloride 103 100 - 111 mmol/L    CO2 28 21 - 32 mmol/L    Anion gap 8 3.0 - 18 mmol/L    Glucose 39 (LL) 74 - 99 mg/dL    BUN 13 7.0 - 18 MG/DL    Creatinine 0.82 0.6 - 1.3 MG/DL    BUN/Creatinine ratio 16 12 - 20      GFR est AA >60 >60 ml/min/1.73m2    GFR est non-AA >60 >60 ml/min/1.73m2    Calcium 8.6 8.5 - 10.1 MG/DL    Bilirubin, total 0.3 0.2 - 1.0 MG/DL    ALT (SGPT) 33 16 - 61 U/L    AST (SGOT) 27 10 - 38 U/L    Alk.  phosphatase 163 (H) 45 - 117 U/L    Protein, total 6.9 6.4 - 8.2 g/dL    Albumin 1.8 (L) 3.4 - 5.0 g/dL    Globulin 5.1 (H) 2.0 - 4.0 g/dL    A-G Ratio 0.4 (L) 0.8 - 1.7     VITAMIN D, 25 HYDROXY Collection Time: 09/19/21  5:31 AM   Result Value Ref Range    Vitamin D 25-Hydroxy 24.0 (L) 30 - 100 ng/mL   TSH 3RD GENERATION    Collection Time: 09/19/21  5:31 AM   Result Value Ref Range    TSH 1.64 0.36 - 3.74 uIU/mL   T4, FREE    Collection Time: 09/19/21  5:31 AM   Result Value Ref Range    T4, Free 1.3 0.7 - 1.5 NG/DL   GLUCOSE, POC    Collection Time: 09/19/21  7:07 AM   Result Value Ref Range    Glucose (POC) 47 (LL) 70 - 110 mg/dL   GLUCOSE, POC    Collection Time: 09/19/21  7:27 AM   Result Value Ref Range    Glucose (POC) 67 (L) 70 - 110 mg/dL   GLUCOSE, POC    Collection Time: 09/19/21  8:25 AM   Result Value Ref Range    Glucose (POC) 81 70 - 110 mg/dL   GLUCOSE, POC    Collection Time: 09/19/21 11:29 AM   Result Value Ref Range    Glucose (POC) 133 (H) 70 - 110 mg/dL       Procedures/imaging: see electronic medical records for all procedures/Xrays and details which were not copied into this note but were reviewed prior to creation of Plan        Assessment/Plan     Active Problems:    Diabetic foot infection ([facility] [address] (9/18/2021)       Plan  Diabetic foot infection/uncontrolled diabetes mellitus  Continue Keflex 500 mg every 6 hours for 10 days  Lantus 15 units nightly  Monitor glucose level was Humalog sliding scale  Patient seen by Endo before this recommendation with insulin pump. Check hemoglobin A1c  Nutritional consult low albumin  PT and OT evaluation and treatment  Nonweightbearing right foot  Wound VAC care  Follow-up podiatry    Hypertension  Lisinopril 10 mg daily. Patient on lisinopril 40 mg daily at home  Metoprolol 25 mg twice a day. Patient on metoprolol 50 mg twice a day  Monitor renal function and electrolytes  Hydralazine 25 mg every 8 hour as needed systolic blood pressure above 170    History of hyperthyroidism  Continue methimazole 10 mg daily  Check TSH and free T4        DVT/GI Prophylaxis: Heparin and H2B/PPI    Part of this note was dictated use Dragon medical software. Please excuse errors that have escaped final proofreading. Time for admission more than 65 minutes included review previous record,hospital record ,test result previous discharge juan alberto , ,discussion with patient and exam discussion with wife at the bed side .  Discussion with nursing staff and documentation    Michael Healy MD  1/10/027195:15

## 2021-09-19 NOTE — ROUTINE PROCESS
SHIFT CHANGE NOTE FOR [facility]    Bedside and Verbal shift change report given to Henrietta RN (oncoming nurse) by Yoandy Gibbons RN (offgoing nurse). Report included the following information SBAR, Kardex, MAR and Recent Results.     Situation:   Code Status: Full Code   Hospital Day: 1   Problem List:   Hospital Problems  Date Reviewed: 6/10/2021        Codes Class Noted POA    Diabetic foot infection [facility]) ICD-10-CM: E11.628, L08.9  ICD-9-CM: 250.80, 686.9  9/18/2021 Unknown              Background:   Past Medical History:   Past Medical History:   Diagnosis Date    Diabetes [address] [address]     Diabetes mellitus     Essential hypertension     Hypertension     Urinary retention         Assessment:   Changes in Assessment throughout shift: No change to previous assessment     Patient has a central line: no Reasons if yes: n/a  Insertion date:n/a Last dressing date: n/a   Patient has Bal Cath: no Reasons if yes: n/a   Insertion date: n/a      Last Vitals:     Vitals:    09/18/21 1738 09/18/21 2117   BP: (!) 166/94 (!) 155/87   Pulse: (!) 104 100   Resp: 16 18   Temp: 99.3 °F (37.4 °C) 99.4 °F (37.4 °C)   SpO2: 100% 99%   Weight: 96 kg (211 lb 9.6 oz)    Height: 5' 11\" (1.803 m)         PAIN    Pain Assessment    Pain Intensity 1: 0 (09/19/21 0410) Pain Intensity 1: 2 (12/29/14 1105)    Pain Location 1: Foot Pain Location 1: Abdomen      Pain Intervention(s) 1: Medication (see MAR)  Patient Stated Pain Goal: 1 Patient Stated Pain Goal: 0  o Intervention effective: yes  o Other actions taken for pain:       Skin Assessment  Skin color Skin Color: Appropriate for ethnicity  Condition/Temperature Skin Condition/Temp: Dry, Warm  Integrity Skin Integrity: Wound (add Wound LDA)  Turgor Turgor: Non-tenting  Weekly Pressure Ulcer Documentation  Pressure  Injury Documentation: No Pressure Injury Noted-Pressure Ulcer Prevention Initiated  Wound Prevention & Protection Methods  Orientation of wound Orientation of Wound Prevention: Posterior  Location of Prevention Location of Wound Prevention: Buttocks  Dressing Present Dressing Present : No  Dressing Status    Wound Offloading Wound Offloading (Prevention Methods): Adaptive equipment     INTAKE/OUPUT  Date 09/18/21 0700 - 09/19/21 0659 09/19/21 0700 - 09/20/21 0659   Shift 0700-1859 1900-0659 24 Hour Total 0700-1859 1900-0659 24 Hour Total   INTAKE   P.O.  150 150        P. O.  150 150      Shift Total(mL/kg)  150(1.6) 150(1.6)      OUTPUT   Urine(mL/kg/hr)           Urine Occurrence(s) 0 x 1 x 1 x      Emesis/NG output           Emesis Occurrence(s)  0 x 0 x      Stool           Stool Occurrence(s) 0 x 0 x 0 x      Shift Total(mL/kg)         NET  150 150      Weight (kg) 96 96 96 96 96 96       Recommendations:  Patient needs and requests: toileting  1. Pending tests/procedures: routine labs     2. Functional Level/Equipment: Partial (one person) / Bed (comment)    Fall Precautions:   Fall risk precautions were reinforced with the patient; he was instructed to call for help prior to getting up. The following fall risk precautions were continued: bed/ chair alarms, door signage, yellow bracelet and socks as well as update of the Darby Led tool in the patient's room. Willy Score: 3    HEALS Safety Check    A safety check occurred in the patient's room between off going nurse and oncoming nurse listed above.     The safety check included the below items  Area Items   H  High Alert Medications - Verify all high alert medication drips (heparin, PCA, etc.)   E  Equipment - Suction is set up for ALL patients (with yanker)  - Red plugs utilized for all equipment (IV pumps, etc.)  - WOWs wiped down at end of shift.  - Room stocked with oxygen, suction, and other unit-specific supplies   A  Alarms - Bed alarm is set for fall risk patients  - Ensure chair alarm is in place and activated if patient is up in a chair   L  Lines - Check IV for any infiltration  - Bal bag is empty if patient has a Bal   - Tubing and IV bags are labeled   S  Safety   - Room is clean, patient is clean, and equipment is clean. - Hallways are clear from equipment besides carts. - Fall bracelet on for fall risk patients  - Ensure room is clear and free of clutter  - Suction is set up for ALL patients (with yanker)  - Hallways are clear from equipment besides carts.    - Isolation precautions followed, supplies available outside room, sign posted     Alvin Tavarez RN

## 2021-09-19 NOTE — ROUTINE PROCESS
SHIFT CHANGE NOTE FOR [facility]    Bedside and Verbal shift change report given to Sita Mcgraw (oncoming nurse) by Burt Dennison RN (offgoing nurse). Report included the following information SBAR, Kardex, MAR and Recent Results.     Situation:   Code Status: Full Code   Reason for Admission: Debility, Right leg wound  Hospital Day: 0   Problem List:   Hospital Problems  Date Reviewed: 6/10/2021        Codes Class Noted POA    Diabetic foot infection ([facility] [address] ICD-10-CM: E11.628, L08.9  ICD-9-CM: 250.80, 686.9  9/18/2021 Unknown              Background:   Past Medical History:   Past Medical History:   Diagnosis Date    Diabetes ([facility] [address]     Diabetes mellitus     Essential hypertension     Hypertension     Urinary retention       Patient taking anticoagulants yes      Patient has a defibrillator: no    Assessment:   Changes in Assessment throughout shift: Needs wound vac placement with wound measurements     Patient has central line: no    Patient has Bal Cath: no     Last Vitals:     Vitals:    09/18/21 1738   BP: (!) 166/94   Pulse: (!) 104   Resp: 16   Temp: 99.3 °F (37.4 °C)   SpO2: 100%   Weight: 96 kg (211 lb 9.6 oz)   Height: 5' 11\" (1.803 m)        PAIN    Pain Assessment    Pain Intensity 1: 4 (09/18/21 1739) Pain Intensity 1: 2 (12/29/14 1105)      Pain Location 1:        Pain Intervention(s) 1: Medication (see MAR)  Patient Stated Pain Goal: 0 Patient Stated Pain Goal: 0  o Intervention effective:    o Other actions taken for pain:       Skin Assessment  Skin color    Condition/Temperature    Integrity    Turgor    Weekly Pressure Ulcer Documentation  Pressure  Injury Documentation: No Pressure Injury Noted-Pressure Ulcer Prevention Initiated  Wound Prevention & Protection Methods  Orientation of wound    Location of Prevention    Dressing Present    Dressing Status    Wound Offloading       INTAKE/OUPUT  Date 09/17/21 1900 - 09/18/21 0659(Not Admitted) 09/18/21 0700 - 09/19/21 0659   Shift 2917-2281 24 Hour Total 1657-3459 8998-2621 24 Hour Total   INTAKE   Shift Total(mL/kg)        OUTPUT   Urine          Urine Occurrence(s)   0 x  0 x   Stool          Stool Occurrence(s)   0 x  0 x   Shift Total(mL/kg)        NET        Weight (kg)   96 96 96       Recommendations:  Patient needs and requests: Food & fluids, Assist with toileting & wound care(initial placement of wound vac and wound measurements tonight)  1. Diet: Diabetic    2. Pending tests/procedures: Labs in am.     3. Functional Level/Equipment: W/C. NWB on RLE    4. Estimated Discharge Date: TBD Posted on Whiteboard in Patients Room: [facility] Safety Check    A safety check occurred in the patient's room between off going nurse and oncoming nurse listed above. The safety check included the below items  Area Items   H  High Alert Medications - Verify all high alert medication drips (heparin, PCA, etc.)   E  Equipment - Suction is set up for ALL patients (with roman)  - Red plugs utilized for all equipment (IV pumps, etc.)  - WOWs wiped down at end of shift.  - Room stocked with oxygen, suction, and other unit-specific supplies   A  Alarms - Bed alarm is set for fall risk patients  - Ensure chair alarm is in place and activated if patient is up in a chair   L  Lines - Check IV for any infiltration  - Bal bag is empty if patient has a Bal   - Tubing and IV bags are labeled   S  Safety   - Room is clean, patient is clean, and equipment is clean. - Hallways are clear from equipment besides carts. - Fall bracelet on for fall risk patients  - Ensure room is clear and free of clutter  - Suction is set up for ALL patients (with roman)  - Hallways are clear from equipment besides carts.    - Isolation precautions followed, supplies available outside room, sign posted

## 2021-09-19 NOTE — ROUTINE PROCESS
SHIFT CHANGE NOTE FOR [facility]    Bedside and Verbal shift change report given to Emilee Rahman (oncoming nurse) by Jaymie Munoz RN   (offgoing nurse). Report included the following information SBAR, Kardex, MAR and Recent Results.     Situation:   Code Status: Full Code   Reason for Admission: Debility, Right leg wound  Hospital Day: 1   Problem List:   Hospital Problems  Date Reviewed: 6/10/2021        Codes Class Noted POA    Diabetic foot infection [facility]) ICD-10-CM: E11.628, L08.9  ICD-9-CM: 250.80, 686.9  9/18/2021 Yes              Background:   Past Medical History:   Past Medical History:   Diagnosis Date    Diabetes ([facility] [address]     Diabetes mellitus     Essential hypertension     Hypertension     Urinary retention       Patient taking anticoagulants yes      Patient has a defibrillator: no    Assessment:   Changes in Assessment throughout shift: Needs wound vac placement with wound measurements     Patient has central line: no    Patient has Bal Cath: no     Last Vitals:     Vitals:    09/18/21 1738 09/18/21 2117 09/19/21 0838 09/19/21 1558   BP: (!) 166/94 (!) 155/87 (!) 142/86 (!) 152/82   Pulse: (!) 104 100 94 90   Resp: 16 18 12 15   Temp: 99.3 °F (37.4 °C) 99.4 °F (37.4 °C) 98.6 °F (37 °C) 99.2 °F (37.3 °C)   SpO2: 100% 99% 100% 99%   Weight: 96 kg (211 lb 9.6 oz)      Height: 5' 11\" (1.803 m)           PAIN    Pain Assessment    Pain Intensity 1: 0 (09/19/21 1634) Pain Intensity 1: 2 (12/29/14 1105)    Pain Location 1: Foot Pain Location 1:      Pain Intervention(s) 1: Medication (see MAR) Pain Intervention(s) 1: Medication (see MAR)  Patient Stated Pain Goal: 0 Patient Stated Pain Goal: 0  o Intervention effective:    o Other actions taken for pain:       Skin Assessment  Skin color Skin Color: Appropriate for ethnicity  Condition/Temperature Skin Condition/Temp: Dry, Warm  Integrity Skin Integrity: Wound (add Wound LDA)  Turgor Turgor: Non-tenting  Weekly Pressure Ulcer Documentation  Pressure Injury Documentation: No Pressure Injury Noted-Pressure Ulcer Prevention Initiated  Wound Prevention & Protection Methods  Orientation of wound Orientation of Wound Prevention: Posterior  Location of Prevention Location of Wound Prevention: Sacrum/Coccyx  Dressing Present Dressing Present : No  Dressing Status    Wound Offloading Wound Offloading (Prevention Methods): Blankets     INTAKE/OUPUT  Date 09/18/21 1900 - 09/19/21 0659 09/19/21 0700 - 09/20/21 0659   Shift 1384-4141 24 Hour Total 0700-1859 1900-0659 24 Hour Total   INTAKE   P.O. 150 150 720  720     P. O. 150 150 720  720   Shift Total(mL/kg) 150(1.6) 150(1.6) 720(7.5)  720(7.5)   OUTPUT   Urine(mL/kg/hr)          Urine Occurrence(s) 1 x 1 x 3 x  3 x   Emesis/NG output          Emesis Occurrence(s) 0 x 0 x      Stool          Stool Occurrence(s) 0 x 0 x 0 x  0 x   Shift Total(mL/kg)         150 720  720   Weight (kg) 96 96 96 96 96       Recommendations:  Patient needs and requests: Food & fluids, Assist with toileting & wound care(initial placement of wound vac and wound measurements tonight)  1. Diet: Diabetic    2. Pending tests/procedures: Labs in am.     3. Functional Level/Equipment: W/C. NWB on RLE    4. Estimated Discharge Date: TBD Posted on Whiteboard in Patients Room: no     [facility] Safety Check    A safety check occurred in the patient's room between off going nurse and oncoming nurse listed above.     The safety check included the below items  Area Items   H  High Alert Medications - Verify all high alert medication drips (heparin, PCA, etc.)   E  Equipment - Suction is set up for ALL patients (with yanker)  - Red plugs utilized for all equipment (IV pumps, etc.)  - WOWs wiped down at end of shift.  - Room stocked with oxygen, suction, and other unit-specific supplies   A  Alarms - Bed alarm is set for fall risk patients  - Ensure chair alarm is in place and activated if patient is up in a chair   L  Lines - Check IV for any infiltration  - Bal bag is empty if patient has a Bal   - Tubing and IV bags are labeled   S  Safety   - Room is clean, patient is clean, and equipment is clean. - Hallways are clear from equipment besides carts. - Fall bracelet on for fall risk patients  - Ensure room is clear and free of clutter  - Suction is set up for ALL patients (with darwinker)  - Hallways are clear from equipment besides carts.    - Isolation precautions followed, supplies available outside room, sign posted

## 2021-09-19 NOTE — PROGRESS NOTES
conducted an initial consultation and Spiritual Assessment for Nato Chambers, who is a 60 y. o.,male. Patient's Primary Language is: [place]. According to the patient's EMR Congregation Affiliation is: [place]. The reason the Patient came to the hospital is:   Patient Active Problem List    Diagnosis Date Noted    Diabetic foot infection ([facility] [address] 09/18/2021    Hypoglycemia 02/01/2014        The  provided the following Interventions:  Initiated a relationship of care and support. Explored spiritual needs while hospitalized. Listened empathically and engaged with him concerning the impact of this illness upon him and his making meaning of this time. Offered prayer in accordance with his values and concerns. Chart reviewed. The following outcomes where achieved:  Patient shared limited information about both their medical narrative and spiritual journey/beliefs. Patient processed feelings related to current hospitalization. Patient expressed gratitude for 's visit. Assessment:  Patient does not have any Shinto/cultural needs that will affect patient's preferences in health care. There are no spiritual or Shinto issues which require intervention at this time. Plan:  Chaplains will continue to follow and will provide pastoral care on an as needed/requested basis.  recommends bedside caregivers page  on duty if patient shows signs of acute spiritual or emotional distress.     [address] [place]   [phone]

## 2021-09-19 NOTE — ROUTINE PROCESS
Labs called BG-37. recheck BG-47. Patient is asymptomatic.   Gave 1 cup of orange juice will recheck after 15 minutes

## 2021-09-20 PROBLEM — E11.628 DIABETIC INFECTION OF RIGHT FOOT (HCC): Status: ACTIVE | Noted: 2021-09-02

## 2021-09-20 PROBLEM — Z20.822 COVID-19 RULED OUT BY LABORATORY TESTING: Status: ACTIVE | Noted: 2021-09-08

## 2021-09-20 PROBLEM — Z98.890 STATUS POST INCISION AND DRAINAGE: Status: ACTIVE | Noted: 2021-09-09

## 2021-09-20 PROBLEM — Z74.09 IMPAIRED MOBILITY AND ADLS: Status: ACTIVE | Noted: 2021-09-02

## 2021-09-20 PROBLEM — E55.9 VITAMIN D INSUFFICIENCY: Chronic | Status: ACTIVE | Noted: 2021-09-19

## 2021-09-20 PROBLEM — L08.9 DIABETIC INFECTION OF RIGHT FOOT (HCC): Status: ACTIVE | Noted: 2021-09-02

## 2021-09-20 PROBLEM — Z78.9 IMPAIRED MOBILITY AND ADLS: Status: ACTIVE | Noted: 2021-09-02

## 2021-09-20 PROBLEM — Z98.890 HISTORY OF INCISION AND DRAINAGE: Status: ACTIVE | Noted: 2021-09-04

## 2021-09-20 LAB
GLUCOSE BLD STRIP.AUTO-MCNC: 112 MG/DL (ref 70–110)
GLUCOSE BLD STRIP.AUTO-MCNC: 138 MG/DL (ref 70–110)
GLUCOSE BLD STRIP.AUTO-MCNC: 206 MG/DL (ref 70–110)
GLUCOSE BLD STRIP.AUTO-MCNC: 291 MG/DL (ref 70–110)

## 2021-09-20 PROCEDURE — 74011636637 HC RX REV CODE- 636/637: Performed by: INTERNAL MEDICINE

## 2021-09-20 PROCEDURE — 2709999900 HC NON-CHARGEABLE SUPPLY

## 2021-09-20 PROCEDURE — 97542 WHEELCHAIR MNGMENT TRAINING: CPT

## 2021-09-20 PROCEDURE — 74011250636 HC RX REV CODE- 250/636: Performed by: INTERNAL MEDICINE

## 2021-09-20 PROCEDURE — 97530 THERAPEUTIC ACTIVITIES: CPT

## 2021-09-20 PROCEDURE — 97116 GAIT TRAINING THERAPY: CPT

## 2021-09-20 PROCEDURE — 97162 PT EVAL MOD COMPLEX 30 MIN: CPT

## 2021-09-20 PROCEDURE — 74011250637 HC RX REV CODE- 250/637: Performed by: FAMILY MEDICINE

## 2021-09-20 PROCEDURE — 74011636637 HC RX REV CODE- 636/637: Performed by: FAMILY MEDICINE

## 2021-09-20 PROCEDURE — 97150 GROUP THERAPEUTIC PROCEDURES: CPT

## 2021-09-20 PROCEDURE — 97110 THERAPEUTIC EXERCISES: CPT

## 2021-09-20 PROCEDURE — 97606 NEG PRS WND THER DME>50 SQCM: CPT

## 2021-09-20 PROCEDURE — 65310000000 HC RM PRIVATE REHAB

## 2021-09-20 PROCEDURE — 74011250636 HC RX REV CODE- 250/636: Performed by: FAMILY MEDICINE

## 2021-09-20 PROCEDURE — 82962 GLUCOSE BLOOD TEST: CPT

## 2021-09-20 PROCEDURE — 74011250637 HC RX REV CODE- 250/637: Performed by: INTERNAL MEDICINE

## 2021-09-20 PROCEDURE — 99232 SBSQ HOSP IP/OBS MODERATE 35: CPT | Performed by: INTERNAL MEDICINE

## 2021-09-20 PROCEDURE — 77030025414 HC DRSG VAC ASST SMPLC KCON -B

## 2021-09-20 RX ORDER — ACETAMINOPHEN 325 MG/1
650 TABLET ORAL
Status: DISCONTINUED | OUTPATIENT
Start: 2021-09-20 | End: 2021-10-08 | Stop reason: HOSPADM

## 2021-09-20 RX ORDER — INSULIN GLARGINE 100 [IU]/ML
15 INJECTION, SOLUTION SUBCUTANEOUS
Status: DISCONTINUED | OUTPATIENT
Start: 2021-09-20 | End: 2021-09-25

## 2021-09-20 RX ORDER — INSULIN LISPRO 100 [IU]/ML
INJECTION, SOLUTION INTRAVENOUS; SUBCUTANEOUS
Status: DISCONTINUED | OUTPATIENT
Start: 2021-09-20 | End: 2021-10-06

## 2021-09-20 RX ORDER — HEPARIN SODIUM 5000 [USP'U]/ML
5000 INJECTION, SOLUTION INTRAVENOUS; SUBCUTANEOUS EVERY 8 HOURS
Status: DISCONTINUED | OUTPATIENT
Start: 2021-09-20 | End: 2021-10-08 | Stop reason: HOSPADM

## 2021-09-20 RX ORDER — METOPROLOL TARTRATE 25 MG/1
25 TABLET, FILM COATED ORAL EVERY 12 HOURS
Status: DISCONTINUED | OUTPATIENT
Start: 2021-09-20 | End: 2021-09-25

## 2021-09-20 RX ORDER — OXYCODONE AND ACETAMINOPHEN 5; 325 MG/1; MG/1
1-2 TABLET ORAL
Status: DISCONTINUED | OUTPATIENT
Start: 2021-09-20 | End: 2021-10-08 | Stop reason: HOSPADM

## 2021-09-20 RX ORDER — LISINOPRIL 20 MG/1
20 TABLET ORAL DAILY
Status: DISCONTINUED | OUTPATIENT
Start: 2021-09-21 | End: 2021-10-02

## 2021-09-20 RX ORDER — METHIMAZOLE 10 MG/1
10 TABLET ORAL DAILY
Status: DISCONTINUED | OUTPATIENT
Start: 2021-09-21 | End: 2021-10-08 | Stop reason: HOSPADM

## 2021-09-20 RX ORDER — CHOLECALCIFEROL (VITAMIN D3) 125 MCG
5000 CAPSULE ORAL DAILY
Status: DISCONTINUED | OUTPATIENT
Start: 2021-09-21 | End: 2021-10-08 | Stop reason: HOSPADM

## 2021-09-20 RX ADMIN — FAMOTIDINE 20 MG: 20 TABLET, FILM COATED ORAL at 17:30

## 2021-09-20 RX ADMIN — METOPROLOL TARTRATE 25 MG: 25 TABLET, FILM COATED ORAL at 09:13

## 2021-09-20 RX ADMIN — METHIMAZOLE 10 MG: 10 TABLET ORAL at 09:15

## 2021-09-20 RX ADMIN — HEPARIN SODIUM 5000 UNITS: 5000 INJECTION, SOLUTION INTRAVENOUS; SUBCUTANEOUS at 09:18

## 2021-09-20 RX ADMIN — HEPARIN SODIUM 5000 UNITS: 5000 INJECTION INTRAVENOUS; SUBCUTANEOUS at 17:27

## 2021-09-20 RX ADMIN — CEPHALEXIN 500 MG: 250 CAPSULE ORAL at 05:56

## 2021-09-20 RX ADMIN — HEPARIN SODIUM 5000 UNITS: 5000 INJECTION, SOLUTION INTRAVENOUS; SUBCUTANEOUS at 02:07

## 2021-09-20 RX ADMIN — CEPHALEXIN 500 MG: 250 CAPSULE ORAL at 12:07

## 2021-09-20 RX ADMIN — INSULIN GLARGINE 15 UNITS: 100 INJECTION, SOLUTION SUBCUTANEOUS at 22:19

## 2021-09-20 RX ADMIN — CEPHALEXIN 500 MG: 250 CAPSULE ORAL at 17:29

## 2021-09-20 RX ADMIN — DOCUSATE SODIUM 100 MG: 100 CAPSULE, LIQUID FILLED ORAL at 09:13

## 2021-09-20 RX ADMIN — CEPHALEXIN 500 MG: 250 CAPSULE ORAL at 23:58

## 2021-09-20 RX ADMIN — OXYCODONE HYDROCHLORIDE AND ACETAMINOPHEN 1 TABLET: 5; 325 TABLET ORAL at 12:08

## 2021-09-20 RX ADMIN — FAMOTIDINE 20 MG: 20 TABLET, FILM COATED ORAL at 09:14

## 2021-09-20 RX ADMIN — OXYCODONE HYDROCHLORIDE AND ACETAMINOPHEN 2 TABLET: 5; 325 TABLET ORAL at 05:56

## 2021-09-20 RX ADMIN — DOCUSATE SODIUM 100 MG: 100 CAPSULE, LIQUID FILLED ORAL at 17:30

## 2021-09-20 RX ADMIN — METOPROLOL TARTRATE 25 MG: 25 TABLET, FILM COATED ORAL at 22:29

## 2021-09-20 RX ADMIN — LISINOPRIL 20 MG: 20 TABLET ORAL at 09:14

## 2021-09-20 RX ADMIN — INSULIN LISPRO 6 UNITS: 100 INJECTION, SOLUTION INTRAVENOUS; SUBCUTANEOUS at 12:17

## 2021-09-20 NOTE — REHAB NOTE
[facility] PHYSICAL REHABILITATION  [address] [place] [address], [address]     [address]  OVERALL PLAN OF CARE    Name: Shadia Rodgers CSN: [id number]   Age: 60 y.o. MRN: [id number]   Sex: male Admit Date: 9/18/2021     Primary Rehabilitation Diagnosis  1. Impaired Mobility and ADLs  2. Diabetic infection of right foot  2. S/P Incision and drainage, with decompression of multiple subfascial layers, right foot; Wound debridement, skin, soft tissue, and muscle, right foot (9/9/2021 - Dr. Johanna Mcmanus)  3. S/P Incision and drainage of right foot (9/4/2021 - Dr. Alka Moseley.)    Comorbidities  Patient Active Problem List   Diagnosis Code    Diabetic infection of right foot ([facility] E11.628, L08.9    Essential hypertension I10    Vitamin D insufficiency E55.9    Impaired mobility and ADLs Z74.09, Z78.9    Type 2 diabetes mellitus without complication, with long-term current use of insulin ([facility] E11.9, Z79.4    COVID-19 ruled out by laboratory testing Z20.822    Status post incision and drainage Z98.890    History of incision and drainage Z98.890    Hyperthyroidism E05.90       ANTICIPATED INTERVENTIONS THAT SUPPORT THE MEDICAL NECESSITY OF THIS ADMISSION:    I. Physical Therapy              A. Intensity: 1.5 hour per day              B. Frequency: 5 times per week              C. Duration: 3 weeks              D. Long Term Goals:    1. Patient will move from supine to sit and sit to supine , scoot up and down, and roll side to side in bed with independence. 2.  Patient will transfer from bed to chair and chair to bed with modified independence using the least restrictive device. 3.  Patient will perform sit to stand with modified independence and RW. 4.  Patient will ambulate with supervision for 150 feet with RW, NWB R LE, and hop method.      5.  Patient will ascend/descend 4 stairs with one handrail(s) with minimal assistance/contact guard assist.   E. Interventions: Interventions may include range of motion (AROM, PROM B LE/trunk), motor function (B LE/trunk strengthening/coordination), activity tolerance (vitals, oxygen saturation levels), bed mobility training, balance activities, gait training (progressive ambulation program), wheelchair management and functional transfer training. II. Occupational Therapy              A. Intensity: 1.5 hour per day              B. Frequency: 5 times per week              C. Duration: 3 weeks              D. Long Term Goals:    1. Pt will perform self-feeding with I.    2. Pt will perform grooming with I.    3. Pt will perform UB bathing with Mod I and use of AE PRN. 4. Pt will perform LB bathing with Mod I and use of AE PRN. 5. Pt will perform tub/shower transfer with supv and use of LRAD. 6. Pt will perform UB dressing with Mod I.    7. Pt will perform LB dressing with supv and use of AE PRN. 8. Pt will perform toileting task with Mod I.    9. Pt will perform toilet transfer with supv and use of LRAD. E. Interventions: Interventions may include range of motion (AROM, PROM B UE), motor function (B UE/ strengthening/coordination), activity tolerance (vitals, oxygen saturation levels), balance training, ADL/IADL training and functional transfer training. PHYSICIAN'S ASSESSMENT OF FINDINGS:    Are the established goals sufficient for achieving the optimal level of function? [x]  Yes      []  No    What changes would you recommend to the goals as written? None      Are the interventions noted sufficient for achieving the optimal level of function? [x]  Yes      []  No    What changes would you recommend to the interventions noted? If therapy staff is unable to provide 3 hr of total therapy per day in 5 days due to medical issues or decreased patient tolerance, may modify treatment schedule to 15 hr/week.       Estimated length of stay: 2-3 weeks      Medical rehabilitation prognosis:    []  Excellent     [x]  Good []  Fair     []  Guarded       Discharge Destination:     [x]  Home     []  [address]     []  [place]     []  [place]     []  [place]     []  Other:       Signed:    Mariann Maldonado MD    September 20, 2021

## 2021-09-20 NOTE — PROGRESS NOTES
Problem: Self Care Deficits Care Plan (Adult)  Goal: *Acute Goals and Plan of Care (Insert Text)  Description: Occupational Therapy Goals   Long Term Goals  Initiated 2021 and to be accomplished within 3 week(s), by 10/10/2021. 1. Pt will perform self-feeding with I.  2. Pt will perform grooming with I.  3. Pt will perform UB bathing with Mod I and use of AE PRN. 4. Pt will perform LB bathing with Mod I and use of AE PRN. 5. Pt will perform tub/shower transfer with supv and use of LRAD. 6. Pt will perform UB dressing with Mod I.  7. Pt will perform LB dressing with supv and use of AE PRN. 8. Pt will perform toileting task with Mod I.  9. Pt will perform toilet transfer with supv and use of LRAD. Short Term Goals   Initiated 2021 and to be accomplished within 7 day(s), by 2021. 1. Pt will perform self-feeding with Mod I.   2. Pt will perform grooming with set-up. 3. Pt will perform UB bathing with SBA and use of AE PRN. 4. Pt will perform LB bathing with Min A and use of AE PRN. 5. Pt will perform tub/shower transfer with Min A and use of LRAD. 6. Pt will perform UB dressing with SBA. 7. Pt will perform LB dressing with Mod A and use of AE PRN. 8. Pt will perform toileting task with Mod A.  9. Pt will perform toilet transfer with CGA and use of LRAD. Occupational Therapy TREATMENT    Patient: Man Wing   60 y.o. Patient identified with name and : Yes     Date: 2021    First Tx Session  Time In: 1100  Time Out[de-identified] 36  Group    Second Tx Session  Time In: 1130  Time Out[de-identified] 1200    Diagnosis: Diabetic foot infection ([facility] [address] [E11.628, L08.9]   Precautions: Fall, NWB  Chart, occupational therapy assessment, plan of care, and goals were reviewed.      Pain:  Pt reports 3/10 pain or discomfort prior to treatment at right foot  Pt reports 3/10 pain or discomfort post treatment at right foot  Intervention Provided: N/A      SUBJECTIVE:   Patient stated [facility] long does it take for cognition to decline being in facilities. Heddy  Heddy  I have to focus more. Heddy  Heddy  \"    OBJECTIVE DATA SUMMARY:     THERAPEUTIC ACTIVITY Daily Assessment    FM Mini Peg task to increase FM dexterity, strength, and problems solving for ADLs. Pt required increased time to problem solve and place 1/4 of pegs in 15 minutes. Pt showed decreased attention  and organization. THERAPEUTIC EXERCISE Daily Assessment    Connect 4 task to increase strength for ADLs. Pt reached and paced chips with 2lb weight on wrist.        ASSESSMENT:  Pt working towards increasing BUE strength, FM dexterity, and cognition required for I in ADLs. Pt noted to appear distracted throughout session. Progression toward goals:  [x]          Improving appropriately and progressing toward goals  []          Improving slowly and progressing toward goals  []          Not making progress toward goals and plan of care will be adjusted     PLAN:  Patient continues to benefit from skilled intervention to address the above impairments. Continue treatment per established plan of care. Discharge Recommendations:  Home Health with 24 hr care  Further Equipment Recommendations for Discharge:  transfer bench and bedside commode     Activity Tolerance:  Fair      Estimated LOS:    Please refer to the flowsheet for vital signs taken during this treatment. After treatment:   []  Patient left in no apparent distress sitting up in chair   [x]  Patient left in no apparent distress in bed  []  Call bell left within reach  []  Nursing notified  []  Caregiver present  []  Bed alarm activated    COMMUNICATION/EDUCATION:   [] Home safety education was provided and the patient/caregiver indicated understanding. [x] Patient/family have participated as able in goal setting and plan of care. [] Patient/family agree to work toward stated goals and plan of care. [] Patient understands intent and goals of therapy, but is neutral about his/her participation.   [] Patient is unable to participate in goal setting and plan of care.       Magalie Rodas OT     Outcome: Progressing Towards Goal  Goal: Interventions  Outcome: Progressing Towards Goal

## 2021-09-20 NOTE — CONSULTS
Comprehensive Nutrition Assessment    Type and Reason for Visit: Initial, Consult, Patient education    Nutrition Recommendations/Plan:   - Plan to follow up and provide diabetic diet education prior to discharge  - Continue all other nutrition interventions. Nutrition Assessment:  Pt unavailable at time of visit; on commode. Noted weight gain PTA per chart hx; question if related to fluid status; noted pt has edema. Consult received for diet education; plan to follow up on later date. Has good meal intake since admission per chart documentation. Tolerating diet. Consult for low albumin; current diet adequate in protein content. Malnutrition Assessment:  Malnutrition Status:  No malnutrition      Nutrition History and Allergies: Past medical hx: DM, HTN, urinary retention. Weight gain PTA per chart hx; question if related to fluid retention. Noted pt weighing 175 lb on 2/1/2014 and  197 lb on 6/10/2021 per chart hx. No known food allergies     Estimated Daily Nutrient Needs:  Energy (kcal): 3080-9539; Weight Used for Energy Requirements: Admission (96 kg)  Protein (g): 77-96; Weight Used for Protein Requirements:  (x0.8-1)  Fluid (ml/day): 9216-4605; Method Used for Fluid Requirements: 1 ml/kcal      Nutrition Related Findings:  BM 9/19.    +edema. Wounds:     (diabetic wound)       Current Nutrition Therapies:  ADULT DIET Regular; 3 carb choices (45 gm/meal); Low Fat/Low Chol/High Fiber/PATRICIA    Anthropometric Measures:  · Height:  5' 11\" (180.3 cm)  · Current Body Wt:  96 kg (211 lb 10.3 oz)   · Admission Body Wt:  211 lb 10.3 oz    · Usual Body Wt:   (175 lb on 2/1/2014 and  197 lb on 6/10/2021 per chart hx)     · Ideal Body Wt:  172 lbs:  123 %   · Adjusted Body Weight:   ; Weight Adjustment for: No adjustment   · BMI Category: Overweight (BMI 25.0-29. 9)       Nutrition Diagnosis:   · Food & nutrition-related knowledge deficit related to  (diabetic diet) as evidenced by  (pt requiring information; noted A1c of 10.4%)      Nutrition Interventions:   Food and/or Nutrient Delivery: Continue current diet  Nutrition Education and Counseling: Education needed  Coordination of Nutrition Care: Continue to monitor while inpatient    Goals:  PO nutrition intake will continue to meet >75% of patients estimated nutritional needs over the next 7 days. Patient will increase knowledge of appropriate food choices on a diabetic diet prior to discharge. Nutrition Monitoring and Evaluation:   Behavioral-Environmental Outcomes: None identified  Food/Nutrient Intake Outcomes: Food and nutrient intake  Physical Signs/Symptoms Outcomes: Biochemical data, Meal time behavior, Nutrition focused physical findings    Discharge Planning:     Too soon to determine     Electronically signed by Norman Brandon RD on 9/20/2021 at 5:14 PM    Contact: [phone]

## 2021-09-20 NOTE — PROGRESS NOTES
Problem: Pressure Injury - Risk of  Goal: *Prevention of pressure injury  Description: Document Sarabjit Scale and appropriate interventions in the flowsheet.   Outcome: Progressing Towards Goal  Note: Pressure Injury Interventions:  Sensory Interventions: Chair cushion         Activity Interventions: Chair cushion    Mobility Interventions: Chair cushion    Nutrition Interventions: Document food/fluid/supplement intake                     Problem: Patient Education: Go to Patient Education Activity  Goal: Patient/Family Education  Outcome: Progressing Towards Goal

## 2021-09-20 NOTE — PROGRESS NOTES
[address]  [address] [place] [address], [address]     INPATIENT REHABILITATION  DAILY PROGRESS NOTE     Date: 9/20/2021    Name: Lisa Dear Age / Sex: 60 y.o. / male   CSN: [id number] MRN: [id number]   [facility] Date: 9/18/2021 Length of Stay: 2 days     Primary Rehab Diagnosis: Impaired Mobility and ADLs secondary to:  1. Diabetic infection of right foot  2. S/P Incision and drainage, with decompression of multiple subfascial layers, right foot; Wound debridement, skin, soft tissue, and muscle, right foot (9/9/2021 - Dr. Pilo Somers)  3. S/P Incision and drainage of right foot (9/4/2021 - Dr. Silvina Ross.)      Subjective:     Patient seen and examined. Blood pressure controlled. No documented fever since admission. Blood glucose fairly controlled. Patient's Complaint:   No significant medical complaints    Pain Control: stable, mild-to-moderate joint symptoms intermittently, reasonably well controlled by current meds      Objective:     Vital Signs:  Patient Vitals for the past 24 hrs:   BP Temp Pulse Resp SpO2   09/20/21 0725 (!) 144/82 98.8 °F (37.1 °C) 87 16 98 %   09/19/21 2200 120/70 98.4 °F (36.9 °C) 88 16 99 %        Physical Examination:  GENERAL SURVEY: Patient is awake, alert, oriented x 3, sitting comfortably on the chair, not in acute respiratory distress.   HEENT: pink palpebral conjunctivae, anicteric sclerae, no nasoaural discharge, moist oral mucosa  NECK: supple, no jugular venous distention, no palpable lymph nodes  CHEST/LUNGS: symmetrical chest expansion, good air entry, clear breath sounds  HEART: adynamic precordium, good S1 S2, no S3, regular rhythm, no murmurs  ABDOMEN: flat, bowel sounds appreciated, soft, non-tender  EXTREMITIES: (+) right foot/ankle wrapped in ACE wrap, pink nailbeds, no edema, full and equal pulses, no calf tenderness   NEUROLOGICAL EXAM: The patient is awake, alert and oriented x 3, able to answer questions fairly appropriately, able to follow 1 and 2 step commands. Able to tell time from the wall clock. Cranial nerves II-XII are grossly intact. No gross sensory deficit. Motor strength is 4 to 4+/5 on BUE and LLE, 4- to 4/5 on the RLE.        Current Medications:  Current Facility-Administered Medications   Medication Dose Route Frequency    insulin lispro (HUMALOG) injection   SubCUTAneous TIDAC    heparin (porcine) injection 5,000 Units  5,000 Units SubCUTAneous Q8H    insulin glargine (LANTUS) injection 15 Units  15 Units SubCUTAneous QHS    [START ON 9/21/2021] lisinopriL (PRINIVIL, ZESTRIL) tablet 20 mg  20 mg Oral DAILY    metoprolol tartrate (LOPRESSOR) tablet 25 mg  25 mg Oral Q12H    [START ON 9/21/2021] methIMAzole (TAPAZOLE) tablet 10 mg  10 mg Oral DAILY    famotidine (PEPCID) tablet 20 mg  20 mg Oral BID    docusate sodium (COLACE) capsule 100 mg  100 mg Oral BID    magnesium hydroxide (MILK OF MAGNESIA) 400 mg/5 mL oral suspension 30 mL  30 mL Oral DAILY PRN    bisacodyL (DULCOLAX) tablet 10 mg  10 mg Oral Q48H PRN    bisacodyL (DULCOLAX) suppository 10 mg  10 mg Rectal Q48H PRN    glucose chewable tablet 16 g  4 Tablet Oral PRN    glucagon (GLUCAGEN) injection 1 mg  1 mg IntraMUSCular PRN    dextrose (D50W) injection syrg 12.5-25 g  25-50 mL IntraVENous PRN    cephALEXin (KEFLEX) capsule 500 mg  500 mg Oral Q6H    hydrALAZINE (APRESOLINE) tablet 25 mg  25 mg Oral Q8H PRN    oxyCODONE-acetaminophen (PERCOCET) 5-325 mg per tablet 1-2 Tablet  1-2 Tablet Oral Q6H PRN       Allergies:  No Known Allergies    Lab/Data Review:  Recent Results (from the past 24 hour(s))   GLUCOSE, POC    Collection Time: 09/19/21  5:04 PM   Result Value Ref Range    Glucose (POC) 243 (H) 70 - 110 mg/dL   GLUCOSE, POC    Collection Time: 09/19/21  8:51 PM   Result Value Ref Range    Glucose (POC) 198 (H) 70 - 110 mg/dL   GLUCOSE, POC    Collection Time: 09/20/21  7:46 AM   Result Value Ref Range    Glucose (POC) 138 (H) 70 - 110 mg/dL   GLUCOSE, POC    Collection Time: 09/20/21 12:02 PM   Result Value Ref Range    Glucose (POC) 206 (H) 70 - 110 mg/dL       Assessment:     Primary Rehabilitation Diagnosis  1. Impaired Mobility and ADLs  2. Diabetic infection of right foot  2. S/P Incision and drainage, with decompression of multiple subfascial layers, right foot; Wound debridement, skin, soft tissue, and muscle, right foot (9/9/2021 - Dr. Song Gee)  3. S/P Incision and drainage of right foot (9/4/2021 - Dr. Jerome Rodriguez.)    Comorbidities  Patient Active Problem List   Diagnosis Code    Diabetic infection of right foot ([facility] E11.628, L08.9    Essential hypertension I10    Vitamin D insufficiency E55.9    Impaired mobility and ADLs Z74.09, Z78.9    Type 2 diabetes mellitus without complication, with long-term current use of insulin ([facility] E11.9, Z79.4    COVID-19 ruled out by laboratory testing Z20.822    Status post incision and drainage Z98.890    History of incision and drainage Z98.890       Plan:     1. Justification for continued stay: Good progression towards established rehabilitation goals. 2. Medical Issues being followed closely:    [x]  Fall and safety precautions     [x]  Wound Care     [x]  Bowel and Bladder Function     [x]  Fluid Electrolyte and Nutrition Balance     [x]  Pain Control      3. Issues that 24 hour rehabilitation nursing is following:    [x]  Fall and safety precautions     [x]  Wound Care     [x]  Bowel and Bladder Function     [x]  Fluid Electrolyte and Nutrition Balance     [x]  Pain Control      [x]  Assistance with and education on in-room safety with transfers to and from the bed, wheelchair, toilet and shower. 4. Acute rehabilitation plan of care:    [x]  Continue current care and rehab. [x]  Physical Therapy           [x]  Occupational Therapy           []  Speech Therapy     []  Hold Rehab until further notice     5.  Medications:    [x]  MAR Reviewed [x]  Continue Present Medications     6. DVT Prophylaxis:      []  Enoxaparin     [x]  Unfractionated Heparin     []  Warfarin     []  NOAC     []  WYATT Stockings     []  Sequential Compression Device     []  None     7. Code status    [x]  Full code     []  Partial code     []  Do not intubate     []  Do not resuscitate     8. Orders:   > Diabetic infection of right foot; S/P Incision and drainage, with decompression of multiple subfascial layers, right foot; Wound debridement, skin, soft tissue, and muscle, right foot (9/9/2021 - Dr. Zaida Brower); S/P Incision and drainage of right foot (9/4/2021 - Dr. Cristhian Hitchcock.)   > Nonweightbearing on the right foot   > Wound care nurse consult for WoundVac management   > Wound care instructions: Staff nurse to perform wound vac dressing changes Monday, Wednesday and fridays. Remove old dressing from right plantar foot wound and clean site  with wound spray. Apply skin prep  followed barrier ring then drapes. Cut hole over wound, apply adaptic  to wound bed then pack wound with black foam. Applied drapes  followed by diskus.  Settings 125mmhg continuous. > Continue Cephalexin 500 mg PO q 6 hr (STOP DATE: 9/28/2021)    > Essential hypertension   > Continue:    > Lisinopril 20 mg PO once daily (9AM)    > Metoprolol tartrate 25 mg Po q 12 hr (9AM, 9PM)    > Hydralazine 25 mg PO q 8 hr PRN for SBP greater than 170 mmHg    > Hyperthyroidism   > Continue Methimazole 10 mg PO once daily    > Type 2 diabetes mellitus without complication, with long-term current use of insulin   > HbA1c (9/3/2021) = 10.8   > Continue:    > Insulin glargine 15 units SC q HS    > Insulin lispro sliding scale Sc TID AC only    > Vitamin D insufficiency   > Vitamin D 25-Hydroxy (9/19/2021) = 24.0   > Start Cholecalciferol 5,000 units PO once daily    > COVID-19 ruled out by laboratory testing   > SARS-CoV-2 ([address] [place] [address]) (9/8/2021):  Not detected   > Prior to admission and upon admission to the ARU, patient did not have any fever, desaturation, cough or difficulty breathing     > Analgesia   > Continue:    > Acetaminophen 650 mg PO q 4 hr PRN for pain level 4/10 or lesser    > Percocet 5/325 1-2 tabs PO q 6 hr PRN for pain level 5/10 or greater     > Diet:   > Specifications:     []  Low fat/Low cholesterol/High fiber/PATRICIA     []  Low fat/Low cholesterol/High fiber/2 gm Na     [x]  3 carb choices (45 gm/meal)     []  4 carb choices (60 gm/meal)     []  5 carb choices (75 gm/meal)     []  Other:      []  None      > Solids (consistency):    [x]  Regular     []  Easy to chew     []  Dysphagia - Soft & Bite-sized     []  Dysphagia - Minced & moist     []  Dysphagia - Pureed     []  Full liquid     []  Clear liquid      > Liquids (consistency):    []  Thin     []  Mildly thick (Nectar thick)     []  Moderately thick (Honey thick)     []  Extremely thick (Pudding thick)      > Fluid restriction: None       9. Personal Protective Equipment (ZL94 face mask) was used while interacting with the patient. Patient was using a surgical mask. 10. Patient's progress in rehabilitation and medical issues discussed with the patient. All questions answered to the best of my ability. Care plan discussed with patient and nurse. 11. Total clinical care time is 30 minutes, including review of chart including all labs, radiology, past medical history, and discussion with patient. Greater than 50% of my time was spent in coordination of care and counseling.       Signed:    Lalitha De Anda MD    September 20, 2021

## 2021-09-20 NOTE — PROGRESS NOTES
Problem: Mobility Impaired (Adult and Pediatric)  Goal: *Therapy Goal (Edit Goal, Insert Text)  Description: Physical Therapy Short Term Goals  Initiated 9/20/2021 and to be accomplished within 7 day(s) [9/27/21]  1. Patient will move from supine to sit and sit to supine  and roll side to side in bed with modified independence. 2.  Patient will transfer from bed to chair and chair to bed with Stand-by assistance using the least restrictive device, NWB R LE.  3.  Patient will perform sit to stand with Stand-by assistance to RW and NWB R LE.  4.  Patient will ambulate with contact guard assist for 100 feet with RW, NWB R LE and hop method. 5.  Patient will propel w/c 150 ft with supervision on level surfaces using B UE's and L LE. Physical Therapy Long Term Goals  Initiated 9/20/2021 and to be accomplished within 21 day(s) [10/11/21]  1. Patient will move from supine to sit and sit to supine , scoot up and down, and roll side to side in bed with independence. 2.  Patient will transfer from bed to chair and chair to bed with modified independence using the least restrictive device. 3.  Patient will perform sit to stand with modified independence and RW. 4.  Patient will ambulate with supervision for 150 feet with RW, NWB R LE, and hop method. 5.  Patient will ascend/descend 4 stairs with one handrail(s) with minimal assistance/contact guard assist.     Outcome: Progressing Towards Goal   PHYSICAL THERAPY EVALUATION    Patient: Man Wing (28 y.o. male)  Date: 9/20/2021  Diagnosis: Diabetic foot infection ([facility] [E11.628, L08.9] <principal problem not specified>  Precautions: Fall, NWB  Chart, physical therapy assessment, plan of care and goals were reviewed. Time in:0930  Time out:1100    Patient seen for: Gait training;Patient education;Transfer training; Wheelchair mobility (bed mobility, evaluation)    Pain:  Pt pain was reported as 2/10, R foot, pre-treatment.   Pt pain was reported as 4/10, R foot,  post-treatment. Intervention: repositioning, mobility, elevation    Patient identified with name and : yes    SUBJECTIVE:     Patient stated I'm ready to work with you.  Pt agreeable to participate in PT. Pt requesting step-by-step directions and explanation of what the end goal is so as \"to think about it\". Pt's wife present in room at beginning of session. Patient's Goal for Physical Therapy: \"to be independent again, and be able to do as much for myself again as possible. Get my overall health and strength better. \"    OBJECTIVE DATA SUMMARY:     Past Medical History:   Diagnosis Date    Diabetes ([facility]     Diabetes mellitus     Essential hypertension     Hypertension     Urinary retention      Past Surgical History:   Procedure Laterality Date    HX OTHER SURGICAL      surgery on left eye       Problem List:    Decreased strength B LE  [x]     Decreased strength trunk/core  [x]     Decreased AROM   [x]     Decreased PROM  []    Decreased endurance  [x]     Decreased balance sitting  [x]     Decreased balance standing  [x]     Pain   [x]     Slow ambulation velocity  [x]    Decreased coordination  [x]    Decreased safety awareness  [x]      Functional Limitations:   Decreased independence with bed mobility  [x]     Decreased independence with functional transfers  [x]     Decreased independence with ambulation  [x]     Decreased independence with stair negotiation  [x]       Previous Functional Level: pt reports being independent without AD for all mobility, driving, and worked full-time.      Home Environment: Home Environment: Private residence  # Steps to Enter: 4  Rails to Enter: Yes  Hand Rails : Bilateral (spaced-widely apart)  Wheelchair Ramp: No  One/Two Story Residence: One story  Living Alone: No  Support Systems: Child(roberto), Other Family Member(s)  Patient Expects to be Discharged to[de-identified] House  Current DME Used/Available at Home: None  Tub or Shower Type: Tub/Shower combination    Barriers to Learning/Limitations: None  Compensate with: visual, verbal, tactile, kinesthetic cues/model         Outcome Measures: IRF-RHYS     MMT Initial Assessment   Right Lower Extremity Left Lower Extremity   Hip Flexion 4- 4+   Knee Extension 4- 4   Knee Flexion 4 4+   Ankle Dorsiflexion 4- 4   0/5 No palpable muscle contraction  1/5 Palpable muscle contraction, no joint movement  2-/5 Less than full range of motion in gravity eliminated position  2/5 Able to complete full range of motion in gravity eliminated position  2+/5 Able to initiate movement against gravity  3-/5 More than half but not full range of motion against gravity  3/5 Able to complete full range of motion against gravity  3+/5 Completes full range of motion against gravity with minimal resistance  4-/5 Completes full range of motion against gravity with minimal-moderate resistance  4/5 Completes full range of motion against gravity with moderate resistance  4+/5 Completes full range of motion against gravity with moderate-maximum resistance  5/5 Completes full range of motion against gravity with maximum resistance        GROSS ASSESSMENT Initial Assessment 9/20/2021   AROM Generally decreased, functional (B LE's)   Strength Generally decreased, functional (B LE's)   Coordination Within functional limits   Tone Normal   Sensation Intact (to light touch)   PROM         POSTURE Initial Assessment 9/20/2021   Posture (WDL) Exceptions to [place]   Posture Assessment Forward head         BALANCE Initial Assessment 9/20/2021    Sitting - Static: Good (unsupported)  Sitting - Dynamic: Fair (occasional) (+)  Standing - Static: Fair (RW support)  Standing - Dynamic : Impaired       BED/CHAIR/WHEELCHAIR TRANSFERS Initial Assessment 9/20/2021   Rolling Right 5 (Stand-by assistance)   Rolling Left 5 (Stand-by assistance)   Supine to Sit 5 (Stand-by assistance)   Sit to Stand Minimal assistance   Sit to Supine  (CGA for R LE & mgm't of wound vac tube)   Transfer Assist Score 4 (Minimal assistance) (with both methods; vc's fo hand placement w/ squat pivot)   Transfer Type Other   Comments  CGA during transition of hands to RW during sit to stand; min A for balance and management of RW during transfer or vc's for hand placement and foot positioning during lateral squat pivot. Pt able to maintain NWB R % of time. Car Transfer Minimum assistance (assist for R LE in/out of car & mgm't of door)   Car Type car simulator in rehab       [facility] MOBILITY/MANAGEMENT Initial Assessment 9/20/2021   Able to Propel 75 feet   Assist Level  (min A)   Curbs/ramps assistance required 0 (Not tested)   Wheelchair set up assistance required 2 (Maximal assistance)   Wheelchair management Manages left brake, Manages right brake (with supervision & vc's)   Comments Pt provided with a different w/c for higher height and wider width for better comfort and maneuverability. Gait belt fastened on back of w/c to hang wound vac from during mobility. Pt needed vc's for technique using B UE's and L LE.        WALKING INDEPENDENCE Initial Assessment 9/20/2021   Assistive device RW, gait belt    Ambulation assistance - level surface 4 (Contact guard assistance)   Distance 35 Feet (ft)   Comments  CGA for balance during gait; pt able to maintain NWB R LE consisitently. Pt with shortened step length and slower pace. Ambulation assistance - unlevel surface NT        GAIT Initial Assessment 9/20/2021   Gait Description (WDL) Exceptions to WDL   Gait Abnormalities Decreased step clearance; Step to gait (hop on L LE due to NWB R LE)       STEPS/STAIRS Initial Assessment 9/20/2021   Steps/Stairs ambulated 1 (4\" height step)   Rail Use Both   Assistance Level 3 (Moderate assistance)   Comments  Pt with difficulty clearing L foot over edge of 4\" step and didn't get foot fully on step   Curbs/Ramps  NT       ASSESSMENT :  Based on the objective data described above, the patient presents with decreased strength B LE's, decreased activity tolerance, impaired standing balance, decreased functional mobility and increased risk of falls due to NWB R LE status. Pt had a wound on his R foot, underwent I & D, and now has wound vac and NWB R LE. Pt PLOF was independent, active, no use of AD, driving, and working full-time. Pt is cooperative and motivated with PT. Patient will benefit from skilled intervention to address the above impairments. Patient's rehabilitation potential is considered to be Excellent  Factors which may influence rehabilitation potential include:   []         None noted  []         Mental ability/status  [x]         Medical condition  []         Home/family situation and support systems  []         Safety awareness  [x]         Pain tolerance/management  []         Other:      PLAN :  Please see above, or care plan section of chart, for STG's and LTG's. Thank you. Order received from MD for physical therapy services and chart reviewed. Pt to be seen 5 times per week for 3 hours of total therapy per day for 3 weeks. Thank you for the referral.    Pt would benefit from skilled physical therapy in order to improve independent functional mobility within the home. Interventions may include range of motion (AROM, PROM B LE/trunk), motor function (B LE/trunk strengthening/coordination), activity tolerance (vitals, oxygen saturation levels), bed mobility training, balance activities, gait training (progressive ambulation program), wheelchair management and functional transfer training. Discharge Recommendations: Home Physical Therapy  Further Equipment Recommendations for Discharge: rolling walker and wheelchair        Activity Tolerance:   Good, pt without c/o dizziness, SOB, nausea, or significant increase in pain. Please refer to the flowsheet for vital signs taken during this treatment.     After treatment:   [] Patient left in no apparent distress in bed  [x] Patient left in no apparent distress sitting up in chair  [] Patient left in no apparent distress in w/c mobilizing under own power  [] Patient left in no apparent distress dining area  [] Patient left in no apparent distress mobilizing under own power  [x] Call bell left within reach  [] Nursing notified  [] Caregiver present  [] Bed alarm activated   [x] Chair alarm activated. COMMUNICATION/EDUCATION:   [x]         Fall prevention education was provided and the patient/caregiver indicated understanding. [x]         Patient/family have participated as able in goal setting and plan of care. [x]         Patient/family agree to work toward stated goals and plan of care. []         Patient understands intent and goals of therapy, but is neutral about his/her participation. []         Patient is unable to participate in goal setting and plan of care.     Thank you for this referral.  Destinee Gaspar, PT  9/20/2021

## 2021-09-20 NOTE — WOUND CARE
Physical Exam  Musculoskeletal:        Feet:         Focused assessment   Patient received reclining in bed. A & O x 3, he is pleasant, reports pain minimal at this time. He was medicated via his primary RN prior to this encounter. Right plantar foot incision: 15 x 3 x 2cm. Some slough noted, some muscle present. Moderate serous drainage, kim-wound skin slightly macerated. Old dressing removed from right plantar foot wound and site cleaned with wound spray. Skin prep applied followed barrier ring then drapes. Hole cut over wound, adaptic applied to wound bed then wound packed with one piece black foam, then bridged to top of foot with one piece black foam (total 2 pieces of black foam). Drapes applied followed by diskus. Vac intiated and positive seal obtained. Settings 125/150mmhg continuous. Topical treatment protocol in place as follows: For Hospital: Staff nurse to perform wound vac dressing changes Monday, Wednesday and fridays. Remove old dressing from right plantar foot wound and clean site  with wound spray. Apply skin prep  followed barrier ring then drapes. Cut hole over wound, apply adaptic  to wound bed then pack wound with black foam. Applied drapes  followed by diskus. Settings 125mmhg continuous. For Home Health: Skilled nurse to perform [facility] dressing change to right plantar foot incision 3 times weekly and prn loss of suction or malfunction. Cleanse with wound spray and apply Wound Vac. May titrate up or down by 25mmHg with max 175 and minimum of 125mmhg  to obtain seal, for increased or decreased amount of drainage and when bridging. May apply prn non adherent dressing to protect tendons, ligaments, bone, areas not intended for granulation. Use white foam for all tunnels and undermining or areas where wound base is not easily visualized. When using white foam, increase setting to 150mmhg or greater. Care discussed with primary nurse, Nixon ENGLISH.    Care turned over to nursing staff at this time. Antoinette Owens RN, BSN, [facility]

## 2021-09-20 NOTE — ROUTINE PROCESS
SHIFT CHANGE NOTE FOR [facility]    Bedside and Verbal shift change report given to Emilio Norton RN (oncoming nurse) by Alex Love RN (offgoing nurse). Report included the following information SBAR, Kardex, MAR and Recent Results.     Situation:   Code Status: Full Code   Hospital Day: 2   Problem List:   Hospital Problems  Date Reviewed: 6/10/2021        Codes Class Noted POA    Diabetic foot infection [facility]) ICD-10-CM: E11.628, L08.9  ICD-9-CM: 250.80, 686.9  9/18/2021 Yes              Background:   Past Medical History:   Past Medical History:   Diagnosis Date    Diabetes ([facility] [address]     Diabetes mellitus     Essential hypertension     Hypertension     Urinary retention         Assessment:   Changes in Assessment throughout shift:       Patient has a central line: no Reasons if yes: n/a  Insertion date: Last dressing date: n/s   Patient has Dennis Cath: no Reasons if yes: n/a   Insertion date:919    Shift dennis care completed: YES     Last Vitals:     Vitals:    09/18/21 2117 09/19/21 0838 09/19/21 1558 09/19/21 2200   BP: (!) 155/87 (!) 142/86 (!) 152/82 120/70   Pulse: 100 94 90 88   Resp: 18 12 15 16   Temp: 99.4 °F (37.4 °C) 98.6 °F (37 °C) 99.2 °F (37.3 °C) 98.4 °F (36.9 °C)   SpO2: 99% 100% 99% 99%   Weight:       Height:            PAIN    Pain Assessment    Pain Intensity 1: 0 (09/20/21 0000) Pain Intensity 1: 2 (12/29/14 1105)    Pain Location 1: Foot Pain Location 1: Abdomen    Pain Intervention(s) 1: Medication (see MAR) Pain Intervention(s) 1: Medication (see MAR)  Patient Stated Pain Goal: 0 Patient Stated Pain Goal: 0  o Intervention effective: no  o Other actions taken for pain:  no complaint of pain     Skin Assessment  Skin color Skin Color: Appropriate for ethnicity  Condition/Temperature Skin Condition/Temp: Dry, Warm  Integrity Skin Integrity: Wound (add Wound LDA)  Turgor Turgor: Non-tenting  Weekly Pressure Ulcer Documentation  Pressure  Injury Documentation: No Pressure Injury Noted-Pressure Ulcer Prevention Initiated  Wound Prevention & Protection Methods  Orientation of wound Orientation of Wound Prevention: Posterior  Location of Prevention Location of Wound Prevention: Sacrum/Coccyx  Dressing Present Dressing Present : No  Dressing Status    Wound Offloading Wound Offloading (Prevention Methods): Blankets     INTAKE/OUPUT  Date 09/19/21 0700 - 09/20/21 0659 09/20/21 0700 - 09/21/21 0659   Shift 0700-1859 1900-0659 24 Hour Total 0700-1859 1900-0659 24 Hour Total   INTAKE   P.O. 720 200 920        P. O. 720 200 920      Shift Total(mL/kg) 720(7.5) 200(2.1) 920(9.6)      OUTPUT   Urine(mL/kg/hr)           Urine Occurrence(s) 3 x 2 x 5 x      Emesis/NG output           Emesis Occurrence(s)  0 x 0 x      Stool           Stool Occurrence(s) 0 x 0 x 0 x      Shift Total(mL/kg)          200 920      Weight (kg) 96 96 96 96 96 96       Recommendations:  1. Patient needs and requests: Toileting    2. Pending tests/procedures: routine l;abs    3. Functional Level/Equipment: Partial (one person) / Bed (comment)    Fall Precautions:   Fall risk precautions were reinforced with the patient; he was instructed to call for help prior to getting up. The following fall risk precautions were continued: bed/ chair alarms, door signage, yellow bracelet and socks as well as update of the Loki Perea tool in the patient's room. Willy Score: 3    HEALS Safety Check    A safety check occurred in the patient's room between off going nurse and oncoming nurse listed above.     The safety check included the below items  Area Items   H  High Alert Medications - Verify all high alert medication drips (heparin, PCA, etc.)   E  Equipment - Suction is set up for ALL patients (with yanker)  - Red plugs utilized for all equipment (IV pumps, etc.)  - WOWs wiped down at end of shift.  - Room stocked with oxygen, suction, and other unit-specific supplies   A  Alarms - Bed alarm is set for fall risk patients  - Ensure chair alarm is in place and activated if patient is up in a chair   L  Lines - Check IV for any infiltration  - Bal bag is empty if patient has a Bal   - Tubing and IV bags are labeled   S  Safety   - Room is clean, patient is clean, and equipment is clean. - Hallways are clear from equipment besides carts. - Fall bracelet on for fall risk patients  - Ensure room is clear and free of clutter  - Suction is set up for ALL patients (with yanker)  - Hallways are clear from equipment besides carts.    - Isolation precautions followed, supplies available outside room, sign posted     Em Becker RN

## 2021-09-21 LAB
GLUCOSE BLD STRIP.AUTO-MCNC: 216 MG/DL (ref 70–110)
GLUCOSE BLD STRIP.AUTO-MCNC: 233 MG/DL (ref 70–110)
GLUCOSE BLD STRIP.AUTO-MCNC: 277 MG/DL (ref 70–110)
GLUCOSE BLD STRIP.AUTO-MCNC: 94 MG/DL (ref 70–110)

## 2021-09-21 PROCEDURE — 97110 THERAPEUTIC EXERCISES: CPT

## 2021-09-21 PROCEDURE — 97530 THERAPEUTIC ACTIVITIES: CPT

## 2021-09-21 PROCEDURE — 74011250637 HC RX REV CODE- 250/637: Performed by: INTERNAL MEDICINE

## 2021-09-21 PROCEDURE — 82962 GLUCOSE BLOOD TEST: CPT

## 2021-09-21 PROCEDURE — 74011250636 HC RX REV CODE- 250/636: Performed by: INTERNAL MEDICINE

## 2021-09-21 PROCEDURE — 99232 SBSQ HOSP IP/OBS MODERATE 35: CPT | Performed by: EMERGENCY MEDICINE

## 2021-09-21 PROCEDURE — 97116 GAIT TRAINING THERAPY: CPT

## 2021-09-21 PROCEDURE — 74011250637 HC RX REV CODE- 250/637: Performed by: FAMILY MEDICINE

## 2021-09-21 PROCEDURE — 65310000000 HC RM PRIVATE REHAB

## 2021-09-21 PROCEDURE — 2709999900 HC NON-CHARGEABLE SUPPLY

## 2021-09-21 PROCEDURE — 74011636637 HC RX REV CODE- 636/637: Performed by: INTERNAL MEDICINE

## 2021-09-21 RX ADMIN — CEPHALEXIN 500 MG: 250 CAPSULE ORAL at 17:08

## 2021-09-21 RX ADMIN — DOCUSATE SODIUM 100 MG: 100 CAPSULE, LIQUID FILLED ORAL at 17:08

## 2021-09-21 RX ADMIN — CEPHALEXIN 500 MG: 250 CAPSULE ORAL at 12:35

## 2021-09-21 RX ADMIN — CEPHALEXIN 500 MG: 250 CAPSULE ORAL at 06:04

## 2021-09-21 RX ADMIN — Medication 5000 UNITS: at 08:23

## 2021-09-21 RX ADMIN — METOPROLOL TARTRATE 25 MG: 25 TABLET, FILM COATED ORAL at 21:29

## 2021-09-21 RX ADMIN — METOPROLOL TARTRATE 25 MG: 25 TABLET, FILM COATED ORAL at 08:22

## 2021-09-21 RX ADMIN — FAMOTIDINE 20 MG: 20 TABLET, FILM COATED ORAL at 17:08

## 2021-09-21 RX ADMIN — HEPARIN SODIUM 5000 UNITS: 5000 INJECTION INTRAVENOUS; SUBCUTANEOUS at 01:34

## 2021-09-21 RX ADMIN — LISINOPRIL 20 MG: 20 TABLET ORAL at 08:23

## 2021-09-21 RX ADMIN — DOCUSATE SODIUM 100 MG: 100 CAPSULE, LIQUID FILLED ORAL at 08:23

## 2021-09-21 RX ADMIN — HEPARIN SODIUM 5000 UNITS: 5000 INJECTION INTRAVENOUS; SUBCUTANEOUS at 10:43

## 2021-09-21 RX ADMIN — METHIMAZOLE 10 MG: 10 TABLET ORAL at 08:23

## 2021-09-21 RX ADMIN — INSULIN LISPRO 4 UNITS: 100 INJECTION, SOLUTION INTRAVENOUS; SUBCUTANEOUS at 12:35

## 2021-09-21 RX ADMIN — HEPARIN SODIUM 5000 UNITS: 5000 INJECTION INTRAVENOUS; SUBCUTANEOUS at 17:08

## 2021-09-21 RX ADMIN — FAMOTIDINE 20 MG: 20 TABLET, FILM COATED ORAL at 08:23

## 2021-09-21 RX ADMIN — CEPHALEXIN 500 MG: 250 CAPSULE ORAL at 23:54

## 2021-09-21 RX ADMIN — INSULIN GLARGINE 15 UNITS: 100 INJECTION, SOLUTION SUBCUTANEOUS at 21:28

## 2021-09-21 RX ADMIN — INSULIN LISPRO 6 UNITS: 100 INJECTION, SOLUTION INTRAVENOUS; SUBCUTANEOUS at 08:24

## 2021-09-21 NOTE — ROUTINE PROCESS
SHIFT CHANGE NOTE FOR [facility]    Bedside and Verbal shift change report given to Tami Patel, TAMEKA (oncoming nurse) by Dasha Mcdermott RN (offgoing nurse). Report included the following information SBAR, Kardex, MAR and Recent Results. Situation:   Code Status: Full Code   Hospital Day: 3   Problem List:   Hospital Problems  Date Reviewed: 6/10/2021        Codes Class Noted POA    Essential hypertension (Chronic) ICD-10-CM: I10  ICD-9-CM: 401.9  Unknown Yes        Type 2 diabetes mellitus without complication, with long-term current use of insulin (HCC) (Chronic) ICD-10-CM: E11.9, Z79.4  ICD-9-CM: 250.00, V58.67  Unknown Yes    Overview Signed 9/20/2021 11:13 PM by Bull Sam MD     HbA1c (9/3/2021) = 10.8             Hyperthyroidism (Chronic) ICD-10-CM: E05.90  ICD-9-CM: 242.90  Unknown Yes        Vitamin D insufficiency (Chronic) ICD-10-CM: E55.9  ICD-9-CM: 268.9  9/19/2021 Yes    Overview Signed 9/20/2021 11:11 PM by Bull Sam MD     Vitamin D 25-Hydroxy (9/19/2021) = 24.0             Status post incision and drainage ICD-10-CM: Z98.890  ICD-9-CM: V45.89  9/9/2021 Yes    Overview Signed 9/20/2021 11:18 PM by Bull Sam MD     S/P Incision and drainage, with decompression of multiple subfascial layers, right foot; Wound debridement, skin, soft tissue, and muscle, right foot (9/9/2021 - Dr. Ellen Montes De Oca)             PKIBO-24 ruled out by laboratory testing ICD-10-CM: Z20.822  ICD-9-CM: V01.79  9/8/2021 Yes    Overview Signed 9/20/2021 11:14 PM by Bull Sam MD     SARS-CoV-2 ([address] [place] [address]) (9/8/2021):  Not detected             History of incision and drainage ICD-10-CM: Z98.890  ICD-9-CM: V45.89  9/4/2021 Yes    Overview Signed 9/20/2021 11:18 PM by Bull Sam MD     S/P Incision and drainage of right foot (9/4/2021 - Dr. Escobar Roman)             * (Principal) Diabetic infection of right foot [facility]) ICD-10-CM: E11.628, L08.9  ICD-9-CM: 250.80, 686.9  9/2/2021 Yes Impaired mobility and ADLs ICD-10-CM: Z74.09, Z78.9  ICD-9-CM: V49.89  9/2/2021 Yes              Background:   Past Medical History:   Past Medical History:   Diagnosis Date    COVID-19 ruled out by laboratory testing 9/8/2021    [facility] (9[facility]  Not detected    Diabetic infection of right foot ([address] 9/2/2021    Essential hypertension     Hyperthyroidism     Type 2 diabetes mellitus without complication, with long-term current use of insulin ([facility])     HbA1c (9/3/2021) = 10.8    Vitamin D insufficiency 9/19/2021    Vitamin D 25-Hydroxy (9/19/2021) = 24.0        Assessment:   Changes in Assessment throughout shift:       Patient has a central line: no  Patient has Bal Cath: no        Last Vitals:     Vitals:    09/20/21 1715 09/20/21 2229 09/21/21 0758 09/21/21 1602   BP:  (!) 159/87 (!) 148/88 136/74   Pulse:  100 (!) 102 94   Resp:  18 19 19   Temp:  98.1 °F (36.7 °C) 97.9 °F (36.6 °C) 98.1 °F (36.7 °C)   SpO2:  100% 100% 99%   Weight:       Height: 5' 11\" (1.803 m)           PAIN    Pain Assessment    Pain Intensity 1: 0 (09/21/21 1607) Pain Intensity 1: 2 (12/29/14 1105)    Pain Location 1: Foot Pain Location 1:      Pain Intervention(s) 1: Medication (see MAR) Pain Intervention(s) 1: Medication (see MAR)  Patient Stated Pain Goal: 0 Patient Stated Pain Goal: 0  o Intervention effective: no  o Other actions taken for pain:  no complaint of pain     Skin Assessment  Skin color Skin Color: Appropriate for ethnicity  Condition/Temperature Skin Condition/Temp: Dry, Warm  Integrity Skin Integrity: Wound (add Wound LDA)  Turgor Turgor: Non-tenting  Weekly Pressure Ulcer Documentation  Pressure  Injury Documentation: No Pressure Injury Noted-Pressure Ulcer Prevention Initiated  Wound Prevention & Protection Methods  Orientation of wound Orientation of Wound Prevention: Posterior  Location of Prevention Location of Wound Prevention: Sacrum/Coccyx  Dressing Present Dressing Present : No  Dressing Status    Wound Offloading Wound Offloading (Prevention Methods): Bed, pressure reduction mattress     INTAKE/OUPUT  Date 09/20/21 1900 - 09/21/21 0659 09/21/21 0700 - 09/22/21 0659   Shift 1087-2673 24 Hour Total 3844-0380 1889-6957 24 Hour Total   INTAKE   P.O.  720 480  480     P. O.  720 480  480   Shift Total(mL/kg)  720(7.5) 480(5)  480(5)   OUTPUT   Urine(mL/kg/hr)          Urine Occurrence(s) 3 x 4 x 2 x  2 x   Stool          Stool Occurrence(s) 0 x 0 x 1 x  1 x   Shift Total(mL/kg)        NET  720 480  480   Weight (kg) 96 96 96 96 96       Recommendations:  1. Patient needs and requests: Toileting for urination, encourage fluids    2. Pending tests/procedures: routine l;abs    Functional Level/Equipment: Partial (one person) W/C with assist x 1  Fall Precautions:   Fall risk precautions were reinforced with the patient; he was instructed to call for help prior to getting up. The following fall risk precautions were continued: bed/ chair alarms, door signage, yellow bracelet and socks as well as update of the Kaelhie Duff tool in the patient's room. Willy Score: 3    HEALS Safety Check    A safety check occurred in the patient's room between off going nurse and oncoming nurse listed above. The safety check included the below items  Area Items   H  High Alert Medications - Verify all high alert medication drips (heparin, PCA, etc.)   E  Equipment - Suction is set up for ALL patients (with yanker)  - Red plugs utilized for all equipment (IV pumps, etc.)  - WOWs wiped down at end of shift.  - Room stocked with oxygen, suction, and other unit-specific supplies   A  Alarms - Bed alarm is set for fall risk patients  - Ensure chair alarm is in place and activated if patient is up in a chair   L  Lines - Check IV for any infiltration  - Bal bag is empty if patient has a Bal   - Tubing and IV bags are labeled   S  Safety   - Room is clean, patient is clean, and equipment is clean.   - Hallways are clear from equipment besides carts. - Fall bracelet on for fall risk patients  - Ensure room is clear and free of clutter  - Suction is set up for ALL patients (with roman)  - Hallways are clear from equipment besides carts.    - Isolation precautions followed, supplies available outside room, sign posted     Gypsy Patten RN

## 2021-09-21 NOTE — ROUTINE PROCESS
SHIFT CHANGE NOTE FOR [facility]    Bedside and Verbal shift change report given to TAMEKA Shrestha (oncoming nurse) by Bc Monet RN (offgoing nurse). Report included the following information SBAR, Kardex, MAR and Recent Results. Situation:   Code Status: Full Code   Hospital Day: 3   Problem List:   Hospital Problems  Date Reviewed: 6/10/2021        Codes Class Noted POA    Essential hypertension (Chronic) ICD-10-CM: I10  ICD-9-CM: 401.9  Unknown Yes        Type 2 diabetes mellitus without complication, with long-term current use of insulin (HCC) (Chronic) ICD-10-CM: E11.9, Z79.4  ICD-9-CM: 250.00, V58.67  Unknown Yes    Overview Signed 9/20/2021 11:13 PM by Ad Galo MD     HbA1c (9/3/2021) = 10.8             Hyperthyroidism (Chronic) ICD-10-CM: E05.90  ICD-9-CM: 242.90  Unknown Yes        Vitamin D insufficiency (Chronic) ICD-10-CM: E55.9  ICD-9-CM: 268.9  9/19/2021 Yes    Overview Signed 9/20/2021 11:11 PM by Ad Galo MD     Vitamin D 25-Hydroxy (9/19/2021) = 24.0             Status post incision and drainage ICD-10-CM: Z98.890  ICD-9-CM: V45.89  9/9/2021 Yes    Overview Signed 9/20/2021 11:18 PM by Ad Galo MD     S/P Incision and drainage, with decompression of multiple subfascial layers, right foot; Wound debridement, skin, soft tissue, and muscle, right foot (9/9/2021 - Dr. Chantell Ramey)             EPDBH-45 ruled out by laboratory testing ICD-10-CM: Z20.822  ICD-9-CM: V01.79  9/8/2021 Yes    Overview Signed 9/20/2021 11:14 PM by Ad Galo MD     SARS-CoV-2 ([address] [place] [address]) (9/8/2021):  Not detected             History of incision and drainage ICD-10-CM: Z98.890  ICD-9-CM: V45.89  9/4/2021 Yes    Overview Signed 9/20/2021 11:18 PM by Ad Galo MD     S/P Incision and drainage of right foot (9/4/2021 - Dr. Ivon Aguilar.)             * (Principal) Diabetic infection of right foot [facility]) ICD-10-CM: E11.628, L08.9  ICD-9-CM: 250.80, 686.9  9/2/2021 Yes Impaired mobility and ADLs ICD-10-CM: Z74.09, Z78.9  ICD-9-CM: V49.89  9/2/2021 Yes              Background:   Past Medical History:   Past Medical History:   Diagnosis Date    COVID-19 ruled out by laboratory testing 9/8/2021    [facility] (9[facility]/2021[facility]  Not detected    Diabetic infection of right foot ([address] 9/2/2021    Essential hypertension     Hyperthyroidism     Type 2 diabetes mellitus without complication, with long-term current use of insulin ([facility])     HbA1c (9/3/2021) = 10.8    Vitamin D insufficiency 9/19/2021    Vitamin D 25-Hydroxy (9/19/2021) = 24.0        Assessment:   Changes in Assessment throughout shift: No change to previous assessment     Patient has a central line: no  Patient has Bal Cath: no        Last Vitals:     Vitals:    09/20/21 0725 09/20/21 1607 09/20/21 1715 09/20/21 2229   BP: (!) 144/82 (!) 154/89  (!) 159/87   Pulse: 87 94  100   Resp: 16 16  18   Temp: 98.8 °F (37.1 °C) 98.8 °F (37.1 °C)  98.1 °F (36.7 °C)   SpO2: 98% 100%  100%   Weight:       Height:   5' 11\" (1.803 m)         PAIN    Pain Assessment    Pain Intensity 1: 0 (09/21/21 0400) Pain Intensity 1: 2 (12/29/14 1105)    Pain Location 1: Foot Pain Location 1:      Pain Intervention(s) 1: Medication (see MAR) Pain Intervention(s) 1: Medication (see MAR)  Patient Stated Pain Goal: 0 Patient Stated Pain Goal: 0  o Intervention effective: no  o Other actions taken for pain:  no complaint of pain     Skin Assessment  Skin color Skin Color: Appropriate for ethnicity  Condition/Temperature Skin Condition/Temp: Dry, Warm  Integrity Skin Integrity: Wound (add Wound LDA)  Turgor Turgor: Non-tenting  Weekly Pressure Ulcer Documentation  Pressure  Injury Documentation: No Pressure Injury Noted-Pressure Ulcer Prevention Initiated  Wound Prevention & Protection Methods  Orientation of wound Orientation of Wound Prevention: Posterior  Location of Prevention Location of Wound Prevention: Buttocks, Sacrum/Coccyx  Dressing Present Dressing Present : No  Dressing Status    Wound Offloading Wound Offloading (Prevention Methods): Bed, pressure redistribution/air     INTAKE/OUPUT  Date 09/20/21 0700 - 09/21/21 0659 09/21/21 0700 - 09/22/21 0659   Shift 2076-0466 0131-3847 24 Hour Total 5215-6399 1465-5186 24 Hour Total   INTAKE   P.O. 720  720        P. O. 720  720      Shift Total(mL/kg) 720(7.5)  720(7.5)      OUTPUT   Urine(mL/kg/hr)           Urine Occurrence(s) 1 x 3 x 4 x      Stool           Stool Occurrence(s) 0 x 0 x 0 x      Shift Total(mL/kg)           720      Weight (kg) 96 96 96 96 96 96       Recommendations:  1. Patient needs and requests: Toileting , encourage fluids    2. Pending tests/procedures: routine l;abs    Functional Level/Equipment: Partial (one person) W/C with assist x 1  Fall Precautions:   Fall risk precautions were reinforced with the patient; he was instructed to call for help prior to getting up. The following fall risk precautions were continued: bed/ chair alarms, door signage, yellow bracelet and socks as well as update of the [place] Johnny tool in the patient's room. Willy Score: 3    HEALS Safety Check    A safety check occurred in the patient's room between off going nurse and oncoming nurse listed above.     The safety check included the below items  Area Items   H  High Alert Medications - Verify all high alert medication drips (heparin, PCA, etc.)   E  Equipment - Suction is set up for ALL patients (with yanker)  - Red plugs utilized for all equipment (IV pumps, etc.)  - WOWs wiped down at end of shift.  - Room stocked with oxygen, suction, and other unit-specific supplies   A  Alarms - Bed alarm is set for fall risk patients  - Ensure chair alarm is in place and activated if patient is up in a chair   L  Lines - Check IV for any infiltration  - Bal bag is empty if patient has a Bal   - Tubing and IV bags are labeled   S  Safety   - Room is clean, patient is clean, and equipment is clean. - Hallways are clear from equipment besides carts. - Fall bracelet on for fall risk patients  - Ensure room is clear and free of clutter  - Suction is set up for ALL patients (with roman)  - Hallways are clear from equipment besides carts.    - Isolation precautions followed, supplies available outside room, sign posted     Dyllan Pedraza RN

## 2021-09-21 NOTE — CONSULTS
ARU PSYCHOLOGICAL SCREENING    Assessment Initiated:  September 21, 2021    Rehab Diagnosis: Right LE Cellutlitis    Pertinent Physical/Psychiatric History:     Patient Active Problem List   Diagnosis Code    Diabetic infection of right foot ([facility] E11.628, L08.9    Essential hypertension I10    Vitamin D insufficiency E55.9    Impaired mobility and ADLs Z74.09, Z78.9    Type 2 diabetes mellitus without complication, with long-term current use of insulin ([facility] E11.9, Z79.4    COVID-19 ruled out by laboratory testing Z20.822    Status post incision and drainage Z98.890    History of incision and drainage Z98.890    Hyperthyroidism E05.90       Patient denies history of mental health services and is not requiring psychotropic medication for mood nor behavior stability. Patient does not report tobacco, alcohol nor other substance use.       OBJECTIVE  GENERAL OBSERVATIONS  Willingness to participate in program: [x] good   [] fair [] indifferent [] poor    General Appearance:  Patient observed casually and appropriately dressed and groomed, sitting on EOB and not in distress, with wound vac in place    Sensory Impairments:  Patient has entirely satisfactory auditory reception and comprehension and responds to inquiry with intelligibility; he is observed able to voluntarily move all extremities    [employer] Affiliation:  [facility]    Admission Assessment  Discharge Status   [x] alert  [] lethargic  [] difficult to arouse  [] fluctuating  [] other: Level of Consciousness [x] alert  [] lethargic  [] difficult to arouse  [] fluctuating  [] other:   [x] person  [x] place  [x] time  [x] situation Oriented [x] person  [x] place  [x] time  [x] situation   [x] within normal limits  [] impaired       [] mild        [] moderate        [] severe Attention [x] within normal limits  [] impaired       [] mild        [] moderate        [] severe   [x] within normal limits  [] impaired       [] mild        [] moderate        [] severe Memory [x] within normal limits  [] impaired       [] mild        [] moderate        [] severe   [x] appropriate to situation  [] depressed  [] anxious  [] angry   [] fearful  [] emotionally labile  [x] other: Presents as calm and composed Mood [x] appropriate to situation  [] depressed  [] anxious  [] angry   [] fearful  [] emotionally labile  [] other:   [x] appropriate  [] flat  [] inappropriate to content of speech Affect [x] appropriate  [] flat  [] inappropriate to content of speech   [] appropriate  [] aggressive/agitated  [] withdrawn  [] inappropriate  [x] other: No distress present Behavior [x] appropriate  [] aggressive/agitated  [] withdrawn  [] inappropriate  [] other:   [] good  [x] limited  [] denial  [] none Insight Into Illness [x] good  [] limited  [] denial  [] none   [x] intact  [] impaired       [] mild        [] moderate        [] severe       Describe: Patient appears to be functioning at his baseline of ability Cognition [x] intact  [] impaired       [] mild        [] moderate        [] severe       Describe:    [x] coping  [] demonstrates poor adjustment  [] undetermined       As evidenced by: He is motivated to improve and hopeful for himself in recovery Patient Adjustment to Disability [x] coping  [] demonstrates poor adjustment  [] undetermined       As evidenced by: Patient has been advised about safety and pace on return to home   [x] coping  [] demonstrates poor adjustment  [] undetermined      As evidenced by: Spouse is observed available and supportive, as reportedly are other, immediate family members Family Adjustment to Disability [x] coping  [] demonstrates poor adjustment  [] undetermined      As evidenced by: Excellent support throughout course of hospitalization     ASSESSMENT  Clinical Impression:  Patient is a pleasant and cooperative, 60year old, , employed ([de-identified] seven years for [employer]),  gentleman.   He and spouse reside in [place] in one level residence with four steps to enter. Spouse is also employed but currently able to take \"leave\" during his recovery. They have three sons, including one in [place], one in [place] and another is Sandra Malloy though currently in [facility]. Patient appears to have adequate, basic understand of his medical situation and insists that he is motivated to improve and hopeful for himself in recovery. Fortunately, he is not in a position to have to worry about \"return to work\" issues. He will benefit from further education about all aspects of recovery, especially in order to identify realistic goals for himself and thereby minimize frustration, ie., forced dependency. Safety and pace also need to be reinforced. Emotionally, he is not presenting with any real evidence of distress with illness/recovery. He denies feelings of anxiety nor depression and none are observed. He further denies mental health history and is not requiring psychotropic medication for stability. Naturally, he is having to deal with situational stressors associated to hospitalization and now, prolonged recovery; however, he appears to be managing himself well. He will be further monitored for any possible, emotional and/or behavioral difficulties while on ARU and be encouraged to persevere in his recovery, with safety understood. Cognitively, patient is functioning at his baseline of ability and should have no difficulty in understanding and following treatment instruction. He is entirely alert and oriented and presents with good attention and concentration.       Patient Strengths:  Alert, oriented, pleasant, cooperative, motivated to improve and hopeful for himself in recovery    Patient Preferences:  Patient expects to discharge to home for continued recuperation, and presumably return to work    Rehab Potential:  Good    Educational Needs: Under each heading list the specific items in which the patient or family will need education/training. Example: hip precautions, use of walker, ADL equipment, neglect, judgment, adjustment, etc.     Special considerations or accommodations for teaching:  [x] Yes     [] No     [] NA  If Yes, explain: Issues of forced dependency versus independence with all safety recommendations understood Discharge Status    Completed Demonstrated/ Verbalized Understanding    Yes No Yes No   Info regarding disability:  [] [] [] []   Adjustment:  [] [] [] []   Cognition:  [] [] [] []   Other: [] [] [] []   Other: [] [] [] []   If education not completed, explain: [] [] [] []     PLAN  Problem: Limited insight about all recovery  Long Term Goal: Increase insight  Intervention: Patient education  At Discharge  LTG Achieved: [x] Yes [] No If not achieved, explain:    Problem: Dependency versus independence in recovery  Long Term Goal: Understand all safety recommendations in recovery  Intervention: Patient education and reinforcement  At Discharge  LTG Achieved: [x] Yes [] No If not achieved, explain:    Problem: Situational stress with illness, hospitalization and rehabilitation need  Long Term Goal: Minimize any emergent stress in recovery  Intervention: Support  as needed and behavioral redirection  At Discharge  LTG Achieved: [x] Yes [] No If not achieved, explain:    Problem: Safety and pace in recovery  Long Term Goal: Understand and accept all safety recommendations  Intervention: Patient education and reinforcement  At Discharge  LTG Achieved: [x] Yes [] No If not achieved, explain:    Problem:   Long Term Goal:   Intervention:   At Discharge  LTG Achieved: [] Yes [] No If not achieved, explain:    Matt Burleson, THE [facility]  9/21/2021 8:36 AM    DISCHARGE STATUS    Clinical Impressions: Patient has persevered in his rehabilitation effort and is motivated to continue to improve and regain functional independence after his return to home.   Patient is not presenting with any evidence of distress on discharge, feeling well supported by his family and especially gratified with gains made thus far in his recovery. Patient advised about safety and pace and to identify realistic expectations for himself in order to minimize frustration in his continued recuperation.     Follow-up Services Recommended Purpose                 Cristal Slade PHD  Discharge Date/Time:

## 2021-09-21 NOTE — PROGRESS NOTES
Problem: Self Care Deficits Care Plan (Adult)  Goal: *Acute Goals and Plan of Care (Insert Text)  Description: Occupational Therapy Goals   Long Term Goals  Initiated 2021 and to be accomplished within 3 week(s), by 10/10/2021. 1. Pt will perform self-feeding with I.  2. Pt will perform grooming with I.  3. Pt will perform UB bathing with Mod I and use of AE PRN. 4. Pt will perform LB bathing with Mod I and use of AE PRN. 5. Pt will perform tub/shower transfer with supv and use of LRAD. 6. Pt will perform UB dressing with Mod I.  7. Pt will perform LB dressing with supv and use of AE PRN. 8. Pt will perform toileting task with Mod I.  9. Pt will perform toilet transfer with supv and use of LRAD. Short Term Goals   Initiated 2021 and to be accomplished within 7 day(s), by 2021. 1. Pt will perform self-feeding with Mod I.   2. Pt will perform grooming with set-up. 3. Pt will perform UB bathing with SBA and use of AE PRN. 4. Pt will perform LB bathing with Min A and use of AE PRN. 5. Pt will perform tub/shower transfer with Min A and use of LRAD. 6. Pt will perform UB dressing with SBA. 7. Pt will perform LB dressing with Mod A and use of AE PRN. 8. Pt will perform toileting task with Mod A.  9. Pt will perform toilet transfer with CGA and use of LRAD. Occupational Therapy TREATMENT    Patient: Elizabeth Snyder   60 y.o. Patient identified with name and : Yes     Date: 2021    First Tx Session  Time In: 1330  Time Out[de-identified] 1500    Diagnosis: Diabetic foot infection ([facility] [E11.628, L08.9]   Precautions: Fall, NWB  Chart, occupational therapy assessment, plan of care, and goals were reviewed. Pain:  Pt reports 2/10 pain at right foot prior to treatment. Pt reports 2/10 pain at right foot post treatment. Intervention Provided: N/A      SUBJECTIVE:   Patient stated I have 3 sons and my wife.     OBJECTIVE DATA SUMMARY:     THERAPEUTIC ACTIVITY Daily Assessment \"Perfection\" in standing to increase standing tolerance and balance for ADLs:  Pt able to stand up to 3:37 for first trial and 5:00 minutes for second trial.  Pt required vcs/CGA to weightshift towards center and proper tech to sit back into w/c. THERAPEUTIC EXERCISE Daily Assessment    UB Bike up to 10 minutes with moderate resistance. Pt complete task at a moderate  pace without rest breaks. Chair raises 3x10 to increase strength for ADLs. Instruct pt. in home exercise program: UB HEP (chess press, chest pulls, butterfly wings, front raise,upright rows, overhead press, tricep extension) 2x15 seated in chair      MOBILITY/TRANSFERS Daily Assessment     EOB to stand to RW with CGA and verbal cues for tech. ASSESSMENT:  Pt working towards increasing BUE strength and balance for ADLs. Progression toward goals:  [x]          Improving appropriately and progressing toward goals  []          Improving slowly and progressing toward goals  []          Not making progress toward goals and plan of care will be adjusted     PLAN:  Patient continues to benefit from skilled intervention to address the above impairments. Continue treatment per established plan of care. Discharge Recommendations:  Home Health with 24 hr asst   Further Equipment Recommendations for Discharge:  bedside commode      Activity Tolerance:  Fair      Estimated LOS:    Please refer to the flowsheet for vital signs taken during this treatment. After treatment:   [x]  Patient left in no apparent distress sitting up in chair   []  Patient left in no apparent distress in bed  []  Call bell left within reach  []  Nursing notified  []  Caregiver present  []  Bed alarm activated    COMMUNICATION/EDUCATION:   [] Home safety education was provided and the patient/caregiver indicated understanding. [x] Patient/family have participated as able in goal setting and plan of care.   [] Patient/family agree to work toward stated goals and plan of care.  [] Patient understands intent and goals of therapy, but is neutral about his/her participation. [] Patient is unable to participate in goal setting and plan of care.       Enid Macedo, ZEUS     Outcome: Progressing Towards Goal  Goal: Interventions  Outcome: Progressing Towards Goal

## 2021-09-21 NOTE — PROGRESS NOTES
[x] Psychology  [] Social Work [] Recreational Therapy    INTERVENTION  UNITS/TIME OF SERVICE   Assessment  September 21, 2021   Supportive Counseling    Orientation    Discharge Planning    Resource Linkage              Progress/Current Status    Patient seen for Psychological Evaluation as requested on admission to ARU by Dr. Alysia Del Valle. Patient found sitting upright on EOB, appearing calm and relaxed and immediately willing and able to engage. He seems to feel that he has had a good integration intro treatment milieu and that all staff have been supportive. Patient has generally good understanding of his medical condition and what might be the course of recovery; however, he was cautioned about need to pace himself for safety and in order to minimize frustration, with realistic goals in sight. Patient is not reporting history of mental health services. He is currently denying problems with anxiety nor depression. He is not requiring psychotropic medication for mood nor behavior stability. Patient is observed well supported by his spouse, who spent last night with him, as well as by their three, adult sons. Patient is not reporting immediate concerns about return to work issues and feels that his employer is supportive, as well. Patient will be monitored for any emergent, emotional and/or behavioral difficulties that might arise and be encouraged to persevere in his therapy effort.     Sherice oMnae, THE [facility] 9/21/2021 8:31 AM

## 2021-09-21 NOTE — PROGRESS NOTES
[address]  [address] [place] [address], [address]     INPATIENT REHABILITATION  DAILY PROGRESS NOTE     Date: 9/21/2021    Name: Cherry Llanos Age / Sex: 60 y.o. / male   CSN: [id number] MRN: [id number]   [facility] Date: 9/18/2021 Length of Stay: 3 days     Primary Rehab Diagnosis: Impaired Mobility and ADLs secondary to:  1. Diabetic infection of right foot  2. S/P Incision and drainage, with decompression of multiple subfascial layers, right foot; Wound debridement, skin, soft tissue, and muscle, right foot (9/9/2021 - Dr. Jose Baez)  3. S/P Incision and drainage of right foot (9/4/2021 - Dr. Danie Dominguez)      Subjective:     Patient is sitting in bed in no apparent distress, awake, and follows commands, responds appropriately. Denies any distress.   Son at bedside      Objective:     Vital Signs:  Patient Vitals for the past 24 hrs:   BP Temp Pulse Resp SpO2   09/21/21 1602 136/74 98.1 °F (36.7 °C) 94 19 99 %   09/21/21 0758 (!) 148/88 97.9 °F (36.6 °C) (!) 102 19 100 %   09/20/21 2229 (!) 159/87 98.1 °F (36.7 °C) 100 18 100 %        Physical Examination:  General:  Awake, alert  Cardiovascular:  S1S2+, RRR  Pulmonary:  CTA b/l  GI:  Soft, BS+, NT, ND  Extremities: Right foot in Ace bandage         Current Medications:  Current Facility-Administered Medications   Medication Dose Route Frequency    insulin lispro (HUMALOG) injection   SubCUTAneous TIDAC    heparin (porcine) injection 5,000 Units  5,000 Units SubCUTAneous Q8H    insulin glargine (LANTUS) injection 15 Units  15 Units SubCUTAneous QHS    lisinopriL (PRINIVIL, ZESTRIL) tablet 20 mg  20 mg Oral DAILY    metoprolol tartrate (LOPRESSOR) tablet 25 mg  25 mg Oral Q12H    methIMAzole (TAPAZOLE) tablet 10 mg  10 mg Oral DAILY    oxyCODONE-acetaminophen (PERCOCET) 5-325 mg per tablet 1-2 Tablet  1-2 Tablet Oral Q6H PRN    acetaminophen (TYLENOL) tablet 650 mg  650 mg Oral Q4H PRN    cholecalciferol (VITAMIN D3) capsule 5,000 Units  5,000 Units Oral DAILY    famotidine (PEPCID) tablet 20 mg  20 mg Oral BID    docusate sodium (COLACE) capsule 100 mg  100 mg Oral BID    magnesium hydroxide (MILK OF MAGNESIA) 400 mg/5 mL oral suspension 30 mL  30 mL Oral DAILY PRN    bisacodyL (DULCOLAX) tablet 10 mg  10 mg Oral Q48H PRN    glucose chewable tablet 16 g  4 Tablet Oral PRN    glucagon (GLUCAGEN) injection 1 mg  1 mg IntraMUSCular PRN    dextrose (D50W) injection syrg 12.5-25 g  25-50 mL IntraVENous PRN    cephALEXin (KEFLEX) capsule 500 mg  500 mg Oral Q6H    hydrALAZINE (APRESOLINE) tablet 25 mg  25 mg Oral Q8H PRN       Allergies:  No Known Allergies    Lab/Data Review:  Recent Results (from the past 24 hour(s))   GLUCOSE, POC    Collection Time: 09/20/21 10:07 PM   Result Value Ref Range    Glucose (POC) 291 (H) 70 - 110 mg/dL   GLUCOSE, POC    Collection Time: 09/21/21  7:56 AM   Result Value Ref Range    Glucose (POC) 277 (H) 70 - 110 mg/dL   GLUCOSE, POC    Collection Time: 09/21/21 11:33 AM   Result Value Ref Range    Glucose (POC) 216 (H) 70 - 110 mg/dL   GLUCOSE, POC    Collection Time: 09/21/21  4:47 PM   Result Value Ref Range    Glucose (POC) 94 70 - 110 mg/dL       Assessment:     Primary Rehabilitation Diagnosis  1. Impaired Mobility and ADLs  2. Diabetic infection of right foot  2. S/P Incision and drainage, with decompression of multiple subfascial layers, right foot; Wound debridement, skin, soft tissue, and muscle, right foot (9/9/2021 - Dr. Petra Caro)  3.  S/P Incision and drainage of right foot (9/4/2021 - Dr. Hernesto Angelo.)    Comorbidities  Patient Active Problem List   Diagnosis Code    Diabetic infection of right foot ([facility] E11.628, L08.9    Essential hypertension I10    Vitamin D insufficiency E55.9    Impaired mobility and ADLs Z74.09, Z78.9    Type 2 diabetes mellitus without complication, with long-term current use of insulin ([facility] E11.9, Z79.4    COVID-19 ruled out by laboratory testing Z20.822    Status post incision and drainage Z98.890    History of incision and drainage Z98.890    Hyperthyroidism E05.90       Plan:     1. Justification for continued stay: Good progression towards established rehabilitation goals. 2. Medical Issues being followed closely:    [x]  Fall and safety precautions     [x]  Wound Care     [x]  Bowel and Bladder Function     [x]  Fluid Electrolyte and Nutrition Balance     [x]  Pain Control      3. Issues that 24 hour rehabilitation nursing is following:    [x]  Fall and safety precautions     [x]  Wound Care     [x]  Bowel and Bladder Function     [x]  Fluid Electrolyte and Nutrition Balance     [x]  Pain Control      [x]  Assistance with and education on in-room safety with transfers to and from the bed, wheelchair, toilet and shower. 4. Acute rehabilitation plan of care:    [x]  Continue current care and rehab. [x]  Physical Therapy           [x]  Occupational Therapy           []  Speech Therapy     []  Hold Rehab until further notice     5. Medications:    [x]  MAR Reviewed     [x]  Continue Present Medications     6. DVT Prophylaxis:      []  Enoxaparin     [x]  Unfractionated Heparin     []  Warfarin     []  NOAC     []  WYATT Stockings     []  Sequential Compression Device     []  None     7. Code status    [x]  Full code     []  Partial code     []  Do not intubate     []  Do not resuscitate     8. Orders:   > Diabetic infection of right foot; S/P Incision and drainage, with decompression of multiple subfascial layers, right foot; Wound debridement, skin, soft tissue, and muscle, right foot (9/9/2021 - Dr. Johanna Mcmanus); S/P Incision and drainage of right foot (9/4/2021 - Dr. Alka Moseley.)   > Nonweightbearing on the right foot   > Wound care nurse consult for WoundVac management   > Wound care instructions: Staff nurse to perform wound vac dressing changes Monday, Wednesday and fridays.  Remove old dressing from right plantar foot wound and clean site  with wound spray. Apply skin prep  followed barrier ring then drapes. Cut hole over wound, apply adaptic  to wound bed then pack wound with black foam. Applied drapes  followed by diskus.  Settings 125mmhg continuous. > Continue Keflex through September 28, 2021    > Essential hypertension   > Continue lisinopril and metoprolol, hydralazine as needed    > Hyperthyroidism   > Continue Methimazole 10 mg PO once daily    > Type 2 diabetes mellitus without complication, with long-term current use of insulin   > Lantus, sliding scale insulin    > Vitamin D insufficiency   Supplement    > Analgesia   > Tylenol, Percocet as needed    > Diet:   > Diabetic      9. Personal Protective Equipment (OX89 face mask) was used while interacting with the patient. Patient was using a surgical mask.     D/w Patient      Signed:    Huseyin Donato MD      September 21, 2021

## 2021-09-21 NOTE — ROUTINE PROCESS
SHIFT CHANGE NOTE FOR [facility]    Bedside and Verbal shift change report given to Ren Gomez RN (oncoming nurse) by Suzanna Mccarty RN (offgoing nurse). Report included the following information SBAR, Kardex, MAR and Recent Results.     Situation:   Code Status: Full Code   Hospital Day: 2   Problem List:   Hospital Problems  Date Reviewed: 6/10/2021        Codes Class Noted POA    Diabetic foot infection [facility]) ICD-10-CM: E11.628, L08.9  ICD-9-CM: 250.80, 686.9  9/18/2021 Yes              Background:   Past Medical History:   Past Medical History:   Diagnosis Date    Diabetes ([facility] [address]     Diabetes mellitus     Essential hypertension     Hypertension     Urinary retention         Assessment:   Changes in Assessment throughout shift: No change to previous assessment     Patient has a central line: no  Patient has Bal Cath: no        Last Vitals:     Vitals:    09/19/21 2200 09/20/21 0725 09/20/21 1607 09/20/21 1715   BP: 120/70 (!) 144/82 (!) 154/89    Pulse: 88 87 94    Resp: 16 16 16    Temp: 98.4 °F (36.9 °C) 98.8 °F (37.1 °C) 98.8 °F (37.1 °C)    SpO2: 99% 98% 100%    Weight:       Height:    5' 11\" (1.803 m)        PAIN    Pain Assessment    Pain Intensity 1: 0 (09/20/21 2000) Pain Intensity 1: 2 (12/29/14 1105)    Pain Location 1: Foot Pain Location 1:      Pain Intervention(s) 1: Medication (see MAR) Pain Intervention(s) 1: Medication (see MAR)  Patient Stated Pain Goal: 0 Patient Stated Pain Goal: 0  o Intervention effective: no  o Other actions taken for pain:  no complaint of pain     Skin Assessment  Skin color Skin Color: Appropriate for ethnicity  Condition/Temperature Skin Condition/Temp: Warm, Dry  Integrity Skin Integrity: Wound (add Wound LDA)  Turgor Turgor: Non-tenting  Weekly Pressure Ulcer Documentation  Pressure  Injury Documentation: No Pressure Injury Noted-Pressure Ulcer Prevention Initiated  Wound Prevention & Protection Methods  Orientation of wound Orientation of Wound Prevention: Posterior  Location of Prevention Location of Wound Prevention: Buttocks, Heel, Sacrum/Coccyx  Dressing Present Dressing Present : No  Dressing Status    Wound Offloading Wound Offloading (Prevention Methods): Adaptive equipment, Bed, pressure reduction mattress, Chair cushion, Elevate heels, Repositioning, Turning, Luz Rodger, Wheelchair     INTAKE/OUPUT  Date 09/19/21 1900 - 09/20/21 0659 09/20/21 0700 - 09/21/21 0659   Shift 1088-0355 24 Hour Total 0700-1859 1900-0659 24 Hour Total   INTAKE   P.O. 200 920 720  720     P. O. 200 920 720  720   Shift Total(mL/kg) 200(2.1) 920(9.6) 720(7.5)  720(7.5)   OUTPUT   Urine(mL/kg/hr)          Urine Occurrence(s) 2 x 5 x 1 x 0 x 1 x   Emesis/NG output          Emesis Occurrence(s) 0 x 0 x      Stool          Stool Occurrence(s) 0 x 0 x 0 x 0 x 0 x   Shift Total(mL/kg)         920 720  720   Weight (kg) 96 96 96 96 96       Recommendations:  1. Patient needs and requests: Toileting for urination, encourage fluids    2. Pending tests/procedures: routine l;abs    Functional Level/Equipment:   W/C with assist x 1  Fall Precautions:   Fall risk precautions were reinforced with the patient; he was instructed to call for help prior to getting up. The following fall risk precautions were continued: bed/ chair alarms, door signage, yellow bracelet and socks as well as update of the Thora Bare tool in the patient's room. Willy Score: 3    HEALS Safety Check    A safety check occurred in the patient's room between off going nurse and oncoming nurse listed above.     The safety check included the below items  Area Items   H  High Alert Medications - Verify all high alert medication drips (heparin, PCA, etc.)   E  Equipment - Suction is set up for ALL patients (with yanker)  - Red plugs utilized for all equipment (IV pumps, etc.)  - WOWs wiped down at end of shift.  - Room stocked with oxygen, suction, and other unit-specific supplies   A  Alarms - Bed alarm is set for fall risk patients  - Ensure chair alarm is in place and activated if patient is up in a chair   L  Lines - Check IV for any infiltration  - Bal bag is empty if patient has a Bal   - Tubing and IV bags are labeled   S  Safety   - Room is clean, patient is clean, and equipment is clean. - Hallways are clear from equipment besides carts. - Fall bracelet on for fall risk patients  - Ensure room is clear and free of clutter  - Suction is set up for ALL patients (with yanker)  - Hallways are clear from equipment besides carts.    - Isolation precautions followed, supplies available outside room, sign posted     Marlo Melton RN

## 2021-09-21 NOTE — PROGRESS NOTES
Problem: Mobility Impaired (Adult and Pediatric)  Goal: *Therapy Goal (Edit Goal, Insert Text)  Description: Physical Therapy Short Term Goals  Initiated 9/20/2021 and to be accomplished within 7 day(s) [9/27/21]  1. Patient will move from supine to sit and sit to supine  and roll side to side in bed with modified independence. 2.  Patient will transfer from bed to chair and chair to bed with Stand-by assistance using the least restrictive device, NWB R LE.  3.  Patient will perform sit to stand with Stand-by assistance to RW and NWB R LE.  4.  Patient will ambulate with contact guard assist for 100 feet with RW, NWB R LE and hop method. 5.  Patient will propel w/c 150 ft with supervision on level surfaces using B UE's and L LE. Physical Therapy Long Term Goals  Initiated 9/20/2021 and to be accomplished within 21 day(s) [10/11/21]  1. Patient will move from supine to sit and sit to supine , scoot up and down, and roll side to side in bed with independence. 2.  Patient will transfer from bed to chair and chair to bed with modified independence using the least restrictive device. 3.  Patient will perform sit to stand with modified independence and RW. 4.  Patient will ambulate with supervision for 150 feet with RW, NWB R LE, and hop method. 5.  Patient will ascend/descend 4 stairs with one handrail(s) with minimal assistance/contact guard assist.     Outcome: Progressing Towards Goal    PHYSICAL THERAPY TREATMENT    Patient: Inocente Latham (83 y.o. male)  Date: 9/21/2021  Diagnosis: Diabetic foot infection ([facility] [address] [E11.628, L08.9] Diabetic infection of right foot ([facility] [address]  Precautions: Fall, NWB  Chart, physical therapy assessment, plan of care and goals were reviewed. Time In:0905  Time Out:1040    Patient seen for: Balance activities;Gait training;Transfer training;Patient education; Wheelchair mobility; Therapeutic exercise    Pain:  Pt pain was reported as no c/o pain pre-treatment.   Pt pain was reported as no c/o pain post-treatment. Intervention: Pt notes he does not currently have pain but sometimes has pain in the middle of his right foot. Patient identified with name and : yes    SUBJECTIVE:      Pt is pleasant and cooperative throughout treatment session. He reflects at end of treatment that, \"we have some work to do,\" re: making progress with mobility. OBJECTIVE DATA SUMMARY:    Objective:     BED/MAT MOBILITY Daily Assessment     Sit to Supine : 5 (Supervision) with increased time and pt utilizing UEs to assist right LE onto bed from dependent position. TRANSFERS Daily Assessment     Transfer Type: Other  Other: stand step with RW  Transfer Assistance : 4 (Minimal assistance)  Sit to Stand Assistance: Minimal assistance   Pt requires CGA to minimal assistance for safety and balance with functional transfers requiring verbal cues for slower pace as pt relies on momentum and increased B UE weight bearing. Pt participated in sit <> stand training in front of mirror for visual feedback performing 2 sets of 10 repetitions sit <> stand with intermittent/moderate manual cues for anterior weight shift with sit to stand and increased hip flexion with stand to sit transfers. With stand step transfers, pt requires minimal to moderate verbal cues for remaining closer to w/c when initiating turn and to feel chair or bed with back of his left leg prior to releasing  on RW to reach B armrests or touch the bed. GAIT Daily Assessment    Gait Description (WDL) Exceptions to WDL    Gait Abnormalities Decreased step clearance    Assistive device Walker, rolling;Gait belt    Ambulation assistance - level surface 4 (Minimal assistance)    Distance 50 Feet (ft) x 2 trials    Ambulation assistance- uneven surface  NT    Comments Pt ambulates with decreased left foot clearance and progressive forward flexed posture as pt fatigues.   During second gait trial, pt required 3 standing rest breaks due to fatigue and decreased functional strength. BALANCE Daily Assessment     Sitting - Static: Good (unsupported)  Sitting - Dynamic: Fair (occasional)  Standing - Static: Fair  Standing - Dynamic : Impaired        WHEELCHAIR MOBILITY Daily Assessment     Able to Propel (ft): 56 feet (with supervision)  Wheelchair Setup Assist Required : 3 (Moderate assistance)  Wheelchair Management: Manages left brake;Manages right brake        THERAPEUTIC EXERCISES Daily Assessment     Seated LE Strength Training;  1 Set of 10 Repetitions:  AROM Right and Left LAQ  2 Sets of 10 Repetitions:  2# Right and Left LAQ        ASSESSMENT:  Pt is progressing with functional mobility ambulating household distances now and demonstrating improved quality of movement with sit <> stand within blocked practice. However, pt continues to demonstrate functional weakness and balance impairments with decreased left foot clearance with gait and fatigue requiring 3 standing rest breaks during second gait trial.  Progression toward goals:  [x]      Improving appropriately and progressing toward goals  []      Improving slowly and progressing toward goals  []      Not making progress toward goals and plan of care will be adjusted      PLAN:  Patient continues to benefit from skilled intervention to address the above impairments. Continue treatment per established plan of care. Emphasize functional strengthening to promote increased safety and independence with mobility. Discharge Recommendations:  Home Health Physical Therapy with 24 hour assistance. Further Equipment Recommendations for Discharge:  rolling walker and wheelchair 18 inch with anti-tippers, elevating and removable leg rests, gel cushion      Estimated Discharge Date: 10/08/2021    Activity Tolerance:   Good  Please refer to the flowsheet for vital signs taken during this treatment.     After treatment:   [x] Patient left in no apparent distress in bed  [] Patient left in no apparent distress sitting up in chair  [] Patient left in no apparent distress in w/c mobilizing under own power  [] Patient left in no apparent distress dining area  [] Patient left in no apparent distress mobilizing under own power  [x] Call bell left within reach  [] Nursing notified  [] Caregiver present  [] Bed alarm activated   [] Chair alarm activated      Racquel Dixon, PT, DPT  9/21/2021

## 2021-09-21 NOTE — REHAB NOTE
9580 Pt have an episode of hypoglycemia bg 47. Pt asymptomatic no signs of distress. Gave 4oz of juice. 5654 rechecked bg=67. Gave pt couple of mary crackers and another 4oz of juice. 0825 rechecked bg = 81.  Pt eating breakfast. Will continue to monitor pt

## 2021-09-22 LAB
GLUCOSE BLD STRIP.AUTO-MCNC: 180 MG/DL (ref 70–110)
GLUCOSE BLD STRIP.AUTO-MCNC: 195 MG/DL (ref 70–110)
GLUCOSE BLD STRIP.AUTO-MCNC: 247 MG/DL (ref 70–110)
GLUCOSE BLD STRIP.AUTO-MCNC: 81 MG/DL (ref 70–110)

## 2021-09-22 PROCEDURE — 74011250637 HC RX REV CODE- 250/637: Performed by: INTERNAL MEDICINE

## 2021-09-22 PROCEDURE — 74011250637 HC RX REV CODE- 250/637: Performed by: FAMILY MEDICINE

## 2021-09-22 PROCEDURE — 74011250636 HC RX REV CODE- 250/636: Performed by: INTERNAL MEDICINE

## 2021-09-22 PROCEDURE — 97535 SELF CARE MNGMENT TRAINING: CPT

## 2021-09-22 PROCEDURE — 97110 THERAPEUTIC EXERCISES: CPT

## 2021-09-22 PROCEDURE — 2709999900 HC NON-CHARGEABLE SUPPLY

## 2021-09-22 PROCEDURE — 97116 GAIT TRAINING THERAPY: CPT

## 2021-09-22 PROCEDURE — 97530 THERAPEUTIC ACTIVITIES: CPT

## 2021-09-22 PROCEDURE — 99232 SBSQ HOSP IP/OBS MODERATE 35: CPT | Performed by: EMERGENCY MEDICINE

## 2021-09-22 PROCEDURE — 82962 GLUCOSE BLOOD TEST: CPT

## 2021-09-22 PROCEDURE — 74011636637 HC RX REV CODE- 636/637: Performed by: INTERNAL MEDICINE

## 2021-09-22 PROCEDURE — 65310000000 HC RM PRIVATE REHAB

## 2021-09-22 RX ORDER — POLYETHYLENE GLYCOL 3350 17 G/17G
17 POWDER, FOR SOLUTION ORAL DAILY
Status: DISCONTINUED | OUTPATIENT
Start: 2021-09-23 | End: 2021-10-08 | Stop reason: HOSPADM

## 2021-09-22 RX ADMIN — DOCUSATE SODIUM 100 MG: 100 CAPSULE, LIQUID FILLED ORAL at 17:29

## 2021-09-22 RX ADMIN — METOPROLOL TARTRATE 25 MG: 25 TABLET, FILM COATED ORAL at 08:25

## 2021-09-22 RX ADMIN — INSULIN LISPRO 4 UNITS: 100 INJECTION, SOLUTION INTRAVENOUS; SUBCUTANEOUS at 08:59

## 2021-09-22 RX ADMIN — DOCUSATE SODIUM 100 MG: 100 CAPSULE, LIQUID FILLED ORAL at 08:26

## 2021-09-22 RX ADMIN — HEPARIN SODIUM 5000 UNITS: 5000 INJECTION INTRAVENOUS; SUBCUTANEOUS at 17:29

## 2021-09-22 RX ADMIN — INSULIN GLARGINE 15 UNITS: 100 INJECTION, SOLUTION SUBCUTANEOUS at 21:06

## 2021-09-22 RX ADMIN — LISINOPRIL 20 MG: 20 TABLET ORAL at 08:26

## 2021-09-22 RX ADMIN — FAMOTIDINE 20 MG: 20 TABLET, FILM COATED ORAL at 17:29

## 2021-09-22 RX ADMIN — CEPHALEXIN 500 MG: 250 CAPSULE ORAL at 05:54

## 2021-09-22 RX ADMIN — HEPARIN SODIUM 5000 UNITS: 5000 INJECTION INTRAVENOUS; SUBCUTANEOUS at 10:02

## 2021-09-22 RX ADMIN — CEPHALEXIN 500 MG: 250 CAPSULE ORAL at 17:29

## 2021-09-22 RX ADMIN — OXYCODONE HYDROCHLORIDE AND ACETAMINOPHEN 2 TABLET: 5; 325 TABLET ORAL at 04:04

## 2021-09-22 RX ADMIN — INSULIN LISPRO 2 UNITS: 100 INJECTION, SOLUTION INTRAVENOUS; SUBCUTANEOUS at 17:30

## 2021-09-22 RX ADMIN — CEPHALEXIN 500 MG: 250 CAPSULE ORAL at 23:58

## 2021-09-22 RX ADMIN — FAMOTIDINE 20 MG: 20 TABLET, FILM COATED ORAL at 08:26

## 2021-09-22 RX ADMIN — Medication 5000 UNITS: at 08:26

## 2021-09-22 RX ADMIN — HEPARIN SODIUM 5000 UNITS: 5000 INJECTION INTRAVENOUS; SUBCUTANEOUS at 01:53

## 2021-09-22 RX ADMIN — CEPHALEXIN 500 MG: 250 CAPSULE ORAL at 11:45

## 2021-09-22 RX ADMIN — METHIMAZOLE 10 MG: 10 TABLET ORAL at 08:26

## 2021-09-22 RX ADMIN — METOPROLOL TARTRATE 25 MG: 25 TABLET, FILM COATED ORAL at 21:09

## 2021-09-22 NOTE — PROGRESS NOTES
Problem: Pressure Injury - Risk of  Goal: *Prevention of pressure injury  Description: Document Sarabjit Scale and appropriate interventions in the flowsheet. Outcome: Progressing Towards Goal  Note: Pressure Injury Interventions:  Sensory Interventions: Assess changes in LOC, Pressure redistribution bed/mattress (bed type)         Activity Interventions: Pressure redistribution bed/mattress(bed type)    Mobility Interventions: Pressure redistribution bed/mattress (bed type), HOB 30 degrees or less, Float heels    Nutrition Interventions: Document food/fluid/supplement intake    Friction and Shear Interventions: HOB 30 degrees or less                Problem: Patient Education: Go to Patient Education Activity  Goal: Patient/Family Education  Outcome: Progressing Towards Goal     Problem: Falls - Risk of  Goal: *Absence of Falls  Description: Document Willy Fall Risk and appropriate interventions in the flowsheet.   Outcome: Progressing Towards Goal  Note: Fall Risk Interventions:  Mobility Interventions: Bed/chair exit alarm, Patient to call before getting OOB    Mentation Interventions: Adequate sleep, hydration, pain control, Bed/chair exit alarm    Medication Interventions: Bed/chair exit alarm, Patient to call before getting OOB    Elimination Interventions: Bed/chair exit alarm, Call light in reach, Patient to call for help with toileting needs              Problem: Patient Education: Go to Patient Education Activity  Goal: Patient/Family Education  Outcome: Progressing Towards Goal     Problem: Patient Education: Go to Patient Education Activity  Goal: Patient/Family Education  Outcome: Progressing Towards Goal     Problem: Patient Education: Go to Patient Education Activity  Goal: Patient/Family Education  Outcome: Progressing Towards Goal

## 2021-09-22 NOTE — INTERDISCIPLINARY ROUNDS
[facility] PHYSICAL REHABILITATION  [address] [place] [address], [address]    INPATIENT REHABILITATION  PRE-TEAM CONFERENCE SUMMARY     Date of Conference: 9/23/2021    Patient Information:        Name: Aline Thomas Age / Sex: 60 y.o. / male   CSN: [id number] MRN: [id number]   [facility] Date: 9/18/2021 Length of Stay: 4 days     Primary Rehab Diagnosis: Impaired Mobility and ADLs secondary to:  1. Diabetic infection of right foot  2. S/P Incision and drainage, with decompression of multiple subfascial layers, right foot; Wound debridement, skin, soft tissue, and muscle, right foot (9/9/2021 - Dr. Zaida Brower)  3.  S/P Incision and drainage of right foot (9/4/2021 - Dr. Cristhian Hitchcock.)    Therapy:     FIM SCORES Initial Assessment Weekly Progress Assessment 9/22/2021   Eating Functional Level: 5  Comments: set-up Feeding/Eating Assistance: 7 (Independent)   Swallowing     Grooming 5 Grooming Assistance : 5 (Supervision) (setup at EOB)   Bathing [address], Upper: 5 (Supervision) (setup EOB)  Position Performed: Seated edge of bed  Bathing Assistance, Lower : (P) 4 (Contact guard assistance) (asst to stand for balance with RW during kim wash)  Adaptive Equipment: (P) Walker  Position Performed: (P) Seated in chair;Standing   Upper Body Dressing Functional Level: 4  Items Applied/Steps Completed: Pullover (4 steps)  Comments: SBA/ Min A Dressing Assistance : (P) 5 (Supervision) (setup EOB)   Lower Body Dressing Functional Level: 2  Items Applied/Steps Completed: Elastic waist pants (3 steps), Shoe, left (1 step), Underpants (3 steps)  Comments: Care coordinated with TAMEKA Shrestha for nurse to disconnect wound vac in order to don/doff boxers and elastic waist shorts; Min A to don L sock (1 step) Dressing Assistance : (P) 4 (Minimal assistance) (asst to thread with wound vac)  Position Performed: (P) Seated in chair;Standing  Comments: (P)  (Vcs for sit to stand transfer)   Toileting Functional Level: 2  Comments: BSC at the bedside; CM & hygiene performed in stance with RW while adhering to NWB R LE; Max A for pants down/up  2   Bladder 0 0   Bowel  0 0   Toilet Transfer Wagoner Toilet Transfer Score: 4  Comments: Min A w/ RW (stand pivot LLE); NWB R LE  4   Tub/Shower Transfer Wagoner Tub or Shower Type: Tub/Shower combination  Tub/Shower Transfer Score: 0  Comments: Did not assess due to safety (NWB RLE) with wound vac intact R foot     0   Comprehension Primary Mode of Comprehension: Auditory  Score: 5        Expression Primary Mode of Expression: Verbal  Score: 5        Social Interaction Score: 5     Problem Solving Score: 5     Memory Score: 5       FIM SCORES Initial Assessment Weekly Progress Assessment 9/22/2021   Bed/Chair/Wheelchair Transfers Transfer Type: Other  Other: stand pivot with RW; and lateral squat pivot  Transfer Assistance : 4 (Minimal assistance) (with both methods; vc's fo hand placement w/ squat pivot)  Sit to Stand Assistance: Minimal assistance  Car Transfers: Minimum assistance (assist for R LE in/out of car & mgm't of door)  Car Type: car simulator in rehab Transfer Type:  Other  Other:  (stand step with RW)  Transfer Assistance : 4 (Minimal assistance)  Sit to Stand Assistance: Minimal assistance   Bed Mobility Rolling Right 5 (Stand-by assistance)   Rolling Left 5 (Stand-by assistance)   Supine to Sit 5 (Stand-by assistance)   Sit to Stand Minimal assistance   Sit to Supine  (CGA for R LE & mgm't of wound vac tube)    Rolling Right    NT   Rolling Left    NT   Supine to Sit    NT   Sit to Stand   Minimal assistance   Sit to Supine    NT      Locomotion (W/C) Able to Propel (ft): 75 feet  Functional Level:  (min A)  Curbs/Ramps Assist Required (FIM Score): 0 (Not tested)  Wheelchair Setup Assist Required : 2 (Maximal assistance)  Wheelchair Management: Manages left brake, Manages right brake (with supervision & vc's) Able to propel 150 feet  Function Level  (4) min assistance  Curbs/ramps assistance required (FIM SCORE): 0 (not tested)  Wheelchair Setup Assistance  3 (Moderate assistance)   Wheelchair management: manages left and right brakes     Locomotion (W/C distance)    (90 ft)   Locomotion (Walk) 4 (Contact guard assistance) 4 (Contact guard assistance)  Walker, rolling;Gait belt   Locomotion (Walk dist.) 35 Feet (ft)  (70.5 ft)   Steps/Stairs Steps/Stairs Ambulated (#): 1 (4\" height step)  Level of Assist : 3 (Moderate assistance)  Rail Use: Both  Steps/stairs ambulated (#) 0  Patient is unable to clear right foot for 4 inch step consistently at this time. Nursing:     Neuro:   AAA&O x 4           Respiratory:   [x] WNL   [] O2 LPM:   Other:  Peripheral Vascular:   [] TEDS present   [] Edema present ____ Grade   Cardiac:   [x] WNL   [] Other  Genitourinary:   [x] continent   [] incontinent   [] dennis  Abdominal ___9/22/21____ LBM  GI: __diabetic_____ Diet __thin____ Liquids _____ tube feeds  Musculoskeletal: __4x__ ROM Transfers __w/c___ Assistive Device Used  __1x__ Level of Assistance  Skin Integumentary:   [x] Intact   [] Not Intact   __________Preventative Measures  Details______________________________________________________________  Pain: [x] Controlled   [] Not Controlled   Pain Meds:   [] Scheduled   [x] PRN        Registered Dietitian / Nutrition:   No data found. Supplements:          [x] Yes   [] No      Amount of supplement consumed:       Intake/Output Summary (Last 24 hours) at 9/22/2021 1654  Last data filed at 9/22/2021 1430  Gross per 24 hour   Intake 720 ml   Output    Net 720 ml                              Last bowel movement:         Interdisciplinary Team Goals:     1. Discipline  Physical Therapy    Goal  Patient will transfer bed <>chair with SBA , using LRAD and maintain NWB RLE.     Barrier  Cognitive, decreased strength, mobility, transfers, safety and balance    Intervention  Patient to participate in skilled transfer, mobility, strength, safety and balance training. Goal written by:   ELADIO Willis     2. Discipline  Occupational Therapy    Goal  Pt will perform toilet transfers with S and good safety awareness    Barrier  Decreased strength, balance, cognition    Intervention  Therex, Theact, Education    Goal written by:  ELISEO Pavon/L      3. Discipline  Speech Therapy    Goal      Barrier      Intervention      Goal written by:       4. Discipline  Nursing    Goal  pt will achieve timely wound healing and be free from infections    Barrier  diabetes; environmental exposure; broken skin    Intervention  timely wound vac dressing change; assess s/s of infxn; abx as indicated    Goal written by:  Rosanna Daniel RN     5. Discipline  Clinical Psychology    Goal  Minimize stress in recovery and accept dependency    Barrier  Situational stress and forced dependency    Intervention  Support  and behavioral redirection    Goal written by:  Sherice Monae, PhD     6. Discipline  Nutrition / Dietetics    Goal      Barrier      Intervention      Goal written by:         Disposition / Discharge Planning:      Follow-up services:  [x] Physical Therapy             [x] Occupational Therapy       [] Speech Therapy           [] Skilled Nursing      [] Medical Social Worker   [] Aide        [] Outpatient      [] vs   [x] Home Health  [] vs       [] to progress to outpatient       [] with 24-hour supervision       [] with 24-hour assistance   [] Rivas HOWELL recommendations:  Bedside Commode   Estimated discharge date:  10/8/2021   Discharge Location:  [] Home  [] versus    [] Rivas Aguayo    [] [address] [place] [address]   [] Other:           Electronic Signatures:      Signature Date Signed   Physical Therapist    ELADIO Willis PT, DPT  9/22/2021 9/23/2021   Occupational Therapist    JOSE ANTONIO Pavon   9/22/21   Speech Therapist         Recreational Therapist   [address] Lyssa Joseph 9/22/2021   Nursing    Yared Harrington RN  9/22/2021   Dietitian         Clinical Psychologist    Tyler Cardoso, PhD  9/22/2021    Physician    Regan John MD  9/22/2021       VARINDER Joshi  9/22/2021     Opportunity to share with family/caregiver[] YES [] NO    Relationship to patient____________________________________________________      The above information has been reviewed with the patient in a language that they can understand. Opportunity for comments and questions has been provided and a signed attestation has been scanned into the \"media tab\" of the EMR.       Patient Signature: ______________________________________________________    Date Signed: __________________________________________________________

## 2021-09-22 NOTE — PROGRESS NOTES
Problem: Mobility Impaired (Adult and Pediatric)  Goal: *Therapy Goal (Edit Goal, Insert Text)  Description: Physical Therapy Short Term Goals  Initiated 9/20/2021 and to be accomplished within 7 day(s) [9/27/21]  1. Patient will move from supine to sit and sit to supine  and roll side to side in bed with modified independence. 2.  Patient will transfer from bed to chair and chair to bed with Stand-by assistance using the least restrictive device, NWB R LE.  3.  Patient will perform sit to stand with Stand-by assistance to RW and NWB R LE.  4.  Patient will ambulate with contact guard assist for 100 feet with RW, NWB R LE and hop method. 5.  Patient will propel w/c 150 ft with supervision on level surfaces using B UE's and L LE. Physical Therapy Long Term Goals  Initiated 9/20/2021 and to be accomplished within 21 day(s) [10/11/21]  1. Patient will move from supine to sit and sit to supine , scoot up and down, and roll side to side in bed with independence. 2.  Patient will transfer from bed to chair and chair to bed with modified independence using the least restrictive device. 3.  Patient will perform sit to stand with modified independence and RW. 4.  Patient will ambulate with supervision for 150 feet with RW, NWB R LE, and hop method. 5.  Patient will ascend/descend 4 stairs with one handrail(s) with minimal assistance/contact guard assist.     Outcome: Progressing Towards Goal   PHYSICAL THERAPY TREATMENT    Patient: Cherry Llanos (64 y.o. male)  Date: 9/22/2021  Diagnosis: Diabetic foot infection ([facility] [E11.628, L08.9] Diabetic infection of right foot ([facility]  Precautions: Fall, NWB  Chart, physical therapy assessment, plan of care and goals were reviewed. Time In:1000  Time Out:1100    Patient seen for: AM;Balance activities;Gait training;Patient education; Therapeutic exercise;Transfer training  Time In: 13:30  Time out: 14:30  Patient seen for: PM balance, transfers, ambulation, patient education  Pain:  Pt pain was reported as 3 pre-treatment. Pt pain was reported as 2 post-treatment. Intervention: No intervention  Patient did not c/o pain this afternoon session. Patient identified with name and : Yes    SUBJECTIVE:      AM-I don't feel as if I have come to terms with my illness. I know I will get there, but I'm not there yet. PM-I think I have come to the realization that I need to be here because the things I am being asked to do are not easy and I really have to work through them. It used to be automatic, it is not now. Patient reported that he wanted to talk to the doctor about decreasing his pain medication, he also mentioned asking about a dieretic due to his edema. OBJECTIVE DATA SUMMARY:    Objective:     GROSS ASSESSMENT Daily Assessment     AROM: Generally decreased, functional  Strength: Generally decreased, functional  Coordination: Within functional limits  Tone: Normal  Sensation: Intact      BED/MAT MOBILITY Daily Assessment      PM session-sit to supine with supervision and wound vac is placed and secured end of bed by therapist       TRANSFERS Daily Assessment     Transfer Type: Other  Other:  (stand step with RW)  Transfer Assistance : 4 (Minimal assistance)  Sit to Stand Assistance: Minimal assistance        GAIT Daily Assessment    Gait Description (WDL) Exceptions to WDL    Gait Abnormalities Decreased step clearance    Assistive device Walker, rolling;Gait belt    Ambulation assistance - level surface 4 (Contact guard assistance)    Distance  (70.5 ft) 20 ft x 2    Ambulation assistance- uneven surface Not tested    Comments Patient with left decreased step length during ambulation and with increased bilateral UE support that elevates his shoulders. Patient required cues to relax shoulders during ambulation.   PM session-patient ambulated with RW and wheelchair follow 76 feet with CGA and verbal cues for safe RW placement and with managing RW with backing up to the wheelchair for rest breaks. BALANCE Daily Assessment     Sitting - Static: Good (unsupported)  Sitting - Dynamic: Fair (occasional)  Standing - Static: Fair  Standing - Dynamic : Impaired        WHEELCHAIR MOBILITY Daily Assessment     Able to Propel (ft):  (90 ft)  Functional Level:  (4)  Curbs/Ramps Assist Required (FIM Score): 0 (Not tested)  Wheelchair Setup Assist Required : 3 (Moderate assistance)  Wheelchair Management: Manages left brake;Manages right brake        THERAPEUTIC EXERCISES Daily Assessment       Supine: BLE-SLR, heel slides, abduction x 15 reps each, 1 set. PM-RLE seated; marches, LAQs, toe raises/heel raises x 15 reps each; 1 set. Neuro Re-Education:  PM session-patient practiced hopping in place at the RW to improve right LE clearance for eventual negotiation of  a 4 inch step while maintaining NWBing right LE, 3 sets of 5 reps each. Patient with inconsistent right foot clearance during activity. Continue to strengthen right LE for improved stair training. ASSESSMENT:  Patient is seen for deficits in strength, mobility, transfers, ambulation, safety and balance. Patient is agreeable to session starting 1/2 hr earlier and he is anxious to ambulate. Patient required additional cuing this am with sit to supine, patient was cued both verbal and tactile were required. Patient appeared to freeze when he was asked to transfer from sit to supine. Once he was given cues, he was able to perform activity. PM-patient had increased difficulty this PM session with following commands. He required, verbal cuing, demonstration and written instructions to perform sit to stand transfers safely. Continue to address current deficits for improved functional independence. Patient's nurse, Samantha Yip was made aware of therapist's concerns with patient's difficulty following commands during today's sessions. Samantha Yip reported to this therapist that the patient was alert and oriented x 4.  Discussed concerns with supervision PT and clinical lead. Continue to monitor cognitive deficits. Patient will benefit from continued skilled rehab to address weakness. Progression toward goals:  []      Improving appropriately and progressing toward goals  [x]      Improving slowly and progressing toward goals  []      Not making progress toward goals and plan of care will be adjusted      PLAN:  Patient continues to benefit from skilled intervention to address the above impairments. Continue treatment per established plan of care. Discharge Recommendations:  Home health with 24 hr assistance  Further Equipment Recommendations for Discharge: RW, WC-18 inch with anti-tippers, elevating leg rests and gel cushion. Estimated Discharge Date: 10/8/2021    Activity Tolerance: Tolerates session well, fatigues easily and requires education regarding limitations. Please refer to the flowsheet for vital signs taken during this treatment.     After treatment:   [] Patient left in no apparent distress in bed  [] Patient left in no apparent distress sitting up in chair  [x] Patient left in no apparent distress in w/c mobilizing under own power  [] Patient left in no apparent distress dining area  [] Patient left in no apparent distress mobilizing under own power  [] Call bell left within reach  [x] Nursing notified  [x] Caregiver present  [] Bed alarm activated   [] Chair alarm activated      Sal Yip  9/22/2021

## 2021-09-22 NOTE — PROGRESS NOTES
Patient is a 60year old male admitted to ARU with cellulitis of the right LE. The patient is alert and oriented and his son Jessica Moore [phone] is in the room with him and the patient agrees to this interview in front of his son. He states he lives with his wife Alfredo Vu [phone] in a one story home with 4 steps to enter. He also states he has a tub shower. He works full time in executive staffing. He states he drove, was independent with self care PTA and dose not use any DME. He states he has no history with home health, out patient therapy or SNF. He also states that he does not have a POA and his wife is his NOK. His wife and sons plan to assist him upon discharge. Patient confirms his insurance is [employer]. I reviewed the DC process, team conference and caregiver eduction. Pt states understanding. Patient states he uses [facility] on [address] and would like to use them upon DC instead of the Meds to [facility].  He also stated that he has received both of his COVID vaccinations about 8 months ago. He cannot remember the exact date.     Caregiver education was set up with the patient's wife Alfredo Vu on 9-24 at 9:30am and 10-5-2021 at 9:30am.    SW to follow

## 2021-09-22 NOTE — PROGRESS NOTES
Problem: Self Care Deficits Care Plan (Adult)  Goal: *Acute Goals and Plan of Care (Insert Text)  Description: Occupational Therapy Goals   Long Term Goals  Initiated 2021 and to be accomplished within 3 week(s), by 10/10/2021. 1. Pt will perform self-feeding with I.  2. Pt will perform grooming with I.  3. Pt will perform UB bathing with Mod I and use of AE PRN. 4. Pt will perform LB bathing with Mod I and use of AE PRN. 5. Pt will perform tub/shower transfer with supv and use of LRAD. 6. Pt will perform UB dressing with Mod I.  7. Pt will perform LB dressing with supv and use of AE PRN. 8. Pt will perform toileting task with Mod I.  9. Pt will perform toilet transfer with supv and use of LRAD. Short Term Goals   Initiated 2021 and to be accomplished within 7 day(s), by 2021. 1. Pt will perform self-feeding with Mod I.   2. Pt will perform grooming with set-up. 3. Pt will perform UB bathing with SBA and use of AE PRN. 4. Pt will perform LB bathing with Min A and use of AE PRN. 5. Pt will perform tub/shower transfer with Min A and use of LRAD. 6. Pt will perform UB dressing with SBA. 7. Pt will perform LB dressing with Mod A and use of AE PRN. 8. Pt will perform toileting task with Mod A.  9. Pt will perform toilet transfer with CGA and use of LRAD. Occupational Therapy TREATMENT    Patient: Lashonda Duarte   60 y.o. Patient identified with name and : Yes     Date: 2021    First Tx Session  Time In: 0730  Time Out[de-identified] 0900    Diagnosis: Diabetic foot infection ([facility] [address] [E11.628, L08.9]   Precautions: Fall, NWB  Chart, occupational therapy assessment, plan of care, and goals were reviewed. Pain:  Pt reports 0/10 pain or discomfort prior to treatment. Pt reports 0/10 pain or discomfort post treatment. Intervention Provided: N/A      SUBJECTIVE:   Patient stated Let's do that again.  regarding sit to stand transfers.     OBJECTIVE DATA SUMMARY:       THERAPEUTIC EXERCISE Daily Assessment    UB Bike up to 10 minutes at moderate resistance and pace to and chair raises 3x10 to increase strength for ADLs. FEEDING/EATING Daily Assessment    Feeding/Eating  Feeding/Eating Assistance: 7 (Independent)     GROOMING Daily Assessment    Grooming  Grooming Assistance : 5 (Supervision) (setup at EOB)     UPPER BODY BATHING Daily Assessment    Upper Body Bathing  Bathing Assistance, Upper: 5 (Supervision) (setup EOB)  Position Performed: Seated edge of bed     LOWER BODY BATHING Daily Assessment    Lower Body Bathing  Bathing Assistance, Lower : (P) 4 (Contact guard assistance) (asst to stand for balance with RW during kim wash)  Adaptive Equipment: (P) Walker  Position Performed: (P) Seated in chair;Standing     UPPER BODY DRESSING Daily Assessment    Upper Body Dressing   Dressing Assistance : (P) 5 (Supervision) (setup EOB)     LOWER BODY DRESSING Daily Assessment    Lower Body Dressing   Dressing Assistance : (P) 4 (Minimal assistance) (asst to thread with wound vac)  Position Performed: (P) Seated in chair;Standing  Comments: (P)  (Vcs for sit to stand transfer)  Clinical judgement 2/2 to Nursing helping patient to thread briefs/pants due to time constraints. Pt noted to demonstrate good carryover of using reacher after instruction. MOBILITY/TRANSFERS Daily Assessment     Sit to stand transfers with CGA using RW. Pt shows decreased tech and safety requiring verbal reminders. EOB to w/c with CGA       ASSESSMENT:  Pt working towards increasing BUE strength, standing balance, and education on AE for I in ADLs. Pt to continue with POC. Progression toward goals:  [x]          Improving appropriately and progressing toward goals  []          Improving slowly and progressing toward goals  []          Not making progress toward goals and plan of care will be adjusted     PLAN:  Patient continues to benefit from skilled intervention to address the above impairments. Continue treatment per established plan of care. Discharge Recommendations:  Home Health with asst  Further Equipment Recommendations for Discharge:  Discussed with pt and in agreement with bedside commode. Tub shower transfer bench deferred to [facility]. Activity Tolerance:  Fair      Estimated LOS:10/8/21    Please refer to the flowsheet for vital signs taken during this treatment. After treatment:   [x]  Patient left in no apparent distress sitting up in chair   []  Patient left in no apparent distress in bed  []  Call bell left within reach  []  Nursing notified  []  Caregiver present  []  Bed alarm activated    COMMUNICATION/EDUCATION:   [] Home safety education was provided and the patient/caregiver indicated understanding. [x] Patient/family have participated as able in goal setting and plan of care. [] Patient/family agree to work toward stated goals and plan of care. [] Patient understands intent and goals of therapy, but is neutral about his/her participation. [] Patient is unable to participate in goal setting and plan of care.       George Mendoza OT   Outcome: Progressing Towards Goal  Goal: Interventions  Outcome: Progressing Towards Goal

## 2021-09-22 NOTE — PROGRESS NOTES
[address]  [address] [place] [address], [address]     INPATIENT REHABILITATION  DAILY PROGRESS NOTE     Date: 9/22/2021    Name: Juli Jon Age / Sex: 60 y.o. / male   CSN: [id number] MRN: [id number]   [facility] Date: 9/18/2021 Length of Stay: 4 days     Primary Rehab Diagnosis: Impaired Mobility and ADLs secondary to:  1. Diabetic infection of right foot  2. S/P Incision and drainage, with decompression of multiple subfascial layers, right foot; Wound debridement, skin, soft tissue, and muscle, right foot (9/9/2021 - Dr. Chantell Ramey)  3. S/P Incision and drainage of right foot (9/4/2021 - Dr. Ivon Aguilar.)      Subjective:     Patient is sitting in bed in no apparent distress, awake, follows commands    Objective:     Vital Signs:  Patient Vitals for the past 24 hrs:   BP Temp Pulse Resp SpO2   09/22/21 1546 (!) 146/83 98.5 °F (36.9 °C) 90 18 99 %   09/22/21 0736 (!) 155/88 98.7 °F (37.1 °C) 99 16 100 %   09/21/21 2126 (!) 162/85 98.2 °F (36.8 °C) 93 18 100 %        Physical Examination:  General:  Awake, follows commands and responds appropriately  Cardiovascular:  S1S2+, RRR  Pulmonary:  CTA b/l  GI:  Soft, BS+, NT, ND  Extremities: Right foot with wound VAC.   Patient states that dressing was changed last night           Current Medications:  Current Facility-Administered Medications   Medication Dose Route Frequency    insulin lispro (HUMALOG) injection   SubCUTAneous TIDAC    heparin (porcine) injection 5,000 Units  5,000 Units SubCUTAneous Q8H    insulin glargine (LANTUS) injection 15 Units  15 Units SubCUTAneous QHS    lisinopriL (PRINIVIL, ZESTRIL) tablet 20 mg  20 mg Oral DAILY    metoprolol tartrate (LOPRESSOR) tablet 25 mg  25 mg Oral Q12H    methIMAzole (TAPAZOLE) tablet 10 mg  10 mg Oral DAILY    oxyCODONE-acetaminophen (PERCOCET) 5-325 mg per tablet 1-2 Tablet  1-2 Tablet Oral Q6H PRN    acetaminophen (TYLENOL) tablet 650 mg  650 mg Oral Q4H PRN  cholecalciferol (VITAMIN D3) capsule 5,000 Units  5,000 Units Oral DAILY    famotidine (PEPCID) tablet 20 mg  20 mg Oral BID    docusate sodium (COLACE) capsule 100 mg  100 mg Oral BID    magnesium hydroxide (MILK OF MAGNESIA) 400 mg/5 mL oral suspension 30 mL  30 mL Oral DAILY PRN    bisacodyL (DULCOLAX) tablet 10 mg  10 mg Oral Q48H PRN    glucose chewable tablet 16 g  4 Tablet Oral PRN    glucagon (GLUCAGEN) injection 1 mg  1 mg IntraMUSCular PRN    dextrose (D50W) injection syrg 12.5-25 g  25-50 mL IntraVENous PRN    cephALEXin (KEFLEX) capsule 500 mg  500 mg Oral Q6H    hydrALAZINE (APRESOLINE) tablet 25 mg  25 mg Oral Q8H PRN       Allergies:  No Known Allergies    Lab/Data Review:  Recent Results (from the past 24 hour(s))   GLUCOSE, POC    Collection Time: 09/21/21  9:22 PM   Result Value Ref Range    Glucose (POC) 233 (H) 70 - 110 mg/dL   GLUCOSE, POC    Collection Time: 09/22/21  8:28 AM   Result Value Ref Range    Glucose (POC) 247 (H) 70 - 110 mg/dL   GLUCOSE, POC    Collection Time: 09/22/21 11:50 AM   Result Value Ref Range    Glucose (POC) 81 70 - 110 mg/dL   GLUCOSE, POC    Collection Time: 09/22/21  4:50 PM   Result Value Ref Range    Glucose (POC) 195 (H) 70 - 110 mg/dL       Assessment:     Primary Rehabilitation Diagnosis  1. Impaired Mobility and ADLs  2. Diabetic infection of right foot  2. S/P Incision and drainage, with decompression of multiple subfascial layers, right foot; Wound debridement, skin, soft tissue, and muscle, right foot (9/9/2021 - Dr. Cassia Veliz)  3.  S/P Incision and drainage of right foot (9/4/2021 - Dr. Misha Malloy.)    Comorbidities  Patient Active Problem List   Diagnosis Code    [facility] E11.628, L08.9    Essential hypertension I10    Vitamin D insufficiency E55.9    Impaired mobility and ADLs Z74.09, Z78.9    Type 2 diabetes mellitus without complication, with long-term current use of insulin (HCC) E11.9, Z79.4  COVID-19 ruled out by laboratory testing Z20.822    Status post incision and drainage Z98.890    History of incision and drainage Z98.890    Hyperthyroidism E05.90       Plan:     1. Justification for continued stay: Good progression towards established rehabilitation goals. 2. Medical Issues being followed closely:    [x]  Fall and safety precautions     [x]  Wound Care     [x]  Bowel and Bladder Function     [x]  Fluid Electrolyte and Nutrition Balance     [x]  Pain Control      3. Issues that 24 hour rehabilitation nursing is following:    [x]  Fall and safety precautions     [x]  Wound Care     [x]  Bowel and Bladder Function     [x]  Fluid Electrolyte and Nutrition Balance     [x]  Pain Control      [x]  Assistance with and education on in-room safety with transfers to and from the bed, wheelchair, toilet and shower. 4. Acute rehabilitation plan of care:    [x]  Continue current care and rehab. [x]  Physical Therapy           [x]  Occupational Therapy           []  Speech Therapy     []  Hold Rehab until further notice     5. Medications:    [x]  MAR Reviewed     [x]  Continue Present Medications     6. DVT Prophylaxis:      []  Enoxaparin     [x]  Unfractionated Heparin     []  Warfarin     []  NOAC     []  WYATT Stockings     []  Sequential Compression Device     []  None     7. Code status    [x]  Full code     []  Partial code     []  Do not intubate     []  Do not resuscitate     8. Orders:   > Diabetic infection of right foot; S/P Incision and drainage, with decompression of multiple subfascial layers, right foot; Wound debridement, skin, soft tissue, and muscle, right foot (9/9/2021 - Dr. Brayden Buckley); S/P Incision and drainage of right foot (9/4/2021 - Dr. Jose Aguilar.)   > Nonweightbearing on the right foot   > Wound care nurse consult for WoundVac management   > Wound care instructions: Staff nurse to perform wound vac dressing changes Monday, Wednesday and fridays. Remove old dressing from right plantar foot wound and clean site  with wound spray. Apply skin prep  followed barrier ring then drapes. Cut hole over wound, apply adaptic  to wound bed then pack wound with black foam. Applied drapes  followed by diskus.  Settings 125mmhg continuous.    > On Keflex through September 28    > Essential hypertension   > Continue lisinopril, metoprolol. As needed hydralazine    > Hyperthyroidism   > On methimazole    > Type 2 diabetes mellitus without complication, with long-term current use of insulin   > Lantus and sliding scale    > Vitamin D insufficiency   Supplement    > Analgesia   > Tylenol, Percocet as needed    > Diet:   > Diabetic  Bowel regimen    9. Personal Protective Equipment (AP66 face mask) was used while interacting with the patient. Patient was using a surgical mask.     Discussed with patient      Signed:    Cristin Granger MD      September 22, 2021

## 2021-09-22 NOTE — ROUTINE PROCESS
SHIFT CHANGE NOTE FOR [facility]    Bedside and Verbal shift change report given to Daquan Lobo, RN (oncoming nurse) by Milton Malone RN (offgoing nurse). Report included the following information SBAR, Kardex, MAR and Recent Results. Situation:   Code Status: Full Code   Hospital Day: 4   Problem List:   Hospital Problems  Date Reviewed: 6/10/2021        Codes Class Noted POA    Essential hypertension (Chronic) ICD-10-CM: I10  ICD-9-CM: 401.9  Unknown Yes        Type 2 diabetes mellitus without complication, with long-term current use of insulin (HCC) (Chronic) ICD-10-CM: E11.9, Z79.4  ICD-9-CM: 250.00, V58.67  Unknown Yes    Overview Signed 9/20/2021 11:13 PM by Rachel Fajardo MD     HbA1c (9/3/2021) = 10.8             Hyperthyroidism (Chronic) ICD-10-CM: E05.90  ICD-9-CM: 242.90  Unknown Yes        Vitamin D insufficiency (Chronic) ICD-10-CM: E55.9  ICD-9-CM: 268.9  9/19/2021 Yes    Overview Signed 9/20/2021 11:11 PM by Rachel Fajardo MD     Vitamin D 25-Hydroxy (9/19/2021) = 24.0             Status post incision and drainage ICD-10-CM: Z98.890  ICD-9-CM: V45.89  9/9/2021 Yes    Overview Signed 9/20/2021 11:18 PM by Rachel Fajardo MD     S/P Incision and drainage, with decompression of multiple subfascial layers, right foot; Wound debridement, skin, soft tissue, and muscle, right foot (9/9/2021 - Dr. Torrey Lomeli)             [id number] ruled out by laboratory testing ICD-10-CM: Z20.822  ICD-9-CM: V01.79  9/8/2021 Yes    Overview Signed 9/20/2021 11:14 PM by Rachel Fajardo MD     SARS-CoV-2 ([address] [place] [address]) (9/8/2021):  Not detected             History of incision and drainage ICD-10-CM: Z98.890  ICD-9-CM: V45.89  9/4/2021 Yes    Overview Signed 9/20/2021 11:18 PM by Rachel Fajardo MD     S/P Incision and drainage of right foot (9/4/2021 - Dr. Ardena Moritz.)             * (Principal) Diabetic infection of right foot [facility]) ICD-10-CM: E11.628, L08.9  ICD-9-CM: 250.80, 686.9  9/2/2021 Yes Impaired mobility and ADLs ICD-10-CM: Z74.09, Z78.9  ICD-9-CM: V49.89  9/2/2021 Yes              Background:   Past Medical History:   Past Medical History:   Diagnosis Date    COVID-19 ruled out by laboratory testing 9/8/2021    [facility] (9/8/2021):  Not detected    Diabetic infection of right foot ([address] 9/2/2021    Essential hypertension     Hyperthyroidism     Type 2 diabetes mellitus without complication, with long-term current use of insulin ([facility])     HbA1c (9/3/2021) = 10.8    Vitamin D insufficiency 9/19/2021    Vitamin D 25-Hydroxy (9/19/2021) = 24.0        Assessment:   Changes in Assessment throughout shift: No change to previous assessment     Patient has a central line: no  Patient has Bal Cath: no        Last Vitals:     Vitals:    09/21/21 1602 09/21/21 2126 09/22/21 0736 09/22/21 1546   BP: 136/74 (!) 162/85 (!) 155/88 (!) 146/83   Pulse: 94 93 99 90   Resp: 19 18 16 18   Temp: 98.1 °F (36.7 °C) 98.2 °F (36.8 °C) 98.7 °F (37.1 °C) 98.5 °F (36.9 °C)   SpO2: 99% 100% 100% 99%   Weight:       Height:            PAIN    Pain Assessment    Pain Intensity 1: 0 (09/22/21 1631) Pain Intensity 1: 2 (12/29/14 1105)    Pain Location 1: Foot Pain Location 1:      Pain Intervention(s) 1:  (medicated prior to dressing change) Pain Intervention(s) 1: Medication (see MAR)  Patient Stated Pain Goal: 0 Patient Stated Pain Goal: 0  o Intervention effective: no  o Other actions taken for pain:  no complaint of pain     Skin Assessment  Skin color Skin Color: Appropriate for ethnicity  Condition/Temperature Skin Condition/Temp: Warm, Dry  Integrity Skin Integrity: Wound (add Wound LDA)  Turgor Turgor: Non-tenting  Weekly Pressure Ulcer Documentation  Pressure  Injury Documentation: No Pressure Injury Noted-Pressure Ulcer Prevention Initiated  Wound Prevention & Protection Methods  Orientation of wound Orientation of Wound Prevention: Posterior  Location of Prevention Location of Wound Prevention: Sacrum/Coccyx  Dressing Present Dressing Present : No  Dressing Status    Wound Offloading Wound Offloading (Prevention Methods): Bed, pressure reduction mattress     INTAKE/OUPUT  Date 09/21/21 1900 - 09/22/21 0659 09/22/21 0700 - 09/23/21 0659   Shift 6000-0696 24 Hour Total 3475-6165 1262-9988 24 Hour Total   INTAKE   P.O.  480 720  720     P. O.  480 720  720   Shift Total(mL/kg)  480(5) 720(7.5)  720(7.5)   OUTPUT   Urine(mL/kg/hr)          Urine Occurrence(s) 2 x 4 x 2 x  2 x   Stool          Stool Occurrence(s) 0 x 1 x 0 x  0 x   Shift Total(mL/kg)        NET  480 720  720   Weight (kg) 96 96 96 96 96       Recommendations:  1. Patient needs and requests: Toileting for urination, encourage fluids    2. Pending tests/procedures: routine l;abs    Functional Level/Equipment: Partial (one person) W/C with assist x 1  Fall Precautions:   Fall risk precautions were reinforced with the patient; he was instructed to call for help prior to getting up. The following fall risk precautions were continued: bed/ chair alarms, door signage, yellow bracelet and socks as well as update of the Leafy Arreola tool in the patient's room. Willy Score: 3    HEALS Safety Check    A safety check occurred in the patient's room between off going nurse and oncoming nurse listed above.     The safety check included the below items  Area Items   H  High Alert Medications - Verify all high alert medication drips (heparin, PCA, etc.)   E  Equipment - Suction is set up for ALL patients (with yanker)  - Red plugs utilized for all equipment (IV pumps, etc.)  - WOWs wiped down at end of shift.  - Room stocked with oxygen, suction, and other unit-specific supplies   A  Alarms - Bed alarm is set for fall risk patients  - Ensure chair alarm is in place and activated if patient is up in a chair   L  Lines - Check IV for any infiltration  - Bal bag is empty if patient has a Bal   - Tubing and IV bags are labeled   S  Safety   - Room is clean, patient is clean, and equipment is clean. - Hallways are clear from equipment besides carts. - Fall bracelet on for fall risk patients  - Ensure room is clear and free of clutter  - Suction is set up for ALL patients (with roman)  - Hallways are clear from equipment besides carts.    - Isolation precautions followed, supplies available outside room, sign posted     Anjali Leo RN

## 2021-09-23 LAB
GLUCOSE BLD STRIP.AUTO-MCNC: 126 MG/DL (ref 70–110)
GLUCOSE BLD STRIP.AUTO-MCNC: 188 MG/DL (ref 70–110)
GLUCOSE BLD STRIP.AUTO-MCNC: 259 MG/DL (ref 70–110)
GLUCOSE BLD STRIP.AUTO-MCNC: 84 MG/DL (ref 70–110)
HCT VFR BLD AUTO: 30.3 % (ref 36–48)
HGB BLD-MCNC: 10 G/DL (ref 13–16)
PLATELET # BLD AUTO: 488 K/UL (ref 135–420)

## 2021-09-23 PROCEDURE — 97110 THERAPEUTIC EXERCISES: CPT

## 2021-09-23 PROCEDURE — 97116 GAIT TRAINING THERAPY: CPT

## 2021-09-23 PROCEDURE — 82962 GLUCOSE BLOOD TEST: CPT

## 2021-09-23 PROCEDURE — 99232 SBSQ HOSP IP/OBS MODERATE 35: CPT | Performed by: EMERGENCY MEDICINE

## 2021-09-23 PROCEDURE — 97530 THERAPEUTIC ACTIVITIES: CPT

## 2021-09-23 PROCEDURE — 36415 COLL VENOUS BLD VENIPUNCTURE: CPT

## 2021-09-23 PROCEDURE — 74011250636 HC RX REV CODE- 250/636: Performed by: INTERNAL MEDICINE

## 2021-09-23 PROCEDURE — 74011636637 HC RX REV CODE- 636/637: Performed by: INTERNAL MEDICINE

## 2021-09-23 PROCEDURE — 74011250637 HC RX REV CODE- 250/637: Performed by: INTERNAL MEDICINE

## 2021-09-23 PROCEDURE — 74011250637 HC RX REV CODE- 250/637: Performed by: EMERGENCY MEDICINE

## 2021-09-23 PROCEDURE — 85018 HEMOGLOBIN: CPT

## 2021-09-23 PROCEDURE — 85049 AUTOMATED PLATELET COUNT: CPT

## 2021-09-23 PROCEDURE — 65310000000 HC RM PRIVATE REHAB

## 2021-09-23 PROCEDURE — 74011250637 HC RX REV CODE- 250/637: Performed by: FAMILY MEDICINE

## 2021-09-23 PROCEDURE — 97112 NEUROMUSCULAR REEDUCATION: CPT

## 2021-09-23 RX ADMIN — INSULIN LISPRO 6 UNITS: 100 INJECTION, SOLUTION INTRAVENOUS; SUBCUTANEOUS at 17:05

## 2021-09-23 RX ADMIN — INSULIN LISPRO 2 UNITS: 100 INJECTION, SOLUTION INTRAVENOUS; SUBCUTANEOUS at 12:02

## 2021-09-23 RX ADMIN — FAMOTIDINE 20 MG: 20 TABLET, FILM COATED ORAL at 09:37

## 2021-09-23 RX ADMIN — DOCUSATE SODIUM 100 MG: 100 CAPSULE, LIQUID FILLED ORAL at 09:37

## 2021-09-23 RX ADMIN — LISINOPRIL 20 MG: 20 TABLET ORAL at 09:36

## 2021-09-23 RX ADMIN — POLYETHYLENE GLYCOL 3350 17 G: 17 POWDER, FOR SOLUTION ORAL at 09:34

## 2021-09-23 RX ADMIN — CEPHALEXIN 500 MG: 250 CAPSULE ORAL at 17:05

## 2021-09-23 RX ADMIN — HEPARIN SODIUM 5000 UNITS: 5000 INJECTION INTRAVENOUS; SUBCUTANEOUS at 09:38

## 2021-09-23 RX ADMIN — CEPHALEXIN 500 MG: 250 CAPSULE ORAL at 23:36

## 2021-09-23 RX ADMIN — HEPARIN SODIUM 5000 UNITS: 5000 INJECTION INTRAVENOUS; SUBCUTANEOUS at 17:06

## 2021-09-23 RX ADMIN — METHIMAZOLE 10 MG: 10 TABLET ORAL at 09:35

## 2021-09-23 RX ADMIN — FAMOTIDINE 20 MG: 20 TABLET, FILM COATED ORAL at 17:05

## 2021-09-23 RX ADMIN — CEPHALEXIN 500 MG: 250 CAPSULE ORAL at 12:02

## 2021-09-23 RX ADMIN — DOCUSATE SODIUM 100 MG: 100 CAPSULE, LIQUID FILLED ORAL at 17:05

## 2021-09-23 RX ADMIN — CEPHALEXIN 500 MG: 250 CAPSULE ORAL at 05:43

## 2021-09-23 RX ADMIN — HEPARIN SODIUM 5000 UNITS: 5000 INJECTION INTRAVENOUS; SUBCUTANEOUS at 01:37

## 2021-09-23 RX ADMIN — Medication 5000 UNITS: at 09:36

## 2021-09-23 RX ADMIN — METOPROLOL TARTRATE 25 MG: 25 TABLET, FILM COATED ORAL at 21:16

## 2021-09-23 RX ADMIN — INSULIN GLARGINE 15 UNITS: 100 INJECTION, SOLUTION SUBCUTANEOUS at 21:17

## 2021-09-23 RX ADMIN — METOPROLOL TARTRATE 25 MG: 25 TABLET, FILM COATED ORAL at 09:35

## 2021-09-23 NOTE — PROGRESS NOTES
[address]  [address] [place] [address], [address]     INPATIENT REHABILITATION  DAILY PROGRESS NOTE     Date: 9/23/2021    Name: Chetan Sexton Age / Sex: 60 y.o. / male   CSN: [id number] MRN: [id number]   [facility] [place] [facility] Date: 9/18/2021 Length of Stay: 5 days     Primary Rehab Diagnosis: Impaired Mobility and ADLs secondary to:  1. Diabetic infection of right foot  2. S/P Incision and drainage, with decompression of multiple subfascial layers, right foot; Wound debridement, skin, soft tissue, and muscle, right foot (9/9/2021 - Dr. Laurie Ramos)  3. S/P Incision and drainage of right foot (9/4/2021 - Dr. Rosalba Cee.)      Subjective:     Patient is sitting in bed in no apparent distress, awake and alert.   In good spirits  Objective:     Vital Signs:  Patient Vitals for the past 24 hrs:   BP Temp Pulse Resp SpO2   09/23/21 1614 (!) 142/84 98.8 °F (37.1 °C) 91 18 99 %   09/23/21 0811 (!) 151/89 98.7 °F (37.1 °C) 95 18 99 %   09/22/21 2107 (!) 147/87 98 °F (36.7 °C) 92 18 100 %        Physical Examination:  General:  Awake, alert  Cardiovascular:  S1S2+, RRR  Pulmonary:  CTA b/l  GI:  Soft, BS+, NT, ND  Extremities: Right foot with wound VAC             Current Medications:  Current Facility-Administered Medications   Medication Dose Route Frequency    polyethylene glycol (MIRALAX) packet 17 g  17 g Oral DAILY    insulin lispro (HUMALOG) injection   SubCUTAneous TIDAC    heparin (porcine) injection 5,000 Units  5,000 Units SubCUTAneous Q8H    insulin glargine (LANTUS) injection 15 Units  15 Units SubCUTAneous QHS    lisinopriL (PRINIVIL, ZESTRIL) tablet 20 mg  20 mg Oral DAILY    metoprolol tartrate (LOPRESSOR) tablet 25 mg  25 mg Oral Q12H    methIMAzole (TAPAZOLE) tablet 10 mg  10 mg Oral DAILY    oxyCODONE-acetaminophen (PERCOCET) 5-325 mg per tablet 1-2 Tablet  1-2 Tablet Oral Q6H PRN    acetaminophen (TYLENOL) tablet 650 mg  650 mg Oral Q4H PRN    cholecalciferol (VITAMIN D3) capsule 5,000 Units  5,000 Units Oral DAILY    famotidine (PEPCID) tablet 20 mg  20 mg Oral BID    docusate sodium (COLACE) capsule 100 mg  100 mg Oral BID    magnesium hydroxide (MILK OF MAGNESIA) 400 mg/5 mL oral suspension 30 mL  30 mL Oral DAILY PRN    bisacodyL (DULCOLAX) tablet 10 mg  10 mg Oral Q48H PRN    glucose chewable tablet 16 g  4 Tablet Oral PRN    glucagon (GLUCAGEN) injection 1 mg  1 mg IntraMUSCular PRN    dextrose (D50W) injection syrg 12.5-25 g  25-50 mL IntraVENous PRN    cephALEXin (KEFLEX) capsule 500 mg  500 mg Oral Q6H    hydrALAZINE (APRESOLINE) tablet 25 mg  25 mg Oral Q8H PRN       Allergies:  No Known Allergies    Lab/Data Review:  Recent Results (from the past 24 hour(s))   GLUCOSE, POC    Collection Time: 09/22/21  9:13 PM   Result Value Ref Range    Glucose (POC) 180 (H) 70 - 110 mg/dL   HGB & HCT    Collection Time: 09/23/21  6:13 AM   Result Value Ref Range    HGB 10.0 (L) 13.0 - 16.0 g/dL    HCT 30.3 (L) 36.0 - 48.0 %   PLATELET COUNT    Collection Time: 09/23/21  6:13 AM   Result Value Ref Range    PLATELET 348 (H) 645 - 420 K/uL   GLUCOSE, POC    Collection Time: 09/23/21  8:19 AM   Result Value Ref Range    Glucose (POC) 84 70 - 110 mg/dL   GLUCOSE, POC    Collection Time: 09/23/21 11:49 AM   Result Value Ref Range    Glucose (POC) 188 (H) 70 - 110 mg/dL   GLUCOSE, POC    Collection Time: 09/23/21  4:53 PM   Result Value Ref Range    Glucose (POC) 259 (H) 70 - 110 mg/dL       Assessment:     Primary Rehabilitation Diagnosis  1. Impaired Mobility and ADLs  2. Diabetic infection of right foot  2. S/P Incision and drainage, with decompression of multiple subfascial layers, right foot; Wound debridement, skin, soft tissue, and muscle, right foot (9/9/2021 - Dr. Kati Boss)  3.  S/P Incision and drainage of right foot (9/4/2021 - Dr. Naresh Venegas.)    Comorbidities  Patient Active Problem List   Diagnosis Code    Diabetic infection of right foot ([facility] E11.628, L08.9    Essential hypertension I10    Vitamin D insufficiency E55.9    Impaired mobility and ADLs Z74.09, Z78.9    Type 2 diabetes mellitus without complication, with long-term current use of insulin ([facility] E11.9, Z79.4    COVID-19 ruled out by laboratory testing Z20.822    Status post incision and drainage Z98.890    History of incision and drainage Z98.890    Hyperthyroidism E05.90       Plan:     1. Justification for continued stay: Good progression towards established rehabilitation goals. 2. Medical Issues being followed closely:    [x]  Fall and safety precautions     [x]  Wound Care     [x]  Bowel and Bladder Function     [x]  Fluid Electrolyte and Nutrition Balance     [x]  Pain Control      3. Issues that 24 hour rehabilitation nursing is following:    [x]  Fall and safety precautions     [x]  Wound Care     [x]  Bowel and Bladder Function     [x]  Fluid Electrolyte and Nutrition Balance     [x]  Pain Control      [x]  Assistance with and education on in-room safety with transfers to and from the bed, wheelchair, toilet and shower. 4. Acute rehabilitation plan of care:    [x]  Continue current care and rehab. [x]  Physical Therapy           [x]  Occupational Therapy           []  Speech Therapy     []  Hold Rehab until further notice     5. Medications:    [x]  MAR Reviewed     [x]  Continue Present Medications     6. DVT Prophylaxis:      []  Enoxaparin     [x]  Unfractionated Heparin     []  Warfarin     []  NOAC     []  WYATT Stockings     []  Sequential Compression Device     []  None     7. Code status    [x]  Full code     []  Partial code     []  Do not intubate     []  Do not resuscitate     8. Orders:   > Diabetic infection of right foot; S/P Incision and drainage, with decompression of multiple subfascial layers, right foot;  Wound debridement, skin, soft tissue, and muscle, right foot (9/9/2021 - Dr. Pedrito Mckeon); S/P Incision and drainage of right foot (9/4/2021 - Dr. Skyla Macdonald.)   > Nonweightbearing on the right foot   > Wound care nurse consult for WoundVac management   > Wound care instructions: Staff nurse to perform wound vac dressing changes Monday, Wednesday and fridays. Remove old dressing from right plantar foot wound and clean site  with wound spray. Apply skin prep  followed barrier ring then drapes. Cut hole over wound, apply adaptic  to wound bed then pack wound with black foam. Applied drapes  followed by diskus.  Settings 125mmhg continuous.    > On Keflex through September 28    > Essential hypertension   > Continue lisinopril and metoprolol. As needed hydralazine    > Hyperthyroidism   > Continue methimazole    > Type 2 diabetes mellitus without complication, with long-term current use of insulin   > Continue Lantus, sliding scale insulin    > Vitamin D insufficiency   Supplement    > Analgesia   > Tylenol, Percocet as needed    > Diet:   > Diabetic  Bowel regimen    9. Personal Protective Equipment (SZ29 face mask) was used while interacting with the patient. Patient was using a surgical mask.     I discussed with patient      Signed:    Naima Arndt MD      September 23, 2021

## 2021-09-23 NOTE — ROUTINE PROCESS
SHIFT CHANGE NOTE FOR [facility]    Bedside and Verbal shift change report given to Danay Ferrer RN (oncoming nurse) by Taylor Dewitt RN (offgoing nurse). Report included the following information SBAR, Kardex, MAR and Recent Results. Situation:   Code Status: Full Code   Hospital Day: 5   Problem List:   Hospital Problems  Date Reviewed: 6/10/2021        Codes Class Noted POA    Essential hypertension (Chronic) ICD-10-CM: I10  ICD-9-CM: 401.9  Unknown Yes        Type 2 diabetes mellitus without complication, with long-term current use of insulin (HCC) (Chronic) ICD-10-CM: E11.9, Z79.4  ICD-9-CM: 250.00, V58.67  Unknown Yes    Overview Signed 9/20/2021 11:13 PM by Colby Koch MD     HbA1c (9/3/2021) = 10.8             Hyperthyroidism (Chronic) ICD-10-CM: E05.90  ICD-9-CM: 242.90  Unknown Yes        Vitamin D insufficiency (Chronic) ICD-10-CM: E55.9  ICD-9-CM: 268.9  9/19/2021 Yes    Overview Signed 9/20/2021 11:11 PM by Colby Koch MD     Vitamin D 25-Hydroxy (9/19/2021) = 24.0             Status post incision and drainage ICD-10-CM: Z98.890  ICD-9-CM: V45.89  9/9/2021 Yes    Overview Signed 9/20/2021 11:18 PM by Colby Koch MD     S/P Incision and drainage, with decompression of multiple subfascial layers, right foot; Wound debridement, skin, soft tissue, and muscle, right foot (9/9/2021 - Dr. Maricruz Khanna)             SDVYR-86 ruled out by laboratory testing ICD-10-CM: Z20.822  ICD-9-CM: V01.79  9/8/2021 Yes    Overview Signed 9/20/2021 11:14 PM by Colby Koch MD     SARS-CoV-2 ([address] [place] [address]) (9/8/2021):  Not detected             History of incision and drainage ICD-10-CM: Z98.890  ICD-9-CM: V45.89  9/4/2021 Yes    Overview Signed 9/20/2021 11:18 PM by Colby Koch MD     S/P Incision and drainage of right foot (9/4/2021 - Dr. Juan Jose Greenwood.)             * (Principal) Diabetic infection of right foot [facility]) ICD-10-CM: E11.628, L08.9  ICD-9-CM: 250.80, 686.9  9/2/2021 Yes Impaired mobility and ADLs ICD-10-CM: Z74.09, Z78.9  ICD-9-CM: V49.89  9/2/2021 Yes              Background:   Past Medical History:   Past Medical History:   Diagnosis Date    COVID-19 ruled out by laboratory testing 9/8/2021    SARS-CoV-2 ([facility]) (9/8/2021):  Not detected    Diabetic infection of right foot ([address] 9/2/2021    Essential hypertension     Hyperthyroidism     Type 2 diabetes mellitus without complication, with long-term current use of insulin ([facility])     HbA1c (9/3/2021) = 10.8    Vitamin D insufficiency 9/19/2021    Vitamin D 25-Hydroxy (9/19/2021) = 24.0        Assessment:   Changes in Assessment throughout shift: No change to previous assessment     Patient has a central line: no  Patient has Bal Cath: no        Last Vitals:     Vitals:    09/21/21 2126 09/22/21 0736 09/22/21 1546 09/22/21 2107   BP: (!) 162/85 (!) 155/88 (!) 146/83 (!) 147/87   Pulse: 93 99 90 92   Resp: 18 16 18 18   Temp: 98.2 °F (36.8 °C) 98.7 °F (37.1 °C) 98.5 °F (36.9 °C) 98 °F (36.7 °C)   SpO2: 100% 100% 99% 100%   Weight:       Height:            PAIN    Pain Assessment    Pain Intensity 1: 0 (09/23/21 0400) Pain Intensity 1: 2 (12/29/14 1105)    Pain Location 1: Foot Pain Location 1:      Pain Intervention(s) 1:  (medicated prior to dressing change) Pain Intervention(s) 1: Medication (see MAR)  Patient Stated Pain Goal: 0 Patient Stated Pain Goal: 0  o Intervention effective: no  o Other actions taken for pain:  no complaint of pain     Skin Assessment  Skin color Skin Color: Appropriate for ethnicity  Condition/Temperature Skin Condition/Temp: Dry, Warm  Integrity Skin Integrity: Wound (add Wound LDA)  Turgor Turgor: Non-tenting  Weekly Pressure Ulcer Documentation  Pressure  Injury Documentation: No Pressure Injury Noted-Pressure Ulcer Prevention Initiated  Wound Prevention & Protection Methods  Orientation of wound Orientation of Wound Prevention: Posterior  Location of Prevention Location of Wound Prevention: Buttocks, Sacrum/Coccyx  Dressing Present Dressing Present : No  Dressing Status    Wound Offloading Wound Offloading (Prevention Methods): Bed, pressure redistribution/air     INTAKE/OUPUT  Date 09/22/21 0700 - 09/23/21 0659 09/23/21 0700 - 09/24/21 0659   Shift 1874-9247 2370-3160 24 Hour Total 9408-6993 5937-5227 24 Hour Total   INTAKE   P.O. 720  720        P. O. 720  720      Shift Total(mL/kg) 720(7.5)  720(7.5)      OUTPUT   Urine(mL/kg/hr)           Urine Occurrence(s) 2 x 2 x 4 x      Stool           Stool Occurrence(s) 0 x 0 x 0 x      Shift Total(mL/kg)           720      Weight (kg) 96 96 96 96 96 96       Recommendations:  1. Patient needs and requests: Toileting for urination, encourage fluids    2. Pending tests/procedures: routine l;abs    Functional Level/Equipment: Partial (one person) W/C with assist x 1  Fall Precautions:   Fall risk precautions were reinforced with the patient; he was instructed to call for help prior to getting up. The following fall risk precautions were continued: bed/ chair alarms, door signage, yellow bracelet and socks as well as update of the Antoinette Marines tool in the patient's room. Willy Score:      HEALS Safety Check    A safety check occurred in the patient's room between off going nurse and oncoming nurse listed above.     The safety check included the below items  Area Items   H  High Alert Medications - Verify all high alert medication drips (heparin, PCA, etc.)   E  Equipment - Suction is set up for ALL patients (with yanker)  - Red plugs utilized for all equipment (IV pumps, etc.)  - WOWs wiped down at end of shift.  - Room stocked with oxygen, suction, and other unit-specific supplies   A  Alarms - Bed alarm is set for fall risk patients  - Ensure chair alarm is in place and activated if patient is up in a chair   L  Lines - Check IV for any infiltration  - Bal bag is empty if patient has a Bal   - Tubing and IV bags are labeled   S  Safety - Room is clean, patient is clean, and equipment is clean. - Hallways are clear from equipment besides carts. - Fall bracelet on for fall risk patients  - Ensure room is clear and free of clutter  - Suction is set up for ALL patients (with darwinker)  - Hallways are clear from equipment besides carts.    - Isolation precautions followed, supplies available outside room, sign posted     Taylor Dewitt RN

## 2021-09-23 NOTE — ROUTINE PROCESS
SHIFT CHANGE NOTE FOR [facility]    Bedside and Verbal shift change report given to Sadia Torres, RN (oncoming nurse) by Jeff Mustafa RN (offgoing nurse). Report included the following information SBAR, Kardex, MAR and Recent Results. Situation:   Code Status: Full Code   Hospital Day: 5   Problem List:   Hospital Problems  Date Reviewed: 6/10/2021        Codes Class Noted POA    Essential hypertension (Chronic) ICD-10-CM: I10  ICD-9-CM: 401.9  Unknown Yes        Type 2 diabetes mellitus without complication, with long-term current use of insulin (HCC) (Chronic) ICD-10-CM: E11.9, Z79.4  ICD-9-CM: 250.00, V58.67  Unknown Yes    Overview Signed 9/20/2021 11:13 PM by Helen Munoz MD     HbA1c (9/3/2021) = 10.8             Hyperthyroidism (Chronic) ICD-10-CM: E05.90  ICD-9-CM: 242.90  Unknown Yes        Vitamin D insufficiency (Chronic) ICD-10-CM: E55.9  ICD-9-CM: 268.9  9/19/2021 Yes    Overview Signed 9/20/2021 11:11 PM by Helen Munoz MD     Vitamin D 25-Hydroxy (9/19/2021) = 24.0             Status post incision and drainage ICD-10-CM: Z98.890  ICD-9-CM: V45.89  9/9/2021 Yes    Overview Signed 9/20/2021 11:18 PM by Helen Munoz MD     S/P Incision and drainage, with decompression of multiple subfascial layers, right foot; Wound debridement, skin, soft tissue, and muscle, right foot (9/9/2021 - Dr. Kee Guardiraymundo)             [id number] ruled out by laboratory testing ICD-10-CM: Z20.822  ICD-9-CM: V01.79  9/8/2021 Yes    Overview Signed 9/20/2021 11:14 PM by Helen Munoz MD     SARS-CoV-2 ([address] [place] [address]) (9/8/2021):  Not detected             History of incision and drainage ICD-10-CM: Z98.890  ICD-9-CM: V45.89  9/4/2021 Yes    Overview Signed 9/20/2021 11:18 PM by Helen Munoz MD     S/P Incision and drainage of right foot (9/4/2021 - Dr. Indra Castillo.)             * (Principal) Diabetic infection of right foot [facility]) ICD-10-CM: E11.628, L08.9  ICD-9-CM: 250.80, 686.9  9/2/2021 Yes Impaired mobility and ADLs ICD-10-CM: Z74.09, Z78.9  ICD-9-CM: V49.89  9/2/2021 Yes              Background:   Past Medical History:   Past Medical History:   Diagnosis Date    COVID-19 ruled out by laboratory testing 9/8/2021    SARS-CoV-2 ([facility]) (9/8/2021):  Not detected    Diabetic infection of right foot ([address] 9/2/2021    Essential hypertension     Hyperthyroidism     Type 2 diabetes mellitus without complication, with long-term current use of insulin ([facility])     HbA1c (9/3/2021) = 10.8    Vitamin D insufficiency 9/19/2021    Vitamin D 25-Hydroxy (9/19/2021) = 24.0        Assessment:   Changes in Assessment throughout shift: No change to previous assessment     Patient has a central line: no  Patient has Bal Cath: no        Last Vitals:     Vitals:    09/22/21 0736 09/22/21 1546 09/22/21 2107 09/23/21 0811   BP: (!) 155/88 (!) 146/83 (!) 147/87 (!) 151/89   Pulse: 99 90 92 95   Resp: 16 18 18 18   Temp: 98.7 °F (37.1 °C) 98.5 °F (36.9 °C) 98 °F (36.7 °C) 98.7 °F (37.1 °C)   SpO2: 100% 99% 100% 99%   Weight:       Height:            PAIN    Pain Assessment    Pain Intensity 1: 0 (09/23/21 1140) Pain Intensity 1: 2 (12/29/14 1105)    Pain Location 1: Foot Pain Location 1:      Pain Intervention(s) 1:  (medicated prior to dressing change) Pain Intervention(s) 1: Medication (see MAR)  Patient Stated Pain Goal: 0 Patient Stated Pain Goal: 0  o Intervention effective: no  o Other actions taken for pain:  no complaint of pain     Skin Assessment  Skin color Skin Color: Appropriate for ethnicity  Condition/Temperature Skin Condition/Temp: Warm, Dry  Integrity Skin Integrity: Wound (add Wound LDA)  Turgor Turgor: Non-tenting  Weekly Pressure Ulcer Documentation  Pressure  Injury Documentation: No Pressure Injury Noted-Pressure Ulcer Prevention Initiated  Wound Prevention & Protection Methods  Orientation of wound Orientation of Wound Prevention: Posterior  Location of Prevention Location of Wound Prevention: Buttocks, Heel, Sacrum/Coccyx  Dressing Present Dressing Present : No  Dressing Status    Wound Offloading Wound Offloading (Prevention Methods): Adaptive equipment, Bed, pressure reduction mattress, Chair cushion, Elevate heels, Pillows, Repositioning, Turning, [employer], Wheelchair     INTAKE/OUPUT  Date 09/22/21 0700 - 09/23/21 0659 09/23/21 0700 - 09/24/21 0659   Shift 0700-1859 2403-9192 24 Hour Total 0700-1859 0522-3712 24 Hour Total   INTAKE   P.O. 720  720 477  477     P. O. 720  720 477  477   Shift Total(mL/kg) 720(7.5)  720(7.5) 477(5)  477(5)   OUTPUT   Urine(mL/kg/hr)           Urine Occurrence(s) 2 x 2 x 4 x 1 x  1 x   Stool           Stool Occurrence(s) 0 x 0 x 0 x 1 x  1 x   Shift Total(mL/kg)           720 477  477   Weight (kg) 96 96 96 96 96 96       Recommendations:  1. Patient needs and requests: Toileting for urination, encourage fluids    2. Pending tests/procedures: routine l;abs    Functional Level/Equipment: Partial (one person) W/C with assist x 1  Fall Precautions:   Fall risk precautions were reinforced with the patient; he was instructed to call for help prior to getting up. The following fall risk precautions were continued: bed/ chair alarms, door signage, yellow bracelet and socks as well as update of the [place] tool in the patient's room. Willy Score: 3    HEALS Safety Check    A safety check occurred in the patient's room between off going nurse and oncoming nurse listed above.     The safety check included the below items  Area Items   H  High Alert Medications - Verify all high alert medication drips (heparin, PCA, etc.)   E  Equipment - Suction is set up for ALL patients (with yanker)  - Red plugs utilized for all equipment (IV pumps, etc.)  - WOWs wiped down at end of shift.  - Room stocked with oxygen, suction, and other unit-specific supplies   A  Alarms - Bed alarm is set for fall risk patients  - Ensure chair alarm is in place and activated if patient is up in a chair   L  Lines - Check IV for any infiltration  - Bal bag is empty if patient has a Bal   - Tubing and IV bags are labeled   S  Safety   - Room is clean, patient is clean, and equipment is clean. - Hallways are clear from equipment besides carts. - Fall bracelet on for fall risk patients  - Ensure room is clear and free of clutter  - Suction is set up for ALL patients (with yanker)  - Hallways are clear from equipment besides carts.    - Isolation precautions followed, supplies available outside room, sign posted     Rachael Cagle RN

## 2021-09-23 NOTE — ROUTINE PROCESS
SPEECH-LANGUAGE PATHOLOGY    Consult received. Patient's chart reviewed. No needs identified for SLP intervention. SLP will not actively follow.     Savannah Handy, SURY-SLP

## 2021-09-23 NOTE — PROGRESS NOTES
Problem: Mobility Impaired (Adult and Pediatric)  Goal: *Therapy Goal (Edit Goal, Insert Text)  Description: Physical Therapy Short Term Goals  Initiated 9/20/2021 and to be accomplished within 7 day(s) [9/27/21]  1. Patient will move from supine to sit and sit to supine  and roll side to side in bed with modified independence. 2.  Patient will transfer from bed to chair and chair to bed with Stand-by assistance using the least restrictive device, NWB R LE.  3.  Patient will perform sit to stand with Stand-by assistance to RW and NWB R LE.  4.  Patient will ambulate with contact guard assist for 100 feet with RW, NWB R LE and hop method. 5.  Patient will propel w/c 150 ft with supervision on level surfaces using B UE's and L LE. Physical Therapy Long Term Goals  Initiated 9/20/2021 and to be accomplished within 21 day(s) [10/11/21]  1. Patient will move from supine to sit and sit to supine , scoot up and down, and roll side to side in bed with independence. 2.  Patient will transfer from bed to chair and chair to bed with modified independence using the least restrictive device. 3.  Patient will perform sit to stand with modified independence and RW. 4.  Patient will ambulate with supervision for 150 feet with RW, NWB R LE, and hop method. 5.  Patient will ascend/descend 4 stairs with one handrail(s) with minimal assistance/contact guard assist.     Outcome: Progressing Towards Goal    PHYSICAL THERAPY TREATMENT    Patient: Roberta Nielsen (20 y.o. male)  Date: 9/23/2021  Diagnosis: Diabetic foot infection ([facility] [E11.628, L08.9] Diabetic infection of right foot ([address]  Precautions: Fall, NWB  Chart, physical therapy assessment, plan of care and goals were reviewed. Time IJ:[id number]  Time [id number]    Patient seen for: Balance activities;Gait training;Patient education;Transfer training; Wheelchair mobility     Time In:1033  Time Out: 1103    Patient seen for: Patient education;Transfer training; Wheelchair mobility; Therapeutic exercises    Pain:  Pt pain was reported as 0/10 pre-treatment. Pt pain was reported as 0/10 post-treatment. Intervention: N/A    Patient identified with name and : yes    SUBJECTIVE:      Pt is pleasant and cooperative throughout both treatment sessions. He tends to self-cue with mobility to talk himself through the process and appears very deliberate with initiating activities and thinking through processes. However, he does require cuing for safety with hand placement for multiple sit <> stand transfers during first treatment session with pt noting, \"it's alright; it'll take some time but I'll get it. \"    OBJECTIVE DATA SUMMARY:    Objective:     TRANSFERS Daily Assessment     Transfer Type: Other  Other: stand step with RW  Transfer Assistance : 4 (Minimal assistance) (CGA/Minimal assistance)  Sit to Stand Assistance: Minimal assistance   Pt requires CGA to minimal assistance for balance and safety with transfers due to functional weakness. Pt demonstrates inconsistent safety awareness with sit <> stand requiring intermittent cuing to not pull up on RW to stand. Pt also requires minimal to moderate verbal cues for safety with RW management negotiating turns for slower pace. GAIT Daily Assessment    Gait Description (WDL) Exceptions to WDL    Gait Abnormalities Decreased left foot clearance and step length, NWB right LE    Assistive device Walker, rolling    Ambulation assistance - level surface 4 (Contact guard assistance)    Distance 50 Feet (ft)    Ambulation assistance- uneven surface  NT    Comments Pt requires CGA for safety with moderate verbal cues for safe RW management to slow pace and clear left foot to step with negotiating turns.         STEPS/STAIRS Daily Assessment     Steps/Stairs ambulated  NT    Assistance Required Not challenged this treatment session    Rail Use  N/A    Comments   Pt participated in [address] in front of [facility] in preparation for stair training. Pt utilized RW for UE support to perform one set of 10 repetitions of left LE step ups onto a 2\" block and one set of 5 repetitions of left LE step ups onto a 4\" block. Pt requires CGA with intermittent min assist for balance. Curbs/Ramps  NT        BALANCE Daily Assessment     Sitting - Static: Good (unsupported)  Sitting - Dynamic: Fair (occasional)  Standing - Static: Fair  Standing - Dynamic : Impaired        WHEELCHAIR MOBILITY Daily Assessment     Able to Propel (ft): 56 feet (with supervision)  Wheelchair Setup Assist Required : 3 (Moderate assistance)  Wheelchair Management: Manages left brake;Manages right brake        THERAPEUTIC EXERCISES Daily Assessment     Seated LE Strength Training:  3 Sets of 10 Repetitions:  2# Right and Left LAQ  2# Alternate Hip Flexion  Green Theraband resisted Right and Left Hamstring curls        ASSESSMENT:  Pt is progressing with functional mobility requiring decreased physical assistance, ambulating household distances consistently without rest breaks, and progressing to participate in step ups in preparation to participate in stair negotiation. Progression toward goals:  [x]      Improving appropriately and progressing toward goals  []      Improving slowly and progressing toward goals  []      Not making progress toward goals and plan of care will be adjusted      PLAN:  Patient continues to benefit from skilled intervention to address the above impairments. Continue treatment per established plan of care. Emphasize functional strength training to promote increased independence and safety with mobility.   Discharge Recommendations:  Home Health Physical Therapy with 24 hour assistance  Further Equipment Recommendations for Discharge:  rolling walker, 18\" w/c with brake extenders, removable and elevating leg rests, gel cushion, anti-tippers      Estimated Discharge Date: 10/08/2021    Activity Tolerance:   Good  Please refer to the flowsheet for vital signs taken during this treatment.     After treatment:   [] Patient left in no apparent distress in bed  [x] Patient left in no apparent distress sitting up in chair  [] Patient left in no apparent distress in w/c mobilizing under own power  [] Patient left in no apparent distress dining area  [] Patient left in no apparent distress mobilizing under own power  [x] Call bell left within reach  [] Nursing notified  [] Caregiver present  [] Bed alarm activated   [x] Chair alarm activated      Jeanna Castaneda, PT, DPT  9/23/2021

## 2021-09-23 NOTE — PROGRESS NOTES
Received call from Rachel Soto at [address] [place] [address] regarding outpatient appt for F/U  for suture removal.  DC from [facility] scheduled for 10/8/21. Rachel Soto at [facility] to f/u w ortho there and get back to us as pt. needs to remain on unit until [address]. Also referred her to Dr Lyndsey Armijo  who is covering covering patient while on unit.

## 2021-09-23 NOTE — ROUTINE PROCESS
SHIFT CHANGE NOTE FOR [facility]    Bedside and Verbal shift change report given to Tea Castle, RN (oncoming nurse) by Shania Hutchinson RN (offgoing nurse). Report included the following information SBAR, Kardex, MAR and Recent Results. Situation:   Code Status: Full Code   Hospital Day: 5   Problem List:   Hospital Problems  Date Reviewed: 6/10/2021        Codes Class Noted POA    Essential hypertension (Chronic) ICD-10-CM: I10  ICD-9-CM: 401.9  Unknown Yes        Type 2 diabetes mellitus without complication, with long-term current use of insulin (HCC) (Chronic) ICD-10-CM: E11.9, Z79.4  ICD-9-CM: 250.00, V58.67  Unknown Yes    Overview Signed 9/20/2021 11:13 PM by Jonna Reddy MD     HbA1c (9/3/2021) = 10.8             Hyperthyroidism (Chronic) ICD-10-CM: E05.90  ICD-9-CM: 242.90  Unknown Yes        Vitamin D insufficiency (Chronic) ICD-10-CM: E55.9  ICD-9-CM: 268.9  9/19/2021 Yes    Overview Signed 9/20/2021 11:11 PM by Jonna Reddy MD     Vitamin D 25-Hydroxy (9/19/2021) = 24.0             Status post incision and drainage ICD-10-CM: Z98.890  ICD-9-CM: V45.89  9/9/2021 Yes    Overview Signed 9/20/2021 11:18 PM by Jonna Reddy MD     S/P Incision and drainage, with decompression of multiple subfascial layers, right foot; Wound debridement, skin, soft tissue, and muscle, right foot (9/9/2021 - Dr. Andres Sylvester)             NRHPQ-67 ruled out by laboratory testing ICD-10-CM: Z20.822  ICD-9-CM: V01.79  9/8/2021 Yes    Overview Signed 9/20/2021 11:14 PM by Jonna Reddy MD     SARS-CoV-2 ([address] [place] [address]) (9/8/2021):  Not detected             History of incision and drainage ICD-10-CM: Z98.890  ICD-9-CM: V45.89  9/4/2021 Yes    Overview Signed 9/20/2021 11:18 PM by Jonna Reddy MD     S/P Incision and drainage of right foot (9/4/2021 - Dr. Skyla Macdonald.)             * (Principal) Diabetic infection of right foot [facility]) ICD-10-CM: E11.628, L08.9  ICD-9-CM: 250.80, 686.9  9/2/2021 Yes Impaired mobility and ADLs ICD-10-CM: Z74.09, Z78.9  ICD-9-CM: V49.89  9/2/2021 Yes              Background:   Past Medical History:   Past Medical History:   Diagnosis Date    COVID-19 ruled out by laboratory testing 9/8/2021    SARS-CoV-2 ([facility]) (9/8/2021):  Not detected    Diabetic infection of right foot ([address] 9/2/2021    Essential hypertension     Hyperthyroidism     Type 2 diabetes mellitus without complication, with long-term current use of insulin ([facility])     HbA1c (9/3/2021) = 10.8    Vitamin D insufficiency 9/19/2021    Vitamin D 25-Hydroxy (9/19/2021) = 24.0        Assessment:   Changes in Assessment throughout shift: No change to previous assessment     Patient has a central line: no  Patient has Bal Cath: no        Last Vitals:     Vitals:    09/22/21 1546 09/22/21 2107 09/23/21 0811 09/23/21 1614   BP: (!) 146/83 (!) 147/87 (!) 151/89 (!) 142/84   Pulse: 90 92 95 91   Resp: 18 18 18 18   Temp: 98.5 °F (36.9 °C) 98 °F (36.7 °C) 98.7 °F (37.1 °C) 98.8 °F (37.1 °C)   SpO2: 99% 100% 99% 99%   Weight:       Height:            PAIN    Pain Assessment    Pain Intensity 1: 0 (09/23/21 1621) Pain Intensity 1: 2 (12/29/14 1105)    Pain Location 1: Foot Pain Location 1:      Pain Intervention(s) 1:  (medicated prior to dressing change) Pain Intervention(s) 1: Medication (see MAR)  Patient Stated Pain Goal: 0 Patient Stated Pain Goal: 0  o Intervention effective: no  o Other actions taken for pain:  no complaint of pain     Skin Assessment  Skin color Skin Color: Appropriate for ethnicity  Condition/Temperature Skin Condition/Temp: Warm, Dry  Integrity Skin Integrity: Wound (add Wound LDA)  Turgor Turgor: Non-tenting  Weekly Pressure Ulcer Documentation  Pressure  Injury Documentation: No Pressure Injury Noted-Pressure Ulcer Prevention Initiated  Wound Prevention & Protection Methods  Orientation of wound Orientation of Wound Prevention: Posterior  Location of Prevention Location of Wound Prevention: Buttocks, Heel, Sacrum/Coccyx  Dressing Present Dressing Present : No  Dressing Status    Wound Offloading Wound Offloading (Prevention Methods): Adaptive equipment, Bed, pressure reduction mattress, Chair cushion, Elevate heels, Pillows, Repositioning, Turning, [place], Wheelchair     INTAKE/OUPUT  Date 09/22/21 1900 - 09/23/21 0659 09/23/21 0700 - 09/24/21 0659   Shift 2751-6410 24 Hour Total 7475-8096 8443-8957 24 Hour Total   INTAKE   P.O.  720 714  714     P. O.  720 714  714   Shift Total(mL/kg)  720(7.5) 714(7.4)  714(7.4)   OUTPUT   Urine(mL/kg/hr)          Urine Occurrence(s) 2 x 4 x 1 x  1 x   Stool          Stool Occurrence(s) 0 x 0 x 1 x  1 x   Shift Total(mL/kg)        NET  720 714  714   Weight (kg) 96 96 96 96 96       Recommendations:  1. Patient needs and requests: Toileting for urination, encourage fluids    2. Pending tests/procedures: routine l;abs    Functional Level/Equipment: Partial (one person) W/C with assist x 1  Fall Precautions:   Fall risk precautions were reinforced with the patient; he was instructed to call for help prior to getting up. The following fall risk precautions were continued: bed/ chair alarms, door signage, yellow bracelet and socks as well as update of the [employer] tool in the patient's room. Willy Score: 3    HEALS Safety Check    A safety check occurred in the patient's room between off going nurse and oncoming nurse listed above.     The safety check included the below items  Area Items   H  High Alert Medications - Verify all high alert medication drips (heparin, PCA, etc.)   E  Equipment - Suction is set up for ALL patients (with yanker)  - Red plugs utilized for all equipment (IV pumps, etc.)  - WOWs wiped down at end of shift.  - Room stocked with oxygen, suction, and other unit-specific supplies   A  Alarms - Bed alarm is set for fall risk patients  - Ensure chair alarm is in place and activated if patient is up in a chair   L  Lines - Check IV for any infiltration  - Bal bag is empty if patient has a Bal   - Tubing and IV bags are labeled   S  Safety   - Room is clean, patient is clean, and equipment is clean. - Hallways are clear from equipment besides carts. - Fall bracelet on for fall risk patients  - Ensure room is clear and free of clutter  - Suction is set up for ALL patients (with darwinker)  - Hallways are clear from equipment besides carts.    - Isolation precautions followed, supplies available outside room, sign posted     Dasha Mcdermott RN

## 2021-09-23 NOTE — PROGRESS NOTES
Problem: Self Care Deficits Care Plan (Adult)  Goal: *Acute Goals and Plan of Care (Insert Text)  Description: Occupational Therapy Goals   Long Term Goals  Initiated 2021 and to be accomplished within 3 week(s), by 10/10/2021. 1. Pt will perform self-feeding with I.  2. Pt will perform grooming with I.  3. Pt will perform UB bathing with Mod I and use of AE PRN. 4. Pt will perform LB bathing with Mod I and use of AE PRN. 5. Pt will perform tub/shower transfer with supv and use of LRAD. 6. Pt will perform UB dressing with Mod I.  7. Pt will perform LB dressing with supv and use of AE PRN. 8. Pt will perform toileting task with Mod I.  9. Pt will perform toilet transfer with supv and use of LRAD. Short Term Goals   Initiated 2021 and to be accomplished within 7 day(s), by 2021. 1. Pt will perform self-feeding with Mod I.   2. Pt will perform grooming with set-up. 3. Pt will perform UB bathing with SBA and use of AE PRN. 4. Pt will perform LB bathing with Min A and use of AE PRN. 5. Pt will perform tub/shower transfer with Min A and use of LRAD. 6. Pt will perform UB dressing with SBA. 7. Pt will perform LB dressing with Mod A and use of AE PRN. 8. Pt will perform toileting task with Mod A.  9. Pt will perform toilet transfer with CGA and use of LRAD. Occupational Therapy TREATMENT    Patient: Chetan [place]   60 y.o. Patient identified with name and : Yes    Date: 2021    First Tx Session  Time In: 1100  Time Out[de-identified] 1200    Diagnosis: Diabetic foot infection ([facility] [E11.628, L08.9]   Precautions: Fall, NWB  Chart, occupational therapy assessment, plan of care, and goals were reviewed. Pain:  Pt reports 0/10 pain or discomfort prior to treatment. Pt reports 0/10 pain or discomfort post treatment. Intervention Provided: N/A      SUBJECTIVE:   Patient stated It was bad day.  referring to yesterday    OBJECTIVE DATA SUMMARY:     THERAPEUTIC ACTIVITY Daily Assessment \"Perfection\" in stand position to increase standing balance/tolerance for ADLs. THERAPEUTIC EXERCISE Daily Assessment    UB Bike up to 10 minutes with Max resistance to increase strength for ADLs. Chair raises 3x10 to increase strength for ADLs. Theraflexbar (red) supination x10, (green) supination 2x10, pronation 3x10, and wrist extension/flexion 3x10 to increase  strength for ADLs. \"Jenga\" and \"Checkers\" (with 2lb weight on non dominant hand) to increase strength and motor control for ADLs. Pt required slight verbal cues to problem solve task. MOBILITY/TRANSFERS Daily Assessment     Sit <> stand with S.       ASSESSMENT:  Pt working towards increasing BUE strength and balance for ADLs. Pt noted to require less vcs for functional transfers this session. Progression toward goals:  [x]          Improving appropriately and progressing toward goals  []          Improving slowly and progressing toward goals  []          Not making progress toward goals and plan of care will be adjusted     PLAN:  Patient continues to benefit from skilled intervention to address the above impairments. Continue treatment per established plan of care. Discharge Recommendations:  Home Health with 24 hr care  Further Equipment Recommendations for Discharge:  bedside commode      Activity Tolerance:  fair      Estimated LOS:    Please refer to the flowsheet for vital signs taken during this treatment. After treatment:   [x]  Patient left in no apparent distress sitting up in chair   []  Patient left in no apparent distress in bed  []  Call bell left within reach  []  Nursing notified  []  Caregiver present  []  Bed alarm activated    COMMUNICATION/EDUCATION:   [] Home safety education was provided and the patient/caregiver indicated understanding. [x] Patient/family have participated as able in goal setting and plan of care. [] Patient/family agree to work toward stated goals and plan of care.   [] Patient understands intent and goals of therapy, but is neutral about his/her participation. [] Patient is unable to participate in goal setting and plan of care.       Kavin Bazzi OT     Outcome: Progressing Towards Goal  Goal: Interventions  Outcome: Progressing Towards Goal

## 2021-09-23 NOTE — PROGRESS NOTES
Nutrition Education    · Verbally reviewed information with Patient and his wife  · Educated on diabetic diet/ CHO counting  · Written educational materials provided. · Contact number provided. · Refer to Patient Education activity for more details. Followed up with pt regarding diet education on diabetic diet. Briefly discussed CHO counting and meal planning in regard to CHO counting. Questions answered to their satisfaction. Plan to follow up next week for further discussion. Pt with good appetite/ meal intake. Tolerating diet.        Electronically signed by Teresa Manley RD on 9/23/2021 at 3:43 PM    Contact Number: [phone]

## 2021-09-24 LAB
GLUCOSE BLD STRIP.AUTO-MCNC: 137 MG/DL (ref 70–110)
GLUCOSE BLD STRIP.AUTO-MCNC: 157 MG/DL (ref 70–110)
GLUCOSE BLD STRIP.AUTO-MCNC: 183 MG/DL (ref 70–110)
GLUCOSE BLD STRIP.AUTO-MCNC: 191 MG/DL (ref 70–110)

## 2021-09-24 PROCEDURE — 97530 THERAPEUTIC ACTIVITIES: CPT

## 2021-09-24 PROCEDURE — 74011250637 HC RX REV CODE- 250/637: Performed by: EMERGENCY MEDICINE

## 2021-09-24 PROCEDURE — 99232 SBSQ HOSP IP/OBS MODERATE 35: CPT | Performed by: EMERGENCY MEDICINE

## 2021-09-24 PROCEDURE — 77030025414 HC DRSG VAC ASST SMPLC KCON -B

## 2021-09-24 PROCEDURE — 74011636637 HC RX REV CODE- 636/637: Performed by: INTERNAL MEDICINE

## 2021-09-24 PROCEDURE — 74011250637 HC RX REV CODE- 250/637: Performed by: INTERNAL MEDICINE

## 2021-09-24 PROCEDURE — 74011250636 HC RX REV CODE- 250/636: Performed by: INTERNAL MEDICINE

## 2021-09-24 PROCEDURE — 82962 GLUCOSE BLOOD TEST: CPT

## 2021-09-24 PROCEDURE — 97110 THERAPEUTIC EXERCISES: CPT

## 2021-09-24 PROCEDURE — 74011250637 HC RX REV CODE- 250/637: Performed by: FAMILY MEDICINE

## 2021-09-24 PROCEDURE — 97116 GAIT TRAINING THERAPY: CPT

## 2021-09-24 PROCEDURE — 65310000000 HC RM PRIVATE REHAB

## 2021-09-24 PROCEDURE — 2709999900 HC NON-CHARGEABLE SUPPLY

## 2021-09-24 RX ADMIN — POLYETHYLENE GLYCOL 3350 17 G: 17 POWDER, FOR SOLUTION ORAL at 08:50

## 2021-09-24 RX ADMIN — INSULIN GLARGINE 15 UNITS: 100 INJECTION, SOLUTION SUBCUTANEOUS at 21:45

## 2021-09-24 RX ADMIN — CEPHALEXIN 500 MG: 250 CAPSULE ORAL at 18:05

## 2021-09-24 RX ADMIN — HEPARIN SODIUM 5000 UNITS: 5000 INJECTION INTRAVENOUS; SUBCUTANEOUS at 10:41

## 2021-09-24 RX ADMIN — INSULIN LISPRO 2 UNITS: 100 INJECTION, SOLUTION INTRAVENOUS; SUBCUTANEOUS at 12:41

## 2021-09-24 RX ADMIN — HEPARIN SODIUM 5000 UNITS: 5000 INJECTION INTRAVENOUS; SUBCUTANEOUS at 01:57

## 2021-09-24 RX ADMIN — METHIMAZOLE 10 MG: 10 TABLET ORAL at 08:48

## 2021-09-24 RX ADMIN — HEPARIN SODIUM 5000 UNITS: 5000 INJECTION INTRAVENOUS; SUBCUTANEOUS at 18:05

## 2021-09-24 RX ADMIN — DOCUSATE SODIUM 100 MG: 100 CAPSULE, LIQUID FILLED ORAL at 18:05

## 2021-09-24 RX ADMIN — FAMOTIDINE 20 MG: 20 TABLET, FILM COATED ORAL at 18:05

## 2021-09-24 RX ADMIN — INSULIN LISPRO 2 UNITS: 100 INJECTION, SOLUTION INTRAVENOUS; SUBCUTANEOUS at 17:25

## 2021-09-24 RX ADMIN — LISINOPRIL 20 MG: 20 TABLET ORAL at 08:49

## 2021-09-24 RX ADMIN — Medication 5000 UNITS: at 08:49

## 2021-09-24 RX ADMIN — DOCUSATE SODIUM 100 MG: 100 CAPSULE, LIQUID FILLED ORAL at 08:49

## 2021-09-24 RX ADMIN — CEPHALEXIN 500 MG: 250 CAPSULE ORAL at 05:32

## 2021-09-24 RX ADMIN — FAMOTIDINE 20 MG: 20 TABLET, FILM COATED ORAL at 08:49

## 2021-09-24 RX ADMIN — METOPROLOL TARTRATE 25 MG: 25 TABLET, FILM COATED ORAL at 08:49

## 2021-09-24 RX ADMIN — CEPHALEXIN 500 MG: 250 CAPSULE ORAL at 12:40

## 2021-09-24 RX ADMIN — METOPROLOL TARTRATE 25 MG: 25 TABLET, FILM COATED ORAL at 21:45

## 2021-09-24 RX ADMIN — OXYCODONE HYDROCHLORIDE AND ACETAMINOPHEN 1 TABLET: 5; 325 TABLET ORAL at 03:35

## 2021-09-24 NOTE — PROGRESS NOTES
[address]  [address] [place] [address], [address]     INPATIENT REHABILITATION  DAILY PROGRESS NOTE     Date: 9/24/2021    Name: Alli Jensen Age / Sex: 60 y.o. / male   CSN: [id number] MRN: [id number]   [facility] Date: 9/18/2021 Length of Stay: 6 days     Primary Rehab Diagnosis: Impaired Mobility and ADLs secondary to:  1. Diabetic infection of right foot  2. S/P Incision and drainage, with decompression of multiple subfascial layers, right foot; Wound debridement, skin, soft tissue, and muscle, right foot (9/9/2021 - Dr. Kim Smith)  3. S/P Incision and drainage of right foot (9/4/2021 - Dr. Leo Joy.)      Subjective:     Patient is sitting in bed in no apparent distress, awake and alert.   Wife at bedside   Objective:     Vital Signs:  Patient Vitals for the past 24 hrs:   BP Temp Pulse Resp SpO2   09/24/21 1618 (!) 144/81 97.8 °F (36.6 °C) 89 18 99 %   09/24/21 0825 (!) 141/85 97.5 °F (36.4 °C) 87 19 99 %   09/23/21 2116 (!) 143/79 98 °F (36.7 °C) 94 20 99 %        Physical Examination:  General:  Awake, alert  Cardiovascular:  S1S2+, RRR  Pulmonary:  CTA b/l  GI:  Soft, BS+, NT, ND  Extremities:  No edema  Right foot with wound VAC             Current Medications:  Current Facility-Administered Medications   Medication Dose Route Frequency    polyethylene glycol (MIRALAX) packet 17 g  17 g Oral DAILY    insulin lispro (HUMALOG) injection   SubCUTAneous TIDAC    heparin (porcine) injection 5,000 Units  5,000 Units SubCUTAneous Q8H    insulin glargine (LANTUS) injection 15 Units  15 Units SubCUTAneous QHS    lisinopriL (PRINIVIL, ZESTRIL) tablet 20 mg  20 mg Oral DAILY    metoprolol tartrate (LOPRESSOR) tablet 25 mg  25 mg Oral Q12H    methIMAzole (TAPAZOLE) tablet 10 mg  10 mg Oral DAILY    oxyCODONE-acetaminophen (PERCOCET) 5-325 mg per tablet 1-2 Tablet  1-2 Tablet Oral Q6H PRN    acetaminophen (TYLENOL) tablet 650 mg  650 mg Oral Q4H PRN    cholecalciferol (VITAMIN D3) capsule 5,000 Units  5,000 Units Oral DAILY    famotidine (PEPCID) tablet 20 mg  20 mg Oral BID    docusate sodium (COLACE) capsule 100 mg  100 mg Oral BID    magnesium hydroxide (MILK OF MAGNESIA) 400 mg/5 mL oral suspension 30 mL  30 mL Oral DAILY PRN    bisacodyL (DULCOLAX) tablet 10 mg  10 mg Oral Q48H PRN    glucose chewable tablet 16 g  4 Tablet Oral PRN    glucagon (GLUCAGEN) injection 1 mg  1 mg IntraMUSCular PRN    dextrose (D50W) injection syrg 12.5-25 g  25-50 mL IntraVENous PRN    cephALEXin (KEFLEX) capsule 500 mg  500 mg Oral Q6H    hydrALAZINE (APRESOLINE) tablet 25 mg  25 mg Oral Q8H PRN       Allergies:  No Known Allergies    Lab/Data Review:  Recent Results (from the past 24 hour(s))   GLUCOSE, POC    Collection Time: 09/23/21  9:01 PM   Result Value Ref Range    Glucose (POC) 126 (H) 70 - 110 mg/dL   GLUCOSE, POC    Collection Time: 09/24/21  8:02 AM   Result Value Ref Range    Glucose (POC) 137 (H) 70 - 110 mg/dL   GLUCOSE, POC    Collection Time: 09/24/21 11:22 AM   Result Value Ref Range    Glucose (POC) 191 (H) 70 - 110 mg/dL   GLUCOSE, POC    Collection Time: 09/24/21  5:15 PM   Result Value Ref Range    Glucose (POC) 157 (H) 70 - 110 mg/dL       Assessment:     Primary Rehabilitation Diagnosis  1. Impaired Mobility and ADLs  2. Diabetic infection of right foot  2. S/P Incision and drainage, with decompression of multiple subfascial layers, right foot; Wound debridement, skin, soft tissue, and muscle, right foot (9/9/2021 - Dr. Praneeth Piper)  3.  S/P Incision and drainage of right foot (9/4/2021 - Dr. Mirian Hannon.)    Comorbidities  Patient Active Problem List   Diagnosis Code    Diabetic [facility] foot ([facility] E11.628, L08.9    Essential hypertension I10    Vitamin D insufficiency E55.9    Impaired mobility and ADLs Z74.09, Z78.9    Type 2 diabetes mellitus without complication, with long-term current use of insulin (HCC) E11.9, Z79.4  COVID-19 ruled out by laboratory testing Z20.822    Status post incision and drainage Z98.890    History of incision and drainage Z98.890    Hyperthyroidism E05.90       Plan:     1. Justification for continued stay: Good progression towards established rehabilitation goals. 2. Medical Issues being followed closely:    [x]  Fall and safety precautions     [x]  Wound Care     [x]  Bowel and Bladder Function     [x]  Fluid Electrolyte and Nutrition Balance     [x]  Pain Control      3. Issues that 24 hour rehabilitation nursing is following:    [x]  Fall and safety precautions     [x]  Wound Care     [x]  Bowel and Bladder Function     [x]  Fluid Electrolyte and Nutrition Balance     [x]  Pain Control      [x]  Assistance with and education on in-room safety with transfers to and from the bed, wheelchair, toilet and shower. 4. Acute rehabilitation plan of care:    [x]  Continue current care and rehab. [x]  Physical Therapy           [x]  Occupational Therapy           []  Speech Therapy     []  Hold Rehab until further notice     5. Medications:    [x]  MAR Reviewed     [x]  Continue Present Medications     6. DVT Prophylaxis:      []  Enoxaparin     [x]  Unfractionated Heparin     []  Warfarin     []  NOAC     []  WYATT Stockings     []  Sequential Compression Device     []  None     7. Code status    [x]  Full code     []  Partial code     []  Do not intubate     []  Do not resuscitate     8. Orders:   > Diabetic infection of right foot; S/P Incision and drainage, with decompression of multiple subfascial layers, right foot; Wound debridement, skin, soft tissue, and muscle, right foot (9/9/2021 - Dr. Chilango Olivares); S/P Incision and drainage of right foot (9/4/2021 - Dr. Indra Castillo.)   > Nonweightbearing on the right foot   > Wound care nurse consult for WoundVac management   > Wound care instructions: Staff nurse to perform wound vac dressing changes Monday, Wednesday and fridays. Remove old dressing from right plantar foot wound and clean site  with wound spray. Apply skin prep  followed barrier ring then drapes. Cut hole over wound, apply adaptic  to wound bed then pack wound with black foam. Applied drapes  followed by diskus.  Settings 125mmhg continuous.    > On Keflex through September 28    > Essential hypertension   > Metoprolol and lisinopril, hydralazine    > Hyperthyroidism   > Methimazole    > Type 2 diabetes mellitus without complication, with long-term current use of insulin   > Lantus, sliding scale    > Vitamin D insufficiency   Supplement    > Analgesia   > Tylenol, Percocet as needed    > Diet:   > Diabetic  Bowel regimen    9. Personal Protective Equipment (SR85 face mask) was used while interacting with the patient. Patient was using a surgical mask.     Discussed with patient and wife      Signed:    Huseyin Donato MD      September 24, 2021

## 2021-09-24 NOTE — REHAB NOTE
Family education done. Discussed about current medications and wound vac therapy at home. Opportunity for questions and clarification was provided. Patient and spouse verbalized understanding.

## 2021-09-24 NOTE — PROGRESS NOTES
Problem: Self Care Deficits Care Plan (Adult)  Goal: *Acute Goals and Plan of Care (Insert Text)  Description: Occupational Therapy Goals   Long Term Goals  Initiated 2021 and to be accomplished within 3 week(s), by 10/10/2021. 1. Pt will perform self-feeding with I.  2. Pt will perform grooming with I.  3. Pt will perform UB bathing with Mod I and use of AE PRN. 4. Pt will perform LB bathing with Mod I and use of AE PRN. 5. Pt will perform tub/shower transfer with supv and use of LRAD. 6. Pt will perform UB dressing with Mod I.  7. Pt will perform LB dressing with supv and use of AE PRN. 8. Pt will perform toileting task with Mod I.  9. Pt will perform toilet transfer with supv and use of LRAD. Short Term Goals   Initiated 2021 and to be accomplished within 7 day(s), by 2021. 1. Pt will perform self-feeding with Mod I.   2. Pt will perform grooming with set-up. 3. Pt will perform UB bathing with SBA and use of AE PRN. 4. Pt will perform LB bathing with Min A and use of AE PRN. 5. Pt will perform tub/shower transfer with Min A and use of LRAD. 6. Pt will perform UB dressing with SBA. 7. Pt will perform LB dressing with Mod A and use of AE PRN. 8. Pt will perform toileting task with Mod A.  9. Pt will perform toilet transfer with CGA and use of LRAD. Occupational Therapy TREATMENT    Patient: Emmanuel Mcmillan   60 y.o. Patient identified with name and : Yes    Date: 2021    First Tx Session  Time In: 0900  Time Out[de-identified] 1000    Second Tx Session  Time In: 1400  Time Out[de-identified] 1430    Diagnosis: Diabetic foot infection ([facility] [address] [E11.628, L08.9]   Precautions: Fall, NWB  Chart, occupational therapy assessment, plan of care, and goals were reviewed. Pain:  Pt reports 0/10 pain or discomfort prior to treatment. Pt reports 0/10 pain or discomfort post treatment.    Intervention Provided: N/A      SUBJECTIVE:   Patient stated How long is it going to take to heal?    OBJECTIVE DATA SUMMARY:     THERAPEUTIC ACTIVITY Daily Assessment        Family Education with wife and son. Bedside Commode transfers demonstrated to family with CGA. Son showed decreased return demonstration requiring verbal,  visual cues, and re education on proper asst patient with transfers. Pt showed fair carryover of re education. Pt son noted to be distracted by cell phone. Pt wife had questions regarding discharge/wound vac and encouraged to ask Nursing during [employer]. Nursing made aware. THERAPEUTIC EXERCISE Daily Assessment    To increase BUE strength for ADLs: UB bike with Max resistance and Chair raises 2x12 and 1x20    Balloon Volley with elbows up 2x50     HEP: Instruct pt. in home exercise program: UB HEP ( chest pulls, butterfly wings, front raise,upright rows, overhead press, tricep extension) 2x15. Handout to  Be given next session. MOBILITY/TRANSFERS Daily Assessment     Sit to stand <>CGA       ASSESSMENT:  Pt showing increased BUE strength and balance for ADLs. Progression toward goals:  [x]          Improving appropriately and progressing toward goals  []          Improving slowly and progressing toward goals  []          Not making progress toward goals and plan of care will be adjusted     PLAN:  Patient continues to benefit from skilled intervention to address the above impairments. Continue treatment per established plan of care. Discharge Recommendations:  Home Health with asst  Further Equipment Recommendations for Discharge:  bedside commode      Activity Tolerance:  Fair       Estimated LOS:    Please refer to the flowsheet for vital signs taken during this treatment.   After treatment:   [x]  Patient left in no apparent distress sitting up in chair   []  Patient left in no apparent distress in bed  []  Call bell left within reach  []  Nursing notified  []  Caregiver present  []  Bed alarm activated    COMMUNICATION/EDUCATION:   [] Home safety education was provided and the patient/caregiver indicated understanding. [x] Patient/family have participated as able in goal setting and plan of care. [] Patient/family agree to work toward stated goals and plan of care. [] Patient understands intent and goals of therapy, but is neutral about his/her participation. [] Patient is unable to participate in goal setting and plan of care.       Kavin Bazzi OT   Outcome: Progressing Towards Goal  Goal: Interventions  Outcome: Progressing Towards Goal

## 2021-09-24 NOTE — ROUTINE PROCESS
SHIFT CHANGE NOTE FOR [facility]    Bedside and Verbal shift change report given to Buck Devlin RN (oncoming nurse) by Olvin Verde RN (offgoing nurse). Report included the following information SBAR, Kardex, MAR and Recent Results. Situation:   Code Status: Full Code   Hospital Day: 6   Problem List:   Hospital Problems  Date Reviewed: 6/10/2021        Codes Class Noted POA    Essential hypertension (Chronic) ICD-10-CM: I10  ICD-9-CM: 401.9  Unknown Yes        Type 2 diabetes mellitus without complication, with long-term current use of insulin (HCC) (Chronic) ICD-10-CM: E11.9, Z79.4  ICD-9-CM: 250.00, V58.67  Unknown Yes    Overview Signed 9/20/2021 11:13 PM by Ephriam Ahumada, MD     HbA1c (9/3/2021) = 10.8             Hyperthyroidism (Chronic) ICD-10-CM: E05.90  ICD-9-CM: 242.90  Unknown Yes        Vitamin D insufficiency (Chronic) ICD-10-CM: E55.9  ICD-9-CM: 268.9  9/19/2021 Yes    Overview Signed 9/20/2021 11:11 PM by Ephriam Ahumada, MD     Vitamin D 25-Hydroxy (9/19/2021) = 24.0             Status post incision and drainage ICD-10-CM: Z98.890  ICD-9-CM: V45.89  9/9/2021 Yes    Overview Signed 9/20/2021 11:18 PM by Ephriam Ahumada, MD     S/P Incision and drainage, with decompression of multiple subfascial layers, right foot; Wound debridement, skin, soft tissue, and muscle, right foot (9/9/2021 - Dr. Za Pompa)             ESFKH-90 ruled out by laboratory testing ICD-10-CM: Z20.822  ICD-9-CM: V01.79  9/8/2021 Yes    Overview Signed 9/20/2021 11:14 PM by Ephriam Ahumada, MD     SARS-CoV-2 ([address] [place] [address]) (9/8/2021):  Not detected             History of incision and drainage ICD-10-CM: Z98.890  ICD-9-CM: V45.89  9/4/2021 Yes    Overview Signed 9/20/2021 11:18 PM by Ephriam Ahumada, MD     S/P Incision and drainage of right foot (9/4/2021 - Dr. Av Cooper.)             * (Principal) Diabetic infection of right foot [facility]) ICD-10-CM: E11.628, L08.9  ICD-9-CM: 250.80, 686.9  9/2/2021 Yes Impaired mobility and ADLs ICD-10-CM: Z74.09, Z78.9  ICD-9-CM: V49.89  9/2/2021 Yes              Background:   Past Medical History:   Past Medical History:   Diagnosis Date    COVID-19 ruled out by laboratory testing 9/8/2021    SARS-CoV-2 ([facility]) (9/8/2021):  Not detected    Diabetic infection of right foot ([address] 9/2/2021    Essential hypertension     Hyperthyroidism     Type 2 diabetes mellitus without complication, with long-term current use of insulin ([facility])     HbA1c (9/3/2021) = 10.8    Vitamin D insufficiency 9/19/2021    Vitamin D 25-Hydroxy (9/19/2021) = 24.0        Assessment:   Changes in Assessment throughout shift: No change to previous assessment     Patient has a central line: no  Patient has Bal Cath: no        Last Vitals:     Vitals:    09/23/21 1614 09/23/21 2116 09/24/21 0825 09/24/21 1618   BP: (!) 142/84 (!) 143/79 (!) 141/85 (!) 144/81   Pulse: 91 94 87 89   Resp: 18 20 19 18   Temp: 98.8 °F (37.1 °C) 98 °F (36.7 °C) 97.5 °F (36.4 °C) 97.8 °F (36.6 °C)   SpO2: 99% 99% 99% 99%   Weight:       Height:            PAIN    Pain Assessment    Pain Intensity 1: 0 (09/24/21 1633) Pain Intensity 1: 2 (12/29/14 1105)    Pain Location 1: Foot Pain Location 1:      Pain Intervention(s) 1:  (medicated for dressing change) Pain Intervention(s) 1: Medication (see MAR)  Patient Stated Pain Goal: 0 Patient Stated Pain Goal: 0  o Intervention effective: no  o Other actions taken for pain:  no complaint of pain     Skin Assessment  Skin color Skin Color: Appropriate for ethnicity  Condition/Temperature Skin Condition/Temp: Dry, Warm  Integrity Skin Integrity: Intact, Wound (add Wound LDA)  Turgor Turgor: Non-tenting  Weekly Pressure Ulcer Documentation  Pressure  Injury Documentation: No Pressure Injury Noted-Pressure Ulcer Prevention Initiated  Wound Prevention & Protection Methods  Orientation of wound Orientation of Wound Prevention: Posterior  Location of Prevention Location of Wound Prevention: Sacrum/Coccyx  Dressing Present Dressing Present : No  Dressing Status    Wound Offloading Wound Offloading (Prevention Methods): Bed, pressure reduction mattress     INTAKE/OUPUT  Date 09/23/21 0700 - 09/24/21 0659 09/24/21 0700 - 09/25/21 0659   Shift [id number] [id number] 24 Hour Total 5943-1699 3174-5875 24 Hour Total   INTAKE   P.O. 714  714 474  474     P. O. 714  714 474  474   Shift Total(mL/kg) 714(7.4)  714(7.4) 474(4.9)  474(4.9)   OUTPUT   Urine(mL/kg/hr)           Urine Occurrence(s) 1 x 2 x 3 x 1 x  1 x   Stool           Stool Occurrence(s) 1 x 0 x 1 x 1 x  1 x   Shift Total(mL/kg)           714 474  356   Weight (kg) 96 96 96 96 96 96       Recommendations:  1. Patient needs and requests: Toileting for urination, encourage fluids    2. Pending tests/procedures: routine l;abs    Functional Level/Equipment: Complete care W/C with assist x 1  Fall Precautions:   Fall risk precautions were reinforced with the patient; he was instructed to call for help prior to getting up. The following fall risk precautions were continued: bed/ chair alarms, door signage, yellow bracelet and socks as well as update of the Zeyad Pollack tool in the patient's room. Willy Score: 3    HEALS Safety Check    A safety check occurred in the patient's room between off going nurse and oncoming nurse listed above.     The safety check included the below items  Area Items   H  High Alert Medications - Verify all high alert medication drips (heparin, PCA, etc.)   E  Equipment - Suction is set up for ALL patients (with yanker)  - Red plugs utilized for all equipment (IV pumps, etc.)  - WOWs wiped down at end of shift.  - Room stocked with oxygen, suction, and other unit-specific supplies   A  Alarms - Bed alarm is set for fall risk patients  - Ensure chair alarm is in place and activated if patient is up in a chair   L  Lines - Check IV for any infiltration  - Bal bag is empty if patient has a Bal   - Tubing and IV bags are labeled   S  Safety   - Room is clean, patient is clean, and equipment is clean. - Hallways are clear from equipment besides carts. - Fall bracelet on for fall risk patients  - Ensure room is clear and free of clutter  - Suction is set up for ALL patients (with roman)  - Hallways are clear from equipment besides carts.    - Isolation precautions followed, supplies available outside room, sign posted     Verner Schaffer, RN

## 2021-09-24 NOTE — PROGRESS NOTES
Problem: Mobility Impaired (Adult and Pediatric)  Goal: *Therapy Goal (Edit Goal, Insert Text)  Description: Physical Therapy Short Term Goals  Initiated 9/20/2021 and to be accomplished within 7 day(s) [9/27/21]  1. Patient will move from supine to sit and sit to supine  and roll side to side in bed with modified independence. 2.  Patient will transfer from bed to chair and chair to bed with Stand-by assistance using the least restrictive device, NWB R LE.  3.  Patient will perform sit to stand with Stand-by assistance to RW and NWB R LE.  4.  Patient will ambulate with contact guard assist for 100 feet with RW, NWB R LE and hop method. 5.  Patient will propel w/c 150 ft with supervision on level surfaces using B UE's and L LE. Physical Therapy Long Term Goals  Initiated 9/20/2021 and to be accomplished within 21 day(s) [10/11/21]  1. Patient will move from supine to sit and sit to supine , scoot up and down, and roll side to side in bed with independence. 2.  Patient will transfer from bed to chair and chair to bed with modified independence using the least restrictive device. 3.  Patient will perform sit to stand with modified independence and RW. 4.  Patient will ambulate with supervision for 150 feet with RW, NWB R LE, and hop method. 5.  Patient will ascend/descend 4 stairs with one handrail(s) with minimal assistance/contact guard assist.     Outcome: Progressing Towards Goal       PHYSICAL THERAPY TREATMENT    Patient: Mary Garcia (24 y.o. male)  Date: 9/24/2021  Diagnosis: Diabetic foot infection ([facility] [address] [E11.628, L08.9] Diabetic infection of right foot ([address]  Precautions: Fall, NWB  Chart, physical therapy assessment, plan of care and goals were reviewed. Time In:1041  Time Out:1211    Patient seen for: Balance activities;Gait training;Patient education; Family training;Transfer training; Wheelchair mobility    Pain:  Pt pain was reported as no c/o pain pre-treatment.   Pt pain was reported as no c/o pain post-treatment. Intervention: N/A    Patient identified with name and : yes    SUBJECTIVE:      Pt reports, \"that wasn't as difficult as I thought, but I don't like those,\" re: participating in stair negotiation training on 4\" practice steps. OBJECTIVE DATA SUMMARY:    Objective:     Pt's wife and son present at start of treatment for family education opportunity. Pt's wife participated in discussion re: maintaining a safe home environment upon d/c and pt and his wife verbalized understanding of recommendations re: follow up care and need for 24 hour supervision/assistance upon d/c.  Pt's wife also verbalized understanding of PT education provided re: maintaining patient safety utilizing gait belt and appropriate guarding techniques. BED/MAT MOBILITY Daily Assessment     Supine to Sit : 6 (Modified independent) with increased time and attention to wound vac line. TRANSFERS Daily Assessment     Transfer Type: Other  Other: stand step with RW  Transfer Assistance : 4 (Contact guard assistance)  Sit to Stand Assistance: Contact guard assistance   Pt requires close supervision to CGA for balance and safety with functional transfers with no verbal cues for safe hand placement. GAIT Daily Assessment    Gait Description (WDL) Exceptions to WDL    Gait Abnormalities Decreased left foot clearance    Assistive device Walker, rolling    Ambulation assistance - level surface 4 (Contact guard assistance)    Distance 54 Feet (ft), 10 Feet x 2 trials    Ambulation assistance- uneven surface  NT    Comments Pt ambulates with slow, deliberate pace with forward flexed posture and decreased left foot clearance maintaining right LE NWB.         STEPS/STAIRS Daily Assessment     Steps/Stairs ambulated 3 (4\")    Assistance Required 3 (Moderate assistance)    Rail Use Both    Comments   Pt negotiates stairs maintaining right LE NWB with minimal to moderate steadying assistance from PT for balance with pt demonstrating inconsistent safety with left foot placement on the step. Curbs/Ramps NT        BALANCE Daily Assessment     Sitting - Static: Good (unsupported)  Sitting - Dynamic: Good (unsupported)  Standing - Static: Fair  Standing - Dynamic : Impaired        WHEELCHAIR MOBILITY Daily Assessment     Functional Level: 6 (over level surfaces)        THERAPEUTIC EXERCISES Daily Assessment     Seated B LE Strength Training:  3 Sets of 10 Repetitions  2# Right and Left LAQ  2# Alternate Hip flexion        ASSESSMENT:  Pt is progressing well participating in stair negotiation training this treatment session. However, pt reports he feels he would prefer to look into a ramp rental for safe home entry upon d/c. Pt provided with information re: ramp rentals and instructions and specifications should he know someone who could help build a ramp for him. Progression toward goals:  [x]      Improving appropriately and progressing toward goals  []      Improving slowly and progressing toward goals  []      Not making progress toward goals and plan of care will be adjusted      PLAN:  Patient continues to benefit from skilled intervention to address the above impairments. Continue treatment per established plan of care. Emphasize functional strengthening to promote improved safety and independence with mobility. Discharge Recommendations:  Home Health Physical Therapy with 24 hour assistance  Further Equipment Recommendations for Discharge:  rolling walker and wheelchair 18 inch with gel cushion, removable and elevating leg rests, removable arm rests, anti-tippers and brake extenders      Estimated Discharge Date: 10/08/2021    Activity Tolerance:   Good  Please refer to the flowsheet for vital signs taken during this treatment.     After treatment:   [] Patient left in no apparent distress in bed  [x] Patient left in no apparent distress sitting up in chair  [] Patient left in no apparent distress in w/c mobilizing under own power  [] Patient left in no apparent distress dining area  [] Patient left in no apparent distress mobilizing under own power  [x] Call bell left within reach  [] Nursing notified  [] Caregiver present  [] Bed alarm activated   [x] Chair alarm activated      Cecily Tucker, PT, DPT  9/24/2021

## 2021-09-24 NOTE — ROUTINE PROCESS
SHIFT CHANGE NOTE FOR [facility]    Bedside and Verbal shift change report given to TAMEKA Shrestha (oncoming nurse) by Bc Monet RN (offgoing nurse). Report included the following information SBAR, Kardex, MAR and Recent Results. Situation:   Code Status: Full Code   Hospital Day: 6   Problem List:   Hospital Problems  Date Reviewed: 6/10/2021        Codes Class Noted POA    Essential hypertension (Chronic) ICD-10-CM: I10  ICD-9-CM: 401.9  Unknown Yes        Type 2 diabetes mellitus without complication, with long-term current use of insulin (HCC) (Chronic) ICD-10-CM: E11.9, Z79.4  ICD-9-CM: 250.00, V58.67  Unknown Yes    Overview Signed 9/20/2021 11:13 PM by Ad Galo MD     HbA1c (9/3/2021) = 10.8             Hyperthyroidism (Chronic) ICD-10-CM: E05.90  ICD-9-CM: 242.90  Unknown Yes        Vitamin D insufficiency (Chronic) ICD-10-CM: E55.9  ICD-9-CM: 268.9  9/19/2021 Yes    Overview Signed 9/20/2021 11:11 PM by Ad Galo MD     Vitamin D 25-Hydroxy (9/19/2021) = 24.0             Status post incision and drainage ICD-10-CM: Z98.890  ICD-9-CM: V45.89  9/9/2021 Yes    Overview Signed 9/20/2021 11:18 PM by Ad Galo MD     S/P Incision and drainage, with decompression of multiple subfascial layers, right foot; Wound debridement, skin, soft tissue, and muscle, right foot (9/9/2021 - Dr. Chantell Ramey)             KTCPS-20 ruled out by laboratory testing ICD-10-CM: Z20.822  ICD-9-CM: V01.79  9/8/2021 Yes    Overview Signed 9/20/2021 11:14 PM by Ad Galo MD     SARS-CoV-2 (Maribell [facility], [facility]) (9/8/2021):  Not detected             History of incision and drainage ICD-10-CM: Z98.890  ICD-9-CM: V45.89  9/4/2021 Yes    Overview Signed 9/20/2021 11:18 PM by Ad Galo MD     S/P Incision and drainage of right foot (9/4/2021 - Dr. Ivon Aguilar.)             * (Principal) Diabetic infection of right foot [facility]) ICD-10-CM: E11.628, L08.9  ICD-9-CM: 250.80, 686.9  9/2/2021 Yes Impaired mobility and ADLs ICD-10-CM: Z74.09, Z78.9  ICD-9-CM: V49.89  9/2/2021 Yes              Background:   Past Medical History:   Past Medical History:   Diagnosis Date    COVID-19 ruled out by laboratory testing 9/8/2021    SARS-CoV-2 ([facility]) (9/8/2021):  Not detected    Diabetic infection of right foot ([address] 9/2/2021    Essential hypertension     Hyperthyroidism     Type 2 diabetes mellitus without complication, with long-term current use of insulin ([facility])     HbA1c (9/3/2021) = 10.8    Vitamin D insufficiency 9/19/2021    Vitamin D 25-Hydroxy (9/19/2021) = 24.0        Assessment:   Changes in Assessment throughout shift: No change to previous assessment     Patient has a central line: no  Patient has Bal Cath: no        Last Vitals:     Vitals:    09/22/21 2107 09/23/21 0811 09/23/21 1614 09/23/21 2116   BP: (!) 147/87 (!) 151/89 (!) 142/84 (!) 143/79   Pulse: 92 95 91 94   Resp: 18 18 18 20   Temp: 98 °F (36.7 °C) 98.7 °F (37.1 °C) 98.8 °F (37.1 °C) 98 °F (36.7 °C)   SpO2: 100% 99% 99% 99%   Weight:       Height:            PAIN    Pain Assessment    Pain Intensity 1: 0 (09/24/21 0441) Pain Intensity 1: 2 (12/29/14 1105)    Pain Location 1: Foot Pain Location 1:      Pain Intervention(s) 1:  (medicated for dressing change) Pain Intervention(s) 1: Medication (see MAR)  Patient Stated Pain Goal: 0 Patient Stated Pain Goal: 0  o Intervention effective: no  o Other actions taken for pain:  (medicated for dressing change)no complaint of pain     Skin Assessment  Skin color Skin Color: Appropriate for ethnicity  Condition/Temperature Skin Condition/Temp: Dry, Warm  Integrity Skin Integrity: Wound (add Wound LDA)  Turgor Turgor: Non-tenting  Weekly Pressure Ulcer Documentation  Pressure  Injury Documentation: No Pressure Injury Noted-Pressure Ulcer Prevention Initiated  Wound Prevention & Protection Methods  Orientation of wound Orientation of Wound Prevention: Posterior  Location of Prevention Location of Wound Prevention: Buttocks, Sacrum/Coccyx  Dressing Present Dressing Present : No  Dressing Status    Wound Offloading Wound Offloading (Prevention Methods): Bed, pressure redistribution/air     INTAKE/OUPUT  Date 09/23/21 0700 - 09/24/21 0659 09/24/21 0700 - 09/25/21 0659   Shift 8078-7726 5485-4096 24 Hour Total 5575-8053 4105-4985 24 Hour Total   INTAKE   P.O. 714  714        P. O. 714  714      Shift Total(mL/kg) 714(7.4)  714(7.4)      OUTPUT   Urine(mL/kg/hr)           Urine Occurrence(s) 1 x 2 x 3 x      Stool           Stool Occurrence(s) 1 x 0 x 1 x      Shift Total(mL/kg)           714      Weight (kg) 96 96 96 96 96 96       Recommendations:  1. Patient needs and requests: Toileting for urination, encourage fluids    2. Pending tests/procedures:no labs    Functional Level/Equipment: Complete care W/C with assist x 1  Fall Precautions:   Fall risk precautions were reinforced with the patient; he was instructed to call for help prior to getting up. The following fall risk precautions were continued: bed/ chair alarms, door signage, yellow bracelet and socks as well as update of the Valeri Malagon tool in the patient's room. Willy Score: 3    HEALS Safety Check    A safety check occurred in the patient's room between off going nurse and oncoming nurse listed above.     The safety check included the below items  Area Items   H  High Alert Medications - Verify all high alert medication drips (heparin, PCA, etc.)   E  Equipment - Suction is set up for ALL patients (with yanker)  - Red plugs utilized for all equipment (IV pumps, etc.)  - WOWs wiped down at end of shift.  - Room stocked with oxygen, suction, and other unit-specific supplies   A  Alarms - Bed alarm is set for fall risk patients  - Ensure chair alarm is in place and activated if patient is up in a chair   L  Lines - Check IV for any infiltration  - Bal bag is empty if patient has a Bal   - Tubing and IV bags are labeled S  Safety   - Room is clean, patient is clean, and equipment is clean. - Hallways are clear from equipment besides carts. - Fall bracelet on for fall risk patients  - Ensure room is clear and free of clutter  - Suction is set up for ALL patients (with yanker)  - Hallways are clear from equipment besides carts.    - Isolation precautions followed, supplies available outside room, sign posted     Leatha Brunner RN

## 2021-09-25 LAB
GLUCOSE BLD STRIP.AUTO-MCNC: 204 MG/DL (ref 70–110)
GLUCOSE BLD STRIP.AUTO-MCNC: 232 MG/DL (ref 70–110)
GLUCOSE BLD STRIP.AUTO-MCNC: 260 MG/DL (ref 70–110)
GLUCOSE BLD STRIP.AUTO-MCNC: 91 MG/DL (ref 70–110)

## 2021-09-25 PROCEDURE — 74011250637 HC RX REV CODE- 250/637: Performed by: INTERNAL MEDICINE

## 2021-09-25 PROCEDURE — 97110 THERAPEUTIC EXERCISES: CPT

## 2021-09-25 PROCEDURE — 65310000000 HC RM PRIVATE REHAB

## 2021-09-25 PROCEDURE — 74011250637 HC RX REV CODE- 250/637: Performed by: EMERGENCY MEDICINE

## 2021-09-25 PROCEDURE — 74011250636 HC RX REV CODE- 250/636: Performed by: INTERNAL MEDICINE

## 2021-09-25 PROCEDURE — 82962 GLUCOSE BLOOD TEST: CPT

## 2021-09-25 PROCEDURE — 74011636637 HC RX REV CODE- 636/637: Performed by: INTERNAL MEDICINE

## 2021-09-25 PROCEDURE — 74011636637 HC RX REV CODE- 636/637: Performed by: FAMILY MEDICINE

## 2021-09-25 PROCEDURE — 74011250637 HC RX REV CODE- 250/637: Performed by: FAMILY MEDICINE

## 2021-09-25 PROCEDURE — 97535 SELF CARE MNGMENT TRAINING: CPT

## 2021-09-25 RX ORDER — METOPROLOL TARTRATE 50 MG/1
50 TABLET ORAL EVERY 12 HOURS
Status: DISCONTINUED | OUTPATIENT
Start: 2021-09-25 | End: 2021-10-08 | Stop reason: HOSPADM

## 2021-09-25 RX ORDER — INSULIN GLARGINE 100 [IU]/ML
18 INJECTION, SOLUTION SUBCUTANEOUS
Status: DISCONTINUED | OUTPATIENT
Start: 2021-09-25 | End: 2021-09-28

## 2021-09-25 RX ADMIN — HEPARIN SODIUM 5000 UNITS: 5000 INJECTION INTRAVENOUS; SUBCUTANEOUS at 17:13

## 2021-09-25 RX ADMIN — FAMOTIDINE 20 MG: 20 TABLET, FILM COATED ORAL at 17:13

## 2021-09-25 RX ADMIN — FAMOTIDINE 20 MG: 20 TABLET, FILM COATED ORAL at 09:08

## 2021-09-25 RX ADMIN — CEPHALEXIN 500 MG: 250 CAPSULE ORAL at 00:01

## 2021-09-25 RX ADMIN — CEPHALEXIN 500 MG: 250 CAPSULE ORAL at 05:59

## 2021-09-25 RX ADMIN — CEPHALEXIN 500 MG: 250 CAPSULE ORAL at 12:05

## 2021-09-25 RX ADMIN — INSULIN LISPRO 4 UNITS: 100 INJECTION, SOLUTION INTRAVENOUS; SUBCUTANEOUS at 12:41

## 2021-09-25 RX ADMIN — METHIMAZOLE 10 MG: 10 TABLET ORAL at 09:08

## 2021-09-25 RX ADMIN — LISINOPRIL 20 MG: 20 TABLET ORAL at 09:08

## 2021-09-25 RX ADMIN — INSULIN GLARGINE 18 UNITS: 100 INJECTION, SOLUTION SUBCUTANEOUS at 21:55

## 2021-09-25 RX ADMIN — CEPHALEXIN 500 MG: 250 CAPSULE ORAL at 17:13

## 2021-09-25 RX ADMIN — HEPARIN SODIUM 5000 UNITS: 5000 INJECTION INTRAVENOUS; SUBCUTANEOUS at 09:08

## 2021-09-25 RX ADMIN — DOCUSATE SODIUM 100 MG: 100 CAPSULE, LIQUID FILLED ORAL at 09:08

## 2021-09-25 RX ADMIN — DOCUSATE SODIUM 100 MG: 100 CAPSULE, LIQUID FILLED ORAL at 17:13

## 2021-09-25 RX ADMIN — INSULIN LISPRO 4 UNITS: 100 INJECTION, SOLUTION INTRAVENOUS; SUBCUTANEOUS at 09:09

## 2021-09-25 RX ADMIN — Medication 5000 UNITS: at 09:08

## 2021-09-25 RX ADMIN — POLYETHYLENE GLYCOL 3350 17 G: 17 POWDER, FOR SOLUTION ORAL at 09:08

## 2021-09-25 RX ADMIN — METOPROLOL TARTRATE 50 MG: 50 TABLET, FILM COATED ORAL at 21:56

## 2021-09-25 RX ADMIN — METOPROLOL TARTRATE 25 MG: 25 TABLET, FILM COATED ORAL at 09:08

## 2021-09-25 RX ADMIN — HEPARIN SODIUM 5000 UNITS: 5000 INJECTION INTRAVENOUS; SUBCUTANEOUS at 02:37

## 2021-09-25 NOTE — PROGRESS NOTES
Progress Note    Patient: Lenin Pink MRN: [id number]  CSN: [id number]    YOB: 1965  Age: 60 y.o. Sex: male    DOA: 9/18/2021 LOS:  LOS: 7 days                    Subjective:     Primary Rehab Diagnosis: Impaired Mobility and ADLs secondary to:  1. Diabetic infection of right foot  2. S/P Incision and drainage, with decompression of multiple subfascial layers, right foot; Wound debridement, skin, soft tissue, and muscle, right foot (9/9/2021 - Dr. Aguila Cortes)  3. S/P Incision and drainage of right foot (9/4/2021 - Dr. Chava Solo.)    No acute patient concerns today      Review of systems  General: No fevers or chills. Cardiovascular: No chest pain or pressure. No palpitations. Pulmonary: No shortness of breath, cough or wheeze. Gastrointestinal: No abdominal pain, nausea, vomiting or diarrhea. Genitourinary: No urinary frequency, urgency, hesitancy or dysuria. Musculoskeletal: pain fairly controlled on current regimen. Neurologic: No headache    Objective:     Physical Exam:  Visit Vitals  BP (!) 157/83 (BP 1 Location: Right upper arm, BP Patient Position: At rest)   Pulse 96   Temp 98.7 °F (37.1 °C)   Resp 18   Ht 5' 11\" (1.803 m)   Wt 96 kg (211 lb 9.6 oz)   SpO2 100%   BMI 29.51 kg/m²        General:         Alert, cooperative, no acute distress    HEENT: NC, Atraumatic. PERRLA, anicteric sclerae. Lungs: CTA Bilaterally. No Wheezing/Rhonchi/Rales. Heart:  Regular  rhythm,  No murmur, No Rubs, No Gallops  Abdomen: Soft, Non distended, Non tender.  +Bowel sounds, no HSM  Extremities: Trace bilateral edema, right lower extremity wound vac in place  Psych:   Not anxious or agitated. Neurologic:  CN 2-12 grossly intact, Alert and oriented X 3. No acute neurological deficit. Intake and Output:  Current Shift:  No intake/output data recorded.   Last three shifts:  09/23 1901 - 09/25 0700  In: 474 [P.O.:474]  Out: -     Labs: Results:       Chemistry No results for input(s): GLU, NA, K, CL, CO2, BUN, CREA, CA, AGAP, BUCR, TBIL, AP, TP, ALB, GLOB, AGRAT in the last 72 hours. No lab exists for component: GPT   CBC w/Diff Recent Labs     09/23/21  0613   HGB 10.0*   HCT 30.3*   *      Cardiac Enzymes No results for input(s): CPK, CKND1, SAY in the last 72 hours. No lab exists for component: CKRMB, TROIP   Coagulation No results for input(s): PTP, INR, APTT, INREXT in the last 72 hours. Lipid Panel Lab Results   Component Value Date/Time    Cholesterol, total 263 (H) 09/16/2010 10:01 AM    HDL Cholesterol 84 (H) 09/16/2010 10:01 AM    LDL, calculated 136.8 (H) 09/16/2010 10:01 AM    VLDL, calculated 42.2 09/16/2010 10:01 AM    Triglyceride 211 (H) 09/16/2010 10:01 AM    CHOL/HDL Ratio 3.1 09/16/2010 10:01 AM      BNP No results for input(s): BNPP in the last 72 hours. Liver Enzymes No results for input(s): TP, ALB, TBIL, AP in the last 72 hours.     No lab exists for component: SGOT, GPT, DBIL   Thyroid Studies Lab Results   Component Value Date/Time    T4, Total 7.7 12/13/2010 05:45 AM    TSH 1.64 09/19/2021 05:31 AM          Procedures/imaging: see electronic medical records for all procedures/Xrays and details which were not copied into this note but were reviewed prior to creation of Plan    Medications:   Current Facility-Administered Medications   Medication Dose Route Frequency    polyethylene glycol (MIRALAX) packet 17 g  17 g Oral DAILY    insulin lispro (HUMALOG) injection   SubCUTAneous TIDAC    heparin (porcine) injection 5,000 Units  5,000 Units SubCUTAneous Q8H    insulin glargine (LANTUS) injection 15 Units  15 Units SubCUTAneous QHS    lisinopriL (PRINIVIL, ZESTRIL) tablet 20 mg  20 mg Oral DAILY    metoprolol tartrate (LOPRESSOR) tablet 25 mg  25 mg Oral Q12H    methIMAzole (TAPAZOLE) tablet 10 mg  10 mg Oral DAILY    oxyCODONE-acetaminophen (PERCOCET) 5-325 mg per tablet 1-2 Tablet  1-2 Tablet Oral Q6H PRN    acetaminophen (TYLENOL) tablet 650 mg  650 mg Oral Q4H PRN    cholecalciferol (VITAMIN D3) capsule 5,000 Units  5,000 Units Oral DAILY    famotidine (PEPCID) tablet 20 mg  20 mg Oral BID    docusate sodium (COLACE) capsule 100 mg  100 mg Oral BID    magnesium hydroxide (MILK OF MAGNESIA) 400 mg/5 mL oral suspension 30 mL  30 mL Oral DAILY PRN    bisacodyL (DULCOLAX) tablet 10 mg  10 mg Oral Q48H PRN    glucose chewable tablet 16 g  4 Tablet Oral PRN    glucagon (GLUCAGEN) injection 1 mg  1 mg IntraMUSCular PRN    dextrose (D50W) injection syrg 12.5-25 g  25-50 mL IntraVENous PRN    cephALEXin (KEFLEX) capsule 500 mg  500 mg Oral Q6H    hydrALAZINE (APRESOLINE) tablet 25 mg  25 mg Oral Q8H PRN       Assessment/Plan     Principal Problem:    Diabetic infection of right foot ([address] (9/2/2021)    Active Problems:    Essential hypertension ()      Vitamin D insufficiency (9/19/2021)      Overview: Vitamin D 25-Hydroxy (9/19/2021) = 24.0      Impaired mobility and ADLs (9/2/2021)      Type 2 diabetes mellitus without complication, with long-term current use of insulin (HCC) ()      Overview: HbA1c (9/3/2021) = 10.8      COVID-19 ruled out by laboratory testing (9/8/2021)      Overview: SARS-CoV-2 ([facility]) (9/8/2021): Not detected      Status post incision and drainage (9/9/2021)      Overview: S/P Incision and drainage, with decompression of multiple subfascial       layers, right foot; Wound debridement, skin, soft tissue, and muscle,       right foot (9/9/2021 - Dr. Praneeth Piper)      History of incision and drainage (9/4/2021)      Overview: S/P Incision and drainage of right foot (9/4/2021 - Dr. Damir Foy.)      Hyperthyroidism ()      Diabetic infection of right foot; S/P Incision and drainage, with decompression of multiple subfascial layers, right foot;  Wound debridement, skin, soft tissue, and muscle, right foot (9/9/2021 - Dr. Praneeth Piper); S/P Incision and drainage of right foot (9/4/2021 -  Agatha Snider.)  Nonweightbearing on the right foot  Wound care nurse consult for [facility] management  Wound care instructions: Staff nurse to perform wound vac dressing changes Monday, Wednesday and Fridays. Remove old dressing from right plantar foot wound and clean site  with wound spray. Apply skin prep  followed barrier ring then drapes. Cut hole over wound, apply adaptic  to wound bed then pack wound with black foam. Applied drapes  followed by diskus.  Settings 125mmhg continuous.    On Keflex through September 28  Continue tylenol and percocet prn      Essential hypertension  BP elevated  Increase Metoprolol 25mg bid to 50mg bid  Continue lisinopril 20mg, hydralazine 25mg q8 prn     Hyperthyroidism  Continue Methimazole     Type 2 diabetes mellitus without complication, with long-term current use of insulin  Glucose has been elevated  Increase Lantus 15-> 18 unit at bedtime, sliding scale TIDAC  Monitor and adjust as needed     Vitamin D insufficiency  Continue D3 5,000 daily         Calli Waterman MD  9/25/2021   [address]  [phone]

## 2021-09-25 NOTE — ROUTINE PROCESS
SHIFT CHANGE NOTE FOR [facility]    Bedside and Verbal shift change report given to Artur Miller RN (oncoming nurse) by Parris Sosa RN (offgoing nurse). Report included the following information SBAR, Kardex, MAR and Recent Results. Situation:   Code Status: Full Code   Hospital Day: 7   Problem List:   Hospital Problems  Date Reviewed: 6/10/2021        Codes Class Noted POA    Essential hypertension (Chronic) ICD-10-CM: I10  ICD-9-CM: 401.9  Unknown Yes        Type 2 diabetes mellitus without complication, with long-term current use of insulin (HCC) (Chronic) ICD-10-CM: E11.9, Z79.4  ICD-9-CM: 250.00, V58.67  Unknown Yes    Overview Signed 9/20/2021 11:13 PM by Esperanza Martínez MD     HbA1c (9/3/2021) = 10.8             Hyperthyroidism (Chronic) ICD-10-CM: E05.90  ICD-9-CM: 242.90  Unknown Yes        Vitamin D insufficiency (Chronic) ICD-10-CM: E55.9  ICD-9-CM: 268.9  9/19/2021 Yes    Overview Signed 9/20/2021 11:11 PM by Esperanza Martínez MD     Vitamin D 25-Hydroxy (9/19/2021) = 24.0             Status post incision and drainage ICD-10-CM: Z98.890  ICD-9-CM: V45.89  9/9/2021 Yes    Overview Signed 9/20/2021 11:18 PM by Esperanza Martínez MD     S/P Incision and drainage, with decompression of multiple subfascial layers, right foot; Wound debridement, skin, soft tissue, and muscle, right foot (9/9/2021 - Dr. Dusty Morrell)             IUBSQ-93 ruled out by laboratory testing ICD-10-CM: Z20.822  ICD-9-CM: V01.79  9/8/2021 Yes    Overview Signed 9/20/2021 11:14 PM by Esperanza Martínez MD     SARS-CoV-2 (Alvin Cardoso, [facility]) (9/8/2021):  Not detected             History of incision and drainage ICD-10-CM: Z98.890  ICD-9-CM: V45.89  9/4/2021 Yes    Overview Signed 9/20/2021 11:18 PM by Esperanza Martínez MD     S/P Incision and drainage of right foot (9/4/2021 - Dr. Paul Torres.)             * (Principal) Diabetic infection of right foot [facility]) ICD-10-CM: E11.628, L08.9  ICD-9-CM: 250.80, 686.9  9/2/2021 Yes Impaired mobility and ADLs ICD-10-CM: Z74.09, Z78.9  ICD-9-CM: V49.89  9/2/2021 Yes              Background:   Past Medical History:   Past Medical History:   Diagnosis Date    COVID-19 ruled out by laboratory testing 9/8/2021    [facility] (9[facility]  Not detected    Diabetic infection of right foot ([address] 9/2/2021    Essential hypertension     Hyperthyroidism     Type 2 diabetes mellitus without complication, with long-term current use of insulin ([facility])     HbA1c (9/3/2021) = 10.8    Vitamin D insufficiency 9/19/2021    Vitamin D 25-Hydroxy (9/19/2021) = 24.0        Assessment:   Changes in Assessment throughout shift: No change to previous assessment    Patient has a central line: no   Patient has Bal Cath: no      Last Vitals:     Vitals:    09/23/21 2116 09/24/21 0825 09/24/21 1618 09/24/21 2022   BP: (!) 143/79 (!) 141/85 (!) 144/81 138/79   Pulse: 94 87 89 89   Resp: 20 19 18 18   Temp: 98 °F (36.7 °C) 97.5 °F (36.4 °C) 97.8 °F (36.6 °C) 98 °F (36.7 °C)   SpO2: 99% 99% 99% 100%   Weight:       Height:            PAIN    Pain Assessment    Pain Intensity 1: 0 (09/25/21 0408) Pain Intensity 1: 2 (12/29/14 1105)    Pain Location 1: Foot Pain Location 1: Abdomen    Pain Intervention(s) 1:  (medicated for dressing change) Pain Intervention(s) 1: Medication (see MAR)  Patient Stated Pain Goal: 0 Patient Stated Pain Goal: 0  o Intervention effective: yes  o Other actions taken for pain:       Skin Assessment  Skin color Skin Color: Appropriate for ethnicity  Condition/Temperature Skin Condition/Temp: Dry, Warm  Integrity Skin Integrity: Wound (add Wound LDA)  Turgor Turgor: Non-tenting  Weekly Pressure Ulcer Documentation  Pressure  Injury Documentation: No Pressure Injury Noted-Pressure Ulcer Prevention Initiated  Wound Prevention & Protection Methods  Orientation of wound Orientation of Wound Prevention: Posterior  Location of Prevention Location of Wound Prevention: Sacrum/Coccyx  Dressing Present Dressing Present : No  Dressing Status    Wound Offloading Wound Offloading (Prevention Methods): Adaptive equipment, Bed, pressure redistribution/air, Bed, pressure reduction mattress, Turning, Repositioning, Pillows, Elevate heels, Wheelchair     INTAKE/OUPUT  Date 09/24/21 0700 - 09/25/21 0659 09/25/21 0700 - 09/26/21 0659   Shift 0700-1859 1900-0659 24 Hour Total 0700-1859 1900-0659 24 Hour Total   INTAKE   P.O. 474  474        P.O. 474  474      Shift Total(mL/kg) 474(4.9)  474(4.9)      OUTPUT   Urine(mL/kg/hr)           Urine Occurrence(s) 1 x 3 x 4 x 1 x  1 x   Stool           Stool Occurrence(s) 1 x 0 x 1 x 0 x  0 x   Shift Total(mL/kg)           474      Weight (kg) 96 96 96 96 96 96       Recommendations:  1. Patient needs and requests: toileting assistance, encourage po fluids; Diabetes educator consult pending, and wound care consult pending for right foot blister, needs scrotum elevated due to edema- Odalis Avila notified to show wife how to perform sling elevation of scrotum to help relief edema    2. Pending tests/procedures: none at this time     3. Functional Level/Equipment: Partial (one person) / Bed (comment); Wheelchair;Stabilization belt    Fall Precautions:   Fall risk precautions were reinforced with the patient; he was instructed to call for help prior to getting up. The following fall risk precautions were continued: bed/ chair alarms, door signage, yellow bracelet and socks as well as update of the Zeyad Stanford tool in the patient's room. Willy Score: 3    HEALS Safety Check    A safety check occurred in the patient's room between off going nurse and oncoming nurse listed above.     The safety check included the below items  Area Items   H  High Alert Medications - Verify all high alert medication drips (heparin, PCA, etc.)   E  Equipment - Suction is set up for ALL patients (with yanker)  - Red plugs utilized for all equipment (IV pumps, etc.)  - WOWs wiped down at end of shift.  - Room stocked with oxygen, suction, and other unit-specific supplies   A  Alarms - Bed alarm is set for fall risk patients  - Ensure chair alarm is in place and activated if patient is up in a chair   L  Lines - Check IV for any infiltration  - Bal bag is empty if patient has a Bal   - Tubing and IV bags are labeled   S  Safety   - Room is clean, patient is clean, and equipment is clean. - Hallways are clear from equipment besides carts. - Fall bracelet on for fall risk patients  - Ensure room is clear and free of clutter  - Suction is set up for ALL patients (with yanker)  - Hallways are clear from equipment besides carts.    - Isolation precautions followed, supplies available outside room, sign posted     Ossie Rinne, RN

## 2021-09-25 NOTE — ROUTINE PROCESS
SHIFT CHANGE NOTE FOR [facility]    Bedside and Verbal shift change report given to Natalie Fountain, RN (oncoming nurse) by Jos eManuel Desai RN (offgoing nurse). Report included the following information SBAR, Kardex, MAR and Recent Results. Situation:   Code Status: Full Code   Hospital Day: 7   Problem List:   Hospital Problems  Date Reviewed: 6/10/2021        Codes Class Noted POA    Essential hypertension (Chronic) ICD-10-CM: I10  ICD-9-CM: 401.9  Unknown Yes        Type 2 diabetes mellitus without complication, with long-term current use of insulin (HCC) (Chronic) ICD-10-CM: E11.9, Z79.4  ICD-9-CM: 250.00, V58.67  Unknown Yes    Overview Signed 9/20/2021 11:13 PM by Colby Koch MD     HbA1c (9/3/2021) = 10.8             Hyperthyroidism (Chronic) ICD-10-CM: E05.90  ICD-9-CM: 242.90  Unknown Yes        Vitamin D insufficiency (Chronic) ICD-10-CM: E55.9  ICD-9-CM: 268.9  9/19/2021 Yes    Overview Signed 9/20/2021 11:11 PM by Colby Koch MD     Vitamin D 25-Hydroxy (9/19/2021) = 24.0             Status post incision and drainage ICD-10-CM: Z98.890  ICD-9-CM: V45.89  9/9/2021 Yes    Overview Signed 9/20/2021 11:18 PM by Colby Koch MD     S/P Incision and drainage, with decompression of multiple subfascial layers, right foot; Wound debridement, skin, soft tissue, and muscle, right foot (9/9/2021 - Dr. Maricruz Khanna)             NXAHR-15 ruled out by laboratory testing ICD-10-CM: Z20.822  ICD-9-CM: V01.79  9/8/2021 Yes    Overview Signed 9/20/2021 11:14 PM by Colby Koch MD     SARS-CoV-2 ([address] [place] [address]) (9/8/2021):  Not detected             History of incision and drainage ICD-10-CM: Z98.890  ICD-9-CM: V45.89  9/4/2021 Yes    Overview Signed 9/20/2021 11:18 PM by Colby Koch MD     S/P Incision and drainage of right foot (9/4/2021 - Dr. Juan Jose Greenwood.)             * (Principal) Diabetic infection of right foot [facility]) ICD-10-CM: E11.628, L08.9  ICD-9-CM: 250.80, 686.9  9/2/2021 Yes Impaired mobility and ADLs ICD-10-CM: Z74.09, Z78.9  ICD-9-CM: V49.89  9/2/2021 Yes              Background:   Past Medical History:   Past Medical History:   Diagnosis Date    COVID-19 ruled out by laboratory testing 9/8/2021    SARS-CoV-2 ([facility] [place], [facility]) (9/8/2021):  Not detected    Diabetic infection of right foot ([address] 9/2/2021    Essential hypertension     Hyperthyroidism     Type 2 diabetes mellitus without complication, with long-term current use of insulin ([facility])     HbA1c (9/3/2021) = 10.8    Vitamin D insufficiency 9/19/2021    Vitamin D 25-Hydroxy (9/19/2021) = 24.0        Assessment:   Changes in Assessment throughout shift: No change to previous assessment    Patient has a central line: no   Patient has Bal Cath: no      Last Vitals:     Vitals:    09/24/21 1618 09/24/21 2022 09/25/21 0813 09/25/21 1516   BP: (!) 144/81 138/79 (!) 157/83 133/74   Pulse: 89 89 96 87   Resp: 18 18 18 16   Temp: 97.8 °F (36.6 °C) 98 °F (36.7 °C) 98.7 °F (37.1 °C) 98.3 °F (36.8 °C)   SpO2: 99% 100% 100% 100%   Weight:       Height:            PAIN    Pain Assessment    Pain Intensity 1: 0 (09/25/21 1616) Pain Intensity 1: 2 (12/29/14 1105)    Pain Location 1: Foot Pain Location 1: Abdomen    Pain Intervention(s) 1:  (medicated for dressing change) Pain Intervention(s) 1: Medication (see MAR)  Patient Stated Pain Goal: 0 Patient Stated Pain Goal: 0  o Intervention effective: yes  o Other actions taken for pain:       Skin Assessment  Skin color Skin Color: Acrocyanosis  Condition/Temperature Skin Condition/Temp: Dry, Warm  Integrity Skin Integrity: Weeping  Turgor Turgor: Non-tenting  Weekly Pressure Ulcer Documentation  Pressure  Injury Documentation: No Pressure Injury Noted-Pressure Ulcer Prevention Initiated  Wound Prevention & Protection Methods  Orientation of wound Orientation of Wound Prevention: Posterior  Location of Prevention Location of Wound Prevention: Buttocks  Dressing Present Dressing Present : No  Dressing Status    Wound Offloading Wound Offloading (Prevention Methods): Bed, pressure redistribution/air     INTAKE/OUPUT  Date 09/24/21 0700 - 09/25/21 0659 09/25/21 0700 - 09/26/21 0659   Shift 0700-1859 1900-0659 24 Hour Total 0700-1859 1900-0659 24 Hour Total   INTAKE   P.O. 474  474 240  240     P. O. 474  474 240  240   Shift Total(mL/kg) 474(4.9)  474(4.9) 240(2.5)  240(2.5)   OUTPUT   Urine(mL/kg/hr)           Urine Occurrence(s) 1 x 3 x 4 x 2 x  2 x   Emesis/NG output    0  0     Emesis    0  0   Stool           Stool Occurrence(s) 1 x 0 x 1 x 0 x  0 x   Shift Total(mL/kg)    0(0)  0(0)     474 240  240   Weight (kg) 96 96 96 96 96 96       Recommendations:  1. Patient needs and requests: toileting assistance, encourage po fluids; Diabetes educator consult pending, and wound care consult pending for right foot blister, needs scrotum elevated due to edema- Nhung Elmoreer notified to show wife how to perform sling elevation of scrotum to help relief edema    2. Pending tests/procedures: none at this time     3. Functional Level/Equipment: Partial (one person) / Bed (comment)    Fall Precautions:   Fall risk precautions were reinforced with the patient; he was instructed to call for help prior to getting up. The following fall risk precautions were continued: bed/ chair alarms, door signage, yellow bracelet and socks as well as update of the Tiesha Gong tool in the patient's room. Willy Score: 3    HEALS Safety Check    A safety check occurred in the patient's room between off going nurse and oncoming nurse listed above.     The safety check included the below items  Area Items   H  High Alert Medications - Verify all high alert medication drips (heparin, PCA, etc.)   E  Equipment - Suction is set up for ALL patients (with yanker)  - Red plugs utilized for all equipment (IV pumps, etc.)  - WOWs wiped down at end of shift.  - Room stocked with oxygen, suction, and other unit-specific supplies   A  Alarms - Bed alarm is set for fall risk patients  - Ensure chair alarm is in place and activated if patient is up in a chair   L  Lines - Check IV for any infiltration  - Bal bag is empty if patient has a Bal   - Tubing and IV bags are labeled   S  Safety   - Room is clean, patient is clean, and equipment is clean. - Hallways are clear from equipment besides carts. - Fall bracelet on for fall risk patients  - Ensure room is clear and free of clutter  - Suction is set up for ALL patients (with darwinker)  - Hallways are clear from equipment besides carts.    - Isolation precautions followed, supplies available outside room, sign posted     Cynthia Espinoza RN

## 2021-09-25 NOTE — PROGRESS NOTES
Problem: Pressure Injury - Risk of  Goal: *Prevention of pressure injury  Description: Document Sarabjit Scale and appropriate interventions in the flowsheet. 9/24/2021 2342 by Wendi Colvin RN  Outcome: Progressing Towards Goal  Note: Pressure Injury Interventions:  Sensory Interventions: Minimize linen layers, Maintain/enhance activity level, Float heels, Monitor skin under medical devices         Activity Interventions: PT/OT evaluation, Pressure redistribution bed/mattress(bed type)    Mobility Interventions: PT/OT evaluation, HOB 30 degrees or less, Float heels    Nutrition Interventions: Document food/fluid/supplement intake, Offer support with meals,snacks and hydration    Friction and Shear Interventions: HOB 30 degrees or less, Minimize layers, Transferring/repositioning devices             9/24/2021 2342 by Wendi Colvin RN  Outcome: Progressing Towards Goal  Note: Pressure Injury Interventions:  Sensory Interventions: Minimize linen layers, Maintain/enhance activity level, Float heels, Monitor skin under medical devices         Activity Interventions: PT/OT evaluation, Pressure redistribution bed/mattress(bed type)    Mobility Interventions: PT/OT evaluation, HOB 30 degrees or less, Float heels    Nutrition Interventions: Document food/fluid/supplement intake, Offer support with meals,snacks and hydration    Friction and Shear Interventions: HOB 30 degrees or less, Minimize layers, Transferring/repositioning devices                Problem: Patient Education: Go to Patient Education Activity  Goal: Patient/Family Education  9/24/2021 2342 by Wendi Colvin RN  Outcome: Progressing Towards Goal  9/24/2021 2342 by Wendi Colvin RN  Outcome: Progressing Towards Goal     Problem: Falls - Risk of  Goal: *Absence of Falls  Description: Document Erika Zuleta Fall Risk and appropriate interventions in the flowsheet.   9/24/2021 2342 by Wendi Colvin RN  Outcome: Progressing Towards Goal  Note: Fall Risk Interventions:  Mobility Interventions: Bed/chair exit alarm, OT consult for ADLs, Patient to call before getting OOB, PT Consult for mobility concerns, Utilize walker, cane, or other assistive device    Mentation Interventions: Adequate sleep, hydration, pain control, Bed/chair exit alarm, Door open when patient unattended, Family/sitter at bedside, More frequent rounding, Gait belt with transfers/ambulation, Update white board, Toileting rounds    Medication Interventions: Bed/chair exit alarm, Patient to call before getting OOB, Teach patient to arise slowly, Utilize gait belt for transfers/ambulation    Elimination Interventions: Bed/chair exit alarm, Call light in reach, Toileting schedule/hourly rounds, Urinal in reach, Patient to call for help with toileting needs           9/24/2021 2342 by Arlette Day RN  Outcome: Progressing Towards Goal  Note: Fall Risk Interventions:  Mobility Interventions: Bed/chair exit alarm, OT consult for ADLs, Patient to call before getting OOB, PT Consult for mobility concerns, Utilize walker, cane, or other assistive device    Mentation Interventions: Adequate sleep, hydration, pain control, Bed/chair exit alarm, Door open when patient unattended, Family/sitter at bedside, More frequent rounding, Gait belt with transfers/ambulation, Update white board, Toileting rounds    Medication Interventions: Bed/chair exit alarm, Patient to call before getting OOB, Teach patient to arise slowly, Utilize gait belt for transfers/ambulation    Elimination Interventions: Bed/chair exit alarm, Call light in reach, Toileting schedule/hourly rounds, Urinal in reach, Patient to call for help with toileting needs              Problem: Patient Education: Go to Patient Education Activity  Goal: Patient/Family Education  9/24/2021 2342 by Arlette Day RN  Outcome: Progressing Towards Goal  9/24/2021 2342 by Arlette Day RN  Outcome: Progressing Towards Goal     Problem: Patient Education: Go to Patient Education Activity  Goal: Patient/Family Education  Outcome: Progressing Towards Goal     Problem: Patient Education: Go to Patient Education Activity  Goal: Patient/Family Education  Outcome: Progressing Towards Goal     Problem: Diabetes Self-Management  Goal: *Disease process and treatment process  Description: Define diabetes and identify own type of diabetes; list 3 options for treating diabetes. Outcome: Not Progressing Towards Goal  Goal: *Incorporating nutritional management into lifestyle  Description: Describe effect of type, amount and timing of food on blood glucose; list 3 methods for planning meals. Outcome: Not Progressing Towards Goal  Goal: *Incorporating physical activity into lifestyle  Description: State effect of exercise on blood glucose levels. Outcome: Not Progressing Towards Goal  Goal: *Developing strategies to promote health/change behavior  Description: Define the ABC's of diabetes; identify appropriate screenings, schedule and personal plan for screenings. Outcome: Not Progressing Towards Goal  Goal: *Using medications safely  Description: State effect of diabetes medications on diabetes; name diabetes medication taking, action and side effects. Outcome: Progressing Towards Goal  Goal: *Monitoring blood glucose, interpreting and using results  Description: Identify recommended blood glucose targets  and personal targets. Outcome: Not Progressing Towards Goal  Goal: *Prevention, detection, treatment of acute complications  Description: List symptoms of hyper- and hypoglycemia; describe how to treat low blood sugar and actions for lowering  high blood glucose level. Outcome: Not Progressing Towards Goal  Goal: *Prevention, detection and treatment of chronic complications  Description: Define the natural course of diabetes and describe the relationship of blood glucose levels to long term complications of diabetes.   Outcome: Not Progressing Towards Goal  Goal: *Developing strategies to address psychosocial issues  Description: Describe feelings about living with diabetes; identify support needed and support network  Outcome: Not Progressing Towards Goal  Goal: *Sick day guidelines  Outcome: Not Progressing Towards Goal     Problem: Patient Education: Go to Patient Education Activity  Goal: Patient/Family Education  Outcome: Not Progressing Towards Goal

## 2021-09-25 NOTE — PROGRESS NOTES
Problem: Self Care Deficits Care Plan (Adult)  Goal: *Acute Goals and Plan of Care (Insert Text)  Description: Occupational Therapy Goals   Long Term Goals  Initiated 2021 and to be accomplished within 3 week(s), by 10/10/2021. 1. Pt will perform self-feeding with I.  2. Pt will perform grooming with I.  3. Pt will perform UB bathing with Mod I and use of AE PRN. 4. Pt will perform LB bathing with Mod I and use of AE PRN. 5. Pt will perform tub/shower transfer with supv and use of LRAD. 6. Pt will perform UB dressing with Mod I.  7. Pt will perform LB dressing with supv and use of AE PRN. 8. Pt will perform toileting task with Mod I.  9. Pt will perform toilet transfer with supv and use of LRAD. Short Term Goals   Initiated 2021 and to be accomplished within 7 day(s), updated 2021. 1. Pt will perform self-feeding with Mod I. ( 2021)  2. Pt will perform grooming with set-up. ( 2021)  3. Pt will perform UB bathing with SBA and use of AE PRN. ( 2021)  4. Pt will perform LB bathing with Min A and use of AE PRN. ( 2021)  5. Pt will perform tub/shower transfer with Min A and use of LRAD. 6. Pt will perform UB dressing with SBA. ( 2021)  7. Pt will perform LB dressing with Mod A and use of AE PRN. ( 2021)  8. Pt will perform toileting task with Mod A. ( 2021)  9. Pt will perform toilet transfer with CGA and use of LRAD. ( 2021)      Outcome: Progressing Towards Goal   OT WEEKLY PROGRESS NOTE  Patient Name:Bacilio MARSH Xavier   Time Spent With Patient  Time In: 930  Time Out: 56  Patient Seen For[de-identified] AM;ADLs    Medical Diagnosis:  Diabetic foot infection ([facility] [address] [L29.228, L08.9] Diabetic infection of right foot ([address]     Pain at start of tx:0/10 pain or discomfort. Pain at stop of tx:0/10 pain or discomfort. 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          Objective: Pt participated in [address] [place] [address] strengthening exercises to facilitate greater independence and safety with functional transfers and tasks. Pt used 5# dowel with 2# weight attached (7# total) to perform shoulder press, chest press, bicep curls and forward rows (x25 each exercise) while seated EOB. Pt used DigiFlex (blue, black) for B hand strengthening exercises (2 sets x 20 reps).       Outcome Measures:      AROM: WFL BUE      COGNITION/PERCEPTION Initial Assessment Weekly Progress Assessment 9/25/2021   Premorbid Reading Status       Premorbid Writing Status       Arousal/Alertness  (Alert & oriented)     Orientation Level Oriented to person, Oriented to place, Oriented to situation Oriented X4   Visual Fields       Praxis       Body Scheme       COMPREHENSION MODE Initial Assessment Weekly Progress Assessment 9/25/2021   Primary Mode of Comprehension Auditory     Hearing Aide None None   Corrective Lenses       Score 5  5     EXPRESSION Initial Assessment Weekly Progress Assessment 9/25/2021   Primary Mode of Expression Verbal Verbal   Score 5 5   Comments         SOCIAL INTERACTION/ PRAGMATICS Initial Assessment Weekly Progress Assessment 9/25/2021   Score 5 5   Comments         PROBLEM SOLVING Initial Assessment Weekly Progress Assessment 9/25/2021   Score 5 5   Comments         MEMORY Initial Assessment Weekly Progress Assessment 9/25/2021   Score 5 5   Comments         EATING Initial Assessment Weekly Progress Assessment 9/25/2021   Functional Level 5 Feeding/Eating  Feeding/Eating Assistance: 7 (Independent)   Comments set-up       GROOMING Initial Assessment Weekly Progress Assessment 9/25/2021   Functional Level 5 Grooming  Grooming Assistance : 5 (Supervision) (setup at EOB)   Tasks completed by patient Brushed teeth, Washed face, Washed hands     Comments Supv/ set-up for grooming tasks       BATHING Initial Assessment Weekly Progress Assessment 9/25/2021   Functional Level 3    Upper Body Bathing  Bathing Assistance, Upper: 5 (Supervision) (setup at EOB)  Position Performed: Seated edge of bed    Lower Body Bathing  Bathing Assistance, Lower : 4 (Contact guard assistance)  Position Performed: Seated in chair;Standing  Adaptive Equipment: Walker     Body parts patient bathed Abdomen, Arm, left, Arm, right, Buttocks, Chest, Zee area     Comments   Comments (UB): Pt washed all UB areas seated EOB. Comments (LB): Pt leaned forward to wash lower legs and left foot. Pt stood using RW for safety and CGA to wash buttocks and periarea. TUB/SHOWER TRANSFER INDEPENDENCE Initial Assessment Weekly Progress Assessment 9/25/2021   Score 0 Functional Transfers  Amount of Assistance Required: 0 (Not tested)     Comments Did not assess due to safety (NWB RLE) with wound vac intact R foot       UPPER BODY DRESSING/UNDRESSING Initial Assessment Weekly Progress Assessment 9/25/2021   Functional Level 4 Upper Body Dressing   Dressing Assistance : 5 (Supervision) (setup with clothing)     Items applied/Steps completed Pullover (4 steps)     Comments SBA/ Min A  Comments: Pt doffed/donned pullover shirt seated EOB. LOWER BODY DRESSING/UNDRESSING Initial Assessment Weekly Progress Assessment 9/25/2021   Functional Level 2 Lower Body Dressing   Dressing Assistance : 4 (Contact guard assistance)  Leg Crossed Method Used: Yes  Position Performed: Seated in chair;Standing  Adaptive Equipment Used: Walker, reacher     Items applied/Steps completed Elastic waist pants (3 steps), Shoe, left (1 step), Underpants (3 steps)     Comments Care coordinated with Patria Fitzgerald, RN for nurse to disconnect wound vac in order to don/doff boxers and elastic waist shorts; Min A to don L sock (1 step) Comments: Pt used reacher to doff underpants and pants. Pt threaded B feet into pants/underpants while seated and stood to pull up over hips/buttocks using RW for safety. Pt placed left foot on knee to darshan sock.       TOILETING Initial Assessment Weekly Progress Assessment 9/25/2021   Functional Level 2 Toileting  Toileting Assistance (FIM Score): 3 (Moderate assistance )  Adaptive Equipment: Elevated seat;Walker   Comments BSC at the bedside; CM & hygiene performed in stance with RW while adhering to NWB R LE; Max A for pants down/up       TOILET TRANSFER INDEPENDENCE Initial Assessment Weekly Progress Assessment 9/25/2021   Transfer score 4 Functional Transfers  Toilet Transfer : Stand pivot transfer with walker  Amount of Assistance Required: 4 (Contact guard assistance)     Comments Min A w/ RW (stand pivot LLE); NWB R LE                ASSESSMENT:  Pt making progress with independence in self-care tasks. Pt has achieved 8/9 STGs. Pt should continue to make progress with improved self-care through increasing activity tolerance and BUE strength  Progression toward goals:  [x]          Improving appropriately and progressing toward goals  []          Improving slowly and progressing toward goals  []          Not making progress toward goals and plan of care will be adjusted     PLAN:  Patient continues to benefit from skilled intervention to address the above impairments. Continue treatment per established plan of care. Discharge Recommendations:  Home Health with assistance  Further Equipment Recommendations for Discharge:  bedside commode     Please refer to the flow sheet for vital signs taken during this treatment. After treatment:   []  Patient left in no apparent distress sitting up in chair  [x]  Patient left in no apparent distress in bed  [x]  Call bell left within reach  []  Nursing notified  [x]  Caregiver present (supportive wife)  []  Bed alarm activated    COMMUNICATION/EDUCATION:   [] Home safety education was provided and the patient/caregiver indicated understanding. [] Patient/family have participated as able in goal setting and plan of care. [x] Patient/family agree to work toward stated goals and plan of care.   [] Patient understands intent and goals of therapy, but is neutral about his/her participation. [] Patient is unable to participate in goal setting and plan of care. Plan of Care: Please see Care Plan for updated STG/LTGs. Family Training:   To be scheduled  Estimated LOS: 10/8/2021    BELA Mir  9/25/2021

## 2021-09-26 LAB
GLUCOSE BLD STRIP.AUTO-MCNC: 110 MG/DL (ref 70–110)
GLUCOSE BLD STRIP.AUTO-MCNC: 124 MG/DL (ref 70–110)
GLUCOSE BLD STRIP.AUTO-MCNC: 225 MG/DL (ref 70–110)
GLUCOSE BLD STRIP.AUTO-MCNC: 244 MG/DL (ref 70–110)
GLUCOSE BLD STRIP.AUTO-MCNC: 73 MG/DL (ref 70–110)
GLUCOSE BLD STRIP.AUTO-MCNC: 76 MG/DL (ref 70–110)

## 2021-09-26 PROCEDURE — 74011636637 HC RX REV CODE- 636/637: Performed by: FAMILY MEDICINE

## 2021-09-26 PROCEDURE — 65310000000 HC RM PRIVATE REHAB

## 2021-09-26 PROCEDURE — 74011636637 HC RX REV CODE- 636/637: Performed by: INTERNAL MEDICINE

## 2021-09-26 PROCEDURE — 74011250637 HC RX REV CODE- 250/637: Performed by: FAMILY MEDICINE

## 2021-09-26 PROCEDURE — 82962 GLUCOSE BLOOD TEST: CPT

## 2021-09-26 PROCEDURE — 74011250636 HC RX REV CODE- 250/636: Performed by: INTERNAL MEDICINE

## 2021-09-26 PROCEDURE — 74011250637 HC RX REV CODE- 250/637: Performed by: EMERGENCY MEDICINE

## 2021-09-26 PROCEDURE — 74011250637 HC RX REV CODE- 250/637: Performed by: INTERNAL MEDICINE

## 2021-09-26 PROCEDURE — 77030025414 HC DRSG VAC ASST SMPLC KCON -B

## 2021-09-26 RX ORDER — INSULIN LISPRO 100 [IU]/ML
2 INJECTION, SOLUTION INTRAVENOUS; SUBCUTANEOUS
Status: DISCONTINUED | OUTPATIENT
Start: 2021-09-26 | End: 2021-10-02

## 2021-09-26 RX ADMIN — CEPHALEXIN 500 MG: 250 CAPSULE ORAL at 00:31

## 2021-09-26 RX ADMIN — POLYETHYLENE GLYCOL 3350 17 G: 17 POWDER, FOR SOLUTION ORAL at 08:47

## 2021-09-26 RX ADMIN — HEPARIN SODIUM 5000 UNITS: 5000 INJECTION INTRAVENOUS; SUBCUTANEOUS at 00:31

## 2021-09-26 RX ADMIN — HEPARIN SODIUM 5000 UNITS: 5000 INJECTION INTRAVENOUS; SUBCUTANEOUS at 08:47

## 2021-09-26 RX ADMIN — METOPROLOL TARTRATE 50 MG: 50 TABLET, FILM COATED ORAL at 08:47

## 2021-09-26 RX ADMIN — METOPROLOL TARTRATE 50 MG: 50 TABLET, FILM COATED ORAL at 21:34

## 2021-09-26 RX ADMIN — OXYCODONE HYDROCHLORIDE AND ACETAMINOPHEN 1 TABLET: 5; 325 TABLET ORAL at 23:30

## 2021-09-26 RX ADMIN — DOCUSATE SODIUM 100 MG: 100 CAPSULE, LIQUID FILLED ORAL at 17:22

## 2021-09-26 RX ADMIN — FAMOTIDINE 20 MG: 20 TABLET, FILM COATED ORAL at 17:22

## 2021-09-26 RX ADMIN — HEPARIN SODIUM 5000 UNITS: 5000 INJECTION INTRAVENOUS; SUBCUTANEOUS at 16:32

## 2021-09-26 RX ADMIN — INSULIN GLARGINE 18 UNITS: 100 INJECTION, SOLUTION SUBCUTANEOUS at 21:35

## 2021-09-26 RX ADMIN — CEPHALEXIN 500 MG: 250 CAPSULE ORAL at 05:53

## 2021-09-26 RX ADMIN — DOCUSATE SODIUM 100 MG: 100 CAPSULE, LIQUID FILLED ORAL at 08:47

## 2021-09-26 RX ADMIN — CEPHALEXIN 500 MG: 250 CAPSULE ORAL at 11:58

## 2021-09-26 RX ADMIN — METHIMAZOLE 10 MG: 10 TABLET ORAL at 08:47

## 2021-09-26 RX ADMIN — CEPHALEXIN 500 MG: 250 CAPSULE ORAL at 23:30

## 2021-09-26 RX ADMIN — CEPHALEXIN 500 MG: 250 CAPSULE ORAL at 17:22

## 2021-09-26 RX ADMIN — FAMOTIDINE 20 MG: 20 TABLET, FILM COATED ORAL at 08:47

## 2021-09-26 RX ADMIN — INSULIN LISPRO 2 UNITS: 100 INJECTION, SOLUTION INTRAVENOUS; SUBCUTANEOUS at 12:00

## 2021-09-26 RX ADMIN — Medication 5000 UNITS: at 08:47

## 2021-09-26 RX ADMIN — INSULIN LISPRO 2 UNITS: 100 INJECTION, SOLUTION INTRAVENOUS; SUBCUTANEOUS at 17:22

## 2021-09-26 RX ADMIN — LISINOPRIL 20 MG: 20 TABLET ORAL at 08:47

## 2021-09-26 RX ADMIN — INSULIN LISPRO 4 UNITS: 100 INJECTION, SOLUTION INTRAVENOUS; SUBCUTANEOUS at 08:48

## 2021-09-26 RX ADMIN — INSULIN LISPRO 2 UNITS: 100 INJECTION, SOLUTION INTRAVENOUS; SUBCUTANEOUS at 11:59

## 2021-09-26 NOTE — ROUTINE PROCESS
SHIFT CHANGE NOTE FOR [facility]    Bedside and Verbal shift change report given to Joao Bryson RN (oncoming nurse) by Smitha Charles RN (offgoing nurse). Report included the following information SBAR, Kardex, MAR and Recent Results. Situation:   Code Status: Full Code   Hospital Day: 8   Problem List:   Hospital Problems  Date Reviewed: 6/10/2021        Codes Class Noted POA    Essential hypertension (Chronic) ICD-10-CM: I10  ICD-9-CM: 401.9  Unknown Yes        Type 2 diabetes mellitus without complication, with long-term current use of insulin (HCC) (Chronic) ICD-10-CM: E11.9, Z79.4  ICD-9-CM: 250.00, V58.67  Unknown Yes    Overview Signed 9/20/2021 11:13 PM by Trina Calderon MD     HbA1c (9/3/2021) = 10.8             Hyperthyroidism (Chronic) ICD-10-CM: E05.90  ICD-9-CM: 242.90  Unknown Yes        Vitamin D insufficiency (Chronic) ICD-10-CM: E55.9  ICD-9-CM: 268.9  9/19/2021 Yes    Overview Signed 9/20/2021 11:11 PM by Trina Calderon MD     Vitamin D 25-Hydroxy (9/19/2021) = 24.0             Status post incision and drainage ICD-10-CM: Z98.890  ICD-9-CM: V45.89  9/9/2021 Yes    Overview Signed 9/20/2021 11:18 PM by Trina Calderon MD     S/P Incision and drainage, with decompression of multiple subfascial layers, right foot; Wound debridement, skin, soft tissue, and muscle, right foot (9/9/2021 - Dr. Kati Boss)             IKQSM-[id number] ruled out by laboratory testing ICD-10-CM: Z20.822  ICD-9-CM: V01.79  9/8/2021 Yes    Overview Signed 9/20/2021 11:14 PM by Trina Calderon MD     SARS-CoV-2 ([address] [place] [address]) (9/8/2021):  Not detected             History of incision and drainage ICD-10-CM: Z98.890  ICD-9-CM: V45.89  9/4/2021 Yes    Overview Signed 9/20/2021 11:18 PM by Trina Calderon MD     S/P Incision and drainage of right foot (9/4/2021 - Dr. Naresh Venegas.)             * (Principal) Diabetic infection of right foot [facility]) ICD-10-CM: E11.628, L08.9  ICD-9-CM: 250.80, 686.9  9/2/2021 Yes Impaired mobility and ADLs ICD-10-CM: Z74.09, Z78.9  ICD-9-CM: V49.89  9/2/2021 Yes              Background:   Past Medical History:   Past Medical History:   Diagnosis Date    COVID-19 ruled out by laboratory testing 9/8/2021    SARS-CoV-2 ([facility]) (9/8/2021):  Not detected    Diabetic infection of right foot ([address] 9/2/2021    Essential hypertension     Hyperthyroidism     Type 2 diabetes mellitus without complication, with long-term current use of insulin ([facility])     HbA1c (9/3/2021) = 10.8    Vitamin D insufficiency 9/19/2021    Vitamin D 25-Hydroxy (9/19/2021) = 24.0        Assessment:   Changes in Assessment throughout shift: No change to previous assessment    Patient has a central line: no   Patient has Bal Cath: no      Last Vitals:     Vitals:    09/25/21 0813 09/25/21 1516 09/25/21 2141 09/25/21 2156   BP: (!) 157/83 133/74 (!) 153/84 (!) 153/84   Pulse: 96 87 86 86   Resp: 18 16 20    Temp: 98.7 °F (37.1 °C) 98.3 °F (36.8 °C) 98.9 °F (37.2 °C)    SpO2: 100% 100% 100%    Weight:       Height:            PAIN    Pain Assessment    Pain Intensity 1: 0 (09/26/21 0000) Pain Intensity 1: 2 (12/29/14 1105)    Pain Location 1: Foot Pain Location 1: Abdomen    Pain Intervention(s) 1:  (medicated for dressing change) Pain Intervention(s) 1: Medication (see MAR)  Patient Stated Pain Goal: 0 Patient Stated Pain Goal: 0  o Intervention effective: yes  o Other actions taken for pain:       Skin Assessment  Skin color Skin Color: Appropriate for ethnicity  Condition/Temperature Skin Condition/Temp: Dry, Warm  Integrity Skin Integrity: Wound (add Wound LDA)  Turgor Turgor: Non-tenting  Weekly Pressure Ulcer Documentation  Pressure  Injury Documentation: No Pressure Injury Noted-Pressure Ulcer Prevention Initiated  Wound Prevention & Protection Methods  Orientation of wound Orientation of Wound Prevention: Posterior  Location of Prevention Location of Wound Prevention: Buttocks, Sacrum/Coccyx  Dressing Present Dressing Present : No  Dressing Status    Wound Offloading Wound Offloading (Prevention Methods): Bed, pressure redistribution/air     INTAKE/OUPUT  Date 09/25/21 0700 - 09/26/21 0659 09/26/21 0700 - 09/27/21 0659   Shift 0700-1859 1900-0659 24 Hour Total 0700-1859 1900-0659 24 Hour Total   INTAKE   P.O. 480  480        P. O. 480  480      Shift Total(mL/kg) 480(5)  480(5)      OUTPUT   Urine(mL/kg/hr)           Urine Occurrence(s) 2 x 2 x 4 x      Emesis/NG output 0  0        Emesis 0  0      Stool           Stool Occurrence(s) 0 x 1 x 1 x      Shift Total(mL/kg) 0(0)  0(0)        480      Weight (kg) 96 96 96 96 96 96       Recommendations:  1. Patient needs and requests: toileting assistance, meds    2. Pending tests/procedures: none at this time     3. Functional Level/Equipment: Partial (one person) / wheelchair    Fall Precautions:   Fall risk precautions were reinforced with the patient; he was instructed to call for help prior to getting up. The following fall risk precautions were continued: bed/ chair alarms, door signage, yellow bracelet and socks as well as update of the Gonzalez Mohsen tool in the patient's room. Willy Score: 3    HEALS Safety Check    A safety check occurred in the patient's room between off going nurse and oncoming nurse listed above.     The safety check included the below items  Area Items   H  High Alert Medications - Verify all high alert medication drips (heparin, PCA, etc.)   E  Equipment - Suction is set up for ALL patients (with yanker)  - Red plugs utilized for all equipment (IV pumps, etc.)  - WOWs wiped down at end of shift.  - Room stocked with oxygen, suction, and other unit-specific supplies   A  Alarms - Bed alarm is set for fall risk patients  - Ensure chair alarm is in place and activated if patient is up in a chair   L  Lines - Check IV for any infiltration  - Bal bag is empty if patient has a Bal   - Tubing and IV bags are labeled   S  Safety   - Room is clean, patient is clean, and equipment is clean. - Hallways are clear from equipment besides carts. - Fall bracelet on for fall risk patients  - Ensure room is clear and free of clutter  - Suction is set up for ALL patients (with roman)  - Hallways are clear from equipment besides carts.    - Isolation precautions followed, supplies available outside room, sign posted     Burgess Patricia RN

## 2021-09-26 NOTE — ROUTINE PROCESS
SHIFT CHANGE NOTE FOR [facility]    Bedside and Verbal shift change report given to Fran Keller, RN (oncoming nurse) by Summer Spann, TAMEKA (offgoing nurse). Report included the following information SBAR, Kardex, MAR and Recent Results. Situation:   Code Status: Full Code   Hospital Day: 8   Problem List:   Hospital Problems  Date Reviewed: 6/10/2021        Codes Class Noted POA    Essential hypertension (Chronic) ICD-10-CM: I10  ICD-9-CM: 401.9  Unknown Yes        Type 2 diabetes mellitus without complication, with long-term current use of insulin (HCC) (Chronic) ICD-10-CM: E11.9, Z79.4  ICD-9-CM: 250.00, V58.67  Unknown Yes    Overview Signed 9/20/2021 11:13 PM by Sienna Torres MD     HbA1c (9/3/2021) = 10.8             Hyperthyroidism (Chronic) ICD-10-CM: E05.90  ICD-9-CM: 242.90  Unknown Yes        Vitamin D insufficiency (Chronic) ICD-10-CM: E55.9  ICD-9-CM: 268.9  9/19/2021 Yes    Overview Signed 9/20/2021 11:11 PM by Sienna Torres MD     Vitamin D 25-Hydroxy (9/19/2021) = 24.0             Status post incision and drainage ICD-10-CM: Z98.890  ICD-9-CM: V45.89  9/9/2021 Yes    Overview Signed 9/20/2021 11:18 PM by Sienna Torres MD     S/P Incision and drainage, with decompression of multiple subfascial layers, right foot; Wound debridement, skin, soft tissue, and muscle, right foot (9/9/2021 - Dr. Jose Baez)             AACME-63 ruled out by laboratory testing ICD-10-CM: Z20.822  ICD-9-CM: V01.79  9/8/2021 Yes    Overview Signed 9/20/2021 11:14 PM by Sienna Torres MD     SARS-CoV-2 ([address] [place] [address]) (9/8/2021):  Not detected             History of incision and drainage ICD-10-CM: Z98.890  ICD-9-CM: V45.89  9/4/2021 Yes    Overview Signed 9/20/2021 11:18 PM by Sienna Torres MD     S/P Incision and drainage of right foot (9/4/2021 - Dr. Danie Dominguez)             * (Principal) Diabetic infection of right foot [facility]) ICD-10-CM: E11.628, L08.9  ICD-9-CM: 250.80, 686.9  9/2/2021 Yes Impaired mobility and ADLs ICD-10-CM: Z74.09, Z78.9  ICD-9-CM: V49.89  9/2/2021 Yes              Background:   Past Medical History:   Past Medical History:   Diagnosis Date    COVID-19 ruled out by laboratory testing 9/8/2021    [facility] (9[facility]  Not detected    Diabetic infection of right foot ([address] 9/2/2021    Essential hypertension     Hyperthyroidism     Type 2 diabetes mellitus without complication, with long-term current use of insulin ([facility])     HbA1c (9/3/2021) = 10.8    Vitamin D insufficiency 9/19/2021    Vitamin D 25-Hydroxy (9/19/2021) = 24.0        Assessment:   Changes in Assessment throughout shift: No change to previous assessment    Patient has a central line: no   Patient has Bal Cath: no      Last Vitals:     Vitals:    09/25/21 2141 09/25/21 2156 09/26/21 0800 09/26/21 1626   BP: (!) 153/84 (!) 153/84 122/69 134/78   Pulse: 86 86 82 86   Resp: 20  16 14   Temp: 98.9 °F (37.2 °C)  98.1 °F (36.7 °C) 99.1 °F (37.3 °C)   SpO2: 100%  99% 99%   Weight:       Height:            PAIN    Pain Assessment    Pain Intensity 1: 0 (09/26/21 1721) Pain Intensity 1: 2 (12/29/14 1105)    Pain Location 1: Foot Pain Location 1: Abdomen    Pain Intervention(s) 1:  (medicated for dressing change) Pain Intervention(s) 1: Medication (see MAR)  Patient Stated Pain Goal: 0 Patient Stated Pain Goal: 0  o Intervention effective: yes  o Other actions taken for pain:       Skin Assessment  Skin color Skin Color: Appropriate for ethnicity  Condition/Temperature Skin Condition/Temp: Dry, Warm  Integrity Skin Integrity: Wound (add Wound LDA)  Turgor Turgor: Non-tenting  Weekly Pressure Ulcer Documentation  Pressure  Injury Documentation: No Pressure Injury Noted-Pressure Ulcer Prevention Initiated  Wound Prevention & Protection Methods  Orientation of wound Orientation of Wound Prevention: Posterior  Location of Prevention Location of Wound Prevention: Buttocks, Sacrum/Coccyx  Dressing Present Dressing Present : No  Dressing Status    Wound Offloading Wound Offloading (Prevention Methods): Bed, pressure redistribution/air     INTAKE/OUPUT  Date 09/25/21 0700 - 09/26/21 0659 09/26/21 0700 - 09/27/21 0659   Shift 0700-1859 1900-0659 24 Hour Total 0700-1859 1900-0659 24 Hour Total   INTAKE   P.O. 480  480 480  480     P. O. 480  480 480  480   Shift Total(mL/kg) 480(5)  480(5) 480(5)  480(5)   OUTPUT   Urine(mL/kg/hr)           Urine Occurrence(s) 2 x 2 x 4 x 0 x  0 x   Emesis/NG output 0  0        Emesis 0  0      Stool           Stool Occurrence(s) 0 x 1 x 1 x 0 x  0 x   Shift Total(mL/kg) 0(0)  0(0)        480 480  480   Weight (kg) 96 96 96 96 96 96       Recommendations:  1. Patient needs and requests: toileting assistance, meds    2. Pending tests/procedures: none at this time     3. Functional Level/Equipment: Partial (one person) / wheelchair    Fall Precautions:   Fall risk precautions were reinforced with the patient; he was instructed to call for help prior to getting up. The following fall risk precautions were continued: bed/ chair alarms, door signage, yellow bracelet and socks as well as update of the Clydene Bone tool in the patient's room. Willy Score: 3    HEALS Safety Check    A safety check occurred in the patient's room between off going nurse and oncoming nurse listed above.     The safety check included the below items  Area Items   H  High Alert Medications - Verify all high alert medication drips (heparin, PCA, etc.)   E  Equipment - Suction is set up for ALL patients (with yanker)  - Red plugs utilized for all equipment (IV pumps, etc.)  - WOWs wiped down at end of shift.  - Room stocked with oxygen, suction, and other unit-specific supplies   A  Alarms - Bed alarm is set for fall risk patients  - Ensure chair alarm is in place and activated if patient is up in a chair   L  Lines - Check IV for any infiltration  - Bal bag is empty if patient has a Bal   - Tubing and IV bags are labeled   S  Safety   - Room is clean, patient is clean, and equipment is clean. - Hallways are clear from equipment besides carts. - Fall bracelet on for fall risk patients  - Ensure room is clear and free of clutter  - Suction is set up for ALL patients (with roman)  - Hallways are clear from equipment besides carts.    - Isolation precautions followed, supplies available outside room, sign posted     Joe Gamboa RN

## 2021-09-26 NOTE — PROGRESS NOTES
Progress Note    Patient: Peter Guevara MRN: [id number]  CSN: [id number]    YOB: 1965  Age: 60 y.o. Sex: male    DOA: 9/18/2021 LOS:  LOS: 8 days                    Subjective:     Primary Rehab Diagnosis: Impaired Mobility and ADLs secondary to:  1. Diabetic infection of right foot  2. S/P Incision and drainage, with decompression of multiple subfascial layers, right foot; Wound debridement, skin, soft tissue, and muscle, right foot (9/9/2021 - Dr. Daniella Bagley)  3. S/P Incision and drainage of right foot (9/4/2021 - Dr. Magali Pitt)    No acute patient concerns today  He and wife are worried that he was supposed to follow up with foot surgeon, Dr. Daniella Bagley, 1 week from discharge date (discharged 9/18/21, one week would have been yesterday), and when wife called their office was told he needs to be seen prior to 10/8/21 (date of discharge). They were advised from nursing staff that we do not transport pts to outpatient appointments from rehab so wondering what they should do. I did call Dr. Isela Pedersen office however it was closed today and will be open tomorrow, no answer from answering service (on hold until I continued to be hung up on). Will message Dr. Mary Rios covering rehab tomorrow. Discussed with nurse today can ask  if possible to have family transport to and from as well. Will put consult to social work. Review of systems  General: No fevers or chills. Cardiovascular: No chest pain or pressure. No palpitations. Pulmonary: No shortness of breath, cough or wheeze. Gastrointestinal: No abdominal pain, nausea, vomiting or diarrhea. Genitourinary: No urinary frequency, urgency, hesitancy or dysuria. Musculoskeletal: pain fairly controlled on current regimen.   Neurologic: No headache    Objective:     Physical Exam:  Visit Vitals  /69 (BP 1 Location: Left upper arm, BP Patient Position: Supine)   Pulse 82   Temp 98.1 °F (36.7 °C) Resp 16   Ht 5' 11\" (1.803 m)   Wt 96 kg (211 lb 9.6 oz)   SpO2 99%   BMI 29.51 kg/m²        General:         Alert, cooperative, no acute distress    HEENT: NC, Atraumatic. PERRLA, anicteric sclerae. Lungs: CTA Bilaterally. No Wheezing/Rhonchi/Rales. Heart:  Regular  rhythm,  No murmur, No Rubs, No Gallops  Abdomen: Soft, Non distended, Non tender.  +Bowel sounds, no HSM  Extremities: Trace bilateral edema, right lower extremity wound vac in place  Psych:   Not anxious or agitated. Neurologic:  CN 2-12 grossly intact, Alert and oriented X 3. No acute neurological deficit. Intake and Output:  Current Shift:  09/26 0701 - 09/26 1900  In: 240 [P.O.:240]  Out: -   Last three shifts:  09/24 1901 - 09/26 0700  In: 480 [P.O.:480]  Out: 0     Labs: Results:       Chemistry No results for input(s): GLU, NA, K, CL, CO2, BUN, CREA, CA, AGAP, BUCR, TBIL, AP, TP, ALB, GLOB, AGRAT in the last 72 hours. No lab exists for component: GPT   CBC w/Diff No results for input(s): WBC, RBC, HGB, HCT, PLT, GRANS, LYMPH, EOS, HGBEXT, HCTEXT, PLTEXT, HGBEXT, HCTEXT, PLTEXT in the last 72 hours. Cardiac Enzymes No results for input(s): CPK, CKND1, SAY in the last 72 hours. No lab exists for component: CKRMB, TROIP   Coagulation No results for input(s): PTP, INR, APTT, INREXT, INREXT in the last 72 hours. Lipid Panel Lab Results   Component Value Date/Time    Cholesterol, total 263 (H) 09/16/2010 10:01 AM    HDL Cholesterol 84 (H) 09/16/2010 10:01 AM    LDL, calculated 136.8 (H) 09/16/2010 10:01 AM    VLDL, calculated 42.2 09/16/2010 10:01 AM    Triglyceride 211 (H) 09/16/2010 10:01 AM    CHOL/HDL Ratio 3.1 09/16/2010 10:01 AM      BNP No results for input(s): BNPP in the last 72 hours. Liver Enzymes No results for input(s): TP, ALB, TBIL, AP in the last 72 hours.     No lab exists for component: SGOT, GPT, DBIL   Thyroid Studies Lab Results   Component Value Date/Time    T4, Total 7.7 12/13/2010 05:45 AM    TSH 1.64 09/19/2021 05:31 AM          Procedures/imaging: see electronic medical records for all procedures/Xrays and details which were not copied into this note but were reviewed prior to creation of Plan    Medications:   Current Facility-Administered Medications   Medication Dose Route Frequency    metoprolol tartrate (LOPRESSOR) tablet 50 mg  50 mg Oral Q12H    insulin glargine (LANTUS) injection 18 Units  18 Units SubCUTAneous QHS    polyethylene glycol (MIRALAX) packet 17 g  17 g Oral DAILY    insulin lispro (HUMALOG) injection   SubCUTAneous TIDAC    heparin (porcine) injection 5,000 Units  5,000 Units SubCUTAneous Q8H    lisinopriL (PRINIVIL, ZESTRIL) tablet 20 mg  20 mg Oral DAILY    methIMAzole (TAPAZOLE) tablet 10 mg  10 mg Oral DAILY    oxyCODONE-acetaminophen (PERCOCET) 5-325 mg per tablet 1-2 Tablet  1-2 Tablet Oral Q6H PRN    acetaminophen (TYLENOL) tablet 650 mg  650 mg Oral Q4H PRN    cholecalciferol (VITAMIN D3) capsule 5,000 Units  5,000 Units Oral DAILY    famotidine (PEPCID) tablet 20 mg  20 mg Oral BID    docusate sodium (COLACE) capsule 100 mg  100 mg Oral BID    magnesium hydroxide (MILK OF MAGNESIA) 400 mg/5 mL oral suspension 30 mL  30 mL Oral DAILY PRN    bisacodyL (DULCOLAX) tablet 10 mg  10 mg Oral Q48H PRN    glucose chewable tablet 16 g  4 Tablet Oral PRN    glucagon (GLUCAGEN) injection 1 mg  1 mg IntraMUSCular PRN    dextrose (D50W) injection syrg 12.5-25 g  25-50 mL IntraVENous PRN    cephALEXin (KEFLEX) capsule 500 mg  500 mg Oral Q6H    hydrALAZINE (APRESOLINE) tablet 25 mg  25 mg Oral Q8H PRN       Assessment/Plan     Principal Problem:    Diabetic infection of right foot ([address] (9/2/2021)    Active Problems:    Essential hypertension ()      Vitamin D insufficiency (9/19/2021)      Overview: Vitamin D 25-Hydroxy (9/19/2021) = 24.0      Impaired mobility and ADLs (9/2/2021)      Type 2 diabetes mellitus without complication, with long-term current use of insulin (HCC) ()      Overview: HbA1c (9/3/2021) = 10.8      COVID-19 ruled out by laboratory testing (9/8/2021)      Overview: SARS-CoV-2 ([facility]) (9/8/2021): Not detected      Status post incision and drainage (9/9/2021)      Overview: S/P Incision and drainage, with decompression of multiple subfascial       layers, right foot; Wound debridement, skin, soft tissue, and muscle,       right foot (9/9/2021 - Dr. Maricruz Khanna)      History of incision and drainage (9/4/2021)      Overview: S/P Incision and drainage of right foot (9/4/2021 - Dr. Liberty Rosas.)      Hyperthyroidism ()      Diabetic infection of right foot; S/P Incision and drainage, with decompression of multiple subfascial layers, right foot; Wound debridement, skin, soft tissue, and muscle, right foot (9/9/2021 - Dr. Maricruz Khanna); S/P Incision and drainage of right foot (9/4/2021 - Dr. Juan Jose Greenwood.)  Nonweightbearing on the right foot  Wound care nurse consult for [facility] management  Wound care instructions: Staff nurse to perform wound vac dressing changes Monday, Wednesday and Fridays. Remove old dressing from right plantar foot wound and clean site  with wound spray. Apply skin prep  followed barrier ring then drapes. Cut hole over wound, apply adaptic  to wound bed then pack wound with black foam. Applied drapes  followed by diskus.  Settings 125mmhg continuous.    On Keflex through September 28  Continue tylenol and percocet prn      Essential hypertension  BP controlled  Continue Metoprolol 50mg bid, lisinopril 20mg, hydralazine 25mg q8 prn     Hyperthyroidism  Continue Methimazole     Type 2 diabetes mellitus without complication, with long-term current use of insulin  Glucose still elevated  Continue Lantus 18 unit at bedtime, sliding scale TIDAC  Add pre-prandial humalog 2 units TIDAC in addition to sliding scale  Monitor and adjust as needed     Vitamin D insufficiency  Continue D3 5,000 daily         Nicol ROMAN MD Janes  9/26/2021   [address]  [phone]

## 2021-09-27 LAB
ANION GAP SERPL CALC-SCNC: 7 MMOL/L (ref 3–18)
BUN SERPL-MCNC: 14 MG/DL (ref 7–18)
BUN/CREAT SERPL: 16 (ref 12–20)
CALCIUM SERPL-MCNC: 8.6 MG/DL (ref 8.5–10.1)
CHLORIDE SERPL-SCNC: 105 MMOL/L (ref 100–111)
CO2 SERPL-SCNC: 26 MMOL/L (ref 21–32)
CREAT SERPL-MCNC: 0.86 MG/DL (ref 0.6–1.3)
GLUCOSE BLD STRIP.AUTO-MCNC: 105 MG/DL (ref 70–110)
GLUCOSE BLD STRIP.AUTO-MCNC: 186 MG/DL (ref 70–110)
GLUCOSE BLD STRIP.AUTO-MCNC: 191 MG/DL (ref 70–110)
GLUCOSE BLD STRIP.AUTO-MCNC: 98 MG/DL (ref 70–110)
GLUCOSE SERPL-MCNC: 181 MG/DL (ref 74–99)
HCT VFR BLD AUTO: 31.9 % (ref 36–48)
HGB BLD-MCNC: 10.6 G/DL (ref 13–16)
PLATELET # BLD AUTO: 470 K/UL (ref 135–420)
POTASSIUM SERPL-SCNC: 4.3 MMOL/L (ref 3.5–5.5)
SODIUM SERPL-SCNC: 138 MMOL/L (ref 136–145)

## 2021-09-27 PROCEDURE — 65310000000 HC RM PRIVATE REHAB

## 2021-09-27 PROCEDURE — 74011250636 HC RX REV CODE- 250/636: Performed by: INTERNAL MEDICINE

## 2021-09-27 PROCEDURE — 85018 HEMOGLOBIN: CPT

## 2021-09-27 PROCEDURE — 97116 GAIT TRAINING THERAPY: CPT

## 2021-09-27 PROCEDURE — 97110 THERAPEUTIC EXERCISES: CPT

## 2021-09-27 PROCEDURE — 97542 WHEELCHAIR MNGMENT TRAINING: CPT

## 2021-09-27 PROCEDURE — 74011250637 HC RX REV CODE- 250/637: Performed by: FAMILY MEDICINE

## 2021-09-27 PROCEDURE — 36415 COLL VENOUS BLD VENIPUNCTURE: CPT

## 2021-09-27 PROCEDURE — 99232 SBSQ HOSP IP/OBS MODERATE 35: CPT | Performed by: EMERGENCY MEDICINE

## 2021-09-27 PROCEDURE — 82962 GLUCOSE BLOOD TEST: CPT

## 2021-09-27 PROCEDURE — 74011636637 HC RX REV CODE- 636/637: Performed by: INTERNAL MEDICINE

## 2021-09-27 PROCEDURE — 74011636637 HC RX REV CODE- 636/637: Performed by: FAMILY MEDICINE

## 2021-09-27 PROCEDURE — 97530 THERAPEUTIC ACTIVITIES: CPT

## 2021-09-27 PROCEDURE — 85049 AUTOMATED PLATELET COUNT: CPT

## 2021-09-27 PROCEDURE — 74011250637 HC RX REV CODE- 250/637: Performed by: INTERNAL MEDICINE

## 2021-09-27 PROCEDURE — 2709999900 HC NON-CHARGEABLE SUPPLY

## 2021-09-27 PROCEDURE — 74011250637 HC RX REV CODE- 250/637: Performed by: EMERGENCY MEDICINE

## 2021-09-27 PROCEDURE — 80048 BASIC METABOLIC PNL TOTAL CA: CPT

## 2021-09-27 RX ADMIN — INSULIN LISPRO 2 UNITS: 100 INJECTION, SOLUTION INTRAVENOUS; SUBCUTANEOUS at 12:29

## 2021-09-27 RX ADMIN — HEPARIN SODIUM 5000 UNITS: 5000 INJECTION INTRAVENOUS; SUBCUTANEOUS at 08:54

## 2021-09-27 RX ADMIN — INSULIN LISPRO 2 UNITS: 100 INJECTION, SOLUTION INTRAVENOUS; SUBCUTANEOUS at 12:28

## 2021-09-27 RX ADMIN — POLYETHYLENE GLYCOL 3350 17 G: 17 POWDER, FOR SOLUTION ORAL at 08:54

## 2021-09-27 RX ADMIN — CEPHALEXIN 500 MG: 250 CAPSULE ORAL at 06:08

## 2021-09-27 RX ADMIN — DOCUSATE SODIUM 100 MG: 100 CAPSULE, LIQUID FILLED ORAL at 08:55

## 2021-09-27 RX ADMIN — METHIMAZOLE 10 MG: 10 TABLET ORAL at 08:55

## 2021-09-27 RX ADMIN — CEPHALEXIN 500 MG: 250 CAPSULE ORAL at 17:03

## 2021-09-27 RX ADMIN — FAMOTIDINE 20 MG: 20 TABLET, FILM COATED ORAL at 08:55

## 2021-09-27 RX ADMIN — INSULIN LISPRO 2 UNITS: 100 INJECTION, SOLUTION INTRAVENOUS; SUBCUTANEOUS at 08:50

## 2021-09-27 RX ADMIN — DOCUSATE SODIUM 100 MG: 100 CAPSULE, LIQUID FILLED ORAL at 17:03

## 2021-09-27 RX ADMIN — CEPHALEXIN 500 MG: 250 CAPSULE ORAL at 12:20

## 2021-09-27 RX ADMIN — INSULIN LISPRO 2 UNITS: 100 INJECTION, SOLUTION INTRAVENOUS; SUBCUTANEOUS at 17:08

## 2021-09-27 RX ADMIN — FAMOTIDINE 20 MG: 20 TABLET, FILM COATED ORAL at 17:03

## 2021-09-27 RX ADMIN — METOPROLOL TARTRATE 50 MG: 50 TABLET, FILM COATED ORAL at 21:41

## 2021-09-27 RX ADMIN — Medication 5000 UNITS: at 08:55

## 2021-09-27 RX ADMIN — LISINOPRIL 20 MG: 20 TABLET ORAL at 08:55

## 2021-09-27 RX ADMIN — HEPARIN SODIUM 5000 UNITS: 5000 INJECTION INTRAVENOUS; SUBCUTANEOUS at 16:56

## 2021-09-27 RX ADMIN — METOPROLOL TARTRATE 50 MG: 50 TABLET, FILM COATED ORAL at 08:54

## 2021-09-27 RX ADMIN — HEPARIN SODIUM 5000 UNITS: 5000 INJECTION INTRAVENOUS; SUBCUTANEOUS at 00:51

## 2021-09-27 RX ADMIN — INSULIN GLARGINE 18 UNITS: 100 INJECTION, SOLUTION SUBCUTANEOUS at 21:40

## 2021-09-27 NOTE — ROUTINE PROCESS
[facility]    Bedside and Verbal shift change report given to Mynor Flores (oncoming nurse) by Rupa Burdick RN (offgoing nurse). Report included the following information SBAR, Kardex, MAR and Recent Results. Situation:   Code Status: Full Code   Hospital Day: 9   Problem List:   Hospital Problems  Date Reviewed: 6/10/2021        Codes Class Noted POA    Essential hypertension (Chronic) ICD-10-CM: I10  ICD-9-CM: 401.9  Unknown Yes        Type 2 diabetes mellitus without complication, with long-term current use of insulin (HCC) (Chronic) ICD-10-CM: E11.9, Z79.4  ICD-9-CM: 250.00, V58.67  Unknown Yes    Overview Signed 9/20/2021 11:13 PM by Casa Delgado MD     HbA1c (9/3/2021) = 10.8             Hyperthyroidism (Chronic) ICD-10-CM: E05.90  ICD-9-CM: 242.90  Unknown Yes        Vitamin D insufficiency (Chronic) ICD-10-CM: E55.9  ICD-9-CM: 268.9  9/19/2021 Yes    Overview Signed 9/20/2021 11:11 PM by Casa Delgado MD     Vitamin D 25-Hydroxy (9/19/2021) = 24.0             Status post incision and drainage ICD-10-CM: Z98.890  ICD-9-CM: V45.89  9/9/2021 Yes    Overview Signed 9/20/2021 11:18 PM by Casa Delgado MD     S/P Incision and drainage, with decompression of multiple subfascial layers, right foot; Wound debridement, skin, soft tissue, and muscle, right foot (9/9/2021 - Dr. Brayden Buckley)             RNPTO-34 ruled out by laboratory testing ICD-10-CM: Z20.822  ICD-9-CM: V01.79  9/8/2021 Yes    Overview Signed 9/20/2021 11:14 PM by Casa Delgado MD     SARS-CoV-2 ([facility]) (9/8/2021):  Not detected             History of incision and drainage ICD-10-CM: Z98.890  ICD-9-CM: V45.89  9/4/2021 Yes    Overview Signed 9/20/2021 11:18 PM by Perfecto Officer, MD     S/P Incision and drainage of right foot (9/4/2021 - Dr. Jose Aguilar.)             * (Principal) Diabetic infection of right foot [facility]) ICD-10-CM: E11.628, L08.9  ICD-9-CM: 250.80, 686.9  9/2/2021 Yes Impaired mobility and ADLs ICD-10-CM: Z74.09, Z78.9  ICD-9-CM: V49.89  9/2/2021 Yes              Background:   Past Medical History:   Past Medical History:   Diagnosis Date    COVID-19 ruled out by laboratory testing 9/8/2021    SARS-CoV-2 ([facility]) (9/8/2021):  Not detected    Diabetic infection of right foot ([address] 9/2/2021    Essential hypertension     Hyperthyroidism     Type 2 diabetes mellitus without complication, with long-term current use of insulin ([facility])     HbA1c (9/3/2021) = 10.8    Vitamin D insufficiency 9/19/2021    Vitamin D 25-Hydroxy (9/19/2021) = 24.0        Assessment:   Changes in Assessment throughout shift: No change to previous assessment    Patient has a central line: no   Patient has Bal Cath: no      Last Vitals:     Vitals:    09/26/21 1626 09/26/21 2134 09/27/21 0821 09/27/21 1541   BP: 134/78 (!) 147/83 129/78 123/73   Pulse: 86 87 84 84   Resp: 14 18 18 18   Temp: 99.1 °F (37.3 °C) 98 °F (36.7 °C) 97.5 °F (36.4 °C) 97.9 °F (36.6 °C)   SpO2: 99% 99% 100% 99%   Weight:       Height:            PAIN    Pain Assessment    Pain Intensity 1: 0 (09/27/21 1616) Pain Intensity 1: 2 (12/29/14 1105)    Pain Location 1: Foot Pain Location 1: Abdomen    Pain Intervention(s) 1:  (premedicated for dressing change) Pain Intervention(s) 1: Medication (see MAR)  Patient Stated Pain Goal: 0 Patient Stated Pain Goal: 0  o Intervention effective: yes  o Other actions taken for pain:       Skin Assessment  Skin color Skin Color: Appropriate for ethnicity  Condition/Temperature Skin Condition/Temp: Dry, Warm  Integrity Skin Integrity: Wound (add Wound LDA)  Turgor Turgor: Non-tenting  Weekly Pressure Ulcer Documentation  Pressure  Injury Documentation: No Pressure Injury Noted-Pressure Ulcer Prevention Initiated  Wound Prevention & Protection Methods  Orientation of wound Orientation of Wound Prevention: Posterior  Location of Prevention Location of Wound Prevention: Sacrum/Coccyx  Dressing Present Dressing Present : No  Dressing Status    Wound Offloading Wound Offloading (Prevention Methods): Bed, pressure reduction mattress     INTAKE/OUPUT  Date 09/26/21 0700 - 09/27/21 0659 09/27/21 0700 - 09/28/21 0659   Shift 6370-4542 5487-7591 24 Hour Total 7349-7630 3041-1838 24 Hour Total   INTAKE   P.O. 720  720 720  720     P. O. 720  720 720  720   Shift Total(mL/kg) 720(7.5)  720(7.5) 720(7.5)  720(7.5)   OUTPUT   Urine(mL/kg/hr)           Urine Occurrence(s) 0 x 2 x 2 x 3 x  3 x   Stool           Stool Occurrence(s) 0 x 0 x 0 x 0 x  0 x   Shift Total(mL/kg)           720 720  720   Weight (kg) 96 96 96 96 96 96       Recommendations:  1. Patient needs and requests: toileting assistance, meds,wound vac dressing change    2. Pending tests/procedures: labs     3. Functional Level/Equipment: Partial (one person) / wheelchair    Fall Precautions:   Fall risk precautions were reinforced with the patient; he was instructed to call for help prior to getting up. The following fall risk precautions were continued: bed/ chair alarms, door signage, yellow bracelet and socks as well as update of the Danney Mess tool in the patient's room. Willy Score: 3    HEALS Safety Check    A safety check occurred in the patient's room between off going nurse and oncoming nurse listed above.     The safety check included the below items  Area Items   H  High Alert Medications - Verify all high alert medication drips (heparin, PCA, etc.)   E  Equipment - Suction is set up for ALL patients (with yanker)  - Red plugs utilized for all equipment (IV pumps, etc.)  - WOWs wiped down at end of shift.  - Room stocked with oxygen, suction, and other unit-specific supplies   A  Alarms - Bed alarm is set for fall risk patients  - Ensure chair alarm is in place and activated if patient is up in a chair   L  Lines - Check IV for any infiltration  - Bal bag is empty if patient has a Bal   - Tubing and IV bags are labeled   S  Safety   - Room is clean, patient is clean, and equipment is clean. - Hallways are clear from equipment besides carts. - Fall bracelet on for fall risk patients  - Ensure room is clear and free of clutter  - Suction is set up for ALL patients (with roman)  - Hallways are clear from equipment besides carts.    - Isolation precautions followed, supplies available outside room, sign posted     Tram Marc RN

## 2021-09-27 NOTE — PROGRESS NOTES
Blood sugar-73. Repeat blood sugar is -76. Orange juice ,peanut butter and crackers given. 2116-blood sugar -124.  2135-Scheduled dose of Lantus administered.

## 2021-09-27 NOTE — ROUTINE PROCESS
SHIFT CHANGE NOTE FOR [facility]    Bedside and Verbal shift change report given to Henrietta RN (oncoming nurse) by Jayne Hayes RN (offgoing nurse). Report included the following information SBAR, Kardex, MAR and Recent Results. Situation:   Code Status: Full Code   Hospital Day: 9   Problem List:   Hospital Problems  Date Reviewed: 6/10/2021        Codes Class Noted POA    Essential hypertension (Chronic) ICD-10-CM: I10  ICD-9-CM: 401.9  Unknown Yes        Type 2 diabetes mellitus without complication, with long-term current use of insulin (HCC) (Chronic) ICD-10-CM: E11.9, Z79.4  ICD-9-CM: 250.00, V58.67  Unknown Yes    Overview Signed 9/20/2021 11:13 PM by Dg Lucas MD     HbA1c (9/3/2021) = 10.8             Hyperthyroidism (Chronic) ICD-10-CM: E05.90  ICD-9-CM: 242.90  Unknown Yes        Vitamin D insufficiency (Chronic) ICD-10-CM: E55.9  ICD-9-CM: 268.9  9/19/2021 Yes    Overview Signed 9/20/2021 11:11 PM by Dg Lucas MD     Vitamin D 25-Hydroxy (9/19/2021) = 24.0             Status post incision and drainage ICD-10-CM: Z98.890  ICD-9-CM: V45.89  9/9/2021 Yes    Overview Signed 9/20/2021 11:18 PM by Dg Lucas MD     S/P Incision and drainage, with decompression of multiple subfascial layers, right foot; Wound debridement, skin, soft tissue, and muscle, right foot (9/9/2021 - Dr. Tiesha Garcia)             SZUCQ-96 ruled out by laboratory testing ICD-10-CM: Z20.822  ICD-9-CM: V01.79  9/8/2021 Yes    Overview Signed 9/20/2021 11:14 PM by Dg Lucas MD     SARS-CoV-2 ([address] [place] [address]) (9/8/2021):  Not detected             History of incision and drainage ICD-10-CM: Z98.890  ICD-9-CM: V45.89  9/4/2021 Yes    Overview Signed 9/20/2021 11:18 PM by Dg Lucas MD     S/P Incision and drainage of right foot (9/4/2021 - Dr. Topher Junior.)             * (Principal) Diabetic infection of right foot [facility]) ICD-10-CM: E11.628, L08.9  ICD-9-CM: 250.80, 686.9  9/2/2021 Yes Impaired mobility and ADLs ICD-10-CM: Z74.09, Z78.9  ICD-9-CM: V49.89  9/2/2021 Yes              Background:   Past Medical History:   Past Medical History:   Diagnosis Date    COVID-19 ruled out by laboratory testing 9/8/2021    SARS-CoV-2 ([facility]) (9/8/2021):  Not detected    Diabetic infection of right foot ([address] 9/2/2021    Essential hypertension     Hyperthyroidism     Type 2 diabetes mellitus without complication, with long-term current use of insulin ([facility])     HbA1c (9/3/2021) = 10.8    Vitamin D insufficiency 9/19/2021    Vitamin D 25-Hydroxy (9/19/2021) = 24.0        Assessment:   Changes in Assessment throughout shift: No change to previous assessment    Patient has a central line: no   Patient has Bal Cath: no      Last Vitals:     Vitals:    09/25/21 2156 09/26/21 0800 09/26/21 1626 09/26/21 2134   BP: (!) 153/84 122/69 134/78 (!) 147/83   Pulse: 86 82 86 87   Resp:  16 14 18   Temp:  98.1 °F (36.7 °C) 99.1 °F (37.3 °C) 98 °F (36.7 °C)   SpO2:  99% 99% 99%   Weight:       Height:            PAIN    Pain Assessment    Pain Intensity 1: 0 (09/27/21 0400) Pain Intensity 1: 2 (12/29/14 1105)    Pain Location 1: Foot Pain Location 1: Abdomen    Pain Intervention(s) 1:  (premedicated for dressing change) Pain Intervention(s) 1: Medication (see MAR)  Patient Stated Pain Goal: 0 Patient Stated Pain Goal: 0  o Intervention effective: yes  o Other actions taken for pain:  (premedicated for dressing change)     Skin Assessment  Skin color Skin Color: Appropriate for ethnicity  Condition/Temperature Skin Condition/Temp: Dry, Warm  Integrity Skin Integrity: Wound (add Wound LDA)  Turgor Turgor: Non-tenting  Weekly Pressure Ulcer Documentation  Pressure  Injury Documentation: No Pressure Injury Noted-Pressure Ulcer Prevention Initiated  Wound Prevention & Protection Methods  Orientation of wound Orientation of Wound Prevention: Posterior  Location of Prevention Location of Wound Prevention: Buttocks, Sacrum/Coccyx  Dressing Present Dressing Present : No  Dressing Status    Wound Offloading Wound Offloading (Prevention Methods): Bed, pressure redistribution/air     INTAKE/OUPUT  Date 09/26/21 0700 - 09/27/21 0659 09/27/21 0700 - 09/28/21 0659   Shift 1410-8260 6211-8583 24 Hour Total 4409-5570 3862-9865 24 Hour Total   INTAKE   P.O. 720  720        P. O. 720  720      Shift Total(mL/kg) 720(7.5)  720(7.5)      OUTPUT   Urine(mL/kg/hr)           Urine Occurrence(s) 0 x 2 x 2 x      Stool           Stool Occurrence(s) 0 x 0 x 0 x      Shift Total(mL/kg)           720      Weight (kg) 96 96 96 96 96 96       Recommendations:  1. Patient needs and requests: toileting assistance, meds,wound vac dressing change    2. Pending tests/procedures: labs     3. Functional Level/Equipment: Partial (one person) / wheelchair    Fall Precautions:   Fall risk precautions were reinforced with the patient; he was instructed to call for help prior to getting up. The following fall risk precautions were continued: bed/ chair alarms, door signage, yellow bracelet and socks as well as update of the Aliya Mallory tool in the patient's room. Willy Score:      HEALS Safety Check    A safety check occurred in the patient's room between off going nurse and oncoming nurse listed above.     The safety check included the below items  Area Items   H  High Alert Medications - Verify all high alert medication drips (heparin, PCA, etc.)   E  Equipment - Suction is set up for ALL patients (with yanker)  - Red plugs utilized for all equipment (IV pumps, etc.)  - WOWs wiped down at end of shift.  - Room stocked with oxygen, suction, and other unit-specific supplies   A  Alarms - Bed alarm is set for fall risk patients  - Ensure chair alarm is in place and activated if patient is up in a chair   L  Lines - Check IV for any infiltration  - Bal bag is empty if patient has a Bal   - Tubing and IV bags are labeled   S  Safety   - Room is clean, patient is clean, and equipment is clean. - Hallways are clear from equipment besides carts. - Fall bracelet on for fall risk patients  - Ensure room is clear and free of clutter  - Suction is set up for ALL patients (with roman)  - Hallways are clear from equipment besides carts.    - Isolation precautions followed, supplies available outside room, sign posted     Apolinar Silverman RN

## 2021-09-27 NOTE — PROGRESS NOTES
Problem: Self Care Deficits Care Plan (Adult)  Goal: *Acute Goals and Plan of Care (Insert Text)  Description: Occupational Therapy Goals   Long Term Goals  Initiated 2021 and to be accomplished within 3 week(s), by 10/10/2021. 1. Pt will perform self-feeding with I.  2. Pt will perform grooming with I.  3. Pt will perform UB bathing with Mod I and use of AE PRN. 4. Pt will perform LB bathing with Mod I and use of AE PRN. 5. Pt will perform tub/shower transfer with supv and use of LRAD. 6. Pt will perform UB dressing with Mod I.  7. Pt will perform LB dressing with supv and use of AE PRN. 8. Pt will perform toileting task with Mod I.  9. Pt will perform toilet transfer with supv and use of LRAD. Short Term Goals   Initiated 2021 and to be accomplished within 7 day(s), updated 2021. 1. Pt will perform self-feeding with Mod I. ( 2021)  2. Pt will perform grooming with set-up. ( 2021)  3. Pt will perform UB bathing with SBA and use of AE PRN. ( 2021)  4. Pt will perform LB bathing with Min A and use of AE PRN. ( 2021)  5. Pt will perform tub/shower transfer with Min A and use of LRAD. 6. Pt will perform UB dressing with SBA. ( 2021)  7. Pt will perform LB dressing with Mod A and use of AE PRN. ( 2021)  8. Pt will perform toileting task with Mod A. ( 2021)  9. Pt will perform toilet transfer with CGA and use of LRAD. ( 2021)      Outcome: Progressing Towards Goal   Occupational Therapy TREATMENT    Patient: Leon Garrison   60 y.o. Patient identified with name and : yes    Date: 2021    First Tx Session  Time In: 80  Time Out[de-identified] 56    Diagnosis: Diabetic foot infection ([facility] [address] [E11.628, L08.9]   Precautions: Fall, NWB  Chart, occupational therapy assessment, plan of care, and goals were reviewed. Pain:  Pt reports 0/10 pain or discomfort prior to treatment. Pt reports 0/10 pain or discomfort post treatment. Intervention Provided: NA      SUBJECTIVE:   Patient stated, \"I like working out. \"    OBJECTIVE DATA SUMMARY:     THERAPEUTIC ACTIVITY Daily Assessment    Pt participated in reaching activity to challenge sitting balance and to increase core strength. Pt used elevated pegboard to place large pegs, transferring pattern from card. Pt required extra time to complete more difficult patterns, needing verbal clues to continue with pattern. For increased challenge, 2# weights were attached to wrist.     THERAPEUTIC EXERCISE Daily Assessment    Pt participated in cardio endurance exercise to facilitate increased activity tolerance. Pt used arm cycle for 10 minutes with moderate tension without requiring rest break. For increased challenge, 1 1/2# weights were attached to wrist.    Pt performed B UE strengthening exercises for carryover to functional transfers. Pt used FlexiBar (blue) to perform forearm supination/pronation and wrist flexion/extension (2 sets x 10 reps each direction)     MOBILITY/TRANSFERS Daily Assessment    Pt self-propelled w/c room <> gym using BUE and left LE for forward propulsion. ASSESSMENT:  Pt demonstrating increased activity tolerance and increased BUE during treatment session. Progression toward goals:  [x]          Improving appropriately and progressing toward goals  []          Improving slowly and progressing toward goals  []          Not making progress toward goals and plan of care will be adjusted     PLAN:  Patient continues to benefit from skilled intervention to address the above impairments. Continue treatment per established plan of care. Discharge Recommendations:  Home Health with asst  Further Equipment Recommendations for Discharge:  bedside commode     Activity Tolerance:  Fair      Estimated LOS:10/8/2021    Please refer to the flowsheet for vital signs taken during this treatment.   After treatment:   [x]  Patient left in no apparent distress sitting up in chair   [] Patient left in no apparent distress in bed  [x]  Call bell left within reach  []  Nursing notified  [x]  Caregiver present (supportive wife)  []  Bed alarm activated    COMMUNICATION/EDUCATION:   [] Home safety education was provided and the patient/caregiver indicated understanding. [] Patient/family have participated as able in goal setting and plan of care. [x] Patient/family agree to work toward stated goals and plan of care. [] Patient understands intent and goals of therapy, but is neutral about his/her participation. [] Patient is unable to participate in goal setting and plan of care.       MARIKA Abarca/ARLNIE

## 2021-09-27 NOTE — PROGRESS NOTES
Problem: Mobility Impaired (Adult and Pediatric)  Goal: *Therapy Goal (Edit Goal, Insert Text)  Description: Physical Therapy Short Term Goals  Initiated 9/20/2021, re-assessed 9/27/21, and to be accomplished within 7 day(s) [10/04/21]  1. Patient will move from supine to sit and sit to supine  and roll side to side in bed with modified independence. (met 9/27)   Goal updated: pt will perform bed mobility with independence without side rails on bed  2. Patient will transfer from bed to chair and chair to bed with Stand-by assistance using the least restrictive device, NWB R LE. (met 9/27, with use of RW)   Goal updated: pt will transfer from bed to chair and chair to bed with Supervision using a RW and NWB R LE.  3.  Patient will perform sit to stand with Stand-by assistance to RW and NWB R LE. (met 9/27/21)   Goals updated: pt will perform sit to stand with supervision to RW and NWB R LE.  4.  (Goal revised) Patient will ambulate with contact guard assist for 50 feet with RW, NWB R LE and hop method. (met 9/27/21)   Goal updated: pt will ambulate 70 ft with RW and SBA on level surfaces, NWB R LE.  5.  Patient will propel w/c 150 ft with supervision on level surfaces using B UE's and L LE. (met 9/27/21)   Goal updated: pt will propel w/c 150 ft with supervision on uneven surfaces outside. Physical Therapy Long Term Goals  Initiated 9/20/2021 and to be accomplished within 21 day(s) [10/11/21]  1. Patient will transfer from bed to chair and chair to bed with modified independence using the least restrictive device. 2.  Patient will perform sit to stand with modified independence and RW. 3.  Patient will ambulate with supervision for 80 feet with RW, NWB R LE, and hop method. (to manage household distances)  4.   Patient will ascend/descend 4 stairs with one handrail(s) with minimal assistance/contact guard assist.          Outcome: Progressing Towards Goal    PHYSICAL THERAPY TREATMENT    Patient: Laurie Keven Haley Santos [de-identified]60 y.o. male)  Date: 2021  Diagnosis: Diabetic foot infection ([facility] [address] [E11.628, L08.9] Diabetic infection of right foot ([facility] [address]  Precautions: Fall, NWB Right LE  Chart, physical therapy assessment, plan of care and goals were reviewed. Time In:1330  Time OAF:2581    Patient seen for: Balance activities;Gait training;Patient education;Transfer training    Pain:  Pt pain was reported as 0/10 pre-treatment. Pt pain was reported as 0/10 post-treatment. Intervention: N/A    Patient identified with name and : yes    SUBJECTIVE:      Pt reports c/o effort needed to remind himself to maintain NWB right LE with mobility. In spite of pt's concerns he demonstrates consistent adherence to NWB precautions throughout mobility training. Pt also reports concerns re: return to work. PT provided encouragement and educated pt re: availability of emotional support on unit and  availability. Pt reports he has yet to speak to his wife re: plan for utilizing a ramp for safe home entry. PT encouraged pt to initiate conversation with wife in preparation for safe d/c home. OBJECTIVE DATA SUMMARY:    Objective:     TRANSFERS Daily Assessment     Transfer Type: Other  Other: stand step with RW  Transfer Assistance : 5 (Supervision/setup)  Sit to Stand Assistance: Supervision  Car Transfers: Supervision  Car Type: car transfer    Pt requires supervision for safety with functional transfers. Pt participated in sit <>  front of mirror for visual feedback performing 10 repetitions to and from RW requiring minimal verbal cues for glut engagement to achieve full upright stand. GAIT Daily Assessment    Gait Description (WDL) Exceptions to WDL    Gait Abnormalities Decreased left foot clearance and step length.     Assistive device Walker, rolling    Ambulation assistance - level surface 5 (Supervision/setup)    Distance 50 Feet (ft)   5 Feet to the right and 5 Feet to the left side stepping with RW    Ambulation assistance- uneven surface  NT    Comments Pt requires supervision for safety and intermittent tactile and verbal cues for scap add and depression with B UE weight bearing on RW. Pt requires max verbal cues for foot clearance and step length with side stepping. BALANCE Daily Assessment     Sitting - Static: Good (unsupported)  Sitting - Dynamic: Good (unsupported)  Standing - Static: Fair  Standing - Dynamic : Impaired        WHEELCHAIR MOBILITY Daily Assessment     Pt propels w/c over level surfaces on unit with modified independence. Pt propels w/c outdoors over concrete sidewalk of varying grades x 120 feet with supervision and minimal assistance for thresholds with verbal cues for safety and path negotiation. ASSESSMENT:  Pt is progressing well demonstrating improved quality of movement and safety within treatment session. Progression toward goals:  [x]      Improving appropriately and progressing toward goals  []      Improving slowly and progressing toward goals  []      Not making progress toward goals and plan of care will be adjusted      PLAN:  Patient continues to benefit from skilled intervention to address the above impairments. Continue treatment per established plan of care. Emphasize safe and efficient movement patterns to promote increased safety and independence with mobility. Discharge Recommendations:  Home Health Physical Therapy with 24 hour assistance. Further Equipment Recommendations for Discharge:  rolling walker and wheelchair 18 inch with removable and elevating leg rests, removable arm rests, gel cushion, and anti-tippers. Estimated Discharge Date: 10/08/2021    Activity Tolerance:   Good  Please refer to the flowsheet for vital signs taken during this treatment.     After treatment:   [] Patient left in no apparent distress in bed  [x] Patient left in no apparent distress sitting up in chair  [] Patient left in no apparent distress in w/c mobilizing under own power  [] Patient left in no apparent distress dining area  [] Patient left in no apparent distress mobilizing under own power  [x] Call bell left within reach  [] Nursing notified  [] Caregiver present  [] Bed alarm activated   [x] Chair alarm activated      Sana Le, PT, DPT  9/27/2021

## 2021-09-27 NOTE — PROGRESS NOTES
Problem: Mobility Impaired (Adult and Pediatric)  Goal: *Therapy Goal (Edit Goal, Insert Text)  Description: Physical Therapy Short Term Goals  Initiated 9/20/2021, re-assessed 9/27/21, and to be accomplished within 7 day(s) [10/04/21]  1. Patient will move from supine to sit and sit to supine  and roll side to side in bed with modified independence. (met 9/27)   Goal updated: pt will perform bed mobility with independence without side rails on bed  2. Patient will transfer from bed to chair and chair to bed with Stand-by assistance using the least restrictive device, NWB R LE. (met 9/27, with use of RW)   Goal updated: pt will transfer from bed to chair and chair to bed with Supervision using a RW and NWB R LE.  3.  Patient will perform sit to stand with Stand-by assistance to RW and NWB R LE. (met 9/27/21)   Goals updated: pt will perform sit to stand with supervision to RW and NWB R LE.  4.  (Goal revised) Patient will ambulate with contact guard assist for 50 feet with RW, NWB R LE and hop method. (met 9/27/21)   Goal updated: pt will ambulate 70 ft with RW and SBA on level surfaces, NWB R LE.  5.  Patient will propel w/c 150 ft with supervision on level surfaces using B UE's and L LE. (met 9/27/21)   Goal updated: pt will propel w/c 150 ft with supervision on uneven surfaces outside. Physical Therapy Long Term Goals  Initiated 9/20/2021 and to be accomplished within 21 day(s) [10/11/21]  1. Patient will transfer from bed to chair and chair to bed with modified independence using the least restrictive device. 2.  Patient will perform sit to stand with modified independence and RW. 3.  Patient will ambulate with supervision for 80 feet with RW, NWB R LE, and hop method. (to manage household distances)  4.   Patient will ascend/descend 4 stairs with one handrail(s) with minimal assistance/contact guard assist.     Outcome: Progressing Towards Goal   PHYSICAL THERAPY WEEKLY PROGRESS NOTE    Patient: Yusra Zurita Keara Connelly [de-identified]60 y.o. male)  Date: 2021  Diagnosis: Diabetic foot infection ([facility] [address] [E11.628, L08.9] Diabetic infection of right foot ([facility] [address]  Precautions: Fall, NWB  Chart, physical therapy assessment, plan of care and goals were reviewed. Time in:1330  Time out:1435    Patient seen for: Balance activities;Gait training;Patient education;Transfer training      Pain:  Pt pain was reported as  2-3/10, R foot, pre-treatment. Pt pain was reported as 2-3/10 R foot, post-treatment. Patient identified with name and : yes    SUBJECTIVE:     Pt without c/o's during session and agreeable to participate in PT. Pt indicated that he was still planning on initiating plans for a ramp to be installed at his house prior to d/c to enter/exit home.      OBJECTIVE DATA SUMMARY:       GROSS ASSESSMENT Weekly Progress Assessment 2021   AROM Within functional limits   Strength Generally decreased, functional   Coordination Within functional limits   Tone Normal   Sensation Intact   PROM         POSTURE Weekly Progress Assessment 2021   Posture (WDL) Exceptions to [facility]   Posture Assessment Forward head       BALANCE Weekly Progress Assessment 2021    Sitting - Static: Good (unsupported)  Sitting - Dynamic: Good (unsupported)  Standing - Static: Fair  Standing - Dynamic : Impaired     BED/CHAIR/WHEELCHAIR TRANSFERS Initial Assessment Weekly Progress Assessment 2021   Rolling Right 5 (Stand-by assistance) 6 (Modified independent)   Rolling Left 5 (Stand-by assistance) 6 (Modified independent)   Supine to Sit 5 (Stand-by assistance) 6 (Modified independent)   Sit to Stand Minimal assistance 5 (stand-by assistance)   Sit to Supine  (CGA for R LE & mgm't of wound vac tube) 6 (Modified independent)   Transfer Type Other Other   Transfer Assistance Needed 4 (Minimal assistance) (with both methods; vc's fo hand placement w/ squat pivot) 5 (stand-by assistance)   Comments    Vc's at times for hand placement; assist for wound vac and wound vac tube management   Car Transfer Minimum assistance (assist for R LE in/out of car & mgm't of door) Supervision   Car Type car simulator in rehab car transfer        [facility] MOBILITY/MANAGEMENT Initial Assessment Weekly Progress Assessment 9/27/2021   Able to Propel (dist) 75 feet 160 feet   Assistance Required  (min A) supervision   Curbs/ramps assistance required 0 (Not tested) 0 (Not tested)   Wheelchair set up assistance required 2 (Maximal assistance)  4 (minimal assistance)   Wheelchair management Manages left brake, Manages right brake (with supervision & vc's) Manages left brake;Manages right brake;Manages right footrest   Comments  Pt utilizes B UE's & L LE to propel w/c. Pt trained to manage leg rest during today's session. WALKING INDEPENDENCE Initial Assessment Weekly Progress Assessment 9/27/2021   Assistive device Walker, rolling, Gait belt Walker, rolling   Ambulation assistance - level surface 4 (Contact guard assistance) 5 (Supervision/setup)   Distance 35 Feet (ft) 50 Feet (ft)   Comments   Slow pace, shortened step length with L LE, assist to manage wound vac; maintains NWB R LE well   Ambulation assistance - unlevel surfaces   NT        GAIT Weekly Progress Assessment 9/27/2021   Gait Description (WDL) Exceptions to WDL   Gait Abnormalities Decreased step clearance (with L LE)     STEPS/STAIRS Initial Assessment Weekly Progress Assessment 9/27/2021   Steps/Stairs ambulated 1 (4\" height step) 3 (4\" steps)    Assistance Required   3 (Moderate assistance)   Rail Use Both  both   Comments    Pt negotiates stairs maintaining right LE NWB with minimal to moderate steadying assistance from PT for balance with pt demonstrating inconsistent safety with left foot placement on the step. Curbs/Ramps    NT       Therapeutic Exercise:   B LE therex, AROM; 10 reps x 2 sets with SBA/CGA  Supine: SLR, hip abd  Side-lying: hip ext, hamstring curls    ASSESSMENT:  Pt tolerated session without c/o's. Pt continues to progress with transfers and gait. Pt demonstrates good ability to maintain NWB R LE during mobility. Pt met all STG's set at eval and goals have been updated accordingly. Due to pt's slow pace and shortened step length during gait, as well as use of wound vac mostly likely when d/c home; pt will likely only ambulate short household distances. Pt will most likely need a w/c at d/c home for safe mobility to maintain NWB status and promote healing to R foot wound. Progression toward goals:  [x]      Improving appropriately and progressing toward goals  []      Improving slowly and progressing toward goals  []      Not making progress toward goals and plan of care will be adjusted     PLAN:  Patient continues to benefit from skilled intervention to address the above impairments. Continue treatment per established plan of care. Discharge Recommendations:  Home Health with 24 hr assistance  Further Equipment Recommendations for Discharge:  rolling walker and wheelchair 18 inch with gel cushion, removable and elevating leg rests, removable arm rests, anti-tippers and brake extenders     Estimated Discharge Date:10/08/21    Activity Tolerance:   Good, pt without c/o's and only needs brief rest breaks occasionally  Please refer to the flowsheet for vital signs taken during this treatment.   After treatment:   [] Patient left in no apparent distress in bed  [x] Patient left in no apparent distress sitting up in chair  [] Patient left in no apparent distress in w/c mobilizing under own power  [] Patient left in no apparent distress dining area  [] Patient left in no apparent distress mobilizing under own power  [x] Call bell left within reach  [] Nursing notified  [] Caregiver present  [] Bed alarm activated   [x] Chair alarm activated      Tami Banuelos, PT  9/27/2021

## 2021-09-27 NOTE — WOUND CARE
Physical Exam   Room 191: pt currently working with therapy at this time.   Kendall PERKINSN, RN, [facility], [address]

## 2021-09-28 LAB
EST. AVERAGE GLUCOSE BLD GHB EST-MCNC: 192 MG/DL
GLUCOSE BLD STRIP.AUTO-MCNC: 177 MG/DL (ref 70–110)
GLUCOSE BLD STRIP.AUTO-MCNC: 194 MG/DL (ref 70–110)
GLUCOSE BLD STRIP.AUTO-MCNC: 220 MG/DL (ref 70–110)
GLUCOSE BLD STRIP.AUTO-MCNC: 73 MG/DL (ref 70–110)
GLUCOSE BLD STRIP.AUTO-MCNC: 87 MG/DL (ref 70–110)
HBA1C MFR BLD: 8.3 % (ref 4.2–5.6)

## 2021-09-28 PROCEDURE — 74011636637 HC RX REV CODE- 636/637: Performed by: INTERNAL MEDICINE

## 2021-09-28 PROCEDURE — 97110 THERAPEUTIC EXERCISES: CPT

## 2021-09-28 PROCEDURE — 74011250637 HC RX REV CODE- 250/637: Performed by: FAMILY MEDICINE

## 2021-09-28 PROCEDURE — 74011250637 HC RX REV CODE- 250/637: Performed by: EMERGENCY MEDICINE

## 2021-09-28 PROCEDURE — 74011250636 HC RX REV CODE- 250/636: Performed by: INTERNAL MEDICINE

## 2021-09-28 PROCEDURE — 74011636637 HC RX REV CODE- 636/637: Performed by: FAMILY MEDICINE

## 2021-09-28 PROCEDURE — 97535 SELF CARE MNGMENT TRAINING: CPT

## 2021-09-28 PROCEDURE — 82962 GLUCOSE BLOOD TEST: CPT

## 2021-09-28 PROCEDURE — 99232 SBSQ HOSP IP/OBS MODERATE 35: CPT | Performed by: EMERGENCY MEDICINE

## 2021-09-28 PROCEDURE — 74011636637 HC RX REV CODE- 636/637: Performed by: EMERGENCY MEDICINE

## 2021-09-28 PROCEDURE — 97530 THERAPEUTIC ACTIVITIES: CPT

## 2021-09-28 PROCEDURE — 74011250637 HC RX REV CODE- 250/637: Performed by: INTERNAL MEDICINE

## 2021-09-28 PROCEDURE — 65310000000 HC RM PRIVATE REHAB

## 2021-09-28 PROCEDURE — 2709999900 HC NON-CHARGEABLE SUPPLY

## 2021-09-28 PROCEDURE — 83036 HEMOGLOBIN GLYCOSYLATED A1C: CPT

## 2021-09-28 PROCEDURE — 97116 GAIT TRAINING THERAPY: CPT

## 2021-09-28 RX ORDER — INSULIN GLARGINE 100 [IU]/ML
16 INJECTION, SOLUTION SUBCUTANEOUS
Status: DISCONTINUED | OUTPATIENT
Start: 2021-09-28 | End: 2021-09-29

## 2021-09-28 RX ADMIN — FAMOTIDINE 20 MG: 20 TABLET, FILM COATED ORAL at 17:43

## 2021-09-28 RX ADMIN — INSULIN LISPRO 2 UNITS: 100 INJECTION, SOLUTION INTRAVENOUS; SUBCUTANEOUS at 08:41

## 2021-09-28 RX ADMIN — HEPARIN SODIUM 5000 UNITS: 5000 INJECTION INTRAVENOUS; SUBCUTANEOUS at 17:43

## 2021-09-28 RX ADMIN — INSULIN GLARGINE 16 UNITS: 100 INJECTION, SOLUTION SUBCUTANEOUS at 21:21

## 2021-09-28 RX ADMIN — CEPHALEXIN 500 MG: 250 CAPSULE ORAL at 06:19

## 2021-09-28 RX ADMIN — CEPHALEXIN 500 MG: 250 CAPSULE ORAL at 00:02

## 2021-09-28 RX ADMIN — POLYETHYLENE GLYCOL 3350 17 G: 17 POWDER, FOR SOLUTION ORAL at 08:42

## 2021-09-28 RX ADMIN — FAMOTIDINE 20 MG: 20 TABLET, FILM COATED ORAL at 08:39

## 2021-09-28 RX ADMIN — HEPARIN SODIUM 5000 UNITS: 5000 INJECTION INTRAVENOUS; SUBCUTANEOUS at 00:01

## 2021-09-28 RX ADMIN — INSULIN LISPRO 2 UNITS: 100 INJECTION, SOLUTION INTRAVENOUS; SUBCUTANEOUS at 12:31

## 2021-09-28 RX ADMIN — HEPARIN SODIUM 5000 UNITS: 5000 INJECTION INTRAVENOUS; SUBCUTANEOUS at 08:42

## 2021-09-28 RX ADMIN — LISINOPRIL 20 MG: 20 TABLET ORAL at 08:41

## 2021-09-28 RX ADMIN — DOCUSATE SODIUM 100 MG: 100 CAPSULE, LIQUID FILLED ORAL at 17:43

## 2021-09-28 RX ADMIN — INSULIN LISPRO 4 UNITS: 100 INJECTION, SOLUTION INTRAVENOUS; SUBCUTANEOUS at 08:41

## 2021-09-28 RX ADMIN — INSULIN LISPRO 2 UNITS: 100 INJECTION, SOLUTION INTRAVENOUS; SUBCUTANEOUS at 12:32

## 2021-09-28 RX ADMIN — OXYCODONE HYDROCHLORIDE AND ACETAMINOPHEN 2 TABLET: 5; 325 TABLET ORAL at 23:59

## 2021-09-28 RX ADMIN — DOCUSATE SODIUM 100 MG: 100 CAPSULE, LIQUID FILLED ORAL at 08:40

## 2021-09-28 RX ADMIN — METHIMAZOLE 10 MG: 10 TABLET ORAL at 08:41

## 2021-09-28 RX ADMIN — Medication 5000 UNITS: at 08:40

## 2021-09-28 RX ADMIN — METOPROLOL TARTRATE 50 MG: 50 TABLET, FILM COATED ORAL at 21:22

## 2021-09-28 RX ADMIN — CEPHALEXIN 500 MG: 250 CAPSULE ORAL at 12:38

## 2021-09-28 RX ADMIN — METOPROLOL TARTRATE 50 MG: 50 TABLET, FILM COATED ORAL at 08:39

## 2021-09-28 NOTE — ROUTINE PROCESS
SHIFT CHANGE NOTE FOR [facility]    Bedside and Verbal shift change report given to Apurva RN (oncoming nurse) by Panfilo Tamez RN (offgoing nurse). Report included the following information SBAR, Kardex, MAR and Recent Results. Situation:   Code Status: Full Code   Hospital Day: 10   Problem List:   Hospital Problems  Date Reviewed: 6/10/2021        Codes Class Noted POA    Essential hypertension (Chronic) ICD-10-CM: I10  ICD-9-CM: 401.9  Unknown Yes        Type 2 diabetes mellitus without complication, with long-term current use of insulin (HCC) (Chronic) ICD-10-CM: E11.9, Z79.4  ICD-9-CM: 250.00, V58.67  Unknown Yes    Overview Signed 9/20/2021 11:13 PM by Piero Shah MD     HbA1c (9/3/2021) = 10.8             Hyperthyroidism (Chronic) ICD-10-CM: E05.90  ICD-9-CM: 242.90  Unknown Yes        Vitamin D insufficiency (Chronic) ICD-10-CM: E55.9  ICD-9-CM: 268.9  9/19/2021 Yes    Overview Signed 9/20/2021 11:11 PM by Piero Shah MD     Vitamin D 25-Hydroxy (9/19/2021) = 24.0             Status post incision and drainage ICD-10-CM: Z98.890  ICD-9-CM: V45.89  9/9/2021 Yes    Overview Signed 9/20/2021 11:18 PM by Piero Shah MD     S/P Incision and drainage, with decompression of multiple subfascial layers, right foot; Wound debridement, skin, soft tissue, and muscle, right foot (9/9/2021 - Dr. Meche Boo)             SXMIF-53 ruled out by laboratory testing ICD-10-CM: Z20.822  ICD-9-CM: V01.79  9/8/2021 Yes    Overview Signed 9/20/2021 11:14 PM by Piero Shah MD     SARS-CoV-2 ([address] [place] [address]) (9/8/2021):  Not detected             History of incision and drainage ICD-10-CM: Z98.890  ICD-9-CM: V45.89  9/4/2021 Yes    Overview Signed 9/20/2021 11:18 PM by Piero Shah MD     S/P Incision and drainage of right foot (9/4/2021 - Dr. Carlos Tejada.)             * (Principal) Diabetic infection of right foot [facility]) ICD-10-CM: E11.628, L08.9  ICD-9-CM: 250.80, 686.9  9/2/2021 Yes Impaired mobility and ADLs ICD-10-CM: Z74.09, Z78.9  ICD-9-CM: V49.89  9/2/2021 Yes              Background:   Past Medical History:   Past Medical History:   Diagnosis Date    COVID-19 ruled out by laboratory testing 9/8/2021    SARS-CoV-2 ([facility]) (9/8/2021):  Not detected    Diabetic infection of right foot ([address] 9/2/2021    Essential hypertension     Hyperthyroidism     Type 2 diabetes mellitus without complication, with long-term current use of insulin ([facility])     HbA1c (9/3/2021) = 10.8    Vitamin D insufficiency 9/19/2021    Vitamin D 25-Hydroxy (9/19/2021) = 24.0        Assessment:   Changes in Assessment throughout shift:       Patient has a central line: no Reasons if yes: n/a  Insertion date:n/a Last dressing date: n/a   Patient has Bal Cath: no Reasons if yes: n/a   Insertion date: n/a     Last Vitals:     Vitals:    09/27/21 2107 09/27/21 2200 09/28/21 0727 09/28/21 1546   BP: (!) 141/78 (!) 141/78 (!) 140/82 117/68   Pulse: 81 81 88 80   Resp: 18 18 19 18   Temp: 98.4 °F (36.9 °C) 98.4 °F (36.9 °C) 98.8 °F (37.1 °C) 98.7 °F (37.1 °C)   SpO2: 99%  100% 100%   Weight:       Height:            PAIN    Pain Assessment    Pain Intensity 1: 0 (09/28/21 1658) Pain Intensity 1: 2 (12/29/14 1105)    Pain Location 1: Foot Pain Location 1: Abdomen    Pain Intervention(s) 1:  (premedicated for dressing change) Pain Intervention(s) 1: Medication (see MAR)  Patient Stated Pain Goal: 0 Patient Stated Pain Goal: 0  o Intervention effective: no  o Other actions taken for pain:       Skin Assessment  Skin color Skin Color: Appropriate for ethnicity  Condition/Temperature Skin Condition/Temp: Dry, Warm  Integrity Skin Integrity: Wound (add Wound LDA)  Turgor Turgor: Non-tenting  Weekly Pressure Ulcer Documentation  Pressure  Injury Documentation: No Pressure Injury Noted-Pressure Ulcer Prevention Initiated  Wound Prevention & Protection Methods  Orientation of wound Orientation of Wound Prevention: Posterior  Location of Prevention Location of Wound Prevention: Sacrum/Coccyx  Dressing Present Dressing Present : No  Dressing Status    Wound Offloading Wound Offloading (Prevention Methods): Bed, pressure reduction mattress     INTAKE/OUPUT  Date 09/27/21 1900 - 09/28/21 0659 09/28/21 0700 - 09/29/21 0659   Shift 8401-5573 24 Hour Total 3856-0992 4605-7858 24 Hour Total   INTAKE   P.O. 150 870 480  480     P. O. 150 870 480  480   Shift Total(mL/kg) 150(1.6) 870(9.1) 480(5)  480(5)   OUTPUT   Urine(mL/kg/hr)          Urine Occurrence(s) 3 x 6 x 3 x  3 x   Emesis/NG output          Emesis Occurrence(s) 0 x 0 x      Stool          Stool Occurrence(s) 0 x 0 x 0 x  0 x   Shift Total(mL/kg)         870 480  480   Weight (kg) 96 96 96 96 96       Recommendations:  1. Patient needs and requests: pain management; toileting    2. Pending tests/procedures:  Routine labs     3. Functional Level/Equipment:   / Bed (comment)    Fall Precautions:   Fall risk precautions were reinforced with the patient; he was instructed to call for help prior to getting up. The following fall risk precautions were continued: bed/ chair alarms, door signage, yellow bracelet and socks as well as update of the [employer]  tool in the patient's room. Willy Score:      HEALS Safety Check    A safety check occurred in the patient's room between off going nurse and oncoming nurse listed above.     The safety check included the below items  Area Items   H  High Alert Medications - Verify all high alert medication drips (heparin, PCA, etc.)   E  Equipment - Suction is set up for ALL patients (with yanker)  - Red plugs utilized for all equipment (IV pumps, etc.)  - WOWs wiped down at end of shift.  - Room stocked with oxygen, suction, and other unit-specific supplies   A  Alarms - Bed alarm is set for fall risk patients  - Ensure chair alarm is in place and activated if patient is up in a chair   L  Lines - Check IV for any infiltration  - Bal bag is empty if patient has a Bal   - Tubing and IV bags are labeled   S  Safety   - Room is clean, patient is clean, and equipment is clean. - Hallways are clear from equipment besides carts. - Fall bracelet on for fall risk patients  - Ensure room is clear and free of clutter  - Suction is set up for ALL patients (with yanker)  - Hallways are clear from equipment besides carts.    - Isolation precautions followed, supplies available outside room, sign posted     Ruth Denny RN

## 2021-09-28 NOTE — WOUND CARE
Physical Exam    Received patient sitting in wheelchair. Blister to the right lateral foot covered with a silicone dressing, removed silicone dressing to assess blister and recovered with silicone dressing. Continue to cover with silicone dressing. Right foot is swollen, no complaints from patient. Educated patient to elevate right leg while in bed to decrease swelling. Provided education to wife regarding diabetic diet and [email or web address].  Josie Tate, Student RN [employer]

## 2021-09-28 NOTE — PROGRESS NOTES
Problem: Mobility Impaired (Adult and Pediatric)  Goal: *Therapy Goal (Edit Goal, Insert Text)  Description: Physical Therapy Short Term Goals  Initiated 9/20/2021, re-assessed 9/27/21, and to be accomplished within 7 day(s) [10/04/21]  1. Patient will move from supine to sit and sit to supine  and roll side to side in bed with modified independence. (met 9/27)   Goal updated: pt will perform bed mobility with independence without side rails on bed  2. Patient will transfer from bed to chair and chair to bed with Stand-by assistance using the least restrictive device, NWB R LE. (met 9/27, with use of RW)   Goal updated: pt will transfer from bed to chair and chair to bed with Supervision using a RW and NWB R LE.  3.  Patient will perform sit to stand with Stand-by assistance to RW and NWB R LE. (met 9/27/21)   Goals updated: pt will perform sit to stand with supervision to RW and NWB R LE.  4.  (Goal revised) Patient will ambulate with contact guard assist for 50 feet with RW, NWB R LE and hop method. (met 9/27/21)   Goal updated: pt will ambulate 70 ft with RW and SBA on level surfaces, NWB R LE.  5.  Patient will propel w/c 150 ft with supervision on level surfaces using B UE's and L LE. (met 9/27/21)   Goal updated: pt will propel w/c 150 ft with supervision on uneven surfaces outside. Physical Therapy Long Term Goals  Initiated 9/20/2021 and to be accomplished within 21 day(s) [10/11/21]  1. Patient will transfer from bed to chair and chair to bed with modified independence using the least restrictive device. 2.  Patient will perform sit to stand with modified independence and RW. 3.  Patient will ambulate with supervision for 80 feet with RW, NWB R LE, and hop method. (to manage household distances)  4.   Patient will ascend/descend 4 stairs with one handrail(s) with minimal assistance/contact guard assist.     Outcome: Progressing Towards Goal    PHYSICAL THERAPY TREATMENT    Patient: Neela Lee (57 y.o. male)  Date: 2021  Diagnosis: Diabetic foot infection ([facility] [E11.628, L08.9] Diabetic infection of right foot ([facility]  Precautions: Fall, NWB Right LE  Chart, physical therapy assessment, plan of care and goals were reviewed. Time In:1033  Time IMY:2507    Patient seen for: Balance activities;Gait training;Patient education;Transfer training    Pain:  Pt pain was reported as no c/o pain pre-treatment. Pt pain was reported as no c/o pain post-treatment. Intervention: N/A    Patient identified with name and : yes    SUBJECTIVE:      Pt is pleasant and cooperative throughout treatment session. 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 OBJECTIVE DATA SUMMARY:    Objective:     TRANSFERS Daily Assessment     Transfer Type: Other  Other: stand step with RW  Transfer Assistance : 5 (Supervision/setup)  Sit to Stand Assistance: Supervision   Pt requires supervision for safety. Pt performed 10 repetitions sit <>  front of mirror for visual feedback with pt requiring minimal verbal cues for slow controlled pace with increased anterior weight shift for sit to stand and decreased compensatory reliance on B UE weight bearing. Pt requires supervision for safety with RW management and attention to wound vac line with stand step transfers.         GAIT Daily Assessment    Gait Description (WDL) Exceptions to WDL    Gait Abnormalities  Decreased left step length and foot clearance while maintaining right LE NWB    Assistive device Walker, rolling    Ambulation assistance - level surface 5 (Supervision/setup)    Distance 93 Feet (ft) x 2 trials    Ambulation assistance- uneven surface  NT    Comments Pt requires minimal tactile and verbal cues for scap add and depression and upright posture as pt demonstrates increased flexion and rounded shoulders with fatigue. BALANCE Daily Assessment     Sitting - Static: Good (unsupported)  Sitting - Dynamic: Good (unsupported)  Standing - Static: Fair  Standing - Dynamic : Impaired        WHEELCHAIR MOBILITY Daily Assessment     Pt propels w/c over level surfaces with modified independence utilizing B LEs. THERAPEUTIC EXERCISES Daily Assessment     Seated LE Strength Training:  Pt performed 10 repetitions right LE LAQ but became distracted during activity talking about past experience with [employer] arts requiring maximal cues for redirection to task and to maintain quality and appropriate movement pattern. ASSESSMENT:  Pt is progressing with functional mobility demonstrating improved consistency with ambulation longer distances with no LOB noted. Progression toward goals:  [x]      Improving appropriately and progressing toward goals  []      Improving slowly and progressing toward goals  []      Not making progress toward goals and plan of care will be adjusted      PLAN:  Patient continues to benefit from skilled intervention to address the above impairments. Continue treatment per established plan of care. Emphasize functional strengthening to promote increased safety and independence with mobility. Discharge Recommendations:  Home Health Physical Therapy with supervision to progress to Outpatient Physical Therapy  Further Equipment Recommendations for Discharge:  gait belt, rolling walker and wheelchair 18 inch with gel cushion, removable arm rests, removable and elevating leg rests. Estimated Discharge Date: 10/08/2021    Activity Tolerance:   Good  Please refer to the flowsheet for vital signs taken during this treatment.     After treatment:   [] Patient left in no apparent distress in bed  [x] Patient left in no apparent distress sitting up in chair  [] Patient left in no apparent distress in w/c mobilizing under own power  [] Patient left in no apparent distress dining area  [] Patient left in no apparent distress mobilizing under own power  [x] Call bell left within reach  [] Nursing notified  [x] Pt's wife present in room  [] Bed alarm activated   [x] Chair alarm activated      Charly Pablo, PT, DPT  9/28/2021

## 2021-09-28 NOTE — PROGRESS NOTES
Problem: Pressure Injury - Risk of  Goal: *Prevention of pressure injury  Description: Document Sarabjit Scale and appropriate interventions in the flowsheet.   9/27/2021 2259 by Christie Dimas RN  Outcome: Progressing Towards Goal  Note: Pressure Injury Interventions:  Sensory Interventions: Assess changes in LOC         Activity Interventions: Chair cushion    Mobility Interventions: Chair cushion    Nutrition Interventions: Document food/fluid/supplement intake    Friction and Shear Interventions: Feet elevated on foot rest             9/27/2021 2259 by Christie Dimas RN  Outcome: Progressing Towards Goal  Note: Pressure Injury Interventions:  Sensory Interventions: Assess changes in LOC         Activity Interventions: Chair cushion    Mobility Interventions: Chair cushion    Nutrition Interventions: Document food/fluid/supplement intake    Friction and Shear Interventions: Feet elevated on foot rest

## 2021-09-28 NOTE — ROUTINE PROCESS
SHIFT CHANGE NOTE FOR [facility]    Bedside and Verbal shift change report given to Henrietta RN (oncoming nurse) by Karla Willis, TAMEKA (offgoing nurse). Report included the following information SBAR, Kardex, MAR and Recent Results. Situation:   Code Status: Full Code   Hospital Day: 10   Problem List:   Hospital Problems  Date Reviewed: 6/10/2021        Codes Class Noted POA    Essential hypertension (Chronic) ICD-10-CM: I10  ICD-9-CM: 401.9  Unknown Yes        Type 2 diabetes mellitus without complication, with long-term current use of insulin (HCC) (Chronic) ICD-10-CM: E11.9, Z79.4  ICD-9-CM: 250.00, V58.67  Unknown Yes    Overview Signed 9/20/2021 11:13 PM by Bull Sam MD     HbA1c (9/3/2021) = 10.8             Hyperthyroidism (Chronic) ICD-10-CM: E05.90  ICD-9-CM: 242.90  Unknown Yes        Vitamin D insufficiency (Chronic) ICD-10-CM: E55.9  ICD-9-CM: 268.9  9/19/2021 Yes    Overview Signed 9/20/2021 11:11 PM by Bull Sam MD     Vitamin D 25-Hydroxy (9/19/2021) = 24.0             Status post incision and drainage ICD-10-CM: Z98.890  ICD-9-CM: V45.89  9/9/2021 Yes    Overview Signed 9/20/2021 11:18 PM by Bull Sam MD     S/P Incision and drainage, with decompression of multiple subfascial layers, right foot; Wound debridement, skin, soft tissue, and muscle, right foot (9/9/2021 - Dr. Ellen Montes De Oca)             SVTQD-56 ruled out by laboratory testing ICD-10-CM: Z20.822  ICD-9-CM: V01.79  9/8/2021 Yes    Overview Signed 9/20/2021 11:14 PM by Bull Sam MD     SARS-CoV-2 ([address] [place] [address]) (9/8/2021):  Not detected             History of incision and drainage ICD-10-CM: Z98.890  ICD-9-CM: V45.89  9/4/2021 Yes    Overview Signed 9/20/2021 11:18 PM by Bull Sam MD     S/P Incision and drainage of right foot (9/4/2021 - Dr. Escobar Roman)             * (Principal) Diabetic infection of right foot [facility]) ICD-10-CM: E11.628, L08.9  ICD-9-CM: 250.80, 686.9  9/2/2021 Yes Impaired mobility and ADLs ICD-10-CM: Z74.09, Z78.9  ICD-9-CM: V49.89  9/2/2021 Yes              Background:   Past Medical History:   Past Medical History:   Diagnosis Date    COVID-19 ruled out by laboratory testing 9/8/2021    SARS-CoV-2 ([facility]) (9/8/2021):  Not detected    Diabetic infection of right foot ([address] 9/2/2021    Essential hypertension     Hyperthyroidism     Type 2 diabetes mellitus without complication, with long-term current use of insulin ([facility])     HbA1c (9/3/2021) = 10.8    Vitamin D insufficiency 9/19/2021    Vitamin D 25-Hydroxy (9/19/2021) = 24.0        Assessment:   Changes in Assessment throughout shift: No change to previous assessment     Patient has a central line: no Reasons if yes: n/a  Insertion date:n/a Last dressing date: n/a   Patient has Bal Cath: no Reasons if yes: n/a   Insertion date: n/a     Last Vitals:     Vitals:    09/27/21 0821 09/27/21 1541 09/27/21 2107 09/27/21 2200   BP: 129/78 123/73 (!) 141/78 (!) 141/78   Pulse: 84 84 81 81   Resp: 18 18 18 18   Temp: 97.5 °F (36.4 °C) 97.9 °F (36.6 °C) 98.4 °F (36.9 °C) 98.4 °F (36.9 °C)   SpO2: 100% 99% 99%    Weight:       Height:            PAIN    Pain Assessment    Pain Intensity 1: 0 (09/28/21 0404) Pain Intensity 1: 2 (12/29/14 1105)    Pain Location 1: Foot Pain Location 1: Abdomen    Pain Intervention(s) 1:  (premedicated for dressing change) Pain Intervention(s) 1: Medication (see MAR)  Patient Stated Pain Goal: 0 Patient Stated Pain Goal: 0  o Intervention effective: no  o Other actions taken for pain:       Skin Assessment  Skin color Skin Color: Appropriate for ethnicity  Condition/Temperature Skin Condition/Temp: Dry, Warm  Integrity Skin Integrity: Wound (add Wound LDA)  Turgor Turgor: Non-tenting  Weekly Pressure Ulcer Documentation  Pressure  Injury Documentation: No Pressure Injury Noted-Pressure Ulcer Prevention Initiated  Wound Prevention & Protection Methods  Orientation of wound Orientation of Wound Prevention: Posterior  Location of Prevention Location of Wound Prevention: Sacrum/Coccyx  Dressing Present Dressing Present : No  Dressing Status    Wound Offloading Wound Offloading (Prevention Methods): Bed, pressure reduction mattress     INTAKE/OUPUT  Date 09/27/21 0700 - 09/28/21 0659 09/28/21 0700 - 09/29/21 0659   Shift 8247-8610 3286-6431 24 Hour Total 4966-7153 0720-5672 24 Hour Total   INTAKE   P.O. 720 150 870        P. O. 720 150 870      Shift Total(mL/kg) 720(7.5) 150(1.6) 870(9.1)      OUTPUT   Urine(mL/kg/hr)           Urine Occurrence(s) 3 x 2 x 5 x      Emesis/NG output           Emesis Occurrence(s)  0 x 0 x      Stool           Stool Occurrence(s) 0 x 0 x 0 x      Shift Total(mL/kg)          150 870      Weight (kg) 96 96 96 96 96 96       Recommendations:  1. Patient needs and requests: pain management; toileting    2. Pending tests/procedures:  Routine labs     3. Functional Level/Equipment: Partial (one person) / Wheelchair    Fall Precautions:   Fall risk precautions were reinforced with the patient; he was instructed to call for help prior to getting up. The following fall risk precautions were continued: bed/ chair alarms, door signage, yellow bracelet and socks as well as update of the [employer] tool in the patient's room. Willy Score: 3    HEALS Safety Check    A safety check occurred in the patient's room between off going nurse and oncoming nurse listed above.     The safety check included the below items  Area Items   H  High Alert Medications - Verify all high alert medication drips (heparin, PCA, etc.)   E  Equipment - Suction is set up for ALL patients (with yanker)  - Red plugs utilized for all equipment (IV pumps, etc.)  - WOWs wiped down at end of shift.  - Room stocked with oxygen, suction, and other unit-specific supplies   A  Alarms - Bed alarm is set for fall risk patients  - Ensure chair alarm is in place and activated if patient is up in a chair L  Lines - Check IV for any infiltration  - Bal bag is empty if patient has a Bal   - Tubing and IV bags are labeled   S  Safety   - Room is clean, patient is clean, and equipment is clean. - Hallways are clear from equipment besides carts. - Fall bracelet on for fall risk patients  - Ensure room is clear and free of clutter  - Suction is set up for ALL patients (with yanker)  - Hallways are clear from equipment besides carts.    - Isolation precautions followed, supplies available outside room, sign posted     Yoandy Gibbons, RN

## 2021-09-28 NOTE — ROUTINE PROCESS
SHIFT CHANGE NOTE FOR [facility]    Bedside and Verbal shift change report given to Henrietta RN (oncoming nurse) by Alex Victor, TAMEKA (offgoing nurse). Report included the following information SBAR, Kardex, MAR and Recent Results. Situation:   Code Status: Full Code   Hospital Day: 10   Problem List:   Hospital Problems  Date Reviewed: 6/10/2021        Codes Class Noted POA    Essential hypertension (Chronic) ICD-10-CM: I10  ICD-9-CM: 401.9  Unknown Yes        Type 2 diabetes mellitus without complication, with long-term current use of insulin (HCC) (Chronic) ICD-10-CM: E11.9, Z79.4  ICD-9-CM: 250.00, V58.67  Unknown Yes    Overview Signed 9/20/2021 11:13 PM by Claire Weaver MD     HbA1c (9/3/2021) = 10.8             Hyperthyroidism (Chronic) ICD-10-CM: E05.90  ICD-9-CM: 242.90  Unknown Yes        Vitamin D insufficiency (Chronic) ICD-10-CM: E55.9  ICD-9-CM: 268.9  9/19/2021 Yes    Overview Signed 9/20/2021 11:11 PM by Claire Weaver MD     Vitamin D 25-Hydroxy (9/19/2021) = 24.0             Status post incision and drainage ICD-10-CM: Z98.890  ICD-9-CM: V45.89  9/9/2021 Yes    Overview Signed 9/20/2021 11:18 PM by Claire Weaver MD     S/P Incision and drainage, with decompression of multiple subfascial layers, right foot; Wound debridement, skin, soft tissue, and muscle, right foot (9/9/2021 - Dr. Pilo Somers)             EWUMH-88 ruled out by laboratory testing ICD-10-CM: Z20.822  ICD-9-CM: V01.79  9/8/2021 Yes    Overview Signed 9/20/2021 11:14 PM by Claire Weaver MD     SARS-CoV-2 ([address] [place] [address]) (9/8/2021):  Not detected             History of incision and drainage ICD-10-CM: Z98.890  ICD-9-CM: V45.89  9/4/2021 Yes    Overview Signed 9/20/2021 11:18 PM by Claire Weaver MD     S/P Incision and drainage of right foot (9/4/2021 - Dr. Silvina Bartlett)             * (Principal) Diabetic infection of right foot [facility]) ICD-10-CM: E11.628, L08.9  ICD-9-CM: 250.80, 686.9  9/2/2021 Yes Impaired mobility and ADLs ICD-10-CM: Z74.09, Z78.9  ICD-9-CM: V49.89  9/2/2021 Yes              Background:   Past Medical History:   Past Medical History:   Diagnosis Date    COVID-19 ruled out by laboratory testing 9/8/2021    SARS-CoV-2 ([facility]) (9/8/2021):  Not detected    Diabetic infection of right foot ([address] 9/2/2021    Essential hypertension     Hyperthyroidism     Type 2 diabetes mellitus without complication, with long-term current use of insulin ([facility])     HbA1c (9/3/2021) = 10.8    Vitamin D insufficiency 9/19/2021    Vitamin D 25-Hydroxy (9/19/2021) = 24.0        Assessment:   Changes in Assessment throughout shift: No change to previous assessment     Patient has a central line: no Reasons if yes: n/a  Insertion date:n/a Last dressing date: n/a   Patient has Bal Cath: no Reasons if yes: n/a   Insertion date: n/a     Last Vitals:     Vitals:    09/27/21 0821 09/27/21 1541 09/27/21 2107 09/27/21 2200   BP: 129/78 123/73 (!) 141/78 (!) 141/78   Pulse: 84 84 81 81   Resp: 18 18 18 18   Temp: 97.5 °F (36.4 °C) 97.9 °F (36.6 °C) 98.4 °F (36.9 °C) 98.4 °F (36.9 °C)   SpO2: 100% 99% 99%    Weight:       Height:            PAIN    Pain Assessment    Pain Intensity 1: 0 (09/28/21 0404) Pain Intensity 1: 2 (12/29/14 1105)    Pain Location 1: Foot Pain Location 1: Abdomen    Pain Intervention(s) 1:  (premedicated for dressing change) Pain Intervention(s) 1: Medication (see MAR)  Patient Stated Pain Goal: 0 Patient Stated Pain Goal: 0  o Intervention effective: no  o Other actions taken for pain:       Skin Assessment  Skin color Skin Color: Appropriate for ethnicity  Condition/Temperature Skin Condition/Temp: Dry, Warm  Integrity Skin Integrity: Wound (add Wound LDA)  Turgor Turgor: Non-tenting  Weekly Pressure Ulcer Documentation  Pressure  Injury Documentation: No Pressure Injury Noted-Pressure Ulcer Prevention Initiated  Wound Prevention & Protection Methods  Orientation of wound Orientation of Wound Prevention: Posterior  Location of Prevention Location of Wound Prevention: Sacrum/Coccyx  Dressing Present Dressing Present : No  Dressing Status    Wound Offloading Wound Offloading (Prevention Methods): Bed, pressure reduction mattress     INTAKE/OUPUT  Date 09/27/21 0700 - 09/28/21 0659 09/28/21 0700 - 09/29/21 0659   Shift [id number] 8353-9511 24 Hour Total 5438-4735 5874-0885 24 Hour Total   INTAKE   P.O. 720 150 870        P. O. 720 150 870      Shift Total(mL/kg) 720(7.5) 150(1.6) 870(9.1)      OUTPUT   Urine(mL/kg/hr)           Urine Occurrence(s) 3 x 3 x 6 x      Emesis/NG output           Emesis Occurrence(s)  0 x 0 x      Stool           Stool Occurrence(s) 0 x 0 x 0 x      Shift Total(mL/kg)          150 870      Weight (kg) 96 96 96 96 96 96       Recommendations:  1. Patient needs and requests: pain management; toileting    2. Pending tests/procedures:  Routine labs     3. Functional Level/Equipment: Partial (one person) / Wheelchair    Fall Precautions:   Fall risk precautions were reinforced with the patient; he was instructed to call for help prior to getting up. The following fall risk precautions were continued: bed/ chair alarms, door signage, yellow bracelet and socks as well as update of the [employer] tool in the patient's room. Willy Score: 3    HEALS Safety Check    A safety check occurred in the patient's room between off going nurse and oncoming nurse listed above.     The safety check included the below items  Area Items   H  High Alert Medications - Verify all high alert medication drips (heparin, PCA, etc.)   E  Equipment - Suction is set up for ALL patients (with yanker)  - Red plugs utilized for all equipment (IV pumps, etc.)  - WOWs wiped down at end of shift.  - Room stocked with oxygen, suction, and other unit-specific supplies   A  Alarms - Bed alarm is set for fall risk patients  - Ensure chair alarm is in place and activated if patient is up in a chair L  Lines - Check IV for any infiltration  - Bal bag is empty if patient has a Bal   - Tubing and IV bags are labeled   S  Safety   - Room is clean, patient is clean, and equipment is clean. - Hallways are clear from equipment besides carts. - Fall bracelet on for fall risk patients  - Ensure room is clear and free of clutter  - Suction is set up for ALL patients (with yanker)  - Hallways are clear from equipment besides carts.    - Isolation precautions followed, supplies available outside room, sign posted     Rosalio Flores RN

## 2021-09-28 NOTE — PROGRESS NOTES
[x] Psychology  [] Social Work [] Recreational Therapy    INTERVENTION  UNITS/TIME OF SERVICE   Assessment    Supportive Counseling  September 28, 2021   Orientation    Discharge Planning    Resource Linkage              Progress/Current Status    Individual support  and follow up with patient this morning on ARU; he was found sitting upright in wheelchair and immediately amenable on contact. Patient describes feeling satisfied with progress and encouarged; and he is now aware of suggested date for discharge. He remains motivated to participate in therapy program on ARU and essentially offers no significant complaints about care provided to him. Patient's mood is obviously stable and there are no indications nor reports of underlying distress. Furthermore, patient seems to be well supported by spouse who is with him regularly. Patient was advised that all discharge needs will be addressed in due time. At this time, he does not seem to be focused on immediate need for \"return to work. \"  He seems to understand fully that his recovery is a top priority at this time. Patient encouraged to continue to persevere in his therapy effort.     Reed Olmos, PHD 9/28/2021 11:12 AM

## 2021-09-28 NOTE — PROGRESS NOTES
[address]  [address] [place] [address], [address]     INPATIENT REHABILITATION  DAILY PROGRESS NOTE     Date: 9/27/2021    Name: Emi Akbar Age / Sex: 60 y.o. / male   CSN: [id number] MRN: [id number]   [facility] Date: 9/18/2021 Length of Stay: 9 days     Primary Rehab Diagnosis: Impaired Mobility and ADLs secondary to:  1. Diabetic infection of right foot  2. S/P Incision and drainage, with decompression of multiple subfascial layers, right foot; Wound debridement, skin, soft tissue, and muscle, right foot (9/9/2021 - Dr. Velia Carlos)  3.  S/P Incision and drainage of right foot (9/4/2021 - Dr. Jairo Navarro.)      Subjective:     Patient is sitting in bed in no apparent distress, awake, follows commands  Objective:     Vital Signs:  Patient Vitals for the past 24 hrs:   BP Temp Pulse Resp SpO2   09/27/21 1541 123/73 97.9 °F (36.6 °C) 84 18 99 %   09/27/21 0821 129/78 97.5 °F (36.4 °C) 84 18 100 %   09/26/21 2134 (!) 147/83 98 °F (36.7 °C) 87 18 99 %        Physical Examination:  General:  Awake, alert  Cardiovascular:  S1S2+, RRR  Pulmonary:  CTA b/l  GI:  Soft, BS+, NT, ND  Extremities:  No edema  Right foot with wound VAC in place         Current Medications:  Current Facility-Administered Medications   Medication Dose Route Frequency    insulin lispro (HUMALOG) injection 2 Units  2 Units SubCUTAneous TIDAC    metoprolol tartrate (LOPRESSOR) tablet 50 mg  50 mg Oral Q12H    insulin glargine (LANTUS) injection 18 Units  18 Units SubCUTAneous QHS    polyethylene glycol (MIRALAX) packet 17 g  17 g Oral DAILY    insulin lispro (HUMALOG) injection   SubCUTAneous TIDAC    heparin (porcine) injection 5,000 Units  5,000 Units SubCUTAneous Q8H    lisinopriL (PRINIVIL, ZESTRIL) tablet 20 mg  20 mg Oral DAILY    methIMAzole (TAPAZOLE) tablet 10 mg  10 mg Oral DAILY    oxyCODONE-acetaminophen (PERCOCET) 5-325 mg per tablet 1-2 Tablet  1-2 Tablet Oral Q6H PRN    acetaminophen (TYLENOL) tablet 650 mg  650 mg Oral Q4H PRN    cholecalciferol (VITAMIN D3) capsule 5,000 Units  5,000 Units Oral DAILY    famotidine (PEPCID) tablet 20 mg  20 mg Oral BID    docusate sodium (COLACE) capsule 100 mg  100 mg Oral BID    magnesium hydroxide (MILK OF MAGNESIA) 400 mg/5 mL oral suspension 30 mL  30 mL Oral DAILY PRN    bisacodyL (DULCOLAX) tablet 10 mg  10 mg Oral Q48H PRN    glucose chewable tablet 16 g  4 Tablet Oral PRN    glucagon (GLUCAGEN) injection 1 mg  1 mg IntraMUSCular PRN    dextrose (D50W) injection syrg 12.5-25 g  25-50 mL IntraVENous PRN    cephALEXin (KEFLEX) capsule 500 mg  500 mg Oral Q6H    hydrALAZINE (APRESOLINE) tablet 25 mg  25 mg Oral Q8H PRN       Allergies:  No Known Allergies    Lab/Data Review:  Recent Results (from the past 24 hour(s))   GLUCOSE, POC    Collection Time: 09/26/21  8:33 PM   Result Value Ref Range    Glucose (POC) 73 70 - 110 mg/dL   GLUCOSE, POC    Collection Time: 09/26/21  8:35 PM   Result Value Ref Range    Glucose (POC) 76 70 - 110 mg/dL   GLUCOSE, POC    Collection Time: 09/26/21  9:16 PM   Result Value Ref Range    Glucose (POC) 124 (H) 70 - 110 mg/dL   PLATELET COUNT    Collection Time: 09/27/21  6:14 AM   Result Value Ref Range    PLATELET 071 (H) 234 - 665 K/uL   METABOLIC PANEL, BASIC    Collection Time: 09/27/21  6:14 AM   Result Value Ref Range    Sodium 138 136 - 145 mmol/L    Potassium 4.3 3.5 - 5.5 mmol/L    Chloride 105 100 - 111 mmol/L    CO2 26 21 - 32 mmol/L    Anion gap 7 3.0 - 18 mmol/L    Glucose 181 (H) 74 - 99 mg/dL    BUN 14 7.0 - 18 MG/DL    Creatinine 0.86 0.6 - 1.3 MG/DL    BUN/Creatinine ratio 16 12 - 20      GFR est AA >60 >60 ml/min/1.73m2    GFR est non-AA >60 >60 ml/min/1.73m2    Calcium 8.6 8.5 - 10.1 MG/DL   HGB & HCT    Collection Time: 09/27/21  6:20 AM   Result Value Ref Range    HGB 10.6 (L) 13.0 - 16.0 g/dL    HCT 31.9 (L) 36.0 - 48.0 %   GLUCOSE, POC    Collection Time: 09/27/21  8:19 AM Result Value Ref Range    Glucose (POC) 186 (H) 70 - 110 mg/dL   GLUCOSE, POC    Collection Time: 09/27/21 11:26 AM   Result Value Ref Range    Glucose (POC) 191 (H) 70 - 110 mg/dL   GLUCOSE, POC    Collection Time: 09/27/21  5:04 PM   Result Value Ref Range    Glucose (POC) 98 70 - 110 mg/dL       Assessment:     Primary Rehabilitation Diagnosis  1. Impaired Mobility and ADLs  2. Diabetic infection of right foot  2. S/P Incision and drainage, with decompression of multiple subfascial layers, right foot; Wound debridement, skin, soft tissue, and muscle, right foot (9/9/2021 - Dr. Chilango Olivares)  3. S/P Incision and drainage of right foot (9/4/2021 - Dr. Indra Castillo.)    Comorbidities  Patient Active Problem List   Diagnosis Code    Diabetic infection of right foot ([facility] E11.628, L08.9    Essential hypertension I10    Vitamin D insufficiency E55.9    Impaired mobility and ADLs Z74.09, Z78.9    Type 2 diabetes mellitus without complication, with long-term current use of insulin ([facility] E11.9, Z79.4    COVID-19 ruled out by laboratory testing Z20.822    Status post incision and drainage Z98.890    History of incision and drainage Z98.890    Hyperthyroidism E05.90       Plan:     1. Justification for continued stay: Good progression towards established rehabilitation goals. 2. Medical Issues being followed closely:    [x]  Fall and safety precautions     [x]  Wound Care     [x]  Bowel and Bladder Function     [x]  Fluid Electrolyte and Nutrition Balance     [x]  Pain Control      3. Issues that 24 hour rehabilitation nursing is following:    [x]  Fall and safety precautions     [x]  Wound Care     [x]  Bowel and Bladder Function     [x]  Fluid Electrolyte and Nutrition Balance     [x]  Pain Control      [x]  Assistance with and education on in-room safety with transfers to and from the bed, wheelchair, toilet and shower.       4. Acute rehabilitation plan of care:    [x]  Continue current care and rehab.           [x]  Physical Therapy           [x]  Occupational Therapy           []  Speech Therapy     []  Hold Rehab until further notice     5. Medications:    [x]  MAR Reviewed     [x]  Continue Present Medications     6. DVT Prophylaxis:      []  Enoxaparin     [x]  Unfractionated Heparin     []  Warfarin     []  NOAC     []  WYATT Stockings     []  Sequential Compression Device     []  None     7. Code status    [x]  Full code     []  Partial code     []  Do not intubate     []  Do not resuscitate     8. Orders:   > Diabetic infection of right foot; S/P Incision and drainage, with decompression of multiple subfascial layers, right foot; Wound debridement, skin, soft tissue, and muscle, right foot (9/9/2021 - Dr. Praneeth Piper); S/P Incision and drainage of right foot (9/4/2021 - Dr. Mirian Hannon.)   > Nonweightbearing on the right foot   > Wound care nurse consult for WoundVac management   > Wound care instructions: Staff nurse to perform wound vac dressing changes Monday, Wednesday and fridays. Remove old dressing from right plantar foot wound and clean site  with wound spray. Apply skin prep  followed barrier ring then drapes. Cut hole over wound, apply adaptic  to wound bed then pack wound with black foam. Applied drapes  followed by diskus.  Settings 125mmhg continuous. > Continue Keflex through September 28    > Essential hypertension   > Continue metoprolol and lisinopril and hydralazine    > Hyperthyroidism   > Continue methimazole    > Type 2 diabetes mellitus without complication, with long-term current use of insulin   > Continue Lantus and sliding scale insulin    > Vitamin D insufficiency   Supplement    > Analgesia   > Tylenol, Percocet as needed    > Diet:   > Diabetic  Bowel regimen    9. Personal Protective Equipment ([id number] face mask) was used while interacting with the patient. Patient was using a surgical mask. Discussed with patient.   Discussed with       Signed: Timi Hurtado MD      September 27, 2021

## 2021-09-28 NOTE — PROGRESS NOTES
Problem: Pressure Injury - Risk of  Goal: *Prevention of pressure injury  Description: Document Sarabjit Scale and appropriate interventions in the flowsheet.   Outcome: Progressing Towards Goal  Note: Pressure Injury Interventions:  Sensory Interventions: Assess changes in LOC         Activity Interventions: Chair cushion, Increase time out of bed, Pressure redistribution bed/mattress(bed type)    Mobility Interventions: Chair cushion, HOB 30 degrees or less, Pressure redistribution bed/mattress (bed type)    Nutrition Interventions: Document food/fluid/supplement intake    Friction and Shear Interventions: HOB 30 degrees or less, Transferring/repositioning devices                Problem: Patient Education: Go to Patient Education Activity  Goal: Patient/Family Education  Outcome: Progressing Towards Goal

## 2021-09-28 NOTE — DIABETES MGMT
Diabetes Patient/Family Education Record    Factors That May Influence Patients Ability to Learn or Comply with Recommendations   []   Language barrier    []   Cultural needs   []   Motivation    []   Cognitive limitation    []   Physical   [x]   Education    []   Physiological factors   []   Hearing/vision/speaking impairment   []   [employer] beliefs    []   Financial factors   []  Other:   []  No factors identified at this time. Person Instructed:   [x]   Patient: he reported history of diabetes since mid 19's and has been on insulin. []   Family   []  Other     Preference for Learning:   [x]   Verbal   [x]   Written: diab educ packet/booklet   []  Demonstration     Level of Comprehension & Competence:    [x]  Good                                      [] Fair                                     []  Poor                             []  Needs Reinforcement   [x]  Teach back completed    Education Component:   [x]  Medication management, including how to administer insulin (if appropriate) and potential medication interactions: Patient reported taking prescribed lantus insulin daily and meal time bolus humalog insulin (breakfast, lunch, dinner). [x]  Nutritional management - [x] Obtained usual meal pattern   [x]   Basic carbohydrate counting  []  Plate method  [x]  Limit concentrated sweets and avoid sweetened beverages  [x]  Portion control  [x]    Avoid skipping meals   [x]  Exercise: Patient with right foot diabetic infection status post incision and drainage, wound debridement. He verbalized understanding that he will wait for clearance before resuming weight bearing exercise activities. [x]  Signs, symptoms, and treatment of hyperglycemia and hypoglycemia: Discussed high blood glucose in detail with patient.      [x] Prevention, recognition and treatment of hyperglycemia and hypoglycemia: Discussed low blood glucose less than 70 in detail with patient. [x]  Importance of blood glucose monitoring  [x] Blood Glucose targets   []   Provided patient with blood glucose meter  [x]  Has glucometer and supplies at home   []  Instruction on use of the blood glucose meter and recommended monitoring schedule   [x]  Discuss the importance of HbA1C monitoring. Patients A1c is (no lab report available at time of this review) . This is equivalent to average glucose of ___ mg/dl for the past 2-3 months. Requested A1c lab on 9/28/2021 and pending result. []  Sick day guidelines   []  Proper use and disposal of lancets, needles, syringes or insulin pens (if appropriate)   []  Potential long-term complications (retinopathy, kidney disease, neuropathy, foot care)   [x] Information about whom to contact in case of emergency or for more information    [x]  Goal:  Patient/family will demonstrate understanding of Diabetes Self- Management Skills by: 10/05/2021  Plan for post-discharge education or self-management support:    [] Outpatient class schedule provided            [] Patient Declined    [] Scheduled for outpatient classes (date) _______    [x] Written information provided  Verify: [x] Prior to admission Diabetes medications    Does patient understand how diabetes medications work? Yes. Does patient have difficulty obtaining diabetes medications and testing supplies?  No.    Darshan Mcleod RN [facility]  Pager: [phone]

## 2021-09-28 NOTE — PROGRESS NOTES
[address]  [address] [place] [address], [address]     INPATIENT REHABILITATION  DAILY PROGRESS NOTE     Date: 9/28/2021    Name: Elisa Or Age / Sex: 60 y.o. / male   CSN: [id number] MRN: [id number]   [facility] Date: 9/18/2021 Length of Stay: 10 days     Primary Rehab Diagnosis: Impaired Mobility and ADLs secondary to:  1. Diabetic infection of right foot  2. S/P Incision and drainage, with decompression of multiple subfascial layers, right foot; Wound debridement, skin, soft tissue, and muscle, right foot (9/9/2021 - Dr. Torrey Lomeli)  3.  S/P Incision and drainage of right foot (9/4/2021 - Dr. Ardena Moritz.)      Subjective:     Patient is sitting in bed in no apparent distress, awake and alert  Objective:     Vital Signs:  Patient Vitals for the past 24 hrs:   BP Temp Pulse Resp SpO2   09/28/21 1546 117/68 98.7 °F (37.1 °C) 80 18 100 %   09/28/21 0727 (!) 140/82 98.8 °F (37.1 °C) 88 19 100 %   09/27/21 2200 (!) 141/78 98.4 °F (36.9 °C) 81 18    09/27/21 2107 (!) 141/78 98.4 °F (36.9 °C) 81 18 99 %        Physical Examination:  General:  Awake, alert  Cardiovascular:  S1S2+, RRR  Pulmonary:  CTA b/l  GI:  Soft, BS+, NT, ND  Extremities:  No edema  Right foot with wound VAC in place         Current Medications:  Current Facility-Administered Medications   Medication Dose Route Frequency    insulin glargine (LANTUS) injection 16 Units  16 Units SubCUTAneous QHS    insulin lispro (HUMALOG) injection 2 Units  2 Units SubCUTAneous TIDAC    metoprolol tartrate (LOPRESSOR) tablet 50 mg  50 mg Oral Q12H    polyethylene glycol (MIRALAX) packet 17 g  17 g Oral DAILY    insulin lispro (HUMALOG) injection   SubCUTAneous TIDAC    heparin (porcine) injection 5,000 Units  5,000 Units SubCUTAneous Q8H    lisinopriL (PRINIVIL, ZESTRIL) tablet 20 mg  20 mg Oral DAILY    methIMAzole (TAPAZOLE) tablet 10 mg  10 mg Oral DAILY    oxyCODONE-acetaminophen (PERCOCET) 5-325 mg per tablet 1-2 Tablet  1-2 Tablet Oral Q6H PRN    acetaminophen (TYLENOL) tablet 650 mg  650 mg Oral Q4H PRN    cholecalciferol (VITAMIN D3) capsule 5,000 Units  5,000 Units Oral DAILY    famotidine (PEPCID) tablet 20 mg  20 mg Oral BID    docusate sodium (COLACE) capsule 100 mg  100 mg Oral BID    magnesium hydroxide (MILK OF MAGNESIA) 400 mg/5 mL oral suspension 30 mL  30 mL Oral DAILY PRN    bisacodyL (DULCOLAX) tablet 10 mg  10 mg Oral Q48H PRN    glucose chewable tablet 16 g  4 Tablet Oral PRN    glucagon (GLUCAGEN) injection 1 mg  1 mg IntraMUSCular PRN    dextrose (D50W) injection syrg 12.5-25 g  25-50 mL IntraVENous PRN    hydrALAZINE (APRESOLINE) tablet 25 mg  25 mg Oral Q8H PRN       Allergies:  No Known Allergies    Lab/Data Review:  Recent Results (from the past 24 hour(s))   GLUCOSE, POC    Collection Time: 09/27/21  8:46 PM   Result Value Ref Range    Glucose (POC) 105 70 - 110 mg/dL   GLUCOSE, POC    Collection Time: 09/28/21  7:36 AM   Result Value Ref Range    Glucose (POC) 220 (H) 70 - 110 mg/dL   GLUCOSE, POC    Collection Time: 09/28/21 12:08 PM   Result Value Ref Range    Glucose (POC) 177 (H) 70 - 110 mg/dL   HEMOGLOBIN A1C WITH EAG    Collection Time: 09/28/21  5:00 PM   Result Value Ref Range    Hemoglobin A1c 8.3 (H) 4.2 - 5.6 %    Est. average glucose 192 mg/dL   GLUCOSE, POC    Collection Time: 09/28/21  5:11 PM   Result Value Ref Range    Glucose (POC) 73 70 - 110 mg/dL   GLUCOSE, POC    Collection Time: 09/28/21  5:34 PM   Result Value Ref Range    Glucose (POC) 87 70 - 110 mg/dL       Assessment:     Primary Rehabilitation Diagnosis  1. Impaired Mobility and ADLs  2. Diabetic infection of right foot  2. S/P Incision and drainage, with decompression of multiple subfascial layers, right foot; Wound debridement, skin, soft tissue, and muscle, right foot (9/9/2021 - Dr. Johanna Mcmanus)  3.  S/P Incision and drainage of right foot (9/4/2021 - Dr. Amado Rosenberg, Yuriy Angelo.)    Comorbidities  Patient Active Problem List   Diagnosis Code    Diabetic infection of right foot ([facility] E11.628, L08.9    Essential hypertension I10    Vitamin D insufficiency E55.9    Impaired mobility and ADLs Z74.09, Z78.9    Type 2 diabetes mellitus without complication, with long-term current use of insulin ([facility] E11.9, Z79.4    COVID-19 ruled out by laboratory testing Z20.822    Status post incision and drainage Z98.890    History of incision and drainage Z98.890    Hyperthyroidism E05.90       Plan:     1. Justification for continued stay: Good progression towards established rehabilitation goals. 2. Medical Issues being followed closely:    [x]  Fall and safety precautions     [x]  Wound Care     [x]  Bowel and Bladder Function     [x]  Fluid Electrolyte and Nutrition Balance     [x]  Pain Control      3. Issues that 24 hour rehabilitation nursing is following:    [x]  Fall and safety precautions     [x]  Wound Care     [x]  Bowel and Bladder Function     [x]  Fluid Electrolyte and Nutrition Balance     [x]  Pain Control      [x]  Assistance with and education on in-room safety with transfers to and from the bed, wheelchair, toilet and shower. 4. Acute rehabilitation plan of care:    [x]  Continue current care and rehab. [x]  Physical Therapy           [x]  Occupational Therapy           []  Speech Therapy     []  Hold Rehab until further notice     5. Medications:    [x]  MAR Reviewed     [x]  Continue Present Medications     6. DVT Prophylaxis:      []  Enoxaparin     [x]  Unfractionated Heparin     []  Warfarin     []  NOAC     []  WYATT Stockings     []  Sequential Compression Device     []  None     7. Code status    [x]  Full code     []  Partial code     []  Do not intubate     []  Do not resuscitate     8. Orders:   > Diabetic infection of right foot; S/P Incision and drainage, with decompression of multiple subfascial layers, right foot;  Wound debridement, skin, soft tissue, and muscle, right foot (9/9/2021 - Dr. Kati Boss); S/P Incision and drainage of right foot (9/4/2021 - Dr. Naresh Venegas.)   > Nonweightbearing on the right foot   > Wound care nurse consult for WoundVac management   > Wound care instructions: Staff nurse to perform wound vac dressing changes Monday, Wednesday and fridays. Remove old dressing from right plantar foot wound and clean site  with wound spray. Apply skin prep  followed barrier ring then drapes. Cut hole over wound, apply adaptic  to wound bed then pack wound with black foam. Applied drapes  followed by diskus.  Settings 125mmhg continuous. > Complete course of Keflex    > Essential hypertension   > On metoprolol and lisinopril and hydralazine    > Hyperthyroidism   > Continue methimazole    > Type 2 diabetes mellitus without complication, with long-term current use of insulin   > Lighting scale insulin. Lantus    > Vitamin D insufficiency   Supplement    > Analgesia   > Tylenol, Percocet as needed    > Diet:   > Diabetic  Bowel regimen    9. Personal Protective Equipment ([id number] face mask) was used while interacting with the patient. Patient was using a surgical mask.     Discussed with patient    Signed:    Fred Caceres MD      September 28, 2021

## 2021-09-28 NOTE — PROGRESS NOTES
Problem: Pressure Injury - Risk of  Goal: *Prevention of pressure injury  Description: Document Sarabjit Scale and appropriate interventions in the flowsheet.   Outcome: Progressing Towards Goal  Note: Pressure Injury Interventions:  Sensory Interventions: Assess changes in LOC         Activity Interventions: Chair cushion    Mobility Interventions: Chair cushion    Nutrition Interventions: Document food/fluid/supplement intake    Friction and Shear Interventions: Feet elevated on foot rest

## 2021-09-28 NOTE — PROGRESS NOTES
Problem: Self Care Deficits Care Plan (Adult)  Goal: *Acute Goals and Plan of Care (Insert Text)  Description: Occupational Therapy Goals   Long Term Goals  Initiated 2021 and to be accomplished within 3 week(s), by 10/10/2021. 1. Pt will perform self-feeding with I.  2. Pt will perform grooming with I.  3. Pt will perform UB bathing with Mod I and use of AE PRN. 4. Pt will perform LB bathing with Mod I and use of AE PRN. 5. Pt will perform tub/shower transfer with supv and use of LRAD. 6. Pt will perform UB dressing with Mod I.  7. Pt will perform LB dressing with supv and use of AE PRN. 8. Pt will perform toileting task with Mod I.  9. Pt will perform toilet transfer with supv and use of LRAD. Short Term Goals   Initiated 2021 and to be accomplished within 7 day(s), updated 2021. 1. Pt will perform self-feeding with Mod I. ( 2021)  2. Pt will perform grooming with set-up. ( 2021)  3. Pt will perform UB bathing with SBA and use of AE PRN. ( 2021)  4. Pt will perform LB bathing with Min A and use of AE PRN. ( 2021)  5. Pt will perform tub/shower transfer with Min A and use of LRAD. 6. Pt will perform UB dressing with SBA. ( 2021)  7. Pt will perform LB dressing with Mod A and use of AE PRN. ( 2021)  8. Pt will perform toileting task with Mod A. ( 2021)  9. Pt will perform toilet transfer with CGA and use of LRAD. ( 2021)  Occupational Therapy TREATMENT    Patient: Alli Jensen   60 y.o. Patient identified with name and : Yes    Date: 2021    First Tx Session  Time In: 0730  Time Out[de-identified] 0900    Diagnosis: Diabetic foot infection ([facility] [E11.628, L08.9]   Precautions: Fall, NWB  Chart, occupational therapy assessment, plan of care, and goals were reviewed. Pain:  Pt reports 1/10 pain or discomfort prior to treatment. Pt reports 1/10 pain or discomfort post treatment.    Intervention Provided: N/A      SUBJECTIVE: Patient stated I am going to the bathroom.  referring to where bathing will occur at home. OBJECTIVE DATA SUMMARY:     FEEDING/EATING Daily Assessment    Feeding/Eating  Feeding/Eating Assistance: 7 (Independent)     GROOMING Daily Assessment    Grooming  Grooming Assistance : 5 (Supervision) (setup at sink)  Comments: Pt washed and shaved face seated at sink     [address] Daily Assessment    Upper Body Bathing  Bathing Assistance, Upper: 5 (Supervision) (setup at sink)  Position Performed: Seated in chair  Comments: Pt washed all areas sitting at sink     [place] Daily Assessment    [address], Lower : 5 (Stand-by assistance) (Pt used leaned side to side wash buttock and kim)  Position Performed: Seated in chair;Standing  Adaptive Equipment: [address] Daily Assessment    Upper Body Dressing   Dressing Assistance : 5 (Supervision) (setup at sink)  Comments: Pt doffed/donned shirt     LOWER BODY DRESSING Daily Assessment    Lower Body Dressing   Dressing Assistance : 5 (Stand-by assistance)  Leg Crossed Method Used: No  Position Performed: Seated in chair;Standing  Adaptive Equipment Used: Walker  Comments: Pt bend forward to thread BLE through     MOBILITY/TRANSFERS Daily Assessment     Sit to stand with CGA for stability for CM standing at sink with RW.        ASSESSMENT:  Pt increasing BUE strength and balance for ADLs. Pt to continue POC. Pt/wife noted to have plans for A ramp installed before discharge. Progression toward goals:  [x]          Improving appropriately and progressing toward goals  []          Improving slowly and progressing toward goals  []          Not making progress toward goals and plan of care will be adjusted     PLAN:  Patient continues to benefit from skilled intervention to address the above impairments. Continue treatment per established plan of care.   Discharge Recommendations:  Home Health with asst  Further Equipment Recommendations for Discharge:  bedside commode, transfer bench, and N/A     Activity Tolerance:  Fair      Estimated LOS:    Please refer to the flowsheet for vital signs taken during this treatment. After treatment:   []  Patient left in no apparent distress sitting up in chair   []  Patient left in no apparent distress in bed  []  Call bell left within reach  []  Nursing notified  []  Caregiver present  []  Bed alarm activated    COMMUNICATION/EDUCATION:   [] Home safety education was provided and the patient/caregiver indicated understanding. [] Patient/family have participated as able in goal setting and plan of care. [] Patient/family agree to work toward stated goals and plan of care. [] Patient understands intent and goals of therapy, but is neutral about his/her participation. [] Patient is unable to participate in goal setting and plan of care.       Hector Garcia OT     Outcome: Progressing Towards Goal  Goal: Interventions  Outcome: Progressing Towards Goal

## 2021-09-28 NOTE — REHAB NOTE
Pt had episode of hypoglycemia bg=74. Pt asymptomatic. No distress or discomfort noted. Gave pt 4oz juice.      Rechecked bg=87

## 2021-09-29 LAB
GLUCOSE BLD STRIP.AUTO-MCNC: 119 MG/DL (ref 70–110)
GLUCOSE BLD STRIP.AUTO-MCNC: 146 MG/DL (ref 70–110)
GLUCOSE BLD STRIP.AUTO-MCNC: 75 MG/DL (ref 70–110)
GLUCOSE BLD STRIP.AUTO-MCNC: 85 MG/DL (ref 70–110)
GLUCOSE BLD STRIP.AUTO-MCNC: 85 MG/DL (ref 70–110)

## 2021-09-29 PROCEDURE — 97110 THERAPEUTIC EXERCISES: CPT

## 2021-09-29 PROCEDURE — 74011636637 HC RX REV CODE- 636/637: Performed by: EMERGENCY MEDICINE

## 2021-09-29 PROCEDURE — 77030025414 HC DRSG VAC ASST SMPLC KCON -B

## 2021-09-29 PROCEDURE — 99232 SBSQ HOSP IP/OBS MODERATE 35: CPT | Performed by: EMERGENCY MEDICINE

## 2021-09-29 PROCEDURE — 97116 GAIT TRAINING THERAPY: CPT

## 2021-09-29 PROCEDURE — 97530 THERAPEUTIC ACTIVITIES: CPT

## 2021-09-29 PROCEDURE — 82962 GLUCOSE BLOOD TEST: CPT

## 2021-09-29 PROCEDURE — 74011250637 HC RX REV CODE- 250/637: Performed by: INTERNAL MEDICINE

## 2021-09-29 PROCEDURE — 74011250636 HC RX REV CODE- 250/636: Performed by: INTERNAL MEDICINE

## 2021-09-29 PROCEDURE — 74011250637 HC RX REV CODE- 250/637: Performed by: FAMILY MEDICINE

## 2021-09-29 PROCEDURE — 2709999900 HC NON-CHARGEABLE SUPPLY

## 2021-09-29 PROCEDURE — 74011636637 HC RX REV CODE- 636/637: Performed by: FAMILY MEDICINE

## 2021-09-29 PROCEDURE — 65310000000 HC RM PRIVATE REHAB

## 2021-09-29 PROCEDURE — 74011250637 HC RX REV CODE- 250/637: Performed by: EMERGENCY MEDICINE

## 2021-09-29 RX ORDER — INSULIN GLARGINE 100 [IU]/ML
14 INJECTION, SOLUTION SUBCUTANEOUS
Status: DISCONTINUED | OUTPATIENT
Start: 2021-09-29 | End: 2021-09-30

## 2021-09-29 RX ADMIN — DOCUSATE SODIUM 100 MG: 100 CAPSULE, LIQUID FILLED ORAL at 09:26

## 2021-09-29 RX ADMIN — HEPARIN SODIUM 5000 UNITS: 5000 INJECTION INTRAVENOUS; SUBCUTANEOUS at 09:27

## 2021-09-29 RX ADMIN — INSULIN GLARGINE 14 UNITS: 100 INJECTION, SOLUTION SUBCUTANEOUS at 21:27

## 2021-09-29 RX ADMIN — POLYETHYLENE GLYCOL 3350 17 G: 17 POWDER, FOR SOLUTION ORAL at 09:27

## 2021-09-29 RX ADMIN — HEPARIN SODIUM 5000 UNITS: 5000 INJECTION INTRAVENOUS; SUBCUTANEOUS at 01:19

## 2021-09-29 RX ADMIN — Medication 5000 UNITS: at 09:26

## 2021-09-29 RX ADMIN — FAMOTIDINE 20 MG: 20 TABLET, FILM COATED ORAL at 09:27

## 2021-09-29 RX ADMIN — INSULIN LISPRO 2 UNITS: 100 INJECTION, SOLUTION INTRAVENOUS; SUBCUTANEOUS at 09:12

## 2021-09-29 RX ADMIN — LISINOPRIL 20 MG: 20 TABLET ORAL at 09:26

## 2021-09-29 RX ADMIN — INSULIN LISPRO 2 UNITS: 100 INJECTION, SOLUTION INTRAVENOUS; SUBCUTANEOUS at 17:47

## 2021-09-29 RX ADMIN — METOPROLOL TARTRATE 50 MG: 50 TABLET, FILM COATED ORAL at 09:26

## 2021-09-29 RX ADMIN — METHIMAZOLE 10 MG: 10 TABLET ORAL at 09:26

## 2021-09-29 RX ADMIN — DOCUSATE SODIUM 100 MG: 100 CAPSULE, LIQUID FILLED ORAL at 18:59

## 2021-09-29 RX ADMIN — FAMOTIDINE 20 MG: 20 TABLET, FILM COATED ORAL at 18:59

## 2021-09-29 RX ADMIN — HEPARIN SODIUM 5000 UNITS: 5000 INJECTION INTRAVENOUS; SUBCUTANEOUS at 17:53

## 2021-09-29 RX ADMIN — METOPROLOL TARTRATE 50 MG: 50 TABLET, FILM COATED ORAL at 21:22

## 2021-09-29 NOTE — ROUTINE PROCESS
0800 Pt. Awake sitting up in bed no change in assessment pt. Reported to be feeling fine. 0930 Pt. Sitting up in chair eating breakfast.  1200 with therapy. 1330 able to transfer from bed to chair with min assist. Wound vac dressing intact. 1500 no change in assessment  1800 Pt. Sitting up in bed eating dinner.

## 2021-09-29 NOTE — PROGRESS NOTES
[address]  [address] [place] [address], [address]     INPATIENT REHABILITATION  DAILY PROGRESS NOTE     Date: 9/29/2021    Name: Leon Garrison Age / Sex: 60 y.o. / male   CSN: [id number] MRN: [id number]   Govind Noss Date: 9/18/2021 Length of Stay: 11 days     Primary Rehab Diagnosis: Impaired Mobility and ADLs secondary to:  1. Diabetic infection of right foot  2. S/P Incision and drainage, with decompression of multiple subfascial layers, right foot; Wound debridement, skin, soft tissue, and muscle, right foot (9/9/2021 - Dr. Brayden Buckley)  3. S/P Incision and drainage of right foot (9/4/2021 - Dr. Jose Aguilar.)      Subjective:     Patient is sitting in bed in no apparent distress, awake, follows commands.   Denies any discomfort    Objective:     Vital Signs:  Patient Vitals for the past 24 hrs:   BP Temp Pulse Resp SpO2   09/29/21 0723 116/65 97.7 °F (36.5 °C) 90 16 99 %   09/28/21 2120 128/69 97.8 °F (36.6 °C) 82 17 99 %   09/28/21 1546 117/68 98.7 °F (37.1 °C) 80 18 100 %        Physical Examination:  General:  Awake, alert  Cardiovascular:  S1S2+, RRR  Pulmonary:  CTA b/l  GI:  Soft, BS+, NT, ND  Extremities:  No edema  Right foot with wound VAC in place         Current Medications:  Current Facility-Administered Medications   Medication Dose Route Frequency    insulin glargine (LANTUS) injection 14 Units  14 Units SubCUTAneous QHS    insulin lispro (HUMALOG) injection 2 Units  2 Units SubCUTAneous TIDAC    metoprolol tartrate (LOPRESSOR) tablet 50 mg  50 mg Oral Q12H    polyethylene glycol (MIRALAX) packet 17 g  17 g Oral DAILY    insulin lispro (HUMALOG) injection   SubCUTAneous TIDAC    heparin (porcine) injection 5,000 Units  5,000 Units SubCUTAneous Q8H    lisinopriL (PRINIVIL, ZESTRIL) tablet 20 mg  20 mg Oral DAILY    methIMAzole (TAPAZOLE) tablet 10 mg  10 mg Oral DAILY    oxyCODONE-acetaminophen (PERCOCET) 5-325 mg per tablet 1-2 Tablet  1-2 Tablet Oral Q6H PRN    acetaminophen (TYLENOL) tablet 650 mg  650 mg Oral Q4H PRN    cholecalciferol (VITAMIN D3) capsule 5,000 Units  5,000 Units Oral DAILY    famotidine (PEPCID) tablet 20 mg  20 mg Oral BID    docusate sodium (COLACE) capsule 100 mg  100 mg Oral BID    magnesium hydroxide (MILK OF MAGNESIA) 400 mg/5 mL oral suspension 30 mL  30 mL Oral DAILY PRN    bisacodyL (DULCOLAX) tablet 10 mg  10 mg Oral Q48H PRN    glucose chewable tablet 16 g  4 Tablet Oral PRN    glucagon (GLUCAGEN) injection 1 mg  1 mg IntraMUSCular PRN    dextrose (D50W) injection syrg 12.5-25 g  25-50 mL IntraVENous PRN    hydrALAZINE (APRESOLINE) tablet 25 mg  25 mg Oral Q8H PRN       Allergies:  No Known Allergies    Lab/Data Review:  Recent Results (from the past 24 hour(s))   HEMOGLOBIN A1C WITH EAG    Collection Time: 09/28/21  5:00 PM   Result Value Ref Range    Hemoglobin A1c 8.3 (H) 4.2 - 5.6 %    Est. average glucose 192 mg/dL   GLUCOSE, POC    Collection Time: 09/28/21  5:11 PM   Result Value Ref Range    Glucose (POC) 73 70 - 110 mg/dL   GLUCOSE, POC    Collection Time: 09/28/21  5:34 PM   Result Value Ref Range    Glucose (POC) 87 70 - 110 mg/dL   GLUCOSE, POC    Collection Time: 09/28/21  9:17 PM   Result Value Ref Range    Glucose (POC) 194 (H) 70 - 110 mg/dL   GLUCOSE, POC    Collection Time: 09/29/21  9:02 AM   Result Value Ref Range    Glucose (POC) 85 70 - 110 mg/dL   GLUCOSE, POC    Collection Time: 09/29/21 12:02 PM   Result Value Ref Range    Glucose (POC) 75 70 - 110 mg/dL   GLUCOSE, POC    Collection Time: 09/29/21 12:21 PM   Result Value Ref Range    Glucose (POC) 85 70 - 110 mg/dL       Assessment:     Primary Rehabilitation Diagnosis  1. Impaired Mobility and ADLs  2. Diabetic infection of right foot  2. S/P Incision and drainage, with decompression of multiple subfascial layers, right foot; Wound debridement, skin, soft tissue, and muscle, right foot (9/9/2021 - Dr. Kim Smith)  3.  S/P Incision and drainage of right foot (9/4/2021 - Dr. Collins Query.)    Comorbidities  Patient Active Problem List   Diagnosis Code    Diabetic infection of right foot ([facility] E11.628, L08.9    Essential hypertension I10    Vitamin D insufficiency E55.9    Impaired mobility and ADLs Z74.09, Z78.9    Type 2 diabetes mellitus without complication, with long-term current use of insulin ([facility] E11.9, Z79.4    COVID-19 ruled out by laboratory testing Z20.822    Status post incision and drainage Z98.890    History of incision and drainage Z98.890    Hyperthyroidism E05.90       Plan:     1. Justification for continued stay: Good progression towards established rehabilitation goals. 2. Medical Issues being followed closely:    [x]  Fall and safety precautions     [x]  Wound Care     [x]  Bowel and Bladder Function     [x]  Fluid Electrolyte and Nutrition Balance     [x]  Pain Control      3. Issues that 24 hour rehabilitation nursing is following:    [x]  Fall and safety precautions     [x]  Wound Care     [x]  Bowel and Bladder Function     [x]  Fluid Electrolyte and Nutrition Balance     [x]  Pain Control      [x]  Assistance with and education on in-room safety with transfers to and from the bed, wheelchair, toilet and shower. 4. Acute rehabilitation plan of care:    [x]  Continue current care and rehab. [x]  Physical Therapy           [x]  Occupational Therapy           []  Speech Therapy     []  Hold Rehab until further notice     5. Medications:    [x]  MAR Reviewed     [x]  Continue Present Medications     6. DVT Prophylaxis:      []  Enoxaparin     [x]  Unfractionated Heparin     []  Warfarin     []  NOAC     []  WYATT Stockings     []  Sequential Compression Device     []  None     7. Code status    [x]  Full code     []  Partial code     []  Do not intubate     []  Do not resuscitate     8.  Orders:   > Diabetic infection of right foot; S/P Incision and drainage, with decompression of multiple subfascial layers, right foot; Wound debridement, skin, soft tissue, and muscle, right foot (9/9/2021 - Dr. Jose Baez); S/P Incision and drainage of right foot (9/4/2021 - Dr. Danie Dominguez)   > Nonweightbearing on the right foot   > Wound care nurse consult for WoundVac management   > Wound care instructions: Staff nurse to perform wound vac dressing changes Monday, Wednesday and fridays. Remove old dressing from right plantar foot wound and clean site  with wound spray. Apply skin prep  followed barrier ring then drapes. Cut hole over wound, apply adaptic  to wound bed then pack wound with black foam. Applied drapes  followed by diskus.  Settings 125mmhg continuous. > Patient has completed a course of Keflex    > Essential hypertension   > Continue metoprolol, lisinopril, hydralazine    > Hyperthyroidism   > On methimazole    > Type 2 diabetes mellitus without complication, with long-term current use of insulin   > Decrease Lantus, sliding scale, prandial insulin with hold parameters    > Vitamin D insufficiency   Supplement    > Analgesia   > Tylenol, Percocet as needed    > Diet:   > Diabetic  Bowel regimen    9. Personal Protective Equipment ([id number] face mask) was used while interacting with the patient. Patient was using a surgical mask. Discussed with patient. Discussed with patient's wife yesterday.   Discussed with RN    Signed:    Ron Trujillo MD      September 29, 2021

## 2021-09-29 NOTE — ROUTINE PROCESS
SHIFT CHANGE NOTE FOR [facility]    Bedside and Verbal shift change report given to Be Walton, RN (oncoming nurse) by Christen Jolly RN (offgoing nurse). Report included the following information SBAR, Kardex, MAR and Recent Results. Situation:   Code Status: Full Code   Hospital Day: 11   Problem List:   Hospital Problems  Date Reviewed: 6/10/2021        Codes Class Noted POA    Essential hypertension (Chronic) ICD-10-CM: I10  ICD-9-CM: 401.9  Unknown Yes        Type 2 diabetes mellitus without complication, with long-term current use of insulin (HCC) (Chronic) ICD-10-CM: E11.9, Z79.4  ICD-9-CM: 250.00, V58.67  Unknown Yes    Overview Signed 9/20/2021 11:13 PM by Marty Rico MD     HbA1c (9/3/2021) = 10.8             Hyperthyroidism (Chronic) ICD-10-CM: E05.90  ICD-9-CM: 242.90  Unknown Yes        Vitamin D insufficiency (Chronic) ICD-10-CM: E55.9  ICD-9-CM: 268.9  9/19/2021 Yes    Overview Signed 9/20/2021 11:11 PM by Marty Rico MD     Vitamin D 25-Hydroxy (9/19/2021) = 24.0             Status post incision and drainage ICD-10-CM: Z98.890  ICD-9-CM: V45.89  9/9/2021 Yes    Overview Signed 9/20/2021 11:18 PM by Marty Rico MD     S/P Incision and drainage, with decompression of multiple subfascial layers, right foot; Wound debridement, skin, soft tissue, and muscle, right foot (9/9/2021 - Dr. Grover Pate)             MKFCS-60 ruled out by laboratory testing ICD-10-CM: Z20.822  ICD-9-CM: V01.79  9/8/2021 Yes    Overview Signed 9/20/2021 11:14 PM by Marty Rico MD     SARS-CoV-2 ([address] [place] [address]) (9/8/2021):  Not detected             History of incision and drainage ICD-10-CM: Z98.890  ICD-9-CM: V45.89  9/4/2021 Yes    Overview Signed 9/20/2021 11:18 PM by Marty Rico MD     S/P Incision and drainage of right foot (9/4/2021 - Dr. Ethan Litten.)             * (Principal) Diabetic infection of right foot [facility]) ICD-10-CM: E11.628, L08.9  ICD-9-CM: 250.80, 686.9  9/2/2021 Yes Impaired mobility and ADLs ICD-10-CM: Z74.09, Z78.9  ICD-9-CM: V49.89  9/2/2021 Yes              Background:   Past Medical History:   Past Medical History:   Diagnosis Date    COVID-19 ruled out by laboratory testing 9/8/2021    [facility] (9/8/2021):  Not detected    Diabetic infection of right foot ([address] 9/2/2021    Essential hypertension     Hyperthyroidism     Type 2 diabetes mellitus without complication, with long-term current use of insulin ([facility])     HbA1c (9/3/2021) = 10.8    Vitamin D insufficiency 9/19/2021    Vitamin D 25-Hydroxy (9/19/2021) = 24.0        Assessment:   Changes in Assessment throughout shift: No change to previous assessment     Patient has a central line: no Reasons if yes: na  Insertion date:na Last dressing date:na   Patient has Dennis Cath: no Reasons if yes: na   Insertion date:na  Shift dennis care completed: NO     Last Vitals:     Vitals:    09/28/21 1546 09/28/21 2120 09/29/21 0723 09/29/21 1551   BP: 117/68 128/69 116/65 128/71   Pulse: 80 82 90 79   Resp: 18 17 16 16   Temp: 98.7 °F (37.1 °C) 97.8 °F (36.6 °C) 97.7 °F (36.5 °C) 97.8 °F (36.6 °C)   SpO2: 100% 99% 99% 100%   Weight:       Height:            PAIN    Pain Assessment    Pain Intensity 1: 0 (09/29/21 1551) Pain Intensity 1: 2 (12/29/14 1105)    Pain Location 1: Foot Pain Location 1: Abdomen    Pain Intervention(s) 1:  (premedicated for dressing change) Pain Intervention(s) 1: Medication (see MAR)  Patient Stated Pain Goal: 0 Patient Stated Pain Goal: 0  o Intervention effective: yes  o Other actions taken for pain:       Skin Assessment  Skin color Skin Color: Appropriate for ethnicity  Condition/Temperature Skin Condition/Temp: Dry, Warm  Integrity Skin Integrity: Wound (add Wound LDA)  Turgor Turgor: Non-tenting  Weekly Pressure Ulcer Documentation  Pressure  Injury Documentation: No Pressure Injury Noted-Pressure Ulcer Prevention Initiated  Wound Prevention & Protection Methods  Orientation of wound Orientation of Wound Prevention: Posterior  Location of Prevention Location of Wound Prevention: Sacrum/Coccyx  Dressing Present Dressing Present : No  Dressing Status    Wound Offloading Wound Offloading (Prevention Methods): Bed, pressure reduction mattress     INTAKE/OUPUT  Date 09/28/21 1900 - 09/29/21 0659 09/29/21 0700 - 09/30/21 0659   Shift 5291-5220 24 Hour Total 6859-2673 9264-7365 24 Hour Total   INTAKE   P.O.   1100     P. O.   1100   Shift Total(mL/kg) 250(2.6) 730(7.6) 1100(11.5)  1100(11.5)   OUTPUT   Urine(mL/kg/hr)          Urine Occurrence(s) 1 x 4 x 3 x  3 x   Emesis/NG output          Emesis Occurrence(s) 0 x 0 x 0 x  0 x   Stool          Stool Occurrence(s) 0 x 0 x 0 x  0 x   Shift Total(mL/kg)         730 2493  1100   Weight (kg) 96 96 96 96 96       Recommendations:  1. Patient needs and requests: na    2. Pending tests/procedures: na     3. Functional Level/Equipment:   /      Fall Precautions:   Fall risk precautions were reinforced with the patient; he was instructed to call for help prior to getting up. The following fall risk precautions were continued: bed/ chair alarms, door signage, yellow bracelet and socks as well as update of the Cathlene Loron tool in the patient's room. Willy Score: 3    HEALS Safety Check    A safety check occurred in the patient's room between off going nurse and oncoming nurse listed above.     The safety check included the below items  Area Items   H  High Alert Medications - Verify all high alert medication drips (heparin, PCA, etc.)   E  Equipment - Suction is set up for ALL patients (with yanker)  - Red plugs utilized for all equipment (IV pumps, etc.)  - WOWs wiped down at end of shift.  - Room stocked with oxygen, suction, and other unit-specific supplies   A  Alarms - Bed alarm is set for fall risk patients  - Ensure chair alarm is in place and activated if patient is up in a chair   L  Lines - Check IV for any infiltration  - Bal bag is empty if patient has a Bal   - Tubing and IV bags are labeled   S  Safety   - Room is clean, patient is clean, and equipment is clean. - Hallways are clear from equipment besides carts. - Fall bracelet on for fall risk patients  - Ensure room is clear and free of clutter  - Suction is set up for ALL patients (with yanker)  - Hallways are clear from equipment besides carts.    - Isolation precautions followed, supplies available outside room, sign posted     Corrine Bonds RN

## 2021-09-29 NOTE — PROGRESS NOTES
Problem: Self Care Deficits Care Plan (Adult)  Goal: *Acute Goals and Plan of Care (Insert Text)  Description: Occupational Therapy Goals   Long Term Goals  Initiated 2021 and to be accomplished within 3 week(s), by 10/10/2021. 1. Pt will perform self-feeding with I.  2. Pt will perform grooming with I.  3. Pt will perform UB bathing with Mod I and use of AE PRN. 4. Pt will perform LB bathing with Mod I and use of AE PRN. 5. Pt will perform tub/shower transfer with supv and use of LRAD. 6. Pt will perform UB dressing with Mod I.  7. Pt will perform LB dressing with supv and use of AE PRN. 8. Pt will perform toileting task with Mod I.  9. Pt will perform toilet transfer with supv and use of LRAD. Short Term Goals   Initiated 2021 and to be accomplished within 7 day(s), updated 2021. 1. Pt will perform self-feeding with Mod I. ( 2021)  2. Pt will perform grooming with set-up. ( 2021)  3. Pt will perform UB bathing with SBA and use of AE PRN. ( 2021)  4. Pt will perform LB bathing with Min A and use of AE PRN. ( 2021)  5. Pt will perform tub/shower transfer with Min A and use of LRAD. 6. Pt will perform UB dressing with SBA. ( 2021)  7. Pt will perform LB dressing with Mod A and use of AE PRN. ( 2021)  8. Pt will perform toileting task with Mod A. ( 2021)  9. Pt will perform toilet transfer with CGA and use of LRAD. ( 2021)  Occupational Therapy TREATMENT    Patient: Chetan Sexton   60 y.o. Patient identified with name and : Yes    Date: 2021    First Tx Session  Time In: 1000  Time Out[de-identified] 1130    Diagnosis: Diabetic foot infection ([facility] [E11.628, L08.9]   Precautions: Fall, NWB  Chart, occupational therapy assessment, plan of care, and goals were reviewed. Pain:  No pain reported    SUBJECTIVE:   Patient stated I have to make some phone calls.  regarding ramp.     OBJECTIVE DATA SUMMARY:     THERAPEUTIC EXERCISE Daily Assessment    To increase strength for ADLs: Pt propelled gym from room <.gym with I. UB bike up to 10 minutes with Max resistance. Instruct pt. in home exercise program: UB HEP (chess press, chest pulls, butterfly wings, front raise,upright rows, overhead press, tricep extension) 3x10 using 3lb weights in hand. Handout given. Chair raises 2x15    Theraflex bar (green) supination, pronation and wrist extension/flexion 2x15     THERAPEUTIC ACTIVITY Daily Assessment    Perfection in standing to increase balance for ADLs. Pt used one hand on RW to place pieces in designated space, took pieces out, and reached to place pieces in basin at buttock level to simulate bathing task. Pt required CGA for balance and vcs to decrease pace of movement for better control. ASSESSMENT:  Pt working on increasing BUE strength for I ADLs. Progression toward goals:  [x]          Improving appropriately and progressing toward goals  []          Improving slowly and progressing toward goals  []          Not making progress toward goals and plan of care will be adjusted     PLAN:  Patient continues to benefit from skilled intervention to address the above impairments. Continue treatment per established plan of care. Discharge Recommendations:  Home Health with asst  Further Equipment Recommendations for Discharge:  bedside commode      Activity Tolerance:  Fair       Estimated LOS: 10/7/21    Please refer to the flowsheet for vital signs taken during this treatment. After treatment:   [x]  Patient left in no apparent distress sitting up in chair   []  Patient left in no apparent distress in bed  []  Call bell left within reach  []  Nursing notified  []  Caregiver present  []  Bed alarm activated    COMMUNICATION/EDUCATION:   [] Home safety education was provided and the patient/caregiver indicated understanding. [x] Patient/family have participated as able in goal setting and plan of care.   [] Patient/family agree to work toward stated goals and plan of care. [] Patient understands intent and goals of therapy, but is neutral about his/her participation. [] Patient is unable to participate in goal setting and plan of care.       Zack Sevilla, OT     Outcome: Progressing Towards Goal  Goal: Interventions  Outcome: Progressing Towards Goal

## 2021-09-29 NOTE — ROUTINE PROCESS
SHIFT CHANGE NOTE FOR [facility]    Bedside and Verbal shift change report given to [address] Kathryn Gaetano (oncoming nurse) by Debroa Wheeler RN (offgoing nurse). Report included the following information SBAR, Kardex, MAR and Recent Results. Situation:   Code Status: Full Code   Hospital Day: 11   Problem List:   Hospital Problems  Date Reviewed: 6/10/2021        Codes Class Noted POA    Essential hypertension (Chronic) ICD-10-CM: I10  ICD-9-CM: 401.9  Unknown Yes        Type 2 diabetes mellitus without complication, with long-term current use of insulin (HCC) (Chronic) ICD-10-CM: E11.9, Z79.4  ICD-9-CM: 250.00, V58.67  Unknown Yes    Overview Signed 9/20/2021 11:13 PM by Cristian Maher MD     HbA1c (9/3/2021) = 10.8             Hyperthyroidism (Chronic) ICD-10-CM: E05.90  ICD-9-CM: 242.90  Unknown Yes        Vitamin D insufficiency (Chronic) ICD-10-CM: E55.9  ICD-9-CM: 268.9  9/19/2021 Yes    Overview Signed 9/20/2021 11:11 PM by Cristian Maher MD     Vitamin D 25-Hydroxy (9/19/2021) = 24.0             Status post incision and drainage ICD-10-CM: Z98.890  ICD-9-CM: V45.89  9/9/2021 Yes    Overview Signed 9/20/2021 11:18 PM by Cristian Maher MD     S/P Incision and drainage, with decompression of multiple subfascial layers, right foot; Wound debridement, skin, soft tissue, and muscle, right foot (9/9/2021 - Dr. Zachery Leyden)             NWTVE-75 ruled out by laboratory testing ICD-10-CM: Z20.822  ICD-9-CM: V01.79  9/8/2021 Yes    Overview Signed 9/20/2021 11:14 PM by Cristian Maher MD     SARS-CoV-2 ([address] [place] [address]) (9/8/2021):  Not detected             History of incision and drainage ICD-10-CM: Z98.890  ICD-9-CM: V45.89  9/4/2021 Yes    Overview Signed 9/20/2021 11:18 PM by Cristian Maher MD     S/P Incision and drainage of right foot (9/4/2021 - Dr. Karina Tobias)             * (Principal) Diabetic infection of right foot [facility]) ICD-10-CM: E11.628, L08.9  ICD-9-CM: 250.80, 686.9  9/2/2021 Yes Impaired mobility and ADLs ICD-10-CM: Z74.09, Z78.9  ICD-9-CM: V49.89  9/2/2021 Yes              Background:   Past Medical History:   Past Medical History:   Diagnosis Date    COVID-19 ruled out by laboratory testing 9/8/2021    [facility] ([facility] (9/8/2021):  Not detected    Diabetic infection of right foot ([address] 9/2/2021    Essential hypertension     Hyperthyroidism     Type 2 diabetes mellitus without complication, with long-term current use of insulin ([facility])     HbA1c (9/3/2021) = 10.8    Vitamin D insufficiency 9/19/2021    Vitamin D 25-Hydroxy (9/19/2021) = 24.0        Assessment:   Changes in Assessment throughout shift: No change to previous assessment     Patient has a central line: no Reasons if yes: n/a  Insertion date: n/a Last dressing date: n/a   Patient has Bal Cath: no Reasons if yes: n/a    Insertion date: n/a     Last Vitals:     Vitals:    09/27/21 2200 09/28/21 0727 09/28/21 1546 09/28/21 2120   BP: (!) 141/78 (!) 140/82 117/68 128/69   Pulse: 81 88 80 82   Resp: 18 19 18 17   Temp: 98.4 °F (36.9 °C) 98.8 °F (37.1 °C) 98.7 °F (37.1 °C) 97.8 °F (36.6 °C)   SpO2:  100% 100% 99%   Weight:       Height:            PAIN    Pain Assessment    Pain Intensity 1: 0 (09/29/21 0400) Pain Intensity 1: 2 (12/29/14 1105)    Pain Location 1: Foot Pain Location 1: Abdomen    Pain Intervention(s) 1:  (premedicated for dressing change) Pain Intervention(s) 1: Medication (see MAR)  Patient Stated Pain Goal: 0 Patient Stated Pain Goal: 0  o Intervention effective: yes  o Other actions taken for pain:       Skin Assessment  Skin color Skin Color: Appropriate for ethnicity  Condition/Temperature Skin Condition/Temp: Dry, Warm  Integrity Skin Integrity: Wound (add Wound LDA)  Turgor Turgor: Non-tenting  Weekly Pressure Ulcer Documentation  Pressure  Injury Documentation: No Pressure Injury Noted-Pressure Ulcer Prevention Initiated  Wound Prevention & Protection Methods  Orientation of wound Orientation of Wound Prevention: Posterior  Location of Prevention Location of Wound Prevention: Sacrum/Coccyx  Dressing Present Dressing Present : No  Dressing Status    Wound Offloading Wound Offloading (Prevention Methods): Bed, pressure reduction mattress     INTAKE/OUPUT  Date 09/28/21 0700 - 09/29/21 0659 09/29/21 0700 - 09/30/21 0659   Shift 2210-5047 0982-2068 24 Hour Total 3042-3582 0532-0591 24 Hour Total   INTAKE   P.O. 480 250 730        P. O. 480 250 730      Shift Total(mL/kg) 480(5) 250(2.6) 730(7.6)      OUTPUT   Urine(mL/kg/hr)           Urine Occurrence(s) 3 x 1 x 4 x      Emesis/NG output           Emesis Occurrence(s)  0 x 0 x      Stool           Stool Occurrence(s) 0 x 0 x 0 x      Shift Total(mL/kg)          250 730      Weight (kg) 96 96 96 96 96 96       Recommendations:  1. Patient needs and requests: Toileting    2. Pending tests/procedures: routine labs     3. Functional Level/Equipment: Partial (one person) / Bed (comment)    Fall Precautions:   Fall risk precautions were reinforced with the patient; he was instructed to call for help prior to getting up. The following fall risk precautions were continued: bed/ chair alarms, door signage, yellow bracelet and socks as well as update of the Bard  tool in the patient's room. Willy Score: 3    HEALS Safety Check    A safety check occurred in the patient's room between off going nurse and oncoming nurse listed above.     The safety check included the below items  Area Items   H  High Alert Medications - Verify all high alert medication drips (heparin, PCA, etc.)   E  Equipment - Suction is set up for ALL patients (with yanker)  - Red plugs utilized for all equipment (IV pumps, etc.)  - WOWs wiped down at end of shift.  - Room stocked with oxygen, suction, and other unit-specific supplies   A  Alarms - Bed alarm is set for fall risk patients  - Ensure chair alarm is in place and activated if patient is up in a chair   L  Lines - Check IV for any infiltration  - Bal bag is empty if patient has a Bal   - Tubing and IV bags are labeled   S  Safety   - Room is clean, patient is clean, and equipment is clean. - Hallways are clear from equipment besides carts. - Fall bracelet on for fall risk patients  - Ensure room is clear and free of clutter  - Suction is set up for ALL patients (with yanker)  - Hallways are clear from equipment besides carts.    - Isolation precautions followed, supplies available outside room, sign posted     Vaughn Becerra RN

## 2021-09-29 NOTE — PROGRESS NOTES
Problem: Mobility Impaired (Adult and Pediatric)  Goal: *Therapy Goal (Edit Goal, Insert Text)  Description: Physical Therapy Short Term Goals  Initiated 9/20/2021, re-assessed 9/27/21, and to be accomplished within 7 day(s) [10/04/21]  1. Patient will move from supine to sit and sit to supine  and roll side to side in bed with modified independence. (met 9/27)   Goal updated: pt will perform bed mobility with independence without side rails on bed  2. Patient will transfer from bed to chair and chair to bed with Stand-by assistance using the least restrictive device, NWB R LE. (met 9/27, with use of RW)   Goal updated: pt will transfer from bed to chair and chair to bed with Supervision using a RW and NWB R LE.  3.  Patient will perform sit to stand with Stand-by assistance to RW and NWB R LE. (met 9/27/21)   Goals updated: pt will perform sit to stand with supervision to RW and NWB R LE.  4.  (Goal revised) Patient will ambulate with contact guard assist for 50 feet with RW, NWB R LE and hop method. (met 9/27/21)   Goal updated: pt will ambulate 70 ft with RW and SBA on level surfaces, NWB R LE.  5.  Patient will propel w/c 150 ft with supervision on level surfaces using B UE's and L LE. (met 9/27/21)   Goal updated: pt will propel w/c 150 ft with supervision on uneven surfaces outside. Physical Therapy Long Term Goals  Initiated 9/20/2021 and to be accomplished within 21 day(s) [10/11/21]  1. Patient will transfer from bed to chair and chair to bed with modified independence using the least restrictive device. 2.  Patient will perform sit to stand with modified independence and RW. 3.  Patient will ambulate with supervision for 80 feet with RW, NWB R LE, and hop method. (to manage household distances)  4.   Patient will ascend/descend 4 stairs with one handrail(s) with minimal assistance/contact guard assist.     Outcome: Progressing Towards Goal     PHYSICAL THERAPY TREATMENT    Patient: Ivette Morse (57 y.o. male)  Date: 2021  Diagnosis: Diabetic foot infection ([facility] [address] [E11.628, L08.9] [facility]  Precautions: Fall, NWB  Chart, physical therapy assessment, plan of care and goals were reviewed. Time In:0800  Time Out:0900    Patient seen for: Balance activities;Gait training;Patient education;Transfer training; Wheelchair mobility     Time In:1303  Time [id number]    Patient seen for: Balance activities; Patient education;Transfer training; Therapeutic exercise; Wheelchair mobility    Pain:  Pt pain was reported as 0/10 pre-treatment. Pt pain was reported as 0/10 post-treatment. Intervention: N/A    Patient identified with name and : yes    SUBJECTIVE:      Pt appears frustrated at times with curb negotiation training and requires increased time with visual and verbal cuing and education throughout for problem solving safe curb negotiation with RW while maintaining right LE NWB. Pt states, \"I need to see it. \"    OBJECTIVE DATA SUMMARY:    Objective:     BED/MAT MOBILITY Daily Assessment     (Performed on Mat Table)  Supine <> Sit with modified independence      TRANSFERS Daily Assessment     Transfer Type: Other  Other: stand step with RW  Transfer Assistance : 5 (Supervision/setup)  Sit to Stand Assistance: Supervision   Pt requires supervision for safety with increased verbal cues for safe hand placement when performing transfers to/from standard 18\" height chair without arm rests as pt is inconsistent with safe hand placement.         GAIT Daily Assessment    Gait Description (WDL) Exceptions to WDL    Gait Abnormalities Decreased left foot clearance while maintaining right LE NWB    Assistive device Walker, rolling    Ambulation assistance - level surface 5 (Supervision/setup)    Distance 106 Feet (ft) x 1 trial, 12 Feet x 3 trials, 42 Feet x 2 trials    Ambulation assistance- uneven surface  NT    Comments Pt ambulates with decreased left foot clearance but requires no verbal cues for scap add and depression with B UE weight bearing on RW        STEPS/STAIRS Daily Assessment     Curbs/Ramps Minimum assistance (with RW)   Pt negotiated 2\" step x 2 repetitions x 3 separate trials requiring minimal assistance for steadying with maximal encouragement and moderate cues for safe RW management throughout. BALANCE Daily Assessment     Sitting - Static: Good (unsupported)  Sitting - Dynamic: Good (unsupported)  Standing - Static: Fair  Standing - Dynamic : Impaired        WHEELCHAIR MOBILITY Daily Assessment     Functional Level: 6        THERAPEUTIC EXERCISES Daily Assessment     Supine LE Strength Training  3 Sets of 10 Repetitions  Right and Left SLR Hip Flexion with Quad Set  Pt takes increased time to perform task and cuing in between sets. ASSESSMENT:  Pt is progressing well participating in curb negotiation training. However, pt requires increased time to process education and appears frustrated at times with curb negotiation. Progression toward goals:  [x]      Improving appropriately and progressing toward goals  []      Improving slowly and progressing toward goals  []      Not making progress toward goals and plan of care will be adjusted      PLAN:  Patient continues to benefit from skilled intervention to address the above impairments. Continue treatment per established plan of care. Emphasize functional strengthening with repetition to promote safe carryover and improved functional strength. Discharge Recommendations:  Home Health Physical Therapy with 24 hour supervision  Further Equipment Recommendations for Discharge:  rolling walker and wheelchair 18 inch with removable arm rests, removable and elevating leg rests, gel cushion and anti-tippers      Estimated Discharge Date: 10/08/2021    Activity Tolerance:   Good  Please refer to the flowsheet for vital signs taken during this treatment.     After treatment:   [] Patient left in no apparent distress in bed  [x] Patient left in no apparent distress sitting up in chair  [] Patient left in no apparent distress in w/c mobilizing under own power  [] Patient left in no apparent distress dining area  [] Patient left in no apparent distress mobilizing under own power  [x] Call bell left within reach  [] Nursing notified  [] Caregiver present  [] Bed alarm activated   [x] Chair alarm activated      Dahiana James, PT, DPT  9/29/2021

## 2021-09-29 NOTE — INTERDISCIPLINARY ROUNDS
[address]  [address] [place] [address], [address]     INPATIENT REHABILITATION  DISCHARGE RECOMMENDATION SHEET    Date: 9/29/2021     Name: Mili Rincon Age / Sex: 60 y.o. / male   CSN: [id number] MRN: [id number]   [facility] Date: 9/18/2021 Discharge Date: 10/8/2021        [facility] WALKING AIDS FOLLOW-UP SERVICES      Height  []  Straight cane  [] DMV Handicap Placard    []  #14 ½ venkata height  []  Forearm crutches OUTPATIENT    []  Venkata height  []  Axillary crutches  []  PT    [x]  Standard height  []  LBQC  []  OT    []  Reclining high back  []  SBQC  []  ST       Weight  HOME HEALTH    []  Standard weight WALKERS  [x]  PT    []  Lightweight  []  Standard height  [x]  OT    []  High-strength lightweight  []  Small adult  []  ST    []  Heavy Duty   []  Tall walker  [x]  Nursing    []  Patient is unable to self-propel a standard weight wheelchair  [x]  Rolling walker with 5 single fixed wheels  [x]  Wound care  Location of wound:  Specify needs:      []  Anticoagulation management       Width  []  Bariatric walker  []  Diabetes management    []  Narrow (16)   []  Insulin management    [x]  Standard (18) ACCESSORIES  []  No insulin management    []  Wide (20)  []  Rear sure glide brakes  []  BP management    []  Other  []  10 rear wheel brake  []  CHF Telehealth       Arms  []  Tall leg extensions  []  Post-CABG care/monitoring    []  Standard  []  Other:  []  Colostomy care    []  Desk/Tray   []  Tube feeding    [x]  Removable BATHROOM EQUIPMENT  []  PICC line care      Foot/Leg Rests  [x]  Bedside commode  []  Bal catheter care    [x]  Removable  []  Extra wide bedside commode  []  Other:     [x]  Elevating  []  3:1 commode WITH drop arms  []  Medical Social Worker      Other  [x]  Tub transfer bench  []  Aide    []  Brake extensions  []  Shower chair     []  Padded gloves       []  Antitippers           CUSHIONS OTHER     []  Foam cushion  []  Seat belt     [x]  Gel cushion  []  Gait belt     []  Jayla Iverson II  []  Transfer board - Size:     []  Roho  []  Oxygen     []  Other  []  CPM      []  [address]  []  Ramp     []  Hospital bed  []  Hip kit     []  Special mattress  []  Reacher     []  Trapeze bar       Preferred Local Pharmacy (not mail-order):  [facility]

## 2021-09-29 NOTE — INTERDISCIPLINARY ROUNDS
[facility] PHYSICAL REHABILITATION  [address] [place] [address], [address]    INPATIENT REHABILITATION  PRE-TEAM CONFERENCE SUMMARY     Date of Conference: 9/30/2021    Patient Information:        Name: Emi Akbar Age / Sex: 60 y.o. / male   CSN: [id number] MRN: [id number]   [facility] Date: 9/18/2021 Length of Stay: 11 days     Primary Rehab Diagnosis: Impaired Mobility and ADLs secondary to:  1. Diabetic infection of right foot  2. S/P Incision and drainage, with decompression of multiple subfascial layers, right foot; Wound debridement, skin, soft tissue, and muscle, right foot (9/9/2021 - Dr. Velia Carlos)  3.  S/P Incision and drainage of right foot (9/4/2021 - Dr. Jairo Navarro.)    Therapy:     FIM SCORES Initial Assessment Weekly Progress Assessment 9/29/2021   Eating Functional Level: 5  Comments: set-up  5   Swallowing     Grooming 5  5   Bathing 3  5      Upper Body Dressing Functional Level: 4  Items Applied/Steps Completed: Pullover (4 steps)  Comments: SBA/ Min A  5   Lower Body Dressing Functional Level: 2  Items Applied/Steps Completed: Elastic waist pants (3 steps), Shoe, left (1 step), Underpants (3 steps)  Comments: Care coordinated with Rosalba Rebolledo, RN for nurse to disconnect wound vac in order to don/doff boxers and elastic waist shorts; Min A to don L sock (1 step)  4   Toileting Functional Level: 2  Comments: BSC at the bedside; CM & hygiene performed in stance with RW while adhering to NWB R LE; Max A for pants down/up  5   Bladder 0 1   Bowel  0 0   Toilet Transfer Mathews Toilet Transfer Score: 4  Comments: Min A w/ RW (stand pivot LLE); NWB R LE  4   Tub/Shower Transfer Mathews Tub or Shower Type: Tub/Shower combination  Tub/Shower Transfer Score: 0  Comments: Did not assess due to safety (NWB RLE) with wound vac intact R foot  0      Comprehension Primary Mode of Comprehension: Auditory  Score: 5     5   Expression Primary Mode of Expression: Verbal  Score: 5  5 Social Interaction Score: 5  5   Problem Solving Score: 5  5   Memory Score: 5  5     FIM SCORES Initial Assessment Weekly Progress Assessment 9/29/2021   Bed/Chair/Wheelchair Transfers Transfer Type: Other  Other: stand pivot with RW; and lateral squat pivot  Transfer Assistance : 4 (Minimal assistance) (with both methods; vc's fo hand placement w/ squat pivot)  Sit to Stand Assistance: Minimal assistance  Car Transfers: Minimum assistance (assist for R LE in/out of car & mgm't of door)  Car Type: car simulator in rehab Transfer Type:  Other  Other: stand step with RW  Transfer Assistance : 5 (Supervision/setup)  Sit to Stand Assistance: Supervision   Bed Mobility Rolling Right 5 (Stand-by assistance)   Rolling Left 5 (Stand-by assistance)   Supine to Sit 5 (Stand-by assistance)   Sit to Stand Minimal assistance   Sit to Supine  (CGA for R LE & mgm't of wound vac tube)    Rolling Right    Modified Independent   Rolling Left     Modified Independent   Supine to Sit     Modified Independent   Sit to Stand   Supervision   Sit to Supine     Modified Independent      Locomotion (W/C) Able to Propel (ft): 75 feet  Functional Level:  (min A)  Curbs/Ramps Assist Required (FIM Score): 0 (Not tested)  Wheelchair Setup Assist Required : 2 (Maximal assistance)  Wheelchair Management: Manages left brake, Manages right brake (with supervision & vc's) Function 6  Setup Assistance         Locomotion (W/C distance)    150 Feet   Locomotion (Walk) 4 (Contact guard assistance) 5 (Supervision/setup)  Walker, rolling   Locomotion (Walk dist.) 35 Feet (ft) 106 Feet (ft)   Steps/Stairs Steps/Stairs Ambulated (#): 1 (4\" height step)  Level of Assist : 3 (Moderate assistance)  Rail Use: Both 2\" Curb Step with RW with minimal assistance         Nursing:     Neuro:   AAA&O x  4          Respiratory:   [x] WNL   [] O2 LPM:   Other:  Peripheral Vascular:   [] TEDS present   [] Edema present ____ Grade   Cardiac:   [x] WNL   [] Other  Genitourinary:   [x] continent   [] incontinent   [] dennis  Abdominal ___9/29____ LBM  GI: __reg , diabetic_____ Diet ___thin__ Liquids _____ tube feeds  Musculoskeletal: ____ ROM Transfers _____ Assistive Device Used  _mod.___ Level of Assistance  Skin Integumentary:   [] Intact   [] Not Intact   __________Preventative Measures  Details____Wound vac to right lower ext.__________________________________________________________  Pain: [x] Controlled   [] Not Controlled   Pain Meds:   [] Scheduled   [] PRN        Registered Dietitian / Nutrition:   No data found. Pt reported good appetite and meal intake. Tolerating diet. Reviewed diabetic diet again with pt today; he denied having any questions regarding diet education on diabetic diet. Pt reported that he also saw Glycemic Control on 9/28 for additional education on diabetic diet and BG monitoring. Pt denied having any nutrition related concerns at time of visit. Supplements:          [] Yes   [x] No      Amount of supplement consumed:  Not applicable       Intake/Output Summary (Last 24 hours) at 9/29/2021 0946  Last data filed at 9/29/2021 0939  Gross per 24 hour   Intake 970 ml   Output    Net 970 ml                                Last bowel movement: 9/28      Interdisciplinary Team Goals:     1. Discipline  Physical Therapy    Goal  Encourage pt to perform functional transfers at a modified independent level. Barrier  Impaired strength, Impaired balance    Intervention  Education, Transfer training, Strength training    Goal written by:   Mayito Cerna PT, DPT     2. Discipline  Occupational Therapy    Goal  Pt will perform UB bathing/dressing with Peter     Barrier  Decreased standing balance and BUE strengtt    Intervention  Therex, Theract, Self Care Retraining    Goal written by:  ELISEO Jara/L     3. Discipline  Speech Therapy    Goal      Barrier      Intervention      Goal written by:       4.  Discipline  Nursing    Goal pt. Wound will be free from infection and heal properly. Pt. Will be free from further skin breakdown or pressure ulcers. Barrier  Decreased mobility, diabetic foot infection, Right lower ext cellulitis. Intervention  Inspect wound/ skin routinely, encourage proper repositioning, keep skin clean, dry and due proper dressing changes, and skin care as ordered. Goal written by:  Ragini Monet RN     5. Discipline  Clinical Psychology    Goal  Maintain mood stability and accept forced dependency in recovery    Barrier  Stress with illness/recovery and forced dependency    Intervention  Patient education and support , as needed    Goal written by:  Narendra Prater, PhD     6. Discipline  Nutrition / Dietetics    Goal  - PO nutrition intake will continue to meet >75% of patients estimated nutritional needs over the next 7 days.   - Patient will increase knowledge of appropriate food choices on a diabetic diet prior to discharge. Barrier  none known     Intervention  continue po diet. Followed up with pt regarding diabetic diet    Goal written by:  Crispin Delcid RD       Disposition / Discharge Planning:      Follow-up services:  [x] Physical Therapy             [x] Occupational Therapy       [] Speech Therapy           [] Skilled Nursing      [] Medical Social Worker   [] Aide        [] Outpatient      [] vs   [x] Home Health  [] vs       [] to progress to outpatient       [] with 24-hour supervision       [] with 24-hour assistance   [] Rivas HOWELL recommendations:  bedside commronald, NELIDA, w/c   Estimated discharge date:  10/8/2021   Discharge Location:  [] Home  [] versus    [] [place]    [] [address] [place] [address]   [] Other:           Electronic Signatures:      Signature Date Signed   Physical Therapist    Sahra Torres PT, DPT 9/30/2021    Occupational Therapist    ELISEO Dupont/L   9/29/21   Speech Therapist         Recreational Therapist    Blake Lopez Leeanne Le 9/29/2021   Nursing    José Miguel De La Fuente RN 9/29/2021   Dietitian    Teresa Manley RD  9/29/2021   Clinical Psychologist    Dennis Dyer, PhD  9/29/2021    Physician    Fred Caceres MD  9/29/2021       Donavan Torre, MSW  9/29/2021     Opportunity to share with family/caregiver[] YES [] NO    Relationship to patient____________________________________________________      The above information has been reviewed with the patient in a language that they can understand. Opportunity for comments and questions has been provided and a signed attestation has been scanned into the \"media tab\" of the EMR.       Patient Signature: ______________________________________________________    Date Signed: __________________________________________________________

## 2021-09-30 LAB
ANION GAP SERPL CALC-SCNC: 8 MMOL/L (ref 3–18)
BASOPHILS # BLD: 0 K/UL (ref 0–0.1)
BASOPHILS NFR BLD: 1 % (ref 0–2)
BUN SERPL-MCNC: 17 MG/DL (ref 7–18)
BUN/CREAT SERPL: 17 (ref 12–20)
CALCIUM SERPL-MCNC: 8.8 MG/DL (ref 8.5–10.1)
CHLORIDE SERPL-SCNC: 106 MMOL/L (ref 100–111)
CO2 SERPL-SCNC: 26 MMOL/L (ref 21–32)
CREAT SERPL-MCNC: 0.98 MG/DL (ref 0.6–1.3)
DIFFERENTIAL METHOD BLD: ABNORMAL
EOSINOPHIL # BLD: 0.2 K/UL (ref 0–0.4)
EOSINOPHIL NFR BLD: 3 % (ref 0–5)
ERYTHROCYTE [DISTWIDTH] IN BLOOD BY AUTOMATED COUNT: 14.9 % (ref 11.6–14.5)
GLUCOSE BLD STRIP.AUTO-MCNC: 217 MG/DL (ref 70–110)
GLUCOSE BLD STRIP.AUTO-MCNC: 294 MG/DL (ref 70–110)
GLUCOSE BLD STRIP.AUTO-MCNC: 63 MG/DL (ref 70–110)
GLUCOSE BLD STRIP.AUTO-MCNC: 69 MG/DL (ref 70–110)
GLUCOSE BLD STRIP.AUTO-MCNC: 71 MG/DL (ref 70–110)
GLUCOSE BLD STRIP.AUTO-MCNC: 74 MG/DL (ref 70–110)
GLUCOSE BLD STRIP.AUTO-MCNC: 77 MG/DL (ref 70–110)
GLUCOSE BLD STRIP.AUTO-MCNC: 81 MG/DL (ref 70–110)
GLUCOSE BLD STRIP.AUTO-MCNC: 94 MG/DL (ref 70–110)
GLUCOSE SERPL-MCNC: 54 MG/DL (ref 74–99)
HCT VFR BLD AUTO: 34.1 % (ref 36–48)
HCT VFR BLD AUTO: 35.2 % (ref 36–48)
HGB BLD-MCNC: 10.9 G/DL (ref 13–16)
HGB BLD-MCNC: 11.1 G/DL (ref 13–16)
LYMPHOCYTES # BLD: 2.5 K/UL (ref 0.9–3.6)
LYMPHOCYTES NFR BLD: 45 % (ref 21–52)
MCH RBC QN AUTO: 31.1 PG (ref 24–34)
MCHC RBC AUTO-ENTMCNC: 32 G/DL (ref 31–37)
MCV RBC AUTO: 97.4 FL (ref 78–100)
MONOCYTES # BLD: 0.6 K/UL (ref 0.05–1.2)
MONOCYTES NFR BLD: 10 % (ref 3–10)
NEUTS SEG # BLD: 2.3 K/UL (ref 1.8–8)
NEUTS SEG NFR BLD: 40 % (ref 40–73)
PLATELET # BLD AUTO: 458 K/UL (ref 135–420)
PMV BLD AUTO: 10.3 FL (ref 9.2–11.8)
POTASSIUM SERPL-SCNC: 4.3 MMOL/L (ref 3.5–5.5)
RBC # BLD AUTO: 3.5 M/UL (ref 4.35–5.65)
SODIUM SERPL-SCNC: 140 MMOL/L (ref 136–145)
WBC # BLD AUTO: 5.6 K/UL (ref 4.6–13.2)

## 2021-09-30 PROCEDURE — 74011250637 HC RX REV CODE- 250/637: Performed by: FAMILY MEDICINE

## 2021-09-30 PROCEDURE — 85025 COMPLETE CBC W/AUTO DIFF WBC: CPT

## 2021-09-30 PROCEDURE — 2709999900 HC NON-CHARGEABLE SUPPLY

## 2021-09-30 PROCEDURE — 97535 SELF CARE MNGMENT TRAINING: CPT

## 2021-09-30 PROCEDURE — 97530 THERAPEUTIC ACTIVITIES: CPT

## 2021-09-30 PROCEDURE — 97112 NEUROMUSCULAR REEDUCATION: CPT

## 2021-09-30 PROCEDURE — 97116 GAIT TRAINING THERAPY: CPT

## 2021-09-30 PROCEDURE — 85018 HEMOGLOBIN: CPT

## 2021-09-30 PROCEDURE — 74011250637 HC RX REV CODE- 250/637: Performed by: EMERGENCY MEDICINE

## 2021-09-30 PROCEDURE — 77030025414 HC DRSG VAC ASST SMPLC KCON -B

## 2021-09-30 PROCEDURE — 99232 SBSQ HOSP IP/OBS MODERATE 35: CPT | Performed by: INTERNAL MEDICINE

## 2021-09-30 PROCEDURE — 65310000000 HC RM PRIVATE REHAB

## 2021-09-30 PROCEDURE — 74011250637 HC RX REV CODE- 250/637: Performed by: INTERNAL MEDICINE

## 2021-09-30 PROCEDURE — 74011250636 HC RX REV CODE- 250/636: Performed by: INTERNAL MEDICINE

## 2021-09-30 PROCEDURE — 82962 GLUCOSE BLOOD TEST: CPT

## 2021-09-30 PROCEDURE — 36415 COLL VENOUS BLD VENIPUNCTURE: CPT

## 2021-09-30 PROCEDURE — 97110 THERAPEUTIC EXERCISES: CPT

## 2021-09-30 PROCEDURE — 74011636637 HC RX REV CODE- 636/637: Performed by: EMERGENCY MEDICINE

## 2021-09-30 PROCEDURE — 74011636637 HC RX REV CODE- 636/637: Performed by: INTERNAL MEDICINE

## 2021-09-30 PROCEDURE — 80048 BASIC METABOLIC PNL TOTAL CA: CPT

## 2021-09-30 RX ORDER — INSULIN GLARGINE 100 [IU]/ML
12 INJECTION, SOLUTION SUBCUTANEOUS
Status: DISCONTINUED | OUTPATIENT
Start: 2021-10-01 | End: 2021-10-02

## 2021-09-30 RX ADMIN — INSULIN LISPRO 2 UNITS: 100 INJECTION, SOLUTION INTRAVENOUS; SUBCUTANEOUS at 12:44

## 2021-09-30 RX ADMIN — FAMOTIDINE 20 MG: 20 TABLET, FILM COATED ORAL at 17:56

## 2021-09-30 RX ADMIN — OXYCODONE HYDROCHLORIDE AND ACETAMINOPHEN 1 TABLET: 5; 325 TABLET ORAL at 23:20

## 2021-09-30 RX ADMIN — DOCUSATE SODIUM 100 MG: 100 CAPSULE, LIQUID FILLED ORAL at 17:56

## 2021-09-30 RX ADMIN — METOPROLOL TARTRATE 50 MG: 50 TABLET, FILM COATED ORAL at 09:32

## 2021-09-30 RX ADMIN — INSULIN LISPRO 6 UNITS: 100 INJECTION, SOLUTION INTRAVENOUS; SUBCUTANEOUS at 12:44

## 2021-09-30 RX ADMIN — LISINOPRIL 20 MG: 20 TABLET ORAL at 09:32

## 2021-09-30 RX ADMIN — HEPARIN SODIUM 5000 UNITS: 5000 INJECTION INTRAVENOUS; SUBCUTANEOUS at 09:33

## 2021-09-30 RX ADMIN — FAMOTIDINE 20 MG: 20 TABLET, FILM COATED ORAL at 09:32

## 2021-09-30 RX ADMIN — DOCUSATE SODIUM 100 MG: 100 CAPSULE, LIQUID FILLED ORAL at 09:32

## 2021-09-30 RX ADMIN — METHIMAZOLE 10 MG: 10 TABLET ORAL at 09:32

## 2021-09-30 RX ADMIN — HEPARIN SODIUM 5000 UNITS: 5000 INJECTION INTRAVENOUS; SUBCUTANEOUS at 00:33

## 2021-09-30 RX ADMIN — HEPARIN SODIUM 5000 UNITS: 5000 INJECTION INTRAVENOUS; SUBCUTANEOUS at 17:56

## 2021-09-30 RX ADMIN — METOPROLOL TARTRATE 50 MG: 50 TABLET, FILM COATED ORAL at 21:13

## 2021-09-30 RX ADMIN — POLYETHYLENE GLYCOL 3350 17 G: 17 POWDER, FOR SOLUTION ORAL at 09:32

## 2021-09-30 RX ADMIN — Medication 5000 UNITS: at 09:32

## 2021-09-30 NOTE — ROUTINE PROCESS
SHIFT CHANGE NOTE FOR [facility]    Bedside and Verbal shift change report given to TAMEKA Liu (oncoming nurse) by Mya Mon RN (offgoing nurse). Report included the following information SBAR, Kardex, MAR and Recent Results. Situation:   Code Status: Full Code   Hospital Day: 12   Problem List:   Hospital Problems  Date Reviewed: 6/10/2021        Codes Class Noted POA    Essential hypertension (Chronic) ICD-10-CM: I10  ICD-9-CM: 401.9  Unknown Yes        Type 2 diabetes mellitus without complication, with long-term current use of insulin (HCC) (Chronic) ICD-10-CM: E11.9, Z79.4  ICD-9-CM: 250.00, V58.67  Unknown Yes    Overview Signed 9/20/2021 11:13 PM by Kristin Cardoso MD     HbA1c (9/3/2021) = 10.8             Hyperthyroidism (Chronic) ICD-10-CM: E05.90  ICD-9-CM: 242.90  Unknown Yes        Vitamin D insufficiency (Chronic) ICD-10-CM: E55.9  ICD-9-CM: 268.9  9/19/2021 Yes    Overview Signed 9/20/2021 11:11 PM by Kristin Cardoso MD     Vitamin D 25-Hydroxy (9/19/2021) = 24.0             Status post incision and drainage ICD-10-CM: Z98.890  ICD-9-CM: V45.89  9/9/2021 Yes    Overview Signed 9/20/2021 11:18 PM by Kristin Cardoso MD     S/P Incision and drainage, with decompression of multiple subfascial layers, right foot; Wound debridement, skin, soft tissue, and muscle, right foot (9/9/2021 - Dr. Noni Marcial)             NRDEQ-36 ruled out by laboratory testing ICD-10-CM: Z20.822  ICD-9-CM: V01.79  9/8/2021 Yes    Overview Signed 9/20/2021 11:14 PM by Kristin Cardoso MD     SARS-CoV-2 ([address] [place] [address]) (9/8/2021):  Not detected             History of incision and drainage ICD-10-CM: Z98.890  ICD-9-CM: V45.89  9/4/2021 Yes    Overview Signed 9/20/2021 11:18 PM by Kristin Cardoso MD     S/P Incision and drainage of right foot (9/4/2021 - Dr. Africa Go.)             * (Principal) Diabetic infection of right foot [facility]) ICD-10-CM: E11.628, L08.9  ICD-9-CM: 250.80, 686.9  9/2/2021 Yes Impaired mobility and ADLs ICD-10-CM: Z74.09, Z78.9  ICD-9-CM: V49.89  9/2/2021 Yes              Background:   Past Medical History:   Past Medical History:   Diagnosis Date    COVID-19 ruled out by laboratory testing 9/8/2021    SARS-CoV-2 ([facility]) (9/8/2021):  Not detected    Diabetic infection of right foot ([address] 9/2/2021    Essential hypertension     Hyperthyroidism     Type 2 diabetes mellitus without complication, with long-term current use of insulin ([facility])     HbA1c (9/3/2021) = 10.8    Vitamin D insufficiency 9/19/2021    Vitamin D 25-Hydroxy (9/19/2021) = 24.0        Assessment:   Changes in Assessment throughout shift: No change to previous assessment     Patient has a central line: no Reasons if yes: na  Insertion date:na Last dressing date:na   Patient has Dennis Cath: no Reasons if yes: na   Insertion date:na  Shift dennis care completed: NO     Last Vitals:     Vitals:    09/28/21 2120 09/29/21 0723 09/29/21 1551 09/29/21 2043   BP: 128/69 116/65 128/71 120/70   Pulse: 82 90 79 80   Resp: 17 16 16 18   Temp: 97.8 °F (36.6 °C) 97.7 °F (36.5 °C) 97.8 °F (36.6 °C) 97 °F (36.1 °C)   SpO2: 99% 99% 100% 100%   Weight:       Height:            PAIN    Pain Assessment    Pain Intensity 1: 0 (09/30/21 0400) Pain Intensity 1: 2 (12/29/14 1105)    Pain Location 1: Foot Pain Location 1: Abdomen    Pain Intervention(s) 1:  (premedicated for dressing change) Pain Intervention(s) 1: Medication (see MAR)  Patient Stated Pain Goal: 0 Patient Stated Pain Goal: 0  o Intervention effective: yes  o Other actions taken for pain:       Skin Assessment  Skin color Skin Color: Appropriate for ethnicity  Condition/Temperature Skin Condition/Temp: Dry, Warm  Integrity Skin Integrity: Incision (comment)  Turgor Turgor: Non-tenting  Weekly Pressure Ulcer Documentation  Pressure  Injury Documentation: No Pressure Injury Noted-Pressure Ulcer Prevention Initiated  Wound Prevention & Protection Methods  Orientation of wound Orientation of Wound Prevention: Posterior  Location of Prevention Location of Wound Prevention: Buttocks, Sacrum/Coccyx  Dressing Present Dressing Present : No  Dressing Status    Wound Offloading Wound Offloading (Prevention Methods): Bed, pressure redistribution/air     INTAKE/OUPUT  Date 09/29/21 0700 - 09/30/21 0659 09/30/21 0700 - 10/01/21 0659   Shift 0700-1859 1900-0659 24 Hour Total 0700-1859 1900-0659 24 Hour Total   INTAKE   P.O. 1100  1100        P. O. 1100  1100      Shift Total(mL/kg) 1100(11.5)  1100(11.5)      OUTPUT   Urine(mL/kg/hr)           Urine Occurrence(s) 3 x 2 x 5 x      Emesis/NG output           Emesis Occurrence(s) 0 x  0 x      Stool           Stool Occurrence(s) 0 x 0 x 0 x      Shift Total(mL/kg)         NET 1100  1100      Weight (kg) 96 96 96 96 96 96       Recommendations:  1. Patient needs and requests:meds,assist to the bsc    Pending tests/procedures: labs  2. Functional Level/Equipment: Partial (one person) /      Fall Precautions:   Fall risk precautions were reinforced with the patient; he was instructed to call for help prior to getting up. The following fall risk precautions were continued: bed/ chair alarms, door signage, yellow bracelet and socks as well as update of the [employer] tool in the patient's room. Willy Score: 3    HEALS Safety Check    A safety check occurred in the patient's room between off going nurse and oncoming nurse listed above.     The safety check included the below items  Area Items   H  High Alert Medications - Verify all high alert medication drips (heparin, PCA, etc.)   E  Equipment - Suction is set up for ALL patients (with yanker)  - Red plugs utilized for all equipment (IV pumps, etc.)  - WOWs wiped down at end of shift.  - Room stocked with oxygen, suction, and other unit-specific supplies   A  Alarms - Bed alarm is set for fall risk patients  - Ensure chair alarm is in place and activated if patient is up in a chair   L  Lines - Check IV for any infiltration  - Bal bag is empty if patient has a Bal   - Tubing and IV bags are labeled   S  Safety   - Room is clean, patient is clean, and equipment is clean. - Hallways are clear from equipment besides carts. - Fall bracelet on for fall risk patients  - Ensure room is clear and free of clutter  - Suction is set up for ALL patients (with yanker)  - Hallways are clear from equipment besides carts.    - Isolation precautions followed, supplies available outside room, sign posted     Jessica Martinez RN

## 2021-09-30 NOTE — ROUTINE PROCESS
SHIFT CHANGE NOTE FOR [facility]    Bedside and Verbal shift change report given to Deepti Luther RN (oncoming nurse) by Marce White RN (offgoing nurse). Report included the following information SBAR, Kardex, MAR and Recent Results. Situation:   Code Status: Full Code   Hospital Day: 12   Problem List:   Hospital Problems  Date Reviewed: 9/30/2021        Codes Class Noted POA    Essential hypertension (Chronic) ICD-10-CM: I10  ICD-9-CM: 401.9  Unknown Yes        Type 2 diabetes mellitus without complication, with long-term current use of insulin (HCC) (Chronic) ICD-10-CM: E11.9, Z79.4  ICD-9-CM: 250.00, V58.67  Unknown Yes    Overview Signed 9/20/2021 11:13 PM by Colby Koch MD     HbA1c (9/3/2021) = 10.8             Hyperthyroidism (Chronic) ICD-10-CM: E05.90  ICD-9-CM: 242.90  Unknown Yes        Vitamin D insufficiency (Chronic) ICD-10-CM: E55.9  ICD-9-CM: 268.9  9/19/2021 Yes    Overview Signed 9/20/2021 11:11 PM by Colby Koch MD     Vitamin D 25-Hydroxy (9/19/2021) = 24.0             Status post incision and drainage ICD-10-CM: Z98.890  ICD-9-CM: V45.89  9/9/2021 Yes    Overview Signed 9/20/2021 11:18 PM by Colby Koch MD     S/P Incision and drainage, with decompression of multiple subfascial layers, right foot; Wound debridement, skin, soft tissue, and muscle, right foot (9/9/2021 - Dr. Maricruz Khanna)             [id number] ruled out by laboratory testing ICD-10-CM: Z20.822  ICD-9-CM: V01.79  9/8/2021 Yes    Overview Signed 9/20/2021 11:14 PM by Colby Koch MD     SARS-CoV-2 ([address] [place] [address]) (9/8/2021):  Not detected             History of incision and drainage ICD-10-CM: Z98.890  ICD-9-CM: V45.89  9/4/2021 Yes    Overview Signed 9/20/2021 11:18 PM by Colby Koch MD     S/P Incision and drainage of right foot (9/4/2021 - Dr. Juan Jose Greenwood.)             * (Principal) Diabetic infection of right foot [facility]) ICD-10-CM: E11.628, L08.9  ICD-9-CM: 250.80, 686.9  9/2/2021 Yes Impaired mobility and ADLs ICD-10-CM: Z74.09, Z78.9  ICD-9-CM: V49.89  9/2/2021 Yes              Background:   Past Medical History:   Past Medical History:   Diagnosis Date    COVID-19 ruled out by laboratory testing 9/8/2021    SARS-CoV-2 ([facility] Rosyeliseo, [facility]) (9/8/2021):  Not detected    Diabetic infection of right foot ([address] 9/2/2021    Essential hypertension     Hyperthyroidism     Type 2 diabetes mellitus without complication, with long-term current use of insulin ([facility])     HbA1c (9/3/2021) = 10.8    Vitamin D insufficiency 9/19/2021    Vitamin D 25-Hydroxy (9/19/2021) = 24.0        Assessment:   Changes in Assessment throughout shift: No change to previous assessment     Patient has a central line: no Reasons if yes: na  Insertion date:na Last dressing date:na   Patient has Dennis Cath: no Reasons if yes: na   Insertion date:na  Shift dennis care completed: no     Last Vitals:     Vitals:    09/29/21 1551 09/29/21 2043 09/30/21 0800 09/30/21 1557   BP: 128/71 120/70 128/71 115/66   Pulse: 79 80 80 78   Resp: 16 18 18 15   Temp: 97.8 °F (36.6 °C) 97 °F (36.1 °C) 98 °F (36.7 °C) 98.4 °F (36.9 °C)   SpO2: 100% 100% 100% 100%   Weight:       Height:            PAIN    Pain Assessment    Pain Intensity 1: 0 (09/30/21 1557) Pain Intensity 1: 2 (12/29/14 1105)    Pain Location 1: Foot Pain Location 1: Abdomen    Pain Intervention(s) 1:  (premedicated for dressing change) Pain Intervention(s) 1: Medication (see MAR)  Patient Stated Pain Goal: 0 Patient Stated Pain Goal: 0  o Intervention effective: yes  o Other actions taken for pain:       Skin Assessment  Skin color Skin Color: Appropriate for ethnicity  Condition/Temperature Skin Condition/Temp: Dry, Warm  Integrity Skin Integrity: Incision (comment)  Turgor Turgor: Non-tenting  Weekly Pressure Ulcer Documentation  Pressure  Injury Documentation: No Pressure Injury Noted-Pressure Ulcer Prevention Initiated  Wound Prevention & Protection Methods  Orientation of wound Orientation of Wound Prevention: Posterior  Location of Prevention Location of Wound Prevention: Buttocks, Sacrum/Coccyx  Dressing Present Dressing Present : No  Dressing Status    Wound Offloading Wound Offloading (Prevention Methods): Bed, pressure redistribution/air     INTAKE/OUPUT  Date 09/29/21 1900 - 09/30/21 0659 09/30/21 0700 - 10/01/21 0659   Shift 3223-9194 24 Hour Total 4796-1448 2919-4522 24 Hour Total   INTAKE   P.O.  1100 1320  1320     P. O.  1100 1320  1320   Shift Total(mL/kg)  1100(11.5) 1320(13.8)  1320(13.8)   OUTPUT   Urine(mL/kg/hr)   500(0.4)  500     Urine Voided   500  500     Urine Occurrence(s) 2 x 5 x 3 x  3 x   Emesis/NG output          Emesis Occurrence(s)  0 x      Stool          Stool Occurrence(s) 0 x 0 x 0 x  0 x   Shift Total(mL/kg)   500(5.2)  500(5.2)   NET  1100 820  820   Weight (kg) 96 96 96 96 96       Recommendations:  1. Patient needs and requests: na    2. Pending tests/procedures: na     3. Functional Level/Equipment:   /      Fall Precautions:   Fall risk precautions were reinforced with the patient; he was instructed to call for help prior to getting up. The following fall risk precautions were continued: bed/ chair alarms, door signage, yellow bracelet and socks as well as update of the boosks tool in the patient's room. Willy Score: 3    HEALS Safety Check    A safety check occurred in the patient's room between off going nurse and oncoming nurse listed above.     The safety check included the below items  Area Items   H  High Alert Medications - Verify all high alert medication drips (heparin, PCA, etc.)   E  Equipment - Suction is set up for ALL patients (with yanker)  - Red plugs utilized for all equipment (IV pumps, etc.)  - WOWs wiped down at end of shift.  - Room stocked with oxygen, suction, and other unit-specific supplies   A  Alarms - Bed alarm is set for fall risk patients  - Ensure chair alarm is in place and activated if patient is up in a chair   L  Lines - Check IV for any infiltration  - Bal bag is empty if patient has a Bal   - Tubing and IV bags are labeled   S  Safety   - Room is clean, patient is clean, and equipment is clean. - Hallways are clear from equipment besides carts. - Fall bracelet on for fall risk patients  - Ensure room is clear and free of clutter  - Suction is set up for ALL patients (with yanker)  - Hallways are clear from equipment besides carts.    - Isolation precautions followed, supplies available outside room, sign posted     Olena Mahmood RN

## 2021-09-30 NOTE — PROGRESS NOTES
Problem: Mobility Impaired (Adult and Pediatric)  Goal: *Therapy Goal (Edit Goal, Insert Text)  Description: Physical Therapy Short Term Goals  Initiated 9/20/2021, re-assessed 9/27/21, and to be accomplished within 7 day(s) [10/04/21]  1. Patient will move from supine to sit and sit to supine  and roll side to side in bed with modified independence. (met 9/27)   Goal updated: pt will perform bed mobility with independence without side rails on bed  2. Patient will transfer from bed to chair and chair to bed with Stand-by assistance using the least restrictive device, NWB R LE. (met 9/27, with use of RW)   Goal updated: pt will transfer from bed to chair and chair to bed with Supervision using a RW and NWB R LE.  3.  Patient will perform sit to stand with Stand-by assistance to RW and NWB R LE. (met 9/27/21)   Goals updated: pt will perform sit to stand with supervision to RW and NWB R LE.  4.  (Goal revised) Patient will ambulate with contact guard assist for 50 feet with RW, NWB R LE and hop method. (met 9/27/21)   Goal updated: pt will ambulate 70 ft with RW and SBA on level surfaces, NWB R LE.  5.  Patient will propel w/c 150 ft with supervision on level surfaces using B UE's and L LE. (met 9/27/21)   Goal updated: pt will propel w/c 150 ft with supervision on uneven surfaces outside. Physical Therapy Long Term Goals  Initiated 9/20/2021 and to be accomplished within 21 day(s) [10/11/21]  1. Patient will transfer from bed to chair and chair to bed with modified independence using the least restrictive device. 2.  Patient will perform sit to stand with modified independence and RW. 3.  Patient will ambulate with supervision for 80 feet with RW, NWB R LE, and hop method. (to manage household distances)  4.   Patient will ascend/descend 4 stairs with one handrail(s) with minimal assistance/contact guard assist.     Outcome: Progressing Towards Goal   PHYSICAL THERAPY TREATMENT    Patient: Tracy Sarah (57 y.o. male)  Date: 2021  Diagnosis: Diabetic foot infection ([facility] [E11.628, L08.9] Diabetic infection of right foot ([facility]  Precautions: Fall, NWB  Chart, physical therapy assessment, plan of care and goals were reviewed. Time In:1105  Time Out: 4112    Patient seen for: Gait training;Patient education; Therapeutic exercise;Transfer training; Wheelchair mobility    Pain:  Pt pain was reported as 0/10 pre-treatment. Pt pain was reported as 0/10 post-treatment. Intervention: none indicated    Patient identified with name and : yes    SUBJECTIVE:      Pt without c/o's. Pt states he is \"still working on phone calls\" in regards to a ramp at his home prior to d/c. Wife stated she didn't measure height of threshold at doorway to enter home yet, but she \"will do it today\"  Pt stated \"it was a good work out today\"    OBJECTIVE DATA SUMMARY:    Objective:     BED/MAT MOBILITY Daily Assessment     Rolling Right : 7 (Independent)  Rolling Left : 7 (Independent)  Supine to Sit : 7 (Independent)  Sit to Supine : 7 (Independent)      TRANSFERS Daily Assessment     Transfer Type: Other  Other: stand step w/RW; and lateral squat pivot  Transfer Assistance : 5 (Supervision/setup) (Sup w/RW;  SBA with squat pivot)  Sit to Stand Assistance: Supervision (vc's for management of wound vac tubing)    Pt demonstrates more comfort and safe technique with transfers using a RW vs a squat pivot. Focused a lot on management of wound vac tubing during transfers for safe technique during transfer training session. Continues to need Supervision for safety and cues for tubing management.          GAIT Daily Assessment    Gait Description (WDL) Exceptions to WDL    Gait Abnormalities Decreased step clearance (L LE)    Assistive device Walker, rolling (PT assist w/wound vac)    Ambulation assistance - level surface 5 (Supervision/setup)    Distance 125 Feet (ft)    Ambulation assistance- uneven surface  NT    Comments Shortened step length L LE; slower pace; able to adhere to NWB R LE well. STEPS/STAIRS Daily Assessment                         Curbs/Ramps Contact guard assistance (2\" curb, w/RW; vc's at times for safe technique)  3 reps with seated rest breaks between, ascending and descending each rep. Pt able to verbalize steps to complete task as taught in prior session. BALANCE Daily Assessment     Sitting - Static: Good (unsupported)  Sitting - Dynamic: Good (unsupported)  Standing - Static: Fair (+, with RW)  Standing - Dynamic : Impaired        WHEELCHAIR MOBILITY Daily Assessment     Able to Propel (ft): 160 feet  Functional Level: 6 (level surfaces)  Curbs/Ramps Assist Required (FIM Score):  (SBA for ramps/incines; using B UE's & L LE)  Wheelchair Setup Assist Required : 5 (Supervision/setup)  Wheelchair Management: Manages left brake;Manages right brake;Manages right footrest    Pt propelled w/c for >150 ft outside on sidewalks and black top in driveway with SBA using B UE's and L LE. Pt was able to negotiate w/c up and down inclines and declines with SBA and able to control speed during descent. Pt needed vc's at times to prevent pt from getting too close to edge of sidewalk on right side. THERAPEUTIC EXERCISES Daily Assessment       Supine B LE AROM strengthening ex's: SLR, and then hip abd and hip ext in side-lying; 10 reps x 3 sets with brief rest break between sets; vc's for proper technique and pace. ASSESSMENT:  Pt tolerated session well and was able to recall steps for curb negotiation from prior session. Pt demonstrating gradual improvement in being able to manage wound vac tubing during transfers. Pt able to control speed of descent during w/c mobility outside. Pt needs continued focus on managing wound vac tubing during transfers and progress curb training to 4\" height curb.    Progression toward goals:  []      Improving appropriately and progressing toward goals  [x]      Improving slowly and progressing toward goals  []      Not making progress toward goals and plan of care will be adjusted      PLAN:  Patient continues to benefit from skilled intervention to address the above impairments. Continue treatment per established plan of care. Discharge Recommendations:  Home Health PT with 24 hr supervision  Further Equipment Recommendations for Discharge:  rolling walker and wheelchair 18 inch with removable arm rests, removable and elevating leg rests, gel cushion and anti-tippers      Estimated Discharge Date: 10/8/21    Activity Tolerance:   Good, pt without c/o's  Please refer to the flowsheet for vital signs taken during this treatment.     After treatment:   [] Patient left in no apparent distress in bed  [x] Patient left in no apparent distress sitting up in chair  [x] Patient left in no apparent distress in w/c mobilizing under own power  [] Patient left in no apparent distress dining area  [] Patient left in no apparent distress mobilizing under own power  [] Call bell left within reach  [] Nursing notified  [] Caregiver present  [] Bed alarm activated   [x] Chair alarm activated      Jessica Queen, PT  9/30/2021

## 2021-09-30 NOTE — ROUTINE PROCESS
0745 Pt. Blood glucose critically low 54 pt. Asymptomatic no complaints. give orange juice Dr. Avinash Marcos was notified. Rechecked 61  0800 rechecked blood glucose 81 pt. Reported to be feeling fine. 0930 Pt. Sitting up in chair eating breakfast.   1200 with therapy. 1330 able to transfer from bed to chair with minimal assist.  1500 no change in assessment. 1700 Accucheck 63 orange juice given  Pt. Asymptomatic. 1730 rechecked 74,   And 77 pt. Eating dinner. Rechecked blood glucose 94.  1800 pt. Reported to be feeling fine.

## 2021-09-30 NOTE — INTERDISCIPLINARY ROUNDS
[facility] PHYSICAL REHABILITATION  [address] [place] [address], [address]    INPATIENT REHABILITATION  TEAM CONFERENCE SUMMARY     Date of Conference: 9/30/2021    Patient Information:        Name: Phillip Garza Age / Sex: 60 y.o. / male   CSN: [id number] MRN: [id number]   Admit Date: 9/18/2021 Length of Stay: 12 days     Primary Rehabilitation Diagnosis  1. Impaired Mobility and ADLs  2. Diabetic infection of right foot  3. S/P Incision and drainage, with decompression of multiple subfascial layers, right foot; Wound debridement, skin, soft tissue, and muscle, right foot (9/9/2021 - Dr. Song Gee)  4.  S/P Incision and drainage of right foot (9/4/2021 - Dr. Jerome Rodriguez.)    Comorbidities  Patient Active Problem List   Diagnosis Code    [facility] E11.628, L08.9    Essential hypertension I10    Vitamin D insufficiency E55.9    Impaired mobility and ADLs Z74.09, Z78.9    Type 2 diabetes mellitus without complication, with long-term current use of insulin ([facility] E11.9, Z79.4    COVID-19 ruled out by laboratory testing Z20.822    Status post incision and drainage Z98.890    History of incision and drainage Z98.890    Hyperthyroidism E05.90          Therapy:     FIM SCORES Initial Assessment Weekly Progress Assessment 9/30/2021   Eating Functional Level: 5  Comments: set-up Feeding/Eating Assistance: (P) 7 (Independent)5   Swallowing     Grooming 5 Grooming Assistance : (P) 5 (Supervision)  Comments: (P)  (Pt washed face )5   Bathing 3 Bathing Assistance, Upper: (P) 5 (Supervision)  Position Performed: (P) Seated in chair  Comments: (P)  pt washed UB seated at sink5  Bathing Assistance, Lower : (P) 5 (Supervision)  Position Performed: (P) Seated in chair;Standing  Comments: (P)  (pt leaned side to side to wash kim/buttock)   Upper Body Dressing Functional Level: 4  Items Applied/Steps Completed: Pullover (4 steps)  Comments: SBA/ Min A Dressing Assistance : (P) 5 (Supervision)  Comments: (P)  pt doffed/donned pullover shirt5   Lower Body Dressing Functional Level: 2  Items Applied/Steps Completed: Elastic waist pants (3 steps), Shoe, left (1 step), Underpants (3 steps)  Comments: Care coordinated with Joseph Harris RN for nurse to disconnect wound vac in order to don/doff boxers and elastic waist shorts; Min A to don L sock (1 step) Dressing Assistance : (P) 5 (Stand-by assistance)  Leg Crossed Method Used: (P) No  Adaptive Equipment Used: (P) Walker4   Toileting Functional Level: 2  Comments: BSC at the bedside; CM & hygiene performed in stance with RW while adhering to NWB R LE; Max A for pants down/up  5   Bladder 0 0   Bowel  0 0   Toilet Transfer [place] Toilet Transfer Score: 4  Comments: Min A w/ RW (stand pivot LLE); NWB R LE (P) 4 (Contact guard assistance)4   Tub/Shower Transfer [place] Tub or Shower Type: Tub/Shower combination  Tub/Shower Transfer Score: 0  Comments: Did not assess due to safety (NWB RLE) with wound vac intact R foot  0  (P) 0 (Not tested)   Comprehension Primary Mode of Comprehension: Auditory  Score: 5 Auditory  55   Expression Primary Mode of Expression: Verbal  Score: 5 Verbal5  5   Social Interaction Score: 5 55   Problem Solving Score: 5 45   Memory Score: 5 55     FIM SCORES Initial Assessment Weekly Progress Assessment 9/30/2021   Bed/Chair/Wheelchair Transfers Transfer Type: Other  Other: stand pivot with RW; and lateral squat pivot  Transfer Assistance : 4 (Minimal assistance) (with both methods; vc's fo hand placement w/ squat pivot)  Sit to Stand Assistance: Minimal assistance  Car Transfers: Minimum assistance (assist for R LE in/out of car & mgm't of door)  Car Type: car simulator in rehab Transfer Type:  Other  Other: stand step w/RW; and lateral squat pivot  Transfer Assistance : 5 (Supervision/setup) (Sup w/RW;  SBA with squat pivot)  Sit to Stand Assistance: Supervision (vc's for management of wound vac tubing)   Bed Mobility Rolling Right 5 (Stand-by assistance)   Rolling Left 5 (Stand-by assistance)   Supine to Sit 5 (Stand-by assistance)   Sit to Stand Minimal assistance   Sit to Supine  (CGA for R LE & mgm't of wound vac tube)    Rolling Right   7 (Independent)Modified Independent   Rolling Left   7 (Independent) Modified Independent   Supine to Sit   7 (Independent) Modified Independent   Sit to Stand   Supervision (vc's for management of wound vac tubing)   Sit to Supine   7 (Independent) Modified Independent      Locomotion (W/C) Able to Propel (ft): 75 feet  Functional Level:  (min A)  Curbs/Ramps Assist Required (FIM Score): 0 (Not tested)  Wheelchair Setup Assist Required : 2 (Maximal assistance)  Wheelchair Management: Manages left brake, Manages right brake (with supervision & vc's) Function 6 (level surfaces)  Setup Assistance  5 (Supervision/setup)      Locomotion (W/C distance)   160 [id number] Feet   Locomotion (Walk) 4 (Contact guard assistance) 5 (Supervision/setup)  Walker, rolling (PT assist w/wound vac)   Locomotion (Walk dist.) 35 Feet (ft) 125 Feet (ft)   Steps/Stairs Steps/Stairs Ambulated (#): 1 (4\" height step)  Level of Assist : 3 (Moderate assistance)  Rail Use: Both 2\" Curb Step with RW with minimal assistance         Nursing:     Neuro:   AAA&O x  4          Respiratory:   [x] WNL   [] O2 LPM:   Other:  Peripheral Vascular:   [] TEDS present   [] Edema present ____ Grade   Cardiac:   [x] WNL   [] Other  Genitourinary:   [x] continent   [] incontinent   [] dennis  Abdominal ___9/29____ LBM  GI: __reg , diabetic_____ Diet ___thin__ Liquids _____ tube feeds  Musculoskeletal: ____ ROM Transfers _____ Assistive Device Used  _mod.___ Level of Assistance  Skin Integumentary:   [] Intact   [] Not Intact   __________Preventative Measures  Details____Wound vac to right lower ext.__________________________________________________________  Pain: [x] Controlled   [] Not Controlled   Pain Meds:   [] Scheduled   [] PRN        Registered Dietitian / Nutrition:   No data found. Pt reported good appetite and meal intake. Tolerating diet. Reviewed diabetic diet again with pt today; he denied having any questions regarding diet education on diabetic diet. Pt reported that he also saw Glycemic Control on 9/28 for additional education on diabetic diet and BG monitoring. Pt denied having any nutrition related concerns at time of visit. Supplements:          [] Yes   [x] No      Amount of supplement consumed:  Not applicable       Intake/Output Summary (Last 24 hours) at 9/30/2021 1516  Last data filed at 9/30/2021 1007  Gross per 24 hour   Intake 500 ml   Output    Net 500 ml                                Last bowel movement: 9/28      Interdisciplinary Team Goals:     1. Discipline  Physical Therapy    Goal  Encourage pt to perform functional transfers at a modified independent level. Barrier  Impaired strength, Impaired balance    Intervention  Education, Transfer training, Strength training    Goal written by:   Guilherme Earl PT, DPT     2. Discipline  Occupational Therapy    Goal  Pt will perform UB bathing/dressing with Peter     Barrier  Decreased standing balance and BUE strengtt    Intervention  Therex, Theract, Self Care Retraining    Goal written by:  ELISEO Siegel/L     3. Discipline  Speech Therapy    Goal      Barrier      Intervention      Goal written by:       4. Discipline  Nursing    Goal  pt. Wound will be free from infection and heal properly. Pt. Will be free from further skin breakdown or pressure ulcers. Barrier  Decreased mobility, diabetic foot infection, Right lower ext cellulitis. Intervention  Inspect wound/ skin routinely, encourage proper repositioning, keep skin clean, dry and due proper dressing changes, and skin care as ordered. Goal written by:  Fuad King RN     5.   Discipline  Clinical Psychology    Goal  Maintain mood stability and accept forced dependency in recovery    Barrier  Stress with illness/recovery and forced dependency    Intervention  Patient education and support , as needed    Goal written by:  Anjel De Jesus, PhD     6. Discipline  Nutrition / Dietetics    Goal  - PO nutrition intake will continue to meet >75% of patients estimated nutritional needs over the next 7 days.   - Patient will increase knowledge of appropriate food choices on a diabetic diet prior to discharge. Barrier  none known     Intervention  continue po diet. Followed up with pt regarding diabetic diet    Goal written by:  John Pinon RD       Disposition / Discharge Planning:      Follow-up services:  [x] Physical Therapy             [x] Occupational Therapy       [] Speech Therapy           [] Skilled Nursing      [] Medical Social Worker   [] Aide        [] Outpatient      [] vs   [x] Home Health  [] vs       [] to progress to outpatient       [] with 24-hour supervision       [] with 24-hour assistance   [] Rivas HOWELL recommendations:  bedside NELIDA jean, w/c   Estimated discharge date:  10/8/2021   Discharge Location:  [x] Home  [] versus    [] [place]    [] [address]   [] Other:           Interdisciplinary team rounds were held this PM with the following team members:       Name   Physical Therapist    Mary Kay Murry, PT, DPT     Occupational Therapist    Orquidea Fajardo OTR/L   Recreational Therapist    Lou Olivares, CTRS   Nursing    Sharee Sauer RN      Physician    Prabhu Liz MD        VARINDER Romero          Signed:  Prabhu Liz MD    September 30, 2021

## 2021-09-30 NOTE — PROGRESS NOTES
[address]  [address] [place] [address], [address]     INPATIENT REHABILITATION  DAILY PROGRESS NOTE     Date: 9/30/2021    Name: Maye Echeverria Age / Sex: 60 y.o. / male   CSN: [id number] MRN: [id number]   [facility] Date: 9/18/2021 Length of Stay: 12 days     Primary Rehab Diagnosis: Impaired Mobility and ADLs secondary to:  1. Diabetic infection of right foot  2. S/P Incision and drainage, with decompression of multiple subfascial layers, right foot; Wound debridement, skin, soft tissue, and muscle, right foot (9/9/2021 - Dr. Zachery Leyden)  3. S/P Incision and drainage of right foot (9/4/2021 - Dr. Karina Tobias)      Subjective:     Patient seen and examined. Blood pressure controlled. No documented fever since admission. Blood glucose fairly controlled. Team conference was held at bedside this PM.     Patient's Complaint:   No significant medical complaints    Pain Control: stable, mild-to-moderate joint symptoms intermittently, reasonably well controlled by current meds      Objective:     Vital Signs:  Patient Vitals for the past 24 hrs:   BP Temp Pulse Resp SpO2   09/30/21 0800 128/71 98 °F (36.7 °C) 80 18 100 %   09/29/21 2043 120/70 97 °F (36.1 °C) 80 18 100 %   09/29/21 1551 128/71 97.8 °F (36.6 °C) 79 16 100 %        Physical Examination:  GENERAL SURVEY: Patient is awake, alert, oriented x 3, sitting comfortably on the chair, not in acute respiratory distress.   HEENT: pink palpebral conjunctivae, anicteric sclerae, no nasoaural discharge, moist oral mucosa  NECK: supple, no jugular venous distention, no palpable lymph nodes  CHEST/LUNGS: symmetrical chest expansion, good air entry, clear breath sounds  HEART: adynamic precordium, good S1 S2, no S3, regular rhythm, no murmurs  ABDOMEN: flat, bowel sounds appreciated, soft, non-tender  EXTREMITIES: (+) right foot/ankle wrapped in ACE wrap, pink nailbeds, no edema, full and equal pulses, no calf tenderness NEUROLOGICAL EXAM: The patient is awake, alert and oriented x 3, able to answer questions fairly appropriately, able to follow 1 and 2 step commands. Able to tell time from the wall clock. Cranial nerves II-XII are grossly intact. No gross sensory deficit. Motor strength is 4 to 4+/5 on BUE and LLE, 4- to 4/5 on the RLE.        Current Medications:  Current Facility-Administered Medications   Medication Dose Route Frequency    insulin glargine (LANTUS) injection 14 Units  14 Units SubCUTAneous QHS    insulin lispro (HUMALOG) injection 2 Units  2 Units SubCUTAneous TIDAC    metoprolol tartrate (LOPRESSOR) tablet 50 mg  50 mg Oral Q12H    polyethylene glycol (MIRALAX) packet 17 g  17 g Oral DAILY    insulin lispro (HUMALOG) injection   SubCUTAneous TIDAC    heparin (porcine) injection 5,000 Units  5,000 Units SubCUTAneous Q8H    lisinopriL (PRINIVIL, ZESTRIL) tablet 20 mg  20 mg Oral DAILY    methIMAzole (TAPAZOLE) tablet 10 mg  10 mg Oral DAILY    oxyCODONE-acetaminophen (PERCOCET) 5-325 mg per tablet 1-2 Tablet  1-2 Tablet Oral Q6H PRN    acetaminophen (TYLENOL) tablet 650 mg  650 mg Oral Q4H PRN    cholecalciferol (VITAMIN D3) capsule 5,000 Units  5,000 Units Oral DAILY    famotidine (PEPCID) tablet 20 mg  20 mg Oral BID    docusate sodium (COLACE) capsule 100 mg  100 mg Oral BID    magnesium hydroxide (MILK OF MAGNESIA) 400 mg/5 mL oral suspension 30 mL  30 mL Oral DAILY PRN    bisacodyL (DULCOLAX) tablet 10 mg  10 mg Oral Q48H PRN    glucose chewable tablet 16 g  4 Tablet Oral PRN    glucagon (GLUCAGEN) injection 1 mg  1 mg IntraMUSCular PRN    dextrose (D50W) injection syrg 12.5-25 g  25-50 mL IntraVENous PRN    hydrALAZINE (APRESOLINE) tablet 25 mg  25 mg Oral Q8H PRN       Allergies:  No Known Allergies      Functional Progress:    OCCUPATIONAL THERAPY    ON ADMISSION MOST RECENT   Eating  Functional Level: 5   Eating  Functional Level: 5     Grooming  Functional Level: 5 Grooming  Functional Level: 5     Bathing  Functional Level: 3   Bathing  Functional Level: 3     Upper Body Dressing  Functional Level: 4   Upper Body Dressing  Functional Level: 4     Lower Body Dressing  Functional Level: 2   Lower Body Dressing  Functional Level: 2     Toileting  Functional Level: 2   Toileting  Functional Level: 2     Toilet Transfers  Toilet Transfer Score: 4   Toilet Transfers  Toilet Transfer Score: 4     Tub /Shower Transfers  Tub/Shower Transfer Score: 0   Tub/Shower Transfers  Tub/Shower Transfer Score: 0       Legend:   7 - Independent   6 - Modified Independent   5 - Standby Assistance / Supervision / Set-up   4 - Minimum Assistance / Contact Guard Assistance   3 - Moderate Assistance   2 - Maximum Assistance   1 - Total Assistance / Dependent       Lab/Data Review:  Recent Results (from the past 24 hour(s))   GLUCOSE, POC    Collection Time: 09/29/21  5:30 PM   Result Value Ref Range    Glucose (POC) 119 (H) 70 - 110 mg/dL   GLUCOSE, POC    Collection Time: 09/29/21  9:25 PM   Result Value Ref Range    Glucose (POC) 146 (H) 70 - 110 mg/dL   HGB & HCT    Collection Time: 09/30/21  5:15 AM   Result Value Ref Range    HGB 11.1 (L) 13.0 - 16.0 g/dL    HCT 35.2 (L) 36.0 - 48.0 %   CBC WITH AUTOMATED DIFF    Collection Time: 09/30/21  5:15 AM   Result Value Ref Range    WBC 5.6 4.6 - 13.2 K/uL    RBC 3.50 (L) 4.35 - 5.65 M/uL    HGB 10.9 (L) 13.0 - 16.0 g/dL    HCT 34.1 (L) 36.0 - 48.0 %    MCV 97.4 78.0 - 100.0 FL    MCH 31.1 24.0 - 34.0 PG    MCHC 32.0 31.0 - 37.0 g/dL    RDW 14.9 (H) 11.6 - 14.5 %    PLATELET 387 (H) 898 - 420 K/uL    MPV 10.3 9.2 - 11.8 FL    NEUTROPHILS 40 40 - 73 %    LYMPHOCYTES 45 21 - 52 %    MONOCYTES 10 3 - 10 %    EOSINOPHILS 3 0 - 5 %    BASOPHILS 1 0 - 2 %    ABS. NEUTROPHILS 2.3 1.8 - 8.0 K/UL    ABS. LYMPHOCYTES 2.5 0.9 - 3.6 K/UL    ABS. MONOCYTES 0.6 0.05 - 1.2 K/UL    ABS. EOSINOPHILS 0.2 0.0 - 0.4 K/UL    ABS.  BASOPHILS 0.0 0.0 - 0.1 K/UL    DF AUTOMATED METABOLIC PANEL, BASIC    Collection Time: 09/30/21  5:15 AM   Result Value Ref Range    Sodium 140 136 - 145 mmol/L    Potassium 4.3 3.5 - 5.5 mmol/L    Chloride 106 100 - 111 mmol/L    CO2 26 21 - 32 mmol/L    Anion gap 8 3.0 - 18 mmol/L    Glucose 54 (LL) 74 - 99 mg/dL    BUN 17 7.0 - 18 MG/DL    Creatinine 0.98 0.6 - 1.3 MG/DL    BUN/Creatinine ratio 17 12 - 20      GFR est AA >60 >60 ml/min/1.73m2    GFR est non-AA >60 >60 ml/min/1.73m2    Calcium 8.8 8.5 - 10.1 MG/DL   GLUCOSE, POC    Collection Time: 09/30/21  7:18 AM   Result Value Ref Range    Glucose (POC) 71 70 - 110 mg/dL   GLUCOSE, POC    Collection Time: 09/30/21  7:34 AM   Result Value Ref Range    Glucose (POC) 69 (L) 70 - 110 mg/dL   GLUCOSE, POC    Collection Time: 09/30/21  7:52 AM   Result Value Ref Range    Glucose (POC) 81 70 - 110 mg/dL   GLUCOSE, POC    Collection Time: 09/30/21 11:49 AM   Result Value Ref Range    Glucose (POC) 294 (H) 70 - 110 mg/dL       Assessment:     Primary Rehabilitation Diagnosis  1. Impaired Mobility and ADLs  2. Diabetic infection of right foot  2. S/P Incision and drainage, with decompression of multiple subfascial layers, right foot; Wound debridement, skin, soft tissue, and muscle, right foot (9/9/2021 - Dr. Torrey Lomeli)  3. S/P Incision and drainage of right foot (9/4/2021 - Dr. Ardena Moritz.)    Comorbidities  Patient Active Problem List   Diagnosis Code    Diabetic infection of right foot ([facility] E11.628, L08.9    Essential hypertension I10    Vitamin D insufficiency E55.9    Impaired mobility and ADLs Z74.09, Z78.9    Type 2 diabetes mellitus without complication, with long-term current use of insulin [facility] E11.9, Z79.4    COVID-19 ruled out by laboratory testing Z20.822    Status post incision and drainage Z98.890    History of incision and drainage Z98.890    Hyperthyroidism E05.90       Plan:     1.  Justification for continued stay: Good progression towards established rehabilitation goals.    2. Medical Issues being followed closely:    [x]  Fall and safety precautions     [x]  Wound Care     [x]  Bowel and Bladder Function     [x]  Fluid Electrolyte and Nutrition Balance     [x]  Pain Control      3. Issues that 24 hour rehabilitation nursing is following:    [x]  Fall and safety precautions     [x]  Wound Care     [x]  Bowel and Bladder Function     [x]  Fluid Electrolyte and Nutrition Balance     [x]  Pain Control      [x]  Assistance with and education on in-room safety with transfers to and from the bed, wheelchair, toilet and shower. 4. Acute rehabilitation plan of care:    [x]  Continue current care and rehab. [x]  Physical Therapy           [x]  Occupational Therapy           []  Speech Therapy     []  Hold Rehab until further notice     5. Medications:    [x]  MAR Reviewed     [x]  Continue Present Medications     6. DVT Prophylaxis:      []  Enoxaparin     [x]  Unfractionated Heparin     []  Warfarin     []  NOAC     []  WYATT Stockings     []  Sequential Compression Device     []  None     7. Code status    [x]  Full code     []  Partial code     []  Do not intubate     []  Do not resuscitate     8. Orders:   > Diabetic infection of right foot; S/P Incision and drainage, with decompression of multiple subfascial layers, right foot; Wound debridement, skin, soft tissue, and muscle, right foot (9/9/2021 - Dr. Dusty Morrell); S/P Incision and drainage of right foot (9/4/2021 - Dr. Paul Torres.)   > Nonweightbearing on the right foot   > Wound care nurse consult for WoundVac management   > Wound care instructions: Staff nurse to perform wound vac dressing changes Monday, Wednesday and fridays. Remove old dressing from right plantar foot wound and clean site  with wound spray. Apply skin prep  followed barrier ring then drapes.  Cut hole over wound, apply adaptic  to wound bed then pack wound with black foam. Applied drapes  followed by diskus.  Settings 125mmhg continuous.    > On 9/28/2021, patient had completed the recommended treatment course of Cephalexin 500 mg PO q 6 hr     > Essential hypertension   > Continue:    > Lisinopril 20 mg PO once daily (9AM)    > Metoprolol tartrate 25 mg PO q 12 hr (9AM, 9PM)    > Hydralazine 25 mg PO q 8 hr PRN for SBP greater than 170 mmHg    > Hyperthyroidism   > Continue Methimazole 10 mg PO once daily    > Type 2 diabetes mellitus without complication, with long-term current use of insulin   > HbA1c (9/3/2021) = 10.8   > On 9/25/2021, increased Insulin glargine from 15 units to 18 units SC q HS   > On 9/26/2021, started Insulin lispro 2 units SC TID AC   > On 9/28/2021, decreased Insulin glargine from 18 units to 16 units SC q HS   > On 9/29/2021, decreased insulin glargine from 16 units to 14 units SC q HS   > Continue:    > Change Insulin glargine from 14 units SC q HS to 12 units once daily before breakfast    > Insulin lispro 2 units SC TID AC    > Insulin lispro sliding scale Sc TID AC only    > Vitamin D insufficiency   > Vitamin D 25-Hydroxy (9/19/2021) = 24.0   > On 9/20/2021, started Cholecalciferol 5,000 units PO once daily   > Continue Cholecalciferol 5,000 units PO once daily    > COVID-19 ruled out by laboratory testing   > SARS-CoV-2 ([facility]) (9/8/2021):  Not detected   > Prior to admission and upon admission to the ARU, patient did not have any fever, desaturation, cough or difficulty breathing     > Analgesia   > Continue:    > Acetaminophen 650 mg PO q 4 hr PRN for pain level 4/10 or lesser    > Percocet 5/325 1-2 tabs PO q 6 hr PRN for pain level 5/10 or greater     > Diet:   > Specifications:     []  Low fat/Low cholesterol/High fiber/PATRICIA     []  Low fat/Low cholesterol/High fiber/2 gm Na     [x]  3 carb choices (45 gm/meal)     []  4 carb choices (60 gm/meal)     []  5 carb choices (75 gm/meal)     []  Other:      []  None      > Solids (consistency):    [x]  Regular     []  Easy to chew     [] Dysphagia - Soft & Bite-sized     []  Dysphagia - Minced & moist     []  Dysphagia - Pureed     []  Full liquid     []  Clear liquid      > Liquids (consistency):    []  Thin     []  Mildly thick (Nectar thick)     []  Moderately thick (Honey thick)     []  Extremely thick (Pudding thick)      > Fluid restriction: None       9. Personal Protective Equipment (NI82 face mask) was used while interacting with the patient. Patient was using a surgical mask. 10. Patient's progress in rehabilitation and medical issues discussed with the patient. All questions answered to the best of my ability. Care plan discussed with patient and nurse. 11. Total clinical care time is 30 minutes, including review of chart including all labs, radiology, past medical history, and discussion with patient. Greater than 50% of my time was spent in coordination of care and counseling.       Signed:    Nikita Jacinto MD    September 30, 2021

## 2021-09-30 NOTE — PROGRESS NOTES
Problem: Self Care Deficits Care Plan (Adult)  Goal: *Acute Goals and Plan of Care (Insert Text)  Description: Occupational Therapy Goals   Long Term Goals  Initiated 2021 and to be accomplished within 3 week(s), by 10/10/2021. 1. Pt will perform self-feeding with I.  2. Pt will perform grooming with I.  3. Pt will perform UB bathing with Mod I and use of AE PRN. 4. Pt will perform LB bathing with Mod I and use of AE PRN. 5. Pt will perform tub/shower transfer with supv and use of LRAD. 6. Pt will perform UB dressing with Mod I.  7. Pt will perform LB dressing with supv and use of AE PRN. 8. Pt will perform toileting task with Mod I.  9. Pt will perform toilet transfer with supv and use of LRAD. Short Term Goals   Initiated 2021 and to be accomplished within 7 day(s), updated 2021. 1. Pt will perform self-feeding with Mod I. ( 2021)  2. Pt will perform grooming with set-up. ( 2021)  3. Pt will perform UB bathing with SBA and use of AE PRN. ( 2021)  4. Pt will perform LB bathing with Min A and use of AE PRN. ( 2021)  5. Pt will perform tub/shower transfer with Min A and use of LRAD. 6. Pt will perform UB dressing with SBA. ( 2021)  7. Pt will perform LB dressing with Mod A and use of AE PRN. ( 2021)  8. Pt will perform toileting task with Mod A. ( 2021)  9. Pt will perform toilet transfer with CGA and use of LRAD. ( 2021)  Occupational Therapy TREATMENT    Patient: Peter Guevara   60 y.o. Patient identified with name and : yes    Date: 2021    First Tx Session  Time In: 0755  Time Out[de-identified] 4719    Second Tx Session  Time In: 1430  Time Out[de-identified] 1450    Diagnosis: Diabetic foot infection ([address] [E11.628, L08.9]   Precautions: Fall, NWB  Chart, occupational therapy assessment, plan of care, and goals were reviewed. Pain:  Pt reports 1/10 pain or discomfort prior to treatment.     Pt reports 1/10 pain or discomfort post treatment. Intervention Provided: N/A      SUBJECTIVE:   Patient stated It's the wound vac    OBJECTIVE DATA SUMMARY:     THERAPEUTIC ACTIVITY Daily Assessment    Design Pattern Task in standing to increase balance and problem solving for ADLs. Pt able to stand up to 12 minutes but had increased difficulty to put together a 4 block lined  pattern. Pt required 50% assist to sequence organize completion. FEEDING/EATING Daily Assessment    Feeding/Eating  Feeding/Eating Assistance: (P) 7 (Independent)     GROOMING Daily Assessment    Grooming  Grooming Assistance : (P) 5 (Supervision)  Comments: (P)  (Pt washed face )     UPPER BODY BATHING Daily Assessment    Upper Body Bathing  Bathing Assistance, Upper: (P) 5 (Supervision)  Position Performed: (P) Seated in chair  Comments: (P)  pt washed UB seated at sink     LOWER BODY BATHING Daily Assessment    Lower Body Bathing  Bathing Assistance, Lower : (P) 5 (Supervision)  Position Performed: (P) Seated in chair;Standing  Comments: (P)  (pt leaned side to side to wash kim/buttock)     TOILETING Daily Assessment     Ulices-simulation of toileting performed and clinical judgement made      UPPER BODY DRESSING Daily Assessment    Upper Body Dressing   Dressing Assistance : (P) 5 (Supervision)  Comments: (P)  pt doffed/donned pullover shirt seated at sink     LOWER BODY DRESSING Daily Assessment    Lower Body Dressing   Dressing Assistance : (P) 5 (Stand-by assistance)  Leg Crossed Method Used: (P) No  Adaptive Equipment Used: (P) Walker     MOBILITY/TRANSFERS Daily Assessment    Functional Transfers  Toilet Transfer : (P) Stand pivot transfer with walker  Amount of Assistance Required: (P) 4 (Contact guard assistance)  Amount of Assistance Required: (P) Tub shower transfer deferred to [facility]    EOB to w/ with CGA and moderate asst to problem solve managing wound vac cord. ASSESSMENT:  Pt showing [address] with Self Care.   Pt limited by wound vac regarding I with ADLs at this time. Pt appears to have difficulty sequencing/problem solving unfamiliar task. Pt/wife  noted to be re educated again about having staff in room for any functional transfers. Pt wife verbalized understanding. Progression toward goals:  [x]          Improving appropriately and progressing toward goals  []          Improving slowly and progressing toward goals  []          Not making progress toward goals and plan of care will be adjusted     PLAN:  Patient continues to benefit from skilled intervention to address the above impairments. Continue treatment per established plan of care. Discharge Recommendations:  Home Health with asst  Further Equipment Recommendations for Discharge:  bedside commode     Activity Tolerance:  Fair         Estimated LOS:    Please refer to the flowsheet for vital signs taken during this treatment. After treatment:   [x]  Patient left in no apparent distress sitting up in chair   []  Patient left in no apparent distress in bed  []  Call bell left within reach  []  Nursing notified  []  Caregiver present  []  Bed alarm activated    COMMUNICATION/EDUCATION:   [] Home safety education was provided and the patient/caregiver indicated understanding. [x] Patient/family have participated as able in goal setting and plan of care. [] Patient/family agree to work toward stated goals and plan of care. [] Patient understands intent and goals of therapy, but is neutral about his/her participation. [] Patient is unable to participate in goal setting and plan of care.       Ruby Soto, OT     Outcome: Progressing Towards Goal  Goal: Interventions  Outcome: Progressing Towards Goal

## 2021-10-01 LAB
GLUCOSE BLD STRIP.AUTO-MCNC: 136 MG/DL (ref 70–110)
GLUCOSE BLD STRIP.AUTO-MCNC: 145 MG/DL (ref 70–110)
GLUCOSE BLD STRIP.AUTO-MCNC: 220 MG/DL (ref 70–110)
GLUCOSE BLD STRIP.AUTO-MCNC: 352 MG/DL (ref 70–110)
GLUCOSE BLD STRIP.AUTO-MCNC: 356 MG/DL (ref 70–110)

## 2021-10-01 PROCEDURE — 74011250637 HC RX REV CODE- 250/637: Performed by: FAMILY MEDICINE

## 2021-10-01 PROCEDURE — 97116 GAIT TRAINING THERAPY: CPT

## 2021-10-01 PROCEDURE — 65310000000 HC RM PRIVATE REHAB

## 2021-10-01 PROCEDURE — 74011250636 HC RX REV CODE- 250/636: Performed by: INTERNAL MEDICINE

## 2021-10-01 PROCEDURE — 99232 SBSQ HOSP IP/OBS MODERATE 35: CPT | Performed by: INTERNAL MEDICINE

## 2021-10-01 PROCEDURE — 74011250637 HC RX REV CODE- 250/637: Performed by: INTERNAL MEDICINE

## 2021-10-01 PROCEDURE — 97530 THERAPEUTIC ACTIVITIES: CPT

## 2021-10-01 PROCEDURE — 74011636637 HC RX REV CODE- 636/637: Performed by: EMERGENCY MEDICINE

## 2021-10-01 PROCEDURE — 74011250637 HC RX REV CODE- 250/637: Performed by: EMERGENCY MEDICINE

## 2021-10-01 PROCEDURE — 74011636637 HC RX REV CODE- 636/637: Performed by: INTERNAL MEDICINE

## 2021-10-01 PROCEDURE — 82962 GLUCOSE BLOOD TEST: CPT

## 2021-10-01 RX ADMIN — Medication 5000 UNITS: at 08:37

## 2021-10-01 RX ADMIN — METOPROLOL TARTRATE 50 MG: 50 TABLET, FILM COATED ORAL at 08:37

## 2021-10-01 RX ADMIN — METHIMAZOLE 10 MG: 10 TABLET ORAL at 08:37

## 2021-10-01 RX ADMIN — INSULIN LISPRO 2 UNITS: 100 INJECTION, SOLUTION INTRAVENOUS; SUBCUTANEOUS at 12:33

## 2021-10-01 RX ADMIN — INSULIN LISPRO 2 UNITS: 100 INJECTION, SOLUTION INTRAVENOUS; SUBCUTANEOUS at 08:34

## 2021-10-01 RX ADMIN — INSULIN LISPRO 8 UNITS: 100 INJECTION, SOLUTION INTRAVENOUS; SUBCUTANEOUS at 08:35

## 2021-10-01 RX ADMIN — HEPARIN SODIUM 5000 UNITS: 5000 INJECTION INTRAVENOUS; SUBCUTANEOUS at 17:56

## 2021-10-01 RX ADMIN — LISINOPRIL 20 MG: 20 TABLET ORAL at 08:37

## 2021-10-01 RX ADMIN — DOCUSATE SODIUM 100 MG: 100 CAPSULE, LIQUID FILLED ORAL at 08:37

## 2021-10-01 RX ADMIN — DOCUSATE SODIUM 100 MG: 100 CAPSULE, LIQUID FILLED ORAL at 17:56

## 2021-10-01 RX ADMIN — HEPARIN SODIUM 5000 UNITS: 5000 INJECTION INTRAVENOUS; SUBCUTANEOUS at 08:35

## 2021-10-01 RX ADMIN — INSULIN GLARGINE 12 UNITS: 100 INJECTION, SOLUTION SUBCUTANEOUS at 08:33

## 2021-10-01 RX ADMIN — METOPROLOL TARTRATE 50 MG: 50 TABLET, FILM COATED ORAL at 22:06

## 2021-10-01 RX ADMIN — HEPARIN SODIUM 5000 UNITS: 5000 INJECTION INTRAVENOUS; SUBCUTANEOUS at 00:30

## 2021-10-01 RX ADMIN — POLYETHYLENE GLYCOL 3350 17 G: 17 POWDER, FOR SOLUTION ORAL at 09:00

## 2021-10-01 RX ADMIN — FAMOTIDINE 20 MG: 20 TABLET, FILM COATED ORAL at 08:37

## 2021-10-01 RX ADMIN — FAMOTIDINE 20 MG: 20 TABLET, FILM COATED ORAL at 17:56

## 2021-10-01 RX ADMIN — INSULIN LISPRO 2 UNITS: 100 INJECTION, SOLUTION INTRAVENOUS; SUBCUTANEOUS at 17:56

## 2021-10-01 NOTE — PROGRESS NOTES
Problem: Mobility Impaired (Adult and Pediatric)  Goal: *Therapy Goal (Edit Goal, Insert Text)  Description: Physical Therapy Short Term Goals  Initiated 9/20/2021, re-assessed 9/27/21, and to be accomplished within 7 day(s) [10/04/21]  1. Patient will move from supine to sit and sit to supine  and roll side to side in bed with modified independence. (met 9/27)   Goal updated: pt will perform bed mobility with independence without side rails on bed  2. Patient will transfer from bed to chair and chair to bed with Stand-by assistance using the least restrictive device, NWB R LE. (met 9/27, with use of RW)   Goal updated: pt will transfer from bed to chair and chair to bed with Supervision using a RW and NWB R LE.  3.  Patient will perform sit to stand with Stand-by assistance to RW and NWB R LE. (met 9/27/21)   Goals updated: pt will perform sit to stand with supervision to RW and NWB R LE.  4.  (Goal revised) Patient will ambulate with contact guard assist for 50 feet with RW, NWB R LE and hop method. (met 9/27/21)   Goal updated: pt will ambulate 70 ft with RW and SBA on level surfaces, NWB R LE.  5.  Patient will propel w/c 150 ft with supervision on level surfaces using B UE's and L LE. (met 9/27/21)   Goal updated: pt will propel w/c 150 ft with supervision on uneven surfaces outside. Physical Therapy Long Term Goals  Initiated 9/20/2021 and to be accomplished within 21 day(s) [10/11/21]  1. Patient will transfer from bed to chair and chair to bed with modified independence using the least restrictive device. 2.  Patient will perform sit to stand with modified independence and RW. 3.  Patient will ambulate with supervision for 80 feet with RW, NWB R LE, and hop method. (to manage household distances)  4.   Patient will ascend/descend 4 stairs with one handrail(s) with minimal assistance/contact guard assist.     Outcome: Progressing Towards Goal   PHYSICAL THERAPY TREATMENT    Patient: Phillip Garza (57 y.o. male)  Date: 10/1/2021  Diagnosis: Diabetic foot infection ([facility] [E11.628, L08.9] Diabetic infection of right foot ([facility]  Precautions: Fall, NWB  Chart, physical therapy assessment, plan of care and goals were reviewed. Time In:1015  Time Out:1100    Patient seen for: AM;Gait training;Patient education; Therapeutic exercise   Time In: 15:30  Time out: 16:30  Patient seen for: PM; Gait training, transfer training    Pain:  No pain reports this am or pm sessions    Patient identified with name and :Yes    SUBJECTIVE:      AM-Sure, I would like to have therapy a little earlier today. PM-Let' get going down to the gym, I am ready. OBJECTIVE DATA SUMMARY:    Objective:     TRANSFERS Daily Assessment     Transfer Type: Other  Other: stand step with RW  Transfer Assistance : 5 (Stand-by assistance)  Sit to Stand Assistance: Stand-by assistance  Car Transfers: Minimum assistance  Car Type: car simulator Patient required minimal assistance with management of tubing with wound vac and safe technique with getting in the car simulator. GAIT Daily Assessment    Gait Description (WDL) Exceptions to WDL    Gait Abnormalities Decreased step clearance    Assistive device Walker, rolling    Ambulation assistance - level surface 5 (Supervision/setup)    Distance  (140 ft)    Ambulation assistance- uneven surface  Not tested    Comments Patient ambulated 140 feet this am session with slow pace 2/2 patient was asked to manage the wound vac tubing during ambulation challenge Patient is able to adequately manage the tubing this session with additional time to trouble shoot best method. PM-session-patient had increased forgetfulness with simple commands, patient was asked to follow therapist down to the gym in his wheelchair and he locked his brakes, removed his foot rest on right side and then began to appear confused as to the therapist's instructions.  Patient was given re-direction and he reported understanding of commands to follow therapist to the gym in the wheelchair. BALANCE Daily Assessment     Sitting - Static: Good (unsupported)  Sitting - Dynamic: Good (unsupported)  Standing - Static: Fair  Standing - Dynamic : Impaired        WHEELCHAIR MOBILITY Daily Assessment     Able to Propel (ft):  (166 ft)  Functional Level:  (5)  Wheelchair Setup Assist Required : 4 (Contact guard assistance) (with right foot rest release)  Wheelchair Management: Manages left brake;Manages right brake   Patient was challenged this PM session with negotiation of inclines with supervision and with minimal assistance entering and exiting entry/exit doorway. THERAPEUTIC EXERCISES Daily Assessment       AM-seated bilateral LE hip flexion, LAQs, abduction x 15 reps each; 1 set. Standing-Right LE; marches, ham curls, hip flexion, abduction, extension x 15 reps each; 1 set. ASSESSMENT:  Patient is seen for deficits in strength, mobility, transfers, ambulation, safety and balance. Patient is agreeable to begin session earlier than planned. Patient is making gains in the AM with remembering safety awareness techniques, however he is noted to have decreased safety awareness this PM session. He appears to become slightly confused when he is given instructions that he has been given numerous times in the past. Continue to monitor safety deficits for improved functional independence. Progression toward goals:  []      Improving appropriately and progressing toward goals  [x]      Improving slowly and progressing toward goals  []      Not making progress toward goals and plan of care will be adjusted      PLAN:  Patient continues to benefit from skilled intervention to address the above impairments. Continue treatment per established plan of care.   Discharge Recommendations:  Home health with 24 hr supervision  Further Equipment Recommendations for Discharge: RW, 18 inch WC with removable arm rests, and elevating leg rests, gel cushion and anti tippers. Estimated Discharge Date: 10/8/2021    Activity Tolerance: Tolerates AM session well. Increased forgetfulness this PM session. Please refer to the flowsheet for vital signs taken during this treatment.     After treatment:   [] Patient left in no apparent distress in bed  [x] Patient left in no apparent distress sitting up in chair  [] Patient left in no apparent distress in w/c mobilizing under own power  [] Patient left in no apparent distress dining area  [] Patient left in no apparent distress mobilizing under own power  [x] Call bell left within reach  [x] Nursing notified  [] Caregiver present  [] Bed alarm activated   [x] Chair alarm activated      Rober Cortes  10/1/2021

## 2021-10-01 NOTE — PROGRESS NOTES
Problem: Self Care Deficits Care Plan (Adult)  Goal: *Acute Goals and Plan of Care (Insert Text)  Description: Occupational Therapy Goals   Long Term Goals  Initiated 2021 and to be accomplished within 3 week(s), by 10/10/2021. 1. Pt will perform self-feeding with I.  2. Pt will perform grooming with I.  3. Pt will perform UB bathing with Mod I and use of AE PRN. 4. Pt will perform LB bathing with Mod I and use of AE PRN. 5. Pt will perform tub/shower transfer with supv and use of LRAD. 6. Pt will perform UB dressing with Mod I.  7. Pt will perform LB dressing with supv and use of AE PRN. 8. Pt will perform toileting task with Mod I.  9. Pt will perform toilet transfer with supv and use of LRAD. Short Term Goals   Initiated 2021 and to be accomplished within 7 day(s), updated 2021. 1. Pt will perform self-feeding with Mod I. ( 2021)  2. Pt will perform grooming with set-up. ( 2021)  3. Pt will perform UB bathing with SBA and use of AE PRN. ( 2021)  4. Pt will perform LB bathing with Min A and use of AE PRN. ( 2021)  5. Pt will perform tub/shower transfer with Min A and use of LRAD. 6. Pt will perform UB dressing with SBA. ( 2021)  7. Pt will perform LB dressing with Mod A and use of AE PRN. ( 2021)  8. Pt will perform toileting task with Mod A. ( 2021)  9. Pt will perform toilet transfer with CGA and use of LRAD. ( 2021)      Outcome: Progressing Towards Goal   Occupational Therapy TREATMENT    Patient: Giulinaa Sarkar   60 y.o. Patient identified with name and : yes    Date: 10/1/2021    First Tx Session  Time In: 1  Time Out[de-identified] 1500      Diagnosis: Diabetic foot infection ([facility] [E11.628, L08.9]   Precautions: Fall, NWB  Chart, occupational therapy assessment, plan of care, and goals were reviewed. Pain:  Pt reports 0/10 pain or discomfort prior to treatment. Pt reports 0/10 pain or discomfort post treatment. Intervention Provided: NA      SUBJECTIVE:   Patient stated I need to get back to work.     OBJECTIVE DATA SUMMARY:     THERAPEUTIC ACTIVITY Daily Assessment    Pt participated in Block Design activity to promote visual discrimination, promote ability to perceive spatial relationships, promote visual memory, and promote FM development. Pt assembled 4-block pattern (x5), using template with lines for 3 patterns and template without lines for 2 patterns. Pt requiring extra time to complete patterns without lines. Pt assembled 9-block pattern (x6), using template with lines for 4 patterns and template without lines for 2 patterns. Pt had greater difficulty assembling patterns without lines, requiring extra time and moderate verbal cues for 9-block patterns. For increased challenge, pt had 2# weights attached to wrist.    Pt participated in standing activity to challenge standing balance and to increase activity tolerance. Pt stood at raised table top, using RW for safety, to play the game Connect 4. Pt used right UE to stabilize himself on RW and used left UE to place game pieces. Pt tolerated standing for 10:59 before requesting a seated rest break. For increased challenge, pt had 2# weights attached to wrist.    Pt participated in reaching activity to challenge sitting balance and to increase core strength. Pt played the game CHRISTIANA, having to determine play strategy, was required to reach forward (outside LORENA) to retrieve/discard cards. Pt did require multiple verbal cues for correct game play. For increased challenge, 2# weights were attached to wrist.     MOBILITY/TRANSFERS Daily Assessment    Pt self-propelled w/c room <> gym Mod I.       ASSESSMENT:  Pt making progress with standing tolerance and increasing activity tolerance. Pt continues demonstrate decreased difficulty with problem solving/sequencing when presented with new/uniique activity.   Progression toward goals:  [x]          Improving appropriately and progressing toward goals  []          Improving slowly and progressing toward goals  []          Not making progress toward goals and plan of care will be adjusted     PLAN:  Patient continues to benefit from skilled intervention to address the above impairments. Continue treatment per established plan of care. Discharge Recommendations:  Home Health with assistance  Further Equipment Recommendations for Discharge:  bedside commode     Activity Tolerance:  Fair+      Estimated LOS:10/8/2021    Please refer to the flowsheet for vital signs taken during this treatment. After treatment:   [x]  Patient left in no apparent distress sitting up in chair   []  Patient left in no apparent distress in bed  [x]  Call bell left within reach  []  Nursing notified  []  Caregiver present  []  Bed alarm activated    COMMUNICATION/EDUCATION:   [] Home safety education was provided and the patient/caregiver indicated understanding. [] Patient/family have participated as able in goal setting and plan of care. [x] Patient/family agree to work toward stated goals and plan of care. [] Patient understands intent and goals of therapy, but is neutral about his/her participation. [] Patient is unable to participate in goal setting and plan of care.       MARIKA Abarca/ARLINE

## 2021-10-01 NOTE — PROGRESS NOTES
Problem: Pressure Injury - Risk of  Goal: *Prevention of pressure injury  Description: Document Sraabjit Scale and appropriate interventions in the flowsheet. Outcome: Progressing Towards Goal  Note: Pressure Injury Interventions:  Sensory Interventions: Assess changes in LOC, Pressure redistribution bed/mattress (bed type)         Activity Interventions: Pressure redistribution bed/mattress(bed type)    Mobility Interventions: Pressure redistribution bed/mattress (bed type), HOB 30 degrees or less, Float heels    Nutrition Interventions: Document food/fluid/supplement intake    Friction and Shear Interventions: HOB 30 degrees or less                Problem: Patient Education: Go to Patient Education Activity  Goal: Patient/Family Education  Outcome: Progressing Towards Goal     Problem: Falls - Risk of  Goal: *Absence of Falls  Description: Document Willy Fall Risk and appropriate interventions in the flowsheet.   Outcome: Progressing Towards Goal  Note: Fall Risk Interventions:  Mobility Interventions: Bed/chair exit alarm, Patient to call before getting OOB    Mentation Interventions: Adequate sleep, hydration, pain control, Bed/chair exit alarm    Medication Interventions: Bed/chair exit alarm, Patient to call before getting OOB    Elimination Interventions: Bed/chair exit alarm, Call light in reach, Patient to call for help with toileting needs              Problem: Patient Education: Go to Patient Education Activity  Goal: Patient/Family Education  Outcome: Progressing Towards Goal     Problem: Patient Education: Go to Patient Education Activity  Goal: Patient/Family Education  Outcome: Progressing Towards Goal     Problem: Patient Education: Go to Patient Education Activity  Goal: Patient/Family Education  Outcome: Progressing Towards Goal     Problem: Diabetes Self-Management  Goal: *Disease process and treatment process  Description: Define diabetes and identify own type of diabetes; list 3 options for treating diabetes. Outcome: Progressing Towards Goal  Goal: *Incorporating nutritional management into lifestyle  Description: Describe effect of type, amount and timing of food on blood glucose; list 3 methods for planning meals. Outcome: Progressing Towards Goal  Goal: *Incorporating physical activity into lifestyle  Description: State effect of exercise on blood glucose levels. Outcome: Progressing Towards Goal  Goal: *Developing strategies to promote health/change behavior  Description: Define the ABC's of diabetes; identify appropriate screenings, schedule and personal plan for screenings. Outcome: Progressing Towards Goal  Goal: *Using medications safely  Description: State effect of diabetes medications on diabetes; name diabetes medication taking, action and side effects. Outcome: Progressing Towards Goal  Goal: *Monitoring blood glucose, interpreting and using results  Description: Identify recommended blood glucose targets  and personal targets. Outcome: Progressing Towards Goal  Goal: *Prevention, detection, treatment of acute complications  Description: List symptoms of hyper- and hypoglycemia; describe how to treat low blood sugar and actions for lowering  high blood glucose level. Outcome: Progressing Towards Goal  Goal: *Prevention, detection and treatment of chronic complications  Description: Define the natural course of diabetes and describe the relationship of blood glucose levels to long term complications of diabetes.   Outcome: Progressing Towards Goal  Goal: *Developing strategies to address psychosocial issues  Description: Describe feelings about living with diabetes; identify support needed and support network  Outcome: Progressing Towards Goal  Goal: *Sick day guidelines  Outcome: Progressing Towards Goal     Problem: Patient Education: Go to Patient Education Activity  Goal: Patient/Family Education  Outcome: Progressing Towards Goal

## 2021-10-01 NOTE — ROUTINE PROCESS
SHIFT CHANGE NOTE FOR [facility]    Bedside and Verbal shift change report given to Adri Yap RN (oncoming nurse) by Theodora Grigsby RN (offgoing nurse). Report included the following information SBAR, Kardex, MAR and Recent Results. Situation:   Code Status: Full Code   Hospital Day: 13   Problem List:   Hospital Problems  Date Reviewed: 9/30/2021        Codes Class Noted POA    Essential hypertension (Chronic) ICD-10-CM: I10  ICD-9-CM: 401.9  Unknown Yes        Type 2 diabetes mellitus without complication, with long-term current use of insulin (HCC) (Chronic) ICD-10-CM: E11.9, Z79.4  ICD-9-CM: 250.00, V58.67  Unknown Yes    Overview Signed 9/20/2021 11:13 PM by Marty Rico MD     HbA1c (9/3/2021) = 10.8             Hyperthyroidism (Chronic) ICD-10-CM: E05.90  ICD-9-CM: 242.90  Unknown Yes        Vitamin D insufficiency (Chronic) ICD-10-CM: E55.9  ICD-9-CM: 268.9  9/19/2021 Yes    Overview Signed 9/20/2021 11:11 PM by Marty Rico MD     Vitamin D 25-Hydroxy (9/19/2021) = 24.0             Status post incision and drainage ICD-10-CM: Z98.890  ICD-9-CM: V45.89  9/9/2021 Yes    Overview Signed 9/20/2021 11:18 PM by Marty Rico MD     S/P Incision and drainage, with decompression of multiple subfascial layers, right foot; Wound debridement, skin, soft tissue, and muscle, right foot (9/9/2021 - Dr. Grover Pate)             IVRLM-06 ruled out by laboratory testing ICD-10-CM: Z20.822  ICD-9-CM: V01.79  9/8/2021 Yes    Overview Signed 9/20/2021 11:14 PM by Marty Rico MD     SARS-CoV-2 ([address] [place] [address]) (9/8/2021):  Not detected             History of incision and drainage ICD-10-CM: Z98.890  ICD-9-CM: V45.89  9/4/2021 Yes    Overview Signed 9/20/2021 11:18 PM by Marty Rico MD     S/P Incision and drainage of right foot (9/4/2021 - Dr. Ethan Litten.)             * (Principal) Diabetic infection of right foot [facility]) ICD-10-CM: E11.628, L08.9  ICD-9-CM: 250.80, 686.9  9/2/2021 Yes Impaired mobility and ADLs ICD-10-CM: Z74.09, Z78.9  ICD-9-CM: V49.89  9/2/2021 Yes              Background:   Past Medical History:   Past Medical History:   Diagnosis Date    COVID-19 ruled out by laboratory testing 9/8/2021    [facility] (9/8/2021):  Not detected    Diabetic infection of right foot ([address] 9/2/2021    Essential hypertension     Hyperthyroidism     Type 2 diabetes mellitus without complication, with long-term current use of insulin ([facility])     HbA1c (9/3/2021) = 10.8    Vitamin D insufficiency 9/19/2021    Vitamin D 25-Hydroxy (9/19/2021) = 24.0        Assessment:   Changes in Assessment throughout shift: No change to previous assessment     Patient has a central line: no Reasons if yes: na  Insertion date:na Last dressing date:na   Patient has Dennis Cath: no Reasons if yes: na   Insertion date:na  Shift dennis care completed: no     Last Vitals:     Vitals:    09/29/21 2043 09/30/21 0800 09/30/21 1557 09/30/21 2113   BP: 120/70 128/71 115/66 133/76   Pulse: 80 80 78 87   Resp: 18 18 15 18   Temp: 97 °F (36.1 °C) 98 °F (36.7 °C) 98.4 °F (36.9 °C) 98.8 °F (37.1 °C)   SpO2: 100% 100% 100% 100%   Weight:       Height:            PAIN    Pain Assessment    Pain Intensity 1: 0 (10/01/21 0400) Pain Intensity 1: 2 (12/29/14 1105)    Pain Location 1: Foot Pain Location 1: Abdomen    Pain Intervention(s) 1: Medication (see MAR) (premedicated for dressing change) Pain Intervention(s) 1: Medication (see MAR)  Patient Stated Pain Goal: 0 Patient Stated Pain Goal: 0  o Intervention effective: yes  o Other actions taken for pain: Medication (see MAR) (premedicated for dressing change)     Skin Assessment  Skin color Skin Color: Appropriate for ethnicity  Condition/Temperature Skin Condition/Temp: Dry, Warm  Integrity Skin Integrity: Incision (comment)  Turgor Turgor: Non-tenting  Weekly Pressure Ulcer Documentation  Pressure  Injury Documentation: No Pressure Injury Noted-Pressure Ulcer Prevention Initiated  Wound Prevention & Protection Methods  Orientation of wound Orientation of Wound Prevention: Posterior  Location of Prevention Location of Wound Prevention: Buttocks, Sacrum/Coccyx  Dressing Present Dressing Present : No  Dressing Status    Wound Offloading Wound Offloading (Prevention Methods): Bed, pressure redistribution/air     INTAKE/OUPUT  Date 09/30/21 0700 - 10/01/21 0659 10/01/21 0700 - 10/02/21 0659   Shift 0700-1859 1900-0659 24 Hour Total 0700-1859 2528-8811 24 Hour Total   INTAKE   P.O. 1320  1320        P. O. 1320  1320      Shift Total(mL/kg) 1320(13.8)  1320(13.8)      OUTPUT   Urine(mL/kg/hr) 500(0.4)  500        Urine Voided 500  500        Urine Occurrence(s) 3 x 2 x 5 x      Stool           Stool Occurrence(s) 0 x 0 x 0 x      Shift Total(mL/kg) 500(5.2)  500(5.2)        820      Weight (kg) 96 96 96 96 96 96       Recommendations:  1. Patient needs and requests: meds,wound vac dressing change,toileting  2. Pending tests/procedures: na     3. Functional Level/Equipment: Partial (one person) /      Fall Precautions:   Fall risk precautions were reinforced with the patient; he was instructed to call for help prior to getting up. The following fall risk precautions were continued: bed/ chair alarms, door signage, yellow bracelet and socks as well as update of the [employer] tool in the patient's room. Willy Score: 3    HEALS Safety Check    A safety check occurred in the patient's room between off going nurse and oncoming nurse listed above.     The safety check included the below items  Area Items   H  High Alert Medications - Verify all high alert medication drips (heparin, PCA, etc.)   E  Equipment - Suction is set up for ALL patients (with yanker)  - Red plugs utilized for all equipment (IV pumps, etc.)  - WOWs wiped down at end of shift.  - Room stocked with oxygen, suction, and other unit-specific supplies   A  Alarms - Bed alarm is set for fall risk patients  - Ensure chair alarm is in place and activated if patient is up in a chair   L  Lines - Check IV for any infiltration  - Bal bag is empty if patient has a Bal   - Tubing and IV bags are labeled   S  Safety   - Room is clean, patient is clean, and equipment is clean. - Hallways are clear from equipment besides carts. - Fall bracelet on for fall risk patients  - Ensure room is clear and free of clutter  - Suction is set up for ALL patients (with yanker)  - Hallways are clear from equipment besides carts.    - Isolation precautions followed, supplies available outside room, sign posted     Theodora Grigsby RN

## 2021-10-01 NOTE — PROGRESS NOTES
[facility] PHYSICAL REHABILITATION  [address] [place] [address], [address]     INPATIENT REHABILITATION  DAILY PROGRESS NOTE     Date: 10/1/2021    Name: Lisa Hutsonr Age / Sex: 60 y.o. / male   CSN: [id number] MRN: [id number]   [facility] Date: 9/18/2021 Length of Stay: 13 days     Primary Rehab Diagnosis: Impaired Mobility and ADLs secondary to:  1. Diabetic infection of right foot  2. S/P Incision and drainage, with decompression of multiple subfascial layers, right foot; Wound debridement, skin, soft tissue, and muscle, right foot (9/9/2021 - Dr. Pilo Somers)  3. S/P Incision and drainage of right foot (9/4/2021 - Dr. Silvina Bartlett)      Subjective:     Patient seen and examined. Blood pressure controlled. No documented fever since admission. Blood glucose fairly controlled. Patient's Complaint:   No significant medical complaints    Pain Control: stable, mild-to-moderate joint symptoms intermittently, reasonably well controlled by current meds      Objective:     Vital Signs:  Patient Vitals for the past 24 hrs:   BP Temp Pulse Resp SpO2   10/01/21 0756 133/74 98.9 °F (37.2 °C) 86 16 100 %   09/30/21 2113 133/76 98.8 °F (37.1 °C) 87 18 100 %   09/30/21 1557 115/66 98.4 °F (36.9 °C) 78 15 100 %        Physical Examination:  GENERAL SURVEY: Patient is awake, alert, oriented x 3, sitting comfortably on the chair, not in acute respiratory distress.   HEENT: pink palpebral conjunctivae, anicteric sclerae, no nasoaural discharge, moist oral mucosa  NECK: supple, no jugular venous distention, no palpable lymph nodes  CHEST/LUNGS: symmetrical chest expansion, good air entry, clear breath sounds  HEART: adynamic precordium, good S1 S2, no S3, regular rhythm, no murmurs  ABDOMEN: flat, bowel sounds appreciated, soft, non-tender  EXTREMITIES: (+) right foot/ankle wrapped in ACE wrap, pink nailbeds, no edema, full and equal pulses, no calf tenderness   NEUROLOGICAL EXAM: The patient is awake, alert and oriented x 3, able to answer questions fairly appropriately, able to follow 1 and 2 step commands. Able to tell time from the wall clock. Cranial nerves II-XII are grossly intact. No gross sensory deficit. Motor strength is 4 to 4+/5 on BUE and LLE, 4- to 4/5 on the RLE.        Current Medications:  Current Facility-Administered Medications   Medication Dose Route Frequency    insulin glargine (LANTUS) injection 12 Units  12 Units SubCUTAneous ACB    insulin lispro (HUMALOG) injection 2 Units  2 Units SubCUTAneous TIDAC    metoprolol tartrate (LOPRESSOR) tablet 50 mg  50 mg Oral Q12H    polyethylene glycol (MIRALAX) packet 17 g  17 g Oral DAILY    insulin lispro (HUMALOG) injection   SubCUTAneous TIDAC    heparin (porcine) injection 5,000 Units  5,000 Units SubCUTAneous Q8H    lisinopriL (PRINIVIL, ZESTRIL) tablet 20 mg  20 mg Oral DAILY    methIMAzole (TAPAZOLE) tablet 10 mg  10 mg Oral DAILY    oxyCODONE-acetaminophen (PERCOCET) 5-325 mg per tablet 1-2 Tablet  1-2 Tablet Oral Q6H PRN    acetaminophen (TYLENOL) tablet 650 mg  650 mg Oral Q4H PRN    cholecalciferol (VITAMIN D3) capsule 5,000 Units  5,000 Units Oral DAILY    famotidine (PEPCID) tablet 20 mg  20 mg Oral BID    docusate sodium (COLACE) capsule 100 mg  100 mg Oral BID    magnesium hydroxide (MILK OF MAGNESIA) 400 mg/5 mL oral suspension 30 mL  30 mL Oral DAILY PRN    bisacodyL (DULCOLAX) tablet 10 mg  10 mg Oral Q48H PRN    glucose chewable tablet 16 g  4 Tablet Oral PRN    glucagon (GLUCAGEN) injection 1 mg  1 mg IntraMUSCular PRN    dextrose (D50W) injection syrg 12.5-25 g  25-50 mL IntraVENous PRN    hydrALAZINE (APRESOLINE) tablet 25 mg  25 mg Oral Q8H PRN       Allergies:  No Known Allergies      Lab/Data Review:  Recent Results (from the past 24 hour(s))   GLUCOSE, POC    Collection Time: 09/30/21  4:48 PM   Result Value Ref Range    Glucose (POC) 63 (L) 70 - 110 mg/dL   GLUCOSE, POC    Collection Time: 09/30/21  5:03 PM Result Value Ref Range    Glucose (POC) 74 70 - 110 mg/dL   GLUCOSE, POC    Collection Time: 09/30/21  5:18 PM   Result Value Ref Range    Glucose (POC) 77 70 - 110 mg/dL   GLUCOSE, POC    Collection Time: 09/30/21  5:38 PM   Result Value Ref Range    Glucose (POC) 94 70 - 110 mg/dL   GLUCOSE, POC    Collection Time: 09/30/21  8:29 PM   Result Value Ref Range    Glucose (POC) 217 (H) 70 - 110 mg/dL   GLUCOSE, POC    Collection Time: 10/01/21  7:42 AM   Result Value Ref Range    Glucose (POC) 356 (H) 70 - 110 mg/dL   GLUCOSE, POC    Collection Time: 10/01/21  8:07 AM   Result Value Ref Range    Glucose (POC) 352 (H) 70 - 110 mg/dL   GLUCOSE, POC    Collection Time: 10/01/21 11:47 AM   Result Value Ref Range    Glucose (POC) 145 (H) 70 - 110 mg/dL       Assessment:     Primary Rehabilitation Diagnosis  1. Impaired Mobility and ADLs  2. Diabetic infection of right foot  2. S/P Incision and drainage, with decompression of multiple subfascial layers, right foot; Wound debridement, skin, soft tissue, and muscle, right foot (9/9/2021 - Dr. Vivienne Mora)  3. S/P Incision and drainage of right foot (9/4/2021 - Dr. Cristian Trivedi.)    Comorbidities  Patient Active Problem List   Diagnosis Code    Diabetic infection of right foot ([address] E11.628, L08.9    Essential hypertension I10    Vitamin D insufficiency E55.9    Impaired mobility and ADLs Z74.09, Z78.9    Type 2 diabetes mellitus without complication, with long-term current use of insulin ([address] E11.9, Z79.4    COVID-19 ruled out by laboratory testing Z20.822    Status post incision and drainage Z98.890    History of incision and drainage Z98.890    Hyperthyroidism E05.90       Plan:     1. Justification for continued stay: Good progression towards established rehabilitation goals.     2. Medical Issues being followed closely:    [x]  Fall and safety precautions     [x]  Wound Care     [x]  Bowel and Bladder Function     [x]  Fluid Electrolyte and Nutrition Balance     [x]  Pain Control      3. Issues that 24 hour rehabilitation nursing is following:    [x]  Fall and safety precautions     [x]  Wound Care     [x]  Bowel and Bladder Function     [x]  Fluid Electrolyte and Nutrition Balance     [x]  Pain Control      [x]  Assistance with and education on in-room safety with transfers to and from the bed, wheelchair, toilet and shower. 4. Acute rehabilitation plan of care:    [x]  Continue current care and rehab. [x]  Physical Therapy           [x]  Occupational Therapy           []  Speech Therapy     []  Hold Rehab until further notice     5. Medications:    [x]  MAR Reviewed     [x]  Continue Present Medications     6. DVT Prophylaxis:      []  Enoxaparin     [x]  Unfractionated Heparin     []  Warfarin     []  NOAC     []  WYATT Stockings     []  Sequential Compression Device     []  None     7. Code status    [x]  Full code     []  Partial code     []  Do not intubate     []  Do not resuscitate     8. Orders:   > Diabetic infection of right foot; S/P Incision and drainage, with decompression of multiple subfascial layers, right foot; Wound debridement, skin, soft tissue, and muscle, right foot (9/9/2021 - Dr. Cassia Veliz); S/P Incision and drainage of right foot (9/4/2021 - Dr. Misha Malloy.)   > Nonweightbearing on the right foot   > Wound care nurse consult for WoundVac management   > Wound care instructions: Staff nurse to perform wound vac dressing changes Monday, Wednesday and fridays. Remove old dressing from right plantar foot wound and clean site  with wound spray. Apply skin prep  followed barrier ring then drapes.  Cut hole over wound, apply adaptic  to wound bed then pack wound with black foam. Applied drapes  followed by diskus.  Settings 125mmhg continuous.    > On 9/28/2021, patient had completed the recommended treatment course of Cephalexin 500 mg PO q 6 hr     > Essential hypertension   > Continue:    > Lisinopril 20 mg PO once daily (9AM)    > Metoprolol tartrate 25 mg PO q 12 hr (9AM, 9PM)    > Hydralazine 25 mg PO q 8 hr PRN for SBP greater than 170 mmHg    > Hyperthyroidism   > Continue Methimazole 10 mg PO once daily    > Type 2 diabetes mellitus without complication, with long-term current use of insulin   > HbA1c (9/3/2021) = 10.8   > On 9/25/2021, increased Insulin glargine from 15 units to 18 units SC q HS   > On 9/26/2021, started Insulin lispro 2 units SC TID AC   > On 9/28/2021, decreased Insulin glargine from 18 units to 16 units SC q HS   > On 9/29/2021, decreased insulin glargine from 16 units to 14 units SC q HS   > Continue:    > Change Insulin glargine from 14 units SC q HS to 12 units once daily before breakfast    > Insulin lispro 2 units SC TID AC    > Insulin lispro sliding scale Sc TID AC only    > Vitamin D insufficiency   > Vitamin D 25-Hydroxy (9/19/2021) = 24.0   > On 9/20/2021, started Cholecalciferol 5,000 units PO once daily   > Continue Cholecalciferol 5,000 units PO once daily    > COVID-19 ruled out by laboratory testing   > SARS-CoV-2 ([address] [place] [address]) (9/8/2021):  Not detected   > Prior to admission and upon admission to the ARU, patient did not have any fever, desaturation, cough or difficulty breathing     > Analgesia   > Continue:    > Acetaminophen 650 mg PO q 4 hr PRN for pain level 4/10 or lesser    > Percocet 5/325 1-2 tabs PO q 6 hr PRN for pain level 5/10 or greater     > Diet:   > Specifications:     []  Low fat/Low cholesterol/High fiber/PATRICIA     []  Low fat/Low cholesterol/High fiber/2 gm Na     [x]  3 carb choices (45 gm/meal)     []  4 carb choices (60 gm/meal)     []  5 carb choices (75 gm/meal)     []  Other:      []  None      > Solids (consistency):    [x]  Regular     []  Easy to chew     []  Dysphagia - Soft & Bite-sized     []  Dysphagia - Minced & moist     []  Dysphagia - Pureed     []  Full liquid     []  Clear liquid      > Liquids (consistency):    []  Thin     []  Mildly thick (Nectar thick)     []  Moderately thick (Honey thick)     []  Extremely thick (Pudding thick)      > Fluid restriction: None       9. Personal Protective Equipment (CE88 face mask) was used while interacting with the patient. Patient was using a surgical mask. 10. Patient's progress in rehabilitation and medical issues discussed with the patient. All questions answered to the best of my ability. Care plan discussed with patient and nurse. 11. Total clinical care time is 30 minutes, including review of chart including all labs, radiology, past medical history, and discussion with patient. Greater than 50% of my time was spent in coordination of care and counseling.       Signed:    Attila Villela MD    October 1, 2021

## 2021-10-01 NOTE — ROUTINE PROCESS
SHIFT CHANGE NOTE FOR [facility]    Bedside and Verbal shift change report given to TAMEKA Gan (oncoming nurse) by Melvin Paula (offgoing nurse). Report included the following information SBAR, Kardex, MAR and Recent Results. Situation:   Code Status: Full Code   Hospital Day: 13   Problem List:   Hospital Problems  Date Reviewed: 10/1/2021        Codes Class Noted POA    Essential hypertension (Chronic) ICD-10-CM: I10  ICD-9-CM: 401.9  Unknown Yes        Type 2 diabetes mellitus without complication, with long-term current use of insulin (HCC) (Chronic) ICD-10-CM: E11.9, Z79.4  ICD-9-CM: 250.00, V58.67  Unknown Yes    Overview Signed 9/20/2021 11:13 PM by Rachna Johnson MD     HbA1c (9/3/2021) = 10.8             Hyperthyroidism (Chronic) ICD-10-CM: E05.90  ICD-9-CM: 242.90  Unknown Yes        Vitamin D insufficiency (Chronic) ICD-10-CM: E55.9  ICD-9-CM: 268.9  9/19/2021 Yes    Overview Signed 9/20/2021 11:11 PM by Rachna Johnson MD     Vitamin D 25-Hydroxy (9/19/2021) = 24.0             Status post incision and drainage ICD-10-CM: Z98.890  ICD-9-CM: V45.89  9/9/2021 Yes    Overview Signed 9/20/2021 11:18 PM by Rachna Johnson MD     S/P Incision and drainage, with decompression of multiple subfascial layers, right foot; Wound debridement, skin, soft tissue, and muscle, right foot (9/9/2021 - Dr. Javon Granados)             XBUBS-70 ruled out by laboratory testing ICD-10-CM: Z20.822  ICD-9-CM: V01.79  9/8/2021 Yes    Overview Signed 9/20/2021 11:14 PM by Rachna Johnson MD     SARS-CoV-2 ([address] [place] [address]) (9/8/2021):  Not detected             History of incision and drainage ICD-10-CM: Z98.890  ICD-9-CM: V45.89  9/4/2021 Yes    Overview Signed 9/20/2021 11:18 PM by Rachna Johnson MD     S/P Incision and drainage of right foot (9/4/2021 - Dr. Cynthia Gama.)             * (Principal) Diabetic infection of right foot [facility]) ICD-10-CM: E11.628, L08.9  ICD-9-CM: 250.80, 686.9  9/2/2021 Yes        Impaired mobility and ADLs ICD-10-CM: Z74.09, Z78.9  ICD-9-CM: V49.89  9/2/2021 Yes              Background:   Past Medical History:   Past Medical History:   Diagnosis Date    COVID-19 ruled out by laboratory testing 9/8/2021    SARS-CoV-2 ([place]) (9/8/2021):  Not detected    Diabetic infection of right foot ([address] 9/2/2021    Essential hypertension     Hyperthyroidism     Type 2 diabetes mellitus without complication, with long-term current use of insulin ([facility])     HbA1c (9/3/2021) = 10.8    Vitamin D insufficiency 9/19/2021    Vitamin D 25-Hydroxy (9/19/2021) = 24.0        Assessment:   Changes in Assessment throughout shift: No change to previous assessment    Patient has a central line: no   Patient has Bal Cath: no      Last Vitals:     Vitals:    09/30/21 1557 09/30/21 2113 10/01/21 0756 10/01/21 1707   BP: 115/66 133/76 133/74 (!) 146/85   Pulse: 78 87 86 80   Resp: 15 18 16 18   Temp: 98.4 °F (36.9 °C) 98.8 °F (37.1 °C) 98.9 °F (37.2 °C) 98.7 °F (37.1 °C)   SpO2: 100% 100% 100% 96%   Weight:       Height:            PAIN    Pain Assessment    Pain Intensity 1: 0 (10/01/21 1636) Pain Intensity 1: 2 (12/29/14 1105)    Pain Location 1: Foot Pain Location 1: Abdomen    Pain Intervention(s) 1: Medication (see MAR) (premedicated for dressing change) Pain Intervention(s) 1: Medication (see MAR)  Patient Stated Pain Goal: 0 Patient Stated Pain Goal: 0  o Intervention effective: yes  o Other actions taken for pain:       Skin Assessment  Skin color Skin Color: Appropriate for ethnicity  Condition/Temperature Skin Condition/Temp: Dry, Warm  Integrity Skin Integrity: Incision (comment)  Turgor Turgor: Non-tenting  Weekly Pressure Ulcer Documentation  Pressure  Injury Documentation: No Pressure Injury Noted-Pressure Ulcer Prevention Initiated  Wound Prevention & Protection Methods  Orientation of wound Orientation of Wound Prevention: Posterior  Location of Prevention Location of Wound Prevention: Buttocks, Sacrum/Coccyx  Dressing Present Dressing Present : No  Dressing Status    Wound Offloading Wound Offloading (Prevention Methods): Bed, pressure redistribution/air, Pillows, Repositioning     INTAKE/OUPUT  Date 09/30/21 0700 - 10/01/21 0659 10/01/21 0700 - 10/02/21 0659   Shift 0700-1859 0785-8157 24 Hour Total 5957-7232 5527-0424 24 Hour Total   INTAKE   P.O. 1320  1320 360  360     P. O. 1320  1320 360  360   Shift Total(mL/kg) 1320(13.8)  1320(13.8) 360(3.8)  360(3.8)   OUTPUT   Urine(mL/kg/hr) 500(0.4)  500(0.2)        Urine Voided 500  500        Urine Occurrence(s) 3 x 2 x 5 x 3 x  3 x   Stool           Stool Occurrence(s) 0 x 0 x 0 x 0 x  0 x   Shift Total(mL/kg) 500(5.2)  500(5.2)        820 360  360   Weight (kg) 96 96 96 96 96 96       Recommendations:  1. Patient needs and requests: None at this time    2. Pending tests/procedures: routine labs     3. Functional Level/Equipment: Partial (one person) / Bed (comment); Wheelchair    Fall Precautions:   Fall risk precautions were reinforced with the patient; he was instructed to call for help prior to getting up. The following fall risk precautions were continued: bed/ chair alarms, door signage, yellow bracelet and socks as well as update of the Doroteo Sol tool in the patient's room. Willy Score: 3    HEALS Safety Check    A safety check occurred in the patient's room between off going nurse and oncoming nurse listed above.     The safety check included the below items  Area Items   H  High Alert Medications - Verify all high alert medication drips (heparin, PCA, etc.)   E  Equipment - Suction is set up for ALL patients (with yanker)  - Red plugs utilized for all equipment (IV pumps, etc.)  - WOWs wiped down at end of shift.  - Room stocked with oxygen, suction, and other unit-specific supplies   A  Alarms - Bed alarm is set for fall risk patients  - Ensure chair alarm is in place and activated if patient is up in a chair   L  Lines - Check IV for any infiltration  - Bal bag is empty if patient has a Bal   - Tubing and IV bags are labeled   S  Safety   - Room is clean, patient is clean, and equipment is clean. - Hallways are clear from equipment besides carts. - Fall bracelet on for fall risk patients  - Ensure room is clear and free of clutter  - Suction is set up for ALL patients (with yanker)  - Hallways are clear from equipment besides carts.    - Isolation precautions followed, supplies available outside room, sign posted     Leida Moss

## 2021-10-02 LAB
GLUCOSE BLD STRIP.AUTO-MCNC: 114 MG/DL (ref 70–110)
GLUCOSE BLD STRIP.AUTO-MCNC: 205 MG/DL (ref 70–110)
GLUCOSE BLD STRIP.AUTO-MCNC: 291 MG/DL (ref 70–110)
GLUCOSE BLD STRIP.AUTO-MCNC: 294 MG/DL (ref 70–110)
GLUCOSE BLD STRIP.AUTO-MCNC: 346 MG/DL (ref 70–110)
GLUCOSE BLD STRIP.AUTO-MCNC: 58 MG/DL (ref 70–110)
GLUCOSE BLD STRIP.AUTO-MCNC: 59 MG/DL (ref 70–110)
GLUCOSE BLD STRIP.AUTO-MCNC: 66 MG/DL (ref 70–110)
GLUCOSE BLD STRIP.AUTO-MCNC: 73 MG/DL (ref 70–110)

## 2021-10-02 PROCEDURE — 65310000000 HC RM PRIVATE REHAB

## 2021-10-02 PROCEDURE — 74011636637 HC RX REV CODE- 636/637: Performed by: EMERGENCY MEDICINE

## 2021-10-02 PROCEDURE — 74011636637 HC RX REV CODE- 636/637: Performed by: INTERNAL MEDICINE

## 2021-10-02 PROCEDURE — 82962 GLUCOSE BLOOD TEST: CPT

## 2021-10-02 PROCEDURE — 74011250637 HC RX REV CODE- 250/637: Performed by: EMERGENCY MEDICINE

## 2021-10-02 PROCEDURE — 74011250637 HC RX REV CODE- 250/637: Performed by: FAMILY MEDICINE

## 2021-10-02 PROCEDURE — 97530 THERAPEUTIC ACTIVITIES: CPT

## 2021-10-02 PROCEDURE — 74011250636 HC RX REV CODE- 250/636: Performed by: INTERNAL MEDICINE

## 2021-10-02 PROCEDURE — 97535 SELF CARE MNGMENT TRAINING: CPT

## 2021-10-02 PROCEDURE — 74011250637 HC RX REV CODE- 250/637: Performed by: INTERNAL MEDICINE

## 2021-10-02 PROCEDURE — 97116 GAIT TRAINING THERAPY: CPT

## 2021-10-02 RX ORDER — INSULIN GLARGINE 100 [IU]/ML
15 INJECTION, SOLUTION SUBCUTANEOUS
Status: DISCONTINUED | OUTPATIENT
Start: 2021-10-02 | End: 2021-10-02

## 2021-10-02 RX ORDER — LISINOPRIL 10 MG/1
10 TABLET ORAL ONCE
Status: COMPLETED | OUTPATIENT
Start: 2021-10-02 | End: 2021-10-02

## 2021-10-02 RX ORDER — INSULIN LISPRO 100 [IU]/ML
4 INJECTION, SOLUTION INTRAVENOUS; SUBCUTANEOUS
Status: DISCONTINUED | OUTPATIENT
Start: 2021-10-02 | End: 2021-10-03

## 2021-10-02 RX ORDER — INSULIN GLARGINE 100 [IU]/ML
18 INJECTION, SOLUTION SUBCUTANEOUS
Status: DISCONTINUED | OUTPATIENT
Start: 2021-10-03 | End: 2021-10-03

## 2021-10-02 RX ADMIN — INSULIN LISPRO 3 UNITS: 100 INJECTION, SOLUTION INTRAVENOUS; SUBCUTANEOUS at 17:46

## 2021-10-02 RX ADMIN — METHIMAZOLE 10 MG: 10 TABLET ORAL at 08:21

## 2021-10-02 RX ADMIN — INSULIN LISPRO 2 UNITS: 100 INJECTION, SOLUTION INTRAVENOUS; SUBCUTANEOUS at 08:24

## 2021-10-02 RX ADMIN — DOCUSATE SODIUM 100 MG: 100 CAPSULE, LIQUID FILLED ORAL at 08:21

## 2021-10-02 RX ADMIN — METOPROLOL TARTRATE 50 MG: 50 TABLET, FILM COATED ORAL at 08:21

## 2021-10-02 RX ADMIN — HEPARIN SODIUM 5000 UNITS: 5000 INJECTION INTRAVENOUS; SUBCUTANEOUS at 17:33

## 2021-10-02 RX ADMIN — FAMOTIDINE 20 MG: 20 TABLET, FILM COATED ORAL at 08:21

## 2021-10-02 RX ADMIN — LISINOPRIL 20 MG: 20 TABLET ORAL at 08:21

## 2021-10-02 RX ADMIN — HEPARIN SODIUM 5000 UNITS: 5000 INJECTION INTRAVENOUS; SUBCUTANEOUS at 02:00

## 2021-10-02 RX ADMIN — LISINOPRIL 10 MG: 10 TABLET ORAL at 15:10

## 2021-10-02 RX ADMIN — INSULIN LISPRO 4 UNITS: 100 INJECTION, SOLUTION INTRAVENOUS; SUBCUTANEOUS at 12:47

## 2021-10-02 RX ADMIN — INSULIN GLARGINE 15 UNITS: 100 INJECTION, SOLUTION SUBCUTANEOUS at 08:24

## 2021-10-02 RX ADMIN — INSULIN LISPRO 6 UNITS: 100 INJECTION, SOLUTION INTRAVENOUS; SUBCUTANEOUS at 08:24

## 2021-10-02 RX ADMIN — Medication 5000 UNITS: at 08:21

## 2021-10-02 RX ADMIN — INSULIN LISPRO 2 UNITS: 100 INJECTION, SOLUTION INTRAVENOUS; SUBCUTANEOUS at 12:47

## 2021-10-02 RX ADMIN — POLYETHYLENE GLYCOL 3350 17 G: 17 POWDER, FOR SOLUTION ORAL at 08:21

## 2021-10-02 RX ADMIN — FAMOTIDINE 20 MG: 20 TABLET, FILM COATED ORAL at 17:33

## 2021-10-02 RX ADMIN — DOCUSATE SODIUM 100 MG: 100 CAPSULE, LIQUID FILLED ORAL at 17:33

## 2021-10-02 RX ADMIN — METOPROLOL TARTRATE 50 MG: 50 TABLET, FILM COATED ORAL at 21:03

## 2021-10-02 RX ADMIN — HEPARIN SODIUM 5000 UNITS: 5000 INJECTION INTRAVENOUS; SUBCUTANEOUS at 08:20

## 2021-10-02 NOTE — PROGRESS NOTES
Problem: Pressure Injury - Risk of  Goal: *Prevention of pressure injury  Description: Document Sarabjit Scale and appropriate interventions in the flowsheet. Outcome: Progressing Towards Goal  Note: Pressure Injury Interventions:  Sensory Interventions: Assess changes in LOC, Assess need for specialty bed, Chair cushion, Check visual cues for pain, Float heels, Keep linens dry and wrinkle-free, Maintain/enhance activity level, Minimize linen layers, Pressure redistribution bed/mattress (bed type)         Activity Interventions: Assess need for specialty bed, Chair cushion, Increase time out of bed, Pressure redistribution bed/mattress(bed type)    Mobility Interventions: Assess need for specialty bed, Chair cushion, Float heels, HOB 30 degrees or less, Pressure redistribution bed/mattress (bed type)    Nutrition Interventions: Document food/fluid/supplement intake    Friction and Shear Interventions: Apply protective barrier, creams and emollients, Feet elevated on foot rest, HOB 30 degrees or less, Minimize layers                Problem: Patient Education: Go to Patient Education Activity  Goal: Patient/Family Education  Outcome: Progressing Towards Goal     Problem: Falls - Risk of  Goal: *Absence of Falls  Description: Document Willy Fall Risk and appropriate interventions in the flowsheet.   Outcome: Progressing Towards Goal  Note: Fall Risk Interventions:  Mobility Interventions: Assess mobility with egress test, Bed/chair exit alarm, Patient to call before getting OOB, Utilize walker, cane, or other assistive device, Utilize gait belt for transfers/ambulation    Mentation Interventions: Adequate sleep, hydration, pain control, Bed/chair exit alarm, Family/sitter at bedside, Familiar objects from home, Eyeglasses and hearing aids, Gait belt with transfers/ambulation, Increase mobility, Self-releasing belt, Toileting rounds, Update white board    Medication Interventions: Assess postural VS orthostatic hypotension, Bed/chair exit alarm, Patient to call before getting OOB, Teach patient to arise slowly, Utilize gait belt for transfers/ambulation    Elimination Interventions: Bed/chair exit alarm, Call light in reach, Elevated toilet seat, Patient to call for help with toileting needs, Stay With Me (per policy), Toilet paper/wipes in reach, Toileting schedule/hourly rounds, Urinal in reach              Problem: Patient Education: Go to Patient Education Activity  Goal: Patient/Family Education  Outcome: Progressing Towards Goal     Problem: Patient Education: Go to Patient Education Activity  Goal: Patient/Family Education  Outcome: Progressing Towards Goal     Problem: Patient Education: Go to Patient Education Activity  Goal: Patient/Family Education  Outcome: Progressing Towards Goal     Problem: Diabetes Self-Management  Goal: *Disease process and treatment process  Description: Define diabetes and identify own type of diabetes; list 3 options for treating diabetes. Outcome: Progressing Towards Goal  Goal: *Incorporating nutritional management into lifestyle  Description: Describe effect of type, amount and timing of food on blood glucose; list 3 methods for planning meals. Outcome: Progressing Towards Goal  Goal: *Incorporating physical activity into lifestyle  Description: State effect of exercise on blood glucose levels. Outcome: Progressing Towards Goal  Goal: *Developing strategies to promote health/change behavior  Description: Define the ABC's of diabetes; identify appropriate screenings, schedule and personal plan for screenings. Outcome: Progressing Towards Goal  Goal: *Using medications safely  Description: State effect of diabetes medications on diabetes; name diabetes medication taking, action and side effects.   Outcome: Progressing Towards Goal  Goal: *Monitoring blood glucose, interpreting and using results  Description: Identify recommended blood glucose targets  and personal targets. Outcome: Progressing Towards Goal  Goal: *Prevention, detection, treatment of acute complications  Description: List symptoms of hyper- and hypoglycemia; describe how to treat low blood sugar and actions for lowering  high blood glucose level. Outcome: Progressing Towards Goal  Goal: *Prevention, detection and treatment of chronic complications  Description: Define the natural course of diabetes and describe the relationship of blood glucose levels to long term complications of diabetes.   Outcome: Progressing Towards Goal  Goal: *Developing strategies to address psychosocial issues  Description: Describe feelings about living with diabetes; identify support needed and support network  Outcome: Progressing Towards Goal     Problem: Patient Education: Go to Patient Education Activity  Goal: Patient/Family Education  Outcome: Progressing Towards Goal

## 2021-10-02 NOTE — PROGRESS NOTES
Problem: Pressure Injury - Risk of  Goal: *Prevention of pressure injury  Description: Document Sarabjit Scale and appropriate interventions in the flowsheet. Outcome: Progressing Towards Goal  Note: Pressure Injury Interventions:  Sensory Interventions: Assess changes in LOC, Assess need for specialty bed, Chair cushion, Check visual cues for pain, Float heels, Keep linens dry and wrinkle-free, Maintain/enhance activity level, Minimize linen layers, Pressure redistribution bed/mattress (bed type)         Activity Interventions: Assess need for specialty bed, Chair cushion, Increase time out of bed, Pressure redistribution bed/mattress(bed type)    Mobility Interventions: Assess need for specialty bed, Chair cushion, Float heels, HOB 30 degrees or less, Pressure redistribution bed/mattress (bed type)    Nutrition Interventions: Document food/fluid/supplement intake    Friction and Shear Interventions: Apply protective barrier, creams and emollients, Feet elevated on foot rest, HOB 30 degrees or less, Minimize layers                Problem: Patient Education: Go to Patient Education Activity  Goal: Patient/Family Education  Outcome: Progressing Towards Goal     Problem: Falls - Risk of  Goal: *Absence of Falls  Description: Document Willy Fall Risk and appropriate interventions in the flowsheet.   Outcome: Progressing Towards Goal  Note: Fall Risk Interventions:  Mobility Interventions: Assess mobility with egress test, Bed/chair exit alarm, Patient to call before getting OOB, Utilize walker, cane, or other assistive device, Utilize gait belt for transfers/ambulation    Mentation Interventions: Adequate sleep, hydration, pain control, Bed/chair exit alarm, Family/sitter at bedside, Familiar objects from home, Eyeglasses and hearing aids, Gait belt with transfers/ambulation, Increase mobility, Self-releasing belt, Toileting rounds, Update white board    Medication Interventions: Assess postural VS orthostatic hypotension, Bed/chair exit alarm, Patient to call before getting OOB, Teach patient to arise slowly, Utilize gait belt for transfers/ambulation    Elimination Interventions: Bed/chair exit alarm, Call light in reach, Elevated toilet seat, Patient to call for help with toileting needs, Stay With Me (per policy), Toilet paper/wipes in reach, Toileting schedule/hourly rounds, Urinal in reach              Problem: Patient Education: Go to Patient Education Activity  Goal: Patient/Family Education  Outcome: Progressing Towards Goal     Problem: Patient Education: Go to Patient Education Activity  Goal: Patient/Family Education  Outcome: Progressing Towards Goal     Problem: Patient Education: Go to Patient Education Activity  Goal: Patient/Family Education  Outcome: Progressing Towards Goal     Problem: Diabetes Self-Management  Goal: *Disease process and treatment process  Description: Define diabetes and identify own type of diabetes; list 3 options for treating diabetes. Outcome: Progressing Towards Goal  Goal: *Incorporating nutritional management into lifestyle  Description: Describe effect of type, amount and timing of food on blood glucose; list 3 methods for planning meals. Outcome: Progressing Towards Goal  Goal: *Incorporating physical activity into lifestyle  Description: State effect of exercise on blood glucose levels. Outcome: Progressing Towards Goal  Goal: *Developing strategies to promote health/change behavior  Description: Define the ABC's of diabetes; identify appropriate screenings, schedule and personal plan for screenings. Outcome: Progressing Towards Goal  Goal: *Using medications safely  Description: State effect of diabetes medications on diabetes; name diabetes medication taking, action and side effects.   Outcome: Progressing Towards Goal  Goal: *Monitoring blood glucose, interpreting and using results  Description: Identify recommended blood glucose targets  and personal targets. Outcome: Progressing Towards Goal  Goal: *Prevention, detection, treatment of acute complications  Description: List symptoms of hyper- and hypoglycemia; describe how to treat low blood sugar and actions for lowering  high blood glucose level. Outcome: Progressing Towards Goal  Goal: *Prevention, detection and treatment of chronic complications  Description: Define the natural course of diabetes and describe the relationship of blood glucose levels to long term complications of diabetes.   Outcome: Progressing Towards Goal  Goal: *Developing strategies to address psychosocial issues  Description: Describe feelings about living with diabetes; identify support needed and support network  Outcome: Progressing Towards Goal  Goal: *Sick day guidelines  Outcome: Progressing Towards Goal     Problem: Patient Education: Go to Patient Education Activity  Goal: Patient/Family Education  Outcome: Progressing Towards Goal

## 2021-10-02 NOTE — ROUTINE PROCESS
SHIFT CHANGE NOTE FOR [facility]    Bedside and Verbal shift change report given to Hilda Vaughn, TAMEKA (oncoming nurse) by Justa Baker RN (offgoing nurse). Report included the following information SBAR, Kardex, MAR and Recent Results. Situation:   Code Status: Full Code   Hospital Day: 14   Problem List:   Hospital Problems  Date Reviewed: 10/1/2021        Codes Class Noted POA    Essential hypertension (Chronic) ICD-10-CM: I10  ICD-9-CM: 401.9  Unknown Yes        Type 2 diabetes mellitus without complication, with long-term current use of insulin (HCC) (Chronic) ICD-10-CM: E11.9, Z79.4  ICD-9-CM: 250.00, V58.67  Unknown Yes    Overview Signed 9/20/2021 11:13 PM by Cristian Maher MD     HbA1c (9/3/2021) = 10.8             Hyperthyroidism (Chronic) ICD-10-CM: E05.90  ICD-9-CM: 242.90  Unknown Yes        Vitamin D insufficiency (Chronic) ICD-10-CM: E55.9  ICD-9-CM: 268.9  9/19/2021 Yes    Overview Signed 9/20/2021 11:11 PM by Cristian Maher MD     Vitamin D 25-Hydroxy (9/19/2021) = 24.0             Status post incision and drainage ICD-10-CM: Z98.890  ICD-9-CM: V45.89  9/9/2021 Yes    Overview Signed 9/20/2021 11:18 PM by Cristian Maher MD     S/P Incision and drainage, with decompression of multiple subfascial layers, right foot; Wound debridement, skin, soft tissue, and muscle, right foot (9/9/2021 - Dr. Zachery Leyden)             [id number] ruled out by laboratory testing ICD-10-CM: Z20.822  ICD-9-CM: V01.79  9/8/2021 Yes    Overview Signed 9/20/2021 11:14 PM by Cristian Maher MD     SARS-CoV-2 ([address] [place] [address]) (9/8/2021):  Not detected             History of incision and drainage ICD-10-CM: Z98.890  ICD-9-CM: V45.89  9/4/2021 Yes    Overview Signed 9/20/2021 11:18 PM by Cristian Maher MD     S/P Incision and drainage of right foot (9/4/2021 - Dr. Karina Tobias)             * (Principal) Diabetic infection of right foot [facility]) ICD-10-CM: E11.628, L08.9  ICD-9-CM: 250.80, 686.9  9/2/2021 Yes Impaired mobility and ADLs ICD-10-CM: Z74.09, Z78.9  ICD-9-CM: V49.89  9/2/2021 Yes              Background:   Past Medical History:   Past Medical History:   Diagnosis Date    COVID-19 ruled out by laboratory testing 9/8/2021    [facility] (9/8/2021):  Not detected    Diabetic infection of right foot ([address] 9/2/2021    Essential hypertension     Hyperthyroidism     Type 2 diabetes mellitus without complication, with long-term current use of insulin ([facility])     HbA1c (9/3/2021) = 10.8    Vitamin D insufficiency 9/19/2021    Vitamin D 25-Hydroxy (9/19/2021) = 24.0        Assessment:   Changes in Assessment throughout shift: No change to previous assessment    Patient has a central line: no   Patient has Bal Cath: no      Last Vitals:     Vitals:    09/30/21 2113 10/01/21 0756 10/01/21 1707 10/01/21 2117   BP: 133/76 133/74 (!) 146/85 (!) 143/84   Pulse: 87 86 80 82   Resp: 18 16 18 18   Temp: 98.8 °F (37.1 °C) 98.9 °F (37.2 °C) 98.7 °F (37.1 °C) 98.7 °F (37.1 °C)   SpO2: 100% 100% 96% 96%   Weight:       Height:            PAIN    Pain Assessment    Pain Intensity 1: 0 (10/02/21 0400) Pain Intensity 1: 2 (12/29/14 1105)    Pain Location 1: Foot Pain Location 1: Abdomen    Pain Intervention(s) 1: Medication (see MAR) (premedicated for dressing change) Pain Intervention(s) 1: Medication (see MAR)  Patient Stated Pain Goal: 0 Patient Stated Pain Goal: 0  o Intervention effective: yes  o Other actions taken for pain:       Skin Assessment  Skin color Skin Color: Appropriate for ethnicity  Condition/Temperature Skin Condition/Temp: Dry, Warm  Integrity Skin Integrity: Incision (comment)  Turgor Turgor: Non-tenting  Weekly Pressure Ulcer Documentation  Pressure  Injury Documentation: No Pressure Injury Noted-Pressure Ulcer Prevention Initiated  Wound Prevention & Protection Methods  Orientation of wound Orientation of Wound Prevention: Posterior  Location of Prevention Location of Wound Prevention: Buttocks, Sacrum/Coccyx  Dressing Present Dressing Present : No  Dressing Status    Wound Offloading Wound Offloading (Prevention Methods): Bed, pressure redistribution/air, Elevate heels     INTAKE/OUPUT  Date 10/01/21 0700 - 10/02/21 0659 10/02/21 0700 - 10/03/21 0659   Shift 0700-1859 1900-0659 24 Hour Total 0700-1859 1900-0659 24 Hour Total   INTAKE   P.O. 360 240 600        P. O. 360 240 600      Shift Total(mL/kg) 360(3.8) 240(2.5) 600(6.3)      OUTPUT   Urine(mL/kg/hr)           Urine Occurrence(s) 3 x 2 x 5 x      Stool           Stool Occurrence(s) 0 x 0 x 0 x      Shift Total(mL/kg)          240 600      Weight (kg) 96 96 96 96 96 96       Recommendations:  1. Patient needs and requests: None at this time    2. Pending tests/procedures: routine labs     Functional Level/Equipment: [employer]  Fall Precautions:   Fall risk precautions were reinforced with the patient; he was instructed to call for help prior to getting up. The following fall risk precautions were continued: bed/ chair alarms, door signage, yellow bracelet and socks as well as update of the [employer]re[employer] Reason tool in the patient's room. Willy Score: 3    HEALS Safety Check    A safety check occurred in the patient's room between off going nurse and oncoming nurse listed above.     The safety check included the below items  Area Items   H  High Alert Medications - Verify all high alert medication drips (heparin, PCA, etc.)   E  Equipment - Suction is set up for ALL patients (with yanker)  - Red plugs utilized for all equipment (IV pumps, etc.)  - WOWs wiped down at end of shift.  - Room stocked with oxygen, suction, and other unit-specific supplies   A  Alarms - Bed alarm is set for fall risk patients  - Ensure chair alarm is in place and activated if patient is up in a chair   L  Lines - Check IV for any infiltration  - Bal bag is empty if patient has a Bal   - Tubing and IV bags are labeled   S  Safety   - Room is clean, patient is clean, and equipment is clean. - Hallways are clear from equipment besides carts. - Fall bracelet on for fall risk patients  - Ensure room is clear and free of clutter  - Suction is set up for ALL patients (with darwinker)  - Hallways are clear from equipment besides carts.    - Isolation precautions followed, supplies available outside room, sign posted     Alfonso Celeste RN

## 2021-10-02 NOTE — PROGRESS NOTES
[facility] PHYSICAL [address], [address]     INPATIENT REHABILITATION  DAILY PROGRESS NOTE     Date: 10/2/2021    Name: Emmanuel Mcmillan Age / Sex: 60 y.o. / male   CSN: [id number] MRN: [id number]   [facility] Date: 9/18/2021 Length of Stay: 14 days     Primary Rehab Diagnosis: Impaired Mobility and ADLs secondary to:  1. Diabetic infection of right foot  2. S/P Incision and drainage, with decompression of multiple subfascial layers, right foot; Wound debridement, skin, soft tissue, and muscle, right foot (9/9/2021 - Dr. Vivienne Mora)  3. S/P Incision and drainage of right foot (9/4/2021 - Dr. Cristian Trivedi.)      Subjective:     No new issues or problems reported. Blood pressure fairly controlled. No documented fever since admission. Blood glucose fairly controlled.        Objective:     Vital Signs:  Patient Vitals for the past 24 hrs:   BP Temp Pulse Resp SpO2   10/02/21 0759 128/72 97.7 °F (36.5 °C) 82 16 100 %   10/01/21 2117 (!) 143/84 98.7 °F (37.1 °C) 82 18 96 %   10/01/21 1707 (!) 146/85 98.7 °F (37.1 °C) 80 18 96 %        Current Medications:  Current Facility-Administered Medications   Medication Dose Route Frequency    insulin glargine (LANTUS) injection 15 Units  15 Units SubCUTAneous ACB    insulin lispro (HUMALOG) injection 2 Units  2 Units SubCUTAneous TIDAC    metoprolol tartrate (LOPRESSOR) tablet 50 mg  50 mg Oral Q12H    polyethylene glycol (MIRALAX) packet 17 g  17 g Oral DAILY    insulin lispro (HUMALOG) injection   SubCUTAneous TIDAC    heparin (porcine) injection 5,000 Units  5,000 Units SubCUTAneous Q8H    lisinopriL (PRINIVIL, ZESTRIL) tablet 20 mg  20 mg Oral DAILY    methIMAzole (TAPAZOLE) tablet 10 mg  10 mg Oral DAILY    oxyCODONE-acetaminophen (PERCOCET) 5-325 mg per tablet 1-2 Tablet  1-2 Tablet Oral Q6H PRN    acetaminophen (TYLENOL) tablet 650 mg  650 mg Oral Q4H PRN    cholecalciferol (VITAMIN D3) capsule 5,000 Units 5,000 Units Oral DAILY    famotidine (PEPCID) tablet 20 mg  20 mg Oral BID    docusate sodium (COLACE) capsule 100 mg  100 mg Oral BID    magnesium hydroxide (MILK OF MAGNESIA) 400 mg/5 mL oral suspension 30 mL  30 mL Oral DAILY PRN    bisacodyL (DULCOLAX) tablet 10 mg  10 mg Oral Q48H PRN    glucose chewable tablet 16 g  4 Tablet Oral PRN    glucagon (GLUCAGEN) injection 1 mg  1 mg IntraMUSCular PRN    dextrose (D50W) injection syrg 12.5-25 g  25-50 mL IntraVENous PRN    hydrALAZINE (APRESOLINE) tablet 25 mg  25 mg Oral Q8H PRN       Allergies:  No Known Allergies      Lab/Data Review:  Recent Results (from the past 24 hour(s))   GLUCOSE, POC    Collection Time: 10/01/21  4:52 PM   Result Value Ref Range    Glucose (POC) 136 (H) 70 - 110 mg/dL   GLUCOSE, POC    Collection Time: 10/01/21  8:38 PM   Result Value Ref Range    Glucose (POC) 220 (H) 70 - 110 mg/dL   GLUCOSE, POC    Collection Time: 10/02/21  3:39 AM   Result Value Ref Range    Glucose (POC) 291 (H) 70 - 110 mg/dL   GLUCOSE, POC    Collection Time: 10/02/21  7:28 AM   Result Value Ref Range    Glucose (POC) 346 (H) 70 - 110 mg/dL   GLUCOSE, POC    Collection Time: 10/02/21 11:50 AM   Result Value Ref Range    Glucose (POC) 294 (H) 70 - 110 mg/dL       Assessment:     Primary Rehabilitation Diagnosis  1. Impaired Mobility and ADLs  2. Diabetic infection of right foot  2. S/P Incision and drainage, with decompression of multiple subfascial layers, right foot; Wound debridement, skin, soft tissue, and muscle, right foot (9/9/2021 - Dr. Kameron Turner)  3.  S/P Incision and drainage of right foot (9/4/2021 - Dr. Susanne Hampton)    Comorbidities  Patient Active Problem List   Diagnosis Code    [facility] E11.628, L08.9    Essential hypertension I10    Vitamin D insufficiency E55.9    Impaired mobility and ADLs Z74.09, Z78.9    Type 2 diabetes mellitus without complication, with long-term current use of insulin ([facility] E11.9, Z79.4    COVID-19 ruled out by laboratory testing Z20.822    Status post incision and drainage Z98.890    History of incision and drainage Z98.890    Hyperthyroidism E05.90       Plan:     1. Justification for continued stay: Good progression towards established rehabilitation goals. 2. Medical Issues being followed closely:    [x]  Fall and safety precautions     [x]  Wound Care     [x]  Bowel and Bladder Function     [x]  Fluid Electrolyte and Nutrition Balance     [x]  Pain Control      3. Issues that 24 hour rehabilitation nursing is following:    [x]  Fall and safety precautions     [x]  Wound Care     [x]  Bowel and Bladder Function     [x]  Fluid Electrolyte and Nutrition Balance     [x]  Pain Control      [x]  Assistance with and education on in-room safety with transfers to and from the bed, wheelchair, toilet and shower. 4. Acute rehabilitation plan of care:    [x]  Continue current care and rehab. [x]  Physical Therapy           [x]  Occupational Therapy           []  Speech Therapy     []  Hold Rehab until further notice     5. Medications:    [x]  MAR Reviewed     [x]  Continue Present Medications     6. DVT Prophylaxis:      []  Enoxaparin     [x]  Unfractionated Heparin     []  Warfarin     []  NOAC     []  WYATT Stockings     []  Sequential Compression Device     []  None     7. Code status    [x]  Full code     []  Partial code     []  Do not intubate     []  Do not resuscitate     8. Orders:   > Diabetic infection of right foot; S/P Incision and drainage, with decompression of multiple subfascial layers, right foot;  Wound debridement, skin, soft tissue, and muscle, right foot (9/9/2021 - Dr. Petra Caro); S/P Incision and drainage of right foot (9/4/2021 - Dr. Hernesto Angelo.)   > Nonweightbearing on the right foot   > Wound care nurse consult for WoundVac management   > Wound care instructions: Staff nurse to perform wound vac dressing changes Monday, Wednesday and fridays. Remove old dressing from right plantar foot wound and clean site  with wound spray. Apply skin prep  followed barrier ring then drapes. Cut hole over wound, apply adaptic  to wound bed then pack wound with black foam. Applied drapes  followed by diskus.  Settings 125mmhg continuous.    > On 9/28/2021, patient had completed the recommended treatment course of Cephalexin 500 mg PO q 6 hr     > Essential hypertension   > Continue:    > Increase Lisinopril from 20 mg to 30 mg PO once daily (9AM)    > Metoprolol tartrate 25 mg PO q 12 hr (9AM, 9PM)    > Hydralazine 25 mg PO q 8 hr PRN for SBP greater than 170 mmHg    > Hyperthyroidism   > Continue Methimazole 10 mg PO once daily    > Type 2 diabetes mellitus without complication, with long-term current use of insulin   > HbA1c (9/3/2021) = 10.8   > On 9/25/2021, increased Insulin glargine from 15 units to 18 units SC q HS   > On 9/26/2021, started Insulin lispro 2 units SC TID AC   > On 9/28/2021, decreased Insulin glargine from 18 units to 16 units SC q HS   > On 9/29/2021, decreased insulin glargine from 16 units to 14 units SC q HS   > On 10/1/2021, changed Insulin glargine from 14 units SC q HS to 12 units once daily before breakfast   > Continue:    > Increase Insulin glargine from 12 units to 15 units once daily before breakfast    > Increase Insulin lispro from 2 units to 4 units SC TID AC    > Insulin lispro sliding scale SC TID AC only    > Vitamin D insufficiency   > Vitamin D 25-Hydroxy (9/19/2021) = 24.0   > On 9/20/2021, started Cholecalciferol 5,000 units PO once daily   > Continue Cholecalciferol 5,000 units PO once daily    > COVID-19 ruled out by laboratory testing   > SARS-CoV-2 ([facility]) (9/8/2021):  Not detected   > Prior to admission and upon admission to the ARU, patient did not have any fever, desaturation, cough or difficulty breathing     > Analgesia   > Continue:    > Acetaminophen 650 mg PO q 4 hr PRN for pain level 4/10 or lesser    > Percocet 5/325 1-2 tabs PO q 6 hr PRN for pain level 5/10 or greater     > Diet:   > Specifications:     []  Low fat/Low cholesterol/High fiber/PATRICIA     []  Low fat/Low cholesterol/High fiber/2 gm Na     [x]  3 carb choices (45 gm/meal)     []  4 carb choices (60 gm/meal)     []  5 carb choices (75 gm/meal)     []  Other:      []  None      > Solids (consistency):    [x]  Regular     []  Easy to chew     []  Dysphagia - Soft & Bite-sized     []  Dysphagia - Minced & moist     []  Dysphagia - Pureed     []  Full liquid     []  Clear liquid      > Liquids (consistency):    []  Thin     []  Mildly thick (Nectar thick)     []  Moderately thick (Honey thick)     []  Extremely thick (Pudding thick)      > Fluid restriction: None       Signed:    Wang Lyon MD    October 2, 2021

## 2021-10-02 NOTE — PROGRESS NOTES
Problem: Mobility Impaired (Adult and Pediatric)  Goal: *Therapy Goal (Edit Goal, Insert Text)  Description: Physical Therapy Short Term Goals  Initiated 9/20/2021, re-assessed 9/27/21, and to be accomplished within 7 day(s) [10/04/21]  1. Patient will move from supine to sit and sit to supine  and roll side to side in bed with modified independence. (met 9/27)   Goal updated: pt will perform bed mobility with independence without side rails on bed  2. Patient will transfer from bed to chair and chair to bed with Stand-by assistance using the least restrictive device, NWB R LE. (met 9/27, with use of RW)   Goal updated: pt will transfer from bed to chair and chair to bed with Supervision using a RW and NWB R LE.  3.  Patient will perform sit to stand with Stand-by assistance to RW and NWB R LE. (met 9/27/21)   Goals updated: pt will perform sit to stand with supervision to RW and NWB R LE.  4.  (Goal revised) Patient will ambulate with contact guard assist for 50 feet with RW, NWB R LE and hop method. (met 9/27/21)   Goal updated: pt will ambulate 70 ft with RW and SBA on level surfaces, NWB R LE.  5.  Patient will propel w/c 150 ft with supervision on level surfaces using B UE's and L LE. (met 9/27/21)   Goal updated: pt will propel w/c 150 ft with supervision on uneven surfaces outside. Physical Therapy Long Term Goals  Initiated 9/20/2021 and to be accomplished within 21 day(s) [10/11/21]  1. Patient will transfer from bed to chair and chair to bed with modified independence using the least restrictive device. 2.  Patient will perform sit to stand with modified independence and RW. 3.  Patient will ambulate with supervision for 80 feet with RW, NWB R LE, and hop method. (to manage household distances)  4.   Patient will ascend/descend 4 stairs with one handrail(s) with minimal assistance/contact guard assist.     Outcome: Progressing Towards Goal   PHYSICAL THERAPY TREATMENT    Patient: Lenin Pink (57 y.o. male)  Date: 10/2/2021  Diagnosis: Diabetic foot infection ([facility] [E11.628, L08.9] Diabetic infection of right foot ([facility]  Precautions: Fall, NWB  Chart, physical therapy assessment, plan of care and goals were reviewed. Time GL:3825  Time Out:1035    Patient seen for: Gait training;Patient education; Therapeutic exercise;Transfer training    Pain:  Pt pain was reported as 0/10 pre-treatment. Pt pain was reported as 0/10 post-treatment. Intervention: none indicated    Patient identified with name and : yes    SUBJECTIVE:      When asked, pt stated \"the ramp is in process now. It will be ready in time. \" Pt continues to ask questions and need step by step cues for functional tasks. Pt without c/o's and agreeable to participate in PT.     OBJECTIVE DATA SUMMARY:    Objective:     TRANSFERS Daily Assessment     Transfer Type: Other  Other: stand step w/hop method w/RW  Transfer Assistance : 5 (Stand-by assistance) (vc's for management of tubing)  Sit to Stand Assistance: Supervision (close supervision)    Pt needs cues still for safe management of wound vac tubing during transfers, as well as safe technique during transfer. Pt with supervision/ CGA at times for management of R foot rest to set-up w/c for transfers properly. Pt performed w/c <> mat table transfer with RW x 3 reps each direction. GAIT Daily Assessment    Gait Description (WDL) Exceptions to WDL    Gait Abnormalities Decreased step clearance    Assistive device Walker, rolling    Ambulation assistance - level surface 5 (Supervision/setup) (close supervision)    Distance 150 Feet (ft)    Ambulation assistance- uneven surface  NT    Comments Needs vc's for management of wound vac tubing when faces new challenges; slow pace; repeats to himself the steps; needed vc's to direct pt to ambulate around w/c to get into it.          STEPS/STAIRS Daily Assessment     Steps/Stairs ambulated      Assistance Required  (see curb training)    Rail Use      Comments      Curbs/Ramps Minimum assistance (4\" curb w/RW & gait belt; x 6 reps) pt required vc's at least 75% of the time for proper technique, management of tubing, and safety. Pt had 1 episode of LOB requiring min A to correct due to pt having front wheels of RW up on curb and posterior legs on the floor and pt attempted to push down on walker for support to hop closer to curb and walker started to tip. BALANCE Daily Assessment     Sitting - Static: Good (unsupported)  Sitting - Dynamic: Good (unsupported)  Standing - Static: Fair (+, w/RW support)  Standing - Dynamic : Impaired        WHEELCHAIR MOBILITY Daily Assessment     Able to Propel (ft): 160 feet  Functional Level: 6  Wheelchair Setup Assist Required : 4 (Contact guard assistance) (with R footrest)  Wheelchair Management: Manages left brake;Manages right brake (needs assist/verbal cues for R footrest)        ASSESSMENT:  Pt tolerated session without c/o's of pain. Pt progressing steadily with therapy, but remains at a close supervision/SBA level due to ongoing safety concerns and need for cuing for safe management with wound vac tubing. Progression toward goals:  []      Improving appropriately and progressing toward goals  [x]      Improving slowly and progressing toward goals  []      Not making progress toward goals and plan of care will be adjusted      PLAN:  Patient continues to benefit from skilled intervention to address the above impairments. Continue treatment per established plan of care. Discharge Recommendations:  Home Health PT and 24 hr supervision  Further Equipment Recommendations for Discharge:  RW and w/c, 18 inch with removable arm rests, and elevating leg rests, gel cushion and anti tippers. Estimated Discharge Date: 10/8/21    Activity Tolerance:   Good pt without c/o's before, during, or after session  Please refer to the flowsheet for vital signs taken during this treatment.     After treatment:   [] Patient left in no apparent distress in bed  [x] Patient left in no apparent distress sitting up in chair  [] Patient left in no apparent distress in w/c mobilizing under own power  [] Patient left in no apparent distress dining area  [] Patient left in no apparent distress mobilizing under own power  [x] Call bell left within reach  [] Nursing notified  [] Caregiver present  [] Bed alarm activated   [x] Chair alarm activated      Rachelle Hardy, PT  10/2/2021

## 2021-10-02 NOTE — PROGRESS NOTES
Problem: Self Care Deficits Care Plan (Adult)  Goal: *Acute Goals and Plan of Care (Insert Text)  Description: Occupational Therapy Goals   Long Term Goals  Initiated 2021 and to be accomplished within 3 week(s), by 10/10/2021. 1. Pt will perform self-feeding with I.  2. Pt will perform grooming with I.  3. Pt will perform UB bathing with Mod I and use of AE PRN. 4. Pt will perform LB bathing with Mod I and use of AE PRN. 5. Pt will perform tub/shower transfer with supv and use of LRAD. 6. Pt will perform UB dressing with Mod I.  7. Pt will perform LB dressing with supv and use of AE PRN. 8. Pt will perform toileting task with Mod I.  9. Pt will perform toilet transfer with supv and use of LRAD. Short Term Goals   Initiated 2021 and to be accomplished within 7 day(s), updated 2021. 1. Pt will perform self-feeding with Mod I. ( 2021)  2. Pt will perform grooming with set-up. ( 2021)  3. Pt will perform UB bathing with SBA and use of AE PRN. ( 2021)  4. Pt will perform LB bathing with Min A and use of AE PRN. ( 2021)  5. Pt will perform tub/shower transfer with Min A and use of LRAD. 6. Pt will perform UB dressing with SBA. ( 2021)  7. Pt will perform LB dressing with Mod A and use of AE PRN. ( 2021)  8. Pt will perform toileting task with Mod A. ( 2021)  9. Pt will perform toilet transfer with CGA and use of LRAD. ( 2021)      Outcome: Progressing Towards Goal     Occupational Therapy TREATMENT    Patient: Asad Cardoso   60 y.o. Patient identified with name and : Yes    Date: 10/2/2021    First Tx Session  Time In: 800  Time Out[de-identified] 900    Second Tx Session  Time In: 1100  Time Out[de-identified] 7921    Diagnosis: Diabetic foot infection ([address] [E11.628, L08.9]   Precautions: Fall, NWB  Chart, occupational therapy assessment, plan of care, and goals were reviewed. Pain:  Pt reports 0/10 pain or discomfort prior to treatment.     Pt reports 0/10 pain or discomfort post treatment. Intervention Provided: N/A      SUBJECTIVE:   Patient stated I wash up at the sink.     OBJECTIVE DATA SUMMARY:     THERAPEUTIC EXERCISE Daily Assessment   B UE strengthening with 7# dowel, 5 sets with 20 reps each for shoulder flexion/extension, horizontal abduction/adduction, chest press, biceps curls     B forearm/wrist strengthening with blue therabar, 5 sets with 20 reps each for supination, pronation, wrist flexion/extension    B hand strengthening with black digiflex (9 lbs) for 5 sets of 20 reps for full digital flexion      GROOMING Daily Assessment    Grooming  Grooming Assistance : 5 (Supervision)  Comments: Wheelchair level at sink, washes face and brushes teeth     UPPER BODY BATHING Daily Assessment    Upper Body Bathing  Bathing Assistance, Upper: 5 (Supervision)  Position Performed: Seated in chair  Comments: Wheelchair level at sink, close S with washing UB     LOWER BODY BATHING Daily Assessment    Lower Body Bathing  Bathing Assistance, Lower : 5 (Stand-by assistance)  Position Performed: Seated in chair;Standing  Comments: Patient washes L foot and then dons sock for safety during standing at sink. Stands with good adherance to NWB precautions and holds onto countertop with one hand while he uses other hand to bathe kim area, buttocks, thigh/back of thighs. Sits back in wheelchair for washing lower legs. UPPER BODY DRESSING Daily Assessment    Upper Body Dressing   Dressing Assistance : 5 (Supervision)  Comments: Patient dons/doffs pull over shirt without difficulty or need for assistance     LOWER BODY DRESSING Daily Assessment    Lower Body Dressing   Dressing Assistance : 5 (Stand-by assistance)  Leg Crossed Method Used: Yes  Position Performed: Seated in chair;Standing  Adaptive Equipment Used:  Other(comment) (holding countertop at bathroom sink)  Comments: Crossed L leg to don/doff sock, standing with SBA however demo good balance while adhering to NWB precautions with R foot      MOBILITY/TRANSFERS Daily Assessment   Sit to stand transfers during morning ADLs at sink with good technique pushing up through wheelchair to stand and hold onto countertop with one hand during functional tasks in standing. Self propel wheelchair to/from room and gym. ASSESSMENT:  Patient motivated to participate in OT treatment. Tolerates increased reps with UB strengthening. During morning ADLs, requires SBA only for safety due to NWB status and holding onto countertop. Good adherence to NWB status throughout session. Progression toward goals:  [x]          Improving appropriately and progressing toward goals  []          Improving slowly and progressing toward goals  []          Not making progress toward goals and plan of care will be adjusted     PLAN:  Patient continues to benefit from skilled intervention to address the above impairments. Continue treatment per established plan of care. Discharge Recommendations:  Home Health and Outpatient  Further Equipment Recommendations for Discharge:  TBD     Activity Tolerance:  Good      Estimated LOS:Per original OT POC    Please refer to the flowsheet for vital signs taken during this treatment. After treatment:   [x]  Patient left in no apparent distress sitting up in chair self propelling back to room  []  Patient left in no apparent distress in bed  []  Call bell left within reach  []  Nursing notified  []  Caregiver present  []  Bed alarm activated    COMMUNICATION/EDUCATION:   [] Home safety education was provided and the patient/caregiver indicated understanding. [x] Patient/family have participated as able in goal setting and plan of care. [] Patient/family agree to work toward stated goals and plan of care. [] Patient understands intent and goals of therapy, but is neutral about his/her participation. [] Patient is unable to participate in goal setting and plan of care. Glenda Miller, OTR/L

## 2021-10-02 NOTE — PROGRESS NOTES
Patient had hypoglycemic episode this evening. Patient asymptomatic for hypoglycemia. Blood glucose 58, given 4 OZ orange juice, retested in 15 minutes, resulted 59, given another 4 oz juice, retested in 15 minutes resulted 66, given another 4 oz juice, retested in 15 minutes resulted 73, given another 4 oz juice, retested in 15 minutes resulted 114. MD contacted, instructed nurse to give 3 units of humalog instead of 4 with dinner tray.

## 2021-10-02 NOTE — REHAB NOTE
Pt called nurses station to report blood sugar 300. Asked PT how  he knew. Pt stated that he checked with is home machine. Rechecked blood sugar reading was  291. Explained to pt that 291 is  not a critical blood sugar and the importance of following the physician orders. Pt voice understanding .  Will continue to monitor

## 2021-10-03 LAB
GLUCOSE BLD STRIP.AUTO-MCNC: 100 MG/DL (ref 70–110)
GLUCOSE BLD STRIP.AUTO-MCNC: 188 MG/DL (ref 70–110)
GLUCOSE BLD STRIP.AUTO-MCNC: 311 MG/DL (ref 70–110)
GLUCOSE BLD STRIP.AUTO-MCNC: 363 MG/DL (ref 70–110)
GLUCOSE BLD STRIP.AUTO-MCNC: 54 MG/DL (ref 70–110)
GLUCOSE BLD STRIP.AUTO-MCNC: 59 MG/DL (ref 70–110)

## 2021-10-03 PROCEDURE — 74011250637 HC RX REV CODE- 250/637: Performed by: FAMILY MEDICINE

## 2021-10-03 PROCEDURE — 97530 THERAPEUTIC ACTIVITIES: CPT

## 2021-10-03 PROCEDURE — 97116 GAIT TRAINING THERAPY: CPT

## 2021-10-03 PROCEDURE — 77030025414 HC DRSG VAC ASST SMPLC KCON -B

## 2021-10-03 PROCEDURE — 82962 GLUCOSE BLOOD TEST: CPT

## 2021-10-03 PROCEDURE — 65310000000 HC RM PRIVATE REHAB

## 2021-10-03 PROCEDURE — 74011250636 HC RX REV CODE- 250/636: Performed by: INTERNAL MEDICINE

## 2021-10-03 PROCEDURE — 74011250637 HC RX REV CODE- 250/637: Performed by: INTERNAL MEDICINE

## 2021-10-03 PROCEDURE — 74011636637 HC RX REV CODE- 636/637: Performed by: INTERNAL MEDICINE

## 2021-10-03 PROCEDURE — 2709999900 HC NON-CHARGEABLE SUPPLY

## 2021-10-03 RX ORDER — INSULIN GLARGINE 100 [IU]/ML
10 INJECTION, SOLUTION SUBCUTANEOUS 2 TIMES DAILY
Status: DISCONTINUED | OUTPATIENT
Start: 2021-10-03 | End: 2021-10-07

## 2021-10-03 RX ORDER — INSULIN LISPRO 100 [IU]/ML
3 INJECTION, SOLUTION INTRAVENOUS; SUBCUTANEOUS
Status: DISCONTINUED | OUTPATIENT
Start: 2021-10-03 | End: 2021-10-04

## 2021-10-03 RX ADMIN — INSULIN LISPRO 3 UNITS: 100 INJECTION, SOLUTION INTRAVENOUS; SUBCUTANEOUS at 17:33

## 2021-10-03 RX ADMIN — Medication 5000 UNITS: at 08:45

## 2021-10-03 RX ADMIN — FAMOTIDINE 20 MG: 20 TABLET, FILM COATED ORAL at 17:32

## 2021-10-03 RX ADMIN — INSULIN LISPRO 8 UNITS: 100 INJECTION, SOLUTION INTRAVENOUS; SUBCUTANEOUS at 08:44

## 2021-10-03 RX ADMIN — INSULIN LISPRO 3 UNITS: 100 INJECTION, SOLUTION INTRAVENOUS; SUBCUTANEOUS at 08:44

## 2021-10-03 RX ADMIN — OXYCODONE HYDROCHLORIDE AND ACETAMINOPHEN 1 TABLET: 5; 325 TABLET ORAL at 23:15

## 2021-10-03 RX ADMIN — METHIMAZOLE 10 MG: 10 TABLET ORAL at 08:45

## 2021-10-03 RX ADMIN — LISINOPRIL 30 MG: 20 TABLET ORAL at 08:45

## 2021-10-03 RX ADMIN — HEPARIN SODIUM 5000 UNITS: 5000 INJECTION INTRAVENOUS; SUBCUTANEOUS at 17:32

## 2021-10-03 RX ADMIN — METOPROLOL TARTRATE 50 MG: 50 TABLET, FILM COATED ORAL at 08:45

## 2021-10-03 RX ADMIN — HEPARIN SODIUM 5000 UNITS: 5000 INJECTION INTRAVENOUS; SUBCUTANEOUS at 00:33

## 2021-10-03 RX ADMIN — INSULIN LISPRO 6 UNITS: 100 INJECTION, SOLUTION INTRAVENOUS; SUBCUTANEOUS at 12:21

## 2021-10-03 RX ADMIN — INSULIN LISPRO 3 UNITS: 100 INJECTION, SOLUTION INTRAVENOUS; SUBCUTANEOUS at 12:21

## 2021-10-03 RX ADMIN — INSULIN GLARGINE 10 UNITS: 100 INJECTION, SOLUTION SUBCUTANEOUS at 20:34

## 2021-10-03 RX ADMIN — HEPARIN SODIUM 5000 UNITS: 5000 INJECTION INTRAVENOUS; SUBCUTANEOUS at 08:45

## 2021-10-03 RX ADMIN — METOPROLOL TARTRATE 50 MG: 50 TABLET, FILM COATED ORAL at 20:39

## 2021-10-03 RX ADMIN — FAMOTIDINE 20 MG: 20 TABLET, FILM COATED ORAL at 08:45

## 2021-10-03 RX ADMIN — INSULIN GLARGINE 18 UNITS: 100 INJECTION, SOLUTION SUBCUTANEOUS at 08:44

## 2021-10-03 NOTE — PROGRESS NOTES
[facility] PHYSICAL REHABILITATION  [address] [place] [address], [address]     INPATIENT REHABILITATION  DAILY PROGRESS NOTE     Date: 10/3/2021    Name: Mary Garcia Age / Sex: 60 y.o. / male   CSN: [id number] MRN: [id number]   [facility] Date: 9/18/2021 Length of Stay: 15 days     Primary Rehab Diagnosis: Impaired Mobility and ADLs secondary to:  1. Diabetic infection of right foot  2. S/P Incision and drainage, with decompression of multiple subfascial layers, right foot; Wound debridement, skin, soft tissue, and muscle, right foot (9/9/2021 - Dr. Carly Parkinson)  3. S/P Incision and drainage of right foot (9/4/2021 - Dr. Janusz Morrissey.)      Subjective:     No new issues or problems reported. Blood pressure fairly controlled. No documented fever since admission.    Blood glucose fairly controlled but remains elevated before breakfast.       Objective:     Vital Signs:  Patient Vitals for the past 24 hrs:   BP Temp Pulse Resp SpO2   10/03/21 0800 (!) 141/80 97.5 °F (36.4 °C) 86 16 100 %   10/02/21 2100 132/72 97.9 °F (36.6 °C) 81 18 100 %   10/02/21 1508 139/79 97.9 °F (36.6 °C) 77 16 100 %        Current Medications:  Current Facility-Administered Medications   Medication Dose Route Frequency    insulin lispro (HUMALOG) injection 3 Units  3 Units SubCUTAneous TIDAC    insulin glargine (LANTUS) injection 18 Units  18 Units SubCUTAneous ACB    lisinopriL (PRINIVIL, ZESTRIL) tablet 30 mg  30 mg Oral DAILY    metoprolol tartrate (LOPRESSOR) tablet 50 mg  50 mg Oral Q12H    polyethylene glycol (MIRALAX) packet 17 g  17 g Oral DAILY    insulin lispro (HUMALOG) injection   SubCUTAneous TIDAC    heparin (porcine) injection 5,000 Units  5,000 Units SubCUTAneous Q8H    methIMAzole (TAPAZOLE) tablet 10 mg  10 mg Oral DAILY    oxyCODONE-acetaminophen (PERCOCET) 5-325 mg per tablet 1-2 Tablet  1-2 Tablet Oral Q6H PRN    acetaminophen (TYLENOL) tablet 650 mg  650 mg Oral Q4H PRN    cholecalciferol (VITAMIN D3) capsule 5,000 Units  5,000 Units Oral DAILY    famotidine (PEPCID) tablet 20 mg  20 mg Oral BID    docusate sodium (COLACE) capsule 100 mg  100 mg Oral BID    magnesium hydroxide (MILK OF MAGNESIA) 400 mg/5 mL oral suspension 30 mL  30 mL Oral DAILY PRN    bisacodyL (DULCOLAX) tablet 10 mg  10 mg Oral Q48H PRN    glucose chewable tablet 16 g  4 Tablet Oral PRN    glucagon (GLUCAGEN) injection 1 mg  1 mg IntraMUSCular PRN    dextrose (D50W) injection syrg 12.5-25 g  25-50 mL IntraVENous PRN    hydrALAZINE (APRESOLINE) tablet 25 mg  25 mg Oral Q8H PRN       Allergies:  No Known Allergies      Lab/Data Review:  Recent Results (from the past 24 hour(s))   GLUCOSE, POC    Collection Time: 10/02/21 11:50 AM   Result Value Ref Range    Glucose (POC) 294 (H) 70 - 110 mg/dL   GLUCOSE, POC    Collection Time: 10/02/21  4:31 PM   Result Value Ref Range    Glucose (POC) 58 (L) 70 - 110 mg/dL   GLUCOSE, POC    Collection Time: 10/02/21  4:46 PM   Result Value Ref Range    Glucose (POC) 59 (L) 70 - 110 mg/dL   GLUCOSE, POC    Collection Time: 10/02/21  5:01 PM   Result Value Ref Range    Glucose (POC) 66 (L) 70 - 110 mg/dL   GLUCOSE, POC    Collection Time: 10/02/21  5:17 PM   Result Value Ref Range    Glucose (POC) 73 70 - 110 mg/dL   GLUCOSE, POC    Collection Time: 10/02/21  5:32 PM   Result Value Ref Range    Glucose (POC) 114 (H) 70 - 110 mg/dL   GLUCOSE, POC    Collection Time: 10/02/21  9:05 PM   Result Value Ref Range    Glucose (POC) 205 (H) 70 - 110 mg/dL   GLUCOSE, POC    Collection Time: 10/03/21  7:46 AM   Result Value Ref Range    Glucose (POC) 363 (H) 70 - 110 mg/dL       Assessment:     Primary Rehabilitation Diagnosis  1. Impaired Mobility and ADLs  2. Diabetic infection of right foot  2. S/P Incision and drainage, with decompression of multiple subfascial layers, right foot; Wound debridement, skin, soft tissue, and muscle, right foot (9/9/2021 - Dr. Cassia Veliz)  3.  S/P Incision and drainage of right foot (9/4/2021 - Dr. Ivon Aguilar.)    Comorbidities  Patient Active Problem List   Diagnosis Code    Diabetic infection of right foot ([facility] E11.628, L08.9    Essential hypertension I10    Vitamin D insufficiency E55.9    Impaired mobility and ADLs Z74.09, Z78.9    Type 2 diabetes mellitus without complication, with long-term current use of insulin ([facility] E11.9, Z79.4    COVID-19 ruled out by laboratory testing Z20.822    Status post incision and drainage Z98.890    History of incision and drainage Z98.890    Hyperthyroidism E05.90       Plan:     1. Justification for continued stay: Good progression towards established rehabilitation goals. 2. Medical Issues being followed closely:    [x]  Fall and safety precautions     [x]  Wound Care     [x]  Bowel and Bladder Function     [x]  Fluid Electrolyte and Nutrition Balance     [x]  Pain Control      3. Issues that 24 hour rehabilitation nursing is following:    [x]  Fall and safety precautions     [x]  Wound Care     [x]  Bowel and Bladder Function     [x]  Fluid Electrolyte and Nutrition Balance     [x]  Pain Control      [x]  Assistance with and education on in-room safety with transfers to and from the bed, wheelchair, toilet and shower. 4. Acute rehabilitation plan of care:    [x]  Continue current care and rehab. [x]  Physical Therapy           [x]  Occupational Therapy           []  Speech Therapy     []  Hold Rehab until further notice     5. Medications:    [x]  MAR Reviewed     [x]  Continue Present Medications     6. DVT Prophylaxis:      []  Enoxaparin     [x]  Unfractionated Heparin     []  Warfarin     []  NOAC     []  WYATT Stockings     []  Sequential Compression Device     []  None     7. Code status    [x]  Full code     []  Partial code     []  Do not intubate     []  Do not resuscitate     8.  Orders:   > Diabetic infection of right foot; S/P Incision and drainage, with decompression of multiple subfascial layers, right foot; Wound debridement, skin, soft tissue, and muscle, right foot (9/9/2021 - Dr. Praneeth Piper); S/P Incision and drainage of right foot (9/4/2021 - Dr. Mirian Hannon.)   > Nonweightbearing on the right foot   > Wound care nurse consult for WoundVac management   > Wound care instructions: Staff nurse to perform wound vac dressing changes Monday, Wednesday and fridays. Remove old dressing from right plantar foot wound and clean site  with wound spray. Apply skin prep  followed barrier ring then drapes.  Cut hole over wound, apply adaptic  to wound bed then pack wound with black foam. Applied drapes  followed by diskus.  Settings 125mmhg continuous.    > On 9/28/2021, patient had completed the recommended treatment course of Cephalexin 500 mg PO q 6 hr     > Essential hypertension   > On 10/2/2021, increased Lisinopril from 20 mg to 30 mg PO once daily (9AM)   > Continue:    > Lisinopril 30 mg PO once daily (9AM)    > Metoprolol tartrate 25 mg PO q 12 hr (9AM, 9PM)    > Hydralazine 25 mg PO q 8 hr PRN for SBP greater than 170 mmHg    > Hyperthyroidism   > Continue Methimazole 10 mg PO once daily    > Type 2 diabetes mellitus without complication, with long-term current use of insulin   > HbA1c (9/3/2021) = 10.8   > On 9/25/2021, increased Insulin glargine from 15 units to 18 units SC q HS   > On 9/26/2021, started Insulin lispro 2 units SC TID AC   > On 9/28/2021, decreased Insulin glargine from 18 units to 16 units SC q HS   > On 9/29/2021, decreased insulin glargine from 16 units to 14 units SC q HS   > On 10/1/2021, changed Insulin glargine from 14 units SC q HS to 12 units once daily before breakfast   > On 10/2/2021:    > Increased Insulin glargine from 12 units to 15 units once daily before breakfast    > Increased Insulin lispro from 2 units to 4 units SC TID AC   > Continue:    > Increase Insulin glargine from 15 units to 18 units once daily before breakfast --> Change to 10 units SC BID (8AM, 8PM)    > Decrease Insulin lispro from 4 units to 3 units SC TID AC    > Insulin lispro sliding scale SC TID AC only    > Vitamin D insufficiency   > Vitamin D 25-Hydroxy (9/19/2021) = 24.0   > On 9/20/2021, started Cholecalciferol 5,000 units PO once daily   > Continue Cholecalciferol 5,000 units PO once daily    > COVID-19 ruled out by laboratory testing   > SARS-CoV-2 ([facility]) (9/8/2021):  Not detected   > Prior to admission and upon admission to the ARU, patient did not have any fever, desaturation, cough or difficulty breathing     > Analgesia   > Continue:    > Acetaminophen 650 mg PO q 4 hr PRN for pain level 4/10 or lesser    > Percocet 5/325 1-2 tabs PO q 6 hr PRN for pain level 5/10 or greater     > Diet:   > Specifications:     []  Low fat/Low cholesterol/High fiber/PATRICIA     []  Low fat/Low cholesterol/High fiber/2 gm Na     [x]  3 carb choices (45 gm/meal)     []  4 carb choices (60 gm/meal)     []  5 carb choices (75 gm/meal)     []  Other:      []  None      > Solids (consistency):    [x]  Regular     []  Easy to chew     []  Dysphagia - Soft & Bite-sized     []  Dysphagia - Minced & moist     []  Dysphagia - Pureed     []  Full liquid     []  Clear liquid      > Liquids (consistency):    [x]  Thin     []  Mildly thick (Nectar thick)     []  Moderately thick (Honey thick)     []  Extremely thick (Pudding thick)      > Fluid restriction: None       Signed:    Nedra Monson MD    October 3, 2021

## 2021-10-03 NOTE — ROUTINE PROCESS
SHIFT CHANGE NOTE FOR [facility]    Bedside and Verbal shift change report given to Justine Hoyos, RN (oncoming nurse) by Bradley Mendoza, TAMEKA (offgoing nurse). Report included the following information SBAR, Kardex, MAR and Recent Results. Situation:   Code Status: Full Code   Hospital Day: 15   Problem List:   Hospital Problems  Date Reviewed: 10/2/2021        Codes Class Noted POA    Essential hypertension (Chronic) ICD-10-CM: I10  ICD-9-CM: 401.9  Unknown Yes        Type 2 diabetes mellitus without complication, with long-term current use of insulin (HCC) (Chronic) ICD-10-CM: E11.9, Z79.4  ICD-9-CM: 250.00, V58.67  Unknown Yes    Overview Signed 9/20/2021 11:13 PM by Julio Cesar Ambrosio MD     HbA1c (9/3/2021) = 10.8             Hyperthyroidism (Chronic) ICD-10-CM: E05.90  ICD-9-CM: 242.90  Unknown Yes        Vitamin D insufficiency (Chronic) ICD-10-CM: E55.9  ICD-9-CM: 268.9  9/19/2021 Yes    Overview Signed 9/20/2021 11:11 PM by Julio Cesar Ambrosio MD     Vitamin D 25-Hydroxy (9/19/2021) = 24.0             Status post incision and drainage ICD-10-CM: Z98.890  ICD-9-CM: V45.89  9/9/2021 Yes    Overview Signed 9/20/2021 11:18 PM by Julio Cesar Ambrosio MD     S/P Incision and drainage, with decompression of multiple subfascial layers, right foot; Wound debridement, skin, soft tissue, and muscle, right foot (9/9/2021 - Dr. Sher Galloway)             [id number]-[id number] ruled out by laboratory testing ICD-10-CM: Z20.822  ICD-9-CM: V01.79  9/8/2021 Yes    Overview Signed 9/20/2021 11:14 PM by Julio Cesar Ambrosio MD     SARS-CoV-2 ([address] [place] [address]) (9/8/2021):  Not detected             History of incision and drainage ICD-10-CM: Z98.890  ICD-9-CM: V45.89  9/4/2021 Yes    Overview Signed 9/20/2021 11:18 PM by Julio Cesar Ambrosio MD     S/P Incision and drainage of right foot (9/4/2021 - Dr. Fermin Love.)             * (Principal) Diabetic infection of right foot [facility]) ICD-10-CM: E11.628, L08.9  ICD-9-CM: 250.80, 686.9  9/2/2021 Yes Impaired mobility and ADLs ICD-10-CM: Z74.09, Z78.9  ICD-9-CM: V49.89  9/2/2021 Yes              Background:   Past Medical History:   Past Medical History:   Diagnosis Date    COVID-19 ruled out by laboratory testing 9/8/2021    SARS-CoV-2 ([facility]) (9/8/2021):  Not detected    Diabetic infection of right foot ([address] 9/2/2021    Essential hypertension     Hyperthyroidism     Type 2 diabetes mellitus without complication, with long-term current use of insulin ([facility])     HbA1c (9/3/2021) = 10.8    Vitamin D insufficiency 9/19/2021    Vitamin D 25-Hydroxy (9/19/2021) = 24.0        Assessment:   Changes in Assessment throughout shift: No change to previous assessment    Patient has a central line: no   Patient has Bal Cath: no      Last Vitals:     Vitals:    10/01/21 2117 10/02/21 0759 10/02/21 1508 10/02/21 2100   BP: (!) 143/84 128/72 139/79 132/72   Pulse: 82 82 77 81   Resp: 18 16 16 18   Temp: 98.7 °F (37.1 °C) 97.7 °F (36.5 °C) 97.9 °F (36.6 °C) 97.9 °F (36.6 °C)   SpO2: 96% 100% 100% 100%   Weight:       Height:            PAIN    Pain Assessment    Pain Intensity 1: 0 (10/03/21 0400) Pain Intensity 1: 2 (12/29/14 1105)    Pain Location 1: Foot Pain Location 1: Abdomen    Pain Intervention(s) 1: Medication (see MAR) (premedicated for dressing change) Pain Intervention(s) 1: Medication (see MAR)  Patient Stated Pain Goal: 0 Patient Stated Pain Goal: 0  o Intervention effective: yes  o Other actions taken for pain:       Skin Assessment  Skin color Skin Color: Appropriate for ethnicity  Condition/Temperature Skin Condition/Temp: Dry, Warm  Integrity Skin Integrity: Wound (add Wound LDA)  Turgor Turgor: Non-tenting  Weekly Pressure Ulcer Documentation  Pressure  Injury Documentation: No Pressure Injury Noted-Pressure Ulcer Prevention Initiated  Wound Prevention & Protection Methods  Orientation of wound Orientation of Wound Prevention: Posterior  Location of Prevention Location of Wound Prevention: Buttocks, Sacrum/Coccyx  Dressing Present Dressing Present : No  Dressing Status    Wound Offloading Wound Offloading (Prevention Methods): Bed, pressure redistribution/air     INTAKE/OUPUT  Date 10/02/21 0700 - 10/03/21 0659 10/03/21 0700 - 10/04/21 0659   Shift 4341-9613 1075-5831 24 Hour Total 3536-0589 1551-7729 24 Hour Total   INTAKE   P.O. 600  600        P. O. 600  600      Shift Total(mL/kg) 600(6.3)  600(6.3)      OUTPUT   Urine(mL/kg/hr)           Urine Occurrence(s) 2 x 2 x 4 x      Stool           Stool Occurrence(s) 1 x 0 x 1 x      Shift Total(mL/kg)           600      Weight (kg) 96 96 96 96 96 96       Recommendations:  1. Patient needs and requests: assist to the bedside commode    2. Pending tests/procedures: no labs today    3. Functional Level/Equipment: Partial (one person) /      Fall Precautions:   Fall risk precautions were reinforced with the patient; he was instructed to call for help prior to getting up. The following fall risk precautions were continued: bed/ chair alarms, door signage, yellow bracelet and socks as well as update of the [place] [employer] tool in the patient's room. Willy Score:      HEALS Safety Check    A safety check occurred in the patient's room between off going nurse and oncoming nurse listed above.     The safety check included the below items  Area Items   H  High Alert Medications - Verify all high alert medication drips (heparin, PCA, etc.)   E  Equipment - Suction is set up for ALL patients (with yanker)  - Red plugs utilized for all equipment (IV pumps, etc.)  - WOWs wiped down at end of shift.  - Room stocked with oxygen, suction, and other unit-specific supplies   A  Alarms - Bed alarm is set for fall risk patients  - Ensure chair alarm is in place and activated if patient is up in a chair   L  Lines - Check IV for any infiltration  - Bal bag is empty if patient has a Bal   - Tubing and IV bags are labeled   S  Safety   - Room is clean, patient is clean, and equipment is clean. - Hallways are clear from equipment besides carts. - Fall bracelet on for fall risk patients  - Ensure room is clear and free of clutter  - Suction is set up for ALL patients (with darwinker)  - Hallways are clear from equipment besides carts.    - Isolation precautions followed, supplies available outside room, sign posted     Casa Bourgeois RN

## 2021-10-03 NOTE — PROGRESS NOTES
Problem: Mobility Impaired (Adult and Pediatric)  Goal: *Therapy Goal (Edit Goal, Insert Text)  Description: Physical Therapy Short Term Goals  Initiated 9/20/2021, re-assessed 9/27/21, and to be accomplished within 7 day(s) [10/04/21]  1. Patient will move from supine to sit and sit to supine  and roll side to side in bed with modified independence. (met 9/27)   Goal updated: pt will perform bed mobility with independence without side rails on bed  2. Patient will transfer from bed to chair and chair to bed with Stand-by assistance using the least restrictive device, NWB R LE. (met 9/27, with use of RW)   Goal updated: pt will transfer from bed to chair and chair to bed with Supervision using a RW and NWB R LE.  3.  Patient will perform sit to stand with Stand-by assistance to RW and NWB R LE. (met 9/27/21)   Goals updated: pt will perform sit to stand with supervision to RW and NWB R LE.  4.  (Goal revised) Patient will ambulate with contact guard assist for 50 feet with RW, NWB R LE and hop method. (met 9/27/21)   Goal updated: pt will ambulate 70 ft with RW and SBA on level surfaces, NWB R LE.  5.  Patient will propel w/c 150 ft with supervision on level surfaces using B UE's and L LE. (met 9/27/21)   Goal updated: pt will propel w/c 150 ft with supervision on uneven surfaces outside. Physical Therapy Long Term Goals  Initiated 9/20/2021 and to be accomplished within 21 day(s) [10/11/21]  1. Patient will transfer from bed to chair and chair to bed with modified independence using the least restrictive device. 2.  Patient will perform sit to stand with modified independence and RW. 3.  Patient will ambulate with supervision for 80 feet with RW, NWB R LE, and hop method. (to manage household distances)  4.   Patient will ascend/descend 4 stairs with one handrail(s) with minimal assistance/contact guard assist.     Outcome: Progressing Towards Goal  PHYSICAL THERAPY TREATMENT    Patient: Jada Paulino (57 y.o. male)  Date: 10/3/2021  Diagnosis: Diabetic foot infection ([facility] [E11.628, L08.9] Diabetic infection of right foot ([facility]  Precautions: Fall, NWB  Chart, physical therapy assessment, plan of care and goals were reviewed. Time In:1130  Time Out:1230    Patient seen for: AM;Balance activities;Gait training;Patient education    Pain:  Pt pain was reported as 0 pre-treatment. Pt pain was reported as 0 post-treatment. Intervention: No intervention needed    Patient identified with name and : Yes    SUBJECTIVE:      I feel much more confident the last week. OBJECTIVE DATA SUMMARY:    Objective:       BED/MAT MOBILITY Daily Assessment     Rolling Right : 7 (Independent)  Rolling Left : 7 (Independent)  Supine to Sit : 7 (Independent)  Sit to Supine : 7 (Independent)      TRANSFERS Daily Assessment     Transfer Type: Other  Other: stsnd step with RW  Transfer Assistance : 5 (Supervision/setup)  Sit to Stand Assistance: Supervision        GAIT Daily Assessment    Gait Description (WDL) Exceptions to WDL    Gait Abnormalities Decreased step clearance    Assistive device Walker, rolling    Ambulation assistance - level surface 5 (Supervision/setup)    Distance  (166 ft, 125 ft)    Ambulation assistance- uneven surface      Comments Patient continues to make improvement with increased step length and improved foot clearance, left LE during swing phase. Patient appears to be using less upper extremity support during ambulation and has shown improved flow with ambulation and NWBing right LE. Improved control of RW is noted, especially with turns to the left and right. STEPS/STAIRS Daily Assessment     Steps/Stairs ambulated      Assistance Required  (see curb training)    Rail Use      Comments  Patient negotiated 4 inch curb with verbal cues for RW placement and with trouble shooting turns once on platform with minimal assistance x 2 trials.  Patient was given the opportunity of therapist demonstrating curb negotiation prior to activity, continues to require verbal cues, however some retention is noted during this session as therapist did not have to cue patient for proper positioning of RW on platform for ascending and descending curb. Patient is cautious to maintain [address] right LE throughout training. Curbs/Ramps  Minimal assistance         BALANCE Daily Assessment     Sitting - Static: Good (unsupported)  Sitting - Dynamic: Good (unsupported)  Standing - Static: Fair  Standing - Dynamic : Impaired        WHEELCHAIR MOBILITY Daily Assessment     Functional Level: 6        Neuro Re-Education:  Static standing at the RW with decreasing UE support on left and  2 fingers support, right side x 3 minutes; 3 trials with increased fatigue, however no LOB. Patient was able to maintain [address] right throughout activity. ASSESSMENT:  Patient is seen for deficits in strength, mobility, transfers, ambulation, safety and balance. Patient is agreeable and enters the gym a half an hour early for session, he was asked to return to his room until scheduled time of session. Patient was anxious to participate in session this AM and therapist observed that patient has notebook and he was jotting down information as therapist set up for curb negotiation. Progression toward goals:  [x]      Improving appropriately and progressing toward goals  []      Improving slowly and progressing toward goals  []      Not making progress toward goals and plan of care will be adjusted      PLAN:  Patient continues to benefit from skilled intervention to address the above impairments. Continue treatment per established plan of care. Discharge Recommendations:  Home health  Further Equipment Recommendations for Discharge:  NELIDA, [facility]      Estimated Discharge Date:10/8/2021    Activity Tolerance: Tolerates session well, improved carryover this session from past few sessions.   Please refer to the flowsheet for vital signs taken during this treatment.     After treatment:   [] Patient left in no apparent distress in bed  [x] Patient left in no apparent distress sitting up in chair (therapist retrieved patient's lunch tray and assisted with wheeling bedside table to patient)  [] Patient left in no apparent distress in w/c mobilizing under own power  [] Patient left in no apparent distress dining area  [] Patient left in no apparent distress mobilizing under own power  [x] Call bell left within reach  [x] Nursing notified  [x] Caregiver present  [x] Bed alarm activated   [] Chair alarm activated      Roseanne Mcgovern  10/3/2021

## 2021-10-03 NOTE — PROGRESS NOTES
Patient had hypoglycemic episode this evening. Patient asymptomatic for hypoglycemia. Blood glucose 54, given 4 OZ cranberry juice, retested in 15 minutes, resulted 59, given 4 OZ grape juice, retested in 15 minutes, resulted 100. Informed MD, instructed to give 3 units of humalog with dinner. Results for Carlos Sanchez (MRN [id number]) as of 10/3/2021 18:21   Ref.  Range 10/3/2021 16:44 10/3/2021 17:00 10/3/2021 17:16   GLUCOSE,FAST - POC Latest Ref Range: 70 - 110 mg/dL 54 (LL) 59 (L) 100

## 2021-10-03 NOTE — PROGRESS NOTES
Problem: Pressure Injury - Risk of  Goal: *Prevention of pressure injury  Description: Document Sarabjit Scale and appropriate interventions in the flowsheet. Outcome: Progressing Towards Goal  Note: Pressure Injury Interventions:  Sensory Interventions: Assess changes in LOC, Pressure redistribution bed/mattress (bed type)         Activity Interventions: Pressure redistribution bed/mattress(bed type)    Mobility Interventions: Pressure redistribution bed/mattress (bed type), HOB 30 degrees or less, Float heels    Nutrition Interventions: Document food/fluid/supplement intake    Friction and Shear Interventions: HOB 30 degrees or less                Problem: Patient Education: Go to Patient Education Activity  Goal: Patient/Family Education  Outcome: Progressing Towards Goal     Problem: Falls - Risk of  Goal: *Absence of Falls  Description: Document Willy Fall Risk and appropriate interventions in the flowsheet.   Outcome: Progressing Towards Goal  Note: Fall Risk Interventions:  Mobility Interventions: Bed/chair exit alarm, Patient to call before getting OOB    Mentation Interventions: Adequate sleep, hydration, pain control, Bed/chair exit alarm    Medication Interventions: Bed/chair exit alarm, Patient to call before getting OOB    Elimination Interventions: Bed/chair exit alarm, Call light in reach, Patient to call for help with toileting needs              Problem: Patient Education: Go to Patient Education Activity  Goal: Patient/Family Education  Outcome: Progressing Towards Goal     Problem: Patient Education: Go to Patient Education Activity  Goal: Patient/Family Education  Outcome: Progressing Towards Goal     Problem: Patient Education: Go to Patient Education Activity  Goal: Patient/Family Education  Outcome: Progressing Towards Goal     Problem: Diabetes Self-Management  Goal: *Disease process and treatment process  Description: Define diabetes and identify own type of diabetes; list 3 options for treating diabetes. Outcome: Progressing Towards Goal  Goal: *Incorporating nutritional management into lifestyle  Description: Describe effect of type, amount and timing of food on blood glucose; list 3 methods for planning meals. Outcome: Progressing Towards Goal  Goal: *Incorporating physical activity into lifestyle  Description: State effect of exercise on blood glucose levels. Outcome: Progressing Towards Goal  Goal: *Developing strategies to promote health/change behavior  Description: Define the ABC's of diabetes; identify appropriate screenings, schedule and personal plan for screenings. Outcome: Progressing Towards Goal  Goal: *Using medications safely  Description: State effect of diabetes medications on diabetes; name diabetes medication taking, action and side effects. Outcome: Progressing Towards Goal  Goal: *Monitoring blood glucose, interpreting and using results  Description: Identify recommended blood glucose targets  and personal targets. Outcome: Progressing Towards Goal  Goal: *Prevention, detection, treatment of acute complications  Description: List symptoms of hyper- and hypoglycemia; describe how to treat low blood sugar and actions for lowering  high blood glucose level. Outcome: Progressing Towards Goal  Goal: *Prevention, detection and treatment of chronic complications  Description: Define the natural course of diabetes and describe the relationship of blood glucose levels to long term complications of diabetes.   Outcome: Progressing Towards Goal  Goal: *Developing strategies to address psychosocial issues  Description: Describe feelings about living with diabetes; identify support needed and support network  Outcome: Progressing Towards Goal  Goal: *Sick day guidelines  Outcome: Progressing Towards Goal     Problem: Patient Education: Go to Patient Education Activity  Goal: Patient/Family Education  Outcome: Progressing Towards Goal

## 2021-10-04 LAB
ANION GAP SERPL CALC-SCNC: 9 MMOL/L (ref 3–18)
BUN SERPL-MCNC: 17 MG/DL (ref 7–18)
BUN/CREAT SERPL: 18 (ref 12–20)
CALCIUM SERPL-MCNC: 9 MG/DL (ref 8.5–10.1)
CHLORIDE SERPL-SCNC: 106 MMOL/L (ref 100–111)
CO2 SERPL-SCNC: 27 MMOL/L (ref 21–32)
CREAT SERPL-MCNC: 0.92 MG/DL (ref 0.6–1.3)
GLUCOSE BLD STRIP.AUTO-MCNC: 111 MG/DL (ref 70–110)
GLUCOSE BLD STRIP.AUTO-MCNC: 161 MG/DL (ref 70–110)
GLUCOSE BLD STRIP.AUTO-MCNC: 43 MG/DL (ref 70–110)
GLUCOSE BLD STRIP.AUTO-MCNC: 45 MG/DL (ref 70–110)
GLUCOSE BLD STRIP.AUTO-MCNC: 60 MG/DL (ref 70–110)
GLUCOSE BLD STRIP.AUTO-MCNC: 95 MG/DL (ref 70–110)
GLUCOSE BLD STRIP.AUTO-MCNC: 99 MG/DL (ref 70–110)
GLUCOSE SERPL-MCNC: 47 MG/DL (ref 74–99)
HCT VFR BLD AUTO: 35.3 % (ref 36–48)
HGB BLD-MCNC: 11.1 G/DL (ref 13–16)
PLATELET # BLD AUTO: 397 K/UL (ref 135–420)
POTASSIUM SERPL-SCNC: 3.9 MMOL/L (ref 3.5–5.5)
SODIUM SERPL-SCNC: 142 MMOL/L (ref 136–145)

## 2021-10-04 PROCEDURE — 74011250637 HC RX REV CODE- 250/637: Performed by: FAMILY MEDICINE

## 2021-10-04 PROCEDURE — 97110 THERAPEUTIC EXERCISES: CPT

## 2021-10-04 PROCEDURE — 85049 AUTOMATED PLATELET COUNT: CPT

## 2021-10-04 PROCEDURE — 74011250637 HC RX REV CODE- 250/637: Performed by: INTERNAL MEDICINE

## 2021-10-04 PROCEDURE — 65310000000 HC RM PRIVATE REHAB

## 2021-10-04 PROCEDURE — 74011250637 HC RX REV CODE- 250/637: Performed by: EMERGENCY MEDICINE

## 2021-10-04 PROCEDURE — 85018 HEMOGLOBIN: CPT

## 2021-10-04 PROCEDURE — 97116 GAIT TRAINING THERAPY: CPT

## 2021-10-04 PROCEDURE — 82962 GLUCOSE BLOOD TEST: CPT

## 2021-10-04 PROCEDURE — 97530 THERAPEUTIC ACTIVITIES: CPT

## 2021-10-04 PROCEDURE — 80048 BASIC METABOLIC PNL TOTAL CA: CPT

## 2021-10-04 PROCEDURE — 74011250636 HC RX REV CODE- 250/636: Performed by: INTERNAL MEDICINE

## 2021-10-04 PROCEDURE — 99232 SBSQ HOSP IP/OBS MODERATE 35: CPT | Performed by: INTERNAL MEDICINE

## 2021-10-04 PROCEDURE — 36415 COLL VENOUS BLD VENIPUNCTURE: CPT

## 2021-10-04 PROCEDURE — 74011636637 HC RX REV CODE- 636/637: Performed by: INTERNAL MEDICINE

## 2021-10-04 RX ORDER — INSULIN LISPRO 100 [IU]/ML
2 INJECTION, SOLUTION INTRAVENOUS; SUBCUTANEOUS
Status: DISCONTINUED | OUTPATIENT
Start: 2021-10-05 | End: 2021-10-06

## 2021-10-04 RX ADMIN — METOPROLOL TARTRATE 50 MG: 50 TABLET, FILM COATED ORAL at 20:55

## 2021-10-04 RX ADMIN — HEPARIN SODIUM 5000 UNITS: 5000 INJECTION INTRAVENOUS; SUBCUTANEOUS at 08:42

## 2021-10-04 RX ADMIN — POLYETHYLENE GLYCOL 3350 17 G: 17 POWDER, FOR SOLUTION ORAL at 09:33

## 2021-10-04 RX ADMIN — METOPROLOL TARTRATE 50 MG: 50 TABLET, FILM COATED ORAL at 09:33

## 2021-10-04 RX ADMIN — HEPARIN SODIUM 5000 UNITS: 5000 INJECTION INTRAVENOUS; SUBCUTANEOUS at 00:31

## 2021-10-04 RX ADMIN — LISINOPRIL 30 MG: 20 TABLET ORAL at 09:33

## 2021-10-04 RX ADMIN — FAMOTIDINE 20 MG: 20 TABLET, FILM COATED ORAL at 17:21

## 2021-10-04 RX ADMIN — FAMOTIDINE 20 MG: 20 TABLET, FILM COATED ORAL at 08:44

## 2021-10-04 RX ADMIN — Medication 5000 UNITS: at 08:44

## 2021-10-04 RX ADMIN — INSULIN LISPRO 3 UNITS: 100 INJECTION, SOLUTION INTRAVENOUS; SUBCUTANEOUS at 17:22

## 2021-10-04 RX ADMIN — METHIMAZOLE 10 MG: 10 TABLET ORAL at 08:44

## 2021-10-04 RX ADMIN — INSULIN GLARGINE 10 UNITS: 100 INJECTION, SOLUTION SUBCUTANEOUS at 09:34

## 2021-10-04 RX ADMIN — INSULIN LISPRO 3 UNITS: 100 INJECTION, SOLUTION INTRAVENOUS; SUBCUTANEOUS at 12:25

## 2021-10-04 RX ADMIN — INSULIN LISPRO 3 UNITS: 100 INJECTION, SOLUTION INTRAVENOUS; SUBCUTANEOUS at 09:34

## 2021-10-04 RX ADMIN — INSULIN LISPRO 1 UNITS: 100 INJECTION, SOLUTION INTRAVENOUS; SUBCUTANEOUS at 12:24

## 2021-10-04 RX ADMIN — HEPARIN SODIUM 5000 UNITS: 5000 INJECTION INTRAVENOUS; SUBCUTANEOUS at 16:43

## 2021-10-04 NOTE — ROUTINE PROCESS
SHIFT CHANGE NOTE FOR [facility]    Bedside and Verbal shift change report given to Gracy Mahoney RN (oncoming nurse) by Emanuel Simon RN (offgoing nurse). Report included the following information SBAR, Kardex, MAR and Recent Results. Situation:   Code Status: Full Code   Hospital Day: 16   Problem List:   Hospital Problems  Date Reviewed: 10/4/2021        Codes Class Noted POA    Essential hypertension (Chronic) ICD-10-CM: I10  ICD-9-CM: 401.9  Unknown Yes        Type 2 diabetes mellitus without complication, with long-term current use of insulin (HCC) (Chronic) ICD-10-CM: E11.9, Z79.4  ICD-9-CM: 250.00, V58.67  Unknown Yes    Overview Signed 9/20/2021 11:13 PM by Rachna Johnson MD     HbA1c (9/3/2021) = 10.8             Hyperthyroidism (Chronic) ICD-10-CM: E05.90  ICD-9-CM: 242.90  Unknown Yes        Vitamin D insufficiency (Chronic) ICD-10-CM: E55.9  ICD-9-CM: 268.9  9/19/2021 Yes    Overview Signed 9/20/2021 11:11 PM by Rachna Johnson MD     Vitamin D 25-Hydroxy (9/19/2021) = 24.0             Status post incision and drainage ICD-10-CM: Z98.890  ICD-9-CM: V45.89  9/9/2021 Yes    Overview Signed 9/20/2021 11:18 PM by Rachna Johnson MD     S/P Incision and drainage, with decompression of multiple subfascial layers, right foot; Wound debridement, skin, soft tissue, and muscle, right foot (9/9/2021 - Dr. Javon Granados)             PLJUH-19 ruled out by laboratory testing ICD-10-CM: Z20.822  ICD-9-CM: V01.79  9/8/2021 Yes    Overview Signed 9/20/2021 11:14 PM by Rachna Johnson MD     SARS-CoV-2 ([address] [place] [address]) (9/8/2021):  Not detected             History of incision and drainage ICD-10-CM: Z98.890  ICD-9-CM: V45.89  9/4/2021 Yes    Overview Signed 9/20/2021 11:18 PM by Rachna Johnson MD     S/P Incision and drainage of right foot (9/4/2021 - Dr. Cynthia Gama.)             * (Principal) Diabetic infection of right foot [facility]) ICD-10-CM: E11.628, L08.9  ICD-9-CM: 250.80, 686.9  9/2/2021 Yes Impaired mobility and ADLs ICD-10-CM: Z74.09, Z78.9  ICD-9-CM: V49.89  9/2/2021 Yes              Background:   Past Medical History:   Past Medical History:   Diagnosis Date    COVID-19 ruled out by laboratory testing 9/8/2021    [facility] ( [facility]) (9/8/2021):  Not detected    Diabetic infection of right foot ([address] 9/2/2021    Essential hypertension     Hyperthyroidism     Type 2 diabetes mellitus without complication, with long-term current use of insulin ([facility])     HbA1c (9/3/2021) = 10.8    Vitamin D insufficiency 9/19/2021    Vitamin D 25-Hydroxy (9/19/2021) = 24.0        Assessment:   Changes in Assessment throughout shift: No change to previous assessment    Patient has a central line: no   Patient has Bal Cath: no      Last Vitals:     Vitals:    10/03/21 1600 10/03/21 2039 10/04/21 0919 10/04/21 1608   BP: 128/65 124/69 121/75 (!) 145/74   Pulse: 78 80 85 83   Resp: 16 18 18 16   Temp: 97.4 °F (36.3 °C) 98.4 °F (36.9 °C) 98.5 °F (36.9 °C) 98.5 °F (36.9 °C)   SpO2: 100% 99% 100% 100%   Weight:       Height:            PAIN    Pain Assessment    Pain Intensity 1: 0 (10/04/21 1608) Pain Intensity 1: 2 (12/29/14 1105)    Pain Location 1: Foot Pain Location 1: Abdomen    Pain Intervention(s) 1:  (premedicated for dressing change) Pain Intervention(s) 1: Medication (see MAR)  Patient Stated Pain Goal: 0 Patient Stated Pain Goal: 0  o Intervention effective: yes  o Other actions taken for pain:       Skin Assessment  Skin color Skin Color: Appropriate for ethnicity  Condition/Temperature Skin Condition/Temp: Dry, Warm  Integrity Skin Integrity: Wound (add Wound LDA)  Turgor Turgor: Non-tenting  Weekly Pressure Ulcer Documentation  Pressure  Injury Documentation: No Pressure Injury Noted-Pressure Ulcer Prevention Initiated  Wound Prevention & Protection Methods  Orientation of wound Orientation of Wound Prevention: Posterior  Location of Prevention Location of Wound Prevention: Buttocks, Sacrum/Coccyx  Dressing Present Dressing Present : No  Dressing Status    Wound Offloading Wound Offloading (Prevention Methods): Bed, pressure redistribution/air, Pillows, Repositioning, Wheelchair     INTAKE/OUPUT  Date 10/03/21 0700 - 10/04/21 0659 10/04/21 0700 - 10/05/21 0659   Shift 0700-1859 1900-0659 24 Hour Total 0700-1859 1900-0659 24 Hour Total   INTAKE   P.O. 480  480 720  720     P. O. 480  480 720  720   Shift Total(mL/kg) 480(5)  480(5) 720(7.5)  720(7.5)   OUTPUT   Urine(mL/kg/hr)           Urine Occurrence(s) 5 x 1 x 6 x 2 x  2 x   Stool           Stool Occurrence(s) 2 x 1 x 3 x 0 x  0 x   Shift Total(mL/kg)           480 720  720   Weight (kg) 96 96 96 96 96 96       Recommendations:  1. Patient needs and requests: wound vac dressing change,toileting    2. Pending tests/procedures: routine labs     3. Functional Level/Equipment:   / Wheelchair    Fall Precautions:   Fall risk precautions were reinforced with the patient; he was instructed to call for help prior to getting up. The following fall risk precautions were continued: bed/ chair alarms, door signage, yellow bracelet and socks as well as update of the [place] tool in the patient's room. Willy Score:      HEALS Safety Check    A safety check occurred in the patient's room between off going nurse and oncoming nurse listed above.     The safety check included the below items  Area Items   H  High Alert Medications - Verify all high alert medication drips (heparin, PCA, etc.)   E  Equipment - Suction is set up for ALL patients (with yanker)  - Red plugs utilized for all equipment (IV pumps, etc.)  - WOWs wiped down at end of shift.  - Room stocked with oxygen, suction, and other unit-specific supplies   A  Alarms - Bed alarm is set for fall risk patients  - Ensure chair alarm is in place and activated if patient is up in a chair   L  Lines - Check IV for any infiltration  - Bal bag is empty if patient has a Bal   - Tubing and IV bags are labeled   S  Safety   - Room is clean, patient is clean, and equipment is clean. - Hallways are clear from equipment besides carts. - Fall bracelet on for fall risk patients  - Ensure room is clear and free of clutter  - Suction is set up for ALL patients (with roman)  - Hallways are clear from equipment besides carts.    - Isolation precautions followed, supplies available outside room, sign posted     Joe Gamboa RN

## 2021-10-04 NOTE — REHAB NOTE
Blood glucose per lab was 47 drawn at 5:40 am .accucheck done was 95 patient asymptomatic dr. Anabell Ellis informed no order made.

## 2021-10-04 NOTE — PROGRESS NOTES
[facility] PHYSICAL REHABILITATION  [address] [place] [address], [address]     INPATIENT REHABILITATION  DAILY PROGRESS NOTE     Date: 10/4/2021    Name: Mary Garcia Age / Sex: 60 y.o. / male   CSN: [id number] MRN: [id number]   [facility] Date: 9/18/2021 Length of Stay: 16 days     Primary Rehab Diagnosis: Impaired Mobility and ADLs secondary to:  1. Diabetic infection of right foot  2. S/P Incision and drainage, with decompression of multiple subfascial layers, right foot; Wound debridement, skin, soft tissue, and muscle, right foot (9/9/2021 - Dr. Carly Parkinson)  3. S/P Incision and drainage of right foot (9/4/2021 - Dr. Januzs Morrissey.)      Subjective:     Patient seen and examined. Blood pressure controlled. No documented fever since admission. Blood glucose better controlled. Patient's Complaint:   No significant medical complaints    Pain Control: stable, mild-to-moderate joint symptoms intermittently, reasonably well controlled by current meds      Objective:     Vital Signs:  Patient Vitals for the past 24 hrs:   BP Temp Pulse Resp SpO2   10/04/21 1608 (!) 145/74 98.5 °F (36.9 °C) 83 16 100 %   10/04/21 0919 121/75 98.5 °F (36.9 °C) 85 18 100 %   10/03/21 2039 124/69 98.4 °F (36.9 °C) 80 18 99 %        Physical Examination:  GENERAL SURVEY: Patient is awake, alert, oriented x 3, sitting comfortably on the chair, not in acute respiratory distress.   HEENT: pink palpebral conjunctivae, anicteric sclerae, no nasoaural discharge, moist oral mucosa  NECK: supple, no jugular venous distention, no palpable lymph nodes  CHEST/LUNGS: symmetrical chest expansion, good air entry, clear breath sounds  HEART: adynamic precordium, good S1 S2, no S3, regular rhythm, no murmurs  ABDOMEN: flat, bowel sounds appreciated, soft, non-tender  EXTREMITIES: (+) right foot/ankle wrapped in ACE wrap, pink nailbeds, no edema, full and equal pulses, no calf tenderness   NEUROLOGICAL EXAM: The patient is awake, alert and oriented x 3, able to answer questions fairly appropriately, able to follow 1 and 2 step commands. Able to tell time from the wall clock. Cranial nerves II-XII are grossly intact. No gross sensory deficit. Motor strength is 4 to 4+/5 on BUE and LLE, 4- to 4/5 on the RLE.        Current Medications:  Current Facility-Administered Medications   Medication Dose Route Frequency    insulin lispro (HUMALOG) injection 3 Units  3 Units SubCUTAneous TIDAC    insulin glargine (LANTUS) injection 10 Units  10 Units SubCUTAneous BID    lisinopriL (PRINIVIL, ZESTRIL) tablet 30 mg  30 mg Oral DAILY    metoprolol tartrate (LOPRESSOR) tablet 50 mg  50 mg Oral Q12H    polyethylene glycol (MIRALAX) packet 17 g  17 g Oral DAILY    insulin lispro (HUMALOG) injection   SubCUTAneous TIDAC    heparin (porcine) injection 5,000 Units  5,000 Units SubCUTAneous Q8H    methIMAzole (TAPAZOLE) tablet 10 mg  10 mg Oral DAILY    oxyCODONE-acetaminophen (PERCOCET) 5-325 mg per tablet 1-2 Tablet  1-2 Tablet Oral Q6H PRN    acetaminophen (TYLENOL) tablet 650 mg  650 mg Oral Q4H PRN    cholecalciferol (VITAMIN D3) capsule 5,000 Units  5,000 Units Oral DAILY    famotidine (PEPCID) tablet 20 mg  20 mg Oral BID    magnesium hydroxide (MILK OF MAGNESIA) 400 mg/5 mL oral suspension 30 mL  30 mL Oral DAILY PRN    bisacodyL (DULCOLAX) tablet 10 mg  10 mg Oral Q48H PRN    glucose chewable tablet 16 g  4 Tablet Oral PRN    glucagon (GLUCAGEN) injection 1 mg  1 mg IntraMUSCular PRN    dextrose (D50W) injection syrg 12.5-25 g  25-50 mL IntraVENous PRN    hydrALAZINE (APRESOLINE) tablet 25 mg  25 mg Oral Q8H PRN       Allergies:  No Known Allergies      Lab/Data Review:  Recent Results (from the past 24 hour(s))   GLUCOSE, POC    Collection Time: 10/03/21  4:44 PM   Result Value Ref Range    Glucose (POC) 54 (LL) 70 - 110 mg/dL   GLUCOSE, POC    Collection Time: 10/03/21  5:00 PM   Result Value Ref Range    Glucose (POC) 59 (L) 70 - 110 mg/dL   GLUCOSE, POC    Collection Time: 10/03/21  5:16 PM   Result Value Ref Range    Glucose (POC) 100 70 - 110 mg/dL   GLUCOSE, POC    Collection Time: 10/03/21  8:36 PM   Result Value Ref Range    Glucose (POC) 188 (H) 70 - 110 mg/dL   HGB & HCT    Collection Time: 10/04/21  5:40 AM   Result Value Ref Range    HGB 11.1 (L) 13.0 - 16.0 g/dL    HCT 35.3 (L) 36.0 - 48.0 %   PLATELET COUNT    Collection Time: 10/04/21  5:40 AM   Result Value Ref Range    PLATELET 121 968 - 772 K/uL   METABOLIC PANEL, BASIC    Collection Time: 10/04/21  5:40 AM   Result Value Ref Range    Sodium 142 136 - 145 mmol/L    Potassium 3.9 3.5 - 5.5 mmol/L    Chloride 106 100 - 111 mmol/L    CO2 27 21 - 32 mmol/L    Anion gap 9 3.0 - 18 mmol/L    Glucose 47 (LL) 74 - 99 mg/dL    BUN 17 7.0 - 18 MG/DL    Creatinine 0.92 0.6 - 1.3 MG/DL    BUN/Creatinine ratio 18 12 - 20      GFR est AA >60 >60 ml/min/1.73m2    GFR est non-AA >60 >60 ml/min/1.73m2    Calcium 9.0 8.5 - 10.1 MG/DL   GLUCOSE, POC    Collection Time: 10/04/21  7:56 AM   Result Value Ref Range    Glucose (POC) 95 70 - 110 mg/dL   GLUCOSE, POC    Collection Time: 10/04/21 11:33 AM   Result Value Ref Range    Glucose (POC) 161 (H) 70 - 110 mg/dL       Assessment:     Primary Rehabilitation Diagnosis  1. Impaired Mobility and ADLs  2. Diabetic infection of right foot  2. S/P Incision and drainage, with decompression of multiple subfascial layers, right foot; Wound debridement, skin, soft tissue, and muscle, right foot (9/9/2021 - Dr. Katherine Whitfield)  3.  S/P Incision and drainage of right foot (9/4/2021 - Dr. Agatha Snider.)    Comorbidities  Patient Active Problem List   Diagnosis Code    [facility] E11.628, L08.9    Essential hypertension I10    Vitamin D insufficiency E55.9    Impaired mobility and ADLs Z74.09, Z78.9    Type 2 diabetes mellitus without complication, with long-term current use of insulin ([facility] E11.9, Z79.4    COVID-19 ruled out by laboratory testing Z20.822    Status post incision and drainage Z98.890    History of incision and drainage Z98.890    Hyperthyroidism E05.90       Plan:     1. Justification for continued stay: Good progression towards established rehabilitation goals. 2. Medical Issues being followed closely:    [x]  Fall and safety precautions     [x]  Wound Care     [x]  Bowel and Bladder Function     [x]  Fluid Electrolyte and Nutrition Balance     [x]  Pain Control      3. Issues that 24 hour rehabilitation nursing is following:    [x]  Fall and safety precautions     [x]  Wound Care     [x]  Bowel and Bladder Function     [x]  Fluid Electrolyte and Nutrition Balance     [x]  Pain Control      [x]  Assistance with and education on in-room safety with transfers to and from the bed, wheelchair, toilet and shower. 4. Acute rehabilitation plan of care:    [x]  Continue current care and rehab. [x]  Physical Therapy           [x]  Occupational Therapy           []  Speech Therapy     []  Hold Rehab until further notice     5. Medications:    [x]  MAR Reviewed     [x]  Continue Present Medications     6. DVT Prophylaxis:      []  Enoxaparin     [x]  Unfractionated Heparin     []  Warfarin     []  NOAC     []  WYATT Stockings     []  Sequential Compression Device     []  None     7. Code status    [x]  Full code     []  Partial code     []  Do not intubate     []  Do not resuscitate     8. Orders:   > Diabetic infection of right foot; S/P Incision and drainage, with decompression of multiple subfascial layers, right foot; Wound debridement, skin, soft tissue, and muscle, right foot (9/9/2021 - Dr. Maricruz Khanna); S/P Incision and drainage of right foot (9/4/2021 - Dr. Juan Jose Greenwood.)   > Nonweightbearing on the right foot   > Wound care nurse consult for WoundVac management   > Wound care instructions: Staff nurse to perform wound vac dressing changes Monday, Wednesday and fridays.  Remove old dressing from right plantar foot wound and clean site  with wound spray. Apply skin prep  followed barrier ring then drapes.  Cut hole over wound, apply adaptic  to wound bed then pack wound with black foam. Applied drapes  followed by diskus.  Settings 125mmhg continuous.    > On 9/28/2021, patient had completed the recommended treatment course of Cephalexin 500 mg PO q 6 hr     > Essential hypertension   > On 10/2/2021, increased Lisinopril from 20 mg to 30 mg PO once daily (9AM)   > Continue:    > Lisinopril 30 mg PO once daily (9AM)    > Metoprolol tartrate 25 mg PO q 12 hr (9AM, 9PM)    > Hydralazine 25 mg PO q 8 hr PRN for SBP greater than 170 mmHg    > Hyperthyroidism   > Continue Methimazole 10 mg PO once daily    > Type 2 diabetes mellitus without complication, with long-term current use of insulin   > HbA1c (9/3/2021) = 10.8   > On 9/25/2021, increased Insulin glargine from 15 units to 18 units SC q HS   > On 9/26/2021, started Insulin lispro 2 units SC TID AC   > On 9/28/2021, decreased Insulin glargine from 18 units to 16 units SC q HS   > On 9/29/2021, decreased insulin glargine from 16 units to 14 units SC q HS   > On 10/1/2021, changed Insulin glargine from 14 units SC q HS to 12 units once daily before breakfast   > On 10/2/2021:    > Increased Insulin glargine from 12 units to 15 units once daily before breakfast    > Increased Insulin lispro from 2 units to 4 units SC TID AC   > On 10/3/2021:    > Increased Insulin glargine from 15 units to 18 units once daily before breakfast --> Changed to 10 units SC BID (8AM, 8PM)    > Decrease Insulin lispro from 4 units to 3 units SC TID AC   > Continue:    > Insulin glargine 10 units SC BID (8AM, 8PM)    > Insulin lispro 3 units SC TID AC    > Insulin lispro sliding scale SC TID AC only    > Vitamin D insufficiency   > Vitamin D 25-Hydroxy (9/19/2021) = 24.0   > On 9/20/2021, started Cholecalciferol 5,000 units PO once daily   > Continue Cholecalciferol 5,000 units PO once daily    > COVID-19 ruled out by laboratory testing   > SARS-CoV-2 ([facility]) (9/8/2021): Not detected   > Prior to admission and upon admission to the ARU, patient did not have any fever, desaturation, cough or difficulty breathing     > Analgesia   > Continue:    > Acetaminophen 650 mg PO q 4 hr PRN for pain level 4/10 or lesser    > Percocet 5/325 1-2 tabs PO q 6 hr PRN for pain level 5/10 or greater     > Diet:   > Specifications:     []  Low fat/Low cholesterol/High fiber/PATRICIA     []  Low fat/Low cholesterol/High fiber/2 gm Na     [x]  3 carb choices (45 gm/meal)     []  4 carb choices (60 gm/meal)     []  5 carb choices (75 gm/meal)     []  Other:      []  None      > Solids (consistency):    [x]  Regular     []  Easy to chew     []  Dysphagia - Soft & Bite-sized     []  Dysphagia - Minced & moist     []  Dysphagia - Pureed     []  Full liquid     []  Clear liquid      > Liquids (consistency):    [x]  Thin     []  Mildly thick (Nectar thick)     []  Moderately thick (Honey thick)     []  Extremely thick (Pudding thick)      > Fluid restriction: None       9. Personal Protective Equipment ([id number] face mask) was used while interacting with the patient. Patient was using a surgical mask. 10. Patient's progress in rehabilitation and medical issues discussed with the patient. All questions answered to the best of my ability. Care plan discussed with patient and nurse. 11. Total clinical care time is 30 minutes, including review of chart including all labs, radiology, past medical history, and discussion with patient. Greater than 50% of my time was spent in coordination of care and counseling.       Signed:    Gibson Finch MD    October 4, 2021

## 2021-10-04 NOTE — ROUTINE PROCESS
SHIFT CHANGE NOTE FOR [facility]    Bedside and Verbal shift change report given to Lavinia Malloy, RN (oncoming nurse) by Apolinar Silverman RN (offgoing nurse). Report included the following information SBAR, Kardex, MAR and Recent Results. Situation:   Code Status: Full Code   Hospital Day: 16   Problem List:   Hospital Problems  Date Reviewed: 10/3/2021        Codes Class Noted POA    Essential hypertension (Chronic) ICD-10-CM: I10  ICD-9-CM: 401.9  Unknown Yes        Type 2 diabetes mellitus without complication, with long-term current use of insulin (HCC) (Chronic) ICD-10-CM: E11.9, Z79.4  ICD-9-CM: 250.00, V58.67  Unknown Yes    Overview Signed 9/20/2021 11:13 PM by Nila Hilton MD     HbA1c (9/3/2021) = 10.8             Hyperthyroidism (Chronic) ICD-10-CM: E05.90  ICD-9-CM: 242.90  Unknown Yes        Vitamin D insufficiency (Chronic) ICD-10-CM: E55.9  ICD-9-CM: 268.9  9/19/2021 Yes    Overview Signed 9/20/2021 11:11 PM by Nila Hilton MD     Vitamin D 25-Hydroxy (9/19/2021) = 24.0             Status post incision and drainage ICD-10-CM: Z98.890  ICD-9-CM: V45.89  9/9/2021 Yes    Overview Signed 9/20/2021 11:18 PM by Nila Hilton MD     S/P Incision and drainage, with decompression of multiple subfascial layers, right foot; Wound debridement, skin, soft tissue, and muscle, right foot (9/9/2021 - Dr. Kameron Turner)             [id number] ruled out by laboratory testing ICD-10-CM: Z20.822  ICD-9-CM: V01.79  9/8/2021 Yes    Overview Signed 9/20/2021 11:14 PM by Nila Hilton MD     SARS-CoV-2 ([address] [place] [address]) (9/8/2021):  Not detected             History of incision and drainage ICD-10-CM: Z98.890  ICD-9-CM: V45.89  9/4/2021 Yes    Overview Signed 9/20/2021 11:18 PM by Nila Hilton MD     S/P Incision and drainage of right foot (9/4/2021 - Dr. Susanne Hampton)             * (Principal) Diabetic infection of right foot [facility]) ICD-10-CM: E11.628, L08.9  ICD-9-CM: 250.80, 686.9  9/2/2021 Yes Impaired mobility and ADLs ICD-10-CM: Z74.09, Z78.9  ICD-9-CM: V49.89  9/2/2021 Yes              Background:   Past Medical History:   Past Medical History:   Diagnosis Date    COVID-19 ruled out by laboratory testing 9/8/2021    SARS-CoV-2 ([facility]) (9/8/2021):  Not detected    Diabetic infection of right foot ([address] 9/2/2021    Essential hypertension     Hyperthyroidism     Type 2 diabetes mellitus without complication, with long-term current use of insulin ([facility])     HbA1c (9/3/2021) = 10.8    Vitamin D insufficiency 9/19/2021    Vitamin D 25-Hydroxy (9/19/2021) = 24.0        Assessment:   Changes in Assessment throughout shift: No change to previous assessment    Patient has a central line: no   Patient has Bal Cath: no      Last Vitals:     Vitals:    10/02/21 2100 10/03/21 0800 10/03/21 1600 10/03/21 2039   BP: 132/72 (!) 141/80 128/65 124/69   Pulse: 81 86 78 80   Resp: 18 16 16 18   Temp: 97.9 °F (36.6 °C) 97.5 °F (36.4 °C) 97.4 °F (36.3 °C) 98.4 °F (36.9 °C)   SpO2: 100% 100% 100% 99%   Weight:       Height:            PAIN    Pain Assessment    Pain Intensity 1: 0 (10/04/21 0400) Pain Intensity 1: 2 (12/29/14 1105)    Pain Location 1: Foot Pain Location 1: Abdomen    Pain Intervention(s) 1:  (premedicated for dressing change) Pain Intervention(s) 1: Medication (see MAR)  Patient Stated Pain Goal: 0 Patient Stated Pain Goal: 0  o Intervention effective: yes  o Other actions taken for pain:  (premedicated for dressing change)     Skin Assessment  Skin color Skin Color: Appropriate for ethnicity  Condition/Temperature Skin Condition/Temp: Dry, Warm  Integrity Skin Integrity: Wound (add Wound LDA)  Turgor Turgor: Non-tenting  Weekly Pressure Ulcer Documentation  Pressure  Injury Documentation: No Pressure Injury Noted-Pressure Ulcer Prevention Initiated  Wound Prevention & Protection Methods  Orientation of wound Orientation of Wound Prevention: Posterior  Location of Prevention Location of Wound Prevention: Buttocks, Sacrum/Coccyx  Dressing Present Dressing Present : No  Dressing Status    Wound Offloading Wound Offloading (Prevention Methods): Bed, pressure redistribution/air, Pillows, Repositioning, Wheelchair     INTAKE/OUPUT  Date 10/03/21 0700 - 10/04/21 0659 10/04/21 0700 - 10/05/21 0659   Shift 2965-5268 4977-5288 24 Hour Total 7293-9955 8790-8987 24 Hour Total   INTAKE   P.O. 480  480        P. O. 480  480      Shift Total(mL/kg) 480(5)  480(5)      OUTPUT   Urine(mL/kg/hr)           Urine Occurrence(s) 5 x 1 x 6 x      Stool           Stool Occurrence(s) 2 x 1 x 3 x      Shift Total(mL/kg)           480      Weight (kg) 96 96 96 96 96 96       Recommendations:  1. Patient needs and requests: wound vac dressing change,toileting    2. Pending tests/procedures: routine labs     3. Functional Level/Equipment: Partial (one person) /      Fall Precautions:   Fall risk precautions were reinforced with the patient; he was instructed to call for help prior to getting up. The following fall risk precautions were continued: bed/ chair alarms, door signage, yellow bracelet and socks as well as update of the [employer] tool in the patient's room. Willy Score:      HEALS Safety Check    A safety check occurred in the patient's room between off going nurse and oncoming nurse listed above.     The safety check included the below items  Area Items   H  High Alert Medications - Verify all high alert medication drips (heparin, PCA, etc.)   E  Equipment - Suction is set up for ALL patients (with yanker)  - Red plugs utilized for all equipment (IV pumps, etc.)  - WOWs wiped down at end of shift.  - Room stocked with oxygen, suction, and other unit-specific supplies   A  Alarms - Bed alarm is set for fall risk patients  - Ensure chair alarm is in place and activated if patient is up in a chair   L  Lines - Check IV for any infiltration  - Bal bag is empty if patient has a Bal   - Tubing and IV bags are labeled   S  Safety   - Room is clean, patient is clean, and equipment is clean. - Hallways are clear from equipment besides carts. - Fall bracelet on for fall risk patients  - Ensure room is clear and free of clutter  - Suction is set up for ALL patients (with darwinker)  - Hallways are clear from equipment besides carts.    - Isolation precautions followed, supplies available outside room, sign posted     Antelmo Cam RN

## 2021-10-04 NOTE — PROGRESS NOTES
Problem: Mobility Impaired (Adult and Pediatric)  Goal: *Therapy Goal (Edit Goal, Insert Text)  Description: Physical Therapy Short Term Goals  Initiated 9/20/2021, re-assessed 10/04/21, and to be progressed to long term goals. 1.  Patient will move from supine to sit and sit to supine  and roll side to side in bed with modified independence. (met 9/27)   Goal updated: pt will perform bed mobility with independence without side rails on bed. MET 10/04/2021  2. Patient will transfer from bed to chair and chair to bed with Stand-by assistance using the least restrictive device, NWB R LE. (met 9/27, with use of RW)   Goal updated: pt will transfer from bed to chair and chair to bed with Supervision using a RW and NWB R LE.  MET 10/04/2021  3. Patient will perform sit to stand with Stand-by assistance to RW and NWB R LE. (met 9/27/21)   Goals updated: pt will perform sit to stand with supervision to RW and NWB R LE.  MET 10/04/2021  4. (Goal revised) Patient will ambulate with contact guard assist for 50 feet with RW, NWB R LE and hop method. (met 9/27/21)   Goal updated: pt will ambulate 70 ft with RW and SBA on level surfaces, NWB R LE.  MET 10/04/2021  5. Patient will propel w/c 150 ft with supervision on level surfaces using B UE's and L LE. (met 9/27/21)   Goal updated: pt will propel w/c 150 ft with supervision on uneven surfaces outside. Not tested this treatment [facility]    Physical Therapy Long Term Goals  Initiated 9/20/2021 and to be accomplished within 21 day(s) [10/11/21]  1. Patient will transfer from bed to chair and chair to bed with modified independence using the least restrictive device. 2.  Patient will perform sit to stand with modified independence and RW. 3.  Patient will ambulate with supervision for 80 feet with RW, NWB R LE, and hop method. (to manage household distances)  4.   Patient will ascend/descend 4 stairs with one handrail(s) with minimal assistance/contact guard assist. Outcome: Progressing Towards Goal    PHYSICAL THERAPY WEEKLY PROGRESS NOTE    Patient: Shadia Rodgers (14 y.o. male)  Date: 10/4/2021  Diagnosis: Diabetic foot infection ([facility] [address] [E11.628, L08.9] Diabetic infection of right foot ([facility] [address]  Precautions: Fall, NWB  Chart, physical therapy assessment, plan of care and goals were reviewed. Time ZU:2678  Time INH:5306    Patient seen for: Balance activities;Gait training;Patient education;Transfer training; Wheelchair mobility     Time in:1031  Time out:1103    Patient seen for: Balance activities; Therapeutic exercise;Patient education;Transfer training; Wheelchair mobility      Pain:  Pt pain was reported as  0/10 pre-treatment. Pt pain was reported as 0/10 post-treatment. Patient identified with name and : yes    SUBJECTIVE:     Pt is pleasant and cooperative throughout both treatment sessions. Pt acknowledges, \"it feels safer when I  the walker,\" with descending curb compared to pushing RW forward on curb.     OBJECTIVE DATA SUMMARY:       GROSS ASSESSMENT Weekly Progress Assessment 10/4/2021   AROM Within functional limits   Strength Generally decreased, functional   Coordination Within functional limits   Tone Normal   Sensation Intact   PROM [facility]       POSTURE Weekly Progress Assessment 10/4/2021   Posture (WDL) Within defined limits   Posture Assessment         BALANCE Weekly Progress Assessment 10/4/2021    Sitting - Static: Good (unsupported)  Sitting - Dynamic: Good (unsupported)  Standing - Static: Fair  Standing - Dynamic : Impaired     BED/CHAIR/WHEELCHAIR TRANSFERS Initial Assessment Weekly Progress Assessment 10/4/2021   Rolling Right 5 (Stand-by assistance) Independent7 (Independent)   Rolling Left 5 (Stand-by assistance) 7 (Independent)Independent    Supine to Sit 5 (Stand-by assistance) 7 (Independent)Independent    Sit to Stand Minimal assistance Supervision for safety/line management   Sit to Supine  (CGA for R LE & mgm't of wound vac tube) Independent   Transfer Type Other Other   Transfer Assistance Needed 4 (Minimal assistance) (with both methods; vc's fo hand placement w/ squat pivot) 5 (Supervision/setup)   Comments   Pt performs functional transfers at a supervision level for safety and line management. Pt required verbal cuing as he initiated a stand step transfer with RW from RW to the mat table on his right, but then decided to pivot to complete the transfer releasing the RW prior to being safely in front of mat table. Pt requires education re: safety concerns and then demonstrated 3 squat pivot transfers mat table <> w/c at a supervision level with minimal cuing for safety. Car Transfer Minimum assistance (assist for R LE in/out of car & mgm't of door) Supervision for safety   Car Type car simulator in rehab Car Transfer Trainer       [facility] MOBILITY/MANAGEMENT Initial Assessment Weekly Progress Assessment 10/4/2021   Able to Propel (dist) 75 feet  150 Feet   Assistance Required  (min A) Modified independent   Curbs/ramps assistance required 0 (Not tested)  NT   Wheelchair set up assistance required 2 (Maximal assistance)  Minimal assistance   Wheelchair management Manages left brake, Manages right brake (with supervision & vc's)  Manages B brakes     WALKING INDEPENDENCE Initial Assessment Weekly Progress Assessment 10/4/2021   Assistive device Walker, rolling, Gait belt Walker, rolling   Ambulation assistance - level surface 4 (Contact guard assistance) 5 (Supervision/setup)   Distance 35 Feet (ft) 300 Feet (ft) x 1 trial   Comments   Pt ambulates with increasing forward head and rounded shoulder posture as he fatigues with gait trial and requires cuing re: importance of attending to quality and safety of movement and take rest breaks as necessary.    Ambulation assistance - unlevel surfaces    NT       GAIT Weekly Progress Assessment 10/4/2021   Gait Description (WDL) Exceptions to WDL   Gait Abnormalities Decreased step clearance     STEPS/STAIRS Initial Assessment Weekly Progress Assessment 10/4/2021   Steps/Stairs ambulated 1 (4\" height step) 4 (4\") curb step   Assistance Required    (see curb training)   Rail Use Both  NT   Comments     Pt requires increased time and cuing for safety and lifting RW to advance as opposed to pushing RW forward down curb step. Curbs/Ramps Minimum assistance (with RW) Contact guard assistance (4\" curb x 4 reps ascending and descending)       Therapeutic Exercise:  Supine LE Strength Training:  3 Sets of 10 Repetitions:  Right and Left SLR Hip Flexion  Attempted sideliying right LE hip abd but pt was unable to maintain appropriate positioning despite maximal assistance. Therefor, pt participated in [employer] for 2 Sets of 10 Repetitions each right and left    ASSESSMENT:  Pt is progressing requiring decreased physical assistance with curb negotiation and ambulating further distances. However, pt requires encouragement to attend to/focus on quality of movement goals as opposed to focusing on increasing distance. Progression toward goals:  []      Improving appropriately and progressing toward goals  [x]      Improving slowly and progressing toward goals  []      Not making progress toward goals and plan of care will be adjusted     PLAN:  Patient continues to benefit from skilled intervention to address the above impairments. Continue treatment per established plan of care. Discharge Recommendations:  Home Health Physical Therapy  Further Equipment Recommendations for Discharge:  rolling walker and wheelchair 18 inch with gel cushion, removable arm rests, removable and elevating leg rests, anti-tippers and brake extenders. Estimated Discharge Date: 10/08/2021    Activity Tolerance:   Good  Please refer to the flowsheet for vital signs taken during this treatment.   After treatment:   [] Patient left in no apparent distress in bed  [] Patient left in no apparent distress sitting up in chair  [] Patient left in no apparent distress in w/c mobilizing under own power  [] Patient left in no apparent distress dining area  [x] Patient left in no apparent distress mobilizing under own power  [] Call bell left within reach  [] Nursing notified  [] Caregiver present  [] Bed alarm activated   [] Chair alarm activated      Sahra Torres, PT, DPT  10/4/2021

## 2021-10-04 NOTE — PROGRESS NOTES
Problem: Self Care Deficits Care Plan (Adult)  Goal: *Acute Goals and Plan of Care (Insert Text)  Description: Occupational Therapy Goals   Long Term Goals  Initiated 2021 and to be accomplished within 3 week(s), by 10/10/2021. 1. Pt will perform self-feeding with I.  2. Pt will perform grooming with I.  3. Pt will perform UB bathing with Mod I and use of AE PRN. 4. Pt will perform LB bathing with Mod I and use of AE PRN. 5. Pt will perform tub/shower transfer with supv and use of LRAD. 6. Pt will perform UB dressing with Mod I.  7. Pt will perform LB dressing with supv and use of AE PRN. 8. Pt will perform toileting task with Mod I.  9. Pt will perform toilet transfer with supv and use of LRAD. Short Term Goals   Initiated 2021 and to be accomplished within 7 day(s), updated 2021. 1. Pt will perform self-feeding with Mod I. ( 2021)  2. Pt will perform grooming with set-up. ( 2021)  3. Pt will perform UB bathing with SBA and use of AE PRN. ( 2021)  4. Pt will perform LB bathing with Min A and use of AE PRN. ( 2021)  5. Pt will perform tub/shower transfer with Min A and use of LRAD. 6. Pt will perform UB dressing with SBA. ( 2021)  7. Pt will perform LB dressing with Mod A and use of AE PRN. ( 2021)  8. Pt will perform toileting task with Mod A. ( 2021)  9. Pt will perform toilet transfer with CGA and use of LRAD. ( 2021)  Occupational Therapy TREATMENT    Patient: Aline Thomas   60 y.o. Patient identified with name and : Yes    Date: 10/4/2021    First Tx Session  Time In: 1330  Time Out[de-identified] 1430    Diagnosis: Diabetic foot infection ([facility] [address] [E11.628, L08.9]   Precautions: Fall, NWB  Chart, occupational therapy assessment, plan of care, and goals were reviewed. Pain:  Pt reports 0/10 pain or discomfort prior to treatment. Pt reports 010 pain or discomfort post treatment.    Intervention Provided: N/A      SUBJECTIVE: Patient jokingly stated I don't want to be late to class. \"    OBJECTIVE DATA SUMMARY:       THERAPEUTIC EXERCISE Daily Assessment   To increase strength for ADLs. UB Bike up to 15 minutes with Maxa,    Chair raises 3x20.   home exercise program: UB HEP (chess press, chest pulls, butterfly wings, front raise,upright rows, overhead press, tricep extension) 2x15 with 3lb weight in hand. ASSESSMENT:  Pt progressing with POC. Progression toward goals:  []          Improving appropriately and progressing toward goals  []          Improving slowly and progressing toward goals  []          Not making progress toward goals and plan of care will be adjusted     PLAN:  Patient continues to benefit from skilled intervention to address the above impairments. Continue treatment per established plan of care. Discharge Recommendations:  Home Health with asst  Further Equipment Recommendations for Discharge:  bedside commode, transfer bench     Activity Tolerance:  Fair       Estimated LOS:    Please refer to the flowsheet for vital signs taken during this treatment. After treatment:   [x]  Patient left in no apparent distress sitting up in chair   []  Patient left in no apparent distress in bed  []  Call bell left within reach  []  Nursing notified  []  Caregiver present  []  Bed alarm activated    COMMUNICATION/EDUCATION:   [] Home safety education was provided and the patient/caregiver indicated understanding. [x] Patient/family have participated as able in goal setting and plan of care. [] Patient/family agree to work toward stated goals and plan of care. [] Patient understands intent and goals of therapy, but is neutral about his/her participation. [] Patient is unable to participate in goal setting and plan of care.       Iris Valentin, OT     Outcome: Progressing Towards Goal  Goal: Interventions  Outcome: Progressing Towards Goal

## 2021-10-05 LAB
GLUCOSE BLD STRIP.AUTO-MCNC: 108 MG/DL (ref 70–110)
GLUCOSE BLD STRIP.AUTO-MCNC: 110 MG/DL (ref 70–110)
GLUCOSE BLD STRIP.AUTO-MCNC: 115 MG/DL (ref 70–110)
GLUCOSE BLD STRIP.AUTO-MCNC: 189 MG/DL (ref 70–110)
GLUCOSE BLD STRIP.AUTO-MCNC: 197 MG/DL (ref 70–110)

## 2021-10-05 PROCEDURE — 99232 SBSQ HOSP IP/OBS MODERATE 35: CPT | Performed by: INTERNAL MEDICINE

## 2021-10-05 PROCEDURE — 65310000000 HC RM PRIVATE REHAB

## 2021-10-05 PROCEDURE — 74011636637 HC RX REV CODE- 636/637: Performed by: INTERNAL MEDICINE

## 2021-10-05 PROCEDURE — 97535 SELF CARE MNGMENT TRAINING: CPT

## 2021-10-05 PROCEDURE — 74011250637 HC RX REV CODE- 250/637: Performed by: EMERGENCY MEDICINE

## 2021-10-05 PROCEDURE — 74011250636 HC RX REV CODE- 250/636: Performed by: INTERNAL MEDICINE

## 2021-10-05 PROCEDURE — 74011250637 HC RX REV CODE- 250/637: Performed by: INTERNAL MEDICINE

## 2021-10-05 PROCEDURE — 97530 THERAPEUTIC ACTIVITIES: CPT

## 2021-10-05 PROCEDURE — 97116 GAIT TRAINING THERAPY: CPT

## 2021-10-05 PROCEDURE — 82962 GLUCOSE BLOOD TEST: CPT

## 2021-10-05 PROCEDURE — 74011250637 HC RX REV CODE- 250/637: Performed by: FAMILY MEDICINE

## 2021-10-05 RX ADMIN — METOPROLOL TARTRATE 50 MG: 50 TABLET, FILM COATED ORAL at 21:49

## 2021-10-05 RX ADMIN — Medication 5000 UNITS: at 08:06

## 2021-10-05 RX ADMIN — POLYETHYLENE GLYCOL 3350 17 G: 17 POWDER, FOR SOLUTION ORAL at 08:07

## 2021-10-05 RX ADMIN — FAMOTIDINE 20 MG: 20 TABLET, FILM COATED ORAL at 17:14

## 2021-10-05 RX ADMIN — METOPROLOL TARTRATE 50 MG: 50 TABLET, FILM COATED ORAL at 08:07

## 2021-10-05 RX ADMIN — INSULIN LISPRO 2 UNITS: 100 INJECTION, SOLUTION INTRAVENOUS; SUBCUTANEOUS at 17:14

## 2021-10-05 RX ADMIN — HEPARIN SODIUM 5000 UNITS: 5000 INJECTION INTRAVENOUS; SUBCUTANEOUS at 00:55

## 2021-10-05 RX ADMIN — INSULIN LISPRO 2 UNITS: 100 INJECTION, SOLUTION INTRAVENOUS; SUBCUTANEOUS at 12:33

## 2021-10-05 RX ADMIN — HEPARIN SODIUM 5000 UNITS: 5000 INJECTION INTRAVENOUS; SUBCUTANEOUS at 08:06

## 2021-10-05 RX ADMIN — LISINOPRIL 30 MG: 20 TABLET ORAL at 08:06

## 2021-10-05 RX ADMIN — INSULIN GLARGINE 10 UNITS: 100 INJECTION, SOLUTION SUBCUTANEOUS at 08:07

## 2021-10-05 RX ADMIN — METHIMAZOLE 10 MG: 10 TABLET ORAL at 08:06

## 2021-10-05 RX ADMIN — INSULIN LISPRO 2 UNITS: 100 INJECTION, SOLUTION INTRAVENOUS; SUBCUTANEOUS at 08:35

## 2021-10-05 RX ADMIN — FAMOTIDINE 20 MG: 20 TABLET, FILM COATED ORAL at 08:06

## 2021-10-05 RX ADMIN — HEPARIN SODIUM 5000 UNITS: 5000 INJECTION INTRAVENOUS; SUBCUTANEOUS at 17:14

## 2021-10-05 RX ADMIN — INSULIN GLARGINE 10 UNITS: 100 INJECTION, SOLUTION SUBCUTANEOUS at 20:29

## 2021-10-05 NOTE — PROGRESS NOTES
Patient resting comfortably in bed. Alert and oriented x 4. Blood sugar - 45. No signs and symptoms of hypoglycemia noted. 2006-Repeat blood sugar-43. Four ounces of orange juice given. Will repeat blood sugar in 15 minutes. 2022-Blood sugar- 60. Treated with four ounces of orange juice. Will repeat blood sugar in 15 minutes. 2038-Blood sugar-99. A pack of mary crackers given. Snack left at the bedside if needed. 2038- notified of low blood sugars. Instructed to hold Lantus for tonight. 0537-Patient sitting on side of the bed,alert and in no acute distress,requested to have blood sugar taken due to low blood sugar last night. Glucose this am is -115.

## 2021-10-05 NOTE — ROUTINE PROCESS
SHIFT CHANGE NOTE FOR [facility]    Bedside and Verbal shift change report given to Palak Silverman, RN (oncoming nurse) by Marilu Walsh RN (offgoing nurse). Report included the following information SBAR, Kardex, MAR and Recent Results. Situation:   Code Status: Full Code   Hospital Day: 17   Problem List:   Hospital Problems  Date Reviewed: 10/4/2021        Codes Class Noted POA    Essential hypertension (Chronic) ICD-10-CM: I10  ICD-9-CM: 401.9  Unknown Yes        Type 2 diabetes mellitus without complication, with long-term current use of insulin (HCC) (Chronic) ICD-10-CM: E11.9, Z79.4  ICD-9-CM: 250.00, V58.67  Unknown Yes    Overview Signed 9/20/2021 11:13 PM by Charmaine Sheth MD     HbA1c (9/3/2021) = 10.8             Hyperthyroidism (Chronic) ICD-10-CM: E05.90  ICD-9-CM: 242.90  Unknown Yes        Vitamin D insufficiency (Chronic) ICD-10-CM: E55.9  ICD-9-CM: 268.9  9/19/2021 Yes    Overview Signed 9/20/2021 11:11 PM by Charmaine Sheth MD     Vitamin D 25-Hydroxy (9/19/2021) = 24.0             Status post incision and drainage ICD-10-CM: Z98.890  ICD-9-CM: V45.89  9/9/2021 Yes    Overview Signed 9/20/2021 11:18 PM by Charmaine Sheth MD     S/P Incision and drainage, with decompression of multiple subfascial layers, right foot; Wound debridement, skin, soft tissue, and muscle, right foot (9/9/2021 - Dr. Janes Estrada)             KTCQS-75 ruled out by laboratory testing ICD-10-CM: Z20.822  ICD-9-CM: V01.79  9/8/2021 Yes    Overview Signed 9/20/2021 11:14 PM by Charmaine Sheth MD     SARS-CoV-2 ([address] [place] [address]) (9/8/2021):  Not detected             History of incision and drainage ICD-10-CM: Z98.890  ICD-9-CM: V45.89  9/4/2021 Yes    Overview Signed 9/20/2021 11:18 PM by Charmaine Sheth MD     S/P Incision and drainage of right foot (9/4/2021 - Dr. Sona Thorpe.)             * (Principal) Diabetic infection of right foot [facility]) ICD-10-CM: E11.628, L08.9  ICD-9-CM: 250.80, 686.9  9/2/2021 Yes Impaired mobility and ADLs ICD-10-CM: Z74.09, Z78.9  ICD-9-CM: V49.89  9/2/2021 Yes              Background:   Past Medical History:   Past Medical History:   Diagnosis Date    COVID-19 ruled out by laboratory testing 9/8/2021    SARS-CoV-2 ([facility]) (9/8/2021):  Not detected    Diabetic infection of right foot ([address] 9/2/2021    Essential hypertension     Hyperthyroidism     Type 2 diabetes mellitus without complication, with long-term current use of insulin ([facility])     HbA1c (9/3/2021) = 10.8    Vitamin D insufficiency 9/19/2021    Vitamin D 25-Hydroxy (9/19/2021) = 24.0        Assessment:   Changes in Assessment throughout shift: No change to previous assessment    Patient has a central line: no   Patient has Bal Cath: no      Last Vitals:     Vitals:    10/03/21 2039 10/04/21 0919 10/04/21 1608 10/04/21 2055   BP: 124/69 121/75 (!) 145/74 134/71   Pulse: 80 85 83 81   Resp: 18 18 16 18   Temp: 98.4 °F (36.9 °C) 98.5 °F (36.9 °C) 98.5 °F (36.9 °C) 97.4 °F (36.3 °C)   SpO2: 99% 100% 100% 99%   Weight:       Height:            PAIN    Pain Assessment    Pain Intensity 1: 0 (10/05/21 0400) Pain Intensity 1: 2 (12/29/14 1105)    Pain Location 1: Foot Pain Location 1: Abdomen    Pain Intervention(s) 1:  (premedicated for dressing change) Pain Intervention(s) 1: Medication (see MAR)  Patient Stated Pain Goal: 0 Patient Stated Pain Goal: 0  o Intervention effective: yes  o Other actions taken for pain:       Skin Assessment  Skin color Skin Color: Appropriate for ethnicity  Condition/Temperature Skin Condition/Temp: Dry, Warm  Integrity Skin Integrity: Incision (comment)  Turgor Turgor: Non-tenting  Weekly Pressure Ulcer Documentation  Pressure  Injury Documentation: No Pressure Injury Noted-Pressure Ulcer Prevention Initiated  Wound Prevention & Protection Methods  Orientation of wound Orientation of Wound Prevention: Posterior  Location of Prevention Location of Wound Prevention: Buttocks, Sacrum/Coccyx  Dressing Present Dressing Present : No  Dressing Status    Wound Offloading Wound Offloading (Prevention Methods): Bed, pressure redistribution/air     INTAKE/OUPUT  Date 10/04/21 0700 - 10/05/21 0659 10/05/21 0700 - 10/06/21 0659   Shift 5790-7730 1614-0697 24 Hour Total 9278-0350 3252-1583 24 Hour Total   INTAKE   P.O. 720  720        P. O. 720  720      Shift Total(mL/kg) 720(7.5)  720(7.5)      OUTPUT   Urine(mL/kg/hr)           Urine Occurrence(s) 2 x 2 x 4 x      Stool           Stool Occurrence(s) 0 x 0 x 0 x      Shift Total(mL/kg)           720      Weight (kg) 96 96 96 96 96 96       Recommendations:  Patient needs and requests: toileting,treated for hypoglycemia  1. Pending tests/procedures: no labs today     2. Functional Level/Equipment: Partial (one person) /      Fall Precautions:   Fall risk precautions were reinforced with the patient; he was instructed to call for help prior to getting up. The following fall risk precautions were continued: bed/ chair alarms, door signage, yellow bracelet and socks as well as update of the [employer] tool in the patient's room. Willy Score:      HEALS Safety Check    A safety check occurred in the patient's room between off going nurse and oncoming nurse listed above.     The safety check included the below items  Area Items   H  High Alert Medications - Verify all high alert medication drips (heparin, PCA, etc.)   E  Equipment - Suction is set up for ALL patients (with yanker)  - Red plugs utilized for all equipment (IV pumps, etc.)  - WOWs wiped down at end of shift.  - Room stocked with oxygen, suction, and other unit-specific supplies   A  Alarms - Bed alarm is set for fall risk patients  - Ensure chair alarm is in place and activated if patient is up in a chair   L  Lines - Check IV for any infiltration  - Bal bag is empty if patient has a Bal   - Tubing and IV bags are labeled   S  Safety   - Room is clean, patient is clean, and equipment is clean. - Hallways are clear from equipment besides carts. - Fall bracelet on for fall risk patients  - Ensure room is clear and free of clutter  - Suction is set up for ALL patients (with roman)  - Hallways are clear from equipment besides carts.    - Isolation precautions followed, supplies available outside room, sign posted     Apolinar Silverman RN

## 2021-10-05 NOTE — PROGRESS NOTES
[address]  [address] [place] [address], [address]     INPATIENT REHABILITATION  DAILY PROGRESS NOTE     Date: 10/5/2021    Name: Ivonne Kruger Age / Sex: 60 y.o. / male   CSN: [id number] MRN: [id number]   [facility] Date: 9/18/2021 Length of Stay: 17 days     Primary Rehab Diagnosis: Impaired Mobility and ADLs secondary to:  1. Diabetic infection of right foot  2. S/P Incision and drainage, with decompression of multiple subfascial layers, right foot; Wound debridement, skin, soft tissue, and muscle, right foot (9/9/2021 - Dr. Sowmya Casarez)  3. S/P Incision and drainage of right foot (9/4/2021 - Dr. Padma Garcia.)      Subjective:     Patient seen and examined. Blood pressure controlled. No documented fever since admission. Blood glucose fairly controlled. Patient's Complaint:   No significant medical complaints    Pain Control: stable, mild-to-moderate joint symptoms intermittently, reasonably well controlled by current meds      Objective:     Vital Signs:  Patient Vitals for the past 24 hrs:   BP Temp Pulse Resp SpO2   10/05/21 0742 120/71 96.9 °F (36.1 °C) 80 18 99 %   10/04/21 2055 134/71 97.4 °F (36.3 °C) 81 18 99 %   10/04/21 1608 (!) 145/74 98.5 °F (36.9 °C) 83 16 100 %        Physical Examination:  GENERAL SURVEY: Patient is awake, alert, oriented x 3, sitting comfortably on the chair, not in acute respiratory distress.   HEENT: pink palpebral conjunctivae, anicteric sclerae, no nasoaural discharge, moist oral mucosa  NECK: supple, no jugular venous distention, no palpable lymph nodes  CHEST/LUNGS: symmetrical chest expansion, good air entry, clear breath sounds  HEART: adynamic precordium, good S1 S2, no S3, regular rhythm, no murmurs  ABDOMEN: flat, bowel sounds appreciated, soft, non-tender  EXTREMITIES: (+) right foot/ankle wrapped in ACE wrap, pink nailbeds, no edema, full and equal pulses, no calf tenderness   NEUROLOGICAL EXAM: The patient is awake, alert and oriented x 3, able to answer questions fairly appropriately, able to follow 1 and 2 step commands. Able to tell time from the wall clock. Cranial nerves II-XII are grossly intact. No gross sensory deficit. Motor strength is 4 to 4+/5 on BUE and LLE, 4- to 4/5 on the RLE.        Current Medications:  Current Facility-Administered Medications   Medication Dose Route Frequency    insulin lispro (HUMALOG) injection 2 Units  2 Units SubCUTAneous TIDAC    insulin glargine (LANTUS) injection 10 Units  10 Units SubCUTAneous BID    lisinopriL (PRINIVIL, ZESTRIL) tablet 30 mg  30 mg Oral DAILY    metoprolol tartrate (LOPRESSOR) tablet 50 mg  50 mg Oral Q12H    polyethylene glycol (MIRALAX) packet 17 g  17 g Oral DAILY    insulin lispro (HUMALOG) injection   SubCUTAneous TIDAC    heparin (porcine) injection 5,000 Units  5,000 Units SubCUTAneous Q8H    methIMAzole (TAPAZOLE) tablet 10 mg  10 mg Oral DAILY    oxyCODONE-acetaminophen (PERCOCET) 5-325 mg per tablet 1-2 Tablet  1-2 Tablet Oral Q6H PRN    acetaminophen (TYLENOL) tablet 650 mg  650 mg Oral Q4H PRN    cholecalciferol (VITAMIN D3) capsule 5,000 Units  5,000 Units Oral DAILY    famotidine (PEPCID) tablet 20 mg  20 mg Oral BID    magnesium hydroxide (MILK OF MAGNESIA) 400 mg/5 mL oral suspension 30 mL  30 mL Oral DAILY PRN    bisacodyL (DULCOLAX) tablet 10 mg  10 mg Oral Q48H PRN    glucose chewable tablet 16 g  4 Tablet Oral PRN    glucagon (GLUCAGEN) injection 1 mg  1 mg IntraMUSCular PRN    dextrose (D50W) injection syrg 12.5-25 g  25-50 mL IntraVENous PRN    hydrALAZINE (APRESOLINE) tablet 25 mg  25 mg Oral Q8H PRN       Allergies:  No Known Allergies      Functional Progress:    OCCUPATIONAL THERAPY    ON ADMISSION MOST RECENT   Eating  Functional Level: 5   Eating  Functional Level: 5     Grooming  Functional Level: 5   Grooming  Functional Level: 5     Bathing  Functional Level: 3   Bathing  Functional Level: 3     Upper Body Dressing  Functional Level: 4   Upper Body Dressing  Functional Level: 4     Lower Body Dressing  Functional Level: 2   Lower Body Dressing  Functional Level: 2     Toileting  Functional Level: 2   Toileting  Functional Level: 4     Toilet Transfers  Toilet Transfer Score: 4   Toilet Transfers  Toilet Transfer Score: 4     Tub /Shower Transfers  Tub/Shower Transfer Score: 0   Tub/Shower Transfers  Tub/Shower Transfer Score: 0       Legend:   7 - Independent   6 - Modified Independent   5 - Standby Assistance / Supervision / Set-up   4 - Minimum Assistance / Contact Guard Assistance   3 - Moderate Assistance   2 - Maximum Assistance   1 - Total Assistance / Dependent       Lab/Data Review:  Recent Results (from the past 24 hour(s))   GLUCOSE, POC    Collection Time: 10/04/21  4:52 PM   Result Value Ref Range    Glucose (POC) 111 (H) 70 - 110 mg/dL   GLUCOSE, POC    Collection Time: 10/04/21  8:02 PM   Result Value Ref Range    Glucose (POC) 45 (LL) 70 - 110 mg/dL   GLUCOSE, POC    Collection Time: 10/04/21  8:06 PM   Result Value Ref Range    Glucose (POC) 43 (LL) 70 - 110 mg/dL   GLUCOSE, POC    Collection Time: 10/04/21  8:22 PM   Result Value Ref Range    Glucose (POC) 60 (L) 70 - 110 mg/dL   GLUCOSE, POC    Collection Time: 10/04/21  8:38 PM   Result Value Ref Range    Glucose (POC) 99 70 - 110 mg/dL   GLUCOSE, POC    Collection Time: 10/05/21  5:37 AM   Result Value Ref Range    Glucose (POC) 115 (H) 70 - 110 mg/dL   GLUCOSE, POC    Collection Time: 10/05/21  7:32 AM   Result Value Ref Range    Glucose (POC) 197 (H) 70 - 110 mg/dL   GLUCOSE, POC    Collection Time: 10/05/21 11:59 AM   Result Value Ref Range    Glucose (POC) 189 (H) 70 - 110 mg/dL       Assessment:     Primary Rehabilitation Diagnosis  1. Impaired Mobility and ADLs  2. Diabetic infection of right foot  2. S/P Incision and drainage, with decompression of multiple subfascial layers, right foot;  Wound debridement, skin, soft tissue, and muscle, right foot (9/9/2021 - Dr. Brayden Buckley)  3. S/P Incision and drainage of right foot (9/4/2021 - Dr. Jose Aguilra.)    Comorbidities  Patient Active Problem List   Diagnosis Code    Diabetic infection of right foot ([facility] E11.628, L08.9    Essential hypertension I10    Vitamin D insufficiency E55.9    Impaired mobility and ADLs Z74.09, Z78.9    Type 2 diabetes mellitus without complication, with long-term current use of insulin ([facility] E11.9, Z79.4    COVID-19 ruled out by laboratory testing Z20.822    Status post incision and drainage Z98.890    History of incision and drainage Z98.890    Hyperthyroidism E05.90       Plan:     1. Justification for continued stay: Good progression towards established rehabilitation goals. 2. Medical Issues being followed closely:    [x]  Fall and safety precautions     [x]  Wound Care     [x]  Bowel and Bladder Function     [x]  Fluid Electrolyte and Nutrition Balance     [x]  Pain Control      3. Issues that 24 hour rehabilitation nursing is following:    [x]  Fall and safety precautions     [x]  Wound Care     [x]  Bowel and Bladder Function     [x]  Fluid Electrolyte and Nutrition Balance     [x]  Pain Control      [x]  Assistance with and education on in-room safety with transfers to and from the bed, wheelchair, toilet and shower. 4. Acute rehabilitation plan of care:    [x]  Continue current care and rehab. [x]  Physical Therapy           [x]  Occupational Therapy           []  Speech Therapy     []  Hold Rehab until further notice     5. Medications:    [x]  MAR Reviewed     [x]  Continue Present Medications     6. DVT Prophylaxis:      []  Enoxaparin     [x]  Unfractionated Heparin     []  Warfarin     []  NOAC     []  WYATT Stockings     []  Sequential Compression Device     []  None     7. Code status    [x]  Full code     []  Partial code     []  Do not intubate     []  Do not resuscitate     8.  Orders:   > Diabetic infection of right foot; S/P Incision and drainage, with decompression of multiple subfascial layers, right foot; Wound debridement, skin, soft tissue, and muscle, right foot (9/9/2021 - Dr. Danielle Owusu); S/P Incision and drainage of right foot (9/4/2021 - Dr. Chasity Holguin.)   > Nonweightbearing on the right foot   > Wound care nurse consult for WoundVac management   > Wound care instructions: Staff nurse to perform wound vac dressing changes Monday, Wednesday and fridays. Remove old dressing from right plantar foot wound and clean site  with wound spray. Apply skin prep  followed barrier ring then drapes.  Cut hole over wound, apply adaptic  to wound bed then pack wound with black foam. Applied drapes  followed by diskus.  Settings 125mmhg continuous.    > On 9/28/2021, patient had completed the recommended treatment course of Cephalexin 500 mg PO q 6 hr     > Essential hypertension   > On 10/2/2021, increased Lisinopril from 20 mg to 30 mg PO once daily (9AM)   > Continue:    > Lisinopril 30 mg PO once daily (9AM)    > Metoprolol tartrate 25 mg PO q 12 hr (9AM, 9PM)    > Hydralazine 25 mg PO q 8 hr PRN for SBP greater than 170 mmHg    > Hyperthyroidism   > Continue Methimazole 10 mg PO once daily    > Type 2 diabetes mellitus without complication, with long-term current use of insulin   > HbA1c (9/3/2021) = 10.8   > On 9/25/2021, increased Insulin glargine from 15 units to 18 units SC q HS   > On 9/26/2021, started Insulin lispro 2 units SC TID AC   > On 9/28/2021, decreased Insulin glargine from 18 units to 16 units SC q HS   > On 9/29/2021, decreased insulin glargine from 16 units to 14 units SC q HS   > On 10/1/2021, changed Insulin glargine from 14 units SC q HS to 12 units once daily before breakfast   > On 10/2/2021:    > Increased Insulin glargine from 12 units to 15 units once daily before breakfast    > Increased Insulin lispro from 2 units to 4 units SC TID AC   > On 10/3/2021:    > Increased Insulin glargine from 15 units to 18 units once daily before breakfast --> Changed to 10 units SC BID (8AM, 8PM)    > Decrease Insulin lispro from 4 units to 3 units SC TID AC   > Continue:    > Insulin glargine 10 units SC BID (8AM, 8PM)    > Decrease Insulin lispro from 3 units to 2 units SC TID AC    > Insulin lispro sliding scale SC TID AC only    > Vitamin D insufficiency   > Vitamin D 25-Hydroxy (9/19/2021) = 24.0   > On 9/20/2021, started Cholecalciferol 5,000 units PO once daily   > Continue Cholecalciferol 5,000 units PO once daily    > COVID-19 ruled out by laboratory testing   > SARS-CoV-2 ([facility]) (9/8/2021): Not detected   > Prior to admission and upon admission to the ARU, patient did not have any fever, desaturation, cough or difficulty breathing     > Analgesia   > Continue:    > Acetaminophen 650 mg PO q 4 hr PRN for pain level 4/10 or lesser    > Percocet 5/325 1-2 tabs PO q 6 hr PRN for pain level 5/10 or greater     > Diet:   > Specifications:     []  Low fat/Low cholesterol/High fiber/PATRICIA     []  Low fat/Low cholesterol/High fiber/2 gm Na     [x]  3 carb choices (45 gm/meal)     []  4 carb choices (60 gm/meal)     []  5 carb choices (75 gm/meal)     []  Other:      []  None      > Solids (consistency):    [x]  Regular     []  Easy to chew     []  Dysphagia - Soft & Bite-sized     []  Dysphagia - Minced & moist     []  Dysphagia - Pureed     []  Full liquid     []  Clear liquid      > Liquids (consistency):    [x]  Thin     []  Mildly thick (Nectar thick)     []  Moderately thick (Honey thick)     []  Extremely thick (Pudding thick)      > Fluid restriction: None       9. Personal Protective Equipment (OA63 face mask) was used while interacting with the patient. Patient was using a surgical mask. 10. Patient's progress in rehabilitation and medical issues discussed with the patient. All questions answered to the best of my ability. Care plan discussed with patient and nurse.     11. Total clinical care time is 30 minutes, including review of chart including all labs, radiology, past medical history, and discussion with patient. Greater than 50% of my time was spent in coordination of care and counseling.       Signed:    Joseph Montes MD    October 5, 2021

## 2021-10-05 NOTE — PROGRESS NOTES
Problem: Mobility Impaired (Adult and Pediatric)  Goal: *Therapy Goal (Edit Goal, Insert Text)  Description: Physical Therapy Short Term Goals  Initiated 9/20/2021, re-assessed 10/04/21, and to be progressed to long term goals. 1.  Patient will move from supine to sit and sit to supine  and roll side to side in bed with modified independence. (met 9/27)   Goal updated: pt will perform bed mobility with independence without side rails on bed. MET 10/04/2021  2. Patient will transfer from bed to chair and chair to bed with Stand-by assistance using the least restrictive device, NWB R LE. (met 9/27, with use of RW)   Goal updated: pt will transfer from bed to chair and chair to bed with Supervision using a RW and NWB R LE.  MET 10/04/2021  3. Patient will perform sit to stand with Stand-by assistance to RW and NWB R LE. (met 9/27/21)   Goals updated: pt will perform sit to stand with supervision to RW and NWB R LE.  MET 10/04/2021  4. (Goal revised) Patient will ambulate with contact guard assist for 50 feet with RW, NWB R LE and hop method. (met 9/27/21)   Goal updated: pt will ambulate 70 ft with RW and SBA on level surfaces, NWB R LE.  MET 10/04/2021  5. Patient will propel w/c 150 ft with supervision on level surfaces using B UE's and L LE. (met 9/27/21)   Goal updated: pt will propel w/c 150 ft with supervision on uneven surfaces outside. Not tested this treatment [facility]    Physical Therapy Long Term Goals  Initiated 9/20/2021 and to be accomplished within 21 day(s) [10/11/21]  1. Patient will transfer from bed to chair and chair to bed with modified independence using the least restrictive device. 2.  Patient will perform sit to stand with modified independence and RW. 3.  Patient will ambulate with supervision for 80 feet with RW, NWB R LE, and hop method. (to manage household distances)  4.   Patient will ascend/descend 4 stairs with one handrail(s) with minimal assistance/contact guard assist. Outcome: Progressing Towards Goal    PHYSICAL THERAPY TREATMENT    Patient: Renuka Drew (87 y.o. male)  Date: 10/5/2021  Diagnosis: Diabetic foot infection ([facility] [E11.628, L08.9] Diabetic infection of right foot ([facility]  Precautions: Fall, NWB  Chart, physical therapy assessment, plan of care and goals were reviewed. Time In:905  Time Out:1003    Patient seen for: Balance activities;Gait training;Patient education; Family training;Transfer training    Pain:  Pt pain was reported as 0/10 pre-treatment. Pt pain was reported as 0/10 post-treatment. Intervention: N/A    Patient identified with name and : yes    SUBJECTIVE:      Pt is pleasant and cooperative. Pt's wife present during treatment and participated in family education. Both wife and pt verbalized understanding of education provided. Pt reports, \"I have to work on that,\" re: curb negotiation. OBJECTIVE DATA SUMMARY:    Objective:     TRANSFERS Daily Assessment     Transfer Type: Other  Other: stand step with RW  Transfer Assistance : 5 (Supervision/setup)  Sit to Stand Assistance: Supervision   Pt requires supervision for safety with managing wound vac line. GAIT Daily Assessment    Gait Description (WDL) Exceptions to WDL    Gait Abnormalities Decreased step clearance    Assistive device Walker, rolling    Ambulation assistance - level surface 5 (Supervision/setup)    Distance  Distance not challenged. Pt ambulated household distances in hallway 20 feet x 2 trials with PT, 20 feet x 2 trials with his wife providing supervision. Ambulation assistance- uneven surface  NT    Comments Pt ambulates with RW with supervision for safety due to functional weakness and right LE NWB.         STEPS/STAIRS Daily Assessment     Steps/Stairs ambulated NT    Assistance Required  (see curb training)    Rail Use NT    Comments Pt participated in curb negotiation with PT providing CGA for balance and safety and verbal cues for RW management negotiating 2\" curb x 2 repetitions while PT educated pt's wife re: safe guarding techniques. Pt's wife also assisted pt with one trial ascending/descending 4\" curb providing CGA. Pt demonstrated a major LOB ascending the curb with his wife providing CGA with advancing RW onto curb requiring CGA- minimal assistance for safety. Curbs/Ramps Contact guard assistance (4\" curb step)        BALANCE Daily Assessment     Sitting - Static: Good (unsupported)  Sitting - Dynamic: Good (unsupported)  Standing - Static: Fair  Standing - Dynamic : Impaired        WHEELCHAIR MOBILITY Daily Assessment     Pt propels w/c over level surfaces with modified independence. ASSESSMENT:  Pt's wife present for education and demonstrates understanding of use of get belt for safely guarding during gait training and curb negotiation. Progression toward goals:  []      Improving appropriately and progressing toward goals  [x]      Improving slowly and progressing toward goals  []      Not making progress toward goals and plan of care will be adjusted      PLAN:  Patient continues to benefit from skilled intervention to address the above impairments. Continue treatment per established plan of care. Emphasize functional strengthening to promote increased safety and independence with curb negotiation. Discharge Recommendations:  Home Health Physical Therapy  Further Equipment Recommendations for Discharge:  rolling walker and wheelchair 18 inch with removable arm rests and leg rests, gel cushion and anti-tippers      Estimated Discharge Date: 10/08/2021    Activity Tolerance:   Good  Please refer to the flowsheet for vital signs taken during this treatment.     After treatment:   [] Patient left in no apparent distress in bed  [] Patient left in no apparent distress sitting up in chair  [x] Patient left in no apparent distress in w/c mobilizing under own power  [] Patient left in no apparent distress dining area  [] Patient left in no apparent distress mobilizing under own power  [x] Call bell left within reach  [] Nursing notified  [] Caregiver present  [] Bed alarm activated   [x] Chair alarm activated      Rudy Azul PT, DPT  10/5/2021

## 2021-10-05 NOTE — ROUTINE PROCESS
SHIFT CHANGE NOTE FOR [facility]    Bedside and Verbal shift change report given to Malik RN (oncoming nurse) by Artemio Sullivan RN (offgoing nurse). Report included the following information SBAR, Kardex, MAR and Recent Results. Situation:   Code Status: Full Code   Hospital Day: 17   Problem List:   Hospital Problems  Date Reviewed: 10/5/2021        Codes Class Noted POA    Essential hypertension (Chronic) ICD-10-CM: I10  ICD-9-CM: 401.9  Unknown Yes        Type 2 diabetes mellitus without complication, with long-term current use of insulin (HCC) (Chronic) ICD-10-CM: E11.9, Z79.4  ICD-9-CM: 250.00, V58.67  Unknown Yes    Overview Signed 9/20/2021 11:13 PM by Yvrose Hathaway MD     HbA1c (9/3/2021) = 10.8             Hyperthyroidism (Chronic) ICD-10-CM: E05.90  ICD-9-CM: 242.90  Unknown Yes        Vitamin D insufficiency (Chronic) ICD-10-CM: E55.9  ICD-9-CM: 268.9  9/19/2021 Yes    Overview Signed 9/20/2021 11:11 PM by Yvrose Hathaway MD     Vitamin D 25-Hydroxy (9/19/2021) = 24.0             Status post incision and drainage ICD-10-CM: Z98.890  ICD-9-CM: V45.89  9/9/2021 Yes    Overview Signed 9/20/2021 11:18 PM by Yvrose Hathaway MD     S/P Incision and drainage, with decompression of multiple subfascial layers, right foot; Wound debridement, skin, soft tissue, and muscle, right foot (9/9/2021 - Dr. Katherine Whitfield)             [id number] ruled out by laboratory testing ICD-10-CM: Z20.822  ICD-9-CM: V01.79  9/8/2021 Yes    Overview Signed 9/20/2021 11:14 PM by Yvrose Hathaway MD     SARS-CoV-2 ([address] [place] [address]) (9/8/2021):  Not detected             History of incision and drainage ICD-10-CM: Z98.890  ICD-9-CM: V45.89  9/4/2021 Yes    Overview Signed 9/20/2021 11:18 PM by Yvrose Hathaway MD     S/P Incision and drainage of right foot (9/4/2021 - Dr. Agatha Snider.)             * (Principal) Diabetic infection of right foot [facility]) ICD-10-CM: E11.628, L08.9  ICD-9-CM: 250.80, 686.9  9/2/2021 Yes        Impaired mobility and ADLs ICD-10-CM: Z74.09, Z78.9  ICD-9-CM: V49.89  9/2/2021 Yes              Background:   Past Medical History:   Past Medical History:   Diagnosis Date    COVID-19 ruled out by laboratory testing 9/8/2021    SARS-CoV-2 ([facility]) (9/8/2021):  Not detected    Diabetic infection of right foot ([address] 9/2/2021    Essential hypertension     Hyperthyroidism     Type 2 diabetes mellitus without complication, with long-term current use of insulin ([facility])     HbA1c (9/3/2021) = 10.8    Vitamin D insufficiency 9/19/2021    Vitamin D 25-Hydroxy (9/19/2021) = 24.0        Assessment:   Changes in Assessment throughout shift: No change to previous assessment    Patient has a central line: no   Patient has Bal Cath: no      Last Vitals:     Vitals:    10/04/21 0919 10/04/21 1608 10/04/21 2055 10/05/21 0742   BP: 121/75 (!) 145/74 134/71 120/71   Pulse: 85 83 81 80   Resp: 18 16 18 18   Temp: 98.5 °F (36.9 °C) 98.5 °F (36.9 °C) 97.4 °F (36.3 °C) 96.9 °F (36.1 °C)   SpO2: 100% 100% 99% 99%   Weight:       Height:            PAIN    Pain Assessment    Pain Intensity 1: 0 (10/05/21 1103) Pain Intensity 1: 2 (12/29/14 1105)    Pain Location 1: Foot Pain Location 1: Abdomen    Pain Intervention(s) 1:  (premedicated for dressing change) Pain Intervention(s) 1: Medication (see MAR)  Patient Stated Pain Goal: 0 Patient Stated Pain Goal: 0  o Intervention effective: yes  o Other actions taken for pain:       Skin Assessment  Skin color Skin Color: Appropriate for ethnicity  Condition/Temperature Skin Condition/Temp: Dry, Warm  Integrity Skin Integrity: Incision (comment)  Turgor Turgor: Non-tenting  Weekly Pressure Ulcer Documentation  Pressure  Injury Documentation: No Pressure Injury Noted-Pressure Ulcer Prevention Initiated  Wound Prevention & Protection Methods  Orientation of wound Orientation of Wound Prevention: Posterior  Location of Prevention Location of Wound Prevention: Buttocks, Sacrum/Coccyx  Dressing Present Dressing Present : No  Dressing Status    Wound Offloading Wound Offloading (Prevention Methods): Bed, pressure redistribution/air     INTAKE/OUPUT  Date 10/04/21 0700 - 10/05/21 0659 10/05/21 0700 - 10/06/21 0659   Shift 8114-7431 6233-0885 24 Hour Total 2607-5488 4594-2330 24 Hour Total   INTAKE   P.O. 720  720        P. O. 720  720      Shift Total(mL/kg) 720(7.5)  720(7.5)      OUTPUT   Urine(mL/kg/hr)           Urine Occurrence(s) 2 x 2 x 4 x      Stool           Stool Occurrence(s) 0 x 0 x 0 x      Shift Total(mL/kg)           720      Weight (kg) 96 96 96 96 96 96       Recommendations:  Patient needs and requests: toileting,treated for hypoglycemia  1. Pending tests/procedures: no labs today     2. Functional Level/Equipment:   / Wheelchair    Fall Precautions:   Fall risk precautions were reinforced with the patient; he was instructed to call for help prior to getting up. The following fall risk precautions were continued: bed/ chair alarms, door signage, yellow bracelet and socks as well as update of the [place] tool in the patient's room. Willy Score:      HEALS Safety Check    A safety check occurred in the patient's room between off going nurse and oncoming nurse listed above.     The safety check included the below items  Area Items   H  High Alert Medications - Verify all high alert medication drips (heparin, PCA, etc.)   E  Equipment - Suction is set up for ALL patients (with yanker)  - Red plugs utilized for all equipment (IV pumps, etc.)  - WOWs wiped down at end of shift.  - Room stocked with oxygen, suction, and other unit-specific supplies   A  Alarms - Bed alarm is set for fall risk patients  - Ensure chair alarm is in place and activated if patient is up in a chair   L  Lines - Check IV for any infiltration  - Bal bag is empty if patient has a Bal   - Tubing and IV bags are labeled   S  Safety   - Room is clean, patient is clean, and equipment is clean.  - Hallways are clear from equipment besides carts. - Fall bracelet on for fall risk patients  - Ensure room is clear and free of clutter  - Suction is set up for ALL patients (with roman)  - Hallways are clear from equipment besides carts.    - Isolation precautions followed, supplies available outside room, sign posted     Migue Mccurdy RN

## 2021-10-05 NOTE — PROGRESS NOTES
Problem: Self Care Deficits Care Plan (Adult)  Goal: *Acute Goals and Plan of Care (Insert Text)  Description: Occupational Therapy Goals   Long Term Goals  Initiated 2021 and to be accomplished within 3 week(s), by 10/10/2021. 1. Pt will perform self-feeding with I.  2. Pt will perform grooming with I.  3. Pt will perform UB bathing with Mod I and use of AE PRN. 4. Pt will perform LB bathing with Mod I and use of AE PRN. 5. Pt will perform tub/shower transfer with supv and use of LRAD. 6. Pt will perform UB dressing with Mod I.  7. Pt will perform LB dressing with supv and use of AE PRN. 8. Pt will perform toileting task with Mod I.  9. Pt will perform toilet transfer with supv and use of LRAD. Short Term Goals   Initiated 2021 and to be accomplished within 7 day(s), updated 2021. 1. Pt will perform self-feeding with Mod I. ( 2021)  2. Pt will perform grooming with set-up. ( 2021)  3. Pt will perform UB bathing with SBA and use of AE PRN. ( 2021)  4. Pt will perform LB bathing with Min A and use of AE PRN. ( 2021)  5. Pt will perform tub/shower transfer with Min A and use of LRAD. 6. Pt will perform UB dressing with SBA. ( 2021)  7. Pt will perform LB dressing with Mod A and use of AE PRN. ( 2021)  8. Pt will perform toileting task with Mod A. ( 2021)  9. Pt will perform toilet transfer with CGA and use of LRAD. ( 2021)      Outcome: Progressing Towards Goal  Goal: Interventions  Outcome: Progressing Towards Goal    Occupational Therapy TREATMENT    Patient: Jada Paulino   60 y.o. Patient identified with name and : yes    Date: 10/5/2021    First Tx Session  Time In: 1033  Time Out[de-identified] 0001    Diagnosis: Diabetic foot infection ([address] [E11.628, L08.9]   Precautions: Fall, NWB  Chart, occupational therapy assessment, plan of care, and goals were reviewed. Pain:  Pt reports 0/10 pain or discomfort prior to treatment.     Pt reports 0/10 pain or discomfort post treatment. Intervention Provided: NA      SUBJECTIVE:   Patient stated I have done a lot of things here I never though I would have to do 6 weeks ago.     OBJECTIVE DATA SUMMARY:   Extensive family education provided this session. Discussed:  DME for safe return home (BSC, transfer bench). Provided visuals and practice use of both pieces of equipment for family/wife to familiarize with. Bathroom management: use of w/c, walker, and how to transfer with a bench. Providing chair in bathroom for seated ADLs or sitting on toilet to perform. Kitchen tasks: utilized activity analysis for meal prepping and actively getting a meal ready to consume and safe mobility throughout  [employer] mobility: discussed with pt and wife regarding slow progression for community mobility using pain and function as guide    Encouraged pt and wife to plan ahead during all tasks and process through tasks together prior to entering and use of therapists in rehab and [facility] for assistance as needed. ASSESSMENT:  Pt continues to make progress. Family education provided this session with additional time d/t extensive questions from family and patient regarding safe return home. Able to process through home safety (ie bedroom, bathroom, kitchen) with pt and his wife and allow for all questions to be answered or deferred to the appropriate personal.     Pt would benefit from social work visit and conversation with RD. Progression toward goals:  [x]          Improving appropriately and progressing toward goals  []          Improving slowly and progressing toward goals  []          Not making progress toward goals and plan of care will be adjusted     PLAN:  Patient continues to benefit from skilled intervention to address the above impairments. Continue treatment per established plan of care.   Discharge Recommendations:  Home Health  Further Equipment Recommendations for Discharge:  bedside commode, transfer bench, or 3-in-1 commode      Activity Tolerance:  Good    Estimated LOS: Per original OT POC    Please refer to the flowsheet for vital signs taken during this treatment. After treatment:   [x]  Patient left in no apparent distress sitting up in chair   []  Patient left in no apparent distress in bed  [x]  Call bell left within reach  []  Nursing notified  [x]  Caregiver/Family present  [x]  Chair alarm activated    COMMUNICATION/EDUCATION:   [x] Home safety education was provided and the patient/caregiver indicated understanding. [x] Patient/family have participated as able in goal setting and plan of care. [] Patient/family agree to work toward stated goals and plan of care. [] Patient understands intent and goals of therapy, but is neutral about his/her participation. [] Patient is unable to participate in goal setting and plan of care.       Samuel Mitchell, OTD, OTR/L

## 2021-10-06 ENCOUNTER — HOME HEALTH ADMISSION (OUTPATIENT)
Dept: HOME HEALTH SERVICES | Facility: HOME HEALTH | Age: 56
End: 2021-10-06
Payer: COMMERCIAL

## 2021-10-06 LAB
GLUCOSE BLD STRIP.AUTO-MCNC: 119 MG/DL (ref 70–110)
GLUCOSE BLD STRIP.AUTO-MCNC: 174 MG/DL (ref 70–110)
GLUCOSE BLD STRIP.AUTO-MCNC: 224 MG/DL (ref 70–110)
GLUCOSE BLD STRIP.AUTO-MCNC: 230 MG/DL (ref 70–110)
GLUCOSE BLD STRIP.AUTO-MCNC: 71 MG/DL (ref 70–110)
GLUCOSE BLD STRIP.AUTO-MCNC: 76 MG/DL (ref 70–110)

## 2021-10-06 PROCEDURE — 97116 GAIT TRAINING THERAPY: CPT

## 2021-10-06 PROCEDURE — 74011250637 HC RX REV CODE- 250/637: Performed by: INTERNAL MEDICINE

## 2021-10-06 PROCEDURE — 74011250636 HC RX REV CODE- 250/636: Performed by: INTERNAL MEDICINE

## 2021-10-06 PROCEDURE — 97530 THERAPEUTIC ACTIVITIES: CPT

## 2021-10-06 PROCEDURE — 97605 NEG PRS WND THER DME<=50SQCM: CPT

## 2021-10-06 PROCEDURE — 65310000000 HC RM PRIVATE REHAB

## 2021-10-06 PROCEDURE — 74011250637 HC RX REV CODE- 250/637: Performed by: FAMILY MEDICINE

## 2021-10-06 PROCEDURE — 82962 GLUCOSE BLOOD TEST: CPT

## 2021-10-06 PROCEDURE — 74011636637 HC RX REV CODE- 636/637: Performed by: INTERNAL MEDICINE

## 2021-10-06 PROCEDURE — 77030025414 HC DRSG VAC ASST SMPLC KCON -B

## 2021-10-06 PROCEDURE — 97110 THERAPEUTIC EXERCISES: CPT

## 2021-10-06 PROCEDURE — 99232 SBSQ HOSP IP/OBS MODERATE 35: CPT | Performed by: INTERNAL MEDICINE

## 2021-10-06 PROCEDURE — 74011250637 HC RX REV CODE- 250/637: Performed by: EMERGENCY MEDICINE

## 2021-10-06 RX ORDER — INSULIN GLARGINE 100 [IU]/ML
20 INJECTION, SOLUTION SUBCUTANEOUS
COMMUNITY
End: 2021-10-08 | Stop reason: SDUPTHER

## 2021-10-06 RX ORDER — INSULIN LISPRO 100 [IU]/ML
3 INJECTION, SOLUTION INTRAVENOUS; SUBCUTANEOUS
Status: DISCONTINUED | OUTPATIENT
Start: 2021-10-07 | End: 2021-10-08 | Stop reason: HOSPADM

## 2021-10-06 RX ADMIN — INSULIN GLARGINE 10 UNITS: 100 INJECTION, SOLUTION SUBCUTANEOUS at 09:09

## 2021-10-06 RX ADMIN — FAMOTIDINE 20 MG: 20 TABLET, FILM COATED ORAL at 18:48

## 2021-10-06 RX ADMIN — Medication 5000 UNITS: at 10:04

## 2021-10-06 RX ADMIN — LISINOPRIL 30 MG: 20 TABLET ORAL at 10:04

## 2021-10-06 RX ADMIN — INSULIN LISPRO 2 UNITS: 100 INJECTION, SOLUTION INTRAVENOUS; SUBCUTANEOUS at 09:10

## 2021-10-06 RX ADMIN — INSULIN LISPRO 2 UNITS: 100 INJECTION, SOLUTION INTRAVENOUS; SUBCUTANEOUS at 09:11

## 2021-10-06 RX ADMIN — HEPARIN SODIUM 5000 UNITS: 5000 INJECTION INTRAVENOUS; SUBCUTANEOUS at 10:03

## 2021-10-06 RX ADMIN — INSULIN GLARGINE 10 UNITS: 100 INJECTION, SOLUTION SUBCUTANEOUS at 21:55

## 2021-10-06 RX ADMIN — FAMOTIDINE 20 MG: 20 TABLET, FILM COATED ORAL at 10:04

## 2021-10-06 RX ADMIN — INSULIN LISPRO 2 UNITS: 100 INJECTION, SOLUTION INTRAVENOUS; SUBCUTANEOUS at 12:43

## 2021-10-06 RX ADMIN — METOPROLOL TARTRATE 50 MG: 50 TABLET, FILM COATED ORAL at 10:04

## 2021-10-06 RX ADMIN — OXYCODONE HYDROCHLORIDE AND ACETAMINOPHEN 1 TABLET: 5; 325 TABLET ORAL at 10:53

## 2021-10-06 RX ADMIN — HEPARIN SODIUM 5000 UNITS: 5000 INJECTION INTRAVENOUS; SUBCUTANEOUS at 02:44

## 2021-10-06 RX ADMIN — METOPROLOL TARTRATE 50 MG: 50 TABLET, FILM COATED ORAL at 21:55

## 2021-10-06 RX ADMIN — METHIMAZOLE 10 MG: 10 TABLET ORAL at 10:04

## 2021-10-06 RX ADMIN — HEPARIN SODIUM 5000 UNITS: 5000 INJECTION INTRAVENOUS; SUBCUTANEOUS at 17:34

## 2021-10-06 RX ADMIN — POLYETHYLENE GLYCOL 3350 17 G: 17 POWDER, FOR SOLUTION ORAL at 10:04

## 2021-10-06 NOTE — PROGRESS NOTES
[address]  [address] [place] [address], [address]     INPATIENT REHABILITATION  DAILY PROGRESS NOTE     Date: 10/6/2021    Name: Carmella Zaragoza Age / Sex: 60 y.o. / male   CSN: [id number] MRN: [id number]   [facility] Date: 9/18/2021 Length of Stay: 18 days     Primary Rehab Diagnosis: Impaired Mobility and ADLs secondary to:  1. Diabetic infection of right foot  2. S/P Incision and drainage, with decompression of multiple subfascial layers, right foot; Wound debridement, skin, soft tissue, and muscle, right foot (9/9/2021 - Dr. Petra Caro)  3. S/P Incision and drainage of right foot (9/4/2021 - Dr. Hernesto Angelo.)      Subjective:     Patient seen and examined. Blood pressure controlled. No documented fever since admission. Blood glucose fairly controlled. Patient's Complaint:   No significant medical complaints    Pain Control: stable, mild-to-moderate joint symptoms intermittently, reasonably well controlled by current meds      Objective:     Vital Signs:  Patient Vitals for the past 24 hrs:   BP Temp Pulse Resp SpO2   10/06/21 1615 (!) 142/80 98 °F (36.7 °C) 77 19 100 %   10/06/21 0745 133/78 97.7 °F (36.5 °C) 86 19 100 %   10/05/21 2052 125/68 98 °F (36.7 °C) 74 18 100 %        Physical Examination:  GENERAL SURVEY: Patient is awake, alert, oriented x 3, sitting comfortably on the chair, not in acute respiratory distress.   HEENT: pink palpebral conjunctivae, anicteric sclerae, no nasoaural discharge, moist oral mucosa  NECK: supple, no jugular venous distention, no palpable lymph nodes  CHEST/LUNGS: symmetrical chest expansion, good air entry, clear breath sounds  HEART: adynamic precordium, good S1 S2, no S3, regular rhythm, no murmurs  ABDOMEN: flat, bowel sounds appreciated, soft, non-tender  EXTREMITIES: (+) right foot/ankle wrapped in ACE wrap, pink nailbeds, no edema, full and equal pulses, no calf tenderness   NEUROLOGICAL EXAM: The patient is awake, alert and oriented x 3, able to answer questions fairly appropriately, able to follow 1 and 2 step commands. Able to tell time from the wall clock. Cranial nerves II-XII are grossly intact. No gross sensory deficit. Motor strength is 4 to 4+/5 on BUE and LLE, 4- to 4/5 on the RLE.        Current Medications:  Current Facility-Administered Medications   Medication Dose Route Frequency    insulin lispro (HUMALOG) injection 2 Units  2 Units SubCUTAneous TIDAC    insulin glargine (LANTUS) injection 10 Units  10 Units SubCUTAneous BID    lisinopriL (PRINIVIL, ZESTRIL) tablet 30 mg  30 mg Oral DAILY    metoprolol tartrate (LOPRESSOR) tablet 50 mg  50 mg Oral Q12H    polyethylene glycol (MIRALAX) packet 17 g  17 g Oral DAILY    insulin lispro (HUMALOG) injection   SubCUTAneous TIDAC    heparin (porcine) injection 5,000 Units  5,000 Units SubCUTAneous Q8H    methIMAzole (TAPAZOLE) tablet 10 mg  10 mg Oral DAILY    oxyCODONE-acetaminophen (PERCOCET) 5-325 mg per tablet 1-2 Tablet  1-2 Tablet Oral Q6H PRN    acetaminophen (TYLENOL) tablet 650 mg  650 mg Oral Q4H PRN    cholecalciferol (VITAMIN D3) capsule 5,000 Units  5,000 Units Oral DAILY    famotidine (PEPCID) tablet 20 mg  20 mg Oral BID    magnesium hydroxide (MILK OF MAGNESIA) 400 mg/5 mL oral suspension 30 mL  30 mL Oral DAILY PRN    bisacodyL (DULCOLAX) tablet 10 mg  10 mg Oral Q48H PRN    glucose chewable tablet 16 g  4 Tablet Oral PRN    glucagon (GLUCAGEN) injection 1 mg  1 mg IntraMUSCular PRN    dextrose (D50W) injection syrg 12.5-25 g  25-50 mL IntraVENous PRN    hydrALAZINE (APRESOLINE) tablet 25 mg  25 mg Oral Q8H PRN       Allergies:  No Known Allergies      Functional Progress:    PHYSICAL THERAPY    ON ADMISSION MOST RECENT   Wheelchair Mobility/Management  Able to Propel (ft): 75 feet  Functional Level:  (min A)  Curbs/Ramps Assist Required (FIM Score): 0 (Not tested)  Wheelchair Setup Assist Required : 2 (Maximal assistance)  Wheelchair Management: Manages left brake, Manages right brake (with supervision & vc's) Wheelchair Mobility/Management  Able to Propel (ft): 160 feet  Functional Level: 6  Curbs/Ramps Assist Required (FIM Score):  (SBA for ramps/incines; using B UE's & L LE)  Wheelchair Setup Assist Required : 4 (Contact guard assistance) (with R footrest)  Wheelchair Management: Manages left brake, Manages right brake (needs assist/verbal cues for R footrest)     Gait  Amount of Assistance: 4 (Contact guard assistance)  Distance (ft): 35 Feet (ft)  Assistive Device: Walker, rolling, Gait belt Gait  Amount of Assistance: 5 (Supervision/setup)  Distance (ft): 118 Feet (ft) (x 1 trial, 208 Feet x 1 trial over a variety of surfaces)  Assistive Device: Walker, rolling     Balance-Sitting/Standing  Sitting - Static: Good (unsupported)  Sitting - Dynamic: Fair (occasional) (+)  Standing - Static: Fair (RW support)  Standing - Dynamic : Impaired Balance-Sitting/Standing  Sitting - Static: Good (unsupported)  Sitting - Dynamic: Good (unsupported)  Standing - Static: Fair  Standing - Dynamic : Impaired     Bed/Mat Mobility  Rolling Right : 5 (Stand-by assistance)  Rolling Left : 5 (Stand-by assistance)  Supine to Sit : 5 (Stand-by assistance)  Sit to Supine :  (CGA for R LE & mgm't of wound vac tube) Bed/Mat Mobility  Rolling Right : 6 (Modified independent)  Rolling Left : 6 (Modified independent)  Supine to Sit : 6 (Modified independent)  Sit to Supine : 6 (Modified independent)     Transfers  Transfer Type: Other  Other: stand pivot with RW; and lateral squat pivot  Transfer Assistance : 4 (Minimal assistance) (with both methods; vc's fo hand placement w/ squat pivot)  Sit to Stand Assistance: Minimal assistance  Car Transfers: Minimum assistance (assist for R LE in/out of car & mgm't of door)  Car Type: car simulator in rehab Transfers  Transfer Type:  Other  Other: stand step with RW  Transfer Assistance : 5 (Supervision/setup)  Sit to Stand Assistance: Supervision  Car Transfers: Supervision  Car Type: Car Transfer Trainer     Steps or Stairs  Steps/Stairs Ambulated (#): 1 (4\" height step)  Level of Assist : 3 (Moderate assistance)  Rail Use: Both Steps or Stairs  Steps/Stairs Ambulated (#): 3 (4\")  Level of Assist :  (see curb training)  Rail Use: Both         Lab/Data Review:  Recent Results (from the past 24 hour(s))   GLUCOSE, POC    Collection Time: 10/05/21  8:27 PM   Result Value Ref Range    Glucose (POC) 108 70 - 110 mg/dL   GLUCOSE, POC    Collection Time: 10/06/21  8:04 AM   Result Value Ref Range    Glucose (POC) 230 (H) 70 - 110 mg/dL   GLUCOSE, POC    Collection Time: 10/06/21 12:30 PM   Result Value Ref Range    Glucose (POC) 224 (H) 70 - 110 mg/dL       Assessment:     Primary Rehabilitation Diagnosis  1. Impaired Mobility and ADLs  2. Diabetic infection of right foot  2. S/P Incision and drainage, with decompression of multiple subfascial layers, right foot; Wound debridement, skin, soft tissue, and muscle, right foot (9/9/2021 - Dr. Velia Carlos)  3. S/P Incision and drainage of right foot (9/4/2021 - Dr. Jairo Hernandez)    Comorbidities  Patient Active Problem List   Diagnosis Code    Diabetic infection of right foot ([facility] E11.628, L08.9    Essential hypertension I10    Vitamin D insufficiency E55.9    Impaired mobility and ADLs Z74.09, Z78.9    Type 2 diabetes mellitus without complication, with long-term current use of insulin ([facility] E11.9, Z79.4    COVID-19 ruled out by laboratory testing Z20.822    Status post incision and drainage Z98.890    History of incision and drainage Z98.890    Hyperthyroidism E05.90       Plan:     1. Justification for continued stay: Good progression towards established rehabilitation goals.     2. Medical Issues being followed closely:    [x]  Fall and safety precautions     [x]  Wound Care     [x]  Bowel and Bladder Function     [x]  Fluid Electrolyte and Nutrition Balance     [x]  Pain Control      3. Issues that 24 hour rehabilitation nursing is following:    [x]  Fall and safety precautions     [x]  Wound Care     [x]  Bowel and Bladder Function     [x]  Fluid Electrolyte and Nutrition Balance     [x]  Pain Control      [x]  Assistance with and education on in-room safety with transfers to and from the bed, wheelchair, toilet and shower. 4. Acute rehabilitation plan of care:    [x]  Continue current care and rehab. [x]  Physical Therapy           [x]  Occupational Therapy           []  Speech Therapy     []  Hold Rehab until further notice     5. Medications:    [x]  MAR Reviewed     [x]  Continue Present Medications     6. DVT Prophylaxis:      []  Enoxaparin     [x]  Unfractionated Heparin     []  Warfarin     []  NOAC     []  WYATT Stockings     []  Sequential Compression Device     []  None     7. Code status    [x]  Full code     []  Partial code     []  Do not intubate     []  Do not resuscitate     8. Orders:   > Diabetic infection of right foot; S/P Incision and drainage, with decompression of multiple subfascial layers, right foot; Wound debridement, skin, soft tissue, and muscle, right foot (9/9/2021 - Dr. Praneeth Piper); S/P Incision and drainage of right foot (9/4/2021 - Dr. Mirian Hannon.)   > Nonweightbearing on the right foot   > Wound care nurse consult for WoundVac management   > Wound care instructions: Staff nurse to perform wound vac dressing changes Monday, Wednesday and fridays. Remove old dressing from right plantar foot wound and clean site  with wound spray. Apply skin prep  followed barrier ring then drapes. Cut hole over wound, apply adaptic  to wound bed then pack wound with black foam. Applied drapes  followed by diskus.  Settings 125mmhg continuous.    > On 9/28/2021, patient had completed the recommended treatment course of Cephalexin 500 mg PO q 6 hr    > Spoke with Podiatry (Dr. Praneeth Piper) via telephone and coordinated care of the patient. He recommended that on discharge on Friday (10/8/2021), that instead of dischargin im with a WoundVac, to instead discharge the patient with a wet to dry dressing since he will be inspecting the wound at his office on the day of discharge. He will put a WoundVac at his office after he inspects the wound.     > Pictures of the patient's wound was taken by Wound Care nurse and these were sent to the office of Dr. Praneeth Piper via fax     > Essential hypertension   > On 10/2/2021, increased Lisinopril from 20 mg to 30 mg PO once daily (9AM)   > Continue:    > Lisinopril 30 mg PO once daily (9AM)    > Metoprolol tartrate 25 mg PO q 12 hr (9AM, 9PM)    > Hydralazine 25 mg PO q 8 hr PRN for SBP greater than 170 mmHg    > Hyperthyroidism   > Continue Methimazole 10 mg PO once daily    > Type 2 diabetes mellitus without complication, with long-term current use of insulin   > HbA1c (9/3/2021) = 10.8   > On 9/25/2021, increased Insulin glargine from 15 units to 18 units SC q HS   > On 9/26/2021, started Insulin lispro 2 units SC TID AC   > On 9/28/2021, decreased Insulin glargine from 18 units to 16 units SC q HS   > On 9/29/2021, decreased insulin glargine from 16 units to 14 units SC q HS   > On 10/1/2021, changed Insulin glargine from 14 units SC q HS to 12 units once daily before breakfast   > On 10/2/2021:    > Increased Insulin glargine from 12 units to 15 units once daily before breakfast    > Increased Insulin lispro from 2 units to 4 units SC TID AC   > On 10/3/2021:    > Increased Insulin glargine from 15 units to 18 units once daily before breakfast --> Changed to 10 units SC BID (8AM, 8PM)    > Decrease Insulin lispro from 4 units to 3 units SC TID AC   > On 10/5/2021, decreased Insulin lispro from 3 units to 2 units SC TID AC   > Discontinue Insulin lispro sliding scale SC TID AC only   > Continue:    > Insulin glargine 10 units SC BID (8AM, 8PM)    > Increase Insulin lispro from 2 units to 3 units SC TID AC    > Vitamin D insufficiency   > Vitamin D 25-Hydroxy (9/19/2021) = 24.0   > On 9/20/2021, started Cholecalciferol 5,000 units PO once daily   > Continue Cholecalciferol 5,000 units PO once daily    > COVID-19 ruled out by laboratory testing   > SARS-CoV-2 ([facility]) (9/8/2021): Not detected   > Prior to admission and upon admission to the ARU, patient did not have any fever, desaturation, cough or difficulty breathing     > Analgesia   > Continue:    > Acetaminophen 650 mg PO q 4 hr PRN for pain level 4/10 or lesser    > Percocet 5/325 1-2 tabs PO q 6 hr PRN for pain level 5/10 or greater     > Diet:   > Specifications:     []  Low fat/Low cholesterol/High fiber/PATRICIA     []  Low fat/Low cholesterol/High fiber/2 gm Na     [x]  3 carb choices (45 gm/meal)     []  4 carb choices (60 gm/meal)     []  5 carb choices (75 gm/meal)     []  Other:      []  None      > Solids (consistency):    [x]  Regular     []  Easy to chew     []  Dysphagia - Soft & Bite-sized     []  Dysphagia - Minced & moist     []  Dysphagia - Pureed     []  Full liquid     []  Clear liquid      > Liquids (consistency):    [x]  Thin     []  Mildly thick (Nectar thick)     []  Moderately thick (Honey thick)     []  Extremely thick (Pudding thick)      > Fluid restriction: None       9. Personal Protective Equipment (IA94 face mask) was used while interacting with the patient. Patient was using a surgical mask. 10. Patient's progress in rehabilitation and medical issues discussed with the patient. All questions answered to the best of my ability. Care plan discussed with patient and nurse. 11. Total clinical care time is 30 minutes, including review of chart including all labs, radiology, past medical history, and discussion with patient. Greater than 50% of my time was spent in coordination of care and counseling.       Signed:    Madonna Siegel MD    October 6, 2021

## 2021-10-06 NOTE — WOUND CARE
Physical Exam  Musculoskeletal:        Feet:         Focused assessment   Patient received sitting up on side of bed. A & O x 3, wife and son at bedside. Patient assisted to reclining position in bed with foot elevated. Right posterior foot s/p I/D. 14x1.5 x 1cm. Mostly granulating with some pale pink areas. Kim-wound edges macerated slightly with some callusing towards the upper portion of the foot. Dorsal foot skin tears. Old dressing removed from right dorsal and plantar wounds and site cleaned with wound spray. Silicone dressing applied to skin tears on dorsum of foot. Skin prep and barrier ring applied kim-wound,  followed by drapes. Hole cut over wound then wound packed with one strip of adaptic and on piece of black foam. This is bridged to protected skin on dorsum of foot and drapes applied followed by diskus. Vac intiated and positive seal obtained. Settings 150mmhg continuous. Family and patient with many questions regarding wound care and home life with wound vac. They are answered. Wife provided verbal instruction for wet-dry dressings in case of vac malfunction or HHA unable to visit patient. Care discussed with Elvin Robertson, . Care turned over to nursing staff at this time. Jalen Owens RN, BSN, [facility]

## 2021-10-06 NOTE — WOUND CARE
Physical Exam   Comparison of photos    Admission:        10/6/2021      Kailyn Days.  Roman RN, BSN, [facility]

## 2021-10-06 NOTE — PROGRESS NOTES
Problem: Self Care Deficits Care Plan (Adult)  Goal: *Acute Goals and Plan of Care (Insert Text)  Description: Occupational Therapy Goals   Long Term Goals  Initiated 2021 and to be accomplished within 3 week(s), by 10/10/2021. 1. Pt will perform self-feeding with I.  2. Pt will perform grooming with I.  3. Pt will perform UB bathing with Mod I and use of AE PRN. 4. Pt will perform LB bathing with Mod I and use of AE PRN. 5. Pt will perform tub/shower transfer with supv and use of LRAD. 6. Pt will perform UB dressing with Mod I.  7. Pt will perform LB dressing with supv and use of AE PRN. 8. Pt will perform toileting task with Mod I.  9. Pt will perform toilet transfer with supv and use of LRAD. Short Term Goals   Initiated 2021 and to be accomplished within 7 day(s), updated 2021. 1. Pt will perform self-feeding with Mod I. ( 2021)  2. Pt will perform grooming with set-up. ( 2021)  3. Pt will perform UB bathing with SBA and use of AE PRN. ( 2021)  4. Pt will perform LB bathing with Min A and use of AE PRN. ( 2021)  5. Pt will perform tub/shower transfer with Min A and use of LRAD. 6. Pt will perform UB dressing with SBA. ( 2021)  7. Pt will perform LB dressing with Mod A and use of AE PRN. ( 2021)  8. Pt will perform toileting task with Mod A. ( 2021)  9. Pt will perform toilet transfer with CGA and use of LRAD. ( 2021)  Occupational Therapy TREATMENT    Patient: Inocente Latham   60 y.o. Patient identified with name and : Yes    Date: 10/6/2021    First Tx Session  Time In: 56  Time Out[de-identified] 1200  Diagnosis: Diabetic foot infection ([facility] [E11.628, L08.9]   Precautions: Fall, NWB  Chart, occupational therapy assessment, plan of care, and goals were reviewed. Pain:  Pt reports 0/10 pain or discomfort prior to treatment. Pt reports 0/10 pain or discomfort post treatment.    Intervention Provided: N/A      SUBJECTIVE: Patient stated I feel groggy.     OBJECTIVE DATA SUMMARY:     THERAPEUTIC ACTIVITY Daily Assessment    24 piece puzzle to increase standing balance and problem solving for ADLs. Pt able to stand up to ~20 minutes x2. Pt required Maxa to place perimeter of puzzle together requiring 45 minutes (ie step by step vcs and presenting  puzzle piece one at a time). Pt noted to have had pain medicine 15 minutes before task. THERAPEUTIC EXERCISE Daily Assessment    UB Bike up to 15 minutes at [facility] resistance. Chair raises 3x20 on low mat using blocks     MOBILITY/TRANSFERS Daily Assessment     W/c to mat with CGA. Pt instructed to problem solve transfer and verbalize steps to OT. Pt required asst to problem solve transfer. Pt showed decreased safety with RW and management of wound vac cord. W/c to EOB with total asst to manage wound vac and CGA for balance. ASSESSMENT:  Pt increasing standing tolerance/strength/balance but having difficulty this session with problem solving affecting safety with functional transfers. Progression toward goals:  [x]          Improving appropriately and progressing toward goals  []          Improving slowly and progressing toward goals  []          Not making progress toward goals and plan of care will be adjusted     PLAN:  Patient continues to benefit from skilled intervention to address the above impairments. Continue treatment per established plan of care. Discharge Recommendations:  Home Health with 24 hr asst   Further Equipment Recommendations for Discharge:  bedside commode, transfer bench      Activity Tolerance:  Fair       Estimated LOS: 10/8/21    Please refer to the flowsheet for vital signs taken during this treatment.   After treatment:   []  Patient left in no apparent distress sitting up in chair   [x]  Patient left in no apparent distress in bed  []  Call bell left within reach  []  Nursing notified  []  Caregiver present  []  Bed alarm activated    COMMUNICATION/EDUCATION:   [] Home safety education was provided and the patient/caregiver indicated understanding. [x] Patient/family have participated as able in goal setting and plan of care. [] Patient/family agree to work toward stated goals and plan of care. [] Patient understands intent and goals of therapy, but is neutral about his/her participation. [] Patient is unable to participate in goal setting and plan of care.       Jennifer Ramirez, OT     Outcome: Progressing Towards Goal  Goal: Interventions  Outcome: Progressing Towards Goal

## 2021-10-06 NOTE — INTERDISCIPLINARY ROUNDS
[facility] PHYSICAL REHABILITATION  [address] [place] [address], [address]    INPATIENT REHABILITATION  PRE-TEAM CONFERENCE SUMMARY     Date of Conference: 10/7/2021    Patient Information:        Name: Roberta Nielsen Age / Sex: 60 y.o. / male   CSN: [id number] MRN: [id number]   Admit Date: 9/18/2021 Length of Stay: 18 days     Primary Rehabilitation Diagnosis  1. Impaired Mobility and ADLs  2. Diabetic infection of right foot  3. S/P Incision and drainage, with decompression of multiple subfascial layers, right foot; Wound debridement, skin, soft tissue, and muscle, right foot (9/9/2021 - Dr. Danielle Owusu)  4.  S/P Incision and drainage of right foot (9/4/2021 - Dr. Chasity Holguin.)    Comorbidities  Patient Active Problem List   Diagnosis Code    Diabetic infection of right foot ([facility] E11.628, L08.9    Essential hypertension I10    Vitamin D insufficiency E55.9    Impaired mobility and ADLs Z74.09, Z78.9    Type 2 diabetes mellitus without complication, with long-term current use of insulin ([facility] E11.9, Z79.4    COVID-19 ruled out by laboratory testing Z20.822    Status post incision and drainage Z98.890    History of incision and drainage Z98.890    Hyperthyroidism E05.90          Therapy:     FIM SCORES Initial Assessment Weekly Progress Assessment 10/6/2021   Eating Functional Level: 5  Comments: set-up  7   Swallowing     Grooming 5  6   Bathing 3  5      Upper Body Dressing Functional Level: 4  Items Applied/Steps Completed: Pullover (4 steps)  Comments: SBA/ Min A  6   Lower Body Dressing Functional Level: 2  Items Applied/Steps Completed: Elastic waist pants (3 steps), Shoe, left (1 step), Underpants (3 steps)  Comments: Care coordinated with Cheryl Gonzalez RN for nurse to disconnect wound vac in order to don/doff boxers and elastic waist shorts; Min A to don L sock (1 step)  4   Toileting Functional Level: 2  Comments: BSC at the bedside; CM & hygiene performed in stance with RW while adhering to NWB R LE; Max A for pants down/up  5   Bladder 0 0   Bowel  0 0   Toilet Transfer [place] Toilet Transfer Score: 4  Comments: Min A w/ RW (stand pivot LLE); NWB R LE  4   Tub/Shower Transfer [place] Tub or Shower Type: Tub/Shower combination  Tub/Shower Transfer Score: 0  Comments: Did not assess due to safety (NWB RLE) with wound vac intact R foot  0 Not addressed      Comprehension Primary Mode of Comprehension: Auditory  Score: 5     5   Expression Primary Mode of Expression: Verbal  Score: 5  5      Social Interaction Score: 5  5   Problem Solving Score: 5  5   Memory Score: 5  5     FIM SCORES Initial Assessment Weekly Progress Assessment 10/6/2021   Bed/Chair/Wheelchair Transfers Transfer Type: Other  Other: stand pivot with RW; and lateral squat pivot  Transfer Assistance : 4 (Minimal assistance) (with both methods; vc's fo hand placement w/ squat pivot)  Sit to Stand Assistance: Minimal assistance  Car Transfers: Minimum assistance (assist for R LE in/out of car & mgm't of door)  Car Type: car simulator in rehab Transfer Type:  Other  Other: stand step with RW  Transfer Assistance : 5 (Supervision/setup)  Sit to Stand Assistance: Supervision   Bed Mobility Rolling Right 5 (Stand-by assistance)   Rolling Left 5 (Stand-by assistance)   Supine to Sit 5 (Stand-by assistance)   Sit to Stand Minimal assistance   Sit to Supine  (CGA for R LE & mgm't of wound vac tube)    Rolling Right   6 (Modified independent)   Rolling Left   6 (Modified independent)   Supine to Sit   6 (Modified independent)   Sit to Stand   Supervision   Sit to Supine   6 (Modified independent)      Locomotion (W/C) Able to Propel (ft): 75 feet  Functional Level:  (min A)  Curbs/Ramps Assist Required (FIM Score): 0 (Not tested)  Wheelchair Setup Assist Required : 2 (Maximal assistance)  Wheelchair Management: Manages left brake, Manages right brake (with supervision & vc's) Function    Setup Assistance         Locomotion (W/C distance)       Locomotion (Walk) 4 (Contact guard assistance) 5 (Supervision/setup)  Walker, rolling   Locomotion (Walk dist.) 35 Feet (ft) 118 Feet (ft) (x 1 trial, 208 Feet x 1 trial over a variety of surfaces)   Steps/Stairs Steps/Stairs Ambulated (#): 1 (4\" height step)  Level of Assist : 3 (Moderate assistance)  Rail Use: Both  Pt negotiated 4\" curb x 4 repetitions with RW and CGA         Nursing:     Neuro:   AAA&O x   4         Respiratory:   [x] WNL   [] O2 LPM:   Other:  Peripheral Vascular:   [] TEDS present   [] Edema present ____ Grade   Cardiac:   [x] WNL   [] Other  Genitourinary:   [x] continent   [] incontinent   [] dennis  Abdominal _10/4______ LBM  GI: _diabetic______ Diet _thin_____ Liquids _____ tube feeds  Musculoskeletal: ____ ROM Transfers _____ Assistive Device Used  _mod___ Level of Assistance  Skin Integumentary:   [x] Intact   [] Not Intact   __________Preventative Measures  Details_______wound vac to right foot_______________________________________________________  Pain: [x] Controlled   [] Not Controlled   Pain Meds:   [] Scheduled   [] PRN        Registered Dietitian / Nutrition:   No data found. Pt continues to have good appetite/ meal intake. Eating >75% of meals. Tolerating diet. Supplements:          [] Yes   [x] No      Amount of supplement consumed: not applicable       Intake/Output Summary (Last 24 hours) at 10/6/2021 0956  Last data filed at 10/6/2021 0934  Gross per 24 hour   Intake 300 ml   Output    Net 300 ml                                Last bowel movement: 10/5,   10/4      Interdisciplinary Team Goals:     1. Discipline  Physical Therapy    Goal  Encourage pt to demonstrate consistent safe awareness of wound vac line in preparation for safe d/c home. Barrier  Impaired strength and balance, NWB Right LE    Intervention  Education, Mobility training, Strength and Balance training    Goal written by:   Guilherme Earl PT, DPT     2.  Discipline Occupational Therapy    Goal  Pt will continue to increase safety awareness during transfers while managing wound vac cord. Barrier  Decreased strength NWB on RLE    Intervention  Therex, Theract, Transfer Training     Goal written by:  Mitchell Mcwilliams, ELISEO/L      3. Discipline  Speech Therapy    Goal      Barrier      Intervention      Goal written by:       4. Discipline  Nursing    Goal  Pt. Wound will heal properly and skin will remain intact free from breakdown or pressure ulcers. Barrier  decreased mobility, Diabetic infection of right foot    Intervention  Inspect skin/wound routinely, keep clean,dry and due proper skin/wound care as ordered     Goal written by:  Lauren Farmer RN     5. Discipline  Clinical Psychology    Goal  Understand all safety and pace recommendations for discharge    Barrier  Transition from acute rehabilitation to outpatient recovery    Intervention  Patient education    Goal written by:  Susan Bañuelos, PhD     6. Discipline  Nutrition / Dietetics    Goal  - PO nutrition intake will continue to meet >75% of patients estimated nutritional needs over the next 7 days. Barrier  none known     Intervention  continue po diet    Goal written by:  John Hernandez RD       Disposition / Discharge Planning:      Follow-up services:  [x] Physical Therapy             [x] Occupational Therapy       [] Speech Therapy           [] Skilled Nursing      [] Medical Social Worker   [] Aide        [] Outpatient      [] vs   [x] Home Health  [] vs       [] to progress to outpatient       [] with 24-hour supervision       [x] with 24-hour assistance   [] Rivas HOWELL recommendations: W/c and RW; bedside commode   Estimated discharge date:  10/8/2021   Discharge Location:  [x] Home  [] versus    [] Rivas Aguayo    [] [address]   [] Other:           Electronic Signatures:      Signature Date Signed   Physical Therapist   Caitlin Armstrong PT, DPT 10/06/2021   Occupational Therapist    Samira Begum, MSOTR/L   10/7/21   Speech Therapist         Recreational Therapist    Pedrito Mensah, CTRS 10/7/2021   Nursing    Antonette Wilhelm RN 10/6/2021   Dietitian    Van Ruiz RD  10/6/2021   Clinical Psychologist    Cristal Slade, PhD  10/6/2021    Physician    Glenda Salvador MD   10/6/2021        Candido Barnes, VARINDER  10/6/2021     Opportunity to share with family/caregiver[] YES [] NO    Relationship to patient____________________________________________________      The above information has been reviewed with the patient in a language that they can understand. Opportunity for comments and questions has been provided and a signed attestation has been scanned into the \"media tab\" of the EMR.       Patient Signature: ______________________________________________________    Date Signed: __________________________________________________________

## 2021-10-06 NOTE — HOME CARE
Received HH referral for SN for wound care,wound vac management,PT,OT ; spoke to patient's wife Lucia Mitchell); [facility] demographics,[facility] and answered all questions ; patients wife states she is willing to learn about wound care for home health ;  orders for DME: W/C,RW,BSC and TTB being ordered by [facility]  from [place] ; per ,states she's working on obtaining orders for wound vac ; [facility] will follow for pending discharge on Friday 10/8/21. FRED ABEL.

## 2021-10-06 NOTE — ROUTINE PROCESS
0800 PT. Awake sitting up in bed change in assessment. 0930 Pt. Sitting up in chair eating breakfast.  1200 with therapy. 1330 able to transfer from bed to chair with min assist.  1440 wound care team arrived to assess right foot dressing. 1500 no change in assessment. 1800 Pt. Sitting up in chair eating dinner.

## 2021-10-06 NOTE — PROGRESS NOTES
Problem: Mobility Impaired (Adult and Pediatric)  Goal: *Therapy Goal (Edit Goal, Insert Text)  Description: Physical Therapy Short Term Goals  Initiated 9/20/2021, re-assessed 10/04/21, and to be progressed to long term goals. 1.  Patient will move from supine to sit and sit to supine  and roll side to side in bed with modified independence. (met 9/27)   Goal updated: pt will perform bed mobility with independence without side rails on bed. MET 10/04/2021  2. Patient will transfer from bed to chair and chair to bed with Stand-by assistance using the least restrictive device, NWB R LE. (met 9/27, with use of RW)   Goal updated: pt will transfer from bed to chair and chair to bed with Supervision using a RW and NWB R LE.  MET 10/04/2021  3. Patient will perform sit to stand with Stand-by assistance to RW and NWB R LE. (met 9/27/21)   Goals updated: pt will perform sit to stand with supervision to RW and NWB R LE.  MET 10/04/2021  4. (Goal revised) Patient will ambulate with contact guard assist for 50 feet with RW, NWB R LE and hop method. (met 9/27/21)   Goal updated: pt will ambulate 70 ft with RW and SBA on level surfaces, NWB R LE.  MET 10/04/2021  5. Patient will propel w/c 150 ft with supervision on level surfaces using B UE's and L LE. (met 9/27/21)   Goal updated: pt will propel w/c 150 ft with supervision on uneven surfaces outside. Not tested this treatment [facility]    Physical Therapy Long Term Goals  Initiated 9/20/2021 and to be accomplished within 21 day(s) [10/11/21]  1. Patient will transfer from bed to chair and chair to bed with modified independence using the least restrictive device. 2.  Patient will perform sit to stand with modified independence and RW. 3.  Patient will ambulate with supervision for 80 feet with RW, NWB R LE, and hop method. (to manage household distances)  4.   Patient will ascend/descend 4 stairs with one handrail(s) with minimal assistance/contact guard assist. Outcome: Progressing Towards Goal     PHYSICAL THERAPY TREATMENT    Patient: Claus Xavier (56 y.o. male)  Date: 10/6/2021  Diagnosis: Diabetic foot infection ([facility] [E11.628, L08.9] [facility] ([facility]  Precautions: Fall, NWB  Chart, physical therapy assessment, plan of care and goals were reviewed. Time SE:  Time WUB:1423    Patient seen for: Balance activities;Gait training;Patient education;Transfer training; Wheelchair mobility; Therapeutic exercise     Time In: 1000  Time Out: 1030    Patient seen for: Balance activities;Gait training;Patient education;Transfer training; Wheelchair mobility    Pain:  Pt pain was reported as no c/o pain pre-treatment. Pt pain was reported as no c/o pain post-treatment. Intervention: N/A    Patient identified with name and : yes    SUBJECTIVE:      Pt is pleasant and cooperative noting, \"if you don't get up for a while you really notice it;you get stiff. \"  Pt reports he wants to ascend/descend curb 3 times in a row safely. Pt is motivated but at times requires cues for rest breaks. OBJECTIVE DATA SUMMARY:    Objective:     BED/MAT MOBILITY Daily Assessment     Rolling Right : 6 (Modified independent)  Rolling Left : 6 (Modified independent)  Supine to Sit : 6 (Modified independent)  Sit to Supine : 6 (Modified independent)  Pt requires increased time for safety and attention to wound vac line. TRANSFERS Daily Assessment     Transfer Type: Other  Other: stand step with RW  Transfer Assistance : 5 (Supervision/setup)  Sit to Stand Assistance: Supervision  Pt requires supervision for safety with verbal cues for problem solving safety with managing wound vac line in novel scenarios. Pt also requires verbal reminders for safe hand placement when distracted.         GAIT Daily Assessment    Gait Description (WDL) Exceptions to WDL    Gait Abnormalities Decreased step clearance    Assistive device Walker, rolling    Ambulation assistance - level surface 5 (Supervision/setup)    Distance 118 Feet (ft) (x 1 trial, 208 Feet x 1 trial over a variety of surfaces)    Ambulation assistance- uneven surface  Supervision    Comments Pt ambulated over level tile surfaces and outdoors over concrete sidewalk of varying grades with supervision for safety. Pt requires minimal verbal cues for safe RW management and slower pace with turning with ambulation over concrete sidewalk. STEPS/STAIRS Daily Assessment     Steps/Stairs ambulated NT    Assistance Required  (see curb training)    Rail Use  NT    Comments   Pt ascended/descended 4\" curb for one trial x 4 repetitions and one trial of 3 repetitions requiring CGA for safety with pt demonstrating 2 minor LOB one with ascending and one with descending curb step. Curbs/Ramps Contact guard assistance        BALANCE Daily Assessment     Sitting - Static: Good (unsupported)  Sitting - Dynamic: Good (unsupported)  Standing - Static: Fair  Standing - Dynamic : Impaired        WHEELCHAIR MOBILITY Daily Assessment     Pt propels w/c on unit with modified independence over level surfaces. THERAPEUTIC EXERCISES Daily Assessment     Supine LE Strength Training:  3 Sets of 10 Repetitions:  Right and Left SLR Hip Flexion AROM  Bridging with B knees on bolster to maintain right LE NWB and strengthen posterior chain  Sidelying LE Strength Training:  Right and Left Clamshells  Pt requires minimal verbal cues for attention to quality of movement. ASSESSMENT:  Pt is progressing well ambulating over concrete sidewalk this treatment session and demonstrating improved quality of movement with exercises requiring decreased verbal cuing.   Progression toward goals:  []      Improving appropriately and progressing toward goals  [x]      Improving slowly and progressing toward goals  []      Not making progress toward goals and plan of care will be adjusted      PLAN:  Patient continues to benefit from skilled intervention to address the above impairments. Continue treatment per established plan of care. Emphasize functional strengthening with repetition of movement patterns for carryover. Discharge Recommendations:  Home Health Physical Therapy with 24 hour assistance  Further Equipment Recommendations for Discharge:  rolling walker and wheelchair 18 inch with removable and elevating leg rests, anti-tippers, and removable arm rests      Estimated Discharge Date:10/08/2021    Activity Tolerance:   Good  Please refer to the flowsheet for vital signs taken during this treatment.     After treatment:   [] Patient left in no apparent distress in bed  [x] Patient left in no apparent distress sitting up in chair  [] Patient left in no apparent distress in w/c mobilizing under own power  [] Patient left in no apparent distress dining area  [] Patient left in no apparent distress mobilizing under own power  [x] Call bell left within reach  [] Nursing notified  [] Caregiver present  [] Bed alarm activated   [x] Chair alarm activated      Lam Restrepo, PT, DPT  10/6/2021

## 2021-10-06 NOTE — ROUTINE PROCESS
SHIFT CHANGE NOTE FOR [facility]    Bedside and Verbal shift change report given to Kurt Medina, RN (oncoming nurse) by Mann Coelho, RN (offgoing nurse). Report included the following information SBAR, Kardex, MAR and Recent Results. Situation:   Code Status: Full Code   Hospital Day: 18   Problem List:   Hospital Problems  Date Reviewed: 10/6/2021        Codes Class Noted POA    Essential hypertension (Chronic) ICD-10-CM: I10  ICD-9-CM: 401.9  Unknown Yes        Type 2 diabetes mellitus without complication, with long-term current use of insulin (HCC) (Chronic) ICD-10-CM: E11.9, Z79.4  ICD-9-CM: 250.00, V58.67  Unknown Yes    Overview Signed 9/20/2021 11:13 PM by Ad Galo MD     HbA1c (9/3/2021) = 10.8             Hyperthyroidism (Chronic) ICD-10-CM: E05.90  ICD-9-CM: 242.90  Unknown Yes        Vitamin D insufficiency (Chronic) ICD-10-CM: E55.9  ICD-9-CM: 268.9  9/19/2021 Yes    Overview Signed 9/20/2021 11:11 PM by Ad Galo MD     Vitamin D 25-Hydroxy (9/19/2021) = 24.0             Status post incision and drainage ICD-10-CM: Z98.890  ICD-9-CM: V45.89  9/9/2021 Yes    Overview Signed 9/20/2021 11:18 PM by Ad Galo MD     S/P Incision and drainage, with decompression of multiple subfascial layers, right foot; Wound debridement, skin, soft tissue, and muscle, right foot (9/9/2021 - Dr. Chantell Ramey)             AFANR-94 ruled out by laboratory testing ICD-10-CM: Z20.822  ICD-9-CM: V01.79  9/8/2021 Yes    Overview Signed 9/20/2021 11:14 PM by Ad Galo MD     SARS-CoV-2 ([address] [place] [address]) (9/8/2021):  Not detected             History of incision and drainage ICD-10-CM: Z98.890  ICD-9-CM: V45.89  9/4/2021 Yes    Overview Signed 9/20/2021 11:18 PM by Ad Galo MD     S/P Incision and drainage of right foot (9/4/2021 - Dr. Ivon Aguilar.)             * (Principal) Diabetic infection of right foot [facility]) ICD-10-CM: E11.628, L08.9  ICD-9-CM: 250.80, 686.9  9/2/2021 Yes Impaired mobility and ADLs ICD-10-CM: Z74.09, Z78.9  ICD-9-CM: V49.89  9/2/2021 Yes              Background:   Past Medical History:   Past Medical History:   Diagnosis Date    COVID-19 ruled out by laboratory testing 9/8/2021    SARS-CoV-2 ([facility]) (9/8/2021):  Not detected    Diabetic infection of right foot ([address] 9/2/2021    Essential hypertension     Hyperthyroidism     Type 2 diabetes mellitus without complication, with long-term current use of insulin ([facility])     HbA1c (9/3/2021) = 10.8    Vitamin D insufficiency 9/19/2021    Vitamin D 25-Hydroxy (9/19/2021) = 24.0        Assessment:   Changes in Assessment throughout shift: No change to previous assessment     Patient has a central line: no Reasons if yes: na  Insertion date:na Last dressing date:na   Patient has Dennis Cath: no Reasons if yes: na   Insertion date:na  Shift dennis care completed: NO     Last Vitals:     Vitals:    10/05/21 1558 10/05/21 2052 10/06/21 0745 10/06/21 1615   BP: 132/80 125/68 133/78 (!) 142/80   Pulse: 83 74 86 77   Resp: 19 18 19 19   Temp: 98.1 °F (36.7 °C) 98 °F (36.7 °C) 97.7 °F (36.5 °C) 98 °F (36.7 °C)   SpO2: 99% 100% 100% 100%   Weight:       Height:            PAIN    Pain Assessment    Pain Intensity 1: 0 (10/06/21 1615) Pain Intensity 1: 2 (12/29/14 1105)    Pain Location 1: Leg Pain Location 1: Abdomen    Pain Intervention(s) 1: Medication (see MAR) Pain Intervention(s) 1: Medication (see MAR)  Patient Stated Pain Goal: 0 Patient Stated Pain Goal: 0  o Intervention effective: yes  o Other actions taken for pain: Medication (see MAR)     Skin Assessment  Skin color Skin Color: Appropriate for ethnicity  Condition/Temperature Skin Condition/Temp: Dry, Warm  Integrity Skin Integrity: Wound (add Wound LDA)  Turgor Turgor: Non-tenting  Weekly Pressure Ulcer Documentation  Pressure  Injury Documentation: No Pressure Injury Noted-Pressure Ulcer Prevention Initiated  Wound Prevention & Protection Methods  Orientation of wound Orientation of Wound Prevention: Posterior  Location of Prevention Location of Wound Prevention: Buttocks  Dressing Present Dressing Present : No  Dressing Status    Wound Offloading Wound Offloading (Prevention Methods): Bed, pressure redistribution/air     INTAKE/OUPUT  Date 10/05/21 1900 - 10/06/21 0659 10/06/21 0700 - 10/07/21 0659   Shift 9056-0140 24 Hour Total 8757-0362 4784-3274 24 Hour Total   INTAKE   P.O.   1380  1380     P. O.   1380  1380   Shift Total(mL/kg)   7603(85.0)  2402(55.5)   OUTPUT   Urine(mL/kg/hr)          Urine Occurrence(s) 1 x 2 x 4 x  4 x   Stool          Stool Occurrence(s) 0 x 1 x 1 x  1 x   Shift Total(mL/kg)        NET   1380  1380   Weight (kg) 96 96 96 96 96       Recommendations:  1. Patient needs and requests: na    2. Pending tests/procedures: na     3. Functional Level/Equipment:   / Wheelchair    Fall Precautions:   Fall risk precautions were reinforced with the patient; he was instructed to call for help prior to getting up. The following fall risk precautions were continued: bed/ chair alarms, door signage, yellow bracelet and socks as well as update of the Ilana Starch tool in the patient's room. Willy Score: 3    HEALS Safety Check    A safety check occurred in the patient's room between off going nurse and oncoming nurse listed above.     The safety check included the below items  Area Items   H  High Alert Medications - Verify all high alert medication drips (heparin, PCA, etc.)   E  Equipment - Suction is set up for ALL patients (with yanker)  - Red plugs utilized for all equipment (IV pumps, etc.)  - WOWs wiped down at end of shift.  - Room stocked with oxygen, suction, and other unit-specific supplies   A  Alarms - Bed alarm is set for fall risk patients  - Ensure chair alarm is in place and activated if patient is up in a chair   L  Lines - Check IV for any infiltration  - Bal bag is empty if patient has a Bal   - Tubing and IV bags are labeled   S  Safety   - Room is clean, patient is clean, and equipment is clean. - Hallways are clear from equipment besides carts. - Fall bracelet on for fall risk patients  - Ensure room is clear and free of clutter  - Suction is set up for ALL patients (with darwinker)  - Hallways are clear from equipment besides carts.    - Isolation precautions followed, supplies available outside room, sign posted     Olena Mahmood RN

## 2021-10-06 NOTE — ROUTINE PROCESS
SHIFT CHANGE NOTE FOR [facility]    Bedside and Verbal shift change report given to Kathleen Blunt, RN (oncoming nurse) by Marty Bowden RN (offgoing nurse). Report included the following information SBAR, Kardex, MAR and Recent Results. Situation:   Code Status: Full Code   Hospital Day: 18   Problem List:   Hospital Problems  Date Reviewed: 10/5/2021        Codes Class Noted POA    Essential hypertension (Chronic) ICD-10-CM: I10  ICD-9-CM: 401.9  Unknown Yes        Type 2 diabetes mellitus without complication, with long-term current use of insulin (HCC) (Chronic) ICD-10-CM: E11.9, Z79.4  ICD-9-CM: 250.00, V58.67  Unknown Yes    Overview Signed 9/20/2021 11:13 PM by Claudia Lombardo MD     HbA1c (9/3/2021) = 10.8             Hyperthyroidism (Chronic) ICD-10-CM: E05.90  ICD-9-CM: 242.90  Unknown Yes        Vitamin D insufficiency (Chronic) ICD-10-CM: E55.9  ICD-9-CM: 268.9  9/19/2021 Yes    Overview Signed 9/20/2021 11:11 PM by Claudia Lombardo MD     Vitamin D 25-Hydroxy (9/19/2021) = 24.0             Status post incision and drainage ICD-10-CM: Z98.890  ICD-9-CM: V45.89  9/9/2021 Yes    Overview Signed 9/20/2021 11:18 PM by Claudia Lombardo MD     S/P Incision and drainage, with decompression of multiple subfascial layers, right foot; Wound debridement, skin, soft tissue, and muscle, right foot (9/9/2021 - Dr. Laurie Ramos)             [id number] ruled out by laboratory testing ICD-10-CM: Z20.822  ICD-9-CM: V01.79  9/8/2021 Yes    Overview Signed 9/20/2021 11:14 PM by Claudia Lombardo MD     SARS-CoV-2 ([address] [place] [address]) (9/8/2021):  Not detected             History of incision and drainage ICD-10-CM: Z98.890  ICD-9-CM: V45.89  9/4/2021 Yes    Overview Signed 9/20/2021 11:18 PM by Claudia Lombardo MD     S/P Incision and drainage of right foot (9/4/2021 - Dr. Rosalba Cee.)             * (Principal) Diabetic infection of right foot [facility]) ICD-10-CM: E11.628, L08.9  ICD-9-CM: 250.80, 686.9  9/2/2021 Yes Impaired mobility and ADLs ICD-10-CM: Z74.09, Z78.9  ICD-9-CM: V49.89  9/2/2021 Yes              Background:   Past Medical History:   Past Medical History:   Diagnosis Date    COVID-19 ruled out by laboratory testing 9/8/2021    SARS-CoV-2 ([place]) (9/8/2021):  Not detected    Diabetic infection of right foot ([address] 9/2/2021    Essential hypertension     Hyperthyroidism     Type 2 diabetes mellitus without complication, with long-term current use of insulin ([facility])     HbA1c (9/3/2021) = 10.8    Vitamin D insufficiency 9/19/2021    Vitamin D 25-Hydroxy (9/19/2021) = 24.0        Assessment:   Changes in Assessment throughout shift: No change to previous assessment    Patient has a central line: no   Patient has Bal Cath: no      Last Vitals:     Vitals:    10/04/21 2055 10/05/21 0742 10/05/21 1558 10/05/21 2052   BP: 134/71 120/71 132/80 125/68   Pulse: 81 80 83 74   Resp: 18 18 19 18   Temp: 97.4 °F (36.3 °C) 96.9 °F (36.1 °C) 98.1 °F (36.7 °C) 98 °F (36.7 °C)   SpO2: 99% 99% 99% 100%   Weight:       Height:            PAIN    Pain Assessment    Pain Intensity 1: 0 (10/06/21 0400) Pain Intensity 1: 2 (12/29/14 1105)    Pain Location 1: Foot Pain Location 1: Abdomen    Pain Intervention(s) 1:  (premedicated for dressing change) Pain Intervention(s) 1: Medication (see MAR)  Patient Stated Pain Goal: 0 Patient Stated Pain Goal: 0  o Intervention effective: yes  o Other actions taken for pain:       Skin Assessment  Skin color Skin Color: Appropriate for ethnicity  Condition/Temperature Skin Condition/Temp: Dry, Warm  Integrity Skin Integrity: Wound (add Wound LDA)  Turgor Turgor: Non-tenting  Weekly Pressure Ulcer Documentation  Pressure  Injury Documentation: No Pressure Injury Noted-Pressure Ulcer Prevention Initiated  Wound Prevention & Protection Methods  Orientation of wound Orientation of Wound Prevention: Posterior  Location of Prevention Location of Wound Prevention: Buttocks  Dressing Present Dressing Present : No  Dressing Status    Wound Offloading Wound Offloading (Prevention Methods): Bed, pressure redistribution/air     INTAKE/OUPUT  Date 10/05/21 0700 - 10/06/21 0659 10/06/21 0700 - 10/07/21 0659   Shift 1961-1666 6559-9336 24 Hour Total 0700-1859 1900-0659 24 Hour Total   INTAKE   Shift Total(mL/kg)         OUTPUT   Urine(mL/kg/hr)           Urine Occurrence(s) 1 x  1 x      Stool           Stool Occurrence(s) 1 x  1 x      Shift Total(mL/kg)         NET         Weight (kg) 96 96 96 96 96 96       Recommendations:  Patient needs and requests: Assist with toileting  1. Pending tests/procedures: none     2. Functional Level/Equipment:   / Bed (comment); Wheelchair    Fall Precautions:   Fall risk precautions were reinforced with the patient; he was instructed to call for help prior to getting up. The following fall risk precautions were continued: bed/ chair alarms, door signage, yellow bracelet and socks as well as update of the Vannesa Blood tool in the patient's room. Willy Score: 3    HEALS Safety Check    A safety check occurred in the patient's room between off going nurse and oncoming nurse listed above. The safety check included the below items  Area Items   H  High Alert Medications - Verify all high alert medication drips (heparin, PCA, etc.)   E  Equipment - Suction is set up for ALL patients (with yanker)  - Red plugs utilized for all equipment (IV pumps, etc.)  - WOWs wiped down at end of shift.  - Room stocked with oxygen, suction, and other unit-specific supplies   A  Alarms - Bed alarm is set for fall risk patients  - Ensure chair alarm is in place and activated if patient is up in a chair   L  Lines - Check IV for any infiltration  - Bal bag is empty if patient has a Bal   - Tubing and IV bags are labeled   S  Safety   - Room is clean, patient is clean, and equipment is clean. - Hallways are clear from equipment besides carts.    - Fall bracelet on for fall risk patients  - Ensure room is clear and free of clutter  - Suction is set up for ALL patients (with roman)  - Hallways are clear from equipment besides carts.    - Isolation precautions followed, supplies available outside room, sign posted     Gregory Beatty RN

## 2021-10-07 LAB
GLUCOSE BLD STRIP.AUTO-MCNC: 107 MG/DL (ref 70–110)
GLUCOSE BLD STRIP.AUTO-MCNC: 113 MG/DL (ref 70–110)
GLUCOSE BLD STRIP.AUTO-MCNC: 140 MG/DL (ref 70–110)
GLUCOSE BLD STRIP.AUTO-MCNC: 279 MG/DL (ref 70–110)
GLUCOSE BLD STRIP.AUTO-MCNC: 69 MG/DL (ref 70–110)
GLUCOSE BLD STRIP.AUTO-MCNC: 69 MG/DL (ref 70–110)
GLUCOSE BLD STRIP.AUTO-MCNC: 73 MG/DL (ref 70–110)
HCT VFR BLD AUTO: 33.3 % (ref 36–48)
HGB BLD-MCNC: 10.8 G/DL (ref 13–16)
PLATELET # BLD AUTO: 359 K/UL (ref 135–420)

## 2021-10-07 PROCEDURE — 74011250637 HC RX REV CODE- 250/637: Performed by: FAMILY MEDICINE

## 2021-10-07 PROCEDURE — 85018 HEMOGLOBIN: CPT

## 2021-10-07 PROCEDURE — 97530 THERAPEUTIC ACTIVITIES: CPT

## 2021-10-07 PROCEDURE — 85049 AUTOMATED PLATELET COUNT: CPT

## 2021-10-07 PROCEDURE — 36415 COLL VENOUS BLD VENIPUNCTURE: CPT

## 2021-10-07 PROCEDURE — 99232 SBSQ HOSP IP/OBS MODERATE 35: CPT | Performed by: INTERNAL MEDICINE

## 2021-10-07 PROCEDURE — 82962 GLUCOSE BLOOD TEST: CPT

## 2021-10-07 PROCEDURE — 97535 SELF CARE MNGMENT TRAINING: CPT

## 2021-10-07 PROCEDURE — 74011250637 HC RX REV CODE- 250/637: Performed by: EMERGENCY MEDICINE

## 2021-10-07 PROCEDURE — 65310000000 HC RM PRIVATE REHAB

## 2021-10-07 PROCEDURE — 97110 THERAPEUTIC EXERCISES: CPT

## 2021-10-07 PROCEDURE — 74011250637 HC RX REV CODE- 250/637: Performed by: INTERNAL MEDICINE

## 2021-10-07 PROCEDURE — 74011636637 HC RX REV CODE- 636/637: Performed by: INTERNAL MEDICINE

## 2021-10-07 PROCEDURE — 74011250636 HC RX REV CODE- 250/636: Performed by: INTERNAL MEDICINE

## 2021-10-07 PROCEDURE — 97116 GAIT TRAINING THERAPY: CPT

## 2021-10-07 RX ORDER — INSULIN GLARGINE 100 [IU]/ML
10 INJECTION, SOLUTION SUBCUTANEOUS
Status: DISCONTINUED | OUTPATIENT
Start: 2021-10-07 | End: 2021-10-08 | Stop reason: HOSPADM

## 2021-10-07 RX ORDER — METOPROLOL TARTRATE 50 MG/1
50 TABLET ORAL EVERY 12 HOURS
Qty: 60 TABLET | Refills: 0 | Status: SHIPPED | OUTPATIENT
Start: 2021-10-08

## 2021-10-07 RX ORDER — LISINOPRIL 40 MG/1
40 TABLET ORAL DAILY
Qty: 30 TABLET | Refills: 0 | Status: SHIPPED | OUTPATIENT
Start: 2021-10-07

## 2021-10-07 RX ORDER — ACETAMINOPHEN 500 MG
2000 TABLET ORAL DAILY
Qty: 30 CAPSULE | Refills: 0 | Status: SHIPPED | OUTPATIENT
Start: 2021-10-07

## 2021-10-07 RX ORDER — LISINOPRIL 20 MG/1
40 TABLET ORAL DAILY
Status: DISCONTINUED | OUTPATIENT
Start: 2021-10-08 | End: 2021-10-08 | Stop reason: HOSPADM

## 2021-10-07 RX ORDER — OXYCODONE AND ACETAMINOPHEN 5; 325 MG/1; MG/1
1 TABLET ORAL
Qty: 20 TABLET | Refills: 0 | Status: SHIPPED | OUTPATIENT
Start: 2021-10-07 | End: 2021-10-12

## 2021-10-07 RX ORDER — ACETAMINOPHEN 325 MG/1
650 TABLET ORAL
Qty: 30 TABLET | Refills: 0 | Status: SHIPPED | OUTPATIENT
Start: 2021-10-07

## 2021-10-07 RX ADMIN — METOPROLOL TARTRATE 50 MG: 50 TABLET, FILM COATED ORAL at 08:03

## 2021-10-07 RX ADMIN — INSULIN LISPRO 3 UNITS: 100 INJECTION, SOLUTION INTRAVENOUS; SUBCUTANEOUS at 12:31

## 2021-10-07 RX ADMIN — INSULIN GLARGINE 10 UNITS: 100 INJECTION, SOLUTION SUBCUTANEOUS at 08:04

## 2021-10-07 RX ADMIN — METOPROLOL TARTRATE 50 MG: 50 TABLET, FILM COATED ORAL at 20:44

## 2021-10-07 RX ADMIN — INSULIN GLARGINE 10 UNITS: 100 INJECTION, SOLUTION SUBCUTANEOUS at 20:44

## 2021-10-07 RX ADMIN — LISINOPRIL 30 MG: 20 TABLET ORAL at 08:03

## 2021-10-07 RX ADMIN — INSULIN LISPRO 3 UNITS: 100 INJECTION, SOLUTION INTRAVENOUS; SUBCUTANEOUS at 08:05

## 2021-10-07 RX ADMIN — METHIMAZOLE 10 MG: 10 TABLET ORAL at 08:03

## 2021-10-07 RX ADMIN — Medication 5000 UNITS: at 08:02

## 2021-10-07 RX ADMIN — FAMOTIDINE 20 MG: 20 TABLET, FILM COATED ORAL at 08:03

## 2021-10-07 RX ADMIN — FAMOTIDINE 20 MG: 20 TABLET, FILM COATED ORAL at 17:00

## 2021-10-07 RX ADMIN — HEPARIN SODIUM 5000 UNITS: 5000 INJECTION INTRAVENOUS; SUBCUTANEOUS at 08:04

## 2021-10-07 RX ADMIN — HEPARIN SODIUM 5000 UNITS: 5000 INJECTION INTRAVENOUS; SUBCUTANEOUS at 17:01

## 2021-10-07 RX ADMIN — POLYETHYLENE GLYCOL 3350 17 G: 17 POWDER, FOR SOLUTION ORAL at 08:02

## 2021-10-07 NOTE — REHAB NOTE
Had b.s. of 69  Treated with juice per protocol repeat x 2 were 69 and 73.had dinner repeat b.s. was 140.

## 2021-10-07 NOTE — PROGRESS NOTES
Problem: Mobility Impaired (Adult and Pediatric)  Goal: *Therapy Goal (Edit Goal, Insert Text)  Description: Physical Therapy Short Term Goals  Initiated 9/20/2021, re-assessed 10/04/21, and to be progressed to long term goals. 1.  Patient will move from supine to sit and sit to supine  and roll side to side in bed with modified independence. (met 9/27)   Goal updated: pt will perform bed mobility with independence without side rails on bed. MET 10/04/2021  2. Patient will transfer from bed to chair and chair to bed with Stand-by assistance using the least restrictive device, NWB R LE. (met 9/27, with use of RW)   Goal updated: pt will transfer from bed to chair and chair to bed with Supervision using a RW and NWB R LE.  MET 10/04/2021  3. Patient will perform sit to stand with Stand-by assistance to RW and NWB R LE. (met 9/27/21)   Goals updated: pt will perform sit to stand with supervision to RW and NWB R LE.  MET 10/04/2021  4. (Goal revised) Patient will ambulate with contact guard assist for 50 feet with RW, NWB R LE and hop method. (met 9/27/21)   Goal updated: pt will ambulate 70 ft with RW and SBA on level surfaces, NWB R LE.  MET 10/04/2021  5. Patient will propel w/c 150 ft with supervision on level surfaces using B UE's and L LE. (met 9/27/21)   Goal updated: pt will propel w/c 150 ft with supervision on uneven surfaces outside. MET 10/07/2021    Physical Therapy Long Term Goals  Initiated 9/20/2021 and to be accomplished within 21 day(s) [10/11/21]  1. Patient will transfer from bed to chair and chair to bed with modified independence using the least restrictive device. MET 10/07/2021  2. Patient will perform sit to stand with modified independence and RW. MET 10/07/2021  3. Patient will ambulate with supervision for 80 feet with RW, NWB R LE, and hop method. (to manage household distances) MET 10/07/2021  4.   Patient will ascend/descend 4 stairs with one handrail(s) with minimal assistance/contact guard assist. Not Met - pt is having a ramp installed at home for safety. Outcome: Resolved/Met    PHYSICAL THERAPY DISCHARGE NOTE    Patient: Shadia Rodgers (85 y.o. male)  Date: 10/7/2021  Diagnosis: Diabetic foot infection ([facility] [E11.628, L08.9] Diabetic infection of right foot ([facility]  Precautions: Fall, NWB  Chart, physical therapy assessment, plan of care and goals were reviewed. Time in:1035  Time out:1235    Patient seen for: Balance activities;Gait training;Patient education;Transfer training; Wheelchair mobility    Pain:  Pt pain was reported as no c/o pain pre-treatment. Pt pain was reported as no c/o pain post-treatment. Intervention: N/A    Patient identified with name and : yes    SUBJECTIVE:     Patient is pleasant and cooperative throughout treatment session but at times appears frustrated with curb negotiation stating, \"I want it to be smooth. \"  Pt maximally educated re: body mechanics and technique and attempted to encourage pt that curb negotiation is challenging and he will have opportunity to work with home health therapy for more functional strengthening in his home environment. OBJECTIVE DATA SUMMARY:     GROSS ASSESSMENT Discharge Assessment 10/7/2021   AROM Within functional limits   Strength Within functional limits   Coordination Within functional limits   Tone Normal   Sensation Intact   PROM Within Functional Limits       POSTURE Discharge Assessment 10/7/2021   Posture (WDL) Exceptions to WDL   Posture Assessment Rounded shoulder posture with fatigue with B UE weightbearing on RW. BALANCE Discharge Assessment 10/7/2021    Sitting - Static: Good (unsupported)  Sitting - Dynamic: Good (unsupported)  Standing - Static: Fair  Standing - Dynamic : Impaired       BED/CHAIR/WHEELCHAIR TRANSFERS Initial Assessment Discharge Assessment   Rolling Right 5 (Stand-by assistance) 6 (Modified independent) with increased time to attend to wound vac line.    Rolling Left 5 (Stand-by assistance) 6 (Modified independent) with increased time to attend to wound vac line. Supine to Sit 5 (Stand-by assistance) 6 (Modified independent) with increased time to attend to wound vac line. Sit to Stand Minimal assistance Modified independent to/from RW with minimal safety concerns due to right LE NWB restrictions   Sit to Supine  (CGA for R LE & mgm't of wound vac tube) 6 (Modified independent)   with increased time to attend to wound vac line. Transfer Assistance Level 4 (Minimal assistance) (with both methods; vc's fo hand placement w/ squat pivot) 6 (Modified independent)   Pt performs both stand step with RW transfers and squat pivot transfers at a modified independent level with some safety concern due to wound vac but did not require verbal cues for technique this treatment session. Transfer Type Other Other   Comments     Pt performs stand step transfers with RW with modified independence. Car Transfer Minimum assistance (assist for R LE in/out of car & mgm't of door) Modified Tangent   Car Type car simulator in rehab  car transfer        [facility] MOBILITY/MANAGEMENT Initial Assessment Discharge Assessment   Able to Propel 75 feet Distance not challenged but pt has demonstrated ability to propel w/c with B UEs throughout unit with modified independence. Assistance Level  (min A) Modified independence.    Curbs/ramps assistance required 0 (Not tested) NT   Wheelchair set up assistance required 2 (Maximal assistance) 4 (Minimal assistance)   Wheelchair management Manages left brake, Manages right brake (with supervision & vc's) Manages left brake;Manages right brake;Manages right footrest       WALKING INDEPENDENCE Initial Assessment Discharge Assessment   Assistive device Walker, rolling, Gait belt Walker, rolling;Gait belt   Ambulation assistance - level surface 4 (Contact guard assistance) 5 (Supervision/setup)   Distance 35 Feet (ft) 150 Feet (ft) Comments   Pt ambulates with increased forward flexed posture as he fatigues requiring verbal and tactile cues for scap add and depression. Ambulation assistance - unlevel surface    NT       GAIT Discharge Assessment 10/7/2021   Gait Description (WDL) Exceptions to WDL   Gait Abnormalities         STEPS/STAIRS Initial Assessment Discharge Assessment   Steps/Stairs ambulated 3 (Moderate assistance)    Rail Use Both    Assistance Level 1 N/A   Comments 1 step on evaluation    Curbs/Ramps Minimum assistance (with RW) Contact guard assistance for balance and safety negotiating 4\" curb as pt moves RW quickly and impulsively demonstrating minimal LOB with ascending and descending curb requiring minimal assistance for steadying. Therapeutic Exercises:   Supine LE Strength Training:  3 Sets of 10 Repetitions:  Right and Left SLR Hip Flexion with verbal and tactile cuing for quad engagement  Sidelying LE strength Training  Right and Left Clamshells with minimal verbal cues for technique. ASSESSMENT:  Pt has progressed well to achieve 3/4 long term goals with the exception of stair negotiation goal.  Pt had decided he would be having a ramp installed at home for safety and has progressed to negotiate a 4\" curb similar to the threshold into his home with CGA in preparation for safe d/c. LTGs: 3/4 achieved     PLAN:  Pt would benefit from continued skilled physical therapy in order to improve independent functional mobility at home health PT level. Interventions may include range of motion (AROM, PROM B LE/trunk), motor function (B LE/trunk strengthening/coordination), activity tolerance (vitals, oxygen saturation levels), bed mobility training, balance activities, gait training (progressive ambulation program), and functional transfer training.      Discharge Recommendations:  Home Health Physical Therapy with 24 hour supervision  Further Equipment Recommendations for Discharge:  wheelchair 18 inch and RW Activity Tolerance:   Good  Please refer to the flowsheet for vital signs taken during this treatment.   After treatment:   [] Patient left in no apparent distress in bed  [] Patient left in no apparent distress sitting up in chair  [x] Patient left in no apparent distress in w/c mobilizing under own power  [] Patient left in no apparent distress dining area  [] Patient left in no apparent distress mobilizing under own power  [] Call bell left within reach  [] Nursing notified  [] Caregiver present  [] Bed alarm activated   [x] Chair alarm activated      Caitlin Armstrong, PT, DPT  10/7/2021

## 2021-10-07 NOTE — ROUTINE PROCESS
SHIFT CHANGE NOTE FOR [facility]    Bedside and Verbal shift change report given to Ashtyn Paris RN (oncoming nurse) by Alfonso Celeste RN (offgoing nurse). Report included the following information SBAR, Kardex, MAR and Recent Results. Situation:   Code Status: Full Code   Hospital Day: 19   Problem List:   Hospital Problems  Date Reviewed: 10/6/2021        Codes Class Noted POA    Essential hypertension (Chronic) ICD-10-CM: I10  ICD-9-CM: 401.9  Unknown Yes        Type 2 diabetes mellitus without complication, with long-term current use of insulin (HCC) (Chronic) ICD-10-CM: E11.9, Z79.4  ICD-9-CM: 250.00, V58.67  Unknown Yes    Overview Signed 9/20/2021 11:13 PM by Sienna oTrres MD     HbA1c (9/3/2021) = 10.8             Hyperthyroidism (Chronic) ICD-10-CM: E05.90  ICD-9-CM: 242.90  Unknown Yes        Vitamin D insufficiency (Chronic) ICD-10-CM: E55.9  ICD-9-CM: 268.9  9/19/2021 Yes    Overview Signed 9/20/2021 11:11 PM by Sienna Torres MD     Vitamin D 25-Hydroxy (9/19/2021) = 24.0             Status post incision and drainage ICD-10-CM: Z98.890  ICD-9-CM: V45.89  9/9/2021 Yes    Overview Signed 9/20/2021 11:18 PM by Sienna Torres MD     S/P Incision and drainage, with decompression of multiple subfascial layers, right foot; Wound debridement, skin, soft tissue, and muscle, right foot (9/9/2021 - Dr. Jose Baez)             MIAHI-96 ruled out by laboratory testing ICD-10-CM: Z20.822  ICD-9-CM: V01.79  9/8/2021 Yes    Overview Signed 9/20/2021 11:14 PM by Sienna Torres MD     SARS-CoV-2 ([address] [place] [address]) (9/8/2021):  Not detected             History of incision and drainage ICD-10-CM: Z98.890  ICD-9-CM: V45.89  9/4/2021 Yes    Overview Signed 9/20/2021 11:18 PM by Sienna Torres MD     S/P Incision and drainage of right foot (9/4/2021 - Dr. Danie Dominguez)             * (Principal) Diabetic infection of right foot [facility]) ICD-10-CM: E11.628, L08.9  ICD-9-CM: 250.80, 686.9  9/2/2021 Yes Impaired mobility and ADLs ICD-10-CM: Z74.09, Z78.9  ICD-9-CM: V49.89  9/2/2021 Yes              Background:   Past Medical History:   Past Medical History:   Diagnosis Date    COVID-19 ruled out by laboratory testing 9/8/2021    SARS-CoV-2 ([facility]) (9/8/2021):  Not detected    Diabetic infection of right foot ([address] 9/2/2021    Essential hypertension     Hyperthyroidism     Type 2 diabetes mellitus without complication, with long-term current use of insulin ([facility])     HbA1c (9/3/2021) = 10.8    Vitamin D insufficiency 9/19/2021    Vitamin D 25-Hydroxy (9/19/2021) = 24.0        Assessment:   Changes in Assessment throughout shift: No change to previous assessment     Patient has a central line: no Reasons if yes: na  Insertion date:na Last dressing date:na   Patient has Dennis Cath: no Reasons if yes: na   Insertion date:na  Shift dennis care completed: NO     Last Vitals:     Vitals:    10/05/21 2052 10/06/21 0745 10/06/21 1615 10/06/21 1954   BP: 125/68 133/78 (!) 142/80 120/72   Pulse: 74 86 77 77   Resp: 18 19 19 19   Temp: 98 °F (36.7 °C) 97.7 °F (36.5 °C) 98 °F (36.7 °C) 97.4 °F (36.3 °C)   SpO2: 100% 100% 100% 100%   Weight:       Height:            PAIN    Pain Assessment    Pain Intensity 1: 0 (10/07/21 0431) Pain Intensity 1: 2 (12/29/14 1105)    Pain Location 1: Leg Pain Location 1: Abdomen    Pain Intervention(s) 1: Medication (see MAR) Pain Intervention(s) 1: Medication (see MAR)  Patient Stated Pain Goal: 0 Patient Stated Pain Goal: 0  o Intervention effective: yes  o Other actions taken for pain:       Skin Assessment  Skin color Skin Color: Appropriate for ethnicity  Condition/Temperature Skin Condition/Temp: Dry, Warm  Integrity Skin Integrity: Wound (add Wound LDA)  Turgor Turgor: Non-tenting  Weekly Pressure Ulcer Documentation  Pressure  Injury Documentation: No Pressure Injury Noted-Pressure Ulcer Prevention Initiated  Wound Prevention & Protection Methods  Orientation of wound Orientation of Wound Prevention: Posterior  Location of Prevention Location of Wound Prevention: Buttocks, Sacrum/Coccyx  Dressing Present Dressing Present : No  Dressing Status    Wound Offloading Wound Offloading (Prevention Methods): Bed, pressure redistribution/air     INTAKE/OUPUT  Date 10/06/21 0700 - 10/07/21 0659 10/07/21 0700 - 10/08/21 0659   Shift 0486-3429 3665-3461 24 Hour Total 5593-4425 6571-5302 24 Hour Total   INTAKE   P.O. 1380  1380        P. O. 1380  1380      Shift Total(mL/kg) 6269(14.2)  0938(61.6)      OUTPUT   Urine(mL/kg/hr)           Urine Occurrence(s) 4 x 3 x 7 x      Stool           Stool Occurrence(s) 1 x 1 x 2 x      Shift Total(mL/kg)         NET 1380  1380      Weight (kg) 96 96 96 96 96 96       Recommendations:  1. Patient needs and requests:assist with toileting    2. Pending tests/procedures: na     3. Functional Level/Equipment:   / Wheelchair    Fall Precautions:   Fall risk precautions were reinforced with the patient; he was instructed to call for help prior to getting up. The following fall risk precautions were continued: bed/ chair alarms, door signage, yellow bracelet and socks as well as update of the Travis Daiglein tool in the patient's room. Willy Score: 3    HEALS Safety Check    A safety check occurred in the patient's room between off going nurse and oncoming nurse listed above.     The safety check included the below items  Area Items   H  High Alert Medications - Verify all high alert medication drips (heparin, PCA, etc.)   E  Equipment - Suction is set up for ALL patients (with yanker)  - Red plugs utilized for all equipment (IV pumps, etc.)  - WOWs wiped down at end of shift.  - Room stocked with oxygen, suction, and other unit-specific supplies   A  Alarms - Bed alarm is set for fall risk patients  - Ensure chair alarm is in place and activated if patient is up in a chair   L  Lines - Check IV for any infiltration  - Bal bag is empty if patient has a Bal - Tubing and IV bags are labeled   S  Safety   - Room is clean, patient is clean, and equipment is clean. - Hallways are clear from equipment besides carts. - Fall bracelet on for fall risk patients  - Ensure room is clear and free of clutter  - Suction is set up for ALL patients (with roman)  - Hallways are clear from equipment besides carts.    - Isolation precautions followed, supplies available outside room, sign posted     Adrien Heredia RN

## 2021-10-07 NOTE — PROGRESS NOTES
[facility] PHYSICAL REHABILITATION  [address] [place] [address], [address]     INPATIENT REHABILITATION  DAILY PROGRESS NOTE     Date: 10/7/2021    Name: Tracy Smith Age / Sex: 60 y.o. / male   CSN: [id number] MRN: [id number]   [facility] Date: 9/18/2021 Length of Stay: 19 days     Primary Rehab Diagnosis: Impaired Mobility and ADLs secondary to:  1. Diabetic infection of right foot  2. S/P Incision and drainage, with decompression of multiple subfascial layers, right foot; Wound debridement, skin, soft tissue, and muscle, right foot (9/9/2021 - Dr. Noni Marcial)  3. S/P Incision and drainage of right foot (9/4/2021 - Dr. Africa Go.)      Subjective:     Patient seen and examined. Blood pressure controlled. No documented fever since admission. Blood glucose controlled. Team conference was held at bedside this PM.     Patient's Complaint:   No significant medical complaints    Pain Control: stable, mild-to-moderate joint symptoms intermittently, reasonably well controlled by current meds      Objective:     Vital Signs:  Patient Vitals for the past 24 hrs:   BP Temp Pulse Resp SpO2   10/07/21 0730 137/75 97.1 °F (36.2 °C) 81 16 100 %   10/06/21 1954 120/72 97.4 °F (36.3 °C) 77 19 100 %   10/06/21 1615 (!) 142/80 98 °F (36.7 °C) 77 19 100 %        Physical Examination:  GENERAL SURVEY: Patient is awake, alert, oriented x 3, sitting comfortably on the chair, not in acute respiratory distress.   HEENT: pink palpebral conjunctivae, anicteric sclerae, no nasoaural discharge, moist oral mucosa  NECK: supple, no jugular venous distention, no palpable lymph nodes  CHEST/LUNGS: symmetrical chest expansion, good air entry, clear breath sounds  HEART: adynamic precordium, good S1 S2, no S3, regular rhythm, no murmurs  ABDOMEN: flat, bowel sounds appreciated, soft, non-tender  EXTREMITIES: (+) right foot/ankle wrapped in ACE wrap, pink nailbeds, no edema, full and equal pulses, no calf tenderness NEUROLOGICAL EXAM: The patient is awake, alert and oriented x 3, able to answer questions fairly appropriately, able to follow 1 and 2 step commands. Able to tell time from the wall clock. Cranial nerves II-XII are grossly intact. No gross sensory deficit. Motor strength is 4 to 4+/5 on BUE and LLE, 4- to 4/5 on the RLE.        Current Medications:  Current Facility-Administered Medications   Medication Dose Route Frequency    insulin lispro (HUMALOG) injection 3 Units  3 Units SubCUTAneous TIDAC    insulin glargine (LANTUS) injection 10 Units  10 Units SubCUTAneous BID    lisinopriL (PRINIVIL, ZESTRIL) tablet 30 mg  30 mg Oral DAILY    metoprolol tartrate (LOPRESSOR) tablet 50 mg  50 mg Oral Q12H    polyethylene glycol (MIRALAX) packet 17 g  17 g Oral DAILY    heparin (porcine) injection 5,000 Units  5,000 Units SubCUTAneous Q8H    methIMAzole (TAPAZOLE) tablet 10 mg  10 mg Oral DAILY    oxyCODONE-acetaminophen (PERCOCET) 5-325 mg per tablet 1-2 Tablet  1-2 Tablet Oral Q6H PRN    acetaminophen (TYLENOL) tablet 650 mg  650 mg Oral Q4H PRN    cholecalciferol (VITAMIN D3) capsule 5,000 Units  5,000 Units Oral DAILY    famotidine (PEPCID) tablet 20 mg  20 mg Oral BID    magnesium hydroxide (MILK OF MAGNESIA) 400 mg/5 mL oral suspension 30 mL  30 mL Oral DAILY PRN    bisacodyL (DULCOLAX) tablet 10 mg  10 mg Oral Q48H PRN    glucose chewable tablet 16 g  4 Tablet Oral PRN    glucagon (GLUCAGEN) injection 1 mg  1 mg IntraMUSCular PRN    dextrose (D50W) injection syrg 12.5-25 g  25-50 mL IntraVENous PRN    hydrALAZINE (APRESOLINE) tablet 25 mg  25 mg Oral Q8H PRN       Allergies:  No Known Allergies      Functional Progress:    OCCUPATIONAL THERAPY    ON ADMISSION MOST RECENT   Eating  Functional Level: 5   Eating  Functional Level: 5     Grooming  Functional Level: 5   Grooming  Functional Level: 5     Bathing  Functional Level: 3   Bathing  Functional Level: 3     Upper Body Dressing  Functional Level: 4   Upper Body Dressing  Functional Level: 4     Lower Body Dressing  Functional Level: 2   Lower Body Dressing  Functional Level: 2     Toileting  Functional Level: 2   Toileting  Functional Level: 4     Toilet Transfers  Toilet Transfer Score: 4   Toilet Transfers  Toilet Transfer Score: 4     Tub /Shower Transfers  Tub/Shower Transfer Score: 0   Tub/Shower Transfers  Tub/Shower Transfer Score: 0       Legend:   7 - Independent   6 - Modified Independent   5 - Standby Assistance / Supervision / Set-up   4 - Minimum Assistance / Contact Guard Assistance   3 - Moderate Assistance   2 - Maximum Assistance   1 - Total Assistance / Dependent       Lab/Data Review:  Recent Results (from the past 24 hour(s))   GLUCOSE, POC    Collection Time: 10/06/21  5:14 PM   Result Value Ref Range    Glucose (POC) 76 70 - 110 mg/dL   GLUCOSE, POC    Collection Time: 10/06/21  5:29 PM   Result Value Ref Range    Glucose (POC) 71 70 - 110 mg/dL   GLUCOSE, POC    Collection Time: 10/06/21  5:56 PM   Result Value Ref Range    Glucose (POC) 119 (H) 70 - 110 mg/dL   GLUCOSE, POC    Collection Time: 10/06/21  7:53 PM   Result Value Ref Range    Glucose (POC) 174 (H) 70 - 110 mg/dL   HGB & HCT    Collection Time: 10/07/21  5:51 AM   Result Value Ref Range    HGB 10.8 (L) 13.0 - 16.0 g/dL    HCT 33.3 (L) 36.0 - 48.0 %   PLATELET COUNT    Collection Time: 10/07/21  5:51 AM   Result Value Ref Range    PLATELET 627 284 - 454 K/uL   GLUCOSE, POC    Collection Time: 10/07/21  7:22 AM   Result Value Ref Range    Glucose (POC) 113 (H) 70 - 110 mg/dL   GLUCOSE, POC    Collection Time: 10/07/21 12:06 PM   Result Value Ref Range    Glucose (POC) 107 70 - 110 mg/dL       Assessment:     Primary Rehabilitation Diagnosis  1. Impaired Mobility and ADLs  2. Diabetic infection of right foot  2. S/P Incision and drainage, with decompression of multiple subfascial layers, right foot;  Wound debridement, skin, soft tissue, and muscle, right foot (9/9/2021 - Dr. Kim Smith)  3. S/P Incision and drainage of right foot (9/4/2021 - Dr. Leo Joy.)    Comorbidities  Patient Active Problem List   Diagnosis Code    Diabetic infection of right foot ([facility] E11.628, L08.9    Essential hypertension I10    Vitamin D insufficiency E55.9    Impaired mobility and ADLs Z74.09, Z78.9    Type 2 diabetes mellitus without complication, with long-term current use of insulin ([facility] E11.9, Z79.4    COVID-19 ruled out by laboratory testing Z20.822    Status post incision and drainage Z98.890    History of incision and drainage Z98.890    Hyperthyroidism E05.90       Plan:     1. Justification for continued stay: Good progression towards established rehabilitation goals. 2. Medical Issues being followed closely:    [x]  Fall and safety precautions     [x]  Wound Care     [x]  Bowel and Bladder Function     [x]  Fluid Electrolyte and Nutrition Balance     [x]  Pain Control      3. Issues that 24 hour rehabilitation nursing is following:    [x]  Fall and safety precautions     [x]  Wound Care     [x]  Bowel and Bladder Function     [x]  Fluid Electrolyte and Nutrition Balance     [x]  Pain Control      [x]  Assistance with and education on in-room safety with transfers to and from the bed, wheelchair, toilet and shower. 4. Acute rehabilitation plan of care:    [x]  Continue current care and rehab. [x]  Physical Therapy           [x]  Occupational Therapy           []  Speech Therapy     []  Hold Rehab until further notice     5. Medications:    [x]  MAR Reviewed     [x]  Continue Present Medications     6. DVT Prophylaxis:      []  Enoxaparin     [x]  Unfractionated Heparin     []  Warfarin     []  NOAC     []  WYATT Stockings     []  Sequential Compression Device     []  None     7. Code status    [x]  Full code     []  Partial code     []  Do not intubate     []  Do not resuscitate     8.  Orders:   > Diabetic infection of right foot; S/P Incision and drainage, with decompression of multiple subfascial layers, right foot; Wound debridement, skin, soft tissue, and muscle, right foot (9/9/2021 - Dr. Laurie Ramos); S/P Incision and drainage of right foot (9/4/2021 - Dr. Rosalba Cee.)   > Nonweightbearing on the right foot   > Wound care nurse consult for WoundVac management   > Wound care instructions: Staff nurse to perform wound vac dressing changes Monday, Wednesday and fridays. Remove old dressing from right plantar foot wound and clean site  with wound spray. Apply skin prep  followed barrier ring then drapes. Cut hole over wound, apply adaptic  to wound bed then pack wound with black foam. Applied drapes  followed by diskus.  Settings 125mmhg continuous.    > On 9/28/2021, patient had completed the recommended treatment course of Cephalexin 500 mg PO q 6 hr    > Spoke with Podiatry (Dr. Laurie Ramos) via telephone and coordinated care of the patient. He recommended that on discharge on Friday (10/8/2021), that instead of dischargin im with a WoundVac, to instead discharge the patient with a wet to dry dressing since he will be inspecting the wound at his office on the day of discharge. He will put a WoundVac at his office after he inspects the wound.     > Pictures of the patient's wound was taken by Wound Care nurse and these were sent to the office of Dr. Laurie Ramos via fax     > Essential hypertension   > On 10/2/2021, increased Lisinopril from 20 mg to 30 mg PO once daily (9AM)   > Continue:    > Lisinopril 30 mg PO once daily (9AM)    > Metoprolol tartrate 25 mg PO q 12 hr (9AM, 9PM)    > Hydralazine 25 mg PO q 8 hr PRN for SBP greater than 170 mmHg    > Hyperthyroidism   > Continue Methimazole 10 mg PO once daily    > Type 2 diabetes mellitus without complication, with long-term current use of insulin   > HbA1c (9/3/2021) = 10.8   > On 9/25/2021, increased Insulin glargine from 15 units to 18 units SC q HS   > On 9/26/2021, started Insulin lispro 2 units SC TID AC   > On 9/28/2021, decreased Insulin glargine from 18 units to 16 units SC q HS   > On 9/29/2021, decreased insulin glargine from 16 units to 14 units SC q HS   > On 10/1/2021, changed Insulin glargine from 14 units SC q HS to 12 units once daily before breakfast   > On 10/2/2021:    > Increased Insulin glargine from 12 units to 15 units once daily before breakfast    > Increased Insulin lispro from 2 units to 4 units SC TID AC   > On 10/3/2021:    > Increased Insulin glargine from 15 units to 18 units once daily before breakfast --> Changed to 10 units SC BID (8AM, 8PM)    > Decrease Insulin lispro from 4 units to 3 units SC TID AC   > On 10/5/2021, decreased Insulin lispro from 3 units to 2 units SC TID AC   > On 10/6/2021:    > Discontinued Insulin lispro sliding scale SC TID AC only    > Increased Insulin lispro from 2 units to 3 units SC TID AC   > Continue:    > Decrease Insulin glargine from 10 units SC BID (8AM, 8PM) to 10 units SC q HS    > Insulin lispro 3 units SC TID AC    > Vitamin D insufficiency   > Vitamin D 25-Hydroxy (9/19/2021) = 24.0   > On 9/20/2021, started Cholecalciferol 5,000 units PO once daily   > Continue Cholecalciferol 5,000 units PO once daily    > COVID-19 ruled out by laboratory testing   > SARS-CoV-2 ([facility], [facility]) (9/8/2021):  Not detected   > Prior to admission and upon admission to the ARU, patient did not have any fever, desaturation, cough or difficulty breathing     > Analgesia   > Continue:    > Acetaminophen 650 mg PO q 4 hr PRN for pain level 4/10 or lesser    > Percocet 5/325 1-2 tabs PO q 6 hr PRN for pain level 5/10 or greater     > Diet:   > Specifications:     []  Low fat/Low cholesterol/High fiber/PATRICIA     []  Low fat/Low cholesterol/High fiber/2 gm Na     [x]  3 carb choices (45 gm/meal)     []  4 carb choices (60 gm/meal)     []  5 carb choices (75 gm/meal)     []  Other:      []  None      > Solids (consistency):    [x]  Regular     [] Easy to chew     []  Dysphagia - Soft & Bite-sized     []  Dysphagia - Minced & moist     []  Dysphagia - Pureed     []  Full liquid     []  Clear liquid      > Liquids (consistency):    [x]  Thin     []  Mildly thick (Nectar thick)     []  Moderately thick (Honey thick)     []  Extremely thick (Pudding thick)      > Fluid restriction: None       9. Personal Protective Equipment (N95 face mask) was used while interacting with the patient. Patient was using a surgical mask. 10. Patient's progress in rehabilitation and medical issues discussed with the patient. All questions answered to the best of my ability. Care plan discussed with patient and nurse. 11. Total clinical care time is 30 minutes, including review of chart including all labs, radiology, past medical history, and discussion with patient. Greater than 50% of my time was spent in coordination of care and counseling. 11. Discharge Planning:  Discharge date  10/8/2021 (Friday)   Discharge location  [x] Home     [] [address]    [] Other:    Follow-up services  [] Outpatient      [x] Home Health       [x] Physical Therapy              [x] Occupational Therapy       [] Speech Therapy                [] Medical Social Worker    [] Aide   [x] Skilled Nursing           [x] Medication reconciliation        [x] Disease education        [] PT/INR monitoring        [] Routine PICC line care        [] IV antibiotic administration            Antibiotic:             Stop date:        [] Tube feeding        [] Indwelling dennis catheter care        [] Wound Care/Dressing             Instructions:       [] Other:      Follow-up appointments  1. PCP (Dr. Rick Brown)   2.  Podiatry (Dr. Grover Pate)        Signed:    Marquita Lozada MD    October 7, 2021

## 2021-10-07 NOTE — INTERDISCIPLINARY ROUNDS
[facility] PHYSICAL REHABILITATION  [address] [place] [address], [address]    INPATIENT REHABILITATION  TEAM CONFERENCE SUMMARY     Date of Conference: 10/7/2021    Patient Information:        Name: Alli Jensen Age / Sex: 60 y.o. / male   CSN: [id number] MRN: [id number]   Admit Date: 9/18/2021 Length of Stay: 19 days     Primary Rehabilitation Diagnosis  1. Impaired Mobility and ADLs  2. Diabetic infection of right foot  3. S/P Incision and drainage, with decompression of multiple subfascial layers, right foot; Wound debridement, skin, soft tissue, and muscle, right foot (9/9/2021 - Dr. Kim Smith)  4.  S/P Incision and drainage of right foot (9/4/2021 - Dr. Leo Joy.)    Comorbidities  Patient Active Problem List   Diagnosis Code    Diabetic infection of right foot ([facility] E11.628, L08.9    Essential hypertension I10    Vitamin D insufficiency E55.9    Impaired mobility and ADLs Z74.09, Z78.9    Type 2 diabetes mellitus without complication, with long-term current use of insulin ([facility] E11.9, Z79.4    COVID-19 ruled out by laboratory testing Z20.822    Status post incision and drainage Z98.890    History of incision and drainage Z98.890    Hyperthyroidism E05.90          Therapy:     FIM SCORES Initial Assessment Weekly Progress Assessment 10/7/2021   Eating Functional Level: 5  Comments: set-up Feeding/Eating Assistance: 7 (Independent)7   Swallowing     Grooming 5 Grooming Assistance : 3 (Moderate assistance )6   Bathing 3 Bathing Assistance, Upper: 6 (Modified independent)5  Bathing Assistance, Lower : 5 (Supervision)   Upper Body Dressing Functional Level: 4  Items Applied/Steps Completed: Pullover (4 steps)  Comments: SBA/ Min A Dressing Assistance : 6 (Modified independent)6   Lower Body Dressing Functional Level: 2  Items Applied/Steps Completed: Elastic waist pants (3 steps), Shoe, left (1 step), Underpants (3 steps)  Comments: Care coordinated with Abdulkadir Grijalva RN for nurse to disconnect wound vac in order to don/doff boxers and elastic waist shorts; Min A to don L sock (1 step) Dressing Assistance : 4 (Minimal assistance) (asst to manage wound vac cord)  Position Performed: Seated in chair;Standing4   Toileting Functional Level: 2  Comments: BSC at the bedside; CM & hygiene performed in stance with RW while adhering to NWB R LE; Max A for pants down/up Toileting Assistance (FIM Score): 5 (Supervision)5   Bladder 0 1   Bowel  0 0   Toilet Transfer [place] Toilet Transfer Score: 4  Comments: Min A w/ RW (stand pivot LLE); NWB R LE 4 (Contact guard assistance)4   Tub/Shower Transfer [place] Tub or Shower Type: Tub/Shower combination  Tub/Shower Transfer Score: 0  Comments: Did not assess due to safety (NWB RLE) with wound vac intact R foot  0 Not addressed  NA (Not applicable) (not addressed at this time)   Comprehension Primary Mode of Comprehension: Auditory  Score: 5     5   Expression Primary Mode of Expression: Verbal  Score: 5  5      Social Interaction Score: 5  5   Problem Solving Score: 5  5   Memory Score: 5  5     FIM SCORES Initial Assessment Weekly Progress Assessment 10/7/2021   Bed/Chair/Wheelchair Transfers Transfer Type: Other  Other: stand pivot with RW; and lateral squat pivot  Transfer Assistance : 4 (Minimal assistance) (with both methods; vc's fo hand placement w/ squat pivot)  Sit to Stand Assistance: Minimal assistance  Car Transfers: Minimum assistance (assist for R LE in/out of car & mgm't of door)  Car Type: car simulator in rehab Transfer Type:  Other  Other: stand step with RW  Transfer Assistance : 6 (Modified independent)  Sit to Stand Assistance: Modified independent  Car Transfers:  (Modified [place])  Car Type: Car Transfer Trainer   Bed Mobility Rolling Right 5 (Stand-by assistance)   Rolling Left 5 (Stand-by assistance)   Supine to Sit 5 (Stand-by assistance)   Sit to Stand Minimal assistance   Sit to Supine  (CGA for R LE & mgm't of wound vac tube)    Rolling Right   6 (Modified independent)   Rolling Left   6 (Modified independent)   Supine to Sit   6 (Modified independent)   Sit to Stand   Modified independent   Sit to Supine   6 (Modified independent)      Locomotion (W/C) Able to Propel (ft): 75 feet  Functional Level:  (min A)  Curbs/Ramps Assist Required (FIM Score): 0 (Not tested)  Wheelchair Setup Assist Required : 2 (Maximal assistance)  Wheelchair Management: Manages left brake, Manages right brake (with supervision & vc's) Function 6  Setup Assistance  4 (Minimal assistance)      Locomotion (W/C distance)       Locomotion (Walk) 4 (Contact guard assistance) 5 (Supervision/setup)  Walker, rolling;Gait belt   Locomotion (Walk dist.) 35 Feet (ft) 150 Feet (ft)   Steps/Stairs Steps/Stairs Ambulated (#): 1 (4\" height step)  Level of Assist : 3 (Moderate assistance)  Rail Use: Both  Pt negotiated 4\" curb x 4 repetitions with RW and CGA         Nursing:     Neuro:   AAA&O x   4         Respiratory:   [x] WNL   [] O2 LPM:   Other:  Peripheral Vascular:   [] TEDS present   [] Edema present ____ Grade   Cardiac:   [x] WNL   [] Other  Genitourinary:   [x] continent   [] incontinent   [] dennis  Abdominal _10/4______ LBM  GI: _diabetic______ Diet _thin_____ Liquids _____ tube feeds  Musculoskeletal: ____ ROM Transfers _____ Assistive Device Used  _mod___ Level of Assistance  Skin Integumentary:   [x] Intact   [] Not Intact   __________Preventative Measures  Details_______wound vac to right foot_______________________________________________________  Pain: [x] Controlled   [] Not Controlled   Pain Meds:   [] Scheduled   [] PRN        Registered Dietitian / Nutrition:   No data found. Pt continues to have good appetite/ meal intake. Eating >75% of meals. Tolerating diet.      Supplements:          [] Yes   [x] No      Amount of supplement consumed: not applicable       Intake/Output Summary (Last 24 hours) at 10/7/2021 1521  Last data filed at 10/7/2021 0948  Gross per 24 hour   Intake 1320 ml   Output    Net 1320 ml                                Last bowel movement: 10/5,   10/4      Interdisciplinary Team Goals:     1. Discipline  Physical Therapy    Goal  Encourage pt to demonstrate consistent safe awareness of wound vac line in preparation for safe d/c home. Barrier  Impaired strength and balance, NWB Right LE    Intervention  Education, Mobility training, Strength and Balance training    Goal written by:   Dahiana James PT, DPT     2. Discipline  Occupational Therapy    Goal  Pt will continue to increase safety awareness during transfers while managing wound vac cord. Barrier  Decreased strength NWB on RLE    Intervention  Therex, Theract, Transfer Training     Goal written by:  Igor Olivares, MSOTR/L      3. Discipline  Speech Therapy    Goal      Barrier      Intervention      Goal written by:       4. Discipline  Nursing    Goal  Pt. Wound will heal properly and skin will remain intact free from breakdown or pressure ulcers. Barrier  decreased mobility, Diabetic infection of right foot    Intervention  Inspect skin/wound routinely, keep clean,dry and due proper skin/wound care as ordered     Goal written by:  Caryle Powers, RN     5. Discipline  Clinical Psychology    Goal  Understand all safety and pace recommendations for discharge    Barrier  Transition from acute rehabilitation to outpatient recovery    Intervention  Patient education    Goal written by:  Duane Bouchard, PhD     6. Discipline  Nutrition / Dietetics    Goal  - PO nutrition intake will continue to meet >75% of patients estimated nutritional needs over the next 7 days. Barrier  none known     Intervention  continue po diet    Goal written by:  Tammi Dueñas RD       Disposition / Discharge Planning:      Follow-up services:  [x] Physical Therapy             [x] Occupational Therapy       [] Speech Therapy           [] Skilled Nursing      [] Medical Social Worker   [] Aide        [] Outpatient      [] vs   [x] Home Health  [] vs       [] to progress to outpatient       [] with 24-hour supervision       [x] with 24-hour assistance   [] [place]   [facility] recommendations: W/c and RW; bedside commode   Estimated discharge date:  10/8/2021   Discharge Location:  [x] Home  [] versus    [] [place]    [] [address]   [] Other:           Interdisciplinary team rounds were held this PM with the following team members:       Name   Physical Therapist    Lidia Guardado PT, DPT     Occupational Therapist    William Calderon, MSOTR/L   Recreational Therapist    Benedict Obrien, [address] [place] [address]    Amanda Boyd RN      Physician    Yariel Hi MD        Marlin Haynes RN          Signed:  Yariel Hi MD    October 7, 2021

## 2021-10-07 NOTE — PROGRESS NOTES
Problem: Pressure Injury - Risk of  Goal: *Prevention of pressure injury  Description: Document Sarabjit Scale and appropriate interventions in the flowsheet. Outcome: Progressing Towards Goal  Note: Pressure Injury Interventions:  Sensory Interventions: Assess changes in LOC         Activity Interventions: Pressure redistribution bed/mattress(bed type)    Mobility Interventions: Pressure redistribution bed/mattress (bed type)    Nutrition Interventions: Document food/fluid/supplement intake    Friction and Shear Interventions: HOB 30 degrees or less                Problem: Patient Education: Go to Patient Education Activity  Goal: Patient/Family Education  Outcome: Progressing Towards Goal     Problem: Falls - Risk of  Goal: *Absence of Falls  Description: Document Willy Fall Risk and appropriate interventions in the flowsheet. Outcome: Progressing Towards Goal  Note: Fall Risk Interventions:  Mobility Interventions: Bed/chair exit alarm    Mentation Interventions: Adequate sleep, hydration, pain control    Medication Interventions: Bed/chair exit alarm    Elimination Interventions: Bed/chair exit alarm              Problem: Patient Education: Go to Patient Education Activity  Goal: Patient/Family Education  Outcome: Progressing Towards Goal     Problem: Patient Education: Go to Patient Education Activity  Goal: Patient/Family Education  Outcome: Progressing Towards Goal     Problem: Patient Education: Go to Patient Education Activity  Goal: Patient/Family Education  Outcome: Progressing Towards Goal     Problem: Diabetes Self-Management  Goal: *Disease process and treatment process  Description: Define diabetes and identify own type of diabetes; list 3 options for treating diabetes. Outcome: Progressing Towards Goal  Goal: *Incorporating nutritional management into lifestyle  Description: Describe effect of type, amount and timing of food on blood glucose; list 3 methods for planning meals.   Outcome: Progressing Towards Goal  Goal: *Incorporating physical activity into lifestyle  Description: State effect of exercise on blood glucose levels. Outcome: Progressing Towards Goal  Goal: *Developing strategies to promote health/change behavior  Description: Define the ABC's of diabetes; identify appropriate screenings, schedule and personal plan for screenings. Outcome: Progressing Towards Goal  Goal: *Using medications safely  Description: State effect of diabetes medications on diabetes; name diabetes medication taking, action and side effects. Outcome: Progressing Towards Goal  Goal: *Monitoring blood glucose, interpreting and using results  Description: Identify recommended blood glucose targets  and personal targets. Outcome: Progressing Towards Goal  Goal: *Prevention, detection, treatment of acute complications  Description: List symptoms of hyper- and hypoglycemia; describe how to treat low blood sugar and actions for lowering  high blood glucose level. Outcome: Progressing Towards Goal  Goal: *Prevention, detection and treatment of chronic complications  Description: Define the natural course of diabetes and describe the relationship of blood glucose levels to long term complications of diabetes.   Outcome: Progressing Towards Goal  Goal: *Developing strategies to address psychosocial issues  Description: Describe feelings about living with diabetes; identify support needed and support network  Outcome: Progressing Towards Goal  Goal: *Sick day guidelines  Outcome: Progressing Towards Goal     Problem: Patient Education: Go to Patient Education Activity  Goal: Patient/Family Education  Outcome: Progressing Towards Goal

## 2021-10-07 NOTE — ROUTINE PROCESS
SHIFT CHANGE NOTE FOR [facility]    Bedside and Verbal shift change report given to Ivette Angelo RN (oncoming nurse) by Kathleen Blunt RN (offgoing nurse). Report included the following information SBAR, Kardex, MAR and Recent Results. Situation:   Code Status: Full Code   Hospital Day: 19   Problem List:   Hospital Problems  Date Reviewed: 10/7/2021        Codes Class Noted POA    Essential hypertension (Chronic) ICD-10-CM: I10  ICD-9-CM: 401.9  Unknown Yes        Type 2 diabetes mellitus without complication, with long-term current use of insulin (HCC) (Chronic) ICD-10-CM: E11.9, Z79.4  ICD-9-CM: 250.00, V58.67  Unknown Yes    Overview Signed 9/20/2021 11:13 PM by Claudia Lombardo MD     HbA1c (9/3/2021) = 10.8             Hyperthyroidism (Chronic) ICD-10-CM: E05.90  ICD-9-CM: 242.90  Unknown Yes        Vitamin D insufficiency (Chronic) ICD-10-CM: E55.9  ICD-9-CM: 268.9  9/19/2021 Yes    Overview Signed 9/20/2021 11:11 PM by Claudia Lombardo MD     Vitamin D 25-Hydroxy (9/19/2021) = 24.0             Status post incision and drainage ICD-10-CM: Z98.890  ICD-9-CM: V45.89  9/9/2021 Yes    Overview Signed 9/20/2021 11:18 PM by Claudia Lombardo MD     S/P Incision and drainage, with decompression of multiple subfascial layers, right foot; Wound debridement, skin, soft tissue, and muscle, right foot (9/9/2021 - Dr. Laurie Ramos)             FYSEW-34 ruled out by laboratory testing ICD-10-CM: Z20.822  ICD-9-CM: V01.79  9/8/2021 Yes    Overview Signed 9/20/2021 11:14 PM by Claudia Lombardo MD     SARS-CoV-2 ([address] [place] [address]) (9/8/2021):  Not detected             History of incision and drainage ICD-10-CM: Z98.890  ICD-9-CM: V45.89  9/4/2021 Yes    Overview Signed 9/20/2021 11:18 PM by Claudia Lombardo MD     S/P Incision and drainage of right foot (9/4/2021 - Dr. Rosalba Cee.)             * (Principal) Diabetic infection of right foot [facility]) ICD-10-CM: E11.628, L08.9  ICD-9-CM: 250.80, 686.9  9/2/2021 Yes Impaired mobility and ADLs ICD-10-CM: Z74.09, Z78.9  ICD-9-CM: V49.89  9/2/2021 Yes              Background:   Past Medical History:   Past Medical History:   Diagnosis Date    COVID-19 ruled out by laboratory testing 9/8/2021    SARS-CoV-2 ([facility]) (9/8/2021):  Not detected    Diabetic infection of right foot ([address] 9/2/2021    Essential hypertension     Hyperthyroidism     Type 2 diabetes mellitus without complication, with long-term current use of insulin ([facility])     HbA1c (9/3/2021) = 10.8    Vitamin D insufficiency 9/19/2021    Vitamin D 25-Hydroxy (9/19/2021) = 24.0        Assessment:   Changes in Assessment throughout shift: No change to previous assessment     Patient has a central line: no Reasons if yes: na  Insertion date:na Last dressing date:na   Patient has Dennis Cath: no Reasons if yes: na   Insertion date:na  Shift dennis care completed: NO     Last Vitals:     Vitals:    10/06/21 1615 10/06/21 1954 10/07/21 0730 10/07/21 1639   BP: (!) 142/80 120/72 137/75 (!) 158/88   Pulse: 77 77 81 94   Resp: 19 19 16 18   Temp: 98 °F (36.7 °C) 97.4 °F (36.3 °C) 97.1 °F (36.2 °C) 97.4 °F (36.3 °C)   SpO2: 100% 100% 100% 98%   Weight:       Height:            PAIN    Pain Assessment    Pain Intensity 1: 0 (10/07/21 1648) Pain Intensity 1: 2 (12/29/14 1105)    Pain Location 1: Leg Pain Location 1: Abdomen    Pain Intervention(s) 1: Medication (see MAR) Pain Intervention(s) 1: Medication (see MAR)  Patient Stated Pain Goal: 0 Patient Stated Pain Goal: 0  o Intervention effective: yes  o Other actions taken for pain:       Skin Assessment  Skin color Skin Color: Appropriate for ethnicity  Condition/Temperature Skin Condition/Temp: Dry, Warm  Integrity Skin Integrity: Wound (add Wound LDA)  Turgor Turgor: Non-tenting  Weekly Pressure Ulcer Documentation  Pressure  Injury Documentation: No Pressure Injury Noted-Pressure Ulcer Prevention Initiated  Wound Prevention & Protection Methods  Orientation of wound Orientation of Wound Prevention: Posterior  Location of Prevention Location of Wound Prevention: Buttocks, Sacrum/Coccyx  Dressing Present Dressing Present : No  Dressing Status    Wound Offloading Wound Offloading (Prevention Methods): Bed, pressure redistribution/air     INTAKE/OUPUT  Date 10/06/21 0700 - 10/07/21 0659 10/07/21 0700 - 10/08/21 0659   Shift 1878-9584 5363-0773 24 Hour Total 4656-7939 8708-0785 24 Hour Total   INTAKE   P.O. 1380  1380 940  940     P. O. 1380  1380 940  940   Shift Total(mL/kg) 1380(14.4)  1380(14.4) 940(9.8)  940(9.8)   OUTPUT   Urine(mL/kg/hr)           Urine Occurrence(s) 4 x 3 x 7 x 2 x  2 x   Stool           Stool Occurrence(s) 1 x 1 x 2 x 2 x  2 x   Shift Total(mL/kg)         NET 1380  1380 940  940   Weight (kg) 96 96 96 96 96 96       Recommendations:  1. Patient needs and requests:assist with toileting    2. Pending tests/procedures: na     3. Functional Level/Equipment:   / Wheelchair    Fall Precautions:   Fall risk precautions were reinforced with the patient; he was instructed to call for help prior to getting up. The following fall risk precautions were continued: bed/ chair alarms, door signage, yellow bracelet and socks as well as update of the Ilana Starch tool in the patient's room. Willy Score:      HEALS Safety Check    A safety check occurred in the patient's room between off going nurse and oncoming nurse listed above.     The safety check included the below items  Area Items   H  High Alert Medications - Verify all high alert medication drips (heparin, PCA, etc.)   E  Equipment - Suction is set up for ALL patients (with yanker)  - Red plugs utilized for all equipment (IV pumps, etc.)  - WOWs wiped down at end of shift.  - Room stocked with oxygen, suction, and other unit-specific supplies   A  Alarms - Bed alarm is set for fall risk patients  - Ensure chair alarm is in place and activated if patient is up in a chair   L  Lines - Check IV for any infiltration  - Bal bag is empty if patient has a Bal   - Tubing and IV bags are labeled   S  Safety   - Room is clean, patient is clean, and equipment is clean. - Hallways are clear from equipment besides carts. - Fall bracelet on for fall risk patients  - Ensure room is clear and free of clutter  - Suction is set up for ALL patients (with yanker)  - Hallways are clear from equipment besides carts.    - Isolation precautions followed, supplies available outside room, sign posted     Dalia Perez RN

## 2021-10-07 NOTE — PROGRESS NOTES
Problem: Self Care Deficits Care Plan (Adult)  Goal: *Acute Goals and Plan of Care (Insert Text)  Description: Occupational Therapy Goals   Long Term Goals  Initiated 9/19/2021 and to be accomplished within 3 week(s), by 10/10/2021. Updated 10/7/21  1. Pt will perform self-feeding with I.  10/7/21  2. Pt will perform grooming with I.  10/7/21  3. Pt will perform UB bathing with Mod I and use of AE PRN.  10/7/21  4. Pt will perform LB bathing with Mod I and use of AE PRN. NM-requires S/SBA  5. Pt will perform tub/shower transfer with supv and use of LRAD. Not addressed  6. Pt will perform UB dressing with Mod I.  10/7/21  7. Pt will perform LB dressing with supv and use of AE PRN. NM-requires Ulices  8. Pt will perform toileting task with Mod I. NM-requires SBA/S  9. Pt will perform toilet transfer with supv and use of LRAD. NM-requires CGA      Short Term Goals   Initiated 9/19/2021 and to be accomplished within 7 day(s), updated 9/25/2021; updated 10/7/21  1. Pt will perform self-feeding with Mod I. ( 9/25/2021)  2. Pt will perform grooming with set-up. ( 9/25/2021)  3. Pt will perform UB bathing with SBA and use of AE PRN. ( 9/25/2021)  4. Pt will perform LB bathing with Min A and use of AE PRN. ( 9/25/2021)  5. Pt will perform tub/shower transfer with Min A and use of LRAD. Not address at this time  6. Pt will perform UB dressing with SBA. ( 9/25/2021)  7. Pt will perform LB dressing with Mod A and use of AE PRN. ( 9/25/2021)  8. Pt will perform toileting task with Mod A. ( 9/25/2021)  9. Pt will perform toilet transfer with CGA and use of LRAD.  ( 9/25/2021)    OCCUPATIONAL THERAPY DISCHARGE    Patient: Phillip Garza (49 y.o. male)  Date: 10/7/2021    First Tx Session  Time In: 0730  Time Out[de-identified] 0900    Primary Diagnosis: Diabetic foot infection ([facility] [E11.628, L08.9] Diabetic infection of right foot ([facility]    Precautions:   Fall, NWB    Barriers to Learning/Limitations: yes; cognitive  Compensate with: visual, verbal, tactile, kinesthetic cues/model     Patient identified with name and : Yes    SUBJECTIVE:   Patient stated I am going to my doctor after I leave here.     OBJECTIVE DATA SUMMARY:     Past Medical History:   Diagnosis Date    COVID-19 ruled out by laboratory testing 2021    SARS-CoV-2 ([facility]) (2021): Not detected    Diabetic infection of right foot ([address] 2021    Essential hypertension     Hyperthyroidism     Type 2 diabetes mellitus without complication, with long-term current use of insulin ([facility])     HbA1c (9/3/2021) = 10.8    Vitamin D insufficiency 2021    Vitamin D 25-Hydroxy (2021) = 24.0     Past Surgical History:   Procedure Laterality Date    HX OTHER SURGICAL      surgery on left eye    HX OTHER SURGICAL Right 2021    S/P Incision and drainage of right foot (2021 - Dr. Mirian Hannon.)    HX OTHER SURGICAL Right 2021    S/P Incision and drainage, with decompression of multiple subfascial layers, right foot; Wound debridement, skin, soft tissue, and muscle, right foot (2021 - Dr. Praneeth Piper)     Prior Level of Function/Home Situation:Yes   Home Situation  Home Environment: Private residence  # Steps to Enter: 4  Rails to Enter: Yes  Hand Rails : Bilateral (spaced wide apart)  Wheelchair Ramp: No  One/Two Story Residence: One story  Living Alone: No  Support Systems: Spouse/Significant Other, Child(orberto)  Patient Expects to be Discharged to[de-identified] House  Current DME Used/Available at Home: None  Tub or Shower Type: Tub/Shower combination  [x]     Right hand dominant   []     Left hand dominant    Therapeutic Exercise:      Pain:  Pt reports 0/10 pain or discomfort prior to treatment. Pt reports 0/10 pain or discomfort post treatment.    Problem List:    Decreased strength B UE  [x]     Decreased strength trunk/core  [x]     Decreased AROM   [x]     Decreased PROM  [x]     Decreased balance sitting [x]     Decreased balance standing  [x]     Decreased endurance  [x]     Pain  []       Functional Limitations:   Decreased independence with ADL  [x]     Decreased independence with functional transfers  [x]     Decreased independence with ambulation  [x]     Decreased independence with IADL  [x]       Outcome Measures:      MMT Initial Assessment    Right Upper Extremity  Left Upper Extremity    UE AROM RUE AROM WFL LUE AROM WFL   Shoulder flexion 4-/5 4-/5   Shoulder extension       Shoulder ABDuction 4-/5 4-/5   Shoulder ADDUction       Elbow Flexion 4-/5 4-/5   Elbow Extension       Wrist Extension/Flexion 4-/5 4-/5    4-/5 4-/5       MMT Discharge Assessment   Right Upper Extremity  Left Upper Extremity    UE AROM WFL ROM; MMT 5/5 *WFL ROM; MMT 5/5    [facility]  W[facility]       0/5 No palpable muscle contraction  1/5 Palpable muscle contraction, no joint movement  2-/5 Less than full range of motion in gravity eliminated position  2/5 Able to complete full range of motion in gravity eliminated position  2+/5 Able to initiate movement against gravity  3-/5 More than half but not full range of motion against gravity  3/5 Able to complete full range of motion against gravity  3+/5 Completes full range of motion against gravity with minimal resistance  4-/5 Completes full range of motion against gravity with minimal resistance  4/5 Completes full range of motion against gravity with moderate resistance  5/5 Completes full range of motion against gravity with maximum resistance    Coordination:  Appears Intact  Sensation: Appears Intact    FIM SCORES Initial Assessment Discharge Assessment   Eating 5 Feeding/Eating  Feeding/Eating Assistance: 7 (Independent)   Grooming 5 Grooming  Grooming Assistance : 3 (Moderate assistance )    Oral Hygiene FIM: 6    Bathing 3 Upper Body Bathing  Bathing Assistance, Upper: 6 (Modified independent)  Lower Body Bathing  Bathing Assistance, Lower : 5 (Supervision)   Upper Body Dressing 4 Upper Body Dressing   Dressing Assistance : 6 (Modified independent)   Lower Body Dressing 2 Lower Body Dressing   Dressing Assistance : 4 (Minimal assistance) (asst to manage wound vac cord)  Position Performed: Seated in chair;Standing    Sock and/or Shoe Management FIM: 6   Toileting 2 Toileting  Toileting Assistance (FIM Score): 5 (Supervision)   Tub/Shower Transfer 0     Toilet Transfer 4 Functional Transfers  Toilet Transfer : Stand pivot transfer with walker  Amount of Assistance Required: 4 (Contact guard assistance)  Amount of Assistance Required: NA (Not applicable) (not addressed at this time)   Comprehension 5     Expression 5     Social Interaction 5     Problem Solving 5     Memory 5     Please see [facility] Interdisciplinary Eval: Coordination/Balance Section for details regarding FIM score description. Activity Tolerance:   Fair    ASSESSMENT:  Pt has progressed well showing CGA-S with ADLs 2/2 to safety concerns with managing wound vac. Pt has met 8/9 STG's and 4/9 LTG's. Pt to continue with POC at next level. Progression toward goals:  [x]      Improving appropriately and progressing toward goals  []      Improving slowly and progressing toward goals  []      Not making progress toward goals and plan of care will be adjusted     PLAN:  Pt would benefit from continued skilled occupational therapy in order to improve ADL function and IADL with use of least restrictive device. Interventions may include range of motion (AROM, PROM B UE/trunk), motor function (B UE/trunk strengthening/coordination), activity tolerance (vitals, oxygen saturation levels), ADL, balance activities, IADL, and functional transfer training. Discharge Recommendations: Home Health with 24 hr asst  Further Equipment Recommendations for Discharge: bedside commode        Please refer to the flow sheet for vital signs taken during this treatment.   After treatment:   [x]  Patient left in no apparent distress sitting up in chair  []  Patient left in no apparent distress in bed  []  Call bell left within reach  []  Nursing notified  []  Caregiver present  []  Bed alarm activated    COMMUNICATION/EDUCATION:   Communication/Collaboration:  []      Home safety education was provided and the patient/caregiver indicated understanding. [x]      Patient/family have participated as able and agree with findings and recommendations. []      Patient is unable to participate in plan of care at this time.     Zack Sevilla, OT  10/7/2021

## 2021-10-08 VITALS
OXYGEN SATURATION: 100 % | SYSTOLIC BLOOD PRESSURE: 128 MMHG | HEART RATE: 88 BPM | WEIGHT: 211.6 LBS | DIASTOLIC BLOOD PRESSURE: 75 MMHG | BODY MASS INDEX: 29.62 KG/M2 | RESPIRATION RATE: 16 BRPM | HEIGHT: 71 IN | TEMPERATURE: 98.7 F

## 2021-10-08 LAB
GLUCOSE BLD STRIP.AUTO-MCNC: 159 MG/DL (ref 70–110)
GLUCOSE BLD STRIP.AUTO-MCNC: 258 MG/DL (ref 70–110)

## 2021-10-08 PROCEDURE — 74011250637 HC RX REV CODE- 250/637: Performed by: INTERNAL MEDICINE

## 2021-10-08 PROCEDURE — 74011250637 HC RX REV CODE- 250/637: Performed by: FAMILY MEDICINE

## 2021-10-08 PROCEDURE — 77030018836 HC SOL IRR NACL ICUM -A

## 2021-10-08 PROCEDURE — 82962 GLUCOSE BLOOD TEST: CPT

## 2021-10-08 PROCEDURE — 74011250636 HC RX REV CODE- 250/636: Performed by: INTERNAL MEDICINE

## 2021-10-08 PROCEDURE — 2709999900 HC NON-CHARGEABLE SUPPLY

## 2021-10-08 PROCEDURE — 74011250637 HC RX REV CODE- 250/637: Performed by: EMERGENCY MEDICINE

## 2021-10-08 PROCEDURE — 99239 HOSP IP/OBS DSCHRG MGMT >30: CPT | Performed by: INTERNAL MEDICINE

## 2021-10-08 PROCEDURE — 77030025414 HC DRSG VAC ASST SMPLC KCON -B

## 2021-10-08 PROCEDURE — 74011636637 HC RX REV CODE- 636/637: Performed by: INTERNAL MEDICINE

## 2021-10-08 RX ORDER — INSULIN LISPRO 100 [IU]/ML
INJECTION, SOLUTION INTRAVENOUS; SUBCUTANEOUS
Qty: 1 EACH | Refills: 0 | Status: SHIPPED | OUTPATIENT
Start: 2021-10-08

## 2021-10-08 RX ORDER — INSULIN GLARGINE 100 [IU]/ML
20 INJECTION, SOLUTION SUBCUTANEOUS
Qty: 1 ADJUSTABLE DOSE PRE-FILLED PEN SYRINGE | Refills: 0 | Status: SHIPPED | OUTPATIENT
Start: 2021-10-08

## 2021-10-08 RX ADMIN — OXYCODONE HYDROCHLORIDE AND ACETAMINOPHEN 1 TABLET: 5; 325 TABLET ORAL at 10:15

## 2021-10-08 RX ADMIN — POLYETHYLENE GLYCOL 3350 17 G: 17 POWDER, FOR SOLUTION ORAL at 09:46

## 2021-10-08 RX ADMIN — HEPARIN SODIUM 5000 UNITS: 5000 INJECTION INTRAVENOUS; SUBCUTANEOUS at 00:30

## 2021-10-08 RX ADMIN — FAMOTIDINE 20 MG: 20 TABLET, FILM COATED ORAL at 09:46

## 2021-10-08 RX ADMIN — LISINOPRIL 40 MG: 20 TABLET ORAL at 09:46

## 2021-10-08 RX ADMIN — METHIMAZOLE 10 MG: 10 TABLET ORAL at 09:46

## 2021-10-08 RX ADMIN — Medication 5000 UNITS: at 09:46

## 2021-10-08 RX ADMIN — METOPROLOL TARTRATE 50 MG: 50 TABLET, FILM COATED ORAL at 09:46

## 2021-10-08 RX ADMIN — INSULIN LISPRO 3 UNITS: 100 INJECTION, SOLUTION INTRAVENOUS; SUBCUTANEOUS at 12:42

## 2021-10-08 RX ADMIN — INSULIN LISPRO 3 UNITS: 100 INJECTION, SOLUTION INTRAVENOUS; SUBCUTANEOUS at 08:40

## 2021-10-08 RX ADMIN — HEPARIN SODIUM 5000 UNITS: 5000 INJECTION INTRAVENOUS; SUBCUTANEOUS at 09:46

## 2021-10-08 NOTE — ROUTINE PROCESS
0800 Pt. Awake sitting up in bed no change in assessment pt. Reported to be feeling fine. 0930 Pt. Sitting up in chair eating breakfast. Pt. Reported to be ready for discharge home. 1000 Dr. Cassandra Hurst gave order for wet to dry dressing to right foot and dc wound vac. Plan to see surgeon today by 2 pm.  1100 pt. Verbalized understanding of discharge instructions and prescriptions.

## 2021-10-08 NOTE — HOME CARE
Discharge order noted for today, [facility] will follow patient for SN for wound care/wound vac ,PT,OT; Informed by  ARU  Mamadou Diaz) that patient will discharge with a wet to dry dressing from ARU today , then patient will be seen today at 2pm at Dr Yancy Goldberg office for wound evaluation and wound vac placement from surgeon's office , spoke to patient's wife  Tere Gonzalez) ,she states patient has received ordered DME: W/C,RW and BSC from [place] and she will be privately purchasing a TTB for patient; [facility] referral updated and processed to [facility] central intake. FRED ABEL.

## 2021-10-08 NOTE — DISCHARGE INSTRUCTIONS
DISCHARGE SUMMARY from Nurse    PATIENT INSTRUCTIONS:    After general anesthesia or intravenous sedation, for 24 hours or while taking prescription Narcotics:  · Limit your activities  · Do not drive and operate hazardous machinery  · Do not make important personal or business decisions  · Do  not drink alcoholic beverages  · If you have not urinated within 8 hours after discharge, please contact your surgeon on call. Report the following to your surgeon:  · Excessive pain, swelling, redness or odor of or around the surgical area  · Temperature over 100.5  · Nausea and vomiting lasting longer than 4 hours or if unable to take medications  · Any signs of decreased circulation or nerve impairment to extremity: change in color, persistent  numbness, tingling, coldness or increase pain  · Any questions    What to do at Home:  Recommended activity: Activity as tolerated, and as directed by your physician. If you experience any of the following symptoms chest pain or difficulty breathing, please follow up with the emergency dept. *  Please give a list of your current medications to your Primary Care Provider. *  Please update this list whenever your medications are discontinued, doses are      changed, or new medications (including over-the-counter products) are added. *  Please carry medication information at all times in case of emergency situations. These are general instructions for a healthy lifestyle:    No smoking/ No tobacco products/ Avoid exposure to second hand smoke  Surgeon General's Warning:  Quitting smoking now greatly reduces serious risk to your health.     Obesity, smoking, and sedentary lifestyle greatly increases your risk for illness    A healthy diet, regular physical exercise & weight monitoring are important for maintaining a healthy lifestyle    You may be retaining fluid if you have a history of heart failure or if you experience any of the following symptoms:  Weight gain of 3 pounds or more overnight or 5 pounds in a week, increased swelling in our hands or feet or shortness of breath while lying flat in bed. Please call your doctor as soon as you notice any of these symptoms; do not wait until your next office visit. The discharge information has been reviewed with the patient. The patient verbalized understanding. Discharge medications reviewed with the patient and appropriate educational materials and side effects teaching were provided. ___________________________________________________________________________________________________________________________________    ------------------------------------------------------------------------------------------------------------    DISCHARGE INSTRUCTIONS    1. Make sure that when you request refills at the pharmacy that the refill requests are sent to your PCP (NOT to the prescriber at the [facility]) to avoid any delays in getting your medication refills. The physician at the [facility] will not able to order medications or refills after discharge -- Please understand that though we would like to help, it is simply not safe for our physician to order you a medication that we cannot monitor. 2. If any of the prescribed medications require a PRIOR AUTHORIZATION, contact your Primary Care Physician or specialist to EITHER complete prior authorizations and paperwork on your behalf OR prescribe an alternative medication. -- Please understand that though we would like to help, it is simply not safe for our physician to order you a medication that we cannot monitor.      3. If any of your prescriptions medications PRIOR TO ADMISSION TO [place] [address] needs a refill (and either the dosage, frequency or instructions was not changed), kindly contact your Primary Care Physician for refills. -------------------------------------------------------------------------------------------------------------------          Patient armband removed and shredded                MyChart Activation    Thank you for requesting access to [facility]. Please follow the instructions below to securely access and download your online medical record. [facility] allows you to send messages to your doctor, view your test results, renew your prescriptions, schedule appointments, and more. How Do I Sign Up? 1. In your internet browser, go to [email or web address]  2. Click on the First Time User? Click Here link in the Sign In box. You will be redirect to the New Member Sign Up page. 3. Enter your [facility] Access Code exactly as it appears below. You will not need to use this code after youve completed the sign-up process. If you do not sign up before the expiration date, you must request a new code. [facility] Access Code: [email or web address]  Expires: 2021  5:36 PM (This is the date your [facility] access code will )    4. Enter the last four digits of your Social Security Number (xxxx) and Date of Birth (mm/dd/yyyy) as indicated and click Submit. You will be taken to the next sign-up page. 5. Create a [facility] ID. This will be your [facility] login ID and cannot be changed, so think of one that is secure and easy to remember. 6. Create a [facility] password. You can change your password at any time. 7. Enter your Password Reset Question and Answer. This can be used at a later time if you forget your password. 8. Enter your e-mail address. You will receive e-mail notification when new information is available in [address] 9. Click Sign Up. You can now view and download portions of your medical record. 10. Click the Download Summary menu link to download a portable copy of your medical information.     Additional Information    If you have questions, please visit the Frequently Asked Questions section of the [facility] website at [email or web address] Remember, [facility] is NOT to be used for urgent needs. For medical emergencies, dial 911.

## 2021-10-08 NOTE — PROGRESS NOTES
Pt is to dc to home today by family transport. Pt has an appointment with Dr Edilia Soulier (surgeon) at 2pm today and will dc the ARU with wet to dry dressings. Updated home care orders have been provided to [facility]. Sw asked nurse to provide supplies for the dressing changes. Nba spoke with [facility] at Dr Delaney Reid office who confirmed they received the wound images and they are sufficient for Dr Delaney Reid needs. [facility] confirms that the office has wound vacs in the office and will be submitting for the pt. Nba faxed the previous documents for the unsubmitted [employer] request in the ARU. Nba spoke with the pt and his wife in the room regarding the above information. Pt's wife asked sw to call [facility] noting that they had called her with an issue. Spouse was unable to recall the issue. Nba spoke with the pharmacy and was told the pt had 2 scripts for lisinopril on file and 1 would be filled and the other filed. Nba shared with pt and his wife. No further needs are noted at this time.

## 2021-10-08 NOTE — ROUTINE PROCESS
SHIFT CHANGE NOTE FOR [facility]    Bedside and Verbal shift change report given to Cynthia Espinoza RN (oncoming nurse) by Theodora Grigsby RN (offgoing nurse). Report included the following information SBAR, Kardex, MAR and Recent Results. Situation:   Code Status: Full Code   Hospital Day: 20   Problem List:   Hospital Problems  Date Reviewed: 10/7/2021        Codes Class Noted POA    Essential hypertension (Chronic) ICD-10-CM: I10  ICD-9-CM: 401.9  Unknown Yes        Type 2 diabetes mellitus without complication, with long-term current use of insulin (HCC) (Chronic) ICD-10-CM: E11.9, Z79.4  ICD-9-CM: 250.00, V58.67  Unknown Yes    Overview Signed 9/20/2021 11:13 PM by Marty Rico MD     HbA1c (9/3/2021) = 10.8             Hyperthyroidism (Chronic) ICD-10-CM: E05.90  ICD-9-CM: 242.90  Unknown Yes        Vitamin D insufficiency (Chronic) ICD-10-CM: E55.9  ICD-9-CM: 268.9  9/19/2021 Yes    Overview Signed 9/20/2021 11:11 PM by Marty Rico MD     Vitamin D 25-Hydroxy (9/19/2021) = 24.0             Status post incision and drainage ICD-10-CM: Z98.890  ICD-9-CM: V45.89  9/9/2021 Yes    Overview Signed 9/20/2021 11:18 PM by Marty Rico MD     S/P Incision and drainage, with decompression of multiple subfascial layers, right foot; Wound debridement, skin, soft tissue, and muscle, right foot (9/9/2021 - Dr. Grover Pate)             [id number] ruled out by laboratory testing ICD-10-CM: Z20.822  ICD-9-CM: V01.79  9/8/2021 Yes    Overview Signed 9/20/2021 11:14 PM by Marty Rico MD     SARS-CoV-2 ([address] [place] [address]) (9/8/2021):  Not detected             History of incision and drainage ICD-10-CM: Z98.890  ICD-9-CM: V45.89  9/4/2021 Yes    Overview Signed 9/20/2021 11:18 PM by Marty Rico MD     S/P Incision and drainage of right foot (9/4/2021 - Dr. Ethan Litten.)             * (Principal) Diabetic infection of right foot [facility]) ICD-10-CM: E11.628, L08.9  ICD-9-CM: 250.80, 686.9  9/2/2021 Yes Impaired mobility and ADLs ICD-10-CM: Z74.09, Z78.9  ICD-9-CM: V49.89  9/2/2021 Yes              Background:   Past Medical History:   Past Medical History:   Diagnosis Date    COVID-19 ruled out by laboratory testing 9/8/2021    SARS-CoV-2 ([facility]) (9/8/2021):  Not detected    Diabetic infection of right foot ([address] 9/2/2021    Essential hypertension     Hyperthyroidism     Type 2 diabetes mellitus without complication, with long-term current use of insulin ([facility])     HbA1c (9/3/2021) = 10.8    Vitamin D insufficiency 9/19/2021    Vitamin D 25-Hydroxy (9/19/2021) = 24.0        Assessment:   Changes in Assessment throughout shift: No change to previous assessment     Patient has a central line: no Reasons if yes: na  Insertion date:na Last dressing date:na   Patient has Dennis Cath: no Reasons if yes: na   Insertion date:na  Shift dennis care completed: NO     Last Vitals:     Vitals:    10/06/21 1954 10/07/21 0730 10/07/21 1639 10/07/21 2044   BP: 120/72 137/75 (!) 158/88 (!) 141/75   Pulse: 77 81 94 91   Resp: 19 16 18 18   Temp: 97.4 °F (36.3 °C) 97.1 °F (36.2 °C) 97.4 °F (36.3 °C) 97.1 °F (36.2 °C)   SpO2: 100% 100% 98% 99%   Weight:       Height:            PAIN    Pain Assessment    Pain Intensity 1: 0 (10/08/21 0400) Pain Intensity 1: 2 (12/29/14 1105)    Pain Location 1: Leg Pain Location 1: Abdomen    Pain Intervention(s) 1: Medication (see MAR) Pain Intervention(s) 1: Medication (see MAR)  Patient Stated Pain Goal: 0 Patient Stated Pain Goal: 0  o Intervention effective: yes  o Other actions taken for pain:       Skin Assessment  Skin color Skin Color: Appropriate for ethnicity  Condition/Temperature Skin Condition/Temp: Dry, Warm  Integrity Skin Integrity: Wound (add Wound LDA)  Turgor Turgor: Non-tenting  Weekly Pressure Ulcer Documentation  Pressure  Injury Documentation: No Pressure Injury Noted-Pressure Ulcer Prevention Initiated  Wound Prevention & Protection Methods  Orientation of wound Orientation of Wound Prevention: Posterior  Location of Prevention Location of Wound Prevention: Buttocks, Sacrum/Coccyx  Dressing Present Dressing Present : No  Dressing Status    Wound Offloading Wound Offloading (Prevention Methods): Bed, pressure redistribution/air     INTAKE/OUPUT  Date 10/07/21 0700 - 10/08/21 0659 10/08/21 0700 - 10/09/21 0659   Shift 0700-1859 2611-1374 24 Hour Total 0700-1859 1900-0659 24 Hour Total   INTAKE   P.O. 1300  1300        P. O. 1300  1300      Shift Total(mL/kg) 1300(13.5)  1300(13.5)      OUTPUT   Urine(mL/kg/hr)           Urine Occurrence(s) 2 x 1 x 3 x      Stool           Stool Occurrence(s) 2 x 0 x 2 x      Shift Total(mL/kg)         NET 1300  1300      Weight (kg) 96 96 96 96 96 96       Recommendations:  1. Patient needs and requests:assist with toileting    2. Pending tests/procedures: na     3. Functional Level/Equipment: Partial (one person) /      Fall Precautions:   Fall risk precautions were reinforced with the patient; he was instructed to call for help prior to getting up. The following fall risk precautions were continued: bed/ chair alarms, door signage, yellow bracelet and socks as well as update of the Malcolm Medina tool in the patient's room. Willy Score: 3    HEALS Safety Check    A safety check occurred in the patient's room between off going nurse and oncoming nurse listed above.     The safety check included the below items  Area Items   H  High Alert Medications - Verify all high alert medication drips (heparin, PCA, etc.)   E  Equipment - Suction is set up for ALL patients (with yanker)  - Red plugs utilized for all equipment (IV pumps, etc.)  - WOWs wiped down at end of shift.  - Room stocked with oxygen, suction, and other unit-specific supplies   A  Alarms - Bed alarm is set for fall risk patients  - Ensure chair alarm is in place and activated if patient is up in a chair   L  Lines - Check IV for any infiltration  - Bal bag is empty if patient has a Bal   - Tubing and IV bags are labeled   S  Safety   - Room is clean, patient is clean, and equipment is clean. - Hallways are clear from equipment besides carts. - Fall bracelet on for fall risk patients  - Ensure room is clear and free of clutter  - Suction is set up for ALL patients (with yanker)  - Hallways are clear from equipment besides carts.    - Isolation precautions followed, supplies available outside room, sign posted     Dyllan Pedraza RN

## 2021-10-08 NOTE — DISCHARGE SUMMARY
[address]  [address] [place] [address], [address]     INPATIENT REHABILITATION  DISCHARGE SUMMARY    Name: Juli Jon MRN: [id number]   Age / Sex: 60 y.o. / male CSN: [id number]   YOB: 1965 Length of Stay: 20 days   Admit Date: 9/18/2021 Discharge Date: 10/8/2021       PRIMARY CARE PHYSICIAN: Tom Cuba MD      DISCHARGE DIAGNOSES:    Primary Rehabilitation Diagnosis  1. Impaired Mobility and ADLs  2. Diabetic infection of right foot  2. S/P Incision and drainage, with decompression of multiple subfascial layers, right foot; Wound debridement, skin, soft tissue, and muscle, right foot (9/9/2021 - Dr. Chantell Ramey)  3. S/P Incision and drainage of right foot (9/4/2021 - Dr. Ivon Aguilar.)    Comorbidities  Patient Active Problem List   Diagnosis Code    Diabetic infection of right foot ([email or web address] E11.628, L08.9    Essential hypertension I10    Vitamin D insufficiency E55.9    Impaired mobility and ADLs Z74.09, Z78.9    Type 2 diabetes mellitus without complication, with long-term current use of insulin ([email or web address] 75.) E11.9, Z79.4    COVID-19 ruled out by laboratory testing Z20.822    Status post incision and drainage Z98.890    History of incision and drainage Z98.890    Hyperthyroidism E05.90       CONSULTS CALLED: Wound Care Nurse      PROCEDURES DONE: None      BRIEF HISTORY  (from the history and physical prepared by Dr. Sophie Padron):   Juli Jon is a 60 y.o.  male who has history of diabetes mellitus hypertension hypothyroid patient was seen and [facility] ER often seen at primary care office.     Patient has history of diabetes mellitus on insulin seen by Dr. Mena Tucker. Patient was last seen in our office about 1 year ago for Rt foot cellulitis .  Patient reported that he was giving insulin pump by endocrinology but he has not been using it.     Patient started to have blisters on the right foot progressively was getting worse patient also had a swelling in the lower extremity. pt was sent to ER for further evaluation      Patient was transferred to [facility] had incision and drainage by podiatry started on broad-spectrum antibiotic. Antibiotic was adjusted and was discharged on Keflex 500 mg every 6 hour for 10 days. Patient had wound VAC order on discharge and nonweightbearing to the right foot     During his hospital stay patient was found to be hyponatremic and to have hypertensive emergency     Patient was evaluated by PT and OT recommended inpatient rehab patient [facility] 9/18/2021     Glucose level was running low he had Lantus 20 U Sc last night will decrease dose to 15 . Pt reported he missed dinner last night he ate small dinner   Pt also reported that  has been on DVT prophylaxis in [facility] 35 was stopped wednesday due to some bleeding from wound restarted again yesterday. Pt had generalized swelling and edema in the scrotal area with IV fluid and is improving now   Will monitor blood work up      [address]: Upon [facility] to the [facility], the patient underwent physical therapy, occupational therapy and speech therapy. The patient was able to actively participate in the rehabilitation activities and progressed well. On discharge, the patient was able to perform the following activities:    1.  Occupational Therapy    ON ADMISSION ON DISCHARGE   Eating  Functional Level: 5   Eating  Functional Level: 5     Grooming  Functional Level: 5   Grooming  Functional Level: 5     Bathing  Functional Level: 3   Bathing  Functional Level: 3     Upper Body Dressing  Functional Level: 4   Upper Body Dressing  Functional Level: 4     Lower Body Dressing  Functional Level: 2   Lower Body Dressing  Functional Level: 2     Toileting  Functional Level: 2   Toileting  Functional Level: 4     Toilet Transfers  Toilet Transfer Score: 4   Toilet Transfers  Toilet Transfer Score: 4     Tub /Shower Transfers  Tub/Shower Transfer Score: 0   Tub/Shower Transfers  Tub/Shower Transfer Score: 0       2. Physical Therapy    ON ADMISSION ON DISCHARGE   Wheelchair Mobility/Management  Able to Propel (ft): 75 feet  Functional Level:  (min A)  Curbs/Ramps Assist Required (FIM Score): 0 (Not tested)  Wheelchair Setup Assist Required : 2 (Maximal assistance)  Wheelchair Management: Manages left brake, Manages right brake (with supervision & vc's) Wheelchair Mobility/Management  Able to Propel (ft): 160 feet  Functional Level: 6  Curbs/Ramps Assist Required (FIM Score):  (SBA for ramps/incines; using B UE's & L LE)  Wheelchair Setup Assist Required : 4 (Minimal assistance)  Wheelchair Management: Manages left brake, Manages right brake, Manages right footrest     Gait  Amount of Assistance: 4 (Contact guard assistance)  Distance (ft): 35 Feet (ft)  Assistive Device: Walker, rolling, Gait belt Gait  Amount of Assistance: 5 (Supervision/setup)  Distance (ft): 150 Feet (ft)  Assistive Device: Walker, rolling, Gait belt     Balance-Sitting/Standing  Sitting - Static: Good (unsupported)  Sitting - Dynamic: Fair (occasional) (+)  Standing - Static: Fair (RW support)  Standing - Dynamic : Impaired Balance-Sitting/Standing  Sitting - Static: Good (unsupported)  Sitting - Dynamic: Good (unsupported)  Standing - Static: Fair  Standing - Dynamic : Impaired     Bed/Mat Mobility  Rolling Right : 5 (Stand-by assistance)  Rolling Left : 5 (Stand-by assistance)  Supine to Sit : 5 (Stand-by assistance)  Sit to Supine :  (CGA for R LE & mgm't of wound vac tube) Bed/Mat Mobility  Rolling Right : 6 (Modified independent)  Rolling Left : 6 (Modified independent)  Supine to Sit : 6 (Modified independent)  Sit to Supine : 6 (Modified independent)     Transfers  Transfer Type:  Other  Other: stand pivot with RW; and lateral squat pivot  Transfer Assistance : 4 (Minimal assistance) (with both methods; vc's fo hand placement w/ squat pivot)  Sit to Stand Assistance: Minimal assistance  Car Transfers: Minimum assistance (assist for R LE in/out of car & mgm't of door)  Car Type: car simulator in rehab Transfers  Transfer Type: Other  Other: stand step with RW  Transfer Assistance : 6 (Modified independent)  Sit to Stand Assistance: Modified independent  Car Transfers:  (Modified [place])  Car Type: Car Transfer Trainer     Steps or Stairs  Steps/Stairs Ambulated (#): 1 (4\" height step)  Level of Assist : 3 (Moderate assistance)  Rail Use: Both Steps or Stairs  Steps/Stairs Ambulated (#): 3 (4\")  Level of Assist :  (see curb training)  Rail Use: Both       3. Speech and Language Pathology    ON ADMISSION ON DISCHARGE   Comprehension (Native Language)  Primary Mode of Comprehension: Auditory  Score: 5 Comprehension (Native Language)  Primary Mode of Comprehension: Auditory  Score: 5     Expression (Native Language)  Primary Mode of Expression: Verbal  Score: 5   Expression (Native Language)  Primary Mode of Expression: Verbal  Score: 5     Social Interaction/Pragmatics  Score: 5 Social Interaction/Pragmatics  Score: 5     Problem Solving  Score: 5   Problem Solving  Score: 5     Memory  Score: 5 Memory  Score: 5       Legend:   7 - Independent   6 - Modified Independent   5 - [address] / Supervision / Set-up   4 - Minimum Assistance / Contact Guard Assistance   3 - Moderate Assistance   2 - Maximum Assistance   1 - Total Assistance / Dependent       ACUTE MEDICAL ISSUES ADDRESSED IN INPATIENT REHABILITATION FACILITY:     > Diabetic infection of right foot; S/P Incision and drainage, with decompression of multiple subfascial layers, right foot;  Wound debridement, skin, soft tissue, and muscle, right foot (9/9/2021 - Dr. Kati Boss); S/P Incision and drainage of right foot (9/4/2021 - Dr. Naresh Venegas.)   > Nonweightbearing on the right foot   > Wound care nurse consult for [facility] management   > Wound care instructions: Staff nurse to perform wound vac dressing changes Monday, Wednesday and fridays. Remove old dressing from right plantar foot wound and clean site  with wound spray. Apply skin prep  followed barrier ring then drapes. Cut hole over wound, apply adaptic  to wound bed then pack wound with black foam. Applied drapes  followed by diskus.  Settings 125mmhg continuous.    > On 9/28/2021, patient had completed the recommended treatment course of Cephalexin 500 mg PO q 6 hr    > On 10/5/2021, I spoke with Podiatry (Dr. Pilo Somers) via telephone and coordinated care of the patient. He recommended that on discharge on Friday (10/8/2021), that instead of dischargin im with a WoundVac, to instead discharge the patient with a wet to dry dressing since he will be inspecting the wound at his office on the day of discharge.  He will put a WoundVac at his office after he inspects the wound.    > On 10/6/2021, pictures of the patient's wound was taken by Wound Care nurse and these were sent to the office of Dr. Pilo Somers via fax     > Essential hypertension   > On 10/2/2021, increased Lisinopril from 20 mg to 30 mg PO once daily (9AM)   > On 10/8/2021, increased Lisinopril from 30 mg to 40 mg PO once daily (9AM)              > During the patient's stay at the ARU, the patient was given Hydralazine 25 mg PO q 8 hr PRN for SBP greater than 170 mmHg   > Continue:    > Lisinopril 40 mg PO once daily (9AM)    > Metoprolol tartrate 25 mg PO q 12 hr (9AM, 9PM)    > Hyperthyroidism   > Continue Methimazole 10 mg PO once daily    > Type 2 diabetes mellitus without complication, with long-term current use of insulin   > HbA1c (9/3/2021) = 10.8   > On 9/25/2021, increased Insulin glargine from 15 units to 18 units SC q HS   > On 9/26/2021, started Insulin lispro 2 units SC TID AC   > On 9/28/2021, decreased Insulin glargine from 18 units to 16 units SC q HS   > On 9/29/2021, decreased insulin glargine from 16 units to 14 units SC q HS   > On 10/1/2021, changed Insulin glargine from 14 units SC q HS to 12 units once daily before breakfast   > On 10/2/2021:    > Increased Insulin glargine from 12 units to 15 units once daily before breakfast    > Increased Insulin lispro from 2 units to 4 units SC TID AC   > On 10/3/2021:    > Increased Insulin glargine from 15 units to 18 units once daily before breakfast --> Changed to 10 units SC BID (8AM, 8PM)    > Decrease Insulin lispro from 4 units to 3 units SC TID AC   > On 10/5/2021, decreased Insulin lispro from 3 units to 2 units SC TID AC   > On 10/6/2021:    > Discontinued Insulin lispro sliding scale SC TID AC only    > Increased Insulin lispro from 2 units to 3 units SC TID AC   > On 10/8/2021, decreased Insulin glargine from 10 units SC BID (8AM, 8PM) to 10 units SC q HS              > During the patient's stay at the ARU, the patient was given:     > Insulin glargine 10 units SC q HS    > Insulin lispro 3 units SC TID AC   > On discharge, patient opted to resume his home insulin regimen as follows:    > Insulin glargine 20 units SC q HS    > Insulin lispro sliding scale SC TID AC only    > Vitamin D insufficiency   > Vitamin D 25-Hydroxy (9/19/2021) = 24.0   > On 9/20/2021, started Cholecalciferol 5,000 units PO once daily              > During the patient's stay at the ARU, the patient was given Cholecalciferol 5,000 units PO once daily   > Patient was discharged on Cholecalciferol 2,000 units PO once daily    > COVID-19 ruled out by laboratory testing   > SARS-CoV-2 ([facility]) (9/8/2021):  Not detected   > Prior to admission and upon admission to the ARU, patient did not have any fever, desaturation, cough or difficulty breathing     > Analgesia   > Continue:    > Acetaminophen 650 mg PO q 4 hr PRN for pain level 4/10 or lesser    > Percocet 5/325 1 tab PO q 6 hr PRN for pain level 5/10 or greater       MEDICATIONS ON DISCHARGE:    Current Discharge Medication List      START taking these medications    Details   acetaminophen (TYLENOL) 325 mg tablet Take 2 Tablets by mouth every four (4) hours as needed (for fever or pain level 4/10 or lesser). Indications: fever, pain  Qty: 30 Tablet, Refills: 0  Start date: 10/7/2021    Associated Diagnoses: Diabetic infection of right foot ([facility] Status post incision and drainage; History of incision and drainage      cholecalciferol (VITAMIN D3) (2,000 UNITS /50 MCG) cap capsule Take 1 Capsule by mouth daily. Indications: prevention of vitamin D deficiency  Qty: 30 Capsule, Refills: 0  Start date: 10/7/2021    Associated Diagnoses: Vitamin D insufficiency      metoprolol tartrate (LOPRESSOR) 50 mg tablet Take 1 Tablet by mouth every twelve (12) hours. Indications: high blood pressure  Qty: 60 Tablet, Refills: 0  Start date: 10/8/2021    Associated Diagnoses: Essential hypertension      oxyCODONE-acetaminophen (PERCOCET) 5-325 mg per tablet Take 1 Tablet by mouth every six (6) hours as needed (for pain level 5/10 or greater) for up to 5 days. Max Daily Amount: 4 Tablets. Indications: pain  Qty: 20 Tablet, Refills: 0  Start date: 10/7/2021, End date: 10/12/2021    Associated Diagnoses: Diabetic infection of right foot ([facility] Status post incision and drainage; History of incision and drainage         CONTINUE these medications which have CHANGED    Details   lisinopriL (PRINIVIL, ZESTRIL) 40 mg tablet Take 1 Tablet by mouth daily. Indications: high blood pressure  Qty: 30 Tablet, Refills: 0  Start date: 10/7/2021    Associated Diagnoses: Type 2 diabetes mellitus without complication, with long-term current use of insulin ([facility] Essential hypertension         CONTINUE these medications which have NOT CHANGED    Details   insulin glargine (Lantus Solostar U-100 Insulin) 100 unit/mL (3 mL) inpn 20 Units by SubCUTAneous route nightly.       insulin lispro (HUMALOG) 100 unit/mL injection by SubCUTAneous route Before breakfast, lunch, and dinner. Insulin sliding scale      methimazole (TAPAZOLE) 10 mg tablet Take 10 mg by mouth daily. Insulin Needles, Disposable, (NOVOFINE 30) 30 x 1/3 \" by Does Not Apply route. STOP taking these medications       propranoloL (INDERAL) 10 mg tablet Comments:   Reason for Stopping:               DISCHARGE VITAL SIGNS:  Visit Vitals  /75 (BP 1 Location: Right upper arm, BP Patient Position: Sitting)   Pulse 88   Temp 98.7 °F (37.1 °C)   Resp 16   Ht 5' 11\" (1.803 m)   Wt 96 kg (211 lb 9.6 oz)   SpO2 100%   BMI 29.51 kg/m²       DISCHARGE PHYSICAL EXAMINATION:  GENERAL SURVEY: Patient is awake, alert, oriented x 3, sitting comfortably on the chair, not in acute respiratory distress. HEENT: pink palpebral conjunctivae, anicteric sclerae, no nasoaural discharge, moist oral mucosa  NECK: supple, no jugular venous distention, no palpable lymph nodes  CHEST/LUNGS: symmetrical chest expansion, good air entry, clear breath sounds  HEART: adynamic precordium, good S1 S2, no S3, regular rhythm, no murmurs  ABDOMEN: flat, bowel sounds appreciated, soft, non-tender  EXTREMITIES: (+) right foot/ankle wrapped in ACE wrap, pink nailbeds, Grade 1 edema on the right lower leg, full and equal pulses, no calf tenderness   NEUROLOGICAL EXAM: The patient is awake, alert and oriented x 3, able to answer questions fairly appropriately, able to follow 1 and 2 step commands. Able to tell time from the wall clock. Cranial nerves II-XII are grossly intact. No gross sensory deficit. Motor strength is 4+/5 on BUE and LLE, 4- to 4/5 on the RLE. CONDITION ON DISCHARGE: Stable. DISPOSITION: Patient clinically improved and was discharged to home with home health physical therapy, occupational therapy and skilled nursing.  The patient is temporarily homebound secondary to functional deficits due to Diabetic infection of right foot; S/P Incision and drainage, with decompression of multiple subfascial layers, right foot; Wound debridement, skin, soft tissue, and muscle, right foot (9/9/2021 - Dr. Janes Estrada); S/P Incision and drainage of right foot (9/4/2021 - Dr. Sona Thorpe.). The patient can ambulate using a rolling walker (see above). The patient would benefit from continued skilled physical therapy in order to improve independent functional mobility within the home with use of least restrictive device. The patient would also benefit from continued skilled occupational therapy in order to improve self care and functional mobility within the home with use of least restrictive device. Short-term skilled nursing is needed for medication reconciliation, disease education and wet-to-dry dressing for right foot once daily until WoundVac is available (WoundVac to be ordered and managed by Dr. Janes Estrada). FOLLOW-UP RECOMMENDATIONS:   Follow-up Information     Follow up With Specialties Details Why [facility]  [address] [facility] [address] [address] [facility]. DME Services   [address] [facility] [address]    Mauri Prieto MD Family Medicine On 10/18/2021 Patient has an appointment scheduled with PCP Dr. Quinton Day on October 18, 2021 @ 2:50pm. [address] Kaitlin Mason DPM Podiatry On 10/8/2021 Patient has an appointment scheuled with Podiatry Dr. Robert Lai on October 8, 2021 @ 2:00pm. [id number]  [address] [address] [place] [address]  [phone]            OTHER INSTRUCTIONS:  1. Diet.    > Specifications: 3 carb choices (45 gm/meal)   > Solids (consistency): Regular   > Liquids (consistency): Thin   > Fluid restriction: None   2. Activity. As tolerated. 3. Safety / fall precautions. 4. Weightbearing status. Nonweightbearing on the right foot. TIME SPENT ON DISCHARGE ACTIVITIES: 32 minutes.       Signed: Lissy Noonan MD    10/8/2021

## 2021-10-08 NOTE — FACE TO FACE
[facility] PHYSICAL REHABILITATION  [address] [place] [address], [address]    [address] [address]    Name: Maye Echeverria Age / Sex: 60 y.o. / male   CSN: [id number] MRN: [id number]   [facility] Date: 9/18/2021 Discharge Date: 10/8/2021     Primary Care Provider: Denilson Pierce MD      Primary Rehabilitation Diagnosis  1. Impaired Mobility and ADLs  2. Diabetic infection of right foot  2. S/P Incision and drainage, with decompression of multiple subfascial layers, right foot; Wound debridement, skin, soft tissue, and muscle, right foot (9/9/2021 - Dr. Zachery Leyden)  3. S/P Incision and drainage of right foot (9/4/2021 - Dr. Karina Tobias)    Comorbidities  Patient Active Problem List   Diagnosis Code    [facility] of [facility] E11.628, L08.9    Essential hypertension I10    Vitamin D insufficiency E55.9    Impaired mobility and ADLs Z74.09, Z78.9    Type 2 diabetes mellitus without complication, with long-term current use of insulin ([facility] E11.9, Z79.4    COVID-19 ruled out by laboratory testing Z20.822    Status post incision and drainage Z98.890    History of incision and drainage Z98.890    Hyperthyroidism E05.90       History of the Present Illness (from the history and physical prepared by Dr. Yan Dorman):   Maye Echeverria is a 60 y.o.  male who has history of diabetes mellitus hypertension hypothyroid patient was seen and [facility] ER often seen at primary care office. Patient has history of diabetes mellitus on insulin seen by Dr. Diana Meyer. Patient was last seen in our office about 1 year ago for Rt foot cellulitis . Patient reported that he was giving insulin pump by endocrinology but he has not been using it. Patient started to have blisters on the right foot progressively was getting worse patient also had a swelling in the lower extremity. pt was sent to ER for further evaluation     Patient was transferred to [facility] had incision and drainage by podiatry started on broad-spectrum antibiotic. Antibiotic was adjusted and was discharged on Keflex 500 mg every 6 hour for 10 days. Patient had wound VAC order on discharge and nonweightbearing to the right foot    During his hospital stay patient was found to be hyponatremic and to have hypertensive emergency    Patient was evaluated by PT and OT recommended inpatient rehab patient was admitted to [facility] view inpatient rehab 9/18/2021    Glucose level was running low he had Lantus 20 U Sc last night will decrease dose to 15 . Pt reported he missed dinner last night he ate small dinner   Pt also reported that  has been on DVT prophylaxis in [facility] was stopped wednesday due to some bleeding from wound restarted again yesterday. Pt had generalized swelling and edema in the scrotal area with IV fluid and is improving now   Will monitor blood work up      Past Medical History:  Past Medical History:   Diagnosis Date    COVID-19 ruled out by laboratory testing 9/8/2021    SARS-CoV-2 ([address] [place] [address]) (9/8/2021): Not detected    Diabetic infection of right foot ([facility] 9/2/2021    Essential hypertension     Hyperthyroidism     Type 2 diabetes mellitus without complication, with long-term current use of insulin ([facility])     HbA1c (9/3/2021) = 10.8    Vitamin D insufficiency 9/19/2021    Vitamin D 25-Hydroxy (9/19/2021) = 24.0       Past Surgical History:  Past Surgical History:   Procedure Laterality Date    HX OTHER SURGICAL      surgery on left eye    HX OTHER SURGICAL Right 09/04/2021    S/P Incision and drainage of right foot (9/4/2021 - Dr. Cynthia Gama.)    HX OTHER SURGICAL Right 09/09/2021    S/P Incision and drainage, with decompression of multiple subfascial layers, right foot;  Wound debridement, skin, soft tissue, and muscle, right foot (9/9/2021 - Dr. Javon Granados)       Medications on Discharge:    Current Discharge Medication List START taking these medications    Details   acetaminophen (TYLENOL) 325 mg tablet Take 2 Tablets by mouth every four (4) hours as needed (for fever or pain level 4/10 or lesser). Indications: fever, pain  Qty: 30 Tablet, Refills: 0  Start date: 10/7/2021    Associated Diagnoses: Diabetic infection of right foot ([facility] Status post incision and drainage; History of incision and drainage      cholecalciferol (VITAMIN D3) (2,000 UNITS /50 MCG) cap capsule Take 1 Capsule by mouth daily. Indications: prevention of vitamin D deficiency  Qty: 30 Capsule, Refills: 0  Start date: 10/7/2021    Associated Diagnoses: Vitamin D insufficiency      metoprolol tartrate (LOPRESSOR) 50 mg tablet Take 1 Tablet by mouth every twelve (12) hours. Indications: high blood pressure  Qty: 60 Tablet, Refills: 0  Start date: 10/8/2021    Associated Diagnoses: Essential hypertension      oxyCODONE-acetaminophen (PERCOCET) 5-325 mg per tablet Take 1 Tablet by mouth every six (6) hours as needed (for pain level 5/10 or greater) for up to 5 days. Max Daily Amount: 4 Tablets. Indications: pain  Qty: 20 Tablet, Refills: 0  Start date: 10/7/2021, End date: 10/12/2021    Associated Diagnoses: Diabetic infection of right foot ([facility] Status post incision and drainage; History of incision and drainage         CONTINUE these medications which have CHANGED    Details   lisinopriL (PRINIVIL, ZESTRIL) 40 mg tablet Take 1 Tablet by mouth daily. Indications: high blood pressure  Qty: 30 Tablet, Refills: 0  Start date: 10/7/2021    Associated Diagnoses: Type 2 diabetes mellitus without complication, with long-term current use of insulin ([facility] Essential hypertension         CONTINUE these medications which have NOT CHANGED    Details   insulin glargine (Lantus Solostar U-100 Insulin) 100 unit/mL (3 mL) inpn 20 Units by SubCUTAneous route nightly. insulin lispro (HUMALOG) 100 unit/mL injection by SubCUTAneous route Before breakfast, lunch, and dinner. Insulin sliding scale      methimazole (TAPAZOLE) 10 mg tablet Take 10 mg by mouth daily. Insulin Needles, Disposable, (NOVOFINE 30) 30 x 1/3 \" by Does Not Apply route. STOP taking these medications       propranoloL (INDERAL) 10 mg tablet Comments:   Reason for Stopping:               Condition on Discharge: Stable. Ambulation Gait  Amount of Assistance: 5 (Supervision/setup)  Distance (ft): 150 Feet (ft)  Assistive Device: Walker, rolling, Gait belt     Wheelchair Mobility Wheelchair Mobility/Management  Able to Propel (ft): 160 feet  Functional Level: 6  Curbs/Ramps Assist Required (FIM Score):  (SBA for ramps/incines; using B UE's & L LE)  Wheelchair Setup Assist Required : 4 (Minimal assistance)  Wheelchair Management: Manages left brake, Manages right brake, Manages right footrest         Disposition: Patient clinically improved and was discharged to home with home health physical therapy, occupational therapy and skilled nursing. The patient is temporarily homebound secondary to functional deficits due to Diabetic infection of right foot; S/P Incision and drainage, with decompression of multiple subfascial layers, right foot; Wound debridement, skin, soft tissue, and muscle, right foot (9/9/2021 - Dr. Javon Granados); S/P Incision and drainage of right foot (9/4/2021 - Dr. Cynthia Gama.). The patient can ambulate using a rolling walker (see above). The patient would benefit from continued skilled physical therapy in order to improve independent functional mobility within the home with use of least restrictive device. The patient would also benefit from continued skilled occupational therapy in order to improve self care and functional mobility within the home with use of least restrictive device.  Short-term skilled nursing is needed for medication reconciliation, disease education and wet-to-dry dressing for right foot once daily until WoundVac is available (WoundVac to be ordered and managed by Dr. Zachery Leyden). I certify that this patient is homebound, that is: 1) patient requires the use of a walker device, special transportation, or assistance of another to leave the home; or 2) patient's condition makes leaving the home medically contraindicated; and 3) patient has a normal inability to leave the home and leaving the home requires considerable and taxing effort. Patient may leave the home for infrequent and short duration for medical reasons, and occasional absences for non-medical reasons. Homebound status is due to the following functional limitations: Patient with strength deficits limiting the performance of all ADL's without caregiver assistance or the use of an assistive device. Due to the abovementioned data, I certify that the patient needs intermittent Skilled Nursing, Physical Therapy and Occupational Therapy. I will NOT be following this patient in the Community and Dr. Yan Dorman will be responsible for signing the [facility] of Care. In compliance with the Affordable Care Act, I certify that this patient was managed by me during this hospitalization and that I had a Face-to-Face Encounter that meets the physician Face-to-Face Encounter requirements.       Signed:    Richa Ortiz MD    October 7, 2021

## 2021-10-09 ENCOUNTER — HOME CARE VISIT (OUTPATIENT)
Dept: SCHEDULING | Facility: HOME HEALTH | Age: 56
End: 2021-10-09
Payer: COMMERCIAL

## 2021-10-09 PROCEDURE — G0299 HHS/HOSPICE OF RN EA 15 MIN: HCPCS

## 2021-10-09 PROCEDURE — 400013 HH SOC

## 2021-10-11 ENCOUNTER — HOME CARE VISIT (OUTPATIENT)
Dept: HOME HEALTH SERVICES | Facility: HOME HEALTH | Age: 56
End: 2021-10-11
Payer: COMMERCIAL

## 2021-10-11 NOTE — PROGRESS NOTES
[x] Psychology  [] Social Work [] Recreational Therapy    INTERVENTION  UNITS/TIME OF SERVICE   Assessment    Supportive Counseling  October 8, 2021   Orientation    Discharge Planning    Resource Linkage              Progress/Current Status    Late entry for individual support  with patient on ARU on above referenced date and shortly before his scheduled discharge to home. Patient is obviously being well supported by his immediate family in this transition. In fact, in particular, patient has had excellent support from his spouse throughout his hospitalization on ARU. Patient is feeling gratified with his progress to date and obviously remains highly motivated to continue in his recovery. He expressed much appreciation for the support that he has had from entire [place] [address] staff, as well. He understands that he will have to set realistic goals for himself in his continued recovery and not expect to return to premorbid levels of function sooner than is possible. Yet, he seems determined to regain his functional independence and this positive thinking will serve him well. Patient has been advised about safety and pace on his return to home, and he fully understands the necessity for same.   No distressed thoughts nor feelings expressed at this time and he is pleased for discharge from hospital.    Solange Chiu, THE [facility] 10/11/2021 9:26 AM

## 2021-10-12 ENCOUNTER — HOME CARE VISIT (OUTPATIENT)
Dept: HOME HEALTH SERVICES | Facility: HOME HEALTH | Age: 56
End: 2021-10-12
Payer: COMMERCIAL

## 2021-10-12 VITALS
RESPIRATION RATE: 16 BRPM | TEMPERATURE: 97.7 F | HEART RATE: 83 BPM | OXYGEN SATURATION: 99 % | DIASTOLIC BLOOD PRESSURE: 78 MMHG | SYSTOLIC BLOOD PRESSURE: 128 MMHG

## 2021-10-13 ENCOUNTER — HOME CARE VISIT (OUTPATIENT)
Dept: HOME HEALTH SERVICES | Facility: HOME HEALTH | Age: 56
End: 2021-10-13
Payer: COMMERCIAL

## 2021-10-13 NOTE — HOME HEALTH
Skilled services/Home bound verification:     Skilled Reason for admission/summary of clinical condition:  diabetic infection of right foot, s/p incision and drainage, wound debridement requiring disease process teaching and medication management, wound care. This patient is homebound for the following reasons Requires considerable and taxing effort to leave the home  and Requires the assistance of 1 or more persons to leave the home . Caregiver: spouse. Caregiver assists with iadls, adls, meal prep, med management, taking to md appointments. Medications reconciled and all medications are available in the home this visit. pt not taking tylenol. pt not taking tapazole, unsure if needed asking PCP today at appointment. SN requested sliding scale for humalog but patient reporting he does not have it, that \"his doctor knows it\"  The following education was provided regarding medications, medication interactions, and look alike medications (specify): reviewed side effects, purposes, dosage, frequencies. Medications  are effective at this time. High risk medication teaching regarding anticoagulants, hyperglycemic agents or opiod narcotics performed (specify) insulin, percocet-reviewed side effects, purposes, dosage, frequencies. MD notified of any discrepancies/medication interactions- NA. Home health supplies by type and quantity ordered/delivered this visit include: awaiting wound orders then supplies will be ordered by primary clinician. Patient education provided this visit to include: patient/cg instructed to monitor for edema/increase in edema, to elevate extremity when edema occurs and to notify md if edema exceeds normal limits for patient, edema noted to right foot, pt reporting baseline since debridement. patient made aware to monitor for s/s of infection [increased swelling, increased redness around site, increased pain, foul smelling drainage, fever] aware who to report to/when.  no s/s of infection noted. pt aware to keep dressing clean, dry and intact as ordered. right foot wound dressing is clean dry and intact, wound not assessed on visit due to pt/cg reporting MD stated they wanted dressing in place until MD follow up on 10/12- patient to have possible skin graft placed on 10/14- will await orders for wound care. encouraged patient to get three nutritional meals daily and to stay hydrated, drink plenty of fluids. reviewed low sodium diet- patient aware to limit sodium, no added sodium to diet. reviewed foods to avoid, how to order foods when eating out, how to read nutrition labels and measure sodium intake. discussed importance of monitoring blood pressure daily and recording for review, discussed hypertension, causes/long term effects of uncontrolled hypertension. pt instructed to follow with diabetic diet- monitoring sugar intake, limiting foods with high sugar content. Discussed dietary adjustments such as: Reduce portion sizes, Limit daily carbohydrate intake to not greater than 45-60 grams per meal for a total of <180 grams of carbohydrate daily, Avoid concentrated carbohydrates such as soft drinks, sweet tea, and sweet treats. Increase physical activity along with strength training as tolerated to facilitate weight loss and build muscle mass. pt instructed to continue monitoring BS and to ensure BS levels are within good range to ensure proper healing. pt instructed to follow a high protein diet for healing- to try to get 90g protein daily. discussed fall precautions in detail- having lighted hallways, removing throw rugs, monitoring medication that may alter mental status. pt denies any questions or concerns at this time. Patient/caregiver degree of understanding:good    Home exercise program/Homework provided: patient instructed to perform sob hep 4-5 x daily and prn for sob, to promote lung expansion.  pt also encouraged to use ICS q 2 hours and to perform sob hep during therapy. Pt/Caregiver instructed on plan of care and are agreeable to plan of care at this time. Physician Dr. Chyna Simpson notified of patient admission to home health and plan of care including anticipated frequency of 1w1, 2w3, 1w5, 4 prn and treatments/interventions/modalities of wound care. Discharge planning discussed with patient and caregiver. Discharge planning as follows: Patient will be discharged once education has completed, patient is medically stable and pt/cg are able to independently manage wound care/ wound has healed or no longer requires skilled care. Pt/Caregiver did verbalize understanding of discharge planning. Next MD appointment 10/12 (date) with Dr. Celia Becerra. Patient/caregiver encouraged/instructed to keep appointment as lack of follow through with physician appointment could result in discontinuation of home care services for non-compliance.

## 2021-10-15 ENCOUNTER — HOME CARE VISIT (OUTPATIENT)
Dept: HOME HEALTH SERVICES | Facility: HOME HEALTH | Age: 56
End: 2021-10-15
Payer: COMMERCIAL

## 2021-10-16 ENCOUNTER — HOME CARE VISIT (OUTPATIENT)
Dept: SCHEDULING | Facility: HOME HEALTH | Age: 56
End: 2021-10-16
Payer: COMMERCIAL

## 2021-10-16 VITALS
RESPIRATION RATE: 17 BRPM | SYSTOLIC BLOOD PRESSURE: 120 MMHG | TEMPERATURE: 97.4 F | OXYGEN SATURATION: 97 % | DIASTOLIC BLOOD PRESSURE: 70 MMHG | HEART RATE: 78 BPM

## 2021-10-16 PROCEDURE — G0151 HHCP-SERV OF PT,EA 15 MIN: HCPCS

## 2021-10-16 NOTE — HOME HEALTH
Summary of clinical condition: Emmanuel Mcmillan is a 60 y.o.  male who has history of diabetes mellitus hypertension hypothyroid patient was seen and [facility] ER often seen at primary care office. Patient has history of diabetes mellitus on insulin seen by Dr. Nat Harper. Patient was last seen in our office about 1 year ago for Rt foot cellulitis . Patient reported that he was giving insulin pump by endocrinology but he has not been using it. Patient started to have blisters on the right foot progressively was getting worse patient also had a swelling in the lower extremity. pt was sent to ER for further evaluation         Patient was transferred to [facility] had incision and drainage by podiatry started on broad-spectrum antibiotic. Antibiotic was adjusted and was discharged on Keflex 500 mg every 6 hour for 10 days. Patient had wound VAC order on discharge and nonweightbearing to the right foot         During his hospital stay patient was found to be hyponatremic and to have hypertensive emergency         Patient was evaluated by PT and OT recommended inpatient rehab patient was admitted to [facility] view inpatient rehab 9/18/2021     Medications reconciliation completed. Pt/Caregiver instructed on plan of care and are agreeable to plan of care at this time. Plan of care and admission to home health status called to attending physician: Dr. Jennifer Granger    Discharge planning discussed with patient and caregiver. Discharge planning as follows: dc at this time per patient request .  Pt/Caregiver did verbalize understanding.   PMHx: Diabetic infection of right foot ([facility] E11.628, L08.9    Essential hypertension I10    Vitamin D insufficiency E55.9    Impaired mobility and ADLs Z74.09, Z78.9    Type 2 diabetes mellitus without complication, with long-term current use of insulin ([facility] E11.9, Z79.4    COVID-19 ruled ou t by laboratory testing Z20.822   Stephanie Status post incision and drainage Z98.890    History of incision and drainage Z98.890    Hyperthyroidism   S: no complaint of pain   O:Patients Goals= Be able to go back to PLOF  Wound/Incision: location, description, drainage: has intact wound vac on R foot  PLOF: Lives in a 1 level house with spouse, has a ramp to enter main floor, prior to hospitalization, he was independent with ADL's, IADL's and required no assistive device during gait and was working full time without any restrictions   STRENGTH R hip and knee flexor and extensor 4-/5, L hip and knee flexor and extensor 5/5  BALANCE Fair+ in standing balance, Good in sitting balance   GAIT Patient ambulated with NWB on RLE using RW >30 feet with supervision   BED MOBILITY Patient is stand by assist with bed mobility   TRANSFERS Patient was able to demo stand by assist with bed, chair and toilet transfers   A: PT evaluation completed with the presence of  spouse who is the primary CG, POC established, Med rec done, HEP established  P:Home Safety eval/recommendations: Home health physical therapy initial evaluation performed. Patient has refused further PT at this time stating that he already knows HEP to do provided at PT rehab and is doing it everyday.  Patient has refused further care for PT at this time

## 2021-10-16 NOTE — Clinical Note
Therapy Functional Score Assessment  Question   Score   Grooming        Upper Dressing 1   Lower Dressing 1    Bathing  3    Toilet Transfer  1   Transfer  1          Ambulation  3   Dyspnea                     2   Pain Interfering with activity 0  Est number therapy visits      1

## 2021-10-17 ENCOUNTER — HOME CARE VISIT (OUTPATIENT)
Dept: HOME HEALTH SERVICES | Facility: HOME HEALTH | Age: 56
End: 2021-10-17
Payer: COMMERCIAL

## 2021-10-18 ENCOUNTER — HOME CARE VISIT (OUTPATIENT)
Dept: SCHEDULING | Facility: HOME HEALTH | Age: 56
End: 2021-10-18
Payer: COMMERCIAL

## 2021-10-18 VITALS
HEART RATE: 89 BPM | TEMPERATURE: 98.1 F | OXYGEN SATURATION: 99 % | SYSTOLIC BLOOD PRESSURE: 115 MMHG | RESPIRATION RATE: 18 BRPM | DIASTOLIC BLOOD PRESSURE: 72 MMHG

## 2021-10-18 PROCEDURE — G0300 HHS/HOSPICE OF LPN EA 15 MIN: HCPCS

## 2021-10-18 NOTE — HOME HEALTH
Skilled reason for visit:Skilled nursing assessment, wound care and medication review and education    Caregiver involvement: wife is in the home to assist.    Medications reviewed and all medications are available in the home this visit. The following education was provided regarding medications, medication interactions, and look alike medications (specify):Metoprolol should be taken with a meal and at the same time each day. Medications  are effective at this time.       Home health supplies by type and quantity ordered/delivered this visit include: Supplies available in the home    Patient education provided this visit: Patient educated on infection control and care for wound vac    Skilled Care Performed this visit: Skilled nursing assessment, wound care and medication review and education    Patient's Progress towards personal goals: Patient continues to work toward discharge by complying with Physicians orders    Home exercise program: sn instructed patient to perform deep breathing exercises, 5-6 breaths 5 x daily to promote air exchange and prevent pneumonia     Continued need for the following skills: Nursing    Plan for next visit: Skilled nursing assessment, wound care and medication review and education    Patient and/or caregiver notified and agrees to changes in the Plan of Care N/A      The following discharge planning was discussed with the pt/caregiver: Patient to discharge when goals are met

## 2021-10-19 ENCOUNTER — HOME CARE VISIT (OUTPATIENT)
Dept: HOME HEALTH SERVICES | Facility: HOME HEALTH | Age: 56
End: 2021-10-19
Payer: COMMERCIAL

## 2021-10-20 ENCOUNTER — HOME CARE VISIT (OUTPATIENT)
Dept: SCHEDULING | Facility: HOME HEALTH | Age: 56
End: 2021-10-20
Payer: COMMERCIAL

## 2021-10-20 PROCEDURE — G0300 HHS/HOSPICE OF LPN EA 15 MIN: HCPCS

## 2021-10-23 VITALS
RESPIRATION RATE: 16 BRPM | SYSTOLIC BLOOD PRESSURE: 128 MMHG | DIASTOLIC BLOOD PRESSURE: 72 MMHG | OXYGEN SATURATION: 98 % | HEART RATE: 72 BPM

## 2021-10-25 ENCOUNTER — HOME CARE VISIT (OUTPATIENT)
Dept: SCHEDULING | Facility: HOME HEALTH | Age: 56
End: 2021-10-25
Payer: COMMERCIAL

## 2021-10-25 PROCEDURE — G0300 HHS/HOSPICE OF LPN EA 15 MIN: HCPCS

## 2021-10-26 NOTE — HOME HEALTH
WOUND CARE  VISIT/wound vac  SN reason for visit: education/teaching related to medication mgt ,disease mgt and assessment - HEALING WOUND  patient made aware to monitor for s/s of infection (not observed) [increased swelling, increased redness around site, increased pain, foul smelling drainage, fever] aware who to report to/when. Caregiver involvement: Patient's cg is available at all times for assistance with iadls, adls, meal prep, medication management, taking to md appointments. Medications reconciled . The following education was provided regarding medications, medication interactions, and look a like medications. Home health supplies by type and quantity ordered/delivered this visit include: n/a  Patient education provided this visit: assessment, teaching  Reviewed medications and care plan for changes. patient/cg to continue to take medications as prescribed. patient aware to monitor for effectiveness and to notify staff of any adverse reactions to medications/any changes to medication regimen. reviewed side effects, purposes, dosage, frequencies  patient encouraged to monitor for increase in pain and to continue with current pain management and to notify staff/md if pain becomes excrutiating/intolerable. Patient is aware of fall risk. Reviewed safet precautions with patient. Educated on ambulation and or bedbound safety if applicable. INSTRUCTED PATIENT AND CG THAT SHOULD ANY NEEDS OR CONCERNS ARISE TO FIRST CALL OUR OFFICE, OR THE DR'S OFFICE  OR GO TO AN URGENT CARE CENTER AND NOT TO THE ED FOR NON-LIFE THREATENING EVENTS. IF IT IS LIFE THREATENING THEN CALL 911 OR GO TO THE CLOSEST ER. Progress toward goals- continuing to work towards goals as reviewed in [address] with patient on each visit  Home exercise program:    activity as tolerated, trying to get physical activity 4-5 x weekly.  stopping activity if causing shortness of breath or chest pain, dizziness or weakness Continued need for the following skills: Nursing, The following discharge planning was discussed with the pt/caregiver: Patient will be discharged once education has completed, and patient is medically stable.

## 2021-10-27 ENCOUNTER — HOME CARE VISIT (OUTPATIENT)
Dept: HOME HEALTH SERVICES | Facility: HOME HEALTH | Age: 56
End: 2021-10-27
Payer: COMMERCIAL

## 2021-11-01 VITALS
RESPIRATION RATE: 16 BRPM | TEMPERATURE: 97.7 F | HEART RATE: 67 BPM | SYSTOLIC BLOOD PRESSURE: 132 MMHG | DIASTOLIC BLOOD PRESSURE: 71 MMHG | OXYGEN SATURATION: 98 %

## 2021-11-01 NOTE — HOME HEALTH
SN reason for visit: education/teaching related to medication mgt ,disease mgt and assessment -Wound vac application  patient made aware to monitor for s/s of infection (not observed) [increased swelling, increased redness around site, increased pain, foul smelling drainage, fever] aware who to report to/when. Caregiver involvement: Patient's cg is available at all times for assistance with iadls, adls, meal prep, medication management, taking to md appointments. Medications reconciled . The following education was provided regarding medications, medication interactions, and look a like medications. Home health supplies by type and quantity ordered/delivered this visit include: n/a  Patient education provided this visit: assessment, teaching  Reviewed medications and care plan for changes. patient/cg to continue to take medications as prescribed. patient aware to monitor for effectiveness and to notify staff of any adverse reactions to medications/any changes to medication regimen. reviewed side effects, purposes, dosage, frequencies  patient encouraged to monitor for increase in pain and to continue with current pain management and to notify staff/md if pain becomes excrutiating/intolerable. Patient is aware of fall risk. Reviewed safet precautions with patient. Educated on ambulation and or bedbound safety if applicable. INSTRUCTED PATIENT AND CG THAT SHOULD ANY NEEDS OR CONCERNS ARISE TO FIRST CALL OUR OFFICE, OR THE DR'S OFFICE  OR GO TO AN URGENT CARE CENTER AND NOT TO THE ED FOR NON-LIFE THREATENING EVENTS. IF IT IS LIFE THREATENING THEN CALL 911 OR GO TO THE CLOSEST ER. Progress toward goals- continuing to work towards goals as reviewed in [address] with patient on each visit  Home exercise program:    activity as tolerated, trying to get physical activity 4-5 x weekly.  stopping activity if causing shortness of breath or chest pain, dizziness or weakness Continued need for the following skills: Nursing, The following discharge planning was discussed with the pt/caregiver: Patient will be discharged once education has completed, and patient is medically stable.

## 2021-11-03 ENCOUNTER — HOME CARE VISIT (OUTPATIENT)
Dept: HOME HEALTH SERVICES | Facility: HOME HEALTH | Age: 56
End: 2021-11-03
Payer: COMMERCIAL

## 2021-11-08 ENCOUNTER — HOME CARE VISIT (OUTPATIENT)
Dept: HOME HEALTH SERVICES | Facility: HOME HEALTH | Age: 56
End: 2021-11-08
Payer: COMMERCIAL

## 2021-11-15 ENCOUNTER — HOME CARE VISIT (OUTPATIENT)
Dept: HOME HEALTH SERVICES | Facility: HOME HEALTH | Age: 56
End: 2021-11-15
Payer: COMMERCIAL

## 2021-11-27 ENCOUNTER — HOME CARE VISIT (OUTPATIENT)
Dept: HOME HEALTH SERVICES | Facility: HOME HEALTH | Age: 56
End: 2021-11-27
Payer: COMMERCIAL

## 2021-11-28 ENCOUNTER — HOME CARE VISIT (OUTPATIENT)
Dept: SCHEDULING | Facility: HOME HEALTH | Age: 56
End: 2021-11-28
Payer: COMMERCIAL

## 2021-11-28 PROCEDURE — 400013 HH SOC

## 2021-11-28 PROCEDURE — G0299 HHS/HOSPICE OF RN EA 15 MIN: HCPCS

## 2021-11-28 NOTE — Clinical Note
SN performed discharge visit on 11/28. Patient was seen for wound care. [facility]. Pt did not have therapy services. Pt has skin graft in place and does not require wound care at this time, pt is meeting all nursing goals and is ready for DC. Pt/cg aware of disease process, s/s to monitor for, who to report to/when. Patient/cg is able to verbalize understanding of all medications at this time: side effects, purposes, dosages, frequencies. SN reviewed in detail all medications with patient/cg. No issues noted at discharge. Patient is aware to follow up with PCP and other MD's. Management aware of DC and approved. Please call #[phone] with any questions. Thank you!

## 2021-11-30 VITALS
RESPIRATION RATE: 17 BRPM | HEART RATE: 98 BPM | DIASTOLIC BLOOD PRESSURE: 88 MMHG | SYSTOLIC BLOOD PRESSURE: 150 MMHG | TEMPERATURE: 97.6 F | OXYGEN SATURATION: 99 %

## 2021-11-30 NOTE — HOME HEALTH
Skilled reason for visit: diabetic infection of the right foot requiring wound care. Caregiver involvement: Patient's cg is his wife. cg lives with patient and is available as needed for assistance with iadls, adls, meal prep, medication management, taking to md appointments. Medications reconciled and all medications are available in the home this visit. The following education was provided regarding medications, medication interactions, and look alike medications (specify): reviewed side effects, purposes, dosage, frequencies. Medications  are effective at this time. Home health supplies by type and quantity ordered/delivered this visit include: na    Patient education provided this visit: patient/cg aware to monitor for edema/increase in edema, to elevate extremity when edema occurs and to notify md if edema exceeds normal limits for patient, minimal edema noted to right foot, dressing in place, pt reporting improved. patient aware to monitor for s/s of infection [increased swelling, increased redness around site, increased pain, foul smelling drainage, fever] aware who to report to/when. no s/s of infection noted. pt aware to keep dressing clean, dry and intact as ordered. right foot wound dressing is clean dry and intact, wound not assessed on visit due to skin graft placement which is non removable. encouraged patient to get three nutritional meals daily and to stay hydrated, drink plenty of fluids. reviewed low sodium diet- patient aware to limit sodium, no added sodium to diet. reviewed foods to avoid, how to order foods when eating out, how to read nutrition labels and measure sodium intake. discussed importance of monitoring blood pressure daily and recording for review, discussed hypertension, causes/long term effects of uncontrolled hypertension. pt aware to follow with diabetic diet- monitoring sugar intake, limiting foods with high sugar content.  Discussed dietary adjustments such as: Reduce portion sizes, Limit daily carbohydrate intake to not greater than 45-60 grams per meal for a total of <180 grams of carbohydrate daily, Avoid concentrated carbohydrates such as soft drinks, sweet tea, and sweet treats. Increase physical activity along with strength training as tolerated to facilitate weight loss and build muscle mass. pt aware to continue monitoring BS and to ensure BS levels are within good range to ensure proper healing. pt aware to follow a high protein diet for healing- to try to get 90g protein daily. discussed fall precautions in detail- having lighted hallways, removing throw rugs, monitoring medication that may alter mental status. pt denies any questions or concerns at this time, aware of DC and agreeable. Skilled Care Performed this visit: na, pt has skin graft wound dressing that is not to be removed at this time. Agency Progress toward goals: Patient was seen for wound care. Pt remaining stable during [facility] services. Pt did not have therapy services. Pt has skin graft in place and does not require wound care at this time, pt is meeting all nursing goals and is ready for DC. Pt/cg aware of disease process, s/s to monitor for, who to report to/when. Patient/cg is able to verbalize understanding of all medications at this time: side effects, purposes, dosages, frequencies. SN reviewed in detail all medications with patient/cg. No issues noted at discharge. Patient is aware to follow up with PCP and other MD's. Management aware of DC and approved. Patient's Progress towards personal goals: pt reporting they are progressing towards their goals at this time, feeling better every day. Home exercise program: patient instructed to perform sob hep 4-5 x daily and prn for sob, to promote lung expansion.  pt also encouraged to use ICS q 2 hours    Continued need for the following skills: na    Plan for next visit: none    Patient and/or caregiver notified and agrees to changes in the Plan of Care NA    The following discharge planning was discussed with the pt/caregiver: Patient is meeting all SN goals at this time and is ready for agency discharge to follow up with PCP/ Surgeon. Patient is aware to follow up with PCP/Surgeon as ordered. Opportunity for questions provided- none at this time. Patient aware to refer any questions after DC to MD office.

## 2022-03-18 PROBLEM — E55.9 VITAMIN D INSUFFICIENCY: Status: ACTIVE | Noted: 2021-09-19

## 2022-03-19 PROBLEM — Z98.890 HISTORY OF INCISION AND DRAINAGE: Status: ACTIVE | Noted: 2021-09-04

## 2022-03-19 PROBLEM — Z78.9 IMPAIRED MOBILITY AND ADLS: Status: ACTIVE | Noted: 2021-09-02

## 2022-03-19 PROBLEM — Z20.822 COVID-19 RULED OUT BY LABORATORY TESTING: Status: ACTIVE | Noted: 2021-09-08

## 2022-03-19 PROBLEM — Z74.09 IMPAIRED MOBILITY AND ADLS: Status: ACTIVE | Noted: 2021-09-02

## 2022-03-20 PROBLEM — Z98.890 STATUS POST INCISION AND DRAINAGE: Status: ACTIVE | Noted: 2021-09-09

## 2022-03-20 PROBLEM — E11.628 DIABETIC INFECTION OF RIGHT FOOT (HCC): Status: ACTIVE | Noted: 2021-09-02

## 2022-03-20 PROBLEM — L08.9 DIABETIC INFECTION OF RIGHT FOOT (HCC): Status: ACTIVE | Noted: 2021-09-02

## 2022-07-03 NOTE — ROUTINE PROCESS
57 SHIFT CHANGE NOTE FOR [facility]    Bedside and Verbal shift change report given to Denis Tong RN (oncoming nurse) by Leida Moss (offgoing nurse). Report included the following information SBAR, Kardex, MAR and Recent Results. Situation:   Code Status: Full Code   Hospital Day: 15   Problem List:   Hospital Problems  Date Reviewed: 10/3/2021        Codes Class Noted POA    Essential hypertension (Chronic) ICD-10-CM: I10  ICD-9-CM: 401.9  Unknown Yes        Type 2 diabetes mellitus without complication, with long-term current use of insulin (HCC) (Chronic) ICD-10-CM: E11.9, Z79.4  ICD-9-CM: 250.00, V58.67  Unknown Yes    Overview Signed 9/20/2021 11:13 PM by Ephriam Ahumada, MD     HbA1c (9/3/2021) = 10.8             Hyperthyroidism (Chronic) ICD-10-CM: E05.90  ICD-9-CM: 242.90  Unknown Yes        Vitamin D insufficiency (Chronic) ICD-10-CM: E55.9  ICD-9-CM: 268.9  9/19/2021 Yes    Overview Signed 9/20/2021 11:11 PM by Ephriam Ahumada, MD     Vitamin D 25-Hydroxy (9/19/2021) = 24.0             Status post incision and drainage ICD-10-CM: Z98.890  ICD-9-CM: V45.89  9/9/2021 Yes    Overview Signed 9/20/2021 11:18 PM by Ephriam Ahumada, MD     S/P Incision and drainage, with decompression of multiple subfascial layers, right foot; Wound debridement, skin, soft tissue, and muscle, right foot (9/9/2021 - Dr. Za Pompa)             AAQSB-94 ruled out by laboratory testing ICD-10-CM: Z20.822  ICD-9-CM: V01.79  9/8/2021 Yes    Overview Signed 9/20/2021 11:14 PM by Ephriam Ahumada, MD     SARS-CoV-2 ([address] [place] [address]) (9/8/2021):  Not detected             History of incision and drainage ICD-10-CM: Z98.890  ICD-9-CM: V45.89  9/4/2021 Yes    Overview Signed 9/20/2021 11:18 PM by Ephriam Ahumada, MD     S/P Incision and drainage of right foot (9/4/2021 - Dr. Av Cooper.)             * (Principal) Diabetic infection of right foot [facility]) ICD-10-CM: E11.628, L08.9  ICD-9-CM: 250.80, 686.9  9/2/2021 Yes        Impaired mobility and ADLs ICD-10-CM: Z74.09, Z78.9  ICD-9-CM: V49.89  9/2/2021 Yes              Background:   Past Medical History:   Past Medical History:   Diagnosis Date    COVID-19 ruled out by laboratory testing 9/8/2021    SARS-CoV-2 ([facility]) (9/8/2021):  Not detected    Diabetic infection of right foot ([address] 9/2/2021    Essential hypertension     Hyperthyroidism     Type 2 diabetes mellitus without complication, with long-term current use of insulin ([facility])     HbA1c (9/3/2021) = 10.8    Vitamin D insufficiency 9/19/2021    Vitamin D 25-Hydroxy (9/19/2021) = 24.0        Assessment:   Changes in Assessment throughout shift: No change to previous assessment    Patient has a central line: no   Patient has Bal Cath: no      Last Vitals:     Vitals:    10/02/21 0759 10/02/21 1508 10/02/21 2100 10/03/21 0800   BP: 128/72 139/79 132/72 (!) 141/80   Pulse: 82 77 81 86   Resp: 16 16 18 16   Temp: 97.7 °F (36.5 °C) 97.9 °F (36.6 °C) 97.9 °F (36.6 °C) 97.5 °F (36.4 °C)   SpO2: 100% 100% 100% 100%   Weight:       Height:            PAIN    Pain Assessment    Pain Intensity 1: 0 (10/03/21 0731) Pain Intensity 1: 2 (12/29/14 1105)    Pain Location 1: Foot Pain Location 1: Abdomen    Pain Intervention(s) 1: Medication (see MAR) (premedicated for dressing change) Pain Intervention(s) 1: Medication (see MAR)  Patient Stated Pain Goal: 0 Patient Stated Pain Goal: 0  o Intervention effective: yes  o Other actions taken for pain:       Skin Assessment  Skin color Skin Color: Appropriate for ethnicity  Condition/Temperature Skin Condition/Temp: Dry, Warm  Integrity Skin Integrity: Wound (add Wound LDA)  Turgor Turgor: Non-tenting  Weekly Pressure Ulcer Documentation  Pressure  Injury Documentation: No Pressure Injury Noted-Pressure Ulcer Prevention Initiated  Wound Prevention & Protection Methods  Orientation of wound Orientation of Wound Prevention: Posterior  Location of Prevention Location of Wound Prevention: Buttocks, Sacrum/Coccyx  Dressing Present Dressing Present : No  Dressing Status    Wound Offloading Wound Offloading (Prevention Methods): Bed, pressure redistribution/air     INTAKE/OUPUT  Date 10/02/21 0700 - 10/03/21 0659 10/03/21 0700 - 10/04/21 0659   Shift 3258-1839 7678-5929 24 Hour Total 2691-6414 9823-0125 24 Hour Total   INTAKE   P.O. 600  600        P. O. 600  600      Shift Total(mL/kg) 600(6.3)  600(6.3)      OUTPUT   Urine(mL/kg/hr)           Urine Occurrence(s) 2 x 2 x 4 x 0 x  0 x   Stool           Stool Occurrence(s) 1 x 0 x 1 x 0 x  0 x   Shift Total(mL/kg)           600      Weight (kg) 96 96 96 96 96 96       Recommendations:  1. Patient needs and requests: Continued education on diabetes    2. Pending tests/procedures: routine labs     3. Functional Level/Equipment:   /      Fall Precautions:   Fall risk precautions were reinforced with the patient; he was instructed to call for help prior to getting up. The following fall risk precautions were continued: bed/ chair alarms, door signage, yellow bracelet and socks as well as update of the Loki Smileyer tool in the patient's room. Willy Score: 3    HEALS Safety Check    A safety check occurred in the patient's room between off going nurse and oncoming nurse listed above.     The safety check included the below items  Area Items   H  High Alert Medications - Verify all high alert medication drips (heparin, PCA, etc.)   E  Equipment - Suction is set up for ALL patients (with yanker)  - Red plugs utilized for all equipment (IV pumps, etc.)  - WOWs wiped down at end of shift.  - Room stocked with oxygen, suction, and other unit-specific supplies   A  Alarms - Bed alarm is set for fall risk patients  - Ensure chair alarm is in place and activated if patient is up in a chair   L  Lines - Check IV for any infiltration  - Bal bag is empty if patient has a Bal   - Tubing and IV bags are labeled   S  Safety   - Room is clean, patient is clean, and equipment is clean. - Hallways are clear from equipment besides carts. - Fall bracelet on for fall risk patients  - Ensure room is clear and free of clutter  - Suction is set up for ALL patients (with roman)  - Hallways are clear from equipment besides carts.    - Isolation precautions followed, supplies available outside room, sign posted     Michelle Coon

## 2024-02-24 NOTE — PLAN OF CARE
"Pt admitted from the ED around 1630. VSS. Afebrile. No nausea. Is NPO currently.Is normally on TPN at home but is currently on hold. Pt informed hospitalist that he did not want to start his TPN tonight. Has IVF's ordered @ 75 ml per hour, but pt declined to have infusion at that rate. States he \"gets full of fluids\" all over his body when he comes in with his frequent SBO's. Hospitalist informed and aware. Pt was OK with fluids running at TKO, since he has a dilaudid PCA pump infusing. Declined capnography but did accept to have continuous pulse oximetry on. Is up independently to the bathroom. Emptied his own ileostomy, but did not measure it. PCA started around 2100. Did switch out patient's own portable PCA pump from home with the hospital pump. Has g-tube attached to LIS for decompression. Has had 400 ml out. Pt declined lovenox injection @ HS. Stated he will ambulate more instead.   " fall

## 2024-06-05 NOTE — TELEPHONE ENCOUNTER
Received VM from home care RN. She tells me that patient is still struggling with pain control despite numerous PCA adjustments. He is not sleeping well at night. She consulted with NP on their team and they are wondering if gabapentin would be beneficial for patient? In her VM she notes that she is aware patient is scheduled for follow up with Dr. Beckham tomorrow.     Will update MD.    Mildly to Moderately Impaired: difficulty hearing in some environments or speaker may need to increase volume or speak distinctly

## 2024-06-12 NOTE — ROUTINE PROCESS
SHIFT CHANGE NOTE FOR [facility]    Bedside and Verbal shift change report given to TAMEKA Shrestha (oncoming nurse) by Antelmo Cam RN (offgoing nurse). Report included the following information SBAR, Kardex, MAR and Recent Results. Situation:   Code Status: Full Code   Hospital Day: 4   Problem List:   Hospital Problems  Date Reviewed: 6/10/2021        Codes Class Noted POA    Essential hypertension (Chronic) ICD-10-CM: I10  ICD-9-CM: 401.9  Unknown Yes        Type 2 diabetes mellitus without complication, with long-term current use of insulin (HCC) (Chronic) ICD-10-CM: E11.9, Z79.4  ICD-9-CM: 250.00, V58.67  Unknown Yes    Overview Signed 9/20/2021 11:13 PM by Lise Gerber MD     HbA1c (9/3/2021) = 10.8             Hyperthyroidism (Chronic) ICD-10-CM: E05.90  ICD-9-CM: 242.90  Unknown Yes        Vitamin D insufficiency (Chronic) ICD-10-CM: E55.9  ICD-9-CM: 268.9  9/19/2021 Yes    Overview Signed 9/20/2021 11:11 PM by Lise Gerber MD     Vitamin D 25-Hydroxy (9/19/2021) = 24.0             Status post incision and drainage ICD-10-CM: Z98.890  ICD-9-CM: V45.89  9/9/2021 Yes    Overview Signed 9/20/2021 11:18 PM by Lise Gerber MD     S/P Incision and drainage, with decompression of multiple subfascial layers, right foot; Wound debridement, skin, soft tissue, and muscle, right foot (9/9/2021 - Dr. Song Gee)             XOXAA-71 ruled out by laboratory testing ICD-10-CM: Z20.822  ICD-9-CM: V01.79  9/8/2021 Yes    Overview Signed 9/20/2021 11:14 PM by Lise Gerber MD     SARS-CoV-2 ([address] [place] [address]) (9/8/2021):  Not detected             History of incision and drainage ICD-10-CM: Z98.890  ICD-9-CM: V45.89  9/4/2021 Yes    Overview Signed 9/20/2021 11:18 PM by Lise Gerber MD     S/P Incision and drainage of right foot (9/4/2021 - Dr. Jerome Rodriguez.)             * (Principal) Diabetic infection of right foot [facility]) ICD-10-CM: E11.628, L08.9  ICD-9-CM: 250.80, 686.9  9/2/2021 Yes Impaired mobility and ADLs ICD-10-CM: Z74.09, Z78.9  ICD-9-CM: V49.89  9/2/2021 Yes              Background:   Past Medical History:   Past Medical History:   Diagnosis Date    COVID-19 ruled out by laboratory testing 9/8/2021    SARS-CoV-2 ([facility]) (9/8/2021):  Not detected    Diabetic infection of right foot ([address] 9/2/2021    Essential hypertension     Hyperthyroidism     Type 2 diabetes mellitus without complication, with long-term current use of insulin ([facility])     HbA1c (9/3/2021) = 10.8    Vitamin D insufficiency 9/19/2021    Vitamin D 25-Hydroxy (9/19/2021) = 24.0        Assessment:   Changes in Assessment throughout shift: No change to previous assessment     Patient has a central line: no  Patient has Bal Cath: no        Last Vitals:     Vitals:    09/20/21 2229 09/21/21 0758 09/21/21 1602 09/21/21 2126   BP: (!) 159/87 (!) 148/88 136/74 (!) 162/85   Pulse: 100 (!) 102 94 93   Resp: 18 19 19 18   Temp: 98.1 °F (36.7 °C) 97.9 °F (36.6 °C) 98.1 °F (36.7 °C) 98.2 °F (36.8 °C)   SpO2: 100% 100% 99% 100%   Weight:       Height:            PAIN    Pain Assessment    Pain Intensity 1: 0 (09/22/21 0404) Pain Intensity 1: 2 (12/29/14 1105)    Pain Location 1: Foot Pain Location 1:      Pain Intervention(s) 1:  (medicated prior to dressing change) Pain Intervention(s) 1: Medication (see MAR)  Patient Stated Pain Goal: 0 Patient Stated Pain Goal: 0  o Intervention effective: no  o Other actions taken for pain:  (medicated prior to dressing change)no complaint of pain     Skin Assessment  Skin color Skin Color: Appropriate for ethnicity  Condition/Temperature Skin Condition/Temp: Dry, Warm  Integrity Skin Integrity: Wound (add Wound LDA)  Turgor Turgor: Non-tenting  Weekly Pressure Ulcer Documentation  Pressure  Injury Documentation: No Pressure Injury Noted-Pressure Ulcer Prevention Initiated  Wound Prevention & Protection Methods  Orientation of wound Orientation of Wound Prevention: Posterior  Location of Prevention Location of Wound Prevention: Buttocks, Sacrum/Coccyx  Dressing Present Dressing Present : No  Dressing Status    Wound Offloading Wound Offloading (Prevention Methods): Bed, pressure redistribution/air     INTAKE/OUPUT  Date 09/21/21 0700 - 09/22/21 0659 09/22/21 0700 - 09/23/21 0659   Shift 8195-0925 8387-7652 24 Hour Total 5228-0833 7096-6129 24 Hour Total   INTAKE   P.O. 480  480        P. O. 480  480      Shift Total(mL/kg) 480(5)  480(5)      OUTPUT   Urine(mL/kg/hr)           Urine Occurrence(s) 2 x 2 x 4 x      Stool           Stool Occurrence(s) 1 x 0 x 1 x      Shift Total(mL/kg)           480      Weight (kg) 96 96 96 96 96 96       Recommendations:  1. Patient needs and requests: Toileting for urination, encourage fluids    2. Pending tests/procedures: routine l;abs    Functional Level/Equipment: Partial (one person) W/C with assist x 1  Fall Precautions:   Fall risk precautions were reinforced with the patient; he was instructed to call for help prior to getting up. The following fall risk precautions were continued: bed/ chair alarms, door signage, yellow bracelet and socks as well as update of the Sensserse tool in the patient's room. Willy Score:      HEALS Safety Check    A safety check occurred in the patient's room between off going nurse and oncoming nurse listed above.     The safety check included the below items  Area Items   H  High Alert Medications - Verify all high alert medication drips (heparin, PCA, etc.)   E  Equipment - Suction is set up for ALL patients (with yanker)  - Red plugs utilized for all equipment (IV pumps, etc.)  - WOWs wiped down at end of shift.  - Room stocked with oxygen, suction, and other unit-specific supplies   A  Alarms - Bed alarm is set for fall risk patients  - Ensure chair alarm is in place and activated if patient is up in a chair   L  Lines - Check IV for any infiltration  - Bal bag is empty if patient has a Bal - Tubing and IV bags are labeled   S  Safety   - Room is clean, patient is clean, and equipment is clean. - Hallways are clear from equipment besides carts. - Fall bracelet on for fall risk patients  - Ensure room is clear and free of clutter  - Suction is set up for ALL patients (with yanker)  - Hallways are clear from equipment besides carts.    - Isolation precautions followed, supplies available outside room, sign posted     Bc Monet RN room air

## 2025-03-18 ENCOUNTER — APPOINTMENT (OUTPATIENT)
Facility: HOSPITAL | Age: 60
DRG: 638 | End: 2025-03-18
Payer: COMMERCIAL

## 2025-03-18 ENCOUNTER — HOSPITAL ENCOUNTER (INPATIENT)
Facility: HOSPITAL | Age: 60
LOS: 1 days | Discharge: HOME | DRG: 638 | End: 2025-03-19
Attending: EMERGENCY MEDICINE | Admitting: STUDENT IN AN ORGANIZED HEALTH CARE EDUCATION/TRAINING PROGRAM
Payer: COMMERCIAL

## 2025-03-18 DIAGNOSIS — R73.9 HYPERGLYCEMIA: Primary | ICD-10-CM

## 2025-03-18 DIAGNOSIS — R42 DIZZINESS: ICD-10-CM

## 2025-03-18 LAB
ALBUMIN SERPL-MCNC: 2.8 G/DL (ref 3.4–5)
ALBUMIN/GLOB SERPL: 0.6 (ref 0.8–1.7)
ALP SERPL-CCNC: 97 U/L (ref 45–117)
ALT SERPL-CCNC: 28 U/L (ref 16–61)
ANION GAP SERPL CALC-SCNC: 12 MMOL/L (ref 3–18)
APPEARANCE UR: CLEAR
AST SERPL-CCNC: 30 U/L (ref 10–38)
B-OH-BUTYR SERPL-SCNC: 1.73 MMOL/L
BACTERIA URNS QL MICRO: NEGATIVE /HPF
BASOPHILS # BLD: 0.03 K/UL (ref 0–0.1)
BASOPHILS NFR BLD: 0.4 % (ref 0–2)
BILIRUB SERPL-MCNC: 0.4 MG/DL (ref 0.2–1)
BILIRUB UR QL: NEGATIVE
BUN SERPL-MCNC: 23 MG/DL (ref 7–18)
BUN/CREAT SERPL: 14 (ref 12–20)
CALCIUM SERPL-MCNC: 8.7 MG/DL (ref 8.5–10.1)
CHLORIDE SERPL-SCNC: 95 MMOL/L (ref 100–111)
CO2 SERPL-SCNC: 19 MMOL/L (ref 21–32)
COLOR UR: YELLOW
CREAT SERPL-MCNC: 1.65 MG/DL (ref 0.6–1.3)
DIFFERENTIAL METHOD BLD: ABNORMAL
EOSINOPHIL # BLD: 0.05 K/UL (ref 0–0.4)
EOSINOPHIL NFR BLD: 0.7 % (ref 0–5)
EPITH CASTS URNS QL MICRO: NEGATIVE /LPF (ref 0–5)
ERYTHROCYTE [DISTWIDTH] IN BLOOD BY AUTOMATED COUNT: 12.7 % (ref 11.6–14.5)
GLOBULIN SER CALC-MCNC: 4.9 G/DL (ref 2–4)
GLUCOSE BLD STRIP.AUTO-MCNC: 156 MG/DL (ref 70–110)
GLUCOSE BLD STRIP.AUTO-MCNC: 177 MG/DL (ref 70–110)
GLUCOSE BLD STRIP.AUTO-MCNC: 210 MG/DL (ref 70–110)
GLUCOSE BLD STRIP.AUTO-MCNC: 220 MG/DL (ref 70–110)
GLUCOSE BLD STRIP.AUTO-MCNC: 313 MG/DL (ref 70–110)
GLUCOSE BLD STRIP.AUTO-MCNC: 327 MG/DL (ref 70–110)
GLUCOSE BLD STRIP.AUTO-MCNC: 353 MG/DL (ref 70–110)
GLUCOSE BLD STRIP.AUTO-MCNC: 377 MG/DL (ref 70–110)
GLUCOSE BLD STRIP.AUTO-MCNC: 468 MG/DL (ref 70–110)
GLUCOSE BLD STRIP.AUTO-MCNC: >600 MG/DL (ref 70–110)
GLUCOSE SERPL-MCNC: 742 MG/DL (ref 74–99)
GLUCOSE UR STRIP.AUTO-MCNC: >1000 MG/DL
HCT VFR BLD AUTO: 45 % (ref 36–48)
HGB BLD-MCNC: 15 G/DL (ref 13–16)
HGB UR QL STRIP: ABNORMAL
IMM GRANULOCYTES # BLD AUTO: 0.02 K/UL (ref 0–0.04)
IMM GRANULOCYTES NFR BLD AUTO: 0.3 % (ref 0–0.5)
KETONES UR QL STRIP.AUTO: 15 MG/DL
LEUKOCYTE ESTERASE UR QL STRIP.AUTO: NEGATIVE
LIPASE SERPL-CCNC: 20 U/L (ref 13–75)
LYMPHOCYTES # BLD: 1.32 K/UL (ref 0.9–3.6)
LYMPHOCYTES NFR BLD: 19.5 % (ref 21–52)
MCH RBC QN AUTO: 30.5 PG (ref 24–34)
MCHC RBC AUTO-ENTMCNC: 33.3 G/DL (ref 31–37)
MCV RBC AUTO: 91.6 FL (ref 78–100)
MONOCYTES # BLD: 0.41 K/UL (ref 0.05–1.2)
MONOCYTES NFR BLD: 6.1 % (ref 3–10)
MUCOUS THREADS URNS QL MICRO: NEGATIVE /LPF
NEUTS SEG # BLD: 4.94 K/UL (ref 1.8–8)
NEUTS SEG NFR BLD: 73 % (ref 40–73)
NITRITE UR QL STRIP.AUTO: NEGATIVE
NRBC # BLD: 0 K/UL (ref 0–0.01)
NRBC BLD-RTO: 0 PER 100 WBC
OSMOLALITY SERPL: 322 MOSM/KG H2O (ref 280–301)
PH UR STRIP: 6 (ref 5–8)
PLATELET # BLD AUTO: 298 K/UL (ref 135–420)
PMV BLD AUTO: 10.2 FL (ref 9.2–11.8)
POTASSIUM SERPL-SCNC: 4.2 MMOL/L (ref 3.5–5.5)
PROT SERPL-MCNC: 7.7 G/DL (ref 6.4–8.2)
PROT UR STRIP-MCNC: 30 MG/DL
RBC # BLD AUTO: 4.91 M/UL (ref 4.35–5.65)
RBC #/AREA URNS HPF: NORMAL /HPF (ref 0–5)
SODIUM SERPL-SCNC: 126 MMOL/L (ref 136–145)
SP GR UR REFRACTOMETRY: 1.02 (ref 1–1.03)
TSH SERPL DL<=0.05 MIU/L-ACNC: 3.01 UIU/ML (ref 0.36–3.74)
UROBILINOGEN UR QL STRIP.AUTO: 0.2 EU/DL (ref 0.2–1)
WBC # BLD AUTO: 6.8 K/UL (ref 4.6–13.2)
WBC URNS QL MICRO: NEGATIVE /HPF (ref 0–5)

## 2025-03-18 PROCEDURE — 80053 COMPREHEN METABOLIC PANEL: CPT

## 2025-03-18 PROCEDURE — 85025 COMPLETE CBC W/AUTO DIFF WBC: CPT

## 2025-03-18 PROCEDURE — 2580000003 HC RX 258: Performed by: STUDENT IN AN ORGANIZED HEALTH CARE EDUCATION/TRAINING PROGRAM

## 2025-03-18 PROCEDURE — 99285 EMERGENCY DEPT VISIT HI MDM: CPT

## 2025-03-18 PROCEDURE — 6360000004 HC RX CONTRAST MEDICATION: Performed by: EMERGENCY MEDICINE

## 2025-03-18 PROCEDURE — 82962 GLUCOSE BLOOD TEST: CPT

## 2025-03-18 PROCEDURE — 93005 ELECTROCARDIOGRAM TRACING: CPT | Performed by: STUDENT IN AN ORGANIZED HEALTH CARE EDUCATION/TRAINING PROGRAM

## 2025-03-18 PROCEDURE — 83690 ASSAY OF LIPASE: CPT

## 2025-03-18 PROCEDURE — 6370000000 HC RX 637 (ALT 250 FOR IP): Performed by: INTERNAL MEDICINE

## 2025-03-18 PROCEDURE — 84443 ASSAY THYROID STIM HORMONE: CPT

## 2025-03-18 PROCEDURE — 71045 X-RAY EXAM CHEST 1 VIEW: CPT

## 2025-03-18 PROCEDURE — 6370000000 HC RX 637 (ALT 250 FOR IP): Performed by: EMERGENCY MEDICINE

## 2025-03-18 PROCEDURE — 81001 URINALYSIS AUTO W/SCOPE: CPT

## 2025-03-18 PROCEDURE — 70551 MRI BRAIN STEM W/O DYE: CPT

## 2025-03-18 PROCEDURE — 82010 KETONE BODYS QUAN: CPT

## 2025-03-18 PROCEDURE — 92610 EVALUATE SWALLOWING FUNCTION: CPT

## 2025-03-18 PROCEDURE — 70496 CT ANGIOGRAPHY HEAD: CPT

## 2025-03-18 PROCEDURE — 2500000003 HC RX 250 WO HCPCS: Performed by: STUDENT IN AN ORGANIZED HEALTH CARE EDUCATION/TRAINING PROGRAM

## 2025-03-18 PROCEDURE — 70450 CT HEAD/BRAIN W/O DYE: CPT

## 2025-03-18 PROCEDURE — 2580000003 HC RX 258: Performed by: EMERGENCY MEDICINE

## 2025-03-18 PROCEDURE — 92526 ORAL FUNCTION THERAPY: CPT

## 2025-03-18 PROCEDURE — 99223 1ST HOSP IP/OBS HIGH 75: CPT | Performed by: STUDENT IN AN ORGANIZED HEALTH CARE EDUCATION/TRAINING PROGRAM

## 2025-03-18 PROCEDURE — 6370000000 HC RX 637 (ALT 250 FOR IP): Performed by: STUDENT IN AN ORGANIZED HEALTH CARE EDUCATION/TRAINING PROGRAM

## 2025-03-18 PROCEDURE — 96360 HYDRATION IV INFUSION INIT: CPT

## 2025-03-18 PROCEDURE — 83930 ASSAY OF BLOOD OSMOLALITY: CPT

## 2025-03-18 PROCEDURE — 6360000002 HC RX W HCPCS: Performed by: STUDENT IN AN ORGANIZED HEALTH CARE EDUCATION/TRAINING PROGRAM

## 2025-03-18 PROCEDURE — 1100000003 HC PRIVATE W/ TELEMETRY

## 2025-03-18 PROCEDURE — 87086 URINE CULTURE/COLONY COUNT: CPT

## 2025-03-18 RX ORDER — POLYETHYLENE GLYCOL 3350 17 G/17G
17 POWDER, FOR SOLUTION ORAL DAILY PRN
Status: DISCONTINUED | OUTPATIENT
Start: 2025-03-18 | End: 2025-03-19 | Stop reason: HOSPADM

## 2025-03-18 RX ORDER — INSULIN LISPRO 100 [IU]/ML
0-8 INJECTION, SOLUTION INTRAVENOUS; SUBCUTANEOUS
Status: DISCONTINUED | OUTPATIENT
Start: 2025-03-18 | End: 2025-03-19 | Stop reason: HOSPADM

## 2025-03-18 RX ORDER — ACETAMINOPHEN 325 MG/1
650 TABLET ORAL EVERY 6 HOURS PRN
Status: DISCONTINUED | OUTPATIENT
Start: 2025-03-18 | End: 2025-03-19 | Stop reason: HOSPADM

## 2025-03-18 RX ORDER — ENOXAPARIN SODIUM 100 MG/ML
40 INJECTION SUBCUTANEOUS DAILY
Status: DISCONTINUED | OUTPATIENT
Start: 2025-03-18 | End: 2025-03-19 | Stop reason: HOSPADM

## 2025-03-18 RX ORDER — SODIUM CHLORIDE 9 MG/ML
INJECTION, SOLUTION INTRAVENOUS PRN
Status: DISCONTINUED | OUTPATIENT
Start: 2025-03-18 | End: 2025-03-19 | Stop reason: HOSPADM

## 2025-03-18 RX ORDER — ATORVASTATIN CALCIUM 40 MG/1
80 TABLET, FILM COATED ORAL NIGHTLY
Status: DISCONTINUED | OUTPATIENT
Start: 2025-03-18 | End: 2025-03-19 | Stop reason: HOSPADM

## 2025-03-18 RX ORDER — IOPAMIDOL 755 MG/ML
80 INJECTION, SOLUTION INTRAVASCULAR
Status: COMPLETED | OUTPATIENT
Start: 2025-03-18 | End: 2025-03-18

## 2025-03-18 RX ORDER — 0.9 % SODIUM CHLORIDE 0.9 %
1000 INTRAVENOUS SOLUTION INTRAVENOUS ONCE
Status: COMPLETED | OUTPATIENT
Start: 2025-03-18 | End: 2025-03-18

## 2025-03-18 RX ORDER — DEXTROSE MONOHYDRATE 100 MG/ML
INJECTION, SOLUTION INTRAVENOUS CONTINUOUS PRN
Status: DISCONTINUED | OUTPATIENT
Start: 2025-03-18 | End: 2025-03-19 | Stop reason: HOSPADM

## 2025-03-18 RX ORDER — SODIUM CHLORIDE 0.9 % (FLUSH) 0.9 %
5-40 SYRINGE (ML) INJECTION PRN
Status: DISCONTINUED | OUTPATIENT
Start: 2025-03-18 | End: 2025-03-19 | Stop reason: HOSPADM

## 2025-03-18 RX ORDER — CLOPIDOGREL BISULFATE 75 MG/1
75 TABLET ORAL DAILY
Status: DISCONTINUED | OUTPATIENT
Start: 2025-03-19 | End: 2025-03-19 | Stop reason: HOSPADM

## 2025-03-18 RX ORDER — ACETAMINOPHEN 325 MG/1
650 TABLET ORAL
Status: COMPLETED | OUTPATIENT
Start: 2025-03-18 | End: 2025-03-18

## 2025-03-18 RX ORDER — SODIUM CHLORIDE 9 MG/ML
INJECTION, SOLUTION INTRAVENOUS CONTINUOUS
Status: DISCONTINUED | OUTPATIENT
Start: 2025-03-18 | End: 2025-03-19 | Stop reason: HOSPADM

## 2025-03-18 RX ORDER — ONDANSETRON 2 MG/ML
4 INJECTION INTRAMUSCULAR; INTRAVENOUS EVERY 6 HOURS PRN
Status: DISCONTINUED | OUTPATIENT
Start: 2025-03-18 | End: 2025-03-19 | Stop reason: HOSPADM

## 2025-03-18 RX ORDER — INSULIN GLARGINE 100 [IU]/ML
20 INJECTION, SOLUTION SUBCUTANEOUS NIGHTLY
Status: DISCONTINUED | OUTPATIENT
Start: 2025-03-18 | End: 2025-03-19 | Stop reason: HOSPADM

## 2025-03-18 RX ORDER — ASPIRIN 81 MG/1
81 TABLET ORAL DAILY
Status: DISCONTINUED | OUTPATIENT
Start: 2025-03-19 | End: 2025-03-19 | Stop reason: HOSPADM

## 2025-03-18 RX ORDER — ASPIRIN 325 MG
325 TABLET ORAL
Status: COMPLETED | OUTPATIENT
Start: 2025-03-18 | End: 2025-03-18

## 2025-03-18 RX ORDER — CLOPIDOGREL 300 MG/1
300 TABLET, FILM COATED ORAL
Status: COMPLETED | OUTPATIENT
Start: 2025-03-18 | End: 2025-03-18

## 2025-03-18 RX ORDER — SODIUM CHLORIDE 0.9 % (FLUSH) 0.9 %
5-40 SYRINGE (ML) INJECTION EVERY 12 HOURS SCHEDULED
Status: DISCONTINUED | OUTPATIENT
Start: 2025-03-18 | End: 2025-03-19 | Stop reason: HOSPADM

## 2025-03-18 RX ORDER — INSULIN GLARGINE 100 [IU]/ML
10 INJECTION, SOLUTION SUBCUTANEOUS NIGHTLY
Status: DISCONTINUED | OUTPATIENT
Start: 2025-03-18 | End: 2025-03-18

## 2025-03-18 RX ORDER — HYDRALAZINE HYDROCHLORIDE 20 MG/ML
10 INJECTION INTRAMUSCULAR; INTRAVENOUS EVERY 6 HOURS PRN
Status: DISCONTINUED | OUTPATIENT
Start: 2025-03-18 | End: 2025-03-19 | Stop reason: HOSPADM

## 2025-03-18 RX ORDER — ONDANSETRON 4 MG/1
4 TABLET, ORALLY DISINTEGRATING ORAL EVERY 8 HOURS PRN
Status: DISCONTINUED | OUTPATIENT
Start: 2025-03-18 | End: 2025-03-19 | Stop reason: HOSPADM

## 2025-03-18 RX ADMIN — INSULIN HUMAN 20 UNITS: 100 INJECTION, SOLUTION PARENTERAL at 02:53

## 2025-03-18 RX ADMIN — ENOXAPARIN SODIUM 40 MG: 100 INJECTION SUBCUTANEOUS at 10:42

## 2025-03-18 RX ADMIN — INSULIN GLARGINE 20 UNITS: 100 INJECTION, SOLUTION SUBCUTANEOUS at 20:34

## 2025-03-18 RX ADMIN — ATORVASTATIN CALCIUM 80 MG: 40 TABLET, FILM COATED ORAL at 20:18

## 2025-03-18 RX ADMIN — IOPAMIDOL 80 ML: 755 INJECTION, SOLUTION INTRAVENOUS at 07:43

## 2025-03-18 RX ADMIN — SODIUM CHLORIDE: 0.9 INJECTION, SOLUTION INTRAVENOUS at 18:57

## 2025-03-18 RX ADMIN — ACETAMINOPHEN 650 MG: 325 TABLET ORAL at 06:59

## 2025-03-18 RX ADMIN — SODIUM CHLORIDE 1000 ML: 0.9 INJECTION, SOLUTION INTRAVENOUS at 02:32

## 2025-03-18 RX ADMIN — INSULIN LISPRO 8 UNITS: 100 INJECTION, SOLUTION INTRAVENOUS; SUBCUTANEOUS at 17:23

## 2025-03-18 RX ADMIN — SODIUM CHLORIDE, PRESERVATIVE FREE 10 ML: 5 INJECTION INTRAVENOUS at 10:43

## 2025-03-18 RX ADMIN — ASPIRIN 325 MG: 325 TABLET ORAL at 08:07

## 2025-03-18 RX ADMIN — ACETAMINOPHEN 650 MG: 325 TABLET ORAL at 21:16

## 2025-03-18 RX ADMIN — CLOPIDOGREL BISULFATE 300 MG: 300 TABLET, FILM COATED ORAL at 08:07

## 2025-03-18 RX ADMIN — INSULIN LISPRO 8 UNITS: 100 INJECTION, SOLUTION INTRAVENOUS; SUBCUTANEOUS at 20:34

## 2025-03-18 ASSESSMENT — PAIN SCALES - GENERAL
PAINLEVEL_OUTOF10: 3
PAINLEVEL_OUTOF10: 3
PAINLEVEL_OUTOF10: 1

## 2025-03-18 ASSESSMENT — PAIN DESCRIPTION - LOCATION
LOCATION: HEAD
LOCATION: HEAD

## 2025-03-18 ASSESSMENT — PAIN - FUNCTIONAL ASSESSMENT: PAIN_FUNCTIONAL_ASSESSMENT: 0-10

## 2025-03-18 ASSESSMENT — PAIN DESCRIPTION - DESCRIPTORS
DESCRIPTORS: DISCOMFORT
DESCRIPTORS: DISCOMFORT

## 2025-03-18 ASSESSMENT — PAIN DESCRIPTION - ORIENTATION: ORIENTATION: RIGHT;LEFT

## 2025-03-18 NOTE — DISCHARGE INSTRUCTIONS
Discharge Instructions:  Take Aspirin and Atorvastatin 40 mg as prescribed.  Follow-up with your Primary Care Provider (a.k.a. PCP) as scheduled or in ~1 week---if you have to make an appointment, tell them you need to see them for \"Post-Acute Care.\"    Discharge Recommendations:  If you should experience sudden vision changes, blurriness of vision, acute vision loss, hearing loss, dizziness (with the sensation that you or your environment are spinning/moving when they are not), difficulty swallowing, difficulty speaking words, inability to process other peoples' speech, inability to speak, weakness in one arm/leg/half of body, loss of sensation, and/or slurring of speech, you should seek medical evaluation.           DISCHARGE SUMMARY from Nurse    PATIENT INSTRUCTIONS:    After general anesthesia or intravenous sedation, for 24 hours or while taking prescription Narcotics:  Limit your activities  Do not drive and operate hazardous machinery  Do not make important personal or business decisions  Do  not drink alcoholic beverages  If you have not urinated within 8 hours after discharge, please contact your surgeon on call.    Report the following to your surgeon:  Excessive pain, swelling, redness or odor of or around the surgical area  Temperature over 100.5  Nausea and vomiting lasting longer than 4 hours or if unable to take medications  Any signs of decreased circulation or nerve impairment to extremity: change in color, persistent  numbness, tingling, coldness or increase pain  Any questions    What to do at Home:  Recommended activity: activity as tolerated,     If you experience any of the following symptoms chest pain, shortness of breath, dizziness, high/low blood sugar, return to ER if needed, please follow up with Jana Escobar.    *  Please give a list of your current medications to your Primary Care Provider.    *  Please update this list whenever your medications are discontinued, doses are

## 2025-03-18 NOTE — H&P
signed by Wilfredo GODFREY CHEST PORTABLE  Result Date: 3/18/2025  : 1.  No acute cardiopulmonary disease. Electronically signed by Jimenez Nix        Assessment/Plan:      Principal Problem:    Dizziness  Active Problems:    Essential hypertension    Type 2 diabetes mellitus without complication, with long-term current use of insulin (HCC)  Resolved Problems:    * No resolved hospital problems. *       Assessment:    Condition: In stable condition.  Improving.   (    #Unsteady gait, concern for posterior stroke  #Type 2 diabetes, insulin-dependent with mild DKA.  CO2 19 and elevated beta hydroxybutyrate  #Hypertension, uncontrolled).     Plan:   Consults: neurology.  Regular diet.  Administer medications as ordered and give fluids.   (    - Pending results of MRI brain  - ASA, statin, Plavix  - Pending A1c, and lipid panel  - Allow permissive hypertension for 24 hours. Start anti-hypertensive if BP >220/110  - Frequent neuro checks  - Neuro consult  - PT/OT/SLP  -NS at 50 cc an hour  -Check BMP every 12 hours  - Replace electrolytes as needed  - Lantus 20 units nightly and high resistance sliding insulin  - Dietitian referral for diabetes and hypertension  ).          [x]Reviewed outside notes   [x]Reviewed labs and imaging          Prophylaxis:  [x]Lovenox  []Coumadin  []Hep SQ  []SCD’s  []H2B/PPI    []Xarelto  []Eliquis    Disposition:  [x]Home w/ Family   [] PT,OT,RN   []SNF/LTC   []SAH/Rehab    Discussed Code Status:    [x]Full Code      []DNR     ___________________________________________________    Care Plan discussed with:    [x]Patient   [x]Family    []ED Care Manager  [x]ED Doc   []Specialist :    Total Time Coordinating Admission:  75    minutes   ___________________________________________________  Admitting Physician:  Angelic River DO, MBA, MS [facility]  Hospitalist

## 2025-03-18 NOTE — ED NOTES
TRANSFER - OUT REPORT:    Verbal report given to Carly MALONEY  on Senthil Renteria  being transferred to [facility] for routine progression of patient care       Report consisted of patient's Situation, Background, Assessment and   Recommendations(SBAR).     Information from the following report(s) Nurse Handoff Report was reviewed with the receiving nurse.    Griffin Fall Assessment:    Presents to emergency department  because of falls (Syncope, seizure, or loss of consciousness): No  Age > 70: No  Altered Mental Status, Intoxication with alcohol or substance confusion (Disorientation, impaired judgment, poor safety awaremess, or inability to follow instructions): No  Impaired Mobility: Ambulates or transfers with assistive devices or assistance; Unable to ambulate or transer.: No  Nursing Judgement: No          Lines:   Peripheral IV 03/18/25 Left;Proximal;Ventral;Anterior Forearm (Active)        Opportunity for questions and clarification was provided.      Patient transported with:  Tech

## 2025-03-18 NOTE — PLAN OF CARE
Problem: SLP Adult - Impaired Swallowing  Goal: By Discharge: Advance to least restrictive diet without signs or symptoms of aspiration for planned discharge setting.  See evaluation for individualized goals.  Description: Patient will:  1. Tolerate PO trials with 0 s/s overt distress in 4/5 trials  2. Utilize compensatory swallow strategies/maneuvers (decrease bite/sip, size/rate, alt. liq/sol) with min cues in 4/5 trials    Rec:     Regular diet with thin liquids  Aspiration precautions  HOB >45 during po intake, remain >30 for 30-45 minutes after po   Small bites/sips; alternate liquid/solid with slow feeding rate   Oral care TID  Meds per pt preference  Outcome: Progressing   SPEECH LANGUAGE PATHOLOGY BEDSIDE SWALLOW EVALUATION/TREATMENT    Patient: Senthil Renteria (59 y.o. male)  Date: 3/18/2025  Primary Diagnosis: Dizziness [R42]       Precautions: Aspiration  PLOF: As per H&P  ASSESSMENT:  Based on the objective data described below, the patient presents with oropharyngeal swallow fxn essentially WFL. Strength, ROM, and coordination of the orofacial musculature were all found to be WFL. Pt tolerated reg solid, puree, and thin liquids +/- straw consecutive swallows without any overt s/sx of aspiration. Mastication was appropriate with timely a-p transit. Positive oral clearance observed across all trials. Pt safe for initiation of reg solid diet with thin liquids, aspiration precautions, oral care TID, and meds as tolerated.     TREATMENT:  Skilled therapy initiated; Educated patient on aspiration precautions and importance of compensatory swallow techniques to decrease aspiration risk (decrease rate of intake & sip/bite size, upright @HOB for all po intake and ~30 minutes after po); verbalized comprehension. ST will continue to follow x 1-2 visits to ensure safety of diet tolerance.    Patient will benefit from skilled intervention to address the above impairments.  Patient's rehabilitation potential/

## 2025-03-18 NOTE — PLAN OF CARE
Problem: Chronic Conditions and Co-morbidities  Goal: Patient's chronic conditions and co-morbidity symptoms are monitored and maintained or improved  Outcome: Progressing  Flowsheets (Taken 3/18/2025 1730)  Care Plan - Patient's Chronic Conditions and Co-Morbidity Symptoms are Monitored and Maintained or Improved:   Monitor and assess patient's chronic conditions and comorbid symptoms for stability, deterioration, or improvement   Collaborate with multidisciplinary team to address chronic and comorbid conditions and prevent exacerbation or deterioration   Update acute care plan with appropriate goals if chronic or comorbid symptoms are exacerbated and prevent overall improvement and discharge  Monitor pt's blood pressure, pt in permissive hypertension for stroke work up, monitor pt's blood sugar and ensure it maintains in prescribed range     Problem: Discharge Planning  Goal: Discharge to home or other facility with appropriate resources  Outcome: Progressing  Flowsheets (Taken 3/18/2025 1730)  Discharge to home or other facility with appropriate resources:   Identify barriers to discharge with patient and caregiver   Arrange for needed discharge resources and transportation as appropriate   Identify discharge learning needs (meds, wound care, etc)     Problem: Safety - Adult  Goal: Free from fall injury  Outcome: Progressing  Note: Instruct pt and care giver on fall precautions,      Problem: Musculoskeletal - Adult  Goal: Return mobility to safest level of function  Outcome: Progressing  Note: Monitor pt's ability to transfer safely     Problem: Metabolic/Fluid and Electrolytes - Adult  Goal: Glucose maintained within prescribed range  Outcome: Progressing  Note: Monitor pt's blood sugar ACHS and provide insulin as ordered

## 2025-03-18 NOTE — ED PROVIDER NOTES
EMERGENCY DEPARTMENT HISTORY AND PHYSICAL EXAM      Date: 3/18/2025  Patient Name: Senthil Renteria    History of Presenting Illness     Chief Complaint   Patient presents with    Hyperglycemia       History (Context): Senthil Renteria is a 59 y.o. male  w/ pmhx of hypertension, diabetes presents to the hospital today with hyperglycemia.  According to the wife at bedside states patient was sleeping and then woke up in the middle of the night and was acting strange patient walked to the bathroom and then came back and was having visual hallucinations thinking that there were objects on the bed but there was nothing on the bed.  EMS checked the sugar and the sugar was high in triage over 600.  Patient is not acutely complaining of anything in particular.  According to the wife at bedside states that patient had a dinner today which included mashed potatoes.  States that patient did not take his insulin and went to sleep without insulin.    Denies any acute complaints at this time    PCP: Rony Escobar MD    Current Facility-Administered Medications   Medication Dose Route Frequency Provider Last Rate Last Admin    clopidogrel (PLAVIX) tablet 300 mg  300 mg Oral NOW Gonzales Shepard MD        aspirin tablet 325 mg  325 mg Oral NOW Gonzales Shepard MD         Current Outpatient Medications   Medication Sig Dispense Refill    acetaminophen (TYLENOL) 325 MG tablet Take 650 mg by mouth every 4 hours as needed      Cholecalciferol 50 MCG (2000 UT) CAPS Take 2,000 Units by mouth daily      insulin glargine (LANTUS SOLOSTAR) 100 UNIT/ML injection pen Inject 20 Units into the skin      insulin lispro (HUMALOG) 100 UNIT/ML SOLN injection vial [The details of the medication are not available because there are pending changes by a home health clinician.]      lisinopril (PRINIVIL;ZESTRIL) 40 MG tablet Take 40 mg by mouth daily      methIMAzole (TAPAZOLE) 10 MG tablet Take 10 mg by mouth daily      metoprolol tartrate (LOPRESSOR)

## 2025-03-19 ENCOUNTER — APPOINTMENT (OUTPATIENT)
Facility: HOSPITAL | Age: 60
DRG: 638 | End: 2025-03-19
Attending: INTERNAL MEDICINE
Payer: COMMERCIAL

## 2025-03-19 VITALS
SYSTOLIC BLOOD PRESSURE: 163 MMHG | WEIGHT: 210 LBS | BODY MASS INDEX: 29.4 KG/M2 | DIASTOLIC BLOOD PRESSURE: 96 MMHG | TEMPERATURE: 98 F | HEART RATE: 91 BPM | RESPIRATION RATE: 18 BRPM | HEIGHT: 71 IN | OXYGEN SATURATION: 100 %

## 2025-03-19 PROBLEM — R55 FAINTNESS: Status: ACTIVE | Noted: 2025-03-18

## 2025-03-19 PROBLEM — G93.89 CEREBRAL VENTRICULOMEGALY: Status: ACTIVE | Noted: 2025-03-19

## 2025-03-19 LAB
ANION GAP SERPL CALC-SCNC: 4 MMOL/L (ref 3–18)
BACTERIA SPEC CULT: NORMAL
BUN SERPL-MCNC: 16 MG/DL (ref 7–18)
BUN/CREAT SERPL: 13 (ref 12–20)
CALCIUM SERPL-MCNC: 8.6 MG/DL (ref 8.5–10.1)
CHLORIDE SERPL-SCNC: 102 MMOL/L (ref 100–111)
CHOLEST SERPL-MCNC: 290 MG/DL
CO2 SERPL-SCNC: 25 MMOL/L (ref 21–32)
CREAT SERPL-MCNC: 1.27 MG/DL (ref 0.6–1.3)
ECHO AO ASC DIAM: 3.2 CM
ECHO AO ASCENDING AORTA INDEX: 1.49 CM/M2
ECHO AO ROOT DIAM: 3.8 CM
ECHO AO ROOT INDEX: 1.77 CM/M2
ECHO AV PEAK GRADIENT: 4 MMHG
ECHO AV PEAK VELOCITY: 0.9 M/S
ECHO AV VELOCITY RATIO: 0.89
ECHO BSA: 2.18 M2
ECHO LA DIAMETER INDEX: 1.4 CM/M2
ECHO LA DIAMETER: 3 CM
ECHO LA TO AORTIC ROOT RATIO: 0.79
ECHO LA VOL A-L A2C: 30 ML (ref 18–58)
ECHO LA VOL A-L A4C: 40 ML (ref 18–58)
ECHO LA VOL BP: 33 ML (ref 18–58)
ECHO LA VOL MOD A2C: 29 ML (ref 18–58)
ECHO LA VOL MOD A4C: 35 ML (ref 18–58)
ECHO LA VOL/BSA BIPLANE: 15 ML/M2 (ref 16–34)
ECHO LA VOLUME AREA LENGTH: 36 ML
ECHO LA VOLUME INDEX A-L A2C: 14 ML/M2 (ref 16–34)
ECHO LA VOLUME INDEX A-L A4C: 19 ML/M2 (ref 16–34)
ECHO LA VOLUME INDEX AREA LENGTH: 17 ML/M2 (ref 16–34)
ECHO LA VOLUME INDEX MOD A2C: 13 ML/M2 (ref 16–34)
ECHO LA VOLUME INDEX MOD A4C: 16 ML/M2 (ref 16–34)
ECHO LV E' LATERAL VELOCITY: 7.6 CM/S
ECHO LV E' SEPTAL VELOCITY: 7.52 CM/S
ECHO LV EF PHYSICIAN: 60 %
ECHO LV FRACTIONAL SHORTENING: 35 % (ref 28–44)
ECHO LV INTERNAL DIMENSION DIASTOLE INDEX: 1.86 CM/M2
ECHO LV INTERNAL DIMENSION DIASTOLIC: 4 CM (ref 4.2–5.9)
ECHO LV INTERNAL DIMENSION SYSTOLIC INDEX: 1.21 CM/M2
ECHO LV INTERNAL DIMENSION SYSTOLIC: 2.6 CM
ECHO LV IVSD: 0.9 CM (ref 0.6–1)
ECHO LV MASS 2D: 109.7 G (ref 88–224)
ECHO LV MASS INDEX 2D: 51 G/M2 (ref 49–115)
ECHO LV POSTERIOR WALL DIASTOLIC: 0.9 CM (ref 0.6–1)
ECHO LV RELATIVE WALL THICKNESS RATIO: 0.45
ECHO LVOT PEAK GRADIENT: 2 MMHG
ECHO LVOT PEAK VELOCITY: 0.8 M/S
ECHO MV A VELOCITY: 0.87 M/S
ECHO MV E DECELERATION TIME (DT): 131.7 MS
ECHO MV E VELOCITY: 0.54 M/S
ECHO MV E/A RATIO: 0.62
ECHO MV E/E' LATERAL: 7.11
ECHO MV E/E' RATIO (AVERAGED): 7.14
ECHO MV E/E' SEPTAL: 7.18
ECHO PV MAX VELOCITY: 0.9 M/S
ECHO PV PEAK GRADIENT: 3 MMHG
ECHO RV FREE WALL PEAK S': 13.7 CM/S
ECHO RV TAPSE: 2.5 CM (ref 1.7–?)
EKG ATRIAL RATE: 100 BPM
EKG DIAGNOSIS: NORMAL
EKG P AXIS: 69 DEGREES
EKG P-R INTERVAL: 160 MS
EKG Q-T INTERVAL: 332 MS
EKG QRS DURATION: 76 MS
EKG QTC CALCULATION (BAZETT): 426 MS
EKG R AXIS: -1 DEGREES
EKG T AXIS: 75 DEGREES
EKG VENTRICULAR RATE: 99 BPM
ERYTHROCYTE [DISTWIDTH] IN BLOOD BY AUTOMATED COUNT: 12.7 % (ref 11.6–14.5)
EST. AVERAGE GLUCOSE BLD GHB EST-MCNC: 255 MG/DL
GLUCOSE BLD STRIP.AUTO-MCNC: 163 MG/DL (ref 70–110)
GLUCOSE BLD STRIP.AUTO-MCNC: 314 MG/DL (ref 70–110)
GLUCOSE BLD STRIP.AUTO-MCNC: 347 MG/DL (ref 70–110)
GLUCOSE BLD STRIP.AUTO-MCNC: 370 MG/DL (ref 70–110)
GLUCOSE BLD STRIP.AUTO-MCNC: 395 MG/DL (ref 70–110)
GLUCOSE BLD STRIP.AUTO-MCNC: 417 MG/DL (ref 70–110)
GLUCOSE SERPL-MCNC: 338 MG/DL (ref 74–99)
HBA1C MFR BLD: 10.5 % (ref 4.2–5.6)
HCT VFR BLD AUTO: 43 % (ref 36–48)
HDLC SERPL-MCNC: 109 MG/DL (ref 40–60)
HDLC SERPL: 2.7 (ref 0–5)
HGB BLD-MCNC: 14.8 G/DL (ref 13–16)
LDLC SERPL CALC-MCNC: 141.6 MG/DL (ref 0–100)
LIPID PANEL: ABNORMAL
MCH RBC QN AUTO: 31.4 PG (ref 24–34)
MCHC RBC AUTO-ENTMCNC: 34.4 G/DL (ref 31–37)
MCV RBC AUTO: 91.1 FL (ref 78–100)
NRBC # BLD: 0 K/UL (ref 0–0.01)
NRBC BLD-RTO: 0 PER 100 WBC
PLATELET # BLD AUTO: 324 K/UL (ref 135–420)
PMV BLD AUTO: 9.9 FL (ref 9.2–11.8)
POTASSIUM SERPL-SCNC: 5 MMOL/L (ref 3.5–5.5)
RBC # BLD AUTO: 4.72 M/UL (ref 4.35–5.65)
SERVICE CMNT-IMP: NORMAL
SODIUM SERPL-SCNC: 131 MMOL/L (ref 136–145)
TRIGL SERPL-MCNC: 197 MG/DL
VLDLC SERPL CALC-MCNC: 39.4 MG/DL
WBC # BLD AUTO: 7.3 K/UL (ref 4.6–13.2)

## 2025-03-19 PROCEDURE — 94761 N-INVAS EAR/PLS OXIMETRY MLT: CPT

## 2025-03-19 PROCEDURE — 2500000003 HC RX 250 WO HCPCS: Performed by: STUDENT IN AN ORGANIZED HEALTH CARE EDUCATION/TRAINING PROGRAM

## 2025-03-19 PROCEDURE — 83036 HEMOGLOBIN GLYCOSYLATED A1C: CPT

## 2025-03-19 PROCEDURE — 93306 TTE W/DOPPLER COMPLETE: CPT | Performed by: INTERNAL MEDICINE

## 2025-03-19 PROCEDURE — 82962 GLUCOSE BLOOD TEST: CPT

## 2025-03-19 PROCEDURE — 97165 OT EVAL LOW COMPLEX 30 MIN: CPT

## 2025-03-19 PROCEDURE — 97161 PT EVAL LOW COMPLEX 20 MIN: CPT

## 2025-03-19 PROCEDURE — 80048 BASIC METABOLIC PNL TOTAL CA: CPT

## 2025-03-19 PROCEDURE — 80061 LIPID PANEL: CPT

## 2025-03-19 PROCEDURE — 93010 ELECTROCARDIOGRAM REPORT: CPT | Performed by: INTERNAL MEDICINE

## 2025-03-19 PROCEDURE — 85027 COMPLETE CBC AUTOMATED: CPT

## 2025-03-19 PROCEDURE — 93306 TTE W/DOPPLER COMPLETE: CPT

## 2025-03-19 PROCEDURE — 6370000000 HC RX 637 (ALT 250 FOR IP): Performed by: STUDENT IN AN ORGANIZED HEALTH CARE EDUCATION/TRAINING PROGRAM

## 2025-03-19 PROCEDURE — 36415 COLL VENOUS BLD VENIPUNCTURE: CPT

## 2025-03-19 PROCEDURE — 6360000002 HC RX W HCPCS: Performed by: STUDENT IN AN ORGANIZED HEALTH CARE EDUCATION/TRAINING PROGRAM

## 2025-03-19 RX ORDER — ACYCLOVIR 200 MG/1
CAPSULE ORAL
Status: DISCONTINUED
Start: 2025-03-19 | End: 2025-03-19 | Stop reason: HOSPADM

## 2025-03-19 RX ORDER — ACYCLOVIR 200 MG/1
10 CAPSULE ORAL ONCE
Status: DISCONTINUED | OUTPATIENT
Start: 2025-03-19 | End: 2025-03-19 | Stop reason: HOSPADM

## 2025-03-19 RX ORDER — ASPIRIN 81 MG/1
81 TABLET ORAL DAILY
Qty: 90 TABLET | Refills: 0 | Status: SHIPPED | OUTPATIENT
Start: 2025-03-19

## 2025-03-19 RX ORDER — ATORVASTATIN CALCIUM 40 MG/1
40 TABLET, FILM COATED ORAL DAILY
Qty: 90 TABLET | Refills: 0 | Status: SHIPPED | OUTPATIENT
Start: 2025-03-19

## 2025-03-19 RX ADMIN — INSULIN LISPRO 6 UNITS: 100 INJECTION, SOLUTION INTRAVENOUS; SUBCUTANEOUS at 16:27

## 2025-03-19 RX ADMIN — SODIUM CHLORIDE, PRESERVATIVE FREE 10 ML: 5 INJECTION INTRAVENOUS at 05:42

## 2025-03-19 RX ADMIN — INSULIN LISPRO 8 UNITS: 100 INJECTION, SOLUTION INTRAVENOUS; SUBCUTANEOUS at 13:27

## 2025-03-19 RX ADMIN — CLOPIDOGREL BISULFATE 75 MG: 75 TABLET, FILM COATED ORAL at 10:33

## 2025-03-19 RX ADMIN — ASPIRIN 81 MG: 81 TABLET, COATED ORAL at 10:33

## 2025-03-19 RX ADMIN — ENOXAPARIN SODIUM 40 MG: 100 INJECTION SUBCUTANEOUS at 10:33

## 2025-03-19 ASSESSMENT — PAIN SCALES - GENERAL: PAINLEVEL_OUTOF10: 0

## 2025-03-19 NOTE — PLAN OF CARE
Problem: Chronic Conditions and Co-morbidities  Goal: Patient's chronic conditions and co-morbidity symptoms are monitored and maintained or improved  Outcome: Progressing     Problem: Discharge Planning  Goal: Discharge to home or other facility with appropriate resources  Outcome: Progressing     Problem: Safety - Adult  Goal: Free from fall injury  Outcome: Progressing     Problem: Musculoskeletal - Adult  Goal: Return mobility to safest level of function  Outcome: Progressing

## 2025-03-19 NOTE — CARE COORDINATION
room updated. Patient denies any immediate needs or concerns. Left in a chair across from his bed, lowest locked position, call bell in reach, wife remains at bedside.     No further CM needs identified. CM will remain available should further needs arise.

## 2025-03-19 NOTE — PROGRESS NOTES
Advance Care Planning   Healthcare Decision Maker:    Primary Decision Maker: Elsa Renteria - Spouse - [phone]    Today we documented Decision Maker(s) consistent with Legal Next of Kin hierarchy.       Spiritual Health History and Assessment/Progress Note  Critical access hospital    Spiritual/Emotional Needs, Advance Care Planning,  ,  ,      Name: Senthil Renteria MRN: [id number]    Age: 59 y.o.     Sex: male   Language: English   Religious: Rastafari   Faintness     Date: 3/19/2025            Total Time Calculated: (P) 8 min              Spiritual Assessment began in [address] MEDICAL        Referral/Consult From: Multi-disciplinary team   Encounter Overview/Reason: Spiritual/Emotional Needs, Advance Care Planning  Service Provided For: Patient and family together    Zofia, Belief, Meaning:   Patient has beliefs or practices that help with coping during difficult times  Family/Friends are connected with a zofia tradition or spiritual practice and have beliefs or practices that help with coping during difficult times      Importance and Influence:  Patient has spiritual/personal beliefs that influence decisions regarding their health  Family/Friends have spiritual/personal beliefs that influence decisions regarding the patient's health    Community:  Patient is connected with a spiritual community and feels well-supported. Support system includes: Spouse/Partner and Extended family  Family/Friends are connected with a spiritual community:    Assessment and Plan of Care:     Patient Interventions include: Facilitated expression of thoughts and feelings and Affirmed coping skills/support systems  Family/Friends Interventions include: Facilitated expression of thoughts and feelings    Patient Plan of Care: No spiritual needs identified for follow-up  Family/Friends Plan of Care: No spiritual needs identified for follow-up    Electronically signed by SATHISH Coughlin on 3/19/2025 at 2:42 PM      
MRI screening form needs to be filled out and faxed to [phone] BEFORE MRI can be scheduled.  If unable to obtain information from patient , [employer] needs to be contacted . If patient is claustrophobic or will needs pain meds, please have ordered in advance in order to facilitate exam.   
-0.7  Weight Adjustment For: No Adjustment                 BMI Categories: Overweight (BMI 25.0-29.9)    Estimated Daily Nutrient Needs:  Energy Requirements Based On: Formula  Weight Used for Energy Requirements: Current  Energy (kcal/day): 9336-2637 kcal (MSJ x 1-1.2)  Weight Used for Protein Requirements: Current  Protein (g/day):  g (1-1.2 g/kg)  Method Used for Fluid Requirements: 1 ml/kcal  Fluid (ml/day): 4332-8345 mL or per MD    Nutrition Diagnosis:   Altered nutrition-related lab values related to endocrine dysfunction as evidenced by  ('s)    Nutrition Interventions:   Food and/or Nutrient Delivery: Continue Current Diet  Nutrition Education/Counseling: Survival skills/brief education completed  Coordination of Nutrition Care: Continue to monitor while inpatient  Plan of Care discussed with: patient    Goals:  Goals: Teach back diet education, by next RD assessment, Meet at least 75% of estimated needs  Type of Goal: New goal       Nutrition Monitoring and Evaluation:   Behavioral-Environmental Outcomes: Readiness for Change, Knowledge or Skill, Beliefs and Attitudes  Food/Nutrient Intake Outcomes: Food and Nutrient Intake  Physical Signs/Symptoms Outcomes: Biochemical Data, GI Status, Skin, Weight, Fluid Status or Edema    Discharge Planning:    Recommend pursue outpatient diabetes education     Hortensia Marshall RD  Contact: [phone]    
use of insulin (HCC)     HbA1c (9/3/2021) = 10.8    Vitamin D insufficiency 9/19/2021    Vitamin D 25-Hydroxy (9/19/2021) = 24.0     Past Surgical History:   Procedure Laterality Date    OTHER SURGICAL HISTORY Right 09/04/2021    S/P Incision and drainage of right foot (9/4/2021 - Dr. Louie Ibarra Jr.)    OTHER SURGICAL HISTORY      surgery on left eye    OTHER SURGICAL HISTORY Right 09/09/2021    S/P Incision and drainage, with decompression of multiple subfascial layers, right foot; Wound debridement, skin, soft tissue, and muscle, right foot (9/9/2021 - Dr. Ulises Kenny)    XR MIDLINE EQUAL OR GREATER THAN 5 YEARS  9/4/2021    XR MIDLINE EQUAL OR GREATER THAN 5 YEARS 9/4/2021       Home Situation:  Social/Functional History  Lives With: Spouse  Type of Home: House  Home Layout: One level  Home Access: Ramped entrance  Home Equipment: Cane, Walker - Rolling  Has the patient had two or more falls in the past year or any fall with injury in the past year?: No  Prior Level of Assist for ADLs: Independent  Prior Level of Assist for Homemaking: Independent  Prior Level of Assist for Ambulation: Independent community ambulator, with or without device  Prior Level of Assist for Transfers: Independent  Critical Behavior:  Orientation  Overall Orientation Status: Within Functional Limits  Orientation Level: Oriented X4  Cognition  Overall Cognitive Status: WFL    Strength:    Strength: Within functional limits    Tone & Sensation:   Tone: Normal  Sensation: Intact    Range Of Motion:  AROM: Within functional limits  PROM: Within functional limits    Functional Mobility:  Bed Mobility:     Bed Mobility Training  Bed Mobility Training: No  Transfers:     Transfer Training  Transfer Training: Yes  Sit to Stand: Independent  Stand to Sit: Independent  Balance:               Balance  Sitting: Intact  Standing: Impaired  Standing - Static: Good  Standing - Dynamic: Fair          Ambulation/Gait Training:                     
No    Transfers:  Transfer Training  Transfer Training: Yes  Sit to Stand: Independent  Stand to Sit: Independent    ADL Assessment:   Feeding: Independent  Grooming: Independent  UE Bathing: Independent  LE Bathing: Independent  UE Dressing: Independent  LE Dressing: Independent  Toileting: Independent    Pain:  Intensity Pre-treatment: 0/10   Intensity Post-treatment: 0/10    Activity Tolerance:   Activity Tolerance: Patient Tolerated treatment well  Please refer to the flowsheet for vital signs taken during this treatment.    After treatment:   [] Patient left in no apparent distress sitting up in chair  [x] Patient left in no apparent distress in bed  [x] Call bell left within reach  [x] Nursing notified  [x] Caregiver present  [] Bed/chair alarm activated  [x] No alarmIndependent, Alert and oriented x4, Family in room and agree to monitor the patient , and Nurse approved    COMMUNICATION/EDUCATION:   Patient Education  Education Given To: Patient;Family  Education Provided: Role of Therapy;Plan of Care;Fall Prevention Strategies  Education Method: Demonstration;Verbal;Teach Back  Barriers to Learning: None  Education Outcome: Verbalized understanding;Demonstrated understanding    Thank you for this referral.  Fly Romano OTR/L  Minutes: 8    Eval Complexity: Decision Making: Low Complexity

## 2025-04-04 PROBLEM — R73.9 HYPERGLYCEMIA: Status: ACTIVE | Noted: 2025-04-04

## 2025-06-05 ENCOUNTER — HOSPITAL ENCOUNTER (EMERGENCY)
Facility: HOSPITAL | Age: 60
Discharge: HOME | End: 2025-06-05
Payer: COMMERCIAL

## 2025-06-05 VITALS
BODY MASS INDEX: 26.51 KG/M2 | RESPIRATION RATE: 18 BRPM | HEART RATE: 97 BPM | DIASTOLIC BLOOD PRESSURE: 84 MMHG | SYSTOLIC BLOOD PRESSURE: 159 MMHG | WEIGHT: 190 LBS | OXYGEN SATURATION: 98 % | TEMPERATURE: 98.5 F

## 2025-06-05 DIAGNOSIS — R33.8 ACUTE URINARY RETENTION: Primary | ICD-10-CM

## 2025-06-05 LAB
APPEARANCE UR: CLEAR
BACTERIA URNS QL MICRO: NEGATIVE /HPF
BILIRUB UR QL: NEGATIVE
COLOR UR: YELLOW
EPITH CASTS URNS QL MICRO: NEGATIVE /LPF (ref 0–5)
GLUCOSE UR STRIP.AUTO-MCNC: NEGATIVE MG/DL
HGB UR QL STRIP: ABNORMAL
KETONES UR QL STRIP.AUTO: NEGATIVE MG/DL
LEUKOCYTE ESTERASE UR QL STRIP.AUTO: NEGATIVE
NITRITE UR QL STRIP.AUTO: NEGATIVE
PH UR STRIP: 6 (ref 5–8)
PROT UR STRIP-MCNC: 100 MG/DL
RBC #/AREA URNS HPF: NORMAL /HPF (ref 0–5)
SP GR UR REFRACTOMETRY: 1.01 (ref 1–1.03)
UROBILINOGEN UR QL STRIP.AUTO: 0.2 EU/DL (ref 0.2–1)
WBC URNS QL MICRO: NORMAL /HPF (ref 0–4)

## 2025-06-05 PROCEDURE — 81001 URINALYSIS AUTO W/SCOPE: CPT

## 2025-06-05 PROCEDURE — 99283 EMERGENCY DEPT VISIT LOW MDM: CPT

## 2025-06-05 PROCEDURE — 51702 INSERT TEMP BLADDER CATH: CPT

## 2025-06-05 PROCEDURE — 51798 US URINE CAPACITY MEASURE: CPT

## 2025-06-05 NOTE — ED TRIAGE NOTES
Pt arrived in the ED complaining of urinary retention and urology sent pt for a thurman catheter. Hx of hyperthyroidism,  DM type 1, and Vitamin D.

## (undated) DEVICE — WIPES FOLEY CARE SURESTEP PROVON DFC100

## (undated) DEVICE — WIPE PREMOIST CLEANSING WASHCLOTHS 7988

## (undated) DEVICE — PACK ENDOSCOPY GI CUSTOM UMMC

## (undated) DEVICE — SUCTION MANIFOLD DORNOCH ULTRA CART UL-CL500

## (undated) DEVICE — KIT CONNECTOR FOR OLYMPUS ENDOSCOPES DEFENDO 100310

## (undated) DEVICE — WIRE GUIDE 0.025"X450CM STR VISIGLIDE G-240-2545S

## (undated) DEVICE — SU DERMABOND PRINEO CLR602US

## (undated) DEVICE — PREP CHLORAPREP 26ML TINTED ORANGE  260815

## (undated) DEVICE — LINEN TOWEL PACK X5 5464

## (undated) DEVICE — SU PROLENE 1 CTX 30" 8455H

## (undated) DEVICE — CATH TRAY FOLEY SURESTEP 16FR W/URNE MTR STLK LATEX A303316A

## (undated) DEVICE — SU VICRYL 3-0 SH 27" J316H

## (undated) DEVICE — TUBING INSUFFLATION W/FILTER CPC TO LUER 620-030-301

## (undated) DEVICE — Device

## (undated) DEVICE — COVER ULTRASOUND PROBE W/GEL FLEXI-FEEL 6"X58" LF  25-FF658

## (undated) DEVICE — CATH RETRIEVAL BALLOON EXTRACTOR PRO RX-S INJ ABOVE 9-12MM

## (undated) DEVICE — KIT ENDO FIRST STEP DISINFECTANT 200ML W/POUCH EP-4

## (undated) DEVICE — DRAPE IOBAN INCISE 23X17" 6650EZ

## (undated) DEVICE — TAPE CLOTH 3" CARDINAL 3TRCL03

## (undated) DEVICE — ENDO CATH BALLOON EXTRACTOR PRO RX 09-12MM 4700

## (undated) DEVICE — ENDO FUSION QUATTRO EXTRACT BAL 12->20MMX200CM LATEX G31921

## (undated) DEVICE — DECANTER VIAL 2006S

## (undated) DEVICE — SU VICRYL 3-0 SH CR 8X18" J774

## (undated) DEVICE — SPONGE LAP 18X18" X8435

## (undated) DEVICE — PACK CENTRAL LINE INSERTION SAN32CLFCG

## (undated) DEVICE — ENDO CAP AND TUBING STERILE FOR ENDOGATOR  100130

## (undated) DEVICE — DRAPE SHEET MED 44X70" 9355

## (undated) DEVICE — SU PDS II 4-0 FS-2 27" Z422H

## (undated) DEVICE — ENDO FUSION OMNI-TOME G31903

## (undated) DEVICE — SU SILK 0 TIE 6X30" A306H

## (undated) DEVICE — SU MONOCRYL 4-0 P-3 18" UND Y494G

## (undated) DEVICE — PAD CHUX UNDERPAD 23X24" 7136

## (undated) DEVICE — WIRE GUIDE 0.025"X450CM ANG VISIGLIDE G-240-2545A

## (undated) DEVICE — PREP POVIDONE IODINE SOLUTION 10% 120ML

## (undated) DEVICE — TUBING SUCTION 10'X3/16" N510

## (undated) DEVICE — BNDG ABDOMINAL BINDER 9X45-62" 79-89071

## (undated) DEVICE — INFLATION DEVICE BIG 60 ENDO-AN6012

## (undated) DEVICE — KNIFE HANDLE W/15 BLADE 371615

## (undated) DEVICE — STOCKING SLEEVE VASOPRESS COMPRESSION CALF MED 18" VP501M

## (undated) DEVICE — DILATOR VASC W/INTRO GW 5FRX19CM .038" 405500

## (undated) DEVICE — BLADE CLIPPER SGL USE 9680

## (undated) DEVICE — ENDO TUBING CO2 SMARTCAP STERILE DISP 100145CO2EXT

## (undated) DEVICE — LINEN TOWEL PACK X6 WHITE 5487

## (undated) DEVICE — DRAPE LEGGINGS CLEAR 8430

## (undated) DEVICE — SOL WATER IRRIG 1000ML BOTTLE 2F7114

## (undated) DEVICE — GLOVE PROTEXIS POWDER FREE SMT 6.5  2D72PT65X

## (undated) DEVICE — SPECIMEN CONTAINER URINE 90ML STERILE 75.1435.002

## (undated) DEVICE — CATH PD MINICAP

## (undated) DEVICE — ESU GROUND PAD ADULT W/CORD E7507

## (undated) DEVICE — GOWN XLG DISP 9545

## (undated) DEVICE — GLOVE PROTEXIS BLUE W/NEU-THERA 8.0  2D73EB80

## (undated) DEVICE — LINEN TOWEL PACK X30 5481

## (undated) DEVICE — SU SILK 2-0 TIE 12X30" A305H

## (undated) DEVICE — SU DERMABOND ADVANCED .7ML DNX12

## (undated) DEVICE — PREP CHLORAPREP W/ORANGE TINT 10.5ML 260715

## (undated) DEVICE — KIT PATIENT POSITIONING PIGAZZI LATEX FREE 40580

## (undated) DEVICE — CATH BALLOON ELATION ESOPH/COLONIC/BIL 8-19-20MMX240CM ECB18

## (undated) DEVICE — SYSTEM CLEARIFY VISUALIZATION 21-345

## (undated) DEVICE — LINEN GOWN XLG 5407

## (undated) DEVICE — GLOVE PROTEXIS MICRO 7.5  2D73PM75

## (undated) DEVICE — HYDROGEN PEROXIDE 3% 16OZ D0012

## (undated) DEVICE — ENDO TROCAR FIRST ENTRY KII FIOS ADV FIX 05X100MM CFF03

## (undated) DEVICE — KIT NEW IMAGE COLOSTOMY/ILEOSTOMY 2 3/4" LF 19354

## (undated) RX ORDER — ALBUMIN, HUMAN INJ 5% 5 %
SOLUTION INTRAVENOUS
Status: DISPENSED
Start: 2017-12-06

## (undated) RX ORDER — LIDOCAINE HYDROCHLORIDE 10 MG/ML
INJECTION, SOLUTION EPIDURAL; INFILTRATION; INTRACAUDAL; PERINEURAL
Status: DISPENSED
Start: 2018-01-01

## (undated) RX ORDER — ONDANSETRON 2 MG/ML
INJECTION INTRAMUSCULAR; INTRAVENOUS
Status: DISPENSED
Start: 2017-12-06

## (undated) RX ORDER — PHENYLEPHRINE HCL IN 0.9% NACL 1 MG/10 ML
SYRINGE (ML) INTRAVENOUS
Status: DISPENSED
Start: 2018-01-01

## (undated) RX ORDER — FENTANYL CITRATE 50 UG/ML
INJECTION, SOLUTION INTRAMUSCULAR; INTRAVENOUS
Status: DISPENSED
Start: 2018-01-01

## (undated) RX ORDER — FENTANYL CITRATE 50 UG/ML
INJECTION, SOLUTION INTRAMUSCULAR; INTRAVENOUS
Status: DISPENSED
Start: 2017-12-06

## (undated) RX ORDER — PROPOFOL 10 MG/ML
INJECTION, EMULSION INTRAVENOUS
Status: DISPENSED
Start: 2017-08-10

## (undated) RX ORDER — PROPOFOL 10 MG/ML
INJECTION, EMULSION INTRAVENOUS
Status: DISPENSED
Start: 2018-01-01

## (undated) RX ORDER — LIDOCAINE HYDROCHLORIDE 20 MG/ML
INJECTION, SOLUTION EPIDURAL; INFILTRATION; INTRACAUDAL; PERINEURAL
Status: DISPENSED
Start: 2018-01-01

## (undated) RX ORDER — PHENYLEPHRINE HCL IN 0.9% NACL 1 MG/10 ML
SYRINGE (ML) INTRAVENOUS
Status: DISPENSED
Start: 2017-12-06

## (undated) RX ORDER — DEXAMETHASONE SODIUM PHOSPHATE 4 MG/ML
INJECTION, SOLUTION INTRA-ARTICULAR; INTRALESIONAL; INTRAMUSCULAR; INTRAVENOUS; SOFT TISSUE
Status: DISPENSED
Start: 2017-12-06

## (undated) RX ORDER — HYDROMORPHONE HYDROCHLORIDE 1 MG/ML
INJECTION, SOLUTION INTRAMUSCULAR; INTRAVENOUS; SUBCUTANEOUS
Status: DISPENSED
Start: 2017-12-06

## (undated) RX ORDER — SODIUM CHLORIDE 9 MG/ML
INJECTION, SOLUTION INTRAVENOUS
Status: DISPENSED
Start: 2018-01-01

## (undated) RX ORDER — FENTANYL CITRATE 50 UG/ML
INJECTION, SOLUTION INTRAMUSCULAR; INTRAVENOUS
Status: DISPENSED
Start: 2017-11-30

## (undated) RX ORDER — GRANISETRON HYDROCHLORIDE 1 MG/ML
INJECTION INTRAVENOUS
Status: DISPENSED
Start: 2017-12-06

## (undated) RX ORDER — SODIUM CHLORIDE, SODIUM LACTATE, POTASSIUM CHLORIDE, CALCIUM CHLORIDE 600; 310; 30; 20 MG/100ML; MG/100ML; MG/100ML; MG/100ML
INJECTION, SOLUTION INTRAVENOUS
Status: DISPENSED
Start: 2018-01-01

## (undated) RX ORDER — GLYCOPYRROLATE 0.2 MG/ML
INJECTION, SOLUTION INTRAMUSCULAR; INTRAVENOUS
Status: DISPENSED
Start: 2018-01-01

## (undated) RX ORDER — HEPARIN SODIUM (PORCINE) LOCK FLUSH IV SOLN 100 UNIT/ML 100 UNIT/ML
SOLUTION INTRAVENOUS
Status: DISPENSED
Start: 2017-11-30

## (undated) RX ORDER — ONDANSETRON 2 MG/ML
INJECTION INTRAMUSCULAR; INTRAVENOUS
Status: DISPENSED
Start: 2018-01-01

## (undated) RX ORDER — PROPOFOL 10 MG/ML
INJECTION, EMULSION INTRAVENOUS
Status: DISPENSED
Start: 2017-12-06

## (undated) RX ORDER — CELECOXIB 200 MG/1
CAPSULE ORAL
Status: DISPENSED
Start: 2017-12-06

## (undated) RX ORDER — LIDOCAINE HYDROCHLORIDE 10 MG/ML
INJECTION, SOLUTION INFILTRATION; PERINEURAL
Status: DISPENSED
Start: 2018-01-01

## (undated) RX ORDER — LIDOCAINE HYDROCHLORIDE 20 MG/ML
INJECTION, SOLUTION EPIDURAL; INFILTRATION; INTRACAUDAL; PERINEURAL
Status: DISPENSED
Start: 2017-12-06

## (undated) RX ORDER — CIPROFLOXACIN 2 MG/ML
INJECTION, SOLUTION INTRAVENOUS
Status: DISPENSED
Start: 2017-12-06

## (undated) RX ORDER — HEPARIN SODIUM (PORCINE) LOCK FLUSH IV SOLN 100 UNIT/ML 100 UNIT/ML
SOLUTION INTRAVENOUS
Status: DISPENSED
Start: 2018-01-01

## (undated) RX ORDER — ACETAMINOPHEN 325 MG/1
TABLET ORAL
Status: DISPENSED
Start: 2018-01-01

## (undated) RX ORDER — CEFAZOLIN SODIUM 2 G/100ML
INJECTION, SOLUTION INTRAVENOUS
Status: DISPENSED
Start: 2018-01-01

## (undated) RX ORDER — ROCURONIUM BROMIDE 50 MG/5 ML
SYRINGE (ML) INTRAVENOUS
Status: DISPENSED
Start: 2018-01-01